# Patient Record
Sex: MALE | Race: WHITE | NOT HISPANIC OR LATINO | Employment: OTHER | URBAN - METROPOLITAN AREA
[De-identification: names, ages, dates, MRNs, and addresses within clinical notes are randomized per-mention and may not be internally consistent; named-entity substitution may affect disease eponyms.]

---

## 2017-01-04 ENCOUNTER — ANESTHESIA (OUTPATIENT)
Dept: GASTROENTEROLOGY | Facility: AMBULARY SURGERY CENTER | Age: 57
End: 2017-01-04
Payer: MEDICARE

## 2017-01-04 ENCOUNTER — HOSPITAL ENCOUNTER (OUTPATIENT)
Facility: AMBULARY SURGERY CENTER | Age: 57
Setting detail: OUTPATIENT SURGERY
Discharge: HOME | End: 2017-01-04
Attending: INTERNAL MEDICINE | Admitting: INTERNAL MEDICINE
Payer: MEDICARE

## 2017-01-04 VITALS
HEART RATE: 61 BPM | HEIGHT: 71 IN | WEIGHT: 260 LBS | SYSTOLIC BLOOD PRESSURE: 132 MMHG | OXYGEN SATURATION: 96 % | BODY MASS INDEX: 36.4 KG/M2 | RESPIRATION RATE: 16 BRPM | TEMPERATURE: 98.3 F | DIASTOLIC BLOOD PRESSURE: 82 MMHG

## 2017-01-04 DIAGNOSIS — R19.4 CHANGE IN BOWEL HABITS: ICD-10-CM

## 2017-01-04 DIAGNOSIS — R10.12 LEFT UPPER QUADRANT PAIN: ICD-10-CM

## 2017-01-04 LAB — GLUCOSE SERPL-MCNC: 219 MG/DL (ref 65–140)

## 2017-01-04 PROCEDURE — 88305 TISSUE EXAM BY PATHOLOGIST: CPT | Performed by: INTERNAL MEDICINE

## 2017-01-04 PROCEDURE — 88312 SPECIAL STAINS GROUP 1: CPT | Performed by: INTERNAL MEDICINE

## 2017-01-04 PROCEDURE — 82948 REAGENT STRIP/BLOOD GLUCOSE: CPT

## 2017-01-04 RX ORDER — SODIUM CHLORIDE, SODIUM LACTATE, POTASSIUM CHLORIDE, CALCIUM CHLORIDE 600; 310; 30; 20 MG/100ML; MG/100ML; MG/100ML; MG/100ML
125 INJECTION, SOLUTION INTRAVENOUS CONTINUOUS
Status: DISCONTINUED | OUTPATIENT
Start: 2017-01-04 | End: 2017-01-04 | Stop reason: HOSPADM

## 2017-01-04 RX ORDER — PROPOFOL 10 MG/ML
INJECTION, EMULSION INTRAVENOUS AS NEEDED
Status: DISCONTINUED | OUTPATIENT
Start: 2017-01-04 | End: 2017-01-04 | Stop reason: SURG

## 2017-01-04 RX ADMIN — PROPOFOL 25 MG: 10 INJECTION, EMULSION INTRAVENOUS at 12:05

## 2017-01-04 RX ADMIN — SODIUM CHLORIDE, SODIUM LACTATE, POTASSIUM CHLORIDE, AND CALCIUM CHLORIDE: .6; .31; .03; .02 INJECTION, SOLUTION INTRAVENOUS at 11:29

## 2017-01-04 RX ADMIN — PROPOFOL 25 MG: 10 INJECTION, EMULSION INTRAVENOUS at 11:58

## 2017-01-04 RX ADMIN — SODIUM CHLORIDE, SODIUM LACTATE, POTASSIUM CHLORIDE, AND CALCIUM CHLORIDE 125 ML/HR: .6; .31; .03; .02 INJECTION, SOLUTION INTRAVENOUS at 11:21

## 2017-01-04 RX ADMIN — PROPOFOL 25 MG: 10 INJECTION, EMULSION INTRAVENOUS at 11:46

## 2017-01-04 RX ADMIN — PROPOFOL 50 MG: 10 INJECTION, EMULSION INTRAVENOUS at 11:32

## 2017-01-04 RX ADMIN — PROPOFOL 25 MG: 10 INJECTION, EMULSION INTRAVENOUS at 11:52

## 2017-01-04 RX ADMIN — PROPOFOL 50 MG: 10 INJECTION, EMULSION INTRAVENOUS at 11:29

## 2017-01-04 RX ADMIN — PROPOFOL 25 MG: 10 INJECTION, EMULSION INTRAVENOUS at 11:37

## 2017-01-04 RX ADMIN — PROPOFOL 25 MG: 10 INJECTION, EMULSION INTRAVENOUS at 12:09

## 2017-01-05 ENCOUNTER — GENERIC CONVERSION - ENCOUNTER (OUTPATIENT)
Dept: OTHER | Facility: OTHER | Age: 57
End: 2017-01-05

## 2017-01-06 ENCOUNTER — GENERIC CONVERSION - ENCOUNTER (OUTPATIENT)
Dept: OTHER | Facility: OTHER | Age: 57
End: 2017-01-06

## 2017-01-06 ENCOUNTER — ALLSCRIPTS OFFICE VISIT (OUTPATIENT)
Dept: OTHER | Facility: OTHER | Age: 57
End: 2017-01-06

## 2017-01-13 ENCOUNTER — GENERIC CONVERSION - ENCOUNTER (OUTPATIENT)
Dept: OTHER | Facility: OTHER | Age: 57
End: 2017-01-13

## 2017-01-17 ENCOUNTER — TRANSCRIBE ORDERS (OUTPATIENT)
Dept: ADMINISTRATIVE | Facility: HOSPITAL | Age: 57
End: 2017-01-17

## 2017-01-17 ENCOUNTER — GENERIC CONVERSION - ENCOUNTER (OUTPATIENT)
Dept: OTHER | Facility: OTHER | Age: 57
End: 2017-01-17

## 2017-01-17 DIAGNOSIS — R10.10 PAIN OF UPPER ABDOMEN: Primary | ICD-10-CM

## 2017-01-23 ENCOUNTER — ALLSCRIPTS OFFICE VISIT (OUTPATIENT)
Dept: OTHER | Facility: OTHER | Age: 57
End: 2017-01-23

## 2017-01-30 ENCOUNTER — TRANSCRIBE ORDERS (OUTPATIENT)
Dept: ADMINISTRATIVE | Facility: HOSPITAL | Age: 57
End: 2017-01-30

## 2017-01-30 ENCOUNTER — GENERIC CONVERSION - ENCOUNTER (OUTPATIENT)
Dept: OTHER | Facility: OTHER | Age: 57
End: 2017-01-30

## 2017-01-30 DIAGNOSIS — K57.30 DIVERTICULOSIS OF LARGE INTESTINE WITHOUT DIVERTICULITIS: Primary | ICD-10-CM

## 2017-02-01 ENCOUNTER — GENERIC CONVERSION - ENCOUNTER (OUTPATIENT)
Dept: OTHER | Facility: OTHER | Age: 57
End: 2017-02-01

## 2017-02-03 ENCOUNTER — HOSPITAL ENCOUNTER (OUTPATIENT)
Dept: RADIOLOGY | Facility: HOSPITAL | Age: 57
Discharge: HOME | End: 2017-02-03
Attending: INTERNAL MEDICINE
Payer: MEDICARE

## 2017-02-03 DIAGNOSIS — K57.30 DIVERTICULOSIS OF LARGE INTESTINE WITHOUT DIVERTICULITIS: ICD-10-CM

## 2017-02-03 PROCEDURE — 74177 CT ABD & PELVIS W/CONTRAST: CPT

## 2017-02-03 RX ADMIN — IOHEXOL 100 ML: 350 INJECTION, SOLUTION INTRAVENOUS at 11:53

## 2017-02-20 ENCOUNTER — HOSPITAL ENCOUNTER (OUTPATIENT)
Dept: RADIOLOGY | Facility: HOSPITAL | Age: 57
Discharge: HOME | End: 2017-02-20
Payer: MEDICARE

## 2017-02-20 DIAGNOSIS — R10.10 PAIN OF UPPER ABDOMEN: ICD-10-CM

## 2017-02-20 PROCEDURE — A9541 TC99M SULFUR COLLOID: HCPCS

## 2017-02-20 PROCEDURE — 78264 GASTRIC EMPTYING IMG STUDY: CPT

## 2017-02-25 ENCOUNTER — GENERIC CONVERSION - ENCOUNTER (OUTPATIENT)
Dept: OTHER | Facility: OTHER | Age: 57
End: 2017-02-25

## 2017-03-13 RX ORDER — OMEPRAZOLE 20 MG/1
20 CAPSULE, DELAYED RELEASE ORAL EVERY EVENING
COMMUNITY
End: 2018-08-29 | Stop reason: SDUPTHER

## 2017-03-15 ENCOUNTER — ANESTHESIA (OUTPATIENT)
Dept: GASTROENTEROLOGY | Facility: AMBULARY SURGERY CENTER | Age: 57
End: 2017-03-15
Payer: MEDICARE

## 2017-03-15 ENCOUNTER — HOSPITAL ENCOUNTER (OUTPATIENT)
Facility: AMBULARY SURGERY CENTER | Age: 57
Setting detail: OUTPATIENT SURGERY
Discharge: HOME | End: 2017-03-15
Attending: INTERNAL MEDICINE | Admitting: INTERNAL MEDICINE
Payer: MEDICARE

## 2017-03-15 VITALS
SYSTOLIC BLOOD PRESSURE: 129 MMHG | RESPIRATION RATE: 18 BRPM | OXYGEN SATURATION: 94 % | DIASTOLIC BLOOD PRESSURE: 68 MMHG | HEART RATE: 66 BPM

## 2017-03-15 DIAGNOSIS — K31.84 GASTROPARESIS: ICD-10-CM

## 2017-03-15 DIAGNOSIS — K21.9 GASTRO-ESOPHAGEAL REFLUX DISEASE WITHOUT ESOPHAGITIS: ICD-10-CM

## 2017-03-15 LAB — GLUCOSE SERPL-MCNC: 222 MG/DL (ref 65–140)

## 2017-03-15 PROCEDURE — 88305 TISSUE EXAM BY PATHOLOGIST: CPT | Performed by: INTERNAL MEDICINE

## 2017-03-15 PROCEDURE — 82948 REAGENT STRIP/BLOOD GLUCOSE: CPT

## 2017-03-15 RX ORDER — PROPOFOL 10 MG/ML
INJECTION, EMULSION INTRAVENOUS AS NEEDED
Status: DISCONTINUED | OUTPATIENT
Start: 2017-03-15 | End: 2017-03-15 | Stop reason: SURG

## 2017-03-15 RX ORDER — SODIUM CHLORIDE, SODIUM LACTATE, POTASSIUM CHLORIDE, CALCIUM CHLORIDE 600; 310; 30; 20 MG/100ML; MG/100ML; MG/100ML; MG/100ML
INJECTION, SOLUTION INTRAVENOUS CONTINUOUS PRN
Status: DISCONTINUED | OUTPATIENT
Start: 2017-03-15 | End: 2017-03-15 | Stop reason: SURG

## 2017-03-15 RX ADMIN — SODIUM CHLORIDE, SODIUM LACTATE, POTASSIUM CHLORIDE, AND CALCIUM CHLORIDE: .6; .31; .03; .02 INJECTION, SOLUTION INTRAVENOUS at 09:01

## 2017-03-15 RX ADMIN — PROPOFOL 200 MG: 10 INJECTION, EMULSION INTRAVENOUS at 09:08

## 2017-03-19 ENCOUNTER — HOSPITAL ENCOUNTER (INPATIENT)
Facility: HOSPITAL | Age: 57
LOS: 6 days | Discharge: HOME HEALTH | DRG: 191 | End: 2017-03-26
Attending: EMERGENCY MEDICINE | Admitting: INTERNAL MEDICINE
Payer: MEDICARE

## 2017-03-19 DIAGNOSIS — J18.9 PNEUMONIA: ICD-10-CM

## 2017-03-19 DIAGNOSIS — B96.89 ACUTE BACTERIAL BRONCHITIS: ICD-10-CM

## 2017-03-19 DIAGNOSIS — F31.9 BIPOLAR AFFECTIVE (HCC): ICD-10-CM

## 2017-03-19 DIAGNOSIS — R07.89 LEFT-SIDED CHEST WALL PAIN: ICD-10-CM

## 2017-03-19 DIAGNOSIS — J20.9 ACUTE BRONCHITIS: ICD-10-CM

## 2017-03-19 DIAGNOSIS — I10 ESSENTIAL HYPERTENSION: Chronic | ICD-10-CM

## 2017-03-19 DIAGNOSIS — E11.9 DIABETES MELLITUS (HCC): Chronic | ICD-10-CM

## 2017-03-19 DIAGNOSIS — E11.65 HYPERGLYCEMIA DUE TO TYPE 2 DIABETES MELLITUS (HCC): ICD-10-CM

## 2017-03-19 DIAGNOSIS — N17.9 ACUTE KIDNEY INJURY (HCC): ICD-10-CM

## 2017-03-19 DIAGNOSIS — R53.83 FATIGUE, UNSPECIFIED TYPE: ICD-10-CM

## 2017-03-19 DIAGNOSIS — J20.8 ACUTE BACTERIAL BRONCHITIS: ICD-10-CM

## 2017-03-19 DIAGNOSIS — J44.9 COPD (CHRONIC OBSTRUCTIVE PULMONARY DISEASE) (HCC): ICD-10-CM

## 2017-03-19 DIAGNOSIS — R10.12 LEFT UPPER QUADRANT PAIN: ICD-10-CM

## 2017-03-19 DIAGNOSIS — G47.33 OSA ON CPAP: ICD-10-CM

## 2017-03-19 DIAGNOSIS — A41.9 SEPSIS (HCC): Primary | ICD-10-CM

## 2017-03-19 DIAGNOSIS — N17.9 AKI (ACUTE KIDNEY INJURY) (HCC): ICD-10-CM

## 2017-03-19 DIAGNOSIS — F99 PSYCHIATRIC DISORDER: ICD-10-CM

## 2017-03-19 DIAGNOSIS — E66.9 OBESITY: ICD-10-CM

## 2017-03-19 DIAGNOSIS — J20.5 ACUTE BRONCHITIS DUE TO RESPIRATORY SYNCYTIAL VIRUS (RSV): ICD-10-CM

## 2017-03-19 DIAGNOSIS — J44.1 COPD EXACERBATION (HCC): ICD-10-CM

## 2017-03-19 DIAGNOSIS — R53.82 CHRONIC FATIGUE SYNDROME: Chronic | ICD-10-CM

## 2017-03-19 DIAGNOSIS — R05.9 COUGH: ICD-10-CM

## 2017-03-19 DIAGNOSIS — Z99.89 OSA ON CPAP: ICD-10-CM

## 2017-03-20 ENCOUNTER — APPOINTMENT (EMERGENCY)
Dept: RADIOLOGY | Facility: HOSPITAL | Age: 57
DRG: 191 | End: 2017-03-20
Payer: MEDICARE

## 2017-03-20 PROBLEM — E11.65 HYPERGLYCEMIA DUE TO TYPE 2 DIABETES MELLITUS (HCC): Status: ACTIVE | Noted: 2017-03-20

## 2017-03-20 PROBLEM — J20.9 ACUTE BRONCHITIS: Status: ACTIVE | Noted: 2017-03-20

## 2017-03-20 PROBLEM — R65.20 SEVERE SEPSIS (HCC): Status: ACTIVE | Noted: 2017-03-20

## 2017-03-20 PROBLEM — A41.9 SEPSIS (HCC): Status: ACTIVE | Noted: 2017-03-20

## 2017-03-20 PROBLEM — N17.9 ACUTE KIDNEY INJURY (HCC): Status: ACTIVE | Noted: 2017-03-20

## 2017-03-20 PROBLEM — R07.89 LEFT-SIDED CHEST WALL PAIN: Status: ACTIVE | Noted: 2017-03-20

## 2017-03-20 PROBLEM — J44.1 COPD EXACERBATION (HCC): Status: ACTIVE | Noted: 2017-03-20

## 2017-03-20 LAB
ANION GAP SERPL CALCULATED.3IONS-SCNC: 11 MMOL/L (ref 4–13)
ANION GAP SERPL CALCULATED.3IONS-SCNC: 12 MMOL/L (ref 4–13)
APTT PPP: 23 SECONDS (ref 24–33)
BACTERIA UR QL AUTO: ABNORMAL /HPF
BASOPHILS # BLD AUTO: 0 THOUSANDS/ΜL (ref 0–0.1)
BASOPHILS NFR BLD AUTO: 0 % (ref 0–1)
BILIRUB UR QL STRIP: NEGATIVE
BUN SERPL-MCNC: 26 MG/DL (ref 5–25)
BUN SERPL-MCNC: 27 MG/DL (ref 5–25)
CALCIUM SERPL-MCNC: 8.9 MG/DL (ref 8.3–10.1)
CALCIUM SERPL-MCNC: 9 MG/DL (ref 8.3–10.1)
CHLORIDE SERPL-SCNC: 100 MMOL/L (ref 100–108)
CHLORIDE SERPL-SCNC: 103 MMOL/L (ref 100–108)
CLARITY UR: CLEAR
CO2 SERPL-SCNC: 24 MMOL/L (ref 21–32)
CO2 SERPL-SCNC: 24 MMOL/L (ref 21–32)
COLOR UR: YELLOW
CREAT SERPL-MCNC: 1.37 MG/DL (ref 0.6–1.3)
CREAT SERPL-MCNC: 1.38 MG/DL (ref 0.6–1.3)
CRP SERPL QL: <3 MG/L
DEPRECATED D DIMER PPP: 320 NG/ML (FEU) (ref 190–520)
EOSINOPHIL # BLD AUTO: 0.1 THOUSAND/ΜL (ref 0–0.61)
EOSINOPHIL NFR BLD AUTO: 1 % (ref 0–6)
ERYTHROCYTE [DISTWIDTH] IN BLOOD BY AUTOMATED COUNT: 13.3 % (ref 11.6–15.1)
EST. AVERAGE GLUCOSE BLD GHB EST-MCNC: 192 MG/DL
GFR SERPL CREATININE-BSD FRML MDRD: 53.1 ML/MIN/1.73SQ M
GFR SERPL CREATININE-BSD FRML MDRD: 53.6 ML/MIN/1.73SQ M
GLUCOSE SERPL-MCNC: 173 MG/DL (ref 65–140)
GLUCOSE SERPL-MCNC: 261 MG/DL (ref 65–140)
GLUCOSE SERPL-MCNC: 336 MG/DL (ref 65–140)
GLUCOSE SERPL-MCNC: 357 MG/DL (ref 65–140)
GLUCOSE SERPL-MCNC: 369 MG/DL (ref 65–140)
GLUCOSE SERPL-MCNC: 432 MG/DL (ref 65–140)
GLUCOSE SERPL-MCNC: 437 MG/DL (ref 65–140)
GLUCOSE UR STRIP-MCNC: ABNORMAL MG/DL
HBA1C MFR BLD: 8.3 % (ref 4.2–6.3)
HCT VFR BLD AUTO: 41.7 % (ref 42–52)
HGB BLD-MCNC: 14 G/DL (ref 14–18)
HGB UR QL STRIP.AUTO: NEGATIVE
INR PPP: 0.95 (ref 0.86–1.16)
KETONES UR STRIP-MCNC: NEGATIVE MG/DL
L PNEUMO1 AG UR QL IA.RAPID: NEGATIVE
LACTATE SERPL-SCNC: 2.7 MMOL/L (ref 0.5–2)
LACTATE SERPL-SCNC: 2.9 MMOL/L (ref 0.5–2)
LACTATE SERPL-SCNC: 3.1 MMOL/L (ref 0.5–2)
LACTATE SERPL-SCNC: 3.4 MMOL/L (ref 0.5–2)
LACTATE SERPL-SCNC: 3.5 MMOL/L (ref 0.5–2)
LACTATE SERPL-SCNC: 3.6 MMOL/L (ref 0.5–2)
LACTATE SERPL-SCNC: 3.9 MMOL/L (ref 0.5–2)
LACTATE SERPL-SCNC: 4.4 MMOL/L (ref 0.5–2)
LEUKOCYTE ESTERASE UR QL STRIP: ABNORMAL
LYMPHOCYTES # BLD AUTO: 2.8 THOUSANDS/ΜL (ref 0.6–4.47)
LYMPHOCYTES NFR BLD AUTO: 32 % (ref 14–44)
MAGNESIUM SERPL-MCNC: 1.7 MG/DL (ref 1.6–2.6)
MAGNESIUM SERPL-MCNC: 1.7 MG/DL (ref 1.6–2.6)
MCH RBC QN AUTO: 31 PG (ref 27–31)
MCHC RBC AUTO-ENTMCNC: 33.6 G/DL (ref 31.4–37.4)
MCV RBC AUTO: 92 FL (ref 82–98)
MONOCYTES # BLD AUTO: 0.7 THOUSAND/ΜL (ref 0.17–1.22)
MONOCYTES NFR BLD AUTO: 8 % (ref 4–12)
NEUTROPHILS # BLD AUTO: 5.1 THOUSANDS/ΜL (ref 1.85–7.62)
NEUTS SEG NFR BLD AUTO: 59 % (ref 43–75)
NITRITE UR QL STRIP: NEGATIVE
NON-SQ EPI CELLS URNS QL MICRO: ABNORMAL /HPF
NRBC BLD AUTO-RTO: 0 /100 WBCS
NT-PROBNP SERPL-MCNC: 56 PG/ML
PH UR STRIP.AUTO: 5 [PH] (ref 5–9)
PLATELET # BLD AUTO: 211 THOUSANDS/UL (ref 130–400)
PMV BLD AUTO: 8 FL (ref 8.9–12.7)
POTASSIUM SERPL-SCNC: 5 MMOL/L (ref 3.5–5.3)
POTASSIUM SERPL-SCNC: 5.1 MMOL/L (ref 3.5–5.3)
PROT UR STRIP-MCNC: NEGATIVE MG/DL
PROTHROMBIN TIME: 10 SECONDS (ref 9.4–11.7)
RBC # BLD AUTO: 4.53 MILLION/UL (ref 4.7–6.1)
RBC #/AREA URNS AUTO: ABNORMAL /HPF
S PNEUM AG UR QL: NEGATIVE
SODIUM SERPL-SCNC: 136 MMOL/L (ref 136–145)
SODIUM SERPL-SCNC: 138 MMOL/L (ref 136–145)
SP GR UR STRIP.AUTO: 1.01 (ref 1–1.03)
TROPONIN I SERPL-MCNC: <0.02 NG/ML
TROPONIN I SERPL-MCNC: <0.02 NG/ML
UROBILINOGEN UR QL STRIP.AUTO: 0.2 E.U./DL
WBC # BLD AUTO: 8.7 THOUSAND/UL (ref 4.8–10.8)
WBC #/AREA URNS AUTO: ABNORMAL /HPF

## 2017-03-20 PROCEDURE — 80048 BASIC METABOLIC PNL TOTAL CA: CPT | Performed by: EMERGENCY MEDICINE

## 2017-03-20 PROCEDURE — 87147 CULTURE TYPE IMMUNOLOGIC: CPT | Performed by: NURSE PRACTITIONER

## 2017-03-20 PROCEDURE — 94664 DEMO&/EVAL PT USE INHALER: CPT

## 2017-03-20 PROCEDURE — 85730 THROMBOPLASTIN TIME PARTIAL: CPT | Performed by: NURSE PRACTITIONER

## 2017-03-20 PROCEDURE — 94640 AIRWAY INHALATION TREATMENT: CPT

## 2017-03-20 PROCEDURE — 83605 ASSAY OF LACTIC ACID: CPT | Performed by: NURSE PRACTITIONER

## 2017-03-20 PROCEDURE — 87798 DETECT AGENT NOS DNA AMP: CPT | Performed by: NURSE PRACTITIONER

## 2017-03-20 PROCEDURE — 87205 SMEAR GRAM STAIN: CPT | Performed by: NURSE PRACTITIONER

## 2017-03-20 PROCEDURE — 83605 ASSAY OF LACTIC ACID: CPT | Performed by: INTERNAL MEDICINE

## 2017-03-20 PROCEDURE — 96361 HYDRATE IV INFUSION ADD-ON: CPT

## 2017-03-20 PROCEDURE — 86140 C-REACTIVE PROTEIN: CPT | Performed by: NURSE PRACTITIONER

## 2017-03-20 PROCEDURE — 94760 N-INVAS EAR/PLS OXIMETRY 1: CPT

## 2017-03-20 PROCEDURE — 87081 CULTURE SCREEN ONLY: CPT | Performed by: NURSE PRACTITIONER

## 2017-03-20 PROCEDURE — 83735 ASSAY OF MAGNESIUM: CPT | Performed by: NURSE PRACTITIONER

## 2017-03-20 PROCEDURE — 84484 ASSAY OF TROPONIN QUANT: CPT | Performed by: NURSE PRACTITIONER

## 2017-03-20 PROCEDURE — 94660 CPAP INITIATION&MGMT: CPT

## 2017-03-20 PROCEDURE — 87186 SC STD MICRODIL/AGAR DIL: CPT | Performed by: NURSE PRACTITIONER

## 2017-03-20 PROCEDURE — 99285 EMERGENCY DEPT VISIT HI MDM: CPT

## 2017-03-20 PROCEDURE — 85379 FIBRIN DEGRADATION QUANT: CPT | Performed by: EMERGENCY MEDICINE

## 2017-03-20 PROCEDURE — 84484 ASSAY OF TROPONIN QUANT: CPT | Performed by: EMERGENCY MEDICINE

## 2017-03-20 PROCEDURE — 83735 ASSAY OF MAGNESIUM: CPT | Performed by: EMERGENCY MEDICINE

## 2017-03-20 PROCEDURE — 83036 HEMOGLOBIN GLYCOSYLATED A1C: CPT | Performed by: NURSE PRACTITIONER

## 2017-03-20 PROCEDURE — 87070 CULTURE OTHR SPECIMN AEROBIC: CPT | Performed by: NURSE PRACTITIONER

## 2017-03-20 PROCEDURE — 87449 NOS EACH ORGANISM AG IA: CPT | Performed by: NURSE PRACTITIONER

## 2017-03-20 PROCEDURE — 85610 PROTHROMBIN TIME: CPT | Performed by: NURSE PRACTITIONER

## 2017-03-20 PROCEDURE — 81001 URINALYSIS AUTO W/SCOPE: CPT | Performed by: EMERGENCY MEDICINE

## 2017-03-20 PROCEDURE — 36415 COLL VENOUS BLD VENIPUNCTURE: CPT | Performed by: EMERGENCY MEDICINE

## 2017-03-20 PROCEDURE — 71020 HB CHEST X-RAY 2VW FRONTAL&LATL: CPT

## 2017-03-20 PROCEDURE — 93005 ELECTROCARDIOGRAM TRACING: CPT | Performed by: EMERGENCY MEDICINE

## 2017-03-20 PROCEDURE — 85025 COMPLETE CBC W/AUTO DIFF WBC: CPT | Performed by: EMERGENCY MEDICINE

## 2017-03-20 PROCEDURE — 87040 BLOOD CULTURE FOR BACTERIA: CPT | Performed by: EMERGENCY MEDICINE

## 2017-03-20 PROCEDURE — 96375 TX/PRO/DX INJ NEW DRUG ADDON: CPT

## 2017-03-20 PROCEDURE — 83880 ASSAY OF NATRIURETIC PEPTIDE: CPT | Performed by: EMERGENCY MEDICINE

## 2017-03-20 PROCEDURE — 83605 ASSAY OF LACTIC ACID: CPT | Performed by: EMERGENCY MEDICINE

## 2017-03-20 PROCEDURE — 96374 THER/PROPH/DIAG INJ IV PUSH: CPT

## 2017-03-20 PROCEDURE — 87086 URINE CULTURE/COLONY COUNT: CPT | Performed by: EMERGENCY MEDICINE

## 2017-03-20 PROCEDURE — 94644 CONT INHLJ TX 1ST HOUR: CPT

## 2017-03-20 PROCEDURE — 80048 BASIC METABOLIC PNL TOTAL CA: CPT | Performed by: NURSE PRACTITIONER

## 2017-03-20 PROCEDURE — 82948 REAGENT STRIP/BLOOD GLUCOSE: CPT

## 2017-03-20 RX ORDER — LAMOTRIGINE 100 MG/1
100 TABLET ORAL
Status: DISCONTINUED | OUTPATIENT
Start: 2017-03-20 | End: 2017-03-26 | Stop reason: HOSPADM

## 2017-03-20 RX ORDER — MORPHINE SULFATE 4 MG/ML
4 INJECTION, SOLUTION INTRAMUSCULAR; INTRAVENOUS ONCE
Status: COMPLETED | OUTPATIENT
Start: 2017-03-20 | End: 2017-03-20

## 2017-03-20 RX ORDER — PREGABALIN 50 MG/1
50 CAPSULE ORAL 3 TIMES DAILY
Status: DISCONTINUED | OUTPATIENT
Start: 2017-03-20 | End: 2017-03-26 | Stop reason: HOSPADM

## 2017-03-20 RX ORDER — ALBUTEROL SULFATE 2.5 MG/3ML
10 SOLUTION RESPIRATORY (INHALATION) ONCE
Status: COMPLETED | OUTPATIENT
Start: 2017-03-20 | End: 2017-03-20

## 2017-03-20 RX ORDER — IPRATROPIUM BROMIDE AND ALBUTEROL SULFATE 2.5; .5 MG/3ML; MG/3ML
3 SOLUTION RESPIRATORY (INHALATION) ONCE
Status: COMPLETED | OUTPATIENT
Start: 2017-03-20 | End: 2017-03-20

## 2017-03-20 RX ORDER — OXYCODONE HYDROCHLORIDE AND ACETAMINOPHEN 5; 325 MG/1; MG/1
1 TABLET ORAL EVERY 6 HOURS PRN
Status: DISCONTINUED | OUTPATIENT
Start: 2017-03-20 | End: 2017-03-26 | Stop reason: HOSPADM

## 2017-03-20 RX ORDER — FENOFIBRATE 48 MG/1
160 TABLET, COATED ORAL DAILY
Status: DISCONTINUED | OUTPATIENT
Start: 2017-03-20 | End: 2017-03-20 | Stop reason: CLARIF

## 2017-03-20 RX ORDER — SODIUM CHLORIDE FOR INHALATION 0.9 %
3 VIAL, NEBULIZER (ML) INHALATION EVERY 4 HOURS PRN
Status: DISCONTINUED | OUTPATIENT
Start: 2017-03-20 | End: 2017-03-26 | Stop reason: HOSPADM

## 2017-03-20 RX ORDER — SODIUM CHLORIDE 9 MG/ML
100 INJECTION, SOLUTION INTRAVENOUS CONTINUOUS
Status: DISCONTINUED | OUTPATIENT
Start: 2017-03-20 | End: 2017-03-22

## 2017-03-20 RX ORDER — SODIUM CHLORIDE 9 MG/ML
100 INJECTION, SOLUTION INTRAVENOUS CONTINUOUS
Status: DISCONTINUED | OUTPATIENT
Start: 2017-03-20 | End: 2017-03-20

## 2017-03-20 RX ORDER — ZOLPIDEM TARTRATE 5 MG/1
10 TABLET ORAL
Status: DISCONTINUED | OUTPATIENT
Start: 2017-03-20 | End: 2017-03-26 | Stop reason: HOSPADM

## 2017-03-20 RX ORDER — AMLODIPINE BESYLATE 5 MG/1
5 TABLET ORAL 2 TIMES DAILY
Status: DISCONTINUED | OUTPATIENT
Start: 2017-03-20 | End: 2017-03-26 | Stop reason: HOSPADM

## 2017-03-20 RX ORDER — HYDROXYZINE PAMOATE 50 MG/1
50 CAPSULE ORAL
Status: DISCONTINUED | OUTPATIENT
Start: 2017-03-20 | End: 2017-03-20

## 2017-03-20 RX ORDER — LISINOPRIL 20 MG/1
20 TABLET ORAL DAILY
Status: DISCONTINUED | OUTPATIENT
Start: 2017-03-20 | End: 2017-03-20

## 2017-03-20 RX ORDER — IPRATROPIUM BROMIDE AND ALBUTEROL SULFATE 2.5; .5 MG/3ML; MG/3ML
3 SOLUTION RESPIRATORY (INHALATION)
Status: DISCONTINUED | OUTPATIENT
Start: 2017-03-20 | End: 2017-03-26 | Stop reason: HOSPADM

## 2017-03-20 RX ORDER — BENZONATATE 100 MG/1
100 CAPSULE ORAL 3 TIMES DAILY
Status: DISCONTINUED | OUTPATIENT
Start: 2017-03-20 | End: 2017-03-26 | Stop reason: HOSPADM

## 2017-03-20 RX ORDER — FENOFIBRATE 145 MG/1
145 TABLET, COATED ORAL DAILY
Status: DISCONTINUED | OUTPATIENT
Start: 2017-03-20 | End: 2017-03-26 | Stop reason: HOSPADM

## 2017-03-20 RX ORDER — METHYLPREDNISOLONE SODIUM SUCCINATE 125 MG/2ML
60 INJECTION, POWDER, LYOPHILIZED, FOR SOLUTION INTRAMUSCULAR; INTRAVENOUS ONCE
Status: COMPLETED | OUTPATIENT
Start: 2017-03-20 | End: 2017-03-20

## 2017-03-20 RX ORDER — PANTOPRAZOLE SODIUM 40 MG/1
40 TABLET, DELAYED RELEASE ORAL
Status: DISCONTINUED | OUTPATIENT
Start: 2017-03-20 | End: 2017-03-26 | Stop reason: HOSPADM

## 2017-03-20 RX ORDER — ACETAMINOPHEN 325 MG/1
650 TABLET ORAL EVERY 6 HOURS PRN
Status: DISCONTINUED | OUTPATIENT
Start: 2017-03-20 | End: 2017-03-26 | Stop reason: HOSPADM

## 2017-03-20 RX ORDER — LEVALBUTEROL 1.25 MG/.5ML
1.25 SOLUTION, CONCENTRATE RESPIRATORY (INHALATION) EVERY 4 HOURS PRN
Status: DISCONTINUED | OUTPATIENT
Start: 2017-03-20 | End: 2017-03-26 | Stop reason: HOSPADM

## 2017-03-20 RX ORDER — HYDROCODONE POLISTIREX AND CHLORPHENIRAMINE POLISTIREX 10; 8 MG/5ML; MG/5ML
5 SUSPENSION, EXTENDED RELEASE ORAL EVERY 12 HOURS PRN
Status: DISCONTINUED | OUTPATIENT
Start: 2017-03-20 | End: 2017-03-26 | Stop reason: HOSPADM

## 2017-03-20 RX ORDER — ASPIRIN 81 MG/1
81 TABLET, CHEWABLE ORAL DAILY
Status: DISCONTINUED | OUTPATIENT
Start: 2017-03-20 | End: 2017-03-26 | Stop reason: HOSPADM

## 2017-03-20 RX ORDER — SODIUM CHLORIDE 9 MG/ML
125 INJECTION, SOLUTION INTRAVENOUS CONTINUOUS
Status: DISCONTINUED | OUTPATIENT
Start: 2017-03-20 | End: 2017-03-20

## 2017-03-20 RX ORDER — VENLAFAXINE HYDROCHLORIDE 150 MG/1
150 CAPSULE, EXTENDED RELEASE ORAL
Status: DISCONTINUED | OUTPATIENT
Start: 2017-03-20 | End: 2017-03-26 | Stop reason: HOSPADM

## 2017-03-20 RX ORDER — VENLAFAXINE 37.5 MG/1
150 TABLET ORAL EVERY MORNING
Status: DISCONTINUED | OUTPATIENT
Start: 2017-03-20 | End: 2017-03-20 | Stop reason: ALTCHOICE

## 2017-03-20 RX ORDER — LEVALBUTEROL 1.25 MG/.5ML
1.25 SOLUTION, CONCENTRATE RESPIRATORY (INHALATION)
Status: DISCONTINUED | OUTPATIENT
Start: 2017-03-20 | End: 2017-03-20

## 2017-03-20 RX ORDER — SODIUM CHLORIDE FOR INHALATION 0.9 %
3 VIAL, NEBULIZER (ML) INHALATION
Status: DISCONTINUED | OUTPATIENT
Start: 2017-03-20 | End: 2017-03-20

## 2017-03-20 RX ORDER — INSULIN GLARGINE 100 [IU]/ML
30 INJECTION, SOLUTION SUBCUTANEOUS
Status: DISCONTINUED | OUTPATIENT
Start: 2017-03-20 | End: 2017-03-26 | Stop reason: HOSPADM

## 2017-03-20 RX ORDER — ZOLPIDEM TARTRATE 5 MG/1
10 TABLET ORAL
Status: DISCONTINUED | OUTPATIENT
Start: 2017-03-20 | End: 2017-03-20

## 2017-03-20 RX ORDER — METHYLPREDNISOLONE SODIUM SUCCINATE 40 MG/ML
40 INJECTION, POWDER, LYOPHILIZED, FOR SOLUTION INTRAMUSCULAR; INTRAVENOUS EVERY 8 HOURS
Status: DISCONTINUED | OUTPATIENT
Start: 2017-03-20 | End: 2017-03-22

## 2017-03-20 RX ORDER — METHYLPREDNISOLONE SODIUM SUCCINATE 125 MG/2ML
125 INJECTION, POWDER, LYOPHILIZED, FOR SOLUTION INTRAMUSCULAR; INTRAVENOUS ONCE
Status: DISCONTINUED | OUTPATIENT
Start: 2017-03-20 | End: 2017-03-20

## 2017-03-20 RX ORDER — HEPARIN SODIUM 5000 [USP'U]/ML
5000 INJECTION, SOLUTION INTRAVENOUS; SUBCUTANEOUS EVERY 8 HOURS SCHEDULED
Status: DISCONTINUED | OUTPATIENT
Start: 2017-03-20 | End: 2017-03-26 | Stop reason: HOSPADM

## 2017-03-20 RX ORDER — PRAVASTATIN SODIUM 40 MG
40 TABLET ORAL
Status: DISCONTINUED | OUTPATIENT
Start: 2017-03-20 | End: 2017-03-26 | Stop reason: HOSPADM

## 2017-03-20 RX ORDER — MAGNESIUM HYDROXIDE/ALUMINUM HYDROXICE/SIMETHICONE 120; 1200; 1200 MG/30ML; MG/30ML; MG/30ML
30 SUSPENSION ORAL EVERY 6 HOURS PRN
Status: DISCONTINUED | OUTPATIENT
Start: 2017-03-20 | End: 2017-03-26 | Stop reason: HOSPADM

## 2017-03-20 RX ADMIN — SODIUM CHLORIDE 100 ML/HR: 0.9 INJECTION, SOLUTION INTRAVENOUS at 03:38

## 2017-03-20 RX ADMIN — METHYLPREDNISOLONE SODIUM SUCCINATE 60 MG: 125 INJECTION, POWDER, FOR SOLUTION INTRAMUSCULAR; INTRAVENOUS at 00:11

## 2017-03-20 RX ADMIN — LEVALBUTEROL 1.25 MG: 1.25 SOLUTION, CONCENTRATE RESPIRATORY (INHALATION) at 06:32

## 2017-03-20 RX ADMIN — INSULIN LISPRO 1 UNITS: 100 INJECTION, SOLUTION INTRAVENOUS; SUBCUTANEOUS at 21:08

## 2017-03-20 RX ADMIN — OXYCODONE HYDROCHLORIDE AND ACETAMINOPHEN 1 TABLET: 5; 325 TABLET ORAL at 16:46

## 2017-03-20 RX ADMIN — IPRATROPIUM BROMIDE AND ALBUTEROL SULFATE 3 ML: .5; 3 SOLUTION RESPIRATORY (INHALATION) at 00:09

## 2017-03-20 RX ADMIN — IPRATROPIUM BROMIDE 0.5 MG: 0.5 SOLUTION RESPIRATORY (INHALATION) at 16:09

## 2017-03-20 RX ADMIN — HEPARIN SODIUM 5000 UNITS: 5000 INJECTION, SOLUTION INTRAVENOUS; SUBCUTANEOUS at 05:16

## 2017-03-20 RX ADMIN — CEFTRIAXONE 1000 MG: 1 INJECTION, SOLUTION INTRAVENOUS at 00:44

## 2017-03-20 RX ADMIN — INSULIN LISPRO 16 UNITS: 100 INJECTION, SOLUTION INTRAVENOUS; SUBCUTANEOUS at 17:35

## 2017-03-20 RX ADMIN — LEVALBUTEROL 1.25 MG: 1.25 SOLUTION, CONCENTRATE RESPIRATORY (INHALATION) at 16:09

## 2017-03-20 RX ADMIN — IPRATROPIUM BROMIDE 0.5 MG: 0.5 SOLUTION RESPIRATORY (INHALATION) at 11:48

## 2017-03-20 RX ADMIN — VANCOMYCIN HYDROCHLORIDE 1750 MG: 1 INJECTION, POWDER, LYOPHILIZED, FOR SOLUTION INTRAVENOUS at 01:00

## 2017-03-20 RX ADMIN — ZOLPIDEM TARTRATE 10 MG: 5 TABLET, COATED ORAL at 03:29

## 2017-03-20 RX ADMIN — SODIUM CHLORIDE 100 ML/HR: 0.9 INJECTION, SOLUTION INTRAVENOUS at 17:38

## 2017-03-20 RX ADMIN — QUETIAPINE 800 MG: 300 TABLET, EXTENDED RELEASE ORAL at 21:02

## 2017-03-20 RX ADMIN — INSULIN LISPRO 6 UNITS: 100 INJECTION, SOLUTION INTRAVENOUS; SUBCUTANEOUS at 09:35

## 2017-03-20 RX ADMIN — IPRATROPIUM BROMIDE AND ALBUTEROL SULFATE 3 ML: .5; 3 SOLUTION RESPIRATORY (INHALATION) at 20:35

## 2017-03-20 RX ADMIN — PREGABALIN 50 MG: 50 CAPSULE ORAL at 21:02

## 2017-03-20 RX ADMIN — VANCOMYCIN HYDROCHLORIDE 1750 MG: 1 INJECTION, POWDER, LYOPHILIZED, FOR SOLUTION INTRAVENOUS at 01:40

## 2017-03-20 RX ADMIN — LISINOPRIL 20 MG: 20 TABLET ORAL at 08:33

## 2017-03-20 RX ADMIN — METOPROLOL TARTRATE 25 MG: 25 TABLET ORAL at 17:33

## 2017-03-20 RX ADMIN — VANCOMYCIN HYDROCHLORIDE 1250 MG: 1 INJECTION, POWDER, LYOPHILIZED, FOR SOLUTION INTRAVENOUS at 14:56

## 2017-03-20 RX ADMIN — OXYCODONE HYDROCHLORIDE AND ACETAMINOPHEN 1 TABLET: 5; 325 TABLET ORAL at 09:38

## 2017-03-20 RX ADMIN — INSULIN GLARGINE 30 UNITS: 100 INJECTION, SOLUTION SUBCUTANEOUS at 21:04

## 2017-03-20 RX ADMIN — INSULIN LISPRO 15 UNITS: 100 INJECTION, SOLUTION INTRAVENOUS; SUBCUTANEOUS at 06:56

## 2017-03-20 RX ADMIN — ZOLPIDEM TARTRATE 10 MG: 5 TABLET, COATED ORAL at 21:02

## 2017-03-20 RX ADMIN — PREGABALIN 50 MG: 50 CAPSULE ORAL at 16:46

## 2017-03-20 RX ADMIN — AMLODIPINE BESYLATE 5 MG: 5 TABLET ORAL at 17:33

## 2017-03-20 RX ADMIN — LAMOTRIGINE 100 MG: 100 TABLET ORAL at 03:28

## 2017-03-20 RX ADMIN — LAMOTRIGINE 100 MG: 100 TABLET ORAL at 21:02

## 2017-03-20 RX ADMIN — LEVALBUTEROL 1.25 MG: 1.25 SOLUTION, CONCENTRATE RESPIRATORY (INHALATION) at 11:48

## 2017-03-20 RX ADMIN — PREGABALIN 50 MG: 50 CAPSULE ORAL at 08:33

## 2017-03-20 RX ADMIN — OXYCODONE HYDROCHLORIDE AND ACETAMINOPHEN 1 TABLET: 5; 325 TABLET ORAL at 03:29

## 2017-03-20 RX ADMIN — MENTHOL 5.4 MG: 5.4 LOZENGE ORAL at 01:28

## 2017-03-20 RX ADMIN — INSULIN LISPRO 14 UNITS: 100 INJECTION, SOLUTION INTRAVENOUS; SUBCUTANEOUS at 13:50

## 2017-03-20 RX ADMIN — METHYLPREDNISOLONE SODIUM SUCCINATE 40 MG: 40 INJECTION, POWDER, FOR SOLUTION INTRAMUSCULAR; INTRAVENOUS at 08:34

## 2017-03-20 RX ADMIN — FENOFIBRATE 145 MG: 145 TABLET, FILM COATED ORAL at 08:33

## 2017-03-20 RX ADMIN — FLUTICASONE PROPIONATE AND SALMETEROL 1 PUFF: 50; 250 POWDER RESPIRATORY (INHALATION) at 21:21

## 2017-03-20 RX ADMIN — SODIUM CHLORIDE 1000 ML: 0.9 INJECTION, SOLUTION INTRAVENOUS at 06:15

## 2017-03-20 RX ADMIN — HYDROCODONE POLISTIREX AND CHLORPHENIRAMINE POLISTIREX 5 ML: 10; 8 SUSPENSION, EXTENDED RELEASE ORAL at 00:18

## 2017-03-20 RX ADMIN — SODIUM CHLORIDE 1000 ML: 0.9 INJECTION, SOLUTION INTRAVENOUS at 09:33

## 2017-03-20 RX ADMIN — MORPHINE SULFATE 4 MG: 4 INJECTION, SOLUTION INTRAMUSCULAR; INTRAVENOUS at 00:53

## 2017-03-20 RX ADMIN — METHYLPREDNISOLONE SODIUM SUCCINATE 40 MG: 40 INJECTION, POWDER, FOR SOLUTION INTRAMUSCULAR; INTRAVENOUS at 16:47

## 2017-03-20 RX ADMIN — AMLODIPINE BESYLATE 5 MG: 5 TABLET ORAL at 08:34

## 2017-03-20 RX ADMIN — INSULIN LISPRO 3 UNITS: 100 INJECTION, SOLUTION INTRAVENOUS; SUBCUTANEOUS at 11:24

## 2017-03-20 RX ADMIN — ALBUTEROL SULFATE 10 MG: 2.5 SOLUTION RESPIRATORY (INHALATION) at 00:43

## 2017-03-20 RX ADMIN — INSULIN LISPRO 5 UNITS: 100 INJECTION, SOLUTION INTRAVENOUS; SUBCUTANEOUS at 17:34

## 2017-03-20 RX ADMIN — BENZONATATE 100 MG: 100 CAPSULE ORAL at 21:02

## 2017-03-20 RX ADMIN — CEFEPIME HYDROCHLORIDE 2000 MG: 2 INJECTION, POWDER, FOR SOLUTION INTRAVENOUS at 15:25

## 2017-03-20 RX ADMIN — PANTOPRAZOLE SODIUM 40 MG: 40 TABLET, DELAYED RELEASE ORAL at 05:16

## 2017-03-20 RX ADMIN — CEFEPIME HYDROCHLORIDE 2000 MG: 2 INJECTION, POWDER, FOR SOLUTION INTRAVENOUS at 00:48

## 2017-03-20 RX ADMIN — SODIUM CHLORIDE 1000 ML: 0.9 INJECTION, SOLUTION INTRAVENOUS at 00:11

## 2017-03-20 RX ADMIN — ASPIRIN 81 MG CHEWABLE TABLET 81 MG: 81 TABLET CHEWABLE at 08:33

## 2017-03-20 RX ADMIN — HEPARIN SODIUM 5000 UNITS: 5000 INJECTION, SOLUTION INTRAVENOUS; SUBCUTANEOUS at 15:21

## 2017-03-20 RX ADMIN — INSULIN LISPRO 14 UNITS: 100 INJECTION, SOLUTION INTRAVENOUS; SUBCUTANEOUS at 09:41

## 2017-03-20 RX ADMIN — HEPARIN SODIUM 5000 UNITS: 5000 INJECTION, SOLUTION INTRAVENOUS; SUBCUTANEOUS at 21:01

## 2017-03-20 RX ADMIN — BENZONATATE 100 MG: 100 CAPSULE ORAL at 16:46

## 2017-03-20 RX ADMIN — ISODIUM CHLORIDE 3 ML: 0.03 SOLUTION RESPIRATORY (INHALATION) at 06:32

## 2017-03-20 RX ADMIN — PRAVASTATIN SODIUM 40 MG: 40 TABLET ORAL at 16:46

## 2017-03-20 RX ADMIN — METOPROLOL TARTRATE 25 MG: 25 TABLET ORAL at 08:33

## 2017-03-21 PROBLEM — B33.8 RSV INFECTION: Status: ACTIVE | Noted: 2017-03-21

## 2017-03-21 LAB
ANION GAP SERPL CALCULATED.3IONS-SCNC: 12 MMOL/L (ref 4–13)
ATRIAL RATE: 110 BPM
BACTERIA UR CULT: NORMAL
BASOPHILS # BLD AUTO: 0 THOUSANDS/ΜL (ref 0–0.1)
BASOPHILS NFR BLD AUTO: 0 % (ref 0–1)
BUN SERPL-MCNC: 23 MG/DL (ref 5–25)
CALCIUM SERPL-MCNC: 8.7 MG/DL (ref 8.3–10.1)
CHLORIDE SERPL-SCNC: 101 MMOL/L (ref 100–108)
CO2 SERPL-SCNC: 24 MMOL/L (ref 21–32)
CREAT SERPL-MCNC: 1.16 MG/DL (ref 0.6–1.3)
EOSINOPHIL # BLD AUTO: 0 THOUSAND/ΜL (ref 0–0.61)
EOSINOPHIL NFR BLD AUTO: 0 % (ref 0–6)
ERYTHROCYTE [DISTWIDTH] IN BLOOD BY AUTOMATED COUNT: 13.2 % (ref 11.6–15.1)
FLUAV AG SPEC QL: ABNORMAL
FLUBV AG SPEC QL: ABNORMAL
GFR SERPL CREATININE-BSD FRML MDRD: >60 ML/MIN/1.73SQ M
GLUCOSE SERPL-MCNC: 195 MG/DL (ref 65–140)
GLUCOSE SERPL-MCNC: 280 MG/DL (ref 65–140)
GLUCOSE SERPL-MCNC: 296 MG/DL (ref 65–140)
GLUCOSE SERPL-MCNC: 297 MG/DL (ref 65–140)
GLUCOSE SERPL-MCNC: 392 MG/DL (ref 65–140)
HCT VFR BLD AUTO: 38.2 % (ref 42–52)
HGB BLD-MCNC: 12.6 G/DL (ref 14–18)
LACTATE SERPL-SCNC: 2.6 MMOL/L (ref 0.5–2)
LYMPHOCYTES # BLD AUTO: 2.2 THOUSANDS/ΜL (ref 0.6–4.47)
LYMPHOCYTES NFR BLD AUTO: 19 % (ref 14–44)
MCH RBC QN AUTO: 30.3 PG (ref 27–31)
MCHC RBC AUTO-ENTMCNC: 33.1 G/DL (ref 31.4–37.4)
MCV RBC AUTO: 92 FL (ref 82–98)
MONOCYTES # BLD AUTO: 0.4 THOUSAND/ΜL (ref 0.17–1.22)
MONOCYTES NFR BLD AUTO: 3 % (ref 4–12)
MRSA NOSE QL CULT: NORMAL
NEUTROPHILS # BLD AUTO: 9.1 THOUSANDS/ΜL (ref 1.85–7.62)
NEUTS SEG NFR BLD AUTO: 77 % (ref 43–75)
NRBC BLD AUTO-RTO: 0 /100 WBCS
P AXIS: 39 DEGREES
PLATELET # BLD AUTO: 208 THOUSANDS/UL (ref 130–400)
PLATELET BLD QL SMEAR: ADEQUATE
PMV BLD AUTO: 7.9 FL (ref 8.9–12.7)
POTASSIUM SERPL-SCNC: 4.3 MMOL/L (ref 3.5–5.3)
PR INTERVAL: 132 MS
QRS AXIS: 64 DEGREES
QRSD INTERVAL: 84 MS
QT INTERVAL: 316 MS
QTC INTERVAL: 427 MS
RBC # BLD AUTO: 4.18 MILLION/UL (ref 4.7–6.1)
RSV B RNA SPEC QL NAA+PROBE: DETECTED
SODIUM SERPL-SCNC: 137 MMOL/L (ref 136–145)
T WAVE AXIS: 40 DEGREES
VENTRICULAR RATE: 110 BPM
WBC # BLD AUTO: 11.8 THOUSAND/UL (ref 4.8–10.8)

## 2017-03-21 PROCEDURE — 82948 REAGENT STRIP/BLOOD GLUCOSE: CPT

## 2017-03-21 PROCEDURE — 80048 BASIC METABOLIC PNL TOTAL CA: CPT | Performed by: INTERNAL MEDICINE

## 2017-03-21 PROCEDURE — 85025 COMPLETE CBC W/AUTO DIFF WBC: CPT | Performed by: INTERNAL MEDICINE

## 2017-03-21 PROCEDURE — 83605 ASSAY OF LACTIC ACID: CPT | Performed by: NURSE PRACTITIONER

## 2017-03-21 PROCEDURE — 94640 AIRWAY INHALATION TREATMENT: CPT

## 2017-03-21 PROCEDURE — 94760 N-INVAS EAR/PLS OXIMETRY 1: CPT

## 2017-03-21 RX ORDER — INSULIN GLARGINE 100 [IU]/ML
10 INJECTION, SOLUTION SUBCUTANEOUS ONCE
Status: COMPLETED | OUTPATIENT
Start: 2017-03-21 | End: 2017-03-21

## 2017-03-21 RX ADMIN — ASPIRIN 81 MG CHEWABLE TABLET 81 MG: 81 TABLET CHEWABLE at 08:55

## 2017-03-21 RX ADMIN — INSULIN GLARGINE 10 UNITS: 100 INJECTION, SOLUTION SUBCUTANEOUS at 11:52

## 2017-03-21 RX ADMIN — QUETIAPINE 800 MG: 300 TABLET, EXTENDED RELEASE ORAL at 21:14

## 2017-03-21 RX ADMIN — HEPARIN SODIUM 5000 UNITS: 5000 INJECTION, SOLUTION INTRAVENOUS; SUBCUTANEOUS at 14:45

## 2017-03-21 RX ADMIN — SODIUM CHLORIDE 100 ML/HR: 0.9 INJECTION, SOLUTION INTRAVENOUS at 12:36

## 2017-03-21 RX ADMIN — PRAVASTATIN SODIUM 40 MG: 40 TABLET ORAL at 17:10

## 2017-03-21 RX ADMIN — PREGABALIN 50 MG: 50 CAPSULE ORAL at 21:13

## 2017-03-21 RX ADMIN — INSULIN LISPRO 14 UNITS: 100 INJECTION, SOLUTION INTRAVENOUS; SUBCUTANEOUS at 08:58

## 2017-03-21 RX ADMIN — INSULIN GLARGINE 30 UNITS: 100 INJECTION, SOLUTION SUBCUTANEOUS at 22:14

## 2017-03-21 RX ADMIN — VENLAFAXINE HYDROCHLORIDE 150 MG: 150 CAPSULE, EXTENDED RELEASE ORAL at 08:55

## 2017-03-21 RX ADMIN — AMLODIPINE BESYLATE 5 MG: 5 TABLET ORAL at 08:56

## 2017-03-21 RX ADMIN — PREGABALIN 50 MG: 50 CAPSULE ORAL at 08:56

## 2017-03-21 RX ADMIN — INSULIN LISPRO 4 UNITS: 100 INJECTION, SOLUTION INTRAVENOUS; SUBCUTANEOUS at 08:57

## 2017-03-21 RX ADMIN — OXYCODONE HYDROCHLORIDE AND ACETAMINOPHEN 1 TABLET: 5; 325 TABLET ORAL at 12:36

## 2017-03-21 RX ADMIN — INSULIN LISPRO 6 UNITS: 100 INJECTION, SOLUTION INTRAVENOUS; SUBCUTANEOUS at 11:53

## 2017-03-21 RX ADMIN — METHYLPREDNISOLONE SODIUM SUCCINATE 40 MG: 40 INJECTION, POWDER, FOR SOLUTION INTRAMUSCULAR; INTRAVENOUS at 23:48

## 2017-03-21 RX ADMIN — IPRATROPIUM BROMIDE AND ALBUTEROL SULFATE 3 ML: .5; 3 SOLUTION RESPIRATORY (INHALATION) at 08:07

## 2017-03-21 RX ADMIN — METHYLPREDNISOLONE SODIUM SUCCINATE 40 MG: 40 INJECTION, POWDER, FOR SOLUTION INTRAMUSCULAR; INTRAVENOUS at 00:11

## 2017-03-21 RX ADMIN — HEPARIN SODIUM 5000 UNITS: 5000 INJECTION, SOLUTION INTRAVENOUS; SUBCUTANEOUS at 21:14

## 2017-03-21 RX ADMIN — INSULIN LISPRO 4 UNITS: 100 INJECTION, SOLUTION INTRAVENOUS; SUBCUTANEOUS at 17:11

## 2017-03-21 RX ADMIN — METOPROLOL TARTRATE 25 MG: 25 TABLET ORAL at 08:56

## 2017-03-21 RX ADMIN — LAMOTRIGINE 100 MG: 100 TABLET ORAL at 21:14

## 2017-03-21 RX ADMIN — BENZONATATE 100 MG: 100 CAPSULE ORAL at 17:10

## 2017-03-21 RX ADMIN — CEFEPIME HYDROCHLORIDE 2000 MG: 2 INJECTION, POWDER, FOR SOLUTION INTRAVENOUS at 12:36

## 2017-03-21 RX ADMIN — FLUTICASONE PROPIONATE AND SALMETEROL 1 PUFF: 50; 250 POWDER RESPIRATORY (INHALATION) at 21:13

## 2017-03-21 RX ADMIN — METHYLPREDNISOLONE SODIUM SUCCINATE 40 MG: 40 INJECTION, POWDER, FOR SOLUTION INTRAMUSCULAR; INTRAVENOUS at 08:57

## 2017-03-21 RX ADMIN — INSULIN LISPRO 14 UNITS: 100 INJECTION, SOLUTION INTRAVENOUS; SUBCUTANEOUS at 11:52

## 2017-03-21 RX ADMIN — HEPARIN SODIUM 5000 UNITS: 5000 INJECTION, SOLUTION INTRAVENOUS; SUBCUTANEOUS at 05:42

## 2017-03-21 RX ADMIN — PREGABALIN 50 MG: 50 CAPSULE ORAL at 17:10

## 2017-03-21 RX ADMIN — INSULIN LISPRO 2 UNITS: 100 INJECTION, SOLUTION INTRAVENOUS; SUBCUTANEOUS at 22:17

## 2017-03-21 RX ADMIN — INSULIN LISPRO 16 UNITS: 100 INJECTION, SOLUTION INTRAVENOUS; SUBCUTANEOUS at 17:11

## 2017-03-21 RX ADMIN — METOPROLOL TARTRATE 25 MG: 25 TABLET ORAL at 17:10

## 2017-03-21 RX ADMIN — PANTOPRAZOLE SODIUM 40 MG: 40 TABLET, DELAYED RELEASE ORAL at 05:42

## 2017-03-21 RX ADMIN — CEFEPIME HYDROCHLORIDE 2000 MG: 2 INJECTION, POWDER, FOR SOLUTION INTRAVENOUS at 00:11

## 2017-03-21 RX ADMIN — BENZONATATE 100 MG: 100 CAPSULE ORAL at 08:56

## 2017-03-21 RX ADMIN — FLUTICASONE PROPIONATE AND SALMETEROL 1 PUFF: 50; 250 POWDER RESPIRATORY (INHALATION) at 08:56

## 2017-03-21 RX ADMIN — ZOLPIDEM TARTRATE 10 MG: 5 TABLET, COATED ORAL at 21:13

## 2017-03-21 RX ADMIN — BENZONATATE 100 MG: 100 CAPSULE ORAL at 21:14

## 2017-03-21 RX ADMIN — FENOFIBRATE 145 MG: 145 TABLET, FILM COATED ORAL at 08:56

## 2017-03-21 RX ADMIN — AMLODIPINE BESYLATE 5 MG: 5 TABLET ORAL at 17:10

## 2017-03-21 RX ADMIN — METHYLPREDNISOLONE SODIUM SUCCINATE 40 MG: 40 INJECTION, POWDER, FOR SOLUTION INTRAMUSCULAR; INTRAVENOUS at 17:10

## 2017-03-21 RX ADMIN — IPRATROPIUM BROMIDE AND ALBUTEROL SULFATE 3 ML: .5; 3 SOLUTION RESPIRATORY (INHALATION) at 13:42

## 2017-03-21 RX ADMIN — IPRATROPIUM BROMIDE AND ALBUTEROL SULFATE 3 ML: .5; 3 SOLUTION RESPIRATORY (INHALATION) at 20:30

## 2017-03-22 ENCOUNTER — APPOINTMENT (INPATIENT)
Dept: OCCUPATIONAL THERAPY | Facility: HOSPITAL | Age: 57
DRG: 191 | End: 2017-03-22
Payer: MEDICARE

## 2017-03-22 ENCOUNTER — APPOINTMENT (INPATIENT)
Dept: PHYSICAL THERAPY | Facility: HOSPITAL | Age: 57
DRG: 191 | End: 2017-03-22
Payer: MEDICARE

## 2017-03-22 PROBLEM — B96.89 ACUTE BACTERIAL BRONCHITIS: Status: ACTIVE | Noted: 2017-03-22

## 2017-03-22 PROBLEM — J20.5 ACUTE BRONCHITIS DUE TO RESPIRATORY SYNCYTIAL VIRUS (RSV): Status: ACTIVE | Noted: 2017-03-21

## 2017-03-22 PROBLEM — J20.8 ACUTE BACTERIAL BRONCHITIS: Status: ACTIVE | Noted: 2017-03-22

## 2017-03-22 LAB
ANION GAP SERPL CALCULATED.3IONS-SCNC: 9 MMOL/L (ref 4–13)
BACTERIA SPT RESP CULT: NORMAL
BACTERIA SPT RESP CULT: NORMAL
BASE EXCESS BLDA CALC-SCNC: -1.2 MMOL/L
BASOPHILS # BLD AUTO: 0.2 THOUSANDS/ΜL (ref 0–0.1)
BASOPHILS NFR BLD AUTO: 2 % (ref 0–1)
BODY TEMPERATURE: 96.4 DEGREES FEHRENHEIT
BUN SERPL-MCNC: 21 MG/DL (ref 5–25)
CALCIUM SERPL-MCNC: 8.7 MG/DL (ref 8.3–10.1)
CHLORIDE SERPL-SCNC: 100 MMOL/L (ref 100–108)
CO2 SERPL-SCNC: 27 MMOL/L (ref 21–32)
CREAT SERPL-MCNC: 0.99 MG/DL (ref 0.6–1.3)
EOSINOPHIL # BLD AUTO: 0 THOUSAND/ΜL (ref 0–0.61)
EOSINOPHIL NFR BLD AUTO: 0 % (ref 0–6)
ERYTHROCYTE [DISTWIDTH] IN BLOOD BY AUTOMATED COUNT: 12.6 % (ref 11.6–15.1)
GFR SERPL CREATININE-BSD FRML MDRD: >60 ML/MIN/1.73SQ M
GLUCOSE SERPL-MCNC: 232 MG/DL (ref 65–140)
GLUCOSE SERPL-MCNC: 283 MG/DL (ref 65–140)
GLUCOSE SERPL-MCNC: 301 MG/DL (ref 65–140)
GLUCOSE SERPL-MCNC: 341 MG/DL (ref 65–140)
GRAM STN SPEC: NORMAL
HCO3 BLDA-SCNC: 22.7 MMOL/L (ref 22–28)
HCT VFR BLD AUTO: 38.4 % (ref 42–52)
HGB BLD-MCNC: 12.5 G/DL (ref 14–18)
LYMPHOCYTES # BLD AUTO: 1.8 THOUSANDS/ΜL (ref 0.6–4.47)
LYMPHOCYTES NFR BLD AUTO: 20 % (ref 14–44)
MCH RBC QN AUTO: 29.8 PG (ref 27–31)
MCHC RBC AUTO-ENTMCNC: 32.6 G/DL (ref 31.4–37.4)
MCV RBC AUTO: 91 FL (ref 82–98)
MONOCYTES # BLD AUTO: 0.3 THOUSAND/ΜL (ref 0.17–1.22)
MONOCYTES NFR BLD AUTO: 3 % (ref 4–12)
NEUTROPHILS # BLD AUTO: 6.6 THOUSANDS/ΜL (ref 1.85–7.62)
NEUTS SEG NFR BLD AUTO: 75 % (ref 43–75)
NON VENT ROOM AIR: 21 %
O2 CT BLDA-SCNC: 17 ML/DL (ref 16–23)
OXYHGB MFR BLDA: 93.5 % (ref 94–97)
PCO2 BLDA: 35.7 MM HG (ref 36–44)
PCO2 TEMP ADJ BLDA: 33.9 MM HG (ref 36–44)
PH BLD: 7.44 [PH] (ref 7.35–7.45)
PH BLDA: 7.42 [PH] (ref 7.35–7.45)
PLATELET # BLD AUTO: 186 THOUSANDS/UL (ref 130–400)
PMV BLD AUTO: 8.3 FL (ref 8.9–12.7)
PO2 BLD: 71.8 MM HG (ref 75–129)
PO2 BLDA: 77.7 MM HG (ref 75–129)
POTASSIUM SERPL-SCNC: 4.5 MMOL/L (ref 3.5–5.3)
RBC # BLD AUTO: 4.2 MILLION/UL (ref 4.7–6.1)
SODIUM SERPL-SCNC: 136 MMOL/L (ref 136–145)
SPECIMEN SOURCE: ABNORMAL
WBC # BLD AUTO: 8.9 THOUSAND/UL (ref 4.8–10.8)

## 2017-03-22 PROCEDURE — 82948 REAGENT STRIP/BLOOD GLUCOSE: CPT

## 2017-03-22 PROCEDURE — 80048 BASIC METABOLIC PNL TOTAL CA: CPT | Performed by: INTERNAL MEDICINE

## 2017-03-22 PROCEDURE — 94150 VITAL CAPACITY TEST: CPT

## 2017-03-22 PROCEDURE — 82805 BLOOD GASES W/O2 SATURATION: CPT | Performed by: INTERNAL MEDICINE

## 2017-03-22 PROCEDURE — 97166 OT EVAL MOD COMPLEX 45 MIN: CPT

## 2017-03-22 PROCEDURE — G8978 MOBILITY CURRENT STATUS: HCPCS

## 2017-03-22 PROCEDURE — 97163 PT EVAL HIGH COMPLEX 45 MIN: CPT

## 2017-03-22 PROCEDURE — G8988 SELF CARE GOAL STATUS: HCPCS

## 2017-03-22 PROCEDURE — 94760 N-INVAS EAR/PLS OXIMETRY 1: CPT

## 2017-03-22 PROCEDURE — 94640 AIRWAY INHALATION TREATMENT: CPT

## 2017-03-22 PROCEDURE — 85025 COMPLETE CBC W/AUTO DIFF WBC: CPT | Performed by: INTERNAL MEDICINE

## 2017-03-22 PROCEDURE — G8987 SELF CARE CURRENT STATUS: HCPCS

## 2017-03-22 PROCEDURE — G8979 MOBILITY GOAL STATUS: HCPCS

## 2017-03-22 PROCEDURE — 36600 WITHDRAWAL OF ARTERIAL BLOOD: CPT

## 2017-03-22 RX ORDER — INSULIN GLARGINE 100 [IU]/ML
5 INJECTION, SOLUTION SUBCUTANEOUS ONCE
Status: COMPLETED | OUTPATIENT
Start: 2017-03-22 | End: 2017-03-22

## 2017-03-22 RX ORDER — METHYLPREDNISOLONE SODIUM SUCCINATE 40 MG/ML
20 INJECTION, POWDER, LYOPHILIZED, FOR SOLUTION INTRAMUSCULAR; INTRAVENOUS EVERY 8 HOURS
Status: DISCONTINUED | OUTPATIENT
Start: 2017-03-22 | End: 2017-03-24

## 2017-03-22 RX ADMIN — HEPARIN SODIUM 5000 UNITS: 5000 INJECTION, SOLUTION INTRAVENOUS; SUBCUTANEOUS at 13:12

## 2017-03-22 RX ADMIN — INSULIN LISPRO 3 UNITS: 100 INJECTION, SOLUTION INTRAVENOUS; SUBCUTANEOUS at 17:09

## 2017-03-22 RX ADMIN — BENZONATATE 100 MG: 100 CAPSULE ORAL at 09:07

## 2017-03-22 RX ADMIN — VENLAFAXINE HYDROCHLORIDE 150 MG: 150 CAPSULE, EXTENDED RELEASE ORAL at 09:06

## 2017-03-22 RX ADMIN — INSULIN LISPRO 4 UNITS: 100 INJECTION, SOLUTION INTRAVENOUS; SUBCUTANEOUS at 21:09

## 2017-03-22 RX ADMIN — ASPIRIN 81 MG CHEWABLE TABLET 81 MG: 81 TABLET CHEWABLE at 09:07

## 2017-03-22 RX ADMIN — PREGABALIN 50 MG: 50 CAPSULE ORAL at 09:07

## 2017-03-22 RX ADMIN — PANTOPRAZOLE SODIUM 40 MG: 40 TABLET, DELAYED RELEASE ORAL at 06:01

## 2017-03-22 RX ADMIN — INSULIN LISPRO 16 UNITS: 100 INJECTION, SOLUTION INTRAVENOUS; SUBCUTANEOUS at 17:09

## 2017-03-22 RX ADMIN — LAMOTRIGINE 100 MG: 100 TABLET ORAL at 21:08

## 2017-03-22 RX ADMIN — HEPARIN SODIUM 5000 UNITS: 5000 INJECTION, SOLUTION INTRAVENOUS; SUBCUTANEOUS at 06:01

## 2017-03-22 RX ADMIN — INSULIN LISPRO 14 UNITS: 100 INJECTION, SOLUTION INTRAVENOUS; SUBCUTANEOUS at 11:55

## 2017-03-22 RX ADMIN — SODIUM CHLORIDE 100 ML/HR: 0.9 INJECTION, SOLUTION INTRAVENOUS at 11:05

## 2017-03-22 RX ADMIN — FLUTICASONE PROPIONATE AND SALMETEROL 1 PUFF: 50; 250 POWDER RESPIRATORY (INHALATION) at 21:11

## 2017-03-22 RX ADMIN — AMLODIPINE BESYLATE 5 MG: 5 TABLET ORAL at 09:07

## 2017-03-22 RX ADMIN — IPRATROPIUM BROMIDE AND ALBUTEROL SULFATE 3 ML: .5; 3 SOLUTION RESPIRATORY (INHALATION) at 13:22

## 2017-03-22 RX ADMIN — AMLODIPINE BESYLATE 5 MG: 5 TABLET ORAL at 17:08

## 2017-03-22 RX ADMIN — METHYLPREDNISOLONE SODIUM SUCCINATE 20 MG: 40 INJECTION, POWDER, FOR SOLUTION INTRAMUSCULAR; INTRAVENOUS at 17:09

## 2017-03-22 RX ADMIN — CEFEPIME HYDROCHLORIDE 2000 MG: 2 INJECTION, POWDER, FOR SOLUTION INTRAVENOUS at 00:06

## 2017-03-22 RX ADMIN — INSULIN LISPRO 5 UNITS: 100 INJECTION, SOLUTION INTRAVENOUS; SUBCUTANEOUS at 11:54

## 2017-03-22 RX ADMIN — INSULIN LISPRO 4 UNITS: 100 INJECTION, SOLUTION INTRAVENOUS; SUBCUTANEOUS at 09:08

## 2017-03-22 RX ADMIN — QUETIAPINE 800 MG: 300 TABLET, EXTENDED RELEASE ORAL at 21:10

## 2017-03-22 RX ADMIN — METOPROLOL TARTRATE 25 MG: 25 TABLET ORAL at 17:08

## 2017-03-22 RX ADMIN — FLUTICASONE PROPIONATE AND SALMETEROL 1 PUFF: 50; 250 POWDER RESPIRATORY (INHALATION) at 09:09

## 2017-03-22 RX ADMIN — PREGABALIN 50 MG: 50 CAPSULE ORAL at 17:08

## 2017-03-22 RX ADMIN — INSULIN GLARGINE 5 UNITS: 100 INJECTION, SOLUTION SUBCUTANEOUS at 11:54

## 2017-03-22 RX ADMIN — BENZONATATE 100 MG: 100 CAPSULE ORAL at 21:08

## 2017-03-22 RX ADMIN — INSULIN LISPRO 14 UNITS: 100 INJECTION, SOLUTION INTRAVENOUS; SUBCUTANEOUS at 09:07

## 2017-03-22 RX ADMIN — FENOFIBRATE 145 MG: 145 TABLET, FILM COATED ORAL at 09:06

## 2017-03-22 RX ADMIN — METHYLPREDNISOLONE SODIUM SUCCINATE 40 MG: 40 INJECTION, POWDER, FOR SOLUTION INTRAMUSCULAR; INTRAVENOUS at 09:07

## 2017-03-22 RX ADMIN — PRAVASTATIN SODIUM 40 MG: 40 TABLET ORAL at 17:08

## 2017-03-22 RX ADMIN — INSULIN GLARGINE 30 UNITS: 100 INJECTION, SOLUTION SUBCUTANEOUS at 21:09

## 2017-03-22 RX ADMIN — BENZONATATE 100 MG: 100 CAPSULE ORAL at 17:08

## 2017-03-22 RX ADMIN — ZOLPIDEM TARTRATE 10 MG: 5 TABLET, COATED ORAL at 21:08

## 2017-03-22 RX ADMIN — IPRATROPIUM BROMIDE AND ALBUTEROL SULFATE 3 ML: .5; 3 SOLUTION RESPIRATORY (INHALATION) at 07:26

## 2017-03-22 RX ADMIN — HEPARIN SODIUM 5000 UNITS: 5000 INJECTION, SOLUTION INTRAVENOUS; SUBCUTANEOUS at 21:08

## 2017-03-22 RX ADMIN — CEFEPIME HYDROCHLORIDE 2000 MG: 2 INJECTION, POWDER, FOR SOLUTION INTRAVENOUS at 13:12

## 2017-03-22 RX ADMIN — METOPROLOL TARTRATE 25 MG: 25 TABLET ORAL at 09:07

## 2017-03-22 RX ADMIN — PREGABALIN 50 MG: 50 CAPSULE ORAL at 21:08

## 2017-03-22 RX ADMIN — IPRATROPIUM BROMIDE AND ALBUTEROL SULFATE 3 ML: .5; 3 SOLUTION RESPIRATORY (INHALATION) at 20:29

## 2017-03-23 ENCOUNTER — APPOINTMENT (INPATIENT)
Dept: OCCUPATIONAL THERAPY | Facility: HOSPITAL | Age: 57
DRG: 191 | End: 2017-03-23
Payer: MEDICARE

## 2017-03-23 ENCOUNTER — APPOINTMENT (INPATIENT)
Dept: PHYSICAL THERAPY | Facility: HOSPITAL | Age: 57
DRG: 191 | End: 2017-03-23
Payer: MEDICARE

## 2017-03-23 LAB
ANION GAP SERPL CALCULATED.3IONS-SCNC: 9 MMOL/L (ref 4–13)
BASOPHILS # BLD AUTO: 0 THOUSANDS/ΜL (ref 0–0.1)
BASOPHILS NFR BLD AUTO: 0 % (ref 0–1)
BUN SERPL-MCNC: 21 MG/DL (ref 5–25)
CALCIUM SERPL-MCNC: 8.8 MG/DL (ref 8.3–10.1)
CHLORIDE SERPL-SCNC: 98 MMOL/L (ref 100–108)
CO2 SERPL-SCNC: 27 MMOL/L (ref 21–32)
CREAT SERPL-MCNC: 1.05 MG/DL (ref 0.6–1.3)
EOSINOPHIL # BLD AUTO: 0 THOUSAND/ΜL (ref 0–0.61)
EOSINOPHIL NFR BLD AUTO: 0 % (ref 0–6)
ERYTHROCYTE [DISTWIDTH] IN BLOOD BY AUTOMATED COUNT: 13 % (ref 11.6–15.1)
GFR SERPL CREATININE-BSD FRML MDRD: >60 ML/MIN/1.73SQ M
GLUCOSE SERPL-MCNC: 185 MG/DL (ref 65–140)
GLUCOSE SERPL-MCNC: 218 MG/DL (ref 65–140)
GLUCOSE SERPL-MCNC: 222 MG/DL (ref 65–140)
GLUCOSE SERPL-MCNC: 261 MG/DL (ref 65–140)
GLUCOSE SERPL-MCNC: 281 MG/DL (ref 65–140)
GLUCOSE SERPL-MCNC: 284 MG/DL (ref 65–140)
HCT VFR BLD AUTO: 38.7 % (ref 42–52)
HGB BLD-MCNC: 12.9 G/DL (ref 14–18)
LYMPHOCYTES # BLD AUTO: 2.6 THOUSANDS/ΜL (ref 0.6–4.47)
LYMPHOCYTES NFR BLD AUTO: 24 % (ref 14–44)
MCH RBC QN AUTO: 30.5 PG (ref 27–31)
MCHC RBC AUTO-ENTMCNC: 33.4 G/DL (ref 31.4–37.4)
MCV RBC AUTO: 91 FL (ref 82–98)
MONOCYTES # BLD AUTO: 0.6 THOUSAND/ΜL (ref 0.17–1.22)
MONOCYTES NFR BLD AUTO: 5 % (ref 4–12)
NEUTROPHILS # BLD AUTO: 7.8 THOUSANDS/ΜL (ref 1.85–7.62)
NEUTS SEG NFR BLD AUTO: 71 % (ref 43–75)
NRBC BLD AUTO-RTO: 0 /100 WBCS
PLATELET # BLD AUTO: 209 THOUSANDS/UL (ref 130–400)
PMV BLD AUTO: 8 FL (ref 8.9–12.7)
POTASSIUM SERPL-SCNC: 4.7 MMOL/L (ref 3.5–5.3)
RBC # BLD AUTO: 4.24 MILLION/UL (ref 4.7–6.1)
SODIUM SERPL-SCNC: 134 MMOL/L (ref 136–145)
WBC # BLD AUTO: 10.9 THOUSAND/UL (ref 4.8–10.8)

## 2017-03-23 PROCEDURE — 82948 REAGENT STRIP/BLOOD GLUCOSE: CPT

## 2017-03-23 PROCEDURE — 94640 AIRWAY INHALATION TREATMENT: CPT

## 2017-03-23 PROCEDURE — 97110 THERAPEUTIC EXERCISES: CPT

## 2017-03-23 PROCEDURE — 85025 COMPLETE CBC W/AUTO DIFF WBC: CPT | Performed by: INTERNAL MEDICINE

## 2017-03-23 PROCEDURE — 94150 VITAL CAPACITY TEST: CPT

## 2017-03-23 PROCEDURE — 80048 BASIC METABOLIC PNL TOTAL CA: CPT | Performed by: INTERNAL MEDICINE

## 2017-03-23 PROCEDURE — 94760 N-INVAS EAR/PLS OXIMETRY 1: CPT

## 2017-03-23 RX ADMIN — BENZONATATE 100 MG: 100 CAPSULE ORAL at 16:26

## 2017-03-23 RX ADMIN — METOPROLOL TARTRATE 25 MG: 25 TABLET ORAL at 17:22

## 2017-03-23 RX ADMIN — ISODIUM CHLORIDE 3 ML: 0.03 SOLUTION RESPIRATORY (INHALATION) at 11:07

## 2017-03-23 RX ADMIN — HEPARIN SODIUM 5000 UNITS: 5000 INJECTION, SOLUTION INTRAVENOUS; SUBCUTANEOUS at 13:46

## 2017-03-23 RX ADMIN — IPRATROPIUM BROMIDE AND ALBUTEROL SULFATE 3 ML: .5; 3 SOLUTION RESPIRATORY (INHALATION) at 07:29

## 2017-03-23 RX ADMIN — OXYCODONE HYDROCHLORIDE AND ACETAMINOPHEN 1 TABLET: 5; 325 TABLET ORAL at 12:43

## 2017-03-23 RX ADMIN — IPRATROPIUM BROMIDE AND ALBUTEROL SULFATE 3 ML: .5; 3 SOLUTION RESPIRATORY (INHALATION) at 19:56

## 2017-03-23 RX ADMIN — VENLAFAXINE HYDROCHLORIDE 150 MG: 150 CAPSULE, EXTENDED RELEASE ORAL at 07:49

## 2017-03-23 RX ADMIN — INSULIN GLARGINE 30 UNITS: 100 INJECTION, SOLUTION SUBCUTANEOUS at 21:43

## 2017-03-23 RX ADMIN — FLUTICASONE PROPIONATE AND SALMETEROL 1 PUFF: 50; 250 POWDER RESPIRATORY (INHALATION) at 21:40

## 2017-03-23 RX ADMIN — HEPARIN SODIUM 5000 UNITS: 5000 INJECTION, SOLUTION INTRAVENOUS; SUBCUTANEOUS at 21:35

## 2017-03-23 RX ADMIN — IPRATROPIUM BROMIDE AND ALBUTEROL SULFATE 3 ML: .5; 3 SOLUTION RESPIRATORY (INHALATION) at 13:07

## 2017-03-23 RX ADMIN — INSULIN LISPRO 2 UNITS: 100 INJECTION, SOLUTION INTRAVENOUS; SUBCUTANEOUS at 21:44

## 2017-03-23 RX ADMIN — PREGABALIN 50 MG: 50 CAPSULE ORAL at 08:53

## 2017-03-23 RX ADMIN — METHYLPREDNISOLONE SODIUM SUCCINATE 20 MG: 40 INJECTION, POWDER, FOR SOLUTION INTRAMUSCULAR; INTRAVENOUS at 16:25

## 2017-03-23 RX ADMIN — AMLODIPINE BESYLATE 5 MG: 5 TABLET ORAL at 08:54

## 2017-03-23 RX ADMIN — METHYLPREDNISOLONE SODIUM SUCCINATE 20 MG: 40 INJECTION, POWDER, FOR SOLUTION INTRAMUSCULAR; INTRAVENOUS at 00:34

## 2017-03-23 RX ADMIN — INSULIN LISPRO 14 UNITS: 100 INJECTION, SOLUTION INTRAVENOUS; SUBCUTANEOUS at 12:41

## 2017-03-23 RX ADMIN — INSULIN LISPRO 16 UNITS: 100 INJECTION, SOLUTION INTRAVENOUS; SUBCUTANEOUS at 16:23

## 2017-03-23 RX ADMIN — HEPARIN SODIUM 5000 UNITS: 5000 INJECTION, SOLUTION INTRAVENOUS; SUBCUTANEOUS at 06:32

## 2017-03-23 RX ADMIN — METOPROLOL TARTRATE 25 MG: 25 TABLET ORAL at 08:53

## 2017-03-23 RX ADMIN — BENZONATATE 100 MG: 100 CAPSULE ORAL at 08:53

## 2017-03-23 RX ADMIN — INSULIN LISPRO 14 UNITS: 100 INJECTION, SOLUTION INTRAVENOUS; SUBCUTANEOUS at 07:46

## 2017-03-23 RX ADMIN — AMLODIPINE BESYLATE 5 MG: 5 TABLET ORAL at 17:22

## 2017-03-23 RX ADMIN — PREGABALIN 50 MG: 50 CAPSULE ORAL at 16:26

## 2017-03-23 RX ADMIN — QUETIAPINE 800 MG: 300 TABLET, EXTENDED RELEASE ORAL at 21:34

## 2017-03-23 RX ADMIN — METHYLPREDNISOLONE SODIUM SUCCINATE 20 MG: 40 INJECTION, POWDER, FOR SOLUTION INTRAMUSCULAR; INTRAVENOUS at 07:50

## 2017-03-23 RX ADMIN — BENZONATATE 100 MG: 100 CAPSULE ORAL at 21:34

## 2017-03-23 RX ADMIN — PANTOPRAZOLE SODIUM 40 MG: 40 TABLET, DELAYED RELEASE ORAL at 06:32

## 2017-03-23 RX ADMIN — FLUTICASONE PROPIONATE AND SALMETEROL 1 PUFF: 50; 250 POWDER RESPIRATORY (INHALATION) at 08:56

## 2017-03-23 RX ADMIN — LAMOTRIGINE 100 MG: 100 TABLET ORAL at 21:34

## 2017-03-23 RX ADMIN — INSULIN LISPRO 2 UNITS: 100 INJECTION, SOLUTION INTRAVENOUS; SUBCUTANEOUS at 07:45

## 2017-03-23 RX ADMIN — LEVALBUTEROL 1.25 MG: 1.25 SOLUTION, CONCENTRATE RESPIRATORY (INHALATION) at 11:07

## 2017-03-23 RX ADMIN — FENOFIBRATE 145 MG: 145 TABLET, FILM COATED ORAL at 08:53

## 2017-03-23 RX ADMIN — CEFEPIME HYDROCHLORIDE 2000 MG: 2 INJECTION, POWDER, FOR SOLUTION INTRAVENOUS at 00:33

## 2017-03-23 RX ADMIN — ASPIRIN 81 MG CHEWABLE TABLET 81 MG: 81 TABLET CHEWABLE at 08:53

## 2017-03-23 RX ADMIN — PREGABALIN 50 MG: 50 CAPSULE ORAL at 21:34

## 2017-03-23 RX ADMIN — ZOLPIDEM TARTRATE 10 MG: 5 TABLET, COATED ORAL at 21:35

## 2017-03-23 RX ADMIN — INSULIN LISPRO 1 UNITS: 100 INJECTION, SOLUTION INTRAVENOUS; SUBCUTANEOUS at 16:22

## 2017-03-23 RX ADMIN — PRAVASTATIN SODIUM 40 MG: 40 TABLET ORAL at 16:26

## 2017-03-23 RX ADMIN — CEFEPIME HYDROCHLORIDE 2000 MG: 2 INJECTION, POWDER, FOR SOLUTION INTRAVENOUS at 13:44

## 2017-03-23 RX ADMIN — INSULIN LISPRO 4 UNITS: 100 INJECTION, SOLUTION INTRAVENOUS; SUBCUTANEOUS at 12:41

## 2017-03-24 ENCOUNTER — APPOINTMENT (INPATIENT)
Dept: OCCUPATIONAL THERAPY | Facility: HOSPITAL | Age: 57
DRG: 191 | End: 2017-03-24
Payer: MEDICARE

## 2017-03-24 LAB
GLUCOSE SERPL-MCNC: 153 MG/DL (ref 65–140)
GLUCOSE SERPL-MCNC: 291 MG/DL (ref 65–140)
GLUCOSE SERPL-MCNC: 294 MG/DL (ref 65–140)
GLUCOSE SERPL-MCNC: 353 MG/DL (ref 65–140)

## 2017-03-24 PROCEDURE — 94640 AIRWAY INHALATION TREATMENT: CPT

## 2017-03-24 PROCEDURE — 82948 REAGENT STRIP/BLOOD GLUCOSE: CPT

## 2017-03-24 PROCEDURE — 94150 VITAL CAPACITY TEST: CPT

## 2017-03-24 PROCEDURE — 94760 N-INVAS EAR/PLS OXIMETRY 1: CPT

## 2017-03-24 PROCEDURE — 94620 HB PULMONARY STRESS TEST/SIMPLE: CPT

## 2017-03-24 PROCEDURE — 97535 SELF CARE MNGMENT TRAINING: CPT

## 2017-03-24 PROCEDURE — 97110 THERAPEUTIC EXERCISES: CPT

## 2017-03-24 PROCEDURE — 94762 N-INVAS EAR/PLS OXIMTRY CONT: CPT

## 2017-03-24 RX ORDER — INSULIN GLARGINE 100 [IU]/ML
15 INJECTION, SOLUTION SUBCUTANEOUS
Status: COMPLETED | OUTPATIENT
Start: 2017-03-24 | End: 2017-03-24

## 2017-03-24 RX ORDER — METHYLPREDNISOLONE SODIUM SUCCINATE 40 MG/ML
20 INJECTION, POWDER, LYOPHILIZED, FOR SOLUTION INTRAMUSCULAR; INTRAVENOUS EVERY 12 HOURS SCHEDULED
Status: DISCONTINUED | OUTPATIENT
Start: 2017-03-24 | End: 2017-03-26

## 2017-03-24 RX ADMIN — OXYCODONE HYDROCHLORIDE AND ACETAMINOPHEN 1 TABLET: 5; 325 TABLET ORAL at 09:42

## 2017-03-24 RX ADMIN — ASPIRIN 81 MG CHEWABLE TABLET 81 MG: 81 TABLET CHEWABLE at 09:30

## 2017-03-24 RX ADMIN — HYDROCODONE POLISTIREX AND CHLORPHENIRAMINE POLISTIREX 5 ML: 10; 8 SUSPENSION, EXTENDED RELEASE ORAL at 21:39

## 2017-03-24 RX ADMIN — ZOLPIDEM TARTRATE 10 MG: 5 TABLET, COATED ORAL at 21:37

## 2017-03-24 RX ADMIN — FLUTICASONE PROPIONATE AND SALMETEROL 1 PUFF: 50; 250 POWDER RESPIRATORY (INHALATION) at 09:34

## 2017-03-24 RX ADMIN — HEPARIN SODIUM 5000 UNITS: 5000 INJECTION, SOLUTION INTRAVENOUS; SUBCUTANEOUS at 05:00

## 2017-03-24 RX ADMIN — IPRATROPIUM BROMIDE AND ALBUTEROL SULFATE 3 ML: .5; 3 SOLUTION RESPIRATORY (INHALATION) at 07:32

## 2017-03-24 RX ADMIN — IPRATROPIUM BROMIDE AND ALBUTEROL SULFATE 3 ML: .5; 3 SOLUTION RESPIRATORY (INHALATION) at 14:03

## 2017-03-24 RX ADMIN — INSULIN LISPRO 14 UNITS: 100 INJECTION, SOLUTION INTRAVENOUS; SUBCUTANEOUS at 09:36

## 2017-03-24 RX ADMIN — METHYLPREDNISOLONE SODIUM SUCCINATE 20 MG: 40 INJECTION, POWDER, FOR SOLUTION INTRAMUSCULAR; INTRAVENOUS at 00:24

## 2017-03-24 RX ADMIN — INSULIN GLARGINE 15 UNITS: 100 INJECTION, SOLUTION SUBCUTANEOUS at 23:21

## 2017-03-24 RX ADMIN — INSULIN LISPRO 4 UNITS: 100 INJECTION, SOLUTION INTRAVENOUS; SUBCUTANEOUS at 13:03

## 2017-03-24 RX ADMIN — PRAVASTATIN SODIUM 40 MG: 40 TABLET ORAL at 16:39

## 2017-03-24 RX ADMIN — INSULIN LISPRO 6 UNITS: 100 INJECTION, SOLUTION INTRAVENOUS; SUBCUTANEOUS at 09:36

## 2017-03-24 RX ADMIN — HEPARIN SODIUM 5000 UNITS: 5000 INJECTION, SOLUTION INTRAVENOUS; SUBCUTANEOUS at 13:50

## 2017-03-24 RX ADMIN — HEPARIN SODIUM 5000 UNITS: 5000 INJECTION, SOLUTION INTRAVENOUS; SUBCUTANEOUS at 21:37

## 2017-03-24 RX ADMIN — INSULIN LISPRO 14 UNITS: 100 INJECTION, SOLUTION INTRAVENOUS; SUBCUTANEOUS at 13:01

## 2017-03-24 RX ADMIN — PANTOPRAZOLE SODIUM 40 MG: 40 TABLET, DELAYED RELEASE ORAL at 05:00

## 2017-03-24 RX ADMIN — AMLODIPINE BESYLATE 5 MG: 5 TABLET ORAL at 09:29

## 2017-03-24 RX ADMIN — VENLAFAXINE HYDROCHLORIDE 150 MG: 150 CAPSULE, EXTENDED RELEASE ORAL at 09:37

## 2017-03-24 RX ADMIN — METHYLPREDNISOLONE SODIUM SUCCINATE 20 MG: 40 INJECTION, POWDER, FOR SOLUTION INTRAMUSCULAR; INTRAVENOUS at 21:40

## 2017-03-24 RX ADMIN — LAMOTRIGINE 100 MG: 100 TABLET ORAL at 21:37

## 2017-03-24 RX ADMIN — AMLODIPINE BESYLATE 5 MG: 5 TABLET ORAL at 18:15

## 2017-03-24 RX ADMIN — BENZONATATE 100 MG: 100 CAPSULE ORAL at 21:36

## 2017-03-24 RX ADMIN — METOPROLOL TARTRATE 25 MG: 25 TABLET ORAL at 18:15

## 2017-03-24 RX ADMIN — INSULIN LISPRO 1 UNITS: 100 INJECTION, SOLUTION INTRAVENOUS; SUBCUTANEOUS at 16:42

## 2017-03-24 RX ADMIN — QUETIAPINE 800 MG: 300 TABLET, EXTENDED RELEASE ORAL at 21:36

## 2017-03-24 RX ADMIN — BENZONATATE 100 MG: 100 CAPSULE ORAL at 16:39

## 2017-03-24 RX ADMIN — PREGABALIN 50 MG: 50 CAPSULE ORAL at 09:30

## 2017-03-24 RX ADMIN — BENZONATATE 100 MG: 100 CAPSULE ORAL at 09:29

## 2017-03-24 RX ADMIN — FLUTICASONE PROPIONATE AND SALMETEROL 1 PUFF: 50; 250 POWDER RESPIRATORY (INHALATION) at 21:37

## 2017-03-24 RX ADMIN — OXYCODONE HYDROCHLORIDE AND ACETAMINOPHEN 1 TABLET: 5; 325 TABLET ORAL at 23:25

## 2017-03-24 RX ADMIN — INSULIN LISPRO 16 UNITS: 100 INJECTION, SOLUTION INTRAVENOUS; SUBCUTANEOUS at 16:47

## 2017-03-24 RX ADMIN — IPRATROPIUM BROMIDE AND ALBUTEROL SULFATE 3 ML: .5; 3 SOLUTION RESPIRATORY (INHALATION) at 20:09

## 2017-03-24 RX ADMIN — PREGABALIN 50 MG: 50 CAPSULE ORAL at 21:37

## 2017-03-24 RX ADMIN — METHYLPREDNISOLONE SODIUM SUCCINATE 20 MG: 40 INJECTION, POWDER, FOR SOLUTION INTRAMUSCULAR; INTRAVENOUS at 09:30

## 2017-03-24 RX ADMIN — CEFEPIME HYDROCHLORIDE 2000 MG: 2 INJECTION, POWDER, FOR SOLUTION INTRAVENOUS at 13:05

## 2017-03-24 RX ADMIN — CEFEPIME HYDROCHLORIDE 2000 MG: 2 INJECTION, POWDER, FOR SOLUTION INTRAVENOUS at 00:28

## 2017-03-24 RX ADMIN — FENOFIBRATE 145 MG: 145 TABLET, FILM COATED ORAL at 09:29

## 2017-03-24 RX ADMIN — METOPROLOL TARTRATE 25 MG: 25 TABLET ORAL at 09:30

## 2017-03-24 RX ADMIN — PREGABALIN 50 MG: 50 CAPSULE ORAL at 16:40

## 2017-03-25 ENCOUNTER — APPOINTMENT (INPATIENT)
Dept: PHYSICAL THERAPY | Facility: HOSPITAL | Age: 57
DRG: 191 | End: 2017-03-25
Payer: MEDICARE

## 2017-03-25 LAB
BACTERIA BLD CULT: NORMAL
BACTERIA BLD CULT: NORMAL
GRAM STN SPEC: NORMAL

## 2017-03-25 PROCEDURE — 94760 N-INVAS EAR/PLS OXIMETRY 1: CPT

## 2017-03-25 PROCEDURE — 97110 THERAPEUTIC EXERCISES: CPT

## 2017-03-25 PROCEDURE — 94640 AIRWAY INHALATION TREATMENT: CPT

## 2017-03-25 PROCEDURE — 82948 REAGENT STRIP/BLOOD GLUCOSE: CPT

## 2017-03-25 RX ADMIN — INSULIN LISPRO 5 UNITS: 100 INJECTION, SOLUTION INTRAVENOUS; SUBCUTANEOUS at 09:28

## 2017-03-25 RX ADMIN — FLUTICASONE PROPIONATE AND SALMETEROL 1 PUFF: 50; 250 POWDER RESPIRATORY (INHALATION) at 21:27

## 2017-03-25 RX ADMIN — INSULIN LISPRO 14 UNITS: 100 INJECTION, SOLUTION INTRAVENOUS; SUBCUTANEOUS at 12:12

## 2017-03-25 RX ADMIN — METOPROLOL TARTRATE 25 MG: 25 TABLET ORAL at 08:15

## 2017-03-25 RX ADMIN — BENZONATATE 100 MG: 100 CAPSULE ORAL at 08:15

## 2017-03-25 RX ADMIN — HEPARIN SODIUM 5000 UNITS: 5000 INJECTION, SOLUTION INTRAVENOUS; SUBCUTANEOUS at 05:46

## 2017-03-25 RX ADMIN — PREGABALIN 50 MG: 50 CAPSULE ORAL at 21:27

## 2017-03-25 RX ADMIN — METOPROLOL TARTRATE 25 MG: 25 TABLET ORAL at 17:00

## 2017-03-25 RX ADMIN — INSULIN LISPRO 14 UNITS: 100 INJECTION, SOLUTION INTRAVENOUS; SUBCUTANEOUS at 08:18

## 2017-03-25 RX ADMIN — VENLAFAXINE HYDROCHLORIDE 150 MG: 150 CAPSULE, EXTENDED RELEASE ORAL at 09:27

## 2017-03-25 RX ADMIN — PREGABALIN 50 MG: 50 CAPSULE ORAL at 16:54

## 2017-03-25 RX ADMIN — HEPARIN SODIUM 5000 UNITS: 5000 INJECTION, SOLUTION INTRAVENOUS; SUBCUTANEOUS at 21:27

## 2017-03-25 RX ADMIN — CEFEPIME HYDROCHLORIDE 2000 MG: 2 INJECTION, POWDER, FOR SOLUTION INTRAVENOUS at 12:12

## 2017-03-25 RX ADMIN — INSULIN LISPRO 4 UNITS: 100 INJECTION, SOLUTION INTRAVENOUS; SUBCUTANEOUS at 12:12

## 2017-03-25 RX ADMIN — INSULIN LISPRO 16 UNITS: 100 INJECTION, SOLUTION INTRAVENOUS; SUBCUTANEOUS at 16:54

## 2017-03-25 RX ADMIN — BENZONATATE 100 MG: 100 CAPSULE ORAL at 16:54

## 2017-03-25 RX ADMIN — INSULIN GLARGINE 30 UNITS: 100 INJECTION, SOLUTION SUBCUTANEOUS at 21:27

## 2017-03-25 RX ADMIN — CEFEPIME HYDROCHLORIDE 2000 MG: 2 INJECTION, POWDER, FOR SOLUTION INTRAVENOUS at 01:20

## 2017-03-25 RX ADMIN — PREGABALIN 50 MG: 50 CAPSULE ORAL at 08:15

## 2017-03-25 RX ADMIN — HYDROCODONE POLISTIREX AND CHLORPHENIRAMINE POLISTIREX 5 ML: 10; 8 SUSPENSION, EXTENDED RELEASE ORAL at 16:58

## 2017-03-25 RX ADMIN — OXYCODONE HYDROCHLORIDE AND ACETAMINOPHEN 1 TABLET: 5; 325 TABLET ORAL at 15:36

## 2017-03-25 RX ADMIN — ZOLPIDEM TARTRATE 10 MG: 5 TABLET, COATED ORAL at 21:28

## 2017-03-25 RX ADMIN — HEPARIN SODIUM 5000 UNITS: 5000 INJECTION, SOLUTION INTRAVENOUS; SUBCUTANEOUS at 15:00

## 2017-03-25 RX ADMIN — METHYLPREDNISOLONE SODIUM SUCCINATE 20 MG: 40 INJECTION, POWDER, FOR SOLUTION INTRAMUSCULAR; INTRAVENOUS at 08:16

## 2017-03-25 RX ADMIN — ASPIRIN 81 MG CHEWABLE TABLET 81 MG: 81 TABLET CHEWABLE at 08:15

## 2017-03-25 RX ADMIN — IPRATROPIUM BROMIDE AND ALBUTEROL SULFATE 3 ML: .5; 3 SOLUTION RESPIRATORY (INHALATION) at 14:32

## 2017-03-25 RX ADMIN — PRAVASTATIN SODIUM 40 MG: 40 TABLET ORAL at 16:55

## 2017-03-25 RX ADMIN — QUETIAPINE 800 MG: 300 TABLET, EXTENDED RELEASE ORAL at 21:28

## 2017-03-25 RX ADMIN — LAMOTRIGINE 100 MG: 100 TABLET ORAL at 21:27

## 2017-03-25 RX ADMIN — BENZONATATE 100 MG: 100 CAPSULE ORAL at 21:27

## 2017-03-25 RX ADMIN — IPRATROPIUM BROMIDE AND ALBUTEROL SULFATE 3 ML: .5; 3 SOLUTION RESPIRATORY (INHALATION) at 08:36

## 2017-03-25 RX ADMIN — MENTHOL 5.4 MG: 5.4 LOZENGE ORAL at 01:31

## 2017-03-25 RX ADMIN — IPRATROPIUM BROMIDE AND ALBUTEROL SULFATE 3 ML: .5; 3 SOLUTION RESPIRATORY (INHALATION) at 20:17

## 2017-03-25 RX ADMIN — PANTOPRAZOLE SODIUM 40 MG: 40 TABLET, DELAYED RELEASE ORAL at 05:46

## 2017-03-25 RX ADMIN — AMLODIPINE BESYLATE 5 MG: 5 TABLET ORAL at 17:00

## 2017-03-25 RX ADMIN — FENOFIBRATE 145 MG: 145 TABLET, FILM COATED ORAL at 08:15

## 2017-03-25 RX ADMIN — INSULIN LISPRO 2 UNITS: 100 INJECTION, SOLUTION INTRAVENOUS; SUBCUTANEOUS at 16:53

## 2017-03-25 RX ADMIN — METHYLPREDNISOLONE SODIUM SUCCINATE 20 MG: 40 INJECTION, POWDER, FOR SOLUTION INTRAMUSCULAR; INTRAVENOUS at 21:27

## 2017-03-25 RX ADMIN — AMLODIPINE BESYLATE 5 MG: 5 TABLET ORAL at 08:15

## 2017-03-25 RX ADMIN — FLUTICASONE PROPIONATE AND SALMETEROL 1 PUFF: 50; 250 POWDER RESPIRATORY (INHALATION) at 08:16

## 2017-03-26 VITALS
DIASTOLIC BLOOD PRESSURE: 100 MMHG | TEMPERATURE: 98.2 F | HEIGHT: 71 IN | WEIGHT: 272.38 LBS | SYSTOLIC BLOOD PRESSURE: 137 MMHG | OXYGEN SATURATION: 94 % | BODY MASS INDEX: 38.13 KG/M2 | RESPIRATION RATE: 18 BRPM | HEART RATE: 68 BPM

## 2017-03-26 LAB
ANION GAP SERPL CALCULATED.3IONS-SCNC: 6 MMOL/L (ref 4–13)
ANION GAP SERPL CALCULATED.3IONS-SCNC: 7 MMOL/L (ref 4–13)
BUN SERPL-MCNC: 22 MG/DL (ref 5–25)
BUN SERPL-MCNC: 23 MG/DL (ref 5–25)
CALCIUM SERPL-MCNC: 9.4 MG/DL (ref 8.3–10.1)
CALCIUM SERPL-MCNC: 9.4 MG/DL (ref 8.3–10.1)
CHLORIDE SERPL-SCNC: 93 MMOL/L (ref 100–108)
CHLORIDE SERPL-SCNC: 94 MMOL/L (ref 100–108)
CO2 SERPL-SCNC: 29 MMOL/L (ref 21–32)
CO2 SERPL-SCNC: 32 MMOL/L (ref 21–32)
CREAT SERPL-MCNC: 0.98 MG/DL (ref 0.6–1.3)
CREAT SERPL-MCNC: 1.23 MG/DL (ref 0.6–1.3)
GFR SERPL CREATININE-BSD FRML MDRD: >60 ML/MIN/1.73SQ M
GFR SERPL CREATININE-BSD FRML MDRD: >60 ML/MIN/1.73SQ M
GLUCOSE SERPL-MCNC: 140 MG/DL (ref 65–140)
GLUCOSE SERPL-MCNC: 214 MG/DL (ref 65–140)
GLUCOSE SERPL-MCNC: 225 MG/DL (ref 65–140)
GLUCOSE SERPL-MCNC: 253 MG/DL (ref 65–140)
GLUCOSE SERPL-MCNC: 254 MG/DL (ref 65–140)
GLUCOSE SERPL-MCNC: 272 MG/DL (ref 65–140)
GLUCOSE SERPL-MCNC: 284 MG/DL (ref 65–140)
GLUCOSE SERPL-MCNC: 317 MG/DL (ref 65–140)
GLUCOSE SERPL-MCNC: 94 MG/DL (ref 65–140)
POTASSIUM SERPL-SCNC: 4.8 MMOL/L (ref 3.5–5.3)
POTASSIUM SERPL-SCNC: 5.4 MMOL/L (ref 3.5–5.3)
SODIUM SERPL-SCNC: 130 MMOL/L (ref 136–145)
SODIUM SERPL-SCNC: 131 MMOL/L (ref 136–145)

## 2017-03-26 PROCEDURE — 82948 REAGENT STRIP/BLOOD GLUCOSE: CPT

## 2017-03-26 PROCEDURE — 80048 BASIC METABOLIC PNL TOTAL CA: CPT | Performed by: INTERNAL MEDICINE

## 2017-03-26 PROCEDURE — 94760 N-INVAS EAR/PLS OXIMETRY 1: CPT

## 2017-03-26 PROCEDURE — 94150 VITAL CAPACITY TEST: CPT

## 2017-03-26 PROCEDURE — 94640 AIRWAY INHALATION TREATMENT: CPT

## 2017-03-26 RX ORDER — BENZONATATE 100 MG/1
100 CAPSULE ORAL 3 TIMES DAILY PRN
Qty: 30 CAPSULE | Refills: 0 | Status: SHIPPED | OUTPATIENT
Start: 2017-03-26 | End: 2017-04-05

## 2017-03-26 RX ORDER — PREDNISONE 20 MG/1
40 TABLET ORAL DAILY
Qty: 14 TABLET | Refills: 0 | Status: SHIPPED | OUTPATIENT
Start: 2017-03-26 | End: 2017-04-02

## 2017-03-26 RX ORDER — PREDNISONE 20 MG/1
40 TABLET ORAL DAILY
Status: DISCONTINUED | OUTPATIENT
Start: 2017-03-26 | End: 2017-03-26 | Stop reason: HOSPADM

## 2017-03-26 RX ADMIN — AMLODIPINE BESYLATE 5 MG: 5 TABLET ORAL at 08:57

## 2017-03-26 RX ADMIN — INSULIN LISPRO 14 UNITS: 100 INJECTION, SOLUTION INTRAVENOUS; SUBCUTANEOUS at 08:54

## 2017-03-26 RX ADMIN — INSULIN LISPRO 2 UNITS: 100 INJECTION, SOLUTION INTRAVENOUS; SUBCUTANEOUS at 08:55

## 2017-03-26 RX ADMIN — BENZONATATE 100 MG: 100 CAPSULE ORAL at 08:57

## 2017-03-26 RX ADMIN — OXYCODONE HYDROCHLORIDE AND ACETAMINOPHEN 1 TABLET: 5; 325 TABLET ORAL at 08:57

## 2017-03-26 RX ADMIN — FENOFIBRATE 145 MG: 145 TABLET, FILM COATED ORAL at 08:57

## 2017-03-26 RX ADMIN — HEPARIN SODIUM 5000 UNITS: 5000 INJECTION, SOLUTION INTRAVENOUS; SUBCUTANEOUS at 05:46

## 2017-03-26 RX ADMIN — FLUTICASONE PROPIONATE AND SALMETEROL 1 PUFF: 50; 250 POWDER RESPIRATORY (INHALATION) at 09:02

## 2017-03-26 RX ADMIN — PREDNISONE 40 MG: 20 TABLET ORAL at 08:59

## 2017-03-26 RX ADMIN — PANTOPRAZOLE SODIUM 40 MG: 40 TABLET, DELAYED RELEASE ORAL at 05:46

## 2017-03-26 RX ADMIN — METOPROLOL TARTRATE 25 MG: 25 TABLET ORAL at 08:58

## 2017-03-26 RX ADMIN — PREGABALIN 50 MG: 50 CAPSULE ORAL at 08:57

## 2017-03-26 RX ADMIN — INSULIN LISPRO 3 UNITS: 100 INJECTION, SOLUTION INTRAVENOUS; SUBCUTANEOUS at 12:32

## 2017-03-26 RX ADMIN — VENLAFAXINE HYDROCHLORIDE 150 MG: 150 CAPSULE, EXTENDED RELEASE ORAL at 08:58

## 2017-03-26 RX ADMIN — CEFEPIME HYDROCHLORIDE 2000 MG: 2 INJECTION, POWDER, FOR SOLUTION INTRAVENOUS at 00:50

## 2017-03-26 RX ADMIN — IPRATROPIUM BROMIDE AND ALBUTEROL SULFATE 3 ML: .5; 3 SOLUTION RESPIRATORY (INHALATION) at 02:45

## 2017-03-26 RX ADMIN — IPRATROPIUM BROMIDE AND ALBUTEROL SULFATE 3 ML: .5; 3 SOLUTION RESPIRATORY (INHALATION) at 08:11

## 2017-03-26 RX ADMIN — ASPIRIN 81 MG CHEWABLE TABLET 81 MG: 81 TABLET CHEWABLE at 08:57

## 2017-03-26 RX ADMIN — INSULIN LISPRO 14 UNITS: 100 INJECTION, SOLUTION INTRAVENOUS; SUBCUTANEOUS at 12:32

## 2017-03-27 ENCOUNTER — GENERIC CONVERSION - ENCOUNTER (OUTPATIENT)
Dept: OTHER | Facility: OTHER | Age: 57
End: 2017-03-27

## 2017-03-27 ENCOUNTER — TRANSCRIBE ORDERS (OUTPATIENT)
Dept: ADMINISTRATIVE | Facility: HOSPITAL | Age: 57
End: 2017-03-27

## 2017-03-27 ENCOUNTER — APPOINTMENT (OUTPATIENT)
Dept: LAB | Facility: HOSPITAL | Age: 57
End: 2017-03-27
Attending: INTERNAL MEDICINE
Payer: MEDICARE

## 2017-03-27 DIAGNOSIS — B96.89 ACUTE BACTERIAL BRONCHITIS: ICD-10-CM

## 2017-03-27 DIAGNOSIS — J20.9 ACUTE BRONCHITIS: ICD-10-CM

## 2017-03-27 DIAGNOSIS — J44.9 COPD (CHRONIC OBSTRUCTIVE PULMONARY DISEASE) (HCC): ICD-10-CM

## 2017-03-27 DIAGNOSIS — R05.9 COUGH: ICD-10-CM

## 2017-03-27 DIAGNOSIS — J20.8 ACUTE BACTERIAL BRONCHITIS: ICD-10-CM

## 2017-03-27 DIAGNOSIS — R10.12 LEFT UPPER QUADRANT PAIN: ICD-10-CM

## 2017-03-27 DIAGNOSIS — N17.9 ACUTE KIDNEY INJURY (HCC): ICD-10-CM

## 2017-03-27 DIAGNOSIS — E66.9 OBESITY: ICD-10-CM

## 2017-03-27 DIAGNOSIS — E11.9 DIABETES MELLITUS (HCC): Chronic | ICD-10-CM

## 2017-03-27 DIAGNOSIS — J18.9 PNEUMONIA: ICD-10-CM

## 2017-03-27 DIAGNOSIS — R07.89 LEFT-SIDED CHEST WALL PAIN: ICD-10-CM

## 2017-03-27 DIAGNOSIS — Z99.89 OSA ON CPAP: ICD-10-CM

## 2017-03-27 DIAGNOSIS — I10 ESSENTIAL HYPERTENSION: Chronic | ICD-10-CM

## 2017-03-27 DIAGNOSIS — F99 PSYCHIATRIC DISORDER: ICD-10-CM

## 2017-03-27 DIAGNOSIS — N17.9 AKI (ACUTE KIDNEY INJURY) (HCC): ICD-10-CM

## 2017-03-27 DIAGNOSIS — E11.65 HYPERGLYCEMIA DUE TO TYPE 2 DIABETES MELLITUS (HCC): ICD-10-CM

## 2017-03-27 DIAGNOSIS — A41.9 SEPSIS (HCC): ICD-10-CM

## 2017-03-27 DIAGNOSIS — G47.33 OSA ON CPAP: ICD-10-CM

## 2017-03-27 DIAGNOSIS — J44.1 COPD EXACERBATION (HCC): ICD-10-CM

## 2017-03-27 DIAGNOSIS — R53.82 CHRONIC FATIGUE SYNDROME: Chronic | ICD-10-CM

## 2017-03-27 DIAGNOSIS — J20.5 ACUTE BRONCHITIS DUE TO RESPIRATORY SYNCYTIAL VIRUS (RSV): ICD-10-CM

## 2017-03-27 DIAGNOSIS — R53.83 FATIGUE, UNSPECIFIED TYPE: ICD-10-CM

## 2017-03-27 DIAGNOSIS — F31.9 BIPOLAR AFFECTIVE (HCC): ICD-10-CM

## 2017-03-27 LAB
ANION GAP SERPL CALCULATED.3IONS-SCNC: 10 MMOL/L (ref 4–13)
BUN SERPL-MCNC: 29 MG/DL (ref 5–25)
CALCIUM SERPL-MCNC: 9.4 MG/DL (ref 8.3–10.1)
CHLORIDE SERPL-SCNC: 91 MMOL/L (ref 100–108)
CO2 SERPL-SCNC: 28 MMOL/L (ref 21–32)
CREAT SERPL-MCNC: 1.28 MG/DL (ref 0.6–1.3)
GFR SERPL CREATININE-BSD FRML MDRD: 57.9 ML/MIN/1.73SQ M
GLUCOSE SERPL-MCNC: 404 MG/DL (ref 65–140)
POTASSIUM SERPL-SCNC: 4.3 MMOL/L (ref 3.5–5.3)
SODIUM SERPL-SCNC: 129 MMOL/L (ref 136–145)

## 2017-03-27 PROCEDURE — 80048 BASIC METABOLIC PNL TOTAL CA: CPT

## 2017-03-27 PROCEDURE — 36415 COLL VENOUS BLD VENIPUNCTURE: CPT

## 2017-04-03 ENCOUNTER — ALLSCRIPTS OFFICE VISIT (OUTPATIENT)
Dept: OTHER | Facility: OTHER | Age: 57
End: 2017-04-03

## 2017-04-05 ENCOUNTER — ALLSCRIPTS OFFICE VISIT (OUTPATIENT)
Dept: OTHER | Facility: OTHER | Age: 57
End: 2017-04-05

## 2017-04-05 DIAGNOSIS — E87.1 HYPO-OSMOLALITY AND HYPONATREMIA: ICD-10-CM

## 2017-04-05 DIAGNOSIS — Z87.891 PERSONAL HISTORY OF NICOTINE DEPENDENCE: ICD-10-CM

## 2017-04-21 ENCOUNTER — GENERIC CONVERSION - ENCOUNTER (OUTPATIENT)
Dept: OTHER | Facility: OTHER | Age: 57
End: 2017-04-21

## 2017-07-21 ENCOUNTER — ALLSCRIPTS OFFICE VISIT (OUTPATIENT)
Dept: OTHER | Facility: OTHER | Age: 57
End: 2017-07-21

## 2017-07-21 LAB — HBA1C MFR BLD HPLC: 8 %

## 2017-08-10 ENCOUNTER — HOSPITAL ENCOUNTER (OUTPATIENT)
Dept: RADIOLOGY | Facility: HOSPITAL | Age: 57
Discharge: HOME | End: 2017-08-10
Payer: MEDICARE

## 2017-08-10 DIAGNOSIS — Z87.891 PERSONAL HISTORY OF NICOTINE DEPENDENCE: ICD-10-CM

## 2017-08-22 ENCOUNTER — GENERIC CONVERSION - ENCOUNTER (OUTPATIENT)
Dept: OTHER | Facility: OTHER | Age: 57
End: 2017-08-22

## 2017-09-06 ENCOUNTER — HOSPITAL ENCOUNTER (INPATIENT)
Facility: HOSPITAL | Age: 57
LOS: 4 days | Discharge: HOME | DRG: 392 | End: 2017-09-11
Attending: EMERGENCY MEDICINE | Admitting: STUDENT IN AN ORGANIZED HEALTH CARE EDUCATION/TRAINING PROGRAM
Payer: MEDICARE

## 2017-09-06 ENCOUNTER — APPOINTMENT (EMERGENCY)
Dept: RADIOLOGY | Facility: HOSPITAL | Age: 57
DRG: 392 | End: 2017-09-06
Payer: MEDICARE

## 2017-09-06 DIAGNOSIS — R19.7 DIARRHEA IN ADULT PATIENT: ICD-10-CM

## 2017-09-06 DIAGNOSIS — R10.9 ABDOMINAL PAIN: Primary | ICD-10-CM

## 2017-09-06 PROBLEM — I25.10 CAD (CORONARY ARTERY DISEASE): Status: ACTIVE | Noted: 2017-09-06

## 2017-09-06 PROBLEM — J44.1 COPD EXACERBATION (HCC): Status: RESOLVED | Noted: 2017-03-20 | Resolved: 2017-09-06

## 2017-09-06 PROBLEM — K21.9 GERD (GASTROESOPHAGEAL REFLUX DISEASE): Status: ACTIVE | Noted: 2017-09-06

## 2017-09-06 PROBLEM — J20.9 ACUTE BRONCHITIS: Status: RESOLVED | Noted: 2017-03-20 | Resolved: 2017-09-06

## 2017-09-06 PROBLEM — B96.89 ACUTE BACTERIAL BRONCHITIS: Status: RESOLVED | Noted: 2017-03-22 | Resolved: 2017-09-06

## 2017-09-06 PROBLEM — R07.89 LEFT-SIDED CHEST WALL PAIN: Status: RESOLVED | Noted: 2017-03-20 | Resolved: 2017-09-06

## 2017-09-06 PROBLEM — N17.9 ACUTE KIDNEY INJURY (HCC): Status: RESOLVED | Noted: 2017-03-20 | Resolved: 2017-09-06

## 2017-09-06 PROBLEM — J20.5 ACUTE BRONCHITIS DUE TO RESPIRATORY SYNCYTIAL VIRUS (RSV): Status: RESOLVED | Noted: 2017-03-21 | Resolved: 2017-09-06

## 2017-09-06 PROBLEM — J20.8 ACUTE BACTERIAL BRONCHITIS: Status: RESOLVED | Noted: 2017-03-22 | Resolved: 2017-09-06

## 2017-09-06 LAB
ALBUMIN SERPL BCP-MCNC: 4.5 G/DL (ref 3.5–5)
ALP SERPL-CCNC: 64 U/L (ref 46–116)
ALT SERPL W P-5'-P-CCNC: 48 U/L (ref 12–78)
ANION GAP SERPL CALCULATED.3IONS-SCNC: 9 MMOL/L (ref 4–13)
AST SERPL W P-5'-P-CCNC: 30 U/L (ref 5–45)
BASOPHILS # BLD AUTO: 0 THOUSANDS/ΜL (ref 0–0.1)
BASOPHILS NFR BLD AUTO: 0 % (ref 0–1)
BILIRUB SERPL-MCNC: 0.5 MG/DL (ref 0.2–1)
BILIRUB UR QL STRIP: NEGATIVE
BUN SERPL-MCNC: 11 MG/DL (ref 5–25)
CALCIUM SERPL-MCNC: 9.6 MG/DL (ref 8.3–10.1)
CHLORIDE SERPL-SCNC: 97 MMOL/L (ref 100–108)
CLARITY UR: CLEAR
CO2 SERPL-SCNC: 28 MMOL/L (ref 21–32)
COLOR UR: YELLOW
CREAT SERPL-MCNC: 1.08 MG/DL (ref 0.6–1.3)
EOSINOPHIL # BLD AUTO: 0.1 THOUSAND/ΜL (ref 0–0.61)
EOSINOPHIL NFR BLD AUTO: 1 % (ref 0–6)
ERYTHROCYTE [DISTWIDTH] IN BLOOD BY AUTOMATED COUNT: 13.5 % (ref 11.6–15.1)
GFR SERPL CREATININE-BSD FRML MDRD: 76 ML/MIN/1.73SQ M
GLUCOSE SERPL-MCNC: 129 MG/DL (ref 65–140)
GLUCOSE SERPL-MCNC: 145 MG/DL (ref 65–140)
GLUCOSE SERPL-MCNC: 152 MG/DL (ref 65–140)
GLUCOSE SERPL-MCNC: 176 MG/DL (ref 65–140)
GLUCOSE UR STRIP-MCNC: ABNORMAL MG/DL
HCT VFR BLD AUTO: 47.3 % (ref 42–52)
HGB BLD-MCNC: 16.1 G/DL (ref 14–18)
HGB UR QL STRIP.AUTO: NEGATIVE
KETONES UR STRIP-MCNC: NEGATIVE MG/DL
LACTATE SERPL-SCNC: 1.9 MMOL/L (ref 0.5–2)
LEUKOCYTE ESTERASE UR QL STRIP: NEGATIVE
LIPASE SERPL-CCNC: 85 U/L (ref 73–393)
LYMPHOCYTES # BLD AUTO: 3.1 THOUSANDS/ΜL (ref 0.6–4.47)
LYMPHOCYTES NFR BLD AUTO: 29 % (ref 14–44)
MCH RBC QN AUTO: 31.6 PG (ref 27–31)
MCHC RBC AUTO-ENTMCNC: 34.1 G/DL (ref 31.4–37.4)
MCV RBC AUTO: 93 FL (ref 82–98)
MONOCYTES # BLD AUTO: 0.6 THOUSAND/ΜL (ref 0.17–1.22)
MONOCYTES NFR BLD AUTO: 6 % (ref 4–12)
NEUTROPHILS # BLD AUTO: 6.8 THOUSANDS/ΜL (ref 1.85–7.62)
NEUTS SEG NFR BLD AUTO: 64 % (ref 43–75)
NITRITE UR QL STRIP: NEGATIVE
NRBC BLD AUTO-RTO: 0 /100 WBCS
PH UR STRIP.AUTO: 6.5 [PH] (ref 5–9)
PLATELET # BLD AUTO: 198 THOUSANDS/UL (ref 130–400)
PMV BLD AUTO: 7.8 FL (ref 8.9–12.7)
POTASSIUM SERPL-SCNC: 3.9 MMOL/L (ref 3.5–5.3)
PROT SERPL-MCNC: 7.7 G/DL (ref 6.4–8.2)
PROT UR STRIP-MCNC: NEGATIVE MG/DL
RBC # BLD AUTO: 5.11 MILLION/UL (ref 4.7–6.1)
SODIUM SERPL-SCNC: 134 MMOL/L (ref 136–145)
SP GR UR STRIP.AUTO: <=1.005 (ref 1–1.03)
UROBILINOGEN UR QL STRIP.AUTO: 0.2 E.U./DL
WBC # BLD AUTO: 10.5 THOUSAND/UL (ref 4.8–10.8)

## 2017-09-06 PROCEDURE — 93005 ELECTROCARDIOGRAM TRACING: CPT | Performed by: EMERGENCY MEDICINE

## 2017-09-06 PROCEDURE — 82948 REAGENT STRIP/BLOOD GLUCOSE: CPT

## 2017-09-06 PROCEDURE — 96374 THER/PROPH/DIAG INJ IV PUSH: CPT

## 2017-09-06 PROCEDURE — 81003 URINALYSIS AUTO W/O SCOPE: CPT | Performed by: EMERGENCY MEDICINE

## 2017-09-06 PROCEDURE — 87040 BLOOD CULTURE FOR BACTERIA: CPT | Performed by: EMERGENCY MEDICINE

## 2017-09-06 PROCEDURE — 96361 HYDRATE IV INFUSION ADD-ON: CPT

## 2017-09-06 PROCEDURE — 74177 CT ABD & PELVIS W/CONTRAST: CPT

## 2017-09-06 PROCEDURE — 85025 COMPLETE CBC W/AUTO DIFF WBC: CPT | Performed by: EMERGENCY MEDICINE

## 2017-09-06 PROCEDURE — 99285 EMERGENCY DEPT VISIT HI MDM: CPT

## 2017-09-06 PROCEDURE — 83690 ASSAY OF LIPASE: CPT | Performed by: EMERGENCY MEDICINE

## 2017-09-06 PROCEDURE — 94660 CPAP INITIATION&MGMT: CPT

## 2017-09-06 PROCEDURE — 87081 CULTURE SCREEN ONLY: CPT | Performed by: STUDENT IN AN ORGANIZED HEALTH CARE EDUCATION/TRAINING PROGRAM

## 2017-09-06 PROCEDURE — 71010 HB CHEST X-RAY 1 VIEW FRONTAL (PORTABLE): CPT

## 2017-09-06 PROCEDURE — 96376 TX/PRO/DX INJ SAME DRUG ADON: CPT

## 2017-09-06 PROCEDURE — 96375 TX/PRO/DX INJ NEW DRUG ADDON: CPT

## 2017-09-06 PROCEDURE — 80053 COMPREHEN METABOLIC PANEL: CPT | Performed by: EMERGENCY MEDICINE

## 2017-09-06 PROCEDURE — 36415 COLL VENOUS BLD VENIPUNCTURE: CPT | Performed by: EMERGENCY MEDICINE

## 2017-09-06 PROCEDURE — 83605 ASSAY OF LACTIC ACID: CPT | Performed by: EMERGENCY MEDICINE

## 2017-09-06 RX ORDER — ALBUTEROL SULFATE 2.5 MG/3ML
2.5 SOLUTION RESPIRATORY (INHALATION) EVERY 6 HOURS PRN
Status: DISCONTINUED | OUTPATIENT
Start: 2017-09-06 | End: 2017-09-11 | Stop reason: HOSPADM

## 2017-09-06 RX ORDER — AMLODIPINE BESYLATE 5 MG/1
5 TABLET ORAL 2 TIMES DAILY
Status: DISCONTINUED | OUTPATIENT
Start: 2017-09-06 | End: 2017-09-11 | Stop reason: HOSPADM

## 2017-09-06 RX ORDER — SODIUM CHLORIDE 9 MG/ML
75 INJECTION, SOLUTION INTRAVENOUS CONTINUOUS
Status: DISCONTINUED | OUTPATIENT
Start: 2017-09-06 | End: 2017-09-09

## 2017-09-06 RX ORDER — ASPIRIN 81 MG/1
81 TABLET, CHEWABLE ORAL DAILY
Status: DISCONTINUED | OUTPATIENT
Start: 2017-09-07 | End: 2017-09-11 | Stop reason: HOSPADM

## 2017-09-06 RX ORDER — INSULIN GLARGINE 100 [IU]/ML
15 INJECTION, SOLUTION SUBCUTANEOUS
Status: DISCONTINUED | OUTPATIENT
Start: 2017-09-06 | End: 2017-09-09

## 2017-09-06 RX ORDER — ALPRAZOLAM 0.5 MG/1
2 TABLET ORAL
Status: DISCONTINUED | OUTPATIENT
Start: 2017-09-06 | End: 2017-09-11 | Stop reason: HOSPADM

## 2017-09-06 RX ORDER — ONDANSETRON 2 MG/ML
4 INJECTION INTRAMUSCULAR; INTRAVENOUS ONCE
Status: COMPLETED | OUTPATIENT
Start: 2017-09-06 | End: 2017-09-06

## 2017-09-06 RX ORDER — ONDANSETRON 2 MG/ML
4 INJECTION INTRAMUSCULAR; INTRAVENOUS EVERY 6 HOURS PRN
Status: DISCONTINUED | OUTPATIENT
Start: 2017-09-06 | End: 2017-09-11 | Stop reason: HOSPADM

## 2017-09-06 RX ORDER — PANTOPRAZOLE SODIUM 20 MG/1
20 TABLET, DELAYED RELEASE ORAL
Status: DISCONTINUED | OUTPATIENT
Start: 2017-09-07 | End: 2017-09-11 | Stop reason: HOSPADM

## 2017-09-06 RX ORDER — PRAVASTATIN SODIUM 40 MG
40 TABLET ORAL
Status: DISCONTINUED | OUTPATIENT
Start: 2017-09-06 | End: 2017-09-11 | Stop reason: HOSPADM

## 2017-09-06 RX ORDER — MORPHINE SULFATE 4 MG/ML
4 INJECTION, SOLUTION INTRAMUSCULAR; INTRAVENOUS EVERY 4 HOURS PRN
Status: DISCONTINUED | OUTPATIENT
Start: 2017-09-06 | End: 2017-09-08

## 2017-09-06 RX ORDER — FENOFIBRATE 48 MG/1
160 TABLET, COATED ORAL DAILY
Status: DISCONTINUED | OUTPATIENT
Start: 2017-09-07 | End: 2017-09-11 | Stop reason: HOSPADM

## 2017-09-06 RX ORDER — HYDROXYZINE HYDROCHLORIDE 25 MG/1
50 TABLET, FILM COATED ORAL
Status: DISCONTINUED | OUTPATIENT
Start: 2017-09-06 | End: 2017-09-11 | Stop reason: HOSPADM

## 2017-09-06 RX ORDER — PREGABALIN 50 MG/1
50 CAPSULE ORAL 3 TIMES DAILY
Status: DISCONTINUED | OUTPATIENT
Start: 2017-09-06 | End: 2017-09-11 | Stop reason: HOSPADM

## 2017-09-06 RX ORDER — LISINOPRIL 20 MG/1
20 TABLET ORAL DAILY
Status: DISCONTINUED | OUTPATIENT
Start: 2017-09-07 | End: 2017-09-11 | Stop reason: HOSPADM

## 2017-09-06 RX ORDER — VENLAFAXINE 37.5 MG/1
150 TABLET ORAL EVERY MORNING
Status: DISCONTINUED | OUTPATIENT
Start: 2017-09-07 | End: 2017-09-11 | Stop reason: HOSPADM

## 2017-09-06 RX ORDER — ALPRAZOLAM 2 MG/1
2 TABLET ORAL
COMMUNITY
End: 2019-06-28

## 2017-09-06 RX ADMIN — HYDROMORPHONE HYDROCHLORIDE 1 MG: 1 INJECTION, SOLUTION INTRAMUSCULAR; INTRAVENOUS; SUBCUTANEOUS at 14:12

## 2017-09-06 RX ADMIN — AMLODIPINE BESYLATE 5 MG: 5 TABLET ORAL at 18:22

## 2017-09-06 RX ADMIN — ONDANSETRON 4 MG: 2 INJECTION INTRAMUSCULAR; INTRAVENOUS at 14:09

## 2017-09-06 RX ADMIN — HYDROXYZINE HYDROCHLORIDE 50 MG: 25 TABLET, FILM COATED ORAL at 22:04

## 2017-09-06 RX ADMIN — MORPHINE SULFATE 4 MG: 4 INJECTION, SOLUTION INTRAMUSCULAR; INTRAVENOUS at 23:29

## 2017-09-06 RX ADMIN — PRAVASTATIN SODIUM 40 MG: 40 TABLET ORAL at 18:22

## 2017-09-06 RX ADMIN — SODIUM CHLORIDE 1000 ML: 0.9 INJECTION, SOLUTION INTRAVENOUS at 11:43

## 2017-09-06 RX ADMIN — HYDROMORPHONE HYDROCHLORIDE 1 MG: 1 INJECTION, SOLUTION INTRAMUSCULAR; INTRAVENOUS; SUBCUTANEOUS at 11:47

## 2017-09-06 RX ADMIN — IOHEXOL 100 ML: 350 INJECTION, SOLUTION INTRAVENOUS at 13:23

## 2017-09-06 RX ADMIN — SODIUM CHLORIDE 100 ML/HR: 0.9 INJECTION, SOLUTION INTRAVENOUS at 18:23

## 2017-09-06 RX ADMIN — METOPROLOL TARTRATE 25 MG: 25 TABLET ORAL at 18:22

## 2017-09-06 RX ADMIN — PREGABALIN 50 MG: 50 CAPSULE ORAL at 22:03

## 2017-09-06 RX ADMIN — QUETIAPINE 800 MG: 300 TABLET, EXTENDED RELEASE ORAL at 22:04

## 2017-09-06 RX ADMIN — INSULIN GLARGINE 15 UNITS: 100 INJECTION, SOLUTION SUBCUTANEOUS at 22:03

## 2017-09-06 RX ADMIN — MORPHINE SULFATE 4 MG: 4 INJECTION, SOLUTION INTRAMUSCULAR; INTRAVENOUS at 19:19

## 2017-09-06 RX ADMIN — ALPRAZOLAM 2 MG: 0.5 TABLET ORAL at 22:09

## 2017-09-06 RX ADMIN — FLUTICASONE PROPIONATE AND SALMETEROL 1 PUFF: 50; 250 POWDER RESPIRATORY (INHALATION) at 22:03

## 2017-09-07 PROBLEM — R19.7 DIARRHEA IN ADULT PATIENT: Status: ACTIVE | Noted: 2017-09-06

## 2017-09-07 LAB
ANION GAP SERPL CALCULATED.3IONS-SCNC: 10 MMOL/L (ref 4–13)
BUN SERPL-MCNC: 13 MG/DL (ref 5–25)
C DIFF TOX GENS STL QL NAA+PROBE: NORMAL
CALCIUM SERPL-MCNC: 8.2 MG/DL (ref 8.3–10.1)
CHLORIDE SERPL-SCNC: 100 MMOL/L (ref 100–108)
CO2 SERPL-SCNC: 26 MMOL/L (ref 21–32)
CREAT SERPL-MCNC: 0.82 MG/DL (ref 0.6–1.3)
ERYTHROCYTE [DISTWIDTH] IN BLOOD BY AUTOMATED COUNT: 13.2 % (ref 11.6–15.1)
GFR SERPL CREATININE-BSD FRML MDRD: 98 ML/MIN/1.73SQ M
GLUCOSE SERPL-MCNC: 143 MG/DL (ref 65–140)
GLUCOSE SERPL-MCNC: 157 MG/DL (ref 65–140)
GLUCOSE SERPL-MCNC: 158 MG/DL (ref 65–140)
GLUCOSE SERPL-MCNC: 163 MG/DL (ref 65–140)
GLUCOSE SERPL-MCNC: 246 MG/DL (ref 65–140)
HCT VFR BLD AUTO: 41.2 % (ref 42–52)
HGB BLD-MCNC: 14 G/DL (ref 14–18)
MCH RBC QN AUTO: 31.4 PG (ref 27–31)
MCHC RBC AUTO-ENTMCNC: 34 G/DL (ref 31.4–37.4)
MCV RBC AUTO: 92 FL (ref 82–98)
PLATELET # BLD AUTO: 158 THOUSANDS/UL (ref 130–400)
PMV BLD AUTO: 7.9 FL (ref 8.9–12.7)
POTASSIUM SERPL-SCNC: 3.9 MMOL/L (ref 3.5–5.3)
RBC # BLD AUTO: 4.46 MILLION/UL (ref 4.7–6.1)
SODIUM SERPL-SCNC: 136 MMOL/L (ref 136–145)
WBC # BLD AUTO: 7.4 THOUSAND/UL (ref 4.8–10.8)

## 2017-09-07 PROCEDURE — 87046 STOOL CULTR AEROBIC BACT EA: CPT | Performed by: STUDENT IN AN ORGANIZED HEALTH CARE EDUCATION/TRAINING PROGRAM

## 2017-09-07 PROCEDURE — 85027 COMPLETE CBC AUTOMATED: CPT | Performed by: STUDENT IN AN ORGANIZED HEALTH CARE EDUCATION/TRAINING PROGRAM

## 2017-09-07 PROCEDURE — 87493 C DIFF AMPLIFIED PROBE: CPT | Performed by: STUDENT IN AN ORGANIZED HEALTH CARE EDUCATION/TRAINING PROGRAM

## 2017-09-07 PROCEDURE — 87045 FECES CULTURE AEROBIC BACT: CPT | Performed by: STUDENT IN AN ORGANIZED HEALTH CARE EDUCATION/TRAINING PROGRAM

## 2017-09-07 PROCEDURE — 87899 AGENT NOS ASSAY W/OPTIC: CPT | Performed by: STUDENT IN AN ORGANIZED HEALTH CARE EDUCATION/TRAINING PROGRAM

## 2017-09-07 PROCEDURE — 87015 SPECIMEN INFECT AGNT CONCNTJ: CPT | Performed by: STUDENT IN AN ORGANIZED HEALTH CARE EDUCATION/TRAINING PROGRAM

## 2017-09-07 PROCEDURE — 80048 BASIC METABOLIC PNL TOTAL CA: CPT | Performed by: STUDENT IN AN ORGANIZED HEALTH CARE EDUCATION/TRAINING PROGRAM

## 2017-09-07 PROCEDURE — 94660 CPAP INITIATION&MGMT: CPT

## 2017-09-07 PROCEDURE — 94760 N-INVAS EAR/PLS OXIMETRY 1: CPT

## 2017-09-07 PROCEDURE — 82948 REAGENT STRIP/BLOOD GLUCOSE: CPT

## 2017-09-07 RX ADMIN — ALPRAZOLAM 2 MG: 0.5 TABLET ORAL at 21:12

## 2017-09-07 RX ADMIN — ENOXAPARIN SODIUM 40 MG: 40 INJECTION SUBCUTANEOUS at 08:45

## 2017-09-07 RX ADMIN — HYDROXYZINE HYDROCHLORIDE 50 MG: 25 TABLET, FILM COATED ORAL at 21:06

## 2017-09-07 RX ADMIN — MORPHINE SULFATE 4 MG: 4 INJECTION, SOLUTION INTRAMUSCULAR; INTRAVENOUS at 10:54

## 2017-09-07 RX ADMIN — MORPHINE SULFATE 4 MG: 4 INJECTION, SOLUTION INTRAMUSCULAR; INTRAVENOUS at 05:57

## 2017-09-07 RX ADMIN — INSULIN LISPRO 4 UNITS: 100 INJECTION, SOLUTION INTRAVENOUS; SUBCUTANEOUS at 20:09

## 2017-09-07 RX ADMIN — PREGABALIN 50 MG: 50 CAPSULE ORAL at 21:06

## 2017-09-07 RX ADMIN — AMLODIPINE BESYLATE 5 MG: 5 TABLET ORAL at 17:07

## 2017-09-07 RX ADMIN — INSULIN LISPRO 2 UNITS: 100 INJECTION, SOLUTION INTRAVENOUS; SUBCUTANEOUS at 08:46

## 2017-09-07 RX ADMIN — LISINOPRIL 20 MG: 20 TABLET ORAL at 08:45

## 2017-09-07 RX ADMIN — SODIUM CHLORIDE 100 ML/HR: 0.9 INJECTION, SOLUTION INTRAVENOUS at 13:54

## 2017-09-07 RX ADMIN — METOPROLOL TARTRATE 25 MG: 25 TABLET ORAL at 17:07

## 2017-09-07 RX ADMIN — FLUTICASONE PROPIONATE AND SALMETEROL 1 PUFF: 50; 250 POWDER RESPIRATORY (INHALATION) at 21:05

## 2017-09-07 RX ADMIN — ASPIRIN 81 MG 81 MG: 81 TABLET ORAL at 08:45

## 2017-09-07 RX ADMIN — AMLODIPINE BESYLATE 5 MG: 5 TABLET ORAL at 08:44

## 2017-09-07 RX ADMIN — FENOFIBRATE 168 MG: 48 TABLET ORAL at 08:43

## 2017-09-07 RX ADMIN — MORPHINE SULFATE 4 MG: 4 INJECTION, SOLUTION INTRAMUSCULAR; INTRAVENOUS at 15:31

## 2017-09-07 RX ADMIN — METOPROLOL TARTRATE 25 MG: 25 TABLET ORAL at 08:45

## 2017-09-07 RX ADMIN — QUETIAPINE 800 MG: 300 TABLET, EXTENDED RELEASE ORAL at 21:06

## 2017-09-07 RX ADMIN — INSULIN GLARGINE 15 UNITS: 100 INJECTION, SOLUTION SUBCUTANEOUS at 21:05

## 2017-09-07 RX ADMIN — MORPHINE SULFATE 4 MG: 4 INJECTION, SOLUTION INTRAMUSCULAR; INTRAVENOUS at 21:12

## 2017-09-07 RX ADMIN — VENLAFAXINE 150 MG: 37.5 TABLET ORAL at 08:44

## 2017-09-07 RX ADMIN — PRAVASTATIN SODIUM 40 MG: 40 TABLET ORAL at 16:42

## 2017-09-07 RX ADMIN — PREGABALIN 50 MG: 50 CAPSULE ORAL at 16:42

## 2017-09-07 RX ADMIN — FLUTICASONE PROPIONATE AND SALMETEROL 1 PUFF: 50; 250 POWDER RESPIRATORY (INHALATION) at 08:48

## 2017-09-07 RX ADMIN — PREGABALIN 50 MG: 50 CAPSULE ORAL at 08:44

## 2017-09-07 RX ADMIN — PANTOPRAZOLE SODIUM 20 MG: 20 TABLET, DELAYED RELEASE ORAL at 05:57

## 2017-09-08 LAB
ANION GAP SERPL CALCULATED.3IONS-SCNC: 7 MMOL/L (ref 4–13)
BUN SERPL-MCNC: 11 MG/DL (ref 5–25)
CALCIUM SERPL-MCNC: 8.1 MG/DL (ref 8.3–10.1)
CHLORIDE SERPL-SCNC: 101 MMOL/L (ref 100–108)
CO2 SERPL-SCNC: 29 MMOL/L (ref 21–32)
CREAT SERPL-MCNC: 0.91 MG/DL (ref 0.6–1.3)
ERYTHROCYTE [DISTWIDTH] IN BLOOD BY AUTOMATED COUNT: 12.7 % (ref 11.6–15.1)
GFR SERPL CREATININE-BSD FRML MDRD: 93 ML/MIN/1.73SQ M
GLUCOSE SERPL-MCNC: 151 MG/DL (ref 65–140)
GLUCOSE SERPL-MCNC: 164 MG/DL (ref 65–140)
GLUCOSE SERPL-MCNC: 171 MG/DL (ref 65–140)
GLUCOSE SERPL-MCNC: 173 MG/DL (ref 65–140)
GLUCOSE SERPL-MCNC: 189 MG/DL (ref 65–140)
GLUCOSE SERPL-MCNC: 192 MG/DL (ref 65–140)
GLUCOSE SERPL-MCNC: 200 MG/DL (ref 65–140)
GLUCOSE SERPL-MCNC: 247 MG/DL (ref 65–140)
HCT VFR BLD AUTO: 40.6 % (ref 42–52)
HGB BLD-MCNC: 13.8 G/DL (ref 14–18)
MCH RBC QN AUTO: 31 PG (ref 27–31)
MCHC RBC AUTO-ENTMCNC: 33.9 G/DL (ref 31.4–37.4)
MCV RBC AUTO: 91 FL (ref 82–98)
MRSA NOSE QL CULT: NORMAL
PLATELET # BLD AUTO: 143 THOUSANDS/UL (ref 130–400)
PMV BLD AUTO: 8.4 FL (ref 8.9–12.7)
POTASSIUM SERPL-SCNC: 4 MMOL/L (ref 3.5–5.3)
RBC # BLD AUTO: 4.44 MILLION/UL (ref 4.7–6.1)
SODIUM SERPL-SCNC: 137 MMOL/L (ref 136–145)
WBC # BLD AUTO: 5.9 THOUSAND/UL (ref 4.8–10.8)

## 2017-09-08 PROCEDURE — 94660 CPAP INITIATION&MGMT: CPT

## 2017-09-08 PROCEDURE — 80048 BASIC METABOLIC PNL TOTAL CA: CPT | Performed by: STUDENT IN AN ORGANIZED HEALTH CARE EDUCATION/TRAINING PROGRAM

## 2017-09-08 PROCEDURE — 82948 REAGENT STRIP/BLOOD GLUCOSE: CPT

## 2017-09-08 PROCEDURE — 94760 N-INVAS EAR/PLS OXIMETRY 1: CPT

## 2017-09-08 PROCEDURE — 85027 COMPLETE CBC AUTOMATED: CPT | Performed by: STUDENT IN AN ORGANIZED HEALTH CARE EDUCATION/TRAINING PROGRAM

## 2017-09-08 RX ORDER — OXYCODONE HYDROCHLORIDE AND ACETAMINOPHEN 5; 325 MG/1; MG/1
1 TABLET ORAL EVERY 4 HOURS PRN
Status: DISCONTINUED | OUTPATIENT
Start: 2017-09-08 | End: 2017-09-11 | Stop reason: HOSPADM

## 2017-09-08 RX ORDER — MORPHINE SULFATE 2 MG/ML
2 INJECTION, SOLUTION INTRAMUSCULAR; INTRAVENOUS EVERY 6 HOURS PRN
Status: DISCONTINUED | OUTPATIENT
Start: 2017-09-08 | End: 2017-09-09

## 2017-09-08 RX ADMIN — OXYCODONE HYDROCHLORIDE AND ACETAMINOPHEN 1 TABLET: 5; 325 TABLET ORAL at 14:58

## 2017-09-08 RX ADMIN — LISINOPRIL 20 MG: 20 TABLET ORAL at 09:09

## 2017-09-08 RX ADMIN — FLUTICASONE PROPIONATE AND SALMETEROL 1 PUFF: 50; 250 POWDER RESPIRATORY (INHALATION) at 09:09

## 2017-09-08 RX ADMIN — ENOXAPARIN SODIUM 40 MG: 40 INJECTION SUBCUTANEOUS at 09:09

## 2017-09-08 RX ADMIN — PRAVASTATIN SODIUM 40 MG: 40 TABLET ORAL at 16:58

## 2017-09-08 RX ADMIN — ALPRAZOLAM 2 MG: 0.5 TABLET ORAL at 21:38

## 2017-09-08 RX ADMIN — VENLAFAXINE 150 MG: 37.5 TABLET ORAL at 09:21

## 2017-09-08 RX ADMIN — SODIUM CHLORIDE 100 ML/HR: 0.9 INJECTION, SOLUTION INTRAVENOUS at 09:07

## 2017-09-08 RX ADMIN — AMLODIPINE BESYLATE 5 MG: 5 TABLET ORAL at 09:09

## 2017-09-08 RX ADMIN — INSULIN LISPRO 4 UNITS: 100 INJECTION, SOLUTION INTRAVENOUS; SUBCUTANEOUS at 17:05

## 2017-09-08 RX ADMIN — ASPIRIN 81 MG 81 MG: 81 TABLET ORAL at 09:10

## 2017-09-08 RX ADMIN — FLUTICASONE PROPIONATE AND SALMETEROL 1 PUFF: 50; 250 POWDER RESPIRATORY (INHALATION) at 21:38

## 2017-09-08 RX ADMIN — FENOFIBRATE 168 MG: 48 TABLET ORAL at 15:00

## 2017-09-08 RX ADMIN — INSULIN GLARGINE 15 UNITS: 100 INJECTION, SOLUTION SUBCUTANEOUS at 21:38

## 2017-09-08 RX ADMIN — PANTOPRAZOLE SODIUM 20 MG: 20 TABLET, DELAYED RELEASE ORAL at 05:37

## 2017-09-08 RX ADMIN — METOPROLOL TARTRATE 25 MG: 25 TABLET ORAL at 16:58

## 2017-09-08 RX ADMIN — SODIUM CHLORIDE 100 ML/HR: 0.9 INJECTION, SOLUTION INTRAVENOUS at 19:19

## 2017-09-08 RX ADMIN — HYDROXYZINE HYDROCHLORIDE 50 MG: 25 TABLET, FILM COATED ORAL at 21:38

## 2017-09-08 RX ADMIN — METOPROLOL TARTRATE 25 MG: 25 TABLET ORAL at 09:10

## 2017-09-08 RX ADMIN — MORPHINE SULFATE 4 MG: 4 INJECTION, SOLUTION INTRAMUSCULAR; INTRAVENOUS at 09:10

## 2017-09-08 RX ADMIN — PREGABALIN 50 MG: 50 CAPSULE ORAL at 21:38

## 2017-09-08 RX ADMIN — PREGABALIN 50 MG: 50 CAPSULE ORAL at 09:10

## 2017-09-08 RX ADMIN — QUETIAPINE 800 MG: 300 TABLET, EXTENDED RELEASE ORAL at 21:39

## 2017-09-08 RX ADMIN — OXYCODONE HYDROCHLORIDE AND ACETAMINOPHEN 1 TABLET: 5; 325 TABLET ORAL at 19:21

## 2017-09-08 RX ADMIN — AMLODIPINE BESYLATE 5 MG: 5 TABLET ORAL at 16:59

## 2017-09-08 RX ADMIN — PREGABALIN 50 MG: 50 CAPSULE ORAL at 16:58

## 2017-09-09 PROBLEM — R10.9 ABDOMINAL PAIN: Status: ACTIVE | Noted: 2017-09-06

## 2017-09-09 LAB
ANION GAP SERPL CALCULATED.3IONS-SCNC: 7 MMOL/L (ref 4–13)
BUN SERPL-MCNC: 7 MG/DL (ref 5–25)
CALCIUM SERPL-MCNC: 8.3 MG/DL (ref 8.3–10.1)
CHLORIDE SERPL-SCNC: 102 MMOL/L (ref 100–108)
CO2 SERPL-SCNC: 28 MMOL/L (ref 21–32)
CREAT SERPL-MCNC: 0.83 MG/DL (ref 0.6–1.3)
ERYTHROCYTE [DISTWIDTH] IN BLOOD BY AUTOMATED COUNT: 12.6 % (ref 11.6–15.1)
GFR SERPL CREATININE-BSD FRML MDRD: 98 ML/MIN/1.73SQ M
GLUCOSE SERPL-MCNC: 170 MG/DL (ref 65–140)
GLUCOSE SERPL-MCNC: 174 MG/DL (ref 65–140)
GLUCOSE SERPL-MCNC: 176 MG/DL (ref 65–140)
GLUCOSE SERPL-MCNC: 214 MG/DL (ref 65–140)
GLUCOSE SERPL-MCNC: 399 MG/DL (ref 65–140)
HCT VFR BLD AUTO: 39.5 % (ref 42–52)
HGB BLD-MCNC: 13.5 G/DL (ref 14–18)
LG PLATELETS BLD QL SMEAR: PRESENT
MAGNESIUM SERPL-MCNC: 1.8 MG/DL (ref 1.6–2.6)
MCH RBC QN AUTO: 31.1 PG (ref 27–31)
MCHC RBC AUTO-ENTMCNC: 34.1 G/DL (ref 31.4–37.4)
MCV RBC AUTO: 91 FL (ref 82–98)
PHOSPHATE SERPL-MCNC: 3.1 MG/DL (ref 2.7–4.5)
PLATELET # BLD AUTO: 148 THOUSANDS/UL (ref 130–400)
PLATELET BLD QL SMEAR: ADEQUATE
PMV BLD AUTO: 8 FL (ref 8.9–12.7)
POTASSIUM SERPL-SCNC: 4.1 MMOL/L (ref 3.5–5.3)
RBC # BLD AUTO: 4.33 MILLION/UL (ref 4.7–6.1)
SODIUM SERPL-SCNC: 137 MMOL/L (ref 136–145)
TOXIC GRANULES BLD QL SMEAR: PRESENT
WBC # BLD AUTO: 5.1 THOUSAND/UL (ref 4.8–10.8)
WBC STL QL MICRO: NORMAL

## 2017-09-09 PROCEDURE — 83993 ASSAY FOR CALPROTECTIN FECAL: CPT | Performed by: INTERNAL MEDICINE

## 2017-09-09 PROCEDURE — 87205 SMEAR GRAM STAIN: CPT | Performed by: INTERNAL MEDICINE

## 2017-09-09 PROCEDURE — 87177 OVA AND PARASITES SMEARS: CPT | Performed by: INTERNAL MEDICINE

## 2017-09-09 PROCEDURE — 87046 STOOL CULTR AEROBIC BACT EA: CPT | Performed by: INTERNAL MEDICINE

## 2017-09-09 PROCEDURE — 87045 FECES CULTURE AEROBIC BACT: CPT | Performed by: INTERNAL MEDICINE

## 2017-09-09 PROCEDURE — 87899 AGENT NOS ASSAY W/OPTIC: CPT | Performed by: INTERNAL MEDICINE

## 2017-09-09 PROCEDURE — 84100 ASSAY OF PHOSPHORUS: CPT | Performed by: STUDENT IN AN ORGANIZED HEALTH CARE EDUCATION/TRAINING PROGRAM

## 2017-09-09 PROCEDURE — 82948 REAGENT STRIP/BLOOD GLUCOSE: CPT

## 2017-09-09 PROCEDURE — 87209 SMEAR COMPLEX STAIN: CPT | Performed by: INTERNAL MEDICINE

## 2017-09-09 PROCEDURE — 94660 CPAP INITIATION&MGMT: CPT

## 2017-09-09 PROCEDURE — 87015 SPECIMEN INFECT AGNT CONCNTJ: CPT | Performed by: INTERNAL MEDICINE

## 2017-09-09 PROCEDURE — 85027 COMPLETE CBC AUTOMATED: CPT | Performed by: STUDENT IN AN ORGANIZED HEALTH CARE EDUCATION/TRAINING PROGRAM

## 2017-09-09 PROCEDURE — 80048 BASIC METABOLIC PNL TOTAL CA: CPT | Performed by: STUDENT IN AN ORGANIZED HEALTH CARE EDUCATION/TRAINING PROGRAM

## 2017-09-09 PROCEDURE — 83735 ASSAY OF MAGNESIUM: CPT | Performed by: STUDENT IN AN ORGANIZED HEALTH CARE EDUCATION/TRAINING PROGRAM

## 2017-09-09 RX ORDER — POTASSIUM CHLORIDE 20 MEQ/1
40 TABLET, EXTENDED RELEASE ORAL ONCE
Status: COMPLETED | OUTPATIENT
Start: 2017-09-09 | End: 2017-09-09

## 2017-09-09 RX ORDER — INSULIN GLARGINE 100 [IU]/ML
30 INJECTION, SOLUTION SUBCUTANEOUS
Status: DISCONTINUED | OUTPATIENT
Start: 2017-09-09 | End: 2017-09-11 | Stop reason: HOSPADM

## 2017-09-09 RX ADMIN — AMLODIPINE BESYLATE 5 MG: 5 TABLET ORAL at 08:41

## 2017-09-09 RX ADMIN — ASPIRIN 81 MG 81 MG: 81 TABLET ORAL at 08:42

## 2017-09-09 RX ADMIN — OXYCODONE HYDROCHLORIDE AND ACETAMINOPHEN 1 TABLET: 5; 325 TABLET ORAL at 15:29

## 2017-09-09 RX ADMIN — VENLAFAXINE 150 MG: 37.5 TABLET ORAL at 08:41

## 2017-09-09 RX ADMIN — INSULIN LISPRO 1 UNITS: 100 INJECTION, SOLUTION INTRAVENOUS; SUBCUTANEOUS at 16:09

## 2017-09-09 RX ADMIN — METOPROLOL TARTRATE 25 MG: 25 TABLET ORAL at 17:46

## 2017-09-09 RX ADMIN — INSULIN LISPRO 16 UNITS: 100 INJECTION, SOLUTION INTRAVENOUS; SUBCUTANEOUS at 16:09

## 2017-09-09 RX ADMIN — INSULIN LISPRO 1 UNITS: 100 INJECTION, SOLUTION INTRAVENOUS; SUBCUTANEOUS at 08:39

## 2017-09-09 RX ADMIN — PANTOPRAZOLE SODIUM 20 MG: 20 TABLET, DELAYED RELEASE ORAL at 05:08

## 2017-09-09 RX ADMIN — FLUTICASONE PROPIONATE AND SALMETEROL 1 PUFF: 50; 250 POWDER RESPIRATORY (INHALATION) at 08:41

## 2017-09-09 RX ADMIN — INSULIN GLARGINE 30 UNITS: 100 INJECTION, SOLUTION SUBCUTANEOUS at 21:29

## 2017-09-09 RX ADMIN — INSULIN LISPRO 1 UNITS: 100 INJECTION, SOLUTION INTRAVENOUS; SUBCUTANEOUS at 01:35

## 2017-09-09 RX ADMIN — ENOXAPARIN SODIUM 40 MG: 40 INJECTION SUBCUTANEOUS at 08:42

## 2017-09-09 RX ADMIN — PREGABALIN 50 MG: 50 CAPSULE ORAL at 21:22

## 2017-09-09 RX ADMIN — FLUTICASONE PROPIONATE AND SALMETEROL 1 PUFF: 50; 250 POWDER RESPIRATORY (INHALATION) at 21:23

## 2017-09-09 RX ADMIN — SODIUM CHLORIDE 100 ML/HR: 0.9 INJECTION, SOLUTION INTRAVENOUS at 05:08

## 2017-09-09 RX ADMIN — HYDROXYZINE HYDROCHLORIDE 50 MG: 25 TABLET, FILM COATED ORAL at 21:22

## 2017-09-09 RX ADMIN — PREGABALIN 50 MG: 50 CAPSULE ORAL at 08:41

## 2017-09-09 RX ADMIN — OXYCODONE HYDROCHLORIDE AND ACETAMINOPHEN 1 TABLET: 5; 325 TABLET ORAL at 21:22

## 2017-09-09 RX ADMIN — OXYCODONE HYDROCHLORIDE AND ACETAMINOPHEN 1 TABLET: 5; 325 TABLET ORAL at 08:47

## 2017-09-09 RX ADMIN — FENOFIBRATE 168 MG: 48 TABLET ORAL at 08:40

## 2017-09-09 RX ADMIN — AMLODIPINE BESYLATE 5 MG: 5 TABLET ORAL at 17:46

## 2017-09-09 RX ADMIN — METOPROLOL TARTRATE 25 MG: 25 TABLET ORAL at 08:41

## 2017-09-09 RX ADMIN — POTASSIUM CHLORIDE 40 MEQ: 1500 TABLET, EXTENDED RELEASE ORAL at 11:18

## 2017-09-09 RX ADMIN — PREGABALIN 50 MG: 50 CAPSULE ORAL at 15:29

## 2017-09-09 RX ADMIN — QUETIAPINE 800 MG: 300 TABLET, EXTENDED RELEASE ORAL at 21:21

## 2017-09-09 RX ADMIN — ALPRAZOLAM 2 MG: 0.5 TABLET ORAL at 21:29

## 2017-09-09 RX ADMIN — INSULIN LISPRO 4 UNITS: 100 INJECTION, SOLUTION INTRAVENOUS; SUBCUTANEOUS at 12:30

## 2017-09-09 RX ADMIN — LISINOPRIL 20 MG: 20 TABLET ORAL at 08:41

## 2017-09-09 RX ADMIN — PRAVASTATIN SODIUM 40 MG: 40 TABLET ORAL at 15:30

## 2017-09-10 LAB
ANION GAP SERPL CALCULATED.3IONS-SCNC: 8 MMOL/L (ref 4–13)
BACTERIA STL CULT: NORMAL
BACTERIA STL CULT: NORMAL
BUN SERPL-MCNC: 10 MG/DL (ref 5–25)
CALCIUM SERPL-MCNC: 8.9 MG/DL (ref 8.3–10.1)
CHLORIDE SERPL-SCNC: 101 MMOL/L (ref 100–108)
CO2 SERPL-SCNC: 28 MMOL/L (ref 21–32)
CREAT SERPL-MCNC: 1.07 MG/DL (ref 0.6–1.3)
ERYTHROCYTE [DISTWIDTH] IN BLOOD BY AUTOMATED COUNT: 13.3 % (ref 11.6–15.1)
GFR SERPL CREATININE-BSD FRML MDRD: 77 ML/MIN/1.73SQ M
GLUCOSE SERPL-MCNC: 108 MG/DL (ref 65–140)
GLUCOSE SERPL-MCNC: 109 MG/DL (ref 65–140)
GLUCOSE SERPL-MCNC: 160 MG/DL (ref 65–140)
GLUCOSE SERPL-MCNC: 185 MG/DL (ref 65–140)
GLUCOSE SERPL-MCNC: 208 MG/DL (ref 65–140)
HCT VFR BLD AUTO: 40.2 % (ref 42–52)
HGB BLD-MCNC: 13.9 G/DL (ref 14–18)
MCH RBC QN AUTO: 31.7 PG (ref 27–31)
MCHC RBC AUTO-ENTMCNC: 34.6 G/DL (ref 31.4–37.4)
MCV RBC AUTO: 92 FL (ref 82–98)
PLATELET # BLD AUTO: 151 THOUSANDS/UL (ref 130–400)
PMV BLD AUTO: 7.3 FL (ref 8.9–12.7)
POTASSIUM SERPL-SCNC: 4 MMOL/L (ref 3.5–5.3)
RBC # BLD AUTO: 4.39 MILLION/UL (ref 4.7–6.1)
SODIUM SERPL-SCNC: 137 MMOL/L (ref 136–145)
WBC # BLD AUTO: 4.7 THOUSAND/UL (ref 4.8–10.8)

## 2017-09-10 PROCEDURE — 94660 CPAP INITIATION&MGMT: CPT

## 2017-09-10 PROCEDURE — 85027 COMPLETE CBC AUTOMATED: CPT | Performed by: STUDENT IN AN ORGANIZED HEALTH CARE EDUCATION/TRAINING PROGRAM

## 2017-09-10 PROCEDURE — 82948 REAGENT STRIP/BLOOD GLUCOSE: CPT

## 2017-09-10 PROCEDURE — 80048 BASIC METABOLIC PNL TOTAL CA: CPT | Performed by: STUDENT IN AN ORGANIZED HEALTH CARE EDUCATION/TRAINING PROGRAM

## 2017-09-10 PROCEDURE — 94760 N-INVAS EAR/PLS OXIMETRY 1: CPT

## 2017-09-10 RX ADMIN — ENOXAPARIN SODIUM 40 MG: 40 INJECTION SUBCUTANEOUS at 09:12

## 2017-09-10 RX ADMIN — INSULIN LISPRO 14 UNITS: 100 INJECTION, SOLUTION INTRAVENOUS; SUBCUTANEOUS at 11:49

## 2017-09-10 RX ADMIN — FENOFIBRATE 168 MG: 48 TABLET ORAL at 09:11

## 2017-09-10 RX ADMIN — INSULIN LISPRO 1 UNITS: 100 INJECTION, SOLUTION INTRAVENOUS; SUBCUTANEOUS at 11:49

## 2017-09-10 RX ADMIN — PREGABALIN 50 MG: 50 CAPSULE ORAL at 09:12

## 2017-09-10 RX ADMIN — INSULIN LISPRO 16 UNITS: 100 INJECTION, SOLUTION INTRAVENOUS; SUBCUTANEOUS at 17:33

## 2017-09-10 RX ADMIN — ASPIRIN 81 MG 81 MG: 81 TABLET ORAL at 09:12

## 2017-09-10 RX ADMIN — LISINOPRIL 20 MG: 20 TABLET ORAL at 09:12

## 2017-09-10 RX ADMIN — INSULIN LISPRO 1 UNITS: 100 INJECTION, SOLUTION INTRAVENOUS; SUBCUTANEOUS at 09:10

## 2017-09-10 RX ADMIN — PANTOPRAZOLE SODIUM 20 MG: 20 TABLET, DELAYED RELEASE ORAL at 09:16

## 2017-09-10 RX ADMIN — METOPROLOL TARTRATE 25 MG: 25 TABLET ORAL at 09:12

## 2017-09-10 RX ADMIN — ALPRAZOLAM 2 MG: 0.5 TABLET ORAL at 21:16

## 2017-09-10 RX ADMIN — VENLAFAXINE 150 MG: 37.5 TABLET ORAL at 09:12

## 2017-09-10 RX ADMIN — METOPROLOL TARTRATE 25 MG: 25 TABLET ORAL at 17:34

## 2017-09-10 RX ADMIN — INSULIN LISPRO 14 UNITS: 100 INJECTION, SOLUTION INTRAVENOUS; SUBCUTANEOUS at 09:11

## 2017-09-10 RX ADMIN — OXYCODONE HYDROCHLORIDE AND ACETAMINOPHEN 1 TABLET: 5; 325 TABLET ORAL at 09:17

## 2017-09-10 RX ADMIN — HYDROXYZINE HYDROCHLORIDE 50 MG: 25 TABLET, FILM COATED ORAL at 21:15

## 2017-09-10 RX ADMIN — PREGABALIN 50 MG: 50 CAPSULE ORAL at 21:15

## 2017-09-10 RX ADMIN — QUETIAPINE 800 MG: 300 TABLET, EXTENDED RELEASE ORAL at 21:15

## 2017-09-10 RX ADMIN — PRAVASTATIN SODIUM 40 MG: 40 TABLET ORAL at 17:33

## 2017-09-10 RX ADMIN — FLUTICASONE PROPIONATE AND SALMETEROL 1 PUFF: 50; 250 POWDER RESPIRATORY (INHALATION) at 21:16

## 2017-09-10 RX ADMIN — OXYCODONE HYDROCHLORIDE AND ACETAMINOPHEN 1 TABLET: 5; 325 TABLET ORAL at 15:04

## 2017-09-10 RX ADMIN — AMLODIPINE BESYLATE 5 MG: 5 TABLET ORAL at 17:34

## 2017-09-10 RX ADMIN — OXYCODONE HYDROCHLORIDE AND ACETAMINOPHEN 1 TABLET: 5; 325 TABLET ORAL at 20:04

## 2017-09-10 RX ADMIN — FLUTICASONE PROPIONATE AND SALMETEROL 1 PUFF: 50; 250 POWDER RESPIRATORY (INHALATION) at 09:09

## 2017-09-10 RX ADMIN — PREGABALIN 50 MG: 50 CAPSULE ORAL at 15:04

## 2017-09-10 RX ADMIN — AMLODIPINE BESYLATE 5 MG: 5 TABLET ORAL at 09:12

## 2017-09-10 RX ADMIN — POLYETHYLENE GLYCOL 3350, SODIUM SULFATE ANHYDROUS, SODIUM BICARBONATE, SODIUM CHLORIDE, POTASSIUM CHLORIDE 4000 ML: 236; 22.74; 6.74; 5.86; 2.97 POWDER, FOR SOLUTION ORAL at 15:04

## 2017-09-11 ENCOUNTER — ANESTHESIA (INPATIENT)
Dept: GASTROENTEROLOGY | Facility: AMBULARY SURGERY CENTER | Age: 57
DRG: 392 | End: 2017-09-11
Payer: MEDICARE

## 2017-09-11 ENCOUNTER — GENERIC CONVERSION - ENCOUNTER (OUTPATIENT)
Dept: OTHER | Facility: OTHER | Age: 57
End: 2017-09-11

## 2017-09-11 VITALS
SYSTOLIC BLOOD PRESSURE: 157 MMHG | BODY MASS INDEX: 37.8 KG/M2 | OXYGEN SATURATION: 95 % | TEMPERATURE: 98.2 F | HEART RATE: 64 BPM | DIASTOLIC BLOOD PRESSURE: 86 MMHG | HEIGHT: 71 IN | WEIGHT: 270 LBS | RESPIRATION RATE: 18 BRPM

## 2017-09-11 PROBLEM — R19.7 DIARRHEA IN ADULT PATIENT: Status: ACTIVE | Noted: 2017-09-06

## 2017-09-11 PROBLEM — R10.9 ABDOMINAL PAIN: Status: ACTIVE | Noted: 2017-09-06

## 2017-09-11 PROBLEM — R19.7 DIARRHEA IN ADULT PATIENT: Status: RESOLVED | Noted: 2017-09-06 | Resolved: 2017-09-11

## 2017-09-11 LAB
ANION GAP SERPL CALCULATED.3IONS-SCNC: 10 MMOL/L (ref 4–13)
BACTERIA BLD CULT: NORMAL
BACTERIA BLD CULT: NORMAL
BACTERIA STL CULT: NORMAL
BACTERIA STL CULT: NORMAL
BUN SERPL-MCNC: 8 MG/DL (ref 5–25)
CALCIUM SERPL-MCNC: 8.9 MG/DL (ref 8.3–10.1)
CHLORIDE SERPL-SCNC: 99 MMOL/L (ref 100–108)
CO2 SERPL-SCNC: 28 MMOL/L (ref 21–32)
CREAT SERPL-MCNC: 0.95 MG/DL (ref 0.6–1.3)
ERYTHROCYTE [DISTWIDTH] IN BLOOD BY AUTOMATED COUNT: 12.6 % (ref 11.6–15.1)
GFR SERPL CREATININE-BSD FRML MDRD: 88 ML/MIN/1.73SQ M
GLUCOSE SERPL-MCNC: 156 MG/DL (ref 65–140)
GLUCOSE SERPL-MCNC: 157 MG/DL (ref 65–140)
GLUCOSE SERPL-MCNC: 339 MG/DL (ref 65–140)
HCT VFR BLD AUTO: 41.8 % (ref 42–52)
HGB BLD-MCNC: 14 G/DL (ref 14–18)
MCH RBC QN AUTO: 30.7 PG (ref 27–31)
MCHC RBC AUTO-ENTMCNC: 33.5 G/DL (ref 31.4–37.4)
MCV RBC AUTO: 91 FL (ref 82–98)
PLATELET # BLD AUTO: 169 THOUSANDS/UL (ref 130–400)
PMV BLD AUTO: 7.9 FL (ref 8.9–12.7)
POTASSIUM SERPL-SCNC: 3.8 MMOL/L (ref 3.5–5.3)
RBC # BLD AUTO: 4.58 MILLION/UL (ref 4.7–6.1)
SODIUM SERPL-SCNC: 137 MMOL/L (ref 136–145)
WBC # BLD AUTO: 5.7 THOUSAND/UL (ref 4.8–10.8)

## 2017-09-11 PROCEDURE — 80048 BASIC METABOLIC PNL TOTAL CA: CPT | Performed by: STUDENT IN AN ORGANIZED HEALTH CARE EDUCATION/TRAINING PROGRAM

## 2017-09-11 PROCEDURE — 94760 N-INVAS EAR/PLS OXIMETRY 1: CPT

## 2017-09-11 PROCEDURE — 0DBN8ZX EXCISION OF SIGMOID COLON, VIA NATURAL OR ARTIFICIAL OPENING ENDOSCOPIC, DIAGNOSTIC: ICD-10-PCS | Performed by: STUDENT IN AN ORGANIZED HEALTH CARE EDUCATION/TRAINING PROGRAM

## 2017-09-11 PROCEDURE — 88305 TISSUE EXAM BY PATHOLOGIST: CPT | Performed by: INTERNAL MEDICINE

## 2017-09-11 PROCEDURE — 85027 COMPLETE CBC AUTOMATED: CPT | Performed by: STUDENT IN AN ORGANIZED HEALTH CARE EDUCATION/TRAINING PROGRAM

## 2017-09-11 PROCEDURE — 82948 REAGENT STRIP/BLOOD GLUCOSE: CPT

## 2017-09-11 RX ORDER — DICYCLOMINE HYDROCHLORIDE 10 MG/1
10 CAPSULE ORAL
Qty: 120 CAPSULE | Refills: 0 | Status: SHIPPED | OUTPATIENT
Start: 2017-09-11 | End: 2017-11-06

## 2017-09-11 RX ORDER — SODIUM CHLORIDE, SODIUM LACTATE, POTASSIUM CHLORIDE, CALCIUM CHLORIDE 600; 310; 30; 20 MG/100ML; MG/100ML; MG/100ML; MG/100ML
INJECTION, SOLUTION INTRAVENOUS CONTINUOUS PRN
Status: DISCONTINUED | OUTPATIENT
Start: 2017-09-11 | End: 2017-09-11 | Stop reason: SURG

## 2017-09-11 RX ORDER — IPRATROPIUM BROMIDE AND ALBUTEROL SULFATE 2.5; .5 MG/3ML; MG/3ML
3 SOLUTION RESPIRATORY (INHALATION) ONCE AS NEEDED
Status: COMPLETED | OUTPATIENT
Start: 2017-09-11 | End: 2017-09-11

## 2017-09-11 RX ORDER — PROPOFOL 10 MG/ML
INJECTION, EMULSION INTRAVENOUS AS NEEDED
Status: DISCONTINUED | OUTPATIENT
Start: 2017-09-11 | End: 2017-09-11 | Stop reason: SURG

## 2017-09-11 RX ORDER — SODIUM CHLORIDE, SODIUM LACTATE, POTASSIUM CHLORIDE, CALCIUM CHLORIDE 600; 310; 30; 20 MG/100ML; MG/100ML; MG/100ML; MG/100ML
75 INJECTION, SOLUTION INTRAVENOUS CONTINUOUS
Status: DISCONTINUED | OUTPATIENT
Start: 2017-09-11 | End: 2017-09-11 | Stop reason: HOSPADM

## 2017-09-11 RX ORDER — OXYCODONE HYDROCHLORIDE AND ACETAMINOPHEN 5; 325 MG/1; MG/1
1 TABLET ORAL EVERY 6 HOURS PRN
Qty: 8 TABLET | Refills: 0 | Status: SHIPPED | OUTPATIENT
Start: 2017-09-11 | End: 2017-09-16

## 2017-09-11 RX ADMIN — VENLAFAXINE 150 MG: 37.5 TABLET ORAL at 09:09

## 2017-09-11 RX ADMIN — IPRATROPIUM BROMIDE AND ALBUTEROL SULFATE 3 ML: .5; 3 SOLUTION RESPIRATORY (INHALATION) at 13:02

## 2017-09-11 RX ADMIN — OXYCODONE HYDROCHLORIDE AND ACETAMINOPHEN 1 TABLET: 5; 325 TABLET ORAL at 06:18

## 2017-09-11 RX ADMIN — PRAVASTATIN SODIUM 40 MG: 40 TABLET ORAL at 16:27

## 2017-09-11 RX ADMIN — PROPOFOL 60 MG: 10 INJECTION, EMULSION INTRAVENOUS at 12:35

## 2017-09-11 RX ADMIN — PROPOFOL 20 MG: 10 INJECTION, EMULSION INTRAVENOUS at 12:46

## 2017-09-11 RX ADMIN — LISINOPRIL 20 MG: 20 TABLET ORAL at 09:09

## 2017-09-11 RX ADMIN — AMLODIPINE BESYLATE 5 MG: 5 TABLET ORAL at 17:02

## 2017-09-11 RX ADMIN — PREGABALIN 50 MG: 50 CAPSULE ORAL at 16:27

## 2017-09-11 RX ADMIN — PROPOFOL 100 MG: 10 INJECTION, EMULSION INTRAVENOUS at 12:31

## 2017-09-11 RX ADMIN — METOPROLOL TARTRATE 25 MG: 25 TABLET ORAL at 17:02

## 2017-09-11 RX ADMIN — SODIUM CHLORIDE, SODIUM LACTATE, POTASSIUM CHLORIDE, AND CALCIUM CHLORIDE: .6; .31; .03; .02 INJECTION, SOLUTION INTRAVENOUS at 12:25

## 2017-09-11 RX ADMIN — PREGABALIN 50 MG: 50 CAPSULE ORAL at 09:10

## 2017-09-11 RX ADMIN — FENOFIBRATE 168 MG: 48 TABLET ORAL at 09:08

## 2017-09-11 RX ADMIN — INSULIN LISPRO 16 UNITS: 100 INJECTION, SOLUTION INTRAVENOUS; SUBCUTANEOUS at 16:25

## 2017-09-11 RX ADMIN — PROPOFOL 20 MG: 10 INJECTION, EMULSION INTRAVENOUS at 12:44

## 2017-09-11 RX ADMIN — AMLODIPINE BESYLATE 5 MG: 5 TABLET ORAL at 09:09

## 2017-09-11 RX ADMIN — FLUTICASONE PROPIONATE AND SALMETEROL 1 PUFF: 50; 250 POWDER RESPIRATORY (INHALATION) at 09:10

## 2017-09-11 RX ADMIN — INSULIN LISPRO 3 UNITS: 100 INJECTION, SOLUTION INTRAVENOUS; SUBCUTANEOUS at 16:25

## 2017-09-11 RX ADMIN — METOPROLOL TARTRATE 25 MG: 25 TABLET ORAL at 09:09

## 2017-09-11 RX ADMIN — PROPOFOL 20 MG: 10 INJECTION, EMULSION INTRAVENOUS at 12:40

## 2017-09-13 LAB — O+P STL CONC: NORMAL

## 2017-09-14 LAB — CALPROTECTIN STL-MCNT: <16 UG/G (ref 0–120)

## 2017-09-19 ENCOUNTER — ANESTHESIA EVENT (OUTPATIENT)
Dept: GASTROENTEROLOGY | Facility: AMBULARY SURGERY CENTER | Age: 57
End: 2017-09-19
Payer: MEDICARE

## 2017-09-20 ENCOUNTER — GENERIC CONVERSION - ENCOUNTER (OUTPATIENT)
Dept: OTHER | Facility: OTHER | Age: 57
End: 2017-09-20

## 2017-09-20 ENCOUNTER — HOSPITAL ENCOUNTER (OUTPATIENT)
Facility: AMBULARY SURGERY CENTER | Age: 57
Setting detail: OUTPATIENT SURGERY
Discharge: HOME | End: 2017-09-20
Attending: INTERNAL MEDICINE | Admitting: INTERNAL MEDICINE
Payer: MEDICARE

## 2017-09-20 ENCOUNTER — ANESTHESIA (OUTPATIENT)
Dept: GASTROENTEROLOGY | Facility: AMBULARY SURGERY CENTER | Age: 57
End: 2017-09-20
Payer: MEDICARE

## 2017-09-20 VITALS
HEART RATE: 77 BPM | SYSTOLIC BLOOD PRESSURE: 165 MMHG | OXYGEN SATURATION: 95 % | DIASTOLIC BLOOD PRESSURE: 80 MMHG | RESPIRATION RATE: 18 BRPM | TEMPERATURE: 99.5 F

## 2017-09-20 DIAGNOSIS — R10.84 GENERALIZED ABDOMINAL PAIN: ICD-10-CM

## 2017-09-20 DIAGNOSIS — K21.9 GASTRO-ESOPHAGEAL REFLUX DISEASE WITHOUT ESOPHAGITIS: ICD-10-CM

## 2017-09-20 DIAGNOSIS — K31.84 GASTROPARESIS: ICD-10-CM

## 2017-09-20 LAB — GLUCOSE SERPL-MCNC: 221 MG/DL (ref 65–140)

## 2017-09-20 PROCEDURE — 82948 REAGENT STRIP/BLOOD GLUCOSE: CPT

## 2017-09-20 PROCEDURE — 88305 TISSUE EXAM BY PATHOLOGIST: CPT | Performed by: INTERNAL MEDICINE

## 2017-09-20 PROCEDURE — 88342 IMHCHEM/IMCYTCHM 1ST ANTB: CPT | Performed by: INTERNAL MEDICINE

## 2017-09-20 RX ORDER — ONDANSETRON 2 MG/ML
4 INJECTION INTRAMUSCULAR; INTRAVENOUS ONCE AS NEEDED
Status: CANCELLED | OUTPATIENT
Start: 2017-09-20

## 2017-09-20 RX ORDER — LIDOCAINE HYDROCHLORIDE 10 MG/ML
INJECTION, SOLUTION INFILTRATION; PERINEURAL AS NEEDED
Status: DISCONTINUED | OUTPATIENT
Start: 2017-09-20 | End: 2017-09-20 | Stop reason: SURG

## 2017-09-20 RX ORDER — SODIUM CHLORIDE, SODIUM LACTATE, POTASSIUM CHLORIDE, CALCIUM CHLORIDE 600; 310; 30; 20 MG/100ML; MG/100ML; MG/100ML; MG/100ML
125 INJECTION, SOLUTION INTRAVENOUS CONTINUOUS
Status: DISCONTINUED | OUTPATIENT
Start: 2017-09-20 | End: 2017-09-20 | Stop reason: HOSPADM

## 2017-09-20 RX ORDER — PROPOFOL 10 MG/ML
INJECTION, EMULSION INTRAVENOUS AS NEEDED
Status: DISCONTINUED | OUTPATIENT
Start: 2017-09-20 | End: 2017-09-20 | Stop reason: SURG

## 2017-09-20 RX ORDER — SODIUM CHLORIDE, SODIUM LACTATE, POTASSIUM CHLORIDE, CALCIUM CHLORIDE 600; 310; 30; 20 MG/100ML; MG/100ML; MG/100ML; MG/100ML
INJECTION, SOLUTION INTRAVENOUS CONTINUOUS PRN
Status: DISCONTINUED | OUTPATIENT
Start: 2017-09-20 | End: 2017-09-20 | Stop reason: SURG

## 2017-09-20 RX ORDER — LISINOPRIL 20 MG/1
20 TABLET ORAL DAILY
COMMUNITY
End: 2018-08-29 | Stop reason: SDUPTHER

## 2017-09-20 RX ADMIN — PROPOFOL 120 MG: 10 INJECTION, EMULSION INTRAVENOUS at 10:48

## 2017-09-20 RX ADMIN — PROPOFOL 50 MG: 10 INJECTION, EMULSION INTRAVENOUS at 10:51

## 2017-09-20 RX ADMIN — SODIUM CHLORIDE, SODIUM LACTATE, POTASSIUM CHLORIDE, AND CALCIUM CHLORIDE: .6; .31; .03; .02 INJECTION, SOLUTION INTRAVENOUS at 10:28

## 2017-09-20 RX ADMIN — LIDOCAINE HYDROCHLORIDE 50 MG: 10 INJECTION, SOLUTION INFILTRATION; PERINEURAL at 10:48

## 2017-09-25 ENCOUNTER — ALLSCRIPTS OFFICE VISIT (OUTPATIENT)
Dept: OTHER | Facility: OTHER | Age: 57
End: 2017-09-25

## 2017-10-16 ENCOUNTER — ALLSCRIPTS OFFICE VISIT (OUTPATIENT)
Dept: OTHER | Facility: OTHER | Age: 57
End: 2017-10-16

## 2017-10-16 LAB
BILIRUB UR QL STRIP: NORMAL
CLARITY UR: NORMAL
COLOR UR: CLEAR
GLUCOSE (HISTORICAL): NORMAL
HGB UR QL STRIP.AUTO: NORMAL
KETONES UR STRIP-MCNC: NORMAL MG/DL
LEUKOCYTE ESTERASE UR QL STRIP: NORMAL
NITRITE UR QL STRIP: NORMAL
PH UR STRIP.AUTO: 5 [PH]
PROT UR STRIP-MCNC: NORMAL MG/DL
SP GR UR STRIP.AUTO: 1.01
UROBILINOGEN UR QL STRIP.AUTO: NORMAL

## 2017-11-06 ENCOUNTER — APPOINTMENT (EMERGENCY)
Dept: RADIOLOGY | Facility: HOSPITAL | Age: 57
DRG: 191 | End: 2017-11-06
Payer: MEDICARE

## 2017-11-06 ENCOUNTER — HOSPITAL ENCOUNTER (INPATIENT)
Facility: HOSPITAL | Age: 57
LOS: 4 days | Discharge: HOME | DRG: 191 | End: 2017-11-10
Attending: EMERGENCY MEDICINE | Admitting: FAMILY MEDICINE
Payer: MEDICARE

## 2017-11-06 DIAGNOSIS — J44.1 COPD EXACERBATION (HCC): Primary | ICD-10-CM

## 2017-11-06 DIAGNOSIS — R07.9 CHEST PAIN: ICD-10-CM

## 2017-11-06 LAB
ANION GAP SERPL CALCULATED.3IONS-SCNC: 11 MMOL/L (ref 4–13)
BASOPHILS # BLD AUTO: 0 THOUSANDS/ΜL (ref 0–0.1)
BASOPHILS NFR BLD AUTO: 0 % (ref 0–1)
BUN SERPL-MCNC: 27 MG/DL (ref 5–25)
CALCIUM SERPL-MCNC: 9.1 MG/DL (ref 8.3–10.1)
CHLORIDE SERPL-SCNC: 96 MMOL/L (ref 100–108)
CO2 SERPL-SCNC: 27 MMOL/L (ref 21–32)
CREAT SERPL-MCNC: 1.03 MG/DL (ref 0.6–1.3)
EOSINOPHIL # BLD AUTO: 0.1 THOUSAND/ΜL (ref 0–0.61)
EOSINOPHIL NFR BLD AUTO: 1 % (ref 0–6)
ERYTHROCYTE [DISTWIDTH] IN BLOOD BY AUTOMATED COUNT: 13.6 % (ref 11.6–15.1)
GFR SERPL CREATININE-BSD FRML MDRD: 80 ML/MIN/1.73SQ M
GLUCOSE SERPL-MCNC: 272 MG/DL (ref 65–140)
GLUCOSE SERPL-MCNC: 280 MG/DL (ref 65–140)
GLUCOSE SERPL-MCNC: 329 MG/DL (ref 65–140)
GLUCOSE SERPL-MCNC: 461 MG/DL (ref 65–140)
HCT VFR BLD AUTO: 41.9 % (ref 42–52)
HGB BLD-MCNC: 14.3 G/DL (ref 14–18)
LYMPHOCYTES # BLD AUTO: 2.3 THOUSANDS/ΜL (ref 0.6–4.47)
LYMPHOCYTES NFR BLD AUTO: 35 % (ref 14–44)
MAGNESIUM SERPL-MCNC: 2 MG/DL (ref 1.6–2.6)
MCH RBC QN AUTO: 31.2 PG (ref 27–31)
MCHC RBC AUTO-ENTMCNC: 34.1 G/DL (ref 31.4–37.4)
MCV RBC AUTO: 91 FL (ref 82–98)
MONOCYTES # BLD AUTO: 0.5 THOUSAND/ΜL (ref 0.17–1.22)
MONOCYTES NFR BLD AUTO: 7 % (ref 4–12)
NEUTROPHILS # BLD AUTO: 3.7 THOUSANDS/ΜL (ref 1.85–7.62)
NEUTS SEG NFR BLD AUTO: 57 % (ref 43–75)
NRBC BLD AUTO-RTO: 0 /100 WBCS
PLATELET # BLD AUTO: 179 THOUSANDS/UL (ref 130–400)
PMV BLD AUTO: 7.6 FL (ref 8.9–12.7)
POTASSIUM SERPL-SCNC: 4.5 MMOL/L (ref 3.5–5.3)
RBC # BLD AUTO: 4.59 MILLION/UL (ref 4.7–6.1)
SODIUM SERPL-SCNC: 134 MMOL/L (ref 136–145)
TROPONIN I SERPL-MCNC: 0.02 NG/ML
TROPONIN I SERPL-MCNC: <0.02 NG/ML
WBC # BLD AUTO: 6.5 THOUSAND/UL (ref 4.8–10.8)

## 2017-11-06 PROCEDURE — 93005 ELECTROCARDIOGRAM TRACING: CPT

## 2017-11-06 PROCEDURE — 87798 DETECT AGENT NOS DNA AMP: CPT | Performed by: FAMILY MEDICINE

## 2017-11-06 PROCEDURE — 94760 N-INVAS EAR/PLS OXIMETRY 1: CPT

## 2017-11-06 PROCEDURE — 85025 COMPLETE CBC W/AUTO DIFF WBC: CPT | Performed by: EMERGENCY MEDICINE

## 2017-11-06 PROCEDURE — 71020 HB CHEST X-RAY 2VW FRONTAL&LATL: CPT

## 2017-11-06 PROCEDURE — 84484 ASSAY OF TROPONIN QUANT: CPT | Performed by: EMERGENCY MEDICINE

## 2017-11-06 PROCEDURE — 93005 ELECTROCARDIOGRAM TRACING: CPT | Performed by: FAMILY MEDICINE

## 2017-11-06 PROCEDURE — 94640 AIRWAY INHALATION TREATMENT: CPT

## 2017-11-06 PROCEDURE — 80048 BASIC METABOLIC PNL TOTAL CA: CPT | Performed by: EMERGENCY MEDICINE

## 2017-11-06 PROCEDURE — 96375 TX/PRO/DX INJ NEW DRUG ADDON: CPT

## 2017-11-06 PROCEDURE — 82948 REAGENT STRIP/BLOOD GLUCOSE: CPT

## 2017-11-06 PROCEDURE — 83735 ASSAY OF MAGNESIUM: CPT | Performed by: EMERGENCY MEDICINE

## 2017-11-06 PROCEDURE — 99285 EMERGENCY DEPT VISIT HI MDM: CPT

## 2017-11-06 PROCEDURE — 84484 ASSAY OF TROPONIN QUANT: CPT | Performed by: FAMILY MEDICINE

## 2017-11-06 PROCEDURE — 87449 NOS EACH ORGANISM AG IA: CPT | Performed by: FAMILY MEDICINE

## 2017-11-06 PROCEDURE — 36415 COLL VENOUS BLD VENIPUNCTURE: CPT | Performed by: EMERGENCY MEDICINE

## 2017-11-06 PROCEDURE — 96374 THER/PROPH/DIAG INJ IV PUSH: CPT

## 2017-11-06 PROCEDURE — 87081 CULTURE SCREEN ONLY: CPT | Performed by: FAMILY MEDICINE

## 2017-11-06 RX ORDER — PRAVASTATIN SODIUM 40 MG
40 TABLET ORAL
Status: DISCONTINUED | OUTPATIENT
Start: 2017-11-06 | End: 2017-11-10 | Stop reason: HOSPADM

## 2017-11-06 RX ORDER — FENOFIBRATE 48 MG/1
160 TABLET, COATED ORAL DAILY
Status: DISCONTINUED | OUTPATIENT
Start: 2017-11-06 | End: 2017-11-10 | Stop reason: HOSPADM

## 2017-11-06 RX ORDER — METHYLPREDNISOLONE SODIUM SUCCINATE 125 MG/2ML
60 INJECTION, POWDER, LYOPHILIZED, FOR SOLUTION INTRAMUSCULAR; INTRAVENOUS ONCE
Status: COMPLETED | OUTPATIENT
Start: 2017-11-06 | End: 2017-11-06

## 2017-11-06 RX ORDER — IPRATROPIUM BROMIDE AND ALBUTEROL SULFATE 2.5; .5 MG/3ML; MG/3ML
3 SOLUTION RESPIRATORY (INHALATION)
Status: DISCONTINUED | OUTPATIENT
Start: 2017-11-06 | End: 2017-11-10 | Stop reason: HOSPADM

## 2017-11-06 RX ORDER — ASPIRIN 81 MG/1
81 TABLET, CHEWABLE ORAL DAILY
Status: DISCONTINUED | OUTPATIENT
Start: 2017-11-06 | End: 2017-11-10 | Stop reason: HOSPADM

## 2017-11-06 RX ORDER — INSULIN GLARGINE 100 [IU]/ML
44 INJECTION, SOLUTION SUBCUTANEOUS
Status: DISCONTINUED | OUTPATIENT
Start: 2017-11-06 | End: 2017-11-07

## 2017-11-06 RX ORDER — ONDANSETRON 2 MG/ML
4 INJECTION INTRAMUSCULAR; INTRAVENOUS EVERY 6 HOURS PRN
Status: DISCONTINUED | OUTPATIENT
Start: 2017-11-06 | End: 2017-11-10 | Stop reason: HOSPADM

## 2017-11-06 RX ORDER — LISINOPRIL 20 MG/1
20 TABLET ORAL DAILY
Status: DISCONTINUED | OUTPATIENT
Start: 2017-11-06 | End: 2017-11-10 | Stop reason: HOSPADM

## 2017-11-06 RX ORDER — VENLAFAXINE 37.5 MG/1
150 TABLET ORAL EVERY MORNING
Status: DISCONTINUED | OUTPATIENT
Start: 2017-11-07 | End: 2017-11-06

## 2017-11-06 RX ORDER — HYDROXYZINE HYDROCHLORIDE 10 MG/1
10 TABLET, FILM COATED ORAL
Status: DISCONTINUED | OUTPATIENT
Start: 2017-11-06 | End: 2017-11-06

## 2017-11-06 RX ORDER — MORPHINE SULFATE 4 MG/ML
4 INJECTION, SOLUTION INTRAMUSCULAR; INTRAVENOUS ONCE
Status: COMPLETED | OUTPATIENT
Start: 2017-11-06 | End: 2017-11-06

## 2017-11-06 RX ORDER — MIRTAZAPINE 15 MG/1
30 TABLET, FILM COATED ORAL
Status: DISCONTINUED | OUTPATIENT
Start: 2017-11-06 | End: 2017-11-10 | Stop reason: HOSPADM

## 2017-11-06 RX ORDER — IBUPROFEN 600 MG/1
600 TABLET ORAL EVERY 6 HOURS PRN
Qty: 30 TABLET | Refills: 0 | Status: SHIPPED | OUTPATIENT
Start: 2017-11-06 | End: 2017-11-06

## 2017-11-06 RX ORDER — METHYLPREDNISOLONE SODIUM SUCCINATE 40 MG/ML
40 INJECTION, POWDER, LYOPHILIZED, FOR SOLUTION INTRAMUSCULAR; INTRAVENOUS EVERY 8 HOURS SCHEDULED
Status: DISCONTINUED | OUTPATIENT
Start: 2017-11-06 | End: 2017-11-09

## 2017-11-06 RX ORDER — MIRTAZAPINE 30 MG/1
30 TABLET, FILM COATED ORAL
COMMUNITY
End: 2018-02-21 | Stop reason: DRUGHIGH

## 2017-11-06 RX ORDER — VENLAFAXINE HYDROCHLORIDE 150 MG/1
150 CAPSULE, EXTENDED RELEASE ORAL DAILY
Status: DISCONTINUED | OUTPATIENT
Start: 2017-11-07 | End: 2017-11-10 | Stop reason: HOSPADM

## 2017-11-06 RX ORDER — ALPRAZOLAM 0.5 MG/1
2 TABLET ORAL
Status: DISCONTINUED | OUTPATIENT
Start: 2017-11-06 | End: 2017-11-10 | Stop reason: HOSPADM

## 2017-11-06 RX ORDER — PREGABALIN 50 MG/1
50 CAPSULE ORAL 3 TIMES DAILY
Status: DISCONTINUED | OUTPATIENT
Start: 2017-11-06 | End: 2017-11-10 | Stop reason: HOSPADM

## 2017-11-06 RX ORDER — PANTOPRAZOLE SODIUM 40 MG/1
40 TABLET, DELAYED RELEASE ORAL
Status: DISCONTINUED | OUTPATIENT
Start: 2017-11-07 | End: 2017-11-10 | Stop reason: HOSPADM

## 2017-11-06 RX ORDER — AZITHROMYCIN 250 MG/1
500 TABLET, FILM COATED ORAL EVERY 24 HOURS
Status: DISCONTINUED | OUTPATIENT
Start: 2017-11-06 | End: 2017-11-10

## 2017-11-06 RX ORDER — AMLODIPINE BESYLATE 5 MG/1
5 TABLET ORAL 2 TIMES DAILY
Status: DISCONTINUED | OUTPATIENT
Start: 2017-11-06 | End: 2017-11-10 | Stop reason: HOSPADM

## 2017-11-06 RX ORDER — ACETAMINOPHEN 325 MG/1
650 TABLET ORAL EVERY 6 HOURS PRN
Status: DISCONTINUED | OUTPATIENT
Start: 2017-11-06 | End: 2017-11-10 | Stop reason: HOSPADM

## 2017-11-06 RX ORDER — HYDROCODONE POLISTIREX AND CHLORPHENIRAMINE POLISTIREX 10; 8 MG/5ML; MG/5ML
5 SUSPENSION, EXTENDED RELEASE ORAL EVERY 12 HOURS PRN
Status: DISCONTINUED | OUTPATIENT
Start: 2017-11-06 | End: 2017-11-10 | Stop reason: HOSPADM

## 2017-11-06 RX ORDER — IPRATROPIUM BROMIDE AND ALBUTEROL SULFATE 2.5; .5 MG/3ML; MG/3ML
3 SOLUTION RESPIRATORY (INHALATION) EVERY 4 HOURS PRN
Status: DISCONTINUED | OUTPATIENT
Start: 2017-11-06 | End: 2017-11-10 | Stop reason: HOSPADM

## 2017-11-06 RX ORDER — HYDROXYZINE HYDROCHLORIDE 25 MG/1
50 TABLET, FILM COATED ORAL
Status: DISCONTINUED | OUTPATIENT
Start: 2017-11-06 | End: 2017-11-10 | Stop reason: HOSPADM

## 2017-11-06 RX ADMIN — IPRATROPIUM BROMIDE AND ALBUTEROL SULFATE 3 ML: .5; 3 SOLUTION RESPIRATORY (INHALATION) at 15:37

## 2017-11-06 RX ADMIN — METHYLPREDNISOLONE SODIUM SUCCINATE 60 MG: 125 INJECTION, POWDER, FOR SOLUTION INTRAMUSCULAR; INTRAVENOUS at 09:43

## 2017-11-06 RX ADMIN — PREGABALIN 50 MG: 50 CAPSULE ORAL at 21:52

## 2017-11-06 RX ADMIN — AMLODIPINE BESYLATE 5 MG: 5 TABLET ORAL at 17:32

## 2017-11-06 RX ADMIN — METHYLPREDNISOLONE SODIUM SUCCINATE 40 MG: 40 INJECTION, POWDER, FOR SOLUTION INTRAMUSCULAR; INTRAVENOUS at 16:28

## 2017-11-06 RX ADMIN — PREGABALIN 50 MG: 50 CAPSULE ORAL at 16:26

## 2017-11-06 RX ADMIN — ENOXAPARIN SODIUM 40 MG: 40 INJECTION SUBCUTANEOUS at 16:27

## 2017-11-06 RX ADMIN — ACETAMINOPHEN 650 MG: 325 TABLET, FILM COATED ORAL at 17:32

## 2017-11-06 RX ADMIN — PRAVASTATIN SODIUM 40 MG: 40 TABLET ORAL at 16:26

## 2017-11-06 RX ADMIN — QUETIAPINE 800 MG: 300 TABLET, EXTENDED RELEASE ORAL at 21:53

## 2017-11-06 RX ADMIN — INSULIN LISPRO 16 UNITS: 100 INJECTION, SOLUTION INTRAVENOUS; SUBCUTANEOUS at 17:05

## 2017-11-06 RX ADMIN — INSULIN LISPRO 3 UNITS: 100 INJECTION, SOLUTION INTRAVENOUS; SUBCUTANEOUS at 21:55

## 2017-11-06 RX ADMIN — ASPIRIN 81 MG 81 MG: 81 TABLET ORAL at 16:26

## 2017-11-06 RX ADMIN — IPRATROPIUM BROMIDE AND ALBUTEROL SULFATE 3 ML: .5; 3 SOLUTION RESPIRATORY (INHALATION) at 19:50

## 2017-11-06 RX ADMIN — MORPHINE SULFATE 4 MG: 4 INJECTION, SOLUTION INTRAMUSCULAR; INTRAVENOUS at 11:22

## 2017-11-06 RX ADMIN — IPRATROPIUM BROMIDE 0.5 MG: 0.5 SOLUTION RESPIRATORY (INHALATION) at 09:41

## 2017-11-06 RX ADMIN — MIRTAZAPINE 30 MG: 15 TABLET, FILM COATED ORAL at 21:52

## 2017-11-06 RX ADMIN — FENOFIBRATE 168 MG: 48 TABLET ORAL at 16:27

## 2017-11-06 RX ADMIN — FLUTICASONE PROPIONATE AND SALMETEROL 1 PUFF: 50; 250 POWDER RESPIRATORY (INHALATION) at 21:51

## 2017-11-06 RX ADMIN — INSULIN LISPRO 6 UNITS: 100 INJECTION, SOLUTION INTRAVENOUS; SUBCUTANEOUS at 17:04

## 2017-11-06 RX ADMIN — ALPRAZOLAM 2 MG: 0.5 TABLET ORAL at 22:09

## 2017-11-06 RX ADMIN — METFORMIN HYDROCHLORIDE 500 MG: 500 TABLET, FILM COATED ORAL at 21:52

## 2017-11-06 RX ADMIN — HYDROXYZINE HYDROCHLORIDE 50 MG: 25 TABLET, FILM COATED ORAL at 21:53

## 2017-11-06 RX ADMIN — AZITHROMYCIN 500 MG: 250 TABLET, FILM COATED ORAL at 16:27

## 2017-11-06 RX ADMIN — ALBUTEROL SULFATE 5 MG: 2.5 SOLUTION RESPIRATORY (INHALATION) at 09:41

## 2017-11-06 RX ADMIN — INSULIN GLARGINE 44 UNITS: 100 INJECTION, SOLUTION SUBCUTANEOUS at 21:54

## 2017-11-06 RX ADMIN — METOPROLOL TARTRATE 25 MG: 25 TABLET ORAL at 21:52

## 2017-11-06 RX ADMIN — LISINOPRIL 20 MG: 20 TABLET ORAL at 16:26

## 2017-11-06 RX ADMIN — METHYLPREDNISOLONE SODIUM SUCCINATE 40 MG: 40 INJECTION, POWDER, FOR SOLUTION INTRAMUSCULAR; INTRAVENOUS at 21:58

## 2017-11-06 NOTE — H&P
History and Physical - [facility] Internal Medicine    Patient Information: Lloyd Quach 61 y o  male MRN: [id number]  Unit/Bed#: [address] [place] [address] Encounter: [id number]  Admitting Physician: Hawk Traore DO  PCP: Merritt Cabral MD  Date of Admission:  11/06/17        Hospital Problem List:     Principal Problem:    COPD exacerbation [facility])  Active Problems:    Bipolar affective ([facility] )    Hypertension    Diabetes mellitus ([facility] )    Acute bronchitis    Left-sided chest wall pain      Assessment/Plan:    1  Acute COPD exacerbation - admit patient for further management  Patient received a dose of IV Solu-Medrol in the ER  Place patient on 40 mg of IV Solu-Medrol q 8 hours  DuoNeb around the clock and as needed  Continue Advair  Consult Pulmonary for further recommendations  Once his symptoms start to improve, will slowly taper down his steroids  2   Acute bronchitis - start patient on p o  Zithromax  Check urine Legionella And pneumococcal antigen, influenza a and B antigen  Check sputum culture if possible  tussionex prn  No obvious pneumonia on CXR    3  Left-sided chest wall pain -likely related to coughing  Troponin x1 was negative in the ER  Check troponin times once more  Patient has had a recent cardiac catheterization which was within normal limits  Check EKG    4  Essential hypertension - continue Norvasc, lisinopril, Lopressor and monitor blood pressures    5  Uncontrolled insulin-dependent diabetes - continue patient on is metformin  He will also be on Lantus and Humalog with meals  Check hemoglobin A1c level in the a [facility] Check Accu-Cheks a c , q h s  with sliding scale insulin     6  Bipolar affective disorder -  Continue effexor-XR, seroquel XR, remeron and cariprazine if he can bring in his own med from home    7  Obstructive sleep apnea - continue with his home CPAP at bedtime    8   Dyslipidemia - continue tricor, statin      VTE Prophylaxis: Enoxaparin (Lovenox)  / sequential compression device   Code Status: Level 1 - Full Code    Anticipated Length of Stay:  Patient will be admitted on an Inpatient basis with an anticipated length of stay of  atleast 2 midnights  Total Time for Visit, including Counseling / Coordination of Care: 45 minutes  Greater than 50% of this total time spent on direct patient counseling and coordination of care  Chief Complaint:     Shortness of Breath (short of breath for 1 week and a cough, pt sts weakness as well  neb tx and inhaler not working well )    of Present Illness:    Tamanna Bermudez is a 61 y o  male who presents with worsening shortness of breath, wheezing x1 week  Patient states that he has been using his nebulizer treatments at home every 4 hours unless sleeping  Patient also reports a cough x1 week which has not improved either  Patient denies any recent travels, immobility, recent surgery, recent steroid use, leg swelling  He states that he recently went to visit family at a nursing home  Patient states that he checked his oxygen saturation at home and it was 88% and has been on lower side over the last week  He does not require oxygen at home normally except for using 2 L of oxygen with his CPAP at night  he reports chills but denies any fever  He also reports some left-sided chest pain under his left breast which he attributes to the coughing  Patient states that within the last 6 months he has had a cardiac catheterization with his cardiologist Dr Brandyn Martin and was within normal limits  Review of Systems:    Review of Systems   Constitutional: Positive for chills  Negative for appetite change and fever  HENT: Positive for congestion  Negative for rhinorrhea, sore throat and trouble swallowing  Eyes: Negative for photophobia and visual disturbance  Respiratory: Positive for cough, shortness of breath and wheezing  Cardiovascular: Positive for chest pain  Negative for palpitations and leg swelling     Gastrointestinal: Negative for abdominal pain, diarrhea, nausea and vomiting  Genitourinary: Negative for dysuria, frequency and hematuria  Musculoskeletal: Negative for neck stiffness  Skin: Negative for wound  Neurological: Negative for syncope and weakness  Hematological: Does not bruise/bleed easily  Psychiatric/Behavioral: Negative for agitation  Past Medical and Surgical History:     Past Medical History:   Diagnosis Date    Bipolar affective ([facility] )     COPD (chronic obstructive pulmonary disease) ([place] [address] )     Coronary artery disease     Diabetes mellitus ([place] [address] )     Hypertension     Obesity     SHAVONNE on CPAP     wears c-pap at 9    Psychiatric disorder     bipolar       Past Surgical History:   Procedure Laterality Date    BACK SURGERY      CARDIAC CATHETERIZATION      CHOLECYSTECTOMY      COLONOSCOPY N/A 1/4/2017    Procedure: COLONOSCOPY;  Surgeon: Cira Escobar MD;  Location: [place] [address] GI LAB; Service:     COLONOSCOPY N/A 9/11/2017    Procedure: COLONOSCOPY;  Surgeon: Tamra Ibrahim MD;  Location: [facility] GI LAB; Service: Gastroenterology    ESOPHAGOGASTRODUODENOSCOPY N/A 3/15/2017    Procedure: ESOPHAGOGASTRODUODENOSCOPY (EGD) WITH BOTOX;  Surgeon: Cira Escobar MD;  Location: [place] [address] GI LAB; Service:     ESOPHAGOGASTRODUODENOSCOPY N/A 1/4/2017    Procedure: ESOPHAGOGASTRODUODENOSCOPY (EGD); Surgeon: Cira Escobar MD;  Location: [facility] GI LAB; Service:     HERNIA REPAIR      INCISION AND DRAINAGE OF WOUND Left 1/13/2016    Procedure: INCISION AND DRAINAGE (I&D) LEFT GROIN ABSCESS DESCENDING TO PERIRECTAL REGION;  Surgeon: Eliezer Harris MD;  Location: [address] [place] [address];  Service:    Maxene Gut ARTHROSCOPY Right 2013    [place] EGD TRANSORAL BIOPSY SINGLE/MULTIPLE N/A 9/20/2017    Procedure: ESOPHAGOGASTRODUODENOSCOPY (EGD); Surgeon: Cira Escobar MD;  Location: [facility] GI LAB;   Service: Gastroenterology       Meds/Allergies:    PTA meds:   Prior to Admission Medications   Prescriptions Last Dose Informant Patient Reported? Taking? ALPRAZolam (XANAX) 2 MG tablet 2017 at Unknown time  Yes Yes   Sig: Take 2 mg by mouth daily at bedtime as needed for anxiety   Cariprazine HCl (VRAYLAR) 4 5 MG CAPS 2017 at Unknown time  Yes Yes   Sig: Take 4 5 mg by mouth daily at bedtime   Liraglutide (VICTOZA SC) 2017 at Unknown time  Yes Yes   Sig: Inject 12 Units under the skin daily with breakfast   QUEtiapine (SEROquel XR) 400 mg 24 hr tablet 2017 at Unknown time  Yes Yes   Sig: Take 800 mg by mouth daily at bedtime  albuterol (2 5 mg/3 mL) 0 083 % nebulizer solution 2017 at Unknown time  Yes Yes   Sig: Take 2 5 mg by nebulization every 6 (six) hours as needed for wheezing  amLODIPine (NORVASC) 5 mg tablet 2017 at Unknown time  Yes Yes   Sig: Take 5 mg by mouth 2 (two) times a day     aspirin 81 MG tablet 2017 at Unknown time  Yes Yes   Sig: Take 81 mg by mouth daily   fenofibrate (TRIGLIDE) 160 MG tablet 2017 at Unknown time  Yes Yes   Sig: Take 160 mg by mouth daily  fluticasone-salmeterol (ADVAIR) 250-50 mcg/dose inhaler 2017 at Unknown time  Yes Yes   Sig: Inhale 1 puff every 12 (twelve) hours  glucose blood test strip 2017 at Unknown time  Yes Yes   Si each by Other route as needed Use as instructed   hydrOXYzine pamoate (VISTARIL) 50 mg capsule 2017 at Unknown time  Yes Yes   Sig: Take 50 mg by mouth daily at bedtime     insulin aspart (NovoLOG) 100 units/mL injection 2017 at Unknown time Self Yes Yes   Sig: Inject 14 Units under the skin 2 (two) times a day before breakfast and lunch     insulin aspart (NovoLOG) 100 units/mL injection 2017 at Unknown time Self Yes Yes   Sig: Inject 16 Units under the skin daily with dinner     insulin glargine (LANTUS) 100 units/mL subcutaneous injection 2017 at Unknown time  Yes Yes   Sig: Inject 44 Units under the skin daily at bedtime     lisinopril (ZESTRIL) 20 mg tablet 2017 at Unknown time  Yes Yes   Sig: Take 20 mg by mouth daily   lovastatin (MEVACOR) 40 MG tablet 11/5/2017 at Unknown time  Yes Yes   Sig: Take 40 mg by mouth daily at bedtime  metFORMIN (GLUCOPHAGE) 1000 MG tablet 11/5/2017 at Unknown time  Yes Yes   Sig: Take 500 mg by mouth daily at bedtime     metoprolol tartrate (LOPRESSOR) 25 mg tablet 11/5/2017 at Unknown time  Yes Yes   Sig: Take 25 mg by mouth every 12 (twelve) hours   mirtazapine (REMERON) 30 mg tablet 11/5/2017 at Unknown time  Yes Yes   Sig: Take 30 mg by mouth daily at bedtime   omeprazole (PriLOSEC) 20 mg delayed release capsule 11/5/2017 at Unknown time  Yes Yes   Sig: Take 20 mg by mouth every evening   pregabalin (LYRICA) 50 mg capsule 11/5/2017 at Unknown time  Yes Yes   Sig: Take 50 mg by mouth 3 (three) times a day  venlafaxine (EFFEXOR) 75 mg tablet 11/5/2017 at Unknown time  Yes Yes   Sig: Take 150 mg by mouth every morning        Facility-Administered Medications: None       Allergies: Allergies   Allergen Reactions    Wellbutrin [Bupropion] Other (See Comments)     Alteration with hearing     History:     Marital Status: Single     Substance Use History:   History   Alcohol Use No     History   Smoking Status    Former Smoker    Packs/day: 2 50    Years: 25 00    Quit date: 2001   Smokeless Tobacco    Never Used     Comment: quit 2001     History   Drug Use No       Family History:    Family History   Problem Relation Age of Onset    No Known Problems Mother     Heart disease Father      exp MI age 64    Heart disease Sister     Diabetes Paternal Grandmother        Physical Exam:     Vitals:   Blood Pressure: 156/73 (11/06/17 1226)  Pulse: 100 (11/06/17 1300)  Temperature: 98 °F (36 7 °C) (11/06/17 0838)  Temp Source: Oral (11/06/17 1830)  Respirations: 18 (11/06/17 1300)  Weight - Scale: 126 kg (277 lb) (11/06/17 0838)  SpO2: 92 % (11/06/17 1300)    Physical Exam   Constitutional: He is oriented to person, place, and time   He appears well-developed and well-nourished  No distress  HENT:   Head: Normocephalic and atraumatic  Eyes: EOM are normal  Right eye exhibits no discharge  Left eye exhibits no discharge  No scleral icterus  Neck: Neck supple  No tracheal deviation present  Cardiovascular: Normal rate and regular rhythm  Pulmonary/Chest: Effort normal  No respiratory distress  He has no rales  Decreased breath sounds B/L  Intermittent wheezing noted   Abdominal: Soft  Bowel sounds are normal  He exhibits no distension  There is no tenderness  Musculoskeletal: He exhibits no edema  Neurological: He is alert and oriented to person, place, and time  No cranial nerve deficit  Skin: Skin is dry  He is not diaphoretic  Psychiatric: He has a normal mood and affect  Lab Results: I have personally reviewed pertinent reports  Results from last 7 days  Lab Units 11/06/17  0927   WBC Thousand/uL 6 50   HEMOGLOBIN g/dL 14 3   HEMATOCRIT % 41 9*   PLATELETS Thousands/uL 179   NEUTROS PCT % 57   LYMPHS PCT % 35   MONOS PCT % 7   EOS PCT % 1       Results from last 7 days  Lab Units 11/06/17  0927   SODIUM mmol/L 134*   POTASSIUM mmol/L 4 5   CHLORIDE mmol/L 96*   CO2 mmol/L 27   BUN mg/dL 27*   CREATININE mg/dL 1 03   CALCIUM mg/dL 9 1   GLUCOSE RANDOM mg/dL 280*           Imaging: I have personally reviewed pertinent reports  No results found  XR chest 2 views    (Results Pending)       EKG, Pathology, and Other Studies Reviewed on Admission:   · EKG - none done  · CXR - no obvious pneumonia    Allscripts Records Reviewed: Yes     ** Please Note: Dragon 360 Dictation voice to text software may have been used in the creation of this document   **

## 2017-11-06 NOTE — PLAN OF CARE
Problem: RESPIRATORY - ADULT  Goal: Achieves optimal ventilation and oxygenation  INTERVENTIONS:  - Assess for changes in respiratory status  - Assess for changes in mentation and behavior  - Position to facilitate oxygenation and minimize respiratory effort  - Oxygen administration by appropriate delivery method based on oxygen saturation (per order) or ABGs  - Initiate smoking cessation education as indicated  - Encourage broncho-pulmonary hygiene including cough, deep breathe, Incentive Spirometry  - Assess the need for suctioning and aspirate as needed  - Assess and instruct to report SOB or any respiratory difficulty  - Respiratory Therapy support as indicated  Outcome: Progressing  resp poc initiated pt on nebulizer therapy and cpap from home for HS, will update on 11/9  For changes

## 2017-11-06 NOTE — PLAN OF CARE
Problem: Potential for Falls  Goal: Patient will remain free of falls  INTERVENTIONS:  - Assess patient frequently for physical needs  -  Identify cognitive and physical deficits and behaviors that affect risk of falls  -  Harvey fall precautions as indicated by assessment   - Educate patient/family on patient safety including physical limitations  - Instruct patient to call for assistance with activity based on assessment  - Modify environment to reduce risk of injury  - Consider OT/PT consult to assist with strengthening/mobility   Outcome: Progressing      Problem: RESPIRATORY - ADULT  Goal: Achieves optimal ventilation and oxygenation  INTERVENTIONS:  - Assess for changes in respiratory status  - Assess for changes in mentation and behavior  - Position to facilitate oxygenation and minimize respiratory effort  - Oxygen administration by appropriate delivery method based on oxygen saturation (per order) or ABGs  - Initiate smoking cessation education as indicated  - Encourage broncho-pulmonary hygiene including cough, deep breathe, Incentive Spirometry  - Assess the need for suctioning and aspirate as needed  - Assess and instruct to report SOB or any respiratory difficulty  - Respiratory Therapy support as indicated   Outcome: Progressing      Problem: CARDIOVASCULAR - ADULT  Goal: Maintains optimal cardiac output and hemodynamic stability  INTERVENTIONS:  - Monitor I/O, vital signs and rhythm  - Monitor for S/S and trends of decreased cardiac output i e  bleeding, hypotension  - Administer and titrate ordered vasoactive medications to optimize hemodynamic stability  - Assess quality of pulses, skin color and temperature  - Assess for signs of decreased coronary artery perfusion - ex   Angina  - Instruct patient to report change in severity of symptoms  Outcome: Progressing    Goal: Absence of cardiac dysrhythmias or at baseline rhythm  INTERVENTIONS:  - Continuous cardiac monitoring, monitor vital signs, obtain 12 lead EKG if indicated  - Administer antiarrhythmic and heart rate control medications as ordered  - Monitor electrolytes and administer replacement therapy as ordered  Outcome: Progressing      Problem: METABOLIC, FLUID AND ELECTROLYTES - ADULT  Goal: Glucose maintained within target range  INTERVENTIONS:  - Monitor Blood Glucose as ordered  - Assess for signs and symptoms of hyperglycemia and hypoglycemia  - Administer ordered medications to maintain glucose within target range  - Assess nutritional intake and initiate nutrition service referral as needed  Outcome: Progressing

## 2017-11-07 LAB
ANION GAP SERPL CALCULATED.3IONS-SCNC: 14 MMOL/L (ref 4–13)
BUN SERPL-MCNC: 26 MG/DL (ref 5–25)
CALCIUM SERPL-MCNC: 9.8 MG/DL (ref 8.3–10.1)
CHLORIDE SERPL-SCNC: 92 MMOL/L (ref 100–108)
CO2 SERPL-SCNC: 25 MMOL/L (ref 21–32)
CREAT SERPL-MCNC: 1.06 MG/DL (ref 0.6–1.3)
ERYTHROCYTE [DISTWIDTH] IN BLOOD BY AUTOMATED COUNT: 13.3 % (ref 11.6–15.1)
EST. AVERAGE GLUCOSE BLD GHB EST-MCNC: 232 MG/DL
FLUAV AG SPEC QL: NORMAL
FLUBV AG SPEC QL: NORMAL
GFR SERPL CREATININE-BSD FRML MDRD: 77 ML/MIN/1.73SQ M
GLUCOSE SERPL-MCNC: 308 MG/DL (ref 65–140)
GLUCOSE SERPL-MCNC: 321 MG/DL (ref 65–140)
GLUCOSE SERPL-MCNC: 327 MG/DL (ref 65–140)
GLUCOSE SERPL-MCNC: 349 MG/DL (ref 65–140)
GLUCOSE SERPL-MCNC: 356 MG/DL (ref 65–140)
HBA1C MFR BLD: 9.7 % (ref 4.2–6.3)
HCT VFR BLD AUTO: 42.5 % (ref 42–52)
HGB BLD-MCNC: 14.4 G/DL (ref 14–18)
L PNEUMO1 AG UR QL IA.RAPID: NEGATIVE
MAGNESIUM SERPL-MCNC: 2.3 MG/DL (ref 1.6–2.6)
MCH RBC QN AUTO: 31 PG (ref 27–31)
MCHC RBC AUTO-ENTMCNC: 33.9 G/DL (ref 31.4–37.4)
MCV RBC AUTO: 91 FL (ref 82–98)
PLATELET # BLD AUTO: 198 THOUSANDS/UL (ref 130–400)
PMV BLD AUTO: 7.9 FL (ref 8.9–12.7)
POTASSIUM SERPL-SCNC: 4.7 MMOL/L (ref 3.5–5.3)
RBC # BLD AUTO: 4.65 MILLION/UL (ref 4.7–6.1)
RSV B RNA SPEC QL NAA+PROBE: NORMAL
S PNEUM AG UR QL: NEGATIVE
SODIUM SERPL-SCNC: 131 MMOL/L (ref 136–145)
WBC # BLD AUTO: 11.6 THOUSAND/UL (ref 4.8–10.8)

## 2017-11-07 PROCEDURE — 80048 BASIC METABOLIC PNL TOTAL CA: CPT | Performed by: FAMILY MEDICINE

## 2017-11-07 PROCEDURE — 94760 N-INVAS EAR/PLS OXIMETRY 1: CPT

## 2017-11-07 PROCEDURE — 82948 REAGENT STRIP/BLOOD GLUCOSE: CPT

## 2017-11-07 PROCEDURE — 85027 COMPLETE CBC AUTOMATED: CPT | Performed by: FAMILY MEDICINE

## 2017-11-07 PROCEDURE — 83735 ASSAY OF MAGNESIUM: CPT | Performed by: FAMILY MEDICINE

## 2017-11-07 PROCEDURE — 83036 HEMOGLOBIN GLYCOSYLATED A1C: CPT | Performed by: FAMILY MEDICINE

## 2017-11-07 PROCEDURE — 94640 AIRWAY INHALATION TREATMENT: CPT

## 2017-11-07 RX ORDER — INSULIN GLARGINE 100 [IU]/ML
50 INJECTION, SOLUTION SUBCUTANEOUS
Status: DISCONTINUED | OUTPATIENT
Start: 2017-11-07 | End: 2017-11-08

## 2017-11-07 RX ADMIN — ALPRAZOLAM 2 MG: 0.5 TABLET ORAL at 22:04

## 2017-11-07 RX ADMIN — IPRATROPIUM BROMIDE AND ALBUTEROL SULFATE 3 ML: .5; 3 SOLUTION RESPIRATORY (INHALATION) at 08:51

## 2017-11-07 RX ADMIN — PREGABALIN 50 MG: 50 CAPSULE ORAL at 08:03

## 2017-11-07 RX ADMIN — LISINOPRIL 20 MG: 20 TABLET ORAL at 08:03

## 2017-11-07 RX ADMIN — METHYLPREDNISOLONE SODIUM SUCCINATE 40 MG: 40 INJECTION, POWDER, FOR SOLUTION INTRAMUSCULAR; INTRAVENOUS at 14:56

## 2017-11-07 RX ADMIN — METFORMIN HYDROCHLORIDE 500 MG: 500 TABLET, FILM COATED ORAL at 22:03

## 2017-11-07 RX ADMIN — INSULIN LISPRO 14 UNITS: 100 INJECTION, SOLUTION INTRAVENOUS; SUBCUTANEOUS at 12:48

## 2017-11-07 RX ADMIN — AMLODIPINE BESYLATE 5 MG: 5 TABLET ORAL at 17:52

## 2017-11-07 RX ADMIN — PREGABALIN 50 MG: 50 CAPSULE ORAL at 21:26

## 2017-11-07 RX ADMIN — VENLAFAXINE HYDROCHLORIDE 150 MG: 150 CAPSULE, EXTENDED RELEASE ORAL at 08:02

## 2017-11-07 RX ADMIN — FLUTICASONE PROPIONATE AND SALMETEROL 1 PUFF: 50; 250 POWDER RESPIRATORY (INHALATION) at 08:01

## 2017-11-07 RX ADMIN — METHYLPREDNISOLONE SODIUM SUCCINATE 40 MG: 40 INJECTION, POWDER, FOR SOLUTION INTRAMUSCULAR; INTRAVENOUS at 06:04

## 2017-11-07 RX ADMIN — ACETAMINOPHEN 650 MG: 325 TABLET, FILM COATED ORAL at 15:43

## 2017-11-07 RX ADMIN — QUETIAPINE 800 MG: 300 TABLET, EXTENDED RELEASE ORAL at 22:03

## 2017-11-07 RX ADMIN — INSULIN LISPRO 5 UNITS: 100 INJECTION, SOLUTION INTRAVENOUS; SUBCUTANEOUS at 12:47

## 2017-11-07 RX ADMIN — FLUTICASONE PROPIONATE AND SALMETEROL 1 PUFF: 50; 250 POWDER RESPIRATORY (INHALATION) at 21:26

## 2017-11-07 RX ADMIN — INSULIN LISPRO 14 UNITS: 100 INJECTION, SOLUTION INTRAVENOUS; SUBCUTANEOUS at 08:00

## 2017-11-07 RX ADMIN — IPRATROPIUM BROMIDE AND ALBUTEROL SULFATE 3 ML: .5; 3 SOLUTION RESPIRATORY (INHALATION) at 20:12

## 2017-11-07 RX ADMIN — ASPIRIN 81 MG 81 MG: 81 TABLET ORAL at 08:03

## 2017-11-07 RX ADMIN — FENOFIBRATE 168 MG: 48 TABLET ORAL at 08:01

## 2017-11-07 RX ADMIN — INSULIN LISPRO 5 UNITS: 100 INJECTION, SOLUTION INTRAVENOUS; SUBCUTANEOUS at 07:59

## 2017-11-07 RX ADMIN — AMLODIPINE BESYLATE 5 MG: 5 TABLET ORAL at 08:03

## 2017-11-07 RX ADMIN — INSULIN GLARGINE 50 UNITS: 100 INJECTION, SOLUTION SUBCUTANEOUS at 22:01

## 2017-11-07 RX ADMIN — IPRATROPIUM BROMIDE AND ALBUTEROL SULFATE 3 ML: .5; 3 SOLUTION RESPIRATORY (INHALATION) at 14:35

## 2017-11-07 RX ADMIN — METOPROLOL TARTRATE 25 MG: 25 TABLET ORAL at 08:03

## 2017-11-07 RX ADMIN — IPRATROPIUM BROMIDE AND ALBUTEROL SULFATE 3 ML: .5; 3 SOLUTION RESPIRATORY (INHALATION) at 02:16

## 2017-11-07 RX ADMIN — METHYLPREDNISOLONE SODIUM SUCCINATE 40 MG: 40 INJECTION, POWDER, FOR SOLUTION INTRAMUSCULAR; INTRAVENOUS at 22:01

## 2017-11-07 RX ADMIN — HYDROXYZINE HYDROCHLORIDE 50 MG: 25 TABLET, FILM COATED ORAL at 22:03

## 2017-11-07 RX ADMIN — HYDROCODONE POLISTIREX AND CHLORPHENIRAMINE POLISTIREX 5 ML: 10; 8 SUSPENSION, EXTENDED RELEASE ORAL at 14:58

## 2017-11-07 RX ADMIN — AZITHROMYCIN 500 MG: 250 TABLET, FILM COATED ORAL at 15:43

## 2017-11-07 RX ADMIN — PANTOPRAZOLE SODIUM 40 MG: 40 TABLET, DELAYED RELEASE ORAL at 06:03

## 2017-11-07 RX ADMIN — MIRTAZAPINE 30 MG: 15 TABLET, FILM COATED ORAL at 22:03

## 2017-11-07 RX ADMIN — INSULIN LISPRO 6 UNITS: 100 INJECTION, SOLUTION INTRAVENOUS; SUBCUTANEOUS at 17:52

## 2017-11-07 RX ADMIN — PRAVASTATIN SODIUM 40 MG: 40 TABLET ORAL at 15:43

## 2017-11-07 RX ADMIN — PREGABALIN 50 MG: 50 CAPSULE ORAL at 15:43

## 2017-11-07 RX ADMIN — INSULIN LISPRO 16 UNITS: 100 INJECTION, SOLUTION INTRAVENOUS; SUBCUTANEOUS at 17:52

## 2017-11-07 RX ADMIN — METOPROLOL TARTRATE 25 MG: 25 TABLET ORAL at 21:26

## 2017-11-07 RX ADMIN — INSULIN LISPRO 3 UNITS: 100 INJECTION, SOLUTION INTRAVENOUS; SUBCUTANEOUS at 22:01

## 2017-11-07 RX ADMIN — ENOXAPARIN SODIUM 40 MG: 40 INJECTION SUBCUTANEOUS at 08:01

## 2017-11-07 NOTE — SOCIAL WORK
Met with pt  Resides with roommate/friend Guy Abarca whom he states is his emergency contact  Pt uses no dme  No hhc  Has been independent, but does not drive  He can, but has not been feeling well  Resides in second floor apt with flight of steps to main floor  Did provide pt with copies of the [facility] living will form and medical POA per his request   Explained role of cm, no d/c needs anticipated  CM reviewed d/c planning process including the following: identifying help at home, patient preference for d/c planning needs, and availability of the treatment team to discuss questions or concerns patient and/or family may have regarding understanding of medications and recognizing signs and symptoms once discharged  CM also encouraged patient to follow up with all recommended appointments after discharge  Patient advised of importance for patient and family to participate in managing patient's medical well being

## 2017-11-07 NOTE — PLAN OF CARE

## 2017-11-07 NOTE — CASE MANAGEMENT
Initial Clinical Review    Admission: Date/Time/Statement: 11/6/17 @ 1207     Orders Placed This Encounter   Procedures    Inpatient Admission (expected length of stay for this patient is greater than two midnights)     Standing Status:   Standing     Number of Occurrences:   1     Order Specific Question:   Admitting Physician     Answer:   Dianna Romo [[id number]]     Order Specific Question:   Level of Care     Answer:   Med Surg [[id number]]     Order Specific Question:   Estimated length of stay     Answer:   More than 2 Midnights     Order Specific Question:   Certification     Answer:   I certify that inpatient services are medically necessary for this patient for a duration of greater than two midnights  See H&P and MD Progress Notes for additional information about the patient's course of treatment  ED: Date/Time/Mode of Arrival:   ED Arrival Information     Expected Arrival Acuity Means of Arrival Escorted By Service Admission Type    - 11/6/2017 08:28 Urgent Walk-In Self General Medicine Urgent    Arrival Complaint    chest congestion x 7 days      Chief Complaint:   Chief Complaint   Patient presents with    Shortness of Breath     short of breath for 1 week and a cough, pt sts weakness as well  neb tx and inhaler not working well    History of Illness:   Esmer Ward is a 61 y o  male who presents with worsening shortness of breath, wheezing x1 week  Patient states that he has been using his nebulizer treatments at home every 4 hours unless sleeping  Patient also reports a cough x1 week which has not improved either  Patient denies any recent travels, immobility, recent surgery, recent steroid use, leg swelling  He states that he recently went to visit family at a nursing home  Patient states that he checked his oxygen saturation at home and it was 88% and has been on lower side over the last week  He does not require oxygen at home normally except for using 2 L of oxygen with his CPAP at night    he reports chills but denies any fever  He also reports some left-sided chest pain under his left breast which he attributes to the coughing  Patient states that within the last 6 months he has had a cardiac catheterization with his cardiologist Dr Anand Savage and was within normal limits  ED Vital Signs:   ED Triage Vitals [11/06/17 0838]   Temperature Pulse Respirations Blood Pressure SpO2   98 °F (36 7 °C) 102 20 167/76 90 %      Temp Source Heart Rate Source Patient Position - Orthostatic VS BP Location FiO2 (%)   Oral Monitor Sitting Right arm --      Pain Score       8        Wt Readings from Last 1 Encounters:   11/06/17 126 kg (278 lb 14 1 oz)   Vital Signs (abnormal):   /94  Abnormal Labs/Diagnostic Test Results:    CL 96 BUN 27 GLUC 280   TROP NEG  CXR=Linear airspace abnormality at the right lung base similar to prior study may represent scarring or atelectasis  Stable cardiomegaly  ED Treatment:   Medication Administration from 11/06/2017 0828 to 11/06/2017 1238       Date/Time Order Dose Route Action Action by Comments     11/06/2017 0941 albuterol inhalation solution 5 mg 5 mg Nebulization Given Rogelio Levin RN      11/06/2017 0941 ipratropium (ATROVENT) 0 02 % inhalation solution 0 5 mg 0 5 mg Nebulization Given Rogelio Levin RN      11/06/2017 5330 methylPREDNISolone sodium succinate (Solu-MEDROL) injection 60 mg 60 mg Intravenous Given Rogelio Levin RN      11/06/2017 1122 morphine (PF) 4 mg/mL injection 4 mg 4 mg Intravenous Given Rogelio Levin RN       Past Medical/Surgical History:    Active Ambulatory Problems     Diagnosis Date Noted    Bipolar affective ([facility] )     COPD (chronic obstructive pulmonary disease) ([facility] ) 01/13/2016    Hypertension 09/02/2016    Diabetes mellitus ([facility] ) 09/02/2016    Chronic fatigue syndrome 09/02/2016    Fatigue 09/02/2016    ANA LILIA (acute kidney injury) ([facility] ) 11/27/2016    Psychiatric disorder     SHAVONNE on CPAP     Obesity     COPD exacerbation ([place] [address] ) 03/20/2017    Acute bronchitis 03/20/2017    Hyperglycemia due to type 2 diabetes mellitus ([place] [address] ) 03/20/2017    Left-sided chest wall pain 03/20/2017    Intractable abdominal pain 09/06/2017    CAD (coronary artery disease) 09/06/2017    GERD (gastroesophageal reflux disease) 09/06/2017    Abdominal pain 09/06/2017     Resolved Ambulatory Problems     Diagnosis Date Noted    Cough 01/13/2016    Left upper quadrant pain 01/13/2016    Hyponatremia 09/02/2016    Acute kidney injury ([place] [address] ) 03/20/2017    Acute bronchitis due to respiratory syncytial virus (RSV) 03/21/2017    Acute bacterial bronchitis 03/22/2017    Diarrhea in adult patient 09/06/2017     Past Medical History:   Diagnosis Date    Bipolar affective ([place] [address] )     COPD (chronic obstructive pulmonary disease) ([place] [address] )     Coronary artery disease     Diabetes mellitus ([place] [address] )     Hypertension     Obesity     SHAVONNE on CPAP     Psychiatric disorder    Admitting Diagnosis: Chest pain [R07 9]  Chest congestion [R09 89]  Trouble breathing [R06 89]  COPD exacerbation (HCC) [J44 1]  Age/Sex: 61 y o  male  Assessment/Plan:   Principal Problem:    COPD exacerbation (HCC)  Active Problems:    Bipolar affective ([place] [address] )    Hypertension    Diabetes mellitus (HCC)    Acute bronchitis    Left-sided chest wall pain  Assessment/Plan:  1  Acute COPD exacerbation - admit patient for further management  Patient received a dose of IV Solu-Medrol in the ER  Place patient on 40 mg of IV Solu-Medrol q 8 hours  DuoNeb around the clock and as needed  Continue Advair  Consult Pulmonary for further recommendations  Once his symptoms start to improve, will slowly taper down his steroids  2   Acute bronchitis - start patient on p o  Zithromax  Check urine Legionella And pneumococcal antigen, influenza a and B antigen  Check sputum culture if possible  tussionex prn  No obvious pneumonia on CXR  3   Left-sided chest wall pain -likely related to coughing  Troponin x1 was negative in the ER  Check troponin times once more  Patient has had a recent cardiac catheterization which was within normal limits  Check EKG  4  Essential hypertension - continue Norvasc, lisinopril, Lopressor and monitor blood pressures  5  Uncontrolled insulin-dependent diabetes - continue patient on is metformin  He will also be on Lantus and Humalog with meals  Check hemoglobin A1c level in the a m  Darryl Glass Check Accu-Cheks a c , q h s  with sliding scale insulin   6  Bipolar affective disorder -  Continue effexor-XR, seroquel XR, remeron and cariprazine if he can bring in his own med from home  7  Obstructive sleep apnea - continue with his home CPAP at bedtime  8   Dyslipidemia - continue tricor, statin  Anticipated Length of Stay:  Patient will be admitted on an Inpatient basis with an anticipated length of stay of  atleast 2 midnights       Admission Orders:  TELEMETRY  CONSULT PULMONARY  ACCUCHECKS WITH COVERAGE SCALE  BLE VENODYNES  Scheduled Meds:   amLODIPine 5 mg Oral BID   aspirin 81 mg Oral Daily   azithromycin 500 mg Oral Q24H   Cariprazine HCl 4 5 mg Oral HS   enoxaparin 40 mg Subcutaneous Daily   fenofibrate 168 mg Oral Daily   fluticasone-salmeterol 1 puff Inhalation Q12H LELAND   hydrOXYzine HCL 50 mg Oral HS   insulin glargine 44 Units Subcutaneous HS   insulin lispro 1-5 Units Subcutaneous HS   insulin lispro 1-6 Units Subcutaneous TID AC   insulin lispro 14 Units Subcutaneous BID before breakfast/lunch   insulin lispro 16 Units Subcutaneous Daily With Dinner   ipratropium-albuterol 3 mL Nebulization Q6H   lisinopril 20 mg Oral Daily   metFORMIN 500 mg Oral HS   methylPREDNISolone sodium succinate 40 mg Intravenous Q8H [address]   metoprolol tartrate 25 mg Oral Q12H LELAND   mirtazapine 30 mg Oral HS   pantoprazole 40 mg Oral Early Morning   pravastatin 40 mg Oral Daily With Dinner   pregabalin 50 mg Oral TID   QUEtiapine 800 mg Oral HS   venlafaxine 150 mg Oral Daily Continuous Infusions:    PRN Meds:   acetaminophen    ALPRAZolam    hydrocodone-chlorpheniramine polistirex    ipratropium-albuterol    ondansetron

## 2017-11-07 NOTE — CONSULTS
Consultation - Pulmonary Medicine   Elizabeth Mederos 61 y o  male MRN: [id number]  Unit/Bed#: [address] [place] [address] Encounter: [id number]      Assessment:  Acute bronchitis with exacerbation underlying chronic bronchitis  Mild SHAVONNE  Alveolar hypoventilation secondary to obesity  Hypertension  Diabetes mellitus      Plan:   I concur with IV methylprednisolone 40 mg IV every 8 hours and p o  Azithromycin  Nebulized treatments with DuoNeb is ordered  CPAP of 9 with 2 L of oxygen at bedtime  I did encourage patient to lose weight  Check sputum culture    History of Present Illness   Physician Requesting Consult: Mike Del Toro DO  Reason for Consult / Principal Problem:  COPD exacerbation, SHAVONNE  Hx and PE limited by: none  HPI: Elizabeth Mederos is a 61y o  year old male who presents with complaints of worsening cough with a past several days  States the cough has been of some white yellow mucus  Also start have some fever at home, chills, and increasing wheezing and shortness of breath  Patient does have a pulse oximeter at home and noted his O2 saturation had come down 88% on room air  Portable chest x-ray does not show any infiltrates  There is some cardiomegaly  The patient has been started on methylprednisone 40 mg IV every 8 hours and p o  azithromycin 500 mg daily  He also was receiving nebulizer treatments with albuterol-ipratropium every 6 hours  He does feel somewhat better today and less short of breath  Initial white blood cell count was 6 5 with a hemoglobin of 14 3  Nasal swab for influenza and RSV are negative  Also urine for strep pneumonia and Legionella antigen are negative  Patient states he has a history smoked anywhere from 1-3 packs of cigarettes per day for at least 25 years and quit about 20 years ago  He did have spirometry done in my office in April 2017 which showed severe restrictive impairment with a forced vital capacity of 2 19 L or 45% of predicted    He has been receiving treatment with Spiriva, Advair, and albuterol inhaler  He does have history of mild SHAVONNE  Diagnostic sleep study done October 2016 showed an overall AHI 5 6 with an oxygen constanza of 82%  He required a CPAP pressure of 9 cm H2O but because of some persistent decreased O2 saturation was prescribed 2 L of oxygen to be used with the CPAP  This is likely due to alveolar hypoventilation from obesity  Review of Systems   Constitutional:        Complains of fever and chills at home  Also has had a cough which has been worsening over last 2-3 days and productive for some white and yellow mucus  Patient is having wheezing and shortness of breath  Also some left chest pain with coughing   HENT: Negative for ear discharge, postnasal drip, rhinorrhea and sneezing  Eyes: Negative for pain and itching  Respiratory: Positive for cough, shortness of breath and wheezing  Cardiovascular: Negative for palpitations and leg swelling  Gastrointestinal: Negative for abdominal pain, nausea and vomiting  Endocrine: Negative for polydipsia and polyphagia  Genitourinary: Negative for dysuria and hematuria  Musculoskeletal: Negative for arthralgias  Skin: Negative for rash  Neurological: Negative for light-headedness and numbness  Psychiatric/Behavioral: Negative for agitation and confusion  Historical Information   Past Medical History:   Diagnosis Date    [facility] )     [facility]chronic [facility] disease) [facility]     Coronary artery disease     Diabetes mellitus [facility]     Hypertension     Obesity     SHAVONNE on CPAP     wears c-pap at 9    Psychiatric disorder     bipolar     Past Surgical History:   Procedure Laterality Date    BACK SURGERY      CARDIAC CATHETERIZATION      CHOLECYSTECTOMY      COLONOSCOPY N/A 1/4/2017    Procedure: COLONOSCOPY;  Surgeon: Bruna Cantor MD;  Location: [place] [address] GI LAB;   Service:     COLONOSCOPY N/A 9/11/2017    Procedure: COLONOSCOPY;  Surgeon: Keith Mcghee MD;  Location: [place] [address] GI LAB; Service: Gastroenterology    ESOPHAGOGASTRODUODENOSCOPY N/A 3/15/2017    Procedure: ESOPHAGOGASTRODUODENOSCOPY (EGD) WITH BOTOX;  Surgeon: Carmen Clay MD;  Location: [place] [address] GI LAB; Service:     ESOPHAGOGASTRODUODENOSCOPY N/A 1/4/2017    Procedure: ESOPHAGOGASTRODUODENOSCOPY (EGD); Surgeon: Carmen Clay MD;  Location: [facility] GI LAB; Service:     HERNIA REPAIR      INCISION AND DRAINAGE OF WOUND Left 1/13/2016    Procedure: INCISION AND DRAINAGE (I&D) LEFT GROIN ABSCESS DESCENDING TO PERIRECTAL REGION;  Surgeon: Baudilio Rosas MD;  Location: [address] [place] [address];  Service:    Jerri Blood ARTHROSCOPY Right 2013    AL EGD TRANSORAL BIOPSY SINGLE/MULTIPLE N/A 9/20/2017    Procedure: ESOPHAGOGASTRODUODENOSCOPY (EGD); Surgeon: Carmen Clay MD;  Location: [facility] GI LAB;   Service: Gastroenterology     Social History   History   Alcohol Use No     History   Drug Use No     History   Smoking Status    Former Smoker    Packs/day: 2 50    Years: 25 00    Quit date: 2001   Smokeless Tobacco    Never Used     Comment: quit 2001         Family History:   Family History   Problem Relation Age of Onset    No Known Problems Mother     Heart disease Father      exp MI age 64    Heart disease Sister     Diabetes Paternal Grandmother        Meds/Allergies     Current Facility-Administered Medications:     acetaminophen (TYLENOL) tablet 650 mg, 650 mg, Oral, Q6H PRN, Jeri Revankar, DO, 650 mg at 11/07/17 1543    ALPRAZolam (XANAX) tablet 2 mg, 2 mg, Oral, HS PRN, Jeri Revankar, DO, 2 mg at 11/06/17 2209    amLODIPine (NORVASC) tablet 5 mg, 5 mg, Oral, BID, Jeri Revankar, DO, 5 mg at 11/07/17 0803    aspirin chewable tablet 81 mg, 81 mg, Oral, Daily, Jeri Revankar, DO, 81 mg at 11/07/17 0803    azithromycin (ZITHROMAX) tablet 500 mg, 500 mg, Oral, Q24H, Ejri Revankar, DO, 500 mg at 11/07/17 1543    Cariprazine HCl CAPS 4 5 mg, 4 5 mg, Oral, HS, Jeri Pfeiffer, DO    enoxaparin (LOVENOX) subcutaneous injection 40 mg, 40 mg, Subcutaneous, Daily, Jeri Revankar, DO, 40 mg at 11/07/17 0801    fenofibrate (TRICOR) tablet 168 mg, 168 mg, Oral, Daily, Jeri Revankar, DO, 168 mg at 11/07/17 0801    fluticasone-salmeterol (ADVAIR) 250-50 mcg/dose inhaler 1 puff, 1 puff, Inhalation, Q12H [address], Jeri Revankar, DO, 1 puff at 11/07/17 0801    hydrocodone-chlorpheniramine polistirex (TUSSIONEX) 10-8 mg/5 mL ER suspension 5 mL, 5 mL, Oral, Q12H PRN, Jeri Revankar, DO, 5 mL at 11/07/17 1458    hydrOXYzine HCL (ATARAX) tablet 50 mg, 50 mg, Oral, HS, Jeri Revankar, DO, 50 mg at 11/06/17 2153    insulin glargine (LANTUS) subcutaneous injection 44 Units, 44 Units, Subcutaneous, HS, Jeri Revankar, DO, 44 Units at 11/06/17 2154    insulin lispro (HumaLOG) 100 units/mL subcutaneous injection 1-5 Units, 1-5 Units, Subcutaneous, HS, Jeri Revankar, DO, 3 Units at 11/06/17 2155    insulin lispro (HumaLOG) 100 units/mL subcutaneous injection 1-6 Units, 1-6 Units, Subcutaneous, TID AC, 5 Units at 11/07/17 1247 **AND** Fingerstick Glucose (POCT), , , TID AC, Jeri Revankar, DO    insulin lispro (HumaLOG) 100 units/mL subcutaneous injection 14 Units, 14 Units, Subcutaneous, BID before breakfast/lunch, Jeri Revankar, DO, 14 Units at 11/07/17 1248    insulin lispro (HumaLOG) 100 units/mL subcutaneous injection 16 Units, 16 Units, Subcutaneous, Daily With Dinner, Jeri Revankar, DO, 16 Units at 11/06/17 1705    ipratropium-albuterol (DUO-NEB) 0 5-2 5 mg/mL inhalation solution 3 mL, 3 mL, Nebulization, Q4H PRN, Jeri Revankar, DO    ipratropium-albuterol (DUO-NEB) 0 5-2 5 mg/mL inhalation solution 3 mL, 3 mL, Nebulization, Q6H, Jeri Revankar, DO, 3 mL at 11/07/17 1435    lisinopril (ZESTRIL) tablet 20 mg, 20 mg, Oral, Daily, Jeri Revankar, DO, 20 mg at 11/07/17 0803    metFORMIN (GLUCOPHAGE) tablet 500 mg, 500 mg, Oral, HS, Jeri Revankar, DO, 500 mg at 11/06/17 2152    methylPREDNISolone sodium succinate (Solu-MEDROL) injection 40 mg, 40 mg, Intravenous, Q8H LELAND, Jeri Revankar, DO, 40 mg at 11/07/17 1456    metoprolol tartrate (LOPRESSOR) tablet 25 mg, 25 mg, Oral, Q12H LELAND, Jeri Revankar, DO, 25 mg at 11/07/17 0803    mirtazapine (REMERON) tablet 30 mg, 30 mg, Oral, HS, Jeri Revankar, DO, 30 mg at 11/06/17 2152    ondansetron (ZOFRAN) injection 4 mg, 4 mg, Intravenous, Q6H PRN, Jeri Revankar, DO    pantoprazole (PROTONIX) EC tablet 40 mg, 40 mg, Oral, Early Morning, Jeri Revankar, DO, 40 mg at 11/07/17 0603    pravastatin (PRAVACHOL) tablet 40 mg, 40 mg, Oral, Daily With Dinner, Jeri Revankar, DO, 40 mg at 11/07/17 1543    pregabalin (LYRICA) capsule 50 mg, 50 mg, Oral, TID, Jeri Revankar, DO, 50 mg at 11/07/17 1543    QUEtiapine (SEROquel XR) 24 hr tablet 800 mg, 800 mg, Oral, HS, Jeri Revankar, DO, 800 mg at 11/06/17 2153    venlafaxine (EFFEXOR-XR) 24 hr capsule 150 mg, 150 mg, Oral, Daily, Jeri Revankar, DO, 150 mg at 11/07/17 0802    Prior to Admission medications    Medication Sig Start Date End Date Taking? Authorizing Provider   albuterol (2 5 mg/3 mL) 0 083 % nebulizer solution Take 2 5 mg by nebulization every 6 (six) hours as needed for wheezing  Yes Historical Provider, MD   ALPRAZolam [facility]) 2 MG tablet Take 2 mg by mouth daily at bedtime as needed for anxiety   Yes Historical Provider, MD   amLODIPine (NORVASC) 5 mg tablet Take 5 mg by mouth 2 (two) times a day     Yes Historical Provider, MD   aspirin 81 MG tablet Take 81 mg by mouth daily   Yes Historical Provider, MD   Cariprazine HCl (VRAYLAR) 4 5 MG CAPS Take 4 5 mg by mouth daily at bedtime   Yes Historical Provider, MD   fenofibrate (TRIGLIDE) 160 MG tablet Take 160 mg by mouth daily  Yes Historical Provider, MD   fluticasone-salmeterol (ADVAIR) 250-50 mcg/dose inhaler Inhale 1 puff every 12 (twelve) hours     Yes Historical Provider, MD   glucose blood test strip 1 each by Other route as needed Use as instructed   Yes Historical Provider, MD   hydrOXYzine pamoate (VISTARIL) 50 mg capsule Take 50 mg by mouth daily at bedtime  Yes Historical Provider, MD   insulin aspart (NovoLOG) 100 units/mL injection Inject 14 Units under the skin 2 (two) times a day before breakfast and lunch     Yes Historical Provider, MD   insulin aspart (NovoLOG) 100 units/mL injection Inject 16 Units under the skin daily with dinner     Yes Historical Provider, MD   insulin glargine (LANTUS) 100 units/mL subcutaneous injection Inject 44 Units under the skin daily at bedtime     Yes Historical Provider, MD   Liraglutide (VICTOZA SC) Inject 12 Units under the skin daily with breakfast   Yes Historical Provider, MD   lisinopril (ZESTRIL) 20 mg tablet Take 20 mg by mouth daily   Yes Historical Provider, MD   lovastatin (MEVACOR) 40 MG tablet Take 40 mg by mouth daily at bedtime  Yes Historical Provider, MD   metFORMIN (GLUCOPHAGE) 1000 MG tablet Take 500 mg by mouth daily at bedtime     Yes Historical Provider, MD   metoprolol tartrate (LOPRESSOR) 25 mg tablet Take 25 mg by mouth every 12 (twelve) hours   Yes Historical Provider, MD   mirtazapine (REMERON) 30 mg tablet Take 30 mg by mouth daily at bedtime   Yes Historical Provider, MD   omeprazole (PriLOSEC) 20 mg delayed release capsule Take 20 mg by mouth every evening   Yes Historical Provider, MD   pregabalin (LYRICA) 50 mg capsule Take 50 mg by mouth 3 (three) times a day  Yes Historical Provider, MD   QUEtiapine (SEROquel XR) 400 mg 24 hr tablet Take 800 mg by mouth daily at bedtime  Yes Historical Provider, MD   venlafaxine [facility]) 75 mg tablet Take 150 mg by mouth every morning     Yes Historical Provider, MD       Allergies   Allergen Reactions    Wellbutrin [Bupropion] Other (See Comments)     Alteration with hearing       Objective   Vitals: Blood pressure 131/72, pulse 84, temperature 97 9 °F (36 6 °C), temperature source Tympanic, resp   rate 20, height 5' 11" (1 803 m), weight 126 kg (278 lb 14 1 oz), SpO2 92 %  ,Body mass index is 38 9 kg/m²  No intake or output data in the 24 hours ending 11/07/17 1653  Invasive Devices     Peripheral Intravenous Line            Peripheral IV 11/06/17 Left Hand 1 day                Physical Exam   Constitutional: He is oriented to person, place, and time  Patient is obese, awake, alert, no acute distress  HENT:   Head: Normocephalic  Nose: Nose normal    Mouth/Throat: Oropharynx is clear and moist  No oropharyngeal exudate  Eyes: Conjunctivae are normal    Neck: Neck supple  No JVD present  Cardiovascular: Normal rate, regular rhythm and normal heart sounds  Pulmonary/Chest: Effort normal    There is some expiratory wheezes in the lower lung zones  Breath sounds are diminished   Abdominal: Soft  Obese soft nontender   Musculoskeletal:   There is some minimal edema of lower tibial regions   Lymphadenopathy:     He has no cervical adenopathy  Neurological: He is alert and oriented to person, place, and time  Skin: Skin is warm and dry  Psychiatric: He has a normal mood and affect   His behavior is normal        Lab Results: ABG:   Lab Results   Component Value Date    PHART 7 422 03/22/2017    PDG0DJW 35 7 (L) 03/22/2017    PO2ART 77 7 03/22/2017    KIN3DAX 22 7 03/22/2017    BEART -1 2 03/22/2017    SOURCE Brachial, Left 03/22/2017   , BNP: No results found for: BNP, CBC:   Lab Results   Component Value Date    WBC 11 60 (H) 11/07/2017    HGB 14 4 11/07/2017    HCT 42 5 11/07/2017    MCV 91 11/07/2017     11/07/2017    ADJUSTEDWBC 8 60 09/14/2016    MCH 31 0 11/07/2017    MCHC 33 9 11/07/2017    RDW 13 3 11/07/2017    MPV 7 9 (L) 11/07/2017    NRBC 0 11/06/2017   , CMP:   Lab Results   Component Value Date     (L) 11/07/2017    K 4 7 11/07/2017    CL 92 (L) 11/07/2017    CO2 25 11/07/2017    ANIONGAP 14 (H) 11/07/2017    BUN 26 (H) 11/07/2017    CREATININE 1 06 11/07/2017    GLUCOSE 321 (H) 11/07/2017 CALCIUM 9 8 11/07/2017    AST 30 09/06/2017    ALT 48 09/06/2017    ALKPHOS 64 09/06/2017    PROT 7 7 09/06/2017    ALBUMIN 4 5 09/06/2017    BILITOT 0 50 09/06/2017    EGFR 77 11/07/2017   , PT/INR:   Lab Results   Component Value Date    INR 0 95 03/20/2017   , Troponin: No components found for: TROPONIN    Imaging Studies: I have personally reviewed pertinent reports  and I have personally reviewed pertinent films in PACS    EKG, Pathology, and Other Studies: I have personally reviewed pertinent reports  VTE Prophylaxis: Enoxaparin (Lovenox)    Code Status: Level 1 - Full Code    Counseling/Coordination of Care: Total floor / unit time spent today 30 minutes  Greater than 50% of total time was spent with the patient and / or family counseling and / or coordination of care   A description of the counseling / coordination of care: I reviewed patient's chart, chest radiograph, and discussed diagnosis and treatment with him

## 2017-11-08 LAB
ANION GAP SERPL CALCULATED.3IONS-SCNC: 12 MMOL/L (ref 4–13)
BACTERIA SPT RESP CULT: NORMAL
BUN SERPL-MCNC: 24 MG/DL (ref 5–25)
CALCIUM SERPL-MCNC: 9.4 MG/DL (ref 8.3–10.1)
CHLORIDE SERPL-SCNC: 93 MMOL/L (ref 100–108)
CO2 SERPL-SCNC: 25 MMOL/L (ref 21–32)
CREAT SERPL-MCNC: 0.97 MG/DL (ref 0.6–1.3)
ERYTHROCYTE [DISTWIDTH] IN BLOOD BY AUTOMATED COUNT: 13.4 % (ref 11.6–15.1)
GFR SERPL CREATININE-BSD FRML MDRD: 86 ML/MIN/1.73SQ M
GLUCOSE SERPL-MCNC: 271 MG/DL (ref 65–140)
GLUCOSE SERPL-MCNC: 319 MG/DL (ref 65–140)
GLUCOSE SERPL-MCNC: 350 MG/DL (ref 65–140)
GLUCOSE SERPL-MCNC: 357 MG/DL (ref 65–140)
GLUCOSE SERPL-MCNC: 433 MG/DL (ref 65–140)
GRAM STN SPEC: NORMAL
HCT VFR BLD AUTO: 44 % (ref 42–52)
HGB BLD-MCNC: 14.7 G/DL (ref 14–18)
MCH RBC QN AUTO: 31 PG (ref 27–31)
MCHC RBC AUTO-ENTMCNC: 33.5 G/DL (ref 31.4–37.4)
MCV RBC AUTO: 93 FL (ref 82–98)
MRSA NOSE QL CULT: NORMAL
PLATELET # BLD AUTO: 186 THOUSANDS/UL (ref 130–400)
PMV BLD AUTO: 8.1 FL (ref 8.9–12.7)
POTASSIUM SERPL-SCNC: 4.6 MMOL/L (ref 3.5–5.3)
RBC # BLD AUTO: 4.74 MILLION/UL (ref 4.7–6.1)
SODIUM SERPL-SCNC: 130 MMOL/L (ref 136–145)
WBC # BLD AUTO: 11 THOUSAND/UL (ref 4.8–10.8)

## 2017-11-08 PROCEDURE — 87205 SMEAR GRAM STAIN: CPT | Performed by: FAMILY MEDICINE

## 2017-11-08 PROCEDURE — 80048 BASIC METABOLIC PNL TOTAL CA: CPT | Performed by: FAMILY MEDICINE

## 2017-11-08 PROCEDURE — 94640 AIRWAY INHALATION TREATMENT: CPT

## 2017-11-08 PROCEDURE — 94760 N-INVAS EAR/PLS OXIMETRY 1: CPT

## 2017-11-08 PROCEDURE — 82948 REAGENT STRIP/BLOOD GLUCOSE: CPT

## 2017-11-08 PROCEDURE — 85027 COMPLETE CBC AUTOMATED: CPT | Performed by: FAMILY MEDICINE

## 2017-11-08 RX ORDER — INSULIN GLARGINE 100 [IU]/ML
55 INJECTION, SOLUTION SUBCUTANEOUS
Status: DISCONTINUED | OUTPATIENT
Start: 2017-11-08 | End: 2017-11-09

## 2017-11-08 RX ADMIN — AMLODIPINE BESYLATE 5 MG: 5 TABLET ORAL at 17:46

## 2017-11-08 RX ADMIN — LISINOPRIL 20 MG: 20 TABLET ORAL at 08:59

## 2017-11-08 RX ADMIN — IPRATROPIUM BROMIDE AND ALBUTEROL SULFATE 3 ML: .5; 3 SOLUTION RESPIRATORY (INHALATION) at 14:29

## 2017-11-08 RX ADMIN — FLUTICASONE PROPIONATE AND SALMETEROL 1 PUFF: 50; 250 POWDER RESPIRATORY (INHALATION) at 20:59

## 2017-11-08 RX ADMIN — METOPROLOL TARTRATE 25 MG: 25 TABLET ORAL at 08:58

## 2017-11-08 RX ADMIN — PANTOPRAZOLE SODIUM 40 MG: 40 TABLET, DELAYED RELEASE ORAL at 06:06

## 2017-11-08 RX ADMIN — IPRATROPIUM BROMIDE AND ALBUTEROL SULFATE 3 ML: .5; 3 SOLUTION RESPIRATORY (INHALATION) at 01:09

## 2017-11-08 RX ADMIN — VENLAFAXINE HYDROCHLORIDE 150 MG: 150 CAPSULE, EXTENDED RELEASE ORAL at 08:59

## 2017-11-08 RX ADMIN — HYDROXYZINE HYDROCHLORIDE 50 MG: 25 TABLET, FILM COATED ORAL at 21:46

## 2017-11-08 RX ADMIN — METHYLPREDNISOLONE SODIUM SUCCINATE 40 MG: 40 INJECTION, POWDER, FOR SOLUTION INTRAMUSCULAR; INTRAVENOUS at 21:42

## 2017-11-08 RX ADMIN — METHYLPREDNISOLONE SODIUM SUCCINATE 40 MG: 40 INJECTION, POWDER, FOR SOLUTION INTRAMUSCULAR; INTRAVENOUS at 14:35

## 2017-11-08 RX ADMIN — MIRTAZAPINE 30 MG: 15 TABLET, FILM COATED ORAL at 21:46

## 2017-11-08 RX ADMIN — ASPIRIN 81 MG 81 MG: 81 TABLET ORAL at 08:58

## 2017-11-08 RX ADMIN — FLUTICASONE PROPIONATE AND SALMETEROL 1 PUFF: 50; 250 POWDER RESPIRATORY (INHALATION) at 09:00

## 2017-11-08 RX ADMIN — METOPROLOL TARTRATE 25 MG: 25 TABLET ORAL at 20:59

## 2017-11-08 RX ADMIN — INSULIN LISPRO 6 UNITS: 100 INJECTION, SOLUTION INTRAVENOUS; SUBCUTANEOUS at 12:18

## 2017-11-08 RX ADMIN — AZITHROMYCIN 500 MG: 250 TABLET, FILM COATED ORAL at 17:46

## 2017-11-08 RX ADMIN — INSULIN LISPRO 14 UNITS: 100 INJECTION, SOLUTION INTRAVENOUS; SUBCUTANEOUS at 09:00

## 2017-11-08 RX ADMIN — INSULIN LISPRO 5 UNITS: 100 INJECTION, SOLUTION INTRAVENOUS; SUBCUTANEOUS at 08:59

## 2017-11-08 RX ADMIN — METHYLPREDNISOLONE SODIUM SUCCINATE 40 MG: 40 INJECTION, POWDER, FOR SOLUTION INTRAMUSCULAR; INTRAVENOUS at 06:06

## 2017-11-08 RX ADMIN — IPRATROPIUM BROMIDE AND ALBUTEROL SULFATE 3 ML: .5; 3 SOLUTION RESPIRATORY (INHALATION) at 20:17

## 2017-11-08 RX ADMIN — PREGABALIN 50 MG: 50 CAPSULE ORAL at 17:47

## 2017-11-08 RX ADMIN — PREGABALIN 50 MG: 50 CAPSULE ORAL at 20:59

## 2017-11-08 RX ADMIN — QUETIAPINE 800 MG: 300 TABLET, EXTENDED RELEASE ORAL at 21:46

## 2017-11-08 RX ADMIN — INSULIN LISPRO 3 UNITS: 100 INJECTION, SOLUTION INTRAVENOUS; SUBCUTANEOUS at 21:41

## 2017-11-08 RX ADMIN — FENOFIBRATE 168 MG: 48 TABLET ORAL at 08:56

## 2017-11-08 RX ADMIN — METFORMIN HYDROCHLORIDE 500 MG: 500 TABLET, FILM COATED ORAL at 21:46

## 2017-11-08 RX ADMIN — PREGABALIN 50 MG: 50 CAPSULE ORAL at 08:58

## 2017-11-08 RX ADMIN — PRAVASTATIN SODIUM 40 MG: 40 TABLET ORAL at 17:46

## 2017-11-08 RX ADMIN — INSULIN LISPRO 4 UNITS: 100 INJECTION, SOLUTION INTRAVENOUS; SUBCUTANEOUS at 17:47

## 2017-11-08 RX ADMIN — IPRATROPIUM BROMIDE AND ALBUTEROL SULFATE 3 ML: .5; 3 SOLUTION RESPIRATORY (INHALATION) at 08:33

## 2017-11-08 RX ADMIN — INSULIN LISPRO 14 UNITS: 100 INJECTION, SOLUTION INTRAVENOUS; SUBCUTANEOUS at 12:18

## 2017-11-08 RX ADMIN — AMLODIPINE BESYLATE 5 MG: 5 TABLET ORAL at 08:59

## 2017-11-08 RX ADMIN — INSULIN GLARGINE 55 UNITS: 100 INJECTION, SOLUTION SUBCUTANEOUS at 21:42

## 2017-11-08 RX ADMIN — ENOXAPARIN SODIUM 40 MG: 40 INJECTION SUBCUTANEOUS at 08:58

## 2017-11-08 RX ADMIN — ALPRAZOLAM 2 MG: 0.5 TABLET ORAL at 21:46

## 2017-11-08 NOTE — PROGRESS NOTES
[facility] Progress Note  Patient: Vanessa De Leon 61 y o  male   MRN: [id number]  PCP: Griselda Ravel, MD  Unit/Bed#: [address] [place] [address] Encounter: [id number]  Date Of Visit: 11/08/17    Problem List:    Principal Problem:    COPD exacerbation ([facility] )  Active Problems:    Bipolar affective ([facility] )    Hypertension    Diabetes mellitus ([facility] )    Acute bronchitis    Left-sided chest wall pain      Assessment & Plan:    1  Acute COPD exacerbation - improving  Decreased dose of IV Solu-Medrol  Continue nebs treatments  Continue advair  2  Acute bronchitis - continue zithromax  No obvious pneumonia on chest x-ray  3  Under control insulin dependent diabetes - sugars are not well controlled partially due to steroids  Continue metformin, sliding scale insulin  Increased dose of humalog with meals, lantus  HbA1c of 9 7  4  Left-sided chest wall pain -most likely related to coughing  Troponins negative  Patient has had recent cardiac catheterization which was within normal limits as per him  5  Bipolar affective disorder-continue Remeron , Seroquel XR, Effexor XR  6  Essential HTN - continue lisinopril, norvasc, Lopressor  7  Dyslipidemia - continue tricor, statin   8  SHAVONNE - continue home CPAP  9  Hyponatremia-most likely pseudohyponatremia from hyperglycemia  Corrected sodium is ~ 135      VTE Pharmacologic Prophylaxis:   Pharmacologic: Enoxaparin (Lovenox)  Mechanical VTE Prophylaxis in Place: Yes    Patient Centered Rounds: I have performed bedside rounds with nursing staff today  Discussions with Specialists or Other Care Team Provider: Yes    Education and Discussions with Family / Patient:Yes    Time Spent for Care: 35 min  More than 50% of total time spent on counseling and coordination of care as described above      Current Length of Stay: 2 day(s)    Current Patient Status: Inpatient     Discharge Plan: home    Code Status: Level 1 - Full Code      Subjective:     Breathing and cough is better    Objective:     Vitals:   Temp (24hrs), Av 6 °F (36 4 °C), Min:97 5 °F (36 4 °C), Max:97 9 °F (36 6 °C)    HR:  [73-96] 96  Resp:  [19-20] 19  BP: (116-151)/(55-77) 151/75  SpO2:  [91 %-95 %] 91 %  Body mass index is 38 9 kg/m²  Input and Output Summary (last 24 hours): Intake/Output Summary (Last 24 hours) at 17 1432  Last data filed at 17 1221   Gross per 24 hour   Intake             1300 ml   Output                0 ml   Net             1300 ml       Physical Exam:     Physical Exam   Constitutional: He is oriented to person, place, and time  He appears well-developed and well-nourished  No distress  HENT:   Head: Normocephalic and atraumatic  Mouth/Throat: Oropharynx is clear and moist    Eyes: Conjunctivae are normal  No scleral icterus  Neck: Neck supple  No tracheal deviation present  Cardiovascular: Normal rate and regular rhythm  Pulmonary/Chest: Effort normal  No respiratory distress  He has wheezes (intermittent)  He has no rales  Decreased breath sounds B/L   Abdominal: Soft  Bowel sounds are normal  He exhibits no distension  There is no tenderness  Musculoskeletal: He exhibits edema (trace B/L L  E)  Neurological: He is alert and oriented to person, place, and time  He has normal strength  No cranial nerve deficit  Skin: Skin is dry  He is not diaphoretic  Psychiatric: He has a normal mood and affect  Additional Data:     Labs:      Results from last 7 days  Lab Units 17  0557  17  0927   WBC Thousand/uL 11 00*  < > 6 50   HEMOGLOBIN g/dL 14 7  < > 14 3   HEMATOCRIT % 44 0  < > 41 9*   PLATELETS Thousands/uL 186  < > 179   NEUTROS PCT %  --   --  57   LYMPHS PCT %  --   --  35   MONOS PCT %  --   --  7   EOS PCT %  --   --  1   < > = values in this interval not displayed      Results from last 7 days  Lab Units 17  0557   SODIUM mmol/L 130*   POTASSIUM mmol/L 4 6   CHLORIDE mmol/L 93*   CO2 mmol/L 25   BUN mg/dL 24 CREATININE mg/dL 0 97   CALCIUM mg/dL 9 4   GLUCOSE RANDOM mg/dL 357*           * I Have Reviewed All Lab Data Listed Above  * Additional Pertinent Lab Tests Reviewed: [facility] 65 Admission Reviewed    Imaging:  Xr Chest 2 Views    Result Date: 11/6/2017  Narrative: CHEST - DUAL ENERGY INDICATION:  Shortness of breath, cough for one week  Weakness  COMPARISON:  September 6, 2017 VIEWS:  PA (including soft tissue/bone algorithms) and lateral projections IMAGES:  4 FINDINGS:     Stable cardiomegaly  Linear airspace opacities right lung base stable from prior study may represents atelectasis or scarring  Visualized osseous structures appear within normal limits for the patient's age  Impression: Linear airspace abnormality at the right lung base similar to prior study may represent scarring or atelectasis  Stable cardio megaly  Workstation performed: [id number]     Imaging Reports Reviewed by myself    Cultures:   Blood Culture:   Lab Results   Component Value Date    BLOODCX No Growth After 5 Days  09/06/2017    BLOODCX No Growth After 5 Days  09/06/2017    BLOODCX Diphtheroids 03/20/2017    BLOODCX No Growth After 5 Days  03/20/2017    BLOODCX No Growth After 5 Days  01/12/2016    BLOODCX No Growth After 5 Days   01/12/2016     Urine Culture:   Lab Results   Component Value Date    URINECX 7539-4253 cfu/ml Mixed Contaminants X2 03/20/2017    URINECX No Growth <1000 cfu/mL 11/27/2016    URINECX No Growth <1000 cfu/mL 09/02/2016     Sputum Culture: No components found for: SPUTUMCX  Wound Culture: No results found for: WOUNDCULT    Last 24 Hours Medication List:     amLODIPine 5 mg Oral BID   aspirin 81 mg Oral Daily   azithromycin 500 mg Oral Q24H   Cariprazine HCl 4 5 mg Oral HS   enoxaparin 40 mg Subcutaneous Daily   fenofibrate 168 mg Oral Daily   fluticasone-salmeterol 1 puff Inhalation Q12H [facility]   hydrOXYzine HCL 50 mg Oral HS   insulin glargine 50 Units Subcutaneous HS   insulin lispro 1-5 Units Subcutaneous HS   insulin lispro 1-6 Units Subcutaneous TID AC   [START ON 11/9/2017] insulin lispro 18 Units Subcutaneous BID before breakfast/lunch   insulin lispro 20 Units Subcutaneous Daily With Dinner   ipratropium-albuterol 3 mL Nebulization Q6H   lisinopril 20 mg Oral Daily   metFORMIN 500 mg Oral HS   methylPREDNISolone sodium succinate 40 mg Intravenous Q8H [facility] & long-term   metoprolol tartrate 25 mg Oral Q12H LELAND   mirtazapine 30 mg Oral HS   pantoprazole 40 mg Oral Early Morning   pravastatin 40 mg Oral Daily With Dinner   pregabalin 50 mg Oral TID   QUEtiapine 800 mg Oral HS   venlafaxine 150 mg Oral Daily        Today, Patient Was Seen By: Se Jenkins DO    ** Please Note: Dragon 360 Dictation voice to text software may have been used in the creation of this document   **

## 2017-11-08 NOTE — PROGRESS NOTES
Progress Note - Pulmonary   Sonali Romeo 61 y o  male MRN: [id number]  Unit/Bed#: [address] [place] [address] Encounter: [id number]    Assessment:  Acute bronchitis with exacerbation of underlying chronic bronchitis  Mild SHAVONNE  Alveolar hypoventilation secondary obesity    Plan:  Continue methylprednisone 40 mg IV every 8 hours and if patient is doing better tomorrow can decrease dose  Continue neb treatments with DuoNeb q 6 hours and p o  azithromycin      Subjective:   Patient feels better today  Cough is improved    Objective:     Vitals: Blood pressure 121/73, pulse 74, temperature 97 8 °F (36 6 °C), temperature source Tympanic, resp  rate 18, height 5' 11" (1 803 m), weight 126 kg (278 lb 14 1 oz), SpO2 96 %  ,Body mass index is 38 9 kg/m²  Intake/Output Summary (Last 24 hours) at 11/08/17 1816  Last data filed at 11/08/17 1752   Gross per 24 hour   Intake             1900 ml   Output                0 ml   Net             1900 ml       Physical Exam: Physical Exam   Constitutional: He is oriented to person, place, and time  Patient is awake, sitting in chair  Room air O2 saturation is 91 and 92%   HENT:   Head: Normocephalic  Nose: Nose normal    Mouth/Throat: Oropharynx is clear and moist    Eyes: Conjunctivae are normal    Neck: Neck supple  No JVD present  Cardiovascular: Normal rate, regular rhythm and normal heart sounds  Pulmonary/Chest: Effort normal    Lung sounds are decreased with few rhonchi in lower lobes   Abdominal: Soft  Obese, nontender, no guarding   Musculoskeletal:   Trace edema   Lymphadenopathy:     He has no cervical adenopathy  Neurological: He is alert and oriented to person, place, and time  Skin: Skin is warm and dry  No erythema  Psychiatric: He has a normal mood and affect  His behavior is normal  Thought content normal         Labs: I have personally reviewed pertinent lab results  , ABG: Lab Results   Component Value Date    PHART 7 422 03/22/2017    RQJ6WRB 35 7 (L) 03/22/2017    PO2ART 77 7 03/22/2017    BCS7RYM 22 7 03/22/2017    BEART -1 2 03/22/2017    SOURCE Brachial, Left 03/22/2017   , BNP: No results found for: BNP, CBC: Lab Results   Component Value Date    WBC 11 00 (H) 11/08/2017    HGB 14 7 11/08/2017    HCT 44 0 11/08/2017    MCV 93 11/08/2017     11/08/2017    ADJUSTEDWBC 8 60 09/14/2016    MCH 31 0 11/08/2017    MCHC 33 5 11/08/2017    RDW 13 4 11/08/2017    MPV 8 1 (L) 11/08/2017    NRBC 0 11/06/2017   , CMP: Lab Results   Component Value Date     (L) 11/08/2017    K 4 6 11/08/2017    CL 93 (L) 11/08/2017    CO2 25 11/08/2017    ANIONGAP 12 11/08/2017    BUN 24 11/08/2017    CREATININE 0 97 11/08/2017    GLUCOSE 357 (H) 11/08/2017    CALCIUM 9 4 11/08/2017    AST 30 09/06/2017    ALT 48 09/06/2017    ALKPHOS 64 09/06/2017    PROT 7 7 09/06/2017    ALBUMIN 4 5 09/06/2017    BILITOT 0 50 09/06/2017    EGFR 86 11/08/2017   , PT/INR:   Lab Results   Component Value Date    INR 0 95 03/20/2017   , Troponin: No components found for: TROPONIN    Imaging and other studies: I have personally reviewed pertinent reports     and I have personally reviewed pertinent films in PACS

## 2017-11-08 NOTE — NUTRITION
11/08/17 1345   Assessment   Timepoint Initial  (A1c: 9 7)   Labs   List Completed Labs (POC: [id number]  Na: 130, glucose: 357  insulin, metformin, methylprednisolone)   Feeding Route   PO Independent   Adequacy of Intake   Nutrition Modality PO  (Cardiac Step 1, CCD2)   Intake Meals %   Estimated Calorie Intake %   Estimated Protein Intake  %   Estimated Fluid Intake %   Estimated calorie intake compared to estimated need Pt with good po intakes, potential to continue with same   Nutrition Prognosis   Potential Fair   Nutrition Concerns Blood sugar control  (trace B/L LE edema)   Comorbid Concerns None   Nutrition Precautions None   Nutrition Considerations (Provide written and verbal DM education  Reviewed sources of carbs, portion sizing  Discussed outpatient MNT  Pt understanding of information provided   Will educate PRN)   PES Statement   Problem Clinical   Biochemical (2) Altered nutrition-related laboratory values (specify) NC-2 2   Related to Other (comment)  (DM)   As evidenced by: Abnormal lab (specify)  (elevated A1c)   Patient Nutrition Goals   Goal weight maintained;meet PO needs   Goal Status initiated   Timeframe to complete goal by next f/u   Recommendations/Interventions   Interventions Diet: continued as ordered;Education initiated/completed   Nutrition Recommendations Continue diet as ordered   Nutrition Complexity Risk   Nutrition complexity level Low risk   Nutrition review: 11/14/17  (po intakes)   Follow up date 11/18/17

## 2017-11-08 NOTE — PLAN OF CARE
CARDIOVASCULAR - ADULT     Maintains optimal cardiac output and hemodynamic stability Progressing     Absence of cardiac dysrhythmias or at baseline rhythm Progressing        DISCHARGE PLANNING - CARE MANAGEMENT     Discharge to post-acute care or home with appropriate resources Progressing        METABOLIC, FLUID AND ELECTROLYTES - ADULT     Glucose maintained within target range Progressing        Potential for Falls     Patient will remain free of falls Progressing        RESPIRATORY - ADULT     Achieves optimal ventilation and oxygenation Progressing

## 2017-11-08 NOTE — PROGRESS NOTES
[facility] Progress Note  Patient: Erna Sarah 61 y o  male   MRN: [id number]  PCP: Karolina Castro MD  Unit/Bed#: [address] [place], [place] [address] Encounter: [id number]  Date Of Visit: 11/07/17    Problem List:    Principal Problem:    COPD exacerbation ([facility] )  Active Problems:    Bipolar affective ([facility] )    Hypertension    Diabetes mellitus ([facility] )    Acute bronchitis    Left-sided chest wall pain      Assessment & Plan:    1  Acute COPD exacerbation -continue current dose of IV Solu-Medrol  Continue nebulizer treatments  If patient continues To show improvement, will decrease dose of IV Solu-Medrol tomorrow  2  Acute bronchitis -no obvious pneumonia on chest x-ray  Continue patient on Zithromax  3  Left-sided chest wall pain -most likely related to coughing  Troponins negative  Patient has had recent cardiac catheterization which was within normal limits as per him  4  Uncontrolled insulin-dependent diabetes -sugars are not well controlled partly due to steroids   Continue metformin, Humalog with meals, sliding scale insulin  Dose of lantus increased  Hemoglobin A1c of 9 7  5  Bipolar affective disorder -continue Effexor XR, Seroquel XR, Remeron  6  Essential hypertension -continue Lopressor, Norvasc, lisinopril  7  Dyslipidemia -continue statin, Tricor  8  SHAVONNE -continue home CPAP  9  Hyponatremia - most likely pseudohyponatremia from hyperglycemia      VTE Pharmacologic Prophylaxis:   Pharmacologic: Enoxaparin (Lovenox)  Mechanical VTE Prophylaxis in Place: Yes    Patient Centered Rounds: I have performed bedside rounds with nursing staff today  Discussions with Specialists or Other Care Team Provider: Yes    Education and Discussions with Family / Patient:Yes    Time Spent for Care: 30 minutes  More than 50% of total time spent on counseling and coordination of care as described above      Current Length of Stay: 1 day(s)    Current Patient Status: Inpatient     Discharge Plan: home    Code Status: Level 1 - Full Code      Subjective:   States breathing feels better today    Objective:     Vitals:   Temp (24hrs), Av 6 °F (36 4 °C), Min:96 7 °F (35 9 °C), Max:98 4 °F (36 9 °C)    HR:  [80-88] 84  Resp:  [20-21] 20  BP: (117-152)/(63-72) 131/72  SpO2:  [91 %-95 %] 92 %  Body mass index is 38 9 kg/m²  Input and Output Summary (last 24 hours):     No intake or output data in the 24 hours ending 17    Physical Exam:     Physical Exam   Constitutional: He is oriented to person, place, and time  He appears well-developed and well-nourished  No distress  HENT:   Head: Normocephalic and atraumatic  Eyes: EOM are normal  No scleral icterus  Neck: Neck supple  Cardiovascular: Normal rate and regular rhythm  Pulmonary/Chest: Effort normal  No respiratory distress  He has wheezes (intermittent)  He has no rales  Decreased breath sounds B/L   Abdominal: Soft  Bowel sounds are normal  He exhibits no distension  There is no tenderness  Musculoskeletal: He exhibits edema (trace B/L L  E)  Neurological: He is alert and oriented to person, place, and time  He has normal strength  No cranial nerve deficit  Additional Data:     Labs:      Results from last 7 days  Lab Units 17  0927   WBC Thousand/uL 11 60* 6 50   HEMOGLOBIN g/dL 14 4 14 3   HEMATOCRIT % 42 5 41 9*   PLATELETS Thousands/uL 198 179   NEUTROS PCT %  --  57   LYMPHS PCT %  --  35   MONOS PCT %  --  7   EOS PCT %  --  1       Results from last 7 days  Lab Units 17  0623   SODIUM mmol/L 131*   POTASSIUM mmol/L 4 7   CHLORIDE mmol/L 92*   CO2 mmol/L 25   BUN mg/dL 26*   CREATININE mg/dL 1 06   CALCIUM mg/dL 9 8   GLUCOSE RANDOM mg/dL 321*           * I Have Reviewed All Lab Data Listed Above  * Additional Pertinent Lab Tests Reviewed:  All Labs For Current Hospital Admission Reviewed    Imaging:  Xr Chest 2 Views    Result Date: 2017  Narrative: CHEST - DUAL ENERGY INDICATION:  Shortness of breath, cough for one week  Weakness  COMPARISON:  September 6, 2017 VIEWS:  PA (including soft tissue/bone algorithms) and lateral projections IMAGES:  4 FINDINGS:     Stable cardiomegaly  Linear airspace opacities right lung base stable from prior study may represents atelectasis or scarring  Visualized osseous structures appear within normal limits for the patient's age  Impression: Linear airspace abnormality at the right lung base similar to prior study may represent scarring or atelectasis  Stable cardio megaly  Workstation performed: [id number]     Imaging Reports Reviewed by myself    Cultures:   Blood Culture:   Lab Results   Component Value Date    BLOODCX No Growth After 5 Days  09/06/2017    BLOODCX No Growth After 5 Days  09/06/2017    BLOODCX Diphtheroids 03/20/2017    BLOODCX No Growth After 5 Days  03/20/2017    BLOODCX No Growth After 5 Days  01/12/2016    BLOODCX No Growth After 5 Days   01/12/2016     Urine Culture:   Lab Results   Component Value Date    URINECX 3386-0300 cfu/ml Mixed Contaminants X2 03/20/2017    URINECX No Growth <1000 cfu/mL 11/27/2016    URINECX No Growth <1000 cfu/mL 09/02/2016     Sputum Culture: No components found for: SPUTUMCX  Wound Culture: No results found for: WOUNDCULT    Last 24 Hours Medication List:     amLODIPine 5 mg Oral BID   aspirin 81 mg Oral Daily   azithromycin 500 mg Oral Q24H   Cariprazine HCl 4 5 mg Oral HS   enoxaparin 40 mg Subcutaneous Daily   fenofibrate 168 mg Oral Daily   fluticasone-salmeterol 1 puff Inhalation Q12H LELAND   hydrOXYzine HCL 50 mg Oral HS   insulin glargine 44 Units Subcutaneous HS   insulin lispro 1-5 Units Subcutaneous HS   insulin lispro 1-6 Units Subcutaneous TID AC   insulin lispro 14 Units Subcutaneous BID before breakfast/lunch   insulin lispro 16 Units Subcutaneous Daily With Dinner   ipratropium-albuterol 3 mL Nebulization Q6H   lisinopril 20 mg Oral Daily   metFORMIN 500 mg Oral HS   methylPREDNISolone sodium succinate 40 mg Intravenous Q8H [address]   metoprolol tartrate 25 mg Oral Q12H LELAND   mirtazapine 30 mg Oral HS   pantoprazole 40 mg Oral Early Morning   pravastatin 40 mg Oral Daily With Dinner   pregabalin 50 mg Oral TID   QUEtiapine 800 mg Oral HS   venlafaxine 150 mg Oral Daily        Today, Patient Was Seen By: Kayden De DO    ** Please Note: Dragon 360 Dictation voice to text software may have been used in the creation of this document   **
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
## 2017-11-09 LAB
ANION GAP SERPL CALCULATED.3IONS-SCNC: 11 MMOL/L (ref 4–13)
ATRIAL RATE: 96 BPM
ATRIAL RATE: 98 BPM
BUN SERPL-MCNC: 22 MG/DL (ref 5–25)
CALCIUM SERPL-MCNC: 9.5 MG/DL (ref 8.3–10.1)
CHLORIDE SERPL-SCNC: 93 MMOL/L (ref 100–108)
CO2 SERPL-SCNC: 26 MMOL/L (ref 21–32)
CREAT SERPL-MCNC: 0.96 MG/DL (ref 0.6–1.3)
GFR SERPL CREATININE-BSD FRML MDRD: 87 ML/MIN/1.73SQ M
GLUCOSE SERPL-MCNC: 281 MG/DL (ref 65–140)
GLUCOSE SERPL-MCNC: 314 MG/DL (ref 65–140)
GLUCOSE SERPL-MCNC: 357 MG/DL (ref 65–140)
GLUCOSE SERPL-MCNC: 383 MG/DL (ref 65–140)
GLUCOSE SERPL-MCNC: 418 MG/DL (ref 65–140)
P AXIS: 42 DEGREES
P AXIS: 49 DEGREES
POTASSIUM SERPL-SCNC: 4.6 MMOL/L (ref 3.5–5.3)
PR INTERVAL: 144 MS
PR INTERVAL: 146 MS
QRS AXIS: 66 DEGREES
QRS AXIS: 67 DEGREES
QRSD INTERVAL: 92 MS
QRSD INTERVAL: 94 MS
QT INTERVAL: 344 MS
QT INTERVAL: 346 MS
QTC INTERVAL: 437 MS
QTC INTERVAL: 439 MS
SODIUM SERPL-SCNC: 130 MMOL/L (ref 136–145)
T WAVE AXIS: 50 DEGREES
T WAVE AXIS: 55 DEGREES
VENTRICULAR RATE: 96 BPM
VENTRICULAR RATE: 98 BPM

## 2017-11-09 PROCEDURE — 94760 N-INVAS EAR/PLS OXIMETRY 1: CPT

## 2017-11-09 PROCEDURE — 82948 REAGENT STRIP/BLOOD GLUCOSE: CPT

## 2017-11-09 PROCEDURE — 80048 BASIC METABOLIC PNL TOTAL CA: CPT | Performed by: FAMILY MEDICINE

## 2017-11-09 PROCEDURE — 94640 AIRWAY INHALATION TREATMENT: CPT

## 2017-11-09 RX ORDER — INSULIN GLARGINE 100 [IU]/ML
60 INJECTION, SOLUTION SUBCUTANEOUS
Status: DISCONTINUED | OUTPATIENT
Start: 2017-11-09 | End: 2017-11-10 | Stop reason: HOSPADM

## 2017-11-09 RX ORDER — METHYLPREDNISOLONE SODIUM SUCCINATE 40 MG/ML
20 INJECTION, POWDER, LYOPHILIZED, FOR SOLUTION INTRAMUSCULAR; INTRAVENOUS EVERY 8 HOURS SCHEDULED
Status: DISCONTINUED | OUTPATIENT
Start: 2017-11-09 | End: 2017-11-10 | Stop reason: HOSPADM

## 2017-11-09 RX ADMIN — HYDROCODONE POLISTIREX AND CHLORPHENIRAMINE POLISTIREX 5 ML: 10; 8 SUSPENSION, EXTENDED RELEASE ORAL at 20:40

## 2017-11-09 RX ADMIN — MIRTAZAPINE 30 MG: 15 TABLET, FILM COATED ORAL at 22:00

## 2017-11-09 RX ADMIN — INSULIN LISPRO 2 UNITS: 100 INJECTION, SOLUTION INTRAVENOUS; SUBCUTANEOUS at 21:59

## 2017-11-09 RX ADMIN — AMLODIPINE BESYLATE 5 MG: 5 TABLET ORAL at 17:16

## 2017-11-09 RX ADMIN — PREGABALIN 50 MG: 50 CAPSULE ORAL at 20:39

## 2017-11-09 RX ADMIN — VENLAFAXINE HYDROCHLORIDE 150 MG: 150 CAPSULE, EXTENDED RELEASE ORAL at 08:39

## 2017-11-09 RX ADMIN — INSULIN LISPRO 6 UNITS: 100 INJECTION, SOLUTION INTRAVENOUS; SUBCUTANEOUS at 17:55

## 2017-11-09 RX ADMIN — PREGABALIN 50 MG: 50 CAPSULE ORAL at 17:16

## 2017-11-09 RX ADMIN — IPRATROPIUM BROMIDE AND ALBUTEROL SULFATE 3 ML: .5; 3 SOLUTION RESPIRATORY (INHALATION) at 20:26

## 2017-11-09 RX ADMIN — IPRATROPIUM BROMIDE AND ALBUTEROL SULFATE 3 ML: .5; 3 SOLUTION RESPIRATORY (INHALATION) at 14:38

## 2017-11-09 RX ADMIN — ASPIRIN 81 MG 81 MG: 81 TABLET ORAL at 08:37

## 2017-11-09 RX ADMIN — INSULIN LISPRO 6 UNITS: 100 INJECTION, SOLUTION INTRAVENOUS; SUBCUTANEOUS at 12:25

## 2017-11-09 RX ADMIN — INSULIN LISPRO 6 UNITS: 100 INJECTION, SOLUTION INTRAVENOUS; SUBCUTANEOUS at 08:38

## 2017-11-09 RX ADMIN — AMLODIPINE BESYLATE 5 MG: 5 TABLET ORAL at 08:36

## 2017-11-09 RX ADMIN — IPRATROPIUM BROMIDE AND ALBUTEROL SULFATE 3 ML: .5; 3 SOLUTION RESPIRATORY (INHALATION) at 08:04

## 2017-11-09 RX ADMIN — PREGABALIN 50 MG: 50 CAPSULE ORAL at 08:37

## 2017-11-09 RX ADMIN — METHYLPREDNISOLONE SODIUM SUCCINATE 20 MG: 40 INJECTION, POWDER, FOR SOLUTION INTRAMUSCULAR; INTRAVENOUS at 21:58

## 2017-11-09 RX ADMIN — FLUTICASONE PROPIONATE AND SALMETEROL 1 PUFF: 50; 250 POWDER RESPIRATORY (INHALATION) at 08:39

## 2017-11-09 RX ADMIN — HYDROCODONE POLISTIREX AND CHLORPHENIRAMINE POLISTIREX 5 ML: 10; 8 SUSPENSION, EXTENDED RELEASE ORAL at 08:39

## 2017-11-09 RX ADMIN — METOPROLOL TARTRATE 25 MG: 25 TABLET ORAL at 20:39

## 2017-11-09 RX ADMIN — METHYLPREDNISOLONE SODIUM SUCCINATE 40 MG: 40 INJECTION, POWDER, FOR SOLUTION INTRAMUSCULAR; INTRAVENOUS at 06:15

## 2017-11-09 RX ADMIN — PRAVASTATIN SODIUM 40 MG: 40 TABLET ORAL at 17:16

## 2017-11-09 RX ADMIN — INSULIN GLARGINE 60 UNITS: 100 INJECTION, SOLUTION SUBCUTANEOUS at 22:02

## 2017-11-09 RX ADMIN — ENOXAPARIN SODIUM 40 MG: 40 INJECTION SUBCUTANEOUS at 08:38

## 2017-11-09 RX ADMIN — AZITHROMYCIN 500 MG: 250 TABLET, FILM COATED ORAL at 17:15

## 2017-11-09 RX ADMIN — ALPRAZOLAM 2 MG: 0.5 TABLET ORAL at 22:01

## 2017-11-09 RX ADMIN — HYDROXYZINE HYDROCHLORIDE 50 MG: 25 TABLET, FILM COATED ORAL at 22:00

## 2017-11-09 RX ADMIN — IPRATROPIUM BROMIDE AND ALBUTEROL SULFATE 3 ML: .5; 3 SOLUTION RESPIRATORY (INHALATION) at 02:24

## 2017-11-09 RX ADMIN — QUETIAPINE 800 MG: 300 TABLET, EXTENDED RELEASE ORAL at 22:01

## 2017-11-09 RX ADMIN — PANTOPRAZOLE SODIUM 40 MG: 40 TABLET, DELAYED RELEASE ORAL at 06:14

## 2017-11-09 RX ADMIN — FLUTICASONE PROPIONATE AND SALMETEROL 1 PUFF: 50; 250 POWDER RESPIRATORY (INHALATION) at 20:38

## 2017-11-09 RX ADMIN — METHYLPREDNISOLONE SODIUM SUCCINATE 20 MG: 40 INJECTION, POWDER, FOR SOLUTION INTRAMUSCULAR; INTRAVENOUS at 14:01

## 2017-11-09 RX ADMIN — FENOFIBRATE 168 MG: 48 TABLET ORAL at 08:37

## 2017-11-09 RX ADMIN — METFORMIN HYDROCHLORIDE 500 MG: 500 TABLET, FILM COATED ORAL at 22:00

## 2017-11-09 RX ADMIN — METOPROLOL TARTRATE 25 MG: 25 TABLET ORAL at 08:38

## 2017-11-09 RX ADMIN — LISINOPRIL 20 MG: 20 TABLET ORAL at 08:37

## 2017-11-09 NOTE — ED PROVIDER NOTES
History  Chief Complaint   Patient presents with    Shortness of Breath     short of breath for 1 week and a cough, pt sts weakness as well  neb tx and inhaler not working well      58-year-old male with past medical history of COPD, diabetes, bipolar disorder, hypertension, CAD, obesity, diabetes, presents to the ER with complaint of increasing shortness of breath over the past 1 week  Patient is on 2 L nasal cannula oxygen at night  Earlier this morning he checked his oxygen saturation and was 88%  Patient then came to the ER for further evaluation of his COPD exacerbation  Patient also noted left-sided anterior chest pain that started at rest this morning  Patient denies any fevers, chills, nausea, vomiting, diarrhea, dysuria  History provided by:  Patient  Shortness of Breath   Associated symptoms: chest pain    Associated symptoms: no abdominal pain, no cough, no fever, no headaches, no sore throat, no vomiting and no wheezing        Prior to Admission Medications   Prescriptions Last Dose Informant Patient Reported? Taking? ALPRAZolam (XANAX) 2 MG tablet 11/5/2017 at Unknown time  Yes Yes   Sig: Take 2 mg by mouth daily at bedtime as needed for anxiety   Cariprazine HCl (VRAYLAR) 4 5 MG CAPS 11/5/2017 at Unknown time  Yes Yes   Sig: Take 4 5 mg by mouth daily at bedtime   Liraglutide (VICTOZA SC) 11/5/2017 at Unknown time  Yes Yes   Sig: Inject 12 Units under the skin daily with breakfast   QUEtiapine (SEROquel XR) 400 mg 24 hr tablet 11/5/2017 at Unknown time  Yes Yes   Sig: Take 800 mg by mouth daily at bedtime  albuterol (2 5 mg/3 mL) 0 083 % nebulizer solution 11/6/2017 at Unknown time  Yes Yes   Sig: Take 2 5 mg by nebulization every 6 (six) hours as needed for wheezing     amLODIPine (NORVASC) 5 mg tablet 11/5/2017 at Unknown time  Yes Yes   Sig: Take 5 mg by mouth 2 (two) times a day     aspirin 81 MG tablet 11/5/2017 at Unknown time  Yes Yes   Sig: Take 81 mg by mouth daily fenofibrate (TRIGLIDE) 160 MG tablet 2017 at Unknown time  Yes Yes   Sig: Take 160 mg by mouth daily  fluticasone-salmeterol (ADVAIR) 250-50 mcg/dose inhaler 2017 at Unknown time  Yes Yes   Sig: Inhale 1 puff every 12 (twelve) hours  glucose blood test strip 2017 at Unknown time  Yes Yes   Si each by Other route as needed Use as instructed   hydrOXYzine pamoate (VISTARIL) 50 mg capsule 2017 at Unknown time  Yes Yes   Sig: Take 50 mg by mouth daily at bedtime  insulin aspart (NovoLOG) 100 units/mL injection 2017 at Unknown time Self Yes Yes   Sig: Inject 14 Units under the skin 2 (two) times a day before breakfast and lunch     insulin aspart (NovoLOG) 100 units/mL injection 2017 at Unknown time Self Yes Yes   Sig: Inject 16 Units under the skin daily with dinner     insulin glargine (LANTUS) 100 units/mL subcutaneous injection 2017 at Unknown time  Yes Yes   Sig: Inject 44 Units under the skin daily at bedtime     lisinopril (ZESTRIL) 20 mg tablet 2017 at Unknown time  Yes Yes   Sig: Take 20 mg by mouth daily   lovastatin (MEVACOR) 40 MG tablet 2017 at Unknown time  Yes Yes   Sig: Take 40 mg by mouth daily at bedtime  metFORMIN (GLUCOPHAGE) 1000 MG tablet 2017 at Unknown time  Yes Yes   Sig: Take 500 mg by mouth daily at bedtime     metoprolol tartrate (LOPRESSOR) 25 mg tablet 2017 at Unknown time  Yes Yes   Sig: Take 25 mg by mouth every 12 (twelve) hours   mirtazapine (REMERON) 30 mg tablet 2017 at Unknown time  Yes Yes   Sig: Take 30 mg by mouth daily at bedtime   omeprazole (PriLOSEC) 20 mg delayed release capsule 2017 at Unknown time  Yes Yes   Sig: Take 20 mg by mouth every evening   pregabalin (LYRICA) 50 mg capsule 2017 at Unknown time  Yes Yes   Sig: Take 50 mg by mouth 3 (three) times a day       venlafaxine (EFFEXOR) 75 mg tablet 2017 at Unknown time  Yes Yes   Sig: Take 150 mg by mouth every morning Facility-Administered Medications: None       Past Medical History:   Diagnosis Date    Bipolar affective ([facility] )     [facility]ch[facility] disease) ([facility] )     Coronary artery disease     Diabetes mellitus ([facility] )     Hypertension     Obesity     SHAVONNE on CPAP     wears c-pap at 9    Psychiatric disorder     bipolar       Past Surgical History:   Procedure Laterality Date    BACK SURGERY      CARDIAC CATHETERIZATION      CHOLECYSTECTOMY      COLONOSCOPY N/A 1/4/2017    Procedure: COLONOSCOPY;  Surgeon: Jacques Summers MD;  Location: [facility] GI LAB; Service:     COLONOSCOPY N/A 9/11/2017    Procedure: COLONOSCOPY;  Surgeon: Clifford Epley, MD;  Location: [facility] GI LAB; Service: Gastroenterology    ESOPHAGOGASTRODUODENOSCOPY N/A 3/15/2017    Procedure: ESOPHAGOGASTRODUODENOSCOPY (EGD) WITH BOTOX;  Surgeon: Jacques Summers MD;  Location: [facility] GI LAB; Service:     ESOPHAGOGASTRODUODENOSCOPY N/A 1/4/2017    Procedure: ESOPHAGOGASTRODUODENOSCOPY (EGD); Surgeon: Jacques Summers MD;  Location: [facility] GI LAB; Service:     HERNIA REPAIR      INCISION AND DRAINAGE OF WOUND Left 1/13/2016    Procedure: INCISION AND DRAINAGE (I&D) LEFT GROIN ABSCESS DESCENDING TO PERIRECTAL REGION;  Surgeon: Ivett Acuña MD;  Location: [address] [place] [address];  Service:    Nasrin Diehle ARTHROSCOPY Right 2013    CA EGD TRANSORAL BIOPSY SINGLE/MULTIPLE N/A 9/20/2017    Procedure: ESOPHAGOGASTRODUODENOSCOPY (EGD); Surgeon: Jacques Summers MD;  Location: [facility] GI LAB; Service: Gastroenterology       Family History   Problem Relation Age of Onset    No Known Problems Mother     Heart disease Father      exp MI age 64    Heart disease Sister     Diabetes Paternal Grandmother      I have reviewed and agree with the history as documented      Social History   Substance Use Topics    Smoking status: Former Smoker     Packs/day: 2 50     Years: 25 00     Quit date: 2001    Smokeless tobacco: Never Used      Comment: quit 2001    Alcohol use No Review of Systems   Constitutional: Negative for activity change, fatigue and fever  HENT: Negative for congestion, ear discharge and sore throat  Eyes: Negative for pain and redness  Respiratory: Positive for shortness of breath  Negative for cough, chest tightness and wheezing  Cardiovascular: Positive for chest pain  Gastrointestinal: Negative for abdominal pain, diarrhea, nausea and vomiting  Endocrine: Negative for cold intolerance  Genitourinary: Negative for dysuria and urgency  Musculoskeletal: Negative for arthralgias and back pain  Neurological: Negative for dizziness, weakness and headaches  Psychiatric/Behavioral: Negative for agitation and behavioral problems  Physical Exam  ED Triage Vitals [11/06/17 0838]   Temperature Pulse Respirations Blood Pressure SpO2   98 °F (36 7 °C) 102 20 167/76 90 %      Temp Source Heart Rate Source Patient Position - Orthostatic VS BP Location FiO2 (%)   Oral Monitor Sitting Right arm --      Pain Score       8           Orthostatic Vital Signs  Vitals:    11/08/17 0419 11/08/17 0854 11/08/17 1602 11/09/17 0020   BP: 129/60 151/75 121/73 115/69   Pulse: 84 96 74 71   Patient Position - Orthostatic VS: Lying Sitting Sitting Lying       Physical Exam   Constitutional: He is oriented to person, place, and time  He appears well-developed and well-nourished  HENT:   Head: Normocephalic and atraumatic  Nose: Nose normal    Mouth/Throat: Oropharynx is clear and moist    Eyes: Conjunctivae and EOM are normal    Neck: Normal range of motion  Neck supple  Cardiovascular: Normal rate, regular rhythm and normal heart sounds  Pulmonary/Chest: Effort normal  He has wheezes  He exhibits tenderness  Scattered wheezes noted bilateral   Mild tenderness to palpation noted along the left anterior chest wall  Abdominal: Soft  Bowel sounds are normal  He exhibits no distension  There is no tenderness  Musculoskeletal: Normal range of motion  Neurological: He is alert and oriented to person, place, and time  Skin: Skin is warm  Psychiatric: He has a normal mood and affect  His behavior is normal  Judgment and thought content normal    Nursing note and vitals reviewed        ED Medications  Medications   ALPRAZolam (XANAX) tablet 2 mg (2 mg Oral Given 11/8/17 2146)   amLODIPine (NORVASC) tablet 5 mg (5 mg Oral Given 11/8/17 1746)   aspirin chewable tablet 81 mg (81 mg Oral Given 11/8/17 0858)   fenofibrate (TRICOR) tablet 168 mg (168 mg Oral Given 11/8/17 0856)   fluticasone-salmeterol (ADVAIR) 250-50 mcg/dose inhaler 1 puff (1 puff Inhalation Given 11/8/17 2059)   lisinopril (ZESTRIL) tablet 20 mg (20 mg Oral Given 11/8/17 0859)   pravastatin (PRAVACHOL) tablet 40 mg (40 mg Oral Given 11/8/17 1746)   metFORMIN (GLUCOPHAGE) tablet 500 mg (500 mg Oral Given 11/8/17 2146)   metoprolol tartrate (LOPRESSOR) tablet 25 mg (25 mg Oral Given 11/8/17 2059)   mirtazapine (REMERON) tablet 30 mg (30 mg Oral Given 11/8/17 2146)   pantoprazole (PROTONIX) EC tablet 40 mg (40 mg Oral Given 11/9/17 0614)   pregabalin (LYRICA) capsule 50 mg (50 mg Oral Given 11/8/17 2059)   QUEtiapine (SEROquel XR) 24 hr tablet 800 mg (800 mg Oral Given 11/8/17 2146)   Cariprazine HCl CAPS 4 5 mg (4 5 mg Oral Not Given 11/8/17 2149)   ondansetron (ZOFRAN) injection 4 mg (not administered)   enoxaparin (LOVENOX) subcutaneous injection 40 mg (40 mg Subcutaneous Given 11/8/17 0858)   acetaminophen (TYLENOL) tablet 650 mg (650 mg Oral Given 11/7/17 1543)   methylPREDNISolone sodium succinate (Solu-MEDROL) injection 40 mg (40 mg Intravenous Given 11/9/17 0615)   azithromycin (ZITHROMAX) tablet 500 mg (500 mg Oral Given 11/8/17 1746)   ipratropium-albuterol (DUO-NEB) 0 5-2 5 mg/mL inhalation solution 3 mL (not administered)   ipratropium-albuterol (DUO-NEB) 0 5-2 5 mg/mL inhalation solution 3 mL (3 mL Nebulization Given 11/9/17 7424)   insulin lispro (HumaLOG) 100 units/mL subcutaneous indicative of a coronary event without the clinical setting of myocardial ischemia  Magnesium [[id number]]  (Normal) Collected:  11/06/17 0927    Lab Status:  Final result Specimen:  Blood from Arm, Left Updated:  11/06/17 1002     Magnesium 2 0 mg/dL     Basic metabolic panel [[id number]]  (Abnormal) Collected:  11/06/17 6923    Lab Status:  Final result Specimen:  Blood from Arm, Left Updated:  11/06/17 1002     Sodium 134 (L) mmol/L      Potassium 4 5 mmol/L      Chloride 96 (L) mmol/L      CO2 27 mmol/L      Anion Gap 11 mmol/L      BUN 27 (H) mg/dL      Creatinine 1 03 mg/dL      Glucose 280 (H) mg/dL      Calcium 9 1 mg/dL      eGFR 80 ml/min/1 73sq m     Narrative:         National Kidney Disease Education Program recommendations are as follows:  GFR calculation is accurate only with a steady state creatinine  Chronic Kidney disease less than 60 ml/min/1 73 sq  meters  Kidney failure less than 15 ml/min/1 73 sq  meters  CBC and differential [[id number]]  (Abnormal) Collected:  11/06/17 0927    Lab Status:  Final result Specimen:  Blood from Arm, Left Updated:  11/06/17 0935     WBC 6 50 Thousand/uL      RBC 4 59 (L) Million/uL      Hemoglobin 14 3 g/dL      Hematocrit 41 9 (L) %      MCV 91 fL      MCH 31 2 (H) pg      MCHC 34 1 g/dL      RDW 13 6 %      MPV 7 6 (L) fL      Platelets 351 Thousands/uL      nRBC 0 /100 WBCs      Neutrophils Relative 57 %      Lymphocytes Relative 35 %      Monocytes Relative 7 %      Eosinophils Relative 1 %      Basophils Relative 0 %      Neutrophils Absolute 3 70 Thousands/µL      Lymphocytes Absolute 2 30 Thousands/µL      Monocytes Absolute 0 50 Thousand/µL      Eosinophils Absolute 0 10 Thousand/µL      Basophils Absolute 0 00 Thousands/µL                  XR chest 2 views   Final Result by David Up DO (11/06 1554)      Linear airspace abnormality at the right lung base similar to prior study may represent scarring or atelectasis  Stable cardio megaly  Workstation performed: [id number]                    Procedures  Procedures       Phone Contacts  ED Phone Contact    ED Course  ED Course                                MDM  Number of Diagnoses or Management Options  Chest pain:   COPD exacerbation [facility]):   Diagnosis management comments: Obtain blood work, chest x-ray, troponin  Give steroids, neb treatment and reassess symptoms       Amount and/or Complexity of Data Reviewed  Clinical lab tests: ordered and reviewed  Tests in the radiology section of CPT®: ordered and reviewed  Tests in the medicine section of CPT®: ordered and reviewed  Independent visualization of images, tracings, or specimens: yes    Risk of Complications, Morbidity, and/or Mortality  General comments: Patient's shortness of breath and wheezing improved with neb treatment and steroids  Patient continued to have some chest pain despite given morphine  Patient subsequently admitted for further evaluation of COPD and chest pain  Patient agrees with admission plans  Patient Progress  Patient progress: stable    CritCare Time    Disposition  Final diagnoses:   COPD exacerbation ([facility] )   Chest pain     Time reflects when diagnosis was documented in both MDM as applicable and the Disposition within this note     Time User Action Codes Description Comment    11/6/2017 12:04 PM Kennedy Torres Add [J44 1] COPD exacerbation ([facility] )     11/6/2017 12:04 PM Kennedy Torres Add [R07 9] Chest pain     11/6/2017  3:05 PM Jeri Pfeiffer Modify [J44 1] COPD exacerbation [facility])       ED Disposition     ED Disposition Condition Comment    Admit  Case was discussed with Dr Tristan Garzon and the patient's admission status was agreed to be Admission Status: inpatient status to the service of Dr Tristan Garzon          Follow-up Information    None       Current Discharge Medication List      CONTINUE these medications which have NOT CHANGED    Details   albuterol (2 5 mg/3 mL) 0 083 % nebulizer solution Take 2 5 mg by nebulization every 6 (six) hours as needed for wheezing  ALPRAZolam (XANAX) 2 MG tablet Take 2 mg by mouth daily at bedtime as needed for anxiety      amLODIPine (NORVASC) 5 mg tablet Take 5 mg by mouth 2 (two) times a day        aspirin 81 MG tablet Take 81 mg by mouth daily      Cariprazine HCl (VRAYLAR) 4 5 MG CAPS Take 4 5 mg by mouth daily at bedtime      fenofibrate (TRIGLIDE) 160 MG tablet Take 160 mg by mouth daily  fluticasone-salmeterol (ADVAIR) 250-50 mcg/dose inhaler Inhale 1 puff every 12 (twelve) hours  glucose blood test strip 1 each by Other route as needed Use as instructed      hydrOXYzine pamoate (VISTARIL) 50 mg capsule Take 50 mg by mouth daily at bedtime  !! insulin aspart (NovoLOG) 100 units/mL injection Inject 14 Units under the skin 2 (two) times a day before breakfast and lunch        !! insulin aspart (NovoLOG) 100 units/mL injection Inject 16 Units under the skin daily with dinner        insulin glargine (LANTUS) 100 units/mL subcutaneous injection Inject 44 Units under the skin daily at bedtime        Liraglutide (VICTOZA SC) Inject 12 Units under the skin daily with breakfast      lisinopril (ZESTRIL) 20 mg tablet Take 20 mg by mouth daily      lovastatin (MEVACOR) 40 MG tablet Take 40 mg by mouth daily at bedtime  metFORMIN (GLUCOPHAGE) 1000 MG tablet Take 500 mg by mouth daily at bedtime        metoprolol tartrate (LOPRESSOR) 25 mg tablet Take 25 mg by mouth every 12 (twelve) hours      mirtazapine (REMERON) 30 mg tablet Take 30 mg by mouth daily at bedtime      omeprazole (PriLOSEC) 20 mg delayed release capsule Take 20 mg by mouth every evening      pregabalin (LYRICA) 50 mg capsule Take 50 mg by mouth 3 (three) times a day  QUEtiapine (SEROquel XR) 400 mg 24 hr tablet Take 800 mg by mouth daily at bedtime  venlafaxine (EFFEXOR) 75 mg tablet Take 150 mg by mouth every morning         !! - Potential duplicate medications found   Please discuss with provider  No discharge procedures on file      ED Provider  Electronically Signed by           Dimas Franklin DO  11/09/17 0003

## 2017-11-10 VITALS
TEMPERATURE: 97.8 F | HEART RATE: 86 BPM | BODY MASS INDEX: 39.04 KG/M2 | RESPIRATION RATE: 18 BRPM | DIASTOLIC BLOOD PRESSURE: 71 MMHG | OXYGEN SATURATION: 90 % | SYSTOLIC BLOOD PRESSURE: 133 MMHG | HEIGHT: 71 IN | WEIGHT: 278.88 LBS

## 2017-11-10 PROBLEM — J20.9 ACUTE BRONCHITIS: Status: RESOLVED | Noted: 2017-03-20 | Resolved: 2017-11-10

## 2017-11-10 PROBLEM — R07.89 LEFT-SIDED CHEST WALL PAIN: Status: RESOLVED | Noted: 2017-03-20 | Resolved: 2017-11-10

## 2017-11-10 LAB
GLUCOSE SERPL-MCNC: 253 MG/DL (ref 65–140)
GLUCOSE SERPL-MCNC: 406 MG/DL (ref 65–140)

## 2017-11-10 PROCEDURE — 94640 AIRWAY INHALATION TREATMENT: CPT

## 2017-11-10 PROCEDURE — 82948 REAGENT STRIP/BLOOD GLUCOSE: CPT

## 2017-11-10 PROCEDURE — 94760 N-INVAS EAR/PLS OXIMETRY 1: CPT

## 2017-11-10 RX ORDER — INSULIN GLARGINE 100 [IU]/ML
50 INJECTION, SOLUTION SUBCUTANEOUS
Qty: 10 ML | Refills: 0 | Status: SHIPPED | OUTPATIENT
Start: 2017-11-10 | End: 2018-02-06

## 2017-11-10 RX ORDER — AZITHROMYCIN 250 MG/1
500 TABLET, FILM COATED ORAL ONCE
Status: COMPLETED | OUTPATIENT
Start: 2017-11-10 | End: 2017-11-10

## 2017-11-10 RX ORDER — INSULIN GLARGINE 100 [IU]/ML
50 INJECTION, SOLUTION SUBCUTANEOUS
Qty: 10 ML | Refills: 0 | Status: CANCELLED | OUTPATIENT
Start: 2017-11-10

## 2017-11-10 RX ORDER — PREDNISONE 10 MG/1
TABLET ORAL
Qty: 30 TABLET | Refills: 0 | Status: SHIPPED | OUTPATIENT
Start: 2017-11-10 | End: 2018-02-21 | Stop reason: ALTCHOICE

## 2017-11-10 RX ADMIN — AMLODIPINE BESYLATE 5 MG: 5 TABLET ORAL at 08:23

## 2017-11-10 RX ADMIN — INSULIN LISPRO 6 UNITS: 100 INJECTION, SOLUTION INTRAVENOUS; SUBCUTANEOUS at 11:57

## 2017-11-10 RX ADMIN — VENLAFAXINE HYDROCHLORIDE 150 MG: 150 CAPSULE, EXTENDED RELEASE ORAL at 08:22

## 2017-11-10 RX ADMIN — ENOXAPARIN SODIUM 40 MG: 40 INJECTION SUBCUTANEOUS at 08:24

## 2017-11-10 RX ADMIN — ACETAMINOPHEN 650 MG: 325 TABLET, FILM COATED ORAL at 08:33

## 2017-11-10 RX ADMIN — PANTOPRAZOLE SODIUM 40 MG: 40 TABLET, DELAYED RELEASE ORAL at 05:32

## 2017-11-10 RX ADMIN — ASPIRIN 81 MG 81 MG: 81 TABLET ORAL at 08:23

## 2017-11-10 RX ADMIN — FLUTICASONE PROPIONATE AND SALMETEROL 1 PUFF: 50; 250 POWDER RESPIRATORY (INHALATION) at 08:22

## 2017-11-10 RX ADMIN — AZITHROMYCIN 500 MG: 250 TABLET, FILM COATED ORAL at 13:57

## 2017-11-10 RX ADMIN — PREGABALIN 50 MG: 50 CAPSULE ORAL at 08:22

## 2017-11-10 RX ADMIN — METHYLPREDNISOLONE SODIUM SUCCINATE 20 MG: 40 INJECTION, POWDER, FOR SOLUTION INTRAMUSCULAR; INTRAVENOUS at 05:33

## 2017-11-10 RX ADMIN — INSULIN LISPRO 3 UNITS: 100 INJECTION, SOLUTION INTRAVENOUS; SUBCUTANEOUS at 08:27

## 2017-11-10 RX ADMIN — LISINOPRIL 20 MG: 20 TABLET ORAL at 08:23

## 2017-11-10 RX ADMIN — METHYLPREDNISOLONE SODIUM SUCCINATE 20 MG: 40 INJECTION, POWDER, FOR SOLUTION INTRAMUSCULAR; INTRAVENOUS at 13:21

## 2017-11-10 RX ADMIN — FENOFIBRATE 168 MG: 48 TABLET ORAL at 08:24

## 2017-11-10 RX ADMIN — METOPROLOL TARTRATE 25 MG: 25 TABLET ORAL at 08:23

## 2017-11-10 RX ADMIN — IPRATROPIUM BROMIDE AND ALBUTEROL SULFATE 3 ML: .5; 3 SOLUTION RESPIRATORY (INHALATION) at 02:23

## 2017-11-10 RX ADMIN — IPRATROPIUM BROMIDE AND ALBUTEROL SULFATE 3 ML: .5; 3 SOLUTION RESPIRATORY (INHALATION) at 07:49

## 2017-11-10 NOTE — PROGRESS NOTES
Performed a walking pulse ox with patient walking down the [address] per Dr Chris Ward  Oxygen saturation remained between 93-94% during walk

## 2017-11-10 NOTE — NURSING NOTE
Patient left via wheelchair in stable condition  Discharge instructions and new prescription information reviewed with patient  Patient states understanding

## 2017-11-10 NOTE — PLAN OF CARE
CARDIOVASCULAR - ADULT     Maintains optimal cardiac output and hemodynamic stability Completed     Absence of cardiac dysrhythmias or at baseline rhythm Completed        DISCHARGE PLANNING - CARE MANAGEMENT     Discharge to post-acute care or home with appropriate resources Completed        METABOLIC, FLUID AND ELECTROLYTES - ADULT     Glucose maintained within target range Completed        Potential for Falls     Patient will remain free of falls Completed        RESPIRATORY - ADULT     Achieves optimal ventilation and oxygenation Completed

## 2017-11-10 NOTE — PROGRESS NOTES
Progress Note - Pulmonary   Km Colace 61 y o  male MRN: [id number]  Unit/Bed#: [address] [place] [address] Encounter: [id number]    Assessment:  Acute bronchitis with exacerbation ofchronic bronchitis has improved  No specific organism isolated from sputum culture  Mild SHAVONNE  Alveolar hypoventilation secondary to obesity    Plan:  Patient did have 5 days of azithromycin 500 mg as of today which should be adequate  He will resume his CPAP of 9 with 2 L of oxygen at bedtime when he goes home  Patient is does he does have Advair Diskus 250 mcg and will resume this 1 puff twice a day when he goes home  Could consider prednisone 40 mg daily with 10 mg taper every 4th day  Patient has a follow up appointment in our office for 11/15      Subjective:   Feels better  Cough has improved  No shortness of breath at rest     Objective:     Vitals: Blood pressure 133/71, pulse 86, temperature 97 8 °F (36 6 °C), temperature source Oral, resp  rate 18, height 5' 11" (1 803 m), weight 126 kg (278 lb 14 1 oz), SpO2 90 %  ,Body mass index is 38 9 kg/m²  No intake or output data in the 24 hours ending 11/10/17 1410    Physical Exam: Physical Exam   Constitutional: He is oriented to person, place, and time  Awake, alert, RA O2 sat is 94%   HENT:   Head: Normocephalic  Nose: Nose normal    Mouth/Throat: Oropharynx is clear and moist    Eyes: Conjunctivae are normal    Neck: Neck supple  No JVD present  Cardiovascular: Normal rate, regular rhythm and normal heart sounds  Pulmonary/Chest: Effort normal    Clear but decreased breath sounds   Abdominal: Soft  Obese, nontender   Musculoskeletal: He exhibits no edema  Lymphadenopathy:     He has no cervical adenopathy  Neurological: He is alert and oriented to person, place, and time  Skin: Skin is warm  Psychiatric: He has a normal mood and affect  His behavior is normal  Thought content normal         Labs: I have personally reviewed pertinent lab results  , ABG:   Lab Results Component Value Date    PHART 7 422 03/22/2017    BUH6TMO 35 7 (L) 03/22/2017    PO2ART 77 7 03/22/2017    BWX8BAM 22 7 03/22/2017    BEART -1 2 03/22/2017    SOURCE Brachial, Left 03/22/2017   , BNP: No results found for: BNP, CBC:   Lab Results   Component Value Date    WBC 11 00 (H) 11/08/2017    HGB 14 7 11/08/2017    HCT 44 0 11/08/2017    MCV 93 11/08/2017     11/08/2017    ADJUSTEDWBC 8 60 09/14/2016    MCH 31 0 11/08/2017    MCHC 33 5 11/08/2017    RDW 13 4 11/08/2017    MPV 8 1 (L) 11/08/2017    NRBC 0 11/06/2017   , CMP:   Lab Results   Component Value Date     (L) 11/09/2017    K 4 6 11/09/2017    CL 93 (L) 11/09/2017    CO2 26 11/09/2017    ANIONGAP 11 11/09/2017    BUN 22 11/09/2017    CREATININE 0 96 11/09/2017    GLUCOSE 314 (H) 11/09/2017    CALCIUM 9 5 11/09/2017    AST 30 09/06/2017    ALT 48 09/06/2017    ALKPHOS 64 09/06/2017    PROT 7 7 09/06/2017    ALBUMIN 4 5 09/06/2017    BILITOT 0 50 09/06/2017    EGFR 87 11/09/2017   , PT/INR:   Lab Results   Component Value Date    INR 0 95 03/20/2017   , Troponin: No components found for: TROPONIN    Imaging and other studies: I have personally reviewed pertinent reports     and I have personally reviewed pertinent films in PACS

## 2017-11-10 NOTE — DISCHARGE SUMMARY
Discharge Summary - [facility] Internal Medicine    Patient Information: Elizabeth Mederos 61 y o  male MRN: [id number]  Unit/Bed#: [address] [place] [address] Encounter: [id number]    Discharging Physician / Practitioner: Mike Del Toro DO  PCP: Anshul Martinez MD  Admission Date: 11/6/2017  Discharge Date: 11/10/17    Reason for Admission: Shortness of Breath (short of breath for 1 week and a cough, pt sts weakness as well  neb tx and inhaler not working well )      Discharge Diagnoses:     Principal Problem:    COPD exacerbation ([facility] )  Active Problems:    Bipolar affective ([facility] 75 )    Hypertension    Diabetes mellitus ([place] [address] )    SHAVONNE on CPAP  Resolved Problems:    Acute bronchitis    Left-sided chest wall pain      Consultations During Hospital Stay:  IP CONSULT TO CASE MANAGEMENT  IP CONSULT TO PULMONOLOGY    Procedures Performed:     · None    Significant Findings:     · See hospital course    Imaging while in hospital:    Xr Chest 2 Views    Result Date: 11/6/2017  Narrative: CHEST - DUAL ENERGY INDICATION:  Shortness of breath, cough for one week  Weakness  COMPARISON:  September 6, 2017 VIEWS:  PA (including soft tissue/bone algorithms) and lateral projections IMAGES:  4 FINDINGS:     Stable cardiomegaly  Linear airspace opacities right lung base stable from prior study may represents atelectasis or scarring  Visualized osseous structures appear within normal limits for the patient's age  Impression: Linear airspace abnormality at the right lung base similar to prior study may represent scarring or atelectasis  Stable cardio Connectem   Workstation performed: [id number]       Incidental Findings:   · none    Test Results Pending at Discharge (will require follow up):   · As per After Visit Summary     Outpatient Tests Requested:  · none    Complications:  See hospital course    Hospital Course:     Elizabeth Mederos is a 61 y o  male patient who originally presented to the hospital on 11/6/2017 due to worsening shortness of breath, wheezing x1 week  He was using his nebulizer treatments at home with minimal improvement in his symptoms  He also reported a cough x1 week  He reported some left-sided chest pain under his left breast which he attributed to the coughing  he checked his oxygen saturation at home and it was 88%  Patient was admitted for acute COPD exacerbation and started on IV Solu-Medrol  He was continued on his Advair and nebulizer treatments  Once his symptoms started to improve the dose of Solu-Medrol was tapered down and on discharge he was discharged home on prednisone taper  For his acute bronchitis he was placed on on Zithromax to finish a 5 day course of it here  There was no obvious pneumonia on chest x-ray  patient's blood sugars were uncontrolled, partly due to steroids  Patient's hemoglobin A1c Is 9 7  His insulin doses were increased and patient was advised to check his sugars 3 times a day with meals and at bedtime and to keep a record of it and to follow up with his PCP next week to get his insulin dose adjusted based on his sugars  Patient was also counseled about the importance of having tighter control on his sugars  Patient states that he will be following up with the nutritionist as outpatient  for his left-sided chest wall pain he had troponins done which were negative  this is most likely due to coughing  He has had recent cardiac catheterization which was within normal limits as per him  he was also noted to have hyponatremia on lab work but this is most likely pseudohyponatremia from hyperglycemia  once his symptoms improved he was discharged home  Patient was noted to be ambulating without getting hypoxic off of oxygen  He will follow up with Pulmonary and his PCP after discharge  Condition at Discharge: stable     Discharge Day Visit / Exam:     Subjective:  States cough is better  Breathing is ~ 80% close to baseline   Feels ready to go home    Vitals: Blood Pressure: 133/71 (11/10/17 0710)  Pulse: 86 (11/10/17 0750)  Temperature: 97 8 °F (36 6 °C) (11/10/17 0013)  Temp Source: Oral (11/10/17 0013)  Respirations: 18 (11/10/17 0750)  Height: 5' 11" (180 3 cm) (11/06/17 1252)  Weight - Scale: 126 kg (278 lb 14 1 oz) (11/06/17 1252)  SpO2: 90 % (11/10/17 0750)  Exam:   Physical Exam   Constitutional: He is oriented to person, place, and time  He appears well-developed and well-nourished  No distress  HENT:   Head: Normocephalic and atraumatic  Mouth/Throat: Oropharynx is clear and moist    Eyes: EOM are normal  Right eye exhibits no discharge  Left eye exhibits no discharge  No scleral icterus  Neck: Neck supple  No tracheal deviation present  Cardiovascular: Normal rate and regular rhythm  Pulmonary/Chest: Effort normal  No respiratory distress  He has wheezes (minimal)  He has no rales  Abdominal: Soft  Bowel sounds are normal  He exhibits no distension  There is no tenderness  Musculoskeletal: He exhibits edema (trace B/L L  E)  Neurological: He is alert and oriented to person, place, and time  No cranial nerve deficit  Skin: Skin is dry  He is not diaphoretic  Psychiatric: He has a normal mood and affect  Discharge instructions/Information to patient and family:(Discharge Medications and Follow up):   See after visit summary for information provided to patient and family  Provisions for Follow-Up Care:  See after visit summary for information related to follow-up care and any pertinent home health orders  Disposition: Home    Planned Readmission:  No     Discharge Statement:  I spent > 30 minutes discharging the patient  This time was spent on the day of discharge  I had direct contact with the patient on the day of discharge  Greater than 50% of the total time was spent examining patient, answering all patient questions, arranging and discussing plan of care with patient as well as directly providing post-discharge instructions    Additional time then spent on discharge activities  Discharge Medications:  See after visit summary for reconciled discharge medications provided to patient and family  ** Please Note: Dragon 360 Dictation voice to text software may have been used in the creation of this document   **

## 2017-11-10 NOTE — DISCHARGE INSTRUCTIONS
Check your sugars three times a day with meals and at bedtime and keep a record of it   Take this with you when you follow up with your primary care doctor next week to adjust your insulin based on your sugars

## 2017-11-10 NOTE — PROGRESS NOTES
[facility] Progress Note  Patient: Mickie Hernandez 61 y o  male   MRN: [id number]  PCP: David West MD  Unit/Bed#: [address] [place] [address] Encounter: [id number]  Date Of Visit: 11/09/17    Problem List:    Principal Problem:    COPD exacerbation ([facility] 75 )  Active Problems:    Bipolar affective ([facility] )    Hypertension    Diabetes mellitus ([facility] )    Acute bronchitis    Left-sided chest wall pain      Assessment & Plan:    1  Acute COPD exacerbation- improving  Dose of IV Solu-Medrol decreased to 20 mg q 8 hours  Continue Advair, nebs treatments  2  Acute bronchitis -continue Zithromax  No pneumonia on chest x-ray  3  Uncontrolled insulin-dependent diabetes -patient's hemoglobin A1c is 9 7  Sugars are not well controlled here partly due to steroids as well  Continue metformin, sliding scale insulin  Dose of Humalog with meals a m  Lantus increased  Spoke with patient about the importance of having tighter control on his sugars  4  Left-sided chest wall pain- improved  Most likely due to coughing  Troponins were negative here  Patient has had recent cardiac catheterization which was within normal limits as per him  5  Hyponatremia -  most likely pseudohyponatremia from hyperglycemia  6  Bipolar affective disorder-continue Seroquel XR, Effexor XR, Remeron  7  SHAVONNE -continue home CPAP  8  Essential HTN - continue Norvasc, lisinopril, Lopressor  9  Dyslipidemia-continue statin, tricor      VTE Pharmacologic Prophylaxis:   Pharmacologic: Enoxaparin (Lovenox)  Mechanical VTE Prophylaxis in Place: Yes    Patient Centered Rounds: I have performed bedside rounds with nursing staff today  Discussions with Specialists or Other Care Team Provider: Yes    Education and Discussions with Family / Patient:Yes    Time Spent for Care: 30 min  More than 50% of total time spent on counseling and coordination of care as described above      Current Length of Stay: 3 day(s)    Current Patient Status: Inpatient Discharge Plan: home    Code Status: Level 1 - Full Code      Subjective:     States cough and breathing is better  Cough is mostly dry    Objective:     Vitals:   Temp (24hrs), Av 1 °F (36 7 °C), Min:98 °F (36 7 °C), Max:98 1 °F (36 7 °C)    HR:  [71-99] 83  Resp:  [18-20] 18  BP: (115-134)/(62-69) 134/64  SpO2:  [90 %-97 %] 90 %  Body mass index is 38 9 kg/m²  Input and Output Summary (last 24 hours):     No intake or output data in the 24 hours ending 17    Physical Exam:     Physical Exam   Constitutional: He is oriented to person, place, and time  He appears well-developed and well-nourished  No distress  HENT:   Head: Normocephalic and atraumatic  Mouth/Throat: Oropharynx is clear and moist    Eyes: EOM are normal  Right eye exhibits no discharge  Left eye exhibits no discharge  Neck: Neck supple  No tracheal deviation present  Cardiovascular: Normal rate and regular rhythm  Pulmonary/Chest: Effort normal  No respiratory distress  He has wheezes (intermittent)  He has no rales  Decreased breath sounds B/L   Abdominal: Soft  Bowel sounds are normal  He exhibits no distension  There is no tenderness  Musculoskeletal: He exhibits edema (trace B/L L  E)  Neurological: He is alert and oriented to person, place, and time  He has normal strength  No cranial nerve deficit  Skin: He is not diaphoretic  Psychiatric: He has a normal mood and affect  Additional Data:     Labs:      Results from last 7 days  Lab Units 17  0557  17  0927   WBC Thousand/uL 11 00*  < > 6 50   HEMOGLOBIN g/dL 14 7  < > 14 3   HEMATOCRIT % 44 0  < > 41 9*   PLATELETS Thousands/uL 186  < > 179   NEUTROS PCT %  --   --  57   LYMPHS PCT %  --   --  35   MONOS PCT %  --   --  7   EOS PCT %  --   --  1   < > = values in this interval not displayed      Results from last 7 days  Lab Units 17  0549   SODIUM mmol/L 130*   POTASSIUM mmol/L 4 6   CHLORIDE mmol/L 93*   CO2 mmol/L 26   BUN mg/dL 22   CREATININE mg/dL 0 96   CALCIUM mg/dL 9 5   GLUCOSE RANDOM mg/dL 314*           * I Have Reviewed All Lab Data Listed Above  * Additional Pertinent Lab Tests Reviewed: [facility] Admission Reviewed    Imaging:  Xr Chest 2 Views    Result Date: 11/6/2017  Narrative: CHEST - DUAL ENERGY INDICATION:  Shortness of breath, cough for one week  Weakness  COMPARISON:  September 6, 2017 VIEWS:  PA (including soft tissue/bone algorithms) and lateral projections IMAGES:  4 FINDINGS:     Stable cardiomegaly  Linear airspace opacities right lung base stable from prior study may represents atelectasis or scarring  Visualized osseous structures appear within normal limits for the patient's age  Impression: Linear airspace abnormality at the right lung base similar to prior study may represent scarring or atelectasis  Stable cardio megaly  Workstation performed: [id number]     Imaging Reports Reviewed by myself    Cultures:   Blood Culture:   Lab Results   Component Value Date    BLOODCX No Growth After 5 Days  09/06/2017    BLOODCX No Growth After 5 Days  09/06/2017    BLOODCX Diphtheroids 03/20/2017    BLOODCX No Growth After 5 Days  03/20/2017    BLOODCX No Growth After 5 Days  01/12/2016    BLOODCX No Growth After 5 Days   01/12/2016     Urine Culture:   Lab Results   Component Value Date    URINECX 1376-1552 cfu/ml Mixed Contaminants X2 03/20/2017    URINECX No Growth <1000 cfu/mL 11/27/2016    URINECX No Growth <1000 cfu/mL 09/02/2016     Sputum Culture: No components found for: SPUTUMCX  Wound Culture: No results found for: WOUNDCULT    Last 24 Hours Medication List:     amLODIPine 5 mg Oral BID   aspirin 81 mg Oral Daily   azithromycin 500 mg Oral Q24H   Cariprazine HCl 4 5 mg Oral HS   enoxaparin 40 mg Subcutaneous Daily   fenofibrate 168 mg Oral Daily   fluticasone-salmeterol 1 puff Inhalation Q12H [facility]   hydrOXYzine HCL 50 mg Oral HS   insulin glargine 60 Units Subcutaneous HS   insulin lispro 1-5 Units Subcutaneous HS   insulin lispro 1-6 Units Subcutaneous TID AC   [START ON 11/10/2017] insulin lispro 20 Units Subcutaneous BID before breakfast/lunch   [START ON 11/10/2017] insulin lispro 22 Units Subcutaneous Daily With Dinner   ipratropium-albuterol 3 mL Nebulization Q6H   lisinopril 20 mg Oral Daily   metFORMIN 500 mg Oral HS   methylPREDNISolone sodium succinate 20 mg Intravenous Q8H [address]   metoprolol tartrate 25 mg Oral Q12H [place]   mirtazapine 30 mg Oral HS   pantoprazole 40 mg Oral Early Morning   pravastatin 40 mg Oral Daily With Dinner   pregabalin 50 mg Oral TID   QUEtiapine 800 mg Oral HS   venlafaxine 150 mg Oral Daily        Today, Patient Was Seen By: Rosy Guillen DO    ** Please Note: Dragon 360 Dictation voice to text software may have been used in the creation of this document   **

## 2017-11-13 ENCOUNTER — GENERIC CONVERSION - ENCOUNTER (OUTPATIENT)
Dept: OTHER | Facility: OTHER | Age: 57
End: 2017-11-13

## 2017-11-15 ENCOUNTER — ALLSCRIPTS OFFICE VISIT (OUTPATIENT)
Dept: OTHER | Facility: OTHER | Age: 57
End: 2017-11-15

## 2017-11-20 ENCOUNTER — ALLSCRIPTS OFFICE VISIT (OUTPATIENT)
Dept: OTHER | Facility: OTHER | Age: 57
End: 2017-11-20
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
## 2017-12-07 ENCOUNTER — GENERIC CONVERSION - ENCOUNTER (OUTPATIENT)
Dept: OTHER | Facility: OTHER | Age: 57
End: 2017-12-07

## 2017-12-18 ENCOUNTER — GENERIC CONVERSION - ENCOUNTER (OUTPATIENT)
Dept: OTHER | Facility: OTHER | Age: 57
End: 2017-12-18

## 2018-01-09 NOTE — MISCELLANEOUS
History of Present Illness  COPD Hospital Discharge Initial Follow-Up Call: The patient is being contacted for follow-up after hospitalization  The date of discharge is 11/10/2017  I spoke with patient, Cody Veronica   The FEV1 is 81 %  The patient is a former smoker with a  pack year history  The patient quit smoking in 2001  The patient is experiencing the following symptoms: wheezing, cough and that is clear/white, but no dyspnea and no fever  with oxygen at 2 LPM  Oxygen is used at bedtime only  Zone tool status is yellow  The following topics were reviewed:  rescue vs  maintenance inhalers, COPD zone tool: when to take action, energy conservation, physician follow-ups and medication compliance  The patient felt ready to be discharged from the hospital  The patient was not given written &/or veral educational materials specific to his COPD diagnosis  The patient was not given the opportunity to walk in the hallways with staff assistance to assess his breathing status and review breathing techniques to help prevent increasing shortness of breath  The patient was instructed on when to return to his normal activities  The staff offered to assist the patient in making follow-up appointments with his family doctor or other specialists  Narrative Summary:  Alonzo reports much improvement in his symptoms since admission  Utilizing nebs 3-4x/day and Advair  Tapering prednisone starting at 40 mg and is knowledgeable re: tapering schedule  Has followup pulm appt on Weds with KALPESH Gomez, will be atteding  Current Meds    1  AmLODIPine Besylate 5 MG Oral Tablet; Take one tablet two times per day  Requested   for: 23Oct2017; Last Rx:83Sjp9822 Ordered   2  Aspirin 81 MG CAPS; take 1 capsule daily; Therapy: (Recorded:09Jan2017) to Recorded   3  Lisinopril 20 MG Oral Tablet; take 1 tablet by mouth once daily;    Therapy: 72DFZ3577 to (Tito Alcocer)  Requested for: 19BLV3403; [facility]   [facility] Or[facility]   4  Metoprolol Tartrate 25 MG Oral Tablet; Take one tablet twice daily; Therapy: 58TVS0379 to (Evaluate:2017)  Requested for: 2017; Last   Rx:65Mwy1335 Ordered    5  Advair Diskus 250-50 MCG/DOSE Inhalation Aerosol Powder Breath Activated; INHALE 1   PUFF TWICE DAILY; Therapy: 28FEU1563 to (Last Rx:2016)  Requested for: 83DIC3129 Ordered   6  AirDuo RespiClick 575/03 014-13 MCG/ACT Inhalation Aerosol Powder Breath Activated;   1 puff every 12 hours; Therapy: 09Scm5881 to (Evaluate:2018)  Requested for: 64Ili3821; Last   Rx:41Glh9174 Ordered    7  MetFORMIN HCl - 500 MG Oral Tablet; 1 Every Day At Bedtime; Therapy: 25Lqy6782 to (Last Rx:42Cfr8708)  Requested for: 2017 Ordered   8  NovoLOG FlexPen 100 UNIT/ML Subcutaneous Solution Pen-injector; INJECT 14 UNITS   SUBQ WITH BREAKFAST AND LUNCH AND 16 UNITS IN THEEVENING; Therapy: 26LUD2453 to ([id number])  Requested for: 2017; Last   Rx:47Lhe2435 Ordered    9  Lyrica 50 MG Oral Capsule; TAKE 1 CAPSULE 3 TIMES DAILY; Therapy: 70Ide4474 to (Evaluate:62Cku4661)  Requested for: 08Rkj8689; Last   Rx:28Byt0101 Ordered    10  Omeprazole 40 MG Oral Capsule Delayed Release; one cap po qd; Therapy: 02JDC1118 to (Last Rx:51Ian1747)  Requested for: 2017 Ordered    11  Fenofibrate 160 MG Oral Tablet; 1 every day; Therapy: 27JLL2052 to (Last Sonja Rathke)  Requested for: 2017 Ordered   12  Lovastatin 40 MG Oral Tablet; One tab po qd; Therapy: 47IHC4844 to (Last Sonja Rathke)  Requested for: 2017 Ordered    13  BD Pen Needle Mini U/F 31G X 5 MM Miscellaneous; USE 4 TIMES A DAY; Therapy: 95LDP9527 to (Last Rx:2017)  Requested for: 2017 Ordered   14  Lantus SoloStar 100 UNIT/ML Subcutaneous Solution Pen-injector; increase to 40 units    in evening MDD:15 TDD:15;    Therapy: 93Gdc8974 to (Last Rx:2017)  Requested for: 2017 Ordered    15   Aram Contour Test In [employer]; TEST 3 TIMES DAILY; Therapy: 84YPG9609 to (Evaluate:21Sep2015)  Requested for: 36XBB6245; Last    Rx:25Mar2015 Ordered   16  Victoza 18 MG/3ML Subcutaneous Solution Pen-injector; administer 0 6mg/day; Therapy: 86OUI3879 to (Last Rx:16Oct2017)  Requested for: 15ARF1185 Ordered    17  Vitamin D (Ergocalciferol) 74872 UNIT Oral Capsule; TAKE 1 CAPSULE BY MOUTH    EVERY WEEK; Therapy: 69PHY2435 to (Evaluate:15Jan2018)  Requested for: 84FNO7988; Last    Rx:94Gha9371 Ordered    18  Effexor  MG Oral Capsule Extended Release 24 Hour (Venlafaxine HCl ER); TAKE    1 CAPSULE ONCE DAILY WITH FOOD; Therapy: () to Recorded   19  QUEtiapine Fumarate 50 MG Oral Tablet; Take 1 tablet twice daily; Therapy: (Recorded:30Nms9735) to Recorded   20  SEROquel 100 MG Oral Tablet (QUEtiapine Fumarate); TAKE 1 TABLET AT BEDTIME; Therapy: (Recorded:74Lfo5533) to Recorded   21  SEROquel 300 MG Oral Tablet (QUEtiapine Fumarate); TAKE 2 TABLETS AT BEDTIME; Therapy: (Recorded:07Nfx2326) to Recorded   22  Venlafaxine HCl  MG Oral Capsule Extended Release 24 Hour; TAKE 1 CAPSULE     EVERY MORNING; Therapy: (Jw Vidal) to Recorded   23  Venlafaxine HCl ER 75 MG Oral Tablet Extended Release 24 Hour; Take 1 tablet daily; Therapy: (Recorded:75Cpx1772) to Recorded   24  Vraylar 3 MG Oral Capsule; TAKE 1 CAPSULE Bedtime; Therapy: (Jw Vidal) to Recorded   25  Wellbutrin  MG Oral Tablet Extended Release 24 Hour (BuPROPion HCl ER (XL));    TAKE 1 TABLET  1 tab dailt for a week, 2 tablets daily after that; Therapy: (Recorded:16Oct2017) to Recorded   26  Xanax 2 MG Oral Tablet (ALPRAZolam); TAKE 1 TABLET AT BEDTIME; Therapy: (Recorded:66Muu6371) to Recorded    Allergies    1   Wellbutrin TABS    Future Appointments    Date/Time Provider Specialty Site   11/15/2017 08:15 AM RATNA Calvert Pulmonary Medicine [facility]   02/26/2018 09:30 AM Darren March, APN Pulmonary Medicine [facility]     Signatures   Electronically signed by : Wu Potter, ; Nov 13 2017 11:07AM EST                       (Author)
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
## 2018-01-11 NOTE — RESULT NOTES
Verified Results  (1) HIV AG/AB Sierra Bergman, 4TH GEN 88Hvb6881 09:11AM Moises Ruggiero Order Number: [id number]      This test detects HIV 1, HIV2 and p24 Antigen       Test Name Result Flag Reference   HIV 1/2 AND P24 Non-reactive  Non-reactive

## 2018-01-11 NOTE — RESULT NOTES
Verified Results  (1) TSH WITH FT4 REFLEX 52VHH9549 08:49AM Scotty Blind   TW Order Number: [id number]     Test Name Result Flag Reference   TSH 0 775 uIU/mL  0 358-3 740   Patients undergoing fluorescein dye angiography may retain small amounts of fluorescein in the body for 48-72 hours post procedure  Samples containing fluorescein can produce falsely depressed TSH values  If the patient had this procedure,a specimen should be resubmitted post fluorescein clearance  (1) HEMOGLOBIN A1C 99Wox4818 08:49AM Scotty Blind   TW Order Number: [id number]     Test Name Result Flag Reference   HEMOGLOBIN A1C 7 3 % H 4 2-6 3   EST  AVG   GLUCOSE 163 mg/dl

## 2018-01-12 VITALS
DIASTOLIC BLOOD PRESSURE: 80 MMHG | HEART RATE: 70 BPM | HEIGHT: 70 IN | RESPIRATION RATE: 16 BRPM | BODY MASS INDEX: 38.37 KG/M2 | TEMPERATURE: 97.8 F | SYSTOLIC BLOOD PRESSURE: 140 MMHG | WEIGHT: 268 LBS
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
## 2018-01-12 NOTE — MISCELLANEOUS
Message   Recorded as Task   Date: 04/05/2016 07:31 AM, Created By: Flor Seth   Task Name: Follow Up   Assigned To: Flor Seth   Regarding Patient: Carla Russell, Status: Active   CommentNorene Heart - 05 Apr 2016 7:31 AM     TASK CREATED  Please contact patient, needs a diabetic appointment   Naheed Mccloud - 05 Apr 2016 10:50 AM     TASK REPLIED TO: Previously Assigned To [address] [place] [address] Drive  Had DM check 3/1/16   Message for patient to return my call      Signatures   Electronically signed by : HIREN Douglas ; Apr 5 2016 12:40PM EST                       (Author)
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
## 2018-01-13 VITALS
HEART RATE: 67 BPM | SYSTOLIC BLOOD PRESSURE: 138 MMHG | WEIGHT: 270 LBS | BODY MASS INDEX: 38.65 KG/M2 | TEMPERATURE: 97.4 F | RESPIRATION RATE: 20 BRPM | HEIGHT: 70 IN | DIASTOLIC BLOOD PRESSURE: 82 MMHG | OXYGEN SATURATION: 95 %

## 2018-01-13 VITALS
BODY MASS INDEX: 37.8 KG/M2 | OXYGEN SATURATION: 93 % | DIASTOLIC BLOOD PRESSURE: 84 MMHG | HEIGHT: 70 IN | RESPIRATION RATE: 22 BRPM | HEART RATE: 98 BPM | SYSTOLIC BLOOD PRESSURE: 160 MMHG | TEMPERATURE: 97.6 F | WEIGHT: 264 LBS

## 2018-01-13 VITALS
HEART RATE: 64 BPM | BODY MASS INDEX: 39.8 KG/M2 | WEIGHT: 278 LBS | HEIGHT: 70 IN | RESPIRATION RATE: 22 BRPM | TEMPERATURE: 97.2 F | SYSTOLIC BLOOD PRESSURE: 130 MMHG | DIASTOLIC BLOOD PRESSURE: 78 MMHG

## 2018-01-13 NOTE — MISCELLANEOUS
History of Present Illness  COPD Hospital Discharge Initial Follow-Up Call: The patient is being contacted for follow-up after hospitalization  The date of discharge is 3/26/2017  I spoke with Elina Santo   Patient was identified as medium risk for readmission per risk stratification  The patient was discharged to home with [address] [place] [address] Way  The FEV1 is 81 %  The Gold Stage of COPD is mild  The patient is experiencing the following symptoms: no wheezing and no cough  Zone tool status is yellow  Follow-up appointment(s) were made with the PCP on [facility] 4/3 and the Pulmonary Provider on [facility] 4/5  The following topics were reviewed:  rescue vs  maintenance inhalers, energy conservation, physician follow-ups and medication compliance  The patient felt ready to be discharged from the hospital  The patient was not given written &/or veral educational materials specific to his COPD diagnosis  The patient was not instructed on when to return to his normal activities  The staff did not offer to assist the patient in making follow-up appointments with his family doctor or other specialists  Narrative Summary:  Alonzo reports much improvement with symptoms  Has made all followup appts and obtained medications  Likely suffers from more restrictive disease due to his body habitus  Last FEV1 81% with a normal obstructive ratio  Placed call to [facility] to [facility] order  they are awaiting face to face and discharge info  Relayed needs back to IP CM team at [facility]  Current Meds    1  LevoFLOXacin 500 MG Oral Tablet (Levaquin); 1 every day; Therapy: 70JCE0697 to (Last Rx:23Jan2017)  Requested for: 23Jan2017 Ordered   2  Promethazine-Codeine 6 25-10 MG/5ML Oral Syrup; TAKE 5 ML EVERY 4 TO 6 HOURS   AS NEEDED FOR COUGH; Therapy: 70IWU5873 to (Evaluate:31Jan2017); Last Rx:23Jan2017 Ordered    3   Oxycodone-Acetaminophen 5-325 MG Oral Tablet (Percocet); take 1 tablet every 6 hours   as needed for pain; Therapy: 28NTZ3239 to (Evaluate:33Wkx0346); Last Rx:30Nov2016 Ordered    4  AmLODIPine Besylate 5 MG Oral Tablet; Take one tablet two times per day  Requested   for: 50IGP9988; Last Rx:06Jan2017 Ordered   5  Aspirin 81 MG CAPS; take 1 capsule daily; Therapy: (EHAQKWGX:57TLW7870) to Recorded   6  Lisinopril 20 MG Oral Tablet; TAKE ONE TABLET BY MOUTH ONCE DAILY; Therapy: (DHIHSRTQ:96YLB8098) to Recorded   7  Metoprolol Tartrate 25 MG Oral Tablet; take 1 tablet bid  Requested for: 21Jan2017; Last   Rx:20Jan2017 Ordered    8  Advair Diskus 250-50 MCG/DOSE Inhalation Aerosol Powder Breath Activated; INHALE 1   PUFF TWICE DAILY; Therapy: 10ZTQ2688 to (Last Rx:08Jun2016)  Requested for: 61BRN2871 Ordered   9  ProAir  (90 Base) MCG/ACT Inhalation Aerosol Solution; INHALE 2 PUFFS FOUR   TIMES DAILY AS DIRECTED; Therapy: 83QZT1694 to (Last Rx:08Jun2016)  Requested for: 56YDF6636 Ordered    10  Dexilant 60 MG Oral Capsule Delayed Release; 1 EVERY MORNING; Therapy: 35AIH4019 to (Evaluate:02Ulm2276)  Requested for: 06YPL4844; Last    Rx:19Jan2017 Ordered    11  MetFORMIN HCl - 1000 MG Oral Tablet; TAKE 1 TABLET EVERY 12 HOURS; Therapy: 55CYN9915 to (Evaluate:23Sep2017)  Requested for: 33DCO0550; Last    Rx:28Sep2016 Ordered   12  NovoLOG FlexPen 100 UNIT/ML Subcutaneous Solution Pen-injector; 14 units    subcutaneous with meals; 16 units with evening meal;    Therapy: 74BCL7965 to (Last Rx:29Dec2016)  Requested for: 51KBC6317 Ordered    13  Lyrica 50 MG Oral Capsule; TAKE 1 CAPSULE 3 TIMES DAILY; Therapy: 58Tlg3565 to (Evaluate:38Cou6866)  Requested for: 94Qiy9111; Last    Rx:21Dec2016 Ordered    14  Fenofibrate 160 MG Oral Tablet; 1 every day; Therapy: 37RWA0377 to (Last Rx:28Sep2016)  Requested for: 66ZON0755 Ordered   15  Lovastatin 40 MG Oral Tablet; 1 Every Day At Bedtime; Therapy: 24AKL0149 to (Last Rx:30Dec2016)  Requested for: 30Dec2016 Ordered    16   LamoTRIgine 100 MG Oral Tablet; TAKE 1 TABLET DAILY; Therapy: 06ERJ2605 to (Evaluate:51Fkv2758)  Requested for: 05MSP2478; Last    Rx:53Jtb3476 Ordered    17  Triamcinolone Acetonide 0 1 % External Cream; apply BID; Therapy: 20VQO0646 to (Last Rx:08Sep2015)  Requested for: 08Sep2015 Ordered    18  BD Pen Needle Mini U/F 31G X 5 MM Miscellaneous; USE 4 TIMES A DAY; Therapy: 15DJF0374 to (Last Karrie Robbiche)  Requested for: 28Jun2016 Ordered   19  Lantus SoloStar 100 UNIT/ML Subcutaneous Solution Pen-injector; 30 units in evening    MDD:15 TDD:15;    Therapy: 19Vax2218 to (Last Rx:28Sep2016)  Requested for: 26FMT5913 Ordered    20  Aram Contour Test In [employer]; TEST 3 TIMES DAILY; Therapy: 20XBL8214 to (Evaluate:21Sep2015)  Requested for: 28HAN0696; Last    Rx:25Mar2015 Ordered    21  Vitamin D (Ergocalciferol) 90910 UNIT Oral Capsule; TAKE 1 CAPSULE WEEKLY; Therapy: 20EGD8529 to (Last Rx:21Oct2016)  Requested for: 62Mml3174 Ordered    22  Cyclobenzaprine HCl - 10 MG Oral Tablet; Therapy: (Orvis Call) to Recorded   23  Effexor  MG Oral Capsule Extended Release 24 Hour (Venlafaxine HCl ER); TAKE    1 CAPSULE ONCE DAILY WITH FOOD; Therapy: (77 385 106) to Recorded   24  GaviLyte-N with Flavor Pack 420 GM Oral Solution Reconstituted; Therapy: (Orvis Call) to Recorded   25  LaMICtal  MG Oral Tablet Dispersible (LamoTRIgine); Therapy: (Recorded:86Cnp8460) to Recorded   26  Phentermine HCl - 37 5 MG Oral Capsule; take 1 capsule daily; Therapy: (Orvis Call) to Recorded   27  QUEtiapine Fumarate 50 MG Oral Tablet; Take 1 tablet twice daily; Therapy: (Recorded:08Gjz6940) to Recorded   28  SEROquel 100 MG Oral Tablet (QUEtiapine Fumarate); TAKE 1 TABLET AT BEDTIME; Therapy: (Recorded:53Ucb9906) to Recorded   29  SEROquel 300 MG Oral Tablet (QUEtiapine Fumarate); TAKE 2 TABLETS AT BEDTIME; Therapy: (Recorded:20Klw2437) to Recorded   30   Venlafaxine HCl  MG Oral Capsule Extended Release 24 Hour; TAKE 1 CAPSULE     EVERY MORNING; Therapy: (Ethyl Rung) to Recorded   31  Venlafaxine HCl ER 75 MG Oral Tablet Extended Release 24 Hour; Take 1 tablet daily; Therapy: (Recorded:49Ogj1683) to Recorded   32  Vraylar 3 MG Oral Capsule; TAKE 1 CAPSULE Bedtime; Therapy: (Ethyl Rung) to Recorded   33  Zolpidem Tartrate 10 MG Oral Tablet; 1 Every Day At Bedtime, As Needed; Therapy: (Recorded:62Ubk4263) to Recorded    Allergies    1  Wellbutrin TABS    Future Appointments    Date/Time Provider Specialty Site   04/03/2017 10:45 AM HIREN Sanford   [address] [place] [address]   04/05/2017 09:00 AM RATNA Robins Pulmonary [facility]     Signatures   Electronically signed by : Fallon Terry, ; Mar 27 2017 12:43PM EST                       (Author)
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
## 2018-01-13 NOTE — RESULT NOTES
Verified Results  Blood Glucose- POC 63NFR2339 12:00AM Flor Poser     Test Name Result Flag Reference   Glucose Finger Stick 258       Urine Dip Automated- POC 28JXX9993 12:00AM [facility]     Test Name Result Flag Reference   Color Yellow     Clarity Transparent     Leukocytes neg     Nitrite neg     Blood neg     Bilirubin neg     Urobilinogen norm     Protein neg     Ph 6     Specific Gravity 1 015     Ketone neg     Glucose >1000     Color Yellow     Clarity Transparent     Leukocytes neg     Nitrite neg     Blood neg     Bilirubin neg     Urobilinogen norm     Protein neg     Ph 6     Specific Gravity 1 015     Ketone neg     Glucose >1000

## 2018-01-14 VITALS
SYSTOLIC BLOOD PRESSURE: 148 MMHG | RESPIRATION RATE: 20 BRPM | WEIGHT: 279 LBS | HEART RATE: 78 BPM | BODY MASS INDEX: 39.94 KG/M2 | TEMPERATURE: 95.1 F | HEIGHT: 70 IN | DIASTOLIC BLOOD PRESSURE: 84 MMHG

## 2018-01-14 VITALS
HEART RATE: 80 BPM | TEMPERATURE: 97.3 F | HEIGHT: 70 IN | DIASTOLIC BLOOD PRESSURE: 86 MMHG | BODY MASS INDEX: 40.37 KG/M2 | SYSTOLIC BLOOD PRESSURE: 138 MMHG | WEIGHT: 282 LBS | RESPIRATION RATE: 20 BRPM | OXYGEN SATURATION: 95 %

## 2018-01-14 VITALS
DIASTOLIC BLOOD PRESSURE: 78 MMHG | RESPIRATION RATE: 16 BRPM | HEIGHT: 70 IN | TEMPERATURE: 97.6 F | BODY MASS INDEX: 39.22 KG/M2 | SYSTOLIC BLOOD PRESSURE: 150 MMHG | WEIGHT: 274 LBS | HEART RATE: 72 BPM

## 2018-01-14 NOTE — RESULT NOTES
Verified Results  Urine Dip Automated- POC 13AZC4270 11:18AM Krishna Ralph     Test Name Result Flag Reference   Color Clear     Clarity Transparent     Leukocytes neg     Nitrite neg     Blood neg     Bilirubin ng     Urobilinogen norm     Protein neg     Ph 5     Specific Gravity 1 015     Ketone neg     Glucose norm         Plan  Diabetes mellitus type 2, uncontrolled    · Urine Dip Automated- POC; Status:Complete;   Done: 92OEK7293 11:18AM  Type 2 diabetes mellitus    · From  Lantus SoloStar 100 UNIT/ML Subcutaneous Solution Pen-injector 30  units in evening MDD:15 TDD:15 To Lantus SoloStar 100 UNIT/ML Subcutaneous  Solution Pen-injector 36 units in evening MDD:15 TDD:15  Type 2 diabetes mellitus with hyperglycemia    · Victoza 18 MG/3ML Subcutaneous Solution Pen-injector; administer 0 6mg/day

## 2018-01-15 VITALS
HEART RATE: 100 BPM | RESPIRATION RATE: 16 BRPM | BODY MASS INDEX: 37.51 KG/M2 | SYSTOLIC BLOOD PRESSURE: 170 MMHG | OXYGEN SATURATION: 94 % | TEMPERATURE: 97.1 F | WEIGHT: 262 LBS | DIASTOLIC BLOOD PRESSURE: 90 MMHG | HEIGHT: 70 IN

## 2018-01-15 NOTE — RESULT NOTES
Verified Results  (1) COMPREHENSIVE METABOLIC PANEL 15SAX1980 87:72IS Colby Lopez Order Number: [id number]      National Kidney Disease Education Program recommendations are as follows:  GFR calculation is accurate only with a steady state creatinine  Chronic Kidney disease less than 60 ml/min/1 73 sq  meters  Kidney failure less than 15 ml/min/1 73 sq  meters  Test Name Result Flag Reference   GLUCOSE,RANDM 332 mg/dL H    If the patient is fasting, the ADA then defines impaired fasting glucose as > 100 mg/dL and diabetes as > or equal to 123 mg/dL  SODIUM 137 mmol/L  136-145   POTASSIUM 4 5 mmol/L  3 5-5 3   CHLORIDE 97 mmol/L L 100-108   CARBON DIOXIDE 28 mmol/L  21-32   ANION GAP (CALC) 12 mmol/L  4-13   BLOOD UREA NITROGEN 20 mg/dL  5-25   CREATININE 1 10 mg/dL  0 60-1 30   Standardized to IDMS reference method   CALCIUM 9 8 mg/dL  8 3-10 1   BILI, TOTAL 0 30 mg/dL  0 20-1 00   ALK PHOSPHATAS 94 U/L     ALT (SGPT) 60 U/L  12-78   AST(SGOT) 26 U/L  5-45   ALBUMIN 4 4 g/dL  3 5-5 0   TOTAL PROTEIN 7 6 g/dL  6 4-8 2   eGFR Non-African American      >60 0 ml/min/1 73sq m     (1) LIPID PANEL, FASTING 04Apr2016 09:11AM Colby Lopez Order Number: [id number]      Triglyceride:         Normal              <150 mg/dl       Borderline High    150-199 mg/dl       High               200-499 mg/dl       Very High          >499 mg/dl  Cholesterol:         Desirable        <200 mg/dl      Borderline High  200-239 mg/dl      High             >239 mg/dl  HDL Cholesterol:        High    >59 mg/dL      Low     <41 mg/dL     Test Name Result Flag Reference   CHOLESTEROL 206 mg/dL H    HDL,DIRECT 60 mg/dL  40-60   Specimen collection should occur prior to Metamizole administration due to the potential for falsely depressed results     LDL CHOLESTEROL CALCULATED 85 mg/dL  0-100   TRIGLYCERIDES 305 mg/dL H <=150   Specimen collection should occur prior to N-Acetylcysteine or Metamizole administration due to the potential for falsely depressed results  (1) VITAMIN D 25-HYDROXY 04Apr2016 09:11AM Payal Forte Order Number: [id number]     Order Number: [id number]     Test Name Result Flag Reference   VIT D 25-HYDROX 17 9 ng/mL L 30 0-100 0     (1) HEMOGLOBIN A1C 04Apr2016 09:11AM Payal Forte Order Number: [id number]      5 7-6 4% impaired fasting glucose  >=6 5% diagnosis of diabetes    Falsely low levels are seen in conditions linked to short RBC life span-  hemolytic anemia, and splenomegaly  Falsely elevated levels are seen in situations where there is an increased production of RBC- receipt of erythropoietin or blood transfusions  Adopted from ADA-Clinical Practice Recommendations     Test Name Result Flag Reference   HEMOGLOBIN A1C 8 1 % H 4 0-5 6   EST  AVG   GLUCOSE 186 mg/dl         Discussion/Summary   Very poor diabetes control, please make appointment as soon as possible   Stable cholesterol, triglycerides remain elevated   Very low vitamin D, need replacement, please make appointment   Poor diabetes control, please make appointment
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
## 2018-01-15 NOTE — RESULT NOTES
Verified Results  (1) CBC/PLT/DIFF 41Ghc0838 08:06AM Kana Newman   TW Order Number: [id number]     Test Name Result Flag Reference   WBC COUNT 8 10 Thousand/uL  4 80-10 80   WBC ADJUSTED 8 10 Thousand/uL  4 80-10 80   RBC COUNT 4 69 Million/uL L 4 70-6 10   HEMOGLOBIN 14 7 g/dL  14 0-18 0   HEMATOCRIT 42 7 %  42 0-52 0   MCV 91 fL  82-98   MCH 31 4 pg H 27 0-31 0   MCHC 34 5 g/dL  31 4-37 4   RDW 13 3 %  11 6-15 1   MPV 7 7 fL L 8 9-12 7   PLATELET COUNT 216 Thousands/uL  130-400   NEUTROPHILS RELATIVE PERCENT 54 %  43-75   LYMPHOCYTES RELATIVE PERCENT 39 %  14-44   MONOCYTES RELATIVE PERCENT 6 %  4-12   EOSINOPHILS RELATIVE PERCENT 1 %  0-6   BASOPHILS RELATIVE PERCENT 0 %  0-1   NEUTROPHILS ABSOLUTE COUNT 4 40 Thousands/?L  1 85-7 62   LYMPHOCYTES ABSOLUTE COUNT 3 10 Thousands/?L  0 60-4 47   MONOCYTES ABSOLUTE COUNT 0 50 Thousand/?L  0 17-1 22   EOSINOPHILS ABSOLUTE COUNT 0 10 Thousand/?L  0 00-0 61   BASOPHILS ABSOLUTE COUNT 0 00 Thousands/?L  0 00-0 10   - Patient Instructions: This bloodwork is non-fasting  Please drink two glasses of water morning of bloodwork  (1) COMPREHENSIVE METABOLIC PANEL 45JXK9766 40:10KZ Kana Newman   TW Order Number: [id number]     Test Name Result Flag Reference   GLUCOSE,RANDM 169 mg/dL H    If the patient is fasting, the ADA then defines impaired fasting glucose as > 100 mg/dL and diabetes as > or equal to 123 mg/dL     SODIUM 137 mmol/L  136-145   POTASSIUM 4 5 mmol/L  3 5-5 3   CHLORIDE 99 mmol/L L 100-108   CARBON DIOXIDE 29 mmol/L  21-32   ANION GAP (CALC) 9 mmol/L  4-13   BLOOD UREA NITROGEN 12 mg/dL  5-25   CREATININE 0 91 mg/dL  0 60-1 30   Standardized to IDMS reference method   CALCIUM 8 9 mg/dL  8 3-10 1   BILI, TOTAL 0 30 mg/dL  0 20-1 00   ALK PHOSPHATAS 53 U/L     ALT (SGPT) 36 U/L  12-78   AST(SGOT) 14 U/L  5-45   ALBUMIN 4 1 g/dL  3 5-5 0   TOTAL PROTEIN 7 3 g/dL  6 4-8 2   eGFR Non-      >60 0 ml/min/1 73sq m   [facility] Disease Education Program recommendations are as follows:  GFR calculation is accurate only with a steady state creatinine  Chronic Kidney disease less than 60 ml/min/1 73 sq  meters  Kidney failure less than 15 ml/min/1 73 sq  meters       (1) MAGNESIUM 08Cwa2619 08:06AM [place] Order Number: [id number]     Test Name Result Flag Reference   MAGNESIUM 1 7 mg/dL  1 6-2 6     (1) STEVEN-TAM VIRUS VCA, IGM 73Ror7982 08:06AM [place] Order Number: [id number]     Test Name Result Flag Reference   STEVEN-BARR VCA IGM <36 0 U/mL  0 0 - 35 9   Negative        <36 0                                   Equivocal 36 0 - 43 9                                   Positive        >43 9    Performed at:  [address] [place] [address]  [id number]  : Ulises Vegas MD, Phone:  [phone]     (1) STVEEN-BARR VIRUS NUCLEAR ANTIGEN ANTIBODY, IGG 06Ayt1954 08:06AM [place] Order Number: [id number]     Test Name Result Flag Reference   STEVEN-TAM NUCLEAR ANTIGEN AB IGG 65 3 U/mL H 0 0 - 17 9   Negative        <18 0                                   Equivocal 18 0 - 21 9                                   Positive        >21 9    Performed at:  [facility] [employer] [address]  [id number]  : Ulises Vegas MD, Phone:  [phone]
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
## 2018-01-18 NOTE — MISCELLANEOUS
History of Present Illness  COPD Hospital Discharge Initial Follow-Up Call: The patient is being contacted for follow-up after hospitalization  The date of discharge is 1/17/16  Left message on voice mail requesting return call  Narrative Summary:  Unable to reach patient on listed #  Verified in EPIC and [employer]  Left message on sister's cell phone #[phone]  Current Meds    1  Lisinopril 10 MG Oral Tablet; Take 1 tablet daily; Therapy: 86WJZ9455 to (Last Rx:02Oct2015)  Requested for: 29Cif1829 Ordered    2  QUEtiapine Fumarate 400 MG Oral Tablet; TAKE 2 TABLET Bedtime; Therapy: 67LQB8972 to (Evaluate:24Apr2015); Last Rx:25Mar2015 Ordered    3  MetFORMIN HCl - 1000 MG Oral Tablet; take 1 tablet by mouth twice daily; Therapy: 38KXM7582 to (Last Rx:16Oct2015)  Requested for: 71Pht7761 Ordered    4  NovoLOG FlexPen 100 UNIT/ML Subcutaneous Solution Pen-injector; 10 units   subcutaneous with meals; 14 units with evening meal;   Therapy: 55PKQ8367 to (Last Rx:30Nov2015)  Requested for: 57HBQ1770 Ordered    5  Lyrica 50 MG Oral Capsule; TAKE ONE CAPSULE BY MOUTH AT BEDTIME; Therapy: 03TME9369 to (Last Rx:53Cto1972)  Requested for: 32Rac7483 Ordered    6  Viagra 100 MG Oral Tablet; TAKE 1 TABLET DAILY 1 HOUR BEFORE NEEDED; Therapy: 15Xyn7905 to (Evaluate:03Rfm0940); Last Rx:12Ohp6020 Ordered    7  Fenofibrate 160 MG Oral Tablet; 1 every day; Therapy: 68SPJ8935 to (Last Rx:16Nov2015)  Requested for: 95EQU0742 Ordered   8  Lovastatin 40 MG Oral Tablet; 1 Every Day At Bedtime; Therapy: 43OQS7977 to (Last Rx:16Nov2015)  Requested for: 62QOD2425 Ordered    9  Effexor XR 75 MG Oral Capsule Extended Release 24 Hour (Venlafaxine HCl ER); TAKE 1   CAPSULE DAILY; Therapy: 95CLC1067 to (Evaluate:21Jan2015)  Requested for: 44Gou1985 Recorded   10  LamoTRIgine 100 MG Oral Tablet; TAKE 1 TABLET DAILY; Therapy: 56XNS9769 to (Evaluate:35Skz4712)  Requested for: 22QRI6351;  Last    FO:31SSX0473 Ordered    11  Triamcinolone Acetonide 0 1 % External Cream; apply BID; Therapy: 70JDF8522 to (Last Rx:08Sep2015)  Requested for: 08Sep2015 Ordered    12  BD Pen Needle Mini U/F 31G X 5 MM Miscellaneous; USE 4 TIMES A DAY; Therapy: 32TSP2136 to (Last Rx:25Mar2015)  Requested for: 25Mar2015 Ordered   13  Lantus SoloStar 100 UNIT/ML Subcutaneous Solution Pen-injector; 22 units in afternoon    MDD:15 TDD:15;    Therapy: 75Jug8189 to (Last Rx:27Oct2015)  Requested for: 56SDW2020 Ordered    14  Aram Contour Test In [employer]; TEST 3 TIMES DAILY; Therapy: 60HRU1922 to (Evaluate:21Sep2015)  Requested for: 78EHQ3689; Last    Rx:25Mar2015 Ordered    15  BD Pen Needle Mini U/F 31G X 5 MM Miscellaneous; use twice a day; Therapy: 69OKF1712 to  Requested for: 33HGN7614 Recorded   16  Vistaril 50 MG Oral Capsule (HydrOXYzine Pamoate); TAKE 1 CAPSULE AT BEDTIME; Therapy: 36Mli2277 to Recorded   17  Zolpidem Tartrate 10 MG Oral Tablet; 1 Every Day At Bedtime, As Needed; Therapy: (Recorded:14Jan2014) to Recorded    Allergies    1  Wellbutrin TABS    Future Appointments    Date/Time Provider Specialty Site   01/27/2016 02:30 PM HIREN Garcia  Family Medicine [address]     Signatures   Electronically signed by :  Esvin Vargas RT; Jan 20 2016 12:19PM EST                       (Author)

## 2018-01-18 NOTE — RESULT NOTES
Verified Results  NM GASTRIC EMPTYING 69Rgo1423 07:42AM Abiola Sousa     Test Name Result Flag Reference   NM GASTRIC EMPTYING (Report)     GASTRIC EMPTYING STUDY     INDICATION: Early satiety, diabetes      COMPARISON: None available     TECHNIQUE:  The study was performed following the oral administration of 1 09 mCi Tc-99m sulfur colloid combined with scrambled eggs, as part of a standard meal  Following the meal, one minute anterior and posterior images were obtained immediately and   at 0 25 hours, 0 5 hour, 1 hour, 1 5 hour, 2 hour, 3 hour and 4 hour intervals from the time of ingestion  Geometric mean analyses were then performed  As of March 1, 2016, this gastric emptying protocol has been modified and updated  The gastric    emptying times and the normal values reported below reflect the change in protocol  FINDINGS:     Gastric emptying at 0 5 hours = 10 (N < 30%)    Gastric emptying at 1 hour = 20 % (N = 10 - 70%)   Gastric emptying at 2 hours = 43 % (N = > 40%)   Gastric emptying at 3 hours = 73 % (N = > 70%)   Gastric emptying at 4 hours = 87 % (N = >90%)     Linear T-1/2 = 143 minutes         IMPRESSION:     Mildly decreased rate of gastric emptying         Workstation performed: [id number]     Signed by:   Roldan Hughes MD   2/20/17

## 2018-01-22 VITALS
RESPIRATION RATE: 12 BRPM | TEMPERATURE: 98.1 F | WEIGHT: 275 LBS | HEIGHT: 70 IN | DIASTOLIC BLOOD PRESSURE: 86 MMHG | OXYGEN SATURATION: 96 % | BODY MASS INDEX: 39.37 KG/M2 | HEART RATE: 73 BPM | SYSTOLIC BLOOD PRESSURE: 158 MMHG

## 2018-01-24 VITALS
WEIGHT: 292 LBS | RESPIRATION RATE: 24 BRPM | HEIGHT: 70 IN | HEART RATE: 84 BPM | DIASTOLIC BLOOD PRESSURE: 84 MMHG | TEMPERATURE: 97.4 F | BODY MASS INDEX: 41.8 KG/M2 | SYSTOLIC BLOOD PRESSURE: 134 MMHG

## 2018-01-27 ENCOUNTER — TELEPHONE (OUTPATIENT)
Dept: FAMILY MEDICINE CLINIC | Facility: CLINIC | Age: 58
End: 2018-01-27

## 2018-01-27 NOTE — TELEPHONE ENCOUNTER
Dr Mahsa Aguila,     Patient needs you to call Real Hernandez at [facility] for his diabetes machine  The number is [phone]

## 2018-02-02 NOTE — TELEPHONE ENCOUNTER
Dr Paula Huddleston pt ins will not pay for lantus and pt needs a different insulin called into pharmacy  Pt was taking 60 units of lantus   Pt will be out of meds this weekend

## 2018-02-05 ENCOUNTER — TELEPHONE (OUTPATIENT)
Dept: FAMILY MEDICINE CLINIC | Facility: CLINIC | Age: 58
End: 2018-02-05

## 2018-02-05 DIAGNOSIS — Z79.4 DIABETES MELLITUS TYPE 2, INSULIN DEPENDENT (HCC): Primary | ICD-10-CM

## 2018-02-05 DIAGNOSIS — E11.9 DIABETES MELLITUS TYPE 2, INSULIN DEPENDENT (HCC): Primary | ICD-10-CM

## 2018-02-05 NOTE — TELEPHONE ENCOUNTER
Spoke to pt Blood sugar is high 89 needs another med because of insurance , checking pharmacy  Now tc/cma

## 2018-02-05 NOTE — TELEPHONE ENCOUNTER
DR SIMMONS PT IS OUT OF LANTUS AS OF Friday,2/2  PT SUGAR THIS MORING    PT INS IS NOT PAYING FOR THE LANTUS AND PT NEEDS A NEW MED TO REPLACE THE LANTUS MED ASAP

## 2018-02-06 NOTE — TELEPHONE ENCOUNTER
Spoke wpt--advised that I will call pharmacy and approve covered insulin brand for pt to  today  Pt has appt for follow-up tomorrow  No other refills needed at this time  Pt with no other acute c/o

## 2018-02-07 DIAGNOSIS — E11.8 TYPE 2 DIABETES MELLITUS WITH COMPLICATION, WITH LONG-TERM CURRENT USE OF INSULIN (HCC): Primary | ICD-10-CM

## 2018-02-07 DIAGNOSIS — Z79.4 TYPE 2 DIABETES MELLITUS WITH COMPLICATION, WITH LONG-TERM CURRENT USE OF INSULIN (HCC): Primary | ICD-10-CM

## 2018-02-13 NOTE — TELEPHONE ENCOUNTER
Dr Jones 76,   Pt  Is requesting that you call Dany Cifuentes @ [facility] again re: glucometer

## 2018-02-15 RX ORDER — QUETIAPINE FUMARATE 100 MG/1
TABLET, FILM COATED ORAL
COMMUNITY
Start: 2017-12-15 | End: 2018-02-21 | Stop reason: DRUGHIGH

## 2018-02-15 RX ORDER — BUSPIRONE HYDROCHLORIDE 15 MG/1
15 TABLET ORAL 2 TIMES DAILY
COMMUNITY
Start: 2018-01-15 | End: 2020-05-05 | Stop reason: ALTCHOICE

## 2018-02-15 RX ORDER — QUETIAPINE FUMARATE 300 MG/1
TABLET, FILM COATED ORAL
COMMUNITY
Start: 2017-12-15 | End: 2018-02-21 | Stop reason: DRUGHIGH

## 2018-02-16 ENCOUNTER — TELEPHONE (OUTPATIENT)
Dept: FAMILY MEDICINE CLINIC | Facility: CLINIC | Age: 58
End: 2018-02-16

## 2018-02-16 NOTE — TELEPHONE ENCOUNTER
Pt is taking 60 units of levemir daily  He doesn't need refills yet he will discuss wed when he comes in for appoinment  tc/cma

## 2018-02-21 ENCOUNTER — OFFICE VISIT (OUTPATIENT)
Dept: FAMILY MEDICINE CLINIC | Facility: CLINIC | Age: 58
End: 2018-02-21
Payer: MEDICARE

## 2018-02-21 VITALS
HEART RATE: 78 BPM | TEMPERATURE: 96 F | RESPIRATION RATE: 24 BRPM | HEIGHT: 71 IN | SYSTOLIC BLOOD PRESSURE: 134 MMHG | BODY MASS INDEX: 40.88 KG/M2 | WEIGHT: 292 LBS | DIASTOLIC BLOOD PRESSURE: 92 MMHG

## 2018-02-21 DIAGNOSIS — E78.5 HYPERLIPIDEMIA ASSOCIATED WITH TYPE 2 DIABETES MELLITUS (HCC): ICD-10-CM

## 2018-02-21 DIAGNOSIS — E11.69 HYPERLIPIDEMIA ASSOCIATED WITH TYPE 2 DIABETES MELLITUS (HCC): ICD-10-CM

## 2018-02-21 DIAGNOSIS — E11.8 TYPE 2 DIABETES MELLITUS WITH COMPLICATION, WITH LONG-TERM CURRENT USE OF INSULIN (HCC): Primary | ICD-10-CM

## 2018-02-21 DIAGNOSIS — K21.9 GASTROESOPHAGEAL REFLUX DISEASE WITHOUT ESOPHAGITIS: ICD-10-CM

## 2018-02-21 DIAGNOSIS — I10 HYPERTENSION, ESSENTIAL: Chronic | ICD-10-CM

## 2018-02-21 DIAGNOSIS — Z79.4 TYPE 2 DIABETES MELLITUS WITH COMPLICATION, WITH LONG-TERM CURRENT USE OF INSULIN (HCC): Primary | ICD-10-CM

## 2018-02-21 LAB
CREAT UR-MCNC: 42.9 MG/DL
HBA1C MFR BLD HPLC: 8.4 %
MICROALBUMIN UR-MCNC: 6 MG/L (ref 0–20)
MICROALBUMIN/CREAT 24H UR: 14 MG/G CREATININE (ref 0–30)

## 2018-02-21 PROCEDURE — 82043 UR ALBUMIN QUANTITATIVE: CPT | Performed by: FAMILY MEDICINE

## 2018-02-21 PROCEDURE — 99214 OFFICE O/P EST MOD 30 MIN: CPT | Performed by: FAMILY MEDICINE

## 2018-02-21 PROCEDURE — 82570 ASSAY OF URINE CREATININE: CPT | Performed by: FAMILY MEDICINE

## 2018-02-21 NOTE — PATIENT INSTRUCTIONS
Obesity   AMBULATORY CARE:   Obesity  is when your body mass index (BMI) is greater than 30  Your healthcare provider will use your height and weight to measure your BMI  The risks of obesity include  many health problems, such as injuries or physical disability  You may need tests to check for the following:  · Diabetes     · High blood pressure or high cholesterol     · Heart disease     · Gallbladder or liver disease     · Cancer of the colon, breast, prostate, liver, or kidney     · Sleep apnea     · Arthritis or gout  Seek care immediately if:   · You have a severe headache, confusion, or difficulty speaking  · You have weakness on one side of your body  · You have chest pain, sweating, or shortness of breath  Contact your healthcare provider if:   · You have symptoms of gallbladder or liver disease, such as pain in your upper abdomen  · You have knee or hip pain and discomfort while walking  · You have symptoms of diabetes, such as intense hunger and thirst, and frequent urination  · You have symptoms of sleep apnea, such as snoring or daytime sleepiness  · You have questions or concerns about your condition or care  Treatment for obesity  focuses on helping you lose weight to improve your health  Even a small decrease in BMI can reduce the risk for many health problems  Your healthcare provider will help you set a weight-loss goal   · Lifestyle changes  are the first step in treating obesity  These include making healthy food choices and getting regular physical activity  Your healthcare provider may suggest a weight-loss program that involves coaching, education, and therapy  · Medicine  may help you lose weight when it is used with a healthy diet and physical activity  · Surgery  can help you lose weight if you are very obese and have other health problems  There are several types of weight-loss surgery  Ask your healthcare provider for more information    Be successful losing weight:   · Set small, realistic goals  An example of a small goal is to walk for 20 minutes 5 days a week  Anther goal is to lose 5% of your body weight  · Tell friends, family members, and coworkers about your goals  and ask for their support  Ask a friend to lose weight with you, or join a weight-loss support group  · Identify foods or triggers that may cause you to overeat , and find ways to avoid them  Remove tempting high-calorie foods from your home and workplace  Place a bowl of fresh fruit on your kitchen counter  If stress causes you to eat, then find other ways to cope with stress  · Keep a diary to track what you eat and drink  Also write down how many minutes of physical activity you do each day  Weigh yourself once a week and record it in your diary  Eating changes: You will need to eat 500 to 1,000 fewer calories each day than you currently eat to lose 1 to 2 pounds a week  The following changes will help you cut calories:  · Eat smaller portions  Use small plates, no larger than 9 inches in diameter  Fill your plate half full of fruits and vegetables  Measure your food using measuring cups until you know what a serving size looks like  · Eat 3 meals and 1 or 2 snacks each day  Plan your meals in advance  [place] and eat at home most of the time  Eat slowly  · Eat fruits and vegetables at every meal   They are low in calories and high in fiber, which makes you feel full  Do not add butter, margarine, or cream sauce to vegetables  Use herbs to season steamed vegetables  · Eat less fat and fewer fried foods  Eat more baked or grilled chicken and fish  These protein sources are lower in calories and fat than red meat  Limit fast food  Dress your salads with olive oil and vinegar instead of bottled dressing  · Limit the amount of sugar you eat  Do not drink sugary beverages  Limit alcohol  Activity changes:  Physical activity is good for your body in many ways   It helps you burn calories and build strong muscles  It decreases stress and depression, and improves your mood  It can also help you sleep better  Talk to your healthcare provider before you begin an exercise program   · Exercise for at least 30 minutes 5 days a week  Start slowly  Set aside time each day for physical activity that you enjoy and that is convenient for you  It is best to do both weight training and an activity that increases your heart rate, such as walking, bicycling, or swimming  · Find ways to be more active  Do yard work and housecleaning  Walk up the stairs instead of using elevators  Spend your leisure time going to events that require walking, such as outdoor festivals or fairs  This extra physical activity can help you lose weight and keep it off  Follow up with your healthcare provider as directed: You may need to meet with a dietitian  Write down your questions so you remember to ask them during your visits  © 2017 [address] Information is for End User's use only and may not be sold, redistributed or otherwise used for commercial purposes  All illustrations and images included in CareNotes® are the copyrighted property of [employer]  or [address]  The above information is an  only  It is not intended as medical advice for individual conditions or treatments  Talk to your doctor, nurse or pharmacist before following any medical regimen to see if it is safe and effective for you  10% - bad control"> 10% - bad control,Hemoglobin A1c (HbA1c) greater than 10% indicating poor diabetic control,Haemoglobin A1c greater than 10% indicating poor diabetic control">   Diabetes Mellitus Type 2 in Adults, Ambulatory Care   GENERAL INFORMATION:   Diabetes mellitus type 2  is a disease that affects how your body uses glucose (sugar)  Insulin helps move sugar out of the blood so it can be used for energy   Normally, when the blood sugar level increases, the pancreas makes more insulin  Type 2 diabetes develops because either the body cannot make enough insulin, or it cannot use the insulin correctly  After many years, your pancreas may stop making insulin  Common symptoms include the following:   · More hunger or thirst than usual     · Frequent urination     · Weight loss without trying     · Blurred vision  Seek immediate care for the following symptoms:   · Severe abdominal pain, or pain that spreads to your back  You may also be vomiting  · Trouble staying awake or focusing    · Shaking or sweating    · Blurred or double vision    · Breath has a fruity, sweet smell    · Breathing is deep and labored, or rapid and shallow    · Heartbeat is fast and weak  Treatment for diabetes mellitus type 2  includes keeping your blood sugar at a normal level  You must eat the right foods, and exercise regularly  You may also need medicine if you cannot control your blood sugar level with nutrition and exercise  Manage diabetes mellitus type 2:   · Check your blood sugar level  You will be taught how to check a small drop of blood in a glucose monitor  Ask your healthcare provider when and how often to check during the day  Ask your healthcare provider what your blood sugar levels should be when you check them  · Keep track of carbohydrates (sugar and starchy foods)  Your blood sugar level can get too high if you eat too many carbohydrates  Your dietitian will help you plan meals and snacks that have the right amount of carbohydrates  · Eat low-fat foods  Some examples are skinless chicken and low-fat milk  · Eat less sodium (salt)  Some examples of high-sodium foods to limit are soy sauce, potato chips, and soup  Do not add salt to food you cook  Limit your use of table salt  · Eat high-fiber foods  Foods that are a good source of fiber include vegetables, whole grain bread, and beans  · Limit alcohol    Alcohol affects your blood sugar level and can make it harder to manage your diabetes  Women should limit alcohol to 1 drink a day  Men should limit alcohol to 2 drinks a day  A drink of alcohol is 12 ounces of beer, 5 ounces of wine, or 1½ ounces of liquor  · Get regular exercise  Exercise can help keep your blood sugar level steady, decrease your risk of heart disease, and help you lose weight  Exercise for at least 30 minutes, 5 days a week  Include muscle strengthening activities 2 days each week  Work with your healthcare provider to create an exercise plan  · Check your feet each day  for injuries or open sores  Ask your healthcare provider for activities you can do if you have an open sore  · Quit smoking  If you smoke, it is never too late to quit  Smoking can worsen the problems that may occur with diabetes  Ask your healthcare provider for information about how to stop smoking if you are having trouble quitting  · Ask about your weight:  Ask healthcare providers if you need to lose weight, and how much to lose  Ask them to help you with a weight loss program  Even a 10 to 15 pound weight loss can help you manage your blood sugar level  · Carry medical alert identification  Wear medical alert jewelry or carry a card that says you have diabetes  Ask your healthcare provider where to get these items  · Ask about vaccines  Diabetes puts you at risk of serious illness if you get the flu, pneumonia, or hepatitis  Ask your healthcare provider if you should get a flu, pneumonia, or hepatitis B vaccine, and when to get the vaccine  Follow up with your healthcare provider as directed:  Write down your questions so you remember to ask them during your visits  CARE AGREEMENT:   You have the right to help plan your care  Learn about your health condition and how it may be treated  Discuss treatment options with your caregivers to decide what care you want to receive  You always have the right to refuse treatment   The above information is an  only  It is not intended as medical advice for individual conditions or treatments  Talk to your doctor, nurse or pharmacist before following any medical regimen to see if it is safe and effective for you  © 2014 [address] is for End User's use only and may not be sold, redistributed or otherwise used for commercial purposes  All illustrations and images included in CareNotes® are the copyrighted property of [employer]  or [employer]

## 2018-02-23 DIAGNOSIS — Z79.4 TYPE 2 DIABETES MELLITUS WITH COMPLICATION, WITH LONG-TERM CURRENT USE OF INSULIN (HCC): Primary | ICD-10-CM

## 2018-02-23 DIAGNOSIS — E11.8 TYPE 2 DIABETES MELLITUS WITH COMPLICATION, WITH LONG-TERM CURRENT USE OF INSULIN (HCC): Primary | ICD-10-CM

## 2018-02-23 NOTE — PROGRESS NOTES
Diagnoses and all orders for this visit:    Type 2 diabetes mellitus with complication, with long-term current use of insulin ([place] [address] )  Comments:  not at goal-cont insulin and oral med use as per med orders w freqent testing (mealtime and hs glucose) w prn adjustments  yrly eye exams,daily foot care  Orders:  -     Hemoglobin A1c; Future  -     Microalbumin / creatinine urine ratio  -     sitaGLIPtin (JANUVIA) 50 mg tablet; Take 1 tablet (50 mg total) by mouth daily    Gastroesophageal reflux disease without esophagitis  Comments:  dietary modifications, dec wgt  , follow-up GI prn  Hyperlipidemia associated with type 2 diabetes mellitus ([place] [address] )  Comments:  cont current meds, low col diet, cont efforts at weight reduction-lifestyle modif addressed  pt ed inform  given  Hypertension, essential  Comments:  BP borderline, cont current meds,  reduce sodium, limit caffeine, encourage wgt reduction, inc activity  Other orders  -     Hemoglobin A1c    Diagnoses and all orders for this visit:    Type 2 diabetes mellitus with complication, with long-term current use of insulin ([facility])  Comments:  not at goal-cont insulin and oral med use as per med orders w freqent testing (mealtime and hs glucose) w prn adjustments  yrly eye exams,daily foot care  Orders:  -     Hemoglobin A1c; Future  -     Microalbumin / creatinine urine ratio  -     sitaGLIPtin (JANUVIA) 50 mg tablet; Take 1 tablet (50 mg total) by mouth daily    Gastroesophageal reflux disease without esophagitis  Comments:  dietary modifications, dec wgt  , follow-up GI prn  Hyperlipidemia associated with type 2 diabetes mellitus ([place] [address] )  Comments:  cont current meds, low col diet, cont efforts at weight reduction-lifestyle modif addressed  pt ed inform  given  Hypertension, essential  Comments:  BP borderline, cont current meds,  reduce sodium, limit caffeine, encourage wgt reduction, inc activity      Other orders  -     Hemoglobin A1c    Assessment/Plan:           Diagnoses and all orders for this visit:    Type 2 diabetes mellitus with complication, with long-term current use of insulin ([facility])  Comments:  not at goal-cont insulin and oral med use as per med orders w freqent testing (mealtime and hs glucose) w prn adjustments  yrly eye exams,daily foot care  Orders:  -     Hemoglobin A1c; Future  -     Microalbumin / creatinine urine ratio  -     sitaGLIPtin (JANUVIA) 50 mg tablet; Take 1 tablet (50 mg total) by mouth daily    Gastroesophageal reflux disease without esophagitis  Comments:  dietary modifications, dec wgt  , follow-up GI prn  Hyperlipidemia associated with type 2 diabetes mellitus ([facility] [address] )  Comments:  cont current meds, low col diet, cont efforts at weight reduction-lifestyle modif addressed  pt ed inform  given  Hypertension, essential  Comments:  BP borderline, cont current meds,  reduce sodium, limit caffeine, encourage wgt reduction, inc activity  Other orders  -     Hemoglobin A1c            Subjective:      Patient ID: Keyana Herman is a 61 y o  male  Chief Complaint   Patient presents with    Diabetes     sugar has been high       62 yo diabetic pt in for follow-up  Req DexCom G5 Cont  Glucose monitoring system (CGM)  Blood sugars not yet at goal--having freq fluctuations though readings not as high as have been in recent past   Is on both oral meds and insulin-adjusting as needed    Tests mealtimes,bedtime and during day at other times if needed based on sxs he is feeling  Denies cp, abd pain at present --occ gerd, no melena  Cont to work on diet and activity  Eye care UTD  Aware to maintain good daily foot care  Vaccs UTD  Diabetes   Hypoglycemia symptoms include nervousness/anxiousness  Associated symptoms include fatigue  Pertinent negatives for diabetes include no chest pain         The following portions of the patient's history were reviewed and updated as appropriate: allergies, current medications, past family history, past medical history, past social history, past surgical history and problem list      Review of Systems   Constitutional: Positive for fatigue  Eyes:        Wears glasses   Respiratory: Negative  Cardiovascular: Negative for chest pain  Endocrine:        DM   Genitourinary: Negative  Neurological: Negative  Psychiatric/Behavioral: Positive for sleep disturbance  The patient is nervous/anxious  Objective:    /92 (BP Location: Left arm, Patient Position: Sitting, Cuff Size: Standard)   Pulse 78   Temp (!) 96 °F (35 6 °C)   Resp (!) 24   Ht 5' 11" (1 803 m)   Wt 132 kg (292 lb)   BMI 40 73 kg/m²        Physical Exam   Constitutional: He is oriented to person, place, and time  Overweight, chronically ill   HENT:   Head: Normocephalic and atraumatic  Mouth/Throat: No oropharyngeal exudate  Eyes: Conjunctivae and EOM are normal  Pupils are equal, round, and reactive to light  Neck: Normal range of motion  Neck supple  Cardiovascular: Normal rate and regular rhythm  Pulmonary/Chest: Effort normal and breath sounds normal    Abdominal: Soft  Bowel sounds are normal    Neurological: He is alert and oriented to person, place, and time  Skin: Skin is warm and dry  Psychiatric:   Anxious         Labs;  Labs in chart were reviewed        Tai Griffiths MD

## 2018-03-01 DIAGNOSIS — E11.42 DIABETIC POLYNEUROPATHY ASSOCIATED WITH TYPE 2 DIABETES MELLITUS (HCC): Primary | ICD-10-CM

## 2018-03-02 RX ORDER — PREGABALIN 50 MG/1
50 CAPSULE ORAL 3 TIMES DAILY
Qty: 90 CAPSULE | Refills: 0 | Status: SHIPPED | OUTPATIENT
Start: 2018-03-02 | End: 2019-06-21 | Stop reason: SDUPTHER

## 2018-03-07 NOTE — MISCELLANEOUS
History of Present Illness  COPD Subsequent Hospital Discharge Phone Calls: The patient is being contacted for continued follow-up after hospitalization  The patient was discharged from the hospital on 3/26/17  A message was left on [employer] requesting a return call  Current Meds    1  Oxycodone-Acetaminophen 5-325 MG Oral Tablet (Percocet); take 1 tablet every 6 hours   as needed for pain; Therapy: 52JHP3343 to (Evaluate:08Apr2017); Last Rx:03Apr2017 Ordered    2  Promethazine-Codeine 6 25-10 MG/5ML Oral Syrup; TAKE 5 ML EVERY 4 TO 6 HOURS   AS NEEDED FOR COUGH; Therapy: 25PKD7813 to (Evaluate:31Jan2017); Last Rx:23Jan2017 Ordered    3  Celecoxib 200 MG Oral Capsule (CeleBREX); TAKE ONE CAPSULE BY MOUTH ONCE   DAILY; Therapy: 82XDI6600 to (Last Rx:03Apr2017)  Requested for: 03Apr2017 Ordered    4  AmLODIPine Besylate 5 MG Oral Tablet; Take one tablet two times per day  Requested   for: 82OOP6327; Last Rx:06Jan2017 Ordered   5  Aspirin 81 MG CAPS; take 1 capsule daily; Therapy: (SDBFPGHZ:08CCJ9335) to Recorded   6  Lisinopril 20 MG Oral Tablet; TAKE ONE TABLET BY MOUTH ONCE DAILY  Requested   for: 12Apr2017; Last Rx:08Apr2017 Ordered   7  Metoprolol Tartrate 25 MG Oral Tablet; take 1 tablet bid  Requested for: 13OLF5112; Last   Rx:04Apr2017 Ordered    8  Advair Diskus 250-50 MCG/DOSE Inhalation Aerosol Powder Breath Activated; INHALE 1   PUFF TWICE DAILY; Therapy: 77LTT5741 to (Last Rx:08Jun2016)  Requested for: 23VCP6437 Ordered   9  Breo Ellipta 200-25 MCG/INH Inhalation Aerosol Powder Breath Activated; INHALE 1   PUFFS Daily; Therapy: 73FKY8592 to (Evaluate:02Oct2017); Last Rx:05Apr2017 Ordered   10  ProAir  (90 Base) MCG/ACT Inhalation Aerosol Solution; INHALE 2 PUFFS FOUR    TIMES DAILY AS DIRECTED; Therapy: 11HZX9911 to (Last Rx:08Jun2016)  Requested for: 12Apr2017 Ordered   11  Spiriva HandiHaler 18 MCG Inhalation Capsule; 1 capsule inhaled daily;     Therapy: 68ZGJ0281 to (Evaluate:02Oct2017)  Requested for: 34Vux6011; Last    Rx:43Rgr7756 Ordered    12  Dexilant 60 MG Oral Capsule Delayed Release; 1 EVERY MORNING; Therapy: 06BJM4585 to (Evaluate:07Mtu8428)  Requested for: 02OMQ7450; Last    Rx:19Jan2017 Ordered    13  MetFORMIN HCl - 1000 MG Oral Tablet; TAKE 1 TABLET EVERY 12 HOURS; Therapy: 05ZTC9706 to (Evaluate:93Dfg9964)  Requested for: 66Fqe9254; Last    Rx:98Byr0270 Ordered   14  NovoLOG FlexPen 100 UNIT/ML Subcutaneous Solution Pen-injector; 14 units    subcutaneous with meals; 16 units with evening meal;    Therapy: 77GZR5106 to (Last Rx:57Ngy0813)  Requested for: 12Apr2017 Ordered    15  Lyrica 50 MG Oral Capsule; TAKE 1 CAPSULE 3 TIMES DAILY; Therapy: 47Fbu9989 to (Evaluate:97Qxy7909)  Requested for: 48Pct8820; Last    Rx:81Lkr5788 Ordered    16  Fenofibrate 160 MG Oral Tablet; 1 every day; Therapy: 73STB6778 to (Last Cintia Salle)  Requested for: 66CJK4032 Ordered   17  Lovastatin 40 MG Oral Tablet; 1 Every Day At Bedtime; Therapy: 90ZED6472 to (Last Rx:42Mcq5751)  Requested for: 51Qmo9077 Ordered    18  LamoTRIgine 100 MG Oral Tablet; TAKE 1 TABLET DAILY; Therapy: 99GVE1310 to (Evaluate:61Qwu8215)  Requested for: 62MRL2129; Last    Rx:58Qig5140 Ordered    19  Triamcinolone Acetonide 0 1 % External Cream; apply BID; Therapy: 77TEP5914 to (Last Rx:07Reo0340)  Requested for: 19Jnm1598 Ordered    20  BD Pen Needle Mini U/F 31G X 5 MM Miscellaneous; USE 4 TIMES A DAY; Therapy: 89ZBL0075 to (Last Limmie Dunn)  Requested for: 28Jun2016 Ordered   21  Lantus SoloStar 100 UNIT/ML Subcutaneous Solution Pen-injector; 30 units in evening    MDD:15 TDD:15;    Therapy: 09Ceq1081 to (Last Rx:26Foe7552)  Requested for: 12Apr2017 Ordered    22  Aram Contour Test In [employer]; TEST 3 TIMES DAILY; Therapy: 93GXM7672 to (Evaluate:31Mje0413)  Requested for: 76WXL1460; Last    Rx:25Mar2015 Ordered    23   Vitamin D (Ergocalciferol) 59852 UNIT Oral Capsule; TAKE 1 CAPSULE WEEKLY; Therapy: 49WAZ3074 to (Last Rx:08Apr2017)  Requested for: 08Apr2017 Ordered    24  Cyclobenzaprine HCl - 10 MG Oral Tablet; Therapy: (Richard Given) to Recorded   25  Effexor  MG Oral Capsule Extended Release 24 Hour (Venlafaxine HCl ER); TAKE    1 CAPSULE ONCE DAILY WITH FOOD; Therapy: (Kaila Alejandre) to Recorded   26  GaviLyte-N with Flavor Pack 420 GM Oral Solution Reconstituted; Therapy: (Richard Given) to Recorded   27  LaMICtal  MG Oral Tablet Dispersible (LamoTRIgine); Therapy: (Recorded:77Isf3866) to Recorded   28  Phentermine HCl - 37 5 MG Oral Capsule; take 1 capsule daily; Therapy: (Richard Given) to Recorded   29  QUEtiapine Fumarate 50 MG Oral Tablet; Take 1 tablet twice daily; Therapy: (Recorded:70Xns2572) to Recorded   30  SEROquel 100 MG Oral Tablet (QUEtiapine Fumarate); TAKE 1 TABLET AT BEDTIME; Therapy: (Recorded:76Xzk0980) to Recorded   31  SEROquel 300 MG Oral Tablet (QUEtiapine Fumarate); TAKE 2 TABLETS AT BEDTIME; Therapy: (Recorded:95Npp9964) to Recorded   32  Venlafaxine HCl  MG Oral Capsule Extended Release 24 Hour; TAKE 1 CAPSULE     EVERY MORNING; Therapy: (Richard Given) to Recorded   33  Venlafaxine HCl ER 75 MG Oral Tablet Extended Release 24 Hour; Take 1 tablet daily; Therapy: (Recorded:26Nrt7507) to Recorded   34  Vraylar 3 MG Oral Capsule; TAKE 1 CAPSULE Bedtime; Therapy: (Richard Given) to Recorded   35  Zolpidem Tartrate 10 MG Oral Tablet; 1 Every Day At Bedtime, As Needed; Therapy: (Recorded:30Ygs8908) to Recorded    Allergies    1  Wellbutrin TABS    Future Appointments    Date/Time Provider Specialty Site   05/10/2017 10:00 AM HIREN Serrano  [address] [place] [address]   07/06/2017 09:30 AM RATNA Shay Pulmonary Medicine [facility] [place] [place] [facility] Triston Buck     Signatures   Electronically signed by : Carleen Garcia RT;  Apr 21 2017 3:36PM EST                       (Author)

## 2018-03-07 NOTE — MISCELLANEOUS
History of Present Illness  COPD Subsequent Hospital Discharge Phone Calls: The patient is being contacted for continued follow-up after hospitalization  The patient was discharged from the hospital on 11/6/2017  I spoke with Rafael Murguia   The patient was discharged to home  The patient was seen by his PCP on 11/20/2017  The patient was seen by his Pulmonologist on 11/15/2017  The patient is a former smoker with a 3-4 PPD pack year history  The patient quit smoking in 2001  mMRC Scale Rating:   II -- Walks slower than people of the same age on level ground because of SOB and/or have to stop for breath when walking at my own pace  Counseling and topics reviewed:  DME (Oxygen, Nebulizer, Flutter valve, CPAP/BiPap)  and COPD Care Number given  Narrative summary:  Rafael Murguia is feeling better overall, however notes does still have some intermittent SOB/dyspnea  Since pulm visit, has begun wearing CPAP with daytime naps as instructed for increased compliance rates  We reviewed basic infection prevention initiatives and he is utd on flu and pna vaccinations  HE does not smoke for past 16+ years  Doing his [place] shopping online to avoid crowds this year  Did remind him to utilize our phone line with any questions pertinent to his lung health, melyssa with DME, etc  Verbalized understanding  Current Meds    1  AmLODIPine Besylate 5 MG Oral Tablet; Take one tablet two times per day  Requested   for: 28Nov2017; Last Rx:25Sep2017 Ordered   2  Aspirin 81 MG CAPS; take 1 capsule daily; Therapy: (Recorded:28Nov2017) to Recorded   3  Lisinopril 20 MG Oral Tablet; take 1 tablet by mouth once daily; Therapy: 57DHO2819 to (Marianna Lugo)  Requested for: 05UEG4768; Last   Rx:25Sep2017 Ordered   4  Metoprolol Tartrate 25 MG Oral Tablet; Take one tablet twice daily; Therapy: 63RBE1181 to (Evaluate:18Nov2017)  Requested for: 77NAL0913; Last   Rx:10Its6630 Ordered    5   AirDuo RespiClick 916/85 054-51 MCG/ACT Inhalation Aerosol Powder Breath Activated;   1 puff every 12 hours; Therapy: 42Iyz0841 to (Evaluate:62Xxa3146)  Requested for: 92TXE4393; Last   Rx:34Pku2879 Ordered    6  Benzonatate 100 MG Oral Capsule; TAKE 1 CAPSULE 3 TIMES DAILY AS NEEDED; Therapy: 59DQL3608 to (Evaluate:69Jcp3220)  Requested for: 96YUB2160; Last   Rx:34Lii8526 Ordered    7  MetFORMIN HCl - 500 MG Oral Tablet; 1 Every Day At Bedtime; Therapy: 92Idu2991 to (Last Rx:47Ert2250)  Requested for: 16Caz6510 Ordered    8  Lyrica 50 MG Oral Capsule; TAKE 1 CAPSULE 3 TIMES DAILY; Therapy: 06Svo5386 to (Evaluate:92Awq3563)  Requested for: 25OPR1849; Last   Rx:74Vih4034 Ordered    9  Omeprazole 40 MG Oral Capsule Delayed Release; one cap po qd; Therapy: 00TBT5553 to (Last Samanta Sea)  Requested for: 26PTX7904 Ordered    10  Fenofibrate 160 MG Oral Tablet; 1 every day; Therapy: 59JKD3653 to (Last Samanta Sea)  Requested for: 71VJX6525 Ordered   11  Lovastatin 40 MG Oral Tablet; One tab po qd; Therapy: 21EBN6773 to (Last Samanta Sea)  Requested for: 22ZVQ9788 Ordered    12  AirDuo RespiClick 581/85 689-06 MCG/ACT Inhalation Aerosol Powder Breath Activated;    1 puff every 12 hours; Therapy: 99KCN7313 to (Evaluate:77Gzw0505)  Requested for: 91SYD5358; Last    Rx:42Cxu7481 Ordered   13  ProAir  (90 Base) MCG/ACT Inhalation Aerosol Solution; 2 PUFFS EVERY 6    HOURS; Therapy: 54PYA5029 to (Evaluate:31Ogn8032)  Requested for: 62YSM3900; Last    Rx:62Ynl7674 Ordered    14  BD Pen Needle Mini U/F 31G X 5 MM Miscellaneous; USE 4 TIMES A DAY; Therapy: 46XXE2780 to (Last Rx:52Hlk4322)  Requested for: 11Gvb4183 Ordered    15  Aram Contour Test In [employer]; TEST 3 TIMES DAILY; Therapy: 23DZL7829 to (Evaluate:56Oog6103)  Requested for: 97MNJ0947; Last    Rx:25Mar2015 Ordered   16   Lantus SoloStar 100 UNIT/ML Subcutaneous Solution Pen-injector; INJECT  50 units SQ     at hs;    Therapy: 39OUU1626 to (Last Rx:20Nov2017) Requested for: 20Nov2017 Ordered   17  Victoza 18 MG/3ML Subcutaneous Solution Pen-injector; GIVE FULL DOSE 1 8 MG    SUBCUTANEOUSLY DAILY; Therapy: 42EWG9884 to (Last Rx:20Nov2017)  Requested for: 20Nov2017 Ordered    18  Vitamin D (Ergocalciferol) 94794 UNIT Oral Capsule; TAKE 1 CAPSULE BY MOUTH    EVERY WEEK; Therapy: 06XLH6738 to 21 )  Requested for: 19VVA2393; Last    Rx:63Lht0261 Ordered    19  Effexor  MG Oral Capsule Extended Release 24 Hour (Venlafaxine HCl ER); TAKE    1 CAPSULE ONCE DAILY WITH FOOD; Therapy: (Recorded:28Nov2017) to Recorded   20  Mirtazapine 45 MG Oral Tablet; TAKE 2 TABLETS AT BEDTIME; Therapy: (Recorded:15Nov2017) to Recorded   21  NovoLOG FlexPen 100 UNIT/ML Subcutaneous Solution Pen-injector; INJECT SUBQ 14    UNITS WITH BREAKFAST, 14 UNITS WITH LUNCH AND 20 UNITS WITH    DINNER; Therapy: (Recorded:28Nov2017) to Recorded   22  PredniSONE 20 MG Oral Tablet; TAKE 4 TABLETS FOR 3 DAYS, 3 TABLETS FOR 3    DAYS,2 TABLETS FOR 3 DAYS THEN 1 TABLET FOR 3 DAYS; Therapy: (Recorded:15Nov2017) to Recorded   23  QUEtiapine Fumarate 50 MG Oral Tablet; Take 1 tablet twice daily; Therapy: (Recorded:28Nov2017) to Recorded   24  SEROquel 400 MG Oral Tablet (QUEtiapine Fumarate); TAKE 2 TABLETS AT BEDTIME; Therapy: (Recorded:28Nov2017) to Recorded   25  Venlafaxine HCl  MG Oral Capsule Extended Release 24 Hour; TAKE 1 CAPSULE     EVERY MORNING; Therapy: (Johan Vega) to Recorded   26  Venlafaxine HCl ER 75 MG Oral Tablet Extended Release 24 Hour; Take 1 tablet daily; Therapy: (Recorded:71Ewh5139) to Recorded   27  Vraylar 4 5 MG Oral Capsule; TAKE 1 TAB AT BEDTIME; Therapy: (Recorded:71Icv0590) to Recorded   28  Wellbutrin  MG Oral Tablet Extended Release 24 Hour (BuPROPion HCl ER (XL));    TAKE 1 TABLET  1 tab dailt for a week, 2 tablets daily after that; Therapy: (Recorded:28Nov2017) to Recorded   29   Xanax 2 MG Oral Tablet (ALPRAZolam); TAKE 1 TABLET AT BEDTIME; Therapy: (Recorded:28Nov2017) to Recorded    Allergies    1  Wellbutrin TABS    Future Appointments    Date/Time Provider Specialty Site   02/26/2018 10:30 AM APOLINAR Trejo   Pulmonary Medicine [facility]     Signatures   Electronically signed by : Deidra Wade, ; Dec  7 2017  4:58PM EST                       (Author)

## 2018-03-14 ENCOUNTER — TELEPHONE (OUTPATIENT)
Dept: FAMILY MEDICINE CLINIC | Facility: CLINIC | Age: 58
End: 2018-03-14

## 2018-03-14 NOTE — TELEPHONE ENCOUNTER
LM with Jamel Nash requesting call back to obtain fax # - documentation at nurses station to be faxed

## 2018-03-14 NOTE — TELEPHONE ENCOUNTER
Please call Roxy Skiff from [address] [place] [address]#[phone]  And find out fax number to resend last chart note on pt   that he is requesting  Thanks

## 2018-03-15 NOTE — TELEPHONE ENCOUNTER
Dr Justo Lazar:  Chang Ritter from [facility] called and asked if you could change the wording on Rx from test meal times/bed times to "Patient is testing 4 or more times per day" in order for Medicare cover this Rx  In reference to injection it has to say "Patient injects 3 or more times per day or is injecting 3 or more times per day"      Mayte's contact # is [phone]

## 2018-03-16 DIAGNOSIS — E11.8 TYPE 2 DIABETES MELLITUS WITH COMPLICATION, WITH LONG-TERM CURRENT USE OF INSULIN (HCC): Primary | ICD-10-CM

## 2018-03-16 DIAGNOSIS — Z79.4 TYPE 2 DIABETES MELLITUS WITH COMPLICATION, WITH LONG-TERM CURRENT USE OF INSULIN (HCC): Primary | ICD-10-CM

## 2018-03-17 NOTE — TELEPHONE ENCOUNTER
Please call Lita Sauceda to see if clarification of info needed can be written on rx separately and be faxed to her or does she need to send us a form for completion-thanks

## 2018-03-19 NOTE — TELEPHONE ENCOUNTER
Please ad addendum(specific ,word as task before states) to last office visit and fax to [phone]  tc/cma

## 2018-03-20 ENCOUNTER — TELEPHONE (OUTPATIENT)
Dept: FAMILY MEDICINE CLINIC | Facility: CLINIC | Age: 58
End: 2018-03-20

## 2018-03-20 DIAGNOSIS — Z79.4 TYPE 2 DIABETES MELLITUS WITH COMPLICATION, WITH LONG-TERM CURRENT USE OF INSULIN (HCC): ICD-10-CM

## 2018-03-20 DIAGNOSIS — E11.8 TYPE 2 DIABETES MELLITUS WITH COMPLICATION, WITH LONG-TERM CURRENT USE OF INSULIN (HCC): ICD-10-CM

## 2018-03-20 NOTE — TELEPHONE ENCOUNTER
Dr Shy De Leon,     Patient said that you recommended him to see a specialist for his diabetes and he didn't know where to start to find one  Please call him and let him know of your recommendation  Thank you

## 2018-03-23 ENCOUNTER — TELEPHONE (OUTPATIENT)
Dept: FAMILY MEDICINE CLINIC | Facility: CLINIC | Age: 58
End: 2018-03-23

## 2018-03-28 NOTE — TELEPHONE ENCOUNTER
Received call from Mook at [facility], addendum needs to be in ov note    Please fax to [phone]

## 2018-03-28 NOTE — TELEPHONE ENCOUNTER
Tried to but cannot -will need to do at next follow-up appt or pt can check if pt was able to make appt w endocrinologist-if so he can ask to have written thru them at that visit-thanks

## 2018-03-29 ENCOUNTER — TELEPHONE (OUTPATIENT)
Dept: FAMILY MEDICINE CLINIC | Facility: CLINIC | Age: 58
End: 2018-03-29

## 2018-03-29 NOTE — TELEPHONE ENCOUNTER
Chang Ritter from [facility] called and stated that she need the Rx sent over in a form of original chart note and addendum to the last date of visit

## 2018-03-30 ENCOUNTER — OFFICE VISIT (OUTPATIENT)
Dept: FAMILY MEDICINE CLINIC | Facility: CLINIC | Age: 58
End: 2018-03-30
Payer: MEDICARE

## 2018-03-30 VITALS
TEMPERATURE: 97 F | SYSTOLIC BLOOD PRESSURE: 150 MMHG | RESPIRATION RATE: 22 BRPM | HEART RATE: 88 BPM | DIASTOLIC BLOOD PRESSURE: 72 MMHG | BODY MASS INDEX: 41.69 KG/M2 | HEIGHT: 71 IN | WEIGHT: 297.8 LBS

## 2018-03-30 DIAGNOSIS — Z79.4 TYPE 2 DIABETES MELLITUS WITH HYPERGLYCEMIA, WITH LONG-TERM CURRENT USE OF INSULIN (HCC): Primary | ICD-10-CM

## 2018-03-30 DIAGNOSIS — I10 BENIGN ESSENTIAL HYPERTENSION: ICD-10-CM

## 2018-03-30 DIAGNOSIS — E11.65 TYPE 2 DIABETES MELLITUS WITH HYPERGLYCEMIA, WITH LONG-TERM CURRENT USE OF INSULIN (HCC): Primary | ICD-10-CM

## 2018-03-30 PROBLEM — E87.1 HYPONATREMIA: Status: ACTIVE | Noted: 2017-04-12

## 2018-03-30 PROCEDURE — 99213 OFFICE O/P EST LOW 20 MIN: CPT | Performed by: FAMILY MEDICINE

## 2018-03-30 NOTE — PATIENT INSTRUCTIONS
10% - bad control"> 10% - bad control,Hemoglobin A1c (HbA1c) greater than 10% indicating poor diabetic control,Haemoglobin A1c greater than 10% indicating poor diabetic control">   Diabetes Mellitus Type 2 in Adults, Ambulatory Care   GENERAL INFORMATION:   Diabetes mellitus type 2  is a disease that affects how your body uses glucose (sugar)  Insulin helps move sugar out of the blood so it can be used for energy  Normally, when the blood sugar level increases, the pancreas makes more insulin  Type 2 diabetes develops because either the body cannot make enough insulin, or it cannot use the insulin correctly  After many years, your pancreas may stop making insulin  Common symptoms include the following:   · More hunger or thirst than usual     · Frequent urination     · Weight loss without trying     · Blurred vision  Seek immediate care for the following symptoms:   · Severe abdominal pain, or pain that spreads to your back  You may also be vomiting  · Trouble staying awake or focusing    · Shaking or sweating    · Blurred or double vision    · Breath has a fruity, sweet smell    · Breathing is deep and labored, or rapid and shallow    · Heartbeat is fast and weak  Treatment for diabetes mellitus type 2  includes keeping your blood sugar at a normal level  You must eat the right foods, and exercise regularly  You may also need medicine if you cannot control your blood sugar level with nutrition and exercise  Manage diabetes mellitus type 2:   · Check your blood sugar level  You will be taught how to check a small drop of blood in a glucose monitor  Ask your healthcare provider when and how often to check during the day  Ask your healthcare provider what your blood sugar levels should be when you check them  · Keep track of carbohydrates (sugar and starchy foods)  Your blood sugar level can get too high if you eat too many carbohydrates   Your dietitian will help you plan meals and snacks that have the right amount of carbohydrates  · Eat low-fat foods  Some examples are skinless chicken and low-fat milk  · Eat less sodium (salt)  Some examples of high-sodium foods to limit are soy sauce, potato chips, and soup  Do not add salt to food you cook  Limit your use of table salt  · Eat high-fiber foods  Foods that are a good source of fiber include vegetables, whole grain bread, and beans  · Limit alcohol  Alcohol affects your blood sugar level and can make it harder to manage your diabetes  Women should limit alcohol to 1 drink a day  Men should limit alcohol to 2 drinks a day  A drink of alcohol is 12 ounces of beer, 5 ounces of wine, or 1½ ounces of liquor  · Get regular exercise  Exercise can help keep your blood sugar level steady, decrease your risk of heart disease, and help you lose weight  Exercise for at least 30 minutes, 5 days a week  Include muscle strengthening activities 2 days each week  Work with your healthcare provider to create an exercise plan  · Check your feet each day  for injuries or open sores  Ask your healthcare provider for activities you can do if you have an open sore  · Quit smoking  If you smoke, it is never too late to quit  Smoking can worsen the problems that may occur with diabetes  Ask your healthcare provider for information about how to stop smoking if you are having trouble quitting  · Ask about your weight:  Ask healthcare providers if you need to lose weight, and how much to lose  Ask them to help you with a weight loss program  Even a 10 to 15 pound weight loss can help you manage your blood sugar level  · Carry medical alert identification  Wear medical alert jewelry or carry a card that says you have diabetes  Ask your healthcare provider where to get these items  · Ask about vaccines  Diabetes puts you at risk of serious illness if you get the flu, pneumonia, or hepatitis   Ask your healthcare provider if you should get a flu, pneumonia, or hepatitis B vaccine, and when to get the vaccine  Follow up with your healthcare provider as directed:  Write down your questions so you remember to ask them during your visits  CARE AGREEMENT:   You have the right to help plan your care  Learn about your health condition and how it may be treated  Discuss treatment options with your caregivers to decide what care you want to receive  You always have the right to refuse treatment  The above information is an  only  It is not intended as medical advice for individual conditions or treatments  Talk to your doctor, nurse or pharmacist before following any medical regimen to see if it is safe and effective for you  © 2014 [address] is for End User's use only and may not be sold, redistributed or otherwise used for commercial purposes  All illustrations and images included in CareNotes® are the copyrighted property of [employer]  or [employer]  Calorie Counting Diet   WHAT YOU NEED TO KNOW:   What is a calorie counting diet? It is a meal plan based on counting calories each day to reach a healthy body weight  You will need to eat fewer calories if you are trying to lose weight  Weight loss may decrease your risk for certain health problems or improve your health if you have health problems  Some of these health problems include heart disease, high blood pressure, and diabetes  What foods should I avoid? Your dietitian will tell you if you need to avoid certain foods based on your body weight and health condition  You may need to avoid high-fat foods if you are at risk for or have heart disease  You may need to eat fewer foods from the breads and starches food group if you have diabetes  How many calories are in foods? The following is a list of foods and drinks with the approximate number of calories in each  Check the food label to find the exact number of calories   A dietitian can tell you how many calories you should have from each food group each day    · Carbohydrate:      ¨ ½ of a 3-inch bagel, 1 slice of bread, or ½ of a hamburger bun or hot dog bun (80)    ¨ 1 (8-inch) flour tortilla or ½ cup of cooked rice (100)    ¨ 1 (6-inch) corn tortilla (80)    ¨ 1 (6-inch) pancake or 1 cup of bran flakes cereal (110)    ¨ ½ cup of cooked cereal (80)    ¨ ½ cup of cooked pasta (85)    ¨ 1 ounce of pretzels (100)    ¨ 3 cups of air-popped popcorn without butter or oil (80)    · Dairy:      ¨ 1 cup of skim or 1% milk (90)    ¨ 1 cup of 2% milk (120)    ¨ 1 cup of whole milk (160)    ¨ 1 cup of 2% chocolate milk (220)    ¨ 1 ounce of low-fat cheese with 3 grams of fat per ounce (70)    ¨ 1 ounce of cheddar cheese (114)    ¨ ½ cup of 1% fat cottage cheese (80)    ¨ 1 cup of plain or sugar-free, fat-free yogurt (90)    · Protein foods:      ¨ 3 ounces of fish (not breaded or fried) (95)    ¨ 3 ounces of breaded, fried fish (195)    ¨ ¾ cup of tuna canned in water (105)    ¨ 3 ounces of chicken breast without skin (105)    ¨ 1 fried chicken breast with skin (350)    ¨ ¼ cup of fat free egg substitute (40)    ¨ 1 large egg (75)    ¨ 3 ounces of lean beef or pork (165)    ¨ 3 ounces of fried pork chop or ham (185)    ¨ ½ cup of cooked dried beans, such as kidney, bragg, lentils, or navy (115)    ¨ 3 ounces of bologna or lunch meat (225)    ¨ 2 links of breakfast sausage (140)    · Vegetables:      ¨ ½ cup of sliced mushrooms (10)    ¨ 1 cup of salad greens, such as lettuce, spinach, or tran (15)    ¨ ½ cup of steamed asparagus (20)    ¨ ½ cup of cooked summer squash, zucchini squash, or green or wax beans (25)    ¨ 1 cup of broccoli or cauliflower florets, or 1 medium tomato (25)    ¨ 1 large raw carrot or ½ cup of cooked carrots (40)    ¨ ? of a medium cucumber or 1 stalk of celery (5)    ¨ 1 small baked potato (160)    ¨ 1 cup of breaded, fried vegetables (230)    · Fruit:      ¨ 1 (6-inch) banana (55)     ¨ ½ of a 4-inch grapefruit (55)    ¨ 15 grapes (60)    ¨ 1 medium orange or apple (70)    ¨ 1 large peach (65)    ¨ 1 cup of fresh pineapple chunks (75)    ¨ 1 cup of melon cubes (50)    ¨ 1¼ cups of whole strawberries (45)    ¨ ½ cup of fruit canned in juice (55)    ¨ ½ cup of fruit canned in heavy syrup (110)    ¨ ?  cup of raisins (130)    ¨ ½ cup of unsweetened fruit juice (60)    ¨ ½ cup of grape, cranberry, or prune juice (90)    · Fat:      ¨ 10 peanuts or 2 teaspoons of peanut butter (55)    ¨ 2 tablespoons of avocado or 1 tablespoon of regular salad dressing (45)    ¨ 2 slices of mckeon (90)    ¨ 1 teaspoon of oil, such as safflower, canola, corn, or olive oil (45)    ¨ 2 teaspoons of low-fat margarine, or 1 tablespoon of low-fat mayonnaise (50)    ¨ 1 teaspoon of regular margarine (40)    ¨ 1 tablespoon of regular mayonnaise (135)    ¨ 1 tablespoon of cream cheese or 2 tablespoons of low-fat cream cheese (45)    ¨ 2 tablespoons of vegetable shortening (215)    · Dessert and sweets:      ¨ 8 animal crackers or 5 vanilla wafers (80)    ¨ 1 frozen fruit juice bar (80)    ¨ ½ cup of ice milk or low-fat frozen yogurt (90)    ¨ ½ cup of sherbet or sorbet (125)    ¨ ½ cup of sugar-free pudding or custard (60)    ¨ ½ cup of ice cream (140)    ¨ ½ cup of pudding or custard (175)    ¨ 1 (2-inch) square chocolate brownie (185)    · Combination foods:      ¨ Bean burrito made with an 8-inch tortilla, without cheese (275)    ¨ Chicken breast sandwich with lettuce and tomato (325)    ¨ 1 cup of chicken noodle soup (60)    ¨ 1 beef taco (175)    ¨ Regular hamburger with lettuce and tomato (310)    ¨ Regular cheeseburger with lettuce and tomato (410)     ¨ ¼ of a 12-inch cheese pizza (280)    ¨ Fried fish sandwich with lettuce and tomato (425)    ¨ Hot dog and bun (275)    ¨ 1½ cups of macaroni and cheese (310)    ¨ Taco salad with a fried tortilla shell (870)    · Low-calorie foods:      ¨ 1 tablespoon of ketchup or 1 tablespoon of fat free sour cream (15)    ¨ 1 teaspoon of mustard (5)    ¨ ¼ cup of salsa (20)    ¨ 1 large dill pickle (15)    ¨ 1 tablespoon of fat free salad dressing (10)    ¨ 2 teaspoons of low-sugar, light jam or jelly, or 1 tablespoon of sugar-free syrup (15)    ¨ 1 sugar-free popsicle (15)    ¨ 1 cup of club soda, seltzer water, or diet soda (0)  CARE AGREEMENT:   You have the right to help plan your care  Discuss treatment options with your caregivers to decide what care you want to receive  You always have the right to refuse treatment  The above information is an  only  It is not intended as medical advice for individual conditions or treatments  Talk to your doctor, nurse or pharmacist before following any medical regimen to see if it is safe and effective for you  © 2017 [address] Information is for End User's use only and may not be sold, redistributed or otherwise used for commercial purposes  All illustrations and images included in CareNotes® are the copyrighted property of [employer] M [employer] Inc  or [employer]

## 2018-04-01 NOTE — PROGRESS NOTES
Assessment/Plan:       Diagnoses and all orders for this visit:    Type 2 diabetes mellitus with hyperglycemia, with long-term current use of insulin ([facility] [address] )  Comments:  cont current mgt  await further endocrine rec  Pt currently testing 4 or more times per day and injecting insulin 3 or more times per day to cover readings  Benign essential hypertension  Comments:  cont current meds,maintain low sodium diet, limt caffeine, inc activity as tolerated, cont efforst at weight reduction  Subjective:      Patient ID: Olinda Larose is a 61 y o  male  Chief Complaint   Patient presents with    Blood Sugar Problem     blood sugar running 300's in morning then drops to200's       60 yo pt in for follow-up  Blood sugars cont to run high -- though better w restart of metformin and insulin adjustment  Has appt w endocrinologist next week--needs labs forwarded  Sys BP borderline hi--denies h/a, cp, dizziness  ongoing fatigue--psychiatrist making some med adjustments to try to improve        The following portions of the patient's history were reviewed and updated as appropriate: allergies, current medications, past family history, past medical history, past social history, past surgical history and problem list      Review of Systems   Constitutional: Positive for fatigue  Negative for fever  HENT: Negative  Respiratory: Negative  Cardiovascular: Negative  Genitourinary: Negative for dysuria  Neurological: Positive for weakness  Psychiatric/Behavioral: Positive for sleep disturbance  The patient is nervous/anxious  Objective:    /72 (BP Location: Left arm, Patient Position: Sitting, Cuff Size: Standard)   Pulse 88   Temp (!) 97 °F (36 1 °C)   Resp 22   Ht 5' 11" (1 803 m)   Wt 135 kg (297 lb 12 8 oz)   BMI 41 53 kg/m²        Physical Exam   Constitutional: He is oriented to person, place, and time  No distress     Obese, chronically ill   Eyes: Conjunctivae are normal    Neck: Neck supple  Cardiovascular: Normal rate and regular rhythm  Pulmonary/Chest: Effort normal and breath sounds normal    Abdominal: Bowel sounds are normal  There is no tenderness  Neurological: He is alert and oriented to person, place, and time  No cranial nerve deficit  Skin: Skin is warm and dry  Psychiatric: He has a normal mood and affect  Nursing note and vitals reviewed  Labs;  Labs in chart were reviewed        Ronit Lyons MD

## 2018-04-04 ENCOUNTER — OFFICE VISIT (OUTPATIENT)
Dept: PULMONOLOGY | Facility: MEDICAL CENTER | Age: 58
End: 2018-04-04
Payer: MEDICARE

## 2018-04-04 VITALS
SYSTOLIC BLOOD PRESSURE: 138 MMHG | HEART RATE: 107 BPM | TEMPERATURE: 98 F | WEIGHT: 299 LBS | OXYGEN SATURATION: 95 % | DIASTOLIC BLOOD PRESSURE: 84 MMHG | BODY MASS INDEX: 40.5 KG/M2 | RESPIRATION RATE: 12 BRPM | HEIGHT: 72 IN

## 2018-04-04 DIAGNOSIS — R06.00 DYSPNEA ON EXERTION: ICD-10-CM

## 2018-04-04 DIAGNOSIS — J44.9 CHRONIC OBSTRUCTIVE PULMONARY DISEASE, UNSPECIFIED COPD TYPE (HCC): Primary | ICD-10-CM

## 2018-04-04 DIAGNOSIS — G47.33 MILD OBSTRUCTIVE SLEEP APNEA: ICD-10-CM

## 2018-04-04 PROBLEM — R06.09 DYSPNEA ON EXERTION: Status: ACTIVE | Noted: 2018-04-04

## 2018-04-04 PROCEDURE — 99214 OFFICE O/P EST MOD 30 MIN: CPT | Performed by: NURSE PRACTITIONER

## 2018-04-04 PROCEDURE — 94010 BREATHING CAPACITY TEST: CPT | Performed by: NURSE PRACTITIONER

## 2018-04-04 NOTE — ASSESSMENT & PLAN NOTE
Delmis Steven is here today for compliance visit  He has mild obstructive sleep apnea and uses CPAP since 10 cm water pressure with 2 L of supplemental oxygen  According to patient he is using this every night and is benefitting  His daytime hypersomnolence decreased  Plan includes review of compliance data  At this point, patient is current encouraged to continue usage of his CPAP 10 cm of water pressure with 2 L of supplemental oxygen  He is using an benefitting with this treatment  I did receive compliance data from 3/5-43/18  Alonzo is 100% compliant, average usage is 6 hours 7 minutes  AHI is reduced to 1 3 events per hour

## 2018-04-04 NOTE — ASSESSMENT & PLAN NOTE
Alonzo has chronic exertional dyspnea  This is likely multifactorial   He is obese and does have moderate to severe chronic bronchitis/COPD  I did walk patient today and room air oxygen was 95%, walking up and down steps and 60 feet it went to 92%  He reports having a cardiac catheterization in the last year  This was done in [place]  I am awaiting results  Likely he has diastolic dysfunction which is contributing to his shortness of breath  Plan includes, continuation of his inhaled corticosteroid as well as DuoNeb at least twice daily via nebulizer
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
## 2018-04-04 NOTE — ASSESSMENT & PLAN NOTE
Juan Yap has both severe restrictive and obstructive defect seen on PFT  He has not had a complete PFT but is a former smoker  He smoked 3-4 packs per day for 30+ years  He quit in 2001  PFT was done today and I reviewed the results with the patient  He is had a slight decrease in both FE the 1 and FVC  Forced vital capacity is 41% of predicted, FEV1 is 39% of predicted in 2017 FVC was 45% of predicted FEV1 was 44% of predicted  This is likely secondary to increase in body weight  He is gained 25+ lb since last year  He is also former smoker  He will continue with Advair 250/51 puff b i d  He does have nebulizer with DuoNeb and I encouraged him to use this at least minimally twice per day as the anti cholinergic medication could be of great benefit to him  He is willing to consider pulmonary rehabilitation

## 2018-04-04 NOTE — PATIENT INSTRUCTIONS
Dyspnea   WHAT YOU NEED TO KNOW:   Dyspnea is breathing difficulty or discomfort  You may have labored, painful, or shallow breathing  You may feel breathless or short of breath  Dyspnea can occur during rest or with activity  You may have dyspnea for a short time, or it might become chronic  Dyspnea is often a symptom of a disease or condition  DISCHARGE INSTRUCTIONS:   Return to the emergency department if:   · Your signs and symptoms are the same or worse within 24 hours of treatment  · You have shaking chills or a fever over 102°F      · You have new pain, pressure, or tightness in your chest      · You have a new or worse cough or wheezing, or you cough up blood  · You feel like you cannot get enough air  · The skin over your ribs or on your neck sinks in when you breathe  · You have a severe headache with vomiting and abdominal pain  · You feel confused or dizzy  Contact your healthcare provider or specialist if:   · You have questions or concerns about your condition or care  Medicines:   · Medicines  may be used to treat the cause of your dyspnea  Medicines may reduce swelling in your airway or decrease extra fluid from around your heart or lungs  Other medicines may be used to decrease anxiety and help you feel calm and relaxed  · Take your medicine as directed  Contact your healthcare provider if you think your medicine is not helping or if you have side effects  Tell him or her if you are allergic to any medicine  Keep a list of the medicines, vitamins, and herbs you take  Include the amounts, and when and why you take them  Bring the list or the pill bottles to follow-up visits  Carry your medicine list with you in case of an emergency  Manage long-term dyspnea:   · Create an action plan  You and your healthcare provider can work together to create a plan for how to handle episodes of dyspnea   The plan can include daily activities, treatment changes, and what to do if you have severe breathing problems  · Lean forward on your elbows when you sit  This helps your lungs expand and may make it easier to breathe  · Use pursed-lip breathing any time you feel short of breath  Breathe in through your nose and then slowly breathe out through your mouth with your lips slightly puckered  It should take you twice as long to breathe out as it did to breathe in  · Do not smoke  Nicotine and other chemicals in cigarettes and cigars can cause lung damage and make it harder to breathe  Ask your healthcare provider for information if you currently smoke and need help to quit  E-cigarettes or smokeless tobacco still contain nicotine  Talk to your healthcare provider before you use these products  · Reach or maintain a healthy weight  Your healthcare provider can help you create a safe weight loss plan if you are overweight  · Exercise as directed  Exercise can help your lungs work more easily  Exercise can also help you lose weight if needed  Try to get at least 30 minutes of exercise most days of the week  Your healthcare provider can help you create an exercise plan that is safe for you  Follow up with your healthcare provider or specialist as directed:  Write down your questions so you remember to ask them during your visits  © 2017 [employer]  Information is for End User's use only and may not be sold, redistributed or otherwise used for commercial purposes  All illustrations and images included in CareNotes® are the copyrighted property of [employer]  or [employer]  The above information is an  only  It is not intended as medical advice for individual conditions or treatments  Talk to your doctor, nurse or pharmacist before following any medical regimen to see if it is safe and effective for you

## 2018-04-17 DIAGNOSIS — E11.8 TYPE 2 DIABETES MELLITUS WITH COMPLICATION, WITH LONG-TERM CURRENT USE OF INSULIN (HCC): ICD-10-CM

## 2018-04-17 DIAGNOSIS — I10 ESSENTIAL HYPERTENSION: Primary | ICD-10-CM

## 2018-04-17 DIAGNOSIS — Z79.4 TYPE 2 DIABETES MELLITUS WITH COMPLICATION, WITH LONG-TERM CURRENT USE OF INSULIN (HCC): ICD-10-CM

## 2018-04-17 RX ORDER — AMLODIPINE BESYLATE 5 MG/1
5 TABLET ORAL 2 TIMES DAILY
Qty: 180 TABLET | Refills: 0 | Status: SHIPPED | OUTPATIENT
Start: 2018-04-17 | End: 2018-08-29 | Stop reason: SDUPTHER

## 2018-04-17 NOTE — TELEPHONE ENCOUNTER
Dr Anel Cash    Patient is requesting refill on victoza insulin and amlodipene 5 mg sent to [facility] pharmacy, [place]

## 2018-05-03 DIAGNOSIS — Z79.4 TYPE 2 DIABETES MELLITUS WITH COMPLICATION, WITH LONG-TERM CURRENT USE OF INSULIN (HCC): ICD-10-CM

## 2018-05-03 DIAGNOSIS — E11.8 TYPE 2 DIABETES MELLITUS WITH COMPLICATION, WITH LONG-TERM CURRENT USE OF INSULIN (HCC): ICD-10-CM

## 2018-05-15 ENCOUNTER — TRANSCRIBE ORDERS (OUTPATIENT)
Dept: ADMINISTRATIVE | Facility: HOSPITAL | Age: 58
End: 2018-05-15

## 2018-05-15 ENCOUNTER — APPOINTMENT (OUTPATIENT)
Dept: LAB | Facility: HOSPITAL | Age: 58
End: 2018-05-15
Payer: MEDICARE

## 2018-05-15 DIAGNOSIS — IMO0001 UNCONTROLLED TYPE 2 DIABETES MELLITUS WITHOUT COMPLICATION, WITH LONG-TERM CURRENT USE OF INSULIN: Primary | ICD-10-CM

## 2018-05-15 DIAGNOSIS — E66.01 MORBID OBESITY (HCC): ICD-10-CM

## 2018-05-15 DIAGNOSIS — IMO0001 UNCONTROLLED TYPE 2 DIABETES MELLITUS WITHOUT COMPLICATION, WITH LONG-TERM CURRENT USE OF INSULIN: ICD-10-CM

## 2018-05-15 DIAGNOSIS — I10 ESSENTIAL HYPERTENSION, MALIGNANT: ICD-10-CM

## 2018-05-15 DIAGNOSIS — E78.2 MIXED HYPERLIPIDEMIA: ICD-10-CM

## 2018-05-15 LAB
25(OH)D3 SERPL-MCNC: 14.6 NG/ML (ref 30–100)
ALBUMIN SERPL BCP-MCNC: 3.7 G/DL (ref 3.5–5)
ALP SERPL-CCNC: 60 U/L (ref 46–116)
ALT SERPL W P-5'-P-CCNC: 58 U/L (ref 12–78)
ANION GAP SERPL CALCULATED.3IONS-SCNC: 10 MMOL/L (ref 4–13)
AST SERPL W P-5'-P-CCNC: 32 U/L (ref 5–45)
BILIRUB DIRECT SERPL-MCNC: 0.1 MG/DL (ref 0–0.2)
BILIRUB SERPL-MCNC: 0.3 MG/DL (ref 0.2–1)
BUN SERPL-MCNC: 26 MG/DL (ref 5–25)
CALCIUM SERPL-MCNC: 9.1 MG/DL (ref 8.3–10.1)
CHLORIDE SERPL-SCNC: 98 MMOL/L (ref 100–108)
CHOLEST SERPL-MCNC: 202 MG/DL (ref 50–200)
CO2 SERPL-SCNC: 25 MMOL/L (ref 21–32)
CORTIS SERPL-MCNC: 23.9 UG/DL
CREAT SERPL-MCNC: 1.03 MG/DL (ref 0.6–1.3)
CREAT UR-MCNC: 123 MG/DL
EST. AVERAGE GLUCOSE BLD GHB EST-MCNC: 183 MG/DL
GFR SERPL CREATININE-BSD FRML MDRD: 80 ML/MIN/1.73SQ M
GLUCOSE P FAST SERPL-MCNC: 223 MG/DL (ref 65–99)
HBA1C MFR BLD: 8 % (ref 4.2–6.3)
HDLC SERPL-MCNC: 40 MG/DL (ref 40–60)
MAGNESIUM SERPL-MCNC: 1.8 MG/DL (ref 1.6–2.6)
MICROALBUMIN UR-MCNC: 7 MG/L (ref 0–20)
MICROALBUMIN/CREAT 24H UR: 6 MG/G CREATININE (ref 0–30)
NONHDLC SERPL-MCNC: 162 MG/DL
PHOSPHATE SERPL-MCNC: 3.4 MG/DL (ref 2.7–4.5)
POTASSIUM SERPL-SCNC: 4.4 MMOL/L (ref 3.5–5.3)
PROT SERPL-MCNC: 7.1 G/DL (ref 6.4–8.2)
SODIUM SERPL-SCNC: 133 MMOL/L (ref 136–145)
T4 FREE SERPL-MCNC: 0.99 NG/DL (ref 0.76–1.46)
TRIGL SERPL-MCNC: 686 MG/DL
TSH SERPL DL<=0.05 MIU/L-ACNC: 1.64 UIU/ML (ref 0.36–3.74)

## 2018-05-15 PROCEDURE — 83735 ASSAY OF MAGNESIUM: CPT

## 2018-05-15 PROCEDURE — 82043 UR ALBUMIN QUANTITATIVE: CPT | Performed by: INTERNAL MEDICINE

## 2018-05-15 PROCEDURE — 80061 LIPID PANEL: CPT

## 2018-05-15 PROCEDURE — 80048 BASIC METABOLIC PNL TOTAL CA: CPT | Performed by: INTERNAL MEDICINE

## 2018-05-15 PROCEDURE — 82570 ASSAY OF URINE CREATININE: CPT | Performed by: INTERNAL MEDICINE

## 2018-05-15 PROCEDURE — 84100 ASSAY OF PHOSPHORUS: CPT

## 2018-05-15 PROCEDURE — 84443 ASSAY THYROID STIM HORMONE: CPT

## 2018-05-15 PROCEDURE — 80076 HEPATIC FUNCTION PANEL: CPT

## 2018-05-15 PROCEDURE — 82533 TOTAL CORTISOL: CPT

## 2018-05-15 PROCEDURE — 83036 HEMOGLOBIN GLYCOSYLATED A1C: CPT

## 2018-05-15 PROCEDURE — 36415 COLL VENOUS BLD VENIPUNCTURE: CPT | Performed by: INTERNAL MEDICINE

## 2018-05-15 PROCEDURE — 82306 VITAMIN D 25 HYDROXY: CPT

## 2018-05-15 PROCEDURE — 84439 ASSAY OF FREE THYROXINE: CPT

## 2018-06-13 ENCOUNTER — TELEPHONE (OUTPATIENT)
Dept: FAMILY MEDICINE CLINIC | Facility: CLINIC | Age: 58
End: 2018-06-13

## 2018-06-13 DIAGNOSIS — E11.9 TYPE 2 DIABETES MELLITUS WITHOUT COMPLICATION, WITH LONG-TERM CURRENT USE OF INSULIN (HCC): Primary | ICD-10-CM

## 2018-06-13 DIAGNOSIS — Z79.4 TYPE 2 DIABETES MELLITUS WITHOUT COMPLICATION, WITH LONG-TERM CURRENT USE OF INSULIN (HCC): Primary | ICD-10-CM

## 2018-06-13 RX ORDER — FLASH GLUCOSE SENSOR
1 KIT MISCELLANEOUS ONCE
Qty: 1 DEVICE | Refills: 0 | Status: SHIPPED | OUTPATIENT
Start: 2018-06-13 | End: 2018-06-13

## 2018-06-13 NOTE — TELEPHONE ENCOUNTER
Dr Kina Sousa they have faxed 2 requests for order for [facility] continuous glucose monitor  Faxed requests sent 5/15 and 6/1, no response to date  Please advise  I advised that Dr Angela Carroll is out of office till next week

## 2018-06-15 DIAGNOSIS — Z79.4 TYPE 2 DIABETES MELLITUS WITH COMPLICATION, WITH LONG-TERM CURRENT USE OF INSULIN (HCC): ICD-10-CM

## 2018-06-15 DIAGNOSIS — E11.8 TYPE 2 DIABETES MELLITUS WITH COMPLICATION, WITH LONG-TERM CURRENT USE OF INSULIN (HCC): ICD-10-CM

## 2018-06-15 NOTE — TELEPHONE ENCOUNTER
Pt was ref to endocrinologist at last visit -please confirm w pt or specialist type of monitor and how often he is testing-ty

## 2018-06-16 RX ORDER — SITAGLIPTIN 50 MG/1
50 TABLET, FILM COATED ORAL DAILY
Qty: 30 TABLET | Refills: 0 | Status: SHIPPED | OUTPATIENT
Start: 2018-06-16 | End: 2018-10-05

## 2018-07-11 ENCOUNTER — TRANSCRIBE ORDERS (OUTPATIENT)
Dept: ADMINISTRATIVE | Facility: HOSPITAL | Age: 58
End: 2018-07-11

## 2018-07-11 ENCOUNTER — APPOINTMENT (OUTPATIENT)
Dept: LAB | Facility: HOSPITAL | Age: 58
End: 2018-07-11
Payer: MEDICARE

## 2018-07-11 DIAGNOSIS — E11.8 UNCONTROLLED TYPE 2 DIABETES MELLITUS WITH COMPLICATION, UNSPECIFIED WHETHER LONG TERM INSULIN USE: Primary | ICD-10-CM

## 2018-07-11 DIAGNOSIS — E11.65 UNCONTROLLED TYPE 2 DIABETES MELLITUS WITH COMPLICATION, UNSPECIFIED WHETHER LONG TERM INSULIN USE: Primary | ICD-10-CM

## 2018-07-11 DIAGNOSIS — E11.65 UNCONTROLLED TYPE 2 DIABETES MELLITUS WITH COMPLICATION, UNSPECIFIED WHETHER LONG TERM INSULIN USE: ICD-10-CM

## 2018-07-11 DIAGNOSIS — E78.2 MIXED HYPERLIPIDEMIA: ICD-10-CM

## 2018-07-11 DIAGNOSIS — E11.8 UNCONTROLLED TYPE 2 DIABETES MELLITUS WITH COMPLICATION, UNSPECIFIED WHETHER LONG TERM INSULIN USE: ICD-10-CM

## 2018-07-11 LAB
ALBUMIN SERPL BCP-MCNC: 4 G/DL (ref 3.5–5)
ALP SERPL-CCNC: 90 U/L (ref 46–116)
ALT SERPL W P-5'-P-CCNC: 32 U/L (ref 12–78)
ANION GAP SERPL CALCULATED.3IONS-SCNC: 8 MMOL/L (ref 4–13)
AST SERPL W P-5'-P-CCNC: 22 U/L (ref 5–45)
BILIRUB SERPL-MCNC: 0.3 MG/DL (ref 0.2–1)
BUN SERPL-MCNC: 31 MG/DL (ref 5–25)
CALCIUM SERPL-MCNC: 9.5 MG/DL (ref 8.3–10.1)
CHLORIDE SERPL-SCNC: 100 MMOL/L (ref 100–108)
CHOLEST SERPL-MCNC: 131 MG/DL (ref 50–200)
CO2 SERPL-SCNC: 27 MMOL/L (ref 21–32)
CREAT SERPL-MCNC: 1.15 MG/DL (ref 0.6–1.3)
GFR SERPL CREATININE-BSD FRML MDRD: 70 ML/MIN/1.73SQ M
GLUCOSE P FAST SERPL-MCNC: 264 MG/DL (ref 65–99)
HDLC SERPL-MCNC: 38 MG/DL (ref 40–60)
LDLC SERPL CALC-MCNC: 35 MG/DL (ref 0–100)
MAGNESIUM SERPL-MCNC: 1.9 MG/DL (ref 1.6–2.6)
NONHDLC SERPL-MCNC: 93 MG/DL
PHOSPHATE SERPL-MCNC: 2.8 MG/DL (ref 2.7–4.5)
POTASSIUM SERPL-SCNC: 4.7 MMOL/L (ref 3.5–5.3)
PROT SERPL-MCNC: 7.1 G/DL (ref 6.4–8.2)
SODIUM SERPL-SCNC: 135 MMOL/L (ref 136–145)
TRIGL SERPL-MCNC: 288 MG/DL

## 2018-07-11 PROCEDURE — 83735 ASSAY OF MAGNESIUM: CPT

## 2018-07-11 PROCEDURE — 36415 COLL VENOUS BLD VENIPUNCTURE: CPT | Performed by: INTERNAL MEDICINE

## 2018-07-11 PROCEDURE — 84100 ASSAY OF PHOSPHORUS: CPT

## 2018-07-11 PROCEDURE — 80061 LIPID PANEL: CPT | Performed by: INTERNAL MEDICINE

## 2018-07-11 PROCEDURE — 80053 COMPREHEN METABOLIC PANEL: CPT | Performed by: INTERNAL MEDICINE

## 2018-08-03 ENCOUNTER — APPOINTMENT (OUTPATIENT)
Dept: LAB | Facility: HOSPITAL | Age: 58
End: 2018-08-03
Payer: MEDICARE

## 2018-08-03 ENCOUNTER — TRANSCRIBE ORDERS (OUTPATIENT)
Dept: ADMINISTRATIVE | Facility: HOSPITAL | Age: 58
End: 2018-08-03

## 2018-08-03 DIAGNOSIS — E78.5 HYPERLIPIDEMIA, UNSPECIFIED HYPERLIPIDEMIA TYPE: ICD-10-CM

## 2018-08-03 DIAGNOSIS — E11.65 UNCONTROLLED TYPE 2 DIABETES MELLITUS WITH COMPLICATION, UNSPECIFIED WHETHER LONG TERM INSULIN USE: Primary | ICD-10-CM

## 2018-08-03 DIAGNOSIS — E11.8 UNCONTROLLED TYPE 2 DIABETES MELLITUS WITH COMPLICATION, UNSPECIFIED WHETHER LONG TERM INSULIN USE: Primary | ICD-10-CM

## 2018-08-03 DIAGNOSIS — E11.65 UNCONTROLLED TYPE 2 DIABETES MELLITUS WITH COMPLICATION, UNSPECIFIED WHETHER LONG TERM INSULIN USE: ICD-10-CM

## 2018-08-03 DIAGNOSIS — E55.9 VITAMIN D DEFICIENCY DISEASE: ICD-10-CM

## 2018-08-03 DIAGNOSIS — E11.8 UNCONTROLLED TYPE 2 DIABETES MELLITUS WITH COMPLICATION, UNSPECIFIED WHETHER LONG TERM INSULIN USE: ICD-10-CM

## 2018-08-03 LAB
25(OH)D3 SERPL-MCNC: 19.3 NG/ML (ref 30–100)
ALBUMIN SERPL BCP-MCNC: 4.1 G/DL (ref 3.5–5)
ALP SERPL-CCNC: 59 U/L (ref 46–116)
ALT SERPL W P-5'-P-CCNC: 42 U/L (ref 12–78)
ANION GAP SERPL CALCULATED.3IONS-SCNC: 9 MMOL/L (ref 4–13)
AST SERPL W P-5'-P-CCNC: 29 U/L (ref 5–45)
BILIRUB DIRECT SERPL-MCNC: 0.1 MG/DL (ref 0–0.2)
BILIRUB SERPL-MCNC: 0.4 MG/DL (ref 0.2–1)
BUN SERPL-MCNC: 31 MG/DL (ref 5–25)
CALCIUM SERPL-MCNC: 9.2 MG/DL (ref 8.3–10.1)
CHLORIDE SERPL-SCNC: 100 MMOL/L (ref 100–108)
CHOLEST SERPL-MCNC: 157 MG/DL (ref 50–200)
CO2 SERPL-SCNC: 26 MMOL/L (ref 21–32)
CREAT SERPL-MCNC: 1.11 MG/DL (ref 0.6–1.3)
EST. AVERAGE GLUCOSE BLD GHB EST-MCNC: 171 MG/DL
GFR SERPL CREATININE-BSD FRML MDRD: 73 ML/MIN/1.73SQ M
GLUCOSE P FAST SERPL-MCNC: 235 MG/DL (ref 65–99)
HBA1C MFR BLD: 7.6 % (ref 4.2–6.3)
HDLC SERPL-MCNC: 40 MG/DL (ref 40–60)
MAGNESIUM SERPL-MCNC: 2.2 MG/DL (ref 1.6–2.6)
NONHDLC SERPL-MCNC: 117 MG/DL
PHOSPHATE SERPL-MCNC: 3 MG/DL (ref 2.7–4.5)
POTASSIUM SERPL-SCNC: 4.5 MMOL/L (ref 3.5–5.3)
PROT SERPL-MCNC: 7.5 G/DL (ref 6.4–8.2)
SODIUM SERPL-SCNC: 135 MMOL/L (ref 136–145)
TRIGL SERPL-MCNC: 450 MG/DL

## 2018-08-03 PROCEDURE — 83036 HEMOGLOBIN GLYCOSYLATED A1C: CPT

## 2018-08-03 PROCEDURE — 84100 ASSAY OF PHOSPHORUS: CPT

## 2018-08-03 PROCEDURE — 82306 VITAMIN D 25 HYDROXY: CPT

## 2018-08-03 PROCEDURE — 80048 BASIC METABOLIC PNL TOTAL CA: CPT

## 2018-08-03 PROCEDURE — 80076 HEPATIC FUNCTION PANEL: CPT

## 2018-08-03 PROCEDURE — 36415 COLL VENOUS BLD VENIPUNCTURE: CPT

## 2018-08-03 PROCEDURE — 83735 ASSAY OF MAGNESIUM: CPT

## 2018-08-03 PROCEDURE — 80061 LIPID PANEL: CPT

## 2018-08-13 DIAGNOSIS — E11.8 TYPE 2 DIABETES MELLITUS WITH COMPLICATION, WITH LONG-TERM CURRENT USE OF INSULIN (HCC): ICD-10-CM

## 2018-08-13 DIAGNOSIS — Z79.4 TYPE 2 DIABETES MELLITUS WITH COMPLICATION, WITH LONG-TERM CURRENT USE OF INSULIN (HCC): ICD-10-CM

## 2018-08-14 RX ORDER — PEN NEEDLE, DIABETIC 31 GX3/16"
NEEDLE, DISPOSABLE MISCELLANEOUS
Qty: 100 EACH | Refills: 1 | Status: SHIPPED | OUTPATIENT
Start: 2018-08-14 | End: 2019-06-21 | Stop reason: SDUPTHER

## 2018-08-24 LAB
LEFT EYE DIABETIC RETINOPATHY: NORMAL
RIGHT EYE DIABETIC RETINOPATHY: NORMAL
SEVERITY (EYE EXAM): NORMAL

## 2018-08-29 ENCOUNTER — APPOINTMENT (OUTPATIENT)
Dept: LAB | Facility: CLINIC | Age: 58
End: 2018-08-29
Payer: MEDICARE

## 2018-08-29 ENCOUNTER — OFFICE VISIT (OUTPATIENT)
Dept: FAMILY MEDICINE CLINIC | Facility: CLINIC | Age: 58
End: 2018-08-29
Payer: MEDICARE

## 2018-08-29 VITALS
SYSTOLIC BLOOD PRESSURE: 140 MMHG | BODY MASS INDEX: 40.65 KG/M2 | HEART RATE: 76 BPM | HEIGHT: 71 IN | RESPIRATION RATE: 18 BRPM | WEIGHT: 290.4 LBS | DIASTOLIC BLOOD PRESSURE: 74 MMHG | TEMPERATURE: 96.3 F

## 2018-08-29 DIAGNOSIS — I10 ESSENTIAL HYPERTENSION: ICD-10-CM

## 2018-08-29 DIAGNOSIS — E11.65 TYPE 2 DIABETES MELLITUS WITH HYPERGLYCEMIA, WITH LONG-TERM CURRENT USE OF INSULIN (HCC): ICD-10-CM

## 2018-08-29 DIAGNOSIS — R53.82 CHRONIC FATIGUE: ICD-10-CM

## 2018-08-29 DIAGNOSIS — R53.82 CHRONIC FATIGUE: Primary | ICD-10-CM

## 2018-08-29 DIAGNOSIS — E78.5 HYPERLIPIDEMIA, UNSPECIFIED HYPERLIPIDEMIA TYPE: ICD-10-CM

## 2018-08-29 DIAGNOSIS — M25.50 ARTHRALGIA, UNSPECIFIED JOINT: ICD-10-CM

## 2018-08-29 DIAGNOSIS — K21.9 GASTROESOPHAGEAL REFLUX DISEASE, ESOPHAGITIS PRESENCE NOT SPECIFIED: ICD-10-CM

## 2018-08-29 DIAGNOSIS — Z79.4 TYPE 2 DIABETES MELLITUS WITH HYPERGLYCEMIA, WITH LONG-TERM CURRENT USE OF INSULIN (HCC): ICD-10-CM

## 2018-08-29 LAB
BASOPHILS # BLD AUTO: 0.04 THOUSANDS/ΜL (ref 0–0.1)
BASOPHILS NFR BLD AUTO: 1 % (ref 0–1)
EOSINOPHIL # BLD AUTO: 0.14 THOUSAND/ΜL (ref 0–0.61)
EOSINOPHIL NFR BLD AUTO: 2 % (ref 0–6)
ERYTHROCYTE [DISTWIDTH] IN BLOOD BY AUTOMATED COUNT: 13 % (ref 11.6–15.1)
HCT VFR BLD AUTO: 47.7 % (ref 36.5–49.3)
HGB BLD-MCNC: 15.7 G/DL (ref 12–17)
IMM GRANULOCYTES # BLD AUTO: 0.07 THOUSAND/UL (ref 0–0.2)
IMM GRANULOCYTES NFR BLD AUTO: 1 % (ref 0–2)
LYMPHOCYTES # BLD AUTO: 3.97 THOUSANDS/ΜL (ref 0.6–4.47)
LYMPHOCYTES NFR BLD AUTO: 45 % (ref 14–44)
MCH RBC QN AUTO: 30.7 PG (ref 26.8–34.3)
MCHC RBC AUTO-ENTMCNC: 32.9 G/DL (ref 31.4–37.4)
MCV RBC AUTO: 93 FL (ref 82–98)
MONOCYTES # BLD AUTO: 0.56 THOUSAND/ΜL (ref 0.17–1.22)
MONOCYTES NFR BLD AUTO: 6 % (ref 4–12)
NEUTROPHILS # BLD AUTO: 3.91 THOUSANDS/ΜL (ref 1.85–7.62)
NEUTS SEG NFR BLD AUTO: 45 % (ref 43–75)
NRBC BLD AUTO-RTO: 0 /100 WBCS
PLATELET # BLD AUTO: 226 THOUSANDS/UL (ref 149–390)
PMV BLD AUTO: 11.1 FL (ref 8.9–12.7)
RBC # BLD AUTO: 5.12 MILLION/UL (ref 3.88–5.62)
WBC # BLD AUTO: 8.69 THOUSAND/UL (ref 4.31–10.16)

## 2018-08-29 PROCEDURE — 36415 COLL VENOUS BLD VENIPUNCTURE: CPT

## 2018-08-29 PROCEDURE — 86753 PROTOZOA ANTIBODY NOS: CPT

## 2018-08-29 PROCEDURE — 99214 OFFICE O/P EST MOD 30 MIN: CPT | Performed by: FAMILY MEDICINE

## 2018-08-29 PROCEDURE — 85025 COMPLETE CBC W/AUTO DIFF WBC: CPT

## 2018-08-29 PROCEDURE — 86618 LYME DISEASE ANTIBODY: CPT

## 2018-08-29 RX ORDER — LISINOPRIL 20 MG/1
20 TABLET ORAL DAILY
Qty: 90 TABLET | Refills: 3 | Status: SHIPPED | OUTPATIENT
Start: 2018-08-29 | End: 2018-12-11

## 2018-08-29 RX ORDER — OMEPRAZOLE 20 MG/1
20 CAPSULE, DELAYED RELEASE ORAL EVERY EVENING
Qty: 90 CAPSULE | Refills: 3 | Status: SHIPPED | OUTPATIENT
Start: 2018-08-29 | End: 2020-11-03

## 2018-08-29 RX ORDER — ATORVASTATIN CALCIUM 20 MG/1
20 TABLET, FILM COATED ORAL EVERY MORNING
COMMUNITY
End: 2019-03-26 | Stop reason: HOSPADM

## 2018-08-29 RX ORDER — LOVASTATIN 40 MG/1
40 TABLET ORAL
Qty: 90 TABLET | Refills: 3 | Status: SHIPPED | OUTPATIENT
Start: 2018-08-29 | End: 2019-09-13 | Stop reason: SDUPTHER

## 2018-08-29 RX ORDER — AMLODIPINE BESYLATE 5 MG/1
5 TABLET ORAL 2 TIMES DAILY
Qty: 180 TABLET | Refills: 3 | Status: SHIPPED | OUTPATIENT
Start: 2018-08-29 | End: 2018-10-11 | Stop reason: SDUPTHER

## 2018-08-29 RX ORDER — METFORMIN HYDROCHLORIDE 500 MG/1
500 TABLET, EXTENDED RELEASE ORAL 2 TIMES DAILY WITH MEALS
Refills: 2 | COMMUNITY
Start: 2018-08-13 | End: 2019-08-27 | Stop reason: SDUPTHER

## 2018-08-29 NOTE — PROGRESS NOTES
Assessment/Plan:        Subjective:      Patient ID: Olga Singh is a 61 y o  male  Chief Complaint   Patient presents with    Diabetes    Results     bw    Medication Refill       67-year-old patient in for follow-up on chronic conditions  Has been seeing endocrinologist Dr Madeleine Mcmillan reviewed in chart  Patient wearing continue glucose monitor and using meter  Blood glucose readings have been improving-he is due for labs at this time  Systolic BP borderline high  Denies headache, chest pain or dizziness at this time  Overall has been feeling better  Is trying to improve diet and increase activity as tolerated  Due for eye and foot care  The following portions of the patient's history were reviewed and updated as appropriate: allergies, current medications, past family history, past medical history, past social history, past surgical history and problem list      Review of Systems   HENT: Negative  Eyes:        Wears glasses   Respiratory: Negative for cough and shortness of breath  Cardiovascular: Negative for chest pain  Gastrointestinal: Negative for abdominal pain  Endocrine:        DM   Musculoskeletal: Positive for arthralgias and myalgias  Neurological: Negative  Psychiatric/Behavioral: Positive for decreased concentration and sleep disturbance  The patient is nervous/anxious  Objective:    /74 (BP Location: Left arm, Patient Position: Sitting, Cuff Size: Large)   Pulse 76   Temp (!) 96 3 °F (35 7 °C)   Resp 18   Ht 5' 11" (1 803 m)   Wt 132 kg (290 lb 6 4 oz)   BMI 40 50 kg/m²        Physical Exam   Constitutional: He is oriented to person, place, and time  Obese, chronically ill, NAD   HENT:   Mouth/Throat: No oropharyngeal exudate  Eyes: Conjunctivae are normal    Neck: Neck supple  Cardiovascular: Normal rate, regular rhythm and intact distal pulses  Pulmonary/Chest: Effort normal and breath sounds normal    Abdominal: Soft   Bowel sounds are normal  There is no tenderness  Neurological: He is alert and oriented to person, place, and time  No cranial nerve deficit  Skin: Skin is warm and dry  No rash noted  Psychiatric: He has a normal mood and affect  Nursing note and vitals reviewed  Labs;  Labs in chart were reviewed        Anshul Martinez MD

## 2018-08-30 LAB
B BURGDOR IGG SER IA-ACNC: 0.47
B BURGDOR IGM SER IA-ACNC: 0.3

## 2018-08-31 LAB
B MICROTI IGG TITR SER: NORMAL {TITER}
B MICROTI IGM TITR SER: NORMAL {TITER}

## 2018-09-19 ENCOUNTER — TRANSCRIBE ORDERS (OUTPATIENT)
Dept: ADMINISTRATIVE | Facility: HOSPITAL | Age: 58
End: 2018-09-19

## 2018-09-19 DIAGNOSIS — E04.1 THYROID NODULE: Primary | ICD-10-CM

## 2018-09-20 ENCOUNTER — HOSPITAL ENCOUNTER (OUTPATIENT)
Dept: RADIOLOGY | Facility: HOSPITAL | Age: 58
Discharge: HOME | End: 2018-09-20
Payer: MEDICARE

## 2018-09-20 DIAGNOSIS — E04.1 THYROID NODULE: ICD-10-CM

## 2018-09-20 PROCEDURE — 76536 US EXAM OF HEAD AND NECK: CPT

## 2018-10-05 NOTE — PRE-PROCEDURE INSTRUCTIONS
Pre-Surgery Instructions:   Medication Instructions    ADVAIR DISKUS 250-50 MCG/DOSE inhaler Patient was instructed by Physician and understands   albuterol (2 5 mg/3 mL) 0 083 % nebulizer solution Patient was instructed by Physician and understands   albuterol (PROAIR HFA) 90 mcg/act inhaler Patient was instructed by Physician and understands   ALPRAZolam Hobson Glance) 2 MG tablet Patient was instructed by Physician and understands   amLODIPine (NORVASC) 5 mg tablet Patient was instructed by Physician and understands   aspirin 81 MG tablet Patient was instructed by Physician and understands   atorvastatin (LIPITOR) 20 mg tablet Patient was instructed by Physician and understands   busPIRone (BUSPAR) 15 mg tablet Patient was instructed by Physician and understands   Cariprazine HCl (VRAYLAR) 4 5 MG CAPS Patient was instructed by Physician and understands   Dapagliflozin Propanediol (FARXIGA) 10 MG TABS Patient was instructed by Physician and understands   EASY TOUCH PEN NEEDLES 31G X 5 MM MISC Patient was instructed by Physician and understands   ergocalciferol (VITAMIN D2) 50,000 units Patient was instructed by Physician and understands   fenofibrate (TRIGLIDE) 160 MG tablet Patient was instructed by Physician and understands   glucose blood test strip Patient was instructed by Physician and understands   insulin aspart (NovoLOG) 100 units/mL injection Patient was instructed by Physician and understands   insulin glargine (BASAGLAR KWIKPEN) 100 units/mL injection pen Patient was instructed by Physician and understands   Liraglutide (VICTOZA) 18 MG/3ML SOPN Patient was instructed by Physician and understands   lisinopril (ZESTRIL) 20 mg tablet Patient was instructed by Physician and understands   lovastatin (MEVACOR) 40 MG tablet Patient was instructed by Physician and understands      metFORMIN (GLUCOPHAGE) 500 mg tablet Patient was instructed by Physician and understands   metFORMIN (GLUCOPHAGE-XR) 500 mg 24 hr tablet Patient was instructed by Physician and understands   metoprolol tartrate (LOPRESSOR) 25 mg tablet Patient was instructed by Physician and understands   mirtazapine (REMERON) 45 MG tablet Patient was instructed by Physician and understands   omeprazole (PriLOSEC) 20 mg delayed release capsule Patient was instructed by Physician and understands   pregabalin (LYRICA) 50 mg capsule Patient was instructed by Physician and understands   QUEtiapine (SEROquel XR) 400 mg 24 hr tablet Patient was instructed by Physician and understands   venlafaxine (EFFEXOR XR) 150 mg 24 hr capsule Patient was instructed by Physician and understands  Pt to follow Dr Aubrie Garcia instructions    Shreyas

## 2018-10-08 ENCOUNTER — OFFICE VISIT (OUTPATIENT)
Dept: PULMONOLOGY | Facility: MEDICAL CENTER | Age: 58
End: 2018-10-08
Payer: MEDICARE

## 2018-10-08 VITALS
HEART RATE: 98 BPM | DIASTOLIC BLOOD PRESSURE: 82 MMHG | WEIGHT: 298 LBS | OXYGEN SATURATION: 93 % | BODY MASS INDEX: 41.72 KG/M2 | RESPIRATION RATE: 12 BRPM | SYSTOLIC BLOOD PRESSURE: 136 MMHG | TEMPERATURE: 97.7 F | HEIGHT: 71 IN

## 2018-10-08 DIAGNOSIS — G47.33 OSA (OBSTRUCTIVE SLEEP APNEA): ICD-10-CM

## 2018-10-08 DIAGNOSIS — Z00.00 HEALTHCARE MAINTENANCE: Primary | ICD-10-CM

## 2018-10-08 DIAGNOSIS — J43.2 CENTRILOBULAR EMPHYSEMA (HCC): ICD-10-CM

## 2018-10-08 DIAGNOSIS — J44.1 OBSTRUCTIVE CHRONIC BRONCHITIS WITH ACUTE EXACERBATION (HCC): ICD-10-CM

## 2018-10-08 PROCEDURE — 99214 OFFICE O/P EST MOD 30 MIN: CPT | Performed by: NURSE PRACTITIONER

## 2018-10-08 PROCEDURE — G0008 ADMIN INFLUENZA VIRUS VAC: HCPCS

## 2018-10-08 PROCEDURE — 90686 IIV4 VACC NO PRSV 0.5 ML IM: CPT

## 2018-10-08 RX ORDER — ALBUTEROL SULFATE 90 UG/1
2 AEROSOL, METERED RESPIRATORY (INHALATION) 4 TIMES DAILY
Qty: 1 INHALER | Refills: 3 | Status: SHIPPED | OUTPATIENT
Start: 2018-10-08 | End: 2018-11-19 | Stop reason: HOSPADM
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
## 2018-10-08 NOTE — ASSESSMENT & PLAN NOTE
Porsha Campo has diagnosis of mild obstructive sleep apnea  He is currently using CPAP 9 cm with 2 L supplemental oxygen  He reports great benefit in is using this  His weight likely is contributing to this diagnosis and he is aware that weight loss would be of benefit  I did discuss with him weight watchers that he could download on his smart phone  He is willing to consider this  He was given his Fluzone today for influenza

## 2018-10-08 NOTE — PATIENT INSTRUCTIONS
Had year influenza vaccine today  I reordered her Advair for you  You appear to be using an benefitting from your CPAP and it is important to use every night  Follow-up in 6 months  Goal is to attempt to lose weight

## 2018-10-08 NOTE — ASSESSMENT & PLAN NOTE
Renu Poag has diagnosis of mild SHAVONNE  He is using an benefitting from CPAP 9 cm of water pressure with 2 L of supplemental oxygen  He is benefitting from the use and reports feeling refreshed in the morning  He is aware that weight loss would be of benefit

## 2018-10-08 NOTE — ASSESSMENT & PLAN NOTE
Cha Santacruz has mild centrilobular emphysema that was seen on CT of chest   PFTs were done in April of 2018 for which severe restrictive defect was seen  FVC was 2 10 L or 41% of predicted, FEV1 1 52 L or 39% obstruction ratio was 72%  Flow volume loop shows severe restrictive  He is moderately obese and does have shortness of breath that is likely contributing to this  He is currently on Advair 250/51 puff b i d  and also is using rescue inhalation as needed  He uses once or twice daily  Weight loss would be of benefit  Currently he is stable

## 2018-10-09 ENCOUNTER — ANESTHESIA EVENT (OUTPATIENT)
Dept: GASTROENTEROLOGY | Facility: AMBULARY SURGERY CENTER | Age: 58
End: 2018-10-09
Payer: MEDICARE

## 2018-10-10 ENCOUNTER — ANESTHESIA (OUTPATIENT)
Dept: GASTROENTEROLOGY | Facility: AMBULARY SURGERY CENTER | Age: 58
End: 2018-10-10
Payer: MEDICARE

## 2018-10-10 ENCOUNTER — HOSPITAL ENCOUNTER (OUTPATIENT)
Facility: AMBULARY SURGERY CENTER | Age: 58
Setting detail: OUTPATIENT SURGERY
Discharge: HOME | End: 2018-10-10
Attending: INTERNAL MEDICINE | Admitting: INTERNAL MEDICINE
Payer: MEDICARE

## 2018-10-10 VITALS
HEIGHT: 71 IN | WEIGHT: 290 LBS | BODY MASS INDEX: 40.6 KG/M2 | SYSTOLIC BLOOD PRESSURE: 143 MMHG | DIASTOLIC BLOOD PRESSURE: 85 MMHG | OXYGEN SATURATION: 93 % | TEMPERATURE: 98 F | RESPIRATION RATE: 18 BRPM | HEART RATE: 68 BPM

## 2018-10-10 DIAGNOSIS — K31.84 GASTROPARESIS: ICD-10-CM

## 2018-10-10 DIAGNOSIS — K29.60 OTHER GASTRITIS WITHOUT BLEEDING: ICD-10-CM

## 2018-10-10 LAB — GLUCOSE SERPL-MCNC: 183 MG/DL (ref 65–140)

## 2018-10-10 PROCEDURE — 88305 TISSUE EXAM BY PATHOLOGIST: CPT | Performed by: PATHOLOGY

## 2018-10-10 PROCEDURE — 88342 IMHCHEM/IMCYTCHM 1ST ANTB: CPT | Performed by: PATHOLOGY

## 2018-10-10 PROCEDURE — 82948 REAGENT STRIP/BLOOD GLUCOSE: CPT

## 2018-10-10 RX ORDER — FENTANYL CITRATE/PF 50 MCG/ML
25 SYRINGE (ML) INJECTION
Status: DISCONTINUED | OUTPATIENT
Start: 2018-10-10 | End: 2018-10-10 | Stop reason: HOSPADM

## 2018-10-10 RX ORDER — ONDANSETRON 2 MG/ML
4 INJECTION INTRAMUSCULAR; INTRAVENOUS ONCE AS NEEDED
Status: DISCONTINUED | OUTPATIENT
Start: 2018-10-10 | End: 2018-10-10 | Stop reason: HOSPADM

## 2018-10-10 RX ORDER — SODIUM CHLORIDE, SODIUM LACTATE, POTASSIUM CHLORIDE, CALCIUM CHLORIDE 600; 310; 30; 20 MG/100ML; MG/100ML; MG/100ML; MG/100ML
75 INJECTION, SOLUTION INTRAVENOUS CONTINUOUS
Status: DISCONTINUED | OUTPATIENT
Start: 2018-10-10 | End: 2018-10-10 | Stop reason: HOSPADM

## 2018-10-10 RX ORDER — PROPOFOL 10 MG/ML
INJECTION, EMULSION INTRAVENOUS AS NEEDED
Status: DISCONTINUED | OUTPATIENT
Start: 2018-10-10 | End: 2018-10-10 | Stop reason: SURG

## 2018-10-10 RX ADMIN — SODIUM CHLORIDE, SODIUM LACTATE, POTASSIUM CHLORIDE, AND CALCIUM CHLORIDE 75 ML/HR: .6; .31; .03; .02 INJECTION, SOLUTION INTRAVENOUS at 08:51

## 2018-10-10 RX ADMIN — PROPOFOL 100 MG: 10 INJECTION, EMULSION INTRAVENOUS at 09:08

## 2018-10-10 RX ADMIN — PROPOFOL 50 MG: 10 INJECTION, EMULSION INTRAVENOUS at 09:12

## 2018-10-10 NOTE — ANESTHESIA PREPROCEDURE EVALUATION
Bipolar affective disorder (HCC)   Chronic obstructive pulmonary disease (HCC)   Hypertension, essential   Diabetes mellitus type 2, uncontrolled (HCC)   Chronic fatigue   Fatigue   ANA LILIA (acute kidney injury) ([facility] [address] )   Psychiatric disorder   SHAVONNE (obstructive sleep apnea)   Morbid obesity (HCC)   COPD exacerbation (HCC)   Type 2 diabetes mellitus with hyperglycemia (HCC)   Intractable abdominal pain   CAD (coronary artery disease)   Esophageal reflux   Abdominal pain   Anal condyloma   Back pain with radiation   Benign essential hypertension   Chronic reflux esophagitis   Diabetic neuropathy (HCC)   Erectile dysfunction of organic origin   Homosexual behavior   Hyperlipidemia associated with type 2 diabetes mellitus (HCC)   Panic disorder without agoraphobia   Vitamin D deficiency   Abdominal discomfort   Obstructive chronic bronchitis with acute exacerbation (HCC)   Hyponatremia   Dyspnea on exertion   Centrilobular emphysema (HCC)       Review of Systems/Medical History  Patient summary reviewed  Chart reviewed      Cardiovascular  Hyperlipidemia, Hypertension , CAD ,    Pulmonary  COPD , Shortness of breath, Sleep apnea ,        GI/Hepatic    GERD ,        Kidney disease ,        Endo/Other  Diabetes ,   Obesity  super morbid obesity   GYN       Hematology   Musculoskeletal  Back pain ,        Neurology    Diabetic neuropathy,    Psychology   Psychiatric history, Anxiety, Depression , bipolar disorder,              Physical Exam    Airway    Mallampati score: II  TM Distance: >3 FB  Neck ROM: full     Dental       Cardiovascular  Rhythm: regular, Rate: normal,     Pulmonary  Breath sounds clear to auscultation,     Other Findings        Anesthesia Plan  ASA Score- 3     Anesthesia Type- IV sedation with anesthesia with ASA Monitors  Additional Monitors:   Airway Plan:         Plan Factors-    Induction- intravenous      Postoperative Plan-     Informed Consent- Anesthetic plan and risks discussed with patient
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
## 2018-10-11 DIAGNOSIS — I10 ESSENTIAL HYPERTENSION: ICD-10-CM

## 2018-10-11 RX ORDER — AMLODIPINE BESYLATE 5 MG/1
TABLET ORAL
Qty: 180 TABLET | Refills: 3 | Status: SHIPPED | OUTPATIENT
Start: 2018-10-11 | End: 2018-12-11

## 2018-10-29 ENCOUNTER — TRANSCRIBE ORDERS (OUTPATIENT)
Dept: ADMINISTRATIVE | Facility: HOSPITAL | Age: 58
End: 2018-10-29

## 2018-10-29 ENCOUNTER — APPOINTMENT (OUTPATIENT)
Dept: LAB | Facility: HOSPITAL | Age: 58
DRG: 871 | End: 2018-10-29
Payer: MEDICARE

## 2018-10-29 DIAGNOSIS — E55.9 VITAMIN D INSUFFICIENCY: ICD-10-CM

## 2018-10-29 DIAGNOSIS — E11.65 TYPE II DIABETES MELLITUS WITH HYPEROSMOLARITY, UNCONTROLLED (HCC): ICD-10-CM

## 2018-10-29 DIAGNOSIS — E04.1 NONTOXIC UNINODULAR GOITER: Primary | ICD-10-CM

## 2018-10-29 DIAGNOSIS — E78.2 MIXED HYPERLIPIDEMIA: ICD-10-CM

## 2018-10-29 DIAGNOSIS — Z79.4 TYPE 2 DIABETES MELLITUS WITH COMPLICATION, WITH LONG-TERM CURRENT USE OF INSULIN (HCC): ICD-10-CM

## 2018-10-29 DIAGNOSIS — E04.1 NONTOXIC UNINODULAR GOITER: ICD-10-CM

## 2018-10-29 DIAGNOSIS — E11.00 TYPE II DIABETES MELLITUS WITH HYPEROSMOLARITY, UNCONTROLLED (HCC): ICD-10-CM

## 2018-10-29 DIAGNOSIS — E11.8 TYPE 2 DIABETES MELLITUS WITH COMPLICATION, WITH LONG-TERM CURRENT USE OF INSULIN (HCC): ICD-10-CM

## 2018-10-29 LAB
25(OH)D3 SERPL-MCNC: 39.3 NG/ML (ref 30–100)
ALBUMIN SERPL BCP-MCNC: 3.7 G/DL (ref 3.5–5)
ALP SERPL-CCNC: 58 U/L (ref 46–116)
ALT SERPL W P-5'-P-CCNC: 49 U/L (ref 12–78)
ANION GAP SERPL CALCULATED.3IONS-SCNC: 12 MMOL/L (ref 4–13)
AST SERPL W P-5'-P-CCNC: 31 U/L (ref 5–45)
BILIRUB DIRECT SERPL-MCNC: 0.1 MG/DL (ref 0–0.2)
BILIRUB SERPL-MCNC: 0.4 MG/DL (ref 0.2–1)
BUN SERPL-MCNC: 19 MG/DL (ref 5–25)
CALCIUM SERPL-MCNC: 9.1 MG/DL (ref 8.3–10.1)
CHLORIDE SERPL-SCNC: 98 MMOL/L (ref 100–108)
CHOLEST SERPL-MCNC: 148 MG/DL (ref 50–200)
CO2 SERPL-SCNC: 26 MMOL/L (ref 21–32)
CREAT SERPL-MCNC: 1.16 MG/DL (ref 0.6–1.3)
EST. AVERAGE GLUCOSE BLD GHB EST-MCNC: 171 MG/DL
GFR SERPL CREATININE-BSD FRML MDRD: 69 ML/MIN/1.73SQ M
GLUCOSE P FAST SERPL-MCNC: 177 MG/DL (ref 65–99)
HBA1C MFR BLD: 7.6 % (ref 4.2–6.3)
HDLC SERPL-MCNC: 39 MG/DL (ref 40–60)
MAGNESIUM SERPL-MCNC: 2.1 MG/DL (ref 1.6–2.6)
NONHDLC SERPL-MCNC: 109 MG/DL
PHOSPHATE SERPL-MCNC: 3.6 MG/DL (ref 2.7–4.5)
POTASSIUM SERPL-SCNC: 4 MMOL/L (ref 3.5–5.3)
PROT SERPL-MCNC: 6.8 G/DL (ref 6.4–8.2)
SODIUM SERPL-SCNC: 136 MMOL/L (ref 136–145)
T4 FREE SERPL-MCNC: 1.01 NG/DL (ref 0.76–1.46)
TRIGL SERPL-MCNC: 451 MG/DL
TSH SERPL DL<=0.05 MIU/L-ACNC: 1.09 UIU/ML (ref 0.36–3.74)

## 2018-10-29 PROCEDURE — 83735 ASSAY OF MAGNESIUM: CPT

## 2018-10-29 PROCEDURE — 83036 HEMOGLOBIN GLYCOSYLATED A1C: CPT

## 2018-10-29 PROCEDURE — 84439 ASSAY OF FREE THYROXINE: CPT

## 2018-10-29 PROCEDURE — 84100 ASSAY OF PHOSPHORUS: CPT

## 2018-10-29 PROCEDURE — 80048 BASIC METABOLIC PNL TOTAL CA: CPT

## 2018-10-29 PROCEDURE — 84443 ASSAY THYROID STIM HORMONE: CPT

## 2018-10-29 PROCEDURE — 80061 LIPID PANEL: CPT

## 2018-10-29 PROCEDURE — 80076 HEPATIC FUNCTION PANEL: CPT

## 2018-10-29 PROCEDURE — 82306 VITAMIN D 25 HYDROXY: CPT

## 2018-10-29 PROCEDURE — 36415 COLL VENOUS BLD VENIPUNCTURE: CPT

## 2018-10-31 ENCOUNTER — APPOINTMENT (EMERGENCY)
Dept: RADIOLOGY | Facility: HOSPITAL | Age: 58
DRG: 871 | End: 2018-10-31
Payer: MEDICARE

## 2018-10-31 ENCOUNTER — HOSPITAL ENCOUNTER (INPATIENT)
Facility: HOSPITAL | Age: 58
LOS: 7 days | Discharge: HOME HEALTH | DRG: 871 | End: 2018-11-07
Attending: EMERGENCY MEDICINE | Admitting: INTERNAL MEDICINE
Payer: MEDICARE

## 2018-10-31 DIAGNOSIS — Z91.19 MEDICAL NON-COMPLIANCE: ICD-10-CM

## 2018-10-31 DIAGNOSIS — R07.81 PLEURITIC CHEST PAIN: ICD-10-CM

## 2018-10-31 DIAGNOSIS — J44.1 COPD EXACERBATION (HCC): Primary | ICD-10-CM

## 2018-10-31 DIAGNOSIS — J44.1 COPD WITH EXACERBATION (HCC): ICD-10-CM

## 2018-10-31 DIAGNOSIS — R07.89 CHEST WALL PAIN: ICD-10-CM

## 2018-10-31 PROBLEM — R10.9 INTRACTABLE ABDOMINAL PAIN: Status: RESOLVED | Noted: 2017-09-06 | Resolved: 2018-10-31

## 2018-10-31 PROBLEM — E87.1 HYPONATREMIA: Status: RESOLVED | Noted: 2017-04-12 | Resolved: 2018-10-31

## 2018-10-31 PROBLEM — R10.9 ABDOMINAL PAIN: Status: RESOLVED | Noted: 2017-09-06 | Resolved: 2018-10-31

## 2018-10-31 PROBLEM — A41.9 SEPSIS (HCC): Status: ACTIVE | Noted: 2018-10-31

## 2018-10-31 PROBLEM — J96.21 ACUTE ON CHRONIC RESPIRATORY FAILURE WITH HYPOXIA (HCC): Status: ACTIVE | Noted: 2018-10-31

## 2018-10-31 LAB
ALBUMIN SERPL BCP-MCNC: 4.1 G/DL (ref 3.5–5)
ALP SERPL-CCNC: 62 U/L (ref 46–116)
ALT SERPL W P-5'-P-CCNC: 55 U/L (ref 12–78)
ANION GAP SERPL CALCULATED.3IONS-SCNC: 14 MMOL/L (ref 4–13)
APTT PPP: 23 SECONDS (ref 24–33)
AST SERPL W P-5'-P-CCNC: 32 U/L (ref 5–45)
BASOPHILS # BLD AUTO: 0.05 THOUSANDS/ΜL (ref 0–0.1)
BASOPHILS NFR BLD AUTO: 0 % (ref 0–1)
BILIRUB SERPL-MCNC: 0.5 MG/DL (ref 0.2–1)
BUN SERPL-MCNC: 24 MG/DL (ref 5–25)
CALCIUM SERPL-MCNC: 9.9 MG/DL (ref 8.3–10.1)
CHLORIDE SERPL-SCNC: 100 MMOL/L (ref 100–108)
CO2 SERPL-SCNC: 23 MMOL/L (ref 21–32)
CREAT SERPL-MCNC: 1.1 MG/DL (ref 0.6–1.3)
EOSINOPHIL # BLD AUTO: 0.07 THOUSAND/ΜL (ref 0–0.61)
EOSINOPHIL NFR BLD AUTO: 1 % (ref 0–6)
ERYTHROCYTE [DISTWIDTH] IN BLOOD BY AUTOMATED COUNT: 12.6 % (ref 11.6–15.1)
GFR SERPL CREATININE-BSD FRML MDRD: 73 ML/MIN/1.73SQ M
GLUCOSE SERPL-MCNC: 251 MG/DL (ref 65–140)
GLUCOSE SERPL-MCNC: 398 MG/DL (ref 65–140)
GLUCOSE SERPL-MCNC: 403 MG/DL (ref 65–140)
HCT VFR BLD AUTO: 47.6 % (ref 36.5–49.3)
HGB BLD-MCNC: 15.9 G/DL (ref 12–17)
IMM GRANULOCYTES # BLD AUTO: 0.11 THOUSAND/UL (ref 0–0.2)
IMM GRANULOCYTES NFR BLD AUTO: 1 % (ref 0–2)
INR PPP: 0.98 (ref 0.86–1.16)
LACTATE SERPL-SCNC: 2.1 MMOL/L (ref 0.5–2)
LACTATE SERPL-SCNC: 2.3 MMOL/L (ref 0.5–2)
LACTATE SERPL-SCNC: 2.8 MMOL/L (ref 0.5–2)
LYMPHOCYTES # BLD AUTO: 2.99 THOUSANDS/ΜL (ref 0.6–4.47)
LYMPHOCYTES NFR BLD AUTO: 21 % (ref 14–44)
MCH RBC QN AUTO: 31.4 PG (ref 26.8–34.3)
MCHC RBC AUTO-ENTMCNC: 33.4 G/DL (ref 31.4–37.4)
MCV RBC AUTO: 94 FL (ref 82–98)
MONOCYTES # BLD AUTO: 0.74 THOUSAND/ΜL (ref 0.17–1.22)
MONOCYTES NFR BLD AUTO: 5 % (ref 4–12)
NEUTROPHILS # BLD AUTO: 10.16 THOUSANDS/ΜL (ref 1.85–7.62)
NEUTS SEG NFR BLD AUTO: 72 % (ref 43–75)
NRBC BLD AUTO-RTO: 0 /100 WBCS
NT-PROBNP SERPL-MCNC: 36 PG/ML
PLATELET # BLD AUTO: 203 THOUSANDS/UL (ref 149–390)
PMV BLD AUTO: 9.8 FL (ref 8.9–12.7)
POTASSIUM SERPL-SCNC: 4.3 MMOL/L (ref 3.5–5.3)
PROCALCITONIN SERPL-MCNC: 0.1 NG/ML
PROT SERPL-MCNC: 7.6 G/DL (ref 6.4–8.2)
PROTHROMBIN TIME: 10.3 SECONDS (ref 9.4–11.7)
RBC # BLD AUTO: 5.07 MILLION/UL (ref 3.88–5.62)
SODIUM SERPL-SCNC: 137 MMOL/L (ref 136–145)
TROPONIN I SERPL-MCNC: <0.02 NG/ML
WBC # BLD AUTO: 14.12 THOUSAND/UL (ref 4.31–10.16)

## 2018-10-31 PROCEDURE — 94640 AIRWAY INHALATION TREATMENT: CPT

## 2018-10-31 PROCEDURE — 85610 PROTHROMBIN TIME: CPT | Performed by: EMERGENCY MEDICINE

## 2018-10-31 PROCEDURE — 87081 CULTURE SCREEN ONLY: CPT | Performed by: STUDENT IN AN ORGANIZED HEALTH CARE EDUCATION/TRAINING PROGRAM

## 2018-10-31 PROCEDURE — 83605 ASSAY OF LACTIC ACID: CPT | Performed by: STUDENT IN AN ORGANIZED HEALTH CARE EDUCATION/TRAINING PROGRAM

## 2018-10-31 PROCEDURE — 94664 DEMO&/EVAL PT USE INHALER: CPT

## 2018-10-31 PROCEDURE — 99285 EMERGENCY DEPT VISIT HI MDM: CPT

## 2018-10-31 PROCEDURE — 83880 ASSAY OF NATRIURETIC PEPTIDE: CPT | Performed by: EMERGENCY MEDICINE

## 2018-10-31 PROCEDURE — 87633 RESP VIRUS 12-25 TARGETS: CPT | Performed by: STUDENT IN AN ORGANIZED HEALTH CARE EDUCATION/TRAINING PROGRAM

## 2018-10-31 PROCEDURE — 85730 THROMBOPLASTIN TIME PARTIAL: CPT | Performed by: EMERGENCY MEDICINE

## 2018-10-31 PROCEDURE — 71275 CT ANGIOGRAPHY CHEST: CPT

## 2018-10-31 PROCEDURE — 36415 COLL VENOUS BLD VENIPUNCTURE: CPT | Performed by: EMERGENCY MEDICINE

## 2018-10-31 PROCEDURE — 80053 COMPREHEN METABOLIC PANEL: CPT | Performed by: EMERGENCY MEDICINE

## 2018-10-31 PROCEDURE — 71045 X-RAY EXAM CHEST 1 VIEW: CPT

## 2018-10-31 PROCEDURE — 83605 ASSAY OF LACTIC ACID: CPT | Performed by: EMERGENCY MEDICINE

## 2018-10-31 PROCEDURE — 99223 1ST HOSP IP/OBS HIGH 75: CPT | Performed by: STUDENT IN AN ORGANIZED HEALTH CARE EDUCATION/TRAINING PROGRAM

## 2018-10-31 PROCEDURE — 94760 N-INVAS EAR/PLS OXIMETRY 1: CPT

## 2018-10-31 PROCEDURE — 99222 1ST HOSP IP/OBS MODERATE 55: CPT | Performed by: INTERNAL MEDICINE

## 2018-10-31 PROCEDURE — 94660 CPAP INITIATION&MGMT: CPT

## 2018-10-31 PROCEDURE — 85025 COMPLETE CBC W/AUTO DIFF WBC: CPT | Performed by: EMERGENCY MEDICINE

## 2018-10-31 PROCEDURE — 96365 THER/PROPH/DIAG IV INF INIT: CPT

## 2018-10-31 PROCEDURE — 87040 BLOOD CULTURE FOR BACTERIA: CPT | Performed by: EMERGENCY MEDICINE

## 2018-10-31 PROCEDURE — 84145 PROCALCITONIN (PCT): CPT | Performed by: STUDENT IN AN ORGANIZED HEALTH CARE EDUCATION/TRAINING PROGRAM

## 2018-10-31 PROCEDURE — 96375 TX/PRO/DX INJ NEW DRUG ADDON: CPT

## 2018-10-31 PROCEDURE — 87631 RESP VIRUS 3-5 TARGETS: CPT | Performed by: STUDENT IN AN ORGANIZED HEALTH CARE EDUCATION/TRAINING PROGRAM

## 2018-10-31 PROCEDURE — 82948 REAGENT STRIP/BLOOD GLUCOSE: CPT

## 2018-10-31 PROCEDURE — 94668 MNPJ CHEST WALL SBSQ: CPT

## 2018-10-31 PROCEDURE — 84484 ASSAY OF TROPONIN QUANT: CPT | Performed by: EMERGENCY MEDICINE

## 2018-10-31 PROCEDURE — 93005 ELECTROCARDIOGRAM TRACING: CPT

## 2018-10-31 RX ORDER — IBUPROFEN 400 MG/1
400 TABLET ORAL EVERY 6 HOURS PRN
Status: DISCONTINUED | OUTPATIENT
Start: 2018-10-31 | End: 2018-11-07 | Stop reason: HOSPADM

## 2018-10-31 RX ORDER — PREGABALIN 50 MG/1
50 CAPSULE ORAL 3 TIMES DAILY
Status: DISCONTINUED | OUTPATIENT
Start: 2018-10-31 | End: 2018-11-07 | Stop reason: HOSPADM

## 2018-10-31 RX ORDER — INSULIN GLARGINE 100 [IU]/ML
60 INJECTION, SOLUTION SUBCUTANEOUS
Status: DISCONTINUED | OUTPATIENT
Start: 2018-10-31 | End: 2018-11-07 | Stop reason: HOSPADM

## 2018-10-31 RX ORDER — ONDANSETRON 2 MG/ML
4 INJECTION INTRAMUSCULAR; INTRAVENOUS EVERY 6 HOURS PRN
Status: DISCONTINUED | OUTPATIENT
Start: 2018-10-31 | End: 2018-11-07 | Stop reason: HOSPADM

## 2018-10-31 RX ORDER — LEVALBUTEROL 1.25 MG/.5ML
1.25 SOLUTION, CONCENTRATE RESPIRATORY (INHALATION)
Status: DISCONTINUED | OUTPATIENT
Start: 2018-10-31 | End: 2018-11-07 | Stop reason: HOSPADM

## 2018-10-31 RX ORDER — ALPRAZOLAM 0.5 MG/1
2 TABLET ORAL
Status: DISCONTINUED | OUTPATIENT
Start: 2018-10-31 | End: 2018-11-07 | Stop reason: HOSPADM

## 2018-10-31 RX ORDER — GUAIFENESIN 600 MG
600 TABLET, EXTENDED RELEASE 12 HR ORAL EVERY 12 HOURS SCHEDULED
Status: DISCONTINUED | OUTPATIENT
Start: 2018-10-31 | End: 2018-11-01

## 2018-10-31 RX ORDER — HYDROMORPHONE HCL/PF 1 MG/ML
1 SYRINGE (ML) INJECTION ONCE
Status: COMPLETED | OUTPATIENT
Start: 2018-10-31 | End: 2018-10-31

## 2018-10-31 RX ORDER — LEVALBUTEROL INHALATION SOLUTION 0.63 MG/3ML
0.63 SOLUTION RESPIRATORY (INHALATION)
Status: DISCONTINUED | OUTPATIENT
Start: 2018-10-31 | End: 2018-10-31

## 2018-10-31 RX ORDER — KETOROLAC TROMETHAMINE 30 MG/ML
15 INJECTION, SOLUTION INTRAMUSCULAR; INTRAVENOUS ONCE
Status: COMPLETED | OUTPATIENT
Start: 2018-10-31 | End: 2018-10-31

## 2018-10-31 RX ORDER — LEVALBUTEROL INHALATION SOLUTION 0.63 MG/3ML
0.63 SOLUTION RESPIRATORY (INHALATION) EVERY 2 HOUR PRN
Status: DISCONTINUED | OUTPATIENT
Start: 2018-10-31 | End: 2018-11-07 | Stop reason: HOSPADM

## 2018-10-31 RX ORDER — QUETIAPINE 200 MG/1
400 TABLET, FILM COATED, EXTENDED RELEASE ORAL
Status: DISCONTINUED | OUTPATIENT
Start: 2018-10-31 | End: 2018-11-07 | Stop reason: HOSPADM

## 2018-10-31 RX ORDER — FLUTICASONE FUROATE AND VILANTEROL 200; 25 UG/1; UG/1
1 POWDER RESPIRATORY (INHALATION) DAILY
Status: DISCONTINUED | OUTPATIENT
Start: 2018-10-31 | End: 2018-11-07 | Stop reason: HOSPADM

## 2018-10-31 RX ORDER — CALCIUM CARBONATE 200(500)MG
1000 TABLET,CHEWABLE ORAL DAILY PRN
Status: DISCONTINUED | OUTPATIENT
Start: 2018-10-31 | End: 2018-11-07 | Stop reason: HOSPADM

## 2018-10-31 RX ORDER — ERGOCALCIFEROL 1.25 MG/1
50000 CAPSULE ORAL 2 TIMES WEEKLY
Status: DISCONTINUED | OUTPATIENT
Start: 2018-11-01 | End: 2018-11-07 | Stop reason: HOSPADM

## 2018-10-31 RX ORDER — ATORVASTATIN CALCIUM 20 MG/1
20 TABLET, FILM COATED ORAL
Status: DISCONTINUED | OUTPATIENT
Start: 2018-10-31 | End: 2018-11-07 | Stop reason: HOSPADM

## 2018-10-31 RX ORDER — BENZONATATE 100 MG/1
100 CAPSULE ORAL 3 TIMES DAILY
Status: DISCONTINUED | OUTPATIENT
Start: 2018-10-31 | End: 2018-11-03

## 2018-10-31 RX ORDER — PANTOPRAZOLE SODIUM 40 MG/1
40 TABLET, DELAYED RELEASE ORAL
Status: DISCONTINUED | OUTPATIENT
Start: 2018-10-31 | End: 2018-11-07 | Stop reason: HOSPADM

## 2018-10-31 RX ORDER — HYDROCODONE BITARTRATE AND ACETAMINOPHEN 5; 325 MG/1; MG/1
1 TABLET ORAL EVERY 6 HOURS PRN
Status: DISCONTINUED | OUTPATIENT
Start: 2018-10-31 | End: 2018-11-07 | Stop reason: HOSPADM

## 2018-10-31 RX ORDER — METHYLPREDNISOLONE SODIUM SUCCINATE 125 MG/2ML
125 INJECTION, POWDER, LYOPHILIZED, FOR SOLUTION INTRAMUSCULAR; INTRAVENOUS ONCE
Status: COMPLETED | OUTPATIENT
Start: 2018-10-31 | End: 2018-10-31

## 2018-10-31 RX ORDER — ACETAMINOPHEN 325 MG/1
650 TABLET ORAL EVERY 6 HOURS PRN
Status: DISCONTINUED | OUTPATIENT
Start: 2018-10-31 | End: 2018-11-07 | Stop reason: HOSPADM

## 2018-10-31 RX ORDER — MIRTAZAPINE 15 MG/1
45 TABLET, FILM COATED ORAL
Status: DISCONTINUED | OUTPATIENT
Start: 2018-10-31 | End: 2018-11-07 | Stop reason: HOSPADM

## 2018-10-31 RX ORDER — VENLAFAXINE HYDROCHLORIDE 150 MG/1
150 CAPSULE, EXTENDED RELEASE ORAL EVERY MORNING
Status: DISCONTINUED | OUTPATIENT
Start: 2018-10-31 | End: 2018-11-07 | Stop reason: HOSPADM

## 2018-10-31 RX ORDER — LISINOPRIL 20 MG/1
20 TABLET ORAL DAILY
Status: DISCONTINUED | OUTPATIENT
Start: 2018-10-31 | End: 2018-11-07 | Stop reason: HOSPADM

## 2018-10-31 RX ORDER — ACETAMINOPHEN 325 MG/1
650 TABLET ORAL ONCE
Status: COMPLETED | OUTPATIENT
Start: 2018-10-31 | End: 2018-10-31

## 2018-10-31 RX ORDER — IPRATROPIUM BROMIDE AND ALBUTEROL SULFATE 2.5; .5 MG/3ML; MG/3ML
3 SOLUTION RESPIRATORY (INHALATION)
Status: DISCONTINUED | OUTPATIENT
Start: 2018-10-31 | End: 2018-10-31

## 2018-10-31 RX ORDER — AZITHROMYCIN 250 MG/1
500 TABLET, FILM COATED ORAL EVERY 24 HOURS
Status: DISCONTINUED | OUTPATIENT
Start: 2018-10-31 | End: 2018-11-02

## 2018-10-31 RX ORDER — HYDROCODONE POLISTIREX AND CHLORPHENIRAMINE POLISTIREX 10; 8 MG/5ML; MG/5ML
5 SUSPENSION, EXTENDED RELEASE ORAL EVERY 12 HOURS PRN
Status: DISCONTINUED | OUTPATIENT
Start: 2018-10-31 | End: 2018-11-07 | Stop reason: HOSPADM

## 2018-10-31 RX ORDER — ASPIRIN 81 MG/1
81 TABLET, CHEWABLE ORAL EVERY MORNING
Status: DISCONTINUED | OUTPATIENT
Start: 2018-10-31 | End: 2018-11-07 | Stop reason: HOSPADM

## 2018-10-31 RX ORDER — AMLODIPINE BESYLATE 5 MG/1
5 TABLET ORAL EVERY 12 HOURS SCHEDULED
Status: DISCONTINUED | OUTPATIENT
Start: 2018-10-31 | End: 2018-11-07 | Stop reason: HOSPADM

## 2018-10-31 RX ORDER — METHYLPREDNISOLONE SODIUM SUCCINATE 40 MG/ML
40 INJECTION, POWDER, LYOPHILIZED, FOR SOLUTION INTRAMUSCULAR; INTRAVENOUS EVERY 8 HOURS
Status: DISCONTINUED | OUTPATIENT
Start: 2018-10-31 | End: 2018-11-01

## 2018-10-31 RX ADMIN — HYDROCODONE POLISTIREX AND CHLORPHENIRAMINE POLISTIREX 5 ML: 10; 8 SUSPENSION, EXTENDED RELEASE ORAL at 13:46

## 2018-10-31 RX ADMIN — ATORVASTATIN CALCIUM 20 MG: 20 TABLET, FILM COATED ORAL at 16:06

## 2018-10-31 RX ADMIN — LISINOPRIL 20 MG: 20 TABLET ORAL at 13:49

## 2018-10-31 RX ADMIN — LEVALBUTEROL HYDROCHLORIDE 1.25 MG: 1.25 SOLUTION, CONCENTRATE RESPIRATORY (INHALATION) at 23:15

## 2018-10-31 RX ADMIN — PIPERACILLIN SODIUM,TAZOBACTAM SODIUM 3.38 G: 3; .375 INJECTION, POWDER, FOR SOLUTION INTRAVENOUS at 10:39

## 2018-10-31 RX ADMIN — IBUPROFEN 400 MG: 400 TABLET ORAL at 13:46

## 2018-10-31 RX ADMIN — VENLAFAXINE HYDROCHLORIDE 150 MG: 150 CAPSULE, EXTENDED RELEASE ORAL at 14:57

## 2018-10-31 RX ADMIN — INSULIN GLARGINE 60 UNITS: 100 INJECTION, SOLUTION SUBCUTANEOUS at 22:03

## 2018-10-31 RX ADMIN — INSULIN LISPRO 10 UNITS: 100 INJECTION, SOLUTION INTRAVENOUS; SUBCUTANEOUS at 17:46

## 2018-10-31 RX ADMIN — AMLODIPINE BESYLATE 5 MG: 5 TABLET ORAL at 22:00

## 2018-10-31 RX ADMIN — PREGABALIN 50 MG: 50 CAPSULE ORAL at 16:06

## 2018-10-31 RX ADMIN — BENZONATATE 100 MG: 100 CAPSULE ORAL at 22:01

## 2018-10-31 RX ADMIN — IPRATROPIUM BROMIDE AND ALBUTEROL SULFATE 3 ML: .5; 3 SOLUTION RESPIRATORY (INHALATION) at 14:18

## 2018-10-31 RX ADMIN — PREGABALIN 50 MG: 50 CAPSULE ORAL at 22:00

## 2018-10-31 RX ADMIN — METHYLPREDNISOLONE SODIUM SUCCINATE 125 MG: 125 INJECTION, POWDER, FOR SOLUTION INTRAMUSCULAR; INTRAVENOUS at 11:32

## 2018-10-31 RX ADMIN — BUSPIRONE HYDROCHLORIDE 15 MG: 10 TABLET ORAL at 17:40

## 2018-10-31 RX ADMIN — ACETAMINOPHEN 650 MG: 325 TABLET, FILM COATED ORAL at 09:48

## 2018-10-31 RX ADMIN — INSULIN LISPRO 4 UNITS: 100 INJECTION, SOLUTION INTRAVENOUS; SUBCUTANEOUS at 22:07

## 2018-10-31 RX ADMIN — IPRATROPIUM BROMIDE 0.5 MG: 0.5 SOLUTION RESPIRATORY (INHALATION) at 23:15

## 2018-10-31 RX ADMIN — ALPRAZOLAM 2 MG: 0.5 TABLET ORAL at 22:36

## 2018-10-31 RX ADMIN — AZITHROMYCIN 500 MG: 250 TABLET, FILM COATED ORAL at 16:06

## 2018-10-31 RX ADMIN — QUETIAPINE FUMARATE 400 MG: 200 TABLET, EXTENDED RELEASE ORAL at 21:56

## 2018-10-31 RX ADMIN — AMLODIPINE BESYLATE 5 MG: 5 TABLET ORAL at 13:49

## 2018-10-31 RX ADMIN — METOPROLOL TARTRATE 25 MG: 25 TABLET ORAL at 13:48

## 2018-10-31 RX ADMIN — CEFTRIAXONE 2000 MG: 2 INJECTION, SOLUTION INTRAVENOUS at 14:52

## 2018-10-31 RX ADMIN — HYDROCODONE BITARTRATE AND ACETAMINOPHEN 1 TABLET: 5; 325 TABLET ORAL at 18:46

## 2018-10-31 RX ADMIN — BUSPIRONE HYDROCHLORIDE 15 MG: 10 TABLET ORAL at 14:37

## 2018-10-31 RX ADMIN — ENOXAPARIN SODIUM 40 MG: 40 INJECTION SUBCUTANEOUS at 14:36

## 2018-10-31 RX ADMIN — MIRTAZAPINE 45 MG: 15 TABLET, FILM COATED ORAL at 21:57

## 2018-10-31 RX ADMIN — IPRATROPIUM BROMIDE 0.5 MG: 0.5 SOLUTION RESPIRATORY (INHALATION) at 19:36

## 2018-10-31 RX ADMIN — ASPIRIN 81 MG 81 MG: 81 TABLET ORAL at 13:49

## 2018-10-31 RX ADMIN — FLUTICASONE FUROATE AND VILANTEROL TRIFENATATE 1 PUFF: 200; 25 POWDER RESPIRATORY (INHALATION) at 14:57

## 2018-10-31 RX ADMIN — PANTOPRAZOLE SODIUM 40 MG: 40 TABLET, DELAYED RELEASE ORAL at 13:48

## 2018-10-31 RX ADMIN — GUAIFENESIN 600 MG: 600 TABLET, EXTENDED RELEASE ORAL at 21:57

## 2018-10-31 RX ADMIN — BENZONATATE 100 MG: 100 CAPSULE ORAL at 16:06

## 2018-10-31 RX ADMIN — IPRATROPIUM BROMIDE AND ALBUTEROL SULFATE 3 ML: .5; 3 SOLUTION RESPIRATORY (INHALATION) at 09:32

## 2018-10-31 RX ADMIN — LEVALBUTEROL HYDROCHLORIDE 1.25 MG: 1.25 SOLUTION, CONCENTRATE RESPIRATORY (INHALATION) at 19:36

## 2018-10-31 RX ADMIN — LEVALBUTEROL HYDROCHLORIDE 0.63 MG: 0.63 SOLUTION RESPIRATORY (INHALATION) at 16:37

## 2018-10-31 RX ADMIN — IOHEXOL 100 ML: 350 INJECTION, SOLUTION INTRAVENOUS at 10:39

## 2018-10-31 RX ADMIN — METOPROLOL TARTRATE 25 MG: 25 TABLET ORAL at 21:58

## 2018-10-31 RX ADMIN — INSULIN LISPRO 5 UNITS: 100 INJECTION, SOLUTION INTRAVENOUS; SUBCUTANEOUS at 17:46

## 2018-10-31 RX ADMIN — METHYLPREDNISOLONE SODIUM SUCCINATE 40 MG: 40 INJECTION, POWDER, FOR SOLUTION INTRAMUSCULAR; INTRAVENOUS at 18:36

## 2018-10-31 RX ADMIN — KETOROLAC TROMETHAMINE 15 MG: 30 INJECTION, SOLUTION INTRAMUSCULAR at 16:27

## 2018-10-31 RX ADMIN — HYDROMORPHONE HYDROCHLORIDE 1 MG: 1 INJECTION, SOLUTION INTRAMUSCULAR; INTRAVENOUS; SUBCUTANEOUS at 10:08
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
## 2018-10-31 NOTE — ASSESSMENT & PLAN NOTE
· Patient presenting with SOB, cough, hypoxia  · Hx severe restrictive lung disease with FEV1 of 2 1L or 41% Predicted  · Admit to inpatient  · Received loading dose of IV solumedrol in ED, con IV solumedrol 40mg q8hr  · Place on atrovent/xopenex neb q4 and q2 PRN  · Cough suppression with tessalon perles and tussionex  · Pulmonary consult, recs appreciated

## 2018-10-31 NOTE — RESPIRATORY THERAPY NOTE
pt given copd book and education for s/sof copd exacerbation, zone tool, pulmonary rehab, nebulizer treatment, mdi, rescue vs maintenance inhalers and proper inhaler technique

## 2018-10-31 NOTE — PLAN OF CARE
Problem: RESPIRATORY - ADULT  Goal: Achieves optimal ventilation and oxygenation  INTERVENTIONS:  - Assess for changes in respiratory status  - Assess for changes in mentation and behavior  - Position to facilitate oxygenation and minimize respiratory effort  - Oxygen administration by appropriate delivery method based on oxygen saturation (per order) or ABGs  - Initiate smoking cessation education as indicated    Neb/cpap/02  - Encourage broncho-pulmonary hygiene including cough, deep breathe, Incentive Spirometry  - Assess the need for suctioning and aspirate as needed  - Assess and instruct to report SOB or any respiratory difficulty  - Respiratory Therapy support as indicated  Outcome: Progressing
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
## 2018-10-31 NOTE — ASSESSMENT & PLAN NOTE
Lab Results   Component Value Date    HGBA1C 7 6 (H) 10/29/2018       No results for input(s): POCGLU in the last 72 hours      Blood Sugar Average: Last 72 hrs:     Cont home lantus  Hold PO meds  Place on ISS, expect hyperglycemia given steroids

## 2018-10-31 NOTE — ASSESSMENT & PLAN NOTE
· Pt presenting with cough and subjective fevers  · CXR showed NAD, CT chest negative for PNA  · Start Zithro/rocephin  · Check respiratory pathogen panel  · Check serial procalcitonins  · Treat COPD exac as noted

## 2018-10-31 NOTE — ASSESSMENT & PLAN NOTE
· Hx COPD on 2L NC at night  · Admitted with hypoxia, requiring NC   From COPD exac, bronchitis  · Treat as noted  · NC for O2 sats >92%, wean as tolerated

## 2018-10-31 NOTE — ASSESSMENT & PLAN NOTE
· A/e/b leukocytosis, tachycardia, tachypnea     · CXR showed NAD  · CTA PE negative for PE or PNA  · LA elevated at 2 8 on admission  · Monitor LA until <2  · Workup including blood cx, resp pathogen panel, sputum cx pending  · Check serial procalcitonins

## 2018-11-01 ENCOUNTER — APPOINTMENT (INPATIENT)
Dept: RADIOLOGY | Facility: HOSPITAL | Age: 58
DRG: 871 | End: 2018-11-01
Payer: MEDICARE

## 2018-11-01 PROBLEM — J18.9 PNEUMONIA: Status: ACTIVE | Noted: 2017-03-20

## 2018-11-01 LAB
ANION GAP SERPL CALCULATED.3IONS-SCNC: 14 MMOL/L (ref 4–13)
ATRIAL RATE: 112 BPM
BASOPHILS # BLD AUTO: 0.03 THOUSANDS/ΜL (ref 0–0.1)
BASOPHILS NFR BLD AUTO: 0 % (ref 0–1)
BUN SERPL-MCNC: 35 MG/DL (ref 5–25)
CALCIUM SERPL-MCNC: 8.3 MG/DL (ref 8.3–10.1)
CHLORIDE SERPL-SCNC: 99 MMOL/L (ref 100–108)
CO2 SERPL-SCNC: 22 MMOL/L (ref 21–32)
CREAT SERPL-MCNC: 1.09 MG/DL (ref 0.6–1.3)
EOSINOPHIL # BLD AUTO: 0 THOUSAND/ΜL (ref 0–0.61)
EOSINOPHIL NFR BLD AUTO: 0 % (ref 0–6)
ERYTHROCYTE [DISTWIDTH] IN BLOOD BY AUTOMATED COUNT: 12.8 % (ref 11.6–15.1)
FLUAV AG SPEC QL: NORMAL
FLUBV AG SPEC QL: NORMAL
GFR SERPL CREATININE-BSD FRML MDRD: 74 ML/MIN/1.73SQ M
GLUCOSE SERPL-MCNC: 214 MG/DL (ref 65–140)
GLUCOSE SERPL-MCNC: 307 MG/DL (ref 65–140)
GLUCOSE SERPL-MCNC: 318 MG/DL (ref 65–140)
GLUCOSE SERPL-MCNC: 326 MG/DL (ref 65–140)
GLUCOSE SERPL-MCNC: 441 MG/DL (ref 65–140)
HCT VFR BLD AUTO: 43.7 % (ref 36.5–49.3)
HGB BLD-MCNC: 14.3 G/DL (ref 12–17)
IMM GRANULOCYTES # BLD AUTO: 0.14 THOUSAND/UL (ref 0–0.2)
IMM GRANULOCYTES NFR BLD AUTO: 1 % (ref 0–2)
LACTATE SERPL-SCNC: 1.8 MMOL/L (ref 0.5–2)
LACTATE SERPL-SCNC: 2.7 MMOL/L (ref 0.5–2)
LYMPHOCYTES # BLD AUTO: 3.85 THOUSANDS/ΜL (ref 0.6–4.47)
LYMPHOCYTES NFR BLD AUTO: 26 % (ref 14–44)
MCH RBC QN AUTO: 31.2 PG (ref 26.8–34.3)
MCHC RBC AUTO-ENTMCNC: 32.7 G/DL (ref 31.4–37.4)
MCV RBC AUTO: 95 FL (ref 82–98)
MONOCYTES # BLD AUTO: 0.56 THOUSAND/ΜL (ref 0.17–1.22)
MONOCYTES NFR BLD AUTO: 4 % (ref 4–12)
NEUTROPHILS # BLD AUTO: 10.26 THOUSANDS/ΜL (ref 1.85–7.62)
NEUTS SEG NFR BLD AUTO: 69 % (ref 43–75)
NRBC BLD AUTO-RTO: 0 /100 WBCS
P AXIS: 39 DEGREES
PLATELET # BLD AUTO: 202 THOUSANDS/UL (ref 149–390)
PMV BLD AUTO: 9.8 FL (ref 8.9–12.7)
POTASSIUM SERPL-SCNC: 4.5 MMOL/L (ref 3.5–5.3)
PROCALCITONIN SERPL-MCNC: 0.16 NG/ML
QRS AXIS: 67 DEGREES
QRSD INTERVAL: 88 MS
QT INTERVAL: 314 MS
QTC INTERVAL: 428 MS
RBC # BLD AUTO: 4.58 MILLION/UL (ref 3.88–5.62)
RSV B RNA SPEC QL NAA+PROBE: NORMAL
SODIUM SERPL-SCNC: 135 MMOL/L (ref 136–145)
T WAVE AXIS: 48 DEGREES
VENTRICULAR RATE: 112 BPM
WBC # BLD AUTO: 14.84 THOUSAND/UL (ref 4.31–10.16)

## 2018-11-01 PROCEDURE — 82948 REAGENT STRIP/BLOOD GLUCOSE: CPT

## 2018-11-01 PROCEDURE — 84145 PROCALCITONIN (PCT): CPT | Performed by: STUDENT IN AN ORGANIZED HEALTH CARE EDUCATION/TRAINING PROGRAM

## 2018-11-01 PROCEDURE — 99232 SBSQ HOSP IP/OBS MODERATE 35: CPT | Performed by: INTERNAL MEDICINE

## 2018-11-01 PROCEDURE — 94640 AIRWAY INHALATION TREATMENT: CPT

## 2018-11-01 PROCEDURE — 99232 SBSQ HOSP IP/OBS MODERATE 35: CPT | Performed by: STUDENT IN AN ORGANIZED HEALTH CARE EDUCATION/TRAINING PROGRAM

## 2018-11-01 PROCEDURE — 94660 CPAP INITIATION&MGMT: CPT

## 2018-11-01 PROCEDURE — 94760 N-INVAS EAR/PLS OXIMETRY 1: CPT

## 2018-11-01 PROCEDURE — 94668 MNPJ CHEST WALL SBSQ: CPT

## 2018-11-01 PROCEDURE — 83605 ASSAY OF LACTIC ACID: CPT | Performed by: STUDENT IN AN ORGANIZED HEALTH CARE EDUCATION/TRAINING PROGRAM

## 2018-11-01 PROCEDURE — 85025 COMPLETE CBC W/AUTO DIFF WBC: CPT | Performed by: STUDENT IN AN ORGANIZED HEALTH CARE EDUCATION/TRAINING PROGRAM

## 2018-11-01 PROCEDURE — 71045 X-RAY EXAM CHEST 1 VIEW: CPT

## 2018-11-01 PROCEDURE — 93010 ELECTROCARDIOGRAM REPORT: CPT | Performed by: INTERNAL MEDICINE

## 2018-11-01 PROCEDURE — 80048 BASIC METABOLIC PNL TOTAL CA: CPT | Performed by: STUDENT IN AN ORGANIZED HEALTH CARE EDUCATION/TRAINING PROGRAM

## 2018-11-01 RX ORDER — METHYLPREDNISOLONE SODIUM SUCCINATE 40 MG/ML
40 INJECTION, POWDER, LYOPHILIZED, FOR SOLUTION INTRAMUSCULAR; INTRAVENOUS EVERY 12 HOURS SCHEDULED
Status: DISCONTINUED | OUTPATIENT
Start: 2018-11-01 | End: 2018-11-02

## 2018-11-01 RX ADMIN — HYDROCODONE BITARTRATE AND ACETAMINOPHEN 1 TABLET: 5; 325 TABLET ORAL at 22:09

## 2018-11-01 RX ADMIN — BENZONATATE 100 MG: 100 CAPSULE ORAL at 09:26

## 2018-11-01 RX ADMIN — BUSPIRONE HYDROCHLORIDE 15 MG: 10 TABLET ORAL at 17:13

## 2018-11-01 RX ADMIN — BENZONATATE 100 MG: 100 CAPSULE ORAL at 16:33

## 2018-11-01 RX ADMIN — IPRATROPIUM BROMIDE 0.5 MG: 0.5 SOLUTION RESPIRATORY (INHALATION) at 07:42

## 2018-11-01 RX ADMIN — IPRATROPIUM BROMIDE 0.5 MG: 0.5 SOLUTION RESPIRATORY (INHALATION) at 11:38

## 2018-11-01 RX ADMIN — MIRTAZAPINE 45 MG: 15 TABLET, FILM COATED ORAL at 21:39

## 2018-11-01 RX ADMIN — IPRATROPIUM BROMIDE 0.5 MG: 0.5 SOLUTION RESPIRATORY (INHALATION) at 23:40

## 2018-11-01 RX ADMIN — FLUTICASONE FUROATE AND VILANTEROL TRIFENATATE 1 PUFF: 200; 25 POWDER RESPIRATORY (INHALATION) at 09:26

## 2018-11-01 RX ADMIN — LEVALBUTEROL HYDROCHLORIDE 1.25 MG: 1.25 SOLUTION, CONCENTRATE RESPIRATORY (INHALATION) at 19:34

## 2018-11-01 RX ADMIN — AMLODIPINE BESYLATE 5 MG: 5 TABLET ORAL at 21:36

## 2018-11-01 RX ADMIN — LEVALBUTEROL HYDROCHLORIDE 1.25 MG: 1.25 SOLUTION, CONCENTRATE RESPIRATORY (INHALATION) at 07:42

## 2018-11-01 RX ADMIN — PREGABALIN 50 MG: 50 CAPSULE ORAL at 16:32

## 2018-11-01 RX ADMIN — METHYLPREDNISOLONE SODIUM SUCCINATE 40 MG: 40 INJECTION, POWDER, FOR SOLUTION INTRAMUSCULAR; INTRAVENOUS at 02:31

## 2018-11-01 RX ADMIN — LEVALBUTEROL HYDROCHLORIDE 1.25 MG: 1.25 SOLUTION, CONCENTRATE RESPIRATORY (INHALATION) at 15:04

## 2018-11-01 RX ADMIN — LISINOPRIL 20 MG: 20 TABLET ORAL at 09:26

## 2018-11-01 RX ADMIN — IPRATROPIUM BROMIDE 0.5 MG: 0.5 SOLUTION RESPIRATORY (INHALATION) at 15:04

## 2018-11-01 RX ADMIN — HYDROCODONE POLISTIREX AND CHLORPHENIRAMINE POLISTIREX 5 ML: 10; 8 SUSPENSION, EXTENDED RELEASE ORAL at 02:15

## 2018-11-01 RX ADMIN — IBUPROFEN 400 MG: 400 TABLET ORAL at 10:27

## 2018-11-01 RX ADMIN — QUETIAPINE FUMARATE 400 MG: 200 TABLET, EXTENDED RELEASE ORAL at 21:39

## 2018-11-01 RX ADMIN — BUSPIRONE HYDROCHLORIDE 15 MG: 10 TABLET ORAL at 09:26

## 2018-11-01 RX ADMIN — HYDROCODONE BITARTRATE AND ACETAMINOPHEN 1 TABLET: 5; 325 TABLET ORAL at 14:40

## 2018-11-01 RX ADMIN — BENZONATATE 100 MG: 100 CAPSULE ORAL at 21:36

## 2018-11-01 RX ADMIN — PANTOPRAZOLE SODIUM 40 MG: 40 TABLET, DELAYED RELEASE ORAL at 06:02

## 2018-11-01 RX ADMIN — ALPRAZOLAM 2 MG: 0.5 TABLET ORAL at 21:40

## 2018-11-01 RX ADMIN — ERGOCALCIFEROL 50000 UNITS: 1.25 CAPSULE ORAL at 09:25

## 2018-11-01 RX ADMIN — ASPIRIN 81 MG 81 MG: 81 TABLET ORAL at 09:26

## 2018-11-01 RX ADMIN — INSULIN LISPRO 6 UNITS: 100 INJECTION, SOLUTION INTRAVENOUS; SUBCUTANEOUS at 11:48

## 2018-11-01 RX ADMIN — LEVALBUTEROL HYDROCHLORIDE 1.25 MG: 1.25 SOLUTION, CONCENTRATE RESPIRATORY (INHALATION) at 11:38

## 2018-11-01 RX ADMIN — PREGABALIN 50 MG: 50 CAPSULE ORAL at 09:26

## 2018-11-01 RX ADMIN — AMLODIPINE BESYLATE 5 MG: 5 TABLET ORAL at 09:26

## 2018-11-01 RX ADMIN — HYDROCODONE BITARTRATE AND ACETAMINOPHEN 1 TABLET: 5; 325 TABLET ORAL at 07:00

## 2018-11-01 RX ADMIN — INSULIN LISPRO 5 UNITS: 100 INJECTION, SOLUTION INTRAVENOUS; SUBCUTANEOUS at 09:23

## 2018-11-01 RX ADMIN — GUAIFENESIN 600 MG: 600 TABLET, EXTENDED RELEASE ORAL at 09:25

## 2018-11-01 RX ADMIN — IBUPROFEN 400 MG: 400 TABLET ORAL at 20:52

## 2018-11-01 RX ADMIN — AZITHROMYCIN 500 MG: 250 TABLET, FILM COATED ORAL at 14:39

## 2018-11-01 RX ADMIN — ATORVASTATIN CALCIUM 20 MG: 20 TABLET, FILM COATED ORAL at 16:33

## 2018-11-01 RX ADMIN — ENOXAPARIN SODIUM 40 MG: 40 INJECTION SUBCUTANEOUS at 09:25

## 2018-11-01 RX ADMIN — IPRATROPIUM BROMIDE 0.5 MG: 0.5 SOLUTION RESPIRATORY (INHALATION) at 03:00

## 2018-11-01 RX ADMIN — INSULIN LISPRO 2 UNITS: 100 INJECTION, SOLUTION INTRAVENOUS; SUBCUTANEOUS at 21:47

## 2018-11-01 RX ADMIN — LEVALBUTEROL HYDROCHLORIDE 1.25 MG: 1.25 SOLUTION, CONCENTRATE RESPIRATORY (INHALATION) at 03:01

## 2018-11-01 RX ADMIN — METHYLPREDNISOLONE SODIUM SUCCINATE 40 MG: 40 INJECTION, POWDER, FOR SOLUTION INTRAMUSCULAR; INTRAVENOUS at 21:42

## 2018-11-01 RX ADMIN — IPRATROPIUM BROMIDE 0.5 MG: 0.5 SOLUTION RESPIRATORY (INHALATION) at 19:34

## 2018-11-01 RX ADMIN — METOPROLOL TARTRATE 25 MG: 25 TABLET ORAL at 09:26

## 2018-11-01 RX ADMIN — VENLAFAXINE HYDROCHLORIDE 150 MG: 150 CAPSULE, EXTENDED RELEASE ORAL at 09:25

## 2018-11-01 RX ADMIN — CEFTRIAXONE 2000 MG: 2 INJECTION, SOLUTION INTRAVENOUS at 14:59

## 2018-11-01 RX ADMIN — INSULIN GLARGINE 60 UNITS: 100 INJECTION, SOLUTION SUBCUTANEOUS at 21:48

## 2018-11-01 RX ADMIN — METOPROLOL TARTRATE 25 MG: 25 TABLET ORAL at 21:37

## 2018-11-01 RX ADMIN — PREGABALIN 50 MG: 50 CAPSULE ORAL at 21:39

## 2018-11-01 RX ADMIN — INSULIN LISPRO 12 UNITS: 100 INJECTION, SOLUTION INTRAVENOUS; SUBCUTANEOUS at 16:33

## 2018-11-01 RX ADMIN — LEVALBUTEROL HYDROCHLORIDE 1.25 MG: 1.25 SOLUTION, CONCENTRATE RESPIRATORY (INHALATION) at 23:40

## 2018-11-01 RX ADMIN — HYDROCODONE POLISTIREX AND CHLORPHENIRAMINE POLISTIREX 5 ML: 10; 8 SUSPENSION, EXTENDED RELEASE ORAL at 16:32

## 2018-11-01 NOTE — ASSESSMENT & PLAN NOTE
· Pt presenting with cough and subjective fevers  CAP vs viral PNA with atelectasis   · Initial CXR showed NAD, CTA chest negative for PNA  · Repeat CXR showed Bibasilar infiltrates and/or atelectasis    · on Zithro/rocephin, day 2  · FLu/RSV PCR negative  · respiratory pathogen panel pending  · serial pro calcitonins 0 10 -> 0 16

## 2018-11-01 NOTE — PROGRESS NOTES
Progress Note - Pulmonary   Gilda Every 60 y o  male MRN: [id number]  Unit/Bed#: 2 [place] [address] Encounter: [id number]      Assessment/Plan:   -acute on chronic hypoxemic respiratory insufficiency on 2 L HS at home:  -2/2 PNA, likely viral > pending resp pathogen profile > flu and rsv neg   -f/u today procal > if < 0 25 will d/c abx  -supportive care  -will increase 02 today due to 89% on 3 5 L despite feeling better  -f/u cxr today   -centrilobular emphysema:  -cont IV steroids for exacerbation of COPD/emphysema,wean dose today  -cont breo / atc nebs, tessalon, cont mucinex > feeling better today   -severe restrictive lung disease with FEV1 of 2 1L or 41% Predicted:  -2/2 morbid obesity w/ BMI 40 45  -requires weight loss for lung improvement  -Mild SHAVONNE:  -cpap HS 9 cm H20  -wears 2 L HS > will need ambulation study prior to d/c > suspect has higher oxygen requirements  -JOVAN and R:U,M,L Lobe Patchy Infiltrates:  -CAP v  Viral PNA w/ likely confounding atelectatic component  -cont zithromax / ceftriaxone > trend procal/ f/u sputum / flu / viral PCR, blood cultures  -Pleuritic Chest Pain:  -improved w/ toradol  -can use sparingly          ______________________________________________________________________    Subjective: Pt seen and examined at bedside  Somewhat improved pleuritic pain today  Less SOB  More hypoxic    Tele Events:     Vitals:   Temp:  [97 7 °F (36 5 °C)-98 2 °F (36 8 °C)] 98 2 °F (36 8 °C)  HR:  [] 80  Resp:  [18-29] 18  BP: (114-174)/() 143/69  FiO2 (%):  [35] 35  Weight (last 2 days)     Date/Time   Weight    10/31/18 0854  132 (290)            Oxygen Therapy  SpO2: 91 %  O2 Flow Rate (L/min): 4 L/min        IV Infusions:       Nutrition:        Diet Orders            Start     Ordered    10/31/18 1259  Diet Campos/CHO Controlled; Consistent Carbohydrate Diet Level 2 (5 carb servings/75 grams CHO/meal)  Diet effective now     Question Answer Comment   Diet Type Campos/CHO Controlled Campos/CHO Controlled Consistent Carbohydrate Diet Level 2 (5 carb servings/75 grams CHO/meal)    RD to adjust diet per protocol?  Yes        10/31/18 1258    10/31/18 1227  Room Service  Once     Question:  Type of Service  Answer:  Room Service-Appropriate    10/31/18 1227          Ins/Outs:   I/O       10/30 0701 - 10/31 0700 10/31 0701 - 11/01 0700 11/01 0701 - 11/02 0700    I V  (mL/kg)   20 (0 2)    IV Piggyback  50     Total Intake(mL/kg)  50 (0 4) 20 (0 2)    Net   +50 +20           Unmeasured Stool Occurrence  1 x           Lines/Drains:  Invasive Devices     Peripheral Intravenous Line            Peripheral IV 10/31/18 Left Hand less than 1 day    Peripheral IV 10/31/18 Right Antecubital less than 1 day                 Active medications:  Scheduled Meds:  Current Facility-Administered Medications:  acetaminophen 650 mg Oral Q6H PRN Fiordaliza Brown MD    ALPRAZolam 2 mg Oral HS PRN Fiordaliza Brown MD    amLODIPine 5 mg Oral Q12H 3630 Lewis Paul MD    aspirin 81 mg Oral QAM Fiordaliza Brown MD    atorvastatin 20 mg Oral Daily With Alin Martino MD    azithromycin 500 mg Oral Q24H Fiordaliza Brown MD    benzonatate 100 mg Oral TID Fiordaliza Brown MD    busPIRone 15 mg Oral BID Fiordaliza Brown MD    calcium carbonate 1,000 mg Oral Daily PRN Fiordaliza Brown MD    cefTRIAXone 2,000 mg Intravenous Q24H Fiordaliza Brown MD Last Rate: 2,000 mg (10/31/18 1452)   Dapagliflozin Propanediol 10 mg Oral QAM Fiordaliza Brown MD    enoxaparin 40 mg Subcutaneous Daily Fiordaliza Brown MD    ergocalciferol 50,000 Units Oral Once per day on Mon Thu Fiordaliza Brown MD    fluticasone-vilanterol 1 puff Inhalation Daily Fiordaliza Brown MD    guaiFENesin 600 mg Oral Q12H [address] Angelicaylyaquiles Melendez PA-C    HYDROcodone-acetaminophen 1 tablet Oral Q6H PRN Gris Moss DO    hydrocodone-chlorpheniramine polistirex 5 mL Oral Q12H PRN Fiordaliza Brown MD    ibuprofen 400 mg Oral Q6H PRN Fiordaliza Brown MD    insulin glargine 60 Units Subcutaneous HS Fiordaliza Brown MD    insulin lispro 1-5 Units Subcutaneous HS Antonio Mark MD    insulin lispro 1-6 Units Subcutaneous TID AC Antonio Mark MD    insulin lispro 12 Units Subcutaneous TID With Meals Antonio Mark MD    ipratropium 0 5 mg Nebulization Q4H Antonio Mark MD    levalbuterol 0 63 mg Nebulization Q2H PRN Antonio Mark MD    levalbuterol 1 25 mg Nebulization Q4H Antonio Mark MD    lisinopril 20 mg Oral Daily Antonio Mark MD    methylPREDNISolone sodium succinate 40 mg Intravenous Dornasim Trevizo MD    metoprolol tartrate 25 mg Oral Q12H 3630 [facility] MD    mirtazapine 45 mg Oral HS Antonio Mark MD    ondansetron 4 mg Intravenous Q6H PRN Antonio Mark MD    pantoprazole 40 mg Oral Early Morning Antonio Mark MD    pregabalin 50 mg Oral TID Antonio Mark MD    QUEtiapine 400 mg Oral HS Antonio Mark MD    venlafaxine 150 mg Oral QAM Antonio Mark MD      PRN Meds:  acetaminophen 650 mg Q6H PRN   ALPRAZolam 2 mg HS PRN   calcium carbonate 1,000 mg Daily PRN   HYDROcodone-acetaminophen 1 tablet Q6H PRN   hydrocodone-chlorpheniramine polistirex 5 mL Q12H PRN   ibuprofen 400 mg Q6H PRN   levalbuterol 0 63 mg Q2H PRN   ondansetron 4 mg Q6H PRN     ____________________________________________________________________      Physical Exam   Constitutional: He is oriented to person, place, and time  He appears well-developed and well-nourished  No distress  General obese body habitus about neck chest abdomen   HENT:   Head: Normocephalic and atraumatic  Eyes: Pupils are equal, round, and reactive to light  No scleral icterus  Neck: Normal range of motion  No tracheal deviation present  Cardiovascular: Normal rate, regular rhythm, normal heart sounds and intact distal pulses  Exam reveals no gallop and no friction rub  No murmur heard  Pulmonary/Chest: No accessory muscle usage or stridor  No tachypnea  He is in respiratory distress  He has decreased breath sounds in the right lower field  He has no wheezes  He has rhonchi in the right lower field  He has no rales   He exhibits no tenderness  Mild conversational dyspnea   Abdominal: Soft  He exhibits distension  There is no tenderness  There is no rebound and no guarding  Baseline protuberant abdomen   Musculoskeletal: Normal range of motion  He exhibits edema  Trace lower extremity edema    Chronic brawny edema and erythema   Neurological: He is alert and oriented to person, place, and time  Skin: Skin is warm and dry     Psychiatric: He has a normal mood and affect            ____________________________________________________________________    Labs:   CBC:   Results from last 7 days  Lab Units 11/01/18  0518 10/31/18  0926   WBC Thousand/uL 14 84* 14 12*   HEMOGLOBIN g/dL 14 3 15 9   HEMATOCRIT % 43 7 47 6   MCV fL 95 94   PLATELETS Thousands/uL 202 203     CMP:   Results from last 7 days  Lab Units 11/01/18  0518 10/31/18  0926 10/29/18  0801   SODIUM mmol/L 135* 137 136   POTASSIUM mmol/L 4 5 4 3 4 0   CHLORIDE mmol/L 99* 100 98*   CO2 mmol/L 22 23 26   BUN mg/dL 35* 24 19   CREATININE mg/dL 1 09 1 10 1 16   CALCIUM mg/dL 8 3 9 9 9 1   AST U/L  --  32 31   ALT U/L  --  55 49   ALK PHOS U/L  --  62 58   EGFR ml/min/1 73sq m 74 73 69     Magnesium:   Results from last 7 days  Lab Units 10/29/18  0801   MAGNESIUM mg/dL 2 1     Phosphorous:   Results from last 7 days  Lab Units 10/29/18  0801   PHOSPHORUS mg/dL 3 6     Troponin:   Results from last 7 days  Lab Units 10/31/18  0926   TROPONIN I ng/mL <0 02     PT/INR:   Results from last 7 days  Lab Units 10/31/18  0926   PTT seconds 23*   INR  0 98     Lactic Acid:   Results from last 7 days  Lab Units 11/01/18  0212 11/01/18  0026   LACTIC ACID mmol/L 1 8 2 7*     BNP:   Results from last 7 days  Lab Units 10/31/18  0926   NT-PRO BNP pg/mL 36     TSH:   Results from last 7 days  Lab Units 10/29/18  0801   TSH 3RD GENERATON uIU/mL 1 090     Procalcitonin: Invalid input(s): PROCALCITONIN      Imaging:   CTA ED chest PE study   Final Result by Chani Clark MD (10/31 2556) No PE                      Workstation performed: [id number]         XR chest 1 view portable   Final Result by Nikky Jack MD (51/13 4079)   Cardiomegaly  Scarring at the right lung base  Workstation performed: [id number]         XR chest portable    (Results Pending)         Micro: Lab Results   Component Value Date    BLOODCX No Growth After 5 Days  09/06/2017    BLOODCX No Growth After 5 Days  09/06/2017    BLOODCX Diphtheroids 03/20/2017    BLOODCX No Growth After 5 Days  03/20/2017    URINECX 1805-1367 cfu/ml Mixed Contaminants X2 03/20/2017    URINECX No Growth <1000 cfu/mL 11/27/2016    URINECX No Growth <1000 cfu/mL 09/02/2016    SPUTUMCULTUR Test not performed  Suggest repeat specimen   11/08/2017    SPUTUMCULTUR 1+ Growth of Staphylococcus aureus 03/20/2017    SPUTUMCULTUR 1+ Growth of Mixed Respiratory Francine 03/20/2017    MRSACULTURE  11/06/2017     No Methicillin Resistant Staphlyococcus aureus (MRSA) isolated        Results from last 7 days  Lab Units 10/31/18  1447   INFLUENZA A PCR  None Detected   INFLUENZA B PCR  None Detected   RSV PCR  None Detected           Code Status: Level 1 - Full Code

## 2018-11-01 NOTE — ASSESSMENT & PLAN NOTE
· Hx COPD on 2L NC at night  · Admitted with hypoxia, requiring NC   From COPD exac, bronchitis  · NC for O2 sats >92%, wean as tolerated  · Will need home O2 eval prior to DC

## 2018-11-01 NOTE — PROGRESS NOTES
Progress Note Montserrat Roberts 1960, 60 y o  male MRN: [id number]    Unit/Bed#: [place] Encounter: [id number]    Primary Care Provider: Rola Ramos MD   Date and time admitted to hospital: 10/31/2018  8:49 AM        * COPD with exacerbation ([place] [address] )   Assessment & Plan    · Patient presenting with SOB, cough, hypoxia  · Hx severe restrictive lung disease with FEV1 of 2 1L or 41% Predicted  · Admit to inpatient  · Received loading dose of IV solumedrol in ED, con IV solumedrol 40mg q8hr  · Place on atrovent/xopenex neb q4 and q2 PRN  · Cough suppression with tessalon perles and tussionex  · Stopped mucinex given painful coughing  · Pulmonary consult, recs appreciated     Acute on chronic respiratory failure with hypoxia ([place] [address] )   Assessment & Plan    · Hx COPD on 2L NC at night  · Admitted with hypoxia, requiring NC  From COPD exac, bronchitis  · NC for O2 sats >92%, wean as tolerated  · Will need home O2 eval prior to DC     Pneumonia   Assessment & Plan    · Pt presenting with cough and subjective fevers  CAP vs viral PNA with atelectasis   · Initial CXR showed NAD, CTA chest negative for PNA  · Repeat CXR showed Bibasilar infiltrates and/or atelectasis  · on Zithro/rocephin, day 2  · FLu/RSV PCR negative  · respiratory pathogen panel pending  · serial pro calcitonins 0 10 -> 0 16       Sepsis ([place] [address] )   Assessment & Plan    · A/e/b leukocytosis, tachycardia, tachypnea     · CXR showed NAD  · CTA PE negative for PE or PNA  · Repeat CXR showed infiltrates, source is PNA  · LA elevated at 2 8, normalized  · Workup as noted above     Benign essential hypertension   Assessment & Plan    · BP initially elevated on admission  · Cont home meds, BP improved     CAD (coronary artery disease)   Assessment & Plan    · Cont home meds: metoprolol, norvasc, ASA, statin     SHAVONNE (obstructive sleep apnea)   Assessment & Plan    · Cont CPAP at [facility]     Diabetes mellitus type 2, uncontrolled ([place] [address] )   Assessment & Plan    Lab Results   Component Value Date    HGBA1C 7 6 (H) 10/29/2018       Recent Labs      10/31/18   1742  10/31/18   2136  18   0655  18   1101   POCGLU  403*  398*  326*  441*       Blood Sugar Average: Last 72 hrs:  (P) 392   Cont home lantus  Hold PO meds  Place on ISS, increase coverage given hyperglycemia from steroids     Bipolar affective disorder (HCC)   Assessment & Plan    · Cont home meds: effexor, seroquel, remeron, cariprazine, buspar, xanax           VTE Pharmacologic Prophylaxis:   Pharmacologic: Enoxaparin (Lovenox)  Mechanical VTE Prophylaxis in Place: Yes    Patient Centered Rounds: I have performed bedside rounds with nursing staff today  Discussions with Specialists or Other Care Team Provider: Yes    Education and Discussions with Family / Patient:Yes    Time Spent for Care: 30 minutes  More than 50% of total time spent on counseling and coordination of care as described above  Current Length of Stay: 1 day(s)    Current Patient Status: Inpatient     Discharge Plan: pending    Code Status: Level 1 - Full Code      Subjective:   Feels a little better since arrival but still has SOB and painful cough when he moves  Pain medications helping   This morning more hypoxic on NC, 89% on 3 5L so O2 was increased to 6L      Objective:       Vitals:   Temp (24hrs), Av °F (36 7 °C), Min:97 7 °F (36 5 °C), Max:98 2 °F (36 8 °C)    Temp:  [97 7 °F (36 5 °C)-98 2 °F (36 8 °C)] 98 2 °F (36 8 °C)  HR:  [] 80  Resp:  [18-29] 18  BP: (114-174)/() 143/69  SpO2:  [87 %-95 %] 91 %  Body mass index is 40 45 kg/m²  Input and Output Summary (last 24 hours): Intake/Output Summary (Last 24 hours) at 18 1002  Last data filed at 18 0701   Gross per 24 hour   Intake               70 ml   Output                0 ml   Net               70 ml       Physical Exam:     Physical Exam   Constitutional: He is oriented to person, place, and time  He appears well-developed     Obese HENT:   Head: Normocephalic and atraumatic  Cardiovascular: Normal rate, regular rhythm and normal heart sounds  No murmur heard  Pulmonary/Chest: He is in respiratory distress  He has no wheezes  He has no rales  Increased work of breathing, conversation dyspnea, +course breath sounds with course rhonchi, decreased BS at the bases  On 6L O2   Abdominal: Soft  Bowel sounds are normal  He exhibits no distension  There is no tenderness  There is no rebound  Musculoskeletal: He exhibits edema (trace)  He exhibits no tenderness or deformity  Neurological: He is alert and oriented to person, place, and time  Skin: Skin is warm and dry  Psychiatric: He has a normal mood and affect  His behavior is normal    Nursing note and vitals reviewed  Additional Data:     Labs:      Results from last 7 days  Lab Units 11/01/18  0518   WBC Thousand/uL 14 84*   HEMOGLOBIN g/dL 14 3   HEMATOCRIT % 43 7   PLATELETS Thousands/uL 202   NEUTROS PCT % 69   LYMPHS PCT % 26   MONOS PCT % 4   EOS PCT % 0       Results from last 7 days  Lab Units 11/01/18  0518 10/31/18  0926   SODIUM mmol/L 135* 137   POTASSIUM mmol/L 4 5 4 3   CHLORIDE mmol/L 99* 100   CO2 mmol/L 22 23   BUN mg/dL 35* 24   CREATININE mg/dL 1 09 1 10   CALCIUM mg/dL 8 3 9 9   ALK PHOS U/L  --  62   ALT U/L  --  55   AST U/L  --  32       Results from last 7 days  Lab Units 10/31/18  0926   INR  0 98       * I Have Reviewed All Lab Data Listed Above  * Additional Pertinent Lab Tests Reviewed: [facility]    Imaging:  Xr Chest Portable    Result Date: 11/1/2018  Narrative: CHEST INDICATION:   progressive hypoxemia  COMPARISON:  10/31/2018 EXAM PERFORMED/VIEWS:  XR CHEST PORTABLE 1 image FINDINGS: Cardiomediastinal silhouette appears enlarged  No evidence of heart failure  Bibasilar infiltrates and/or atelectasis  Cannot exclude small pleural effusions  Osseous structures appear within normal limits for patient age  Impression: Bibasilar infiltrates and/or atelectasis  Workstation performed: [id number]     Xr Chest 1 View Portable    Result Date: 10/31/2018  Narrative: CHEST INDICATION:   SOB  Shortness of breath COMPARISON:  11/06/2017 EXAM PERFORMED/VIEWS:  XR CHEST PORTABLE 1 image FINDINGS: Cardiomediastinal silhouette appears enlarged  The pulmonary vessels are normal  Scarring at the right lung base  No infiltrates  Osseous structures appear within normal limits for patient age  Impression: Cardiomegaly  Scarring at the right lung base  Workstation performed: [id number]     Cta Ed Chest Pe Study    Result Date: 10/31/2018  Narrative: CTA - CHEST WITH IV CONTRAST - PULMONARY ANGIOGRAM INDICATION:   pleuritic chest pain  COMPARISON: None  TECHNIQUE: CTA examination of the chest was performed using angiographic technique according to a protocol specifically tailored to evaluate for pulmonary embolism  Axial, sagittal, and coronal 2D reformatted images were created from the source data and  submitted for interpretation  In addition, coronal 3D MIP postprocessing was performed on the acquisition scanner  Radiation dose length product (DLP) for this visit:  791 61 mGy-cm   This examination, like all CT scans performed in the [facility], was performed utilizing techniques to minimize radiation dose exposure, including the use of iterative  reconstruction and automated exposure control  IV Contrast:  100 mL of iohexol (OMNIPAQUE)  FINDINGS: PULMONARY ARTERIAL TREE:  No pulmonary embolus is seen  LUNGS:  Lungs are clear  There is no tracheal or endobronchial lesion  PLEURA:  Unremarkable  HEART/GREAT VESSELS:  Cardiomegaly evident  MEDIASTINUM AND CHRISTOPHER:  Unremarkable  CHEST WALL AND LOWER NECK:   Unremarkable  VISUALIZED STRUCTURES IN THE UPPER ABDOMEN:  Enlarged fatty liver evident  Status post cholecystectomy  Fatty replaced pancreas   OSSEOUS STRUCTURES:  No acute fracture or destructive osseous lesion  Impression: No PE  Workstation performed: [id number]     Imaging Reports Reviewed by myself    Cultures:   Blood Culture:   Lab Results   Component Value Date    BLOODCX No Growth After 5 Days  09/06/2017    BLOODCX No Growth After 5 Days  09/06/2017    BLOODCX Diphtheroids 03/20/2017    BLOODCX No Growth After 5 Days  03/20/2017    BLOODCX No Growth After 5 Days  01/12/2016    BLOODCX No Growth After 5 Days   01/12/2016     Urine Culture:   Lab Results   Component Value Date    URINECX 7620-0094 cfu/ml Mixed Contaminants X2 03/20/2017    URINECX No Growth <1000 cfu/mL 11/27/2016    URINECX No Growth <1000 cfu/mL 09/02/2016     Sputum Culture: No components found for: SPUTUMCX  Wound Culture: No results found for: WOUNDCULT    Last 24 Hours Medication List:     Current Facility-Administered Medications:  acetaminophen 650 mg Oral Q6H PRN Brianna Le MD    ALPRAZolam 2 mg Oral HS PRN Brianna Le MD    amLODIPine 5 mg Oral Q12H 3630 [facility], MD    aspirin 81 mg Oral QAM Brianna Le MD    atorvastatin 20 mg Oral Daily With Alin Martino MD    azithromycin 500 mg Oral Q24H Brianna Le MD    benzonatate 100 mg Oral TID Brianna Le MD    busPIRone 15 mg Oral BID Brianna Le MD    calcium carbonate 1,000 mg Oral Daily PRN Brianna Le MD    cefTRIAXone 2,000 mg Intravenous Q24H Brianna Le MD Last Rate: 2,000 mg (10/31/18 1452)   Dapagliflozin Propanediol 10 mg Oral QAM Brianna Le MD    enoxaparin 40 mg Subcutaneous Daily Brianna Le MD    ergocalciferol 50,000 Units Oral Once per day on Mon Thu Brianna Le MD    fluticasone-vilanterol 1 puff Inhalation Daily Brianna Le MD    guaiFENesin 600 mg Oral Q12H [address] Delynn Hearing, PA-C    HYDROcodone-acetaminophen 1 tablet Oral Q6H PRN Ritta Lav, DO    hydrocodone-chlorpheniramine polistirex 5 mL Oral Q12H PRN Brianna Le MD    ibuprofen 400 mg Oral Q6H PRN Brianna Le MD    insulin glargine 60 Units Subcutaneous HS Brianna Le MD    insulin lispro 1-5 Units Subcutaneous HS Srinivasa Aquino MD    insulin lispro 1-6 Units Subcutaneous TID AC Srinivasa Aquino MD    insulin lispro 12 Units Subcutaneous TID With Meals Srinivasa Aquino MD    ipratropium 0 5 mg Nebulization Q4H Srinivasa Aquino MD    levalbuterol 0 63 mg Nebulization Q2H PRN Srinivasa Aquino MD    levalbuterol 1 25 mg Nebulization Q4H Srinivasa Aquino MD    lisinopril 20 mg Oral Daily Srinivasa Aquino MD    methylPREDNISolone sodium succinate 40 mg Intravenous Q12H [address] [place]    metoprolol tartrate 25 mg Oral Q12H 3630 [facility], MD    mirtazapine 45 mg Oral HS Srinivasa Aquino MD    ondansetron 4 mg Intravenous Q6H PRN Srinivasa Aquino MD    pantoprazole 40 mg Oral Early Morning Srinivasa Aquino MD    pregabalin 50 mg Oral TID Srinivasa Aquino MD    QUEtiapine 400 mg Oral HS Srinivasa Aquino MD    venlafaxine 150 mg Oral Antoine Prieto MD         Today, Patient Was Seen By: Srinivasa Aquino MD    ** Please Note: Dragon 360 Dictation voice to text software may have been used in the creation of this document   **

## 2018-11-01 NOTE — ASSESSMENT & PLAN NOTE
· Pt presenting with cough and subjective fevers  CAP vs viral PNA with atelectasis   · Initial CXR showed NAD, CTA chest negative for PNA  · Repeat CXR showed Bibasilar infiltrates and/or atelectasis    · FLu/RSV PCR negative  · resp pathogen profile negative  · Cont toradol PRN pleuritic chest pain  · Completed 5 day course of azithromycin  · Continue pain control, incentive spirometer

## 2018-11-01 NOTE — ASSESSMENT & PLAN NOTE
· Patient presenting with SOB, cough, hypoxia  · Hx severe restrictive lung disease with FEV1 of 2 1L or 41% Predicted  · CTA without evidence of pneumonia and PE  · Respiratory pathogen profile been negative  · Seen by Pulmonary, input appreciated  · Treated with frequent bronchodilators, chest PT, IV steroid, received azithromycin  · Required multiple medications for unrelenting cough  · Slowly improved, steroids were gradually tapered off  · Seen and followed closely by Pulmonary  · Clinically improved, shortness of breath and right chest wall significantly better    · Still with dry cough but better with medications  · Evaluated for home oxygen, requiring 2 L with activity and ambulation  · Patient will be discharged home on prednisone taper  · He will be continued on duo nebs Advair and supplemental oxygen  · Advised to continue symptomatic treatment for cough as advised  · Close follow-up with Pulmonary

## 2018-11-01 NOTE — ASSESSMENT & PLAN NOTE
· A/e/b leukocytosis, tachycardia, tachypnea     · CXR showed NAD  · CTA PE negative for PE or PNA  · Repeat CXR showed infiltrates, source is PNA  · LA elevated at 2 8, normalized  · Workup as noted above

## 2018-11-01 NOTE — ASSESSMENT & PLAN NOTE
· Patient presenting with SOB, cough, hypoxia  · Hx severe restrictive lung disease with FEV1 of 2 1L or 41% Predicted  · Admit to inpatient  · Received loading dose of IV solumedrol in ED, con IV solumedrol 40mg q8hr  · Place on atrovent/xopenex neb q4 and q2 PRN  · Cough suppression with tessalon perles and tussionex  · Stopped mucinex given painful coughing  · Pulmonary consult, recs appreciated
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
## 2018-11-01 NOTE — ASSESSMENT & PLAN NOTE
Lab Results   Component Value Date    HGBA1C 7 6 (H) 10/29/2018       Recent Labs      10/31/18   1742  10/31/18   2136  11/01/18   0655  11/01/18   1101   POCGLU  403*  398*  326*  441*       Blood Sugar Average: Last 72 hrs:  (P) 392   Cont home lantus  Hold PO meds  Place on ISS, increase coverage given hyperglycemia from steroids

## 2018-11-02 LAB
ANION GAP SERPL CALCULATED.3IONS-SCNC: 12 MMOL/L (ref 4–13)
BASOPHILS # BLD AUTO: 0.01 THOUSANDS/ΜL (ref 0–0.1)
BASOPHILS NFR BLD AUTO: 0 % (ref 0–1)
BUN SERPL-MCNC: 35 MG/DL (ref 5–25)
CALCIUM SERPL-MCNC: 8.4 MG/DL (ref 8.3–10.1)
CHLORIDE SERPL-SCNC: 95 MMOL/L (ref 100–108)
CO2 SERPL-SCNC: 25 MMOL/L (ref 21–32)
CREAT SERPL-MCNC: 1.14 MG/DL (ref 0.6–1.3)
EOSINOPHIL # BLD AUTO: 0 THOUSAND/ΜL (ref 0–0.61)
EOSINOPHIL NFR BLD AUTO: 0 % (ref 0–6)
ERYTHROCYTE [DISTWIDTH] IN BLOOD BY AUTOMATED COUNT: 12.8 % (ref 11.6–15.1)
GFR SERPL CREATININE-BSD FRML MDRD: 70 ML/MIN/1.73SQ M
GLUCOSE SERPL-MCNC: 282 MG/DL (ref 65–140)
GLUCOSE SERPL-MCNC: 292 MG/DL (ref 65–140)
GLUCOSE SERPL-MCNC: 306 MG/DL (ref 65–140)
GLUCOSE SERPL-MCNC: 308 MG/DL (ref 65–140)
GLUCOSE SERPL-MCNC: 318 MG/DL (ref 65–140)
HCT VFR BLD AUTO: 40.8 % (ref 36.5–49.3)
HGB BLD-MCNC: 13.4 G/DL (ref 12–17)
IMM GRANULOCYTES # BLD AUTO: 0.11 THOUSAND/UL (ref 0–0.2)
IMM GRANULOCYTES NFR BLD AUTO: 1 % (ref 0–2)
LYMPHOCYTES # BLD AUTO: 2.36 THOUSANDS/ΜL (ref 0.6–4.47)
LYMPHOCYTES NFR BLD AUTO: 17 % (ref 14–44)
MAGNESIUM SERPL-MCNC: 2.3 MG/DL (ref 1.6–2.6)
MCH RBC QN AUTO: 31.5 PG (ref 26.8–34.3)
MCHC RBC AUTO-ENTMCNC: 32.8 G/DL (ref 31.4–37.4)
MCV RBC AUTO: 96 FL (ref 82–98)
MONOCYTES # BLD AUTO: 0.7 THOUSAND/ΜL (ref 0.17–1.22)
MONOCYTES NFR BLD AUTO: 5 % (ref 4–12)
MRSA NOSE QL CULT: NORMAL
NEUTROPHILS # BLD AUTO: 10.59 THOUSANDS/ΜL (ref 1.85–7.62)
NEUTS SEG NFR BLD AUTO: 77 % (ref 43–75)
NRBC BLD AUTO-RTO: 0 /100 WBCS
PLATELET # BLD AUTO: 169 THOUSANDS/UL (ref 149–390)
PMV BLD AUTO: 10.2 FL (ref 8.9–12.7)
POTASSIUM SERPL-SCNC: 4.9 MMOL/L (ref 3.5–5.3)
RBC # BLD AUTO: 4.25 MILLION/UL (ref 3.88–5.62)
SODIUM SERPL-SCNC: 132 MMOL/L (ref 136–145)
WBC # BLD AUTO: 13.77 THOUSAND/UL (ref 4.31–10.16)

## 2018-11-02 PROCEDURE — 94660 CPAP INITIATION&MGMT: CPT

## 2018-11-02 PROCEDURE — 94640 AIRWAY INHALATION TREATMENT: CPT

## 2018-11-02 PROCEDURE — 85025 COMPLETE CBC W/AUTO DIFF WBC: CPT | Performed by: STUDENT IN AN ORGANIZED HEALTH CARE EDUCATION/TRAINING PROGRAM

## 2018-11-02 PROCEDURE — 82948 REAGENT STRIP/BLOOD GLUCOSE: CPT

## 2018-11-02 PROCEDURE — 80048 BASIC METABOLIC PNL TOTAL CA: CPT | Performed by: STUDENT IN AN ORGANIZED HEALTH CARE EDUCATION/TRAINING PROGRAM

## 2018-11-02 PROCEDURE — 83735 ASSAY OF MAGNESIUM: CPT | Performed by: STUDENT IN AN ORGANIZED HEALTH CARE EDUCATION/TRAINING PROGRAM

## 2018-11-02 PROCEDURE — 99232 SBSQ HOSP IP/OBS MODERATE 35: CPT | Performed by: INTERNAL MEDICINE

## 2018-11-02 PROCEDURE — 99232 SBSQ HOSP IP/OBS MODERATE 35: CPT | Performed by: STUDENT IN AN ORGANIZED HEALTH CARE EDUCATION/TRAINING PROGRAM

## 2018-11-02 PROCEDURE — 94760 N-INVAS EAR/PLS OXIMETRY 1: CPT

## 2018-11-02 RX ORDER — METHYLPREDNISOLONE SODIUM SUCCINATE 40 MG/ML
20 INJECTION, POWDER, LYOPHILIZED, FOR SOLUTION INTRAMUSCULAR; INTRAVENOUS EVERY 8 HOURS
Status: DISCONTINUED | OUTPATIENT
Start: 2018-11-02 | End: 2018-11-05

## 2018-11-02 RX ORDER — KETOROLAC TROMETHAMINE 30 MG/ML
15 INJECTION, SOLUTION INTRAMUSCULAR; INTRAVENOUS EVERY 8 HOURS SCHEDULED
Status: COMPLETED | OUTPATIENT
Start: 2018-11-02 | End: 2018-11-05

## 2018-11-02 RX ADMIN — INSULIN LISPRO 12 UNITS: 100 INJECTION, SOLUTION INTRAVENOUS; SUBCUTANEOUS at 17:13

## 2018-11-02 RX ADMIN — ATORVASTATIN CALCIUM 20 MG: 20 TABLET, FILM COATED ORAL at 17:12

## 2018-11-02 RX ADMIN — HYDROCODONE BITARTRATE AND ACETAMINOPHEN 1 TABLET: 5; 325 TABLET ORAL at 19:07

## 2018-11-02 RX ADMIN — MIRTAZAPINE 45 MG: 15 TABLET, FILM COATED ORAL at 21:48

## 2018-11-02 RX ADMIN — LEVALBUTEROL HYDROCHLORIDE 1.25 MG: 1.25 SOLUTION, CONCENTRATE RESPIRATORY (INHALATION) at 15:28

## 2018-11-02 RX ADMIN — BENZONATATE 100 MG: 100 CAPSULE ORAL at 17:11

## 2018-11-02 RX ADMIN — FLUTICASONE FUROATE AND VILANTEROL TRIFENATATE 1 PUFF: 200; 25 POWDER RESPIRATORY (INHALATION) at 09:10

## 2018-11-02 RX ADMIN — LEVALBUTEROL HYDROCHLORIDE 1.25 MG: 1.25 SOLUTION, CONCENTRATE RESPIRATORY (INHALATION) at 07:34

## 2018-11-02 RX ADMIN — LEVALBUTEROL HYDROCHLORIDE 1.25 MG: 1.25 SOLUTION, CONCENTRATE RESPIRATORY (INHALATION) at 19:40

## 2018-11-02 RX ADMIN — METOPROLOL TARTRATE 25 MG: 25 TABLET ORAL at 09:09

## 2018-11-02 RX ADMIN — HYDROCODONE BITARTRATE AND ACETAMINOPHEN 1 TABLET: 5; 325 TABLET ORAL at 10:41

## 2018-11-02 RX ADMIN — IPRATROPIUM BROMIDE 0.5 MG: 0.5 SOLUTION RESPIRATORY (INHALATION) at 11:00

## 2018-11-02 RX ADMIN — INSULIN LISPRO 12 UNITS: 100 INJECTION, SOLUTION INTRAVENOUS; SUBCUTANEOUS at 12:04

## 2018-11-02 RX ADMIN — AMLODIPINE BESYLATE 5 MG: 5 TABLET ORAL at 21:48

## 2018-11-02 RX ADMIN — HYDROCODONE BITARTRATE AND ACETAMINOPHEN 1 TABLET: 5; 325 TABLET ORAL at 04:32

## 2018-11-02 RX ADMIN — PREGABALIN 50 MG: 50 CAPSULE ORAL at 09:08

## 2018-11-02 RX ADMIN — METHYLPREDNISOLONE SODIUM SUCCINATE 40 MG: 40 INJECTION, POWDER, FOR SOLUTION INTRAMUSCULAR; INTRAVENOUS at 09:09

## 2018-11-02 RX ADMIN — INSULIN LISPRO 12 UNITS: 100 INJECTION, SOLUTION INTRAVENOUS; SUBCUTANEOUS at 09:12

## 2018-11-02 RX ADMIN — IPRATROPIUM BROMIDE 0.5 MG: 0.5 SOLUTION RESPIRATORY (INHALATION) at 19:40

## 2018-11-02 RX ADMIN — AZITHROMYCIN 500 MG: 250 TABLET, FILM COATED ORAL at 13:15

## 2018-11-02 RX ADMIN — BUSPIRONE HYDROCHLORIDE 15 MG: 10 TABLET ORAL at 09:08

## 2018-11-02 RX ADMIN — LEVALBUTEROL HYDROCHLORIDE 1.25 MG: 1.25 SOLUTION, CONCENTRATE RESPIRATORY (INHALATION) at 03:10

## 2018-11-02 RX ADMIN — LEVALBUTEROL HYDROCHLORIDE 1.25 MG: 1.25 SOLUTION, CONCENTRATE RESPIRATORY (INHALATION) at 23:44

## 2018-11-02 RX ADMIN — IPRATROPIUM BROMIDE 0.5 MG: 0.5 SOLUTION RESPIRATORY (INHALATION) at 03:10

## 2018-11-02 RX ADMIN — ALPRAZOLAM 2 MG: 0.5 TABLET ORAL at 21:52

## 2018-11-02 RX ADMIN — KETOROLAC TROMETHAMINE 15 MG: 30 INJECTION, SOLUTION INTRAMUSCULAR at 21:45

## 2018-11-02 RX ADMIN — LISINOPRIL 20 MG: 20 TABLET ORAL at 09:14

## 2018-11-02 RX ADMIN — METHYLPREDNISOLONE SODIUM SUCCINATE 20 MG: 40 INJECTION, POWDER, FOR SOLUTION INTRAMUSCULAR; INTRAVENOUS at 17:12

## 2018-11-02 RX ADMIN — ENOXAPARIN SODIUM 40 MG: 40 INJECTION SUBCUTANEOUS at 09:09

## 2018-11-02 RX ADMIN — PANTOPRAZOLE SODIUM 40 MG: 40 TABLET, DELAYED RELEASE ORAL at 06:04

## 2018-11-02 RX ADMIN — BENZONATATE 100 MG: 100 CAPSULE ORAL at 09:09

## 2018-11-02 RX ADMIN — KETOROLAC TROMETHAMINE 15 MG: 30 INJECTION, SOLUTION INTRAMUSCULAR at 14:00

## 2018-11-02 RX ADMIN — VENLAFAXINE HYDROCHLORIDE 150 MG: 150 CAPSULE, EXTENDED RELEASE ORAL at 09:11

## 2018-11-02 RX ADMIN — QUETIAPINE FUMARATE 400 MG: 200 TABLET, EXTENDED RELEASE ORAL at 21:54

## 2018-11-02 RX ADMIN — IPRATROPIUM BROMIDE 0.5 MG: 0.5 SOLUTION RESPIRATORY (INHALATION) at 07:34

## 2018-11-02 RX ADMIN — PREGABALIN 50 MG: 50 CAPSULE ORAL at 21:48

## 2018-11-02 RX ADMIN — IPRATROPIUM BROMIDE 0.5 MG: 0.5 SOLUTION RESPIRATORY (INHALATION) at 23:44

## 2018-11-02 RX ADMIN — INSULIN GLARGINE 60 UNITS: 100 INJECTION, SOLUTION SUBCUTANEOUS at 21:56

## 2018-11-02 RX ADMIN — HYDROCODONE POLISTIREX AND CHLORPHENIRAMINE POLISTIREX 5 ML: 10; 8 SUSPENSION, EXTENDED RELEASE ORAL at 17:19

## 2018-11-02 RX ADMIN — PREGABALIN 50 MG: 50 CAPSULE ORAL at 17:12

## 2018-11-02 RX ADMIN — ASPIRIN 81 MG 81 MG: 81 TABLET ORAL at 09:09

## 2018-11-02 RX ADMIN — INSULIN LISPRO 3 UNITS: 100 INJECTION, SOLUTION INTRAVENOUS; SUBCUTANEOUS at 21:56

## 2018-11-02 RX ADMIN — BUSPIRONE HYDROCHLORIDE 15 MG: 10 TABLET ORAL at 17:11

## 2018-11-02 RX ADMIN — LEVALBUTEROL HYDROCHLORIDE 1.25 MG: 1.25 SOLUTION, CONCENTRATE RESPIRATORY (INHALATION) at 11:00

## 2018-11-02 RX ADMIN — METOPROLOL TARTRATE 25 MG: 25 TABLET ORAL at 21:48

## 2018-11-02 RX ADMIN — AMLODIPINE BESYLATE 5 MG: 5 TABLET ORAL at 09:08

## 2018-11-02 RX ADMIN — IPRATROPIUM BROMIDE 0.5 MG: 0.5 SOLUTION RESPIRATORY (INHALATION) at 15:28

## 2018-11-02 RX ADMIN — BENZONATATE 100 MG: 100 CAPSULE ORAL at 21:48

## 2018-11-02 RX ADMIN — HYDROCODONE POLISTIREX AND CHLORPHENIRAMINE POLISTIREX 5 ML: 10; 8 SUSPENSION, EXTENDED RELEASE ORAL at 04:54

## 2018-11-02 NOTE — ASSESSMENT & PLAN NOTE
· Hx COPD on 2L NC at night  · Admitted with hypoxia, requiring NC   From COPD exac, PNA  · Required BIPAP overnight  · Cont NC for O2 sats >92%, wean as tolerated  · Lasix x2 given volume retention related to steroids with improvement  · Evaluated by oxygen requirement requiring 2 L supplementation

## 2018-11-02 NOTE — ASSESSMENT & PLAN NOTE
· RESOLVED  A/e/b leukocytosis, tachycardia, tachypnea     · CXR showed NAD  · CTA PE negative for PE or PNA  · Repeat CXR showed infiltrates, source is PNA  · LA elevated at 2 8, normalized  · Workup as noted above

## 2018-11-02 NOTE — PROGRESS NOTES
Progress Note - Pulmonary   Juli Sanches 60 y o  male MRN: [id number]  Unit/Bed#: 2 [place] [address] Encounter: [id number]      Assessment/Plan:   -acute on chronic hypoxemic respiratory insufficiency on 2 L HS at home:  -2/2 PNA viral > still pending resp pathogen profile > flu and rsv neg   -supportive care  -remains on 5 L NC  -tolerated bipap overnight  -centrilobular emphysema:  -cont IV steroids for exacerbation of COPD/emphysema,wean dose today to 20 Q 8 (will ultimately get 60 mg today 40 mg this AM and 20 mg timed dose for 2100 today)  -cont breo / atc nebs, tessalon,  > pt w/ coughing and recurrent R sided rib / pleuritic pain  -severe restrictive lung disease with FEV1 of 2 1L or 41% Predicted:  -2/2 morbid obesity w/ BMI 40 45  -requires weight loss for lung improvement  -use bipap in hospital  -Mild SHAVONNE:  -cpap HS 9 cm H20 > used bipap last night  -wears 2 L HS > will need ambulation study prior to d/c > suspect has higher chronic oxygen requirements  -Bilateral Patchy Infiltrates:  -Likely Viral PNA w/ likely confounding atelectatic component  -d/c zithromax / ceftriaxone today > trended procal and remains < 0 25 today/ f/u sputum / flu / viral PCR, blood cultures  -flu and RSV negative  -Pleuritic Chest Pain and Chest Wall Pain:  -improved w/ toradol yesterday   -can use doses x 3 today 15 mg each            ______________________________________________________________________    Subjective: Pt seen and examined at bedside  Overall pt feeling well  Still w/ some muscular / pleuritic R side pain w/ coughing  No fevers  Dyspnea feels somewhat better  Tele Events:     Vitals:   Temp:  [97 7 °F (36 5 °C)-99 5 °F (37 5 °C)] 97 7 °F (36 5 °C)  HR:  [] 88  Resp:  [18-28] 20  BP: (134-156)/(67-77) 156/77  FiO2 (%):  [35] 35  Weight (last 2 days)     Date/Time   Weight    10/31/18 0854  132 (290)            Oxygen Therapy  SpO2: 96 %  O2 Flow Rate (L/min): 5 L/min        IV Infusions:       Nutrition: Diet Orders            Start     Ordered    10/31/18 1259  Diet Campos/CHO Controlled; Consistent Carbohydrate Diet Level 2 (5 carb servings/75 grams CHO/meal)  Diet effective now     Question Answer Comment   Diet Type Campos/CHO Controlled    Campos/CHO Controlled Consistent Carbohydrate Diet Level 2 (5 carb servings/75 grams CHO/meal)    RD to adjust diet per protocol? Yes        10/31/18 1258    10/31/18 1227  Room Service  Once     Question:  Type of Service  Answer:  Room Service-Appropriate    10/31/18 1227          Ins/Outs:   I/O       10/31 0701 - 11/01 0700 11/01 0701 - 11/02 0700 11/02 0701 - 11/03 0700    P  O    720    I V  (mL/kg)  20 (0 2)     IV Piggyback 50      Total Intake(mL/kg) 50 (0 4) 20 (0 2) 720 (5 5)    Urine (mL/kg/hr)  700 (0 2)     Total Output   700      Net +50 -680 +720           Unmeasured Urine Occurrence   2 x    Unmeasured Stool Occurrence 1 x            Lines/Drains:  Invasive Devices     Peripheral Intravenous Line            Peripheral IV 10/31/18 Right Antecubital 2 days                 Active medications:  Scheduled Meds:  Current Facility-Administered Medications:  acetaminophen 650 mg Oral Q6H PRN Brianna Le MD    ALPRAZolam 2 mg Oral HS PRN Brianna Le MD    amLODIPine 5 mg Oral Q12H 3630 Lewis Paul MD    aspirin 81 mg Oral QAM Brianna Le MD    atorvastatin 20 mg Oral Daily With Alin Martino MD    azithromycin 500 mg Oral Q24H Brianna Le MD    benzonatate 100 mg Oral TID Brianna Le MD    busPIRone 15 mg Oral BID Brianna Le MD    calcium carbonate 1,000 mg Oral Daily PRN Brianna Le MD    cefTRIAXone 2,000 mg Intravenous Q24H Brianna Le MD Last Rate: 2,000 mg (11/01/18 1459)   Dapagliflozin Propanediol 10 mg Oral QAM Brianna Le MD    enoxaparin 40 mg Subcutaneous Daily Brianna Le MD    ergocalciferol 50,000 Units Oral Once per day on Mon Thu Brianna Le MD    fluticasone-vilanterol 1 puff Inhalation Daily Brianna Le MD    HYDROcodone-acetaminophen 1 tablet Oral Q6H PRN Gianna Lugo DO    hydrocodone-chlorpheniramine polistirex 5 mL Oral Q12H PRN Naomy Lan MD    ibuprofen 400 mg Oral Q6H PRN Naomy Lan MD    insulin glargine 60 Units Subcutaneous HS Naomy Lan MD    insulin lispro 1-5 Units Subcutaneous HS Naomy Lan MD    insulin lispro 12 Units Subcutaneous TID With Meals Naomy Lan MD    insulin lispro 4-20 Units Subcutaneous TID AC Naomy Lan MD    ipratropium 0 5 mg Nebulization Q4H Naomy Lan MD    levalbuterol 0 63 mg Nebulization Q2H PRN Naomy Lan MD    levalbuterol 1 25 mg Nebulization Q4H Naomy Lan MD    lisinopril 20 mg Oral Daily Naomy Lan MD    methylPREDNISolone sodium succinate 40 mg Intravenous Q12H [address] Juanis Escalante PA-C    metoprolol tartrate 25 mg Oral Q12H 3630 [facility], MD    mirtazapine 45 mg Oral HS Naomy Lan MD    ondansetron 4 mg Intravenous Q6H PRN Naomy Lan MD    pantoprazole 40 mg Oral Early Morning Naomy Lan MD    pregabalin 50 mg Oral TID Naomy Lan MD    QUEtiapine 400 mg Oral HS Naomy Lan MD    venlafaxine 150 mg Oral QAM Naomy Lan MD      PRN Meds:  acetaminophen 650 mg Q6H PRN   ALPRAZolam 2 mg HS PRN   calcium carbonate 1,000 mg Daily PRN   HYDROcodone-acetaminophen 1 tablet Q6H PRN   hydrocodone-chlorpheniramine polistirex 5 mL Q12H PRN   ibuprofen 400 mg Q6H PRN   levalbuterol 0 63 mg Q2H PRN   ondansetron 4 mg Q6H PRN     ____________________________________________________________________      Physical Exam   Constitutional: He is oriented to person, place, and time  He appears well-developed  Obese body habitus   HENT:   Head: Normocephalic and atraumatic  Eyes: Pupils are equal, round, and reactive to light  Conjunctivae are normal    Neck: Normal range of motion  Neck supple  No JVD present  No tracheal deviation present  Cardiovascular: Normal rate, regular rhythm and normal heart sounds  Exam reveals no gallop and no friction rub  No murmur heard    Pulmonary/Chest: Effort normal  No accessory muscle usage or stridor  No tachypnea  No respiratory distress  He has no decreased breath sounds  He has no wheezes  He has rhonchi in the right lower field  He has no rales  He exhibits no tenderness  Right lower field rhonchi that clear with coughing   Abdominal: Soft  Bowel sounds are normal    Obese abdomen   Musculoskeletal: Normal range of motion  He exhibits edema  Trace lower extremity edema    Chronic lower extremity erythema   Neurological: He is alert and oriented to person, place, and time  Skin: Skin is warm and dry     Psychiatric: He has a normal mood and affect            ____________________________________________________________________    Labs:   CBC:   Results from last 7 days  Lab Units 11/02/18  0626 11/01/18  0518 10/31/18  0926   WBC Thousand/uL 13 77* 14 84* 14 12*   HEMOGLOBIN g/dL 13 4 14 3 15 9   HEMATOCRIT % 40 8 43 7 47 6   MCV fL 96 95 94   PLATELETS Thousands/uL 169 202 203     CMP:   Results from last 7 days  Lab Units 11/02/18  0626 11/01/18  0518 10/31/18  0926 10/29/18  0801   POTASSIUM mmol/L 4 9 4 5 4 3 4 0   CHLORIDE mmol/L 95* 99* 100 98*   CO2 mmol/L 25 22 23 26   BUN mg/dL 35* 35* 24 19   CREATININE mg/dL 1 14 1 09 1 10 1 16   CALCIUM mg/dL 8 4 8 3 9 9 9 1   AST U/L  --   --  32 31   ALT U/L  --   --  55 49   ALK PHOS U/L  --   --  62 58   EGFR ml/min/1 73sq m 70 74 73 69     Magnesium:   Results from last 7 days  Lab Units 11/02/18  0626   MAGNESIUM mg/dL 2 3     Phosphorous:   Results from last 7 days  Lab Units 10/29/18  0801   PHOSPHORUS mg/dL 3 6     Troponin:   Results from last 7 days  Lab Units 10/31/18  0926   TROPONIN I ng/mL <0 02     PT/INR:   Results from last 7 days  Lab Units 10/31/18  0926   PTT seconds 23*   INR  0 98     Lactic Acid:   Results from last 7 days  Lab Units 11/01/18  0212 11/01/18  0026   LACTIC ACID mmol/L 1 8 2 7*     BNP:   Results from last 7 days  Lab Units 10/31/18  0926   NT-PRO BNP pg/mL 36     TSH:   Results from last 7 days  Lab Units 10/29/18  0801   TSH 3RD GENERATON uIU/mL 1 090     Procalcitonin: Invalid input(s): PROCALCITONIN      Imaging:   XR chest portable   Final Result by Abhishek Haynes MD (11/01 7678)      Bibasilar infiltrates and/or atelectasis  Workstation performed: [id number]         CTA ED chest PE study   Final Result by Pratik Acuna MD (10/31 2948)   No PE  Workstation performed: [id number]         XR chest 1 view portable   Final Result by Abhishek Haynes MD (72/59 9645)   Cardiomegaly  Scarring at the right lung base  Workstation performed: [id number]                 Micro: Lab Results   Component Value Date    BLOODCX No Growth at 24 hrs  10/31/2018    BLOODCX No Growth at 24 hrs  10/31/2018    BLOODCX No Growth After 5 Days  09/06/2017    URINECX 8137-1971 cfu/ml Mixed Contaminants X2 03/20/2017    URINECX No Growth <1000 cfu/mL 11/27/2016    URINECX No Growth <1000 cfu/mL 09/02/2016    SPUTUMCULTUR Test not performed  Suggest repeat specimen   11/08/2017    SPUTUMCULTUR 1+ Growth of Staphylococcus aureus 03/20/2017    SPUTUMCULTUR 1+ Growth of Mixed Respiratory Francine 03/20/2017    MRSACULTURE  10/31/2018     No Methicillin Resistant Staphlyococcus aureus (MRSA) isolated        Results from last 7 days  Lab Units 10/31/18  1447   INFLUENZA B PCR  None Detected   RSV PCR  None Detected           Code Status: Level 1 - Full Code

## 2018-11-02 NOTE — PROGRESS NOTES
Progress Note Arnoldo Paula 1960, 60 y o  male MRN: [id number]    Unit/Bed#: [facility] Encounter: [id number]    Primary Care Provider: Meri Pelletier MD   Date and time admitted to hospital: 10/31/2018  8:49 AM        * COPD with exacerbation ([facility] [address] )   Assessment & Plan    · Patient presenting with SOB, cough, hypoxia  · Hx severe restrictive lung disease with FEV1 of 2 1L or 41% Predicted  · Received loading dose of IV solumedrol in ED   · Stopped mucinex given painful coughing  · Pulmonary recs appreciated  · Cont atrovent/xopenex neb q4 and q2 PRN  · Cough suppression with tessalon perles and tussionex  · Wean solumedrol to 20mg q8hr  · toradol for pleuritic and chest wall pain     Acute on chronic respiratory failure with hypoxia (HCC)   Assessment & Plan    · Hx COPD on 2L NC at night  · Admitted with hypoxia, requiring NC  From COPD exac, PNA  · Required BIPAP overnight  · Currently on 5L NC for O2 sats >92%, wean as tolerated  · Will need home O2 eval prior to DC     Pneumonia   Assessment & Plan    · Pt presenting with cough and subjective fevers  CAP vs viral PNA with atelectasis   · Initial CXR showed NAD, CTA chest negative for PNA  · Repeat CXR showed Bibasilar infiltrates and/or atelectasis  · FLu/RSV PCR negative  · Cont toradol PRN pleuritic chest pain  · Received 4 days of Zithro/rocephin  · respiratory pathogen panel pending  · DC Abx today as serial pro calcitonin negative       Sepsis (HCC)   Assessment & Plan    · A/e/b leukocytosis, tachycardia, tachypnea     · CXR showed NAD  · CTA PE negative for PE or PNA  · Repeat CXR showed infiltrates, source is PNA  · LA elevated at 2 8, normalized  · Workup as noted above     Benign essential hypertension   Assessment & Plan    · BP initially elevated on admission  · Cont home meds, BP improved     SHAVONNE (obstructive sleep apnea)   Assessment & Plan    · Cont CPAP at [facility]  · Will need home O2 eval prior to DC     Diabetes mellitus type 2, uncontrolled [facility])   Assessment & Plan    Lab Results   Component Value Date    HGBA1C 7 6 (H) 10/29/2018       Recent Labs      18   1101  18   1548  18   2102  18   0729   POCGLU  441*  307*  214*  292*       Blood Sugar Average: Last 72 hrs:  (P) 340 [id number]     Cont home lantus  Hold PO meds  Place on ISS, increase coverage given hyperglycemia from steroids  Blood sugars improving     Bipolar affective disorder (HCC)   Assessment & Plan    · Cont home meds: effexor, seroquel, remeron, cariprazine, buspar, xanax           VTE Pharmacologic Prophylaxis:   Pharmacologic: Enoxaparin (Lovenox)  Mechanical VTE Prophylaxis in Place: Yes    Patient Centered Rounds: I have performed bedside rounds with nursing staff today  Discussions with Specialists or Other Care Team Provider: Yes    Education and Discussions with Family / Patient:Yes    Time Spent for Care: 30 minutes  More than 50% of total time spent on counseling and coordination of care as described above  Current Length of Stay: 2 day(s)    Current Patient Status: Inpatient     Discharge Plan: pending    Code Status: Level 1 - Full Code      Subjective:   Continues to cough, improved with Toradol  [place] good after BIPAP overnight last night   Still SOB but better    Objective:       Vitals:   Temp (24hrs), Av 3 °F (36 8 °C), Min:97 7 °F (36 5 °C), Max:99 5 °F (37 5 °C)    Temp:  [97 7 °F (36 5 °C)-99 5 °F (37 5 °C)] 97 7 °F (36 5 °C)  HR:  [] 88  Resp:  [18-28] 20  BP: (134-156)/(67-77) 156/77  SpO2:  [90 %-96 %] 96 %  Body mass index is 40 45 kg/m²  Input and Output Summary (last 24 hours): Intake/Output Summary (Last 24 hours) at 18 1152  Last data filed at 18 0908   Gross per 24 hour   Intake              720 ml   Output                0 ml   Net              720 ml       Physical Exam:     Physical Exam   Constitutional: He is oriented to person, place, and time   He appears well-developed  Obese    HENT:   Head: Normocephalic and atraumatic  Cardiovascular: Normal rate, regular rhythm and normal heart sounds  No murmur heard  Pulmonary/Chest: No respiratory distress  He has no wheezes  He has no rales  Increased work of breathing, +course breath sounds with course rhonchi, decreased BS at the bases  On 5L O2   Abdominal: Soft  Bowel sounds are normal  He exhibits no distension  There is no tenderness  There is no rebound  Musculoskeletal: He exhibits edema (trace)  He exhibits no tenderness or deformity  Neurological: He is alert and oriented to person, place, and time  Skin: Skin is warm and dry  Psychiatric: He has a normal mood and affect  His behavior is normal    Nursing note and vitals reviewed  Additional Data:     Labs:      Results from last 7 days  Lab Units 11/02/18  0626   WBC Thousand/uL 13 77*   HEMOGLOBIN g/dL 13 4   HEMATOCRIT % 40 8   PLATELETS Thousands/uL 169   NEUTROS PCT % 77*   LYMPHS PCT % 17   MONOS PCT % 5   EOS PCT % 0       Results from last 7 days  Lab Units 11/02/18  0626  10/31/18  0926   POTASSIUM mmol/L 4 9  < > 4 3   CHLORIDE mmol/L 95*  < > 100   CO2 mmol/L 25  < > 23   BUN mg/dL 35*  < > 24   CREATININE mg/dL 1 14  < > 1 10   CALCIUM mg/dL 8 4  < > 9 9   ALK PHOS U/L  --   --  62   ALT U/L  --   --  55   AST U/L  --   --  32   < > = values in this interval not displayed  Results from last 7 days  Lab Units 10/31/18  0926   INR  0 98       * I Have Reviewed All Lab Data Listed Above  * Additional Pertinent Lab Tests Reviewed: [address] 65 Admission Reviewed    Imaging:  Xr Chest Portable    Result Date: 11/1/2018  Narrative: CHEST INDICATION:   progressive hypoxemia  COMPARISON:  10/31/2018 EXAM PERFORMED/VIEWS:  XR CHEST PORTABLE 1 image FINDINGS: Cardiomediastinal silhouette appears enlarged  No evidence of heart failure  Bibasilar infiltrates and/or atelectasis  Cannot exclude small pleural effusions  Osseous structures appear within normal limits for patient age  Impression: Bibasilar infiltrates and/or atelectasis  Workstation performed: [id number]     Xr Chest 1 View Portable    Result Date: 10/31/2018  Narrative: CHEST INDICATION:   SOB  Shortness of breath COMPARISON:  11/06/2017 EXAM PERFORMED/VIEWS:  XR CHEST PORTABLE 1 image FINDINGS: Cardiomediastinal silhouette appears enlarged  The pulmonary vessels are normal  Scarring at the right lung base  No infiltrates  Osseous structures appear within normal limits for patient age  Impression: Cardiomegaly  Scarring at the right lung base  Workstation performed: [id number]     Cta Ed Chest Pe Study    Result Date: 10/31/2018  Narrative: CTA - CHEST WITH IV CONTRAST - PULMONARY ANGIOGRAM INDICATION:   pleuritic chest pain  COMPARISON: None  TECHNIQUE: CTA examination of the chest was performed using angiographic technique according to a protocol specifically tailored to evaluate for pulmonary embolism  Axial, sagittal, and coronal 2D reformatted images were created from the source data and  submitted for interpretation  In addition, coronal 3D MIP postprocessing was performed on the acquisition scanner  Radiation dose length product (DLP) for this visit:  791 61 mGy-cm   This examination, like all CT scans performed in the [facility], was performed utilizing techniques to minimize radiation dose exposure, including the use of iterative  reconstruction and automated exposure control  IV Contrast:  100 mL of iohexol (OMNIPAQUE)  FINDINGS: PULMONARY ARTERIAL TREE:  No pulmonary embolus is seen  LUNGS:  Lungs are clear  There is no tracheal or endobronchial lesion  PLEURA:  Unremarkable  HEART/GREAT VESSELS:  Cardiomegaly evident  MEDIASTINUM AND CHRISTOPHER:  Unremarkable  CHEST WALL AND LOWER NECK:   Unremarkable  VISUALIZED STRUCTURES IN THE UPPER ABDOMEN:  Enlarged fatty liver evident  Status post cholecystectomy    Fatty replaced pancreas  OSSEOUS STRUCTURES:  No acute fracture or destructive osseous lesion  Impression: No PE  Workstation performed: [id number]     Imaging Reports Reviewed by myself    Cultures:   Blood Culture:   Lab Results   Component Value Date    BLOODCX No Growth at 24 hrs  10/31/2018    BLOODCX No Growth at 24 hrs  10/31/2018    BLOODCX No Growth After 5 Days  09/06/2017    BLOODCX No Growth After 5 Days  09/06/2017    BLOODCX Diphtheroids 03/20/2017    BLOODCX No Growth After 5 Days  03/20/2017    BLOODCX No Growth After 5 Days  01/12/2016    BLOODCX No Growth After 5 Days   01/12/2016     Urine Culture:   Lab Results   Component Value Date    URINECX 6714-3901 cfu/ml Mixed Contaminants X2 03/20/2017    URINECX No Growth <1000 cfu/mL 11/27/2016    URINECX No Growth <1000 cfu/mL 09/02/2016     Sputum Culture: No components found for: SPUTUMCX  Wound Culture: No results found for: WOUNDCULT    Last 24 Hours Medication List:     Current Facility-Administered Medications:  acetaminophen 650 mg Oral Q6H PRN Pasha Trent MD    ALPRAZolam 2 mg Oral HS PRN Pasha Trent MD    amLODIPine 5 mg Oral Q12H [address] [facility], MD    aspirin 81 mg Oral QAHIREN Trent MD    atorvastatin 20 mg Oral Daily With Alin Martino MD    azithromycin 500 mg Oral Q24H Pasha Trent MD    benzonatate 100 mg Oral TID Pasha Trent MD    busPIRone 15 mg Oral BID Pasha Trent MD    calcium carbonate 1,000 mg Oral Daily PRN Pasha Trent MD    cefTRIAXone 2,000 mg Intravenous Q24H Pasha Trent MD Last Rate: 2,000 mg (11/01/18 1459)   Dapagliflozin Propanediol 10 mg Oral QAM Pasha Trent MD    enoxaparin 40 mg Subcutaneous Daily Pasha Trent MD    ergocalciferol 50,000 Units Oral Once per day on Mon Thu Pasha Trent MD    fluticasone-vilanterol 1 puff Inhalation Daily Pasha Trent MD    HYDROcodone-acetaminophen 1 tablet Oral Q6H PRN Leeland Masker, DO    hydrocodone-chlorpheniramine polistirex 5 mL Oral Q12H PRN Pasha Trent MD    ibuprofen 400 mg Oral Q6H PRN Dianna Issa MD    insulin glargine 60 Units Subcutaneous HS Dianna Issa MD    insulin lispro 1-5 Units Subcutaneous HS Dianna Issa MD    insulin lispro 12 Units Subcutaneous TID With Meals Dianna Issa MD    insulin lispro 4-20 Units Subcutaneous TID AC Dianna Issa MD    ipratropium 0 5 mg Nebulization Q4H Dianna Issa MD    ketorolac 15 mg Intravenous [facility] Iqra Parker PA-C    levalbuterol 0 63 mg Nebulization Q2H PRN Dianna Issa MD    levalbuterol 1 25 mg Nebulization Q4H Dianna Issa MD    lisinopril 20 mg Oral Daily Dianna Issa MD    methylPREDNISolone sodium succinate 40 mg Intravenous Q12H [address] Iqra Parker PA-C    metoprolol tartrate 25 mg Oral Q12H [address] [facility], MD    mirtazapine 45 mg Oral HS Dianna Issa MD    ondansetron 4 mg Intravenous Q6H PRN Dianna Issa MD    pantoprazole 40 mg Oral Early Morning Dianna Issa MD    pregabalin 50 mg Oral TID Dianna Issa MD    QUEtiapine 400 mg Oral HS Dianna Issa MD    venlafaxine 150 mg Oral Gwen Cruz MD         Today, Patient Was Seen By: Dianna Issa MD    ** Please Note: Dragon 360 Dictation voice to text software may have been used in the creation of this document   **

## 2018-11-02 NOTE — PLAN OF CARE
Problem: DISCHARGE PLANNING - CARE MANAGEMENT  Goal: Discharge to post-acute care or home with appropriate resources  INTERVENTIONS:  - Conduct assessment to determine patient/family and health care team treatment goals, and need for post-acute services based on payer coverage, community resources, and patient preferences, and barriers to discharge  - Address psychosocial, clinical, and financial barriers to discharge as identified in assessment in conjunction with the patient/family and health care team  - Arrange appropriate level of post-acute services according to patient's   needs and preference and payer coverage in collaboration with the physician and health care team  - Communicate with and update the patient/family, physician, and health care team regarding progress on the discharge plan  - Arrange appropriate transportation to post-acute venues    RETURN HOME WHEN STABLE  Outcome: Progressing  Pt's plan is to return home when discharged  Pt unsure as to whether he will need a cane or walker when ready for discharge depending on his progress  Offered assistance with DME and any other needs at discharge

## 2018-11-02 NOTE — SOCIAL WORK
SW met with pt to assess needs and discuss plans  Discussed goals of making sure pt's needs are met upon discharge, pt's preferences are taken into account, pt understands her health condition, medications and symptoms to watch for after returning home and pt is aware of any follow up appointments recommended by hospital physician  Pt lives with a roommate in an apartment  Pt is relatively independent  No need for assistive device for ambulation in the past   Pt has oxygen, CPAP and nebulizer from [facility]  No need for HHC  Pt's PCP is Dr Melvi Barros MD   Preferred pharmacy is [facility]  Per pt he has prescription coverage and no difficulty getting his medication as prescribed  Pt's plan is to return home when discharged  Pt unsure as to whether he will need a cane or walker when ready for discharge depending on his progress  Offered assistance with DME and any other needs at discharge  Discussed enrollment in Medicare Bundle Program   Provided Bundle Program Notification Letter  MANSI/KENDAL will be available to assist as needed

## 2018-11-02 NOTE — ASSESSMENT & PLAN NOTE
Lab Results   Component Value Date    HGBA1C 7 6 (H) 10/29/2018       Recent Labs      11/06/18   2036  11/07/18   0705  11/07/18   1152  11/07/18   1506   POCGLU  273*  197*  449*  390*       Blood Sugar Average: Last 72 hrs:  (P) 970 [id number]     Elevated secondary to steroids and poor dietary adherence  Continue home regimen with sliding scale  Resume Victoza and metformin after discharge

## 2018-11-03 LAB
ADENOVIRUS: NOT DETECTED
ANION GAP SERPL CALCULATED.3IONS-SCNC: 12 MMOL/L (ref 4–13)
BUN SERPL-MCNC: 30 MG/DL (ref 5–25)
C PNEUM DNA SPEC QL NAA+PROBE: NOT DETECTED
CALCIUM SERPL-MCNC: 9 MG/DL (ref 8.3–10.1)
CHLORIDE SERPL-SCNC: 96 MMOL/L (ref 100–108)
CO2 SERPL-SCNC: 25 MMOL/L (ref 21–32)
CREAT SERPL-MCNC: 0.88 MG/DL (ref 0.6–1.3)
ERYTHROCYTE [DISTWIDTH] IN BLOOD BY AUTOMATED COUNT: 12.4 % (ref 11.6–15.1)
FLUAV H1 RNA SPEC QL NAA+PROBE: NOT DETECTED
FLUAV H3 RNA SPEC QL NAA+PROBE: NOT DETECTED
FLUAV RNA SPEC QL NAA+PROBE: NOT DETECTED
FLUBV RNA SPEC QL NAA+PROBE: NOT DETECTED
GFR SERPL CREATININE-BSD FRML MDRD: 94 ML/MIN/1.73SQ M
GLUCOSE SERPL-MCNC: 263 MG/DL (ref 65–140)
GLUCOSE SERPL-MCNC: 297 MG/DL (ref 65–140)
GLUCOSE SERPL-MCNC: 300 MG/DL (ref 65–140)
GLUCOSE SERPL-MCNC: 304 MG/DL (ref 65–140)
GLUCOSE SERPL-MCNC: 305 MG/DL (ref 65–140)
HBOV DNA SPEC QL NAA+PROBE: NOT DETECTED
HCOV 229E RNA SPEC QL NAA+PROBE: NOT DETECTED
HCOV HKU1 RNA SPEC QL NAA+PROBE: NOT DETECTED
HCOV NL63 RNA SPEC QL NAA+PROBE: NOT DETECTED
HCOV OC43 RNA SPEC QL NAA+PROBE: NOT DETECTED
HCT VFR BLD AUTO: 40.1 % (ref 36.5–49.3)
HGB BLD-MCNC: 13.5 G/DL (ref 12–17)
HPIV1 RNA SPEC QL NAA+PROBE: NOT DETECTED
HPIV2 RNA SPEC QL NAA+PROBE: NOT DETECTED
HPIV3 RNA SPEC QL NAA+PROBE: NOT DETECTED
HPIV4 RNA SPEC QL NAA+PROBE: NOT DETECTED
M PNEUMO DNA SPEC QL NAA+PROBE: NOT DETECTED
MAGNESIUM SERPL-MCNC: 2.2 MG/DL (ref 1.6–2.6)
MCH RBC QN AUTO: 31.5 PG (ref 26.8–34.3)
MCHC RBC AUTO-ENTMCNC: 33.7 G/DL (ref 31.4–37.4)
MCV RBC AUTO: 94 FL (ref 82–98)
METAPNEUMOVIRUS: NOT DETECTED
PLATELET # BLD AUTO: 162 THOUSANDS/UL (ref 149–390)
PMV BLD AUTO: 10.1 FL (ref 8.9–12.7)
POTASSIUM SERPL-SCNC: 4.7 MMOL/L (ref 3.5–5.3)
PROCALCITONIN SERPL-MCNC: 0.08 NG/ML
RBC # BLD AUTO: 4.28 MILLION/UL (ref 3.88–5.62)
RHINOVIRUS RNA SPEC QL NAA+PROBE: NOT DETECTED
RSV A RNA SPEC QL NAA+PROBE: NOT DETECTED
RSV B RNA SPEC QL NAA+PROBE: NOT DETECTED
SODIUM SERPL-SCNC: 133 MMOL/L (ref 136–145)
WBC # BLD AUTO: 10.21 THOUSAND/UL (ref 4.31–10.16)

## 2018-11-03 PROCEDURE — 84145 PROCALCITONIN (PCT): CPT | Performed by: STUDENT IN AN ORGANIZED HEALTH CARE EDUCATION/TRAINING PROGRAM

## 2018-11-03 PROCEDURE — 85027 COMPLETE CBC AUTOMATED: CPT | Performed by: STUDENT IN AN ORGANIZED HEALTH CARE EDUCATION/TRAINING PROGRAM

## 2018-11-03 PROCEDURE — 99232 SBSQ HOSP IP/OBS MODERATE 35: CPT | Performed by: INTERNAL MEDICINE

## 2018-11-03 PROCEDURE — 83735 ASSAY OF MAGNESIUM: CPT | Performed by: STUDENT IN AN ORGANIZED HEALTH CARE EDUCATION/TRAINING PROGRAM

## 2018-11-03 PROCEDURE — 94660 CPAP INITIATION&MGMT: CPT

## 2018-11-03 PROCEDURE — 94760 N-INVAS EAR/PLS OXIMETRY 1: CPT

## 2018-11-03 PROCEDURE — 82948 REAGENT STRIP/BLOOD GLUCOSE: CPT

## 2018-11-03 PROCEDURE — 80048 BASIC METABOLIC PNL TOTAL CA: CPT | Performed by: STUDENT IN AN ORGANIZED HEALTH CARE EDUCATION/TRAINING PROGRAM

## 2018-11-03 PROCEDURE — 94640 AIRWAY INHALATION TREATMENT: CPT

## 2018-11-03 PROCEDURE — 99232 SBSQ HOSP IP/OBS MODERATE 35: CPT | Performed by: STUDENT IN AN ORGANIZED HEALTH CARE EDUCATION/TRAINING PROGRAM

## 2018-11-03 RX ORDER — AZITHROMYCIN 250 MG/1
500 TABLET, FILM COATED ORAL EVERY 24 HOURS
Status: DISCONTINUED | OUTPATIENT
Start: 2018-11-03 | End: 2018-11-05

## 2018-11-03 RX ORDER — BENZONATATE 100 MG/1
200 CAPSULE ORAL 3 TIMES DAILY
Status: DISCONTINUED | OUTPATIENT
Start: 2018-11-03 | End: 2018-11-07 | Stop reason: HOSPADM

## 2018-11-03 RX ADMIN — METOPROLOL TARTRATE 25 MG: 25 TABLET ORAL at 21:41

## 2018-11-03 RX ADMIN — AMLODIPINE BESYLATE 5 MG: 5 TABLET ORAL at 21:40

## 2018-11-03 RX ADMIN — KETOROLAC TROMETHAMINE 15 MG: 30 INJECTION, SOLUTION INTRAMUSCULAR at 07:10

## 2018-11-03 RX ADMIN — LEVALBUTEROL HYDROCHLORIDE 1.25 MG: 1.25 SOLUTION, CONCENTRATE RESPIRATORY (INHALATION) at 12:32

## 2018-11-03 RX ADMIN — KETOROLAC TROMETHAMINE 15 MG: 30 INJECTION, SOLUTION INTRAMUSCULAR at 14:22

## 2018-11-03 RX ADMIN — VENLAFAXINE HYDROCHLORIDE 150 MG: 150 CAPSULE, EXTENDED RELEASE ORAL at 08:45

## 2018-11-03 RX ADMIN — METHYLPREDNISOLONE SODIUM SUCCINATE 20 MG: 40 INJECTION, POWDER, FOR SOLUTION INTRAMUSCULAR; INTRAVENOUS at 16:37

## 2018-11-03 RX ADMIN — INSULIN LISPRO 12 UNITS: 100 INJECTION, SOLUTION INTRAVENOUS; SUBCUTANEOUS at 12:27

## 2018-11-03 RX ADMIN — INSULIN GLARGINE 60 UNITS: 100 INJECTION, SOLUTION SUBCUTANEOUS at 21:44

## 2018-11-03 RX ADMIN — LEVALBUTEROL HYDROCHLORIDE 1.25 MG: 1.25 SOLUTION, CONCENTRATE RESPIRATORY (INHALATION) at 20:30

## 2018-11-03 RX ADMIN — BENZONATATE 200 MG: 100 CAPSULE ORAL at 21:41

## 2018-11-03 RX ADMIN — IPRATROPIUM BROMIDE 0.5 MG: 0.5 SOLUTION RESPIRATORY (INHALATION) at 12:32

## 2018-11-03 RX ADMIN — LEVALBUTEROL HYDROCHLORIDE 1.25 MG: 1.25 SOLUTION, CONCENTRATE RESPIRATORY (INHALATION) at 07:39

## 2018-11-03 RX ADMIN — PREGABALIN 50 MG: 50 CAPSULE ORAL at 08:43

## 2018-11-03 RX ADMIN — INSULIN LISPRO 8 UNITS: 100 INJECTION, SOLUTION INTRAVENOUS; SUBCUTANEOUS at 08:42

## 2018-11-03 RX ADMIN — BUSPIRONE HYDROCHLORIDE 15 MG: 10 TABLET ORAL at 18:02

## 2018-11-03 RX ADMIN — KETOROLAC TROMETHAMINE 15 MG: 30 INJECTION, SOLUTION INTRAMUSCULAR at 23:01

## 2018-11-03 RX ADMIN — QUETIAPINE FUMARATE 400 MG: 200 TABLET, EXTENDED RELEASE ORAL at 21:44

## 2018-11-03 RX ADMIN — PANTOPRAZOLE SODIUM 40 MG: 40 TABLET, DELAYED RELEASE ORAL at 07:09

## 2018-11-03 RX ADMIN — LEVALBUTEROL HYDROCHLORIDE 1.25 MG: 1.25 SOLUTION, CONCENTRATE RESPIRATORY (INHALATION) at 16:06

## 2018-11-03 RX ADMIN — METHYLPREDNISOLONE SODIUM SUCCINATE 20 MG: 40 INJECTION, POWDER, FOR SOLUTION INTRAMUSCULAR; INTRAVENOUS at 08:44

## 2018-11-03 RX ADMIN — PREGABALIN 50 MG: 50 CAPSULE ORAL at 21:41

## 2018-11-03 RX ADMIN — MIRTAZAPINE 45 MG: 15 TABLET, FILM COATED ORAL at 21:41

## 2018-11-03 RX ADMIN — LEVALBUTEROL HYDROCHLORIDE 1.25 MG: 1.25 SOLUTION, CONCENTRATE RESPIRATORY (INHALATION) at 03:05

## 2018-11-03 RX ADMIN — PREGABALIN 50 MG: 50 CAPSULE ORAL at 16:37

## 2018-11-03 RX ADMIN — BENZONATATE 200 MG: 100 CAPSULE ORAL at 16:37

## 2018-11-03 RX ADMIN — INSULIN LISPRO 12 UNITS: 100 INJECTION, SOLUTION INTRAVENOUS; SUBCUTANEOUS at 16:38

## 2018-11-03 RX ADMIN — IPRATROPIUM BROMIDE 0.5 MG: 0.5 SOLUTION RESPIRATORY (INHALATION) at 03:05

## 2018-11-03 RX ADMIN — BUSPIRONE HYDROCHLORIDE 15 MG: 10 TABLET ORAL at 08:44

## 2018-11-03 RX ADMIN — HYDROCODONE BITARTRATE AND ACETAMINOPHEN 1 TABLET: 5; 325 TABLET ORAL at 12:30

## 2018-11-03 RX ADMIN — IPRATROPIUM BROMIDE 0.5 MG: 0.5 SOLUTION RESPIRATORY (INHALATION) at 20:31

## 2018-11-03 RX ADMIN — AZITHROMYCIN 500 MG: 250 TABLET, FILM COATED ORAL at 14:22

## 2018-11-03 RX ADMIN — METHYLPREDNISOLONE SODIUM SUCCINATE 20 MG: 40 INJECTION, POWDER, FOR SOLUTION INTRAMUSCULAR; INTRAVENOUS at 01:00

## 2018-11-03 RX ADMIN — ALPRAZOLAM 2 MG: 0.5 TABLET ORAL at 21:40

## 2018-11-03 RX ADMIN — IPRATROPIUM BROMIDE 0.5 MG: 0.5 SOLUTION RESPIRATORY (INHALATION) at 07:39

## 2018-11-03 RX ADMIN — IPRATROPIUM BROMIDE 0.5 MG: 0.5 SOLUTION RESPIRATORY (INHALATION) at 16:06

## 2018-11-03 RX ADMIN — HYDROCODONE POLISTIREX AND CHLORPHENIRAMINE POLISTIREX 5 ML: 10; 8 SUSPENSION, EXTENDED RELEASE ORAL at 10:35

## 2018-11-03 RX ADMIN — INSULIN LISPRO 3 UNITS: 100 INJECTION, SOLUTION INTRAVENOUS; SUBCUTANEOUS at 21:45

## 2018-11-03 RX ADMIN — ASPIRIN 81 MG 81 MG: 81 TABLET ORAL at 08:44

## 2018-11-03 RX ADMIN — AMLODIPINE BESYLATE 5 MG: 5 TABLET ORAL at 08:44

## 2018-11-03 RX ADMIN — BENZONATATE 100 MG: 100 CAPSULE ORAL at 08:44

## 2018-11-03 RX ADMIN — FLUTICASONE FUROATE AND VILANTEROL TRIFENATATE 1 PUFF: 200; 25 POWDER RESPIRATORY (INHALATION) at 08:41

## 2018-11-03 RX ADMIN — LISINOPRIL 20 MG: 20 TABLET ORAL at 08:44

## 2018-11-03 RX ADMIN — METOPROLOL TARTRATE 25 MG: 25 TABLET ORAL at 08:43

## 2018-11-03 RX ADMIN — ATORVASTATIN CALCIUM 20 MG: 20 TABLET, FILM COATED ORAL at 16:37

## 2018-11-03 NOTE — PROGRESS NOTES
Progress Note Bo Mark 1960, 60 y o  male MRN: [id number]    Unit/Bed#: [facility] Encounter: [id number]    Primary Care Provider: Dee Dee Vera MD   Date and time admitted to hospital: 10/31/2018  8:49 AM        * COPD with exacerbation ([facility] [address] )   Assessment & Plan    · Patient presenting with SOB, cough, hypoxia  · Hx severe restrictive lung disease with FEV1 of 2 1L or 41% Predicted  · Received loading dose of IV solumedrol in ED   · Stopped mucinex given painful coughing  · Pulmonary recs appreciated  · Cont atrovent/xopenex neb q4 and q2 PRN  · Cough suppression with tessalon perles and tussionex, toradol for pleuritic and chest wall pain  · Cont IV solumedrol at current dose today  · Restart Azithromycin for antiinflammatory properties     Acute on chronic respiratory failure with hypoxia (HCC)   Assessment & Plan    · Hx COPD on 2L NC at night  · Admitted with hypoxia, requiring NC  From COPD exac, PNA  · Required BIPAP overnight  · Currently on 5L NC for O2 sats >92%, wean as tolerated  · Will need home O2 eval prior to DC     Pneumonia   Assessment & Plan    · Pt presenting with cough and subjective fevers  CAP vs viral PNA with atelectasis   · Initial CXR showed NAD, CTA chest negative for PNA  · Repeat CXR showed Bibasilar infiltrates and/or atelectasis  · FLu/RSV PCR negative  · resp pathogen profile negative  · Cont toradol PRN pleuritic chest pain  · Cont Zithro for anti-inflammatory properties     Sepsis (HCC)   Assessment & Plan    · A/e/b leukocytosis, tachycardia, tachypnea     · CXR showed NAD  · CTA PE negative for PE or PNA  · Repeat CXR showed infiltrates, source is PNA  · LA elevated at 2 8, normalized  · Workup as noted above     Benign essential hypertension   Assessment & Plan    · BP initially elevated on admission  · Cont home meds, BP improved     SHAVONNE (obstructive sleep apnea)   Assessment & Plan    · Cont CPAP at [facility]  · Will need home O2 eval prior to DC     [facility], [facility]   Assessment & Plan    Lab Results   Component Value Date    HGBA1C 7 6 (H) 10/29/2018       Recent Labs      18   1101  18   1548  18   2102  18   0729   POCGLU  441*  307*  214*  292*       Blood Sugar Average: Last 72 hrs:  (P) 340 [id number]     Cont home lantus, lispro  Hold PO meds  Cont ISS, increase TIDAC insulin for hyperglycemia     Bipolar affective disorder (HCC)   Assessment & Plan    · Cont home meds: effexor, seroquel, remeron, cariprazine, buspar, xanax           VTE Pharmacologic Prophylaxis:   Pharmacologic: Enoxaparin (Lovenox)  Mechanical VTE Prophylaxis in Place: Yes    Patient Centered Rounds: I have performed bedside rounds with nursing staff today  Discussions with Specialists or Other Care Team Provider: Yes    Education and Discussions with Family / Patient:Yes    Time Spent for Care: 30 minutes  More than 50% of total time spent on counseling and coordination of care as described above  Current Length of Stay: 3 day(s)    Current Patient Status: Inpatient     Discharge Plan: pending    Code Status: Level 1 - Full Code      Subjective:   SOB improving  Feels very congested and wants to cough phlegm up but when he coughs he has terrible pain so hes trying not to  Pain medications helping  Denies diarrhea  Objective:       Vitals:   Temp (24hrs), Av °F (36 7 °C), Min:98 °F (36 7 °C), Max:98 °F (36 7 °C)    Temp:  [98 °F (36 7 °C)] 98 °F (36 7 °C)  HR:  [82-91] 89  Resp:  [16-29] 18  BP: (121-142)/(63-81) 121/81  SpO2:  [90 %-96 %] 92 %  Body mass index is 40 45 kg/m²  Input and Output Summary (last 24 hours):        Intake/Output Summary (Last 24 hours) at 18 0930  Last data filed at 18 1301   Gross per 24 hour   Intake              720 ml   Output                0 ml   Net              720 ml       Physical Exam:     Physical Exam   Constitutional: He is oriented to person, place, and time  He appears well-developed  Obese    HENT:   Head: Normocephalic and atraumatic  Cardiovascular: Normal rate, regular rhythm and normal heart sounds  No murmur heard  Pulmonary/Chest: No respiratory distress  He has no wheezes  He has no rales  Work of breathing improving  +upper airway congestion, course rhonchi, good air entry   Abdominal: Soft  Bowel sounds are normal  He exhibits no distension  There is no tenderness  There is no rebound and no guarding  Musculoskeletal: He exhibits edema (trace)  He exhibits no tenderness or deformity  Neurological: He is alert and oriented to person, place, and time  Skin: Skin is warm and dry  Psychiatric: He has a normal mood and affect  His behavior is normal    Nursing note and vitals reviewed  Additional Data:     Labs:      Results from last 7 days  Lab Units 11/03/18  0617 11/02/18  0626   WBC Thousand/uL 10 21* 13 77*   HEMOGLOBIN g/dL 13 5 13 4   HEMATOCRIT % 40 1 40 8   PLATELETS Thousands/uL 162 169   NEUTROS PCT %  --  77*   LYMPHS PCT %  --  17   MONOS PCT %  --  5   EOS PCT %  --  0       Results from last 7 days  Lab Units 11/03/18  0617  10/31/18  0926   POTASSIUM mmol/L 4 7  < > 4 3   CHLORIDE mmol/L 96*  < > 100   CO2 mmol/L 25  < > 23   BUN mg/dL 30*  < > 24   CREATININE mg/dL 0 88  < > 1 10   CALCIUM mg/dL 9 0  < > 9 9   ALK PHOS U/L  --   --  62   ALT U/L  --   --  55   AST U/L  --   --  32   < > = values in this interval not displayed  Results from last 7 days  Lab Units 10/31/18  0926   INR  0 98       * I Have Reviewed All Lab Data Listed Above  * Additional Pertinent Lab Tests Reviewed: [address] Admission Reviewed    Imaging:  Xr Chest Portable    Result Date: 11/1/2018  Narrative: CHEST INDICATION:   progressive hypoxemia  COMPARISON:  10/31/2018 EXAM PERFORMED/VIEWS:  XR CHEST PORTABLE 1 image FINDINGS: Cardiomediastinal silhouette appears enlarged  No evidence of heart failure   Bibasilar infiltrates and/or atelectasis  Cannot exclude small pleural effusions  Osseous structures appear within normal limits for patient age  Impression: Bibasilar infiltrates and/or atelectasis  Workstation performed: [id number]     Xr Chest 1 View Portable    Result Date: 10/31/2018  Narrative: CHEST INDICATION:   SOB  Shortness of breath COMPARISON:  11/06/2017 EXAM PERFORMED/VIEWS:  XR CHEST PORTABLE 1 image FINDINGS: Cardiomediastinal silhouette appears enlarged  The pulmonary vessels are normal  Scarring at the right lung base  No infiltrates  Osseous structures appear within normal limits for patient age  Impression: Cardiomegaly  Scarring at the right lung base  Workstation performed: [id number]     Cta Ed Chest Pe Study    Result Date: 10/31/2018  Narrative: CTA - CHEST WITH IV CONTRAST - PULMONARY ANGIOGRAM INDICATION:   pleuritic chest pain  COMPARISON: None  TECHNIQUE: CTA examination of the chest was performed using angiographic technique according to a protocol specifically tailored to evaluate for pulmonary embolism  Axial, sagittal, and coronal 2D reformatted images were created from the source data and  submitted for interpretation  In addition, coronal 3D MIP postprocessing was performed on the acquisition scanner  Radiation dose length product (DLP) for this visit:  791 61 mGy-cm   This examination, like all CT scans performed in the [facility], was performed utilizing techniques to minimize radiation dose exposure, including the use of iterative  reconstruction and automated exposure control  IV Contrast:  100 mL of iohexol (OMNIPAQUE)  FINDINGS: PULMONARY ARTERIAL TREE:  No pulmonary embolus is seen  LUNGS:  Lungs are clear  There is no tracheal or endobronchial lesion  PLEURA:  Unremarkable  HEART/GREAT VESSELS:  Cardiomegaly evident  MEDIASTINUM AND CHRISTOPHER:  Unremarkable  CHEST WALL AND LOWER NECK:   Unremarkable   VISUALIZED STRUCTURES IN THE UPPER ABDOMEN:  Enlarged fatty liver evident  Status post cholecystectomy  Fatty replaced pancreas  OSSEOUS STRUCTURES:  No acute fracture or destructive osseous lesion  Impression: No PE  Workstation performed: [id number]     Imaging Reports Reviewed by myself    Cultures:   Blood Culture:   Lab Results   Component Value Date    BLOODCX No Growth at 48 hrs  10/31/2018    BLOODCX No Growth at 48 hrs  10/31/2018    BLOODCX No Growth After 5 Days  09/06/2017    BLOODCX No Growth After 5 Days  09/06/2017    BLOODCX Diphtheroids 03/20/2017    BLOODCX No Growth After 5 Days  03/20/2017    BLOODCX No Growth After 5 Days  01/12/2016    BLOODCX No Growth After 5 Days   01/12/2016     Urine Culture:   Lab Results   Component Value Date    URINECX 1665-7210 cfu/ml Mixed Contaminants X2 03/20/2017    URINECX No Growth <1000 cfu/mL 11/27/2016    URINECX No Growth <1000 cfu/mL 09/02/2016     Sputum Culture: No components found for: SPUTUMCX  Wound Culture: No results found for: WOUNDCULT    Last 24 Hours Medication List:     Current Facility-Administered Medications:  acetaminophen 650 mg Oral Q6H PRN Nora Bunch MD   ALPRAZolam 2 mg Oral HS PRN Nora Bunch MD   amLODIPine 5 mg Oral Q12H [address] [facility]   aspirin 81 mg Oral QAM Nora Bunch MD   atorvastatin 20 mg Oral Daily With Alin Martino MD   benzonatate 200 mg Oral TID Nora Bunch MD   busPIRone 15 mg Oral BID Nora Bunch MD   calcium carbonate 1,000 mg Oral Daily PRN Nora Bunch MD   Dapagliflozin Propanediol 10 mg Oral [address], MD   ergocalciferol 50,000 Units Oral Once per day on Mon Thu Nora Bunch MD   fluticasone-vilanterol 1 puff Inhalation Daily Nora Bunch MD   HYDROcodone-acetaminophen 1 tablet Oral Q6H PRN Roger Andrews DO   hydrocodone-chlorpheniramine polistirex 5 mL Oral Q12H PRN Nora Bunch MD   ibuprofen 400 mg Oral Q6H PRN Nora Bunch MD   insulin glargine 60 Units Subcutaneous HS Nora Bunch MD   insulin lispro 1-5 Units Subcutaneous HS Nora Bunch MD insulin lispro 12 Units Subcutaneous TID With Meals Shane Li MD   insulin lispro 4-20 Units Subcutaneous TID AC Shane Li MD   ipratropium 0 5 mg Nebulization Q4H Shane Li MD   ketorolac 15 mg Intravenous [facility] Aanhi Multani PA-C   levalbuterol 0 63 mg Nebulization Q2H PRN Shane Li MD   levalbuterol 1 25 mg Nebulization Q4H Shane Li MD   lisinopril 20 mg Oral Daily Shane Li MD   methylPREDNISolone sodium succinate 20 mg Intravenous Q8H Anahi Multani PA-C   metoprolol tartrate 25 mg Oral Q12H [address] [facility], [place]   mirtazapine 45 mg Oral HS Shane Li MD   ondansetron 4 mg Intravenous Q6H PRN Shane Li MD   pantoprazole 40 mg Oral Early Morning Shane Li MD   pregabalin 50 mg Oral TID Shane Li MD   QUEtiapine 400 mg Oral HS Shane Li MD   venlafaxine 150 mg Oral Obey Mirza MD        Today, Patient Was Seen By: Shane Li MD    ** Please Note: Dragon 360 Dictation voice to text software may have been used in the creation of this document   **

## 2018-11-03 NOTE — PLAN OF CARE
DISCHARGE PLANNING     Discharge to home or other facility with appropriate resources Progressing        DISCHARGE PLANNING - CARE MANAGEMENT     Discharge to post-acute care or home with appropriate resources Progressing        INFECTION - ADULT     Absence or prevention of progression during hospitalization Progressing     Absence of fever/infection during neutropenic period Progressing        PAIN - ADULT     Verbalizes/displays adequate comfort level or baseline comfort level Progressing        Potential for Falls     Patient will remain free of falls Progressing        RESPIRATORY - ADULT     Achieves optimal ventilation and oxygenation Progressing        SAFETY ADULT     Patient will remain free of falls Progressing     Maintain or return to baseline ADL function Progressing     Maintain or return mobility status to optimal level Progressing

## 2018-11-03 NOTE — PROGRESS NOTES
Progress Note - Pulmonary   Collette Offer 60 y o  male MRN: [id number]  Unit/Bed#: [address] [place] [address] Encounter: [id number]    Assessment:  Acute exacerbation of COPD - slight improvement  Right chest wall costochondritis and area of ecchymoses right anterior chest wall secondary to his harsh cough  SHAVONNE -     Plan:  Continue Solumedrol 20 mg IV every 8 hours  Resume azithromycin for acute bronchitis and complete 5 days of therapy, nasal respiratory pathogen panel negative  Bipap 12/6 with 35% oxygen at bedtime  At home he uses CPAP at 10 cm H2O with 2 l/m of oxygen  He wants to continue Bipap here in the hospital  Antitussive agents as ordered    Subjective:   Patient feels somewhat better today  Still with mostly nonproductive and right chest wall soreness    Objective:     Vitals: Blood pressure 153/70, pulse 82, temperature 97 7 °F (36 5 °C), temperature source Oral, resp  rate 20, height 5' 11" (1 803 m), weight 132 kg (290 lb), SpO2 92 %  ,Body mass index is 40 45 kg/m²  Intake/Output Summary (Last 24 hours) at 11/03/18 1902  Last data filed at 11/03/18 1801   Gross per 24 hour   Intake             2560 ml   Output              700 ml   Net             1860 ml       Physical Exam: Physical Exam   Constitutional: He is oriented to person, place, and time  He appears well-developed and well-nourished  No distress  HENT:   Head: Normocephalic  Nose: Nose normal    Mouth/Throat: Oropharynx is clear and moist  No oropharyngeal exudate  Eyes: Pupils are equal, round, and reactive to light  Conjunctivae are normal    Neck: Neck supple  No JVD present  No tracheal deviation present  Cardiovascular: Normal rate, regular rhythm and normal heart sounds  Pulmonary/Chest: Effort normal    There are wheezes in lower lobes,    Area of ecchymoses right anterior chest same in size   Abdominal: Soft  He exhibits no distension  There is no tenderness  There is no guarding  Musculoskeletal: He exhibits no edema  Lymphadenopathy:     He has no cervical adenopathy  Neurological: He is alert and oriented to person, place, and time  Skin: Skin is warm and dry  No rash noted  Psychiatric: He has a normal mood and affect  His behavior is normal  Thought content normal         Labs: I have personally reviewed pertinent lab results  , ABG: Lab Results   Component Value Date    PHART 7 422 03/22/2017    OKJ1LSO 35 7 (L) 03/22/2017    PO2ART 77 7 03/22/2017    HHW5YPK 22 7 03/22/2017    BEART -1 2 03/22/2017    SOURCE Brachial, Left 03/22/2017   , BNP: No results found for: BNP, CBC: Lab Results   Component Value Date    WBC 10 21 (H) 11/03/2018    HGB 13 5 11/03/2018    HCT 40 1 11/03/2018    MCV 94 11/03/2018     11/03/2018    ADJUSTEDWBC 8 60 09/14/2016    MCH 31 5 11/03/2018    MCHC 33 7 11/03/2018    RDW 12 4 11/03/2018    MPV 10 1 11/03/2018    NRBC 0 11/02/2018   , CMP: Lab Results   Component Value Date    K 4 7 11/03/2018    CL 96 (L) 11/03/2018    CO2 25 11/03/2018    BUN 30 (H) 11/03/2018    CREATININE 0 88 11/03/2018    CALCIUM 9 0 11/03/2018    AST 32 10/31/2018    ALT 55 10/31/2018    ALKPHOS 62 10/31/2018    EGFR 94 11/03/2018   , PT/INR:   Lab Results   Component Value Date    INR 0 98 10/31/2018   , Troponin: No results found for: TROPONIN    Imaging and other studies: I have personally reviewed pertinent reports     and I have personally reviewed pertinent films in PACS

## 2018-11-04 LAB
ANION GAP SERPL CALCULATED.3IONS-SCNC: 10 MMOL/L (ref 4–13)
BUN SERPL-MCNC: 33 MG/DL (ref 5–25)
CALCIUM SERPL-MCNC: 9 MG/DL (ref 8.3–10.1)
CHLORIDE SERPL-SCNC: 96 MMOL/L (ref 100–108)
CO2 SERPL-SCNC: 26 MMOL/L (ref 21–32)
CREAT SERPL-MCNC: 0.94 MG/DL (ref 0.6–1.3)
ERYTHROCYTE [DISTWIDTH] IN BLOOD BY AUTOMATED COUNT: 12.2 % (ref 11.6–15.1)
GFR SERPL CREATININE-BSD FRML MDRD: 88 ML/MIN/1.73SQ M
GLUCOSE SERPL-MCNC: 255 MG/DL (ref 65–140)
GLUCOSE SERPL-MCNC: 256 MG/DL (ref 65–140)
GLUCOSE SERPL-MCNC: 287 MG/DL (ref 65–140)
GLUCOSE SERPL-MCNC: 294 MG/DL (ref 65–140)
GLUCOSE SERPL-MCNC: 350 MG/DL (ref 65–140)
HCT VFR BLD AUTO: 40.8 % (ref 36.5–49.3)
HGB BLD-MCNC: 13.5 G/DL (ref 12–17)
MCH RBC QN AUTO: 30.8 PG (ref 26.8–34.3)
MCHC RBC AUTO-ENTMCNC: 33.1 G/DL (ref 31.4–37.4)
MCV RBC AUTO: 93 FL (ref 82–98)
PLATELET # BLD AUTO: 159 THOUSANDS/UL (ref 149–390)
PMV BLD AUTO: 10.3 FL (ref 8.9–12.7)
POTASSIUM SERPL-SCNC: 4.7 MMOL/L (ref 3.5–5.3)
RBC # BLD AUTO: 4.38 MILLION/UL (ref 3.88–5.62)
SODIUM SERPL-SCNC: 132 MMOL/L (ref 136–145)
WBC # BLD AUTO: 9.38 THOUSAND/UL (ref 4.31–10.16)

## 2018-11-04 PROCEDURE — 97162 PT EVAL MOD COMPLEX 30 MIN: CPT

## 2018-11-04 PROCEDURE — 82948 REAGENT STRIP/BLOOD GLUCOSE: CPT

## 2018-11-04 PROCEDURE — 99232 SBSQ HOSP IP/OBS MODERATE 35: CPT | Performed by: INTERNAL MEDICINE

## 2018-11-04 PROCEDURE — 97110 THERAPEUTIC EXERCISES: CPT

## 2018-11-04 PROCEDURE — 80048 BASIC METABOLIC PNL TOTAL CA: CPT | Performed by: STUDENT IN AN ORGANIZED HEALTH CARE EDUCATION/TRAINING PROGRAM

## 2018-11-04 PROCEDURE — 94640 AIRWAY INHALATION TREATMENT: CPT

## 2018-11-04 PROCEDURE — 99232 SBSQ HOSP IP/OBS MODERATE 35: CPT | Performed by: STUDENT IN AN ORGANIZED HEALTH CARE EDUCATION/TRAINING PROGRAM

## 2018-11-04 PROCEDURE — G8979 MOBILITY GOAL STATUS: HCPCS

## 2018-11-04 PROCEDURE — G8978 MOBILITY CURRENT STATUS: HCPCS

## 2018-11-04 PROCEDURE — 94660 CPAP INITIATION&MGMT: CPT

## 2018-11-04 PROCEDURE — 94668 MNPJ CHEST WALL SBSQ: CPT

## 2018-11-04 PROCEDURE — 94760 N-INVAS EAR/PLS OXIMETRY 1: CPT

## 2018-11-04 PROCEDURE — 94664 DEMO&/EVAL PT USE INHALER: CPT

## 2018-11-04 PROCEDURE — 85027 COMPLETE CBC AUTOMATED: CPT | Performed by: STUDENT IN AN ORGANIZED HEALTH CARE EDUCATION/TRAINING PROGRAM

## 2018-11-04 RX ADMIN — IPRATROPIUM BROMIDE 0.5 MG: 0.5 SOLUTION RESPIRATORY (INHALATION) at 19:58

## 2018-11-04 RX ADMIN — AZITHROMYCIN 500 MG: 250 TABLET, FILM COATED ORAL at 14:14

## 2018-11-04 RX ADMIN — ALPRAZOLAM 2 MG: 0.5 TABLET ORAL at 21:38

## 2018-11-04 RX ADMIN — QUETIAPINE FUMARATE 400 MG: 200 TABLET, EXTENDED RELEASE ORAL at 21:39

## 2018-11-04 RX ADMIN — MIRTAZAPINE 45 MG: 15 TABLET, FILM COATED ORAL at 21:26

## 2018-11-04 RX ADMIN — HYDROCODONE BITARTRATE AND ACETAMINOPHEN 1 TABLET: 5; 325 TABLET ORAL at 12:08

## 2018-11-04 RX ADMIN — IBUPROFEN 400 MG: 400 TABLET ORAL at 09:10

## 2018-11-04 RX ADMIN — METOPROLOL TARTRATE 25 MG: 25 TABLET ORAL at 09:12

## 2018-11-04 RX ADMIN — LEVALBUTEROL HYDROCHLORIDE 1.25 MG: 1.25 SOLUTION, CONCENTRATE RESPIRATORY (INHALATION) at 19:58

## 2018-11-04 RX ADMIN — LISINOPRIL 20 MG: 20 TABLET ORAL at 09:11

## 2018-11-04 RX ADMIN — INSULIN LISPRO 2 UNITS: 100 INJECTION, SOLUTION INTRAVENOUS; SUBCUTANEOUS at 21:39

## 2018-11-04 RX ADMIN — KETOROLAC TROMETHAMINE 15 MG: 30 INJECTION, SOLUTION INTRAMUSCULAR at 21:27

## 2018-11-04 RX ADMIN — IPRATROPIUM BROMIDE 0.5 MG: 0.5 SOLUTION RESPIRATORY (INHALATION) at 08:33

## 2018-11-04 RX ADMIN — LEVALBUTEROL HYDROCHLORIDE 1.25 MG: 1.25 SOLUTION, CONCENTRATE RESPIRATORY (INHALATION) at 23:29

## 2018-11-04 RX ADMIN — LEVALBUTEROL HYDROCHLORIDE 1.25 MG: 1.25 SOLUTION, CONCENTRATE RESPIRATORY (INHALATION) at 11:26

## 2018-11-04 RX ADMIN — IPRATROPIUM BROMIDE 0.5 MG: 0.5 SOLUTION RESPIRATORY (INHALATION) at 11:26

## 2018-11-04 RX ADMIN — ASPIRIN 81 MG 81 MG: 81 TABLET ORAL at 09:11

## 2018-11-04 RX ADMIN — IPRATROPIUM BROMIDE 0.5 MG: 0.5 SOLUTION RESPIRATORY (INHALATION) at 00:25

## 2018-11-04 RX ADMIN — AMLODIPINE BESYLATE 5 MG: 5 TABLET ORAL at 21:26

## 2018-11-04 RX ADMIN — HYDROCODONE BITARTRATE AND ACETAMINOPHEN 1 TABLET: 5; 325 TABLET ORAL at 06:05

## 2018-11-04 RX ADMIN — LEVALBUTEROL HYDROCHLORIDE 1.25 MG: 1.25 SOLUTION, CONCENTRATE RESPIRATORY (INHALATION) at 08:33

## 2018-11-04 RX ADMIN — LEVALBUTEROL HYDROCHLORIDE 1.25 MG: 1.25 SOLUTION, CONCENTRATE RESPIRATORY (INHALATION) at 03:37

## 2018-11-04 RX ADMIN — BENZONATATE 200 MG: 100 CAPSULE ORAL at 16:55

## 2018-11-04 RX ADMIN — METHYLPREDNISOLONE SODIUM SUCCINATE 20 MG: 40 INJECTION, POWDER, FOR SOLUTION INTRAMUSCULAR; INTRAVENOUS at 09:14

## 2018-11-04 RX ADMIN — BUSPIRONE HYDROCHLORIDE 15 MG: 10 TABLET ORAL at 16:59

## 2018-11-04 RX ADMIN — PREGABALIN 50 MG: 50 CAPSULE ORAL at 16:55

## 2018-11-04 RX ADMIN — METHYLPREDNISOLONE SODIUM SUCCINATE 20 MG: 40 INJECTION, POWDER, FOR SOLUTION INTRAMUSCULAR; INTRAVENOUS at 01:53

## 2018-11-04 RX ADMIN — IPRATROPIUM BROMIDE 0.5 MG: 0.5 SOLUTION RESPIRATORY (INHALATION) at 23:29

## 2018-11-04 RX ADMIN — AMLODIPINE BESYLATE 5 MG: 5 TABLET ORAL at 09:11

## 2018-11-04 RX ADMIN — METHYLPREDNISOLONE SODIUM SUCCINATE 20 MG: 40 INJECTION, POWDER, FOR SOLUTION INTRAMUSCULAR; INTRAVENOUS at 16:56

## 2018-11-04 RX ADMIN — LEVALBUTEROL HYDROCHLORIDE 1.25 MG: 1.25 SOLUTION, CONCENTRATE RESPIRATORY (INHALATION) at 15:18

## 2018-11-04 RX ADMIN — IPRATROPIUM BROMIDE 0.5 MG: 0.5 SOLUTION RESPIRATORY (INHALATION) at 15:18

## 2018-11-04 RX ADMIN — HYDROCODONE BITARTRATE AND ACETAMINOPHEN 1 TABLET: 5; 325 TABLET ORAL at 18:21

## 2018-11-04 RX ADMIN — INSULIN LISPRO 16 UNITS: 100 INJECTION, SOLUTION INTRAVENOUS; SUBCUTANEOUS at 12:09

## 2018-11-04 RX ADMIN — INSULIN LISPRO 3 UNITS: 100 INJECTION, SOLUTION INTRAVENOUS; SUBCUTANEOUS at 16:58

## 2018-11-04 RX ADMIN — KETOROLAC TROMETHAMINE 15 MG: 30 INJECTION, SOLUTION INTRAMUSCULAR at 05:18

## 2018-11-04 RX ADMIN — FLUTICASONE FUROATE AND VILANTEROL TRIFENATATE 1 PUFF: 200; 25 POWDER RESPIRATORY (INHALATION) at 09:11

## 2018-11-04 RX ADMIN — HYDROCODONE POLISTIREX AND CHLORPHENIRAMINE POLISTIREX 5 ML: 10; 8 SUSPENSION, EXTENDED RELEASE ORAL at 10:57

## 2018-11-04 RX ADMIN — IPRATROPIUM BROMIDE 0.5 MG: 0.5 SOLUTION RESPIRATORY (INHALATION) at 03:37

## 2018-11-04 RX ADMIN — ATORVASTATIN CALCIUM 20 MG: 20 TABLET, FILM COATED ORAL at 16:54

## 2018-11-04 RX ADMIN — VENLAFAXINE HYDROCHLORIDE 150 MG: 150 CAPSULE, EXTENDED RELEASE ORAL at 09:12

## 2018-11-04 RX ADMIN — PANTOPRAZOLE SODIUM 40 MG: 40 TABLET, DELAYED RELEASE ORAL at 05:18

## 2018-11-04 RX ADMIN — INSULIN LISPRO 12 UNITS: 100 INJECTION, SOLUTION INTRAVENOUS; SUBCUTANEOUS at 09:15

## 2018-11-04 RX ADMIN — BENZONATATE 200 MG: 100 CAPSULE ORAL at 21:26

## 2018-11-04 RX ADMIN — PREGABALIN 50 MG: 50 CAPSULE ORAL at 09:11

## 2018-11-04 RX ADMIN — KETOROLAC TROMETHAMINE 15 MG: 30 INJECTION, SOLUTION INTRAMUSCULAR at 14:14

## 2018-11-04 RX ADMIN — PREGABALIN 50 MG: 50 CAPSULE ORAL at 21:26

## 2018-11-04 RX ADMIN — LEVALBUTEROL HYDROCHLORIDE 1.25 MG: 1.25 SOLUTION, CONCENTRATE RESPIRATORY (INHALATION) at 00:25

## 2018-11-04 RX ADMIN — INSULIN GLARGINE 60 UNITS: 100 INJECTION, SOLUTION SUBCUTANEOUS at 21:40

## 2018-11-04 RX ADMIN — BENZONATATE 200 MG: 100 CAPSULE ORAL at 09:11

## 2018-11-04 RX ADMIN — BUSPIRONE HYDROCHLORIDE 15 MG: 10 TABLET ORAL at 09:12

## 2018-11-04 RX ADMIN — METOPROLOL TARTRATE 25 MG: 25 TABLET ORAL at 21:26

## 2018-11-04 NOTE — PROGRESS NOTES
Progress Note Judith Rio 1960, 60 y o  male MRN: [id number]    Unit/Bed#: [facility] Encounter: [id number]    Primary Care Provider: Niru Guadalupe MD   Date and time admitted to hospital: 10/31/2018  8:49 AM        * COPD with exacerbation ([facility] )   Assessment & Plan    · Patient presenting with SOB, cough, hypoxia  · Hx severe restrictive lung disease with FEV1 of 2 1L or 41% Predicted  · Received loading dose of IV solumedrol in ED   · Stopped mucinex given painful coughing  · Pulmonary recs appreciated  · Cont atrovent/xopenex neb q4 and q2 PRN  · Cough suppression with tessalon perles and tussionex, toradol for pleuritic and chest wall pain  · Cont IV solumedrol, wean as tolerated  · Azithromycin x 5 days  · Patient slow to improve     Acute on chronic respiratory failure with hypoxia (HCC)   Assessment & Plan    · Hx COPD on 2L NC at night  · Admitted with hypoxia, requiring NC  From COPD exac, PNA  · Required BIPAP overnight  · Cont NC for O2 sats >92%, wean as tolerated  · Will need home O2 eval prior to DC     Pneumonia   Assessment & Plan    · Pt presenting with cough and subjective fevers  CAP vs viral PNA with atelectasis   · Initial CXR showed NAD, CTA chest negative for PNA  · Repeat CXR showed Bibasilar infiltrates and/or atelectasis  · FLu/RSV PCR negative  · resp pathogen profile negative  · Cont toradol PRN pleuritic chest pain  · Cont Zithro, 5 day course     Sepsis ([facility] )   Assessment & Plan    · A/e/b leukocytosis, tachycardia, tachypnea     · CXR showed NAD  · CTA PE negative for PE or PNA  · Repeat CXR showed infiltrates, source is PNA  · LA elevated at 2 8, normalized  · Workup as noted above     Benign essential hypertension   Assessment & Plan    · BP initially elevated on admission  · Cont home meds, BP improved     SHAVONNE (obstructive sleep apnea)   Assessment & Plan    · Cont CPAP at [facility]  · Will need home O2 eval prior to DC     Diabetes mellitus type 2, uncontrolled ([facility] ) Assessment & Plan    Lab Results   Component Value Date    HGBA1C 7 6 (H) 10/29/2018       Recent Labs      18   1101  18   1548  18   2102  18   0729   POCGLU  441*  307*  214*  292*       Blood Sugar Average: Last 72 hrs:  (P) 340 [id number]     Cont home lantus, lispro  Hold PO meds  Cont ISS, increased TIDAC insulin for hyperglycemia     Bipolar affective disorder (HCC)   Assessment & Plan    · Cont home meds: effexor, seroquel, remeron, cariprazine, buspar, xanax           VTE Pharmacologic Prophylaxis:   Pharmacologic: Enoxaparin (Lovenox)  Mechanical VTE Prophylaxis in Place: Yes    Patient Centered Rounds: I have performed bedside rounds with nursing staff today  Discussions with Specialists or Other Care Team Provider: Yes    Education and Discussions with Family / Patient:Yes    Time Spent for Care: 30 minutes  More than 50% of total time spent on counseling and coordination of care as described above  Current Length of Stay: 4 day(s)    Current Patient Status: Inpatient     Discharge Plan: pending    Code Status: Level 1 - Full Code      Subjective:   Slow daily improvement  SOB is better daily  Still has a painful cough but also starting to feel congestion breaking up  Happy with care care he is getting and complimenting staff  Objective:       Vitals:   Temp (24hrs), Av 2 °F (36 8 °C), Min:97 5 °F (36 4 °C), Max:99 3 °F (37 4 °C)    Temp:  [97 5 °F (36 4 °C)-99 3 °F (37 4 °C)] 97 5 °F (36 4 °C)  HR:  [66-84] 84  Resp:  [20] 20  BP: (150-160)/(70) 150/70  SpO2:  [91 %-96 %] 94 %  Body mass index is 40 45 kg/m²  Input and Output Summary (last 24 hours): Intake/Output Summary (Last 24 hours) at 18 0943  Last data filed at 18 0601   Gross per 24 hour   Intake             1720 ml   Output             1350 ml   Net              370 ml       Physical Exam:     Physical Exam   Constitutional: He is oriented to person, place, and time   He appears well-developed  Obese    HENT:   Head: Normocephalic and atraumatic  Cardiovascular: Normal rate, regular rhythm and normal heart sounds  No murmur heard  Pulmonary/Chest: No respiratory distress  He has no wheezes  He has no rales  +upper airway congestion, course rhonchi   Abdominal: Soft  Bowel sounds are normal  He exhibits no distension  There is no tenderness  There is no rebound and no guarding  Musculoskeletal: He exhibits edema (trace)  He exhibits no tenderness or deformity  Neurological: He is alert and oriented to person, place, and time  Skin: Skin is warm and dry  Psychiatric: He has a normal mood and affect  His behavior is normal    Nursing note and vitals reviewed  Additional Data:     Labs:      Results from last 7 days  Lab Units 11/04/18  0632  11/02/18  0626   WBC Thousand/uL 9 38  < > 13 77*   HEMOGLOBIN g/dL 13 5  < > 13 4   HEMATOCRIT % 40 8  < > 40 8   PLATELETS Thousands/uL 159  < > 169   NEUTROS PCT %  --   --  77*   LYMPHS PCT %  --   --  17   MONOS PCT %  --   --  5   EOS PCT %  --   --  0   < > = values in this interval not displayed  Results from last 7 days  Lab Units 11/04/18  0632  10/31/18  0926   POTASSIUM mmol/L 4 7  < > 4 3   CHLORIDE mmol/L 96*  < > 100   CO2 mmol/L 26  < > 23   BUN mg/dL 33*  < > 24   CREATININE mg/dL 0 94  < > 1 10   CALCIUM mg/dL 9 0  < > 9 9   ALK PHOS U/L  --   --  62   ALT U/L  --   --  55   AST U/L  --   --  32   < > = values in this interval not displayed  Results from last 7 days  Lab Units 10/31/18  0926   INR  0 98       * I Have Reviewed All Lab Data Listed Above  * Additional Pertinent Lab Tests Reviewed: [facility] 65 Admission Reviewed    Imaging:  Xr Chest Portable    Result Date: 11/1/2018  Narrative: CHEST INDICATION:   progressive hypoxemia  COMPARISON:  10/31/2018 EXAM PERFORMED/VIEWS:  XR CHEST PORTABLE 1 image FINDINGS: Cardiomediastinal silhouette appears enlarged    No evidence of heart failure  Bibasilar infiltrates and/or atelectasis  Cannot exclude small pleural effusions  Osseous structures appear within normal limits for patient age  Impression: Bibasilar infiltrates and/or atelectasis  Workstation performed: [id number]     Xr Chest 1 View Portable    Result Date: 10/31/2018  Narrative: CHEST INDICATION:   SOB  Shortness of breath COMPARISON:  11/06/2017 EXAM PERFORMED/VIEWS:  XR CHEST PORTABLE 1 image FINDINGS: Cardiomediastinal silhouette appears enlarged  The pulmonary vessels are normal  Scarring at the right lung base  No infiltrates  Osseous structures appear within normal limits for patient age  Impression: Cardiomegaly  Scarring at the right lung base  Workstation performed: [id number]     Cta Ed Chest Pe Study    Result Date: 10/31/2018  Narrative: CTA - CHEST WITH IV CONTRAST - PULMONARY ANGIOGRAM INDICATION:   pleuritic chest pain  COMPARISON: None  TECHNIQUE: CTA examination of the chest was performed using angiographic technique according to a protocol specifically tailored to evaluate for pulmonary embolism  Axial, sagittal, and coronal 2D reformatted images were created from the source data and  submitted for interpretation  In addition, coronal 3D MIP postprocessing was performed on the acquisition scanner  Radiation dose length product (DLP) for this visit:  791 61 mGy-cm   This examination, like all CT scans performed in the [facility], was performed utilizing techniques to minimize radiation dose exposure, including the use of iterative  reconstruction and automated exposure control  IV Contrast:  100 mL of iohexol (OMNIPAQUE)  FINDINGS: PULMONARY ARTERIAL TREE:  No pulmonary embolus is seen  LUNGS:  Lungs are clear  There is no tracheal or endobronchial lesion  PLEURA:  Unremarkable  HEART/GREAT VESSELS:  Cardiomegaly evident  MEDIASTINUM AND CHRISTOPHER:  Unremarkable  CHEST WALL AND LOWER NECK:   Unremarkable   VISUALIZED STRUCTURES IN THE UPPER ABDOMEN:  Enlarged fatty liver evident  Status post cholecystectomy  Fatty replaced pancreas  OSSEOUS STRUCTURES:  No acute fracture or destructive osseous lesion  Impression: No PE  Workstation performed: [id number]     Imaging Reports Reviewed by myself    Cultures:   Blood Culture:   Lab Results   Component Value Date    BLOODCX No Growth at 72 hrs  10/31/2018    BLOODCX No Growth at 72 hrs  10/31/2018    BLOODCX No Growth After 5 Days  09/06/2017    BLOODCX No Growth After 5 Days  09/06/2017    BLOODCX Diphtheroids 03/20/2017    BLOODCX No Growth After 5 Days  03/20/2017    BLOODCX No Growth After 5 Days  01/12/2016    BLOODCX No Growth After 5 Days   01/12/2016     Urine Culture:   Lab Results   Component Value Date    URINECX 8176-3848 cfu/ml Mixed Contaminants X2 03/20/2017    URINECX No Growth <1000 cfu/mL 11/27/2016    URINECX No Growth <1000 cfu/mL 09/02/2016     Sputum Culture: No components found for: SPUTUMCX  Wound Culture: No results found for: WOUNDCULT    Last 24 Hours Medication List:     Current Facility-Administered Medications:  acetaminophen 650 mg Oral Q6H PRN Dianna Issa MD   ALPRAZolam 2 mg Oral HS PRN Dianna Issa MD   amLODIPine 5 mg Oral Q12H [address] [facility], MD   aspirin 81 mg Oral QAM Dianna Issa MD   atorvastatin 20 mg Oral Daily [facility]   azithromycin 500 mg Oral Q24H Dianna Issa MD   benzonatate 200 mg Oral TID Dianna Issa MD   busPIRone 15 mg Oral BID Dianna Issa MD   calcium carbonate 1,000 mg Oral Daily PRN Dianna Issa MD   Dapagliflozin Propanediol 10 mg Oral QAM Dianna Issa MD   ergocalciferol 50,000 Units Oral Once per day on Mon Thu Dianna Issa MD   fluticasone-vilanterol 1 puff Inhalation Daily Dianna Issa MD   HYDROcodone-acetaminophen 1 tablet Oral Q6H PRN Prince Perry DO   hydrocodone-chlorpheniramine polistirex 5 mL Oral Q12H PRN Dianna Issa MD   ibuprofen 400 mg Oral Q6H PRN Dianna Issa MD   insulin glargine 60 Units Subcutaneous HS Constantin Alba MD   insulin lispro 1-5 Units Subcutaneous HS Constantin Alba MD   insulin lispro 17 Units Subcutaneous TID With Meals Constantin Alba MD   insulin lispro 4-20 Units Subcutaneous TID AC Constantin Alba MD   ipratropium 0 5 mg Nebulization Q4H Constantin Alba MD   ketorolac 15 mg Intravenous [facility] Shelle Friday, PA-C   levalbuterol 0 63 mg Nebulization Q2H PRN Constantin Alba MD   levalbuterol 1 25 mg Nebulization Q4H Constantin Alba MD   lisinopril 20 mg Oral Daily Constantin Alba MD   methylPREDNISolone sodium succinate 20 mg Intravenous Q8H Shelle Friday, PA-C   metoprolol tartrate 25 mg Oral Q12H [facility], MD   mirtazapine 45 mg Oral HS Constantin Alba MD   ondansetron 4 mg Intravenous Q6H PRN Constantin Alba MD   pantoprazole 40 mg Oral Early Morning Constantin Alba MD   pregabalin 50 mg Oral TID Constantin Alba MD   QUEtiapine 400 mg Oral HS Constantin Alba MD   venlafaxine 150 mg Oral Millicent Church MD        Today, Patient Was Seen By: Constantin Alba MD    ** Please Note: Dragon 360 Dictation voice to text software may have been used in the creation of this document   **

## 2018-11-04 NOTE — PHYSICAL THERAPY NOTE
PT EVALUATION       11/04/18 1335   Note Type   Note type Eval/Treat   Pain Assessment   Pain Assessment No/denies pain   Home Living   Type of [address] level  (full flight to enter)   Home Equipment (49)   Prior Function   Level of [place] Independent with ADLs and functional mobility   Lives With (roommate)   ADL Assistance Independent   IADLs Independent   Falls in the last 6 months 0   Restrictions/Precautions   Other Precautions (02)   General   Additional Pertinent History Pt admitted with COPD exacerbation now improving  Pt notes bruising on his chest from coughing so hard  Cognition   Overall Cognitive Status WFL   RLE Assessment   RLE Assessment WFL   LLE Assessment   LLE Assessment WFL   Transfers   Sit to Stand 5  Supervision   Stand to Sit 5  Supervision   Stand pivot 5  Supervision   Ambulation/Elevation   Gait pattern (slow christina, guarded)   Gait Assistance 5  Supervision   Assistive Device (6L02 follow)   Distance 50 feet   Balance   Static Sitting Good   Dynamic Sitting Fair   Static Standing Fair   Dynamic Standing Fair -   Endurance Deficit   Endurance Deficit (Sp02 95% on 6L02, HR 92 bpm after activity)   Activity Tolerance   Activity Tolerance Patient limited by fatigue   Assessment   Prognosis Good   Problem List Decreased strength;Decreased endurance; Impaired balance;Decreased mobility;Obesity   Assessment Patient seen for Physical Therapy evaluation  Patient admitted with COPD with exacerbation ([facility] )  Comorbidities affecting patient's physical performance include: COPD, bipolar affective disorder, DM, back pain, htn, neruopathy  Personal factors affecting patient at time of initial evaluation include: stairs to enter home and inability to navigate community distances  Prior to admission, patient was independent with functional mobility without assistive device and independent with ADLS    Please find objective findings from Physical Therapy assessment regarding body systems outlined above with impairments and limitations including impaired balance, decreased endurance, decreased activity tolerance, decreased functional mobility tolerance and SOB upon exertion  The Barthel Index was used as a functional outcome tool presenting with a score of 60 today indicating moderate limitations of functional mobility and ADLS  Patient's clinical presentation is currently evolving as seen in patient's presentation of increased fall risk, new onset of impairment of functional mobility, decreased endurance and new onset of weakness  Pt would benefit from continued Physical Therapy treatment to address deficits as defined above and maximize level of functional mobility  As demonstrated by objective findings, the assigned level of complexity for this evaluation is moderate  Goals   Patient Goals "I want to start moving again"   STG Expiration Date 11/11/18   Short Term Goal #1 independent bed mobility, independent transfers, supervision ambulation 100 feet with minimal SOB, supervision up and down 4 steps   LTG Expiration Date 11/18/18   Long Term Goal #1 independent ambulation 200 feet outdoor surfaces with normal gait pattern, independent up and down 10 steps so pt can enter/exit his apartment  Treatment Day 1   Plan   Treatment/Interventions ADL retraining;Functional transfer training;LE strengthening/ROM; Elevations; Therapeutic exercise;Gait training;Patient/family training;Equipment eval/education; Endurance training   PT Frequency 5x/wk   Recommendation   Recommendation (home )   Equipment Recommended (pt has 02)   Barthel Index   Feeding 10   Bathing 0   Grooming Score 5   Dressing Score 5   Bladder Score 10   Bowels Score 10   Toilet Use Score 5   Transfers (Bed/Chair) Score 15   Mobility (Level Surface) Score 0   Stairs Score 0   Barthel Index Score 61   Licensure   [place] License Number  [place] GOODWIN [id number]     Time In:1320  Time YTP:7261  Total Time: 15      S:  "I want to walk again, this is the first time I've walked in a few days"  O:  Supervision ambulation with 6L02 x 50 feet  Sp02 94% HR 92 bpm after activity  A:  Gait is steady, but slow and guarded    P:  Continue PT    Olivia Juárez, PT  [id number]

## 2018-11-05 LAB
ANION GAP SERPL CALCULATED.3IONS-SCNC: 9 MMOL/L (ref 4–13)
BACTERIA BLD CULT: NORMAL
BACTERIA BLD CULT: NORMAL
BUN SERPL-MCNC: 31 MG/DL (ref 5–25)
CALCIUM SERPL-MCNC: 9.6 MG/DL (ref 8.3–10.1)
CHLORIDE SERPL-SCNC: 96 MMOL/L (ref 100–108)
CO2 SERPL-SCNC: 26 MMOL/L (ref 21–32)
CREAT SERPL-MCNC: 0.87 MG/DL (ref 0.6–1.3)
ERYTHROCYTE [DISTWIDTH] IN BLOOD BY AUTOMATED COUNT: 12.1 % (ref 11.6–15.1)
GFR SERPL CREATININE-BSD FRML MDRD: 94 ML/MIN/1.73SQ M
GLUCOSE SERPL-MCNC: 274 MG/DL (ref 65–140)
GLUCOSE SERPL-MCNC: 275 MG/DL (ref 65–140)
GLUCOSE SERPL-MCNC: 283 MG/DL (ref 65–140)
GLUCOSE SERPL-MCNC: 286 MG/DL (ref 65–140)
GLUCOSE SERPL-MCNC: 313 MG/DL (ref 65–140)
HCT VFR BLD AUTO: 44.2 % (ref 36.5–49.3)
HGB BLD-MCNC: 14.6 G/DL (ref 12–17)
MCH RBC QN AUTO: 30.9 PG (ref 26.8–34.3)
MCHC RBC AUTO-ENTMCNC: 33 G/DL (ref 31.4–37.4)
MCV RBC AUTO: 94 FL (ref 82–98)
PLATELET # BLD AUTO: 176 THOUSANDS/UL (ref 149–390)
PMV BLD AUTO: 10.3 FL (ref 8.9–12.7)
POTASSIUM SERPL-SCNC: 5.4 MMOL/L (ref 3.5–5.3)
RBC # BLD AUTO: 4.72 MILLION/UL (ref 3.88–5.62)
SODIUM SERPL-SCNC: 131 MMOL/L (ref 136–145)
WBC # BLD AUTO: 11.68 THOUSAND/UL (ref 4.31–10.16)

## 2018-11-05 PROCEDURE — 85027 COMPLETE CBC AUTOMATED: CPT | Performed by: STUDENT IN AN ORGANIZED HEALTH CARE EDUCATION/TRAINING PROGRAM

## 2018-11-05 PROCEDURE — 97110 THERAPEUTIC EXERCISES: CPT

## 2018-11-05 PROCEDURE — 94640 AIRWAY INHALATION TREATMENT: CPT

## 2018-11-05 PROCEDURE — 99232 SBSQ HOSP IP/OBS MODERATE 35: CPT | Performed by: STUDENT IN AN ORGANIZED HEALTH CARE EDUCATION/TRAINING PROGRAM

## 2018-11-05 PROCEDURE — G8988 SELF CARE GOAL STATUS: HCPCS

## 2018-11-05 PROCEDURE — 94668 MNPJ CHEST WALL SBSQ: CPT

## 2018-11-05 PROCEDURE — 97167 OT EVAL HIGH COMPLEX 60 MIN: CPT

## 2018-11-05 PROCEDURE — 94669 MECHANICAL CHEST WALL OSCILL: CPT

## 2018-11-05 PROCEDURE — 80048 BASIC METABOLIC PNL TOTAL CA: CPT | Performed by: STUDENT IN AN ORGANIZED HEALTH CARE EDUCATION/TRAINING PROGRAM

## 2018-11-05 PROCEDURE — G8987 SELF CARE CURRENT STATUS: HCPCS

## 2018-11-05 PROCEDURE — 82948 REAGENT STRIP/BLOOD GLUCOSE: CPT

## 2018-11-05 PROCEDURE — 99232 SBSQ HOSP IP/OBS MODERATE 35: CPT | Performed by: INTERNAL MEDICINE

## 2018-11-05 PROCEDURE — 94760 N-INVAS EAR/PLS OXIMETRY 1: CPT

## 2018-11-05 RX ORDER — ACETYLCYSTEINE 200 MG/ML
3 SOLUTION ORAL; RESPIRATORY (INHALATION)
Status: DISCONTINUED | OUTPATIENT
Start: 2018-11-05 | End: 2018-11-07 | Stop reason: HOSPADM

## 2018-11-05 RX ORDER — METHYLPREDNISOLONE SODIUM SUCCINATE 40 MG/ML
20 INJECTION, POWDER, LYOPHILIZED, FOR SOLUTION INTRAMUSCULAR; INTRAVENOUS EVERY 12 HOURS SCHEDULED
Status: DISCONTINUED | OUTPATIENT
Start: 2018-11-05 | End: 2018-11-07

## 2018-11-05 RX ORDER — KETOROLAC TROMETHAMINE 30 MG/ML
15 INJECTION, SOLUTION INTRAMUSCULAR; INTRAVENOUS EVERY 12 HOURS PRN
Status: DISPENSED | OUTPATIENT
Start: 2018-11-05 | End: 2018-11-06

## 2018-11-05 RX ADMIN — KETOROLAC TROMETHAMINE 15 MG: 30 INJECTION, SOLUTION INTRAMUSCULAR at 05:26

## 2018-11-05 RX ADMIN — INSULIN GLARGINE 60 UNITS: 100 INJECTION, SOLUTION SUBCUTANEOUS at 22:03

## 2018-11-05 RX ADMIN — IPRATROPIUM BROMIDE 0.5 MG: 0.5 SOLUTION RESPIRATORY (INHALATION) at 08:45

## 2018-11-05 RX ADMIN — LEVALBUTEROL HYDROCHLORIDE 1.25 MG: 1.25 SOLUTION, CONCENTRATE RESPIRATORY (INHALATION) at 08:45

## 2018-11-05 RX ADMIN — ACETYLCYSTEINE 600 MG: 200 SOLUTION ORAL; RESPIRATORY (INHALATION) at 12:13

## 2018-11-05 RX ADMIN — LEVALBUTEROL HYDROCHLORIDE 1.25 MG: 1.25 SOLUTION, CONCENTRATE RESPIRATORY (INHALATION) at 12:10

## 2018-11-05 RX ADMIN — PANTOPRAZOLE SODIUM 40 MG: 40 TABLET, DELAYED RELEASE ORAL at 05:46

## 2018-11-05 RX ADMIN — BENZONATATE 200 MG: 100 CAPSULE ORAL at 22:02

## 2018-11-05 RX ADMIN — KETOROLAC TROMETHAMINE 15 MG: 30 INJECTION, SOLUTION INTRAMUSCULAR at 23:15

## 2018-11-05 RX ADMIN — METOPROLOL TARTRATE 25 MG: 25 TABLET ORAL at 22:03

## 2018-11-05 RX ADMIN — LEVALBUTEROL HYDROCHLORIDE 0.63 MG: 0.63 SOLUTION RESPIRATORY (INHALATION) at 18:18

## 2018-11-05 RX ADMIN — FLUTICASONE FUROATE AND VILANTEROL TRIFENATATE 1 PUFF: 200; 25 POWDER RESPIRATORY (INHALATION) at 09:33

## 2018-11-05 RX ADMIN — METHYLPREDNISOLONE SODIUM SUCCINATE 20 MG: 40 INJECTION, POWDER, FOR SOLUTION INTRAMUSCULAR; INTRAVENOUS at 11:04

## 2018-11-05 RX ADMIN — BUSPIRONE HYDROCHLORIDE 15 MG: 10 TABLET ORAL at 17:02

## 2018-11-05 RX ADMIN — MIRTAZAPINE 45 MG: 15 TABLET, FILM COATED ORAL at 22:02

## 2018-11-05 RX ADMIN — BUSPIRONE HYDROCHLORIDE 15 MG: 10 TABLET ORAL at 09:31

## 2018-11-05 RX ADMIN — PREGABALIN 50 MG: 50 CAPSULE ORAL at 16:13

## 2018-11-05 RX ADMIN — LISINOPRIL 20 MG: 20 TABLET ORAL at 09:31

## 2018-11-05 RX ADMIN — ASPIRIN 81 MG 81 MG: 81 TABLET ORAL at 09:32

## 2018-11-05 RX ADMIN — HYDROCODONE BITARTRATE AND ACETAMINOPHEN 1 TABLET: 5; 325 TABLET ORAL at 09:31

## 2018-11-05 RX ADMIN — METOPROLOL TARTRATE 25 MG: 25 TABLET ORAL at 11:04

## 2018-11-05 RX ADMIN — HYDROCODONE BITARTRATE AND ACETAMINOPHEN 1 TABLET: 5; 325 TABLET ORAL at 17:02

## 2018-11-05 RX ADMIN — IPRATROPIUM BROMIDE 0.5 MG: 0.5 SOLUTION RESPIRATORY (INHALATION) at 20:16

## 2018-11-05 RX ADMIN — AMLODIPINE BESYLATE 5 MG: 5 TABLET ORAL at 09:32

## 2018-11-05 RX ADMIN — PREGABALIN 50 MG: 50 CAPSULE ORAL at 22:03

## 2018-11-05 RX ADMIN — VENLAFAXINE HYDROCHLORIDE 150 MG: 150 CAPSULE, EXTENDED RELEASE ORAL at 09:32

## 2018-11-05 RX ADMIN — INSULIN LISPRO 5 UNITS: 100 INJECTION, SOLUTION INTRAVENOUS; SUBCUTANEOUS at 14:45

## 2018-11-05 RX ADMIN — AMLODIPINE BESYLATE 5 MG: 5 TABLET ORAL at 22:03

## 2018-11-05 RX ADMIN — QUETIAPINE FUMARATE 400 MG: 200 TABLET, EXTENDED RELEASE ORAL at 22:02

## 2018-11-05 RX ADMIN — INSULIN LISPRO 3 UNITS: 100 INJECTION, SOLUTION INTRAVENOUS; SUBCUTANEOUS at 22:28

## 2018-11-05 RX ADMIN — INSULIN LISPRO 4 UNITS: 100 INJECTION, SOLUTION INTRAVENOUS; SUBCUTANEOUS at 16:54

## 2018-11-05 RX ADMIN — INSULIN LISPRO 4 UNITS: 100 INJECTION, SOLUTION INTRAVENOUS; SUBCUTANEOUS at 09:00

## 2018-11-05 RX ADMIN — IPRATROPIUM BROMIDE 0.5 MG: 0.5 SOLUTION RESPIRATORY (INHALATION) at 03:53

## 2018-11-05 RX ADMIN — IPRATROPIUM BROMIDE 0.5 MG: 0.5 SOLUTION RESPIRATORY (INHALATION) at 12:10

## 2018-11-05 RX ADMIN — PREGABALIN 50 MG: 50 CAPSULE ORAL at 09:31

## 2018-11-05 RX ADMIN — BENZONATATE 200 MG: 100 CAPSULE ORAL at 16:13

## 2018-11-05 RX ADMIN — ACETYLCYSTEINE 600 MG: 200 SOLUTION ORAL; RESPIRATORY (INHALATION) at 16:11

## 2018-11-05 RX ADMIN — ALPRAZOLAM 2 MG: 0.5 TABLET ORAL at 22:02

## 2018-11-05 RX ADMIN — ERGOCALCIFEROL 50000 UNITS: 1.25 CAPSULE ORAL at 09:32

## 2018-11-05 RX ADMIN — IPRATROPIUM BROMIDE 0.5 MG: 0.5 SOLUTION RESPIRATORY (INHALATION) at 16:11

## 2018-11-05 RX ADMIN — ATORVASTATIN CALCIUM 20 MG: 20 TABLET, FILM COATED ORAL at 16:13

## 2018-11-05 RX ADMIN — METHYLPREDNISOLONE SODIUM SUCCINATE 20 MG: 40 INJECTION, POWDER, FOR SOLUTION INTRAMUSCULAR; INTRAVENOUS at 00:45

## 2018-11-05 RX ADMIN — BENZONATATE 200 MG: 100 CAPSULE ORAL at 09:31

## 2018-11-05 RX ADMIN — LEVALBUTEROL HYDROCHLORIDE 1.25 MG: 1.25 SOLUTION, CONCENTRATE RESPIRATORY (INHALATION) at 16:11

## 2018-11-05 RX ADMIN — LEVALBUTEROL HYDROCHLORIDE 1.25 MG: 1.25 SOLUTION, CONCENTRATE RESPIRATORY (INHALATION) at 03:53

## 2018-11-05 RX ADMIN — LEVALBUTEROL HYDROCHLORIDE 1.25 MG: 1.25 SOLUTION, CONCENTRATE RESPIRATORY (INHALATION) at 20:16

## 2018-11-05 RX ADMIN — METHYLPREDNISOLONE SODIUM SUCCINATE 20 MG: 40 INJECTION, POWDER, FOR SOLUTION INTRAMUSCULAR; INTRAVENOUS at 22:03

## 2018-11-05 NOTE — PROGRESS NOTES
Progress Note Emily Robles 1960, 60 y o  male MRN: [id number]    Unit/Bed#: [facility] Encounter: [id number]    Primary Care Provider: Percy Martell MD   Date and time admitted to hospital: 10/31/2018  8:49 AM        * COPD with exacerbation ([facility] )   Assessment & Plan    · Patient presenting with SOB, cough, hypoxia  · Hx severe restrictive lung disease with FEV1 of 2 1L or 41% Predicted  · Received loading dose of IV solumedrol in ED   · Stopped mucinex given painful coughing  · Pulmonary recs appreciated  · Cont atrovent/xopenex neb q4 and q2 PRN  · Cough suppression with tessalon perles and tussionex, toradol for pleuritic and chest wall pain  · Started of chest PT  · Cont IV solumedrol, wean as tolerated  · Azithromycin x 5 days completed today  · Patient slow to improve     Acute on chronic respiratory failure with hypoxia (HCC)   Assessment & Plan    · Hx COPD on 2L NC at night  · Admitted with hypoxia, requiring NC  From COPD exac, PNA  · Required BIPAP overnight  · Cont NC for O2 sats >92%, wean as tolerated  · Will need home O2 eval prior to DC     Pneumonia   Assessment & Plan    · Pt presenting with cough and subjective fevers  CAP vs viral PNA with atelectasis   · Initial CXR showed NAD, CTA chest negative for PNA  · Repeat CXR showed Bibasilar infiltrates and/or atelectasis  · FLu/RSV PCR negative  · resp pathogen profile negative  · Cont toradol PRN pleuritic chest pain  · Completed 5 day course of azithromycin     Sepsis ([facility] )   Assessment & Plan    · RESOLVED  A/e/b leukocytosis, tachycardia, tachypnea     · CXR showed NAD  · CTA PE negative for PE or PNA  · Repeat CXR showed infiltrates, source is PNA  · LA elevated at 2 8, normalized  · Workup as noted above     Benign essential hypertension   Assessment & Plan    · BP initially elevated on admission  · Cont home meds, BP improved     SHAVONNE (obstructive sleep apnea)   Assessment & Plan    · Cont CPAP at [facility]  · Will need home O2 eval prior to DC     Diabetes mellitus type 2, uncontrolled ([facility] [address] )   Assessment & Plan    Lab Results   Component Value Date    HGBA1C 7 6 (H) 10/29/2018       Recent Labs      18   1101  18   1548  18   2102  18   0729   POCGLU  441*  307*  214*  292*       Blood Sugar Average: Last 72 hrs:  (P) 340 [id number]     Cont home lantus, lispro  Hold PO meds  Cont ISS, increased TIDAC insulin for hyperglycemia     Bipolar affective disorder (HCC)   Assessment & Plan    · Cont home meds: effexor, seroquel, remeron, cariprazine, buspar, xanax           VTE Pharmacologic Prophylaxis:   Pharmacologic: Enoxaparin (Lovenox)  Mechanical VTE Prophylaxis in Place: Yes    Patient Centered Rounds: I have performed bedside rounds with nursing staff today  Discussions with Specialists or Other Care Team Provider: Yes    Education and Discussions with Family / Patient:Yes    Time Spent for Care: 30 minutes  More than 50% of total time spent on counseling and coordination of care as described above  Current Length of Stay: 5 day(s)    Current Patient Status: Inpatient     Discharge Plan: pending    Code Status: Level 1 - Full Code      Subjective:   Still has unrelenting and painful cough  SOB improving, still has it with exertion  Started on chest PT which is helping clear his phlegm  Objective:       Vitals:   Temp (24hrs), Av °F (36 7 °C), Min:97 8 °F (36 6 °C), Max:98 3 °F (36 8 °C)    Temp:  [97 8 °F (36 6 °C)-98 3 °F (36 8 °C)] 98 3 °F (36 8 °C)  HR:  [67-72] 72  Resp:  [18] 18  BP: (172-186)/(79-86) 180/79  SpO2:  [91 %-96 %] 96 %  Body mass index is 40 45 kg/m²  Input and Output Summary (last 24 hours):        Intake/Output Summary (Last 24 hours) at 18 1057  Last data filed at 18 1421   Gross per 24 hour   Intake                0 ml   Output             1200 ml   Net            -1200 ml       Physical Exam:     Physical Exam   Constitutional: He is oriented to person, place, and time  He appears well-developed  Obese    HENT:   Head: Normocephalic and atraumatic  Cardiovascular: Normal rate, regular rhythm and normal heart sounds  No murmur heard  Pulmonary/Chest: No respiratory distress  He has no wheezes  He has no rales  Decreased breath sounds with coarse rhonchi   Abdominal: Soft  Bowel sounds are normal  He exhibits no distension  There is no tenderness  There is no rebound and no guarding  Musculoskeletal: He exhibits edema (trace)  He exhibits no tenderness or deformity  Neurological: He is alert and oriented to person, place, and time  Skin: Skin is warm and dry  Psychiatric: He has a normal mood and affect  His behavior is normal    Nursing note and vitals reviewed  Additional Data:     Labs:      Results from last 7 days  Lab Units 11/05/18 0544  11/02/18  0626   WBC Thousand/uL 11 68*  < > 13 77*   HEMOGLOBIN g/dL 14 6  < > 13 4   HEMATOCRIT % 44 2  < > 40 8   PLATELETS Thousands/uL 176  < > 169   NEUTROS PCT %  --   --  77*   LYMPHS PCT %  --   --  17   MONOS PCT %  --   --  5   EOS PCT %  --   --  0   < > = values in this interval not displayed  Results from last 7 days  Lab Units 11/05/18  0544  10/31/18  0926   POTASSIUM mmol/L 5 4*  < > 4 3   CHLORIDE mmol/L 96*  < > 100   CO2 mmol/L 26  < > 23   BUN mg/dL 31*  < > 24   CREATININE mg/dL 0 87  < > 1 10   CALCIUM mg/dL 9 6  < > 9 9   ALK PHOS U/L  --   --  62   ALT U/L  --   --  55   AST U/L  --   --  32   < > = values in this interval not displayed  Results from last 7 days  Lab Units 10/31/18  0926   INR  0 98       * I Have Reviewed All Lab Data Listed Above  * Additional Pertinent Lab Tests Reviewed: Samirlayla 66 Admission Reviewed    Imaging:  Xr Chest Portable    Result Date: 11/1/2018  Narrative: CHEST INDICATION:   progressive hypoxemia   COMPARISON:  10/31/2018 EXAM PERFORMED/VIEWS:  XR CHEST PORTABLE 1 image FINDINGS: Cardiomediastinal silhouette appears enlarged  No evidence of heart failure  Bibasilar infiltrates and/or atelectasis  Cannot exclude small pleural effusions  Osseous structures appear within normal limits for patient age  Impression: Bibasilar infiltrates and/or atelectasis  Workstation performed: [id number]     Xr Chest 1 View Portable    Result Date: 10/31/2018  Narrative: CHEST INDICATION:   SOB  Shortness of breath COMPARISON:  11/06/2017 EXAM PERFORMED/VIEWS:  XR CHEST PORTABLE 1 image FINDINGS: Cardiomediastinal silhouette appears enlarged  The pulmonary vessels are normal  Scarring at the right lung base  No infiltrates  Osseous structures appear within normal limits for patient age  Impression: Cardiomegaly  Scarring at the right lung base  Workstation performed: [id number]     Cta Ed Chest Pe Study    Result Date: 10/31/2018  Narrative: CTA - CHEST WITH IV CONTRAST - PULMONARY ANGIOGRAM INDICATION:   pleuritic chest pain  COMPARISON: None  TECHNIQUE: CTA examination of the chest was performed using angiographic technique according to a protocol specifically tailored to evaluate for pulmonary embolism  Axial, sagittal, and coronal 2D reformatted images were created from the source data and  submitted for interpretation  In addition, coronal 3D MIP postprocessing was performed on the acquisition scanner  Radiation dose length product (DLP) for this visit:  791 61 mGy-cm   This examination, like all CT scans performed in the [facility], was performed utilizing techniques to minimize radiation dose exposure, including the use of iterative  reconstruction and automated exposure control  IV Contrast:  100 mL of iohexol (OMNIPAQUE)  FINDINGS: PULMONARY ARTERIAL TREE:  No pulmonary embolus is seen  LUNGS:  Lungs are clear  There is no tracheal or endobronchial lesion  PLEURA:  Unremarkable  HEART/GREAT VESSELS:  Cardiomegaly evident  MEDIASTINUM AND CHRISTOPHER:  Unremarkable  CHEST WALL AND LOWER NECK:   Unremarkable  VISUALIZED STRUCTURES IN THE UPPER ABDOMEN:  Enlarged fatty liver evident  Status post cholecystectomy  Fatty replaced pancreas  OSSEOUS STRUCTURES:  No acute fracture or destructive osseous lesion  Impression: No PE  Workstation performed: [id number]     Imaging Reports Reviewed by myself    Cultures:   Blood Culture:   Lab Results   Component Value Date    BLOODCX No Growth After 4 Days  10/31/2018    BLOODCX No Growth After 4 Days  10/31/2018    BLOODCX No Growth After 5 Days  09/06/2017    BLOODCX No Growth After 5 Days  09/06/2017    BLOODCX Diphtheroids 03/20/2017    BLOODCX No Growth After 5 Days  03/20/2017    BLOODCX No Growth After 5 Days  01/12/2016    BLOODCX No Growth After 5 Days   01/12/2016     Urine Culture:   Lab Results   Component Value Date    URINECX 0694-5071 cfu/ml Mixed Contaminants X2 03/20/2017    URINECX No Growth <1000 cfu/mL 11/27/2016    URINECX No Growth <1000 cfu/mL 09/02/2016     Sputum Culture: No components found for: SPUTUMCX  Wound Culture: No results found for: WOUNDCULT    Last 24 Hours Medication List:     Current Facility-Administered Medications:  acetaminophen 650 mg Oral Q6H PRN Nora Bunch MD   ALPRAZolam 2 mg Oral HS PRN Nora Bunch MD   amLODIPine 5 mg Oral Q12H [facility], MD   aspirin 81 mg Oral QAM Nora Bunch MD   atorvastatin 20 mg Oral Daily With [facility], MD   benzonatate 200 mg Oral TID Nora Bunch MD   busPIRone 15 mg Oral BID Nora Bunch MD   calcium carbonate 1,000 mg Oral Daily PRN Nora Bunch MD   Dapagliflozin Propanediol 10 mg Oral [address], MD   ergocalciferol 50,000 Units Oral Once per day on Mon Thu Nora Bunch MD   fluticasone-vilanterol 1 puff Inhalation Daily Nora Bunch MD   HYDROcodone-acetaminophen 1 tablet Oral Q6H PRN Roger Andrews DO   hydrocodone-chlorpheniramine polistirex 5 mL Oral Q12H PRN Nora Bunch MD   ibuprofen 400 mg Oral Q6H PRN Nora Bunch MD   insulin glargine 60 Units Subcutaneous HS Nora Bunch MD insulin lispro 1-5 Units Subcutaneous HS Jose Bales MD   insulin lispro 1-6 Units Subcutaneous TID AC Jose Bales MD   insulin lispro 17 Units Subcutaneous TID With Meals Jose Bales MD   ipratropium 0 5 mg Nebulization Q4H Jose Bales MD   levalbuterol 0 63 mg Nebulization Q2H PRN Jose Bales MD   levalbuterol 1 25 mg Nebulization Q4H Jose Bales MD   lisinopril 20 mg Oral Daily Jose Bales MD   methylPREDNISolone sodium succinate 20 mg Intravenous Q8H Cornishdeisy Thomas PA-C   metoprolol tartrate 25 mg Oral Q12H [facility], MD   mirtazapine 45 mg Oral HS Jose Bales MD   ondansetron 4 mg Intravenous Q6H PRN Jose Bales MD   pantoprazole 40 mg Oral Early Morning Jose Bales MD   pregabalin 50 mg Oral TID Jose Bales MD   QUEtiapine 400 mg Oral HS Jose Bales MD   venlafaxine 150 mg Oral Kp Galo MD        Today, Patient Was Seen By: Jose Bales MD    ** Please Note: Dragon 360 Dictation voice to text software may have been used in the creation of this document   **

## 2018-11-05 NOTE — PHYSICAL THERAPY NOTE
PT TREATMENT     11/05/18 4454   Pain Assessment   Pain Assessment 0-10   Pain Score 7   Pain Type Acute pain   Pain Location Chest   Restrictions/Precautions   Other Precautions O2;Fall Risk;Pain   General   Chart Reviewed Yes   Family/Caregiver Present No   Subjective   Subjective "better, but not ready to go home yet"   Transfers   Sit to Stand 5  Supervision   Stand to Sit 5  Supervision   Ambulation/Elevation   Gait pattern (slow christina)   Gait Assistance 5  Supervision   Assistive Device None   Distance  feet with change in direction x 3 reps;rest period inbetween each walk  SAO2 on 5L at rest 94% and with amb on 6L 95% each time  Pt minimally SOB with generalized fatigue/weakness with each walk  Balance   Static Sitting Good   Static Standing Fair +   Dynamic Standing Fair   Ambulatory Fair   Activity Tolerance   Activity Tolerance Patient limited by fatigue  (and generalized weakness)   Assessment   Assessment Pt is noted with improved endurance, gait and mobility  Pt minimally SOB with each amb trial and has generalized weakness and fatigue  Pt will cont to benefit from skilled PT services  Goals   Treatment Day 2   Plan   Treatment/Interventions ADL retraining;Functional transfer training;LE strengthening/ROM; Elevations; Therapeutic exercise; Endurance training;Patient/family training;Bed mobility;Gait training;Equipment eval/education   PT Frequency 5x/wk   Recommendation   Recommendation (home;home PT pending needs at [facility])   Equipment Recommended (pt has O2)   Licensure   [place] License Number  [place], [place] [id number]

## 2018-11-05 NOTE — UTILIZATION REVIEW
Continued Stay Review    Date: 11-4-18      Vital Signs: /73 (BP Location: Right arm)   Pulse 67   Temp (!) 96 3 °F (35 7 °C) (Temporal)   Resp 18   Ht 5' 11" (1 803 m)   Wt 132 kg (290 lb)   SpO2 92%   BMI 40 45 kg/m²     Medications:       acetaminophen 650 mg Oral Q6H PRN   ALPRAZolam 2 mg Oral HS PRN   amLODIPine 5 mg Oral Q12H LELAND   aspirin 81 mg Oral QAM   atorvastatin 20 mg Oral Daily With Dinner   azithromycin 500 mg Oral Q24H   benzonatate 200 mg Oral TID   busPIRone 15 mg Oral BID   calcium carbonate 1,000 mg Oral Daily PRN   Dapagliflozin Propanediol 10 mg Oral QAM   ergocalciferol 50,000 Units Oral Once per day on Mon Thu   fluticasone-vilanterol 1 puff Inhalation Daily   HYDROcodone-acetaminophen 1 tablet Oral Q6H PRN   hydrocodone-chlorpheniramine polistirex 5 mL Oral Q12H PRN   ibuprofen 400 mg Oral Q6H PRN   insulin glargine 60 Units Subcutaneous HS   insulin lispro 1-5 Units Subcutaneous HS   insulin lispro 17 Units Subcutaneous TID With Meals   insulin lispro 4-20 Units Subcutaneous TID AC   ipratropium 0 5 mg Nebulization Q4H   ketorolac 15 mg Intravenous Q8H LELAND   levalbuterol 0 63 mg Nebulization Q2H PRN   levalbuterol 1 25 mg Nebulization Q4H   lisinopril 20 mg Oral Daily   methylPREDNISolone sodium succinate 20 mg Intravenous Q8H   metoprolol tartrate 25 mg Oral Q12H LELAND   mirtazapine 45 mg Oral HS   ondansetron 4 mg Intravenous Q6H PRN   pantoprazole 40 mg Oral Early Morning   pregabalin 50 mg Oral TID   QUEtiapine 400 mg Oral HS   venlafaxine 150 mg Oral QAM             Age/Sex: 60 y o  male he has difficulty expectorate secretions even with use of the Acapella valve    Assessment/Plan:    Acute exacerbation of COPD with slight improvement  Complains of difficulty expectorating mucus  Patient not able to provide acceptable sputum specimen for culture  Nasal respiratory pathogen panel negative    Acute right costochondritis with ecchymosis right anterior chest wall due to cough   This has improved  Obstructive sleep apnea     Plan: Will order chest percussion vest to see if this helps patient mobilize any mucus secretions  Continue azithromycin p o  for total of 5 days  Continue methylprednisone 20 mg IV every 8 hr  Patient prefers to use BiPAP now 12/6 with 35% oxygen and place of his CPAP of 10 the uses with 2 L of oxygen at home  Discharge Plan:   To be determined

## 2018-11-05 NOTE — OCCUPATIONAL THERAPY NOTE
OT EVALUATION     11/05/18 1006   Note Type   Note type Eval only   Restrictions/Precautions   Other Precautions O2;Fall Risk;Pain   Pain Assessment   Pain Assessment 0-10   Pain Score 8   Pain Type Acute pain   Pain Location Chest   Home Living   Type of [address] One level;Stairs to enter with rails   Bathroom Shower/Tub Tub/shower unit   Home Equipment (O2)   Prior Function   Level of [place] Independent with ADLs and functional mobility   Lives With Friend(s)   Receives Help From Friend(s)   ADL Assistance Independent   IADLs Needs assistance   Comments Prior to hospitalization pt reports he was living in a first floor apartment with a FF of stairs up to apartment inside building  Bilateral rails  He was ambulatory without a device  he was indepednent with all self care and funcitonal mobility tasks  Utilizing a tub shower with no seat or grab bars  he had O2 at night  He did his own meal prep, med mgmt, is a   he utilizes [employer] from home and has someone do his laundry      ADL   Eating Assistance 7  Independent   Grooming Assistance 7  Independent   UB Bathing Assistance 4  Minimal Assistance   LB Bathing Assistance 4  Minimal Assistance   UB Dressing Assistance 4  Minimal Assistance   LB Dressing Assistance 4  Minimal Assistance   Toileting Assistance  4  Minimal Assistance   Bed Mobility   Supine to Sit 7  Independent   Sit to Supine 7  Independent   Transfers   Sit to Stand 5  Supervision   Stand to Sit 5  Supervision   Stand pivot 5  Supervision   Functional Mobility   Functional Mobility 5  Supervision   Additional Comments 15' in room without device no LOB + SOB   Balance   Static Sitting Good   Dynamic Sitting Fair   Static Standing Fair   Dynamic Standing Fair -   Activity Tolerance   Activity Tolerance Patient limited by fatigue   Medical Staff Made Aware Easily becomes SOB   RUE Assessment   RUE Assessment WNL   LUE Assessment   LUE Assessment WNL   Hand Function Gross Motor Coordination Functional   Fine Motor Coordination Functional   Vision-Basic Assessment   Current Vision Wears glasses all the time   Cognition   Overall Cognitive Status WFL   Arousal/Participation Alert; Responsive; Cooperative   Attention Within functional limits   Orientation Level Oriented X4   Memory Within functional limits   Following Commands Follows all commands and directions without difficulty   Assessment   Limitation Decreased ADL status; Decreased Safe judgement during ADL;Decreased endurance;Decreased self-care trans;Decreased high-level ADLs   Prognosis Good   Assessment Patient evaluated by Occupational Therapy  Patient admitted with COPD with exacerbation ([facility] [address] )  The patients occupational profile, medical and therapy history includes a extensive additional review of physical, cognitive, or psychosocial history related to current functional performance  Comorbidities affecting functional mobility and ADLS include: COPD, diabetes, hypertension, hyperlipidemia, neuropathy and obesity  Prior to admission, patient was independent with functional mobility without assistive device, independent with ADLS, requiring assist for IADLS and living alone in one story home with full flight steps to enter  The evaluation identifies the following performance deficits: impaired balance, decreased endurance, increased fall risk, new onset of impairment of functional mobility, decreased ADLS, decreased IADLS, pain and impaired judgement, that result in activity limitations and/or participation restrictions  This evaluation requires clinical decision making of high complexity, because the patient presents with comorbidites that affect occupational performance and required significant modification of tasks or assistance with consideration of multiple treatment options    The Barthel Index was used as a functional outcome tool presenting with a score of 55, indicating marked limitations of functional mobility and ADLS  Patient will benefit from skilled Occupational Therapy services to address above deficits and facilitate a safe return to prior level of function  Goals   Patient Goals I want to return to being independent   STG Time Frame (1-7 days)   Short Term Goal #1 Goals established to promote patient goal of being independent again:  Patient will increase standing tolerance to 3 minutes during ADL task to decrease assistance level and decrease fall risk; Patient will increase functional mobility to and from bathroom with most appropriate device as needed IND to increase performance with ADLS and to use a toilet; Patient will tolerate 10 minutes of UE ROM/strengthening to increase general activity tolerance and performance in ADLS/IADLS; Patient will improve functional activity tolerance to 10 minutes of sustained functional tasks to increase participation in basic self-care and decrease assistance level;  Patient will be able to to verbalize understanding and perform energy conservation/proper body mechanics during ADLS and functional mobility at least 85% of the time with min cueing to decrease signs of fatigue and increase stamina to return to prior level of function; Patient will increase dynamic sitting balance to F+ to improve the ability to sit at edge of bed or on a chair for ADLS;  Patient will increase dynamic standing balance to F to improve postural stability and decrease fall risk during standing ADLS and transfers       LTG Time Frame (8-14 days)   Long Term Goal #1 Patient will increase standing tolerance to 6 minutes during ADL task to decrease assistance level and decrease fall risk;  Patient will tolerate 15 minutes of UE ROM/strengthening to increase general activity tolerance and performance in ADLS/IADLS; Patient will improve functional activity tolerance to 15 minutes of sustained functional tasks to increase participation in basic self-care and decrease assistance level;  Patient will be able to to verbalize understanding and perform energy conservation/proper body mechanics during ADLS and functional mobility at least 95% of the time with min cueing to decrease signs of fatigue and increase stamina to return to prior level of function; Patient will increase dynamic sitting balance to G to improve the ability to sit at edge of bed or on a chair for ADLS;  Patient will increase dynamic standing balance to F+ to improve postural stability and decrease fall risk during standing ADLS and transfers  Functional Transfer Goals   Pt Will Perform All Functional Transfers (STG: IND)   ADL Goals   Pt Will Perform Bathing (STG: SPV and AD PRN LTG: IND and AD PRN)   Pt Will Perform UE Dressing (STG: SPV LTG: IND)   Pt Will Perform LE Dressing (STG: SPV and AD PRN LTG: IND and AD PRN)   Pt Will Perform Toileting (STG: SPV LTG: IND)   Plan   Treatment Interventions ADL retraining;Functional transfer training; Endurance training;Patient/family training;Equipment evaluation/education; Compensatory technique education; Energy conservation   Goal Expiration Date 11/19/18   Treatment Day 0   OT Frequency 3-5x/wk   Recommendation   OT Discharge Recommendation Home OT   Barthel Index   Feeding 10   Bathing 0   Grooming Score 5   Dressing Score 5   Bladder Score 10   Bowels Score 10   Toilet Use Score 5   Transfers (Bed/Chair) Score 10   Mobility (Level Surface) Score 0   Stairs Score 0   Barthel Index Score 54   Licensure   [place] License Number  Tyrone Ar OTR/L [id number]

## 2018-11-05 NOTE — PROGRESS NOTES
Progress Note - Pulmonary   Faisal Zavala 60 y o  male MRN: [id number]  Unit/Bed#: [address] [place] [address] Encounter: [id number]      Assessment/Plan:   -acute exacerbation COPD with acute hypoxemic respiratory insufficiency:  -patient remains on 5 L nasal cannula  -probable chronic component of obesity hypoventilation  -needs to be out of bed today  -continue chest vest percussion therapy  -will add Mucomyst for ongoing congestion  -will wean Solu-Medrol to q 12 hours today  -will provide 2 p r n  Doses of Toradol for pain  -continue flutter valve and incentive spirometer  -we nasal cannula oxygen as tolerated for oxygen saturation 92-94%  -acute right-sided chest wall pain:  -patient with continued pain, but improving somewhat  -states Toradol helps significantly, Norco was 2nd best  -advise we can give these in very sparing doses  -SHAVONNE:  -patient reports per ring BiPAP over CPAP, currently on 12/6/35%  -will perform night oxygen study and morning ABG  -patient needs to be on 2 L nasal cannula oxygen as BiPAP qualifier  -morbid obesity with BMI 40 45  -weight loss would benefit with SHAVONNE/OAHS       ______________________________________________________________________    Subjective: Pt seen and examined at bedside  Still feels congested  Feels that chest percussive therapy helped loose his congestion, but does not quite help him get up  Tele Events:     Vitals:   Temp:  [97 8 °F (36 6 °C)-98 3 °F (36 8 °C)] 98 3 °F (36 8 °C)  HR:  [67-72] 72  Resp:  [18] 18  BP: (172-186)/(79-86) 180/79  Weight (last 2 days)     None        Oxygen Therapy  SpO2: 96 %  O2 Flow Rate (L/min): 4 5 L/min        IV Infusions:       Nutrition:        Diet Orders            Start     Ordered    10/31/18 1259  Diet Campos/CHO Controlled; Consistent Carbohydrate Diet Level 2 (5 carb servings/75 grams CHO/meal)  Diet effective now     Question Answer Comment   Diet Type Campos/CHO Controlled    Campos/CHO Controlled Consistent Carbohydrate Diet Level 2 (5 carb servings/75 grams CHO/meal)    RD to adjust diet per protocol? Yes        10/31/18 1258    10/31/18 1227  Room Service  Once     Question:  Type of Service  Answer:  Room Service-Appropriate    10/31/18 1227          Ins/Outs:   I/O       11/03 0701 - 11/04 0700 11/04 0701 - 11/05 0700 11/05 0701 - 11/06 0700    P  O  2560      Total Intake(mL/kg) 2560 (19 4)      Urine (mL/kg/hr) 1350 (0 4) 1200 (0 4)     Total Output 1350 1200      Net +1210 -1200                   Lines/Drains:  Invasive Devices     Peripheral Intravenous Line            Peripheral IV 11/02/18 Left Hand 2 days                 Active medications:  Scheduled Meds:  Current Facility-Administered Medications:  acetaminophen 650 mg Oral Q6H PRN Emeterio Valdes MD   ALPRAZolam 2 mg Oral HS PRN Emeterio Valdes MD   amLODIPine 5 mg Oral Q12H 3630 Lewis Paul MD   aspirin 81 mg Oral QAM Emeterio Valdes MD   atorvastatin 20 mg Oral Daily With Alin Martino MD   benzonatate 200 mg Oral TID Emeterio Valdes MD   busPIRone 15 mg Oral BID Emeterio Valdes MD   calcium carbonate 1,000 mg Oral Daily PRN Emeterio Valdes MD   Dapagliflozin Propanediol 10 mg Oral QA Emeterio Valdes MD   ergocalciferol 50,000 Units Oral Once per day on Mon Thu Emeterio Valdes MD   fluticasone-vilanterol 1 puff Inhalation Daily Emeterio Valdes MD   HYDROcodone-acetaminophen 1 tablet Oral Q6H PRN Shanita Santana DO   hydrocodone-chlorpheniramine polistirex 5 mL Oral Q12H PRN Emeterio Valdes MD   ibuprofen 400 mg Oral Q6H PRN Emeterio Valdes MD   insulin glargine 60 Units Subcutaneous HS Emeterio Valdes MD   insulin lispro 1-5 Units Subcutaneous HS Emeterio Valdes MD   insulin lispro 1-6 Units Subcutaneous TID AC Emeterio Valdes MD   insulin lispro 17 Units Subcutaneous TID With Meals Emeterio Valdes MD   ipratropium 0 5 mg Nebulization Q4H Emeterio Valdes MD   levalbuterol 0 63 mg Nebulization Q2H PRN Emeterio Valdes MD   levalbuterol 1 25 mg Nebulization Q4H Emeterio Valdes MD   lisinopril 20 mg Oral Daily Emeterio Valdes MD   methylPREDNISolone sodium succinate 20 mg Intravenous Q8H Steph Sy PA-C   metoprolol tartrate 25 mg Oral Q12H [address] [facility] [facility], MD   mirtazapine 45 mg Oral HS Deirdredimple Snowball, MD   ondansetron 4 mg Intravenous Q6H PRN Miguel Snowball, MD   pantoprazole 40 mg Oral Early Morning Deirdredimple Snowball, MD   pregabalin 50 mg Oral TID Miguel Snowball, MD   QUEtiapine 400 mg Oral HS Deirdredimple Snowball, MD   venlafaxine 150 mg Oral QAM Maridimple Snowball, MD     PRN Meds:  acetaminophen 650 mg Q6H PRN   ALPRAZolam 2 mg HS PRN   calcium carbonate 1,000 mg Daily PRN   HYDROcodone-acetaminophen 1 tablet Q6H PRN   hydrocodone-chlorpheniramine polistirex 5 mL Q12H PRN   ibuprofen 400 mg Q6H PRN   levalbuterol 0 63 mg Q2H PRN   ondansetron 4 mg Q6H PRN     ____________________________________________________________________      Physical Exam   Constitutional: He is oriented to person, place, and time  He appears well-developed and well-nourished  No distress  Morbidly obese body habitus   HENT:   Head: Normocephalic and atraumatic  Mouth/Throat: No oropharyngeal exudate  Eyes: Pupils are equal, round, and reactive to light  No scleral icterus  Neck: Normal range of motion  No JVD present  No tracheal deviation present  Cardiovascular: Normal rate, regular rhythm, normal heart sounds and intact distal pulses  Exam reveals no gallop and no friction rub  No murmur heard  Pulmonary/Chest: No accessory muscle usage or stridor  No tachypnea  No respiratory distress  He has decreased breath sounds in the right lower field and the left lower field  He has no wheezes  He has rhonchi in the right upper field, the right middle field, the right lower field, the left upper field, the left middle field and the left lower field  He has no rales  He exhibits no tenderness     Decreased breath sounds at baseline    Rhonchi in anterior and posterior lung fields    Mild conversational dyspnea    Persistent sub xiphoid ecchymoses likely secondary to coughing and DVT prophylaxis Abdominal: Soft  He exhibits no distension  There is no tenderness  There is no rebound and no guarding  Obese abdomen   Musculoskeletal: Normal range of motion  He exhibits no edema  Neurological: He is alert and oriented to person, place, and time  Skin: Skin is warm and dry  Psychiatric: He has a normal mood and affect            ____________________________________________________________________    Labs:   CBC:   Results from last 7 days  Lab Units 11/05/18 0544 11/04/18 0632 11/03/18  0617   WBC Thousand/uL 11 68* 9 38 10 21*   HEMOGLOBIN g/dL 14 6 13 5 13 5   HEMATOCRIT % 44 2 40 8 40 1   MCV fL 94 93 94   PLATELETS Thousands/uL 176 159 162     CMP:   Results from last 7 days  Lab Units 11/05/18 0544 11/04/18 0632 11/03/18  0617  10/31/18  0926   POTASSIUM mmol/L 5 4* 4 7 4 7  < > 4 3   CHLORIDE mmol/L 96* 96* 96*  < > 100   CO2 mmol/L 26 26 25  < > 23   BUN mg/dL 31* 33* 30*  < > 24   CREATININE mg/dL 0 87 0 94 0 88  < > 1 10   CALCIUM mg/dL 9 6 9 0 9 0  < > 9 9   AST U/L  --   --   --   --  32   ALT U/L  --   --   --   --  55   ALK PHOS U/L  --   --   --   --  62   EGFR ml/min/1 73sq m 94 88 94  < > 73   < > = values in this interval not displayed  Magnesium:   Results from last 7 days  Lab Units 11/03/18  0617   MAGNESIUM mg/dL 2 2     Phosphorous:     Troponin:   Results from last 7 days  Lab Units 10/31/18  0926   TROPONIN I ng/mL <0 02     PT/INR:   Results from last 7 days  Lab Units 10/31/18  0926   PTT seconds 23*   INR  0 98     Lactic Acid:   Results from last 7 days  Lab Units 11/01/18  0212 11/01/18  0026   LACTIC ACID mmol/L 1 8 2 7*     BNP:   Results from last 7 days  Lab Units 10/31/18  0926   NT-PRO BNP pg/mL 36     TSH:     Procalcitonin: Invalid input(s): PROCALCITONIN      Imaging:   XR chest portable   Final Result by Tasha Jacobs MD (11/01 0395)      Bibasilar infiltrates and/or atelectasis              Workstation performed: [id number]         CTA ED chest PE study   Final Result by Elaina Murry MD (10/31 8257)   No PE  Workstation performed: [id number]         XR chest 1 view portable   Final Result by Shayla Stoner MD (67/60 7203)   Cardiomegaly  Scarring at the right lung base  Workstation performed: [id number]                 Micro: Lab Results   Component Value Date    BLOODCX No Growth After 4 Days  10/31/2018    BLOODCX No Growth After 4 Days  10/31/2018    BLOODCX No Growth After 5 Days  09/06/2017    URINECX 0666-0968 cfu/ml Mixed Contaminants X2 03/20/2017    URINECX No Growth <1000 cfu/mL 11/27/2016    URINECX No Growth <1000 cfu/mL 09/02/2016    SPUTUMCULTUR Test not performed  Suggest repeat specimen   11/08/2017    SPUTUMCULTUR 1+ Growth of Staphylococcus aureus 03/20/2017    SPUTUMCULTUR 1+ Growth of Mixed Respiratory Francine 03/20/2017    MRSACULTURE  10/31/2018     No Methicillin Resistant Staphlyococcus aureus (MRSA) isolated        Results from last 7 days  Lab Units 10/31/18  1447   INFLUENZA B PCR  None Detected   RSV PCR  None Detected           Code Status: Level 1 - Full Code

## 2018-11-06 PROBLEM — R07.89 CHEST WALL PAIN: Status: ACTIVE | Noted: 2018-11-06

## 2018-11-06 PROBLEM — A41.9 SEPSIS (HCC): Status: RESOLVED | Noted: 2018-10-31 | Resolved: 2018-11-06

## 2018-11-06 PROBLEM — J18.9 PNEUMONIA: Status: RESOLVED | Noted: 2017-03-20 | Resolved: 2018-11-06

## 2018-11-06 LAB
ANION GAP SERPL CALCULATED.3IONS-SCNC: 7 MMOL/L (ref 4–13)
ARTERIAL PATENCY WRIST A: YES
BASE EXCESS BLDA CALC-SCNC: 3.1 MMOL/L
BODY TEMPERATURE: 97 DEGREES FEHRENHEIT
BUN SERPL-MCNC: 31 MG/DL (ref 5–25)
CALCIUM SERPL-MCNC: 9.5 MG/DL (ref 8.3–10.1)
CHLORIDE SERPL-SCNC: 96 MMOL/L (ref 100–108)
CO2 SERPL-SCNC: 28 MMOL/L (ref 21–32)
CREAT SERPL-MCNC: 0.87 MG/DL (ref 0.6–1.3)
ERYTHROCYTE [DISTWIDTH] IN BLOOD BY AUTOMATED COUNT: 12.2 % (ref 11.6–15.1)
GFR SERPL CREATININE-BSD FRML MDRD: 94 ML/MIN/1.73SQ M
GLUCOSE SERPL-MCNC: 221 MG/DL (ref 65–140)
GLUCOSE SERPL-MCNC: 229 MG/DL (ref 65–140)
GLUCOSE SERPL-MCNC: 237 MG/DL (ref 65–140)
GLUCOSE SERPL-MCNC: 253 MG/DL (ref 65–140)
GLUCOSE SERPL-MCNC: 273 MG/DL (ref 65–140)
HCO3 BLDA-SCNC: 28.4 MMOL/L (ref 22–28)
HCT VFR BLD AUTO: 44.5 % (ref 36.5–49.3)
HGB BLD-MCNC: 15 G/DL (ref 12–17)
MCH RBC QN AUTO: 31.3 PG (ref 26.8–34.3)
MCHC RBC AUTO-ENTMCNC: 33.7 G/DL (ref 31.4–37.4)
MCV RBC AUTO: 93 FL (ref 82–98)
NASAL CANNULA: 2
O2 CT BLDA-SCNC: 18.5 ML/DL (ref 16–23)
OXYHGB MFR BLDA: 89.1 % (ref 94–97)
PCO2 BLDA: 45.8 MM HG (ref 36–44)
PCO2 TEMP ADJ BLDA: 44 MM HG (ref 36–44)
PH BLD: 7.42 [PH] (ref 7.35–7.45)
PH BLDA: 7.41 [PH] (ref 7.35–7.45)
PLATELET # BLD AUTO: 190 THOUSANDS/UL (ref 149–390)
PMV BLD AUTO: 10.4 FL (ref 8.9–12.7)
PO2 BLD: 56.7 MM HG (ref 75–129)
PO2 BLDA: 60.3 MM HG (ref 75–129)
POTASSIUM SERPL-SCNC: 5 MMOL/L (ref 3.5–5.3)
RBC # BLD AUTO: 4.8 MILLION/UL (ref 3.88–5.62)
SODIUM SERPL-SCNC: 131 MMOL/L (ref 136–145)
SPECIMEN SOURCE: ABNORMAL
WBC # BLD AUTO: 13.33 THOUSAND/UL (ref 4.31–10.16)

## 2018-11-06 PROCEDURE — 85027 COMPLETE CBC AUTOMATED: CPT | Performed by: STUDENT IN AN ORGANIZED HEALTH CARE EDUCATION/TRAINING PROGRAM

## 2018-11-06 PROCEDURE — 94669 MECHANICAL CHEST WALL OSCILL: CPT

## 2018-11-06 PROCEDURE — 94760 N-INVAS EAR/PLS OXIMETRY 1: CPT

## 2018-11-06 PROCEDURE — 82948 REAGENT STRIP/BLOOD GLUCOSE: CPT

## 2018-11-06 PROCEDURE — 97110 THERAPEUTIC EXERCISES: CPT

## 2018-11-06 PROCEDURE — 80048 BASIC METABOLIC PNL TOTAL CA: CPT | Performed by: STUDENT IN AN ORGANIZED HEALTH CARE EDUCATION/TRAINING PROGRAM

## 2018-11-06 PROCEDURE — 82805 BLOOD GASES W/O2 SATURATION: CPT | Performed by: PHYSICIAN ASSISTANT

## 2018-11-06 PROCEDURE — 94660 CPAP INITIATION&MGMT: CPT

## 2018-11-06 PROCEDURE — 94618 PULMONARY STRESS TESTING: CPT

## 2018-11-06 PROCEDURE — 99232 SBSQ HOSP IP/OBS MODERATE 35: CPT | Performed by: INTERNAL MEDICINE

## 2018-11-06 PROCEDURE — 94668 MNPJ CHEST WALL SBSQ: CPT

## 2018-11-06 PROCEDURE — 94640 AIRWAY INHALATION TREATMENT: CPT

## 2018-11-06 RX ORDER — FUROSEMIDE 10 MG/ML
20 INJECTION INTRAMUSCULAR; INTRAVENOUS
Status: COMPLETED | OUTPATIENT
Start: 2018-11-06 | End: 2018-11-07

## 2018-11-06 RX ADMIN — INSULIN LISPRO 3 UNITS: 100 INJECTION, SOLUTION INTRAVENOUS; SUBCUTANEOUS at 22:15

## 2018-11-06 RX ADMIN — ACETYLCYSTEINE 600 MG: 200 SOLUTION ORAL; RESPIRATORY (INHALATION) at 07:22

## 2018-11-06 RX ADMIN — LEVALBUTEROL HYDROCHLORIDE 1.25 MG: 1.25 SOLUTION, CONCENTRATE RESPIRATORY (INHALATION) at 00:00

## 2018-11-06 RX ADMIN — IPRATROPIUM BROMIDE 0.5 MG: 0.5 SOLUTION RESPIRATORY (INHALATION) at 23:05

## 2018-11-06 RX ADMIN — INSULIN GLARGINE 60 UNITS: 100 INJECTION, SOLUTION SUBCUTANEOUS at 22:16

## 2018-11-06 RX ADMIN — HYDROCODONE POLISTIREX AND CHLORPHENIRAMINE POLISTIREX 5 ML: 10; 8 SUSPENSION, EXTENDED RELEASE ORAL at 22:15

## 2018-11-06 RX ADMIN — METOPROLOL TARTRATE 25 MG: 25 TABLET ORAL at 22:13

## 2018-11-06 RX ADMIN — IPRATROPIUM BROMIDE 0.5 MG: 0.5 SOLUTION RESPIRATORY (INHALATION) at 03:52

## 2018-11-06 RX ADMIN — IPRATROPIUM BROMIDE 0.5 MG: 0.5 SOLUTION RESPIRATORY (INHALATION) at 19:56

## 2018-11-06 RX ADMIN — LEVALBUTEROL HYDROCHLORIDE 1.25 MG: 1.25 SOLUTION, CONCENTRATE RESPIRATORY (INHALATION) at 03:52

## 2018-11-06 RX ADMIN — ACETYLCYSTEINE 600 MG: 200 SOLUTION ORAL; RESPIRATORY (INHALATION) at 00:00

## 2018-11-06 RX ADMIN — FUROSEMIDE 20 MG: 10 INJECTION, SOLUTION INTRAMUSCULAR; INTRAVENOUS at 14:25

## 2018-11-06 RX ADMIN — BENZONATATE 200 MG: 100 CAPSULE ORAL at 16:32

## 2018-11-06 RX ADMIN — LEVALBUTEROL HYDROCHLORIDE 1.25 MG: 1.25 SOLUTION, CONCENTRATE RESPIRATORY (INHALATION) at 15:48

## 2018-11-06 RX ADMIN — FLUTICASONE FUROATE AND VILANTEROL TRIFENATATE 1 PUFF: 200; 25 POWDER RESPIRATORY (INHALATION) at 10:30

## 2018-11-06 RX ADMIN — LEVALBUTEROL HYDROCHLORIDE 0.63 MG: 0.63 SOLUTION RESPIRATORY (INHALATION) at 23:05

## 2018-11-06 RX ADMIN — METOPROLOL TARTRATE 25 MG: 25 TABLET ORAL at 10:31

## 2018-11-06 RX ADMIN — METHYLPREDNISOLONE SODIUM SUCCINATE 20 MG: 40 INJECTION, POWDER, FOR SOLUTION INTRAMUSCULAR; INTRAVENOUS at 10:31

## 2018-11-06 RX ADMIN — LEVALBUTEROL HYDROCHLORIDE 1.25 MG: 1.25 SOLUTION, CONCENTRATE RESPIRATORY (INHALATION) at 19:56

## 2018-11-06 RX ADMIN — ATORVASTATIN CALCIUM 20 MG: 20 TABLET, FILM COATED ORAL at 16:32

## 2018-11-06 RX ADMIN — HYDROCODONE BITARTRATE AND ACETAMINOPHEN 1 TABLET: 5; 325 TABLET ORAL at 16:40

## 2018-11-06 RX ADMIN — VENLAFAXINE HYDROCHLORIDE 150 MG: 150 CAPSULE, EXTENDED RELEASE ORAL at 10:32

## 2018-11-06 RX ADMIN — QUETIAPINE FUMARATE 400 MG: 200 TABLET, EXTENDED RELEASE ORAL at 22:14

## 2018-11-06 RX ADMIN — INSULIN LISPRO 3 UNITS: 100 INJECTION, SOLUTION INTRAVENOUS; SUBCUTANEOUS at 16:39

## 2018-11-06 RX ADMIN — IPRATROPIUM BROMIDE 0.5 MG: 0.5 SOLUTION RESPIRATORY (INHALATION) at 15:48

## 2018-11-06 RX ADMIN — HYDROCODONE POLISTIREX AND CHLORPHENIRAMINE POLISTIREX 5 ML: 10; 8 SUSPENSION, EXTENDED RELEASE ORAL at 07:13

## 2018-11-06 RX ADMIN — MIRTAZAPINE 45 MG: 15 TABLET, FILM COATED ORAL at 22:13

## 2018-11-06 RX ADMIN — IPRATROPIUM BROMIDE 0.5 MG: 0.5 SOLUTION RESPIRATORY (INHALATION) at 00:00

## 2018-11-06 RX ADMIN — METHYLPREDNISOLONE SODIUM SUCCINATE 20 MG: 40 INJECTION, POWDER, FOR SOLUTION INTRAMUSCULAR; INTRAVENOUS at 22:14

## 2018-11-06 RX ADMIN — ACETYLCYSTEINE 600 MG: 200 SOLUTION ORAL; RESPIRATORY (INHALATION) at 23:05

## 2018-11-06 RX ADMIN — PREGABALIN 50 MG: 50 CAPSULE ORAL at 10:31

## 2018-11-06 RX ADMIN — AMLODIPINE BESYLATE 5 MG: 5 TABLET ORAL at 22:15

## 2018-11-06 RX ADMIN — BENZONATATE 200 MG: 100 CAPSULE ORAL at 22:13

## 2018-11-06 RX ADMIN — BUSPIRONE HYDROCHLORIDE 15 MG: 10 TABLET ORAL at 17:52

## 2018-11-06 RX ADMIN — PREGABALIN 50 MG: 50 CAPSULE ORAL at 16:33

## 2018-11-06 RX ADMIN — LEVALBUTEROL HYDROCHLORIDE 1.25 MG: 1.25 SOLUTION, CONCENTRATE RESPIRATORY (INHALATION) at 07:21

## 2018-11-06 RX ADMIN — PANTOPRAZOLE SODIUM 40 MG: 40 TABLET, DELAYED RELEASE ORAL at 06:09

## 2018-11-06 RX ADMIN — AMLODIPINE BESYLATE 5 MG: 5 TABLET ORAL at 10:27

## 2018-11-06 RX ADMIN — INSULIN LISPRO 2 UNITS: 100 INJECTION, SOLUTION INTRAVENOUS; SUBCUTANEOUS at 12:05

## 2018-11-06 RX ADMIN — BUSPIRONE HYDROCHLORIDE 15 MG: 10 TABLET ORAL at 10:29

## 2018-11-06 RX ADMIN — PREGABALIN 50 MG: 50 CAPSULE ORAL at 22:14

## 2018-11-06 RX ADMIN — IPRATROPIUM BROMIDE 0.5 MG: 0.5 SOLUTION RESPIRATORY (INHALATION) at 11:24

## 2018-11-06 RX ADMIN — ACETYLCYSTEINE 600 MG: 200 SOLUTION ORAL; RESPIRATORY (INHALATION) at 15:48

## 2018-11-06 RX ADMIN — LISINOPRIL 20 MG: 20 TABLET ORAL at 10:30

## 2018-11-06 RX ADMIN — BENZONATATE 200 MG: 100 CAPSULE ORAL at 10:28

## 2018-11-06 RX ADMIN — ASPIRIN 81 MG 81 MG: 81 TABLET ORAL at 10:28

## 2018-11-06 RX ADMIN — IPRATROPIUM BROMIDE 0.5 MG: 0.5 SOLUTION RESPIRATORY (INHALATION) at 07:20

## 2018-11-06 RX ADMIN — HYDROCODONE BITARTRATE AND ACETAMINOPHEN 1 TABLET: 5; 325 TABLET ORAL at 10:39

## 2018-11-06 RX ADMIN — LEVALBUTEROL HYDROCHLORIDE 1.25 MG: 1.25 SOLUTION, CONCENTRATE RESPIRATORY (INHALATION) at 11:24

## 2018-11-06 RX ADMIN — ALPRAZOLAM 2 MG: 0.5 TABLET ORAL at 22:15

## 2018-11-06 RX ADMIN — INSULIN LISPRO 2 UNITS: 100 INJECTION, SOLUTION INTRAVENOUS; SUBCUTANEOUS at 10:03

## 2018-11-06 RX ADMIN — HYDROCODONE BITARTRATE AND ACETAMINOPHEN 1 TABLET: 5; 325 TABLET ORAL at 22:58

## 2018-11-06 NOTE — PROGRESS NOTES
[facility] Internal Medicine Progress Note  Patient: Mitchell Parks 60 y o  male   MRN: [id number]  PCP: Kumar Tierney MD  Unit/Bed#: [address] [place] [address] Encounter: [id number]  Date Of Visit: 11/06/18    Problem List:    Principal Problem:    COPD with exacerbation ([place] [address] )  Active Problems:    Acute on chronic respiratory failure with hypoxia (HCC)    Chest wall pain    Bipolar affective disorder ([place] [address] )    Diabetes mellitus type 2, uncontrolled ([place] [address] )    SHAVONNE (obstructive sleep apnea)    Benign essential hypertension      Assessment & Plan:    * COPD with exacerbation ([place] [address] )   Assessment & Plan    · Patient presenting with SOB, cough, hypoxia  · Hx severe restrictive lung disease with FEV1 of 2 1L or 41% Predicted  · CTA without evidence of pneumonia and PE  · Respiratory pathogen profile at cultures been negative  · Seen by Pulmonary, input appreciated  · Cont atrovent/xopenex neb q4 and q2 PRN  · Cough suppression with tessalon perles and tussionex, toradol for pleuritic and chest wall pain  · Started of chest PT  · Cont IV solumedrol, wean as tolerated  · Azithromycin x 5 days completed   · Slow improvement  · Continue supplemental oxygen, taper as tolerated  · Will get home O2 evaluation     Acute on chronic respiratory failure with hypoxia (HCC)   Assessment & Plan    · Hx COPD on 2L NC at night  · Admitted with hypoxia, requiring NC   From COPD exac, PNA  · Required BIPAP overnight  · Cont NC for O2 sats >92%, wean as tolerated  · Lasix x1 given volume retention related to steroids  · Will need home O2 eval prior to DC     Chest wall pain   Assessment & Plan    Pleuritic, associated with coughing  Associated with chest wall ecchymosis secondary to coughing  Improving  Continue symptomatic treatment     Benign essential hypertension   Assessment & Plan    · BP initially elevated on admission  · Cont home meds, BP improved     SHAVONNE (obstructive sleep apnea)   Assessment & Plan    · Cont CPAP at [facility]  · Will need home O2 eval prior to DC     Diabetes mellitus type 2, uncontrolled ([facility] )   Assessment & Plan    Lab Results   Component Value Date    HGBA1C 7 6 (H) 10/29/2018       Recent Labs      11/06/18   0707  11/06/18   1128  11/06/18   1633  11/06/18   2036   POCGLU  229*  221*  237*  273*       Blood Sugar Average: Last 72 hrs:  (P) 277 125     Cont home lantus, lispro  Cont ISS, increased TIDAC insulin for hyperglycemia     Bipolar affective disorder ([facility] )   Assessment & Plan    · Cont home meds: effexor, seroquel, remeron, cariprazine, buspar, xanax     Sepsis (HCC)-resolved as of 11/6/2018   Assessment & Plan    · RESOLVED  A/e/b leukocytosis, tachycardia, tachypnea  · CXR showed NAD  · CTA PE negative for PE or PNA  · Repeat CXR showed infiltrates, source is PNA  · LA elevated at 2 8, normalized  · Workup as noted above     Pneumonia-resolved as of 11/6/2018   Assessment & Plan    · Pt presenting with cough and subjective fevers  CAP vs viral PNA with atelectasis   · Initial CXR showed NAD, CTA chest negative for PNA  · Repeat CXR showed Bibasilar infiltrates and/or atelectasis  · FLu/RSV PCR negative  · resp pathogen profile negative  · Cont toradol PRN pleuritic chest pain  · Completed 5 day course of azithromycin  · Continue pain control, incentive spirometer           VTE Pharmacologic Prophylaxis:   Pharmacologic: Enoxaparin (Lovenox) on hold secondary to large lower chest wall/parasternal ecchymosis  Mechanical VTE Prophylaxis in Place: Yes    Patient Centered Rounds: I have performed bedside rounds with nursing staff today  Discussions with Specialists or Other Care Team Provider: Yes, pulmonary, Dr Melvin Carter    Education and Discussions with Family / Patient:Yes    Time Spent for Care: 45 minutes  More than 50% of total time spent on counseling and coordination of care as described above      Current Length of Stay: 6 day(s)    Current Patient Status: Inpatient     Discharge Plan:  Home in next 24-48 hours    Code Status: Level 1 - Full Code    Certification Statement: The patient will continue to require additional inpatient hospital stay due to COPD exacerbation      Subjective:   Still with cough but less intense  Breathing is improving  Still requiring 3-4 L of supplemental oxygen    Objective:     Comfortably sitting up in bed    Negative for Chest Pain, Palpitations, Abdominal Pain, Nausea, Vomiting, Constipation, Diarrhea, Dizziness  All other 10 review of systems negative and without drastic interval changes from yesterday  Vitals:   Temp (24hrs), Av 5 °F (36 4 °C), Min:96 3 °F (35 7 °C), Max:98 3 °F (36 8 °C)    Temp:  [96 3 °F (35 7 °C)-98 3 °F (36 8 °C)] 98 3 °F (36 8 °C)  HR:  [67-76] 71  Resp:  [18-20] 20  BP: (153-163)/(73-80) 153/73  SpO2:  [90 %-96 %] 96 %  Body mass index is 40 45 kg/m²  Input and Output Summary (last 24 hours): Intake/Output Summary (Last 24 hours) at 18  Last data filed at 18 0026   Gross per 24 hour   Intake                0 ml   Output              800 ml   Net             -800 ml       Physical Exam:     Physical Exam   Constitutional: He appears well-developed  No distress  Obese   HENT:   Head: Normocephalic and atraumatic  Eyes: Pupils are equal, round, and reactive to light  Conjunctivae are normal    Neck: Normal range of motion  Neck supple  Cardiovascular: Normal rate and normal heart sounds  Pulmonary/Chest: Effort normal  No respiratory distress  He has decreased breath sounds  He has no wheezes  He has rhonchi  He has no rales  He exhibits no tenderness  Coarse breath sounds   Ecchymosis is gradually resolving   Abdominal: Soft  Bowel sounds are normal  He exhibits no distension  There is no tenderness  There is no rebound and no guarding  Musculoskeletal: He exhibits edema (One to 2+ bilateral lower extremity, symmetric)  Neurological: He is alert  No cranial nerve deficit  Skin: Skin is warm and dry  No rash noted  Additional Data:     Labs:      Results from last 7 days  Lab Units 11/06/18  0606 11/02/18  0626   WBC Thousand/uL 13 33*  < > 13 77*   HEMOGLOBIN g/dL 15 0  < > 13 4   HEMATOCRIT % 44 5  < > 40 8   PLATELETS Thousands/uL 190  < > 169   NEUTROS PCT %  --   --  77*   LYMPHS PCT %  --   --  17   MONOS PCT %  --   --  5   EOS PCT %  --   --  0   < > = values in this interval not displayed  Results from last 7 days  Lab Units 11/06/18  0606  10/31/18  0926   POTASSIUM mmol/L 5 0  < > 4 3   CHLORIDE mmol/L 96*  < > 100   CO2 mmol/L 28  < > 23   BUN mg/dL 31*  < > 24   CREATININE mg/dL 0 87  < > 1 10   CALCIUM mg/dL 9 5  < > 9 9   ALK PHOS U/L  --   --  62   ALT U/L  --   --  55   AST U/L  --   --  32   < > = values in this interval not displayed  Results from last 7 days  Lab Units 10/31/18  0926   INR  0 98       * I Have Reviewed All Lab Data Listed Above  * Additional Pertinent Lab Tests Reviewed: [address] Admission Reviewed    Imaging:  Xr Chest Portable    Result Date: 11/1/2018  Narrative: CHEST INDICATION:   progressive hypoxemia  COMPARISON:  10/31/2018 EXAM PERFORMED/VIEWS:  XR CHEST PORTABLE 1 image FINDINGS: Cardiomediastinal silhouette appears enlarged  No evidence of heart failure  Bibasilar infiltrates and/or atelectasis  Cannot exclude small pleural effusions  Osseous structures appear within normal limits for patient age  Impression: Bibasilar infiltrates and/or atelectasis  Workstation performed: [id number]     Xr Chest 1 View Portable    Result Date: 10/31/2018  Narrative: CHEST INDICATION:   SOB  Shortness of breath COMPARISON:  11/06/2017 EXAM PERFORMED/VIEWS:  XR CHEST PORTABLE 1 image FINDINGS: Cardiomediastinal silhouette appears enlarged  The pulmonary vessels are normal  Scarring at the right lung base  No infiltrates  Osseous structures appear within normal limits for patient age  Impression: Cardiomegaly  Scarring at the right lung base  Workstation performed: [id number]     Cta Ed Chest Pe Study    Result Date: 10/31/2018  Narrative: CTA - CHEST WITH IV CONTRAST - PULMONARY ANGIOGRAM INDICATION:   pleuritic chest pain  COMPARISON: None  TECHNIQUE: CTA examination of the chest was performed using angiographic technique according to a protocol specifically tailored to evaluate for pulmonary embolism  Axial, sagittal, and coronal 2D reformatted images were created from the source data and  submitted for interpretation  In addition, coronal 3D MIP postprocessing was performed on the acquisition scanner  Radiation dose length product (DLP) for this visit:  791 61 mGy-cm   This examination, like all CT scans performed in the [facility], was performed utilizing techniques to minimize radiation dose exposure, including the use of iterative  reconstruction and automated exposure control  IV Contrast:  100 mL of iohexol (OMNIPAQUE)  FINDINGS: PULMONARY ARTERIAL TREE:  No pulmonary embolus is seen  LUNGS:  Lungs are clear  There is no tracheal or endobronchial lesion  PLEURA:  Unremarkable  HEART/GREAT VESSELS:  Cardiomegaly evident  MEDIASTINUM AND CHRISTOPHER:  Unremarkable  CHEST WALL AND LOWER NECK:   Unremarkable  VISUALIZED STRUCTURES IN THE UPPER ABDOMEN:  Enlarged fatty liver evident  Status post cholecystectomy  Fatty replaced pancreas  OSSEOUS STRUCTURES:  No acute fracture or destructive osseous lesion  Impression: No PE  Workstation performed: [id number]     Imaging Reports Reviewed by myself    Cultures:   Blood Culture:   Lab Results   Component Value Date    BLOODCX No Growth After 5 Days  10/31/2018    BLOODCX No Growth After 5 Days  10/31/2018    BLOODCX No Growth After 5 Days  09/06/2017    BLOODCX No Growth After 5 Days  09/06/2017    BLOODCX Diphtheroids 03/20/2017    BLOODCX No Growth After 5 Days  03/20/2017    BLOODCX No Growth After 5 Days  01/12/2016    BLOODCX No Growth After 5 Days   01/12/2016     Urine Culture:   Lab Results   Component Value Date    URINECX 3470-5632 cfu/ml Mixed Contaminants X2 03/20/2017    URINECX No Growth <1000 cfu/mL 11/27/2016    URINECX No Growth <1000 cfu/mL 09/02/2016     Sputum Culture: No components found for: SPUTUMCX  Wound Culture: No results found for: WOUNDCULT    Last 24 Hours Medication List:     Current Facility-Administered Medications:  acetaminophen 650 mg Oral Q6H PRN Lobito Haynes MD   acetylcysteine 3 mL Nebulization Q8H Nikunj Garcia PA-C   ALPRAZolam 2 mg Oral HS PRN Lobito Haynes MD   amLODIPine 5 mg Oral Q12H [address] [place]   aspirin 81 mg Oral QAM Lobito Haynes MD   atorvastatin 20 mg Oral Daily With Alin Martino MD   benzonatate 200 mg Oral TID Lobito Haynes MD   busPIRone 15 mg Oral BID Lobito Haynes MD   calcium carbonate 1,000 mg Oral Daily PRN Lobito Haynes MD   Dapagliflozin Propanediol 10 mg Oral QAM Lobito Haynes MD   ergocalciferol 50,000 Units Oral Once per day on Mon Thu Lobito Haynes MD   fluticasone-vilanterol 1 puff Inhalation Daily Lobito Haynes MD   furosemide 20 mg Intravenous BID (diuretic) Jorge Gannon MD   HYDROcodone-acetaminophen 1 tablet Oral Q6H PRN Andrey Bui DO   hydrocodone-chlorpheniramine polistirex 5 mL Oral Q12H PRN Lobito Haynes MD   ibuprofen 400 mg Oral Q6H PRN Lobito Haynes MD   insulin glargine 60 Units Subcutaneous HS Lobito Haynes MD   insulin lispro 1-5 Units Subcutaneous HS Lobito Haynes MD   insulin lispro 1-6 Units Subcutaneous  Elias Bailon MD   insulin lispro 17 Units Subcutaneous TID With Meals Lobito Haynes MD   ipratropium 0 5 mg Nebulization Q4H Lobito Haynes MD   ketorolac 15 mg Intravenous Q12H PRN Nikunj Garcia PA-C   levalbuterol 0 63 mg Nebulization Q2H PRN Lobito Haynes MD   levalbuterol 1 25 mg Nebulization Q4H Lobito Haynes MD   lisinopril 20 mg Oral Daily Lobito Haynes MD   methylPREDNISolone sodium succinate 20 mg Intravenous Q12H [address] [facility]   metoprolol tartrate 25 mg Oral Q12H [address] [facility], [place] mirtazapine 45 mg Oral HS Srinivasa Aquino MD   ondansetron 4 mg Intravenous Q6H PRN Srinivasa Aquino MD   pantoprazole 40 mg Oral Early Morning Srinivasa Aquino MD   pregabalin 50 mg Oral TID Srinivasa Aquino MD   QUEtiapine 400 mg Oral HS Srinivasa Aquino MD   venlafaxine 150 mg Oral Antoine Prieto MD        Today, Patient Was Seen By: Emmett Cantrell MD    ** Please Note: "This note has been constructed using a voice recognition system  Therefore there may be syntax, spelling, and/or grammatical errors   Please call if you have any questions  "**

## 2018-11-06 NOTE — OCCUPATIONAL THERAPY NOTE
OT TREATMENT       11/06/18 1509   Restrictions/Precautions   Other Precautions O2;Fall Risk;Pain   Pain Assessment   Pain Assessment No/denies pain   Pain Score 6   Pain Type Acute pain   Pain Location Chest   Pain Orientation Right;[facility]   Hospital Pain Intervention(s) Medication (See MAR); Ambulation/increased activity  (pillow support against chest when coughinh)   ADL   Where Assessed Edge of bed   Grooming Assistance 5  Supervision/Setup   Grooming Deficit Wash/dry hands; Wash/dry face;Brushing hair; Increased time to complete   LB Dressing Assistance 4  Minimal Assistance   LB Dressing Deficit Don/doff R sock; Don/doff L sock   Functional Standing Tolerance   Time 3 minutes   Activity BUE exercises   Comments Supervision   Bed Mobility   Supine to Sit 7  Independent   Transfers   Sit to Stand 5  Supervision   Stand to Sit 5  Supervision   Stand pivot 5  Supervision   Therapeutic Excerise-Strength   UE Strength Yes   Right Upper Extremity- Strength   R Shoulder Flexion;ABduction; Extension; External rotation; Internal rotation   R Elbow Elbow flexion;Elbow extension   R Wrist Wrist flexion;Wrist extension   R Hand (flex/ext)   R Position Seated  (edge of bed)   Equipment Dowel   R Weight/Reps/Sets 10 reps x 2 sets; 2#   Left Upper Extremity-Strength   L Shoulder Flexion;ABduction; Extension; External rotation; Internal rotation   L Elbow Elbow flexion;Elbow extension   L Wrist Wrist flexion;Wrist extension   L Hand (flex/ext)   L Position Seated   Equipment Dowel   L Weights/Reps/Sets 10 reps x 2 sets; 2#   Neuromuscular Education   Trunk Control unsupported static/dynamic sitting balance at edge of bed > 30 minutes with supervision only   Cognition   Overall Cognitive Status WFL   Activity Tolerance   Activity Tolerance Patient limited by pain; Patient limited by fatigue   Assessment   Assessment Pt demonstrating improved strength, balance and functional activity tolerance allowing for increased active participation with ADL and therapeutic exercises with intermittent rests  Pt with increased coughing during exertion, requiring use of pillow support against chest to decrease pain  Pt able to demonstrate verbal understanding and perform energy conservation/proper body mechanics during ADLS and functional mobility at least 75% of the time with min cueing to increase stamina to return to prior level of function  Dynamic sitting balance at edge of bed fair+  Good progress towards goals  Pt left sitting at edge of bed with all needs within reach  Cont OT per POC  Plan   Treatment Interventions ADL retraining;Functional transfer training; Endurance training;Patient/family training;Equipment evaluation/education; Compensatory technique education; Energy conservation   Treatment Day 1   OT Frequency 3-5x/wk   Recommendation   OT Discharge Recommendation Home OT   Licensure   [place] License Number  Cong Tovar MS, OTR/L, Lic# [id number]

## 2018-11-06 NOTE — PLAN OF CARE

## 2018-11-06 NOTE — PROGRESS NOTES
Progress Note - Pulmonary   Keyana Herman 60 y o  male MRN: [id number]  Unit/Bed#: [address] [place] [address] Encounter: [id number]      Assessment/Plan:   -acute COPD exacerbation with chronic hypoxemic respiratory failure:  -patient qualifies for home oxygen  -remains on 4-5 L nasal cannula  -remain out of bed  -patient tolerating and appreciative of chest vest therapy > aids mucus expectoration > continue while in hospital  -continue Solu-Medrol / Estefani Diver / duo nebs  -chest wall pain:  -improving  -p r n  Toradol available  -SHAVONNE:  -patient prefers BiPAP here in hospital  -should resume CPAP at home  -morbid obesity with BMI 40 45  -goal for weight loss            ______________________________________________________________________    Subjective: Pt seen and examined at bedside  Patient feeling better today  Still some mucus production  States he feels that chest vest therapy helps  Patient does feel ready go home today  Tele Events:     Vitals:   Temp:  [96 3 °F (35 7 °C)-98 3 °F (36 8 °C)] 98 3 °F (36 8 °C)  HR:  [67-76] 71  Resp:  [18-20] 20  BP: (153-163)/(73-80) 153/73  Weight (last 2 days)     None        Oxygen Therapy  SpO2: 92 %  O2 Flow Rate (L/min): 4 L/min    IV Infusions:       Nutrition:        Diet Orders            Start     Ordered    10/31/18 1259  Diet Campos/CHO Controlled; Consistent Carbohydrate Diet Level 2 (5 carb servings/75 grams CHO/meal)  Diet effective now     Question Answer Comment   Diet Type Campos/CHO Controlled    Campos/CHO Controlled Consistent Carbohydrate Diet Level 2 (5 carb servings/75 grams CHO/meal)    RD to adjust diet per protocol?  Yes        10/31/18 1258    10/31/18 1227  Room Service  Once     Question:  Type of Service  Answer:  Room Service-Appropriate    10/31/18 1227          Ins/Outs:   I/O       11/04 0701 - 11/05 0700 11/05 0701 - 11/06 0700 11/06 0701 - 11/07 0700    Urine (mL/kg/hr) 1200 (0 4) 800 (0 3)     Total Output 1200 800      Net -1200 -800 Lines/Drains:  Invasive Devices     Peripheral Intravenous Line            Peripheral IV 11/02/18 Left Hand 3 days    Peripheral IV 11/06/18 Right Forearm less than 1 day                 Active medications:  Scheduled Meds:  Current Facility-Administered Medications:  acetaminophen 650 mg Oral Q6H PRN Devon Lu MD   acetylcysteine 3 mL Nebulization Q8H Lucy Treviño PA-C   ALPRAZolam 2 mg Oral HS PRN Devon Lu MD   amLODIPine 5 mg Oral Q12H 3630 Lewis Paul MD   aspirin 81 mg Oral ANTHONY Lu MD   atorvastatin 20 mg Oral Daily With Alin Martino MD   benzonatate 200 mg Oral TID Devon Lu MD   busPIRone 15 mg Oral BID Devon Lu MD   calcium carbonate 1,000 mg Oral Daily PRN Devon Lu MD   Dapagliflozin Propanediol 10 mg Oral ANTHONY Lu MD   ergocalciferol 50,000 Units Oral Once per day on Mon Thu Devon Lu MD   fluticasone-vilanterol 1 puff Inhalation Daily Devon Lu MD   HYDROcodone-acetaminophen 1 tablet Oral Q6H PRN Renee Arauz,    hydrocodone-chlorpheniramine polistirex 5 mL Oral Q12H PRN Devon Lu MD   ibuprofen 400 mg Oral Q6H PRN Devon Lu MD   insulin glargine 60 Units Subcutaneous HS Devon Lu MD   insulin lispro 1-5 Units Subcutaneous HS Devon Lu MD   insulin lispro 1-6 Units Subcutaneous TID AC Devon Lu MD   insulin lispro 17 Units Subcutaneous TID With Meals Devon Lu MD   ipratropium 0 5 mg Nebulization Q4H Devon Lu MD   ketorolac 15 mg Intravenous Q12H PRN Lucy Treviño PA-C   levalbuterol 0 63 mg Nebulization Q2H PRN Devon Lu MD   levalbuterol 1 25 mg Nebulization Q4H Devon Lu MD   lisinopril 20 mg Oral Daily Devon Lu MD   methylPREDNISolone sodium succinate 20 mg Intravenous Q12H [address] Lucy Treviño PA-C   metoprolol tartrate 25 mg Oral Q12H 3630 Lewis Paul MD   mirtazapine 45 mg Oral HS Genell Molt, MD   ondansetron 4 mg Intravenous Q6H PRN Devon Lu MD   pantoprazole 40 mg Oral Early Morning Devon Lu MD   pregabalin 50 mg Oral TID Traci Berumen MD   QUEtiapine 400 mg Oral HS Trcai Berumen MD   venlafaxine 150 mg Oral QAM Traci Berumen MD     PRN Meds:  acetaminophen 650 mg Q6H PRN   ALPRAZolam 2 mg HS PRN   calcium carbonate 1,000 mg Daily PRN   HYDROcodone-acetaminophen 1 tablet Q6H PRN   hydrocodone-chlorpheniramine polistirex 5 mL Q12H PRN   ibuprofen 400 mg Q6H PRN   ketorolac 15 mg Q12H PRN   levalbuterol 0 63 mg Q2H PRN   ondansetron 4 mg Q6H PRN     ____________________________________________________________________      Physical Exam   Constitutional: He is oriented to person, place, and time  He appears well-developed and well-nourished  No distress  Obese body habitus   HENT:   Head: Normocephalic and atraumatic  Mouth/Throat: No oropharyngeal exudate  Eyes: Pupils are equal, round, and reactive to light  No scleral icterus  Neck: Normal range of motion  No tracheal deviation present  Cardiovascular: Normal rate, regular rhythm, normal heart sounds and intact distal pulses  Exam reveals no gallop and no friction rub  No murmur heard  Pulmonary/Chest: No accessory muscle usage or stridor  No tachypnea  No respiratory distress  He has decreased breath sounds in the right lower field and the left lower field  He has wheezes in the right lower field and the left lower field  He has rhonchi in the right middle field and the left lower field  He has no rales  He exhibits no tenderness  Abdominal: Soft  He exhibits no distension  There is no tenderness  There is no rebound and no guarding  Musculoskeletal: Normal range of motion  He exhibits no edema  Neurological: He is alert and oriented to person, place, and time  Skin: Skin is warm and dry     Psychiatric: He has a normal mood and affect            ____________________________________________________________________    Labs:   CBC:   Results from last 7 days  Lab Units 11/06/18  0606 11/05/18  0544 11/04/18  0632   WBC Thousand/uL 13 33* 11 68* 9 38 HEMOGLOBIN g/dL 15 0 14 6 13 5   HEMATOCRIT % 44 5 44 2 40 8   MCV fL 93 94 93   PLATELETS Thousands/uL 190 176 159     CMP:   Results from last 7 days  Lab Units 11/06/18  0606 11/05/18  0544 11/04/18  0632  10/31/18  0926   POTASSIUM mmol/L 5 0 5 4* 4 7  < > 4 3   CHLORIDE mmol/L 96* 96* 96*  < > 100   CO2 mmol/L 28 26 26  < > 23   BUN mg/dL 31* 31* 33*  < > 24   CREATININE mg/dL 0 87 0 87 0 94  < > 1 10   CALCIUM mg/dL 9 5 9 6 9 0  < > 9 9   AST U/L  --   --   --   --  32   ALT U/L  --   --   --   --  55   ALK PHOS U/L  --   --   --   --  62   EGFR ml/min/1 73sq m 94 94 88  < > 73   < > = values in this interval not displayed  Magnesium:   Results from last 7 days  Lab Units 11/03/18  0617   MAGNESIUM mg/dL 2 2     Phosphorous:     Troponin:   Results from last 7 days  Lab Units 10/31/18  0926   TROPONIN I ng/mL <0 02     PT/INR:   Results from last 7 days  Lab Units 10/31/18  0926   PTT seconds 23*   INR  0 98     Lactic Acid:   Results from last 7 days  Lab Units 11/01/18  0212 11/01/18  0026   LACTIC ACID mmol/L 1 8 2 7*     BNP:   Results from last 7 days  Lab Units 10/31/18  0926   NT-PRO BNP pg/mL 36     TSH:     Procalcitonin: Invalid input(s): PROCALCITONIN      Imaging:   XR chest portable   Final Result by Braulio Garcia MD (11/01 0366)      Bibasilar infiltrates and/or atelectasis  Workstation performed: [id number]         CTA ED chest PE study   Final Result by Antonio Coleman MD (10/31 3519)   No PE  Workstation performed: [id number]         XR chest 1 view portable   Final Result by Braulio Garcia MD (71/64 8370)   Cardiomegaly  Scarring at the right lung base  Workstation performed: [id number]                 Micro: Lab Results   Component Value Date    BLOODCX No Growth After 5 Days  10/31/2018    BLOODCX No Growth After 5 Days  10/31/2018    BLOODCX No Growth After 5 Days   09/06/2017    URINECX 3480-9330 cfu/ml Mixed Contaminants X2 03/20/2017    URINECX No Growth <1000 cfu/mL 11/27/2016    URINECX No Growth <1000 cfu/mL 09/02/2016    SPUTUMCULTUR Test not performed  Suggest repeat specimen   11/08/2017    SPUTUMCULTUR 1+ Growth of Staphylococcus aureus 03/20/2017    SPUTUMCULTUR 1+ Growth of Mixed Respiratory Francine 03/20/2017    MRSACULTURE  10/31/2018     No Methicillin Resistant Staphlyococcus aureus (MRSA) isolated        Results from last 7 days  Lab Units 10/31/18  1447   INFLUENZA B PCR  None Detected   RSV PCR  None Detected           Code Status: Level 1 - Full Code

## 2018-11-06 NOTE — RESPIRATORY THERAPY NOTE
Home Oxygen Qualifying Test       Patient name: Aurea Gilbert        : 1960   Date of Test:  2018  Diagnosis:      Home Oxygen Test:    **Medicare Guidelines require item(s) 1-5 on all ambulatory patients or 1 and 2 on non-ambulatory patients  1   Baseline SPO2 on Room Air at rest 89%  2   SPO2 during exercise on Room Air 85 %  During exercise monitor SpO2  If SPO2 increases >=89% with ambulation do not add supplemental             oxygen  If <= 88% on room air add O2 via NC and titrate patient  Patient must be ambulated with O2 and titrated to > 88% with exertion  3   SPO2 on Oxygen at rest 94 % 2 lpm     4   SPO2 during exercise on Oxygen  90% a liter flow of 2 lpm     5   Exercise performed:          walking, duration 6 (min)          [x]  Supplemental Home Oxygen is indicated  []  Client does not qualify for home oxygen        Respiratory Additional Notes- Patient only able to walk 6 min complain of SOB    Chavo Pack, RT

## 2018-11-06 NOTE — PLAN OF CARE
Problem: OCCUPATIONAL THERAPY ADULT  Goal: Performs self-care activities at highest level of function for planned discharge setting  See evaluation for individualized goals  Outcome: Progressing  Limitation: Decreased ADL status, Decreased Safe judgement during ADL, Decreased endurance, Decreased self-care trans, Decreased high-level ADLs  Prognosis: Good  Assessment: Pt demonstrating improved strength, balance and functional activity tolerance allowing for increased active participation with ADL and therapeutic exercises with intermittent rests  Pt with increased coughing during exertion, requiring use of pillow support against chest to decrease pain  Pt able to demonstrate verbal understanding and perform energy conservation/proper body mechanics during ADLS and functional mobility at least 75% of the time with min cueing to increase stamina to return to prior level of function  Dynamic sitting balance at edge of bed fair+  Good progress towards goals  Pt left sitting at edge of bed with all needs within reach  Cont OT per POC       OT Discharge Recommendation: Home OT

## 2018-11-07 VITALS
TEMPERATURE: 98.2 F | RESPIRATION RATE: 19 BRPM | HEIGHT: 71 IN | OXYGEN SATURATION: 94 % | DIASTOLIC BLOOD PRESSURE: 74 MMHG | HEART RATE: 83 BPM | SYSTOLIC BLOOD PRESSURE: 174 MMHG | WEIGHT: 290 LBS | BODY MASS INDEX: 40.6 KG/M2

## 2018-11-07 PROBLEM — J96.21 ACUTE ON CHRONIC RESPIRATORY FAILURE WITH HYPOXIA (HCC): Status: RESOLVED | Noted: 2018-10-31 | Resolved: 2018-11-07

## 2018-11-07 PROBLEM — R07.89 CHEST WALL PAIN: Status: RESOLVED | Noted: 2018-11-06 | Resolved: 2018-11-07

## 2018-11-07 LAB
ANION GAP SERPL CALCULATED.3IONS-SCNC: 9 MMOL/L (ref 4–13)
BUN SERPL-MCNC: 31 MG/DL (ref 5–25)
CALCIUM SERPL-MCNC: 9 MG/DL (ref 8.3–10.1)
CHLORIDE SERPL-SCNC: 96 MMOL/L (ref 100–108)
CO2 SERPL-SCNC: 29 MMOL/L (ref 21–32)
CREAT SERPL-MCNC: 0.96 MG/DL (ref 0.6–1.3)
ERYTHROCYTE [DISTWIDTH] IN BLOOD BY AUTOMATED COUNT: 12.3 % (ref 11.6–15.1)
GFR SERPL CREATININE-BSD FRML MDRD: 86 ML/MIN/1.73SQ M
GLUCOSE SERPL-MCNC: 197 MG/DL (ref 65–140)
GLUCOSE SERPL-MCNC: 238 MG/DL (ref 65–140)
GLUCOSE SERPL-MCNC: 390 MG/DL (ref 65–140)
GLUCOSE SERPL-MCNC: 449 MG/DL (ref 65–140)
HCT VFR BLD AUTO: 44.3 % (ref 36.5–49.3)
HGB BLD-MCNC: 14.8 G/DL (ref 12–17)
MCH RBC QN AUTO: 31.3 PG (ref 26.8–34.3)
MCHC RBC AUTO-ENTMCNC: 33.4 G/DL (ref 31.4–37.4)
MCV RBC AUTO: 94 FL (ref 82–98)
PLATELET # BLD AUTO: 193 THOUSANDS/UL (ref 149–390)
PMV BLD AUTO: 9.8 FL (ref 8.9–12.7)
POTASSIUM SERPL-SCNC: 4.6 MMOL/L (ref 3.5–5.3)
RBC # BLD AUTO: 4.73 MILLION/UL (ref 3.88–5.62)
SODIUM SERPL-SCNC: 134 MMOL/L (ref 136–145)
WBC # BLD AUTO: 15.16 THOUSAND/UL (ref 4.31–10.16)

## 2018-11-07 PROCEDURE — 94640 AIRWAY INHALATION TREATMENT: CPT

## 2018-11-07 PROCEDURE — 80048 BASIC METABOLIC PNL TOTAL CA: CPT | Performed by: STUDENT IN AN ORGANIZED HEALTH CARE EDUCATION/TRAINING PROGRAM

## 2018-11-07 PROCEDURE — 94668 MNPJ CHEST WALL SBSQ: CPT

## 2018-11-07 PROCEDURE — 85027 COMPLETE CBC AUTOMATED: CPT | Performed by: STUDENT IN AN ORGANIZED HEALTH CARE EDUCATION/TRAINING PROGRAM

## 2018-11-07 PROCEDURE — 97535 SELF CARE MNGMENT TRAINING: CPT

## 2018-11-07 PROCEDURE — 99239 HOSP IP/OBS DSCHRG MGMT >30: CPT | Performed by: INTERNAL MEDICINE

## 2018-11-07 PROCEDURE — 99232 SBSQ HOSP IP/OBS MODERATE 35: CPT | Performed by: INTERNAL MEDICINE

## 2018-11-07 PROCEDURE — 82948 REAGENT STRIP/BLOOD GLUCOSE: CPT

## 2018-11-07 PROCEDURE — 94669 MECHANICAL CHEST WALL OSCILL: CPT

## 2018-11-07 PROCEDURE — 94760 N-INVAS EAR/PLS OXIMETRY 1: CPT

## 2018-11-07 RX ORDER — GUAIFENESIN/DEXTROMETHORPHAN 100-10MG/5
5 SYRUP ORAL 3 TIMES DAILY PRN
Qty: 118 ML | Refills: 0 | Status: SHIPPED | OUTPATIENT
Start: 2018-11-07 | End: 2018-11-19 | Stop reason: HOSPADM

## 2018-11-07 RX ORDER — HYDROCODONE BITARTRATE AND ACETAMINOPHEN 5; 325 MG/1; MG/1
1 TABLET ORAL EVERY 6 HOURS PRN
Qty: 20 TABLET | Refills: 0 | Status: SHIPPED | OUTPATIENT
Start: 2018-11-07 | End: 2018-11-14

## 2018-11-07 RX ORDER — HYDROCODONE POLISTIREX AND CHLORPHENIRAMINE POLISTIREX 10; 8 MG/5ML; MG/5ML
5 SUSPENSION, EXTENDED RELEASE ORAL EVERY 12 HOURS PRN
Qty: 120 ML | Refills: 0 | Status: SHIPPED | OUTPATIENT
Start: 2018-11-07 | End: 2018-11-19 | Stop reason: HOSPADM

## 2018-11-07 RX ORDER — PREDNISONE 10 MG/1
10 TABLET ORAL DAILY
Status: DISCONTINUED | OUTPATIENT
Start: 2018-11-16 | End: 2018-11-07 | Stop reason: HOSPADM

## 2018-11-07 RX ORDER — PREDNISONE 20 MG/1
20 TABLET ORAL DAILY
Status: DISCONTINUED | OUTPATIENT
Start: 2018-11-13 | End: 2018-11-07 | Stop reason: HOSPADM

## 2018-11-07 RX ORDER — PREDNISONE 10 MG/1
TABLET ORAL
Qty: 30 TABLET | Refills: 0 | Status: SHIPPED | OUTPATIENT
Start: 2018-11-07 | End: 2018-11-19 | Stop reason: HOSPADM

## 2018-11-07 RX ORDER — BENZONATATE 200 MG/1
200 CAPSULE ORAL 3 TIMES DAILY
Qty: 20 CAPSULE | Refills: 0 | Status: SHIPPED | OUTPATIENT
Start: 2018-11-07 | End: 2019-02-25 | Stop reason: SDUPTHER

## 2018-11-07 RX ORDER — PREDNISONE 20 MG/1
40 TABLET ORAL DAILY
Status: DISCONTINUED | OUTPATIENT
Start: 2018-11-07 | End: 2018-11-07 | Stop reason: HOSPADM

## 2018-11-07 RX ADMIN — METOPROLOL TARTRATE 25 MG: 25 TABLET ORAL at 08:23

## 2018-11-07 RX ADMIN — IBUPROFEN 400 MG: 400 TABLET ORAL at 17:02

## 2018-11-07 RX ADMIN — VENLAFAXINE HYDROCHLORIDE 150 MG: 150 CAPSULE, EXTENDED RELEASE ORAL at 08:24

## 2018-11-07 RX ADMIN — ATORVASTATIN CALCIUM 20 MG: 20 TABLET, FILM COATED ORAL at 16:57

## 2018-11-07 RX ADMIN — PREGABALIN 50 MG: 50 CAPSULE ORAL at 15:08

## 2018-11-07 RX ADMIN — LISINOPRIL 20 MG: 20 TABLET ORAL at 08:23

## 2018-11-07 RX ADMIN — AMLODIPINE BESYLATE 5 MG: 5 TABLET ORAL at 08:20

## 2018-11-07 RX ADMIN — PANTOPRAZOLE SODIUM 40 MG: 40 TABLET, DELAYED RELEASE ORAL at 06:11

## 2018-11-07 RX ADMIN — LEVALBUTEROL HYDROCHLORIDE 1.25 MG: 1.25 SOLUTION, CONCENTRATE RESPIRATORY (INHALATION) at 15:19

## 2018-11-07 RX ADMIN — INSULIN LISPRO 1 UNITS: 100 INJECTION, SOLUTION INTRAVENOUS; SUBCUTANEOUS at 08:22

## 2018-11-07 RX ADMIN — ASPIRIN 81 MG 81 MG: 81 TABLET ORAL at 08:20

## 2018-11-07 RX ADMIN — HYDROCODONE BITARTRATE AND ACETAMINOPHEN 1 TABLET: 5; 325 TABLET ORAL at 06:13

## 2018-11-07 RX ADMIN — FLUTICASONE FUROATE AND VILANTEROL TRIFENATATE 1 PUFF: 200; 25 POWDER RESPIRATORY (INHALATION) at 08:22

## 2018-11-07 RX ADMIN — BUSPIRONE HYDROCHLORIDE 15 MG: 10 TABLET ORAL at 08:21

## 2018-11-07 RX ADMIN — ACETYLCYSTEINE 600 MG: 200 SOLUTION ORAL; RESPIRATORY (INHALATION) at 15:19

## 2018-11-07 RX ADMIN — LEVALBUTEROL HYDROCHLORIDE 1.25 MG: 1.25 SOLUTION, CONCENTRATE RESPIRATORY (INHALATION) at 11:29

## 2018-11-07 RX ADMIN — METHYLPREDNISOLONE SODIUM SUCCINATE 20 MG: 40 INJECTION, POWDER, FOR SOLUTION INTRAMUSCULAR; INTRAVENOUS at 08:23

## 2018-11-07 RX ADMIN — BENZONATATE 200 MG: 100 CAPSULE ORAL at 15:07

## 2018-11-07 RX ADMIN — IPRATROPIUM BROMIDE 0.5 MG: 0.5 SOLUTION RESPIRATORY (INHALATION) at 03:01

## 2018-11-07 RX ADMIN — BUSPIRONE HYDROCHLORIDE 15 MG: 10 TABLET ORAL at 16:58

## 2018-11-07 RX ADMIN — LEVALBUTEROL HYDROCHLORIDE 1.25 MG: 1.25 SOLUTION, CONCENTRATE RESPIRATORY (INHALATION) at 03:01

## 2018-11-07 RX ADMIN — INSULIN LISPRO 6 UNITS: 100 INJECTION, SOLUTION INTRAVENOUS; SUBCUTANEOUS at 15:10

## 2018-11-07 RX ADMIN — ACETYLCYSTEINE 600 MG: 200 SOLUTION ORAL; RESPIRATORY (INHALATION) at 07:25

## 2018-11-07 RX ADMIN — PREGABALIN 50 MG: 50 CAPSULE ORAL at 08:24

## 2018-11-07 RX ADMIN — FUROSEMIDE 20 MG: 10 INJECTION, SOLUTION INTRAMUSCULAR; INTRAVENOUS at 08:22

## 2018-11-07 RX ADMIN — INSULIN LISPRO 6 UNITS: 100 INJECTION, SOLUTION INTRAVENOUS; SUBCUTANEOUS at 11:56

## 2018-11-07 RX ADMIN — IPRATROPIUM BROMIDE 0.5 MG: 0.5 SOLUTION RESPIRATORY (INHALATION) at 07:25

## 2018-11-07 RX ADMIN — IPRATROPIUM BROMIDE 0.5 MG: 0.5 SOLUTION RESPIRATORY (INHALATION) at 11:29

## 2018-11-07 RX ADMIN — PREDNISONE 40 MG: 20 TABLET ORAL at 11:19

## 2018-11-07 RX ADMIN — HYDROCODONE BITARTRATE AND ACETAMINOPHEN 1 TABLET: 5; 325 TABLET ORAL at 15:07

## 2018-11-07 RX ADMIN — BENZONATATE 200 MG: 100 CAPSULE ORAL at 08:21

## 2018-11-07 RX ADMIN — LEVALBUTEROL HYDROCHLORIDE 1.25 MG: 1.25 SOLUTION, CONCENTRATE RESPIRATORY (INHALATION) at 07:24

## 2018-11-07 RX ADMIN — IPRATROPIUM BROMIDE 0.5 MG: 0.5 SOLUTION RESPIRATORY (INHALATION) at 15:19

## 2018-11-07 RX ADMIN — HYDROCODONE POLISTIREX AND CHLORPHENIRAMINE POLISTIREX 5 ML: 10; 8 SUSPENSION, EXTENDED RELEASE ORAL at 11:20

## 2018-11-07 NOTE — OCCUPATIONAL THERAPY NOTE
OT TREATMENT       11/07/18 1427   Restrictions/Precautions   Other Precautions O2;Fall Risk;Pain   Pain Assessment   Pain Assessment 0-10   Pain Score 6   Pain Type Acute pain   Pain Location Chest   ADL   Toileting Assistance  6  Modified independent   Transfers   Sit to Stand 7  Independent   Stand to Sit 7  Independent   Stand pivot 7  Independent   Functional Mobility   Functional Mobility 5  Supervision   Additional Comments 100' in hallway wihtout any device   Activity Tolerance   Activity Tolerance Patient tolerated treatment well   Assessment   Assessment Pt in bedside chair upon therapist arrival, wants to ambulate  Walks on unit without device  Noted SOGB but no LOB  Tolerates well  Needs to use bathroom upon return to room  Does so without any physical assistance or verbal cues  Plan   Treatment Interventions ADL retraining;Functional transfer training; Endurance training;Patient/family training;Equipment evaluation/education; Neuromuscular reeducation; Compensatory technique education; Energy conservation   Goal Expiration Date 11/19/18   Treatment Day 2   OT Frequency 3-5x/wk   Recommendation   OT Discharge Recommendation Home OT   Licensure   [place] License Number  Brunson Yumi OTR/JOANNE [id number]

## 2018-11-07 NOTE — DISCHARGE SUMMARY
Discharge Summary - [facility] Internal Medicine    Patient Information: Oh Francis 60 y o  male MRN: [id number]  Unit/Bed#: [address] [place] [address] Encounter: [id number]    Discharging Physician / Practitioner: Yoko Browning MD  PCP: Lai Ghosh MD  Admission Date: 10/31/2018  Discharge Date: 11/07/18    Reason for Admission: Shortness of Breath (shortness of breath x 1 week,coughing,like bronchitis,diaphoretic,thinks he had a fever last pm,hx  COPD  on 02 2liters with CPAP at night only)      Discharge Diagnoses:     Principal Problem:    COPD with exacerbation ([facility] )  Active Problems:    Bipolar affective disorder ([place] [address] )    Diabetes mellitus type 2, uncontrolled ([place] [address] )    SHAVONNE (obstructive sleep apnea)    Benign essential hypertension  Resolved Problems:    Acute on chronic respiratory failure with hypoxia (HCC)    Chest wall pain    Pneumonia    Sepsis ([place] [address] )        * COPD with exacerbation ([facility] )   Assessment & Plan    · Patient presenting with SOB, cough, hypoxia  · Hx severe restrictive lung disease with FEV1 of 2 1L or 41% Predicted  · CTA without evidence of pneumonia and PE  · Respiratory pathogen profile been negative  · Seen by Pulmonary, input appreciated  · Treated with frequent bronchodilators, chest PT, IV steroid, received azithromycin  · Required multiple medications for unrelenting cough  · Slowly improved, steroids were gradually tapered off  · Seen and followed closely by Pulmonary  · Clinically improved, shortness of breath and right chest wall significantly better    · Still with dry cough but better with medications  · Evaluated for home oxygen, requiring 2 L with activity and ambulation  · Patient will be discharged home on prednisone taper  · He will be continued on duo nebs Advair and supplemental oxygen  · Advised to continue symptomatic treatment for cough as advised  · Close follow-up with Pulmonary     Acute on chronic respiratory failure with hypoxia (HCC)-resolved as of 11/7/2018 Assessment & Plan    · Hx COPD on 2L NC at night  · Admitted with hypoxia, requiring NC  From COPD exac, PNA  · Required BIPAP overnight  · Cont NC for O2 sats >92%, wean as tolerated  · Lasix x2 given volume retention related to steroids with improvement  · Evaluated by oxygen requirement requiring 2 L supplementation     Chest wall pain-resolved as of 11/7/2018   Assessment & Plan    Pleuritic, associated with coughing  Associated with chest wall ecchymosis secondary to coughing  Improving  Continue symptomatic treatment     Benign essential hypertension   Assessment & Plan    · BP initially elevated on admission  · Cont home meds, BP improved     SHAVONNE (obstructive sleep apnea)   Assessment & Plan    · Cont CPAP at [facility]     Diabetes mellitus type 2, uncontrolled ([facility] )   Assessment & Plan    Lab Results   Component Value Date    HGBA1C 7 6 (H) 10/29/2018       Recent Labs      11/06/18   2036  11/07/18   0705  11/07/18   1152  11/07/18   1506   POCGLU  273*  197*  449*  390*       Blood Sugar Average: Last 72 hrs:  (P) 757 [id number]     Elevated secondary to steroids and poor dietary adherence  Continue home regimen with sliding scale  Resume Victoza and metformin after discharge     Bipolar affective disorder ([facility] )   Assessment & Plan    · Cont home meds: effexor, seroquel, remeron, cariprazine, buspar, xanax     Sepsis (HCC)-resolved as of 11/6/2018   Assessment & Plan    · RESOLVED  A/e/b leukocytosis, tachycardia, tachypnea  · CXR showed NAD  · CTA PE negative for PE or PNA  · Repeat CXR showed infiltrates, source is PNA  · LA elevated at 2 8, normalized  · Workup as noted above     Pneumonia-resolved as of 11/6/2018   Assessment & Plan    · Pt presenting with cough and subjective fevers  CAP vs viral PNA with atelectasis   · Initial CXR showed NAD, CTA chest negative for PNA  · Repeat CXR showed Bibasilar infiltrates and/or atelectasis    · FLu/RSV PCR negative  · resp pathogen profile negative  · Cont toradol PRN pleuritic chest pain  · Completed 5 day course of azithromycin  · Continue pain control, incentive spirometer         Consultations During Hospital Stay:  IP CONSULT TO PULMONOLOGY    Procedures Performed:     Home Oxygen Test:    **Medicare Guidelines require item(s) 1-5 on all ambulatory patients or 1 and 2 on non-ambulatory patients  1   Baseline SPO2 on Room Air at rest 89%                    2   SPO2 during exercise on Room Air 85 %  During exercise monitor SpO2  If SPO2 increases >=89% with ambulation do not add supplemental             oxygen  If <= 88% on room air add O2 via NC and titrate patient  Patient must be ambulated with O2 and titrated to > 88% with exertion          3  SPO2 on Oxygen at rest 94 % 2 lpm      4  SPO2 during exercise on Oxygen  90% a liter flow of 2 lpm      5  Exercise performed:          walking, duration 6 (min)           [x]  Supplemental Home Oxygen is indicated        Significant Findings:     · MRSA surveillance negative  · Influenza RSV PCR negative  · Respiratory pathogen profile negative  · Blood cultures negative  · Procalcitonin    Imaging while in hospital:    Xr Chest Portable    Result Date: 11/1/2018  Narrative: CHEST INDICATION:   progressive hypoxemia  COMPARISON:  10/31/2018 EXAM PERFORMED/VIEWS:  XR CHEST PORTABLE 1 image FINDINGS: Cardiomediastinal silhouette appears enlarged  No evidence of heart failure  Bibasilar infiltrates and/or atelectasis  Cannot exclude small pleural effusions  Osseous structures appear within normal limits for patient age  Impression: Bibasilar infiltrates and/or atelectasis  Workstation performed: [id number]     Xr Chest 1 View Portable    Result Date: 10/31/2018  Narrative: CHEST INDICATION:   SOB  Shortness of breath COMPARISON:  11/06/2017 EXAM PERFORMED/VIEWS:  XR CHEST PORTABLE 1 image FINDINGS: Cardiomediastinal silhouette appears enlarged    The pulmonary vessels are normal  Scarring at the right lung base  No infiltrates  Osseous structures appear within normal limits for patient age  Impression: Cardiomegaly  Scarring at the right lung base  Workstation performed: [id number]     Cta Ed Chest Pe Study    Result Date: 10/31/2018  Narrative: CTA - CHEST WITH IV CONTRAST - PULMONARY ANGIOGRAM INDICATION:   pleuritic chest pain  COMPARISON: None  TECHNIQUE: CTA examination of the chest was performed using angiographic technique according to a protocol specifically tailored to evaluate for pulmonary embolism  Axial, sagittal, and coronal 2D reformatted images were created from the source data and  submitted for interpretation  In addition, coronal 3D MIP postprocessing was performed on the acquisition scanner  Radiation dose length product (DLP) for this visit:  791 61 mGy-cm   This examination, like all CT scans performed in the [facility], was performed utilizing techniques to minimize radiation dose exposure, including the use of iterative  reconstruction and automated exposure control  IV Contrast:  100 mL of iohexol (OMNIPAQUE)  FINDINGS: PULMONARY ARTERIAL TREE:  No pulmonary embolus is seen  LUNGS:  Lungs are clear  There is no tracheal or endobronchial lesion  PLEURA:  Unremarkable  HEART/GREAT VESSELS:  Cardiomegaly evident  MEDIASTINUM AND CHRISTOPHER:  Unremarkable  CHEST WALL AND LOWER NECK:   Unremarkable  VISUALIZED STRUCTURES IN THE UPPER ABDOMEN:  Enlarged fatty liver evident  Status post cholecystectomy  Fatty replaced pancreas  OSSEOUS STRUCTURES:  No acute fracture or destructive osseous lesion  Impression: No PE   Workstation performed: [id number]       Incidental Findings:   · * none    Test Results Pending at Discharge (will require follow up):   · As per After Visit Summary     Outpatient Tests Requested:  · Monitoring of blood sugar    Complications:  None    Hospital Course:     Alix Borja is a 60 y o  male patient with multiple comorbidities including SHAVONNE/alveolar hypoventilation on CPAP, COPD on 2 L of supplemental oxygen at bedtime, hypertension, bipolar disorder, diabetes mellitus type 2, bipolar disorder who originally presented to the hospital on 10/31/2018 due to nonproductive cough and shortness of breath with associated wheezing and subjective fever  He also reported right-sided chest wall pain worse with coughing  Patient was evaluated emergency room, noted to be tachypneic tachycardic with room air saturation of 88%  Patient underwent chest x-ray and CTA with did not reveal any evidence of pneumonia or pulmonary embolism  Subsequently admitted for further evaluation workup  Please see above list of diagnoses and related plan for additional information  Condition at Discharge: fair     Discharge Day Visit / Exam:     Subjective:  Feels much better  Breathing is better Still with ongoing cough mainly dry  Right-sided chest wall pain has significantly improved  Ambulated well with physical therapy  Poor adherence with diet restriction contributing to hyperglycemia, patient was educated    Vitals: Blood Pressure: (!) 174/74 (11/07/18 1358)  Pulse: 83 (11/07/18 1358)  Temperature: 98 2 °F (36 8 °C) (11/07/18 1358)  Temp Source: Oral (11/07/18 1358)  Respirations: 19 (11/07/18 1358)  Height: 5' 11" (180 3 cm) (10/31/18 0854)  Weight - Scale: 132 kg (290 lb) (10/31/18 0854)  SpO2: 94 % (11/07/18 1519)  Exam:   Physical Exam   Constitutional: He is oriented to person, place, and time  He appears well-developed  No distress  Morbidly obese   HENT:   Head: Normocephalic and atraumatic  Eyes: Pupils are equal, round, and reactive to light  Conjunctivae are normal    Neck: Normal range of motion  Neck supple  Cardiovascular: Normal rate, regular rhythm and normal heart sounds  Pulmonary/Chest: Effort normal  No respiratory distress  He has decreased breath sounds  He has no wheezes   He has no rhonchi  He has no rales  He exhibits no tenderness  Coarse breath sounds   Ecchymosis is gradually resolving    Abdominal: Soft  Bowel sounds are normal  He exhibits no distension  There is no tenderness  There is no rebound and no guarding  Musculoskeletal: He exhibits edema (Improved)  Neurological: He is alert and oriented to person, place, and time  No cranial nerve deficit  Skin: Skin is warm and dry  No rash noted  Discharge instructions/Information to patient and family:(Discharge Medications and Follow up):   See after visit summary for information provided to patient and family  Provisions for Follow-Up Care:  See after visit summary for information related to follow-up care and any pertinent home health orders  Disposition: Home with [facility]    Planned Readmission:  No     Discharge Statement:  I spent 45 minutes discharging the patient  This time was spent on the day of discharge  I had direct contact with the patient on the day of discharge  Greater than 50% of the total time was spent examining patient, answering all patient questions, arranging and discussing plan of care with patient as well as directly providing post-discharge instructions  Additional time then spent on discharge activities  Coordinated with Pulmonary, Dr Severa Belts at the time of discharge    Discharge Medications:  See after visit summary for reconciled discharge medications provided to patient and family  ** Please Note: "This note has been constructed using a voice recognition system  Therefore there may be syntax, spelling, and/or grammatical errors   Please call if you have any questions  "**

## 2018-11-07 NOTE — ASSESSMENT & PLAN NOTE
Pleuritic, associated with coughing  Associated with chest wall ecchymosis secondary to coughing  Improving  Continue symptomatic treatment

## 2018-11-07 NOTE — PROGRESS NOTES
Progress Note - Pulmonary   Anna Model 60 y o  male MRN: [id number]  Unit/Bed#: [address] [place] [address] Encounter: [id number]      Assessment/Plan:   -acute COPD exacerbation with chronic hypoxemic respiratory failure:  -patient qualifies for home oxygen > d/c w/ 2 L NC  qualifies for 02 as of yesterdays assessement  -currently on 2 L nasal cannula  -remain out of bed  -patient tolerating and appreciative of chest vest therapy > aids mucus expectoration > continue while in hospital  -transition to PO prednisone > [facility]  -finish 5 days of azithromycin/2 days of ceftriaxone for tracheobronchitis  -chest wall pain:  -improved  -prn motrin as outpt if needed  -SHAVONNE:  -patient prefers BiPAP here in hospital > tolerated BiPAP overnight  -should resume CPAP at home  -morbid obesity with BMI 40 45  -goal for weight loss      -Plan for d/c today   -needs f/u in outpatient setting in 1-2 weeks   -prednisone taper as outpatient 40>30>20>10 x 3 ea  -needs order for standing oxygen    ______________________________________________________________________    Subjective: Pt seen and examined at bedside  No complaints  Feels better today     Tele Events:     Vitals:   Temp:  [96 °F (35 6 °C)-98 4 °F (36 9 °C)] 98 3 °F (36 8 °C)  HR:  [71-74] 71  Resp:  [18-20] 19  BP: (126-182)/(66-91) 126/67  Weight (last 2 days)     None        Oxygen Therapy  SpO2: 93 %  O2 Flow Rate (L/min): 2 L/min    IV Infusions:       Nutrition:        Diet Orders            Start     Ordered    10/31/18 1259  Diet Campos/CHO Controlled; Consistent Carbohydrate Diet Level 2 (5 carb servings/75 grams CHO/meal)  Diet effective now     Question Answer Comment   Diet Type Campos/CHO Controlled    Campos/CHO Controlled Consistent Carbohydrate Diet Level 2 (5 carb servings/75 grams CHO/meal)    RD to adjust diet per protocol?  Yes        10/31/18 1258    10/31/18 1227  Room Service  Once     Question:  Type of Service  Answer:  Room Service-Appropriate 10/31/18 1227          Ins/Outs:   I/O       11/05 0701 - 11/06 0700 11/06 0701 - 11/07 0700 11/07 0701 - 11/08 0700    Urine (mL/kg/hr) 800 (0 3)      Total Output 800        Net -800                     Lines/Drains:  Invasive Devices     Peripheral Intravenous Line            Peripheral IV 11/06/18 Right Forearm 1 day                 Active medications:  Scheduled Meds:  Current Facility-Administered Medications:  acetaminophen 650 mg Oral Q6H PRN Krishna Garrison MD   acetylcysteine 3 mL Nebulization Q8H Caleb Frost PA-C   ALPRAZolam 2 mg Oral HS PRN Krishna Garrison MD   amLODIPine 5 mg Oral Q12H 3630 Lewis Paul MD   aspirin 81 mg Oral QAM Krishna Garrison MD   atorvastatin 20 mg Oral Daily With Alin Martino MD   benzonatate 200 mg Oral TID Krishna Garrison MD   busPIRone 15 mg Oral BID Krishna Garrison MD   calcium carbonate 1,000 mg Oral Daily PRN Krishna Garrison MD   Dapagliflozin Propanediol 10 mg Oral QAM Krishna Garrison MD   ergocalciferol 50,000 Units Oral Once per day on Mon Thu Krishna Garrison MD   fluticasone-vilanterol 1 puff Inhalation Daily Krishna Garrison MD   HYDROcodone-acetaminophen 1 tablet Oral Q6H PRN Dorie Goltz, DO   hydrocodone-chlorpheniramine polistirex 5 mL Oral Q12H PRN Krishna Garrison MD   ibuprofen 400 mg Oral Q6H PRN Krishna Garrison MD   insulin glargine 60 Units Subcutaneous HS Krishna Garrison MD   insulin lispro 1-5 Units Subcutaneous HS Krishna Garrison MD   insulin lispro 1-6 Units Subcutaneous TID AC Krishna Garrison MD   insulin lispro 17 Units Subcutaneous TID With Meals Krishna Garrison MD   ipratropium 0 5 mg Nebulization Q4H Krishna Garrison MD   levalbuterol 0 63 mg Nebulization Q2H PRN rKishna Garrison MD   levalbuterol 1 25 mg Nebulization Q4H Krishna Garrison MD   lisinopril 20 mg Oral Daily Krishna Garrison MD   methylPREDNISolone sodium succinate 20 mg Intravenous Q12H [facility] Caleb Frost PA-C   metoprolol tartrate 25 mg Oral Q12H [address], MD   mirtazapine 45 mg Oral HS Krishna Garrison MD   ondansetron 4 mg Intravenous Q6H PRN Martha Connie Stevenson MD   pantoprazole 40 mg Oral Early Morning Linn Desai MD   pregabalin 50 mg Oral TID Linn Desai MD   QUEtiapine 400 mg Oral HS Linn Desai MD   venlafaxine 150 mg Oral QAM Linn Desai MD     PRN Meds:  acetaminophen 650 mg Q6H PRN   ALPRAZolam 2 mg HS PRN   calcium carbonate 1,000 mg Daily PRN   HYDROcodone-acetaminophen 1 tablet Q6H PRN   hydrocodone-chlorpheniramine polistirex 5 mL Q12H PRN   ibuprofen 400 mg Q6H PRN   levalbuterol 0 63 mg Q2H PRN   ondansetron 4 mg Q6H PRN     ____________________________________________________________________      Physical Exam   Constitutional: He is oriented to person, place, and time  He appears well-developed  Obese body habitus   HENT:   Head: Normocephalic and atraumatic  Eyes: Pupils are equal, round, and reactive to light  Conjunctivae are normal    Neck: Normal range of motion  Neck supple  Cardiovascular: Normal rate, regular rhythm and normal heart sounds  Exam reveals no gallop and no friction rub  No murmur heard  Pulmonary/Chest: Effort normal  No accessory muscle usage  No tachypnea  No respiratory distress  He has no wheezes  He has no rhonchi  He has no rales  He exhibits no tenderness  Conversational dyspnea has resolved    Mild occasional cough   Abdominal: Soft  Bowel sounds are normal    Obese abdomen   Musculoskeletal: Normal range of motion  He exhibits no edema  Neurological: He is alert and oriented to person, place, and time  Skin: Skin is warm and dry     Psychiatric: He has a normal mood and affect            ____________________________________________________________________    Labs:   CBC:   Results from last 7 days  Lab Units 11/07/18  0555 11/06/18  0606 11/05/18  0544   WBC Thousand/uL 15 16* 13 33* 11 68*   HEMOGLOBIN g/dL 14 8 15 0 14 6   HEMATOCRIT % 44 3 44 5 44 2   MCV fL 94 93 94   PLATELETS Thousands/uL 193 190 176     CMP:   Results from last 7 days  Lab Units 11/07/18  0555 11/06/18  0606 11/05/18  0544 POTASSIUM mmol/L 4 6 5 0 5 4*   CHLORIDE mmol/L 96* 96* 96*   CO2 mmol/L 29 28 26   BUN mg/dL 31* 31* 31*   CREATININE mg/dL 0 96 0 87 0 87   CALCIUM mg/dL 9 0 9 5 9 6   EGFR ml/min/1 73sq m 86 94 94     Magnesium:   Results from last 7 days  Lab Units 18  0617   MAGNESIUM mg/dL 2 2     Phosphorous:     Troponin:     PT/INR:     Lactic Acid:   Results from last 7 days  Lab Units 18  0212 18  0026   LACTIC ACID mmol/L 1 8 2 7*     BNP:     TSH:     Procalcitonin: Invalid input(s): PROCALCITONIN               Home Oxygen Qualifying Test         Patient name: Chris Ott        : 1960   Date of Test:  2018             Diagnosis:       Home Oxygen Test:    **Medicare Guidelines require item(s) 1-5 on all ambulatory patients or 1 and 2 on non-ambulatory patients  1   Baseline SPO2 on Room Air at rest 89%                    2   SPO2 during exercise on Room Air 85 %  During exercise monitor SpO2  If SPO2 increases >=89% with ambulation do not add supplemental             oxygen  If <= 88% on room air add O2 via NC and titrate patient  Patient must be ambulated with O2 and titrated to > 88% with exertion          3  SPO2 on Oxygen at rest 94 % 2 lpm      4  SPO2 during exercise on Oxygen  90% a liter flow of 2 lpm      5  Exercise performed:          walking, duration 6 (min)           [x]  Supplemental Home Oxygen is indicated      []  Client does not qualify for home oxygen         Respiratory Additional Notes- Patient only able to walk 6 min complain of SOB     Chavo Pack, RT         Imaging:   XR chest portable   Final Result by Nikky Jack MD ([id number])      Bibasilar infiltrates and/or atelectasis  Workstation performed: [id number]         CTA ED chest PE study   Final Result by Bart Sharma MD (10/31 1058)   No PE                       Workstation performed: [id number]         XR chest 1 view portable   Final Result by Phillip Hudson MD (70/56 [id number])   Cardiomegaly  Scarring at the right lung base  Workstation performed: [id number]                 Micro: Lab Results   Component Value Date    BLOODCX No Growth After 5 Days  10/31/2018    BLOODCX No Growth After 5 Days  10/31/2018    BLOODCX No Growth After 5 Days  09/06/2017    URINECX 8428-4880 cfu/ml Mixed Contaminants X2 03/20/2017    URINECX No Growth <1000 cfu/mL 11/27/2016    URINECX No Growth <1000 cfu/mL 09/02/2016    SPUTUMCULTUR Test not performed  Suggest repeat specimen   11/08/2017    SPUTUMCULTUR 1+ Growth of Staphylococcus aureus 03/20/2017    SPUTUMCULTUR 1+ Growth of Mixed Respiratory Francine 03/20/2017    MRSACULTURE  10/31/2018     No Methicillin Resistant Staphlyococcus aureus (MRSA) isolated        Results from last 7 days  Lab Units 10/31/18  1447   INFLUENZA B PCR  None Detected   RSV PCR  None Detected           Code Status: Level 1 - Full Code

## 2018-11-08 ENCOUNTER — PATIENT OUTREACH (OUTPATIENT)
Dept: CASE MANAGEMENT | Facility: HOSPITAL | Age: 58
End: 2018-11-08

## 2018-11-08 NOTE — SOCIAL WORK
PT TO D/C TO HOME, HAS O2 AT  , NOW QUALIFIES FOR O2 WITH ACTIVITY, CM CONTACTED [facility], PORTABLE TANK DELIVERED TO PT ROOM  REFERRAL WAS MADE TO [facility], ALL INFO SENT TO THEM VIA [employer], THEY WILL SEE HIM UPON D/C TO HOME

## 2018-11-08 NOTE — NURSING NOTE
Patient left via wheelchair to home with transport oxygen tank  Doscharge instructions reviewed with patient  Patient verbalized understanding  Prescriptions given to patient  IV access removed prior to discharge  All personal belongings taken with patient  Patient vaccinated with flu and pneumo prior to discharge

## 2018-11-14 DIAGNOSIS — R53.81 GENERAL PHYSICAL DETERIORATION: Primary | ICD-10-CM

## 2018-11-14 NOTE — PROGRESS NOTES
Patient needs physical therapy is very weak and general physical deterioration due to his recent acute respiratory illness and COPD exacerbation

## 2018-11-15 DIAGNOSIS — R05.9 COUGH: ICD-10-CM

## 2018-11-15 DIAGNOSIS — R07.1 PAIN OF ANTERIOR CHEST WALL WITH RESPIRATION: Primary | ICD-10-CM

## 2018-11-15 RX ORDER — HYDROCODONE BITARTRATE AND ACETAMINOPHEN 5; 325 MG/1; MG/1
1 TABLET ORAL 2 TIMES DAILY PRN
Qty: 20 TABLET | Refills: 0 | Status: SHIPPED | OUTPATIENT
Start: 2018-11-15 | End: 2018-12-11

## 2018-11-15 NOTE — PROGRESS NOTES
Elisa Ruggiero is still having some cough and chest wall discomfort from his coughing  He has expectorate a small amount brown mucus and occasional bring up a small amount of bright red blood  His breathing is better than when he 1st was admitted to the hospital recently but is not back to baseline yet  He had recent [place] from October 31st to November 7th for acute exacerbation COPD and acute bronchitis  He did have some ecchymosis over his right anterior chest wall from his coughing  To given relief of the cough I did prescribe Vicodin he can take 1 tablet twice a day as needed for cough and chest wall pain  He does have a follow-up visit in our office next week      I advised making taking over-the-counter cough medication

## 2018-11-16 ENCOUNTER — APPOINTMENT (EMERGENCY)
Dept: RADIOLOGY | Facility: HOSPITAL | Age: 58
DRG: 189 | End: 2018-11-16
Payer: MEDICARE

## 2018-11-16 ENCOUNTER — HOSPITAL ENCOUNTER (INPATIENT)
Facility: HOSPITAL | Age: 58
LOS: 3 days | Discharge: NURSING, REHAB OR LONG-TERM CARE | DRG: 189 | End: 2018-11-19
Attending: EMERGENCY MEDICINE | Admitting: INTERNAL MEDICINE
Payer: MEDICARE

## 2018-11-16 ENCOUNTER — APPOINTMENT (INPATIENT)
Dept: RADIOLOGY | Facility: HOSPITAL | Age: 58
DRG: 189 | End: 2018-11-16
Payer: MEDICARE

## 2018-11-16 DIAGNOSIS — E11.65 UNCONTROLLED TYPE 2 DIABETES MELLITUS WITH HYPERGLYCEMIA (HCC): Chronic | ICD-10-CM

## 2018-11-16 DIAGNOSIS — R06.03 RESPIRATORY DISTRESS: ICD-10-CM

## 2018-11-16 DIAGNOSIS — J18.9 PNEUMONIA: ICD-10-CM

## 2018-11-16 DIAGNOSIS — E11.65 TYPE 2 DIABETES MELLITUS WITH HYPERGLYCEMIA, WITH LONG-TERM CURRENT USE OF INSULIN (HCC): ICD-10-CM

## 2018-11-16 DIAGNOSIS — J44.1 COPD WITH EXACERBATION (HCC): ICD-10-CM

## 2018-11-16 DIAGNOSIS — E11.10 DKA (DIABETIC KETOACIDOSES): Primary | ICD-10-CM

## 2018-11-16 DIAGNOSIS — E66.01 MORBID OBESITY (HCC): ICD-10-CM

## 2018-11-16 DIAGNOSIS — Z79.4 TYPE 2 DIABETES MELLITUS WITH HYPERGLYCEMIA, WITH LONG-TERM CURRENT USE OF INSULIN (HCC): ICD-10-CM

## 2018-11-16 LAB
ACETONE SERPL-MCNC: ABNORMAL MG/DL
ALBUMIN SERPL BCP-MCNC: 2.8 G/DL (ref 3.5–5)
ALBUMIN SERPL BCP-MCNC: 3.1 G/DL (ref 3.5–5)
ALP SERPL-CCNC: 73 U/L (ref 46–116)
ALP SERPL-CCNC: 84 U/L (ref 46–116)
ALT SERPL W P-5'-P-CCNC: 23 U/L (ref 12–78)
ALT SERPL W P-5'-P-CCNC: 28 U/L (ref 12–78)
ANION GAP SERPL CALCULATED.3IONS-SCNC: 11 MMOL/L (ref 4–13)
ANION GAP SERPL CALCULATED.3IONS-SCNC: 15 MMOL/L (ref 4–13)
ANION GAP SERPL CALCULATED.3IONS-SCNC: 21 MMOL/L (ref 4–13)
APTT PPP: 25 SECONDS (ref 24–33)
ARTERIAL PATENCY WRIST A: YES
AST SERPL W P-5'-P-CCNC: 12 U/L (ref 5–45)
AST SERPL W P-5'-P-CCNC: 5 U/L (ref 5–45)
ATRIAL RATE: 113 BPM
BACTERIA UR QL AUTO: ABNORMAL /HPF
BASE EXCESS BLDA CALC-SCNC: -7.2 MMOL/L
BASOPHILS # BLD MANUAL: 0 THOUSAND/UL (ref 0–0.1)
BASOPHILS # BLD MANUAL: 0.17 THOUSAND/UL (ref 0–0.1)
BASOPHILS NFR MAR MANUAL: 0 % (ref 0–1)
BASOPHILS NFR MAR MANUAL: 1 % (ref 0–1)
BILIRUB SERPL-MCNC: 0.3 MG/DL (ref 0.2–1)
BILIRUB SERPL-MCNC: 0.4 MG/DL (ref 0.2–1)
BILIRUB UR QL STRIP: NEGATIVE
BODY TEMPERATURE: 99.9 DEGREES FEHRENHEIT
BUN SERPL-MCNC: 21 MG/DL (ref 5–25)
BUN SERPL-MCNC: 23 MG/DL (ref 5–25)
BUN SERPL-MCNC: 30 MG/DL (ref 5–25)
CALCIUM SERPL-MCNC: 8.3 MG/DL (ref 8.3–10.1)
CALCIUM SERPL-MCNC: 8.7 MG/DL (ref 8.3–10.1)
CALCIUM SERPL-MCNC: 8.8 MG/DL (ref 8.3–10.1)
CHLORIDE SERPL-SCNC: 102 MMOL/L (ref 100–108)
CHLORIDE SERPL-SCNC: 103 MMOL/L (ref 100–108)
CHLORIDE SERPL-SCNC: 95 MMOL/L (ref 100–108)
CLARITY UR: CLEAR
CO2 SERPL-SCNC: 17 MMOL/L (ref 21–32)
CO2 SERPL-SCNC: 20 MMOL/L (ref 21–32)
CO2 SERPL-SCNC: 25 MMOL/L (ref 21–32)
COLOR UR: ABNORMAL
CREAT SERPL-MCNC: 0.98 MG/DL (ref 0.6–1.3)
CREAT SERPL-MCNC: 1.02 MG/DL (ref 0.6–1.3)
CREAT SERPL-MCNC: 1.25 MG/DL (ref 0.6–1.3)
EOSINOPHIL # BLD MANUAL: 0 THOUSAND/UL (ref 0–0.4)
EOSINOPHIL # BLD MANUAL: 0.34 THOUSAND/UL (ref 0–0.4)
EOSINOPHIL NFR BLD MANUAL: 0 % (ref 0–6)
EOSINOPHIL NFR BLD MANUAL: 2 % (ref 0–6)
ERYTHROCYTE [DISTWIDTH] IN BLOOD BY AUTOMATED COUNT: 12.6 % (ref 11.6–15.1)
ERYTHROCYTE [DISTWIDTH] IN BLOOD BY AUTOMATED COUNT: 12.8 % (ref 11.6–15.1)
FLUAV AG SPEC QL: NORMAL
FLUBV AG SPEC QL: NORMAL
GFR SERPL CREATININE-BSD FRML MDRD: 63 ML/MIN/1.73SQ M
GFR SERPL CREATININE-BSD FRML MDRD: 80 ML/MIN/1.73SQ M
GFR SERPL CREATININE-BSD FRML MDRD: 84 ML/MIN/1.73SQ M
GLUCOSE SERPL-MCNC: 112 MG/DL (ref 65–140)
GLUCOSE SERPL-MCNC: 125 MG/DL (ref 65–140)
GLUCOSE SERPL-MCNC: 156 MG/DL (ref 65–140)
GLUCOSE SERPL-MCNC: 163 MG/DL (ref 65–140)
GLUCOSE SERPL-MCNC: 189 MG/DL (ref 65–140)
GLUCOSE SERPL-MCNC: 198 MG/DL (ref 65–140)
GLUCOSE SERPL-MCNC: 220 MG/DL (ref 65–140)
GLUCOSE SERPL-MCNC: 244 MG/DL (ref 65–140)
GLUCOSE SERPL-MCNC: 248 MG/DL (ref 65–140)
GLUCOSE SERPL-MCNC: 279 MG/DL (ref 65–140)
GLUCOSE SERPL-MCNC: 336 MG/DL (ref 65–140)
GLUCOSE SERPL-MCNC: 385 MG/DL (ref 65–140)
GLUCOSE SERPL-MCNC: 400 MG/DL (ref 65–140)
GLUCOSE SERPL-MCNC: 58 MG/DL (ref 65–140)
GLUCOSE UR STRIP-MCNC: ABNORMAL MG/DL
HCO3 BLDA-SCNC: 16.4 MMOL/L (ref 22–28)
HCT VFR BLD AUTO: 39.8 % (ref 36.5–49.3)
HCT VFR BLD AUTO: 43.1 % (ref 36.5–49.3)
HGB BLD-MCNC: 12.6 G/DL (ref 12–17)
HGB BLD-MCNC: 13.7 G/DL (ref 12–17)
HGB UR QL STRIP.AUTO: NEGATIVE
INR PPP: 0.91 (ref 0.86–1.16)
KETONES UR STRIP-MCNC: ABNORMAL MG/DL
L PNEUMO1 AG UR QL IA.RAPID: NEGATIVE
LACTATE SERPL-SCNC: 1.8 MMOL/L (ref 0.5–2)
LEUKOCYTE ESTERASE UR QL STRIP: ABNORMAL
LYMPHOCYTES # BLD AUTO: 14 % (ref 14–44)
LYMPHOCYTES # BLD AUTO: 2.4 THOUSAND/UL (ref 0.6–4.47)
LYMPHOCYTES # BLD AUTO: 24 % (ref 14–44)
LYMPHOCYTES # BLD AUTO: 3.41 THOUSAND/UL (ref 0.6–4.47)
MAGNESIUM SERPL-MCNC: 1.8 MG/DL (ref 1.6–2.6)
MAGNESIUM SERPL-MCNC: 2 MG/DL (ref 1.6–2.6)
MCH RBC QN AUTO: 30.7 PG (ref 26.8–34.3)
MCH RBC QN AUTO: 30.8 PG (ref 26.8–34.3)
MCHC RBC AUTO-ENTMCNC: 31.7 G/DL (ref 31.4–37.4)
MCHC RBC AUTO-ENTMCNC: 31.8 G/DL (ref 31.4–37.4)
MCV RBC AUTO: 97 FL (ref 82–98)
MCV RBC AUTO: 97 FL (ref 82–98)
MONOCYTES # BLD AUTO: 0.14 THOUSAND/UL (ref 0–1.22)
MONOCYTES # BLD AUTO: 0.17 THOUSAND/UL (ref 0–1.22)
MONOCYTES NFR BLD: 1 % (ref 4–12)
MONOCYTES NFR BLD: 1 % (ref 4–12)
NEUTROPHILS # BLD MANUAL: 10.65 THOUSAND/UL (ref 1.85–7.62)
NEUTROPHILS # BLD MANUAL: 14.06 THOUSAND/UL (ref 1.85–7.62)
NEUTS BAND NFR BLD MANUAL: 5 % (ref 0–8)
NEUTS BAND NFR BLD MANUAL: 6 % (ref 0–8)
NEUTS SEG NFR BLD AUTO: 69 % (ref 43–75)
NEUTS SEG NFR BLD AUTO: 77 % (ref 43–75)
NITRITE UR QL STRIP: NEGATIVE
NON VENT- BIPAP: ABNORMAL
NON-SQ EPI CELLS URNS QL MICRO: ABNORMAL /HPF
NRBC BLD AUTO-RTO: 0 /100 WBCS
NRBC BLD AUTO-RTO: 0 /100 WBCS
NT-PROBNP SERPL-MCNC: 82 PG/ML
O2 CT BLDA-SCNC: 17.7 ML/DL (ref 16–23)
OXYHGB MFR BLDA: 92.3 % (ref 94–97)
P AXIS: 39 DEGREES
PCO2 BLDA: 28.2 MM HG (ref 36–44)
PCO2 TEMP ADJ BLDA: 29.1 MM HG (ref 36–44)
PH BLD: 7.37 [PH] (ref 7.35–7.45)
PH BLDA: 7.38 [PH] (ref 7.35–7.45)
PH UR STRIP.AUTO: 5 [PH] (ref 5–9)
PHOSPHATE SERPL-MCNC: 3.1 MG/DL (ref 2.7–4.5)
PHOSPHATE SERPL-MCNC: 4.6 MG/DL (ref 2.7–4.5)
PLATELET # BLD AUTO: 210 THOUSANDS/UL (ref 149–390)
PLATELET # BLD AUTO: 230 THOUSANDS/UL (ref 149–390)
PLATELET BLD QL SMEAR: ADEQUATE
PLATELET BLD QL SMEAR: ADEQUATE
PMV BLD AUTO: 9.3 FL (ref 8.9–12.7)
PMV BLD AUTO: 9.4 FL (ref 8.9–12.7)
PO2 BLD: 76.7 MM HG (ref 75–129)
PO2 BLDA: 73.2 MM HG (ref 75–129)
POTASSIUM SERPL-SCNC: 3.8 MMOL/L (ref 3.5–5.3)
POTASSIUM SERPL-SCNC: 3.9 MMOL/L (ref 3.5–5.3)
POTASSIUM SERPL-SCNC: 4.6 MMOL/L (ref 3.5–5.3)
PR INTERVAL: 132 MS
PROCALCITONIN SERPL-MCNC: <0.05 NG/ML
PROT SERPL-MCNC: 6.3 G/DL (ref 6.4–8.2)
PROT SERPL-MCNC: 7 G/DL (ref 6.4–8.2)
PROT UR STRIP-MCNC: NEGATIVE MG/DL
PROTHROMBIN TIME: 9.6 SECONDS (ref 9.4–11.7)
QRS AXIS: 70 DEGREES
QRSD INTERVAL: 86 MS
QT INTERVAL: 328 MS
QTC INTERVAL: 449 MS
RBC # BLD AUTO: 4.11 MILLION/UL (ref 3.88–5.62)
RBC # BLD AUTO: 4.45 MILLION/UL (ref 3.88–5.62)
RBC #/AREA URNS AUTO: ABNORMAL /HPF
RBC MORPH BLD: NORMAL
RBC MORPH BLD: NORMAL
RSV B RNA SPEC QL NAA+PROBE: NORMAL
S PNEUM AG UR QL: NEGATIVE
SODIUM SERPL-SCNC: 133 MMOL/L (ref 136–145)
SODIUM SERPL-SCNC: 138 MMOL/L (ref 136–145)
SODIUM SERPL-SCNC: 138 MMOL/L (ref 136–145)
SP GR UR STRIP.AUTO: 1.01 (ref 1–1.03)
SPECIMEN SOURCE: ABNORMAL
T WAVE AXIS: 41 DEGREES
TOTAL CELLS COUNTED SPEC: 100
TOTAL CELLS COUNTED SPEC: 100
TOXIC GRANULES BLD QL SMEAR: PRESENT
TOXIC GRANULES BLD QL SMEAR: PRESENT
TROPONIN I SERPL-MCNC: <0.02 NG/ML
TSH SERPL DL<=0.05 MIU/L-ACNC: 0.55 UIU/ML (ref 0.36–3.74)
UROBILINOGEN UR QL STRIP.AUTO: 0.2 E.U./DL
VENT BIPAP FIO2: 35 %
VENTRICULAR RATE: 113 BPM
WBC # BLD AUTO: 14.2 THOUSAND/UL (ref 4.31–10.16)
WBC # BLD AUTO: 17.15 THOUSAND/UL (ref 4.31–10.16)
WBC #/AREA URNS AUTO: ABNORMAL /HPF

## 2018-11-16 PROCEDURE — 80053 COMPREHEN METABOLIC PANEL: CPT | Performed by: NURSE PRACTITIONER

## 2018-11-16 PROCEDURE — 82948 REAGENT STRIP/BLOOD GLUCOSE: CPT

## 2018-11-16 PROCEDURE — 81001 URINALYSIS AUTO W/SCOPE: CPT | Performed by: EMERGENCY MEDICINE

## 2018-11-16 PROCEDURE — G8979 MOBILITY GOAL STATUS: HCPCS

## 2018-11-16 PROCEDURE — 87081 CULTURE SCREEN ONLY: CPT | Performed by: NURSE PRACTITIONER

## 2018-11-16 PROCEDURE — 96375 TX/PRO/DX INJ NEW DRUG ADDON: CPT

## 2018-11-16 PROCEDURE — 93010 ELECTROCARDIOGRAM REPORT: CPT | Performed by: INTERNAL MEDICINE

## 2018-11-16 PROCEDURE — 84145 PROCALCITONIN (PCT): CPT | Performed by: NURSE PRACTITIONER

## 2018-11-16 PROCEDURE — 84484 ASSAY OF TROPONIN QUANT: CPT | Performed by: EMERGENCY MEDICINE

## 2018-11-16 PROCEDURE — 87631 RESP VIRUS 3-5 TARGETS: CPT | Performed by: NURSE PRACTITIONER

## 2018-11-16 PROCEDURE — 94760 N-INVAS EAR/PLS OXIMETRY 1: CPT

## 2018-11-16 PROCEDURE — 93005 ELECTROCARDIOGRAM TRACING: CPT

## 2018-11-16 PROCEDURE — 83735 ASSAY OF MAGNESIUM: CPT | Performed by: NURSE PRACTITIONER

## 2018-11-16 PROCEDURE — 80053 COMPREHEN METABOLIC PANEL: CPT | Performed by: EMERGENCY MEDICINE

## 2018-11-16 PROCEDURE — 84484 ASSAY OF TROPONIN QUANT: CPT | Performed by: NURSE PRACTITIONER

## 2018-11-16 PROCEDURE — 85730 THROMBOPLASTIN TIME PARTIAL: CPT | Performed by: EMERGENCY MEDICINE

## 2018-11-16 PROCEDURE — 84443 ASSAY THYROID STIM HORMONE: CPT | Performed by: EMERGENCY MEDICINE

## 2018-11-16 PROCEDURE — 82009 KETONE BODYS QUAL: CPT | Performed by: EMERGENCY MEDICINE

## 2018-11-16 PROCEDURE — G8997 SWALLOW GOAL STATUS: HCPCS

## 2018-11-16 PROCEDURE — 85027 COMPLETE CBC AUTOMATED: CPT | Performed by: NURSE PRACTITIONER

## 2018-11-16 PROCEDURE — 87147 CULTURE TYPE IMMUNOLOGIC: CPT | Performed by: EMERGENCY MEDICINE

## 2018-11-16 PROCEDURE — 83735 ASSAY OF MAGNESIUM: CPT | Performed by: EMERGENCY MEDICINE

## 2018-11-16 PROCEDURE — 85610 PROTHROMBIN TIME: CPT | Performed by: EMERGENCY MEDICINE

## 2018-11-16 PROCEDURE — 83605 ASSAY OF LACTIC ACID: CPT | Performed by: EMERGENCY MEDICINE

## 2018-11-16 PROCEDURE — G8996 SWALLOW CURRENT STATUS: HCPCS

## 2018-11-16 PROCEDURE — 85007 BL SMEAR W/DIFF WBC COUNT: CPT | Performed by: NURSE PRACTITIONER

## 2018-11-16 PROCEDURE — G8978 MOBILITY CURRENT STATUS: HCPCS

## 2018-11-16 PROCEDURE — 85007 BL SMEAR W/DIFF WBC COUNT: CPT | Performed by: EMERGENCY MEDICINE

## 2018-11-16 PROCEDURE — 94660 CPAP INITIATION&MGMT: CPT

## 2018-11-16 PROCEDURE — 99223 1ST HOSP IP/OBS HIGH 75: CPT | Performed by: INTERNAL MEDICINE

## 2018-11-16 PROCEDURE — 84100 ASSAY OF PHOSPHORUS: CPT | Performed by: NURSE PRACTITIONER

## 2018-11-16 PROCEDURE — 94668 MNPJ CHEST WALL SBSQ: CPT

## 2018-11-16 PROCEDURE — 83880 ASSAY OF NATRIURETIC PEPTIDE: CPT | Performed by: EMERGENCY MEDICINE

## 2018-11-16 PROCEDURE — 36415 COLL VENOUS BLD VENIPUNCTURE: CPT | Performed by: EMERGENCY MEDICINE

## 2018-11-16 PROCEDURE — 85027 COMPLETE CBC AUTOMATED: CPT | Performed by: EMERGENCY MEDICINE

## 2018-11-16 PROCEDURE — 96365 THER/PROPH/DIAG IV INF INIT: CPT

## 2018-11-16 PROCEDURE — 71045 X-RAY EXAM CHEST 1 VIEW: CPT

## 2018-11-16 PROCEDURE — 82805 BLOOD GASES W/O2 SATURATION: CPT | Performed by: EMERGENCY MEDICINE

## 2018-11-16 PROCEDURE — 94640 AIRWAY INHALATION TREATMENT: CPT

## 2018-11-16 PROCEDURE — 87449 NOS EACH ORGANISM AG IA: CPT | Performed by: NURSE PRACTITIONER

## 2018-11-16 PROCEDURE — 80048 BASIC METABOLIC PNL TOTAL CA: CPT | Performed by: NURSE PRACTITIONER

## 2018-11-16 PROCEDURE — 93970 EXTREMITY STUDY: CPT | Performed by: SURGERY

## 2018-11-16 PROCEDURE — 99285 EMERGENCY DEPT VISIT HI MDM: CPT

## 2018-11-16 PROCEDURE — 93970 EXTREMITY STUDY: CPT

## 2018-11-16 PROCEDURE — 92610 EVALUATE SWALLOWING FUNCTION: CPT

## 2018-11-16 PROCEDURE — 87040 BLOOD CULTURE FOR BACTERIA: CPT | Performed by: EMERGENCY MEDICINE

## 2018-11-16 PROCEDURE — 94664 DEMO&/EVAL PT USE INHALER: CPT

## 2018-11-16 PROCEDURE — 84100 ASSAY OF PHOSPHORUS: CPT | Performed by: EMERGENCY MEDICINE

## 2018-11-16 PROCEDURE — 97163 PT EVAL HIGH COMPLEX 45 MIN: CPT

## 2018-11-16 RX ORDER — PRAVASTATIN SODIUM 40 MG
40 TABLET ORAL
Status: DISCONTINUED | OUTPATIENT
Start: 2018-11-16 | End: 2018-11-16 | Stop reason: ALTCHOICE

## 2018-11-16 RX ORDER — PREDNISONE 10 MG/1
10 TABLET ORAL DAILY
Status: DISCONTINUED | OUTPATIENT
Start: 2018-11-17 | End: 2018-11-18

## 2018-11-16 RX ORDER — SODIUM CHLORIDE 450 MG/100ML
500 INJECTION, SOLUTION INTRAVENOUS CONTINUOUS
Status: DISCONTINUED | OUTPATIENT
Start: 2018-11-16 | End: 2018-11-16

## 2018-11-16 RX ORDER — AMLODIPINE BESYLATE 5 MG/1
5 TABLET ORAL 2 TIMES DAILY
Status: DISCONTINUED | OUTPATIENT
Start: 2018-11-16 | End: 2018-11-19 | Stop reason: HOSPADM

## 2018-11-16 RX ORDER — FENOFIBRATE 145 MG/1
145 TABLET, COATED ORAL DAILY
Status: DISCONTINUED | OUTPATIENT
Start: 2018-11-16 | End: 2018-11-19 | Stop reason: HOSPADM

## 2018-11-16 RX ORDER — HEPARIN SODIUM 5000 [USP'U]/ML
5000 INJECTION, SOLUTION INTRAVENOUS; SUBCUTANEOUS EVERY 8 HOURS SCHEDULED
Status: DISCONTINUED | OUTPATIENT
Start: 2018-11-16 | End: 2018-11-19 | Stop reason: HOSPADM

## 2018-11-16 RX ORDER — LISINOPRIL 20 MG/1
20 TABLET ORAL DAILY
Status: DISCONTINUED | OUTPATIENT
Start: 2018-11-16 | End: 2018-11-19 | Stop reason: HOSPADM

## 2018-11-16 RX ORDER — DEXTROSE AND SODIUM CHLORIDE 5; .45 G/100ML; G/100ML
250 INJECTION, SOLUTION INTRAVENOUS CONTINUOUS
Status: DISCONTINUED | OUTPATIENT
Start: 2018-11-16 | End: 2018-11-16

## 2018-11-16 RX ORDER — BENZONATATE 100 MG/1
200 CAPSULE ORAL 3 TIMES DAILY
Status: DISCONTINUED | OUTPATIENT
Start: 2018-11-16 | End: 2018-11-19 | Stop reason: HOSPADM

## 2018-11-16 RX ORDER — DEXTROSE MONOHYDRATE 25 G/50ML
50 INJECTION, SOLUTION INTRAVENOUS ONCE
Status: COMPLETED | OUTPATIENT
Start: 2018-11-16 | End: 2018-11-16

## 2018-11-16 RX ORDER — CHLORHEXIDINE GLUCONATE 0.12 MG/ML
15 RINSE ORAL EVERY 12 HOURS SCHEDULED
Status: DISCONTINUED | OUTPATIENT
Start: 2018-11-16 | End: 2018-11-19 | Stop reason: HOSPADM

## 2018-11-16 RX ORDER — PANTOPRAZOLE SODIUM 40 MG/1
40 TABLET, DELAYED RELEASE ORAL
Status: DISCONTINUED | OUTPATIENT
Start: 2018-11-16 | End: 2018-11-19 | Stop reason: HOSPADM

## 2018-11-16 RX ORDER — VENLAFAXINE HYDROCHLORIDE 150 MG/1
150 CAPSULE, EXTENDED RELEASE ORAL EVERY MORNING
Status: DISCONTINUED | OUTPATIENT
Start: 2018-11-16 | End: 2018-11-19 | Stop reason: HOSPADM

## 2018-11-16 RX ORDER — ALPRAZOLAM 0.5 MG/1
2 TABLET ORAL
Status: DISCONTINUED | OUTPATIENT
Start: 2018-11-16 | End: 2018-11-19 | Stop reason: HOSPADM

## 2018-11-16 RX ORDER — LEVALBUTEROL INHALATION SOLUTION 0.63 MG/3ML
0.63 SOLUTION RESPIRATORY (INHALATION) EVERY 4 HOURS PRN
Status: DISCONTINUED | OUTPATIENT
Start: 2018-11-16 | End: 2018-11-16

## 2018-11-16 RX ORDER — SODIUM CHLORIDE 450 MG/100ML
1000 INJECTION, SOLUTION INTRAVENOUS CONTINUOUS
Status: DISCONTINUED | OUTPATIENT
Start: 2018-11-16 | End: 2018-11-16

## 2018-11-16 RX ORDER — GUAIFENESIN/DEXTROMETHORPHAN 100-10MG/5
5 SYRUP ORAL 3 TIMES DAILY PRN
Status: DISCONTINUED | OUTPATIENT
Start: 2018-11-16 | End: 2018-11-17

## 2018-11-16 RX ORDER — HYDROCODONE POLISTIREX AND CHLORPHENIRAMINE POLISTIREX 10; 8 MG/5ML; MG/5ML
5 SUSPENSION, EXTENDED RELEASE ORAL EVERY 12 HOURS PRN
Status: DISPENSED | OUTPATIENT
Start: 2018-11-16 | End: 2018-11-17

## 2018-11-16 RX ORDER — INSULIN GLARGINE 100 [IU]/ML
40 INJECTION, SOLUTION SUBCUTANEOUS
Status: DISCONTINUED | OUTPATIENT
Start: 2018-11-16 | End: 2018-11-19 | Stop reason: HOSPADM

## 2018-11-16 RX ORDER — MIRTAZAPINE 15 MG/1
45 TABLET, FILM COATED ORAL
Status: DISCONTINUED | OUTPATIENT
Start: 2018-11-16 | End: 2018-11-19 | Stop reason: HOSPADM

## 2018-11-16 RX ORDER — OXYCODONE HYDROCHLORIDE 5 MG/1
5 TABLET ORAL EVERY 12 HOURS PRN
Status: DISCONTINUED | OUTPATIENT
Start: 2018-11-16 | End: 2018-11-19 | Stop reason: HOSPADM

## 2018-11-16 RX ORDER — IPRATROPIUM BROMIDE AND ALBUTEROL SULFATE 2.5; .5 MG/3ML; MG/3ML
3 SOLUTION RESPIRATORY (INHALATION) ONCE
Status: COMPLETED | OUTPATIENT
Start: 2018-11-16 | End: 2018-11-16

## 2018-11-16 RX ORDER — IPRATROPIUM BROMIDE AND ALBUTEROL SULFATE 2.5; .5 MG/3ML; MG/3ML
3 SOLUTION RESPIRATORY (INHALATION)
Status: DISCONTINUED | OUTPATIENT
Start: 2018-11-16 | End: 2018-11-19 | Stop reason: HOSPADM

## 2018-11-16 RX ORDER — ACETAMINOPHEN 325 MG/1
650 TABLET ORAL EVERY 6 HOURS PRN
Status: DISCONTINUED | OUTPATIENT
Start: 2018-11-16 | End: 2018-11-19 | Stop reason: HOSPADM

## 2018-11-16 RX ORDER — FLUTICASONE FUROATE AND VILANTEROL 200; 25 UG/1; UG/1
1 POWDER RESPIRATORY (INHALATION) DAILY
Status: DISCONTINUED | OUTPATIENT
Start: 2018-11-16 | End: 2018-11-19 | Stop reason: HOSPADM

## 2018-11-16 RX ORDER — MORPHINE SULFATE 4 MG/ML
4 INJECTION, SOLUTION INTRAMUSCULAR; INTRAVENOUS ONCE
Status: COMPLETED | OUTPATIENT
Start: 2018-11-16 | End: 2018-11-16

## 2018-11-16 RX ORDER — PREGABALIN 50 MG/1
50 CAPSULE ORAL 3 TIMES DAILY
Status: DISCONTINUED | OUTPATIENT
Start: 2018-11-16 | End: 2018-11-19 | Stop reason: HOSPADM

## 2018-11-16 RX ORDER — QUETIAPINE 200 MG/1
400 TABLET, FILM COATED, EXTENDED RELEASE ORAL
Status: DISCONTINUED | OUTPATIENT
Start: 2018-11-16 | End: 2018-11-19 | Stop reason: HOSPADM

## 2018-11-16 RX ORDER — PREDNISONE 20 MG/1
20 TABLET ORAL DAILY
Status: COMPLETED | OUTPATIENT
Start: 2018-11-16 | End: 2018-11-16

## 2018-11-16 RX ORDER — SODIUM CHLORIDE FOR INHALATION 0.9 %
3 VIAL, NEBULIZER (ML) INHALATION
Status: DISCONTINUED | OUTPATIENT
Start: 2018-11-16 | End: 2018-11-16

## 2018-11-16 RX ORDER — ATORVASTATIN CALCIUM 20 MG/1
20 TABLET, FILM COATED ORAL EVERY MORNING
Status: DISCONTINUED | OUTPATIENT
Start: 2018-11-16 | End: 2018-11-19 | Stop reason: HOSPADM

## 2018-11-16 RX ORDER — ASPIRIN 81 MG/1
81 TABLET, CHEWABLE ORAL EVERY MORNING
Status: DISCONTINUED | OUTPATIENT
Start: 2018-11-16 | End: 2018-11-19 | Stop reason: HOSPADM

## 2018-11-16 RX ORDER — DEXTROSE MONOHYDRATE 25 G/50ML
INJECTION, SOLUTION INTRAVENOUS
Status: COMPLETED
Start: 2018-11-16 | End: 2018-11-16

## 2018-11-16 RX ORDER — LEVALBUTEROL 1.25 MG/.5ML
1.25 SOLUTION, CONCENTRATE RESPIRATORY (INHALATION)
Status: DISCONTINUED | OUTPATIENT
Start: 2018-11-16 | End: 2018-11-16

## 2018-11-16 RX ORDER — DEXTROSE MONOHYDRATE 25 G/50ML
25 INJECTION, SOLUTION INTRAVENOUS ONCE
Status: DISCONTINUED | OUTPATIENT
Start: 2018-11-16 | End: 2018-11-16

## 2018-11-16 RX ORDER — LIDOCAINE 50 MG/G
1 PATCH TOPICAL DAILY
Status: DISCONTINUED | OUTPATIENT
Start: 2018-11-16 | End: 2018-11-17

## 2018-11-16 RX ORDER — SODIUM CHLORIDE 450 MG/100ML
250 INJECTION, SOLUTION INTRAVENOUS CONTINUOUS
Status: DISCONTINUED | OUTPATIENT
Start: 2018-11-16 | End: 2018-11-16

## 2018-11-16 RX ORDER — ERGOCALCIFEROL 1.25 MG/1
50000 CAPSULE ORAL 2 TIMES WEEKLY
Status: DISCONTINUED | OUTPATIENT
Start: 2018-11-19 | End: 2018-11-19 | Stop reason: HOSPADM

## 2018-11-16 RX ADMIN — AMLODIPINE BESYLATE 5 MG: 5 TABLET ORAL at 10:42

## 2018-11-16 RX ADMIN — SODIUM CHLORIDE 1000 ML: 0.9 INJECTION, SOLUTION INTRAVENOUS at 01:21

## 2018-11-16 RX ADMIN — SODIUM CHLORIDE 1000 ML/HR: 0.45 INJECTION, SOLUTION INTRAVENOUS at 02:46

## 2018-11-16 RX ADMIN — IPRATROPIUM BROMIDE AND ALBUTEROL SULFATE 3 ML: .5; 3 SOLUTION RESPIRATORY (INHALATION) at 08:10

## 2018-11-16 RX ADMIN — PREGABALIN 50 MG: 50 CAPSULE ORAL at 16:16

## 2018-11-16 RX ADMIN — SODIUM CHLORIDE 1000 ML: 0.9 INJECTION, SOLUTION INTRAVENOUS at 01:44

## 2018-11-16 RX ADMIN — MORPHINE SULFATE 4 MG: 4 INJECTION INTRAVENOUS at 01:27

## 2018-11-16 RX ADMIN — HEPARIN SODIUM 5000 UNITS: 5000 INJECTION, SOLUTION INTRAVENOUS; SUBCUTANEOUS at 14:28

## 2018-11-16 RX ADMIN — BENZONATATE 200 MG: 100 CAPSULE ORAL at 21:38

## 2018-11-16 RX ADMIN — HEPARIN SODIUM 5000 UNITS: 5000 INJECTION, SOLUTION INTRAVENOUS; SUBCUTANEOUS at 06:18

## 2018-11-16 RX ADMIN — ASPIRIN 81 MG 81 MG: 81 TABLET ORAL at 10:43

## 2018-11-16 RX ADMIN — PIPERACILLIN SODIUM,TAZOBACTAM SODIUM 3.38 G: 3; .375 INJECTION, POWDER, FOR SOLUTION INTRAVENOUS at 06:18

## 2018-11-16 RX ADMIN — OXYCODONE HYDROCHLORIDE 5 MG: 5 TABLET ORAL at 02:57

## 2018-11-16 RX ADMIN — BENZONATATE 200 MG: 100 CAPSULE ORAL at 16:16

## 2018-11-16 RX ADMIN — METOPROLOL TARTRATE 25 MG: 25 TABLET, FILM COATED ORAL at 02:57

## 2018-11-16 RX ADMIN — AMLODIPINE BESYLATE 5 MG: 5 TABLET ORAL at 18:03

## 2018-11-16 RX ADMIN — SODIUM CHLORIDE 1000 ML: 0.9 INJECTION, SOLUTION INTRAVENOUS at 00:35

## 2018-11-16 RX ADMIN — PIPERACILLIN SODIUM,TAZOBACTAM SODIUM 3.38 G: 3; .375 INJECTION, POWDER, FOR SOLUTION INTRAVENOUS at 00:49

## 2018-11-16 RX ADMIN — PREDNISONE 20 MG: 20 TABLET ORAL at 10:44

## 2018-11-16 RX ADMIN — CHLORHEXIDINE GLUCONATE 0.12% ORAL RINSE 15 ML: 1.2 LIQUID ORAL at 03:02

## 2018-11-16 RX ADMIN — DEXTROSE MONOHYDRATE 50 ML: 25 INJECTION, SOLUTION INTRAVENOUS at 07:05

## 2018-11-16 RX ADMIN — INSULIN GLARGINE 40 UNITS: 100 INJECTION, SOLUTION SUBCUTANEOUS at 21:38

## 2018-11-16 RX ADMIN — PREGABALIN 50 MG: 50 CAPSULE ORAL at 21:38

## 2018-11-16 RX ADMIN — BUSPIRONE HYDROCHLORIDE 15 MG: 10 TABLET ORAL at 18:03

## 2018-11-16 RX ADMIN — INSULIN HUMAN 8 UNITS: 100 INJECTION, SOLUTION PARENTERAL at 01:08

## 2018-11-16 RX ADMIN — CHLORHEXIDINE GLUCONATE 0.12% ORAL RINSE 15 ML: 1.2 LIQUID ORAL at 21:38

## 2018-11-16 RX ADMIN — INSULIN LISPRO 8 UNITS: 100 INJECTION, SOLUTION INTRAVENOUS; SUBCUTANEOUS at 16:15

## 2018-11-16 RX ADMIN — HYDROCODONE POLISTIREX AND CHLORPHENIRAMINE POLISTIREX 5 ML: 10; 8 SUSPENSION, EXTENDED RELEASE ORAL at 02:57

## 2018-11-16 RX ADMIN — METOPROLOL TARTRATE 25 MG: 25 TABLET, FILM COATED ORAL at 10:41

## 2018-11-16 RX ADMIN — LISINOPRIL 20 MG: 20 TABLET ORAL at 10:45

## 2018-11-16 RX ADMIN — METOPROLOL TARTRATE 25 MG: 25 TABLET, FILM COATED ORAL at 21:38

## 2018-11-16 RX ADMIN — Medication 13 UNITS/HR: at 03:03

## 2018-11-16 RX ADMIN — BENZONATATE 200 MG: 100 CAPSULE ORAL at 10:43

## 2018-11-16 RX ADMIN — INSULIN LISPRO 4 UNITS: 100 INJECTION, SOLUTION INTRAVENOUS; SUBCUTANEOUS at 12:00

## 2018-11-16 RX ADMIN — BUSPIRONE HYDROCHLORIDE 15 MG: 10 TABLET ORAL at 10:44

## 2018-11-16 RX ADMIN — QUETIAPINE FUMARATE 400 MG: 200 TABLET, EXTENDED RELEASE ORAL at 21:40

## 2018-11-16 RX ADMIN — IPRATROPIUM BROMIDE AND ALBUTEROL SULFATE 3 ML: .5; 3 SOLUTION RESPIRATORY (INHALATION) at 01:04

## 2018-11-16 RX ADMIN — INSULIN LISPRO 4 UNITS: 100 INJECTION, SOLUTION INTRAVENOUS; SUBCUTANEOUS at 16:15

## 2018-11-16 RX ADMIN — IPRATROPIUM BROMIDE AND ALBUTEROL SULFATE 3 ML: .5; 3 SOLUTION RESPIRATORY (INHALATION) at 13:58

## 2018-11-16 RX ADMIN — ALPRAZOLAM 2 MG: 0.5 TABLET ORAL at 21:38

## 2018-11-16 RX ADMIN — PREGABALIN 50 MG: 50 CAPSULE ORAL at 10:42

## 2018-11-16 RX ADMIN — PANTOPRAZOLE SODIUM 40 MG: 40 TABLET, DELAYED RELEASE ORAL at 06:18

## 2018-11-16 RX ADMIN — HEPARIN SODIUM 5000 UNITS: 5000 INJECTION, SOLUTION INTRAVENOUS; SUBCUTANEOUS at 21:39

## 2018-11-16 RX ADMIN — DEXTROSE AND SODIUM CHLORIDE 250 ML/HR: 5; .45 INJECTION, SOLUTION INTRAVENOUS at 03:02

## 2018-11-16 RX ADMIN — IPRATROPIUM BROMIDE AND ALBUTEROL SULFATE 3 ML: .5; 3 SOLUTION RESPIRATORY (INHALATION) at 19:36

## 2018-11-16 RX ADMIN — MIRTAZAPINE 45 MG: 15 TABLET, FILM COATED ORAL at 21:38

## 2018-11-16 RX ADMIN — INSULIN LISPRO 4 UNITS: 100 INJECTION, SOLUTION INTRAVENOUS; SUBCUTANEOUS at 21:39

## 2018-11-16 RX ADMIN — ATORVASTATIN CALCIUM 20 MG: 20 TABLET, FILM COATED ORAL at 10:45

## 2018-11-16 RX ADMIN — LIDOCAINE 1 PATCH: 50 PATCH TOPICAL at 02:57
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
## 2018-11-16 NOTE — ED NOTES
Pt stood on side of bed and voided without difficutly  Urine specimen obtained       Delano Carrillo RN  11/16/18 3276

## 2018-11-16 NOTE — PLAN OF CARE
Problem: Potential for Falls  Goal: Patient will remain free of falls  INTERVENTIONS:  - Assess patient frequently for physical needs  -  Identify cognitive and physical deficits and behaviors that affect risk of falls    -  [place] fall precautions as indicated by assessment   - Educate patient/family on patient safety including physical limitations  - Instruct patient to call for assistance with activity based on assessment  - Modify environment to reduce risk of injury  - Consider OT/PT consult to assist with strengthening/mobility   Outcome: Progressing      Problem: Prexisting or High Potential for Compromised Skin Integrity  Goal: Skin integrity is maintained or improved  INTERVENTIONS:  - Identify patients at risk for skin breakdown  - Assess and monitor skin integrity  - Assess and monitor nutrition and hydration status  - Monitor labs (i e  albumin)  - Assess for incontinence   - Turn and reposition patient  - Assist with mobility/ambulation  - Relieve pressure over bony prominences  - Avoid friction and shearing  - Provide appropriate hygiene as needed including keeping skin clean and dry  - Evaluate need for skin moisturizer/barrier cream  - Collaborate with interdisciplinary team (i e  Nutrition, Rehabilitation, etc )   - Patient/family teaching   Outcome: Progressing      Problem: RESPIRATORY - ADULT  Goal: Achieves optimal ventilation and oxygenation  INTERVENTIONS:  - Assess for changes in respiratory status  - Assess for changes in mentation and behavior  - Position to facilitate oxygenation and minimize respiratory effort  - Oxygen administration by appropriate delivery method based on oxygen saturation (per order) or ABGs  - Encourage broncho-pulmonary hygiene including cough, deep breathe, Incentive Spirometry  - Assess the need for suctioning and aspirate as needed  - Assess and instruct to report SOB or any respiratory difficulty  - Respiratory Therapy support as indicated    Outcome: Progressing      Problem: GASTROINTESTINAL - ADULT  Goal: Maintains or returns to baseline bowel function  INTERVENTIONS:  - Assess bowel function  - Encourage oral fluids to ensure adequate hydration  - Administer IV fluids as ordered to ensure adequate hydration  - Administer ordered medications as needed  - Encourage mobilization and activity  - Nutrition services referral to assist patient with appropriate food choices   Outcome: Progressing    Goal: Maintains adequate nutritional intake  INTERVENTIONS:  - Monitor percentage of each meal consumed  - Identify factors contributing to decreased intake, treat as appropriate  - Assist with meals as needed  - Monitor I&O, WT and lab values  - Obtain nutrition services referral as needed   Outcome: Progressing      Problem: METABOLIC, FLUID AND ELECTROLYTES - ADULT  Goal: Electrolytes maintained within normal limits  INTERVENTIONS:  - Monitor labs and assess patient for signs and symptoms of electrolyte imbalances  - Administer electrolyte replacement as ordered  - Monitor response to electrolyte replacements, including repeat lab results as appropriate  - Instruct patient on fluid and nutrition as appropriate   Outcome: Progressing    Goal: Fluid balance maintained  INTERVENTIONS:  - Monitor labs and assess for signs and symptoms of volume excess or deficit  - Monitor I/O and WT  - Instruct patient on fluid and nutrition as appropriate   Outcome: Progressing    Goal: Glucose maintained within target range  INTERVENTIONS:  - Monitor Blood Glucose as ordered  - Assess for signs and symptoms of hyperglycemia and hypoglycemia  - Administer ordered medications to maintain glucose within target range  - Assess nutritional intake and initiate nutrition service referral as needed   Outcome: Progressing

## 2018-11-16 NOTE — ED NOTES
Pt c/o sharp chest pain on coughing, noted large area of disfferent staged bruising to anterior chest along sternum  Pt states bruising is from coughing       Ria Bustamante RN  11/16/18 1843
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
## 2018-11-16 NOTE — RESPIRATORY THERAPY NOTE
Transported patient from ED to ICU 1 on Bipap on documented settings  Patient remained stable during transport
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
## 2018-11-16 NOTE — PHYSICAL THERAPY NOTE
PT EVALUATION   11/16/18 1350   Pain Assessment   Pain Assessment No/denies pain   Home Living   Type of [address] Two level;Stairs to enter with rails  (full flight stairs to enter, second floor apartment)   Home Equipment (O2 and soon to be getting rollator walker)   Additional Comments patient ambulating without assistive device prior to admission   Prior Function   Level of [place] Independent with ADLs and functional mobility   Lives With Alone  (has a roommate who does not assist)   Receives Help From Home health  (just started VNA and awaiting therapist but readmitted)   ADL Assistance Independent   IADLs Needs assistance   Comments patient requiring assist over past month due to SOB and weakness   Restrictions/Precautions   Other Precautions Droplet precautions; Fall Risk;O2  (nurse took patient off bipap for therapy)   General   Additional Pertinent History chart reviewed, patient admitted with DKA, respiratory difficulty, DM  Patient presents as deconditioned and weak with SOB on limited activity and with cough  Patient now requiring assist for transfers and gait with SOB limiting endurance   Family/Caregiver Present No   Cognition   Overall Cognitive Status WFL   Arousal/Participation Cooperative   Orientation Level Oriented X4   Following Commands Follows multistep commands with increased time or repetition   RLE Assessment   RLE Assessment (ROM WFL, strength 3+/5)   LLE Assessment   LLE Assessment (ROM WFL, strength 3+/5)   Coordination   Movements are Fluid and Coordinated 0   Transfers   Sit to Stand 4  Minimal assistance   Additional items Assist x 1;Verbal cues   Stand to Sit 4  Minimal assistance   Additional items Assist x 1;Verbal cues   Ambulation/Elevation   Gait Assistance 4  Minimal assist   Additional items Assist x 1;Verbal cues; Tactile cues   Assistive Device (hand hold)   Distance 5 feet in ICU with medical monitors and constraints limiting distance as well as SOB   Balance   Static Sitting Fair   Dynamic Sitting Fair -   Static Standing Fair -   Dynamic Standing Poor +   Ambulatory Poor +   Activity Tolerance   Activity Tolerance Patient limited by fatigue;Treatment limited secondary to medical complications (Comment)  (limited by SOB)   Nurse Made Aware yes   Assessment   Prognosis Good   Problem List Decreased strength;Decreased range of motion;Decreased endurance; Impaired balance;Decreased mobility; Decreased coordination  (SOB)   Assessment Patient seen for Physical Therapy evaluation  Patient admitted with Acute on chronic respiratory failure with hypoxia ([facility] )  Comorbidities affecting patient's physical performance include: severe COPD, hyperlipidemia, type 2 diabetes, hypertension, coronary artery disease, psychiatric disorders, chronic oxygen dependence  Of note he also had a recent admission with discharge on 11/07 secondary to his COPD exacerbation  Personal factors affecting patient at time of initial evaluation include: lives in two story house, ambulating with assistive device, stairs to enter home, inability to ambulate household distances, inability to navigate community distances, inability to navigate level surfaces without external assistance, inability to perform dynamic tasks in community, limited home support, inability to perform physical activity, inability to perform ADLS and inability to perform IADLS   Prior to admission, patient was independent with functional mobility without assistive device, independent with ADLS, requiring assist for IADLS, living in a multi-level home and ambulating household distance    Please find objective findings from Physical Therapy assessment regarding body systems outlined above with impairments and limitations including weakness, decreased ROM, impaired balance, decreased endurance, impaired coordination, gait deviations, decreased activity tolerance, decreased functional mobility tolerance, fall risk and impaired tone  The Barthel Index was used as a functional outcome tool presenting with a score of 45 today indicating marked limitations of functional mobility and ADLS  Patient's clinical presentation is currently unstable/unpredictable as seen in patient's presentation of vital sign response, changing level of pain, increased fall risk, new onset of impairment of functional mobility, decreased endurance and new onset of weakness  Pt would benefit from continued Physical Therapy treatment to address deficits as defined above and maximize level of functional mobility  As demonstrated by objective findings, the assigned level of complexity for this evaluation is high  Goals   Patient Goals breathe better, get stronger   STG Expiration Date 11/23/18   Short Term Goal #1 transfers and gait with roller walker with min assist of 1   Short Term Goal #2 gait endurance to functional household distances, strength BLes 3+/4- all to meet patient goal of improved breathing and return home   LTG Expiration Date 11/30/18   Long Term Goal #1 transfers and gait with roller walker independently   Long Term Goal #2 gait endurance to functional community distances, strength BLEs 4-/5   Treatment Day 0   Plan   Treatment/Interventions ADL retraining;Functional transfer training;LE strengthening/ROM; Elevations; Endurance training; Therapeutic exercise;Patient/family training;Equipment eval/education; Bed mobility;Gait training; Compensatory technique education   PT Frequency 5x/wk   Recommendation   Recommendation (STR)   Barthel Index   Feeding 10   Bathing 0   Grooming Score 0   Dressing Score 5   Bladder Score 5   Bowels Score 10   Toilet Use Score 5   Transfers (Bed/Chair) Score 10   Mobility (Level Surface) Score 0   Stairs Score 0   Barthel Index Score 39   Licensure   [place] License Number  Ro Craven  [id number]

## 2018-11-17 LAB
ANION GAP SERPL CALCULATED.3IONS-SCNC: 9 MMOL/L (ref 4–13)
BUN SERPL-MCNC: 19 MG/DL (ref 5–25)
CALCIUM SERPL-MCNC: 9.2 MG/DL (ref 8.3–10.1)
CHLORIDE SERPL-SCNC: 101 MMOL/L (ref 100–108)
CO2 SERPL-SCNC: 27 MMOL/L (ref 21–32)
CREAT SERPL-MCNC: 0.81 MG/DL (ref 0.6–1.3)
GFR SERPL CREATININE-BSD FRML MDRD: 97 ML/MIN/1.73SQ M
GLUCOSE SERPL-MCNC: 173 MG/DL (ref 65–140)
GLUCOSE SERPL-MCNC: 191 MG/DL (ref 65–140)
GLUCOSE SERPL-MCNC: 209 MG/DL (ref 65–140)
GLUCOSE SERPL-MCNC: 247 MG/DL (ref 65–140)
GLUCOSE SERPL-MCNC: 285 MG/DL (ref 65–140)
MAGNESIUM SERPL-MCNC: 1.9 MG/DL (ref 1.6–2.6)
MRSA NOSE QL CULT: NORMAL
POTASSIUM SERPL-SCNC: 3.7 MMOL/L (ref 3.5–5.3)
SODIUM SERPL-SCNC: 137 MMOL/L (ref 136–145)

## 2018-11-17 PROCEDURE — 80048 BASIC METABOLIC PNL TOTAL CA: CPT | Performed by: NURSE PRACTITIONER

## 2018-11-17 PROCEDURE — 94668 MNPJ CHEST WALL SBSQ: CPT

## 2018-11-17 PROCEDURE — G8988 SELF CARE GOAL STATUS: HCPCS

## 2018-11-17 PROCEDURE — 99233 SBSQ HOSP IP/OBS HIGH 50: CPT | Performed by: INTERNAL MEDICINE

## 2018-11-17 PROCEDURE — 94664 DEMO&/EVAL PT USE INHALER: CPT

## 2018-11-17 PROCEDURE — 94760 N-INVAS EAR/PLS OXIMETRY 1: CPT

## 2018-11-17 PROCEDURE — 82948 REAGENT STRIP/BLOOD GLUCOSE: CPT

## 2018-11-17 PROCEDURE — 97166 OT EVAL MOD COMPLEX 45 MIN: CPT

## 2018-11-17 PROCEDURE — 83735 ASSAY OF MAGNESIUM: CPT | Performed by: NURSE PRACTITIONER

## 2018-11-17 PROCEDURE — 94640 AIRWAY INHALATION TREATMENT: CPT

## 2018-11-17 PROCEDURE — G8987 SELF CARE CURRENT STATUS: HCPCS

## 2018-11-17 PROCEDURE — 94660 CPAP INITIATION&MGMT: CPT

## 2018-11-17 RX ORDER — POTASSIUM CHLORIDE 20 MEQ/1
40 TABLET, EXTENDED RELEASE ORAL ONCE
Status: COMPLETED | OUTPATIENT
Start: 2018-11-17 | End: 2018-11-17

## 2018-11-17 RX ORDER — MAGNESIUM SULFATE 1 G/100ML
1 INJECTION INTRAVENOUS ONCE
Status: COMPLETED | OUTPATIENT
Start: 2018-11-17 | End: 2018-11-17

## 2018-11-17 RX ORDER — HYDROCODONE POLISTIREX AND CHLORPHENIRAMINE POLISTIREX 10; 8 MG/5ML; MG/5ML
5 SUSPENSION, EXTENDED RELEASE ORAL EVERY 12 HOURS PRN
Status: DISCONTINUED | OUTPATIENT
Start: 2018-11-17 | End: 2018-11-19 | Stop reason: HOSPADM

## 2018-11-17 RX ORDER — LIDOCAINE 50 MG/G
1 PATCH TOPICAL DAILY
Status: DISCONTINUED | OUTPATIENT
Start: 2018-11-17 | End: 2018-11-19 | Stop reason: HOSPADM

## 2018-11-17 RX ADMIN — HEPARIN SODIUM 5000 UNITS: 5000 INJECTION, SOLUTION INTRAVENOUS; SUBCUTANEOUS at 21:25

## 2018-11-17 RX ADMIN — QUETIAPINE FUMARATE 400 MG: 200 TABLET, EXTENDED RELEASE ORAL at 21:35

## 2018-11-17 RX ADMIN — METOPROLOL TARTRATE 25 MG: 25 TABLET, FILM COATED ORAL at 08:04

## 2018-11-17 RX ADMIN — IPRATROPIUM BROMIDE AND ALBUTEROL SULFATE 3 ML: .5; 3 SOLUTION RESPIRATORY (INHALATION) at 19:11

## 2018-11-17 RX ADMIN — PREGABALIN 50 MG: 50 CAPSULE ORAL at 21:26

## 2018-11-17 RX ADMIN — ASPIRIN 81 MG 81 MG: 81 TABLET ORAL at 08:04

## 2018-11-17 RX ADMIN — LISINOPRIL 20 MG: 20 TABLET ORAL at 08:04

## 2018-11-17 RX ADMIN — PREGABALIN 50 MG: 50 CAPSULE ORAL at 16:21

## 2018-11-17 RX ADMIN — FLUTICASONE FUROATE AND VILANTEROL TRIFENATATE 1 PUFF: 200; 25 POWDER RESPIRATORY (INHALATION) at 08:05

## 2018-11-17 RX ADMIN — INSULIN LISPRO 4 UNITS: 100 INJECTION, SOLUTION INTRAVENOUS; SUBCUTANEOUS at 21:35

## 2018-11-17 RX ADMIN — AMLODIPINE BESYLATE 5 MG: 5 TABLET ORAL at 17:55

## 2018-11-17 RX ADMIN — BENZONATATE 200 MG: 100 CAPSULE ORAL at 21:25

## 2018-11-17 RX ADMIN — VENLAFAXINE HYDROCHLORIDE 150 MG: 150 CAPSULE, EXTENDED RELEASE ORAL at 08:05

## 2018-11-17 RX ADMIN — OXYCODONE HYDROCHLORIDE 5 MG: 5 TABLET ORAL at 13:46

## 2018-11-17 RX ADMIN — HEPARIN SODIUM 5000 UNITS: 5000 INJECTION, SOLUTION INTRAVENOUS; SUBCUTANEOUS at 05:03

## 2018-11-17 RX ADMIN — MIRTAZAPINE 45 MG: 15 TABLET, FILM COATED ORAL at 21:26

## 2018-11-17 RX ADMIN — BUSPIRONE HYDROCHLORIDE 15 MG: 10 TABLET ORAL at 17:55

## 2018-11-17 RX ADMIN — BENZONATATE 200 MG: 100 CAPSULE ORAL at 16:22

## 2018-11-17 RX ADMIN — CHLORHEXIDINE GLUCONATE 0.12% ORAL RINSE 15 ML: 1.2 LIQUID ORAL at 21:24

## 2018-11-17 RX ADMIN — VANCOMYCIN HYDROCHLORIDE 1500 MG: 10 INJECTION, POWDER, LYOPHILIZED, FOR SOLUTION INTRAVENOUS at 21:30

## 2018-11-17 RX ADMIN — INSULIN LISPRO 4 UNITS: 100 INJECTION, SOLUTION INTRAVENOUS; SUBCUTANEOUS at 16:24

## 2018-11-17 RX ADMIN — IPRATROPIUM BROMIDE AND ALBUTEROL SULFATE 3 ML: .5; 3 SOLUTION RESPIRATORY (INHALATION) at 14:04

## 2018-11-17 RX ADMIN — PANTOPRAZOLE SODIUM 40 MG: 40 TABLET, DELAYED RELEASE ORAL at 05:02

## 2018-11-17 RX ADMIN — POTASSIUM CHLORIDE 40 MEQ: 1500 TABLET, EXTENDED RELEASE ORAL at 07:54

## 2018-11-17 RX ADMIN — METOPROLOL TARTRATE 25 MG: 25 TABLET, FILM COATED ORAL at 21:25

## 2018-11-17 RX ADMIN — OXYCODONE HYDROCHLORIDE 5 MG: 5 TABLET ORAL at 00:33

## 2018-11-17 RX ADMIN — AMLODIPINE BESYLATE 5 MG: 5 TABLET ORAL at 08:04

## 2018-11-17 RX ADMIN — LIDOCAINE 1 PATCH: 50 PATCH CUTANEOUS at 05:02

## 2018-11-17 RX ADMIN — BUSPIRONE HYDROCHLORIDE 15 MG: 10 TABLET ORAL at 08:04

## 2018-11-17 RX ADMIN — INSULIN LISPRO 2 UNITS: 100 INJECTION, SOLUTION INTRAVENOUS; SUBCUTANEOUS at 07:54

## 2018-11-17 RX ADMIN — BENZONATATE 200 MG: 100 CAPSULE ORAL at 08:04

## 2018-11-17 RX ADMIN — ATORVASTATIN CALCIUM 20 MG: 20 TABLET, FILM COATED ORAL at 08:04

## 2018-11-17 RX ADMIN — MAGNESIUM SULFATE HEPTAHYDRATE 1 G: 1 INJECTION, SOLUTION INTRAVENOUS at 07:53

## 2018-11-17 RX ADMIN — ALPRAZOLAM 2 MG: 0.5 TABLET ORAL at 21:40

## 2018-11-17 RX ADMIN — INSULIN GLARGINE 40 UNITS: 100 INJECTION, SOLUTION SUBCUTANEOUS at 21:27

## 2018-11-17 RX ADMIN — ACETAMINOPHEN 650 MG: 325 TABLET, FILM COATED ORAL at 07:53

## 2018-11-17 RX ADMIN — HYDROCODONE POLISTIREX AND CHLORPHENIRAMINE POLISTIREX 5 ML: 10; 8 SUSPENSION, EXTENDED RELEASE ORAL at 20:06

## 2018-11-17 RX ADMIN — CHLORHEXIDINE GLUCONATE 0.12% ORAL RINSE 15 ML: 1.2 LIQUID ORAL at 08:04

## 2018-11-17 RX ADMIN — IPRATROPIUM BROMIDE AND ALBUTEROL SULFATE 3 ML: .5; 3 SOLUTION RESPIRATORY (INHALATION) at 02:27

## 2018-11-17 RX ADMIN — INSULIN LISPRO 6 UNITS: 100 INJECTION, SOLUTION INTRAVENOUS; SUBCUTANEOUS at 11:40

## 2018-11-17 RX ADMIN — PREGABALIN 50 MG: 50 CAPSULE ORAL at 08:04

## 2018-11-17 RX ADMIN — PREDNISONE 10 MG: 10 TABLET ORAL at 08:07

## 2018-11-17 RX ADMIN — FENOFIBRATE 145 MG: 145 TABLET ORAL at 08:05

## 2018-11-17 RX ADMIN — IPRATROPIUM BROMIDE AND ALBUTEROL SULFATE 3 ML: .5; 3 SOLUTION RESPIRATORY (INHALATION) at 07:49

## 2018-11-17 RX ADMIN — HEPARIN SODIUM 5000 UNITS: 5000 INJECTION, SOLUTION INTRAVENOUS; SUBCUTANEOUS at 13:47

## 2018-11-17 NOTE — SPEECH THERAPY NOTE
Upon arrival patient had just finished lunch and did not want to have any more po  He reports some difficulty with swallowing when he eats too fast  Will plan to follow up again within 24-48 hours      HIREN Juarez , [address] [facility]  Speech Language Pathologist   [id number]

## 2018-11-17 NOTE — PROGRESS NOTES
Progress Note/Transfer - Critical Care   Micehlle You 60 y o  male MRN: [id number]  Unit/Bed#: ICU 01 Encounter: [id number]    Assessment/Plan:    1  Worsening shortness of breath likely secondary to cough and deconditioning with associated atelectasis  Continue with BiPAP at hs and prn  Can go back to his CPAP upon discharge  Continue with supportive care for cough  Unable to get sputum culture however procalcitonin Is negative and doubt new infection  Continue with Prednisone taper as ordered  Continue with Breo    2  Hyperglycemia on admission and patient was briefly on insulin drip  No evidence of DKA  He is currently transitioned to insulin SQ  Patient was on Farxiga, metformin and Victoza at home  This has not been restarted  3  HTN- patient is on home meds  Norvasc, metoprolol and lisinopril restarted  Blood pressures stable    4  CAD- c/w ASA    5  Hyperlipidemia- continue with home meds  6  Bipolar disorder- he is on many antipsychotics- all except Vraylar have been restarted    7  Dysphagia eval- mild risk for aspiration; strict aspiration precautions    8  Severe deconditioning  PT eval obtained and recommendations appreciated  I did discuss with case management and he will referred for STR  Discussed with patient as well and he is agreeable to this  HPI/24hr events: Pt seen and examined  Chart reviewed  No overnight events  Patient is just off of BiPAP  Cough is improved  Shortness of breath is improved  Insulin drip is discontinued and patient is on Lantus       Tele Events: None Noted    Vitals:   Temp:  [97 °F (36 1 °C)-98 9 °F (37 2 °C)] 97 7 °F (36 5 °C)  HR:  [] 69  Resp:  [18-58] 20  BP: (117-183)/(61-95) 167/73  FiO2 (%):  [35-38] 35  Weight (last 2 days)     Date/Time   Weight    11/17/18 0515  133 (293 43)    11/16/18 0602  (!)  137 (302 69)    11/16/18 0223  133 (293 21)            SpO2: SpO2: 98 %, SpO2 Device: O2 Device: Nasal cannula    Hemodynamics:  N/A    Invasive/non-invasive ventilation settings:  Respiratory    Lab Data (Last 4 hours)    None         O2/Vent Data (Last 4 hours)      11/17 0336          Non-Invasive Ventilation Mode BiPAP                   Results from last 7 days  Lab Units 11/16/18  0103   PH ART  7 383   PCO2 ART mm Hg 28 2*   PO2 ART mm Hg 73 2*   HCO3 ART mmol/L 16 4*   ABG SOURCE  Radial, Left      Nutrition:        Diet Orders            Start     Ordered    11/16/18 1057  Room Service  Once     Question:  Type of Service  Answer:  Room Service-Appropriate    11/16/18 1056    11/16/18 0737  Diet Campos/CHO Controlled; Consistent Carbohydrate Diet Level 2 (5 carb servings/75 grams CHO/meal)  Diet effective now     Question Answer Comment   Diet Type Campos/CHO Controlled    Campos/CHO Controlled Consistent Carbohydrate Diet Level 2 (5 carb servings/75 grams CHO/meal)    RD to adjust diet per protocol? Yes        11/16/18 0738        Ins/Outs:   I/O       11/15 0701 - 11/16 0700 11/16 0701 - 11/17 0700    P  O   1035    I V  (mL/kg) 1056 7 (7 7)     IV Piggyback 3050     Total Intake(mL/kg) 4106 7 (30) 1035 (7 8)    Urine (mL/kg/hr) 1500 6830 (2 1)    Total Output 1500 6830    Net +2606 7 -5795                Renal Replacement: No    Lines/Drains:  Invasive Devices     Peripheral Intravenous Line            Peripheral IV 11/16/18 Left Forearm 1 day                 Active medications:  Scheduled Meds:  Current Facility-Administered Medications:  acetaminophen 650 mg Oral Q6H PRN [place] Angelique, CRNP   ALPRAZolam 2 mg Oral HS PRN [place] Angelique, CRNP   amLODIPine 5 mg Oral BID [place] Angelique, CRNP   aspirin 81 mg Oral QAM [place] Angelique, CRNP   atorvastatin 20 mg Oral QAM [place] Angelique, CRNP   benzonatate 200 mg Oral TID [place] Angelique, CRNP   busPIRone 15 mg Oral BID [place] Angelique, CRNP   chlorhexidine 15 mL Swish & Spit Q12H [facility] MANAS Foreman   dextromethorphan-guaiFENesin 5 mL Oral TID PRN Epimenrobinson Braun MANAS Hester   [START ON 11/19/2018] ergocalciferol 50,000 Units Oral Once per day on Mon Thu MANAS Jernigan   fenofibrate 145 mg Oral Daily MANAS James   fluticasone-vilanterol 1 puff Inhalation Daily MANAS James   heparin (porcine) 5,000 Units Subcutaneous Q8H 1024 S MANAS Hernandez   insulin glargine 40 Units Subcutaneous HS Eduarda Jean Baptiste PA-C   insulin lispro 2-12 Units Subcutaneous 4x Daily (AC & HS) Eduarda Jean Baptiste PA-C   ipratropium-albuterol 3 mL Nebulization Q6H Eduarda Jean Baptiste PA-C   lidocaine 1 patch Topical Daily Ginger Cabral MD   lisinopril 20 mg Oral Daily MANAS James   magnesium sulfate 1 g Intravenous Once MANAS James   metoprolol tartrate 25 mg Oral Q12H [facility]   mirtazapine 45 mg Oral HS TristanMANAS Caraballo   oxyCODONE 5 mg Oral Q12H PRN MANAS James   pantoprazole 40 mg Oral Early Morning MANAS James   phenol 1 spray Mouth/Throat Q2H PRN Eduarda Jean Baptiste PA-C   potassium chloride 40 mEq Oral Once MANAS James   predniSONE 10 mg Oral Daily TristanMANAS Caraballo   pregabalin 50 mg Oral TID TristanMANAS Caraballo   QUEtiapine 400 mg Oral HS TristanMANAS Caraballo   venlafaxine 150 mg Oral QAM MANAS Huff     PRN Meds:  acetaminophen 650 mg Q6H PRN   ALPRAZolam 2 mg HS PRN   dextromethorphan-guaiFENesin 5 mL TID PRN   oxyCODONE 5 mg Q12H PRN   phenol 1 spray Q2H PRN     ____________________________________________________________________    Physical Exam   Constitutional: He is oriented to person, place, and time  He appears well-developed and well-nourished  No distress  HENT:   Head: Normocephalic and atraumatic  Mouth/Throat: No oropharyngeal exudate  Eyes: Pupils are equal, round, and reactive to light  EOM are normal    Neck: Normal range of motion  Cardiovascular:   Murmur heard  Pulmonary/Chest: Effort normal  He has rales  Abdominal: Soft   Bowel sounds are normal  He exhibits no distension  There is no tenderness  Musculoskeletal: Normal range of motion  He exhibits no edema  Neurological: He is alert and oriented to person, place, and time  Skin: Skin is warm and dry  He is not diaphoretic  Psychiatric: He has a normal mood and affect  His behavior is normal    Nursing note and vitals reviewed  ____________________________________________________________________    Labs:   CBC:   Results from last 7 days  Lab Units 11/16/18  0436 11/16/18  0024   WBC Thousand/uL 14 20* 17 15*   HEMOGLOBIN g/dL 12 6 13 7   HEMATOCRIT % 39 8 43 1   MCV fL 97 97   PLATELETS Thousands/uL 210 230     CMP:   Results from last 7 days  Lab Units 11/17/18  0513 11/16/18  0917 11/16/18  0436 11/16/18  0024   POTASSIUM mmol/L 3 7 3 9 3 8 4 6   CHLORIDE mmol/L 101 102 103 95*   CO2 mmol/L 27 25 20* 17*   BUN mg/dL 19 21 23 30*   CREATININE mg/dL 0 81 0 98 1 02 1 25   CALCIUM mg/dL 9 2 8 7 8 3 8 8   AST U/L  --   --  5 12   ALT U/L  --   --  23 28   ALK PHOS U/L  --   --  73 84   EGFR ml/min/1 73sq m 97 84 80 63     Magnesium:   Results from last 7 days  Lab Units 11/17/18  0513   MAGNESIUM mg/dL 1 9     Phosphorous:   Results from last 7 days  Lab Units 11/16/18  0436   PHOSPHORUS mg/dL 3 1     Troponin:   Results from last 7 days  Lab Units 11/16/18  0917 11/16/18  0438   TROPONIN I ng/mL <0 02 <0 02     PT/INR:   Results from last 7 days  Lab Units 11/16/18  0024   PTT seconds 25   INR  0 91     Lactic Acid:   Results from last 7 days  Lab Units 11/16/18  0024   LACTIC ACID mmol/L 1 8     BNP:   Results from last 7 days  Lab Units 11/16/18  0024   NT-PRO BNP pg/mL 82     Imaging: no new imaging      Micro: Lab Results   Component Value Date    BLOODCX No Growth After 5 Days  10/31/2018    BLOODCX No Growth After 5 Days  10/31/2018    BLOODCX No Growth After 5 Days   09/06/2017    URINECX 0575-6585 cfu/ml Mixed Contaminants X2 03/20/2017    URINECX No Growth <1000 cfu/mL 11/27/2016 URINECX No Growth <1000 cfu/mL 09/02/2016    SPUTUMCULTUR Test not performed  Suggest repeat specimen   11/08/2017    SPUTUMCULTUR 1+ Growth of Staphylococcus aureus 03/20/2017    SPUTUMCULTUR 1+ Growth of Mixed Respiratory Francine 03/20/2017    MRSACULTURE  10/31/2018     No Methicillin Resistant Staphlyococcus aureus (MRSA) isolated        ____________________________________________________________________        Code Status: Level 1 - Full Code

## 2018-11-17 NOTE — PROGRESS NOTES
Patient transferred in stable condition by wheelchair to [facility] accompanied  By MARY ANN Hamilton received patient at bedside

## 2018-11-17 NOTE — OCCUPATIONAL THERAPY NOTE
OT EVALUATION   11/17/18 1344   Note Type   Note type Eval only   Restrictions/Precautions   Weight Bearing Precautions Per Order No   Pain Assessment   Pain Assessment 0-10   Pain Score 6   Pain Location Chest   Pain Orientation Right   Pain Onset Other (Comment)  (After increase coughing)   Home Living   Type of Home Apartment   Home Layout One level   Bathroom Shower/Tub Tub/shower unit   Bathroom Toilet Standard   Additional Comments Patient requires approximately 10 steps to negotiate into apartment      Prior Function   Level of [place] Independent with ADLs and functional mobility   Lives With Other (Comment)  (roommate that does not assist him)   Receives Help From Friend(s)  ((Help with cooking and cleaning))   ADL Assistance Independent   IADLs Needs assistance   Lifestyle   Intrinsic Gratification Writing self help books   ADL   Eating Assistance 7  Independent   Grooming Assistance 5  Supervision/Setup   UB Bathing Assistance 7  Independent   [address] 4  Minimal Assistance   [address] 5  Supervision/Setup    [place] [address]  4  Minimal Assistance   Bed Mobility   Sit to Supine 4  Minimal assistance   Transfers   Sit to Stand 4  Minimal assistance   Stand to Sit 4  Minimal assistance   Functional Mobility   Functional Mobility 4  Minimal assistance   Additional Comments 5 feet   Additional items Rolling walker   Balance   Static Sitting Good   Dynamic Sitting Fair +   Static Standing Fair   Dynamic Standing Fair -   Activity Tolerance   Activity Tolerance Patient limited by fatigue   RUE Assessment   RUE Assessment (Limited AROM on shoulder flexion and abduction due to pain )   LUE Assessment   LUE Assessment WFL   Hand Function   Gross Motor Coordination Functional   Fine Motor Coordination Functional   Cognition   Overall Cognitive Status WFL   Orientation Level Oriented X4   Assessment   Limitation Decreased ADL status; Decreased UE ROM; Decreased UE strength;Decreased endurance   Prognosis Fair   Assessment Patient evaluated by Occupational Therapy  Patient admitted with Acute on chronic respiratory failure with hypoxia ([facility] [address] )  The patients occupational profile, medical and therapy history includes a expanded review of medical and/or therapy records and additional review of physical, cognitive, or psychosocial history related to current functional performance  Comorbidities affecting functional mobility and ADLS include: COPD, diabetes, hypertension, neuropathy and obesity  Prior to admission, patient was independent with functional mobility without assistive device, independent with ADLS, requiring assist for IADLS and living with roomate in a 1 level home with 10 steps to enter  The evaluation identifies the following performance deficits: weakness, decreased ROM, impaired balance, decreased endurance, increased fall risk, decreased ADLS, decreased IADLS and decreased activity tolerance, that result in activity limitations and/or participation restrictions  This evaluation requires clinical decision making of moderate complexity, because the patient may present with comorbidities that affect occupational performance and required minimal or moderate modification of tasks or assistance with the consideration of several treatment options  The Barthel Index was used as a functional outcome tool presenting with a score of 45, indicating marked limitations of functional mobility and ADLS  Patient will benefit from skilled Occupational Therapy services to address above deficits and facilitate a safe return to prior level of function   Goals   Patient Goals To get strong again   STG Time Frame (1-7)   Short Term Goal #1 Goals established to promote patient goal of getting stronger  Patient will increase standing tolerance to 5 minutes during ADL task to decrease assistance level and decrease fall risk   Patient will increase functional mobility to and from bathroom with rolling walker with supervision to increase performance with ADLS and to use a toilet; Patient will tolerate 5 minutes of UE ROM/strengthening to increase general activity tolerance and performance in ADLS/IADLS; Patient will improve functional activity tolerance to 5 minutes of sustained functional tasks to increase participation in basic self-care and decrease assistance level  Patient will increase dynamic standing balance to supervision to improve postural stability and decrease fall risk during standing ADLS and transfers  LTG Time Frame (8-14)   Long Term Goal #1 Goals established to promote patient goal of getting stronger  Patient will increase standing tolerance to 8 minutes during ADL task to decrease assistance level and decrease fall risk  Patient will increase functional mobility to and from bathroom with rolling walker with [place] to increase performance with ADLS and to use a toilet; Patient will tolerate 8 minutes of UE ROM/strengthening to increase general activity tolerance and performance in ADLS/IADLS; Patient will improve functional activity tolerance to 8 minutes of sustained functional tasks to increase participation in basic self-care and decrease assistance level  Patient will increase dynamic standing balance to independence to improve postural stability and decrease fall risk during standing ADLS and transfers  Functional Transfer Goals   Pt Will Perform All Functional Transfers Independently   ADL Goals   Pt Will Perform Eating Independently   Pt Will Perform Grooming Independently   Pt Will Perform Bathing (STG Sup LTG Independent)   Pt Will Perform UE Dressing Independently   Pt Will Perform LE Dressing (STG Supervision LTG Independent)   Pt Will Perform Toileting (STG Supervision LTG Independent)   Plan   Treatment Interventions ADL retraining;Functional transfer training;UE strengthening/ROM; Endurance training   Goal Expiration Date 12/01/18   Treatment Day (0)   OT Frequency 3-5x/wk   Recommendation   OT Discharge Recommendation (STR vs Home health agency)   Barthel Index   Feeding 10   Bathing 0   Grooming Score 0   Dressing Score 5   Bladder Score 5   Bowels Score 10   Toilet Use Score 5   Transfers (Bed/Chair) Score 10   Mobility (Level Surface) Score 0   Stairs Score 0   Barthel Index Score 45   Licensure   [place] License Number  Lajean Josiah MS OTR/L [id number]

## 2018-11-18 PROBLEM — R06.02 SHORTNESS OF BREATH: Status: ACTIVE | Noted: 2018-04-04

## 2018-11-18 PROBLEM — R78.81 POSITIVE BLOOD CULTURE: Status: ACTIVE | Noted: 2018-11-18

## 2018-11-18 LAB
ANION GAP SERPL CALCULATED.3IONS-SCNC: 12 MMOL/L (ref 4–13)
BASOPHILS # BLD AUTO: 0.03 THOUSANDS/ΜL (ref 0–0.1)
BASOPHILS NFR BLD AUTO: 0 % (ref 0–1)
BUN SERPL-MCNC: 19 MG/DL (ref 5–25)
CALCIUM SERPL-MCNC: 8.9 MG/DL (ref 8.3–10.1)
CHLORIDE SERPL-SCNC: 99 MMOL/L (ref 100–108)
CO2 SERPL-SCNC: 25 MMOL/L (ref 21–32)
CREAT SERPL-MCNC: 0.8 MG/DL (ref 0.6–1.3)
EOSINOPHIL # BLD AUTO: 0.08 THOUSAND/ΜL (ref 0–0.61)
EOSINOPHIL NFR BLD AUTO: 1 % (ref 0–6)
ERYTHROCYTE [DISTWIDTH] IN BLOOD BY AUTOMATED COUNT: 12.6 % (ref 11.6–15.1)
GFR SERPL CREATININE-BSD FRML MDRD: 98 ML/MIN/1.73SQ M
GLUCOSE SERPL-MCNC: 165 MG/DL (ref 65–140)
GLUCOSE SERPL-MCNC: 195 MG/DL (ref 65–140)
GLUCOSE SERPL-MCNC: 196 MG/DL (ref 65–140)
GLUCOSE SERPL-MCNC: 243 MG/DL (ref 65–140)
GLUCOSE SERPL-MCNC: 256 MG/DL (ref 65–140)
HCT VFR BLD AUTO: 42.1 % (ref 36.5–49.3)
HGB BLD-MCNC: 13.5 G/DL (ref 12–17)
IMM GRANULOCYTES # BLD AUTO: 0.13 THOUSAND/UL (ref 0–0.2)
IMM GRANULOCYTES NFR BLD AUTO: 1 % (ref 0–2)
LYMPHOCYTES # BLD AUTO: 3.47 THOUSANDS/ΜL (ref 0.6–4.47)
LYMPHOCYTES NFR BLD AUTO: 38 % (ref 14–44)
MAGNESIUM SERPL-MCNC: 1.9 MG/DL (ref 1.6–2.6)
MCH RBC QN AUTO: 30.4 PG (ref 26.8–34.3)
MCHC RBC AUTO-ENTMCNC: 32.1 G/DL (ref 31.4–37.4)
MCV RBC AUTO: 95 FL (ref 82–98)
MONOCYTES # BLD AUTO: 0.64 THOUSAND/ΜL (ref 0.17–1.22)
MONOCYTES NFR BLD AUTO: 7 % (ref 4–12)
NEUTROPHILS # BLD AUTO: 4.73 THOUSANDS/ΜL (ref 1.85–7.62)
NEUTS SEG NFR BLD AUTO: 53 % (ref 43–75)
NRBC BLD AUTO-RTO: 0 /100 WBCS
PHOSPHATE SERPL-MCNC: 4 MG/DL (ref 2.7–4.5)
PLATELET # BLD AUTO: 198 THOUSANDS/UL (ref 149–390)
PMV BLD AUTO: 10 FL (ref 8.9–12.7)
POTASSIUM SERPL-SCNC: 3.6 MMOL/L (ref 3.5–5.3)
RBC # BLD AUTO: 4.44 MILLION/UL (ref 3.88–5.62)
SODIUM SERPL-SCNC: 136 MMOL/L (ref 136–145)
WBC # BLD AUTO: 9.08 THOUSAND/UL (ref 4.31–10.16)

## 2018-11-18 PROCEDURE — 84100 ASSAY OF PHOSPHORUS: CPT | Performed by: NURSE PRACTITIONER

## 2018-11-18 PROCEDURE — 94760 N-INVAS EAR/PLS OXIMETRY 1: CPT

## 2018-11-18 PROCEDURE — 99232 SBSQ HOSP IP/OBS MODERATE 35: CPT | Performed by: INTERNAL MEDICINE

## 2018-11-18 PROCEDURE — 80048 BASIC METABOLIC PNL TOTAL CA: CPT | Performed by: NURSE PRACTITIONER

## 2018-11-18 PROCEDURE — 94640 AIRWAY INHALATION TREATMENT: CPT

## 2018-11-18 PROCEDURE — 85025 COMPLETE CBC W/AUTO DIFF WBC: CPT | Performed by: NURSE PRACTITIONER

## 2018-11-18 PROCEDURE — 82948 REAGENT STRIP/BLOOD GLUCOSE: CPT

## 2018-11-18 PROCEDURE — 94660 CPAP INITIATION&MGMT: CPT

## 2018-11-18 PROCEDURE — 83735 ASSAY OF MAGNESIUM: CPT | Performed by: NURSE PRACTITIONER

## 2018-11-18 RX ORDER — PREDNISONE 1 MG/1
5 TABLET ORAL DAILY
Status: DISCONTINUED | OUTPATIENT
Start: 2018-11-21 | End: 2018-11-19 | Stop reason: HOSPADM

## 2018-11-18 RX ORDER — PREDNISONE 10 MG/1
10 TABLET ORAL DAILY
Status: DISCONTINUED | OUTPATIENT
Start: 2018-11-18 | End: 2018-11-19 | Stop reason: HOSPADM

## 2018-11-18 RX ADMIN — CHLORHEXIDINE GLUCONATE 0.12% ORAL RINSE 15 ML: 1.2 LIQUID ORAL at 21:40

## 2018-11-18 RX ADMIN — ASPIRIN 81 MG 81 MG: 81 TABLET ORAL at 08:20

## 2018-11-18 RX ADMIN — METOPROLOL TARTRATE 25 MG: 25 TABLET, FILM COATED ORAL at 21:38

## 2018-11-18 RX ADMIN — FENOFIBRATE 145 MG: 145 TABLET ORAL at 08:20

## 2018-11-18 RX ADMIN — BENZONATATE 200 MG: 100 CAPSULE ORAL at 08:20

## 2018-11-18 RX ADMIN — INSULIN LISPRO 2 UNITS: 100 INJECTION, SOLUTION INTRAVENOUS; SUBCUTANEOUS at 08:21

## 2018-11-18 RX ADMIN — AMLODIPINE BESYLATE 5 MG: 5 TABLET ORAL at 08:20

## 2018-11-18 RX ADMIN — HEPARIN SODIUM 5000 UNITS: 5000 INJECTION, SOLUTION INTRAVENOUS; SUBCUTANEOUS at 05:24

## 2018-11-18 RX ADMIN — IPRATROPIUM BROMIDE AND ALBUTEROL SULFATE 3 ML: .5; 3 SOLUTION RESPIRATORY (INHALATION) at 07:30

## 2018-11-18 RX ADMIN — CHLORHEXIDINE GLUCONATE 0.12% ORAL RINSE 15 ML: 1.2 LIQUID ORAL at 08:19

## 2018-11-18 RX ADMIN — INSULIN LISPRO 6 UNITS: 100 INJECTION, SOLUTION INTRAVENOUS; SUBCUTANEOUS at 17:09

## 2018-11-18 RX ADMIN — BENZONATATE 200 MG: 100 CAPSULE ORAL at 15:35

## 2018-11-18 RX ADMIN — PREGABALIN 50 MG: 50 CAPSULE ORAL at 08:31

## 2018-11-18 RX ADMIN — BENZONATATE 200 MG: 100 CAPSULE ORAL at 21:30

## 2018-11-18 RX ADMIN — FLUTICASONE FUROATE AND VILANTEROL TRIFENATATE 1 PUFF: 200; 25 POWDER RESPIRATORY (INHALATION) at 08:21

## 2018-11-18 RX ADMIN — LIDOCAINE 1 PATCH: 50 PATCH CUTANEOUS at 05:26

## 2018-11-18 RX ADMIN — LISINOPRIL 20 MG: 20 TABLET ORAL at 08:20

## 2018-11-18 RX ADMIN — INSULIN GLARGINE 40 UNITS: 100 INJECTION, SOLUTION SUBCUTANEOUS at 21:39

## 2018-11-18 RX ADMIN — INSULIN LISPRO 4 UNITS: 100 INJECTION, SOLUTION INTRAVENOUS; SUBCUTANEOUS at 11:57

## 2018-11-18 RX ADMIN — AMLODIPINE BESYLATE 5 MG: 5 TABLET ORAL at 17:10

## 2018-11-18 RX ADMIN — PREGABALIN 50 MG: 50 CAPSULE ORAL at 21:32

## 2018-11-18 RX ADMIN — HEPARIN SODIUM 5000 UNITS: 5000 INJECTION, SOLUTION INTRAVENOUS; SUBCUTANEOUS at 13:44

## 2018-11-18 RX ADMIN — PREGABALIN 50 MG: 50 CAPSULE ORAL at 15:35

## 2018-11-18 RX ADMIN — BUSPIRONE HYDROCHLORIDE 15 MG: 10 TABLET ORAL at 08:31

## 2018-11-18 RX ADMIN — MIRTAZAPINE 45 MG: 15 TABLET, FILM COATED ORAL at 21:31

## 2018-11-18 RX ADMIN — IPRATROPIUM BROMIDE AND ALBUTEROL SULFATE 3 ML: .5; 3 SOLUTION RESPIRATORY (INHALATION) at 02:40

## 2018-11-18 RX ADMIN — QUETIAPINE FUMARATE 400 MG: 200 TABLET, EXTENDED RELEASE ORAL at 21:31

## 2018-11-18 RX ADMIN — ATORVASTATIN CALCIUM 20 MG: 20 TABLET, FILM COATED ORAL at 08:20

## 2018-11-18 RX ADMIN — BUSPIRONE HYDROCHLORIDE 15 MG: 10 TABLET ORAL at 17:10

## 2018-11-18 RX ADMIN — PANTOPRAZOLE SODIUM 40 MG: 40 TABLET, DELAYED RELEASE ORAL at 05:24

## 2018-11-18 RX ADMIN — PREDNISONE 10 MG: 10 TABLET ORAL at 08:20

## 2018-11-18 RX ADMIN — IPRATROPIUM BROMIDE AND ALBUTEROL SULFATE 3 ML: .5; 3 SOLUTION RESPIRATORY (INHALATION) at 14:49

## 2018-11-18 RX ADMIN — HEPARIN SODIUM 5000 UNITS: 5000 INJECTION, SOLUTION INTRAVENOUS; SUBCUTANEOUS at 21:40

## 2018-11-18 RX ADMIN — IPRATROPIUM BROMIDE AND ALBUTEROL SULFATE 3 ML: .5; 3 SOLUTION RESPIRATORY (INHALATION) at 19:34

## 2018-11-18 RX ADMIN — OXYCODONE HYDROCHLORIDE 5 MG: 5 TABLET ORAL at 11:58

## 2018-11-18 RX ADMIN — METOPROLOL TARTRATE 25 MG: 25 TABLET, FILM COATED ORAL at 08:20

## 2018-11-18 RX ADMIN — ALPRAZOLAM 2 MG: 0.5 TABLET ORAL at 21:45

## 2018-11-18 RX ADMIN — VENLAFAXINE HYDROCHLORIDE 150 MG: 150 CAPSULE, EXTENDED RELEASE ORAL at 08:21

## 2018-11-18 RX ADMIN — INSULIN LISPRO 2 UNITS: 100 INJECTION, SOLUTION INTRAVENOUS; SUBCUTANEOUS at 21:41

## 2018-11-18 NOTE — PROGRESS NOTES
Progress Note - Pulmonary   Denzel Marino 60 y o  male MRN: [id number]  Unit/Bed#: [address] Encounter: [id number]      Assessment/Plan:     1  Worsening shortness of breath likely secondary to cough and deconditioning with associated atelectasis  Continue with BiPAP at hs and prn  Can go back to his CPAP upon discharge  Continue with supportive care for cough  Unable to get sputum culture however procalcitonin Is negative and doubt new infection  Respiratory pathogen profile is negative  Continue with Prednisone taper as ordered  Continue with Breo- does is increased (patient on Advair 250/50 at home) to assist with inflammation     2  Dysphagia eval- mild risk for aspiration; strict aspiration precautions      3  Severe deconditioning  PT eval obtained and recommendations appreciated  Recommend STR- patient is agreeable  4  Blood culture is coag negative staph- this is a contaminant  No further abx are required     Discussed with Dr Kirstie Cavazos      ______________________________________________________________________    Subjective: Pt seen and examined at bedside  Feels better today  Cough is better, he is able to take deeper breaths  Shortness of breath also improved   Used PAP last night    -Got 1 dose of vanco last night for 1 blood culture positive for gram positive cocci-     Vitals:   Temp:  [97 5 °F (36 4 °C)-99 3 °F (37 4 °C)] 99 3 °F (37 4 °C)  HR:  [] 103  Resp:  [18-22] 20  BP: (119-167)/(73-84) 145/73  Weight (last 2 days)     Date/Time   Weight    11/18/18 0600  133 (294 09)    11/17/18 0515  133 (293 43)    11/16/18 0602  (!)  137 (302 69)    11/16/18 0223  133 (293 21)            Oxygen Therapy  SpO2: 92 %  O2 Flow Rate (L/min): 2 L/min    Nutrition:        Diet Orders            Start     Ordered    11/16/18 1057  Room Service  Once     Question:  Type of Service  Answer:  Room Service-Appropriate    11/16/18 1056    11/16/18 0737  Diet Campos/CHO Controlled; Consistent Carbohydrate Diet Level 2 (5 carb servings/75 grams CHO/meal)  Diet effective now     Question Answer Comment   Diet Type Campos/CHO Controlled    Campos/CHO Controlled Consistent Carbohydrate Diet Level 2 (5 carb servings/75 grams CHO/meal)    RD to adjust diet per protocol? Yes        11/16/18 0738          Ins/Outs:   I/O       11/16 0701 - 11/17 0700 11/17 0701 - 11/18 0700 11/18 0701 - 11/19 0700    P  O  1035      Total Intake(mL/kg) 1035 (7 8)      Urine (mL/kg/hr) 6830 (2 1) 3100 (1) 300 (0 3)    Total Output 6830 3100 300    Net -5795 -3100 -300                 Lines/Drains:  Invasive Devices     Peripheral Intravenous Line            Peripheral IV 11/16/18 Left Forearm 2 days                 Active medications:  Scheduled Meds:  Current Facility-Administered Medications:  acetaminophen 650 mg Oral Q6H PRN Newby Deems, CRNP   ALPRAZolam 2 mg Oral HS PRN Newby Deems, CRNP   amLODIPine 5 mg Oral BID Newby Deems, CRNP   aspirin 81 mg Oral QAM Newby Deems, CRNP   atorvastatin 20 mg Oral QAM Newby Deems, CRNP   benzonatate 200 mg Oral TID Newby Deems, CRNP   busPIRone 15 mg Oral BID Newby Deems, CRNP   chlorhexidine 15 mL Swish & Spit Q12H [facility] Newby Deems, [address]   [START ON 11/19/2018] ergocalciferol 50,000 Units Oral Once per day on Mon Thu MANAS Huff   fenofibrate 145 mg Oral Daily Newby Deems, CRNP   fluticasone-vilanterol 1 puff Inhalation Daily Newby Deems, CRNP   heparin (porcine) 5,000 Units Subcutaneous Q8H [facility] MANAS Huff   hydrocodone-chlorpheniramine polistirex 5 mL Oral Q12H PRN Lolis Ellis MD   insulin glargine 40 Units Subcutaneous HS Eduarda Jean Baptiste PA-C   insulin lispro 2-12 Units Subcutaneous 4x Daily (AC & HS) Eduarda A Markel, PA-C   ipratropium-albuterol 3 mL Nebulization Q6H Eduarda Jean Baptiste PA-C   lidocaine 1 patch Topical Daily Lolis Ellis MD   lisinopril 20 mg Oral Daily MANAS Garcia   metoprolol tartrate 25 mg Oral Q12H [facility] Sheri Rocky, CRNP   mirtazapine 45 mg Oral HS Sheri Shenjaime, CRNP   oxyCODONE 5 mg Oral Q12H PRN Sheri Hidalgo, CRNP   pantoprazole 40 mg Oral Early Morning Sheri Banmaura, CRNP   phenol 1 spray Mouth/Throat Q2H PRN Eduarda Jean Baptiste PA-C   predniSONE 10 mg Oral Daily Sheri Rocky, CRNP   pregabalin 50 mg Oral TID Sheri Millsmaura, CRNP   QUEtiapine 400 mg Oral HS Sherinae Hidalgo, CRNP   venlafaxine 150 mg Oral QAM MANAS Huff     PRN Meds:  acetaminophen 650 mg Q6H PRN   ALPRAZolam 2 mg HS PRN   hydrocodone-chlorpheniramine polistirex 5 mL Q12H PRN   oxyCODONE 5 mg Q12H PRN   phenol 1 spray Q2H PRN     ____________________________________________________________________      Physical Exam   Constitutional: He is oriented to person, place, and time  He appears well-developed and well-nourished  No distress  HENT:   Head: Normocephalic and atraumatic  Mouth/Throat: Oropharynx is clear and moist  No oropharyngeal exudate  Eyes: Pupils are equal, round, and reactive to light  EOM are normal    Neck: Normal range of motion  Neck supple  Cardiovascular: Normal rate and regular rhythm  No murmur heard  Pulmonary/Chest: Effort normal and breath sounds normal  No respiratory distress  He has no wheezes  He has no rales  He exhibits no tenderness  Abdominal: Soft  Bowel sounds are normal  He exhibits no distension  There is no tenderness  Musculoskeletal: Normal range of motion  He exhibits no edema  Neurological: He is alert and oriented to person, place, and time  No cranial nerve deficit  Skin: Skin is warm and dry  He is not diaphoretic  Psychiatric: He has a normal mood and affect  His behavior is normal    Vitals reviewed            ____________________________________________________________________    Labs:   CBC:   Results from last 7 days  Lab Units 11/18/18  0507 11/16/18  0436 11/16/18  0024   WBC Thousand/uL 9 08 14 20* 17 15*   HEMOGLOBIN g/dL 13 5 12 6 13 7 HEMATOCRIT % 42 1 39 8 43 1   MCV fL 95 97 97   PLATELETS Thousands/uL 198 210 230     CMP:   Results from last 7 days  Lab Units 11/18/18  0507 11/17/18  0513 11/16/18  0917 11/16/18  0436 11/16/18  0024   POTASSIUM mmol/L 3 6 3 7 3 9 3 8 4 6   CHLORIDE mmol/L 99* 101 102 103 95*   CO2 mmol/L 25 27 25 20* 17*   BUN mg/dL 19 19 21 23 30*   CREATININE mg/dL 0 80 0 81 0 98 1 02 1 25   CALCIUM mg/dL 8 9 9 2 8 7 8 3 8 8   AST U/L  --   --   --  5 12   ALT U/L  --   --   --  23 28   ALK PHOS U/L  --   --   --  73 84   EGFR ml/min/1 73sq m 98 97 84 80 63     Magnesium:   Results from last 7 days  Lab Units 11/18/18  0507   MAGNESIUM mg/dL 1 9     Phosphorous:   Results from last 7 days  Lab Units 11/18/18  0507   PHOSPHORUS mg/dL 4 0     Troponin:   Results from last 7 days  Lab Units 11/16/18  0917 11/16/18  0438   TROPONIN I ng/mL <0 02 <0 02     PT/INR:   Results from last 7 days  Lab Units 11/16/18  0024   PTT seconds 25   INR  0 91     Lactic Acid:   Results from last 7 days  Lab Units 11/16/18  0024   LACTIC ACID mmol/L 1 8     BNP:   Results from last 7 days  Lab Units 11/16/18  0024   NT-PRO BNP pg/mL 82     TSH:   Results from last 7 days  Lab Units 11/16/18  0024   TSH 3RD GENERATON uIU/mL 0 545       Imaging: no new imaging      Micro: Lab Results   Component Value Date    BLOODCX No Growth at 48 hrs  11/16/2018    BLOODCX No Growth After 5 Days  10/31/2018    BLOODCX No Growth After 5 Days  10/31/2018    URINECX 6537-8413 cfu/ml Mixed Contaminants X2 03/20/2017    URINECX No Growth <1000 cfu/mL 11/27/2016    URINECX No Growth <1000 cfu/mL 09/02/2016    SPUTUMCULTUR Test not performed  Suggest repeat specimen   11/08/2017    SPUTUMCULTUR 1+ Growth of Staphylococcus aureus 03/20/2017    SPUTUMCULTUR 1+ Growth of Mixed Respiratory Francine 03/20/2017    MRSACULTURE  11/16/2018     No Methicillin Resistant Staphlyococcus aureus (MRSA) isolated        Results from last 7 days  Lab Units 11/16/18  0150   INFLUENZA B PCR  None Detected   RSV PCR  None Detected     Code Status: Level 1 - Full Code

## 2018-11-18 NOTE — ASSESSMENT & PLAN NOTE
Likely multifactorial from COPD, restrictive lung disease, deconditioning  Continue oxygen supplementation

## 2018-11-18 NOTE — PROGRESS NOTES
Progress Note Mena Los 1960, 60 y o  male MRN: [id number]    Unit/Bed#: [address]  Encounter: [id number]    Primary Care Provider: Carolee Garcia MD   Date and time admitted to hospital: 11/16/2018 12:17 AM        Shortness of breath   Assessment & Plan    Likely multifactorial from COPD, restrictive lung disease, deconditioning  Continue oxygen supplementation     * Acute on chronic respiratory failure with hypoxia ([facility] )   Assessment & Plan    Likely secondary to COPD and deconditioning  Continue 2 liters oxygen supplementation with CPAP q h s  COPD with exacerbation [facility])   Assessment & Plan    Patient was resuscitated with IV steroids and has been discharged on prednisone taper  Continue prednisone taper and nebulizers and Advair  Patient added on Tessalon Perles for cough     Positive blood culture   Assessment & Plan    Patient is 1/2 blood cultures was due which later turned out to be coagulase-negative Staphylococcus which is most likely contaminant     SHAVONNE (obstructive sleep apnea)   Assessment & Plan    Patient has mild obstructive sleep apnea  Continue CPAP 9 centimeter water with 2 liters oxygen q h s       Diabetes mellitus type 2, uncontrolled ([facility] )   Assessment & Plan    Lab Results   Component Value Date    HGBA1C 7 6 (H) 10/29/2018       Recent Labs      11/17/18   1611  11/17/18   2126  11/18/18   0748  11/18/18   1120   POCGLU  247*  209*  165*  243*       Blood Sugar Average: Last 72 hrs:  (P) 404 [id number]   Elevated blood sugar likely 2nd to steroids  Continue Lantus 40 units subcutaneously daily along with Humalog sliding scale     Morbid obesity (HCC)   Assessment & Plan    BMI was 40  Lifestyle modification     Esophageal reflux   Assessment & Plan    Continue PPI     CAD (coronary artery disease)   Assessment & Plan    Continue aspirin, metoprolol and statin     Bipolar affective disorder (HCC)   Assessment & Plan    Continue Effexor, Seroquel, Remeron, BuSpar, Xanax            VTE Pharmacologic Prophylaxis:   Pharmacologic: Heparin  Mechanical VTE Prophylaxis in Place: Yes    Patient Centered Rounds: I have performed bedside rounds with nursing staff today  Discussions with Specialists or Other Care Team Provider: Sunita Melgar  Education and Discussions with Family / Patient:Yes  Time Spent for Care: 30 minutes  More than 50% of total time spent on counseling and coordination of care as described above  Current Length of Stay: 2 day(s)  Current Patient Status: Inpatient     Discharge Plan: STR    Code Status: Level 1 - Full Code      Subjective:   Patient is feeling better today  Patient complains of right-sided chest pain especially with cough  Patient has improved shortness of breath  Denies any abdominal pain, nausea or vomiting      Objective:     Vitals:   Temp (24hrs), Av 5 °F (36 9 °C), Min:97 5 °F (36 4 °C), Max:99 3 °F (37 4 °C)    Temp:  [97 5 °F (36 4 °C)-99 3 °F (37 4 °C)] 99 3 °F (37 4 °C)  HR:  [] 103  Resp:  [20-22] 20  BP: (119-167)/(73-84) 145/73  SpO2:  [91 %-98 %] 93 %  Body mass index is 41 02 kg/m²  Input and Output Summary (last 24 hours): Intake/Output Summary (Last 24 hours) at 18 1546  Last data filed at 18 1258   Gross per 24 hour   Intake                0 ml   Output              300 ml   Net             -300 ml        Physical Exam:     Physical Exam   Constitutional: No distress  HENT:   Head: Normocephalic and atraumatic  Eyes: Pupils are equal, round, and reactive to light  Conjunctivae are normal    Neck: Normal range of motion  Neck supple  Cardiovascular: Normal rate, regular rhythm and normal heart sounds  Pulmonary/Chest: Effort normal  No respiratory distress  He has no wheezes  He has no rhonchi  He has no rales  He exhibits no tenderness  Decreased breath sounds bilaterally   Abdominal: Soft  Bowel sounds are normal  He exhibits no distension  There is no tenderness   There is no rebound and no guarding  Musculoskeletal: He exhibits edema  Trace edema around the ankles   Neurological: He is alert  No cranial nerve deficit  Skin: Skin is warm and dry  No rash noted  Additional Data:     Labs:      Results from last 7 days  Lab Units 11/18/18  0507 11/16/18  0436 11/16/18  0024   WBC Thousand/uL 9 08 14 20* 17 15*   HEMOGLOBIN g/dL 13 5 12 6 13 7   HEMATOCRIT % 42 1 39 8 43 1   PLATELETS Thousands/uL 198 210 230   NEUTROS PCT % 53  --   --        Results from last 7 days  Lab Units 11/18/18  0507 11/17/18  0513 11/16/18  0917 11/16/18  0436 11/16/18  0024   SODIUM mmol/L 136 137 138 138 133*   POTASSIUM mmol/L 3 6 3 7 3 9 3 8 4 6   CHLORIDE mmol/L 99* 101 102 103 95*   CO2 mmol/L 25 27 25 20* 17*   BUN mg/dL 19 19 21 23 30*   CREATININE mg/dL 0 80 0 81 0 98 1 02 1 25   CALCIUM mg/dL 8 9 9 2 8 7 8 3 8 8   TOTAL BILIRUBIN mg/dL  --   --   --  0 30 0 40   ALK PHOS U/L  --   --   --  73 84   ALT U/L  --   --   --  23 28   AST U/L  --   --   --  5 12       Results from last 7 days  Lab Units 11/16/18  0024   INR  0 91       Results from last 7 days  Lab Units 11/16/18  0917 11/16/18  0438 11/16/18  0024   TROPONIN I ng/mL <0 02 <0 02 <0 02     Lab Results   Component Value Date/Time    HGBA1C 7 6 (H) 10/29/2018 08:01 AM    HGBA1C 8 0 07/21/2017 09:27 AM       Results from last 7 days  Lab Units 11/18/18  1120 11/18/18  0748 11/17/18  2126 11/17/18  1611 11/17/18  1111 11/17/18  0659 11/16/18  2114 11/16/18  1555 11/16/18  1134 11/16/18  0722 11/16/18  0658 11/16/18  0604   POC GLUCOSE mg/dl 243* 165* 209* 247* 285* 173* 248* 336* 244* 125 58* 112       Results from last 7 days  Lab Units 11/16/18  0435 11/16/18  0024   LACTIC ACID mmol/L  --  1 8   PROCALCITONIN ng/ml <0 05  --        * I Have Reviewed All Lab Data Listed Above  * Additional Pertinent Lab Tests Reviewed:  [address] Admission Reviewed    Imaging:     VAS lower limb venous duplex study, complete bilateral Final Result by Sherren Reams, MD (11/16 1100)      XR chest 1 view portable   Final Result by Jada Kim DO (11/16 0204)      Low lung volumes  Mild platelike atelectasis suspected in the bilateral lower lung fields but no acute cardiopulmonary disease is seen  No significant interval change  Workstation performed: [id number]           Imaging Reports Reviewed by myself    Cultures:   Blood Culture:   Lab Results   Component Value Date    BLOODCX No Growth at 48 hrs  11/16/2018    BLOODCX Staphylococcus coagulase negative (A) 11/16/2018    BLOODCX No Growth After 5 Days  10/31/2018    BLOODCX No Growth After 5 Days  10/31/2018    BLOODCX No Growth After 5 Days  09/06/2017    BLOODCX No Growth After 5 Days   09/06/2017     Urine Culture:   Lab Results   Component Value Date    URINECX 6598-0462 cfu/ml Mixed Contaminants X2 03/20/2017    URINECX No Growth <1000 cfu/mL 11/27/2016    URINECX No Growth <1000 cfu/mL 09/02/2016     Sputum Culture: No components found for: SPUTUMCX  Wound Culture: No results found for: WOUNDCULT    Last 24 Hours Medication List:     Current Facility-Administered Medications:  acetaminophen 650 mg Oral Q6H PRN [place], CRNP   ALPRAZolam 2 mg Oral HS PRN Joseline Deed, CRNP   amLODIPine 5 mg Oral BID [place], CRNP   aspirin 81 mg Oral QAM [place], CRNP   atorvastatin 20 mg Oral QAM [place], CRNP   benzonatate 200 mg Oral TID [place], CRNP   busPIRone 15 mg Oral BID Joseline Deed, CRNP   chlorhexidine 15 mL Swish & Spit Q12H [address] [place] De[place], [address]   [START ON 11/19/2018] ergocalciferol 50,000 Units Oral Once per day on Mon Thu MANAS Huff   fenofibrate 145 mg Oral Daily [place] Deed, CRNP   fluticasone-vilanterol 1 puff Inhalation Daily [place] Deed, BETTIENP   heparin (porcine) 5,000 Units Subcutaneous Q8H [address] MANAS Huff   hydrocodone-chlorpheniramine polistirex 5 mL Oral Q12H PRN Sarahy Gideon Newman MD   insulin glargine 40 Units Subcutaneous HS Eduarda Jean Baptiste PA-C   insulin lispro 2-12 Units Subcutaneous 4x Daily (AC & HS) Eduarda Jean Baptiste PA-C   ipratropium-albuterol 3 mL Nebulization Q6H dEuarda Jean Baptiste PA-C   lidocaine 1 patch Topical Daily Jered Adan MD   lisinopril 20 mg Oral Daily MANAS Lovell   metoprolol tartrate 25 mg Oral Q12H [facility] MANAS Lovell   mirtazapine 45 mg Oral HS MANAS Lovell   oxyCODONE 5 mg Oral Q12H PRN MANAS Lovell   pantoprazole 40 mg Oral Early Morning MANAS [place]ell   phenol 1 spray Mouth/Throat Q2H PRN Eduarda Jean Baptiste PA-C   predniSONE 10 mg Oral Daily Jered Adan MD   Followed by       Minor Gannon ON 11/21/2018] predniSONE 5 mg Oral Daily Jered Adan MD   pregabalin 50 mg Oral TID MANAS Lovell   QUEtiapine 400 mg Oral HS MANAS Lovell   venlafaxine 150 mg Oral QAM MANAS Lovell        Today, Patient Was Seen By: Emily Pagan MD    ** Please Note: Dragon 360 Dictation voice to text software may have been used in the creation of this document   **

## 2018-11-18 NOTE — ASSESSMENT & PLAN NOTE
Patient was resuscitated with IV steroids and has been discharged on prednisone taper  Continue prednisone taper and nebulizers and Advair  Patient added on Tessalon Perles for cough

## 2018-11-18 NOTE — PROGRESS NOTES
11/18/18 [address] [place] [address]   Patient Information   Mental Status Alert   Primary Caregiver Self   Support System Extended family   Activities of Daily Living Prior to Admission   Functional Status Minimum assistance   Assistive Device No device needed   Living Arrangement House   Ambulation Minimum assistance   Means of Transportation   Means of Transport to Appts: Drive Self   DASH discussion completed  Discussed goals of making sure pt's needs are met upon discharge, pt's preferences are taken into account, pt understands her health condition, medications and symptoms to watch for after returning home and pt is aware of any follow up appointments recommended by hospital physician  KENDAL spoke with the pt at the bedside  Pt lives with a roommate and noted that he has no DME with exception to his CPAP machine/neb/O2 through [facility]   Pt reports that normally he is independent however noted increased need for further Rehab  Pt was recommended and agreeable to STR and choices were made for CCP and/or CCB in that order  Pt has a facility listing and D/W the Medicare Bundle preferred provider list   Will discuss other choices if needed    Pt does drive and he uses the [facility] pharmacy in [place], [place]

## 2018-11-18 NOTE — ASSESSMENT & PLAN NOTE
Patient is 1/2 blood cultures was due which later turned out to be coagulase-negative Staphylococcus which is most likely contaminant

## 2018-11-18 NOTE — ASSESSMENT & PLAN NOTE
Likely secondary to COPD and deconditioning  Continue 2 liters oxygen supplementation with CPAP q h s

## 2018-11-18 NOTE — PROGRESS NOTES
Notified by RN - blood culture 1/2 with gram + cocci in clusters  Pt noted to have leukocytosis but a negative procalcitonin  Will order a one time dose of vancomycin and defer to primary attending regarding continued treatment vs ID consult

## 2018-11-18 NOTE — PLAN OF CARE
Problem: DISCHARGE PLANNING - CARE MANAGEMENT  Goal: Discharge to post-acute care or home with appropriate resources  INTERVENTIONS:  - Conduct assessment to determine patient/family and health care team treatment goals, and need for post-acute services based on payer coverage, community resources, and patient preferences, and barriers to discharge  - Address psychosocial, clinical, and financial barriers to discharge as identified in assessment in conjunction with the patient/family and health care team  - Arrange appropriate level of post-acute services according to patient's   needs and preference and payer coverage in collaboration with the physician and health care team  - Communicate with and update the patient/family, physician, and health care team regarding progress on the discharge plan  - Arrange appropriate transportation to post-acute venues  To STR  Outcome: Progressing

## 2018-11-18 NOTE — PLAN OF CARE
GASTROINTESTINAL - ADULT     Maintains or returns to baseline bowel function Progressing     Maintains adequate nutritional intake Progressing        METABOLIC, FLUID AND ELECTROLYTES - ADULT     Electrolytes maintained within normal limits Progressing     Fluid balance maintained Progressing     Glucose maintained within target range Progressing        Potential for Falls     Patient will remain free of falls Progressing        Prexisting or High Potential for Compromised Skin Integrity     Skin integrity is maintained or improved Progressing        RESPIRATORY - ADULT     Achieves optimal ventilation and oxygenation Progressing

## 2018-11-18 NOTE — ASSESSMENT & PLAN NOTE
Lab Results   Component Value Date    HGBA1C 7 6 (H) 10/29/2018       Recent Labs      11/17/18   1611  11/17/18   2126  11/18/18   0748  11/18/18   1120   POCGLU  247*  209*  165*  243*       Blood Sugar Average: Last 72 hrs:  (P) 292 [id number]   Elevated blood sugar likely 2nd to steroids  Continue Lantus 40 units subcutaneously daily along with Humalog sliding scale

## 2018-11-18 NOTE — ASSESSMENT & PLAN NOTE
Patient has mild obstructive sleep apnea  Continue CPAP 9 centimeter water with 2 liters oxygen q h s

## 2018-11-19 VITALS
WEIGHT: 286 LBS | DIASTOLIC BLOOD PRESSURE: 87 MMHG | RESPIRATION RATE: 20 BRPM | HEIGHT: 71 IN | HEART RATE: 92 BPM | SYSTOLIC BLOOD PRESSURE: 135 MMHG | TEMPERATURE: 98.2 F | BODY MASS INDEX: 40.04 KG/M2 | OXYGEN SATURATION: 92 %

## 2018-11-19 LAB
BACTERIA BLD CULT: ABNORMAL
GLUCOSE SERPL-MCNC: 229 MG/DL (ref 65–140)
GLUCOSE SERPL-MCNC: 280 MG/DL (ref 65–140)
GLUCOSE SERPL-MCNC: 284 MG/DL (ref 65–140)
GRAM STN SPEC: ABNORMAL

## 2018-11-19 PROCEDURE — 94668 MNPJ CHEST WALL SBSQ: CPT

## 2018-11-19 PROCEDURE — 94660 CPAP INITIATION&MGMT: CPT

## 2018-11-19 PROCEDURE — 82948 REAGENT STRIP/BLOOD GLUCOSE: CPT

## 2018-11-19 PROCEDURE — 99239 HOSP IP/OBS DSCHRG MGMT >30: CPT | Performed by: INTERNAL MEDICINE

## 2018-11-19 PROCEDURE — 94760 N-INVAS EAR/PLS OXIMETRY 1: CPT

## 2018-11-19 PROCEDURE — 94640 AIRWAY INHALATION TREATMENT: CPT

## 2018-11-19 RX ORDER — PREDNISONE 10 MG/1
10 TABLET ORAL DAILY
Refills: 0
Start: 2018-11-20 | End: 2018-11-22

## 2018-11-19 RX ORDER — CHLORHEXIDINE GLUCONATE 0.12 MG/ML
15 RINSE ORAL 2 TIMES DAILY
Qty: 120 ML | Refills: 0 | Status: SHIPPED | OUTPATIENT
Start: 2018-11-19 | End: 2018-11-24

## 2018-11-19 RX ORDER — LIDOCAINE 50 MG/G
1 PATCH TOPICAL DAILY
Qty: 30 PATCH | Refills: 0
Start: 2018-11-20 | End: 2020-05-22

## 2018-11-19 RX ORDER — HYDROCODONE POLISTIREX AND CHLORPHENIRAMINE POLISTIREX 10; 8 MG/5ML; MG/5ML
5 SUSPENSION, EXTENDED RELEASE ORAL EVERY 12 HOURS PRN
Qty: 120 ML | Refills: 0
Start: 2018-11-19 | End: 2018-11-29

## 2018-11-19 RX ORDER — OXYCODONE HYDROCHLORIDE 5 MG/1
5 TABLET ORAL EVERY 6 HOURS PRN
Qty: 10 TABLET | Refills: 0 | Status: SHIPPED | OUTPATIENT
Start: 2018-11-19 | End: 2018-11-29

## 2018-11-19 RX ORDER — IPRATROPIUM BROMIDE AND ALBUTEROL SULFATE 2.5; .5 MG/3ML; MG/3ML
3 SOLUTION RESPIRATORY (INHALATION)
Refills: 0
Start: 2018-11-19 | End: 2019-08-10 | Stop reason: HOSPADM

## 2018-11-19 RX ORDER — PREDNISONE 1 MG/1
5 TABLET ORAL DAILY
Qty: 3 TABLET | Refills: 0
Start: 2018-11-21 | End: 2018-11-21

## 2018-11-19 RX ADMIN — FENOFIBRATE 145 MG: 145 TABLET ORAL at 08:34

## 2018-11-19 RX ADMIN — CHLORHEXIDINE GLUCONATE 0.12% ORAL RINSE 15 ML: 1.2 LIQUID ORAL at 08:43

## 2018-11-19 RX ADMIN — HEPARIN SODIUM 5000 UNITS: 5000 INJECTION, SOLUTION INTRAVENOUS; SUBCUTANEOUS at 06:10

## 2018-11-19 RX ADMIN — ATORVASTATIN CALCIUM 20 MG: 20 TABLET, FILM COATED ORAL at 08:35

## 2018-11-19 RX ADMIN — PREGABALIN 50 MG: 50 CAPSULE ORAL at 17:10

## 2018-11-19 RX ADMIN — FLUTICASONE FUROATE AND VILANTEROL TRIFENATATE 1 PUFF: 200; 25 POWDER RESPIRATORY (INHALATION) at 08:37

## 2018-11-19 RX ADMIN — IPRATROPIUM BROMIDE AND ALBUTEROL SULFATE 3 ML: .5; 3 SOLUTION RESPIRATORY (INHALATION) at 07:45

## 2018-11-19 RX ADMIN — BUSPIRONE HYDROCHLORIDE 15 MG: 10 TABLET ORAL at 08:34

## 2018-11-19 RX ADMIN — BENZONATATE 200 MG: 100 CAPSULE ORAL at 08:33

## 2018-11-19 RX ADMIN — OXYCODONE HYDROCHLORIDE 5 MG: 5 TABLET ORAL at 12:50

## 2018-11-19 RX ADMIN — INSULIN LISPRO 6 UNITS: 100 INJECTION, SOLUTION INTRAVENOUS; SUBCUTANEOUS at 12:51

## 2018-11-19 RX ADMIN — LISINOPRIL 20 MG: 20 TABLET ORAL at 08:33

## 2018-11-19 RX ADMIN — AMLODIPINE BESYLATE 5 MG: 5 TABLET ORAL at 08:34

## 2018-11-19 RX ADMIN — LIDOCAINE 1 PATCH: 50 PATCH CUTANEOUS at 06:11

## 2018-11-19 RX ADMIN — ASPIRIN 81 MG 81 MG: 81 TABLET ORAL at 08:35

## 2018-11-19 RX ADMIN — IPRATROPIUM BROMIDE AND ALBUTEROL SULFATE 3 ML: .5; 3 SOLUTION RESPIRATORY (INHALATION) at 13:21

## 2018-11-19 RX ADMIN — ERGOCALCIFEROL 50000 UNITS: 1.25 CAPSULE ORAL at 08:36

## 2018-11-19 RX ADMIN — PREDNISONE 10 MG: 10 TABLET ORAL at 08:35

## 2018-11-19 RX ADMIN — METOPROLOL TARTRATE 25 MG: 25 TABLET, FILM COATED ORAL at 08:35

## 2018-11-19 RX ADMIN — HEPARIN SODIUM 5000 UNITS: 5000 INJECTION, SOLUTION INTRAVENOUS; SUBCUTANEOUS at 14:21

## 2018-11-19 RX ADMIN — INSULIN LISPRO 4 UNITS: 100 INJECTION, SOLUTION INTRAVENOUS; SUBCUTANEOUS at 08:38

## 2018-11-19 RX ADMIN — IPRATROPIUM BROMIDE AND ALBUTEROL SULFATE 3 ML: .5; 3 SOLUTION RESPIRATORY (INHALATION) at 01:57

## 2018-11-19 RX ADMIN — PANTOPRAZOLE SODIUM 40 MG: 40 TABLET, DELAYED RELEASE ORAL at 06:11

## 2018-11-19 RX ADMIN — INSULIN LISPRO 6 UNITS: 100 INJECTION, SOLUTION INTRAVENOUS; SUBCUTANEOUS at 16:53

## 2018-11-19 RX ADMIN — PREGABALIN 50 MG: 50 CAPSULE ORAL at 08:36

## 2018-11-19 RX ADMIN — VENLAFAXINE HYDROCHLORIDE 150 MG: 150 CAPSULE, EXTENDED RELEASE ORAL at 08:37

## 2018-11-19 NOTE — SPEECH THERAPY NOTE
Upon my arrival patient had just finished his lunch tray and is pending discharge  Recommend follow up with [facility] upon [facility]      HIREN Elias , [address] [facility]  Speech Language Pathologist   [id number]

## 2018-11-19 NOTE — SOCIAL WORK
Discharge ordered  Pt will be transferred to [facility], [facility]  for skilled rehab  Wheelchair van with oxygen transport arranged through [employer] (#[id number])  [facility] anticipated to  approximately 4:30 pm   [facility]), CCB and pt aware of plan

## 2018-11-19 NOTE — [place]IVERS[place] TRANSF[place] FORM
[place]  (ALL ITEMS MUST BE COMPLETED)    1  TRANSFER FROM: [address]      TRANSFER TO: [facility]    2  DATE OF TRANSFER: 11/19/2018                        TIME OF TRANSFER: 1630    3  PATIENT NAME: APOLINAR Crowe      YOB: 1960                             GENDER: male    4  LANGUAGE:   English    5  PHYSICIAN NAME:  Max Alford MD                   PHONE: [phone] 6  CODE STATUS: Level 1 - Full Code        Out of Hospital DNR Attached: No    7  :                                      :  Extended Emergency Contact Information  Primary Emergency Contact: Garrett Javier   [place]  Mobile Phone: [phone]  Relation: Friend  Secondary Emergency Contact: Ellen Watson  Address: [address] Phone: [phone]  Mobile Phone: [phone]  Relation: [id number] [facility] Representative/Proxy:  No           Legal Guardian:  No             NAME OF:           HEALTH CARE REPRESENTATIVE/PROXY:                                         OR           LEGAL GUARDIAN, IF NOT :                                               PHONE:  (Day)           (Night)                        (Cell)    8  REASON FOR TRANSFER: (Must include brief medical history and recent changes in physical function or cognition ) Acute on chronic respiratory failure             V/S: /87 (BP Location: Right arm)   Pulse 92   Temp 98 2 °F (36 8 °C) (Oral)   Resp 20   Ht 5' 11" (1 803 m)   Wt 130 kg (286 lb)   SpO2 92%   BMI 39 89 kg/m²           PAIN: Yes, Rating 0-10, Site chest  and Treatment Roxicodone     9  PRIMARY DIAGNOSIS: Acute on chronic respiratory failure with hypoxia (HCC)      Secondary Diagnosis:         Pacemaker: No      Internal Defib: No          Mental Health Diagnosis (if Applicable):    10  RESTRAINTS: No     11  RESPIRATORY NEEDS: NC, 2L/min; CPAP at bedtime    12  ISOLATION/PRECAUTION: None    13  ALLERGY: Wellbutrin [bupropion]    14  SENSORY:       Vision Poor and Glasses    15  SKIN CONDITION: No Wounds    16  DIET: Regular    17  IV ACCESS: None    18  PERSONAL ITEMS SENT WITH PATIENT: Glasses and [id number] [employer] phone; medications;     19  ATTACHED DOCUMENTS: MUST ATTACH CURRENT MEDICATION INFORMATION Face Sheet, MAR, Medication Reconciliation, Diagnostic Studies, Labs, Respiratory Care, Code Status, Discharge Summary, PT Note, OT Note and HX/PE    20  AT RISK ALERTS:Falls        HARM TO: N/A    21  WEIGHT BEARING STATUS:         Left Leg: Full        Right Leg: Full    22  MENTAL STATUS:Alert and Oriented    23  FUNCTION:        Walk: With Help        Transfer: Self        Toilet: Self        Feed: Self    24  IMMUNIZATIONS/SCREENING:     Immunization History   Administered Date(s) Administered    Hep B, adult 12/22/2008, 03/26/2009, 12/08/2009    Influenza Quadrivalent Preservative Free 3 years and older IM 09/25/2017    Influenza TIV (IM) 10/14/2003, 12/28/2004, 10/14/2005, 10/29/2007, 11/14/2008, 10/05/2009, 01/05/2011, 09/28/2011, 10/26/2012, 09/04/2013, 10/11/2014, 09/08/2015, 09/28/2016    Influenza, injectable, quadrivalent, preservative free 0 5 mL 10/08/2018    MMR 12/04/2008, 03/26/2009    Meningococcal, Unknown Serogroups 12/22/2008    Pneumococcal Conjugate 13-Valent 09/08/2015, 11/29/2016    Pneumococcal Polysaccharide PPV23 10/14/2003    Tdap 12/04/2008    Tuberculin Skin Test-PPD Intradermal 10/30/2007, 05/14/2009, 06/02/2009, 04/20/2010, 05/04/2010       25  BOWEL: Continent and Date Last BM11/16/2018    26  BLADDER: Continent    27   SENDING FACILITY CONTACT:                 Title:         Unit:         Phone:           [address] (if known):        Title:        Unit:         Phone:         FORM PREFILLED BY (if applicable)       Title:       Unit:        Phone: FORM COMPLETED BY Loki Mobley RN      Title: RN      Phone: [phone]

## 2018-11-19 NOTE — [place] FOR[place] UNIV[place] FORM  (ALL ITEMS MUST BE COMPLETED)    1  TRANSFER FROM: [address]      TRANSFER TO: [facility], [facility]      2  DATE OF TRANSFER: 11/19/2018                        TIME OF TRANSFER: 1630    3  PATIENT NAME: APOLINAR Valentino      YOB: 1960                             GENDER: male    4  LANGUAGE:   English    5  PHYSICIAN NAME:  Loren Wagner MD                   PHONE: [phone]  CODE STATUS: Level 1 - Full Code        Out of Hospital DNR Attached: No    7  :                                      :  Extended Emergency Contact Information  Primary Emergency Contact: Garrett Javier   [place]  Mobile Phone: [phone]  Relation: Friend  Secondary Emergency Contact: Ellen Watson  Address: [place] [address] [address] of [address] [place] [address] Phone: [phone]  Mobile Phone: [phone]  Relation: [phone] [facility] Representative/Proxy:  No           Legal Guardian:  No             NAME OF:           HEALTH CARE REPRESENTATIVE/PROXY:                                         OR           LEGAL GUARDIAN, IF NOT :                                               PHONE:  (Day)           (Night)                        (Cell)    8  REASON FOR TRANSFER: (Must include brief medical history and recent changes in physical function or cognition ) acute on chronic respiratory failure             V/S: /87 (BP Location: Right arm)   Pulse 92   Temp 98 2 °F (36 8 °C) (Oral)   Resp 20   Ht 5' 11" (1 803 m)   Wt 130 kg (286 lb)   SpO2 92%   BMI 39 89 kg/m²           PAIN: Yes, Rating 0-10, Site chest and Treatment roxicodone    9  PRIMARY DIAGNOSIS: Acute on chronic respiratory failure with hypoxia (HCC)      Secondary Diagnosis:         Pacemaker: No      Internal Defib: No          Mental Health Diagnosis (if Applicable):    10  RESTRAINTS: No     11  RESPIRATORY NEEDS: Oxygen Device NC,, Flow rate: 2L/min and CPAP    12  ISOLATION/PRECAUTION: None    13  ALLERGY: Wellbutrin [bupropion]    14  SENSORY:       Vision Poor and Glasses    15  SKIN CONDITION: No Wounds    16  DIET: Regular    17  IV ACCESS: None    18  PERSONAL ITEMS SENT WITH PATIENT: Glasses and Othermedications, cell phone    19  ATTACHED DOCUMENTS: MUST ATTACH CURRENT MEDICATION INFORMATION Face Sheet, MAR, Medication Reconciliation, Diagnostic Studies, Labs, Respiratory Care, Code Status, Discharge Summary, PT Note, OT Note and HX/PE    20  AT RISK ALERTS:Falls        HARM TO: N/A    21  WEIGHT BEARING STATUS:         Left Leg: Full        Right Leg: Full    22  MENTAL STATUS:Alert and Oriented    23  FUNCTION:        Walk: With Help        Transfer: Self        Toilet: Self        Feed: Self    24  IMMUNIZATIONS/SCREENING:     Immunization History   Administered Date(s) Administered    Hep B, adult 12/22/2008, 03/26/2009, 12/08/2009    Influenza Quadrivalent Preservative Free 3 years and older IM 09/25/2017    Influenza TIV (IM) 10/14/2003, 12/28/2004, 10/14/2005, 10/29/2007, 11/14/2008, 10/05/2009, 01/05/2011, 09/28/2011, 10/26/2012, 09/04/2013, 10/11/2014, 09/08/2015, 09/28/2016    Influenza, injectable, quadrivalent, preservative free 0 5 mL 10/08/2018    MMR 12/04/2008, 03/26/2009    Meningococcal, Unknown Serogroups 12/22/2008    Pneumococcal Conjugate 13-Valent 09/08/2015, 11/29/2016    Pneumococcal Polysaccharide PPV23 10/14/2003    Tdap 12/04/2008    Tuberculin Skin Test-PPD Intradermal 10/30/2007, 05/14/2009, 06/02/2009, 04/20/2010, 05/04/2010       25  BOWEL: Continent and Date Last BM11/16/18    32  BLADDER: Continent    27   SENDING FACILITY CONTACT:                   Title:         Unit:         Phone:           [address] (if known):        Title:        Unit:         Phone:         FORM PREFILLED BY (if applicable)       Title: Unit:        Phone:         Analia Valdovinos RN    Title: RN      Phone: [phone]

## 2018-11-19 NOTE — NURSING NOTE
Pt left via wheelchair accompanied by transport team  Discharge instructions at report given to [facility], [facility]  IV dc and intact  Non-smoker  Up to date with immunizations  No further questions at this time

## 2018-11-19 NOTE — PLAN OF CARE
DISCHARGE PLANNING - CARE MANAGEMENT     Discharge to post-acute care or home with appropriate resources Progressing        GASTROINTESTINAL - ADULT     Maintains or returns to baseline bowel function Progressing     Maintains adequate nutritional intake Progressing        METABOLIC, FLUID AND ELECTROLYTES - ADULT     Electrolytes maintained within normal limits Progressing     Fluid balance maintained Progressing     Glucose maintained within target range Progressing        Potential for Falls     Patient will remain free of falls Progressing        Prexisting or High Potential for Compromised Skin Integrity     Skin integrity is maintained or improved Progressing        RESPIRATORY - ADULT     Achieves optimal ventilation and oxygenation Progressing

## 2018-11-20 ENCOUNTER — PATIENT OUTREACH (OUTPATIENT)
Dept: CASE MANAGEMENT | Facility: HOSPITAL | Age: 58
End: 2018-11-20

## 2018-11-20 NOTE — PROGRESS NOTES
S/W Bipin Cruz, nurse at [facility]  States pt is doing well " I dont have him, but the night report said he was doing fine"  Did verify that he was wearing his cpap from home  Requested a social work visit with pt asap to explore any home support needs, as he does not live with family but has a roommate and has multiple comorbidities and is on many medications  Financial capability with meds should be explored as well as transportation  Bipin Cruz agreed and will pursue

## 2018-11-20 NOTE — ASSESSMENT & PLAN NOTE
Patient was treated with IV steroids and has been discharged on prednisone taper during last admission  Continue prednisone taper and nebulizers and Advair  Continue Tessalon Perles

## 2018-11-20 NOTE — ASSESSMENT & PLAN NOTE
Resolved  Likely secondary to COPD and deconditioning  Continue 2 liters oxygen supplementation with CPAP q h s

## 2018-11-20 NOTE — ASSESSMENT & PLAN NOTE
Lab Results   Component Value Date    HGBA1C 7 6 (H) 10/29/2018       Recent Labs      11/18/18   2102  11/19/18   0709  11/19/18   1121  11/19/18   1606   POCGLU  195*  229*  280*  284*       Blood Sugar Average: Last 72 hrs:  (P) 784 [id number]   Elevated blood sugar likely 2nd to steroids  Continue Lantus 40 units subcutaneously daily along with Humalog sliding scale  Continue metformin and Victoza

## 2018-11-21 ENCOUNTER — OFFICE VISIT (OUTPATIENT)
Dept: PULMONOLOGY | Facility: MEDICAL CENTER | Age: 58
End: 2018-11-21
Payer: MEDICARE

## 2018-11-21 VITALS
TEMPERATURE: 96.6 F | SYSTOLIC BLOOD PRESSURE: 132 MMHG | BODY MASS INDEX: 37.66 KG/M2 | HEART RATE: 115 BPM | RESPIRATION RATE: 20 BRPM | HEIGHT: 71 IN | WEIGHT: 269 LBS | DIASTOLIC BLOOD PRESSURE: 78 MMHG | OXYGEN SATURATION: 92 %

## 2018-11-21 DIAGNOSIS — J96.11 CHRONIC RESPIRATORY FAILURE WITH HYPOXIA (HCC): ICD-10-CM

## 2018-11-21 DIAGNOSIS — J96.21 ACUTE ON CHRONIC RESPIRATORY FAILURE WITH HYPOXIA (HCC): Primary | ICD-10-CM

## 2018-11-21 DIAGNOSIS — J98.4 LUNG DISEASE, RESTRICTIVE: ICD-10-CM

## 2018-11-21 DIAGNOSIS — G47.33 OSA (OBSTRUCTIVE SLEEP APNEA): ICD-10-CM

## 2018-11-21 DIAGNOSIS — J43.2 CENTRILOBULAR EMPHYSEMA (HCC): ICD-10-CM

## 2018-11-21 PROBLEM — E66.2 OBESITY HYPOVENTILATION SYNDROME (HCC): Status: ACTIVE | Noted: 2018-11-21

## 2018-11-21 LAB — BACTERIA BLD CULT: NORMAL

## 2018-11-21 PROCEDURE — 99214 OFFICE O/P EST MOD 30 MIN: CPT | Performed by: PHYSICIAN ASSISTANT

## 2018-11-21 NOTE — PATIENT INSTRUCTIONS
COPD / Centrilobular Emphysema  -finish advair > then start Trelegy  -d/c prednisone    SHAVONNE:  -continue CPAP 10 cm     Chronic Hypoxemic Respiratory Failure  -continue 02 > attempt wean down oxygen to 1 5 > then 1 > then 0 5 > then off as tolerated for SPO2 (PULSE OX) 90-94% maintained      Obesity:  -attempt weight loss
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
## 2018-11-23 NOTE — ASSESSMENT & PLAN NOTE
-Emphysematous COPD  -will f/u w/ CPFT and diffusion capacity  -cont ATC nebs BID to TID  -cont ICS/LABA  -change to Trelegy

## 2018-11-23 NOTE — ASSESSMENT & PLAN NOTE
-patient has restrictive defect on most recent spirometry  -ratio was 95% with forced vital capacity of 41%  -requires complete PFT at next follow up

## 2018-11-23 NOTE — ASSESSMENT & PLAN NOTE
-was previously on BiPAP in the hospital  -now is currently on CPAP 10 cm water  -patient reports compliance  -weight loss to benefit with likely improvement and SHAVONNE > we discussed this at this office visit

## 2018-12-03 ENCOUNTER — PATIENT OUTREACH (OUTPATIENT)
Dept: ADMINISTRATIVE | Facility: HOSPITAL | Age: 58
End: 2018-12-03

## 2018-12-03 NOTE — PROGRESS NOTES
Followup call to [facility]  Staff member did not offer much information on Alonzo's progress "this is the first day I met him" but verified he will be dc'd either today or tomorrow  Left message on home line and requested call back  Will re-confirm plan from pulmonary office visit last week and ensure pt is aware that he should follow back for continued pulmonary management  Kacey Yee returned the call, states he is feeling much better  Ambulating freely around the nursing unit, is no longer on oxygen  Reminded him of followup pulmonary appt that should be scheduled within the month  HE has CPAP at home and plans to use it @  as prescribed

## 2018-12-04 ENCOUNTER — TELEPHONE (OUTPATIENT)
Dept: FAMILY MEDICINE CLINIC | Facility: CLINIC | Age: 58
End: 2018-12-04

## 2018-12-06 ENCOUNTER — TRANSITIONAL CARE MANAGEMENT (OUTPATIENT)
Dept: FAMILY MEDICINE CLINIC | Facility: CLINIC | Age: 58
End: 2018-12-06

## 2018-12-10 ENCOUNTER — TRANSITIONAL CARE MANAGEMENT (OUTPATIENT)
Dept: FAMILY MEDICINE CLINIC | Facility: CLINIC | Age: 58
End: 2018-12-10

## 2018-12-10 ENCOUNTER — PATIENT OUTREACH (OUTPATIENT)
Dept: OTHER | Facility: HOSPITAL | Age: 58
End: 2018-12-10

## 2018-12-10 NOTE — PROGRESS NOTES
Patient states he experiences a "little" SOB w/exertion  A productive cough w/a moderate amount of white sputum persists  He c/o chest "discomfort" 6/10 r/t frequent cough, but uses splinting & takes pain medication as ordered with result  He is using O2 @ 2LPM via n/c PRN & w/his CPAP qHS  He completed the prednisone taper while in [facility] & is using his duoneb 3-4x/day  He will be meeting with a dietician from [facility] recommended he schedule an appointment with the dietician in [facility] as well  He was excited about that idea & will call them when he is feeling better/stronger  He has been doing his exercises 2x/day & steps at least once a day  He will see his PCP on 12/11 & already scheduled transport with [facility]  He has a friend who can take him to his 12/19 appointment with pulmonology  Will re-mail [facility] letter

## 2018-12-11 ENCOUNTER — OFFICE VISIT (OUTPATIENT)
Dept: FAMILY MEDICINE CLINIC | Facility: CLINIC | Age: 58
End: 2018-12-11
Payer: MEDICARE

## 2018-12-11 VITALS
HEIGHT: 71 IN | BODY MASS INDEX: 40.6 KG/M2 | TEMPERATURE: 96 F | SYSTOLIC BLOOD PRESSURE: 109 MMHG | WEIGHT: 290 LBS | HEART RATE: 98 BPM | OXYGEN SATURATION: 97 % | DIASTOLIC BLOOD PRESSURE: 84 MMHG | RESPIRATION RATE: 20 BRPM

## 2018-12-11 DIAGNOSIS — J44.9 CHRONIC OBSTRUCTIVE PULMONARY DISEASE, UNSPECIFIED COPD TYPE (HCC): Primary | ICD-10-CM

## 2018-12-11 DIAGNOSIS — J43.2 CENTRILOBULAR EMPHYSEMA (HCC): ICD-10-CM

## 2018-12-11 DIAGNOSIS — I48.91 ATRIAL FIBRILLATION, UNSPECIFIED TYPE (HCC): ICD-10-CM

## 2018-12-11 DIAGNOSIS — E11.65 UNCONTROLLED TYPE 2 DIABETES MELLITUS WITH HYPERGLYCEMIA (HCC): Chronic | ICD-10-CM

## 2018-12-11 DIAGNOSIS — M62.838 MUSCLE SPASM: ICD-10-CM

## 2018-12-11 DIAGNOSIS — I10 BENIGN ESSENTIAL HYPERTENSION: ICD-10-CM

## 2018-12-11 PROBLEM — A41.9 SEPSIS DUE TO UNDETERMINED ORGANISM (HCC): Status: RESOLVED | Noted: 2018-10-31 | Resolved: 2018-12-11

## 2018-12-11 PROBLEM — J96.21 ACUTE ON CHRONIC RESPIRATORY FAILURE WITH HYPOXIA (HCC): Status: RESOLVED | Noted: 2018-10-31 | Resolved: 2018-12-11

## 2018-12-11 PROBLEM — J96.11 CHRONIC RESPIRATORY FAILURE WITH HYPOXIA (HCC): Status: RESOLVED | Noted: 2018-11-21 | Resolved: 2018-12-11

## 2018-12-11 PROBLEM — I48.20 CHRONIC ATRIAL FIBRILLATION (HCC): Status: ACTIVE | Noted: 2018-12-11

## 2018-12-11 PROBLEM — E11.10 DIABETIC KETOACIDOSIS WITHOUT COMA ASSOCIATED WITH TYPE 2 DIABETES MELLITUS (HCC): Status: RESOLVED | Noted: 2018-11-16 | Resolved: 2018-12-11

## 2018-12-11 PROBLEM — R78.81 POSITIVE BLOOD CULTURE: Status: RESOLVED | Noted: 2018-11-18 | Resolved: 2018-12-11

## 2018-12-11 PROBLEM — E66.2 OBESITY HYPOVENTILATION SYNDROME (HCC): Status: RESOLVED | Noted: 2018-11-21 | Resolved: 2018-12-11

## 2018-12-11 PROCEDURE — 93000 ELECTROCARDIOGRAM COMPLETE: CPT | Performed by: FAMILY MEDICINE

## 2018-12-11 PROCEDURE — 99495 TRANSJ CARE MGMT MOD F2F 14D: CPT | Performed by: FAMILY MEDICINE

## 2018-12-11 RX ORDER — MIRTAZAPINE 30 MG/1
TABLET, FILM COATED ORAL
Refills: 5 | COMMUNITY
Start: 2018-11-05 | End: 2019-03-26 | Stop reason: HOSPADM

## 2018-12-11 RX ORDER — METHOCARBAMOL 500 MG/1
500 TABLET, FILM COATED ORAL
Qty: 30 TABLET | Refills: 0 | Status: SHIPPED | OUTPATIENT
Start: 2018-12-11 | End: 2019-06-11

## 2018-12-11 NOTE — PATIENT INSTRUCTIONS
Leg swelling: Johnathon compression stocking till [facility] Victoza again please   Recommend to see pulmonary; cardiology; endocrinology, Angelica Parcel

## 2018-12-11 NOTE — PROGRESS NOTES
TRANSITION OF CARE OFFICE VISIT  [facility] - [facility]    NAME: Shilpi Perry  AGE: 60 y o  SEX: male  : 1960     DATE: 2018     Assessment and Plan:   50-year-old presents to the office for      1) COPD without exacerbation  -patient currently using nebulizer treatments 3 to 4 times a day  As prescribed by his discharge  At this time I did not appreciate any wheezing  Patient maintained a good O2 sat between 92-96%  -continues in inhalers as needed  -continue using O2 at 2 L PRN and CPAP 10 at nighttime    2) Afib  - Patient states he has had this condition for a long time requiring Metoprolol 25 mgs BID  - EKG confirmed Afib at this visit  - After patient left Cardiology returned my call and the nurse stated there was no records of his Afib  - Faxed over copy, will notify patient    3) Muscle spasm of the right chest:  - Reproduce with touching  - Restarted on Robaxin 500 mg daily as needed  - Use warm compresses    4)Diabetes type 2 uncontrolled   A1c of 7 6%  -discharged on Basaglar 40 units daily; Novolog ;2010-20 Metformin  - Stoped Victoza in hospital, however restarted today    5) Hypertension  - All medications on HOLD given low Bp  Hold Lisinopril 20 mgs & amlodipine 5mg    Recommend to see pulmonary; cardiology; endocrinology as soon as possible  Patient understood and agreed with plan    RTC in 1 month with shana     Counseling:     · Medication Side Effects: Adverse side effects of medications were reviewed with the patient/guardian today  · I have spent 45 minutes with Patient  today in which greater than 50% of this time was spent in counseling/coordination of care regarding Diagnostic results, Prognosis, Risks and benefits of tx options, Intructions for management, Patient and family education, Importance of tx compliance and Risk factor reductions    · Barriers to treatment include: No identified barriers     Transitional Care Management Review:     Silviano Luis is a 60 y o  male here for TCM follow-up    During the TCM phone call patient stated:    TCM Call (since 11/10/2018)     Date and time call was made  12/6/2018  2:32 PM    Patient was hospitial[facility] at  [facility]    Date of discharge  12/04/18    Diagnosis  Resp failure and pneumonia    Disposition  Home    Were the patients medications reviewed and updated  Yes    Current Symptoms  Shortness of breath; Cough; Fatigue    Cough Severity  Mild    Shortness of breath severity  Mild    Fatigue severity  Mild      TCM Call (since 11/10/2018)     Post hospital issues  None    Should patient be enrolled in anticoag monitoring? No    Scheduled for follow up? Yes    Did you obtain your prescribed medications  Yes    Do you need help managing your prescriptions or medications  No    Is transportation to your appointment needed  Yes    Specify why  Does not drive, friend will bring hime    I have advised the patient to call PCP with any new or worsening symptoms   12/6/18 B  Annie  Friends    Are you recieving any outpatient services  Yes    What type of services  Home PT    Are you recieving home care services  No    Are you using any community resources  No    Current waiver services  No    Have you fallen in the last 12 months  No    Interperter language line needed  No    Counseling  Patient           HPI:   67-year-old male with a significant past medical history of sleep apnea, obesity, Afib hypertension, hyperlipidemia, COPD, diabetes type 2, coronary artery disease presented to the hospital on November 16, 2018 secondary to worsening shortness of breath  The patient was recently admitted in October for COPD exacerbation and was discharged with p o  prednisone    Patient arrived to the hospital on the 16th with shortness of breath was placed on a BiPAP machine at 1st the patient was being admitted for possible DKA given an abnormal anion gap  Was placed on insulin drip for only less than 24 hr   Patient then was diagnosed with a COPD exacerbation and placed on IV steroids later transitioned to nasal cannula and CPAP at bedtime patient responded well the required more long-term treatment and was discharged to rehab  Patient was discharged from the rehab on December 4th  Copd Excerebration:  Patient states that he feels that he is improving since discharge  He stopped his steroids during rehab  Continues to use Advair and rescue use  Currently on nebulizer treatments 3-4 times scheduled  Patient was advised to stop using the nebulizers once 100% controlled  He does have an upcoming appointment with his pulmonologist Oxygen 2L during the day time PRN exertion; CPAP 10  Cough(+) continues to be slightly present  Leading to muscle spasm/right-sided chest discomfort when compressed  States that the pain has been present since October 31st requiring lidocaine/Robaxin for pain relief in rehab  Currently rating his pain 8 to 9/10;stabbing  Diabetes being managed by an endocrinologist   Does not have an upcoming appointment with them  States that his fasting sugars have been quite elevated at 200 to 250  Patient has held  victoxa because was being held at the hospital for an unknown amount of time  The patient states that he has been well controlled on that medication for quite some time  He states that he will contact us endocrinologist so that he can get an appointment sooner  Patient states that he has an arrhythmia that he takes metoprolol 25 mg twice a day he is unsure of the name  He states that he would like to be off the medication if possible but does not know what the arrhythmias called to discuss further today  Denies any chest pain  Does have shortness of breath on exertion however that secondary to his pulmonary disease      Hypertension patient admits to having weight loss to down to 260 lb currently weighing at 290 prior to admission was 310  He states that he was very hypotensive with the weight loss in the hospital therefore has discontinued his lisinopril and amlodipine  Since discharge the patient has had hypotension  Has not seen his cardiologist since discharge  The following portions of the patient's history were reviewed and updated as appropriate: allergies, current medications, past family history, past medical history, past social history, past surgical history and problem list      Review of Systems:     Review of Systems   Constitutional: Positive for fatigue  Negative for activity change, appetite change, chills and fever  HENT: Negative for congestion  Eyes: Negative for visual disturbance  Respiratory: Positive for shortness of breath (On exertion)  Negative for cough and chest tightness  Cardiovascular: Positive for leg swelling  Negative for chest pain  Gastrointestinal: Negative for abdominal distention, abdominal pain, constipation, diarrhea, nausea and vomiting  Musculoskeletal:        Right muscular chest discomfort   Allergic/Immunologic: Negative for environmental allergies  Neurological: Negative for dizziness, light-headedness and headaches  All other systems reviewed and are negative         Problem List:     Patient Active Problem List   Diagnosis    Bipolar affective disorder ([facility] )    Diabetes mellitus type 2, uncontrolled ([facility] )    Fatigue    Psychiatric disorder    SHAVONNE (obstructive sleep apnea)    Morbid obesity ([facility]    CAD (coronary artery disease)    Esophageal reflux    Anal condyloma    Back pain with radiation    Benign essential hypertension    Chronic reflux esophagitis    Diabetic neuropathy ([facility]    Erectile dysfunction of organic origin    Homosexual behavior    Panic disorder without agoraphobia    Vitamin D deficiency    Shortness of breath    Centrilobular emphysema ([facility] )    Lung disease, restrictive    Chronic atrial fibrillation (HCC)        Objective:     /84 (BP Location: Right arm, Patient Position: Sitting, Cuff Size: Large)   Pulse 98   Temp (!) 96 °F (35 6 °C)   Resp 20   Ht 5' 11" (1 803 m)   Wt 132 kg (290 lb)   SpO2 97% Comment: 92 with exerction  BMI 40 45 kg/m²     Physical Exam   Constitutional: He is oriented to person, place, and time  Vital signs are normal  He appears well-developed and well-nourished  HENT:   Head: Normocephalic and atraumatic  Right Ear: Hearing and external ear normal    Left Ear: Hearing and external ear normal    Eyes: Pupils are equal, round, and reactive to light  Conjunctivae and EOM are normal    Neck: Normal range of motion  Neck supple  Cardiovascular: Normal rate, S1 normal, S2 normal, normal heart sounds and intact distal pulses  An irregularly irregular rhythm present  No murmur heard  Pulmonary/Chest: Effort normal and breath sounds normal  No respiratory distress  He has no wheezes  He has no rales  He exhibits tenderness (Chest tenderness over the right wall reproduced)  No abnormalities found on chest exam today   Abdominal: Soft  Bowel sounds are normal  He exhibits no distension  There is no tenderness  Musculoskeletal: Normal range of motion  He exhibits edema  Deformity: trace of edema bilateral lower extremities  Neurological: He is alert and oriented to person, place, and time  He has normal strength  Skin: Skin is warm  No rash noted  Psychiatric: He has a normal mood and affect  His speech is normal and behavior is normal  Judgment and thought content normal    Vitals reviewed  Laboratory Results: I have personally reviewed the pertinent laboratory results/reports     Radiology/Other Diagnostic Testing Results: I have personally reviewed pertinent reports  Xr Chest 1 View Portable    Result Date: 11/16/2018  CHEST INDICATION:   sob    Hemoptysis COMPARISON:  Chest x-ray 11/1/2018, CTA chest 10/31/2018 EXAM PERFORMED/VIEWS:  XR CHEST PORTABLE FINDINGS: Lung volumes are low  No pneumothorax is seen  There is some suspected mild platelike atelectasis in the bilateral lower lung fields  The lungs otherwise appear grossly clear  Prominence of the cardiac and mediastinal contours, as before, probably exaggerated by low lung volumes  The visualized bones appear intact  Low lung volumes  Mild platelike atelectasis suspected in the bilateral lower lung fields but no acute cardiopulmonary disease is seen  No significant interval change  Workstation performed: [id number]     Vas Lower Limb Venous Duplex Study, Complete Bilateral    Result Date: 11/16/2018   [facility] REPORT CLINICAL: Indications: Edema, unspecified [R60 9]  Patient presents with bilateral lower extremity edema x several days  Risk Factors The patient has history of HTN, Diabetes (Yes), HLD and CAD  FINDINGS:  Segment  Right            Left              Impression       Impression       CFV      Normal (Patent)  Normal (Patent)     CONCLUSION:  Impression: RIGHT LOWER LIMB: No evidence of acute or chronic deep vein thrombosis  No evidence of superficial thrombophlebitis noted  Doppler evaluation shows a normal response to augmentation maneuvers  Popliteal, posterior tibial and anterior tibial arterial Doppler waveforms are triphasic  LEFT LOWER LIMB: No evidence of acute or chronic deep vein thrombosis  No evidence of superficial thrombophlebitis noted  Doppler evaluation shows a normal response to augmentation maneuvers  Popliteal, posterior tibial and anterior tibial arterial Doppler waveforms are triphasic    Technical findings given to MARY ANN Hodges 8:50  SIGNATURE: Electronically Signed by: Miguel Somers on 2018-11-16 11:00:31 AM       Current Medications:     Outpatient Medications Prior to Visit   Medication Sig Dispense Refill    ALPRAZolam (XANAX) 2 MG tablet Take 2 mg by mouth daily at bedtime as needed for anxiety      aspirin 81 MG tablet Take 81 mg by mouth every morning        atorvastatin (LIPITOR) 20 mg tablet Take 20 mg by mouth every morning        busPIRone (BUSPAR) 15 mg tablet 15 mg 2 (two) times a day        Cariprazine HCl (VRAYLAR) 4 5 MG CAPS Take 4 5 mg by mouth daily at bedtime      Dapagliflozin Propanediol (FARXIGA) 10 MG TABS Take 10 mg by mouth every morning        EASY TOUCH PEN NEEDLES 31G X 5 MM MISC USE FOUR TIMES A  each 1    ergocalciferol (VITAMIN D2) 50,000 units Take 1 capsule by mouth 2 (two) times a week        fenofibrate (TRIGLIDE) 160 MG tablet Take 160 mg by mouth every morning        fluticasone-umeclidinium-vilanterol (TRELEGY ELLIPTA) 100-62 5-25 MCG/INH inhaler Inhale 1 puff daily Rinse mouth after use   2 Inhaler 0    glucose blood test strip 1 each by Other route as needed Use as instructed      insulin aspart (NovoLOG) 100 units/mL injection Inject 20 Units under the skin 3 (three) times a day before meals      insulin glargine (BASAGLAR KWIKPEN) 100 units/mL injection pen Inject 40 Units under the skin daily at bedtime 5 pen 0    insulin lispro (HumaLOG) 100 units/mL injection Inject 2-12 Units under the skin 4 (four) times a day (before meals and at bedtime)  0    ipratropium-albuterol (DUO-NEB) 0 5-2 5 mg/3 mL nebulizer solution Take 1 vial (3 mL total) by nebulization every 6 (six) hours  0    lidocaine (LIDODERM) 5 % Apply 1 patch topically daily Remove & Discard patch within 12 hours or as directed by MD 30 patch 0    lovastatin (MEVACOR) 40 MG tablet Take 1 tablet (40 mg total) by mouth daily at bedtime 90 tablet 3    metFORMIN (GLUCOPHAGE-XR) 500 mg 24 hr tablet 500 mg 2 (two) times a day with meals    2    metoprolol tartrate (LOPRESSOR) 25 mg tablet Take 1 tablet (25 mg total) by mouth every 12 (twelve) hours 180 tablet 3    mirtazapine (REMERON) 45 MG tablet Take 1 tablet by mouth daily at bedtime        omeprazole (PriLOSEC) 20 mg delayed release capsule Take 1 capsule (20 mg total) by mouth every evening 90 capsule 3    pregabalin (LYRICA) 50 mg capsule Take 1 capsule (50 mg total) by mouth 3 (three) times a day 90 capsule 0    QUEtiapine (SEROquel XR) 400 mg 24 hr tablet Take 400 mg by mouth daily at bedtime        venlafaxine (EFFEXOR XR) 150 mg 24 hr capsule Take 1 capsule by mouth every morning        VENTOLIN  (90 Base) MCG/ACT inhaler INHALE 2 PUFFS 4 (FOUR) TIMES A DAY 18 g 3    benzonatate (TESSALON) 200 MG capsule Take 1 capsule (200 mg total) by mouth 3 (three) times a day (Patient not taking: Reported on 12/11/2018 ) 20 capsule 0    Liraglutide (VICTOZA) 18 MG/3ML SOPN Inject 0 3 mL under the skin daily (Patient not taking: Reported on 12/11/2018 ) 3 pen 0    amLODIPine (NORVASC) 5 mg tablet TAKE ONE TABLET TWICE DAILY (Patient not taking: Reported on 12/11/2018) 180 tablet 3    HYDROcodone-acetaminophen (NORCO) 5-325 mg per tablet Take 1 tablet by mouth 2 (two) times a day as needed for pain (Chest wall pain or severe cough) Max Daily Amount: 2 tablets (Patient not taking: Reported on 12/11/2018 ) 20 tablet 0    lisinopril (ZESTRIL) 20 mg tablet Take 1 tablet (20 mg total) by mouth daily (Patient not taking: Reported on 12/11/2018 ) 90 tablet 3     No facility-administered medications prior to visit          Mya Bashir MD  [address]

## 2018-12-11 NOTE — Clinical Note
Please review  Patient was found in AFIB, according to cardiology office new dx  However seems that the patient is being treated for an arrhthymias that wasn't dx   Awaiting The physician call back to confirm

## 2018-12-12 ENCOUNTER — PATIENT OUTREACH (OUTPATIENT)
Dept: CASE MANAGEMENT | Facility: OTHER | Age: 58
End: 2018-12-12

## 2018-12-13 ENCOUNTER — PATIENT OUTREACH (OUTPATIENT)
Dept: CASE MANAGEMENT | Facility: OTHER | Age: 58
End: 2018-12-13

## 2018-12-19 ENCOUNTER — PATIENT OUTREACH (OUTPATIENT)
Dept: CASE MANAGEMENT | Facility: OTHER | Age: 58
End: 2018-12-19

## 2018-12-19 NOTE — PROGRESS NOTES
Patient continues with SN services with [facility]  He was discharged from PT on 12/12 despite being told he was current on 12/13/18

## 2018-12-20 ENCOUNTER — PATIENT OUTREACH (OUTPATIENT)
Dept: CASE MANAGEMENT | Facility: OTHER | Age: 58
End: 2018-12-20

## 2018-12-21 NOTE — PROGRESS NOTES
Pt doing well since discharge from hospital and rehab  Pt has all medications and has transport with [facility]  Pt has food in home and gets food stamps  Pt has  upcoming appointments 12/13 cardio and pulmonology 12/19  Pt using oxygen continuously and nebulizer 4 times daily  Pt weighs and tracks every day  Watching intake with sodium and fluid    Will continue to follow

## 2018-12-21 NOTE — PROGRESS NOTES
Pt well, has all medications, using oxygen and nebulizer  Has lost over 20lbs since watching sodium intake  Will make referral for MLTSS through [facility]  No other needs at this time   Will continue to follow

## 2018-12-28 ENCOUNTER — PATIENT OUTREACH (OUTPATIENT)
Dept: CASE MANAGEMENT | Facility: OTHER | Age: 58
End: 2018-12-28

## 2018-12-28 DIAGNOSIS — M62.838 MUSCLE SPASM: ICD-10-CM

## 2019-01-03 ENCOUNTER — PATIENT OUTREACH (OUTPATIENT)
Dept: CASE MANAGEMENT | Facility: OTHER | Age: 59
End: 2019-01-03

## 2019-01-04 ENCOUNTER — PATIENT OUTREACH (OUTPATIENT)
Dept: CASE MANAGEMENT | Facility: OTHER | Age: 59
End: 2019-01-04

## 2019-01-04 NOTE — PROGRESS NOTES
Per Sara To, patient is doing well  He has medicare & medicaid  Patient will call medicaid to apply for MLTSS  He has been making his own follow up appointments & scheduling transport with [facility]  He has an appointment with Dr [place] next week & his last visit [facility] will be next week  Advance Directive template given to [facility] for patient

## 2019-01-04 NOTE — PROGRESS NOTES
Patient noted w/a slight cough  States he occasionally has a productive cough with a "little white, light yellow" mucus  He denies chest pain, pressure, or tightness & states his SOB is the same since November hospitalization  Reviewed S&S of COPD exacerbation & infection  He states he will notify PCP immediately if there is a change in symptoms  He continues with [facility], but states they usually just check his vitals  He does feel stronger, but not yet ready to use public transportation  For now he uses [employer] from home for groceries  He reports BS are "pretty good" & he checks them frequently throughout the day via the implanted sensor on his arm  He is interested in filling out an advance directive, will provide to [facility] for the next visit, & a POLST  Will make PCP aware

## 2019-01-08 NOTE — PROGRESS NOTES
Saw pt  Today  Pt feeling well, has food and had just ordered [employer] order  Has all medications and uses nebs, inhalers and oxygen properly  Assisted with MLTSS with insurance and pt will be eligible for [employer] and meal delivery services  Has appt 1/8 and 1/9 with Dr Engel Shed  No other needs   Will continue to assist

## 2019-01-08 NOTE — PROGRESS NOTES
Saw pt today he is feeling well, has all medications  Has food and gets delivery from [employer]  Uses neb when needed and 2 liters of oxygen when needed  Uses inhalers  Has VNA  No other needs at this visit

## 2019-01-11 ENCOUNTER — PATIENT OUTREACH (OUTPATIENT)
Dept: CASE MANAGEMENT | Facility: OTHER | Age: 59
End: 2019-01-11

## 2019-01-14 ENCOUNTER — PATIENT OUTREACH (OUTPATIENT)
Dept: CASE MANAGEMENT | Facility: OTHER | Age: 59
End: 2019-01-14

## 2019-01-15 ENCOUNTER — TELEPHONE (OUTPATIENT)
Dept: FAMILY MEDICINE CLINIC | Facility: CLINIC | Age: 59
End: 2019-01-15

## 2019-01-15 ENCOUNTER — PATIENT OUTREACH (OUTPATIENT)
Dept: CASE MANAGEMENT | Facility: OTHER | Age: 59
End: 2019-01-15

## 2019-01-15 NOTE — TELEPHONE ENCOUNTER
Dr Sen Patient: They are going to be extending care for patient for an additional 4 weeks  Should you have any questions/concerns please contact her

## 2019-01-16 ENCOUNTER — PATIENT OUTREACH (OUTPATIENT)
Dept: CASE MANAGEMENT | Facility: OTHER | Age: 59
End: 2019-01-16

## 2019-01-16 NOTE — PROGRESS NOTES
Saw pt  Today doing well  Has [facility]NA services  Pt has all medication and taking properly  Uses oxygen and nebulizer appropriately  Has food in home and has delivery from [employer]  Set up  from [facility] to evaluate for MLTSS and receive [facility]HA, lifeline, moms meals delivery and transportation services  Gave advanced directive paperwork and patient is going to complete and give copy to family doctor and hospital as well  No other needs   Case now closed

## 2019-01-23 ENCOUNTER — PATIENT OUTREACH (OUTPATIENT)
Dept: CASE MANAGEMENT | Facility: OTHER | Age: 59
End: 2019-01-23

## 2019-01-25 ENCOUNTER — PATIENT OUTREACH (OUTPATIENT)
Dept: CASE MANAGEMENT | Facility: OTHER | Age: 59
End: 2019-01-25

## 2019-01-25 NOTE — PROGRESS NOTES
Patient states he is great  He is very positive & energetic over the phone & is happy with his care & treatments  Occasional productive cough persists w/small amount of clear mucus & mild SOB w/exertion  He denies chest pain or tightness  Patient states he feels stronger & able to do more  He has the advance directive form & states he has no questions & is able to fill out  Reminded him to schedule & go to routine provider appointments to maintain health  He states he will also call them if there is a change in his symptoms  He thinks today is his last appointment with [facility]  He is aware that this is my last call for outreach as his bundle episode is ending

## 2019-01-28 ENCOUNTER — PATIENT OUTREACH (OUTPATIENT)
Dept: CASE MANAGEMENT | Facility: OTHER | Age: 59
End: 2019-01-28

## 2019-02-08 NOTE — TELEPHONE ENCOUNTER
Pt feeling ok , going to diabetes Dr Chilango De Santiago) in [facility] on Monday  Scheduled a follow up appt with you  tc/cma

## 2019-02-18 ENCOUNTER — OFFICE VISIT (OUTPATIENT)
Dept: FAMILY MEDICINE CLINIC | Facility: CLINIC | Age: 59
End: 2019-02-18
Payer: MEDICARE

## 2019-02-18 VITALS
DIASTOLIC BLOOD PRESSURE: 90 MMHG | OXYGEN SATURATION: 97 % | TEMPERATURE: 96.6 F | SYSTOLIC BLOOD PRESSURE: 120 MMHG | BODY MASS INDEX: 39.76 KG/M2 | HEIGHT: 71 IN | RESPIRATION RATE: 20 BRPM | WEIGHT: 284 LBS | HEART RATE: 78 BPM

## 2019-02-18 DIAGNOSIS — E66.01 MORBID OBESITY (HCC): ICD-10-CM

## 2019-02-18 DIAGNOSIS — I48.91 ATRIAL FIBRILLATION, UNSPECIFIED TYPE (HCC): ICD-10-CM

## 2019-02-18 DIAGNOSIS — J43.2 CENTRILOBULAR EMPHYSEMA (HCC): ICD-10-CM

## 2019-02-18 DIAGNOSIS — J40 BRONCHITIS: ICD-10-CM

## 2019-02-18 DIAGNOSIS — E11.65 UNCONTROLLED TYPE 2 DIABETES MELLITUS WITH HYPERGLYCEMIA (HCC): Primary | Chronic | ICD-10-CM

## 2019-02-18 LAB
SL AMB  POCT GLUCOSE, UA: >1000
SL AMB LEUKOCYTE ESTERASE,UA: ABNORMAL
SL AMB POCT BILIRUBIN,UA: ABNORMAL
SL AMB POCT BLOOD,UA: ABNORMAL
SL AMB POCT CLARITY,UA: CLEAR
SL AMB POCT COLOR,UA: YELLOW
SL AMB POCT HEMOGLOBIN AIC: 8.4 (ref ?–6.5)
SL AMB POCT KETONES,UA: ABNORMAL
SL AMB POCT NITRITE,UA: ABNORMAL
SL AMB POCT PH,UA: 7
SL AMB POCT SPECIFIC GRAVITY,UA: 1.01
SL AMB POCT URINE PROTEIN: ABNORMAL
SL AMB POCT UROBILINOGEN: ABNORMAL

## 2019-02-18 PROCEDURE — 81003 URINALYSIS AUTO W/O SCOPE: CPT | Performed by: FAMILY MEDICINE

## 2019-02-18 PROCEDURE — 83036 HEMOGLOBIN GLYCOSYLATED A1C: CPT | Performed by: FAMILY MEDICINE

## 2019-02-18 PROCEDURE — 99214 OFFICE O/P EST MOD 30 MIN: CPT | Performed by: FAMILY MEDICINE

## 2019-02-18 RX ORDER — ERGOCALCIFEROL 1.25 MG/1
50000 CAPSULE ORAL 2 TIMES WEEKLY
Qty: 24 CAPSULE | Refills: 3 | Status: CANCELLED | OUTPATIENT
Start: 2019-02-18

## 2019-02-18 RX ORDER — CEFUROXIME AXETIL 500 MG/1
500 TABLET ORAL EVERY 12 HOURS SCHEDULED
Qty: 14 TABLET | Refills: 0 | Status: SHIPPED | OUTPATIENT
Start: 2019-02-18 | End: 2019-02-25

## 2019-02-18 RX ORDER — APIXABAN 5 MG/1
TABLET, FILM COATED ORAL
Refills: 2 | Status: ON HOLD | COMMUNITY
Start: 2019-02-05 | End: 2019-03-26 | Stop reason: SDUPTHER
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
## 2019-02-25 ENCOUNTER — TELEPHONE (OUTPATIENT)
Dept: PULMONOLOGY | Facility: MEDICAL CENTER | Age: 59
End: 2019-02-25

## 2019-02-25 DIAGNOSIS — J44.1 COPD WITH EXACERBATION (HCC): ICD-10-CM

## 2019-02-25 DIAGNOSIS — R05.9 COUGH: Primary | ICD-10-CM

## 2019-02-25 RX ORDER — BENZONATATE 200 MG/1
200 CAPSULE ORAL 3 TIMES DAILY PRN
Qty: 20 CAPSULE | Refills: 0 | Status: SHIPPED | OUTPATIENT
Start: 2019-02-25 | End: 2019-07-02

## 2019-02-25 NOTE — TELEPHONE ENCOUNTER
Saw dr Shilpa Santos last week-  Thinks he is coming down with bronchitis-  I was able to schedule an appt for him on Monday march 4- can you call him in tessalon pearls to get him through until he can be seen here

## 2019-02-25 NOTE — PROGRESS NOTES
Patient reports having bronchitis  It appears that he 1 is primary care doctor as on Ceftin  He has 2 more days to complete  He does have wheezing but defers any oral prednisone  I have ordered him Tessalon Perles to be taken twice a day as needed  I did inform patient that he if he it does not improved contact PCP or make appointment  He is agreeable

## 2019-03-20 ENCOUNTER — TRANSCRIBE ORDERS (OUTPATIENT)
Dept: ADMINISTRATIVE | Facility: HOSPITAL | Age: 59
End: 2019-03-20

## 2019-03-20 ENCOUNTER — APPOINTMENT (EMERGENCY)
Dept: RADIOLOGY | Facility: HOSPITAL | Age: 59
DRG: 189 | End: 2019-03-20
Payer: MEDICARE

## 2019-03-20 ENCOUNTER — HOSPITAL ENCOUNTER (OUTPATIENT)
Dept: RADIOLOGY | Facility: HOSPITAL | Age: 59
Discharge: HOME | End: 2019-03-20

## 2019-03-20 ENCOUNTER — HOSPITAL ENCOUNTER (INPATIENT)
Facility: HOSPITAL | Age: 59
LOS: 6 days | Discharge: HOME | DRG: 189 | End: 2019-03-26
Attending: EMERGENCY MEDICINE | Admitting: INTERNAL MEDICINE
Payer: MEDICARE

## 2019-03-20 DIAGNOSIS — J44.1 COPD EXACERBATION (HCC): Primary | ICD-10-CM

## 2019-03-20 DIAGNOSIS — J42 CHRONIC BRONCHITIS WITH ACUTE EXACERBATION (HCC): ICD-10-CM

## 2019-03-20 DIAGNOSIS — R09.89 ABNORMAL CHEST SOUNDS: ICD-10-CM

## 2019-03-20 DIAGNOSIS — E11.65 UNCONTROLLED TYPE 2 DIABETES MELLITUS WITH HYPERGLYCEMIA (HCC): Chronic | ICD-10-CM

## 2019-03-20 DIAGNOSIS — R05.9 COUGH: ICD-10-CM

## 2019-03-20 DIAGNOSIS — J20.9 CHRONIC BRONCHITIS WITH ACUTE EXACERBATION (HCC): ICD-10-CM

## 2019-03-20 DIAGNOSIS — I48.91 ATRIAL FIBRILLATION WITH RVR (HCC): ICD-10-CM

## 2019-03-20 DIAGNOSIS — R05.9 COUGH: Primary | ICD-10-CM

## 2019-03-20 LAB
ALBUMIN SERPL BCP-MCNC: 3.7 G/DL (ref 3.5–5)
ALP SERPL-CCNC: 72 U/L (ref 46–116)
ALT SERPL W P-5'-P-CCNC: 35 U/L (ref 12–78)
ANION GAP SERPL CALCULATED.3IONS-SCNC: 8 MMOL/L (ref 4–13)
AST SERPL W P-5'-P-CCNC: 14 U/L (ref 5–45)
BASOPHILS # BLD AUTO: 0.03 THOUSANDS/ΜL (ref 0–0.1)
BASOPHILS NFR BLD AUTO: 0 % (ref 0–1)
BILIRUB SERPL-MCNC: 0.3 MG/DL (ref 0.2–1)
BUN SERPL-MCNC: 18 MG/DL (ref 5–25)
CALCIUM SERPL-MCNC: 9.4 MG/DL (ref 8.3–10.1)
CHLORIDE SERPL-SCNC: 101 MMOL/L (ref 100–108)
CO2 SERPL-SCNC: 29 MMOL/L (ref 21–32)
CREAT SERPL-MCNC: 1.03 MG/DL (ref 0.6–1.3)
EOSINOPHIL # BLD AUTO: 0.13 THOUSAND/ΜL (ref 0–0.61)
EOSINOPHIL NFR BLD AUTO: 1 % (ref 0–6)
ERYTHROCYTE [DISTWIDTH] IN BLOOD BY AUTOMATED COUNT: 12.9 % (ref 11.6–15.1)
GFR SERPL CREATININE-BSD FRML MDRD: 79 ML/MIN/1.73SQ M
GLUCOSE SERPL-MCNC: 138 MG/DL (ref 65–140)
GLUCOSE SERPL-MCNC: 315 MG/DL (ref 65–140)
HCT VFR BLD AUTO: 44.2 % (ref 36.5–49.3)
HGB BLD-MCNC: 14.7 G/DL (ref 12–17)
IMM GRANULOCYTES # BLD AUTO: 0.12 THOUSAND/UL (ref 0–0.2)
IMM GRANULOCYTES NFR BLD AUTO: 1 % (ref 0–2)
LYMPHOCYTES # BLD AUTO: 2.84 THOUSANDS/ΜL (ref 0.6–4.47)
LYMPHOCYTES NFR BLD AUTO: 29 % (ref 14–44)
MCH RBC QN AUTO: 30.2 PG (ref 26.8–34.3)
MCHC RBC AUTO-ENTMCNC: 33.3 G/DL (ref 31.4–37.4)
MCV RBC AUTO: 91 FL (ref 82–98)
MONOCYTES # BLD AUTO: 0.76 THOUSAND/ΜL (ref 0.17–1.22)
MONOCYTES NFR BLD AUTO: 8 % (ref 4–12)
NEUTROPHILS # BLD AUTO: 5.95 THOUSANDS/ΜL (ref 1.85–7.62)
NEUTS SEG NFR BLD AUTO: 61 % (ref 43–75)
NRBC BLD AUTO-RTO: 0 /100 WBCS
NT-PROBNP SERPL-MCNC: 855 PG/ML
PLATELET # BLD AUTO: 198 THOUSANDS/UL (ref 149–390)
PMV BLD AUTO: 9.9 FL (ref 8.9–12.7)
POTASSIUM SERPL-SCNC: 4 MMOL/L (ref 3.5–5.3)
PROCALCITONIN SERPL-MCNC: <0.05 NG/ML
PROT SERPL-MCNC: 7.1 G/DL (ref 6.4–8.2)
RBC # BLD AUTO: 4.87 MILLION/UL (ref 3.88–5.62)
SODIUM SERPL-SCNC: 138 MMOL/L (ref 136–145)
TROPONIN I SERPL-MCNC: 0.02 NG/ML
TROPONIN I SERPL-MCNC: 0.03 NG/ML
WBC # BLD AUTO: 9.83 THOUSAND/UL (ref 4.31–10.16)

## 2019-03-20 PROCEDURE — 96375 TX/PRO/DX INJ NEW DRUG ADDON: CPT

## 2019-03-20 PROCEDURE — 99285 EMERGENCY DEPT VISIT HI MDM: CPT

## 2019-03-20 PROCEDURE — 83880 ASSAY OF NATRIURETIC PEPTIDE: CPT | Performed by: PHYSICIAN ASSISTANT

## 2019-03-20 PROCEDURE — 99223 1ST HOSP IP/OBS HIGH 75: CPT | Performed by: INTERNAL MEDICINE

## 2019-03-20 PROCEDURE — 94760 N-INVAS EAR/PLS OXIMETRY 1: CPT

## 2019-03-20 PROCEDURE — 87081 CULTURE SCREEN ONLY: CPT | Performed by: INTERNAL MEDICINE

## 2019-03-20 PROCEDURE — 84484 ASSAY OF TROPONIN QUANT: CPT | Performed by: PHYSICIAN ASSISTANT

## 2019-03-20 PROCEDURE — 94640 AIRWAY INHALATION TREATMENT: CPT

## 2019-03-20 PROCEDURE — 82948 REAGENT STRIP/BLOOD GLUCOSE: CPT

## 2019-03-20 PROCEDURE — 80053 COMPREHEN METABOLIC PANEL: CPT | Performed by: PHYSICIAN ASSISTANT

## 2019-03-20 PROCEDURE — 93005 ELECTROCARDIOGRAM TRACING: CPT

## 2019-03-20 PROCEDURE — 96374 THER/PROPH/DIAG INJ IV PUSH: CPT

## 2019-03-20 PROCEDURE — 85025 COMPLETE CBC W/AUTO DIFF WBC: CPT | Performed by: PHYSICIAN ASSISTANT

## 2019-03-20 PROCEDURE — 71045 X-RAY EXAM CHEST 1 VIEW: CPT

## 2019-03-20 PROCEDURE — 84145 PROCALCITONIN (PCT): CPT | Performed by: NURSE PRACTITIONER

## 2019-03-20 PROCEDURE — 36415 COLL VENOUS BLD VENIPUNCTURE: CPT | Performed by: PHYSICIAN ASSISTANT

## 2019-03-20 PROCEDURE — 94660 CPAP INITIATION&MGMT: CPT

## 2019-03-20 RX ORDER — METHYLPREDNISOLONE SODIUM SUCCINATE 125 MG/2ML
80 INJECTION, POWDER, LYOPHILIZED, FOR SOLUTION INTRAMUSCULAR; INTRAVENOUS ONCE
Status: COMPLETED | OUTPATIENT
Start: 2019-03-20 | End: 2019-03-20

## 2019-03-20 RX ORDER — FENOFIBRATE 160 MG/1
160 TABLET ORAL DAILY
Status: DISCONTINUED | OUTPATIENT
Start: 2019-03-21 | End: 2019-03-26 | Stop reason: HOSPADM

## 2019-03-20 RX ORDER — ALBUTEROL SULFATE 2.5 MG/3ML
5 SOLUTION RESPIRATORY (INHALATION) ONCE
Status: COMPLETED | OUTPATIENT
Start: 2019-03-20 | End: 2019-03-20

## 2019-03-20 RX ORDER — METHOCARBAMOL 500 MG/1
500 TABLET, FILM COATED ORAL
Status: DISCONTINUED | OUTPATIENT
Start: 2019-03-20 | End: 2019-03-26 | Stop reason: HOSPADM

## 2019-03-20 RX ORDER — LEVALBUTEROL INHALATION SOLUTION 0.63 MG/3ML
0.63 SOLUTION RESPIRATORY (INHALATION) ONCE
Status: COMPLETED | OUTPATIENT
Start: 2019-03-20 | End: 2019-03-20

## 2019-03-20 RX ORDER — LEVALBUTEROL 1.25 MG/.5ML
1.25 SOLUTION, CONCENTRATE RESPIRATORY (INHALATION)
Status: DISCONTINUED | OUTPATIENT
Start: 2019-03-20 | End: 2019-03-26 | Stop reason: HOSPADM

## 2019-03-20 RX ORDER — KETOROLAC TROMETHAMINE 30 MG/ML
15 INJECTION, SOLUTION INTRAMUSCULAR; INTRAVENOUS ONCE
Status: COMPLETED | OUTPATIENT
Start: 2019-03-20 | End: 2019-03-20

## 2019-03-20 RX ORDER — FENOFIBRATE 48 MG/1
160 TABLET, COATED ORAL DAILY
Status: DISCONTINUED | OUTPATIENT
Start: 2019-03-21 | End: 2019-03-20

## 2019-03-20 RX ORDER — METOPROLOL TARTRATE 5 MG/5ML
5 INJECTION INTRAVENOUS ONCE
Status: COMPLETED | OUTPATIENT
Start: 2019-03-20 | End: 2019-03-20

## 2019-03-20 RX ORDER — ALPRAZOLAM 0.5 MG/1
2 TABLET ORAL
Status: DISCONTINUED | OUTPATIENT
Start: 2019-03-20 | End: 2019-03-26 | Stop reason: HOSPADM

## 2019-03-20 RX ORDER — ASPIRIN 81 MG/1
81 TABLET, CHEWABLE ORAL EVERY MORNING
Status: DISCONTINUED | OUTPATIENT
Start: 2019-03-21 | End: 2019-03-26 | Stop reason: HOSPADM

## 2019-03-20 RX ORDER — AZITHROMYCIN 250 MG/1
500 TABLET, FILM COATED ORAL EVERY 24 HOURS
Status: DISCONTINUED | OUTPATIENT
Start: 2019-03-20 | End: 2019-03-25

## 2019-03-20 RX ORDER — PANTOPRAZOLE SODIUM 40 MG/1
40 TABLET, DELAYED RELEASE ORAL
Status: DISCONTINUED | OUTPATIENT
Start: 2019-03-21 | End: 2019-03-26 | Stop reason: HOSPADM

## 2019-03-20 RX ORDER — METHYLPREDNISOLONE SODIUM SUCCINATE 40 MG/ML
40 INJECTION, POWDER, LYOPHILIZED, FOR SOLUTION INTRAMUSCULAR; INTRAVENOUS EVERY 8 HOURS SCHEDULED
Status: DISCONTINUED | OUTPATIENT
Start: 2019-03-20 | End: 2019-03-22

## 2019-03-20 RX ORDER — DILTIAZEM HYDROCHLORIDE 5 MG/ML
15 INJECTION INTRAVENOUS ONCE
Status: COMPLETED | OUTPATIENT
Start: 2019-03-20 | End: 2019-03-20

## 2019-03-20 RX ORDER — QUETIAPINE 200 MG/1
400 TABLET, FILM COATED, EXTENDED RELEASE ORAL
Status: DISCONTINUED | OUTPATIENT
Start: 2019-03-20 | End: 2019-03-26 | Stop reason: HOSPADM

## 2019-03-20 RX ORDER — VENLAFAXINE HYDROCHLORIDE 150 MG/1
150 CAPSULE, EXTENDED RELEASE ORAL EVERY MORNING
Status: DISCONTINUED | OUTPATIENT
Start: 2019-03-21 | End: 2019-03-26 | Stop reason: HOSPADM

## 2019-03-20 RX ORDER — LORAZEPAM 2 MG/ML
0.5 INJECTION INTRAMUSCULAR ONCE
Status: COMPLETED | OUTPATIENT
Start: 2019-03-20 | End: 2019-03-20

## 2019-03-20 RX ORDER — METOPROLOL TARTRATE 50 MG/1
50 TABLET, FILM COATED ORAL EVERY 12 HOURS SCHEDULED
Status: DISCONTINUED | OUTPATIENT
Start: 2019-03-20 | End: 2019-03-21

## 2019-03-20 RX ORDER — GUAIFENESIN/DEXTROMETHORPHAN 100-10MG/5
10 SYRUP ORAL ONCE
Status: COMPLETED | OUTPATIENT
Start: 2019-03-20 | End: 2019-03-20

## 2019-03-20 RX ORDER — INSULIN GLARGINE 100 [IU]/ML
45 INJECTION, SOLUTION SUBCUTANEOUS
Status: DISCONTINUED | OUTPATIENT
Start: 2019-03-20 | End: 2019-03-23

## 2019-03-20 RX ORDER — PREGABALIN 50 MG/1
50 CAPSULE ORAL 3 TIMES DAILY
Status: DISCONTINUED | OUTPATIENT
Start: 2019-03-20 | End: 2019-03-26 | Stop reason: HOSPADM

## 2019-03-20 RX ORDER — BENZONATATE 100 MG/1
200 CAPSULE ORAL 3 TIMES DAILY PRN
Status: DISCONTINUED | OUTPATIENT
Start: 2019-03-20 | End: 2019-03-26 | Stop reason: HOSPADM

## 2019-03-20 RX ORDER — FENOFIBRATE 48 MG/1
160 TABLET, COATED ORAL EVERY MORNING
Status: DISCONTINUED | OUTPATIENT
Start: 2019-03-21 | End: 2019-03-20

## 2019-03-20 RX ADMIN — IPRATROPIUM BROMIDE 0.5 MG: 0.5 SOLUTION RESPIRATORY (INHALATION) at 17:38

## 2019-03-20 RX ADMIN — GUAIFENESIN AND DEXTROMETHORPHAN 10 ML: 100; 10 SYRUP ORAL at 14:51

## 2019-03-20 RX ADMIN — LORAZEPAM 0.5 MG: 2 INJECTION INTRAMUSCULAR; INTRAVENOUS at 16:23

## 2019-03-20 RX ADMIN — LEVALBUTEROL HYDROCHLORIDE 0.63 MG: 0.63 SOLUTION RESPIRATORY (INHALATION) at 16:01

## 2019-03-20 RX ADMIN — BUSPIRONE HYDROCHLORIDE 15 MG: 10 TABLET ORAL at 20:08

## 2019-03-20 RX ADMIN — INSULIN GLARGINE 45 UNITS: 100 INJECTION, SOLUTION SUBCUTANEOUS at 22:17

## 2019-03-20 RX ADMIN — IPRATROPIUM BROMIDE 0.5 MG: 0.5 SOLUTION RESPIRATORY (INHALATION) at 19:20

## 2019-03-20 RX ADMIN — LEVALBUTEROL HYDROCHLORIDE 0.63 MG: 0.63 SOLUTION RESPIRATORY (INHALATION) at 16:23

## 2019-03-20 RX ADMIN — KETOROLAC TROMETHAMINE 15 MG: 30 INJECTION, SOLUTION INTRAMUSCULAR at 15:34

## 2019-03-20 RX ADMIN — ALBUTEROL SULFATE 5 MG: 2.5 SOLUTION RESPIRATORY (INHALATION) at 14:37

## 2019-03-20 RX ADMIN — APIXABAN 5 MG: 5 TABLET, FILM COATED ORAL at 20:08

## 2019-03-20 RX ADMIN — DILTIAZEM HYDROCHLORIDE 15 MG: 5 INJECTION INTRAVENOUS at 20:08

## 2019-03-20 RX ADMIN — METOPROLOL TARTRATE 5 MG: 5 INJECTION, SOLUTION INTRAVENOUS at 18:43

## 2019-03-20 RX ADMIN — LEVALBUTEROL HYDROCHLORIDE 1.25 MG: 1.25 SOLUTION, CONCENTRATE RESPIRATORY (INHALATION) at 17:38

## 2019-03-20 RX ADMIN — ALPRAZOLAM 2 MG: 0.5 TABLET ORAL at 22:12

## 2019-03-20 RX ADMIN — METHYLPREDNISOLONE SODIUM SUCCINATE 80 MG: 125 INJECTION, POWDER, FOR SOLUTION INTRAMUSCULAR; INTRAVENOUS at 14:49

## 2019-03-20 RX ADMIN — PREGABALIN 50 MG: 50 CAPSULE ORAL at 20:50

## 2019-03-20 RX ADMIN — LEVALBUTEROL HYDROCHLORIDE 0.63 MG: 0.63 SOLUTION RESPIRATORY (INHALATION) at 16:22

## 2019-03-20 RX ADMIN — INSULIN LISPRO 12 UNITS: 100 INJECTION, SOLUTION INTRAVENOUS; SUBCUTANEOUS at 22:59

## 2019-03-20 RX ADMIN — IPRATROPIUM BROMIDE 0.5 MG: 0.5 SOLUTION RESPIRATORY (INHALATION) at 14:37

## 2019-03-20 RX ADMIN — METHYLPREDNISOLONE SODIUM SUCCINATE 40 MG: 40 INJECTION, POWDER, FOR SOLUTION INTRAMUSCULAR; INTRAVENOUS at 22:59

## 2019-03-20 RX ADMIN — QUETIAPINE FUMARATE 400 MG: 200 TABLET, EXTENDED RELEASE ORAL at 22:12

## 2019-03-20 RX ADMIN — IPRATROPIUM BROMIDE 0.5 MG: 0.5 SOLUTION RESPIRATORY (INHALATION) at 16:02

## 2019-03-20 RX ADMIN — AZITHROMYCIN MONOHYDRATE 500 MG: 250 TABLET ORAL at 17:37

## 2019-03-20 RX ADMIN — LEVALBUTEROL HYDROCHLORIDE 1.25 MG: 1.25 SOLUTION, CONCENTRATE RESPIRATORY (INHALATION) at 19:21

## 2019-03-20 RX ADMIN — METOPROLOL TARTRATE 50 MG: 50 TABLET, FILM COATED ORAL at 20:50

## 2019-03-20 NOTE — ASSESSMENT & PLAN NOTE
· Secondary to acute on chronic bronchitis exacerbation  · Continue nasal cannula  · Continue HS bipap  · Continue Zithromax, steroids, and duo nebs

## 2019-03-20 NOTE — ASSESSMENT & PLAN NOTE
Lab Results   Component Value Date    HGBA1C 8 4 (A) 02/18/2019       Recent Labs     03/20/19  2218 03/21/19  1120 03/21/19  1618   POCGLU 315* 285* 327*       Blood Sugar Average: Last 72 hrs:  (P) 309 continue Lantus 45 units at night   Currently on algorithm 5  Blood sugars remain greater than 180  Hyperglycemia likely related to steroid use  Add 10 units of Humalog with meals  Consider Deescalating after steroids discontinued

## 2019-03-20 NOTE — ASSESSMENT & PLAN NOTE
· Possibly triggered by viral illness  · Procalcitonin negative  · Continue Zithromax, steroids, and breathing treatments  ·

## 2019-03-20 NOTE — ASSESSMENT & PLAN NOTE
· Normally wear CPAP with 2 L at night    Patient states he is more comfortable on BiPAP  · Continue BiPAP HS

## 2019-03-20 NOTE — ED NOTES
Patient's roommate Jay Funez updated to patient's status as requested by patient  As per patient's friend Blanka Garciar "He drinks like 6 liters a day of diet coke and doesn't control his diet in any way   I'm really concerned about his health because he just doesn't take care of himself "      Hanna Barreto RN  03/20/19 [id number]

## 2019-03-20 NOTE — ASSESSMENT & PLAN NOTE
· Likely exacerbated by COPD exacerbation  · Continue oral beta-blocker- increased to 75 mg metoprolol b i d   · P r n   Metoprolol 5 mg for heart rate greater than 120  · Continue Eliquis
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
## 2019-03-21 ENCOUNTER — APPOINTMENT (INPATIENT)
Dept: RADIOLOGY | Facility: HOSPITAL | Age: 59
DRG: 189 | End: 2019-03-21
Payer: MEDICARE

## 2019-03-21 LAB
ANION GAP SERPL CALCULATED.3IONS-SCNC: 10 MMOL/L (ref 4–13)
BACTERIA SPT RESP CULT: ABNORMAL
BASOPHILS # BLD AUTO: 0.02 THOUSANDS/ΜL (ref 0–0.1)
BASOPHILS NFR BLD AUTO: 0 % (ref 0–1)
BUN SERPL-MCNC: 29 MG/DL (ref 5–25)
CALCIUM SERPL-MCNC: 9.4 MG/DL (ref 8.3–10.1)
CHLORIDE SERPL-SCNC: 102 MMOL/L (ref 100–108)
CO2 SERPL-SCNC: 26 MMOL/L (ref 21–32)
CREAT SERPL-MCNC: 1.02 MG/DL (ref 0.6–1.3)
EOSINOPHIL # BLD AUTO: 0 THOUSAND/ΜL (ref 0–0.61)
EOSINOPHIL NFR BLD AUTO: 0 % (ref 0–6)
ERYTHROCYTE [DISTWIDTH] IN BLOOD BY AUTOMATED COUNT: 13 % (ref 11.6–15.1)
GFR SERPL CREATININE-BSD FRML MDRD: 80 ML/MIN/1.73SQ M
GLUCOSE SERPL-MCNC: 239 MG/DL (ref 65–140)
GLUCOSE SERPL-MCNC: 285 MG/DL (ref 65–140)
GLUCOSE SERPL-MCNC: 306 MG/DL (ref 65–140)
GLUCOSE SERPL-MCNC: 314 MG/DL (ref 65–140)
GLUCOSE SERPL-MCNC: 327 MG/DL (ref 65–140)
GRAM STN SPEC: ABNORMAL
HCT VFR BLD AUTO: 43.9 % (ref 36.5–49.3)
HGB BLD-MCNC: 14.1 G/DL (ref 12–17)
IMM GRANULOCYTES # BLD AUTO: 0.06 THOUSAND/UL (ref 0–0.2)
IMM GRANULOCYTES NFR BLD AUTO: 1 % (ref 0–2)
L PNEUMO1 AG UR QL IA.RAPID: NEGATIVE
LYMPHOCYTES # BLD AUTO: 2.55 THOUSANDS/ΜL (ref 0.6–4.47)
LYMPHOCYTES NFR BLD AUTO: 23 % (ref 14–44)
MAGNESIUM SERPL-MCNC: 1.8 MG/DL (ref 1.6–2.6)
MCH RBC QN AUTO: 29.9 PG (ref 26.8–34.3)
MCHC RBC AUTO-ENTMCNC: 32.1 G/DL (ref 31.4–37.4)
MCV RBC AUTO: 93 FL (ref 82–98)
MONOCYTES # BLD AUTO: 0.3 THOUSAND/ΜL (ref 0.17–1.22)
MONOCYTES NFR BLD AUTO: 3 % (ref 4–12)
NEUTROPHILS # BLD AUTO: 8.34 THOUSANDS/ΜL (ref 1.85–7.62)
NEUTS SEG NFR BLD AUTO: 73 % (ref 43–75)
NRBC BLD AUTO-RTO: 0 /100 WBCS
PHOSPHATE SERPL-MCNC: 3.5 MG/DL (ref 2.7–4.5)
PLATELET # BLD AUTO: 199 THOUSANDS/UL (ref 149–390)
PMV BLD AUTO: 10.1 FL (ref 8.9–12.7)
POTASSIUM SERPL-SCNC: 4.6 MMOL/L (ref 3.5–5.3)
RBC # BLD AUTO: 4.72 MILLION/UL (ref 3.88–5.62)
S PNEUM AG UR QL: NEGATIVE
SODIUM SERPL-SCNC: 138 MMOL/L (ref 136–145)
WBC # BLD AUTO: 11.27 THOUSAND/UL (ref 4.31–10.16)

## 2019-03-21 PROCEDURE — 94660 CPAP INITIATION&MGMT: CPT

## 2019-03-21 PROCEDURE — 87205 SMEAR GRAM STAIN: CPT | Performed by: NURSE PRACTITIONER

## 2019-03-21 PROCEDURE — 85025 COMPLETE CBC W/AUTO DIFF WBC: CPT | Performed by: NURSE PRACTITIONER

## 2019-03-21 PROCEDURE — 99232 SBSQ HOSP IP/OBS MODERATE 35: CPT | Performed by: INTERNAL MEDICINE

## 2019-03-21 PROCEDURE — 84100 ASSAY OF PHOSPHORUS: CPT | Performed by: NURSE PRACTITIONER

## 2019-03-21 PROCEDURE — 83735 ASSAY OF MAGNESIUM: CPT | Performed by: NURSE PRACTITIONER

## 2019-03-21 PROCEDURE — 94760 N-INVAS EAR/PLS OXIMETRY 1: CPT

## 2019-03-21 PROCEDURE — 80048 BASIC METABOLIC PNL TOTAL CA: CPT | Performed by: NURSE PRACTITIONER

## 2019-03-21 PROCEDURE — 87449 NOS EACH ORGANISM AG IA: CPT | Performed by: NURSE PRACTITIONER

## 2019-03-21 PROCEDURE — 82948 REAGENT STRIP/BLOOD GLUCOSE: CPT

## 2019-03-21 PROCEDURE — 94640 AIRWAY INHALATION TREATMENT: CPT

## 2019-03-21 PROCEDURE — 71045 X-RAY EXAM CHEST 1 VIEW: CPT

## 2019-03-21 RX ORDER — FUROSEMIDE 10 MG/ML
40 INJECTION INTRAMUSCULAR; INTRAVENOUS ONCE
Status: COMPLETED | OUTPATIENT
Start: 2019-03-21 | End: 2019-03-21

## 2019-03-21 RX ORDER — METOPROLOL TARTRATE 50 MG/1
100 TABLET, FILM COATED ORAL EVERY 12 HOURS SCHEDULED
Status: DISCONTINUED | OUTPATIENT
Start: 2019-03-21 | End: 2019-03-21

## 2019-03-21 RX ORDER — METOPROLOL TARTRATE 5 MG/5ML
5 INJECTION INTRAVENOUS EVERY 6 HOURS PRN
Status: DISCONTINUED | OUTPATIENT
Start: 2019-03-21 | End: 2019-03-26 | Stop reason: HOSPADM

## 2019-03-21 RX ORDER — LEVALBUTEROL 1.25 MG/.5ML
SOLUTION, CONCENTRATE RESPIRATORY (INHALATION)
Status: DISCONTINUED
Start: 2019-03-21 | End: 2019-03-21 | Stop reason: WASHOUT

## 2019-03-21 RX ADMIN — BUSPIRONE HYDROCHLORIDE 15 MG: 10 TABLET ORAL at 17:10

## 2019-03-21 RX ADMIN — METOPROLOL TARTRATE 5 MG: 5 INJECTION, SOLUTION INTRAVENOUS at 16:02

## 2019-03-21 RX ADMIN — LEVALBUTEROL HYDROCHLORIDE 1.25 MG: 1.25 SOLUTION, CONCENTRATE RESPIRATORY (INHALATION) at 11:16

## 2019-03-21 RX ADMIN — AZITHROMYCIN MONOHYDRATE 500 MG: 250 TABLET ORAL at 17:09

## 2019-03-21 RX ADMIN — ALPRAZOLAM 2 MG: 0.5 TABLET ORAL at 22:18

## 2019-03-21 RX ADMIN — METOPROLOL TARTRATE 5 MG: 5 INJECTION, SOLUTION INTRAVENOUS at 18:47

## 2019-03-21 RX ADMIN — METHYLPREDNISOLONE SODIUM SUCCINATE 40 MG: 40 INJECTION, POWDER, FOR SOLUTION INTRAMUSCULAR; INTRAVENOUS at 06:14

## 2019-03-21 RX ADMIN — PREGABALIN 50 MG: 50 CAPSULE ORAL at 22:02

## 2019-03-21 RX ADMIN — INSULIN LISPRO 6 UNITS: 100 INJECTION, SOLUTION INTRAVENOUS; SUBCUTANEOUS at 18:20

## 2019-03-21 RX ADMIN — LEVALBUTEROL HYDROCHLORIDE 1.25 MG: 1.25 SOLUTION, CONCENTRATE RESPIRATORY (INHALATION) at 16:09

## 2019-03-21 RX ADMIN — METOPROLOL TARTRATE 50 MG: 50 TABLET, FILM COATED ORAL at 08:04

## 2019-03-21 RX ADMIN — INSULIN LISPRO 12 UNITS: 100 INJECTION, SOLUTION INTRAVENOUS; SUBCUTANEOUS at 16:54

## 2019-03-21 RX ADMIN — IPRATROPIUM BROMIDE 0.5 MG: 0.5 SOLUTION RESPIRATORY (INHALATION) at 07:12

## 2019-03-21 RX ADMIN — PREGABALIN 50 MG: 50 CAPSULE ORAL at 08:04

## 2019-03-21 RX ADMIN — ASPIRIN 81 MG 81 MG: 81 TABLET ORAL at 08:04

## 2019-03-21 RX ADMIN — APIXABAN 5 MG: 5 TABLET, FILM COATED ORAL at 08:04

## 2019-03-21 RX ADMIN — METOPROLOL TARTRATE 75 MG: 50 TABLET, FILM COATED ORAL at 22:02

## 2019-03-21 RX ADMIN — LEVALBUTEROL HYDROCHLORIDE 1.25 MG: 1.25 SOLUTION, CONCENTRATE RESPIRATORY (INHALATION) at 20:10

## 2019-03-21 RX ADMIN — QUETIAPINE FUMARATE 400 MG: 200 TABLET, EXTENDED RELEASE ORAL at 22:18

## 2019-03-21 RX ADMIN — IPRATROPIUM BROMIDE 0.5 MG: 0.5 SOLUTION RESPIRATORY (INHALATION) at 11:16

## 2019-03-21 RX ADMIN — VENLAFAXINE HYDROCHLORIDE 150 MG: 150 CAPSULE, EXTENDED RELEASE ORAL at 08:04

## 2019-03-21 RX ADMIN — IPRATROPIUM BROMIDE 0.5 MG: 0.5 SOLUTION RESPIRATORY (INHALATION) at 16:09

## 2019-03-21 RX ADMIN — FUROSEMIDE 40 MG: 10 INJECTION, SOLUTION INTRAMUSCULAR; INTRAVENOUS at 18:21

## 2019-03-21 RX ADMIN — METHOCARBAMOL 500 MG: 500 TABLET ORAL at 16:57

## 2019-03-21 RX ADMIN — PREGABALIN 50 MG: 50 CAPSULE ORAL at 16:52

## 2019-03-21 RX ADMIN — METHYLPREDNISOLONE SODIUM SUCCINATE 40 MG: 40 INJECTION, POWDER, FOR SOLUTION INTRAMUSCULAR; INTRAVENOUS at 13:17

## 2019-03-21 RX ADMIN — PANTOPRAZOLE SODIUM 40 MG: 40 TABLET, DELAYED RELEASE ORAL at 06:14

## 2019-03-21 RX ADMIN — APIXABAN 5 MG: 5 TABLET, FILM COATED ORAL at 17:10

## 2019-03-21 RX ADMIN — BUSPIRONE HYDROCHLORIDE 15 MG: 10 TABLET ORAL at 08:04

## 2019-03-21 RX ADMIN — INSULIN GLARGINE 45 UNITS: 100 INJECTION, SOLUTION SUBCUTANEOUS at 22:18

## 2019-03-21 RX ADMIN — INSULIN LISPRO 12 UNITS: 100 INJECTION, SOLUTION INTRAVENOUS; SUBCUTANEOUS at 11:45

## 2019-03-21 RX ADMIN — INSULIN LISPRO 12 UNITS: 100 INJECTION, SOLUTION INTRAVENOUS; SUBCUTANEOUS at 22:20

## 2019-03-21 RX ADMIN — BENZONATATE 200 MG: 100 CAPSULE ORAL at 16:58

## 2019-03-21 RX ADMIN — INSULIN LISPRO 8 UNITS: 100 INJECTION, SOLUTION INTRAVENOUS; SUBCUTANEOUS at 06:35

## 2019-03-21 RX ADMIN — LEVALBUTEROL HYDROCHLORIDE 1.25 MG: 1.25 SOLUTION, CONCENTRATE RESPIRATORY (INHALATION) at 07:12

## 2019-03-21 RX ADMIN — METHYLPREDNISOLONE SODIUM SUCCINATE 40 MG: 40 INJECTION, POWDER, FOR SOLUTION INTRAMUSCULAR; INTRAVENOUS at 22:18

## 2019-03-21 RX ADMIN — IPRATROPIUM BROMIDE 0.5 MG: 0.5 SOLUTION RESPIRATORY (INHALATION) at 20:10
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
## 2019-03-21 NOTE — PLAN OF CARE
Problem: RESPIRATORY - ADULT  Goal: Achieves optimal ventilation and oxygenation  Description  INTERVENTIONS:  - Assess for changes in respiratory status  - Assess for changes in mentation and behavior  - Position to facilitate oxygenation and minimize respiratory effort  - Oxygen administration by appropriate delivery method based on oxygen saturation (per order) or ABGs  - Initiate smoking cessation education as indicated  - Encourage broncho-pulmonary hygiene including cough, deep breathe, Incentive Spirometry  - Assess the need for suctioning and aspirate as needed  - Assess and instruct to report SOB or any respiratory difficulty  - Respiratory Therapy support as indicated  Outcome: Progressing     Problem: PAIN - ADULT  Goal: Verbalizes/displays adequate comfort level or baseline comfort level  Description  Interventions:  - Encourage patient to monitor pain and request assistance  - Assess pain using appropriate pain scale  - Administer analgesics based on type and severity of pain and evaluate response  - Implement non-pharmacological measures as appropriate and evaluate response  - Consider cultural and social influences on pain and pain management  - Notify physician/advanced practitioner if interventions unsuccessful or patient reports new pain  Outcome: Progressing     Problem: INFECTION - ADULT  Goal: Absence or prevention of progression during hospitalization  Description  INTERVENTIONS:  - Assess and monitor for signs and symptoms of infection  - Monitor lab/diagnostic results  - Monitor all insertion sites, i e  indwelling lines, tubes, and drains  - Monitor endotracheal (as able) and nasal secretions for changes in amount and color  - Gary appropriate cooling/warming therapies per order  - Administer medications as ordered  - Instruct and encourage patient and family to use good hand hygiene technique  - Identify and instruct in appropriate isolation precautions for identified infection/condition  Outcome: Progressing  Goal: Absence of fever/infection during neutropenic period  Description  INTERVENTIONS:  - Monitor WBC  - Implement neutropenic guidelines  Outcome: Progressing     Problem: SAFETY ADULT  Goal: Patient will remain free of falls  Description  INTERVENTIONS:  - Assess patient frequently for physical needs  -  Identify cognitive and physical deficits and behaviors that affect risk of falls    -  [facility] fall precautions as indicated by assessment   - Educate patient/family on patient safety including physical limitations  - Instruct patient to call for assistance with activity based on assessment  - Modify environment to reduce risk of injury  - Consider OT/PT consult to assist with strengthening/mobility  Outcome: Progressing  Goal: Maintain or return to baseline ADL function  Description  INTERVENTIONS:  -  Assess patient's ability to carry out ADLs; assess patient's baseline for ADL function and identify physical deficits which impact ability to perform ADLs (bathing, care of mouth/teeth, toileting, grooming, dressing, etc )  - Assess/evaluate cause of self-care deficits   - Assess range of motion  - Assess patient's mobility; develop plan if impaired  - Assess patient's need for assistive devices and provide as appropriate  - Encourage maximum independence but intervene and supervise when necessary  ¯ Involve family in performance of ADLs  ¯ Assess for home care needs following discharge   ¯ Request OT consult to assist with ADL evaluation and planning for discharge  ¯ Provide patient education as appropriate  Outcome: Progressing  Goal: Maintain or return mobility status to optimal level  Description  INTERVENTIONS:  - Assess patient's baseline mobility status (ambulation, transfers, stairs, etc )    - Identify cognitive and physical deficits and behaviors that affect mobility  - Identify mobility aids required to assist with transfers and/or ambulation (gait belt, sit-to-stand, lift, walker, cane, etc )  - [place] fall precautions as indicated by assessment  - Record patient progress and toleration of activity level on Mobility SBAR; progress patient to next Phase/Stage  - Instruct patient to call for assistance with activity based on assessment  - Request Rehabilitation consult to assist with strengthening/weightbearing, etc   Outcome: Progressing     Problem: DISCHARGE PLANNING  Goal: Discharge to home or other facility with appropriate resources  Description  INTERVENTIONS:  - Identify barriers to discharge w/patient and caregiver  - Arrange for needed discharge resources and transportation as appropriate  - Identify discharge learning needs (meds, wound care, etc )  - Arrange for interpretive services to assist at discharge as needed  - Refer to Case Management Department for coordinating discharge planning if the patient needs post-hospital services based on physician/advanced practitioner order or complex needs related to functional status, cognitive ability, or social support system  Outcome: Progressing     Problem: Knowledge Deficit  Goal: Patient/family/caregiver demonstrates understanding of disease process, treatment plan, medications, and discharge instructions  Description  Complete learning assessment and assess knowledge base  Interventions:  - Provide teaching at level of understanding  - Provide teaching via preferred learning methods  Outcome: Progressing     Problem: CARDIOVASCULAR - ADULT  Goal: Maintains optimal cardiac output and hemodynamic stability  Description  INTERVENTIONS:  - Monitor I/O, vital signs and rhythm  - Monitor for S/S and trends of decreased cardiac output i e  bleeding, hypotension  - Administer and titrate ordered vasoactive medications to optimize hemodynamic stability  - Assess quality of pulses, skin color and temperature  - Assess for signs of decreased coronary artery perfusion - ex   Angina  - Instruct patient to report change in severity of symptoms  Outcome: Progressing  Goal: Absence of cardiac dysrhythmias or at baseline rhythm  Description  INTERVENTIONS:  - Continuous cardiac monitoring, monitor vital signs, obtain 12 lead EKG if indicated  - Administer antiarrhythmic and heart rate control medications as ordered  - Monitor electrolytes and administer replacement therapy as ordered  Outcome: Progressing     Problem: METABOLIC, FLUID AND ELECTROLYTES - ADULT  Goal: Electrolytes maintained within normal limits  Description  INTERVENTIONS:  - Monitor labs and assess patient for signs and symptoms of electrolyte imbalances  - Administer electrolyte replacement as ordered  - Monitor response to electrolyte replacements, including repeat lab results as appropriate  - Instruct patient on fluid and nutrition as appropriate  Outcome: Progressing  Goal: Fluid balance maintained  Description  INTERVENTIONS:  - Monitor labs and assess for signs and symptoms of volume excess or deficit  - Monitor I/O and WT  - Instruct patient on fluid and nutrition as appropriate  Outcome: Progressing     Problem: Potential for Falls  Goal: Patient will remain free of falls  Description  INTERVENTIONS:  - Assess patient frequently for physical needs  -  Identify cognitive and physical deficits and behaviors that affect risk of falls    -  [place] fall precautions as indicated by assessment   - Educate patient/family on patient safety including physical limitations  - Instruct patient to call for assistance with activity based on assessment  - Modify environment to reduce risk of injury  - Consider OT/PT consult to assist with strengthening/mobility  Outcome: Progressing     Problem: Prexisting or High Potential for Compromised Skin Integrity  Goal: Skin integrity is maintained or improved  Description  INTERVENTIONS:  - Identify patients at risk for skin breakdown  - Assess and monitor skin integrity  - Assess and monitor nutrition and hydration status  - Monitor labs (i e  albumin)  - Assess for incontinence   - Turn and reposition patient  - Assist with mobility/ambulation  - Relieve pressure over bony prominences  - Avoid friction and shearing  - Provide appropriate hygiene as needed including keeping skin clean and dry  - Evaluate need for skin moisturizer/barrier cream  - Collaborate with interdisciplinary team (i e  Nutrition, Rehabilitation, etc )   - Patient/family teaching  Outcome: Progressing

## 2019-03-21 NOTE — PROGRESS NOTES
Daily Progress Note - Critical Care/ Karel Nissen 60 y o  male MRN: [id number]  Unit/Bed#: ICU 02 Encounter: [id number]    ______________________________________________________________________  Assessment:   Principal Problem:    Acute on chronic respiratory failure with hypoxia (HCC)  Active Problems:    Chronic bronchitis with acute exacerbation (HCC)    Atrial fibrillation with RVR (HCC)    SHAVONNE (obstructive sleep apnea)    Esophageal reflux    Benign essential hypertension    Diabetes mellitus type 2, uncontrolled (HCC)    CAD (coronary artery disease)    Bipolar affective disorder ([facility] )  Resolved Problems:    * No resolved hospital problems  *      * Acute on chronic respiratory failure with hypoxia (HCC)  Assessment & Plan  · Secondary to acute on chronic bronchitis exacerbation  · Continue nasal cannula  · Continue HS bipap  · Continue Zithromax, steroids, and duo nebs    Chronic bronchitis with acute exacerbation (HCC)  Assessment & Plan  · Possibly triggered by viral illness  · Procalcitonin negative  · Continue Zithromax, steroids, and breathing treatments  ·     Atrial fibrillation with RVR (HCC)  Assessment & Plan  · Likely exacerbated by COPD exacerbation  · Continue oral beta-blocker- increased to 75 mg metoprolol b i d   · P r n  Metoprolol 5 mg for heart rate greater than 120  · Continue Eliquis    SHAVONNE (obstructive sleep apnea)  Assessment & Plan  · Normally wear CPAP with 2 L at night    Patient states he is more comfortable on BiPAP  · Continue BiPAP HS    Benign essential hypertension  Assessment & Plan  · Continue metoprolol  · Lasix 40 mg x1    Esophageal reflux  Assessment & Plan  · Continue Protonix    Diabetes mellitus type 2, uncontrolled (HCC)  Assessment & Plan  Lab Results   Component Value Date    HGBA1C 8 4 (A) 02/18/2019       Recent Labs     03/20/19  2218 03/21/19  1120 03/21/19  1618   POCGLU 315* 285* 327*       Blood Sugar Average: Last 72 hrs:  (P) 309 continue Lantus 45 units at night   Currently on algorithm 5  Blood sugars remain greater than 180  Hyperglycemia likely related to steroid use  Add 10 units of Humalog with meals  Consider Deescalating after steroids discontinued  CAD (coronary artery disease)  Assessment & Plan  · Continue daily aspirin, beta-blocker    Bipolar affective disorder (HCC)  Assessment & Plan  · Continue Seroquel, Effexor, BuSpar, and Xanax as needed        VTE Prophylaxis:  · Pharmacologic Prophylaxis: Eliquis  · Mechanical Prophylaxis: sequential compression device    Disposition: Transfer to telemetry    Code Status: Level 1 - Full Code    Counseling / Coordination of Care  Total time spent today 32 minutes  Greater than 50% of total time was spent with the patient and / or family counseling and / or coordination of care  A description of the counseling / coordination of care: d/w attending  d/w pt  d/w nursing  ______________________________________________________________________    HPI/24hr events:  Patient weaned off of BiPAP onto nasal cannula  Continues in AFib    ______________________________________________________________________    Physical Exam:   Physical Exam   Constitutional: He is oriented to person, place, and time  He appears well-developed and well-nourished  HENT:   Head: Normocephalic and atraumatic  Eyes: Pupils are equal, round, and reactive to light  Scleral icterus is present  Neck: Neck supple  JVD present  Cardiovascular: Normal heart sounds and normal pulses  A regularly irregular rhythm present  Occasional extrasystoles are present  Tachycardia present  Pulmonary/Chest: Effort normal  Tachypnea noted  He has wheezes  He has rhonchi  Abdominal: Soft  He exhibits distension  There is no tenderness  Musculoskeletal: He exhibits edema  Neurological: He is alert and oriented to person, place, and time  No cranial nerve deficit  Skin: Skin is warm and dry  Capillary refill takes less than 2 seconds  ______________________________________________________________________  Vitals:    19 1700 19 1800 19 2000 19   BP: 150/89 (!) 162/107     Pulse: (!) 130 (!) 129     Resp: (!) 32 (!) 26     Temp:   97 8 °F (36 6 °C)    TempSrc:   Tympanic    SpO2: 95% 95%  98%   Weight:       Height:                  Temperature:   Temp (24hrs), Av 7 °F (36 5 °C), Min:97 6 °F (36 4 °C), Max:97 9 °F (36 6 °C)    Current Temperature: 97 8 °F (36 6 °C)    Weights:   IBW: 75 3 kg    Body mass index is 41 08 kg/m²  Weight (last 2 days)     Date/Time   Weight    19 1818   134 (294 53)              Hemodynamic Monitoring:  N/A       Non-Invasive/Invasive Ventilation Settings:  Respiratory    Lab Data (Last 4 hours)    None         O2/Vent Data (Last 4 hours)    None              No results found for: PHART, IJI0JMA, PO2ART, MZW6DRS, E5UWGPBA, BEART, SOURCE  SpO2: SpO2: 98 %, SpO2 Activity: SpO2 Activity: At Rest, SpO2 Device: O2 Device: Nasal cannula    Intake and Outputs:  I/O        07 -  0700  07 -  0700  07 -  0700    P  O   480     Total Intake(mL/kg)  480 (3 6)     Urine (mL/kg/hr)  900     Total Output  900     Net  -420                    Nutrition:        Diet Orders   (From admission, onward)            Start     Ordered    19  Diet Campos/CHO Controlled; Consistent Carbohydrate Diet Level 2 (5 carb servings/75 grams CHO/meal)  Diet effective now     Question Answer Comment   Diet Type Campos/CHO Controlled    Campos/CHO Controlled Consistent Carbohydrate Diet Level 2 (5 carb servings/75 grams CHO/meal)    RD to adjust diet per protocol?  No        19            Labs:   Results from last 7 days   Lab Units 19  0550 19  1445   WBC Thousand/uL 11 27* 9 83   HEMOGLOBIN g/dL 14 1 14 7   HEMATOCRIT % 43 9 44 2   PLATELETS Thousands/uL 199 198   NEUTROS PCT % 73 61   MONOS PCT % 3* 8     Results from last 7 days   Lab Units 03/21/19  0550 03/20/19  1445   SODIUM mmol/L 138 138   POTASSIUM mmol/L 4 6 4 0   CHLORIDE mmol/L 102 101   CO2 mmol/L 26 29   BUN mg/dL 29* 18   CREATININE mg/dL 1 02 1 03   CALCIUM mg/dL 9 4 9 4   ALK PHOS U/L  --  72   ALT U/L  --  35   AST U/L  --  14     Results from last 7 days   Lab Units 03/21/19  0550   MAGNESIUM mg/dL 1 8     Lab Results   Component Value Date    PHOS 3 5 03/21/2019    PHOS 4 0 11/18/2018    PHOS 3 1 11/16/2018          0   Lab Value Date/Time    TROPONINI 0 02 03/20/2019 2049    TROPONINI 0 03 03/20/2019 1445    TROPONINI <0 02 11/16/2018 0917    TROPONINI <0 02 11/16/2018 0438    TROPONINI <0 02 11/16/2018 0024    TROPONINI <0 02 10/31/2018 0926    TROPONINI 0 02 11/06/2017 1639    TROPONINI <0 02 11/06/2017 0927    TROPONINI <0 02 03/20/2017 0503    TROPONINI <0 02 03/20/2017 0009    TROPONINI <0 02 12/11/2016 1314    TROPONINI <0 02 11/28/2016 0905    TROPONINI <0 02 11/27/2016 1341    TROPONINI <0 02 11/27/2016 0908    TROPONINI <0 02 09/14/2016 1300    TROPONINI <0 02 09/12/2016 1706    TROPONINI <0 02 09/03/2016 0443    TROPONINI 0 02 09/02/2016 2321    TROPONINI 0 02 09/02/2016 2021    TROPONINI <0 02 09/02/2016 1435    TROPONINI <0 02 08/08/2016 1139    TROPONINI <0 02 01/13/2016 0930    TROPONINI <0 02 01/12/2016 1429         ABG:  Lab Results   Component Value Date    PHART 7 383 11/16/2018    RFV5EDA 28 2 (LL) 11/16/2018    PO2ART 73 2 (L) 11/16/2018    OHK0PTH 16 4 (L) 11/16/2018    BEART -7 2 11/16/2018    SOURCE Radial, Left 11/16/2018       Imaging: CXR:   XR chest portable- ICU   Final Result      Enlargement of cardiac silhouette  Somewhat prominent pulmonary vasculature, correlate for clinical concern of pulmonary congestion      No acute focal infiltrate is seen            Workstation performed: [id number]         XR chest 1 view portable   Final Result      No acute cardiopulmonary disease              Workstation performed: [id number]            I have personally reviewed pertinent reports  and I have personally reviewed pertinent films in PACS  ECHO: Pending    Micro:  Lab Results   Component Value Date    BLOODCX No Growth After 5 Days  11/16/2018    BLOODCX Staphylococcus coagulase negative (A) 11/16/2018    BLOODCX No Growth After 5 Days  10/31/2018    URINECX 9435-3669 cfu/ml Mixed Contaminants X2 03/20/2017    URINECX No Growth <1000 cfu/mL 11/27/2016    URINECX No Growth <1000 cfu/mL 09/02/2016    SPUTUMCULTUR Test not performed  Suggest repeat specimen  03/21/2019    SPUTUMCULTUR Test not performed  Suggest repeat specimen  11/08/2017    SPUTUMCULTUR 1+ Growth of Staphylococcus aureus 03/20/2017    SPUTUMCULTUR 1+ Growth of Mixed Respiratory Francine 03/20/2017       Allergies:    Allergies   Allergen Reactions    Wellbutrin [Bupropion] Other (See Comments)     Alteration with hearing and other senses     Medications:   Scheduled Meds:    Current Facility-Administered Medications:  ALPRAZolam 2 mg Oral HS PRN MANAS Ramírez   apixaban 5 mg Oral BID Annamaria MANAS Cadet   aspirin 81 mg Oral QAM Annamaria MANAS Cadet   azithromycin 500 mg Oral Q24H AnnamariaMANAS Saldivar   benzonatate 200 mg Oral TID PRN MANAS Oneal   busPIRone 15 mg Oral BID Annamaria MANAS Cdaet   fenofibrate 160 mg Oral Daily Annamaria MANAS Cadet   insulin glargine 45 Units Subcutaneous HS dEuarda Jean Baptiste PA-C   insulin lispro 4-20 Units Subcutaneous 4x Daily (AC & HS) Eduarda Jean Baptiste PA-C   insulin lispro 6 Units Subcutaneous TID With Meals Miguelina Cooper PA-C   ipratropium 0 5 mg Nebulization 4x Daily Annamaria MANAS Cadet   And       levalbuterol 1 25 mg Nebulization 4x Daily AnnamariaMANAS Saldivar   methocarbamol 500 mg Oral HS PRN MANAS Ramírez   methylPREDNISolone sodium succinate 40 mg Intravenous Q8H [facility] MANAS Josue   metoprolol 5 mg Intravenous Q6H PRN MANGO Burgessoprolol tartrate 75 mg Oral Q12H [address] Miguelina Cooper PA-C   pantoprazole 40 mg Oral Early Morning MANAS Josue   pregabalin 50 mg Oral TID MANAS Josue   QUEtiapine 400 mg Oral HS MANAS Josue   venlafaxine 150 mg Oral QAM MANAS Josue     Continuous Infusions:   PRN Meds:    ALPRAZolam 2 mg HS PRN   benzonatate 200 mg TID PRN   methocarbamol 500 mg HS PRN   metoprolol 5 mg Q6H PRN       Invasive lines and devices:   Invasive Devices     Peripheral Intravenous Line            Peripheral IV 03/20/19 Left Antecubital 1 day                   SIGNATURE: Nanda Chamorro PA-C  DATE: March 21, 2019

## 2019-03-22 LAB
ALBUMIN SERPL BCP-MCNC: 3.4 G/DL (ref 3.5–5)
ALP SERPL-CCNC: 48 U/L (ref 46–116)
ALT SERPL W P-5'-P-CCNC: 32 U/L (ref 12–78)
ANION GAP SERPL CALCULATED.3IONS-SCNC: 10 MMOL/L (ref 4–13)
AST SERPL W P-5'-P-CCNC: 11 U/L (ref 5–45)
ATRIAL RATE: 133 BPM
BASOPHILS # BLD AUTO: 0.02 THOUSANDS/ΜL (ref 0–0.1)
BASOPHILS NFR BLD AUTO: 0 % (ref 0–1)
BILIRUB SERPL-MCNC: 0.4 MG/DL (ref 0.2–1)
BUN SERPL-MCNC: 28 MG/DL (ref 5–25)
CALCIUM SERPL-MCNC: 9.7 MG/DL (ref 8.3–10.1)
CHLORIDE SERPL-SCNC: 98 MMOL/L (ref 100–108)
CO2 SERPL-SCNC: 29 MMOL/L (ref 21–32)
CREAT SERPL-MCNC: 1.09 MG/DL (ref 0.6–1.3)
EOSINOPHIL # BLD AUTO: 0 THOUSAND/ΜL (ref 0–0.61)
EOSINOPHIL NFR BLD AUTO: 0 % (ref 0–6)
ERYTHROCYTE [DISTWIDTH] IN BLOOD BY AUTOMATED COUNT: 12.9 % (ref 11.6–15.1)
GFR SERPL CREATININE-BSD FRML MDRD: 74 ML/MIN/1.73SQ M
GLUCOSE SERPL-MCNC: 221 MG/DL (ref 65–140)
GLUCOSE SERPL-MCNC: 226 MG/DL (ref 65–140)
GLUCOSE SERPL-MCNC: 295 MG/DL (ref 65–140)
GLUCOSE SERPL-MCNC: 334 MG/DL (ref 65–140)
GLUCOSE SERPL-MCNC: 350 MG/DL (ref 65–140)
HCT VFR BLD AUTO: 43.7 % (ref 36.5–49.3)
HGB BLD-MCNC: 14.1 G/DL (ref 12–17)
IMM GRANULOCYTES # BLD AUTO: 0.07 THOUSAND/UL (ref 0–0.2)
IMM GRANULOCYTES NFR BLD AUTO: 1 % (ref 0–2)
LYMPHOCYTES # BLD AUTO: 2.33 THOUSANDS/ΜL (ref 0.6–4.47)
LYMPHOCYTES NFR BLD AUTO: 18 % (ref 14–44)
MAGNESIUM SERPL-MCNC: 2.1 MG/DL (ref 1.6–2.6)
MCH RBC QN AUTO: 29.7 PG (ref 26.8–34.3)
MCHC RBC AUTO-ENTMCNC: 32.3 G/DL (ref 31.4–37.4)
MCV RBC AUTO: 92 FL (ref 82–98)
MONOCYTES # BLD AUTO: 0.6 THOUSAND/ΜL (ref 0.17–1.22)
MONOCYTES NFR BLD AUTO: 5 % (ref 4–12)
MRSA NOSE QL CULT: NORMAL
NEUTROPHILS # BLD AUTO: 10.08 THOUSANDS/ΜL (ref 1.85–7.62)
NEUTS SEG NFR BLD AUTO: 76 % (ref 43–75)
NRBC BLD AUTO-RTO: 0 /100 WBCS
PLATELET # BLD AUTO: 202 THOUSANDS/UL (ref 149–390)
PMV BLD AUTO: 10.1 FL (ref 8.9–12.7)
POTASSIUM SERPL-SCNC: 4.5 MMOL/L (ref 3.5–5.3)
PROCALCITONIN SERPL-MCNC: <0.05 NG/ML
PROT SERPL-MCNC: 6.6 G/DL (ref 6.4–8.2)
QRS AXIS: 92 DEGREES
QRSD INTERVAL: 86 MS
QT INTERVAL: 278 MS
QTC INTERVAL: 396 MS
RBC # BLD AUTO: 4.74 MILLION/UL (ref 3.88–5.62)
SODIUM SERPL-SCNC: 137 MMOL/L (ref 136–145)
T WAVE AXIS: -2 DEGREES
VENTRICULAR RATE: 122 BPM
WBC # BLD AUTO: 13.1 THOUSAND/UL (ref 4.31–10.16)

## 2019-03-22 PROCEDURE — 94640 AIRWAY INHALATION TREATMENT: CPT

## 2019-03-22 PROCEDURE — 94660 CPAP INITIATION&MGMT: CPT

## 2019-03-22 PROCEDURE — 80053 COMPREHEN METABOLIC PANEL: CPT | Performed by: NURSE PRACTITIONER

## 2019-03-22 PROCEDURE — 99232 SBSQ HOSP IP/OBS MODERATE 35: CPT | Performed by: INTERNAL MEDICINE

## 2019-03-22 PROCEDURE — 85025 COMPLETE CBC W/AUTO DIFF WBC: CPT | Performed by: NURSE PRACTITIONER

## 2019-03-22 PROCEDURE — 84145 PROCALCITONIN (PCT): CPT | Performed by: NURSE PRACTITIONER

## 2019-03-22 PROCEDURE — 94760 N-INVAS EAR/PLS OXIMETRY 1: CPT

## 2019-03-22 PROCEDURE — 93010 ELECTROCARDIOGRAM REPORT: CPT | Performed by: INTERNAL MEDICINE

## 2019-03-22 PROCEDURE — 83735 ASSAY OF MAGNESIUM: CPT | Performed by: NURSE PRACTITIONER

## 2019-03-22 PROCEDURE — 82948 REAGENT STRIP/BLOOD GLUCOSE: CPT

## 2019-03-22 RX ORDER — METHYLPREDNISOLONE SODIUM SUCCINATE 40 MG/ML
40 INJECTION, POWDER, LYOPHILIZED, FOR SOLUTION INTRAMUSCULAR; INTRAVENOUS EVERY 12 HOURS SCHEDULED
Status: DISCONTINUED | OUTPATIENT
Start: 2019-03-22 | End: 2019-03-23

## 2019-03-22 RX ADMIN — IPRATROPIUM BROMIDE 0.5 MG: 0.5 SOLUTION RESPIRATORY (INHALATION) at 19:40

## 2019-03-22 RX ADMIN — LEVALBUTEROL HYDROCHLORIDE 1.25 MG: 1.25 SOLUTION, CONCENTRATE RESPIRATORY (INHALATION) at 11:48

## 2019-03-22 RX ADMIN — INSULIN LISPRO 6 UNITS: 100 INJECTION, SOLUTION INTRAVENOUS; SUBCUTANEOUS at 12:00

## 2019-03-22 RX ADMIN — METHYLPREDNISOLONE SODIUM SUCCINATE 40 MG: 40 INJECTION, POWDER, FOR SOLUTION INTRAMUSCULAR; INTRAVENOUS at 21:22

## 2019-03-22 RX ADMIN — PANTOPRAZOLE SODIUM 40 MG: 40 TABLET, DELAYED RELEASE ORAL at 06:29

## 2019-03-22 RX ADMIN — APIXABAN 5 MG: 5 TABLET, FILM COATED ORAL at 08:49

## 2019-03-22 RX ADMIN — AZITHROMYCIN MONOHYDRATE 500 MG: 250 TABLET ORAL at 16:48

## 2019-03-22 RX ADMIN — METOPROLOL TARTRATE 5 MG: 5 INJECTION, SOLUTION INTRAVENOUS at 20:27

## 2019-03-22 RX ADMIN — VENLAFAXINE HYDROCHLORIDE 150 MG: 150 CAPSULE, EXTENDED RELEASE ORAL at 08:48

## 2019-03-22 RX ADMIN — INSULIN LISPRO 8 UNITS: 100 INJECTION, SOLUTION INTRAVENOUS; SUBCUTANEOUS at 08:40

## 2019-03-22 RX ADMIN — IPRATROPIUM BROMIDE 0.5 MG: 0.5 SOLUTION RESPIRATORY (INHALATION) at 15:14

## 2019-03-22 RX ADMIN — METOPROLOL TARTRATE 75 MG: 50 TABLET, FILM COATED ORAL at 08:48

## 2019-03-22 RX ADMIN — IPRATROPIUM BROMIDE 0.5 MG: 0.5 SOLUTION RESPIRATORY (INHALATION) at 07:36

## 2019-03-22 RX ADMIN — INSULIN LISPRO 16 UNITS: 100 INJECTION, SOLUTION INTRAVENOUS; SUBCUTANEOUS at 12:00

## 2019-03-22 RX ADMIN — PREGABALIN 50 MG: 50 CAPSULE ORAL at 08:48

## 2019-03-22 RX ADMIN — BUSPIRONE HYDROCHLORIDE 15 MG: 10 TABLET ORAL at 08:48

## 2019-03-22 RX ADMIN — ASPIRIN 81 MG 81 MG: 81 TABLET ORAL at 08:48

## 2019-03-22 RX ADMIN — PREGABALIN 50 MG: 50 CAPSULE ORAL at 21:22

## 2019-03-22 RX ADMIN — APIXABAN 5 MG: 5 TABLET, FILM COATED ORAL at 18:10

## 2019-03-22 RX ADMIN — METHYLPREDNISOLONE SODIUM SUCCINATE 40 MG: 40 INJECTION, POWDER, FOR SOLUTION INTRAMUSCULAR; INTRAVENOUS at 13:07

## 2019-03-22 RX ADMIN — QUETIAPINE FUMARATE 400 MG: 200 TABLET, EXTENDED RELEASE ORAL at 21:24

## 2019-03-22 RX ADMIN — LEVALBUTEROL HYDROCHLORIDE 1.25 MG: 1.25 SOLUTION, CONCENTRATE RESPIRATORY (INHALATION) at 19:40

## 2019-03-22 RX ADMIN — ALPRAZOLAM 2 MG: 0.5 TABLET ORAL at 21:24

## 2019-03-22 RX ADMIN — INSULIN LISPRO 6 UNITS: 100 INJECTION, SOLUTION INTRAVENOUS; SUBCUTANEOUS at 08:40

## 2019-03-22 RX ADMIN — INSULIN LISPRO 6 UNITS: 100 INJECTION, SOLUTION INTRAVENOUS; SUBCUTANEOUS at 16:49

## 2019-03-22 RX ADMIN — INSULIN LISPRO 12 UNITS: 100 INJECTION, SOLUTION INTRAVENOUS; SUBCUTANEOUS at 16:48

## 2019-03-22 RX ADMIN — INSULIN GLARGINE 45 UNITS: 100 INJECTION, SOLUTION SUBCUTANEOUS at 21:21

## 2019-03-22 RX ADMIN — BUSPIRONE HYDROCHLORIDE 15 MG: 10 TABLET ORAL at 18:10

## 2019-03-22 RX ADMIN — BENZONATATE 200 MG: 100 CAPSULE ORAL at 12:00

## 2019-03-22 RX ADMIN — INSULIN LISPRO 16 UNITS: 100 INJECTION, SOLUTION INTRAVENOUS; SUBCUTANEOUS at 21:22

## 2019-03-22 RX ADMIN — METHYLPREDNISOLONE SODIUM SUCCINATE 40 MG: 40 INJECTION, POWDER, FOR SOLUTION INTRAMUSCULAR; INTRAVENOUS at 05:33

## 2019-03-22 RX ADMIN — PREGABALIN 50 MG: 50 CAPSULE ORAL at 16:49

## 2019-03-22 RX ADMIN — METOPROLOL TARTRATE 75 MG: 50 TABLET, FILM COATED ORAL at 21:21

## 2019-03-22 RX ADMIN — LEVALBUTEROL HYDROCHLORIDE 1.25 MG: 1.25 SOLUTION, CONCENTRATE RESPIRATORY (INHALATION) at 15:14

## 2019-03-22 RX ADMIN — LEVALBUTEROL HYDROCHLORIDE 1.25 MG: 1.25 SOLUTION, CONCENTRATE RESPIRATORY (INHALATION) at 07:36

## 2019-03-22 RX ADMIN — IPRATROPIUM BROMIDE 0.5 MG: 0.5 SOLUTION RESPIRATORY (INHALATION) at 11:48

## 2019-03-22 NOTE — ASSESSMENT & PLAN NOTE
-patient is both chronically hypoxemic and on CPAP at home requiring 2 L nasal cannula oxygen / and is chronically on 10 cm of water CPAP  -patient also has chronic emphysematous disease  -w/ history of Severe restriction on spirometry w/ ratios of 70-72%  -FVC 41%  -FEV1 39%

## 2019-03-22 NOTE — PLAN OF CARE
Problem: DISCHARGE PLANNING - CARE MANAGEMENT  Goal: Discharge to post-acute care or home with appropriate resources  Description  INTERVENTIONS:  - Conduct assessment to determine patient/family and health care team treatment goals, and need for post-acute services based on payer coverage, community resources, and patient preferences, and barriers to discharge  - Address psychosocial, clinical, and financial barriers to discharge as identified in assessment in conjunction with the patient/family and health care team  - Arrange appropriate level of post-acute services according to patient's   needs and preference and payer coverage in collaboration with the physician and health care team  - Communicate with and update the patient/family, physician, and health care team regarding progress on the discharge plan  - Arrange appropriate transportation to post-acute venues    Patient is independent  May need Bipap upon discharge  SW will follow for needs  Outcome: Progressing  Note:   SW met with patient at bedside to discuss discharge planning  Patient is alert and oriented  He lives in [place] with a roommate in a 2 story home with 14 steps to the second floor  He uses a walker, cane, 2L/O2, nebulizer, and Cpap  He orders his DME from [employer]  Patient has used [facility] before and also has been to [facility] for inpatient rehab  Patient states he may need a bipap upon discharge  SW will follow for needs

## 2019-03-22 NOTE — PROGRESS NOTES
Report given to [facility]  Pt seen a bedside rounds, in no distress, offers no complaints

## 2019-03-22 NOTE — ASSESSMENT & PLAN NOTE
Continue 2 liters oxygen during the day and patient currently is on BiPAP q h s   Which will be continued

## 2019-03-22 NOTE — ASSESSMENT & PLAN NOTE
Resolved  Patient needed BiPAP initially and was admitted to step-down unit  Likely secondary COPD exacerbation  Patient is currently on 2 liters of oxygen which he chronically uses even at home

## 2019-03-22 NOTE — ASSESSMENT & PLAN NOTE
-pt w/ hx chronic cough and chronic bronchitis  -pt on trelegy triple therapy chronically > resume in the outpatient setting  -on ATC nebs available at home utilize t i d   And p r n   -wean prednisone to 40 mg daily x3 days and wean down every 3 days from 30, 20, 10,   -stable and ready for d/c from Pulmonary standpoint  -Pulmonary to sign off  -patient needs outpatient follow up within 10-14 days

## 2019-03-22 NOTE — SOCIAL WORK
LOS 2 days  Patient is not a bundle or a readmission  SW met with patient at bedside to discuss discharge planning  Patient is alert and oriented  He lives in [place] with a roommate in a 2 story home with 14 steps to the second floor  He uses a walker, cane, 2L/O2, nebulizer, and Cpap  He orders his DME from [employer]  Patient has used [facility] before and also has been to [facility] for inpatient rehab  Patient states he may need a bipap upon discharge  SW will follow for needs

## 2019-03-22 NOTE — PROGRESS NOTES
Progress Note Bo Mark 1960, 60 y o  male MRN: [id number]    Unit/Bed#: [address] [place] [address] Encounter: [id number]    Primary Care Provider: Dee Dee Vera MD   Date and time admitted to hospital: 3/20/2019  2:29 PM        * Acute on chronic respiratory failure with hypoxia [facility])  Assessment & Plan  Resolved  Patient needed BiPAP initially and was admitted to step-down unit  Likely secondary COPD exacerbation  Patient is currently on 2 liters of oxygen which he chronically uses even at home    Chronic bronchitis with acute exacerbation ([facility] )  Assessment & Plan  Likely precipitated by viral bronchitis  Procalcitonin level was negative  Patient is on IV Solu-Medrol 40 milligram IV q 8 hours which will be tapered to IV q 12 hours  Continue azithromycin and nebulizers along with symptomatic treatment with Tessalon Perles    Atrial fibrillation with RVR ([facility] )  Assessment & Plan  Likely secondary to COPD exacerbation  Continue metoprolol 75 milligram p o  B i d   Patient is on anticoagulation with Eliquis  SHAVONNE and COPD overlap syndrome (HCC)  Assessment & Plan  Continue 2 liters oxygen during the day and patient currently is on BiPAP q h s  Which will be continued    Esophageal reflux  Assessment & Plan  Continue Protonix    CAD (coronary artery disease)  Assessment & Plan  Continue aspirin, metoprolol and statin    Bipolar affective disorder (HCC)  Assessment & Plan  Continue Seroquel, Xanax, Effexor and BuSpar          VTE Pharmacologic Prophylaxis:   Pharmacologic: Apixaban (Eliquis)  Mechanical VTE Prophylaxis in Place: No    Patient Centered Rounds: I have performed bedside rounds with nursing staff today  Discussions with Specialists or Other Care Team Provider: [facility]  Education and Discussions with Family / Patient:Yes  Time Spent for Care: 45 minutes  More than 50% of total time spent on counseling and coordination of care as described above      Current Length of Stay: 2 day(s)  Current Patient Status: Inpatient     Discharge Plan:  Home    Code Status: Level 1 - Full Code      Subjective:   Patient has improved shortness of breath and cough  Patient denies any chest pain, abdominal pain, nausea or vomiting  Objective:     Vitals:   Temp (24hrs), Av 9 °F (36 6 °C), Min:97 8 °F (36 6 °C), Max:98 1 °F (36 7 °C)    Temp:  [97 8 °F (36 6 °C)-98 1 °F (36 7 °C)] 97 9 °F (36 6 °C)  HR:  [] 95  Resp:  [17-32] 18  BP: (133-181)/() 156/88  SpO2:  [94 %-98 %] 94 %  Body mass index is 40 89 kg/m²  Input and Output Summary (last 24 hours): Intake/Output Summary (Last 24 hours) at 3/22/2019 1608  Last data filed at 3/22/2019 1153  Gross per 24 hour   Intake 510 ml   Output 4200 ml   Net -3690 ml        Physical Exam:     Physical Exam   Constitutional: No distress  HENT:   Head: Normocephalic and atraumatic  Eyes: Pupils are equal, round, and reactive to light  Conjunctivae are normal    Neck: Normal range of motion  Neck supple  Cardiovascular: Normal rate and normal heart sounds  Irregularly irregular   Pulmonary/Chest: Effort normal  No respiratory distress  He has no wheezes  He has no rhonchi  He has no rales  He exhibits no tenderness  Decreased breath sounds bilateral   Abdominal: Soft  Bowel sounds are normal  He exhibits no distension  There is no tenderness  There is no rebound and no guarding  Musculoskeletal: He exhibits edema  Trace edema   Neurological: He is alert  No cranial nerve deficit  Skin: Skin is warm and dry  No rash noted         Additional Data:     Labs:    Results from last 7 days   Lab Units 19  0540 19  0550 19  1445   WBC Thousand/uL 13 10* 11 27* 9 83   HEMOGLOBIN g/dL 14 1 14 1 14 7   HEMATOCRIT % 43 7 43 9 44 2   PLATELETS Thousands/uL 202 199 198   NEUTROS PCT % 76* 73 61     Results from last 7 days   Lab Units 19  0540 19  0550 19  1445   SODIUM mmol/L 137 138 138   POTASSIUM mmol/L 4 5 4 6 4 0 CHLORIDE mmol/L 98* 102 101   CO2 mmol/L 29 26 29   BUN mg/dL 28* 29* 18   CREATININE mg/dL 1 09 1 02 1 03   CALCIUM mg/dL 9 7 9 4 9 4   TOTAL BILIRUBIN mg/dL 0 40  --  0 30   ALK PHOS U/L 48  --  72   ALT U/L 32  --  35   AST U/L 11  --  14         Results from last 7 days   Lab Units 03/20/19  2049 03/20/19  1445   TROPONIN I ng/mL 0 02 0 03     Lab Results   Component Value Date/Time    HGBA1C 8 4 (A) 02/18/2019 11:38 AM    HGBA1C 7 6 (H) 10/29/2018 08:01 AM    HGBA1C 8 0 07/21/2017 09:27 AM     Results from last 7 days   Lab Units 03/22/19  1108 03/22/19  0707 03/21/19  2205 03/21/19  2136 03/21/19  1618 03/21/19  1120 03/20/19  2218   POC GLUCOSE mg/dl 334* 226* 306* 314* 327* 285* 315*     Results from last 7 days   Lab Units 03/22/19  0540 03/20/19  1751   PROCALCITONIN ng/ml <0 05 <0 05       * I Have Reviewed All Lab Data Listed Above  * Additional Pertinent Lab Tests Reviewed: [address] [facility] Admission Reviewed    Imaging:     XR chest portable- ICU   Final Result by Jose Bautista MD (03/21 1014)      Enlargement of cardiac silhouette  Somewhat prominent pulmonary vasculature, correlate for clinical concern of pulmonary congestion      No acute focal infiltrate is seen            Workstation performed: [id number]         XR chest 1 view portable   Final Result by Ramakrishna Leija MD (03/20 1513)      No acute cardiopulmonary disease  Workstation performed: [id number]           Imaging Reports Reviewed by myself    Cultures:   Blood Culture:   Lab Results   Component Value Date    BLOODCX No Growth After 5 Days  11/16/2018    BLOODCX Staphylococcus coagulase negative (A) 11/16/2018    BLOODCX No Growth After 5 Days  10/31/2018    BLOODCX No Growth After 5 Days  10/31/2018    BLOODCX No Growth After 5 Days  09/06/2017    BLOODCX No Growth After 5 Days   09/06/2017     Urine Culture:   Lab Results   Component Value Date    URINECX 9183-0327 cfu/ml Mixed Contaminants X2 03/20/2017 URINECX No Growth <1000 cfu/mL 11/27/2016    URINECX No Growth <1000 cfu/mL 09/02/2016     Sputum Culture: No components found for: SPUTUMCX  Wound Culture: No results found for: WOUNDCULT    Last 24 Hours Medication List:     Current Facility-Administered Medications:  ALPRAZolam 2 mg Oral HS PRN MANAS Bernal   apixaban 5 mg Oral BID MANAS Bernal   aspirin 81 mg Oral QAM MANAS Bernal   azithromycin 500 mg Oral Q24H MANAS Bernal   benzonatate 200 mg Oral TID PRN MANAS Bernal   busPIRone 15 mg Oral BID MANAS Bernal   fenofibrate 160 mg Oral Daily MANAS Bernal   insulin glargine 45 Units Subcutaneous HS MANAS Bernal   insulin lispro 4-20 Units Subcutaneous 4x Daily (AC & HS) MANAS Bernal   insulin lispro 6 Units Subcutaneous TID With Meals MANAS Bernal   ipratropium 0 5 mg Nebulization 4x Daily MANAS Bernal   And       levalbuterol 1 25 mg Nebulization 4x Daily MANAS Bernal   methocarbamol 500 mg Oral HS PRN MANAS Bernal   methylPREDNISolone sodium succinate 40 mg Intravenous Q12H [facility] Phyllis Lan PA-C   metoprolol 5 mg Intravenous Q6H PRN MANAS Bernal   metoprolol tartrate 75 mg Oral Q12H [facility] MANAS Bernal   pantoprazole 40 mg Oral Early Morning MANAS Bernal   pregabalin 50 mg Oral TID MANAS Bernal   QUEtiapine 400 mg Oral HS MANAS Bernal   venlafaxine 150 mg Oral QAM MANAS Bernal        Today, Patient Was Seen By: Chloe Lucio MD    ** Please Note: Dragon 360 Dictation voice to text software may have been used in the creation of this document   **

## 2019-03-22 NOTE — PROGRESS NOTES
Progress Note - ICU Transfer to SD/MS alysa Stephenson 60 y o  male MRN: [id number]  [facility]   Unit/Bed#: ICU 02 Encounter: [id number]    Code Status: Level 1 - Full Code  POA:    POLST:      Reason for ICU admission: acute on chronic hypoxic respiratory failure 2/2 viral bronchitis and afib RVR    Active problems:   Principal Problem:    Acute on chronic respiratory failure with hypoxia (HCC)  Active Problems:    Chronic bronchitis with acute exacerbation (HCC)    Atrial fibrillation with RVR (HCC)    SHAVONNE (obstructive sleep apnea)    Esophageal reflux    Benign essential hypertension    Diabetes mellitus type 2, uncontrolled ([facility] )    CAD (coronary artery disease)    Bipolar affective disorder ([facility] )  Resolved Problems:    * No resolved hospital problems  *      Consultants:   Pulmonology    History of Present Illness:  Pt is a 61 yo male with PMH of COPD with chronic bronchitis, DM2, SHAVONNE, HTN, atrial fibriallation on eliquis who presented to ED 3/20 c/o SOB  Pt required bipap initially - and was transitioned to [facility] the following morning  Summary of clinical course:   * Acute on chronic respiratory failure with hypoxia (HCC)  Assessment & Plan  · Secondary to acute on chronic bronchitis exacerbation  · Continue nasal cannula  · Continue HS bipap  · Continue Zithromax, steroids, and duo nebs    Chronic bronchitis with acute exacerbation (HCC)  Assessment & Plan  · Possibly triggered by viral illness  · Procalcitonin negative  · Continue Zithromax, steroids, and breathing treatments  ·     Atrial fibrillation with RVR (HCC)  Assessment & Plan  · Likely exacerbated by COPD exacerbation  · Continue oral beta-blocker- increased to 75 mg metoprolol b i d   · P r n  Metoprolol 5 mg for heart rate greater than 120  · Continue Eliquis    SHAVONNE (obstructive sleep apnea)  Assessment & Plan  · Normally wear CPAP with 2 L at night    Patient states he is more comfortable on BiPAP  · Continue BiPAP HS    Benign essential hypertension  Assessment & Plan  · Continue metoprolol  · Lasix 40 mg x1    Esophageal reflux  Assessment & Plan  · Continue Protonix    Diabetes mellitus type 2, uncontrolled (HCC)  Assessment & Plan  Lab Results   Component Value Date    HGBA1C 8 4 (A) 02/18/2019       Recent Labs     03/20/19  2218 03/21/19  1120 03/21/19  1618   POCGLU 315* 285* 327*       Blood Sugar Average: Last 72 hrs:  (P) 309 continue Lantus 45 units at night   Currently on algorithm 5  Blood sugars remain greater than 180  Hyperglycemia likely related to steroid use  Add 10 units of Humalog with meals  Consider Deescalating after steroids discontinued  CAD (coronary artery disease)  Assessment & Plan  · Continue daily aspirin, beta-blocker    Bipolar affective disorder (HCC)  Assessment & Plan  · Continue Seroquel, Effexor, BuSpar, and Xanax as needed        Recent or scheduled procedures:   None    Outstanding/pending diagnostics:   Sputum culture-    Cultures:   Blood cultures  STrep pneumo - pending  Legionella - pending       Mobilization Plan:   OOB ad rosalva    Nutrition Plan:   Constant carb 2    VTE Pharmacologic Prophylaxis: Apixaban (Eliquis)  VTE Mechanical Prophylaxis: sequential compression device    Discharge Plan:   Patient should be ready for discharge to home after improvement in his respiratory status      Home medications that are not reordered and reason why:   [place]- pt stopped taking prior to admission  Vraylar - pt stopped taking prior to admission        Spoke with Bertin Martinez  regarding transfer  Portions of the record may have been created with voice recognition software  Occasional wrong word or "sound a like" substitutions may have occurred due to the inherent limitations of voice recognition software  Read the chart carefully and recognize, using context, where substitutions have occurred      Shalini Stoddard PA-C

## 2019-03-22 NOTE — PROGRESS NOTES
Report received from [facility] ICU RN  Patient arrived to floor via wheelchair, vitals done and patient placed on telemetry

## 2019-03-22 NOTE — ASSESSMENT & PLAN NOTE
Likely precipitated by viral bronchitis  Procalcitonin level was negative  Patient is on IV Solu-Medrol 40 milligram IV q 8 hours which will be tapered to IV q 12 hours  Continue azithromycin and nebulizers along with symptomatic treatment with [employer]

## 2019-03-22 NOTE — ASSESSMENT & PLAN NOTE
-chronically on 2 L NC at home  -clinically improved and returned to baseline  -likely 2/2 COPD exacerabation on top of baseline COPD and nocturnal hypoxemia and OAHS  -patient will also need to utilize 2 L oxygen with BiPAP at night

## 2019-03-22 NOTE — ASSESSMENT & PLAN NOTE
Likely secondary to COPD exacerbation  Continue metoprolol 75 milligram p o  B i d   Patient is on anticoagulation with Eliquis

## 2019-03-22 NOTE — PROGRESS NOTES
Progress Note - Pulmonary   Silviano Luis 60 y o  male MRN: [id number]  Unit/Bed#: [address] [place] [address] Encounter: [id number]      Assessment/Plan:     Chronic bronchitis with acute exacerbation (HCC)  Assessment & Plan  -pt w/ hx chronic cough and chronic bronchitis  -pt on trelegy chronically   -on ATC nebs available at home  -continue steroids, wean to Q 12 today    Esophageal reflux  Assessment & Plan  -continue PPI tx    SHAVONNE and COPD overlap syndrome ([address] )  Assessment & Plan  -patient is both chronically hypoxemic and on CPAP at home requiring 2 L nasal cannula oxygen / and is chronically on 10 cm of water CPAP  -patient also has chronic emphysematous disease  -w/ history of Severe restriction on spirometry w/ ratios of 70-72%  -FVC 41%  -FEV1 39%    * Acute on chronic respiratory failure with hypoxia (HCC)  Assessment & Plan  -chronically on 2 L NC at home  -clinically improved and returned to baseline  -likely 2/2 COPD exacerabation            ______________________________________________________________________    Subjective: Pt seen and examined at bedside  Pt is doing well  No complaints  Breathing significantly improved from yesterday  Still w/ some cough  Tele Events:     Vitals:   Temp:  [97 8 °F (36 6 °C)-98 1 °F (36 7 °C)] 97 9 °F (36 6 °C)  HR:  [] 95  Resp:  [17-33] 18  BP: (133-200)/() 156/88  FiO2 (%):  [40] 40  Weight (last 2 days)     Date/Time   Weight    03/22/19 0600   133 (293 21)    03/22/19 0500   133 (293 43)    03/21/19 2259   133 (293 43)    03/20/19 1818   134 (294 53)            Oxygen Therapy  SpO2: 97 %  O2 Flow Rate (L/min): 2 L/min    Pt Tolerated Bipap 12/5/40%    IV Infusions:       Nutrition:        Diet Orders   (From admission, onward)            Start     Ordered    03/22/19 1226  Room Service  Once     Question:  Type of Service  Answer:  Room Service-Appropriate    03/22/19 1225    03/20/19 1945  Diet Campos/CHO Controlled; Consistent Carbohydrate Diet Level 2 (5 carb servings/75 grams CHO/meal)  Diet effective now     Question Answer Comment   Diet Type Campos/CHO Controlled    Campos/CHO Controlled Consistent Carbohydrate Diet Level 2 (5 carb servings/75 grams CHO/meal)    RD to adjust diet per protocol? No        03/20/19 1944          Ins/Outs:   I/O       03/20 0701 - 03/21 0700 03/21 0701 - 03/22 0700 03/22 0701 - 03/23 0700    P  O  480 930 480    Total Intake(mL/kg) 480 (3 6) 930 (7) 480 (3 6)    Urine (mL/kg/hr) 900 2900 (0 9) 1900 (1 9)    Total Output 900 2900 1900    Net -420 -[phone] -9454                 Lines/Drains:  Invasive Devices     Peripheral Intravenous Line            Peripheral IV 03/20/19 Left Antecubital 1 day                 Active medications:  Scheduled Meds:  Current Facility-Administered Medications:  ALPRAZolam 2 mg Oral HS PRN Kofi Pancoast, CRNP   apixaban 5 mg Oral BID Kofi Pancoast, CRNP   aspirin 81 mg Oral QAM Kofi Pancoast, CRNP   azithromycin 500 mg Oral Q24H Kofi Pancoast, CRNP   benzonatate 200 mg Oral TID PRN Kofi Pancoast, CRNP   busPIRone 15 mg Oral BID Kofi Pancoast, CRNP   fenofibrate 160 mg Oral Daily Kofi Pancoast, CRNP   insulin glargine 45 Units Subcutaneous HS Kofi Pancoast, CRNP   insulin lispro 4-20 Units Subcutaneous 4x Daily (AC & HS) Kofi Pancoast, CRNP   insulin lispro 6 Units Subcutaneous TID With Meals Kofi Pancoast, CRNP   ipratropium 0 5 mg Nebulization 4x Daily Kofi Pancoast, CRNP   And       levalbuterol 1 25 mg Nebulization 4x Daily Kofi Pancoast, CRNP   methocarbamol 500 mg Oral HS PRN Kofi Pancoast, CRNP   methylPREDNISolone sodium succinate 40 mg Intravenous Q12H [address], [place]-   metoprolol 5 mg Intravenous Q6H PRN Kofi Pancoast, CRNP   metoprolol tartrate 75 mg Oral Q12H [address] Kofi Pancoast, CRNP   pantoprazole 40 mg Oral Early Morning Kofi Pancoast, CRNP   pregabalin 50 mg Oral TID MANAS [place]   QUEtiapine 400 mg Oral HS MANAS [place]   venlafaxine 150 mg Oral QAM Rachel Shoe, CRNP     PRN Meds:  ALPRAZolam 2 mg HS PRN   benzonatate 200 mg TID PRN   methocarbamol 500 mg HS PRN   metoprolol 5 mg Q6H PRN     ____________________________________________________________________      Physical Exam   Constitutional: He is oriented to person, place, and time  He appears well-developed  No distress  Morbidly obese body habitus   HENT:   Head: Normocephalic and atraumatic  Eyes: Pupils are equal, round, and reactive to light  No scleral icterus  Neck: Normal range of motion  No tracheal deviation present  Large neck circumference   Cardiovascular: Normal rate, regular rhythm, normal heart sounds and intact distal pulses  Exam reveals no gallop and no friction rub  No murmur heard  Pulmonary/Chest: No accessory muscle usage or stridor  No tachypnea  No respiratory distress  He has decreased breath sounds in the right lower field and the left lower field  He has no wheezes  He has no rhonchi  He has no rales  He exhibits no tenderness  Abdominal: Soft  He exhibits no distension  There is no tenderness  There is no rebound and no guarding  Severely obese and protuberant abdomen   Musculoskeletal: Normal range of motion  He exhibits no edema  Right lower leg: He exhibits no edema  Left lower leg: He exhibits no edema  Neurological: He is alert and oriented to person, place, and time  Skin: Skin is warm and dry     Psychiatric: He has a normal mood and affect            ____________________________________________________________________    Labs:   CBC: Results from last 7 days   Lab Units 03/22/19  0540 03/21/19  0550 03/20/19  1445   WBC Thousand/uL 13 10* 11 27* 9 83   HEMOGLOBIN g/dL 14 1 14 1 14 7   HEMATOCRIT % 43 7 43 9 44 2   MCV fL 92 93 91   PLATELETS Thousands/uL 202 199 198     CMP: Results from last 7 days   Lab Units 03/22/19  0540 03/21/19  0550 03/20/19  1445   POTASSIUM mmol/L 4 5 4 6 4 0   CHLORIDE mmol/L 98* 102 101   CO2 mmol/L 29 26 29   BUN mg/dL 28* 29* 18   CREATININE mg/dL 1 09 1 02 1 03   CALCIUM mg/dL 9 7 9 4 9 4   AST U/L 11  --  14   ALT U/L 32  --  35   ALK PHOS U/L 48  --  72   EGFR ml/min/1 73sq m 74 80 79     No components found for: ABG    Magnesium:   Results from last 7 days   Lab Units 03/22/19  0540   MAGNESIUM mg/dL 2 1     Phosphorous:   Results from last 7 days   Lab Units 03/21/19  0550   PHOSPHORUS mg/dL 3 5     Troponin:   Results from last 7 days   Lab Units 03/20/19  2049 03/20/19  1445   TROPONIN I ng/mL 0 02 0 03     PT/INR:     Lactic Acid:     BNP:   Results from last 7 days   Lab Units 03/20/19  1445   NT-PRO BNP pg/mL 855*     TSH:     Procalcitonin: Invalid input(s): PROCALCITONIN      Imaging:   XR chest portable- ICU   Final Result by Kisha Crowder MD (03/21 1014)      Enlargement of cardiac silhouette  Somewhat prominent pulmonary vasculature, correlate for clinical concern of pulmonary congestion      No acute focal infiltrate is seen            Workstation performed: [id number]         XR chest 1 view portable   Final Result by Stephan Bolaños MD (03/20 1513)      No acute cardiopulmonary disease  Workstation performed: [id number]               Micro: Lab Results   Component Value Date    BLOODCX No Growth After 5 Days  11/16/2018    BLOODCX Staphylococcus coagulase negative (A) 11/16/2018    BLOODCX No Growth After 5 Days  10/31/2018    URINECX 7611-7971 cfu/ml Mixed Contaminants X2 03/20/2017    URINECX No Growth <1000 cfu/mL 11/27/2016    URINECX No Growth <1000 cfu/mL 09/02/2016    SPUTUMCULTUR Test not performed  Suggest repeat specimen  03/21/2019    SPUTUMCULTUR Test not performed  Suggest repeat specimen   11/08/2017    SPUTUMCULTUR 1+ Growth of Staphylococcus aureus 03/20/2017    SPUTUMCULTUR 1+ Growth of Mixed Respiratory Francine 03/20/2017    MRSACULTURE  03/20/2019     No Methicillin Resistant Staphlyococcus aureus (MRSA) isolated            Invalid input(s): LEGIONELLAURINARYANTIGEN        Code Status: Level 1 - Full Code

## 2019-03-23 LAB
ARTERIAL PATENCY WRIST A: YES
BASE EXCESS BLDA CALC-SCNC: 3.5 MMOL/L
BODY TEMPERATURE: 97 DEGREES FEHRENHEIT
GLUCOSE SERPL-MCNC: 209 MG/DL (ref 65–140)
GLUCOSE SERPL-MCNC: 260 MG/DL (ref 65–140)
GLUCOSE SERPL-MCNC: 264 MG/DL (ref 65–140)
GLUCOSE SERPL-MCNC: 268 MG/DL (ref 65–140)
HCO3 BLDA-SCNC: 28.8 MMOL/L (ref 22–28)
IPAP: 12
NON VENT- BIPAP: ABNORMAL
O2 CT BLDA-SCNC: 19.6 ML/DL (ref 16–23)
OXYHGB MFR BLDA: 96.5 % (ref 94–97)
PCO2 BLDA: 46.4 MM HG (ref 36–44)
PCO2 TEMP ADJ BLDA: 44.6 MM HG (ref 36–44)
PEEP MAX SETTING VENT: 5 CM[H2O]
PH BLD: 7.42 [PH] (ref 7.35–7.45)
PH BLDA: 7.41 [PH] (ref 7.35–7.45)
PO2 BLD: 114.1 MM HG (ref 75–129)
PO2 BLDA: 119.6 MM HG (ref 75–129)
SPECIMEN SOURCE: ABNORMAL
VENT BIPAP FIO2: 40 %

## 2019-03-23 PROCEDURE — 82948 REAGENT STRIP/BLOOD GLUCOSE: CPT

## 2019-03-23 PROCEDURE — 82805 BLOOD GASES W/O2 SATURATION: CPT | Performed by: PHYSICIAN ASSISTANT

## 2019-03-23 PROCEDURE — 94760 N-INVAS EAR/PLS OXIMETRY 1: CPT

## 2019-03-23 PROCEDURE — 99232 SBSQ HOSP IP/OBS MODERATE 35: CPT | Performed by: INTERNAL MEDICINE

## 2019-03-23 PROCEDURE — 99222 1ST HOSP IP/OBS MODERATE 55: CPT | Performed by: INTERNAL MEDICINE

## 2019-03-23 PROCEDURE — 94660 CPAP INITIATION&MGMT: CPT

## 2019-03-23 PROCEDURE — 94640 AIRWAY INHALATION TREATMENT: CPT

## 2019-03-23 PROCEDURE — 36600 WITHDRAWAL OF ARTERIAL BLOOD: CPT

## 2019-03-23 RX ORDER — INSULIN GLARGINE 100 [IU]/ML
48 INJECTION, SOLUTION SUBCUTANEOUS
Status: DISCONTINUED | OUTPATIENT
Start: 2019-03-23 | End: 2019-03-24

## 2019-03-23 RX ORDER — METOPROLOL TARTRATE 100 MG/1
100 TABLET ORAL EVERY 12 HOURS SCHEDULED
Status: DISCONTINUED | OUTPATIENT
Start: 2019-03-23 | End: 2019-03-26 | Stop reason: HOSPADM

## 2019-03-23 RX ORDER — METHYLPREDNISOLONE SODIUM SUCCINATE 40 MG/ML
20 INJECTION, POWDER, LYOPHILIZED, FOR SOLUTION INTRAMUSCULAR; INTRAVENOUS EVERY 8 HOURS SCHEDULED
Status: DISCONTINUED | OUTPATIENT
Start: 2019-03-23 | End: 2019-03-25

## 2019-03-23 RX ADMIN — PREGABALIN 50 MG: 50 CAPSULE ORAL at 21:21

## 2019-03-23 RX ADMIN — IPRATROPIUM BROMIDE 0.5 MG: 0.5 SOLUTION RESPIRATORY (INHALATION) at 07:20

## 2019-03-23 RX ADMIN — ASPIRIN 81 MG 81 MG: 81 TABLET ORAL at 09:17

## 2019-03-23 RX ADMIN — INSULIN LISPRO 8 UNITS: 100 INJECTION, SOLUTION INTRAVENOUS; SUBCUTANEOUS at 21:28

## 2019-03-23 RX ADMIN — DILTIAZEM HYDROCHLORIDE 30 MG: 30 TABLET, FILM COATED ORAL at 15:33

## 2019-03-23 RX ADMIN — METOPROLOL TARTRATE 75 MG: 50 TABLET, FILM COATED ORAL at 09:17

## 2019-03-23 RX ADMIN — APIXABAN 5 MG: 5 TABLET, FILM COATED ORAL at 09:17

## 2019-03-23 RX ADMIN — METHYLPREDNISOLONE SODIUM SUCCINATE 20 MG: 40 INJECTION, POWDER, FOR SOLUTION INTRAMUSCULAR; INTRAVENOUS at 15:33

## 2019-03-23 RX ADMIN — INSULIN LISPRO 6 UNITS: 100 INJECTION, SOLUTION INTRAVENOUS; SUBCUTANEOUS at 17:18

## 2019-03-23 RX ADMIN — ALPRAZOLAM 2 MG: 0.5 TABLET ORAL at 21:24

## 2019-03-23 RX ADMIN — METHYLPREDNISOLONE SODIUM SUCCINATE 20 MG: 40 INJECTION, POWDER, FOR SOLUTION INTRAMUSCULAR; INTRAVENOUS at 21:21

## 2019-03-23 RX ADMIN — IPRATROPIUM BROMIDE 0.5 MG: 0.5 SOLUTION RESPIRATORY (INHALATION) at 18:39

## 2019-03-23 RX ADMIN — LEVALBUTEROL HYDROCHLORIDE 1.25 MG: 1.25 SOLUTION, CONCENTRATE RESPIRATORY (INHALATION) at 07:20

## 2019-03-23 RX ADMIN — PREGABALIN 50 MG: 50 CAPSULE ORAL at 15:33

## 2019-03-23 RX ADMIN — INSULIN LISPRO 6 UNITS: 100 INJECTION, SOLUTION INTRAVENOUS; SUBCUTANEOUS at 09:18

## 2019-03-23 RX ADMIN — PREGABALIN 50 MG: 50 CAPSULE ORAL at 09:17

## 2019-03-23 RX ADMIN — METHYLPREDNISOLONE SODIUM SUCCINATE 40 MG: 40 INJECTION, POWDER, FOR SOLUTION INTRAMUSCULAR; INTRAVENOUS at 09:17

## 2019-03-23 RX ADMIN — LEVALBUTEROL HYDROCHLORIDE 1.25 MG: 1.25 SOLUTION, CONCENTRATE RESPIRATORY (INHALATION) at 15:12

## 2019-03-23 RX ADMIN — LEVALBUTEROL HYDROCHLORIDE 1.25 MG: 1.25 SOLUTION, CONCENTRATE RESPIRATORY (INHALATION) at 18:39

## 2019-03-23 RX ADMIN — BUSPIRONE HYDROCHLORIDE 15 MG: 10 TABLET ORAL at 17:17

## 2019-03-23 RX ADMIN — VENLAFAXINE HYDROCHLORIDE 150 MG: 150 CAPSULE, EXTENDED RELEASE ORAL at 09:17

## 2019-03-23 RX ADMIN — AZITHROMYCIN MONOHYDRATE 500 MG: 250 TABLET ORAL at 17:17

## 2019-03-23 RX ADMIN — INSULIN GLARGINE 48 UNITS: 100 INJECTION, SOLUTION SUBCUTANEOUS at 21:29

## 2019-03-23 RX ADMIN — INSULIN LISPRO 4 UNITS: 100 INJECTION, SOLUTION INTRAVENOUS; SUBCUTANEOUS at 09:19

## 2019-03-23 RX ADMIN — BUSPIRONE HYDROCHLORIDE 15 MG: 10 TABLET ORAL at 09:16

## 2019-03-23 RX ADMIN — IPRATROPIUM BROMIDE 0.5 MG: 0.5 SOLUTION RESPIRATORY (INHALATION) at 15:12

## 2019-03-23 RX ADMIN — APIXABAN 5 MG: 5 TABLET, FILM COATED ORAL at 17:17

## 2019-03-23 RX ADMIN — DILTIAZEM HYDROCHLORIDE 30 MG: 30 TABLET, FILM COATED ORAL at 21:21

## 2019-03-23 RX ADMIN — LEVALBUTEROL HYDROCHLORIDE 1.25 MG: 1.25 SOLUTION, CONCENTRATE RESPIRATORY (INHALATION) at 11:14

## 2019-03-23 RX ADMIN — PANTOPRAZOLE SODIUM 40 MG: 40 TABLET, DELAYED RELEASE ORAL at 05:36

## 2019-03-23 RX ADMIN — METOPROLOL TARTRATE 100 MG: 100 TABLET, FILM COATED ORAL at 21:20

## 2019-03-23 RX ADMIN — INSULIN LISPRO 8 UNITS: 100 INJECTION, SOLUTION INTRAVENOUS; SUBCUTANEOUS at 17:16

## 2019-03-23 RX ADMIN — QUETIAPINE FUMARATE 400 MG: 200 TABLET, EXTENDED RELEASE ORAL at 21:24

## 2019-03-23 RX ADMIN — IPRATROPIUM BROMIDE 0.5 MG: 0.5 SOLUTION RESPIRATORY (INHALATION) at 11:14

## 2019-03-23 RX ADMIN — INSULIN LISPRO 8 UNITS: 100 INJECTION, SOLUTION INTRAVENOUS; SUBCUTANEOUS at 12:00

## 2019-03-23 RX ADMIN — INSULIN LISPRO 6 UNITS: 100 INJECTION, SOLUTION INTRAVENOUS; SUBCUTANEOUS at 12:00

## 2019-03-23 NOTE — ASSESSMENT & PLAN NOTE
Lab Results   Component Value Date    HGBA1C 8 4 (A) 02/18/2019       Recent Labs     03/22/19  1936 03/23/19  0712 03/23/19  1121 03/23/19  1633   POCGLU 350* 209* 264* 268*       Blood Sugar Average: Last 72 hrs:  (P) 100 [id number]   Uncontrolled blood sugar secondary to steroids  Will increase Lantus to 48 units subcutaneously daily and Humalog with each meal to 8 units

## 2019-03-23 NOTE — PROGRESS NOTES
Progress Note Pennie Mejía 1960, 60 y o  male MRN: [id number]    Unit/Bed#: [address] [place] [address] Encounter: [id number]    Primary Care Provider: Rachael Zhu MD   Date and time admitted to hospital: 3/20/2019  2:29 PM        * Acute on chronic respiratory failure with hypoxia [facility])  Assessment & Plan  Resolved  Patient needed BiPAP initially and was admitted to step-down unit  Likely secondary COPD exacerbation  Patient is currently on 2 liters of oxygen which he chronically uses even at home    Chronic bronchitis with acute exacerbation ([facility] )  Assessment & Plan  Likely precipitated by viral bronchitis  Procalcitonin level was negative  Patient was on Solu-Medrol 40 milligram IV q 12 hours which was tapered to 20 milligram IV q 8 hours  Continue azithromycin for 5 days and nebulizers along with symptomatic treatment with Tessalon Perles    Atrial fibrillation with RVR ([facility] )  Assessment & Plan  Likely secondary to COPD exacerbation  Patient's heart rate continued to remain uncontrolled with heart rate ranging anywhere from   Cardiology consult appreciated  Metoprolol dose was increased to 100 milligram p  O  B i d  And patient was added on Cardizem   Patient is on anticoagulation with Eliquis    SHAVONNE and COPD overlap syndrome (HCC)  Assessment & Plan  Continue 2 liters oxygen during the day and patient currently is on BiPAP q h s  Which will be continued    Diabetes mellitus type 2, uncontrolled [facility])  Assessment & Plan  Lab Results   Component Value Date    HGBA1C 8 4 (A) 02/18/2019       Recent Labs     03/22/19  1936 03/23/19  0712 03/23/19  1121 03/23/19  1633   POCGLU 350* 209* 264* 268*       Blood Sugar Average: Last 72 hrs:  (P) 084 [id number]   Uncontrolled blood sugar secondary to steroids  Will increase Lantus to 48 units subcutaneously daily and Humalog with each meal to 8 units      Esophageal reflux  Assessment & Plan  Continue Protonix    CAD (coronary artery disease)  Assessment & Plan  Continue aspirin, metoprolol and statin    Bipolar affective disorder (HCC)  Assessment & Plan  Continue Seroquel, Xanax, Effexor and BuSpar          VTE Pharmacologic Prophylaxis:   Pharmacologic: Apixaban (Eliquis)  Mechanical VTE Prophylaxis in Place: Yes    Patient Centered Rounds: I have performed bedside rounds with nursing staff today  Discussions with Specialists or Other Care Team Provider: Yes Eileen   Education and Discussions with Family / Patient:Yes  Time Spent for Care: 30 minutes  More than 50% of total time spent on counseling and coordination of care as described above  Current Length of Stay: 3 day(s)  Current Patient Status: Inpatient     Discharge Plan: Home    Code Status: Level 1 - Full Code      Subjective:   Patient is feeling slightly better  Improved shortness of of breath  Still having some cough  Objective:     Vitals:   Temp (24hrs), Av 6 °F (36 4 °C), Min:97 °F (36 1 °C), Max:98 2 °F (36 8 °C)    Temp:  [97 °F (36 1 °C)-98 2 °F (36 8 °C)] (P) 98 2 °F (36 8 °C)  HR:  [] (P) 104  Resp:  [19-24] (P) 22  BP: (111-175)/(72-91) (P) 156/103  SpO2:  [95 %-98 %] (P) 97 %  Body mass index is 40 8 kg/m²  Input and Output Summary (last 24 hours): Intake/Output Summary (Last 24 hours) at 3/23/2019 1651  Last data filed at 3/23/2019 1536  Gross per 24 hour   Intake 840 ml   Output 2550 ml   Net -1710 ml        Physical Exam:     Physical Exam   Constitutional: No distress  HENT:   Head: Normocephalic and atraumatic  Eyes: Pupils are equal, round, and reactive to light  Conjunctivae are normal    Neck: Normal range of motion  Neck supple  Cardiovascular: Normal rate and normal heart sounds  Irregularly irregular   Pulmonary/Chest: Effort normal  No respiratory distress  He has no wheezes  He has no rhonchi  He has no rales  He exhibits no tenderness  Decreased breath sounds   Abdominal: Soft  Bowel sounds are normal  He exhibits no distension   There is no tenderness  There is no rebound and no guarding  Musculoskeletal: He exhibits edema  Trace edema   Neurological: He is alert  No cranial nerve deficit  Skin: Skin is warm and dry  No rash noted  Additional Data:     Labs:    Results from last 7 days   Lab Units 03/22/19  0540 03/21/19  0550 03/20/19  1445   WBC Thousand/uL 13 10* 11 27* 9 83   HEMOGLOBIN g/dL 14 1 14 1 14 7   HEMATOCRIT % 43 7 43 9 44 2   PLATELETS Thousands/uL 202 199 198   NEUTROS PCT % 76* 73 61     Results from last 7 days   Lab Units 03/22/19  0540 03/21/19  0550 03/20/19  1445   SODIUM mmol/L 137 138 138   POTASSIUM mmol/L 4 5 4 6 4 0   CHLORIDE mmol/L 98* 102 101   CO2 mmol/L 29 26 29   BUN mg/dL 28* 29* 18   CREATININE mg/dL 1 09 1 02 1 03   CALCIUM mg/dL 9 7 9 4 9 4   TOTAL BILIRUBIN mg/dL 0 40  --  0 30   ALK PHOS U/L 48  --  72   ALT U/L 32  --  35   AST U/L 11  --  14         Results from last 7 days   Lab Units 03/20/19  2049 03/20/19  1445   TROPONIN I ng/mL 0 02 0 03     Lab Results   Component Value Date/Time    HGBA1C 8 4 (A) 02/18/2019 11:38 AM    HGBA1C 7 6 (H) 10/29/2018 08:01 AM    HGBA1C 8 0 07/21/2017 09:27 AM     Results from last 7 days   Lab Units 03/23/19  1633 03/23/19  1121 03/23/19  0712 03/22/19  1936 03/22/19  1647 03/22/19  1108 03/22/19  0707 03/21/19  2205 03/21/19  2136 03/21/19  1618 03/21/19  1120 03/20/19  2218   POC GLUCOSE mg/dl 268* 264* 209* 350* 295* 334* 226* 306* 314* 327* 285* 315*     Results from last 7 days   Lab Units 03/22/19  0540 03/20/19  1751   PROCALCITONIN ng/ml <0 05 <0 05       * I Have Reviewed All Lab Data Listed Above  * Additional Pertinent Lab Tests Reviewed: [address] Admission Reviewed    Imaging:     XR chest portable- ICU   Final Result by Landon Carlin MD (03/21 1014)      Enlargement of cardiac silhouette    Somewhat prominent pulmonary vasculature, correlate for clinical concern of pulmonary congestion      No acute focal infiltrate is seen Workstation performed: [id number]         XR chest 1 view portable   Final Result by Geoffrey Hernandes MD (03/20 6053)      No acute cardiopulmonary disease  Workstation performed: [id number]           Imaging Reports Reviewed by myself    Cultures:   Blood Culture:   Lab Results   Component Value Date    BLOODCX No Growth After 5 Days  11/16/2018    BLOODCX Staphylococcus coagulase negative (A) 11/16/2018    BLOODCX No Growth After 5 Days  10/31/2018    BLOODCX No Growth After 5 Days  10/31/2018    BLOODCX No Growth After 5 Days  09/06/2017    BLOODCX No Growth After 5 Days   09/06/2017     Urine Culture:   Lab Results   Component Value Date    URINECX 9658-3971 cfu/ml Mixed Contaminants X2 03/20/2017    URINECX No Growth <1000 cfu/mL 11/27/2016    URINECX No Growth <1000 cfu/mL 09/02/2016     Sputum Culture: No components found for: SPUTUMCX  Wound Culture: No results found for: WOUNDCULT    Last 24 Hours Medication List:     Current Facility-Administered Medications:  ALPRAZolam 2 mg Oral HS PRN MANAS Bernal   apixaban 5 mg Oral BID MANAS Bernal   aspirin 81 mg Oral QAM MANAS Bernal   azithromycin 500 mg Oral Q24H MANAS Bernal   benzonatate 200 mg Oral TID PRN MANAS Bernal   busPIRone 15 mg Oral BID MANAS Bernal   diltiazem 30 mg Oral Q8H [address] Yobany Roland MD   fenofibrate 160 mg Oral Daily MANAS Bernal   insulin glargine 48 Units Subcutaneous HS Chloe Lucio MD   insulin lispro 4-20 Units Subcutaneous 4x Daily (AC & HS) MANAS Bernal   [START ON 3/24/2019] insulin lispro 8 Units Subcutaneous TID With Meals Chloe Lucio MD   ipratropium 0 5 mg Nebulization 4x Daily MANAS Bernal   And       levalbuterol 1 25 mg Nebulization 4x Daily MANAS Bernal   methocarbamol 500 mg Oral HS PRN MANAS Bernal   methylPREDNISolone sodium succinate 20 mg Intravenous Q8H [address] Chloe Lucio MD   metoprolol 5 mg Intravenous Q6H PRN MANAS Meyrs   metoprolol tartrate 100 mg Oral Q12H [address] Asia Mcgrath MD   pantoprazole 40 mg Oral Early Morning MANAS Myers   pregabalin 50 mg Oral TID MANAS Myers   QUEtiapine 400 mg Oral HS MANAS Myers   venlafaxine 150 mg Oral QAM MANAS Myers        Today, Patient Was Seen By: Adan Uriarte MD    ** Please Note: Dragon 360 Dictation voice to text software may have been used in the creation of this document   **

## 2019-03-23 NOTE — PLAN OF CARE
Problem: RESPIRATORY - ADULT  Goal: Achieves optimal ventilation and oxygenation  Description  INTERVENTIONS:  - Assess for changes in respiratory status  - Assess for changes in mentation and behavior  - Position to facilitate oxygenation and minimize respiratory effort  - Oxygen administration by appropriate delivery method based on oxygen saturation (per order) or ABGs  - Initiate smoking cessation education as indicated  - Encourage broncho-pulmonary hygiene including cough, deep breathe, Incentive Spirometry  - Assess the need for suctioning and aspirate as needed  - Assess and instruct to report SOB or any respiratory difficulty  - Respiratory Therapy support as indicated  Outcome: Progressing     Problem: PAIN - ADULT  Goal: Verbalizes/displays adequate comfort level or baseline comfort level  Description  Interventions:  - Encourage patient to monitor pain and request assistance  - Assess pain using appropriate pain scale  - Administer analgesics based on type and severity of pain and evaluate response  - Implement non-pharmacological measures as appropriate and evaluate response  - Consider cultural and social influences on pain and pain management  - Notify physician/advanced practitioner if interventions unsuccessful or patient reports new pain  Outcome: Progressing     Problem: INFECTION - ADULT  Goal: Absence or prevention of progression during hospitalization  Description  INTERVENTIONS:  - Assess and monitor for signs and symptoms of infection  - Monitor lab/diagnostic results  - Monitor all insertion sites, i e  indwelling lines, tubes, and drains  - Monitor endotracheal (as able) and nasal secretions for changes in amount and color  - [place] appropriate cooling/warming therapies per order  - Administer medications as ordered  - Instruct and encourage patient and family to use good hand hygiene technique  - Identify and instruct in appropriate isolation precautions for identified infection/condition  Outcome: Progressing     Problem: SAFETY ADULT  Goal: Patient will remain free of falls  Description  INTERVENTIONS:  - Assess patient frequently for physical needs  -  Identify cognitive and physical deficits and behaviors that affect risk of falls  -  [place] fall precautions as indicated by assessment   - Educate patient/family on patient safety including physical limitations  - Instruct patient to call for assistance with activity based on assessment  - Modify environment to reduce risk of injury  - Consider OT/PT consult to assist with strengthening/mobility  Outcome: Progressing     Problem: DISCHARGE PLANNING  Goal: Discharge to home or other facility with appropriate resources  Description  INTERVENTIONS:  - Identify barriers to discharge w/patient and caregiver  - Arrange for needed discharge resources and transportation as appropriate  - Identify discharge learning needs (meds, wound care, etc )  - Arrange for interpretive services to assist at discharge as needed  - Refer to Case Management Department for coordinating discharge planning if the patient needs post-hospital services based on physician/advanced practitioner order or complex needs related to functional status, cognitive ability, or social support system  Outcome: Progressing     Problem: Knowledge Deficit  Goal: Patient/family/caregiver demonstrates understanding of disease process, treatment plan, medications, and discharge instructions  Description  Complete learning assessment and assess knowledge base    Interventions:  - Provide teaching at level of understanding  - Provide teaching via preferred learning methods  Outcome: Progressing     Problem: CARDIOVASCULAR - ADULT  Goal: Maintains optimal cardiac output and hemodynamic stability  Description  INTERVENTIONS:  - Monitor I/O, vital signs and rhythm  - Monitor for S/S and trends of decreased cardiac output i e  bleeding, hypotension  - Administer and titrate ordered vasoactive medications to optimize hemodynamic stability  - Assess quality of pulses, skin color and temperature  - Assess for signs of decreased coronary artery perfusion - ex  Angina  - Instruct patient to report change in severity of symptoms  Outcome: Progressing  Goal: Absence of cardiac dysrhythmias or at baseline rhythm  Description  INTERVENTIONS:  - Continuous cardiac monitoring, monitor vital signs, obtain 12 lead EKG if indicated  - Administer antiarrhythmic and heart rate control medications as ordered  - Monitor electrolytes and administer replacement therapy as ordered  Outcome: Progressing     Problem: Potential for Falls  Goal: Patient will remain free of falls  Description  INTERVENTIONS:  - Assess patient frequently for physical needs  -  Identify cognitive and physical deficits and behaviors that affect risk of falls    -  [place] fall precautions as indicated by assessment   - Educate patient/family on patient safety including physical limitations  - Instruct patient to call for assistance with activity based on assessment  - Modify environment to reduce risk of injury  - Consider OT/PT consult to assist with strengthening/mobility  Outcome: Progressing     Problem: Prexisting or High Potential for Compromised Skin Integrity  Goal: Skin integrity is maintained or improved  Description  INTERVENTIONS:  - Identify patients at risk for skin breakdown  - Assess and monitor skin integrity  - Assess and monitor nutrition and hydration status  - Monitor labs (i e  albumin)  - Assess for incontinence   - Turn and reposition patient  - Assist with mobility/ambulation  - Relieve pressure over bony prominences  - Avoid friction and shearing  - Provide appropriate hygiene as needed including keeping skin clean and dry  - Evaluate need for skin moisturizer/barrier cream  - Collaborate with interdisciplinary team (i e  Nutrition, Rehabilitation, etc )   - Patient/family teaching  Outcome: Progressing     Problem: DISCHARGE PLANNING - CARE MANAGEMENT  Goal: Discharge to post-acute care or home with appropriate resources  Description  INTERVENTIONS:  - Conduct assessment to determine patient/family and health care team treatment goals, and need for post-acute services based on payer coverage, community resources, and patient preferences, and barriers to discharge  - Address psychosocial, clinical, and financial barriers to discharge as identified in assessment in conjunction with the patient/family and health care team  - Arrange appropriate level of post-acute services according to patient's   needs and preference and payer coverage in collaboration with the physician and health care team  - Communicate with and update the patient/family, physician, and health care team regarding progress on the discharge plan  - Arrange appropriate transportation to post-acute venues    Patient is independent  May need Bipap upon discharge  SW will follow for needs     Outcome: Progressing     Problem: SAFETY ADULT  Goal: Maintain or return to baseline ADL function  Description  INTERVENTIONS:  -  Assess patient's ability to carry out ADLs; assess patient's baseline for ADL function and identify physical deficits which impact ability to perform ADLs (bathing, care of mouth/teeth, toileting, grooming, dressing, etc )  - Assess/evaluate cause of self-care deficits   - Assess range of motion  - Assess patient's mobility; develop plan if impaired  - Assess patient's need for assistive devices and provide as appropriate  - Encourage maximum independence but intervene and supervise when necessary  ¯ Involve family in performance of ADLs  ¯ Assess for home care needs following discharge   ¯ Request OT consult to assist with ADL evaluation and planning for discharge  ¯ Provide patient education as appropriate  Outcome: Adequate for Discharge  Goal: Maintain or return mobility status to optimal level  Description  INTERVENTIONS:  - Assess patient's baseline mobility status (ambulation, transfers, stairs, etc )    - Identify cognitive and physical deficits and behaviors that affect mobility  - Identify mobility aids required to assist with transfers and/or ambulation (gait belt, sit-to-stand, lift, walker, cane, etc )  - [place] fall precautions as indicated by assessment  - Record patient progress and toleration of activity level on Mobility SBAR; progress patient to next Phase/Stage  - Instruct patient to call for assistance with activity based on assessment  - Request Rehabilitation consult to assist with strengthening/weightbearing, etc   Outcome: Adequate for Discharge     Problem: INFECTION - ADULT  Goal: Absence of fever/infection during neutropenic period  Description  INTERVENTIONS:  - Monitor WBC  - Implement neutropenic guidelines  Outcome: Completed     Problem: METABOLIC, FLUID AND ELECTROLYTES - ADULT  Goal: Electrolytes maintained within normal limits  Description  INTERVENTIONS:  - Monitor labs and assess patient for signs and symptoms of electrolyte imbalances  - Administer electrolyte replacement as ordered  - Monitor response to electrolyte replacements, including repeat lab results as appropriate  - Instruct patient on fluid and nutrition as appropriate  Outcome: Completed  Goal: Fluid balance maintained  Description  INTERVENTIONS:  - Monitor labs and assess for signs and symptoms of volume excess or deficit  - Monitor I/O and WT  - Instruct patient on fluid and nutrition as appropriate  Outcome: Completed

## 2019-03-23 NOTE — ASSESSMENT & PLAN NOTE
Likely secondary to COPD exacerbation  Patient's heart rate continued to remain uncontrolled with heart rate ranging anywhere from   Cardiology consult appreciated  Metoprolol dose was increased to 100 milligram p  O  B i d   And patient was added on Cardizem   Patient is on anticoagulation with Eliquis

## 2019-03-23 NOTE — PROGRESS NOTES
Progress Note - Pulmonary   Mitchell Parks 60 y o  male MRN: [id number]  Unit/Bed#: [address] [place] [address] Encounter: [id number]    Assessment:  Acute exacerbation chronic bronchitis improving  Restrictive respiratory impairment secondary to obesity  SHAVONNE  Atrial fibrillation  Heart rate still running 100-100 10 beats per minute  Acute on chronic hypoxemic respiratory secondary to COPD exacerbation and obesity alveolar hypoventilation  Patient's breathing has improved    Plan:  Would continue IV Solu-Medrol 40 mg every 12 hours  Could consider decreasing dose tomorrow  Will continue BiPAP 12/5 with 40% oxygen at bedtime  At home patient uses CPAP nightly 2 L of oxygen at bedtime  I can switch is machine to auto BiPAP when he goes home and we can monitor his compliance formation after see if this is effective for his SHAVONNE  He prefers BiPAP over CPAP  He does have mild SHAVONNE  Continue levalbuterol 1 25 mg with Atrovent 0 5 mg q i d  Azithromycin Day #3 - continue 5 days      Subjective:   Cough is better  He has been doing well on BiPAP at night  Less short of breath    Objective:     Vitals: Blood pressure 137/91, pulse 99, temperature 97 6 °F (36 4 °C), temperature source Tympanic, resp  rate 20, height 5' 11" (1 803 m), weight 133 kg (292 lb 8 8 oz), SpO2 95 %  ,Body mass index is 40 8 kg/m²  Intake/Output Summary (Last 24 hours) at 3/23/2019 1457  Last data filed at 3/23/2019 1155  Gross per 24 hour   Intake 120 ml   Output 2950 ml   Net -2830 ml       Physical Exam: Physical Exam   Constitutional: He is oriented to person, place, and time  He appears well-developed and well-nourished  No distress  Patient is awake alert  On 2 L O2 saturation is 98%   HENT:   Head: Normocephalic  Nose: Nose normal    Mouth/Throat: Oropharynx is clear and moist  No oropharyngeal exudate  Eyes: Pupils are equal, round, and reactive to light  Conjunctivae are normal    Neck: Neck supple  No JVD present   No tracheal deviation present  Cardiovascular: Normal rate, regular rhythm and normal heart sounds  Pulmonary/Chest: Effort normal    Lung sounds are decreased but clear   Abdominal: Soft  He exhibits no distension  There is no tenderness  There is no guarding  Musculoskeletal:   Trace edema lower extremities   Lymphadenopathy:     He has no cervical adenopathy  Neurological: He is alert and oriented to person, place, and time  Skin: Skin is warm and dry  No rash noted  Psychiatric: He has a normal mood and affect  His behavior is normal  Thought content normal         Labs: I have personally reviewed pertinent lab results  , ABG:   Lab Results   Component Value Date    PHART 7 411 03/23/2019    HZT9RSD 46 4 (H) 03/23/2019    PO2ART 119 6 03/23/2019    LMF7VQV 28 8 (H) 03/23/2019    BEART 3 5 03/23/2019    SOURCE Radial, Left 03/23/2019   , BNP: No results found for: BNP, CBC:   Lab Results   Component Value Date    WBC 13 10 (H) 03/22/2019    HGB 14 1 03/22/2019    HCT 43 7 03/22/2019    MCV 92 03/22/2019     03/22/2019    ADJUSTEDWBC 8 60 09/14/2016    MCH 29 7 03/22/2019    MCHC 32 3 03/22/2019    RDW 12 9 03/22/2019    MPV 10 1 03/22/2019    NRBC 0 03/22/2019   , CMP:   Lab Results   Component Value Date    K 4 5 03/22/2019    CL 98 (L) 03/22/2019    CO2 29 03/22/2019    BUN 28 (H) 03/22/2019    CREATININE 1 09 03/22/2019    CALCIUM 9 7 03/22/2019    AST 11 03/22/2019    ALT 32 03/22/2019    ALKPHOS 48 03/22/2019    EGFR 74 03/22/2019   , PT/INR:   Lab Results   Component Value Date    INR 0 91 11/16/2018   , Troponin: No results found for: TROPONIN    Imaging and other studies: I have personally reviewed pertinent reports     and I have personally reviewed pertinent films in PACS
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
## 2019-03-23 NOTE — ASSESSMENT & PLAN NOTE
Likely precipitated by viral bronchitis  Procalcitonin level was negative  Patient was on Solu-Medrol 40 milligram IV q 12 hours which was tapered to 20 milligram IV q 8 hours  Continue azithromycin for 5 days and nebulizers along with symptomatic treatment with [facility]

## 2019-03-24 LAB
ANION GAP SERPL CALCULATED.3IONS-SCNC: 5 MMOL/L (ref 4–13)
BASOPHILS # BLD AUTO: 0.02 THOUSANDS/ΜL (ref 0–0.1)
BASOPHILS NFR BLD AUTO: 0 % (ref 0–1)
BUN SERPL-MCNC: 25 MG/DL (ref 5–25)
CALCIUM SERPL-MCNC: 9.4 MG/DL (ref 8.3–10.1)
CHLORIDE SERPL-SCNC: 99 MMOL/L (ref 100–108)
CO2 SERPL-SCNC: 33 MMOL/L (ref 21–32)
CREAT SERPL-MCNC: 1 MG/DL (ref 0.6–1.3)
EOSINOPHIL # BLD AUTO: 0.01 THOUSAND/ΜL (ref 0–0.61)
EOSINOPHIL NFR BLD AUTO: 0 % (ref 0–6)
ERYTHROCYTE [DISTWIDTH] IN BLOOD BY AUTOMATED COUNT: 12.5 % (ref 11.6–15.1)
GFR SERPL CREATININE-BSD FRML MDRD: 82 ML/MIN/1.73SQ M
GLUCOSE SERPL-MCNC: 215 MG/DL (ref 65–140)
GLUCOSE SERPL-MCNC: 215 MG/DL (ref 65–140)
GLUCOSE SERPL-MCNC: 266 MG/DL (ref 65–140)
GLUCOSE SERPL-MCNC: 279 MG/DL (ref 65–140)
GLUCOSE SERPL-MCNC: 299 MG/DL (ref 65–140)
GLUCOSE SERPL-MCNC: 332 MG/DL (ref 65–140)
HCT VFR BLD AUTO: 44.5 % (ref 36.5–49.3)
HGB BLD-MCNC: 14.6 G/DL (ref 12–17)
IMM GRANULOCYTES # BLD AUTO: 0.12 THOUSAND/UL (ref 0–0.2)
IMM GRANULOCYTES NFR BLD AUTO: 1 % (ref 0–2)
LYMPHOCYTES # BLD AUTO: 3.41 THOUSANDS/ΜL (ref 0.6–4.47)
LYMPHOCYTES NFR BLD AUTO: 28 % (ref 14–44)
MCH RBC QN AUTO: 29.8 PG (ref 26.8–34.3)
MCHC RBC AUTO-ENTMCNC: 32.8 G/DL (ref 31.4–37.4)
MCV RBC AUTO: 91 FL (ref 82–98)
MONOCYTES # BLD AUTO: 0.72 THOUSAND/ΜL (ref 0.17–1.22)
MONOCYTES NFR BLD AUTO: 6 % (ref 4–12)
NEUTROPHILS # BLD AUTO: 8.11 THOUSANDS/ΜL (ref 1.85–7.62)
NEUTS SEG NFR BLD AUTO: 65 % (ref 43–75)
NRBC BLD AUTO-RTO: 0 /100 WBCS
PLATELET # BLD AUTO: 195 THOUSANDS/UL (ref 149–390)
PMV BLD AUTO: 10.3 FL (ref 8.9–12.7)
POTASSIUM SERPL-SCNC: 4.4 MMOL/L (ref 3.5–5.3)
RBC # BLD AUTO: 4.9 MILLION/UL (ref 3.88–5.62)
SODIUM SERPL-SCNC: 137 MMOL/L (ref 136–145)
WBC # BLD AUTO: 12.39 THOUSAND/UL (ref 4.31–10.16)

## 2019-03-24 PROCEDURE — 94660 CPAP INITIATION&MGMT: CPT

## 2019-03-24 PROCEDURE — 82948 REAGENT STRIP/BLOOD GLUCOSE: CPT

## 2019-03-24 PROCEDURE — 99233 SBSQ HOSP IP/OBS HIGH 50: CPT | Performed by: INTERNAL MEDICINE

## 2019-03-24 PROCEDURE — 85025 COMPLETE CBC W/AUTO DIFF WBC: CPT | Performed by: INTERNAL MEDICINE

## 2019-03-24 PROCEDURE — 99232 SBSQ HOSP IP/OBS MODERATE 35: CPT | Performed by: INTERNAL MEDICINE

## 2019-03-24 PROCEDURE — 94760 N-INVAS EAR/PLS OXIMETRY 1: CPT

## 2019-03-24 PROCEDURE — 80048 BASIC METABOLIC PNL TOTAL CA: CPT | Performed by: INTERNAL MEDICINE

## 2019-03-24 PROCEDURE — 94640 AIRWAY INHALATION TREATMENT: CPT

## 2019-03-24 RX ORDER — FUROSEMIDE 10 MG/ML
20 INJECTION INTRAMUSCULAR; INTRAVENOUS ONCE
Status: COMPLETED | OUTPATIENT
Start: 2019-03-24 | End: 2019-03-24

## 2019-03-24 RX ORDER — INSULIN GLARGINE 100 [IU]/ML
50 INJECTION, SOLUTION SUBCUTANEOUS
Status: DISCONTINUED | OUTPATIENT
Start: 2019-03-24 | End: 2019-03-25

## 2019-03-24 RX ADMIN — AZITHROMYCIN MONOHYDRATE 500 MG: 250 TABLET ORAL at 17:00

## 2019-03-24 RX ADMIN — PREGABALIN 50 MG: 50 CAPSULE ORAL at 20:56

## 2019-03-24 RX ADMIN — METHYLPREDNISOLONE SODIUM SUCCINATE 20 MG: 40 INJECTION, POWDER, FOR SOLUTION INTRAMUSCULAR; INTRAVENOUS at 05:46

## 2019-03-24 RX ADMIN — IPRATROPIUM BROMIDE 0.5 MG: 0.5 SOLUTION RESPIRATORY (INHALATION) at 11:19

## 2019-03-24 RX ADMIN — INSULIN LISPRO 8 UNITS: 100 INJECTION, SOLUTION INTRAVENOUS; SUBCUTANEOUS at 08:04

## 2019-03-24 RX ADMIN — METOPROLOL TARTRATE 100 MG: 100 TABLET, FILM COATED ORAL at 08:03

## 2019-03-24 RX ADMIN — PREGABALIN 50 MG: 50 CAPSULE ORAL at 17:00

## 2019-03-24 RX ADMIN — BUSPIRONE HYDROCHLORIDE 15 MG: 10 TABLET ORAL at 08:02

## 2019-03-24 RX ADMIN — METHYLPREDNISOLONE SODIUM SUCCINATE 20 MG: 40 INJECTION, POWDER, FOR SOLUTION INTRAMUSCULAR; INTRAVENOUS at 14:19

## 2019-03-24 RX ADMIN — VENLAFAXINE HYDROCHLORIDE 150 MG: 150 CAPSULE, EXTENDED RELEASE ORAL at 08:03

## 2019-03-24 RX ADMIN — METOPROLOL TARTRATE 100 MG: 100 TABLET, FILM COATED ORAL at 21:00

## 2019-03-24 RX ADMIN — APIXABAN 5 MG: 5 TABLET, FILM COATED ORAL at 17:00

## 2019-03-24 RX ADMIN — ASPIRIN 81 MG 81 MG: 81 TABLET ORAL at 08:03

## 2019-03-24 RX ADMIN — QUETIAPINE FUMARATE 400 MG: 200 TABLET, EXTENDED RELEASE ORAL at 21:12

## 2019-03-24 RX ADMIN — FUROSEMIDE 20 MG: 10 INJECTION, SOLUTION INTRAMUSCULAR; INTRAVENOUS at 14:20

## 2019-03-24 RX ADMIN — INSULIN GLARGINE 50 UNITS: 100 INJECTION, SOLUTION SUBCUTANEOUS at 21:12

## 2019-03-24 RX ADMIN — IPRATROPIUM BROMIDE 0.5 MG: 0.5 SOLUTION RESPIRATORY (INHALATION) at 07:20

## 2019-03-24 RX ADMIN — LEVALBUTEROL HYDROCHLORIDE 1.25 MG: 1.25 SOLUTION, CONCENTRATE RESPIRATORY (INHALATION) at 07:20

## 2019-03-24 RX ADMIN — INSULIN LISPRO 12 UNITS: 100 INJECTION, SOLUTION INTRAVENOUS; SUBCUTANEOUS at 12:00

## 2019-03-24 RX ADMIN — PREGABALIN 50 MG: 50 CAPSULE ORAL at 08:03

## 2019-03-24 RX ADMIN — IPRATROPIUM BROMIDE 0.5 MG: 0.5 SOLUTION RESPIRATORY (INHALATION) at 15:25

## 2019-03-24 RX ADMIN — DILTIAZEM HYDROCHLORIDE 30 MG: 30 TABLET, FILM COATED ORAL at 21:00

## 2019-03-24 RX ADMIN — ALPRAZOLAM 2 MG: 0.5 TABLET ORAL at 21:12

## 2019-03-24 RX ADMIN — INSULIN LISPRO 8 UNITS: 100 INJECTION, SOLUTION INTRAVENOUS; SUBCUTANEOUS at 21:13

## 2019-03-24 RX ADMIN — LEVALBUTEROL HYDROCHLORIDE 1.25 MG: 1.25 SOLUTION, CONCENTRATE RESPIRATORY (INHALATION) at 11:19

## 2019-03-24 RX ADMIN — BUSPIRONE HYDROCHLORIDE 15 MG: 10 TABLET ORAL at 17:00

## 2019-03-24 RX ADMIN — PANTOPRAZOLE SODIUM 40 MG: 40 TABLET, DELAYED RELEASE ORAL at 05:45

## 2019-03-24 RX ADMIN — LEVALBUTEROL HYDROCHLORIDE 1.25 MG: 1.25 SOLUTION, CONCENTRATE RESPIRATORY (INHALATION) at 15:25

## 2019-03-24 RX ADMIN — DILTIAZEM HYDROCHLORIDE 30 MG: 30 TABLET, FILM COATED ORAL at 05:45

## 2019-03-24 RX ADMIN — METHYLPREDNISOLONE SODIUM SUCCINATE 20 MG: 40 INJECTION, POWDER, FOR SOLUTION INTRAMUSCULAR; INTRAVENOUS at 21:13

## 2019-03-24 RX ADMIN — LEVALBUTEROL HYDROCHLORIDE 1.25 MG: 1.25 SOLUTION, CONCENTRATE RESPIRATORY (INHALATION) at 19:31

## 2019-03-24 RX ADMIN — DILTIAZEM HYDROCHLORIDE 30 MG: 30 TABLET, FILM COATED ORAL at 14:20

## 2019-03-24 RX ADMIN — INSULIN LISPRO 16 UNITS: 100 INJECTION, SOLUTION INTRAVENOUS; SUBCUTANEOUS at 17:00

## 2019-03-24 RX ADMIN — IPRATROPIUM BROMIDE 0.5 MG: 0.5 SOLUTION RESPIRATORY (INHALATION) at 19:31

## 2019-03-24 RX ADMIN — APIXABAN 5 MG: 5 TABLET, FILM COATED ORAL at 08:03

## 2019-03-24 NOTE — PLAN OF CARE
Problem: RESPIRATORY - ADULT  Goal: Achieves optimal ventilation and oxygenation  Description  INTERVENTIONS:  - Assess for changes in respiratory status  - Assess for changes in mentation and behavior  - Position to facilitate oxygenation and minimize respiratory effort  - Oxygen administration by appropriate delivery method based on oxygen saturation (per order) or ABGs  - Initiate smoking cessation education as indicated  - Encourage broncho-pulmonary hygiene including cough, deep breathe, Incentive Spirometry  - Assess the need for suctioning and aspirate as needed  - Assess and instruct to report SOB or any respiratory difficulty  - Respiratory Therapy support as indicated  3/24/2019 0249 by Margarito Thorne RN  Outcome: Progressing  3/24/2019 0249 by Margarito Thorne RN  Outcome: Progressing     Problem: PAIN - ADULT  Goal: Verbalizes/displays adequate comfort level or baseline comfort level  Description  Interventions:  - Encourage patient to monitor pain and request assistance  - Assess pain using appropriate pain scale  - Administer analgesics based on type and severity of pain and evaluate response  - Implement non-pharmacological measures as appropriate and evaluate response  - Consider cultural and social influences on pain and pain management  - Notify physician/advanced practitioner if interventions unsuccessful or patient reports new pain  3/24/2019 0249 by aMrgarito Thorne RN  Outcome: Progressing  3/24/2019 0249 by Margarito Thorne RN  Outcome: Progressing     Problem: INFECTION - ADULT  Goal: Absence or prevention of progression during hospitalization  Description  INTERVENTIONS:  - Assess and monitor for signs and symptoms of infection  - Monitor lab/diagnostic results  - Monitor all insertion sites, i e  indwelling lines, tubes, and drains  - Monitor endotracheal (as able) and nasal secretions for changes in amount and color  - [place] appropriate cooling/warming therapies per order  - Administer medications as ordered  - Instruct and encourage patient and family to use good hand hygiene technique  - Identify and instruct in appropriate isolation precautions for identified infection/condition  3/24/2019 0249 by Adelina Vidal RN  Outcome: Progressing  3/24/2019 0249 by Adelina Vidal RN  Outcome: Progressing     Problem: SAFETY ADULT  Goal: Patient will remain free of falls  Description  INTERVENTIONS:  - Assess patient frequently for physical needs  -  Identify cognitive and physical deficits and behaviors that affect risk of falls    -  [place] fall precautions as indicated by assessment   - Educate patient/family on patient safety including physical limitations  - Instruct patient to call for assistance with activity based on assessment  - Modify environment to reduce risk of injury  - Consider OT/PT consult to assist with strengthening/mobility  3/24/2019 0249 by Adelina Vidal RN  Outcome: Progressing  3/24/2019 0249 by Adelina Vidal RN  Outcome: Progressing  Goal: Maintain or return to baseline ADL function  Description  INTERVENTIONS:  -  Assess patient's ability to carry out ADLs; assess patient's baseline for ADL function and identify physical deficits which impact ability to perform ADLs (bathing, care of mouth/teeth, toileting, grooming, dressing, etc )  - Assess/evaluate cause of self-care deficits   - Assess range of motion  - Assess patient's mobility; develop plan if impaired  - Assess patient's need for assistive devices and provide as appropriate  - Encourage maximum independence but intervene and supervise when necessary  ¯ Involve family in performance of ADLs  ¯ Assess for home care needs following discharge   ¯ Request OT consult to assist with ADL evaluation and planning for discharge  ¯ Provide patient education as appropriate  3/24/2019 0249 by Adelina Vidal RN  Outcome: Progressing  3/24/2019 0249 by Adelina Vidal RN  Outcome: Progressing  Goal: Maintain or return mobility status to optimal level  Description  INTERVENTIONS:  - Assess patient's baseline mobility status (ambulation, transfers, stairs, etc )    - Identify cognitive and physical deficits and behaviors that affect mobility  - Identify mobility aids required to assist with transfers and/or ambulation (gait belt, sit-to-stand, lift, walker, cane, etc )  - [place] fall precautions as indicated by assessment  - Record patient progress and toleration of activity level on Mobility SBAR; progress patient to next Phase/Stage  - Instruct patient to call for assistance with activity based on assessment  - Request Rehabilitation consult to assist with strengthening/weightbearing, etc   3/24/2019 0249 by Sarahy Burks RN  Outcome: Progressing  3/24/2019 0249 by Sarahy Burks RN  Outcome: Progressing     Problem: DISCHARGE PLANNING  Goal: Discharge to home or other facility with appropriate resources  Description  INTERVENTIONS:  - Identify barriers to discharge w/patient and caregiver  - Arrange for needed discharge resources and transportation as appropriate  - Identify discharge learning needs (meds, wound care, etc )  - Refer to Case Management Department for coordinating discharge planning if the patient needs post-hospital services based on physician/advanced practitioner order or complex needs related to functional status, cognitive ability, or social support system   3/24/2019 0249 by Sarahy Burks RN  Outcome: Progressing  3/24/2019 0249 by Sarahy Burks RN  Outcome: Progressing     Problem: Knowledge Deficit  Goal: Patient/family/caregiver demonstrates understanding of disease process, treatment plan, medications, and discharge instructions  Description  Complete learning assessment and assess knowledge base    Interventions:  - Provide teaching at level of understanding  - Provide teaching via preferred learning methods  3/24/2019 0249 by Naeem Garibay RN  Outcome: Progressing  3/24/2019 0249 by Naeem Garibay RN  Outcome: Progressing     Problem: CARDIOVASCULAR - ADULT  Goal: Maintains optimal cardiac output and hemodynamic stability  Description  INTERVENTIONS:  - Monitor I/O, vital signs and rhythm  - Monitor for S/S and trends of decreased cardiac output i e  bleeding, hypotension  - Administer and titrate ordered vasoactive medications to optimize hemodynamic stability  - Assess quality of pulses, skin color and temperature  - Assess for signs of decreased coronary artery perfusion - ex  Angina  - Instruct patient to report change in severity of symptoms  3/24/2019 0249 by Naeem Garibay RN  Outcome: Progressing  3/24/2019 0249 by Naeem Garibay RN  Outcome: Progressing  Goal: Absence of cardiac dysrhythmias or at baseline rhythm  Description  INTERVENTIONS:  - Continuous cardiac monitoring, monitor vital signs, obtain 12 lead EKG if indicated  - Administer antiarrhythmic and heart rate control medications as ordered  - Monitor electrolytes and administer replacement therapy as ordered  3/24/2019 0249 by Naeem Garibay RN  Outcome: Progressing  3/24/2019 0249 by Naeem Garibay RN  Outcome: Progressing     Problem: Potential for Falls  Goal: Patient will remain free of falls  Description  INTERVENTIONS:  - Assess patient frequently for physical needs  -  Identify cognitive and physical deficits and behaviors that affect risk of falls    -  [place] fall precautions as indicated by assessment   - Educate patient/family on patient safety including physical limitations  - Instruct patient to call for assistance with activity based on assessment  - Modify environment to reduce risk of injury  - Consider OT/PT consult to assist with strengthening/mobility  3/24/2019 0249 by Naeem Garibay RN  Outcome: Progressing  3/24/2019 0249 by Naeem Garibay RN  Outcome: Progressing     Problem: Prexisting or High Potential for Compromised Skin Integrity  Goal: Skin integrity is maintained or improved  Description  INTERVENTIONS:  - Identify patients at risk for skin breakdown  - Assess and monitor skin integrity  - Assess and monitor nutrition and hydration status  - Monitor labs (i e  albumin)  - Assess for incontinence   - Turn and reposition patient  - Assist with mobility/ambulation  - Relieve pressure over bony prominences  - Avoid friction and shearing  - Provide appropriate hygiene as needed including keeping skin clean and dry  - Evaluate need for skin moisturizer/barrier cream  - Collaborate with interdisciplinary team (i e  Nutrition, Rehabilitation, etc )   - Patient/family teaching  3/24/2019 0249 by Murphy De Luna RN  Outcome: Progressing  3/24/2019 0249 by Murphy De Luna RN  Outcome: Progressing     Problem: DISCHARGE PLANNING - CARE MANAGEMENT  Goal: Discharge to post-acute care or home with appropriate resources  Description  INTERVENTIONS:  - Conduct assessment to determine patient/family and health care team treatment goals, and need for post-acute services based on payer coverage, community resources, and patient preferences, and barriers to discharge  - Address psychosocial, clinical, and financial barriers to discharge as identified in assessment in conjunction with the patient/family and health care team  - Arrange appropriate level of post-acute services according to patient's   needs and preference and payer coverage in collaboration with the physician and health care team  - Communicate with and update the patient/family, physician, and health care team regarding progress on the discharge plan  - Arrange appropriate transportation to post-acute venues    Patient is independent  May need Bipap upon discharge  SW will follow for needs     3/24/2019 0249 by Murphy De Luna RN  Outcome: Progressing  3/24/2019 0249 by Murphy De Luna RN  Outcome: Progressing

## 2019-03-24 NOTE — PROGRESS NOTES
Progress Note - Cardiology   [facility]     Berta Rain 60 y o  male MRN: [id number]  : 1960  Unit/Bed#: [address] [place] [address] Encounter: [id number]    Assessment and Plan:   1  Acute on chronic hypoxic respiratory failure most likely due to acute tracheobronchitis  Patient also has underlying COPD and obstructive sleep apnea  He has improved significantly since yesterday  Steroids will be tapered today      2  Atrial fibrillation with rapid ventricular rate  Patient's heart rate is uncontrolled due to underlying respiratory status  Will add Cardizem  Continue increased dose of metoprolol and Cardizem  Will try to get copy of echo report from Dr Higinio Levine office     3  History of coronary artery disease status post cardiac catheterization  Catheterization was in 2016 as per note and has nonobstructive coronary artery disease     4  Obstructive sleep apnea on CPAP  Patient says he is compliant with it  Continue same Rx     5  Essential hypertension  Blood pressure is pretty well controlled with current therapy     6  Morbid obesity with BMI close to 40 advised to lose weight       7  Dyslipidemia on Mevacor  Check fasting lipids and LFTs    8  Bilateral lower extremity mild edema  No clinical evidence of diastolic heart failure will try a low-dose of Lasix partially may be dependent      Plan  Continue increased dose of metoprolol and Cardizem  Will give a trial a dose of Lasix  Concur with other Rx provided      Subjective / Objective:     Patient seen and evaluated  Feeling little bit better  Heart rate is also improved  Respiratory status has improved  He was started on steroids  Denies any chest pain  No other issues at this time  He generally follows up with Dr Carlos Oakley  Vitals: Blood pressure 125/76, pulse 86, temperature 98 3 °F (36 8 °C), temperature source Temporal, resp  rate 20, height 5' 11" (1 803 m), weight 134 kg (295 lb 3 1 oz), SpO2 94 %    Vitals: 03/23/19 0600 03/24/19 0554   Weight: 133 kg (292 lb 8 8 oz) 134 kg (295 lb 3 1 oz)     Body mass index is 41 17 kg/m²  BP Readings from Last 3 Encounters:   03/24/19 125/76   02/18/19 120/90   12/11/18 109/84     Orthostatic Blood Pressures      Most Recent Value   Blood Pressure  125/76 filed at 03/24/2019 1202   Patient Position - Orthostatic VS  Sitting filed at 03/24/2019 1202        I/O       03/22 0701 - 03/23 0700 03/23 0701 - 03/24 0700 03/24 0701 - 03/25 0700    P  O  600 1425     Total Intake(mL/kg) 600 (4 5) 1425 (10 6)     Urine (mL/kg/hr) 4250 (1 3) 5600 (1 7)     Total Output 4250 5600     Net -3650 -4170                Invasive Devices     Peripheral Intravenous Line            Peripheral IV 03/20/19 Left Antecubital 3 days                  Intake/Output Summary (Last 24 hours) at 3/24/2019 1236  Last data filed at 3/24/2019 0547  Gross per 24 hour   Intake 1185 ml   Output 5000 ml   Net -3815 ml         Physical Exam:   Physical Exam   Constitutional: He is oriented to person, place, and time  He appears well-developed and well-nourished  No distress  HENT:   Head: Normocephalic and atraumatic  Eyes: Pupils are equal, round, and reactive to light  Neck: Neck supple  No JVD present  No thyromegaly present  Cardiovascular: Normal rate, S1 normal, S2 normal and intact distal pulses  An irregularly irregular rhythm present  Exam reveals no gallop, no S3, no S4, no distant heart sounds and no friction rub  Murmur heard  Systolic murmur is present  Pulmonary/Chest: No respiratory distress  He has no wheezes  He has no rales  He exhibits no tenderness  Bilateral air entry with coarse breath sounds and rare rhonchi  Air entry has significantly improved   Abdominal: Soft  He exhibits no distension  There is no tenderness  Musculoskeletal: He exhibits edema  He exhibits no deformity  Lymphadenopathy:     He has no cervical adenopathy     Neurological: He is alert and oriented to person, place, and time  Skin: Skin is warm and dry  No rash noted  He is not diaphoretic  No pallor  Psychiatric: He has a normal mood and affect   Judgment normal                  Medications/ Allergies:     Current Facility-Administered Medications:  ALPRAZolam 2 mg Oral HS PRN Brett Bi, CRNP   apixaban 5 mg Oral BID Brett Bi, CRNP   aspirin 81 mg Oral QAM Brett Bi, CRNP   azithromycin 500 mg Oral Q24H Brett Bi, CRNP   benzonatate 200 mg Oral TID PRN Brett Bi, CRNP   busPIRone 15 mg Oral BID Brett Bi, CRNP   diltiazem 30 mg Oral Q8H [address] Alfonso Choudhury MD   fenofibrate 160 mg Oral Daily Brett Bi, CRNP   insulin glargine 48 Units Subcutaneous HS aMx Chaves MD   insulin lispro 4-20 Units Subcutaneous 4x Daily (AC & HS) Brett Fields, CRNP   insulin lispro 8 Units Subcutaneous TID With Meals Max Chaves MD   ipratropium 0 5 mg Nebulization 4x Daily Brett Bi, CRJALIL   And       levalbuterol 1 25 mg Nebulization 4x Daily Brett Bi, CRNP   methocarbamol 500 mg Oral HS PRN Brett Bi, CRNP   methylPREDNISolone sodium succinate 20 mg Intravenous [facility] Max Chaves MD   metoprolol 5 mg Intravenous Q6H PRN Brett Bi, CRJALIL   metoprolol tartrate 100 mg Oral Q12H [address] Alfonso Choudhury MD   pantoprazole 40 mg Oral Early Morning Brett Bi, CRNP   pregabalin 50 mg Oral TID Brett Fields, CRNP   QUEtiapine 400 mg Oral HS Brett Bi, CRNP   venlafaxine 150 mg Oral QAM Brett Bi, CRNP       ALPRAZolam 2 mg HS PRN   benzonatate 200 mg TID PRN   methocarbamol 500 mg HS PRN   metoprolol 5 mg Q6H PRN     Allergies   Allergen Reactions    Wellbutrin [Bupropion] Other (See Comments)     Alteration with hearing and other senses       VTE Pharmacologic Prophylaxis:   Eliquis    Labs:   Troponins:  Results from last 7 days   Lab Units 03/20/19 2049 03/20/19  1445   TROPONIN I ng/mL 0 02 0 03       CBC with diff:  Results from last 7 days   Lab Units 03/24/19  0551 03/22/19  0540 03/21/19  0550 03/20/19  1445   WBC Thousand/uL 12 39* 13 10* 11 27* 9 83   HEMOGLOBIN g/dL 14 6 14 1 14 1 14 7   HEMATOCRIT % 44 5 43 7 43 9 44 2   MCV fL 91 92 93 91   PLATELETS Thousands/uL 195 202 199 198   MCH pg 29 8 29 7 29 9 30 2   MCHC g/dL 32 8 32 3 32 1 33 3   RDW % 12 5 12 9 13 0 12 9   MPV fL 10 3 10 1 10 1 9 9   NRBC AUTO /100 WBCs 0 0 0 0       CMP:  Results from last 7 days   Lab Units 03/24/19  0551 03/22/19  0540 03/21/19  0550 03/20/19  1445   SODIUM mmol/L 137 137 138 138   POTASSIUM mmol/L 4 4 4 5 4 6 4 0   CHLORIDE mmol/L 99* 98* 102 101   CO2 mmol/L 33* 29 26 29   ANION GAP mmol/L 5 10 10 8   BUN mg/dL 25 28* 29* 18   CREATININE mg/dL 1 00 1 09 1 02 1 03   CALCIUM mg/dL 9 4 9 7 9 4 9 4   AST U/L  --  11  --  14   ALT U/L  --  32  --  35   ALK PHOS U/L  --  48  --  72   TOTAL PROTEIN g/dL  --  6 6  --  7 1   ALBUMIN g/dL  --  3 4*  --  3 7   TOTAL BILIRUBIN mg/dL  --  0 40  --  0 30   EGFR ml/min/1 73sq m 82 74 80 79       Magnesium:  Results from last 7 days   Lab Units 03/22/19  0540 03/21/19  0550   MAGNESIUM mg/dL 2 1 1 8         Imaging & Testing   I have personally reviewed pertinent reports  Xr Chest 1 View Portable    Result Date: 3/20/2019  Narrative: CHEST INDICATION:   sob  COMPARISON:  None EXAM PERFORMED/VIEWS:  XR CHEST PORTABLE  AP semierect Images: 1 FINDINGS: Heart shadow is enlarged but unchanged from prior exam  The lungs are clear  No pneumothorax or pleural effusion  Osseous structures appear within normal limits for patient age  Impression: No acute cardiopulmonary disease  Workstation performed: [id number]     Xr Chest Portable- Icu    Result Date: 3/21/2019  Narrative: CHEST INDICATION:   respiratory failure  COMPARISON:  3/20/2019 EXAM PERFORMED/VIEWS:  XR CHEST PORTABLE ICU FINDINGS: Study somewhat limited by shallow depth of inspiration and patient body habitus  Cardiac silhouette enlarged as before    No focal infiltrate is seen, no pneumothorax or significant effusion  Pulmonary vasculature is somewhat prominent Osseous structures appear within normal limits for patient age  Impression: Enlargement of cardiac silhouette  Somewhat prominent pulmonary vasculature, correlate for clinical concern of pulmonary congestion No acute focal infiltrate is seen Workstation performed: [id number]        EKG / Monitor: Personally reviewed  Monitor shows atrial fibrillation with controlled ventricular rate  Cardiac testing:   As per patient echo done at Dr Ponce Ortega office last 5 months was told has normal heart function and a cardiac catheterization done in 2016 shows no obstructive coronary artery disease  Doctor  frederic's  note noted      Dr Kanika Mai MD [facility] [place]      "This note has been constructed using a voice recognition system  Therefore there may be syntax, spelling, and/or grammatical errors   Please call if you have any questions  "

## 2019-03-24 NOTE — ASSESSMENT & PLAN NOTE
Lab Results   Component Value Date    HGBA1C 8 4 (A) 02/18/2019       Recent Labs     03/23/19  2100 03/24/19  0724 03/24/19  1136 03/24/19  1640   POCGLU 260* 215* 279* 332*       Blood Sugar Average: Last 72 hrs:  (P) 647 [id number]   Uncontrolled blood sugar secondary to steroids  Lantus dose was increased to 50 units subcutaneously daily    Continue Humalog 8 units with each meal

## 2019-03-24 NOTE — ASSESSMENT & PLAN NOTE
Likely secondary to COPD exacerbation  Patient's heart rate continued to remain uncontrolled with heart rate ranging anywhere from   Cardiology consult appreciated  Heart rate is better controlled with increased dose of metoprolol to 100 milligram p o  B i d   And on Cardizem 30 milligram p o  Q 8 hours  Patient is on anticoagulation with Eliquis

## 2019-03-24 NOTE — PLAN OF CARE
Problem: RESPIRATORY - ADULT  Goal: Achieves optimal ventilation and oxygenation  Description  INTERVENTIONS:  - Assess for changes in respiratory status  - Assess for changes in mentation and behavior  - Position to facilitate oxygenation and minimize respiratory effort  - Oxygen administration by appropriate delivery method based on oxygen saturation (per order) or ABGs  - Initiate smoking cessation education as indicated  - Encourage broncho-pulmonary hygiene including cough, deep breathe, Incentive Spirometry  - Assess the need for suctioning and aspirate as needed  - Assess and instruct to report SOB or any respiratory difficulty  - Respiratory Therapy support as indicated  Outcome: Progressing     Problem: PAIN - ADULT  Goal: Verbalizes/displays adequate comfort level or baseline comfort level  Description  Interventions:  - Encourage patient to monitor pain and request assistance  - Assess pain using appropriate pain scale  - Administer analgesics based on type and severity of pain and evaluate response  - Implement non-pharmacological measures as appropriate and evaluate response  - Consider cultural and social influences on pain and pain management  - Notify physician/advanced practitioner if interventions unsuccessful or patient reports new pain  Outcome: Progressing     Problem: INFECTION - ADULT  Goal: Absence or prevention of progression during hospitalization  Description  INTERVENTIONS:  - Assess and monitor for signs and symptoms of infection  - Monitor lab/diagnostic results  - Monitor all insertion sites, i e  indwelling lines, tubes, and drains  - Monitor endotracheal (as able) and nasal secretions for changes in amount and color  - [place] appropriate cooling/warming therapies per order  - Administer medications as ordered  - Instruct and encourage patient and family to use good hand hygiene technique  - Identify and instruct in appropriate isolation precautions for identified infection/condition  Outcome: Progressing     Problem: SAFETY ADULT  Goal: Patient will remain free of falls  Description  INTERVENTIONS:  - Assess patient frequently for physical needs  -  Identify cognitive and physical deficits and behaviors that affect risk of falls    -  [place] fall precautions as indicated by assessment   - Educate patient/family on patient safety including physical limitations  - Instruct patient to call for assistance with activity based on assessment  - Modify environment to reduce risk of injury  - Consider OT/PT consult to assist with strengthening/mobility  Outcome: Progressing  Goal: Maintain or return to baseline ADL function  Description  INTERVENTIONS:  -  Assess patient's ability to carry out ADLs; assess patient's baseline for ADL function and identify physical deficits which impact ability to perform ADLs (bathing, care of mouth/teeth, toileting, grooming, dressing, etc )  - Assess/evaluate cause of self-care deficits   - Assess range of motion  - Assess patient's mobility; develop plan if impaired  - Assess patient's need for assistive devices and provide as appropriate  - Encourage maximum independence but intervene and supervise when necessary  ¯ Involve family in performance of ADLs  ¯ Assess for home care needs following discharge   ¯ Request OT consult to assist with ADL evaluation and planning for discharge  ¯ Provide patient education as appropriate  Outcome: Progressing  Goal: Maintain or return mobility status to optimal level  Description  INTERVENTIONS:  - Assess patient's baseline mobility status (ambulation, transfers, stairs, etc )    - Identify cognitive and physical deficits and behaviors that affect mobility  - Identify mobility aids required to assist with transfers and/or ambulation (gait belt, sit-to-stand, lift, walker, cane, etc )  - [place] fall precautions as indicated by assessment  - Record patient progress and toleration of activity level on Mobility SBAR; progress patient to next Phase/Stage  - Instruct patient to call for assistance with activity based on assessment  - Request Rehabilitation consult to assist with strengthening/weightbearing, etc   Outcome: Progressing     Problem: DISCHARGE PLANNING  Goal: Discharge to home or other facility with appropriate resources  Description  INTERVENTIONS:  - Identify barriers to discharge w/patient and caregiver  - Arrange for needed discharge resources and transportation as appropriate  - Identify discharge learning needs (meds, wound care, etc )  - Refer to Case Management Department for coordinating discharge planning if the patient needs post-hospital services based on physician/advanced practitioner order or complex needs related to functional status, cognitive ability, or social support system   Outcome: Progressing     Problem: Knowledge Deficit  Goal: Patient/family/caregiver demonstrates understanding of disease process, treatment plan, medications, and discharge instructions  Description  Complete learning assessment and assess knowledge base  Interventions:  - Provide teaching at level of understanding  - Provide teaching via preferred learning methods  Outcome: Progressing     Problem: CARDIOVASCULAR - ADULT  Goal: Maintains optimal cardiac output and hemodynamic stability  Description  INTERVENTIONS:  - Monitor I/O, vital signs and rhythm  - Monitor for S/S and trends of decreased cardiac output i e  bleeding, hypotension  - Administer and titrate ordered vasoactive medications to optimize hemodynamic stability  - Assess quality of pulses, skin color and temperature  - Assess for signs of decreased coronary artery perfusion - ex   Angina  - Instruct patient to report change in severity of symptoms  Outcome: Progressing  Goal: Absence of cardiac dysrhythmias or at baseline rhythm  Description  INTERVENTIONS:  - Continuous cardiac monitoring, monitor vital signs, obtain 12 lead EKG if indicated  - Administer antiarrhythmic and heart rate control medications as ordered  - Monitor electrolytes and administer replacement therapy as ordered  Outcome: Progressing     Problem: Potential for Falls  Goal: Patient will remain free of falls  Description  INTERVENTIONS:  - Assess patient frequently for physical needs  -  Identify cognitive and physical deficits and behaviors that affect risk of falls    -  [place] fall precautions as indicated by assessment   - Educate patient/family on patient safety including physical limitations  - Instruct patient to call for assistance with activity based on assessment  - Modify environment to reduce risk of injury  - Consider OT/PT consult to assist with strengthening/mobility  Outcome: Progressing     Problem: Prexisting or High Potential for Compromised Skin Integrity  Goal: Skin integrity is maintained or improved  Description  INTERVENTIONS:  - Identify patients at risk for skin breakdown  - Assess and monitor skin integrity  - Assess and monitor nutrition and hydration status  - Monitor labs (i e  albumin)  - Assess for incontinence   - Turn and reposition patient  - Assist with mobility/ambulation  - Relieve pressure over bony prominences  - Avoid friction and shearing  - Provide appropriate hygiene as needed including keeping skin clean and dry  - Evaluate need for skin moisturizer/barrier cream  - Collaborate with interdisciplinary team (i e  Nutrition, Rehabilitation, etc )   - Patient/family teaching  Outcome: Progressing     Problem: DISCHARGE PLANNING - CARE MANAGEMENT  Goal: Discharge to post-acute care or home with appropriate resources  Description  INTERVENTIONS:  - Conduct assessment to determine patient/family and health care team treatment goals, and need for post-acute services based on payer coverage, community resources, and patient preferences, and barriers to discharge  - Address psychosocial, clinical, and financial barriers to discharge as identified in assessment in conjunction with the patient/family and health care team  - Arrange appropriate level of post-acute services according to patient's   needs and preference and payer coverage in collaboration with the physician and health care team  - Communicate with and update the patient/family, physician, and health care team regarding progress on the discharge plan  - Arrange appropriate transportation to post-acute venues    Patient is independent  May need Bipap upon discharge  SW will follow for needs     Outcome: Progressing

## 2019-03-24 NOTE — ASSESSMENT & PLAN NOTE
Likely precipitated by viral bronchitis  Procalcitonin level was negative  Patient was on Solu-Medrol 40 milligram IV q 12 hours which was tapered to 20 milligram IV q 8 hours    Continue Solu-Medrol 20 milligram IV q 8 hours  Continue azithromycin for 5 days and nebulizers along with symptomatic treatment with Scarlet Garcias

## 2019-03-24 NOTE — PROGRESS NOTES
Progress Note Rachel Mireles 1960, 60 y o  male MRN: [id number]    Unit/Bed#: [address] [place] [address] Encounter: [id number]    Primary Care Provider: Tamiko Turner MD   Date and time admitted to hospital: 3/20/2019  2:29 PM        * Acute on chronic respiratory failure with hypoxia [facility])  Assessment & Plan  Resolved  Patient needed BiPAP initially and was admitted to step-down unit  Likely secondary COPD exacerbation  Patient is currently on 2 liters of oxygen which he chronically uses even at home    Chronic bronchitis with acute exacerbation ([facility] [address] )  Assessment & Plan  Likely precipitated by viral bronchitis  Procalcitonin level was negative  Patient was on Solu-Medrol 40 milligram IV q 12 hours which was tapered to 20 milligram IV q 8 hours  Continue Solu-Medrol 20 milligram IV q 8 hours  Continue azithromycin for 5 days and nebulizers along with symptomatic treatment with Tessalon Perles    Atrial fibrillation with RVR (HCC)  Assessment & Plan  Likely secondary to COPD exacerbation  Patient's heart rate continued to remain uncontrolled with heart rate ranging anywhere from   Cardiology consult appreciated  Heart rate is better controlled with increased dose of metoprolol to 100 milligram p o  B i d  And on Cardizem 30 milligram p o  Q 8 hours  Patient is on anticoagulation with Eliquis    SHAVONNE and COPD overlap syndrome (HCC)  Assessment & Plan  Continue 2 liters oxygen during the day and patient currently is on BiPAP q h s   Which will be continued    Benign essential hypertension  Assessment & Plan  Stable on metoprolol, Cardizem and Lasix    Diabetes mellitus type 2, uncontrolled ([facility] )  Assessment & Plan  Lab Results   Component Value Date    HGBA1C 8 4 (A) 02/18/2019       Recent Labs     03/23/19  2100 03/24/19  0724 03/24/19  1136 03/24/19  1640   POCGLU 260* 215* 279* 332*       Blood Sugar Average: Last 72 hrs:  (P) 047 [id number]   Uncontrolled blood sugar secondary to steroids  Lantus dose was increased to 50 units subcutaneously daily  Continue Humalog 8 units with each meal    Esophageal reflux  Assessment & Plan  Continue Protonix    CAD (coronary artery disease)  Assessment & Plan  Continue aspirin, metoprolol and statin    Bipolar affective disorder (HCC)  Assessment & Plan  Continue Seroquel, Xanax, Effexor and BuSpar          VTE Pharmacologic Prophylaxis:   Pharmacologic: Apixaban (Eliquis)  Mechanical VTE Prophylaxis in Place: Yes    Patient Centered Rounds: I have performed bedside rounds with nursing staff today  Discussions with Specialists or Other Care Team Provider: Yes  Education and Discussions with Family / Patient:Yes  Time Spent for Care: 30 minutes  More than 50% of total time spent on counseling and coordination of care as described above  Current Length of Stay: 4 day(s)  Current Patient Status: Inpatient     Discharge Plan:  Pending    Code Status: Level 1 - Full Code      Subjective:   Patient is still having some exertional shortness of breath  Improved cough  Patient denies any chest pain, abdominal pain, nausea or vomiting      Objective:     Vitals:   Temp (24hrs), Av 8 °F (36 6 °C), Min:97 2 °F (36 2 °C), Max:98 3 °F (36 8 °C)    Temp:  [97 2 °F (36 2 °C)-98 3 °F (36 8 °C)] 97 2 °F (36 2 °C)  HR:  [] 106  Resp:  [20-31] 22  BP: (114-165)/(73-93) 165/84  SpO2:  [94 %-98 %] 95 %  Body mass index is 41 17 kg/m²  Input and Output Summary (last 24 hours): Intake/Output Summary (Last 24 hours) at 3/24/2019 1713  Last data filed at 3/24/2019 1704  Gross per 24 hour   Intake 1465 ml   Output 4250 ml   Net -2785 ml        Physical Exam:     Physical Exam   Constitutional: No distress  HENT:   Head: Normocephalic and atraumatic  Eyes: Pupils are equal, round, and reactive to light  Conjunctivae are normal    Neck: Normal range of motion  Neck supple  Cardiovascular: Normal rate  Murmur heard    Irregularly irregular   Pulmonary/Chest: Effort normal  No respiratory distress  He has no wheezes  He has no rhonchi  He has no rales  He exhibits no tenderness  Abdominal: Soft  Bowel sounds are normal  He exhibits no distension  There is no tenderness  There is no rebound and no guarding  Musculoskeletal: He exhibits no edema  Neurological: He is alert  No cranial nerve deficit  Skin: Skin is warm and dry  No rash noted  Additional Data:     Labs:    Results from last 7 days   Lab Units 03/24/19  0551 03/22/19  0540 03/21/19  0550   WBC Thousand/uL 12 39* 13 10* 11 27*   HEMOGLOBIN g/dL 14 6 14 1 14 1   HEMATOCRIT % 44 5 43 7 43 9   PLATELETS Thousands/uL 195 202 199   NEUTROS PCT % 65 76* 73     Results from last 7 days   Lab Units 03/24/19  0551 03/22/19  0540 03/21/19  0550 03/20/19  1445   SODIUM mmol/L 137 137 138 138   POTASSIUM mmol/L 4 4 4 5 4 6 4 0   CHLORIDE mmol/L 99* 98* 102 101   CO2 mmol/L 33* 29 26 29   BUN mg/dL 25 28* 29* 18   CREATININE mg/dL 1 00 1 09 1 02 1 03   CALCIUM mg/dL 9 4 9 7 9 4 9 4   TOTAL BILIRUBIN mg/dL  --  0 40  --  0 30   ALK PHOS U/L  --  48  --  72   ALT U/L  --  32  --  35   AST U/L  --  11  --  14         Results from last 7 days   Lab Units 03/20/19  2049 03/20/19  1445   TROPONIN I ng/mL 0 02 0 03     Lab Results   Component Value Date/Time    HGBA1C 8 4 (A) 02/18/2019 11:38 AM    HGBA1C 7 6 (H) 10/29/2018 08:01 AM    HGBA1C 8 0 07/21/2017 09:27 AM     Results from last 7 days   Lab Units 03/24/19  1640 03/24/19  1136 03/24/19  0724 03/23/19  2100 03/23/19  1633 03/23/19  1121 03/23/19  0712 03/22/19  1936 03/22/19  1647 03/22/19  1108 03/22/19  0707 03/21/19  2205   POC GLUCOSE mg/dl 332* 279* 215* 260* 268* 264* 209* 350* 295* 334* 226* 306*     Results from last 7 days   Lab Units 03/22/19  0540 03/20/19  1751   PROCALCITONIN ng/ml <0 05 <0 05       * I Have Reviewed All Lab Data Listed Above  * Additional Pertinent Lab Tests Reviewed:  [address] Admission Reviewed    Imaging:     XR chest portable- ICU   Final Result by Jazmine Hartman MD (03/21 6784)      Enlargement of cardiac silhouette  Somewhat prominent pulmonary vasculature, correlate for clinical concern of pulmonary congestion      No acute focal infiltrate is seen            Workstation performed: [id number]         XR chest 1 view portable   Final Result by Mariama Chin MD (03/20 6465)      No acute cardiopulmonary disease  Workstation performed: [id number]           Imaging Reports Reviewed by myself    Cultures:   Blood Culture:   Lab Results   Component Value Date    BLOODCX No Growth After 5 Days  11/16/2018    BLOODCX Staphylococcus coagulase negative (A) 11/16/2018    BLOODCX No Growth After 5 Days  10/31/2018    BLOODCX No Growth After 5 Days  10/31/2018    BLOODCX No Growth After 5 Days  09/06/2017    BLOODCX No Growth After 5 Days   09/06/2017     Urine Culture:   Lab Results   Component Value Date    URINECX 5375-7186 cfu/ml Mixed Contaminants X2 03/20/2017    URINECX No Growth <1000 cfu/mL 11/27/2016    URINECX No Growth <1000 cfu/mL 09/02/2016     Sputum Culture: No components found for: SPUTUMCX  Wound Culture: No results found for: WOUNDCULT    Last 24 Hours Medication List:     Current Facility-Administered Medications:  ALPRAZolam 2 mg Oral HS PRN Brooke Spencer, BETTIENP   apixaban 5 mg Oral BID Brooke Spencer, BETTIENP   aspirin 81 mg Oral QAM Brooke Spencer, BETTIENP   azithromycin 500 mg Oral Q24H Brooke Spencer, CRNP   benzonatate 200 mg Oral TID PRN Brooke Spencer, BETTIENP   busPIRone 15 mg Oral BID Brooke Spencer, MANAS   diltiazem 30 mg Oral Q8H [address] Meliza Luong MD   fenofibrate 160 mg Oral Daily MANAS Landrum   insulin glargine 50 Units Subcutaneous HS Ezra Ibrahim MD   insulin lispro 4-20 Units Subcutaneous 4x Daily (AC & HS) MANAS Landrum   insulin lispro 8 Units Subcutaneous TID With Meals Ezra Ibrahim MD   ipratropium 0 5 mg Nebulization 4x Daily MANAS Landrum   And levalbuterol 1 25 mg Nebulization 4x Daily MANAS Aguilera   methocarbamol 500 mg Oral HS PRN MANAS Aguilera   methylPREDNISolone sodium succinate 20 mg Intravenous [facility] Peri Cisse MD   metoprolol 5 mg Intravenous Q6H PRN MANAS Aguilera   metoprolol tartrate 100 mg Oral Q12H [address] Adamaris Vallecillo MD   pantoprazole 40 mg Oral Early Morning MANAS Aguilera   pregabalin 50 mg Oral TID MANAS Aguilera   QUEtiapine 400 mg Oral HS MANAS Aguilera   venlafaxine 150 mg Oral QAM MANAS Aguilera        Today, Patient Was Seen By: Peri Cisse MD    ** Please Note: Dragon 360 Dictation voice to text software may have been used in the creation of this document   **

## 2019-03-24 NOTE — PROGRESS NOTES
Progress Note - Pulmonary   Justo Kendrick 60 y o  male MRN: [id number]  Unit/Bed#: [address] [place] [address] Encounter: [id number]    Assessment:  Acute exacerbation of chronic bronchitis with continued improvement  Restrictive respiratory impairment secondary to obesity  Obstructive sleep apnea  Atrial fibrillation  Acute on chronic hypoxemic respiratory failure secondary to COPD exacerbation and obesity alveolar hypoventilation    Plan:  Continue azithromycin day 4 for 1 more day  Continue methylprednisone 20 mg IV every 8 hours and if continues to improve can decrease dose tomorrow  Patient is on neb treatments with levalbuterol 1 25 mg and Atrovent 0 5 mg q i d  Will continue BiPAP here and fluid his CPAP as he prefers the BiPAP pressures  When discharge I could switch his CPAP pressure from 9 to BiPAP 12/5 with 2 L of oxygen at bedtime and follow with compliance data to see if this is effective as CPAP of 9  Subjective:   Cough is better and he is less short of breath    Objective:     Vitals: Blood pressure 165/84, pulse (!) 106, temperature (!) 97 2 °F (36 2 °C), temperature source Temporal, resp  rate 22, height 5' 11" (1 803 m), weight 134 kg (295 lb 3 1 oz), SpO2 95 %  ,Body mass index is 41 17 kg/m²  Intake/Output Summary (Last 24 hours) at 3/24/2019 1708  Last data filed at 3/24/2019 1704  Gross per 24 hour   Intake 1465 ml   Output 4250 ml   Net -2785 ml       Physical Exam: Physical Exam   Constitutional: He is oriented to person, place, and time  He appears well-developed and well-nourished  No distress  On 2 L of oxygen O2 saturation 97%, no conversational dyspnea   HENT:   Head: Normocephalic  Nose: Nose normal    Mouth/Throat: Oropharynx is clear and moist  No oropharyngeal exudate  Eyes: Pupils are equal, round, and reactive to light  Conjunctivae are normal    Neck: Neck supple  No JVD present  No tracheal deviation present     Cardiovascular: Normal rate, regular rhythm and normal heart sounds  Pulmonary/Chest: Effort normal    Lung sounds are clear but decreased   Abdominal: Soft  He exhibits no distension  There is no tenderness  There is no guarding  Musculoskeletal: He exhibits no edema  Lymphadenopathy:     He has no cervical adenopathy  Neurological: He is alert and oriented to person, place, and time  Skin: Skin is warm and dry  No rash noted  Psychiatric: He has a normal mood and affect  His behavior is normal  Thought content normal         Labs: I have personally reviewed pertinent lab results  , ABG:   Lab Results   Component Value Date    PHART 7 411 03/23/2019    QYQ6YOB 46 4 (H) 03/23/2019    PO2ART 119 6 03/23/2019    FCP0PVQ 28 8 (H) 03/23/2019    BEART 3 5 03/23/2019    SOURCE Radial, Left 03/23/2019   , BNP: No results found for: BNP, CBC:   Lab Results   Component Value Date    WBC 12 39 (H) 03/24/2019    HGB 14 6 03/24/2019    HCT 44 5 03/24/2019    MCV 91 03/24/2019     03/24/2019    ADJUSTEDWBC 8 60 09/14/2016    MCH 29 8 03/24/2019    MCHC 32 8 03/24/2019    RDW 12 5 03/24/2019    MPV 10 3 03/24/2019    NRBC 0 03/24/2019   , CMP:   Lab Results   Component Value Date    K 4 4 03/24/2019    CL 99 (L) 03/24/2019    CO2 33 (H) 03/24/2019    BUN 25 03/24/2019    CREATININE 1 00 03/24/2019    CALCIUM 9 4 03/24/2019    AST 11 03/22/2019    ALT 32 03/22/2019    ALKPHOS 48 03/22/2019    EGFR 82 03/24/2019   , PT/INR:   Lab Results   Component Value Date    INR 0 91 11/16/2018   , Troponin: No results found for: TROPONIN    Imaging and other studies: I have personally reviewed pertinent reports     and I have personally reviewed pertinent films in PACS

## 2019-03-25 PROBLEM — E66.813 CLASS 3 OBESITY WITH ALVEOLAR HYPOVENTILATION AND SERIOUS COMORBIDITY IN ADULT (HCC): Status: ACTIVE | Noted: 2019-03-25

## 2019-03-25 PROBLEM — E66.2 CLASS 3 OBESITY WITH ALVEOLAR HYPOVENTILATION AND SERIOUS COMORBIDITY IN ADULT (HCC): Status: ACTIVE | Noted: 2019-03-25

## 2019-03-25 LAB
GLUCOSE SERPL-MCNC: 232 MG/DL (ref 65–140)
GLUCOSE SERPL-MCNC: 253 MG/DL (ref 65–140)
GLUCOSE SERPL-MCNC: 265 MG/DL (ref 65–140)
GLUCOSE SERPL-MCNC: 284 MG/DL (ref 65–140)

## 2019-03-25 PROCEDURE — 99232 SBSQ HOSP IP/OBS MODERATE 35: CPT | Performed by: INTERNAL MEDICINE

## 2019-03-25 PROCEDURE — 82948 REAGENT STRIP/BLOOD GLUCOSE: CPT

## 2019-03-25 PROCEDURE — 94640 AIRWAY INHALATION TREATMENT: CPT

## 2019-03-25 PROCEDURE — 94760 N-INVAS EAR/PLS OXIMETRY 1: CPT

## 2019-03-25 PROCEDURE — 99232 SBSQ HOSP IP/OBS MODERATE 35: CPT | Performed by: PHYSICIAN ASSISTANT

## 2019-03-25 PROCEDURE — 94660 CPAP INITIATION&MGMT: CPT

## 2019-03-25 RX ORDER — DILTIAZEM HYDROCHLORIDE 60 MG/1
120 CAPSULE, EXTENDED RELEASE ORAL EVERY 12 HOURS SCHEDULED
Status: DISCONTINUED | OUTPATIENT
Start: 2019-03-25 | End: 2019-03-26 | Stop reason: HOSPADM

## 2019-03-25 RX ORDER — INSULIN GLARGINE 100 [IU]/ML
55 INJECTION, SOLUTION SUBCUTANEOUS
Status: DISCONTINUED | OUTPATIENT
Start: 2019-03-25 | End: 2019-03-26 | Stop reason: HOSPADM

## 2019-03-25 RX ORDER — METHYLPREDNISOLONE SODIUM SUCCINATE 40 MG/ML
20 INJECTION, POWDER, LYOPHILIZED, FOR SOLUTION INTRAMUSCULAR; INTRAVENOUS EVERY 12 HOURS SCHEDULED
Status: DISCONTINUED | OUTPATIENT
Start: 2019-03-25 | End: 2019-03-26 | Stop reason: HOSPADM

## 2019-03-25 RX ADMIN — IPRATROPIUM BROMIDE 0.5 MG: 0.5 SOLUTION RESPIRATORY (INHALATION) at 11:32

## 2019-03-25 RX ADMIN — METOPROLOL TARTRATE 100 MG: 100 TABLET, FILM COATED ORAL at 20:08

## 2019-03-25 RX ADMIN — METHYLPREDNISOLONE SODIUM SUCCINATE 20 MG: 40 INJECTION, POWDER, FOR SOLUTION INTRAMUSCULAR; INTRAVENOUS at 20:14

## 2019-03-25 RX ADMIN — APIXABAN 5 MG: 5 TABLET, FILM COATED ORAL at 10:10

## 2019-03-25 RX ADMIN — VENLAFAXINE HYDROCHLORIDE 150 MG: 150 CAPSULE, EXTENDED RELEASE ORAL at 10:09

## 2019-03-25 RX ADMIN — PREGABALIN 50 MG: 50 CAPSULE ORAL at 10:12

## 2019-03-25 RX ADMIN — INSULIN LISPRO 12 UNITS: 100 INJECTION, SOLUTION INTRAVENOUS; SUBCUTANEOUS at 12:18

## 2019-03-25 RX ADMIN — BUSPIRONE HYDROCHLORIDE 15 MG: 10 TABLET ORAL at 10:17

## 2019-03-25 RX ADMIN — PANTOPRAZOLE SODIUM 40 MG: 40 TABLET, DELAYED RELEASE ORAL at 06:06

## 2019-03-25 RX ADMIN — LEVALBUTEROL HYDROCHLORIDE 1.25 MG: 1.25 SOLUTION, CONCENTRATE RESPIRATORY (INHALATION) at 19:21

## 2019-03-25 RX ADMIN — PREGABALIN 50 MG: 50 CAPSULE ORAL at 20:09

## 2019-03-25 RX ADMIN — LEVALBUTEROL HYDROCHLORIDE 1.25 MG: 1.25 SOLUTION, CONCENTRATE RESPIRATORY (INHALATION) at 11:32

## 2019-03-25 RX ADMIN — INSULIN GLARGINE 55 UNITS: 100 INJECTION, SOLUTION SUBCUTANEOUS at 21:26

## 2019-03-25 RX ADMIN — DILTIAZEM HYDROCHLORIDE 30 MG: 30 TABLET, FILM COATED ORAL at 12:19

## 2019-03-25 RX ADMIN — METOPROLOL TARTRATE 100 MG: 100 TABLET, FILM COATED ORAL at 10:12

## 2019-03-25 RX ADMIN — ALPRAZOLAM 2 MG: 0.5 TABLET ORAL at 20:14

## 2019-03-25 RX ADMIN — APIXABAN 5 MG: 5 TABLET, FILM COATED ORAL at 18:50

## 2019-03-25 RX ADMIN — IPRATROPIUM BROMIDE 0.5 MG: 0.5 SOLUTION RESPIRATORY (INHALATION) at 07:49

## 2019-03-25 RX ADMIN — ASPIRIN 81 MG 81 MG: 81 TABLET ORAL at 10:10

## 2019-03-25 RX ADMIN — INSULIN LISPRO 8 UNITS: 100 INJECTION, SOLUTION INTRAVENOUS; SUBCUTANEOUS at 21:38

## 2019-03-25 RX ADMIN — LEVALBUTEROL HYDROCHLORIDE 1.25 MG: 1.25 SOLUTION, CONCENTRATE RESPIRATORY (INHALATION) at 07:49

## 2019-03-25 RX ADMIN — LEVALBUTEROL HYDROCHLORIDE 1.25 MG: 1.25 SOLUTION, CONCENTRATE RESPIRATORY (INHALATION) at 16:51

## 2019-03-25 RX ADMIN — INSULIN LISPRO 8 UNITS: 100 INJECTION, SOLUTION INTRAVENOUS; SUBCUTANEOUS at 07:41

## 2019-03-25 RX ADMIN — IPRATROPIUM BROMIDE 0.5 MG: 0.5 SOLUTION RESPIRATORY (INHALATION) at 19:21

## 2019-03-25 RX ADMIN — IPRATROPIUM BROMIDE 0.5 MG: 0.5 SOLUTION RESPIRATORY (INHALATION) at 16:51

## 2019-03-25 RX ADMIN — AZITHROMYCIN MONOHYDRATE 500 MG: 250 TABLET ORAL at 16:49

## 2019-03-25 RX ADMIN — BUSPIRONE HYDROCHLORIDE 15 MG: 10 TABLET ORAL at 18:50

## 2019-03-25 RX ADMIN — PREGABALIN 50 MG: 50 CAPSULE ORAL at 16:48

## 2019-03-25 RX ADMIN — QUETIAPINE FUMARATE 400 MG: 200 TABLET, EXTENDED RELEASE ORAL at 21:09

## 2019-03-25 RX ADMIN — DILTIAZEM HYDROCHLORIDE 120 MG: 60 CAPSULE, EXTENDED RELEASE ORAL at 20:10

## 2019-03-25 RX ADMIN — DILTIAZEM HYDROCHLORIDE 30 MG: 30 TABLET, FILM COATED ORAL at 06:06

## 2019-03-25 RX ADMIN — INSULIN LISPRO 8 UNITS: 100 INJECTION, SOLUTION INTRAVENOUS; SUBCUTANEOUS at 16:50

## 2019-03-25 RX ADMIN — METHYLPREDNISOLONE SODIUM SUCCINATE 20 MG: 40 INJECTION, POWDER, FOR SOLUTION INTRAMUSCULAR; INTRAVENOUS at 06:06

## 2019-03-25 NOTE — ASSESSMENT & PLAN NOTE
-in patient with frequent recent hospitalizations namely for respiratory issues  -alveolar hypoventilation is likely secondary to obesity  -on BiPAP therapy here in hospital 12/5 40% patient maintains ABG results as listed below  -plan is to transition patient from CPAP therapy to BiPAP  Patient has a dream station which can be adjusted to this type of therapy through [address]  equipment   -patient will need 2 L bled into his BiPAP at night > settings should be 12/5  -Discussed with case management and they will follow up on contacting DME company  -discussed with Dr Leona Johnson of Medicine Team       Helen Shahrzad has chronic respiratory failure due to COPD/and Alveolar Hypoventilation and will need BIPAP  ABG results ph 7 41 PCO2 46 4  Patient in the past has only utilized CPAP which is not alleviating his chronic hypercarbic respiratory failure  BiPAP is needed to increase his efficiency for work of breathing  Without this respiratory support, serious harm or death could occur  BiPAP would decrease in hospital readmission rate and improve overall quality of life

## 2019-03-25 NOTE — ASSESSMENT & PLAN NOTE
Lab Results   Component Value Date    HGBA1C 8 4 (A) 02/18/2019       Recent Labs     03/24/19  2101 03/25/19  0713 03/25/19  1112 03/25/19  1643   POCGLU 266* 232* 284* 253*       Blood Sugar Average: Last 72 hrs:  (P) 272 875   Uncontrolled blood sugar secondary to steroids  Will increase Lantus to 55 units subcutaneously daily and Humalog with each meal to 10 units with meals

## 2019-03-25 NOTE — ASSESSMENT & PLAN NOTE
Likely precipitated by viral bronchitis  Procalcitonin level was negative  Patient was on Solu-Medrol 40 milligram IV q 12 hours which is being tapered over the past 2 days  Will decrease Solu-Medrol to 20 milligram q 12 today  Patient finished 5 day course of azithromycin    Continue Tessalon Perles and nebulizers

## 2019-03-25 NOTE — PROGRESS NOTES
Progress Note - Cardiology   [facility]     Kait Marti 60 y o  male MRN: [id number]  : 1960  Unit/Bed#: [address] [place] [address] Encounter: [id number]    Assessment and Plan:   1  Acute on chronic hypoxic respiratory failure due to tracheobronchitis/underlying COPD:  Patient states that his breathing has improved since admission to the hospital   Care per pulmonology  2  Chronic atrial fibrillation with rapid ventricular response:  Rate still remained in the 90s to low 100s  Will increase Cardizem to 30 mg q 6 hours  Patient was also noted to have elevated rates as an outpatient on Holter monitor performed at Dr Stew Garrison office  Continue Eliquis  3  Essential hypertension:  Blood pressure is relatively stable  Will continue monitor current medication  4  Dyslipidemia:  Continue Medicor      5  Obesity with BMI approximately 40:  Encouraged weight loss    6  Obstructive sleep apnea:  Patient states he is compliant with his CPAP  7  History of coronary artery disease:  Cardiac catheterization from 2016 notes nonobstructive disease  Continue optimal medical therapy  Subjective / Objective:   Patient seen examined  States that his breathing has improved since admission  He denies any further chest pain, records from Dr [place] reviewed  Patient had a 24 hour Holter which demonstrated atrial fibrillation with poor ventricular rate control  Echocardiogram demonstrated ejection fraction visibly estimated 55 60%:  Mild biatrial enlargement but no valvular issues  Vitals: Blood pressure 139/85, pulse 93, temperature 98 °F (36 7 °C), temperature source Tympanic, resp  rate 20, height 5' 11" (1 803 m), weight 131 kg (289 lb 5 oz), SpO2 95 %  Vitals:    19 0554 19 0600   Weight: 134 kg (295 lb 3 1 oz) 131 kg (289 lb 5 oz)     Body mass index is 40 35 kg/m²    BP Readings from Last 3 Encounters:   19 139/85   19 120/90   18 109/84     Orthostatic Blood Pressures      Most Recent Value   Blood Pressure  139/85 filed at 03/25/2019 0900   Patient Position - Orthostatic VS  Sitting filed at 03/25/2019 0900        I/O       03/23 0701 - 03/24 0700 03/24 0701 - 03/25 0700 03/25 0701 - 03/26 0700    P  O  1425 2930     I V  (mL/kg)  10 (0 1)     Total Intake(mL/kg) 1425 (10 6) 2940 (22 4)     Urine (mL/kg/hr) 5600 (1 7) 5875 (1 9)     Total Output 5600 5875     Net -4175 -2934                Invasive Devices     Peripheral Intravenous Line            Peripheral IV 03/24/19 Left;Upper Arm less than 1 day                  Intake/Output Summary (Last 24 hours) at 3/25/2019 1055  Last data filed at 3/25/2019 0148  Gross per 24 hour   Intake 2780 ml   Output 5875 ml   Net -3095 ml         Physical Exam:   Physical Exam   Constitutional: He is oriented to person, place, and time  He appears well-developed and well-nourished  No distress  HENT:   Head: Normocephalic and atraumatic  Right Ear: External ear normal    Left Ear: External ear normal    Eyes: Pupils are equal, round, and reactive to light  Conjunctivae are normal  Right eye exhibits no discharge  Left eye exhibits no discharge  No scleral icterus  Neck: Normal range of motion  Neck supple  No JVD present  No thyromegaly present  Cardiovascular: Normal rate, intact distal pulses and normal pulses  An irregular rhythm present  No murmur heard  Pulmonary/Chest: No accessory muscle usage  No respiratory distress  He has decreased breath sounds  He has wheezes  Decreased breath sounds throughout with scattered expiratory wheezing   Abdominal: Soft  Bowel sounds are normal  He exhibits no distension  Musculoskeletal: He exhibits no edema  Neurological: He is alert and oriented to person, place, and time  Skin: Skin is warm and dry  Capillary refill takes less than 2 seconds  No rash noted  He is not diaphoretic  No erythema  Psychiatric: He has a normal mood and affect     Nursing note and vitals reviewed              Medications/ Allergies:     Current Facility-Administered Medications:  ALPRAZolam 2 mg Oral HS PRN Hermon Sender, CRNP   apixaban 5 mg Oral BID Sandra Sender, CRNP   aspirin 81 mg Oral QAM Sandra Sender, CRNP   azithromycin 500 mg Oral Q24H Hermon Sender, CRNP   benzonatate 200 mg Oral TID PRN Sandra Sender, CRNP   busPIRone 15 mg Oral BID Hermon Sender, CRNP   diltiazem 30 mg Oral Q6H [facility] Destiny Antonio, CRNP   fenofibrate 160 mg Oral Daily Hermon Sender, CRNP   insulin glargine 50 Units Subcutaneous HS Jaciel Archuleta MD   insulin lispro 4-20 Units Subcutaneous 4x Daily (AC & HS) Hermon Sender, CRNP   insulin lispro 8 Units Subcutaneous TID With Meals Jaciel Archuleta MD   ipratropium 0 5 mg Nebulization 4x Daily Sandra Sender, CRNP   And       levalbuterol 1 25 mg Nebulization 4x Daily Sandra Sender, CRNP   methocarbamol 500 mg Oral HS PRN Hermon Sender, CRNP   methylPREDNISolone sodium succinate 20 mg Intravenous Q12H [facility] Heather Lira PA-C   metoprolol 5 mg Intravenous Q6H PRN Hermon Sender, CRNP   metoprolol tartrate 100 mg Oral Q12H [facility] Eligio Santacruz MD   pantoprazole 40 mg Oral Early Morning Sandra Sender, CRNP   pregabalin 50 mg Oral TID Sandra Sender, CRNP   QUEtiapine 400 mg Oral HS Hermon Sender, CRNP   venlafaxine 150 mg Oral QAM Hermon Sender, CRNP       ALPRAZolam 2 mg HS PRN   benzonatate 200 mg TID PRN   methocarbamol 500 mg HS PRN   metoprolol 5 mg Q6H PRN     Allergies   Allergen Reactions    Wellbutrin [Bupropion] Other (See Comments)     Alteration with hearing and other senses       VTE Pharmacologic Prophylaxis:   Sequential compression device (Venodyne)  and Eliquis    Labs:   Troponins:  Results from last 7 days   Lab Units 03/20/19  2049 03/20/19  1445   TROPONIN I ng/mL 0 02 0 03     CBC with diff:  Results from last 7 days   Lab Units 03/24/19  0551 03/22/19  0540 03/21/19  0550 03/20/19  1445   WBC Thousand/uL 12 39* 13 10* 11 27* 9 83   HEMOGLOBIN g/dL 14 6 14 1 14 1 14 7   HEMATOCRIT % 44 5 43 7 43 9 44 2   MCV fL 91 92 93 91   PLATELETS Thousands/uL 195 202 199 198   MCH pg 29 8 29 7 29 9 30 2   MCHC g/dL 32 8 32 3 32 1 33 3   RDW % 12 5 12 9 13 0 12 9   MPV fL 10 3 10 1 10 1 9 9   NRBC AUTO /100 WBCs 0 0 0 0     CMP:  Results from last 7 days   Lab Units 03/24/19  0551 03/22/19  0540 03/21/19  0550 03/20/19  1445   SODIUM mmol/L 137 137 138 138   POTASSIUM mmol/L 4 4 4 5 4 6 4 0   CHLORIDE mmol/L 99* 98* 102 101   CO2 mmol/L 33* 29 26 29   ANION GAP mmol/L 5 10 10 8   BUN mg/dL 25 28* 29* 18   CREATININE mg/dL 1 00 1 09 1 02 1 03   CALCIUM mg/dL 9 4 9 7 9 4 9 4   AST U/L  --  11  --  14   ALT U/L  --  32  --  35   ALK PHOS U/L  --  48  --  72   TOTAL PROTEIN g/dL  --  6 6  --  7 1   ALBUMIN g/dL  --  3 4*  --  3 7   TOTAL BILIRUBIN mg/dL  --  0 40  --  0 30   EGFR ml/min/1 73sq m 82 74 80 79     Magnesium:  Results from last 7 days   Lab Units 03/22/19  0540 03/21/19  0550   MAGNESIUM mg/dL 2 1 1 8       Imaging & Testing   I have personally reviewed pertinent reports  Xr Chest 1 View Portable    Result Date: 3/20/2019  Narrative: CHEST INDICATION:   sob  COMPARISON:  None EXAM PERFORMED/VIEWS:  XR CHEST PORTABLE  AP semierect Images: 1 FINDINGS: Heart shadow is enlarged but unchanged from prior exam  The lungs are clear  No pneumothorax or pleural effusion  Osseous structures appear within normal limits for patient age  Impression: No acute cardiopulmonary disease  Workstation performed: [id number]     Xr Chest Portable- Icu    Result Date: 3/21/2019  Narrative: CHEST INDICATION:   respiratory failure  COMPARISON:  3/20/2019 EXAM PERFORMED/VIEWS:  XR CHEST PORTABLE ICU FINDINGS: Study somewhat limited by shallow depth of inspiration and patient body habitus  Cardiac silhouette enlarged as before  No focal infiltrate is seen, no pneumothorax or significant effusion    Pulmonary vasculature is somewhat prominent Osseous structures appear within normal limits for patient age  Impression: Enlargement of cardiac silhouette  Somewhat prominent pulmonary vasculature, correlate for clinical concern of pulmonary congestion No acute focal infiltrate is seen Workstation performed: [id number]        EKG / Monitor: Personally reviewed  Atrial fibrillation    Sumaya Rodas        "This note has been constructed using a voice recognition system  Therefore there may be syntax, spelling, and/or grammatical errors   Please call if you have any questions  "

## 2019-03-25 NOTE — PROGRESS NOTES
Progress Note Kaylen Perez 1960, 60 y o  male MRN: [id number]    Unit/Bed#: [address] [place] [address] Encounter: [id number]    Primary Care Provider: Bryce Sebastian MD   Date and time admitted to hospital: 3/20/2019  2:29 PM        * Acute on chronic respiratory failure with hypoxia [facility])  Assessment & Plan  Resolved  Patient needed BiPAP initially and was admitted to step-down unit  Likely secondary COPD exacerbation  Patient is currently on 2 liters of oxygen which he chronically uses even at home    Chronic bronchitis with acute exacerbation ([facility] [address] )  Assessment & Plan  Likely precipitated by viral bronchitis  Procalcitonin level was negative  Patient was on Solu-Medrol 40 milligram IV q 12 hours which is being tapered over the past 2 days  Will decrease Solu-Medrol to 20 milligram q 12 today  Patient finished 5 day course of azithromycin  Continue Tessalon Perles and nebulizers    Atrial fibrillation with RVR ([facility] [address] )  Assessment & Plan  Likely secondary to COPD exacerbation  Heart rate is better controlled  Patient's Cardizem was changed to long-acting 120 milligram p o  B i d    Cardiology consult appreciated and input noted  Metoprolol dose was increased to 100 milligram p o  B i d  Patient is on anticoagulation with Eliquis  Echo from [facility] office showed EF of 50 for 60% without any regional wall motion abnormalities or valvular abnormality and biatrial enlargement    SHAVONNE and COPD overlap syndrome (HCC)  Assessment & Plan  Continue 2 liters oxygen during the day and patient currently is on BiPAP q h s   Which will be continued    Benign essential hypertension  Assessment & Plan  Stable on metoprolol, Cardizem and Lasix    Diabetes mellitus type 2, uncontrolled (HCC)  Assessment & Plan  Lab Results   Component Value Date    HGBA1C 8 4 (A) 02/18/2019       Recent Labs     03/24/19  2101 03/25/19  0713 03/25/19  1112 03/25/19  1643   POCGLU 266* 232* 284* 253*       Blood Sugar Average: Last 72 hrs:  (P) 213 281   Uncontrolled blood sugar secondary to steroids  Will increase Lantus to 55 units subcutaneously daily and Humalog with each meal to 10 units with meals    Esophageal reflux  Assessment & Plan  Continue Protonix    CAD (coronary artery disease)  Assessment & Plan  Continue aspirin, metoprolol and statin    Bipolar affective disorder (HCC)  Assessment & Plan  Continue Seroquel, Xanax, Effexor and BuSpar          VTE Pharmacologic Prophylaxis:   Pharmacologic: Apixaban (Eliquis)  Mechanical VTE Prophylaxis in Place: Yes    Patient Centered Rounds: I have performed bedside rounds with nursing staff today  Discussions with Specialists or Other Care Team Provider: Damian Hogan  Education and Discussions with Family / Patient:Yes  Time Spent for Care: 30 minutes  More than 50% of total time spent on counseling and coordination of care as described above  Current Length of Stay: 5 day(s)  Current Patient Status: Inpatient     Discharge Plan: Home    Code Status: Level 1 - Full Code      Subjective:   Patient has improved cough still having some shortness of breath  Denies any chest pain, abdominal pain, nausea or vomiting  Objective:     Vitals:   Temp (24hrs), Av 8 °F (36 6 °C), Min:97 4 °F (36 3 °C), Max:98 4 °F (36 9 °C)    Temp:  [97 4 °F (36 3 °C)-98 4 °F (36 9 °C)] 97 5 °F (36 4 °C)  HR:  [] 102  Resp:  [18-21] 18  BP: (124-165)/(84-95) 143/84  SpO2:  [94 %-98 %] 97 %  Body mass index is 40 35 kg/m²  Input and Output Summary (last 24 hours): Intake/Output Summary (Last 24 hours) at 3/25/2019 192  Last data filed at 3/25/2019 1853  Gross per 24 hour   Intake 1940 ml   Output 4975 ml   Net -3035 ml        Physical Exam:     Physical Exam   Constitutional: No distress  HENT:   Head: Normocephalic and atraumatic  Eyes: Pupils are equal, round, and reactive to light  Conjunctivae are normal    Neck: Normal range of motion  Neck supple     Cardiovascular: Normal rate and normal heart sounds  Irregularly irregular   Pulmonary/Chest: Effort normal  No respiratory distress  He has no wheezes  He has no rhonchi  He has no rales  He exhibits no tenderness  Coarse breath sounds heard   Abdominal: Soft  Bowel sounds are normal  He exhibits no distension  There is no tenderness  There is no rebound and no guarding  Musculoskeletal: He exhibits edema  Trace edema   Neurological: He is alert  No cranial nerve deficit  Skin: Skin is warm and dry  No rash noted         Additional Data:     Labs:    Results from last 7 days   Lab Units 03/24/19  0551 03/22/19  0540 03/21/19  0550   WBC Thousand/uL 12 39* 13 10* 11 27*   HEMOGLOBIN g/dL 14 6 14 1 14 1   HEMATOCRIT % 44 5 43 7 43 9   PLATELETS Thousands/uL 195 202 199   NEUTROS PCT % 65 76* 73     Results from last 7 days   Lab Units 03/24/19  0551 03/22/19  0540 03/21/19  0550 03/20/19  1445   SODIUM mmol/L 137 137 138 138   POTASSIUM mmol/L 4 4 4 5 4 6 4 0   CHLORIDE mmol/L 99* 98* 102 101   CO2 mmol/L 33* 29 26 29   BUN mg/dL 25 28* 29* 18   CREATININE mg/dL 1 00 1 09 1 02 1 03   CALCIUM mg/dL 9 4 9 7 9 4 9 4   TOTAL BILIRUBIN mg/dL  --  0 40  --  0 30   ALK PHOS U/L  --  48  --  72   ALT U/L  --  32  --  35   AST U/L  --  11  --  14         Results from last 7 days   Lab Units 03/20/19  2049 03/20/19  1445   TROPONIN I ng/mL 0 02 0 03     Lab Results   Component Value Date/Time    HGBA1C 8 4 (A) 02/18/2019 11:38 AM    HGBA1C 7 6 (H) 10/29/2018 08:01 AM    HGBA1C 8 0 07/21/2017 09:27 AM     Results from last 7 days   Lab Units 03/25/19  1643 03/25/19  1112 03/25/19  0713 03/24/19  2101 03/24/19 2016 03/24/19  1640 03/24/19  1136 03/24/19  0724 03/23/19  2100 03/23/19  1633 03/23/19  1121 03/23/19  0712   POC GLUCOSE mg/dl 253* 284* 232* 266* 299* 332* 279* 215* 260* 268* 264* 209*     Results from last 7 days   Lab Units 03/22/19  0540 03/20/19  1751   PROCALCITONIN ng/ml <0 05 <0 05       * I Have Reviewed All Lab Data Listed Above  * Additional Pertinent Lab Tests Reviewed: [address] Admission Reviewed    Imaging:     XR chest portable- ICU   Final Result by Junior Shaikh MD (03/21 1014)      Enlargement of cardiac silhouette  Somewhat prominent pulmonary vasculature, correlate for clinical concern of pulmonary congestion      No acute focal infiltrate is seen            Workstation performed: [id number]         XR chest 1 view portable   Final Result by Star Juárez MD (03/20 1223)      No acute cardiopulmonary disease  Workstation performed: [id number]           Imaging Reports Reviewed by myself    Cultures:   Blood Culture:   Lab Results   Component Value Date    BLOODCX No Growth After 5 Days  11/16/2018    BLOODCX Staphylococcus coagulase negative (A) 11/16/2018    BLOODCX No Growth After 5 Days  10/31/2018    BLOODCX No Growth After 5 Days  10/31/2018    BLOODCX No Growth After 5 Days  09/06/2017    BLOODCX No Growth After 5 Days   09/06/2017     Urine Culture:   Lab Results   Component Value Date    URINECX 0659-6468 cfu/ml Mixed Contaminants X2 03/20/2017    URINECX No Growth <1000 cfu/mL 11/27/2016    URINECX No Growth <1000 cfu/mL 09/02/2016     Sputum Culture: No components found for: SPUTUMCX  Wound Culture: No results found for: WOUNDCULT    Last 24 Hours Medication List:     Current Facility-Administered Medications:  ALPRAZolam 2 mg Oral HS PRN Ny Needles, CRNP   apixaban 5 mg Oral BID Ny Needles, CRNP   aspirin 81 mg Oral QAM Ny Needles, CRNP   benzonatate 200 mg Oral TID PRN Ny [place], CRNP   busPIRone 15 mg Oral BID Ny Needles, CRNP   diltiazem 120 mg Oral Q12H [address] Navtej Michael, DO   fenofibrate 160 mg Oral Daily Ny Needles, CRNP   insulin glargine 55 Units Subcutaneous HS Gallo Lynch MD   [START ON 3/26/2019] insulin lispro 10 Units Subcutaneous TID With Meals Gallo Lynch MD   insulin lispro 4-20 Units Subcutaneous 4x Daily (AC & HS) Aldo Zacarias, MANAS   ipratropium 0 5 mg Nebulization 4x Daily MANAS Rodríguez   And       levalbuterol 1 25 mg Nebulization 4x Daily MANAS Rodríguez   methocarbamol 500 mg Oral HS PRN Aldo Zacarias, MANAS   methylPREDNISolone sodium succinate 20 mg Intravenous Q12H [address] Caleb Frost PA-C   metoprolol 5 mg Intravenous Q6H PRN Aldo Zacarias, MANAS   metoprolol tartrate 100 mg Oral Q12H [address] Vincent Martinez MD   pantoprazole 40 mg Oral Early Morning Aldo Zacarias, MANAS   pregabalin 50 mg Oral TID MANAS Rodríguez   QUEtiapine 400 mg Oral HS Aldo Zacarias, MANAS   venlafaxine 150 mg Oral QAM MANAS Rodríguez        Today, Patient Was Seen By: Beatris Mcghee MD    ** Please Note: Dragon 360 Dictation voice to text software may have been used in the creation of this document   **

## 2019-03-25 NOTE — ASSESSMENT & PLAN NOTE
Likely secondary to COPD exacerbation  Heart rate is better controlled  Patient's Cardizem was changed to long-acting 120 milligram p o  B i d    Cardiology consult appreciated and input noted  Metoprolol dose was increased to 100 milligram p o  B i d    Patient is on anticoagulation with Eliquis  Echo from [facility] office showed EF of 50 for 60% without any regional wall motion abnormalities or valvular abnormality and biatrial enlargement

## 2019-03-26 VITALS
RESPIRATION RATE: 19 BRPM | WEIGHT: 282.41 LBS | OXYGEN SATURATION: 98 % | HEART RATE: 100 BPM | SYSTOLIC BLOOD PRESSURE: 134 MMHG | HEIGHT: 71 IN | BODY MASS INDEX: 39.54 KG/M2 | DIASTOLIC BLOOD PRESSURE: 88 MMHG | TEMPERATURE: 97.3 F

## 2019-03-26 PROBLEM — R94.2 RESTRICTIVE VENTILATORY DEFECT: Status: ACTIVE | Noted: 2019-03-26

## 2019-03-26 PROBLEM — I48.91 ATRIAL FIBRILLATION WITH RVR (HCC): Status: RESOLVED | Noted: 2018-12-11 | Resolved: 2019-03-26

## 2019-03-26 PROBLEM — J96.21 ACUTE ON CHRONIC RESPIRATORY FAILURE WITH HYPOXIA (HCC): Status: RESOLVED | Noted: 2018-10-31 | Resolved: 2019-03-26

## 2019-03-26 LAB
ANION GAP SERPL CALCULATED.3IONS-SCNC: 7 MMOL/L (ref 4–13)
BASOPHILS # BLD AUTO: 0.04 THOUSANDS/ΜL (ref 0–0.1)
BASOPHILS NFR BLD AUTO: 0 % (ref 0–1)
BUN SERPL-MCNC: 28 MG/DL (ref 5–25)
CALCIUM SERPL-MCNC: 9.4 MG/DL (ref 8.3–10.1)
CHLORIDE SERPL-SCNC: 99 MMOL/L (ref 100–108)
CO2 SERPL-SCNC: 29 MMOL/L (ref 21–32)
CREAT SERPL-MCNC: 1.03 MG/DL (ref 0.6–1.3)
EOSINOPHIL # BLD AUTO: 0.03 THOUSAND/ΜL (ref 0–0.61)
EOSINOPHIL NFR BLD AUTO: 0 % (ref 0–6)
ERYTHROCYTE [DISTWIDTH] IN BLOOD BY AUTOMATED COUNT: 12.6 % (ref 11.6–15.1)
GFR SERPL CREATININE-BSD FRML MDRD: 79 ML/MIN/1.73SQ M
GLUCOSE SERPL-MCNC: 143 MG/DL (ref 65–140)
GLUCOSE SERPL-MCNC: 195 MG/DL (ref 65–140)
GLUCOSE SERPL-MCNC: 203 MG/DL (ref 65–140)
HCT VFR BLD AUTO: 47.1 % (ref 36.5–49.3)
HGB BLD-MCNC: 15.1 G/DL (ref 12–17)
IMM GRANULOCYTES # BLD AUTO: 0.21 THOUSAND/UL (ref 0–0.2)
IMM GRANULOCYTES NFR BLD AUTO: 2 % (ref 0–2)
LYMPHOCYTES # BLD AUTO: 3.83 THOUSANDS/ΜL (ref 0.6–4.47)
LYMPHOCYTES NFR BLD AUTO: 28 % (ref 14–44)
MCH RBC QN AUTO: 29.2 PG (ref 26.8–34.3)
MCHC RBC AUTO-ENTMCNC: 32.1 G/DL (ref 31.4–37.4)
MCV RBC AUTO: 91 FL (ref 82–98)
MONOCYTES # BLD AUTO: 0.65 THOUSAND/ΜL (ref 0.17–1.22)
MONOCYTES NFR BLD AUTO: 5 % (ref 4–12)
NEUTROPHILS # BLD AUTO: 9.1 THOUSANDS/ΜL (ref 1.85–7.62)
NEUTS SEG NFR BLD AUTO: 65 % (ref 43–75)
NRBC BLD AUTO-RTO: 0 /100 WBCS
PLATELET # BLD AUTO: 217 THOUSANDS/UL (ref 149–390)
PMV BLD AUTO: 10.3 FL (ref 8.9–12.7)
POTASSIUM SERPL-SCNC: 4.5 MMOL/L (ref 3.5–5.3)
RBC # BLD AUTO: 5.17 MILLION/UL (ref 3.88–5.62)
SODIUM SERPL-SCNC: 135 MMOL/L (ref 136–145)
WBC # BLD AUTO: 13.86 THOUSAND/UL (ref 4.31–10.16)

## 2019-03-26 PROCEDURE — 80048 BASIC METABOLIC PNL TOTAL CA: CPT | Performed by: INTERNAL MEDICINE

## 2019-03-26 PROCEDURE — 94664 DEMO&/EVAL PT USE INHALER: CPT

## 2019-03-26 PROCEDURE — 99232 SBSQ HOSP IP/OBS MODERATE 35: CPT | Performed by: INTERNAL MEDICINE

## 2019-03-26 PROCEDURE — 94668 MNPJ CHEST WALL SBSQ: CPT

## 2019-03-26 PROCEDURE — 82948 REAGENT STRIP/BLOOD GLUCOSE: CPT

## 2019-03-26 PROCEDURE — 94760 N-INVAS EAR/PLS OXIMETRY 1: CPT

## 2019-03-26 PROCEDURE — 99239 HOSP IP/OBS DSCHRG MGMT >30: CPT | Performed by: FAMILY MEDICINE

## 2019-03-26 PROCEDURE — 99232 SBSQ HOSP IP/OBS MODERATE 35: CPT | Performed by: PHYSICIAN ASSISTANT

## 2019-03-26 PROCEDURE — 94640 AIRWAY INHALATION TREATMENT: CPT

## 2019-03-26 PROCEDURE — 85025 COMPLETE CBC W/AUTO DIFF WBC: CPT | Performed by: INTERNAL MEDICINE

## 2019-03-26 RX ORDER — PREDNISONE 10 MG/1
TABLET ORAL
Qty: 30 TABLET | Refills: 0 | Status: SHIPPED | OUTPATIENT
Start: 2019-03-26 | End: 2019-06-11

## 2019-03-26 RX ORDER — DILTIAZEM HYDROCHLORIDE 120 MG/1
120 CAPSULE, EXTENDED RELEASE ORAL EVERY 12 HOURS SCHEDULED
Qty: 60 CAPSULE | Refills: 0 | Status: SHIPPED | OUTPATIENT
Start: 2019-03-26 | End: 2019-04-19 | Stop reason: SDUPTHER

## 2019-03-26 RX ORDER — APIXABAN 5 MG/1
5 TABLET, FILM COATED ORAL 2 TIMES DAILY
Refills: 0
Start: 2019-03-26 | End: 2020-02-27 | Stop reason: SDUPTHER

## 2019-03-26 RX ORDER — METOPROLOL TARTRATE 100 MG/1
100 TABLET ORAL EVERY 12 HOURS SCHEDULED
Qty: 60 TABLET | Refills: 0 | Status: ON HOLD | OUTPATIENT
Start: 2019-03-26 | End: 2019-07-01 | Stop reason: SDUPTHER

## 2019-03-26 RX ADMIN — METOPROLOL TARTRATE 100 MG: 100 TABLET, FILM COATED ORAL at 08:26

## 2019-03-26 RX ADMIN — INSULIN LISPRO 4 UNITS: 100 INJECTION, SOLUTION INTRAVENOUS; SUBCUTANEOUS at 11:50

## 2019-03-26 RX ADMIN — IPRATROPIUM BROMIDE 0.5 MG: 0.5 SOLUTION RESPIRATORY (INHALATION) at 11:07

## 2019-03-26 RX ADMIN — METHYLPREDNISOLONE SODIUM SUCCINATE 20 MG: 40 INJECTION, POWDER, FOR SOLUTION INTRAMUSCULAR; INTRAVENOUS at 08:26

## 2019-03-26 RX ADMIN — PREGABALIN 50 MG: 50 CAPSULE ORAL at 08:26

## 2019-03-26 RX ADMIN — VENLAFAXINE HYDROCHLORIDE 150 MG: 150 CAPSULE, EXTENDED RELEASE ORAL at 08:26

## 2019-03-26 RX ADMIN — ASPIRIN 81 MG 81 MG: 81 TABLET ORAL at 08:26

## 2019-03-26 RX ADMIN — LEVALBUTEROL HYDROCHLORIDE 1.25 MG: 1.25 SOLUTION, CONCENTRATE RESPIRATORY (INHALATION) at 07:34

## 2019-03-26 RX ADMIN — PANTOPRAZOLE SODIUM 40 MG: 40 TABLET, DELAYED RELEASE ORAL at 05:03

## 2019-03-26 RX ADMIN — IPRATROPIUM BROMIDE 0.5 MG: 0.5 SOLUTION RESPIRATORY (INHALATION) at 07:34

## 2019-03-26 RX ADMIN — APIXABAN 5 MG: 5 TABLET, FILM COATED ORAL at 08:26

## 2019-03-26 RX ADMIN — BENZONATATE 200 MG: 100 CAPSULE ORAL at 08:26

## 2019-03-26 RX ADMIN — DILTIAZEM HYDROCHLORIDE 120 MG: 60 CAPSULE, EXTENDED RELEASE ORAL at 08:26

## 2019-03-26 RX ADMIN — LEVALBUTEROL HYDROCHLORIDE 1.25 MG: 1.25 SOLUTION, CONCENTRATE RESPIRATORY (INHALATION) at 11:07

## 2019-03-26 RX ADMIN — BUSPIRONE HYDROCHLORIDE 15 MG: 10 TABLET ORAL at 08:26

## 2019-03-26 NOTE — SOCIAL WORK
Patient discharging to home today 3/26  Referral to [facility] DME made for bibpap at night  Referral received and will be delivered to patients home today  Patient has a friend transporting him home  No other needs

## 2019-03-26 NOTE — PLAN OF CARE
Problem: DISCHARGE PLANNING - CARE MANAGEMENT  Goal: Discharge to post-acute care or home with appropriate resources  Description  INTERVENTIONS:  - Conduct assessment to determine patient/family and health care team treatment goals, and need for post-acute services based on payer coverage, community resources, and patient preferences, and barriers to discharge  - Address psychosocial, clinical, and financial barriers to discharge as identified in assessment in conjunction with the patient/family and health care team  - Arrange appropriate level of post-acute services according to patient's   needs and preference and payer coverage in collaboration with the physician and health care team  - Communicate with and update the patient/family, physician, and health care team regarding progress on the discharge plan  - Arrange appropriate transportation to post-acute venues    Patient is independent  May need Bipap upon discharge  SW will follow for needs  Outcome: Completed  Note:   Patient discharging to home today with bipap being delivered to his home this evening  No other needs  Family provided transportation

## 2019-03-26 NOTE — DISCHARGE INSTRUCTIONS
Follow up with your cardiologist after discharge    Use 2 Liters of oxygen with BiPAP at night   Bipap settings should be 12/5

## 2019-03-26 NOTE — DISCHARGE SUMMARY
Discharge Summary - [address] [facility]    Patient Information: Stephanie Kc 60 y o  male MRN: [id number]  Unit/Bed#: [address] [place] [address] Encounter: [id number]    Discharging Physician / Practitioner: Hung Espinosa DO  PCP: Jayjay Bernal MD  Admission Date: 3/20/2019  Discharge Date: 03/26/19    Reason for Admission: Cough (cough for about a month, seen by  was on antibiotics and not seeming to go away, had some chest discomfort with it, Just came from cardiologists office, had ekg and cleared with heart pt states)      Discharge Diagnoses:     Principal Problem (Resolved):    Acute on chronic respiratory failure with hypoxia ([facility] )  Active Problems:    Chronic bronchitis with acute exacerbation ([facility] )    Bipolar affective disorder ([facility] )    Diabetes mellitus type 2, uncontrolled ([facility] )    SHAVONNE and COPD overlap syndrome ([facility] )    CAD (coronary artery disease)    Esophageal reflux    Benign essential hypertension  Resolved Problems:    Atrial fibrillation with RVR ([facility] )        * Acute on chronic respiratory failure with hypoxia (HCC)resolved as of 3/26/2019  Assessment & Plan  Resolved  Likely secondary COPD exacerbation  Patient is currently stable on room air  He uses 2 L of oxygen at night and p r n  During the day chronically    Chronic bronchitis with acute exacerbation ([facility] )  Assessment & Plan  Likely precipitated by viral bronchitis  Procalcitonin level was negative  Patient was on IV Solu-Medrol which was tapered down as his symptoms improved  Transitioned to prednisone taper on discharge  Pulmonary follow-up after discharge   Patient finished 5 day course of azithromycin  Continue nebulizers    Atrial fibrillation with RVR (HCC)resolved as of 3/26/2019  Assessment & Plan  Likely secondary to COPD exacerbation  Heart rate is better controlled    Continue Cardizem and Lopressor dose was increased  Patient is on anticoagulation with Eliquis  Echo from [facility] office showed EF of 50 for 60% without any regional wall motion abnormalities or valvular abnormality and biatrial enlargement  Follow up with own cardiologist after discharge    Benign essential hypertension  Assessment & Plan  Continue metoprolol, Cardizem     Esophageal reflux  Assessment & Plan  Continue Prilosec    CAD (coronary artery disease)  Assessment & Plan  Continue aspirin, metoprolol and statin  SHAVONNE and COPD overlap syndrome (HCC)  Assessment & Plan  Continue BiPAP q h s  Along with oxygen    Diabetes mellitus type 2, uncontrolled ([facility] )  Assessment & Plan  Lab Results   Component Value Date    HGBA1C 8 4 (A) 02/18/2019       Recent Labs     03/25/19  1643 03/25/19  2049 03/26/19  0729 03/26/19  1110   POCGLU 253* 265* 143* 203*     Blood sugars not well control likely secondary to steroids  Continue home medications and follow up with PCP after discharge    Bipolar affective disorder ([facility] )  Assessment & Plan  Continue home medications      Consultations During Hospital Stay:  IP CONSULT TO CASE MANAGEMENT  IP CONSULT TO CARDIOLOGY    Procedures Performed:     · none    Significant Findings:     · See hospital course and above    Imaging while in hospital:    Xr Chest 1 View Portable    Result Date: 3/20/2019  Narrative: CHEST INDICATION:   sob  COMPARISON:  None EXAM PERFORMED/VIEWS:  XR CHEST PORTABLE  AP semierect Images: 1 FINDINGS: Heart shadow is enlarged but unchanged from prior exam  The lungs are clear  No pneumothorax or pleural effusion  Osseous structures appear within normal limits for patient age  Impression: No acute cardiopulmonary disease  Workstation performed: [id number]     Xr Chest Portable- Icu    Result Date: 3/21/2019  Narrative: CHEST INDICATION:   respiratory failure  COMPARISON:  3/20/2019 EXAM PERFORMED/VIEWS:  XR CHEST PORTABLE ICU FINDINGS: Study somewhat limited by shallow depth of inspiration and patient body habitus  Cardiac silhouette enlarged as before    No focal infiltrate is seen, no pneumothorax or significant effusion  Pulmonary vasculature is somewhat prominent Osseous structures appear within normal limits for patient age  Impression: Enlargement of cardiac silhouette  Somewhat prominent pulmonary vasculature, correlate for clinical concern of pulmonary congestion No acute focal infiltrate is seen Workstation performed: [id number]       Incidental Findings:   · none    Test Results Pending at Discharge (will require follow up):   · As per After Visit Summary     Outpatient Tests Requested:  · None    Complications:  See hospital course and above    Hospital Course:     Anderson Toribio is a 60 y o  male patient who originally presented to the hospital on 3/20/2019 due to shortness of breath along with cough  He also reported occasional chest pain  In the ER he required BiPAP and was initially admitted to step-down unit  Patient was seen by Cardiology and Pulmonary while here  Patient treated with IV Solu-Medrol and medications were adjusted for better heart rate control  Patient is on CPAP at home along with 2 L of oxygen at night and transitioned to BiPAP on discharge  Patient also uses p r n  Oxygen at home during the day  His symptoms did improve while here  He was cleared by all consultants involved in his care prior to discharge      Please see above list of diagnoses and related plan for additional information  Condition at Discharge: stable     Discharge Day Visit / Exam:     Subjective:  States breathing has improved  Denies any chest pain    Vitals: Blood Pressure: 134/88 (03/26/19 0818)  Pulse: 100 (03/26/19 0818)  Temperature: (!) 97 3 °F (36 3 °C) (03/26/19 0818)  Temp Source: Tympanic (03/26/19 0818)  Respirations: 19 (03/26/19 0818)  Height: 5' 11" (180 3 cm) (03/21/19 2259)  Weight - Scale: 128 kg (282 lb 6 6 oz) (03/26/19 0600)  SpO2: 98 % (03/26/19 1107)  Exam:   Physical Exam   Constitutional: He is oriented to person, place, and time   He appears well-developed and well-nourished  No distress  Obese   HENT:   Head: Normocephalic and atraumatic  Eyes: EOM are normal  Right eye exhibits no discharge  Left eye exhibits no discharge  No scleral icterus  Cardiovascular:   Murmur heard  Irregularly, irregular rate and rhythm   Pulmonary/Chest: Effort normal  No respiratory distress  He has no wheezes  He has no rales  Decreased breath sounds bilaterally   Abdominal: Soft  Bowel sounds are normal  He exhibits no distension  There is no tenderness  Musculoskeletal: He exhibits edema  Neurological: He is alert and oriented to person, place, and time  No cranial nerve deficit  Skin: He is not diaphoretic  Psychiatric: He has a normal mood and affect  His behavior is normal        Discharge instructions/Information to patient and family:(Discharge Medications and Follow up):   See after visit summary for information provided to patient and family  Provisions for Follow-Up Care:  See after visit summary for information related to follow-up care and any pertinent home health orders  Disposition: Home    Planned Readmission:  No     Discharge Statement:  I spent > 30 minutes discharging the patient  This time was spent on the day of discharge  I had direct contact with the patient on the day of discharge  Greater than 50% of the total time was spent examining patient, answering all patient questions, arranging and discussing plan of care with patient as well as directly providing post-discharge instructions  Additional time then spent on discharge activities  Discharge Medications:  See after visit summary for reconciled discharge medications provided to patient and family  ** Please Note: "This note has been constructed using a voice recognition system  Therefore there may be syntax, spelling, and/or grammatical errors   Please call if you have any questions  "**

## 2019-03-26 NOTE — ASSESSMENT & PLAN NOTE
Likely precipitated by viral bronchitis  Procalcitonin level was negative  Patient was on IV Solu-Medrol which was tapered down as his symptoms improved  Transitioned to prednisone taper on discharge  Pulmonary follow-up after discharge   Patient finished 5 day course of azithromycin    Continue nebulizers

## 2019-03-26 NOTE — ASSESSMENT & PLAN NOTE
Likely secondary to COPD exacerbation  Heart rate is better controlled  Continue Cardizem and Lopressor dose was increased  Patient is on anticoagulation with Eliquis  Echo from [facility] office showed EF of 50 for 60% without any regional wall motion abnormalities or valvular abnormality and biatrial enlargement    Follow up with own cardiologist after discharge

## 2019-03-26 NOTE — PLAN OF CARE
Problem: RESPIRATORY - ADULT  Goal: Achieves optimal ventilation and oxygenation  Description  INTERVENTIONS:  - Assess for changes in respiratory status  - Assess for changes in mentation and behavior  - Position to facilitate oxygenation and minimize respiratory effort  - Oxygen administration by appropriate delivery method based on oxygen saturation (per order) or ABGs  - Initiate smoking cessation education as indicated  - Encourage broncho-pulmonary hygiene including cough, deep breathe, Incentive Spirometry  - Assess the need for suctioning and aspirate as needed  - Assess and instruct to report SOB or any respiratory difficulty  - Respiratory Therapy support as indicated  Outcome: Progressing     Problem: PAIN - ADULT  Goal: Verbalizes/displays adequate comfort level or baseline comfort level  Description  Interventions:  - Encourage patient to monitor pain and request assistance  - Assess pain using appropriate pain scale  - Administer analgesics based on type and severity of pain and evaluate response  - Implement non-pharmacological measures as appropriate and evaluate response  - Consider cultural and social influences on pain and pain management  - Notify physician/advanced practitioner if interventions unsuccessful or patient reports new pain  Outcome: Progressing     Problem: INFECTION - ADULT  Goal: Absence or prevention of progression during hospitalization  Description  INTERVENTIONS:  - Assess and monitor for signs and symptoms of infection  - Monitor lab/diagnostic results  - Monitor all insertion sites, i e  indwelling lines, tubes, and drains  - Monitor endotracheal (as able) and nasal secretions for changes in amount and color  - [place] appropriate cooling/warming therapies per order  - Administer medications as ordered  - Instruct and encourage patient and family to use good hand hygiene technique  - Identify and instruct in appropriate isolation precautions for identified infection/condition  Outcome: Progressing     Problem: SAFETY ADULT  Goal: Patient will remain free of falls  Description  INTERVENTIONS:  - Assess patient frequently for physical needs  -  Identify cognitive and physical deficits and behaviors that affect risk of falls    -  [place] fall precautions as indicated by assessment   - Educate patient/family on patient safety including physical limitations  - Instruct patient to call for assistance with activity based on assessment  - Modify environment to reduce risk of injury  - Consider OT/PT consult to assist with strengthening/mobility  Outcome: Progressing  Goal: Maintain or return to baseline ADL function  Description  INTERVENTIONS:  -  Assess patient's ability to carry out ADLs; assess patient's baseline for ADL function and identify physical deficits which impact ability to perform ADLs (bathing, care of mouth/teeth, toileting, grooming, dressing, etc )  - Assess/evaluate cause of self-care deficits   - Assess range of motion  - Assess patient's mobility; develop plan if impaired  - Assess patient's need for assistive devices and provide as appropriate  - Encourage maximum independence but intervene and supervise when necessary  ¯ Involve family in performance of ADLs  ¯ Assess for home care needs following discharge   ¯ Request OT consult to assist with ADL evaluation and planning for discharge  ¯ Provide patient education as appropriate  Outcome: Progressing  Goal: Maintain or return mobility status to optimal level  Description  INTERVENTIONS:  - Assess patient's baseline mobility status (ambulation, transfers, stairs, etc )    - Identify cognitive and physical deficits and behaviors that affect mobility  - Identify mobility aids required to assist with transfers and/or ambulation (gait belt, sit-to-stand, lift, walker, cane, etc )  - [place] fall precautions as indicated by assessment  - Record patient progress and toleration of activity level on Mobility SBAR; progress patient to next Phase/Stage  - Instruct patient to call for assistance with activity based on assessment  - Request Rehabilitation consult to assist with strengthening/weightbearing, etc   Outcome: Progressing     Problem: DISCHARGE PLANNING  Goal: Discharge to home or other facility with appropriate resources  Description  INTERVENTIONS:  - Identify barriers to discharge w/patient and caregiver  - Arrange for needed discharge resources and transportation as appropriate  - Identify discharge learning needs (meds, wound care, etc )  - Refer to Case Management Department for coordinating discharge planning if the patient needs post-hospital services based on physician/advanced practitioner order or complex needs related to functional status, cognitive ability, or social support system   Outcome: Progressing     Problem: Knowledge Deficit  Goal: Patient/family/caregiver demonstrates understanding of disease process, treatment plan, medications, and discharge instructions  Description  Complete learning assessment and assess knowledge base  Interventions:  - Provide teaching at level of understanding  - Provide teaching via preferred learning methods  Outcome: Progressing     Problem: CARDIOVASCULAR - ADULT  Goal: Maintains optimal cardiac output and hemodynamic stability  Description  INTERVENTIONS:  - Monitor I/O, vital signs and rhythm  - Monitor for S/S and trends of decreased cardiac output i e  bleeding, hypotension  - Administer and titrate ordered vasoactive medications to optimize hemodynamic stability  - Assess quality of pulses, skin color and temperature  - Assess for signs of decreased coronary artery perfusion - ex   Angina  - Instruct patient to report change in severity of symptoms  Outcome: Progressing  Goal: Absence of cardiac dysrhythmias or at baseline rhythm  Description  INTERVENTIONS:  - Continuous cardiac monitoring, monitor vital signs, obtain 12 lead EKG if indicated  - Administer antiarrhythmic and heart rate control medications as ordered  - Monitor electrolytes and administer replacement therapy as ordered  Outcome: Progressing     Problem: Potential for Falls  Goal: Patient will remain free of falls  Description  INTERVENTIONS:  - Assess patient frequently for physical needs  -  Identify cognitive and physical deficits and behaviors that affect risk of falls    -  [place] fall precautions as indicated by assessment   - Educate patient/family on patient safety including physical limitations  - Instruct patient to call for assistance with activity based on assessment  - Modify environment to reduce risk of injury  - Consider OT/PT consult to assist with strengthening/mobility  Outcome: Progressing     Problem: Prexisting or High Potential for Compromised Skin Integrity  Goal: Skin integrity is maintained or improved  Description  INTERVENTIONS:  - Identify patients at risk for skin breakdown  - Assess and monitor skin integrity  - Assess and monitor nutrition and hydration status  - Monitor labs (i e  albumin)  - Assess for incontinence   - Turn and reposition patient  - Assist with mobility/ambulation  - Relieve pressure over bony prominences  - Avoid friction and shearing  - Provide appropriate hygiene as needed including keeping skin clean and dry  - Evaluate need for skin moisturizer/barrier cream  - Collaborate with interdisciplinary team (i e  Nutrition, Rehabilitation, etc )   - Patient/family teaching  Outcome: Progressing     Problem: DISCHARGE PLANNING - CARE MANAGEMENT  Goal: Discharge to post-acute care or home with appropriate resources  Description  INTERVENTIONS:  - Conduct assessment to determine patient/family and health care team treatment goals, and need for post-acute services based on payer coverage, community resources, and patient preferences, and barriers to discharge  - Address psychosocial, clinical, and financial barriers to discharge as identified in assessment in conjunction with the patient/family and health care team  - Arrange appropriate level of post-acute services according to patient's   needs and preference and payer coverage in collaboration with the physician and health care team  - Communicate with and update the patient/family, physician, and health care team regarding progress on the discharge plan  - Arrange appropriate transportation to post-acute venues    Patient is independent  May need Bipap upon discharge  SW will follow for needs     Outcome: Progressing

## 2019-03-26 NOTE — PROGRESS NOTES
Progress Note - Pulmonary   Justin Marroquin 60 y o  male MRN: [id number]  Unit/Bed#: [address] [place] [address] Encounter: [id number]    Leo Esteban the 4year-old male with history of chronic bronchitis, history of SHAVONNE on CPAP 10 cm of water in 2 L oxygen at night, chronic hypoxemic respiratory failure, atrial fibrillation, severe ventilatory restrictions secondary to morbid obesity came in with acute exacerbation of chronic bronchitis and acute hypoxemic respiratory failure  Patient did well with BiPAP in the inpatient setting  Plan to transition patient to BiPAP in the outpatient setting  Patient qualifies for this  Patient has a [employer] Dream machine at home that can be transition from CPAP to BiPAP settings  Overall hospital course stabilized  Patient has been weaned down IV steroids at a dose able to be transitioned to p o  Assessment/Plan:       Chronic bronchitis with acute exacerbation (HCC)  Assessment & Plan  -pt w/ hx chronic cough and chronic bronchitis  -pt on trelegy triple therapy chronically > resume in the outpatient setting  -on ATC nebs available at home utilize t i d  And p r n   -wean prednisone to 40 mg daily x3 days and wean down every 3 days from 30, 20, 10,   -stable and ready for d/c from Pulmonary standpoint  -Pulmonary to sign off  -patient needs outpatient follow up within 10-14 days    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 [facility])  Assessment & Plan  -in patient with frequent recent hospitalizations namely for respiratory issues  -alveolar hypoventilation is likely secondary to obesity  -on BiPAP therapy here in hospital 12/5 40% patient maintains ABG results as listed below  -plan is to transition patient from CPAP therapy to BiPAP    Patient has a dream station which can be adjusted to this type of therapy through [facility] equipment   -patient will need 2 L bled into his BiPAP at night > settings should be 12/5  -Discussed with case management and they will follow up on contacting [employer]  -discussed with Dr Irma Shepherd of Medicine Team       Oh Francis has chronic respiratory failure due to COPD/and Alveolar Hypoventilation and will need BIPAP  ABG results ph 7 41 PCO2 46 4  Patient in the past has only utilized CPAP which is not alleviating his chronic hypercarbic respiratory failure  BiPAP is needed to increase his efficiency for work of breathing  Without this respiratory support, serious harm or death could occur  BiPAP would decrease in hospital readmission rate and improve overall quality of life  Restrictive ventilatory defect  Assessment & Plan  Patient with severe restrictive ventilatory defect with a FEV1/FVC ratio 72%  FEV1 1 5 L or 39% of predicted  Secondary to morbid obesity causative of hypoventilation syndrome    Esophageal reflux  Assessment & Plan  -continue PPI tx    SHAVONNE and COPD overlap syndrome ([address] )  Assessment & Plan  -patient is both chronically hypoxemic and on CPAP at home requiring 2 L nasal cannula oxygen / and is chronically on 10 cm of water CPAP  -patient also has chronic emphysematous disease  -w/ history of Severe restriction on spirometry w/ ratios of 70-72%  -FVC 41%  -FEV1 39%    * Acute on chronic respiratory failure with hypoxia (HCC)  Assessment & Plan  -chronically on 2 L NC at home  -clinically improved and returned to baseline  -likely 2/2 COPD exacerabation on top of baseline COPD and nocturnal hypoxemia and OAHS  -patient will also need to utilize 2 L oxygen with BiPAP at night        ______________________________________________________________________    Subjective: Pt seen and examined at bedside  No complaints  Patient feels his breathing is significantly improved  Improved cough  Does not feel dyspneic  He tolerates BiPAP very well at night    Utilized BiPAP last night 12/5    Tele Events:     Vitals:   Temp:  [97 °F (36 1 °C)-97 7 °F (36 5 °C)] 97 3 °F (36 3 °C)  HR: [] 100  Resp:  [18-20] 19  BP: ()/(68-95) 134/88  FiO2 (%):  [40] 40  Weight (last 2 days)     Date/Time   Weight    03/26/19 0600   128 (282 41)    03/25/19 0600   131 (289 31)    03/24/19 0554   134 (295 2)            Oxygen Therapy  SpO2: 98 %  O2 Flow Rate (L/min): 2 L/min    IV Infusions:       Nutrition:        Diet Orders   (From admission, onward)            Start     Ordered    03/22/19 1226  Room Service  Once     Question:  Type of Service  Answer:  Room Service-Appropriate    03/22/19 1225    03/20/19 1945  Diet Campos/CHO Controlled; Consistent Carbohydrate Diet Level 2 (5 carb servings/75 grams CHO/meal)  Diet effective now     Question Answer Comment   Diet Type Campos/CHO Controlled    Campos/CHO Controlled Consistent Carbohydrate Diet Level 2 (5 carb servings/75 grams CHO/meal)    RD to adjust diet per protocol? No        03/20/19 1944          Ins/Outs:   I/O       03/24 0701 - 03/25 0700 03/25 0701 - 03/26 0700 03/26 0701 - 03/27 0700    P  O  2930 1200     I V  (mL/kg) 10 (0 1)      Total Intake(mL/kg) 2940 (22 4) 1200 (9 4)     Urine (mL/kg/hr) 5875 (1 9) 4675 (1 5)     Total Output 5875 4675     Net -2935 -3475                  Lines/Drains:  Invasive Devices     Peripheral Intravenous Line            Peripheral IV 03/24/19 Left;Upper Arm 1 day                 Active medications:  Scheduled Meds:    Current Facility-Administered Medications:  ALPRAZolam 2 mg Oral HS PRN Violet Manan, CRNP   apixaban 5 mg Oral BID Violet Manan, CRNP   aspirin 81 mg Oral QAM Violet Manan, CRNP   benzonatate 200 mg Oral TID PRN Violet Manan, CRNP   busPIRone 15 mg Oral BID Violet Manan, CRNP   diltiazem 120 mg Oral Q12H [facility] Rahel Rodriguez DO   fenofibrate 160 mg Oral Daily [place] Manan, MANAS   insulin glargine 55 Units Subcutaneous HS Dustin Blanco MD   insulin lispro 10 Units Subcutaneous TID With Meals Dustin Blanco MD   insulin lispro 4-20 Units Subcutaneous 4x Daily (AC & HS) Kofi Pancoast, CRNP   ipratropium 0 5 mg Nebulization 4x Daily Kofi Pancoast, CRNP   And       levalbuterol 1 25 mg Nebulization 4x Daily Kofi Pancoast, CRNP   methocarbamol 500 mg Oral HS PRN Kofi Pancoast, CRNP   methylPREDNISolone sodium succinate 20 mg Intravenous Q12H [facility] Yas Osuna PA-C   metoprolol 5 mg Intravenous Q6H PRN Kofi Pancoast, CRNP   metoprolol tartrate 100 mg Oral Q12H [facility] Tony Flood MD   pantoprazole 40 mg Oral Early Morning Kofi Pancoast, CRNP   pregabalin 50 mg Oral TID Kofi Pancoast, CRNP   QUEtiapine 400 mg Oral HS Kofi Pancoast, CRNP   venlafaxine 150 mg Oral QAM Kofi Pancoast, CRNP     PRN Meds:    ALPRAZolam 2 mg HS PRN   benzonatate 200 mg TID PRN   methocarbamol 500 mg HS PRN   metoprolol 5 mg Q6H PRN     ____________________________________________________________________      Physical Exam   Constitutional: He is oriented to person, place, and time  He appears well-developed  Morbidly obese body habitus   HENT:   Head: Normocephalic and atraumatic  Eyes: Pupils are equal, round, and reactive to light  Conjunctivae are normal    Neck: Normal range of motion  Neck supple  Large neck circumference   Cardiovascular: Normal rate, regular rhythm and normal heart sounds  Exam reveals no gallop and no friction rub  No murmur heard  Pulmonary/Chest: Effort normal  No accessory muscle usage  No tachypnea  No respiratory distress  He has decreased breath sounds in the right middle field, the right lower field, the left middle field and the left lower field  He has no wheezes  He has no rales  He exhibits no tenderness  Chronic mild-to-moderate decreased breath sounds in the middle and lower lung fields    This is the patient's baseline   Abdominal: Soft  Bowel sounds are normal    Protuberant abdomen   Musculoskeletal: Normal range of motion  He exhibits no edema  Right lower leg: He exhibits no edema  Left lower leg: He exhibits no edema  Neurological: He is alert and oriented to person, place, and time  Skin: Skin is warm and dry  Psychiatric: He has a normal mood and affect            ____________________________________________________________________    Labs:   CBC:   Results from last 7 days   Lab Units 03/26/19  0503 03/24/19  0551 03/22/19  0540   WBC Thousand/uL 13 86* 12 39* 13 10*   HEMOGLOBIN g/dL 15 1 14 6 14 1   HEMATOCRIT % 47 1 44 5 43 7   MCV fL 91 91 92   PLATELETS Thousands/uL 217 195 202     CMP:   Results from last 7 days   Lab Units 03/26/19  0503 03/24/19  0551 03/22/19  0540  03/20/19  1445   POTASSIUM mmol/L 4 5 4 4 4 5   < > 4 0   CHLORIDE mmol/L 99* 99* 98*   < > 101   CO2 mmol/L 29 33* 29   < > 29   BUN mg/dL 28* 25 28*   < > 18   CREATININE mg/dL 1 03 1 00 1 09   < > 1 03   CALCIUM mg/dL 9 4 9 4 9 7   < > 9 4   AST U/L  --   --  11  --  14   ALT U/L  --   --  32  --  35   ALK PHOS U/L  --   --  48  --  72   EGFR ml/min/1 73sq m 79 82 74   < > 79    < > = values in this interval not displayed  No components found for: ABG    Magnesium:   Results from last 7 days   Lab Units 03/22/19  0540   MAGNESIUM mg/dL 2 1     Phosphorous:   Results from last 7 days   Lab Units 03/21/19  0550   PHOSPHORUS mg/dL 3 5     Troponin:   Results from last 7 days   Lab Units 03/20/19 2049 03/20/19  1445   TROPONIN I ng/mL 0 02 0 03     PT/INR:     Lactic Acid:     BNP:   Results from last 7 days   Lab Units 03/20/19  1445   NT-PRO BNP pg/mL 855*     TSH:     Procalcitonin: Invalid input(s): PROCALCITONIN      Imaging:   XR chest portable- ICU   Final Result by Mert Corrigan MD (03/21 1014)      Enlargement of cardiac silhouette  Somewhat prominent pulmonary vasculature, correlate for clinical concern of pulmonary congestion      No acute focal infiltrate is seen            Workstation performed: [id number]         XR chest 1 view portable   Final Result by Winnie Satnton MD (03/20 1513)      No acute cardiopulmonary disease  Workstation performed: [id number]               Micro:   Lab Results   Component Value Date    BLOODCX No Growth After 5 Days  11/16/2018    BLOODCX Staphylococcus coagulase negative (A) 11/16/2018    BLOODCX No Growth After 5 Days  10/31/2018    URINECX 5204-4062 cfu/ml Mixed Contaminants X2 03/20/2017    URINECX No Growth <1000 cfu/mL 11/27/2016    URINECX No Growth <1000 cfu/mL 09/02/2016    SPUTUMCULTUR Test not performed  Suggest repeat specimen  03/21/2019    SPUTUMCULTUR Test not performed  Suggest repeat specimen   11/08/2017    SPUTUMCULTUR 1+ Growth of Staphylococcus aureus 03/20/2017    SPUTUMCULTUR 1+ Growth of Mixed Respiratory Francine 03/20/2017    MRSACULTURE  03/20/2019     No Methicillin Resistant Staphlyococcus aureus (MRSA) isolated            Invalid input(s): LEGIONELLAURINARYANTIGEN        Code Status: Level 1 - Full Code

## 2019-03-26 NOTE — ASSESSMENT & PLAN NOTE
Resolved  Likely secondary COPD exacerbation  Patient is currently stable on room air  He uses 2 L of oxygen at night and p r n   During the day chronically

## 2019-03-26 NOTE — ASSESSMENT & PLAN NOTE
Lab Results   Component Value Date    HGBA1C 8 4 (A) 02/18/2019       Recent Labs     03/25/19  1643 03/25/19  2049 03/26/19  0729 03/26/19  1110   POCGLU 253* 265* 143* 203*     Blood sugars not well control likely secondary to steroids  Continue home medications and follow up with PCP after discharge

## 2019-03-26 NOTE — ASSESSMENT & PLAN NOTE
Patient with severe restrictive ventilatory defect with a FEV1/FVC ratio 72%  FEV1 1 5 L or 39% of predicted  Secondary to morbid obesity causative of hypoventilation syndrome

## 2019-03-26 NOTE — PROGRESS NOTES
Progress Note - Cardiology   [facility]     Mara Weathers 60 y o  male MRN: [id number]  : 1960  Unit/Bed#: [address] [place] [address] Encounter: [id number]    Assessment and Plan:   1  Acute on chronic hypoxic respiratory failure due to tracheobronchitis and underlying COPD:  Slowly improving  Care per pulmonology  2  Chronic atrial fibrillation:  Rate improved with addition of Cardizem  Will transition to q 12 hours dosing today and continue to monitor  3  Essential hypertension:  Blood pressure stable    4  Dyslipidemia: continue Mevacor    5  Obstructive sleep apnea:  Using CPAP    6  Nonobstructive coronary artery disease on catheterization in 2016    7  Obesity with BMI of approximately 40:  Encourage weight loss     Subjective / Objective:   Patient seen examined  He states that he is feeling much better  Rates in the 70s to low 90s with rest, 90s to low 100s with physical activity  Feels his breathing has improved  He denies any chest pain, pressure or palpitations    Vitals: Blood pressure 134/88, pulse 100, temperature (!) 97 3 °F (36 3 °C), temperature source Tympanic, resp  rate 19, height 5' 11" (1 803 m), weight 128 kg (282 lb 6 6 oz), SpO2 95 %  Vitals:    19 0600 19 0600   Weight: 131 kg (289 lb 5 oz) 128 kg (282 lb 6 6 oz)     Body mass index is 39 39 kg/m²  BP Readings from Last 3 Encounters:   19 134/88   19 120/90   18 109/84     Orthostatic Blood Pressures      Most Recent Value   Blood Pressure  134/88 filed at 2019 0818   Patient Position - Orthostatic VS  Lying filed at 2019 0818        I/O        07 -  0700  07 -  0700  07 -  0700    P  O  2930 1200     I V  (mL/kg) 10 (0 1)      Total Intake(mL/kg) 2940 (22 4) 1200 (9 4)     Urine (mL/kg/hr) 5875 (1 9) 4675 (1 5)     Total Output 5875 4675     Net -7184 -1518                Invasive Devices     Peripheral Intravenous Line Peripheral IV 03/24/19 Left;Upper Arm 1 day                  Intake/Output Summary (Last 24 hours) at 3/26/2019 0825  Last data filed at 3/26/2019 0315  Gross per 24 hour   Intake 1200 ml   Output 4675 ml   Net -3475 ml         Physical Exam:   Physical Exam   Constitutional: He is oriented to person, place, and time  He appears well-developed and well-nourished  No distress  HENT:   Head: Normocephalic  Right Ear: External ear normal    Left Ear: External ear normal    Eyes: Pupils are equal, round, and reactive to light  Conjunctivae are normal  Right eye exhibits no discharge  Left eye exhibits no discharge  No scleral icterus  Neck: Normal range of motion  Neck supple  No JVD present  No thyromegaly present  Cardiovascular: Normal rate, intact distal pulses and normal pulses  An irregular rhythm present  Occasional extrasystoles are present  Murmur heard  Systolic murmur is present with a grade of 1/6  Pulmonary/Chest: Effort normal  No accessory muscle usage  No respiratory distress  He has no wheezes  Course tubular breath sounds   Abdominal: Soft  Bowel sounds are normal    Musculoskeletal: He exhibits edema  Mild ankle and pretibial edema   Neurological: He is alert and oriented to person, place, and time  Skin: Skin is warm and dry  He is not diaphoretic  Psychiatric: He has a normal mood and affect  Nursing note and vitals reviewed              Medications/ Allergies:     Current Facility-Administered Medications:  ALPRAZolam 2 mg Oral HS PRN Rachel Oleary, CRNP   apixaban 5 mg Oral BID Rachel Oleary, CRNP   aspirin 81 mg Oral QAM Rachel Oleary, CRNP   benzonatate 200 mg Oral TID PRN Rachel Oleary, CRNP   busPIRone 15 mg Oral BID Rachelirina Oleary, CRNP   diltiazem 120 mg Oral Q12H [address] Rahel Rodriguez DO   fenofibrate 160 mg Oral Daily BETTIE LawsonNP   insulin glargine 55 Units Subcutaneous HS Jovanni De Leon MD   insulin lispro 10 Units Subcutaneous TID With Meals Vicky Harmon MD   insulin lispro 4-20 Units Subcutaneous 4x Daily (AC & HS) MANAS Bosch   ipratropium 0 5 mg Nebulization 4x Daily MANAS Bosch   And       levalbuterol 1 25 mg Nebulization 4x Daily MANAS Bosch   methocarbamol 500 mg Oral HS PRN MANAS Bosch   methylPREDNISolone sodium succinate 20 mg Intravenous Q12H [address] Samia Klein PA-C   metoprolol 5 mg Intravenous Q6H PRN MANAS Bosch   metoprolol tartrate 100 mg Oral Q12H [address] Leopoldo Boas, MD   pantoprazole 40 mg Oral Early Morning MANAS Bosch   pregabalin 50 mg Oral TID MANAS Bosch   QUEtiapine 400 mg Oral HS MANAS Bosch   venlafaxine 150 mg Oral QAM MANAS Bosch       ALPRAZolam 2 mg HS PRN   benzonatate 200 mg TID PRN   methocarbamol 500 mg HS PRN   metoprolol 5 mg Q6H PRN     Allergies   Allergen Reactions    Wellbutrin [Bupropion] Other (See Comments)     Alteration with hearing and other senses       VTE Pharmacologic Prophylaxis:   Sequential compression device (Venodyne)     Labs:   Troponins:  Results from last 7 days   Lab Units 03/20/19  2049 03/20/19  1445   TROPONIN I ng/mL 0 02 0 03     CBC with diff:  Results from last 7 days   Lab Units 03/26/19  0503 03/24/19  0551 03/22/19  0540 03/21/19  0550 03/20/19  1445   WBC Thousand/uL 13 86* 12 39* 13 10* 11 27* 9 83   HEMOGLOBIN g/dL 15 1 14 6 14 1 14 1 14 7   HEMATOCRIT % 47 1 44 5 43 7 43 9 44 2   MCV fL 91 91 92 93 91   PLATELETS Thousands/uL 217 195 202 199 198   MCH pg 29 2 29 8 29 7 29 9 30 2   MCHC g/dL 32 1 32 8 32 3 32 1 33 3   RDW % 12 6 12 5 12 9 13 0 12 9   MPV fL 10 3 10 3 10 1 10 1 9 9   NRBC AUTO /100 WBCs 0 0 0 0 0     CMP:  Results from last 7 days   Lab Units 03/26/19  0503 03/24/19  0551 03/22/19  0540 03/21/19  0550 03/20/19  1445   SODIUM mmol/L 135* 137 137 138 138   POTASSIUM mmol/L 4 5 4 4 4 5 4 6 4 0   CHLORIDE mmol/L 99* 99* 98* 102 101   CO2 mmol/L 29 33* 29 26 29   ANION GAP mmol/L 7 5 10 10 8   BUN mg/dL 28* 25 28* 29* 18   CREATININE mg/dL 1 03 1 00 1 09 1 02 1 03   CALCIUM mg/dL 9 4 9 4 9 7 9 4 9 4   AST U/L  --   --  11  --  14   ALT U/L  --   --  32  --  35   ALK PHOS U/L  --   --  48  --  72   TOTAL PROTEIN g/dL  --   --  6 6  --  7 1   ALBUMIN g/dL  --   --  3 4*  --  3 7   TOTAL BILIRUBIN mg/dL  --   --  0 40  --  0 30   EGFR ml/min/1 73sq m 79 82 74 80 79     Magnesium:  Results from last 7 days   Lab Units 03/22/19  0540 03/21/19  0550   MAGNESIUM mg/dL 2 1 1 8       Imaging & Testing   I have personally reviewed pertinent reports  Xr Chest 1 View Portable    Result Date: 3/20/2019  Narrative: CHEST INDICATION:   sob  COMPARISON:  None EXAM PERFORMED/VIEWS:  XR CHEST PORTABLE  AP semierect Images: 1 FINDINGS: Heart shadow is enlarged but unchanged from prior exam  The lungs are clear  No pneumothorax or pleural effusion  Osseous structures appear within normal limits for patient age  Impression: No acute cardiopulmonary disease  Workstation performed: [id number]     Xr Chest Portable- Icu    Result Date: 3/21/2019  Narrative: CHEST INDICATION:   respiratory failure  COMPARISON:  3/20/2019 EXAM PERFORMED/VIEWS:  XR CHEST PORTABLE ICU FINDINGS: Study somewhat limited by shallow depth of inspiration and patient body habitus  Cardiac silhouette enlarged as before  No focal infiltrate is seen, no pneumothorax or significant effusion  Pulmonary vasculature is somewhat prominent Osseous structures appear within normal limits for patient age  Impression: Enlargement of cardiac silhouette  Somewhat prominent pulmonary vasculature, correlate for clinical concern of pulmonary congestion No acute focal infiltrate is seen Workstation performed: [id number]        EKG / Monitor: Personally reviewed  Atrial fibrillation              Hiwot Hippo        "This note has been constructed using a voice recognition system  Therefore there may be syntax, spelling, and/or grammatical errors   Please call if you have any questions  "

## 2019-03-27 ENCOUNTER — TRANSITIONAL CARE MANAGEMENT (OUTPATIENT)
Dept: FAMILY MEDICINE CLINIC | Facility: CLINIC | Age: 59
End: 2019-03-27

## 2019-04-19 ENCOUNTER — TRANSCRIBE ORDERS (OUTPATIENT)
Dept: LAB | Facility: CLINIC | Age: 59
End: 2019-04-19

## 2019-04-19 ENCOUNTER — OFFICE VISIT (OUTPATIENT)
Dept: FAMILY MEDICINE CLINIC | Facility: CLINIC | Age: 59
End: 2019-04-19
Payer: MEDICARE

## 2019-04-19 ENCOUNTER — APPOINTMENT (OUTPATIENT)
Dept: LAB | Facility: CLINIC | Age: 59
End: 2019-04-19
Payer: MEDICARE

## 2019-04-19 DIAGNOSIS — E08.42 DIABETIC POLYNEUROPATHY ASSOCIATED WITH DIABETES MELLITUS DUE TO UNDERLYING CONDITION (HCC): ICD-10-CM

## 2019-04-19 DIAGNOSIS — J44.9 OBSTRUCTIVE CHRONIC BRONCHITIS WITHOUT EXACERBATION (HCC): ICD-10-CM

## 2019-04-19 DIAGNOSIS — E11.65 UNCONTROLLED TYPE 2 DIABETES MELLITUS WITH HYPERGLYCEMIA (HCC): Primary | Chronic | ICD-10-CM

## 2019-04-19 DIAGNOSIS — J43.2 CENTRILOBULAR EMPHYSEMA (HCC): ICD-10-CM

## 2019-04-19 DIAGNOSIS — E55.9 AVITAMINOSIS D: ICD-10-CM

## 2019-04-19 DIAGNOSIS — E66.01 CLASS 2 SEVERE OBESITY DUE TO EXCESS CALORIES WITH SERIOUS COMORBIDITY AND BODY MASS INDEX (BMI) OF 39.0 TO 39.9 IN ADULT (HCC): ICD-10-CM

## 2019-04-19 DIAGNOSIS — J44.9 CHRONIC OBSTRUCTIVE PULMONARY DISEASE, UNSPECIFIED COPD TYPE (HCC): ICD-10-CM

## 2019-04-19 DIAGNOSIS — E66.01 MORBID OBESITY (HCC): ICD-10-CM

## 2019-04-19 DIAGNOSIS — E78.2 MIXED HYPERLIPIDEMIA: ICD-10-CM

## 2019-04-19 DIAGNOSIS — I48.91 ATRIAL FIBRILLATION WITH RVR (HCC): ICD-10-CM

## 2019-04-19 DIAGNOSIS — E78.2 MIXED HYPERLIPIDEMIA: Primary | ICD-10-CM

## 2019-04-19 DIAGNOSIS — E04.1 NONTOXIC UNINODULAR GOITER: ICD-10-CM

## 2019-04-19 LAB
25(OH)D3 SERPL-MCNC: 31.5 NG/ML (ref 30–100)
ALBUMIN SERPL BCP-MCNC: 4.2 G/DL (ref 3.5–5)
ALP SERPL-CCNC: 50 U/L (ref 46–116)
ALT SERPL W P-5'-P-CCNC: 36 U/L (ref 12–78)
ANION GAP SERPL CALCULATED.3IONS-SCNC: 8 MMOL/L (ref 4–13)
AST SERPL W P-5'-P-CCNC: 21 U/L (ref 5–45)
BACTERIA UR QL AUTO: ABNORMAL /HPF
BASOPHILS # BLD AUTO: 0.03 THOUSANDS/ΜL (ref 0–0.1)
BASOPHILS NFR BLD AUTO: 0 % (ref 0–1)
BILIRUB DIRECT SERPL-MCNC: 0.2 MG/DL (ref 0–0.2)
BILIRUB SERPL-MCNC: 0.65 MG/DL (ref 0.2–1)
BILIRUB UR QL STRIP: NEGATIVE
BUN SERPL-MCNC: 23 MG/DL (ref 5–25)
CALCIUM SERPL-MCNC: 9.4 MG/DL (ref 8.3–10.1)
CHLORIDE SERPL-SCNC: 102 MMOL/L (ref 100–108)
CHOLEST SERPL-MCNC: 139 MG/DL (ref 50–200)
CLARITY UR: CLEAR
CO2 SERPL-SCNC: 26 MMOL/L (ref 21–32)
COLOR UR: YELLOW
CREAT SERPL-MCNC: 1.23 MG/DL (ref 0.6–1.3)
CREAT UR-MCNC: 39.6 MG/DL
EOSINOPHIL # BLD AUTO: 0.12 THOUSAND/ΜL (ref 0–0.61)
EOSINOPHIL NFR BLD AUTO: 2 % (ref 0–6)
ERYTHROCYTE [DISTWIDTH] IN BLOOD BY AUTOMATED COUNT: 14 % (ref 11.6–15.1)
EST. AVERAGE GLUCOSE BLD GHB EST-MCNC: 192 MG/DL
GFR SERPL CREATININE-BSD FRML MDRD: 64 ML/MIN/1.73SQ M
GGT SERPL-CCNC: 44 U/L (ref 5–85)
GLUCOSE P FAST SERPL-MCNC: 167 MG/DL (ref 65–99)
GLUCOSE UR STRIP-MCNC: ABNORMAL MG/DL
HBA1C MFR BLD: 8.3 % (ref 4.2–6.3)
HCT VFR BLD AUTO: 49.4 % (ref 36.5–49.3)
HDLC SERPL-MCNC: 47 MG/DL (ref 40–60)
HGB BLD-MCNC: 16.3 G/DL (ref 12–17)
HGB UR QL STRIP.AUTO: NEGATIVE
HYALINE CASTS #/AREA URNS LPF: ABNORMAL /LPF
IMM GRANULOCYTES # BLD AUTO: 0.03 THOUSAND/UL (ref 0–0.2)
IMM GRANULOCYTES NFR BLD AUTO: 0 % (ref 0–2)
KETONES UR STRIP-MCNC: NEGATIVE MG/DL
LDLC SERPL CALC-MCNC: 26 MG/DL (ref 0–100)
LEUKOCYTE ESTERASE UR QL STRIP: ABNORMAL
LYMPHOCYTES # BLD AUTO: 3.32 THOUSANDS/ΜL (ref 0.6–4.47)
LYMPHOCYTES NFR BLD AUTO: 46 % (ref 14–44)
MAGNESIUM SERPL-MCNC: 2.2 MG/DL (ref 1.6–2.6)
MCH RBC QN AUTO: 30.1 PG (ref 26.8–34.3)
MCHC RBC AUTO-ENTMCNC: 33 G/DL (ref 31.4–37.4)
MCV RBC AUTO: 91 FL (ref 82–98)
MICROALBUMIN UR-MCNC: <5 MG/L (ref 0–20)
MICROALBUMIN/CREAT 24H UR: <13 MG/G CREATININE (ref 0–30)
MONOCYTES # BLD AUTO: 0.58 THOUSAND/ΜL (ref 0.17–1.22)
MONOCYTES NFR BLD AUTO: 8 % (ref 4–12)
NEUTROPHILS # BLD AUTO: 3.23 THOUSANDS/ΜL (ref 1.85–7.62)
NEUTS SEG NFR BLD AUTO: 44 % (ref 43–75)
NITRITE UR QL STRIP: NEGATIVE
NON-SQ EPI CELLS URNS QL MICRO: ABNORMAL /HPF
NONHDLC SERPL-MCNC: 92 MG/DL
NRBC BLD AUTO-RTO: 0 /100 WBCS
PH UR STRIP.AUTO: 6 [PH]
PHOSPHATE SERPL-MCNC: 3.7 MG/DL (ref 2.7–4.5)
PLATELET # BLD AUTO: 176 THOUSANDS/UL (ref 149–390)
PMV BLD AUTO: 10.7 FL (ref 8.9–12.7)
POTASSIUM SERPL-SCNC: 4.1 MMOL/L (ref 3.5–5.3)
PROT SERPL-MCNC: 7.6 G/DL (ref 6.4–8.2)
PROT UR STRIP-MCNC: NEGATIVE MG/DL
RBC # BLD AUTO: 5.41 MILLION/UL (ref 3.88–5.62)
RBC #/AREA URNS AUTO: ABNORMAL /HPF
SODIUM SERPL-SCNC: 136 MMOL/L (ref 136–145)
SP GR UR STRIP.AUTO: 1.02 (ref 1–1.03)
T4 FREE SERPL-MCNC: 0.87 NG/DL (ref 0.76–1.46)
T4 SERPL-MCNC: 6.4 UG/DL (ref 4.7–13.3)
TRIGL SERPL-MCNC: 332 MG/DL
TSH SERPL DL<=0.05 MIU/L-ACNC: 1.49 UIU/ML (ref 0.36–3.74)
UROBILINOGEN UR QL STRIP.AUTO: 1 E.U./DL
WBC # BLD AUTO: 7.31 THOUSAND/UL (ref 4.31–10.16)
WBC #/AREA URNS AUTO: ABNORMAL /HPF

## 2019-04-19 PROCEDURE — 82977 ASSAY OF GGT: CPT

## 2019-04-19 PROCEDURE — 80061 LIPID PANEL: CPT | Performed by: INTERNAL MEDICINE

## 2019-04-19 PROCEDURE — 82570 ASSAY OF URINE CREATININE: CPT | Performed by: INTERNAL MEDICINE

## 2019-04-19 PROCEDURE — 80048 BASIC METABOLIC PNL TOTAL CA: CPT | Performed by: INTERNAL MEDICINE

## 2019-04-19 PROCEDURE — 83735 ASSAY OF MAGNESIUM: CPT | Performed by: INTERNAL MEDICINE

## 2019-04-19 PROCEDURE — 81001 URINALYSIS AUTO W/SCOPE: CPT | Performed by: INTERNAL MEDICINE

## 2019-04-19 PROCEDURE — 84100 ASSAY OF PHOSPHORUS: CPT | Performed by: INTERNAL MEDICINE

## 2019-04-19 PROCEDURE — 82043 UR ALBUMIN QUANTITATIVE: CPT | Performed by: INTERNAL MEDICINE

## 2019-04-19 PROCEDURE — 99214 OFFICE O/P EST MOD 30 MIN: CPT | Performed by: FAMILY MEDICINE

## 2019-04-19 PROCEDURE — 84443 ASSAY THYROID STIM HORMONE: CPT | Performed by: INTERNAL MEDICINE

## 2019-04-19 PROCEDURE — 85025 COMPLETE CBC W/AUTO DIFF WBC: CPT | Performed by: INTERNAL MEDICINE

## 2019-04-19 PROCEDURE — 84439 ASSAY OF FREE THYROXINE: CPT

## 2019-04-19 PROCEDURE — 83036 HEMOGLOBIN GLYCOSYLATED A1C: CPT | Performed by: INTERNAL MEDICINE

## 2019-04-19 PROCEDURE — 82306 VITAMIN D 25 HYDROXY: CPT | Performed by: INTERNAL MEDICINE

## 2019-04-19 PROCEDURE — 80076 HEPATIC FUNCTION PANEL: CPT

## 2019-04-19 PROCEDURE — 36415 COLL VENOUS BLD VENIPUNCTURE: CPT | Performed by: INTERNAL MEDICINE

## 2019-04-19 RX ORDER — DILTIAZEM HYDROCHLORIDE 120 MG/1
120 CAPSULE, EXTENDED RELEASE ORAL EVERY 12 HOURS SCHEDULED
Qty: 180 CAPSULE | Refills: 0 | Status: SHIPPED | OUTPATIENT
Start: 2019-04-19 | End: 2019-06-11

## 2019-05-07 ENCOUNTER — OFFICE VISIT (OUTPATIENT)
Dept: PULMONOLOGY | Facility: MEDICAL CENTER | Age: 59
End: 2019-05-07
Payer: MEDICARE

## 2019-05-07 VITALS
RESPIRATION RATE: 12 BRPM | OXYGEN SATURATION: 96 % | WEIGHT: 297 LBS | HEIGHT: 68 IN | HEART RATE: 94 BPM | SYSTOLIC BLOOD PRESSURE: 116 MMHG | BODY MASS INDEX: 45.01 KG/M2 | TEMPERATURE: 97.9 F | DIASTOLIC BLOOD PRESSURE: 82 MMHG

## 2019-05-07 DIAGNOSIS — J42 CHRONIC BRONCHITIS WITH ACUTE EXACERBATION (HCC): ICD-10-CM

## 2019-05-07 DIAGNOSIS — R06.02 SOB (SHORTNESS OF BREATH) ON EXERTION: Primary | ICD-10-CM

## 2019-05-07 DIAGNOSIS — J43.2 CENTRILOBULAR EMPHYSEMA (HCC): ICD-10-CM

## 2019-05-07 DIAGNOSIS — E66.01 MORBID OBESITY (HCC): ICD-10-CM

## 2019-05-07 DIAGNOSIS — J44.9 OSA AND COPD OVERLAP SYNDROME (HCC): ICD-10-CM

## 2019-05-07 DIAGNOSIS — R06.02 SHORTNESS OF BREATH: ICD-10-CM

## 2019-05-07 DIAGNOSIS — J20.9 CHRONIC BRONCHITIS WITH ACUTE EXACERBATION (HCC): ICD-10-CM

## 2019-05-07 DIAGNOSIS — R94.2 RESTRICTIVE VENTILATORY DEFECT: ICD-10-CM

## 2019-05-07 DIAGNOSIS — G47.33 OSA AND COPD OVERLAP SYNDROME (HCC): ICD-10-CM

## 2019-05-07 PROCEDURE — 99214 OFFICE O/P EST MOD 30 MIN: CPT | Performed by: PHYSICIAN ASSISTANT

## 2019-05-07 PROCEDURE — 94010 BREATHING CAPACITY TEST: CPT | Performed by: PHYSICIAN ASSISTANT

## 2019-05-07 RX ORDER — QUETIAPINE FUMARATE 400 MG/1
TABLET, FILM COATED ORAL
Refills: 1 | COMMUNITY
Start: 2019-05-03 | End: 2019-06-11

## 2019-05-07 RX ORDER — DILTIAZEM HYDROCHLORIDE 360 MG/1
360 CAPSULE, EXTENDED RELEASE ORAL DAILY
Refills: 0 | COMMUNITY
Start: 2019-05-01 | End: 2019-07-01 | Stop reason: HOSPADM

## 2019-05-07 RX ORDER — QUETIAPINE 200 MG/1
200 TABLET, FILM COATED, EXTENDED RELEASE ORAL EVERY MORNING
Refills: 1 | COMMUNITY
Start: 2019-05-03 | End: 2022-02-07 | Stop reason: CLARIF

## 2019-06-11 ENCOUNTER — HOSPITAL ENCOUNTER (EMERGENCY)
Facility: HOSPITAL | Age: 59
End: 2019-06-12
Attending: EMERGENCY MEDICINE | Admitting: EMERGENCY MEDICINE
Payer: MEDICARE

## 2019-06-11 ENCOUNTER — APPOINTMENT (EMERGENCY)
Dept: RADIOLOGY | Facility: HOSPITAL | Age: 59
End: 2019-06-11
Payer: MEDICARE

## 2019-06-11 DIAGNOSIS — R45.851 SUICIDAL IDEATIONS: ICD-10-CM

## 2019-06-11 DIAGNOSIS — F32.A DEPRESSION: Primary | ICD-10-CM

## 2019-06-11 LAB
ALBUMIN SERPL BCP-MCNC: 4.7 G/DL (ref 3.5–5)
ALP SERPL-CCNC: 61 U/L (ref 46–116)
ALT SERPL W P-5'-P-CCNC: 33 U/L (ref 12–78)
AMPHETAMINES SERPL QL SCN: NEGATIVE
ANION GAP SERPL CALCULATED.3IONS-SCNC: 15 MMOL/L (ref 4–13)
AST SERPL W P-5'-P-CCNC: 29 U/L (ref 5–45)
BACTERIA UR QL AUTO: ABNORMAL /HPF
BARBITURATES UR QL: NEGATIVE
BASOPHILS # BLD AUTO: 0.05 THOUSANDS/ΜL (ref 0–0.1)
BASOPHILS NFR BLD AUTO: 0 % (ref 0–1)
BENZODIAZ UR QL: POSITIVE
BILIRUB SERPL-MCNC: 0.8 MG/DL (ref 0.2–1)
BILIRUB UR QL STRIP: NEGATIVE
BUN SERPL-MCNC: 25 MG/DL (ref 5–25)
CALCIUM SERPL-MCNC: 9.8 MG/DL (ref 8.3–10.1)
CHLORIDE SERPL-SCNC: 95 MMOL/L (ref 100–108)
CLARITY UR: CLEAR
CO2 SERPL-SCNC: 26 MMOL/L (ref 21–32)
COCAINE UR QL: NEGATIVE
COLOR UR: ABNORMAL
CREAT SERPL-MCNC: 1.49 MG/DL (ref 0.6–1.3)
EOSINOPHIL # BLD AUTO: 0.05 THOUSAND/ΜL (ref 0–0.61)
EOSINOPHIL NFR BLD AUTO: 0 % (ref 0–6)
ERYTHROCYTE [DISTWIDTH] IN BLOOD BY AUTOMATED COUNT: 13.5 % (ref 11.6–15.1)
ETHANOL SERPL-MCNC: <3 MG/DL (ref 0–3)
GFR SERPL CREATININE-BSD FRML MDRD: 51 ML/MIN/1.73SQ M
GLUCOSE SERPL-MCNC: 214 MG/DL (ref 65–140)
GLUCOSE SERPL-MCNC: 225 MG/DL (ref 65–140)
GLUCOSE SERPL-MCNC: 240 MG/DL (ref 65–140)
GLUCOSE UR STRIP-MCNC: ABNORMAL MG/DL
HCT VFR BLD AUTO: 55.2 % (ref 36.5–49.3)
HGB BLD-MCNC: 18.6 G/DL (ref 12–17)
HGB UR QL STRIP.AUTO: NEGATIVE
HOLD SPECIMEN: NORMAL
IMM GRANULOCYTES # BLD AUTO: 0.07 THOUSAND/UL (ref 0–0.2)
IMM GRANULOCYTES NFR BLD AUTO: 1 % (ref 0–2)
KETONES UR STRIP-MCNC: NEGATIVE MG/DL
LEUKOCYTE ESTERASE UR QL STRIP: ABNORMAL
LYMPHOCYTES # BLD AUTO: 4.25 THOUSANDS/ΜL (ref 0.6–4.47)
LYMPHOCYTES NFR BLD AUTO: 35 % (ref 14–44)
MCH RBC QN AUTO: 31.2 PG (ref 26.8–34.3)
MCHC RBC AUTO-ENTMCNC: 33.7 G/DL (ref 31.4–37.4)
MCV RBC AUTO: 93 FL (ref 82–98)
METHADONE UR QL: NEGATIVE
MONOCYTES # BLD AUTO: 0.62 THOUSAND/ΜL (ref 0.17–1.22)
MONOCYTES NFR BLD AUTO: 5 % (ref 4–12)
NEUTROPHILS # BLD AUTO: 6.99 THOUSANDS/ΜL (ref 1.85–7.62)
NEUTS SEG NFR BLD AUTO: 59 % (ref 43–75)
NITRITE UR QL STRIP: NEGATIVE
NON-SQ EPI CELLS URNS QL MICRO: ABNORMAL /HPF
NRBC BLD AUTO-RTO: 0 /100 WBCS
NT-PROBNP SERPL-MCNC: 469 PG/ML
OPIATES UR QL SCN: NEGATIVE
PCP UR QL: NEGATIVE
PH UR STRIP.AUTO: 5.5 [PH]
PLATELET # BLD AUTO: 246 THOUSANDS/UL (ref 149–390)
PMV BLD AUTO: 10.3 FL (ref 8.9–12.7)
POTASSIUM SERPL-SCNC: 4.4 MMOL/L (ref 3.5–5.3)
PROT SERPL-MCNC: 8.5 G/DL (ref 6.4–8.2)
PROT UR STRIP-MCNC: NEGATIVE MG/DL
RBC # BLD AUTO: 5.97 MILLION/UL (ref 3.88–5.62)
RBC #/AREA URNS AUTO: ABNORMAL /HPF
SODIUM SERPL-SCNC: 136 MMOL/L (ref 136–145)
SP GR UR STRIP.AUTO: 1.01 (ref 1–1.03)
THC UR QL: NEGATIVE
TROPONIN I SERPL-MCNC: <0.02 NG/ML
TSH SERPL DL<=0.05 MIU/L-ACNC: 1.76 UIU/ML (ref 0.36–3.74)
UROBILINOGEN UR QL STRIP.AUTO: 0.2 E.U./DL
WBC # BLD AUTO: 12.03 THOUSAND/UL (ref 4.31–10.16)
WBC #/AREA URNS AUTO: ABNORMAL /HPF

## 2019-06-11 PROCEDURE — 94760 N-INVAS EAR/PLS OXIMETRY 1: CPT

## 2019-06-11 PROCEDURE — 83880 ASSAY OF NATRIURETIC PEPTIDE: CPT | Performed by: EMERGENCY MEDICINE

## 2019-06-11 PROCEDURE — 85025 COMPLETE CBC W/AUTO DIFF WBC: CPT | Performed by: EMERGENCY MEDICINE

## 2019-06-11 PROCEDURE — 84443 ASSAY THYROID STIM HORMONE: CPT | Performed by: EMERGENCY MEDICINE

## 2019-06-11 PROCEDURE — 36415 COLL VENOUS BLD VENIPUNCTURE: CPT | Performed by: EMERGENCY MEDICINE

## 2019-06-11 PROCEDURE — 94640 AIRWAY INHALATION TREATMENT: CPT

## 2019-06-11 PROCEDURE — 93005 ELECTROCARDIOGRAM TRACING: CPT

## 2019-06-11 PROCEDURE — 80053 COMPREHEN METABOLIC PANEL: CPT | Performed by: EMERGENCY MEDICINE

## 2019-06-11 PROCEDURE — 96372 THER/PROPH/DIAG INJ SC/IM: CPT

## 2019-06-11 PROCEDURE — 80307 DRUG TEST PRSMV CHEM ANLYZR: CPT | Performed by: EMERGENCY MEDICINE

## 2019-06-11 PROCEDURE — 96361 HYDRATE IV INFUSION ADD-ON: CPT

## 2019-06-11 PROCEDURE — 99285 EMERGENCY DEPT VISIT HI MDM: CPT

## 2019-06-11 PROCEDURE — 71045 X-RAY EXAM CHEST 1 VIEW: CPT

## 2019-06-11 PROCEDURE — 94660 CPAP INITIATION&MGMT: CPT

## 2019-06-11 PROCEDURE — 81001 URINALYSIS AUTO W/SCOPE: CPT | Performed by: EMERGENCY MEDICINE

## 2019-06-11 PROCEDURE — 82948 REAGENT STRIP/BLOOD GLUCOSE: CPT

## 2019-06-11 PROCEDURE — 80320 DRUG SCREEN QUANTALCOHOLS: CPT | Performed by: EMERGENCY MEDICINE

## 2019-06-11 PROCEDURE — 84484 ASSAY OF TROPONIN QUANT: CPT | Performed by: EMERGENCY MEDICINE

## 2019-06-11 PROCEDURE — 96374 THER/PROPH/DIAG INJ IV PUSH: CPT

## 2019-06-11 RX ORDER — PREGABALIN 50 MG/1
50 CAPSULE ORAL 3 TIMES DAILY
Status: DISCONTINUED | OUTPATIENT
Start: 2019-06-11 | End: 2019-06-12 | Stop reason: HOSPADM

## 2019-06-11 RX ORDER — ALPRAZOLAM 0.5 MG/1
2 TABLET ORAL
Status: DISCONTINUED | OUTPATIENT
Start: 2019-06-11 | End: 2019-06-12 | Stop reason: HOSPADM

## 2019-06-11 RX ORDER — PRAVASTATIN SODIUM 40 MG
40 TABLET ORAL
Status: DISCONTINUED | OUTPATIENT
Start: 2019-06-12 | End: 2019-06-12 | Stop reason: HOSPADM

## 2019-06-11 RX ORDER — METFORMIN HYDROCHLORIDE 500 MG/1
500 TABLET, EXTENDED RELEASE ORAL 2 TIMES DAILY WITH MEALS
Status: DISCONTINUED | OUTPATIENT
Start: 2019-06-12 | End: 2019-06-12 | Stop reason: HOSPADM

## 2019-06-11 RX ORDER — PANTOPRAZOLE SODIUM 40 MG/1
40 TABLET, DELAYED RELEASE ORAL
Status: DISCONTINUED | OUTPATIENT
Start: 2019-06-12 | End: 2019-06-12 | Stop reason: HOSPADM

## 2019-06-11 RX ORDER — INSULIN GLARGINE 100 [IU]/ML
45 INJECTION, SOLUTION SUBCUTANEOUS
Status: DISCONTINUED | OUTPATIENT
Start: 2019-06-11 | End: 2019-06-12 | Stop reason: HOSPADM

## 2019-06-11 RX ORDER — QUETIAPINE 200 MG/1
400 TABLET, FILM COATED, EXTENDED RELEASE ORAL
Status: DISCONTINUED | OUTPATIENT
Start: 2019-06-11 | End: 2019-06-11 | Stop reason: SDUPTHER

## 2019-06-11 RX ORDER — ASPIRIN 81 MG/1
81 TABLET, CHEWABLE ORAL EVERY MORNING
Status: DISCONTINUED | OUTPATIENT
Start: 2019-06-12 | End: 2019-06-12 | Stop reason: HOSPADM

## 2019-06-11 RX ORDER — DILTIAZEM HYDROCHLORIDE 5 MG/ML
15 INJECTION INTRAVENOUS ONCE
Status: COMPLETED | OUTPATIENT
Start: 2019-06-11 | End: 2019-06-11

## 2019-06-11 RX ORDER — METOPROLOL TARTRATE 50 MG/1
100 TABLET, FILM COATED ORAL EVERY 12 HOURS SCHEDULED
Status: DISCONTINUED | OUTPATIENT
Start: 2019-06-11 | End: 2019-06-12 | Stop reason: HOSPADM

## 2019-06-11 RX ORDER — BUSPIRONE HYDROCHLORIDE 5 MG/1
15 TABLET ORAL 2 TIMES DAILY
Status: DISCONTINUED | OUTPATIENT
Start: 2019-06-11 | End: 2019-06-12 | Stop reason: HOSPADM

## 2019-06-11 RX ORDER — DILTIAZEM HYDROCHLORIDE 120 MG/1
120 CAPSULE, COATED, EXTENDED RELEASE ORAL 2 TIMES DAILY
Status: DISCONTINUED | OUTPATIENT
Start: 2019-06-11 | End: 2019-06-12 | Stop reason: HOSPADM

## 2019-06-11 RX ORDER — ERGOCALCIFEROL 1.25 MG/1
50000 CAPSULE ORAL WEEKLY
Status: DISCONTINUED | OUTPATIENT
Start: 2019-06-16 | End: 2019-06-12 | Stop reason: HOSPADM

## 2019-06-11 RX ORDER — ALBUTEROL SULFATE 90 UG/1
2 AEROSOL, METERED RESPIRATORY (INHALATION) 4 TIMES DAILY
Status: DISCONTINUED | OUTPATIENT
Start: 2019-06-11 | End: 2019-06-12 | Stop reason: HOSPADM

## 2019-06-11 RX ORDER — VENLAFAXINE HYDROCHLORIDE 150 MG/1
150 CAPSULE, EXTENDED RELEASE ORAL EVERY MORNING
Status: DISCONTINUED | OUTPATIENT
Start: 2019-06-12 | End: 2019-06-12 | Stop reason: HOSPADM

## 2019-06-11 RX ORDER — FENOFIBRATE 48 MG/1
160 TABLET, COATED ORAL EVERY MORNING
Status: DISCONTINUED | OUTPATIENT
Start: 2019-06-12 | End: 2019-06-12 | Stop reason: HOSPADM

## 2019-06-11 RX ORDER — MIRTAZAPINE 15 MG/1
45 TABLET, FILM COATED ORAL
Status: DISCONTINUED | OUTPATIENT
Start: 2019-06-11 | End: 2019-06-12 | Stop reason: HOSPADM

## 2019-06-11 RX ORDER — IPRATROPIUM BROMIDE AND ALBUTEROL SULFATE 2.5; .5 MG/3ML; MG/3ML
3 SOLUTION RESPIRATORY (INHALATION)
Status: DISCONTINUED | OUTPATIENT
Start: 2019-06-11 | End: 2019-06-12 | Stop reason: HOSPADM

## 2019-06-11 RX ORDER — FLUTICASONE FUROATE AND VILANTEROL 100; 25 UG/1; UG/1
1 POWDER RESPIRATORY (INHALATION) DAILY
Status: DISCONTINUED | OUTPATIENT
Start: 2019-06-12 | End: 2019-06-12 | Stop reason: HOSPADM

## 2019-06-11 RX ORDER — QUETIAPINE 200 MG/1
400 TABLET, FILM COATED, EXTENDED RELEASE ORAL
Status: DISCONTINUED | OUTPATIENT
Start: 2019-06-11 | End: 2019-06-12 | Stop reason: HOSPADM

## 2019-06-11 RX ADMIN — MIRTAZAPINE 45 MG: 15 TABLET, FILM COATED ORAL at 23:03

## 2019-06-11 RX ADMIN — DILTIAZEM HYDROCHLORIDE 120 MG: 120 CAPSULE, COATED, EXTENDED RELEASE ORAL at 23:02

## 2019-06-11 RX ADMIN — INSULIN LISPRO 2 UNITS: 100 INJECTION, SOLUTION INTRAVENOUS; SUBCUTANEOUS at 23:07

## 2019-06-11 RX ADMIN — SODIUM CHLORIDE 1000 ML: 0.9 INJECTION, SOLUTION INTRAVENOUS at 11:38

## 2019-06-11 RX ADMIN — ALPRAZOLAM 2 MG: 0.5 TABLET ORAL at 23:13

## 2019-06-11 RX ADMIN — ALBUTEROL SULFATE 2 PUFF: 90 AEROSOL, METERED RESPIRATORY (INHALATION) at 23:09

## 2019-06-11 RX ADMIN — INSULIN GLARGINE 45 UNITS: 100 INJECTION, SOLUTION SUBCUTANEOUS at 23:06

## 2019-06-11 RX ADMIN — METOPROLOL TARTRATE 100 MG: 50 TABLET, FILM COATED ORAL at 20:51

## 2019-06-11 RX ADMIN — DILTIAZEM HYDROCHLORIDE 15 MG: 5 INJECTION INTRAVENOUS at 13:19

## 2019-06-11 RX ADMIN — IPRATROPIUM BROMIDE AND ALBUTEROL SULFATE 3 ML: 2.5; .5 SOLUTION RESPIRATORY (INHALATION) at 21:05

## 2019-06-11 RX ADMIN — PREGABALIN 50 MG: 50 CAPSULE ORAL at 20:51

## 2019-06-11 RX ADMIN — DILTIAZEM HYDROCHLORIDE 120 MG: 30 TABLET, FILM COATED ORAL at 12:05

## 2019-06-11 RX ADMIN — APIXABAN 5 MG: 5 TABLET, FILM COATED ORAL at 20:52

## 2019-06-11 RX ADMIN — QUETIAPINE FUMARATE 400 MG: 200 TABLET, EXTENDED RELEASE ORAL at 23:03

## 2019-06-11 RX ADMIN — BUSPIRONE HYDROCHLORIDE 15 MG: 5 TABLET ORAL at 20:51

## 2019-06-12 VITALS
BODY MASS INDEX: 41.3 KG/M2 | RESPIRATION RATE: 16 BRPM | SYSTOLIC BLOOD PRESSURE: 133 MMHG | WEIGHT: 295 LBS | OXYGEN SATURATION: 95 % | TEMPERATURE: 97.6 F | HEIGHT: 71 IN | HEART RATE: 94 BPM | DIASTOLIC BLOOD PRESSURE: 74 MMHG

## 2019-06-12 LAB
GLUCOSE SERPL-MCNC: 162 MG/DL (ref 65–140)
GLUCOSE SERPL-MCNC: 270 MG/DL (ref 65–140)

## 2019-06-12 PROCEDURE — 94640 AIRWAY INHALATION TREATMENT: CPT

## 2019-06-12 PROCEDURE — 82948 REAGENT STRIP/BLOOD GLUCOSE: CPT

## 2019-06-12 PROCEDURE — 96372 THER/PROPH/DIAG INJ SC/IM: CPT

## 2019-06-12 PROCEDURE — 94760 N-INVAS EAR/PLS OXIMETRY 1: CPT

## 2019-06-12 RX ADMIN — PREGABALIN 50 MG: 50 CAPSULE ORAL at 08:29

## 2019-06-12 RX ADMIN — IPRATROPIUM BROMIDE AND ALBUTEROL SULFATE 3 ML: 2.5; .5 SOLUTION RESPIRATORY (INHALATION) at 07:15

## 2019-06-12 RX ADMIN — FLUTICASONE FUROATE AND VILANTEROL TRIFENATATE 1 PUFF: 100; 25 POWDER RESPIRATORY (INHALATION) at 08:27

## 2019-06-12 RX ADMIN — APIXABAN 5 MG: 5 TABLET, FILM COATED ORAL at 08:28

## 2019-06-12 RX ADMIN — DILTIAZEM HYDROCHLORIDE 120 MG: 120 CAPSULE, COATED, EXTENDED RELEASE ORAL at 08:28

## 2019-06-12 RX ADMIN — FENOFIBRATE 168 MG: 48 TABLET ORAL at 08:29

## 2019-06-12 RX ADMIN — VENLAFAXINE HYDROCHLORIDE 150 MG: 150 CAPSULE, EXTENDED RELEASE ORAL at 08:30

## 2019-06-12 RX ADMIN — METOPROLOL TARTRATE 100 MG: 50 TABLET, FILM COATED ORAL at 08:29

## 2019-06-12 RX ADMIN — ASPIRIN 81 MG 81 MG: 81 TABLET ORAL at 08:28

## 2019-06-12 RX ADMIN — METFORMIN HYDROCHLORIDE 500 MG: 500 TABLET, EXTENDED RELEASE ORAL at 07:31

## 2019-06-12 RX ADMIN — BUSPIRONE HYDROCHLORIDE 15 MG: 5 TABLET ORAL at 08:28

## 2019-06-12 RX ADMIN — PANTOPRAZOLE SODIUM 40 MG: 40 TABLET, DELAYED RELEASE ORAL at 07:14

## 2019-06-12 RX ADMIN — ALBUTEROL SULFATE 2 PUFF: 90 AEROSOL, METERED RESPIRATORY (INHALATION) at 07:14

## 2019-06-12 RX ADMIN — INSULIN LISPRO 1 UNITS: 100 INJECTION, SOLUTION INTRAVENOUS; SUBCUTANEOUS at 07:12

## 2019-06-14 LAB
ATRIAL RATE: 136 BPM
ATRIAL RATE: 97 BPM
QRS AXIS: 63 DEGREES
QRS AXIS: 65 DEGREES
QRSD INTERVAL: 88 MS
QRSD INTERVAL: 92 MS
QT INTERVAL: 320 MS
QT INTERVAL: 356 MS
QTC INTERVAL: 463 MS
QTC INTERVAL: 468 MS
T WAVE AXIS: 36 DEGREES
T WAVE AXIS: 44 DEGREES
VENTRICULAR RATE: 102 BPM
VENTRICULAR RATE: 129 BPM

## 2019-06-14 PROCEDURE — 93010 ELECTROCARDIOGRAM REPORT: CPT | Performed by: INTERNAL MEDICINE

## 2019-06-21 ENCOUNTER — CONSULT (OUTPATIENT)
Dept: ENDOCRINOLOGY | Facility: CLINIC | Age: 59
End: 2019-06-21
Payer: MEDICARE

## 2019-06-21 VITALS
HEIGHT: 71 IN | BODY MASS INDEX: 40.6 KG/M2 | WEIGHT: 290 LBS | HEART RATE: 63 BPM | DIASTOLIC BLOOD PRESSURE: 80 MMHG | SYSTOLIC BLOOD PRESSURE: 128 MMHG

## 2019-06-21 DIAGNOSIS — E11.8 TYPE 2 DIABETES MELLITUS WITH COMPLICATION, WITH LONG-TERM CURRENT USE OF INSULIN (HCC): ICD-10-CM

## 2019-06-21 DIAGNOSIS — Z79.4 TYPE 2 DIABETES MELLITUS WITH COMPLICATION, WITH LONG-TERM CURRENT USE OF INSULIN (HCC): ICD-10-CM

## 2019-06-21 DIAGNOSIS — I10 BENIGN ESSENTIAL HYPERTENSION: ICD-10-CM

## 2019-06-21 DIAGNOSIS — E66.01 MORBID OBESITY (HCC): ICD-10-CM

## 2019-06-21 DIAGNOSIS — E11.65 UNCONTROLLED TYPE 2 DIABETES MELLITUS WITH HYPERGLYCEMIA (HCC): Primary | ICD-10-CM

## 2019-06-21 DIAGNOSIS — E11.42 DIABETIC POLYNEUROPATHY ASSOCIATED WITH TYPE 2 DIABETES MELLITUS (HCC): ICD-10-CM

## 2019-06-21 DIAGNOSIS — N17.9 AKI (ACUTE KIDNEY INJURY) (HCC): ICD-10-CM

## 2019-06-21 PROCEDURE — 99205 OFFICE O/P NEW HI 60 MIN: CPT | Performed by: INTERNAL MEDICINE

## 2019-06-21 RX ORDER — ZOLPIDEM TARTRATE 10 MG/1
10 TABLET ORAL
COMMUNITY
End: 2019-06-28

## 2019-06-21 RX ORDER — PREGABALIN 50 MG/1
50 CAPSULE ORAL 3 TIMES DAILY
Qty: 90 CAPSULE | Refills: 3 | Status: SHIPPED | OUTPATIENT
Start: 2019-06-21 | End: 2019-11-07 | Stop reason: SDUPTHER

## 2019-06-28 ENCOUNTER — APPOINTMENT (EMERGENCY)
Dept: RADIOLOGY | Facility: HOSPITAL | Age: 59
DRG: 309 | End: 2019-06-28
Payer: MEDICARE

## 2019-06-28 ENCOUNTER — HOSPITAL ENCOUNTER (INPATIENT)
Facility: HOSPITAL | Age: 59
LOS: 2 days | Discharge: HOME HEALTH | DRG: 309 | End: 2019-07-01
Attending: EMERGENCY MEDICINE | Admitting: INTERNAL MEDICINE
Payer: MEDICARE

## 2019-06-28 DIAGNOSIS — I48.91 ATRIAL FIBRILLATION (HCC): ICD-10-CM

## 2019-06-28 DIAGNOSIS — I48.91 ATRIAL FIBRILLATION WITH RVR (HCC): ICD-10-CM

## 2019-06-28 DIAGNOSIS — R07.9 CHEST PAIN: Primary | ICD-10-CM

## 2019-06-28 DIAGNOSIS — F31.70 BIPOLAR AFFECTIVE DISORDER IN REMISSION (HCC): ICD-10-CM

## 2019-06-28 DIAGNOSIS — Z79.4 TYPE 2 DIABETES MELLITUS WITH COMPLICATION, WITH LONG-TERM CURRENT USE OF INSULIN (HCC): ICD-10-CM

## 2019-06-28 DIAGNOSIS — E11.8 TYPE 2 DIABETES MELLITUS WITH COMPLICATION, WITH LONG-TERM CURRENT USE OF INSULIN (HCC): ICD-10-CM

## 2019-06-28 PROBLEM — J96.10 CHRONIC RESPIRATORY FAILURE (HCC): Status: RESOLVED | Noted: 2018-10-31 | Resolved: 2019-06-28

## 2019-06-28 PROBLEM — J96.20 ACUTE ON CHRONIC RESPIRATORY FAILURE (HCC): Status: ACTIVE | Noted: 2018-10-31

## 2019-06-28 PROBLEM — J96.10 CHRONIC RESPIRATORY FAILURE (HCC): Status: ACTIVE | Noted: 2018-10-31

## 2019-06-28 LAB
ALBUMIN SERPL BCP-MCNC: 4.4 G/DL (ref 3.5–5)
ALP SERPL-CCNC: 51 U/L (ref 46–116)
ALT SERPL W P-5'-P-CCNC: 40 U/L (ref 12–78)
ANION GAP SERPL CALCULATED.3IONS-SCNC: 14 MMOL/L (ref 4–13)
AST SERPL W P-5'-P-CCNC: 25 U/L (ref 5–45)
BASOPHILS # BLD AUTO: 0.05 THOUSANDS/ΜL (ref 0–0.1)
BASOPHILS NFR BLD AUTO: 1 % (ref 0–1)
BILIRUB SERPL-MCNC: 0.9 MG/DL (ref 0.2–1)
BUN SERPL-MCNC: 15 MG/DL (ref 5–25)
CALCIUM SERPL-MCNC: 9.4 MG/DL (ref 8.3–10.1)
CHLORIDE SERPL-SCNC: 101 MMOL/L (ref 100–108)
CO2 SERPL-SCNC: 24 MMOL/L (ref 21–32)
CREAT SERPL-MCNC: 1.32 MG/DL (ref 0.6–1.3)
EOSINOPHIL # BLD AUTO: 0.06 THOUSAND/ΜL (ref 0–0.61)
EOSINOPHIL NFR BLD AUTO: 1 % (ref 0–6)
ERYTHROCYTE [DISTWIDTH] IN BLOOD BY AUTOMATED COUNT: 13 % (ref 11.6–15.1)
GFR SERPL CREATININE-BSD FRML MDRD: 59 ML/MIN/1.73SQ M
GLUCOSE SERPL-MCNC: 201 MG/DL (ref 65–140)
GLUCOSE SERPL-MCNC: 202 MG/DL (ref 65–140)
HCT VFR BLD AUTO: 50.5 % (ref 36.5–49.3)
HGB BLD-MCNC: 16.8 G/DL (ref 12–17)
IMM GRANULOCYTES # BLD AUTO: 0.05 THOUSAND/UL (ref 0–0.2)
IMM GRANULOCYTES NFR BLD AUTO: 1 % (ref 0–2)
LIPASE SERPL-CCNC: 86 U/L (ref 73–393)
LYMPHOCYTES # BLD AUTO: 3.67 THOUSANDS/ΜL (ref 0.6–4.47)
LYMPHOCYTES NFR BLD AUTO: 36 % (ref 14–44)
MAGNESIUM SERPL-MCNC: 2.1 MG/DL (ref 1.6–2.6)
MCH RBC QN AUTO: 31.2 PG (ref 26.8–34.3)
MCHC RBC AUTO-ENTMCNC: 33.3 G/DL (ref 31.4–37.4)
MCV RBC AUTO: 94 FL (ref 82–98)
MONOCYTES # BLD AUTO: 0.54 THOUSAND/ΜL (ref 0.17–1.22)
MONOCYTES NFR BLD AUTO: 5 % (ref 4–12)
NEUTROPHILS # BLD AUTO: 5.85 THOUSANDS/ΜL (ref 1.85–7.62)
NEUTS SEG NFR BLD AUTO: 56 % (ref 43–75)
NRBC BLD AUTO-RTO: 0 /100 WBCS
NT-PROBNP SERPL-MCNC: 370 PG/ML
PLATELET # BLD AUTO: 185 THOUSANDS/UL (ref 149–390)
PMV BLD AUTO: 10.4 FL (ref 8.9–12.7)
POTASSIUM SERPL-SCNC: 4.3 MMOL/L (ref 3.5–5.3)
PROT SERPL-MCNC: 7.4 G/DL (ref 6.4–8.2)
RBC # BLD AUTO: 5.39 MILLION/UL (ref 3.88–5.62)
SODIUM SERPL-SCNC: 139 MMOL/L (ref 136–145)
TROPONIN I SERPL-MCNC: 0.02 NG/ML
TROPONIN I SERPL-MCNC: <0.02 NG/ML
TROPONIN I SERPL-MCNC: <0.02 NG/ML
WBC # BLD AUTO: 10.22 THOUSAND/UL (ref 4.31–10.16)

## 2019-06-28 PROCEDURE — 71045 X-RAY EXAM CHEST 1 VIEW: CPT

## 2019-06-28 PROCEDURE — 83735 ASSAY OF MAGNESIUM: CPT | Performed by: EMERGENCY MEDICINE

## 2019-06-28 PROCEDURE — 96374 THER/PROPH/DIAG INJ IV PUSH: CPT

## 2019-06-28 PROCEDURE — 80053 COMPREHEN METABOLIC PANEL: CPT | Performed by: EMERGENCY MEDICINE

## 2019-06-28 PROCEDURE — 96361 HYDRATE IV INFUSION ADD-ON: CPT

## 2019-06-28 PROCEDURE — 94760 N-INVAS EAR/PLS OXIMETRY 1: CPT

## 2019-06-28 PROCEDURE — 36415 COLL VENOUS BLD VENIPUNCTURE: CPT | Performed by: EMERGENCY MEDICINE

## 2019-06-28 PROCEDURE — 85025 COMPLETE CBC W/AUTO DIFF WBC: CPT | Performed by: EMERGENCY MEDICINE

## 2019-06-28 PROCEDURE — 84484 ASSAY OF TROPONIN QUANT: CPT | Performed by: NURSE PRACTITIONER

## 2019-06-28 PROCEDURE — 82948 REAGENT STRIP/BLOOD GLUCOSE: CPT

## 2019-06-28 PROCEDURE — 87081 CULTURE SCREEN ONLY: CPT | Performed by: INTERNAL MEDICINE

## 2019-06-28 PROCEDURE — 99220 PR INITIAL OBSERVATION CARE/DAY 70 MINUTES: CPT | Performed by: NURSE PRACTITIONER

## 2019-06-28 PROCEDURE — 94660 CPAP INITIATION&MGMT: CPT

## 2019-06-28 PROCEDURE — 83690 ASSAY OF LIPASE: CPT | Performed by: EMERGENCY MEDICINE

## 2019-06-28 PROCEDURE — 94664 DEMO&/EVAL PT USE INHALER: CPT

## 2019-06-28 PROCEDURE — 94640 AIRWAY INHALATION TREATMENT: CPT

## 2019-06-28 PROCEDURE — 96375 TX/PRO/DX INJ NEW DRUG ADDON: CPT

## 2019-06-28 PROCEDURE — 99285 EMERGENCY DEPT VISIT HI MDM: CPT

## 2019-06-28 PROCEDURE — 84484 ASSAY OF TROPONIN QUANT: CPT | Performed by: EMERGENCY MEDICINE

## 2019-06-28 PROCEDURE — 93005 ELECTROCARDIOGRAM TRACING: CPT

## 2019-06-28 PROCEDURE — 83880 ASSAY OF NATRIURETIC PEPTIDE: CPT | Performed by: EMERGENCY MEDICINE

## 2019-06-28 RX ORDER — NITROGLYCERIN 0.4 MG/1
0.4 TABLET SUBLINGUAL ONCE
Status: COMPLETED | OUTPATIENT
Start: 2019-06-28 | End: 2019-06-28

## 2019-06-28 RX ORDER — METFORMIN HYDROCHLORIDE 500 MG/1
500 TABLET, EXTENDED RELEASE ORAL 2 TIMES DAILY WITH MEALS
Status: DISCONTINUED | OUTPATIENT
Start: 2019-06-29 | End: 2019-07-01 | Stop reason: HOSPADM

## 2019-06-28 RX ORDER — ZOLPIDEM TARTRATE 10 MG/1
10 TABLET ORAL
COMMUNITY
End: 2019-07-12

## 2019-06-28 RX ORDER — PRAVASTATIN SODIUM 40 MG
40 TABLET ORAL
Status: DISCONTINUED | OUTPATIENT
Start: 2019-06-29 | End: 2019-07-01 | Stop reason: HOSPADM

## 2019-06-28 RX ORDER — INSULIN GLARGINE 100 [IU]/ML
45 INJECTION, SOLUTION SUBCUTANEOUS
Status: DISCONTINUED | OUTPATIENT
Start: 2019-06-28 | End: 2019-07-01 | Stop reason: HOSPADM

## 2019-06-28 RX ORDER — METOPROLOL TARTRATE 5 MG/5ML
5 INJECTION INTRAVENOUS ONCE
Status: COMPLETED | OUTPATIENT
Start: 2019-06-28 | End: 2019-06-28

## 2019-06-28 RX ORDER — PANTOPRAZOLE SODIUM 20 MG/1
20 TABLET, DELAYED RELEASE ORAL
Status: DISCONTINUED | OUTPATIENT
Start: 2019-06-29 | End: 2019-07-01 | Stop reason: HOSPADM

## 2019-06-28 RX ORDER — PREGABALIN 50 MG/1
50 CAPSULE ORAL 3 TIMES DAILY
Status: DISCONTINUED | OUTPATIENT
Start: 2019-06-28 | End: 2019-07-01 | Stop reason: HOSPADM

## 2019-06-28 RX ORDER — METOPROLOL TARTRATE 50 MG/1
100 TABLET, FILM COATED ORAL ONCE
Status: COMPLETED | OUTPATIENT
Start: 2019-06-28 | End: 2019-06-28

## 2019-06-28 RX ORDER — DILTIAZEM HYDROCHLORIDE 5 MG/ML
15 INJECTION INTRAVENOUS ONCE
Status: DISCONTINUED | OUTPATIENT
Start: 2019-06-28 | End: 2019-06-28

## 2019-06-28 RX ORDER — INSULIN GLARGINE 100 [IU]/ML
50 INJECTION, SOLUTION SUBCUTANEOUS
Status: DISCONTINUED | OUTPATIENT
Start: 2019-06-28 | End: 2019-06-28

## 2019-06-28 RX ORDER — IPRATROPIUM BROMIDE AND ALBUTEROL SULFATE 2.5; .5 MG/3ML; MG/3ML
3 SOLUTION RESPIRATORY (INHALATION)
Status: DISCONTINUED | OUTPATIENT
Start: 2019-06-28 | End: 2019-07-01 | Stop reason: HOSPADM

## 2019-06-28 RX ORDER — FLUTICASONE FUROATE AND VILANTEROL 100; 25 UG/1; UG/1
1 POWDER RESPIRATORY (INHALATION) DAILY
Status: DISCONTINUED | OUTPATIENT
Start: 2019-06-29 | End: 2019-07-01 | Stop reason: HOSPADM

## 2019-06-28 RX ORDER — ZOLPIDEM TARTRATE 5 MG/1
10 TABLET ORAL
Status: DISCONTINUED | OUTPATIENT
Start: 2019-06-28 | End: 2019-07-01 | Stop reason: HOSPADM

## 2019-06-28 RX ORDER — FENOFIBRATE 145 MG/1
145 TABLET, COATED ORAL DAILY
Status: DISCONTINUED | OUTPATIENT
Start: 2019-06-29 | End: 2019-07-01 | Stop reason: HOSPADM

## 2019-06-28 RX ORDER — METOPROLOL TARTRATE 100 MG/1
100 TABLET ORAL EVERY 12 HOURS SCHEDULED
Status: DISCONTINUED | OUTPATIENT
Start: 2019-06-29 | End: 2019-06-30

## 2019-06-28 RX ORDER — ONDANSETRON 2 MG/ML
4 INJECTION INTRAMUSCULAR; INTRAVENOUS EVERY 6 HOURS PRN
Status: DISCONTINUED | OUTPATIENT
Start: 2019-06-28 | End: 2019-07-01 | Stop reason: HOSPADM

## 2019-06-28 RX ORDER — DILTIAZEM HYDROCHLORIDE 180 MG/1
360 CAPSULE, COATED, EXTENDED RELEASE ORAL DAILY
Status: DISCONTINUED | OUTPATIENT
Start: 2019-06-28 | End: 2019-06-28

## 2019-06-28 RX ORDER — FENOFIBRATE 48 MG/1
160 TABLET, COATED ORAL EVERY MORNING
Status: DISCONTINUED | OUTPATIENT
Start: 2019-06-29 | End: 2019-06-28 | Stop reason: SDUPTHER

## 2019-06-28 RX ORDER — DILTIAZEM HYDROCHLORIDE 5 MG/ML
15 INJECTION INTRAVENOUS ONCE
Status: COMPLETED | OUTPATIENT
Start: 2019-06-28 | End: 2019-06-28

## 2019-06-28 RX ORDER — LOSARTAN POTASSIUM 50 MG/1
50 TABLET ORAL 2 TIMES DAILY
COMMUNITY
End: 2020-06-17 | Stop reason: SDUPTHER

## 2019-06-28 RX ORDER — QUETIAPINE 200 MG/1
200 TABLET, FILM COATED, EXTENDED RELEASE ORAL EVERY MORNING
Status: DISCONTINUED | OUTPATIENT
Start: 2019-06-29 | End: 2019-07-01 | Stop reason: HOSPADM

## 2019-06-28 RX ORDER — QUETIAPINE 200 MG/1
400 TABLET, FILM COATED, EXTENDED RELEASE ORAL
Status: DISCONTINUED | OUTPATIENT
Start: 2019-06-28 | End: 2019-07-01 | Stop reason: HOSPADM

## 2019-06-28 RX ORDER — ASPIRIN 81 MG/1
81 TABLET, CHEWABLE ORAL EVERY MORNING
Status: DISCONTINUED | OUTPATIENT
Start: 2019-06-29 | End: 2019-07-01 | Stop reason: HOSPADM

## 2019-06-28 RX ORDER — LOSARTAN POTASSIUM 50 MG/1
50 TABLET ORAL 2 TIMES DAILY
Status: DISCONTINUED | OUTPATIENT
Start: 2019-06-28 | End: 2019-07-01 | Stop reason: HOSPADM

## 2019-06-28 RX ADMIN — ZOLPIDEM TARTRATE 10 MG: 5 TABLET, COATED ORAL at 22:29

## 2019-06-28 RX ADMIN — MORPHINE SULFATE 2 MG: 2 INJECTION, SOLUTION INTRAMUSCULAR; INTRAVENOUS at 20:32

## 2019-06-28 RX ADMIN — NITROGLYCERIN 0.4 MG: 0.4 TABLET SUBLINGUAL at 16:50

## 2019-06-28 RX ADMIN — INSULIN GLARGINE 45 UNITS: 100 INJECTION, SOLUTION SUBCUTANEOUS at 22:28

## 2019-06-28 RX ADMIN — PREGABALIN 50 MG: 50 CAPSULE ORAL at 22:28

## 2019-06-28 RX ADMIN — APIXABAN 5 MG: 5 TABLET, FILM COATED ORAL at 22:27

## 2019-06-28 RX ADMIN — LOSARTAN POTASSIUM 50 MG: 50 TABLET, FILM COATED ORAL at 22:29

## 2019-06-28 RX ADMIN — BUSPIRONE HYDROCHLORIDE 15 MG: 5 TABLET ORAL at 22:27

## 2019-06-28 RX ADMIN — DILTIAZEM HYDROCHLORIDE 15 MG: 5 INJECTION INTRAVENOUS at 14:21

## 2019-06-28 RX ADMIN — SODIUM CHLORIDE 500 ML: 0.9 INJECTION, SOLUTION INTRAVENOUS at 13:55

## 2019-06-28 RX ADMIN — IPRATROPIUM BROMIDE AND ALBUTEROL SULFATE 3 ML: 2.5; .5 SOLUTION RESPIRATORY (INHALATION) at 21:55

## 2019-06-28 RX ADMIN — METOPROLOL TARTRATE 5 MG: 5 INJECTION, SOLUTION INTRAVENOUS at 13:55

## 2019-06-28 RX ADMIN — INSULIN LISPRO 4 UNITS: 100 INJECTION, SOLUTION INTRAVENOUS; SUBCUTANEOUS at 22:30

## 2019-06-28 RX ADMIN — QUETIAPINE FUMARATE 400 MG: 200 TABLET, EXTENDED RELEASE ORAL at 22:28

## 2019-06-28 RX ADMIN — METOPROLOL TARTRATE 100 MG: 50 TABLET, FILM COATED ORAL at 16:38

## 2019-06-28 NOTE — ASSESSMENT & PLAN NOTE
Patient presented to the ED with complaints of fatigue, shortness of breath, chest pain and palpitations  Patient was in his usually state of health today but after exerting himself felt excessively short of breath  In the ER, patient was found to be in afib with RVR  He reports reproducible tenderness under his left chest which he describes as a pressure  Patient was given cardizem with an improvement in his HR       Admit to telemetry   Rate control with lopressor; patient states his cardizem was stopped by Dr Myranda Sutton last week - patient states he was started on losartan instead - unclear why, will need records   Continue with eliquis for anticoagulation   Patient reports having a recent normal stress test and an echo which revealed an EF of 45% - will need to obtain records from Dr Junior Alamo Cardiology consultation
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
## 2019-06-29 LAB
ANION GAP SERPL CALCULATED.3IONS-SCNC: 8 MMOL/L (ref 4–13)
ATRIAL RATE: 138 BPM
BASOPHILS # BLD AUTO: 0.04 THOUSANDS/ΜL (ref 0–0.1)
BASOPHILS NFR BLD AUTO: 1 % (ref 0–1)
BUN SERPL-MCNC: 21 MG/DL (ref 5–25)
CALCIUM SERPL-MCNC: 8.7 MG/DL (ref 8.3–10.1)
CHLORIDE SERPL-SCNC: 104 MMOL/L (ref 100–108)
CO2 SERPL-SCNC: 26 MMOL/L (ref 21–32)
CREAT SERPL-MCNC: 1.09 MG/DL (ref 0.6–1.3)
EOSINOPHIL # BLD AUTO: 0.12 THOUSAND/ΜL (ref 0–0.61)
EOSINOPHIL NFR BLD AUTO: 2 % (ref 0–6)
ERYTHROCYTE [DISTWIDTH] IN BLOOD BY AUTOMATED COUNT: 12.9 % (ref 11.6–15.1)
GFR SERPL CREATININE-BSD FRML MDRD: 74 ML/MIN/1.73SQ M
GLUCOSE SERPL-MCNC: 101 MG/DL (ref 65–140)
GLUCOSE SERPL-MCNC: 118 MG/DL (ref 65–140)
GLUCOSE SERPL-MCNC: 171 MG/DL (ref 65–140)
GLUCOSE SERPL-MCNC: 181 MG/DL (ref 65–140)
GLUCOSE SERPL-MCNC: 231 MG/DL (ref 65–140)
GLUCOSE SERPL-MCNC: 272 MG/DL (ref 65–140)
HCT VFR BLD AUTO: 44.9 % (ref 36.5–49.3)
HGB BLD-MCNC: 15.1 G/DL (ref 12–17)
IMM GRANULOCYTES # BLD AUTO: 0.04 THOUSAND/UL (ref 0–0.2)
IMM GRANULOCYTES NFR BLD AUTO: 1 % (ref 0–2)
LYMPHOCYTES # BLD AUTO: 3.67 THOUSANDS/ΜL (ref 0.6–4.47)
LYMPHOCYTES NFR BLD AUTO: 49 % (ref 14–44)
MCH RBC QN AUTO: 31.5 PG (ref 26.8–34.3)
MCHC RBC AUTO-ENTMCNC: 33.6 G/DL (ref 31.4–37.4)
MCV RBC AUTO: 94 FL (ref 82–98)
MONOCYTES # BLD AUTO: 0.54 THOUSAND/ΜL (ref 0.17–1.22)
MONOCYTES NFR BLD AUTO: 8 % (ref 4–12)
NEUTROPHILS # BLD AUTO: 2.81 THOUSANDS/ΜL (ref 1.85–7.62)
NEUTS SEG NFR BLD AUTO: 39 % (ref 43–75)
NRBC BLD AUTO-RTO: 0 /100 WBCS
PLATELET # BLD AUTO: 143 THOUSANDS/UL (ref 149–390)
PMV BLD AUTO: 10.1 FL (ref 8.9–12.7)
POTASSIUM SERPL-SCNC: 4 MMOL/L (ref 3.5–5.3)
QRS AXIS: 71 DEGREES
QRSD INTERVAL: 88 MS
QT INTERVAL: 314 MS
QTC INTERVAL: 475 MS
RBC # BLD AUTO: 4.79 MILLION/UL (ref 3.88–5.62)
SODIUM SERPL-SCNC: 138 MMOL/L (ref 136–145)
T WAVE AXIS: 12 DEGREES
VENTRICULAR RATE: 138 BPM
WBC # BLD AUTO: 7.22 THOUSAND/UL (ref 4.31–10.16)

## 2019-06-29 PROCEDURE — 80048 BASIC METABOLIC PNL TOTAL CA: CPT | Performed by: NURSE PRACTITIONER

## 2019-06-29 PROCEDURE — 94660 CPAP INITIATION&MGMT: CPT

## 2019-06-29 PROCEDURE — G8979 MOBILITY GOAL STATUS: HCPCS

## 2019-06-29 PROCEDURE — 82948 REAGENT STRIP/BLOOD GLUCOSE: CPT

## 2019-06-29 PROCEDURE — G8987 SELF CARE CURRENT STATUS: HCPCS

## 2019-06-29 PROCEDURE — 94760 N-INVAS EAR/PLS OXIMETRY 1: CPT

## 2019-06-29 PROCEDURE — 94640 AIRWAY INHALATION TREATMENT: CPT

## 2019-06-29 PROCEDURE — 85025 COMPLETE CBC W/AUTO DIFF WBC: CPT | Performed by: NURSE PRACTITIONER

## 2019-06-29 PROCEDURE — 93010 ELECTROCARDIOGRAM REPORT: CPT | Performed by: INTERNAL MEDICINE

## 2019-06-29 PROCEDURE — 99223 1ST HOSP IP/OBS HIGH 75: CPT | Performed by: INTERNAL MEDICINE

## 2019-06-29 PROCEDURE — 99232 SBSQ HOSP IP/OBS MODERATE 35: CPT | Performed by: INTERNAL MEDICINE

## 2019-06-29 PROCEDURE — G8978 MOBILITY CURRENT STATUS: HCPCS

## 2019-06-29 PROCEDURE — 97167 OT EVAL HIGH COMPLEX 60 MIN: CPT

## 2019-06-29 PROCEDURE — G8988 SELF CARE GOAL STATUS: HCPCS

## 2019-06-29 PROCEDURE — 97163 PT EVAL HIGH COMPLEX 45 MIN: CPT

## 2019-06-29 RX ORDER — METOPROLOL TARTRATE 50 MG/1
50 TABLET, FILM COATED ORAL
Status: DISCONTINUED | OUTPATIENT
Start: 2019-06-29 | End: 2019-06-30

## 2019-06-29 RX ADMIN — LOSARTAN POTASSIUM 50 MG: 50 TABLET, FILM COATED ORAL at 09:00

## 2019-06-29 RX ADMIN — LOSARTAN POTASSIUM 50 MG: 50 TABLET, FILM COATED ORAL at 17:23

## 2019-06-29 RX ADMIN — PANTOPRAZOLE SODIUM 20 MG: 20 TABLET, DELAYED RELEASE ORAL at 06:45

## 2019-06-29 RX ADMIN — PREGABALIN 50 MG: 50 CAPSULE ORAL at 15:33

## 2019-06-29 RX ADMIN — FENOFIBRATE 145 MG: 145 TABLET, COATED ORAL at 09:01

## 2019-06-29 RX ADMIN — INSULIN LISPRO 18 UNITS: 100 INJECTION, SOLUTION INTRAVENOUS; SUBCUTANEOUS at 11:34

## 2019-06-29 RX ADMIN — METOPROLOL TARTRATE 50 MG: 50 TABLET, FILM COATED ORAL at 17:22

## 2019-06-29 RX ADMIN — INSULIN LISPRO 20 UNITS: 100 INJECTION, SOLUTION INTRAVENOUS; SUBCUTANEOUS at 08:59

## 2019-06-29 RX ADMIN — FLUTICASONE FUROATE AND VILANTEROL TRIFENATATE 1 PUFF: 100; 25 POWDER RESPIRATORY (INHALATION) at 09:00

## 2019-06-29 RX ADMIN — INSULIN LISPRO 28 UNITS: 100 INJECTION, SOLUTION INTRAVENOUS; SUBCUTANEOUS at 17:23

## 2019-06-29 RX ADMIN — QUETIAPINE FUMARATE 200 MG: 200 TABLET, EXTENDED RELEASE ORAL at 09:02

## 2019-06-29 RX ADMIN — APIXABAN 5 MG: 5 TABLET, FILM COATED ORAL at 09:00

## 2019-06-29 RX ADMIN — BUSPIRONE HYDROCHLORIDE 15 MG: 5 TABLET ORAL at 17:23

## 2019-06-29 RX ADMIN — IPRATROPIUM BROMIDE AND ALBUTEROL SULFATE 3 ML: 2.5; .5 SOLUTION RESPIRATORY (INHALATION) at 03:00

## 2019-06-29 RX ADMIN — IPRATROPIUM BROMIDE AND ALBUTEROL SULFATE 3 ML: 2.5; .5 SOLUTION RESPIRATORY (INHALATION) at 14:43

## 2019-06-29 RX ADMIN — PRAVASTATIN SODIUM 40 MG: 40 TABLET ORAL at 15:35

## 2019-06-29 RX ADMIN — IPRATROPIUM BROMIDE AND ALBUTEROL SULFATE 3 ML: 2.5; .5 SOLUTION RESPIRATORY (INHALATION) at 19:21

## 2019-06-29 RX ADMIN — METOPROLOL TARTRATE 100 MG: 100 TABLET, FILM COATED ORAL at 09:00

## 2019-06-29 RX ADMIN — PREGABALIN 50 MG: 50 CAPSULE ORAL at 09:00

## 2019-06-29 RX ADMIN — METFORMIN HYDROCHLORIDE 500 MG: 500 TABLET, EXTENDED RELEASE ORAL at 15:33

## 2019-06-29 RX ADMIN — METFORMIN HYDROCHLORIDE 500 MG: 500 TABLET, EXTENDED RELEASE ORAL at 09:02

## 2019-06-29 RX ADMIN — MORPHINE SULFATE 2 MG: 2 INJECTION, SOLUTION INTRAMUSCULAR; INTRAVENOUS at 09:31

## 2019-06-29 RX ADMIN — ZOLPIDEM TARTRATE 10 MG: 5 TABLET, COATED ORAL at 21:22

## 2019-06-29 RX ADMIN — INSULIN GLARGINE 45 UNITS: 100 INJECTION, SOLUTION SUBCUTANEOUS at 21:23

## 2019-06-29 RX ADMIN — ASPIRIN 81 MG 81 MG: 81 TABLET ORAL at 09:02

## 2019-06-29 RX ADMIN — BUSPIRONE HYDROCHLORIDE 15 MG: 5 TABLET ORAL at 09:00

## 2019-06-29 RX ADMIN — INSULIN LISPRO 4 UNITS: 100 INJECTION, SOLUTION INTRAVENOUS; SUBCUTANEOUS at 11:34

## 2019-06-29 RX ADMIN — IPRATROPIUM BROMIDE AND ALBUTEROL SULFATE 3 ML: 2.5; .5 SOLUTION RESPIRATORY (INHALATION) at 07:28

## 2019-06-29 RX ADMIN — APIXABAN 5 MG: 5 TABLET, FILM COATED ORAL at 17:23

## 2019-06-29 RX ADMIN — INSULIN LISPRO 2 UNITS: 100 INJECTION, SOLUTION INTRAVENOUS; SUBCUTANEOUS at 08:59

## 2019-06-29 RX ADMIN — PREGABALIN 50 MG: 50 CAPSULE ORAL at 21:13

## 2019-06-29 RX ADMIN — QUETIAPINE FUMARATE 400 MG: 200 TABLET, EXTENDED RELEASE ORAL at 21:12

## 2019-06-29 RX ADMIN — METOPROLOL TARTRATE 100 MG: 100 TABLET, FILM COATED ORAL at 21:13

## 2019-06-29 NOTE — PROGRESS NOTES
Progress Note Jack Posadas 1960, 60 y o  male MRN: [id number]    Unit/Bed#: [address] [address] Encounter: [id number]    Primary Care Provider: Lazaro Childers MD   Date and time admitted to hospital: 6/28/2019  1:11 PM        * Atrial fibrillation with RVR [facility])  Assessment & Plan    Patient patient presented with exertional shortness of breath, fatigue, chest pain and palpitations   Patient's heart rate was uncontrolled in the ED   Patient was recently taken off Cardizem due to cardiomyopathy by his primary cardiologist   Medina Villar Patient's metoprolol will be increased to 100 milligram b i d  And 50 milligrams in the afternoon    SHAVONNE and COPD overlap syndrome (HCC)  Assessment & Plan  Continue bipap q h s  No evidence of COPD exacerbation  Continue Breo Ellipta and nebulizers    Type 2 diabetes mellitus with complication, with long-term current use of insulin (HCC)  Assessment & Plan  Patient Hga1c 8 3 on 4/19  Continue home medications with dapagliflozin, lantus, metformin, victoza  Continue home novolog coverage and add sliding scale    Benign essential hypertension  Assessment & Plan  Continue losartan and metoprolol    CAD (coronary artery disease)  Assessment & Plan  Chest pain atypical -serial troponins were negative  Continue aspirin, statin, metoprolol  Patient recently had a nuclear stress test which was unremarkable about a month ago by his primary cardiologist     Bipolar affective disorder ([facility] [address] )  Assessment & Plan  Continue buspar, seroquel, ambien    Chronic respiratory failure with hypoxia (HCC)  Assessment & Plan  Pulse ox stable  Continue 2 liters of oxygen continuously which he uses at home  Cpap at night        VTE Pharmacologic Prophylaxis:   Pharmacologic: Apixaban (Eliquis)  Mechanical VTE Prophylaxis in Place: Yes    Patient Centered Rounds: I have performed bedside rounds with nursing staff today    Discussions with Specialists or Other Care Team Provider: Yes  Education and Discussions with Family / Patient:Yes  Time Spent for Care: 45 minutes  More than 50% of total time spent on counseling and coordination of care as described above  Current Length of Stay: 0 day(s)  Current Patient Status: Inpatient     Discharge Plan: Home    Code Status: Level 1 - Full Code      Subjective:   Patient's chest pain has improved  Patient has occasional wheezing and shortness of breath on exertion  Objective:     Vitals:   Temp (24hrs), Av 9 °F (36 6 °C), Min:97 4 °F (36 3 °C), Max:98 5 °F (36 9 °C)    Temp:  [97 4 °F (36 3 °C)-98 5 °F (36 9 °C)] 98 3 °F (36 8 °C)  HR:  [] 90  Resp:  [17-27] 18  BP: (118-168)/() 148/91  SpO2:  [94 %-98 %] 95 %  Body mass index is 39 33 kg/m²  Input and Output Summary (last 24 hours): Intake/Output Summary (Last 24 hours) at 2019 1708  Last data filed at 2019 0931  Gross per 24 hour   Intake 700 ml   Output 500 ml   Net 200 ml        Physical Exam:     Physical Exam   Constitutional: No distress  HENT:   Head: Normocephalic and atraumatic  Eyes: Pupils are equal, round, and reactive to light  Conjunctivae are normal    Neck: Normal range of motion  Neck supple  Cardiovascular: Normal heart sounds  Irregularly irregular   Pulmonary/Chest: Effort normal  No respiratory distress  He has no wheezes  He has no rhonchi  He has no rales  He exhibits no tenderness  Abdominal: Soft  Bowel sounds are normal  He exhibits no distension  There is no tenderness  There is no rebound and no guarding  Musculoskeletal: He exhibits no edema  Neurological: He is alert  No cranial nerve deficit  Skin: Skin is warm and dry  No rash noted         Additional Data:     Labs:    Results from last 7 days   Lab Units 19  0635 19  1344   WBC Thousand/uL 7 22 10 22*   HEMOGLOBIN g/dL 15 1 16 8   HEMATOCRIT % 44 9 50 5*   PLATELETS Thousands/uL 143* 185   NEUTROS PCT % 39* 56     Results from last 7 days   Lab Units 06/29/19  0445 06/28/19  1344   SODIUM mmol/L 138 139   POTASSIUM mmol/L 4 0 4 3   CHLORIDE mmol/L 104 101   CO2 mmol/L 26 24   BUN mg/dL 21 15   CREATININE mg/dL 1 09 1 32*   CALCIUM mg/dL 8 7 9 4   TOTAL BILIRUBIN mg/dL  --  0 90   ALK PHOS U/L  --  51   ALT U/L  --  40   AST U/L  --  25         Results from last 7 days   Lab Units 06/28/19  2043 06/28/19  1632 06/28/19  1344   TROPONIN I ng/mL 0 02 <0 02 <0 02     Lab Results   Component Value Date/Time    HGBA1C 8 3 (H) 04/19/2019 10:19 AM    HGBA1C 8 0 07/21/2017 09:27 AM     Results from last 7 days   Lab Units 06/29/19  1604 06/29/19  1119 06/29/19  0712 06/28/19  2111 06/28/19  1316   POC GLUCOSE mg/dl 118 231* 171* 272* 201*           * I Have Reviewed All Lab Data Listed Above  * Additional Pertinent Lab Tests Reviewed: [facility] Reviewed    Imaging:     XR chest 1 view portable   Final Result by Odette Holguin MD (06/28 1446)   Stable exam    No acute cardiopulmonary disease  Workstation performed: [id number]           Imaging Reports Reviewed by myself    Cultures:   Blood Culture:   Lab Results   Component Value Date    BLOODCX No Growth After 5 Days  11/16/2018    BLOODCX Staphylococcus coagulase negative (A) 11/16/2018    BLOODCX No Growth After 5 Days  10/31/2018    BLOODCX No Growth After 5 Days  10/31/2018    BLOODCX No Growth After 5 Days  09/06/2017    BLOODCX No Growth After 5 Days   09/06/2017     Urine Culture:   Lab Results   Component Value Date    URINECX 8179-2626 cfu/ml Mixed Contaminants X2 03/20/2017    URINECX No Growth <1000 cfu/mL 11/27/2016    URINECX No Growth <1000 cfu/mL 09/02/2016     Sputum Culture: No components found for: SPUTUMCX  Wound Culture: No results found for: WOUNDCULT    Last 24 Hours Medication List:     Current Facility-Administered Medications:  apixaban 5 mg Oral BID MANAS Mahoney   aspirin 81 mg Oral QAM MANAS Mahoney   busPIRone 15 mg Oral BID Cortez Engel Glory Starr, CRNP   Dapagliflozin Propanediol 10 mg Oral QAM Iesha Fair, CRNP   fenofibrate 145 mg Oral Daily Iesha Fair, CRNP   fluticasone-vilanterol 1 puff Inhalation Daily Iesha Fair, CRNP   insulin glargine 45 Units Subcutaneous HS Iesha Fair, CRNP   insulin lispro 1-6 Units Subcutaneous HS Iesha Fair, CRNP   insulin lispro 18 Units Subcutaneous Daily Before Lunch Iesha Fair, CRNP   insulin lispro 2-12 Units Subcutaneous TID AC Iesha Fair, CRNP   insulin lispro 20 Units Subcutaneous Daily Before Breakfast Iesha Fair, CRNP   insulin lispro 28 Units Subcutaneous Daily With Dinner Iesha Fair, CRNP   ipratropium-albuterol 3 mL Nebulization Q6H Iesha Fair, CRNP   liraglutide 1 8 mg Subcutaneous Daily Iesha Fair, CRNP   losartan 50 mg Oral BID Iesha Fair, CRNP   metFORMIN 500 mg Oral BID With Meals Iesha Fair, CRNP   metoprolol tartrate 100 mg Oral Q12H [address] Iesha Fair, CRNP   metoprolol tartrate 50 mg Oral After Lunch Lucy Claderón MD   morphine injection 2 mg Intravenous Q6H PRN Deborah Hooker MD   ondansetron 4 mg Intravenous Q6H PRN Iesha Fair, CRNP   pantoprazole 20 mg Oral Early Morning Iesha Fair, CRNP   pravastatin 40 mg Oral Daily With Dinner Iesha Fair, CRNP   pregabalin 50 mg Oral TID Iesha Fair, CRNP   QUEtiapine 200 mg Oral QAM Iesha Fair, CRNP   QUEtiapine 400 mg Oral HS Iesha Fair, CRNP   zolpidem 10 mg Oral HS PRN Iesha Fair, CRNP        Today, Patient Was Seen By: Lucy Calderón MD    ** Please Note: Dragon 360 Dictation voice to text software may have been used in the creation of this document   **
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
## 2019-06-29 NOTE — ASSESSMENT & PLAN NOTE
Patient Hga1c 8 3 on 4/19  Continue home medications with dapagliflozin, lantus, metformin, victoza  Continue home novolog coverage and add sliding scale
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
## 2019-06-29 NOTE — SOCIAL WORK
Pt has LOS 0, observation  Pt resides alone in a second floor apt, has 14 steps to enter  Uses RW or cane as needed  Has home oxygen, portable device, and nebulizer provided by [employer] [facility]  [facility] to [facility]   History of STR at [facility]  Has BiPAP also  Uses [employer] at home for shopping  Uses taxi service for transportation  Had concerns about needing help at home  Has attempted to get [facility] in the past, but was denied due to working status at home  Pt works only sporadically , so encouraged him to try again  Will provide some info if helpful  Explained role of cm, no d/c needs anticipated  States a friend will drive him home  CM reviewed d/c planning process including the following: identifying help at home, patient preference for d/c planning needs, and availability of the treatment team to discuss questions or concerns patient and/or family may have regarding understanding of medications and recognizing signs and symptoms once discharged  CM also encouraged patient to follow up with all recommended appointments after discharge  Patient advised of importance for patient and family to participate in managing patient's medical well being

## 2019-06-29 NOTE — ASSESSMENT & PLAN NOTE
Chest pain atypical -serial troponins were negative  Continue aspirin, statin, metoprolol  Patient recently had a nuclear stress test which was unremarkable about a month ago by his primary cardiologist
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
## 2019-06-29 NOTE — OCCUPATIONAL THERAPY NOTE
OT EVALUATION     06/29/19 1140   Note Type   Note type Eval only   Restrictions/Precautions   Other Precautions O2;Fall Risk   Pain Assessment   Pain Assessment No/denies pain   Pain Score No Pain   Home Living   Type of Home Apartment  (2nd foor apt)   Home Layout One level;Stairs to enter with rails   Bathroom Shower/Tub Tub/shower unit   Home Equipment Walker;Cane  (bipap)   Additional Comments pt states he can drive but he doesn't   Prior Function   Level of [place] Independent with ADLs and functional mobility   Lives With   (roommate)   Receives Help From Friend(s)   ADL Assistance Independent   IADLs Independent   Falls in the last 6 months 0   Comments  SPO2%97% on 2 liters via NC   Subjective   Subjective "I want to go home "   ADL   Where Assessed Edge of bed   Eating Assistance 7  Independent   Grooming Assistance 5  Supervision/Setup   UB Dressing Assistance 4  Minimal Assistance   LB Dressing Assistance 4  Minimal Assistance   Toileting Assistance  5  Supervision/Setup   Bed Mobility   Rolling R 7  Independent   Rolling L 7  Independent   Supine to Sit 7  Independent   Sit to Supine 7  Independent   Transfers   Sit to Stand 5  Supervision   Stand to Sit 5  Supervision   Toilet transfer 5  Supervision   Functional Mobility   Functional Mobility 5  Supervision   Additional items Rolling walker   Balance   Static Sitting Fair +   Dynamic Sitting Fair +   Static Standing Fair   Dynamic Standing Fair   Activity Tolerance   Activity Tolerance Patient limited by fatigue   RUE Assessment   RUE Assessment WFL   LUE Assessment   LUE Assessment WFL   Cognition   Overall Cognitive Status WFL   Arousal/Participation Alert; Cooperative   Attention Within functional limits   Orientation Level Oriented X4   Memory Within functional limits   Following Commands Follows all commands and directions without difficulty   Assessment   Limitation Decreased ADL status; Decreased endurance;Decreased self-care trans;Decreased high-level ADLs  (SOB)   Prognosis Good   Assessment Patient evaluated by Occupational Therapy  Patient admitted with Atrial fibrillation with RVR ([facility] [address] )  The patients occupational profile, medical and therapy history includes a extensive additional review of physical, cognitive, or psychosocial history related to current functional performance  Comorbidities affecting functional mobility and ADLS include: afib, CAD, chronic back pain, CKD, diabetes, hypertension and obesity  Prior to admission, patient was independent with functional mobility with or without walker or cane, independent with ADLS, independent with IADLS and living with roommate in a single level home with 14 steps to enter  The evaluation identifies the following performance deficits: decreased endurance, increased fall risk, decreased ADLS, decreased IADLS, decreased activity tolerance and SOB upon exertion, that result in activity limitations and/or participation restrictions  This evaluation requires clinical decision making of high complexity, because the patient presents with comorbidites that affect occupational performance and required significant modification of tasks or assistance with consideration of multiple treatment options  The Barthel Index was used as a functional outcome tool presenting with a score of 55, indicating marked limitations of functional mobility and ADLS  Patient will benefit from skilled Occupational Therapy services to address above deficits and facilitate a safe return to prior level of function  Goals   Patient Goals "I want to go home "   STG Time Frame   (1-7 days)   Short Term Goal #1 Goals established to promote patient goal of "I want to go home ":  Patient will increase standing tolerance to 3 minutes during ADL task to decrease assistance level and decrease fall risk; Patient will increase bed mobility to independent in preparation for ADLS and transfers;  Patient will increase functional mobility to and from bathroom with rolling walker independently to increase performance with ADLS and to use a toilet; Patient will tolerate 5 minutes of UE ROM/strengthening to increase general activity tolerance and performance in ADLS/IADLS; Patient will improve functional activity tolerance to 8 minutes of sustained functional tasks to increase participation in basic self-care and decrease assistance level;    LTG Time Frame   (8-14 days)   Long Term Goal #1 Patient will increase standing tolerance to 5 minutes during ADL task to decrease assistance level and decrease fall risk; Patient will increase bed mobility to independent in preparation for ADLS and transfers; Patient will increase functional mobility to and from bathroom with single point cane independently to increase performance with ADLS and to use a toilet; Patient will tolerate 8 minutes of UE ROM/strengthening to increase general activity tolerance and performance in ADLS/IADLS; Patient will improve functional activity tolerance to 12 minutes of sustained functional tasks to increase participation in basic self-care and decrease assistance level; Functional Transfer Goals   Pt Will Perform All Functional Transfers   (STG: independent LTG: independent)   ADL Goals   Pt Will Perform All ADL's   (STG: supervision LTG: independent)   Plan   Treatment Interventions Activityengagement; Compensatory technique education;Equipment evaluation/education;Patient/family training;Functional transfer training; Endurance training;ADL retraining   Goal Expiration Date 07/13/19   OT Frequency 3-5x/wk   Recommendation   OT Discharge Recommendation Home OT   Equipment Recommended Tub seat with back   Barthel Index   Feeding 10   Bathing 0   Grooming Score 0   Dressing Score 5   Bladder Score 10   Bowels Score 10   Toilet Use Score 10   Transfers (Bed/Chair) Score 10   Mobility (Level Surface) Score 0   Stairs Score 0   Barthel Index Score 55   Licensure 2189 \[facility] [place] [facility]\"" Number  [address] [place] [address]# [id number]
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
## 2019-06-29 NOTE — ASSESSMENT & PLAN NOTE
Chest pain atypical - troponin x 2 negative, third is pending  Continue aspirin, statin, metoprolol  Cardiology consult  Plan per above

## 2019-06-29 NOTE — PHYSICAL THERAPY NOTE
PT EVALUATION       06/29/19 1120   Note Type   Note type Eval only   Pain Assessment   Pain Assessment No/denies pain   Home Living   Type of Home Apartment   Home Layout One level  (full flight to enter)   Home Equipment Cane;Walker  (53)   Prior Function   Level of [place] Independent with ADLs and functional mobility  (ambulates with 2L02)   Lives With Alone   ADL Assistance Independent   Falls in the last 6 months 0   Restrictions/Precautions   Other Precautions Pain; Fall Risk;O2   General   Additional Pertinent History Pt admitted with afib with RVR  Cognition   Overall Cognitive Status WFL   RLE Assessment   RLE Assessment WFL   LLE Assessment   LLE Assessment WFL   Bed Mobility   Supine to Sit 5  Supervision   Sit to Supine 5  Supervision   Transfers   Sit to Stand 5  Supervision   Stand to Sit 5  Supervision   Stand pivot 5  Supervision   Ambulation/Elevation   Gait pattern   (slow and tenative gait)   Gait Assistance 5  Supervision   Assistive Device Rolling walker  (2L19)   Distance 60 feet   Balance   Static Sitting Fair +   Dynamic Sitting Fair +   Static Standing Fair +   Dynamic Standing Fair   Endurance Deficit   Endurance Deficit   (Sp02 97%  bpm after activity)   Activity Tolerance   Activity Tolerance Patient limited by fatigue   Assessment   Prognosis Good   Problem List Decreased strength;Decreased endurance; Impaired balance;Decreased mobility;Obesity   Assessment Patient seen for Physical Therapy evaluation  Patient admitted with Atrial fibrillation with RVR ([facility] [address] )  Comorbidities affecting patient's physical performance include: bipolar affectivedisorder, obesity, htn, afib, DM with neuropathy, back pain, COPD  Personal factors affecting patient at time of initial evaluation include: stairs to enter home, inability to ambulate household distances, inability to navigate community distances and inability to perform dynamic tasks in community   Prior to admission, patient was independent with functional mobility without assistive device  Please find objective findings from Physical Therapy assessment regarding body systems outlined above with impairments and limitations including weakness, impaired balance, decreased endurance, gait deviations, decreased activity tolerance, decreased functional mobility tolerance, fall risk and SOB upon exertion  The Barthel Index was used as a functional outcome tool presenting with a score of 55 today indicating marked limitations of functional mobility and ADLS  Patient's clinical presentation is currently unstable/unpredictable as seen in patient's presentation of changing level of pain, increased fall risk, new onset of impairment of functional mobility, decreased endurance and new onset of weakness  Pt would benefit from continued Physical Therapy treatment to address deficits as defined above and maximize level of functional mobility  As demonstrated by objective findings, the assigned level of complexity for this evaluation is high  Goals   Patient Goals "I want to go home"   STG Expiration Date 07/06/19   Short Term Goal #1 independent bed mobility, independent transfers, independent ambulation with walker 150 feet with minimal SOB   LTG Expiration Date 07/12/19   Long Term Goal #1 independent up and down 12 steps so pt can get into his apartment, independent ambulation 200 feet with 2L02 with minimal SOB outdoor surfaces, improve standing tolerance to at least 10 minutes so pt can prepare an easy meal    Plan   Treatment/Interventions ADL retraining;LE strengthening/ROM; Functional transfer training;Elevations; Therapeutic exercise; Endurance training;Cognitive reorientation;Patient/family training;Equipment eval/education;Gait training   PT Frequency 5x/wk   Recommendation   Recommendation Home PT   Equipment Recommended   (pt has a walker, cane, 02)   Barthel Index   Feeding 10   Bathing 0   Grooming Score 0   Dressing Score 5   Bladder Score 10   Bowels Score 10   Toilet Use Score 10   Transfers (Bed/Chair) Score 10   Mobility (Level Surface) Score 0   Stairs Score 0   Barthel Index Score 54   Licensure   [place] License Number  [place] GOODWIN [id number]

## 2019-06-29 NOTE — PLAN OF CARE
Problem: Potential for Falls  Goal: Patient will remain free of falls  Description  INTERVENTIONS:  - Assess patient frequently for physical needs  -  Identify cognitive and physical deficits and behaviors that affect risk of falls    -  [place] fall precautions as indicated by assessment   - Educate patient/family on patient safety including physical limitations  - Instruct patient to call for assistance with activity based on assessment  - Modify environment to reduce risk of injury  - Consider OT/PT consult to assist with strengthening/mobility  Outcome: Progressing     Problem: PAIN - ADULT  Goal: Verbalizes/displays adequate comfort level or baseline comfort level  Description  Interventions:  - Encourage patient to monitor pain and request assistance  - Assess pain using appropriate pain scale  - Administer analgesics based on type and severity of pain and evaluate response  - Implement non-pharmacological measures as appropriate and evaluate response  - Consider cultural and social influences on pain and pain management  - Notify physician/advanced practitioner if interventions unsuccessful or patient reports new pain  Outcome: Progressing     Problem: DISCHARGE PLANNING  Goal: Discharge to home or other facility with appropriate resources  Description  INTERVENTIONS:  - Identify barriers to discharge w/patient and caregiver  - Arrange for needed discharge resources and transportation as appropriate  - Identify discharge learning needs (meds, wound care, etc )  - Refer to Case Management Department for coordinating discharge planning if the patient needs post-hospital services based on physician/advanced practitioner order or complex needs related to functional status, cognitive ability, or social support system   Outcome: Progressing     Problem: Knowledge Deficit  Goal: Patient/family/caregiver demonstrates understanding of disease process, treatment plan, medications, and discharge instructions  Description  Complete learning assessment and assess knowledge base  Interventions:  - Provide teaching at level of understanding  - Provide teaching via preferred learning methods  Outcome: Progressing     Problem: CARDIOVASCULAR - ADULT  Goal: Maintains optimal cardiac output and hemodynamic stability  Description  INTERVENTIONS:  - Monitor I/O, vital signs and rhythm  - Monitor for S/S and trends of decreased cardiac output i e  bleeding, hypotension  - Administer and titrate ordered vasoactive medications to optimize hemodynamic stability  - Assess quality of pulses, skin color and temperature  - Assess for signs of decreased coronary artery perfusion - ex   Angina  - Instruct patient to report change in severity of symptoms  Outcome: Progressing  Goal: Absence of cardiac dysrhythmias or at baseline rhythm  Description  INTERVENTIONS:  - Continuous cardiac monitoring, monitor vital signs, obtain 12 lead EKG if indicated  - Administer antiarrhythmic and heart rate control medications as ordered  - Monitor electrolytes and administer replacement therapy as ordered  Outcome: Progressing     Problem: RESPIRATORY - ADULT  Goal: Achieves optimal ventilation and oxygenation  Description  INTERVENTIONS:  - Assess for changes in respiratory status  - Assess for changes in mentation and behavior  - Position to facilitate oxygenation and minimize respiratory effort  - Oxygen administration by appropriate delivery method based on oxygen saturation (per order) or ABGs  - Encourage broncho-pulmonary hygiene including cough, deep breathe, Incentive Spirometry  - Assess the need for suctioning and aspirate as needed  - Assess and instruct to report SOB or any respiratory difficulty  - Respiratory Therapy support as indicated  - Bipap HS for SHAVONNE   Outcome: Progressing

## 2019-06-29 NOTE — ASSESSMENT & PLAN NOTE
  Patient patient presented with exertional shortness of breath, fatigue, chest pain and palpitations   Patient's heart rate was uncontrolled in the ED   Patient was recently taken off Cardizem due to cardiomyopathy by his primary cardiologist   Wil Marquez Patient's metoprolol will be increased to 100 milligram b i d   And 50 milligrams in the afternoon

## 2019-06-30 LAB
GLUCOSE SERPL-MCNC: 116 MG/DL (ref 65–140)
GLUCOSE SERPL-MCNC: 119 MG/DL (ref 65–140)
GLUCOSE SERPL-MCNC: 167 MG/DL (ref 65–140)
GLUCOSE SERPL-MCNC: 196 MG/DL (ref 65–140)
GLUCOSE SERPL-MCNC: 230 MG/DL (ref 65–140)
MRSA NOSE QL CULT: NORMAL

## 2019-06-30 PROCEDURE — 94760 N-INVAS EAR/PLS OXIMETRY 1: CPT

## 2019-06-30 PROCEDURE — 99232 SBSQ HOSP IP/OBS MODERATE 35: CPT | Performed by: INTERNAL MEDICINE

## 2019-06-30 PROCEDURE — 94640 AIRWAY INHALATION TREATMENT: CPT

## 2019-06-30 PROCEDURE — 82948 REAGENT STRIP/BLOOD GLUCOSE: CPT

## 2019-06-30 PROCEDURE — 99233 SBSQ HOSP IP/OBS HIGH 50: CPT | Performed by: INTERNAL MEDICINE

## 2019-06-30 RX ORDER — METOPROLOL TARTRATE 100 MG/1
100 TABLET ORAL EVERY 8 HOURS
Status: DISCONTINUED | OUTPATIENT
Start: 2019-06-30 | End: 2019-07-01 | Stop reason: HOSPADM

## 2019-06-30 RX ADMIN — APIXABAN 5 MG: 5 TABLET, FILM COATED ORAL at 17:09

## 2019-06-30 RX ADMIN — METOPROLOL TARTRATE 100 MG: 100 TABLET, FILM COATED ORAL at 13:46

## 2019-06-30 RX ADMIN — PREGABALIN 50 MG: 50 CAPSULE ORAL at 20:49

## 2019-06-30 RX ADMIN — METOPROLOL TARTRATE 100 MG: 100 TABLET, FILM COATED ORAL at 08:27

## 2019-06-30 RX ADMIN — IPRATROPIUM BROMIDE AND ALBUTEROL SULFATE 3 ML: 2.5; .5 SOLUTION RESPIRATORY (INHALATION) at 07:15

## 2019-06-30 RX ADMIN — ASPIRIN 81 MG 81 MG: 81 TABLET ORAL at 08:26

## 2019-06-30 RX ADMIN — PANTOPRAZOLE SODIUM 20 MG: 20 TABLET, DELAYED RELEASE ORAL at 06:19

## 2019-06-30 RX ADMIN — FENOFIBRATE 145 MG: 145 TABLET, COATED ORAL at 08:27

## 2019-06-30 RX ADMIN — FLUTICASONE FUROATE AND VILANTEROL TRIFENATATE 1 PUFF: 100; 25 POWDER RESPIRATORY (INHALATION) at 08:26

## 2019-06-30 RX ADMIN — METFORMIN HYDROCHLORIDE 500 MG: 500 TABLET, EXTENDED RELEASE ORAL at 08:28

## 2019-06-30 RX ADMIN — IPRATROPIUM BROMIDE AND ALBUTEROL SULFATE 3 ML: 2.5; .5 SOLUTION RESPIRATORY (INHALATION) at 02:09

## 2019-06-30 RX ADMIN — QUETIAPINE FUMARATE 400 MG: 200 TABLET, EXTENDED RELEASE ORAL at 21:25

## 2019-06-30 RX ADMIN — ZOLPIDEM TARTRATE 10 MG: 5 TABLET, COATED ORAL at 21:25

## 2019-06-30 RX ADMIN — BUSPIRONE HYDROCHLORIDE 15 MG: 5 TABLET ORAL at 08:27

## 2019-06-30 RX ADMIN — INSULIN LISPRO 2 UNITS: 100 INJECTION, SOLUTION INTRAVENOUS; SUBCUTANEOUS at 21:26

## 2019-06-30 RX ADMIN — PREGABALIN 50 MG: 50 CAPSULE ORAL at 08:27

## 2019-06-30 RX ADMIN — LOSARTAN POTASSIUM 50 MG: 50 TABLET, FILM COATED ORAL at 08:26

## 2019-06-30 RX ADMIN — IPRATROPIUM BROMIDE AND ALBUTEROL SULFATE 3 ML: 2.5; .5 SOLUTION RESPIRATORY (INHALATION) at 14:18

## 2019-06-30 RX ADMIN — METFORMIN HYDROCHLORIDE 500 MG: 500 TABLET, EXTENDED RELEASE ORAL at 15:39

## 2019-06-30 RX ADMIN — PRAVASTATIN SODIUM 40 MG: 40 TABLET ORAL at 15:39

## 2019-06-30 RX ADMIN — INSULIN LISPRO 18 UNITS: 100 INJECTION, SOLUTION INTRAVENOUS; SUBCUTANEOUS at 11:44

## 2019-06-30 RX ADMIN — APIXABAN 5 MG: 5 TABLET, FILM COATED ORAL at 08:27

## 2019-06-30 RX ADMIN — INSULIN LISPRO 2 UNITS: 100 INJECTION, SOLUTION INTRAVENOUS; SUBCUTANEOUS at 08:28

## 2019-06-30 RX ADMIN — IPRATROPIUM BROMIDE AND ALBUTEROL SULFATE 3 ML: 2.5; .5 SOLUTION RESPIRATORY (INHALATION) at 19:43

## 2019-06-30 RX ADMIN — BUSPIRONE HYDROCHLORIDE 15 MG: 5 TABLET ORAL at 17:09

## 2019-06-30 RX ADMIN — INSULIN LISPRO 4 UNITS: 100 INJECTION, SOLUTION INTRAVENOUS; SUBCUTANEOUS at 11:44

## 2019-06-30 RX ADMIN — PREGABALIN 50 MG: 50 CAPSULE ORAL at 15:39

## 2019-06-30 RX ADMIN — INSULIN GLARGINE 45 UNITS: 100 INJECTION, SOLUTION SUBCUTANEOUS at 21:26

## 2019-06-30 RX ADMIN — METOPROLOL TARTRATE 100 MG: 100 TABLET, FILM COATED ORAL at 21:25

## 2019-06-30 RX ADMIN — QUETIAPINE FUMARATE 200 MG: 200 TABLET, EXTENDED RELEASE ORAL at 08:26

## 2019-06-30 RX ADMIN — LOSARTAN POTASSIUM 50 MG: 50 TABLET, FILM COATED ORAL at 17:09

## 2019-06-30 RX ADMIN — INSULIN LISPRO 20 UNITS: 100 INJECTION, SOLUTION INTRAVENOUS; SUBCUTANEOUS at 08:27

## 2019-06-30 NOTE — ASSESSMENT & PLAN NOTE
  Patient patient presented with exertional shortness of breath, fatigue, chest pain and palpitations   Patient's heart rate was uncontrolled in the ED   Patient was recently taken off Cardizem due to cardiomyopathy by his primary cardiologist    Patient is being treated with up titrating beta-blockers   Will increase metoprolol to 100 milligram p o  T i d    If heart rate continues to remain uncontrolled possible adding digoxin   Patient is on anticoagulation with Eliquis

## 2019-06-30 NOTE — PROGRESS NOTES
Progress Note - Cardiology   Mickie Hernandez 60 y o  male MRN: [id number]  Unit/Bed#: [address] [place] [address] Encounter: [id number]    Assessment/Plan:  Atrial fibrillation with rapid ventricular rate- AFib remains suboptimally controlled  Plan to up titrate his beta-blocker to metoprolol 100mg TID  Possible addition of digoxin if heart rates sub-optimal controlled  Previously patient was on dual AV paris blockers  Given his cardiomyopathy his Cardizem was discontinued and he was placed on losartan  Will try to up titrate his beta-blocker  Target resting heart rates below 100  Follow-up with his outpatient cardiologist   He will continue on his Eliquis therapy  Nonischemic cardiomyopathy EF 45%- up titration of his beta-blocker  Continue losartan therapy    No evidence of significant volume overload  Type 2 diabetes mellitus with long-term insulin use last A1c 8 3 on April 2019  Sleep apnea /Chronic respiratory failure with hypoxia on 2 L nasal cannula at night  Hypertension  Morbid obesity  Dyslipidemia- currently on fenofibrate  Bipolar affective disorder    Subjective/Objective   Heart rates are sub-optimal control    Objective:     Vitals: /79 (BP Location: Left arm)   Pulse 96   Temp 97 7 °F (36 5 °C) (Oral)   Resp 19   Ht 5' 11" (1 803 m)   Wt 128 kg (281 lb 15 5 oz)   SpO2 97%   BMI 39 33 kg/m²   Vitals:    06/28/19 1930 06/29/19 0600   Weight: 128 kg (282 lb) 128 kg (281 lb 15 5 oz)     Orthostatic Blood Pressures      Most Recent Value   Blood Pressure  114/79 filed at 06/30/2019 1105   Patient Position - Orthostatic VS  Sitting filed at 06/30/2019 1105            Intake/Output Summary (Last 24 hours) at 6/30/2019 1256  Last data filed at 6/30/2019 1105  Gross per 24 hour   Intake 800 ml   Output 1200 ml   Net -400 ml       Invasive Devices     Peripheral Intravenous Line            Peripheral IV 06/28/19 Left Antecubital 1 day              Review of Systems: reviewed    Physical Exam Constitutional: He is oriented to person, place, and time  No distress  HENT:   Head: Normocephalic and atraumatic  Right Ear: External ear normal    Left Ear: External ear normal    Nose: Nose normal    Mouth/Throat: No oropharyngeal exudate  Eyes: Pupils are equal, round, and reactive to light  Conjunctivae are normal  Right eye exhibits no discharge  Left eye exhibits no discharge  No scleral icterus  Neck: Normal range of motion  JVD present  No tracheal deviation present  No thyromegaly present  Cardiovascular: Intact distal pulses  An irregularly irregular rhythm present  Exam reveals gallop  Exam reveals no friction rub  Murmur heard  Pulmonary/Chest: Effort normal and breath sounds normal  No stridor  No respiratory distress  He has no wheezes  He has no rales  He exhibits no tenderness  Abdominal: Soft  Bowel sounds are normal  He exhibits distension  He exhibits no mass  There is no tenderness  There is no rebound and no guarding  Genitourinary:   Genitourinary Comments: No CVA tenderness   Musculoskeletal: He exhibits deformity  He exhibits no edema or tenderness  Neurological: He is alert and oriented to person, place, and time  He displays normal reflexes  He exhibits normal muscle tone  Skin: Skin is warm and dry  No rash noted  He is not diaphoretic  No erythema  Psychiatric: He has a normal mood and affect  His behavior is normal  Judgment and thought content normal      Lab Results: I have personally reviewed pertinent lab results  Imaging: I have personally reviewed pertinent reports  EKG: reviewed  VTE Pharmacologic Prophylaxis: Sequential compression device (Venodyne)   VTE Mechanical Prophylaxis: sequential compression device    Counseling / Coordination of Care  Total time spent today 40 minutes  Greater than 50% of total time was spent with the patient and / or family counseling and / or coordination of care   A description of the counseling / coordination of care: afb with rvr

## 2019-06-30 NOTE — PROGRESS NOTES
Progress Note Gunnar Drew 1960, 60 y o  male MRN: [id number]    Unit/Bed#: [address] [facility] Encounter: [id number]    Primary Care Provider: Cata Luna MD   Date and time admitted to hospital: 6/28/2019  1:11 PM        * Atrial fibrillation with RVR [facility])  Assessment & Plan    Patient patient presented with exertional shortness of breath, fatigue, chest pain and palpitations   Patient's heart rate was uncontrolled in the ED   Patient was recently taken off Cardizem due to cardiomyopathy by his primary cardiologist    Patient is being treated with up titrating beta-blockers   Will increase metoprolol to 100 milligram p o  T i d    If heart rate continues to remain uncontrolled possible adding digoxin   Patient is on anticoagulation with Eliquis  SHAVONNE and COPD overlap syndrome (HCC)  Assessment & Plan  Continue bipap q h s    No evidence of COPD exacerbation  Continue Breo Ellipta and nebulizers    Type 2 diabetes mellitus with complication, with long-term current use of insulin [facility])  Assessment & Plan  Patient Hga1c 8 3 on 4/19  Continue home medications with dapagliflozin, lantus, metformin, victoza  Continue home novolog coverage and add sliding scale    Chronic respiratory failure with hypoxia (HCC)  Assessment & Plan  Pulse ox stable  Continue 2 liters of oxygen continuously which he uses at home  Cpap at night    Benign essential hypertension  Assessment & Plan  Continue losartan and metoprolol    CAD (coronary artery disease)  Assessment & Plan  Chest pain atypical -serial troponins were negative  Continue aspirin, statin, metoprolol  Patient recently had a nuclear stress test which was unremarkable about a month ago by his primary cardiologist     Bipolar affective disorder ([facility] [place] [address] )  Assessment & Plan  Continue buspar, seroquel, ambien          VTE Pharmacologic Prophylaxis:   Pharmacologic: Apixaban (Eliquis)  Mechanical VTE Prophylaxis in Place: Yes    Patient Centered Rounds: I have performed bedside rounds with nursing staff today  Discussions with Specialists or Other Care Team Provider: [facility]  Education and Discussions with Family / Patient:Yes  Time Spent for Care: 45 minutes  More than 50% of total time spent on counseling and coordination of care as described above  Current Length of Stay: 1 day(s)  Current Patient Status: Inpatient     Discharge Plan:  Home    Code Status: Level 1 - Full Code      Subjective:   Patient denies any chest pain, shortness of breath or palpitations  Objective:     Vitals:   Temp (24hrs), Av 7 °F (36 5 °C), Min:97 °F (36 1 °C), Max:98 4 °F (36 9 °C)    Temp:  [97 °F (36 1 °C)-98 4 °F (36 9 °C)] 97 9 °F (36 6 °C)  HR:  [] 93  Resp:  [18-21] 18  BP: (106-141)/() 126/88  SpO2:  [95 %-98 %] 96 %  Body mass index is 39 33 kg/m²  Input and Output Summary (last 24 hours): Intake/Output Summary (Last 24 hours) at 2019 1733  Last data filed at 2019 1700  Gross per 24 hour   Intake 1310 ml   Output 1200 ml   Net 110 ml        Physical Exam:     Physical Exam   Constitutional: No distress  HENT:   Head: Normocephalic and atraumatic  Eyes: Pupils are equal, round, and reactive to light  Conjunctivae are normal    Neck: Normal range of motion  Neck supple  Cardiovascular:   Murmur heard  Irregularly irregular   Pulmonary/Chest: Effort normal  No respiratory distress  He has no wheezes  He has no rhonchi  He has no rales  He exhibits no tenderness  Abdominal: Soft  Bowel sounds are normal  He exhibits no distension  There is no tenderness  There is no rebound and no guarding  Musculoskeletal: He exhibits no edema  Neurological: He is alert  No cranial nerve deficit  Skin: Skin is warm and dry  No rash noted         Additional Data:     Labs:    Results from last 7 days   Lab Units 19  0635 19  1344   WBC Thousand/uL 7 22 10 22*   HEMOGLOBIN g/dL 15 1 16 8   HEMATOCRIT % 44 9 50 5*   PLATELETS Thousands/uL 143* 185   NEUTROS PCT % 39* 56     Results from last 7 days   Lab Units 06/29/19  0445 06/28/19  1344   SODIUM mmol/L 138 139   POTASSIUM mmol/L 4 0 4 3   CHLORIDE mmol/L 104 101   CO2 mmol/L 26 24   BUN mg/dL 21 15   CREATININE mg/dL 1 09 1 32*   CALCIUM mg/dL 8 7 9 4   TOTAL BILIRUBIN mg/dL  --  0 90   ALK PHOS U/L  --  51   ALT U/L  --  40   AST U/L  --  25         Results from last 7 days   Lab Units 06/28/19  2043 06/28/19  1632 06/28/19  1344   TROPONIN I ng/mL 0 02 <0 02 <0 02     Lab Results   Component Value Date/Time    HGBA1C 8 3 (H) 04/19/2019 10:19 AM    HGBA1C 8 0 07/21/2017 09:27 AM     Results from last 7 days   Lab Units 06/30/19  1621 06/30/19  1104 06/30/19  0709 06/30/19  0158 06/29/19  2036 06/29/19  1604 06/29/19  1119 06/29/19  0712 06/28/19  2111 06/28/19  1316   POC GLUCOSE mg/dl 116 230* 167* 119 101 118 231* 171* 272* 201*           * I Have Reviewed All Lab Data Listed Above  * Additional Pertinent Lab Tests Reviewed: [address] Admission Reviewed    Imaging:     XR chest 1 view portable   Final Result by Tanmay Sales MD (06/28 1446)   Stable exam    No acute cardiopulmonary disease  Workstation performed: [id number]           Imaging Reports Reviewed by myself    Cultures:   Blood Culture:   Lab Results   Component Value Date    BLOODCX No Growth After 5 Days  11/16/2018    BLOODCX Staphylococcus coagulase negative (A) 11/16/2018    BLOODCX No Growth After 5 Days  10/31/2018    BLOODCX No Growth After 5 Days  10/31/2018    BLOODCX No Growth After 5 Days  09/06/2017    BLOODCX No Growth After 5 Days   09/06/2017     Urine Culture:   Lab Results   Component Value Date    URINECX 5471-0165 cfu/ml Mixed Contaminants X2 03/20/2017    URINECX No Growth <1000 cfu/mL 11/27/2016    URINECX No Growth <1000 cfu/mL 09/02/2016     Sputum Culture: No components found for: SPUTUMCX  Wound Culture: No results found for: WOUNDCULT    Last 24 Hours Medication List:     Current Facility-Administered Medications:  apixaban 5 mg Oral BID Leobardo Hearing, CRNP   aspirin 81 mg Oral QAM Leobardo Hearing, CRNP   busPIRone 15 mg Oral BID Leobardo Hearing, CRNP   Dapagliflozin Propanediol 10 mg Oral QAM Leobardo Hearing, CRNP   fenofibrate 145 mg Oral Daily Leobardo Hearing, CRNP   fluticasone-vilanterol 1 puff Inhalation Daily Leobardo Hearing, CRNP   insulin glargine 45 Units Subcutaneous HS Leobardo Hearing, CRNP   insulin lispro 1-6 Units Subcutaneous HS Leobardo Hearing, CRNP   insulin lispro 18 Units Subcutaneous Daily Before Lunch Leobardo Hearing, CRNP   insulin lispro 2-12 Units Subcutaneous TID AC Leobardo Hearing, CRNP   insulin lispro 20 Units Subcutaneous Daily Before Breakfast Leobardo Hearing, CRNP   insulin lispro 28 Units Subcutaneous Daily With Dinner Leobardo Hearing, CRNP   ipratropium-albuterol 3 mL Nebulization Q6H Leobardo Hearing, CRNP   liraglutide 1 8 mg Subcutaneous Daily Leobardo Hearing, CRNP   losartan 50 mg Oral BID Leobardo Hearing, CRNP   metFORMIN 500 mg Oral BID With Meals Leobardo Hearing, CRNP   metoprolol tartrate 100 mg Oral Q8H Dominick Kramer MD   morphine injection 2 mg Intravenous Q6H PRN Deborah Hooker MD   ondansetron 4 mg Intravenous Q6H PRN Leobardo Hearing, CRNP   pantoprazole 20 mg Oral Early Morning Leobardo Hearing, CRNP   pravastatin 40 mg Oral Daily With Dinner Leobardo Hearing, CRNP   pregabalin 50 mg Oral TID Leobardo Hearing, CRNP   QUEtiapine 200 mg Oral QAM Leobardo Hearing, CRNP   QUEtiapine 400 mg Oral HS Leobardo Hearing, CRNP   zolpidem 10 mg Oral HS PRN Leobardo Hearing, CRNP        Today, Patient Was Seen By: Daniel Hendrickson MD    ** Please Note: Dragon 360 Dictation voice to text software may have been used in the creation of this document   **

## 2019-06-30 NOTE — PLAN OF CARE
Problem: Potential for Falls  Goal: Patient will remain free of falls  Description  INTERVENTIONS:  - Assess patient frequently for physical needs  -  Identify cognitive and physical deficits and behaviors that affect risk of falls    -  [place] fall precautions as indicated by assessment   - Educate patient/family on patient safety including physical limitations  - Instruct patient to call for assistance with activity based on assessment  - Modify environment to reduce risk of injury  - Consider OT/PT consult to assist with strengthening/mobility  6/30/2019 1033 by Anuel Mejia RN  Outcome: Progressing  6/30/2019 1033 by Anuel Mejia RN  Outcome: Progressing     Problem: PAIN - ADULT  Goal: Verbalizes/displays adequate comfort level or baseline comfort level  Description  Interventions:  - Encourage patient to monitor pain and request assistance  - Assess pain using appropriate pain scale  - Administer analgesics based on type and severity of pain and evaluate response  - Implement non-pharmacological measures as appropriate and evaluate response  - Consider cultural and social influences on pain and pain management  - Notify physician/advanced practitioner if interventions unsuccessful or patient reports new pain  6/30/2019 1033 by Anuel Mejia RN  Outcome: Progressing  6/30/2019 1033 by Anuel Mejia RN  Outcome: Progressing     Problem: DISCHARGE PLANNING  Goal: Discharge to home or other facility with appropriate resources  Description  INTERVENTIONS:  - Identify barriers to discharge w/patient and caregiver  - Arrange for needed discharge resources and transportation as appropriate  - Identify discharge learning needs (meds, wound care, etc )  - Refer to Case Management Department for coordinating discharge planning if the patient needs post-hospital services based on physician/advanced practitioner order or complex needs related to functional status, cognitive ability, or social support system   6/30/2019 1033 by Gee Del Toro RN  Outcome: Progressing  6/30/2019 1033 by Gee Del Toro RN  Outcome: Progressing     Problem: Knowledge Deficit  Goal: Patient/family/caregiver demonstrates understanding of disease process, treatment plan, medications, and discharge instructions  Description  Complete learning assessment and assess knowledge base  Interventions:  - Provide teaching at level of understanding  - Provide teaching via preferred learning methods  6/30/2019 1033 by Gee Del Toro RN  Outcome: Progressing  6/30/2019 1033 by Gee Del Toro RN  Outcome: Progressing     Problem: CARDIOVASCULAR - ADULT  Goal: Maintains optimal cardiac output and hemodynamic stability  Description  INTERVENTIONS:  - Monitor I/O, vital signs and rhythm  - Monitor for S/S and trends of decreased cardiac output i e  bleeding, hypotension  - Administer and titrate ordered vasoactive medications to optimize hemodynamic stability  - Assess quality of pulses, skin color and temperature  - Assess for signs of decreased coronary artery perfusion - ex   Angina  - Instruct patient to report change in severity of symptoms  6/30/2019 1033 by Gee Del Toro RN  Outcome: Progressing  6/30/2019 1033 by Gee Del Toro RN  Outcome: Progressing  Goal: Absence of cardiac dysrhythmias or at baseline rhythm  Description  INTERVENTIONS:  - Continuous cardiac monitoring, monitor vital signs, obtain 12 lead EKG if indicated  - Administer antiarrhythmic and heart rate control medications as ordered  - Monitor electrolytes and administer replacement therapy as ordered  6/30/2019 1033 by Gee Del Toro RN  Outcome: Progressing  6/30/2019 1033 by Gee Del Toro RN  Outcome: Progressing     Problem: RESPIRATORY - ADULT  Goal: Achieves optimal ventilation and oxygenation  Description  INTERVENTIONS:  - Assess for changes in respiratory status  - Assess for changes in mentation and behavior  - Position to facilitate oxygenation and minimize respiratory effort  - Oxygen administration by appropriate delivery method based on oxygen saturation (per order) or ABGs  - Encourage broncho-pulmonary hygiene including cough, deep breathe, Incentive Spirometry  - Assess the need for suctioning and aspirate as needed  - Assess and instruct to report SOB or any respiratory difficulty  - Respiratory Therapy support as indicated  - Bipap HS for SHAVONNE   6/30/2019 1033 by Nataly Kincaid, RN  Outcome: Progressing  6/30/2019 1033 by Nataly Kincaid, RN  Outcome: Progressing

## 2019-07-01 ENCOUNTER — TRANSITIONAL CARE MANAGEMENT (OUTPATIENT)
Dept: FAMILY MEDICINE CLINIC | Facility: CLINIC | Age: 59
End: 2019-07-01

## 2019-07-01 VITALS
OXYGEN SATURATION: 95 % | WEIGHT: 279.54 LBS | DIASTOLIC BLOOD PRESSURE: 78 MMHG | HEIGHT: 71 IN | TEMPERATURE: 98.1 F | RESPIRATION RATE: 18 BRPM | BODY MASS INDEX: 39.14 KG/M2 | HEART RATE: 88 BPM | SYSTOLIC BLOOD PRESSURE: 123 MMHG

## 2019-07-01 LAB
ANION GAP SERPL CALCULATED.3IONS-SCNC: 9 MMOL/L (ref 4–13)
BUN SERPL-MCNC: 19 MG/DL (ref 5–25)
CALCIUM SERPL-MCNC: 9.3 MG/DL (ref 8.3–10.1)
CHLORIDE SERPL-SCNC: 101 MMOL/L (ref 100–108)
CO2 SERPL-SCNC: 29 MMOL/L (ref 21–32)
CREAT SERPL-MCNC: 1.07 MG/DL (ref 0.6–1.3)
GFR SERPL CREATININE-BSD FRML MDRD: 76 ML/MIN/1.73SQ M
GLUCOSE SERPL-MCNC: 131 MG/DL (ref 65–140)
GLUCOSE SERPL-MCNC: 132 MG/DL (ref 65–140)
GLUCOSE SERPL-MCNC: 216 MG/DL (ref 65–140)
POTASSIUM SERPL-SCNC: 3.7 MMOL/L (ref 3.5–5.3)
SODIUM SERPL-SCNC: 139 MMOL/L (ref 136–145)

## 2019-07-01 PROCEDURE — 82948 REAGENT STRIP/BLOOD GLUCOSE: CPT

## 2019-07-01 PROCEDURE — 94640 AIRWAY INHALATION TREATMENT: CPT

## 2019-07-01 PROCEDURE — 94660 CPAP INITIATION&MGMT: CPT

## 2019-07-01 PROCEDURE — 80048 BASIC METABOLIC PNL TOTAL CA: CPT | Performed by: INTERNAL MEDICINE

## 2019-07-01 PROCEDURE — 97530 THERAPEUTIC ACTIVITIES: CPT

## 2019-07-01 PROCEDURE — 99239 HOSP IP/OBS DSCHRG MGMT >30: CPT | Performed by: INTERNAL MEDICINE

## 2019-07-01 PROCEDURE — 94760 N-INVAS EAR/PLS OXIMETRY 1: CPT

## 2019-07-01 RX ORDER — METOPROLOL TARTRATE 100 MG/1
100 TABLET ORAL EVERY 8 HOURS
Qty: 60 TABLET | Refills: 0 | Status: SHIPPED | OUTPATIENT
Start: 2019-07-01 | End: 2019-07-25 | Stop reason: ALTCHOICE

## 2019-07-01 RX ADMIN — IPRATROPIUM BROMIDE AND ALBUTEROL SULFATE 3 ML: 2.5; .5 SOLUTION RESPIRATORY (INHALATION) at 07:45

## 2019-07-01 RX ADMIN — INSULIN LISPRO 20 UNITS: 100 INJECTION, SOLUTION INTRAVENOUS; SUBCUTANEOUS at 08:25

## 2019-07-01 RX ADMIN — LOSARTAN POTASSIUM 50 MG: 50 TABLET, FILM COATED ORAL at 08:20

## 2019-07-01 RX ADMIN — METFORMIN HYDROCHLORIDE 500 MG: 500 TABLET, EXTENDED RELEASE ORAL at 08:22

## 2019-07-01 RX ADMIN — ASPIRIN 81 MG 81 MG: 81 TABLET ORAL at 08:20

## 2019-07-01 RX ADMIN — FLUTICASONE FUROATE AND VILANTEROL TRIFENATATE 1 PUFF: 100; 25 POWDER RESPIRATORY (INHALATION) at 08:21

## 2019-07-01 RX ADMIN — FENOFIBRATE 145 MG: 145 TABLET, COATED ORAL at 08:20

## 2019-07-01 RX ADMIN — IPRATROPIUM BROMIDE AND ALBUTEROL SULFATE 3 ML: 2.5; .5 SOLUTION RESPIRATORY (INHALATION) at 01:21

## 2019-07-01 RX ADMIN — BUSPIRONE HYDROCHLORIDE 15 MG: 5 TABLET ORAL at 08:20

## 2019-07-01 RX ADMIN — INSULIN LISPRO 18 UNITS: 100 INJECTION, SOLUTION INTRAVENOUS; SUBCUTANEOUS at 12:11

## 2019-07-01 RX ADMIN — INSULIN LISPRO 4 UNITS: 100 INJECTION, SOLUTION INTRAVENOUS; SUBCUTANEOUS at 12:11

## 2019-07-01 RX ADMIN — IPRATROPIUM BROMIDE AND ALBUTEROL SULFATE 3 ML: 2.5; .5 SOLUTION RESPIRATORY (INHALATION) at 13:34

## 2019-07-01 RX ADMIN — APIXABAN 5 MG: 5 TABLET, FILM COATED ORAL at 08:20

## 2019-07-01 RX ADMIN — PANTOPRAZOLE SODIUM 20 MG: 20 TABLET, DELAYED RELEASE ORAL at 06:26

## 2019-07-01 RX ADMIN — QUETIAPINE FUMARATE 200 MG: 200 TABLET, EXTENDED RELEASE ORAL at 08:25

## 2019-07-01 RX ADMIN — PREGABALIN 50 MG: 50 CAPSULE ORAL at 08:20

## 2019-07-01 RX ADMIN — METOPROLOL TARTRATE 100 MG: 100 TABLET, FILM COATED ORAL at 06:26

## 2019-07-01 NOTE — NURSING NOTE
Discharge Instructions provided  An education regarding the discharge medications provided to the patient  Diabetic education also provided to the patient and printouts handed to the patient

## 2019-07-01 NOTE — PHYSICAL THERAPY NOTE
PT TREATMENT     07/01/19 1006   Pain Assessment   Pain Assessment No/denies pain   Restrictions/Precautions   Other Precautions O2;Fall Risk   General   Chart Reviewed Yes   Cognition   Overall Cognitive Status WFL   Subjective   Subjective "doing better, maybe going home today"   Bed Mobility   Supine to Sit 7  Independent   Sit to Supine 7  Independent   Transfers   Sit to Stand 7  Independent   Stand to Sit 7  Independent   Stand pivot 7  Independent   Ambulation/Elevation   Gait Assistance 5  Supervision   Assistive Device Rolling walker  (4V07)   Distance 120 feet with walker, 20 feet without walker   Stair Management Technique Two rails   Number of Stairs 9   Balance   Static Sitting Fair +   Dynamic Sitting Fair +   Static Standing Fair   Dynamic Standing Fair   Activity Tolerance   Activity Tolerance Patient tolerated treatment well;Patient limited by fatigue   Assessment   Prognosis Good   Problem List Decreased strength;Decreased endurance; Impaired balance;Decreased mobility   Assessment Pt demonstrates improving endurance and function  Plan   Treatment/Interventions ADL retraining;Functional transfer training;LE strengthening/ROM; Therapeutic exercise; Endurance training;Gait training;Bed mobility; Equipment eval/education;Patient/family training;Spoke to case management   Progress Progressing toward goals   PT Frequency 5x/wk   Recommendation   Recommendation Home PT   Equipment Recommended   (pt has a walker, cane, 02)   Licensure   [place] License Number  Fawn Crigler PT [id number]

## 2019-07-01 NOTE — ASSESSMENT & PLAN NOTE
  Patient patient presented with exertional shortness of breath, fatigue, chest pain and palpitations   Patient's heart rate was uncontrolled in the ED   Patient was recently taken off Cardizem due to cardiomyopathy by his primary cardiologist    Patient is being treated with up titrating beta-blockers   Patient's heart rate is better controlled with metoprolol 100 milligram p o  T i d    Patient is on anticoagulation with Eliquis

## 2019-07-01 NOTE — SOCIAL WORK
Therapy is recommending home PT/OT for pt  CM met with pt, explained therapy recommendation, referral process and provided [facility] choices to pt  Pt states he was open to [facility] & chooses to use them  Referral sent in [employer] and their contact information placed on pts discharge instructions  CM informed Dr Avtar Monsalve of same  Per Dr Avtar Monsalve pt is for discharge today

## 2019-07-01 NOTE — DISCHARGE SUMMARY
Discharge- Liv Adam 1960, 60 y o  male MRN: [id number]    Unit/Bed#: [address] [address] Encounter: [id number]    Primary Care Provider: Mariajose Templeton MD   Date and time admitted to hospital: 6/28/2019  1:11 PM        * Atrial fibrillation with RVR [facility])  Assessment & Plan    Patient patient presented with exertional shortness of breath, fatigue, chest pain and palpitations   Patient's heart rate was uncontrolled in the ED   Patient was recently taken off Cardizem due to cardiomyopathy by his primary cardiologist    Patient is being treated with up titrating beta-blockers   Patient's heart rate is better controlled with metoprolol 100 milligram p o  T i d    Patient is on anticoagulation with Eliquis  SHAVONNE and COPD overlap syndrome (HCC)  Assessment & Plan  Continue bipap q h s    No evidence of COPD exacerbation  Continue Breo Ellipta and nebulizers    Type 2 diabetes mellitus with complication, with long-term current use of insulin [facility])  Assessment & Plan  Patient Hga1c 8 3 on 4/19  Continue home medications with dapagliflozin, lantus, metformin, victoza  Continue home novolog coverage and add sliding scale    Chronic respiratory failure with hypoxia (HCC)  Assessment & Plan  Pulse ox stable  Continue 2 liters of oxygen continuously which he uses at home  Cpap at night    Benign essential hypertension  Assessment & Plan  Continue losartan and metoprolol    CAD (coronary artery disease)  Assessment & Plan  Chest pain atypical -serial troponins were negative  Continue aspirin, statin, metoprolol  Patient recently had a nuclear stress test which was unremarkable about a month ago by his primary cardiologist     Bipolar affective disorder [facility])  Assessment & Plan  Continue buspar, [place] Cancel        Discharging Physician / Practitioner: Sean Acosta MD  PCP: Mariajose Templeton MD  Admission Date: 6/28/2019  Discharge Date: 07/01/19    Reason for Admission: Chest Pain (Patient states he was walking in the heat and humidity to get lunch, wearing O2 at 2 LNC   Started with chest pain and difficulty breathing  States he thinks his A-fib is acting up  He is compliant with his meds  FSBS 201 on arrival ) and Shortness of Breath        Resolved Problems  Date Reviewed: 7/1/2019          Resolved    Chronic respiratory failure ([facility] [address] ) 6/28/2019     Resolved by  Candance Quarry, CRNP    Overview Signed 11/16/2018  1:56 AM by Telma Jiang to wean from BiPAP to home CPAP and in the morning transitioned to the regular nasal cannula use at 2 L               Consultations During Hospital Stay:  [address] TO CASE MANAGEMENT    Significant Findings / Test Results:     ·   Results from last 7 days   Lab Units 06/29/19  0635 06/28/19  1344   WBC Thousand/uL 7 22 10 22*   HEMOGLOBIN g/dL 15 1 16 8   PLATELETS Thousands/uL 143* 185     Results from last 7 days   Lab Units 07/01/19  0527 06/29/19  0445 06/28/19  1344   SODIUM mmol/L 139 138 139   POTASSIUM mmol/L 3 7 4 0 4 3   CHLORIDE mmol/L 101 104 101   CO2 mmol/L 29 26 24   BUN mg/dL 19 21 15   CREATININE mg/dL 1 07 1 09 1 32*   CALCIUM mg/dL 9 3 8 7 9 4   TOTAL BILIRUBIN mg/dL  --   --  0 90   ALK PHOS U/L  --   --  51   ALT U/L  --   --  40   AST U/L  --   --  25         Results from last 7 days   Lab Units 06/28/19  2043 06/28/19  1632 06/28/19  1344   TROPONIN I ng/mL 0 02 <0 02 <0 02     Lab Results   Component Value Date/Time    HGBA1C 8 3 (H) 04/19/2019 10:19 AM    HGBA1C 8 0 07/21/2017 09:27 AM     Results from last 7 days   Lab Units 07/01/19  1118 07/01/19  0823 06/30/19 2018 06/30/19  1621 06/30/19  1104 06/30/19  0709 06/30/19  0158 06/29/19  2036 06/29/19  1604 06/29/19  1119 06/29/19  0712 06/28/19  2111   POC GLUCOSE mg/dl 216* 131 196* 116 230* 167* 119 101 118 231* 171* 272*         Blood Culture:   Lab Results   Component Value Date    BLOODCX No Growth After 5 Days   11/16/2018    BLOODCX Staphylococcus coagulase negative (A) 11/16/2018    BLOODCX No Growth After 5 Days  10/31/2018    BLOODCX No Growth After 5 Days  10/31/2018    BLOODCX No Growth After 5 Days  09/06/2017    BLOODCX No Growth After 5 Days  09/06/2017     Urine Culture:   Lab Results   Component Value Date    URINECX 3876-7274 cfu/ml Mixed Contaminants X2 03/20/2017    URINECX No Growth <1000 cfu/mL 11/27/2016    URINECX No Growth <1000 cfu/mL 09/02/2016     Sputum Culture: No components found for: SPUTUMCX  Wound Culture: No results found for: WOUNDCULT     XR chest 1 view portable   Final Result by Kayla Sanchez MD (06/28 6851)   Stable exam    No acute cardiopulmonary disease  Workstation performed: [id number]           Outpatient Tests Requested:  · Follow up outpatient with primary cardiology    Complications:  None    Reason for Admission:   Chief Complaint   Patient presents with    Chest Pain     Patient states he was walking in the heat and humidity to get lunch, wearing O2 at 2 LNC   Started with chest pain and difficulty breathing  States he thinks his A-fib is acting up  He is compliant with his meds  FSBS 201 on arrival     Shortness of Breath       Hospital Course:     Tamanna Bermudez is a 60 y o  male patient with a PMH of COPD, diabetes, bipolar disorder, obstructive sleep apnea, chronic respiratory failure who originally presented to the hospital on 6/28/2019 due to fatigue, shortness of breath and some chest pain  In the ED patient was noted to have atrial fibrillation with rapid ventricular rate  Patient was admitted hospital and patient's beta-blockers were up titrated with better heart rate control  Patient was seen in consultation with Cardiology  Patient had serial cardiac enzymes which were negative    Patient remained stable with controlled heart rate and patient was discharged home to follow up with primary cardiologist     Please see above list of diagnoses and related plan for additional information  Condition at Discharge: stable       Discharge Day Visit / Exam:     Subjective:  Patient denies any chest pain, shortness of breath, abdominal pain, nausea or vomiting  Vitals: Blood Pressure: 123/78 (07/01/19 0819)  Pulse: 88 (07/01/19 0819)  Temperature: 98 1 °F (36 7 °C) (07/01/19 0819)  Temp Source: Oral (07/01/19 0819)  Respirations: 18 (07/01/19 0819)  Height: 5' 11" (180 3 cm) (06/28/19 1930)  Weight - Scale: 127 kg (279 lb 8 7 oz) (07/01/19 0600)  SpO2: 95 % (07/01/19 1334)  Exam:   Physical Exam   Constitutional: No distress  HENT:   Head: Normocephalic and atraumatic  Eyes: Pupils are equal, round, and reactive to light  Conjunctivae are normal    Neck: Normal range of motion  Neck supple  Cardiovascular: Normal rate and normal heart sounds  Irregularly irregular   Pulmonary/Chest: Effort normal  No respiratory distress  He has no wheezes  He has no rhonchi  He has no rales  He exhibits no tenderness  Abdominal: Soft  Bowel sounds are normal  He exhibits no distension  There is no tenderness  There is no rebound and no guarding  Musculoskeletal: He exhibits no edema  Neurological: He is alert  No cranial nerve deficit  Skin: Skin is warm and dry  No rash noted  Discharge instructions/Information to patient and family:   See after visit summary for information provided to patient and family  Provisions for Follow-Up Care:  See after visit summary for information related to follow-up care and any pertinent home health orders  Disposition:     Home with [facility] Services (Reminder: Complete face to face encounter)    Planned Readmission: No     Discharge Statement:  I spent 35 minutes discharging the patient  This time was spent on the day of discharge  I had direct contact with the patient on the day of discharge   Greater than 50% of the total time was spent examining patient, answering all patient questions, arranging and discussing plan of care with patient as well as directly providing post-discharge instructions  Additional time then spent on discharge activities  Discharge Medications:  See after visit summary for reconciled discharge medications provided to patient and family        ** Please Note: This note has been constructed using a voice recognition system **

## 2019-07-02 ENCOUNTER — TELEPHONE (OUTPATIENT)
Dept: FAMILY MEDICINE CLINIC | Facility: CLINIC | Age: 59
End: 2019-07-02

## 2019-07-08 DIAGNOSIS — Z71.89 COMPLEX CARE COORDINATION: Primary | ICD-10-CM

## 2019-07-09 ENCOUNTER — PATIENT OUTREACH (OUTPATIENT)
Dept: FAMILY MEDICINE CLINIC | Facility: CLINIC | Age: 59
End: 2019-07-09

## 2019-07-09 NOTE — PROGRESS NOTES
Elda Stroud is 59 yo male, disabled  He shares an apartment with roommate  He does not drive  He states he is unable to work related to debilitating fatigue, his  #1 problem  Hx of uncontrolled DM II since 2005, COPD since 2001, heart disease  Has had back surgeries, states he has back pain level 6, uses Tylenol  Elda Stroud has a [place] continuous monitor  Does not have printer ink  Have gotten his agreement to go to endocrine office with [facility] and print out glucose log in office  Have gotten his agreement to contact Weight Management as well  Elda Stroud has history of depression with suicidal ideation  No thoughts of suicide today  He has Family Guidance counselor with appointments every 3 weeks and psychiatrist every 6 weeks  He does no purposeful movement/exercise  Has current PT/OT? RN services from [place] [facility]  He may be agreeable to MNT services  To see PCP next week  Alonzo uses O2, 2L when needed  He has spoken with me for 35 minutes and, when asked, states he feels slightly short of breath  We agree to continue next week

## 2019-07-11 VITALS
RESPIRATION RATE: 20 BRPM | WEIGHT: 286.4 LBS | HEART RATE: 78 BPM | TEMPERATURE: 97.5 F | BODY MASS INDEX: 40.1 KG/M2 | HEIGHT: 71 IN | OXYGEN SATURATION: 97 % | SYSTOLIC BLOOD PRESSURE: 110 MMHG | DIASTOLIC BLOOD PRESSURE: 82 MMHG
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
## 2019-07-12 ENCOUNTER — TELEPHONE (OUTPATIENT)
Dept: FAMILY MEDICINE CLINIC | Facility: CLINIC | Age: 59
End: 2019-07-12

## 2019-07-12 ENCOUNTER — OFFICE VISIT (OUTPATIENT)
Dept: FAMILY MEDICINE CLINIC | Facility: CLINIC | Age: 59
End: 2019-07-12

## 2019-07-12 VITALS
RESPIRATION RATE: 18 BRPM | SYSTOLIC BLOOD PRESSURE: 120 MMHG | WEIGHT: 299 LBS | TEMPERATURE: 98.6 F | DIASTOLIC BLOOD PRESSURE: 80 MMHG | BODY MASS INDEX: 41.86 KG/M2 | HEIGHT: 71 IN | HEART RATE: 100 BPM

## 2019-07-12 DIAGNOSIS — J44.9 OSA AND COPD OVERLAP SYNDROME (HCC): ICD-10-CM

## 2019-07-12 DIAGNOSIS — Z09 HOSPITAL DISCHARGE FOLLOW-UP: ICD-10-CM

## 2019-07-12 DIAGNOSIS — E66.2 CLASS 3 OBESITY WITH ALVEOLAR HYPOVENTILATION, SERIOUS COMORBIDITY, AND BODY MASS INDEX (BMI) OF 40.0 TO 44.9 IN ADULT (HCC): ICD-10-CM

## 2019-07-12 DIAGNOSIS — Z53.21 PATIENT LEFT WITHOUT BEING SEEN: Primary | ICD-10-CM

## 2019-07-12 DIAGNOSIS — I48.91 ATRIAL FIBRILLATION WITH RVR (HCC): ICD-10-CM

## 2019-07-12 DIAGNOSIS — R26.2 DIFFICULTY IN WALKING: ICD-10-CM

## 2019-07-12 DIAGNOSIS — G47.33 OSA AND COPD OVERLAP SYNDROME (HCC): ICD-10-CM

## 2019-07-12 PROCEDURE — 99499 UNLISTED E&M SERVICE: CPT | Performed by: NURSE PRACTITIONER

## 2019-07-12 RX ORDER — ALPRAZOLAM 2 MG/1
2 TABLET ORAL
Status: ON HOLD | COMMUNITY
End: 2022-04-21 | Stop reason: SDUPTHER

## 2019-07-12 NOTE — TELEPHONE ENCOUNTER
60year old male with complex medical conditions present for hospital follow up> This patient is always seen by Dr Julius Longoria  He was admitted to [facility] 6/28-7/1 for Afib, end stage copd exac  Chief c/o fatigued, short of breath  His cardiologist, Dr Agustin Madison adjusted meds and stabilized him  Upon entering my the room, he advised me that he was here for me to complete mobility exam and required documentation for his replacement Mobility device (electric scooter)  I advised that CMS guidelines require a comprehensive evaluation showing necessity  I am not trained in administering this exam therefore am not comfortable with the saem  I discussed ric with my collaborative physician who agrees that it appropriate to refer to physical therapy/occupational therapy for same  Patient became belligerent and stated "This is fucking absurd, you just need to do it"    I again politely cited reason that I can not perform the same  He abruptly stood up and stated "I'm uncomfortable and done  I'm leaving"  I advised that he is here for a discharge follow up  He does not see his cardiologist for another 2 weeks  His follow up with his pulm was canceled by him  I asked if there is anything that we can address r/t his hospital stay, medical condition  Patient ignored my query and left the room
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
## 2019-07-17 ENCOUNTER — PATIENT OUTREACH (OUTPATIENT)
Dept: FAMILY MEDICINE CLINIC | Facility: CLINIC | Age: 59
End: 2019-07-17

## 2019-07-17 NOTE — PROGRESS NOTES
Andrew Coleman is in a car with friend when I call  He has just left cardiology office of Dr Ling Muñoz  Medication changes were made which I will have to get with next outreach  Alonzo purdy new PCP  I provided numbers for [facility] and [facility]  Andrew Coleman has made weight management and endocrine appointments  He will go to endo office tomorrow, after weight management appointment, to see if they will print glucose log

## 2019-07-24 ENCOUNTER — TELEPHONE (OUTPATIENT)
Dept: FAMILY MEDICINE CLINIC | Facility: CLINIC | Age: 59
End: 2019-07-24

## 2019-07-24 NOTE — TELEPHONE ENCOUNTER
Tuesday called from [address] [facility] her cell is [phone] said yahir's heart rate was 100, /90 -asymptomatic ,she would like to start him on a telehealth monitor   Please call her if this is ok to do tc/cma

## 2019-07-25 ENCOUNTER — OFFICE VISIT (OUTPATIENT)
Dept: BARIATRICS | Facility: CLINIC | Age: 59
End: 2019-07-25
Payer: MEDICARE

## 2019-07-25 ENCOUNTER — TELEPHONE (OUTPATIENT)
Dept: ENDOCRINOLOGY | Facility: CLINIC | Age: 59
End: 2019-07-25

## 2019-07-25 VITALS
DIASTOLIC BLOOD PRESSURE: 80 MMHG | BODY MASS INDEX: 40.77 KG/M2 | RESPIRATION RATE: 18 BRPM | HEIGHT: 70 IN | WEIGHT: 284.8 LBS | HEART RATE: 96 BPM | SYSTOLIC BLOOD PRESSURE: 124 MMHG | TEMPERATURE: 96.7 F

## 2019-07-25 DIAGNOSIS — I48.91 ATRIAL FIBRILLATION WITH RVR (HCC): ICD-10-CM

## 2019-07-25 DIAGNOSIS — E11.8 TYPE 2 DIABETES MELLITUS WITH COMPLICATION, WITH LONG-TERM CURRENT USE OF INSULIN (HCC): ICD-10-CM

## 2019-07-25 DIAGNOSIS — J44.9 OSA AND COPD OVERLAP SYNDROME (HCC): ICD-10-CM

## 2019-07-25 DIAGNOSIS — E66.01 MORBID OBESITY (HCC): Primary | ICD-10-CM

## 2019-07-25 DIAGNOSIS — I10 BENIGN ESSENTIAL HYPERTENSION: ICD-10-CM

## 2019-07-25 DIAGNOSIS — G47.33 OSA AND COPD OVERLAP SYNDROME (HCC): ICD-10-CM

## 2019-07-25 DIAGNOSIS — F31.9 BIPOLAR AFFECTIVE DISORDER, REMISSION STATUS UNSPECIFIED (HCC): ICD-10-CM

## 2019-07-25 DIAGNOSIS — E11.65 UNCONTROLLED TYPE 2 DIABETES MELLITUS WITH HYPERGLYCEMIA (HCC): ICD-10-CM

## 2019-07-25 DIAGNOSIS — Z79.4 TYPE 2 DIABETES MELLITUS WITH COMPLICATION, WITH LONG-TERM CURRENT USE OF INSULIN (HCC): ICD-10-CM

## 2019-07-25 PROCEDURE — 99203 OFFICE O/P NEW LOW 30 MIN: CPT | Performed by: PHYSICIAN ASSISTANT

## 2019-07-25 RX ORDER — METOPROLOL SUCCINATE 200 MG/1
200 TABLET, EXTENDED RELEASE ORAL DAILY
Refills: 1 | COMMUNITY
Start: 2019-07-17 | End: 2020-06-01 | Stop reason: SDUPTHER

## 2019-07-25 NOTE — TELEPHONE ENCOUNTER
Spoke with patient and reviewed all changes to his insulin  He wrote it down and said it back to me so I was sure he understood

## 2019-07-25 NOTE — ASSESSMENT & PLAN NOTE
-Discussed options of [facility]-Intensive Lifestyle Intervention Program, Very Low Calorie Diet-VLCD and Conservative Program and the role of weight loss medications   Bariatric surgery options were not discussed as patient would have to wait 2 years to begin surgical process due to recent inpatient psych admission   -Initial weight loss goal of 5-10% weight loss for improved health  -Screening labs: reviewed labs  -Patient is interested in pursuing conservative program

## 2019-07-25 NOTE — PATIENT INSTRUCTIONS
Goals: Food log (ie ) www myfitnesspal com,sparkpeople  com,loseit com,calorieking  com,etc    No sugary beverages  At least 64oz of water daily  Increase physical activity by 10 minutes daily   Gradually increase physical activity to a goal of 5 days per week for 30 minutes of MODERATE intensity PLUS 2 days per week of FULL BODY resistance training  [id number] calories per day  5-10 servings of fruits and vegetables per day   Increase to 40oz water per day   Monitor blood sugars- If  consistnetly 100 or lower please notify office or endocrinologist
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
## 2019-07-25 NOTE — ASSESSMENT & PLAN NOTE
Lab Results   Component Value Date    HGBA1C 8 3 (H) 04/19/2019     -managed by endocrine  -consuming excess refined carbs  -On Basaglar, Humalog, metformin Victoza and Farxiga  -continue to monitor BS with dietary modification  -patient reports he does not feel well when BS falls below 100
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
## 2019-07-26 ENCOUNTER — TELEPHONE (OUTPATIENT)
Dept: ENDOCRINOLOGY | Facility: CLINIC | Age: 59
End: 2019-07-26

## 2019-07-26 DIAGNOSIS — E11.65 UNCONTROLLED TYPE 2 DIABETES MELLITUS WITH HYPERGLYCEMIA (HCC): ICD-10-CM

## 2019-07-26 NOTE — TELEPHONE ENCOUNTER
Per Doctor Nusrat Etienne changed Novolog to 20 units with breakfast, 17 units with lunch and 22 units with dinner

## 2019-08-01 ENCOUNTER — PATIENT OUTREACH (OUTPATIENT)
Dept: FAMILY MEDICINE CLINIC | Facility: CLINIC | Age: 59
End: 2019-08-01

## 2019-08-01 NOTE — PROGRESS NOTES
Alonzo answers the phone sounding much more positive  He is traveling on the bus and wishes to speak tomorrow morning

## 2019-08-02 ENCOUNTER — PATIENT OUTREACH (OUTPATIENT)
Dept: FAMILY MEDICINE CLINIC | Facility: CLINIC | Age: 59
End: 2019-08-02

## 2019-08-02 NOTE — PROGRESS NOTES
Diona Snellen has provided glucose levels to endocrine  He is attending [employer], 5 days/week,  with individual and group sessions  Goal is to "get me off the couch " Alonzo is feeling better since he has a purpose to his days  [place] goals are to increase strength/ stamina and be more productive  He has attended bariatric and endo appointments  He refuses surgical weight loss surgery  He claims to see cardiology every 6 weeks  He attends psychiatry appointments through [facility] guidance  May be getting new housing through [facility]  Diona Snellen states that performing ADL's is "too hard" for him  He has my number and knows he may call any time

## 2019-08-05 ENCOUNTER — TELEPHONE (OUTPATIENT)
Dept: FAMILY MEDICINE CLINIC | Facility: CLINIC | Age: 59
End: 2019-08-05

## 2019-08-05 NOTE — TELEPHONE ENCOUNTER
Tuesday from [facility] called to say Alonzo has SOB, wheezing , /100, o2-97 , very clammy , and forgetful with some confusion  He is currently on oxygen   I spoke to Dr Cameron Murguia about this   I offered Alonzo an appointment but he needs 2 days notice for transportation, so I advised him to go to call the squad if he started to feel worse, pt agreed    tc/cma

## 2019-08-06 ENCOUNTER — APPOINTMENT (EMERGENCY)
Dept: RADIOLOGY | Facility: HOSPITAL | Age: 59
DRG: 871 | End: 2019-08-06
Payer: MEDICARE

## 2019-08-06 ENCOUNTER — HOSPITAL ENCOUNTER (INPATIENT)
Facility: HOSPITAL | Age: 59
LOS: 3 days | Discharge: NURSING, REHAB OR LONG-TERM CARE | DRG: 871 | End: 2019-08-10
Attending: EMERGENCY MEDICINE | Admitting: INTERNAL MEDICINE
Payer: MEDICARE

## 2019-08-06 ENCOUNTER — TELEPHONE (OUTPATIENT)
Dept: OTHER | Facility: OTHER | Age: 59
End: 2019-08-06

## 2019-08-06 DIAGNOSIS — J18.9 PNEUMONIA: Primary | ICD-10-CM

## 2019-08-06 DIAGNOSIS — Z79.4 TYPE 2 DIABETES MELLITUS WITH COMPLICATION, WITH LONG-TERM CURRENT USE OF INSULIN (HCC): ICD-10-CM

## 2019-08-06 DIAGNOSIS — I48.91 ATRIAL FIBRILLATION WITH RVR (HCC): ICD-10-CM

## 2019-08-06 DIAGNOSIS — R65.20 SEVERE SEPSIS (HCC): ICD-10-CM

## 2019-08-06 DIAGNOSIS — A41.9 SEVERE SEPSIS (HCC): ICD-10-CM

## 2019-08-06 DIAGNOSIS — I48.91 ATRIAL FIBRILLATION WITH RAPID VENTRICULAR RESPONSE (HCC): ICD-10-CM

## 2019-08-06 DIAGNOSIS — J20.9 ACUTE BRONCHITIS: ICD-10-CM

## 2019-08-06 DIAGNOSIS — E11.8 TYPE 2 DIABETES MELLITUS WITH COMPLICATION, WITH LONG-TERM CURRENT USE OF INSULIN (HCC): ICD-10-CM

## 2019-08-06 LAB
ALBUMIN SERPL BCP-MCNC: 3.8 G/DL (ref 3.5–5)
ALP SERPL-CCNC: 84 U/L (ref 46–116)
ALT SERPL W P-5'-P-CCNC: 40 U/L (ref 12–78)
ANION GAP SERPL CALCULATED.3IONS-SCNC: 15 MMOL/L (ref 4–13)
AST SERPL W P-5'-P-CCNC: 17 U/L (ref 5–45)
BILIRUB SERPL-MCNC: 0.3 MG/DL (ref 0.2–1)
BUN SERPL-MCNC: 39 MG/DL (ref 5–25)
CALCIUM SERPL-MCNC: 10.7 MG/DL (ref 8.3–10.1)
CHLORIDE SERPL-SCNC: 96 MMOL/L (ref 100–108)
CO2 SERPL-SCNC: 21 MMOL/L (ref 21–32)
CREAT SERPL-MCNC: 1.51 MG/DL (ref 0.6–1.3)
GFR SERPL CREATININE-BSD FRML MDRD: 50 ML/MIN/1.73SQ M
GLUCOSE SERPL-MCNC: 309 MG/DL (ref 65–140)
MAGNESIUM SERPL-MCNC: 2 MG/DL (ref 1.6–2.6)
NT-PROBNP SERPL-MCNC: 323 PG/ML
POTASSIUM SERPL-SCNC: 4.9 MMOL/L (ref 3.5–5.3)
PROT SERPL-MCNC: 7.3 G/DL (ref 6.4–8.2)
SODIUM SERPL-SCNC: 132 MMOL/L (ref 136–145)

## 2019-08-06 PROCEDURE — 93005 ELECTROCARDIOGRAM TRACING: CPT

## 2019-08-06 PROCEDURE — 71045 X-RAY EXAM CHEST 1 VIEW: CPT

## 2019-08-06 PROCEDURE — 85610 PROTHROMBIN TIME: CPT | Performed by: EMERGENCY MEDICINE

## 2019-08-06 PROCEDURE — 84484 ASSAY OF TROPONIN QUANT: CPT | Performed by: EMERGENCY MEDICINE

## 2019-08-06 PROCEDURE — 80053 COMPREHEN METABOLIC PANEL: CPT | Performed by: EMERGENCY MEDICINE

## 2019-08-06 PROCEDURE — 83735 ASSAY OF MAGNESIUM: CPT | Performed by: EMERGENCY MEDICINE

## 2019-08-06 PROCEDURE — 96374 THER/PROPH/DIAG INJ IV PUSH: CPT

## 2019-08-06 PROCEDURE — 85007 BL SMEAR W/DIFF WBC COUNT: CPT | Performed by: EMERGENCY MEDICINE

## 2019-08-06 PROCEDURE — 36415 COLL VENOUS BLD VENIPUNCTURE: CPT | Performed by: EMERGENCY MEDICINE

## 2019-08-06 PROCEDURE — 83605 ASSAY OF LACTIC ACID: CPT | Performed by: EMERGENCY MEDICINE

## 2019-08-06 PROCEDURE — 87040 BLOOD CULTURE FOR BACTERIA: CPT | Performed by: EMERGENCY MEDICINE

## 2019-08-06 PROCEDURE — 85027 COMPLETE CBC AUTOMATED: CPT | Performed by: EMERGENCY MEDICINE

## 2019-08-06 PROCEDURE — 85730 THROMBOPLASTIN TIME PARTIAL: CPT | Performed by: EMERGENCY MEDICINE

## 2019-08-06 PROCEDURE — 99285 EMERGENCY DEPT VISIT HI MDM: CPT

## 2019-08-06 PROCEDURE — 83880 ASSAY OF NATRIURETIC PEPTIDE: CPT | Performed by: EMERGENCY MEDICINE

## 2019-08-06 RX ORDER — METOPROLOL TARTRATE 5 MG/5ML
5 INJECTION INTRAVENOUS ONCE
Status: COMPLETED | OUTPATIENT
Start: 2019-08-06 | End: 2019-08-06

## 2019-08-06 RX ORDER — DILTIAZEM HYDROCHLORIDE 5 MG/ML
20 INJECTION INTRAVENOUS ONCE
Status: COMPLETED | OUTPATIENT
Start: 2019-08-06 | End: 2019-08-07

## 2019-08-06 RX ADMIN — METOPROLOL TARTRATE 5 MG: 5 INJECTION, SOLUTION INTRAVENOUS at 23:57

## 2019-08-07 ENCOUNTER — APPOINTMENT (INPATIENT)
Dept: RADIOLOGY | Facility: HOSPITAL | Age: 59
DRG: 871 | End: 2019-08-07
Payer: MEDICARE

## 2019-08-07 ENCOUNTER — APPOINTMENT (INPATIENT)
Dept: NON INVASIVE DIAGNOSTICS | Facility: HOSPITAL | Age: 59
DRG: 871 | End: 2019-08-07
Payer: MEDICARE

## 2019-08-07 PROBLEM — A41.9 SEPSIS DUE TO PNEUMONIA (HCC): Status: ACTIVE | Noted: 2017-03-20

## 2019-08-07 PROBLEM — J96.21 ACUTE ON CHRONIC RESPIRATORY FAILURE WITH HYPOXIA (HCC): Status: ACTIVE | Noted: 2018-11-21

## 2019-08-07 LAB
ALBUMIN SERPL BCP-MCNC: 3.4 G/DL (ref 3.5–5)
ALP SERPL-CCNC: 65 U/L (ref 46–116)
ALT SERPL W P-5'-P-CCNC: 35 U/L (ref 12–78)
ANION GAP SERPL CALCULATED.3IONS-SCNC: 18 MMOL/L (ref 4–13)
APTT PPP: 24 SECONDS (ref 24–33)
AST SERPL W P-5'-P-CCNC: 16 U/L (ref 5–45)
ATRIAL RATE: 107 BPM
BASOPHILS # BLD MANUAL: 0 THOUSAND/UL (ref 0–0.1)
BASOPHILS NFR MAR MANUAL: 0 % (ref 0–1)
BETA-HYDROXYBUTYRATE: 0.1 MMOL/L
BILIRUB SERPL-MCNC: 0.3 MG/DL (ref 0.2–1)
BUN SERPL-MCNC: 31 MG/DL (ref 5–25)
CALCIUM SERPL-MCNC: 9.2 MG/DL (ref 8.3–10.1)
CHLORIDE SERPL-SCNC: 98 MMOL/L (ref 100–108)
CO2 SERPL-SCNC: 17 MMOL/L (ref 21–32)
CREAT SERPL-MCNC: 0.94 MG/DL (ref 0.6–1.3)
EOSINOPHIL # BLD MANUAL: 0.14 THOUSAND/UL (ref 0–0.4)
EOSINOPHIL NFR BLD MANUAL: 1 % (ref 0–6)
ERYTHROCYTE [DISTWIDTH] IN BLOOD BY AUTOMATED COUNT: 13 % (ref 11.6–15.1)
ERYTHROCYTE [DISTWIDTH] IN BLOOD BY AUTOMATED COUNT: 13.2 % (ref 11.6–15.1)
GFR SERPL CREATININE-BSD FRML MDRD: 88 ML/MIN/1.73SQ M
GLUCOSE SERPL-MCNC: 151 MG/DL (ref 65–140)
GLUCOSE SERPL-MCNC: 173 MG/DL (ref 65–140)
GLUCOSE SERPL-MCNC: 208 MG/DL (ref 65–140)
GLUCOSE SERPL-MCNC: 216 MG/DL (ref 65–140)
GLUCOSE SERPL-MCNC: 231 MG/DL (ref 65–140)
HCT VFR BLD AUTO: 38.6 % (ref 36.5–49.3)
HCT VFR BLD AUTO: 49.3 % (ref 36.5–49.3)
HGB BLD-MCNC: 14.1 G/DL (ref 12–17)
HGB BLD-MCNC: 17.6 G/DL (ref 12–17)
INR PPP: 0.92 (ref 0.86–1.16)
L PNEUMO1 AG UR QL IA.RAPID: NEGATIVE
LACTATE SERPL-SCNC: 1 MMOL/L (ref 0.5–2)
LACTATE SERPL-SCNC: 2.8 MMOL/L (ref 0.5–2)
LYMPHOCYTES # BLD AUTO: 53 % (ref 14–44)
LYMPHOCYTES # BLD AUTO: 7.63 THOUSAND/UL (ref 0.6–4.47)
MCH RBC QN AUTO: 32.4 PG (ref 26.8–34.3)
MCH RBC QN AUTO: 32.9 PG (ref 26.8–34.3)
MCHC RBC AUTO-ENTMCNC: 35.7 G/DL (ref 31.4–37.4)
MCHC RBC AUTO-ENTMCNC: 36.5 G/DL (ref 31.4–37.4)
MCV RBC AUTO: 90 FL (ref 82–98)
MCV RBC AUTO: 91 FL (ref 82–98)
MONOCYTES # BLD AUTO: 0.86 THOUSAND/UL (ref 0–1.22)
MONOCYTES NFR BLD: 6 % (ref 4–12)
NEUTROPHILS # BLD MANUAL: 5.76 THOUSAND/UL (ref 1.85–7.62)
NEUTS BAND NFR BLD MANUAL: 5 % (ref 0–8)
NEUTS SEG NFR BLD AUTO: 35 % (ref 43–75)
NRBC BLD AUTO-RTO: 1 /100 WBCS
PLATELET # BLD AUTO: 218 THOUSANDS/UL (ref 149–390)
PLATELET # BLD AUTO: 295 THOUSANDS/UL (ref 149–390)
PLATELET BLD QL SMEAR: ADEQUATE
PMV BLD AUTO: 10.5 FL (ref 8.9–12.7)
PMV BLD AUTO: 10.5 FL (ref 8.9–12.7)
POTASSIUM SERPL-SCNC: 4.2 MMOL/L (ref 3.5–5.3)
PROCALCITONIN SERPL-MCNC: 0.07 NG/ML
PROT SERPL-MCNC: 6.4 G/DL (ref 6.4–8.2)
PROTHROMBIN TIME: 9.7 SECONDS (ref 9.4–11.7)
QRS AXIS: 74 DEGREES
QRSD INTERVAL: 86 MS
QT INTERVAL: 310 MS
QTC INTERVAL: 471 MS
RBC # BLD AUTO: 4.28 MILLION/UL (ref 3.88–5.62)
RBC # BLD AUTO: 5.44 MILLION/UL (ref 3.88–5.62)
RBC MORPH BLD: PRESENT
ROULEAUX BLD QL SMEAR: PRESENT
S PNEUM AG UR QL: NEGATIVE
SODIUM SERPL-SCNC: 133 MMOL/L (ref 136–145)
T WAVE AXIS: 5 DEGREES
TOTAL CELLS COUNTED SPEC: 100
TROPONIN I SERPL-MCNC: <0.02 NG/ML
TSH SERPL DL<=0.05 MIU/L-ACNC: 0.92 UIU/ML (ref 0.36–3.74)
VENTRICULAR RATE: 139 BPM
WBC # BLD AUTO: 14.4 THOUSAND/UL (ref 4.31–10.16)
WBC # BLD AUTO: 8.72 THOUSAND/UL (ref 4.31–10.16)

## 2019-08-07 PROCEDURE — 87449 NOS EACH ORGANISM AG IA: CPT | Performed by: NURSE PRACTITIONER

## 2019-08-07 PROCEDURE — 93799 UNLISTED CV SVC/PROCEDURE: CPT

## 2019-08-07 PROCEDURE — 94664 DEMO&/EVAL PT USE INHALER: CPT

## 2019-08-07 PROCEDURE — 97163 PT EVAL HIGH COMPLEX 45 MIN: CPT

## 2019-08-07 PROCEDURE — 97167 OT EVAL HIGH COMPLEX 60 MIN: CPT

## 2019-08-07 PROCEDURE — 85027 COMPLETE CBC AUTOMATED: CPT | Performed by: NURSE PRACTITIONER

## 2019-08-07 PROCEDURE — 93306 TTE W/DOPPLER COMPLETE: CPT

## 2019-08-07 PROCEDURE — G8979 MOBILITY GOAL STATUS: HCPCS

## 2019-08-07 PROCEDURE — 93010 ELECTROCARDIOGRAM REPORT: CPT | Performed by: INTERNAL MEDICINE

## 2019-08-07 PROCEDURE — 80053 COMPREHEN METABOLIC PANEL: CPT | Performed by: NURSE PRACTITIONER

## 2019-08-07 PROCEDURE — 71250 CT THORAX DX C-: CPT

## 2019-08-07 PROCEDURE — 99222 1ST HOSP IP/OBS MODERATE 55: CPT | Performed by: INTERNAL MEDICINE

## 2019-08-07 PROCEDURE — G8988 SELF CARE GOAL STATUS: HCPCS

## 2019-08-07 PROCEDURE — 84443 ASSAY THYROID STIM HORMONE: CPT | Performed by: NURSE PRACTITIONER

## 2019-08-07 PROCEDURE — 99223 1ST HOSP IP/OBS HIGH 75: CPT | Performed by: INTERNAL MEDICINE

## 2019-08-07 PROCEDURE — 36415 COLL VENOUS BLD VENIPUNCTURE: CPT

## 2019-08-07 PROCEDURE — 82010 KETONE BODYS QUAN: CPT | Performed by: INTERNAL MEDICINE

## 2019-08-07 PROCEDURE — 93306 TTE W/DOPPLER COMPLETE: CPT | Performed by: INTERNAL MEDICINE

## 2019-08-07 PROCEDURE — 94760 N-INVAS EAR/PLS OXIMETRY 1: CPT

## 2019-08-07 PROCEDURE — 96375 TX/PRO/DX INJ NEW DRUG ADDON: CPT

## 2019-08-07 PROCEDURE — 83605 ASSAY OF LACTIC ACID: CPT

## 2019-08-07 PROCEDURE — G8987 SELF CARE CURRENT STATUS: HCPCS

## 2019-08-07 PROCEDURE — 84145 PROCALCITONIN (PCT): CPT | Performed by: NURSE PRACTITIONER

## 2019-08-07 PROCEDURE — 94640 AIRWAY INHALATION TREATMENT: CPT

## 2019-08-07 PROCEDURE — G8978 MOBILITY CURRENT STATUS: HCPCS

## 2019-08-07 PROCEDURE — 94660 CPAP INITIATION&MGMT: CPT

## 2019-08-07 PROCEDURE — 82948 REAGENT STRIP/BLOOD GLUCOSE: CPT

## 2019-08-07 PROCEDURE — 87081 CULTURE SCREEN ONLY: CPT | Performed by: NURSE PRACTITIONER

## 2019-08-07 RX ORDER — DIGOXIN 0.25 MG/ML
125 INJECTION INTRAMUSCULAR; INTRAVENOUS ONCE
Status: DISCONTINUED | OUTPATIENT
Start: 2019-08-08 | End: 2019-08-07

## 2019-08-07 RX ORDER — QUETIAPINE 200 MG/1
400 TABLET, FILM COATED, EXTENDED RELEASE ORAL
Status: DISCONTINUED | OUTPATIENT
Start: 2019-08-07 | End: 2019-08-10 | Stop reason: HOSPADM

## 2019-08-07 RX ORDER — METOPROLOL SUCCINATE 100 MG/1
200 TABLET, EXTENDED RELEASE ORAL DAILY
Status: DISCONTINUED | OUTPATIENT
Start: 2019-08-07 | End: 2019-08-10 | Stop reason: HOSPADM

## 2019-08-07 RX ORDER — DIGOXIN 0.25 MG/ML
250 INJECTION INTRAMUSCULAR; INTRAVENOUS ONCE
Status: COMPLETED | OUTPATIENT
Start: 2019-08-08 | End: 2019-08-08

## 2019-08-07 RX ORDER — INSULIN GLARGINE 100 [IU]/ML
45 INJECTION, SOLUTION SUBCUTANEOUS
Status: DISCONTINUED | OUTPATIENT
Start: 2019-08-07 | End: 2019-08-10 | Stop reason: HOSPADM

## 2019-08-07 RX ORDER — IPRATROPIUM BROMIDE AND ALBUTEROL SULFATE 2.5; .5 MG/3ML; MG/3ML
3 SOLUTION RESPIRATORY (INHALATION)
Status: DISCONTINUED | OUTPATIENT
Start: 2019-08-07 | End: 2019-08-09

## 2019-08-07 RX ORDER — CEFTRIAXONE 1 G/50ML
1000 INJECTION, SOLUTION INTRAVENOUS EVERY 24 HOURS
Status: DISCONTINUED | OUTPATIENT
Start: 2019-08-08 | End: 2019-08-09

## 2019-08-07 RX ORDER — SODIUM CHLORIDE, SODIUM GLUCONATE, SODIUM ACETATE, POTASSIUM CHLORIDE, MAGNESIUM CHLORIDE, SODIUM PHOSPHATE, DIBASIC, AND POTASSIUM PHOSPHATE .53; .5; .37; .037; .03; .012; .00082 G/100ML; G/100ML; G/100ML; G/100ML; G/100ML; G/100ML; G/100ML
75 INJECTION, SOLUTION INTRAVENOUS CONTINUOUS
Status: DISCONTINUED | OUTPATIENT
Start: 2019-08-07 | End: 2019-08-08

## 2019-08-07 RX ORDER — PANTOPRAZOLE SODIUM 40 MG/1
40 TABLET, DELAYED RELEASE ORAL
Status: DISCONTINUED | OUTPATIENT
Start: 2019-08-07 | End: 2019-08-10 | Stop reason: HOSPADM

## 2019-08-07 RX ORDER — FENOFIBRATE 145 MG/1
145 TABLET, COATED ORAL EVERY MORNING
Status: DISCONTINUED | OUTPATIENT
Start: 2019-08-07 | End: 2019-08-10 | Stop reason: HOSPADM

## 2019-08-07 RX ORDER — DIGOXIN 0.25 MG/ML
250 INJECTION INTRAMUSCULAR; INTRAVENOUS ONCE
Status: COMPLETED | OUTPATIENT
Start: 2019-08-07 | End: 2019-08-07

## 2019-08-07 RX ORDER — LOSARTAN POTASSIUM 50 MG/1
50 TABLET ORAL 2 TIMES DAILY
Status: DISCONTINUED | OUTPATIENT
Start: 2019-08-07 | End: 2019-08-10 | Stop reason: HOSPADM

## 2019-08-07 RX ORDER — TRAMADOL HYDROCHLORIDE 50 MG/1
50 TABLET ORAL EVERY 6 HOURS PRN
Status: DISCONTINUED | OUTPATIENT
Start: 2019-08-07 | End: 2019-08-10 | Stop reason: HOSPADM

## 2019-08-07 RX ORDER — ALPRAZOLAM 0.5 MG/1
1 TABLET ORAL
Status: DISCONTINUED | OUTPATIENT
Start: 2019-08-07 | End: 2019-08-10 | Stop reason: HOSPADM

## 2019-08-07 RX ORDER — SODIUM CHLORIDE, SODIUM GLUCONATE, SODIUM ACETATE, POTASSIUM CHLORIDE, MAGNESIUM CHLORIDE, SODIUM PHOSPHATE, DIBASIC, AND POTASSIUM PHOSPHATE .53; .5; .37; .037; .03; .012; .00082 G/100ML; G/100ML; G/100ML; G/100ML; G/100ML; G/100ML; G/100ML
1000 INJECTION, SOLUTION INTRAVENOUS ONCE
Status: COMPLETED | OUTPATIENT
Start: 2019-08-07 | End: 2019-08-07

## 2019-08-07 RX ORDER — SODIUM CHLORIDE, SODIUM GLUCONATE, SODIUM ACETATE, POTASSIUM CHLORIDE, MAGNESIUM CHLORIDE, SODIUM PHOSPHATE, DIBASIC, AND POTASSIUM PHOSPHATE .53; .5; .37; .037; .03; .012; .00082 G/100ML; G/100ML; G/100ML; G/100ML; G/100ML; G/100ML; G/100ML
100 INJECTION, SOLUTION INTRAVENOUS CONTINUOUS
Status: DISCONTINUED | OUTPATIENT
Start: 2019-08-07 | End: 2019-08-07

## 2019-08-07 RX ORDER — HYDROCODONE POLISTIREX AND CHLORPHENIRAMINE POLISTIREX 10; 8 MG/5ML; MG/5ML
5 SUSPENSION, EXTENDED RELEASE ORAL EVERY 12 HOURS PRN
Status: DISCONTINUED | OUTPATIENT
Start: 2019-08-07 | End: 2019-08-10 | Stop reason: HOSPADM

## 2019-08-07 RX ORDER — PREGABALIN 50 MG/1
50 CAPSULE ORAL 3 TIMES DAILY
Status: DISCONTINUED | OUTPATIENT
Start: 2019-08-07 | End: 2019-08-10 | Stop reason: HOSPADM

## 2019-08-07 RX ORDER — FENOFIBRATE 48 MG/1
160 TABLET, COATED ORAL EVERY MORNING
Status: DISCONTINUED | OUTPATIENT
Start: 2019-08-07 | End: 2019-08-07 | Stop reason: DRUGHIGH

## 2019-08-07 RX ORDER — QUETIAPINE 200 MG/1
200 TABLET, FILM COATED, EXTENDED RELEASE ORAL EVERY MORNING
Status: DISCONTINUED | OUTPATIENT
Start: 2019-08-07 | End: 2019-08-10 | Stop reason: HOSPADM

## 2019-08-07 RX ORDER — ASPIRIN 81 MG/1
81 TABLET, CHEWABLE ORAL EVERY MORNING
Status: DISCONTINUED | OUTPATIENT
Start: 2019-08-07 | End: 2019-08-10 | Stop reason: HOSPADM

## 2019-08-07 RX ORDER — ONDANSETRON 2 MG/ML
4 INJECTION INTRAMUSCULAR; INTRAVENOUS EVERY 6 HOURS PRN
Status: DISCONTINUED | OUTPATIENT
Start: 2019-08-07 | End: 2019-08-10 | Stop reason: HOSPADM

## 2019-08-07 RX ORDER — PRAVASTATIN SODIUM 40 MG
40 TABLET ORAL
Status: DISCONTINUED | OUTPATIENT
Start: 2019-08-07 | End: 2019-08-10 | Stop reason: HOSPADM

## 2019-08-07 RX ADMIN — ASPIRIN 81 MG 81 MG: 81 TABLET ORAL at 08:37

## 2019-08-07 RX ADMIN — LOSARTAN POTASSIUM 50 MG: 50 TABLET ORAL at 17:55

## 2019-08-07 RX ADMIN — DIGOXIN 250 MCG: 250 INJECTION, SOLUTION INTRAMUSCULAR; INTRAVENOUS; PARENTERAL at 20:11

## 2019-08-07 RX ADMIN — INSULIN LISPRO 2 UNITS: 100 INJECTION, SOLUTION INTRAVENOUS; SUBCUTANEOUS at 16:47

## 2019-08-07 RX ADMIN — SODIUM CHLORIDE 1000 ML: 0.9 INJECTION, SOLUTION INTRAVENOUS at 01:09

## 2019-08-07 RX ADMIN — INSULIN GLARGINE 45 UNITS: 100 INJECTION, SOLUTION SUBCUTANEOUS at 21:33

## 2019-08-07 RX ADMIN — PREGABALIN 50 MG: 50 CAPSULE ORAL at 21:04

## 2019-08-07 RX ADMIN — ALPRAZOLAM 1 MG: 0.5 TABLET ORAL at 21:56

## 2019-08-07 RX ADMIN — METOPROLOL SUCCINATE 200 MG: 100 TABLET, EXTENDED RELEASE ORAL at 08:37

## 2019-08-07 RX ADMIN — IPRATROPIUM BROMIDE AND ALBUTEROL SULFATE 3 ML: 2.5; .5 SOLUTION RESPIRATORY (INHALATION) at 19:41

## 2019-08-07 RX ADMIN — BUSPIRONE HYDROCHLORIDE 15 MG: 10 TABLET ORAL at 08:37

## 2019-08-07 RX ADMIN — LOSARTAN POTASSIUM 50 MG: 50 TABLET ORAL at 08:37

## 2019-08-07 RX ADMIN — QUETIAPINE FUMARATE 400 MG: 200 TABLET, EXTENDED RELEASE ORAL at 21:32

## 2019-08-07 RX ADMIN — QUETIAPINE FUMARATE 200 MG: 200 TABLET, EXTENDED RELEASE ORAL at 08:38

## 2019-08-07 RX ADMIN — BUSPIRONE HYDROCHLORIDE 15 MG: 10 TABLET ORAL at 17:54

## 2019-08-07 RX ADMIN — FENOFIBRATE 145 MG: 145 TABLET, COATED ORAL at 08:38

## 2019-08-07 RX ADMIN — INSULIN LISPRO 2 UNITS: 100 INJECTION, SOLUTION INTRAVENOUS; SUBCUTANEOUS at 21:36

## 2019-08-07 RX ADMIN — INSULIN LISPRO 2 UNITS: 100 INJECTION, SOLUTION INTRAVENOUS; SUBCUTANEOUS at 12:00

## 2019-08-07 RX ADMIN — IPRATROPIUM BROMIDE AND ALBUTEROL SULFATE 3 ML: 2.5; .5 SOLUTION RESPIRATORY (INHALATION) at 07:14

## 2019-08-07 RX ADMIN — CEFEPIME HYDROCHLORIDE 2000 MG: 2 INJECTION, POWDER, FOR SOLUTION INTRAVENOUS at 13:26

## 2019-08-07 RX ADMIN — DIGOXIN 250 MCG: 0.25 INJECTION INTRAMUSCULAR; INTRAVENOUS at 13:00

## 2019-08-07 RX ADMIN — SODIUM CHLORIDE, SODIUM GLUCONATE, SODIUM ACETATE, POTASSIUM CHLORIDE, MAGNESIUM CHLORIDE, SODIUM PHOSPHATE, DIBASIC, AND POTASSIUM PHOSPHATE 75 ML/HR: .53; .5; .37; .037; .03; .012; .00082 INJECTION, SOLUTION INTRAVENOUS at 17:56

## 2019-08-07 RX ADMIN — PRAVASTATIN SODIUM 40 MG: 40 TABLET ORAL at 17:00

## 2019-08-07 RX ADMIN — PREGABALIN 50 MG: 50 CAPSULE ORAL at 15:27

## 2019-08-07 RX ADMIN — IPRATROPIUM BROMIDE AND ALBUTEROL SULFATE 3 ML: 2.5; .5 SOLUTION RESPIRATORY (INHALATION) at 03:17

## 2019-08-07 RX ADMIN — APIXABAN 5 MG: 5 TABLET, FILM COATED ORAL at 17:55

## 2019-08-07 RX ADMIN — SODIUM CHLORIDE, SODIUM GLUCONATE, SODIUM ACETATE, POTASSIUM CHLORIDE, MAGNESIUM CHLORIDE, SODIUM PHOSPHATE, DIBASIC, AND POTASSIUM PHOSPHATE 1000 ML: .53; .5; .37; .037; .03; .012; .00082 INJECTION, SOLUTION INTRAVENOUS at 16:00

## 2019-08-07 RX ADMIN — DILTIAZEM HYDROCHLORIDE 20 MG: 5 INJECTION INTRAVENOUS at 01:02

## 2019-08-07 RX ADMIN — CEFEPIME HYDROCHLORIDE 2000 MG: 2 INJECTION, SOLUTION INTRAVENOUS at 01:08

## 2019-08-07 RX ADMIN — PANTOPRAZOLE SODIUM 40 MG: 40 TABLET, DELAYED RELEASE ORAL at 05:53

## 2019-08-07 RX ADMIN — INSULIN LISPRO 22 UNITS: 100 INJECTION, SOLUTION INTRAVENOUS; SUBCUTANEOUS at 16:45

## 2019-08-07 RX ADMIN — IPRATROPIUM BROMIDE AND ALBUTEROL SULFATE 3 ML: 2.5; .5 SOLUTION RESPIRATORY (INHALATION) at 13:52

## 2019-08-07 RX ADMIN — PREGABALIN 50 MG: 50 CAPSULE ORAL at 08:37

## 2019-08-07 RX ADMIN — INSULIN LISPRO 20 UNITS: 100 INJECTION, SOLUTION INTRAVENOUS; SUBCUTANEOUS at 08:38

## 2019-08-07 RX ADMIN — INSULIN LISPRO 17 UNITS: 100 INJECTION, SOLUTION INTRAVENOUS; SUBCUTANEOUS at 12:45

## 2019-08-07 RX ADMIN — APIXABAN 5 MG: 5 TABLET, FILM COATED ORAL at 08:37

## 2019-08-07 RX ADMIN — INSULIN LISPRO 4 UNITS: 100 INJECTION, SOLUTION INTRAVENOUS; SUBCUTANEOUS at 08:35

## 2019-08-07 RX ADMIN — TRAMADOL HYDROCHLORIDE 50 MG: 50 TABLET, FILM COATED ORAL at 14:20

## 2019-08-07 NOTE — ASSESSMENT & PLAN NOTE
Cr elevated at 1 51, baseline appears around 1 3  Received 1L NS bolus in ER  Avoid nephrotoxic agents  Trend

## 2019-08-07 NOTE — TELEPHONE ENCOUNTER
Spoke to pt on Monday about going to ER if he statre to feel worse , I tried to contact him this morning to check on his status lmtrc tc/cma

## 2019-08-07 NOTE — TELEPHONE ENCOUNTER
Spoke to pt this afternoon he was admitted into hospital yesterday(pneumonia)the patient is not feeling good at all , very weak tc/cma
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
## 2019-08-07 NOTE — ED PROVIDER NOTES
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    Associated Diagnoses: Diabetic polyneuropathy associated with type 2 diabetes mellitus (HCC)      !! QUEtiapine (SEROquel XR) 200 mg 24 hr tablet Take 200 mg by mouth every morning   Refills: 1      !! QUEtiapine (SEROquel XR) 400 mg 24 hr tablet Take 400 mg by mouth daily at bedtime        VENTOLIN  (90 Base) MCG/ACT inhaler INHALE 2 PUFFS 4 (FOUR) TIMES A DAY  Qty: 18 g, Refills: 3    Associated Diagnoses: Centrilobular emphysema (HCC)      VOLTAREN 1 % APPLY 2 G TOPICALLY 2 (TWO) TIMES A DAY AS NEEDED (FOR PAIN)  Qty: 100 g, Refills: 1    Associated Diagnoses: Muscle spasm       !! - Potential duplicate medications found  Please discuss with provider  No discharge procedures on file      ED Provider  Electronically Signed by           Ej Islas DO  08/07/19 0315
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
## 2019-08-07 NOTE — ASSESSMENT & PLAN NOTE
Lab Results   Component Value Date    HGBA1C 8 3 (H) 04/19/2019       No results for input(s): POCGLU in the last 72 hours    Continue home medications - dapagliflozin, lantus, victoza; hold metformin  Accuchecks AC/HS with insulin sliding scale coverage
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
## 2019-08-07 NOTE — SEPSIS NOTE
Sepsis Note   Akila Hagan 60 y o  male MRN: [id number]  Unit/Bed#: ED CT1 Encounter: [id number]      qSOFA     Row Name 08/07/19 0100 08/07/19 0045 08/07/19 0030 08/07/19 0015 08/06/19 2345    Altered mental status GCS < 15              Respiratory Rate > / =22  1  1  1  1  1    Systolic BP < / =160  0    0        Q Sofa Score  1  1  1  1  1    Row Name 08/06/19 2330 08/06/19 2315 08/06/19 2300 08/06/19 2251 08/06/19 2248    Altered mental status GCS < 15              Respiratory Rate > / =22  1  1  1    1    Systolic BP < / =956  0    0  0      Q Sofa Score  1  1  1  1          Initial Sepsis Screening     Row Name 08/07/19 0113                Is the patient's history suggestive of a new or worsening infection? (!) Yes (Proceed)  -OO        Suspected source of infection  pneumonia  -OO        Are two or more of the following signs & symptoms of infection both present and new to the patient? (!) Yes (Proceed)  -OO        Indicate SIRS criteria  Tachycardia > 90 bpm;Tachypnea > 20 resp per min;Leukocytosis (WBC > 21219 IJL)  -OO        If the answer is yes to both questions, suspicion of sepsis is present          If severe sepsis is present AND tissue hypoperfusion perists in the hour after fluid resuscitation or lactate > 4, the patient meets criteria for SEPTIC SHOCK          Are any of the following organ dysfunction criteria present within 6 hours of suspected infection and SIRS criteria that are NOT considered to be chronic conditions? (!) Yes  -OO        Organ dysfunction  Lactate > 2 0 mmol/L  -OO        Date of presentation of severe sepsis  08/07/19  -OO        Time of presentation of severe sepsis  0020  -OO        Tissue hypoperfusion persists in the hour after crystalloid fluid administration, evidenced, by either:          Was hypotension present within one hour of the conclusion of crystalloid fluid administration?   No  -OO        Date of presentation of septic shock   Time of presentation of septic shock            User Key  (r) = Recorded By, (t) = Taken By, (c) = Cosigned By    Initials Name Provider Type    DO Evelyn Valencia

## 2019-08-07 NOTE — ASSESSMENT & PLAN NOTE
Patient with history of atrial fibrillation found to be in RVR in ER    He was given metoprolol and cardizem in the ER with improvement in heart rate   Admit to telemetry   Rate control with metoprolol   Continue with eliquis for anticoagulation  [facility] Cardiology consultation

## 2019-08-07 NOTE — OCCUPATIONAL THERAPY NOTE
OT EVALUATION       08/07/19 1320   Note Type   Note type Eval only   Restrictions/Precautions   Other Precautions Chair Alarm; Bed Alarm; Fall Risk   Pain Assessment   Pain Assessment No/denies pain   Home Living   Type of Home Apartment  (1 flight of steps to enter )   Home Layout One level   Bathroom Shower/Tub Tub/shower unit   Bathroom Equipment Shower chair   Home Equipment Walker;Cane  (O2 24 hours a day)   Prior Function   Level of [place] Independent with ADLs and functional mobility; Needs assistance with IADLs   Lives With Other (Comment)  (roommate)   ADL Assistance Independent   IADLs Needs assistance   Comments pt has groceries delivered    ADL   Eating Assistance 5  Supervision/Setup   Grooming Assistance 4  Minimal Assistance   UB Bathing Assistance 4  Minimal Assistance   LB [address] 3  Moderate Assistance   [address] 4  Minimal Assistance    [facility] [address] 3  Moderate [address]  3  Moderate Assistance   Bed Mobility   Sit to Supine 4  Minimal assistance   Additional items LE management   Transfers   Sit to Stand 4  Minimal assistance   Additional items Assist x 1   Stand to Sit 4  Minimal assistance   Additional items Assist x 1   Functional Mobility   Functional Mobility 4  Minimal assistance   Additional Comments 10 feet   Additional items Rolling walker   Balance   Static Sitting Fair +   Dynamic Sitting Fair   Static Standing Fair -   Dynamic Standing Fair -   Activity Tolerance   Activity Tolerance Patient limited by fatigue  (weakness)   RUE Assessment   RUE Assessment WFL  (3+/4-/5)   LUE Assessment   LUE Assessment WFL  (3+/4-/5)   Cognition   Overall Cognitive Status WFL   Arousal/Participation Cooperative   Attention Within functional limits   Orientation Level Oriented X4   Following Commands Follows all commands and directions without difficulty   Assessment   Limitation Decreased ADL status; Decreased UE strength;Decreased Safe judgement during ADL;Decreased endurance;Decreased self-care trans;Decreased high-level ADLs  (decreased balance and mobility )   Prognosis Good   Assessment Patient evaluated by Occupational Therapy  Patient admitted with Shortness of breath  The patients occupational profile, medical and therapy history includes a extensive additional review of physical, cognitive, or psychosocial history related to current functional performance  Comorbidities affecting functional mobility and ADLS include: Bipolar, anxiety, CAD, COPD, depression, diabetes and hypertension  Prior to admission, patient was independent with functional mobility with cane at times, independent with ADLS and requiring assist for IADLS  The evaluation identifies the following performance deficits: weakness, impaired balance, decreased endurance, increased fall risk, new onset of impairment of functional mobility, decreased ADLS, decreased IADLS, decreased activity tolerance, decreased safety awareness, impaired judgement, SOB upon exertion and decreased strength, that result in activity limitations and/or participation restrictions  This evaluation requires clinical decision making of high complexity, because the patient presents with comorbidites that affect occupational performance and required significant modification of tasks or assistance with consideration of multiple treatment options  The Barthel Index was used as a functional outcome tool presenting with a score of 50, indicating marked limitations of functional mobility and ADLS  Patient will benefit from skilled Occupational Therapy services to address above deficits and facilitate a safe return to prior level of function     Goals   Patient Goals get stronger, go home   STG Time Frame   (1-7 days)   Short Term Goal  Goals established to promote patient goal of getting stronger and going home:  Patient will increase standing tolerance to 2 minutes during ADL task to decrease assistance level and decrease fall risk; Patient will increase bed mobility to supervision in preparation for ADLS and transfers; Patient will increase functional mobility to and from bathroom with rolling walker with supervision to increase performance with ADLS and to use a toilet; Patient will tolerate 10 minutes of UE ROM/strengthening to increase general activity tolerance and performance in ADLS/IADLS; Patient will improve functional activity tolerance to 10 minutes of sustained functional tasks to increase participation in basic self-care and decrease assistance level;  Patient will be able to to verbalize understanding and perform energy conservation/proper body mechanics during ADLS and functional mobility at least 75% of the time with minimal cueing to decrease signs of fatigue and increase stamina to return to prior level of function; Patient will increase dynamic sitting balance to fair+ to improve the ability to sit at edge of bed or on a chair for ADLS;  Patient will increase dynamic standing balance to fair to improve postural stability and decrease fall risk during standing ADLS and transfers  LTG Time Frame   (8-14 days)   Long Term Goal Goals established to promote patient goal of getting stronger and going home:  Patient will increase standing tolerance to 4 minutes during ADL task to decrease assistance level and decrease fall risk; Patient will increase bed mobility to independent in preparation for ADLS and transfers;  Patient will increase functional mobility to and from bathroom with rolling walker independently to increase performance with ADLS and to use a toilet; Patient will tolerate 20 minutes of UE ROM/strengthening to increase general activity tolerance and performance in ADLS/IADLS; Patient will improve functional activity tolerance to 20 minutes of sustained functional tasks to increase participation in basic self-care and decrease assistance level;  Patient will be able to to verbalize understanding and perform energy conservation/proper body mechanics during ADLS and functional mobility at least 90% of the time without cueing to decrease signs of fatigue and increase stamina to return to prior level of function; Patient will increase dynamic sitting balance to good to improve the ability to sit at edge of bed or on a chair for ADLS;  Patient will increase dynamic standing balance to fair+ to improve postural stability and decrease fall risk during standing ADLS and transfers  Functional Transfer Goals   Pt Will Perform All Functional Transfers   (STG supervision LTG independent )   ADL Goals   Pt Will Perform Grooming Standing at sink  (STG supervision LTG independent )   Pt Will Perform Bathing   (STG min assist LTG supervision )   Pt Will Perform UE Dressing   (STG independent )   Pt Will Perform LE Dressing   (STG min assist LTG supervisio )   Pt Will Perform Toileting   (STG supervision LTG independent )   Plan   Treatment Interventions ADL retraining;UE strengthening/ROM; Functional transfer training; Endurance training;Patient/family training;Equipment evaluation/education; Activityengagement; Compensatory technique education; Energy conservation   Goal Expiration Date 08/21/19   OT Frequency 3-5x/wk   Recommendation   OT Discharge Recommendation Short Term Rehab   Barthel Index   Feeding 10   Bathing 0   Grooming Score 0   Dressing Score 5   Bladder Score 10   Bowels Score 10   Toilet Use Score 5   Transfers (Bed/Chair) Score 10   Mobility (Level Surface) Score 0   Stairs Score 0   Barthel Index Score 50   Licensure   [place] License Number  Kyle Davis Gary 87 OTR/L [id number]

## 2019-08-07 NOTE — ASSESSMENT & PLAN NOTE
Patient presented to the ED with complaints of shortness of breath  He states that for the past couple of days he has had shortness of breath with of productive cough  He states that he has been more confused than usual at home seeing things that were not there  He has some mild chest tightness with inspiration  Shortness of breath likely multifactorial   Patient was found to have a likely pneumonia and to be in atrial fibrillation with RVR  See plans below

## 2019-08-07 NOTE — TELEPHONE ENCOUNTER
Health Call  Patient Name: Elaine Maza  Gender: Male  : 1960  Age: 60 Y 8 M 15 D  Return Phone  Number: [phone] (Cell)  Address: [address] [place] [address]  City/State/Zip: [place] [address]  Practice Name: Carroll Gallo 3  Practice Charged:  Physician:  [facility] Name:  Relationship To  Patient: Self  Return Phone Number: [phone] (Cell)  Presenting Problem: "I have been falling a lot today and  have lost in balance  I am also very  dizzy when I get up "  Service Type: Triage  Charged Service 1:  Pharmacy Name and  Number:  Nurse Assessment  Nurse: MARY ANN Huerta Bobette Roers Date/Time: 2019 10:11:12 PM  Type of assessment required:  ---General (Adult or Child)  Duration of Current S/S  ---2 days  Location/Radiation  ---Neuro  Temperature (F) and route:  ---No thermometer available  Other S/S  ---Weakness, has fallen 4 times today  Unknown if sustained head injury  Pt is confused  and realizes that he is having hallucinating  BS last night 150 (sensor broke, unable to  further monitor BS today)  Symptom progression:  ---worse  Protocols  Protocol Title Nurse Date/Time  Dizziness - Lightheadedness MARY ANN Huerta Bobette Roers 2019 10:12:50 PM  Question Caller Affirmed    Disp  Time Disposition Final User  2019 10:13:59 PM Go to ED Now (or PCP triage) MARY ANN Huerta Bobette Roers  2019 10:16:21 PM RN Triaged Yes MARY ANN Huerta, [facility] Advice Given Per Protocol  GO TO ED NOW (OR PCP TRIAGE): DRIVING: Another adult should drive  BRING MEDICINES: * Please bring a list of your  current medicines when you go to the Emergency Department (ER)  * It is also a good idea to bring the pill bottles too  This will help the  doctor to make certain you are taking the right medicines and the right dose  CARE ADVICE given per Dizziness (Adult) guideline    Caller Understands: Yes  Caller Disagree/Comply: Comply  PreDisposition: Unsure
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
## 2019-08-07 NOTE — ASSESSMENT & PLAN NOTE
As evidence by lactate of 2 8, WBC of 14 4, tachypnea, tachycardia  CXR per ER read is concerning for PNA    · Admit to medicine  · Continue cefepime  · Send urine for strep and legionella  · Blood cultures pending  · Send procalcitonin and trend daily  · Trend lactate  · Supportive therapies with duonebs, respiratory protocol

## 2019-08-07 NOTE — PHYSICAL THERAPY NOTE
PT EVALUATION     08/07/19 1310   Pain Assessment   Pain Assessment No/denies pain   Home Living   Type of [address] One level;Stairs to enter with rails  (full flight of stairs to enter home with B rails)   [address] [place] [address] chair   Home Equipment Walker;Cane  (24 hour O2)   Prior Function   Level of [place] Independent with ADLs and functional mobility   Lives With Other (Comment)  (roomate who does not assist in any way)   ADL Assistance Independent   IADLs Needs assistance   Comments patient has groceries delivered and does own cooking, pays for cleaning assist   Restrictions/Precautions   Other Precautions Chair Alarm; Bed Alarm; Fall Risk;O2   General   Additional Pertinent History chart reviewed, patient admitted with pneumonia, weakness  Now presents as significantly deconditioned and with SOB limiting all functional mobility at this time   Family/Caregiver Present No   Cognition   Overall Cognitive Status WFL   Arousal/Participation Cooperative   Attention Within functional limits   Orientation Level Oriented X4   Following Commands Follows all commands and directions without difficulty   RLE Assessment   RLE Assessment WFL  (strength 3+/5)   LLE Assessment   LLE Assessment WFL  (strength 3+/5)   Coordination   Movements are Fluid and Coordinated 0   Bed Mobility   Sit to Supine 4  Minimal assistance   Additional items Assist x 1;Verbal cues;LE management   Transfers   Sit to Stand 4  Minimal assistance   Additional items Assist x 1;Verbal cues   Stand to Sit 4  Minimal assistance   Additional items Assist x 1;Verbal cues   Ambulation/Elevation   Gait Assistance 4  Minimal assist   Additional items Assist x 1;Verbal cues; Tactile cues   Assistive Device Rolling walker   Distance 10 feet from bed to chair with SOB, forward flexed posturing and knees buckling at times   Balance   Static Sitting Fair   Dynamic Sitting Fair -   Static Standing Poor +   Dynamic Standing Poor Ambulatory Poor   Activity Tolerance   Activity Tolerance Patient limited by fatigue   Nurse Made Aware yes   Assessment   Prognosis Good   Problem List Decreased strength;Decreased range of motion;Decreased endurance; Impaired balance;Decreased mobility; Decreased coordination   Assessment Patient seen for Physical Therapy evaluation  Patient admitted with pneumonia, weakness  Comorbidities affecting patient's physical performance include: bipolar affectivedisorder, obesity, htn, afib, DM with neuropathy, back pain, COPD  Personal factors affecting patient at time of initial evaluation include: stairs to enter home, inability to ambulate household distances, inability to navigate community distances and inability to perform dynamic tasks in community  Prior to admission, patient was independent with functional mobility without assistive device  Please find objective findings from Physical Therapy assessment regarding body systems outlined above with impairments and limitations including weakness, impaired balance, decreased endurance, gait deviations, decreased activity tolerance, decreased functional mobility tolerance, fall risk and SOB upon exertion  The Barthel Index was used as a functional outcome tool presenting with a score of 50 today indicating marked limitations of functional mobility and ADLS  Patient's clinical presentation is currently unstable/unpredictable as seen in patient's presentation of changing level of pain, increased fall risk, new onset of impairment of functional mobility, decreased endurance and new onset of weakness  Pt would benefit from continued Physical Therapy treatment to address deficits as defined above and maximize level of functional mobility  As demonstrated by objective findings, the assigned level of complexity for this evaluation is high     Goals   Patient Goals get stronger, go home   STG Expiration Date 08/14/19   Short Term Goal #1 transfers and gait with roller walker with supervision   Short Term Goal #2 gait endurance to functional household distances, strength BLEs 3+/4- all to meet patient goal of improved strength and return home   LTG Expiration Date 08/21/19   Long Term Goal #1 transfers and gait with roller walker independently   Long Term Goal #2 gait endurance to functional community distances   Plan   Treatment/Interventions ADL retraining;Functional transfer training;LE strengthening/ROM; Elevations; Therapeutic exercise; Endurance training;Patient/family training;Equipment eval/education; Bed mobility;Gait training; Compensatory technique education   PT Frequency 5x/wk   Recommendation   Recommendation Short-term skilled PT   Barthel Index   Feeding 10   Bathing 0   Grooming Score 0   Dressing Score 5   Bladder Score 10   Bowels Score 10   Toilet Use Score 5   Transfers (Bed/Chair) Score 10   Mobility (Level Surface) Score 0   Stairs Score 0   Barthel Index Score 48   Licensure   [place] License Number  [employer] PT [id number]

## 2019-08-07 NOTE — SOCIAL WORK
Pt has LOS 0  Pt resides alone in a second floor apt, has 14 steps to enter  Uses RW or cane as needed  Has home oxygen, portable device, and nebulizer provided by [facility]  Was open to [facility] prior to admission, is requested a referral again to continue  History of STR at [facility]  Has BiPAP also  Uses [employer] at home for shopping  Uses taxi service for transportation  Had concerns about needing help at home  Has attempted to get [employer] in the past, but is on a certain plan as he was working and not home bound  Provided [employer] info brochure again for him to call for direction  Can change the direction of his MLTSS per pt  Ref LW/POA info  Uses [facility] in [place]  Did receive email from Carlos, outpatient coordinator and did confirm with pt that he just recently started with the [facility] for 5 days per week  Rec's individual and group programs  Pt states is related to depression and his dx of bi-polar  Is involved with [facility] also  States he is to be signed up for [employer] which may help with some of his transport needs  Explained role of cm, no d/c needs anticipated  States a friend will drive him home  CM reviewed d/c planning process including the following: identifying help at home, patient preference for d/c planning needs, and availability of the treatment team to discuss questions or concerns patient and/or family may have regarding understanding of medications and recognizing signs and symptoms once discharged  CM also encouraged patient to follow up with all recommended appointments after discharge  Patient advised of importance for patient and family to participate in managing patient's medical well being

## 2019-08-08 PROBLEM — J20.9 ACUTE BRONCHITIS: Status: ACTIVE | Noted: 2018-04-04

## 2019-08-08 LAB
ANION GAP SERPL CALCULATED.3IONS-SCNC: 9 MMOL/L (ref 4–13)
BASOPHILS # BLD AUTO: 0.04 THOUSANDS/ΜL (ref 0–0.1)
BASOPHILS NFR BLD AUTO: 1 % (ref 0–1)
BUN SERPL-MCNC: 17 MG/DL (ref 5–25)
CALCIUM SERPL-MCNC: 7.8 MG/DL (ref 8.3–10.1)
CHLORIDE SERPL-SCNC: 100 MMOL/L (ref 100–108)
CO2 SERPL-SCNC: 26 MMOL/L (ref 21–32)
CREAT SERPL-MCNC: 0.81 MG/DL (ref 0.6–1.3)
EOSINOPHIL # BLD AUTO: 0.11 THOUSAND/ΜL (ref 0–0.61)
EOSINOPHIL NFR BLD AUTO: 2 % (ref 0–6)
ERYTHROCYTE [DISTWIDTH] IN BLOOD BY AUTOMATED COUNT: 13.2 % (ref 11.6–15.1)
GFR SERPL CREATININE-BSD FRML MDRD: 97 ML/MIN/1.73SQ M
GLUCOSE SERPL-MCNC: 165 MG/DL (ref 65–140)
GLUCOSE SERPL-MCNC: 181 MG/DL (ref 65–140)
GLUCOSE SERPL-MCNC: 213 MG/DL (ref 65–140)
GLUCOSE SERPL-MCNC: 226 MG/DL (ref 65–140)
GLUCOSE SERPL-MCNC: 318 MG/DL (ref 65–140)
HCT VFR BLD AUTO: 41.9 % (ref 36.5–49.3)
HGB BLD-MCNC: 14.5 G/DL (ref 12–17)
IMM GRANULOCYTES # BLD AUTO: 0.04 THOUSAND/UL (ref 0–0.2)
IMM GRANULOCYTES NFR BLD AUTO: 1 % (ref 0–2)
LYMPHOCYTES # BLD AUTO: 2.24 THOUSANDS/ΜL (ref 0.6–4.47)
LYMPHOCYTES NFR BLD AUTO: 41 % (ref 14–44)
MCH RBC QN AUTO: 32.2 PG (ref 26.8–34.3)
MCHC RBC AUTO-ENTMCNC: 34.6 G/DL (ref 31.4–37.4)
MCV RBC AUTO: 93 FL (ref 82–98)
MONOCYTES # BLD AUTO: 0.51 THOUSAND/ΜL (ref 0.17–1.22)
MONOCYTES NFR BLD AUTO: 9 % (ref 4–12)
NEUTROPHILS # BLD AUTO: 2.56 THOUSANDS/ΜL (ref 1.85–7.62)
NEUTS SEG NFR BLD AUTO: 46 % (ref 43–75)
NRBC BLD AUTO-RTO: 0 /100 WBCS
PLATELET # BLD AUTO: 156 THOUSANDS/UL (ref 149–390)
PMV BLD AUTO: 10.5 FL (ref 8.9–12.7)
POTASSIUM SERPL-SCNC: 4.7 MMOL/L (ref 3.5–5.3)
PROCALCITONIN SERPL-MCNC: 0.06 NG/ML
RBC # BLD AUTO: 4.5 MILLION/UL (ref 3.88–5.62)
SODIUM SERPL-SCNC: 135 MMOL/L (ref 136–145)
WBC # BLD AUTO: 5.5 THOUSAND/UL (ref 4.31–10.16)

## 2019-08-08 PROCEDURE — 82948 REAGENT STRIP/BLOOD GLUCOSE: CPT

## 2019-08-08 PROCEDURE — 85025 COMPLETE CBC W/AUTO DIFF WBC: CPT | Performed by: INTERNAL MEDICINE

## 2019-08-08 PROCEDURE — 99232 SBSQ HOSP IP/OBS MODERATE 35: CPT | Performed by: INTERNAL MEDICINE

## 2019-08-08 PROCEDURE — 94660 CPAP INITIATION&MGMT: CPT

## 2019-08-08 PROCEDURE — 80048 BASIC METABOLIC PNL TOTAL CA: CPT | Performed by: INTERNAL MEDICINE

## 2019-08-08 PROCEDURE — 94640 AIRWAY INHALATION TREATMENT: CPT

## 2019-08-08 PROCEDURE — 94760 N-INVAS EAR/PLS OXIMETRY 1: CPT

## 2019-08-08 PROCEDURE — 94664 DEMO&/EVAL PT USE INHALER: CPT

## 2019-08-08 PROCEDURE — 84145 PROCALCITONIN (PCT): CPT | Performed by: PHYSICIAN ASSISTANT

## 2019-08-08 RX ORDER — DIGOXIN 125 MCG
250 TABLET ORAL DAILY
Status: DISCONTINUED | OUTPATIENT
Start: 2019-08-09 | End: 2019-08-10 | Stop reason: HOSPADM

## 2019-08-08 RX ORDER — METHYLPREDNISOLONE SODIUM SUCCINATE 40 MG/ML
20 INJECTION, POWDER, LYOPHILIZED, FOR SOLUTION INTRAMUSCULAR; INTRAVENOUS 3 TIMES DAILY
Status: DISCONTINUED | OUTPATIENT
Start: 2019-08-08 | End: 2019-08-10 | Stop reason: HOSPADM

## 2019-08-08 RX ADMIN — IPRATROPIUM BROMIDE AND ALBUTEROL SULFATE 3 ML: 2.5; .5 SOLUTION RESPIRATORY (INHALATION) at 07:26

## 2019-08-08 RX ADMIN — INSULIN LISPRO 17 UNITS: 100 INJECTION, SOLUTION INTRAVENOUS; SUBCUTANEOUS at 12:37

## 2019-08-08 RX ADMIN — TRAMADOL HYDROCHLORIDE 50 MG: 50 TABLET, FILM COATED ORAL at 20:28

## 2019-08-08 RX ADMIN — INSULIN LISPRO 1 UNITS: 100 INJECTION, SOLUTION INTRAVENOUS; SUBCUTANEOUS at 22:18

## 2019-08-08 RX ADMIN — INSULIN LISPRO 4 UNITS: 100 INJECTION, SOLUTION INTRAVENOUS; SUBCUTANEOUS at 08:33

## 2019-08-08 RX ADMIN — PRAVASTATIN SODIUM 40 MG: 40 TABLET ORAL at 16:28

## 2019-08-08 RX ADMIN — INSULIN GLARGINE 45 UNITS: 100 INJECTION, SOLUTION SUBCUTANEOUS at 22:16

## 2019-08-08 RX ADMIN — METOPROLOL SUCCINATE 200 MG: 100 TABLET, EXTENDED RELEASE ORAL at 08:32

## 2019-08-08 RX ADMIN — INSULIN LISPRO 8 UNITS: 100 INJECTION, SOLUTION INTRAVENOUS; SUBCUTANEOUS at 12:35

## 2019-08-08 RX ADMIN — APIXABAN 5 MG: 5 TABLET, FILM COATED ORAL at 08:32

## 2019-08-08 RX ADMIN — ALPRAZOLAM 1 MG: 0.5 TABLET ORAL at 22:36

## 2019-08-08 RX ADMIN — HYDROCODONE POLISTIREX AND CHLORPHENIRAMINE POLISTIREX 5 ML: 10; 8 SUSPENSION, EXTENDED RELEASE ORAL at 12:33

## 2019-08-08 RX ADMIN — PANTOPRAZOLE SODIUM 40 MG: 40 TABLET, DELAYED RELEASE ORAL at 06:14

## 2019-08-08 RX ADMIN — CEFTRIAXONE 1000 MG: 1 INJECTION, SOLUTION INTRAVENOUS at 08:33

## 2019-08-08 RX ADMIN — PREGABALIN 50 MG: 50 CAPSULE ORAL at 21:59

## 2019-08-08 RX ADMIN — APIXABAN 5 MG: 5 TABLET, FILM COATED ORAL at 18:17

## 2019-08-08 RX ADMIN — QUETIAPINE FUMARATE 200 MG: 200 TABLET, EXTENDED RELEASE ORAL at 08:36

## 2019-08-08 RX ADMIN — PREGABALIN 50 MG: 50 CAPSULE ORAL at 16:28

## 2019-08-08 RX ADMIN — LOSARTAN POTASSIUM 50 MG: 50 TABLET ORAL at 08:32

## 2019-08-08 RX ADMIN — METHYLPREDNISOLONE SODIUM SUCCINATE 20 MG: 40 INJECTION, POWDER, FOR SOLUTION INTRAMUSCULAR; INTRAVENOUS at 22:02

## 2019-08-08 RX ADMIN — DIGOXIN 250 MCG: 250 INJECTION, SOLUTION INTRAMUSCULAR; INTRAVENOUS; PARENTERAL at 04:03

## 2019-08-08 RX ADMIN — BUSPIRONE HYDROCHLORIDE 15 MG: 10 TABLET ORAL at 08:32

## 2019-08-08 RX ADMIN — IPRATROPIUM BROMIDE AND ALBUTEROL SULFATE 3 ML: 2.5; .5 SOLUTION RESPIRATORY (INHALATION) at 13:29

## 2019-08-08 RX ADMIN — QUETIAPINE FUMARATE 400 MG: 200 TABLET, EXTENDED RELEASE ORAL at 22:03

## 2019-08-08 RX ADMIN — TRAMADOL HYDROCHLORIDE 50 MG: 50 TABLET, FILM COATED ORAL at 12:33

## 2019-08-08 RX ADMIN — LOSARTAN POTASSIUM 50 MG: 50 TABLET ORAL at 18:17

## 2019-08-08 RX ADMIN — BUSPIRONE HYDROCHLORIDE 15 MG: 10 TABLET ORAL at 18:17

## 2019-08-08 RX ADMIN — METHYLPREDNISOLONE SODIUM SUCCINATE 20 MG: 40 INJECTION, POWDER, FOR SOLUTION INTRAMUSCULAR; INTRAVENOUS at 18:25

## 2019-08-08 RX ADMIN — INSULIN LISPRO 22 UNITS: 100 INJECTION, SOLUTION INTRAVENOUS; SUBCUTANEOUS at 18:18

## 2019-08-08 RX ADMIN — IPRATROPIUM BROMIDE AND ALBUTEROL SULFATE 3 ML: 2.5; .5 SOLUTION RESPIRATORY (INHALATION) at 02:47

## 2019-08-08 RX ADMIN — ASPIRIN 81 MG 81 MG: 81 TABLET ORAL at 08:32

## 2019-08-08 RX ADMIN — IPRATROPIUM BROMIDE AND ALBUTEROL SULFATE 3 ML: 2.5; .5 SOLUTION RESPIRATORY (INHALATION) at 19:18

## 2019-08-08 RX ADMIN — INSULIN LISPRO 2 UNITS: 100 INJECTION, SOLUTION INTRAVENOUS; SUBCUTANEOUS at 18:17

## 2019-08-08 RX ADMIN — PREGABALIN 50 MG: 50 CAPSULE ORAL at 08:32

## 2019-08-08 RX ADMIN — FENOFIBRATE 145 MG: 145 TABLET, COATED ORAL at 08:32

## 2019-08-08 NOTE — ASSESSMENT & PLAN NOTE
Continue cpap QHS and continue oxygen supplementation at 2 liters/minute  Component could be related to acute bronchitis and COPD exacerbation  Started on Solu-Medrol 20 milligram IV q 8 hours  Continue nebulizers and antibiotics

## 2019-08-08 NOTE — PROGRESS NOTES
Progress Note - Pulmonary   Jessica Montalvo 60 y o  male MRN: [id number]  Unit/Bed#: [address] [place] [address] Encounter: [id number]      Assessment/Plan:     Possible pneumonia:  -patient currently on gram-negative coverage for possible healthcare associated coverage  -most recently admitted the hospital June 28 of this year  -doubtful for MRSA/agree with non initiation of vancomycin at this time  -f/u on this mornings procalcitonin > if negative will discontinue antibiotics     AGMA w/ Lactic acidosis in setting of DM2 w/ Hyperglycemia > Likely 2/2 DKA:  -resolved  -glucose under better control  -213 this AM      ANA LILIA:  -likely pre renal  -volume resucitation  -improving     Severe restrictive lung disease / obesity hypoventilation syndrome  -secondary to morbid obesity  -AM ABG  -continue HS bipap     Morbid obesity with BMI 40 1  -needs referral to weight management program  -advised dietary consult     Emphysematous COPD  -continue DuoNeb while here in hospital  -normally is on Trelegy in the outpatient setting  -patient is still pending complete pulmonary function test in the outpatient setting > should have performed 4 weeks s/p full recovery  -in setting of mild dyspnea > can initiate methylprednisolone 20 mg Q 12     Chronic mixed respiratory failure:  -continue BiPAP therapy 12/5 HS and naps  -continue 2 L nasal cannula as needed during the day  -titrate for oxygen saturation 88-92%  -continue 2 L nasal cannula at night  -check ABG in the morning     Right Basilar Atelectasis:  -continue bipap  -intitiate IS            ______________________________________________________________________    Subjective: Pt seen and examined at bedside  Complains of feeling tired today  [place] that he slept ok  Used bipap HS      Tele Events:     Vitals:   Temp:  [97 4 °F (36 3 °C)-99 °F (37 2 °C)] 98 3 °F (36 8 °C)  HR:  [] 98  Resp:  [16-23] 16  BP: (113-152)/(60-78) 121/70  FiO2 (%):  [35] 35  Weight (last 2 days)     Date/Time Weight    08/07/19 0226   127 (280)    08/06/19 2248   129 (284 39)            Oxygen Therapy  SpO2: 94 %  O2 Flow Rate (L/min): 2 5 L/min    IV Infusions:    multi-electrolyte 75 mL/hr Last Rate: 75 mL/hr (08/07/19 1756)       Nutrition:        Diet Orders   (From admission, onward)             Start     Ordered    08/07/19 0908  Room Service  Once     Question:  Type of Service  Answer:  Room Service-Appropriate    08/07/19 3662    08/07/19 0305  Diet Cam[place]/CHO Controlled; Consistent Carbohydrate Diet Level 3 (6 carb servings/90 grams CHO/meal)  Diet effective now     Question Answer Comment   Diet Type Campos/CHO Controlled    Campos/CHO Controlled Consistent Carbohydrate Diet Level 3 (6 carb servings/90 grams CHO/meal)    RD to adjust diet per protocol? Yes        08/07/19 0308                Ins/Outs:   I/O       08/06 0701 - 08/07 0700 08/07 0701 - 08/08 0700 08/08 0701 - 08/09 0700    P  O   225     Total Intake(mL/kg)  225 (1 8)     Urine (mL/kg/hr)  400 (0 1)     Total Output  400     Net  -175                  Lines/Drains:  Invasive Devices     Peripheral Intravenous Line            Peripheral IV 08/06/19 Left Antecubital 1 day                 Active medications:  Scheduled Meds:  Current Facility-Administered Medications:  ALPRAZolam 1 mg Oral HS PRN Jeri Pfeiffer DO    apixaban 5 mg Oral BID MANAS Luu    aspirin 81 mg Oral QAM MANAS Luu    busPIRone 15 mg Oral BID MANAS Luu    cefTRIAXone 1,000 mg Intravenous Q24H Sinan Simms DO Last Rate: 1,000 mg (08/08/19 9676)   fenofibrate 145 mg Oral QAM MANAS Luu    hydrocodone-chlorpheniramine polistirex 5 mL Oral Q12H PRN Rabia Ely MD    insulin glargine 45 Units Subcutaneous HS MANAS Luu    insulin lispro 1-6 Units Subcutaneous HS MANAS Luu    insulin lispro 17 Units Subcutaneous Daily With Lunch MANAS Luu    insulin lispro 2-12 Units Subcutaneous TID AC Karen Ch, MANAS    insulin lispro 20 Units Subcutaneous Daily Before Breakfast Karen Ch, CRNP    insulin lispro 22 Units Subcutaneous Daily With [address], CRNP    ipratropium-albuterol 3 mL Nebulization Q6H Karen Jing, BETTIENP    losartan 50 mg Oral BID Karen Shawon, CRNP    metoprolol succinate 200 mg Oral Daily Karen Jing, BETTIENP    multi-electrolyte 75 mL/hr Intravenous Continuous Rosa Good PA-C Last Rate: 75 mL/hr (08/07/19 1756)   ondansetron 4 mg Intravenous Q6H PRN Karengreyson Ch, MANAS    pantoprazole 40 mg Oral Early Morning Karen Jing, CRNP    pravastatin 40 mg Oral Daily With Dinner Karen Jing, MANAS    pregabalin 50 mg Oral TID Karen Jing, BETTIENP    QUEtiapine 200 mg Oral QAM Karen Jing, BETTIENP    QUEtiapine 400 mg Oral HS Karen Ch, BETTIENP    traMADol 50 mg Oral Q6H PRN Malgorzata Mantilla MD      PRN Meds:  ALPRAZolam 1 mg HS PRN   hydrocodone-chlorpheniramine polistirex 5 mL Q12H PRN   ondansetron 4 mg Q6H PRN   traMADol 50 mg Q6H PRN     ____________________________________________________________________      Physical Exam   Constitutional: He is oriented to person, place, and time  He appears well-developed  Morbidly obese body habitus   HENT:   Head: Normocephalic and atraumatic  Eyes: Pupils are equal, round, and reactive to light  Conjunctivae are normal    Neck: Normal range of motion  Neck supple  Cardiovascular: Normal rate, regular rhythm and normal heart sounds  Exam reveals no gallop and no friction rub  No murmur heard  Pulmonary/Chest: Effort normal  No accessory muscle usage  No tachypnea  No respiratory distress  He has decreased breath sounds in the right middle field, the right lower field, the left middle field and the left lower field  He has no wheezes  He has no rhonchi  He has no rales  He exhibits no tenderness  Currently on 3 L NC      Abdominal: Soft   Bowel sounds are normal    Musculoskeletal: Normal range of motion  He exhibits no edema  Right lower leg: He exhibits no edema  Left lower leg: He exhibits no edema  Neurological: He is alert and oriented to person, place, and time  Skin: Skin is warm and dry  Psychiatric: He has a normal mood and affect            ____________________________________________________________________    Labs:   CBC: Results from last 7 days   Lab Units 08/08/19  0443 08/07/19  0537 08/06/19  2313   WBC Thousand/uL 5 50 8 72 14 40*   HEMOGLOBIN g/dL 14 5 14 1 17 6*   HEMATOCRIT % 41 9 38 6 49 3   MCV fL 93 90 91   PLATELETS Thousands/uL 156 218 295     CMP: Results from last 7 days   Lab Units 08/08/19  0443 08/07/19  0537 08/06/19  2313   POTASSIUM mmol/L 4 7 4 2 4 9   CHLORIDE mmol/L 100 98* 96*   CO2 mmol/L 26 17* 21   BUN mg/dL 17 31* 39*   CREATININE mg/dL 0 81 0 94 1 51*   CALCIUM mg/dL 7 8* 9 2 10 7*   AST U/L  --  16 17   ALT U/L  --  35 40   ALK PHOS U/L  --  65 84   EGFR ml/min/1 73sq m 97 88 50     No components found for: ABG    Magnesium:   Results from last 7 days   Lab Units 08/06/19  2313   MAGNESIUM mg/dL 2 0     Phosphorous:     Troponin:   Results from last 7 days   Lab Units 08/06/19  2313   TROPONIN I ng/mL <0 02     PT/INR:   Results from last 7 days   Lab Units 08/06/19  2313   PTT seconds 24   INR  0 92     Lactic Acid:   Results from last 7 days   Lab Units 08/07/19  0159 08/06/19  2319   LACTIC ACID mmol/L 1 0 2 8*     BNP:   Results from last 7 days   Lab Units 08/06/19  2313   NT-PRO BNP pg/mL 323*     TSH:   Results from last 7 days   Lab Units 08/07/19  1032   TSH 3RD GENERATON uIU/mL 0 920     Procalcitonin: Invalid input(s): PROCALCITONIN      Imaging:   CT chest wo contrast   Final Result by Marcelle Dalton MD (08/07 3050)   1  Bibasilar linear opacities compatible with atelectasis versus scarring  Increased attenuation material at the peripheral left lung base (series 2 image 45) which appears new from CT dated 10/31/2018  Correlate for history of surgical intervention in    the interval  Alternatively this may represent sequela of aspiration  Clinical and laboratory correlation is recommended  2  Hepatic steatosis  Workstation performed: [id number]         XR chest 1 view portable   Final Result by Shlomo Srivastava MD (08/07 3099)      Cardiomegaly  CT scan recommended to exclude an abnormality at the right lung base obscuring the right hemidiaphragm  Workstation performed: [id number]               Micro: Lab Results   Component Value Date    BLOODCX No Growth After 5 Days  11/16/2018    BLOODCX Staphylococcus coagulase negative (A) 11/16/2018    BLOODCX No Growth After 5 Days  10/31/2018    URINECX 4216-9227 cfu/ml Mixed Contaminants X2 03/20/2017    URINECX No Growth <1000 cfu/mL 11/27/2016    URINECX No Growth <1000 cfu/mL 09/02/2016    SPUTUMCULTUR Test not performed  Suggest repeat specimen  03/21/2019    SPUTUMCULTUR Test not performed  Suggest repeat specimen   11/08/2017    SPUTUMCULTUR 1+ Growth of Staphylococcus aureus 03/20/2017    SPUTUMCULTUR 1+ Growth of Mixed Respiratory Francine 03/20/2017    MRSACULTURE  06/28/2019     No Methicillin Resistant Staphlyococcus aureus (MRSA) isolated            Invalid input(s): LEGIONELLAURINARYANTIGEN        Code Status: Level 1 - Full Code

## 2019-08-08 NOTE — ASSESSMENT & PLAN NOTE
Continue cpap at 10  Patient noted to have some bilateral wheezing and patient will be started on Solu-Medrol 20 milligram IV q 8 hours  Continue duo nebs, Tussionex  Patient is on trelegy at home

## 2019-08-08 NOTE — ASSESSMENT & PLAN NOTE
Patient with history of atrial fibrillation found to be in RVR in ER  He was given metoprolol and cardizem in the ER with improvement in heart rate   Secondary to worsening respiratory status   Patient was loaded with digoxin yesterday   Continue Toprol-XL 20 milligram p o  Daily and digoxin 250 microgram p o  Daily   2D echo showed EF of 40-45% with mild diffuse hypokinesis and mildly dilated left atrium-cardioversion was not attempted     Continue anticoagulation with Eliquis

## 2019-08-08 NOTE — PROGRESS NOTES
Progress Note - Cardiology   [facility]     Erna Sarah 60 y o  male MRN: [id number]  : 1960  Unit/Bed#: [address]- Encounter: [id number]    Assessment and Plan:   1  Severe restrictive lung disease with obesity hypoventilation syndrome rule out pneumonia:  Patient is followed by pulmonology  Continue on his IV Rocephin  2  Chronic atrial fibrillation with rapid ventricular response:  Patient noted to have a markedly dilated left atrium and was felt he would not have successful cardioversion  Plan is for rate control and anticoagulation  Patient started on IV digoxin load yesterday  Will transition to daily dose of oral digoxin starting tomorrow  Patient is currently on Toprol  mg once a day and Eliquis  Continue to monitor his telemetry  3  Hypertension:  Patient's blood pressure currently stable and improved with increased dose of Cozaar  Patient's renal function also stable  Will continue to monitor    4  Chronic systolic heart failure:  EF noted to be 40% on current echocardiogram   Continue patient's ARB and beta-blocker  Monitor I& O, daily weights and electrolytes    5  History of coronary artery disease    6  Morbid obesity    7  Diabetes with hemoglobin A1c of 8 3     Subjective / Objective:   Patient still with shortness of breath  Heart rates slowly improving with addition of the digoxin  Echocardiogram yesterday demonstrated ejection fraction visibly estimated at 40% with diffuse mild hypokinesis  Left atrium is markedly dilated measuring at 40 mL/m2 in area, patient also with right atrial dilatation and RV is at the upper size of normal     Vitals: Blood pressure 121/70, pulse 98, temperature 98 3 °F (36 8 °C), temperature source Tympanic, resp  rate 16, height 5' 10" (1 778 m), weight 127 kg (280 lb), SpO2 94 %  Vitals:    19 2248 19 0226   Weight: 129 kg (284 lb 6 3 oz) 127 kg (280 lb)     Body mass index is 40 18 kg/m²    BP Readings from Last 3 Encounters:   08/08/19 121/70   07/25/19 124/80   07/12/19 120/80     Orthostatic Blood Pressures      Most Recent Value   Blood Pressure  121/70 filed at 08/08/2019 0829   Patient Position - Orthostatic VS  Lying filed at 08/08/2019 0829        I/O       08/06 0701 - 08/07 0700 08/07 0701 - 08/08 0700 08/08 0701 - 08/09 0700    P  O   225     Total Intake(mL/kg)  225 (1 8)     Urine (mL/kg/hr)  400 (0 1)     Total Output  400     Net  -175                Invasive Devices     Peripheral Intravenous Line            Peripheral IV 08/06/19 Left Antecubital 1 day                  Intake/Output Summary (Last 24 hours) at 8/8/2019 1306  Last data filed at 8/7/2019 1401  Gross per 24 hour   Intake    Output 400 ml   Net -400 ml         Physical Exam:   Physical Exam   Constitutional: He is oriented to person, place, and time  He appears well-developed and well-nourished  No distress  HENT:   Head: Normocephalic and atraumatic  Right Ear: External ear normal    Left Ear: External ear normal    Eyes: Pupils are equal, round, and reactive to light  Conjunctivae are normal  Right eye exhibits no discharge  Left eye exhibits no discharge  No scleral icterus  Neck: Normal range of motion  Neck supple  No JVD present  No thyromegaly present  Cardiovascular: Intact distal pulses and normal pulses  An irregular rhythm present  Tachycardia present  Murmur heard  Systolic murmur is present with a grade of 1/6  Pulmonary/Chest: Effort normal  No accessory muscle usage  No respiratory distress  He has decreased breath sounds  He has no wheezes  Abdominal: Soft  Bowel sounds are normal  He exhibits no distension  Musculoskeletal: He exhibits edema  Appears chronic   Neurological: He is alert and oriented to person, place, and time  Skin: Skin is warm and dry  Capillary refill takes less than 2 seconds  He is not diaphoretic  Chronic venous stasis   Psychiatric: He has a normal mood and affect  Nursing note and vitals reviewed              Medications/ Allergies:     Current Facility-Administered Medications:  ALPRAZolam 1 mg Oral HS PRN Jeri Pfeiffer, DO    apixaban 5 mg Oral BID MANAS Li    aspirin 81 mg Oral QAM Rafa RosinMANAS    busPIRone 15 mg Oral BID Rafa RosinMANAS    cefTRIAXone 1,000 mg Intravenous Q24H Santi Dates, DO Last Rate: 1,000 mg (08/08/19 0833)   [START ON 8/9/2019] digoxin 250 mcg Oral Daily MANAS Marrero    fenofibrate 145 mg Oral QAM MANAS Li    hydrocodone-chlorpheniramine polistirex 5 mL Oral Q12H PRN Naveed Falk MD    insulin glargine 45 Units Subcutaneous HS MANAS Li    insulin lispro 1-6 Units Subcutaneous HS MANAS Li    insulin lispro 17 Units Subcutaneous Daily With Lunch MANAS Li    insulin lispro 2-12 Units Subcutaneous TID AC MANAS Li    insulin lispro 20 Units Subcutaneous Daily Before Breakfast MANAS Li    insulin lispro 22 Units Subcutaneous Daily With Dinner MANAS Li    ipratropium-albuterol 3 mL Nebulization Q6H MANAS Li    losartan 50 mg Oral BID Rafa RosMANAS griffin    metoprolol succinate 200 mg Oral Daily Arland RosinMANAS    ondansetron 4 mg Intravenous Q6H PRN Rafa Rosin, MANAS    pantoprazole 40 mg Oral Early Morning MANAS Li    pravastatin 40 mg Oral Daily With Dinner Rafa RosinMANAS    pregabalin 50 mg Oral TID Rafa RosinMANAS    QUEtiapine 200 mg Oral QAM Rafa Rosin, MANAS    QUEtiapine 400 mg Oral HS Arshayna RosinMANAS    traMADol 50 mg Oral Q6H PRN Naveed Falk MD        ALPRAZolam 1 mg HS PRN   hydrocodone-chlorpheniramine polistirex 5 mL Q12H PRN   ondansetron 4 mg Q6H PRN   traMADol 50 mg Q6H PRN     Allergies   Allergen Reactions    Wellbutrin [Bupropion] Other (See Comments)     Alteration with hearing and other senses       VTE Pharmacologic Prophylaxis:   Sequential compression device (Venodyne)     Labs:   Troponins:  Results from last 7 days   Lab Units 08/06/19  2313   TROPONIN I ng/mL <0 02     CBC with diff:  Results from last 7 days   Lab Units 08/08/19 0443 08/07/19  0537 08/06/19  2313   WBC Thousand/uL 5 50 8 72 14 40*   HEMOGLOBIN g/dL 14 5 14 1 17 6*   HEMATOCRIT % 41 9 38 6 49 3   MCV fL 93 90 91   PLATELETS Thousands/uL 156 218 295   MCH pg 32 2 32 9 32 4   MCHC g/dL 34 6 36 5 35 7   RDW % 13 2 13 0 13 2   MPV fL 10 5 10 5 10 5   NRBC AUTO /100 WBCs 0  --  1     CMP:  Results from last 7 days   Lab Units 08/08/19 0443 08/07/19 0537 08/06/19  2313   SODIUM mmol/L 135* 133* 132*   POTASSIUM mmol/L 4 7 4 2 4 9   CHLORIDE mmol/L 100 98* 96*   CO2 mmol/L 26 17* 21   ANION GAP mmol/L 9 18* 15*   BUN mg/dL 17 31* 39*   CREATININE mg/dL 0 81 0 94 1 51*   CALCIUM mg/dL 7 8* 9 2 10 7*   AST U/L  --  16 17   ALT U/L  --  35 40   ALK PHOS U/L  --  65 84   TOTAL PROTEIN g/dL  --  6 4 7 3   ALBUMIN g/dL  --  3 4* 3 8   TOTAL BILIRUBIN mg/dL  --  0 30 0 30   EGFR ml/min/1 73sq m 97 88 50     Magnesium:  Results from last 7 days   Lab Units 08/06/19  2313   MAGNESIUM mg/dL 2 0     Coags:  Results from last 7 days   Lab Units 08/06/19  2313   PTT seconds 24   INR  0 92     TSH:  Results from last 7 days   Lab Units 08/07/19  1032   TSH 3RD GENERATON uIU/mL 0 920     NT-proBNP:   Recent Labs     08/06/19  2313   NTBNP 323*        Imaging & Testing   I have personally reviewed pertinent reports  Xr Chest 1 View Portable    Result Date: 8/7/2019  Narrative: CHEST INDICATION:   sob  COMPARISON:  06/28/2019 EXAM PERFORMED/VIEWS:  XR CHEST PORTABLE 1 image FINDINGS: Cardiomediastinal silhouette appears enlarged  The pulmonary vessels are normal  The left lung is clear  There is obscuration of the right hemidiaphragm and increased density at the right lung base    Although possibly related to a pericardial fat pad, CT scan of the chest recommended to exclude an abnormality at the right lung base  No pneumothorax or pleural effusion  Osseous structures appear within normal limits for patient age  Impression: Cardiomegaly  CT scan recommended to exclude an abnormality at the right lung base obscuring the right hemidiaphragm  Workstation performed: [id number]     Ct Chest Wo Contrast    Result Date: 8/7/2019  Narrative: CT CHEST WITHOUT IV CONTRAST INDICATION:   Cough, persistent Shortness of breath  COMPARISON:  Chest radiograph 8/6/2019, chest CT 10/31/2018 TECHNIQUE: CT examination of the chest was performed without intravenous contrast   Axial, sagittal, and coronal 2D reformatted images were created from the source data and submitted for interpretation  Radiation dose length product (DLP) for this visit:  1332 mGy-cm   This examination, like all CT scans performed in the [facility], was performed utilizing techniques to minimize radiation dose exposure, including the use of iterative reconstruction and automated exposure control  FINDINGS: LUNGS:  There are linear opacities at the bilateral lung bases  Focal area of increased attenuation at the left lung base (series 601 image 147)  Patchy opacity in the peripheral right middle lobe likely reflecting atelectasis  No endobronchial lesion  PLEURA:  Unremarkable  HEART/GREAT VESSELS:  Unremarkable for patient's age  MEDIASTINUM AND CHRISTOPHER:  Unremarkable  CHEST WALL AND LOWER NECK:   Unremarkable  VISUALIZED STRUCTURES IN THE UPPER ABDOMEN:  Visualized hepatic parenchyma is diffusely decreased in density consistent with hepatic steatosis  Status post cholecystectomy  Otherwise no clinical significant abnormality identified in the visualized upper abdomen  OSSEOUS STRUCTURES:  No acute fracture or destructive osseous lesion  Impression: 1  Bibasilar linear opacities compatible with atelectasis versus scarring   Increased attenuation material at the peripheral left lung base (series 2 image 45) which appears new from CT dated 10/31/2018  Correlate for history of surgical intervention in the interval  Alternatively this may represent sequela of aspiration  Clinical and laboratory correlation is recommended  2  Hepatic steatosis  Workstation performed: [id number]        EKG / Monitor: Personally reviewed  Atrial fibrillation:  Ventricular response still elevated, slowly improving with addition of digoxin load  Cardiac testing:   Results for orders placed during the hospital encounter of 19   Echo complete with contrast if indicated    Narrative [address]  [address] [facility] [place] [address]  [phone]    Transthoracic Echocardiogram  2D, M-mode, Doppler, and Color Doppler    Study date:  07-Aug-2019    Patient: Alysha Jones  MR number: [id number]  Account number: [de-identified]  : 1960  Age: 60 years  Gender: Male  Status: Inpatient  Location: Bedside  Height: 70 in  Weight: 279 4 lb  BP: 113/ 68 mmHg    Indications: Pulmonary HTN    Diagnoses: I27 9 - Pulmonary heart disease, unspecified    Sonographer:  MAIDA Florian  Primary Physician:  Griselda Ravel, MD  Referring Physician:  MANAS Jorge  Group:  [facility]  Interpreting Physician:  Shell Cisneros MD    SUMMARY    LEFT VENTRICLE:  Systolic function was moderately reduced  Ejection fraction was estimated in the range of 40 % to 45 % to be 40 %  There was mild diffuse hypokinesis  Wall thickness was mildly increased  RIGHT VENTRICLE:  The size was at the upper limits of normal     LEFT ATRIUM:  The atrium was markedly dilated measuring 40 ml/m2 in area    RIGHT ATRIUM:  The atrium was mildly dilated  MITRAL VALVE:  There was trace regurgitation  TRICUSPID VALVE:  Unable to obtain adequate TR wave form even with saline injection to estimate pulmonary pressures  There was trace regurgitation      HISTORY: PRIOR HISTORY: Pneumonia,Bipolar,chronic kidney Disease,obstructive sleep apnea,COPD,CAD,HTN,Respiratory failure,A Fib ,Diabetes  PROCEDURE: The procedure was performed at the bedside  This was a routine study  The transthoracic approach was used  The study included complete 2D imaging, M-mode, complete spectral Doppler, and color Doppler  The heart rate was 104 bpm,  at the start of the study  Intravenous contrast (agitated saline) was administered to enhance Doppler signals  Image quality was adequate  LEFT VENTRICLE: Size was normal  Systolic function was moderately reduced  Ejection fraction was estimated in the range of 40 % to 45 % to be 40 %  There was mild diffuse hypokinesis  Wall thickness was mildly increased  No evidence of  apical thrombus  DOPPLER: Left ventricular diastolic function parameters were abnormal     RIGHT VENTRICLE: The size was at the upper limits of normal  Systolic function was normal  Wall thickness was normal     LEFT ATRIUM: The atrium was markedly dilated measuring 40 ml/m2 in area    RIGHT ATRIUM: The atrium was mildly dilated  MITRAL VALVE: Valve structure was normal  There was normal leaflet separation  DOPPLER: The transmitral velocity was within the normal range  There was no evidence for stenosis  There was trace regurgitation  AORTIC VALVE: The valve was trileaflet  Leaflets exhibited mildly increased thickness and normal cuspal separation  DOPPLER: Transaortic velocity was within the normal range  There was no evidence for stenosis  There was no significant  regurgitation  TRICUSPID VALVE: Unable to obtain adequate TR wave form even with saline injection to estimate pulmonary pressures The valve structure was normal  There was normal leaflet separation  DOPPLER: The transtricuspid velocity was within the  normal range  There was no evidence for stenosis  There was trace regurgitation      PULMONIC VALVE: Leaflets exhibited normal thickness, no calcification, and normal cuspal separation  DOPPLER: The transpulmonic velocity was within the normal range  There was no significant regurgitation  PERICARDIUM: There was no pericardial effusion  The pericardium was normal in appearance  AORTA: The root exhibited normal size  SYSTEMIC VEINS: IVC: The inferior vena cava was normal in size  SYSTEM MEASUREMENT TABLES    2D mode  AoR Diam 2D: 3 7 cm  LA Diam (2D): 5 cm  LA/Ao (2D): 1 35  FS (2D Teich): 16 5 %  IVSd (2D): 1 14 cm  LVDEV: 98 3 cmï¾³  LVEDV MOD BP: 154 cmï¾³  LVESV: 64 3 cmï¾³  LVIDd(2D): 4 62 cm  LVISd (2D): 3 86 cm  LVOT Area 2D: 3 8 cmï¾²  LVPWd (2D): 1 17 cm  SV (Teich): 34 cmï¾³    Apical four chamber  LVEF A4C: 33 %    Apical two chamber  LVEF A2C: 38 %    Unspecified Scan Mode  DANIELLE Cont Eq (Peak John): 2 98 cmï¾²  LVOT Diam : 2 cm  LVOT Vmax: 1200 mm/s  LVOT Vmax; Mean: 1200 mm/s  Peak Grad ; Mean: 6 mm[Hg]  MV Peak E John  Mean: 864 mm/s  MVA (PHT): 5 cmï¾²  PHT: 44 ms  Max P mm[Hg]  V Max: 3490 mm/s  Vmax: 3280 mm/s  RA Area: 27 8 cmï¾²  RA Volume: 92 cmï¾³  TAPSE: 2 cm    [facility] Accredited Echocardiography Laboratory    Prepared and electronically signed by    Deacon Alexandre MD  Signed 08-Aug-2019 12:05:31         Soraya Brandt        "This note has been constructed using a voice recognition system  Therefore there may be syntax, spelling, and/or grammatical errors   Please call if you have any questions  "

## 2019-08-08 NOTE — ASSESSMENT & PLAN NOTE
Patient presented with worsening shortness of breath  Initially patient was admitted hospital for possible pneumonia of the right lower lobe as patient had tachypnea, tachycardia with elevated white count and elevated lactic acid level  Patient's procalcitonin level x2 was negative  Patient has been on IV cefepime  Possible transition to p o  Antibiotics in the next 24 hours

## 2019-08-08 NOTE — PROGRESS NOTES
Progress Note Gurjit Golden 1960, 60 y o  male MRN: [id number]    Unit/Bed#: [address] [address] Encounter: [id number]    Primary Care Provider: Loi Prieto MD   Date and time admitted to hospital: 8/6/2019 10:46 PM        * Acute bronchitis  Assessment & Plan  Patient presented with worsening shortness of breath  Initially patient was admitted hospital for possible pneumonia of the right lower lobe as patient had tachypnea, tachycardia with elevated white count and elevated lactic acid level  Patient's procalcitonin level x2 was negative  Patient has been on IV cefepime  Possible transition to p o  Antibiotics in the next 24 hours  Atrial fibrillation with RVR [facility])  Assessment & Plan  Patient with history of atrial fibrillation found to be in RVR in ER  He was given metoprolol and cardizem in the ER with improvement in heart rate   Secondary to worsening respiratory status   Patient was loaded with digoxin yesterday   Continue Toprol-XL 20 milligram p o  Daily and digoxin 250 microgram p o  Daily   2D echo showed EF of 40-45% with mild diffuse hypokinesis and mildly dilated left atrium-cardioversion was not attempted   Continue anticoagulation with Eliquis    Acute on chronic respiratory failure with hypoxia (HCC)  Assessment & Plan  Continue cpap QHS and continue oxygen supplementation at 2 liters/minute  Component could be related to acute bronchitis and COPD exacerbation  Started on Solu-Medrol 20 milligram IV q 8 hours  Continue nebulizers and antibiotics    SHAVONNE and COPD overlap syndrome (HCC)  Assessment & Plan  Continue cpap at 10  Patient noted to have some bilateral wheezing and patient will be started on Solu-Medrol 20 milligram IV q 8 hours  Continue duo Shakeel gonzalezx  Patient is on trelegy at home      Type 2 diabetes mellitus with complication, with long-term current use of insulin [facility])  Assessment & Plan  Lab Results   Component Value Date    HGBA1C 8 3 (H) 04/19/2019 Recent Labs     198 19  0709 19  1050 19  1621   POCGLU 231* 213* 318* 165*    Metformin is on hold due to lactic acidosis  Patient initially had elevated anion gap with decreased bicarbonate-thought to have a component of DKA  Labs are all normal today  Continue Lantus 45 units subcutaneously daily with Humalog sliding scale  Continue Humalog with meals-20 units with breakfast, 7 units with lunch and 22 units with dinner  Monitor blood sugars closely as patient is started on steroids today    Benign essential hypertension  Assessment & Plan  Continue cozaar and metoprolol    Esophageal reflux  Assessment & Plan  Continue PPI    CAD (coronary artery disease)  Assessment & Plan  Continue aspirin, statin, metoprolol    Bipolar affective disorder (HCC)  Assessment & Plan  Continue buspar, seroquel and ambien    Acute renal failure superimposed on stage 2 chronic kidney disease ([facility] [address] )  Assessment & Plan  Cr elevated at 1 51, baseline appears around 1 3  Improved with IV hydration  Creatinine back to baseline  VTE Pharmacologic Prophylaxis:   Pharmacologic: Apixaban (Eliquis)  Mechanical VTE Prophylaxis in Place: Yes    Patient Centered Rounds: I have performed bedside rounds with nursing staff today  Discussions with Specialists or Other Care Team Provider: Vaishali Good  Education and Discussions with Family / Patient:Yes  Time Spent for Care: 45 minutes  More than 50% of total time spent on counseling and coordination of care as described above  Current Length of Stay: 1 day(s)  Current Patient Status: Inpatient     Discharge Plan: STR    Code Status: Level 1 - Full Code      Subjective:   Patient is still having pain with coughing  Slightly improved shortness of breath    Patient denies any abdominal pain nausea or vomiting      Objective:     Vitals:   Temp (24hrs), Av 8 °F (37 1 °C), Min:98 3 °F (36 8 °C), Max:99 4 °F (37 4 °C)    Temp:  [98 3 °F (36 8 °C)-99 4 °F (37 4 °C)] 99 4 °F (37 4 °C)  HR:  [] 100  Resp:  [16-23] 16  BP: (112-152)/(60-70) 112/70  SpO2:  [93 %-97 %] 93 %  Body mass index is 40 18 kg/m²  Input and Output Summary (last 24 hours):   No intake or output data in the 24 hours ending 08/08/19 1644     Physical Exam:     Physical Exam   Constitutional: No distress  HENT:   Head: Normocephalic and atraumatic  Eyes: Pupils are equal, round, and reactive to light  Conjunctivae are normal    Neck: Normal range of motion  Neck supple  Cardiovascular: Normal rate, regular rhythm and normal heart sounds  Pulmonary/Chest: Effort normal  No respiratory distress  He has wheezes  He has no rhonchi  He has no rales  He exhibits no tenderness  Few scattered wheezes   Abdominal: Soft  Bowel sounds are normal  He exhibits no distension  There is no tenderness  There is no rebound and no guarding  Musculoskeletal: He exhibits no edema  Neurological: He is alert  No cranial nerve deficit  Skin: Skin is warm and dry  No rash noted         Additional Data:     Labs:    Results from last 7 days   Lab Units 08/08/19 0443 08/07/19  0537 08/06/19  2313   WBC Thousand/uL 5 50 8 72 14 40*   HEMOGLOBIN g/dL 14 5 14 1 17 6*   HEMATOCRIT % 41 9 38 6 49 3   PLATELETS Thousands/uL 156 218 295   NEUTROS PCT % 46  --   --      Results from last 7 days   Lab Units 08/08/19 0443 08/07/19  0537 08/06/19  2313   SODIUM mmol/L 135* 133* 132*   POTASSIUM mmol/L 4 7 4 2 4 9   CHLORIDE mmol/L 100 98* 96*   CO2 mmol/L 26 17* 21   BUN mg/dL 17 31* 39*   CREATININE mg/dL 0 81 0 94 1 51*   CALCIUM mg/dL 7 8* 9 2 10 7*   TOTAL BILIRUBIN mg/dL  --  0 30 0 30   ALK PHOS U/L  --  65 84   ALT U/L  --  35 40   AST U/L  --  16 17     Results from last 7 days   Lab Units 08/06/19  2313   INR  0 92     Results from last 7 days   Lab Units 08/06/19  2313   TROPONIN I ng/mL <0 02     Lab Results   Component Value Date/Time    HGBA1C 8 3 (H) 04/19/2019 10:19 AM    HGBA1C 8 0 07/21/2017 09:27 AM     Results from last 7 days   Lab Units 08/08/19  1621 08/08/19  1050 08/08/19  0709 08/07/19  2028 08/07/19  1647 08/07/19  1110 08/07/19  0709   POC GLUCOSE mg/dl 165* 318* 213* 231* 173* 151* 208*     Results from last 7 days   Lab Units 08/08/19  0443 08/07/19  1032 08/07/19  0159 08/06/19  2319   LACTIC ACID mmol/L  --   --  1 0 2 8*   PROCALCITONIN ng/ml 0 06 0 07  --   --        * I Have Reviewed All Lab Data Listed Above  * Additional Pertinent Lab Tests Reviewed: [address] Admission Reviewed    Imaging:     CT chest wo contrast   Final Result by Cody Del Toro MD (08/07 9380)   1  Bibasilar linear opacities compatible with atelectasis versus scarring  Increased attenuation material at the peripheral left lung base (series 2 image 45) which appears new from CT dated 10/31/2018  Correlate for history of surgical intervention in    the interval  Alternatively this may represent sequela of aspiration  Clinical and laboratory correlation is recommended  2  Hepatic steatosis  Workstation performed: [id number]         XR chest 1 view portable   Final Result by Moody Armando MD (08/07 8869)      Cardiomegaly  CT scan recommended to exclude an abnormality at the right lung base obscuring the right hemidiaphragm  Workstation performed: [id number]           Imaging Reports Reviewed by myself    Cultures:   Blood Culture:   Lab Results   Component Value Date    BLOODCX No Growth at 24 hrs  08/06/2019    BLOODCX No Growth at 24 hrs  08/06/2019    BLOODCX No Growth After 5 Days  11/16/2018    BLOODCX Staphylococcus coagulase negative (A) 11/16/2018    BLOODCX No Growth After 5 Days  10/31/2018    BLOODCX No Growth After 5 Days   10/31/2018     Urine Culture:   Lab Results   Component Value Date    URINECX 8304-1814 cfu/ml Mixed Contaminants X2 03/20/2017    URINECX No Growth <1000 cfu/mL 11/27/2016    URINECX No Growth <1000 cfu/mL 09/02/2016     Sputum Culture: No components found for: SPUTUMCX  Wound Culture: No results found for: WOUNDCULT    Last 24 Hours Medication List:     Current Facility-Administered Medications:  ALPRAZolam 1 mg Oral HS PRN Jeri Pfeiffer DO    apixaban 5 mg Oral BID [facility], CRNP    aspirin [facility]    busPIRone 15 mg Oral BID [facility], CRNP    cefTRIAXone 1,000 mg Intravenous Q24H Samantha Ramirez DO Last Rate: 1,000 mg (08/08/19 0833)   [START ON 8/9/2019] digoxin 250 mcg Oral Daily Lovena Six, CRNP    fenofibrate 145 mg [facility], CR[facility]    hydrocodone-chlorpheniramine polistirex 5 mL Oral Q12H PRN Ramakrishna Ortiz MD    insulin glargine 45 Units Subcutaneous HS [facility], CRNP    insulin lispro 1-6 Units Subcutaneous [facility], CRNP    insulin lispro 17 Units Subcutaneous Daily With Lunch [facility], CRNP    insulin lispro 2-12 Units Subcutaneous TID [facility], CRNP    insulin lispro 20 Units Subcutaneous Daily Before Breakfast [facility], CRNP    insulin lispro 22 Units Subcutaneous Daily With Dinner [facility], CRNP    ipratropium-albuterol 3 mL Nebulization Q6H [facility], CRNP    losartan 50 mg Oral BID [facility], CRNP    methylPREDNISolone sodium succinate 20 mg Intravenous TID Ramakrishna Ortiz MD    metoprolol succinate 200 mg Oral Daily [facility], CRNP    ondansetron 4 mg Intravenous Q6H PRN [facility], CRNP    pantoprazole 40 mg Oral Early Morning [facility], CRNP    pravastatin 40 mg Oral Daily With Dinner [facility], CRNP    pregabalin 50 mg Oral TID [facility], CRNP    QUEtiapine 200 mg Oral [facility], CRNP    QUEtiapine 400 mg Oral HS [facility], CRNP    traMADol 50 mg Oral Q6H PRN Ramakrishna Ortiz MD         Today, Patient Was Seen By: Ramakrishna Ortiz MD    ** Please Note: Dragon 360 Dictation voice to text software may have been used in the creation of this document   **

## 2019-08-08 NOTE — ASSESSMENT & PLAN NOTE
Cr elevated at 1 51, baseline appears around 1 3  Improved with IV hydration  Creatinine back to baseline

## 2019-08-08 NOTE — ASSESSMENT & PLAN NOTE
Lab Results   Component Value Date    HGBA1C 8 3 (H) 04/19/2019       Recent Labs     08/07/19  2028 08/08/19  0709 08/08/19  1050 08/08/19  1621   POCGLU 231* 213* 318* 165*    Metformin is on hold due to lactic acidosis  Patient initially had elevated anion gap with decreased bicarbonate-thought to have a component of DKA  Labs are all normal today  Continue Lantus 45 units subcutaneously daily with Humalog sliding scale    Continue Humalog with meals-20 units with breakfast, 7 units with lunch and 22 units with dinner  Monitor blood sugars closely as patient is started on steroids today

## 2019-08-08 NOTE — SOCIAL WORK
A post acute care recommendation was made by your care team for short term rehab  CM discussed the freedom of choice with patient  CM provided skilled facility list, given to patient via printed packet  Patient is aware the list is custom filtered by patient's zip code and that [address] post acute providers are designated  Pt has been to [facility] and wishes that to be his first choice  [id number] is his second option, and [facility]  Completed PASSR  Referrals made  Unsure of d/c date

## 2019-08-09 LAB
ARTERIAL PATENCY WRIST A: YES
BASE EXCESS BLDA CALC-SCNC: 3.7 MMOL/L
BODY TEMPERATURE: 99.8 DEGREES FEHRENHEIT
GLUCOSE SERPL-MCNC: 250 MG/DL (ref 65–140)
GLUCOSE SERPL-MCNC: 261 MG/DL (ref 65–140)
GLUCOSE SERPL-MCNC: 343 MG/DL (ref 65–140)
GLUCOSE SERPL-MCNC: 384 MG/DL (ref 65–140)
HCO3 BLDA-SCNC: 29 MMOL/L (ref 22–28)
IPAP: 12
MRSA NOSE QL CULT: NORMAL
NON VENT- BIPAP: ABNORMAL
O2 CT BLDA-SCNC: 20.1 ML/DL (ref 16–23)
OXYHGB MFR BLDA: 95 % (ref 94–97)
PCO2 BLDA: 46.3 MM HG (ref 36–44)
PCO2 TEMP ADJ BLDA: 47.7 MM HG (ref 36–44)
PEEP MAX SETTING VENT: 5 CM[H2O]
PH BLD: 7.41 [PH] (ref 7.35–7.45)
PH BLDA: 7.42 [PH] (ref 7.35–7.45)
PO2 BLD: 94.1 MM HG (ref 75–129)
PO2 BLDA: 90 MM HG (ref 75–129)
SPECIMEN SOURCE: ABNORMAL
VENT BIPAP FIO2: 35 %

## 2019-08-09 PROCEDURE — 94760 N-INVAS EAR/PLS OXIMETRY 1: CPT

## 2019-08-09 PROCEDURE — 94660 CPAP INITIATION&MGMT: CPT

## 2019-08-09 PROCEDURE — 99232 SBSQ HOSP IP/OBS MODERATE 35: CPT | Performed by: INTERNAL MEDICINE

## 2019-08-09 PROCEDURE — 82948 REAGENT STRIP/BLOOD GLUCOSE: CPT

## 2019-08-09 PROCEDURE — 94640 AIRWAY INHALATION TREATMENT: CPT

## 2019-08-09 PROCEDURE — 82805 BLOOD GASES W/O2 SATURATION: CPT | Performed by: PHYSICIAN ASSISTANT

## 2019-08-09 RX ORDER — CEFUROXIME AXETIL 250 MG/1
500 TABLET ORAL EVERY 12 HOURS SCHEDULED
Status: DISCONTINUED | OUTPATIENT
Start: 2019-08-10 | End: 2019-08-10 | Stop reason: HOSPADM

## 2019-08-09 RX ORDER — SODIUM CHLORIDE FOR INHALATION 0.9 %
3 VIAL, NEBULIZER (ML) INHALATION
Status: DISCONTINUED | OUTPATIENT
Start: 2019-08-09 | End: 2019-08-10 | Stop reason: HOSPADM

## 2019-08-09 RX ORDER — SODIUM CHLORIDE FOR INHALATION 0.9 %
3 VIAL, NEBULIZER (ML) INHALATION
Status: DISCONTINUED | OUTPATIENT
Start: 2019-08-09 | End: 2019-08-09

## 2019-08-09 RX ORDER — LEVALBUTEROL 1.25 MG/.5ML
1.25 SOLUTION, CONCENTRATE RESPIRATORY (INHALATION)
Status: DISCONTINUED | OUTPATIENT
Start: 2019-08-09 | End: 2019-08-10 | Stop reason: HOSPADM

## 2019-08-09 RX ORDER — LEVALBUTEROL 1.25 MG/.5ML
1.25 SOLUTION, CONCENTRATE RESPIRATORY (INHALATION)
Status: DISCONTINUED | OUTPATIENT
Start: 2019-08-09 | End: 2019-08-09

## 2019-08-09 RX ADMIN — QUETIAPINE FUMARATE 400 MG: 200 TABLET, EXTENDED RELEASE ORAL at 21:55

## 2019-08-09 RX ADMIN — METHYLPREDNISOLONE SODIUM SUCCINATE 20 MG: 40 INJECTION, POWDER, FOR SOLUTION INTRAMUSCULAR; INTRAVENOUS at 21:56

## 2019-08-09 RX ADMIN — CEFTRIAXONE 1000 MG: 1 INJECTION, SOLUTION INTRAVENOUS at 08:18

## 2019-08-09 RX ADMIN — IPRATROPIUM BROMIDE AND ALBUTEROL SULFATE 3 ML: 2.5; .5 SOLUTION RESPIRATORY (INHALATION) at 01:01

## 2019-08-09 RX ADMIN — INSULIN LISPRO 17 UNITS: 100 INJECTION, SOLUTION INTRAVENOUS; SUBCUTANEOUS at 11:20

## 2019-08-09 RX ADMIN — TRAMADOL HYDROCHLORIDE 50 MG: 50 TABLET, FILM COATED ORAL at 16:24

## 2019-08-09 RX ADMIN — BUSPIRONE HYDROCHLORIDE 15 MG: 10 TABLET ORAL at 08:19

## 2019-08-09 RX ADMIN — METHYLPREDNISOLONE SODIUM SUCCINATE 20 MG: 40 INJECTION, POWDER, FOR SOLUTION INTRAMUSCULAR; INTRAVENOUS at 08:20

## 2019-08-09 RX ADMIN — ALPRAZOLAM 1 MG: 0.5 TABLET ORAL at 22:02

## 2019-08-09 RX ADMIN — HYDROCODONE POLISTIREX AND CHLORPHENIRAMINE POLISTIREX 5 ML: 10; 8 SUSPENSION, EXTENDED RELEASE ORAL at 16:22

## 2019-08-09 RX ADMIN — ASPIRIN 81 MG 81 MG: 81 TABLET ORAL at 08:19

## 2019-08-09 RX ADMIN — ISODIUM CHLORIDE 3 ML: 0.03 SOLUTION RESPIRATORY (INHALATION) at 19:30

## 2019-08-09 RX ADMIN — LEVALBUTEROL 1.25 MG: 1.25 SOLUTION, CONCENTRATE RESPIRATORY (INHALATION) at 11:29

## 2019-08-09 RX ADMIN — METHYLPREDNISOLONE SODIUM SUCCINATE 20 MG: 40 INJECTION, POWDER, FOR SOLUTION INTRAMUSCULAR; INTRAVENOUS at 16:25

## 2019-08-09 RX ADMIN — PANTOPRAZOLE SODIUM 40 MG: 40 TABLET, DELAYED RELEASE ORAL at 06:34

## 2019-08-09 RX ADMIN — LOSARTAN POTASSIUM 50 MG: 50 TABLET ORAL at 08:19

## 2019-08-09 RX ADMIN — APIXABAN 5 MG: 5 TABLET, FILM COATED ORAL at 17:33

## 2019-08-09 RX ADMIN — INSULIN LISPRO 22 UNITS: 100 INJECTION, SOLUTION INTRAVENOUS; SUBCUTANEOUS at 16:26

## 2019-08-09 RX ADMIN — PRAVASTATIN SODIUM 40 MG: 40 TABLET ORAL at 16:24

## 2019-08-09 RX ADMIN — LEVALBUTEROL 1.25 MG: 1.25 SOLUTION, CONCENTRATE RESPIRATORY (INHALATION) at 19:30

## 2019-08-09 RX ADMIN — FENOFIBRATE 145 MG: 145 TABLET, COATED ORAL at 08:19

## 2019-08-09 RX ADMIN — INSULIN LISPRO 6 UNITS: 100 INJECTION, SOLUTION INTRAVENOUS; SUBCUTANEOUS at 16:26

## 2019-08-09 RX ADMIN — INSULIN LISPRO 6 UNITS: 100 INJECTION, SOLUTION INTRAVENOUS; SUBCUTANEOUS at 08:21

## 2019-08-09 RX ADMIN — PREGABALIN 50 MG: 50 CAPSULE ORAL at 08:18

## 2019-08-09 RX ADMIN — PREGABALIN 50 MG: 50 CAPSULE ORAL at 21:54

## 2019-08-09 RX ADMIN — INSULIN GLARGINE 45 UNITS: 100 INJECTION, SOLUTION SUBCUTANEOUS at 22:00

## 2019-08-09 RX ADMIN — DIGOXIN 250 MCG: 125 TABLET ORAL at 08:18

## 2019-08-09 RX ADMIN — INSULIN LISPRO 8 UNITS: 100 INJECTION, SOLUTION INTRAVENOUS; SUBCUTANEOUS at 11:19

## 2019-08-09 RX ADMIN — INSULIN LISPRO 20 UNITS: 100 INJECTION, SOLUTION INTRAVENOUS; SUBCUTANEOUS at 08:20

## 2019-08-09 RX ADMIN — QUETIAPINE FUMARATE 200 MG: 200 TABLET, EXTENDED RELEASE ORAL at 08:19

## 2019-08-09 RX ADMIN — LOSARTAN POTASSIUM 50 MG: 50 TABLET ORAL at 17:33

## 2019-08-09 RX ADMIN — ISODIUM CHLORIDE 3 ML: 0.03 SOLUTION RESPIRATORY (INHALATION) at 11:29

## 2019-08-09 RX ADMIN — IPRATROPIUM BROMIDE AND ALBUTEROL SULFATE 3 ML: 2.5; .5 SOLUTION RESPIRATORY (INHALATION) at 07:13

## 2019-08-09 RX ADMIN — PREGABALIN 50 MG: 50 CAPSULE ORAL at 16:25

## 2019-08-09 RX ADMIN — INSULIN LISPRO 6 UNITS: 100 INJECTION, SOLUTION INTRAVENOUS; SUBCUTANEOUS at 21:59

## 2019-08-09 RX ADMIN — METOPROLOL SUCCINATE 200 MG: 100 TABLET, EXTENDED RELEASE ORAL at 08:19

## 2019-08-09 RX ADMIN — APIXABAN 5 MG: 5 TABLET, FILM COATED ORAL at 08:19

## 2019-08-09 RX ADMIN — BUSPIRONE HYDROCHLORIDE 15 MG: 10 TABLET ORAL at 17:33

## 2019-08-09 NOTE — PROGRESS NOTES
Progress Note Dale Husain 1960, 60 y o  male MRN: [id number]    Unit/Bed#: [address] [address] Encounter: [id number]    Primary Care Provider: Ramesh Mcmahan MD   Date and time admitted to hospital: 8/6/2019 10:46 PM        * Acute bronchitis  Assessment & Plan  Patient presented with worsening shortness of breath  Initially patient was admitted hospital for possible pneumonia of the right lower lobe as patient had tachypnea, tachycardia with elevated white count and elevated lactic acid level  Patient's procalcitonin level x2 was negative  Patient was initially IV cefepime and later on IV Rocephin  Patient will be transitioned to Ceftin 500 milligram p o  B i d  For 2 days  Blood cultures have been negative  Urine for Legionella and streptococcal antigens have been negative    Atrial fibrillation with RVR [facility])  Assessment & Plan  Patient with history of atrial fibrillation found to be in RVR in ER  He was given metoprolol and cardizem in the ER with improvement in heart rate   Secondary to worsening respiratory status   Patient was loaded with digoxin    Continue Toprol- milligram p o  Daily and digoxin 250 microgram p o  Daily   2D echo showed EF of 40-45% with mild diffuse hypokinesis and mildly dilated left atrium-cardioversion was not attempted   Continue anticoagulation with Eliquis   Heart rate is still labile    Acute on chronic respiratory failure with hypoxia (HCC)  Assessment & Plan  Continue cpap QHS and continue oxygen supplementation at 2 liters/minute  Component could be related to acute bronchitis and COPD exacerbation  Started on Solu-Medrol 20 milligram IV q 8 hours  Continue nebulizers and antibiotics  ABG showed a pH of 7 4, pCO2 47 and PO2 of 90% on BiPAP      SHAVONNE and COPD overlap syndrome (HCC)  Assessment & Plan  Continue cpap at 10  Patient noted to have some bilateral wheezing and patient will be started on Solu-Medrol 20 milligram IV q 8 hours  Possible transition to p o  Steroids  Continue duo nebs, Tussionex  Patient is on trelegy at home  Acute renal failure superimposed on stage 2 chronic kidney disease ([facility] [address] )  Assessment & Plan  Cr elevated at 1 51, baseline appears around 1 3  Improved with IV hydration  Creatinine back to baseline  Type 2 diabetes mellitus with complication, with long-term current use of insulin [facility])  Assessment & Plan  Lab Results   Component Value Date    HGBA1C 8 3 (H) 04/19/2019       Recent Labs     08/08/19  1621 08/08/19  2032 08/09/19  0701 08/09/19  1052   POCGLU 165* 181* 250* 343*    Metformin is on hold due to lactic acidosis  Patient initially had elevated anion gap with decreased bicarbonate-thought to have a component of DKA  Labs are all normal today  Continue Lantus 45 units subcutaneously daily with Humalog sliding scale  Continue Humalog with meals-20 units with breakfast, 7 units with lunch and 22 units with dinner  Elevated blood sugar secondary to steroids    Benign essential hypertension  Assessment & Plan  Continue cozaar and metoprolol    Morbid obesity ([email or web address] )  Assessment & Plan  Dietary and lifestyle modifications    Esophageal reflux  Assessment & Plan  Continue PPI    CAD (coronary artery disease)  Assessment & Plan  Continue aspirin, statin, metoprolol    Bipolar affective disorder (HCC)  Assessment & Plan  Continue buspar, seroquel and ambien      VTE Pharmacologic Prophylaxis:   Pharmacologic: Apixaban (Eliquis)  Mechanical VTE Prophylaxis in Place: Yes    Patient Centered Rounds: I have performed bedside rounds with nursing staff today  Discussions with Specialists or Other Care Team Provider: Yes  Education and Discussions with Family / Patient:Yes  Time Spent for Care: 45 minutes  More than 50% of total time spent on counseling and coordination of care as described above      Current Length of Stay: 2 day(s)  Current Patient Status: Inpatient     Discharge Plan: STR    Code Status: Level 1 - Full Code      Subjective:   Patient is still complaining of shortness of breath  And also reports chest pain with coughing  Heart rate was variable telemetry  Objective:     Vitals:   Temp (24hrs), Av 6 °F (37 °C), Min:97 6 °F (36 4 °C), Max:99 8 °F (37 7 °C)    Temp:  [97 6 °F (36 4 °C)-99 8 °F (37 7 °C)] 97 7 °F (36 5 °C)  HR:  [] 110  Resp:  [18-20] 18  BP: (112-155)/(70-93) 140/91  SpO2:  [90 %-96 %] 93 %  Body mass index is 40 18 kg/m²  Input and Output Summary (last 24 hours): Intake/Output Summary (Last 24 hours) at 2019 1439  Last data filed at 2019 1228  Gross per 24 hour   Intake 2400 ml   Output    Net 2400 ml        Physical Exam:     Physical Exam   Constitutional: No distress  HENT:   Head: Normocephalic and atraumatic  Eyes: Pupils are equal, round, and reactive to light  Conjunctivae are normal    Neck: Normal range of motion  Neck supple  Cardiovascular: Normal heart sounds  Irregularly irregular   Pulmonary/Chest: Effort normal  No respiratory distress  He has no wheezes  He has no rhonchi  He has no rales  He exhibits no tenderness  Abdominal: Soft  Bowel sounds are normal  He exhibits no distension  There is no tenderness  There is no rebound and no guarding  Musculoskeletal: He exhibits no edema  Neurological: He is alert  No cranial nerve deficit  Skin: Skin is warm and dry  No rash noted         Additional Data:     Labs:    Results from last 7 days   Lab Units 1937 19  2313   WBC Thousand/uL 5 50 8 72 14 40*   HEMOGLOBIN g/dL 14 5 14 1 17 6*   HEMATOCRIT % 41 9 38 6 49 3   PLATELETS Thousands/uL 156 218 295   NEUTROS PCT % 46  --   --      Results from last 7 days   Lab Units 19  0537 19  2313   SODIUM mmol/L 135* 133* 132*   POTASSIUM mmol/L 4 7 4 2 4 9   CHLORIDE mmol/L 100 98* 96*   CO2 mmol/L 26 17* 21   BUN mg/dL 17 31* 39*   CREATININE mg/dL 0 81 0 94 1 51*   CALCIUM mg/dL 7 8* 9 2 10 7*   TOTAL BILIRUBIN mg/dL  --  0 30 0 30   ALK PHOS U/L  --  65 84   ALT U/L  --  35 40   AST U/L  --  16 17     Results from last 7 days   Lab Units 08/06/19  2313   INR  0 92     Results from last 7 days   Lab Units 08/06/19  2313   TROPONIN I ng/mL <0 02     Lab Results   Component Value Date/Time    HGBA1C 8 3 (H) 04/19/2019 10:19 AM    HGBA1C 8 0 07/21/2017 09:27 AM     Results from last 7 days   Lab Units 08/09/19  1052 08/09/19  0701 08/08/19  2032 08/08/19  1621 08/08/19  1050 08/08/19  0709 08/07/19  2028 08/07/19  1647 08/07/19  1110 08/07/19  0709   POC GLUCOSE mg/dl 343* 250* 181* 165* 318* 213* 231* 173* 151* 208*     Results from last 7 days   Lab Units 08/08/19  0443 08/07/19  1032 08/07/19  0159 08/06/19  2319   LACTIC ACID mmol/L  --   --  1 0 2 8*   PROCALCITONIN ng/ml 0 06 0 07  --   --        * I Have Reviewed All Lab Data Listed Above  * Additional Pertinent Lab Tests Reviewed: [address] Admission Reviewed    Imaging:     CT chest wo contrast   Final Result by Nirav Kendall MD (08/07 4933)   1  Bibasilar linear opacities compatible with atelectasis versus scarring  Increased attenuation material at the peripheral left lung base (series 2 image 45) which appears new from CT dated 10/31/2018  Correlate for history of surgical intervention in    the interval  Alternatively this may represent sequela of aspiration  Clinical and laboratory correlation is recommended  2  Hepatic steatosis  Workstation performed: [id number]         XR chest 1 view portable   Final Result by Stephan Hi MD (08/07 2060)      Cardiomegaly  CT scan recommended to exclude an abnormality at the right lung base obscuring the right hemidiaphragm  Workstation performed: [id number]           Imaging Reports Reviewed by myself    Cultures:   Blood Culture:   Lab Results   Component Value Date    BLOODCX No Growth at 48 hrs  08/06/2019    BLOODCX No Growth at 48 hrs  08/06/2019    BLOODCX No Growth After 5 Days  11/16/2018    BLOODCX Staphylococcus coagulase negative (A) 11/16/2018    BLOODCX No Growth After 5 Days  10/31/2018    BLOODCX No Growth After 5 Days   10/31/2018     Urine Culture:   Lab Results   Component Value Date    URINECX 4553-3671 cfu/ml Mixed Contaminants X2 03/20/2017    URINECX No Growth <1000 cfu/mL 11/27/2016    URINECX No Growth <1000 cfu/mL 09/02/2016     Sputum Culture: No components found for: SPUTUMCX  Wound Culture: No results found for: WOUNDCULT    Last 24 Hours Medication List:     Current Facility-Administered Medications:  ALPRAZolam 1 mg Oral HS PRN Jeri Pfeiffer DO    apixaban 5 mg Oral BID MANAS Orosco    aspirin 81 mg Oral QAM MANAS Orosco    busPIRone 15 mg Oral BID MANAS Orosco    cefTRIAXone 1,000 mg Intravenous Q24H Arcenio Gallegos DO Last Rate: 1,000 mg (08/09/19 0818)   [START ON 8/10/2019] cefuroxime 500 mg Oral Q12H [address] Deborah Hooker MD    digoxin 250 mcg Oral Daily Kori PiMANAS perdue    fenofibrate 145 mg Oral QAM MANAS Orosco    hydrocodone-chlorpheniramine polistirex 5 mL Oral Q12H PRN Lorena Hooker MD    insulin glargine 45 Units Subcutaneous HS MANAS Orosco    insulin lispro 1-6 Units Subcutaneous HS MANAS Orosco    insulin lispro 17 Units Subcutaneous Daily With Lunch MANAS Orosco    insulin lispro 2-12 Units Subcutaneous TID AC MANAS Orosco    insulin lispro 20 Units Subcutaneous Daily Before Breakfast MANAS Orosco    insulin lispro 22 Units Subcutaneous Daily With Dinner MANAS Orosco    levalbuterol 1 25 mg Nebulization Q8H Galindo Meager, PA-C    And        sodium chloride 3 mL Nebulization Q8H Galindo Meager, PA-C    losartan 50 mg Oral BID Jodeane Karolina, CRNP    methylPREDNISolone sodium succinate 20 mg Intravenous TID Sean Acosta MD    metoprolol succinate 200 mg Oral Daily MANAS Orosco ondansetron 4 mg Intravenous Q6H PRN Amie Santoro, CRNP    pantoprazole 40 mg Oral Early Morning Amie Santoro, CRNP    pravastatin 40 mg Oral Daily With Dinner Amie Santoro, CRNP    pregabalin 50 mg Oral TID Amie Santoro, CRNP    QUEtiapine 200 mg Oral QAM Amie Santoro, CRNP    QUEtiapine 400 mg Oral HS Amie Santoro, CRNP    traMADol 50 mg Oral Q6H PRN Silas Wharton MD         Today, Patient Was Seen By: Silas Wharton MD    ** Please Note: Dragon 360 Dictation voice to text software may have been used in the creation of this document   **

## 2019-08-09 NOTE — PROGRESS NOTES
Progress Note - Cardiology   [facility]     Bianca James 60 y o  male MRN: [id number]  : 1960  Unit/Bed#: [address] [facility]- Encounter: [id number]    Assessment and Plan:   1  Severe restrictive lung disease with obesity hypoventilation syndrome:  Patient is followed by pulmonology  States breathing is slowly improving with current therapy  2  Chronic atrial fibrillation:  Patient noted to have markedly dilated left atrium and it is felt that cardioversion would not be successful  Rates slowly improving with addition of digoxin  Continue patient's Toprol  mg and his Eliquis  Continue monitor telemetry  3  Chronic systolic heart failure:  EF is 40%  Patient appears to be euvolemic  Continue ARB and beta-blocker  Monitor weights, I&O and electrolytes  4  Hypertension:  Blood pressure is improving with increased dose of Cozaar  Continue to monitor  5  History of coronary artery disease    6  Diabetes with hemoglobin A1c 8 3     7  Morbid obesity    Subjective / Objective:   Patient seen and examined  Telemetry monitoring reviewed, remains in atrial fibrillation, rates slowly improving with the addition of digoxin  Will continue to monitor  Vitals: Blood pressure 140/91, pulse (!) 110, temperature 97 7 °F (36 5 °C), temperature source Oral, resp  rate 18, height 5' 10" (1 778 m), weight 127 kg (280 lb), SpO2 93 %  Vitals:    19 2248 19 0226   Weight: 129 kg (284 lb 6 3 oz) 127 kg (280 lb)     Body mass index is 40 18 kg/m²  BP Readings from Last 3 Encounters:   19 140/91   19 124/80   19 120/80     Orthostatic Blood Pressures      Most Recent Value   Blood Pressure  140/91 filed at 2019 1100   Patient Position - Orthostatic VS  Sitting filed at 2019 0746        I/O        07 -  07 07 -  07 07 - 08/10 0700    P  O  225 1440 960    Total Intake(mL/kg) 225 (1 8) 1440 (11 3) 960 (7 6) Urine (mL/kg/hr) 400 (0 1)      Total Output 400      Net -175 +1440 +960               Invasive Devices     Peripheral Intravenous Line            Peripheral IV 08/06/19 Left Antecubital 2 days                  Intake/Output Summary (Last 24 hours) at 8/9/2019 1247  Last data filed at 8/9/2019 1228  Gross per 24 hour   Intake 2400 ml   Output    Net 2400 ml         Physical Exam:   Physical Exam   Constitutional: He is oriented to person, place, and time  He appears well-developed and well-nourished  No distress  HENT:   Head: Normocephalic and atraumatic  Right Ear: External ear normal    Left Ear: External ear normal    Eyes: Pupils are equal, round, and reactive to light  Conjunctivae are normal  Right eye exhibits no discharge  Left eye exhibits no discharge  No scleral icterus  Neck: Normal range of motion  Neck supple  No JVD present  No thyromegaly present  Cardiovascular: Normal pulses  An irregular rhythm present  Occasional extrasystoles are present  Tachycardia present  Murmur heard  Systolic murmur is present with a grade of 1/6  Pulmonary/Chest: Effort normal  No accessory muscle usage  No respiratory distress  He has decreased breath sounds  He has no wheezes  He has no rales  Abdominal: Soft  Bowel sounds are normal  He exhibits no distension  Musculoskeletal: He exhibits no edema  Neurological: He is alert and oriented to person, place, and time  Skin: Skin is warm and dry  Capillary refill takes less than 2 seconds  He is not diaphoretic  Psychiatric: He has a normal mood and affect  Nursing note and vitals reviewed                Medications/ Allergies:     Current Facility-Administered Medications:  ALPRAZolam 1 mg Oral HS PRN Jeri Pfeiffer DO    apixaban 5 mg Oral BID MANAS Luu    aspirin 81 mg Oral QAM MANAS Luu    busPIRone 15 mg Oral BID MANAS Luu    cefTRIAXone 1,000 mg Intravenous Q24H DO Harvey Perla Rate: 1,000 mg (08/09/19 0818)   digoxin 250 mcg Oral Daily [place], CRNP    fenofibrate 145 mg Oral QAM Carter Crank, CRNP    hydrocodone-chlorpheniramine polistirex 5 mL Oral Q12H PRN Codie Morgan MD    insulin glargine 45 Units Subcutaneous HS Summers Crank, CRNP    insulin lispro 1-6 Units Subcutaneous HS Summers Crank, CRNP    insulin lispro 17 Units Subcutaneous Daily With Lunch Summers Crank, CRNP    insulin lispro 2-12 Units Subcutaneous TID AC Carter Crank, CRNP    insulin lispro 20 Units Subcutaneous Daily Before Breakfast Summers Crank, CRNP    insulin lispro 22 Units Subcutaneous Daily With Dinner Summers Crank, CRNP    levalbuterol 1 25 mg Nebulization Q8H Dionicio Bottoms, PA-C    And        sodium chloride 3 mL Nebulization Q8H Dionicio Bottoms, PA-C    losartan 50 mg Oral BID Carter Crank, CRNP    methylPREDNISolone sodium succinate 20 mg Intravenous TID Codie Morgan MD    metoprolol succinate 200 mg Oral Daily Carter Crank, CRNP    ondansetron 4 mg Intravenous Q6H PRN Summers Crank, CRNP    pantoprazole 40 mg Oral Early Morning Carter Crank, CRNP    pravastatin 40 mg Oral Daily With Dinner Summers Crank, CRNP    pregabalin 50 mg Oral TID Summers Crank, CRNP    QUEtiapine 200 mg Oral QAM Summers Crank, CRNP    QUEtiapine 400 mg Oral HS Summers Crank, CRNP    traMADol 50 mg Oral Q6H PRN Deborah Hooker MD        ALPRAZolam 1 mg HS PRN   hydrocodone-chlorpheniramine polistirex 5 mL Q12H PRN   ondansetron 4 mg Q6H PRN   traMADol 50 mg Q6H PRN     Allergies   Allergen Reactions    Wellbutrin [Bupropion] Other (See Comments)     Alteration with hearing and other senses       VTE Pharmacologic Prophylaxis:   Sequential compression device (Venodyne)     Labs:   Troponins:  Results from last 7 days   Lab Units 08/06/19  2313   TROPONIN I ng/mL <0 02     CBC with diff:  Results from last 7 days   Lab Units 08/08/19  6913 08/07/19  0283 08/06/19  2313   WBC Thousand/uL 5 50 8 72 14 40*   HEMOGLOBIN g/dL 14 5 14 1 17 6*   HEMATOCRIT % 41 9 38 6 49 3   MCV fL 93 90 91   PLATELETS Thousands/uL 156 218 295   MCH pg 32 2 32 9 32 4   MCHC g/dL 34 6 36 5 35 7   RDW % 13 2 13 0 13 2   MPV fL 10 5 10 5 10 5   NRBC AUTO /100 WBCs 0  --  1       CMP:  Results from last 7 days   Lab Units 08/08/19  0443 08/07/19  0537 08/06/19  2313   SODIUM mmol/L 135* 133* 132*   POTASSIUM mmol/L 4 7 4 2 4 9   CHLORIDE mmol/L 100 98* 96*   CO2 mmol/L 26 17* 21   ANION GAP mmol/L 9 18* 15*   BUN mg/dL 17 31* 39*   CREATININE mg/dL 0 81 0 94 1 51*   CALCIUM mg/dL 7 8* 9 2 10 7*   AST U/L  --  16 17   ALT U/L  --  35 40   ALK PHOS U/L  --  65 84   TOTAL PROTEIN g/dL  --  6 4 7 3   ALBUMIN g/dL  --  3 4* 3 8   TOTAL BILIRUBIN mg/dL  --  0 30 0 30   EGFR ml/min/1 73sq m 97 88 50       Magnesium:  Results from last 7 days   Lab Units 08/06/19  2313   MAGNESIUM mg/dL 2 0     Coags:  Results from last 7 days   Lab Units 08/06/19  2313   PTT seconds 24   INR  0 92     TSH:  Results from last 7 days   Lab Units 08/07/19  1032   TSH 3RD GENERATON uIU/mL 0 920     Lipid Profile:    Hgb A1c:    NT-proBNP:   Recent Labs     08/06/19  2313   NTBNP 323*        Imaging & Testing   I have personally reviewed pertinent reports  Xr Chest 1 View Portable    Result Date: 8/7/2019  Narrative: CHEST INDICATION:   sob  COMPARISON:  06/28/2019 EXAM PERFORMED/VIEWS:  XR CHEST PORTABLE 1 image FINDINGS: Cardiomediastinal silhouette appears enlarged  The pulmonary vessels are normal  The left lung is clear  There is obscuration of the right hemidiaphragm and increased density at the right lung base  Although possibly related to a pericardial fat pad, CT scan of the chest recommended to exclude an abnormality at the right lung base  No pneumothorax or pleural effusion  Osseous structures appear within normal limits for patient age  Impression: Cardiomegaly   CT scan recommended to exclude an abnormality at the right lung base obscuring the right hemidiaphragm  Workstation performed: [id number]     Ct Chest Wo Contrast    Result Date: 8/7/2019  Narrative: CT CHEST WITHOUT IV CONTRAST INDICATION:   Cough, persistent Shortness of breath  COMPARISON:  Chest radiograph 8/6/2019, chest CT 10/31/2018 TECHNIQUE: CT examination of the chest was performed without intravenous contrast   Axial, sagittal, and coronal 2D reformatted images were created from the source data and submitted for interpretation  Radiation dose length product (DLP) for this visit:  1332 mGy-cm   This examination, like all CT scans performed in the [facility], was performed utilizing techniques to minimize radiation dose exposure, including the use of iterative reconstruction and automated exposure control  FINDINGS: LUNGS:  There are linear opacities at the bilateral lung bases  Focal area of increased attenuation at the left lung base (series 601 image 147)  Patchy opacity in the peripheral right middle lobe likely reflecting atelectasis  No endobronchial lesion  PLEURA:  Unremarkable  HEART/GREAT VESSELS:  Unremarkable for patient's age  MEDIASTINUM AND CHRISTOPHER:  Unremarkable  CHEST WALL AND LOWER NECK:   Unremarkable  VISUALIZED STRUCTURES IN THE UPPER ABDOMEN:  Visualized hepatic parenchyma is diffusely decreased in density consistent with hepatic steatosis  Status post cholecystectomy  Otherwise no clinical significant abnormality identified in the visualized upper abdomen  OSSEOUS STRUCTURES:  No acute fracture or destructive osseous lesion  Impression: 1  Bibasilar linear opacities compatible with atelectasis versus scarring  Increased attenuation material at the peripheral left lung base (series 2 image 45) which appears new from CT dated 10/31/2018  Correlate for history of surgical intervention in the interval  Alternatively this may represent sequela of aspiration   Clinical and laboratory correlation is recommended  2  Hepatic steatosis  Workstation performed: [id number]        EKG / Monitor: Personally reviewed  Atrial fibrillation    Cardiac testing:   Results for orders placed during the hospital encounter of 19   Echo complete with contrast if indicated    Narrative [address]  [address] [facility], [place]  [phone]    Transthoracic Echocardiogram  2D, M-mode, Doppler, and Color Doppler    Study date:  07-Aug-2019    Patient: Nallely Trujillo  MR number: [id number]  Account number: [de-identified]  : 1960  Age: 60 years  Gender: Male  Status: Inpatient  Location: Bedside  Height: 70 in  Weight: 279 4 lb  BP: 113/ 68 mmHg    Indications: Pulmonary HTN    Diagnoses: I27 9 - Pulmonary heart disease, unspecified    Sonographer:  MAIDA Rodriguez  Primary Physician:  Karolina Castro MD  Referring Physician:  MAANS Webb  Group:  [facility]  Interpreting Physician:  Dax Lebron MD    SUMMARY    LEFT VENTRICLE:  Systolic function was moderately reduced  Ejection fraction was estimated in the range of 40 % to 45 % to be 40 %  There was mild diffuse hypokinesis  Wall thickness was mildly increased  RIGHT VENTRICLE:  The size was at the upper limits of normal     LEFT ATRIUM:  The atrium was markedly dilated measuring 40 ml/m2 in area    RIGHT ATRIUM:  The atrium was mildly dilated  MITRAL VALVE:  There was trace regurgitation  TRICUSPID VALVE:  Unable to obtain adequate TR wave form even with saline injection to estimate pulmonary pressures  There was trace regurgitation  HISTORY: PRIOR HISTORY: Pneumonia,Bipolar,chronic kidney Disease,obstructive sleep apnea,COPD,CAD,HTN,Respiratory failure,A Fib ,Diabetes  PROCEDURE: The procedure was performed at the bedside  This was a routine study  The transthoracic approach was used   The study included complete 2D imaging, M-mode, complete spectral Doppler, and color Doppler  The heart rate was 104 bpm,  at the start of the study  Intravenous contrast (agitated saline) was administered to enhance Doppler signals  Image quality was adequate  LEFT VENTRICLE: Size was normal  Systolic function was moderately reduced  Ejection fraction was estimated in the range of 40 % to 45 % to be 40 %  There was mild diffuse hypokinesis  Wall thickness was mildly increased  No evidence of  apical thrombus  DOPPLER: Left ventricular diastolic function parameters were abnormal     RIGHT VENTRICLE: The size was at the upper limits of normal  Systolic function was normal  Wall thickness was normal     LEFT ATRIUM: The atrium was markedly dilated measuring 40 ml/m2 in area    RIGHT ATRIUM: The atrium was mildly dilated  MITRAL VALVE: Valve structure was normal  There was normal leaflet separation  DOPPLER: The transmitral velocity was within the normal range  There was no evidence for stenosis  There was trace regurgitation  AORTIC VALVE: The valve was trileaflet  Leaflets exhibited mildly increased thickness and normal cuspal separation  DOPPLER: Transaortic velocity was within the normal range  There was no evidence for stenosis  There was no significant  regurgitation  TRICUSPID VALVE: Unable to obtain adequate TR wave form even with saline injection to estimate pulmonary pressures The valve structure was normal  There was normal leaflet separation  DOPPLER: The transtricuspid velocity was within the  normal range  There was no evidence for stenosis  There was trace regurgitation  PULMONIC VALVE: Leaflets exhibited normal thickness, no calcification, and normal cuspal separation  DOPPLER: The transpulmonic velocity was within the normal range  There was no significant regurgitation  PERICARDIUM: There was no pericardial effusion  The pericardium was normal in appearance  AORTA: The root exhibited normal size      SYSTEMIC VEINS: IVC: The inferior vena cava was normal in size     SYSTEM MEASUREMENT TABLES    2D mode  AoR Diam 2D: 3 7 cm  LA Diam (2D): 5 cm  LA/Ao (2D): 1 35  FS (2D Teich): 16 5 %  IVSd (2D): 1 14 cm  LVDEV: 98 3 cmï¾³  LVEDV MOD BP: 154 cmï¾³  LVESV: 64 3 cmï¾³  LVIDd(2D): 4 62 cm  LVISd (2D): 3 86 cm  LVOT Area 2D: 3 8 cmï¾²  LVPWd (2D): 1 17 cm  SV (Teich): 34 cmï¾³    Apical four chamber  LVEF A4C: 33 %    Apical two chamber  LVEF A2C: 38 %    Unspecified Scan Mode  DANIELLE Cont Eq (Peak John): 2 98 cmï¾²  LVOT Diam : 2 cm  LVOT Vmax: 1200 mm/s  LVOT Vmax; Mean: 1200 mm/s  Peak Grad ; Mean: 6 mm[Hg]  MV Peak E John  Mean: 864 mm/s  MVA (PHT): 5 cmï¾²  PHT: 44 ms  Max P mm[Hg]  V Max: 3490 mm/s  Vmax: 3280 mm/s  RA Area: 27 8 cmï¾²  RA Volume: 92 cmï¾³  TAPSE: 2 cm    [employer] Accredited Echocardiography Laboratory    Prepared and electronically signed by    Sridhar Ahuja MD  Signed 08-Aug-2019 12:05:31         Aguila Johnston        "This note has been constructed using a voice recognition system  Therefore there may be syntax, spelling, and/or grammatical errors   Please call if you have any questions  "

## 2019-08-09 NOTE — SOCIAL WORK
Faxed to the [facility] to have [facility] approved  Pt already has a valid PAS that was completed on 2/1/19  Is valid for one year

## 2019-08-09 NOTE — PLAN OF CARE
Problem: Potential for Falls  Goal: Patient will remain free of falls  Description  INTERVENTIONS:  - Assess patient frequently for physical needs  -  Identify cognitive and physical deficits and behaviors that affect risk of falls  -  [place] fall precautions as indicated by assessment   - Educate patient/family on patient safety including physical limitations  - Instruct patient to call for assistance with activity based on assessment  - Modify environment to reduce risk of injury  - Consider OT/PT consult to assist with strengthening/mobility  8/9/2019 0755 by Meli Cifuentes RN  Outcome: Progressing  8/8/2019 2029 by Meli Cifuentes RN  Outcome: Progressing     Problem: RESPIRATORY - ADULT  Goal: Achieves optimal ventilation and oxygenation  Description  INTERVENTIONS:  - Assess for changes in respiratory status  - Assess for changes in mentation and behavior  - Position to facilitate oxygenation and minimize respiratory effort  - Oxygen administration by appropriate delivery method based on oxygen saturation (per order) or ABGs  - Encourage broncho-pulmonary hygiene including cough, deep breathe, Incentive Spirometry  - Assess the need for suctioning and aspirate as needed  - Assess and instruct to report SOB or any respiratory difficulty  - Respiratory Therapy support as indicated  - Bipap 12/5 HS for SHAVONNE   8/9/2019 0755 by Meli Cifuentes RN  Outcome: Progressing  8/8/2019 2029 by Meli Cifuentes RN  Outcome: Progressing     Problem: CARDIOVASCULAR - ADULT  Goal: Maintains optimal cardiac output and hemodynamic stability  Description  INTERVENTIONS:  - Monitor I/O, vital signs and rhythm  - Monitor for S/S and trends of decreased cardiac output i e  bleeding, hypotension  - Administer and titrate ordered vasoactive medications to optimize hemodynamic stability  - Assess quality of pulses, skin color and temperature  - Assess for signs of decreased coronary artery perfusion - ex   Angina  - Instruct patient to report change in severity of symptoms  8/9/2019 0755 by Meli Valencia RN  Outcome: Progressing  8/8/2019 2029 by Meli Valencia RN  Outcome: Progressing  Goal: Absence of cardiac dysrhythmias or at baseline rhythm  Description  INTERVENTIONS:  - Continuous cardiac monitoring, monitor vital signs, obtain 12 lead EKG if indicated  - Administer antiarrhythmic and heart rate control medications as ordered  - Monitor electrolytes and administer replacement therapy as ordered  8/9/2019 0755 by Meli Valencia RN  Outcome: Progressing  8/8/2019 2029 by Meli Valencia RN  Outcome: Progressing     Problem: INFECTION - ADULT  Goal: Absence or prevention of progression during hospitalization  Description  INTERVENTIONS:  - Assess and monitor for signs and symptoms of infection  - Monitor lab/diagnostic results  - Monitor all insertion sites, i e  indwelling lines, tubes, and drains  - Anita appropriate cooling/warming therapies per order  - Administer medications as ordered  - Instruct and encourage patient and family to use good hand hygiene technique  - Identify and instruct in appropriate isolation precautions for identified infection/condition   8/9/2019 0755 by Meli Valencia RN  Outcome: Progressing  8/8/2019 2029 by Meli Valencia RN  Outcome: Progressing  Goal: Absence of fever/infection during neutropenic period  Description  INTERVENTIONS:  - Monitor WBC  - Implement neutropenic guidelines  8/9/2019 0755 by Meli Duong RN  Outcome: Progressing  8/8/2019 2029 by Meli Valencia RN  Outcome: Progressing     Problem: Knowledge Deficit  Goal: Patient/family/caregiver demonstrates understanding of disease process, treatment plan, medications, and discharge instructions  Description  Complete learning assessment and assess knowledge base    Interventions:  - Provide teaching at level of understanding  - Provide teaching via preferred learning methods  8/9/2019 0755 by Meli Valencia RN  Outcome: Progressing  8/8/2019 2029 by Meli Sears RN  Outcome: Progressing

## 2019-08-09 NOTE — PROGRESS NOTES
Progress Note - Pulmonary   Terressa Meter 60 y o  male MRN: [id number]  Unit/Bed#: [address] [address]- Encounter: [id number]      Assessment/Plan:     Possible pneumonia:  -patient currently on gram-negative coverage for possible healthcare associated coverage > ceftriaxone 4/5  -recently admitted June 2019     AGMA w/ Lactic acidosis in setting of DM2 w/ Hyperglycemia > Likely 2/2 DKA:  -resolved       ANA LILIA:  -Resolved     Severe restrictive lung disease / obesity hypoventilation syndrome  -secondary to morbid obesity  -AM ABG was 7 4/47/90 on BIPAP 12/5/35  -continue HS bipap     Morbid obesity with BMI 40 1  -needs referral to weight management program       Emphysematous COPD  -changed to Xopenex due to tachycardia  -normally is on Trelegy in the outpatient setting  -patient is still pending complete pulmonary function test in the outpatient setting which has previously been ordered> should have performed 4 weeks s/p full recovery  -is on solumedrol 20 mg TID > continue     Chronic mixed respiratory failure:  -continue BiPAP therapy 12/5 HS and naps  -continue 2 L nasal cannula as needed during the day  -titrate for oxygen saturation 88-92%  -continue 2 L nasal cannula at night     Right Basilar Atelectasis:  -continue bipap  -intitiate IS            ______________________________________________________________________    Subjective: Pt seen and examined at bedside  Was tachycardic overnight  Changed to xopenex this AM   No specific complaints  Tele Events:     Vitals:   Temp:  [97 6 °F (36 4 °C)-99 8 °F (37 7 °C)] 97 6 °F (36 4 °C)  HR:  [] 89  Resp:  [18-20] 20  BP: (112-155)/(70-93) 155/93  Weight (last 2 days)     Date/Time   Weight    08/07/19 0226   127 (280)            Oxygen Therapy  SpO2: 92 %  O2 Flow Rate (L/min): 2 L/min        IV Infusions:       Nutrition:        Diet Orders   (From admission, onward)             Start     Ordered    08/07/19 0908  Room Service  Once     Question:  Type of Service  Answer:  Room Service-Appropriate    08/07/19 0492    08/07/19 0305  Diet [place]/CHO Controlled; Consistent Carbohydrate Diet Level 3 (6 carb servings/90 grams CHO/meal)  Diet effective now     Question Answer Comment   Diet Type [place]/CHO Controlled    Campos/CHO Controlled Consistent Carbohydrate Diet Level 3 (6 carb servings/90 grams CHO/meal)    RD to adjust diet per protocol? Yes        08/07/19 0308                Ins/Outs:   I/O       08/07 0701 - 08/08 0700 08/08 0701 - 08/09 0700 08/09 0701 - 08/10 0700    P  O  225 1440     Total Intake(mL/kg) 225 (1 8) 1440 (11 3)     Urine (mL/kg/hr) 400 (0 1)      Total Output 400      Net -175 +1440                  Lines/Drains:  Invasive Devices     Peripheral Intravenous Line            Peripheral IV 08/06/19 Left Antecubital 2 days                 Active medications:  Scheduled Meds:  Current Facility-Administered Medications:  ALPRAZolam 1 mg Oral HS PRN Jeri Pfeiffer DO    apixaban 5 mg Oral BID Leobardo Hearing, CRNP    aspirin 81 mg Oral QAM Leobardo Hearing, CRNP    busPIRone 15 mg Oral BID Leobardo Hearing, CRNP    cefTRIAXone 1,000 mg Intravenous Q24H Carlitos Myers DO Last Rate: 1,000 mg (08/09/19 0818)   digoxin 250 mcg Oral Daily José Gibson, CRNP    fenofibrate 145 mg Oral QAM Leobardo Hearing, CRNP    hydrocodone-chlorpheniramine polistirex 5 mL Oral Q12H PRN Daniel Hendrickson MD    insulin glargine 45 Units Subcutaneous HS Leobardo Hearing, CRNP    insulin lispro 1-6 Units Subcutaneous HS Leobardo Hearing, CRNP    insulin lispro 17 Units Subcutaneous Daily With Lunch Leobardo Hearing, CRNP    insulin lispro 2-12 Units Subcutaneous TID AC Leobardo Hearing, CRNP    insulin lispro 20 Units Subcutaneous Daily Before Breakfast Leobardo Hearing, CRNP    insulin lispro 22 Units Subcutaneous Daily With Dinner Leobardo Hearing, CRNP    levalbuterol 1 25 mg Nebulization Q8H Arnav Epstein PA-C    And        sodium chloride 3 mL Nebulization Q6H PRN Rey Salguero PA-C    losartan 50 mg Oral BID Katherene Dinning, MANAS    methylPREDNISolone sodium succinate 20 mg Intravenous TID Marya Smoke, MD    metoprolol succinate 200 mg Oral Daily Katherene Dinning, CRNP    ondansetron 4 mg Intravenous Q6H PRN Katherene Dinning, CRNP    pantoprazole 40 mg Oral Early Morning Katherene Dinning, CRNP    pravastatin 40 mg Oral Daily With Dinner Katherene Dinning, CRNP    pregabalin 50 mg Oral TID Katherene Dinning, CRNP    QUEtiapine 200 mg Oral QAM Katherene Dinning, CRNP    QUEtiapine 400 mg Oral HS Katherene Dinning, CRNP    traMADol 50 mg Oral Q6H PRN Marya Smoke, MD      PRN Meds:  ALPRAZolam 1 mg HS PRN   hydrocodone-chlorpheniramine polistirex 5 mL Q12H PRN   ondansetron 4 mg Q6H PRN   sodium chloride 3 mL Q6H PRN   traMADol 50 mg Q6H PRN     ____________________________________________________________________      Physical Exam   Constitutional: He is oriented to person, place, and time  He appears well-developed  Morbidly obese body habitus   HENT:   Head: Normocephalic and atraumatic  Eyes: Pupils are equal, round, and reactive to light  Conjunctivae are normal    Neck: Normal range of motion  Neck supple  Cardiovascular: Normal rate, regular rhythm and normal heart sounds  Exam reveals no gallop and no friction rub  No murmur heard  Pulmonary/Chest: Effort normal  No accessory muscle usage  No tachypnea  No respiratory distress  He has decreased breath sounds in the right upper field, the right middle field, the right lower field, the left upper field, the left middle field and the left lower field  He has no wheezes  He has no rhonchi  He has no rales  He exhibits no tenderness  Mildly decreased breath sounds bilaterally    On 2 L NC02    Abdominal: Soft  Bowel sounds are normal    Musculoskeletal: Normal range of motion  Right lower leg: He exhibits edema  Left lower leg: He exhibits edema     Trace LE edema   Neurological: He is alert and oriented to person, place, and time  Skin: Skin is warm and dry  Psychiatric: He has a normal mood and affect            ____________________________________________________________________    Labs:   CBC: Results from last 7 days   Lab Units 08/08/19  0443 08/07/19  0537 08/06/19  2313   WBC Thousand/uL 5 50 8 72 14 40*   HEMOGLOBIN g/dL 14 5 14 1 17 6*   HEMATOCRIT % 41 9 38 6 49 3   MCV fL 93 90 91   PLATELETS Thousands/uL 156 218 295     CMP: Results from last 7 days   Lab Units 08/08/19  0443 08/07/19  0537 08/06/19  2313   POTASSIUM mmol/L 4 7 4 2 4 9   CHLORIDE mmol/L 100 98* 96*   CO2 mmol/L 26 17* 21   BUN mg/dL 17 31* 39*   CREATININE mg/dL 0 81 0 94 1 51*   CALCIUM mg/dL 7 8* 9 2 10 7*   AST U/L  --  16 17   ALT U/L  --  35 40   ALK PHOS U/L  --  65 84   EGFR ml/min/1 73sq m 97 88 50     No components found for: ABG    Magnesium:   Results from last 7 days   Lab Units 08/06/19  2313   MAGNESIUM mg/dL 2 0     Phosphorous:     Troponin:   Results from last 7 days   Lab Units 08/06/19  2313   TROPONIN I ng/mL <0 02     PT/INR:   Results from last 7 days   Lab Units 08/06/19  2313   PTT seconds 24   INR  0 92     Lactic Acid:   Results from last 7 days   Lab Units 08/07/19  0159 08/06/19  2319   LACTIC ACID mmol/L 1 0 2 8*     BNP:   Results from last 7 days   Lab Units 08/06/19  2313   NT-PRO BNP pg/mL 323*     TSH:   Results from last 7 days   Lab Units 08/07/19  1032   TSH 3RD GENERATON uIU/mL 0 920     Procalcitonin: Invalid input(s): PROCALCITONIN      Imaging:   CT chest wo contrast   Final Result by Luis Manuel Barbour MD (08/07 1540)   1  Bibasilar linear opacities compatible with atelectasis versus scarring  Increased attenuation material at the peripheral left lung base (series 2 image 45) which appears new from CT dated 10/31/2018  Correlate for history of surgical intervention in    the interval  Alternatively this may represent sequela of aspiration   Clinical and laboratory correlation is recommended  2  Hepatic steatosis  Workstation performed: [id number]         XR chest 1 view portable   Final Result by Kaila Jarquin MD (08/07 4030)      Cardiomegaly  CT scan recommended to exclude an abnormality at the right lung base obscuring the right hemidiaphragm  Workstation performed: [id number]               Micro: Lab Results   Component Value Date    BLOODCX No Growth at 24 hrs  08/06/2019    BLOODCX No Growth at 24 hrs  08/06/2019    BLOODCX No Growth After 5 Days  11/16/2018    URINECX 8537-3244 cfu/ml Mixed Contaminants X2 03/20/2017    URINECX No Growth <1000 cfu/mL 11/27/2016    URINECX No Growth <1000 cfu/mL 09/02/2016    SPUTUMCULTUR Test not performed  Suggest repeat specimen  03/21/2019    SPUTUMCULTUR Test not performed  Suggest repeat specimen  11/08/2017    SPUTUMCULTUR 1+ Growth of Staphylococcus aureus 03/20/2017    SPUTUMCULTUR 1+ Growth of Mixed Respiratory Francine 03/20/2017    MRSACULTURE Culture results to follow   08/07/2019            Invalid input(s): LEGIONELLAURINARYANTIGEN        Code Status: Level 1 - Full Code

## 2019-08-09 NOTE — PLAN OF CARE
Problem: Potential for Falls  Goal: Patient will remain free of falls  Description  INTERVENTIONS:  - Assess patient frequently for physical needs  -  Identify cognitive and physical deficits and behaviors that affect risk of falls  -  [place] fall precautions as indicated by assessment   - Educate patient/family on patient safety including physical limitations  - Instruct patient to call for assistance with activity based on assessment  - Modify environment to reduce risk of injury  - Consider OT/PT consult to assist with strengthening/mobility  Outcome: Progressing     Problem: RESPIRATORY - ADULT  Goal: Achieves optimal ventilation and oxygenation  Description  INTERVENTIONS:  - Assess for changes in respiratory status  - Assess for changes in mentation and behavior  - Position to facilitate oxygenation and minimize respiratory effort  - Oxygen administration by appropriate delivery method based on oxygen saturation (per order) or ABGs  - Encourage broncho-pulmonary hygiene including cough, deep breathe, Incentive Spirometry  - Assess the need for suctioning and aspirate as needed  - Assess and instruct to report SOB or any respiratory difficulty  - Respiratory Therapy support as indicated  - Bipap 12/5 HS for SHAVONNE   Outcome: Progressing     Problem: CARDIOVASCULAR - ADULT  Goal: Maintains optimal cardiac output and hemodynamic stability  Description  INTERVENTIONS:  - Monitor I/O, vital signs and rhythm  - Monitor for S/S and trends of decreased cardiac output i e  bleeding, hypotension  - Administer and titrate ordered vasoactive medications to optimize hemodynamic stability  - Assess quality of pulses, skin color and temperature  - Assess for signs of decreased coronary artery perfusion - ex   Angina  - Instruct patient to report change in severity of symptoms  Outcome: Progressing  Goal: Absence of cardiac dysrhythmias or at baseline rhythm  Description  INTERVENTIONS:  - Continuous cardiac monitoring, monitor vital signs, obtain 12 lead EKG if indicated  - Administer antiarrhythmic and heart rate control medications as ordered  - Monitor electrolytes and administer replacement therapy as ordered  Outcome: Progressing     Problem: INFECTION - ADULT  Goal: Absence or prevention of progression during hospitalization  Description  INTERVENTIONS:  - Assess and monitor for signs and symptoms of infection  - Monitor lab/diagnostic results  - Monitor all insertion sites, i e  indwelling lines, tubes, and drains  - [place] appropriate cooling/warming therapies per order  - Administer medications as ordered  - Instruct and encourage patient and family to use good hand hygiene technique  - Identify and instruct in appropriate isolation precautions for identified infection/condition   Outcome: Progressing  Goal: Absence of fever/infection during neutropenic period  Description  INTERVENTIONS:  - Monitor WBC  - Implement neutropenic guidelines  Outcome: Progressing     Problem: Knowledge Deficit  Goal: Patient/family/caregiver demonstrates understanding of disease process, treatment plan, medications, and discharge instructions  Description  Complete learning assessment and assess knowledge base    Interventions:  - Provide teaching at level of understanding  - Provide teaching via preferred learning methods  Outcome: Progressing

## 2019-08-09 NOTE — ASSESSMENT & PLAN NOTE
Continue cpap QHS and continue oxygen supplementation at 2 liters/minute  Component could be related to acute bronchitis and COPD exacerbation  Started on Solu-Medrol 20 milligram IV q 8 hours  Continue nebulizers and antibiotics  ABG showed a pH of 7 4, pCO2 47 and PO2 of 90% on BiPAP

## 2019-08-09 NOTE — ASSESSMENT & PLAN NOTE
Patient presented with worsening shortness of breath  Initially patient was admitted hospital for possible pneumonia of the right lower lobe as patient had tachypnea, tachycardia with elevated white count and elevated lactic acid level  Patient's procalcitonin level x2 was negative  Patient was initially IV cefepime and later on IV Rocephin  Patient will be transitioned to Ceftin 500 milligram p o  B i d  For 2 days  Blood cultures have been negative    Urine for Legionella and streptococcal antigens have been negative

## 2019-08-09 NOTE — ASSESSMENT & PLAN NOTE
Lab Results   Component Value Date    HGBA1C 8 3 (H) 04/19/2019       Recent Labs     08/08/19  1621 08/08/19  2032 08/09/19  0701 08/09/19  1052   POCGLU 165* 181* 250* 343*    Metformin is on hold due to lactic acidosis  Patient initially had elevated anion gap with decreased bicarbonate-thought to have a component of DKA  Labs are all normal today  Continue Lantus 45 units subcutaneously daily with Humalog sliding scale    Continue Humalog with meals-20 units with breakfast, 7 units with lunch and 22 units with dinner  Elevated blood sugar secondary to steroids

## 2019-08-10 VITALS
HEIGHT: 70 IN | HEART RATE: 94 BPM | OXYGEN SATURATION: 94 % | WEIGHT: 280 LBS | SYSTOLIC BLOOD PRESSURE: 162 MMHG | TEMPERATURE: 98.3 F | BODY MASS INDEX: 40.09 KG/M2 | RESPIRATION RATE: 18 BRPM | DIASTOLIC BLOOD PRESSURE: 90 MMHG

## 2019-08-10 LAB
GLUCOSE SERPL-MCNC: 245 MG/DL (ref 65–140)
GLUCOSE SERPL-MCNC: 259 MG/DL (ref 65–140)
GLUCOSE SERPL-MCNC: 308 MG/DL (ref 65–140)

## 2019-08-10 PROCEDURE — 99232 SBSQ HOSP IP/OBS MODERATE 35: CPT | Performed by: INTERNAL MEDICINE

## 2019-08-10 PROCEDURE — 94640 AIRWAY INHALATION TREATMENT: CPT

## 2019-08-10 PROCEDURE — 99239 HOSP IP/OBS DSCHRG MGMT >30: CPT | Performed by: INTERNAL MEDICINE

## 2019-08-10 PROCEDURE — 82948 REAGENT STRIP/BLOOD GLUCOSE: CPT

## 2019-08-10 PROCEDURE — 97535 SELF CARE MNGMENT TRAINING: CPT

## 2019-08-10 PROCEDURE — 94760 N-INVAS EAR/PLS OXIMETRY 1: CPT

## 2019-08-10 RX ORDER — SODIUM CHLORIDE FOR INHALATION 0.9 %
3 VIAL, NEBULIZER (ML) INHALATION
Refills: 0
Start: 2019-08-10 | End: 2020-02-27

## 2019-08-10 RX ORDER — DIGOXIN 250 MCG
250 TABLET ORAL DAILY
Refills: 0
Start: 2019-08-11 | End: 2020-07-22 | Stop reason: SDUPTHER

## 2019-08-10 RX ORDER — HYDROCODONE POLISTIREX AND CHLORPHENIRAMINE POLISTIREX 10; 8 MG/5ML; MG/5ML
5 SUSPENSION, EXTENDED RELEASE ORAL EVERY 12 HOURS PRN
Qty: 120 ML | Refills: 0
Start: 2019-08-10 | End: 2019-08-20

## 2019-08-10 RX ORDER — LEVALBUTEROL 1.25 MG/.5ML
1.25 SOLUTION, CONCENTRATE RESPIRATORY (INHALATION)
Refills: 0
Start: 2019-08-10 | End: 2019-09-13

## 2019-08-10 RX ORDER — CEFUROXIME AXETIL 500 MG/1
500 TABLET ORAL EVERY 12 HOURS SCHEDULED
Qty: 3 TABLET | Refills: 0
Start: 2019-08-10 | End: 2019-08-12

## 2019-08-10 RX ORDER — PREDNISONE 10 MG/1
10 TABLET ORAL DAILY
Refills: 0
Start: 2019-08-10 | End: 2019-12-04 | Stop reason: HOSPADM

## 2019-08-10 RX ADMIN — BUSPIRONE HYDROCHLORIDE 15 MG: 10 TABLET ORAL at 08:22

## 2019-08-10 RX ADMIN — QUETIAPINE FUMARATE 200 MG: 200 TABLET, EXTENDED RELEASE ORAL at 08:23

## 2019-08-10 RX ADMIN — PREGABALIN 50 MG: 50 CAPSULE ORAL at 08:22

## 2019-08-10 RX ADMIN — INSULIN LISPRO 6 UNITS: 100 INJECTION, SOLUTION INTRAVENOUS; SUBCUTANEOUS at 08:37

## 2019-08-10 RX ADMIN — APIXABAN 5 MG: 5 TABLET, FILM COATED ORAL at 17:03

## 2019-08-10 RX ADMIN — INSULIN LISPRO 22 UNITS: 100 INJECTION, SOLUTION INTRAVENOUS; SUBCUTANEOUS at 16:57

## 2019-08-10 RX ADMIN — FENOFIBRATE 145 MG: 145 TABLET, COATED ORAL at 08:22

## 2019-08-10 RX ADMIN — LOSARTAN POTASSIUM 50 MG: 50 TABLET ORAL at 17:03

## 2019-08-10 RX ADMIN — ASPIRIN 81 MG 81 MG: 81 TABLET ORAL at 08:22

## 2019-08-10 RX ADMIN — ISODIUM CHLORIDE 3 ML: 0.03 SOLUTION RESPIRATORY (INHALATION) at 07:41

## 2019-08-10 RX ADMIN — CEFUROXIME AXETIL 500 MG: 250 TABLET ORAL at 08:24

## 2019-08-10 RX ADMIN — PREGABALIN 50 MG: 50 CAPSULE ORAL at 16:59

## 2019-08-10 RX ADMIN — METOPROLOL SUCCINATE 200 MG: 100 TABLET, EXTENDED RELEASE ORAL at 08:21

## 2019-08-10 RX ADMIN — LEVALBUTEROL 1.25 MG: 1.25 SOLUTION, CONCENTRATE RESPIRATORY (INHALATION) at 07:41

## 2019-08-10 RX ADMIN — METHYLPREDNISOLONE SODIUM SUCCINATE 20 MG: 40 INJECTION, POWDER, FOR SOLUTION INTRAMUSCULAR; INTRAVENOUS at 08:22

## 2019-08-10 RX ADMIN — LEVALBUTEROL 1.25 MG: 1.25 SOLUTION, CONCENTRATE RESPIRATORY (INHALATION) at 13:19

## 2019-08-10 RX ADMIN — INSULIN LISPRO 17 UNITS: 100 INJECTION, SOLUTION INTRAVENOUS; SUBCUTANEOUS at 11:56

## 2019-08-10 RX ADMIN — ISODIUM CHLORIDE 3 ML: 0.03 SOLUTION RESPIRATORY (INHALATION) at 13:19

## 2019-08-10 RX ADMIN — BUSPIRONE HYDROCHLORIDE 15 MG: 10 TABLET ORAL at 17:03

## 2019-08-10 RX ADMIN — PRAVASTATIN SODIUM 40 MG: 40 TABLET ORAL at 16:59

## 2019-08-10 RX ADMIN — INSULIN LISPRO 20 UNITS: 100 INJECTION, SOLUTION INTRAVENOUS; SUBCUTANEOUS at 08:38

## 2019-08-10 RX ADMIN — APIXABAN 5 MG: 5 TABLET, FILM COATED ORAL at 08:22

## 2019-08-10 RX ADMIN — METHYLPREDNISOLONE SODIUM SUCCINATE 20 MG: 40 INJECTION, POWDER, FOR SOLUTION INTRAMUSCULAR; INTRAVENOUS at 16:56

## 2019-08-10 RX ADMIN — PANTOPRAZOLE SODIUM 40 MG: 40 TABLET, DELAYED RELEASE ORAL at 05:22

## 2019-08-10 RX ADMIN — LOSARTAN POTASSIUM 50 MG: 50 TABLET ORAL at 08:22

## 2019-08-10 RX ADMIN — DIGOXIN 250 MCG: 125 TABLET ORAL at 08:22

## 2019-08-10 RX ADMIN — INSULIN LISPRO 8 UNITS: 100 INJECTION, SOLUTION INTRAVENOUS; SUBCUTANEOUS at 11:55

## 2019-08-10 RX ADMIN — INSULIN LISPRO 6 UNITS: 100 INJECTION, SOLUTION INTRAVENOUS; SUBCUTANEOUS at 16:57

## 2019-08-10 NOTE — [place] UNIVERSAL TRANSFER FORM
[place] UNIVERSAL TRANSFER FORM  (ALL ITEMS MUST BE COMPLETED)    1  TRANSFER FROM: [address]      TRANSFER TO: [facility]    2  DATE OF TRANSFER: 8/10/2019                        TIME OF TRANSFER: 1730    3  PATIENT NAME: APOLINAR Wharton      YOB: 1960                             GENDER: male    4  LANGUAGE:   English    5  PHYSICIAN NAME:  Marya Rubio MD                   PHONE: [phone]  CODE STATUS: Level 1 - Full Code        Out of Hospital DNR Attached: No    7  :                                      :  Extended Emergency Contact Information  Primary Emergency Contact: Garrett Javier   [facility]  Mobile Phone: [phone]  Relation: Friend  Secondary Emergency Contact: Ellen Watson  Address: [address] Phone: [phone]  Mobile Phone: [phone]  Relation: [id number] Representative/Proxy:  No           Legal Guardian:  No             NAME OF:           HEALTH CARE REPRESENTATIVE/PROXY:                                         OR           LEGAL GUARDIAN, IF NOT :                                               PHONE:  (Day)           (Night)                        (Cell)    8  REASON FOR TRANSFER: (Must include brief medical history and recent changes in physical function or cognition ) SOB and weakness            V/S: /81 (BP Location: Right arm)   Pulse 95   Temp 98 5 °F (36 9 °C) (Tympanic)   Resp 18   Ht 5' 10" (1 778 m)   Wt 127 kg (280 lb)   SpO2 96%   BMI 40 18 kg/m²           PAIN: None    9  PRIMARY DIAGNOSIS: Acute bronchitis      Secondary Diagnosis:         Pacemaker: No      Internal Defib: No          Mental Health Diagnosis (if Applicable):    10  RESTRAINTS: No     11  RESPIRATORY NEEDS: Oxygen Device NC 2 lts,, Flow rate: 2lt and BPAP    12   ISOLATION/PRECAUTION: None    13  ALLERGY: Wellbutrin [bupropion]    14  SENSORY:       Vision Good, Hearing Good  and Speech Clear    15  SKIN CONDITION: No Wounds    16  DIET: Special (describe)Diabetic    17  IV ACCESS: None    18  PERSONAL ITEMS SENT WITH PATIENT: Glasses and OtherCell phone,BIPAP pepsi    19  ATTACHED DOCUMENTS: MUST ATTACH CURRENT MEDICATION INFORMATION Face Sheet, MAR, Medication Reconciliation, Diagnostic Studies, Operative Report, Discharge Summary, PT Note and OT Note    20  AT RISK ALERTFall        HARM TO: N/A    21  WEIGHT BEARING STATUS:         Left Leg: Limited        Right Leg: Limited    22  MENTAL STATUS:Alert and Oriented    23  FUNCTION:        Walk: With Help        Transfer: With Help        Toilet: With Help        Feed: Self    24  IMMUNIZATIONS/SCREENING:     Immunization History   Administered Date(s) Administered    Hep B, adult 12/22/2008, 03/26/2009, 12/08/2009    Influenza Quadrivalent Preservative Free 3 years and older IM 09/25/2017    Influenza TIV (IM) 10/14/2003, 12/28/2004, 10/14/2005, 10/29/2007, 11/14/2008, 10/05/2009, 01/05/2011, 09/28/2011, 10/26/2012, 09/04/2013, 10/11/2014, 09/08/2015, 09/28/2016    Influenza, injectable, quadrivalent, preservative free 0 5 mL 10/08/2018    MMR 12/04/2008, 03/26/2009    Meningococcal, Unknown Serogroups 12/22/2008    Pneumococcal Conjugate 13-Valent 09/08/2015, 11/29/2016    Pneumococcal Polysaccharide PPV23 10/14/2003    Tdap 12/04/2008    Tuberculin Skin Test-PPD Intradermal 10/30/2007, 05/14/2009, 06/02/2009, 04/20/2010, 05/04/2010       25  BOWEL: Continent and Date Last BMBM 8/9    26  BLADDER: Continent    27   SENDING FACILITY CONTACT: Isabel Zhong                  Title: RN        Unit: [address] [facility]        Phone: [phone]          REC' FACILITY CONTACT (if known):        Title:        Unit:         Phone:         FORM PREFILLED BY (if applicable)       Title:       Unit:        Phone:         FORM COMPLETED BY Angélica Mi RN Title: MARY ANN      Phone: [phone]

## 2019-08-10 NOTE — ASSESSMENT & PLAN NOTE
Patient with history of atrial fibrillation found to be in RVR in ER  He was given metoprolol and cardizem in the ER with improvement in heart rate   Secondary to worsening respiratory status   Patient was loaded with digoxin    Continue Toprol- milligram p o  Daily and digoxin 250 microgram p o  Daily   2D echo showed EF of 40-45% with mild diffuse hypokinesis and mildly dilated left atrium-cardioversion was not attempted   Continue anticoagulation with Eliquis   Heart rate is better    Continue digoxin

## 2019-08-10 NOTE — ASSESSMENT & PLAN NOTE
Lab Results   Component Value Date    HGBA1C 8 3 (H) 04/19/2019       Recent Labs     08/09/19  2056 08/10/19  0648 08/10/19  1107 08/10/19  1613   POCGLU 384* 245* 308* 259*    Metformin initially held due to lactic acidosis  Patient initially had elevated anion gap with decreased bicarbonate-thought to have a component of DKA  Labs later normalized after receiving IV fluids  Continue Lantus 45 units subcutaneously daily with Humalog sliding scale  Continue Humalog with meals-20 units with breakfast, 17 units with lunch and 22 units with dinner  Elevated blood sugar secondary to steroids  Steroids to be tapered to p o   Prednisone  Continue Dapagliflozin, Victoza and metformin

## 2019-08-10 NOTE — ASSESSMENT & PLAN NOTE
Cr elevated at 1 51, baseline appears around 1 3  Improved with IV hydration  Creatinine back to baseline    Can restart diuretics

## 2019-08-10 NOTE — ASSESSMENT & PLAN NOTE
Patient presented with worsening shortness of breath  Initially patient was admitted hospital for possible pneumonia of the right lower lobe as patient had tachypnea, tachycardia with elevated white count and elevated lactic acid level  Patient's procalcitonin level x2 was negative  Patient was initially IV cefepime and later on IV Rocephin  Patient was transitioned to Ceftin 500 milligram p o  B i d  to finish a 5 day course  Blood cultures have been negative    Urine for Legionella and streptococcal antigens have been negative

## 2019-08-10 NOTE — SOCIAL WORK
CM spoke with PCP, pt is DC ready today  Call made to [facility] and confirmed that pt is able to go to [facility] today  Confirmed that pt is [facility] admittable  Update sent to the PCP and DC pending  Call made to unit RN, pt is able to transport via [address]  Call made to [facility], transportation being scheduled     via [facility] scheduled for 1730 tonight

## 2019-08-10 NOTE — ASSESSMENT & PLAN NOTE
Continue cpap QHS and continue oxygen supplementation at 2 liters/minute  Component could be related to acute bronchitis and COPD exacerbation  Started on Solu-Medrol 20 milligram IV q 8 hours  Patient will be tapered to prednisone  Continue nebulizers and antibiotics  ABG showed a pH of 7 4, pCO2 47 and PO2 of 90% on BiPAP

## 2019-08-10 NOTE — OCCUPATIONAL THERAPY NOTE
OT TREATMENT       08/10/19 0849   Restrictions/Precautions   Other Precautions Multiple lines;Telemetry;O2;Fall Risk   Pain Assessment   Pain Assessment No/denies pain   Pain Score No Pain   ADL   Eating Assistance 7  Independent   Grooming Assistance 5  Supervision/Setup   Grooming Deficit Setup   UB Bathing Assistance 5  Supervision/Setup   UB Bathing Deficit Setup   LB Bathing Assistance 5  Supervision/Setup   LB Bathing Deficit Supervision/safety   UB Dressing Assistance 5  Supervision/Setup   UB Dressing Deficit Setup   LB Dressing Assistance 5  Supervision/Setup   LB Dressing Deficit Supervision/safety   Toileting Assistance  5  Supervision/Setup   Toileting Deficit Supervison/safety;Grab bar use   Transfers   Sit to Stand 5  Supervision   Stand to Sit 5  Supervision   Stand pivot 5  Supervision   Functional Mobility   Functional Mobility 5  Supervision   Additional Comments between bedside chair and bathoom and back at  level   Cognition   Overall Cognitive Status [facility]   Arousal/Participation Alert; Responsive; Cooperative   Attention Within functional limits   Orientation Level Oriented X4   Following Commands Follows all commands and directions without difficulty   Activity Tolerance   Activity Tolerance Patient tolerated treatment well   Medical Staff Made Aware Pt is fatigued by the end of session  Reports he would like to shave at some point today but does not have the energy at this time  Assessment   Assessment Pt received in bedside recliner, finished with breakfast  Pt agreeable to toileting and AM ADL  Pt completes all seated tasks with set up, any tasks in stance with SPV due to fatigue and balance limitations  pt demonsrtates good safety awareness and awanress of limitations  No LOB at RW level  Plan   Treatment Interventions ADL retraining;Functional transfer training;UE strengthening/ROM; Endurance training;Patient/family training;Equipment evaluation/education; Fine motor coordination activities; Compensatory technique education; Energy conservation   Goal Expiration Date 08/21/19   OT Frequency 4-1E/HW   Licensure   [place] License Number  Brenden Sharma OTR/L [id number]

## 2019-08-10 NOTE — PLAN OF CARE
Pt progressing with OT intervention  Continues to be limited bu fatigue and SOB with exertion but demonstrates improved performance

## 2019-08-10 NOTE — ASSESSMENT & PLAN NOTE
Continue cpap at 12/5  Patient noted to have some bilateral wheezing and patient will be started on Solu-Medrol 20 milligram IV q 8 hours  Possible transition to p o  Steroids  Continue duo nebs, Tussionex  Patient is on trelegy at home

## 2019-08-10 NOTE — DISCHARGE SUMMARY
Discharge- Erna Sarah 1960, 60 y o  male MRN: [id number]    Unit/Bed#: [address] [address] Encounter: [id number]    Primary Care Provider: Karolina Castro MD   Date and time admitted to hospital: 8/6/2019 10:46 PM        * Acute bronchitis  Assessment & Plan  Patient presented with worsening shortness of breath  Initially patient was admitted hospital for possible pneumonia of the right lower lobe as patient had tachypnea, tachycardia with elevated white count and elevated lactic acid level  Patient's procalcitonin level x2 was negative  Patient was initially IV cefepime and later on IV Rocephin  Patient was transitioned to Ceftin 500 milligram p o  B i d  to finish a 5 day course  Blood cultures have been negative  Urine for Legionella and streptococcal antigens have been negative    Atrial fibrillation with RVR [facility])  Assessment & Plan  Patient with history of atrial fibrillation found to be in RVR in ER  He was given metoprolol and cardizem in the ER with improvement in heart rate   Secondary to worsening respiratory status   Patient was loaded with digoxin    Continue Toprol- milligram p o  Daily and digoxin 250 microgram p o  Daily   2D echo showed EF of 40-45% with mild diffuse hypokinesis and mildly dilated left atrium-cardioversion was not attempted   Continue anticoagulation with Eliquis   Heart rate is better  Continue digoxin    Acute on chronic respiratory failure with hypoxia (HCC)  Assessment & Plan  Continue cpap QHS and continue oxygen supplementation at 2 liters/minute  Component could be related to acute bronchitis and COPD exacerbation  Started on Solu-Medrol 20 milligram IV q 8 hours  Patient will be tapered to prednisone  Continue nebulizers and antibiotics  ABG showed a pH of 7 4, pCO2 47 and PO2 of 90% on BiPAP      SHAVONNE and COPD overlap syndrome ([facility] [place] [address] )  Assessment & Plan  Continue cpap at 12/5  Patient noted to have some bilateral wheezing and patient will be started on Solu-Medrol 20 milligram IV q 8 hours  Possible transition to p o  Steroids  Continue duo nebs, Tussionex  Patient is on trelegy at home  Acute renal failure superimposed on stage 2 chronic kidney disease ([address] )  Assessment & Plan  Cr elevated at 1 51, baseline appears around 1 3  Improved with IV hydration  Creatinine back to baseline  Can restart diuretics    Type 2 diabetes mellitus with complication, with long-term current use of insulin [facility])  Assessment & Plan  Lab Results   Component Value Date    HGBA1C 8 3 (H) 04/19/2019       Recent Labs     08/09/19  2056 08/10/19  0648 08/10/19  1107 08/10/19  1613   POCGLU 384* 245* 308* 259*    Metformin initially held due to lactic acidosis  Patient initially had elevated anion gap with decreased bicarbonate-thought to have a component of DKA  Labs later normalized after receiving IV fluids  Continue Lantus 45 units subcutaneously daily with Humalog sliding scale  Continue Humalog with meals-20 units with breakfast, 17 units with lunch and 22 units with dinner  Elevated blood sugar secondary to steroids  Steroids to be tapered to p o   Prednisone  Continue Dapagliflozin, Victoza and metformin    Benign essential hypertension  Assessment & Plan  Continue cozaar and metoprolol    Morbid obesity (HCC)  Assessment & Plan  Dietary and lifestyle modifications    Esophageal reflux  Assessment & Plan  Continue PPI    CAD (coronary artery disease)  Assessment & Plan  Continue aspirin, statin, metoprolol    Bipolar affective disorder [facility])  Assessment & Plan  Continue buspar, seroquel and ambien        Discharging Physician / Practitioner: Janice Hassan MD  PCP: Wojciech Marshall MD  Admission Date: 8/6/2019  Discharge Date: 08/10/19    Reason for Admission: Shortness of Breath (Pt states shortness of breath and weakness since yesterday  )        Resolved Problems  Date Reviewed: 8/10/2019    None          Consultations During Hospital Stay:  Beena Hicks PULMONOLOGY  IP CONSULT TO CARDIOLOGY  IP CONSULT TO CASE MANAGEMENT      Significant Findings / Test Results:     ·   Results from last 7 days   Lab Units 08/08/19  0443 08/07/19  0537 08/06/19  2313   WBC Thousand/uL 5 50 8 72 14 40*   HEMOGLOBIN g/dL 14 5 14 1 17 6*   PLATELETS Thousands/uL 156 218 295     Results from last 7 days   Lab Units 08/08/19  0443 08/07/19  0537 08/06/19  2313   SODIUM mmol/L 135* 133* 132*   POTASSIUM mmol/L 4 7 4 2 4 9   CHLORIDE mmol/L 100 98* 96*   CO2 mmol/L 26 17* 21   BUN mg/dL 17 31* 39*   CREATININE mg/dL 0 81 0 94 1 51*   CALCIUM mg/dL 7 8* 9 2 10 7*   TOTAL BILIRUBIN mg/dL  --  0 30 0 30   ALK PHOS U/L  --  65 84   ALT U/L  --  35 40   AST U/L  --  16 17     Results from last 7 days   Lab Units 08/06/19  2313   INR  0 92     Results from last 7 days   Lab Units 08/06/19  2313   TROPONIN I ng/mL <0 02     Lab Results   Component Value Date/Time    HGBA1C 8 3 (H) 04/19/2019 10:19 AM    HGBA1C 8 0 07/21/2017 09:27 AM     Results from last 7 days   Lab Units 08/10/19  1613 08/10/19  1107 08/10/19  0648 08/09/19  2056 08/09/19  1604 08/09/19  1052 08/09/19  0701 08/08/19  2032 08/08/19  1621 08/08/19  1050 08/08/19  0709 08/07/19  2028   POC GLUCOSE mg/dl 259* 308* 245* 384* 261* 343* 250* 181* 165* 318* 213* 231*     Results from last 7 days   Lab Units 08/08/19  0443 08/07/19  1032 08/07/19  0159 08/06/19  2319   LACTIC ACID mmol/L  --   --  1 0 2 8*   PROCALCITONIN ng/ml 0 06 0 07  --   --      Blood Culture:   Lab Results   Component Value Date    BLOODCX No Growth at 72 hrs  08/06/2019    BLOODCX No Growth at 72 hrs  08/06/2019    BLOODCX No Growth After 5 Days  11/16/2018    BLOODCX Staphylococcus coagulase negative (A) 11/16/2018    BLOODCX No Growth After 5 Days  10/31/2018    BLOODCX No Growth After 5 Days   10/31/2018     Urine Culture:   Lab Results   Component Value Date    URINECX 0310-5493 cfu/ml Mixed Contaminants X2 03/20/2017    URINECX No Growth <1000 cfu/mL 11/27/2016    URINECX No Growth <1000 cfu/mL 09/02/2016     Sputum Culture: No components found for: SPUTUMCX  Wound Culture: No results found for: WOUNDCULT     CT chest wo contrast   Final Result by Judy Mancera MD (08/07 0023)   1  Bibasilar linear opacities compatible with atelectasis versus scarring  Increased attenuation material at the peripheral left lung base (series 2 image 45) which appears new from CT dated 10/31/2018  Correlate for history of surgical intervention in    the interval  Alternatively this may represent sequela of aspiration  Clinical and laboratory correlation is recommended  2  Hepatic steatosis  Workstation performed: [id number]         XR chest 1 view portable   Final Result by Mercy Xiao MD (08/07 0210)      Cardiomegaly  CT scan recommended to exclude an abnormality at the right lung base obscuring the right hemidiaphragm  Workstation performed: [id number]                  Outpatient Tests Requested:  · Follow-up with pulmonology and Cardiology    Complications:  None    Reason for Admission:   Chief Complaint   Patient presents with    Shortness of Breath     Pt states shortness of breath and weakness since yesterday  Hospital Course:   Sleep apnea of upper disorder  Erna Sarah is a 60 y o  male patient with a PMH of COPD, diabetes, obstructive who originally presented to the hospital on 8/6/2019 due to shortness of breath and hallucinations  In the ED patient was hypoxic on oxygen and also had elevated white count and lactic acid level  Patient was admitted hospital for possible sepsis secondary to right lower lobe pneumonia  Patient started on IV cefepime  Patient's procalcitonin level continued remained negative  Later patient had CT scan of the chest which showed no infiltrate  Patient's antibiotics were tapered down to IV Rocephin and eventually to p  Cheral Lucas    Patient also noted to be in atrial fibrillation with rapid ventricular rate and patient was loaded with digoxin and was later transitioned to p o  Digoxin with better heart rate control  Patient also received IV hydration for possible acute kidney injury and possible component of DKA with resolution  Patient later was also started on IV Solu-Medrol for COPD exacerbation and will be discharged on prednisone taper  Patient altered physical therapy during the hospital stay and patient will be discharged to short-term rehabilitation for continued physical therapy  Please see above list of diagnoses and related plan for additional information  Condition at Discharge: stable       Discharge Day Visit / Exam:     Subjective:  Patient is feeling better with breathing  Denies any further wheezing  Patient denies any chest pain, abdominal pain, nausea or vomiting  Vitals: Blood Pressure: 162/90 (08/10/19 1553)  Pulse: 94 (08/10/19 1553)  Temperature: 98 3 °F (36 8 °C) (08/10/19 1553)  Temp Source: Oral (08/10/19 1553)  Respirations: 18 (08/10/19 1553)  Height: 5' 10" (177 8 cm) (08/07/19 0226)  Weight - Scale: 127 kg (280 lb) (08/07/19 0226)  SpO2: 94 % (08/10/19 1553)  Exam:   Physical Exam   Constitutional: No distress  HENT:   Head: Normocephalic and atraumatic  Eyes: Pupils are equal, round, and reactive to light  Conjunctivae are normal    Neck: Normal range of motion  Neck supple  Cardiovascular: Normal rate and normal heart sounds  Irregularly irregular   Pulmonary/Chest: Effort normal  No respiratory distress  He has no wheezes  He has no rhonchi  He has no rales  He exhibits no tenderness  Abdominal: Soft  Bowel sounds are normal  He exhibits no distension  There is no tenderness  There is no rebound and no guarding  Musculoskeletal: He exhibits no edema  Neurological: He is alert  No cranial nerve deficit  Skin: Skin is warm and dry  No rash noted             Discharge instructions/Information to patient and family:   See after visit summary for information provided to patient and family  Provisions for Follow-Up Care:  See after visit summary for information related to follow-up care and any pertinent home health orders  Disposition:     Other [place] [facility] at [address] Readmission:  No     Discharge Statement:  I spent 35 minutes discharging the patient  This time was spent on the day of discharge  I had direct contact with the patient on the day of discharge  Greater than 50% of the total time was spent examining patient, answering all patient questions, arranging and discussing plan of care with patient as well as directly providing post-discharge instructions  Additional time then spent on discharge activities  Discharge Medications:  See after visit summary for reconciled discharge medications provided to patient and family        ** Please Note: This note has been constructed using a voice recognition system **

## 2019-08-10 NOTE — PLAN OF CARE
Problem: Potential for Falls  Goal: Patient will remain free of falls  Description  INTERVENTIONS:  - Assess patient frequently for physical needs  -  Identify cognitive and physical deficits and behaviors that affect risk of falls    -  [place] fall precautions as indicated by assessment   - Educate patient/family on patient safety including physical limitations  - Instruct patient to call for assistance with activity based on assessment  - Modify environment to reduce risk of injury  - Consider OT/PT consult to assist with strengthening/mobility  Outcome: Progressing

## 2019-08-10 NOTE — PROGRESS NOTES
Progress Note - Pulmonary   Km Scott 60 y o  male MRN: [id number]  Unit/Bed#: [address] [place] [address] Encounter: [id number]    Assessment:  Acute bronchitis resolved  Acute exacerbation of COPD improved  Mild to moderate SHAVONNE - patient compliant with his Bipap usage  Uses Bipap 12/5 with 3 liters of oxygen at home  Restrictive respiratory impairment secondary to obesity  Atrial fibrillation  Acute on chronic  hypercapnic hypoxemic respiratory failure - stabilized      Plan:  Day #4 antibiotic therapy  Changed from ceftriaxone to ceftin today and would complete 5 days of RX  Can change to PO prednisone 40 mg daily and taper from there  Can use neb tx with Duoneb QID when transferred to short term rehab    Subjective:   Breathing improved  No shortness of breath at rest    Objective:     Vitals: Blood pressure 150/81, pulse 95, temperature 98 5 °F (36 9 °C), temperature source Tympanic, resp  rate 18, height 5' 10" (1 778 m), weight 127 kg (280 lb), SpO2 97 %  ,Body mass index is 40 18 kg/m²  Intake/Output Summary (Last 24 hours) at 8/10/2019 1431  Last data filed at 8/10/2019 1000  Gross per 24 hour   Intake 2740 ml   Output 1250 ml   Net 1490 ml       Physical Exam: Physical Exam   Constitutional: He is oriented to person, place, and time  He appears well-developed and well-nourished  No distress  HENT:   Head: Normocephalic  Nose: Nose normal    Mouth/Throat: Oropharynx is clear and moist  No oropharyngeal exudate  Eyes: Pupils are equal, round, and reactive to light  Conjunctivae are normal    Neck: Neck supple  No JVD present  No tracheal deviation present  Cardiovascular: Normal rate, regular rhythm and normal heart sounds  Pulmonary/Chest: Effort normal    Lung sounds decreased but clear   Abdominal: Soft  He exhibits no distension  There is no tenderness  There is no guarding  Musculoskeletal: He exhibits no edema  Lymphadenopathy:     He has no cervical adenopathy     Neurological: He is alert and oriented to person, place, and time  Skin: Skin is warm and dry  No rash noted  Psychiatric: He has a normal mood and affect  His behavior is normal  Thought content normal         Labs: I have personally reviewed pertinent lab results  , ABG:   Lab Results   Component Value Date    PHART 7 415 08/09/2019    CYB5PXH 46 3 (H) 08/09/2019    PO2ART 90 0 08/09/2019    LKG9AKI 29 0 (H) 08/09/2019    BEART 3 7 08/09/2019    SOURCE Radial, Right 08/09/2019   , BNP: No results found for: BNP, CBC:   Lab Results   Component Value Date    WBC 5 50 08/08/2019    HGB 14 5 08/08/2019    HCT 41 9 08/08/2019    MCV 93 08/08/2019     08/08/2019    ADJUSTEDWBC 8 60 09/14/2016    MCH 32 2 08/08/2019    MCHC 34 6 08/08/2019    RDW 13 2 08/08/2019    MPV 10 5 08/08/2019    NRBC 0 08/08/2019   , CMP:   Lab Results   Component Value Date    K 4 7 08/08/2019     08/08/2019    CO2 26 08/08/2019    BUN 17 08/08/2019    CREATININE 0 81 08/08/2019    CALCIUM 7 8 (L) 08/08/2019    AST 16 08/07/2019    ALT 35 08/07/2019    ALKPHOS 65 08/07/2019    EGFR 97 08/08/2019   , PT/INR:   Lab Results   Component Value Date    INR 0 92 08/06/2019   , Troponin: No results found for: TROPONIN    Imaging and other studies: I have personally reviewed pertinent reports     and I have personally reviewed pertinent films in PACS

## 2019-08-10 NOTE — PLAN OF CARE
Problem: RESPIRATORY - ADULT  Goal: Achieves optimal ventilation and oxygenation  Description  INTERVENTIONS:  - Assess for changes in respiratory status  - Assess for changes in mentation and behavior  - Position to facilitate oxygenation and minimize respiratory effort  - Respiratory Therapy support as indicated  - Bipap 12/5 HS for SHAVONNE    8/10/2019 0513 by Rafiq Blank, RRT  Outcome: Progressing  8/10/2019 0513 by Rafiq Blank, RRT  Outcome: Progressing

## 2019-08-12 ENCOUNTER — TRANSITIONAL CARE MANAGEMENT (OUTPATIENT)
Dept: FAMILY MEDICINE CLINIC | Facility: CLINIC | Age: 59
End: 2019-08-12

## 2019-08-12 ENCOUNTER — EPISODE CHANGES (OUTPATIENT)
Dept: CASE MANAGEMENT | Facility: HOSPITAL | Age: 59
End: 2019-08-12

## 2019-08-12 ENCOUNTER — EPISODE CHANGES (OUTPATIENT)
Dept: CASE MANAGEMENT | Facility: OTHER | Age: 59
End: 2019-08-12

## 2019-08-12 LAB
BACTERIA BLD CULT: NORMAL
BACTERIA BLD CULT: NORMAL

## 2019-08-13 ENCOUNTER — PATIENT OUTREACH (OUTPATIENT)
Dept: CASE MANAGEMENT | Facility: OTHER | Age: 59
End: 2019-08-13

## 2019-08-13 NOTE — PROGRESS NOTES
Spoke with Dayday Benavidez, [facility], at [employer] [facility] and updated her on bundle information, including DRG and ELOS  Email also sent out, with [facility]'s permission, on above information  Bundle Communication Tool emailed as well and asked if this may be filled out prior to patient's discharge

## 2019-08-15 DIAGNOSIS — E11.65 UNCONTROLLED TYPE 2 DIABETES MELLITUS WITH HYPERGLYCEMIA (HCC): ICD-10-CM

## 2019-08-15 RX ORDER — BUSPIRONE HYDROCHLORIDE 10 MG/1
15 TABLET ORAL 2 TIMES DAILY
Refills: 1 | COMMUNITY
Start: 2019-08-02 | End: 2019-09-12

## 2019-08-15 RX ORDER — QUETIAPINE FUMARATE 400 MG/1
TABLET, FILM COATED ORAL
Refills: 1 | COMMUNITY
Start: 2019-08-01 | End: 2019-09-04 | Stop reason: SDUPTHER

## 2019-08-21 ENCOUNTER — PATIENT OUTREACH (OUTPATIENT)
Dept: FAMILY MEDICINE CLINIC | Facility: CLINIC | Age: 59
End: 2019-08-21

## 2019-08-22 ENCOUNTER — PATIENT OUTREACH (OUTPATIENT)
Dept: CASE MANAGEMENT | Facility: OTHER | Age: 59
End: 2019-08-22

## 2019-08-22 NOTE — PROGRESS NOTES
Email sent to Dianna Womack [facility], requesting update on patient's discharge date and disposition

## 2019-08-22 NOTE — PROGRESS NOTES
Email received from Joavnny Rice, [facility], stating patient will be discharged to home on 8/24/19

## 2019-08-26 ENCOUNTER — PATIENT OUTREACH (OUTPATIENT)
Dept: CASE MANAGEMENT | Facility: OTHER | Age: 59
End: 2019-08-26

## 2019-08-27 ENCOUNTER — PATIENT OUTREACH (OUTPATIENT)
Dept: FAMILY MEDICINE CLINIC | Facility: CLINIC | Age: 59
End: 2019-08-27

## 2019-08-27 DIAGNOSIS — Z79.4 TYPE 2 DIABETES MELLITUS WITH COMPLICATION, WITH LONG-TERM CURRENT USE OF INSULIN (HCC): ICD-10-CM

## 2019-08-27 DIAGNOSIS — E11.8 TYPE 2 DIABETES MELLITUS WITH COMPLICATION, WITH LONG-TERM CURRENT USE OF INSULIN (HCC): ICD-10-CM

## 2019-08-27 DIAGNOSIS — E11.65 UNCONTROLLED TYPE 2 DIABETES MELLITUS WITH HYPERGLYCEMIA (HCC): ICD-10-CM

## 2019-08-27 RX ORDER — METFORMIN HYDROCHLORIDE 500 MG/1
500 TABLET, EXTENDED RELEASE ORAL 2 TIMES DAILY WITH MEALS
Qty: 180 TABLET | Refills: 3 | Status: SHIPPED | OUTPATIENT
Start: 2019-08-27 | End: 2019-12-09 | Stop reason: SDUPTHER

## 2019-08-28 ENCOUNTER — PATIENT OUTREACH (OUTPATIENT)
Dept: FAMILY MEDICINE CLINIC | Facility: CLINIC | Age: 59
End: 2019-08-28

## 2019-09-04 ENCOUNTER — PATIENT OUTREACH (OUTPATIENT)
Dept: FAMILY MEDICINE CLINIC | Facility: CLINIC | Age: 59
End: 2019-09-04

## 2019-09-04 ENCOUNTER — TRANSITIONAL CARE MANAGEMENT (OUTPATIENT)
Dept: FAMILY MEDICINE CLINIC | Facility: CLINIC | Age: 59
End: 2019-09-04

## 2019-09-04 NOTE — PROGRESS NOTES
Jessica Samson is home, DC from [employer] [facility]  Admits to lingering fatigue, intermittent wheeze, SOB with walking  Is now walking with rollator instead of cane  Claims to have all medications, refuses to review them now as he is resting  We review need to use Ventoling HFA 4x/day, every day  Jessica Samson states he was "skipping this sometime " All PCP/specialty appointments are scheduled  Cardiology is Dr Agustin [facility], scheduled "next week " [facility] has released him from their service  He is back to attending [facility] therapy twice weekly  FBS today is 150  He has seen Sunita GUAN  I encourage communication with this office  Jessica Samson states he is happy if glucose is 150 or less  We will speak when appointments are attended

## 2019-09-06 ENCOUNTER — OFFICE VISIT (OUTPATIENT)
Dept: FAMILY MEDICINE CLINIC | Facility: CLINIC | Age: 59
End: 2019-09-06
Payer: MEDICARE

## 2019-09-06 VITALS
TEMPERATURE: 96.4 F | BODY MASS INDEX: 40 KG/M2 | DIASTOLIC BLOOD PRESSURE: 62 MMHG | HEIGHT: 70 IN | SYSTOLIC BLOOD PRESSURE: 90 MMHG | OXYGEN SATURATION: 97 % | WEIGHT: 279.4 LBS | RESPIRATION RATE: 18 BRPM

## 2019-09-06 DIAGNOSIS — I48.91 ATRIAL FIBRILLATION WITH RVR (HCC): ICD-10-CM

## 2019-09-06 DIAGNOSIS — K21.00 CHRONIC REFLUX ESOPHAGITIS: ICD-10-CM

## 2019-09-06 DIAGNOSIS — E11.65 UNCONTROLLED TYPE 2 DIABETES MELLITUS WITH HYPERGLYCEMIA (HCC): Chronic | ICD-10-CM

## 2019-09-06 DIAGNOSIS — G47.33 OSA AND COPD OVERLAP SYNDROME (HCC): Primary | ICD-10-CM

## 2019-09-06 DIAGNOSIS — Z91.81 RISK FOR FALLS: ICD-10-CM

## 2019-09-06 DIAGNOSIS — J44.9 OSA AND COPD OVERLAP SYNDROME (HCC): Primary | ICD-10-CM

## 2019-09-06 PROBLEM — E78.5 HYPERLIPIDEMIA: Status: ACTIVE | Noted: 2019-09-06

## 2019-09-06 PROCEDURE — 99214 OFFICE O/P EST MOD 30 MIN: CPT | Performed by: FAMILY MEDICINE

## 2019-09-10 ENCOUNTER — OFFICE VISIT (OUTPATIENT)
Dept: PULMONOLOGY | Facility: MEDICAL CENTER | Age: 59
End: 2019-09-10
Payer: MEDICARE

## 2019-09-10 VITALS
HEIGHT: 70 IN | RESPIRATION RATE: 16 BRPM | TEMPERATURE: 98.4 F | WEIGHT: 280 LBS | OXYGEN SATURATION: 94 % | DIASTOLIC BLOOD PRESSURE: 78 MMHG | SYSTOLIC BLOOD PRESSURE: 118 MMHG | HEART RATE: 94 BPM | BODY MASS INDEX: 40.09 KG/M2

## 2019-09-10 DIAGNOSIS — J96.11 CHRONIC HYPOXEMIC RESPIRATORY FAILURE (HCC): ICD-10-CM

## 2019-09-10 DIAGNOSIS — J20.9 ACUTE BRONCHITIS: ICD-10-CM

## 2019-09-10 DIAGNOSIS — J44.9 OSA AND COPD OVERLAP SYNDROME (HCC): Primary | ICD-10-CM

## 2019-09-10 DIAGNOSIS — J98.4 LUNG DISEASE, RESTRICTIVE: ICD-10-CM

## 2019-09-10 DIAGNOSIS — G47.33 OSA AND COPD OVERLAP SYNDROME (HCC): Primary | ICD-10-CM

## 2019-09-10 DIAGNOSIS — J43.2 CENTRILOBULAR EMPHYSEMA (HCC): ICD-10-CM

## 2019-09-10 PROBLEM — J96.21 ACUTE ON CHRONIC RESPIRATORY FAILURE WITH HYPOXIA (HCC): Status: RESOLVED | Noted: 2018-11-21 | Resolved: 2019-09-10

## 2019-09-10 PROBLEM — A41.9 SEPSIS DUE TO PNEUMONIA (HCC): Status: RESOLVED | Noted: 2017-03-20 | Resolved: 2019-09-10

## 2019-09-10 PROBLEM — J42 CHRONIC BRONCHITIS WITH ACUTE EXACERBATION (HCC): Status: RESOLVED | Noted: 2019-03-20 | Resolved: 2019-09-10

## 2019-09-10 PROBLEM — J18.9 SEPSIS DUE TO PNEUMONIA (HCC): Status: RESOLVED | Noted: 2017-03-20 | Resolved: 2019-09-10

## 2019-09-10 PROCEDURE — 99214 OFFICE O/P EST MOD 30 MIN: CPT | Performed by: PHYSICIAN ASSISTANT

## 2019-09-10 RX ORDER — ALBUTEROL SULFATE 90 UG/1
2 AEROSOL, METERED RESPIRATORY (INHALATION) 4 TIMES DAILY
Qty: 18 G | Refills: 3 | Status: SHIPPED | OUTPATIENT
Start: 2019-09-10 | End: 2019-11-05 | Stop reason: SDUPTHER
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
## 2019-09-10 NOTE — ASSESSMENT & PLAN NOTE
Likely secondary to morbid obesity  Patient has yet to follow up for PFT  Discussed need for follow up PFT   Pt to call to schedule

## 2019-09-10 NOTE — ASSESSMENT & PLAN NOTE
Continue 2 L NC while  Ambulating  Pt was 88% on ambulating 200 ft  Improved to 94% on 2 L NC  Was 92 percent on room air resting

## 2019-09-10 NOTE — PATIENT INSTRUCTIONS
SHAVONNE and COPD overlap:    Continue BiPAP 12/5 with 2 L cannula oxygen at night  Continue Trelegy Ellipta  Continue albuterol nebulizer as needed    Chronic hypoxemic respiratory failure:  -continue 2 L nasal cannula ambulating  -patient has portable oxygen concentrator    Follow-up in approximately 6  Contact office approximately 1 month for flu vaccine

## 2019-09-12 ENCOUNTER — OFFICE VISIT (OUTPATIENT)
Dept: ENDOCRINOLOGY | Facility: CLINIC | Age: 59
End: 2019-09-12
Payer: MEDICARE

## 2019-09-12 VITALS
SYSTOLIC BLOOD PRESSURE: 130 MMHG | WEIGHT: 281.5 LBS | HEIGHT: 70 IN | HEART RATE: 88 BPM | BODY MASS INDEX: 40.3 KG/M2 | DIASTOLIC BLOOD PRESSURE: 72 MMHG

## 2019-09-12 DIAGNOSIS — I10 BENIGN ESSENTIAL HYPERTENSION: ICD-10-CM

## 2019-09-12 DIAGNOSIS — E78.2 MIXED HYPERLIPIDEMIA: ICD-10-CM

## 2019-09-12 DIAGNOSIS — E66.01 MORBID OBESITY (HCC): ICD-10-CM

## 2019-09-12 DIAGNOSIS — E11.42 DIABETIC POLYNEUROPATHY ASSOCIATED WITH TYPE 2 DIABETES MELLITUS (HCC): ICD-10-CM

## 2019-09-12 DIAGNOSIS — E11.65 UNCONTROLLED TYPE 2 DIABETES MELLITUS WITH HYPERGLYCEMIA (HCC): Primary | ICD-10-CM

## 2019-09-12 PROCEDURE — 99215 OFFICE O/P EST HI 40 MIN: CPT | Performed by: INTERNAL MEDICINE

## 2019-09-12 NOTE — PATIENT INSTRUCTIONS
Continue metformin  mg orally twice a day   Continue Victoza 1 8 mg orally once a day  Continue farxiga 10 mg orally daily   increase basaglar to 48 units subcutaneously at bedtime   Increase NovoLog to 22 units with breakfast, 20 units with lunch, 22 units with dinner  Continue sliding scale insulin - you can use the sliding scale to correct high blood sugars even if you're not eating a meal   In that situation, please do not take mealtime insulin    In addition, please use novolog  insulin per the following sliding scale to correct high blood sugars:  BG  151-200: 1 unit  201-250: 2 units  251-300: 3 units  301-350: 4 units  > 350: 5 units  Do not correct bed time highs unless > 200 mg/dl    please send me a blood sugar log for review in 2 weeks      please have labs done as ordered   Follow-up in 6 weeks
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
## 2019-09-13 ENCOUNTER — TELEPHONE (OUTPATIENT)
Dept: PULMONOLOGY | Facility: MEDICAL CENTER | Age: 59
End: 2019-09-13

## 2019-09-13 DIAGNOSIS — J43.2 CENTRILOBULAR EMPHYSEMA (HCC): Primary | ICD-10-CM

## 2019-09-13 DIAGNOSIS — E78.5 HYPERLIPIDEMIA, UNSPECIFIED HYPERLIPIDEMIA TYPE: ICD-10-CM

## 2019-09-13 RX ORDER — ALBUTEROL SULFATE 2.5 MG/3ML
2.5 SOLUTION RESPIRATORY (INHALATION) EVERY 6 HOURS PRN
Qty: 120 VIAL | Refills: 6 | Status: SHIPPED | OUTPATIENT
Start: 2019-09-13 | End: 2019-12-06 | Stop reason: SDUPTHER

## 2019-09-13 NOTE — TELEPHONE ENCOUNTER
Patient left message stating you called in albuterol inhaler instead of albuterol nebulizer  Can you please resend

## 2019-09-14 RX ORDER — LOVASTATIN 40 MG/1
40 TABLET ORAL
Qty: 90 TABLET | Refills: 3 | Status: SHIPPED | OUTPATIENT
Start: 2019-09-14 | End: 2020-01-02

## 2019-09-18 ENCOUNTER — PATIENT OUTREACH (OUTPATIENT)
Dept: FAMILY MEDICINE CLINIC | Facility: CLINIC | Age: 59
End: 2019-09-18

## 2019-09-23 ENCOUNTER — OFFICE VISIT (OUTPATIENT)
Dept: FAMILY MEDICINE CLINIC | Facility: CLINIC | Age: 59
End: 2019-09-23
Payer: MEDICARE

## 2019-09-23 DIAGNOSIS — I48.91 ATRIAL FIBRILLATION WITH RVR (HCC): ICD-10-CM

## 2019-09-23 DIAGNOSIS — J44.9 OSA AND COPD OVERLAP SYNDROME (HCC): ICD-10-CM

## 2019-09-23 DIAGNOSIS — Z74.09 MOBILITY IMPAIRED: Primary | ICD-10-CM

## 2019-09-23 DIAGNOSIS — J40 BRONCHITIS: ICD-10-CM

## 2019-09-23 DIAGNOSIS — E11.65 UNCONTROLLED TYPE 2 DIABETES MELLITUS WITH HYPERGLYCEMIA (HCC): Chronic | ICD-10-CM

## 2019-09-23 DIAGNOSIS — G47.33 OSA AND COPD OVERLAP SYNDROME (HCC): ICD-10-CM

## 2019-09-23 DIAGNOSIS — R26.89 BALANCE PROBLEM: ICD-10-CM

## 2019-09-23 DIAGNOSIS — E11.40 DIABETIC NEUROPATHY, PAINFUL (HCC): ICD-10-CM

## 2019-09-23 PROCEDURE — 99214 OFFICE O/P EST MOD 30 MIN: CPT | Performed by: FAMILY MEDICINE

## 2019-09-23 RX ORDER — CEFUROXIME AXETIL 500 MG/1
500 TABLET ORAL EVERY 12 HOURS SCHEDULED
Qty: 14 TABLET | Refills: 0 | Status: SHIPPED | OUTPATIENT
Start: 2019-09-23 | End: 2019-09-30

## 2019-09-23 RX ORDER — ICOSAPENT ETHYL 1000 MG/1
2 CAPSULE ORAL 2 TIMES DAILY
Refills: 3 | COMMUNITY
Start: 2019-09-11 | End: 2020-05-13 | Stop reason: SDUPTHER

## 2019-09-30 VITALS
RESPIRATION RATE: 16 BRPM | SYSTOLIC BLOOD PRESSURE: 122 MMHG | DIASTOLIC BLOOD PRESSURE: 76 MMHG | TEMPERATURE: 97.9 F | WEIGHT: 272.4 LBS | BODY MASS INDEX: 39 KG/M2 | HEIGHT: 70 IN | HEART RATE: 90 BPM | OXYGEN SATURATION: 96 %

## 2019-09-30 NOTE — ASSESSMENT & PLAN NOTE
BMI Counseling: Body mass index is 39 09 kg/m²  Discussed the patient's BMI with him  The BMI is above normal  Nutrition recommendations include reducing portion sizes, decreasing overall calorie intake, reducing fast food intake, consuming healthier snacks and decreasing soda and/or juice intake  Exercise recommendations include strength training exercises  as able-pt limited secondary to current physical conditions including heart and lung disease,balance difficulties and  use of O2
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
## 2019-10-02 ENCOUNTER — PATIENT OUTREACH (OUTPATIENT)
Dept: FAMILY MEDICINE CLINIC | Facility: CLINIC | Age: 59
End: 2019-10-02

## 2019-10-02 NOTE — PROGRESS NOTES
Loly Leong has finished antibiotics for bronchitis  Productive cough remains as does chest discomfort from the cough  Denies fever, chills  Oxygen need is continuous and unchanged, medication reconciliation done for polypharmacy  Loly Leong desires a mobility device which PCP is attempting to get for him  Loly Leong has not returned to day program since bronchitis diagnosis  He has food/utilities  Denies other needs at this time  We review red flag symptoms for which he would alert PCP
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
## 2019-10-21 ENCOUNTER — TRANSCRIBE ORDERS (OUTPATIENT)
Dept: ADMINISTRATIVE | Facility: HOSPITAL | Age: 59
End: 2019-10-21

## 2019-10-21 ENCOUNTER — APPOINTMENT (OUTPATIENT)
Dept: LAB | Facility: HOSPITAL | Age: 59
End: 2019-10-21
Attending: INTERNAL MEDICINE
Payer: MEDICARE

## 2019-10-21 DIAGNOSIS — R07.9 CHEST PAIN, UNSPECIFIED TYPE: ICD-10-CM

## 2019-10-21 DIAGNOSIS — I10 ESSENTIAL HYPERTENSION, MALIGNANT: ICD-10-CM

## 2019-10-21 DIAGNOSIS — E11.649 UNCONTROLLED TYPE 2 DIABETES MELLITUS WITH HYPOGLYCEMIA, UNSPECIFIED HYPOGLYCEMIA COMA STATUS (HCC): ICD-10-CM

## 2019-10-21 DIAGNOSIS — I10 ESSENTIAL HYPERTENSION, MALIGNANT: Primary | ICD-10-CM

## 2019-10-21 DIAGNOSIS — Z79.899 ENCOUNTER FOR LONG-TERM (CURRENT) USE OF OTHER MEDICATIONS: ICD-10-CM

## 2019-10-21 DIAGNOSIS — E78.00 PURE HYPERCHOLESTEROLEMIA: ICD-10-CM

## 2019-10-21 DIAGNOSIS — E11.42 DIABETIC POLYNEUROPATHY ASSOCIATED WITH TYPE 2 DIABETES MELLITUS (HCC): ICD-10-CM

## 2019-10-21 DIAGNOSIS — R06.02 SHORTNESS OF BREATH: ICD-10-CM

## 2019-10-21 DIAGNOSIS — E11.65 UNCONTROLLED TYPE 2 DIABETES MELLITUS WITH HYPERGLYCEMIA (HCC): ICD-10-CM

## 2019-10-21 DIAGNOSIS — E78.2 MIXED HYPERLIPIDEMIA: ICD-10-CM

## 2019-10-21 DIAGNOSIS — E13.42 DIABETIC POLYNEUROPATHY ASSOCIATED WITH OTHER SPECIFIED DIABETES MELLITUS (HCC): ICD-10-CM

## 2019-10-21 DIAGNOSIS — E66.01 MORBID OBESITY (HCC): ICD-10-CM

## 2019-10-21 DIAGNOSIS — I10 BENIGN ESSENTIAL HYPERTENSION: ICD-10-CM

## 2019-10-21 DIAGNOSIS — E11.9 DIABETES MELLITUS WITHOUT COMPLICATION (HCC): ICD-10-CM

## 2019-10-21 DIAGNOSIS — E78.00 PURE HYPERCHOLESTEROLEMIA: Primary | ICD-10-CM

## 2019-10-21 LAB
ANION GAP SERPL CALCULATED.3IONS-SCNC: 12 MMOL/L (ref 4–13)
BUN SERPL-MCNC: 23 MG/DL (ref 5–25)
CALCIUM SERPL-MCNC: 8.7 MG/DL (ref 8.3–10.1)
CHLORIDE SERPL-SCNC: 98 MMOL/L (ref 100–108)
CHOLEST SERPL-MCNC: 98 MG/DL (ref 50–200)
CO2 SERPL-SCNC: 28 MMOL/L (ref 21–32)
CREAT SERPL-MCNC: 0.99 MG/DL (ref 0.6–1.3)
CREAT UR-MCNC: 51.3 MG/DL
DIGOXIN SERPL-MCNC: 0.5 NG/ML (ref 0.8–2)
EST. AVERAGE GLUCOSE BLD GHB EST-MCNC: 160 MG/DL
GFR SERPL CREATININE-BSD FRML MDRD: 82 ML/MIN/1.73SQ M
GLUCOSE P FAST SERPL-MCNC: 200 MG/DL (ref 65–99)
HBA1C MFR BLD: 7.2 % (ref 4.2–6.3)
HDLC SERPL-MCNC: 30 MG/DL (ref 40–60)
MICROALBUMIN UR-MCNC: <5 MG/L (ref 0–20)
MICROALBUMIN/CREAT 24H UR: <10 MG/G CREATININE (ref 0–30)
NONHDLC SERPL-MCNC: 68 MG/DL
NT-PROBNP SERPL-MCNC: 208 PG/ML
POTASSIUM SERPL-SCNC: 4.2 MMOL/L (ref 3.5–5.3)
SODIUM SERPL-SCNC: 138 MMOL/L (ref 136–145)
TRIGL SERPL-MCNC: 452 MG/DL

## 2019-10-21 PROCEDURE — 83880 ASSAY OF NATRIURETIC PEPTIDE: CPT

## 2019-10-21 PROCEDURE — 82043 UR ALBUMIN QUANTITATIVE: CPT | Performed by: INTERNAL MEDICINE

## 2019-10-21 PROCEDURE — 80061 LIPID PANEL: CPT

## 2019-10-21 PROCEDURE — 82570 ASSAY OF URINE CREATININE: CPT | Performed by: INTERNAL MEDICINE

## 2019-10-21 PROCEDURE — 80048 BASIC METABOLIC PNL TOTAL CA: CPT | Performed by: INTERNAL MEDICINE

## 2019-10-21 PROCEDURE — 36415 COLL VENOUS BLD VENIPUNCTURE: CPT | Performed by: INTERNAL MEDICINE

## 2019-10-21 PROCEDURE — 80162 ASSAY OF DIGOXIN TOTAL: CPT

## 2019-10-21 PROCEDURE — 83036 HEMOGLOBIN GLYCOSYLATED A1C: CPT | Performed by: INTERNAL MEDICINE

## 2019-10-24 ENCOUNTER — TELEPHONE (OUTPATIENT)
Dept: ENDOCRINOLOGY | Facility: CLINIC | Age: 59
End: 2019-10-24

## 2019-10-24 DIAGNOSIS — E11.65 UNCONTROLLED TYPE 2 DIABETES MELLITUS WITH HYPERGLYCEMIA (HCC): ICD-10-CM

## 2019-10-24 NOTE — TELEPHONE ENCOUNTER
Spoke to patient, Rafael report reviewed  1  Increase basaglar to 52 units at bedtime  2  Increase Novolog at dinner to 25 units  3  Continue rest as is  4   Keep f/u appt for 10/28

## 2019-10-28 ENCOUNTER — OFFICE VISIT (OUTPATIENT)
Dept: ENDOCRINOLOGY | Facility: CLINIC | Age: 59
End: 2019-10-28
Payer: MEDICARE

## 2019-10-28 VITALS
BODY MASS INDEX: 39.94 KG/M2 | WEIGHT: 279 LBS | DIASTOLIC BLOOD PRESSURE: 80 MMHG | SYSTOLIC BLOOD PRESSURE: 124 MMHG | HEART RATE: 85 BPM | HEIGHT: 70 IN

## 2019-10-28 DIAGNOSIS — E66.2 CLASS 3 OBESITY WITH ALVEOLAR HYPOVENTILATION, SERIOUS COMORBIDITY, AND BODY MASS INDEX (BMI) OF 40.0 TO 44.9 IN ADULT (HCC): ICD-10-CM

## 2019-10-28 DIAGNOSIS — E11.42 DIABETIC POLYNEUROPATHY ASSOCIATED WITH TYPE 2 DIABETES MELLITUS (HCC): Primary | ICD-10-CM

## 2019-10-28 DIAGNOSIS — J44.9 OSA AND COPD OVERLAP SYNDROME (HCC): ICD-10-CM

## 2019-10-28 DIAGNOSIS — R53.83 FATIGUE, UNSPECIFIED TYPE: ICD-10-CM

## 2019-10-28 DIAGNOSIS — E55.9 VITAMIN D DEFICIENCY: ICD-10-CM

## 2019-10-28 DIAGNOSIS — G47.33 OSA AND COPD OVERLAP SYNDROME (HCC): ICD-10-CM

## 2019-10-28 DIAGNOSIS — J96.11 CHRONIC HYPOXEMIC RESPIRATORY FAILURE (HCC): ICD-10-CM

## 2019-10-28 DIAGNOSIS — E78.2 MIXED HYPERLIPIDEMIA: ICD-10-CM

## 2019-10-28 DIAGNOSIS — Z86.39 H/O VITAMIN D DEFICIENCY: ICD-10-CM

## 2019-10-28 DIAGNOSIS — I25.10 CORONARY ARTERY DISEASE INVOLVING NATIVE HEART, ANGINA PRESENCE UNSPECIFIED, UNSPECIFIED VESSEL OR LESION TYPE: ICD-10-CM

## 2019-10-28 PROCEDURE — 99215 OFFICE O/P EST HI 40 MIN: CPT | Performed by: INTERNAL MEDICINE

## 2019-10-28 RX ORDER — QUETIAPINE FUMARATE 100 MG/1
TABLET, FILM COATED ORAL
Refills: 0 | COMMUNITY
Start: 2019-09-27 | End: 2020-02-02 | Stop reason: ALTCHOICE

## 2019-10-28 RX ORDER — INSULIN ASPART 100 [IU]/ML
INJECTION, SOLUTION INTRAVENOUS; SUBCUTANEOUS
Refills: 2 | COMMUNITY
Start: 2019-09-30 | End: 2019-12-09

## 2019-10-28 RX ORDER — QUETIAPINE FUMARATE 300 MG/1
TABLET, FILM COATED ORAL
Refills: 0 | COMMUNITY
Start: 2019-09-27 | End: 2020-02-02 | Stop reason: ALTCHOICE
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
## 2019-10-31 DIAGNOSIS — E11.8 TYPE 2 DIABETES MELLITUS WITH COMPLICATION, WITH LONG-TERM CURRENT USE OF INSULIN (HCC): ICD-10-CM

## 2019-10-31 DIAGNOSIS — Z79.4 TYPE 2 DIABETES MELLITUS WITH COMPLICATION, WITH LONG-TERM CURRENT USE OF INSULIN (HCC): ICD-10-CM

## 2019-11-05 DIAGNOSIS — J43.2 CENTRILOBULAR EMPHYSEMA (HCC): ICD-10-CM

## 2019-11-07 DIAGNOSIS — E11.42 DIABETIC POLYNEUROPATHY ASSOCIATED WITH TYPE 2 DIABETES MELLITUS (HCC): ICD-10-CM

## 2019-11-08 ENCOUNTER — PATIENT OUTREACH (OUTPATIENT)
Dept: CASE MANAGEMENT | Facility: OTHER | Age: 59
End: 2019-11-08

## 2019-11-08 DIAGNOSIS — Z78.9 NEEDS ASSISTANCE WITH COMMUNITY RESOURCES: Primary | ICD-10-CM

## 2019-11-08 NOTE — PROGRESS NOTES
Patient states his a-fib continues to "bother" him  He reports feeling intermittent palpitations or a rapid heart beat  He is following with cardiology for continued management  His BS continues to be in the 150s  He reports a poor appetite & usually skips breakfast & lunch just eating dinner  He c/o continued tiredness & fatigue, but does sleep 8hrs/night & reports wearing his BiPAP for about 7hrs/night  He states the BiPAP was recently serviced  He denies SOB, but an occasional productive cough persists with a small amount of clear mucus  Reviewed red flags & when to notify a provider, patient verbalizes understanding  Educated patient on diet & nutrition especially in the setting of DM  He will try to eat small meals throughout the day  He is also interested in trying diabetic supplement drinks  He now has a Hover[employer] to assist with mobility & independence  Contacted the [employer] on behalf of the patient  They state supplements are provided through [employer]  Made patient aware, he states he applied while at [facility], but was told he didn't qualify  Referred to [facility] to see if he can reapply  He is also interested in meals on wheels

## 2019-11-20 ENCOUNTER — TELEPHONE (OUTPATIENT)
Dept: ENDOCRINOLOGY | Facility: CLINIC | Age: 59
End: 2019-11-20

## 2019-11-20 NOTE — TELEPHONE ENCOUNTER
Called and spoke to patient, advised Dr Jewels Taylor would like recent [facility] report  He stated that he placed it in the  mail to the office a day or so ago

## 2019-11-22 ENCOUNTER — PATIENT OUTREACH (OUTPATIENT)
Dept: CASE MANAGEMENT | Facility: OTHER | Age: 59
End: 2019-11-22

## 2019-11-29 ENCOUNTER — TELEPHONE (OUTPATIENT)
Dept: ENDOCRINOLOGY | Facility: CLINIC | Age: 59
End: 2019-11-29

## 2019-11-29 NOTE — TELEPHONE ENCOUNTER
Spoke to patient Elvis Salomón report reviewed  Overall improved but still having highs and a few lows    Asked why lows are occurring, did he eat less, are you eating one meal?    Patient stated that he hasn't had an appetite, he has only been eating dinner, he usually skips breakfast and lunch  He will not take any insulin if he is not eating

## 2019-12-02 ENCOUNTER — TELEPHONE (OUTPATIENT)
Dept: FAMILY MEDICINE CLINIC | Facility: CLINIC | Age: 59
End: 2019-12-02

## 2019-12-02 ENCOUNTER — PATIENT OUTREACH (OUTPATIENT)
Dept: CASE MANAGEMENT | Facility: OTHER | Age: 59
End: 2019-12-02

## 2019-12-02 NOTE — TELEPHONE ENCOUNTER
Dr Jose Hernandez:    Regan Levine would like for you to supply a note stating that patient's health is declining for medicare so she could get the proper assistance that he needs  Patient does live alone and she would like to arrange food delivery, home health aide and lifeline  She is also working on getting him into senior living  He is currently on the 2nd floor of his apartment building and he's on oxygen  Any questions please [phone] with any questions/concerns

## 2019-12-02 NOTE — PROGRESS NOTES
Went for home visit  Patient doing ok  On continuous oxygen, has nebulizer and inhalers and is using appropriately  Has all medications and takes as prescribed  Will be meeting with represenative from pill pack on 12/3 to discuss starting this procedure so it is easier for patient to take medications daily  Patient has  Ancelmo Vizcarra from [facility] who will visit 12/3  Patient is on Medicaid and [employer]  [facility] will contact PCP to write letter on patients behalf indicating declining health to submit to [employer] for [employer]  Patient has food and is able to do [employer] delivery to home  Has roommate but does not assist with anything for patient  Patient states he feels he needs a HHA, lifeline and meal delivery  [facility] discussed monthly income and what [employer] could offer  Patient very interested and willing to complete necessary paperwork to get the assistance he needs  [facility] will continue to assist and complete [employer] application  No further needs at today's home visit  Scheduled 1 week home  follow up visit

## 2019-12-04 NOTE — TELEPHONE ENCOUNTER
Spoke to patient, kelley Barbour [facility] report reviewed, continue current regimen  Pt stated he still not eating a lot, he still has no appetite

## 2019-12-04 NOTE — TELEPHONE ENCOUNTER
Sheba report 11/14-11/27 reviewed  Patient has not had any further hypoglycemia  Average blood sugar 166 mg/dL    Continue current regimen

## 2019-12-04 NOTE — TELEPHONE ENCOUNTER
Please advise patient to stop farxiga for now since he is not eating  If his BG trend up, will increase insulin doses as needed  If patient has any other symptoms-nausea, vomiting of please advised patient to go to the ER so that appropriate investigations, workup can be done  Please advise patient to also reach out to his primary care physician  Victoza can also cause appetite suppression and so for now, it may be reasonable to discontinue that as well  Please advised patient to come for a sooner follow-up so that we can make appropriate changes to his regimen

## 2019-12-06 DIAGNOSIS — J43.2 CENTRILOBULAR EMPHYSEMA (HCC): ICD-10-CM

## 2019-12-06 RX ORDER — ALBUTEROL SULFATE 2.5 MG/3ML
2.5 SOLUTION RESPIRATORY (INHALATION) EVERY 6 HOURS PRN
Qty: 120 VIAL | Refills: 6 | Status: SHIPPED | OUTPATIENT
Start: 2019-12-06 | End: 2020-10-02 | Stop reason: HOSPADM

## 2019-12-09 ENCOUNTER — PATIENT OUTREACH (OUTPATIENT)
Dept: CASE MANAGEMENT | Facility: OTHER | Age: 59
End: 2019-12-09

## 2019-12-09 DIAGNOSIS — E11.8 TYPE 2 DIABETES MELLITUS WITH COMPLICATION, WITH LONG-TERM CURRENT USE OF INSULIN (HCC): ICD-10-CM

## 2019-12-09 DIAGNOSIS — E11.65 UNCONTROLLED TYPE 2 DIABETES MELLITUS WITH HYPERGLYCEMIA (HCC): ICD-10-CM

## 2019-12-09 DIAGNOSIS — Z79.4 TYPE 2 DIABETES MELLITUS WITH COMPLICATION, WITH LONG-TERM CURRENT USE OF INSULIN (HCC): ICD-10-CM

## 2019-12-09 DIAGNOSIS — E11.42 DIABETIC POLYNEUROPATHY ASSOCIATED WITH TYPE 2 DIABETES MELLITUS (HCC): ICD-10-CM

## 2019-12-09 RX ORDER — METFORMIN HYDROCHLORIDE 500 MG/1
500 TABLET, EXTENDED RELEASE ORAL 2 TIMES DAILY WITH MEALS
Qty: 180 TABLET | Refills: 3 | Status: SHIPPED | OUTPATIENT
Start: 2019-12-09 | End: 2020-12-03

## 2019-12-09 NOTE — PROGRESS NOTES
Went for home visit, patient doing well  States he had breakfast and took all appropriate medications  Patient had visit from Carroll Baker [address] worker and pill pack rep  Patient will be receiving medications through pill pack  Currently has all medications in pill box and insulin, nebulizer, oxygen and cpap equipment on shelf with pill box  Patient uses oxygen continuously  Has supplies from [facility] [address]  Has food in home and uses [employer] delivery  [facility] and patient completed [employer] assessment on phone and patient qualified  Patient will have  come out to assess and assist with meal delivery, lifeline, transportation, medical equipment, HHA and any other qualifying services  [facility] will continue to assist for needs

## 2019-12-12 ENCOUNTER — OFFICE VISIT (OUTPATIENT)
Dept: ENDOCRINOLOGY | Facility: CLINIC | Age: 59
End: 2019-12-12
Payer: MEDICARE

## 2019-12-12 VITALS
HEART RATE: 68 BPM | DIASTOLIC BLOOD PRESSURE: 90 MMHG | SYSTOLIC BLOOD PRESSURE: 138 MMHG | WEIGHT: 269 LBS | BODY MASS INDEX: 38.51 KG/M2 | HEIGHT: 70 IN

## 2019-12-12 DIAGNOSIS — Z79.4 TYPE 2 DIABETES MELLITUS WITH COMPLICATION, WITH LONG-TERM CURRENT USE OF INSULIN (HCC): ICD-10-CM

## 2019-12-12 DIAGNOSIS — R53.83 FATIGUE, UNSPECIFIED TYPE: ICD-10-CM

## 2019-12-12 DIAGNOSIS — G47.33 OSA AND COPD OVERLAP SYNDROME (HCC): ICD-10-CM

## 2019-12-12 DIAGNOSIS — J44.9 OSA AND COPD OVERLAP SYNDROME (HCC): ICD-10-CM

## 2019-12-12 DIAGNOSIS — E11.42 DIABETIC POLYNEUROPATHY ASSOCIATED WITH TYPE 2 DIABETES MELLITUS (HCC): ICD-10-CM

## 2019-12-12 DIAGNOSIS — E55.9 VITAMIN D DEFICIENCY: ICD-10-CM

## 2019-12-12 DIAGNOSIS — E11.8 TYPE 2 DIABETES MELLITUS WITH COMPLICATION, WITH LONG-TERM CURRENT USE OF INSULIN (HCC): ICD-10-CM

## 2019-12-12 DIAGNOSIS — I10 BENIGN ESSENTIAL HYPERTENSION: ICD-10-CM

## 2019-12-12 DIAGNOSIS — I25.10 CORONARY ARTERY DISEASE INVOLVING NATIVE HEART, ANGINA PRESENCE UNSPECIFIED, UNSPECIFIED VESSEL OR LESION TYPE: ICD-10-CM

## 2019-12-12 DIAGNOSIS — E11.65 UNCONTROLLED TYPE 2 DIABETES MELLITUS WITH HYPERGLYCEMIA (HCC): Primary | Chronic | ICD-10-CM

## 2019-12-12 PROCEDURE — 99215 OFFICE O/P EST HI 40 MIN: CPT | Performed by: INTERNAL MEDICINE

## 2019-12-12 RX ORDER — INSULIN GLARGINE 100 [IU]/ML
INJECTION, SOLUTION SUBCUTANEOUS
Qty: 15 ML | Refills: 3 | OUTPATIENT
Start: 2019-12-12

## 2019-12-12 NOTE — PATIENT INSTRUCTIONS
Please take basaglar 48 units subcutaneously at bedtime consistently   If you feel like your blood sugars continue to trend down specially after steroid effect has weared off,  Please take a consistent dose daily example 45 units   continue NovoLog 20 units subcutaneously with meals and sliding scale insulin  For now please use 2 units for every 50 points greater than 150, in the next few days once steroid effect has weared off, please go back to using the prior sliding scale     please send me a blood sugar log for review in 2 weeks   You are being referred for diabetes education to discuss different insulin actions and relationship to food  Please get labs    Follow up in 2 months
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
## 2019-12-16 ENCOUNTER — PATIENT OUTREACH (OUTPATIENT)
Dept: CASE MANAGEMENT | Facility: OTHER | Age: 59
End: 2019-12-16

## 2019-12-16 ENCOUNTER — TELEPHONE (OUTPATIENT)
Dept: CARDIOLOGY CLINIC | Facility: CLINIC | Age: 59
End: 2019-12-16

## 2019-12-16 NOTE — PROGRESS NOTES
Went for home visit, patient doing okay  Patient was organizing medication that he just received with [facility] through pill pack  Patient sitting in chair on oxygen  Patient advised CHW a  from [address] [place] [address] Rd will be coming to do in home interview on 12/30  CHW will make appointment at the same time to assist with any questions and support for patient  Patient said he was having some palpitations CHW suggested he call PCP and ask what they want him to do? CHW will also advise RN case manager of situation  No other issues or needs at this visit  Next home visit CHW and patient will contact [facility] to follow up on application  CHW will continue to assist and monitor for needs

## 2019-12-16 NOTE — PROGRESS NOTES
Received an in basket message from patient's CHW stating that patient c/o elevated heart rate the previous night, but was currently 82 via pulse oximeter  She advised he call his PCP  Called patient to check in  He states he's been having more frequent palpitations & also experiencing chest pressure at times  Advised him to call cardio now & call me back to confirm contact  Patient returned my call  He is waiting to hear back from a covering doc  He denies palpitations, chest pain, increased SOB, lightheadedness, or dizziness at this time  Advised if symptoms return or worsen to go to the ED

## 2019-12-17 ENCOUNTER — PATIENT OUTREACH (OUTPATIENT)
Dept: CASE MANAGEMENT | Facility: OTHER | Age: 59
End: 2019-12-17

## 2019-12-17 NOTE — TELEPHONE ENCOUNTER
Spoke w/Alonzo  He states, he's feeling better today w/HR between 70-90  Patient added, he relies on public transportation and wouldn't be able to make an appt today  However, patient verbalizes understanding that in the event he should have symptoms/worsening to go to the ED  Pt agreed  Pt stated, he will keep an appt  for 1/2nd and or call us sooner as needed

## 2019-12-17 NOTE — PROGRESS NOTES
Followed up with the patient  No reoccurrence of tachycardia or palpitations  He states continued chest pressure at times & does have an occasional productive cough with a small amount of white mucus  He denies fever, aches, chills, wheeze, or increased SOB  He will call his PCP or pulmonary for worsening respiratory symptoms

## 2019-12-19 ENCOUNTER — PATIENT OUTREACH (OUTPATIENT)
Dept: CASE MANAGEMENT | Facility: OTHER | Age: 59
End: 2019-12-19

## 2019-12-20 ENCOUNTER — TRANSCRIBE ORDERS (OUTPATIENT)
Dept: ADMINISTRATIVE | Facility: HOSPITAL | Age: 59
End: 2019-12-20

## 2019-12-20 ENCOUNTER — PATIENT OUTREACH (OUTPATIENT)
Dept: CASE MANAGEMENT | Facility: OTHER | Age: 59
End: 2019-12-20

## 2019-12-20 DIAGNOSIS — R07.9 CHEST PAIN, UNSPECIFIED TYPE: Primary | ICD-10-CM

## 2019-12-20 NOTE — PROGRESS NOTES
Spoke to CHW who stated [facility] contacted her, as did patient, regarding his acceptance in housing  However, patient needed to provide additional documents before the process would be complete & he could move in  She contacted the patient to confirm he could/would mail that in, but did not receive clarification  Called the patient & he states he mailed the requested documents yesterday  He will be moving in January  He states occasional palpitations persist  He denies chest pressure, pain, or tightness, increased SOB, lightheadedness, dizziness, weakness, or tachycardia

## 2019-12-23 ENCOUNTER — PATIENT OUTREACH (OUTPATIENT)
Dept: CASE MANAGEMENT | Facility: OTHER | Age: 59
End: 2019-12-23

## 2019-12-24 NOTE — PROGRESS NOTES
Went for home visit, patient doing well has no complaints  Is using pill pack and really likes them  Hs oxygen 24 hours, cpap at night and uses nebulizer 2x per day  Has scheduled all appointments and transportation with [employer]  Has been accepted with [facility] and will be moving in after new year  No needs at this home visit, next visit patient and CHW will meet with  from [facility] to discuss approval of [facility] program  Scheduled following week appointment

## 2019-12-26 ENCOUNTER — APPOINTMENT (OUTPATIENT)
Dept: LAB | Facility: HOSPITAL | Age: 59
End: 2019-12-26
Attending: INTERNAL MEDICINE
Payer: MEDICARE

## 2019-12-26 ENCOUNTER — TRANSCRIBE ORDERS (OUTPATIENT)
Dept: ADMINISTRATIVE | Facility: HOSPITAL | Age: 59
End: 2019-12-26

## 2019-12-26 ENCOUNTER — HOSPITAL ENCOUNTER (OUTPATIENT)
Dept: RADIOLOGY | Facility: HOSPITAL | Age: 59
Discharge: HOME | End: 2019-12-26
Payer: MEDICARE

## 2019-12-26 DIAGNOSIS — I10 ESSENTIAL HYPERTENSION, MALIGNANT: ICD-10-CM

## 2019-12-26 DIAGNOSIS — E11.65 UNCONTROLLED TYPE 2 DIABETES MELLITUS WITH HYPERGLYCEMIA (HCC): ICD-10-CM

## 2019-12-26 DIAGNOSIS — E78.00 PURE HYPERCHOLESTEROLEMIA: ICD-10-CM

## 2019-12-26 DIAGNOSIS — R06.02 SHORTNESS OF BREATH: ICD-10-CM

## 2019-12-26 DIAGNOSIS — E78.2 MIXED HYPERLIPIDEMIA: ICD-10-CM

## 2019-12-26 DIAGNOSIS — N17.9 AKI (ACUTE KIDNEY INJURY) (HCC): ICD-10-CM

## 2019-12-26 DIAGNOSIS — Z79.899 ENCOUNTER FOR LONG-TERM (CURRENT) USE OF OTHER MEDICATIONS: ICD-10-CM

## 2019-12-26 DIAGNOSIS — R53.83 FATIGUE, UNSPECIFIED TYPE: ICD-10-CM

## 2019-12-26 DIAGNOSIS — R07.9 CHEST PAIN, UNSPECIFIED TYPE: ICD-10-CM

## 2019-12-26 DIAGNOSIS — E78.00 PURE HYPERCHOLESTEROLEMIA: Primary | ICD-10-CM

## 2019-12-26 DIAGNOSIS — E11.42 DIABETIC POLYNEUROPATHY ASSOCIATED WITH TYPE 2 DIABETES MELLITUS (HCC): ICD-10-CM

## 2019-12-26 DIAGNOSIS — E55.9 VITAMIN D DEFICIENCY: ICD-10-CM

## 2019-12-26 LAB
25(OH)D3 SERPL-MCNC: 34.3 NG/ML (ref 30–100)
ALBUMIN SERPL BCP-MCNC: 3.7 G/DL (ref 3.5–5)
ALP SERPL-CCNC: 72 U/L (ref 46–116)
ALT SERPL W P-5'-P-CCNC: 29 U/L (ref 12–78)
ANION GAP SERPL CALCULATED.3IONS-SCNC: 11 MMOL/L (ref 4–13)
AST SERPL W P-5'-P-CCNC: 14 U/L (ref 5–45)
BASOPHILS # BLD AUTO: 0.04 THOUSANDS/ΜL (ref 0–0.1)
BASOPHILS NFR BLD AUTO: 0 % (ref 0–1)
BILIRUB DIRECT SERPL-MCNC: 0.12 MG/DL (ref 0–0.2)
BILIRUB SERPL-MCNC: 0.4 MG/DL (ref 0.2–1)
BUN SERPL-MCNC: 26 MG/DL (ref 5–25)
CALCIUM SERPL-MCNC: 8.6 MG/DL (ref 8.3–10.1)
CHLORIDE SERPL-SCNC: 97 MMOL/L (ref 100–108)
CHOLEST SERPL-MCNC: 143 MG/DL (ref 50–200)
CK SERPL-CCNC: 65 U/L (ref 39–308)
CO2 SERPL-SCNC: 26 MMOL/L (ref 21–32)
CORTIS AM PEAK SERPL-MCNC: 2.6 UG/DL (ref 4.2–22.4)
CREAT SERPL-MCNC: 0.95 MG/DL (ref 0.6–1.3)
CREAT UR-MCNC: 65.5 MG/DL
EOSINOPHIL # BLD AUTO: 0.11 THOUSAND/ΜL (ref 0–0.61)
EOSINOPHIL NFR BLD AUTO: 1 % (ref 0–6)
ERYTHROCYTE [DISTWIDTH] IN BLOOD BY AUTOMATED COUNT: 12.2 % (ref 11.6–15.1)
EST. AVERAGE GLUCOSE BLD GHB EST-MCNC: 177 MG/DL
GFR SERPL CREATININE-BSD FRML MDRD: 87 ML/MIN/1.73SQ M
GLUCOSE P FAST SERPL-MCNC: 257 MG/DL (ref 65–99)
HBA1C MFR BLD: 7.8 % (ref 4.2–6.3)
HCT VFR BLD AUTO: 45.7 % (ref 36.5–49.3)
HDLC SERPL-MCNC: 37 MG/DL
HGB BLD-MCNC: 15.8 G/DL (ref 12–17)
IMM GRANULOCYTES # BLD AUTO: 0.08 THOUSAND/UL (ref 0–0.2)
IMM GRANULOCYTES NFR BLD AUTO: 1 % (ref 0–2)
LYMPHOCYTES # BLD AUTO: 3.93 THOUSANDS/ΜL (ref 0.6–4.47)
LYMPHOCYTES NFR BLD AUTO: 40 % (ref 14–44)
MCH RBC QN AUTO: 31.8 PG (ref 26.8–34.3)
MCHC RBC AUTO-ENTMCNC: 34.6 G/DL (ref 31.4–37.4)
MCV RBC AUTO: 92 FL (ref 82–98)
MICROALBUMIN UR-MCNC: <5 MG/L (ref 0–20)
MICROALBUMIN/CREAT 24H UR: <8 MG/G CREATININE (ref 0–30)
MONOCYTES # BLD AUTO: 0.63 THOUSAND/ΜL (ref 0.17–1.22)
MONOCYTES NFR BLD AUTO: 6 % (ref 4–12)
NEUTROPHILS # BLD AUTO: 5.12 THOUSANDS/ΜL (ref 1.85–7.62)
NEUTS SEG NFR BLD AUTO: 52 % (ref 43–75)
NONHDLC SERPL-MCNC: 106 MG/DL
NRBC BLD AUTO-RTO: 0 /100 WBCS
NT-PROBNP SERPL-MCNC: 291 PG/ML
PLATELET # BLD AUTO: 167 THOUSANDS/UL (ref 149–390)
PMV BLD AUTO: 10.2 FL (ref 8.9–12.7)
POTASSIUM SERPL-SCNC: 4.1 MMOL/L (ref 3.5–5.3)
PROT SERPL-MCNC: 6.8 G/DL (ref 6.4–8.2)
RBC # BLD AUTO: 4.97 MILLION/UL (ref 3.88–5.62)
SODIUM SERPL-SCNC: 134 MMOL/L (ref 136–145)
TRIGL SERPL-MCNC: 530 MG/DL
WBC # BLD AUTO: 9.91 THOUSAND/UL (ref 4.31–10.16)

## 2019-12-26 PROCEDURE — 82570 ASSAY OF URINE CREATININE: CPT

## 2019-12-26 PROCEDURE — 80048 BASIC METABOLIC PNL TOTAL CA: CPT

## 2019-12-26 PROCEDURE — 82550 ASSAY OF CK (CPK): CPT

## 2019-12-26 PROCEDURE — 83036 HEMOGLOBIN GLYCOSYLATED A1C: CPT

## 2019-12-26 PROCEDURE — 71250 CT THORAX DX C-: CPT

## 2019-12-26 PROCEDURE — 80076 HEPATIC FUNCTION PANEL: CPT

## 2019-12-26 PROCEDURE — 82533 TOTAL CORTISOL: CPT

## 2019-12-26 PROCEDURE — 80061 LIPID PANEL: CPT

## 2019-12-26 PROCEDURE — 85025 COMPLETE CBC W/AUTO DIFF WBC: CPT

## 2019-12-26 PROCEDURE — 82043 UR ALBUMIN QUANTITATIVE: CPT

## 2019-12-26 PROCEDURE — 36415 COLL VENOUS BLD VENIPUNCTURE: CPT

## 2019-12-26 PROCEDURE — 83880 ASSAY OF NATRIURETIC PEPTIDE: CPT

## 2019-12-26 PROCEDURE — 82306 VITAMIN D 25 HYDROXY: CPT

## 2019-12-27 ENCOUNTER — TELEPHONE (OUTPATIENT)
Dept: ENDOCRINOLOGY | Facility: CLINIC | Age: 59
End: 2019-12-27

## 2019-12-27 DIAGNOSIS — R79.89 ABNORMAL CORTISOL LEVEL: Primary | ICD-10-CM

## 2019-12-27 NOTE — TELEPHONE ENCOUNTER
Called and spoke with Fiordaliza Mcknight to discuss recent lab/imaging results  AM Cortisol low at 2 6 , previously within normal limits in 2018 at 23  Not on any steroids at this time; last a few months ago  Will repeat a m  Cortisol, also check ACTH  If low again to this extent, will not need ACTH stimulation, will start glucocorticoids

## 2019-12-30 ENCOUNTER — HOSPITAL ENCOUNTER (EMERGENCY)
Facility: HOSPITAL | Age: 59
Discharge: HOME | End: 2019-12-30
Attending: EMERGENCY MEDICINE | Admitting: EMERGENCY MEDICINE
Payer: MEDICARE

## 2019-12-30 ENCOUNTER — APPOINTMENT (EMERGENCY)
Dept: RADIOLOGY | Facility: HOSPITAL | Age: 59
End: 2019-12-30
Payer: MEDICARE

## 2019-12-30 ENCOUNTER — TRANSCRIBE ORDERS (OUTPATIENT)
Dept: ADMINISTRATIVE | Facility: HOSPITAL | Age: 59
End: 2019-12-30

## 2019-12-30 ENCOUNTER — PATIENT OUTREACH (OUTPATIENT)
Dept: CASE MANAGEMENT | Facility: OTHER | Age: 59
End: 2019-12-30

## 2019-12-30 VITALS
DIASTOLIC BLOOD PRESSURE: 96 MMHG | HEIGHT: 70 IN | RESPIRATION RATE: 17 BRPM | TEMPERATURE: 97.6 F | HEART RATE: 82 BPM | BODY MASS INDEX: 38.65 KG/M2 | OXYGEN SATURATION: 96 % | SYSTOLIC BLOOD PRESSURE: 140 MMHG | WEIGHT: 270 LBS

## 2019-12-30 DIAGNOSIS — I48.91 A-FIB (HCC): ICD-10-CM

## 2019-12-30 DIAGNOSIS — E13.42 DIABETIC POLYNEUROPATHY ASSOCIATED WITH OTHER SPECIFIED DIABETES MELLITUS (HCC): Primary | ICD-10-CM

## 2019-12-30 DIAGNOSIS — R07.9 CHEST PAIN: Primary | ICD-10-CM

## 2019-12-30 LAB
ALBUMIN SERPL BCP-MCNC: 3.6 G/DL (ref 3.5–5)
ALP SERPL-CCNC: 83 U/L (ref 46–116)
ALT SERPL W P-5'-P-CCNC: 28 U/L (ref 12–78)
ANION GAP SERPL CALCULATED.3IONS-SCNC: 11 MMOL/L (ref 4–13)
APTT PPP: 26 SECONDS (ref 23–37)
AST SERPL W P-5'-P-CCNC: 14 U/L (ref 5–45)
BASOPHILS # BLD AUTO: 0.03 THOUSANDS/ΜL (ref 0–0.1)
BASOPHILS NFR BLD AUTO: 0 % (ref 0–1)
BILIRUB SERPL-MCNC: 0.3 MG/DL (ref 0.2–1)
BUN SERPL-MCNC: 17 MG/DL (ref 5–25)
CALCIUM SERPL-MCNC: 8.2 MG/DL (ref 8.3–10.1)
CHLORIDE SERPL-SCNC: 97 MMOL/L (ref 100–108)
CO2 SERPL-SCNC: 26 MMOL/L (ref 21–32)
CREAT SERPL-MCNC: 1.11 MG/DL (ref 0.6–1.3)
DIGOXIN SERPL-MCNC: 1.5 NG/ML (ref 0.8–2)
EOSINOPHIL # BLD AUTO: 0.07 THOUSAND/ΜL (ref 0–0.61)
EOSINOPHIL NFR BLD AUTO: 1 % (ref 0–6)
ERYTHROCYTE [DISTWIDTH] IN BLOOD BY AUTOMATED COUNT: 12.5 % (ref 11.6–15.1)
GFR SERPL CREATININE-BSD FRML MDRD: 72 ML/MIN/1.73SQ M
GLUCOSE SERPL-MCNC: 294 MG/DL (ref 65–140)
HCT VFR BLD AUTO: 42 % (ref 36.5–49.3)
HGB BLD-MCNC: 14.5 G/DL (ref 12–17)
IMM GRANULOCYTES # BLD AUTO: 0.13 THOUSAND/UL (ref 0–0.2)
IMM GRANULOCYTES NFR BLD AUTO: 1 % (ref 0–2)
INR PPP: 1 (ref 0.91–1.09)
LYMPHOCYTES # BLD AUTO: 2.86 THOUSANDS/ΜL (ref 0.6–4.47)
LYMPHOCYTES NFR BLD AUTO: 28 % (ref 14–44)
MCH RBC QN AUTO: 31.7 PG (ref 26.8–34.3)
MCHC RBC AUTO-ENTMCNC: 34.5 G/DL (ref 31.4–37.4)
MCV RBC AUTO: 92 FL (ref 82–98)
MONOCYTES # BLD AUTO: 0.66 THOUSAND/ΜL (ref 0.17–1.22)
MONOCYTES NFR BLD AUTO: 7 % (ref 4–12)
NEUTROPHILS # BLD AUTO: 6.35 THOUSANDS/ΜL (ref 1.85–7.62)
NEUTS SEG NFR BLD AUTO: 63 % (ref 43–75)
NRBC BLD AUTO-RTO: 0 /100 WBCS
PLATELET # BLD AUTO: 142 THOUSANDS/UL (ref 149–390)
PMV BLD AUTO: 10 FL (ref 8.9–12.7)
POTASSIUM SERPL-SCNC: 4.4 MMOL/L (ref 3.5–5.3)
PROT SERPL-MCNC: 6.8 G/DL (ref 6.4–8.2)
PROTHROMBIN TIME: 10.8 SECONDS (ref 9.8–12)
RBC # BLD AUTO: 4.58 MILLION/UL (ref 3.88–5.62)
SODIUM SERPL-SCNC: 134 MMOL/L (ref 136–145)
TROPONIN I SERPL-MCNC: <0.02 NG/ML
TROPONIN I SERPL-MCNC: <0.02 NG/ML
WBC # BLD AUTO: 10.1 THOUSAND/UL (ref 4.31–10.16)

## 2019-12-30 PROCEDURE — 80053 COMPREHEN METABOLIC PANEL: CPT | Performed by: EMERGENCY MEDICINE

## 2019-12-30 PROCEDURE — 85730 THROMBOPLASTIN TIME PARTIAL: CPT | Performed by: EMERGENCY MEDICINE

## 2019-12-30 PROCEDURE — 99285 EMERGENCY DEPT VISIT HI MDM: CPT

## 2019-12-30 PROCEDURE — 93005 ELECTROCARDIOGRAM TRACING: CPT

## 2019-12-30 PROCEDURE — 36415 COLL VENOUS BLD VENIPUNCTURE: CPT | Performed by: EMERGENCY MEDICINE

## 2019-12-30 PROCEDURE — 71045 X-RAY EXAM CHEST 1 VIEW: CPT

## 2019-12-30 PROCEDURE — 84484 ASSAY OF TROPONIN QUANT: CPT | Performed by: EMERGENCY MEDICINE

## 2019-12-30 PROCEDURE — 80162 ASSAY OF DIGOXIN TOTAL: CPT | Performed by: EMERGENCY MEDICINE

## 2019-12-30 PROCEDURE — 85025 COMPLETE CBC W/AUTO DIFF WBC: CPT | Performed by: EMERGENCY MEDICINE

## 2019-12-30 PROCEDURE — 85610 PROTHROMBIN TIME: CPT | Performed by: EMERGENCY MEDICINE

## 2019-12-30 RX ORDER — NITROGLYCERIN 0.4 MG/1
0.4 TABLET SUBLINGUAL ONCE
Status: COMPLETED | OUTPATIENT
Start: 2019-12-30 | End: 2019-12-30

## 2019-12-30 RX ADMIN — NITROGLYCERIN 0.4 MG: 0.4 TABLET SUBLINGUAL at 14:22

## 2019-12-30 NOTE — ED PROCEDURE NOTE
PROCEDURE  ECG 12 Lead Documentation Only  Date/Time: 12/30/2019 2:08 PM  Performed by: Efe Chavez DO  Authorized by: Efe Chavez DO     ECG reviewed by me, the ED Provider: yes    Patient location:  ED  Interpretation:     Interpretation: abnormal    Rate:     ECG rate:  86    ECG rate assessment: normal    Rhythm:     Rhythm: atrial fibrillation    ST segments:     ST segments:  Normal  T waves:     T waves: normal           Efe Chavez DO  12/30/19 1408

## 2019-12-30 NOTE — PROGRESS NOTES
Received a [place] text from [address], stating she was with the patient & he was c/o chest pressure, was fatigued, "very ashy and pale"  His Spo2 was 95% on O2 @ 2LPM via n/c,  with pulse oximeter  No cough, SOB, n/v, edema  CHW will call 911 for transport to the ED

## 2019-12-31 LAB
ATRIAL RATE: 70 BPM
QRS AXIS: 75 DEGREES
QRSD INTERVAL: 90 MS
QT INTERVAL: 348 MS
QTC INTERVAL: 416 MS
T WAVE AXIS: 36 DEGREES
VENTRICULAR RATE: 86 BPM

## 2019-12-31 PROCEDURE — 93010 ELECTROCARDIOGRAM REPORT: CPT | Performed by: INTERNAL MEDICINE

## 2020-01-02 ENCOUNTER — CONSULT (OUTPATIENT)
Dept: CARDIOLOGY CLINIC | Facility: CLINIC | Age: 60
End: 2020-01-02
Payer: MEDICARE

## 2020-01-02 VITALS
DIASTOLIC BLOOD PRESSURE: 84 MMHG | SYSTOLIC BLOOD PRESSURE: 144 MMHG | HEART RATE: 86 BPM | OXYGEN SATURATION: 98 % | HEIGHT: 70 IN | BODY MASS INDEX: 38.51 KG/M2 | WEIGHT: 269 LBS

## 2020-01-02 DIAGNOSIS — E78.5 HYPERLIPIDEMIA, UNSPECIFIED HYPERLIPIDEMIA TYPE: ICD-10-CM

## 2020-01-02 DIAGNOSIS — J44.9 OSA AND COPD OVERLAP SYNDROME (HCC): ICD-10-CM

## 2020-01-02 DIAGNOSIS — I25.10 CORONARY ARTERIOSCLEROSIS: ICD-10-CM

## 2020-01-02 DIAGNOSIS — I48.91 ATRIAL FIBRILLATION WITH RVR (HCC): Primary | ICD-10-CM

## 2020-01-02 DIAGNOSIS — N17.1: ICD-10-CM

## 2020-01-02 DIAGNOSIS — G47.33 OSA AND COPD OVERLAP SYNDROME (HCC): ICD-10-CM

## 2020-01-02 DIAGNOSIS — E11.65 UNCONTROLLED TYPE 2 DIABETES MELLITUS WITH HYPERGLYCEMIA (HCC): ICD-10-CM

## 2020-01-02 DIAGNOSIS — N18.2: ICD-10-CM

## 2020-01-02 PROCEDURE — 93000 ELECTROCARDIOGRAM COMPLETE: CPT | Performed by: INTERNAL MEDICINE

## 2020-01-02 PROCEDURE — 99214 OFFICE O/P EST MOD 30 MIN: CPT | Performed by: INTERNAL MEDICINE

## 2020-01-02 RX ORDER — ATORVASTATIN CALCIUM 40 MG/1
40 TABLET, FILM COATED ORAL DAILY
Qty: 90 TABLET | Refills: 3 | Status: SHIPPED | OUTPATIENT
Start: 2020-01-02 | End: 2020-08-04 | Stop reason: SDUPTHER

## 2020-01-02 NOTE — PROGRESS NOTES
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 electronically signed by    Millicent Flowers MD  Signed 08-Aug-2019 12:05:31       No results found for this or any previous visit  No results found for this or any previous visit  No results found for this or any previous visit  No results found for this or any previous visit  No results found for this or any previous visit  EKG: Personally reviewed  Atrial fibrillation at 85 beats per min      Ksenia Pulliam DO, [place]  [phone]  Please call with any questions
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
## 2020-01-03 ENCOUNTER — TELEPHONE (OUTPATIENT)
Dept: ENDOCRINOLOGY | Facility: CLINIC | Age: 60
End: 2020-01-03

## 2020-01-03 DIAGNOSIS — E11.65 UNCONTROLLED TYPE 2 DIABETES MELLITUS WITH HYPERGLYCEMIA (HCC): ICD-10-CM

## 2020-01-03 DIAGNOSIS — E11.42 DIABETIC POLYNEUROPATHY ASSOCIATED WITH TYPE 2 DIABETES MELLITUS (HCC): ICD-10-CM

## 2020-01-03 DIAGNOSIS — E11.8 TYPE 2 DIABETES MELLITUS WITH COMPLICATION, WITH LONG-TERM CURRENT USE OF INSULIN (HCC): ICD-10-CM

## 2020-01-03 DIAGNOSIS — Z79.4 TYPE 2 DIABETES MELLITUS WITH COMPLICATION, WITH LONG-TERM CURRENT USE OF INSULIN (HCC): ICD-10-CM

## 2020-01-03 NOTE — TELEPHONE ENCOUNTER
----- Message from Que Lugo MD sent at 1/2/2020 10:15 AM EST -----  Please remind patient to have labs done as ordered   ACTH, cortisol level to be checked in the morning as previously dicussed on the phone

## 2020-01-03 NOTE — PROGRESS NOTES
Went for scheduled home visit  patient stated he ws feeling ill  CHW noticed patient looked pale and ashen color  CHW asked patient what he was feeling, "patient stated chest pressure, fatigued and weak"  CHW Cem texted  Liza Landry, advising patient  was c/o chest pressure, was fatigued, very ashy and pale  His Spo2 was 95% on O2 @ 2LPM via n/c,  with pulse oximeter  No cough, SOB, nausea or vomiting, edema  CHW also contacted supervisor Daina Has to advise situation and advise I couldn't reach  if she thought I should call 911, Daina Has responded back that I needed to call 911 per herself and Elisabeth Padgett   CHW called 911 and stayed with patient until he was transported to [facility] ER   CHW will continue to [facility]

## 2020-01-03 NOTE — TELEPHONE ENCOUNTER
Spoke to patient, kelley Mantilla report reviewed  1  Increase Basaglar to 55 units at bedtime  2  Continue Novolog 20 units with meals  3  Continue sliding scale insulin  4   Send report in 1-2 weeks for review

## 2020-01-06 ENCOUNTER — PATIENT OUTREACH (OUTPATIENT)
Dept: CASE MANAGEMENT | Facility: OTHER | Age: 60
End: 2020-01-06

## 2020-01-06 ENCOUNTER — APPOINTMENT (OUTPATIENT)
Dept: LAB | Facility: HOSPITAL | Age: 60
End: 2020-01-06
Attending: INTERNAL MEDICINE
Payer: MEDICARE

## 2020-01-06 DIAGNOSIS — E11.42 DIABETIC POLYNEUROPATHY ASSOCIATED WITH TYPE 2 DIABETES MELLITUS (HCC): ICD-10-CM

## 2020-01-06 DIAGNOSIS — R79.89 ABNORMAL CORTISOL LEVEL: ICD-10-CM

## 2020-01-06 DIAGNOSIS — E78.2 MIXED HYPERLIPIDEMIA: ICD-10-CM

## 2020-01-06 LAB
ANION GAP SERPL CALCULATED.3IONS-SCNC: 9 MMOL/L (ref 4–13)
BUN SERPL-MCNC: 13 MG/DL (ref 5–25)
CALCIUM SERPL-MCNC: 9.2 MG/DL (ref 8.3–10.1)
CHLORIDE SERPL-SCNC: 97 MMOL/L (ref 100–108)
CHOLEST SERPL-MCNC: 170 MG/DL (ref 50–200)
CO2 SERPL-SCNC: 29 MMOL/L (ref 21–32)
CORTIS AM PEAK SERPL-MCNC: 8 UG/DL (ref 4.2–22.4)
CREAT SERPL-MCNC: 0.91 MG/DL (ref 0.6–1.3)
CREAT UR-MCNC: 97.9 MG/DL
EST. AVERAGE GLUCOSE BLD GHB EST-MCNC: 192 MG/DL
GFR SERPL CREATININE-BSD FRML MDRD: 91 ML/MIN/1.73SQ M
GLUCOSE P FAST SERPL-MCNC: 235 MG/DL (ref 65–99)
HBA1C MFR BLD: 8.3 % (ref 4.2–6.3)
HDLC SERPL-MCNC: 42 MG/DL
LDLC SERPL CALC-MCNC: 69 MG/DL (ref 0–100)
MICROALBUMIN UR-MCNC: 19.2 MG/L (ref 0–20)
MICROALBUMIN/CREAT 24H UR: 20 MG/G CREATININE (ref 0–30)
NONHDLC SERPL-MCNC: 128 MG/DL
POTASSIUM SERPL-SCNC: 4.2 MMOL/L (ref 3.5–5.3)
SODIUM SERPL-SCNC: 135 MMOL/L (ref 136–145)
TRIGL SERPL-MCNC: 295 MG/DL

## 2020-01-06 PROCEDURE — 82570 ASSAY OF URINE CREATININE: CPT

## 2020-01-06 PROCEDURE — 82533 TOTAL CORTISOL: CPT

## 2020-01-06 PROCEDURE — 82024 ASSAY OF ACTH: CPT

## 2020-01-06 PROCEDURE — 82043 UR ALBUMIN QUANTITATIVE: CPT

## 2020-01-06 PROCEDURE — 83036 HEMOGLOBIN GLYCOSYLATED A1C: CPT | Performed by: INTERNAL MEDICINE

## 2020-01-06 PROCEDURE — 80061 LIPID PANEL: CPT

## 2020-01-06 PROCEDURE — 80048 BASIC METABOLIC PNL TOTAL CA: CPT

## 2020-01-06 PROCEDURE — 36415 COLL VENOUS BLD VENIPUNCTURE: CPT | Performed by: INTERNAL MEDICINE

## 2020-01-06 NOTE — PROGRESS NOTES
Went for scheduled home visit  Patient well, tired from recent move into new [facility] apartment in [place]  Patient has all medications and is taking properly  Using oxygen and nebulizer as prescribed  Has been compliant with appointments and has future appointments scheduled along with transportation  Patient ordered groceries through [facility] delivery  Asked if we could get listing of food suero since he has to pay both rent for January and February and he will be low to pay   For food  CHW will get listing of food suero and assist in contacting for  dates and times  CHW will also assist to follow up with Irma Flowers,  from [address] [place] [address] Rd to get status of program  No further needs, CHW will continue to assist as needed

## 2020-01-06 NOTE — PROGRESS NOTES
Patient states he feels much better  He denies palpitations or chest pressure  No c/o lightheadedness, dizziness, SOB, edema, n/v, diarrhea, fever, aches, or chills  He is taking Basaglar 55 units at bedtime, Novolog 20 units with meals, & sliding scale coverage as ordered  He reports this weekend he had a blood sugar high of 280+ related to a dietary indiscretion and a blood sugar low of 94  He is officially moved into his new apartment & will no longer have to deal with stairs  He is in the process of getting his address changed  Emphasized to change with his [employer] D[facility] & mail order pharmacy ASAP  He will f/u with his ortho doctor for cortisone shots to his shoulder  Reviewed his upcoming [facility] appointments through February  Will continue to check in periodically, but patient will call for questions or assistance

## 2020-01-07 LAB — ACTH PLAS-MCNC: 7.3 PG/ML (ref 7.2–63.3)

## 2020-01-10 ENCOUNTER — DOCUMENTATION (OUTPATIENT)
Dept: ENDOCRINOLOGY | Facility: CLINIC | Age: 60
End: 2020-01-10

## 2020-01-10 ENCOUNTER — TELEPHONE (OUTPATIENT)
Dept: ENDOCRINOLOGY | Facility: CLINIC | Age: 60
End: 2020-01-10

## 2020-01-10 RX ORDER — COSYNTROPIN 0.25 MG/ML
0.25 INJECTION, POWDER, FOR SOLUTION INTRAMUSCULAR; INTRAVENOUS ONCE
Status: CANCELLED | OUTPATIENT
Start: 2020-01-10

## 2020-01-10 NOTE — TELEPHONE ENCOUNTER
Called and spoke with Edwina Burns to discuss recent lab     Repeat AM cortisol 8, ACTH 7 3  Serum Sodium level low  Will plan for ACTH stimulation test-ordered in therapy plan

## 2020-01-13 ENCOUNTER — HOSPITAL ENCOUNTER (OUTPATIENT)
Dept: RADIOLOGY | Facility: HOSPITAL | Age: 60
Discharge: HOME | End: 2020-01-13
Attending: INTERNAL MEDICINE
Payer: MEDICARE

## 2020-01-13 ENCOUNTER — HOSPITAL ENCOUNTER (OUTPATIENT)
Dept: NON INVASIVE DIAGNOSTICS | Facility: HOSPITAL | Age: 60
Discharge: HOME | End: 2020-01-13
Attending: INTERNAL MEDICINE
Payer: MEDICARE

## 2020-01-13 DIAGNOSIS — I25.10 CORONARY ARTERIOSCLEROSIS: ICD-10-CM

## 2020-01-13 LAB
CHEST PAIN STATEMENT: NORMAL
MAX DIASTOLIC BP: 79 MMHG
MAX HEART RATE: 88 BPM
MAX PREDICTED HEART RATE: 160 BPM
MAX. SYSTOLIC BP: 164 MMHG
PROTOCOL NAME: NORMAL
REASON FOR TERMINATION: NORMAL
TARGET HR FORMULA: NORMAL
TEST INDICATION: NORMAL
TIME IN EXERCISE PHASE: NORMAL

## 2020-01-13 PROCEDURE — 93016 CV STRESS TEST SUPVJ ONLY: CPT | Performed by: INTERNAL MEDICINE

## 2020-01-13 PROCEDURE — A9502 TC99M TETROFOSMIN: HCPCS

## 2020-01-13 PROCEDURE — 93018 CV STRESS TEST I&R ONLY: CPT | Performed by: INTERNAL MEDICINE

## 2020-01-13 PROCEDURE — 78452 HT MUSCLE IMAGE SPECT MULT: CPT

## 2020-01-13 PROCEDURE — 78452 HT MUSCLE IMAGE SPECT MULT: CPT | Performed by: INTERNAL MEDICINE

## 2020-01-13 PROCEDURE — 93017 CV STRESS TEST TRACING ONLY: CPT

## 2020-01-13 RX ADMIN — REGADENOSON 0.4 MG: 0.08 INJECTION, SOLUTION INTRAVENOUS at 10:45

## 2020-01-14 ENCOUNTER — TELEPHONE (OUTPATIENT)
Dept: CARDIOLOGY CLINIC | Facility: CLINIC | Age: 60
End: 2020-01-14

## 2020-01-14 NOTE — TELEPHONE ENCOUNTER
----- Message from Ana Paula Solares DO sent at 1/14/2020  2:21 PM EST -----  Can you please let the patient know stress test was normal

## 2020-01-20 ENCOUNTER — PATIENT OUTREACH (OUTPATIENT)
Dept: CASE MANAGEMENT | Facility: OTHER | Age: 60
End: 2020-01-20

## 2020-01-20 DIAGNOSIS — Z79.4 TYPE 2 DIABETES MELLITUS WITH COMPLICATION, WITH LONG-TERM CURRENT USE OF INSULIN (HCC): ICD-10-CM

## 2020-01-20 DIAGNOSIS — E11.8 TYPE 2 DIABETES MELLITUS WITH COMPLICATION, WITH LONG-TERM CURRENT USE OF INSULIN (HCC): ICD-10-CM

## 2020-01-20 RX ORDER — LIRAGLUTIDE 6 MG/ML
INJECTION SUBCUTANEOUS
Qty: 9 ML | Refills: 0 | Status: SHIPPED | OUTPATIENT
Start: 2020-01-20 | End: 2020-02-02 | Stop reason: ALTCHOICE

## 2020-01-23 ENCOUNTER — TELEPHONE (OUTPATIENT)
Dept: ENDOCRINOLOGY | Facility: CLINIC | Age: 60
End: 2020-01-23

## 2020-01-24 ENCOUNTER — TELEPHONE (OUTPATIENT)
Dept: ENDOCRINOLOGY | Facility: CLINIC | Age: 60
End: 2020-01-24

## 2020-01-24 NOTE — TELEPHONE ENCOUNTER
Patient called looking for information on a test that was to be ordered at the infusion center  I don't see anything ordered or mentioned in your notes

## 2020-01-24 NOTE — PROGRESS NOTES
Went for home visit, patient doing well  CHW provided with food bank listing  Discussed CHW picking up food at food bank for patient prior to next visit  Patient has been compliant with appointments and has future appointments scheduled along with transportation  Patient has all medication and is taking properly  Using oxygen daily and nebulizer as prescribed  CHW discussed contacting Monserrat  with [facility] at next visit to follow up on status of [facility] program  No further needs at this time, CHW will continue to follow as needed

## 2020-01-27 ENCOUNTER — TELEPHONE (OUTPATIENT)
Dept: PULMONOLOGY | Facility: MEDICAL CENTER | Age: 60
End: 2020-01-27

## 2020-01-27 ENCOUNTER — PATIENT OUTREACH (OUTPATIENT)
Dept: CASE MANAGEMENT | Facility: OTHER | Age: 60
End: 2020-01-27

## 2020-01-27 DIAGNOSIS — J44.1 COPD EXACERBATION (HCC): Primary | ICD-10-CM

## 2020-01-27 RX ORDER — PREDNISONE 10 MG/1
TABLET ORAL
Qty: 18 TABLET | Refills: 0 | Status: SHIPPED | OUTPATIENT
Start: 2020-01-27 | End: 2020-02-11 | Stop reason: HOSPADM

## 2020-01-27 NOTE — TELEPHONE ENCOUNTER
Called the infusion center and was told the doctor has to put the order in [place]  Explained to the technician that it is already in  She will call me back after she tries to find it

## 2020-01-27 NOTE — TELEPHONE ENCOUNTER
Patient calling to ask which medication he should be using for congestion  Can you please call with medical advise

## 2020-01-27 NOTE — PROGRESS NOTES
Patient has wheezing today  He has a nebulizer with levalbuterol  He has not used the nebulizer today  He is using trelegy as well  He is tight in the chest   He would like something to help with wheezing  I will order a short course of prednisone  I will order as follows:  10 mg tabs will be ordered  3 tabs daily x 3 days, 2 tabs daily x 3 days 1 tab daily x 3 day

## 2020-01-28 ENCOUNTER — TELEPHONE (OUTPATIENT)
Dept: ENDOCRINOLOGY | Facility: CLINIC | Age: 60
End: 2020-01-28

## 2020-01-28 NOTE — TELEPHONE ENCOUNTER
Called and spoke with Mr James Segovia  Discussed that prednisone can cause steroid induced hyperglycemia  This morning blood sugars 209 mg/dL   For now will continue his current regimen and sliding scale insulin as needed  If blood sugars trend up higher, advised to use a higher sliding scale 3:50> 150    he may also need to take more mealtime insulin  ACTH stimulation test should be done once he is off the prednisone  Patient verbalized understanding

## 2020-01-28 NOTE — TELEPHONE ENCOUNTER
Patient called this morning to let you know that he was put on prednisone yesterday  Didn't know if that affects his diabetes

## 2020-01-29 NOTE — PROGRESS NOTES
Went to [facility] [employer] and waited to  groceries for patient  Took groceries to patient  He is doing well, using oxygen and nebulizer ad cpap for bedtime  Has all medication and is getting pill packs through [facility] care  Has all appointments scheduled and up to date  Sets up transportation through [facility]  Waiting on [facility]  to contact and let him know when he will receive home delivered meals, lifeline and a HHA  If no contact with case manger by our next visit, CHW and patient will reach out to f/u  No further needs at this time  CHW will continue to assist as needed

## 2020-02-02 ENCOUNTER — HOSPITAL ENCOUNTER (INPATIENT)
Facility: HOSPITAL | Age: 60
LOS: 9 days | Discharge: HOME HEALTH | DRG: 871 | End: 2020-02-11
Attending: EMERGENCY MEDICINE | Admitting: FAMILY MEDICINE
Payer: MEDICARE

## 2020-02-02 ENCOUNTER — APPOINTMENT (EMERGENCY)
Dept: RADIOLOGY | Facility: HOSPITAL | Age: 60
DRG: 871 | End: 2020-02-02
Payer: MEDICARE

## 2020-02-02 ENCOUNTER — APPOINTMENT (EMERGENCY)
Dept: RADIOLOGY | Facility: HOSPITAL | Age: 60
DRG: 871 | End: 2020-02-02
Attending: EMERGENCY MEDICINE
Payer: MEDICARE

## 2020-02-02 DIAGNOSIS — J96.21 ACUTE ON CHRONIC RESPIRATORY FAILURE WITH HYPOXIA (HCC): ICD-10-CM

## 2020-02-02 DIAGNOSIS — R04.2 HEMOPTYSIS: ICD-10-CM

## 2020-02-02 DIAGNOSIS — E87.1 HYPONATREMIA: ICD-10-CM

## 2020-02-02 DIAGNOSIS — J44.9 COPD (CHRONIC OBSTRUCTIVE PULMONARY DISEASE) (HCC): ICD-10-CM

## 2020-02-02 DIAGNOSIS — J18.9 PNEUMONIA: Primary | ICD-10-CM

## 2020-02-02 DIAGNOSIS — L02.412 ABSCESS OF LEFT AXILLA: ICD-10-CM

## 2020-02-02 DIAGNOSIS — J18.9 COMMUNITY ACQUIRED PNEUMONIA: ICD-10-CM

## 2020-02-02 DIAGNOSIS — R06.02 SHORTNESS OF BREATH: ICD-10-CM

## 2020-02-02 PROBLEM — N18.2 STAGE 2 CHRONIC KIDNEY DISEASE: Status: ACTIVE | Noted: 2020-02-02

## 2020-02-02 PROBLEM — G47.33 OSA TREATED WITH BIPAP: Status: ACTIVE | Noted: 2020-02-02

## 2020-02-02 PROBLEM — I50.22 CHRONIC SYSTOLIC CONGESTIVE HEART FAILURE (HCC): Status: RESOLVED | Noted: 2020-02-02 | Resolved: 2020-02-02

## 2020-02-02 PROBLEM — A41.9 SEVERE SEPSIS (HCC): Status: ACTIVE | Noted: 2020-02-02

## 2020-02-02 PROBLEM — I48.20 CHRONIC A-FIB (HCC): Status: ACTIVE | Noted: 2020-02-02

## 2020-02-02 PROBLEM — I50.22 CHRONIC SYSTOLIC CONGESTIVE HEART FAILURE (HCC): Status: ACTIVE | Noted: 2020-02-02

## 2020-02-02 PROBLEM — J44.1 COPD EXACERBATION (HCC): Status: ACTIVE | Noted: 2020-02-02

## 2020-02-02 PROBLEM — E66.9 OBESITY (BMI 30-39.9): Status: ACTIVE | Noted: 2020-02-02

## 2020-02-02 PROBLEM — R65.20 SEVERE SEPSIS (HCC): Status: ACTIVE | Noted: 2020-02-02

## 2020-02-02 PROBLEM — E10.22 CKD STAGE 2 DUE TO TYPE 1 DIABETES MELLITUS (HCC): Status: ACTIVE | Noted: 2020-02-02

## 2020-02-02 PROBLEM — N18.2 CKD STAGE 2 DUE TO TYPE 1 DIABETES MELLITUS (HCC): Status: ACTIVE | Noted: 2020-02-02

## 2020-02-02 LAB
ALBUMIN SERPL BCP-MCNC: 2.7 G/DL (ref 3.5–5)
ALP SERPL-CCNC: 70 U/L (ref 46–116)
ALT SERPL W P-5'-P-CCNC: 23 U/L (ref 12–78)
ANION GAP SERPL CALCULATED.3IONS-SCNC: 13 MMOL/L (ref 4–13)
APTT PPP: 34 SECONDS (ref 23–37)
AST SERPL W P-5'-P-CCNC: 20 U/L (ref 5–45)
BASOPHILS # BLD AUTO: 0.02 THOUSANDS/ΜL (ref 0–0.1)
BASOPHILS NFR BLD AUTO: 0 % (ref 0–1)
BILIRUB SERPL-MCNC: 0.9 MG/DL (ref 0.2–1)
BUN SERPL-MCNC: 24 MG/DL (ref 5–25)
CALCIUM SERPL-MCNC: 9.4 MG/DL (ref 8.3–10.1)
CHLORIDE SERPL-SCNC: 92 MMOL/L (ref 100–108)
CO2 SERPL-SCNC: 25 MMOL/L (ref 21–32)
CREAT SERPL-MCNC: 1.25 MG/DL (ref 0.6–1.3)
EOSINOPHIL # BLD AUTO: 0 THOUSAND/ΜL (ref 0–0.61)
EOSINOPHIL NFR BLD AUTO: 0 % (ref 0–6)
ERYTHROCYTE [DISTWIDTH] IN BLOOD BY AUTOMATED COUNT: 12.9 % (ref 11.6–15.1)
FLUAV RNA NPH QL NAA+PROBE: NORMAL
FLUBV RNA NPH QL NAA+PROBE: NORMAL
GFR SERPL CREATININE-BSD FRML MDRD: 62 ML/MIN/1.73SQ M
GLUCOSE SERPL-MCNC: 370 MG/DL (ref 65–140)
HCT VFR BLD AUTO: 37.6 % (ref 36.5–49.3)
HGB BLD-MCNC: 12.9 G/DL (ref 12–17)
IMM GRANULOCYTES # BLD AUTO: 0.11 THOUSAND/UL (ref 0–0.2)
IMM GRANULOCYTES NFR BLD AUTO: 1 % (ref 0–2)
INR PPP: 1.08 (ref 0.91–1.09)
LACTATE SERPL-SCNC: 1.3 MMOL/L (ref 0.5–2)
LACTATE SERPL-SCNC: 2.7 MMOL/L (ref 0.5–2)
LIPASE SERPL-CCNC: 90 U/L (ref 73–393)
LYMPHOCYTES # BLD AUTO: 2.64 THOUSANDS/ΜL (ref 0.6–4.47)
LYMPHOCYTES NFR BLD AUTO: 15 % (ref 14–44)
MAGNESIUM SERPL-MCNC: 1.9 MG/DL (ref 1.6–2.6)
MCH RBC QN AUTO: 31.5 PG (ref 26.8–34.3)
MCHC RBC AUTO-ENTMCNC: 34.3 G/DL (ref 31.4–37.4)
MCV RBC AUTO: 92 FL (ref 82–98)
MONOCYTES # BLD AUTO: 0.94 THOUSAND/ΜL (ref 0.17–1.22)
MONOCYTES NFR BLD AUTO: 5 % (ref 4–12)
NEUTROPHILS # BLD AUTO: 13.65 THOUSANDS/ΜL (ref 1.85–7.62)
NEUTS SEG NFR BLD AUTO: 79 % (ref 43–75)
NRBC BLD AUTO-RTO: 0 /100 WBCS
NT-PROBNP SERPL-MCNC: 820 PG/ML
PLATELET # BLD AUTO: 152 THOUSANDS/UL (ref 149–390)
PMV BLD AUTO: 9.8 FL (ref 8.9–12.7)
POTASSIUM SERPL-SCNC: 4 MMOL/L (ref 3.5–5.3)
PROT SERPL-MCNC: 7.5 G/DL (ref 6.4–8.2)
PROTHROMBIN TIME: 11.6 SECONDS (ref 9.8–12)
RBC # BLD AUTO: 4.09 MILLION/UL (ref 3.88–5.62)
RSV RNA NPH QL NAA+PROBE: NORMAL
SODIUM SERPL-SCNC: 130 MMOL/L (ref 136–145)
TROPONIN I SERPL-MCNC: <0.02 NG/ML
WBC # BLD AUTO: 17.36 THOUSAND/UL (ref 4.31–10.16)

## 2020-02-02 PROCEDURE — 84484 ASSAY OF TROPONIN QUANT: CPT | Performed by: EMERGENCY MEDICINE

## 2020-02-02 PROCEDURE — 96365 THER/PROPH/DIAG IV INF INIT: CPT

## 2020-02-02 PROCEDURE — 87040 BLOOD CULTURE FOR BACTERIA: CPT | Performed by: EMERGENCY MEDICINE

## 2020-02-02 PROCEDURE — 99285 EMERGENCY DEPT VISIT HI MDM: CPT | Performed by: EMERGENCY MEDICINE

## 2020-02-02 PROCEDURE — 80053 COMPREHEN METABOLIC PANEL: CPT | Performed by: EMERGENCY MEDICINE

## 2020-02-02 PROCEDURE — 85025 COMPLETE CBC W/AUTO DIFF WBC: CPT | Performed by: EMERGENCY MEDICINE

## 2020-02-02 PROCEDURE — 96374 THER/PROPH/DIAG INJ IV PUSH: CPT

## 2020-02-02 PROCEDURE — 99223 1ST HOSP IP/OBS HIGH 75: CPT | Performed by: NURSE PRACTITIONER

## 2020-02-02 PROCEDURE — 83880 ASSAY OF NATRIURETIC PEPTIDE: CPT | Performed by: EMERGENCY MEDICINE

## 2020-02-02 PROCEDURE — 71260 CT THORAX DX C+: CPT

## 2020-02-02 PROCEDURE — 83690 ASSAY OF LIPASE: CPT | Performed by: EMERGENCY MEDICINE

## 2020-02-02 PROCEDURE — 99285 EMERGENCY DEPT VISIT HI MDM: CPT

## 2020-02-02 PROCEDURE — 74177 CT ABD & PELVIS W/CONTRAST: CPT

## 2020-02-02 PROCEDURE — 87631 RESP VIRUS 3-5 TARGETS: CPT | Performed by: EMERGENCY MEDICINE

## 2020-02-02 PROCEDURE — 84145 PROCALCITONIN (PCT): CPT | Performed by: EMERGENCY MEDICINE

## 2020-02-02 PROCEDURE — 94640 AIRWAY INHALATION TREATMENT: CPT

## 2020-02-02 PROCEDURE — 96367 TX/PROPH/DG ADDL SEQ IV INF: CPT

## 2020-02-02 PROCEDURE — 85610 PROTHROMBIN TIME: CPT | Performed by: EMERGENCY MEDICINE

## 2020-02-02 PROCEDURE — 5A09357 ASSISTANCE WITH RESPIRATORY VENTILATION, LESS THAN 24 CONSECUTIVE HOURS, CONTINUOUS POSITIVE AIRWAY PRESSURE: ICD-10-PCS | Performed by: INTERNAL MEDICINE

## 2020-02-02 PROCEDURE — 36415 COLL VENOUS BLD VENIPUNCTURE: CPT | Performed by: EMERGENCY MEDICINE

## 2020-02-02 PROCEDURE — 71045 X-RAY EXAM CHEST 1 VIEW: CPT

## 2020-02-02 PROCEDURE — 93005 ELECTROCARDIOGRAM TRACING: CPT

## 2020-02-02 PROCEDURE — 83735 ASSAY OF MAGNESIUM: CPT | Performed by: EMERGENCY MEDICINE

## 2020-02-02 PROCEDURE — 96361 HYDRATE IV INFUSION ADD-ON: CPT

## 2020-02-02 PROCEDURE — 85730 THROMBOPLASTIN TIME PARTIAL: CPT | Performed by: EMERGENCY MEDICINE

## 2020-02-02 PROCEDURE — 87147 CULTURE TYPE IMMUNOLOGIC: CPT | Performed by: EMERGENCY MEDICINE

## 2020-02-02 PROCEDURE — 83605 ASSAY OF LACTIC ACID: CPT | Performed by: EMERGENCY MEDICINE

## 2020-02-02 RX ORDER — DIGOXIN 125 MCG
250 TABLET ORAL DAILY
Status: DISCONTINUED | OUTPATIENT
Start: 2020-02-03 | End: 2020-02-11 | Stop reason: HOSPADM

## 2020-02-02 RX ORDER — PREDNISONE 10 MG/1
10 TABLET ORAL DAILY
Status: DISCONTINUED | OUTPATIENT
Start: 2020-02-03 | End: 2020-02-03

## 2020-02-02 RX ORDER — PANTOPRAZOLE SODIUM 40 MG/1
40 TABLET, DELAYED RELEASE ORAL
Status: DISCONTINUED | OUTPATIENT
Start: 2020-02-02 | End: 2020-02-11 | Stop reason: HOSPADM

## 2020-02-02 RX ORDER — KETOROLAC TROMETHAMINE 30 MG/ML
15 INJECTION, SOLUTION INTRAMUSCULAR; INTRAVENOUS ONCE
Status: COMPLETED | OUTPATIENT
Start: 2020-02-02 | End: 2020-02-02

## 2020-02-02 RX ORDER — SODIUM CHLORIDE 9 MG/ML
3 INJECTION INTRAVENOUS AS NEEDED
Status: DISCONTINUED | OUTPATIENT
Start: 2020-02-02 | End: 2020-02-11 | Stop reason: HOSPADM

## 2020-02-02 RX ORDER — FLUTICASONE FUROATE AND VILANTEROL 100; 25 UG/1; UG/1
1 POWDER RESPIRATORY (INHALATION) DAILY
Status: DISCONTINUED | OUTPATIENT
Start: 2020-02-03 | End: 2020-02-11 | Stop reason: HOSPADM

## 2020-02-02 RX ORDER — CEFTRIAXONE 2 G/50ML
2000 INJECTION, SOLUTION INTRAVENOUS EVERY 24 HOURS
Status: DISCONTINUED | OUTPATIENT
Start: 2020-02-03 | End: 2020-02-11

## 2020-02-02 RX ORDER — GUAIFENESIN 600 MG
600 TABLET, EXTENDED RELEASE 12 HR ORAL 2 TIMES DAILY
Status: DISCONTINUED | OUTPATIENT
Start: 2020-02-03 | End: 2020-02-02

## 2020-02-02 RX ORDER — ATORVASTATIN CALCIUM 40 MG/1
40 TABLET, FILM COATED ORAL
Status: DISCONTINUED | OUTPATIENT
Start: 2020-02-02 | End: 2020-02-11 | Stop reason: HOSPADM

## 2020-02-02 RX ORDER — ACETAMINOPHEN 325 MG/1
975 TABLET ORAL ONCE
Status: COMPLETED | OUTPATIENT
Start: 2020-02-02 | End: 2020-02-02

## 2020-02-02 RX ORDER — MAGNESIUM SULFATE 1 G/100ML
1 INJECTION INTRAVENOUS ONCE
Status: COMPLETED | OUTPATIENT
Start: 2020-02-03 | End: 2020-02-03

## 2020-02-02 RX ORDER — METOPROLOL SUCCINATE 100 MG/1
200 TABLET, EXTENDED RELEASE ORAL DAILY
Status: DISCONTINUED | OUTPATIENT
Start: 2020-02-03 | End: 2020-02-11 | Stop reason: HOSPADM

## 2020-02-02 RX ORDER — SACCHAROMYCES BOULARDII 250 MG
250 CAPSULE ORAL 2 TIMES DAILY
Status: DISCONTINUED | OUTPATIENT
Start: 2020-02-03 | End: 2020-02-11 | Stop reason: HOSPADM

## 2020-02-02 RX ORDER — LEVALBUTEROL 1.25 MG/.5ML
1.25 SOLUTION, CONCENTRATE RESPIRATORY (INHALATION)
Status: DISCONTINUED | OUTPATIENT
Start: 2020-02-03 | End: 2020-02-11 | Stop reason: HOSPADM

## 2020-02-02 RX ORDER — QUETIAPINE 200 MG/1
400 TABLET, FILM COATED, EXTENDED RELEASE ORAL
Status: DISCONTINUED | OUTPATIENT
Start: 2020-02-02 | End: 2020-02-03

## 2020-02-02 RX ORDER — IPRATROPIUM BROMIDE AND ALBUTEROL SULFATE 2.5; .5 MG/3ML; MG/3ML
3 SOLUTION RESPIRATORY (INHALATION) ONCE
Status: COMPLETED | OUTPATIENT
Start: 2020-02-02 | End: 2020-02-02

## 2020-02-02 RX ORDER — LEVALBUTEROL INHALATION SOLUTION 0.63 MG/3ML
0.63 SOLUTION RESPIRATORY (INHALATION) EVERY 4 HOURS PRN
Status: DISCONTINUED | OUTPATIENT
Start: 2020-02-02 | End: 2020-02-11 | Stop reason: HOSPADM

## 2020-02-02 RX ORDER — METOPROLOL SUCCINATE 100 MG/1
200 TABLET, EXTENDED RELEASE ORAL DAILY
Status: DISCONTINUED | OUTPATIENT
Start: 2020-02-03 | End: 2020-02-02

## 2020-02-02 RX ORDER — CEFTRIAXONE 1 G/50ML
1000 INJECTION, SOLUTION INTRAVENOUS ONCE
Status: COMPLETED | OUTPATIENT
Start: 2020-02-02 | End: 2020-02-02

## 2020-02-02 RX ORDER — PREGABALIN 50 MG/1
50 CAPSULE ORAL 3 TIMES DAILY
Status: DISCONTINUED | OUTPATIENT
Start: 2020-02-02 | End: 2020-02-11 | Stop reason: HOSPADM

## 2020-02-02 RX ORDER — ASPIRIN 81 MG/1
81 TABLET, CHEWABLE ORAL EVERY MORNING
Status: DISCONTINUED | OUTPATIENT
Start: 2020-02-03 | End: 2020-02-06

## 2020-02-02 RX ORDER — AZITHROMYCIN 250 MG/1
500 TABLET, FILM COATED ORAL EVERY 24 HOURS
Status: DISCONTINUED | OUTPATIENT
Start: 2020-02-03 | End: 2020-02-04

## 2020-02-02 RX ORDER — INSULIN GLARGINE 100 [IU]/ML
55 INJECTION, SOLUTION SUBCUTANEOUS
Status: DISCONTINUED | OUTPATIENT
Start: 2020-02-02 | End: 2020-02-05

## 2020-02-02 RX ORDER — CEFTRIAXONE 1 G/50ML
1000 INJECTION, SOLUTION INTRAVENOUS ONCE
Status: COMPLETED | OUTPATIENT
Start: 2020-02-02 | End: 2020-02-03

## 2020-02-02 RX ORDER — ONDANSETRON 2 MG/ML
4 INJECTION INTRAMUSCULAR; INTRAVENOUS EVERY 6 HOURS PRN
Status: DISCONTINUED | OUTPATIENT
Start: 2020-02-02 | End: 2020-02-11 | Stop reason: HOSPADM

## 2020-02-02 RX ORDER — QUETIAPINE 200 MG/1
200 TABLET, FILM COATED, EXTENDED RELEASE ORAL EVERY MORNING
Status: DISCONTINUED | OUTPATIENT
Start: 2020-02-03 | End: 2020-02-11 | Stop reason: HOSPADM

## 2020-02-02 RX ORDER — ACETAMINOPHEN 325 MG/1
650 TABLET ORAL EVERY 6 HOURS PRN
Status: DISCONTINUED | OUTPATIENT
Start: 2020-02-02 | End: 2020-02-11 | Stop reason: HOSPADM

## 2020-02-02 RX ORDER — GUAIFENESIN 600 MG
600 TABLET, EXTENDED RELEASE 12 HR ORAL 2 TIMES DAILY
Status: DISCONTINUED | OUTPATIENT
Start: 2020-02-02 | End: 2020-02-03

## 2020-02-02 RX ORDER — METOPROLOL SUCCINATE 100 MG/1
200 TABLET, EXTENDED RELEASE ORAL ONCE
Status: COMPLETED | OUTPATIENT
Start: 2020-02-02 | End: 2020-02-02

## 2020-02-02 RX ORDER — LOSARTAN POTASSIUM 50 MG/1
50 TABLET ORAL 2 TIMES DAILY
Status: DISCONTINUED | OUTPATIENT
Start: 2020-02-02 | End: 2020-02-11 | Stop reason: HOSPADM

## 2020-02-02 RX ORDER — CHLORAL HYDRATE 500 MG
2000 CAPSULE ORAL 2 TIMES DAILY
Status: DISCONTINUED | OUTPATIENT
Start: 2020-02-03 | End: 2020-02-06

## 2020-02-02 RX ORDER — ERGOCALCIFEROL 1.25 MG/1
50000 CAPSULE ORAL WEEKLY
Status: DISCONTINUED | OUTPATIENT
Start: 2020-02-03 | End: 2020-02-11 | Stop reason: HOSPADM

## 2020-02-02 RX ORDER — LIDOCAINE 50 MG/G
1 PATCH TOPICAL DAILY
Status: DISCONTINUED | OUTPATIENT
Start: 2020-02-03 | End: 2020-02-11 | Stop reason: HOSPADM

## 2020-02-02 RX ORDER — ALPRAZOLAM 0.5 MG/1
2 TABLET ORAL
Status: DISCONTINUED | OUTPATIENT
Start: 2020-02-02 | End: 2020-02-11 | Stop reason: HOSPADM

## 2020-02-02 RX ADMIN — CEFTRIAXONE 1000 MG: 1 INJECTION, SOLUTION INTRAVENOUS at 19:27

## 2020-02-02 RX ADMIN — KETOROLAC TROMETHAMINE 15 MG: 30 INJECTION, SOLUTION INTRAMUSCULAR at 20:01

## 2020-02-02 RX ADMIN — AZITHROMYCIN 500 MG: 500 INJECTION, POWDER, LYOPHILIZED, FOR SOLUTION INTRAVENOUS at 20:33

## 2020-02-02 RX ADMIN — SODIUM CHLORIDE 1000 ML: 0.9 INJECTION, SOLUTION INTRAVENOUS at 19:10

## 2020-02-02 RX ADMIN — METOPROLOL SUCCINATE 200 MG: 100 TABLET, EXTENDED RELEASE ORAL at 19:59

## 2020-02-02 RX ADMIN — IPRATROPIUM BROMIDE AND ALBUTEROL SULFATE 3 ML: 2.5; .5 SOLUTION RESPIRATORY (INHALATION) at 20:11

## 2020-02-02 RX ADMIN — ACETAMINOPHEN 975 MG: 325 TABLET, FILM COATED ORAL at 19:17

## 2020-02-02 RX ADMIN — IPRATROPIUM BROMIDE AND ALBUTEROL SULFATE 3 ML: 2.5; .5 SOLUTION RESPIRATORY (INHALATION) at 21:58

## 2020-02-02 RX ADMIN — IOHEXOL 100 ML: 350 INJECTION, SOLUTION INTRAVENOUS at 21:24

## 2020-02-03 ENCOUNTER — APPOINTMENT (INPATIENT)
Dept: NON INVASIVE DIAGNOSTICS | Facility: HOSPITAL | Age: 60
DRG: 871 | End: 2020-02-03
Payer: MEDICARE

## 2020-02-03 PROBLEM — R79.89 ELEVATED BRAIN NATRIURETIC PEPTIDE (BNP) LEVEL: Status: ACTIVE | Noted: 2020-02-03

## 2020-02-03 PROBLEM — I50.22 CHRONIC SYSTOLIC CONGESTIVE HEART FAILURE (HCC): Status: ACTIVE | Noted: 2020-02-03

## 2020-02-03 PROBLEM — J44.1 COPD EXACERBATION (HCC): Status: RESOLVED | Noted: 2020-02-02 | Resolved: 2020-02-03

## 2020-02-03 PROBLEM — R19.7 DIARRHEA: Status: ACTIVE | Noted: 2020-02-03

## 2020-02-03 LAB
ANION GAP SERPL CALCULATED.3IONS-SCNC: 9 MMOL/L (ref 4–13)
BACTERIA UR QL AUTO: ABNORMAL /HPF
BASOPHILS # BLD AUTO: 0.02 THOUSANDS/ΜL (ref 0–0.1)
BASOPHILS NFR BLD AUTO: 0 % (ref 0–1)
BILIRUB UR QL STRIP: NEGATIVE
BUN SERPL-MCNC: 22 MG/DL (ref 5–25)
CALCIUM SERPL-MCNC: 8.7 MG/DL (ref 8.3–10.1)
CHLORIDE SERPL-SCNC: 97 MMOL/L (ref 100–108)
CLARITY UR: CLEAR
CO2 SERPL-SCNC: 25 MMOL/L (ref 21–32)
COLOR UR: YELLOW
CREAT SERPL-MCNC: 0.98 MG/DL (ref 0.6–1.3)
EOSINOPHIL # BLD AUTO: 0.01 THOUSAND/ΜL (ref 0–0.61)
EOSINOPHIL NFR BLD AUTO: 0 % (ref 0–6)
ERYTHROCYTE [DISTWIDTH] IN BLOOD BY AUTOMATED COUNT: 13.2 % (ref 11.6–15.1)
GFR SERPL CREATININE-BSD FRML MDRD: 83 ML/MIN/1.73SQ M
GLUCOSE SERPL-MCNC: 172 MG/DL (ref 65–140)
GLUCOSE SERPL-MCNC: 246 MG/DL (ref 65–140)
GLUCOSE SERPL-MCNC: 284 MG/DL (ref 65–140)
GLUCOSE SERPL-MCNC: 326 MG/DL (ref 65–140)
GLUCOSE SERPL-MCNC: 385 MG/DL (ref 65–140)
GLUCOSE SERPL-MCNC: 472 MG/DL (ref 65–140)
GLUCOSE UR STRIP-MCNC: ABNORMAL MG/DL
HCT VFR BLD AUTO: 32.8 % (ref 36.5–49.3)
HGB BLD-MCNC: 10.9 G/DL (ref 12–17)
HGB UR QL STRIP.AUTO: NEGATIVE
IMM GRANULOCYTES # BLD AUTO: 0.12 THOUSAND/UL (ref 0–0.2)
IMM GRANULOCYTES NFR BLD AUTO: 1 % (ref 0–2)
KETONES UR STRIP-MCNC: NEGATIVE MG/DL
L PNEUMO1 AG UR QL IA.RAPID: NEGATIVE
LEUKOCYTE ESTERASE UR QL STRIP: NEGATIVE
LYMPHOCYTES # BLD AUTO: 2.29 THOUSANDS/ΜL (ref 0.6–4.47)
LYMPHOCYTES NFR BLD AUTO: 15 % (ref 14–44)
MAGNESIUM SERPL-MCNC: 2.9 MG/DL (ref 1.6–2.6)
MCH RBC QN AUTO: 31.9 PG (ref 26.8–34.3)
MCHC RBC AUTO-ENTMCNC: 33.2 G/DL (ref 31.4–37.4)
MCV RBC AUTO: 96 FL (ref 82–98)
MONOCYTES # BLD AUTO: 1.04 THOUSAND/ΜL (ref 0.17–1.22)
MONOCYTES NFR BLD AUTO: 7 % (ref 4–12)
NEUTROPHILS # BLD AUTO: 11.81 THOUSANDS/ΜL (ref 1.85–7.62)
NEUTS SEG NFR BLD AUTO: 77 % (ref 43–75)
NITRITE UR QL STRIP: NEGATIVE
NON-SQ EPI CELLS URNS QL MICRO: ABNORMAL /HPF
NRBC BLD AUTO-RTO: 0 /100 WBCS
PH UR STRIP.AUTO: 6.5 [PH]
PLATELET # BLD AUTO: 136 THOUSANDS/UL (ref 149–390)
PMV BLD AUTO: 10.6 FL (ref 8.9–12.7)
POTASSIUM SERPL-SCNC: 4.3 MMOL/L (ref 3.5–5.3)
PROCALCITONIN SERPL-MCNC: 4.89 NG/ML
PROCALCITONIN SERPL-MCNC: 5.34 NG/ML
PROT UR STRIP-MCNC: ABNORMAL MG/DL
RBC # BLD AUTO: 3.42 MILLION/UL (ref 3.88–5.62)
RBC #/AREA URNS AUTO: ABNORMAL /HPF
S PNEUM AG UR QL: NEGATIVE
SODIUM SERPL-SCNC: 131 MMOL/L (ref 136–145)
SP GR UR STRIP.AUTO: <=1.005 (ref 1–1.03)
UROBILINOGEN UR QL STRIP.AUTO: 1 E.U./DL
WBC # BLD AUTO: 15.29 THOUSAND/UL (ref 4.31–10.16)
WBC #/AREA URNS AUTO: ABNORMAL /HPF

## 2020-02-03 PROCEDURE — 97167 OT EVAL HIGH COMPLEX 60 MIN: CPT

## 2020-02-03 PROCEDURE — 87449 NOS EACH ORGANISM AG IA: CPT | Performed by: NURSE PRACTITIONER

## 2020-02-03 PROCEDURE — 83735 ASSAY OF MAGNESIUM: CPT | Performed by: NURSE PRACTITIONER

## 2020-02-03 PROCEDURE — 93306 TTE W/DOPPLER COMPLETE: CPT

## 2020-02-03 PROCEDURE — 94668 MNPJ CHEST WALL SBSQ: CPT

## 2020-02-03 PROCEDURE — 84145 PROCALCITONIN (PCT): CPT | Performed by: NURSE PRACTITIONER

## 2020-02-03 PROCEDURE — 94660 CPAP INITIATION&MGMT: CPT

## 2020-02-03 PROCEDURE — 94640 AIRWAY INHALATION TREATMENT: CPT

## 2020-02-03 PROCEDURE — 81001 URINALYSIS AUTO W/SCOPE: CPT | Performed by: NURSE PRACTITIONER

## 2020-02-03 PROCEDURE — 94760 N-INVAS EAR/PLS OXIMETRY 1: CPT

## 2020-02-03 PROCEDURE — 97535 SELF CARE MNGMENT TRAINING: CPT

## 2020-02-03 PROCEDURE — 85025 COMPLETE CBC W/AUTO DIFF WBC: CPT | Performed by: NURSE PRACTITIONER

## 2020-02-03 PROCEDURE — 99223 1ST HOSP IP/OBS HIGH 75: CPT | Performed by: PHYSICIAN ASSISTANT

## 2020-02-03 PROCEDURE — 82948 REAGENT STRIP/BLOOD GLUCOSE: CPT

## 2020-02-03 PROCEDURE — 93306 TTE W/DOPPLER COMPLETE: CPT | Performed by: INTERNAL MEDICINE

## 2020-02-03 PROCEDURE — 87205 SMEAR GRAM STAIN: CPT | Performed by: NURSE PRACTITIONER

## 2020-02-03 PROCEDURE — 80048 BASIC METABOLIC PNL TOTAL CA: CPT | Performed by: NURSE PRACTITIONER

## 2020-02-03 PROCEDURE — 87070 CULTURE OTHR SPECIMN AEROBIC: CPT | Performed by: NURSE PRACTITIONER

## 2020-02-03 PROCEDURE — 99232 SBSQ HOSP IP/OBS MODERATE 35: CPT | Performed by: INTERNAL MEDICINE

## 2020-02-03 PROCEDURE — 87081 CULTURE SCREEN ONLY: CPT | Performed by: NURSE PRACTITIONER

## 2020-02-03 PROCEDURE — 97163 PT EVAL HIGH COMPLEX 45 MIN: CPT

## 2020-02-03 RX ORDER — ACETYLCYSTEINE 200 MG/ML
3 SOLUTION ORAL; RESPIRATORY (INHALATION)
Status: DISCONTINUED | OUTPATIENT
Start: 2020-02-03 | End: 2020-02-11

## 2020-02-03 RX ORDER — GUAIFENESIN 600 MG
1200 TABLET, EXTENDED RELEASE 12 HR ORAL 2 TIMES DAILY
Status: DISCONTINUED | OUTPATIENT
Start: 2020-02-03 | End: 2020-02-11 | Stop reason: HOSPADM

## 2020-02-03 RX ORDER — QUETIAPINE FUMARATE 100 MG/1
400 TABLET, FILM COATED ORAL
Status: DISCONTINUED | OUTPATIENT
Start: 2020-02-03 | End: 2020-02-11 | Stop reason: HOSPADM

## 2020-02-03 RX ADMIN — PREGABALIN 50 MG: 50 CAPSULE ORAL at 08:14

## 2020-02-03 RX ADMIN — BUSPIRONE HYDROCHLORIDE 15 MG: 10 TABLET ORAL at 08:15

## 2020-02-03 RX ADMIN — IPRATROPIUM BROMIDE 0.5 MG: 0.5 SOLUTION RESPIRATORY (INHALATION) at 14:12

## 2020-02-03 RX ADMIN — INSULIN LISPRO 20 UNITS: 100 INJECTION, SOLUTION INTRAVENOUS; SUBCUTANEOUS at 11:36

## 2020-02-03 RX ADMIN — QUETIAPINE FUMARATE 200 MG: 200 TABLET, EXTENDED RELEASE ORAL at 08:19

## 2020-02-03 RX ADMIN — Medication 250 MG: at 17:18

## 2020-02-03 RX ADMIN — QUETIAPINE FUMARATE 400 MG: 100 TABLET ORAL at 00:34

## 2020-02-03 RX ADMIN — SERTRALINE HYDROCHLORIDE 50 MG: 50 TABLET ORAL at 00:21

## 2020-02-03 RX ADMIN — LEVALBUTEROL HYDROCHLORIDE 1.25 MG: 1.25 SOLUTION, CONCENTRATE RESPIRATORY (INHALATION) at 20:11

## 2020-02-03 RX ADMIN — IPRATROPIUM BROMIDE 0.5 MG: 0.5 SOLUTION RESPIRATORY (INHALATION) at 07:22

## 2020-02-03 RX ADMIN — IPRATROPIUM BROMIDE 0.5 MG: 0.5 SOLUTION RESPIRATORY (INHALATION) at 20:11

## 2020-02-03 RX ADMIN — AZITHROMYCIN MONOHYDRATE 500 MG: 250 TABLET ORAL at 20:21

## 2020-02-03 RX ADMIN — PANTOPRAZOLE SODIUM 40 MG: 40 TABLET, DELAYED RELEASE ORAL at 21:52

## 2020-02-03 RX ADMIN — Medication 2000 MG: at 17:17

## 2020-02-03 RX ADMIN — PREGABALIN 50 MG: 50 CAPSULE ORAL at 21:52

## 2020-02-03 RX ADMIN — GUAIFENESIN 600 MG: 600 TABLET, EXTENDED RELEASE ORAL at 08:17

## 2020-02-03 RX ADMIN — INSULIN LISPRO 5 UNITS: 100 INJECTION, SOLUTION INTRAVENOUS; SUBCUTANEOUS at 07:46

## 2020-02-03 RX ADMIN — PREGABALIN 50 MG: 50 CAPSULE ORAL at 00:43

## 2020-02-03 RX ADMIN — CEFTRIAXONE 2000 MG: 2 INJECTION, SOLUTION INTRAVENOUS at 20:21

## 2020-02-03 RX ADMIN — INSULIN LISPRO 2 UNITS: 100 INJECTION, SOLUTION INTRAVENOUS; SUBCUTANEOUS at 11:36

## 2020-02-03 RX ADMIN — ACETYLCYSTEINE 600 MG: 200 SOLUTION ORAL; RESPIRATORY (INHALATION) at 14:13

## 2020-02-03 RX ADMIN — Medication 250 MG: at 08:19

## 2020-02-03 RX ADMIN — INSULIN LISPRO 1 UNITS: 100 INJECTION, SOLUTION INTRAVENOUS; SUBCUTANEOUS at 21:53

## 2020-02-03 RX ADMIN — PANTOPRAZOLE SODIUM 40 MG: 40 TABLET, DELAYED RELEASE ORAL at 00:20

## 2020-02-03 RX ADMIN — INSULIN LISPRO 20 UNITS: 100 INJECTION, SOLUTION INTRAVENOUS; SUBCUTANEOUS at 16:33

## 2020-02-03 RX ADMIN — LEVALBUTEROL HYDROCHLORIDE 1.25 MG: 1.25 SOLUTION, CONCENTRATE RESPIRATORY (INHALATION) at 07:22

## 2020-02-03 RX ADMIN — GUAIFENESIN 600 MG: 600 TABLET, EXTENDED RELEASE ORAL at 00:21

## 2020-02-03 RX ADMIN — INSULIN GLARGINE 55 UNITS: 100 INJECTION, SOLUTION SUBCUTANEOUS at 00:24

## 2020-02-03 RX ADMIN — INSULIN LISPRO 2 UNITS: 100 INJECTION, SOLUTION INTRAVENOUS; SUBCUTANEOUS at 16:33

## 2020-02-03 RX ADMIN — APIXABAN 5 MG: 5 TABLET, FILM COATED ORAL at 17:16

## 2020-02-03 RX ADMIN — LOSARTAN POTASSIUM 50 MG: 50 TABLET, FILM COATED ORAL at 00:21

## 2020-02-03 RX ADMIN — MAGNESIUM SULFATE HEPTAHYDRATE 1 G: 1 INJECTION, SOLUTION INTRAVENOUS at 00:13

## 2020-02-03 RX ADMIN — ALPRAZOLAM 2 MG: 0.5 TABLET ORAL at 21:52

## 2020-02-03 RX ADMIN — INSULIN LISPRO 20 UNITS: 100 INJECTION, SOLUTION INTRAVENOUS; SUBCUTANEOUS at 07:45

## 2020-02-03 RX ADMIN — BUSPIRONE HYDROCHLORIDE 15 MG: 10 TABLET ORAL at 00:21

## 2020-02-03 RX ADMIN — QUETIAPINE FUMARATE 400 MG: 100 TABLET ORAL at 21:52

## 2020-02-03 RX ADMIN — ERGOCALCIFEROL 50000 UNITS: 1.25 CAPSULE ORAL at 08:16

## 2020-02-03 RX ADMIN — BUSPIRONE HYDROCHLORIDE 15 MG: 10 TABLET ORAL at 17:16

## 2020-02-03 RX ADMIN — LEVALBUTEROL HYDROCHLORIDE 0.63 MG: 0.63 SOLUTION RESPIRATORY (INHALATION) at 12:38

## 2020-02-03 RX ADMIN — PREGABALIN 50 MG: 50 CAPSULE ORAL at 16:37

## 2020-02-03 RX ADMIN — APIXABAN 5 MG: 5 TABLET, FILM COATED ORAL at 08:14

## 2020-02-03 RX ADMIN — PREDNISONE 10 MG: 10 TABLET ORAL at 08:19

## 2020-02-03 RX ADMIN — LEVALBUTEROL HYDROCHLORIDE 1.25 MG: 1.25 SOLUTION, CONCENTRATE RESPIRATORY (INHALATION) at 14:13

## 2020-02-03 RX ADMIN — LEVALBUTEROL HYDROCHLORIDE 0.63 MG: 0.63 SOLUTION RESPIRATORY (INHALATION) at 02:06

## 2020-02-03 RX ADMIN — Medication 2000 MG: at 08:20

## 2020-02-03 RX ADMIN — DIGOXIN 250 MCG: 125 TABLET ORAL at 08:16

## 2020-02-03 RX ADMIN — FLUTICASONE FUROATE AND VILANTEROL TRIFENATATE 1 PUFF: 100; 25 POWDER RESPIRATORY (INHALATION) at 08:17

## 2020-02-03 RX ADMIN — SERTRALINE HYDROCHLORIDE 50 MG: 50 TABLET ORAL at 21:52

## 2020-02-03 RX ADMIN — APIXABAN 5 MG: 5 TABLET, FILM COATED ORAL at 00:21

## 2020-02-03 RX ADMIN — INSULIN LISPRO 3 UNITS: 100 INJECTION, SOLUTION INTRAVENOUS; SUBCUTANEOUS at 00:35

## 2020-02-03 RX ADMIN — ATORVASTATIN CALCIUM 40 MG: 40 TABLET, FILM COATED ORAL at 16:34

## 2020-02-03 RX ADMIN — ATORVASTATIN CALCIUM 40 MG: 40 TABLET, FILM COATED ORAL at 00:20

## 2020-02-03 RX ADMIN — LIDOCAINE 1 PATCH: 50 PATCH TOPICAL at 00:14

## 2020-02-03 RX ADMIN — ALPRAZOLAM 2 MG: 0.5 TABLET ORAL at 00:21

## 2020-02-03 RX ADMIN — ACETYLCYSTEINE 600 MG: 200 SOLUTION ORAL; RESPIRATORY (INHALATION) at 20:11

## 2020-02-03 RX ADMIN — METOPROLOL SUCCINATE 200 MG: 100 TABLET, EXTENDED RELEASE ORAL at 08:18

## 2020-02-03 RX ADMIN — GUAIFENESIN 1200 MG: 600 TABLET, EXTENDED RELEASE ORAL at 17:17

## 2020-02-03 RX ADMIN — ASPIRIN 81 MG 81 MG: 81 TABLET ORAL at 08:15

## 2020-02-03 RX ADMIN — CEFTRIAXONE 1000 MG: 1 INJECTION, SOLUTION INTRAVENOUS at 00:12

## 2020-02-03 RX ADMIN — INSULIN GLARGINE 55 UNITS: 100 INJECTION, SOLUTION SUBCUTANEOUS at 21:53

## 2020-02-03 NOTE — ASSESSMENT & PLAN NOTE
Lab Results   Component Value Date    HGBA1C 8 3 (H) 01/06/2020       Recent Labs     02/03/20  0016   POCGLU 326*       Blood Sugar Average: Last 72 hrs:  (P) 326 continue Lyrica

## 2020-02-03 NOTE — ASSESSMENT & PLAN NOTE
Chest x-ray unremarkable  CT chest - Right basilar consolidation with air bronchograms  Left basilar relaxation atelectasis  Flu/RSV negative  Pro count 4 point  Treat as community-acquired pneumonia  · Continue Rocephin 2 gm IV daily (weight based), day #3, discontinued azithromycin as patient's urine antigens negative    · Continue Atrovent/Xopenex TID, Xopenex PRN, Mucinex  · MRSA screen negative  · Blood cultures as noted above  · Patient needs aggressive mucus clearance with acapella valve + IS 10x/hr while awake    · Pulmonary following, appreciate input  · Procalcitonin trending down  · Repeat CBC with diff, procalcitonin in am

## 2020-02-03 NOTE — ASSESSMENT & PLAN NOTE
Patient presented with uncontrolled AFib  EKG AFib, heart rate 125  · Heart rate improved with home medications, continue Toprol  mg daily and digoxin 250 mcg daily  · Tele - controlled AFib  Will discontinue    · Monitor electrolytes, keep potassium > 4 and magnesium > 2  · Continue Eliquis

## 2020-02-03 NOTE — SOCIAL WORK
LOS day 1  Pt is not a bundle or readmission  CM met with pt at bedside to discuss CM role and DCP  Pt lives alone in a 2nd fl apt with elevator in buidling  No steps to enetr apt building  Pt fully independent with ADLs  Pt does not drive, uses [employer] to get to and from MD appts  For groceries, pt states he uses [employer] home delivery  Pt has home O2 from [employer] - 2L on exertion when needed - using it at home for approx the past two years  Pt piedad a rollator, cane and RW at home  Pt states he tobacco history for at least 20 years straight - 3-4 packs/per day, no longer smokes  PCP: Dr Osmin Pittman:  Krysten Wahl - for mail delivery uses [address]  Insurance: Medicare A/B, [facility]    DCP:  Discussed DCP with pt  Pt chose [facility] form the [facility]  list if [facility]  is needed at discharge  Pt will be a wheel chair Suma Abdi with [facility] for discharge  CM reviewed discharge planning process including the following: identifying caregivers at home, preference for d/c planning needs, availability of treatment team to discuss questions or concerns patient and/or family may have regarding diagnosis, plan of care, old or new medications and discharge planning  Discharge Checklist reviewed and CM will continue to monitor for progress toward discharge goals in nursing and provider rounds
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
## 2020-02-03 NOTE — ASSESSMENT & PLAN NOTE
Lab Results   Component Value Date    HGBA1C 8 3 (H) 01/06/2020       No results for input(s): POCGLU in the last 72 hours  Blood Sugar Average: Last 72 hrs:   A1c 8 3, suboptimal control of diabetes  Continue Lantus 55 units HS, continue mealtime insulin 20 units t i d , continue SSI  Hold metformin  Glucose 370 likely secondary to p o  Prednisone  Diabetic diet    Patient follows Dr Francy Price as outpatient

## 2020-02-03 NOTE — ASSESSMENT & PLAN NOTE
Lab Results   Component Value Date    HGBA1C 8 3 (H) 01/06/2020       No results for input(s): POCGLU in the last 72 hours      Blood Sugar Average: Last 72 hrs:   continue Lyrica

## 2020-02-03 NOTE — PROGRESS NOTES
Progress Note Cha Sanon 1960, 60 y o  male MRN: [id number]    Unit/Bed#: [address] [place] [address] Encounter: [id number]    Primary Care Provider: Mary Hooper MD   Date and time admitted to hospital: 2/2/2020  6:55 PM        * Acute on chronic respiratory failure with hypoxia ([facility] [address] )  Assessment & Plan  · Likely secondary to right lower lobe pneumonia, left lower lobe atelectasis, mild COPD exacerbation  · Patient uses oxygen 2 L via nasal cannula with activity at home chronically  Uses BiPAP 12/5 with oxygen 2 L at HS for SHAVONNE  · Currently requiring 5 L nasal cannula and BiPAP at night  · Treat underlying causes as below  · Appreciate pulmonology input    Community acquired pneumonia  Assessment & Plan  Chest x-ray unremarkable  CT chest - Right basilar consolidation with air bronchograms  Left basilar relaxation atelectasis  Flu/RSV negative  Pro count 4 point  Treat as community-acquired pneumonia  · Continue Rocephin 2 gm IV daily (weight based) + Zithromax, day #2 in ED  · Continue Atrovent/Xopenex TID, Xopenex PRN, Mucinex  · Follow-up pending urine antigens, sputum Gram stain and culture, MRSA surveillance culture, and blood cultures  · Patient needs aggressive mucus clearance with acapella valve + IS 10x/hr while awake    · Pulmonary following, appreciate input  · Repeat CBC with diff, procalcitonin in am    Severe sepsis (HCC)  Assessment & Plan  POA  As evidenced by fever 102 1, tachycardia, leukocytosis, with source of infection right lower lobe pneumonia  Lactic acid 2 7  Normalized after receiving normal saline 1000 mL in ED  Procalcitonin 4 89  Blood cultures x2 pending  · Antibiotic as above  Follow-up pending cultures  Trend procalcitonin  Repeat CBC/D in am    Diarrhea  Assessment & Plan  Patient reports diarrhea uncontrollably at [facility] for past couple of days at home  Denies antibiotic use in past 3 months  · Send stool for C diff if diarrhea    · Probiotic BID    Elevated brain natriuretic peptide (BNP) level  Assessment & Plan  ProBNP 820  Chest x-ray no pulmonary edema  CT chest - Small right and trace left pleural effusions  Cardiomegaly and left atrial enlargement  Trace pericardial effusion  2D echo in 8/2019 - markedly reduced EF at 40-45%,mild diffuse hypokinesis,wall thickness was mildly increased  Nuclear stress test in 1/2020 showed normal LV EF  Pedal edema on exam, left worse than right, and trace leg edema bilateral   · Repeat 2D echo  · Daily weight and I&Os  · Hold off on IV diuresis in view of severe sepsis  Chronic a-fib  Assessment & Plan  Patient presented with uncontrolled AFib  EKG AFib, heart rate 125  · Heart rate improved with home medications, continue Toprol  mg daily and digoxin 250 mcg daily  · Tele - controlled AFib  Will discontinue  · Monitor electrolytes, keep potassium > 4 and magnesium > 2  · Continue Eliquis    COPD (chronic obstructive pulmonary disease) ([facility] [address] )  Assessment & Plan  Patient was prescribed prednisone taper 30 -20 -10 mg PO daily for 3 days by his pulmonologist's office last week  Patient had 3 days left of Prednisone  · Will discontinue Prednisone as patient does not have wheezing on exam   · Continue Trelegy inhaler(substitute with Breo + atrovent neb)    Hyponatremia  Assessment & Plan  Sodium 130  Acute on chronic  Baseline sodium appears to be 134-135, likely due to psych medications + PNA  Patient received NS 1000 mL in ED  Na improved from 130 -> 131  Monitor BMP in am     Stage 2 chronic kidney disease  Assessment & Plan  Baseline creatinine 0 9-1 2s  Creatinine stable  Monitor  Obesity (BMI 30-39  9)  Assessment & Plan  Body mass index is 38 05 kg/m²  Diet and lifestyle modification    SHAVONNE treated with BiPAP  Assessment & Plan  Continue BiPAP 12/5 with oxygen, titrate to keep sats above 92%  Uses O2 2L with BiPAP at home       Hyperlipidemia  Assessment & Plan  Continue Lipitor    Type 2 diabetes mellitus with complication, with long-term current use of insulin [facility])  Assessment & Plan  Lab Results   Component Value Date    HGBA1C 8 3 (H) 01/06/2020       Recent Labs     02/03/20  0016 02/03/20  0709 02/03/20  1105   POCGLU 326* 472* 246*       Blood Sugar Average: Last 72 hrs:  (P) 348   A1c 8 3, suboptimal control of diabetes  Continue Lantus 55 units HS, continue mealtime insulin 20 units TID, continue SSI  Hold metformin  Steroid induced hyperglycemia  Per Pulmonary  Okay to discontinue prednisone as patient is not wheezing  Anticipate improvement in blood sugar with discontinuation of prednisone  Diabetic diet  Patient follows Dr Mich Mcdonald as outpatient    Diabetic neuropathy [facility])  Assessment & Plan  Lab Results   Component Value Date    HGBA1C 8 3 (H) 01/06/2020       Recent Labs     02/03/20  0016 02/03/20  0709 02/03/20  1105   POCGLU 326* 472* 246*       Blood Sugar Average: Last 72 hrs:  (P) 348   · Continue Lyrica    Chronic reflux esophagitis  Assessment & Plan  Continue PPI    Benign essential hypertension  Assessment & Plan  · Blood pressure reviewed and acceptable, 115/7  · Continue losartan, metoprolol with holding parameter    CAD (coronary artery disease)  Assessment & Plan  Nuclear stress test 1/2020 was normal   Normal LV systolic function on nuclear stress test   · Continue Metoprolol, Aspirin, Lipitor    Bipolar affective disorder (HCC)  Assessment & Plan  · Continue Xanax, BuSpar, Seroquel, Zoloft  · Monitor      VTE Pharmacologic Prophylaxis:   Pharmacologic: Apixaban (Eliquis)  Mechanical VTE Prophylaxis in Place: Yes    Patient Centered Rounds: I have performed bedside rounds with nursing staff today  Discussions with Specialists or Other Care Team Provider:  Nursing, case management, pulmonary    Education and Discussions with Family / Patient:  I have answered all questions to the best of my ability  Time Spent for Care: 20 minutes    More than 50% of total time spent on counseling and coordination of care as described above  Current Length of Stay: 1 day(s)    Current Patient Status: Inpatient   Certification Statement: The patient will continue to require additional inpatient hospital stay due to Acute on chronic respiratory failure with hypoxia setting of pneumonia    Discharge Plan:  Likely discharge home in 48-72 hours pending progress    Code Status: Level 1 - Full Code      Subjective:   Overall, patient feels weak and fatigued  He is out of bed in chair on 5 L nasal cannula  He has a moist, productive cough  He did have an episode of hemoptysis this morning  He is using his Acapella valve but states he does not have a lot of energy to give it a good try  Denies chest pain, dizziness, headache, abdominal pain, nausea, vomiting, or diarrhea today  Objective:     Vitals:   Temp (24hrs), Av 3 °F (37 4 °C), Min:97 7 °F (36 5 °C), Max:102 1 °F (38 9 °C)    Temp:  [97 7 °F (36 5 °C)-102 1 °F (38 9 °C)] 98 1 °F (36 7 °C)  HR:  [] 94  Resp:  [20-37] 20  BP: ()/(53-75) 115/70  SpO2:  [91 %-97 %] 95 %  Body mass index is 38 05 kg/m²  Input and Output Summary (last 24 hours): Intake/Output Summary (Last 24 hours) at 2/3/2020 1256  Last data filed at 2/3/2020 1201  Gross per 24 hour   Intake 1100 ml   Output 900 ml   Net 200 ml       Physical Exam:     Physical Exam   Constitutional: He is oriented to person, place, and time  He appears well-developed  No distress  Pleasant gentleman resting out of bed in chair on 5 L nasal cannula, morbidly obese, appears tired and weak   HENT:   Head: Normocephalic  Neck: Normal range of motion  Cardiovascular: An irregular rhythm present  Tachycardia present  Heart rate between  bpm   Pulmonary/Chest: Accessory muscle usage (Mild) present  Tachypnea (Mild) noted  No respiratory distress  He has no wheezes   He has rhonchi in the right upper field, the right lower field, the left upper field and the left lower field  He has no rales  Abdominal: Soft  Bowel sounds are normal  He exhibits no distension  There is no tenderness  Musculoskeletal: Normal range of motion  He exhibits edema (trace BLE)  He exhibits no tenderness  Neurological: He is alert and oriented to person, place, and time  Skin: Skin is warm and dry  Capillary refill takes less than 2 seconds  He is not diaphoretic  Psychiatric: He has a normal mood and affect  His behavior is normal    Nursing note and vitals reviewed  Additional Data:     Labs:    Results from last 7 days   Lab Units 02/03/20  0530   WBC Thousand/uL 15 29*   HEMOGLOBIN g/dL 10 9*   HEMATOCRIT % 32 8*   PLATELETS Thousands/uL 136*   NEUTROS PCT % 77*   LYMPHS PCT % 15   MONOS PCT % 7   EOS PCT % 0     Results from last 7 days   Lab Units 02/03/20  0530 02/02/20  1904   POTASSIUM mmol/L 4 3 4 0   CHLORIDE mmol/L 97* 92*   CO2 mmol/L 25 25   BUN mg/dL 22 24   CREATININE mg/dL 0 98 1 25   CALCIUM mg/dL 8 7 9 4   ALK PHOS U/L  --  70   ALT U/L  --  23   AST U/L  --  20     Results from last 7 days   Lab Units 02/02/20  1904   INR  1 08       * I Have Reviewed All Lab Data Listed Above  * Additional Pertinent Lab Tests Reviewed: All Labs Within Last 24 Hours Reviewed    Imaging:    Imaging Reports Reviewed Today Include: CXR, CT C/A/P  Imaging Personally Reviewed by Myself Includes:  None    Recent Cultures (last 7 days):     Results from last 7 days   Lab Units 02/03/20  0619 02/02/20  1908   BLOOD CULTURE   --  Received in Microbiology Lab  Culture in Progress  Received in Microbiology Lab  Culture in Progress     LEGIONELLA URINARY ANTIGEN  Negative  --        Last 24 Hours Medication List:     Current Facility-Administered Medications:  acetaminophen 650 mg Oral Q6H PRN MANAS Yoder   acetylcysteine 3 mL Nebulization TIAPOLINAR Castillo PA-C   ALPRAZolam 2 mg Oral HS MANAS Yoder   apixaban 5 mg Oral BID MANAS Yoder   aspirin 81 mg Oral QAM Breonna Lynne, CRNP   atorvastatin 40 mg Oral Daily With Omar Mcneil, CRNP   cefTRIAXone 2,000 mg Intravenous Q24H Cuiyiaquiles Yumicaela, CRNP   And       azithromycin 500 mg Oral Q24H Cuiyiaquiles Yurik, CRNP   busPIRone 15 mg Oral BID Cuiyiaquiles Yurik, CRNP   digoxin 250 mcg Oral Daily Cuiyiaquiles Yurik, CRNP   ergocalciferol 50,000 Units Oral Weekly Cuiyiaquiles Yurik, CRNP   fish oil 2,000 mg Oral BID Cuiyiaquiles Yurik, CRNP   fluticasone-vilanterol 1 puff Inhalation Daily Cuiyiaquiles Yurik, CRNP   guaiFENesin 1,200 mg Oral BID Reema Saltre PA-C   insulin glargine 55 Units Subcutaneous HS iyin Yurik, CRNP   insulin lispro 1-5 Units Subcutaneous TID AC Cuiyiaquiles Yurik, CRNP   insulin lispro 1-5 Units Subcutaneous HS [facility]   insulin lispro 20 Units Subcutaneous TID With Meals Cuiyiaquiles Yurik, CRNP   ipratropium 0 5 mg Nebulization TID iyiaquiles Yurik, CRNP   levalbuterol 0 63 mg Nebulization Q4H PRN [facility], CRNP   levalbuterol 1 25 mg Nebulization TID [facility], CRNP   lidocaine 1 patch Topical Daily Cuiyiaquiles Yurik, CRNP   losartan 50 mg Oral BID Cuiyiaquiles Yurik, CRNP   metoprolol succinate 200 mg Oral Daily Cuiyiaquiles Yurik, CRNP   ondansetron 4 mg Intravenous Q6H PRN [facility], CRNP   pantoprazole 40 mg Oral HS iyiaquiles Yurik, CRNP   pregabalin 50 mg Oral TID iyiaquiles Yurik, CRNP   QUEtiapine 200 mg Oral QAM Cuiyiaquiles Yurik, CRNP   QUEtiapine 400 mg Oral HS iyiaquiles Yurik, CRNP   saccharomyces boulardii 250 mg Oral BID Cuiyiaquiles Yurik, CRNP   sertraline 50 mg Oral HS Cuiyiaquiles Yuriasher, CRNP   sodium chloride (PF) 3 mL Intravenous PRN Lisa Yoo, CRNP        Today, Patient Was Seen By: MANAS Barney    ** Please Note: Dictation voice to text software may have been used in the creation of this document   **

## 2020-02-03 NOTE — ASSESSMENT & PLAN NOTE
· Likely secondary to right lower lobe pneumonia, left lower lobe atelectasis, mild COPD exacerbation  · Patient uses oxygen 2 L via nasal cannula with activity at home chronically  Uses BiPAP 12/5 with oxygen 2 L at HS for SHAVONNE    · Currently requiring 5 L nasal cannula and BiPAP at night  · Treat underlying causes as below  · Appreciate pulmonology input

## 2020-02-03 NOTE — ASSESSMENT & PLAN NOTE
POA   As evidenced by fever 102 1, tachycardia, leukocytosis, with source of infection right lower lobe pneumonia  Lactic acid 2 7  Normalized after receiving normal saline 1000 mL in ED  Procalcitonin pending  Blood cultures x2 pending  Antibiotic as above  Repeat CBC with diff, procalcitonin in a kenji Fisher

## 2020-02-03 NOTE — ASSESSMENT & PLAN NOTE
Sodium 130  Acute on chronic  Baseline sodium appears to be 134-135,likely due to psych medications  Suspect secondary to pneumonia  Patient received NS 1000 mL in ED  Repeat lab in deisy Huber

## 2020-02-03 NOTE — ASSESSMENT & PLAN NOTE
Patient reports diarrhea uncontrollably at [facility] for past couple of days at home  Denies antibiotic use in past 3 months  Send stool for C diff  Probiotic b i d

## 2020-02-03 NOTE — ASSESSMENT & PLAN NOTE
ProBNP 820  Chest x-ray no pulmonary edema  CT chest - Small right and trace left pleural effusions  Cardiomegaly and left atrial enlargement  Trace pericardial effusion  2D echo in 8/2019 - markedly reduced EF at 40-45%,mild diffuse hypokinesis,wall thickness was mildly increased  Nuclear stress test in 1/2020 showed normal LV EF  Pedal edema on exam, left worse than right, and trace leg edema bilateral   · Repeat 2D echo  · Daily weight and I&Os  · In past 24 hours patient is -600 cc    · Continue to monitor

## 2020-02-03 NOTE — ASSESSMENT & PLAN NOTE
Wt Readings from Last 3 Encounters:   02/02/20 120 kg (265 lb 3 2 oz)   01/02/20 122 kg (269 lb)   12/30/19 122 kg (270 lb)

## 2020-02-03 NOTE — ASSESSMENT & PLAN NOTE
· Likely secondary to right lower lobe pneumonia, left lower lobe atelectasis, mild COPD exacerbation  · Patient uses oxygen 2 L via nasal cannula with activity at home chronically  Uses BiPAP 12/5 with oxygen 2 L at HS for SHAVONNE  · Treat underlying causes  · Patient is on oxygen 5 L via nasal cannula currently, satting mid 90s  · Consult Pulmonary

## 2020-02-03 NOTE — PLAN OF CARE
Problem: Potential for Falls  Goal: Patient will remain free of falls  Description  INTERVENTIONS:  - Assess patient frequently for physical needs  -  Identify cognitive and physical deficits and behaviors that affect risk of falls  -  [place] fall precautions as indicated by assessment   - Educate patient/family on patient safety including physical limitations  - Instruct patient to call for assistance with activity based on assessment  - Modify environment to reduce risk of injury  - Consider OT/PT consult to assist with strengthening/mobility  Outcome: Progressing     Problem: Prexisting or High Potential for Compromised Skin Integrity  Goal: Skin integrity is maintained or improved  Description  INTERVENTIONS:  - Identify patients at risk for skin breakdown  - Assess and monitor skin integrity  - Assess and monitor nutrition and hydration status  - Monitor labs   - Assess for incontinence   - Turn and reposition patient  - Assist with mobility/ambulation  - Relieve pressure over bony prominences  - Avoid friction and shearing  - Provide appropriate hygiene as needed including keeping skin clean and dry  - Evaluate need for skin moisturizer/barrier cream  - Collaborate with interdisciplinary team   - Patient/family teaching  - Consider wound care consult   Outcome: Progressing     Problem: Nutrition/Hydration-ADULT  Goal: Nutrient/Hydration intake appropriate for improving, restoring or maintaining nutritional needs  Description  Monitor and assess patient's nutrition/hydration status for malnutrition  Collaborate with interdisciplinary team and initiate plan and interventions as ordered  Monitor patient's weight and dietary intake as ordered or per policy  Utilize nutrition screening tool and intervene as necessary  Determine patient's food preferences and provide high-protein, high-caloric foods as appropriate       INTERVENTIONS:  - Monitor oral intake, urinary output, labs, and treatment plans  - Assess nutrition and hydration status and recommend course of action  - Evaluate amount of meals eaten  - Assist patient with eating if necessary   - Allow adequate time for meals  - Recommend/ encourage appropriate diets, oral nutritional supplements, and vitamin/mineral supplements  - Order, calculate, and assess calorie counts as needed  - Recommend, monitor, and adjust tube feedings and TPN/PPN based on assessed needs  - Assess need for intravenous fluids  - Provide specific nutrition/hydration education as appropriate  - Include patient/family/caregiver in decisions related to nutrition  Outcome: Progressing     Problem: RESPIRATORY - ADULT  Goal: Achieves optimal ventilation and oxygenation  Description  INTERVENTIONS:  - Assess for changes in respiratory status  - Assess for changes in mentation and behavior  - Position to facilitate oxygenation and minimize respiratory effort  - Oxygen administered by appropriate delivery if ordered  - Initiate smoking cessation education as indicated  Intiate bi-pap  Intiate bronchodilator therapy  Q 6 hours  - Encourage broncho-pulmonary hygiene including cough, deep breathe, Incentive Spirometry  - Assess the need for suctioning and aspirate as needed  - Assess and instruct to report SOB or any respiratory difficulty  - Respiratory Therapy support as indicated   Outcome: Progressing

## 2020-02-03 NOTE — ASSESSMENT & PLAN NOTE
Nuclear stress test 1/2020 was normal   Normal LV systolic function on nuclear stress test   · Continue Metoprolol, Aspirin, Lipitor

## 2020-02-03 NOTE — PHYSICAL THERAPY NOTE
PT TREATMENT     02/03/20 1020   Pain Assessment   Pain Assessment Mon-Baker FACES   Mon-Baker FACES Pain Rating 4  (R rib area with "coughing especially")   Home Living   Type of [address] One level;Elevator   [address] [place] chair   Home Equipment Walker;Electric scooter   Additional Comments patient states walking in apartment without assistive device but holding onto walls and furniture and using electric scooter out of home mostly due to SOB   Prior Function   Level of [place] Independent with ADLs and functional mobility   Lives With Alone   ADL Assistance Independent   IADLs Needs assistance   Comments patient reports ordering food and having delivered and looking into getting a HHA for laundry, household tasks and possibly shower assist as per patient this has been initiated prior to admission   Restrictions/Precautions   Other Precautions Bed Alarm; Chair Alarm; Fall Risk;Pain;O2   General   Additional Pertinent History chart reviewed, patient admitted with SOB, pneumonia  patient now limited due to SOB and requesting to stay in bed at this time     Family/Caregiver Present No   Cognition   Overall Cognitive Status WFL   Arousal/Participation Cooperative   Attention Attends with cues to redirect   Following Commands Follows one step commands with increased time or repetition   Comments patient fatigued and slow with responses at times   RLE Assessment   RLE Assessment   (ROMWFL, strength 3+/4-)   LLE Assessment   LLE Assessment   (ROM WFL, strength 3+/4-)   Coordination   Movements are Fluid and Coordinated 0   Bed Mobility   Supine to Sit 4  Minimal assistance   Additional items Assist x 1   Sit to Supine 4  Minimal assistance   Additional items Assist x 1   Additional Comments patient deferred OOB at this time due to fatigue   Transfers   Additional Comments patient mobilized with OT earlier and now fatigued, required min assist to supervision for gait with roller walker as per OT   Ambulation/Elevation   Gait Assistance Not tested  (patient deferred due to fatigue at this time with testing)   Activity Tolerance   Activity Tolerance Patient limited by fatigue   Nurse Made Aware yes   Assessment   Prognosis Good   Problem List Decreased strength;Decreased range of motion;Decreased endurance; Impaired balance;Decreased mobility; Decreased coordination   Assessment Patient seen for Physical Therapy evaluation  Patient admitted with pneumonia, SOB  Comorbidities affecting patient's physical performance include: bipolar affectivedisorder, obesity, htn, afib, DM with neuropathy, back pain, COPD  Personal factors affecting patient at time of initial evaluation include: elevator to enter home, inability to ambulate household distances, inability to navigate community distances and inability to perform dynamic tasks in community  Prior to admission, patient was independent with functional mobility with rollator or elec scooter  Please find objective findings from Physical Therapy assessment regarding body systems outlined above with impairments and limitations including weakness, impaired balance, decreased endurance, gait deviations, decreased activity tolerance, decreased functional mobility tolerance, fall risk and SOB upon exertion  The Barthel Index was used as a functional outcome tool presenting with a score of 50 today indicating marked limitations of functional mobility and ADLS  Patient's clinical presentation is currently unstable/unpredictable as seen in patient's presentation of changing level of pain, increased fall risk, new onset of impairment of functional mobility, decreased endurance and new onset of weakness  Pt would benefit from continued Physical Therapy treatment to address deficits as defined above and maximize level of functional mobility  As demonstrated by objective findings, the assigned level of complexity for this evaluation is high     Goals   Patient Goals get stronger, go home   STG Expiration Date 02/10/20   Short Term Goal #1 transfers and gait with roller walker supervision   Short Term Goal #2 gait endurance to functional household distances, strength BLEs 4-/5 all to meet patient goal of improved strength and go home   LTG Expiration Date 02/17/20   Long Term Goal #1 transfers and gait with roller walker independently   Long Term Goal #2 gait endurance to functional community distances, strength BLEs 4/5   Plan   Treatment/Interventions ADL retraining;Functional transfer training;LE strengthening/ROM; Elevations; Therapeutic exercise; Endurance training;Patient/family training;Equipment eval/education; Bed mobility;Gait training; Compensatory technique education   PT Frequency 5x/wk   Recommendation   Recommendation Home PT   Barthel Index   Feeding 10   Bathing 0   Grooming Score 0   Dressing Score 5   Bladder Score 10   Bowels Score 10   Toilet Use Score 5   Transfers (Bed/Chair) Score 10   Mobility (Level Surface) Score 0   Stairs Score 0   Barthel Index Score 50   Licensure   [place] License Number  [facility]

## 2020-02-03 NOTE — ASSESSMENT & PLAN NOTE
ProBNP 820  Chest x-ray no pulmonary edema  CT chest - Small right and trace left pleural effusions  Cardiomegaly and left atrial enlargement  Trace pericardial effusion  2D echo in 8/2019 - markedly reduced EF at 40-45%,mild diffuse hypokinesis,wall thickness was mildly increased  Nuclear stress test in 1/2020 showed normal LV EF  Pedal edema on exam, left worse than right,and trace leg edema bilateral   Repeat 2D echo  Daily weight and I&Os    Hold off on IV diuresis in view of severe sepsis

## 2020-02-03 NOTE — ASSESSMENT & PLAN NOTE
Patient reports diarrhea uncontrollably at [facility] for past couple of days at home  Denies antibiotic use in past 3 months  · Patient reports he has not had any bowel movements for the past 3 days  · Will discontinue C diff order    · Probiotic BID

## 2020-02-03 NOTE — ASSESSMENT & PLAN NOTE
Sodium 130  Acute on chronic  Baseline sodium appears to be 134-135, likely due to psych medications + PNA    Patient received NS 1000 mL in ED  Na improved from 130 -> 131  Monitor BMP in am

## 2020-02-03 NOTE — ASSESSMENT & PLAN NOTE
· Blood pressure reviewed and acceptable, 117/79  · Continue losartan, metoprolol with holding parameter

## 2020-02-03 NOTE — ASSESSMENT & PLAN NOTE
Secondary to pneumonia  Normally on 2 L round-the-clock  improving  Today titrated down to 2 L with persistent oxygen saturations 93 95%  Target oxygen saturation 89-92%

## 2020-02-03 NOTE — ASSESSMENT & PLAN NOTE
ProBNP 820  Chest x-ray no pulmonary edema  CT chest - Small right and trace left pleural effusions  Cardiomegaly and left atrial enlargement  Trace pericardial effusion  2D echo in 8/2019 - markedly reduced EF at 40-45%,mild diffuse hypokinesis,wall thickness was mildly increased  Nuclear stress test in 1/2020 showed normal LV EF  Pedal edema on exam, left worse than right, and trace leg edema bilateral   · Repeat 2D echo  · Daily weight and I&Os  · Hold off on IV diuresis in view of severe sepsis

## 2020-02-03 NOTE — ASSESSMENT & PLAN NOTE
Patient was prescribed prednisone taper 30 -20 -10 mg p o  Daily for 3 days by his pulmonologist's office last week  Three days left per patient  Will continue prednisone taper  No wheezing on auscultation, diffuse rhonchi on auscultation    Continue Trelegy inhaler(substitute with Breo + atrovent neb)
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
## 2020-02-03 NOTE — SEPSIS NOTE
Sepsis Note   Graciella Fraction 60 y o  male MRN: [id number]  Unit/Bed#:  CT1 Encounter: [id number]      qSOFA     [address] Name 02/02/20 1930 02/02/20 1915 02/02/20 1905 02/02/20 1900       Altered mental status GCS < 15             Respiratory Rate > / =22  1  1    1     Systolic BP < / =506  0  0  0       Q Sofa Score  1  1  1           Initial Sepsis Screening     Row Name 02/02/20 1946                Is the patient's history suggestive of a new or worsening infection?         Suspected source of infection  pneumonia  -MI        Are two or more of the following signs & symptoms of infection both present and new to the patient? (!) Yes (Proceed)  -MI        Indicate SIRS criteria  Hyperthemia > 38 3C (100 9F); Tachycardia > 90 bpm;Tachypnea > 20 resp per min;Leukocytosis (WBC > 63592 IJL)  -MI        If the answer is yes to both questions, suspicion of sepsis is present          If severe sepsis is present AND tissue hypoperfusion perists in the hour after fluid resuscitation or lactate > 4, the patient meets criteria for SEPTIC SHOCK          Are any of the following organ dysfunction criteria present within 6 hours of suspected infection and SIRS criteria that are NOT considered to be chronic conditions?         Organ dysfunction  Lactate > 2 0 mmol/L  -MI        Date of presentation of severe sepsis          Time of presentation of severe sepsis          Tissue hypoperfusion persists in the hour after crystalloid fluid administration, evidenced, by either:          Was hypotension present within one hour of the conclusion of crystalloid fluid administration?   No  -MI        Date of presentation of septic shock          Time of presentation of septic shock            User Key  (r) = Recorded By, (t) = Taken By, (c) = Cosigned By    Initials Name Provider Type    MI Harrison Brennan MD Physician

## 2020-02-03 NOTE — ASSESSMENT & PLAN NOTE
Chest x-ray unremarkable  CT chest - Right basilar consolidation with air bronchograms  Left basilar relaxation atelectasis  Treat as community-acquired pneumonia  Patient received IV Rocephin, Zithromax in ED  Will continue  But increase Rocephin to 2 g daily due to weight  Start Atrovent/Xopenex t i d , Atrovent p r n , Mucinex  Check urine antigens, sputum Gram stain and culture, MRSA surveillance culture  Procalcitonin and blood cultures pending  Incentive spirometer  Repeat CBC with diff, procalcitonin in a kenji iFsher

## 2020-02-03 NOTE — ED NOTES
Pt transported to [address] in [address] [place] [address], [address] [place] [address]  02/02/20 2109 0

## 2020-02-03 NOTE — ASSESSMENT & PLAN NOTE
Continue metoprolol, aspirin, Lipitor    Nuclear stress test 1/2020 was normal   Normal LV systolic function on nuclear stress test

## 2020-02-03 NOTE — ASSESSMENT & PLAN NOTE
Lab Results   Component Value Date    HGBA1C 8 3 (H) 01/06/2020       Recent Labs     02/03/20  0016   POCGLU 326*       Blood Sugar Average: Last 72 hrs:  (P) 326   A1c 8 3, suboptimal control of diabetes  Continue Lantus 55 units HS, continue mealtime insulin 20 units TID, continue SSI  Hold metformin  Steroid induced hyperglycemia  Per Pulmonary  Okay to discontinue prednisone as patient is not wheezing  Anticipate improvement in blood sugar with discontinuation of prednisone  Diabetic diet    Patient follows Dr Delaney Bo as outpatient

## 2020-02-03 NOTE — ASSESSMENT & PLAN NOTE
POA   As evidenced by fever 102 1, tachycardia, leukocytosis, with source of infection right lower lobe pneumonia  Lactic acid 2 7  Normalized after receiving normal saline 1000 mL in ED  Procalcitonin 4 89, decreased to 3 00    Blood cultures drawn; one of two sets + for gram + cocci; suspect contamination  Second set is negative to date  · Antibiotic as above     · Repeat procalcitonin in am   · Repeat cbc in am

## 2020-02-03 NOTE — ASSESSMENT & PLAN NOTE
· Blood pressure reviewed and acceptable, 115/7  · Continue losartan, metoprolol with holding parameter

## 2020-02-03 NOTE — ASSESSMENT & PLAN NOTE
Patient has chronic bronchitis  Normally on Trelegy, Ventolin, albuterol nebulizers  Breo in hospital > d/c on disharge and resume Trelegy

## 2020-02-03 NOTE — PLAN OF CARE
Problem: Potential for Falls  Goal: Patient will remain free of falls  Description  INTERVENTIONS:  - Assess patient frequently for physical needs  -  Identify cognitive and physical deficits and behaviors that affect risk of falls  -  [place] fall precautions as indicated by assessment   - Educate patient/family on patient safety including physical limitations  - Instruct patient to call for assistance with activity based on assessment  - Modify environment to reduce risk of injury  - Consider OT/PT consult to assist with strengthening/mobility  Outcome: Progressing     Problem: Prexisting or High Potential for Compromised Skin Integrity  Goal: Skin integrity is maintained or improved  Description  INTERVENTIONS:  - Identify patients at risk for skin breakdown  - Assess and monitor skin integrity  - Assess and monitor nutrition and hydration status  - Monitor labs   - Assess for incontinence   - Turn and reposition patient  - Assist with mobility/ambulation  - Relieve pressure over bony prominences  - Avoid friction and shearing  - Provide appropriate hygiene as needed including keeping skin clean and dry  - Evaluate need for skin moisturizer/barrier cream  - Collaborate with interdisciplinary team   - Patient/family teaching  - Consider wound care consult   Outcome: Progressing     Problem: Nutrition/Hydration-ADULT  Goal: Nutrient/Hydration intake appropriate for improving, restoring or maintaining nutritional needs  Description  Monitor and assess patient's nutrition/hydration status for malnutrition  Collaborate with interdisciplinary team and initiate plan and interventions as ordered  Monitor patient's weight and dietary intake as ordered or per policy  Utilize nutrition screening tool and intervene as necessary  Determine patient's food preferences and provide high-protein, high-caloric foods as appropriate       INTERVENTIONS:  - Monitor oral intake, urinary output, labs, and treatment plans  - Assess nutrition and hydration status and recommend course of action  - Evaluate amount of meals eaten  - Assist patient with eating if necessary   - Allow adequate time for meals  - Recommend/ encourage appropriate diets, oral nutritional supplements, and vitamin/mineral supplements  - Order, calculate, and assess calorie counts as needed  - Recommend, monitor, and adjust tube feedings and TPN/PPN based on assessed needs  - Assess need for intravenous fluids  - Provide specific nutrition/hydration education as appropriate  - Include patient/family/caregiver in decisions related to nutrition  Outcome: Progressing     Problem: RESPIRATORY - ADULT  Goal: Achieves optimal ventilation and oxygenation  Description  INTERVENTIONS:  - Assess for changes in respiratory status  - Assess for changes in mentation and behavior  - Position to facilitate oxygenation and minimize respiratory effort  - Oxygen administered by appropriate delivery if ordered  - Initiate smoking cessation education as indicated  Intiate bi-pap  Intiate bronchodilator therapy  Q 6 hours  - Encourage broncho-pulmonary hygiene including cough, deep breathe, Incentive Spirometry  - Assess the need for suctioning and aspirate as needed  - Assess and instruct to report SOB or any respiratory difficulty  - Respiratory Therapy support as indicated   Outcome: Progressing

## 2020-02-03 NOTE — CONSULTS
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 Test not performed  Suggest repeat specimen  2019    SPUTUMCULTUR Test not performed  Suggest repeat specimen   2017    SPUTUMCULTUR 1+ Growth of Staphylococcus aureus 2017    SPUTUMCULTUR 1+ Growth of Mixed Respiratory Francine 2017    MRSACULTURE  2019     No Methicillin Resistant Staphlyococcus aureus (MRSA) isolated        ______________________________________

## 2020-02-03 NOTE — ASSESSMENT & PLAN NOTE
Lab Results   Component Value Date    HGBA1C 8 3 (H) 01/06/2020       Recent Labs     02/03/20  0016 02/03/20  0709 02/03/20  1105   POCGLU 326* 472* 246*       Blood Sugar Average: Last 72 hrs:  (P) 348   A1c 8 3, suboptimal control of diabetes  Continue Lantus 55 units HS, continue mealtime insulin 20 units TID, continue SSI  Hold metformin  Steroid induced hyperglycemia  Per Pulmonary  Okay to discontinue prednisone as patient is not wheezing  Anticipate improvement in blood sugar with discontinuation of prednisone  Diabetic diet    Patient follows Dr Francy Price as outpatient

## 2020-02-03 NOTE — ASSESSMENT & PLAN NOTE
POA   As evidenced by fever 102 1, tachycardia, leukocytosis, with source of infection right lower lobe pneumonia  Lactic acid 2 7  Normalized after receiving normal saline 1000 mL in ED  Procalcitonin 4 89  Blood cultures x2 pending  · Antibiotic as above  Follow-up pending cultures  Trend procalcitonin    Repeat CBC/D in am

## 2020-02-03 NOTE — ASSESSMENT & PLAN NOTE
Patient was prescribed prednisone taper 30 -20 -10 mg PO daily for 3 days by his pulmonologist's office last week  Patient had 3 days left of Prednisone  · Will discontinue Prednisone as patient does not have wheezing on exam   · Continue Trelegy inhaler(substitute with Breo + atrovent neb)  · Appreciate pulmonology consultation and recommendations

## 2020-02-03 NOTE — ASSESSMENT & PLAN NOTE
Patient reports diarrhea uncontrollably at [facility] for past couple of days at home  Denies antibiotic use in past 3 months  · Send stool for C diff if diarrhea    · Probiotic BID

## 2020-02-03 NOTE — ASSESSMENT & PLAN NOTE
Patient presented with uncontrolled AFib  EKG AFib, heart rate 125  Patient received home medication metoprolol succinate 200mg p o  In ED  Heart rate 's currently  Potassium 4 0, Mag 1 9  Telemetry  Will order Mag 1 g IV x1  Continue digoxin, metoprolol, Eliquis

## 2020-02-03 NOTE — ASSESSMENT & PLAN NOTE
Patient presented with uncontrolled AFib  EKG AFib, heart rate 125  · Heart rate improved with home medications, continue Toprol  mg daily and digoxin 250 mcg daily  · Tele - controlled AFib  Will discontinue  · Monitor electrolytes, keep potassium > 4 and magnesium > 2  · Magnesium 1 9, will give oral Mag oxide for 2 doses    · Continue Eliquis

## 2020-02-03 NOTE — ASSESSMENT & PLAN NOTE
Lab Results   Component Value Date    HGBA1C 8 3 (H) 01/06/2020       Recent Labs     02/03/20  0016 02/03/20  0709 02/03/20  1105   POCGLU 326* 472* 246*       Blood Sugar Average: Last 72 hrs:  (P) 348   · Continue Lyrica

## 2020-02-03 NOTE — ASSESSMENT & PLAN NOTE
Chest x-ray unremarkable  CT chest - Right basilar consolidation with air bronchograms  Left basilar relaxation atelectasis  Flu/RSV negative  Pro count 4 point  Treat as community-acquired pneumonia    · Continue Rocephin 2 gm IV daily (weight based) + Zithromax, dy #2 in ED  · Continue Atrovent/Xopenex TID, Xopenex PRN, Mucinex  · Follow-up pending urine antigens, sputum Gram stain and culture, MRSA surveillance culture, and blood cultures  · Patient needs aggressive mucus clearance with acapella valve + IS 10x/hr while awake    · Pulmonary following, appreciate input  · Repeat CBC with diff, procalcitonin in am

## 2020-02-03 NOTE — ASSESSMENT & PLAN NOTE
Sodium 130  Acute on chronic  Baseline sodium appears to be 134-135, likely due to psych medications + PNA    Patient received NS 1000 mL in ED  Na improved from 130 -> 131->134  Monitor BMP in am

## 2020-02-03 NOTE — OCCUPATIONAL THERAPY NOTE
Occupational Therapy Evaluation/Treatment       02/03/20 0918   Note Type   Note type Eval/Treat   Restrictions/Precautions   Other Precautions Chair Alarm; Bed Alarm; Fall Risk   Pain Assessment   Pain Assessment 0-10   Pain Score 6   Pain Location Chest  ("from coughing")   Home Living   Type of Home Apartment   Home Layout Elevator   Bathroom Shower/Tub Walk-in shower   Bathroom Toilet Standard   Bathroom Equipment Shower chair   Home Equipment Walker  (rollator)   Prior Function   Level of [place] Independent with ADLs and functional mobility   Lives With Alone   Receives Help From   (pt reports they are looking into getting a home health aide )   ADL Assistance Independent   IADLs Needs assistance  (gets groceries delivered )   ADL   Eating Assistance 7  Independent   Grooming Assistance 5  [address] [facility] 5  Supervision/Setup   LB [address] 4  [address] 5  [address] 4  [address]  4  [address] to Supine 4  Minimal assistance   Additional items LE management   Transfers   Sit to Stand 4  Minimal assistance   Additional items Verbal cues   Stand to Sit 4  Minimal assistance   Additional items Verbal cues   Functional Mobility   Functional Mobility 4  Minimal assistance   Additional Comments 5 feet   Additional items Rolling walker   Balance   Static Sitting Fair +   Dynamic Sitting Fair   Static Standing Fair -   Dynamic Standing Fair -   Activity Tolerance   Activity Tolerance Patient limited by fatigue   RUE Assessment   RUE Assessment WFL  (grossly 4/4-/5  MMT)   LUE Assessment   LUE Assessment WFL  (grossly 4/4-/5  MMT)   Cognition   Overall Cognitive Status WFL   Arousal/Participation Cooperative   Attention Attends with cues to redirect   Orientation Level Oriented X4   Following Commands Follows one step commands with increased time or repetition   Comments pt is slow to respond at times    Assessment   Limitation Decreased ADL status; Decreased UE strength;Decreased Safe judgement during ADL;Decreased endurance;Decreased self-care trans;Decreased high-level ADLs  (decreased balance and mobility )   Prognosis Good   Assessment Patient evaluated by Occupational Therapy  Patient admitted with Acute on chronic respiratory failure with hypoxia ([facility] [address] )  The patients occupational profile, medical and therapy history includes a extensive additional review of physical, cognitive, or psychosocial history related to current functional performance  Comorbidities affecting functional mobility and ADLS include: bipolar, CAD, COPD, diabetes and hypertension  Prior to admission, patient was independent with functional mobility with rollator, independent with ADLS and independent with IADLS  The evaluation identifies the following performance deficits: weakness, impaired balance, decreased endurance, increased fall risk, new onset of impairment of functional mobility, decreased ADLS, decreased IADLS, pain, decreased activity tolerance, decreased safety awareness, impaired judgement, SOB upon exertion and decreased strength, that result in activity limitations and/or participation restrictions  This evaluation requires clinical decision making of high complexity, because the patient presents with comorbidites that affect occupational performance and required significant modification of tasks or assistance with consideration of multiple treatment options  The Barthel Index was used as a functional outcome tool presenting with a score of 50, indicating marked limitations of functional mobility and ADLS  Patient will benefit from skilled Occupational Therapy services to address above deficits and facilitate a safe return to prior level of function     Goals   Patient Goals go home, feel better   STG Time Frame   (1-7 days)   Short Term Goal  Goals established to promote patient goal of going home, feel better:  Patient will increase standing tolerance to 3 minutes during ADL task to decrease assistance level and decrease fall risk; Patient will increase bed mobility to supervision in preparation for ADLS and transfers; Patient will increase functional mobility to and from bathroom with rolling walker with supervision to increase performance with ADLS and to use a toilet; Patient will tolerate 10 minutes of UE ROM/strengthening to increase general activity tolerance and performance in ADLS/IADLS; Patient will improve functional activity tolerance to 10 minutes of sustained functional tasks to increase participation in basic self-care and decrease assistance level;  Patient will be able to to verbalize understanding and perform energy conservation/proper body mechanics during ADLS and functional mobility at least 75% of the time with minimal cueing to decrease signs of fatigue and increase stamina to return to prior level of function; Patient will increase dynamic sitting balance to fair+ to improve the ability to sit at edge of bed or on a chair for ADLS;  Patient will increase dynamic standing balance to fair to improve postural stability and decrease fall risk during standing ADLS and transfers  LTG Time Frame   (8-14 days)   Long Term Goal Patient will increase standing tolerance to 6 minutes during ADL task to decrease assistance level and decrease fall risk; Patient will increase bed mobility to independent in preparation for ADLS and transfers;  Patient will increase functional mobility to and from bathroom with rolling walker independently to increase performance with ADLS and to use a toilet; Patient will tolerate 20 minutes of UE ROM/strengthening to increase general activity tolerance and performance in ADLS/IADLS; Patient will improve functional activity tolerance to 20 minutes of sustained functional tasks to increase participation in basic self-care and decrease assistance level;  Patient will be able to to verbalize understanding and perform energy conservation/proper body mechanics during ADLS and functional mobility at least 90% of the time with minimalcueing to decrease signs of fatigue and increase stamina to return to prior level of function; Patient will increase dynamic sitting balance to good to improve the ability to sit at edge of bed or on a chair for ADLS;  Patient will increase dynamic standing balance to fair+ to improve postural stability and decrease fall risk during standing ADLS and transfers  Functional Transfer Goals   Pt Will Perform All Functional Transfers   (STG supervision LTG Independent )   ADL Goals   Pt Will Perform Grooming Standing at sink  (STG supervision LTG Independent )   Pt Will Perform Bathing   (STG supervision LTG Independent )   Pt Will Perform LE Dressing   (STG supervision LTG Independent )   Pt Will Perform Toileting   (STG supervision LTG Independent )   Plan   Treatment Interventions ADL retraining;Functional transfer training;UE strengthening/ROM; Endurance training;Patient/family training;Equipment evaluation/education; Activityengagement; Compensatory technique education; Energy conservation   Goal Expiration Date 02/17/20   OT Frequency 3-5x/wk   Additional Treatment Session   Start Time 0908   End Time 1881   Treatment Assessment Patient required mod assist for sock donning/doffing  Sit to stand supervision  Functional mobility 5 feet with RW supervision  Static standing balance x 3 minutes supervision with RW  Sit to supine min assist for LE management  Completed BUE AROM 10 times each for shoulder flexion, elbow flexion, horizontal abduction and  with rest breaks  Patient is generally weak and deconditioned  Patient is fatigued with activity with poor activity tolerance       Recommendation   OT Discharge Recommendation Home OT   Barthel Index   Feeding 10   Bathing 0   Grooming Score 0   Dressing Score 5   Bladder Score 10   Bowels Score 10   Toilet Use Score 5   Transfers (Bed/Chair) Score 10   Mobility (Level Surface) Score 0   Stairs Score 0   Barthel Index Score 50   Licensure   [place] License Number  Morris Perez Susan Gary 87 OTR/L [id number]

## 2020-02-03 NOTE — ASSESSMENT & PLAN NOTE
· Likely secondary to right lower lobe pneumonia, left lower lobe atelectasis, mild COPD exacerbation  · Patient uses oxygen 2 L via nasal cannula with activity at home chronically  Uses BiPAP 12/5 with oxygen 2 L at HS for SHAVONNE  · Currently requiring 5 L nasal cannula and BiPAP at night  · Patient currently is on day 3 of ceftriaxone; discontinued azithromycin as urine antigens negative    · Appreciate pulmonology input

## 2020-02-04 LAB
ANION GAP SERPL CALCULATED.3IONS-SCNC: 6 MMOL/L (ref 4–13)
ATRIAL RATE: 127 BPM
BASOPHILS # BLD AUTO: 0.04 THOUSANDS/ΜL (ref 0–0.1)
BASOPHILS NFR BLD AUTO: 0 % (ref 0–1)
BUN SERPL-MCNC: 23 MG/DL (ref 5–25)
CALCIUM SERPL-MCNC: 9.7 MG/DL (ref 8.3–10.1)
CHLORIDE SERPL-SCNC: 98 MMOL/L (ref 100–108)
CO2 SERPL-SCNC: 30 MMOL/L (ref 21–32)
CREAT SERPL-MCNC: 0.89 MG/DL (ref 0.6–1.3)
DIGOXIN SERPL-MCNC: 0.5 NG/ML (ref 0.8–2)
EOSINOPHIL # BLD AUTO: 0.05 THOUSAND/ΜL (ref 0–0.61)
EOSINOPHIL NFR BLD AUTO: 1 % (ref 0–6)
ERYTHROCYTE [DISTWIDTH] IN BLOOD BY AUTOMATED COUNT: 13.2 % (ref 11.6–15.1)
GFR SERPL CREATININE-BSD FRML MDRD: 93 ML/MIN/1.73SQ M
GLUCOSE SERPL-MCNC: 169 MG/DL (ref 65–140)
GLUCOSE SERPL-MCNC: 191 MG/DL (ref 65–140)
GLUCOSE SERPL-MCNC: 203 MG/DL (ref 65–140)
GLUCOSE SERPL-MCNC: 254 MG/DL (ref 65–140)
GLUCOSE SERPL-MCNC: 326 MG/DL (ref 65–140)
HCT VFR BLD AUTO: 39.7 % (ref 36.5–49.3)
HGB BLD-MCNC: 13.2 G/DL (ref 12–17)
IMM GRANULOCYTES # BLD AUTO: 0.2 THOUSAND/UL (ref 0–0.2)
IMM GRANULOCYTES NFR BLD AUTO: 2 % (ref 0–2)
LYMPHOCYTES # BLD AUTO: 3.03 THOUSANDS/ΜL (ref 0.6–4.47)
LYMPHOCYTES NFR BLD AUTO: 29 % (ref 14–44)
MAGNESIUM SERPL-MCNC: 1.9 MG/DL (ref 1.6–2.6)
MCH RBC QN AUTO: 31.4 PG (ref 26.8–34.3)
MCHC RBC AUTO-ENTMCNC: 33.2 G/DL (ref 31.4–37.4)
MCV RBC AUTO: 94 FL (ref 82–98)
MONOCYTES # BLD AUTO: 0.64 THOUSAND/ΜL (ref 0.17–1.22)
MONOCYTES NFR BLD AUTO: 6 % (ref 4–12)
MRSA NOSE QL CULT: NORMAL
NEUTROPHILS # BLD AUTO: 6.55 THOUSANDS/ΜL (ref 1.85–7.62)
NEUTS SEG NFR BLD AUTO: 62 % (ref 43–75)
NRBC BLD AUTO-RTO: 0 /100 WBCS
PHOSPHATE SERPL-MCNC: 4.2 MG/DL (ref 2.3–4.1)
PLATELET # BLD AUTO: 193 THOUSANDS/UL (ref 149–390)
PMV BLD AUTO: 10 FL (ref 8.9–12.7)
POTASSIUM SERPL-SCNC: 3.4 MMOL/L (ref 3.5–5.3)
PROCALCITONIN SERPL-MCNC: 3 NG/ML
QRS AXIS: 71 DEGREES
QRSD INTERVAL: 88 MS
QT INTERVAL: 280 MS
QTC INTERVAL: 404 MS
RBC # BLD AUTO: 4.21 MILLION/UL (ref 3.88–5.62)
SODIUM SERPL-SCNC: 134 MMOL/L (ref 136–145)
T WAVE AXIS: -74 DEGREES
VENTRICULAR RATE: 125 BPM
WBC # BLD AUTO: 10.51 THOUSAND/UL (ref 4.31–10.16)

## 2020-02-04 PROCEDURE — 99221 1ST HOSP IP/OBS SF/LOW 40: CPT | Performed by: SPECIALIST

## 2020-02-04 PROCEDURE — 99232 SBSQ HOSP IP/OBS MODERATE 35: CPT | Performed by: NURSE PRACTITIONER

## 2020-02-04 PROCEDURE — 84100 ASSAY OF PHOSPHORUS: CPT | Performed by: NURSE PRACTITIONER

## 2020-02-04 PROCEDURE — 99232 SBSQ HOSP IP/OBS MODERATE 35: CPT | Performed by: PHYSICIAN ASSISTANT

## 2020-02-04 PROCEDURE — 94668 MNPJ CHEST WALL SBSQ: CPT

## 2020-02-04 PROCEDURE — 94760 N-INVAS EAR/PLS OXIMETRY 1: CPT

## 2020-02-04 PROCEDURE — 94660 CPAP INITIATION&MGMT: CPT

## 2020-02-04 PROCEDURE — 80048 BASIC METABOLIC PNL TOTAL CA: CPT | Performed by: NURSE PRACTITIONER

## 2020-02-04 PROCEDURE — 94640 AIRWAY INHALATION TREATMENT: CPT

## 2020-02-04 PROCEDURE — 85025 COMPLETE CBC W/AUTO DIFF WBC: CPT | Performed by: NURSE PRACTITIONER

## 2020-02-04 PROCEDURE — 83735 ASSAY OF MAGNESIUM: CPT | Performed by: NURSE PRACTITIONER

## 2020-02-04 PROCEDURE — 84145 PROCALCITONIN (PCT): CPT | Performed by: NURSE PRACTITIONER

## 2020-02-04 PROCEDURE — 80162 ASSAY OF DIGOXIN TOTAL: CPT | Performed by: INTERNAL MEDICINE

## 2020-02-04 PROCEDURE — 93010 ELECTROCARDIOGRAM REPORT: CPT | Performed by: INTERNAL MEDICINE

## 2020-02-04 PROCEDURE — 97110 THERAPEUTIC EXERCISES: CPT

## 2020-02-04 PROCEDURE — 82948 REAGENT STRIP/BLOOD GLUCOSE: CPT

## 2020-02-04 RX ADMIN — GUAIFENESIN 1200 MG: 600 TABLET, EXTENDED RELEASE ORAL at 09:38

## 2020-02-04 RX ADMIN — INSULIN LISPRO 3 UNITS: 100 INJECTION, SOLUTION INTRAVENOUS; SUBCUTANEOUS at 08:19

## 2020-02-04 RX ADMIN — PREGABALIN 50 MG: 50 CAPSULE ORAL at 16:30

## 2020-02-04 RX ADMIN — ALPRAZOLAM 2 MG: 0.5 TABLET ORAL at 21:27

## 2020-02-04 RX ADMIN — BUSPIRONE HYDROCHLORIDE 15 MG: 10 TABLET ORAL at 09:39

## 2020-02-04 RX ADMIN — PANTOPRAZOLE SODIUM 40 MG: 40 TABLET, DELAYED RELEASE ORAL at 21:27

## 2020-02-04 RX ADMIN — Medication 250 MG: at 09:39

## 2020-02-04 RX ADMIN — INSULIN LISPRO 20 UNITS: 100 INJECTION, SOLUTION INTRAVENOUS; SUBCUTANEOUS at 11:58

## 2020-02-04 RX ADMIN — Medication 2000 MG: at 09:38

## 2020-02-04 RX ADMIN — ACETYLCYSTEINE 600 MG: 200 SOLUTION ORAL; RESPIRATORY (INHALATION) at 13:12

## 2020-02-04 RX ADMIN — QUETIAPINE FUMARATE 400 MG: 100 TABLET ORAL at 21:27

## 2020-02-04 RX ADMIN — LEVALBUTEROL HYDROCHLORIDE 1.25 MG: 1.25 SOLUTION, CONCENTRATE RESPIRATORY (INHALATION) at 19:55

## 2020-02-04 RX ADMIN — APIXABAN 5 MG: 5 TABLET, FILM COATED ORAL at 09:38

## 2020-02-04 RX ADMIN — BUSPIRONE HYDROCHLORIDE 15 MG: 10 TABLET ORAL at 17:43

## 2020-02-04 RX ADMIN — ATORVASTATIN CALCIUM 40 MG: 40 TABLET, FILM COATED ORAL at 16:30

## 2020-02-04 RX ADMIN — DIGOXIN 250 MCG: 125 TABLET ORAL at 09:38

## 2020-02-04 RX ADMIN — LIDOCAINE 1 PATCH: 50 PATCH TOPICAL at 05:21

## 2020-02-04 RX ADMIN — INSULIN GLARGINE 55 UNITS: 100 INJECTION, SOLUTION SUBCUTANEOUS at 21:28

## 2020-02-04 RX ADMIN — ACETYLCYSTEINE 600 MG: 200 SOLUTION ORAL; RESPIRATORY (INHALATION) at 19:56

## 2020-02-04 RX ADMIN — Medication 2000 MG: at 17:43

## 2020-02-04 RX ADMIN — APIXABAN 5 MG: 5 TABLET, FILM COATED ORAL at 17:43

## 2020-02-04 RX ADMIN — INSULIN LISPRO 20 UNITS: 100 INJECTION, SOLUTION INTRAVENOUS; SUBCUTANEOUS at 08:20

## 2020-02-04 RX ADMIN — INSULIN LISPRO 2 UNITS: 100 INJECTION, SOLUTION INTRAVENOUS; SUBCUTANEOUS at 11:57

## 2020-02-04 RX ADMIN — LEVALBUTEROL HYDROCHLORIDE 1.25 MG: 1.25 SOLUTION, CONCENTRATE RESPIRATORY (INHALATION) at 13:12

## 2020-02-04 RX ADMIN — MAGNESIUM OXIDE TAB 400 MG (241.3 MG ELEMENTAL MG) 400 MG: 400 (241.3 MG) TAB at 17:43

## 2020-02-04 RX ADMIN — QUETIAPINE FUMARATE 200 MG: 200 TABLET, EXTENDED RELEASE ORAL at 10:06

## 2020-02-04 RX ADMIN — IPRATROPIUM BROMIDE 0.5 MG: 0.5 SOLUTION RESPIRATORY (INHALATION) at 13:12

## 2020-02-04 RX ADMIN — IPRATROPIUM BROMIDE 0.5 MG: 0.5 SOLUTION RESPIRATORY (INHALATION) at 19:56

## 2020-02-04 RX ADMIN — LOSARTAN POTASSIUM 50 MG: 50 TABLET, FILM COATED ORAL at 09:39

## 2020-02-04 RX ADMIN — FLUTICASONE FUROATE AND VILANTEROL TRIFENATATE 1 PUFF: 100; 25 POWDER RESPIRATORY (INHALATION) at 10:06

## 2020-02-04 RX ADMIN — ACETYLCYSTEINE 3 ML: 200 SOLUTION ORAL; RESPIRATORY (INHALATION) at 07:19

## 2020-02-04 RX ADMIN — METOPROLOL SUCCINATE 200 MG: 100 TABLET, EXTENDED RELEASE ORAL at 09:38

## 2020-02-04 RX ADMIN — PREGABALIN 50 MG: 50 CAPSULE ORAL at 09:38

## 2020-02-04 RX ADMIN — PREGABALIN 50 MG: 50 CAPSULE ORAL at 21:27

## 2020-02-04 RX ADMIN — LOSARTAN POTASSIUM 50 MG: 50 TABLET, FILM COATED ORAL at 17:43

## 2020-02-04 RX ADMIN — INSULIN LISPRO 20 UNITS: 100 INJECTION, SOLUTION INTRAVENOUS; SUBCUTANEOUS at 17:45

## 2020-02-04 RX ADMIN — GUAIFENESIN 1200 MG: 600 TABLET, EXTENDED RELEASE ORAL at 17:43

## 2020-02-04 RX ADMIN — SERTRALINE HYDROCHLORIDE 50 MG: 50 TABLET ORAL at 21:27

## 2020-02-04 RX ADMIN — INSULIN LISPRO 1 UNITS: 100 INJECTION, SOLUTION INTRAVENOUS; SUBCUTANEOUS at 21:28

## 2020-02-04 RX ADMIN — IPRATROPIUM BROMIDE 0.5 MG: 0.5 SOLUTION RESPIRATORY (INHALATION) at 07:20

## 2020-02-04 RX ADMIN — Medication 250 MG: at 17:43

## 2020-02-04 RX ADMIN — ASPIRIN 81 MG 81 MG: 81 TABLET ORAL at 09:39

## 2020-02-04 RX ADMIN — CEFTRIAXONE 2000 MG: 2 INJECTION, SOLUTION INTRAVENOUS at 20:15

## 2020-02-04 RX ADMIN — LEVALBUTEROL HYDROCHLORIDE 1.25 MG: 1.25 SOLUTION, CONCENTRATE RESPIRATORY (INHALATION) at 07:20

## 2020-02-04 RX ADMIN — INSULIN LISPRO 1 UNITS: 100 INJECTION, SOLUTION INTRAVENOUS; SUBCUTANEOUS at 17:44
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
## 2020-02-04 NOTE — PROGRESS NOTES
Progress Note Cha Sanon 1960, 60 y o  male MRN: [id number]    Unit/Bed#: 2 [place] [address] Encounter: [id number]    Primary Care Provider: Mary Hooper MD   Date and time admitted to hospital: 2/2/2020  6:55 PM        * Acute on chronic respiratory failure with hypoxia ([facility] [address] )  Assessment & Plan  · Likely secondary to right lower lobe pneumonia, left lower lobe atelectasis, mild COPD exacerbation  · Patient uses oxygen 2 L via nasal cannula with activity at home chronically  Uses BiPAP 12/5 with oxygen 2 L at HS for SHAVONNE  · Currently requiring 5 L nasal cannula and BiPAP at night  · Patient currently is on day 3 of ceftriaxone; discontinued azithromycin as urine antigens negative  · Appreciate pulmonology input    Severe sepsis [facility])  Assessment & Plan  POA  As evidenced by fever 102 1, tachycardia, leukocytosis, with source of infection right lower lobe pneumonia  Lactic acid 2 7  Normalized after receiving normal saline 1000 mL in ED  Procalcitonin 4 89, decreased to 3 00    Blood cultures drawn; one of two sets + for gram + cocci; suspect contamination  Second set is negative to date  · Antibiotic as above  · Repeat procalcitonin in am   · Repeat cbc in am        COPD (chronic obstructive pulmonary disease) ([facility] )  Assessment & Plan  Patient was prescribed prednisone taper 30 -20 -10 mg PO daily for 3 days by his pulmonologist's office last week  Patient had 3 days left of Prednisone  · Will discontinue Prednisone as patient does not have wheezing on exam   · Continue Trelegy inhaler(substitute with Breo + atrovent neb)  · Appreciate pulmonology consultation and recommendations  Community acquired pneumonia  Assessment & Plan  Chest x-ray unremarkable  CT chest - Right basilar consolidation with air bronchograms  Left basilar relaxation atelectasis  Flu/RSV negative  Pro count 4 point  Treat as community-acquired pneumonia    · Continue Rocephin 2 gm IV daily (weight based), day #3, discontinued azithromycin as patient's urine antigens negative  · Continue Atrovent/Xopenex TID, Xopenex PRN, Mucinex  · MRSA screen negative  · Blood cultures as noted above  · Patient needs aggressive mucus clearance with acapella valve + IS 10x/hr while awake    · Pulmonary following, appreciate input  · Procalcitonin trending down  · Repeat CBC with diff, procalcitonin in am    Hyponatremia  Assessment & Plan  Sodium 130  Acute on chronic  Baseline sodium appears to be 134-135, likely due to psych medications + PNA  Patient received NS 1000 mL in ED  Na improved from 130 -> 131->134  Monitor BMP in am     Benign essential hypertension  Assessment & Plan  · Blood pressure reviewed and acceptable, 117/79  · Continue losartan, metoprolol with holding parameter    Chronic a-fib  Assessment & Plan  Patient presented with uncontrolled AFib  EKG AFib, heart rate 125  · Heart rate improved with home medications, continue Toprol  mg daily and digoxin 250 mcg daily  · Tele - controlled AFib  Will discontinue  · Monitor electrolytes, keep potassium > 4 and magnesium > 2  · Magnesium 1 9, will give oral Mag oxide for 2 doses  · Continue Eliquis    CAD (coronary artery disease)  Assessment & Plan  Nuclear stress test 1/2020 was normal   Normal LV systolic function on nuclear stress test   · Continue Metoprolol, Aspirin, Lipitor    Stage 2 chronic kidney disease  Assessment & Plan  Baseline creatinine 0 9-1 2s  Creatinine stable  Monitor  Type 2 diabetes mellitus with complication, with long-term current use of insulin [facility])  Assessment & Plan  Lab Results   Component Value Date    HGBA1C 8 3 (H) 01/06/2020       Recent Labs     02/03/20  0016 02/03/20  0709 02/03/20  1105   POCGLU 326* 472* 246*       Blood Sugar Average: Last 72 hrs:  (P) 348   A1c 8 3, suboptimal control of diabetes  Continue Lantus 55 units HS, continue mealtime insulin 20 units TID, continue SSI  Hold metformin    Steroid induced hyperglycemia  Per Pulmonary  Okay to discontinue prednisone as patient is not wheezing  Anticipate improvement in blood sugar with discontinuation of prednisone  Diabetic diet  Patient follows Dr Dolly Gudino as outpatient    Diabetic neuropathy [facility])  Assessment & Plan  Lab Results   Component Value Date    HGBA1C 8 3 (H) 01/06/2020       Recent Labs     02/03/20  0016 02/03/20  0709 02/03/20  1105   POCGLU 326* 472* 246*       Blood Sugar Average: Last 72 hrs:  (P) 348   · Continue Lyrica    Elevated brain natriuretic peptide (BNP) level  Assessment & Plan  ProBNP 820  Chest x-ray no pulmonary edema  CT chest - Small right and trace left pleural effusions  Cardiomegaly and left atrial enlargement  Trace pericardial effusion  2D echo in 8/2019 - markedly reduced EF at 40-45%,mild diffuse hypokinesis,wall thickness was mildly increased  Nuclear stress test in 1/2020 showed normal LV EF  Pedal edema on exam, left worse than right, and trace leg edema bilateral   · Repeat 2D echo  · Daily weight and I&Os  · In past 24 hours patient is -600 cc  · Continue to monitor     SHAVONNE treated with BiPAP  Assessment & Plan  Continue BiPAP 12/5 with oxygen, titrate to keep sats above 92%  Uses O2 2L with BiPAP at home  Diarrhea  Assessment & Plan  Patient reports diarrhea uncontrollably at [facility] for past couple of days at home  Denies antibiotic use in past 3 months  · Patient reports he has not had any bowel movements for the past 3 days  · Will discontinue C diff order  · Probiotic BID    Hyperlipidemia  Assessment & Plan  Continue Lipitor  Therapeutic lifestyle changes  Cardiac diet  Chronic reflux esophagitis  Assessment & Plan  Continue PPI    Obesity (BMI 30-39  9)  Assessment & Plan  Body mass index is 38 05 kg/m²    Diet and lifestyle modification    Bipolar affective disorder ([facility] [address] )  Assessment & Plan  · Continue Xanax, BuSpar, Seroquel, Zoloft  · Monitor        VTE Pharmacologic Prophylaxis:   Pharmacologic: Apixaban (Eliquis)  Mechanical VTE Prophylaxis in Place: Yes    Patient Centered Rounds: I have performed bedside rounds with nursing staff today  Discussions with Specialists or Other Care Team Provider: Pulmonary and multidisciplinary team      Education and Discussions with Family / Patient:  I spoke with the patient at the bedside, answered all questions to the best of abilities  Time Spent for Care: 30 minutes  More than 50% of total time spent on counseling and coordination of care as described above  Current Length of Stay: 2 day(s)    Current Patient Status: Inpatient   Certification Statement: The patient will continue to require additional inpatient hospital stay due to Continued IV antibiotics, nebulizer treatments, Acapella valve, Pulmonary consultation  Discharge Plan:  Most likely stable for discharge the next 24-48 hours pending patient's progress  Code Status: Level 1 - Full Code      Subjective:   Patient seen sitting in chair, had just finished having breakfast   Reports that his appetite is improved  Reports that he is still coughing and occasionally does get right rib pain  Indicated that he is expectorating, and that some of the sputum is blood-tinged  He denies headache, dizziness, abdominal pain, nausea, vomiting diarrhea  Objective:     Vitals:   Temp (24hrs), Av 9 °F (36 6 °C), Min:97 3 °F (36 3 °C), Max:98 5 °F (36 9 °C)    Temp:  [97 3 °F (36 3 °C)-98 5 °F (36 9 °C)] 98 5 °F (36 9 °C)  HR:  [72-95] 72  Resp:  [16-18] 18  BP: (117-139)/(70-96) 117/79  SpO2:  [93 %-99 %] 95 %  Body mass index is 37 96 kg/m²  Input and Output Summary (last 24 hours): Intake/Output Summary (Last 24 hours) at 2020 1614  Last data filed at 2020 1301  Gross per 24 hour   Intake 570 ml   Output 500 ml   Net 70 ml       Physical Exam:     Physical Exam   Constitutional: He is oriented to person, place, and time  He appears well-nourished  No distress     HENT:   Head: Normocephalic  Eyes: Pupils are equal, round, and reactive to light  Conjunctivae and EOM are normal    Neck: Normal range of motion  Cardiovascular: Normal rate and regular rhythm  Pulmonary/Chest:   Patient diminished bilaterally; is using acapella valve and expectorating some blood tinged sputum  Abdominal: Soft  Bowel sounds are normal  There is no tenderness  Abdomen obese  Genitourinary:   Genitourinary Comments: Voiding spontaneously  Musculoskeletal: Normal range of motion  He exhibits no edema  Neurological: He is alert and oriented to person, place, and time  Skin: Skin is warm and dry  Capillary refill takes less than 2 seconds  Psychiatric: He has a normal mood and affect  His behavior is normal  Judgment and thought content normal    Nursing note and vitals reviewed  Additional Data:     Labs:    Results from last 7 days   Lab Units 02/04/20  0522   WBC Thousand/uL 10 51*   HEMOGLOBIN g/dL 13 2   HEMATOCRIT % 39 7   PLATELETS Thousands/uL 193   NEUTROS PCT % 62   LYMPHS PCT % 29   MONOS PCT % 6   EOS PCT % 1     Results from last 7 days   Lab Units 02/04/20  0522  02/02/20  1904   POTASSIUM mmol/L 3 4*   < > 4 0   CHLORIDE mmol/L 98*   < > 92*   CO2 mmol/L 30   < > 25   BUN mg/dL 23   < > 24   CREATININE mg/dL 0 89   < > 1 25   CALCIUM mg/dL 9 7   < > 9 4   ALK PHOS U/L  --   --  70   ALT U/L  --   --  23   AST U/L  --   --  20    < > = values in this interval not displayed  Results from last 7 days   Lab Units 02/02/20  1904   INR  1 08       * I Have Reviewed All Lab Data Listed Above  * Additional Pertinent Lab Tests Reviewed: All Labs Within Last 24 Hours Reviewed    Imaging:    Imaging Reports Reviewed Today Include:  Echocardiogram  Imaging Personally Reviewed by Myself Includes:  None      Recent Cultures (last 7 days):     Results from last 7 days   Lab Units 02/03/20  1046 02/03/20  0619 02/02/20  1908   BLOOD CULTURE   --   --  Staphylococcus coagulase negative* No Growth at 24 hrs     SPUTUM CULTURE  Culture too young- will reincubate  --   --    GRAM STAIN RESULT  1+ Polys  1+ Mononuclear Cells  No organisms seen  --  Gram positive cocci in clusters*   LEGIONELLA URINARY ANTIGEN   --  Negative  --        Last 24 Hours Medication List:     Current Facility-Administered Medications:  acetaminophen 650 mg Oral Q6H PRN Breonna Batista, MANAS    acetylcysteine 3 mL Nebulization TID DAMIR NobleC    ALPRAZolam 2 mg Oral HS Cuaustyn Batista, CRJALIL    apixaban 5 mg Oral BID Breonna Batista, CRNP    aspirin 81 mg Oral QAM Breonna Batista, CRNP    atorvastatin 40 mg Oral Daily With Nelson & Tarun, MANAS    busPIRone 15 mg Oral BID Breonna Batista, MANAS    cefTRIAXone 2,000 mg Intravenous Q24H Breonna Batista, MANAS Last Rate: Stopped (02/03/20 2051)   digoxin 250 mcg Oral Daily MANAS Yoder    ergocalciferol 50,000 Units Oral Weekly Cuaustyn Batista, CRJALIL    fish oil 2,000 mg Oral BID Breonna Batista, MANAS    fluticasone-vilanterol 1 puff Inhalation Daily Breonna Batista, MANAS    guaiFENesin 1,200 mg Oral BID Shen Silva PA-C    insulin glargine 55 Units Subcutaneous HS Breonna Batista, MANAS    insulin lispro 1-5 Units Subcutaneous TID AC Breonna Batista, MANAS    insulin lispro 1-5 Units Subcutaneous HS Breonna Batista, CRJALIL    insulin lispro 20 Units Subcutaneous TID With Meals Breonna Batista, MANAS    ipratropium 0 5 mg Nebulization TID Breonna Batista, MANAS    levalbuterol 0 63 mg Nebulization Q4H PRN Breonna Batista, MANAS    levalbuterol 1 25 mg Nebulization TID Breonna Batista, MANAS    lidocaine 1 patch Topical Daily Breonna Batista, MANAS    losartan 50 mg Oral BID Breonna Batista, MANAS    magnesium oxide 400 mg Oral BID Alisson Salazar, CRJALIL    metoprolol succinate 200 mg Oral Daily Cuiyin Yurik, CRNP    ondansetron 4 mg Intravenous Q6H PRN MANAS Yoder    pantoprazole 40 mg Oral HS MANAS Yoder    pregabalin 50 mg Oral TID MANAS Yoder    QUEtiapine 200 mg Oral QAM MANAS Machuca QUEtiapine 400 mg Oral HS MANAS Yoder    saccharomyces boulardii 250 mg Oral BID MANAS Yoder    sertraline 50 mg Oral HS MANAS Yoder    sodium chloride (PF) 3 mL Intravenous PRN MANAS Spence         Today, Patient Was Seen By: MANAS Kim    ** Please Note: Dictation voice to text software may have been used in the creation of this document   **

## 2020-02-04 NOTE — ASSESSMENT & PLAN NOTE
Historically uses BiPAP 12/5 prior to this hospitalization  Adjusted to auto BiPAP > 18/11 as of yesterday for both his BiPAP and his home BiPAP  Continues to sleep well on Bipap  Will trend compliance, usage,and symptomatology into the outpatient setting
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
## 2020-02-04 NOTE — PHYSICAL THERAPY NOTE
PT TREATMENT     02/04/20 [facility]   Pain Assessment   Pain Assessment No/denies pain   Restrictions/Precautions   Other Precautions Chair Alarm; Bed Alarm; Fall Risk;O2   General   Chart Reviewed Yes   Family/Caregiver Present No   Cognition   Arousal/Participation Cooperative   Subjective   Subjective patient reports feeling fatigued all the time   Transfers   Sit to Stand 5  Supervision   Stand to Sit 5  Supervision   Toilet transfer 5  Supervision   Ambulation/Elevation   Gait Assistance   (supervision to min assist at times)   Additional items Assist x 1;Verbal cues; Tactile cues   Assistive Device Rolling walker   Distance 30 feet with change in direction, slow gait speed and requiring standing rest periods at times with 3 liters O2   Exercises   Glute Sets Sitting;10 reps   Hip Flexion Sitting;10 reps;Bilateral   Knee AROM Long Arc Quad Sitting;10 reps;Bilateral   Ankle Pumps Sitting;10 reps;Bilateral   Balance training  sidestepping and backward walking completed for balance and strengthening as well as sitting weight shifting and LE exercise without lumbar support for trunk strengthening   Assessment   Assessment patient cooperative and motivated and will benefit from continued PT with progression as tolerated  Plan   Treatment/Interventions ADL retraining;Functional transfer training;LE strengthening/ROM; Therapeutic exercise; Endurance training;Patient/family training;Equipment eval/education; Bed mobility;Gait training; Compensatory technique education   PT Frequency 5x/wk   Recommendation   Recommendation Home PT   Licensure   [place] License Number  Nic Hickey PT [id number]

## 2020-02-05 ENCOUNTER — APPOINTMENT (INPATIENT)
Dept: RADIOLOGY | Facility: HOSPITAL | Age: 60
DRG: 871 | End: 2020-02-05
Payer: MEDICARE

## 2020-02-05 LAB
ANION GAP SERPL CALCULATED.3IONS-SCNC: 10 MMOL/L (ref 4–13)
BACTERIA BLD CULT: ABNORMAL
BACTERIA SPT RESP CULT: NORMAL
BACTERIA SPT RESP CULT: NORMAL
BASOPHILS # BLD MANUAL: 0 THOUSAND/UL (ref 0–0.1)
BASOPHILS NFR MAR MANUAL: 0 % (ref 0–1)
BUN SERPL-MCNC: 19 MG/DL (ref 5–25)
CALCIUM SERPL-MCNC: 9.2 MG/DL (ref 8.3–10.1)
CHLORIDE SERPL-SCNC: 99 MMOL/L (ref 100–108)
CO2 SERPL-SCNC: 27 MMOL/L (ref 21–32)
CREAT SERPL-MCNC: 0.78 MG/DL (ref 0.6–1.3)
EOSINOPHIL # BLD MANUAL: 0.1 THOUSAND/UL (ref 0–0.4)
EOSINOPHIL NFR BLD MANUAL: 1 % (ref 0–6)
ERYTHROCYTE [DISTWIDTH] IN BLOOD BY AUTOMATED COUNT: 13.1 % (ref 11.6–15.1)
GFR SERPL CREATININE-BSD FRML MDRD: 98 ML/MIN/1.73SQ M
GLUCOSE SERPL-MCNC: 106 MG/DL (ref 65–140)
GLUCOSE SERPL-MCNC: 199 MG/DL (ref 65–140)
GLUCOSE SERPL-MCNC: 200 MG/DL (ref 65–140)
GLUCOSE SERPL-MCNC: 223 MG/DL (ref 65–140)
GLUCOSE SERPL-MCNC: 294 MG/DL (ref 65–140)
GRAM STN SPEC: ABNORMAL
GRAM STN SPEC: NORMAL
HCT VFR BLD AUTO: 36.6 % (ref 36.5–49.3)
HGB BLD-MCNC: 12 G/DL (ref 12–17)
LYMPHOCYTES # BLD AUTO: 2.32 THOUSAND/UL (ref 0.6–4.47)
LYMPHOCYTES # BLD AUTO: 24 % (ref 14–44)
MAGNESIUM SERPL-MCNC: 1.9 MG/DL (ref 1.6–2.6)
MCH RBC QN AUTO: 31.1 PG (ref 26.8–34.3)
MCHC RBC AUTO-ENTMCNC: 32.8 G/DL (ref 31.4–37.4)
MCV RBC AUTO: 95 FL (ref 82–98)
MONOCYTES # BLD AUTO: 0.97 THOUSAND/UL (ref 0–1.22)
MONOCYTES NFR BLD: 10 % (ref 4–12)
NEUTROPHILS # BLD MANUAL: 6.29 THOUSAND/UL (ref 1.85–7.62)
NEUTS BAND NFR BLD MANUAL: 11 % (ref 0–8)
NEUTS SEG NFR BLD AUTO: 54 % (ref 43–75)
NRBC BLD AUTO-RTO: 0 /100 WBCS
PLATELET # BLD AUTO: 215 THOUSANDS/UL (ref 149–390)
PLATELET BLD QL SMEAR: ADEQUATE
PMV BLD AUTO: 9.7 FL (ref 8.9–12.7)
POTASSIUM SERPL-SCNC: 3.5 MMOL/L (ref 3.5–5.3)
RBC # BLD AUTO: 3.86 MILLION/UL (ref 3.88–5.62)
RBC MORPH BLD: NORMAL
SODIUM SERPL-SCNC: 136 MMOL/L (ref 136–145)
TOTAL CELLS COUNTED SPEC: 100
WBC # BLD AUTO: 9.68 THOUSAND/UL (ref 4.31–10.16)

## 2020-02-05 PROCEDURE — 94668 MNPJ CHEST WALL SBSQ: CPT

## 2020-02-05 PROCEDURE — 99232 SBSQ HOSP IP/OBS MODERATE 35: CPT | Performed by: NURSE PRACTITIONER

## 2020-02-05 PROCEDURE — 83735 ASSAY OF MAGNESIUM: CPT | Performed by: NURSE PRACTITIONER

## 2020-02-05 PROCEDURE — 82948 REAGENT STRIP/BLOOD GLUCOSE: CPT

## 2020-02-05 PROCEDURE — 99231 SBSQ HOSP IP/OBS SF/LOW 25: CPT | Performed by: PHYSICIAN ASSISTANT

## 2020-02-05 PROCEDURE — 94660 CPAP INITIATION&MGMT: CPT

## 2020-02-05 PROCEDURE — NS001 PR NO SIGNATURE OR ATTESTATION: Performed by: SURGERY

## 2020-02-05 PROCEDURE — 94640 AIRWAY INHALATION TREATMENT: CPT

## 2020-02-05 PROCEDURE — 80048 BASIC METABOLIC PNL TOTAL CA: CPT | Performed by: NURSE PRACTITIONER

## 2020-02-05 PROCEDURE — 71046 X-RAY EXAM CHEST 2 VIEWS: CPT

## 2020-02-05 PROCEDURE — 85007 BL SMEAR W/DIFF WBC COUNT: CPT | Performed by: NURSE PRACTITIONER

## 2020-02-05 PROCEDURE — 85027 COMPLETE CBC AUTOMATED: CPT | Performed by: NURSE PRACTITIONER

## 2020-02-05 PROCEDURE — 94760 N-INVAS EAR/PLS OXIMETRY 1: CPT

## 2020-02-05 RX ORDER — INSULIN GLARGINE 100 [IU]/ML
40 INJECTION, SOLUTION SUBCUTANEOUS
Status: DISCONTINUED | OUTPATIENT
Start: 2020-02-05 | End: 2020-02-06

## 2020-02-05 RX ADMIN — PREGABALIN 50 MG: 50 CAPSULE ORAL at 16:43

## 2020-02-05 RX ADMIN — CEFTRIAXONE 2000 MG: 2 INJECTION, SOLUTION INTRAVENOUS at 20:46

## 2020-02-05 RX ADMIN — Medication 2000 MG: at 17:21

## 2020-02-05 RX ADMIN — INSULIN GLARGINE 40 UNITS: 100 INJECTION, SOLUTION SUBCUTANEOUS at 21:36

## 2020-02-05 RX ADMIN — PREGABALIN 50 MG: 50 CAPSULE ORAL at 20:46

## 2020-02-05 RX ADMIN — BUSPIRONE HYDROCHLORIDE 15 MG: 10 TABLET ORAL at 17:21

## 2020-02-05 RX ADMIN — PREGABALIN 50 MG: 50 CAPSULE ORAL at 08:12

## 2020-02-05 RX ADMIN — INSULIN LISPRO 2 UNITS: 100 INJECTION, SOLUTION INTRAVENOUS; SUBCUTANEOUS at 12:13

## 2020-02-05 RX ADMIN — INSULIN LISPRO 20 UNITS: 100 INJECTION, SOLUTION INTRAVENOUS; SUBCUTANEOUS at 08:26

## 2020-02-05 RX ADMIN — IPRATROPIUM BROMIDE 0.5 MG: 0.5 SOLUTION RESPIRATORY (INHALATION) at 07:40

## 2020-02-05 RX ADMIN — Medication 2000 MG: at 08:12

## 2020-02-05 RX ADMIN — LOSARTAN POTASSIUM 50 MG: 50 TABLET, FILM COATED ORAL at 08:12

## 2020-02-05 RX ADMIN — LEVALBUTEROL HYDROCHLORIDE 1.25 MG: 1.25 SOLUTION, CONCENTRATE RESPIRATORY (INHALATION) at 14:05

## 2020-02-05 RX ADMIN — BUSPIRONE HYDROCHLORIDE 15 MG: 10 TABLET ORAL at 08:12

## 2020-02-05 RX ADMIN — ACETYLCYSTEINE 600 MG: 200 SOLUTION ORAL; RESPIRATORY (INHALATION) at 14:05

## 2020-02-05 RX ADMIN — Medication 250 MG: at 08:13

## 2020-02-05 RX ADMIN — APIXABAN 5 MG: 5 TABLET, FILM COATED ORAL at 17:21

## 2020-02-05 RX ADMIN — DIGOXIN 250 MCG: 125 TABLET ORAL at 08:12

## 2020-02-05 RX ADMIN — LEVALBUTEROL HYDROCHLORIDE 1.25 MG: 1.25 SOLUTION, CONCENTRATE RESPIRATORY (INHALATION) at 07:40

## 2020-02-05 RX ADMIN — METOPROLOL SUCCINATE 200 MG: 100 TABLET, EXTENDED RELEASE ORAL at 08:12

## 2020-02-05 RX ADMIN — QUETIAPINE FUMARATE 400 MG: 100 TABLET ORAL at 21:30

## 2020-02-05 RX ADMIN — GUAIFENESIN 1200 MG: 600 TABLET, EXTENDED RELEASE ORAL at 08:12

## 2020-02-05 RX ADMIN — MAGNESIUM OXIDE TAB 400 MG (241.3 MG ELEMENTAL MG) 400 MG: 400 (241.3 MG) TAB at 08:12

## 2020-02-05 RX ADMIN — IPRATROPIUM BROMIDE 0.5 MG: 0.5 SOLUTION RESPIRATORY (INHALATION) at 14:05

## 2020-02-05 RX ADMIN — INSULIN LISPRO 1 UNITS: 100 INJECTION, SOLUTION INTRAVENOUS; SUBCUTANEOUS at 08:25

## 2020-02-05 RX ADMIN — ACETYLCYSTEINE 600 MG: 200 SOLUTION ORAL; RESPIRATORY (INHALATION) at 19:59

## 2020-02-05 RX ADMIN — INSULIN LISPRO 20 UNITS: 100 INJECTION, SOLUTION INTRAVENOUS; SUBCUTANEOUS at 12:13

## 2020-02-05 RX ADMIN — QUETIAPINE FUMARATE 200 MG: 200 TABLET, EXTENDED RELEASE ORAL at 08:25

## 2020-02-05 RX ADMIN — SERTRALINE HYDROCHLORIDE 50 MG: 50 TABLET ORAL at 21:30

## 2020-02-05 RX ADMIN — ACETYLCYSTEINE 600 MG: 200 SOLUTION ORAL; RESPIRATORY (INHALATION) at 07:40

## 2020-02-05 RX ADMIN — INSULIN LISPRO 20 UNITS: 100 INJECTION, SOLUTION INTRAVENOUS; SUBCUTANEOUS at 16:42

## 2020-02-05 RX ADMIN — PANTOPRAZOLE SODIUM 40 MG: 40 TABLET, DELAYED RELEASE ORAL at 21:30

## 2020-02-05 RX ADMIN — Medication 250 MG: at 17:20

## 2020-02-05 RX ADMIN — FLUTICASONE FUROATE AND VILANTEROL TRIFENATATE 1 PUFF: 100; 25 POWDER RESPIRATORY (INHALATION) at 08:25

## 2020-02-05 RX ADMIN — APIXABAN 5 MG: 5 TABLET, FILM COATED ORAL at 08:12

## 2020-02-05 RX ADMIN — ACETAMINOPHEN 650 MG: 325 TABLET, FILM COATED ORAL at 20:45

## 2020-02-05 RX ADMIN — INSULIN LISPRO 1 UNITS: 100 INJECTION, SOLUTION INTRAVENOUS; SUBCUTANEOUS at 16:42

## 2020-02-05 RX ADMIN — ALPRAZOLAM 2 MG: 0.5 TABLET ORAL at 21:30

## 2020-02-05 RX ADMIN — LEVALBUTEROL HYDROCHLORIDE 1.25 MG: 1.25 SOLUTION, CONCENTRATE RESPIRATORY (INHALATION) at 19:59

## 2020-02-05 RX ADMIN — ATORVASTATIN CALCIUM 40 MG: 40 TABLET, FILM COATED ORAL at 16:43

## 2020-02-05 RX ADMIN — LIDOCAINE 1 PATCH: 50 PATCH TOPICAL at 05:16

## 2020-02-05 RX ADMIN — GUAIFENESIN 1200 MG: 600 TABLET, EXTENDED RELEASE ORAL at 17:21

## 2020-02-05 RX ADMIN — IPRATROPIUM BROMIDE 0.5 MG: 0.5 SOLUTION RESPIRATORY (INHALATION) at 19:59

## 2020-02-05 RX ADMIN — ASPIRIN 81 MG 81 MG: 81 TABLET ORAL at 08:12

## 2020-02-05 NOTE — ASSESSMENT & PLAN NOTE
Chest x-ray unremarkable  CT chest - Right basilar consolidation with air bronchograms  Left basilar relaxation atelectasis  Flu/RSV negative  Pro count 4 point  Treat as community-acquired pneumonia  · Continue Rocephin 2 gm IV daily (weight based), day #4, discontinued azithromycin as patient's urine antigens negative    · Continue Atrovent/Xopenex TID, Xopenex PRN, Mucinex  · MRSA screen negative  · Blood cultures as noted above  · Patient needs aggressive mucus clearance with acapella valve + IS 10x/hr while awake    · Pulmonary following, appreciate input  · Procalcitonin trending down  · Repeat CBC with diff, procalcitonin in am

## 2020-02-05 NOTE — ASSESSMENT & PLAN NOTE
Sodium 130  Acute on chronic  Baseline sodium appears to be 134-135, likely due to psych medications + PNA    Patient received NS 1000 mL in ED  Na improved from 130 -> 131->134->136  Monitor BMP in am
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
## 2020-02-05 NOTE — PLAN OF CARE
Problem: Potential for Falls  Goal: Patient will remain free of falls  Description  INTERVENTIONS:  - Assess patient frequently for physical needs  -  Identify cognitive and physical deficits and behaviors that affect risk of falls    -  [place] fall precautions as indicated by assessment   - Educate patient/family on patient safety including physical limitations  - Instruct patient to call for assistance with activity based on assessment  - Modify environment to reduce risk of injury  - Consider OT/PT consult to assist with strengthening/mobility  Outcome: Progressing     Problem: Prexisting or High Potential for Compromised Skin Integrity  Goal: Skin integrity is maintained or improved  Description  INTERVENTIONS:  - Identify patients at risk for skin breakdown  - Assess and monitor skin integrity  - Assess and monitor nutrition and hydration status  - Monitor labs   - Assist with mobility/ambulation  - Relieve pressure over bony prominences  - Avoid friction and shearing  - Provide appropriate hygiene as needed including keeping skin clean and dry  - Evaluate need for skin moisturizer/barrier cream  - Collaborate with interdisciplinary team   - Patient/family teaching  - Consider wound care consult    Outcome: Progressing     Problem: RESPIRATORY - ADULT  Goal: Achieves optimal ventilation and oxygenation  Description  INTERVENTIONS:  - Assess for changes in respiratory status  - Assess for changes in mentation and behavior  - Position to facilitate oxygenation and minimize respiratory effort  - Oxygen administered by appropriate delivery if ordered  Intiate bi-pap  Intiate bronchodilator therapy  Q 6 hours  - Encourage broncho-pulmonary hygiene including cough, deep breathe, Incentive Spirometry  - Assess the need for suctioning and aspirate as needed  - Assess and instruct to report SOB or any respiratory difficulty  - Respiratory Therapy support as indicated    Outcome: Progressing

## 2020-02-05 NOTE — ASSESSMENT & PLAN NOTE
Lab Results   Component Value Date    HGBA1C 8 3 (H) 01/06/2020       Recent Labs     02/04/20  1111 02/04/20  1612 02/04/20 2050 02/05/20  0707   POCGLU 254* 203* 169* 223*       Blood Sugar Average: Last 72 hrs:  (P) 267 5   A1c 8 3, suboptimal control of diabetes  Continue Lantus 55 units HS, continue mealtime insulin 20 units TID, continue SSI  Hold metformin  Steroid induced hyperglycemia  Blood sugars improved off prednisone  Diabetic diet  Patient follows Dr Reji Juárez as outpatient  Patient would benefit from weight loss and therapeutic lifestyle changes

## 2020-02-05 NOTE — PROGRESS NOTES
Progress Note Shiva Sandoval 1960, 60 y o  male MRN: [id number]    Unit/Bed#: [address] [place] [address] Encounter: [id number]    Primary Care Provider: Flakito Briggs MD   Date and time admitted to hospital: 2/2/2020  6:55 PM        * Acute on chronic respiratory failure with hypoxia ([facility] )  Assessment & Plan  · Likely secondary to right lower lobe pneumonia, left lower lobe atelectasis, mild COPD exacerbation  · Patient uses oxygen 2 L via nasal cannula with activity at home chronically  Uses BiPAP 12/5 with oxygen 2 L at HS for SHAVONNE  · Currently requiring 2 L nasal cannula and BiPAP at night  · Patient currently is on day 4 of ceftriaxone; discontinued azithromycin as urine antigens negative  · Appreciate pulmonology input  · Patient reports he is feeling improved; discussed potential discharge in am    · Reports he has home O2 and nebulizer at home already  · Encouraged patient to continue using his incentive spirometer and Acapella  Severe sepsis ([facility] )  Assessment & Plan  POA  As evidenced by fever 102 1, tachycardia, leukocytosis, with source of infection right lower lobe pneumonia  Lactic acid 2 7  Normalized after receiving normal saline 1000 mL in ED  Procalcitonin 4 89, decreased to 3 00    Blood cultures drawn; one of two sets + for gram + cocci; suspect contamination  Second set is negative to date  Temperature 99 9°  · Antibiotic as above  · Repeat procalcitonin in am   · Repeat cbc in am        COPD (chronic obstructive pulmonary disease) ([facility] )  Assessment & Plan  Patient was prescribed prednisone taper 30 -20 -10 mg PO daily for 3 days by his pulmonologist's office last week  Patient had 3 days left of Prednisone  · Patient had been placed on prednisone initially, however he is not wheezing  Prednisone discontinued  · Continue Trelegy inhaler(substitute with Breo + atrovent neb)  · Appreciate pulmonology consultation and recommendations      Community acquired pneumonia  Assessment & Plan  Chest x-ray unremarkable  CT chest - Right basilar consolidation with air bronchograms  Left basilar relaxation atelectasis  Flu/RSV negative  Pro count 4 point  Treat as community-acquired pneumonia  · Continue Rocephin 2 gm IV daily (weight based), day #4, discontinued azithromycin as patient's urine antigens negative  · Continue Atrovent/Xopenex TID, Xopenex PRN, Mucinex  · MRSA screen negative  · Blood cultures as noted above  · Patient needs aggressive mucus clearance with acapella valve + IS 10x/hr while awake    · Pulmonary following, appreciate input  · Procalcitonin trending down  · Repeat CBC with diff, procalcitonin in am    Hyponatremia  Assessment & Plan  Sodium 130  Acute on chronic  Baseline sodium appears to be 134-135, likely due to psych medications + PNA  Patient received NS 1000 mL in ED  Na improved from 130 -> 131->134->136  Monitor BMP in am     Benign essential hypertension  Assessment & Plan  · Blood pressure reviewed and acceptable, 133/80  · Continue losartan, metoprolol with holding parameter    Chronic a-fib  Assessment & Plan  Patient presented with uncontrolled AFib  EKG AFib, heart rate 125  · Heart rate improved with home medications, continue Toprol  mg daily and digoxin 250 mcg daily  · Tele - controlled AFib  Will discontinue  · Monitor electrolytes, keep potassium > 4 and magnesium > 2  · Magnesium 1 9, will give oral Mag oxide for 2 doses  · Continue Eliquis    CAD (coronary artery disease)  Assessment & Plan  Nuclear stress test 1/2020 was normal   Normal LV systolic function on nuclear stress test   · Continue Metoprolol, Aspirin, Lipitor    Stage 2 chronic kidney disease  Assessment & Plan  Baseline creatinine 0 9-1 2s  Creatinine stable 0 78  Monitor      Type 2 diabetes mellitus with complication, with long-term current use of insulin [facility])  Assessment & Plan  Lab Results   Component Value Date    HGBA1C 8 3 (H) 01/06/2020 Recent Labs     02/04/20  1111 02/04/20  1612 02/04/20 2050 02/05/20  0707   POCGLU 254* 203* 169* 223*       Blood Sugar Average: Last 72 hrs:  (P) 267 5   A1c 8 3, suboptimal control of diabetes  Continue Lantus 55 units HS, continue mealtime insulin 20 units TID, continue SSI  Hold metformin  Steroid induced hyperglycemia  Blood sugars improved off prednisone  Diabetic diet  Patient follows Dr Yareli Warner as outpatient  Patient would benefit from weight loss and therapeutic lifestyle changes  Diabetic neuropathy [facility])  Assessment & Plan  Lab Results   Component Value Date    HGBA1C 8 3 (H) 01/06/2020       Recent Labs     02/04/20  1111 02/04/20  1612 02/04/20 2050 02/05/20  0707   POCGLU 254* 203* 169* 223*       Blood Sugar Average: Last 72 hrs:  (P) 267 5   · Continue Lyrica    Elevated brain natriuretic peptide (BNP) level  Assessment & Plan  ProBNP 820  Chest x-ray no pulmonary edema  CT chest - Small right and trace left pleural effusions  Cardiomegaly and left atrial enlargement  Trace pericardial effusion  2D echo in 8/2019 - markedly reduced EF at 40-45%,mild diffuse hypokinesis,wall thickness was mildly increased  Nuclear stress test in 1/2020 showed normal LV EF  Pedal edema on exam, left worse than right, and trace leg edema bilateral   · Repeat 2D echo shows EF of 40-45%, no regional wall motion abnormalities  Mild diffuse hypokinesis, mild concentric hypertrophy and mild mitral valve regurgitation  No significant changes from previous study performed August 2019 as per Cardiology report  · Daily weight and I&Os  · Continue to monitor     SHAVONNE treated with BiPAP  Assessment & Plan  Continue BiPAP 12/5 with oxygen, titrate to keep sats above 92%  Uses O2 2L with BiPAP at home  Diarrhea  Assessment & Plan  Patient reported diarrhea uncontrollably at [facility] for past couple of days at home prior to admission  Denies antibiotic use in past 3 months    · Patient reports he had a medium sized formed BM yesterday  · Continue probiotic BID    Hyperlipidemia  Assessment & Plan  Continue Lipitor  Therapeutic lifestyle changes  Cardiac diet  Chronic reflux esophagitis  Assessment & Plan  Continue PPI    Obesity (BMI 30-39  9)  Assessment & Plan  Body mass index is 38 88 kg/m²  Diet and lifestyle modification    Bipolar affective disorder ([facility] )  Assessment & Plan  · Continue Xanax, BuSpar, Seroquel, Zoloft  · Supportive care  VTE Pharmacologic Prophylaxis:   Pharmacologic: Apixaban (Eliquis)  Mechanical VTE Prophylaxis in Place: Yes    Patient Centered Rounds: I have performed bedside rounds with nursing staff today  Discussions with Specialists or Other Care Team Provider: Pulmonary and multidisciplinary team     Education and Discussions with Family / Patient: I spoke with the patient at the bedside; I have answered all questions to the best of my ability  Time Spent for Care: 30 minutes  More than 50% of total time spent on counseling and coordination of care as described above  Current Length of Stay: 3 day(s)    Current Patient Status: Inpatient   Certification Statement: The patient will continue to require additional inpatient hospital stay due to IV antibiotics, repeat labs, continued nebs  Discharge Plan: Patient most likely stable for discharge in next 24 hours; discussed with pulmonary and patient  Code Status: Level 1 - Full Code      Subjective:   Patient seen sitting comfortably in chair, denies headache, dizziness, shortness of breath, chest pain, nausea, vomiting or diarrhea  Reports he is still coughing, but it has improved  Objective:     Vitals:   Temp (24hrs), Av 1 °F (37 3 °C), Min:98 5 °F (36 9 °C), Max:99 9 °F (37 7 °C)    Temp:  [98 5 °F (36 9 °C)-99 9 °F (37 7 °C)] 99 9 °F (37 7 °C)  HR:  [] 111  Resp:  [17-22] 17  BP: (117-144)/(79-97) 133/80  SpO2:  [91 %-97 %] 91 %  Body mass index is 38 88 kg/m²       Input and Output Summary (last 24 hours): Intake/Output Summary (Last 24 hours) at 2/5/2020 1026  Last data filed at 2/5/2020 0811  Gross per 24 hour   Intake 90 ml   Output 300 ml   Net -210 ml       Physical Exam:     Physical Exam   Constitutional: He is oriented to person, place, and time  He appears well-nourished  No distress  HENT:   Head: Normocephalic  Eyes: Pupils are equal, round, and reactive to light  Conjunctivae and EOM are normal    Neck: Normal range of motion  Cardiovascular: Normal rate  An irregularly irregular rhythm present  Murmur heard  Pulmonary/Chest: Effort normal    Diminished bilaterally  Abdominal: Soft  Bowel sounds are normal  He exhibits no distension  There is no tenderness  Genitourinary:   Genitourinary Comments: Voiding spontaneously  Musculoskeletal: Normal range of motion  He exhibits no edema  Neurological: He is alert and oriented to person, place, and time  Skin: Skin is warm and dry  Capillary refill takes less than 2 seconds  Dressing noted on left axilla, no drainage noted  Psychiatric: He has a normal mood and affect  His behavior is normal  Judgment and thought content normal    Nursing note and vitals reviewed  Additional Data:     Labs:    Results from last 7 days   Lab Units 02/05/20  0521 02/04/20  0522   WBC Thousand/uL 9 68 10 51*   HEMOGLOBIN g/dL 12 0 13 2   HEMATOCRIT % 36 6 39 7   PLATELETS Thousands/uL 215 193   NEUTROS PCT %  --  62   LYMPHS PCT %  --  29   LYMPHO PCT % 24  --    MONOS PCT %  --  6   MONO PCT % 10  --    EOS PCT % 1 1     Results from last 7 days   Lab Units 02/05/20  0521  02/02/20  1904   POTASSIUM mmol/L 3 5   < > 4 0   CHLORIDE mmol/L 99*   < > 92*   CO2 mmol/L 27   < > 25   BUN mg/dL 19   < > 24   CREATININE mg/dL 0 78   < > 1 25   CALCIUM mg/dL 9 2   < > 9 4   ALK PHOS U/L  --   --  70   ALT U/L  --   --  23   AST U/L  --   --  20    < > = values in this interval not displayed       Results from last 7 days   Lab Units 02/02/20  1904   INR  1 08       * I Have Reviewed All Lab Data Listed Above  * Additional Pertinent Lab Tests Reviewed: All Labs Within Last 24 Hours Reviewed    Imaging:    Imaging Reports Reviewed Today Include: echocardiogram    Imaging Personally Reviewed by Myself Includes:  None  Recent Cultures (last 7 days):     Results from last 7 days   Lab Units 02/03/20  1046 02/03/20  0619 02/02/20  1908   BLOOD CULTURE   --   --  Staphylococcus coagulase negative*  No Growth at 48 hrs  SPUTUM CULTURE  1+ Growth of   Commensal respiratory bala only; No significant growth of Staph aureus/MRSA or Pseudomonas aeruginosa    --   --    GRAM STAIN RESULT  1+ Polys  1+ Mononuclear Cells  No organisms seen  --  Gram positive cocci in clusters*   LEGIONELLA URINARY ANTIGEN   --  Negative  --        Last 24 Hours Medication List:     Current Facility-Administered Medications:  acetaminophen 650 mg Oral Q6H PRN MANAS Yoder    acetylcysteine 3 mL Nebulization TID Tay Flynn PA-C    ALPRAZolam 2 mg Oral HS MANAS Yoder    apixaban 5 mg Oral BID MANAS Yoder    aspirin 81 mg Oral QAM MANAS Yoder    atorvastatin 40 mg Oral Daily With MANAS Arnold    busPIRone 15 mg Oral BID MANAS Yoder    cefTRIAXone 2,000 mg Intravenous Q24H MANAS Yoder Last Rate: Stopped (02/04/20 2045)   digoxin 250 mcg Oral Daily MANAS Yoder    ergocalciferol 50,000 Units Oral Weekly MANAS Yoder    fish oil 2,000 mg Oral BID MANAS Yoder    fluticasone-vilanterol 1 puff Inhalation Daily MANAS Yoder    guaiFENesin 1,200 mg Oral BID Tay Flynn PA-C    insulin glargine 55 Units Subcutaneous HS MANAS Yoder    insulin lispro 1-5 Units Subcutaneous TID AC MANAS Yoder    insulin lispro 1-5 Units Subcutaneous HS MANAS Yoder    insulin lispro 20 Units Subcutaneous TID With Meals MANAS Yoder    ipratropium 0 5 mg Nebulization TID Trent Strafford, CRNP    levalbuterol 0 63 mg Nebulization Q4H PRN [facility] Yurik, CRNP    levalbuterol 1 25 mg Nebulization TID [facility] Yurik, CRNP    lidocaine 1 patch Topical Daily Cuiyin Yurik, CRNP    losartan 50 mg Oral BID Cuiyin Yurik, CRNP    metoprolol succinate 200 mg Oral Daily Cuiyin Yurik, CRNP    ondansetron 4 mg Intravenous Q6H PRN [facility] Yurik, CRNP    pantoprazole 40 mg Oral HS Cuiyin Yurik, CRNP    pregabalin 50 mg Oral TID Cuiyin Yurik, CRNP    QUEtiapine 200 mg Oral QAM Cuiyi Yurik, CRNP    QUEtiapine 400 mg Oral HS iyi Yurik, CRNP    saccharomyces boulardii 250 mg Oral BID Cuiyin Yurik, CRNP    sertraline 50 mg Oral HS [facility] Y[facility], CR[facility]    sodium chloride (PF) 3 mL Intravenous PRN MANAS Valenzuela         Today, Patient Was Seen By: Karyle Passer, CRNP    ** Please Note: Dictation voice to text software may have been used in the creation of this document   **

## 2020-02-05 NOTE — ASSESSMENT & PLAN NOTE
POA   As evidenced by fever 102 1, tachycardia, leukocytosis, with source of infection right lower lobe pneumonia  Lactic acid 2 7  Normalized after receiving normal saline 1000 mL in ED  Procalcitonin 4 89, decreased to 3 00    Blood cultures drawn; one of two sets + for gram + cocci; suspect contamination  Second set is negative to date  Temperature 99 9°  · Antibiotic as above     · Repeat procalcitonin in am   · Repeat cbc in am

## 2020-02-05 NOTE — ASSESSMENT & PLAN NOTE
Lab Results   Component Value Date    HGBA1C 8 3 (H) 01/06/2020       Recent Labs     02/04/20  1111 02/04/20  1612 02/04/20 2050 02/05/20  0707   POCGLU 254* 203* 169* 223*       Blood Sugar Average: Last 72 hrs:  (P) 267 5   · Continue Lyrica

## 2020-02-05 NOTE — ASSESSMENT & PLAN NOTE
· Likely secondary to right lower lobe pneumonia, left lower lobe atelectasis, mild COPD exacerbation  · Patient uses oxygen 2 L via nasal cannula with activity at home chronically  Uses BiPAP 12/5 with oxygen 2 L at HS for SHAVONNE  · Currently requiring 2 L nasal cannula and BiPAP at night  · Patient currently is on day 4 of ceftriaxone; discontinued azithromycin as urine antigens negative  · Appreciate pulmonology input  · Patient reports he is feeling improved; discussed potential discharge in am    · Reports he has home O2 and nebulizer at home already  · Encouraged patient to continue using his incentive spirometer and Acapella

## 2020-02-05 NOTE — ASSESSMENT & PLAN NOTE
· Blood pressure reviewed and acceptable, 133/80  · Continue losartan, metoprolol with holding parameter

## 2020-02-05 NOTE — PROGRESS NOTES
Progress Note - General Surgery   Jackqueline Schilder 60 y o  male MRN: [id number]  Unit/Bed#: [address] [place] [address] Encounter: [id number]    Assessment/Plan:  1) Left Axillary Abscess - In setting of morbid obesity and T2DM  No history/risk factors for MRSA  Likely 2/2 infected hair follicle  Approximately 1 5 cm x 1 cm with surrounding induration, erythema and minimal central fluctuance  Given small size, will likely resolve with continued IV rocephin/ po azithromycin for CAP  - AVSS  - Scant serosanguinous drainage on gauze dressing this am  - Continue antibiotics, staph coverage with azithromycin  - Will discuss with surgery team about lancing abscess to assist with drainage     Subjective/Objective   Chief Complaint: I am more sore today    Subjective:  Patient was seen and examined at bedside  Sitting upright in a recliner in no acute distress  He reports increased soreness from abscess this morning  Also reports continued cough  Otherwise reports he is feeling better   AVSS  Objective:     Blood pressure 133/80, pulse (!) 111, temperature 99 9 °F (37 7 °C), resp  rate 22, height 5' 10" (1 778 m), weight 123 kg (270 lb 15 1 oz), SpO2 91 %  ,Body mass index is 38 88 kg/m²  Intake/Output Summary (Last 24 hours) at 2/5/2020 5237  Last data filed at 2/4/2020 2045  Gross per 24 hour   Intake 90 ml   Output    Net 90 ml       Invasive Devices     Peripheral Intravenous Line            Peripheral IV 02/02/20 Left;Upper Arm 2 days              Physical Exam:   General appearance: alert and oriented, in no acute distress  Lungs: clear to auscultation bilaterally  Heart: regular rate and rhythm, S1, S2 normal, no murmur, click, rub or gallop  Skin: 1 5 x 1 cm abscess with minimal surrounding induration, erythema and central fluctuance  Abscess does appear to be draining with scant serosanguineous drainage on gauze dressing through very small hole approximately 1mm  Unable to express drainage through the hole      Lab, Imaging and other studies:  I have personally reviewed pertinent lab results    , CBC:   Lab Results   Component Value Date    WBC 9 68 02/05/2020    HGB 12 0 02/05/2020    HCT 36 6 02/05/2020    MCV 95 02/05/2020     02/05/2020    MCH 31 1 02/05/2020    MCHC 32 8 02/05/2020    RDW 13 1 02/05/2020    MPV 9 7 02/05/2020    NRBC 0 02/05/2020   , CMP:   Lab Results   Component Value Date    SODIUM 136 02/05/2020    K 3 5 02/05/2020    CL 99 (L) 02/05/2020    CO2 27 02/05/2020    BUN 19 02/05/2020    CREATININE 0 78 02/05/2020    CALCIUM 9 2 02/05/2020    EGFR 98 02/05/2020     VTE Pharmacologic Prophylaxis: Eliquis  VTE Mechanical Prophylaxis: sequential compression device

## 2020-02-05 NOTE — ASSESSMENT & PLAN NOTE
ProBNP 820  Chest x-ray no pulmonary edema  CT chest - Small right and trace left pleural effusions  Cardiomegaly and left atrial enlargement  Trace pericardial effusion  2D echo in 8/2019 - markedly reduced EF at 40-45%,mild diffuse hypokinesis,wall thickness was mildly increased  Nuclear stress test in 1/2020 showed normal LV EF  Pedal edema on exam, left worse than right, and trace leg edema bilateral   · Repeat 2D echo shows EF of 40-45%, no regional wall motion abnormalities  Mild diffuse hypokinesis, mild concentric hypertrophy and mild mitral valve regurgitation  No significant changes from previous study performed August 2019 as per Cardiology report  · Daily weight and I&Os    · Continue to monitor

## 2020-02-05 NOTE — ASSESSMENT & PLAN NOTE
Patient was prescribed prednisone taper 30 -20 -10 mg PO daily for 3 days by his pulmonologist's office last week  Patient had 3 days left of Prednisone  · Patient had been placed on prednisone initially, however he is not wheezing  Prednisone discontinued  · Continue Trelegy inhaler(substitute with Breo + atrovent neb)  · Appreciate pulmonology consultation and recommendations

## 2020-02-05 NOTE — ASSESSMENT & PLAN NOTE
Patient reported diarrhea uncontrollably at [facility] for past couple of days at home prior to admission  Denies antibiotic use in past 3 months  · Patient reports he had a medium sized formed BM yesterday     · Continue probiotic BID

## 2020-02-05 NOTE — PLAN OF CARE
Problem: Potential for Falls  Goal: Patient will remain free of falls  Description  INTERVENTIONS:  - Assess patient frequently for physical needs  -  Identify cognitive and physical deficits and behaviors that affect risk of falls  -  [place] fall precautions as indicated by assessment   - Educate patient/family on patient safety including physical limitations  - Instruct patient to call for assistance with activity based on assessment  - Modify environment to reduce risk of injury  - Consider OT/PT consult to assist with strengthening/mobility  Outcome: Progressing  Note:   Patient free from falls/ Compliant with fall precautions  Problem: Nutrition/Hydration-ADULT  Goal: Nutrient/Hydration intake appropriate for improving, restoring or maintaining nutritional needs  Description  Monitor and assess patient's nutrition/hydration status for malnutrition  Collaborate with interdisciplinary team and initiate plan and interventions as ordered  Monitor patient's weight and dietary intake as ordered or per policy  Utilize nutrition screening tool and intervene as necessary  Determine patient's food preferences and provide high-protein, high-caloric foods as appropriate       INTERVENTIONS:  - Monitor oral intake, urinary output, labs, and treatment plans  - Assess nutrition and hydration status and recommend course of action  - Evaluate amount of meals eaten  - Assist patient with eating if necessary   - Allow adequate time for meals  - Recommend/ encourage appropriate diets, oral nutritional supplements, and vitamin/mineral supplements  - Order, calculate, and assess calorie counts as needed  - Assess need for intravenous fluids  - Provide specific nutrition/hydration education as appropriate  - Include patient/family/caregiver in decisions related to nutrition   Outcome: Completed

## 2020-02-06 ENCOUNTER — PATIENT OUTREACH (OUTPATIENT)
Dept: CASE MANAGEMENT | Facility: OTHER | Age: 60
End: 2020-02-06

## 2020-02-06 ENCOUNTER — APPOINTMENT (INPATIENT)
Dept: RADIOLOGY | Facility: HOSPITAL | Age: 60
DRG: 871 | End: 2020-02-06
Payer: MEDICARE

## 2020-02-06 PROBLEM — D72.825 BANDEMIA: Status: ACTIVE | Noted: 2020-02-06

## 2020-02-06 PROBLEM — R19.7 DIARRHEA: Status: RESOLVED | Noted: 2020-02-03 | Resolved: 2020-02-06

## 2020-02-06 LAB
ANION GAP SERPL CALCULATED.3IONS-SCNC: 7 MMOL/L (ref 4–13)
BASOPHILS # BLD MANUAL: 0.15 THOUSAND/UL (ref 0–0.1)
BASOPHILS NFR MAR MANUAL: 2 % (ref 0–1)
BUN SERPL-MCNC: 16 MG/DL (ref 5–25)
CALCIUM SERPL-MCNC: 9.5 MG/DL (ref 8.3–10.1)
CHLORIDE SERPL-SCNC: 101 MMOL/L (ref 100–108)
CO2 SERPL-SCNC: 32 MMOL/L (ref 21–32)
CREAT SERPL-MCNC: 0.84 MG/DL (ref 0.6–1.3)
EOSINOPHIL # BLD MANUAL: 0 THOUSAND/UL (ref 0–0.4)
EOSINOPHIL NFR BLD MANUAL: 0 % (ref 0–6)
ERYTHROCYTE [DISTWIDTH] IN BLOOD BY AUTOMATED COUNT: 12.9 % (ref 11.6–15.1)
GFR SERPL CREATININE-BSD FRML MDRD: 95 ML/MIN/1.73SQ M
GLUCOSE SERPL-MCNC: 159 MG/DL (ref 65–140)
GLUCOSE SERPL-MCNC: 195 MG/DL (ref 65–140)
GLUCOSE SERPL-MCNC: 197 MG/DL (ref 65–140)
GLUCOSE SERPL-MCNC: 209 MG/DL (ref 65–140)
GLUCOSE SERPL-MCNC: 235 MG/DL (ref 65–140)
HCT VFR BLD AUTO: 39.3 % (ref 36.5–49.3)
HGB BLD-MCNC: 12.9 G/DL (ref 12–17)
LYMPHOCYTES # BLD AUTO: 2.17 THOUSAND/UL (ref 0.6–4.47)
LYMPHOCYTES # BLD AUTO: 28 % (ref 14–44)
MCH RBC QN AUTO: 31.4 PG (ref 26.8–34.3)
MCHC RBC AUTO-ENTMCNC: 32.8 G/DL (ref 31.4–37.4)
MCV RBC AUTO: 96 FL (ref 82–98)
MONOCYTES # BLD AUTO: 0.54 THOUSAND/UL (ref 0–1.22)
MONOCYTES NFR BLD: 7 % (ref 4–12)
NEUTROPHILS # BLD MANUAL: 4.02 THOUSAND/UL (ref 1.85–7.62)
NEUTS BAND NFR BLD MANUAL: 1 % (ref 0–8)
NEUTS SEG NFR BLD AUTO: 51 % (ref 43–75)
NRBC BLD AUTO-RTO: 0 /100 WBCS
PLATELET # BLD AUTO: 243 THOUSANDS/UL (ref 149–390)
PLATELET BLD QL SMEAR: ADEQUATE
PMV BLD AUTO: 9.7 FL (ref 8.9–12.7)
POTASSIUM SERPL-SCNC: 3.6 MMOL/L (ref 3.5–5.3)
PROCALCITONIN SERPL-MCNC: 0.51 NG/ML
PROCALCITONIN SERPL-MCNC: 0.74 NG/ML
RBC # BLD AUTO: 4.11 MILLION/UL (ref 3.88–5.62)
RBC MORPH BLD: NORMAL
SMUDGE CELLS BLD QL SMEAR: PRESENT
SODIUM SERPL-SCNC: 140 MMOL/L (ref 136–145)
TOTAL CELLS COUNTED SPEC: 100
VARIANT LYMPHS # BLD AUTO: 11 %
WBC # BLD AUTO: 7.74 THOUSAND/UL (ref 4.31–10.16)

## 2020-02-06 PROCEDURE — 97110 THERAPEUTIC EXERCISES: CPT

## 2020-02-06 PROCEDURE — 71045 X-RAY EXAM CHEST 1 VIEW: CPT

## 2020-02-06 PROCEDURE — 85027 COMPLETE CBC AUTOMATED: CPT | Performed by: NURSE PRACTITIONER

## 2020-02-06 PROCEDURE — 94664 DEMO&/EVAL PT USE INHALER: CPT

## 2020-02-06 PROCEDURE — 94640 AIRWAY INHALATION TREATMENT: CPT

## 2020-02-06 PROCEDURE — 94660 CPAP INITIATION&MGMT: CPT

## 2020-02-06 PROCEDURE — 94668 MNPJ CHEST WALL SBSQ: CPT

## 2020-02-06 PROCEDURE — 87040 BLOOD CULTURE FOR BACTERIA: CPT | Performed by: PHYSICIAN ASSISTANT

## 2020-02-06 PROCEDURE — 85007 BL SMEAR W/DIFF WBC COUNT: CPT | Performed by: NURSE PRACTITIONER

## 2020-02-06 PROCEDURE — 97535 SELF CARE MNGMENT TRAINING: CPT

## 2020-02-06 PROCEDURE — 94760 N-INVAS EAR/PLS OXIMETRY 1: CPT

## 2020-02-06 PROCEDURE — 87205 SMEAR GRAM STAIN: CPT | Performed by: PHYSICIAN ASSISTANT

## 2020-02-06 PROCEDURE — 82948 REAGENT STRIP/BLOOD GLUCOSE: CPT

## 2020-02-06 PROCEDURE — 99232 SBSQ HOSP IP/OBS MODERATE 35: CPT | Performed by: PHYSICIAN ASSISTANT

## 2020-02-06 PROCEDURE — 87070 CULTURE OTHR SPECIMN AEROBIC: CPT | Performed by: PHYSICIAN ASSISTANT

## 2020-02-06 PROCEDURE — 80048 BASIC METABOLIC PNL TOTAL CA: CPT | Performed by: NURSE PRACTITIONER

## 2020-02-06 PROCEDURE — 99232 SBSQ HOSP IP/OBS MODERATE 35: CPT | Performed by: NURSE PRACTITIONER

## 2020-02-06 PROCEDURE — 84145 PROCALCITONIN (PCT): CPT | Performed by: PHYSICIAN ASSISTANT

## 2020-02-06 RX ORDER — INSULIN GLARGINE 100 [IU]/ML
50 INJECTION, SOLUTION SUBCUTANEOUS
Status: DISCONTINUED | OUTPATIENT
Start: 2020-02-06 | End: 2020-02-11 | Stop reason: HOSPADM

## 2020-02-06 RX ORDER — POTASSIUM CHLORIDE 20 MEQ/1
20 TABLET, EXTENDED RELEASE ORAL ONCE
Status: COMPLETED | OUTPATIENT
Start: 2020-02-06 | End: 2020-02-06

## 2020-02-06 RX ORDER — LIDOCAINE HYDROCHLORIDE 40 MG/ML
5 INJECTION, SOLUTION RETROBULBAR; TOPICAL ONCE
Status: DISCONTINUED | OUTPATIENT
Start: 2020-02-07 | End: 2020-02-07

## 2020-02-06 RX ORDER — AZITHROMYCIN 250 MG/1
500 TABLET, FILM COATED ORAL EVERY 24 HOURS
Status: DISCONTINUED | OUTPATIENT
Start: 2020-02-06 | End: 2020-02-09

## 2020-02-06 RX ADMIN — INSULIN LISPRO 20 UNITS: 100 INJECTION, SOLUTION INTRAVENOUS; SUBCUTANEOUS at 13:03

## 2020-02-06 RX ADMIN — LOSARTAN POTASSIUM 50 MG: 50 TABLET, FILM COATED ORAL at 09:21

## 2020-02-06 RX ADMIN — PREGABALIN 50 MG: 50 CAPSULE ORAL at 22:00

## 2020-02-06 RX ADMIN — ACETYLCYSTEINE 800 MG: 200 SOLUTION ORAL; RESPIRATORY (INHALATION) at 13:19

## 2020-02-06 RX ADMIN — ACETYLCYSTEINE 800 MG: 200 SOLUTION ORAL; RESPIRATORY (INHALATION) at 07:39

## 2020-02-06 RX ADMIN — POTASSIUM CHLORIDE 20 MEQ: 1500 TABLET, EXTENDED RELEASE ORAL at 13:02

## 2020-02-06 RX ADMIN — BUSPIRONE HYDROCHLORIDE 15 MG: 10 TABLET ORAL at 18:06

## 2020-02-06 RX ADMIN — GUAIFENESIN 1200 MG: 600 TABLET, EXTENDED RELEASE ORAL at 18:06

## 2020-02-06 RX ADMIN — LIDOCAINE 1 PATCH: 50 PATCH TOPICAL at 05:09

## 2020-02-06 RX ADMIN — APIXABAN 5 MG: 5 TABLET, FILM COATED ORAL at 09:22

## 2020-02-06 RX ADMIN — IPRATROPIUM BROMIDE 0.5 MG: 0.5 SOLUTION RESPIRATORY (INHALATION) at 13:19

## 2020-02-06 RX ADMIN — INSULIN GLARGINE 50 UNITS: 100 INJECTION, SOLUTION SUBCUTANEOUS at 22:12

## 2020-02-06 RX ADMIN — BUSPIRONE HYDROCHLORIDE 15 MG: 10 TABLET ORAL at 09:21

## 2020-02-06 RX ADMIN — INSULIN LISPRO 20 UNITS: 100 INJECTION, SOLUTION INTRAVENOUS; SUBCUTANEOUS at 09:26

## 2020-02-06 RX ADMIN — ACETYLCYSTEINE 600 MG: 200 SOLUTION ORAL; RESPIRATORY (INHALATION) at 19:45

## 2020-02-06 RX ADMIN — PREGABALIN 50 MG: 50 CAPSULE ORAL at 16:14

## 2020-02-06 RX ADMIN — SERTRALINE HYDROCHLORIDE 50 MG: 50 TABLET ORAL at 22:11

## 2020-02-06 RX ADMIN — AZITHROMYCIN MONOHYDRATE 500 MG: 250 TABLET ORAL at 13:30

## 2020-02-06 RX ADMIN — INSULIN LISPRO 3 UNITS: 100 INJECTION, SOLUTION INTRAVENOUS; SUBCUTANEOUS at 17:55

## 2020-02-06 RX ADMIN — CEFTRIAXONE 2000 MG: 2 INJECTION, SOLUTION INTRAVENOUS at 19:46

## 2020-02-06 RX ADMIN — QUETIAPINE FUMARATE 400 MG: 100 TABLET ORAL at 22:12

## 2020-02-06 RX ADMIN — INSULIN LISPRO 1 UNITS: 100 INJECTION, SOLUTION INTRAVENOUS; SUBCUTANEOUS at 13:03

## 2020-02-06 RX ADMIN — DIGOXIN 250 MCG: 125 TABLET ORAL at 09:21

## 2020-02-06 RX ADMIN — IPRATROPIUM BROMIDE 0.5 MG: 0.5 SOLUTION RESPIRATORY (INHALATION) at 19:46

## 2020-02-06 RX ADMIN — LOSARTAN POTASSIUM 50 MG: 50 TABLET, FILM COATED ORAL at 18:06

## 2020-02-06 RX ADMIN — LEVALBUTEROL HYDROCHLORIDE 1.25 MG: 1.25 SOLUTION, CONCENTRATE RESPIRATORY (INHALATION) at 13:19

## 2020-02-06 RX ADMIN — APIXABAN 5 MG: 5 TABLET, FILM COATED ORAL at 18:06

## 2020-02-06 RX ADMIN — Medication 250 MG: at 18:06

## 2020-02-06 RX ADMIN — Medication 250 MG: at 09:19

## 2020-02-06 RX ADMIN — GUAIFENESIN 1200 MG: 600 TABLET, EXTENDED RELEASE ORAL at 09:19

## 2020-02-06 RX ADMIN — INSULIN LISPRO 20 UNITS: 100 INJECTION, SOLUTION INTRAVENOUS; SUBCUTANEOUS at 17:55

## 2020-02-06 RX ADMIN — INSULIN LISPRO 1 UNITS: 100 INJECTION, SOLUTION INTRAVENOUS; SUBCUTANEOUS at 22:12

## 2020-02-06 RX ADMIN — PREGABALIN 50 MG: 50 CAPSULE ORAL at 09:22

## 2020-02-06 RX ADMIN — QUETIAPINE FUMARATE 200 MG: 200 TABLET, EXTENDED RELEASE ORAL at 09:23

## 2020-02-06 RX ADMIN — ALPRAZOLAM 2 MG: 0.5 TABLET ORAL at 22:11

## 2020-02-06 RX ADMIN — FLUTICASONE FUROATE AND VILANTEROL TRIFENATATE 1 PUFF: 100; 25 POWDER RESPIRATORY (INHALATION) at 09:22

## 2020-02-06 RX ADMIN — ATORVASTATIN CALCIUM 40 MG: 40 TABLET, FILM COATED ORAL at 16:14

## 2020-02-06 RX ADMIN — LEVALBUTEROL HYDROCHLORIDE 1.25 MG: 1.25 SOLUTION, CONCENTRATE RESPIRATORY (INHALATION) at 07:39

## 2020-02-06 RX ADMIN — ACETAMINOPHEN 650 MG: 325 TABLET, FILM COATED ORAL at 07:32

## 2020-02-06 RX ADMIN — METOPROLOL SUCCINATE 200 MG: 100 TABLET, EXTENDED RELEASE ORAL at 09:22

## 2020-02-06 RX ADMIN — LEVALBUTEROL HYDROCHLORIDE 1.25 MG: 1.25 SOLUTION, CONCENTRATE RESPIRATORY (INHALATION) at 19:45

## 2020-02-06 RX ADMIN — IPRATROPIUM BROMIDE 0.5 MG: 0.5 SOLUTION RESPIRATORY (INHALATION) at 07:39

## 2020-02-06 RX ADMIN — INSULIN LISPRO 1 UNITS: 100 INJECTION, SOLUTION INTRAVENOUS; SUBCUTANEOUS at 09:25

## 2020-02-06 RX ADMIN — ASPIRIN 81 MG 81 MG: 81 TABLET ORAL at 09:22

## 2020-02-06 RX ADMIN — Medication 2000 MG: at 09:21

## 2020-02-06 RX ADMIN — PANTOPRAZOLE SODIUM 40 MG: 40 TABLET, DELAYED RELEASE ORAL at 22:12

## 2020-02-06 NOTE — ASSESSMENT & PLAN NOTE
Lab Results   Component Value Date    HGBA1C 8 3 (H) 01/06/2020       Recent Labs     02/07/20  1650 02/07/20  2056 02/08/20  0709 02/08/20  1058   POCGLU 172* 107 191* 274*       Blood Sugar Average: Last 72 hrs:  (P) 245 4     suboptimal control of diabetes  · Holding metformin during admission  · Decreased Lantus to 50 units at HS (takes 55 units at home)  · Continue mealtime insulin 20 units TID  · Diabetic diet  · Patient follows endocrine Dr Rob Quinones as outpatient  · Patient would benefit from weight loss and therapeutic lifestyle changes

## 2020-02-06 NOTE — ASSESSMENT & PLAN NOTE
Patient reported diarrhea uncontrollably at [facility] for past couple of days at home prior to admission  Denies antibiotic use in past 3 months  · Patient reports he had a medium sized formed BM yesterday     · Continue probiotic BID

## 2020-02-06 NOTE — PROGRESS NOTES
Progress Note Darrell Ford 1960, 60 y o  male MRN: [id number]    Unit/Bed#: [address] [place] [address] Encounter: [id number]    Primary Care Provider: Diana Mabry MD   Date and time admitted to hospital: 2/2/2020  6:55 PM        * Acute on chronic respiratory failure with hypoxia [facility])  Assessment & Plan  · Likely secondary to right lower lobe pneumonia, left lower lobe atelectasis  · Patient uses oxygen 2 L via nasal cannula with activity at home chronically  Uses BiPAP 12/5 with oxygen 2 L at HS for SHAVONNE  · Currently requiring 2 L nasal cannula continuously   · BiPAP transition to auto BiPAP   · Continue antibiotics for community-acquired pneumonia  · Appreciate pulmonology input  · Will need home O2 eval prior to discharge  If hemoptysis does not resolve may need a bronchoscopy  Community acquired pneumonia  Assessment & Plan  Chest x-ray unremarkable  CT chest - Right basilar consolidation with air bronchograms  Left basilar relaxation atelectasis  Flu/RSV negative  · Continue Rocephin 2 gm IV daily (weight based), day #5  · Resume Azithromycin for right axillary abscess coverage    · Continue Atrovent/Xopenex TID, Xopenex PRN, Mucinex  · MRSA screen negative  · Blood cultures as noted above  · Patient needs aggressive mucus clearance with acapella valve + IS 10x/hr while awake    · Pulmonary following, appreciate input  · Procalcitonin trending down  · Repeat CBC with diff, procalcitonin in am  · Consider bronchoscopy if hemoptysis continues   · Home O2 eval     Severe sepsis ([address] )  Assessment & Plan  POA  As evidenced by fever 102 1, tachycardia, leukocytosis, with source of infection right lower lobe pneumonia  Lactic acid 2 7  Normalized after receiving normal saline 1000 mL in ED  Blood cultures drawn; one of two sets + for gram + cocci; suspect contamination  Second set is negative to date  Tmax 102F last evening   Query if this is a false positive fever as a repeat temperature was checked within 15 minutes and it was normal  · Continue IV rocephin for CAP  · Resume Azithromycin for axillary abscess   · Procal 4 -> 3 -> 0 74  · Repeat CXR in am     Elevated brain natriuretic peptide (BNP) level  Assessment & Plan  ProBNP 820  Chest x-ray no pulmonary edema  CT chest - Small right and trace left pleural effusions  Cardiomegaly and left atrial enlargement  Trace pericardial effusion  Nuclear stress test in 1/2020 showed normal LV EF  Repeat 2D echo shows EF of 40-45%, no regional wall motion abnormalities  Mild diffuse hypokinesis, mild concentric hypertrophy and mild mitral valve regurgitation  No significant changes from previous study performed August 2019 as per Cardiology report  · CXR with bilateral small effusions   · Daily weight and I&Os  · Continue to monitor     Chronic a-fib  Assessment & Plan  Patient presented with uncontrolled AFib  EKG AFib, heart rate 125  · Heart rate improved with home medications, continue Toprol  mg daily and digoxin 250 mcg daily  · Tele - controlled AFib  Will discontinue  · Monitor electrolytes, keep potassium > 4 and magnesium > 2  · Magnesium 1 9, will give oral Mag oxide for 2 doses  · Continue Eliquis    COPD (chronic obstructive pulmonary disease) ([facility] [address] )  Assessment & Plan  Patient was prescribed prednisone taper 30 -20 -10 mg PO daily for 3 days by his pulmonologist's office last week  Patient had 3 days left of Prednisone  · Patient had been placed on prednisone initially, however he is not wheezing  Prednisone discontinued  · Continue Trelegy inhaler(substitute with Breo + atrovent neb)  · Appreciate pulmonology consultation and recommendations  Hyponatremia  Assessment & Plan  Sodium 130  Acute on chronic  Baseline sodium appears to be 134-135, likely due to psych medications + PNA    Na stable, monitor    Stage 2 chronic kidney disease  Assessment & Plan  Baseline creatinine 0 9-1 2s  · Creatinine stable 0 84    Obesity (BMI 30-39  9)  Assessment & Plan  Body mass index is 39 86 kg/m²  Diet and lifestyle modification    SHAVONNE treated with BiPAP  Assessment & Plan  Continue BiPAP, changed to auto BiPAP at HS    Hyperlipidemia  Assessment & Plan  Continue Lipitor  Therapeutic lifestyle changes  Cardiac diet  Type 2 diabetes mellitus with complication, with long-term current use of insulin [facility])  Assessment & Plan  Lab Results   Component Value Date    HGBA1C 8 3 (H) 01/06/2020       Recent Labs     02/05/20  1620 02/05/20  2034 02/06/20  0755 02/06/20  1129   POCGLU 200* 106 209* 197*       Blood Sugar Average: Last 72 hrs:  (P) 245 4   A1c 8 3, suboptimal control of diabetes  · Holding metformin  · Continue mealtime insulin 20 units TID  · Patient is on Lantus 55 units at HS  He had an evening BS of 106, therefore, Lantus was decreased to 40 units at HS  · At this time, BS are stable  Will increase Lantus to 50 units at HS   · Diabetic diet  · Patient follows Dr Roxy Elena as outpatient  · Patient would benefit from weight loss and therapeutic lifestyle changes  Diabetic neuropathy [facility])  Assessment & Plan  Lab Results   Component Value Date    HGBA1C 8 3 (H) 01/06/2020       Recent Labs     02/05/20  1620 02/05/20  2034 02/06/20  0755 02/06/20  1129   POCGLU 200* 106 209* 197*       Blood Sugar Average: Last 72 hrs:  (P) 245 4   · Continue Lyrica    Chronic reflux esophagitis  Assessment & Plan  Continue PPI    Benign essential hypertension  Assessment & Plan  · Blood pressure reviewed and acceptable, 131/86  · Continue losartan, metoprolol with holding parameter    CAD (coronary artery disease)  Assessment & Plan  Nuclear stress test 1/2020 was normal   Normal LV systolic function on nuclear stress test   · Continue Metoprolol, Aspirin, Lipitor    Bipolar affective disorder (HCC)  Assessment & Plan  · Continue Xanax, BuSpar, Seroquel, Zoloft  · Supportive care       Diarrhearesolved as of 2/6/2020  Assessment & Plan  Patient reported diarrhea uncontrollably at [facility] for past couple of days at home prior to admission  Denies antibiotic use in past 3 months  · Patient reports he had a medium sized formed BM yesterday  · Continue probiotic BID      VTE Pharmacologic Prophylaxis:   Pharmacologic: Apixaban (Eliquis)  Mechanical VTE Prophylaxis in Place: Yes    Patient Centered Rounds: I have performed bedside rounds with nursing staff today  Discussions with Specialists or Other Care Team Provider:  Nursing, case management, pulmonary    Education and Discussions with Family / Patient:  I have answered all questions to the best of my ability  Time Spent for Care: 20 minutes  More than 50% of total time spent on counseling and coordination of care as described above  Current Length of Stay: 4 day(s)    Current Patient Status: Inpatient   Certification Statement: The patient will continue to require additional inpatient hospital stay due to Acute on chronic respiratory failure with hypoxia setting of pneumonia    Discharge Plan:  Likely discharge home tomorrow    Code Status: Level 1 - Full Code      Subjective:   Resting in bed  Overall, patient feels like he has greatly improved since admission  He continues to have fatigue and desires sleep  His breathing is better  He is less rhonchorous  His cough is improved  He is walking to the bathroom  His BiPAP has been adjusted  He will wear his home BiPAP tonight to see how he tolerates overnight  He denies any chest pain, vomiting, diarrhea  Objective:     Vitals:   Temp (24hrs), Av 2 °F (37 3 °C), Min:97 8 °F (36 6 °C), Max:102 °F (38 9 °C)    Temp:  [97 8 °F (36 6 °C)-102 °F (38 9 °C)] 98 5 °F (36 9 °C)  HR:  [72-96] 93  Resp:  [18-22] 18  BP: (120-138)/(71-96) 131/86  SpO2:  [92 %-97 %] 94 %  Body mass index is 39 86 kg/m²  Input and Output Summary (last 24 hours):        Intake/Output Summary (Last 24 hours) at 2020 1503  Last data filed at 2020 7726  Gross per 24 hour   Intake 50 ml   Output 550 ml   Net -500 ml       Physical Exam:     Physical Exam   Constitutional: He is oriented to person, place, and time  He appears well-developed  No distress  Obese, lying in bed on 2 L nasal cannula   HENT:   Head: Normocephalic  Neck: Normal range of motion  Cardiovascular: Normal rate  An irregular rhythm present  Pulmonary/Chest: Effort normal  No accessory muscle usage  Tachypnea (With conversation) noted  No respiratory distress  He has wheezes in the right lower field and the left lower field  He has no rhonchi  He has no rales  Abdominal: Soft  Bowel sounds are normal  He exhibits no distension  There is no tenderness  Obese   Musculoskeletal: Normal range of motion  He exhibits no edema or tenderness  Neurological: He is alert and oriented to person, place, and time  Skin: Skin is warm and dry  Capillary refill takes less than 2 seconds  He is not diaphoretic  Left axillary abscess, currently opened about half a cm with mild drainage  Does not appear to be grossly infected  Psychiatric: He has a normal mood and affect  Judgment normal    Nursing note and vitals reviewed  Additional Data:     Labs:    Results from last 7 days   Lab Units 02/06/20  0511  02/04/20  0522   WBC Thousand/uL 7 74   < > 10 51*   HEMOGLOBIN g/dL 12 9   < > 13 2   HEMATOCRIT % 39 3   < > 39 7   PLATELETS Thousands/uL 243   < > 193   NEUTROS PCT %  --   --  62   LYMPHS PCT %  --   --  29   LYMPHO PCT % 28   < >  --    MONOS PCT %  --   --  6   MONO PCT % 7   < >  --    EOS PCT % 0   < > 1    < > = values in this interval not displayed       Results from last 7 days   Lab Units 02/06/20  0511  02/02/20  1904   POTASSIUM mmol/L 3 6   < > 4 0   CHLORIDE mmol/L 101   < > 92*   CO2 mmol/L 32   < > 25   BUN mg/dL 16   < > 24   CREATININE mg/dL 0 84   < > 1 25   CALCIUM mg/dL 9 5   < > 9 4   ALK PHOS U/L  --   --  70   ALT U/L  --   --  23   AST U/L  --   --  20    < > = values in this interval not displayed  Results from last 7 days   Lab Units 02/02/20  1904   INR  1 08       * I Have Reviewed All Lab Data Listed Above  * Additional Pertinent Lab Tests Reviewed: All Labs Within Last 24 Hours Reviewed    Imaging:    Imaging Reports Reviewed Today Include: CXR, CT C/A/P, repeat chest x-ray  Imaging Personally Reviewed by Myself Includes:  None    Recent Cultures (last 7 days):     Results from last 7 days   Lab Units 02/03/20  1046 02/03/20  0619 02/02/20  1908   BLOOD CULTURE   --   --  No Growth at 72 hrs  Staphylococcus coagulase negative*   SPUTUM CULTURE  1+ Growth of   Commensal respiratory bala only; No significant growth of Staph aureus/MRSA or Pseudomonas aeruginosa    --   --    GRAM STAIN RESULT  1+ Polys  1+ Mononuclear Cells  No organisms seen  --  Gram positive cocci in clusters*   LEGIONELLA URINARY ANTIGEN   --  Negative  --        Last 24 Hours Medication List:     Current Facility-Administered Medications:  acetaminophen 650 mg Oral Q6H PRN MANAS Yoder    acetylcysteine 3 mL Nebulization TID Nicky Marin PA-C    ALPRAZolam 2 mg Oral HS MANAS Yoder    apixaban 5 mg Oral BID MANAS Yoder    aspirin 81 mg Oral QAM MANAS Yoder    atorvastatin 40 mg Oral Daily With Omar & Tarun, MANAS    azithromycin 500 mg Oral Q24H Edrie Filter, MANAS    busPIRone 15 mg Oral BID MANAS Yoder    cefTRIAXone 2,000 mg Intravenous Q24H MANAS Yoder Last Rate: 2,000 mg (02/05/20 2046)   digoxin 250 mcg Oral Daily MANAS Yoder    ergocalciferol 50,000 Units Oral Weekly MANAS Yoder    fish oil 2,000 mg Oral BID MANAS Yoder    fluticasone-vilanterol 1 puff Inhalation Daily MANAS Yoder    guaiFENesin 1,200 mg Oral BID Priscille MANGO Marin    insulin glargine 50 Units Subcutaneous HS Edrie Filter, MANAS    insulin lispro 1-5 Units Subcutaneous TID AC MANAS Yoder    insulin lispro 1-5 Units Subcutaneous HS Cuiyin Yurik, CRNP    insulin lispro 20 Units Subcutaneous TID With Meals Cuiyin Yurik, CRNP    ipratropium 0 5 mg Nebulization TID Cuiyin Yurik, CRNP    levalbuterol 0 63 mg Nebulization Q4H PRN iyi Yurik, CRNP    levalbuterol 1 25 mg Nebulization TID Cuiyin Yurik, CRNP    lidocaine 1 patch Topical Daily Cuiyin Yurik, CRNP    losartan 50 mg Oral BID Cuiyin Yurik, CRNP    metoprolol succinate 200 mg Oral Daily Cuiyin Yurik, CRNP    ondansetron 4 mg Intravenous Q6H PRN iyi Yurik, CRNP    pantoprazole 40 mg Oral HS Cuiyin Yurik, CRNP    pregabalin 50 mg Oral TID Cuiyin Yurik, CRNP    QUEtiapine 200 mg Oral QAM Cuiyin Yurik, CRNP    QUEtiapine 400 mg Oral HS Cuiyin Yurik, CRNP    saccharomyces boulardii 250 mg Oral BID Cuiyin Yurik, CRNP    sertraline 50 mg Oral HS Cuiyin Yurik, CRNP    sodium chloride (PF) 3 mL Intravenous PRN MANAS Matt         Today, Patient Was Seen By: MANAS Weir    ** Please Note: Dictation voice to text software may have been used in the creation of this document   **

## 2020-02-06 NOTE — ASSESSMENT & PLAN NOTE
Sodium 130 on admission  Acute on chronic  Baseline sodium appears to be 134-135, likely due to psych medications + PNA    Na stable now at baseline, monitor

## 2020-02-06 NOTE — ASSESSMENT & PLAN NOTE
Nuclear stress test 1/2020 was normal   Normal LV systolic function on nuclear stress test   · Continue Metoprolol, Lipitor  · Holding ASA given hemoptysis

## 2020-02-06 NOTE — PLAN OF CARE
Problem: Potential for Falls  Goal: Patient will remain free of falls  Description  INTERVENTIONS:  - Assess patient frequently for physical needs  -  Identify cognitive and physical deficits and behaviors that affect risk of falls    -  [place] fall precautions as indicated by assessment   - Educate patient/family on patient safety including physical limitations  - Instruct patient to call for assistance with activity based on assessment  - Modify environment to reduce risk of injury  - Consider OT/PT consult to assist with strengthening/mobility  Outcome: Progressing     Problem: Prexisting or High Potential for Compromised Skin Integrity  Goal: Skin integrity is maintained or improved  Description  INTERVENTIONS:  - Identify patients at risk for skin breakdown  - Assess and monitor skin integrity  - Assess and monitor nutrition and hydration status  - Monitor labs   - Assist with mobility/ambulation  - Relieve pressure over bony prominences  - Avoid friction and shearing  - Provide appropriate hygiene as needed including keeping skin clean and dry  - Evaluate need for skin moisturizer/barrier cream  - Collaborate with interdisciplinary team   - Patient/family teaching  - Consider wound care consult    Outcome: Progressing     Problem: RESPIRATORY - ADULT  Goal: Achieves optimal ventilation and oxygenation  Description  INTERVENTIONS:  - Assess for changes in respiratory status  - Assess for changes in mentation and behavior  - Position to facilitate oxygenation and minimize respiratory effort  - Oxygen administered by appropriate delivery if ordered  Intiate bi-pap  Intiate bronchodilator therapy  Q 6 hours  - Encourage broncho-pulmonary hygiene including cough, deep breathe, Incentive Spirometry  - Assess the need for suctioning and aspirate as needed  - Assess and instruct to report SOB or any respiratory difficulty  - Respiratory Therapy support as indicated    Outcome: Progressing

## 2020-02-06 NOTE — ASSESSMENT & PLAN NOTE
Patient has mildly elevating leukocytosis up to 9000 from 1000 yesterday  New bandemia of 11% with fever of 102 overnight  Will repeat blood cultures  Repeat sputum culture  Trend procalcitonin again today and tomorrow morning  Repeat chest x-ray  Would favor keeping patient in the hospital today   Possible escalation of antibiotics based on progression and lab data
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
## 2020-02-06 NOTE — PHYSICAL THERAPY NOTE
PT TREATMENT     02/06/20 2265   Pain Assessment   Pain Assessment 0-10   Pain Score 5  (L rib area)   Restrictions/Precautions   Other Precautions Fall Risk;O2   General   Chart Reviewed Yes   Family/Caregiver Present No   Cognition   Arousal/Participation Cooperative   Subjective   Subjective patient states having a procedure scheduled for tomorrow and possible return home on Saturday   Bed Mobility   Supine to Sit 7  Independent   Transfers   Sit to Stand 7  Independent   Stand to Sit 7  Independent   Ambulation/Elevation   Gait Assistance   (supervision to min assist)   Additional items Assist x 1;Verbal cues; Tactile cues   Assistive Device Rolling walker  (O2)   Distance 60 feet with change in direction, slow gait speed and standing rest periods at times for energy conservation and without balance loss at this time   Balance   Static Sitting Good   Dynamic Sitting Fair +   Static Standing Fair   Dynamic Standing Fair -   Ambulatory Fair -   Activity Tolerance   Activity Tolerance Patient tolerated treatment well;Patient limited by fatigue   Exercises   Hip Flexion Sitting;10 reps;Bilateral   Knee AROM Long Arc Quad Sitting;10 reps;Bilateral   Ankle Pumps Sitting;10 reps;Bilateral   Marching Standing;20 reps;Bilateral   Balance training  sidestepping and backward walking completed for balance and strengthening   Assessment   Assessment patient cooperative and demonstrating improving gait balance and endurance and will benefit from continued PT with progression as tolerated  Plan   Treatment/Interventions ADL retraining;Functional transfer training;LE strengthening/ROM; Therapeutic exercise; Endurance training;Patient/family training;Equipment eval/education;Gait training; Compensatory technique education   PT Frequency 5x/wk   Recommendation   Recommendation Home PT   Licensure   [place] License Number  Nicolasa Spring PT [id number]

## 2020-02-06 NOTE — ASSESSMENT & PLAN NOTE
Patient with elevated ProBNP 820 on admission  Chest x-ray no pulmonary edema  CT chest - Small right and trace left pleural effusions  Cardiomegaly and left atrial enlargement  Trace pericardial effusion  Nuclear stress test in 1/2020 showed normal LV EF  2D echo shows EF of 40-45%, no regional wall motion abnormalities  Mild diffuse hypokinesis, mild concentric hypertrophy and mild mitral valve regurgitation  No significant changes from previous study performed August 2019 as per Cardiology report    · CXR with bilateral small effusions   · Daily weight and I&Os  · Continue to monitor

## 2020-02-06 NOTE — OCCUPATIONAL THERAPY NOTE
OT TREATMENT       02/06/20 1513   Restrictions/Precautions   Other Precautions Fall Risk;O2   Pain Assessment   Pain Assessment 0-10   Pain Score 6   Pain Location Abdomen   Pain Orientation Left   ADL   Grooming Assistance 5  Supervision/Setup   Grooming Deficit Wash/dry hands   Grooming Comments standing at sink   Toileting Assistance  5  Supervision/Setup   Toileting Comments toilet transfer    Transfers   Sit to Stand 5  Supervision   Stand to Sit 5  Supervision   Toilet transfer 5  Supervision   Functional Mobility   Functional Mobility 5  Supervision   Additional Comments 20 feet x 2, to and from bathroom    Additional items Rolling walker   ROM- Right Upper Extremities   R Shoulder AROM; Flexion; Horizontal ABduction   R Elbow AROM;Elbow flexion;Elbow extension   R Hand AROM; Thumb; Index finger; Long finger;Ring finger;Little finger   R Weight/Reps/Sets 2x5 reps seated on edge of bed    ROM - Left Upper Extremities    L Shoulder AROM; Flexion; Horizontal ABduction   L Elbow AROM;Elbow flexion;Elbow extension   L Hand AROM; Thumb; Index finger; Long finger;Ring finger;Little finger   L Weight/Reps/Sets 2x5 reps seated on edge of bed    Additional Activities   Additional Activities Comments Patient instructed on energy conservation techniques  Patient verbalized understanding  Activity Tolerance   Activity Tolerance Patient limited by fatigue   Assessment   Assessment Patient is cooperative and pleasant  Limited by fatigue  Patient was receptive to energy conservation techniques and OT session today  Patient requires supervision for toileting and standing for grooming activity with RW  Plan   Treatment Interventions ADL retraining;Energy conservation; Activityengagement;Patient/family training;UE strengthening/ROM; Functional transfer training; Compensatory technique education   Recommendation   OT Discharge Recommendation Home OT   Licensure   [place] License Number  Octavia Postin [facility] OTR/L [id number]

## 2020-02-06 NOTE — ASSESSMENT & PLAN NOTE
Likely secondary to PNA, atelectasis  · Patient uses oxygen 2 L NC continuous, BiPAP 12/5 w/ 2 L at HS for SHAVONNE  · Continue bronchodilators, Mucomyst, aggressive pulmonary hygiene  · BiPAP transitioned to auto BiPAP -tolerating  · Will need home O2 eval prior to discharge      · Appreciate pulmonary input

## 2020-02-06 NOTE — PLAN OF CARE
Problem: OCCUPATIONAL THERAPY ADULT  Goal: Performs self-care activities at highest level of function for planned discharge setting  See evaluation for individualized goals  Outcome: Progressing  Note:   Limitation: Decreased ADL status, Decreased UE strength, Decreased Safe judgement during ADL, Decreased endurance, Decreased self-care trans, Decreased high-level ADLs(decreased balance and mobility )  Prognosis: Good  Assessment: Patient is cooperative and pleasant  Limited by fatigue  Patient was receptive to energy conservation techniques and OT session today    Patient requires supervision for toileting and standing for grooming activity with RW        OT Discharge Recommendation: Home OT

## 2020-02-06 NOTE — ASSESSMENT & PLAN NOTE
POA   A/e/b fever 102 1, tachycardia, leukocytosis, with source of infection RLL PNA  Lactic acid 2 7  Normalized after receiving normal saline 1000 mL in ED  · Initial Blood cultures: one of two sets + for gram + cocci; suspect contamination     · Repeat set of blood cultures: no growth at 24 hrs  · Sputum cx: mixed resp bala  · MRSA screen negative  · Procal 4 -> 0 4  · Monitor temps and WBC

## 2020-02-06 NOTE — PLAN OF CARE
Problem: Potential for Falls  Goal: Patient will remain free of falls  Description  INTERVENTIONS:  - Assess patient frequently for physical needs  -  Identify cognitive and physical deficits and behaviors that affect risk of falls    -  [place] fall precautions as indicated by assessment   - Educate patient/family on patient safety including physical limitations  - Instruct patient to call for assistance with activity based on assessment  - Modify environment to reduce risk of injury  - Consider OT/PT consult to assist with strengthening/mobility  Outcome: Progressing     Problem: Prexisting or High Potential for Compromised Skin Integrity  Goal: Skin integrity is maintained or improved  Description  INTERVENTIONS:  - Identify patients at risk for skin breakdown  - Assess and monitor skin integrity  - Assess and monitor nutrition and hydration status  - Monitor labs   - Assist with mobility/ambulation  - Relieve pressure over bony prominences  - Avoid friction and shearing  - Provide appropriate hygiene as needed including keeping skin clean and dry  - Evaluate need for skin moisturizer/barrier cream  - Collaborate with interdisciplinary team   - Patient/family teaching  - Consider wound care consult    Outcome: Progressing     Problem: RESPIRATORY - ADULT  Goal: Achieves optimal ventilation and oxygenation  Description  INTERVENTIONS:  - Assess for changes in respiratory status  - Assess for changes in mentation and behavior  - Position to facilitate oxygenation and minimize respiratory effort  - Oxygen administered by appropriate delivery if ordered  Intiate bi-pap  Intiate bronchodilator therapy  Q 6 hours  - Encourage broncho-pulmonary hygiene including cough, deep breathe, Incentive Spirometry  - Assess the need for suctioning and aspirate as needed  - Assess and instruct to report SOB or any respiratory difficulty  - Respiratory Therapy support as indicated    Outcome: Progressing

## 2020-02-06 NOTE — ASSESSMENT & PLAN NOTE
Patient presented with uncontrolled AFib  EKG AFib, heart rate 125  · Heart rate improved with home medications, continue Toprol  mg daily and digoxin 250 mcg daily  · Tele - controlled AFib  Discontinued tele  · Monitor electrolytes, goal potassium > 4 and magnesium > 2  · Holding  Eliquis given continued hemoptysis

## 2020-02-06 NOTE — ASSESSMENT & PLAN NOTE
Patient was already on steroid taper 30 -20 -10 mg PO daily for 3 days by his pulmonologist's office last week  · Prednisone discontinued  · Continue Trelegy inhaler (substitute with Breo + atrovent neb)  · Appreciate pulmonology consultation and recommendations

## 2020-02-07 ENCOUNTER — APPOINTMENT (INPATIENT)
Dept: RADIOLOGY | Facility: HOSPITAL | Age: 60
DRG: 871 | End: 2020-02-07
Payer: MEDICARE

## 2020-02-07 ENCOUNTER — TELEPHONE (OUTPATIENT)
Dept: ENDOCRINOLOGY | Facility: CLINIC | Age: 60
End: 2020-02-07

## 2020-02-07 ENCOUNTER — PATIENT OUTREACH (OUTPATIENT)
Dept: CASE MANAGEMENT | Facility: OTHER | Age: 60
End: 2020-02-07

## 2020-02-07 LAB
ANION GAP SERPL CALCULATED.3IONS-SCNC: 6 MMOL/L (ref 4–13)
BACTERIA BLD CULT: NORMAL
BASOPHILS # BLD MANUAL: 0 THOUSAND/UL (ref 0–0.1)
BASOPHILS NFR MAR MANUAL: 0 % (ref 0–1)
BUN SERPL-MCNC: 13 MG/DL (ref 5–25)
CALCIUM SERPL-MCNC: 8.4 MG/DL (ref 8.3–10.1)
CHLORIDE SERPL-SCNC: 101 MMOL/L (ref 100–108)
CO2 SERPL-SCNC: 29 MMOL/L (ref 21–32)
CREAT SERPL-MCNC: 0.81 MG/DL (ref 0.6–1.3)
EOSINOPHIL # BLD MANUAL: 0 THOUSAND/UL (ref 0–0.4)
EOSINOPHIL NFR BLD MANUAL: 0 % (ref 0–6)
ERYTHROCYTE [DISTWIDTH] IN BLOOD BY AUTOMATED COUNT: 12.8 % (ref 11.6–15.1)
GFR SERPL CREATININE-BSD FRML MDRD: 97 ML/MIN/1.73SQ M
GLUCOSE SERPL-MCNC: 107 MG/DL (ref 65–140)
GLUCOSE SERPL-MCNC: 172 MG/DL (ref 65–140)
GLUCOSE SERPL-MCNC: 172 MG/DL (ref 65–140)
GLUCOSE SERPL-MCNC: 178 MG/DL (ref 65–140)
GLUCOSE SERPL-MCNC: 181 MG/DL (ref 65–140)
GLUCOSE SERPL-MCNC: 184 MG/DL (ref 65–140)
HCT VFR BLD AUTO: 37.6 % (ref 36.5–49.3)
HGB BLD-MCNC: 12.6 G/DL (ref 12–17)
LYMPHOCYTES # BLD AUTO: 3.43 THOUSAND/UL (ref 0.6–4.47)
LYMPHOCYTES # BLD AUTO: 32 % (ref 14–44)
MAGNESIUM SERPL-MCNC: 1.7 MG/DL (ref 1.6–2.6)
MCH RBC QN AUTO: 31.8 PG (ref 26.8–34.3)
MCHC RBC AUTO-ENTMCNC: 33.5 G/DL (ref 31.4–37.4)
MCV RBC AUTO: 95 FL (ref 82–98)
METAMYELOCYTES NFR BLD MANUAL: 2 % (ref 0–1)
MONOCYTES # BLD AUTO: 0.86 THOUSAND/UL (ref 0–1.22)
MONOCYTES NFR BLD: 8 % (ref 4–12)
MYELOCYTES NFR BLD MANUAL: 2 % (ref 0–1)
NEUTROPHILS # BLD MANUAL: 5.57 THOUSAND/UL (ref 1.85–7.62)
NEUTS BAND NFR BLD MANUAL: 9 % (ref 0–8)
NEUTS SEG NFR BLD AUTO: 43 % (ref 43–75)
NRBC BLD AUTO-RTO: 0 /100 WBCS
PHOSPHATE SERPL-MCNC: 2.9 MG/DL (ref 2.3–4.1)
PLATELET # BLD AUTO: 268 THOUSANDS/UL (ref 149–390)
PLATELET BLD QL SMEAR: ADEQUATE
PMV BLD AUTO: 9.3 FL (ref 8.9–12.7)
POTASSIUM SERPL-SCNC: 4 MMOL/L (ref 3.5–5.3)
PROCALCITONIN SERPL-MCNC: 0.4 NG/ML
RBC # BLD AUTO: 3.96 MILLION/UL (ref 3.88–5.62)
RBC MORPH BLD: NORMAL
SMUDGE CELLS BLD QL SMEAR: PRESENT
SODIUM SERPL-SCNC: 136 MMOL/L (ref 136–145)
TOTAL CELLS COUNTED SPEC: 100
VARIANT LYMPHS # BLD AUTO: 4 %
WBC # BLD AUTO: 10.71 THOUSAND/UL (ref 4.31–10.16)

## 2020-02-07 PROCEDURE — 84100 ASSAY OF PHOSPHORUS: CPT | Performed by: NURSE PRACTITIONER

## 2020-02-07 PROCEDURE — 94640 AIRWAY INHALATION TREATMENT: CPT

## 2020-02-07 PROCEDURE — 71045 X-RAY EXAM CHEST 1 VIEW: CPT

## 2020-02-07 PROCEDURE — 83735 ASSAY OF MAGNESIUM: CPT | Performed by: NURSE PRACTITIONER

## 2020-02-07 PROCEDURE — 97116 GAIT TRAINING THERAPY: CPT

## 2020-02-07 PROCEDURE — 82948 REAGENT STRIP/BLOOD GLUCOSE: CPT

## 2020-02-07 PROCEDURE — 84145 PROCALCITONIN (PCT): CPT | Performed by: PHYSICIAN ASSISTANT

## 2020-02-07 PROCEDURE — 99232 SBSQ HOSP IP/OBS MODERATE 35: CPT | Performed by: NURSE PRACTITIONER

## 2020-02-07 PROCEDURE — 85027 COMPLETE CBC AUTOMATED: CPT | Performed by: NURSE PRACTITIONER

## 2020-02-07 PROCEDURE — 97110 THERAPEUTIC EXERCISES: CPT

## 2020-02-07 PROCEDURE — 85007 BL SMEAR W/DIFF WBC COUNT: CPT | Performed by: NURSE PRACTITIONER

## 2020-02-07 PROCEDURE — 99231 SBSQ HOSP IP/OBS SF/LOW 25: CPT | Performed by: INTERNAL MEDICINE

## 2020-02-07 PROCEDURE — 94760 N-INVAS EAR/PLS OXIMETRY 1: CPT

## 2020-02-07 PROCEDURE — 80048 BASIC METABOLIC PNL TOTAL CA: CPT | Performed by: NURSE PRACTITIONER

## 2020-02-07 RX ADMIN — INSULIN LISPRO 20 UNITS: 100 INJECTION, SOLUTION INTRAVENOUS; SUBCUTANEOUS at 12:28

## 2020-02-07 RX ADMIN — SERTRALINE HYDROCHLORIDE 50 MG: 50 TABLET ORAL at 21:13

## 2020-02-07 RX ADMIN — LEVALBUTEROL HYDROCHLORIDE 1.25 MG: 1.25 SOLUTION, CONCENTRATE RESPIRATORY (INHALATION) at 14:07

## 2020-02-07 RX ADMIN — INSULIN LISPRO 1 UNITS: 100 INJECTION, SOLUTION INTRAVENOUS; SUBCUTANEOUS at 16:56

## 2020-02-07 RX ADMIN — FLUTICASONE FUROATE AND VILANTEROL TRIFENATATE 1 PUFF: 100; 25 POWDER RESPIRATORY (INHALATION) at 09:17

## 2020-02-07 RX ADMIN — IPRATROPIUM BROMIDE 0.5 MG: 0.5 SOLUTION RESPIRATORY (INHALATION) at 08:07

## 2020-02-07 RX ADMIN — QUETIAPINE FUMARATE 400 MG: 100 TABLET ORAL at 21:13

## 2020-02-07 RX ADMIN — PREGABALIN 50 MG: 50 CAPSULE ORAL at 16:45

## 2020-02-07 RX ADMIN — AZITHROMYCIN MONOHYDRATE 500 MG: 250 TABLET ORAL at 10:08

## 2020-02-07 RX ADMIN — BUSPIRONE HYDROCHLORIDE 15 MG: 10 TABLET ORAL at 09:18

## 2020-02-07 RX ADMIN — LOSARTAN POTASSIUM 50 MG: 50 TABLET, FILM COATED ORAL at 09:18

## 2020-02-07 RX ADMIN — IPRATROPIUM BROMIDE 0.5 MG: 0.5 SOLUTION RESPIRATORY (INHALATION) at 14:07

## 2020-02-07 RX ADMIN — INSULIN GLARGINE 50 UNITS: 100 INJECTION, SOLUTION SUBCUTANEOUS at 21:13

## 2020-02-07 RX ADMIN — PANTOPRAZOLE SODIUM 40 MG: 40 TABLET, DELAYED RELEASE ORAL at 21:13

## 2020-02-07 RX ADMIN — GUAIFENESIN 1200 MG: 600 TABLET, EXTENDED RELEASE ORAL at 17:46

## 2020-02-07 RX ADMIN — BUSPIRONE HYDROCHLORIDE 15 MG: 10 TABLET ORAL at 17:46

## 2020-02-07 RX ADMIN — Medication 250 MG: at 17:46

## 2020-02-07 RX ADMIN — IPRATROPIUM BROMIDE 0.5 MG: 0.5 SOLUTION RESPIRATORY (INHALATION) at 20:10

## 2020-02-07 RX ADMIN — ATORVASTATIN CALCIUM 40 MG: 40 TABLET, FILM COATED ORAL at 16:45

## 2020-02-07 RX ADMIN — LEVALBUTEROL HYDROCHLORIDE 1.25 MG: 1.25 SOLUTION, CONCENTRATE RESPIRATORY (INHALATION) at 20:10

## 2020-02-07 RX ADMIN — INSULIN LISPRO 1 UNITS: 100 INJECTION, SOLUTION INTRAVENOUS; SUBCUTANEOUS at 12:27

## 2020-02-07 RX ADMIN — CEFTRIAXONE 2000 MG: 2 INJECTION, SOLUTION INTRAVENOUS at 20:56

## 2020-02-07 RX ADMIN — Medication 250 MG: at 09:18

## 2020-02-07 RX ADMIN — LOSARTAN POTASSIUM 50 MG: 50 TABLET, FILM COATED ORAL at 17:46

## 2020-02-07 RX ADMIN — METOPROLOL SUCCINATE 200 MG: 100 TABLET, EXTENDED RELEASE ORAL at 09:17

## 2020-02-07 RX ADMIN — GUAIFENESIN 1200 MG: 600 TABLET, EXTENDED RELEASE ORAL at 09:17

## 2020-02-07 RX ADMIN — ACETYLCYSTEINE 600 MG: 200 SOLUTION ORAL; RESPIRATORY (INHALATION) at 20:10

## 2020-02-07 RX ADMIN — LIDOCAINE 1 PATCH: 50 PATCH TOPICAL at 05:56

## 2020-02-07 RX ADMIN — INSULIN LISPRO 20 UNITS: 100 INJECTION, SOLUTION INTRAVENOUS; SUBCUTANEOUS at 16:56

## 2020-02-07 RX ADMIN — DIGOXIN 250 MCG: 125 TABLET ORAL at 09:18

## 2020-02-07 RX ADMIN — ACETYLCYSTEINE 3 ML: 200 SOLUTION ORAL; RESPIRATORY (INHALATION) at 08:07

## 2020-02-07 RX ADMIN — LEVALBUTEROL HYDROCHLORIDE 1.25 MG: 1.25 SOLUTION, CONCENTRATE RESPIRATORY (INHALATION) at 08:07

## 2020-02-07 RX ADMIN — ACETYLCYSTEINE 3 ML: 200 SOLUTION ORAL; RESPIRATORY (INHALATION) at 14:07

## 2020-02-07 RX ADMIN — PREGABALIN 50 MG: 50 CAPSULE ORAL at 09:18

## 2020-02-07 RX ADMIN — PREGABALIN 50 MG: 50 CAPSULE ORAL at 21:13

## 2020-02-07 RX ADMIN — QUETIAPINE FUMARATE 200 MG: 200 TABLET, EXTENDED RELEASE ORAL at 09:17

## 2020-02-07 RX ADMIN — ALPRAZOLAM 2 MG: 0.5 TABLET ORAL at 21:13

## 2020-02-07 NOTE — PROGRESS NOTES
Spoke to Gunnar at Dr Yudith Rader office and received order to cancel patient for ACTH test due to recent hospitalization  She said office will call Infusion when patient okay to be rescheduled

## 2020-02-07 NOTE — PROGRESS NOTES
Progress Note - Pulmonary   Elaina Bauman 60 y o  male MRN: [id number]  Unit/Bed#: 2 [place] [address] Encounter: [id number]      Assessment:  1   Acute on chronic hypoxic respiratory failure  2   RLL community acquired pneumonia with hemoptysis, now recurring  3  Restrictive lung disease secondary to obesity  4  Acute on chronic bronchitis   5  Afib on eliquis     Plan  1   Stop the Eliquis  Monitor if there is any additional bleeding  2  Continue Rocephin and zithromax  3   Continue duonebs  4   Continue mucomyst  5  Will plan for bronchoscopy on Monday if there is no resolution to hemoptysis  6  Patient switched to autoBiPAP and feels better with that change  Continue autoBiPAP on home machine  Will need to have compliance visit as outpatient  7  Continue breo while in hospital   Back to [facility] at discharge  8    Will need O2 assessment prior to discharge       Subjective:   He states that he had another somewhat larger episode of hemoptysis today  Though it was improving, now it has recurred  Objective:   Vitals: Blood pressure 147/84, pulse 96, temperature 98 °F (36 7 °C), temperature source Oral, resp  rate 18, height 5' 10" (1 778 m), weight 126 kg (277 lb 12 5 oz), SpO2 96 %  , 2L NC, Body mass index is 39 86 kg/m²  Intake/Output Summary (Last 24 hours) at 2/7/2020 1303  Last data filed at 2/6/2020 1855  Gross per 24 hour   Intake 1060 ml   Output 250 ml   Net 810 ml     Physical Exam  Gen: Awake, alert, oriented x 3, no acute distress  HEENT: Mucous membranes moist, no oral lesions, no thrush  NECK: No accessory muscle use, JVP not elevated  Cardiac: Regular, single S1, single S2, no murmurs, no rubs, no gallops  Lungs: Decreased breath sounds, scattered wheezing or rhonchi  Abdomen: normoactive bowel sounds, soft nontender, nondistended, no rebound or rigidity, no guarding  Extremities: no cyanosis, no clubbing, no edema    Labs: I have personally reviewed pertinent lab results    Results from last 7 days   Lab Units 02/07/20  0602 02/06/20  0511 02/05/20  0521 02/04/20  0522 02/03/20  0530 02/02/20  1904   WBC Thousand/uL 10 71* 7 74 9 68 10 51* 15 29* 17 36*   HEMOGLOBIN g/dL 12 6 12 9 12 0 13 2 10 9* 12 9   HEMATOCRIT % 37 6 39 3 36 6 39 7 32 8* 37 6   PLATELETS Thousands/uL 268 243 215 193 136* 152   NEUTROS PCT %  --   --   --  62 77* 79*   MONOS PCT %  --   --   --  6 7 5   MONO PCT % 8 7 10  --   --   --       Results from last 7 days   Lab Units 02/07/20  0602 02/06/20  0511 02/05/20  0521  02/02/20  1904   POTASSIUM mmol/L 4 0 3 6 3 5   < > 4 0   CHLORIDE mmol/L 101 101 99*   < > 92*   CO2 mmol/L 29 32 27   < > 25   BUN mg/dL 13 16 19   < > 24   CREATININE mg/dL 0 81 0 84 0 78   < > 1 25   CALCIUM mg/dL 8 4 9 5 9 2   < > 9 4   ALK PHOS U/L  --   --   --   --  70   ALT U/L  --   --   --   --  23   AST U/L  --   --   --   --  20    < > = values in this interval not displayed       Results from last 7 days   Lab Units 02/07/20  0602 02/05/20  0521 02/04/20  0522   MAGNESIUM mg/dL 1 7 1 9 1 9     Results from last 7 days   Lab Units 02/07/20  0602 02/04/20  0522   PHOSPHORUS mg/dL 2 9 4 2*      Results from last 7 days   Lab Units 02/02/20  1904   INR  1 08   PTT seconds 34     Results from last 7 days   Lab Units 02/02/20  2053   LACTIC ACID mmol/L 1 3     0   Lab Value Date/Time    TROPONINI <0 02 02/02/2020 1904    TROPONINI <0 02 12/30/2019 1629    TROPONINI <0 02 12/30/2019 1414    TROPONINI <0 02 08/06/2019 2313    TROPONINI 0 02 06/28/2019 2043    TROPONINI <0 02 06/28/2019 1632    TROPONINI <0 02 06/28/2019 1344    TROPONINI <0 02 06/11/2019 1130    TROPONINI 0 02 03/20/2019 2049    TROPONINI 0 03 03/20/2019 1445    TROPONINI <0 02 11/16/2018 0917    TROPONINI <0 02 11/16/2018 0438    TROPONINI <0 02 11/16/2018 0024    TROPONINI <0 02 10/31/2018 0926    TROPONINI 0 02 11/06/2017 1639    TROPONINI <0 02 11/06/2017 0927    TROPONINI <0 02 03/20/2017 0503    TROPONINI <0 02 03/20/2017 0009 TROPONINI <0 02 12/11/2016 1314    TROPONINI <0 02 11/28/2016 0905    TROPONINI <0 02 11/27/2016 1341    TROPONINI <0 02 11/27/2016 0908    TROPONINI <0 02 09/14/2016 1300    TROPONINI <0 02 09/12/2016 1706    TROPONINI <0 02 09/03/2016 0443    TROPONINI 0 02 09/02/2016 2321    TROPONINI 0 02 09/02/2016 2021    TROPONINI <0 02 09/02/2016 1435    TROPONINI <0 02 08/08/2016 1139    TROPONINI <0 02 01/13/2016 0930    TROPONINI <0 02 01/12/2016 1429         Meds/Allergies   Current Facility-Administered Medications   Medication Dose Route Frequency    acetaminophen (TYLENOL) tablet 650 mg  650 mg Oral Q6H PRN    acetylcysteine (MUCOMYST) 200 mg/mL inhalation solution 600 mg  3 mL Nebulization TID    ALPRAZolam (XANAX) tablet 2 mg  2 mg Oral HS    atorvastatin (LIPITOR) tablet 40 mg  40 mg Oral Daily With Dinner    azithromycin (ZITHROMAX) tablet 500 mg  500 mg Oral Q24H    busPIRone (BUSPAR) tablet 15 mg  15 mg Oral BID    cefTRIAXone (ROCEPHIN) IVPB (premix) 2,000 mg  2,000 mg Intravenous Q24H    digoxin (LANOXIN) tablet 250 mcg  250 mcg Oral Daily    ergocalciferol (VITAMIN D2) capsule 50,000 Units  50,000 Units Oral Weekly    fluticasone-vilanterol (BREO ELLIPTA) 100-25 mcg/inh inhaler 1 puff  1 puff Inhalation Daily    guaiFENesin (MUCINEX) 12 hr tablet 1,200 mg  1,200 mg Oral BID    insulin glargine (LANTUS) subcutaneous injection 50 Units 0 5 mL  50 Units Subcutaneous HS    insulin lispro (HumaLOG) 100 units/mL subcutaneous injection 1-5 Units  1-5 Units Subcutaneous TID AC    insulin lispro (HumaLOG) 100 units/mL subcutaneous injection 1-5 Units  1-5 Units Subcutaneous HS    insulin lispro (HumaLOG) 100 units/mL subcutaneous injection 20 Units  20 Units Subcutaneous TID With Meals    ipratropium (ATROVENT) 0 02 % inhalation solution 0 5 mg  0 5 mg Nebulization TID    levalbuterol (XOPENEX) inhalation solution 0 63 mg  0 63 mg Nebulization Q4H PRN    levalbuterol (XOPENEX) inhalation solution 1 25 mg  1 25 mg Nebulization TID    lidocaine (LIDODERM) 5 % patch 1 patch  1 patch Topical Daily    lidocaine HCl (PF) (XYLOCAINE) 4 % injection 5 mL  5 mL Inhalation Once    losartan (COZAAR) tablet 50 mg  50 mg Oral BID    metoprolol succinate (TOPROL-XL) 24 hr tablet 200 mg  200 mg Oral Daily    ondansetron (ZOFRAN) injection 4 mg  4 mg Intravenous Q6H PRN    pantoprazole (PROTONIX) EC tablet 40 mg  40 mg Oral HS    pregabalin (LYRICA) capsule 50 mg  50 mg Oral TID    QUEtiapine (SEROquel XR) 24 hr tablet 200 mg  200 mg Oral QAM    QUEtiapine (SEROquel) tablet 400 mg  400 mg Oral HS    saccharomyces boulardii (FLORASTOR) capsule 250 mg  250 mg Oral BID    sertraline (ZOLOFT) tablet 50 mg  50 mg Oral HS    sodium chloride (PF) 0 9 % injection 3 mL  3 mL Intravenous PRN     Medications Prior to Admission   Medication    albuterol (2 5 mg/3 mL) 0 083 % nebulizer solution    ALPRAZolam (XANAX) 2 MG tablet    aspirin 81 MG tablet    atorvastatin (LIPITOR) 40 mg tablet    busPIRone (BUSPAR) 15 mg tablet    digoxin (LANOXIN) 0 25 mg tablet    ELIQUIS 5 MG    fluticasone-umeclidinium-vilanterol (TRELEGY ELLIPTA) 100-62 5-25 MCG/INH inhaler    insulin aspart (NovoLOG) 100 units/mL injection    Insulin Glargine (BASAGLAR KWIKPEN SC)    losartan (COZAAR) 50 mg tablet    LYRICA 50 MG capsule    metFORMIN (GLUCOPHAGE-XR) 500 mg 24 hr tablet    metoprolol succinate (TOPROL-XL) 200 MG 24 hr tablet    omeprazole (PriLOSEC) 20 mg delayed release capsule    QUEtiapine (SEROquel XR) 200 mg 24 hr tablet    QUEtiapine (SEROquel XR) 400 mg 24 hr tablet    sertraline (ZOLOFT) 50 mg tablet    ergocalciferol (VITAMIN D2) 50,000 units    insulin aspart (NOVOLOG FLEXPEN) 100 Units/mL injection pen    Insulin Pen Needle (EASY TOUCH PEN NEEDLES) 31G X 5 MM MISC    lidocaine (LIDODERM) 5 %    predniSONE 10 mg tablet    sodium chloride 0 9 % nebulizer solution    VASCEPA 1 g CAPS    VENTOLIN  (90 Base) MCG/ACT inhaler    VOLTAREN 1 %         Microbiology:  Lab Results   Component Value Date    BLOODCX Received in Microbiology Lab  Culture in Progress  02/06/2020    BLOODCX Received in Microbiology Lab  Culture in Progress  02/06/2020    BLOODCX No Growth After 4 Days  02/02/2020    BLOODCX Staphylococcus coagulase negative (A) 02/02/2020    URINECX 3303-2936 cfu/ml Mixed Contaminants X2 03/20/2017    URINECX No Growth <1000 cfu/mL 11/27/2016    URINECX No Growth <1000 cfu/mL 09/02/2016    SPUTUMCULTUR 1+ Growth of  02/03/2020    SPUTUMCULTUR  02/03/2020     Commensal respiratory bala only; No significant growth of Staph aureus/MRSA or Pseudomonas aeruginosa  SPUTUMCULTUR Test not performed  Suggest repeat specimen  03/21/2019       No new images today      DO [facility]

## 2020-02-07 NOTE — PROGRESS NOTES
Progress Note Nabil Rangel 1960, 60 y o  male MRN: [id number]    Unit/Bed#: [address] [place] [address] Encounter: [id number]    Primary Care Provider: Raman Khan MD   Date and time admitted to hospital: 2/2/2020  6:55 PM        * Acute on chronic respiratory failure with hypoxia [facility])  Assessment & Plan  · Likely secondary to right lower lobe pneumonia, left lower lobe atelectasis  · Patient uses oxygen 2 L via nasal cannula with activity at home chronically  Uses BiPAP 12/5 with oxygen 2 L at HS for SHAVONNE  · Currently requiring 2 L nasal cannula continuously   · Continue bronchodilators, Mucomyst, aggressive pulmonary hygiene  · BiPAP transitioned to auto BiPAP -tolerating  · Continue antibiotics for community-acquired pneumonia  · Will need home O2 eval prior to discharge  · If hemoptysis does not resolve may need a bronchoscopy  · Appreciate pulmonary input    Community acquired pneumonia  Assessment & Plan  CT chest - Right basilar consolidation with air bronchograms  Left basilar relaxation atelectasis  Flu/RSV negative  · Continue Rocephin 2 gm IV daily (weight based), day #6  · Resume Azithromycin for right axillary abscess coverage, day #2  · Continue Atrovent/Xopenex TID, Xopenex PRN, Mucinex  · MRSA screen negative  · Blood cultures as noted above  · Patient needs aggressive mucus clearance with acapella valve + IS 10x/hr while awake    · Pulmonary following, appreciate input  · Procalcitonin trending down  · Continues with hemoptysis  Discontinue Eliquis and aspirin  Monitor for resolution of hemoptysis  If hemoptysis persists patient will likely need a bronchoscopy on Monday  · Patient will need a home O2 eval prior to discharge    Severe sepsis [facility])  Assessment & Plan  POA  As evidenced by fever 102 1, tachycardia, leukocytosis, with source of infection right lower lobe pneumonia  Lactic acid 2 7  Normalized after receiving normal saline 1000 mL in ED    Blood cultures drawn; one of two sets + for gram + cocci; suspect contamination  Second set of blood culture show no growth to date  Tmax 102F in past 48 hours  · Continue IV rocephin for CAP  · Resumed Azithromycin for axillary abscess   · Procal 4 -> 3 -> 0 74 -> 0 51 -> 0 4  · Repeat chest x-ray: Persistent right lower lobe consolidation/atelectasis with suspected small effusions  · Monitor for worsening signs of infection    Chronic a-fib  Assessment & Plan  Patient presented with uncontrolled AFib  EKG AFib, heart rate 125  · Heart rate improved with home medications, continue Toprol  mg daily and digoxin 250 mcg daily  · Tele - controlled AFib  Will discontinue  · Monitor electrolytes, keep potassium > 4 and magnesium > 2  · Pulmonary would like to hold Eliquis and monitor for resolution of hemoptysis  If no resolution and hemoptysis patient would require bronchoscopy on Monday  Continue SCDs and encourage ambulation  CAD (coronary artery disease)  Assessment & Plan  Nuclear stress test 1/2020 was normal   Normal LV systolic function on nuclear stress test   · Continue Metoprolol, Lipitor  · Hold ASA until hemoptysis resolves    Elevated brain natriuretic peptide (BNP) level  Assessment & Plan  ProBNP 820  Chest x-ray no pulmonary edema  CT chest - Small right and trace left pleural effusions  Cardiomegaly and left atrial enlargement  Trace pericardial effusion  Nuclear stress test in 1/2020 showed normal LV EF  Repeat 2D echo shows EF of 40-45%, no regional wall motion abnormalities  Mild diffuse hypokinesis, mild concentric hypertrophy and mild mitral valve regurgitation  No significant changes from previous study performed August 2019 as per Cardiology report    · CXR with bilateral small effusions   · Daily weight and I&Os  · Continue to monitor     COPD (chronic obstructive pulmonary disease) ([facility] [address] )  Assessment & Plan  Patient was prescribed prednisone taper 30 -20 -10 mg PO daily for 3 days by his pulmonologist's office last week  Patient had 3 days left of Prednisone  · Patient had been placed on prednisone initially, however he is not wheezing  Prednisone discontinued  · Continue Trelegy inhaler(substitute with Breo + atrovent neb)  · Appreciate pulmonology consultation and recommendations  Hyponatremia  Assessment & Plan  Sodium 130  Acute on chronic  Baseline sodium appears to be 134-135, likely due to psych medications + PNA  Na stable, monitor    Stage 2 chronic kidney disease  Assessment & Plan  Baseline creatinine 0 9-1 2s  · Creatinine stable 0 84    Obesity (BMI 30-39  9)  Assessment & Plan  Body mass index is 39 86 kg/m²  Diet and lifestyle modification    SHAVONNE treated with BiPAP  Assessment & Plan  Continue BiPAP, changed to auto BiPAP at HS  Patient is using and tolerating his BiPAP from home which has been changed to auto BiPAP  Hyperlipidemia  Assessment & Plan  Continue Lipitor  Therapeutic lifestyle changes  Cardiac diet  Type 2 diabetes mellitus with complication, with long-term current use of insulin [facility])  Assessment & Plan  Lab Results   Component Value Date    HGBA1C 8 3 (H) 01/06/2020       Recent Labs     02/05/20  1620 02/05/20  2034 02/06/20  0755 02/06/20  1129   POCGLU 200* 106 209* 197*       Blood Sugar Average: Last 72 hrs:  (P) 245 4   A1c 8 3, suboptimal control of diabetes  · Holding metformin  · Decrease Lantus to 50 units at HS (takes 55 units at home)  · Continue mealtime insulin 20 units TID - hold if NPO  · Diabetic diet  · Patient follows Dr Delaney Bo as outpatient  · Patient would benefit from weight loss and therapeutic lifestyle changes       Diabetic neuropathy [facility])  Assessment & Plan  Lab Results   Component Value Date    HGBA1C 8 3 (H) 01/06/2020       Recent Labs     02/05/20  1620 02/05/20  2034 02/06/20  0755 02/06/20  1129   POCGLU 200* 106 209* 197*       Blood Sugar Average: Last 72 hrs:  (P) 245 4   · Continue Lyrica    Chronic reflux esophagitis  Assessment & Plan  Continue PPI    Benign essential hypertension  Assessment & Plan  · Blood pressure reviewed and acceptable, 141/77  · Continue losartan, metoprolol with holding parameter    Bipolar affective disorder (HCC)  Assessment & Plan  · Continue Xanax, BuSpar, Seroquel, Zoloft  · Supportive care  Diarrhearesolved as of 2/6/2020  Assessment & Plan  Patient reported diarrhea uncontrollably at [facility] for past couple of days at home prior to admission  Denies antibiotic use in past 3 months  · Patient reports he had a medium sized formed BM yesterday  · Continue probiotic BID      VTE Pharmacologic Prophylaxis:   Pharmacologic: Holding Eliquis due to hemoptysis  Mechanical VTE Prophylaxis in Place: Yes    Patient Centered Rounds: I have performed bedside rounds with nursing staff today  Discussions with Specialists or Other Care Team Provider:  Nursing, case management, pulmonary    Education and Discussions with Family / Patient:  I have answered all questions to the best of my ability  Time Spent for Care: 20 minutes  More than 50% of total time spent on counseling and coordination of care as described above  Current Length of Stay: 5 day(s)    Current Patient Status: Inpatient   Certification Statement: The patient will continue to require additional inpatient hospital stay due to Acute on chronic respiratory failure with hypoxia setting of pneumonia with hemoptysis    Discharge Plan:  Patient is not medically stable for discharge today due to hemoptysis  On able to perform a bronchoscopy today as patient is on Eliquis  We will hold Eliquis today monitor for resolution of hemoptysis  If no resolution hemoptysis the patient may require bronchoscopy on Monday  Code Status: Level 1 - Full Code      Subjective:   Resting comfortably in bed  Had a good night sleep  Used his own BiPAP from home which was changed to auto BiPAP per Pulmonary  Denies any chest pain    Believes his breathing is improving  Denies headache, dizziness, abdominal pain, nausea, vomiting, diarrhea  Continues with hemoptysis  Objective:     Vitals:   Temp (24hrs), Av 7 °F (37 1 °C), Min:98 °F (36 7 °C), Max:99 2 °F (37 3 °C)    Temp:  [98 °F (36 7 °C)-99 2 °F (37 3 °C)] 98 8 °F (37 1 °C)  HR:  [68-96] 83  Resp:  [18] 18  BP: (112-147)/(60-84) 141/77  SpO2:  [87 %-96 %] 94 %  Body mass index is 39 86 kg/m²  Input and Output Summary (last 24 hours): Intake/Output Summary (Last 24 hours) at 2020 1637  Last data filed at 2020 1855  Gross per 24 hour   Intake 580 ml   Output    Net 580 ml       Physical Exam:     Physical Exam   Constitutional: He is oriented to person, place, and time  He appears well-developed and well-nourished  No distress  Pleasant gentleman resting comfortably in bed on 2 L nasal cannula  Morbidly obese  Appears mildly disheveled  HENT:   Head: Normocephalic  Neck: Normal range of motion  Cardiovascular: Normal rate, regular rhythm and intact distal pulses  Pulmonary/Chest: Effort normal  No accessory muscle usage  Tachypnea (With exertion) noted  No respiratory distress  He has decreased breath sounds in the right lower field and the left lower field  He has wheezes in the left upper field and the left lower field  He has no rhonchi  He has no rales  Abdominal: Soft  Bowel sounds are normal  He exhibits no distension  There is no tenderness  Obese   Musculoskeletal: Normal range of motion  He exhibits no edema or tenderness  Neurological: He is alert and oriented to person, place, and time  Skin: Skin is warm and dry  Capillary refill takes less than 2 seconds  He is not diaphoretic  Psychiatric: He has a normal mood and affect  Judgment normal    Nursing note and vitals reviewed        Additional Data:     Labs:    Results from last 7 days   Lab Units 20  0602  20  0522   WBC Thousand/uL 10 71*   < > 10 51*   HEMOGLOBIN g/dL 12 6   < > 13 2 HEMATOCRIT % 37 6   < > 39 7   PLATELETS Thousands/uL 268   < > 193   NEUTROS PCT %  --   --  62   LYMPHS PCT %  --   --  29   LYMPHO PCT % 32   < >  --    MONOS PCT %  --   --  6   MONO PCT % 8   < >  --    EOS PCT % 0   < > 1    < > = values in this interval not displayed  Results from last 7 days   Lab Units 02/07/20  0602  02/02/20  1904   POTASSIUM mmol/L 4 0   < > 4 0   CHLORIDE mmol/L 101   < > 92*   CO2 mmol/L 29   < > 25   BUN mg/dL 13   < > 24   CREATININE mg/dL 0 81   < > 1 25   CALCIUM mg/dL 8 4   < > 9 4   ALK PHOS U/L  --   --  70   ALT U/L  --   --  23   AST U/L  --   --  20    < > = values in this interval not displayed  Results from last 7 days   Lab Units 02/02/20  1904   INR  1 08       * I Have Reviewed All Lab Data Listed Above  * Additional Pertinent Lab Tests Reviewed: All Labs Within Last 24 Hours Reviewed    Imaging:    Imaging Reports Reviewed Today Include: CXR, CT C/A/P, repeat chest x-ray  Imaging Personally Reviewed by Myself Includes:  None    Recent Cultures (last 7 days):     Results from last 7 days   Lab Units 02/06/20 2022 02/06/20  1324 02/06/20  1323 02/03/20  1046 02/03/20  0619 02/02/20  1908   BLOOD CULTURE   --  No Growth at 24 hrs  No Growth at 24 hrs   --   --  No Growth After 4 Days  Staphylococcus coagulase negative*   SPUTUM CULTURE  No growth  --   --  1+ Growth of   Commensal respiratory bala only; No significant growth of Staph aureus/MRSA or Pseudomonas aeruginosa    --   --    GRAM STAIN RESULT  1+ Epithelial cells per low power field*  1+ Polys*  1+ Gram positive cocci in pairs*  --   --  1+ Polys  1+ Mononuclear Cells  No organisms seen  --  Gram positive cocci in clusters*   LEGIONELLA URINARY ANTIGEN   --   --   --   --  Negative  --        Last 24 Hours Medication List:     Current Facility-Administered Medications:  acetaminophen 650 mg Oral Q6H PRN MANAS Yoder    acetylcysteine 3 mL Nebulization TID Andrey Valle PA-C ALPRAZolam 2 mg Oral HS Cuiyiaquiles Yurik, CRNP    atorvastatin 40 mg Oral Daily With Nelson & Tarun, CRNP    azithromycin 500 mg Oral Q24H Edrie Filter, CRNP    busPIRone 15 mg Oral BID Cuiyiaquiles Yurik, CRNP    cefTRIAXone 2,000 mg Intravenous Q24H Cuikrishna Yurik, CRNP Last Rate: 2,000 mg (02/06/20 1946)   digoxin 250 mcg Oral Daily Cuikrishna Yurik, CRNP    ergocalciferol 50,000 Units Oral Weekly Cuikrishna Yurik, CRNP    fluticasone-vilanterol 1 puff Inhalation Daily Cuikrishna Batista, CRNP    guaiFENesin 1,200 mg Oral BID Nicky Marin PA-C    insulin glargine 50 Units Subcutaneous HS Edrie Filter, CRNP    insulin lispro 1-5 Units Subcutaneous TID AC Cuikrishna Yurik, CRNP    insulin lispro 1-5 Units Subcutaneous HS iaquiles Yurik, CRNP    insulin lispro 20 Units Subcutaneous TID With Meals Cuaustyn Yumicaela, CRNP    ipratropium 0 5 mg Nebulization TID [facility]    levalbuterol 0 63 mg Nebulization Q4H PRN [facility], [facility]    levalbuterol 1 25 mg Nebulization TID [facility], CRNP    lidocaine 1 patch Topical Daily Cuiyiaquiles Yumicaela, CRNP    losartan 50 mg Oral BID Cuiyiaquiles Yumicaela, CRNP    metoprolol succinate 200 mg Oral Daily Cuiyiaquiles Yumicaela, CRNP    ondansetron 4 mg Intravenous Q6H PRN [facility], CRNP    pantoprazole 40 mg Oral HS [facility], [facility]    pregabalin 50 mg Oral TID [facility], [facility]NP    QUEtiapine 200 mg Oral QAM iyiaquiles Yumicaela, CRNP    QUEtiapine 400 mg Oral HS iaquiles Yurik, CRNP    saccharomyces boulardii 250 mg Oral BID Cuyiaquiles Yumicaela, CRNP    sertraline 50 mg Oral HS [facility], CRNP    sodium chloride (PF) 3 mL Intravenous PRN MANAS Velasquez         Today, Patient Was Seen By: MANAS Kenny    ** Please Note: Dictation voice to text software may have been used in the creation of this document   **

## 2020-02-07 NOTE — TELEPHONE ENCOUNTER
Patient is in the hospital Dr Lucio Mayorga put in a order for Infusion center for Monday  They would like to no if it will be cancel

## 2020-02-07 NOTE — PLAN OF CARE
Problem: Potential for Falls  Goal: Patient will remain free of falls  Description  INTERVENTIONS:  - Assess patient frequently for physical needs  -  Identify cognitive and physical deficits and behaviors that affect risk of falls    -  [place] fall precautions as indicated by assessment   - Educate patient/family on patient safety including physical limitations  - Instruct patient to call for assistance with activity based on assessment  - Modify environment to reduce risk of injury  - Consider OT/PT consult to assist with strengthening/mobility  Outcome: Progressing     Problem: Prexisting or High Potential for Compromised Skin Integrity  Goal: Skin integrity is maintained or improved  Description  INTERVENTIONS:  - Identify patients at risk for skin breakdown  - Assess and monitor skin integrity  - Assess and monitor nutrition and hydration status  - Monitor labs   - Assist with mobility/ambulation  - Relieve pressure over bony prominences  - Avoid friction and shearing  - Provide appropriate hygiene as needed including keeping skin clean and dry  - Evaluate need for skin moisturizer/barrier cream  - Collaborate with interdisciplinary team   - Patient/family teaching  - Consider wound care consult    Outcome: Progressing     Problem: RESPIRATORY - ADULT  Goal: Achieves optimal ventilation and oxygenation  Description  INTERVENTIONS:  - Assess for changes in respiratory status  - Assess for changes in mentation and behavior  - Position to facilitate oxygenation and minimize respiratory effort  - Oxygen administered by appropriate delivery if ordered  Intiate bi-pap  Intiate bronchodilator therapy  Q 6 hours  - Encourage broncho-pulmonary hygiene including cough, deep breathe, Incentive Spirometry  - Assess the need for suctioning and aspirate as needed  - Assess and instruct to report SOB or any respiratory difficulty  - Respiratory Therapy support as indicated    Outcome: Progressing

## 2020-02-07 NOTE — TELEPHONE ENCOUNTER
Called [facility] and advised to cancel infusion scheduled on 2/10    Asked to please update in [place] to cancel

## 2020-02-07 NOTE — PHYSICAL THERAPY NOTE
PT TREATMENT     02/07/20 1326   Pain Assessment   Pain Assessment No/denies pain   Restrictions/Precautions   Other Precautions O2;Fall Risk   General   Chart Reviewed Yes   Family/Caregiver Present No   Subjective   Subjective "I get tired easily "   Transfers   Sit to Stand 5  Supervision   Stand to Sit 5  Supervision   Toilet transfer 5  Supervision   Additional items   (pt independent with hygiene after BM)   Ambulation/Elevation   Gait pattern   (slow)   Gait Assistance 5  Supervision   Assistive Device Rolling walker  (O2)   Distance 50 feet with change in direction;rest;10 feet to and from bathroom   Balance   Static Sitting Good   Static Standing Fair +  (w RW)   Dynamic Standing Fair  (w R)   Ambulatory Fair  (w RW)   Activity Tolerance   Activity Tolerance Patient limited by fatigue   Exercises   Hip Flexion 5 reps;Bilateral;Sitting   Knee AROM Long Arc Quad 5 reps;Bilateral;Sitting   Ankle Pumps 5 reps;Bilateral;Sitting   Assessment   Assessment Pt is making steady progess with trans, amb with RW, gait endurance and functional mobility  Pt will cont to benefit from skilled PT services to increase pt's strength, endurance and mobility  Plan   Treatment/Interventions ADL retraining;Functional transfer training;LE strengthening/ROM; Therapeutic exercise; Endurance training;Patient/family training;Equipment eval/education; Bed mobility;Gait training; Compensatory technique education   PT Frequency 5x/wk   Recommendation   Recommendation Home PT   Equipment Recommended   (pt has a rollator)   Licensure   [place] License Number  [address] [place] [address] [id number]

## 2020-02-07 NOTE — PLAN OF CARE
Problem: Potential for Falls  Goal: Patient will remain free of falls  Description  INTERVENTIONS:  - Assess patient frequently for physical needs  -  Identify cognitive and physical deficits and behaviors that affect risk of falls    -  [place] fall precautions as indicated by assessment   - Educate patient/family on patient safety including physical limitations  - Instruct patient to call for assistance with activity based on assessment  - Modify environment to reduce risk of injury  - Consider OT/PT consult to assist with strengthening/mobility  Outcome: Progressing     Problem: Prexisting or High Potential for Compromised Skin Integrity  Goal: Skin integrity is maintained or improved  Description  INTERVENTIONS:  - Identify patients at risk for skin breakdown  - Assess and monitor skin integrity  - Assess and monitor nutrition and hydration status  - Monitor labs   - Assist with mobility/ambulation  - Relieve pressure over bony prominences  - Avoid friction and shearing  - Provide appropriate hygiene as needed including keeping skin clean and dry  - Evaluate need for skin moisturizer/barrier cream  - Collaborate with interdisciplinary team   - Patient/family teaching  - Consider wound care consult    Outcome: Progressing     Problem: RESPIRATORY - ADULT  Goal: Achieves optimal ventilation and oxygenation  Description  INTERVENTIONS:  - Assess for changes in respiratory status  - Assess for changes in mentation and behavior  - Position to facilitate oxygenation and minimize respiratory effort  - Oxygen administered by appropriate delivery if ordered  Intiate bi-pap  Intiate bronchodilator therapy  Q 6 hours  - Encourage broncho-pulmonary hygiene including cough, deep breathe, Incentive Spirometry  - Assess the need for suctioning and aspirate as needed  - Assess and instruct to report SOB or any respiratory difficulty  - Respiratory Therapy support as indicated    Outcome: Progressing

## 2020-02-08 PROBLEM — I50.22 CHRONIC SYSTOLIC HEART FAILURE (HCC): Status: ACTIVE | Noted: 2020-02-03

## 2020-02-08 LAB
ANION GAP SERPL CALCULATED.3IONS-SCNC: 6 MMOL/L (ref 4–13)
BACTERIA SPT RESP CULT: ABNORMAL
BACTERIA SPT RESP CULT: ABNORMAL
BUN SERPL-MCNC: 11 MG/DL (ref 5–25)
CALCIUM SERPL-MCNC: 8.4 MG/DL (ref 8.3–10.1)
CHLORIDE SERPL-SCNC: 100 MMOL/L (ref 100–108)
CO2 SERPL-SCNC: 29 MMOL/L (ref 21–32)
CREAT SERPL-MCNC: 0.82 MG/DL (ref 0.6–1.3)
ERYTHROCYTE [DISTWIDTH] IN BLOOD BY AUTOMATED COUNT: 13 % (ref 11.6–15.1)
GFR SERPL CREATININE-BSD FRML MDRD: 96 ML/MIN/1.73SQ M
GLUCOSE SERPL-MCNC: 179 MG/DL (ref 65–140)
GLUCOSE SERPL-MCNC: 186 MG/DL (ref 65–140)
GLUCOSE SERPL-MCNC: 191 MG/DL (ref 65–140)
GLUCOSE SERPL-MCNC: 218 MG/DL (ref 65–140)
GLUCOSE SERPL-MCNC: 274 MG/DL (ref 65–140)
GRAM STN SPEC: ABNORMAL
HCT VFR BLD AUTO: 37.9 % (ref 36.5–49.3)
HGB BLD-MCNC: 12.4 G/DL (ref 12–17)
MAGNESIUM SERPL-MCNC: 1.8 MG/DL (ref 1.6–2.6)
MCH RBC QN AUTO: 31.7 PG (ref 26.8–34.3)
MCHC RBC AUTO-ENTMCNC: 32.7 G/DL (ref 31.4–37.4)
MCV RBC AUTO: 97 FL (ref 82–98)
NRBC BLD AUTO-RTO: 0 /100 WBCS
PLATELET # BLD AUTO: 266 THOUSANDS/UL (ref 149–390)
PLATELET BLD QL SMEAR: ADEQUATE
PMV BLD AUTO: 9.3 FL (ref 8.9–12.7)
POTASSIUM SERPL-SCNC: 4.2 MMOL/L (ref 3.5–5.3)
RBC # BLD AUTO: 3.91 MILLION/UL (ref 3.88–5.62)
RBC MORPH BLD: NORMAL
SMUDGE CELLS BLD QL SMEAR: PRESENT
SODIUM SERPL-SCNC: 135 MMOL/L (ref 136–145)
TOXIC GRANULES BLD QL SMEAR: PRESENT
WBC # BLD AUTO: 11.94 THOUSAND/UL (ref 4.31–10.16)

## 2020-02-08 PROCEDURE — 99232 SBSQ HOSP IP/OBS MODERATE 35: CPT | Performed by: STUDENT IN AN ORGANIZED HEALTH CARE EDUCATION/TRAINING PROGRAM

## 2020-02-08 PROCEDURE — 80048 BASIC METABOLIC PNL TOTAL CA: CPT | Performed by: NURSE PRACTITIONER

## 2020-02-08 PROCEDURE — 94760 N-INVAS EAR/PLS OXIMETRY 1: CPT

## 2020-02-08 PROCEDURE — 85007 BL SMEAR W/DIFF WBC COUNT: CPT | Performed by: NURSE PRACTITIONER

## 2020-02-08 PROCEDURE — 99231 SBSQ HOSP IP/OBS SF/LOW 25: CPT | Performed by: INTERNAL MEDICINE

## 2020-02-08 PROCEDURE — 97110 THERAPEUTIC EXERCISES: CPT

## 2020-02-08 PROCEDURE — 94640 AIRWAY INHALATION TREATMENT: CPT

## 2020-02-08 PROCEDURE — 85027 COMPLETE CBC AUTOMATED: CPT | Performed by: NURSE PRACTITIONER

## 2020-02-08 PROCEDURE — 97535 SELF CARE MNGMENT TRAINING: CPT

## 2020-02-08 PROCEDURE — 82948 REAGENT STRIP/BLOOD GLUCOSE: CPT

## 2020-02-08 PROCEDURE — 83735 ASSAY OF MAGNESIUM: CPT | Performed by: NURSE PRACTITIONER

## 2020-02-08 RX ADMIN — Medication 250 MG: at 17:11

## 2020-02-08 RX ADMIN — INSULIN LISPRO 1 UNITS: 100 INJECTION, SOLUTION INTRAVENOUS; SUBCUTANEOUS at 17:11

## 2020-02-08 RX ADMIN — ALPRAZOLAM 2 MG: 0.5 TABLET ORAL at 21:07

## 2020-02-08 RX ADMIN — ACETYLCYSTEINE 600 MG: 200 SOLUTION ORAL; RESPIRATORY (INHALATION) at 19:58

## 2020-02-08 RX ADMIN — INSULIN LISPRO 20 UNITS: 100 INJECTION, SOLUTION INTRAVENOUS; SUBCUTANEOUS at 12:14

## 2020-02-08 RX ADMIN — INSULIN LISPRO 1 UNITS: 100 INJECTION, SOLUTION INTRAVENOUS; SUBCUTANEOUS at 21:21

## 2020-02-08 RX ADMIN — IPRATROPIUM BROMIDE 0.5 MG: 0.5 SOLUTION RESPIRATORY (INHALATION) at 07:42

## 2020-02-08 RX ADMIN — CEFTRIAXONE 2000 MG: 2 INJECTION, SOLUTION INTRAVENOUS at 20:56

## 2020-02-08 RX ADMIN — INSULIN GLARGINE 50 UNITS: 100 INJECTION, SOLUTION SUBCUTANEOUS at 21:07

## 2020-02-08 RX ADMIN — AZITHROMYCIN MONOHYDRATE 500 MG: 250 TABLET ORAL at 12:15

## 2020-02-08 RX ADMIN — FLUTICASONE FUROATE AND VILANTEROL TRIFENATATE 1 PUFF: 100; 25 POWDER RESPIRATORY (INHALATION) at 09:02

## 2020-02-08 RX ADMIN — ACETAMINOPHEN 650 MG: 325 TABLET, FILM COATED ORAL at 21:06

## 2020-02-08 RX ADMIN — METOPROLOL SUCCINATE 200 MG: 100 TABLET, EXTENDED RELEASE ORAL at 09:04

## 2020-02-08 RX ADMIN — GUAIFENESIN 1200 MG: 600 TABLET, EXTENDED RELEASE ORAL at 09:02

## 2020-02-08 RX ADMIN — PANTOPRAZOLE SODIUM 40 MG: 40 TABLET, DELAYED RELEASE ORAL at 21:07

## 2020-02-08 RX ADMIN — ACETYLCYSTEINE 600 MG: 200 SOLUTION ORAL; RESPIRATORY (INHALATION) at 14:25

## 2020-02-08 RX ADMIN — QUETIAPINE FUMARATE 400 MG: 100 TABLET ORAL at 21:07

## 2020-02-08 RX ADMIN — SERTRALINE HYDROCHLORIDE 50 MG: 50 TABLET ORAL at 21:19

## 2020-02-08 RX ADMIN — BUSPIRONE HYDROCHLORIDE 15 MG: 10 TABLET ORAL at 17:10

## 2020-02-08 RX ADMIN — QUETIAPINE FUMARATE 200 MG: 200 TABLET, EXTENDED RELEASE ORAL at 09:04

## 2020-02-08 RX ADMIN — INSULIN LISPRO 20 UNITS: 100 INJECTION, SOLUTION INTRAVENOUS; SUBCUTANEOUS at 09:03

## 2020-02-08 RX ADMIN — PREGABALIN 50 MG: 50 CAPSULE ORAL at 21:07

## 2020-02-08 RX ADMIN — GUAIFENESIN 1200 MG: 600 TABLET, EXTENDED RELEASE ORAL at 17:13

## 2020-02-08 RX ADMIN — LOSARTAN POTASSIUM 50 MG: 50 TABLET, FILM COATED ORAL at 09:03

## 2020-02-08 RX ADMIN — BUSPIRONE HYDROCHLORIDE 15 MG: 10 TABLET ORAL at 09:01

## 2020-02-08 RX ADMIN — INSULIN LISPRO 2 UNITS: 100 INJECTION, SOLUTION INTRAVENOUS; SUBCUTANEOUS at 12:14

## 2020-02-08 RX ADMIN — IPRATROPIUM BROMIDE 0.5 MG: 0.5 SOLUTION RESPIRATORY (INHALATION) at 14:25

## 2020-02-08 RX ADMIN — LEVALBUTEROL HYDROCHLORIDE 1.25 MG: 1.25 SOLUTION, CONCENTRATE RESPIRATORY (INHALATION) at 19:58

## 2020-02-08 RX ADMIN — LEVALBUTEROL HYDROCHLORIDE 1.25 MG: 1.25 SOLUTION, CONCENTRATE RESPIRATORY (INHALATION) at 14:25

## 2020-02-08 RX ADMIN — IPRATROPIUM BROMIDE 0.5 MG: 0.5 SOLUTION RESPIRATORY (INHALATION) at 19:59

## 2020-02-08 RX ADMIN — LEVALBUTEROL HYDROCHLORIDE 1.25 MG: 1.25 SOLUTION, CONCENTRATE RESPIRATORY (INHALATION) at 07:42

## 2020-02-08 RX ADMIN — ATORVASTATIN CALCIUM 40 MG: 40 TABLET, FILM COATED ORAL at 17:10

## 2020-02-08 RX ADMIN — PREGABALIN 50 MG: 50 CAPSULE ORAL at 09:04

## 2020-02-08 RX ADMIN — INSULIN LISPRO 20 UNITS: 100 INJECTION, SOLUTION INTRAVENOUS; SUBCUTANEOUS at 17:10

## 2020-02-08 RX ADMIN — PREGABALIN 50 MG: 50 CAPSULE ORAL at 17:11

## 2020-02-08 RX ADMIN — INSULIN LISPRO 1 UNITS: 100 INJECTION, SOLUTION INTRAVENOUS; SUBCUTANEOUS at 09:03

## 2020-02-08 RX ADMIN — ACETYLCYSTEINE 600 MG: 200 SOLUTION ORAL; RESPIRATORY (INHALATION) at 07:41

## 2020-02-08 RX ADMIN — Medication 250 MG: at 09:04

## 2020-02-08 RX ADMIN — DIGOXIN 250 MCG: 125 TABLET ORAL at 09:02

## 2020-02-08 NOTE — PROGRESS NOTES
Progress Note Vicky Singleton 1960, 60 y o  male MRN: [id number]    Unit/Bed#: 2 [place] [address] Encounter: [id number]    Primary Care Provider: Kaden Salas MD   Date and time admitted to hospital: 2/2/2020  6:55 PM        Severe sepsis [facility])  Assessment & Plan  POA  A/e/b fever 102 1, tachycardia, leukocytosis, with source of infection RLL PNA  Lactic acid 2 7  Normalized after receiving normal saline 1000 mL in ED  · Initial Blood cultures: one of two sets + for gram + cocci; suspect contamination  · Repeat set of blood cultures: no growth at 48 hrs  · Sputum cx: mixed resp bala  · MRSA screen negative  · Procal 4 -> 0 4  · Monitor temps and WBC    Community acquired pneumonia  Assessment & Plan  Patient presents with fever, cough, SOB  CT chest - Right basilar consolidation with air bronchograms  Left basilar relaxation atelectasis  Flu/RSV negative  · Continue Rocephin 2 gm IV daily (weight based), day #8  · course of Azithromycin for right axillary abscess coverage, day #5/5- DC now  · Continue Atrovent/Xopenex TID, Xopenex PRN, Mucinex  · Patient needs aggressive mucus clearance with acapella valve + IS 10x/hr while awake    · Pulmonary following, appreciate input  · Repeat CXRs have shown persistent PNA  · Continues with hemoptysis  Holding Eliquis and aspirin patient  Plan for bronchoscopy tomorrow    Chronic systolic heart failure [facility])  Assessment & Plan  Patient with elevated ProBNP 820 on admission  Chest x-ray no pulmonary edema  CT chest - Small right and trace left pleural effusions  Cardiomegaly and left atrial enlargement  Trace pericardial effusion  Nuclear stress test in 1/2020 showed normal LV EF  2D echo shows EF of 40-45%, no regional wall motion abnormalities  Mild diffuse hypokinesis, mild concentric hypertrophy and mild mitral valve regurgitation  No significant changes from previous study performed August 2019 as per Cardiology report    · CXR with bilateral small effusions   · Daily weight and I&Os  · Continue to monitor     Chronic a-fib  Assessment & Plan  Patient presented with uncontrolled AFib  EKG on admission AFib, heart rate 125  Repeat EKG during admission with rate 80s  · Heart rate improved with home medications, continue Toprol  mg daily and digoxin 250 mcg daily  · Tele - controlled AFib  Discontinued tele  · Monitor electrolytes, goal potassium > 4 and magnesium > 2  · Holding  Eliquis given continued hemoptysis  · Patient with palpitations this morning, resolved  EKG shows Afib 80s    COPD (chronic obstructive pulmonary disease) ([facility] [address] )  Assessment & Plan  Patient was already on steroid taper 30 -20 -10 mg PO daily for 3 days by his pulmonologist's office last week  · Prednisone discontinued  · Continue Trelegy inhaler (substitute with Breo + atrovent neb)  · Appreciate pulmonology consultation and recommendations  Hyponatremia  Assessment & Plan  Sodium 130 on admission  Acute on chronic  Baseline sodium appears to be 134-135, likely due to psych medications + PNA  Na stable now at baseline, monitor    Stage 2 chronic kidney disease  Assessment & Plan  Baseline creatinine 0 9-1 2s  · Creatinine stable 0 84  · Monitor renal function, avoid hypoTN    Obesity (BMI 30-39  9)  Assessment & Plan  Body mass index is 39 95 kg/m²  Diet and lifestyle modification    SHAVONNE treated with BiPAP  Assessment & Plan  Patient changed to auto BiPAP at HS with improved tolerance  Dyslipidemia  Assessment & Plan  Continue Lipitor  Therapeutic lifestyle changes  Cardiac diet      Type 2 diabetes mellitus with complication, with long-term current use of insulin [facility])  Assessment & Plan  Lab Results   Component Value Date    HGBA1C 8 3 (H) 01/06/2020       Recent Labs     02/08/20  1058 02/08/20  1620 02/08/20  2108 02/09/20  0712   POCGLU 274* 186* 179* 162*       Blood Sugar Average: Last 72 hrs:  (P) 195 375     Suboptimal sugar control  · Holding metformin during admission  · Decreased Lantus to 50 units at HS (takes 55 units at home)  · Continue mealtime insulin 20 units TID  · Diabetic diet  · Patient follows endocrine Dr Georgina Brito as outpatient    Diabetic neuropathy [facility])  Assessment & Plan  · Continue Lyrica    Chronic reflux esophagitis  Assessment & Plan  Continue PPI    Benign essential hypertension  Assessment & Plan  BP stable  · Continue losartan, metoprolol with holding parameter    Coronary artery disease involving native coronary artery of native heart without angina pectoris  Assessment & Plan  Nuclear stress test 1/2020 was normal   Normal LV systolic function on nuclear stress test   · Continue Metoprolol, Lipitor  · Holding ASA given hemoptysis     Bipolar affective disorder ([facility] [address] )  Assessment & Plan  · Continue Xanax, BuSpar, Seroquel, Zoloft  · Supportive care  * Acute on chronic respiratory failure with hypoxia [facility])  Assessment & Plan  Likely secondary to PNA, atelectasis  · Patient uses oxygen 2 L NC continuous, BiPAP 12/5 w/ 2 L at HS for SHAVONNE  · Continue bronchodilators, Mucomyst, aggressive pulmonary hygiene  · BiPAP transitioned to auto BiPAP - patient tolerating  · Will need home O2 eval prior to discharge  VTE Pharmacologic Prophylaxis:   Pharmacologic: holding eliquis due to hemoptysis  Mechanical VTE Prophylaxis in Place: Yes    Patient Centered Rounds: I have performed bedside rounds with nursing staff today  Discussions with Specialists or Other Care Team Provider: Yes  Education and Discussions with Family / Patient:Yes  Time Spent for Care: 45 minutes  More than 50% of total time spent on counseling and coordination of care as described above  Current Length of Stay: 7 day(s)  Current Patient Status: Inpatient     Discharge Plan: pending    Code Status: Level 1 - Full Code      Subjective: This morning patient complained of palpitations, which felt like his usual Afib, resolved on its own after a minute or two   EKG showed rate controlled Afib  Hes still have hemoptysis which is unchanged from yesterday, hes aware of plan for bronchoscopy tomorrow  Objective:     Vitals:   Temp (24hrs), Av 9 °F (36 6 °C), Min:97 4 °F (36 3 °C), Max:98 6 °F (37 °C)    Temp:  [97 4 °F (36 3 °C)-98 6 °F (37 °C)] 97 4 °F (36 3 °C)  HR:  [68-87] 87  Resp:  [17-19] 18  BP: (100-148)/(67-86) 130/86  SpO2:  [91 %-98 %] 91 %  Body mass index is 39 89 kg/m²  Input and Output Summary (last 24 hours): Intake/Output Summary (Last 24 hours) at 2020 0851  Last data filed at 2020 2125  Gross per 24 hour   Intake 5260 ml   Output 1400 ml   Net 3860 ml        Physical Exam:     Physical Exam   Constitutional: He is oriented to person, place, and time  He appears well-developed  No distress  HENT:   Head: Normocephalic and atraumatic  Cardiovascular: Normal rate and normal heart sounds  No murmur heard  irregular   Pulmonary/Chest: Effort normal and breath sounds normal  No respiratory distress  He has no wheezes  He has no rales  Clear but decreased   Abdominal: Soft  Bowel sounds are normal  He exhibits no distension  There is no tenderness  There is no rebound  Musculoskeletal: He exhibits edema (trace)  Neurological: He is alert and oriented to person, place, and time  Skin: Skin is warm and dry  Psychiatric: He has a normal mood and affect  His behavior is normal    Nursing note and vitals reviewed  Additional Data:     Labs:    Results from last 7 days   Lab Units 20  0515 20  0602 20  0511  20  0522 20  0530 20  1904   WBC Thousand/uL 11 94* 10 71* 7 74   < > 10 51* 15 29* 17 36*   HEMOGLOBIN g/dL 12 4 12 6 12 9   < > 13 2 10 9* 12 9   HEMATOCRIT % 37 9 37 6 39 3   < > 39 7 32 8* 37 6   PLATELETS Thousands/uL 266 268 243   < > 193 136* 152   NEUTROS PCT %  --   --   --   --  62 77* 79*    < > = values in this interval not displayed       Results from last 7 days   Lab Units 02/08/20  0515 02/07/20  0602 02/06/20  0511  02/02/20  1904   SODIUM mmol/L 135* 136 140   < > 130*   POTASSIUM mmol/L 4 2 4 0 3 6   < > 4 0   CHLORIDE mmol/L 100 101 101   < > 92*   CO2 mmol/L 29 29 32   < > 25   BUN mg/dL 11 13 16   < > 24   CREATININE mg/dL 0 82 0 81 0 84   < > 1 25   CALCIUM mg/dL 8 4 8 4 9 5   < > 9 4   TOTAL BILIRUBIN mg/dL  --   --   --   --  0 90   ALK PHOS U/L  --   --   --   --  70   ALT U/L  --   --   --   --  23   AST U/L  --   --   --   --  20    < > = values in this interval not displayed  Results from last 7 days   Lab Units 02/02/20  1904   INR  1 08     Results from last 7 days   Lab Units 02/02/20  1904   TROPONIN I ng/mL <0 02     Lab Results   Component Value Date/Time    HGBA1C 8 3 (H) 01/06/2020 07:53 AM    HGBA1C 8 0 07/21/2017 09:27 AM     Results from last 7 days   Lab Units 02/09/20  0712 02/08/20  2108 02/08/20  1620 02/08/20  1058 02/08/20  0709 02/07/20  2056 02/07/20  1650 02/07/20  1630 02/07/20  1129 02/07/20  0744 02/06/20  2117 02/06/20  1611   POC GLUCOSE mg/dl 162* 179* 186* 274* 191* 107 172* 172* 184* 181* 195* 235*     Results from last 7 days   Lab Units 02/07/20  0602 02/06/20  1315 02/06/20  0511 02/04/20  0522 02/03/20  0530 02/02/20 2053 02/02/20  1909   LACTIC ACID mmol/L  --   --   --   --   --  1 3 2 7*   PROCALCITONIN ng/ml 0 40* 0 51* 0 74* 3 00* 4 89*  --   --        * I Have Reviewed All Lab Data Listed Above  * Additional Pertinent Lab Tests Reviewed: All Labs Within Last 24 Hours Reviewed    Imaging:     XR chest portable   Final Result by Roselyn Metcalf MD (02/07 2989)      Persistent right base pneumonia  Workstation performed: [id number]         XR chest portable   Final Result by Zoraida Gimenez MD (02/06 1159)      Persistent right lower lobe consolidation/atelectasis with suspected small effusions                 Workstation performed: [id number]         XR chest pa & lateral   Final Result by Ernesto Mann MD (02/06 4355)      Bilateral pleural effusions  Consolidative changes at the right lower lobe, consistent with atelectasis versus infiltrate  This finding is more conspicuous when comparing to chest radiograph of 2/2/2020            Workstation performed: [id number]         CT chest abdomen pelvis w contrast   Final Result by Olivia Ponce MD (02/02 2236)      Right basilar consolidation with air bronchograms  Left basilar relaxation atelectasis  Small right and trace left pleural effusions         Cardiomegaly and left atrial enlargement  Trace pericardial effusion  Workstation performed: [id number]         XR chest portable   Final Result by Franci Mendez MD (02/02 1945)      No acute cardiopulmonary disease  Stable cardiomegaly  Workstation performed: [id number]           Imaging Reports Reviewed by myself    Cultures:   Blood Culture:   Lab Results   Component Value Date    BLOODCX No Growth at 48 hrs  02/06/2020    BLOODCX No Growth at 48 hrs  02/06/2020    BLOODCX No Growth After 5 Days  02/02/2020    BLOODCX Staphylococcus coagulase negative (A) 02/02/2020    BLOODCX No Growth After 5 Days  08/06/2019    BLOODCX No Growth After 5 Days   08/06/2019     Urine Culture:   Lab Results   Component Value Date    URINECX 0472-5497 cfu/ml Mixed Contaminants X2 03/20/2017    URINECX No Growth <1000 cfu/mL 11/27/2016    URINECX No Growth <1000 cfu/mL 09/02/2016     Sputum Culture: No components found for: SPUTUMCX  Wound Culture: No results found for: WOUNDCULT    Last 24 Hours Medication List:     Current Facility-Administered Medications:  acetaminophen 650 mg Oral Q6H PRN MANAS Yoder    acetylcysteine 3 mL Nebulization TID Carline Demarco PA-C    ALPRAZolam 2 mg Oral HS MANAS Yoder    atorvastatin 40 mg Oral Daily With MANAS Arnold    azithromycin 500 mg Oral Q24H MANAS Venegas    busPIRone 15 mg Oral BID MANAS Yoder    cefTRIAXone 2,000 mg Intravenous Q24H Breonna Batista, CRNP Last Rate: 2,000 mg (02/08/20 2056)   digoxin 250 mcg Oral Daily Cuikrishna Batista, CRNP    ergocalciferol 50,000 Units Oral Weekly Cuikrishna Menjivarriasher, CRNP    fluticasone-vilanterol 1 puff Inhalation Daily Cuikrishna Menjivarriasher, CRNP    guaiFENesin 1,200 mg Oral BID Carline Demarco PA-C    insulin glargine 50 Units Subcutaneous HS Duamos Héctor, CRNP    insulin lispro 1-5 Units Subcutaneous TID AC Cuikrishna Tiarariasher, CRNP    insulin lispro 1-5 Units Subcutaneous HS Cuiaquiles Tiararik, CRNP    insulin lispro 20 Units Subcutaneous TID With Meals Cuaustyn Batista, CRNP    ipratropium 0 5 mg Nebulization TID Cuikrishna Yumicaela, CRNP    levalbuterol 0 63 mg Nebulization Q4H PRN Cuikrishna Menjivarriasher, CRNP    levalbuterol 1 25 mg Nebulization TID Cuiaquiles Batista, CRNP    lidocaine 1 patch Topical Daily Cuiyiaquiles Yuriasher, CRNP    losartan 50 mg Oral BID Cuiyiaquiles Menjivarrik, CRNP    metoprolol succinate 200 mg Oral Daily Cuiyiaquiles Yurik, CRNP    ondansetron 4 mg Intravenous Q6H PRN Cuiaquiles Yuriasher, CRNP    pantoprazole 40 mg Oral HS Cuiaquiles Yurik, CRNP    pregabalin 50 mg Oral TID Cuiaquiles Menjivarrik, CRNP    QUEtiapine 200 mg Oral QAM Cuiyiaquiles Yurik, CRNP    QUEtiapine 400 mg Oral HS Cuiyiaquiles Yurik, CRNP    saccharomyces boulardii 250 mg Oral BID Cuiyiaquiles Yurik, CRNP    sertraline 50 mg Oral HS Cuiyiaquiles Yurik, CRNP    sodium chloride (PF) 3 mL Intravenous PRN Breonna Batista, CRNP         Today, Patient Was Seen By: Abdi Duff MD    ** Please Note: Dragon 360 Dictation voice to text software may have been used in the creation of this document   **

## 2020-02-08 NOTE — PLAN OF CARE
Problem: Potential for Falls  Goal: Patient will remain free of falls  Description  INTERVENTIONS:  - Assess patient frequently for physical needs  -  Identify cognitive and physical deficits and behaviors that affect risk of falls    -  [place] fall precautions as indicated by assessment   - Educate patient/family on patient safety including physical limitations  - Instruct patient to call for assistance with activity based on assessment  - Modify environment to reduce risk of injury  - Consider OT/PT consult to assist with strengthening/mobility  Outcome: Progressing     Problem: Prexisting or High Potential for Compromised Skin Integrity  Goal: Skin integrity is maintained or improved  Description  INTERVENTIONS:  - Identify patients at risk for skin breakdown  - Assess and monitor skin integrity  - Assess and monitor nutrition and hydration status  - Monitor labs   - Assist with mobility/ambulation  - Relieve pressure over bony prominences  - Avoid friction and shearing  - Provide appropriate hygiene as needed including keeping skin clean and dry  - Evaluate need for skin moisturizer/barrier cream  - Collaborate with interdisciplinary team   - Patient/family teaching  - Consider wound care consult    Outcome: Progressing     Problem: RESPIRATORY - ADULT  Goal: Achieves optimal ventilation and oxygenation  Description  INTERVENTIONS:  - Assess for changes in respiratory status  - Assess for changes in mentation and behavior  - Position to facilitate oxygenation and minimize respiratory effort  - Oxygen administered by appropriate delivery if ordered  Intiate bi-pap  Intiate bronchodilator therapy  Q 6 hours  - Encourage broncho-pulmonary hygiene including cough, deep breathe, Incentive Spirometry  - Assess the need for suctioning and aspirate as needed  - Assess and instruct to report SOB or any respiratory difficulty  - Respiratory Therapy support as indicated    Outcome: Progressing

## 2020-02-08 NOTE — PROGRESS NOTES
Progress Note Nate Saldana 1960, 60 y o  male MRN: [id number]    Unit/Bed#: 2 [place] [address] Encounter: [id number]    Primary Care Provider: Mireille Kendrick MD   Date and time admitted to hospital: 2/2/2020  6:55 PM        Severe sepsis [facility])  Assessment & Plan  POA  A/e/b fever 102 1, tachycardia, leukocytosis, with source of infection RLL PNA  Lactic acid 2 7  Normalized after receiving normal saline 1000 mL in ED  · Initial Blood cultures: one of two sets + for gram + cocci; suspect contamination  · Repeat set of blood cultures: no growth at 24 hrs  · Sputum cx: mixed resp bala  · MRSA screen negative  · Procal 4 -> 0 4  · Monitor temps and WBC    Community acquired pneumonia  Assessment & Plan  Patient presents with fever, cough, SOB  CT chest - Right basilar consolidation with air bronchograms  Left basilar relaxation atelectasis  Flu/RSV negative  · Continue Rocephin 2 gm IV daily (weight based), day #7  · Resume Azithromycin for right axillary abscess coverage, day #3  · Continue Atrovent/Xopenex TID, Xopenex PRN, Mucinex  · Patient needs aggressive mucus clearance with acapella valve + IS 10x/hr while awake    · Pulmonary following, appreciate input  · Continues with hemoptysis  Discontinue Eliquis and aspirin  , patient may need a bronchoscopy on Monday  · Repeat CXRs have shown persistent PNA    Chronic systolic heart failure [facility])  Assessment & Plan  Patient with elevated ProBNP 820 on admission  Chest x-ray no pulmonary edema  CT chest - Small right and trace left pleural effusions  Cardiomegaly and left atrial enlargement  Trace pericardial effusion  Nuclear stress test in 1/2020 showed normal LV EF  2D echo shows EF of 40-45%, no regional wall motion abnormalities  Mild diffuse hypokinesis, mild concentric hypertrophy and mild mitral valve regurgitation  No significant changes from previous study performed August 2019 as per Cardiology report    · CXR with bilateral small effusions · Daily weight and I&Os  · Continue to monitor     Chronic a-fib  Assessment & Plan  Patient presented with uncontrolled AFib  EKG AFib, heart rate 125  · Heart rate improved with home medications, continue Toprol  mg daily and digoxin 250 mcg daily  · Tele - controlled AFib  Discontinued tele  · Monitor electrolytes, goal potassium > 4 and magnesium > 2  · Holding  Eliquis given continued hemoptysis  COPD (chronic obstructive pulmonary disease) ([facility] [address] )  Assessment & Plan  Patient was already on steroid taper 30 -20 -10 mg PO daily for 3 days by his pulmonologist's office last week  · Prednisone discontinued  · Continue Trelegy inhaler (substitute with Breo + atrovent neb)  · Appreciate pulmonology consultation and recommendations  Hyponatremia  Assessment & Plan  Sodium 130 on admission  Acute on chronic  Baseline sodium appears to be 134-135, likely due to psych medications + PNA  Na stable now at baseline, monitor    Stage 2 chronic kidney disease  Assessment & Plan  Baseline creatinine 0 9-1 2s  · Creatinine stable 0 84  · Monitor renal function, avoid hypoTN    Obesity (BMI 30-39  9)  Assessment & Plan  Body mass index is 39 95 kg/m²  Diet and lifestyle modification    SHAVONNE treated with BiPAP  Assessment & Plan  Patient changed to auto BiPAP at HS with improved tolerance  Dyslipidemia  Assessment & Plan  Continue Lipitor  Therapeutic lifestyle changes  Cardiac diet  Type 2 diabetes mellitus with complication, with long-term current use of insulin [facility])  Assessment & Plan  Lab Results   Component Value Date    HGBA1C 8 3 (H) 01/06/2020       Recent Labs     02/07/20  1650 02/07/20  2056 02/08/20  0709 02/08/20  1058   POCGLU 172* 107 191* 274*       Blood Sugar Average: Last 72 hrs:  (P) 245 4     suboptimal control of diabetes    · Holding metformin during admission  · Decreased Lantus to 50 units at HS (takes 55 units at home)  · Continue mealtime insulin 20 units TID  · Diabetic diet   · Patient follows endocrine Dr Reji Juárez as outpatient  · Patient would benefit from weight loss and therapeutic lifestyle changes  Diabetic neuropathy (HCC)  Assessment & Plan  · Continue Lyrica    Chronic reflux esophagitis  Assessment & Plan  Continue PPI    Benign essential hypertension  Assessment & Plan  BP stable  · Continue losartan, metoprolol with holding parameter    Coronary artery disease involving native coronary artery of native heart without angina pectoris  Assessment & Plan  Nuclear stress test 1/2020 was normal   Normal LV systolic function on nuclear stress test   · Continue Metoprolol, Lipitor  · Holding ASA given hemoptysis     Bipolar affective disorder ([address] )  Assessment & Plan  · Continue Xanax, BuSpar, Seroquel, Zoloft  · Supportive care  * Acute on chronic respiratory failure with hypoxia [facility])  Assessment & Plan  Likely secondary to PNA, atelectasis  · Patient uses oxygen 2 L NC continuous, BiPAP 12/5 w/ 2 L at HS for SHAVONNE  · Continue bronchodilators, Mucomyst, aggressive pulmonary hygiene  · BiPAP transitioned to auto BiPAP -tolerating  · Will need home O2 eval prior to discharge  · Appreciate pulmonary input    Diarrhearesolved as of 2/6/2020  Assessment & Plan  Patient reported diarrhea uncontrollably at [facility] for past couple of days at home prior to admission  Denies antibiotic use in past 3 months  · Patient reports he had a medium sized formed BM yesterday  · Continue probiotic BID        VTE Pharmacologic Prophylaxis:   Pharmacologic: holding eliquis due to hemoptysis  Mechanical VTE Prophylaxis in Place: Yes    Patient Centered Rounds: I have performed bedside rounds with nursing staff today  Discussions with Specialists or Other Care Team Provider: Yes  Education and Discussions with Family / Patient:Yes  Time Spent for Care: 45 minutes  More than 50% of total time spent on counseling and coordination of care as described above      Current Length of Stay: 6 day(s)  Current Patient Status: Inpatient     Discharge Plan: pending    Code Status: Level 1 - Full Code      Subjective:   Patient slept well last night with auto BIPAP, still coughing up blood, which is unchanged from yesterday  SOB still present but a little improved since admission  Objective:     Vitals:   Temp (24hrs), Av 3 °F (36 8 °C), Min:97 8 °F (36 6 °C), Max:99 °F (37 2 °C)    Temp:  [97 8 °F (36 6 °C)-99 °F (37 2 °C)] 97 8 °F (36 6 °C)  HR:  [74-96] 96  Resp:  [18-20] 20  BP: (132-141)/(77-81) 132/81  SpO2:  [93 %-96 %] 93 %  Body mass index is 39 95 kg/m²  Input and Output Summary (last 24 hours): Intake/Output Summary (Last 24 hours) at 2020 1005  Last data filed at 2020 2154  Gross per 24 hour   Intake    Output 775 ml   Net -775 ml        Physical Exam:     Physical Exam   Constitutional: He is oriented to person, place, and time  He appears well-developed  No distress  HENT:   Head: Normocephalic and atraumatic  Cardiovascular: Normal rate, regular rhythm and normal heart sounds  No murmur heard  Pulmonary/Chest: Effort normal and breath sounds normal  No respiratory distress  He has no wheezes  He has no rales  Coarse breath sounds, scattered rhonchi   Abdominal: Soft  Bowel sounds are normal  He exhibits no distension  There is no tenderness  There is no rebound  Neurological: He is alert and oriented to person, place, and time  Skin: Skin is warm and dry  Psychiatric: He has a normal mood and affect  His behavior is normal    Nursing note and vitals reviewed        Additional Data:     Labs:    Results from last 7 days   Lab Units 20  0515 20  0602 20  0511  20  0522 20  0530 20  1904   WBC Thousand/uL 11 94* 10 71* 7 74   < > 10 51* 15 29* 17 36*   HEMOGLOBIN g/dL 12 4 12 6 12 9   < > 13 2 10 9* 12 9   HEMATOCRIT % 37 9 37 6 39 3   < > 39 7 32 8* 37 6   PLATELETS Thousands/uL 266 268 243   < > 193 136* 152   NEUTROS PCT %  --   --   --   --  62 77* 79*    < > = values in this interval not displayed  Results from last 7 days   Lab Units 02/08/20  0515 02/07/20  0602 02/06/20  0511  02/02/20  1904   SODIUM mmol/L 135* 136 140   < > 130*   POTASSIUM mmol/L 4 2 4 0 3 6   < > 4 0   CHLORIDE mmol/L 100 101 101   < > 92*   CO2 mmol/L 29 29 32   < > 25   BUN mg/dL 11 13 16   < > 24   CREATININE mg/dL 0 82 0 81 0 84   < > 1 25   CALCIUM mg/dL 8 4 8 4 9 5   < > 9 4   TOTAL BILIRUBIN mg/dL  --   --   --   --  0 90   ALK PHOS U/L  --   --   --   --  70   ALT U/L  --   --   --   --  23   AST U/L  --   --   --   --  20    < > = values in this interval not displayed  Results from last 7 days   Lab Units 02/02/20  1904   INR  1 08     Results from last 7 days   Lab Units 02/02/20  1904   TROPONIN I ng/mL <0 02     Lab Results   Component Value Date/Time    HGBA1C 8 3 (H) 01/06/2020 07:53 AM    HGBA1C 8 0 07/21/2017 09:27 AM     Results from last 7 days   Lab Units 02/08/20  0709 02/07/20 2056 02/07/20  1650 02/07/20  1630 02/07/20  1129 02/07/20  0744 02/06/20  2117 02/06/20  1611 02/06/20  1129 02/06/20  0755 02/05/20 2034 02/05/20  1620   POC GLUCOSE mg/dl 191* 107 172* 172* 184* 181* 195* 235* 197* 209* 106 200*     Results from last 7 days   Lab Units 02/07/20  0602 02/06/20  1315 02/06/20  0511 02/04/20  0522 02/03/20  0530 02/02/20 2053 02/02/20  1909   LACTIC ACID mmol/L  --   --   --   --   --  1 3 2 7*   PROCALCITONIN ng/ml 0 40* 0 51* 0 74* 3 00* 4 89*  --   --        * I Have Reviewed All Lab Data Listed Above  * Additional Pertinent Lab Tests Reviewed: All Labs Within Last 24 Hours Reviewed    Imaging:     XR chest portable   Final Result by Jaclyn Hoffman MD (02/07 7772)      Persistent right base pneumonia  Workstation performed: [id number]         XR chest portable   Final Result by Franci Mendez MD (02/06 9636)      Persistent right lower lobe consolidation/atelectasis with suspected small effusions  Workstation performed: [id number]         XR chest pa & lateral   Final Result by Linda Grider MD (02/06 5465)      Bilateral pleural effusions  Consolidative changes at the right lower lobe, consistent with atelectasis versus infiltrate  This finding is more conspicuous when comparing to chest radiograph of 2/2/2020            Workstation performed: [id number]         CT chest abdomen pelvis w contrast   Final Result by Josey Decker MD (02/02 2236)      Right basilar consolidation with air bronchograms  Left basilar relaxation atelectasis  Small right and trace left pleural effusions         Cardiomegaly and left atrial enlargement  Trace pericardial effusion  Workstation performed: [id number]         XR chest portable   Final Result by Misha Anderson MD (02/02 1945)      No acute cardiopulmonary disease  Stable cardiomegaly  Workstation performed: [id number]           Imaging Reports Reviewed by myself    Cultures:   Blood Culture:   Lab Results   Component Value Date    BLOODCX No Growth at 24 hrs  02/06/2020    BLOODCX No Growth at 24 hrs  02/06/2020    BLOODCX No Growth After 5 Days  02/02/2020    BLOODCX Staphylococcus coagulase negative (A) 02/02/2020    BLOODCX No Growth After 5 Days  08/06/2019    BLOODCX No Growth After 5 Days   08/06/2019     Urine Culture:   Lab Results   Component Value Date    URINECX 6415-2160 cfu/ml Mixed Contaminants X2 03/20/2017    URINECX No Growth <1000 cfu/mL 11/27/2016    URINECX No Growth <1000 cfu/mL 09/02/2016     Sputum Culture: No components found for: SPUTUMCX  Wound Culture: No results found for: WOUNDCULT    Last 24 Hours Medication List:     Current Facility-Administered Medications:  acetaminophen 650 mg Oral Q6H PRN MANAS Yoder    acetylcysteine 3 mL Nebulization TID Reema Salter PA-C    ALPRAZolam 2 mg Oral HS MANAS Yoder    atorvastatin 40 mg Oral Daily With MANAS Arnold    azithromycin 500 mg Oral Q24H Jez Dawn, CRNP    busPIRone 15 mg Oral BID Breonna Batista, CRNP    cefTRIAXone 2,000 mg Intravenous Q24H Breonna Batista, CRNP Last Rate: 2,000 mg (02/07/20 2056)   digoxin 250 mcg Oral Daily Cuikrishna Batista, CRNP    ergocalciferol 50,000 Units Oral Weekly Cuikrishna Batista, CRNP    fluticasone-vilanterol 1 puff Inhalation Daily Claudiaikrishna Batista, CRNP    guaiFENesin 1,200 mg Oral BID YUE Hanson-C    insulin glargine 50 Units Subcutaneous HS Jez Marmolejoi, CRNP    insulin lispro 1-5 Units Subcutaneous TID AC Cuikrishna Batista, CRNP    insulin lispro 1-5 Units Subcutaneous HS Cuikrishna Batista, CRNP    insulin lispro 20 Units Subcutaneous TID With Meals Breonna Batista, CRNP    ipratropium 0 5 mg Nebulization TID [facility], CRNP    levalbuterol 0 63 mg Nebulization Q4H PRN [facility], CR[facility]    levalbuterol 1 25 mg Nebulization TID [facility], CRNP    lidocaine 1 patch Topical Daily Cuiyiaquiles Yumicaela, CRNP    losartan 50 mg Oral BID Cuiyiaquiles Yuriasher, CRNP    metoprolol succinate 200 mg Oral Daily Cuiyiaquiles Yumicaela, CRNP    ondansetron 4 mg Intravenous Q6H PRN [facility], CRNP    pantoprazole 40 mg Oral HS Cuiyiaquiles Yurik, CRNP    pregabalin 50 mg Oral TID Cuiyiaquiles Yurik, CRNP    QUEtiapine 200 mg Oral QAM Cuiyiaquiles Yurik, CRNP    QUEtiapine 400 mg Oral HS Cuiaquiles Yurik, CRNP    saccharomyces boulardii 250 mg Oral BID Cuiyiaquiles Yurik, CRNP    sertraline 50 mg Oral HS Cuiaquiles Yurik, CRNP    sodium chloride (PF) 3 mL Intravenous PRN Breonna Batista, CRNP         Today, Patient Was Seen By: Sabina Marmolejo MD    ** Please Note: Dragon 360 Dictation voice to text software may have been used in the creation of this document   **

## 2020-02-08 NOTE — ASSESSMENT & PLAN NOTE
Patient presents with fever, cough, SOB  CT chest - Right basilar consolidation with air bronchograms  Left basilar relaxation atelectasis  Flu/RSV negative  · Completed 5 day course of Azithromycin  · Continue Rocephin 2 gm IV daily (weight based), day #9  · Continue Atrovent/Xopenex TID, Xopenex PRN, Mucinex  · Patient needs aggressive mucus clearance with acapella valve + IS 10x/hr while awake    · Pulmonary following, appreciate input  · Repeat CXRs have shown persistent PNA  · Continues with hemoptysis  Plan for bronchoscopy today with pulmonology

## 2020-02-08 NOTE — ASSESSMENT & PLAN NOTE
Patient with elevated ProBNP 820 on admission  Chest x-ray no pulmonary edema  CT chest - Small right and trace left pleural effusions  Cardiomegaly and left atrial enlargement  Trace pericardial effusion  Nuclear stress test in 1/2020 showed normal LV EF  2D echo shows EF of 40-45%, no regional wall motion abnormalities  Mild diffuse hypokinesis, mild concentric hypertrophy and mild mitral valve regurgitation  No significant changes from previous study performed August 2019 as per Cardiology report  · Most recent chest x-ray performed on 02/07 shows persistent right base pneumonia    · Daily weight and I&Os  · Continue to monitor

## 2020-02-08 NOTE — PROGRESS NOTES
Progress Note - Pulmonary   Elaina Bauman 60 y o  male MRN: [id number]  Unit/Bed#: 2 [place] [address] Encounter: [id number]    Assessment:  Right lower lobe pneumonia  Chest x-ray S today shows some persistent consolidation right lower lobe  Poor depth of inspiration procalcitonin level decreasing was 0 4 yesterday  Some leukocytosis with white blood cell count of 11 9 today hemoglobin stable at 12 4  Acute on chronic hypoxemic respiratory failure  Restrictive lung impairment secondary to obesity  Chronic bronchitis  Mild hemoptysis likely secondary to pneumonia ineffective anticoagulation Eliquis  Obstructive sleep apnea    Plan: Today is day 7 of ceftriaxone and 4azithromycin  Will continue IV antibiotic therapy  Continue azithromycin 1 more day  Continue to hold Eliquis  This was stopped on 2/6  If hemoptysis continues tomorrow will schedule for bronchoscopy Monday afternoon  And 2 new Breo 100 mcg 1 puff daily and neb treatments with levalbuterol 1 25 mg and ipratropium 0 5 mg b i d  Incentive spirometry  Continue BiPAP  Patient had been on 12/5 qHS at home but  He thought this pressure was too low  We changed to placed on auto BiPAP with range of 12/5 pressure to max of 18/11 cm H2O  Patient feels this pressure is better for him  Subjective:   Patient states he still periodically will cough up a tsp for of bright red blood sometimes mixed with mucus  No chest pain  Objective:     Vitals: Blood pressure 100/67, pulse 68, temperature 98 °F (36 7 °C), resp  rate 18, height 5' 10" (1 778 m), weight 126 kg (278 lb 7 1 oz), SpO2 95 %  ,Body mass index is 39 95 kg/m²  Intake/Output Summary (Last 24 hours) at 2/8/2020 1548  Last data filed at 2/8/2020 0800  Gross per 24 hour   Intake 500 ml   Output 1175 ml   Net -675 ml       Physical Exam: Physical Exam   Constitutional: He is oriented to person, place, and time  He appears well-developed and well-nourished  No distress     HENT:   Head: Normocephalic  Nose: Nose normal    Mouth/Throat: Oropharynx is clear and moist  No oropharyngeal exudate  Eyes: Pupils are equal, round, and reactive to light  Conjunctivae are normal    Neck: Neck supple  No JVD present  No tracheal deviation present  Cardiovascular: Normal rate, regular rhythm and normal heart sounds  Pulmonary/Chest: Effort normal    Lung sounds are diminished bilaterally  No wheezes, crackles or rhonchi   Abdominal: Soft  He exhibits no distension  There is no tenderness  There is no guarding  Musculoskeletal: He exhibits no edema  Lymphadenopathy:     He has no cervical adenopathy  Neurological: He is alert and oriented to person, place, and time  Skin: Skin is warm and dry  No rash noted  Psychiatric: He has a normal mood and affect  His behavior is normal  Thought content normal         Labs: I have personally reviewed pertinent lab results  , ABG:   Lab Results   Component Value Date    PHART 7 415 08/09/2019    PNP1BKX 46 3 (H) 08/09/2019    PO2ART 90 0 08/09/2019    IDZ4OFO 29 0 (H) 08/09/2019    BEART 3 7 08/09/2019    SOURCE Radial, Right 08/09/2019   , BNP: No results found for: BNP, CBC:   Lab Results   Component Value Date    WBC 11 94 (H) 02/08/2020    HGB 12 4 02/08/2020    HCT 37 9 02/08/2020    MCV 97 02/08/2020     02/08/2020    ADJUSTEDWBC 8 60 09/14/2016    MCH 31 7 02/08/2020    MCHC 32 7 02/08/2020    RDW 13 0 02/08/2020    MPV 9 3 02/08/2020    NRBC 0 02/08/2020   , CMP:   Lab Results   Component Value Date    K 4 2 02/08/2020     02/08/2020    CO2 29 02/08/2020    BUN 11 02/08/2020    CREATININE 0 82 02/08/2020    CALCIUM 8 4 02/08/2020    AST 20 02/02/2020    ALT 23 02/02/2020    ALKPHOS 70 02/02/2020    EGFR 96 02/08/2020   , PT/INR:   Lab Results   Component Value Date    INR 1 08 02/02/2020   , Troponin: No results found for: TROPONIN    Imaging and other studies: I have personally reviewed pertinent reports     and I have personally reviewed pertinent films in PACS

## 2020-02-08 NOTE — ASSESSMENT & PLAN NOTE
POA   A/e/b fever 102 1, tachycardia, leukocytosis, with source of infection RLL PNA  Lactic acid 2 7  Normalized after receiving normal saline 1000 mL in ED  · Initial Blood cultures: one of two sets + for gram + cocci; suspect contamination  · Repeat set of blood cultures: no growth at 72 hours    · Sputum cx: mixed resp bala  · MRSA screen negative  · Procal 4 -> 0 4  · Monitor temps and WBC

## 2020-02-08 NOTE — ASSESSMENT & PLAN NOTE
Lab Results   Component Value Date    HGBA1C 8 3 (H) 01/06/2020       Recent Labs     02/08/20  2108 02/09/20  0712 02/09/20  1109 02/09/20  1603   POCGLU 179* 162* 188* 183*       Blood Sugar Average: Last 72 hrs:  (P) 195 375     Suboptimal sugar control  · Holding metformin during admission  · Decreased Lantus to 50 units at HS (takes 55 units at home)  · Continue mealtime insulin 20 units TID  · Diabetic diet    · Patient follows endocrine Dr Lucio Mayorga as outpatient

## 2020-02-08 NOTE — OCCUPATIONAL THERAPY NOTE
OT TREATMENT     02/08/20 1400   Restrictions/Precautions   Other Precautions O2;Fall Risk   Pain Assessment   Pain Assessment 0-10   Pain Score 6   Pain Type Acute pain   Pain Location Chest  ("from coughing")   ADL   Where Assessed Chair   Grooming Assistance 5  Supervision/Setup   Grooming Deficit Increased time to complete   Functional Standing Tolerance   Time 2 min   Activity marching in place   Comments rolling walker close supervision   Transfers   Sit to Stand 5  Supervision   Stand to Sit 5  Supervision   Functional Mobility   Functional Mobility 5  Supervision   Additional Comments to the bathroom door and back with change in direction   Additional items Rolling walker   Therapeutic Exercise - ROM   UE-ROM Yes   ROM- Right Upper Extremities   R Shoulder AROM; Flexion;ABduction;Horizontal ABduction   R Elbow AROM;Elbow flexion;Elbow extension   R Wrist AROM; Wrist flexion;Wrist extension   R Hand AROM; Thumb; Index finger; Long finger;Ring finger;Little finger   R Position Seated   Equipment Dowel   R Weight/Reps/Sets 2 sets x 5 with rest breaks   ROM - Left Upper Extremities    L Shoulder AROM; Flexion;ABduction;Horizontal ABduction   L Elbow AROM;Elbow flexion;Elbow extension   L Wrist AROM; Wrist flexion;Wrist extension   L Hand AROM; Thumb; Index finger; Long finger;Ring finger;Little finger   L Position Seated   Equipment Dowel   L Weight/Reps/Sets 2 sets x 5 reps with rest breaks   Cognition   Overall Cognitive Status WFL   Arousal/Participation Alert; Cooperative   Attention Within functional limits   Orientation Level Oriented X4   Memory Within functional limits   Following Commands Follows all commands and directions without difficulty   Additional Activities   Additional Activities   (review of energy conservation strategies with MIN assist)   Activity Tolerance   Activity Tolerance Patient limited by fatigue  (limited by SOB; SPO2% 94% throughout on 2 L O2 via n/c)   Assessment   Assessment Pt seen at bedside for skilled tx  Pt agreeable to BUE ROM/Therex  Pt benefits from frequent rest breaks and reminders to rest  Pt reviewed energy conservation strategies for ADL performance at home with MIN cues  Pt completed functional mobility to and from bathroom with supervision with rolling walker and O2 for ADL grooming task  Pt fatigues quickly and requires frequent rest breaks  Pt very receptive to strategies to conserve energy  Pt anxious to go home and would like to pursue Home OT and home health services  Pt returned to bedside chair with call bell and all needs within reach  Pt will continue to benefit from skilled tx  will follow  Recommend D/C to Home with home OT/HHA  Thank  you  Plan   Treatment Interventions ADL retraining;Functional transfer training;UE strengthening/ROM; Endurance training;Patient/family training;Equipment evaluation/education; Compensatory technique education; Energy conservation; Activityengagement   Goal Expiration Date 02/17/20   OT Frequency 3-5x/wk   Recommendation   OT Discharge Recommendation Home OT   Equipment Recommended Tub seat with back;Raised toilet seat;Bedside commode   Licensure   [place] License Number  [id number] [place] [address] License# [id number]

## 2020-02-08 NOTE — ASSESSMENT & PLAN NOTE
BP stable  · Continue losartan, metoprolol with holding parameter  · Blood pressure ranging 115/70 to 141/85

## 2020-02-08 NOTE — ASSESSMENT & PLAN NOTE
Sodium 130 on admission  Acute on chronic  Baseline sodium appears to be 134-135, likely due to psych medications + PNA  Na stable now at baseline, monitor  Sodium currently 137

## 2020-02-08 NOTE — ASSESSMENT & PLAN NOTE
Likely secondary to PNA, atelectasis  · Patient uses oxygen 2 L NC continuous, BiPAP 12/5 w/ 2 L at HS for SHAVONNE  · Continue bronchodilators, Mucomyst, aggressive pulmonary hygiene  · BiPAP transitioned to auto BiPAP - patient tolerating  · Will need home O2 eval prior to discharge  · Patient will have bronchoscopy today as per Pulmonary

## 2020-02-08 NOTE — ASSESSMENT & PLAN NOTE
Patient presented with uncontrolled AFib  EKG on admission AFib, heart rate 125  Repeat EKG during admission with rate 80s  · Heart rate improved with home medications, continue Toprol  mg daily and digoxin 250 mcg daily  · Tele - controlled AFib  Discontinued tele  · Monitor electrolytes, goal potassium > 4 and magnesium > 2  · Holding  Eliquis given continued hemoptysis  · Patient denies any further palpitations, did complain of them yesterday morning  EKG done at that time showed AFib 90s  · Heart rate has been controlled 70s and 80s

## 2020-02-09 LAB
GLUCOSE SERPL-MCNC: 162 MG/DL (ref 65–140)
GLUCOSE SERPL-MCNC: 183 MG/DL (ref 65–140)
GLUCOSE SERPL-MCNC: 188 MG/DL (ref 65–140)
GLUCOSE SERPL-MCNC: 219 MG/DL (ref 65–140)

## 2020-02-09 PROCEDURE — 94640 AIRWAY INHALATION TREATMENT: CPT

## 2020-02-09 PROCEDURE — 82948 REAGENT STRIP/BLOOD GLUCOSE: CPT

## 2020-02-09 PROCEDURE — 93005 ELECTROCARDIOGRAM TRACING: CPT

## 2020-02-09 PROCEDURE — 99232 SBSQ HOSP IP/OBS MODERATE 35: CPT | Performed by: INTERNAL MEDICINE

## 2020-02-09 PROCEDURE — 94760 N-INVAS EAR/PLS OXIMETRY 1: CPT

## 2020-02-09 PROCEDURE — 0B9F8ZX DRAINAGE OF RIGHT LOWER LUNG LOBE, VIA NATURAL OR ARTIFICIAL OPENING ENDOSCOPIC, DIAGNOSTIC: ICD-10-PCS | Performed by: INTERNAL MEDICINE

## 2020-02-09 PROCEDURE — 99232 SBSQ HOSP IP/OBS MODERATE 35: CPT | Performed by: STUDENT IN AN ORGANIZED HEALTH CARE EDUCATION/TRAINING PROGRAM

## 2020-02-09 RX ADMIN — INSULIN LISPRO 20 UNITS: 100 INJECTION, SOLUTION INTRAVENOUS; SUBCUTANEOUS at 07:34

## 2020-02-09 RX ADMIN — CEFTRIAXONE 2000 MG: 2 INJECTION, SOLUTION INTRAVENOUS at 21:00

## 2020-02-09 RX ADMIN — BUSPIRONE HYDROCHLORIDE 15 MG: 10 TABLET ORAL at 09:03

## 2020-02-09 RX ADMIN — LOSARTAN POTASSIUM 50 MG: 50 TABLET, FILM COATED ORAL at 17:53

## 2020-02-09 RX ADMIN — GUAIFENESIN 1200 MG: 600 TABLET, EXTENDED RELEASE ORAL at 09:03

## 2020-02-09 RX ADMIN — INSULIN LISPRO 1 UNITS: 100 INJECTION, SOLUTION INTRAVENOUS; SUBCUTANEOUS at 07:33

## 2020-02-09 RX ADMIN — ALPRAZOLAM 2 MG: 0.5 TABLET ORAL at 21:02

## 2020-02-09 RX ADMIN — ACETYLCYSTEINE 800 MG: 200 SOLUTION ORAL; RESPIRATORY (INHALATION) at 19:47

## 2020-02-09 RX ADMIN — INSULIN LISPRO 1 UNITS: 100 INJECTION, SOLUTION INTRAVENOUS; SUBCUTANEOUS at 11:48

## 2020-02-09 RX ADMIN — Medication 250 MG: at 17:53

## 2020-02-09 RX ADMIN — FLUTICASONE FUROATE AND VILANTEROL TRIFENATATE 1 PUFF: 100; 25 POWDER RESPIRATORY (INHALATION) at 09:05

## 2020-02-09 RX ADMIN — QUETIAPINE FUMARATE 200 MG: 200 TABLET, EXTENDED RELEASE ORAL at 09:02

## 2020-02-09 RX ADMIN — SERTRALINE HYDROCHLORIDE 50 MG: 50 TABLET ORAL at 21:02

## 2020-02-09 RX ADMIN — LEVALBUTEROL HYDROCHLORIDE 1.25 MG: 1.25 SOLUTION, CONCENTRATE RESPIRATORY (INHALATION) at 14:43

## 2020-02-09 RX ADMIN — INSULIN GLARGINE 50 UNITS: 100 INJECTION, SOLUTION SUBCUTANEOUS at 21:02

## 2020-02-09 RX ADMIN — PREGABALIN 50 MG: 50 CAPSULE ORAL at 21:05

## 2020-02-09 RX ADMIN — PREGABALIN 50 MG: 50 CAPSULE ORAL at 09:03

## 2020-02-09 RX ADMIN — IPRATROPIUM BROMIDE 0.5 MG: 0.5 SOLUTION RESPIRATORY (INHALATION) at 07:54

## 2020-02-09 RX ADMIN — IPRATROPIUM BROMIDE 0.5 MG: 0.5 SOLUTION RESPIRATORY (INHALATION) at 14:43

## 2020-02-09 RX ADMIN — ACETYLCYSTEINE 600 MG: 200 SOLUTION ORAL; RESPIRATORY (INHALATION) at 14:43

## 2020-02-09 RX ADMIN — QUETIAPINE FUMARATE 400 MG: 100 TABLET ORAL at 21:02

## 2020-02-09 RX ADMIN — PANTOPRAZOLE SODIUM 40 MG: 40 TABLET, DELAYED RELEASE ORAL at 21:02

## 2020-02-09 RX ADMIN — ACETAMINOPHEN 650 MG: 325 TABLET, FILM COATED ORAL at 07:38

## 2020-02-09 RX ADMIN — BUSPIRONE HYDROCHLORIDE 15 MG: 10 TABLET ORAL at 17:53

## 2020-02-09 RX ADMIN — DIGOXIN 250 MCG: 125 TABLET ORAL at 09:04

## 2020-02-09 RX ADMIN — LEVALBUTEROL HYDROCHLORIDE 1.25 MG: 1.25 SOLUTION, CONCENTRATE RESPIRATORY (INHALATION) at 19:45

## 2020-02-09 RX ADMIN — INSULIN LISPRO 20 UNITS: 100 INJECTION, SOLUTION INTRAVENOUS; SUBCUTANEOUS at 11:48

## 2020-02-09 RX ADMIN — INSULIN LISPRO 2 UNITS: 100 INJECTION, SOLUTION INTRAVENOUS; SUBCUTANEOUS at 21:06

## 2020-02-09 RX ADMIN — PREGABALIN 50 MG: 50 CAPSULE ORAL at 16:18

## 2020-02-09 RX ADMIN — ACETYLCYSTEINE 600 MG: 200 SOLUTION ORAL; RESPIRATORY (INHALATION) at 07:53

## 2020-02-09 RX ADMIN — METOPROLOL SUCCINATE 200 MG: 100 TABLET, EXTENDED RELEASE ORAL at 09:04

## 2020-02-09 RX ADMIN — INSULIN LISPRO 1 UNITS: 100 INJECTION, SOLUTION INTRAVENOUS; SUBCUTANEOUS at 16:17

## 2020-02-09 RX ADMIN — GUAIFENESIN 1200 MG: 600 TABLET, EXTENDED RELEASE ORAL at 17:53

## 2020-02-09 RX ADMIN — ATORVASTATIN CALCIUM 40 MG: 40 TABLET, FILM COATED ORAL at 16:17

## 2020-02-09 RX ADMIN — INSULIN LISPRO 20 UNITS: 100 INJECTION, SOLUTION INTRAVENOUS; SUBCUTANEOUS at 16:18

## 2020-02-09 RX ADMIN — LEVALBUTEROL HYDROCHLORIDE 1.25 MG: 1.25 SOLUTION, CONCENTRATE RESPIRATORY (INHALATION) at 07:54

## 2020-02-09 RX ADMIN — Medication 250 MG: at 09:03

## 2020-02-09 RX ADMIN — IPRATROPIUM BROMIDE 0.5 MG: 0.5 SOLUTION RESPIRATORY (INHALATION) at 19:45

## 2020-02-09 NOTE — PLAN OF CARE
Problem: Potential for Falls  Goal: Patient will remain free of falls  Description  INTERVENTIONS:  - Assess patient frequently for physical needs  -  Identify cognitive and physical deficits and behaviors that affect risk of falls    -  [place] fall precautions as indicated by assessment   - Educate patient/family on patient safety including physical limitations  - Instruct patient to call for assistance with activity based on assessment  - Modify environment to reduce risk of injury  - Consider OT/PT consult to assist with strengthening/mobility  Outcome: Progressing     Problem: Prexisting or High Potential for Compromised Skin Integrity  Goal: Skin integrity is maintained or improved  Description  INTERVENTIONS:  - Identify patients at risk for skin breakdown  - Assess and monitor skin integrity  - Assess and monitor nutrition and hydration status  - Monitor labs   - Assist with mobility/ambulation  - Relieve pressure over bony prominences  - Avoid friction and shearing  - Provide appropriate hygiene as needed including keeping skin clean and dry  - Evaluate need for skin moisturizer/barrier cream  - Collaborate with interdisciplinary team   - Patient/family teaching  - Consider wound care consult    Outcome: Progressing     Problem: RESPIRATORY - ADULT  Goal: Achieves optimal ventilation and oxygenation  Description  INTERVENTIONS:  - Assess for changes in respiratory status  - Assess for changes in mentation and behavior  - Position to facilitate oxygenation and minimize respiratory effort  - Oxygen administered by appropriate delivery if ordered  Intiate bi-pap  Intiate bronchodilator therapy  Q 6 hours  - Encourage broncho-pulmonary hygiene including cough, deep breathe, Incentive Spirometry  - Assess the need for suctioning and aspirate as needed  - Assess and instruct to report SOB or any respiratory difficulty  - Respiratory Therapy support as indicated    Outcome: Progressing
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
## 2020-02-10 ENCOUNTER — APPOINTMENT (INPATIENT)
Dept: PERIOP | Facility: HOSPITAL | Age: 60
DRG: 871 | End: 2020-02-10
Attending: INTERNAL MEDICINE
Payer: MEDICARE

## 2020-02-10 ENCOUNTER — PATIENT OUTREACH (OUTPATIENT)
Dept: CASE MANAGEMENT | Facility: OTHER | Age: 60
End: 2020-02-10

## 2020-02-10 ENCOUNTER — ANESTHESIA (INPATIENT)
Dept: PERIOP | Facility: HOSPITAL | Age: 60
DRG: 871 | End: 2020-02-10
Payer: MEDICARE

## 2020-02-10 ENCOUNTER — HOSPITAL ENCOUNTER (OUTPATIENT)
Dept: INFUSION CENTER | Facility: HOSPITAL | Age: 60
Discharge: HOME | End: 2020-02-10
Attending: INTERNAL MEDICINE

## 2020-02-10 ENCOUNTER — ANESTHESIA EVENT (INPATIENT)
Dept: PERIOP | Facility: HOSPITAL | Age: 60
DRG: 871 | End: 2020-02-10
Payer: MEDICARE

## 2020-02-10 LAB
ANION GAP SERPL CALCULATED.3IONS-SCNC: 8 MMOL/L (ref 4–13)
ATRIAL RATE: 120 BPM
ATRIAL RATE: 93 BPM
BASOPHILS # BLD MANUAL: 0 THOUSAND/UL (ref 0–0.1)
BASOPHILS NFR MAR MANUAL: 0 % (ref 0–1)
BUN SERPL-MCNC: 12 MG/DL (ref 5–25)
CALCIUM SERPL-MCNC: 8.5 MG/DL (ref 8.3–10.1)
CHLORIDE SERPL-SCNC: 99 MMOL/L (ref 100–108)
CO2 SERPL-SCNC: 30 MMOL/L (ref 21–32)
CREAT SERPL-MCNC: 0.88 MG/DL (ref 0.6–1.3)
EOSINOPHIL # BLD MANUAL: 0.25 THOUSAND/UL (ref 0–0.4)
EOSINOPHIL NFR BLD MANUAL: 2 % (ref 0–6)
ERYTHROCYTE [DISTWIDTH] IN BLOOD BY AUTOMATED COUNT: 13 % (ref 11.6–15.1)
GFR SERPL CREATININE-BSD FRML MDRD: 93 ML/MIN/1.73SQ M
GLUCOSE SERPL-MCNC: 162 MG/DL (ref 65–140)
GLUCOSE SERPL-MCNC: 163 MG/DL (ref 65–140)
GLUCOSE SERPL-MCNC: 167 MG/DL (ref 65–140)
GLUCOSE SERPL-MCNC: 174 MG/DL (ref 65–140)
GLUCOSE SERPL-MCNC: 222 MG/DL (ref 65–140)
HCT VFR BLD AUTO: 39.6 % (ref 36.5–49.3)
HGB BLD-MCNC: 12.8 G/DL (ref 12–17)
INR PPP: 1.07 (ref 0.91–1.09)
LYMPHOCYTES # BLD AUTO: 26 % (ref 14–44)
LYMPHOCYTES # BLD AUTO: 3.26 THOUSAND/UL (ref 0.6–4.47)
MCH RBC QN AUTO: 31.1 PG (ref 26.8–34.3)
MCHC RBC AUTO-ENTMCNC: 32.3 G/DL (ref 31.4–37.4)
MCV RBC AUTO: 96 FL (ref 82–98)
MONOCYTES # BLD AUTO: 1.51 THOUSAND/UL (ref 0–1.22)
MONOCYTES NFR BLD: 12 % (ref 4–12)
NEUTROPHILS # BLD MANUAL: 7.53 THOUSAND/UL (ref 1.85–7.62)
NEUTS BAND NFR BLD MANUAL: 7 % (ref 0–8)
NEUTS SEG NFR BLD AUTO: 53 % (ref 43–75)
NRBC BLD AUTO-RTO: 0 /100 WBCS
PLATELET # BLD AUTO: 287 THOUSANDS/UL (ref 149–390)
PLATELET BLD QL SMEAR: ADEQUATE
PMV BLD AUTO: 9.3 FL (ref 8.9–12.7)
POTASSIUM SERPL-SCNC: 4.3 MMOL/L (ref 3.5–5.3)
PROTHROMBIN TIME: 11.5 SECONDS (ref 9.8–12)
QRS AXIS: 75 DEGREES
QRS AXIS: 76 DEGREES
QRSD INTERVAL: 86 MS
QRSD INTERVAL: 88 MS
QT INTERVAL: 352 MS
QT INTERVAL: 362 MS
QTC INTERVAL: 413 MS
QTC INTERVAL: 417 MS
RBC # BLD AUTO: 4.11 MILLION/UL (ref 3.88–5.62)
RBC MORPH BLD: NORMAL
SMUDGE CELLS BLD QL SMEAR: PRESENT
SODIUM SERPL-SCNC: 137 MMOL/L (ref 136–145)
T WAVE AXIS: 31 DEGREES
T WAVE AXIS: 56 DEGREES
TOTAL CELLS COUNTED SPEC: 100
TOXIC GRANULES BLD QL SMEAR: PRESENT
VENTRICULAR RATE: 80 BPM
VENTRICULAR RATE: 83 BPM
WBC # BLD AUTO: 12.55 THOUSAND/UL (ref 4.31–10.16)

## 2020-02-10 PROCEDURE — 87102 FUNGUS ISOLATION CULTURE: CPT | Performed by: INTERNAL MEDICINE

## 2020-02-10 PROCEDURE — 88305 TISSUE EXAM BY PATHOLOGIST: CPT | Performed by: PATHOLOGY

## 2020-02-10 PROCEDURE — 94668 MNPJ CHEST WALL SBSQ: CPT

## 2020-02-10 PROCEDURE — 85027 COMPLETE CBC AUTOMATED: CPT | Performed by: STUDENT IN AN ORGANIZED HEALTH CARE EDUCATION/TRAINING PROGRAM

## 2020-02-10 PROCEDURE — 87070 CULTURE OTHR SPECIMN AEROBIC: CPT | Performed by: INTERNAL MEDICINE

## 2020-02-10 PROCEDURE — 99231 SBSQ HOSP IP/OBS SF/LOW 25: CPT | Performed by: INTERNAL MEDICINE

## 2020-02-10 PROCEDURE — 88112 CYTOPATH CELL ENHANCE TECH: CPT | Performed by: PATHOLOGY

## 2020-02-10 PROCEDURE — 94640 AIRWAY INHALATION TREATMENT: CPT

## 2020-02-10 PROCEDURE — 80048 BASIC METABOLIC PNL TOTAL CA: CPT | Performed by: STUDENT IN AN ORGANIZED HEALTH CARE EDUCATION/TRAINING PROGRAM

## 2020-02-10 PROCEDURE — 87206 SMEAR FLUORESCENT/ACID STAI: CPT | Performed by: INTERNAL MEDICINE

## 2020-02-10 PROCEDURE — 31624 DX BRONCHOSCOPE/LAVAGE: CPT

## 2020-02-10 PROCEDURE — 87116 MYCOBACTERIA CULTURE: CPT | Performed by: INTERNAL MEDICINE

## 2020-02-10 PROCEDURE — 93010 ELECTROCARDIOGRAM REPORT: CPT | Performed by: INTERNAL MEDICINE

## 2020-02-10 PROCEDURE — 97530 THERAPEUTIC ACTIVITIES: CPT

## 2020-02-10 PROCEDURE — 99232 SBSQ HOSP IP/OBS MODERATE 35: CPT | Performed by: NURSE PRACTITIONER

## 2020-02-10 PROCEDURE — 82948 REAGENT STRIP/BLOOD GLUCOSE: CPT

## 2020-02-10 PROCEDURE — 94760 N-INVAS EAR/PLS OXIMETRY 1: CPT

## 2020-02-10 PROCEDURE — 85610 PROTHROMBIN TIME: CPT | Performed by: STUDENT IN AN ORGANIZED HEALTH CARE EDUCATION/TRAINING PROGRAM

## 2020-02-10 PROCEDURE — 85007 BL SMEAR W/DIFF WBC COUNT: CPT | Performed by: STUDENT IN AN ORGANIZED HEALTH CARE EDUCATION/TRAINING PROGRAM

## 2020-02-10 RX ORDER — LIDOCAINE HYDROCHLORIDE 40 MG/ML
5 INJECTION, SOLUTION RETROBULBAR; TOPICAL ONCE
Status: COMPLETED | OUTPATIENT
Start: 2020-02-10 | End: 2020-02-10

## 2020-02-10 RX ORDER — LIDOCAINE HYDROCHLORIDE 40 MG/ML
3 INJECTION, SOLUTION RETROBULBAR; TOPICAL ONCE
Status: DISCONTINUED | OUTPATIENT
Start: 2020-02-10 | End: 2020-02-10

## 2020-02-10 RX ORDER — SODIUM CHLORIDE 9 MG/ML
40 INJECTION, SOLUTION INTRAVENOUS CONTINUOUS
Status: DISCONTINUED | OUTPATIENT
Start: 2020-02-10 | End: 2020-02-11 | Stop reason: HOSPADM

## 2020-02-10 RX ORDER — ONDANSETRON 2 MG/ML
4 INJECTION INTRAMUSCULAR; INTRAVENOUS ONCE AS NEEDED
Status: DISCONTINUED | OUTPATIENT
Start: 2020-02-10 | End: 2020-02-10 | Stop reason: HOSPADM

## 2020-02-10 RX ORDER — SODIUM CHLORIDE 9 MG/ML
INJECTION, SOLUTION INTRAVENOUS CONTINUOUS PRN
Status: DISCONTINUED | OUTPATIENT
Start: 2020-02-10 | End: 2020-02-10 | Stop reason: SURG

## 2020-02-10 RX ORDER — PROPOFOL 10 MG/ML
INJECTION, EMULSION INTRAVENOUS AS NEEDED
Status: DISCONTINUED | OUTPATIENT
Start: 2020-02-10 | End: 2020-02-10 | Stop reason: SURG

## 2020-02-10 RX ORDER — SODIUM CHLORIDE 9 MG/ML
INJECTION INTRAVENOUS CODE/TRAUMA/SEDATION MEDICATION
Status: DISCONTINUED | OUTPATIENT
Start: 2020-02-10 | End: 2020-02-11 | Stop reason: HOSPADM

## 2020-02-10 RX ADMIN — SODIUM CHLORIDE 45 ML: 9 INJECTION, SOLUTION INTRAMUSCULAR; INTRAVENOUS; SUBCUTANEOUS at 15:41

## 2020-02-10 RX ADMIN — ERGOCALCIFEROL 50000 UNITS: 1.25 CAPSULE ORAL at 08:52

## 2020-02-10 RX ADMIN — IPRATROPIUM BROMIDE 0.5 MG: 0.5 SOLUTION RESPIRATORY (INHALATION) at 08:17

## 2020-02-10 RX ADMIN — BUSPIRONE HYDROCHLORIDE 15 MG: 10 TABLET ORAL at 17:20

## 2020-02-10 RX ADMIN — LOSARTAN POTASSIUM 50 MG: 50 TABLET, FILM COATED ORAL at 17:20

## 2020-02-10 RX ADMIN — QUETIAPINE FUMARATE 200 MG: 200 TABLET, EXTENDED RELEASE ORAL at 08:52

## 2020-02-10 RX ADMIN — PROPOFOL 20 MG: 10 INJECTION, EMULSION INTRAVENOUS at 15:34

## 2020-02-10 RX ADMIN — QUETIAPINE FUMARATE 400 MG: 100 TABLET ORAL at 21:54

## 2020-02-10 RX ADMIN — GUAIFENESIN 1200 MG: 600 TABLET, EXTENDED RELEASE ORAL at 17:20

## 2020-02-10 RX ADMIN — INSULIN LISPRO 1 UNITS: 100 INJECTION, SOLUTION INTRAVENOUS; SUBCUTANEOUS at 21:54

## 2020-02-10 RX ADMIN — METOPROLOL SUCCINATE 200 MG: 100 TABLET, EXTENDED RELEASE ORAL at 08:50

## 2020-02-10 RX ADMIN — PREGABALIN 50 MG: 50 CAPSULE ORAL at 17:27

## 2020-02-10 RX ADMIN — PREGABALIN 50 MG: 50 CAPSULE ORAL at 21:54

## 2020-02-10 RX ADMIN — ALPRAZOLAM 2 MG: 0.5 TABLET ORAL at 21:54

## 2020-02-10 RX ADMIN — INSULIN LISPRO 1 UNITS: 100 INJECTION, SOLUTION INTRAVENOUS; SUBCUTANEOUS at 17:21

## 2020-02-10 RX ADMIN — FLUTICASONE FUROATE AND VILANTEROL TRIFENATATE 1 PUFF: 100; 25 POWDER RESPIRATORY (INHALATION) at 08:50

## 2020-02-10 RX ADMIN — PREGABALIN 50 MG: 50 CAPSULE ORAL at 08:51

## 2020-02-10 RX ADMIN — ACETYLCYSTEINE 600 MG: 200 SOLUTION ORAL; RESPIRATORY (INHALATION) at 20:10

## 2020-02-10 RX ADMIN — Medication 250 MG: at 08:51

## 2020-02-10 RX ADMIN — LIDOCAINE HYDROCHLORIDE 12 ML: 20 JELLY TOPICAL at 15:39

## 2020-02-10 RX ADMIN — IPRATROPIUM BROMIDE 0.5 MG: 0.5 SOLUTION RESPIRATORY (INHALATION) at 14:05

## 2020-02-10 RX ADMIN — DIGOXIN 250 MCG: 125 TABLET ORAL at 08:50

## 2020-02-10 RX ADMIN — PROPOFOL 50 MG: 10 INJECTION, EMULSION INTRAVENOUS at 15:35

## 2020-02-10 RX ADMIN — SODIUM CHLORIDE 40 ML/HR: 0.9 INJECTION, SOLUTION INTRAVENOUS at 19:55

## 2020-02-10 RX ADMIN — SODIUM CHLORIDE 40 ML/HR: 0.9 INJECTION, SOLUTION INTRAVENOUS at 15:23

## 2020-02-10 RX ADMIN — PANTOPRAZOLE SODIUM 40 MG: 40 TABLET, DELAYED RELEASE ORAL at 21:54

## 2020-02-10 RX ADMIN — LEVALBUTEROL HYDROCHLORIDE 1.25 MG: 1.25 SOLUTION, CONCENTRATE RESPIRATORY (INHALATION) at 08:17

## 2020-02-10 RX ADMIN — INSULIN LISPRO 20 UNITS: 100 INJECTION, SOLUTION INTRAVENOUS; SUBCUTANEOUS at 17:21

## 2020-02-10 RX ADMIN — ACETYLCYSTEINE 600 MG: 200 SOLUTION ORAL; RESPIRATORY (INHALATION) at 08:18

## 2020-02-10 RX ADMIN — GUAIFENESIN 1200 MG: 600 TABLET, EXTENDED RELEASE ORAL at 08:50

## 2020-02-10 RX ADMIN — SERTRALINE HYDROCHLORIDE 50 MG: 50 TABLET ORAL at 21:54

## 2020-02-10 RX ADMIN — IPRATROPIUM BROMIDE 0.5 MG: 0.5 SOLUTION RESPIRATORY (INHALATION) at 20:10

## 2020-02-10 RX ADMIN — SODIUM CHLORIDE: 0.9 INJECTION, SOLUTION INTRAVENOUS at 15:32

## 2020-02-10 RX ADMIN — ATORVASTATIN CALCIUM 40 MG: 40 TABLET, FILM COATED ORAL at 17:20

## 2020-02-10 RX ADMIN — ACETYLCYSTEINE 600 MG: 200 SOLUTION ORAL; RESPIRATORY (INHALATION) at 14:06

## 2020-02-10 RX ADMIN — BUSPIRONE HYDROCHLORIDE 15 MG: 10 TABLET ORAL at 08:50

## 2020-02-10 RX ADMIN — LEVALBUTEROL HYDROCHLORIDE 1.25 MG: 1.25 SOLUTION, CONCENTRATE RESPIRATORY (INHALATION) at 20:10

## 2020-02-10 RX ADMIN — LEVALBUTEROL HYDROCHLORIDE 1.25 MG: 1.25 SOLUTION, CONCENTRATE RESPIRATORY (INHALATION) at 14:05

## 2020-02-10 RX ADMIN — Medication 250 MG: at 17:20

## 2020-02-10 RX ADMIN — INSULIN GLARGINE 50 UNITS: 100 INJECTION, SOLUTION SUBCUTANEOUS at 21:53

## 2020-02-10 RX ADMIN — LIDOCAINE HYDROCHLORIDE 5 ML: 40 INJECTION, SOLUTION RETROBULBAR; TOPICAL at 15:18

## 2020-02-10 RX ADMIN — CEFTRIAXONE 2000 MG: 2 INJECTION, SOLUTION INTRAVENOUS at 19:56

## 2020-02-10 NOTE — ANESTHESIA PREPROCEDURE EVALUATION
Review of Systems/Medical History  Patient summary reviewed  Chart reviewed  No history of anesthetic complications     Cardiovascular  Hypertension , CAD , Dysrhythmias , atrial fibrillation,    Pulmonary  Smoker ex-smoker  Cumulative Pack Years: 76, COPD , Sleep apnea ,        GI/Hepatic    GERD ,        Kidney disease , Chronic kidney disease stage 2,        Endo/Other  Diabetes type 2 Insulin,   Obesity    GYN       Hematology   Musculoskeletal  Back pain ,        Neurology   Psychology   Anxiety, Depression , bipolar disorder,              Physical Exam    Airway    Mallampati score: II  TM Distance: >3 FB  Neck ROM: full     Dental   No notable dental hx upper dentures and lower dentures,     Cardiovascular  Rhythm: regular, Rate: normal, Cardiovascular exam normal    Pulmonary  Pulmonary exam normal Breath sounds clear to auscultation, Rhonchi, Decreased breath sounds, Wheezes,     Other Findings        Anesthesia Plan  ASA Score- 3     Anesthesia Type- IV sedation with anesthesia with ASA Monitors  Additional Monitors:   Airway Plan:         Plan Factors-    Induction-     Postoperative Plan-     Informed Consent- Anesthetic plan and risks discussed with patient  I personally reviewed this patient with the CRNA  Discussed and agreed on the Anesthesia Plan with the CRNA  Melvin Adams

## 2020-02-10 NOTE — PHYSICAL THERAPY NOTE
Attempted to see pt x2  First attempt pt was going for [employer]; second attempt, pt was eating dinner  Will follow tomorrow  Lakisha Luong PT  [id number]

## 2020-02-10 NOTE — RESPIRATORY THERAPY NOTE
Assisted in bronchoscopy for this patient  Patient tolerated all well without incident  No distress noted

## 2020-02-10 NOTE — OCCUPATIONAL THERAPY NOTE
OT Treatment Note       02/10/20 1446   Restrictions/Precautions   Other Precautions O2;Pain   Pain Assessment   Pain Assessment No/denies pain   Pain Score No Pain   Bed Mobility   Supine to Sit 5  Supervision   Transfers   Sit to Stand 5  Supervision   Additional items Assist x 1;Verbal cues  (From EOB)   Stand to Sit 5  Supervision   Additional items Assist x 1;Verbal cues  (To wheelchair)   Functional Mobility   Functional Mobility 5  Supervision   Additional Comments Patient ambulated short household distance bed to door with supervision and no AD   Activity Tolerance   Activity Tolerance Patient tolerated treatment well   Assessment   Assessment Patient seen for OT treatment this PM  Patient able to perform bed mobility with supervision, sit to stand from EOB with supervision  Patient care tech then present in room with wheelchair, to take patient to test  Patient assisted to wheelchair with supervision, further activity deferred due to patient going for test  Patient will benefit from continued OT services in order to maximize functional independence and prepare for safe discharge   Continue OT per POC   Recommendation   OT Discharge Recommendation Home OT   Licensure   [place] License Number  [place], [place] [id number]

## 2020-02-10 NOTE — ANESTHESIA POSTPROCEDURE EVALUATION
Post-Op Assessment Note    CV Status:  Stable  Pain Score: 1    Pain management: adequate     Mental Status:  Agitated   Hydration Status:  Stable   PONV Controlled:  None   Airway Patency:  Patent   Post Op Vitals Reviewed: Yes      Staff: Anesthesiologist           BP   117/69   Temp      Pulse  84   Resp      SpO2   92

## 2020-02-10 NOTE — PLAN OF CARE
Problem: Potential for Falls  Goal: Patient will remain free of falls  Description  INTERVENTIONS:  - Assess patient frequently for physical needs  -  Identify cognitive and physical deficits and behaviors that affect risk of falls    -  [place] fall precautions as indicated by assessment   - Educate patient/family on patient safety including physical limitations  - Instruct patient to call for assistance with activity based on assessment  - Modify environment to reduce risk of injury  - Consider OT/PT consult to assist with strengthening/mobility  Outcome: Progressing     Problem: Prexisting or High Potential for Compromised Skin Integrity  Goal: Skin integrity is maintained or improved  Description  INTERVENTIONS:  - Identify patients at risk for skin breakdown  - Assess and monitor skin integrity  - Assess and monitor nutrition and hydration status  - Monitor labs   - Assist with mobility/ambulation  - Relieve pressure over bony prominences  - Avoid friction and shearing  - Provide appropriate hygiene as needed including keeping skin clean and dry  - Evaluate need for skin moisturizer/barrier cream  - Collaborate with interdisciplinary team   - Patient/family teaching  - Consider wound care consult    Outcome: Progressing     Problem: RESPIRATORY - ADULT  Goal: Achieves optimal ventilation and oxygenation  Description  INTERVENTIONS:  - Assess for changes in respiratory status  - Assess for changes in mentation and behavior  - Position to facilitate oxygenation and minimize respiratory effort  - Oxygen administered by appropriate delivery if ordered  Intiate bi-pap  Intiate bronchodilator therapy  Q 6 hours  - Encourage broncho-pulmonary hygiene including cough, deep breathe, Incentive Spirometry  - Assess the need for suctioning and aspirate as needed  - Assess and instruct to report SOB or any respiratory difficulty  - Respiratory Therapy support as indicated    Outcome: Progressing

## 2020-02-10 NOTE — ASSESSMENT & PLAN NOTE
Patient reported diarrhea uncontrollably at [facility] for past couple of days at home prior to admission  Denies antibiotic use in past 3 months  · Denies any diarrhea currently    · Continue probiotic BID

## 2020-02-10 NOTE — PROGRESS NOTES
Progress Note Reshma Nobles 1960, 60 y o  male MRN: [id number]    Unit/Bed#: 2 [place] [address] Encounter: [id number]    Primary Care Provider: Marquis Rajesh MD   Date and time admitted to hospital: 2/2/2020  6:55 PM        * Acute on chronic respiratory failure with hypoxia [facility])  Assessment & Plan  Likely secondary to PNA, atelectasis  · Patient uses oxygen 2 L NC continuous, BiPAP 12/5 w/ 2 L at HS for SHAVONNE  · Continue bronchodilators, Mucomyst, aggressive pulmonary hygiene  · BiPAP transitioned to auto BiPAP - patient tolerating  · Will need home O2 eval prior to discharge  · Patient will have bronchoscopy today as per Pulmonary  Severe sepsis ([facility] )  Assessment & Plan  POA  A/e/b fever 102 1, tachycardia, leukocytosis, with source of infection RLL PNA  Lactic acid 2 7  Normalized after receiving normal saline 1000 mL in ED  · Initial Blood cultures: one of two sets + for gram + cocci; suspect contamination  · Repeat set of blood cultures: no growth at 72 hours  · Sputum cx: mixed resp bala  · MRSA screen negative  · Procal 4 -> 0 4  · Monitor temps and WBC    COPD (chronic obstructive pulmonary disease) ([facility] )  Assessment & Plan  Patient was already on steroid taper 30 -20 -10 mg PO daily for 3 days by his pulmonologist's office last week  · Prednisone discontinued  · Continue Trelegy inhaler (substitute with Breo + atrovent neb)  · Appreciate pulmonology consultation and recommendations  Community acquired pneumonia  Assessment & Plan  Patient presents with fever, cough, SOB  CT chest - Right basilar consolidation with air bronchograms  Left basilar relaxation atelectasis    Flu/RSV negative  · Completed 5 day course of Azithromycin  · Continue Rocephin 2 gm IV daily (weight based), day #9  · Continue Atrovent/Xopenex TID, Xopenex PRN, Mucinex  · Patient needs aggressive mucus clearance with acapella valve + IS 10x/hr while awake    · Pulmonary following, appreciate input  · Repeat CXRs have shown persistent PNA  · Continues with hemoptysis  Plan for bronchoscopy today with pulmonology  Hyponatremia  Assessment & Plan  Sodium 130 on admission  Acute on chronic  Baseline sodium appears to be 134-135, likely due to psych medications + PNA  Na stable now at baseline, monitor  Sodium currently 137  Benign essential hypertension  Assessment & Plan  BP stable  · Continue losartan, metoprolol with holding parameter  · Blood pressure ranging 115/70 to 141/85  Chronic a-fib  Assessment & Plan  Patient presented with uncontrolled AFib  EKG on admission AFib, heart rate 125  Repeat EKG during admission with rate 80s  · Heart rate improved with home medications, continue Toprol  mg daily and digoxin 250 mcg daily  · Tele - controlled AFib  Discontinued tele  · Monitor electrolytes, goal potassium > 4 and magnesium > 2  · Holding  Eliquis given continued hemoptysis  · Patient denies any further palpitations, did complain of them yesterday morning  EKG done at that time showed AFib 90s  · Heart rate has been controlled 70s and 80s      Coronary artery disease involving native coronary artery of native heart without angina pectoris  Assessment & Plan  Nuclear stress test 1/2020 was normal   Normal LV systolic function on nuclear stress test   · Continue Metoprolol, Lipitor  · Holding ASA given hemoptysis     Stage 2 chronic kidney disease  Assessment & Plan  Baseline creatinine 0 9-1 2s  · Creatinine stable 0 88  · Monitor renal function, avoid hypotension    Type 2 diabetes mellitus with complication, with long-term current use of insulin [facility])  Assessment & Plan  Lab Results   Component Value Date    HGBA1C 8 3 (H) 01/06/2020       Recent Labs     02/08/20  2108 02/09/20  0712 02/09/20  1109 02/09/20  1603   POCGLU 179* 162* 188* 183*       Blood Sugar Average: Last 72 hrs:  (P) 195 375     Suboptimal sugar control  · Holding metformin during admission  · Decreased Lantus to 50 units at HS (takes 55 units at home)  · Continue mealtime insulin 20 units TID  · Diabetic diet  · Patient follows endocrine Dr Stella Osorio as outpatient    Diabetic neuropathy [facility])  Assessment & Plan  · Continue Lyrica    Chronic systolic heart failure [facility])  Assessment & Plan  Patient with elevated ProBNP 820 on admission  Chest x-ray no pulmonary edema  CT chest - Small right and trace left pleural effusions  Cardiomegaly and left atrial enlargement  Trace pericardial effusion  Nuclear stress test in 1/2020 showed normal LV EF  2D echo shows EF of 40-45%, no regional wall motion abnormalities  Mild diffuse hypokinesis, mild concentric hypertrophy and mild mitral valve regurgitation  No significant changes from previous study performed August 2019 as per Cardiology report  · Most recent chest x-ray performed on 02/07 shows persistent right base pneumonia  · Daily weight and I&Os  · Continue to monitor     SHAVONNE treated with BiPAP  Assessment & Plan  Patient changed to auto BiPAP at HS with improved tolerance  Diarrhearesolved as of 2/6/2020  Assessment & Plan  Patient reported diarrhea uncontrollably at [facility] for past couple of days at home prior to admission  Denies antibiotic use in past 3 months  · Denies any diarrhea currently  · Continue probiotic BID    Dyslipidemia  Assessment & Plan  Continue Lipitor  Therapeutic lifestyle changes  Cardiac diet  Chronic reflux esophagitis  Assessment & Plan  Continue PPI    Obesity (BMI 30-39  9)  Assessment & Plan  Body mass index is 39 95 kg/m²  Diet and lifestyle modification    Bipolar affective disorder ([facility] [address] )  Assessment & Plan  · Continue Xanax, BuSpar, Seroquel, Zoloft  · Supportive care  VTE Pharmacologic Prophylaxis:   Pharmacologic: Pharmacologic VTE Prophylaxis contraindicated due to Hemoptysis  Mechanical VTE Prophylaxis in Place: Yes    Patient Centered Rounds: I have performed bedside rounds with nursing staff today      Discussions with Specialists or Other Care Team Provider:  Attending physician, pulmonary and multi disciplinary team     Education and Discussions with Family / Patient:  I spoke with the patient at the bedside, I have answered all questions to the best of my abilities  Time Spent for Care: 30 minutes  More than 50% of total time spent on counseling and coordination of care as described above  Current Length of Stay: 8 day(s)    Current Patient Status: Inpatient   Certification Statement: The patient will continue to require additional inpatient hospital stay due to Bronchoscopy, IV antibiotic  Discharge Plan:  Not stable for discharge today  Code Status: Level 1 - Full Code      Subjective:   Patient seen sitting comfortably in chair, denies headache, or dizziness  Feels that his breathing has improved  States that the coughing has lessened  Still does have periods of hemoptysis  Is NPO for bronchoscopy this afternoon  Objective:     Vitals:   Temp (24hrs), Av 2 °F (36 8 °C), Min:97 6 °F (36 4 °C), Max:98 9 °F (37 2 °C)    Temp:  [97 6 °F (36 4 °C)-98 9 °F (37 2 °C)] 97 6 °F (36 4 °C)  HR:  [67-85] 85  Resp:  [16-20] 18  BP: (115-141)/(70-85) 115/70  SpO2:  [89 %-96 %] 95 %  Body mass index is 37 96 kg/m²  Input and Output Summary (last 24 hours): Intake/Output Summary (Last 24 hours) at 2/10/2020 1138  Last data filed at 2020 2350  Gross per 24 hour   Intake    Output 2600 ml   Net -2600 ml       Physical Exam:     Physical Exam   Constitutional: He is oriented to person, place, and time  He appears well-nourished  No distress  HENT:   Head: Normocephalic  Eyes: Pupils are equal, round, and reactive to light  Conjunctivae and EOM are normal    Neck: Normal range of motion  Cardiovascular: Normal rate  An irregularly irregular rhythm present  Murmur heard  Pulmonary/Chest: Effort normal    Right base diminished  Patient reports cough has improved  Abdominal: Soft   Bowel sounds are normal  He exhibits no distension  There is no tenderness  Genitourinary:   Genitourinary Comments: Voiding spontaneously  Musculoskeletal: Normal range of motion  He exhibits no edema  Neurological: He is alert and oriented to person, place, and time  Skin: Skin is warm and dry  Capillary refill takes less than 2 seconds  Psychiatric: He has a normal mood and affect  His behavior is normal  Judgment and thought content normal    Nursing note and vitals reviewed  Additional Data:     Labs:    Results from last 7 days   Lab Units 02/10/20  0619  02/04/20  0522   WBC Thousand/uL 12 55*   < > 10 51*   HEMOGLOBIN g/dL 12 8   < > 13 2   HEMATOCRIT % 39 6   < > 39 7   PLATELETS Thousands/uL 287   < > 193   NEUTROS PCT %  --   --  62   LYMPHS PCT %  --   --  29   LYMPHO PCT % 26   < >  --    MONOS PCT %  --   --  6   MONO PCT % 12   < >  --    EOS PCT % 2   < > 1    < > = values in this interval not displayed  Results from last 7 days   Lab Units 02/10/20  0619   POTASSIUM mmol/L 4 3   CHLORIDE mmol/L 99*   CO2 mmol/L 30   BUN mg/dL 12   CREATININE mg/dL 0 88   CALCIUM mg/dL 8 5     Results from last 7 days   Lab Units 02/10/20  0619   INR  1 07       * I Have Reviewed All Lab Data Listed Above  * Additional Pertinent Lab Tests Reviewed: All Labs Within Last 24 Hours Reviewed    Imaging:    Imaging Reports Reviewed Today Include:  Chest x-ray  Imaging Personally Reviewed by Myself Includes:  None  Recent Cultures (last 7 days):     Results from last 7 days   Lab Units 02/06/20 2022 02/06/20  1324 02/06/20  1323   BLOOD CULTURE   --  No Growth at 72 hrs  No Growth at 72 hrs  SPUTUM CULTURE  1+ Growth of   Commensal respiratory bala only; No significant growth of Staph aureus/MRSA or Pseudomonas aeruginosa    --   --    GRAM STAIN RESULT  1+ Epithelial cells per low power field*  1+ Polys*  1+ Gram positive cocci in pairs*  --   --        Last 24 Hours Medication List:     Current Facility-Administered Medications:  acetaminophen 650 mg Oral Q6H PRN Cuikrishna Menjivarriasher, CRNP    acetylcysteine 3 mL Nebulization TID Rodrickminia Lucrecia, MANGO    ALPRAZolam 2 mg Oral HS Cuikrishna Batista, CRNP    atorvastatin 40 mg Oral Daily With Nelson & Tarun, CRNP    busPIRone 15 mg Oral BID Cuikrishna Menjivarrik, CRNP    cefTRIAXone 2,000 mg Intravenous Q24H Cuikrishna Batista, CRNP Last Rate: 2,000 mg (02/09/20 2100)   digoxin 250 mcg Oral Daily Cuikrishna Batista, CRNP    ergocalciferol 50,000 Units Oral Weekly Cuikrishna Batista, CRNP    fluticasone-vilanterol 1 puff Inhalation Daily Cuaustyn Batista, CRNP    guaiFENesin 1,200 mg Oral BID Raquel Singer PA-C    insulin glargine 50 Units Subcutaneous HS Priyankaalec Luis, CRNP    insulin lispro 1-5 Units Subcutaneous TID AC Cuaustyn Batista, CRNP    insulin lispro 1-5 Units Subcutaneous HS Cuikrishna Batista, CRNP    insulin lispro 20 Units Subcutaneous TID With Meals Breonna Batista, CRNP    ipratropium 0 5 mg Nebulization TID Breonna Batista, CRNP    levalbuterol 0 63 mg Nebulization Q4H PRN Breonna Batista, CRNP    levalbuterol 1 25 mg Nebulization TID Cuaustyn Batista, CRNP    lidocaine 1 patch Topical Daily Cuikrishna Batista, CRNP    losartan 50 mg Oral BID Cuikrishna Menjivarrik, CRNP    metoprolol succinate 200 mg Oral Daily Cuikrishna Batista, CRNP    ondansetron 4 mg Intravenous Q6H PRN Claudiaikrishna Batista, CRNP    pantoprazole 40 mg Oral HS Cuikrishna Menjivarrik, CRNP    pregabalin 50 mg Oral TID Cuikrishna Menjivarrik, CRNP    QUEtiapine 200 mg Oral QAM Cuikrishna Menjivarrik, CRNP    QUEtiapine 400 mg Oral HS Cuikrishna Menjivarrik, CRNP    saccharomyces boulardii 250 mg Oral BID Cuiyin Yurik, CRNP    sertraline 50 mg Oral HS MANAS Yoder    sodium chloride (PF) 3 mL Intravenous PRN MANAS Santana         Today, Patient Was Seen By: MANAS Harding    ** Please Note: Dictation voice to text software may have been used in the creation of this document   **

## 2020-02-10 NOTE — PLAN OF CARE
Problem: RESPIRATORY - ADULT  Goal: Achieves optimal ventilation and oxygenation  Description  INTERVENTIONS:  - Assess for changes in respiratory status  - Assess for changes in mentation and behavior  - Position to facilitate oxygenation and minimize respiratory effort  - Oxygen administered by appropriate delivery if ordered  Intiate bi-pap  Intiate bronchodilator therapy  Q 6 hours  - Encourage broncho-pulmonary hygiene including cough, deep breathe, Incentive Spirometry  - Assess the need for suctioning and aspirate as needed  - Assess and instruct to report SOB or any respiratory difficulty  - Respiratory Therapy support as indicated    Outcome: Progressing

## 2020-02-11 ENCOUNTER — APPOINTMENT (INPATIENT)
Dept: RADIOLOGY | Facility: HOSPITAL | Age: 60
DRG: 871 | End: 2020-02-11
Payer: MEDICARE

## 2020-02-11 VITALS
HEART RATE: 86 BPM | OXYGEN SATURATION: 94 % | HEIGHT: 70 IN | DIASTOLIC BLOOD PRESSURE: 69 MMHG | TEMPERATURE: 98 F | BODY MASS INDEX: 38.25 KG/M2 | WEIGHT: 267.2 LBS | RESPIRATION RATE: 18 BRPM | SYSTOLIC BLOOD PRESSURE: 115 MMHG

## 2020-02-11 DIAGNOSIS — E11.42 DIABETIC POLYNEUROPATHY ASSOCIATED WITH TYPE 2 DIABETES MELLITUS (HCC): ICD-10-CM

## 2020-02-11 PROBLEM — D72.825 BANDEMIA: Status: RESOLVED | Noted: 2020-02-06 | Resolved: 2020-02-11

## 2020-02-11 PROBLEM — J96.21 ACUTE ON CHRONIC RESPIRATORY FAILURE WITH HYPOXIA (HCC): Status: RESOLVED | Noted: 2020-02-02 | Resolved: 2020-02-11

## 2020-02-11 LAB
ANION GAP SERPL CALCULATED.3IONS-SCNC: 7 MMOL/L (ref 4–13)
BACTERIA BLD CULT: NORMAL
BACTERIA BLD CULT: NORMAL
BUN SERPL-MCNC: 10 MG/DL (ref 5–25)
CALCIUM SERPL-MCNC: 8.4 MG/DL (ref 8.3–10.1)
CHLORIDE SERPL-SCNC: 100 MMOL/L (ref 100–108)
CO2 SERPL-SCNC: 23 MMOL/L (ref 21–32)
CREAT SERPL-MCNC: 0.75 MG/DL (ref 0.6–1.3)
ERYTHROCYTE [DISTWIDTH] IN BLOOD BY AUTOMATED COUNT: 13.1 % (ref 11.6–15.1)
GFR SERPL CREATININE-BSD FRML MDRD: 100 ML/MIN/1.73SQ M
GLUCOSE SERPL-MCNC: 151 MG/DL (ref 65–140)
GLUCOSE SERPL-MCNC: 165 MG/DL (ref 65–140)
GLUCOSE SERPL-MCNC: 199 MG/DL (ref 65–140)
GLUCOSE SERPL-MCNC: 265 MG/DL (ref 65–140)
HCT VFR BLD AUTO: 42.2 % (ref 36.5–49.3)
HGB BLD-MCNC: 13.2 G/DL (ref 12–17)
MCH RBC QN AUTO: 31.4 PG (ref 26.8–34.3)
MCHC RBC AUTO-ENTMCNC: 31.3 G/DL (ref 31.4–37.4)
MCV RBC AUTO: 101 FL (ref 82–98)
PLATELET # BLD AUTO: 280 THOUSANDS/UL (ref 149–390)
PMV BLD AUTO: 9.6 FL (ref 8.9–12.7)
POTASSIUM SERPL-SCNC: 4.6 MMOL/L (ref 3.5–5.3)
RBC # BLD AUTO: 4.2 MILLION/UL (ref 3.88–5.62)
SODIUM SERPL-SCNC: 130 MMOL/L (ref 136–145)
WBC # BLD AUTO: 14.05 THOUSAND/UL (ref 4.31–10.16)

## 2020-02-11 PROCEDURE — 94668 MNPJ CHEST WALL SBSQ: CPT

## 2020-02-11 PROCEDURE — 94640 AIRWAY INHALATION TREATMENT: CPT

## 2020-02-11 PROCEDURE — 97110 THERAPEUTIC EXERCISES: CPT

## 2020-02-11 PROCEDURE — 71045 X-RAY EXAM CHEST 1 VIEW: CPT

## 2020-02-11 PROCEDURE — 94760 N-INVAS EAR/PLS OXIMETRY 1: CPT

## 2020-02-11 PROCEDURE — 82948 REAGENT STRIP/BLOOD GLUCOSE: CPT

## 2020-02-11 PROCEDURE — 99239 HOSP IP/OBS DSCHRG MGMT >30: CPT | Performed by: NURSE PRACTITIONER

## 2020-02-11 PROCEDURE — 97535 SELF CARE MNGMENT TRAINING: CPT

## 2020-02-11 PROCEDURE — 99231 SBSQ HOSP IP/OBS SF/LOW 25: CPT | Performed by: PHYSICIAN ASSISTANT

## 2020-02-11 PROCEDURE — 80048 BASIC METABOLIC PNL TOTAL CA: CPT | Performed by: NURSE PRACTITIONER

## 2020-02-11 PROCEDURE — 85027 COMPLETE CBC AUTOMATED: CPT | Performed by: NURSE PRACTITIONER

## 2020-02-11 RX ORDER — GUAIFENESIN 1200 MG/1
1200 TABLET, EXTENDED RELEASE ORAL 2 TIMES DAILY
Qty: 10 TABLET | Refills: 0 | Status: SHIPPED | OUTPATIENT
Start: 2020-02-11 | End: 2020-02-16

## 2020-02-11 RX ORDER — ACETAMINOPHEN 325 MG/1
650 TABLET ORAL EVERY 6 HOURS PRN
Qty: 30 TABLET | Refills: 0
Start: 2020-02-11

## 2020-02-11 RX ORDER — CEFTRIAXONE 2 G/50ML
2000 INJECTION, SOLUTION INTRAVENOUS ONCE
Status: COMPLETED | OUTPATIENT
Start: 2020-02-11 | End: 2020-02-11

## 2020-02-11 RX ADMIN — LIDOCAINE 1 PATCH: 50 PATCH TOPICAL at 06:04

## 2020-02-11 RX ADMIN — Medication 250 MG: at 08:26

## 2020-02-11 RX ADMIN — PREGABALIN 50 MG: 50 CAPSULE ORAL at 08:26

## 2020-02-11 RX ADMIN — LEVALBUTEROL HYDROCHLORIDE 1.25 MG: 1.25 SOLUTION, CONCENTRATE RESPIRATORY (INHALATION) at 13:19

## 2020-02-11 RX ADMIN — BUSPIRONE HYDROCHLORIDE 15 MG: 10 TABLET ORAL at 17:02

## 2020-02-11 RX ADMIN — Medication 250 MG: at 17:02

## 2020-02-11 RX ADMIN — IPRATROPIUM BROMIDE 0.5 MG: 0.5 SOLUTION RESPIRATORY (INHALATION) at 13:19

## 2020-02-11 RX ADMIN — INSULIN LISPRO 20 UNITS: 100 INJECTION, SOLUTION INTRAVENOUS; SUBCUTANEOUS at 08:31

## 2020-02-11 RX ADMIN — ATORVASTATIN CALCIUM 40 MG: 40 TABLET, FILM COATED ORAL at 17:02

## 2020-02-11 RX ADMIN — METOPROLOL SUCCINATE 200 MG: 100 TABLET, EXTENDED RELEASE ORAL at 08:28

## 2020-02-11 RX ADMIN — GUAIFENESIN 1200 MG: 600 TABLET, EXTENDED RELEASE ORAL at 17:02

## 2020-02-11 RX ADMIN — CEFTRIAXONE 2000 MG: 2 INJECTION, SOLUTION INTRAVENOUS at 12:22

## 2020-02-11 RX ADMIN — DIGOXIN 250 MCG: 125 TABLET ORAL at 08:28

## 2020-02-11 RX ADMIN — QUETIAPINE FUMARATE 200 MG: 200 TABLET, EXTENDED RELEASE ORAL at 08:29

## 2020-02-11 RX ADMIN — IPRATROPIUM BROMIDE 0.5 MG: 0.5 SOLUTION RESPIRATORY (INHALATION) at 07:35

## 2020-02-11 RX ADMIN — ACETYLCYSTEINE 600 MG: 200 SOLUTION ORAL; RESPIRATORY (INHALATION) at 07:35

## 2020-02-11 RX ADMIN — INSULIN LISPRO 1 UNITS: 100 INJECTION, SOLUTION INTRAVENOUS; SUBCUTANEOUS at 17:03

## 2020-02-11 RX ADMIN — INSULIN LISPRO 2 UNITS: 100 INJECTION, SOLUTION INTRAVENOUS; SUBCUTANEOUS at 12:20

## 2020-02-11 RX ADMIN — INSULIN LISPRO 20 UNITS: 100 INJECTION, SOLUTION INTRAVENOUS; SUBCUTANEOUS at 12:20

## 2020-02-11 RX ADMIN — GUAIFENESIN 1200 MG: 600 TABLET, EXTENDED RELEASE ORAL at 08:26

## 2020-02-11 RX ADMIN — FLUTICASONE FUROATE AND VILANTEROL TRIFENATATE 1 PUFF: 100; 25 POWDER RESPIRATORY (INHALATION) at 08:26

## 2020-02-11 RX ADMIN — INSULIN LISPRO 20 UNITS: 100 INJECTION, SOLUTION INTRAVENOUS; SUBCUTANEOUS at 17:03

## 2020-02-11 RX ADMIN — LEVALBUTEROL HYDROCHLORIDE 1.25 MG: 1.25 SOLUTION, CONCENTRATE RESPIRATORY (INHALATION) at 07:35

## 2020-02-11 RX ADMIN — PREGABALIN 50 MG: 50 CAPSULE ORAL at 17:02

## 2020-02-11 RX ADMIN — INSULIN LISPRO 1 UNITS: 100 INJECTION, SOLUTION INTRAVENOUS; SUBCUTANEOUS at 08:29

## 2020-02-11 RX ADMIN — BUSPIRONE HYDROCHLORIDE 15 MG: 10 TABLET ORAL at 08:27

## 2020-02-11 NOTE — ASSESSMENT & PLAN NOTE
Nuclear stress test 1/2020 was normal   Normal LV systolic function on nuclear stress test   · Continue Metoprolol, Lipitor  · Holding ASA given hemoptysis   · Will resume aspirin in 1 day after discussion with Pulmonary  · Hemoptysis has improved  · Follow-up with primary care physician in 1-2 weeks

## 2020-02-11 NOTE — ASSESSMENT & PLAN NOTE
Back to baseline 2 L ATC  Discussed w/ patient target oxygen saturation 89-92% and can uptitrate to 3-4 L as necessary but further upward titration should prompt call to pulmonology for evaluation  Has home oxygen concentrator

## 2020-02-11 NOTE — NURSING NOTE
Care Completed  Pt stable for discharge  AVS reviewed with Patient at bedside  All questions were answered  Vaccines are up to date  Prescription sent electronically to pharmacy on file  IV and jt removed   Patient discharge home in the care of [facility] transport team via stretcher

## 2020-02-11 NOTE — ASSESSMENT & PLAN NOTE
Patient presented with uncontrolled AFib  EKG on admission AFib, heart rate 125  Repeat EKG during admission with rate 80s  · Heart rate improved with home medications, continue Toprol  mg daily and digoxin 250 mcg daily  · Holding  Eliquis given continued hemoptysis; discussed with Pulmonary recommend holding Eliquis for additional day and then resume  · Heart rate has been controlled 70s and 80s  · Patient will follow up with his primary care physician in 1-2 weeks
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
## 2020-02-11 NOTE — OCCUPATIONAL THERAPY NOTE
OT Treatment Note       02/11/20 1118   Restrictions/Precautions   Other Precautions Fall Risk;O2   Pain Assessment   Pain Assessment No/denies pain   Pain Score No Pain   ADL   Grooming Assistance 5  Supervision/Setup   Grooming Deficit Wash/dry hands; Wash/dry face   Grooming Comments Standing unsupported to sink   Bed Mobility   Supine to Sit 5  Supervision   Sit to Supine 5  Supervision   Transfers   Sit to Stand 5  Supervision   Stand to Sit 5  Supervision   Stand pivot 5  Supervision   Additional items   (With RW)   Functional Mobility   Functional Mobility 5  Supervision   Additional Comments Patient ambulated short household distance to/from bathroom with RW and supervision, patient with one minimal posterior LOB while turning with RW in bathroom, able to self correct with supervision   Toilet Transfers   Toilet Transfer Type To and from   Toilet Transfer to Standard toilet   Toilet Transfer Technique Ambulating   Toilet Transfers Supervision   Toilet Transfers Comments with RW   Therapeutic Exercise - ROM   UE-ROM Yes   ROM- Right Upper Extremities   R Shoulder AROM; Flexion   R Elbow AROM;Elbow flexion;Elbow extension   R Wrist AROM; Wrist flexion;Wrist extension   R Position Seated   Equipment Dowel  (3 lb)   R Weight/Reps/Sets 10 reps x 2, breaks inbetween sets due to fatigue   ROM - Left Upper Extremities    L Shoulder AROM; Flexion   L Elbow AROM;Elbow flexion;Elbow extension   L Wrist AROM; Wrist flexion;Wrist extension   L Position Seated   Equipment Dowel  (3 lb)   L Weight/Reps/Sets 10 reps x 2   LUE ROM Comment 10 reps x 2, breaks inbetween sets due to fatigue   Cognition   Overall Cognitive Status [facility]   Arousal/Participation Alert; Cooperative   Attention Within functional limits   Orientation Level Oriented X4   Following Commands Follows all commands and directions without difficulty   Activity Tolerance   Activity Tolerance Patient limited by fatigue   Assessment   Assessment Patient seen for OT treatment session today  Patient is currently able to perform all functional mobility/transfers with RW and supervision  Patient limtied by fatigue, requiring rest breaks during functional activities  Patient educated on energy conservation techniques to utilize during ADL/iADL tasks, patient verbalized good understanding   Patient on 3L o2 via nasal cannula throughout session, SpO2 maintained 93-94% before, during, after activity   Recommendation   OT Discharge Recommendation Home OT   Licensure   [place] License Number  [place], [place] [id number]

## 2020-02-11 NOTE — ASSESSMENT & PLAN NOTE
BP stable  · Continue losartan, metoprolol  · Patient will follow up with his primary care physician in 1-2 weeks

## 2020-02-11 NOTE — ASSESSMENT & PLAN NOTE
Patient was already on steroid taper 30 -20 -10 mg PO daily for 3 days by his pulmonologist's office last week  · Prednisone discontinued  · Continue Trelegy inhaler at home  · Patient will follow up with Pulmonary later this week in the office  · Prior to admission patient was O2 dependent, has oxygen at home  · Patient also has nebulizer machine and nebulizer medication at home  · Discharged home today

## 2020-02-11 NOTE — ASSESSMENT & PLAN NOTE
Likely secondary to PNA, atelectasis  · Patient uses oxygen 2 L NC continuous, BiPAP 12/5 w/ 2 L at HS for SHAVONNE  · BiPAP transitioned to auto BiPAP - patient tolerating very well, indicates that he is sleeping much better  · Patient indicates that he has O2 at home  · Also indicated that he has nebulizer machine and nebulizer solution at home  · Status post bronchoscopy performed on 02/10  · As per Pulmonary very few secretions and no lesions noted during bronchoscopy  · Patient will follow up at the end of this week with Pulmonary  · Patient will be discharged home today

## 2020-02-11 NOTE — ASSESSMENT & PLAN NOTE
Patient with elevated ProBNP 820 on admission  Chest x-ray no pulmonary edema  CT chest - Small right and trace left pleural effusions  Cardiomegaly and left atrial enlargement  Trace pericardial effusion  Nuclear stress test in 1/2020 showed normal LV EF  2D echo shows EF of 40-45%, no regional wall motion abnormalities  Mild diffuse hypokinesis, mild concentric hypertrophy and mild mitral valve regurgitation  No significant changes from previous study performed August 2019 as per Cardiology report  · Most recent chest x-ray performed on 02/11 shows    Dulce Ilana · Encourage patient to weigh himself on a daily basis  · Follow-up with primary care physician in 1-2 weeks

## 2020-02-11 NOTE — DISCHARGE INSTRUCTIONS
Community Acquired Pneumonia   WHAT YOU NEED TO KNOW:   Community-acquired pneumonia (CAP) is a lung infection that you get outside of a hospital or nursing home setting  Your lungs become inflamed and cannot work well  CAP may be caused by bacteria, viruses, or fungi  DISCHARGE INSTRUCTIONS:   Seek care immediately if:   · You are confused and cannot think clearly  · You have increased trouble breathing  · Your lips or fingernails turn gray or blue  Contact your healthcare provider if:   · Your symptoms do not get better, or they get worse  · You are urinating less, or not at all  · You have questions or concerns about your condition or care  Medicines:   · Medicines  may be given to treat a bacterial, viral, or fungal infection  You may also be given medicines to dilate your bronchial tubes to help you breathe more easily  · Take your medicine as directed  Contact your healthcare provider if you think your medicine is not helping or if you have side effects  Tell him or her if you are allergic to any medicine  Keep a list of the medicines, vitamins, and herbs you take  Include the amounts, and when and why you take them  Bring the list or the pill bottles to follow-up visits  Carry your medicine list with you in case of an emergency  Follow up with your healthcare provider within 3 days or as directed: You may need another x-ray  Write down your questions so you remember to ask them during your visits  Deep breathing and coughing:  Deep breathing helps open the air passages in your lungs  Coughing helps bring up mucus from your lungs  Take a deep breath and hold the breath as long as you can  Then push the air out of your lungs with a deep, strong cough  Spit out any mucus you have coughed up  Take 10 deep breaths in a row every hour that you are awake  Remember to follow each deep breath with a cough     Do not smoke or allow others to smoke around you:  Nicotine and other chemicals in cigarettes and cigars can cause lung damage  Ask your healthcare provider for information if you currently smoke and need help to quit  E-cigarettes or smokeless tobacco still contain nicotine  Talk to your healthcare provider before you use these products  Manage CAP at home:   · Breathe warm, moist air  This helps loosen mucus  Loosely place a warm, wet washcloth over your nose and mouth  A room humidifier may also help make the air moist     · Drink liquids as directed  Ask your healthcare provider how much liquid to drink each day and which liquids to drink  Liquids help make mucus thin and easier to get out of your body  · Gently tap your chest   This helps loosen mucus so it is easier to cough  Lie with your head lower than your chest several times a day and tap your chest      · Get plenty of rest   Rest helps your body heal   Prevent CAP:   · Wash your hands often with soap and water  Carry germ-killing hand gel with you  You can use the gel to clean your hands when soap and water are not available  Do not touch your eyes, nose, or mouth unless you have washed your hands first      · Clean surfaces often  Clean doorknobs, countertops, cell phones, and other surfaces that are touched often  · Always cover your mouth when you cough  Cough into a tissue or your shirtsleeve so you do not spread germs from your hands  · Try to avoid people who have a cold or the flu  If you are sick, stay away from others as much as possible  · Ask about vaccines  You may need a vaccine to help prevent pneumonia  Get an influenza (flu) vaccine every year as soon as it becomes available  © 2017 [facility] Information is for End User's use only and may not be sold, redistributed or otherwise used for commercial purposes  All illustrations and images included in CareNotes® are the copyrighted property of [employer]  or Robert Gale[employer]    The above information is an  only  It is not intended as medical advice for individual conditions or treatments  Talk to your doctor, nurse or pharmacist before following any medical regimen to see if it is safe and effective for you

## 2020-02-11 NOTE — ASSESSMENT & PLAN NOTE
Patient changed to auto BiPAP at  with improved tolerance  Patient will be following up with Pulmonary later this week outpatient

## 2020-02-11 NOTE — ASSESSMENT & PLAN NOTE
Patient presents with fever, cough, SOB  CT chest - Right basilar consolidation with air bronchograms  Left basilar relaxation atelectasis  Flu/RSV negative  · Completed 5 day course of Azithromycin  · Completed Rocephin 2 gm IV daily (weight based), day #10 as per discussion with pulmonary,  · Continue Atrovent/Xopenex TID, Xopenex PRN, Mucinex  · Encourage patient to continue using incentive spirometer and Acapella valve upon discharged home  · Pulmonary will see patient later this week for follow-up appointment  · Repeat chest x-ray performed on 02/11 shows    Lena Joe · Patient had been complaining of hemoptysis during hospitalization, this has improved  · Pulmonary performed bronchoscopy on 02/10, minimal secretions noted, no bleeding and no lesions noted during bronchoscopy as per Pulmonary  · Patient will be discharged home today  · Follow-up with primary care physician in 1-2 weeks

## 2020-02-11 NOTE — ASSESSMENT & PLAN NOTE
POA   A/e/b fever 102 1, tachycardia, leukocytosis, with source of infection RLL PNA  Lactic acid 2 7  Normalized after receiving normal saline 1000 mL in ED  · Initial Blood cultures: one of two sets + for gram + cocci; suspect contamination  · Repeat set of blood cultures: no growth at 72 hours  · Sputum cx: mixed resp bala  · MRSA screen negative  · Procal 4 -> 0 4  · Resolved

## 2020-02-11 NOTE — ASSESSMENT & PLAN NOTE
Lab Results   Component Value Date    HGBA1C 8 3 (H) 01/06/2020       Recent Labs     02/10/20  1149 02/10/20  1630 02/10/20  2110 02/11/20  0739   POCGLU 174* 162* 222* 151*       Blood Sugar Average: Last 72 hrs:  (P) 188 [id number]       · Will resume patient's home medication of metformin on discharge  · Resume Lantus 55 units q h s  At home  · Continue mealtime insulin 20 units TID  · Patient also follows a sliding scale coverage with NovoLog at home  · Encourage patient to follow diabetic diet  · Encourage therapeutic lifestyle changes including weight loss and exercise    · Patient follows endocrine Dr Epps Cos as outpatient

## 2020-02-11 NOTE — ASSESSMENT & PLAN NOTE
· Continue Xanax, BuSpar, Seroquel, Zoloft  · Supportive care    · Follow-up with primary care physician

## 2020-02-11 NOTE — ASSESSMENT & PLAN NOTE
Sodium 130 on admission  Acute on chronic  Baseline sodium appears to be 134-135, likely due to psych medications + PNA  Na stable now at baseline, monitor  Sodium currently 130, corrected 132  Discussed with patient need for fluid restriction  Repeat BMP at the end of this week  Follow-up with primary care physician

## 2020-02-11 NOTE — ASSESSMENT & PLAN NOTE
Body mass index is 38 34 kg/m²  Diet and lifestyle modification  Follow-up with primary care physician

## 2020-02-11 NOTE — TRANSPORTATION MEDICAL NECESSITY
Section I - General Information    Name of Patient: Jackqueline Schilder                 : 1960    Medicare #: [id number]  Transport Date: 20 (PCS is valid for round trips on this date and for all repetitive trips in the 60-day range as noted below )  Origin: [facility] [address]                                                         Destination:  [address], [address], [facility]  Is the pt's stay covered under Medicare Part A (PPS/DRG)   [x]     Closest appropriate facility? If no, why is transport to more distant facility required? Yes  If hospice pt, is this transport related to pt's terminal illness? No       Section II - Medical Necessity Questionnaire  Ambulance transportation is medically necessary only if other means of transport are contraindicated or would be potentially harmful to the patient  To meet this requirement, the patient must either be "bed confined" or suffer from a condition such that transport by means other than ambulance is contraindicated by the patient's condition  The following questions must be answered by the medical professional signing below for this form to be valid:    1)  Describe the MEDICAL CONDITION (physical and/or mental) of this patient AT [address] [place] [address] that requires the patient to be transported in an ambulance and why transport by other means is contraindicated by the patient's condition:  Severe COPD, morbid obesity, severe sepsis, pneumonia    2) Is the patient "bed confined" as defined below? Yes  To be "be confined" the patient must satisfy all three of the following conditions: (1) unable to get up from bed without Assistance; AND (2) unable to ambulate; AND (3) unable to sit in a chair or wheelchair  3) Can this patient safely be transported by car or wheelchair van (i e , seated during transport without a medical attendant or monitoring)?    No    4) In addition to completing questions 1-3 above, please check any of the following conditions that apply*:   *Note: supporting documentation for any boxes checked must be maintained in the patient's medical records  If hosp-hosp transfer, describe services needed at 2nd facility not available at 1st facility? Moderate/severe pain on movement   Medical attendant required   Requires oxygen-unable to self administer  Unable to tolerate seated position for time needed to transport   Unable to sit in a chair or wheelchair due to decubitus ulcers or other wounds       Section III - Signature of Physician or Healthcare Professional  I certify that the above information is true and correct based on my evaluation of this patient, and represent that the patient requires transport by ambulance and that other forms of transport are contraindicated  I understand that this information will be used by the Centers for Medicare and Medicaid Services (CMS) to support the determination of medical necessity for ambulance services, and I represent that I have personal knowledge of the patient's condition at time of transport  []  If this box is checked, I also certify that the patient is physically or mentally incapable of signing the ambulance service's claim and that the institution with which I am affiliated has furnished care, services, or assistance to the patient  My signature below is made on behalf of the patient pursuant to 42 CFR §424 36(b)(4)   In accordance with 42 CFR §424 37, the specific reason(s) that the patient is physically or mentally incapable of signing the claim form is as follows:       Signature of Physician* or Healthcare Professional__Chloe Shah____________________________________________________________  Signature Date 02/11/20 (For scheduled repetitive transports, this form is not valid for transports performed more than 60 days after this date)    Printed Name & Credentials of Physician or Healthcare Professional (MD, , RN, etc )________________________________  *Form must be signed by patient's attending physician for scheduled, repetitive transports   For non-repetitive, unscheduled ambulance transports, if unable to obtain the signature of the attending physician, any of the following may sign (choose appropriate option below)  [] Physician Assistant []  Clinical Nurse Specialist []  Registered Nurse  []  Nurse Practitioner  [x] Discharge Planner

## 2020-02-11 NOTE — DISCHARGE SUMMARY
Discharge- Eli Drummond 1960, 60 y o  male MRN: [id number]    Unit/Bed#: [address] [place] [address] Encounter: [id number]    Primary Care Provider: Gentry Hooper MD   Date and time admitted to hospital: 2/2/2020  6:55 PM        * Acute on chronic respiratory failure with hypoxia [facility])  Assessment & Plan  Likely secondary to PNA, atelectasis  · Patient uses oxygen 2 L NC continuous, BiPAP 12/5 w/ 2 L at HS for SHAVONNE  · BiPAP transitioned to auto BiPAP - patient tolerating very well, indicates that he is sleeping much better  · Patient indicates that he has O2 at home  · Also indicated that he has nebulizer machine and nebulizer solution at home  · Status post bronchoscopy performed on 02/10  · As per Pulmonary very few secretions and no lesions noted during bronchoscopy  · Patient will follow up at the end of this week with Pulmonary  · Patient will be discharged home today  Severe sepsis ([facility] )  Assessment & Plan  POA  A/e/b fever 102 1, tachycardia, leukocytosis, with source of infection RLL PNA  Lactic acid 2 7  Normalized after receiving normal saline 1000 mL in ED  · Initial Blood cultures: one of two sets + for gram + cocci; suspect contamination  · Repeat set of blood cultures: no growth at 72 hours  · Sputum cx: mixed resp bala  · MRSA screen negative  · Procal 4 -> 0 4  · Resolved  COPD (chronic obstructive pulmonary disease) ([facility] )  Assessment & Plan  Patient was already on steroid taper 30 -20 -10 mg PO daily for 3 days by his pulmonologist's office last week  · Prednisone discontinued  · Continue Trelegy inhaler at home  · Patient will follow up with Pulmonary later this week in the office  · Prior to admission patient was O2 dependent, has oxygen at home  · Patient also has nebulizer machine and nebulizer medication at home  · Discharged home today      Community acquired pneumonia  Assessment & Plan  Patient presents with fever, cough, SOB  CT chest - Right basilar consolidation with air bronchograms  Left basilar relaxation atelectasis  Flu/RSV negative  · Completed 5 day course of Azithromycin  · Completed Rocephin 2 gm IV daily (weight based), day #10 as per discussion with pulmonary,  · Continue Atrovent/Xopenex TID, Xopenex PRN, Mucinex  · Encourage patient to continue using incentive spirometer and Acapella valve upon discharged home  · Pulmonary will see patient later this week for follow-up appointment  · Repeat chest x-ray performed on 02/11 shows    Felton Fisher · Patient had been complaining of hemoptysis during hospitalization, this has improved  · Pulmonary performed bronchoscopy on 02/10, minimal secretions noted, no bleeding and no lesions noted during bronchoscopy as per Pulmonary  · Patient will be discharged home today  · Follow-up with primary care physician in 1-2 weeks  Hyponatremia  Assessment & Plan  Sodium 130 on admission  Acute on chronic  Baseline sodium appears to be 134-135, likely due to psych medications + PNA  Na stable now at baseline, monitor  Sodium currently 130, corrected 132  Discussed with patient need for fluid restriction  Repeat BMP at the end of this week  Follow-up with primary care physician  Benign essential hypertension  Assessment & Plan  BP stable  · Continue losartan, metoprolol  · Patient will follow up with his primary care physician in 1-2 weeks  Chronic a-fib  Assessment & Plan  Patient presented with uncontrolled AFib  EKG on admission AFib, heart rate 125  Repeat EKG during admission with rate 80s  · Heart rate improved with home medications, continue Toprol  mg daily and digoxin 250 mcg daily  · Holding  Eliquis given continued hemoptysis; discussed with Pulmonary recommend holding Eliquis for additional day and then resume  · Heart rate has been controlled 70s and 80s  · Patient will follow up with his primary care physician in 1-2 weeks      Coronary artery disease involving native coronary artery of native heart without angina pectoris  Assessment & Plan  Nuclear stress test 1/2020 was normal   Normal LV systolic function on nuclear stress test   · Continue Metoprolol, Lipitor  · Holding ASA given hemoptysis   · Will resume aspirin in 1 day after discussion with Pulmonary  · Hemoptysis has improved  · Follow-up with primary care physician in 1-2 weeks  Stage 2 chronic kidney disease  Assessment & Plan  Baseline creatinine 0 9-1 2s  · Creatinine stable 0 75  · Follow-up with primary care physician  Type 2 diabetes mellitus with complication, with long-term current use of insulin [facility])  Assessment & Plan  Lab Results   Component Value Date    HGBA1C 8 3 (H) 01/06/2020       Recent Labs     02/10/20  1149 02/10/20  1630 02/10/20  2110 02/11/20  0739   POCGLU 174* 162* 222* 151*       Blood Sugar Average: Last 72 hrs:  (P) 188 [id number]       · Will resume patient's home medication of metformin on discharge  · Resume Lantus 55 units q h s  At home  · Continue mealtime insulin 20 units TID  · Patient also follows a sliding scale coverage with NovoLog at home  · Encourage patient to follow diabetic diet  · Encourage therapeutic lifestyle changes including weight loss and exercise  · Patient follows endocrine Dr Lucio Mayorga as outpatient    Diabetic neuropathy [facility])  Assessment & Plan  · Continue Lyrica    Chronic systolic heart failure [facility])  Assessment & Plan  Patient with elevated ProBNP 820 on admission  Chest x-ray no pulmonary edema  CT chest - Small right and trace left pleural effusions  Cardiomegaly and left atrial enlargement  Trace pericardial effusion  Nuclear stress test in 1/2020 showed normal LV EF  2D echo shows EF of 40-45%, no regional wall motion abnormalities  Mild diffuse hypokinesis, mild concentric hypertrophy and mild mitral valve regurgitation  No significant changes from previous study performed August 2019 as per Cardiology report    · Most recent chest x-ray performed on 02/11 shows    Darletta Pac · Encourage patient to weigh himself on a daily basis  · Follow-up with primary care physician in 1-2 weeks  SHAVONNE treated with BiPAP  Assessment & Plan  Patient changed to auto BiPAP at HS with improved tolerance  Patient will be following up with Pulmonary later this week outpatient  Dyslipidemia  Assessment & Plan  Continue Lipitor  Therapeutic lifestyle changes  Cardiac diet  Chronic reflux esophagitis  Assessment & Plan  Continue PPI  Encourage upright position for at least 30 minutes post meals  Obesity (BMI 30-39  9)  Assessment & Plan  Body mass index is 38 34 kg/m²  Diet and lifestyle modification  Follow-up with primary care physician  Bipolar affective disorder ([facility] )  Assessment & Plan  · Continue Xanax, BuSpar, Seroquel, Zoloft  · Supportive care  · Follow-up with primary care physician           Discharging Physician / Practitioner: MANAS Hunter  PCP: Sabi Blackwell MD  Admission Date:   Admission Orders (From admission, onward)     Ordered        02/02/20 2255  Inpatient Admission  Once                   Discharge Date: 02/11/20    Resolved Problems  Date Reviewed: 2/11/2020          Resolved    Diarrhea 2/6/2020     Resolved by  Ene Jack MD          Consultations During Hospital Stay:  · Pulmonary    Procedures Performed:     · Bronchoscopy performed 2/10    Significant Findings / Test Results:     · Chest x-ray performed 2/2 showed stable cardiomegaly  · CT of chest and abdomen performed on 02/02 shows right basilar consolidation, left basilar relaxation atelectasis  Small trace and trace left pleural effusions, cardiomegaly a, left atrial enlargement and trace per cardial effusion as per radiology report  · Chest x-ray performed on 02/05 shows bilateral pleural effusions, consolidative changes right lower lobe, atelectasis versus infiltrate as per Radiology    · Chest x-ray performed 2/6 shows persistent right lower lobe consolidation/atelectasis with suspected small effusions as per Radiology  · Chest x-ray performed 2/7 shows persistent right base pneumonia as per Radiology  · Chest x-ray performed 2/11 shows    Incidental Findings:   · See above  Test Results Pending at Discharge (will require follow up): · None  Outpatient Tests Requested:  · BMP and CBC to be performed at end of this week  Complications:  None  Reason for Admission:  Cough, fever and reported difficulty breathing for 1 week prior to admission  Hospital Course:     Amairani Seymour is a 60 y o  male patient past medical history of Chronic obstrucive pulmonary disease, chronic hypoxic respiratory failure on home O2 at 2 L, sleep apnea, CAD, chronic atrial fibrillation, type 2 diabetes, hypertension, hyperlipidemia, bipolar and CKD who originally presented to the hospital on 2/2/2020 due to cough, fever and difficulty breathing for 1 week prior to admission  Patient reported that prior to admission he had seen his pulmonologist and was placed on a prednisone taper  He was compliant with that however reported no improvement in symptoms  He reported having chills at home, and chronic right-sided chest pain when coughing  Reports that he has had pneumonia several times in the past   He presented to the emergency department for further evaluation and treatment  In the ER chest x-ray showed no cardiomegaly  CT of the chest was also performed which showed right basilar consolidation with air bronchograms, left basilar relaxation atelectasis  Patient was admitted with community-acquired pneumonia, received IV Rocephin and Zithromax, Rocephin weight based at 2 g; patient received a total of 10 days worth of Rocephin, 5 days of azithromycin  Pulmonary was consulted, patient had complained of experiencing hemoptysis during hospitalization    He has blood thinners including Eliquis and aspirin were stopped temporarily  Bronchoscopy was performed on 02/10 which showed minimal secretions, no lesions and no active bleeding  Patient will resume his Eliquis an aspirin on Wednesday, February 12th  Patient was noted to have periods of hyponatremia, on day of discharge sodium 130, corrected 132  Did discuss with patient need for fluid restriction in light of recent pneumonia and also psychiatric medications  The patient will be discharged home today  Did discuss with Pulmonary, and they will have him be seen in the office later this week  Patient will also be given prescription for outpatient blood work including BMP and CBC later in the week  Patient is advised to follow up with his primary care physician in 1-2 weeks  He also follows with outpatient endocrinology for his diabetes  Patient indicated that he does have oxygen at home and has adequate supply, also has nebulizer machine with nebulizer solution  During hospitalization the patient's BiPAP machine was reprogrammed so it is in apap mode; patient reports that he is sleeping much better with this mode  Please see above list of diagnoses and related plan for additional information  Condition at Discharge: good     Discharge Day Visit / Exam:     Subjective:  Patient sitting up on edge of bed, reports that he feels very good and is hopeful that he will go home today  Reports that his cough is less, and there is last blood noted when he is expectorating  He denies headache, dizziness, shortness of breath, abdominal pain, nausea, vomiting or diarrhea      Vitals: Blood Pressure: 112/64 (02/11/20 0827)  Pulse: 95 (02/11/20 0827)  Temperature: 98 5 °F (36 9 °C) (02/11/20 0800)  Temp Source: Oral (02/11/20 0800)  Respirations: 18 (02/11/20 0800)  Height: 5' 10" (177 8 cm) (02/02/20 2245)  Weight - Scale: 121 kg (267 lb 3 2 oz) (02/11/20 0600)  SpO2: 95 % (02/11/20 0827)     Exam:   Physical Exam   Constitutional: He is oriented to person, place, and time  He appears well-nourished  No distress  HENT:   Head: Normocephalic  Eyes: Pupils are equal, round, and reactive to light  Conjunctivae and EOM are normal    Neck: Normal range of motion  Cardiovascular: Normal rate  An irregularly irregular rhythm present  Pulmonary/Chest: Effort normal    Lungs diminished at bases  Reports cough is less, less hemoptysis  Abdominal: Soft  Bowel sounds are normal  He exhibits no distension  There is no tenderness  Abdomen obese  Genitourinary:   Genitourinary Comments: Voiding spontaneously  Musculoskeletal: Normal range of motion  He exhibits no edema  Neurological: He is alert and oriented to person, place, and time  Skin: Skin is warm and dry  Capillary refill takes less than 2 seconds  Psychiatric: He has a normal mood and affect  His behavior is normal  Judgment and thought content normal    Nursing note and vitals reviewed  Discussion with Family:  I spoke with the patient at the bedside, I have answered all questions to the best of my abilities  Discharge instructions/Information to patient and family:   See after visit summary for information provided to patient and family  Provisions for Follow-Up Care:  See after visit summary for information related to follow-up care and any pertinent home health orders  Disposition:     Home    For Discharges to [facility] SNF:   · Not Applicable to this Patient - Not Applicable to this Patient    Planned Readmission:  No      Discharge Statement:  I spent greater than 30 minutes discharging the patient  This time was spent on the day of discharge  I had direct contact with the patient on the day of discharge  Greater than 50% of the total time was spent examining patient, answering all patient questions, arranging and discussing plan of care with patient as well as directly providing post-discharge instructions    Additional time then spent on discharge activities  Discharge Medications:  See after visit summary for reconciled discharge medications provided to patient and family        ** Please Note: This note has been constructed using a voice recognition system **

## 2020-02-11 NOTE — SOCIAL WORK
Discharge order in  [facility] due to continuous O2  Patient, nurse, DAKOTAH made aware

## 2020-02-11 NOTE — PLAN OF CARE
Problem: Potential for Falls  Goal: Patient will remain free of falls  Description  INTERVENTIONS:  - Assess patient frequently for physical needs  -  Identify cognitive and physical deficits and behaviors that affect risk of falls    -  [place] fall precautions as indicated by assessment   - Educate patient/family on patient safety including physical limitations  - Instruct patient to call for assistance with activity based on assessment  - Modify environment to reduce risk of injury  - Consider OT/PT consult to assist with strengthening/mobility  Outcome: Progressing     Problem: Prexisting or High Potential for Compromised Skin Integrity  Goal: Skin integrity is maintained or improved  Description  INTERVENTIONS:  - Identify patients at risk for skin breakdown  - Assess and monitor skin integrity  - Assess and monitor nutrition and hydration status  - Monitor labs   - Assist with mobility/ambulation  - Relieve pressure over bony prominences  - Avoid friction and shearing  - Provide appropriate hygiene as needed including keeping skin clean and dry  - Evaluate need for skin moisturizer/barrier cream  - Collaborate with interdisciplinary team   - Patient/family teaching  - Consider wound care consult    Outcome: Progressing     Problem: RESPIRATORY - ADULT  Goal: Achieves optimal ventilation and oxygenation  Description  INTERVENTIONS:  - Assess for changes in respiratory status  - Assess for changes in mentation and behavior  - Position to facilitate oxygenation and minimize respiratory effort  - Oxygen administered by appropriate delivery if ordered  Intiate bi-pap  Intiate bronchodilator therapy  Q 6 hours  - Encourage broncho-pulmonary hygiene including cough, deep breathe, Incentive Spirometry  - Assess the need for suctioning and aspirate as needed  - Assess and instruct to report SOB or any respiratory difficulty  - Respiratory Therapy support as indicated    Outcome: Progressing

## 2020-02-12 ENCOUNTER — PATIENT OUTREACH (OUTPATIENT)
Dept: CASE MANAGEMENT | Facility: OTHER | Age: 60
End: 2020-02-12

## 2020-02-12 ENCOUNTER — TRANSITIONAL CARE MANAGEMENT (OUTPATIENT)
Dept: FAMILY MEDICINE CLINIC | Facility: CLINIC | Age: 60
End: 2020-02-12

## 2020-02-12 DIAGNOSIS — E11.42 DIABETIC POLYNEUROPATHY ASSOCIATED WITH TYPE 2 DIABETES MELLITUS (HCC): ICD-10-CM

## 2020-02-12 LAB
BACTERIA BRONCH AEROBE CULT: NORMAL
GRAM STN SPEC: NORMAL

## 2020-02-12 NOTE — PROGRESS NOTES
Per [facility], they are waiting on doctor's orders to schedule the patient for [address] to make them aware  They will check with his PCP  Number for [facility] intake, & myself, provided  Bj Bullard from [facility] called back to say that they have signed [address] orders & they faxed them to [facility]

## 2020-02-13 ENCOUNTER — EPISODE CHANGES (OUTPATIENT)
Dept: CASE MANAGEMENT | Facility: HOSPITAL | Age: 60
End: 2020-02-13

## 2020-02-13 ENCOUNTER — TELEPHONE (OUTPATIENT)
Dept: FAMILY MEDICINE CLINIC | Facility: CLINIC | Age: 60
End: 2020-02-13

## 2020-02-13 DIAGNOSIS — E11.42 DIABETIC POLYNEUROPATHY ASSOCIATED WITH TYPE 2 DIABETES MELLITUS (HCC): ICD-10-CM

## 2020-02-13 NOTE — TELEPHONE ENCOUNTER
Dr Brien Prieto pt would like to have scripts for home health aide , pt,ot therapy   pls fax order to [phone]

## 2020-02-17 ENCOUNTER — PATIENT OUTREACH (OUTPATIENT)
Dept: CASE MANAGEMENT | Facility: OTHER | Age: 60
End: 2020-02-17

## 2020-02-17 NOTE — PROGRESS NOTES
Went for home visit  Patient doing well  Has all medication and is taking properly  Uses oxygen at all times, uses nebulizer daily and cpap at night for sleep  Has all follow up appointments scheduled and transport with [facility]  Patient has [facility] nurse, pt and ot  CHW and patient waiting on [facility] to start so patient can get HHA, delivered meals and lifeline  CHW will continue to assist as needed  CHW advised patient next appointment will be the last  Patient agreeable and cm aware of closure

## 2020-02-17 NOTE — PROGRESS NOTES
Attempted outreach  Left a voicemail requesting a call back, phone number provided  Got a call back from the patient's number that rang once  Called the number back right away, but no answer

## 2020-02-17 NOTE — TELEPHONE ENCOUNTER
Dr Juan Carlos Donaldson    [facility] saying that they will not cover lyrica med as it needs prior authorization  Patient is scheduled for TCM this Friday 2/21  I advised patient that Dr Juan Carlos Donaldson is not in the office today

## 2020-02-18 ENCOUNTER — LAB REQUISITION (OUTPATIENT)
Dept: LAB | Facility: HOSPITAL | Age: 60
End: 2020-02-18
Payer: MEDICARE

## 2020-02-18 DIAGNOSIS — E87.1 HYPO-OSMOLALITY AND HYPONATREMIA: ICD-10-CM

## 2020-02-18 DIAGNOSIS — J18.9 PNEUMONIA, UNSPECIFIED ORGANISM: ICD-10-CM

## 2020-02-18 LAB
ANION GAP SERPL CALCULATED.3IONS-SCNC: 11 MMOL/L (ref 4–13)
BUN SERPL-MCNC: 16 MG/DL (ref 5–25)
CALCIUM SERPL-MCNC: 9.4 MG/DL (ref 8.3–10.1)
CHLORIDE SERPL-SCNC: 99 MMOL/L (ref 100–108)
CO2 SERPL-SCNC: 27 MMOL/L (ref 21–32)
CREAT SERPL-MCNC: 0.83 MG/DL (ref 0.6–1.3)
ERYTHROCYTE [DISTWIDTH] IN BLOOD BY AUTOMATED COUNT: 13.2 % (ref 11.6–15.1)
GFR SERPL CREATININE-BSD FRML MDRD: 96 ML/MIN/1.73SQ M
GLUCOSE SERPL-MCNC: 222 MG/DL (ref 65–140)
HCT VFR BLD AUTO: 42.3 % (ref 36.5–49.3)
HGB BLD-MCNC: 14.5 G/DL (ref 12–17)
MCH RBC QN AUTO: 31.9 PG (ref 26.8–34.3)
MCHC RBC AUTO-ENTMCNC: 34.3 G/DL (ref 31.4–37.4)
MCV RBC AUTO: 93 FL (ref 82–98)
PLATELET # BLD AUTO: 238 THOUSANDS/UL (ref 149–390)
PMV BLD AUTO: 10 FL (ref 8.9–12.7)
POTASSIUM SERPL-SCNC: 4.5 MMOL/L (ref 3.5–5.3)
RBC # BLD AUTO: 4.54 MILLION/UL (ref 3.88–5.62)
SODIUM SERPL-SCNC: 137 MMOL/L (ref 136–145)
WBC # BLD AUTO: 8.07 THOUSAND/UL (ref 4.31–10.16)

## 2020-02-18 PROCEDURE — 80048 BASIC METABOLIC PNL TOTAL CA: CPT | Performed by: INTERNAL MEDICINE

## 2020-02-18 PROCEDURE — 85027 COMPLETE CBC AUTOMATED: CPT | Performed by: INTERNAL MEDICINE

## 2020-02-19 NOTE — TELEPHONE ENCOUNTER
CALLED PT TO GET P A  INFO SINCE WE REC'D NOTHING FROM [facility]  [facility] , PT SAID HE CALLED DR EASON OFFICE TO DO THE P A   AND THEY REFUSED AND ADVISED HIM TO CALL HIS PCP, PT HAS TO LOCATE THE DENIAL LETTER TO GIVE ME THE INFO TO CALL

## 2020-02-21 ENCOUNTER — PATIENT OUTREACH (OUTPATIENT)
Dept: CASE MANAGEMENT | Facility: OTHER | Age: 60
End: 2020-02-21

## 2020-02-21 ENCOUNTER — OFFICE VISIT (OUTPATIENT)
Dept: FAMILY MEDICINE CLINIC | Facility: CLINIC | Age: 60
End: 2020-02-21
Payer: MEDICARE

## 2020-02-21 ENCOUNTER — TELEPHONE (OUTPATIENT)
Dept: FAMILY MEDICINE CLINIC | Facility: CLINIC | Age: 60
End: 2020-02-21

## 2020-02-21 VITALS
SYSTOLIC BLOOD PRESSURE: 100 MMHG | BODY MASS INDEX: 37.56 KG/M2 | DIASTOLIC BLOOD PRESSURE: 70 MMHG | RESPIRATION RATE: 16 BRPM | TEMPERATURE: 96.5 F | HEIGHT: 70 IN | WEIGHT: 262.4 LBS | HEART RATE: 76 BPM

## 2020-02-21 DIAGNOSIS — E11.65 UNCONTROLLED TYPE 2 DIABETES MELLITUS WITH HYPERGLYCEMIA (HCC): ICD-10-CM

## 2020-02-21 DIAGNOSIS — E66.2 CLASS 3 OBESITY WITH ALVEOLAR HYPOVENTILATION, SERIOUS COMORBIDITY, AND BODY MASS INDEX (BMI) OF 40.0 TO 44.9 IN ADULT (HCC): ICD-10-CM

## 2020-02-21 DIAGNOSIS — IMO0001 TRANSITION OF CARE PERFORMED WITH SHARING OF CLINICAL SUMMARY: Primary | ICD-10-CM

## 2020-02-21 DIAGNOSIS — J18.9 PNEUMONIA OF RIGHT LOWER LOBE DUE TO INFECTIOUS ORGANISM: ICD-10-CM

## 2020-02-21 DIAGNOSIS — A41.9 SEPSIS DUE TO UNDETERMINED ORGANISM (HCC): ICD-10-CM

## 2020-02-21 PROCEDURE — 99495 TRANSJ CARE MGMT MOD F2F 14D: CPT | Performed by: FAMILY MEDICINE

## 2020-02-21 RX ORDER — ALBUTEROL SULFATE 2.5 MG/3ML
2.5 SOLUTION RESPIRATORY (INHALATION) EVERY 6 HOURS PRN
COMMUNITY
End: 2021-05-14 | Stop reason: SDUPTHER

## 2020-02-21 RX ORDER — GABAPENTIN 100 MG/1
100 CAPSULE ORAL 3 TIMES DAILY
Qty: 90 CAPSULE | Refills: 3 | Status: SHIPPED | OUTPATIENT
Start: 2020-02-21 | End: 2020-06-05

## 2020-02-21 NOTE — PROGRESS NOTES
Per [facility], the patient is current for SN services  He has referrals for PT and OT  His [facility] RN case manager is Hanh Hawley

## 2020-02-24 NOTE — PROGRESS NOTES
Patient denies fever, increased SOB, aches, chills, n/v, diarrhea, or chest pain  He has a productive cough of a "little milky sputum"  C/o general weakness especially in BLLE  He is working with therapy & walked a short distance [place] with his rollator  OT also provided supervision for his shower today  He wears his O2 continuously, provided by [employer], & states compliance with his BiPAP  He will call [employer] for transport to pulmonology next month & he will reschedule with endocrinology  He states compliance with all meds  He manages them in pill packs from the pharmacy  He states he is able to remove discontinued meds as needed & new meds he gets delivered from a local pharmacy  Those he manages in his old weekly pill boxes until he gets new pill packs or for the duration of therapy  He states his fasting BS was 269 this morning  He is not on prednisone, but states he had buttered noodles for dinner  He has been limited by the food he gets from the food bank & requests assistance with getting food this week  3/3/20 is the next time he will get money & food stamps  He also anticipates having a little more money now that he moved  Spoke to [facility]  [facility]  They will set out a couple of bags of food for this OP CM to  & deliver to the patient  Patient is aware

## 2020-02-25 ENCOUNTER — PATIENT OUTREACH (OUTPATIENT)
Dept: CASE MANAGEMENT | Facility: OTHER | Age: 60
End: 2020-02-25

## 2020-02-25 NOTE — PROGRESS NOTES
Picked up 3 bags of food from [facility]  [employer] in [place]  Met the patient in the lobby of his apartment complex to hand off the bags  He met me wearing his portable O2 & with a cart  No other needs at this time

## 2020-02-27 ENCOUNTER — OFFICE VISIT (OUTPATIENT)
Dept: CARDIOLOGY CLINIC | Facility: CLINIC | Age: 60
End: 2020-02-27
Payer: MEDICARE

## 2020-02-27 VITALS
SYSTOLIC BLOOD PRESSURE: 110 MMHG | WEIGHT: 262 LBS | HEIGHT: 70 IN | BODY MASS INDEX: 37.51 KG/M2 | OXYGEN SATURATION: 98 % | DIASTOLIC BLOOD PRESSURE: 78 MMHG | HEART RATE: 122 BPM

## 2020-02-27 DIAGNOSIS — J96.11 CHRONIC HYPOXEMIC RESPIRATORY FAILURE (HCC): ICD-10-CM

## 2020-02-27 DIAGNOSIS — J98.4 LUNG DISEASE, RESTRICTIVE: ICD-10-CM

## 2020-02-27 DIAGNOSIS — I48.91 ATRIAL FIBRILLATION WITH RVR (HCC): Primary | ICD-10-CM

## 2020-02-27 DIAGNOSIS — I48.91 ATRIAL FIBRILLATION WITH RVR (HCC): ICD-10-CM

## 2020-02-27 PROCEDURE — 99214 OFFICE O/P EST MOD 30 MIN: CPT | Performed by: INTERNAL MEDICINE

## 2020-02-27 PROCEDURE — 1036F TOBACCO NON-USER: CPT | Performed by: INTERNAL MEDICINE

## 2020-02-27 PROCEDURE — 3078F DIAST BP <80 MM HG: CPT | Performed by: INTERNAL MEDICINE

## 2020-02-27 PROCEDURE — 3008F BODY MASS INDEX DOCD: CPT | Performed by: INTERNAL MEDICINE

## 2020-02-27 PROCEDURE — 1111F DSCHRG MED/CURRENT MED MERGE: CPT | Performed by: INTERNAL MEDICINE

## 2020-02-27 PROCEDURE — 3074F SYST BP LT 130 MM HG: CPT | Performed by: INTERNAL MEDICINE
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
## 2020-02-28 ENCOUNTER — PATIENT OUTREACH (OUTPATIENT)
Dept: CASE MANAGEMENT | Facility: OTHER | Age: 60
End: 2020-02-28

## 2020-02-28 PROCEDURE — 93000 ELECTROCARDIOGRAM COMPLETE: CPT | Performed by: INTERNAL MEDICINE

## 2020-02-28 RX ORDER — APIXABAN 5 MG/1
5 TABLET, FILM COATED ORAL 2 TIMES DAILY
Qty: 180 TABLET | Refills: 3 | Status: SHIPPED | OUTPATIENT
Start: 2020-02-28 | End: 2020-08-04 | Stop reason: SDUPTHER

## 2020-03-03 ENCOUNTER — PATIENT OUTREACH (OUTPATIENT)
Dept: CASE MANAGEMENT | Facility: OTHER | Age: 60
End: 2020-03-03

## 2020-03-06 ENCOUNTER — PATIENT OUTREACH (OUTPATIENT)
Dept: CASE MANAGEMENT | Facility: OTHER | Age: 60
End: 2020-03-06

## 2020-03-11 ENCOUNTER — PATIENT OUTREACH (OUTPATIENT)
Dept: CASE MANAGEMENT | Facility: OTHER | Age: 60
End: 2020-03-11

## 2020-03-11 ENCOUNTER — TELEPHONE (OUTPATIENT)
Dept: ENDOCRINOLOGY | Facility: CLINIC | Age: 60
End: 2020-03-11

## 2020-03-11 NOTE — PROGRESS NOTES
Patient states he cancelled his pulmonology appointment because he wasn't feeling well  C/o congestion, SOB, & fatigue  He has a productive cough with clear mucus  He denies sore throat, fever, aches, chills, chest pain, n/v, diarrhea, or edema  He has not been weighing himself, but will start  He has his scale set up & ready  Discussed with patient that he is more at risk to develop a serious respiratory infection, have an exacerbation, or complication & he needs to contact & follow up with his doctors ASAP after developing new symptoms  He states he called pulmonology & left a message for them  He has been noncompliant with his diet & just ordered frozen dinners, frozen pasta, & other easy to cook premade meals  He states he is aware that he should follow a low salt diabetic diet, but he doesn't always have the energy & stamina to cook  Before we could discuss alternatives that would be quick & easy for him, he had to hang up to take a phone call from a provider

## 2020-03-11 NOTE — TELEPHONE ENCOUNTER
Patient called to make sure we received is Heather Fly report  Spoke with patient and reviewed his log and the changes to his medications    He understood    Medication list updated

## 2020-03-12 ENCOUNTER — PATIENT OUTREACH (OUTPATIENT)
Dept: CASE MANAGEMENT | Facility: OTHER | Age: 60
End: 2020-03-12

## 2020-03-12 ENCOUNTER — TELEPHONE (OUTPATIENT)
Dept: ENDOCRINOLOGY | Facility: CLINIC | Age: 60
End: 2020-03-12

## 2020-03-12 NOTE — TELEPHONE ENCOUNTER
Patient called to let us know that Ericka Andre has been trying to reach us regarding is order of Hall sensors  Called [facility] and they requested last office note in order to fill his prescription  Office note faxed and patient called

## 2020-03-13 ENCOUNTER — PATIENT OUTREACH (OUTPATIENT)
Dept: CASE MANAGEMENT | Facility: OTHER | Age: 60
End: 2020-03-13

## 2020-03-13 ENCOUNTER — TELEPHONE (OUTPATIENT)
Dept: ENDOCRINOLOGY | Facility: CLINIC | Age: 60
End: 2020-03-13

## 2020-03-13 NOTE — PROGRESS NOTES
Patient called back  His [facility]  is meeting with him this afternoon      Reviewed

## 2020-03-13 NOTE — PROGRESS NOTES
Per [facility], the patient was discharged from PT on 3/9/20  He is current for SN and OT services  Attempted to follow up with the patient again, but he was with OT  He will call back this afternoon

## 2020-03-13 NOTE — PROGRESS NOTES
Met with patient he is doing well, using oxygen 24/7, has cpap, nebulizer and all medications  Patient has food and is able to order food from [employer] for delivery  Patient and CHW s/w office on aging patient needs to contact medicaid to remove himself from workability program and advise them he is approved off mltss so  can finish process of approval of mltss   CHW and patient will contact mltss at next visit to complete process

## 2020-03-13 NOTE — TELEPHONE ENCOUNTER
Received fax from [facility] requesting [address] to be completed and fax along with last office note    faxed to [phone]

## 2020-03-16 LAB — FUNGUS SPEC CULT: NORMAL

## 2020-03-19 ENCOUNTER — PATIENT OUTREACH (OUTPATIENT)
Dept: CASE MANAGEMENT | Facility: OTHER | Age: 60
End: 2020-03-19

## 2020-03-19 NOTE — PROGRESS NOTES
Spoke with patient and he is doing well  Has food and medications  Using oxygen at all times  Has nebulizer and cpap  Patient will get a hha 2x per week for 2 hours each day starting next week  Patient will be contacting [facility] to remove himself from the workability program and advise them he is now enrolled in [employer] so everything can be changed for him to get the MLT[employer] benefits  Patient will call CHW back to advise how the conversation went   CHW will continue to assist

## 2020-03-20 ENCOUNTER — PATIENT OUTREACH (OUTPATIENT)
Dept: CASE MANAGEMENT | Facility: OTHER | Age: 60
End: 2020-03-20

## 2020-03-23 ENCOUNTER — PATIENT OUTREACH (OUTPATIENT)
Dept: CASE MANAGEMENT | Facility: OTHER | Age: 60
End: 2020-03-23

## 2020-03-26 ENCOUNTER — PATIENT OUTREACH (OUTPATIENT)
Dept: CASE MANAGEMENT | Facility: OTHER | Age: 60
End: 2020-03-26

## 2020-03-27 ENCOUNTER — TELEMEDICINE (OUTPATIENT)
Dept: ENDOCRINOLOGY | Facility: CLINIC | Age: 60
End: 2020-03-27
Payer: MEDICARE

## 2020-03-27 ENCOUNTER — TELEPHONE (OUTPATIENT)
Dept: ENDOCRINOLOGY | Facility: CLINIC | Age: 60
End: 2020-03-27

## 2020-03-27 ENCOUNTER — TELEPHONE (OUTPATIENT)
Dept: FAMILY MEDICINE CLINIC | Facility: CLINIC | Age: 60
End: 2020-03-27

## 2020-03-27 ENCOUNTER — PATIENT OUTREACH (OUTPATIENT)
Dept: CASE MANAGEMENT | Facility: OTHER | Age: 60
End: 2020-03-27

## 2020-03-27 DIAGNOSIS — Z79.4 TYPE 2 DIABETES MELLITUS WITH COMPLICATION, WITH LONG-TERM CURRENT USE OF INSULIN (HCC): Primary | ICD-10-CM

## 2020-03-27 DIAGNOSIS — E11.8 TYPE 2 DIABETES MELLITUS WITH COMPLICATION, WITH LONG-TERM CURRENT USE OF INSULIN (HCC): Primary | ICD-10-CM

## 2020-03-27 DIAGNOSIS — R53.83 FATIGUE, UNSPECIFIED TYPE: ICD-10-CM

## 2020-03-27 DIAGNOSIS — E11.65 UNCONTROLLED TYPE 2 DIABETES MELLITUS WITH HYPERGLYCEMIA (HCC): ICD-10-CM

## 2020-03-27 PROCEDURE — G2012 BRIEF CHECK IN BY MD/QHP: HCPCS | Performed by: INTERNAL MEDICINE

## 2020-03-27 NOTE — PROGRESS NOTES
Per [facility], the patient was discharged from OT on 3/20/20  He is still current for SN services

## 2020-03-27 NOTE — TELEPHONE ENCOUNTER
Called and spoke to patient and asked how he is feeling since the morning when her BG sugar was 600      He spoke to Dr Mich Mcdonald this morning and she provided recommendations for medication regimen and followed her suggestions and now BG is down to 220

## 2020-03-27 NOTE — TELEPHONE ENCOUNTER
Patient called and stated that he took BG twice the morning and the meter read high above 600  Wants to know what to do?
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
## 2020-03-30 ENCOUNTER — PATIENT OUTREACH (OUTPATIENT)
Dept: CASE MANAGEMENT | Facility: OTHER | Age: 60
End: 2020-03-30

## 2020-03-30 ENCOUNTER — TELEPHONE (OUTPATIENT)
Dept: FAMILY MEDICINE CLINIC | Facility: CLINIC | Age: 60
End: 2020-03-30

## 2020-03-30 ENCOUNTER — TELEPHONE (OUTPATIENT)
Dept: ENDOCRINOLOGY | Facility: CLINIC | Age: 60
End: 2020-03-30

## 2020-03-30 NOTE — TELEPHONE ENCOUNTER
Spoke to pt, advised Lindsay Berumen report reviewed  Increase Lantus to 64 units ant bedtime and continue current doses of mealtime insulin  Scheduled phone appt for 4/3 @ 11:40 am   Patient understood

## 2020-03-30 NOTE — TELEPHONE ENCOUNTER
Reviewed, please advised patient to increase Lantus to 64 units at bedtime  Continue current doses of mealtime insulin  Send log for review prior to follow-up which is to be scheduled for Friday

## 2020-03-30 NOTE — PROGRESS NOTES
Patient returned call  He states he was on the phone with pulmonary & made arrangements for a video visit for tomorrow morning  He is also waiting to hear back from his [facility] RN to coordinate a video visit with his PCP while they are present  Patient states over the weekend he was sneezing frequently  This morning he woke up feeling "congested"  He c/o right sided chest tightness & discomfort  His SOB is at his baseline  He reports a productive cough with "smokey white" mucus  C/o body aches & headache for which he's been taking acetaminophen  He is unable to take his temp, but states he's felt "sweaty" today  He reports using his nebulizer TID with result  He does not get paid until Friday, but states he would like to purchase mucinex   Encouraged fluids & rest

## 2020-03-31 ENCOUNTER — TELEMEDICINE (OUTPATIENT)
Dept: PULMONOLOGY | Facility: MEDICAL CENTER | Age: 60
End: 2020-03-31

## 2020-03-31 DIAGNOSIS — J44.1 COPD EXACERBATION (HCC): ICD-10-CM

## 2020-03-31 DIAGNOSIS — J20.9 ACUTE BRONCHITIS, UNSPECIFIED ORGANISM: ICD-10-CM

## 2020-03-31 DIAGNOSIS — J96.11 CHRONIC HYPOXEMIC RESPIRATORY FAILURE (HCC): Primary | ICD-10-CM

## 2020-03-31 PROBLEM — J20.8 ACUTE BRONCHITIS DUE TO OTHER SPECIFIED ORGANISMS: Status: ACTIVE | Noted: 2018-04-04

## 2020-03-31 LAB
MYCOBACTERIUM SPEC CULT: NORMAL
RHODAMINE-AURAMINE STN SPEC: NORMAL

## 2020-03-31 PROCEDURE — 99214 OFFICE O/P EST MOD 30 MIN: CPT | Performed by: INTERNAL MEDICINE

## 2020-03-31 RX ORDER — CEFUROXIME AXETIL 500 MG/1
500 TABLET ORAL EVERY 12 HOURS SCHEDULED
Qty: 14 TABLET | Refills: 0 | Status: SHIPPED | OUTPATIENT
Start: 2020-03-31 | End: 2020-04-07

## 2020-03-31 RX ORDER — PREDNISONE 20 MG/1
TABLET ORAL
Qty: 12 TABLET | Refills: 0 | Status: SHIPPED | OUTPATIENT
Start: 2020-03-31 | End: 2020-04-15
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
## 2020-04-02 ENCOUNTER — TELEPHONE (OUTPATIENT)
Dept: FAMILY MEDICINE CLINIC | Facility: CLINIC | Age: 60
End: 2020-04-02

## 2020-04-02 ENCOUNTER — TELEPHONE (OUTPATIENT)
Dept: ENDOCRINOLOGY | Facility: CLINIC | Age: 60
End: 2020-04-02

## 2020-04-03 ENCOUNTER — PATIENT OUTREACH (OUTPATIENT)
Dept: CASE MANAGEMENT | Facility: OTHER | Age: 60
End: 2020-04-03

## 2020-04-03 ENCOUNTER — TELEMEDICINE (OUTPATIENT)
Dept: ENDOCRINOLOGY | Facility: CLINIC | Age: 60
End: 2020-04-03
Payer: MEDICARE

## 2020-04-03 DIAGNOSIS — J96.11 CHRONIC HYPOXEMIC RESPIRATORY FAILURE (HCC): ICD-10-CM

## 2020-04-03 DIAGNOSIS — J44.1 COPD EXACERBATION (HCC): ICD-10-CM

## 2020-04-03 DIAGNOSIS — E11.8 TYPE 2 DIABETES MELLITUS WITH COMPLICATION, WITH LONG-TERM CURRENT USE OF INSULIN (HCC): Primary | ICD-10-CM

## 2020-04-03 DIAGNOSIS — E11.42 DIABETIC POLYNEUROPATHY ASSOCIATED WITH TYPE 2 DIABETES MELLITUS (HCC): ICD-10-CM

## 2020-04-03 DIAGNOSIS — J20.9 ACUTE BRONCHITIS, UNSPECIFIED ORGANISM: ICD-10-CM

## 2020-04-03 DIAGNOSIS — E11.8 TYPE 2 DIABETES MELLITUS WITH COMPLICATION, WITH LONG-TERM CURRENT USE OF INSULIN (HCC): ICD-10-CM

## 2020-04-03 DIAGNOSIS — E11.65 UNCONTROLLED TYPE 2 DIABETES MELLITUS WITH HYPERGLYCEMIA (HCC): ICD-10-CM

## 2020-04-03 DIAGNOSIS — Z79.4 TYPE 2 DIABETES MELLITUS WITH COMPLICATION, WITH LONG-TERM CURRENT USE OF INSULIN (HCC): ICD-10-CM

## 2020-04-03 DIAGNOSIS — Z79.4 TYPE 2 DIABETES MELLITUS WITH COMPLICATION, WITH LONG-TERM CURRENT USE OF INSULIN (HCC): Primary | ICD-10-CM

## 2020-04-03 PROCEDURE — 99213 OFFICE O/P EST LOW 20 MIN: CPT | Performed by: INTERNAL MEDICINE

## 2020-04-03 RX ORDER — INSULIN GLARGINE 100 [IU]/ML
INJECTION, SOLUTION SUBCUTANEOUS
Qty: 15 ML | Refills: 2 | Status: SHIPPED | OUTPATIENT
Start: 2020-04-03 | End: 2020-07-08

## 2020-04-07 ENCOUNTER — TELEMEDICINE (OUTPATIENT)
Dept: FAMILY MEDICINE CLINIC | Facility: CLINIC | Age: 60
End: 2020-04-07
Payer: MEDICARE

## 2020-04-07 DIAGNOSIS — J44.9 OSA AND COPD OVERLAP SYNDROME (HCC): ICD-10-CM

## 2020-04-07 DIAGNOSIS — E66.9 OBESITY (BMI 30-39.9): ICD-10-CM

## 2020-04-07 DIAGNOSIS — I48.20 CHRONIC A-FIB (HCC): ICD-10-CM

## 2020-04-07 DIAGNOSIS — G47.33 OSA AND COPD OVERLAP SYNDROME (HCC): ICD-10-CM

## 2020-04-07 DIAGNOSIS — E11.65 UNCONTROLLED TYPE 2 DIABETES MELLITUS WITH HYPERGLYCEMIA (HCC): Primary | Chronic | ICD-10-CM

## 2020-04-07 PROCEDURE — 99213 OFFICE O/P EST LOW 20 MIN: CPT | Performed by: FAMILY MEDICINE

## 2020-04-10 ENCOUNTER — TELEPHONE (OUTPATIENT)
Dept: ENDOCRINOLOGY | Facility: CLINIC | Age: 60
End: 2020-04-10

## 2020-04-11 VITALS
DIASTOLIC BLOOD PRESSURE: 80 MMHG | SYSTOLIC BLOOD PRESSURE: 140 MMHG | TEMPERATURE: 98.3 F | HEART RATE: 96 BPM | OXYGEN SATURATION: 96 %

## 2020-04-11 PROBLEM — IMO0001 TRANSITION OF CARE PERFORMED WITH SHARING OF CLINICAL SUMMARY: Status: ACTIVE | Noted: 2020-04-11

## 2020-04-11 PROBLEM — J18.9 PNEUMONIA OF RIGHT LOWER LOBE DUE TO INFECTIOUS ORGANISM: Status: ACTIVE | Noted: 2020-04-11

## 2020-04-11 PROBLEM — Z78.9 TRANSITION OF CARE PERFORMED WITH SHARING OF CLINICAL SUMMARY: Status: ACTIVE | Noted: 2020-04-11

## 2020-04-13 ENCOUNTER — PATIENT OUTREACH (OUTPATIENT)
Dept: CASE MANAGEMENT | Facility: OTHER | Age: 60
End: 2020-04-13

## 2020-04-13 DIAGNOSIS — E11.65 UNCONTROLLED TYPE 2 DIABETES MELLITUS WITH HYPERGLYCEMIA (HCC): Primary | ICD-10-CM

## 2020-04-15 ENCOUNTER — TELEPHONE (OUTPATIENT)
Dept: OTHER | Facility: HOSPITAL | Age: 60
End: 2020-04-15

## 2020-04-15 ENCOUNTER — APPOINTMENT (EMERGENCY)
Dept: RADIOLOGY | Facility: HOSPITAL | Age: 60
End: 2020-04-15
Payer: MEDICARE

## 2020-04-15 ENCOUNTER — TELEPHONE (OUTPATIENT)
Dept: FAMILY MEDICINE CLINIC | Facility: CLINIC | Age: 60
End: 2020-04-15

## 2020-04-15 ENCOUNTER — PATIENT OUTREACH (OUTPATIENT)
Dept: CASE MANAGEMENT | Facility: OTHER | Age: 60
End: 2020-04-15

## 2020-04-15 ENCOUNTER — TELEPHONE (OUTPATIENT)
Dept: OTHER | Facility: OTHER | Age: 60
End: 2020-04-15

## 2020-04-15 ENCOUNTER — HOSPITAL ENCOUNTER (EMERGENCY)
Facility: HOSPITAL | Age: 60
Discharge: HOME | End: 2020-04-15
Attending: EMERGENCY MEDICINE | Admitting: EMERGENCY MEDICINE
Payer: MEDICARE

## 2020-04-15 ENCOUNTER — TELEPHONE (OUTPATIENT)
Dept: PULMONOLOGY | Facility: MEDICAL CENTER | Age: 60
End: 2020-04-15

## 2020-04-15 VITALS
OXYGEN SATURATION: 96 % | WEIGHT: 270 LBS | HEIGHT: 70 IN | HEART RATE: 78 BPM | SYSTOLIC BLOOD PRESSURE: 134 MMHG | DIASTOLIC BLOOD PRESSURE: 68 MMHG | TEMPERATURE: 99 F | BODY MASS INDEX: 38.65 KG/M2 | RESPIRATION RATE: 22 BRPM

## 2020-04-15 DIAGNOSIS — J18.9 PNEUMONIA: Primary | ICD-10-CM

## 2020-04-15 LAB
ALBUMIN SERPL BCP-MCNC: 3.4 G/DL (ref 3.5–5)
ALP SERPL-CCNC: 60 U/L (ref 46–116)
ALT SERPL W P-5'-P-CCNC: 52 U/L (ref 12–78)
ANION GAP SERPL CALCULATED.3IONS-SCNC: 12 MMOL/L (ref 4–13)
APTT PPP: 27 SECONDS (ref 23–37)
AST SERPL W P-5'-P-CCNC: 66 U/L (ref 5–45)
BASOPHILS # BLD AUTO: 0.04 THOUSANDS/ΜL (ref 0–0.1)
BASOPHILS NFR BLD AUTO: 0 % (ref 0–1)
BILIRUB SERPL-MCNC: 0.7 MG/DL (ref 0.2–1)
BUN SERPL-MCNC: 15 MG/DL (ref 5–25)
CALCIUM SERPL-MCNC: 8.4 MG/DL (ref 8.3–10.1)
CHLORIDE SERPL-SCNC: 97 MMOL/L (ref 100–108)
CO2 SERPL-SCNC: 25 MMOL/L (ref 21–32)
CREAT SERPL-MCNC: 1.04 MG/DL (ref 0.6–1.3)
CRP SERPL HS-MCNC: <0.9 MG/L
D DIMER PPP FEU-MCNC: <0.27 UG/ML FEU
EOSINOPHIL # BLD AUTO: 0.17 THOUSAND/ΜL (ref 0–0.61)
EOSINOPHIL NFR BLD AUTO: 1 % (ref 0–6)
ERYTHROCYTE [DISTWIDTH] IN BLOOD BY AUTOMATED COUNT: 12.9 % (ref 11.6–15.1)
ERYTHROCYTE [SEDIMENTATION RATE] IN BLOOD: 5 MM/HOUR (ref 2–10)
FERRITIN SERPL-MCNC: 181 NG/ML (ref 8–388)
FIBRINOGEN PPP-MCNC: 291 MG/DL (ref 227–495)
GFR SERPL CREATININE-BSD FRML MDRD: 78 ML/MIN/1.73SQ M
GLUCOSE SERPL-MCNC: 246 MG/DL (ref 65–140)
HCT VFR BLD AUTO: 42.4 % (ref 36.5–49.3)
HGB BLD-MCNC: 14.5 G/DL (ref 12–17)
IMM GRANULOCYTES # BLD AUTO: 0.13 THOUSAND/UL (ref 0–0.2)
IMM GRANULOCYTES NFR BLD AUTO: 1 % (ref 0–2)
INR PPP: 1.15 (ref 0.84–1.19)
LDH SERPL-CCNC: 435 U/L (ref 81–234)
LYMPHOCYTES # BLD AUTO: 5.08 THOUSANDS/ΜL (ref 0.6–4.47)
LYMPHOCYTES NFR BLD AUTO: 42 % (ref 14–44)
MCH RBC QN AUTO: 32.2 PG (ref 26.8–34.3)
MCHC RBC AUTO-ENTMCNC: 34.2 G/DL (ref 31.4–37.4)
MCV RBC AUTO: 94 FL (ref 82–98)
MONOCYTES # BLD AUTO: 0.64 THOUSAND/ΜL (ref 0.17–1.22)
MONOCYTES NFR BLD AUTO: 5 % (ref 4–12)
NEUTROPHILS # BLD AUTO: 6.08 THOUSANDS/ΜL (ref 1.85–7.62)
NEUTS SEG NFR BLD AUTO: 51 % (ref 43–75)
NRBC BLD AUTO-RTO: 0 /100 WBCS
PLATELET # BLD AUTO: 169 THOUSANDS/UL (ref 149–390)
PMV BLD AUTO: 10.9 FL (ref 8.9–12.7)
POTASSIUM SERPL-SCNC: 5 MMOL/L (ref 3.5–5.3)
PROCALCITONIN SERPL-MCNC: <0.05 NG/ML
PROT SERPL-MCNC: 6.5 G/DL (ref 6.4–8.2)
PROTHROMBIN TIME: 15.1 SECONDS (ref 11.6–14.5)
RBC # BLD AUTO: 4.51 MILLION/UL (ref 3.88–5.62)
SODIUM SERPL-SCNC: 134 MMOL/L (ref 136–145)
WBC # BLD AUTO: 12.14 THOUSAND/UL (ref 4.31–10.16)

## 2020-04-15 PROCEDURE — 85379 FIBRIN DEGRADATION QUANT: CPT | Performed by: EMERGENCY MEDICINE

## 2020-04-15 PROCEDURE — 85025 COMPLETE CBC W/AUTO DIFF WBC: CPT | Performed by: EMERGENCY MEDICINE

## 2020-04-15 PROCEDURE — 85384 FIBRINOGEN ACTIVITY: CPT | Performed by: EMERGENCY MEDICINE

## 2020-04-15 PROCEDURE — 99285 EMERGENCY DEPT VISIT HI MDM: CPT

## 2020-04-15 PROCEDURE — 36415 COLL VENOUS BLD VENIPUNCTURE: CPT | Performed by: EMERGENCY MEDICINE

## 2020-04-15 PROCEDURE — 85610 PROTHROMBIN TIME: CPT | Performed by: EMERGENCY MEDICINE

## 2020-04-15 PROCEDURE — 83615 LACTATE (LD) (LDH) ENZYME: CPT | Performed by: EMERGENCY MEDICINE

## 2020-04-15 PROCEDURE — 84145 PROCALCITONIN (PCT): CPT | Performed by: EMERGENCY MEDICINE

## 2020-04-15 PROCEDURE — 87635 SARS-COV-2 COVID-19 AMP PRB: CPT | Performed by: EMERGENCY MEDICINE

## 2020-04-15 PROCEDURE — 85730 THROMBOPLASTIN TIME PARTIAL: CPT | Performed by: EMERGENCY MEDICINE

## 2020-04-15 PROCEDURE — 99284 EMERGENCY DEPT VISIT MOD MDM: CPT | Performed by: EMERGENCY MEDICINE

## 2020-04-15 PROCEDURE — 82728 ASSAY OF FERRITIN: CPT | Performed by: EMERGENCY MEDICINE

## 2020-04-15 PROCEDURE — 80053 COMPREHEN METABOLIC PANEL: CPT | Performed by: EMERGENCY MEDICINE

## 2020-04-15 PROCEDURE — 85652 RBC SED RATE AUTOMATED: CPT | Performed by: EMERGENCY MEDICINE

## 2020-04-15 PROCEDURE — 96365 THER/PROPH/DIAG IV INF INIT: CPT

## 2020-04-15 PROCEDURE — 86141 C-REACTIVE PROTEIN HS: CPT | Performed by: EMERGENCY MEDICINE

## 2020-04-15 PROCEDURE — 71045 X-RAY EXAM CHEST 1 VIEW: CPT

## 2020-04-15 RX ORDER — AZITHROMYCIN 250 MG/1
TABLET, FILM COATED ORAL
Qty: 6 TABLET | Refills: 0 | Status: SHIPPED | OUTPATIENT
Start: 2020-04-15 | End: 2020-04-19

## 2020-04-15 RX ORDER — AZITHROMYCIN 250 MG/1
TABLET, FILM COATED ORAL
Qty: 4 TABLET | Refills: 0 | Status: SHIPPED | OUTPATIENT
Start: 2020-04-15 | End: 2020-04-19

## 2020-04-15 RX ORDER — CEFTRIAXONE 1 G/50ML
1000 INJECTION, SOLUTION INTRAVENOUS ONCE
Status: COMPLETED | OUTPATIENT
Start: 2020-04-15 | End: 2020-04-15

## 2020-04-15 RX ORDER — AZITHROMYCIN 250 MG/1
500 TABLET, FILM COATED ORAL ONCE
Status: COMPLETED | OUTPATIENT
Start: 2020-04-15 | End: 2020-04-15

## 2020-04-15 RX ADMIN — AZITHROMYCIN 500 MG: 250 TABLET, FILM COATED ORAL at 14:44

## 2020-04-15 RX ADMIN — CEFTRIAXONE 1000 MG: 1 INJECTION, SOLUTION INTRAVENOUS at 14:43

## 2020-04-17 ENCOUNTER — TELEPHONE (OUTPATIENT)
Dept: ENDOCRINOLOGY | Facility: CLINIC | Age: 60
End: 2020-04-17

## 2020-04-17 ENCOUNTER — TELEMEDICINE (OUTPATIENT)
Dept: ENDOCRINOLOGY | Facility: CLINIC | Age: 60
End: 2020-04-17
Payer: MEDICARE

## 2020-04-17 DIAGNOSIS — E11.65 UNCONTROLLED TYPE 2 DIABETES MELLITUS WITH HYPERGLYCEMIA (HCC): Primary | ICD-10-CM

## 2020-04-17 DIAGNOSIS — Z79.4 TYPE 2 DIABETES MELLITUS WITH COMPLICATION, WITH LONG-TERM CURRENT USE OF INSULIN (HCC): ICD-10-CM

## 2020-04-17 DIAGNOSIS — E11.8 TYPE 2 DIABETES MELLITUS WITH COMPLICATION, WITH LONG-TERM CURRENT USE OF INSULIN (HCC): ICD-10-CM

## 2020-04-17 DIAGNOSIS — J96.11 CHRONIC HYPOXEMIC RESPIRATORY FAILURE (HCC): ICD-10-CM

## 2020-04-17 LAB — SARS-COV-2 RNA SPEC QL NAA+PROBE: NOT DETECTED

## 2020-04-17 PROCEDURE — 99442 PR PHYS/QHP TELEPHONE EVALUATION 11-20 MIN: CPT | Performed by: INTERNAL MEDICINE

## 2020-04-20 ENCOUNTER — TELEPHONE (OUTPATIENT)
Dept: FAMILY MEDICINE CLINIC | Facility: CLINIC | Age: 60
End: 2020-04-20

## 2020-04-22 ENCOUNTER — TELEPHONE (OUTPATIENT)
Dept: FAMILY MEDICINE CLINIC | Facility: CLINIC | Age: 60
End: 2020-04-22

## 2020-04-22 DIAGNOSIS — E11.65 UNCONTROLLED TYPE 2 DIABETES MELLITUS WITH HYPERGLYCEMIA (HCC): ICD-10-CM

## 2020-04-22 DIAGNOSIS — D72.829 LEUKOCYTOSIS, UNSPECIFIED TYPE: Primary | ICD-10-CM

## 2020-04-27 ENCOUNTER — PATIENT OUTREACH (OUTPATIENT)
Dept: CASE MANAGEMENT | Facility: OTHER | Age: 60
End: 2020-04-27

## 2020-04-28 ENCOUNTER — TELEMEDICINE (OUTPATIENT)
Dept: FAMILY MEDICINE CLINIC | Facility: CLINIC | Age: 60
End: 2020-04-28
Payer: MEDICARE

## 2020-04-28 ENCOUNTER — APPOINTMENT (OUTPATIENT)
Dept: LAB | Facility: CLINIC | Age: 60
End: 2020-04-28
Payer: MEDICARE

## 2020-04-28 ENCOUNTER — TRANSCRIBE ORDERS (OUTPATIENT)
Dept: LAB | Facility: CLINIC | Age: 60
End: 2020-04-28

## 2020-04-28 VITALS
WEIGHT: 280 LBS | TEMPERATURE: 99.1 F | OXYGEN SATURATION: 93 % | HEART RATE: 88 BPM | BODY MASS INDEX: 40.18 KG/M2 | SYSTOLIC BLOOD PRESSURE: 178 MMHG | RESPIRATION RATE: 20 BRPM | DIASTOLIC BLOOD PRESSURE: 90 MMHG

## 2020-04-28 DIAGNOSIS — I48.0 PAF (PAROXYSMAL ATRIAL FIBRILLATION) (HCC): ICD-10-CM

## 2020-04-28 DIAGNOSIS — E11.65 UNCONTROLLED TYPE 2 DIABETES MELLITUS WITH HYPERGLYCEMIA (HCC): ICD-10-CM

## 2020-04-28 DIAGNOSIS — I10 BENIGN ESSENTIAL HYPERTENSION: ICD-10-CM

## 2020-04-28 DIAGNOSIS — E11.65 UNCONTROLLED TYPE 2 DIABETES MELLITUS WITH HYPERGLYCEMIA (HCC): Primary | ICD-10-CM

## 2020-04-28 DIAGNOSIS — J44.1 COPD WITH EXACERBATION (HCC): ICD-10-CM

## 2020-04-28 DIAGNOSIS — D72.829 LEUKOCYTOSIS, UNSPECIFIED TYPE: ICD-10-CM

## 2020-04-28 LAB
ANION GAP SERPL CALCULATED.3IONS-SCNC: 7 MMOL/L (ref 4–13)
BASOPHILS # BLD AUTO: 0.04 THOUSANDS/ΜL (ref 0–0.1)
BASOPHILS NFR BLD AUTO: 0 % (ref 0–1)
BUN SERPL-MCNC: 12 MG/DL (ref 5–25)
CALCIUM SERPL-MCNC: 8.9 MG/DL (ref 8.3–10.1)
CHLORIDE SERPL-SCNC: 101 MMOL/L (ref 100–108)
CO2 SERPL-SCNC: 25 MMOL/L (ref 21–32)
CREAT SERPL-MCNC: 0.85 MG/DL (ref 0.6–1.3)
DIGOXIN SERPL-MCNC: 0.8 NG/ML (ref 0.8–2)
EOSINOPHIL # BLD AUTO: 0.16 THOUSAND/ΜL (ref 0–0.61)
EOSINOPHIL NFR BLD AUTO: 2 % (ref 0–6)
ERYTHROCYTE [DISTWIDTH] IN BLOOD BY AUTOMATED COUNT: 13.4 % (ref 11.6–15.1)
EST. AVERAGE GLUCOSE BLD GHB EST-MCNC: 203 MG/DL
GFR SERPL CREATININE-BSD FRML MDRD: 95 ML/MIN/1.73SQ M
GLUCOSE SERPL-MCNC: 110 MG/DL (ref 65–140)
HBA1C MFR BLD: 8.7 %
HCT VFR BLD AUTO: 43.8 % (ref 36.5–49.3)
HGB BLD-MCNC: 15.3 G/DL (ref 12–17)
IMM GRANULOCYTES # BLD AUTO: 0.06 THOUSAND/UL (ref 0–0.2)
IMM GRANULOCYTES NFR BLD AUTO: 1 % (ref 0–2)
LYMPHOCYTES # BLD AUTO: 5.6 THOUSANDS/ΜL (ref 0.6–4.47)
LYMPHOCYTES NFR BLD AUTO: 52 % (ref 14–44)
MCH RBC QN AUTO: 32.6 PG (ref 26.8–34.3)
MCHC RBC AUTO-ENTMCNC: 34.9 G/DL (ref 31.4–37.4)
MCV RBC AUTO: 93 FL (ref 82–98)
MONOCYTES # BLD AUTO: 0.74 THOUSAND/ΜL (ref 0.17–1.22)
MONOCYTES NFR BLD AUTO: 7 % (ref 4–12)
NEUTROPHILS # BLD AUTO: 3.96 THOUSANDS/ΜL (ref 1.85–7.62)
NEUTS SEG NFR BLD AUTO: 38 % (ref 43–75)
NRBC BLD AUTO-RTO: 0 /100 WBCS
PLATELET # BLD AUTO: 155 THOUSANDS/UL (ref 149–390)
PMV BLD AUTO: 10.7 FL (ref 8.9–12.7)
POTASSIUM SERPL-SCNC: 4.9 MMOL/L (ref 3.5–5.3)
RBC # BLD AUTO: 4.7 MILLION/UL (ref 3.88–5.62)
SODIUM SERPL-SCNC: 133 MMOL/L (ref 136–145)
WBC # BLD AUTO: 10.56 THOUSAND/UL (ref 4.31–10.16)

## 2020-04-28 PROCEDURE — 80048 BASIC METABOLIC PNL TOTAL CA: CPT

## 2020-04-28 PROCEDURE — 83036 HEMOGLOBIN GLYCOSYLATED A1C: CPT

## 2020-04-28 PROCEDURE — 36415 COLL VENOUS BLD VENIPUNCTURE: CPT

## 2020-04-28 PROCEDURE — 99213 OFFICE O/P EST LOW 20 MIN: CPT | Performed by: FAMILY MEDICINE

## 2020-04-28 PROCEDURE — 80162 ASSAY OF DIGOXIN TOTAL: CPT

## 2020-04-28 PROCEDURE — 85025 COMPLETE CBC W/AUTO DIFF WBC: CPT

## 2020-04-28 RX ORDER — BUSPIRONE HYDROCHLORIDE 10 MG/1
10 TABLET ORAL 2 TIMES DAILY
COMMUNITY
Start: 2020-04-25

## 2020-04-30 ENCOUNTER — TELEPHONE (OUTPATIENT)
Dept: ENDOCRINOLOGY | Facility: CLINIC | Age: 60
End: 2020-04-30

## 2020-05-01 ENCOUNTER — TELEMEDICINE (OUTPATIENT)
Dept: ENDOCRINOLOGY | Facility: CLINIC | Age: 60
End: 2020-05-01
Payer: MEDICARE

## 2020-05-01 DIAGNOSIS — R53.83 FATIGUE, UNSPECIFIED TYPE: ICD-10-CM

## 2020-05-01 DIAGNOSIS — R73.9 HYPERGLYCEMIA: ICD-10-CM

## 2020-05-01 DIAGNOSIS — E11.8 TYPE 2 DIABETES MELLITUS WITH COMPLICATION, WITH LONG-TERM CURRENT USE OF INSULIN (HCC): Primary | ICD-10-CM

## 2020-05-01 DIAGNOSIS — J96.11 CHRONIC HYPOXEMIC RESPIRATORY FAILURE (HCC): ICD-10-CM

## 2020-05-01 DIAGNOSIS — E11.42 DIABETIC POLYNEUROPATHY ASSOCIATED WITH TYPE 2 DIABETES MELLITUS (HCC): ICD-10-CM

## 2020-05-01 DIAGNOSIS — Z79.4 TYPE 2 DIABETES MELLITUS WITH COMPLICATION, WITH LONG-TERM CURRENT USE OF INSULIN (HCC): Primary | ICD-10-CM

## 2020-05-01 PROCEDURE — 99213 OFFICE O/P EST LOW 20 MIN: CPT | Performed by: INTERNAL MEDICINE

## 2020-05-04 ENCOUNTER — TELEMEDICINE (OUTPATIENT)
Dept: FAMILY MEDICINE CLINIC | Facility: CLINIC | Age: 60
End: 2020-05-04
Payer: MEDICARE

## 2020-05-04 ENCOUNTER — TELEPHONE (OUTPATIENT)
Dept: CARDIOLOGY CLINIC | Facility: CLINIC | Age: 60
End: 2020-05-04

## 2020-05-04 VITALS
SYSTOLIC BLOOD PRESSURE: 150 MMHG | DIASTOLIC BLOOD PRESSURE: 100 MMHG | OXYGEN SATURATION: 97 % | RESPIRATION RATE: 20 BRPM | HEART RATE: 82 BPM | BODY MASS INDEX: 40.18 KG/M2 | WEIGHT: 280 LBS | TEMPERATURE: 97.9 F

## 2020-05-04 DIAGNOSIS — I50.22 CHRONIC SYSTOLIC HEART FAILURE (HCC): ICD-10-CM

## 2020-05-04 DIAGNOSIS — E11.65 UNCONTROLLED TYPE 2 DIABETES MELLITUS WITH HYPERGLYCEMIA (HCC): Primary | Chronic | ICD-10-CM

## 2020-05-04 DIAGNOSIS — I48.0 PAF (PAROXYSMAL ATRIAL FIBRILLATION) (HCC): ICD-10-CM

## 2020-05-04 DIAGNOSIS — I10 BENIGN ESSENTIAL HYPERTENSION: ICD-10-CM

## 2020-05-04 DIAGNOSIS — F31.9 BIPOLAR AFFECTIVE DISORDER, REMISSION STATUS UNSPECIFIED (HCC): ICD-10-CM

## 2020-05-04 PROCEDURE — 99214 OFFICE O/P EST MOD 30 MIN: CPT | Performed by: FAMILY MEDICINE

## 2020-05-05 ENCOUNTER — TELEMEDICINE (OUTPATIENT)
Dept: CARDIOLOGY CLINIC | Facility: CLINIC | Age: 60
End: 2020-05-05
Payer: MEDICARE

## 2020-05-05 VITALS — OXYGEN SATURATION: 94 %

## 2020-05-05 DIAGNOSIS — I50.22 CHRONIC SYSTOLIC HEART FAILURE (HCC): Primary | ICD-10-CM

## 2020-05-05 PROCEDURE — 99214 OFFICE O/P EST MOD 30 MIN: CPT | Performed by: INTERNAL MEDICINE

## 2020-05-05 RX ORDER — TORSEMIDE 10 MG/1
10 TABLET ORAL DAILY
Qty: 30 TABLET | Refills: 0 | Status: SHIPPED | OUTPATIENT
Start: 2020-05-05 | End: 2020-05-19

## 2020-05-11 ENCOUNTER — TELEPHONE (OUTPATIENT)
Dept: FAMILY MEDICINE CLINIC | Facility: CLINIC | Age: 60
End: 2020-05-11

## 2020-05-11 ENCOUNTER — PATIENT OUTREACH (OUTPATIENT)
Dept: CASE MANAGEMENT | Facility: OTHER | Age: 60
End: 2020-05-11

## 2020-05-13 ENCOUNTER — TELEMEDICINE (OUTPATIENT)
Dept: ENDOCRINOLOGY | Facility: CLINIC | Age: 60
End: 2020-05-13
Payer: MEDICARE

## 2020-05-13 ENCOUNTER — TELEPHONE (OUTPATIENT)
Dept: ENDOCRINOLOGY | Facility: CLINIC | Age: 60
End: 2020-05-13

## 2020-05-13 ENCOUNTER — TELEPHONE (OUTPATIENT)
Dept: FAMILY MEDICINE CLINIC | Facility: CLINIC | Age: 60
End: 2020-05-13

## 2020-05-13 ENCOUNTER — PATIENT OUTREACH (OUTPATIENT)
Dept: CASE MANAGEMENT | Facility: OTHER | Age: 60
End: 2020-05-13

## 2020-05-13 DIAGNOSIS — I25.10 CORONARY ARTERY DISEASE INVOLVING NATIVE HEART, ANGINA PRESENCE UNSPECIFIED, UNSPECIFIED VESSEL OR LESION TYPE: ICD-10-CM

## 2020-05-13 DIAGNOSIS — J96.11 CHRONIC HYPOXEMIC RESPIRATORY FAILURE (HCC): ICD-10-CM

## 2020-05-13 DIAGNOSIS — J44.9 OSA AND COPD OVERLAP SYNDROME (HCC): ICD-10-CM

## 2020-05-13 DIAGNOSIS — B36.9 FUNGAL DERMATITIS: Primary | ICD-10-CM

## 2020-05-13 DIAGNOSIS — E11.42 DIABETIC POLYNEUROPATHY ASSOCIATED WITH TYPE 2 DIABETES MELLITUS (HCC): ICD-10-CM

## 2020-05-13 DIAGNOSIS — Z79.4 TYPE 2 DIABETES MELLITUS WITH COMPLICATION, WITH LONG-TERM CURRENT USE OF INSULIN (HCC): Primary | ICD-10-CM

## 2020-05-13 DIAGNOSIS — E11.8 TYPE 2 DIABETES MELLITUS WITH COMPLICATION, WITH LONG-TERM CURRENT USE OF INSULIN (HCC): Primary | ICD-10-CM

## 2020-05-13 DIAGNOSIS — R73.9 HYPERGLYCEMIA: ICD-10-CM

## 2020-05-13 DIAGNOSIS — E78.5 DYSLIPIDEMIA: ICD-10-CM

## 2020-05-13 DIAGNOSIS — R53.83 FATIGUE, UNSPECIFIED TYPE: Primary | ICD-10-CM

## 2020-05-13 DIAGNOSIS — G47.33 OSA AND COPD OVERLAP SYNDROME (HCC): ICD-10-CM

## 2020-05-13 PROCEDURE — 99214 OFFICE O/P EST MOD 30 MIN: CPT | Performed by: INTERNAL MEDICINE

## 2020-05-13 RX ORDER — CLOTRIMAZOLE AND BETAMETHASONE DIPROPIONATE 10; .64 MG/G; MG/G
CREAM TOPICAL 2 TIMES DAILY
Qty: 45 G | Refills: 1 | Status: SHIPPED | OUTPATIENT
Start: 2020-05-13 | End: 2021-04-06

## 2020-05-14 RX ORDER — ICOSAPENT ETHYL 1000 MG/1
2 CAPSULE ORAL 2 TIMES DAILY
Qty: 270 CAPSULE | Refills: 3 | Status: SHIPPED | OUTPATIENT
Start: 2020-05-14 | End: 2021-01-29

## 2020-05-15 ENCOUNTER — TELEPHONE (OUTPATIENT)
Dept: FAMILY MEDICINE CLINIC | Facility: CLINIC | Age: 60
End: 2020-05-15

## 2020-05-18 DIAGNOSIS — I50.22 CHRONIC SYSTOLIC HEART FAILURE (HCC): ICD-10-CM

## 2020-05-19 RX ORDER — TORSEMIDE 10 MG/1
10 TABLET ORAL DAILY
Qty: 30 TABLET | Refills: 0 | Status: SHIPPED | OUTPATIENT
Start: 2020-05-19 | End: 2020-06-18 | Stop reason: SDUPTHER

## 2020-05-22 ENCOUNTER — TELEPHONE (OUTPATIENT)
Dept: CARDIOLOGY CLINIC | Facility: CLINIC | Age: 60
End: 2020-05-22

## 2020-05-22 ENCOUNTER — TELEMEDICINE (OUTPATIENT)
Dept: CARDIOLOGY CLINIC | Facility: CLINIC | Age: 60
End: 2020-05-22
Payer: MEDICARE

## 2020-05-22 VITALS
SYSTOLIC BLOOD PRESSURE: 150 MMHG | DIASTOLIC BLOOD PRESSURE: 90 MMHG | BODY MASS INDEX: 40.18 KG/M2 | HEART RATE: 82 BPM | WEIGHT: 280 LBS

## 2020-05-22 DIAGNOSIS — I50.22 CHRONIC SYSTOLIC HEART FAILURE (HCC): Primary | ICD-10-CM

## 2020-05-22 PROCEDURE — G2012 BRIEF CHECK IN BY MD/QHP: HCPCS | Performed by: INTERNAL MEDICINE

## 2020-05-22 RX ORDER — MULTIVIT WITH MINERALS/LUTEIN
1000 TABLET ORAL DAILY
COMMUNITY

## 2020-05-22 RX ORDER — SPIRONOLACTONE 25 MG/1
25 TABLET ORAL DAILY
Qty: 30 TABLET | Refills: 5 | Status: SHIPPED | OUTPATIENT
Start: 2020-05-22 | End: 2020-08-04 | Stop reason: SDUPTHER

## 2020-05-22 RX ORDER — QUETIAPINE FUMARATE 300 MG/1
300 TABLET, FILM COATED ORAL EVERY EVENING
COMMUNITY
Start: 2020-05-19 | End: 2020-10-11

## 2020-05-24 PROBLEM — I48.0 PAF (PAROXYSMAL ATRIAL FIBRILLATION) (HCC): Status: ACTIVE | Noted: 2020-05-24

## 2020-05-26 ENCOUNTER — PATIENT OUTREACH (OUTPATIENT)
Dept: CASE MANAGEMENT | Facility: OTHER | Age: 60
End: 2020-05-26

## 2020-05-28 ENCOUNTER — TELEPHONE (OUTPATIENT)
Dept: ENDOCRINOLOGY | Facility: CLINIC | Age: 60
End: 2020-05-28

## 2020-05-28 ENCOUNTER — TELEMEDICINE (OUTPATIENT)
Dept: ENDOCRINOLOGY | Facility: CLINIC | Age: 60
End: 2020-05-28
Payer: MEDICARE

## 2020-05-28 DIAGNOSIS — J44.1 COPD EXACERBATION (HCC): ICD-10-CM

## 2020-05-28 DIAGNOSIS — Z79.4 TYPE 2 DIABETES MELLITUS WITH COMPLICATION, WITH LONG-TERM CURRENT USE OF INSULIN (HCC): Primary | ICD-10-CM

## 2020-05-28 DIAGNOSIS — G47.33 OSA AND COPD OVERLAP SYNDROME (HCC): ICD-10-CM

## 2020-05-28 DIAGNOSIS — J44.9 OSA AND COPD OVERLAP SYNDROME (HCC): ICD-10-CM

## 2020-05-28 DIAGNOSIS — E11.42 DIABETIC POLYNEUROPATHY ASSOCIATED WITH TYPE 2 DIABETES MELLITUS (HCC): ICD-10-CM

## 2020-05-28 DIAGNOSIS — E11.8 TYPE 2 DIABETES MELLITUS WITH COMPLICATION, WITH LONG-TERM CURRENT USE OF INSULIN (HCC): Primary | ICD-10-CM

## 2020-05-28 DIAGNOSIS — I25.10 CORONARY ARTERY DISEASE INVOLVING NATIVE HEART, ANGINA PRESENCE UNSPECIFIED, UNSPECIFIED VESSEL OR LESION TYPE: ICD-10-CM

## 2020-05-28 DIAGNOSIS — R53.83 FATIGUE, UNSPECIFIED TYPE: ICD-10-CM

## 2020-05-28 PROCEDURE — 99214 OFFICE O/P EST MOD 30 MIN: CPT | Performed by: INTERNAL MEDICINE

## 2020-06-01 DIAGNOSIS — I25.10 CORONARY ARTERY DISEASE INVOLVING NATIVE HEART WITHOUT ANGINA PECTORIS, UNSPECIFIED VESSEL OR LESION TYPE: Primary | ICD-10-CM

## 2020-06-01 RX ORDER — METOPROLOL SUCCINATE 200 MG/1
200 TABLET, EXTENDED RELEASE ORAL DAILY
Qty: 90 TABLET | Refills: 3 | Status: SHIPPED | OUTPATIENT
Start: 2020-06-01 | End: 2021-04-26

## 2020-06-04 ENCOUNTER — PATIENT OUTREACH (OUTPATIENT)
Dept: CASE MANAGEMENT | Facility: OTHER | Age: 60
End: 2020-06-04

## 2020-06-05 DIAGNOSIS — E11.65 UNCONTROLLED TYPE 2 DIABETES MELLITUS WITH HYPERGLYCEMIA (HCC): ICD-10-CM

## 2020-06-05 RX ORDER — GABAPENTIN 100 MG/1
CAPSULE ORAL
Qty: 90 CAPSULE | Refills: 3 | Status: SHIPPED | OUTPATIENT
Start: 2020-06-05 | End: 2020-09-25

## 2020-06-12 ENCOUNTER — PATIENT OUTREACH (OUTPATIENT)
Dept: FAMILY MEDICINE CLINIC | Facility: CLINIC | Age: 60
End: 2020-06-12

## 2020-06-15 ENCOUNTER — TELEPHONE (OUTPATIENT)
Dept: ENDOCRINOLOGY | Facility: CLINIC | Age: 60
End: 2020-06-15

## 2020-06-15 DIAGNOSIS — Z79.4 TYPE 2 DIABETES MELLITUS WITH COMPLICATION, WITH LONG-TERM CURRENT USE OF INSULIN (HCC): ICD-10-CM

## 2020-06-15 DIAGNOSIS — E11.8 TYPE 2 DIABETES MELLITUS WITH COMPLICATION, WITH LONG-TERM CURRENT USE OF INSULIN (HCC): ICD-10-CM

## 2020-06-16 ENCOUNTER — TELEPHONE (OUTPATIENT)
Dept: FAMILY MEDICINE CLINIC | Facility: CLINIC | Age: 60
End: 2020-06-16

## 2020-06-17 ENCOUNTER — TELEPHONE (OUTPATIENT)
Dept: ENDOCRINOLOGY | Facility: CLINIC | Age: 60
End: 2020-06-17

## 2020-06-17 DIAGNOSIS — I50.22 CHRONIC SYSTOLIC HEART FAILURE (HCC): Primary | ICD-10-CM

## 2020-06-17 RX ORDER — LOSARTAN POTASSIUM 50 MG/1
50 TABLET ORAL 2 TIMES DAILY
Qty: 180 TABLET | Refills: 3 | Status: ON HOLD | OUTPATIENT
Start: 2020-06-17 | End: 2020-10-15 | Stop reason: SDUPTHER

## 2020-06-18 RX ORDER — TORSEMIDE 10 MG/1
10 TABLET ORAL DAILY
Qty: 30 TABLET | Refills: 5 | Status: SHIPPED | OUTPATIENT
Start: 2020-06-18 | End: 2020-06-30 | Stop reason: SDUPTHER

## 2020-06-22 ENCOUNTER — APPOINTMENT (OUTPATIENT)
Dept: LAB | Facility: CLINIC | Age: 60
End: 2020-06-22
Payer: MEDICARE

## 2020-06-22 ENCOUNTER — TRANSCRIBE ORDERS (OUTPATIENT)
Dept: LAB | Facility: CLINIC | Age: 60
End: 2020-06-22

## 2020-06-22 DIAGNOSIS — I50.22 CHRONIC SYSTOLIC HEART FAILURE (HCC): ICD-10-CM

## 2020-06-22 LAB
ANION GAP SERPL CALCULATED.3IONS-SCNC: 7 MMOL/L (ref 4–13)
BUN SERPL-MCNC: 16 MG/DL (ref 5–25)
CALCIUM SERPL-MCNC: 8.9 MG/DL (ref 8.3–10.1)
CHLORIDE SERPL-SCNC: 95 MMOL/L (ref 100–108)
CO2 SERPL-SCNC: 31 MMOL/L (ref 21–32)
CREAT SERPL-MCNC: 0.98 MG/DL (ref 0.6–1.3)
GFR SERPL CREATININE-BSD FRML MDRD: 83 ML/MIN/1.73SQ M
GLUCOSE SERPL-MCNC: 240 MG/DL (ref 65–140)
POTASSIUM SERPL-SCNC: 4.5 MMOL/L (ref 3.5–5.3)
SODIUM SERPL-SCNC: 133 MMOL/L (ref 136–145)

## 2020-06-22 PROCEDURE — 36415 COLL VENOUS BLD VENIPUNCTURE: CPT

## 2020-06-22 PROCEDURE — 80048 BASIC METABOLIC PNL TOTAL CA: CPT

## 2020-06-24 ENCOUNTER — TELEPHONE (OUTPATIENT)
Dept: CARDIOLOGY CLINIC | Facility: CLINIC | Age: 60
End: 2020-06-24

## 2020-06-29 ENCOUNTER — TELEPHONE (OUTPATIENT)
Dept: ENDOCRINOLOGY | Facility: CLINIC | Age: 60
End: 2020-06-29

## 2020-06-30 ENCOUNTER — OFFICE VISIT (OUTPATIENT)
Dept: CARDIOLOGY CLINIC | Facility: CLINIC | Age: 60
End: 2020-06-30
Payer: MEDICARE

## 2020-06-30 VITALS
OXYGEN SATURATION: 98 % | HEIGHT: 60 IN | HEART RATE: 92 BPM | DIASTOLIC BLOOD PRESSURE: 60 MMHG | BODY MASS INDEX: 55.17 KG/M2 | WEIGHT: 281 LBS | TEMPERATURE: 97.3 F | SYSTOLIC BLOOD PRESSURE: 110 MMHG

## 2020-06-30 DIAGNOSIS — I48.20 CHRONIC A-FIB (HCC): ICD-10-CM

## 2020-06-30 DIAGNOSIS — I25.10 CORONARY ARTERY DISEASE INVOLVING NATIVE HEART WITHOUT ANGINA PECTORIS, UNSPECIFIED VESSEL OR LESION TYPE: Primary | ICD-10-CM

## 2020-06-30 DIAGNOSIS — G47.33 OSA TREATED WITH BIPAP: ICD-10-CM

## 2020-06-30 DIAGNOSIS — I50.22 CHRONIC SYSTOLIC HEART FAILURE (HCC): ICD-10-CM

## 2020-06-30 DIAGNOSIS — I10 BENIGN ESSENTIAL HYPERTENSION: ICD-10-CM

## 2020-06-30 PROBLEM — I48.91 ATRIAL FIBRILLATION WITH RVR (HCC): Status: RESOLVED | Noted: 2018-12-11 | Resolved: 2020-06-30

## 2020-06-30 PROCEDURE — 3078F DIAST BP <80 MM HG: CPT | Performed by: INTERNAL MEDICINE

## 2020-06-30 PROCEDURE — 3074F SYST BP LT 130 MM HG: CPT | Performed by: INTERNAL MEDICINE

## 2020-06-30 PROCEDURE — 1036F TOBACCO NON-USER: CPT | Performed by: INTERNAL MEDICINE

## 2020-06-30 PROCEDURE — 93000 ELECTROCARDIOGRAM COMPLETE: CPT | Performed by: INTERNAL MEDICINE

## 2020-06-30 PROCEDURE — 3008F BODY MASS INDEX DOCD: CPT | Performed by: INTERNAL MEDICINE

## 2020-06-30 PROCEDURE — 99214 OFFICE O/P EST MOD 30 MIN: CPT | Performed by: INTERNAL MEDICINE

## 2020-06-30 RX ORDER — TORSEMIDE 20 MG/1
20 TABLET ORAL DAILY
Qty: 30 TABLET | Refills: 5 | Status: SHIPPED | OUTPATIENT
Start: 2020-06-30 | End: 2020-08-04 | Stop reason: SDUPTHER

## 2020-07-02 DIAGNOSIS — I50.22 CHRONIC SYSTOLIC HEART FAILURE (HCC): Primary | ICD-10-CM

## 2020-07-02 RX ORDER — TORSEMIDE 20 MG/1
20 TABLET ORAL DAILY
Qty: 14 TABLET | Refills: 0 | Status: SHIPPED | OUTPATIENT
Start: 2020-07-02 | End: 2020-09-03 | Stop reason: SDUPTHER

## 2020-07-02 NOTE — TELEPHONE ENCOUNTER
Torsemide refill for 2 weeks to [facility] pharmacy - 90 day refill was sent to [facility] mail order - pt will run out in 2 days

## 2020-07-06 ENCOUNTER — PATIENT OUTREACH (OUTPATIENT)
Dept: CASE MANAGEMENT | Facility: OTHER | Age: 60
End: 2020-07-06

## 2020-07-06 NOTE — PROGRESS NOTES
Patient states he did see his psychiatrist who decreased his AM Seroquel to 100 mg  He states he will start the new dose tomorrow when he starts next months pill packs  He reports persistent & increased fatigue  He has been taking 20 mg of Torsemide daily as ordered (he had extra pills at home)  He reports a 6 lb weight gain yesterday  He denies increased SOB or edema  He denies chest pain  He states [facility] is aware via telehealth  They told him they were going to make some phone calls  He states he will call cardio himself if he doesn't hear back  He confirms he is weighing himself when he first gets up, after he goes to the bathroom, before he eats/drinks, & wearing about the same clothing  He does state dietary indiscretions including eating a container of cottage cheese in 1 day  Reviewed again, reading food labels & to look for at least 20% or less sodium per serving with teach back  He shops online at [address] [place] [address] agrees to look for low sodium options

## 2020-07-07 ENCOUNTER — TELEPHONE (OUTPATIENT)
Dept: ENDOCRINOLOGY | Facility: CLINIC | Age: 60
End: 2020-07-07

## 2020-07-07 ENCOUNTER — PATIENT OUTREACH (OUTPATIENT)
Dept: CASE MANAGEMENT | Facility: OTHER | Age: 60
End: 2020-07-07

## 2020-07-07 NOTE — TELEPHONE ENCOUNTER
Freestyle labor report reviewed  Noted of hyperglycemia throughout the day  Please confirm with the patient what doses of medication he is taking-on certain days there appears to be documentation of Lantus 40 units only once a day  Please confirm if he is taking only once a day or has been taking it twice a day

## 2020-07-07 NOTE — PROGRESS NOTES
Patient states his weight is down 4 lbs today  He was seen by his visiting nurse who is continuing to monitor his weight  He states he did check the cottage cheese label & found that contains 18% sodium & there are 6 servings per container  Discussed with the patient that if the whole container is eaten in 1 day, he will have exceeded his sodium intake for the day just with the cottage cheese  Patient states understanding & agrees to spread it out over days & continuing to check food labels  Reviewed his insulins & meds  He is able to verbalize the correct doses & frequencies for each med  Reviewed a diabetic diet  He denies frequent or regular sugar use  He does not count carbs, but denies snacks or excess carbs throughout the day  He denies fever, aches, chills, wounds, increased SOB, dysuria, hematuria  He does state urinary hesitancy & said he reviewed the S&S of UTI with his visiting nurse  He is waiting for a response from endo regarding his Jerad Murders report  He will continue to monitor his weight & contact cardio for weight gain

## 2020-07-08 DIAGNOSIS — E11.42 DIABETIC POLYNEUROPATHY ASSOCIATED WITH TYPE 2 DIABETES MELLITUS (HCC): ICD-10-CM

## 2020-07-08 DIAGNOSIS — E11.8 TYPE 2 DIABETES MELLITUS WITH COMPLICATION, WITH LONG-TERM CURRENT USE OF INSULIN (HCC): ICD-10-CM

## 2020-07-08 DIAGNOSIS — E11.65 UNCONTROLLED TYPE 2 DIABETES MELLITUS WITH HYPERGLYCEMIA (HCC): ICD-10-CM

## 2020-07-08 DIAGNOSIS — Z79.4 TYPE 2 DIABETES MELLITUS WITH COMPLICATION, WITH LONG-TERM CURRENT USE OF INSULIN (HCC): ICD-10-CM

## 2020-07-08 NOTE — TELEPHONE ENCOUNTER
Please call in rise patient following  -increase basaglar to 48 units twice a day  - increase Humalog to 35 units with meals 3 times a day  -continue Victoza 1 8 mg subcutaneously daily, metformin 500 mg daily  -continue sliding scale insulin  -please advised patient to send log for review in 1-2 weeks  If insulin requirements continue to increase, will discuss potentially using insulin U500

## 2020-07-08 NOTE — TELEPHONE ENCOUNTER
Spoke to patient, provided dose adjustments for Basaglar and Humalog  Send BG in 1-2 weeks for review  Patient understood  Medication list updated

## 2020-07-08 NOTE — TELEPHONE ENCOUNTER
Spoke to patient, he provided the following medications with dosages that he is currently taking  He does not miss any doses      -Victoza 1 8 mg subcutaneously daily  - basaglar to 40 units subcutaneously twice a day  -Humalog to 30 units with meals 3 times a day  -metformin 500 mg orally daily

## 2020-07-10 ENCOUNTER — TELEPHONE (OUTPATIENT)
Dept: FAMILY MEDICINE CLINIC | Facility: CLINIC | Age: 60
End: 2020-07-10

## 2020-07-10 ENCOUNTER — TELEPHONE (OUTPATIENT)
Dept: CARDIOLOGY CLINIC | Facility: CLINIC | Age: 60
End: 2020-07-10

## 2020-07-10 ENCOUNTER — PATIENT OUTREACH (OUTPATIENT)
Dept: CASE MANAGEMENT | Facility: OTHER | Age: 60
End: 2020-07-10

## 2020-07-10 NOTE — PROGRESS NOTES
Patient states he is taking the new dose of basaglar of 48 units BID  He reports fasting BS of 198 & 212 before lunch today  Noted in chart & verbalized by the patient, his c/o right sided chest pain & pressure  He has a chronic cough, it is productive with a small amount of clear to smokey colored mucus, his baseline  He also has chronic SOB, no worse than his usual  He reports he used his Ventolin inhaler without result  He has not been using his nebulizer  He reports a 2 lb weight gain since yesterday & increased fatigue this week  He was instructed to go to the ED, but he wants to wait to hear back from cardio  Encouraged the patient to go to the ED, but he does not want to be back in the hospital  He agrees to call the squad for worsening symptoms & states he can also call his PCP tomorrow if no improvement

## 2020-07-10 NOTE — TELEPHONE ENCOUNTER
Complaints of right sided chest pain radiating into his arms, extreme fatigue and shortness of breath but he states the sob isnt new     bp 138/90 o2 98% hr 91     Also sent via tt

## 2020-07-10 NOTE — TELEPHONE ENCOUNTER
Spoke with Alonzo his BP is 138/90's, 98 percent O2, heart rate 91, pt is extremely tired , he has pain after he coughed hard (more like muscle , pain in upper right are , SOB ,when I spoke to him he was out of breath   I amos him he should go to ER but he really doesn't want to   So I had him ca;; cardio , he is awaiting a call back from Dr Darnelle Goodpasture  I told him if breathing gets worse go to ED and that you would be in office tomorrow if he did not hear back from cardio  tc/cma

## 2020-07-10 NOTE — TELEPHONE ENCOUNTER
Dr Vita Field,    Patient has been trying to get an orthopedic appt because of the pain he has been having  Dr Marco Pappas office would not see him because they said it is not back related  Patient is in extreme pain today    Please call to triage

## 2020-07-13 ENCOUNTER — OFFICE VISIT (OUTPATIENT)
Dept: PODIATRY | Facility: CLINIC | Age: 60
End: 2020-07-13
Payer: MEDICARE

## 2020-07-13 VITALS
WEIGHT: 281 LBS | DIASTOLIC BLOOD PRESSURE: 60 MMHG | HEART RATE: 92 BPM | RESPIRATION RATE: 17 BRPM | HEIGHT: 70 IN | SYSTOLIC BLOOD PRESSURE: 110 MMHG | BODY MASS INDEX: 40.23 KG/M2

## 2020-07-13 DIAGNOSIS — E11.42 DIABETIC POLYNEUROPATHY ASSOCIATED WITH TYPE 2 DIABETES MELLITUS (HCC): ICD-10-CM

## 2020-07-13 DIAGNOSIS — M54.9 MID BACK PAIN ON RIGHT SIDE: Primary | ICD-10-CM

## 2020-07-13 DIAGNOSIS — E11.8 DIABETIC FOOT (HCC): ICD-10-CM

## 2020-07-13 DIAGNOSIS — S90.212A SUBUNGUAL HEMATOMA OF GREAT TOE OF LEFT FOOT, INITIAL ENCOUNTER: Primary | ICD-10-CM

## 2020-07-13 PROCEDURE — 10160 PNXR ASPIR ABSC HMTMA BULLA: CPT | Performed by: PODIATRIST

## 2020-07-13 PROCEDURE — 99203 OFFICE O/P NEW LOW 30 MIN: CPT | Performed by: PODIATRIST

## 2020-07-13 NOTE — TELEPHONE ENCOUNTER
I spoke with patient, advised  He states he's still having chest pain, however, he does not think it's chest pain     Can you recommend a doctor that he can see in regards to this

## 2020-07-13 NOTE — TELEPHONE ENCOUNTER
Not sure if ortho will be able to help but can put order in for him     Has he tried heating pad, analgesic cream, etc?

## 2020-07-13 NOTE — TELEPHONE ENCOUNTER
Pt said thank you    He has tried tylenol and a cream that helps but doesn't take the pain away tc/cma

## 2020-07-13 NOTE — TELEPHONE ENCOUNTER
Please call pt to see if he is still taking his omeprazole and whther he is having any nausea/vomiting or dark stool in addition to the other symptoms

## 2020-07-14 ENCOUNTER — TELEMEDICINE (OUTPATIENT)
Dept: ENDOCRINOLOGY | Facility: CLINIC | Age: 60
End: 2020-07-14
Payer: MEDICARE

## 2020-07-14 DIAGNOSIS — Z79.4 TYPE 2 DIABETES MELLITUS WITH COMPLICATION, WITH LONG-TERM CURRENT USE OF INSULIN (HCC): ICD-10-CM

## 2020-07-14 DIAGNOSIS — E11.8 TYPE 2 DIABETES MELLITUS WITH COMPLICATION, WITH LONG-TERM CURRENT USE OF INSULIN (HCC): ICD-10-CM

## 2020-07-14 DIAGNOSIS — E11.65 UNCONTROLLED TYPE 2 DIABETES MELLITUS WITH HYPERGLYCEMIA (HCC): Primary | ICD-10-CM

## 2020-07-14 DIAGNOSIS — E11.42 DIABETIC POLYNEUROPATHY ASSOCIATED WITH TYPE 2 DIABETES MELLITUS (HCC): ICD-10-CM

## 2020-07-14 PROCEDURE — 99442 PR PHYS/QHP TELEPHONE EVALUATION 11-20 MIN: CPT | Performed by: INTERNAL MEDICINE
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
## 2020-07-15 ENCOUNTER — PATIENT OUTREACH (OUTPATIENT)
Dept: CASE MANAGEMENT | Facility: OTHER | Age: 60
End: 2020-07-15

## 2020-07-15 NOTE — PROGRESS NOTES
Patient states he is using a combination of extra strength tylenol & a topical analgesic to help manage his right chest pain  He states it is the same chest pain/discomfort that he first experienced after pneumonia in February, it is just exacerbated by more frequent coughing  He describes it as a sharp pain & tightness  He resumed using his nebulizer about 2x/day & splints with a pillow when he coughs  No questions following his endo appointment  He states he is being more careful with his diet & no middle of the night snacking  No recent changes in weight, SOB, or edema  Will follow up with the patient after he sees ortho

## 2020-07-20 ENCOUNTER — CONSULT (OUTPATIENT)
Dept: OBGYN CLINIC | Facility: CLINIC | Age: 60
End: 2020-07-20
Payer: MEDICARE

## 2020-07-20 VITALS
SYSTOLIC BLOOD PRESSURE: 112 MMHG | DIASTOLIC BLOOD PRESSURE: 78 MMHG | HEART RATE: 87 BPM | BODY MASS INDEX: 38.94 KG/M2 | HEIGHT: 70 IN | WEIGHT: 272 LBS | TEMPERATURE: 97.6 F

## 2020-07-20 DIAGNOSIS — R07.81 RIB PAIN ON RIGHT SIDE: ICD-10-CM

## 2020-07-20 DIAGNOSIS — R53.83 FATIGUE, UNSPECIFIED TYPE: ICD-10-CM

## 2020-07-20 DIAGNOSIS — R22.2 LUMP IN CHEST: Primary | ICD-10-CM

## 2020-07-20 PROCEDURE — 99203 OFFICE O/P NEW LOW 30 MIN: CPT | Performed by: ORTHOPAEDIC SURGERY
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
## 2020-07-21 ENCOUNTER — PATIENT OUTREACH (OUTPATIENT)
Dept: CASE MANAGEMENT | Facility: OTHER | Age: 60
End: 2020-07-21

## 2020-07-21 ENCOUNTER — TELEPHONE (OUTPATIENT)
Dept: CARDIOLOGY CLINIC | Facility: CLINIC | Age: 60
End: 2020-07-21

## 2020-07-21 NOTE — TELEPHONE ENCOUNTER
Tell him to keep monitoring and take an additional torsemide if he goes up 4 pounds from his usual weight  He can do this in the future without calling us    Thanks

## 2020-07-21 NOTE — PROGRESS NOTES
Patient was very happy with his ortho appointment & with Dr Madelyn Keen  He is hopeful he will find the cause of his right chest pain & a treatment  He states increased pain today following palpation of the area  He reports no changes in weight or edema  He states he "isn't having a good day"  Increased fatigue & slight increase in SOB  He will contact his PCP if it worsens

## 2020-07-21 NOTE — TELEPHONE ENCOUNTER
2 5 lb increase; VS okay, clear lungs, no peripheral edema; do you want an increase in his torsemide 20 mg tab - advise

## 2020-07-22 DIAGNOSIS — I48.91 ATRIAL FIBRILLATION WITH RVR (HCC): ICD-10-CM

## 2020-07-22 NOTE — TELEPHONE ENCOUNTER
Left v/m with VN Lorraine Pendleton to have pt follow Dr Geoffrey Hale instructions - add'l torsemide

## 2020-07-23 RX ORDER — DIGOXIN 250 MCG
250 TABLET ORAL DAILY
Qty: 90 TABLET | Refills: 3 | Status: SHIPPED | OUTPATIENT
Start: 2020-07-23 | End: 2020-08-04 | Stop reason: SDUPTHER

## 2020-08-02 ENCOUNTER — OFFICE VISIT (OUTPATIENT)
Dept: URGENT CARE | Facility: CLINIC | Age: 60
End: 2020-08-02
Payer: MEDICARE

## 2020-08-02 VITALS
OXYGEN SATURATION: 98 % | TEMPERATURE: 97.7 F | HEIGHT: 71 IN | BODY MASS INDEX: 38.36 KG/M2 | SYSTOLIC BLOOD PRESSURE: 137 MMHG | RESPIRATION RATE: 16 BRPM | WEIGHT: 274 LBS | HEART RATE: 91 BPM | DIASTOLIC BLOOD PRESSURE: 71 MMHG

## 2020-08-02 DIAGNOSIS — H61.23 BILATERAL IMPACTED CERUMEN: ICD-10-CM

## 2020-08-02 DIAGNOSIS — H60.392 OTHER INFECTIVE ACUTE OTITIS EXTERNA OF LEFT EAR: Primary | ICD-10-CM

## 2020-08-02 PROCEDURE — 1036F TOBACCO NON-USER: CPT | Performed by: PHYSICIAN ASSISTANT

## 2020-08-02 PROCEDURE — 69209 REMOVE IMPACTED EAR WAX UNI: CPT | Performed by: PHYSICIAN ASSISTANT

## 2020-08-02 PROCEDURE — 3008F BODY MASS INDEX DOCD: CPT | Performed by: PHYSICIAN ASSISTANT

## 2020-08-02 PROCEDURE — 3075F SYST BP GE 130 - 139MM HG: CPT | Performed by: PHYSICIAN ASSISTANT

## 2020-08-02 PROCEDURE — 3078F DIAST BP <80 MM HG: CPT | Performed by: PHYSICIAN ASSISTANT

## 2020-08-02 PROCEDURE — 99213 OFFICE O/P EST LOW 20 MIN: CPT | Performed by: PHYSICIAN ASSISTANT

## 2020-08-02 RX ORDER — OFLOXACIN 3 MG/ML
10 SOLUTION AURICULAR (OTIC) DAILY
Qty: 10 ML | Refills: 0 | Status: SHIPPED | OUTPATIENT
Start: 2020-08-02 | End: 2020-08-09

## 2020-08-02 NOTE — PATIENT INSTRUCTIONS
Begin using Debrox drops  Instill the ear drops as jacquelin Ruth with the ear facing upwards for 5 minutes after installation  Avoid using Q-Tips  Follow up with your family doctor in 3-5 days if symptoms persist   Proceed to the ER if symptoms worsen
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
## 2020-08-03 ENCOUNTER — HOSPITAL ENCOUNTER (OUTPATIENT)
Dept: RADIOLOGY | Facility: HOSPITAL | Age: 60
Discharge: HOME | End: 2020-08-03
Attending: ORTHOPAEDIC SURGERY
Payer: MEDICARE

## 2020-08-03 DIAGNOSIS — R53.83 FATIGUE, UNSPECIFIED TYPE: ICD-10-CM

## 2020-08-03 DIAGNOSIS — R07.81 RIB PAIN ON RIGHT SIDE: ICD-10-CM

## 2020-08-03 DIAGNOSIS — R22.2 LUMP IN CHEST: ICD-10-CM

## 2020-08-03 PROCEDURE — 71550 MRI CHEST W/O DYE: CPT

## 2020-08-04 ENCOUNTER — TELEMEDICINE (OUTPATIENT)
Dept: CARDIOLOGY CLINIC | Facility: CLINIC | Age: 60
End: 2020-08-04
Payer: MEDICARE

## 2020-08-04 VITALS — BODY MASS INDEX: 38.22 KG/M2 | WEIGHT: 274 LBS

## 2020-08-04 DIAGNOSIS — I48.91 ATRIAL FIBRILLATION WITH RVR (HCC): ICD-10-CM

## 2020-08-04 DIAGNOSIS — I50.22 CHRONIC SYSTOLIC HEART FAILURE (HCC): ICD-10-CM

## 2020-08-04 DIAGNOSIS — I25.10 CORONARY ARTERIOSCLEROSIS: ICD-10-CM

## 2020-08-04 PROCEDURE — 99442 PR PHYS/QHP TELEPHONE EVALUATION 11-20 MIN: CPT | Performed by: INTERNAL MEDICINE

## 2020-08-04 RX ORDER — DIGOXIN 250 MCG
250 TABLET ORAL DAILY
Qty: 90 TABLET | Refills: 3 | Status: SHIPPED | OUTPATIENT
Start: 2020-08-04 | End: 2021-07-09

## 2020-08-04 RX ORDER — TORSEMIDE 20 MG/1
20 TABLET ORAL DAILY
Qty: 90 TABLET | Refills: 3 | Status: SHIPPED | OUTPATIENT
Start: 2020-08-04 | End: 2020-08-10 | Stop reason: SDUPTHER

## 2020-08-04 RX ORDER — SPIRONOLACTONE 25 MG/1
25 TABLET ORAL DAILY
Qty: 90 TABLET | Refills: 2 | Status: SHIPPED | OUTPATIENT
Start: 2020-08-04 | End: 2021-03-26

## 2020-08-04 RX ORDER — APIXABAN 5 MG/1
5 TABLET, FILM COATED ORAL 2 TIMES DAILY
Qty: 180 TABLET | Refills: 3 | Status: SHIPPED | OUTPATIENT
Start: 2020-08-04 | End: 2021-07-06

## 2020-08-04 RX ORDER — ATORVASTATIN CALCIUM 40 MG/1
40 TABLET, FILM COATED ORAL DAILY
Qty: 90 TABLET | Refills: 3 | Status: ON HOLD | OUTPATIENT
Start: 2020-08-04 | End: 2020-10-15 | Stop reason: SDUPTHER
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
## 2020-08-05 ENCOUNTER — PATIENT OUTREACH (OUTPATIENT)
Dept: CASE MANAGEMENT | Facility: OTHER | Age: 60
End: 2020-08-05

## 2020-08-05 NOTE — PROGRESS NOTES
Checked in with the patient  He states his hearing improved with the ear wax removal, but his head still feels slightly congested  He is using the ear drops  He has an appointment with his ear doctor for Monday  Reviewed his cardio appointment  He verbalizes his plan of action for a weight gain  At present he reports mild edema BLLE, no weight gain, & no increased SOB  He follows up with ortho tomorrow regarding his MRI results  He will call me with any urgent questions or concerns, otherwise, he agrees to a follow up early next week

## 2020-08-06 ENCOUNTER — TELEPHONE (OUTPATIENT)
Dept: ENDOCRINOLOGY | Facility: CLINIC | Age: 60
End: 2020-08-06

## 2020-08-06 ENCOUNTER — TELEMEDICINE (OUTPATIENT)
Dept: OBGYN CLINIC | Facility: CLINIC | Age: 60
End: 2020-08-06
Payer: MEDICARE

## 2020-08-06 ENCOUNTER — TELEPHONE (OUTPATIENT)
Dept: OBGYN CLINIC | Facility: MEDICAL CENTER | Age: 60
End: 2020-08-06

## 2020-08-06 DIAGNOSIS — R07.81 RIB PAIN ON RIGHT SIDE: Primary | ICD-10-CM

## 2020-08-06 PROCEDURE — 99441 PR PHYS/QHP TELEPHONE EVALUATION 5-10 MIN: CPT | Performed by: ORTHOPAEDIC SURGERY
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
## 2020-08-06 NOTE — TELEPHONE ENCOUNTER
Patient sees dr Denise Mcmanus  Patient is calling in requesting his appointment today turn into a phone call [phone]  He is not feeling well  He wants his results from the MRI, and he thought it could be just a phone call  Please confirm

## 2020-08-10 DIAGNOSIS — I50.22 CHRONIC SYSTOLIC HEART FAILURE (HCC): ICD-10-CM

## 2020-08-11 ENCOUNTER — PATIENT OUTREACH (OUTPATIENT)
Dept: CASE MANAGEMENT | Facility: OTHER | Age: 60
End: 2020-08-11
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
## 2020-08-11 NOTE — PROGRESS NOTES
Patient saw his ENT his ears & "congestion" have resolved  No new orders  The chest pain continues to bother him due to intermittent productive cough  No change to mucus production  Fatigue persists  He had a 4 lb weight gain due to dietary indiscretions, took an extra dose of torsemide & lost the water weight  He denies increased edema or SOB  -Reviewed COPD triggers & S&S of exacerbation  -Reviewed S&S of fluid overload & low salt diet  -Reviewed medications    -Reviewed his action plans for exacerbations & when to call the provider, go to urgent care, or go to the ED  Patient is able to verbalize all of the above & demonstrates better understanding for management of his health  He was recertified with [facility] for 2 more months  Will call to remind the patient of his upcoming pulmonology & endo appointments  He states he is going to call his PCP to see when she would like to see him next

## 2020-08-12 RX ORDER — TORSEMIDE 20 MG/1
20 TABLET ORAL DAILY
Qty: 135 TABLET | Refills: 3 | Status: ON HOLD | OUTPATIENT
Start: 2020-08-12 | End: 2020-10-15 | Stop reason: SDUPTHER

## 2020-08-13 ENCOUNTER — TELEPHONE (OUTPATIENT)
Dept: ENDOCRINOLOGY | Facility: CLINIC | Age: 60
End: 2020-08-13

## 2020-08-13 DIAGNOSIS — E11.8 TYPE 2 DIABETES MELLITUS WITH COMPLICATION, WITH LONG-TERM CURRENT USE OF INSULIN (HCC): ICD-10-CM

## 2020-08-13 DIAGNOSIS — E11.42 DIABETIC POLYNEUROPATHY ASSOCIATED WITH TYPE 2 DIABETES MELLITUS (HCC): ICD-10-CM

## 2020-08-13 DIAGNOSIS — Z79.4 TYPE 2 DIABETES MELLITUS WITH COMPLICATION, WITH LONG-TERM CURRENT USE OF INSULIN (HCC): ICD-10-CM

## 2020-08-13 DIAGNOSIS — E11.65 UNCONTROLLED TYPE 2 DIABETES MELLITUS WITH HYPERGLYCEMIA (HCC): ICD-10-CM

## 2020-08-13 NOTE — TELEPHONE ENCOUNTER
Sheba report attached for review  Pt stated in mail that came with Norm Yary report:    Note:  Masood Barajas   When you see my reading is higher than normal it was from something i had to drink or eat

## 2020-08-13 NOTE — TELEPHONE ENCOUNTER
Called and spoke to patient, advised Gris Lerner report reviewed  Discussed the changes to medication regimen  Pt understood  Med list updated

## 2020-08-13 NOTE — TELEPHONE ENCOUNTER
Reviewed reprinted [place] report    Please call the patient with the following recommendations  Recommend the following for now:   -increase basaglar to 55 units subcutaneously twice a day  -increase Humalog to 40 units with meals 3 times a day  -continue sliding scale insulin  Continue Victoza, metformin at current dose

## 2020-08-13 NOTE — TELEPHONE ENCOUNTER
The right side of each page seems to have been cut off during scanning (after 4 pm)  Please scan in again   Thank you

## 2020-08-17 RX ORDER — INSULIN GLARGINE 100 [IU]/ML
INJECTION, SOLUTION SUBCUTANEOUS
Qty: 15 ML | Refills: 2
Start: 2020-08-17 | End: 2020-09-18 | Stop reason: SDUPTHER

## 2020-08-18 ENCOUNTER — TELEPHONE (OUTPATIENT)
Dept: ENDOCRINOLOGY | Facility: CLINIC | Age: 60
End: 2020-08-18

## 2020-08-18 NOTE — TELEPHONE ENCOUNTER
Received fax from [address] requested office notes dated from 5/1/19 through 1/31/20    Faxed notes to: [phone]

## 2020-08-22 ENCOUNTER — TELEPHONE (OUTPATIENT)
Dept: OTHER | Facility: OTHER | Age: 60
End: 2020-08-22

## 2020-08-22 NOTE — TELEPHONE ENCOUNTER
Recd a call from [place] from [facility] stating " Esther Madrigal has had a 10 and possibly more lb weight gain in 2 days and has a irregular heart beat today   Could you please have someone give me a call back @ [phone]

## 2020-08-24 ENCOUNTER — HOSPITAL ENCOUNTER (EMERGENCY)
Facility: HOSPITAL | Age: 60
Discharge: HOME | End: 2020-08-24
Attending: EMERGENCY MEDICINE
Payer: MEDICARE

## 2020-08-24 ENCOUNTER — APPOINTMENT (EMERGENCY)
Dept: RADIOLOGY | Facility: HOSPITAL | Age: 60
End: 2020-08-24
Payer: MEDICARE

## 2020-08-24 VITALS
OXYGEN SATURATION: 97 % | SYSTOLIC BLOOD PRESSURE: 149 MMHG | RESPIRATION RATE: 20 BRPM | TEMPERATURE: 98.5 F | HEART RATE: 80 BPM | DIASTOLIC BLOOD PRESSURE: 65 MMHG

## 2020-08-24 DIAGNOSIS — I50.9 CHF (CONGESTIVE HEART FAILURE) (HCC): Primary | ICD-10-CM

## 2020-08-24 DIAGNOSIS — J44.1 COPD EXACERBATION (HCC): ICD-10-CM

## 2020-08-24 DIAGNOSIS — R53.83 FATIGUE: ICD-10-CM

## 2020-08-24 LAB
ALBUMIN SERPL BCP-MCNC: 3.7 G/DL (ref 3.5–5)
ALP SERPL-CCNC: 63 U/L (ref 46–116)
ALT SERPL W P-5'-P-CCNC: 85 U/L (ref 12–78)
ANION GAP SERPL CALCULATED.3IONS-SCNC: 12 MMOL/L (ref 4–13)
APTT PPP: 25 SECONDS (ref 23–37)
AST SERPL W P-5'-P-CCNC: 67 U/L (ref 5–45)
ATRIAL RATE: 110 BPM
BASOPHILS # BLD AUTO: 0.04 THOUSANDS/ΜL (ref 0–0.1)
BASOPHILS NFR BLD AUTO: 0 % (ref 0–1)
BILIRUB SERPL-MCNC: 0.7 MG/DL (ref 0.2–1)
BILIRUB UR QL STRIP: NEGATIVE
BUN SERPL-MCNC: 20 MG/DL (ref 5–25)
CALCIUM SERPL-MCNC: 8.5 MG/DL (ref 8.3–10.1)
CHLORIDE SERPL-SCNC: 91 MMOL/L (ref 100–108)
CLARITY UR: CLEAR
CO2 SERPL-SCNC: 29 MMOL/L (ref 21–32)
COLOR UR: NORMAL
CREAT SERPL-MCNC: 1.11 MG/DL (ref 0.6–1.3)
DIGOXIN SERPL-MCNC: 1.8 NG/ML (ref 0.8–2)
EOSINOPHIL # BLD AUTO: 0.09 THOUSAND/ΜL (ref 0–0.61)
EOSINOPHIL NFR BLD AUTO: 1 % (ref 0–6)
ERYTHROCYTE [DISTWIDTH] IN BLOOD BY AUTOMATED COUNT: 12.8 % (ref 11.6–15.1)
GFR SERPL CREATININE-BSD FRML MDRD: 72 ML/MIN/1.73SQ M
GLUCOSE SERPL-MCNC: 257 MG/DL (ref 65–140)
GLUCOSE SERPL-MCNC: 268 MG/DL (ref 65–140)
GLUCOSE UR STRIP-MCNC: NEGATIVE MG/DL
HCT VFR BLD AUTO: 42.8 % (ref 36.5–49.3)
HGB BLD-MCNC: 14.7 G/DL (ref 12–17)
HGB UR QL STRIP.AUTO: NEGATIVE
IMM GRANULOCYTES # BLD AUTO: 0.1 THOUSAND/UL (ref 0–0.2)
IMM GRANULOCYTES NFR BLD AUTO: 1 % (ref 0–2)
INR PPP: 1.14 (ref 0.84–1.19)
KETONES UR STRIP-MCNC: NEGATIVE MG/DL
LEUKOCYTE ESTERASE UR QL STRIP: NEGATIVE
LYMPHOCYTES # BLD AUTO: 4.17 THOUSANDS/ΜL (ref 0.6–4.47)
LYMPHOCYTES NFR BLD AUTO: 45 % (ref 14–44)
MCH RBC QN AUTO: 32.7 PG (ref 26.8–34.3)
MCHC RBC AUTO-ENTMCNC: 34.3 G/DL (ref 31.4–37.4)
MCV RBC AUTO: 95 FL (ref 82–98)
MONOCYTES # BLD AUTO: 0.59 THOUSAND/ΜL (ref 0.17–1.22)
MONOCYTES NFR BLD AUTO: 6 % (ref 4–12)
NEUTROPHILS # BLD AUTO: 4.25 THOUSANDS/ΜL (ref 1.85–7.62)
NEUTS SEG NFR BLD AUTO: 47 % (ref 43–75)
NITRITE UR QL STRIP: NEGATIVE
NRBC BLD AUTO-RTO: 0 /100 WBCS
NT-PROBNP SERPL-MCNC: 322 PG/ML
PH UR STRIP.AUTO: 6 [PH]
PLATELET # BLD AUTO: 144 THOUSANDS/UL (ref 149–390)
PMV BLD AUTO: 10 FL (ref 8.9–12.7)
POTASSIUM SERPL-SCNC: 4.1 MMOL/L (ref 3.5–5.3)
PROT SERPL-MCNC: 7.4 G/DL (ref 6.4–8.2)
PROT UR STRIP-MCNC: NEGATIVE MG/DL
PROTHROMBIN TIME: 14.5 SECONDS (ref 11.6–14.5)
QRS AXIS: 72 DEGREES
QRSD INTERVAL: 88 MS
QT INTERVAL: 362 MS
QTC INTERVAL: 438 MS
RBC # BLD AUTO: 4.49 MILLION/UL (ref 3.88–5.62)
SODIUM SERPL-SCNC: 132 MMOL/L (ref 136–145)
SP GR UR STRIP.AUTO: 1.01 (ref 1–1.03)
T WAVE AXIS: 30 DEGREES
TROPONIN I SERPL-MCNC: <0.02 NG/ML
UROBILINOGEN UR QL STRIP.AUTO: 0.2 E.U./DL
VENTRICULAR RATE: 88 BPM
WBC # BLD AUTO: 9.24 THOUSAND/UL (ref 4.31–10.16)

## 2020-08-24 PROCEDURE — 93010 ELECTROCARDIOGRAM REPORT: CPT | Performed by: INTERNAL MEDICINE

## 2020-08-24 PROCEDURE — 85610 PROTHROMBIN TIME: CPT | Performed by: EMERGENCY MEDICINE

## 2020-08-24 PROCEDURE — 85025 COMPLETE CBC W/AUTO DIFF WBC: CPT | Performed by: EMERGENCY MEDICINE

## 2020-08-24 PROCEDURE — 96372 THER/PROPH/DIAG INJ SC/IM: CPT

## 2020-08-24 PROCEDURE — 93005 ELECTROCARDIOGRAM TRACING: CPT

## 2020-08-24 PROCEDURE — 99282 EMERGENCY DEPT VISIT SF MDM: CPT | Performed by: INTERNAL MEDICINE

## 2020-08-24 PROCEDURE — 80162 ASSAY OF DIGOXIN TOTAL: CPT | Performed by: EMERGENCY MEDICINE

## 2020-08-24 PROCEDURE — 83880 ASSAY OF NATRIURETIC PEPTIDE: CPT | Performed by: EMERGENCY MEDICINE

## 2020-08-24 PROCEDURE — 71045 X-RAY EXAM CHEST 1 VIEW: CPT

## 2020-08-24 PROCEDURE — 80053 COMPREHEN METABOLIC PANEL: CPT | Performed by: EMERGENCY MEDICINE

## 2020-08-24 PROCEDURE — 81003 URINALYSIS AUTO W/O SCOPE: CPT | Performed by: EMERGENCY MEDICINE

## 2020-08-24 PROCEDURE — 96374 THER/PROPH/DIAG INJ IV PUSH: CPT

## 2020-08-24 PROCEDURE — 85730 THROMBOPLASTIN TIME PARTIAL: CPT | Performed by: EMERGENCY MEDICINE

## 2020-08-24 PROCEDURE — 99285 EMERGENCY DEPT VISIT HI MDM: CPT

## 2020-08-24 PROCEDURE — 82948 REAGENT STRIP/BLOOD GLUCOSE: CPT

## 2020-08-24 PROCEDURE — 36415 COLL VENOUS BLD VENIPUNCTURE: CPT | Performed by: EMERGENCY MEDICINE

## 2020-08-24 PROCEDURE — 99285 EMERGENCY DEPT VISIT HI MDM: CPT | Performed by: EMERGENCY MEDICINE

## 2020-08-24 PROCEDURE — 84484 ASSAY OF TROPONIN QUANT: CPT | Performed by: EMERGENCY MEDICINE

## 2020-08-24 RX ORDER — FUROSEMIDE 10 MG/ML
80 INJECTION INTRAMUSCULAR; INTRAVENOUS ONCE
Status: COMPLETED | OUTPATIENT
Start: 2020-08-24 | End: 2020-08-24

## 2020-08-24 RX ADMIN — FUROSEMIDE 80 MG: 10 INJECTION, SOLUTION INTRAMUSCULAR; INTRAVENOUS at 14:50

## 2020-08-24 RX ADMIN — INSULIN LISPRO 37.2 UNITS: 100 INJECTION, SOLUTION INTRAVENOUS; SUBCUTANEOUS at 18:03

## 2020-08-24 NOTE — ED PROCEDURE NOTE
PROCEDURE  ECG 12 Lead Documentation Only    Date/Time: 8/24/2020 12:12 PM  Performed by: Yovani Stevens DO  Authorized by: Yovani Stevens DO     ECG reviewed by me, the ED Provider: yes    Patient location:  ED  Interpretation:     Interpretation: abnormal    Rate:     ECG rate:  88    ECG rate assessment: normal    Rhythm:     Rhythm: atrial fibrillation    Ectopy:     Ectopy: none    ST segments:     ST segments:  Normal  T waves:     T waves: normal           Yovani Stevens DO  08/24/20 1213

## 2020-08-24 NOTE — ED NOTES
As per Tonie Garcia will be here at 19 00  Pt made aware and meal is ordered as per pt requested        Pily Avalos RN  08/24/20 [id number]
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
## 2020-08-24 NOTE — ED NOTES
Pt states that he requires squad for transport home, pt ambulates with walker and uses 2-3L O2 via [place]       Jenny King RN  08/24/20 [id number]

## 2020-08-24 NOTE — TELEPHONE ENCOUNTER
Weight fluctuating daily 5-6 pounds  Increased fatigue, increased sob  Leg edema +3 and +2  Regular; irregular heartbeat  Extra torsemide and extra digoxin taken Saturday and Sunday  No way to be seen; due to transportation  Should he be taken to ER? Verbal conversation with Dr Irlanda Arriaga,   Patient will proceed to the ER by squad

## 2020-08-25 ENCOUNTER — TELEPHONE (OUTPATIENT)
Dept: CARDIOLOGY CLINIC | Facility: CLINIC | Age: 60
End: 2020-08-25

## 2020-08-25 ENCOUNTER — OFFICE VISIT (OUTPATIENT)
Dept: PULMONOLOGY | Facility: MEDICAL CENTER | Age: 60
End: 2020-08-25
Payer: MEDICARE

## 2020-08-25 VITALS
HEART RATE: 82 BPM | WEIGHT: 281 LBS | TEMPERATURE: 98.6 F | OXYGEN SATURATION: 95 % | DIASTOLIC BLOOD PRESSURE: 60 MMHG | HEIGHT: 71 IN | BODY MASS INDEX: 39.34 KG/M2 | RESPIRATION RATE: 16 BRPM | SYSTOLIC BLOOD PRESSURE: 118 MMHG

## 2020-08-25 DIAGNOSIS — G47.33 OSA TREATED WITH BIPAP: ICD-10-CM

## 2020-08-25 DIAGNOSIS — J44.9 OSA AND COPD OVERLAP SYNDROME (HCC): Primary | ICD-10-CM

## 2020-08-25 DIAGNOSIS — J96.11 CHRONIC RESPIRATORY FAILURE WITH HYPOXIA (HCC): ICD-10-CM

## 2020-08-25 DIAGNOSIS — G47.33 OSA AND COPD OVERLAP SYNDROME (HCC): Primary | ICD-10-CM

## 2020-08-25 PROBLEM — R65.20 SEVERE SEPSIS (HCC): Status: RESOLVED | Noted: 2020-02-02 | Resolved: 2020-08-25

## 2020-08-25 PROBLEM — J18.9 COMMUNITY ACQUIRED PNEUMONIA: Status: RESOLVED | Noted: 2020-02-02 | Resolved: 2020-08-25

## 2020-08-25 PROBLEM — J18.9 PNEUMONIA OF RIGHT LOWER LOBE DUE TO INFECTIOUS ORGANISM: Status: RESOLVED | Noted: 2020-04-11 | Resolved: 2020-08-25

## 2020-08-25 PROBLEM — A41.9 SEVERE SEPSIS (HCC): Status: RESOLVED | Noted: 2020-02-02 | Resolved: 2020-08-25

## 2020-08-25 PROCEDURE — 1036F TOBACCO NON-USER: CPT | Performed by: NURSE PRACTITIONER

## 2020-08-25 PROCEDURE — 3078F DIAST BP <80 MM HG: CPT | Performed by: NURSE PRACTITIONER

## 2020-08-25 PROCEDURE — 3074F SYST BP LT 130 MM HG: CPT | Performed by: NURSE PRACTITIONER

## 2020-08-25 PROCEDURE — 3008F BODY MASS INDEX DOCD: CPT | Performed by: NURSE PRACTITIONER

## 2020-08-25 PROCEDURE — 99214 OFFICE O/P EST MOD 30 MIN: CPT | Performed by: NURSE PRACTITIONER

## 2020-08-25 NOTE — TELEPHONE ENCOUNTER
I called to make a follow up from er for patient I gave him the appt but he has questions regarding his medication please call
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
## 2020-08-25 NOTE — ASSESSMENT & PLAN NOTE
Patient was diagnosed with mild-to-moderate sleep apnea in 2016  Compliance data for the last 30 days shows 100% compliance  This is on a BiPAP 12/5 cm of water pressure  His AHI is reduced to 1 1 events per hour  Average IPAP is 13 6 an average EPAP is 6 6  He is also using 2 L of supplemental oxygen with his auto BiPAP  This is likely secondary to alveolar hypoventilation syndrome  He continues to use an benefit

## 2020-08-25 NOTE — ASSESSMENT & PLAN NOTE
Patient continues to use an benefit from supplemental oxygen  He has history of chronic systolic heart failure, alveolar hypoventilation obesity syndrome and centrilobular emphysema  His room air oxygen at rest is 94%  With ambulating 100 ft was 88%  On 2 L of supplemental oxygen , at rest his O2 sat is 98% with ambulating  feet on 2 L is 96%  He continues to use an benefit from a POC

## 2020-08-25 NOTE — ASSESSMENT & PLAN NOTE
Patient has history of chronic bronchitis  He is a former smoker  His last pulmonary function test was done in May of 2019  His forced vital capacity was 2 20 L or 49% of predicted, FEV1 was 1 58 L or 46% of predicted obstruction ratio 72%  Patient currently is on optimal therapy  This includes the use of triple maintenance inhaler Trelegy 1 puff daily  He rarely needs rescue inhalation  He does have shortness of breath that is likely secondary to chronic heart failure  Currently he is stable  Plan includes repeat pulmonary function test when COVID-19 restrictions are lifted

## 2020-08-26 DIAGNOSIS — E11.65 UNCONTROLLED TYPE 2 DIABETES MELLITUS WITH HYPERGLYCEMIA (HCC): ICD-10-CM

## 2020-08-27 ENCOUNTER — TELEMEDICINE (OUTPATIENT)
Dept: ENDOCRINOLOGY | Facility: CLINIC | Age: 60
End: 2020-08-27
Payer: MEDICARE

## 2020-08-27 ENCOUNTER — PATIENT OUTREACH (OUTPATIENT)
Dept: CASE MANAGEMENT | Facility: OTHER | Age: 60
End: 2020-08-27

## 2020-08-27 DIAGNOSIS — Z79.4 TYPE 2 DIABETES MELLITUS WITH COMPLICATION, WITH LONG-TERM CURRENT USE OF INSULIN (HCC): Primary | ICD-10-CM

## 2020-08-27 DIAGNOSIS — E11.8 TYPE 2 DIABETES MELLITUS WITH COMPLICATION, WITH LONG-TERM CURRENT USE OF INSULIN (HCC): Primary | ICD-10-CM

## 2020-08-27 PROCEDURE — 99442 PR PHYS/QHP TELEPHONE EVALUATION 11-20 MIN: CPT | Performed by: INTERNAL MEDICINE
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
## 2020-08-27 NOTE — PROGRESS NOTES
Patient states he is back to his baseline  As discussed with cardio, he continues to take Torsemide BID until his follow up with them on 9/3/20  Reviewed a low sodium diet in detail  Patient states he is working to avoid sodium, but does not always read labels & tends to make easier to prepare meals  He does get meals on wheels  Suggested a few small changes which he agrees to try  Reviewed fluid intake  He states he stays below less than 2L/day  Reviewed what counts as a fluid  He states understanding  He states he continues to feel more fatigued & states frustration that he is not doing better since he is following all instructions & has regular provider appointments  Discussed comorbidities and chronic illnesses, medications, & depression  He continues to see behavioral health  Will follow up after his cardio appointment

## 2020-09-03 ENCOUNTER — TELEMEDICINE (OUTPATIENT)
Dept: CARDIOLOGY CLINIC | Facility: CLINIC | Age: 60
End: 2020-09-03
Payer: MEDICARE

## 2020-09-03 VITALS
HEART RATE: 74 BPM | BODY MASS INDEX: 38.64 KG/M2 | SYSTOLIC BLOOD PRESSURE: 120 MMHG | WEIGHT: 276 LBS | DIASTOLIC BLOOD PRESSURE: 76 MMHG | HEIGHT: 71 IN

## 2020-09-03 DIAGNOSIS — I50.22 CHRONIC SYSTOLIC HEART FAILURE (HCC): ICD-10-CM

## 2020-09-03 DIAGNOSIS — G47.33 OSA TREATED WITH BIPAP: Primary | ICD-10-CM

## 2020-09-03 DIAGNOSIS — I25.10 CORONARY ARTERY DISEASE INVOLVING NATIVE HEART WITHOUT ANGINA PECTORIS, UNSPECIFIED VESSEL OR LESION TYPE: ICD-10-CM

## 2020-09-03 DIAGNOSIS — I48.20 CHRONIC A-FIB (HCC): ICD-10-CM

## 2020-09-03 DIAGNOSIS — J98.4 LUNG DISEASE, RESTRICTIVE: ICD-10-CM

## 2020-09-03 DIAGNOSIS — I10 BENIGN ESSENTIAL HYPERTENSION: ICD-10-CM

## 2020-09-03 DIAGNOSIS — I48.0 PAF (PAROXYSMAL ATRIAL FIBRILLATION) (HCC): ICD-10-CM

## 2020-09-03 PROCEDURE — 99214 OFFICE O/P EST MOD 30 MIN: CPT | Performed by: INTERNAL MEDICINE
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
## 2020-09-09 ENCOUNTER — PATIENT OUTREACH (OUTPATIENT)
Dept: CASE MANAGEMENT | Facility: OTHER | Age: 60
End: 2020-09-09

## 2020-09-09 NOTE — PROGRESS NOTES
Patient states he feels congested today & has a cough with the same clear to "smokey white" sputum  He has a "little wheeze"  He was seen by his visiting nurse & told his lungs are clear  His SpO2 on O2 @ 2LPM via n/c at rest earlier today was 94%  He has been using his nebulizer with slight improvement  He reports no change in edema or weight  His temp was 98 0  He agrees to contact his PCP in the morning if no improvement  Recommended he ask about allergy meds  He agrees to an afternoon f/u call with this OP CM  Briefly discussed his cardio appointment & recommendations  He has not yet implemented the dietary changes they discusses  He states his visiting nurse is going to help schedule the cardio appointment so she can be present

## 2020-09-10 ENCOUNTER — PATIENT OUTREACH (OUTPATIENT)
Dept: CASE MANAGEMENT | Facility: OTHER | Age: 60
End: 2020-09-10

## 2020-09-10 NOTE — PROGRESS NOTES
Called the patient to check in on his congestion, cough, & mild SOB  No change  He will continue to monitor his symptoms  He states he will call his PCP tomorrow to request an order for the flu shot & to schedule a routine appointment

## 2020-09-11 ENCOUNTER — TELEMEDICINE (OUTPATIENT)
Dept: FAMILY MEDICINE CLINIC | Facility: CLINIC | Age: 60
End: 2020-09-11
Payer: MEDICARE

## 2020-09-11 DIAGNOSIS — J20.9 ACUTE BRONCHITIS, UNSPECIFIED ORGANISM: Primary | ICD-10-CM

## 2020-09-11 PROCEDURE — 99213 OFFICE O/P EST LOW 20 MIN: CPT | Performed by: NURSE PRACTITIONER
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
## 2020-09-17 ENCOUNTER — TELEPHONE (OUTPATIENT)
Dept: ENDOCRINOLOGY | Facility: CLINIC | Age: 60
End: 2020-09-17

## 2020-09-17 NOTE — TELEPHONE ENCOUNTER
Covering for Dr Eliza Garcia    Please let pt know I reviewed Sheba CGM report    -BGs are too high in 200-300s    Please increase basaglar to 60 units qAM and qHS  Increase novolog to 45 units TID AC      Schedule in office follow up 9/30/20 at 9:10AM slot    Need to discuss concentrated U-500 insulin because his BGs are not getting any better    Is he eating a lot of carbs?

## 2020-09-18 ENCOUNTER — PATIENT OUTREACH (OUTPATIENT)
Dept: CASE MANAGEMENT | Facility: OTHER | Age: 60
End: 2020-09-18

## 2020-09-18 DIAGNOSIS — E11.42 DIABETIC POLYNEUROPATHY ASSOCIATED WITH TYPE 2 DIABETES MELLITUS (HCC): ICD-10-CM

## 2020-09-18 DIAGNOSIS — E11.8 TYPE 2 DIABETES MELLITUS WITH COMPLICATION, WITH LONG-TERM CURRENT USE OF INSULIN (HCC): ICD-10-CM

## 2020-09-18 DIAGNOSIS — Z79.4 TYPE 2 DIABETES MELLITUS WITH COMPLICATION, WITH LONG-TERM CURRENT USE OF INSULIN (HCC): ICD-10-CM

## 2020-09-18 DIAGNOSIS — E11.65 UNCONTROLLED TYPE 2 DIABETES MELLITUS WITH HYPERGLYCEMIA (HCC): ICD-10-CM

## 2020-09-18 RX ORDER — INSULIN GLARGINE 100 [IU]/ML
INJECTION, SOLUTION SUBCUTANEOUS
Qty: 15 ML | Refills: 2 | Status: ON HOLD
Start: 2020-09-18 | End: 2020-10-15 | Stop reason: SDUPTHER

## 2020-09-18 NOTE — TELEPHONE ENCOUNTER
Spoke to pt, provided dose adjustments for basaglar and novolog  Pt understood  Scheduled phone appt for 9/30 @ 9:10 am, he stated he does not want to come into office

## 2020-09-25 ENCOUNTER — PATIENT OUTREACH (OUTPATIENT)
Dept: CASE MANAGEMENT | Facility: OTHER | Age: 60
End: 2020-09-25

## 2020-09-25 DIAGNOSIS — E11.65 UNCONTROLLED TYPE 2 DIABETES MELLITUS WITH HYPERGLYCEMIA (HCC): ICD-10-CM

## 2020-09-25 RX ORDER — GABAPENTIN 100 MG/1
CAPSULE ORAL
Qty: 90 CAPSULE | Refills: 10 | Status: SHIPPED | OUTPATIENT
Start: 2020-09-25 | End: 2021-06-14 | Stop reason: SDUPTHER

## 2020-09-25 NOTE — PROGRESS NOTES
Patient is doing well  He reports the swelling is down in his legs  He continues to monitor his vitals daily with telehealth, but has all of his own equipment to continue when the program ends  He is still extremely fatigued, but has a new goal of walking 500 steps/day  He will try to increase that to 1000 steps/day in the next 1-2 weeks  He tries not to nap during the day by staying out of his room, watching TV, listening to music, using social media, & engaging with his aid  He tries to keep his BS between 100-150 & administers insulin per endocrinology's instructions making sure to eat afterwards & limiting his sugar intake  No questions or needs at this time

## 2020-09-28 DIAGNOSIS — Z79.4 TYPE 2 DIABETES MELLITUS WITH COMPLICATION, WITH LONG-TERM CURRENT USE OF INSULIN (HCC): ICD-10-CM

## 2020-09-28 DIAGNOSIS — E11.8 TYPE 2 DIABETES MELLITUS WITH COMPLICATION, WITH LONG-TERM CURRENT USE OF INSULIN (HCC): ICD-10-CM

## 2020-09-28 RX ORDER — LIRAGLUTIDE 6 MG/ML
INJECTION SUBCUTANEOUS
Qty: 24 ML | Refills: 1 | Status: SHIPPED | OUTPATIENT
Start: 2020-09-28 | End: 2021-03-01

## 2020-09-30 ENCOUNTER — TELEPHONE (OUTPATIENT)
Dept: ENDOCRINOLOGY | Facility: CLINIC | Age: 60
End: 2020-09-30

## 2020-10-02 ENCOUNTER — TELEMEDICINE (OUTPATIENT)
Dept: FAMILY MEDICINE CLINIC | Facility: CLINIC | Age: 60
End: 2020-10-02
Payer: MEDICARE

## 2020-10-02 VITALS
HEIGHT: 71 IN | WEIGHT: 275.5 LBS | BODY MASS INDEX: 38.57 KG/M2 | SYSTOLIC BLOOD PRESSURE: 124 MMHG | DIASTOLIC BLOOD PRESSURE: 80 MMHG | TEMPERATURE: 97.5 F | OXYGEN SATURATION: 95 % | HEART RATE: 88 BPM

## 2020-10-02 DIAGNOSIS — I48.20 CHRONIC A-FIB (HCC): ICD-10-CM

## 2020-10-02 DIAGNOSIS — E78.5 HYPERLIPIDEMIA, UNSPECIFIED HYPERLIPIDEMIA TYPE: ICD-10-CM

## 2020-10-02 DIAGNOSIS — R53.83 FATIGUE, UNSPECIFIED TYPE: ICD-10-CM

## 2020-10-02 DIAGNOSIS — D53.9 NUTRITIONAL ANEMIA, UNSPECIFIED: ICD-10-CM

## 2020-10-02 DIAGNOSIS — E53.8 B12 DEFICIENCY: ICD-10-CM

## 2020-10-02 DIAGNOSIS — E55.9 VITAMIN D DEFICIENCY: ICD-10-CM

## 2020-10-02 DIAGNOSIS — E11.65 UNCONTROLLED TYPE 2 DIABETES MELLITUS WITH HYPERGLYCEMIA (HCC): Primary | ICD-10-CM

## 2020-10-02 DIAGNOSIS — R53.82 CHRONIC FATIGUE: Primary | ICD-10-CM

## 2020-10-02 DIAGNOSIS — D72.829 LEUKOCYTOSIS, UNSPECIFIED TYPE: ICD-10-CM

## 2020-10-02 PROCEDURE — 99213 OFFICE O/P EST LOW 20 MIN: CPT | Performed by: FAMILY MEDICINE

## 2020-10-02 RX ORDER — CYANOCOBALAMIN 1000 UG/ML
1000 INJECTION INTRAMUSCULAR; SUBCUTANEOUS
Qty: 10 ML | Refills: 0 | Status: SHIPPED | OUTPATIENT
Start: 2020-10-02 | End: 2021-04-30

## 2020-10-09 ENCOUNTER — TELEPHONE (OUTPATIENT)
Dept: FAMILY MEDICINE CLINIC | Facility: CLINIC | Age: 60
End: 2020-10-09

## 2020-10-11 ENCOUNTER — APPOINTMENT (EMERGENCY)
Dept: RADIOLOGY | Facility: HOSPITAL | Age: 60
DRG: 291 | End: 2020-10-11
Payer: MEDICARE

## 2020-10-11 ENCOUNTER — TELEPHONE (OUTPATIENT)
Dept: OTHER | Facility: OTHER | Age: 60
End: 2020-10-11

## 2020-10-11 ENCOUNTER — HOSPITAL ENCOUNTER (INPATIENT)
Facility: HOSPITAL | Age: 60
LOS: 4 days | Discharge: HOME HEALTH | DRG: 291 | End: 2020-10-15
Attending: EMERGENCY MEDICINE | Admitting: FAMILY MEDICINE
Payer: MEDICARE

## 2020-10-11 DIAGNOSIS — I50.43 ACUTE ON CHRONIC COMBINED SYSTOLIC AND DIASTOLIC HEART FAILURE (HCC): ICD-10-CM

## 2020-10-11 DIAGNOSIS — N18.2 STAGE 2 CHRONIC KIDNEY DISEASE: ICD-10-CM

## 2020-10-11 DIAGNOSIS — I25.10 CORONARY ARTERIOSCLEROSIS: ICD-10-CM

## 2020-10-11 DIAGNOSIS — I50.9 CHF (CONGESTIVE HEART FAILURE) (HCC): ICD-10-CM

## 2020-10-11 DIAGNOSIS — M62.838 MUSCLE SPASM: ICD-10-CM

## 2020-10-11 DIAGNOSIS — Z79.4 TYPE 2 DIABETES MELLITUS WITH COMPLICATION, WITH LONG-TERM CURRENT USE OF INSULIN (HCC): ICD-10-CM

## 2020-10-11 DIAGNOSIS — Z86.39 H/O VITAMIN D DEFICIENCY: ICD-10-CM

## 2020-10-11 DIAGNOSIS — E11.8 TYPE 2 DIABETES MELLITUS WITH COMPLICATION, WITH LONG-TERM CURRENT USE OF INSULIN (HCC): ICD-10-CM

## 2020-10-11 DIAGNOSIS — E11.65 UNCONTROLLED TYPE 2 DIABETES MELLITUS WITH HYPERGLYCEMIA (HCC): Chronic | ICD-10-CM

## 2020-10-11 DIAGNOSIS — I50.22 CHRONIC SYSTOLIC HEART FAILURE (HCC): ICD-10-CM

## 2020-10-11 DIAGNOSIS — R07.9 CHEST PAIN: ICD-10-CM

## 2020-10-11 DIAGNOSIS — I50.9 CHF EXACERBATION (HCC): Primary | ICD-10-CM

## 2020-10-11 DIAGNOSIS — I48.20 CHRONIC A-FIB (HCC): ICD-10-CM

## 2020-10-11 DIAGNOSIS — E11.42 DIABETIC POLYNEUROPATHY ASSOCIATED WITH TYPE 2 DIABETES MELLITUS (HCC): ICD-10-CM

## 2020-10-11 PROBLEM — J44.9 COPD (CHRONIC OBSTRUCTIVE PULMONARY DISEASE) (HCC): Status: ACTIVE | Noted: 2020-10-11

## 2020-10-11 PROBLEM — R42 DIZZINESS: Status: ACTIVE | Noted: 2020-10-11

## 2020-10-11 PROBLEM — I50.23 ACUTE ON CHRONIC SYSTOLIC HEART FAILURE (HCC): Status: ACTIVE | Noted: 2020-02-03

## 2020-10-11 LAB
ALBUMIN SERPL BCP-MCNC: 3.7 G/DL (ref 3.5–5)
ALP SERPL-CCNC: 117 U/L (ref 46–116)
ALT SERPL W P-5'-P-CCNC: 68 U/L (ref 12–78)
ANION GAP SERPL CALCULATED.3IONS-SCNC: 12 MMOL/L (ref 4–13)
ARTERIAL PATENCY WRIST A: YES
AST SERPL W P-5'-P-CCNC: 55 U/L (ref 5–45)
BASE EXCESS BLDA CALC-SCNC: 1.9 MMOL/L
BASOPHILS # BLD AUTO: 0.03 THOUSANDS/ΜL (ref 0–0.1)
BASOPHILS NFR BLD AUTO: 0 % (ref 0–1)
BILIRUB SERPL-MCNC: 0.4 MG/DL (ref 0.2–1)
BODY TEMPERATURE: 99.2 DEGREES FEHRENHEIT
BUN SERPL-MCNC: 23 MG/DL (ref 5–25)
CALCIUM SERPL-MCNC: 9.2 MG/DL (ref 8.3–10.1)
CHLORIDE SERPL-SCNC: 92 MMOL/L (ref 100–108)
CO2 SERPL-SCNC: 28 MMOL/L (ref 21–32)
CREAT SERPL-MCNC: 1.34 MG/DL (ref 0.6–1.3)
EOSINOPHIL # BLD AUTO: 0.11 THOUSAND/ΜL (ref 0–0.61)
EOSINOPHIL NFR BLD AUTO: 1 % (ref 0–6)
ERYTHROCYTE [DISTWIDTH] IN BLOOD BY AUTOMATED COUNT: 12.3 % (ref 11.6–15.1)
GFR SERPL CREATININE-BSD FRML MDRD: 57 ML/MIN/1.73SQ M
GLUCOSE SERPL-MCNC: 371 MG/DL (ref 65–140)
GLUCOSE SERPL-MCNC: 411 MG/DL (ref 65–140)
HCO3 BLDA-SCNC: 27.7 MMOL/L (ref 22–28)
HCT VFR BLD AUTO: 39.7 % (ref 36.5–49.3)
HGB BLD-MCNC: 14 G/DL (ref 12–17)
IMM GRANULOCYTES # BLD AUTO: 0.18 THOUSAND/UL (ref 0–0.2)
IMM GRANULOCYTES NFR BLD AUTO: 2 % (ref 0–2)
LYMPHOCYTES # BLD AUTO: 4.11 THOUSANDS/ΜL (ref 0.6–4.47)
LYMPHOCYTES NFR BLD AUTO: 42 % (ref 14–44)
MAGNESIUM SERPL-MCNC: 1.6 MG/DL (ref 1.6–2.6)
MCH RBC QN AUTO: 33.7 PG (ref 26.8–34.3)
MCHC RBC AUTO-ENTMCNC: 35.3 G/DL (ref 31.4–37.4)
MCV RBC AUTO: 95 FL (ref 82–98)
MONOCYTES # BLD AUTO: 0.64 THOUSAND/ΜL (ref 0.17–1.22)
MONOCYTES NFR BLD AUTO: 7 % (ref 4–12)
NASAL CANNULA: 3
NEUTROPHILS # BLD AUTO: 4.67 THOUSANDS/ΜL (ref 1.85–7.62)
NEUTS SEG NFR BLD AUTO: 48 % (ref 43–75)
NRBC BLD AUTO-RTO: 0 /100 WBCS
NT-PROBNP SERPL-MCNC: 272 PG/ML
O2 CT BLDA-SCNC: 19.3 ML/DL (ref 16–23)
OXYHGB MFR BLDA: 96.1 % (ref 94–97)
PCO2 BLDA: 47.7 MM HG (ref 36–44)
PCO2 TEMP ADJ BLDA: 48.3 MM HG (ref 36–44)
PH BLD: 7.38 [PH] (ref 7.35–7.45)
PH BLDA: 7.38 [PH] (ref 7.35–7.45)
PLATELET # BLD AUTO: 151 THOUSANDS/UL (ref 149–390)
PMV BLD AUTO: 10 FL (ref 8.9–12.7)
PO2 BLD: 96.8 MM HG (ref 75–129)
PO2 BLDA: 95 MM HG (ref 75–129)
POTASSIUM SERPL-SCNC: 4.6 MMOL/L (ref 3.5–5.3)
PROT SERPL-MCNC: 7 G/DL (ref 6.4–8.2)
RBC # BLD AUTO: 4.16 MILLION/UL (ref 3.88–5.62)
SARS-COV-2 RNA RESP QL NAA+PROBE: NEGATIVE
SODIUM SERPL-SCNC: 132 MMOL/L (ref 136–145)
SPECIMEN SOURCE: ABNORMAL
TROPONIN I SERPL-MCNC: <0.02 NG/ML
TROPONIN I SERPL-MCNC: <0.02 NG/ML
WBC # BLD AUTO: 9.74 THOUSAND/UL (ref 4.31–10.16)

## 2020-10-11 PROCEDURE — 94760 N-INVAS EAR/PLS OXIMETRY 1: CPT

## 2020-10-11 PROCEDURE — 94660 CPAP INITIATION&MGMT: CPT

## 2020-10-11 PROCEDURE — 85025 COMPLETE CBC W/AUTO DIFF WBC: CPT | Performed by: EMERGENCY MEDICINE

## 2020-10-11 PROCEDURE — 87635 SARS-COV-2 COVID-19 AMP PRB: CPT | Performed by: EMERGENCY MEDICINE

## 2020-10-11 PROCEDURE — 36415 COLL VENOUS BLD VENIPUNCTURE: CPT | Performed by: EMERGENCY MEDICINE

## 2020-10-11 PROCEDURE — 94664 DEMO&/EVAL PT USE INHALER: CPT

## 2020-10-11 PROCEDURE — 80053 COMPREHEN METABOLIC PANEL: CPT | Performed by: EMERGENCY MEDICINE

## 2020-10-11 PROCEDURE — G1004 CDSM NDSC: HCPCS

## 2020-10-11 PROCEDURE — 94640 AIRWAY INHALATION TREATMENT: CPT

## 2020-10-11 PROCEDURE — 70496 CT ANGIOGRAPHY HEAD: CPT

## 2020-10-11 PROCEDURE — 84484 ASSAY OF TROPONIN QUANT: CPT | Performed by: EMERGENCY MEDICINE

## 2020-10-11 PROCEDURE — 99285 EMERGENCY DEPT VISIT HI MDM: CPT | Performed by: EMERGENCY MEDICINE

## 2020-10-11 PROCEDURE — 82805 BLOOD GASES W/O2 SATURATION: CPT | Performed by: EMERGENCY MEDICINE

## 2020-10-11 PROCEDURE — 99223 1ST HOSP IP/OBS HIGH 75: CPT | Performed by: FAMILY MEDICINE

## 2020-10-11 PROCEDURE — 83880 ASSAY OF NATRIURETIC PEPTIDE: CPT | Performed by: EMERGENCY MEDICINE

## 2020-10-11 PROCEDURE — 93005 ELECTROCARDIOGRAM TRACING: CPT

## 2020-10-11 PROCEDURE — 99285 EMERGENCY DEPT VISIT HI MDM: CPT

## 2020-10-11 PROCEDURE — 84484 ASSAY OF TROPONIN QUANT: CPT | Performed by: NURSE PRACTITIONER

## 2020-10-11 PROCEDURE — 83735 ASSAY OF MAGNESIUM: CPT | Performed by: EMERGENCY MEDICINE

## 2020-10-11 PROCEDURE — 96374 THER/PROPH/DIAG INJ IV PUSH: CPT

## 2020-10-11 PROCEDURE — 71045 X-RAY EXAM CHEST 1 VIEW: CPT

## 2020-10-11 PROCEDURE — 36600 WITHDRAWAL OF ARTERIAL BLOOD: CPT

## 2020-10-11 PROCEDURE — 82948 REAGENT STRIP/BLOOD GLUCOSE: CPT

## 2020-10-11 PROCEDURE — 70498 CT ANGIOGRAPHY NECK: CPT

## 2020-10-11 RX ORDER — ALPRAZOLAM 0.5 MG/1
2 TABLET ORAL
Status: DISCONTINUED | OUTPATIENT
Start: 2020-10-11 | End: 2020-10-15 | Stop reason: HOSPADM

## 2020-10-11 RX ORDER — PANTOPRAZOLE SODIUM 40 MG/1
40 TABLET, DELAYED RELEASE ORAL
Status: DISCONTINUED | OUTPATIENT
Start: 2020-10-11 | End: 2020-10-15 | Stop reason: HOSPADM

## 2020-10-11 RX ORDER — QUETIAPINE 300 MG/1
300 TABLET, FILM COATED, EXTENDED RELEASE ORAL EVERY MORNING
Status: DISCONTINUED | OUTPATIENT
Start: 2020-10-12 | End: 2020-10-12

## 2020-10-11 RX ORDER — GUAIFENESIN 600 MG
600 TABLET, EXTENDED RELEASE 12 HR ORAL EVERY 12 HOURS SCHEDULED
Status: DISCONTINUED | OUTPATIENT
Start: 2020-10-11 | End: 2020-10-15 | Stop reason: HOSPADM

## 2020-10-11 RX ORDER — BUSPIRONE HYDROCHLORIDE 10 MG/1
10 TABLET ORAL 2 TIMES DAILY
Status: DISCONTINUED | OUTPATIENT
Start: 2020-10-11 | End: 2020-10-15 | Stop reason: HOSPADM

## 2020-10-11 RX ORDER — FUROSEMIDE 10 MG/ML
40 INJECTION INTRAMUSCULAR; INTRAVENOUS 2 TIMES DAILY
Status: DISCONTINUED | OUTPATIENT
Start: 2020-10-12 | End: 2020-10-14

## 2020-10-11 RX ORDER — GABAPENTIN 100 MG/1
100 CAPSULE ORAL 3 TIMES DAILY
Status: DISCONTINUED | OUTPATIENT
Start: 2020-10-11 | End: 2020-10-15 | Stop reason: HOSPADM

## 2020-10-11 RX ORDER — ATORVASTATIN CALCIUM 40 MG/1
40 TABLET, FILM COATED ORAL
Status: DISCONTINUED | OUTPATIENT
Start: 2020-10-12 | End: 2020-10-15 | Stop reason: HOSPADM

## 2020-10-11 RX ORDER — METOPROLOL SUCCINATE 100 MG/1
200 TABLET, EXTENDED RELEASE ORAL DAILY
Status: DISCONTINUED | OUTPATIENT
Start: 2020-10-12 | End: 2020-10-15 | Stop reason: HOSPADM

## 2020-10-11 RX ORDER — SPIRONOLACTONE 25 MG/1
25 TABLET ORAL DAILY
Status: DISCONTINUED | OUTPATIENT
Start: 2020-10-12 | End: 2020-10-15 | Stop reason: HOSPADM

## 2020-10-11 RX ORDER — LEVALBUTEROL INHALATION SOLUTION 0.63 MG/3ML
0.63 SOLUTION RESPIRATORY (INHALATION) EVERY 4 HOURS PRN
Status: DISCONTINUED | OUTPATIENT
Start: 2020-10-11 | End: 2020-10-15 | Stop reason: HOSPADM

## 2020-10-11 RX ORDER — QUETIAPINE FUMARATE 300 MG/1
300 TABLET, FILM COATED ORAL
COMMUNITY
End: 2022-07-29

## 2020-10-11 RX ORDER — ACETAMINOPHEN 325 MG/1
650 TABLET ORAL EVERY 4 HOURS PRN
Status: DISCONTINUED | OUTPATIENT
Start: 2020-10-11 | End: 2020-10-15 | Stop reason: HOSPADM

## 2020-10-11 RX ORDER — MAGNESIUM SULFATE 1 G/100ML
1 INJECTION INTRAVENOUS ONCE
Status: COMPLETED | OUTPATIENT
Start: 2020-10-11 | End: 2020-10-11

## 2020-10-11 RX ORDER — FUROSEMIDE 10 MG/ML
40 INJECTION INTRAMUSCULAR; INTRAVENOUS ONCE
Status: COMPLETED | OUTPATIENT
Start: 2020-10-11 | End: 2020-10-11

## 2020-10-11 RX ORDER — LOSARTAN POTASSIUM 50 MG/1
50 TABLET ORAL 2 TIMES DAILY
Status: CANCELLED | OUTPATIENT
Start: 2020-10-11

## 2020-10-11 RX ORDER — DIGOXIN 125 MCG
250 TABLET ORAL DAILY
Status: DISCONTINUED | OUTPATIENT
Start: 2020-10-12 | End: 2020-10-15 | Stop reason: HOSPADM

## 2020-10-11 RX ORDER — ASPIRIN 81 MG/1
81 TABLET ORAL DAILY
Status: DISCONTINUED | OUTPATIENT
Start: 2020-10-12 | End: 2020-10-15 | Stop reason: HOSPADM

## 2020-10-11 RX ORDER — FLUTICASONE FUROATE AND VILANTEROL 100; 25 UG/1; UG/1
1 POWDER RESPIRATORY (INHALATION) DAILY
Status: DISCONTINUED | OUTPATIENT
Start: 2020-10-12 | End: 2020-10-15 | Stop reason: HOSPADM

## 2020-10-11 RX ORDER — QUETIAPINE FUMARATE 100 MG/1
300 TABLET, FILM COATED ORAL
Status: DISCONTINUED | OUTPATIENT
Start: 2020-10-11 | End: 2020-10-15 | Stop reason: HOSPADM

## 2020-10-11 RX ORDER — INSULIN GLARGINE 100 [IU]/ML
66 INJECTION, SOLUTION SUBCUTANEOUS EVERY 12 HOURS SCHEDULED
Status: DISCONTINUED | OUTPATIENT
Start: 2020-10-11 | End: 2020-10-15 | Stop reason: HOSPADM

## 2020-10-11 RX ORDER — ICOSAPENT ETHYL 1000 MG/1
2 CAPSULE ORAL 2 TIMES DAILY
Status: DISCONTINUED | OUTPATIENT
Start: 2020-10-12 | End: 2020-10-12 | Stop reason: CLARIF

## 2020-10-11 RX ADMIN — SERTRALINE HYDROCHLORIDE 50 MG: 50 TABLET ORAL at 21:27

## 2020-10-11 RX ADMIN — PANTOPRAZOLE SODIUM 40 MG: 40 TABLET, DELAYED RELEASE ORAL at 21:26

## 2020-10-11 RX ADMIN — ALPRAZOLAM 2 MG: 0.5 TABLET ORAL at 23:10

## 2020-10-11 RX ADMIN — IOHEXOL 100 ML: 350 INJECTION, SOLUTION INTRAVENOUS at 17:34

## 2020-10-11 RX ADMIN — QUETIAPINE FUMARATE 300 MG: 100 TABLET ORAL at 21:27

## 2020-10-11 RX ADMIN — MAGNESIUM SULFATE HEPTAHYDRATE 1 G: 1 INJECTION, SOLUTION INTRAVENOUS at 21:27

## 2020-10-11 RX ADMIN — GUAIFENESIN 600 MG: 600 TABLET, EXTENDED RELEASE ORAL at 21:27

## 2020-10-11 RX ADMIN — APIXABAN 5 MG: 5 TABLET, FILM COATED ORAL at 21:26

## 2020-10-11 RX ADMIN — FUROSEMIDE 40 MG: 10 INJECTION, SOLUTION INTRAMUSCULAR; INTRAVENOUS at 16:31

## 2020-10-11 RX ADMIN — INSULIN GLARGINE 66 UNITS: 100 INJECTION, SOLUTION SUBCUTANEOUS at 23:10

## 2020-10-11 RX ADMIN — LEVALBUTEROL HYDROCHLORIDE 0.63 MG: 0.63 SOLUTION RESPIRATORY (INHALATION) at 22:15

## 2020-10-11 RX ADMIN — BUSPIRONE HYDROCHLORIDE 10 MG: 10 TABLET ORAL at 21:26

## 2020-10-11 RX ADMIN — INSULIN LISPRO 3 UNITS: 100 INJECTION, SOLUTION INTRAVENOUS; SUBCUTANEOUS at 23:10

## 2020-10-11 RX ADMIN — GABAPENTIN 100 MG: 100 CAPSULE ORAL at 21:26

## 2020-10-12 ENCOUNTER — APPOINTMENT (INPATIENT)
Dept: NON INVASIVE DIAGNOSTICS | Facility: HOSPITAL | Age: 60
DRG: 291 | End: 2020-10-12
Payer: MEDICARE

## 2020-10-12 LAB
ALBUMIN SERPL BCP-MCNC: 3.6 G/DL (ref 3.5–5)
ALP SERPL-CCNC: 72 U/L (ref 46–116)
ALT SERPL W P-5'-P-CCNC: 69 U/L (ref 12–78)
ANION GAP SERPL CALCULATED.3IONS-SCNC: 10 MMOL/L (ref 4–13)
AST SERPL W P-5'-P-CCNC: 48 U/L (ref 5–45)
BILIRUB SERPL-MCNC: 0.5 MG/DL (ref 0.2–1)
BUN SERPL-MCNC: 23 MG/DL (ref 5–25)
CALCIUM SERPL-MCNC: 9.4 MG/DL (ref 8.3–10.1)
CHLORIDE SERPL-SCNC: 96 MMOL/L (ref 100–108)
CHOLEST SERPL-MCNC: 136 MG/DL (ref 50–200)
CO2 SERPL-SCNC: 29 MMOL/L (ref 21–32)
CREAT SERPL-MCNC: 1.21 MG/DL (ref 0.6–1.3)
EST. AVERAGE GLUCOSE BLD GHB EST-MCNC: 192 MG/DL
GFR SERPL CREATININE-BSD FRML MDRD: 64 ML/MIN/1.73SQ M
GLUCOSE SERPL-MCNC: 223 MG/DL (ref 65–140)
GLUCOSE SERPL-MCNC: 228 MG/DL (ref 65–140)
GLUCOSE SERPL-MCNC: 232 MG/DL (ref 65–140)
GLUCOSE SERPL-MCNC: 243 MG/DL (ref 65–140)
GLUCOSE SERPL-MCNC: 265 MG/DL (ref 65–140)
HBA1C MFR BLD: 8.3 %
HDLC SERPL-MCNC: 34 MG/DL
LDLC SERPL DIRECT ASSAY-MCNC: 48 MG/DL (ref 0–100)
MAGNESIUM SERPL-MCNC: 2.1 MG/DL (ref 1.6–2.6)
POTASSIUM SERPL-SCNC: 3.7 MMOL/L (ref 3.5–5.3)
PROT SERPL-MCNC: 7 G/DL (ref 6.4–8.2)
SODIUM SERPL-SCNC: 135 MMOL/L (ref 136–145)
T4 FREE SERPL-MCNC: 0.87 NG/DL (ref 0.76–1.46)
TRIGL SERPL-MCNC: 445 MG/DL
TROPONIN I SERPL-MCNC: <0.02 NG/ML
TSH SERPL DL<=0.05 MIU/L-ACNC: 2.14 UIU/ML (ref 0.36–3.74)

## 2020-10-12 PROCEDURE — G0008 ADMIN INFLUENZA VIRUS VAC: HCPCS | Performed by: FAMILY MEDICINE

## 2020-10-12 PROCEDURE — 83735 ASSAY OF MAGNESIUM: CPT | Performed by: NURSE PRACTITIONER

## 2020-10-12 PROCEDURE — 93306 TTE W/DOPPLER COMPLETE: CPT

## 2020-10-12 PROCEDURE — 99232 SBSQ HOSP IP/OBS MODERATE 35: CPT | Performed by: NURSE PRACTITIONER

## 2020-10-12 PROCEDURE — 84439 ASSAY OF FREE THYROXINE: CPT | Performed by: NURSE PRACTITIONER

## 2020-10-12 PROCEDURE — 90682 RIV4 VACC RECOMBINANT DNA IM: CPT | Performed by: FAMILY MEDICINE

## 2020-10-12 PROCEDURE — 83721 ASSAY OF BLOOD LIPOPROTEIN: CPT | Performed by: NURSE PRACTITIONER

## 2020-10-12 PROCEDURE — 83036 HEMOGLOBIN GLYCOSYLATED A1C: CPT | Performed by: NURSE PRACTITIONER

## 2020-10-12 PROCEDURE — 82948 REAGENT STRIP/BLOOD GLUCOSE: CPT

## 2020-10-12 PROCEDURE — 84443 ASSAY THYROID STIM HORMONE: CPT | Performed by: NURSE PRACTITIONER

## 2020-10-12 PROCEDURE — 94760 N-INVAS EAR/PLS OXIMETRY 1: CPT

## 2020-10-12 PROCEDURE — 94640 AIRWAY INHALATION TREATMENT: CPT

## 2020-10-12 PROCEDURE — 99222 1ST HOSP IP/OBS MODERATE 55: CPT | Performed by: INTERNAL MEDICINE

## 2020-10-12 PROCEDURE — 80053 COMPREHEN METABOLIC PANEL: CPT | Performed by: NURSE PRACTITIONER

## 2020-10-12 PROCEDURE — 84484 ASSAY OF TROPONIN QUANT: CPT | Performed by: NURSE PRACTITIONER

## 2020-10-12 PROCEDURE — 80061 LIPID PANEL: CPT | Performed by: NURSE PRACTITIONER

## 2020-10-12 PROCEDURE — 94660 CPAP INITIATION&MGMT: CPT

## 2020-10-12 PROCEDURE — 97162 PT EVAL MOD COMPLEX 30 MIN: CPT

## 2020-10-12 PROCEDURE — 97530 THERAPEUTIC ACTIVITIES: CPT

## 2020-10-12 RX ORDER — QUETIAPINE 200 MG/1
200 TABLET, FILM COATED, EXTENDED RELEASE ORAL EVERY MORNING
Status: DISCONTINUED | OUTPATIENT
Start: 2020-10-12 | End: 2020-10-15 | Stop reason: HOSPADM

## 2020-10-12 RX ADMIN — ALPRAZOLAM 2 MG: 0.5 TABLET ORAL at 21:22

## 2020-10-12 RX ADMIN — DICLOFENAC SODIUM 2 G: 10 GEL TOPICAL at 17:05

## 2020-10-12 RX ADMIN — GABAPENTIN 100 MG: 100 CAPSULE ORAL at 09:43

## 2020-10-12 RX ADMIN — SERTRALINE HYDROCHLORIDE 50 MG: 50 TABLET ORAL at 21:22

## 2020-10-12 RX ADMIN — INSULIN LISPRO 30 UNITS: 100 INJECTION, SOLUTION INTRAVENOUS; SUBCUTANEOUS at 08:29

## 2020-10-12 RX ADMIN — FLUTICASONE FUROATE AND VILANTEROL TRIFENATATE 1 PUFF: 100; 25 POWDER RESPIRATORY (INHALATION) at 09:51

## 2020-10-12 RX ADMIN — INSULIN LISPRO 30 UNITS: 100 INJECTION, SOLUTION INTRAVENOUS; SUBCUTANEOUS at 12:48

## 2020-10-12 RX ADMIN — QUETIAPINE FUMARATE 200 MG: 200 TABLET, EXTENDED RELEASE ORAL at 09:48

## 2020-10-12 RX ADMIN — PANTOPRAZOLE SODIUM 40 MG: 40 TABLET, DELAYED RELEASE ORAL at 21:22

## 2020-10-12 RX ADMIN — DICLOFENAC SODIUM 2 G: 10 GEL TOPICAL at 12:48

## 2020-10-12 RX ADMIN — GUAIFENESIN 600 MG: 600 TABLET, EXTENDED RELEASE ORAL at 09:42

## 2020-10-12 RX ADMIN — DIGOXIN 250 MCG: 125 TABLET ORAL at 09:43

## 2020-10-12 RX ADMIN — METOPROLOL SUCCINATE 200 MG: 100 TABLET, EXTENDED RELEASE ORAL at 09:46

## 2020-10-12 RX ADMIN — INSULIN LISPRO 2 UNITS: 100 INJECTION, SOLUTION INTRAVENOUS; SUBCUTANEOUS at 21:21

## 2020-10-12 RX ADMIN — LEVALBUTEROL HYDROCHLORIDE 0.63 MG: 0.63 SOLUTION RESPIRATORY (INHALATION) at 07:26

## 2020-10-12 RX ADMIN — QUETIAPINE FUMARATE 300 MG: 100 TABLET ORAL at 21:22

## 2020-10-12 RX ADMIN — ACETAMINOPHEN 650 MG: 325 TABLET, FILM COATED ORAL at 09:42

## 2020-10-12 RX ADMIN — INSULIN GLARGINE 66 UNITS: 100 INJECTION, SOLUTION SUBCUTANEOUS at 21:21

## 2020-10-12 RX ADMIN — ATORVASTATIN CALCIUM 40 MG: 40 TABLET, FILM COATED ORAL at 17:02

## 2020-10-12 RX ADMIN — APIXABAN 5 MG: 5 TABLET, FILM COATED ORAL at 09:43

## 2020-10-12 RX ADMIN — DICLOFENAC SODIUM 2 G: 10 GEL TOPICAL at 21:20

## 2020-10-12 RX ADMIN — INFLUENZA A VIRUS A/HAWAII/70/2019 (H1N1) RECOMBINANT HEMAGGLUTININ ANTIGEN, INFLUENZA A VIRUS A/MINNESOTA/41/2019 (H3N2) RECOMBINANT HEMAGGLUTININ ANTIGEN, INFLUENZA B VIRUS B/WASHINGTON/02/2019 RECOMBINANT HEMAGGLUTININ ANTIGEN, AND INFLUENZA B VIRUS B/PHUKET/3073/2013 RECOMBINANT HEMAGGLUTININ ANTIGEN 0.5 ML: 45; 45; 45; 45 INJECTION INTRAMUSCULAR at 05:23

## 2020-10-12 RX ADMIN — BUSPIRONE HYDROCHLORIDE 10 MG: 10 TABLET ORAL at 17:05

## 2020-10-12 RX ADMIN — GABAPENTIN 100 MG: 100 CAPSULE ORAL at 17:02

## 2020-10-12 RX ADMIN — ACETAMINOPHEN 650 MG: 325 TABLET, FILM COATED ORAL at 21:29

## 2020-10-12 RX ADMIN — DICLOFENAC SODIUM 2 G: 10 GEL TOPICAL at 09:41

## 2020-10-12 RX ADMIN — APIXABAN 5 MG: 5 TABLET, FILM COATED ORAL at 17:05

## 2020-10-12 RX ADMIN — SPIRONOLACTONE 25 MG: 25 TABLET, FILM COATED ORAL at 09:48

## 2020-10-12 RX ADMIN — GUAIFENESIN 600 MG: 600 TABLET, EXTENDED RELEASE ORAL at 21:30

## 2020-10-12 RX ADMIN — GABAPENTIN 100 MG: 100 CAPSULE ORAL at 21:22

## 2020-10-12 RX ADMIN — BUSPIRONE HYDROCHLORIDE 10 MG: 10 TABLET ORAL at 09:48

## 2020-10-12 RX ADMIN — FUROSEMIDE 40 MG: 10 INJECTION, SOLUTION INTRAMUSCULAR; INTRAVENOUS at 17:02

## 2020-10-12 RX ADMIN — INSULIN GLARGINE 66 UNITS: 100 INJECTION, SOLUTION SUBCUTANEOUS at 09:46

## 2020-10-12 RX ADMIN — INSULIN LISPRO 1 UNITS: 100 INJECTION, SOLUTION INTRAVENOUS; SUBCUTANEOUS at 08:29

## 2020-10-12 RX ADMIN — Medication 1 TABLET: at 09:42

## 2020-10-12 RX ADMIN — INSULIN LISPRO 30 UNITS: 100 INJECTION, SOLUTION INTRAVENOUS; SUBCUTANEOUS at 17:03

## 2020-10-12 RX ADMIN — INSULIN LISPRO 2 UNITS: 100 INJECTION, SOLUTION INTRAVENOUS; SUBCUTANEOUS at 12:48

## 2020-10-12 RX ADMIN — FUROSEMIDE 40 MG: 10 INJECTION, SOLUTION INTRAMUSCULAR; INTRAVENOUS at 09:42

## 2020-10-12 RX ADMIN — INSULIN LISPRO 2 UNITS: 100 INJECTION, SOLUTION INTRAVENOUS; SUBCUTANEOUS at 17:03

## 2020-10-12 RX ADMIN — ASPIRIN 81 MG: 81 TABLET, COATED ORAL at 09:48

## 2020-10-12 RX ADMIN — TIOTROPIUM BROMIDE 18 MCG: 18 CAPSULE ORAL; RESPIRATORY (INHALATION) at 09:51

## 2020-10-13 LAB
ANION GAP SERPL CALCULATED.3IONS-SCNC: 10 MMOL/L (ref 4–13)
ATRIAL RATE: 113 BPM
BUN SERPL-MCNC: 21 MG/DL (ref 5–25)
CALCIUM SERPL-MCNC: 9 MG/DL (ref 8.3–10.1)
CHLORIDE SERPL-SCNC: 98 MMOL/L (ref 100–108)
CO2 SERPL-SCNC: 30 MMOL/L (ref 21–32)
CREAT SERPL-MCNC: 1 MG/DL (ref 0.6–1.3)
ERYTHROCYTE [DISTWIDTH] IN BLOOD BY AUTOMATED COUNT: 12.2 % (ref 11.6–15.1)
GFR SERPL CREATININE-BSD FRML MDRD: 81 ML/MIN/1.73SQ M
GLUCOSE SERPL-MCNC: 140 MG/DL (ref 65–140)
GLUCOSE SERPL-MCNC: 152 MG/DL (ref 65–140)
GLUCOSE SERPL-MCNC: 197 MG/DL (ref 65–140)
GLUCOSE SERPL-MCNC: 219 MG/DL (ref 65–140)
GLUCOSE SERPL-MCNC: 281 MG/DL (ref 65–140)
HCT VFR BLD AUTO: 42.3 % (ref 36.5–49.3)
HGB BLD-MCNC: 14.5 G/DL (ref 12–17)
MAGNESIUM SERPL-MCNC: 2.2 MG/DL (ref 1.6–2.6)
MCH RBC QN AUTO: 33.1 PG (ref 26.8–34.3)
MCHC RBC AUTO-ENTMCNC: 34.3 G/DL (ref 31.4–37.4)
MCV RBC AUTO: 97 FL (ref 82–98)
PLATELET # BLD AUTO: 152 THOUSANDS/UL (ref 149–390)
PMV BLD AUTO: 9.9 FL (ref 8.9–12.7)
POTASSIUM SERPL-SCNC: 3.5 MMOL/L (ref 3.5–5.3)
QRS AXIS: 73 DEGREES
QRSD INTERVAL: 92 MS
QT INTERVAL: 322 MS
QTC INTERVAL: 421 MS
RBC # BLD AUTO: 4.38 MILLION/UL (ref 3.88–5.62)
SODIUM SERPL-SCNC: 138 MMOL/L (ref 136–145)
T WAVE AXIS: -54 DEGREES
VENTRICULAR RATE: 103 BPM
WBC # BLD AUTO: 8.95 THOUSAND/UL (ref 4.31–10.16)

## 2020-10-13 PROCEDURE — 93010 ELECTROCARDIOGRAM REPORT: CPT | Performed by: INTERNAL MEDICINE

## 2020-10-13 PROCEDURE — 82948 REAGENT STRIP/BLOOD GLUCOSE: CPT

## 2020-10-13 PROCEDURE — 94760 N-INVAS EAR/PLS OXIMETRY 1: CPT

## 2020-10-13 PROCEDURE — 80048 BASIC METABOLIC PNL TOTAL CA: CPT | Performed by: NURSE PRACTITIONER

## 2020-10-13 PROCEDURE — 97167 OT EVAL HIGH COMPLEX 60 MIN: CPT

## 2020-10-13 PROCEDURE — 97535 SELF CARE MNGMENT TRAINING: CPT

## 2020-10-13 PROCEDURE — 93306 TTE W/DOPPLER COMPLETE: CPT | Performed by: INTERNAL MEDICINE

## 2020-10-13 PROCEDURE — 99232 SBSQ HOSP IP/OBS MODERATE 35: CPT | Performed by: INTERNAL MEDICINE

## 2020-10-13 PROCEDURE — 94660 CPAP INITIATION&MGMT: CPT

## 2020-10-13 PROCEDURE — 85027 COMPLETE CBC AUTOMATED: CPT | Performed by: NURSE PRACTITIONER

## 2020-10-13 PROCEDURE — 99232 SBSQ HOSP IP/OBS MODERATE 35: CPT | Performed by: NURSE PRACTITIONER

## 2020-10-13 PROCEDURE — 83735 ASSAY OF MAGNESIUM: CPT | Performed by: NURSE PRACTITIONER

## 2020-10-13 RX ORDER — POTASSIUM CHLORIDE 20 MEQ/1
40 TABLET, EXTENDED RELEASE ORAL ONCE
Status: COMPLETED | OUTPATIENT
Start: 2020-10-13 | End: 2020-10-13

## 2020-10-13 RX ADMIN — GUAIFENESIN 600 MG: 600 TABLET, EXTENDED RELEASE ORAL at 20:21

## 2020-10-13 RX ADMIN — APIXABAN 5 MG: 5 TABLET, FILM COATED ORAL at 17:36

## 2020-10-13 RX ADMIN — FUROSEMIDE 40 MG: 10 INJECTION, SOLUTION INTRAMUSCULAR; INTRAVENOUS at 08:52

## 2020-10-13 RX ADMIN — SPIRONOLACTONE 25 MG: 25 TABLET, FILM COATED ORAL at 08:50

## 2020-10-13 RX ADMIN — ASPIRIN 81 MG: 81 TABLET, COATED ORAL at 08:50

## 2020-10-13 RX ADMIN — DICLOFENAC SODIUM 2 G: 10 GEL TOPICAL at 21:19

## 2020-10-13 RX ADMIN — GABAPENTIN 100 MG: 100 CAPSULE ORAL at 08:50

## 2020-10-13 RX ADMIN — GUAIFENESIN 600 MG: 600 TABLET, EXTENDED RELEASE ORAL at 08:50

## 2020-10-13 RX ADMIN — SERTRALINE HYDROCHLORIDE 50 MG: 50 TABLET ORAL at 21:21

## 2020-10-13 RX ADMIN — INSULIN LISPRO 30 UNITS: 100 INJECTION, SOLUTION INTRAVENOUS; SUBCUTANEOUS at 17:34

## 2020-10-13 RX ADMIN — INSULIN LISPRO 30 UNITS: 100 INJECTION, SOLUTION INTRAVENOUS; SUBCUTANEOUS at 08:46

## 2020-10-13 RX ADMIN — GABAPENTIN 100 MG: 100 CAPSULE ORAL at 16:02

## 2020-10-13 RX ADMIN — INSULIN GLARGINE 66 UNITS: 100 INJECTION, SOLUTION SUBCUTANEOUS at 21:20

## 2020-10-13 RX ADMIN — Medication 1 TABLET: at 08:50

## 2020-10-13 RX ADMIN — INSULIN GLARGINE 66 UNITS: 100 INJECTION, SOLUTION SUBCUTANEOUS at 08:45

## 2020-10-13 RX ADMIN — FLUTICASONE FUROATE AND VILANTEROL TRIFENATATE 1 PUFF: 100; 25 POWDER RESPIRATORY (INHALATION) at 08:51

## 2020-10-13 RX ADMIN — DICLOFENAC SODIUM 2 G: 10 GEL TOPICAL at 11:25

## 2020-10-13 RX ADMIN — APIXABAN 5 MG: 5 TABLET, FILM COATED ORAL at 08:50

## 2020-10-13 RX ADMIN — METOPROLOL SUCCINATE 200 MG: 100 TABLET, EXTENDED RELEASE ORAL at 08:50

## 2020-10-13 RX ADMIN — QUETIAPINE FUMARATE 300 MG: 100 TABLET ORAL at 21:21

## 2020-10-13 RX ADMIN — ACETAMINOPHEN 650 MG: 325 TABLET, FILM COATED ORAL at 21:30

## 2020-10-13 RX ADMIN — DICLOFENAC SODIUM 2 G: 10 GEL TOPICAL at 17:36

## 2020-10-13 RX ADMIN — DIGOXIN 250 MCG: 125 TABLET ORAL at 08:50

## 2020-10-13 RX ADMIN — POTASSIUM CHLORIDE 40 MEQ: 1500 TABLET, EXTENDED RELEASE ORAL at 11:23

## 2020-10-13 RX ADMIN — INSULIN LISPRO 1 UNITS: 100 INJECTION, SOLUTION INTRAVENOUS; SUBCUTANEOUS at 12:14

## 2020-10-13 RX ADMIN — ALPRAZOLAM 2 MG: 0.5 TABLET ORAL at 21:21

## 2020-10-13 RX ADMIN — INSULIN LISPRO 1 UNITS: 100 INJECTION, SOLUTION INTRAVENOUS; SUBCUTANEOUS at 08:45

## 2020-10-13 RX ADMIN — QUETIAPINE FUMARATE 200 MG: 200 TABLET, EXTENDED RELEASE ORAL at 08:52

## 2020-10-13 RX ADMIN — DICLOFENAC SODIUM 2 G: 10 GEL TOPICAL at 08:53

## 2020-10-13 RX ADMIN — ATORVASTATIN CALCIUM 40 MG: 40 TABLET, FILM COATED ORAL at 16:01

## 2020-10-13 RX ADMIN — INSULIN LISPRO 2 UNITS: 100 INJECTION, SOLUTION INTRAVENOUS; SUBCUTANEOUS at 21:20

## 2020-10-13 RX ADMIN — FUROSEMIDE 40 MG: 10 INJECTION, SOLUTION INTRAMUSCULAR; INTRAVENOUS at 17:36

## 2020-10-13 RX ADMIN — INSULIN LISPRO 30 UNITS: 100 INJECTION, SOLUTION INTRAVENOUS; SUBCUTANEOUS at 12:14

## 2020-10-13 RX ADMIN — PANTOPRAZOLE SODIUM 40 MG: 40 TABLET, DELAYED RELEASE ORAL at 21:21

## 2020-10-13 RX ADMIN — BUSPIRONE HYDROCHLORIDE 10 MG: 10 TABLET ORAL at 08:50

## 2020-10-13 RX ADMIN — INSULIN LISPRO 1 UNITS: 100 INJECTION, SOLUTION INTRAVENOUS; SUBCUTANEOUS at 17:34

## 2020-10-13 RX ADMIN — TIOTROPIUM BROMIDE 18 MCG: 18 CAPSULE ORAL; RESPIRATORY (INHALATION) at 08:51

## 2020-10-13 RX ADMIN — BUSPIRONE HYDROCHLORIDE 10 MG: 10 TABLET ORAL at 17:36

## 2020-10-13 RX ADMIN — GABAPENTIN 100 MG: 100 CAPSULE ORAL at 20:21

## 2020-10-14 ENCOUNTER — PATIENT OUTREACH (OUTPATIENT)
Dept: CASE MANAGEMENT | Facility: OTHER | Age: 60
End: 2020-10-14

## 2020-10-14 LAB
ANION GAP SERPL CALCULATED.3IONS-SCNC: 8 MMOL/L (ref 4–13)
BUN SERPL-MCNC: 17 MG/DL (ref 5–25)
CALCIUM SERPL-MCNC: 8.7 MG/DL (ref 8.3–10.1)
CHLORIDE SERPL-SCNC: 97 MMOL/L (ref 100–108)
CO2 SERPL-SCNC: 31 MMOL/L (ref 21–32)
CREAT SERPL-MCNC: 1 MG/DL (ref 0.6–1.3)
GFR SERPL CREATININE-BSD FRML MDRD: 81 ML/MIN/1.73SQ M
GLUCOSE SERPL-MCNC: 140 MG/DL (ref 65–140)
GLUCOSE SERPL-MCNC: 147 MG/DL (ref 65–140)
GLUCOSE SERPL-MCNC: 162 MG/DL (ref 65–140)
GLUCOSE SERPL-MCNC: 193 MG/DL (ref 65–140)
GLUCOSE SERPL-MCNC: 209 MG/DL (ref 65–140)
MAGNESIUM SERPL-MCNC: 2.1 MG/DL (ref 1.6–2.6)
POTASSIUM SERPL-SCNC: 3.5 MMOL/L (ref 3.5–5.3)
SODIUM SERPL-SCNC: 136 MMOL/L (ref 136–145)

## 2020-10-14 PROCEDURE — 94640 AIRWAY INHALATION TREATMENT: CPT

## 2020-10-14 PROCEDURE — 99232 SBSQ HOSP IP/OBS MODERATE 35: CPT | Performed by: NURSE PRACTITIONER

## 2020-10-14 PROCEDURE — 83735 ASSAY OF MAGNESIUM: CPT | Performed by: NURSE PRACTITIONER

## 2020-10-14 PROCEDURE — 94660 CPAP INITIATION&MGMT: CPT

## 2020-10-14 PROCEDURE — 82948 REAGENT STRIP/BLOOD GLUCOSE: CPT

## 2020-10-14 PROCEDURE — 94760 N-INVAS EAR/PLS OXIMETRY 1: CPT

## 2020-10-14 PROCEDURE — 99232 SBSQ HOSP IP/OBS MODERATE 35: CPT | Performed by: INTERNAL MEDICINE

## 2020-10-14 PROCEDURE — 97530 THERAPEUTIC ACTIVITIES: CPT

## 2020-10-14 PROCEDURE — 80048 BASIC METABOLIC PNL TOTAL CA: CPT | Performed by: NURSE PRACTITIONER

## 2020-10-14 RX ORDER — TORSEMIDE 20 MG/1
40 TABLET ORAL DAILY
Status: DISCONTINUED | OUTPATIENT
Start: 2020-10-14 | End: 2020-10-15 | Stop reason: HOSPADM

## 2020-10-14 RX ORDER — POTASSIUM CHLORIDE 20 MEQ/1
40 TABLET, EXTENDED RELEASE ORAL 2 TIMES DAILY
Status: COMPLETED | OUTPATIENT
Start: 2020-10-14 | End: 2020-10-14

## 2020-10-14 RX ADMIN — QUETIAPINE FUMARATE 300 MG: 100 TABLET ORAL at 21:43

## 2020-10-14 RX ADMIN — FLUTICASONE FUROATE AND VILANTEROL TRIFENATATE 1 PUFF: 100; 25 POWDER RESPIRATORY (INHALATION) at 09:21

## 2020-10-14 RX ADMIN — APIXABAN 5 MG: 5 TABLET, FILM COATED ORAL at 17:46

## 2020-10-14 RX ADMIN — PANTOPRAZOLE SODIUM 40 MG: 40 TABLET, DELAYED RELEASE ORAL at 21:44

## 2020-10-14 RX ADMIN — QUETIAPINE FUMARATE 200 MG: 200 TABLET, EXTENDED RELEASE ORAL at 09:19

## 2020-10-14 RX ADMIN — GABAPENTIN 100 MG: 100 CAPSULE ORAL at 17:46

## 2020-10-14 RX ADMIN — BUSPIRONE HYDROCHLORIDE 10 MG: 10 TABLET ORAL at 17:46

## 2020-10-14 RX ADMIN — DICLOFENAC SODIUM 2 G: 10 GEL TOPICAL at 12:02

## 2020-10-14 RX ADMIN — INSULIN GLARGINE 66 UNITS: 100 INJECTION, SOLUTION SUBCUTANEOUS at 09:17

## 2020-10-14 RX ADMIN — ASPIRIN 81 MG: 81 TABLET, COATED ORAL at 09:16

## 2020-10-14 RX ADMIN — SERTRALINE HYDROCHLORIDE 50 MG: 50 TABLET ORAL at 21:44

## 2020-10-14 RX ADMIN — POTASSIUM CHLORIDE 40 MEQ: 1500 TABLET, EXTENDED RELEASE ORAL at 17:46

## 2020-10-14 RX ADMIN — DIGOXIN 250 MCG: 125 TABLET ORAL at 09:15

## 2020-10-14 RX ADMIN — SPIRONOLACTONE 25 MG: 25 TABLET, FILM COATED ORAL at 09:18

## 2020-10-14 RX ADMIN — INSULIN LISPRO 30 UNITS: 100 INJECTION, SOLUTION INTRAVENOUS; SUBCUTANEOUS at 17:47

## 2020-10-14 RX ADMIN — TORSEMIDE 40 MG: 20 TABLET ORAL at 09:16

## 2020-10-14 RX ADMIN — GABAPENTIN 100 MG: 100 CAPSULE ORAL at 21:44

## 2020-10-14 RX ADMIN — LEVALBUTEROL HYDROCHLORIDE 0.63 MG: 0.63 SOLUTION RESPIRATORY (INHALATION) at 19:42

## 2020-10-14 RX ADMIN — TIOTROPIUM BROMIDE 18 MCG: 18 CAPSULE ORAL; RESPIRATORY (INHALATION) at 09:20

## 2020-10-14 RX ADMIN — Medication 1 TABLET: at 09:15

## 2020-10-14 RX ADMIN — DICLOFENAC SODIUM 2 G: 10 GEL TOPICAL at 09:21

## 2020-10-14 RX ADMIN — INSULIN LISPRO 1 UNITS: 100 INJECTION, SOLUTION INTRAVENOUS; SUBCUTANEOUS at 21:45

## 2020-10-14 RX ADMIN — INSULIN GLARGINE 66 UNITS: 100 INJECTION, SOLUTION SUBCUTANEOUS at 21:44

## 2020-10-14 RX ADMIN — INSULIN LISPRO 1 UNITS: 100 INJECTION, SOLUTION INTRAVENOUS; SUBCUTANEOUS at 11:58

## 2020-10-14 RX ADMIN — APIXABAN 5 MG: 5 TABLET, FILM COATED ORAL at 09:17

## 2020-10-14 RX ADMIN — ALPRAZOLAM 2 MG: 0.5 TABLET ORAL at 21:43

## 2020-10-14 RX ADMIN — DICLOFENAC SODIUM 2 G: 10 GEL TOPICAL at 21:54

## 2020-10-14 RX ADMIN — BUSPIRONE HYDROCHLORIDE 10 MG: 10 TABLET ORAL at 09:17

## 2020-10-14 RX ADMIN — GABAPENTIN 100 MG: 100 CAPSULE ORAL at 09:18

## 2020-10-14 RX ADMIN — DICLOFENAC SODIUM 2 G: 10 GEL TOPICAL at 17:47

## 2020-10-14 RX ADMIN — METOPROLOL SUCCINATE 200 MG: 100 TABLET, EXTENDED RELEASE ORAL at 09:16

## 2020-10-14 RX ADMIN — POTASSIUM CHLORIDE 40 MEQ: 1500 TABLET, EXTENDED RELEASE ORAL at 09:17

## 2020-10-14 RX ADMIN — GUAIFENESIN 600 MG: 600 TABLET, EXTENDED RELEASE ORAL at 09:16

## 2020-10-14 RX ADMIN — INSULIN LISPRO 30 UNITS: 100 INJECTION, SOLUTION INTRAVENOUS; SUBCUTANEOUS at 12:01

## 2020-10-14 RX ADMIN — ATORVASTATIN CALCIUM 40 MG: 40 TABLET, FILM COATED ORAL at 17:46

## 2020-10-14 RX ADMIN — INSULIN LISPRO 30 UNITS: 100 INJECTION, SOLUTION INTRAVENOUS; SUBCUTANEOUS at 09:31

## 2020-10-14 RX ADMIN — GUAIFENESIN 600 MG: 600 TABLET, EXTENDED RELEASE ORAL at 21:43

## 2020-10-15 VITALS
RESPIRATION RATE: 19 BRPM | BODY MASS INDEX: 40.06 KG/M2 | HEIGHT: 70 IN | WEIGHT: 279.8 LBS | OXYGEN SATURATION: 94 % | SYSTOLIC BLOOD PRESSURE: 136 MMHG | TEMPERATURE: 97.8 F | DIASTOLIC BLOOD PRESSURE: 83 MMHG | HEART RATE: 80 BPM

## 2020-10-15 LAB
ANION GAP SERPL CALCULATED.3IONS-SCNC: 8 MMOL/L (ref 4–13)
BUN SERPL-MCNC: 17 MG/DL (ref 5–25)
CALCIUM SERPL-MCNC: 8.6 MG/DL (ref 8.3–10.1)
CHLORIDE SERPL-SCNC: 100 MMOL/L (ref 100–108)
CHOLEST SERPL-MCNC: 117 MG/DL (ref 50–200)
CO2 SERPL-SCNC: 29 MMOL/L (ref 21–32)
CREAT SERPL-MCNC: 0.96 MG/DL (ref 0.6–1.3)
GFR SERPL CREATININE-BSD FRML MDRD: 85 ML/MIN/1.73SQ M
GLUCOSE SERPL-MCNC: 143 MG/DL (ref 65–140)
GLUCOSE SERPL-MCNC: 155 MG/DL (ref 65–140)
GLUCOSE SERPL-MCNC: 168 MG/DL (ref 65–140)
HDLC SERPL-MCNC: 27 MG/DL
LDLC SERPL CALC-MCNC: 15 MG/DL (ref 0–100)
MAGNESIUM SERPL-MCNC: 2 MG/DL (ref 1.6–2.6)
POTASSIUM SERPL-SCNC: 3.7 MMOL/L (ref 3.5–5.3)
SODIUM SERPL-SCNC: 137 MMOL/L (ref 136–145)
TRIGL SERPL-MCNC: 374 MG/DL
VIT B12 SERPL-MCNC: 502 PG/ML (ref 100–900)

## 2020-10-15 PROCEDURE — 82948 REAGENT STRIP/BLOOD GLUCOSE: CPT

## 2020-10-15 PROCEDURE — 99233 SBSQ HOSP IP/OBS HIGH 50: CPT | Performed by: INTERNAL MEDICINE

## 2020-10-15 PROCEDURE — 82306 VITAMIN D 25 HYDROXY: CPT | Performed by: NURSE PRACTITIONER

## 2020-10-15 PROCEDURE — 82607 VITAMIN B-12: CPT | Performed by: NURSE PRACTITIONER

## 2020-10-15 PROCEDURE — 80048 BASIC METABOLIC PNL TOTAL CA: CPT | Performed by: NURSE PRACTITIONER

## 2020-10-15 PROCEDURE — 83735 ASSAY OF MAGNESIUM: CPT | Performed by: NURSE PRACTITIONER

## 2020-10-15 PROCEDURE — 80061 LIPID PANEL: CPT | Performed by: NURSE PRACTITIONER

## 2020-10-15 PROCEDURE — 99239 HOSP IP/OBS DSCHRG MGMT >30: CPT | Performed by: NURSE PRACTITIONER

## 2020-10-15 PROCEDURE — 94760 N-INVAS EAR/PLS OXIMETRY 1: CPT

## 2020-10-15 RX ORDER — TORSEMIDE 20 MG/1
40 TABLET ORAL DAILY
Qty: 60 TABLET | Refills: 0 | Status: SHIPPED | OUTPATIENT
Start: 2020-10-15 | End: 2021-04-06

## 2020-10-15 RX ORDER — TORSEMIDE 20 MG/1
20 TABLET ORAL DAILY
Qty: 180 TABLET | Refills: 2 | Status: SHIPPED | OUTPATIENT
Start: 2020-10-16 | End: 2021-05-07 | Stop reason: SDUPTHER

## 2020-10-15 RX ORDER — POTASSIUM CHLORIDE 20 MEQ/1
20 TABLET, EXTENDED RELEASE ORAL DAILY
Qty: 30 TABLET | Refills: 0 | Status: SHIPPED | OUTPATIENT
Start: 2020-10-16 | End: 2020-10-26 | Stop reason: SDUPTHER

## 2020-10-15 RX ORDER — INSULIN GLARGINE 100 [IU]/ML
66 INJECTION, SOLUTION SUBCUTANEOUS EVERY 12 HOURS SCHEDULED
Start: 2020-10-15 | End: 2020-12-03 | Stop reason: SDUPTHER

## 2020-10-15 RX ORDER — LOSARTAN POTASSIUM 50 MG/1
50 TABLET ORAL DAILY
Qty: 180 TABLET | Refills: 0
Start: 2020-10-15 | End: 2021-05-07 | Stop reason: SDUPTHER

## 2020-10-15 RX ORDER — MELATONIN
1000 DAILY
Qty: 30 TABLET | Refills: 0 | Status: SHIPPED | OUTPATIENT
Start: 2020-10-15 | End: 2020-10-26 | Stop reason: SDUPTHER

## 2020-10-15 RX ORDER — POTASSIUM CHLORIDE 20 MEQ/1
20 TABLET, EXTENDED RELEASE ORAL DAILY
Status: DISCONTINUED | OUTPATIENT
Start: 2020-10-15 | End: 2020-10-15 | Stop reason: HOSPADM

## 2020-10-15 RX ORDER — ATORVASTATIN CALCIUM 40 MG/1
80 TABLET, FILM COATED ORAL
Qty: 30 TABLET | Refills: 0 | Status: SHIPPED | OUTPATIENT
Start: 2020-10-15 | End: 2020-10-26

## 2020-10-15 RX ADMIN — TIOTROPIUM BROMIDE 18 MCG: 18 CAPSULE ORAL; RESPIRATORY (INHALATION) at 08:34

## 2020-10-15 RX ADMIN — GUAIFENESIN 600 MG: 600 TABLET, EXTENDED RELEASE ORAL at 08:30

## 2020-10-15 RX ADMIN — SPIRONOLACTONE 25 MG: 25 TABLET, FILM COATED ORAL at 08:30

## 2020-10-15 RX ADMIN — INSULIN LISPRO 1 UNITS: 100 INJECTION, SOLUTION INTRAVENOUS; SUBCUTANEOUS at 12:00

## 2020-10-15 RX ADMIN — GABAPENTIN 100 MG: 100 CAPSULE ORAL at 08:30

## 2020-10-15 RX ADMIN — APIXABAN 5 MG: 5 TABLET, FILM COATED ORAL at 08:31

## 2020-10-15 RX ADMIN — ASPIRIN 81 MG: 81 TABLET, COATED ORAL at 08:31

## 2020-10-15 RX ADMIN — METOPROLOL SUCCINATE 200 MG: 100 TABLET, EXTENDED RELEASE ORAL at 08:30

## 2020-10-15 RX ADMIN — DIGOXIN 250 MCG: 125 TABLET ORAL at 08:30

## 2020-10-15 RX ADMIN — INSULIN LISPRO 30 UNITS: 100 INJECTION, SOLUTION INTRAVENOUS; SUBCUTANEOUS at 12:37

## 2020-10-15 RX ADMIN — QUETIAPINE FUMARATE 200 MG: 200 TABLET, EXTENDED RELEASE ORAL at 08:30

## 2020-10-15 RX ADMIN — Medication 1 TABLET: at 08:30

## 2020-10-15 RX ADMIN — INSULIN GLARGINE 66 UNITS: 100 INJECTION, SOLUTION SUBCUTANEOUS at 08:28

## 2020-10-15 RX ADMIN — BUSPIRONE HYDROCHLORIDE 10 MG: 10 TABLET ORAL at 08:30

## 2020-10-15 RX ADMIN — TORSEMIDE 40 MG: 20 TABLET ORAL at 08:30

## 2020-10-15 RX ADMIN — POTASSIUM CHLORIDE 20 MEQ: 1500 TABLET, EXTENDED RELEASE ORAL at 08:38

## 2020-10-15 RX ADMIN — INSULIN LISPRO 30 UNITS: 100 INJECTION, SOLUTION INTRAVENOUS; SUBCUTANEOUS at 08:32

## 2020-10-15 RX ADMIN — DICLOFENAC SODIUM 2 G: 10 GEL TOPICAL at 08:31

## 2020-10-15 RX ADMIN — FLUTICASONE FUROATE AND VILANTEROL TRIFENATATE 1 PUFF: 100; 25 POWDER RESPIRATORY (INHALATION) at 08:31

## 2020-10-15 RX ADMIN — DICLOFENAC SODIUM 2 G: 10 GEL TOPICAL at 12:40

## 2020-10-16 ENCOUNTER — PATIENT OUTREACH (OUTPATIENT)
Dept: CASE MANAGEMENT | Facility: OTHER | Age: 60
End: 2020-10-16

## 2020-10-16 ENCOUNTER — TELEMEDICINE (OUTPATIENT)
Dept: CARDIOLOGY CLINIC | Facility: CLINIC | Age: 60
End: 2020-10-16
Payer: MEDICARE

## 2020-10-16 VITALS
DIASTOLIC BLOOD PRESSURE: 90 MMHG | OXYGEN SATURATION: 99 % | WEIGHT: 280 LBS | BODY MASS INDEX: 40.18 KG/M2 | SYSTOLIC BLOOD PRESSURE: 114 MMHG | HEART RATE: 90 BPM

## 2020-10-16 DIAGNOSIS — J98.4 LUNG DISEASE, RESTRICTIVE: ICD-10-CM

## 2020-10-16 DIAGNOSIS — I50.43 ACUTE ON CHRONIC COMBINED SYSTOLIC AND DIASTOLIC HEART FAILURE (HCC): ICD-10-CM

## 2020-10-16 DIAGNOSIS — G47.33 OSA TREATED WITH BIPAP: Primary | ICD-10-CM

## 2020-10-16 DIAGNOSIS — I48.20 CHRONIC A-FIB (HCC): ICD-10-CM

## 2020-10-16 DIAGNOSIS — I10 BENIGN ESSENTIAL HYPERTENSION: ICD-10-CM

## 2020-10-16 PROCEDURE — 99213 OFFICE O/P EST LOW 20 MIN: CPT | Performed by: INTERNAL MEDICINE

## 2020-10-17 ENCOUNTER — TRANSITIONAL CARE MANAGEMENT (OUTPATIENT)
Dept: FAMILY MEDICINE CLINIC | Facility: CLINIC | Age: 60
End: 2020-10-17

## 2020-10-19 ENCOUNTER — TELEPHONE (OUTPATIENT)
Dept: INPATIENT UNIT | Facility: HOSPITAL | Age: 60
End: 2020-10-19

## 2020-10-19 ENCOUNTER — EPISODE CHANGES (OUTPATIENT)
Dept: CASE MANAGEMENT | Facility: OTHER | Age: 60
End: 2020-10-19

## 2020-10-20 ENCOUNTER — TELEPHONE (OUTPATIENT)
Dept: ENDOCRINOLOGY | Facility: CLINIC | Age: 60
End: 2020-10-20

## 2020-10-20 DIAGNOSIS — E11.65 UNCONTROLLED TYPE 2 DIABETES MELLITUS WITH HYPERGLYCEMIA (HCC): Chronic | ICD-10-CM

## 2020-10-21 ENCOUNTER — OFFICE VISIT (OUTPATIENT)
Dept: FAMILY MEDICINE CLINIC | Facility: CLINIC | Age: 60
End: 2020-10-21
Payer: MEDICARE

## 2020-10-21 VITALS
BODY MASS INDEX: 39.73 KG/M2 | SYSTOLIC BLOOD PRESSURE: 119 MMHG | WEIGHT: 277.5 LBS | HEIGHT: 70 IN | OXYGEN SATURATION: 95 % | DIASTOLIC BLOOD PRESSURE: 79 MMHG | HEART RATE: 82 BPM

## 2020-10-21 DIAGNOSIS — I50.9 CHRONIC CONGESTIVE HEART FAILURE, UNSPECIFIED HEART FAILURE TYPE (HCC): ICD-10-CM

## 2020-10-21 DIAGNOSIS — G47.33 OSA AND COPD OVERLAP SYNDROME (HCC): ICD-10-CM

## 2020-10-21 DIAGNOSIS — J44.9 OSA AND COPD OVERLAP SYNDROME (HCC): ICD-10-CM

## 2020-10-21 DIAGNOSIS — I48.0 PAF (PAROXYSMAL ATRIAL FIBRILLATION) (HCC): ICD-10-CM

## 2020-10-21 DIAGNOSIS — F31.9 BIPOLAR AFFECTIVE DISORDER, REMISSION STATUS UNSPECIFIED (HCC): ICD-10-CM

## 2020-10-21 DIAGNOSIS — E11.65 UNCONTROLLED TYPE 2 DIABETES MELLITUS WITH HYPERGLYCEMIA (HCC): Primary | Chronic | ICD-10-CM

## 2020-10-21 LAB
25(OH)D2 SERPL-MCNC: 7 NG/ML
25(OH)D3 SERPL-MCNC: 39 NG/ML
25(OH)D3+25(OH)D2 SERPL-MCNC: 46 NG/ML

## 2020-10-21 PROCEDURE — 99213 OFFICE O/P EST LOW 20 MIN: CPT | Performed by: FAMILY MEDICINE

## 2020-10-23 ENCOUNTER — OFFICE VISIT (OUTPATIENT)
Dept: PODIATRY | Facility: CLINIC | Age: 60
End: 2020-10-23
Payer: MEDICARE

## 2020-10-23 VITALS
RESPIRATION RATE: 18 BRPM | HEIGHT: 70 IN | BODY MASS INDEX: 39.65 KG/M2 | WEIGHT: 277 LBS | DIASTOLIC BLOOD PRESSURE: 77 MMHG | SYSTOLIC BLOOD PRESSURE: 152 MMHG | HEART RATE: 93 BPM

## 2020-10-23 DIAGNOSIS — L60.0 INGROWN TOENAIL: ICD-10-CM

## 2020-10-23 DIAGNOSIS — E11.42 DIABETIC POLYNEUROPATHY ASSOCIATED WITH TYPE 2 DIABETES MELLITUS (HCC): ICD-10-CM

## 2020-10-23 DIAGNOSIS — E11.8 DIABETIC FOOT (HCC): Primary | ICD-10-CM

## 2020-10-23 DIAGNOSIS — E11.65 UNCONTROLLED TYPE 2 DIABETES MELLITUS WITH HYPERGLYCEMIA (HCC): Chronic | ICD-10-CM

## 2020-10-23 PROCEDURE — 2028F FOOT EXAM PERFORMED: CPT | Performed by: PODIATRIST

## 2020-10-23 PROCEDURE — 99213 OFFICE O/P EST LOW 20 MIN: CPT | Performed by: PODIATRIST

## 2020-10-26 DIAGNOSIS — I25.10 CORONARY ARTERIOSCLEROSIS: ICD-10-CM

## 2020-10-26 DIAGNOSIS — Z86.39 H/O VITAMIN D DEFICIENCY: ICD-10-CM

## 2020-10-26 DIAGNOSIS — I50.43 ACUTE ON CHRONIC COMBINED SYSTOLIC AND DIASTOLIC HEART FAILURE (HCC): ICD-10-CM

## 2020-10-26 DIAGNOSIS — E78.5 HYPERLIPIDEMIA, UNSPECIFIED HYPERLIPIDEMIA TYPE: Primary | ICD-10-CM

## 2020-10-26 RX ORDER — POTASSIUM CHLORIDE 20 MEQ/1
20 TABLET, EXTENDED RELEASE ORAL DAILY
Qty: 90 TABLET | Refills: 3 | Status: SHIPPED | OUTPATIENT
Start: 2020-10-26 | End: 2021-09-03

## 2020-10-26 RX ORDER — MELATONIN
1000 DAILY
Qty: 90 TABLET | Refills: 3 | Status: SHIPPED | OUTPATIENT
Start: 2020-10-26

## 2020-10-26 RX ORDER — ATORVASTATIN CALCIUM 80 MG/1
80 TABLET, FILM COATED ORAL DAILY
Qty: 90 TABLET | Refills: 3 | Status: SHIPPED | OUTPATIENT
Start: 2020-10-26 | End: 2021-09-17

## 2020-10-27 ENCOUNTER — OFFICE VISIT (OUTPATIENT)
Dept: ENDOCRINOLOGY | Facility: CLINIC | Age: 60
End: 2020-10-27
Payer: MEDICARE

## 2020-10-27 ENCOUNTER — TELEPHONE (OUTPATIENT)
Dept: ENDOCRINOLOGY | Facility: CLINIC | Age: 60
End: 2020-10-27

## 2020-10-27 DIAGNOSIS — E78.2 MIXED HYPERLIPIDEMIA: ICD-10-CM

## 2020-10-27 DIAGNOSIS — I10 HYPERTENSION GOAL BP (BLOOD PRESSURE) < 140/90: ICD-10-CM

## 2020-10-27 DIAGNOSIS — Z79.4 TYPE 2 DIABETES MELLITUS WITH HYPERGLYCEMIA, WITH LONG-TERM CURRENT USE OF INSULIN (HCC): Primary | ICD-10-CM

## 2020-10-27 DIAGNOSIS — E11.65 TYPE 2 DIABETES MELLITUS WITH HYPERGLYCEMIA, WITH LONG-TERM CURRENT USE OF INSULIN (HCC): Primary | ICD-10-CM

## 2020-10-27 PROCEDURE — 95251 CONT GLUC MNTR ANALYSIS I&R: CPT | Performed by: INTERNAL MEDICINE

## 2020-10-27 PROCEDURE — 99442 PR PHYS/QHP TELEPHONE EVALUATION 11-20 MIN: CPT | Performed by: INTERNAL MEDICINE

## 2020-10-28 ENCOUNTER — TELEPHONE (OUTPATIENT)
Dept: INPATIENT UNIT | Facility: HOSPITAL | Age: 60
End: 2020-10-28

## 2020-10-29 PROBLEM — D72.829 LEUKOCYTOSIS: Status: ACTIVE | Noted: 2020-10-29

## 2020-10-29 PROBLEM — D53.9 NUTRITIONAL ANEMIA, UNSPECIFIED: Status: ACTIVE | Noted: 2020-10-29

## 2020-10-29 PROBLEM — E78.5 HYPERLIPIDEMIA: Status: ACTIVE | Noted: 2020-10-29

## 2020-11-03 ENCOUNTER — PATIENT OUTREACH (OUTPATIENT)
Dept: CASE MANAGEMENT | Facility: OTHER | Age: 60
End: 2020-11-03

## 2020-11-03 DIAGNOSIS — K21.9 GASTROESOPHAGEAL REFLUX DISEASE: ICD-10-CM

## 2020-11-03 RX ORDER — OMEPRAZOLE 20 MG/1
CAPSULE, DELAYED RELEASE ORAL
Qty: 30 CAPSULE | Refills: 10 | Status: SHIPPED | OUTPATIENT
Start: 2020-11-03 | End: 2021-08-06 | Stop reason: SDUPTHER

## 2020-11-05 ENCOUNTER — PATIENT OUTREACH (OUTPATIENT)
Dept: CASE MANAGEMENT | Facility: OTHER | Age: 60
End: 2020-11-05

## 2020-11-05 NOTE — OP NOTE
OPERATIVE REPORT  PATIENT NAME: Anderson Toribio    :  1960  MRN: [id number]  Pt Location: [place] [address] GI ROOM 01    SURGERY DATE: 10/10/2018    Surgeon(s) and Role:     * Danae Rodriguez MD - Primary    Preop Diagnosis:  Gastroparesis [K31 84]  Other gastritis without bleeding [K29 60]    Post-Op Diagnosis Codes:     * Gastroparesis [K31 84]     * Other gastritis without bleeding [K29 60]     * Hiatal hernia [K44 9]    Procedure(s) (LRB):  ESOPHAGOGASTRODUODENOSCOPY (EGD) (N/A) with biopsies     Specimen(s):  ID Type Source Tests Collected by Time Destination   1 : antral bxs Tissue Stomach TISSUE EXAM Danae Rodriguez MD 10/10/2018 0912    2 : bxs lower esophagus- r/o Barretts Tissue Esophagus TISSUE EXAM Danae Rodriguez MD 10/10/2018 09        Estimated Blood Loss:   Minimal         Anesthesia Type:   IV Sedation with Anesthesia    Operative Indications:  Gastroparesis [K31 84]  Other gastritis without bleeding [K29 60]      Operative Findings:  EGD-under conscious sedation, upper endoscope was passed through the mouth and upper esophageal sphincter was intubated  Upper, middle and lower esophagus appeared normal   Multiple lower esophageal biopsies were done to rule out Demarco esophagus, GE junction, there is a small hiatal hernia  Scope was advanced in the stomach on retroflexion fundus appeared normal, gastric body and antrum appeared normal   Pyloric channel appeared wide open  Multiple antral biopsies were done  Scope was advanced from pylorus to duodenal bulb which appeared normal, 2nd part of duodenum where duodenal villi appeared normal    Impression-1  History of gastroparesis  2  Gastroesophageal reflux disease with mild gastritis  3  Small hiatal hernia    Recommendation-1  Tight glycemic control  2  Follow-up biopsies  3   Continue current medication    Complications:   None      Patient Disposition:  PACU     SIGNATURE: Danae Rodriguez MD  DATE: October 10, 2018  TIME: 9:24 AM home

## 2020-11-11 ENCOUNTER — PATIENT OUTREACH (OUTPATIENT)
Dept: CASE MANAGEMENT | Facility: OTHER | Age: 60
End: 2020-11-11

## 2020-11-17 PROBLEM — I50.9 CHRONIC CONGESTIVE HEART FAILURE (HCC): Status: ACTIVE | Noted: 2020-11-17

## 2020-11-25 ENCOUNTER — TELEMEDICINE (OUTPATIENT)
Dept: CARDIOLOGY CLINIC | Facility: CLINIC | Age: 60
End: 2020-11-25
Payer: MEDICARE

## 2020-11-25 VITALS
DIASTOLIC BLOOD PRESSURE: 75 MMHG | WEIGHT: 275 LBS | HEART RATE: 85 BPM | SYSTOLIC BLOOD PRESSURE: 101 MMHG | OXYGEN SATURATION: 95 % | BODY MASS INDEX: 39.46 KG/M2

## 2020-11-25 DIAGNOSIS — I25.10 CORONARY ARTERY DISEASE INVOLVING NATIVE HEART WITHOUT ANGINA PECTORIS, UNSPECIFIED VESSEL OR LESION TYPE: ICD-10-CM

## 2020-11-25 DIAGNOSIS — I10 BENIGN ESSENTIAL HYPERTENSION: ICD-10-CM

## 2020-11-25 DIAGNOSIS — E78.5 DYSLIPIDEMIA: ICD-10-CM

## 2020-11-25 DIAGNOSIS — J98.4 LUNG DISEASE, RESTRICTIVE: ICD-10-CM

## 2020-11-25 DIAGNOSIS — I50.32 CHRONIC DIASTOLIC CONGESTIVE HEART FAILURE (HCC): ICD-10-CM

## 2020-11-25 DIAGNOSIS — I48.20 CHRONIC A-FIB (HCC): Primary | ICD-10-CM

## 2020-11-25 PROCEDURE — 99213 OFFICE O/P EST LOW 20 MIN: CPT | Performed by: INTERNAL MEDICINE

## 2020-12-03 ENCOUNTER — OFFICE VISIT (OUTPATIENT)
Dept: PULMONOLOGY | Facility: MEDICAL CENTER | Age: 60
End: 2020-12-03
Payer: MEDICARE

## 2020-12-03 VITALS
BODY MASS INDEX: 39.94 KG/M2 | HEIGHT: 70 IN | SYSTOLIC BLOOD PRESSURE: 110 MMHG | TEMPERATURE: 97.3 F | DIASTOLIC BLOOD PRESSURE: 80 MMHG | RESPIRATION RATE: 14 BRPM | HEART RATE: 85 BPM | OXYGEN SATURATION: 98 % | WEIGHT: 279 LBS

## 2020-12-03 DIAGNOSIS — Z79.4 TYPE 2 DIABETES MELLITUS WITH HYPERGLYCEMIA, WITH LONG-TERM CURRENT USE OF INSULIN (HCC): Primary | ICD-10-CM

## 2020-12-03 DIAGNOSIS — J96.11 CHRONIC RESPIRATORY FAILURE WITH HYPOXIA (HCC): ICD-10-CM

## 2020-12-03 DIAGNOSIS — E66.2 CLASS 3 OBESITY WITH ALVEOLAR HYPOVENTILATION, SERIOUS COMORBIDITY, AND BODY MASS INDEX (BMI) OF 40.0 TO 44.9 IN ADULT (HCC): ICD-10-CM

## 2020-12-03 DIAGNOSIS — E11.42 DIABETIC POLYNEUROPATHY ASSOCIATED WITH TYPE 2 DIABETES MELLITUS (HCC): ICD-10-CM

## 2020-12-03 DIAGNOSIS — E11.65 TYPE 2 DIABETES MELLITUS WITH HYPERGLYCEMIA, WITH LONG-TERM CURRENT USE OF INSULIN (HCC): Primary | ICD-10-CM

## 2020-12-03 DIAGNOSIS — E11.8 TYPE 2 DIABETES MELLITUS WITH COMPLICATION, WITH LONG-TERM CURRENT USE OF INSULIN (HCC): ICD-10-CM

## 2020-12-03 DIAGNOSIS — G47.33 OSA AND COPD OVERLAP SYNDROME (HCC): Primary | ICD-10-CM

## 2020-12-03 DIAGNOSIS — J44.9 OSA AND COPD OVERLAP SYNDROME (HCC): Primary | ICD-10-CM

## 2020-12-03 DIAGNOSIS — Z79.4 TYPE 2 DIABETES MELLITUS WITH COMPLICATION, WITH LONG-TERM CURRENT USE OF INSULIN (HCC): ICD-10-CM

## 2020-12-03 DIAGNOSIS — E11.65 UNCONTROLLED TYPE 2 DIABETES MELLITUS WITH HYPERGLYCEMIA (HCC): ICD-10-CM

## 2020-12-03 PROCEDURE — 99214 OFFICE O/P EST MOD 30 MIN: CPT | Performed by: INTERNAL MEDICINE

## 2020-12-03 RX ORDER — METFORMIN HYDROCHLORIDE 500 MG/1
TABLET, EXTENDED RELEASE ORAL
Qty: 90 TABLET | Refills: 0 | Status: SHIPPED | OUTPATIENT
Start: 2020-12-03 | End: 2021-03-01

## 2020-12-03 RX ORDER — INSULIN GLARGINE 100 [IU]/ML
INJECTION, SOLUTION SUBCUTANEOUS
Qty: 105 ML | Refills: 1 | Status: SHIPPED | OUTPATIENT
Start: 2020-12-03 | End: 2020-12-09 | Stop reason: SDUPTHER

## 2020-12-07 ENCOUNTER — PATIENT OUTREACH (OUTPATIENT)
Dept: CASE MANAGEMENT | Facility: OTHER | Age: 60
End: 2020-12-07

## 2020-12-09 ENCOUNTER — TELEPHONE (OUTPATIENT)
Dept: ENDOCRINOLOGY | Facility: CLINIC | Age: 60
End: 2020-12-09

## 2020-12-09 DIAGNOSIS — E11.65 TYPE 2 DIABETES MELLITUS WITH HYPERGLYCEMIA, WITH LONG-TERM CURRENT USE OF INSULIN (HCC): ICD-10-CM

## 2020-12-09 DIAGNOSIS — Z79.4 TYPE 2 DIABETES MELLITUS WITH HYPERGLYCEMIA, WITH LONG-TERM CURRENT USE OF INSULIN (HCC): ICD-10-CM

## 2020-12-09 RX ORDER — INSULIN GLARGINE 100 [IU]/ML
INJECTION, SOLUTION SUBCUTANEOUS
Qty: 105 ML | Refills: 3 | Status: SHIPPED | OUTPATIENT
Start: 2020-12-09 | End: 2021-05-24

## 2020-12-11 NOTE — PROGRESS NOTES
Progress Note - Pulmonary   Kait Kaia 60 y o  male MRN: [id number]  Unit/Bed#: 3 [place] [address] Encounter: [id number]      Assessment/Plan:       Chronic bronchitis with acute exacerbation (HCC)  Assessment & Plan  -pt w/ hx chronic cough and chronic bronchitis  -pt on trelegy triple therapy chronically   -on ATC nebs available at home  -continue steroids, wean to Q 12 today  -continue 4 times daily Xopenex Atrovent    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 [facility])  Assessment & Plan  -in patient with frequent recent hospitalizations namely for respiratory issues  -alveolar hypoventilation likely is contributory  -on BiPAP therapy here in hospital 12/5 40% patient maintains ABG results as listed below  -plan is to transition patient from CPAP therapy to BiPAP  Patient has a dream station which can be adjusted to this type of therapy through [employer] [address]  equipment   -patient will need 2 L bled into his BiPAP at night      Alonzo Humphries has chronic respiratory failure due to COPD/and Alveolar Hypoventilation and will need BIPAP  ABG results ph 7 41 PCO2 46 4  Patient in the past has only utilized CPAP which is not alleviating his chronic hypercarbic respiratory failure  BiPAP is needed to increase his efficiency for work of breathing  Without this respiratory support, serious harm or death could occur  BiPAP would decrease in hospital readmission rate and improve overall quality of life        Esophageal reflux  Assessment & Plan  -continue PPI tx    SHAVONNE and COPD overlap syndrome ([facility] )  Assessment & Plan  -patient is both chronically hypoxemic and on CPAP at home requiring 2 L nasal cannula oxygen / and is chronically on 10 cm of water CPAP  -patient also has chronic emphysematous disease  -w/ history of Severe restriction on spirometry w/ ratios of 70-72%  -FVC 41%  -FEV1 39%    * Acute on chronic respiratory failure with hypoxia (HCC)  Assessment & Plan  -chronically on 2 L NC at home  -clinically improved and returned to baseline  -likely 2/2 COPD exacerabation  -patient will also need to utilize 2 L oxygen with BiPAP at night          ______________________________________________________________________    Subjective: Pt seen and examined at bedside  Patient is feeling better today  Less dyspneic  Less cough  Tele Events:     Vitals:   Temp:  [97 2 °F (36 2 °C)-98 4 °F (36 9 °C)] 97 6 °F (36 4 °C)  HR:  [] 84  Resp:  [20-22] 20  BP: (124-165)/(76-95) 132/95  Weight (last 2 days)     Date/Time   Weight    03/25/19 0600   131 (289 31)    03/24/19 0554   134 (295 2)    03/23/19 0600   133 (292 55)            Oxygen Therapy  SpO2: 97 %  O2 Flow Rate (L/min): 2 L/min    IV Infusions:       Nutrition:        Diet Orders   (From admission, onward)            Start     Ordered    03/22/19 1226  Room Service  Once     Question:  Type of Service  Answer:  Room Service-Appropriate    03/22/19 1225    03/20/19 1945  Diet Campos/CHO Controlled; Consistent Carbohydrate Diet Level 2 (5 carb servings/75 grams CHO/meal)  Diet effective now     Question Answer Comment   Diet Type Campos/CHO Controlled    Campos/CHO Controlled Consistent Carbohydrate Diet Level 2 (5 carb servings/75 grams CHO/meal)    RD to adjust diet per protocol? No        03/20/19 1944          Ins/Outs:   I/O       03/23 0701 - 03/24 0700 03/24 0701 - 03/25 0700 03/25 0701 - 03/26 0700    P  O  1425 2930     I V  (mL/kg)  10 (0 1)     Total Intake(mL/kg) 1425 (10 6) 2940 (22 4)     Urine (mL/kg/hr) 5600 (1 7) 5875 (1 9)     Total Output 5600 5875     Net -4179 -0132                  Lines/Drains:  Invasive Devices     Peripheral Intravenous Line            Peripheral IV 03/24/19 Left;Upper Arm less than 1 day                 Active medications:  Scheduled Meds:  Current Facility-Administered Medications:  ALPRAZolam 2 mg Oral HS PRN Eliana Issa, BETTIENP   apixaban 5 mg Oral BID BETTIE EscalanteNP   aspirin 81 mg Oral QAM Regi Cook, CRNP   azithromycin 500 mg Oral Q24H Regi Cook, CRNP   benzonatate 200 mg Oral TID PRN Regi Cook, CRNP   busPIRone 15 mg Oral BID Regi Cook, CRNP   diltiazem 30 mg Oral [facility] Francois Brand MD   fenofibrate 160 mg Oral Daily Regi Cook, CRNP   insulin glargine 50 Units Subcutaneous HS Ivelisse Adams MD   insulin lispro 4-20 Units Subcutaneous 4x Daily (AC & HS) Regi Cook, CRNP   insulin lispro 8 Units Subcutaneous TID With Meals Ivelisse Adams MD   ipratropium 0 5 mg Nebulization 4x Daily Regi Cook, MANAS   And       levalbuterol 1 25 mg Nebulization 4x Daily Regi Cook, CRJALIL   methocarbamol 500 mg Oral HS PRN Regi Cook, CRNP   methylPREDNISolone sodium succinate 20 mg Intravenous [facility] Ivelisse Adams MD   metoprolol 5 mg Intravenous Q6H PRN Regi Cook, CRNP   metoprolol tartrate 100 mg Oral Q12H [address] Francois Brand MD   pantoprazole 40 mg Oral Early Morning Regi Cook, CRNP   pregabalin 50 mg Oral TID Regi Cook, CRNP   QUEtiapine 400 mg Oral HS Regi Cook, CRNP   venlafaxine 150 mg Oral QAM Regi Cook, CRNP     PRN Meds:  ALPRAZolam 2 mg HS PRN   benzonatate 200 mg TID PRN   methocarbamol 500 mg HS PRN   metoprolol 5 mg Q6H PRN     ____________________________________________________________________      Physical Exam   Constitutional: He is oriented to person, place, and time  He appears well-developed  Obese body habitus   HENT:   Head: Normocephalic and atraumatic  Eyes: Pupils are equal, round, and reactive to light  Conjunctivae are normal    Neck: Normal range of motion  Neck supple  Cardiovascular: Normal rate, regular rhythm and normal heart sounds  Exam reveals no gallop and no friction rub  No murmur heard  Pulmonary/Chest: Effort normal  No accessory muscle usage  No tachypnea  No respiratory distress   He has decreased breath sounds in the right middle field, the right lower field, the left middle field and the left lower field  He has no wheezes  He has no rhonchi  He has no rales  He exhibits no tenderness  Patient currently on 2 L nasal cannula   Abdominal: Soft  Bowel sounds are normal    Protuberant and obese abdomen   Musculoskeletal: Normal range of motion  He exhibits no edema  Neurological: He is alert and oriented to person, place, and time  Skin: Skin is warm and dry  Psychiatric: He has a normal mood and affect            ____________________________________________________________________    Labs:   CBC: Results from last 7 days   Lab Units 03/24/19  0551 03/22/19  0540 03/21/19  0550   WBC Thousand/uL 12 39* 13 10* 11 27*   HEMOGLOBIN g/dL 14 6 14 1 14 1   HEMATOCRIT % 44 5 43 7 43 9   MCV fL 91 92 93   PLATELETS Thousands/uL 195 202 199     CMP: Results from last 7 days   Lab Units 03/24/19  0551 03/22/19  0540 03/21/19  0550 03/20/19  1445   POTASSIUM mmol/L 4 4 4 5 4 6 4 0   CHLORIDE mmol/L 99* 98* 102 101   CO2 mmol/L 33* 29 26 29   BUN mg/dL 25 28* 29* 18   CREATININE mg/dL 1 00 1 09 1 02 1 03   CALCIUM mg/dL 9 4 9 7 9 4 9 4   AST U/L  --  11  --  14   ALT U/L  --  32  --  35   ALK PHOS U/L  --  48  --  72   EGFR ml/min/1 73sq m 82 74 80 79     No components found for: ABG    Magnesium:   Results from last 7 days   Lab Units 03/22/19  0540   MAGNESIUM mg/dL 2 1     Phosphorous:   Results from last 7 days   Lab Units 03/21/19  0550   PHOSPHORUS mg/dL 3 5     Troponin:   Results from last 7 days   Lab Units 03/20/19 2049 03/20/19  1445   TROPONIN I ng/mL 0 02 0 03     PT/INR:     Lactic Acid:     BNP:   Results from last 7 days   Lab Units 03/20/19  1445   NT-PRO BNP pg/mL 855*     TSH:     Procalcitonin: Invalid input(s): PROCALCITONIN      Imaging:   XR chest portable- ICU   Final Result by Rohan Bowen MD (03/21 1014)      Enlargement of cardiac silhouette    Somewhat prominent pulmonary vasculature, correlate for clinical concern of pulmonary congestion      No acute focal infiltrate is seen            Workstation performed: [id number]         XR chest 1 view portable   Final Result by Rosy Nogueira MD (03/20 1513)      No acute cardiopulmonary disease  Workstation performed: [id number]               Micro: Lab Results   Component Value Date    BLOODCX No Growth After 5 Days  11/16/2018    BLOODCX Staphylococcus coagulase negative (A) 11/16/2018    BLOODCX No Growth After 5 Days  10/31/2018    URINECX 4565-3605 cfu/ml Mixed Contaminants X2 03/20/2017    URINECX No Growth <1000 cfu/mL 11/27/2016    URINECX No Growth <1000 cfu/mL 09/02/2016    SPUTUMCULTUR Test not performed  Suggest repeat specimen  03/21/2019    SPUTUMCULTUR Test not performed  Suggest repeat specimen   11/08/2017    SPUTUMCULTUR 1+ Growth of Staphylococcus aureus 03/20/2017    SPUTUMCULTUR 1+ Growth of Mixed Respiratory Francine 03/20/2017    MRSACULTURE  03/20/2019     No Methicillin Resistant Staphlyococcus aureus (MRSA) isolated            Invalid input(s): LEGIONELLAURINARYANTIGEN        Code Status: Level 1 - Full Code home care

## 2020-12-22 ENCOUNTER — PATIENT OUTREACH (OUTPATIENT)
Dept: CASE MANAGEMENT | Facility: OTHER | Age: 60
End: 2020-12-22

## 2020-12-29 ENCOUNTER — TELEPHONE (OUTPATIENT)
Dept: ENDOCRINOLOGY | Facility: CLINIC | Age: 60
End: 2020-12-29

## 2020-12-29 ENCOUNTER — OFFICE VISIT (OUTPATIENT)
Dept: ENDOCRINOLOGY | Facility: CLINIC | Age: 60
End: 2020-12-29
Payer: MEDICARE

## 2020-12-29 DIAGNOSIS — E11.65 TYPE 2 DIABETES MELLITUS WITH HYPERGLYCEMIA, WITH LONG-TERM CURRENT USE OF INSULIN (HCC): Primary | ICD-10-CM

## 2020-12-29 DIAGNOSIS — I10 HYPERTENSION GOAL BP (BLOOD PRESSURE) < 140/90: ICD-10-CM

## 2020-12-29 DIAGNOSIS — Z79.4 TYPE 2 DIABETES MELLITUS WITH HYPERGLYCEMIA, WITH LONG-TERM CURRENT USE OF INSULIN (HCC): Primary | ICD-10-CM

## 2020-12-29 DIAGNOSIS — E78.2 MIXED HYPERLIPIDEMIA: ICD-10-CM

## 2020-12-29 DIAGNOSIS — E55.9 VITAMIN D DEFICIENCY: ICD-10-CM

## 2020-12-29 DIAGNOSIS — E53.8 VITAMIN B 12 DEFICIENCY: ICD-10-CM

## 2020-12-29 PROCEDURE — 99442 PR PHYS/QHP TELEPHONE EVALUATION 11-20 MIN: CPT | Performed by: INTERNAL MEDICINE

## 2020-12-29 PROCEDURE — 95251 CONT GLUC MNTR ANALYSIS I&R: CPT | Performed by: INTERNAL MEDICINE

## 2021-01-12 LAB — HBA1C MFR BLD HPLC: 8.1 %

## 2021-01-13 ENCOUNTER — PATIENT OUTREACH (OUTPATIENT)
Dept: CASE MANAGEMENT | Facility: OTHER | Age: 61
End: 2021-01-13

## 2021-01-19 ENCOUNTER — TELEPHONE (OUTPATIENT)
Dept: ENDOCRINOLOGY | Facility: CLINIC | Age: 61
End: 2021-01-19

## 2021-01-19 NOTE — TELEPHONE ENCOUNTER
Please let pt know his labs     AST, ALT liver enzymes mildly elevated 48, 55- see GI for further evaluation    Triglycerides are high 567 - is he taking his statin and Vascepa?  If so please follow up with cardiologist for further management, may need fibrate as well    A1C 8 1%    Vitamin D level mildly low 23- increase to D3 2000 IU daily

## 2021-01-19 NOTE — TELEPHONE ENCOUNTER
----- Message from Francisco Javier Herrera MA sent at 1/18/2021  8:47 AM EST -----  Hawk Champion,    Lab results in media    ----- Message -----  From: Kerry Fisher  Sent: 1/18/2021   8:42 AM EST  To: , #

## 2021-01-19 NOTE — TELEPHONE ENCOUNTER
Please obtain lab results from Bertin Boboemiah, I cannot read some of the lab values on this scanned copy

## 2021-01-20 ENCOUNTER — TELEPHONE (OUTPATIENT)
Dept: CARDIOLOGY CLINIC | Facility: CLINIC | Age: 61
End: 2021-01-20

## 2021-01-20 NOTE — TELEPHONE ENCOUNTER
Patient reports losing weight - diabetes is improving  He is aware that will be discussed further next week

## 2021-01-20 NOTE — TELEPHONE ENCOUNTER
Triglycerides 567   Taking vascepa and statin 80 mg qd at this time,   Referred by maritza for cardiac management of triglycerides  Please advise

## 2021-01-20 NOTE — TELEPHONE ENCOUNTER
Spoke to patient, went over lab results  He understood  He stated that he will text BG in the am when he wakes up and the numbers are between 160-170, he will not eat breakfast  He will normally drink water and diet pepsi instead having breakfast    He will test again before eats lunch (between 11 am - 12 pm) and BG is 300 or over  He will upload Genie Slovenian report in a day or so, once he changes sensor  He is currently  taking D3 2000 IU

## 2021-01-20 NOTE — TELEPHONE ENCOUNTER
Please make sure his is taking his Vascepa 2 tablets twice a day  He needs to make sure he does a better job with his diabetes and to try to lose weight  Tryglycerides will be high if sugars are high    Can add an additional medication if needed - will talk more next week

## 2021-01-21 ENCOUNTER — TELEPHONE (OUTPATIENT)
Dept: FAMILY MEDICINE CLINIC | Facility: CLINIC | Age: 61
End: 2021-01-21

## 2021-01-21 ENCOUNTER — TELEPHONE (OUTPATIENT)
Dept: ENDOCRINOLOGY | Facility: CLINIC | Age: 61
End: 2021-01-21

## 2021-01-21 NOTE — TELEPHONE ENCOUNTER
Please let patient know I reviewed Rafael report    Blood sugars stable continue same regimen for now    Had isolated days where blood sugars are high during the daytime, unclear why but they got better the following days    Send another Rafael report in 4 weeks

## 2021-01-21 NOTE — TELEPHONE ENCOUNTER
Rcv'd call from [place] [address] regarding pts respiratory meds  They sent last fax on 1/19 and pt keeps calling them asking to expedite the order  Have you received fax?   [phone]

## 2021-01-22 ENCOUNTER — TELEPHONE (OUTPATIENT)
Dept: GASTROENTEROLOGY | Facility: CLINIC | Age: 61
End: 2021-01-22

## 2021-01-22 NOTE — TELEPHONE ENCOUNTER
----- Message from Liliana Iverson sent at 1/20/2021 10:48 AM EST -----  Regarding: schedule appointment  Please schedule patient for appointment with Dr Marcy Parekh he was informed by his endocrinologist that he has elevated liver enzymes and needs to see GI  Patient reports he has seen us in the past but it has been awhile  Thank you

## 2021-01-27 ENCOUNTER — TELEPHONE (OUTPATIENT)
Dept: FAMILY MEDICINE CLINIC | Facility: CLINIC | Age: 61
End: 2021-01-27

## 2021-01-27 DIAGNOSIS — E78.5 DYSLIPIDEMIA: ICD-10-CM

## 2021-01-27 NOTE — TELEPHONE ENCOUNTER
Called patient regarding his message to Dr Rodri Christy  Advised patient he is not on the schedule for this Friday or any upcoming appointment  Patient will see when his nurse will be available to take vitals and call us back to schedule virtual follow up appointment

## 2021-01-28 ENCOUNTER — TELEMEDICINE (OUTPATIENT)
Dept: CARDIOLOGY CLINIC | Facility: CLINIC | Age: 61
End: 2021-01-28
Payer: MEDICARE

## 2021-01-28 VITALS
HEIGHT: 64 IN | RESPIRATION RATE: 18 BRPM | SYSTOLIC BLOOD PRESSURE: 123 MMHG | BODY MASS INDEX: 47.89 KG/M2 | WEIGHT: 280.5 LBS | OXYGEN SATURATION: 95 % | HEART RATE: 93 BPM | DIASTOLIC BLOOD PRESSURE: 84 MMHG

## 2021-01-28 DIAGNOSIS — I10 BENIGN ESSENTIAL HYPERTENSION: ICD-10-CM

## 2021-01-28 DIAGNOSIS — I25.10 CORONARY ARTERY DISEASE INVOLVING NATIVE HEART WITHOUT ANGINA PECTORIS, UNSPECIFIED VESSEL OR LESION TYPE: ICD-10-CM

## 2021-01-28 DIAGNOSIS — I48.20 CHRONIC A-FIB (HCC): ICD-10-CM

## 2021-01-28 DIAGNOSIS — E78.5 HYPERLIPIDEMIA, UNSPECIFIED HYPERLIPIDEMIA TYPE: ICD-10-CM

## 2021-01-28 DIAGNOSIS — I50.32 CHRONIC DIASTOLIC CONGESTIVE HEART FAILURE (HCC): Primary | ICD-10-CM

## 2021-01-28 DIAGNOSIS — G47.33 OSA AND COPD OVERLAP SYNDROME (HCC): ICD-10-CM

## 2021-01-28 DIAGNOSIS — E78.5 DYSLIPIDEMIA: ICD-10-CM

## 2021-01-28 DIAGNOSIS — J44.9 OSA AND COPD OVERLAP SYNDROME (HCC): ICD-10-CM

## 2021-01-28 DIAGNOSIS — J98.4 LUNG DISEASE, RESTRICTIVE: ICD-10-CM

## 2021-01-28 PROCEDURE — 99214 OFFICE O/P EST MOD 30 MIN: CPT | Performed by: INTERNAL MEDICINE
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
## 2021-01-29 RX ORDER — ICOSAPENT ETHYL 1000 MG/1
CAPSULE ORAL
Qty: 120 CAPSULE | Refills: 3 | Status: SHIPPED | OUTPATIENT
Start: 2021-01-29 | End: 2021-05-07 | Stop reason: SDUPTHER

## 2021-02-08 ENCOUNTER — TELEPHONE (OUTPATIENT)
Dept: ENDOCRINOLOGY | Facility: CLINIC | Age: 61
End: 2021-02-08

## 2021-02-08 NOTE — TELEPHONE ENCOUNTER
completed DWO for kamran sensors and faxed along with clinical notes to [facility] @ [phone]

## 2021-02-09 ENCOUNTER — HOSPITAL ENCOUNTER (OUTPATIENT)
Dept: NON INVASIVE DIAGNOSTICS | Facility: HOSPITAL | Age: 61
Discharge: HOME | End: 2021-02-09
Attending: INTERNAL MEDICINE

## 2021-02-17 ENCOUNTER — TELEMEDICINE (OUTPATIENT)
Dept: FAMILY MEDICINE CLINIC | Facility: CLINIC | Age: 61
End: 2021-02-17

## 2021-02-17 VITALS
HEART RATE: 85 BPM | SYSTOLIC BLOOD PRESSURE: 120 MMHG | OXYGEN SATURATION: 98 % | BODY MASS INDEX: 46.95 KG/M2 | WEIGHT: 275 LBS | HEIGHT: 64 IN | DIASTOLIC BLOOD PRESSURE: 80 MMHG

## 2021-02-17 DIAGNOSIS — K21.9 GASTROESOPHAGEAL REFLUX DISEASE WITHOUT ESOPHAGITIS: ICD-10-CM

## 2021-02-17 DIAGNOSIS — E78.5 HYPERLIPIDEMIA, UNSPECIFIED HYPERLIPIDEMIA TYPE: ICD-10-CM

## 2021-02-17 DIAGNOSIS — F31.9 BIPOLAR AFFECTIVE DISORDER, REMISSION STATUS UNSPECIFIED (HCC): ICD-10-CM

## 2021-02-17 DIAGNOSIS — J44.9 CHRONIC OBSTRUCTIVE PULMONARY DISEASE, UNSPECIFIED COPD TYPE (HCC): ICD-10-CM

## 2021-02-17 DIAGNOSIS — E11.42 DIABETIC POLYNEUROPATHY ASSOCIATED WITH TYPE 2 DIABETES MELLITUS (HCC): ICD-10-CM

## 2021-02-17 DIAGNOSIS — I48.0 PAF (PAROXYSMAL ATRIAL FIBRILLATION) (HCC): ICD-10-CM

## 2021-02-17 DIAGNOSIS — Z00.00 MEDICARE ANNUAL WELLNESS VISIT, SUBSEQUENT: Primary | ICD-10-CM

## 2021-02-17 DIAGNOSIS — I10 BENIGN ESSENTIAL HYPERTENSION: ICD-10-CM

## 2021-02-17 DIAGNOSIS — I50.32 CHRONIC DIASTOLIC CONGESTIVE HEART FAILURE (HCC): ICD-10-CM

## 2021-02-17 DIAGNOSIS — I25.10 CORONARY ARTERY DISEASE INVOLVING NATIVE HEART WITHOUT ANGINA PECTORIS, UNSPECIFIED VESSEL OR LESION TYPE: ICD-10-CM

## 2021-02-17 PROCEDURE — 99214 OFFICE O/P EST MOD 30 MIN: CPT | Performed by: FAMILY MEDICINE

## 2021-02-17 PROCEDURE — G0438 PPPS, INITIAL VISIT: HCPCS | Performed by: FAMILY MEDICINE

## 2021-02-17 RX ORDER — BARIUM SULFATE 1 MG/ML
SUSPENSION ORAL ONCE
COMMUNITY
End: 2021-04-06

## 2021-02-17 NOTE — PATIENT INSTRUCTIONS
Medicare Preventive Visit Patient Instructions  Thank you for completing your Welcome to Medicare Visit or Medicare Annual Wellness Visit today  Your next wellness visit will be due in one year (2/17/2022)  The screening/preventive services that you may require over the next 5-10 years are detailed below  Some tests may not apply to you based off risk factors and/or age  Screening tests ordered at today's visit but not completed yet may show as past due  Also, please note that scanned in results may not display below  Preventive Screenings:  Service Recommendations Previous Testing/Comments   Colorectal Cancer Screening  · Colonoscopy    · Fecal Occult Blood Test (FOBT)/Fecal Immunochemical Test (FIT)  · Fecal DNA/Cologuard Test  · Flexible Sigmoidoscopy Age: 54-65 years old   Colonoscopy: every 10 years (May be performed more frequently if at higher risk)  OR  FOBT/FIT: every 1 year  OR  Cologuard: every 3 years  OR  Sigmoidoscopy: every 5 years  Screening may be recommended earlier than age 48 if at higher risk for colorectal cancer  Also, an individualized decision between you and your healthcare provider will decide whether screening between the ages of 74-80 would be appropriate   Colonoscopy: 09/11/2017  FOBT/FIT: Not on file  Cologuard: Not on file  Sigmoidoscopy: Not on file    Screening Current     Prostate Cancer Screening Individualized decision between patient and health care provider in men between ages of 53-78   Medicare will cover every 12 months beginning on the day after your 50th birthday PSA: No results in last 5 years          Hepatitis C Screening Once for adults born between 80 and 1965  More frequently in patients at high risk for Hepatitis C Hep C Antibody: Not on file       Diabetes Screening 1-2 times per year if you're at risk for diabetes or have pre-diabetes Fasting glucose: 235 mg/dL   A1C: 8 1    Screening Not Indicated  History Diabetes   Cholesterol Screening Once every 5 years if you don't have a lipid disorder  May order more often based on risk factors  Lipid panel: 10/15/2020    Screening Not Indicated  History Lipid Disorder      Other Preventive Screenings Covered by Medicare:  1  Abdominal Aortic Aneurysm (AAA) Screening: covered once if your at risk  You're considered to be at risk if you have a family history of AAA or a male between the age of 73-68 who smoking at least 100 cigarettes in your lifetime  2  Lung Cancer Screening: covers low dose CT scan once per year if you meet all of the following conditions: (1) Age 50-69; (2) No signs or symptoms of lung cancer; (3) Current smoker or have quit smoking within the last 15 years; (4) You have a tobacco smoking history of at least 30 pack years (packs per day x number of years you smoked); (5) You get a written order from a healthcare provider  3  Glaucoma Screening: covered annually if you're considered high risk: (1) You have diabetes OR (2) Family history of glaucoma OR (3)  aged 48 and older OR (3)  American aged 72 and older  3  Osteoporosis Screening: covered every 2 years if you meet one of the following conditions: (1) Have a vertebral abnormality; (2) On glucocorticoid therapy for more than 3 months; (3) Have primary hyperparathyroidism; (4) On osteoporosis medications and need to assess response to drug therapy  5  HIV Screening: covered annually if you're between the age of 12-76  Also covered annually if you are younger than 13 and older than 72 with risk factors for HIV infection  For pregnant patients, it is covered up to 3 times per pregnancy      Immunizations:  Immunization Recommendations   Influenza Vaccine Annual influenza vaccination during flu season is recommended for all persons aged >= 6 months who do not have contraindications   Pneumococcal Vaccine (Prevnar and Pneumovax)  * Prevnar = PCV13  * Pneumovax = PPSV23 Adults 25-60 years old: 1-3 doses may be recommended based on certain risk factors  Adults 72 years old: Prevnar (PCV13) vaccine recommended followed by Pneumovax (PPSV23) vaccine  If already received PPSV23 since turning 65, then PCV13 recommended at least one year after PPSV23 dose  Hepatitis B Vaccine 3 dose series if at intermediate or high risk (ex: diabetes, end stage renal disease, liver disease)   Tetanus (Td) Vaccine - COST NOT COVERED BY MEDICARE PART B Following completion of primary series, a booster dose should be given every 10 years to maintain immunity against tetanus  Td may also be given as tetanus wound prophylaxis  Tdap Vaccine - COST NOT COVERED BY MEDICARE PART B Recommended at least once for all adults  For pregnant patients, recommended with each pregnancy  Shingles Vaccine (Shingrix) - COST NOT COVERED BY MEDICARE PART B  2 shot series recommended in those aged 48 and above     Health Maintenance Due:      Topic Date Due    Hepatitis C Screening  1960    Colonoscopy Surveillance  09/11/2022    Colorectal Cancer Screening  09/11/2027    HIV Screening  Completed     Immunizations Due:  There are no preventive care reminders to display for this patient  Advance Directives   What are advance directives? Advance directives are legal documents that state your wishes and plans for medical care  These plans are made ahead of time in case you lose your ability to make decisions for yourself  Advance directives can apply to any medical decision, such as the treatments you want, and if you want to donate organs  What are the types of advance directives? There are many types of advance directives, and each state has rules about how to use them  You may choose a combination of any of the following:  · Living will: This is a written record of the treatment you want  You can also choose which treatments you do not want, which to limit, and which to stop at a certain time  This includes surgery, medicine, IV fluid, and tube feedings     · Durable power of  for healthcare [place] [facility]): This is a written record that states who you want to make healthcare choices for you when you are unable to make them for yourself  This person, called a proxy, is usually a family member or a friend  You may choose more than 1 proxy  · Do not resuscitate (DNR) order:  A DNR order is used in case your heart stops beating or you stop breathing  It is a request not to have certain forms of treatment, such as CPR  A DNR order may be included in other types of advance directives  · Medical directive: This covers the care that you want if you are in a coma, near death, or unable to make decisions for yourself  You can list the treatments you want for each condition  Treatment may include pain medicine, surgery, blood transfusions, dialysis, IV or tube feedings, and a ventilator (breathing machine)  · Values history: This document has questions about your views, beliefs, and how you feel and think about life  This information can help others choose the care that you would choose  Why are advance directives important? An advance directive helps you control your care  Although spoken wishes may be used, it is better to have your wishes written down  Spoken wishes can be misunderstood, or not followed  Treatments may be given even if you do not want them  An advance directive may make it easier for your family to make difficult choices about your care  Weight Management   Why it is important to manage your weight:  Being overweight increases your risk of health conditions such as heart disease, high blood pressure, type 2 diabetes, and certain types of cancer  It can also increase your risk for osteoarthritis, sleep apnea, and other respiratory problems  Aim for a slow, steady weight loss  Even a small amount of weight loss can lower your risk of health problems    How to lose weight safely:  A safe and healthy way to lose weight is to eat fewer calories and get regular exercise  You can lose up about 1 pound a week by decreasing the number of calories you eat by 500 calories each day  Healthy meal plan for weight management:  A healthy meal plan includes a variety of foods, contains fewer calories, and helps you stay healthy  A healthy meal plan includes the following:  · Eat whole-grain foods more often  A healthy meal plan should contain fiber  Fiber is the part of grains, fruits, and vegetables that is not broken down by your body  Whole-grain foods are healthy and provide extra fiber in your diet  Some examples of whole-grain foods are whole-wheat breads and pastas, oatmeal, brown rice, and bulgur  · Eat a variety of vegetables every day  Include dark, leafy greens such as spinach, kale, danilo greens, and mustard greens  Eat yellow and orange vegetables such as carrots, sweet potatoes, and winter squash  · Eat a variety of fruits every day  Choose fresh or canned fruit (canned in its own juice or light syrup) instead of juice  Fruit juice has very little or no fiber  · Eat low-fat dairy foods  Drink fat-free (skim) milk or 1% milk  Eat fat-free yogurt and low-fat cottage cheese  Try low-fat cheeses such as mozzarella and other reduced-fat cheeses  · Choose meat and other protein foods that are low in fat  Choose beans or other legumes such as split peas or lentils  Choose fish, skinless poultry (chicken or turkey), or lean cuts of red meat (beef or pork)  Before you cook meat or poultry, cut off any visible fat  · Use less fat and oil  Try baking foods instead of frying them  Add less fat, such as margarine, sour cream, regular salad dressing and mayonnaise to foods  Eat fewer high-fat foods  Some examples of high-fat foods include french fries, doughnuts, ice cream, and cakes  · Eat fewer sweets  Limit foods and drinks that are high in sugar  This includes candy, cookies, regular soda, and sweetened drinks    Exercise:  Exercise at least 30 minutes per day on most days of the week  Some examples of exercise include walking, biking, dancing, and swimming  You can also fit in more physical activity by taking the stairs instead of the elevator or parking farther away from stores  Ask your healthcare provider about the best exercise plan for you  © Copyright [employer] 2018 Information is for End User's use only and may not be sold, redistributed or otherwise used for commercial purposes  All illustrations and images included in CareNotes® are the copyrighted property of [employer]  or [facility] Preventive Visit Patient Instructions  Thank you for completing your Welcome to Medicare Visit or Medicare Annual Wellness Visit today  Your next wellness visit will be due in one year (2/17/2022)  The screening/preventive services that you may require over the next 5-10 years are detailed below  Some tests may not apply to you based off risk factors and/or age  Screening tests ordered at today's visit but not completed yet may show as past due  Also, please note that scanned in results may not display below  Preventive Screenings:  Service Recommendations Previous Testing/Comments   Colorectal Cancer Screening  · Colonoscopy    · Fecal Occult Blood Test (FOBT)/Fecal Immunochemical Test (FIT)  · Fecal DNA/Cologuard Test  · Flexible Sigmoidoscopy Age: 54-65 years old   Colonoscopy: every 10 years (May be performed more frequently if at higher risk)  OR  FOBT/FIT: every 1 year  OR  Cologuard: every 3 years  OR  Sigmoidoscopy: every 5 years  Screening may be recommended earlier than age 48 if at higher risk for colorectal cancer  Also, an individualized decision between you and your healthcare provider will decide whether screening between the ages of 74-80 would be appropriate   Colonoscopy: 09/11/2017  FOBT/FIT: Not on file  Cologuard: Not on file  Sigmoidoscopy: Not on file    Screening Current     Prostate Cancer Screening Individualized decision between patient and health care provider in men between ages of 53-78   Medicare will cover every 12 months beginning on the day after your 50th birthday PSA: No results in last 5 years          Hepatitis C Screening Once for adults born between 80 and 1965  More frequently in patients at high risk for Hepatitis C Hep C Antibody: Not on file       Diabetes Screening 1-2 times per year if you're at risk for diabetes or have pre-diabetes Fasting glucose: 235 mg/dL   A1C: 8 1    Screening Not Indicated  History Diabetes   Cholesterol Screening Once every 5 years if you don't have a lipid disorder  May order more often based on risk factors  Lipid panel: 10/15/2020    Screening Not Indicated  History Lipid Disorder      Other Preventive Screenings Covered by Medicare:  6  Abdominal Aortic Aneurysm (AAA) Screening: covered once if your at risk  You're considered to be at risk if you have a family history of AAA or a male between the age of 73-68 who smoking at least 100 cigarettes in your lifetime  7  Lung Cancer Screening: covers low dose CT scan once per year if you meet all of the following conditions: (1) Age 50-69; (2) No signs or symptoms of lung cancer; (3) Current smoker or have quit smoking within the last 15 years; (4) You have a tobacco smoking history of at least 30 pack years (packs per day x number of years you smoked); (5) You get a written order from a healthcare provider  8  Glaucoma Screening: covered annually if you're considered high risk: (1) You have diabetes OR (2) Family history of glaucoma OR (3)  aged 48 and older OR (3)  American aged 72 and older  5  Osteoporosis Screening: covered every 2 years if you meet one of the following conditions: (1) Have a vertebral abnormality; (2) On glucocorticoid therapy for more than 3 months; (3) Have primary hyperparathyroidism; (4) On osteoporosis medications and need to assess response to drug therapy    10  HIV Screening: covered annually if you're between the age of 12-76  Also covered annually if you are younger than 13 and older than 72 with risk factors for HIV infection  For pregnant patients, it is covered up to 3 times per pregnancy  Immunizations:  Immunization Recommendations   Influenza Vaccine Annual influenza vaccination during flu season is recommended for all persons aged >= 6 months who do not have contraindications   Pneumococcal Vaccine (Prevnar and Pneumovax)  * Prevnar = PCV13  * Pneumovax = PPSV23 Adults 25-60 years old: 1-3 doses may be recommended based on certain risk factors  Adults 72 years old: Prevnar (PCV13) vaccine recommended followed by Pneumovax (PPSV23) vaccine  If already received PPSV23 since turning 65, then PCV13 recommended at least one year after PPSV23 dose  Hepatitis B Vaccine 3 dose series if at intermediate or high risk (ex: diabetes, end stage renal disease, liver disease)   Tetanus (Td) Vaccine - COST NOT COVERED BY MEDICARE PART B Following completion of primary series, a booster dose should be given every 10 years to maintain immunity against tetanus  Td may also be given as tetanus wound prophylaxis  Tdap Vaccine - COST NOT COVERED BY MEDICARE PART B Recommended at least once for all adults  For pregnant patients, recommended with each pregnancy  Shingles Vaccine (Shingrix) - COST NOT COVERED BY MEDICARE PART B  2 shot series recommended in those aged 48 and above     Health Maintenance Due:      Topic Date Due    Hepatitis C Screening  1960    Colonoscopy Surveillance  09/11/2022    Colorectal Cancer Screening  09/11/2027    HIV Screening  Completed     Immunizations Due:  There are no preventive care reminders to display for this patient  Advance Directives   What are advance directives? Advance directives are legal documents that state your wishes and plans for medical care  These plans are made ahead of time in case you lose your ability to make decisions for yourself  Advance directives can apply to any medical decision, such as the treatments you want, and if you want to donate organs  What are the types of advance directives? There are many types of advance directives, and each state has rules about how to use them  You may choose a combination of any of the following:  · Living will: This is a written record of the treatment you want  You can also choose which treatments you do not want, which to limit, and which to stop at a certain time  This includes surgery, medicine, IV fluid, and tube feedings  · Durable power of  for healthcare [place] [facility]): This is a written record that states who you want to make healthcare choices for you when you are unable to make them for yourself  This person, called a proxy, is usually a family member or a friend  You may choose more than 1 proxy  · Do not resuscitate (DNR) order:  A DNR order is used in case your heart stops beating or you stop breathing  It is a request not to have certain forms of treatment, such as CPR  A DNR order may be included in other types of advance directives  · Medical directive: This covers the care that you want if you are in a coma, near death, or unable to make decisions for yourself  You can list the treatments you want for each condition  Treatment may include pain medicine, surgery, blood transfusions, dialysis, IV or tube feedings, and a ventilator (breathing machine)  · Values history: This document has questions about your views, beliefs, and how you feel and think about life  This information can help others choose the care that you would choose  Why are advance directives important? An advance directive helps you control your care  Although spoken wishes may be used, it is better to have your wishes written down  Spoken wishes can be misunderstood, or not followed  Treatments may be given even if you do not want them   An advance directive may make it easier for your family to make difficult choices about your care  Weight Management   Why it is important to manage your weight:  Being overweight increases your risk of health conditions such as heart disease, high blood pressure, type 2 diabetes, and certain types of cancer  It can also increase your risk for osteoarthritis, sleep apnea, and other respiratory problems  Aim for a slow, steady weight loss  Even a small amount of weight loss can lower your risk of health problems  How to lose weight safely:  A safe and healthy way to lose weight is to eat fewer calories and get regular exercise  You can lose up about 1 pound a week by decreasing the number of calories you eat by 500 calories each day  Healthy meal plan for weight management:  A healthy meal plan includes a variety of foods, contains fewer calories, and helps you stay healthy  A healthy meal plan includes the following:  · Eat whole-grain foods more often  A healthy meal plan should contain fiber  Fiber is the part of grains, fruits, and vegetables that is not broken down by your body  Whole-grain foods are healthy and provide extra fiber in your diet  Some examples of whole-grain foods are whole-wheat breads and pastas, oatmeal, brown rice, and bulgur  · Eat a variety of vegetables every day  Include dark, leafy greens such as spinach, kale, danilo greens, and mustard greens  Eat yellow and orange vegetables such as carrots, sweet potatoes, and winter squash  · Eat a variety of fruits every day  Choose fresh or canned fruit (canned in its own juice or light syrup) instead of juice  Fruit juice has very little or no fiber  · Eat low-fat dairy foods  Drink fat-free (skim) milk or 1% milk  Eat fat-free yogurt and low-fat cottage cheese  Try low-fat cheeses such as mozzarella and other reduced-fat cheeses  · Choose meat and other protein foods that are low in fat  Choose beans or other legumes such as split peas or lentils   Choose fish, skinless poultry (chicken or turkey), or lean cuts of red meat (beef or pork)  Before you cook meat or poultry, cut off any visible fat  · Use less fat and oil  Try baking foods instead of frying them  Add less fat, such as margarine, sour cream, regular salad dressing and mayonnaise to foods  Eat fewer high-fat foods  Some examples of high-fat foods include french fries, doughnuts, ice cream, and cakes  · Eat fewer sweets  Limit foods and drinks that are high in sugar  This includes candy, cookies, regular soda, and sweetened drinks  Exercise:  Exercise at least 30 minutes per day on most days of the week  Some examples of exercise include walking, biking, dancing, and swimming  You can also fit in more physical activity by taking the stairs instead of the elevator or parking farther away from stores  Ask your healthcare provider about the best exercise plan for you  © Copyright [employer] 2018 Information is for End User's use only and may not be sold, redistributed or otherwise used for commercial purposes  All illustrations and images included in CareNotes® are the copyrighted property of [employer]  or [facility] Preventive Visit Patient Instructions  Thank you for completing your Welcome to Medicare Visit or Medicare Annual Wellness Visit today  Your next wellness visit will be due in one year (2/17/2022)  The screening/preventive services that you may require over the next 5-10 years are detailed below  Some tests may not apply to you based off risk factors and/or age  Screening tests ordered at today's visit but not completed yet may show as past due  Also, please note that scanned in results may not display below    Preventive Screenings:  Service Recommendations Previous Testing/Comments   Colorectal Cancer Screening  · Colonoscopy    · Fecal Occult Blood Test (FOBT)/Fecal Immunochemical Test (FIT)  · Fecal DNA/Cologuard Test  · Flexible Sigmoidoscopy Age: 54-65 years old   Colonoscopy: every 10 years (May be performed more frequently if at higher risk)  OR  FOBT/FIT: every 1 year  OR  Cologuard: every 3 years  OR  Sigmoidoscopy: every 5 years  Screening may be recommended earlier than age 48 if at higher risk for colorectal cancer  Also, an individualized decision between you and your healthcare provider will decide whether screening between the ages of 74-80 would be appropriate  Colonoscopy: 09/11/2017  FOBT/FIT: Not on file  Cologuard: Not on file  Sigmoidoscopy: Not on file    Screening Current     Prostate Cancer Screening Individualized decision between patient and health care provider in men between ages of 53-78   Medicare will cover every 12 months beginning on the day after your 50th birthday PSA: No results in last 5 years          Hepatitis C Screening Once for adults born between 80 and 1965  More frequently in patients at high risk for Hepatitis C Hep C Antibody: Not on file       Diabetes Screening 1-2 times per year if you're at risk for diabetes or have pre-diabetes Fasting glucose: 235 mg/dL   A1C: 8 1    Screening Not Indicated  History Diabetes   Cholesterol Screening Once every 5 years if you don't have a lipid disorder  May order more often based on risk factors  Lipid panel: 10/15/2020    Screening Not Indicated  History Lipid Disorder      Other Preventive Screenings Covered by Medicare:  11  Abdominal Aortic Aneurysm (AAA) Screening: covered once if your at risk  You're considered to be at risk if you have a family history of AAA or a male between the age of 73-68 who smoking at least 100 cigarettes in your lifetime    12  Lung Cancer Screening: covers low dose CT scan once per year if you meet all of the following conditions: (1) Age 50-69; (2) No signs or symptoms of lung cancer; (3) Current smoker or have quit smoking within the last 15 years; (4) You have a tobacco smoking history of at least 30 pack years (packs per day x number of years you smoked); (5) You get a written order from a healthcare provider  15  Glaucoma Screening: covered annually if you're considered high risk: (1) You have diabetes OR (2) Family history of glaucoma OR (3)  aged 48 and older OR (3)  American aged 72 and older  15  Osteoporosis Screening: covered every 2 years if you meet one of the following conditions: (1) Have a vertebral abnormality; (2) On glucocorticoid therapy for more than 3 months; (3) Have primary hyperparathyroidism; (4) On osteoporosis medications and need to assess response to drug therapy  15  HIV Screening: covered annually if you're between the age of 12-76  Also covered annually if you are younger than 13 and older than 72 with risk factors for HIV infection  For pregnant patients, it is covered up to 3 times per pregnancy  Immunizations:  Immunization Recommendations   Influenza Vaccine Annual influenza vaccination during flu season is recommended for all persons aged >= 6 months who do not have contraindications   Pneumococcal Vaccine (Prevnar and Pneumovax)  * Prevnar = PCV13  * Pneumovax = PPSV23 Adults 25-60 years old: 1-3 doses may be recommended based on certain risk factors  Adults 72 years old: Prevnar (PCV13) vaccine recommended followed by Pneumovax (PPSV23) vaccine  If already received PPSV23 since turning 65, then PCV13 recommended at least one year after PPSV23 dose  Hepatitis B Vaccine 3 dose series if at intermediate or high risk (ex: diabetes, end stage renal disease, liver disease)   Tetanus (Td) Vaccine - COST NOT COVERED BY MEDICARE PART B Following completion of primary series, a booster dose should be given every 10 years to maintain immunity against tetanus  Td may also be given as tetanus wound prophylaxis  Tdap Vaccine - COST NOT COVERED BY MEDICARE PART B Recommended at least once for all adults  For pregnant patients, recommended with each pregnancy     Shingles Vaccine (Shingrix) - COST NOT COVERED BY MEDICARE PART B  2 shot series recommended in those aged 48 and above     Health Maintenance Due:      Topic Date Due    Hepatitis C Screening  1960    Colonoscopy Surveillance  09/11/2022    Colorectal Cancer Screening  09/11/2027    HIV Screening  Completed     Immunizations Due:  There are no preventive care reminders to display for this patient  Advance Directives   What are advance directives? Advance directives are legal documents that state your wishes and plans for medical care  These plans are made ahead of time in case you lose your ability to make decisions for yourself  Advance directives can apply to any medical decision, such as the treatments you want, and if you want to donate organs  What are the types of advance directives? There are many types of advance directives, and each state has rules about how to use them  You may choose a combination of any of the following:  · Living will: This is a written record of the treatment you want  You can also choose which treatments you do not want, which to limit, and which to stop at a certain time  This includes surgery, medicine, IV fluid, and tube feedings  · Durable power of  for healthcare [facility]): This is a written record that states who you want to make healthcare choices for you when you are unable to make them for yourself  This person, called a proxy, is usually a family member or a friend  You may choose more than 1 proxy  · Do not resuscitate (DNR) order:  A DNR order is used in case your heart stops beating or you stop breathing  It is a request not to have certain forms of treatment, such as CPR  A DNR order may be included in other types of advance directives  · Medical directive: This covers the care that you want if you are in a coma, near death, or unable to make decisions for yourself  You can list the treatments you want for each condition   Treatment may include pain medicine, surgery, blood transfusions, dialysis, IV or tube feedings, and a ventilator (breathing machine)  · Values history: This document has questions about your views, beliefs, and how you feel and think about life  This information can help others choose the care that you would choose  Why are advance directives important? An advance directive helps you control your care  Although spoken wishes may be used, it is better to have your wishes written down  Spoken wishes can be misunderstood, or not followed  Treatments may be given even if you do not want them  An advance directive may make it easier for your family to make difficult choices about your care  Weight Management   Why it is important to manage your weight:  Being overweight increases your risk of health conditions such as heart disease, high blood pressure, type 2 diabetes, and certain types of cancer  It can also increase your risk for osteoarthritis, sleep apnea, and other respiratory problems  Aim for a slow, steady weight loss  Even a small amount of weight loss can lower your risk of health problems  How to lose weight safely:  A safe and healthy way to lose weight is to eat fewer calories and get regular exercise  You can lose up about 1 pound a week by decreasing the number of calories you eat by 500 calories each day  Healthy meal plan for weight management:  A healthy meal plan includes a variety of foods, contains fewer calories, and helps you stay healthy  A healthy meal plan includes the following:  · Eat whole-grain foods more often  A healthy meal plan should contain fiber  Fiber is the part of grains, fruits, and vegetables that is not broken down by your body  Whole-grain foods are healthy and provide extra fiber in your diet  Some examples of whole-grain foods are whole-wheat breads and pastas, oatmeal, brown rice, and bulgur  · Eat a variety of vegetables every day  Include dark, leafy greens such as spinach, kale, danilo greens, and mustard greens   Eat yellow and orange vegetables such as carrots, sweet potatoes, and winter squash  · Eat a variety of fruits every day  Choose fresh or canned fruit (canned in its own juice or light syrup) instead of juice  Fruit juice has very little or no fiber  · Eat low-fat dairy foods  Drink fat-free (skim) milk or 1% milk  Eat fat-free yogurt and low-fat cottage cheese  Try low-fat cheeses such as mozzarella and other reduced-fat cheeses  · Choose meat and other protein foods that are low in fat  Choose beans or other legumes such as split peas or lentils  Choose fish, skinless poultry (chicken or turkey), or lean cuts of red meat (beef or pork)  Before you cook meat or poultry, cut off any visible fat  · Use less fat and oil  Try baking foods instead of frying them  Add less fat, such as margarine, sour cream, regular salad dressing and mayonnaise to foods  Eat fewer high-fat foods  Some examples of high-fat foods include french fries, doughnuts, ice cream, and cakes  · Eat fewer sweets  Limit foods and drinks that are high in sugar  This includes candy, cookies, regular soda, and sweetened drinks  Exercise:  Exercise at least 30 minutes per day on most days of the week  Some examples of exercise include walking, biking, dancing, and swimming  You can also fit in more physical activity by taking the stairs instead of the elevator or parking farther away from stores  Ask your healthcare provider about the best exercise plan for you  © Copyright [employer] 2018 Information is for End User's use only and may not be sold, redistributed or otherwise used for commercial purposes   All illustrations and images included in CareNotes® are the copyrighted property of [employer]  or [address] [place] [address] [facility]
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
## 2021-02-23 ENCOUNTER — TELEPHONE (OUTPATIENT)
Dept: CARDIOLOGY CLINIC | Facility: CLINIC | Age: 61
End: 2021-02-23

## 2021-02-23 NOTE — TELEPHONE ENCOUNTER
Pt called, symptoms of chest tightness and pressure on left side  Yesterday pt weighed 266, today he weighed himself and weighed himself 277  pls advise, thank you

## 2021-02-23 NOTE — TELEPHONE ENCOUNTER
I called pt back, he stated he had a visiting nurse come by today and they believe it might be something wrong with the scale

## 2021-02-24 ENCOUNTER — HOSPITAL ENCOUNTER (OUTPATIENT)
Dept: RADIOLOGY | Facility: HOSPITAL | Age: 61
Discharge: HOME | End: 2021-02-24
Attending: INTERNAL MEDICINE
Payer: MEDICARE

## 2021-02-24 ENCOUNTER — HOSPITAL ENCOUNTER (OUTPATIENT)
Dept: NON INVASIVE DIAGNOSTICS | Facility: HOSPITAL | Age: 61
Discharge: HOME | End: 2021-02-24
Attending: INTERNAL MEDICINE
Payer: MEDICARE

## 2021-02-24 DIAGNOSIS — I25.10 CORONARY ARTERY DISEASE INVOLVING NATIVE HEART WITHOUT ANGINA PECTORIS, UNSPECIFIED VESSEL OR LESION TYPE: ICD-10-CM

## 2021-02-24 DIAGNOSIS — I50.32 CHRONIC DIASTOLIC CONGESTIVE HEART FAILURE (HCC): ICD-10-CM

## 2021-02-24 PROCEDURE — 93018 CV STRESS TEST I&R ONLY: CPT | Performed by: INTERNAL MEDICINE

## 2021-02-24 PROCEDURE — 78452 HT MUSCLE IMAGE SPECT MULT: CPT | Performed by: INTERNAL MEDICINE

## 2021-02-24 PROCEDURE — 93017 CV STRESS TEST TRACING ONLY: CPT

## 2021-02-24 PROCEDURE — 78452 HT MUSCLE IMAGE SPECT MULT: CPT

## 2021-02-24 PROCEDURE — G1004 CDSM NDSC: HCPCS

## 2021-02-24 PROCEDURE — A9502 TC99M TETROFOSMIN: HCPCS

## 2021-02-24 PROCEDURE — 93016 CV STRESS TEST SUPVJ ONLY: CPT | Performed by: INTERNAL MEDICINE

## 2021-02-24 RX ADMIN — REGADENOSON 0.4 MG: 0.08 INJECTION, SOLUTION INTRAVENOUS at 10:36

## 2021-02-25 ENCOUNTER — TELEPHONE (OUTPATIENT)
Dept: CARDIOLOGY CLINIC | Facility: CLINIC | Age: 61
End: 2021-02-25

## 2021-02-25 LAB
CHEST PAIN STATEMENT: NORMAL
MAX DIASTOLIC BP: 58 MMHG
MAX HEART RATE: 87 BPM
MAX PREDICTED HEART RATE: 159 BPM
MAX. SYSTOLIC BP: 132 MMHG
PROTOCOL NAME: NORMAL
REASON FOR TERMINATION: NORMAL
TARGET HR FORMULA: NORMAL
TEST INDICATION: NORMAL
TIME IN EXERCISE PHASE: NORMAL

## 2021-02-25 NOTE — TELEPHONE ENCOUNTER
----- Message from Bartolome Eller DO sent at 2/25/2021 12:54 PM EST -----  Can you please let the patient know stress test was normal

## 2021-02-26 ENCOUNTER — TELEPHONE (OUTPATIENT)
Dept: GASTROENTEROLOGY | Facility: CLINIC | Age: 61
End: 2021-02-26

## 2021-02-26 NOTE — TELEPHONE ENCOUNTER
Called and spoke to pt and he is rescheduled to 3/31/21  I also went over him covid testing 6 days prior as date has changed and to go the Thursday before to [facility]

## 2021-02-26 NOTE — TELEPHONE ENCOUNTER
I lmom for pt to please call back to reschedule his procedure at [facility] on 3/30 to either 3/31/21 or 4/1/21  Will call pt again in one week if do not hear back from him

## 2021-02-28 PROBLEM — Z00.00 MEDICARE ANNUAL WELLNESS VISIT, SUBSEQUENT: Status: ACTIVE | Noted: 2021-02-28

## 2021-02-28 NOTE — PROGRESS NOTES
Assessment/Plan:    1  Medicare annual wellness visit, subsequent    2  Benign essential hypertension    3  Bipolar affective disorder, remission status unspecified ([place] [address] )    4  Chronic diastolic congestive heart failure ([place] [address] )    5  Chronic obstructive pulmonary disease, unspecified COPD type ([place] [address] )    6  Coronary artery disease involving native heart without angina pectoris, unspecified vessel or lesion type    7  Diabetic polyneuropathy associated with type 2 diabetes mellitus ([place] [address] )    8  Gastroesophageal reflux disease without esophagitis    9  Hyperlipidemia, unspecified hyperlipidemia type    10  PAF (paroxysmal atrial fibrillation) ([place] [address] )    cont current meds  Forms to be  completed for renewal of resp med supplies  Upcoming stress test (resched due to weather)  Cont  Home O2, cpap  ADA, low chol, low salt diet  Encouraged increased daily activity        Patient Instructions       Medicare Preventive Visit Patient Instructions  Thank you for completing your Welcome to Medicare Visit or Medicare Annual Wellness Visit today  Your next wellness visit will be due in one year (2/17/2022)  The screening/preventive services that you may require over the next 5-10 years are detailed below  Some tests may not apply to you based off risk factors and/or age  Screening tests ordered at today's visit but not completed yet may show as past due  Also, please note that scanned in results may not display below    Preventive Screenings:  Service Recommendations Previous Testing/Comments   Colorectal Cancer Screening  · Colonoscopy    · Fecal Occult Blood Test (FOBT)/Fecal Immunochemical Test (FIT)  · Fecal DNA/Cologuard Test  · Flexible Sigmoidoscopy Age: 54-65 years old   Colonoscopy: every 10 years (May be performed more frequently if at higher risk)  OR  FOBT/FIT: every 1 year  OR  Cologuard: every 3 years  OR  Sigmoidoscopy: every 5 years  Screening may be recommended earlier than age 48 if at higher risk for colorectal cancer  Also, an individualized decision between you and your healthcare provider will decide whether screening between the ages of 74-80 would be appropriate  Colonoscopy: 09/11/2017  FOBT/FIT: Not on file  Cologuard: Not on file  Sigmoidoscopy: Not on file    Screening Current     Prostate Cancer Screening Individualized decision between patient and health care provider in men between ages of 53-78   Medicare will cover every 12 months beginning on the day after your 50th birthday PSA: No results in last 5 years          Hepatitis C Screening Once for adults born between 80 and 1965  More frequently in patients at high risk for Hepatitis C Hep C Antibody: Not on file       Diabetes Screening 1-2 times per year if you're at risk for diabetes or have pre-diabetes Fasting glucose: 235 mg/dL   A1C: 8 1    Screening Not Indicated  History Diabetes   Cholesterol Screening Once every 5 years if you don't have a lipid disorder  May order more often based on risk factors  Lipid panel: 10/15/2020    Screening Not Indicated  History Lipid Disorder      Other Preventive Screenings Covered by Medicare:  1  Abdominal Aortic Aneurysm (AAA) Screening: covered once if your at risk  You're considered to be at risk if you have a family history of AAA or a male between the age of 73-68 who smoking at least 100 cigarettes in your lifetime  2  Lung Cancer Screening: covers low dose CT scan once per year if you meet all of the following conditions: (1) Age 50-69; (2) No signs or symptoms of lung cancer; (3) Current smoker or have quit smoking within the last 15 years; (4) You have a tobacco smoking history of at least 30 pack years (packs per day x number of years you smoked); (5) You get a written order from a healthcare provider    3  Glaucoma Screening: covered annually if you're considered high risk: (1) You have diabetes OR (2) Family history of glaucoma OR (3)  aged 48 and older OR (3)  American aged 72 and older  4  Osteoporosis Screening: covered every 2 years if you meet one of the following conditions: (1) Have a vertebral abnormality; (2) On glucocorticoid therapy for more than 3 months; (3) Have primary hyperparathyroidism; (4) On osteoporosis medications and need to assess response to drug therapy  5  HIV Screening: covered annually if you're between the age of 12-76  Also covered annually if you are younger than 13 and older than 72 with risk factors for HIV infection  For pregnant patients, it is covered up to 3 times per pregnancy  Immunizations:  Immunization Recommendations   Influenza Vaccine Annual influenza vaccination during flu season is recommended for all persons aged >= 6 months who do not have contraindications   Pneumococcal Vaccine (Prevnar and Pneumovax)  * Prevnar = PCV13  * Pneumovax = PPSV23 Adults 25-60 years old: 1-3 doses may be recommended based on certain risk factors  Adults 72 years old: Prevnar (PCV13) vaccine recommended followed by Pneumovax (PPSV23) vaccine  If already received PPSV23 since turning 65, then PCV13 recommended at least one year after PPSV23 dose  Hepatitis B Vaccine 3 dose series if at intermediate or high risk (ex: diabetes, end stage renal disease, liver disease)   Tetanus (Td) Vaccine - COST NOT COVERED BY MEDICARE PART B Following completion of primary series, a booster dose should be given every 10 years to maintain immunity against tetanus  Td may also be given as tetanus wound prophylaxis  Tdap Vaccine - COST NOT COVERED BY MEDICARE PART B Recommended at least once for all adults  For pregnant patients, recommended with each pregnancy     Shingles Vaccine (Shingrix) - COST NOT COVERED BY MEDICARE PART B  2 shot series recommended in those aged 48 and above     Health Maintenance Due:      Topic Date Due    Hepatitis C Screening  1960    Colonoscopy Surveillance  09/11/2022    Colorectal Cancer Screening  09/11/2027    HIV Screening Completed     Immunizations Due:  There are no preventive care reminders to display for this patient  Advance Directives   What are advance directives? Advance directives are legal documents that state your wishes and plans for medical care  These plans are made ahead of time in case you lose your ability to make decisions for yourself  Advance directives can apply to any medical decision, such as the treatments you want, and if you want to donate organs  What are the types of advance directives? There are many types of advance directives, and each state has rules about how to use them  You may choose a combination of any of the following:  · Living will: This is a written record of the treatment you want  You can also choose which treatments you do not want, which to limit, and which to stop at a certain time  This includes surgery, medicine, IV fluid, and tube feedings  · Durable power of  for healthcare [facility]): This is a written record that states who you want to make healthcare choices for you when you are unable to make them for yourself  This person, called a proxy, is usually a family member or a friend  You may choose more than 1 proxy  · Do not resuscitate (DNR) order:  A DNR order is used in case your heart stops beating or you stop breathing  It is a request not to have certain forms of treatment, such as CPR  A DNR order may be included in other types of advance directives  · Medical directive: This covers the care that you want if you are in a coma, near death, or unable to make decisions for yourself  You can list the treatments you want for each condition  Treatment may include pain medicine, surgery, blood transfusions, dialysis, IV or tube feedings, and a ventilator (breathing machine)  · Values history: This document has questions about your views, beliefs, and how you feel and think about life  This information can help others choose the care that you would choose    Why are advance directives important? An advance directive helps you control your care  Although spoken wishes may be used, it is better to have your wishes written down  Spoken wishes can be misunderstood, or not followed  Treatments may be given even if you do not want them  An advance directive may make it easier for your family to make difficult choices about your care  Weight Management   Why it is important to manage your weight:  Being overweight increases your risk of health conditions such as heart disease, high blood pressure, type 2 diabetes, and certain types of cancer  It can also increase your risk for osteoarthritis, sleep apnea, and other respiratory problems  Aim for a slow, steady weight loss  Even a small amount of weight loss can lower your risk of health problems  How to lose weight safely:  A safe and healthy way to lose weight is to eat fewer calories and get regular exercise  You can lose up about 1 pound a week by decreasing the number of calories you eat by 500 calories each day  Healthy meal plan for weight management:  A healthy meal plan includes a variety of foods, contains fewer calories, and helps you stay healthy  A healthy meal plan includes the following:  · Eat whole-grain foods more often  A healthy meal plan should contain fiber  Fiber is the part of grains, fruits, and vegetables that is not broken down by your body  Whole-grain foods are healthy and provide extra fiber in your diet  Some examples of whole-grain foods are whole-wheat breads and pastas, oatmeal, brown rice, and bulgur  · Eat a variety of vegetables every day  Include dark, leafy greens such as spinach, kale, danilo greens, and mustard greens  Eat yellow and orange vegetables such as carrots, sweet potatoes, and winter squash  · Eat a variety of fruits every day  Choose fresh or canned fruit (canned in its own juice or light syrup) instead of juice  Fruit juice has very little or no fiber    · Eat low-fat dairy foods  Drink fat-free (skim) milk or 1% milk  Eat fat-free yogurt and low-fat cottage cheese  Try low-fat cheeses such as mozzarella and other reduced-fat cheeses  · Choose meat and other protein foods that are low in fat  Choose beans or other legumes such as split peas or lentils  Choose fish, skinless poultry (chicken or turkey), or lean cuts of red meat (beef or pork)  Before you cook meat or poultry, cut off any visible fat  · Use less fat and oil  Try baking foods instead of frying them  Add less fat, such as margarine, sour cream, regular salad dressing and mayonnaise to foods  Eat fewer high-fat foods  Some examples of high-fat foods include french fries, doughnuts, ice cream, and cakes  · Eat fewer sweets  Limit foods and drinks that are high in sugar  This includes candy, cookies, regular soda, and sweetened drinks  Exercise:  Exercise at least 30 minutes per day on most days of the week  Some examples of exercise include walking, biking, dancing, and swimming  You can also fit in more physical activity by taking the stairs instead of the elevator or parking farther away from stores  Ask your healthcare provider about the best exercise plan for you  © Copyright [employer] 2018 Information is for End User's use only and may not be sold, redistributed or otherwise used for commercial purposes  All illustrations and images included in CareNotes® are the copyrighted property of [employer]  or [facility] Preventive Visit Patient Instructions  Thank you for completing your Welcome to Medicare Visit or Medicare Annual Wellness Visit today  Your next wellness visit will be due in one year (2/17/2022)  The screening/preventive services that you may require over the next 5-10 years are detailed below  Some tests may not apply to you based off risk factors and/or age  Screening tests ordered at today's visit but not completed yet may show as past due   Also, please note that scanned in results may not display below  Preventive Screenings:  Service Recommendations Previous Testing/Comments   Colorectal Cancer Screening  · Colonoscopy    · Fecal Occult Blood Test (FOBT)/Fecal Immunochemical Test (FIT)  · Fecal DNA/Cologuard Test  · Flexible Sigmoidoscopy Age: 54-65 years old   Colonoscopy: every 10 years (May be performed more frequently if at higher risk)  OR  FOBT/FIT: every 1 year  OR  Cologuard: every 3 years  OR  Sigmoidoscopy: every 5 years  Screening may be recommended earlier than age 48 if at higher risk for colorectal cancer  Also, an individualized decision between you and your healthcare provider will decide whether screening between the ages of 74-80 would be appropriate  Colonoscopy: 09/11/2017  FOBT/FIT: Not on file  Cologuard: Not on file  Sigmoidoscopy: Not on file    Screening Current     Prostate Cancer Screening Individualized decision between patient and health care provider in men between ages of 53-78   Medicare will cover every 12 months beginning on the day after your 50th birthday PSA: No results in last 5 years          Hepatitis C Screening Once for adults born between 80 and 1965  More frequently in patients at high risk for Hepatitis C Hep C Antibody: Not on file       Diabetes Screening 1-2 times per year if you're at risk for diabetes or have pre-diabetes Fasting glucose: 235 mg/dL   A1C: 8 1    Screening Not Indicated  History Diabetes   Cholesterol Screening Once every 5 years if you don't have a lipid disorder  May order more often based on risk factors  Lipid panel: 10/15/2020    Screening Not Indicated  History Lipid Disorder      Other Preventive Screenings Covered by Medicare:  6  Abdominal Aortic Aneurysm (AAA) Screening: covered once if your at risk  You're considered to be at risk if you have a family history of AAA or a male between the age of 73-68 who smoking at least 100 cigarettes in your lifetime    7  Lung Cancer Screening: covers low dose CT scan once per year if you meet all of the following conditions: (1) Age 50-69; (2) No signs or symptoms of lung cancer; (3) Current smoker or have quit smoking within the last 15 years; (4) You have a tobacco smoking history of at least 30 pack years (packs per day x number of years you smoked); (5) You get a written order from a healthcare provider  8  Glaucoma Screening: covered annually if you're considered high risk: (1) You have diabetes OR (2) Family history of glaucoma OR (3)  aged 48 and older OR (3)  American aged 72 and older  5  Osteoporosis Screening: covered every 2 years if you meet one of the following conditions: (1) Have a vertebral abnormality; (2) On glucocorticoid therapy for more than 3 months; (3) Have primary hyperparathyroidism; (4) On osteoporosis medications and need to assess response to drug therapy  10  HIV Screening: covered annually if you're between the age of 12-76  Also covered annually if you are younger than 13 and older than 72 with risk factors for HIV infection  For pregnant patients, it is covered up to 3 times per pregnancy  Immunizations:  Immunization Recommendations   Influenza Vaccine Annual influenza vaccination during flu season is recommended for all persons aged >= 6 months who do not have contraindications   Pneumococcal Vaccine (Prevnar and Pneumovax)  * Prevnar = PCV13  * Pneumovax = PPSV23 Adults 25-60 years old: 1-3 doses may be recommended based on certain risk factors  Adults 72 years old: Prevnar (PCV13) vaccine recommended followed by Pneumovax (PPSV23) vaccine  If already received PPSV23 since turning 65, then PCV13 recommended at least one year after PPSV23 dose     Hepatitis B Vaccine 3 dose series if at intermediate or high risk (ex: diabetes, end stage renal disease, liver disease)   Tetanus (Td) Vaccine - COST NOT COVERED BY MEDICARE PART B Following completion of primary series, a booster dose should be given every 10 years to maintain immunity against tetanus  Td may also be given as tetanus wound prophylaxis  Tdap Vaccine - COST NOT COVERED BY MEDICARE PART B Recommended at least once for all adults  For pregnant patients, recommended with each pregnancy  Shingles Vaccine (Shingrix) - COST NOT COVERED BY MEDICARE PART B  2 shot series recommended in those aged 48 and above     Health Maintenance Due:      Topic Date Due    Hepatitis C Screening  1960    Colonoscopy Surveillance  09/11/2022    Colorectal Cancer Screening  09/11/2027    HIV Screening  Completed     Immunizations Due:  There are no preventive care reminders to display for this patient  Advance Directives   What are advance directives? Advance directives are legal documents that state your wishes and plans for medical care  These plans are made ahead of time in case you lose your ability to make decisions for yourself  Advance directives can apply to any medical decision, such as the treatments you want, and if you want to donate organs  What are the types of advance directives? There are many types of advance directives, and each state has rules about how to use them  You may choose a combination of any of the following:  · Living will: This is a written record of the treatment you want  You can also choose which treatments you do not want, which to limit, and which to stop at a certain time  This includes surgery, medicine, IV fluid, and tube feedings  · Durable power of  for healthcare [facility]): This is a written record that states who you want to make healthcare choices for you when you are unable to make them for yourself  This person, called a proxy, is usually a family member or a friend  You may choose more than 1 proxy  · Do not resuscitate (DNR) order:  A DNR order is used in case your heart stops beating or you stop breathing  It is a request not to have certain forms of treatment, such as CPR   A DNR order may be included in other types of advance directives  · Medical directive: This covers the care that you want if you are in a coma, near death, or unable to make decisions for yourself  You can list the treatments you want for each condition  Treatment may include pain medicine, surgery, blood transfusions, dialysis, IV or tube feedings, and a ventilator (breathing machine)  · Values history: This document has questions about your views, beliefs, and how you feel and think about life  This information can help others choose the care that you would choose  Why are advance directives important? An advance directive helps you control your care  Although spoken wishes may be used, it is better to have your wishes written down  Spoken wishes can be misunderstood, or not followed  Treatments may be given even if you do not want them  An advance directive may make it easier for your family to make difficult choices about your care  Weight Management   Why it is important to manage your weight:  Being overweight increases your risk of health conditions such as heart disease, high blood pressure, type 2 diabetes, and certain types of cancer  It can also increase your risk for osteoarthritis, sleep apnea, and other respiratory problems  Aim for a slow, steady weight loss  Even a small amount of weight loss can lower your risk of health problems  How to lose weight safely:  A safe and healthy way to lose weight is to eat fewer calories and get regular exercise  You can lose up about 1 pound a week by decreasing the number of calories you eat by 500 calories each day  Healthy meal plan for weight management:  A healthy meal plan includes a variety of foods, contains fewer calories, and helps you stay healthy  A healthy meal plan includes the following:  · Eat whole-grain foods more often  A healthy meal plan should contain fiber  Fiber is the part of grains, fruits, and vegetables that is not broken down by your body  Whole-grain foods are healthy and provide extra fiber in your diet  Some examples of whole-grain foods are whole-wheat breads and pastas, oatmeal, brown rice, and bulgur  · Eat a variety of vegetables every day  Include dark, leafy greens such as spinach, kale, danilo greens, and mustard greens  Eat yellow and orange vegetables such as carrots, sweet potatoes, and winter squash  · Eat a variety of fruits every day  Choose fresh or canned fruit (canned in its own juice or light syrup) instead of juice  Fruit juice has very little or no fiber  · Eat low-fat dairy foods  Drink fat-free (skim) milk or 1% milk  Eat fat-free yogurt and low-fat cottage cheese  Try low-fat cheeses such as mozzarella and other reduced-fat cheeses  · Choose meat and other protein foods that are low in fat  Choose beans or other legumes such as split peas or lentils  Choose fish, skinless poultry (chicken or turkey), or lean cuts of red meat (beef or pork)  Before you cook meat or poultry, cut off any visible fat  · Use less fat and oil  Try baking foods instead of frying them  Add less fat, such as margarine, sour cream, regular salad dressing and mayonnaise to foods  Eat fewer high-fat foods  Some examples of high-fat foods include french fries, doughnuts, ice cream, and cakes  · Eat fewer sweets  Limit foods and drinks that are high in sugar  This includes candy, cookies, regular soda, and sweetened drinks  Exercise:  Exercise at least 30 minutes per day on most days of the week  Some examples of exercise include walking, biking, dancing, and swimming  You can also fit in more physical activity by taking the stairs instead of the elevator or parking farther away from stores  Ask your healthcare provider about the best exercise plan for you  © Copyright [employer] 2018 Information is for End User's use only and may not be sold, redistributed or otherwise used for commercial purposes   All illustrations and images included in Katie are the copyrighted property of [employer]  or [facility] Preventive Visit Patient Instructions  Thank you for completing your Welcome to Medicare Visit or Medicare Annual Wellness Visit today  Your next wellness visit will be due in one year (2/17/2022)  The screening/preventive services that you may require over the next 5-10 years are detailed below  Some tests may not apply to you based off risk factors and/or age  Screening tests ordered at today's visit but not completed yet may show as past due  Also, please note that scanned in results may not display below  Preventive Screenings:  Service Recommendations Previous Testing/Comments   Colorectal Cancer Screening  · Colonoscopy    · Fecal Occult Blood Test (FOBT)/Fecal Immunochemical Test (FIT)  · Fecal DNA/Cologuard Test  · Flexible Sigmoidoscopy Age: 54-65 years old   Colonoscopy: every 10 years (May be performed more frequently if at higher risk)  OR  FOBT/FIT: every 1 year  OR  Cologuard: every 3 years  OR  Sigmoidoscopy: every 5 years  Screening may be recommended earlier than age 48 if at higher risk for colorectal cancer  Also, an individualized decision between you and your healthcare provider will decide whether screening between the ages of 74-80 would be appropriate   Colonoscopy: 09/11/2017  FOBT/FIT: Not on file  Cologuard: Not on file  Sigmoidoscopy: Not on file    Screening Current     Prostate Cancer Screening Individualized decision between patient and health care provider in men between ages of 53-78   Medicare will cover every 12 months beginning on the day after your 50th birthday PSA: No results in last 5 years          Hepatitis C Screening Once for adults born between 80 and 1965  More frequently in patients at high risk for Hepatitis C Hep C Antibody: Not on file       Diabetes Screening 1-2 times per year if you're at risk for diabetes or have pre-diabetes Fasting glucose: 235 mg/dL   A1C: 8 1    Screening Not Indicated  History Diabetes   Cholesterol Screening Once every 5 years if you don't have a lipid disorder  May order more often based on risk factors  Lipid panel: 10/15/2020    Screening Not Indicated  History Lipid Disorder      Other Preventive Screenings Covered by Medicare:  11  Abdominal Aortic Aneurysm (AAA) Screening: covered once if your at risk  You're considered to be at risk if you have a family history of AAA or a male between the age of 73-68 who smoking at least 100 cigarettes in your lifetime  12  Lung Cancer Screening: covers low dose CT scan once per year if you meet all of the following conditions: (1) Age 50-69; (2) No signs or symptoms of lung cancer; (3) Current smoker or have quit smoking within the last 15 years; (4) You have a tobacco smoking history of at least 30 pack years (packs per day x number of years you smoked); (5) You get a written order from a healthcare provider  15  Glaucoma Screening: covered annually if you're considered high risk: (1) You have diabetes OR (2) Family history of glaucoma OR (3)  aged 48 and older OR (3)  American aged 72 and older  15  Osteoporosis Screening: covered every 2 years if you meet one of the following conditions: (1) Have a vertebral abnormality; (2) On glucocorticoid therapy for more than 3 months; (3) Have primary hyperparathyroidism; (4) On osteoporosis medications and need to assess response to drug therapy  15  HIV Screening: covered annually if you're between the age of 12-76  Also covered annually if you are younger than 13 and older than 72 with risk factors for HIV infection  For pregnant patients, it is covered up to 3 times per pregnancy      Immunizations:  Immunization Recommendations   Influenza Vaccine Annual influenza vaccination during flu season is recommended for all persons aged >= 6 months who do not have contraindications   Pneumococcal Vaccine (Prevnar and Pneumovax)  * Prevnar = PCV13  * Pneumovax = PPSV23 Adults 25-60 years old: 1-3 doses may be recommended based on certain risk factors  Adults 72 years old: Prevnar (PCV13) vaccine recommended followed by Pneumovax (PPSV23) vaccine  If already received PPSV23 since turning 65, then PCV13 recommended at least one year after PPSV23 dose  Hepatitis B Vaccine 3 dose series if at intermediate or high risk (ex: diabetes, end stage renal disease, liver disease)   Tetanus (Td) Vaccine - COST NOT COVERED BY MEDICARE PART B Following completion of primary series, a booster dose should be given every 10 years to maintain immunity against tetanus  Td may also be given as tetanus wound prophylaxis  Tdap Vaccine - COST NOT COVERED BY MEDICARE PART B Recommended at least once for all adults  For pregnant patients, recommended with each pregnancy  Shingles Vaccine (Shingrix) - COST NOT COVERED BY MEDICARE PART B  2 shot series recommended in those aged 48 and above     Health Maintenance Due:      Topic Date Due    Hepatitis C Screening  1960    Colonoscopy Surveillance  09/11/2022    Colorectal Cancer Screening  09/11/2027    HIV Screening  Completed     Immunizations Due:  There are no preventive care reminders to display for this patient  Advance Directives   What are advance directives? Advance directives are legal documents that state your wishes and plans for medical care  These plans are made ahead of time in case you lose your ability to make decisions for yourself  Advance directives can apply to any medical decision, such as the treatments you want, and if you want to donate organs  What are the types of advance directives? There are many types of advance directives, and each state has rules about how to use them  You may choose a combination of any of the following:  · Living will: This is a written record of the treatment you want   You can also choose which treatments you do not want, which to limit, and which to stop at a certain time  This includes surgery, medicine, IV fluid, and tube feedings  · Durable power of  for healthcare [facility]): This is a written record that states who you want to make healthcare choices for you when you are unable to make them for yourself  This person, called a proxy, is usually a family member or a friend  You may choose more than 1 proxy  · Do not resuscitate (DNR) order:  A DNR order is used in case your heart stops beating or you stop breathing  It is a request not to have certain forms of treatment, such as CPR  A DNR order may be included in other types of advance directives  · Medical directive: This covers the care that you want if you are in a coma, near death, or unable to make decisions for yourself  You can list the treatments you want for each condition  Treatment may include pain medicine, surgery, blood transfusions, dialysis, IV or tube feedings, and a ventilator (breathing machine)  · Values history: This document has questions about your views, beliefs, and how you feel and think about life  This information can help others choose the care that you would choose  Why are advance directives important? An advance directive helps you control your care  Although spoken wishes may be used, it is better to have your wishes written down  Spoken wishes can be misunderstood, or not followed  Treatments may be given even if you do not want them  An advance directive may make it easier for your family to make difficult choices about your care  Weight Management   Why it is important to manage your weight:  Being overweight increases your risk of health conditions such as heart disease, high blood pressure, type 2 diabetes, and certain types of cancer  It can also increase your risk for osteoarthritis, sleep apnea, and other respiratory problems  Aim for a slow, steady weight loss  Even a small amount of weight loss can lower your risk of health problems    How to lose weight safely:  A safe and healthy way to lose weight is to eat fewer calories and get regular exercise  You can lose up about 1 pound a week by decreasing the number of calories you eat by 500 calories each day  Healthy meal plan for weight management:  A healthy meal plan includes a variety of foods, contains fewer calories, and helps you stay healthy  A healthy meal plan includes the following:  · Eat whole-grain foods more often  A healthy meal plan should contain fiber  Fiber is the part of grains, fruits, and vegetables that is not broken down by your body  Whole-grain foods are healthy and provide extra fiber in your diet  Some examples of whole-grain foods are whole-wheat breads and pastas, oatmeal, brown rice, and bulgur  · Eat a variety of vegetables every day  Include dark, leafy greens such as spinach, kale, danilo greens, and mustard greens  Eat yellow and orange vegetables such as carrots, sweet potatoes, and winter squash  · Eat a variety of fruits every day  Choose fresh or canned fruit (canned in its own juice or light syrup) instead of juice  Fruit juice has very little or no fiber  · Eat low-fat dairy foods  Drink fat-free (skim) milk or 1% milk  Eat fat-free yogurt and low-fat cottage cheese  Try low-fat cheeses such as mozzarella and other reduced-fat cheeses  · Choose meat and other protein foods that are low in fat  Choose beans or other legumes such as split peas or lentils  Choose fish, skinless poultry (chicken or turkey), or lean cuts of red meat (beef or pork)  Before you cook meat or poultry, cut off any visible fat  · Use less fat and oil  Try baking foods instead of frying them  Add less fat, such as margarine, sour cream, regular salad dressing and mayonnaise to foods  Eat fewer high-fat foods  Some examples of high-fat foods include french fries, doughnuts, ice cream, and cakes  · Eat fewer sweets  Limit foods and drinks that are high in sugar   This includes candy, cookies, regular soda, and sweetened drinks  Exercise:  Exercise at least 30 minutes per day on most days of the week  Some examples of exercise include walking, biking, dancing, and swimming  You can also fit in more physical activity by taking the stairs instead of the elevator or parking farther away from stores  Ask your healthcare provider about the best exercise plan for you  © Copyright [employer] 2018 Information is for End User's use only and may not be sold, redistributed or otherwise used for commercial purposes  All illustrations and images included in CareNotes® are the copyrighted property of [employer]  or [employer]      Subjective:      Patient ID: Ernestina Duverney is a 63 y o  male  Chief Complaint   Patient presents with    Medicare Wellness Visit    Follow-up    Virtual Regular Visit    Virtual Awv       HPI  Follow-up  [id number] w pt  Pt c/o inc cough  Afebrile, pulse ox=98% on home O2 , RR/HR wnl  Has upcoming stress test  Last EF on Echo 10/2020 approx 50%  Also following w pulm  Last hga1c in Jan  =8 1%  Following w Endocrine    The following portions of the patient's history were reviewed and updated as appropriate: allergies, current medications, past family history, past medical history, past social history, past surgical history and problem list     Review of Systems   Constitutional: Positive for fatigue  Negative for fever  Eyes:        Wears glasses   Respiratory: Positive for wheezing  HEREDIA   Cardiovascular: Positive for palpitations and leg swelling  Gastrointestinal:        GERD   Endocrine:        DM   Musculoskeletal: Positive for arthralgias and myalgias  Allergic/Immunologic: Positive for environmental allergies  Neurological: Positive for weakness  Psychiatric/Behavioral: Positive for sleep disturbance  The patient is nervous/anxious            Current Outpatient Medications   Medication Sig Dispense Refill    acetaminophen (TYLENOL) 325 mg tablet Take 2 tablets (650 mg total) by mouth every 6 (six) hours as needed for mild pain, headaches or fever 30 tablet 0    albuterol (2 5 mg/3 mL) 0 083 % nebulizer solution Take 2 5 mg by nebulization every 6 (six) hours as needed      ALPRAZolam (XANAX) 2 MG tablet Take 2 mg by mouth daily at bedtime       Ascorbic Acid (VITAMIN C) 1000 MG tablet Take 1,000 mg by mouth daily      aspirin 81 MG tablet Take 81 mg by mouth every morning        atorvastatin (LIPITOR) 80 mg tablet Take 1 tablet (80 mg total) by mouth daily 90 tablet 3    barium (VoLumen) 0 1 % SUSP Take by mouth once      busPIRone (BUSPAR) 10 mg tablet Take 10 mg by mouth 2 (two) times a day       cholecalciferol (VITAMIN D3) 1,000 units tablet Take 1 tablet (1,000 Units total) by mouth daily 90 tablet 3    clotrimazole-betamethasone (LOTRISONE) 1-0 05 % cream Apply topically 2 (two) times a day X 2 weeks 45 g 1    cyanocobalamin 1,000 mcg/mL Inject 1 mL (1,000 mcg total) into a muscle every 30 (thirty) days 10 mL 0    diclofenac sodium (Voltaren) 1 % Apply 2 g topically 2 (two) times a day as needed (For pain) 100 g 0    digoxin (LANOXIN) 0 25 mg tablet Take 1 tablet (250 mcg total) by mouth daily 90 tablet 3    Eliquis 5 MG Take 1 tablet (5 mg total) by mouth 2 (two) times a day 180 tablet 3    fluticasone-umeclidinium-vilanterol (TRELEGY ELLIPTA) 100-62 5-25 MCG/INH inhaler Inhale 1 puff daily Rinse mouth after use   2 Inhaler 0    gabapentin (NEURONTIN) 100 mg capsule TAKE 1 CAPSULE BY MOUTH THREE TIMES DAILY 90 capsule 10    insulin aspart (NovoLOG) 100 Units/mL injection pen Inject under the skin 35 units with meals 3 times a day and per sliding scale 25 pen 0    insulin glargine (Basaglar KwikPen) 100 units/mL injection pen Inject under the skin 72 units in the morning and at bedtime 105 mL 3    Insulin Pen Needle (EASY TOUCH PEN NEEDLES) 31G X 5 MM MISC Use 5 times a day with insulin 500 each 3    losartan (COZAAR) 50 mg tablet Take 1 tablet (50 mg total) by mouth daily 180 tablet 0    metFORMIN (GLUCOPHAGE-XR) 500 mg 24 hr tablet Take one tablet daily 90 tablet 0    metoprolol succinate (TOPROL-XL) 200 MG 24 hr tablet Take 1 tablet (200 mg total) by mouth daily 90 tablet 3    Multiple Vitamins-Minerals (CENTRUM SILVER 50+MEN PO) Take by mouth      mupirocin (BACTROBAN) 2 % ointment Apply topically 3 (three) times a day 22 g 0    omeprazole (PriLOSEC) 20 mg delayed release capsule TAKE 1 CAPSULE BY MOUTH DAILY 30 capsule 10    potassium chloride (K-DUR,KLOR-CON) 20 mEq tablet Take 1 tablet (20 mEq total) by mouth daily 90 tablet 3    QUEtiapine (SEROquel XR) 200 mg 24 hr tablet Take 200 mg by mouth every morning   1    QUEtiapine (SEROquel) 300 mg tablet Take 300 mg by mouth daily at bedtime      sertraline (ZOLOFT) 50 mg tablet Take 50 mg by mouth daily Daily at bedtime      spironolactone (ALDACTONE) 25 mg tablet Take 1 tablet (25 mg total) by mouth daily 90 tablet 2    torsemide (DEMADEX) 20 mg tablet Take 1 tablet (20 mg total) by mouth daily (Patient taking differently: Take 20 mg by mouth 2 (two) times a day ) 180 tablet 2    torsemide (DEMADEX) 20 mg tablet Take 2 tablets (40 mg total) by mouth daily Patient to take an additional tablet as needed  60 tablet 0    Vascepa 1 g CAPS TAKE 2 CAPSULES BY MOUTH TWICE DAILY 120 capsule 3    Victoza injection INJECT 0 3 ML (1 8 MG TOTAL) SUBCUTANEOUSLY DAILY (Patient taking differently: daily at bedtime 18 UNITS TOTAL) 24 mL 1     No current facility-administered medications for this visit  Objective:    /80 (BP Location: Left arm, Patient Position: Sitting, Cuff Size: Standard)   Pulse 85   Ht 5' 4" (1 626 m)   Wt 125 kg (275 lb)   SpO2 98%   BMI 47 20 kg/m²        Physical Exam  Vitals signs and nursing note reviewed  Constitutional:       General: He is not in acute distress  Appearance: He is obese     Pulmonary:      Effort: No respiratory distress  Neurological:      Mental Status: He is alert and oriented to person, place, and time     Psychiatric:         Mood and Affect: Mood normal               Mary Hooper MD

## 2021-03-01 DIAGNOSIS — E11.8 TYPE 2 DIABETES MELLITUS WITH COMPLICATION, WITH LONG-TERM CURRENT USE OF INSULIN (HCC): ICD-10-CM

## 2021-03-01 DIAGNOSIS — Z79.4 TYPE 2 DIABETES MELLITUS WITH COMPLICATION, WITH LONG-TERM CURRENT USE OF INSULIN (HCC): ICD-10-CM

## 2021-03-01 DIAGNOSIS — E11.65 TYPE 2 DIABETES MELLITUS WITH HYPERGLYCEMIA, WITH LONG-TERM CURRENT USE OF INSULIN (HCC): ICD-10-CM

## 2021-03-01 DIAGNOSIS — Z79.4 TYPE 2 DIABETES MELLITUS WITH HYPERGLYCEMIA, WITH LONG-TERM CURRENT USE OF INSULIN (HCC): ICD-10-CM

## 2021-03-01 RX ORDER — METFORMIN HYDROCHLORIDE 500 MG/1
TABLET, EXTENDED RELEASE ORAL
Qty: 90 TABLET | Refills: 0 | Status: SHIPPED | OUTPATIENT
Start: 2021-03-01 | End: 2021-03-19 | Stop reason: SDUPTHER

## 2021-03-01 RX ORDER — LIRAGLUTIDE 6 MG/ML
INJECTION SUBCUTANEOUS
Qty: 9 ML | Refills: 1 | Status: SHIPPED | OUTPATIENT
Start: 2021-03-01 | End: 2021-04-27

## 2021-03-10 DIAGNOSIS — Z23 ENCOUNTER FOR IMMUNIZATION: ICD-10-CM

## 2021-03-17 ENCOUNTER — TELEPHONE (OUTPATIENT)
Dept: OTHER | Facility: HOSPITAL | Age: 61
End: 2021-03-17

## 2021-03-18 ENCOUNTER — TELEPHONE (OUTPATIENT)
Dept: ENDOCRINOLOGY | Facility: CLINIC | Age: 61
End: 2021-03-18

## 2021-03-19 ENCOUNTER — TELEMEDICINE (OUTPATIENT)
Dept: ENDOCRINOLOGY | Facility: CLINIC | Age: 61
End: 2021-03-19
Payer: MEDICARE

## 2021-03-19 DIAGNOSIS — E66.2 CLASS 3 OBESITY WITH ALVEOLAR HYPOVENTILATION, SERIOUS COMORBIDITY, AND BODY MASS INDEX (BMI) OF 40.0 TO 44.9 IN ADULT (HCC): ICD-10-CM

## 2021-03-19 DIAGNOSIS — I10 BENIGN ESSENTIAL HYPERTENSION: ICD-10-CM

## 2021-03-19 DIAGNOSIS — I25.10 CORONARY ARTERY DISEASE INVOLVING NATIVE HEART, ANGINA PRESENCE UNSPECIFIED, UNSPECIFIED VESSEL OR LESION TYPE: ICD-10-CM

## 2021-03-19 DIAGNOSIS — E78.2 MIXED HYPERLIPIDEMIA: ICD-10-CM

## 2021-03-19 DIAGNOSIS — E11.65 TYPE 2 DIABETES MELLITUS WITH HYPERGLYCEMIA, WITH LONG-TERM CURRENT USE OF INSULIN (HCC): Primary | ICD-10-CM

## 2021-03-19 DIAGNOSIS — Z79.4 TYPE 2 DIABETES MELLITUS WITH HYPERGLYCEMIA, WITH LONG-TERM CURRENT USE OF INSULIN (HCC): Primary | ICD-10-CM

## 2021-03-19 DIAGNOSIS — J96.11 CHRONIC HYPOXEMIC RESPIRATORY FAILURE (HCC): ICD-10-CM

## 2021-03-19 PROCEDURE — 99214 OFFICE O/P EST MOD 30 MIN: CPT | Performed by: INTERNAL MEDICINE

## 2021-03-19 RX ORDER — METFORMIN HYDROCHLORIDE 500 MG/1
TABLET, EXTENDED RELEASE ORAL
Qty: 90 TABLET | Refills: 3 | Status: SHIPPED | OUTPATIENT
Start: 2021-03-19 | End: 2022-01-13
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
## 2021-03-26 DIAGNOSIS — I50.22 CHRONIC SYSTOLIC HEART FAILURE (HCC): ICD-10-CM

## 2021-03-26 RX ORDER — SPIRONOLACTONE 25 MG/1
TABLET ORAL
Qty: 30 TABLET | Refills: 2 | Status: SHIPPED | OUTPATIENT
Start: 2021-03-26 | End: 2021-07-09

## 2021-04-05 ENCOUNTER — APPOINTMENT (OUTPATIENT)
Dept: LAB | Facility: CLINIC | Age: 61
End: 2021-04-05
Payer: MEDICARE

## 2021-04-05 ENCOUNTER — TELEPHONE (OUTPATIENT)
Dept: FAMILY MEDICINE CLINIC | Facility: CLINIC | Age: 61
End: 2021-04-05

## 2021-04-05 DIAGNOSIS — I25.10 CORONARY ARTERY DISEASE INVOLVING NATIVE HEART, ANGINA PRESENCE UNSPECIFIED, UNSPECIFIED VESSEL OR LESION TYPE: ICD-10-CM

## 2021-04-05 DIAGNOSIS — J96.11 CHRONIC HYPOXEMIC RESPIRATORY FAILURE (HCC): ICD-10-CM

## 2021-04-05 DIAGNOSIS — E78.2 MIXED HYPERLIPIDEMIA: ICD-10-CM

## 2021-04-05 DIAGNOSIS — E11.65 TYPE 2 DIABETES MELLITUS WITH HYPERGLYCEMIA, WITH LONG-TERM CURRENT USE OF INSULIN (HCC): ICD-10-CM

## 2021-04-05 DIAGNOSIS — E66.2 CLASS 3 OBESITY WITH ALVEOLAR HYPOVENTILATION, SERIOUS COMORBIDITY, AND BODY MASS INDEX (BMI) OF 40.0 TO 44.9 IN ADULT (HCC): ICD-10-CM

## 2021-04-05 DIAGNOSIS — Z79.4 TYPE 2 DIABETES MELLITUS WITH HYPERGLYCEMIA, WITH LONG-TERM CURRENT USE OF INSULIN (HCC): ICD-10-CM

## 2021-04-05 DIAGNOSIS — I10 BENIGN ESSENTIAL HYPERTENSION: ICD-10-CM

## 2021-04-05 LAB
ALBUMIN SERPL BCP-MCNC: 3.8 G/DL (ref 3.5–5)
ALP SERPL-CCNC: 69 U/L (ref 46–116)
ALT SERPL W P-5'-P-CCNC: 63 U/L (ref 12–78)
ANION GAP SERPL CALCULATED.3IONS-SCNC: 10 MMOL/L (ref 4–13)
AST SERPL W P-5'-P-CCNC: 49 U/L (ref 5–45)
BILIRUB SERPL-MCNC: 0.51 MG/DL (ref 0.2–1)
BUN SERPL-MCNC: 20 MG/DL (ref 5–25)
CALCIUM SERPL-MCNC: 9.5 MG/DL (ref 8.3–10.1)
CHLORIDE SERPL-SCNC: 100 MMOL/L (ref 100–108)
CO2 SERPL-SCNC: 26 MMOL/L (ref 21–32)
CREAT SERPL-MCNC: 1.05 MG/DL (ref 0.6–1.3)
EST. AVERAGE GLUCOSE BLD GHB EST-MCNC: 180 MG/DL
GFR SERPL CREATININE-BSD FRML MDRD: 76 ML/MIN/1.73SQ M
GLUCOSE SERPL-MCNC: 175 MG/DL (ref 65–140)
HBA1C MFR BLD: 7.9 %
POTASSIUM SERPL-SCNC: 4.2 MMOL/L (ref 3.5–5.3)
PROT SERPL-MCNC: 7 G/DL (ref 6.4–8.2)
SODIUM SERPL-SCNC: 136 MMOL/L (ref 136–145)

## 2021-04-05 PROCEDURE — 83036 HEMOGLOBIN GLYCOSYLATED A1C: CPT

## 2021-04-05 PROCEDURE — 80053 COMPREHEN METABOLIC PANEL: CPT

## 2021-04-05 PROCEDURE — 36415 COLL VENOUS BLD VENIPUNCTURE: CPT

## 2021-04-05 NOTE — TELEPHONE ENCOUNTER
Advised pt to call tomorrow f geress and boil look worse, he says he can send a pic to you if you can receive it    He will call tomorrow if it is worse

## 2021-04-05 NOTE — TELEPHONE ENCOUNTER
Dr Dyer Branch:    MARGARITO:  Tuesday from [facility] called to let you know that patient is growing a boil on side of neck and also is developing abscess on stomach  She has him putting warm compresses on them and she will be drawing labwork today  She doesn't feel its infected  Offered virtual appointment she feels that it could wait and she will keep an eye on them and if they worsen he will schedule

## 2021-04-05 NOTE — TELEPHONE ENCOUNTER
Please inform pt to call tomorrow if looks any worse as I will be out of office after that for rest of wee and wan to make sure he is o--thanks

## 2021-04-06 ENCOUNTER — HOSPITAL ENCOUNTER (EMERGENCY)
Facility: HOSPITAL | Age: 61
Discharge: HOME | End: 2021-04-06
Attending: EMERGENCY MEDICINE | Admitting: EMERGENCY MEDICINE
Payer: MEDICARE

## 2021-04-06 ENCOUNTER — APPOINTMENT (EMERGENCY)
Dept: RADIOLOGY | Facility: HOSPITAL | Age: 61
End: 2021-04-06
Payer: MEDICARE

## 2021-04-06 ENCOUNTER — TELEPHONE (OUTPATIENT)
Dept: FAMILY MEDICINE CLINIC | Facility: CLINIC | Age: 61
End: 2021-04-06

## 2021-04-06 ENCOUNTER — TELEPHONE (OUTPATIENT)
Dept: ENDOCRINOLOGY | Facility: CLINIC | Age: 61
End: 2021-04-06

## 2021-04-06 VITALS
OXYGEN SATURATION: 97 % | WEIGHT: 274 LBS | DIASTOLIC BLOOD PRESSURE: 76 MMHG | RESPIRATION RATE: 20 BRPM | HEART RATE: 84 BPM | TEMPERATURE: 98.7 F | HEIGHT: 71 IN | BODY MASS INDEX: 38.36 KG/M2 | SYSTOLIC BLOOD PRESSURE: 140 MMHG

## 2021-04-06 DIAGNOSIS — T14.8XXA HEMATOMA: Primary | ICD-10-CM

## 2021-04-06 DIAGNOSIS — E11.65 UNCONTROLLED TYPE 2 DIABETES MELLITUS WITH HYPERGLYCEMIA (HCC): ICD-10-CM

## 2021-04-06 DIAGNOSIS — E11.8 TYPE 2 DIABETES MELLITUS WITH COMPLICATION, WITH LONG-TERM CURRENT USE OF INSULIN (HCC): ICD-10-CM

## 2021-04-06 DIAGNOSIS — L03.90 CELLULITIS: ICD-10-CM

## 2021-04-06 DIAGNOSIS — Z79.4 TYPE 2 DIABETES MELLITUS WITH COMPLICATION, WITH LONG-TERM CURRENT USE OF INSULIN (HCC): ICD-10-CM

## 2021-04-06 DIAGNOSIS — E11.42 DIABETIC POLYNEUROPATHY ASSOCIATED WITH TYPE 2 DIABETES MELLITUS (HCC): ICD-10-CM

## 2021-04-06 LAB
ALBUMIN SERPL BCP-MCNC: 4.1 G/DL (ref 3.5–5)
ALP SERPL-CCNC: 72 U/L (ref 46–116)
ALT SERPL W P-5'-P-CCNC: 77 U/L (ref 12–78)
ANION GAP SERPL CALCULATED.3IONS-SCNC: 8 MMOL/L (ref 4–13)
AST SERPL W P-5'-P-CCNC: 57 U/L (ref 5–45)
BASOPHILS # BLD AUTO: 0.04 THOUSANDS/ΜL (ref 0–0.1)
BASOPHILS NFR BLD AUTO: 0 % (ref 0–1)
BILIRUB SERPL-MCNC: 0.63 MG/DL (ref 0.2–1)
BUN SERPL-MCNC: 17 MG/DL (ref 5–25)
CALCIUM SERPL-MCNC: 10.1 MG/DL (ref 8.3–10.1)
CHLORIDE SERPL-SCNC: 96 MMOL/L (ref 100–108)
CO2 SERPL-SCNC: 29 MMOL/L (ref 21–32)
CREAT SERPL-MCNC: 0.96 MG/DL (ref 0.6–1.3)
EOSINOPHIL # BLD AUTO: 0.16 THOUSAND/ΜL (ref 0–0.61)
EOSINOPHIL NFR BLD AUTO: 1 % (ref 0–6)
ERYTHROCYTE [DISTWIDTH] IN BLOOD BY AUTOMATED COUNT: 12.3 % (ref 11.6–15.1)
GFR SERPL CREATININE-BSD FRML MDRD: 85 ML/MIN/1.73SQ M
GLUCOSE SERPL-MCNC: 97 MG/DL (ref 65–140)
HCT VFR BLD AUTO: 40.7 % (ref 36.5–49.3)
HGB BLD-MCNC: 14.1 G/DL (ref 12–17)
IMM GRANULOCYTES # BLD AUTO: 0.07 THOUSAND/UL (ref 0–0.2)
IMM GRANULOCYTES NFR BLD AUTO: 1 % (ref 0–2)
LYMPHOCYTES # BLD AUTO: 5.48 THOUSANDS/ΜL (ref 0.6–4.47)
LYMPHOCYTES NFR BLD AUTO: 46 % (ref 14–44)
MCH RBC QN AUTO: 32.4 PG (ref 26.8–34.3)
MCHC RBC AUTO-ENTMCNC: 34.6 G/DL (ref 31.4–37.4)
MCV RBC AUTO: 94 FL (ref 82–98)
MONOCYTES # BLD AUTO: 0.69 THOUSAND/ΜL (ref 0.17–1.22)
MONOCYTES NFR BLD AUTO: 6 % (ref 4–12)
NEUTROPHILS # BLD AUTO: 5.47 THOUSANDS/ΜL (ref 1.85–7.62)
NEUTS SEG NFR BLD AUTO: 46 % (ref 43–75)
NRBC BLD AUTO-RTO: 0 /100 WBCS
PLATELET # BLD AUTO: 149 THOUSANDS/UL (ref 149–390)
PMV BLD AUTO: 9.7 FL (ref 8.9–12.7)
POTASSIUM SERPL-SCNC: 4 MMOL/L (ref 3.5–5.3)
PROT SERPL-MCNC: 7.3 G/DL (ref 6.4–8.2)
RBC # BLD AUTO: 4.35 MILLION/UL (ref 3.88–5.62)
SODIUM SERPL-SCNC: 133 MMOL/L (ref 136–145)
WBC # BLD AUTO: 11.91 THOUSAND/UL (ref 4.31–10.16)

## 2021-04-06 PROCEDURE — 99284 EMERGENCY DEPT VISIT MOD MDM: CPT

## 2021-04-06 PROCEDURE — 80053 COMPREHEN METABOLIC PANEL: CPT | Performed by: EMERGENCY MEDICINE

## 2021-04-06 PROCEDURE — 74177 CT ABD & PELVIS W/CONTRAST: CPT

## 2021-04-06 PROCEDURE — 85025 COMPLETE CBC W/AUTO DIFF WBC: CPT | Performed by: EMERGENCY MEDICINE

## 2021-04-06 PROCEDURE — 36415 COLL VENOUS BLD VENIPUNCTURE: CPT | Performed by: EMERGENCY MEDICINE

## 2021-04-06 PROCEDURE — 99284 EMERGENCY DEPT VISIT MOD MDM: CPT | Performed by: EMERGENCY MEDICINE

## 2021-04-06 RX ORDER — PEN NEEDLE, DIABETIC 31 GX3/16"
NEEDLE, DISPOSABLE MISCELLANEOUS
Qty: 500 EACH | Refills: 3 | Status: SHIPPED | OUTPATIENT
Start: 2021-04-06 | End: 2021-04-06

## 2021-04-06 RX ORDER — CEPHALEXIN 500 MG/1
500 CAPSULE ORAL 3 TIMES DAILY
Qty: 21 CAPSULE | Refills: 0 | Status: SHIPPED | OUTPATIENT
Start: 2021-04-06 | End: 2021-04-13

## 2021-04-06 RX ORDER — CEPHALEXIN 500 MG/1
500 CAPSULE ORAL ONCE
Status: COMPLETED | OUTPATIENT
Start: 2021-04-06 | End: 2021-04-06

## 2021-04-06 RX ORDER — OXYCODONE HYDROCHLORIDE AND ACETAMINOPHEN 5; 325 MG/1; MG/1
1 TABLET ORAL ONCE
Status: COMPLETED | OUTPATIENT
Start: 2021-04-06 | End: 2021-04-06

## 2021-04-06 RX ADMIN — IOHEXOL 100 ML: 350 INJECTION, SOLUTION INTRAVENOUS at 19:17

## 2021-04-06 RX ADMIN — OXYCODONE HYDROCHLORIDE AND ACETAMINOPHEN 1 TABLET: 5; 325 TABLET ORAL at 20:15

## 2021-04-06 RX ADMIN — CEPHALEXIN 500 MG: 500 CAPSULE ORAL at 20:15
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
## 2021-04-06 NOTE — TELEPHONE ENCOUNTER
Completed certifying medicare request form for therapeutic footwear faxed along with last office note to [address] [facility]   Fax #: [phone]

## 2021-04-06 NOTE — TELEPHONE ENCOUNTER
Dr Emile Goltz,    The boil/abscess patient thinks is getting worse  He has pain inside his abdomin from the abscess       Please advise

## 2021-04-07 ENCOUNTER — VBI (OUTPATIENT)
Dept: FAMILY MEDICINE CLINIC | Facility: CLINIC | Age: 61
End: 2021-04-07

## 2021-04-07 NOTE — ED NOTES
Pt seen, assessed and d/c by provider  Pt appeared to be in no acute distress upon discharge  Pt able to ambulate well without assistance upon exiting        Porsche Garcia RN  04/06/21 2025

## 2021-04-07 NOTE — TELEPHONE ENCOUNTER
Left Message - PATIENT WENT TO THE ED FOR A BOIL ON HIS ABDOMEN 4/7 NEEDS  WOUND CHECK IN 3 DAYS      By Timothy Shannon MA

## 2021-04-08 NOTE — TELEPHONE ENCOUNTER
Kristi Dobbins    ED Visit Information     Ed visit date: 4/6/21  Diagnosis Description: ABCESS  In Network? Yes [facility]  Discharge status: Home  Discharged with meds ? Yes  Number of ED visits to date: 1  ED Severity:APPT SCHEDULED 4/14     Outreach Information    Outreach successful: Yes 1  Date letter mailed:N/A  Date [address] [place] Coordination    Follow up appointment with pcp: yes APPT 4/14  Transportation issues ?  No    Value Consolidated Joss

## 2021-04-09 ENCOUNTER — TELEPHONE (OUTPATIENT)
Dept: ENDOCRINOLOGY | Facility: CLINIC | Age: 61
End: 2021-04-09

## 2021-04-13 ENCOUNTER — TELEPHONE (OUTPATIENT)
Dept: ENDOCRINOLOGY | Facility: CLINIC | Age: 61
End: 2021-04-13

## 2021-04-13 NOTE — TELEPHONE ENCOUNTER
Marked variation blood sugars, hyperglycemic most of the day  Please ask patient if there have been any changes to his current medications, has he needed any steroid medication/ changes in his diet    During lost follow-up, blood sugars were fairly reasonable  If blood sugars continue to be high, we may need to consider insulin U500

## 2021-04-14 NOTE — TELEPHONE ENCOUNTER
Spoke with patient and reviewed bg log  He is not on any new medications (steroids)  He said it is his diet  Drinking apple juice and sweets  He will be more careful    Wanted you to know he was vaccinated ([employer])

## 2021-04-14 NOTE — TELEPHONE ENCOUNTER
Ok  With avoiding high carbohydrate foods, juice will plan to review blood sugars in another 2-3 week

## 2021-04-20 ENCOUNTER — TELEMEDICINE (OUTPATIENT)
Dept: FAMILY MEDICINE CLINIC | Facility: CLINIC | Age: 61
End: 2021-04-20
Payer: MEDICARE

## 2021-04-20 VITALS
WEIGHT: 271 LBS | OXYGEN SATURATION: 94 % | BODY MASS INDEX: 37.8 KG/M2 | SYSTOLIC BLOOD PRESSURE: 108 MMHG | DIASTOLIC BLOOD PRESSURE: 75 MMHG

## 2021-04-20 DIAGNOSIS — E78.5 HYPERLIPIDEMIA, UNSPECIFIED HYPERLIPIDEMIA TYPE: ICD-10-CM

## 2021-04-20 DIAGNOSIS — G47.33 OSA TREATED WITH BIPAP: ICD-10-CM

## 2021-04-20 DIAGNOSIS — K21.9 GASTROESOPHAGEAL REFLUX DISEASE WITHOUT ESOPHAGITIS: ICD-10-CM

## 2021-04-20 DIAGNOSIS — E66.9 OBESITY (BMI 30-39.9): ICD-10-CM

## 2021-04-20 DIAGNOSIS — I50.32 CHRONIC DIASTOLIC CONGESTIVE HEART FAILURE (HCC): Primary | ICD-10-CM

## 2021-04-20 DIAGNOSIS — E11.42 DIABETIC POLYNEUROPATHY ASSOCIATED WITH TYPE 2 DIABETES MELLITUS (HCC): ICD-10-CM

## 2021-04-20 DIAGNOSIS — I48.0 PAF (PAROXYSMAL ATRIAL FIBRILLATION) (HCC): ICD-10-CM

## 2021-04-20 PROCEDURE — 99213 OFFICE O/P EST LOW 20 MIN: CPT | Performed by: FAMILY MEDICINE

## 2021-04-23 NOTE — PROGRESS NOTES
Progress Note - Pulmonary   Anna Model 60 y o  male MRN: [id number]  Unit/Bed#: 2 [place] [address] Encounter: [id number]    Assessment:  Acute exacerbation of COPD with slight improvement  Complains of difficulty expectorating mucus  Patient not able to provide acceptable sputum specimen for culture  Nasal respiratory pathogen panel negative  Acute right costochondritis with ecchymosis right anterior chest wall due to cough  This has improved  Obstructive sleep apnea    Plan: Will order chest percussion vest to see if this helps patient mobilize any mucus secretions  Continue azithromycin p o  for total of 5 days  Continue methylprednisone 20 mg IV every 8 hr  Patient prefers to use BiPAP now 12/6 with 35% oxygen and place of his CPAP of 10 the uses with 2 L of oxygen at home  Subjective:   Cough is somewhat better but states he has difficulty expectorate secretions even with use of the Acapella valve    Objective:     Vitals: Blood pressure (!) 172/82, pulse 67, temperature 98 °F (36 7 °C), temperature source Oral, resp  rate 18, height 5' 11" (1 803 m), weight 132 kg (290 lb), SpO2 93 %  ,Body mass index is 40 45 kg/m²  Intake/Output Summary (Last 24 hours) at 11/04/18 1927  Last data filed at 11/04/18 1421   Gross per 24 hour   Intake                0 ml   Output             1850 ml   Net            -1850 ml       Physical Exam: Physical Exam   Constitutional: He is oriented to person, place, and time  He appears well-developed and well-nourished  No distress  On 3 l/m nc   HENT:   Head: Normocephalic  Nose: Nose normal    Mouth/Throat: Oropharynx is clear and moist  No oropharyngeal exudate  Eyes: Pupils are equal, round, and reactive to light  Conjunctivae are normal    Neck: Neck supple  No JVD present  No tracheal deviation present  Cardiovascular: Normal rate, regular rhythm and normal heart sounds  Pulmonary/Chest: Effort normal    Decreased breath sounds with some rhonchi bilaterally  [___] : [unfilled] Abdominal: Soft  He exhibits no distension  There is no tenderness  There is no guarding  Musculoskeletal: He exhibits no edema  Lymphadenopathy:     He has no cervical adenopathy  Neurological: He is alert and oriented to person, place, and time  Skin: Skin is warm and dry  No rash noted  Psychiatric: He has a normal mood and affect  His behavior is normal  Thought content normal         Labs: I have personally reviewed pertinent lab results  , ABG: Lab Results   Component Value Date    PHART 7 422 03/22/2017    ZTJ8PHQ 35 7 (L) 03/22/2017    PO2ART 77 7 03/22/2017    [id number] 22 7 03/22/2017    BEART -1 2 03/22/2017    SOURCE Brachial, Left 03/22/2017   , BNP: No results found for: BNP, CBC: Lab Results   Component Value Date    WBC 9 38 11/04/2018    HGB 13 5 11/04/2018    HCT 40 8 11/04/2018    MCV 93 11/04/2018     11/04/2018    ADJUSTEDWBC 8 60 09/14/2016    MCH 30 8 11/04/2018    MCHC 33 1 11/04/2018    RDW 12 2 11/04/2018    MPV 10 3 11/04/2018    NRBC 0 11/02/2018   , CMP: Lab Results   Component Value Date    K 4 7 11/04/2018    CL 96 (L) 11/04/2018    CO2 26 11/04/2018    BUN 33 (H) 11/04/2018    CREATININE 0 94 11/04/2018    CALCIUM 9 0 11/04/2018    AST 32 10/31/2018    ALT 55 10/31/2018    ALKPHOS 62 10/31/2018    EGFR 88 11/04/2018   , PT/INR:   Lab Results   Component Value Date    INR 0 98 10/31/2018   , Troponin: No results found for: TROPONIN    Imaging and other studies: I have personally reviewed pertinent reports     and I have personally reviewed pertinent films in PACS [LVEF ___%] : LVEF [unfilled]%

## 2021-04-26 DIAGNOSIS — E11.8 TYPE 2 DIABETES MELLITUS WITH COMPLICATION, WITH LONG-TERM CURRENT USE OF INSULIN (HCC): ICD-10-CM

## 2021-04-26 DIAGNOSIS — E11.65 UNCONTROLLED TYPE 2 DIABETES MELLITUS WITH HYPERGLYCEMIA (HCC): Chronic | ICD-10-CM

## 2021-04-26 DIAGNOSIS — Z79.4 TYPE 2 DIABETES MELLITUS WITH COMPLICATION, WITH LONG-TERM CURRENT USE OF INSULIN (HCC): ICD-10-CM

## 2021-04-26 DIAGNOSIS — I25.10 CORONARY ARTERY DISEASE INVOLVING NATIVE HEART WITHOUT ANGINA PECTORIS, UNSPECIFIED VESSEL OR LESION TYPE: ICD-10-CM

## 2021-04-26 RX ORDER — METOPROLOL SUCCINATE 200 MG/1
TABLET, EXTENDED RELEASE ORAL
Qty: 30 TABLET | Refills: 3 | Status: SHIPPED | OUTPATIENT
Start: 2021-04-26 | End: 2021-05-07 | Stop reason: SDUPTHER

## 2021-04-27 RX ORDER — LIRAGLUTIDE 6 MG/ML
INJECTION SUBCUTANEOUS
Qty: 9 ML | Refills: 1 | Status: SHIPPED | OUTPATIENT
Start: 2021-04-27 | End: 2021-07-07 | Stop reason: SDUPTHER

## 2021-04-27 RX ORDER — INSULIN ASPART 100 [IU]/ML
INJECTION, SOLUTION INTRAVENOUS; SUBCUTANEOUS
Qty: 30 ML | Refills: 3 | Status: SHIPPED | OUTPATIENT
Start: 2021-04-27 | End: 2021-07-07 | Stop reason: SDUPTHER

## 2021-04-29 ENCOUNTER — TELEPHONE (OUTPATIENT)
Dept: GASTROENTEROLOGY | Facility: CLINIC | Age: 61
End: 2021-04-29

## 2021-04-29 DIAGNOSIS — E11.8 TYPE 2 DIABETES MELLITUS WITH COMPLICATION, WITH LONG-TERM CURRENT USE OF INSULIN (HCC): Primary | ICD-10-CM

## 2021-04-29 DIAGNOSIS — Z79.4 TYPE 2 DIABETES MELLITUS WITH COMPLICATION, WITH LONG-TERM CURRENT USE OF INSULIN (HCC): Primary | ICD-10-CM

## 2021-04-29 NOTE — TELEPHONE ENCOUNTER
Pt lmom informing that he spoke to Anni Henriquez and they informed him that he can have covid testing the morning of his procedure  They will bring him in earlier so he is still on track for procedure next Wed

## 2021-04-30 NOTE — PRE-PROCEDURE INSTRUCTIONS
Pre-Surgery Instructions:   Medication Instructions    acetaminophen (TYLENOL) 325 mg tablet Patient was instructed by Physician and understands   albuterol (2 5 mg/3 mL) 0 083 % nebulizer solution Patient was instructed by Physician and understands   ALPRAZolam Guillermo Ayehire) 2 MG tablet Patient was instructed by Physician and understands   Ascorbic Acid (VITAMIN C) 1000 MG tablet Patient was instructed by Physician and understands   aspirin 81 MG tablet Patient was instructed by Physician and understands   atorvastatin (LIPITOR) 80 mg tablet Patient was instructed by Physician and understands   busPIRone (BUSPAR) 10 mg tablet Patient was instructed by Physician and understands   cholecalciferol (VITAMIN D3) 1,000 units tablet Patient was instructed by Physician and understands   diclofenac sodium (Voltaren) 1 % Patient was instructed by Physician and understands   digoxin (LANOXIN) 0 25 mg tablet Instructed patient per Anesthesia Guidelines   Eliquis 5 MG Patient was instructed by Physician and understands   fluticasone-umeclidinium-vilanterol (TRELEGY ELLIPTA) 100-62 5-25 MCG/INH inhaler Instructed patient per Anesthesia Guidelines   gabapentin (NEURONTIN) 100 mg capsule Patient was instructed by Physician and understands   insulin glargine (Basaglar KwikPen) 100 units/mL injection pen Instructed patient per Anesthesia Guidelines   Insulin Pen Needle 32G X 4 MM MISC Patient was instructed by Physician and understands   losartan (COZAAR) 50 mg tablet Patient was instructed by Physician and understands   metFORMIN (GLUCOPHAGE-XR) 500 mg 24 hr tablet Patient was instructed by Physician and understands   metoprolol succinate (TOPROL-XL) 200 MG 24 hr tablet Instructed patient per Anesthesia Guidelines   Multiple Vitamins-Minerals (CENTRUM SILVER 50+MEN PO) Patient was instructed by Physician and understands      NovoLOG FlexPen 100 units/mL injection pen Patient was instructed by Physician and understands   omeprazole (PriLOSEC) 20 mg delayed release capsule Patient was instructed by Physician and understands   potassium chloride (K-DUR,KLOR-CON) 20 mEq tablet Patient was instructed by Physician and understands   QUEtiapine (SEROquel XR) 200 mg 24 hr tablet Patient was instructed by Physician and understands   QUEtiapine (SEROquel) 300 mg tablet Patient was instructed by Physician and understands   sertraline (ZOLOFT) 50 mg tablet Patient was instructed by Physician and understands   spironolactone (ALDACTONE) 25 mg tablet Patient was instructed by Physician and understands   torsemide (DEMADEX) 20 mg tablet Patient was instructed by Physician and understands   Vascepa 1 g CAPS Patient was instructed by Physician and understands   Victoza injection Patient was instructed by Physician and understands  LD eliquis 05/02/21 evening dose

## 2021-05-04 ENCOUNTER — ANESTHESIA EVENT (OUTPATIENT)
Dept: GASTROENTEROLOGY | Facility: AMBULARY SURGERY CENTER | Age: 61
End: 2021-05-04

## 2021-05-05 ENCOUNTER — ANESTHESIA (OUTPATIENT)
Dept: GASTROENTEROLOGY | Facility: AMBULARY SURGERY CENTER | Age: 61
End: 2021-05-05

## 2021-05-05 ENCOUNTER — HOSPITAL ENCOUNTER (OUTPATIENT)
Dept: GASTROENTEROLOGY | Facility: AMBULARY SURGERY CENTER | Age: 61
Setting detail: OUTPATIENT SURGERY
Discharge: HOME | End: 2021-05-05
Attending: INTERNAL MEDICINE | Admitting: INTERNAL MEDICINE
Payer: MEDICARE

## 2021-05-05 VITALS
HEIGHT: 71 IN | HEART RATE: 77 BPM | TEMPERATURE: 95.8 F | SYSTOLIC BLOOD PRESSURE: 130 MMHG | DIASTOLIC BLOOD PRESSURE: 74 MMHG | OXYGEN SATURATION: 97 % | WEIGHT: 273 LBS | RESPIRATION RATE: 18 BRPM | BODY MASS INDEX: 38.22 KG/M2

## 2021-05-05 DIAGNOSIS — K21.9 GASTROESOPHAGEAL REFLUX DISEASE WITHOUT ESOPHAGITIS: ICD-10-CM

## 2021-05-05 LAB
GLUCOSE SERPL-MCNC: 285 MG/DL (ref 65–140)
SARS-COV-2 RNA RESP QL NAA+PROBE: NEGATIVE

## 2021-05-05 PROCEDURE — 43239 EGD BIOPSY SINGLE/MULTIPLE: CPT | Performed by: INTERNAL MEDICINE

## 2021-05-05 PROCEDURE — 88305 TISSUE EXAM BY PATHOLOGIST: CPT | Performed by: PATHOLOGY

## 2021-05-05 PROCEDURE — U0003 INFECTIOUS AGENT DETECTION BY NUCLEIC ACID (DNA OR RNA); SEVERE ACUTE RESPIRATORY SYNDROME CORONAVIRUS 2 (SARS-COV-2) (CORONAVIRUS DISEASE [COVID-19]), AMPLIFIED PROBE TECHNIQUE, MAKING USE OF HIGH THROUGHPUT TECHNOLOGIES AS DESCRIBED BY CMS-2020-01-R: HCPCS | Performed by: INTERNAL MEDICINE

## 2021-05-05 PROCEDURE — 82948 REAGENT STRIP/BLOOD GLUCOSE: CPT

## 2021-05-05 PROCEDURE — U0005 INFEC AGEN DETEC AMPLI PROBE: HCPCS | Performed by: INTERNAL MEDICINE

## 2021-05-05 RX ORDER — PROPOFOL 10 MG/ML
INJECTION, EMULSION INTRAVENOUS CONTINUOUS PRN
Status: DISCONTINUED | OUTPATIENT
Start: 2021-05-05 | End: 2021-05-05

## 2021-05-05 RX ORDER — PROPOFOL 10 MG/ML
INJECTION, EMULSION INTRAVENOUS AS NEEDED
Status: DISCONTINUED | OUTPATIENT
Start: 2021-05-05 | End: 2021-05-05

## 2021-05-05 RX ORDER — SODIUM CHLORIDE, SODIUM LACTATE, POTASSIUM CHLORIDE, CALCIUM CHLORIDE 600; 310; 30; 20 MG/100ML; MG/100ML; MG/100ML; MG/100ML
125 INJECTION, SOLUTION INTRAVENOUS CONTINUOUS
Status: DISCONTINUED | OUTPATIENT
Start: 2021-05-05 | End: 2021-05-09 | Stop reason: HOSPADM

## 2021-05-05 RX ORDER — LIDOCAINE HYDROCHLORIDE 10 MG/ML
INJECTION, SOLUTION EPIDURAL; INFILTRATION; INTRACAUDAL; PERINEURAL AS NEEDED
Status: DISCONTINUED | OUTPATIENT
Start: 2021-05-05 | End: 2021-05-05

## 2021-05-05 RX ADMIN — SODIUM CHLORIDE, SODIUM LACTATE, POTASSIUM CHLORIDE, AND CALCIUM CHLORIDE 125 ML/HR: .6; .31; .03; .02 INJECTION, SOLUTION INTRAVENOUS at 11:24

## 2021-05-05 RX ADMIN — PROPOFOL 50 MG: 10 INJECTION, EMULSION INTRAVENOUS at 11:54

## 2021-05-05 RX ADMIN — PROPOFOL 50 MG: 10 INJECTION, EMULSION INTRAVENOUS at 11:55

## 2021-05-05 RX ADMIN — LIDOCAINE HYDROCHLORIDE 50 MG: 10 INJECTION, SOLUTION EPIDURAL; INFILTRATION; INTRACAUDAL; PERINEURAL at 11:54

## 2021-05-05 RX ADMIN — PROPOFOL 100 MCG/KG/MIN: 10 INJECTION, EMULSION INTRAVENOUS at 11:56

## 2021-05-05 NOTE — ANESTHESIA PREPROCEDURE EVALUATION
Procedure:  EGD    Relevant Problems   CARDIO   (+) Benign essential hypertension   (+) Chest pain   (+) Chronic a-fib (HCC)   (+) Chronic congestive heart failure (HCC)   (+) Coronary artery disease involving native heart   (+) Hyperlipidemia   (+) PAF (paroxysmal atrial fibrillation) (HCC)      ENDO   (+) Type 2 diabetes mellitus with complication, with long-term current use of insulin (HCC)      GI/HEPATIC   (+) Esophageal reflux      /RENAL   (+) Acute renal failure superimposed on stage 2 chronic kidney disease (HCC)   (+) Stage 2 chronic kidney disease      HEMATOLOGY   (+) Nutritional anemia, unspecified       MUSCULOSKELETAL   (+) Back pain with radiation      NEURO/PSYCH   (+) Diabetic neuropathy ([facility])   (+) H/O vitamin D deficiency   (+) Panic disorder without agoraphobia      PULMONARY   (+) COPD (chronic obstructive pulmonary disease) ([facility])   (+) COPD exacerbation ([facility])   (+) Chronic respiratory failure with hypoxia ([facility])   (+) SHAVONNE and COPD overlap syndrome ([facility])   (+) SHAVONNE treated with BiPAP        Physical Exam    Airway    Mallampati score: II  TM Distance: >3 FB  Neck ROM: full     Dental   upper dentures and lower dentures,     Cardiovascular  Rhythm: irregular, Rate: abnormal,     Pulmonary  Breath sounds clear to auscultation, Decreased breath sounds,     Other Findings  EF 65%      Anesthesia Plan  ASA Score- 4     Anesthesia Type- IV sedation with anesthesia with ASA Monitors  Additional Monitors:   Airway Plan:           Plan Factors-Exercise tolerance (METS): >4 METS  Chart reviewed  EKG reviewed  Existing labs reviewed  Patient summary reviewed  Patient is not a current smoker  Induction- intravenous  Postoperative Plan- Plan for postoperative opioid use  Informed Consent- Anesthetic plan and risks discussed with patient  I personally reviewed this patient with the CRNA  Discussed and agreed on the Anesthesia Plan with the CRNA  Shaniqua Feng

## 2021-05-05 NOTE — ANESTHESIA POSTPROCEDURE EVALUATION
Post-Op Assessment Note    CV Status:  Stable  Pain Score: 0    Pain management: adequate     Mental Status:  Awake   Hydration Status:  Stable   PONV Controlled:  None   Airway Patency:  Patent      Post Op Vitals Reviewed: Yes      Staff: CRNA   Comments: spontaneously breathing, HOB @ 30 degrees, vss, simple mask to O2, fully endorsed to recovery w/o AC        No complications documented      BP      Temp     Pulse     Resp      SpO2   99
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
## 2021-05-07 ENCOUNTER — TELEPHONE (OUTPATIENT)
Dept: CARDIOLOGY CLINIC | Facility: CLINIC | Age: 61
End: 2021-05-07

## 2021-05-07 ENCOUNTER — TELEPHONE (OUTPATIENT)
Dept: ENDOCRINOLOGY | Facility: CLINIC | Age: 61
End: 2021-05-07

## 2021-05-07 DIAGNOSIS — I25.10 CORONARY ARTERY DISEASE INVOLVING NATIVE HEART WITHOUT ANGINA PECTORIS, UNSPECIFIED VESSEL OR LESION TYPE: ICD-10-CM

## 2021-05-07 DIAGNOSIS — E78.5 DYSLIPIDEMIA: ICD-10-CM

## 2021-05-07 DIAGNOSIS — I50.43 ACUTE ON CHRONIC COMBINED SYSTOLIC AND DIASTOLIC HEART FAILURE (HCC): ICD-10-CM

## 2021-05-07 DIAGNOSIS — I50.22 CHRONIC SYSTOLIC HEART FAILURE (HCC): ICD-10-CM

## 2021-05-07 RX ORDER — LOSARTAN POTASSIUM 50 MG/1
50 TABLET ORAL DAILY
Qty: 90 TABLET | Refills: 3 | Status: SHIPPED | OUTPATIENT
Start: 2021-05-07 | End: 2022-01-12

## 2021-05-07 RX ORDER — TORSEMIDE 20 MG/1
20 TABLET ORAL 2 TIMES DAILY
Qty: 180 TABLET | Refills: 3 | Status: SHIPPED | OUTPATIENT
Start: 2021-05-07 | End: 2021-09-08 | Stop reason: SDUPTHER

## 2021-05-07 RX ORDER — ICOSAPENT ETHYL 1000 MG/1
2 CAPSULE ORAL 2 TIMES DAILY
Qty: 360 CAPSULE | Refills: 3 | Status: SHIPPED | OUTPATIENT
Start: 2021-05-07 | End: 2022-02-11

## 2021-05-07 RX ORDER — METOPROLOL SUCCINATE 200 MG/1
200 TABLET, EXTENDED RELEASE ORAL DAILY
Qty: 90 TABLET | Refills: 3 | Status: SHIPPED | OUTPATIENT
Start: 2021-05-07 | End: 2022-03-14

## 2021-05-10 ENCOUNTER — TELEPHONE (OUTPATIENT)
Dept: GASTROENTEROLOGY | Facility: CLINIC | Age: 61
End: 2021-05-10

## 2021-05-10 DIAGNOSIS — K31.84 GASTROPARESIS: Primary | ICD-10-CM

## 2021-05-10 NOTE — LETTER
Cardiology Pre Operative Clearance      PRE OPERATIVE CARDIAC RISK ASSESSMENT    05/12/21    Edmondson Mike  1960  [id number]    Date of Surgery: 6/9/2021    Type of Surgery: colonoscopy/egd    Surgeon: Dr Cierra Mo    {SLA AMB CARDIAC CONTRAINDICATIONS:13243}    Anticoagulation: {RESPONSE; YES/NO/NA:19980}    Physician Comment: ***    Electronically Signed: ***

## 2021-05-10 NOTE — TELEPHONE ENCOUNTER
----- Message from Porsha Avila LPN sent at 8/07/7404 10:18 AM EDT -----  Patient aware  Educated  Please call patient to schedule colonoscopy and patient states Dr Mila Winkler wants to also repeat Egd  Please confirm that  Also schedule f/u appt to procedures and gastric emptying study   Thank you

## 2021-05-10 NOTE — TELEPHONE ENCOUNTER
Please advise if patient is to have a repeat EGD along with his colonoscopy for lt lower quadrant abdominal pain  He told the nurse he was to repeat the EGD as well  Thank you!

## 2021-05-11 ENCOUNTER — PREP FOR PROCEDURE (OUTPATIENT)
Dept: GASTROENTEROLOGY | Facility: CLINIC | Age: 61
End: 2021-05-11

## 2021-05-11 DIAGNOSIS — K21.9 GASTROESOPHAGEAL REFLUX DISEASE WITHOUT ESOPHAGITIS: ICD-10-CM

## 2021-05-11 DIAGNOSIS — R10.32 LEFT LOWER QUADRANT ABDOMINAL PAIN: Primary | ICD-10-CM

## 2021-05-11 NOTE — TELEPHONE ENCOUNTER
Called and scheduled for 6/9 at [facility]  Sent clearance to Dr Melita Lora to get permission to stop Eliquis 1 day prior to his procedure with Dr Melo Ou  Will call in a week if not received back

## 2021-05-12 ENCOUNTER — TELEPHONE (OUTPATIENT)
Dept: PULMONOLOGY | Facility: CLINIC | Age: 61
End: 2021-05-12

## 2021-05-12 NOTE — TELEPHONE ENCOUNTER
Patient LM saying he needs Bipap supplies ordered and sent to [employer] and then said "Just call me, I need to talk to you"

## 2021-05-14 DIAGNOSIS — J44.9 CHRONIC OBSTRUCTIVE PULMONARY DISEASE, UNSPECIFIED COPD TYPE (HCC): Primary | ICD-10-CM

## 2021-05-14 RX ORDER — ALBUTEROL SULFATE 2.5 MG/3ML
2.5 SOLUTION RESPIRATORY (INHALATION) EVERY 6 HOURS PRN
Qty: 360 ML | Refills: 5 | Status: SHIPPED | OUTPATIENT
Start: 2021-05-14

## 2021-05-18 ENCOUNTER — TELEPHONE (OUTPATIENT)
Dept: CARDIOLOGY CLINIC | Facility: CLINIC | Age: 61
End: 2021-05-18

## 2021-05-18 NOTE — TELEPHONE ENCOUNTER
[facility] CALLED FOR A BLOOD THINNER HOLD ON HIS PROCEDURE ON 6/9  PLEASE ENTER A CLEARANCE OR MED HOLD FOR THIS PATIENT

## 2021-05-18 NOTE — TELEPHONE ENCOUNTER
Called and spoke with Hugh Etienne at Dr Costa Hernandez office  She didn't see the fax  Asked if I could fax again to [phone]  Dr Raghu Poole is in the office today and she will get him to sign  Will f/u in a week if I don't receive back

## 2021-05-18 NOTE — TELEPHONE ENCOUNTER
5/17  Pt hr 93    Visiting nurse Tuesday called concerned about pt being in constant afib  She states heart hr has been all over and is wondering if they should add another metoprolol at night

## 2021-05-18 NOTE — TELEPHONE ENCOUNTER
Rec'd faxed request to hold med  Placed on Dr Geno Franco desk for signature  Will fax once completed

## 2021-05-24 ENCOUNTER — TELEMEDICINE (OUTPATIENT)
Dept: ENDOCRINOLOGY | Facility: CLINIC | Age: 61
End: 2021-05-24
Payer: MEDICARE

## 2021-05-24 DIAGNOSIS — E78.2 MIXED HYPERLIPIDEMIA: ICD-10-CM

## 2021-05-24 DIAGNOSIS — I25.10 CORONARY ARTERY DISEASE INVOLVING NATIVE HEART WITHOUT ANGINA PECTORIS, UNSPECIFIED VESSEL OR LESION TYPE: ICD-10-CM

## 2021-05-24 DIAGNOSIS — G47.33 OSA AND COPD OVERLAP SYNDROME (HCC): ICD-10-CM

## 2021-05-24 DIAGNOSIS — Z79.4 TYPE 2 DIABETES MELLITUS WITH HYPERGLYCEMIA, WITH LONG-TERM CURRENT USE OF INSULIN (HCC): ICD-10-CM

## 2021-05-24 DIAGNOSIS — J44.9 OSA AND COPD OVERLAP SYNDROME (HCC): ICD-10-CM

## 2021-05-24 DIAGNOSIS — E11.65 UNCONTROLLED TYPE 2 DIABETES MELLITUS WITH HYPERGLYCEMIA (HCC): ICD-10-CM

## 2021-05-24 DIAGNOSIS — I10 BENIGN ESSENTIAL HYPERTENSION: ICD-10-CM

## 2021-05-24 DIAGNOSIS — Z79.4 TYPE 2 DIABETES MELLITUS WITH COMPLICATION, WITH LONG-TERM CURRENT USE OF INSULIN (HCC): Primary | ICD-10-CM

## 2021-05-24 DIAGNOSIS — E11.8 TYPE 2 DIABETES MELLITUS WITH COMPLICATION, WITH LONG-TERM CURRENT USE OF INSULIN (HCC): Primary | ICD-10-CM

## 2021-05-24 DIAGNOSIS — E11.65 TYPE 2 DIABETES MELLITUS WITH HYPERGLYCEMIA, WITH LONG-TERM CURRENT USE OF INSULIN (HCC): ICD-10-CM

## 2021-05-24 PROCEDURE — 99214 OFFICE O/P EST MOD 30 MIN: CPT | Performed by: INTERNAL MEDICINE

## 2021-05-24 RX ORDER — INSULIN GLARGINE 100 [IU]/ML
INJECTION, SOLUTION SUBCUTANEOUS
Qty: 105 ML | Refills: 0
Start: 2021-05-24 | End: 2021-07-07 | Stop reason: SDUPTHER
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
## 2021-05-25 ENCOUNTER — TELEPHONE (OUTPATIENT)
Dept: ENDOCRINOLOGY | Facility: CLINIC | Age: 61
End: 2021-05-25

## 2021-05-25 ENCOUNTER — TELEPHONE (OUTPATIENT)
Dept: CARDIOLOGY CLINIC | Facility: CLINIC | Age: 61
End: 2021-05-25

## 2021-05-25 NOTE — TELEPHONE ENCOUNTER
Freestyle kamran report 5/10-5/23 already reviewed, discussed with patient during his appointment yesterday    Has already been scanned into chart

## 2021-05-25 NOTE — TELEPHONE ENCOUNTER
Called and spoke with Vish Neighbours at Dr Debbie Wong office  There is a note under letters in the chart regarding cardiac clearance however it is not filled out  She will send to him, he is in the office today and have him fill out  I will f/u on this next week if I don't get it back

## 2021-05-27 NOTE — TELEPHONE ENCOUNTER
Received Eliquis clearance from Dr Betina Segovia  Pt may hold Eliquis 3 days prior  It is not checked off however, if pt is cleared and there is a note that states patient has poor conditioning and multiple risk factors  Faxed to [employer]

## 2021-05-27 NOTE — TELEPHONE ENCOUNTER
Called and spoke with patient  Informed him he can hold his Eliquis 3 days prior to his procedure on June 9  [

## 2021-06-02 ENCOUNTER — TELEPHONE (OUTPATIENT)
Dept: GASTROENTEROLOGY | Facility: CLINIC | Age: 61
End: 2021-06-02

## 2021-06-02 ENCOUNTER — TELEPHONE (OUTPATIENT)
Dept: PREADMISSION TESTING | Facility: HOSPITAL | Age: 61
End: 2021-06-02

## 2021-06-02 NOTE — TELEPHONE ENCOUNTER
Patient had left message asking for a call regarding his covid test for his procedure on 6/9 at [place] [address]  I returned his call and he has difficulty finding a ride to get covid done for his flip procedure  He tried to get a hold of [place] [address] yesterday but was unsuccessful  I called and left message for Bola Resendiz, nurse at [place] [address] to call this patient regarding the covid test  He said they did right before procedure the last time  Left her a message to call him

## 2021-06-02 NOTE — PRE-PROCEDURE INSTRUCTIONS
Pre-Surgery Instructions:   Medication Instructions    acetaminophen (TYLENOL) 325 mg tablet Patient was instructed by Physician and understands   albuterol (2 5 mg/3 mL) 0 083 % nebulizer solution Patient was instructed by Physician and understands   ALPRAZolam Alysha Remigio) 2 MG tablet Patient was instructed by Physician and understands   Ascorbic Acid (VITAMIN C) 1000 MG tablet Patient was instructed by Physician and understands   aspirin 81 MG tablet Patient was instructed by Physician and understands   atorvastatin (LIPITOR) 80 mg tablet Patient was instructed by Physician and understands   busPIRone (BUSPAR) 10 mg tablet Patient was instructed by Physician and understands   cholecalciferol (VITAMIN D3) 1,000 units tablet Patient was instructed by Physician and understands   diclofenac sodium (Voltaren) 1 % Patient was instructed by Physician and understands   digoxin (LANOXIN) 0 25 mg tablet Instructed patient per Anesthesia Guidelines   Eliquis 5 MG Patient was instructed by Physician and understands   fluticasone-umeclidinium-vilanterol (TRELEGY ELLIPTA) 100-62 5-25 MCG/INH inhaler Instructed patient per Anesthesia Guidelines   gabapentin (NEURONTIN) 100 mg capsule Patient was instructed by Physician and understands   insulin glargine (Basaglar KwikPen) 100 units/mL injection pen Instructed patient per Anesthesia Guidelines   Insulin Pen Needle 32G X 4 MM MISC Patient was instructed by Physician and understands   losartan (COZAAR) 50 mg tablet Patient was instructed by Physician and understands   metFORMIN (GLUCOPHAGE-XR) 500 mg 24 hr tablet Patient was instructed by Physician and understands   metoprolol succinate (TOPROL-XL) 200 MG 24 hr tablet Instructed patient per Anesthesia Guidelines   Multiple Vitamins-Minerals (CENTRUM SILVER 50+MEN PO) Patient was instructed by Physician and understands      NovoLOG FlexPen 100 units/mL injection pen Patient was instructed by Physician and understands   omeprazole (PriLOSEC) 20 mg delayed release capsule Patient was instructed by Physician and understands   potassium chloride (K-DUR,KLOR-CON) 20 mEq tablet Patient was instructed by Physician and understands   QUEtiapine (SEROquel XR) 200 mg 24 hr tablet Patient was instructed by Physician and understands   QUEtiapine (SEROquel) 300 mg tablet Patient was instructed by Physician and understands   sertraline (ZOLOFT) 50 mg tablet Patient was instructed by Physician and understands   spironolactone (ALDACTONE) 25 mg tablet Patient was instructed by Physician and understands   torsemide (DEMADEX) 20 mg tablet Patient was instructed by Physician and understands   Vascepa 1 g CAPS Patient was instructed by Physician and understands   Victoza injection Patient was instructed by Physician and understands     Ld eliquis 06/05/21

## 2021-06-04 ENCOUNTER — HOSPITAL ENCOUNTER (OUTPATIENT)
Dept: RADIOLOGY | Facility: HOSPITAL | Age: 61
Discharge: HOME | End: 2021-06-04
Attending: INTERNAL MEDICINE
Payer: MEDICARE

## 2021-06-04 DIAGNOSIS — K31.84 GASTROPARESIS: ICD-10-CM

## 2021-06-04 PROCEDURE — 78264 GASTRIC EMPTYING IMG STUDY: CPT

## 2021-06-04 PROCEDURE — A9541 TC99M SULFUR COLLOID: HCPCS

## 2021-06-04 PROCEDURE — G1004 CDSM NDSC: HCPCS

## 2021-06-07 ENCOUNTER — TELEPHONE (OUTPATIENT)
Dept: GASTROENTEROLOGY | Facility: CLINIC | Age: 61
End: 2021-06-07

## 2021-06-07 NOTE — TELEPHONE ENCOUNTER
Pt is scheduled tomorrow for procedures with Dr Gwendalyn Rinne  Will call pt on Wed 6/9/21 to schedule f/u to both [facility] and [facility]

## 2021-06-07 NOTE — TELEPHONE ENCOUNTER
----- Message from Divine Palmer MD sent at 6/4/2021  4:19 PM EDT -----  Please call patient, schedule for office visit to discuss about gastric emptying study

## 2021-06-08 ENCOUNTER — TELEPHONE (OUTPATIENT)
Dept: SURGERY | Facility: HOSPITAL | Age: 61
End: 2021-06-08

## 2021-06-09 ENCOUNTER — ANESTHESIA EVENT (OUTPATIENT)
Dept: PERIOP | Facility: HOSPITAL | Age: 61
End: 2021-06-09

## 2021-06-09 ENCOUNTER — HOSPITAL ENCOUNTER (OUTPATIENT)
Dept: PERIOP | Facility: HOSPITAL | Age: 61
Setting detail: OUTPATIENT SURGERY
Discharge: HOME | End: 2021-06-09
Attending: INTERNAL MEDICINE
Payer: MEDICARE

## 2021-06-09 ENCOUNTER — ANESTHESIA (OUTPATIENT)
Dept: PERIOP | Facility: HOSPITAL | Age: 61
End: 2021-06-09

## 2021-06-09 VITALS
HEIGHT: 71 IN | SYSTOLIC BLOOD PRESSURE: 130 MMHG | TEMPERATURE: 96.2 F | BODY MASS INDEX: 38.78 KG/M2 | HEART RATE: 75 BPM | RESPIRATION RATE: 20 BRPM | DIASTOLIC BLOOD PRESSURE: 62 MMHG | WEIGHT: 277 LBS | OXYGEN SATURATION: 96 %

## 2021-06-09 DIAGNOSIS — K21.9 GASTROESOPHAGEAL REFLUX DISEASE WITHOUT ESOPHAGITIS: ICD-10-CM

## 2021-06-09 DIAGNOSIS — K31.84 GASTROPARESIS: Primary | ICD-10-CM

## 2021-06-09 DIAGNOSIS — R10.32 LEFT LOWER QUADRANT ABDOMINAL PAIN: ICD-10-CM

## 2021-06-09 LAB — SARS-COV-2 RNA RESP QL NAA+PROBE: NEGATIVE

## 2021-06-09 PROCEDURE — U0005 INFEC AGEN DETEC AMPLI PROBE: HCPCS | Performed by: INTERNAL MEDICINE

## 2021-06-09 PROCEDURE — 88305 TISSUE EXAM BY PATHOLOGIST: CPT | Performed by: PATHOLOGY

## 2021-06-09 PROCEDURE — U0003 INFECTIOUS AGENT DETECTION BY NUCLEIC ACID (DNA OR RNA); SEVERE ACUTE RESPIRATORY SYNDROME CORONAVIRUS 2 (SARS-COV-2) (CORONAVIRUS DISEASE [COVID-19]), AMPLIFIED PROBE TECHNIQUE, MAKING USE OF HIGH THROUGHPUT TECHNOLOGIES AS DESCRIBED BY CMS-2020-01-R: HCPCS | Performed by: INTERNAL MEDICINE

## 2021-06-09 PROCEDURE — 45385 COLONOSCOPY W/LESION REMOVAL: CPT | Performed by: INTERNAL MEDICINE

## 2021-06-09 PROCEDURE — 43239 EGD BIOPSY SINGLE/MULTIPLE: CPT | Performed by: INTERNAL MEDICINE

## 2021-06-09 RX ORDER — METOCLOPRAMIDE 5 MG/1
5 TABLET ORAL
Qty: 60 TABLET | Refills: 1 | Status: SHIPPED | OUTPATIENT
Start: 2021-06-09 | End: 2021-11-29 | Stop reason: HOSPADM

## 2021-06-09 RX ORDER — SODIUM CHLORIDE, SODIUM LACTATE, POTASSIUM CHLORIDE, CALCIUM CHLORIDE 600; 310; 30; 20 MG/100ML; MG/100ML; MG/100ML; MG/100ML
INJECTION, SOLUTION INTRAVENOUS CONTINUOUS PRN
Status: DISCONTINUED | OUTPATIENT
Start: 2021-06-09 | End: 2021-06-09

## 2021-06-09 RX ORDER — SODIUM CHLORIDE, SODIUM LACTATE, POTASSIUM CHLORIDE, CALCIUM CHLORIDE 600; 310; 30; 20 MG/100ML; MG/100ML; MG/100ML; MG/100ML
100 INJECTION, SOLUTION INTRAVENOUS CONTINUOUS
Status: CANCELLED | OUTPATIENT
Start: 2021-06-09

## 2021-06-09 RX ORDER — PROPOFOL 10 MG/ML
INJECTION, EMULSION INTRAVENOUS AS NEEDED
Status: DISCONTINUED | OUTPATIENT
Start: 2021-06-09 | End: 2021-06-09

## 2021-06-09 RX ORDER — ONDANSETRON 2 MG/ML
4 INJECTION INTRAMUSCULAR; INTRAVENOUS ONCE AS NEEDED
Status: CANCELLED | OUTPATIENT
Start: 2021-06-09

## 2021-06-09 RX ORDER — DICYCLOMINE HCL 20 MG
20 TABLET ORAL EVERY 6 HOURS
Qty: 60 TABLET | Refills: 1 | Status: SHIPPED | OUTPATIENT
Start: 2021-06-09 | End: 2021-11-29 | Stop reason: HOSPADM

## 2021-06-09 RX ADMIN — PROPOFOL 150 MG: 10 INJECTION, EMULSION INTRAVENOUS at 11:40

## 2021-06-09 RX ADMIN — PROPOFOL 30 MG: 10 INJECTION, EMULSION INTRAVENOUS at 11:52

## 2021-06-09 RX ADMIN — PROPOFOL 30 MG: 10 INJECTION, EMULSION INTRAVENOUS at 11:49

## 2021-06-09 RX ADMIN — PROPOFOL 30 MG: 10 INJECTION, EMULSION INTRAVENOUS at 12:01

## 2021-06-09 RX ADMIN — PROPOFOL 30 MG: 10 INJECTION, EMULSION INTRAVENOUS at 11:58

## 2021-06-09 RX ADMIN — PROPOFOL 30 MG: 10 INJECTION, EMULSION INTRAVENOUS at 12:04

## 2021-06-09 RX ADMIN — LIDOCAINE HYDROCHLORIDE 40 MG: 20 INJECTION, SOLUTION INTRAVENOUS at 11:40

## 2021-06-09 RX ADMIN — PROPOFOL 30 MG: 10 INJECTION, EMULSION INTRAVENOUS at 11:55

## 2021-06-09 RX ADMIN — PROPOFOL 30 MG: 10 INJECTION, EMULSION INTRAVENOUS at 11:46

## 2021-06-09 RX ADMIN — SODIUM CHLORIDE, SODIUM LACTATE, POTASSIUM CHLORIDE, AND CALCIUM CHLORIDE: .6; .31; .03; .02 INJECTION, SOLUTION INTRAVENOUS at 11:38

## 2021-06-09 RX ADMIN — PROPOFOL 30 MG: 10 INJECTION, EMULSION INTRAVENOUS at 11:43
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
## 2021-06-09 NOTE — ANESTHESIA PREPROCEDURE EVALUATION
Procedure:  COLONOSCOPY  EGD    Relevant Problems   ANESTHESIA (within normal limits)      CARDIO   (+) Benign essential hypertension   (+) Chest pain   (+) Chronic a-fib (HCC)   (+) Chronic congestive heart failure (HCC)   (+) Coronary artery disease involving native heart   (+) Mixed hyperlipidemia   (+) PAF (paroxysmal atrial fibrillation) (HCC)      ENDO   (+) Type 2 diabetes mellitus with complication, with long-term current use of insulin (HCC)      GI/HEPATIC   (+) Esophageal reflux      /RENAL   (+) Acute renal failure superimposed on stage 2 chronic kidney disease (HCC)   (+) Stage 2 chronic kidney disease      HEMATOLOGY   (+) Nutritional anemia, unspecified       MUSCULOSKELETAL   (+) Back pain with radiation      NEURO/PSYCH   (+) Diabetic neuropathy ([facility])   (+) H/O vitamin D deficiency   (+) Panic disorder without agoraphobia      PULMONARY   (+) COPD (chronic obstructive pulmonary disease) ([facility])   (+) COPD exacerbation ([facility])   (+) Chronic respiratory failure with hypoxia ([facility])   (+) SHAVONNE and COPD overlap syndrome ([facility])   (+) SHAVONNE treated with BiPAP        Physical Exam    Airway    Mallampati score: II  TM Distance: >3 FB  Neck ROM: full     Dental   lower dentures and upper dentures,     Cardiovascular  Rhythm: regular, Rate: normal,     Pulmonary  Breath sounds clear to auscultation,     Other Findings        Anesthesia Plan  ASA Score- 3     Anesthesia Type- IV sedation with anesthesia with ASA Monitors  Additional Monitors:   Airway Plan:           Plan Factors-Exercise tolerance (METS): <4 METS  Chart reviewed  EKG reviewed  Existing labs reviewed  Patient summary reviewed  Induction- intravenous  Postoperative Plan-     Informed Consent- Anesthetic plan and risks discussed with patient  I personally reviewed this patient with the CRNA  Discussed and agreed on the Anesthesia Plan with the CRNA  Shanti Parker

## 2021-06-09 NOTE — ANESTHESIA POSTPROCEDURE EVALUATION
Post-Op Assessment Note    CV Status:  Stable  Pain Score: 0    Pain management: adequate     Mental Status:  Sleepy   Hydration Status:  Stable and euvolemic   PONV Controlled:  None   Airway Patency:  Patent       Staff: CRNA         No complications documented      BP  124/65   Temp 36 0C   Pulse 83   Resp 20   SpO2 97%6lfm

## 2021-06-10 NOTE — TELEPHONE ENCOUNTER
I lmom for pt to please call back to schedule a f/u appt with Dr Sylvain Simpson  Will call pt again in 1 week if do not hear back from him

## 2021-06-14 DIAGNOSIS — E11.65 UNCONTROLLED TYPE 2 DIABETES MELLITUS WITH HYPERGLYCEMIA (HCC): ICD-10-CM

## 2021-06-14 RX ORDER — GABAPENTIN 100 MG/1
100 CAPSULE ORAL 3 TIMES DAILY
Qty: 90 CAPSULE | Refills: 10 | Status: SHIPPED | OUTPATIENT
Start: 2021-06-14 | End: 2021-06-15 | Stop reason: SDUPTHER

## 2021-06-15 DIAGNOSIS — E11.65 UNCONTROLLED TYPE 2 DIABETES MELLITUS WITH HYPERGLYCEMIA (HCC): ICD-10-CM

## 2021-06-15 RX ORDER — GABAPENTIN 100 MG/1
100 CAPSULE ORAL 3 TIMES DAILY
Qty: 90 CAPSULE | Refills: 10 | Status: SHIPPED | OUTPATIENT
Start: 2021-06-15 | End: 2021-08-24

## 2021-06-15 NOTE — PROGRESS NOTES
Spoke to the patient briefly he was currently meeting with his Samantha Mcdonald   His voice sounds clear & he sounds alert & upbeat  He states he is doing well  Briefly mentioned his high blood sugars & reminded him to limit carbs & sugars  Will follow up with him next week  15-Adebayo-2021 05:54

## 2021-06-17 DIAGNOSIS — Z79.4 TYPE 2 DIABETES MELLITUS WITH COMPLICATION, WITH LONG-TERM CURRENT USE OF INSULIN (HCC): ICD-10-CM

## 2021-06-17 DIAGNOSIS — E11.8 TYPE 2 DIABETES MELLITUS WITH COMPLICATION, WITH LONG-TERM CURRENT USE OF INSULIN (HCC): ICD-10-CM

## 2021-06-17 RX ORDER — LANCETS
EACH MISCELLANEOUS
Qty: 300 EACH | Refills: 3 | Status: SHIPPED | OUTPATIENT
Start: 2021-06-17 | End: 2022-02-21 | Stop reason: SDUPTHER

## 2021-06-17 RX ORDER — BLOOD SUGAR DIAGNOSTIC
STRIP MISCELLANEOUS
Qty: 300 EACH | Refills: 3 | Status: SHIPPED | OUTPATIENT
Start: 2021-06-17 | End: 2022-02-22 | Stop reason: SDUPTHER

## 2021-06-17 RX ORDER — BLOOD-GLUCOSE METER
EACH MISCELLANEOUS
Qty: 1 KIT | Refills: 0 | Status: SHIPPED | OUTPATIENT
Start: 2021-06-17 | End: 2022-03-07

## 2021-06-24 ENCOUNTER — TELEPHONE (OUTPATIENT)
Dept: ENDOCRINOLOGY | Facility: CLINIC | Age: 61
End: 2021-06-24

## 2021-06-25 NOTE — TELEPHONE ENCOUNTER
Reviewed  Blood sugars  high with in target range only 40% of the time   Likely diet related variability in blood sugars, hyperglycemia   Please advised consistent carb diet, taking insulin regularly before meals  If patient is eating more carbohydrates, please advised him to take more Humalog example 40- 45 units     Please ask if he has inquired  about coverage of toujeo U200, U300  Needs follow up in clinic

## 2021-06-25 NOTE — TELEPHONE ENCOUNTER
Spoke to patient provided info regarding kamran report  He verbally understood  He advised Toujeo U200 or U300 are not covered

## 2021-06-28 ENCOUNTER — TELEMEDICINE (OUTPATIENT)
Dept: ENDOCRINOLOGY | Facility: CLINIC | Age: 61
End: 2021-06-28
Payer: MEDICARE

## 2021-06-28 DIAGNOSIS — J96.11 CHRONIC HYPOXEMIC RESPIRATORY FAILURE (HCC): ICD-10-CM

## 2021-06-28 DIAGNOSIS — E11.65 UNCONTROLLED TYPE 2 DIABETES MELLITUS WITH HYPERGLYCEMIA (HCC): Primary | ICD-10-CM

## 2021-06-28 DIAGNOSIS — Z79.4 TYPE 2 DIABETES MELLITUS WITH COMPLICATION, WITH LONG-TERM CURRENT USE OF INSULIN (HCC): ICD-10-CM

## 2021-06-28 DIAGNOSIS — I10 BENIGN ESSENTIAL HYPERTENSION: ICD-10-CM

## 2021-06-28 DIAGNOSIS — E11.8 TYPE 2 DIABETES MELLITUS WITH COMPLICATION, WITH LONG-TERM CURRENT USE OF INSULIN (HCC): ICD-10-CM

## 2021-06-28 DIAGNOSIS — I25.10 CORONARY ARTERY DISEASE INVOLVING NATIVE HEART WITHOUT ANGINA PECTORIS, UNSPECIFIED VESSEL OR LESION TYPE: ICD-10-CM

## 2021-06-28 DIAGNOSIS — G47.33 OSA AND COPD OVERLAP SYNDROME (HCC): ICD-10-CM

## 2021-06-28 DIAGNOSIS — J44.9 OSA AND COPD OVERLAP SYNDROME (HCC): ICD-10-CM

## 2021-06-28 DIAGNOSIS — E66.2 CLASS 3 OBESITY WITH ALVEOLAR HYPOVENTILATION, SERIOUS COMORBIDITY, AND BODY MASS INDEX (BMI) OF 40.0 TO 44.9 IN ADULT (HCC): ICD-10-CM

## 2021-06-28 PROCEDURE — 99214 OFFICE O/P EST MOD 30 MIN: CPT | Performed by: INTERNAL MEDICINE

## 2021-06-28 PROCEDURE — 95251 CONT GLUC MNTR ANALYSIS I&R: CPT | Performed by: INTERNAL MEDICINE
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
## 2021-07-06 DIAGNOSIS — I48.91 ATRIAL FIBRILLATION WITH RVR (HCC): ICD-10-CM

## 2021-07-06 RX ORDER — APIXABAN 5 MG/1
TABLET, FILM COATED ORAL
Qty: 180 TABLET | Refills: 3 | Status: SHIPPED | OUTPATIENT
Start: 2021-07-06 | End: 2022-02-28

## 2021-07-07 DIAGNOSIS — E11.65 UNCONTROLLED TYPE 2 DIABETES MELLITUS WITH HYPERGLYCEMIA (HCC): Chronic | ICD-10-CM

## 2021-07-07 DIAGNOSIS — Z79.4 TYPE 2 DIABETES MELLITUS WITH COMPLICATION, WITH LONG-TERM CURRENT USE OF INSULIN (HCC): ICD-10-CM

## 2021-07-07 DIAGNOSIS — Z79.4 TYPE 2 DIABETES MELLITUS WITH HYPERGLYCEMIA, WITH LONG-TERM CURRENT USE OF INSULIN (HCC): ICD-10-CM

## 2021-07-07 DIAGNOSIS — E11.8 TYPE 2 DIABETES MELLITUS WITH COMPLICATION, WITH LONG-TERM CURRENT USE OF INSULIN (HCC): ICD-10-CM

## 2021-07-07 DIAGNOSIS — E11.65 TYPE 2 DIABETES MELLITUS WITH HYPERGLYCEMIA, WITH LONG-TERM CURRENT USE OF INSULIN (HCC): ICD-10-CM

## 2021-07-07 RX ORDER — INSULIN ASPART 100 [IU]/ML
INJECTION, SOLUTION INTRAVENOUS; SUBCUTANEOUS
Qty: 30 ML | Refills: 3 | Status: SHIPPED | OUTPATIENT
Start: 2021-07-07 | End: 2021-09-09 | Stop reason: SDUPTHER

## 2021-07-07 RX ORDER — INSULIN GLARGINE 100 [IU]/ML
INJECTION, SOLUTION SUBCUTANEOUS
Qty: 105 ML | Refills: 0 | Status: SHIPPED | OUTPATIENT
Start: 2021-07-07 | End: 2021-09-09 | Stop reason: SDUPTHER

## 2021-07-07 RX ORDER — LIRAGLUTIDE 6 MG/ML
INJECTION SUBCUTANEOUS
Qty: 9 ML | Refills: 1 | Status: SHIPPED | OUTPATIENT
Start: 2021-07-07 | End: 2021-07-28

## 2021-07-09 DIAGNOSIS — I50.22 CHRONIC SYSTOLIC HEART FAILURE (HCC): ICD-10-CM

## 2021-07-09 DIAGNOSIS — I48.91 ATRIAL FIBRILLATION WITH RVR (HCC): ICD-10-CM

## 2021-07-09 RX ORDER — DIGOXIN 250 MCG
TABLET ORAL
Qty: 30 TABLET | Refills: 5 | Status: SHIPPED | OUTPATIENT
Start: 2021-07-09 | End: 2021-11-04

## 2021-07-09 RX ORDER — SPIRONOLACTONE 25 MG/1
TABLET ORAL
Qty: 30 TABLET | Refills: 2 | Status: SHIPPED | OUTPATIENT
Start: 2021-07-09 | End: 2021-08-24

## 2021-07-12 ENCOUNTER — OFFICE VISIT (OUTPATIENT)
Dept: FAMILY MEDICINE CLINIC | Facility: CLINIC | Age: 61
End: 2021-07-12
Payer: MEDICARE

## 2021-07-12 VITALS
HEART RATE: 90 BPM | OXYGEN SATURATION: 95 % | BODY MASS INDEX: 39.2 KG/M2 | HEIGHT: 71 IN | DIASTOLIC BLOOD PRESSURE: 70 MMHG | SYSTOLIC BLOOD PRESSURE: 110 MMHG | WEIGHT: 280 LBS

## 2021-07-12 DIAGNOSIS — I50.32 CHRONIC DIASTOLIC CONGESTIVE HEART FAILURE (HCC): ICD-10-CM

## 2021-07-12 DIAGNOSIS — E11.65 UNCONTROLLED TYPE 2 DIABETES MELLITUS WITH HYPERGLYCEMIA (HCC): Primary | ICD-10-CM

## 2021-07-12 DIAGNOSIS — E66.9 OBESITY (BMI 30-39.9): ICD-10-CM

## 2021-07-12 DIAGNOSIS — E78.5 HYPERLIPIDEMIA, UNSPECIFIED HYPERLIPIDEMIA TYPE: ICD-10-CM

## 2021-07-12 DIAGNOSIS — I48.0 PAF (PAROXYSMAL ATRIAL FIBRILLATION) (HCC): ICD-10-CM

## 2021-07-12 PROCEDURE — 99213 OFFICE O/P EST LOW 20 MIN: CPT | Performed by: FAMILY MEDICINE

## 2021-07-16 ENCOUNTER — APPOINTMENT (OUTPATIENT)
Dept: LAB | Facility: HOSPITAL | Age: 61
End: 2021-07-16
Attending: FAMILY MEDICINE
Payer: MEDICARE

## 2021-07-16 DIAGNOSIS — I48.0 PAF (PAROXYSMAL ATRIAL FIBRILLATION) (HCC): ICD-10-CM

## 2021-07-16 DIAGNOSIS — E78.5 HYPERLIPIDEMIA, UNSPECIFIED HYPERLIPIDEMIA TYPE: ICD-10-CM

## 2021-07-16 DIAGNOSIS — E11.65 UNCONTROLLED TYPE 2 DIABETES MELLITUS WITH HYPERGLYCEMIA (HCC): ICD-10-CM

## 2021-07-16 DIAGNOSIS — E66.9 OBESITY (BMI 30-39.9): ICD-10-CM

## 2021-07-16 DIAGNOSIS — I50.32 CHRONIC DIASTOLIC CONGESTIVE HEART FAILURE (HCC): ICD-10-CM

## 2021-07-16 LAB
ALBUMIN SERPL BCP-MCNC: 3.6 G/DL (ref 3.5–5)
ALP SERPL-CCNC: 92 U/L (ref 46–116)
ALT SERPL W P-5'-P-CCNC: 53 U/L (ref 12–78)
ANION GAP SERPL CALCULATED.3IONS-SCNC: 10 MMOL/L (ref 4–13)
AST SERPL W P-5'-P-CCNC: 33 U/L (ref 5–45)
BILIRUB SERPL-MCNC: 0.31 MG/DL (ref 0.2–1)
BUN SERPL-MCNC: 21 MG/DL (ref 5–25)
CALCIUM SERPL-MCNC: 8.6 MG/DL (ref 8.3–10.1)
CHLORIDE SERPL-SCNC: 98 MMOL/L (ref 100–108)
CHOLEST SERPL-MCNC: 103 MG/DL (ref 50–200)
CO2 SERPL-SCNC: 28 MMOL/L (ref 21–32)
CREAT SERPL-MCNC: 1.16 MG/DL (ref 0.6–1.3)
CREAT UR-MCNC: 96.1 MG/DL
ERYTHROCYTE [DISTWIDTH] IN BLOOD BY AUTOMATED COUNT: 13.3 % (ref 11.6–15.1)
EST. AVERAGE GLUCOSE BLD GHB EST-MCNC: 157 MG/DL
GFR SERPL CREATININE-BSD FRML MDRD: 68 ML/MIN/1.73SQ M
GLUCOSE P FAST SERPL-MCNC: 185 MG/DL (ref 65–99)
HBA1C MFR BLD: 7.1 %
HCT VFR BLD AUTO: 40.2 % (ref 36.5–49.3)
HDLC SERPL-MCNC: 29 MG/DL
HGB BLD-MCNC: 13.5 G/DL (ref 12–17)
LDLC SERPL DIRECT ASSAY-MCNC: 29 MG/DL (ref 0–100)
MAGNESIUM SERPL-MCNC: 1.9 MG/DL (ref 1.6–2.6)
MCH RBC QN AUTO: 32.5 PG (ref 26.8–34.3)
MCHC RBC AUTO-ENTMCNC: 33.6 G/DL (ref 31.4–37.4)
MCV RBC AUTO: 97 FL (ref 82–98)
MICROALBUMIN UR-MCNC: 5.4 MG/L (ref 0–20)
MICROALBUMIN/CREAT 24H UR: 6 MG/G CREATININE (ref 0–30)
PLATELET # BLD AUTO: 145 THOUSANDS/UL (ref 149–390)
PMV BLD AUTO: 10 FL (ref 8.9–12.7)
POTASSIUM SERPL-SCNC: 4.4 MMOL/L (ref 3.5–5.3)
PROT SERPL-MCNC: 6.9 G/DL (ref 6.4–8.2)
RBC # BLD AUTO: 4.15 MILLION/UL (ref 3.88–5.62)
SODIUM SERPL-SCNC: 136 MMOL/L (ref 136–145)
TRIGL SERPL-MCNC: 437 MG/DL
TSH SERPL DL<=0.05 MIU/L-ACNC: 1.8 UIU/ML (ref 0.36–3.74)
WBC # BLD AUTO: 12.22 THOUSAND/UL (ref 4.31–10.16)

## 2021-07-16 PROCEDURE — 82570 ASSAY OF URINE CREATININE: CPT

## 2021-07-16 PROCEDURE — 85027 COMPLETE CBC AUTOMATED: CPT

## 2021-07-16 PROCEDURE — 83721 ASSAY OF BLOOD LIPOPROTEIN: CPT

## 2021-07-16 PROCEDURE — 82043 UR ALBUMIN QUANTITATIVE: CPT

## 2021-07-16 PROCEDURE — 83735 ASSAY OF MAGNESIUM: CPT

## 2021-07-16 PROCEDURE — 80061 LIPID PANEL: CPT

## 2021-07-16 PROCEDURE — 84443 ASSAY THYROID STIM HORMONE: CPT

## 2021-07-16 PROCEDURE — 80053 COMPREHEN METABOLIC PANEL: CPT

## 2021-07-16 PROCEDURE — 36415 COLL VENOUS BLD VENIPUNCTURE: CPT

## 2021-07-16 PROCEDURE — 83036 HEMOGLOBIN GLYCOSYLATED A1C: CPT

## 2021-07-20 ENCOUNTER — TELEPHONE (OUTPATIENT)
Dept: FAMILY MEDICINE CLINIC | Facility: CLINIC | Age: 61
End: 2021-07-20

## 2021-07-20 NOTE — TELEPHONE ENCOUNTER
Patient is set up for an appointment Aug 10 th at 2:00 with Dr Amy Hernandez  He needs transportation ,could you set it up with them ? TC/cma ----- Message from Zoe Ibarra sent at 7/19/2021 10:36 PM EDT -----  Regarding: RE: Visit Follow-Up Question  Contact: [phone]    Yea have Marlys set up an office appointment with you and I will need a ride  Thanks  Look forward to seeing you in person  It's been a long time  Jeri Fontenot  ----- Message -----  From: Leah Timmons MD  Sent: 7/19/21 9:29 PM  To: Bianca James  Subject: RE: Visit Follow-Up Question    Anthony Galeano about your cousin--my condolences  I'd be happy to see you in the office  Would you like Marlys to call to set up appt-?-she can also coordinate w Pillo Duran to get you ride to office if needed  ----- Message -----       From:Alonzo Watson       Sent:7/19/2021  2:24 AM EDT         MD [address]ique [address] Dr  I think it is time for me to make an in person visit  Covid-19 is getting out of hand again  I am willing to come to see you now but if we wait I'm afraid that Covid is just going to get worse  This is what Dr Powell Senior doesn't understand  I just lost my cousin at the age of 60 due to congested heart failure    Get back to me when you can  Thank you  Jeri Fontenot  1960
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
## 2021-07-27 ENCOUNTER — TELEPHONE (OUTPATIENT)
Dept: GASTROENTEROLOGY | Facility: CLINIC | Age: 61
End: 2021-07-27

## 2021-07-27 NOTE — TELEPHONE ENCOUNTER
Pt called with concerns about the area on his left abd that was evaluated with a ct scan and inflammation was present  He states that now he fells tightness and tenderness in this area  He also states he has had some black stools

## 2021-07-28 DIAGNOSIS — E11.8 TYPE 2 DIABETES MELLITUS WITH COMPLICATION, WITH LONG-TERM CURRENT USE OF INSULIN (HCC): ICD-10-CM

## 2021-07-28 DIAGNOSIS — Z79.4 TYPE 2 DIABETES MELLITUS WITH COMPLICATION, WITH LONG-TERM CURRENT USE OF INSULIN (HCC): ICD-10-CM

## 2021-07-28 RX ORDER — LIRAGLUTIDE 6 MG/ML
INJECTION SUBCUTANEOUS
Qty: 9 ML | Refills: 1 | Status: SHIPPED | OUTPATIENT
Start: 2021-07-28 | End: 2021-09-17

## 2021-08-05 ENCOUNTER — TELEPHONE (OUTPATIENT)
Dept: GASTROENTEROLOGY | Facility: CLINIC | Age: 61
End: 2021-08-05

## 2021-08-05 ENCOUNTER — TELEPHONE (OUTPATIENT)
Dept: FAMILY MEDICINE CLINIC | Facility: CLINIC | Age: 61
End: 2021-08-05

## 2021-08-05 NOTE — TELEPHONE ENCOUNTER
Dr Shy De Leon,    Patient has an [facility] appointment with you for 8/18 and patient called to state that someone was supposed to set up a ride for him  Please advise

## 2021-08-05 NOTE — TELEPHONE ENCOUNTER
I believe Amanda Corral takes care of this thru [employer](?)but I'm not sure what day she'll be back in office--is this something you can help pt with?

## 2021-08-06 ENCOUNTER — PATIENT OUTREACH (OUTPATIENT)
Dept: FAMILY MEDICINE CLINIC | Facility: CLINIC | Age: 61
End: 2021-08-06

## 2021-08-06 ENCOUNTER — OFFICE VISIT (OUTPATIENT)
Dept: GASTROENTEROLOGY | Facility: CLINIC | Age: 61
End: 2021-08-06
Payer: MEDICARE

## 2021-08-06 VITALS
HEART RATE: 104 BPM | DIASTOLIC BLOOD PRESSURE: 77 MMHG | WEIGHT: 270 LBS | HEIGHT: 70 IN | SYSTOLIC BLOOD PRESSURE: 145 MMHG | BODY MASS INDEX: 38.65 KG/M2

## 2021-08-06 DIAGNOSIS — E11.43 DIABETIC GASTROPARESIS (HCC): ICD-10-CM

## 2021-08-06 DIAGNOSIS — R10.32 LEFT LOWER QUADRANT PAIN: Primary | ICD-10-CM

## 2021-08-06 DIAGNOSIS — K31.84 DIABETIC GASTROPARESIS (HCC): ICD-10-CM

## 2021-08-06 DIAGNOSIS — K58.0 IRRITABLE BOWEL SYNDROME WITH DIARRHEA: ICD-10-CM

## 2021-08-06 DIAGNOSIS — D69.6 PLATELETS DECREASED (HCC): ICD-10-CM

## 2021-08-06 DIAGNOSIS — K21.9 GASTROESOPHAGEAL REFLUX DISEASE: ICD-10-CM

## 2021-08-06 DIAGNOSIS — E66.2 CLASS 3 OBESITY WITH ALVEOLAR HYPOVENTILATION, SERIOUS COMORBIDITY, AND BODY MASS INDEX (BMI) OF 40.0 TO 44.9 IN ADULT (HCC): ICD-10-CM

## 2021-08-06 PROCEDURE — 99214 OFFICE O/P EST MOD 30 MIN: CPT | Performed by: INTERNAL MEDICINE

## 2021-08-06 RX ORDER — OMEPRAZOLE 20 MG/1
20 CAPSULE, DELAYED RELEASE ORAL DAILY
Qty: 90 CAPSULE | Refills: 3 | Status: SHIPPED | OUTPATIENT
Start: 2021-08-06 | End: 2022-07-19

## 2021-08-06 RX ORDER — DICYCLOMINE HCL 20 MG
20 TABLET ORAL EVERY 6 HOURS
Qty: 60 TABLET | Refills: 1 | Status: SHIPPED | OUTPATIENT
Start: 2021-08-06 | End: 2021-08-18
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
## 2021-08-06 NOTE — PROGRESS NOTES
SW received inbasket from Dr Keyana Jones regarding transportation needing to be set up for his next appt  Reviewed pt notes  It is noted on 7/20 that Marlys RICHARDSON is to arrange ride for pt with [facility]  I called and spoke with pt  Pt said Marlys was supposed to arrange this with [facility] and ride needs to be schedule through [facility], two days before appt  I told pt I will let office and PCP know  Pt was appreciative and confirmed appt for 8/18  Inbasekt to Dr Keyana Jones, Mary Padron and [facility]

## 2021-08-06 NOTE — TELEPHONE ENCOUNTER
Ok-thanks--I'll see if other front staff can help him while [facility] is out of office-

## 2021-08-11 ENCOUNTER — TELEPHONE (OUTPATIENT)
Dept: PULMONOLOGY | Facility: CLINIC | Age: 61
End: 2021-08-11

## 2021-08-12 NOTE — TELEPHONE ENCOUNTER
I called [facility] and spoke w/ Kana Angry  He stated that pt would not be eligible for new machine until 3/27/2024  His machine is also on the recall list and would have to be submitted to [facility] in order to have machine replaced  Pt also just received cpap supplies on 8/10  I called pt with this info

## 2021-08-18 ENCOUNTER — OFFICE VISIT (OUTPATIENT)
Dept: FAMILY MEDICINE CLINIC | Facility: CLINIC | Age: 61
End: 2021-08-18
Payer: MEDICARE

## 2021-08-18 VITALS
WEIGHT: 302.2 LBS | HEIGHT: 70 IN | HEART RATE: 90 BPM | OXYGEN SATURATION: 97 % | SYSTOLIC BLOOD PRESSURE: 160 MMHG | RESPIRATION RATE: 20 BRPM | BODY MASS INDEX: 43.26 KG/M2 | DIASTOLIC BLOOD PRESSURE: 82 MMHG | TEMPERATURE: 97.8 F

## 2021-08-18 DIAGNOSIS — J44.9 OSA AND COPD OVERLAP SYNDROME (HCC): ICD-10-CM

## 2021-08-18 DIAGNOSIS — E11.65 TYPE 2 DIABETES MELLITUS WITH HYPERGLYCEMIA, WITH LONG-TERM CURRENT USE OF INSULIN (HCC): ICD-10-CM

## 2021-08-18 DIAGNOSIS — I50.42 CHRONIC COMBINED SYSTOLIC AND DIASTOLIC CONGESTIVE HEART FAILURE (HCC): ICD-10-CM

## 2021-08-18 DIAGNOSIS — I10 BENIGN ESSENTIAL HYPERTENSION: Primary | ICD-10-CM

## 2021-08-18 DIAGNOSIS — Z79.4 TYPE 2 DIABETES MELLITUS WITH HYPERGLYCEMIA, WITH LONG-TERM CURRENT USE OF INSULIN (HCC): ICD-10-CM

## 2021-08-18 DIAGNOSIS — M62.81 MUSCLE WEAKNESS (GENERALIZED): ICD-10-CM

## 2021-08-18 DIAGNOSIS — R53.82 CHRONIC FATIGUE: Chronic | ICD-10-CM

## 2021-08-18 DIAGNOSIS — K21.00 CHRONIC REFLUX ESOPHAGITIS: ICD-10-CM

## 2021-08-18 DIAGNOSIS — G47.33 OSA AND COPD OVERLAP SYNDROME (HCC): ICD-10-CM

## 2021-08-18 PROCEDURE — 99214 OFFICE O/P EST MOD 30 MIN: CPT | Performed by: FAMILY MEDICINE

## 2021-08-20 ENCOUNTER — HOSPITAL ENCOUNTER (OUTPATIENT)
Dept: RADIOLOGY | Facility: HOSPITAL | Age: 61
Discharge: HOME | End: 2021-08-20
Attending: INTERNAL MEDICINE
Payer: MEDICARE

## 2021-08-20 DIAGNOSIS — R10.32 LEFT LOWER QUADRANT PAIN: ICD-10-CM

## 2021-08-20 PROCEDURE — G1004 CDSM NDSC: HCPCS

## 2021-08-20 PROCEDURE — 74177 CT ABD & PELVIS W/CONTRAST: CPT

## 2021-08-20 RX ADMIN — IOHEXOL 100 ML: 350 INJECTION, SOLUTION INTRAVENOUS at 14:31

## 2021-08-24 ENCOUNTER — TELEPHONE (OUTPATIENT)
Dept: FAMILY MEDICINE CLINIC | Facility: CLINIC | Age: 61
End: 2021-08-24

## 2021-08-24 DIAGNOSIS — E11.65 UNCONTROLLED TYPE 2 DIABETES MELLITUS WITH HYPERGLYCEMIA (HCC): ICD-10-CM

## 2021-08-24 DIAGNOSIS — I50.22 CHRONIC SYSTOLIC HEART FAILURE (HCC): ICD-10-CM

## 2021-08-24 RX ORDER — GABAPENTIN 100 MG/1
CAPSULE ORAL
Qty: 90 CAPSULE | Refills: 10 | Status: SHIPPED | OUTPATIENT
Start: 2021-08-24 | End: 2021-10-04 | Stop reason: SDUPTHER

## 2021-08-24 RX ORDER — SPIRONOLACTONE 25 MG/1
TABLET ORAL
Qty: 30 TABLET | Refills: 2 | Status: SHIPPED | OUTPATIENT
Start: 2021-08-24 | End: 2021-10-14

## 2021-08-24 NOTE — TELEPHONE ENCOUNTER
This was taken care of tc/cmaRegarding: Visit Follow-Up Question  ----- Message from [facility] sent at 8/16/2021  2:59 PM EDT -----       ----- Message from Rios Mota to Chinmay Casas MD sent at 8/16/2021 10:13 AM -----   Anthony Frankel and Dr Susan iHghtower  Do you know if anyone has arranged transportation for me for my appointment with Dr Susan Hightower this week  I called last week and I was told they will get back to me    I haven't heard from anyone  Get back to me when you can  Thanks  Miguel Mckee  1960

## 2021-08-30 ENCOUNTER — TELEPHONE (OUTPATIENT)
Dept: GASTROENTEROLOGY | Facility: CLINIC | Age: 61
End: 2021-08-30

## 2021-09-03 DIAGNOSIS — I50.43 ACUTE ON CHRONIC COMBINED SYSTOLIC AND DIASTOLIC HEART FAILURE (HCC): ICD-10-CM

## 2021-09-03 RX ORDER — POTASSIUM CHLORIDE 20 MEQ/1
TABLET, EXTENDED RELEASE ORAL
Qty: 90 TABLET | Refills: 3 | Status: SHIPPED | OUTPATIENT
Start: 2021-09-03 | End: 2021-11-29 | Stop reason: HOSPADM

## 2021-09-08 ENCOUNTER — OFFICE VISIT (OUTPATIENT)
Dept: CARDIOLOGY CLINIC | Facility: CLINIC | Age: 61
End: 2021-09-08
Payer: MEDICARE

## 2021-09-08 VITALS
DIASTOLIC BLOOD PRESSURE: 86 MMHG | HEART RATE: 76 BPM | HEIGHT: 70 IN | OXYGEN SATURATION: 97 % | SYSTOLIC BLOOD PRESSURE: 126 MMHG | TEMPERATURE: 97.4 F | BODY MASS INDEX: 42.98 KG/M2 | WEIGHT: 300.2 LBS

## 2021-09-08 DIAGNOSIS — E78.5 DYSLIPIDEMIA: ICD-10-CM

## 2021-09-08 DIAGNOSIS — J98.4 LUNG DISEASE, RESTRICTIVE: ICD-10-CM

## 2021-09-08 DIAGNOSIS — I25.10 CORONARY ARTERY DISEASE INVOLVING NATIVE HEART WITHOUT ANGINA PECTORIS, UNSPECIFIED VESSEL OR LESION TYPE: Primary | ICD-10-CM

## 2021-09-08 DIAGNOSIS — I48.20 CHRONIC A-FIB (HCC): ICD-10-CM

## 2021-09-08 DIAGNOSIS — I50.33 ACUTE ON CHRONIC DIASTOLIC HEART FAILURE (HCC): ICD-10-CM

## 2021-09-08 PROCEDURE — 99214 OFFICE O/P EST MOD 30 MIN: CPT | Performed by: INTERNAL MEDICINE

## 2021-09-08 RX ORDER — TORSEMIDE 20 MG/1
20 TABLET ORAL 2 TIMES DAILY
Qty: 180 TABLET | Refills: 3 | Status: SHIPPED | OUTPATIENT
Start: 2021-09-08 | End: 2022-02-08 | Stop reason: HOSPADM
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
## 2021-09-09 ENCOUNTER — TELEPHONE (OUTPATIENT)
Dept: FAMILY MEDICINE CLINIC | Facility: CLINIC | Age: 61
End: 2021-09-09

## 2021-09-09 DIAGNOSIS — Z79.4 TYPE 2 DIABETES MELLITUS WITH HYPERGLYCEMIA, WITH LONG-TERM CURRENT USE OF INSULIN (HCC): ICD-10-CM

## 2021-09-09 DIAGNOSIS — E11.65 TYPE 2 DIABETES MELLITUS WITH HYPERGLYCEMIA, WITH LONG-TERM CURRENT USE OF INSULIN (HCC): ICD-10-CM

## 2021-09-09 PROCEDURE — 93000 ELECTROCARDIOGRAM COMPLETE: CPT | Performed by: INTERNAL MEDICINE

## 2021-09-09 NOTE — TELEPHONE ENCOUNTER
Pt called, [facility] pharmacy in [facility] [phone]  Pt needs refills on  Basaglar 75 units twice daily, Novolog  35 units at mealtime plus sliding scale

## 2021-09-10 ENCOUNTER — OFFICE VISIT (OUTPATIENT)
Dept: PULMONOLOGY | Facility: MEDICAL CENTER | Age: 61
End: 2021-09-10
Payer: MEDICARE

## 2021-09-10 VITALS
SYSTOLIC BLOOD PRESSURE: 136 MMHG | BODY MASS INDEX: 41.66 KG/M2 | HEIGHT: 70 IN | TEMPERATURE: 97.8 F | DIASTOLIC BLOOD PRESSURE: 70 MMHG | OXYGEN SATURATION: 96 % | WEIGHT: 291 LBS | HEART RATE: 86 BPM | RESPIRATION RATE: 16 BRPM

## 2021-09-10 DIAGNOSIS — J41.0 SIMPLE CHRONIC BRONCHITIS (HCC): ICD-10-CM

## 2021-09-10 DIAGNOSIS — G47.33 OBSTRUCTIVE SLEEP APNEA: Primary | ICD-10-CM

## 2021-09-10 DIAGNOSIS — J43.2 CENTRILOBULAR EMPHYSEMA (HCC): ICD-10-CM

## 2021-09-10 DIAGNOSIS — E66.01 MORBID OBESITY (HCC): ICD-10-CM

## 2021-09-10 DIAGNOSIS — E66.2 CLASS 3 OBESITY WITH ALVEOLAR HYPOVENTILATION, SERIOUS COMORBIDITY, AND BODY MASS INDEX (BMI) OF 40.0 TO 44.9 IN ADULT (HCC): ICD-10-CM

## 2021-09-10 DIAGNOSIS — J96.11 CHRONIC RESPIRATORY FAILURE WITH HYPOXIA (HCC): ICD-10-CM

## 2021-09-10 PROCEDURE — 99214 OFFICE O/P EST MOD 30 MIN: CPT | Performed by: INTERNAL MEDICINE

## 2021-09-11 PROBLEM — J41.0 SIMPLE CHRONIC BRONCHITIS (HCC): Status: ACTIVE | Noted: 2020-10-11
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
## 2021-09-12 NOTE — PROGRESS NOTES
Assessment/Plan:  Diagnoses and all orders for this visit:    Benign essential hypertension    Chronic fatigue    Chronic reflux esophagitis    SHAVONNE and COPD overlap syndrome (HCC)    Type 2 diabetes mellitus with hyperglycemia, with long-term current use of insulin (HCC)    Chronic combined systolic and diastolic congestive heart failure (HCC)    Muscle weakness (generalized)    BMI 40 0-44 9, adult ([facility] [address] )    await CT abd/pelvis  Cont current meds  Encouraged adherence w ADA, low salt, low chol diet  Monitor I/Os, daily wgts  Upcoming cardio/pulm evals     DUE TO UNSTEADY GAIT SECONDARY TO MULTIPLE COMORBIDITIES INCLUDING BUT NOT LIMITED TO THOSE OUTLINED ABOVE PT IS AT INCREASED FALL RISK AND CONTINUES TO USE HIS HOVEROUND POWER MOBILITY DEVICE  HE IS REQUESTING REPAIRS FOR HIS MOBILITY DEVICE   THIS EQUIPMENT IS DEEMED MEDICALLY NECESSARY TO ASSIST WITH HIS TRANSPORT AND INDEPENDENCE  Subjective:      Patient ID: Olivia Cedeno is a 63 y o  male  Chief Complaint   Patient presents with    Follow-up     fatigue is worsening , pt going friday of ct abdominal and pelvis     Cough     mucus is clear smokey color , harsher cough that aggravates his chest        HPI  Follow-up  Interval hx reviewed  Has been having mainly telemed visits in face of covid pandemic  Did receive J&J vaccine in April  Blood sugars stabilizng  No longer following w endocrine  Ongoing fatigue, intermittent harsh cough-no fever  Sched for CT abd/pelvis end of this week  Has upcoming cardio and pulm visits  NST 2/2021-neg-EF approx 65%  Muscle weakness--using Hoveround  Power mobility device  The following portions of the patient's history were reviewed and updated as appropriate: allergies, current medications, past family history, past medical history, past social history, past surgical history and problem list     Review of Systems   Constitutional: Positive for fatigue  Negative for fever     Eyes:        Wears glasses Respiratory:        HEREDIA   Cardiovascular: Positive for palpitations and leg swelling  Gastrointestinal:        GERD   Endocrine:        DM   Musculoskeletal: Positive for arthralgias, back pain, gait problem and myalgias  Neurological: Positive for weakness  Psychiatric/Behavioral: The patient is nervous/anxious            Current Outpatient Medications   Medication Sig Dispense Refill    acetaminophen (TYLENOL) 325 mg tablet Take 2 tablets (650 mg total) by mouth every 6 (six) hours as needed for mild pain, headaches or fever 30 tablet 0    albuterol (2 5 mg/3 mL) 0 083 % nebulizer solution Take 1 vial (2 5 mg total) by nebulization every 6 (six) hours as needed for wheezing 360 mL 5    ALPRAZolam (XANAX) 2 MG tablet Take 2 mg by mouth daily at bedtime       Ascorbic Acid (VITAMIN C) 1000 MG tablet Take 1,000 mg by mouth daily      aspirin 81 MG tablet Take 81 mg by mouth every morning        Blood Glucose Monitoring Suppl (OneTouch Verio) w/Device KIT Use to test blood sugar 1 kit 0    busPIRone (BUSPAR) 10 mg tablet Take 10 mg by mouth 2 (two) times a day       cholecalciferol (VITAMIN D3) 1,000 units tablet Take 1 tablet (1,000 Units total) by mouth daily 90 tablet 3    diclofenac sodium (Voltaren) 1 % Apply 2 g topically 2 (two) times a day as needed (For pain) 100 g 0    dicyclomine (BENTYL) 20 mg tablet Take 1 tablet (20 mg total) by mouth every 6 (six) hours 60 tablet 1    digoxin (LANOXIN) 0 25 mg tablet TAKE ONE TABLET BY MOUTH DAILY 30 tablet 5    Eliquis 5 MG TAKE ONE TABLET BY MOUTH TWICE DAILY 180 tablet 3    glucose blood (OneTouch Verio) test strip test blood sugar 3 (three) times a day 300 each 3    Insulin Pen Needle 32G X 4 MM MISC Use to inject insulin 5 times a day 500 each 3    Lancets (onetouch ultrasoft) lancets test blood sugar 3 (three) times a day 300 each 3    losartan (COZAAR) 50 mg tablet Take 1 tablet (50 mg total) by mouth daily 90 tablet 3    metFORMIN (GLUCOPHAGE-XR) 500 mg 24 hr tablet TAKE 1 TABLET BY MOUTH DAILY 90 tablet 3    metoclopramide (REGLAN) 5 mg tablet Take 1 tablet (5 mg total) by mouth 2 (two) times a day before lunch and dinner 60 tablet 1    metoprolol succinate (TOPROL-XL) 200 MG 24 hr tablet Take 1 tablet (200 mg total) by mouth daily 90 tablet 3    Multiple Vitamins-Minerals (CENTRUM SILVER 50+MEN PO) Take by mouth      omeprazole (PriLOSEC) 20 mg delayed release capsule Take 1 capsule (20 mg total) by mouth daily 90 capsule 3    QUEtiapine (SEROquel XR) 200 mg 24 hr tablet Take 200 mg by mouth every morning   1    QUEtiapine (SEROquel) 300 mg tablet Take 300 mg by mouth daily at bedtime      sertraline (ZOLOFT) 50 mg tablet Take 50 mg by mouth daily Daily at bedtime      Vascepa 1 g CAPS Take 2 capsules (2 g total) by mouth 2 (two) times a day 360 capsule 3    atorvastatin (LIPITOR) 80 mg tablet TAKE ONE TABLET BY MOUTH DAILY 90 tablet 3    fluticasone-umeclidinium-vilanterol (TRELEGY) 100-62 5-25 MCG/INH inhaler Inhale 1 puff daily Rinse mouth after use  1 each 11    gabapentin (NEURONTIN) 100 mg capsule TAKE ONE CAPSULE BY MOUTH 3 TIMES A DAY 90 capsule 10    insulin glargine (Basaglar KwikPen) 100 units/mL injection pen Inject under the skin 75 units in the morning and at bedtime 105 mL 3    NovoLOG FlexPen 100 units/mL injection pen INJECT 35 UNITS UNDER THE SKIN THREE TIMES A DAY WITH MEALS AND PER SLIDING SCALE 30 mL 3    potassium chloride (K-DUR,KLOR-CON) 20 mEq tablet TAKE ONE TABLET BY MOUTH DAILY 90 tablet 3    spironolactone (ALDACTONE) 25 mg tablet TAKE ONE TABLET BY MOUTH DAILY 30 tablet 2    torsemide (DEMADEX) 20 mg tablet Take 1 tablet (20 mg total) by mouth 2 (two) times a day 180 tablet 3    Victoza injection INJECT 1 8 MG UNDER THE SKIN ONCE A DAY 9 mL 1     No current facility-administered medications for this visit         Objective:    /82 (BP Location: Left arm, Patient Position: Sitting, Cuff Size: Large)   Pulse 90   Temp 97 8 °F (36 6 °C)   Resp 20   Ht 5' 10" (1 778 m)   Wt (!) 137 kg (302 lb 3 2 oz)   SpO2 97% Comment: on 2 liters O2  BMI 43 36 kg/m²        Physical Exam  Vitals and nursing note reviewed  Constitutional:       General: He is not in acute distress  Appearance: He is obese  Eyes:      General: No scleral icterus  Conjunctiva/sclera: Conjunctivae normal    Cardiovascular:      Rate and Rhythm: Normal rate and regular rhythm  Pulmonary:      Comments: Few scattered exp wheezes  Slight Dec basilar breath sounds  Abdominal:      General: Bowel sounds are normal       Palpations: Abdomen is soft  Tenderness: There is no abdominal tenderness  Musculoskeletal:         General: Tenderness present  Cervical back: Neck supple  Right lower leg: Edema present  Left lower leg: Edema present  Comments: Using power mobility device   Skin:     General: Skin is warm and dry  Neurological:      Mental Status: He is alert and oriented to person, place, and time     Psychiatric:         Mood and Affect: Mood normal               Chinmay Casas MD

## 2021-09-12 NOTE — ASSESSMENT & PLAN NOTE
He will continues oxygen L per min with activity and also 2 L at bedtime with his BiPAP machine    He does have a battery operated portable oxygen concentrator which she uses at 2-3 liters/minute he leaves his apartment complex

## 2021-09-12 NOTE — ASSESSMENT & PLAN NOTE
Mild to moderate SHAVONNE with good compliance and benefit to CPAP therapy  Not having any nocturnal dyspnea or excessive daytime somnolence  I reviewed compliance data from the past 1 month and he used the BiPAP every night for average of almost 9 hours per night  His BiPAP is set on auto mode with maximum IPAP of 18 cm water and minimum EPAP of 5 cm water  He has pressure support maximum end of 7 cm water  He uses Bi-Flex of 3   His average inspiratory pressure was 14 cm water and average EPAP pressure was 7 cm water  His maximum titrated IPAP was 18 cm water  This resulted in AHI of 0 4 which is good  Continue same settings with 2 l/m of oxygen at bedtime  He uses fullface mask interface  [employer] kinetic company is [email or web address]    He does have auto dream Station machine which was set up on 03/27/2019  I did tell bruise to register his BiPAP machine with [employer] as it is on recall  His BiPAP machine appears to be function properly

## 2021-09-12 NOTE — ASSESSMENT & PLAN NOTE
He is doing well on regimen of Trelegy 100 mcg 1 puff daily and albuterol inhaler as needed or nebulizer with albuterol 2 5 mg as needed

## 2021-09-16 RX ORDER — INSULIN GLARGINE 100 [IU]/ML
INJECTION, SOLUTION SUBCUTANEOUS
Qty: 105 ML | Refills: 3 | Status: SHIPPED | OUTPATIENT
Start: 2021-09-16 | End: 2022-03-30

## 2021-09-17 DIAGNOSIS — E78.5 HYPERLIPIDEMIA, UNSPECIFIED HYPERLIPIDEMIA TYPE: ICD-10-CM

## 2021-09-17 DIAGNOSIS — Z79.4 TYPE 2 DIABETES MELLITUS WITH COMPLICATION, WITH LONG-TERM CURRENT USE OF INSULIN (HCC): ICD-10-CM

## 2021-09-17 DIAGNOSIS — E11.8 TYPE 2 DIABETES MELLITUS WITH COMPLICATION, WITH LONG-TERM CURRENT USE OF INSULIN (HCC): ICD-10-CM

## 2021-09-17 RX ORDER — ATORVASTATIN CALCIUM 80 MG/1
TABLET, FILM COATED ORAL
Qty: 90 TABLET | Refills: 3 | Status: SHIPPED | OUTPATIENT
Start: 2021-09-17 | End: 2021-11-02

## 2021-09-17 RX ORDER — LIRAGLUTIDE 6 MG/ML
INJECTION SUBCUTANEOUS
Qty: 9 ML | Refills: 1 | Status: SHIPPED | OUTPATIENT
Start: 2021-09-17 | End: 2021-11-09 | Stop reason: SDUPTHER

## 2021-09-21 PROBLEM — M62.81 MUSCLE WEAKNESS (GENERALIZED): Status: ACTIVE | Noted: 2021-09-21

## 2021-09-23 ENCOUNTER — TELEPHONE (OUTPATIENT)
Dept: PULMONOLOGY | Facility: CLINIC | Age: 61
End: 2021-09-23

## 2021-09-23 ENCOUNTER — TELEPHONE (OUTPATIENT)
Dept: PULMONOLOGY | Facility: MEDICAL CENTER | Age: 61
End: 2021-09-23

## 2021-09-23 DIAGNOSIS — J20.9 ACUTE BRONCHITIS, UNSPECIFIED ORGANISM: Primary | ICD-10-CM

## 2021-09-23 DIAGNOSIS — Z20.822 ENCOUNTER FOR LABORATORY TESTING FOR COVID-19 VIRUS: Primary | ICD-10-CM

## 2021-09-23 RX ORDER — AZITHROMYCIN 250 MG/1
TABLET, FILM COATED ORAL
Qty: 6 TABLET | Refills: 0 | Status: SHIPPED | OUTPATIENT
Start: 2021-09-23 | End: 2021-09-27

## 2021-09-23 NOTE — TELEPHONE ENCOUNTER
Spoke with patient  He said he has been sick since the weekend  He is wheezing, has a sore throat,  body aches, fatigue, cough, SOB on exertion, chest tightness, headaches and sweats  He said the fatigue is bad, it is hard for him to stay away  He said his cough is more harsh than normal and he is coughing up smoky clear mucous  He will start using his nebulizer  He is using OTC pain relief  I put in an order for a Covid swab but he says he does not have a ride and cannot afford a taxi right now  Please advise

## 2021-09-23 NOTE — TELEPHONE ENCOUNTER
I called patient  As you said he cannot go out for COVID testing  Will give a 5 day Z-Ruiz in case he has acute bronchitis    He will call back if he has no improvement

## 2021-09-23 NOTE — TELEPHONE ENCOUNTER
I spoke with patient  He has been having some congestion cough  Not have any fever chills  Breathing is okay right now  Does feel tired  He did have [employer] vaccine back in April of this year  He states it would be difficult for him to go out to get tested for COVID  He does not drive and has no but he did take him  I will prescribe a 5 day Z-Ruiz in case he has acute bronchitis    I told if he does not improve with this he should call our office

## 2021-09-23 NOTE — TELEPHONE ENCOUNTER
Patient calling stating the past couple days he has been wheezing, has a sore throat, body aches and extreme fatigue   Please advise [phone]

## 2021-10-06 ENCOUNTER — TELEPHONE (OUTPATIENT)
Dept: PULMONOLOGY | Facility: CLINIC | Age: 61
End: 2021-10-06

## 2021-10-14 DIAGNOSIS — I50.22 CHRONIC SYSTOLIC HEART FAILURE (HCC): ICD-10-CM

## 2021-10-14 RX ORDER — SPIRONOLACTONE 25 MG/1
TABLET ORAL
Qty: 30 TABLET | Refills: 2 | Status: SHIPPED | OUTPATIENT
Start: 2021-10-14 | End: 2021-10-15

## 2021-10-15 RX ORDER — SPIRONOLACTONE 25 MG/1
TABLET ORAL
Qty: 30 TABLET | Refills: 2 | Status: SHIPPED | OUTPATIENT
Start: 2021-10-15 | End: 2021-12-08

## 2021-10-21 ENCOUNTER — TELEPHONE (OUTPATIENT)
Dept: ENDOCRINOLOGY | Facility: CLINIC | Age: 61
End: 2021-10-21

## 2021-10-26 ENCOUNTER — TELEMEDICINE (OUTPATIENT)
Dept: FAMILY MEDICINE CLINIC | Facility: CLINIC | Age: 61
End: 2021-10-26
Payer: MEDICARE

## 2021-10-26 VITALS — WEIGHT: 291 LBS | HEIGHT: 70 IN | BODY MASS INDEX: 41.66 KG/M2

## 2021-10-26 DIAGNOSIS — F99 PSYCHIATRIC DISORDER: ICD-10-CM

## 2021-10-26 DIAGNOSIS — I48.0 PAF (PAROXYSMAL ATRIAL FIBRILLATION) (HCC): ICD-10-CM

## 2021-10-26 DIAGNOSIS — E78.2 MIXED HYPERLIPIDEMIA: ICD-10-CM

## 2021-10-26 DIAGNOSIS — Z79.4 TYPE 2 DIABETES MELLITUS WITH COMPLICATION, WITH LONG-TERM CURRENT USE OF INSULIN (HCC): ICD-10-CM

## 2021-10-26 DIAGNOSIS — E11.8 TYPE 2 DIABETES MELLITUS WITH COMPLICATION, WITH LONG-TERM CURRENT USE OF INSULIN (HCC): ICD-10-CM

## 2021-10-26 DIAGNOSIS — I50.9 CHRONIC CONGESTIVE HEART FAILURE, UNSPECIFIED HEART FAILURE TYPE (HCC): Primary | ICD-10-CM

## 2021-10-26 DIAGNOSIS — K21.9 GASTROESOPHAGEAL REFLUX DISEASE, UNSPECIFIED WHETHER ESOPHAGITIS PRESENT: ICD-10-CM

## 2021-10-26 DIAGNOSIS — G47.33 OBSTRUCTIVE SLEEP APNEA: ICD-10-CM

## 2021-10-26 PROCEDURE — 99213 OFFICE O/P EST LOW 20 MIN: CPT | Performed by: FAMILY MEDICINE

## 2021-11-02 DIAGNOSIS — E78.5 HYPERLIPIDEMIA, UNSPECIFIED HYPERLIPIDEMIA TYPE: ICD-10-CM

## 2021-11-02 RX ORDER — ATORVASTATIN CALCIUM 80 MG/1
TABLET, FILM COATED ORAL
Qty: 90 TABLET | Refills: 3 | Status: SHIPPED | OUTPATIENT
Start: 2021-11-02

## 2021-11-04 DIAGNOSIS — I48.91 ATRIAL FIBRILLATION WITH RVR (HCC): ICD-10-CM

## 2021-11-04 RX ORDER — DIGOXIN 250 MCG
TABLET ORAL
Qty: 30 TABLET | Refills: 5 | Status: SHIPPED | OUTPATIENT
Start: 2021-11-04 | End: 2022-03-31

## 2021-11-06 DIAGNOSIS — E11.65 UNCONTROLLED TYPE 2 DIABETES MELLITUS WITH HYPERGLYCEMIA (HCC): Chronic | ICD-10-CM

## 2021-11-06 RX ORDER — INSULIN ASPART 100 [IU]/ML
INJECTION, SOLUTION INTRAVENOUS; SUBCUTANEOUS
Qty: 30 ML | Refills: 3 | Status: SHIPPED | OUTPATIENT
Start: 2021-11-06 | End: 2022-01-18

## 2021-11-09 DIAGNOSIS — Z79.4 TYPE 2 DIABETES MELLITUS WITH COMPLICATION, WITH LONG-TERM CURRENT USE OF INSULIN (HCC): ICD-10-CM

## 2021-11-09 DIAGNOSIS — E11.8 TYPE 2 DIABETES MELLITUS WITH COMPLICATION, WITH LONG-TERM CURRENT USE OF INSULIN (HCC): ICD-10-CM

## 2021-11-09 RX ORDER — LIRAGLUTIDE 6 MG/ML
INJECTION SUBCUTANEOUS
Qty: 9 ML | Refills: 1 | Status: CANCELLED | OUTPATIENT
Start: 2021-11-09

## 2021-11-19 DIAGNOSIS — E11.65 UNCONTROLLED TYPE 2 DIABETES MELLITUS WITH HYPERGLYCEMIA (HCC): ICD-10-CM

## 2021-11-19 RX ORDER — GABAPENTIN 100 MG/1
CAPSULE ORAL
Qty: 150 CAPSULE | Refills: 3 | Status: SHIPPED | OUTPATIENT
Start: 2021-11-19 | End: 2021-11-29 | Stop reason: HOSPADM

## 2021-11-25 ENCOUNTER — HOSPITAL ENCOUNTER (INPATIENT)
Facility: HOSPITAL | Age: 61
LOS: 2 days | Discharge: NURSING, REHAB OR LONG-TERM CARE | DRG: 552 | End: 2021-11-29
Attending: EMERGENCY MEDICINE | Admitting: STUDENT IN AN ORGANIZED HEALTH CARE EDUCATION/TRAINING PROGRAM
Payer: MEDICARE

## 2021-11-25 DIAGNOSIS — R39.198 URINARY DYSFUNCTION: ICD-10-CM

## 2021-11-25 DIAGNOSIS — G62.9 PERIPHERAL NEUROPATHY: ICD-10-CM

## 2021-11-25 DIAGNOSIS — G89.29 CHRONIC BACK PAIN: Primary | ICD-10-CM

## 2021-11-25 DIAGNOSIS — M54.9 CHRONIC BACK PAIN: Primary | ICD-10-CM

## 2021-11-25 DIAGNOSIS — E11.65 UNCONTROLLED TYPE 2 DIABETES MELLITUS WITH HYPERGLYCEMIA (HCC): ICD-10-CM

## 2021-11-25 PROCEDURE — 99285 EMERGENCY DEPT VISIT HI MDM: CPT

## 2021-11-26 ENCOUNTER — APPOINTMENT (OUTPATIENT)
Dept: RADIOLOGY | Facility: HOSPITAL | Age: 61
DRG: 552 | End: 2021-11-26
Payer: MEDICARE

## 2021-11-26 PROBLEM — G89.29 CHRONIC BILATERAL LOW BACK PAIN WITHOUT SCIATICA: Status: ACTIVE | Noted: 2021-11-26

## 2021-11-26 PROBLEM — M54.50 CHRONIC BILATERAL LOW BACK PAIN WITHOUT SCIATICA: Status: ACTIVE | Noted: 2021-11-26

## 2021-11-26 PROBLEM — G62.9 PERIPHERAL NEUROPATHY: Status: ACTIVE | Noted: 2021-11-26

## 2021-11-26 PROBLEM — I50.43 ACUTE ON CHRONIC COMBINED SYSTOLIC AND DIASTOLIC HEART FAILURE (HCC): Status: RESOLVED | Noted: 2020-02-03 | Resolved: 2021-11-26

## 2021-11-26 PROBLEM — J20.9 ACUTE BRONCHITIS, UNSPECIFIED: Status: RESOLVED | Noted: 2020-03-31 | Resolved: 2021-11-26

## 2021-11-26 LAB
ALBUMIN SERPL BCP-MCNC: 3.5 G/DL (ref 3.5–5)
ALP SERPL-CCNC: 73 U/L (ref 46–116)
ALT SERPL W P-5'-P-CCNC: 39 U/L (ref 12–78)
ANION GAP SERPL CALCULATED.3IONS-SCNC: 10 MMOL/L (ref 4–13)
AST SERPL W P-5'-P-CCNC: 27 U/L (ref 5–45)
BASOPHILS # BLD AUTO: 0.03 THOUSANDS/ΜL (ref 0–0.1)
BASOPHILS NFR BLD AUTO: 0 % (ref 0–1)
BILIRUB SERPL-MCNC: 0.32 MG/DL (ref 0.2–1)
BUN SERPL-MCNC: 24 MG/DL (ref 5–25)
CALCIUM SERPL-MCNC: 9.2 MG/DL (ref 8.3–10.1)
CHLORIDE SERPL-SCNC: 102 MMOL/L (ref 100–108)
CO2 SERPL-SCNC: 27 MMOL/L (ref 21–32)
CREAT SERPL-MCNC: 1.04 MG/DL (ref 0.6–1.3)
EOSINOPHIL # BLD AUTO: 0.07 THOUSAND/ΜL (ref 0–0.61)
EOSINOPHIL NFR BLD AUTO: 1 % (ref 0–6)
ERYTHROCYTE [DISTWIDTH] IN BLOOD BY AUTOMATED COUNT: 12.2 % (ref 11.6–15.1)
FLUAV RNA RESP QL NAA+PROBE: NEGATIVE
FLUBV RNA RESP QL NAA+PROBE: NEGATIVE
GFR SERPL CREATININE-BSD FRML MDRD: 77 ML/MIN/1.73SQ M
GLUCOSE SERPL-MCNC: 126 MG/DL (ref 65–140)
GLUCOSE SERPL-MCNC: 136 MG/DL (ref 65–140)
GLUCOSE SERPL-MCNC: 189 MG/DL (ref 65–140)
GLUCOSE SERPL-MCNC: 241 MG/DL (ref 65–140)
GLUCOSE SERPL-MCNC: 298 MG/DL (ref 65–140)
HCT VFR BLD AUTO: 36.2 % (ref 36.5–49.3)
HGB BLD-MCNC: 12.5 G/DL (ref 12–17)
IMM GRANULOCYTES # BLD AUTO: 0.03 THOUSAND/UL (ref 0–0.2)
IMM GRANULOCYTES NFR BLD AUTO: 0 % (ref 0–2)
LYMPHOCYTES # BLD AUTO: 5.58 THOUSANDS/ΜL (ref 0.6–4.47)
LYMPHOCYTES NFR BLD AUTO: 60 % (ref 14–44)
MCH RBC QN AUTO: 32.9 PG (ref 26.8–34.3)
MCHC RBC AUTO-ENTMCNC: 34.5 G/DL (ref 31.4–37.4)
MCV RBC AUTO: 95 FL (ref 82–98)
MONOCYTES # BLD AUTO: 0.5 THOUSAND/ΜL (ref 0.17–1.22)
MONOCYTES NFR BLD AUTO: 5 % (ref 4–12)
NEUTROPHILS # BLD AUTO: 3.22 THOUSANDS/ΜL (ref 1.85–7.62)
NEUTS SEG NFR BLD AUTO: 34 % (ref 43–75)
NRBC BLD AUTO-RTO: 0 /100 WBCS
PLATELET # BLD AUTO: 146 THOUSANDS/UL (ref 149–390)
PMV BLD AUTO: 9.4 FL (ref 8.9–12.7)
POTASSIUM SERPL-SCNC: 4.3 MMOL/L (ref 3.5–5.3)
PROT SERPL-MCNC: 6.1 G/DL (ref 6.4–8.2)
RBC # BLD AUTO: 3.8 MILLION/UL (ref 3.88–5.62)
RSV RNA RESP QL NAA+PROBE: NEGATIVE
SARS-COV-2 RNA RESP QL NAA+PROBE: NEGATIVE
SODIUM SERPL-SCNC: 139 MMOL/L (ref 136–145)
TSH SERPL DL<=0.05 MIU/L-ACNC: 1.72 UIU/ML (ref 0.36–3.74)
WBC # BLD AUTO: 9.43 THOUSAND/UL (ref 4.31–10.16)

## 2021-11-26 PROCEDURE — G1004 CDSM NDSC: HCPCS

## 2021-11-26 PROCEDURE — 96375 TX/PRO/DX INJ NEW DRUG ADDON: CPT

## 2021-11-26 PROCEDURE — 70450 CT HEAD/BRAIN W/O DYE: CPT

## 2021-11-26 PROCEDURE — 82607 VITAMIN B-12: CPT | Performed by: STUDENT IN AN ORGANIZED HEALTH CARE EDUCATION/TRAINING PROGRAM

## 2021-11-26 PROCEDURE — 80053 COMPREHEN METABOLIC PANEL: CPT | Performed by: NURSE PRACTITIONER

## 2021-11-26 PROCEDURE — 36415 COLL VENOUS BLD VENIPUNCTURE: CPT | Performed by: NURSE PRACTITIONER

## 2021-11-26 PROCEDURE — 94760 N-INVAS EAR/PLS OXIMETRY 1: CPT

## 2021-11-26 PROCEDURE — 90682 RIV4 VACC RECOMBINANT DNA IM: CPT | Performed by: STUDENT IN AN ORGANIZED HEALTH CARE EDUCATION/TRAINING PROGRAM

## 2021-11-26 PROCEDURE — G0008 ADMIN INFLUENZA VIRUS VAC: HCPCS | Performed by: STUDENT IN AN ORGANIZED HEALTH CARE EDUCATION/TRAINING PROGRAM

## 2021-11-26 PROCEDURE — 82948 REAGENT STRIP/BLOOD GLUCOSE: CPT

## 2021-11-26 PROCEDURE — 74176 CT ABD & PELVIS W/O CONTRAST: CPT

## 2021-11-26 PROCEDURE — 99220 PR INITIAL OBSERVATION CARE/DAY 70 MINUTES: CPT | Performed by: STUDENT IN AN ORGANIZED HEALTH CARE EDUCATION/TRAINING PROGRAM

## 2021-11-26 PROCEDURE — 84443 ASSAY THYROID STIM HORMONE: CPT | Performed by: STUDENT IN AN ORGANIZED HEALTH CARE EDUCATION/TRAINING PROGRAM

## 2021-11-26 PROCEDURE — 0241U HB NFCT DS VIR RESP RNA 4 TRGT: CPT | Performed by: NURSE PRACTITIONER

## 2021-11-26 PROCEDURE — 97166 OT EVAL MOD COMPLEX 45 MIN: CPT

## 2021-11-26 PROCEDURE — 72148 MRI LUMBAR SPINE W/O DYE: CPT

## 2021-11-26 PROCEDURE — 94664 DEMO&/EVAL PT USE INHALER: CPT

## 2021-11-26 PROCEDURE — 94660 CPAP INITIATION&MGMT: CPT

## 2021-11-26 PROCEDURE — 85025 COMPLETE CBC W/AUTO DIFF WBC: CPT | Performed by: NURSE PRACTITIONER

## 2021-11-26 PROCEDURE — 94640 AIRWAY INHALATION TREATMENT: CPT

## 2021-11-26 PROCEDURE — 96374 THER/PROPH/DIAG INJ IV PUSH: CPT

## 2021-11-26 PROCEDURE — 99285 EMERGENCY DEPT VISIT HI MDM: CPT | Performed by: EMERGENCY MEDICINE

## 2021-11-26 RX ORDER — LIDOCAINE 50 MG/G
2 PATCH TOPICAL ONCE
Status: COMPLETED | OUTPATIENT
Start: 2021-11-26 | End: 2021-11-26

## 2021-11-26 RX ORDER — TAMSULOSIN HYDROCHLORIDE 0.4 MG/1
0.4 CAPSULE ORAL
Status: DISCONTINUED | OUTPATIENT
Start: 2021-11-26 | End: 2021-11-29 | Stop reason: HOSPADM

## 2021-11-26 RX ORDER — ALPRAZOLAM 0.5 MG/1
2 TABLET ORAL
Status: DISCONTINUED | OUTPATIENT
Start: 2021-11-26 | End: 2021-11-29 | Stop reason: HOSPADM

## 2021-11-26 RX ORDER — KETAMINE HCL IN NACL, ISO-OSM 100MG/10ML
20 SYRINGE (ML) INJECTION ONCE
Status: COMPLETED | OUTPATIENT
Start: 2021-11-26 | End: 2021-11-26

## 2021-11-26 RX ORDER — ALBUTEROL SULFATE 2.5 MG/3ML
2.5 SOLUTION RESPIRATORY (INHALATION) EVERY 6 HOURS PRN
Status: DISCONTINUED | OUTPATIENT
Start: 2021-11-26 | End: 2021-11-29 | Stop reason: HOSPADM

## 2021-11-26 RX ORDER — OXYCODONE HYDROCHLORIDE 10 MG/1
10 TABLET ORAL EVERY 4 HOURS PRN
Status: DISCONTINUED | OUTPATIENT
Start: 2021-11-26 | End: 2021-11-28

## 2021-11-26 RX ORDER — ATORVASTATIN CALCIUM 80 MG/1
80 TABLET, FILM COATED ORAL
Status: DISCONTINUED | OUTPATIENT
Start: 2021-11-26 | End: 2021-11-29 | Stop reason: HOSPADM

## 2021-11-26 RX ORDER — ACETAMINOPHEN 325 MG/1
650 TABLET ORAL EVERY 6 HOURS PRN
Status: DISCONTINUED | OUTPATIENT
Start: 2021-11-26 | End: 2021-11-26

## 2021-11-26 RX ORDER — BUSPIRONE HYDROCHLORIDE 10 MG/1
10 TABLET ORAL 2 TIMES DAILY
Status: DISCONTINUED | OUTPATIENT
Start: 2021-11-26 | End: 2021-11-29 | Stop reason: HOSPADM

## 2021-11-26 RX ORDER — LOSARTAN POTASSIUM 50 MG/1
50 TABLET ORAL DAILY
Status: DISCONTINUED | OUTPATIENT
Start: 2021-11-26 | End: 2021-11-29 | Stop reason: HOSPADM

## 2021-11-26 RX ORDER — ASPIRIN 81 MG/1
81 TABLET, CHEWABLE ORAL DAILY
Status: DISCONTINUED | OUTPATIENT
Start: 2021-11-26 | End: 2021-11-29 | Stop reason: HOSPADM

## 2021-11-26 RX ORDER — CHLORAL HYDRATE 500 MG
2000 CAPSULE ORAL 2 TIMES DAILY
Status: DISCONTINUED | OUTPATIENT
Start: 2021-11-26 | End: 2021-11-29 | Stop reason: HOSPADM

## 2021-11-26 RX ORDER — GABAPENTIN 100 MG/1
100 CAPSULE ORAL 3 TIMES DAILY
Status: DISCONTINUED | OUTPATIENT
Start: 2021-11-26 | End: 2021-11-26

## 2021-11-26 RX ORDER — DIAZEPAM 5 MG/ML
5 INJECTION, SOLUTION INTRAMUSCULAR; INTRAVENOUS ONCE
Status: COMPLETED | OUTPATIENT
Start: 2021-11-26 | End: 2021-11-26

## 2021-11-26 RX ORDER — DIGOXIN 125 MCG
250 TABLET ORAL DAILY
Status: DISCONTINUED | OUTPATIENT
Start: 2021-11-26 | End: 2021-11-29 | Stop reason: HOSPADM

## 2021-11-26 RX ORDER — FLUTICASONE FUROATE AND VILANTEROL 100; 25 UG/1; UG/1
1 POWDER RESPIRATORY (INHALATION) DAILY
Status: DISCONTINUED | OUTPATIENT
Start: 2021-11-26 | End: 2021-11-29 | Stop reason: HOSPADM

## 2021-11-26 RX ORDER — KETOROLAC TROMETHAMINE 30 MG/ML
15 INJECTION, SOLUTION INTRAMUSCULAR; INTRAVENOUS ONCE
Status: COMPLETED | OUTPATIENT
Start: 2021-11-26 | End: 2021-11-26

## 2021-11-26 RX ORDER — GABAPENTIN 100 MG/1
200 CAPSULE ORAL 3 TIMES DAILY
Status: DISCONTINUED | OUTPATIENT
Start: 2021-11-26 | End: 2021-11-29 | Stop reason: HOSPADM

## 2021-11-26 RX ORDER — FENTANYL CITRATE 50 UG/ML
50 INJECTION, SOLUTION INTRAMUSCULAR; INTRAVENOUS ONCE
Status: COMPLETED | OUTPATIENT
Start: 2021-11-26 | End: 2021-11-26

## 2021-11-26 RX ORDER — ASCORBIC ACID 500 MG
1000 TABLET ORAL DAILY
Status: DISCONTINUED | OUTPATIENT
Start: 2021-11-26 | End: 2021-11-29 | Stop reason: HOSPADM

## 2021-11-26 RX ORDER — ACETAMINOPHEN 325 MG/1
975 TABLET ORAL EVERY 8 HOURS SCHEDULED
Status: DISCONTINUED | OUTPATIENT
Start: 2021-11-26 | End: 2021-11-29 | Stop reason: HOSPADM

## 2021-11-26 RX ORDER — METOPROLOL SUCCINATE 100 MG/1
200 TABLET, EXTENDED RELEASE ORAL DAILY
Status: DISCONTINUED | OUTPATIENT
Start: 2021-11-26 | End: 2021-11-29 | Stop reason: HOSPADM

## 2021-11-26 RX ORDER — QUETIAPINE 200 MG/1
200 TABLET, FILM COATED, EXTENDED RELEASE ORAL EVERY MORNING
Status: DISCONTINUED | OUTPATIENT
Start: 2021-11-26 | End: 2021-11-29 | Stop reason: HOSPADM

## 2021-11-26 RX ORDER — INSULIN GLARGINE 100 [IU]/ML
75 INJECTION, SOLUTION SUBCUTANEOUS EVERY 12 HOURS SCHEDULED
Status: DISCONTINUED | OUTPATIENT
Start: 2021-11-26 | End: 2021-11-29 | Stop reason: HOSPADM

## 2021-11-26 RX ORDER — SPIRONOLACTONE 25 MG/1
25 TABLET ORAL DAILY
Status: DISCONTINUED | OUTPATIENT
Start: 2021-11-26 | End: 2021-11-29 | Stop reason: HOSPADM

## 2021-11-26 RX ORDER — IBUPROFEN 600 MG/1
600 TABLET ORAL EVERY 6 HOURS PRN
Status: DISCONTINUED | OUTPATIENT
Start: 2021-11-26 | End: 2021-11-29 | Stop reason: HOSPADM

## 2021-11-26 RX ORDER — OXYCODONE HYDROCHLORIDE 5 MG/1
5 TABLET ORAL EVERY 4 HOURS PRN
Status: DISCONTINUED | OUTPATIENT
Start: 2021-11-26 | End: 2021-11-29

## 2021-11-26 RX ORDER — QUETIAPINE FUMARATE 100 MG/1
300 TABLET, FILM COATED ORAL
Status: DISCONTINUED | OUTPATIENT
Start: 2021-11-26 | End: 2021-11-29 | Stop reason: HOSPADM

## 2021-11-26 RX ORDER — MELATONIN
1000 DAILY
Status: DISCONTINUED | OUTPATIENT
Start: 2021-11-26 | End: 2021-11-29 | Stop reason: HOSPADM

## 2021-11-26 RX ORDER — METHOCARBAMOL 500 MG/1
500 TABLET, FILM COATED ORAL EVERY 6 HOURS SCHEDULED
Status: DISCONTINUED | OUTPATIENT
Start: 2021-11-26 | End: 2021-11-29 | Stop reason: HOSPADM

## 2021-11-26 RX ORDER — HYDROMORPHONE HCL/PF 1 MG/ML
0.5 SYRINGE (ML) INJECTION
Status: DISCONTINUED | OUTPATIENT
Start: 2021-11-26 | End: 2021-11-29 | Stop reason: HOSPADM

## 2021-11-26 RX ORDER — TORSEMIDE 20 MG/1
20 TABLET ORAL 2 TIMES DAILY
Status: DISCONTINUED | OUTPATIENT
Start: 2021-11-26 | End: 2021-11-29 | Stop reason: HOSPADM

## 2021-11-26 RX ORDER — HYDROMORPHONE HCL/PF 1 MG/ML
1 SYRINGE (ML) INJECTION ONCE
Status: DISCONTINUED | OUTPATIENT
Start: 2021-11-26 | End: 2021-11-26

## 2021-11-26 RX ORDER — IPRATROPIUM BROMIDE AND ALBUTEROL SULFATE 2.5; .5 MG/3ML; MG/3ML
3 SOLUTION RESPIRATORY (INHALATION)
Status: DISCONTINUED | OUTPATIENT
Start: 2021-11-26 | End: 2021-11-28

## 2021-11-26 RX ADMIN — ALPRAZOLAM 2 MG: 0.5 TABLET ORAL at 21:38

## 2021-11-26 RX ADMIN — IPRATROPIUM BROMIDE AND ALBUTEROL SULFATE 3 ML: 2.5; .5 SOLUTION RESPIRATORY (INHALATION) at 07:16

## 2021-11-26 RX ADMIN — IPRATROPIUM BROMIDE AND ALBUTEROL SULFATE 3 ML: 2.5; .5 SOLUTION RESPIRATORY (INHALATION) at 20:27

## 2021-11-26 RX ADMIN — BUSPIRONE HYDROCHLORIDE 10 MG: 10 TABLET ORAL at 08:24

## 2021-11-26 RX ADMIN — OXYCODONE HYDROCHLORIDE 10 MG: 10 TABLET ORAL at 20:29

## 2021-11-26 RX ADMIN — METHOCARBAMOL 500 MG: 500 TABLET ORAL at 11:53

## 2021-11-26 RX ADMIN — ACETAMINOPHEN 975 MG: 325 TABLET, FILM COATED ORAL at 21:38

## 2021-11-26 RX ADMIN — HYDROMORPHONE HYDROCHLORIDE 0.5 MG: 1 INJECTION, SOLUTION INTRAMUSCULAR; INTRAVENOUS; SUBCUTANEOUS at 18:15

## 2021-11-26 RX ADMIN — METOPROLOL SUCCINATE 200 MG: 100 TABLET, EXTENDED RELEASE ORAL at 08:24

## 2021-11-26 RX ADMIN — INSULIN LISPRO 4 UNITS: 100 INJECTION, SOLUTION INTRAVENOUS; SUBCUTANEOUS at 17:43

## 2021-11-26 RX ADMIN — HYDROMORPHONE HYDROCHLORIDE 0.5 MG: 1 INJECTION, SOLUTION INTRAMUSCULAR; INTRAVENOUS; SUBCUTANEOUS at 13:45

## 2021-11-26 RX ADMIN — Medication 20 MG: at 02:03

## 2021-11-26 RX ADMIN — TORSEMIDE 20 MG: 20 TABLET ORAL at 08:24

## 2021-11-26 RX ADMIN — OMEGA-3 FATTY ACIDS CAP 1000 MG 2000 MG: 1000 CAP at 08:24

## 2021-11-26 RX ADMIN — ASPIRIN 81 MG CHEWABLE TABLET 81 MG: 81 TABLET CHEWABLE at 08:24

## 2021-11-26 RX ADMIN — Medication 1000 UNITS: at 08:25

## 2021-11-26 RX ADMIN — SERTRALINE HYDROCHLORIDE 50 MG: 50 TABLET ORAL at 08:24

## 2021-11-26 RX ADMIN — DIAZEPAM 5 MG: 10 INJECTION, SOLUTION INTRAMUSCULAR; INTRAVENOUS at 00:32

## 2021-11-26 RX ADMIN — IPRATROPIUM BROMIDE AND ALBUTEROL SULFATE 3 ML: 2.5; .5 SOLUTION RESPIRATORY (INHALATION) at 13:26

## 2021-11-26 RX ADMIN — INFLUENZA A VIRUS A/WISCONSIN/588/2019 (H1N1) RECOMBINANT HEMAGGLUTININ ANTIGEN, INFLUENZA A VIRUS A/TASMANIA/503/2020 (H3N2) RECOMBINANT HEMAGGLUTININ ANTIGEN, INFLUENZA B VIRUS B/WASHINGTON/02/2019 RECOMBINANT HEMAGGLUTININ ANTIGEN, AND INFLUENZA B VIRUS B/PHUKET/3073/2013 RECOMBINANT HEMAGGLUTININ ANTIGEN 0.5 ML: 45; 45; 45; 45 INJECTION INTRAMUSCULAR at 13:47

## 2021-11-26 RX ADMIN — QUETIAPINE FUMARATE 300 MG: 100 TABLET ORAL at 21:38

## 2021-11-26 RX ADMIN — LOSARTAN POTASSIUM 50 MG: 50 TABLET, FILM COATED ORAL at 08:24

## 2021-11-26 RX ADMIN — ACETAMINOPHEN 975 MG: 325 TABLET, FILM COATED ORAL at 13:36

## 2021-11-26 RX ADMIN — OXYCODONE HYDROCHLORIDE 10 MG: 10 TABLET ORAL at 10:39

## 2021-11-26 RX ADMIN — APIXABAN 5 MG: 5 TABLET, FILM COATED ORAL at 08:28

## 2021-11-26 RX ADMIN — OMEGA-3 FATTY ACIDS CAP 1000 MG 2000 MG: 1000 CAP at 17:42

## 2021-11-26 RX ADMIN — APIXABAN 5 MG: 5 TABLET, FILM COATED ORAL at 17:42

## 2021-11-26 RX ADMIN — TAMSULOSIN HYDROCHLORIDE 0.4 MG: 0.4 CAPSULE ORAL at 18:24

## 2021-11-26 RX ADMIN — SPIRONOLACTONE 25 MG: 25 TABLET ORAL at 08:24

## 2021-11-26 RX ADMIN — Medication 1 TABLET: at 08:24

## 2021-11-26 RX ADMIN — IPRATROPIUM BROMIDE AND ALBUTEROL SULFATE 3 ML: 2.5; .5 SOLUTION RESPIRATORY (INHALATION) at 02:08

## 2021-11-26 RX ADMIN — BUSPIRONE HYDROCHLORIDE 10 MG: 10 TABLET ORAL at 17:42

## 2021-11-26 RX ADMIN — INSULIN GLARGINE 75 UNITS: 100 INJECTION, SOLUTION SUBCUTANEOUS at 08:28

## 2021-11-26 RX ADMIN — INSULIN GLARGINE 75 UNITS: 100 INJECTION, SOLUTION SUBCUTANEOUS at 21:39

## 2021-11-26 RX ADMIN — FENTANYL CITRATE 50 MCG: 50 INJECTION INTRAMUSCULAR; INTRAVENOUS at 00:30

## 2021-11-26 RX ADMIN — ACETAMINOPHEN 975 MG: 325 TABLET, FILM COATED ORAL at 08:23

## 2021-11-26 RX ADMIN — OXYCODONE HYDROCHLORIDE AND ACETAMINOPHEN 1000 MG: 500 TABLET ORAL at 08:25

## 2021-11-26 RX ADMIN — GABAPENTIN 100 MG: 100 CAPSULE ORAL at 15:24

## 2021-11-26 RX ADMIN — INSULIN LISPRO 6 UNITS: 100 INJECTION, SOLUTION INTRAVENOUS; SUBCUTANEOUS at 12:30

## 2021-11-26 RX ADMIN — INSULIN LISPRO 1 UNITS: 100 INJECTION, SOLUTION INTRAVENOUS; SUBCUTANEOUS at 21:40

## 2021-11-26 RX ADMIN — OXYCODONE HYDROCHLORIDE 10 MG: 10 TABLET ORAL at 15:25

## 2021-11-26 RX ADMIN — GABAPENTIN 100 MG: 100 CAPSULE ORAL at 08:25

## 2021-11-26 RX ADMIN — KETOROLAC TROMETHAMINE 15 MG: 30 INJECTION, SOLUTION INTRAMUSCULAR at 00:34

## 2021-11-26 RX ADMIN — DIGOXIN 250 MCG: 125 TABLET ORAL at 08:23

## 2021-11-26 RX ADMIN — TORSEMIDE 20 MG: 20 TABLET ORAL at 20:29

## 2021-11-26 RX ADMIN — METHOCARBAMOL 500 MG: 500 TABLET ORAL at 08:25

## 2021-11-26 RX ADMIN — GABAPENTIN 200 MG: 100 CAPSULE ORAL at 20:28

## 2021-11-26 RX ADMIN — QUETIAPINE FUMARATE 200 MG: 200 TABLET, EXTENDED RELEASE ORAL at 08:26

## 2021-11-26 RX ADMIN — ATORVASTATIN CALCIUM 80 MG: 80 TABLET ORAL at 17:42

## 2021-11-26 RX ADMIN — METHOCARBAMOL 500 MG: 500 TABLET ORAL at 17:42

## 2021-11-26 RX ADMIN — LIDOCAINE 5% 2 PATCH: 700 PATCH TOPICAL at 00:34

## 2021-11-26 RX ADMIN — FLUTICASONE FUROATE AND VILANTEROL TRIFENATATE 1 PUFF: 100; 25 POWDER RESPIRATORY (INHALATION) at 08:26

## 2021-11-27 LAB
ANION GAP SERPL CALCULATED.3IONS-SCNC: 10 MMOL/L (ref 4–13)
BASOPHILS # BLD AUTO: 0.03 THOUSANDS/ΜL (ref 0–0.1)
BASOPHILS NFR BLD AUTO: 0 % (ref 0–1)
BUN SERPL-MCNC: 21 MG/DL (ref 5–25)
CALCIUM SERPL-MCNC: 9.6 MG/DL (ref 8.3–10.1)
CHLORIDE SERPL-SCNC: 98 MMOL/L (ref 100–108)
CO2 SERPL-SCNC: 30 MMOL/L (ref 21–32)
CREAT SERPL-MCNC: 1.07 MG/DL (ref 0.6–1.3)
EOSINOPHIL # BLD AUTO: 0.11 THOUSAND/ΜL (ref 0–0.61)
EOSINOPHIL NFR BLD AUTO: 1 % (ref 0–6)
ERYTHROCYTE [DISTWIDTH] IN BLOOD BY AUTOMATED COUNT: 12.3 % (ref 11.6–15.1)
GFR SERPL CREATININE-BSD FRML MDRD: 74 ML/MIN/1.73SQ M
GLUCOSE SERPL-MCNC: 182 MG/DL (ref 65–140)
GLUCOSE SERPL-MCNC: 187 MG/DL (ref 65–140)
GLUCOSE SERPL-MCNC: 193 MG/DL (ref 65–140)
GLUCOSE SERPL-MCNC: 209 MG/DL (ref 65–140)
GLUCOSE SERPL-MCNC: 221 MG/DL (ref 65–140)
GLUCOSE SERPL-MCNC: 225 MG/DL (ref 65–140)
HCT VFR BLD AUTO: 40.6 % (ref 36.5–49.3)
HGB BLD-MCNC: 13.6 G/DL (ref 12–17)
IMM GRANULOCYTES # BLD AUTO: 0.05 THOUSAND/UL (ref 0–0.2)
IMM GRANULOCYTES NFR BLD AUTO: 1 % (ref 0–2)
LYMPHOCYTES # BLD AUTO: 5.79 THOUSANDS/ΜL (ref 0.6–4.47)
LYMPHOCYTES NFR BLD AUTO: 57 % (ref 14–44)
MCH RBC QN AUTO: 32.2 PG (ref 26.8–34.3)
MCHC RBC AUTO-ENTMCNC: 33.5 G/DL (ref 31.4–37.4)
MCV RBC AUTO: 96 FL (ref 82–98)
MONOCYTES # BLD AUTO: 0.48 THOUSAND/ΜL (ref 0.17–1.22)
MONOCYTES NFR BLD AUTO: 5 % (ref 4–12)
NEUTROPHILS # BLD AUTO: 3.7 THOUSANDS/ΜL (ref 1.85–7.62)
NEUTS SEG NFR BLD AUTO: 36 % (ref 43–75)
NRBC BLD AUTO-RTO: 0 /100 WBCS
PLATELET # BLD AUTO: 165 THOUSANDS/UL (ref 149–390)
PMV BLD AUTO: 9.7 FL (ref 8.9–12.7)
POTASSIUM SERPL-SCNC: 4.1 MMOL/L (ref 3.5–5.3)
RBC # BLD AUTO: 4.23 MILLION/UL (ref 3.88–5.62)
SODIUM SERPL-SCNC: 138 MMOL/L (ref 136–145)
VIT B12 SERPL-MCNC: 364 PG/ML (ref 100–900)
WBC # BLD AUTO: 10.16 THOUSAND/UL (ref 4.31–10.16)

## 2021-11-27 PROCEDURE — 97110 THERAPEUTIC EXERCISES: CPT

## 2021-11-27 PROCEDURE — 82948 REAGENT STRIP/BLOOD GLUCOSE: CPT

## 2021-11-27 PROCEDURE — 99232 SBSQ HOSP IP/OBS MODERATE 35: CPT | Performed by: STUDENT IN AN ORGANIZED HEALTH CARE EDUCATION/TRAINING PROGRAM

## 2021-11-27 PROCEDURE — 80048 BASIC METABOLIC PNL TOTAL CA: CPT | Performed by: STUDENT IN AN ORGANIZED HEALTH CARE EDUCATION/TRAINING PROGRAM

## 2021-11-27 PROCEDURE — 94760 N-INVAS EAR/PLS OXIMETRY 1: CPT

## 2021-11-27 PROCEDURE — 94660 CPAP INITIATION&MGMT: CPT

## 2021-11-27 PROCEDURE — 94640 AIRWAY INHALATION TREATMENT: CPT

## 2021-11-27 PROCEDURE — 97163 PT EVAL HIGH COMPLEX 45 MIN: CPT

## 2021-11-27 PROCEDURE — 85025 COMPLETE CBC W/AUTO DIFF WBC: CPT | Performed by: STUDENT IN AN ORGANIZED HEALTH CARE EDUCATION/TRAINING PROGRAM

## 2021-11-27 RX ADMIN — QUETIAPINE FUMARATE 200 MG: 200 TABLET, EXTENDED RELEASE ORAL at 09:09

## 2021-11-27 RX ADMIN — METHOCARBAMOL 500 MG: 500 TABLET ORAL at 07:26

## 2021-11-27 RX ADMIN — OXYCODONE HYDROCHLORIDE AND ACETAMINOPHEN 1000 MG: 500 TABLET ORAL at 09:06

## 2021-11-27 RX ADMIN — OMEGA-3 FATTY ACIDS CAP 1000 MG 2000 MG: 1000 CAP at 17:50

## 2021-11-27 RX ADMIN — INSULIN GLARGINE 75 UNITS: 100 INJECTION, SOLUTION SUBCUTANEOUS at 22:20

## 2021-11-27 RX ADMIN — ACETAMINOPHEN 975 MG: 325 TABLET, FILM COATED ORAL at 14:43

## 2021-11-27 RX ADMIN — TAMSULOSIN HYDROCHLORIDE 0.4 MG: 0.4 CAPSULE ORAL at 17:50

## 2021-11-27 RX ADMIN — TORSEMIDE 20 MG: 20 TABLET ORAL at 21:32

## 2021-11-27 RX ADMIN — IPRATROPIUM BROMIDE AND ALBUTEROL SULFATE 3 ML: 2.5; .5 SOLUTION RESPIRATORY (INHALATION) at 07:13

## 2021-11-27 RX ADMIN — IPRATROPIUM BROMIDE AND ALBUTEROL SULFATE 3 ML: 2.5; .5 SOLUTION RESPIRATORY (INHALATION) at 20:06

## 2021-11-27 RX ADMIN — LOSARTAN POTASSIUM 50 MG: 50 TABLET, FILM COATED ORAL at 09:08

## 2021-11-27 RX ADMIN — ASPIRIN 81 MG CHEWABLE TABLET 81 MG: 81 TABLET CHEWABLE at 09:08

## 2021-11-27 RX ADMIN — ATORVASTATIN CALCIUM 80 MG: 80 TABLET ORAL at 17:50

## 2021-11-27 RX ADMIN — METOPROLOL SUCCINATE 200 MG: 100 TABLET, EXTENDED RELEASE ORAL at 09:07

## 2021-11-27 RX ADMIN — GABAPENTIN 200 MG: 100 CAPSULE ORAL at 22:20

## 2021-11-27 RX ADMIN — OXYCODONE HYDROCHLORIDE 10 MG: 10 TABLET ORAL at 21:25

## 2021-11-27 RX ADMIN — OXYCODONE HYDROCHLORIDE 10 MG: 10 TABLET ORAL at 04:42

## 2021-11-27 RX ADMIN — ACETAMINOPHEN 975 MG: 325 TABLET, FILM COATED ORAL at 07:26

## 2021-11-27 RX ADMIN — METHOCARBAMOL 500 MG: 500 TABLET ORAL at 00:23

## 2021-11-27 RX ADMIN — Medication 1 TABLET: at 09:07

## 2021-11-27 RX ADMIN — FLUTICASONE FUROATE AND VILANTEROL TRIFENATATE 1 PUFF: 100; 25 POWDER RESPIRATORY (INHALATION) at 09:09

## 2021-11-27 RX ADMIN — GABAPENTIN 200 MG: 100 CAPSULE ORAL at 17:50

## 2021-11-27 RX ADMIN — OMEGA-3 FATTY ACIDS CAP 1000 MG 2000 MG: 1000 CAP at 09:00

## 2021-11-27 RX ADMIN — INSULIN LISPRO 4 UNITS: 100 INJECTION, SOLUTION INTRAVENOUS; SUBCUTANEOUS at 17:51

## 2021-11-27 RX ADMIN — QUETIAPINE FUMARATE 300 MG: 100 TABLET ORAL at 21:26

## 2021-11-27 RX ADMIN — DIGOXIN 250 MCG: 125 TABLET ORAL at 09:08

## 2021-11-27 RX ADMIN — APIXABAN 5 MG: 5 TABLET, FILM COATED ORAL at 09:06

## 2021-11-27 RX ADMIN — IPRATROPIUM BROMIDE AND ALBUTEROL SULFATE 3 ML: 2.5; .5 SOLUTION RESPIRATORY (INHALATION) at 14:10

## 2021-11-27 RX ADMIN — GABAPENTIN 200 MG: 100 CAPSULE ORAL at 09:07

## 2021-11-27 RX ADMIN — BUSPIRONE HYDROCHLORIDE 10 MG: 10 TABLET ORAL at 09:07

## 2021-11-27 RX ADMIN — OXYCODONE HYDROCHLORIDE 10 MG: 10 TABLET ORAL at 08:59

## 2021-11-27 RX ADMIN — OXYCODONE HYDROCHLORIDE 10 MG: 10 TABLET ORAL at 14:45

## 2021-11-27 RX ADMIN — INSULIN LISPRO 2 UNITS: 100 INJECTION, SOLUTION INTRAVENOUS; SUBCUTANEOUS at 21:33

## 2021-11-27 RX ADMIN — Medication 1000 UNITS: at 09:07

## 2021-11-27 RX ADMIN — ALPRAZOLAM 2 MG: 0.5 TABLET ORAL at 22:20

## 2021-11-27 RX ADMIN — METHOCARBAMOL 500 MG: 500 TABLET ORAL at 14:45

## 2021-11-27 RX ADMIN — IPRATROPIUM BROMIDE AND ALBUTEROL SULFATE 3 ML: 2.5; .5 SOLUTION RESPIRATORY (INHALATION) at 02:38

## 2021-11-27 RX ADMIN — INSULIN GLARGINE 75 UNITS: 100 INJECTION, SOLUTION SUBCUTANEOUS at 09:07

## 2021-11-27 RX ADMIN — INSULIN LISPRO 2 UNITS: 100 INJECTION, SOLUTION INTRAVENOUS; SUBCUTANEOUS at 09:10

## 2021-11-27 RX ADMIN — SERTRALINE HYDROCHLORIDE 50 MG: 50 TABLET ORAL at 09:06

## 2021-11-27 RX ADMIN — METHOCARBAMOL 500 MG: 500 TABLET ORAL at 17:50

## 2021-11-27 RX ADMIN — SPIRONOLACTONE 25 MG: 25 TABLET ORAL at 09:06

## 2021-11-27 RX ADMIN — INSULIN LISPRO 4 UNITS: 100 INJECTION, SOLUTION INTRAVENOUS; SUBCUTANEOUS at 14:44

## 2021-11-27 RX ADMIN — APIXABAN 5 MG: 5 TABLET, FILM COATED ORAL at 17:50

## 2021-11-27 RX ADMIN — TORSEMIDE 20 MG: 20 TABLET ORAL at 09:00

## 2021-11-27 RX ADMIN — BUSPIRONE HYDROCHLORIDE 10 MG: 10 TABLET ORAL at 17:50

## 2021-11-27 RX ADMIN — ACETAMINOPHEN 975 MG: 325 TABLET, FILM COATED ORAL at 21:26

## 2021-11-28 LAB
GLUCOSE SERPL-MCNC: 155 MG/DL (ref 65–140)
GLUCOSE SERPL-MCNC: 179 MG/DL (ref 65–140)
GLUCOSE SERPL-MCNC: 269 MG/DL (ref 65–140)
GLUCOSE SERPL-MCNC: 293 MG/DL (ref 65–140)
GLUCOSE SERPL-MCNC: 348 MG/DL (ref 65–140)

## 2021-11-28 PROCEDURE — 99232 SBSQ HOSP IP/OBS MODERATE 35: CPT | Performed by: STUDENT IN AN ORGANIZED HEALTH CARE EDUCATION/TRAINING PROGRAM

## 2021-11-28 PROCEDURE — 94640 AIRWAY INHALATION TREATMENT: CPT

## 2021-11-28 PROCEDURE — 82948 REAGENT STRIP/BLOOD GLUCOSE: CPT

## 2021-11-28 PROCEDURE — 94660 CPAP INITIATION&MGMT: CPT

## 2021-11-28 PROCEDURE — 94760 N-INVAS EAR/PLS OXIMETRY 1: CPT

## 2021-11-28 RX ORDER — IPRATROPIUM BROMIDE AND ALBUTEROL SULFATE 2.5; .5 MG/3ML; MG/3ML
3 SOLUTION RESPIRATORY (INHALATION)
Status: DISCONTINUED | OUTPATIENT
Start: 2021-11-29 | End: 2021-11-29 | Stop reason: HOSPADM

## 2021-11-28 RX ADMIN — INSULIN LISPRO 6 UNITS: 100 INJECTION, SOLUTION INTRAVENOUS; SUBCUTANEOUS at 11:30

## 2021-11-28 RX ADMIN — ASPIRIN 81 MG CHEWABLE TABLET 81 MG: 81 TABLET CHEWABLE at 11:31

## 2021-11-28 RX ADMIN — ACETAMINOPHEN 975 MG: 325 TABLET, FILM COATED ORAL at 21:13

## 2021-11-28 RX ADMIN — GABAPENTIN 200 MG: 100 CAPSULE ORAL at 21:13

## 2021-11-28 RX ADMIN — OMEGA-3 FATTY ACIDS CAP 1000 MG 2000 MG: 1000 CAP at 17:07

## 2021-11-28 RX ADMIN — METOPROLOL SUCCINATE 200 MG: 100 TABLET, EXTENDED RELEASE ORAL at 11:28

## 2021-11-28 RX ADMIN — QUETIAPINE FUMARATE 300 MG: 100 TABLET ORAL at 21:13

## 2021-11-28 RX ADMIN — TORSEMIDE 20 MG: 20 TABLET ORAL at 21:13

## 2021-11-28 RX ADMIN — INSULIN LISPRO 1 UNITS: 100 INJECTION, SOLUTION INTRAVENOUS; SUBCUTANEOUS at 03:11

## 2021-11-28 RX ADMIN — GABAPENTIN 200 MG: 100 CAPSULE ORAL at 17:07

## 2021-11-28 RX ADMIN — OXYCODONE HYDROCHLORIDE 10 MG: 10 TABLET ORAL at 11:28

## 2021-11-28 RX ADMIN — METHOCARBAMOL 500 MG: 500 TABLET ORAL at 23:09

## 2021-11-28 RX ADMIN — SPIRONOLACTONE 25 MG: 25 TABLET ORAL at 11:29

## 2021-11-28 RX ADMIN — OXYCODONE HYDROCHLORIDE 15 MG: 10 TABLET ORAL at 17:14

## 2021-11-28 RX ADMIN — IPRATROPIUM BROMIDE AND ALBUTEROL SULFATE 3 ML: 2.5; .5 SOLUTION RESPIRATORY (INHALATION) at 07:18

## 2021-11-28 RX ADMIN — METHOCARBAMOL 500 MG: 500 TABLET ORAL at 00:30

## 2021-11-28 RX ADMIN — BUSPIRONE HYDROCHLORIDE 10 MG: 10 TABLET ORAL at 17:07

## 2021-11-28 RX ADMIN — TORSEMIDE 20 MG: 20 TABLET ORAL at 11:29

## 2021-11-28 RX ADMIN — IPRATROPIUM BROMIDE AND ALBUTEROL SULFATE 3 ML: 2.5; .5 SOLUTION RESPIRATORY (INHALATION) at 20:03

## 2021-11-28 RX ADMIN — GABAPENTIN 200 MG: 100 CAPSULE ORAL at 11:28

## 2021-11-28 RX ADMIN — ALPRAZOLAM 2 MG: 0.5 TABLET ORAL at 21:12

## 2021-11-28 RX ADMIN — Medication 1 TABLET: at 11:28

## 2021-11-28 RX ADMIN — INSULIN LISPRO 4 UNITS: 100 INJECTION, SOLUTION INTRAVENOUS; SUBCUTANEOUS at 21:14

## 2021-11-28 RX ADMIN — ACETAMINOPHEN 975 MG: 325 TABLET, FILM COATED ORAL at 06:02

## 2021-11-28 RX ADMIN — ACETAMINOPHEN 975 MG: 325 TABLET, FILM COATED ORAL at 13:29

## 2021-11-28 RX ADMIN — INSULIN GLARGINE 75 UNITS: 100 INJECTION, SOLUTION SUBCUTANEOUS at 11:29

## 2021-11-28 RX ADMIN — FLUTICASONE FUROATE AND VILANTEROL TRIFENATATE 1 PUFF: 100; 25 POWDER RESPIRATORY (INHALATION) at 11:27

## 2021-11-28 RX ADMIN — DICLOFENAC SODIUM TOPICAL GEL, 1%, 2 G: 10 GEL TOPICAL at 13:22

## 2021-11-28 RX ADMIN — OXYCODONE HYDROCHLORIDE 15 MG: 10 TABLET ORAL at 21:20

## 2021-11-28 RX ADMIN — METHOCARBAMOL 500 MG: 500 TABLET ORAL at 17:07

## 2021-11-28 RX ADMIN — SERTRALINE HYDROCHLORIDE 50 MG: 50 TABLET ORAL at 11:28

## 2021-11-28 RX ADMIN — INSULIN GLARGINE 75 UNITS: 100 INJECTION, SOLUTION SUBCUTANEOUS at 21:14

## 2021-11-28 RX ADMIN — DICLOFENAC SODIUM TOPICAL GEL, 1%, 2 G: 10 GEL TOPICAL at 21:15

## 2021-11-28 RX ADMIN — LOSARTAN POTASSIUM 50 MG: 50 TABLET, FILM COATED ORAL at 11:29

## 2021-11-28 RX ADMIN — INSULIN LISPRO 10 UNITS: 100 INJECTION, SOLUTION INTRAVENOUS; SUBCUTANEOUS at 17:09

## 2021-11-28 RX ADMIN — IPRATROPIUM BROMIDE AND ALBUTEROL SULFATE 3 ML: 2.5; .5 SOLUTION RESPIRATORY (INHALATION) at 02:35

## 2021-11-28 RX ADMIN — OMEGA-3 FATTY ACIDS CAP 1000 MG 2000 MG: 1000 CAP at 11:28

## 2021-11-28 RX ADMIN — APIXABAN 5 MG: 5 TABLET, FILM COATED ORAL at 17:07

## 2021-11-28 RX ADMIN — ATORVASTATIN CALCIUM 80 MG: 80 TABLET ORAL at 17:07

## 2021-11-28 RX ADMIN — METHOCARBAMOL 500 MG: 500 TABLET ORAL at 06:03

## 2021-11-28 RX ADMIN — IPRATROPIUM BROMIDE AND ALBUTEROL SULFATE 3 ML: 2.5; .5 SOLUTION RESPIRATORY (INHALATION) at 14:31

## 2021-11-28 RX ADMIN — BUSPIRONE HYDROCHLORIDE 10 MG: 10 TABLET ORAL at 11:28

## 2021-11-28 RX ADMIN — DIGOXIN 250 MCG: 125 TABLET ORAL at 11:29

## 2021-11-28 RX ADMIN — OXYCODONE HYDROCHLORIDE AND ACETAMINOPHEN 1000 MG: 500 TABLET ORAL at 11:28

## 2021-11-28 RX ADMIN — QUETIAPINE FUMARATE 200 MG: 200 TABLET, EXTENDED RELEASE ORAL at 11:31

## 2021-11-28 RX ADMIN — METHOCARBAMOL 500 MG: 500 TABLET ORAL at 11:43

## 2021-11-28 RX ADMIN — TAMSULOSIN HYDROCHLORIDE 0.4 MG: 0.4 CAPSULE ORAL at 17:07

## 2021-11-28 RX ADMIN — APIXABAN 5 MG: 5 TABLET, FILM COATED ORAL at 11:29

## 2021-11-28 RX ADMIN — Medication 1000 UNITS: at 11:28

## 2021-11-29 VITALS
HEART RATE: 72 BPM | OXYGEN SATURATION: 92 % | SYSTOLIC BLOOD PRESSURE: 119 MMHG | DIASTOLIC BLOOD PRESSURE: 62 MMHG | TEMPERATURE: 97.8 F | RESPIRATION RATE: 14 BRPM

## 2021-11-29 PROBLEM — J44.1 COPD EXACERBATION (HCC): Status: RESOLVED | Noted: 2020-02-02 | Resolved: 2021-11-29

## 2021-11-29 LAB
GLUCOSE SERPL-MCNC: 196 MG/DL (ref 65–140)
GLUCOSE SERPL-MCNC: 212 MG/DL (ref 65–140)
GLUCOSE SERPL-MCNC: 278 MG/DL (ref 65–140)

## 2021-11-29 PROCEDURE — 82948 REAGENT STRIP/BLOOD GLUCOSE: CPT

## 2021-11-29 PROCEDURE — 99239 HOSP IP/OBS DSCHRG MGMT >30: CPT | Performed by: STUDENT IN AN ORGANIZED HEALTH CARE EDUCATION/TRAINING PROGRAM

## 2021-11-29 PROCEDURE — 94760 N-INVAS EAR/PLS OXIMETRY 1: CPT

## 2021-11-29 PROCEDURE — 94640 AIRWAY INHALATION TREATMENT: CPT

## 2021-11-29 RX ORDER — TAMSULOSIN HYDROCHLORIDE 0.4 MG/1
0.4 CAPSULE ORAL
Qty: 7 CAPSULE | Refills: 0
Start: 2021-11-29 | End: 2022-03-24

## 2021-11-29 RX ORDER — GABAPENTIN 100 MG/1
200 CAPSULE ORAL 3 TIMES DAILY
Qty: 42 CAPSULE | Refills: 0
Start: 2021-11-29 | End: 2021-12-06 | Stop reason: DRUGHIGH

## 2021-11-29 RX ORDER — OXYCODONE HYDROCHLORIDE 10 MG/1
10 TABLET ORAL EVERY 4 HOURS PRN
Status: DISCONTINUED | OUTPATIENT
Start: 2021-11-29 | End: 2021-11-29 | Stop reason: HOSPADM

## 2021-11-29 RX ORDER — OXYCODONE HYDROCHLORIDE 10 MG/1
20 TABLET ORAL EVERY 4 HOURS PRN
Status: DISCONTINUED | OUTPATIENT
Start: 2021-11-29 | End: 2021-11-29 | Stop reason: HOSPADM

## 2021-11-29 RX ADMIN — ASPIRIN 81 MG CHEWABLE TABLET 81 MG: 81 TABLET CHEWABLE at 08:18

## 2021-11-29 RX ADMIN — OMEGA-3 FATTY ACIDS CAP 1000 MG 2000 MG: 1000 CAP at 08:19

## 2021-11-29 RX ADMIN — METOPROLOL SUCCINATE 200 MG: 100 TABLET, EXTENDED RELEASE ORAL at 08:19

## 2021-11-29 RX ADMIN — SERTRALINE HYDROCHLORIDE 50 MG: 50 TABLET ORAL at 08:18

## 2021-11-29 RX ADMIN — SPIRONOLACTONE 25 MG: 25 TABLET ORAL at 08:17

## 2021-11-29 RX ADMIN — TORSEMIDE 20 MG: 20 TABLET ORAL at 08:18

## 2021-11-29 RX ADMIN — OXYCODONE HYDROCHLORIDE 15 MG: 10 TABLET ORAL at 10:27

## 2021-11-29 RX ADMIN — GABAPENTIN 200 MG: 100 CAPSULE ORAL at 08:17

## 2021-11-29 RX ADMIN — IPRATROPIUM BROMIDE AND ALBUTEROL SULFATE 3 ML: 2.5; .5 SOLUTION RESPIRATORY (INHALATION) at 07:30

## 2021-11-29 RX ADMIN — OXYCODONE HYDROCHLORIDE 15 MG: 10 TABLET ORAL at 05:51

## 2021-11-29 RX ADMIN — OXYCODONE HYDROCHLORIDE 20 MG: 10 TABLET ORAL at 12:00

## 2021-11-29 RX ADMIN — FLUTICASONE FUROATE AND VILANTEROL TRIFENATATE 1 PUFF: 100; 25 POWDER RESPIRATORY (INHALATION) at 08:16

## 2021-11-29 RX ADMIN — Medication 1000 UNITS: at 08:19

## 2021-11-29 RX ADMIN — APIXABAN 5 MG: 5 TABLET, FILM COATED ORAL at 08:19

## 2021-11-29 RX ADMIN — METHOCARBAMOL 500 MG: 500 TABLET ORAL at 05:49

## 2021-11-29 RX ADMIN — INSULIN LISPRO 2 UNITS: 100 INJECTION, SOLUTION INTRAVENOUS; SUBCUTANEOUS at 02:20

## 2021-11-29 RX ADMIN — METHOCARBAMOL 500 MG: 500 TABLET ORAL at 11:58

## 2021-11-29 RX ADMIN — INSULIN GLARGINE 75 UNITS: 100 INJECTION, SOLUTION SUBCUTANEOUS at 08:23

## 2021-11-29 RX ADMIN — DICLOFENAC SODIUM TOPICAL GEL, 1%, 2 G: 10 GEL TOPICAL at 08:16

## 2021-11-29 RX ADMIN — DIGOXIN 250 MCG: 125 TABLET ORAL at 08:19

## 2021-11-29 RX ADMIN — BUSPIRONE HYDROCHLORIDE 10 MG: 10 TABLET ORAL at 08:19

## 2021-11-29 RX ADMIN — INSULIN LISPRO 4 UNITS: 100 INJECTION, SOLUTION INTRAVENOUS; SUBCUTANEOUS at 11:57

## 2021-11-29 RX ADMIN — LOSARTAN POTASSIUM 50 MG: 50 TABLET, FILM COATED ORAL at 08:18

## 2021-11-29 RX ADMIN — OXYCODONE HYDROCHLORIDE AND ACETAMINOPHEN 1000 MG: 500 TABLET ORAL at 08:18

## 2021-11-29 RX ADMIN — QUETIAPINE FUMARATE 200 MG: 200 TABLET, EXTENDED RELEASE ORAL at 08:17

## 2021-11-29 RX ADMIN — ACETAMINOPHEN 975 MG: 325 TABLET, FILM COATED ORAL at 05:49

## 2021-11-29 RX ADMIN — Medication 1 TABLET: at 08:17

## 2021-11-29 RX ADMIN — INSULIN LISPRO 6 UNITS: 100 INJECTION, SOLUTION INTRAVENOUS; SUBCUTANEOUS at 08:15

## 2021-11-30 ENCOUNTER — TRANSITIONAL CARE MANAGEMENT (OUTPATIENT)
Dept: FAMILY MEDICINE CLINIC | Facility: CLINIC | Age: 61
End: 2021-11-30

## 2021-12-02 ENCOUNTER — TELEPHONE (OUTPATIENT)
Dept: OBGYN CLINIC | Facility: HOSPITAL | Age: 61
End: 2021-12-02

## 2021-12-06 ENCOUNTER — TELEMEDICINE (OUTPATIENT)
Dept: FAMILY MEDICINE CLINIC | Facility: CLINIC | Age: 61
End: 2021-12-06
Payer: MEDICARE

## 2021-12-06 ENCOUNTER — TELEPHONE (OUTPATIENT)
Dept: OTHER | Facility: OTHER | Age: 61
End: 2021-12-06

## 2021-12-06 VITALS — HEIGHT: 70 IN | WEIGHT: 280 LBS | BODY MASS INDEX: 40.09 KG/M2

## 2021-12-06 DIAGNOSIS — J44.9 OSA AND COPD OVERLAP SYNDROME (HCC): ICD-10-CM

## 2021-12-06 DIAGNOSIS — M54.9 BACK PAIN WITH RADIATION: ICD-10-CM

## 2021-12-06 DIAGNOSIS — G47.33 OSA AND COPD OVERLAP SYNDROME (HCC): ICD-10-CM

## 2021-12-06 DIAGNOSIS — F31.9 BIPOLAR AFFECTIVE DISORDER, REMISSION STATUS UNSPECIFIED (HCC): ICD-10-CM

## 2021-12-06 DIAGNOSIS — Z79.4 TYPE 2 DIABETES MELLITUS WITH COMPLICATION, WITH LONG-TERM CURRENT USE OF INSULIN (HCC): ICD-10-CM

## 2021-12-06 DIAGNOSIS — G62.9 PERIPHERAL NEUROPATHY: ICD-10-CM

## 2021-12-06 DIAGNOSIS — R05.9 COUGH: Primary | ICD-10-CM

## 2021-12-06 DIAGNOSIS — I50.9 CHRONIC CONGESTIVE HEART FAILURE, UNSPECIFIED HEART FAILURE TYPE (HCC): ICD-10-CM

## 2021-12-06 DIAGNOSIS — E11.8 TYPE 2 DIABETES MELLITUS WITH COMPLICATION, WITH LONG-TERM CURRENT USE OF INSULIN (HCC): ICD-10-CM

## 2021-12-06 DIAGNOSIS — R30.0 DYSURIA: ICD-10-CM

## 2021-12-06 DIAGNOSIS — I48.0 PAF (PAROXYSMAL ATRIAL FIBRILLATION) (HCC): ICD-10-CM

## 2021-12-06 PROCEDURE — 99442 PR PHYS/QHP TELEPHONE EVALUATION 11-20 MIN: CPT | Performed by: FAMILY MEDICINE

## 2021-12-06 RX ORDER — METHOCARBAMOL 500 MG/1
TABLET, FILM COATED ORAL
COMMUNITY
Start: 2021-11-30 | End: 2022-01-07 | Stop reason: ALTCHOICE

## 2021-12-06 RX ORDER — PEN NEEDLE, DIABETIC 33 GX5/32"
NEEDLE, DISPOSABLE MISCELLANEOUS
COMMUNITY
Start: 2021-12-01

## 2021-12-06 RX ORDER — LEVOFLOXACIN 500 MG/1
500 TABLET, FILM COATED ORAL EVERY 24 HOURS
Qty: 7 TABLET | Refills: 0 | Status: SHIPPED | OUTPATIENT
Start: 2021-12-06 | End: 2021-12-13

## 2021-12-06 RX ORDER — METHOCARBAMOL 500 MG/1
TABLET, FILM COATED ORAL
Status: CANCELLED | OUTPATIENT
Start: 2021-12-06

## 2021-12-06 RX ORDER — GABAPENTIN 300 MG/1
300 CAPSULE ORAL 2 TIMES DAILY
Qty: 60 CAPSULE | Refills: 1 | Status: SHIPPED | OUTPATIENT
Start: 2021-12-06 | End: 2021-12-28

## 2021-12-07 DIAGNOSIS — I50.22 CHRONIC SYSTOLIC HEART FAILURE (HCC): ICD-10-CM

## 2021-12-07 PROBLEM — R30.0 DYSURIA: Status: ACTIVE | Noted: 2021-12-07

## 2021-12-08 ENCOUNTER — TELEPHONE (OUTPATIENT)
Dept: FAMILY MEDICINE CLINIC | Facility: CLINIC | Age: 61
End: 2021-12-08

## 2021-12-08 RX ORDER — SPIRONOLACTONE 25 MG/1
TABLET ORAL
Qty: 30 TABLET | Refills: 2 | Status: SHIPPED | OUTPATIENT
Start: 2021-12-08 | End: 2022-03-10

## 2021-12-09 ENCOUNTER — HOSPITAL ENCOUNTER (EMERGENCY)
Facility: HOSPITAL | Age: 61
Discharge: HOME | End: 2021-12-09
Attending: EMERGENCY MEDICINE | Admitting: EMERGENCY MEDICINE
Payer: MEDICARE

## 2021-12-09 VITALS
BODY MASS INDEX: 39.2 KG/M2 | WEIGHT: 280 LBS | TEMPERATURE: 98.8 F | DIASTOLIC BLOOD PRESSURE: 77 MMHG | RESPIRATION RATE: 20 BRPM | HEART RATE: 93 BPM | HEIGHT: 71 IN | SYSTOLIC BLOOD PRESSURE: 167 MMHG | OXYGEN SATURATION: 96 %

## 2021-12-09 DIAGNOSIS — M54.50 LOW BACK PAIN: Primary | ICD-10-CM

## 2021-12-09 LAB
FLUAV RNA RESP QL NAA+PROBE: NEGATIVE
FLUBV RNA RESP QL NAA+PROBE: NEGATIVE
RSV RNA RESP QL NAA+PROBE: NEGATIVE
SARS-COV-2 RNA RESP QL NAA+PROBE: NEGATIVE

## 2021-12-09 PROCEDURE — 99284 EMERGENCY DEPT VISIT MOD MDM: CPT | Performed by: PHYSICIAN ASSISTANT

## 2021-12-09 PROCEDURE — 97167 OT EVAL HIGH COMPLEX 60 MIN: CPT

## 2021-12-09 PROCEDURE — 97163 PT EVAL HIGH COMPLEX 45 MIN: CPT

## 2021-12-09 PROCEDURE — 99284 EMERGENCY DEPT VISIT MOD MDM: CPT

## 2021-12-09 PROCEDURE — 0241U HB NFCT DS VIR RESP RNA 4 TRGT: CPT | Performed by: PHYSICIAN ASSISTANT

## 2021-12-09 RX ORDER — OXYCODONE HYDROCHLORIDE AND ACETAMINOPHEN 5; 325 MG/1; MG/1
1 TABLET ORAL ONCE
Status: COMPLETED | OUTPATIENT
Start: 2021-12-09 | End: 2021-12-09

## 2021-12-09 RX ORDER — ACETAMINOPHEN 325 MG/1
650 TABLET ORAL ONCE
Status: COMPLETED | OUTPATIENT
Start: 2021-12-09 | End: 2021-12-09

## 2021-12-09 RX ORDER — LIDOCAINE 50 MG/G
1 PATCH TOPICAL ONCE
Status: DISCONTINUED | OUTPATIENT
Start: 2021-12-09 | End: 2021-12-09 | Stop reason: HOSPADM

## 2021-12-09 RX ADMIN — OXYCODONE HYDROCHLORIDE AND ACETAMINOPHEN 1 TABLET: 5; 325 TABLET ORAL at 12:54

## 2021-12-09 RX ADMIN — LIDOCAINE 5% 1 PATCH: 700 PATCH TOPICAL at 17:11

## 2021-12-09 RX ADMIN — OXYCODONE HYDROCHLORIDE AND ACETAMINOPHEN 1 TABLET: 5; 325 TABLET ORAL at 18:50

## 2021-12-09 RX ADMIN — ACETAMINOPHEN 650 MG: 325 TABLET, FILM COATED ORAL at 12:09

## 2021-12-13 ENCOUNTER — TELEPHONE (OUTPATIENT)
Dept: FAMILY MEDICINE CLINIC | Facility: CLINIC | Age: 61
End: 2021-12-13

## 2021-12-28 DIAGNOSIS — G62.9 PERIPHERAL NEUROPATHY: ICD-10-CM

## 2021-12-28 RX ORDER — GABAPENTIN 300 MG/1
300 CAPSULE ORAL 2 TIMES DAILY
Qty: 60 CAPSULE | Refills: 1 | Status: SHIPPED | OUTPATIENT
Start: 2021-12-28 | End: 2022-02-14

## 2022-01-06 ENCOUNTER — TELEPHONE (OUTPATIENT)
Dept: PULMONOLOGY | Facility: MEDICAL CENTER | Age: 62
End: 2022-01-06

## 2022-01-06 NOTE — TELEPHONE ENCOUNTER
Lm for patient to call office back to schedule 5m f/u for Feb with Dr Jose Levi  Appointment reminder mailed

## 2022-01-07 ENCOUNTER — TELEPHONE (OUTPATIENT)
Dept: FAMILY MEDICINE CLINIC | Facility: CLINIC | Age: 62
End: 2022-01-07

## 2022-01-07 NOTE — TELEPHONE ENCOUNTER
Dr Justo Lazar    Patient wants to make sure you saw his my chart message with fax# for [phone] [phone]  Also patient is requesting you send rx for flexeril 20 mg 3 x daily to [address] [place] [address] for his back pain  Patient says this was previously prescribed by orthopedist couple years ago

## 2022-01-07 NOTE — TELEPHONE ENCOUNTER
Patient says his appointment with Dr Juares Lipoma is not until 2/21  Can you send in additional pills to last him until then?

## 2022-01-07 NOTE — TELEPHONE ENCOUNTER
Please let pt know I saw message and will send over later today--if he is still having prob w back he needs to follow-up w ortho--I will send in limited supply of flexeril to hold him until he can speak w specialist--thanks

## 2022-01-10 NOTE — TELEPHONE ENCOUNTER
Phone line rang and disconnected       [facility], please advise patient should and when he calls back, thank you

## 2022-01-10 NOTE — TELEPHONE ENCOUNTER
Faxed clearance to [facility] 5/28 @ 810 am   Sylvain Olivo
Pre-op clearance needs to be completed: form in the letter tab ready for you   Scheduled for 6/9
96.9

## 2022-01-11 ENCOUNTER — OFFICE VISIT (OUTPATIENT)
Dept: FAMILY MEDICINE CLINIC | Facility: CLINIC | Age: 62
End: 2022-01-11
Payer: MEDICARE

## 2022-01-11 ENCOUNTER — TELEPHONE (OUTPATIENT)
Dept: FAMILY MEDICINE CLINIC | Facility: CLINIC | Age: 62
End: 2022-01-11

## 2022-01-11 VITALS
HEIGHT: 71 IN | BODY MASS INDEX: 42 KG/M2 | DIASTOLIC BLOOD PRESSURE: 80 MMHG | WEIGHT: 300 LBS | SYSTOLIC BLOOD PRESSURE: 130 MMHG

## 2022-01-11 DIAGNOSIS — I48.20 CHRONIC A-FIB (HCC): ICD-10-CM

## 2022-01-11 DIAGNOSIS — E66.01 MORBID OBESITY (HCC): ICD-10-CM

## 2022-01-11 DIAGNOSIS — E11.40 CONTROLLED TYPE 2 DIABETES MELLITUS WITH DIABETIC NEUROPATHY, WITH LONG-TERM CURRENT USE OF INSULIN (HCC): ICD-10-CM

## 2022-01-11 DIAGNOSIS — Z79.4 CONTROLLED TYPE 2 DIABETES MELLITUS WITH DIABETIC NEUROPATHY, WITH LONG-TERM CURRENT USE OF INSULIN (HCC): ICD-10-CM

## 2022-01-11 DIAGNOSIS — M54.9 BACK PAIN WITH RADIATION: Primary | ICD-10-CM

## 2022-01-11 DIAGNOSIS — J96.11 CHRONIC RESPIRATORY FAILURE WITH HYPOXIA (HCC): ICD-10-CM

## 2022-01-11 PROCEDURE — 99213 OFFICE O/P EST LOW 20 MIN: CPT | Performed by: FAMILY MEDICINE

## 2022-01-11 RX ORDER — QUETIAPINE FUMARATE 50 MG/1
100 TABLET, EXTENDED RELEASE ORAL EVERY MORNING
COMMUNITY
Start: 2022-01-04

## 2022-01-11 RX ORDER — LIDOCAINE 40 MG/G
CREAM TOPICAL AS NEEDED
Qty: 45 G | Refills: 0 | Status: SHIPPED | OUTPATIENT
Start: 2022-01-11 | End: 2022-03-24

## 2022-01-11 NOTE — TELEPHONE ENCOUNTER
I spoke to Dr Homa Johnson office , ibuprofen asked them if they could move patietn appt sooner   She said she would check the cancellation list to see if he can be added    She said pt is aware to call daily to check this list  Tc/cma

## 2022-01-12 DIAGNOSIS — E11.65 TYPE 2 DIABETES MELLITUS WITH HYPERGLYCEMIA, WITH LONG-TERM CURRENT USE OF INSULIN (HCC): ICD-10-CM

## 2022-01-12 DIAGNOSIS — I50.22 CHRONIC SYSTOLIC HEART FAILURE (HCC): ICD-10-CM

## 2022-01-12 DIAGNOSIS — Z79.4 TYPE 2 DIABETES MELLITUS WITH HYPERGLYCEMIA, WITH LONG-TERM CURRENT USE OF INSULIN (HCC): ICD-10-CM

## 2022-01-12 RX ORDER — LOSARTAN POTASSIUM 50 MG/1
TABLET ORAL
Qty: 90 TABLET | Refills: 3 | Status: SHIPPED | OUTPATIENT
Start: 2022-01-12

## 2022-01-13 RX ORDER — ISOPROPYL ALCOHOL 0.75 G/1
SWAB TOPICAL
Qty: 500 EACH | Refills: 1 | Status: SHIPPED | OUTPATIENT
Start: 2022-01-13

## 2022-01-13 RX ORDER — METFORMIN HYDROCHLORIDE 500 MG/1
TABLET, EXTENDED RELEASE ORAL
Qty: 90 TABLET | Refills: 3 | Status: SHIPPED | OUTPATIENT
Start: 2022-01-13

## 2022-01-18 DIAGNOSIS — Z79.4 TYPE 2 DIABETES MELLITUS WITH COMPLICATION, WITH LONG-TERM CURRENT USE OF INSULIN (HCC): ICD-10-CM

## 2022-01-18 DIAGNOSIS — E11.65 UNCONTROLLED TYPE 2 DIABETES MELLITUS WITH HYPERGLYCEMIA (HCC): Chronic | ICD-10-CM

## 2022-01-18 DIAGNOSIS — E11.8 TYPE 2 DIABETES MELLITUS WITH COMPLICATION, WITH LONG-TERM CURRENT USE OF INSULIN (HCC): ICD-10-CM

## 2022-01-18 RX ORDER — INSULIN ASPART 100 [IU]/ML
INJECTION, SOLUTION INTRAVENOUS; SUBCUTANEOUS
Qty: 30 ML | Refills: 3 | Status: SHIPPED | OUTPATIENT
Start: 2022-01-18 | End: 2022-04-01

## 2022-01-18 RX ORDER — PEN NEEDLE, DIABETIC 33 GX5/32"
NEEDLE, DISPOSABLE MISCELLANEOUS
Qty: 500 EACH | Refills: 1 | Status: SHIPPED | OUTPATIENT
Start: 2022-01-18

## 2022-01-21 DIAGNOSIS — E87.8 ELECTROLYTE ABNORMALITY: Primary | ICD-10-CM

## 2022-01-21 RX ORDER — POTASSIUM CHLORIDE 20 MEQ/1
TABLET, EXTENDED RELEASE ORAL
Qty: 90 TABLET | Refills: 0 | Status: SHIPPED | OUTPATIENT
Start: 2022-01-21 | End: 2022-03-31
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
## 2022-01-24 ENCOUNTER — TELEPHONE (OUTPATIENT)
Dept: PULMONOLOGY | Facility: CLINIC | Age: 62
End: 2022-01-24

## 2022-01-24 NOTE — TELEPHONE ENCOUNTER
Spoke with pt, he stated he currently has symptoms of congestion, couch w/mucus, wheezing, SOB  No chest pain  States symptoms started Friday, feels it may be bronchitis  Pt states he is taking his nebulizer, Trelegy, and albuterol, but none are helping much

## 2022-01-24 NOTE — TELEPHONE ENCOUNTER
I contacted patient's telephone twice around 5:00 a m  Monday 1/24  Left message for patient to call us Both times answer machine was on    Both times he did not  the phone call and went to message

## 2022-01-25 ENCOUNTER — TELEPHONE (OUTPATIENT)
Dept: PULMONOLOGY | Facility: MEDICAL CENTER | Age: 62
End: 2022-01-25

## 2022-01-25 DIAGNOSIS — R05.9 COUGH: ICD-10-CM

## 2022-01-25 DIAGNOSIS — J44.1 COPD EXACERBATION (HCC): Primary | ICD-10-CM

## 2022-01-25 RX ORDER — CEFUROXIME AXETIL 500 MG/1
500 TABLET ORAL EVERY 12 HOURS SCHEDULED
Qty: 14 TABLET | Refills: 0 | Status: SHIPPED | OUTPATIENT
Start: 2022-01-25 | End: 2022-02-01

## 2022-01-25 RX ORDER — GUAIFENESIN AND CODEINE PHOSPHATE 100; 10 MG/5ML; MG/5ML
5 SOLUTION ORAL 3 TIMES DAILY PRN
Qty: 120 ML | Refills: 0 | Status: ON HOLD | OUTPATIENT
Start: 2022-01-25 | End: 2022-04-21 | Stop reason: SDUPTHER

## 2022-01-25 RX ORDER — PREDNISONE 10 MG/1
TABLET ORAL
Qty: 30 TABLET | Refills: 0 | Status: SHIPPED | OUTPATIENT
Start: 2022-01-25 | End: 2022-02-07 | Stop reason: CLARIF

## 2022-01-25 NOTE — TELEPHONE ENCOUNTER
Alonzo called stating that he has been having some cough for yellow mucus  Also having some wheezing  States he did get a booster COVID dose yesterday and has not been around anybody with COVID  He does not have aches and pains  No fevers    He requested antibiotic cough medication and feels he needs prednisone for his exacerbation of chronic bronchitis    I ordered prednisone started 40 mg daily with 10 mg taper every 4th day, Ceftin 500 mg b i d  for bronchitis and Robitussin codeine    I told the cause of there was no improvement

## 2022-01-26 ENCOUNTER — TELEPHONE (OUTPATIENT)
Dept: FAMILY MEDICINE CLINIC | Facility: CLINIC | Age: 62
End: 2022-01-26

## 2022-01-26 ENCOUNTER — TELEPHONE (OUTPATIENT)
Dept: CARDIOLOGY CLINIC | Facility: CLINIC | Age: 62
End: 2022-01-26

## 2022-01-26 NOTE — TELEPHONE ENCOUNTER
Dr Angelica Pardo    Patient wants you to know he had virtual appointment with [facility] yesterday and her prescribed prednisone , antibiotic and cough med with codeine for his bronchitis  He has also been using his nebulizer

## 2022-01-26 NOTE — TELEPHONE ENCOUNTER
Patient has an appt end of Feb saw you in Sept 2021 can he do virtual or does he need to come in please advise

## 2022-02-01 DIAGNOSIS — M54.9 BACK PAIN WITH RADIATION: ICD-10-CM

## 2022-02-01 RX ORDER — CYCLOBENZAPRINE HCL 10 MG
10 TABLET ORAL 3 TIMES DAILY PRN
Qty: 30 TABLET | Refills: 0 | Status: ON HOLD | OUTPATIENT
Start: 2022-02-01 | End: 2022-03-24

## 2022-02-06 ENCOUNTER — APPOINTMENT (EMERGENCY)
Dept: RADIOLOGY | Facility: HOSPITAL | Age: 62
DRG: 291 | End: 2022-02-06
Payer: COMMERCIAL

## 2022-02-06 ENCOUNTER — HOSPITAL ENCOUNTER (INPATIENT)
Facility: HOSPITAL | Age: 62
LOS: 1 days | Discharge: HOME HEALTH | DRG: 291 | End: 2022-02-08
Attending: EMERGENCY MEDICINE | Admitting: STUDENT IN AN ORGANIZED HEALTH CARE EDUCATION/TRAINING PROGRAM
Payer: COMMERCIAL

## 2022-02-06 DIAGNOSIS — I50.43 ACUTE ON CHRONIC COMBINED SYSTOLIC AND DIASTOLIC HEART FAILURE (HCC): Primary | ICD-10-CM

## 2022-02-06 DIAGNOSIS — I48.91 ATRIAL FIBRILLATION WITH RVR (HCC): ICD-10-CM

## 2022-02-06 DIAGNOSIS — R06.02 SOB (SHORTNESS OF BREATH): ICD-10-CM

## 2022-02-06 DIAGNOSIS — J44.1 COPD EXACERBATION (HCC): ICD-10-CM

## 2022-02-06 LAB
ALBUMIN SERPL BCP-MCNC: 3.7 G/DL (ref 3.5–5)
ALP SERPL-CCNC: 103 U/L (ref 46–116)
ALT SERPL W P-5'-P-CCNC: 50 U/L (ref 12–78)
ANION GAP SERPL CALCULATED.3IONS-SCNC: 10 MMOL/L (ref 4–13)
ANION GAP SERPL CALCULATED.3IONS-SCNC: 12 MMOL/L (ref 4–13)
APTT PPP: 30 SECONDS (ref 23–37)
AST SERPL W P-5'-P-CCNC: 55 U/L (ref 5–45)
BASOPHILS # BLD MANUAL: 0 THOUSAND/UL (ref 0–0.1)
BASOPHILS NFR MAR MANUAL: 0 % (ref 0–1)
BILIRUB SERPL-MCNC: 0.55 MG/DL (ref 0.2–1)
BILIRUB UR QL STRIP: NEGATIVE
BUN SERPL-MCNC: 32 MG/DL (ref 5–25)
BUN SERPL-MCNC: 33 MG/DL (ref 5–25)
CALCIUM SERPL-MCNC: 10.1 MG/DL (ref 8.3–10.1)
CALCIUM SERPL-MCNC: 9.9 MG/DL (ref 8.3–10.1)
CARDIAC TROPONIN I PNL SERPL HS: 15 NG/L
CHLORIDE SERPL-SCNC: 89 MMOL/L (ref 100–108)
CHLORIDE SERPL-SCNC: 91 MMOL/L (ref 100–108)
CLARITY UR: CLEAR
CO2 SERPL-SCNC: 26 MMOL/L (ref 21–32)
CO2 SERPL-SCNC: 26 MMOL/L (ref 21–32)
COLOR UR: ABNORMAL
CREAT SERPL-MCNC: 0.91 MG/DL (ref 0.6–1.3)
CREAT SERPL-MCNC: 1.02 MG/DL (ref 0.6–1.3)
EOSINOPHIL # BLD MANUAL: 0 THOUSAND/UL (ref 0–0.4)
EOSINOPHIL NFR BLD MANUAL: 0 % (ref 0–6)
ERYTHROCYTE [DISTWIDTH] IN BLOOD BY AUTOMATED COUNT: 12.8 % (ref 11.6–15.1)
FLUAV RNA RESP QL NAA+PROBE: NEGATIVE
FLUBV RNA RESP QL NAA+PROBE: NEGATIVE
GFR SERPL CREATININE-BSD FRML MDRD: 78 ML/MIN/1.73SQ M
GFR SERPL CREATININE-BSD FRML MDRD: 90 ML/MIN/1.73SQ M
GLUCOSE SERPL-MCNC: 228 MG/DL (ref 65–140)
GLUCOSE SERPL-MCNC: 270 MG/DL (ref 65–140)
GLUCOSE UR STRIP-MCNC: ABNORMAL MG/DL
HCT VFR BLD AUTO: 36.1 % (ref 36.5–49.3)
HGB BLD-MCNC: 12.5 G/DL (ref 12–17)
HGB UR QL STRIP.AUTO: NEGATIVE
INR PPP: 1.08 (ref 0.84–1.19)
KETONES UR STRIP-MCNC: NEGATIVE MG/DL
LEUKOCYTE ESTERASE UR QL STRIP: NEGATIVE
LYMPHOCYTES # BLD AUTO: 42 % (ref 14–44)
LYMPHOCYTES # BLD AUTO: 6.59 THOUSAND/UL (ref 0.6–4.47)
MCH RBC QN AUTO: 32 PG (ref 26.8–34.3)
MCHC RBC AUTO-ENTMCNC: 34.6 G/DL (ref 31.4–37.4)
MCV RBC AUTO: 92 FL (ref 82–98)
MONOCYTES # BLD AUTO: 1.88 THOUSAND/UL (ref 0–1.22)
MONOCYTES NFR BLD: 12 % (ref 4–12)
NEUTROPHILS # BLD MANUAL: 6.59 THOUSAND/UL (ref 1.85–7.62)
NEUTS SEG NFR BLD AUTO: 42 % (ref 43–75)
NITRITE UR QL STRIP: NEGATIVE
NT-PROBNP SERPL-MCNC: 446 PG/ML
PH UR STRIP.AUTO: 5.5 [PH]
PLATELET # BLD AUTO: 148 THOUSANDS/UL (ref 149–390)
PLATELET BLD QL SMEAR: ABNORMAL
PMV BLD AUTO: 8.8 FL (ref 8.9–12.7)
POTASSIUM SERPL-SCNC: 4.9 MMOL/L (ref 3.5–5.3)
POTASSIUM SERPL-SCNC: 6.3 MMOL/L (ref 3.5–5.3)
PROT SERPL-MCNC: 6.9 G/DL (ref 6.4–8.2)
PROT UR STRIP-MCNC: NEGATIVE MG/DL
PROTHROMBIN TIME: 13.8 SECONDS (ref 11.6–14.5)
RBC # BLD AUTO: 3.91 MILLION/UL (ref 3.88–5.62)
RBC MORPH BLD: NORMAL
RSV RNA RESP QL NAA+PROBE: NEGATIVE
SARS-COV-2 RNA RESP QL NAA+PROBE: NEGATIVE
SODIUM SERPL-SCNC: 125 MMOL/L (ref 136–145)
SODIUM SERPL-SCNC: 129 MMOL/L (ref 136–145)
SP GR UR STRIP.AUTO: 1.01 (ref 1–1.03)
TOXIC GRANULES BLD QL SMEAR: PRESENT
UROBILINOGEN UR QL STRIP.AUTO: 0.2 E.U./DL
VARIANT LYMPHS # BLD AUTO: 4 %
WBC # BLD AUTO: 15.7 THOUSAND/UL (ref 4.31–10.16)
WBC TOXIC VACUOLES BLD QL SMEAR: PRESENT

## 2022-02-06 PROCEDURE — 85007 BL SMEAR W/DIFF WBC COUNT: CPT | Performed by: EMERGENCY MEDICINE

## 2022-02-06 PROCEDURE — 36415 COLL VENOUS BLD VENIPUNCTURE: CPT | Performed by: EMERGENCY MEDICINE

## 2022-02-06 PROCEDURE — 99285 EMERGENCY DEPT VISIT HI MDM: CPT

## 2022-02-06 PROCEDURE — 83880 ASSAY OF NATRIURETIC PEPTIDE: CPT | Performed by: EMERGENCY MEDICINE

## 2022-02-06 PROCEDURE — 0241U HB NFCT DS VIR RESP RNA 4 TRGT: CPT | Performed by: EMERGENCY MEDICINE

## 2022-02-06 PROCEDURE — 85027 COMPLETE CBC AUTOMATED: CPT | Performed by: EMERGENCY MEDICINE

## 2022-02-06 PROCEDURE — 93005 ELECTROCARDIOGRAM TRACING: CPT

## 2022-02-06 PROCEDURE — 71045 X-RAY EXAM CHEST 1 VIEW: CPT

## 2022-02-06 PROCEDURE — 99285 EMERGENCY DEPT VISIT HI MDM: CPT | Performed by: EMERGENCY MEDICINE

## 2022-02-06 PROCEDURE — 80053 COMPREHEN METABOLIC PANEL: CPT | Performed by: EMERGENCY MEDICINE

## 2022-02-06 PROCEDURE — 85730 THROMBOPLASTIN TIME PARTIAL: CPT | Performed by: EMERGENCY MEDICINE

## 2022-02-06 PROCEDURE — 81003 URINALYSIS AUTO W/O SCOPE: CPT | Performed by: EMERGENCY MEDICINE

## 2022-02-06 PROCEDURE — 96374 THER/PROPH/DIAG INJ IV PUSH: CPT

## 2022-02-06 PROCEDURE — 84484 ASSAY OF TROPONIN QUANT: CPT | Performed by: EMERGENCY MEDICINE

## 2022-02-06 PROCEDURE — 80048 BASIC METABOLIC PNL TOTAL CA: CPT | Performed by: EMERGENCY MEDICINE

## 2022-02-06 PROCEDURE — 85610 PROTHROMBIN TIME: CPT | Performed by: EMERGENCY MEDICINE

## 2022-02-06 RX ORDER — METHYLPREDNISOLONE SODIUM SUCCINATE 125 MG/2ML
125 INJECTION, POWDER, LYOPHILIZED, FOR SOLUTION INTRAMUSCULAR; INTRAVENOUS ONCE
Status: COMPLETED | OUTPATIENT
Start: 2022-02-06 | End: 2022-02-07

## 2022-02-06 RX ORDER — FUROSEMIDE 10 MG/ML
20 INJECTION INTRAMUSCULAR; INTRAVENOUS ONCE
Status: COMPLETED | OUTPATIENT
Start: 2022-02-06 | End: 2022-02-06

## 2022-02-06 RX ORDER — METOPROLOL TARTRATE 5 MG/5ML
5 INJECTION INTRAVENOUS ONCE
Status: COMPLETED | OUTPATIENT
Start: 2022-02-07 | End: 2022-02-07

## 2022-02-06 RX ORDER — IPRATROPIUM BROMIDE AND ALBUTEROL SULFATE 2.5; .5 MG/3ML; MG/3ML
3 SOLUTION RESPIRATORY (INHALATION)
Status: DISCONTINUED | OUTPATIENT
Start: 2022-02-07 | End: 2022-02-07

## 2022-02-06 RX ORDER — METOPROLOL TARTRATE 5 MG/5ML
5 INJECTION INTRAVENOUS ONCE
Status: COMPLETED | OUTPATIENT
Start: 2022-02-06 | End: 2022-02-06

## 2022-02-06 RX ADMIN — METOPROLOL TARTRATE 5 MG: 5 INJECTION INTRAVENOUS at 22:24

## 2022-02-06 RX ADMIN — FUROSEMIDE 20 MG: 10 INJECTION, SOLUTION INTRAVENOUS at 23:59

## 2022-02-06 RX ADMIN — METHYLPREDNISOLONE SODIUM SUCCINATE 125 MG: 125 INJECTION, POWDER, FOR SOLUTION INTRAMUSCULAR; INTRAVENOUS at 23:57

## 2022-02-07 ENCOUNTER — APPOINTMENT (OUTPATIENT)
Dept: NON INVASIVE DIAGNOSTICS | Facility: HOSPITAL | Age: 62
DRG: 291 | End: 2022-02-07
Payer: COMMERCIAL

## 2022-02-07 PROBLEM — R65.10 SIRS (SYSTEMIC INFLAMMATORY RESPONSE SYNDROME) (HCC): Status: ACTIVE | Noted: 2022-02-07

## 2022-02-07 PROBLEM — R06.02 SHORTNESS OF BREATH: Status: ACTIVE | Noted: 2022-02-07

## 2022-02-07 LAB
2HR DELTA HS TROPONIN: 2 NG/L
4HR DELTA HS TROPONIN: 3 NG/L
ALBUMIN SERPL BCP-MCNC: 4.4 G/DL (ref 3.5–5)
ALP SERPL-CCNC: 77 U/L (ref 46–116)
ALT SERPL W P-5'-P-CCNC: 57 U/L (ref 12–78)
ANION GAP SERPL CALCULATED.3IONS-SCNC: 14 MMOL/L (ref 4–13)
AORTIC ROOT: 3.4 CM
AST SERPL W P-5'-P-CCNC: 27 U/L (ref 5–45)
BASOPHILS # BLD AUTO: 0.05 THOUSANDS/ΜL (ref 0–0.1)
BASOPHILS NFR BLD AUTO: 0 % (ref 0–1)
BILIRUB SERPL-MCNC: 0.61 MG/DL (ref 0.2–1)
BUN SERPL-MCNC: 46 MG/DL (ref 5–25)
CALCIUM SERPL-MCNC: 10.5 MG/DL (ref 8.3–10.1)
CARDIAC TROPONIN I PNL SERPL HS: 17 NG/L
CARDIAC TROPONIN I PNL SERPL HS: 18 NG/L
CHLORIDE SERPL-SCNC: 90 MMOL/L (ref 100–108)
CO2 SERPL-SCNC: 24 MMOL/L (ref 21–32)
CREAT SERPL-MCNC: 1.51 MG/DL (ref 0.6–1.3)
E WAVE DECELERATION TIME: 208 MS
EOSINOPHIL # BLD AUTO: 0.02 THOUSAND/ΜL (ref 0–0.61)
EOSINOPHIL NFR BLD AUTO: 0 % (ref 0–6)
ERYTHROCYTE [DISTWIDTH] IN BLOOD BY AUTOMATED COUNT: 12.8 % (ref 11.6–15.1)
FRACTIONAL SHORTENING: 23 % (ref 28–44)
GFR SERPL CREATININE-BSD FRML MDRD: 48 ML/MIN/1.73SQ M
GLUCOSE SERPL-MCNC: 198 MG/DL (ref 65–140)
GLUCOSE SERPL-MCNC: 253 MG/DL (ref 65–140)
GLUCOSE SERPL-MCNC: 265 MG/DL (ref 65–140)
GLUCOSE SERPL-MCNC: 293 MG/DL (ref 65–140)
GLUCOSE SERPL-MCNC: 369 MG/DL (ref 65–140)
GLUCOSE SERPL-MCNC: 378 MG/DL (ref 65–140)
HCT VFR BLD AUTO: 40.8 % (ref 36.5–49.3)
HGB BLD-MCNC: 14.1 G/DL (ref 12–17)
IMM GRANULOCYTES # BLD AUTO: 0.21 THOUSAND/UL (ref 0–0.2)
IMM GRANULOCYTES NFR BLD AUTO: 1 % (ref 0–2)
INTERVENTRICULAR SEPTUM IN DIASTOLE (PARASTERNAL SHORT AXIS VIEW): 1.2 CM (ref 0.6–1.12)
LEFT ATRIUM AREA SYSTOLE SINGLE PLANE A4C: 33.3 CM2
LEFT ATRIUM SIZE: 5.6 CM
LEFT INTERNAL DIMENSION IN SYSTOLE: 4 CM (ref 2.1–4)
LEFT VENTRICULAR INTERNAL DIMENSION IN DIASTOLE: 5.2 CM (ref 19.55–29.16)
LEFT VENTRICULAR POSTERIOR WALL IN END DIASTOLE: 1.2 CM (ref 0.58–1.11)
LEFT VENTRICULAR STROKE VOLUME: 61 ML
LYMPHOCYTES # BLD AUTO: 10.22 THOUSANDS/ΜL (ref 0.6–4.47)
LYMPHOCYTES NFR BLD AUTO: 47 % (ref 14–44)
MCH RBC QN AUTO: 32.2 PG (ref 26.8–34.3)
MCHC RBC AUTO-ENTMCNC: 34.6 G/DL (ref 31.4–37.4)
MCV RBC AUTO: 93 FL (ref 82–98)
MONOCYTES # BLD AUTO: 0.17 THOUSAND/ΜL (ref 0.17–1.22)
MONOCYTES NFR BLD AUTO: 1 % (ref 4–12)
MV E'TISSUE VEL-LAT: 9 CM/S
MV E'TISSUE VEL-SEP: 6 CM/S
MV PEAK A VEL: 0 M/S
MV PEAK E VEL: 100 CM/S
MV STENOSIS PRESSURE HALF TIME: 0 MS
NEUTROPHILS # BLD AUTO: 11.09 THOUSANDS/ΜL (ref 1.85–7.62)
NEUTS SEG NFR BLD AUTO: 51 % (ref 43–75)
NRBC BLD AUTO-RTO: 0 /100 WBCS
PLATELET # BLD AUTO: 188 THOUSANDS/UL (ref 149–390)
PMV BLD AUTO: 9 FL (ref 8.9–12.7)
POTASSIUM SERPL-SCNC: 4.9 MMOL/L (ref 3.5–5.3)
PROCALCITONIN SERPL-MCNC: 0.1 NG/ML
PROT SERPL-MCNC: 8.1 G/DL (ref 6.4–8.2)
QRS AXIS: 77 DEGREES
QRSD INTERVAL: 90 MS
QT INTERVAL: 300 MS
QTC INTERVAL: 406 MS
RBC # BLD AUTO: 4.38 MILLION/UL (ref 3.88–5.62)
RIGHT ATRIUM AREA SYSTOLE A4C: 24.4 CM2
RIGHT VENTRICLE ID DIMENSION: 3.5 CM
SL CV LV EF: 55
SL CV PED ECHO LEFT VENTRICLE DIASTOLIC VOLUME (MOD BIPLANE) 2D: 132 ML
SL CV PED ECHO LEFT VENTRICLE SYSTOLIC VOLUME (MOD BIPLANE) 2D: 71 ML
SODIUM SERPL-SCNC: 128 MMOL/L (ref 136–145)
T WAVE AXIS: -39 DEGREES
TR MAX PG: 29 MMHG
TRICUSPID VALVE PEAK REGURGITATION VELOCITY: 2.69 M/S
VENTRICULAR RATE: 110 BPM
WBC # BLD AUTO: 21.76 THOUSAND/UL (ref 4.31–10.16)
Z-SCORE OF INTERVENTRICULAR SEPTUM IN END DIASTOLE: 2.54
Z-SCORE OF LEFT VENTRICULAR DIMENSION IN END SYSTOLE: -15.24
Z-SCORE OF LEFT VENTRICULAR POSTERIOR WALL IN END DIASTOLE: 2.66

## 2022-02-07 PROCEDURE — 84145 PROCALCITONIN (PCT): CPT | Performed by: STUDENT IN AN ORGANIZED HEALTH CARE EDUCATION/TRAINING PROGRAM

## 2022-02-07 PROCEDURE — 93306 TTE W/DOPPLER COMPLETE: CPT

## 2022-02-07 PROCEDURE — 94660 CPAP INITIATION&MGMT: CPT

## 2022-02-07 PROCEDURE — 94640 AIRWAY INHALATION TREATMENT: CPT

## 2022-02-07 PROCEDURE — 84484 ASSAY OF TROPONIN QUANT: CPT | Performed by: NURSE PRACTITIONER

## 2022-02-07 PROCEDURE — 94760 N-INVAS EAR/PLS OXIMETRY 1: CPT

## 2022-02-07 PROCEDURE — 99222 1ST HOSP IP/OBS MODERATE 55: CPT | Performed by: INTERNAL MEDICINE

## 2022-02-07 PROCEDURE — 82948 REAGENT STRIP/BLOOD GLUCOSE: CPT

## 2022-02-07 PROCEDURE — 93306 TTE W/DOPPLER COMPLETE: CPT | Performed by: INTERNAL MEDICINE

## 2022-02-07 PROCEDURE — 93010 ELECTROCARDIOGRAM REPORT: CPT | Performed by: INTERNAL MEDICINE

## 2022-02-07 PROCEDURE — 84484 ASSAY OF TROPONIN QUANT: CPT | Performed by: EMERGENCY MEDICINE

## 2022-02-07 PROCEDURE — 80053 COMPREHEN METABOLIC PANEL: CPT | Performed by: NURSE PRACTITIONER

## 2022-02-07 PROCEDURE — 85025 COMPLETE CBC W/AUTO DIFF WBC: CPT | Performed by: NURSE PRACTITIONER

## 2022-02-07 PROCEDURE — 99223 1ST HOSP IP/OBS HIGH 75: CPT | Performed by: STUDENT IN AN ORGANIZED HEALTH CARE EDUCATION/TRAINING PROGRAM

## 2022-02-07 PROCEDURE — 36415 COLL VENOUS BLD VENIPUNCTURE: CPT | Performed by: EMERGENCY MEDICINE

## 2022-02-07 RX ORDER — DIGOXIN 125 MCG
250 TABLET ORAL DAILY
Status: DISCONTINUED | OUTPATIENT
Start: 2022-02-07 | End: 2022-02-08 | Stop reason: HOSPADM

## 2022-02-07 RX ORDER — ASPIRIN 81 MG/1
81 TABLET, CHEWABLE ORAL DAILY
Status: DISCONTINUED | OUTPATIENT
Start: 2022-02-07 | End: 2022-02-08 | Stop reason: HOSPADM

## 2022-02-07 RX ORDER — CYCLOBENZAPRINE HCL 10 MG
10 TABLET ORAL 3 TIMES DAILY PRN
Status: DISCONTINUED | OUTPATIENT
Start: 2022-02-07 | End: 2022-02-08 | Stop reason: HOSPADM

## 2022-02-07 RX ORDER — FLUTICASONE FUROATE AND VILANTEROL 100; 25 UG/1; UG/1
1 POWDER RESPIRATORY (INHALATION) DAILY
Status: DISCONTINUED | OUTPATIENT
Start: 2022-02-07 | End: 2022-02-08 | Stop reason: HOSPADM

## 2022-02-07 RX ORDER — QUETIAPINE FUMARATE 100 MG/1
300 TABLET, FILM COATED ORAL
Status: DISCONTINUED | OUTPATIENT
Start: 2022-02-07 | End: 2022-02-08 | Stop reason: HOSPADM

## 2022-02-07 RX ORDER — FUROSEMIDE 10 MG/ML
40 INJECTION INTRAMUSCULAR; INTRAVENOUS 2 TIMES DAILY
Status: DISCONTINUED | OUTPATIENT
Start: 2022-02-07 | End: 2022-02-07

## 2022-02-07 RX ORDER — GABAPENTIN 300 MG/1
300 CAPSULE ORAL 2 TIMES DAILY
Status: DISCONTINUED | OUTPATIENT
Start: 2022-02-07 | End: 2022-02-08 | Stop reason: HOSPADM

## 2022-02-07 RX ORDER — METHYLPREDNISOLONE SODIUM SUCCINATE 40 MG/ML
40 INJECTION, POWDER, LYOPHILIZED, FOR SOLUTION INTRAMUSCULAR; INTRAVENOUS DAILY
Status: DISCONTINUED | OUTPATIENT
Start: 2022-02-07 | End: 2022-02-08 | Stop reason: HOSPADM

## 2022-02-07 RX ORDER — BUSPIRONE HYDROCHLORIDE 10 MG/1
10 TABLET ORAL 2 TIMES DAILY
Status: DISCONTINUED | OUTPATIENT
Start: 2022-02-07 | End: 2022-02-08 | Stop reason: HOSPADM

## 2022-02-07 RX ORDER — INSULIN GLARGINE 100 [IU]/ML
75 INJECTION, SOLUTION SUBCUTANEOUS EVERY 12 HOURS SCHEDULED
Status: DISCONTINUED | OUTPATIENT
Start: 2022-02-07 | End: 2022-02-08 | Stop reason: HOSPADM

## 2022-02-07 RX ORDER — LOSARTAN POTASSIUM 50 MG/1
50 TABLET ORAL DAILY
Status: DISCONTINUED | OUTPATIENT
Start: 2022-02-07 | End: 2022-02-08

## 2022-02-07 RX ORDER — ALPRAZOLAM 0.5 MG/1
2 TABLET ORAL
Status: DISCONTINUED | OUTPATIENT
Start: 2022-02-07 | End: 2022-02-08 | Stop reason: HOSPADM

## 2022-02-07 RX ORDER — ACETAMINOPHEN 325 MG/1
650 TABLET ORAL EVERY 6 HOURS PRN
Status: DISCONTINUED | OUTPATIENT
Start: 2022-02-07 | End: 2022-02-08 | Stop reason: HOSPADM

## 2022-02-07 RX ORDER — CEFTRIAXONE 1 G/50ML
1000 INJECTION, SOLUTION INTRAVENOUS EVERY 24 HOURS
Status: DISCONTINUED | OUTPATIENT
Start: 2022-02-07 | End: 2022-02-07

## 2022-02-07 RX ORDER — CHLORAL HYDRATE 500 MG
2000 CAPSULE ORAL 2 TIMES DAILY
Status: DISCONTINUED | OUTPATIENT
Start: 2022-02-07 | End: 2022-02-08 | Stop reason: HOSPADM

## 2022-02-07 RX ORDER — METOPROLOL SUCCINATE 100 MG/1
200 TABLET, EXTENDED RELEASE ORAL DAILY
Status: DISCONTINUED | OUTPATIENT
Start: 2022-02-07 | End: 2022-02-08 | Stop reason: HOSPADM

## 2022-02-07 RX ORDER — MELATONIN
1000 DAILY
Status: DISCONTINUED | OUTPATIENT
Start: 2022-02-07 | End: 2022-02-08 | Stop reason: HOSPADM

## 2022-02-07 RX ORDER — IPRATROPIUM BROMIDE AND ALBUTEROL SULFATE 2.5; .5 MG/3ML; MG/3ML
3 SOLUTION RESPIRATORY (INHALATION)
Status: DISCONTINUED | OUTPATIENT
Start: 2022-02-08 | End: 2022-02-08 | Stop reason: HOSPADM

## 2022-02-07 RX ORDER — ASCORBIC ACID 500 MG
1000 TABLET ORAL DAILY
Status: DISCONTINUED | OUTPATIENT
Start: 2022-02-07 | End: 2022-02-08 | Stop reason: HOSPADM

## 2022-02-07 RX ORDER — QUETIAPINE FUMARATE 50 MG/1
100 TABLET, EXTENDED RELEASE ORAL EVERY MORNING
Status: DISCONTINUED | OUTPATIENT
Start: 2022-02-07 | End: 2022-02-08 | Stop reason: HOSPADM

## 2022-02-07 RX ORDER — ALBUTEROL SULFATE 2.5 MG/3ML
2.5 SOLUTION RESPIRATORY (INHALATION) EVERY 6 HOURS PRN
Status: DISCONTINUED | OUTPATIENT
Start: 2022-02-07 | End: 2022-02-08 | Stop reason: HOSPADM

## 2022-02-07 RX ORDER — ATORVASTATIN CALCIUM 80 MG/1
80 TABLET, FILM COATED ORAL DAILY
Status: DISCONTINUED | OUTPATIENT
Start: 2022-02-07 | End: 2022-02-08 | Stop reason: HOSPADM

## 2022-02-07 RX ADMIN — INSULIN LISPRO 6 UNITS: 100 INJECTION, SOLUTION INTRAVENOUS; SUBCUTANEOUS at 21:29

## 2022-02-07 RX ADMIN — QUETIAPINE FUMARATE 300 MG: 100 TABLET ORAL at 01:44

## 2022-02-07 RX ADMIN — METHYLPREDNISOLONE SODIUM SUCCINATE 40 MG: 40 INJECTION, POWDER, FOR SOLUTION INTRAMUSCULAR; INTRAVENOUS at 16:55

## 2022-02-07 RX ADMIN — IPRATROPIUM BROMIDE AND ALBUTEROL SULFATE 3 ML: 2.5; .5 SOLUTION RESPIRATORY (INHALATION) at 20:18

## 2022-02-07 RX ADMIN — INSULIN GLARGINE 75 UNITS: 100 INJECTION, SOLUTION SUBCUTANEOUS at 01:44

## 2022-02-07 RX ADMIN — INSULIN GLARGINE 75 UNITS: 100 INJECTION, SOLUTION SUBCUTANEOUS at 21:28

## 2022-02-07 RX ADMIN — ACETAMINOPHEN 650 MG: 325 TABLET, FILM COATED ORAL at 10:03

## 2022-02-07 RX ADMIN — OMEGA-3 FATTY ACIDS CAP 1000 MG 2000 MG: 1000 CAP at 09:59

## 2022-02-07 RX ADMIN — Medication 1000 UNITS: at 09:55

## 2022-02-07 RX ADMIN — INSULIN LISPRO 6 UNITS: 100 INJECTION, SOLUTION INTRAVENOUS; SUBCUTANEOUS at 08:11

## 2022-02-07 RX ADMIN — OMEGA-3 FATTY ACIDS CAP 1000 MG 2000 MG: 1000 CAP at 18:22

## 2022-02-07 RX ADMIN — IPRATROPIUM BROMIDE AND ALBUTEROL SULFATE 3 ML: 2.5; .5 SOLUTION RESPIRATORY (INHALATION) at 02:10

## 2022-02-07 RX ADMIN — INSULIN GLARGINE 75 UNITS: 100 INJECTION, SOLUTION SUBCUTANEOUS at 10:12

## 2022-02-07 RX ADMIN — OXYCODONE HYDROCHLORIDE AND ACETAMINOPHEN 1000 MG: 500 TABLET ORAL at 09:59

## 2022-02-07 RX ADMIN — APIXABAN 5 MG: 5 TABLET, FILM COATED ORAL at 09:56

## 2022-02-07 RX ADMIN — CEFTRIAXONE 1000 MG: 1 INJECTION, SOLUTION INTRAVENOUS at 09:52

## 2022-02-07 RX ADMIN — APIXABAN 5 MG: 5 TABLET, FILM COATED ORAL at 18:22

## 2022-02-07 RX ADMIN — QUETIAPINE FUMARATE 300 MG: 100 TABLET ORAL at 21:28

## 2022-02-07 RX ADMIN — DIGOXIN 250 MCG: 125 TABLET ORAL at 09:56

## 2022-02-07 RX ADMIN — METOPROLOL TARTRATE 5 MG: 5 INJECTION INTRAVENOUS at 00:01

## 2022-02-07 RX ADMIN — IPRATROPIUM BROMIDE AND ALBUTEROL SULFATE 3 ML: 2.5; .5 SOLUTION RESPIRATORY (INHALATION) at 13:18

## 2022-02-07 RX ADMIN — ACETAMINOPHEN 650 MG: 325 TABLET, FILM COATED ORAL at 16:30

## 2022-02-07 RX ADMIN — METOPROLOL SUCCINATE 200 MG: 100 TABLET, EXTENDED RELEASE ORAL at 09:55

## 2022-02-07 RX ADMIN — ASPIRIN 81 MG CHEWABLE TABLET 81 MG: 81 TABLET CHEWABLE at 09:55

## 2022-02-07 RX ADMIN — BUSPIRONE HYDROCHLORIDE 10 MG: 10 TABLET ORAL at 18:22

## 2022-02-07 RX ADMIN — ATORVASTATIN CALCIUM 80 MG: 80 TABLET, FILM COATED ORAL at 09:59

## 2022-02-07 RX ADMIN — INSULIN LISPRO 2 UNITS: 100 INJECTION, SOLUTION INTRAVENOUS; SUBCUTANEOUS at 01:45

## 2022-02-07 RX ADMIN — DICLOFENAC SODIUM 2 G: 10 GEL TOPICAL at 16:31

## 2022-02-07 RX ADMIN — FUROSEMIDE 40 MG: 10 INJECTION, SOLUTION INTRAVENOUS at 10:01

## 2022-02-07 RX ADMIN — ALPRAZOLAM 2 MG: 0.5 TABLET ORAL at 22:07

## 2022-02-07 RX ADMIN — INSULIN LISPRO 10 UNITS: 100 INJECTION, SOLUTION INTRAVENOUS; SUBCUTANEOUS at 12:13

## 2022-02-07 RX ADMIN — CYCLOBENZAPRINE HYDROCHLORIDE 10 MG: 10 TABLET, FILM COATED ORAL at 16:31

## 2022-02-07 RX ADMIN — CYCLOBENZAPRINE HYDROCHLORIDE 10 MG: 10 TABLET, FILM COATED ORAL at 10:03

## 2022-02-07 RX ADMIN — QUETIAPINE FUMARATE 100 MG: 50 TABLET, EXTENDED RELEASE ORAL at 10:04

## 2022-02-07 RX ADMIN — LOSARTAN POTASSIUM 50 MG: 50 TABLET, FILM COATED ORAL at 09:59

## 2022-02-07 RX ADMIN — INSULIN LISPRO 6 UNITS: 100 INJECTION, SOLUTION INTRAVENOUS; SUBCUTANEOUS at 16:47

## 2022-02-07 RX ADMIN — GABAPENTIN 300 MG: 300 CAPSULE ORAL at 09:59

## 2022-02-07 RX ADMIN — BUSPIRONE HYDROCHLORIDE 10 MG: 10 TABLET ORAL at 09:59

## 2022-02-07 RX ADMIN — SERTRALINE HYDROCHLORIDE 50 MG: 50 TABLET ORAL at 21:28

## 2022-02-07 RX ADMIN — GABAPENTIN 300 MG: 300 CAPSULE ORAL at 18:22

## 2022-02-07 RX ADMIN — SERTRALINE HYDROCHLORIDE 50 MG: 50 TABLET ORAL at 01:44

## 2022-02-07 RX ADMIN — FLUTICASONE FUROATE AND VILANTEROL TRIFENATATE 1 PUFF: 100; 25 POWDER RESPIRATORY (INHALATION) at 10:04

## 2022-02-07 NOTE — ASSESSMENT & PLAN NOTE
Patient meets SIRS criteria with tachycardia, leukocytosis  Monitor fever curve and white count  No clear source of infection

## 2022-02-07 NOTE — CASE MANAGEMENT
Case Management Progress Note    Patient name Shilpi Perry  Location [address] ZUXEH 538-* MRN [id number]  : 1960 Date 2022       LOS (days): 0  Geometric Mean LOS (GMLOS) (days):   Days to [address]:        OBJECTIVE:        Current admission status: Inpatient  Preferred Pharmacy:   [address] [place] [id number]  Phone: [phone] Fax: [phone]    Primary Care Provider: Matthias Goldmann, MD    Primary Insurance: [facility]   Secondary Insurance:     PROGRESS NOTE:    CM provided patient with phone numbers for [facility] [place] [facility] [place], the [employer] and his emergency contact [facility] number as requested
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
## 2022-02-07 NOTE — ASSESSMENT & PLAN NOTE
Lab Results   Component Value Date    HGBA1C 7 1 (H) 07/16/2021       No results for input(s): POCGLU in the last 72 hours  Blood Sugar Average: Last 72 hrs:   continue Lantus 75 units b i d    Patient reports using a sliding scale with meals  Accu-Cheks a c  HS with insulin sliding scale
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
## 2022-02-07 NOTE — ASSESSMENT & PLAN NOTE
Wt Readings from Last 3 Encounters:   02/06/22 (!) 137 kg (301 lb 2 4 oz)   01/11/22 136 kg (300 lb)   12/09/21 127 kg (280 lb)   Patient presented to the ER with complaints of shortness of breath, weight gain, palpitations which started today  He reports gaining about 20 lb  Echo of October 2020 revealed an EF of 50-55% with no wall motion abnormalities, left atrial dilatation  Patient reports drinking a lot of fluids, compliance with Demadex  Chest x-ray pending    · Admit to telemetry  · Continue lasix 40 mg IV b i d   · Daily Weight  · Intake and output  · Low sodium diet  · Continue beta-blocker

## 2022-02-07 NOTE — PLAN OF CARE
Problem: MOBILITY - ADULT  Goal: Maintain or return to baseline ADL function  Description: INTERVENTIONS:  -  Assess patient's ability to carry out ADLs; assess patient's baseline for ADL function and identify physical deficits which impact ability to perform ADLs (bathing, care of mouth/teeth, toileting, grooming, dressing, etc )  - Assess/evaluate cause of self-care deficits   - Assess range of motion  - Assess patient's mobility; develop plan if impaired  - Assess patient's need for assistive devices and provide as appropriate  - Encourage maximum independence but intervene and supervise when necessary  - Involve family in performance of ADLs  - Assess for home care needs following discharge   - Consider OT consult to assist with ADL evaluation and planning for discharge  - Provide patient education as appropriate  Outcome: Progressing  Goal: Maintains/Returns to pre admission functional level  Description: INTERVENTIONS:  - Perform BMAT or MOVE assessment daily    - Set and communicate daily mobility goal to care team and patient/family/caregiver     Problem: Potential for Falls  Goal: Patient will remain free of falls  Description: INTERVENTIONS:  - Educate patient/family on patient safety including physical limitations  - Instruct patient to call for assistance with activity   - Consult OT/PT to assist with strengthening/mobility   - Keep Call bell within reach  - Keep bed low and locked with side rails adjusted as appropriate  - Keep care items and personal belongings within reach  - Initiate and maintain comfort rounds  - Make Fall Risk Sign visible to staff  - Offer Toileting every 2 Hours, in advance of need  - Initiate/Maintain bed alarm  - Obtain necessary fall risk management equipment: yellow socks  - Apply yellow socks and bracelet for high fall risk patients  - Consider moving patient to room near nurses station  Outcome: Progressing     Problem: PAIN - ADULT  Goal: Verbalizes/displays adequate comfort level or baseline comfort level  Description: Interventions:  - Encourage patient to monitor pain and request assistance  - Assess pain using appropriate pain scale  - Administer analgesics based on type and severity of pain and evaluate response  - Implement non-pharmacological measures as appropriate and evaluate response  - Consider cultural and social influences on pain and pain management  - Notify physician/advanced practitioner if interventions unsuccessful or patient reports new pain  Outcome: Progressing     Problem: INFECTION - ADULT  Goal: Absence or prevention of progression during hospitalization  Description: INTERVENTIONS:  - Assess and monitor for signs and symptoms of infection  - Monitor lab/diagnostic results  - Monitor all insertion sites, i e  indwelling lines, tubes, and drains  - Monitor endotracheal if appropriate and nasal secretions for changes in amount and color  - [place] appropriate cooling/warming therapies per order  - Administer medications as ordered  - Instruct and encourage patient and family to use good hand hygiene technique  - Identify and instruct in appropriate isolation precautions for identified infection/condition  Outcome: Progressing     Problem: SAFETY ADULT  Goal: Maintain or return to baseline ADL function  Description: INTERVENTIONS:  -  Assess patient's ability to carry out ADLs; assess patient's baseline for ADL function and identify physical deficits which impact ability to perform ADLs (bathing, care of mouth/teeth, toileting, grooming, dressing, etc )  - Assess/evaluate cause of self-care deficits   - Assess range of motion  - Assess patient's mobility; develop plan if impaired  - Assess patient's need for assistive devices and provide as appropriate  - Encourage maximum independence but intervene and supervise when necessary  - Involve family in performance of ADLs  - Assess for home care needs following discharge   - Consider OT consult to assist with ADL evaluation and planning for discharge  - Provide patient education as appropriate  Outcome: Progressing  Goal: Maintains/Returns to pre admission functional level  Description: INTERVENTIONS:  - Perform BMAT or MOVE assessment daily    - Set and communicate daily mobility goal to care team and patient/family/caregiver  - Collaborate with rehabilitation services on mobility goals if consulted  - Perform Range of Motion 4 times a day  - Reposition patient every 2 hours    - Dangle patient 3 times a day  - Stand patient 3 times a day  - Ambulate patient 3 times a day  - Out of bed to chair 3 times a day   - Out of bed for meals 3 times a day  - Out of bed for toileting  - Record patient progress and toleration of activity level   Outcome: Progressing  Goal: Patient will remain free of falls  Description: INTERVENTIONS:  - Educate patient/family on patient safety including physical limitations  - Instruct patient to call for assistance with activity   - Consult OT/PT to assist with strengthening/mobility   - Keep Call bell within reach  - Keep bed low and locked with side rails adjusted as appropriate  - Keep care items and personal belongings within reach  - Initiate and maintain comfort rounds  - Make Fall Risk Sign visible to staff  - Offer Toileting every 2 Hours, in advance of need  - Initiate/Maintain bed alarm  - Obtain necessary fall risk management equipment: yellow socks  - Apply yellow socks and bracelet for high fall risk patients  - Consider moving patient to room near nurses station  Outcome: Progressing     Problem: DISCHARGE PLANNING  Goal: Discharge to home or other facility with appropriate resources  Description: INTERVENTIONS:  - Identify barriers to discharge w/patient and caregiver  - Arrange for needed discharge resources and transportation as appropriate  - Identify discharge learning needs (meds, wound care, etc )  - Arrange for interpretive services to assist at discharge as needed  - Refer to Case Management Department for coordinating discharge planning if the patient needs post-hospital services based on physician/advanced practitioner order or complex needs related to functional status, cognitive ability, or social support system  Outcome: Progressing     Problem: Knowledge Deficit  Goal: Patient/family/caregiver demonstrates understanding of disease process, treatment plan, medications, and discharge instructions  Description: Complete learning assessment and assess knowledge base  Interventions:  - Provide teaching at level of understanding  - Provide teaching via preferred learning methods  Outcome: Progressing     - Collaborate with rehabilitation services on mobility goals if consulted  - Perform Range of Motion 4 times a day  - Reposition patient every 2 hours    - Dangle patient 3 times a day  - Stand patient 3 times a day  - Ambulate patient 3 times a day  - Out of bed to chair 3 times a day   - Out of bed for meals 3 times a day  - Out of bed for toileting  - Record patient progress and toleration of activity level   Outcome: Progressing
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
## 2022-02-07 NOTE — ASSESSMENT & PLAN NOTE
Lab Results   Component Value Date    HGBA1C 7 1 (H) 07/16/2021       No results for input(s): POCGLU in the last 72 hours      Blood Sugar Average: Last 72 hrs:   continue gabapentin
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
## 2022-02-08 VITALS
BODY MASS INDEX: 42.14 KG/M2 | TEMPERATURE: 97.3 F | WEIGHT: 301 LBS | DIASTOLIC BLOOD PRESSURE: 58 MMHG | SYSTOLIC BLOOD PRESSURE: 116 MMHG | OXYGEN SATURATION: 97 % | RESPIRATION RATE: 18 BRPM | HEIGHT: 71 IN | HEART RATE: 75 BPM

## 2022-02-08 PROBLEM — I50.43 ACUTE ON CHRONIC COMBINED SYSTOLIC AND DIASTOLIC HEART FAILURE (HCC): Status: RESOLVED | Noted: 2020-02-03 | Resolved: 2022-02-08

## 2022-02-08 LAB
ANION GAP SERPL CALCULATED.3IONS-SCNC: 5 MMOL/L (ref 4–13)
BUN SERPL-MCNC: 38 MG/DL (ref 5–25)
CALCIUM SERPL-MCNC: 8.9 MG/DL (ref 8.3–10.1)
CHLORIDE SERPL-SCNC: 96 MMOL/L (ref 100–108)
CO2 SERPL-SCNC: 29 MMOL/L (ref 21–32)
CREAT SERPL-MCNC: 1.09 MG/DL (ref 0.6–1.3)
ERYTHROCYTE [DISTWIDTH] IN BLOOD BY AUTOMATED COUNT: 13.1 % (ref 11.6–15.1)
GFR SERPL CREATININE-BSD FRML MDRD: 72 ML/MIN/1.73SQ M
GLUCOSE SERPL-MCNC: 180 MG/DL (ref 65–140)
GLUCOSE SERPL-MCNC: 238 MG/DL (ref 65–140)
GLUCOSE SERPL-MCNC: 327 MG/DL (ref 65–140)
GLUCOSE SERPL-MCNC: 339 MG/DL (ref 65–140)
HCT VFR BLD AUTO: 37.7 % (ref 36.5–49.3)
HGB BLD-MCNC: 12.9 G/DL (ref 12–17)
MAGNESIUM SERPL-MCNC: 1.5 MG/DL (ref 1.6–2.6)
MCH RBC QN AUTO: 32.3 PG (ref 26.8–34.3)
MCHC RBC AUTO-ENTMCNC: 34.2 G/DL (ref 31.4–37.4)
MCV RBC AUTO: 95 FL (ref 82–98)
NRBC BLD AUTO-RTO: 0 /100 WBCS
PLATELET # BLD AUTO: 171 THOUSANDS/UL (ref 149–390)
PMV BLD AUTO: 9.9 FL (ref 8.9–12.7)
POTASSIUM SERPL-SCNC: 4.6 MMOL/L (ref 3.5–5.3)
PROCALCITONIN SERPL-MCNC: 0.09 NG/ML
RBC # BLD AUTO: 3.99 MILLION/UL (ref 3.88–5.62)
SODIUM SERPL-SCNC: 130 MMOL/L (ref 136–145)
WBC # BLD AUTO: 25.95 THOUSAND/UL (ref 4.31–10.16)

## 2022-02-08 PROCEDURE — 82948 REAGENT STRIP/BLOOD GLUCOSE: CPT

## 2022-02-08 PROCEDURE — 83735 ASSAY OF MAGNESIUM: CPT | Performed by: NURSE PRACTITIONER

## 2022-02-08 PROCEDURE — 94640 AIRWAY INHALATION TREATMENT: CPT

## 2022-02-08 PROCEDURE — 85027 COMPLETE CBC AUTOMATED: CPT | Performed by: STUDENT IN AN ORGANIZED HEALTH CARE EDUCATION/TRAINING PROGRAM

## 2022-02-08 PROCEDURE — 80048 BASIC METABOLIC PNL TOTAL CA: CPT | Performed by: STUDENT IN AN ORGANIZED HEALTH CARE EDUCATION/TRAINING PROGRAM

## 2022-02-08 PROCEDURE — 99239 HOSP IP/OBS DSCHRG MGMT >30: CPT | Performed by: STUDENT IN AN ORGANIZED HEALTH CARE EDUCATION/TRAINING PROGRAM

## 2022-02-08 PROCEDURE — 99232 SBSQ HOSP IP/OBS MODERATE 35: CPT | Performed by: INTERNAL MEDICINE

## 2022-02-08 PROCEDURE — 84145 PROCALCITONIN (PCT): CPT | Performed by: STUDENT IN AN ORGANIZED HEALTH CARE EDUCATION/TRAINING PROGRAM

## 2022-02-08 PROCEDURE — 94760 N-INVAS EAR/PLS OXIMETRY 1: CPT

## 2022-02-08 RX ORDER — TORSEMIDE 20 MG/1
40 TABLET ORAL DAILY
Status: DISCONTINUED | OUTPATIENT
Start: 2022-02-08 | End: 2022-02-08 | Stop reason: HOSPADM

## 2022-02-08 RX ORDER — MAGNESIUM SULFATE 1 G/100ML
1 INJECTION INTRAVENOUS ONCE
Status: COMPLETED | OUTPATIENT
Start: 2022-02-08 | End: 2022-02-08

## 2022-02-08 RX ORDER — TORSEMIDE 20 MG/1
40 TABLET ORAL DAILY
Qty: 14 TABLET | Refills: 0 | Status: SHIPPED | OUTPATIENT
Start: 2022-02-09 | End: 2022-03-24

## 2022-02-08 RX ORDER — TORSEMIDE 20 MG/1
20 TABLET ORAL
Status: DISCONTINUED | OUTPATIENT
Start: 2022-02-08 | End: 2022-02-08

## 2022-02-08 RX ADMIN — CYCLOBENZAPRINE HYDROCHLORIDE 10 MG: 10 TABLET, FILM COATED ORAL at 01:32

## 2022-02-08 RX ADMIN — INSULIN LISPRO 8 UNITS: 100 INJECTION, SOLUTION INTRAVENOUS; SUBCUTANEOUS at 11:47

## 2022-02-08 RX ADMIN — QUETIAPINE FUMARATE 100 MG: 50 TABLET, EXTENDED RELEASE ORAL at 08:52

## 2022-02-08 RX ADMIN — METOPROLOL SUCCINATE 200 MG: 100 TABLET, EXTENDED RELEASE ORAL at 08:49

## 2022-02-08 RX ADMIN — METHYLPREDNISOLONE SODIUM SUCCINATE 40 MG: 40 INJECTION, POWDER, FOR SOLUTION INTRAMUSCULAR; INTRAVENOUS at 08:50

## 2022-02-08 RX ADMIN — ATORVASTATIN CALCIUM 80 MG: 80 TABLET, FILM COATED ORAL at 08:49

## 2022-02-08 RX ADMIN — INSULIN LISPRO 2 UNITS: 100 INJECTION, SOLUTION INTRAVENOUS; SUBCUTANEOUS at 08:09

## 2022-02-08 RX ADMIN — OXYCODONE HYDROCHLORIDE AND ACETAMINOPHEN 1000 MG: 500 TABLET ORAL at 08:49

## 2022-02-08 RX ADMIN — DICLOFENAC SODIUM 2 G: 10 GEL TOPICAL at 08:52

## 2022-02-08 RX ADMIN — DIGOXIN 250 MCG: 125 TABLET ORAL at 08:49

## 2022-02-08 RX ADMIN — TORSEMIDE 40 MG: 20 TABLET ORAL at 11:47

## 2022-02-08 RX ADMIN — GABAPENTIN 300 MG: 300 CAPSULE ORAL at 08:49

## 2022-02-08 RX ADMIN — ACETAMINOPHEN 650 MG: 325 TABLET, FILM COATED ORAL at 01:32

## 2022-02-08 RX ADMIN — APIXABAN 5 MG: 5 TABLET, FILM COATED ORAL at 08:49

## 2022-02-08 RX ADMIN — INSULIN GLARGINE 75 UNITS: 100 INJECTION, SOLUTION SUBCUTANEOUS at 08:51

## 2022-02-08 RX ADMIN — Medication 1000 UNITS: at 08:49

## 2022-02-08 RX ADMIN — DICLOFENAC SODIUM 2 G: 10 GEL TOPICAL at 01:33

## 2022-02-08 RX ADMIN — BUSPIRONE HYDROCHLORIDE 10 MG: 10 TABLET ORAL at 08:49

## 2022-02-08 RX ADMIN — ACETAMINOPHEN 650 MG: 325 TABLET, FILM COATED ORAL at 08:49

## 2022-02-08 RX ADMIN — IPRATROPIUM BROMIDE AND ALBUTEROL SULFATE 3 ML: 2.5; .5 SOLUTION RESPIRATORY (INHALATION) at 08:07

## 2022-02-08 RX ADMIN — CYCLOBENZAPRINE HYDROCHLORIDE 10 MG: 10 TABLET, FILM COATED ORAL at 08:51

## 2022-02-08 RX ADMIN — INSULIN LISPRO 4 UNITS: 100 INJECTION, SOLUTION INTRAVENOUS; SUBCUTANEOUS at 01:35

## 2022-02-08 RX ADMIN — ASPIRIN 81 MG CHEWABLE TABLET 81 MG: 81 TABLET CHEWABLE at 08:49

## 2022-02-08 RX ADMIN — OMEGA-3 FATTY ACIDS CAP 1000 MG 2000 MG: 1000 CAP at 08:49

## 2022-02-08 RX ADMIN — MAGNESIUM SULFATE HEPTAHYDRATE 1 G: 1 INJECTION, SOLUTION INTRAVENOUS at 08:53

## 2022-02-08 NOTE — PROGRESS NOTES
Progress Note - Cardiology   [facility]     Juliano Spain 57 y o  male MRN: [id number]  : 1960  Unit/Bed#: [address] [place] [address] Encounter: [id number]    Assessment and Plan:   1  Acute on chronic diastolic heart failure:  Resolved  Echo demonstrates EF of 55%    -  continue Toprol  mg daily    -  resume Demadex at 40 mg daily    -  encourage low-sodium diet    -  daily weights at home    2  Chronic atrial fibrillation:  Rates controlled, severe atrial dilatation seen on echocardiogram    -  continue Eliquis 5 mg b i d     -  continue Toprol  mg daily    -  continue digoxin 0 25 mg daily    3  COPD:  Managed per primary team    4  Dyslipidemia:  Continue Lipitor 80 mg once a day and fish oil 2000 mg a twice a day    5  Diabetes:  Managed per primary team    6  Morbid obesity:  BMI 41 9    Subjective / Objective:   Patient seen and examined  He is sitting at the bedside in no distress  Lower extremity edema has resolved  Heart rate has improved with diuresis  Vitals: Blood pressure 112/91, pulse 82, temperature (!) 97 3 °F (36 3 °C), resp  rate 18, height 5' 11" (1 803 m), weight (!) 137 kg (301 lb), SpO2 96 %  Vitals:    22 0119 22 1024   Weight: (!) 137 kg (301 lb) (!) 137 kg (301 lb)     Body mass index is 41 98 kg/m²  BP Readings from Last 3 Encounters:   22 112/91   22 130/80   21 167/77     Orthostatic Blood Pressures      Most Recent Value   Blood Pressure 112/91 filed at 2022 0806   Patient Position - Orthostatic VS Lying filed at 2022 0740        I/O       / 0701  / 0700 / 0701  /08 0700 /08 0701  / 0700    P  O   1135     Total Intake(mL/kg)  1135 (8 3)     Urine (mL/kg/hr) 1100 4125 (1 3) 775 (1 9)    Total Output 1100 4125 775    Net -1100 -2990 -775           Unmeasured Urine Occurrence 775 x          Invasive Devices  Report    Peripheral Intravenous Line            Peripheral IV 22 Dorsal (posterior); Right Hand 1 day                  Intake/Output Summary (Last 24 hours) at 2/8/2022 1002  Last data filed at 2/8/2022 0806  Gross per 24 hour   Intake 935 ml   Output 3725 ml   Net -2790 ml         Physical Exam:   Physical Exam  Vitals and nursing note reviewed  Constitutional:       Appearance: Normal appearance  He is well-developed  He is morbidly obese  HENT:      Head: Normocephalic  Right Ear: External ear normal       Left Ear: External ear normal    Eyes:      General: No scleral icterus  Right eye: No discharge  Left eye: No discharge  Neck:      Thyroid: No thyromegaly  Cardiovascular:      Rate and Rhythm: Normal rate  Rhythm irregularly irregular  Pulses: Normal pulses  Pulmonary:      Effort: Pulmonary effort is normal  No respiratory distress  Breath sounds: Normal breath sounds  No wheezing  Abdominal:      General: Bowel sounds are normal  There is no distension  Palpations: Abdomen is soft  Musculoskeletal:      Cervical back: Normal range of motion and neck supple  Right lower leg: Edema present  Left lower leg: Edema present  Comments: Trace pedal and ankle edema   Skin:     General: Skin is warm and dry  Capillary Refill: Capillary refill takes less than 2 seconds  Neurological:      General: No focal deficit present  Mental Status: He is alert and oriented to person, place, and time  Mental status is at baseline                   Medications/ Allergies:     Current Facility-Administered Medications   Medication Dose Route Frequency Provider Last Rate    acetaminophen  650 mg Oral Q6H PRN MANAS Santana      albuterol  2 5 mg Nebulization Q6H PRN MANAS Santana      ALPRAZolam  2 mg Oral HS PRN MANAS Santana      apixaban  5 mg Oral BID MANAS Santana      vitamin C  1,000 mg Oral Daily MANAS Santana      aspirin  81 mg Oral Daily MANAS Santana      atorvastatin  80 mg Oral Daily Stann Curet, CRNP      busPIRone  10 mg Oral BID Stann Curet, CRNP      cholecalciferol  1,000 Units Oral Daily Stann Curet, CRNP      cyclobenzaprine  10 mg Oral TID PRN Stann Curet, CRNP      Diclofenac Sodium  2 g Topical 4x Daily PRN Agacat Awan, DO      digoxin  250 mcg Oral Daily Stann Curet, CRNP      fish oil  2,000 mg Oral BID Stann Curet, CRNP      fluticasone-vilanterol  1 puff Inhalation Daily Stann Curet, CRNP      gabapentin  300 mg Oral BID Stann Curet, CRNP      insulin glargine  75 Units Subcutaneous Q12H [facility] Stann Curet, CRNP      insulin lispro  2-12 Units Subcutaneous TID AC Stann Curet, CRNP      insulin lispro  2-12 Units Subcutaneous HS Stann Curet, CRNP      insulin lispro  2-12 Units Subcutaneous 0200 Stann Curet, CRNP      ipratropium-albuterol  3 mL Nebulization TID Jef Sharp, DO      liraglutide  1 8 mg Subcutaneous HS Stann Curet, CRNP      magnesium sulfate  1 g Intravenous Once Jef Sharp, DO      methylPREDNISolone sodium succinate  40 mg Intravenous Daily Jef Sharp, DO      metoprolol succinate  200 mg Oral Daily Stann Curet, CRNP      QUEtiapine  100 mg Oral QAM Stann Curet, CRNP      QUEtiapine  300 mg Oral HS Stann Curet, CRNP      sertraline  50 mg Oral HS Stann Curet, CRNP      torsemide  20 mg Oral BID (diuretic) Lila Mendez, CRNP       acetaminophen, 650 mg, Q6H PRN  albuterol, 2 5 mg, Q6H PRN  ALPRAZolam, 2 mg, HS PRN  cyclobenzaprine, 10 mg, TID PRN  Diclofenac Sodium, 2 g, 4x Daily PRN      Allergies   Allergen Reactions    Wellbutrin [Bupropion] Other (See Comments)     Alteration with hearing and other senses       VTE Pharmacologic Prophylaxis:   Sequential compression device (Venodyne)     Labs:   Troponins:  Results from last 7 days   Lab Units 02/07/22  0133 02/07/22  0041   HSTNI D2 ng/L  --  2   HSTNI D4 ng/L 3  -- CBC with diff:  Results from last 7 days   Lab Units 02/08/22  0521 02/07/22  0528 02/06/22  2216   WBC Thousand/uL 25 95* 21 76* 15 70*   HEMOGLOBIN g/dL 12 9 14 1 12 5   HEMATOCRIT % 37 7 40 8 36 1*   MCV fL 95 93 92   PLATELETS Thousands/uL 171 188 148*   MCH pg 32 3 32 2 32 0   MCHC g/dL 34 2 34 6 34 6   RDW % 13 1 12 8 12 8   MPV fL 9 9 9 0 8 8*   NRBC AUTO /100 WBCs 0 0  --      CMP:  Results from last 7 days   Lab Units 02/08/22  0914 02/07/22  1101 02/06/22  2255 02/06/22  2216   SODIUM mmol/L 130* 128* 129* 125*   POTASSIUM mmol/L 4 6 4 9 4 9 6 3*   CHLORIDE mmol/L 96* 90* 91* 89*   CO2 mmol/L 29 24 26 26   ANION GAP mmol/L 5 14* 12 10   BUN mg/dL 38* 46* 33* 32*   CREATININE mg/dL 1 09 1 51* 1 02 0 91   CALCIUM mg/dL 8 9 10 5* 10 1 9 9   AST U/L  --  27  --  55*   ALT U/L  --  57  --  50   ALK PHOS U/L  --  77  --  103   TOTAL PROTEIN g/dL  --  8 1  --  6 9   ALBUMIN g/dL  --  4 4  --  3 7   TOTAL BILIRUBIN mg/dL  --  0 61  --  0 55   EGFR ml/min/1 73sq m 72 48 78 90     Magnesium:  Results from last 7 days   Lab Units 02/08/22  0521   MAGNESIUM mg/dL 1 5*     Coags:  Results from last 7 days   Lab Units 02/06/22  2216   PTT seconds 30   INR  1 08     NT-proBNP:   Recent Labs     02/06/22  2216   NTBNP 446*        Imaging & Testing   I have personally reviewed pertinent reports  XR chest 1 view portable    Result Date: 2/7/2022  Narrative: CHEST INDICATION:   sob, palpitations  COMPARISON:  10/11/2020 EXAM PERFORMED/VIEWS:  XR CHEST PORTABLE Single view FINDINGS: Development of mild vascular congestion Cardiomediastinal silhouette remains enlarged No pneumothorax or pleural effusion  Osseous structures appear within normal limits for patient age       Impression: Persistent cardiomegaly Development of mild vascular congestion Workstation performed: [id number]     Echo complete w/ contrast if indicated    Result Date: 2/7/2022  Narrative: [facility]  Left Ventricle: Left ventricular cavity size is normal  Wall thickness is mildly increased  The left ventricular ejection fraction is 55%  Systolic function is low normal  Wall motion is normal    Right Ventricle: Systolic function is low normal    Left Atrium: The atrium is severely dilated    Right Atrium: The atrium is moderately dilated    Pulmonic Valve: There is mild regurgitation    Prior TTE study available for comparison  Prior study date: 10/12/2020  No significant changes noted compared to the prior study  EKG / Monitor: Personally reviewed  Atrial fibrillation            Alisha Doherty [place]  Cardiology      "This note has been constructed using a voice recognition system  Therefore there may be syntax, spelling, and/or grammatical errors   Please call if you have any questions  "

## 2022-02-08 NOTE — CASE MANAGEMENT
Case Management Discharge Planning Note    Patient name Anna Schwartz  Location [address] [place] [address] [place] [address]-* MRN [id number]  : 1960 Date 2022       Current Admission Date: 2022  Current Admission Diagnosis:Chronic a-fib [facility])   Patient Active Problem List    Diagnosis Date Noted    Shortness of breath 2022    SIRS (systemic inflammatory response syndrome) ([facility] ) 2022    Dysuria 2021    Peripheral neuropathy 2021    Chronic bilateral low back pain without sciatica 2021    BMI 40 0-44 9, adult ([facility] ) 2021    Muscle weakness (generalized) 2021    Platelets decreased ([place] [address] ) 2021    Medicare annual wellness visit, subsequent 2021    Chronic congestive heart failure ([facility] ) 2020    Leukocytosis 10/29/2020    Hyperlipidemia 10/29/2020    Nutritional anemia, unspecified  10/29/2020    Chest pain 10/11/2020    Dizziness 10/11/2020    Simple chronic bronchitis ([facility] ) 10/11/2020    PAF (paroxysmal atrial fibrillation) ([facility] ) 2020    Hyperglycemia 2020    Transition of care performed with sharing of clinical summary 2020    Obstructive sleep apnea 2020    Obesity (BMI 30-39 9) 2020    Stage 2 chronic kidney disease 2020    Hyponatremia 2020    COPD (chronic obstructive pulmonary disease) ([facility] ) 2020    Chronic a-fib ([facility] ) 2020    H/O vitamin D deficiency 10/28/2019    Dyslipidemia 2019    Type 2 diabetes mellitus with complication, with long-term current use of insulin ([facility] ) 2019    Restrictive ventilatory defect 2019    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 [facility]) 2019    Lung disease, restrictive 2018    Chronic respiratory failure with hypoxia ([facility] ) 10/31/2018    Coronary artery disease involving native heart 2017    Esophageal reflux 2017    Back ache 2016    SHAVONNE and COPD overlap syndrome ([place] [address] )     Morbid obesity ([place] [address] )     Uncontrolled type 2 diabetes mellitus with hyperglycemia ([place] [address] ) 09/02/2016    Fatigue 09/02/2016    Chronic reflux esophagitis 06/08/2016    Cough 01/13/2016    COPD with exacerbation ([place] [address] ) 01/13/2016    Bipolar affective disorder ([place] [address] )     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/25/2015    Erectile dysfunction of organic origin 08/25/2014    Benign essential hypertension 09/04/2012    Diabetic neuropathy ([place] [address] ) 09/04/2012    Panic disorder without agoraphobia 09/04/2012    Vitamin D deficiency 09/04/2012      LOS (days): 1  Geometric Mean LOS (GMLOS) (days): 3 80  Days to GMLOS:3     OBJECTIVE:  Risk of Unplanned Readmission Score: 31   Current admission status: Inpatient   Preferred Pharmacy:   [address] [place], [place] [address]  Phone: [phone] Fax: [phone]    Primary Care Provider: Saintclair Halter, MD    Primary Insurance: [employer] [facility]  Secondary Insurance: Tamanna Dominique [facility]    DISCHARGE DETAILS:  Discharge planning discussed with[de-identified] Patient  Freedom of Choice: Yes  Comments - Freedom of Choice: Community [facility] pending acceptance and home aids through [facility] (22hrs/week)-w/patient LTC insurance benefit; all DME in place  CM contacted family/caregiver?: Yes  Were Treatment Team discharge recommendations reviewed with patient/caregiver?: Yes  Did patient/caregiver verbalize understanding of patient care needs?: Yes  Were patient/caregiver advised of the risks associated with not following Treatment Team discharge recommendations?: Yes    [facility] Name[de-identified] [address] Provider[de-identified] PCP  Home Health Services Needed[de-identified] Strengthening/Theraputic Exercises to Improve Function,Oxygen Via Nasal Cannula,COPD Management,Heart Failure Management,Evaluate Functional Status and Safety  Oxygen LPM Ordered (if applicable based on home health services needed):: 2 LPM  Homebound Criteria Met[de-identified] Requires Medical Transportation,Uses an Assist Device (i e  cane, walker, etc),Requires the Assistance of Another Person for Safe Ambulation or to Leave the Home  Supporting Clincal Findings[de-identified] Requires Oxygen,Fatigues [place] in [address] with Exertion,Limited Endurance    DME Referral Provided  Referral made for DME?: No    Would you like to participate in our [address] service program?  : No - Declined    Treatment Team Recommendation: Home with [facility]  Discharge Destination Plan[de-identified] Home with [place] at Discharge : [facility] ([address] transport)  Dispatcher Contacted: Yes  Number/Name of Dispatcher: [facility] in [facility] by Karlos and Unit #): TBD ([address] requested)  ETA of Transport (Date): 02/08/22     Transfer Mode: Stretcher     IMM Given (Date):: 02/08/22  IMM Given to[de-identified] Patient (Discussed with patient and he states understanding and agrees to dc today )     Chart reviewed and case discussed with Dr Natalie Woodward and in MDR's  And patient is for dc today  CM met with patient and he is aware of discharge  He declines the need for STR at dc  He has been to one in the past and does not feel he will need it this time  He is agreeable to [address] [address] and a referral was sent to [facility] yesterday  He states he will resume services with [facility] which he has 22 hrs/week through his LTC insurance  He states he has all DME in place  He requests transport home today  CM submitted BLS transport to his residence via [address] in [place] -sent to [facility]  Patients nurse Jin Hein is aware of dcp and that transport was requested

## 2022-02-08 NOTE — PLAN OF CARE
Problem: MOBILITY - ADULT  Goal: Maintain or return to baseline ADL function  Description: INTERVENTIONS:  -  Assess patient's ability to carry out ADLs; assess patient's baseline for ADL function and identify physical deficits which impact ability to perform ADLs (bathing, care of mouth/teeth, toileting, grooming, dressing, etc )  - Assess/evaluate cause of self-care deficits   - Assess range of motion  - Assess patient's mobility; develop plan if impaired  - Assess patient's need for assistive devices and provide as appropriate  - Encourage maximum independence but intervene and supervise when necessary  - Involve family in performance of ADLs  - Assess for home care needs following discharge   - Consider OT consult to assist with ADL evaluation and planning for discharge  - Provide patient education as appropriate  Outcome: Progressing     Problem: Potential for Falls  Goal: Patient will remain free of falls  Description: INTERVENTIONS:  - Educate patient/family on patient safety including physical limitations  - Instruct patient to call for assistance with activity   - Consult OT/PT to assist with strengthening/mobility   - Keep Call bell within reach  - Keep bed low and locked with side rails adjusted as appropriate  - Keep care items and personal belongings within reach  - Initiate and maintain comfort rounds  - Make Fall Risk Sign visible to staff  - Offer Toileting every 2 Hours, in advance of need  - Initiate/Maintain bed/chair alarm  - Obtain necessary fall risk management equipment: fall risk sign  - Apply yellow socks and bracelet for high fall risk patients  - Consider moving patient to room near nurses station  Outcome: Progressing     Problem: PAIN - ADULT  Goal: Verbalizes/displays adequate comfort level or baseline comfort level  Description: Interventions:  - Encourage patient to monitor pain and request assistance  - Assess pain using appropriate pain scale  - Administer analgesics based on type and severity of pain and evaluate response  - Implement non-pharmacological measures as appropriate and evaluate response  - Consider cultural and social influences on pain and pain management  - Notify physician/advanced practitioner if interventions unsuccessful or patient reports new pain  Outcome: Progressing     Problem: RESPIRATORY - ADULT  Goal: Achieves optimal ventilation and oxygenation  Description: INTERVENTIONS:  - Assess for changes in respiratory status  - Assess for changes in mentation and behavior  - Position to facilitate oxygenation and minimize respiratory effort  - Oxygen administered by appropriate delivery if ordered  - Initiate smoking cessation education as indicated  - Encourage broncho-pulmonary hygiene including cough, deep breathe, Incentive Spirometry  - Assess the need for suctioning and aspirate as needed  - Assess and instruct to report SOB or any respiratory difficulty  - Respiratory Therapy support as indicated  Outcome: Progressing

## 2022-02-08 NOTE — DISCHARGE SUMMARY
[facility]  Discharge- Terry Garza 1960, 57 y o  male MRN: [id number]  Unit/Bed#: [address] [place] [address] Encounter: [id number]  Primary Care Provider: Mindy Perez MD   Date and time admitted to hospital: 2/6/2022  9:57 PM    No new Assessment & Plan notes have been filed under this hospital service since the last note was generated  Service: Hospitalist      Medical Problems             Resolved Problems  Date Reviewed: 2/7/2022          Resolved    * (Principal) Acute on chronic combined systolic and diastolic heart failure ([facility] ) 2/8/2022     Resolved by  Vanice Goodell, DO              Discharging Physician / Practitioner: Vanice Goodell, DO  PCP: Mindy Perez MD  Admission Date:   Admission Orders (From admission, onward)     Ordered        02/07/22 1524  Inpatient Admission  Once            02/06/22 2350  Place in Observation  Once                      Discharge Date: 02/08/22    Consultations During Hospital Stay:  · Cardiology    Procedures Performed:   · None    Significant Findings / Test Results:   ECHO: The left ventricular ejection fraction is 55%  Systolic function is low normal  Unable to assess diastolic function due to atrial fibrillation  No significant changes noted compared to the prior study  CXR: Persistent cardiomegaly  Development of mild vascular congestion    Incidental Findings:   · None    Test Results Pending at Discharge (will require follow up): · None      Outpatient Tests Requested:  · None    Complications:  None    Reason for Admission: Afib with RVR     Hospital Course:   Terry Garza is a 57 y o  male patient who originally presented to the hospital on 2/6/2022 due to shortness of breath  In ED patient was found to be in AFib with RVR was treated with IV Lopressor  Vascular congestion noted on the chest x-ray patient was given IV Lasix  Cardiology was consulted    Echocardiogram was done which showed low normal systolic function, no significant change since previous ECHO in October 2020  ECHO from February 2020 did show mildly to moderately reduced systolic function with diastolic dysfunction  Patient developed acute kidney injury possibly due to diuresis and so Lasix was held  Repeat labs show resolution of acute kidney injury  Please see above list of diagnoses and related plan for additional information  Condition at Discharge: fair    Discharge Day Visit / Exam:   Subjective:  Patient reports symptomatic improvement   Vitals: Blood Pressure: 116/58 (02/08/22 1139)  Pulse: 75 (02/08/22 1139)  Temperature: (!) 97 3 °F (36 3 °C) (02/08/22 0806)  Temp Source: Oral (02/07/22 0740)  Respirations: 18 (02/08/22 0807)  Height: 5' 11" (180 3 cm) (02/07/22 1024)  Weight - Scale: (!) 137 kg (301 lb) (02/07/22 1024)  SpO2: 97 % (02/08/22 1139)  Exam:   Physical Exam  HENT:      Head: Normocephalic and atraumatic  Mouth/Throat:      Mouth: Mucous membranes are moist    Eyes:      Extraocular Movements: Extraocular movements intact  Cardiovascular:      Rate and Rhythm: Normal rate  Pulmonary:      Effort: Pulmonary effort is normal       Breath sounds: Normal breath sounds  Abdominal:      General: Abdomen is flat  Palpations: Abdomen is soft  Tenderness: There is no abdominal tenderness  Skin:     General: Skin is warm and dry  Neurological:      Mental Status: He is alert  Mental status is at baseline  Discharge instructions/Information to patient and family:   See after visit summary for information provided to patient and family  Provisions for Follow-Up Care:  See after visit summary for information related to follow-up care and any pertinent home health orders  Disposition:   Home    Planned Readmission: none     Discharge Statement:  I spent greater than 30 minutes discharging the patient  This time was spent on the day of discharge   I had direct contact with the patient on the day of discharge  Greater than 50% of the total time was spent examining patient, answering all patient questions, arranging and discussing plan of care with patient as well as directly providing post-discharge instructions  Additional time then spent on discharge activities  Discharge Medications:  See after visit summary for reconciled discharge medications provided to patient and/or family        **Please Note: This note may have been constructed using a voice recognition system**

## 2022-02-08 NOTE — NURSING NOTE
Patient's IV removed  Patient left with all belongings  AVS reviewed with patient  Patient verbalized understanding  Patient left via wheelchair with transport

## 2022-02-08 NOTE — DISCHARGE INSTRUCTIONS
A-fib (Atrial Fibrillation)   WHAT YOU NEED TO KNOW:   A-fib may come and go, or it may be a long-term condition  A-fib can cause blood clots, stroke, or heart failure  These conditions may become life-threatening  It is important to treat and manage A-fib to help prevent a blood clot, stroke, or heart failure  DISCHARGE INSTRUCTIONS:   Call your local emergency number (911 in the [facility] [facility] [facility]) or have someone call if:   · You have any of the following signs of a heart attack:      ? Squeezing, pressure, or pain in your chest    ? You may  also have any of the following:     § Discomfort or pain in your back, neck, jaw, stomach, or arm    § Shortness of breath    § Nausea or vomiting    § Lightheadedness or a sudden cold sweat    · You have any of the following signs of a stroke:      ? Numbness or drooping on one side of your face     ? Weakness in an arm or leg    ? Confusion or difficulty speaking    ? Dizziness, a severe headache, or vision loss    Call your doctor or cardiologist if:   · Your arm or leg feels warm, tender, and painful  It may look swollen and red  · Your heart rate is more than 110 beats per minute  · You have new or worsening swelling in your legs, feet, ankles, or abdomen  · You are short of breath, even at rest     · You have questions or concerns about your condition or care  Medicines: You may need any of the following:  · Heart medicines  help control your heart rate or rhythm  You may need more than one medicine to treat your symptoms  · Blood thinners  help prevent blood clots  Clots can cause strokes, heart attacks, and death  The following are general safety guidelines to follow while you are taking a blood thinner:    ? Watch for bleeding and bruising while you take blood thinners  Watch for bleeding from your gums or nose  Watch for blood in your urine and bowel movements  Use a soft washcloth on your skin, and a soft toothbrush to brush your teeth   This can keep your skin and gums from bleeding  If you shave, use an electric shaver  Do not play contact sports  ? Tell your dentist and other healthcare providers that you take a blood thinner  Wear a bracelet or necklace that says you take this medicine  ? Do not start or stop any other medicines unless your healthcare provider tells you to  Many medicines cannot be used with blood thinners  ? Take your blood thinner exactly as prescribed by your healthcare provider  Do not skip does or take less than prescribed  Tell your provider right away if you forget to take your blood thinner, or if you take too much  ? Warfarin  is a blood thinner that you may need to take  The following are things you should be aware of if you take warfarin:     § Foods and medicines can affect the amount of warfarin in your blood  Do not make major changes to your diet while you take warfarin  Warfarin works best when you eat about the same amount of vitamin K every day  Vitamin K is found in green leafy vegetables and certain other foods  Ask for more information about what to eat when you are taking warfarin  § You will need to see your healthcare provider for follow-up visits when you are on warfarin  You will need regular blood tests  These tests are used to decide how much medicine you need  · Antiplatelets , such as aspirin, help prevent blood clots  Take your antiplatelet medicine exactly as directed  These medicines make it more likely for you to bleed or bruise  If you are told to take aspirin, do not take acetaminophen or ibuprofen instead  · Take your medicine as directed  Contact your healthcare provider if you think your medicine is not helping or if you have side effects  Tell him or her if you are allergic to any medicine  Keep a list of the medicines, vitamins, and herbs you take  Include the amounts, and when and why you take them  Bring the list or the pill bottles to follow-up visits   Carry your medicine list with you in case of an emergency  Manage A-fib:   · Know your target heart rate  Learn how to check your pulse and monitor your heart rate  · Know the risks if you choose to drink alcohol  Alcohol can increase your risk for A-fib or make A-fib harder to manage  Ask your healthcare provider if it is okay for you to drink any alcohol  He or she can help you set limits for the number of drinks you have in 24 hours and in a week  A drink of alcohol is 12 ounces of beer, 5 ounces of wine, or 1½ ounces of liquor  · Do not smoke  Nicotine can cause heart damage and make it more difficult to manage your A-fib  Do not use e-cigarettes or smokeless tobacco in place of cigarettes or to help you quit  They still contain nicotine  Ask your healthcare provider for information if you currently smoke and need help quitting  · Eat heart-healthy foods  Heart healthy foods will help keep your cholesterol low  These include fruits, vegetables, whole-grain breads, low-fat dairy products, beans, lean meats, and fish  Replace butter and margarine with heart-healthy oils such as olive oil and canola oil  · Maintain a healthy weight  Ask your healthcare provider what a healthy weight is for you  Ask him or her to help you create a safe weight loss plan if you are overweight  Even a small goal of a 10% weight loss can improve your heart health  · Get regular physical activity  Physical activity helps improve your heart health  Get at least 150 minutes of moderate aerobic physical activity each week  Your healthcare provider can help you create an activity plan  · Manage other health conditions  This includes high blood pressure or cholesterol, sleep apnea, diabetes, and other heart conditions  Take medicine as directed and follow your treatment plan  Your healthcare provider may need to change a medicine you are taking if it is causing your A-fib   Do not  stop taking any medicine unless directed by your provider  Follow up with your doctor or cardiologist as directed: You will need regular blood tests and monitoring  Write down your questions so you remember to ask them during your visits  © Copyright [employer] 2021 Information is for End User's use only and may not be sold, redistributed or otherwise used for commercial purposes  All illustrations and images included in CareNotes® are the copyrighted property of A D A M , Inc  or [facility]yles   The above information is an  only  It is not intended as medical advice for individual conditions or treatments  Talk to your doctor, nurse or pharmacist before following any medical regimen to see if it is safe and effective for you

## 2022-02-09 ENCOUNTER — TRANSITIONAL CARE MANAGEMENT (OUTPATIENT)
Dept: FAMILY MEDICINE CLINIC | Facility: CLINIC | Age: 62
End: 2022-02-09

## 2022-02-09 DIAGNOSIS — E78.5 DYSLIPIDEMIA: ICD-10-CM

## 2022-02-09 NOTE — UTILIZATION REVIEW
Notification of Discharge   This is a Notification of Discharge from our facility [address]  Please be advised that this patient has been discharge from our facility  Below you will find the admission and discharge date and time including the patients disposition  UTILIZATION REVIEW CONTACT:  Phan Schroeder  Utilization   Network Utilization Review Department  Phone: [phone] x carefully listen to the prompts  All voicemails are confidential   Email: [email or web address]     PHYSICIAN ADVISORY SERVICES:  FOR [id number] REVIEW - MEDICAL NECESSITY DENIAL  Phone: [phone]  Fax: [phone]  Email: [email or web address]     PRESENTATION DATE: 2/6/2022  9:57 PM  OBERVATION ADMISSION DATE:   INPATIENT ADMISSION DATE: 2/7/22  3:24 PM   DISCHARGE DATE: 2/8/2022  2:24 PM  DISPOSITION: Home/Self Care Home/Self Care      IMPORTANT INFORMATION:  Send all requests for admission clinical reviews, approved or denied determinations and any other requests to dedicated fax number below belonging to the campus where the patient is receiving treatment   List of dedicated fax numbers:  [address] DENIALS (Administrative/Medical Necessity) [phone]   1000  [facility] (Maternity/NICU/Pediatrics) [phone]   Nicholas Marti [phone]   130 [facility] [phone]   [address] [place] [address] [phone]   2000 [facility] [address] [place] [address],[address] [phone]   [facility]  [phone]   [address] [facility] [address]  2401 [facility] 1000 [facility] [phone]

## 2022-02-10 ENCOUNTER — TELEPHONE (OUTPATIENT)
Dept: FAMILY MEDICINE CLINIC | Facility: CLINIC | Age: 62
End: 2022-02-10

## 2022-02-10 NOTE — TELEPHONE ENCOUNTER
Pharmacy calling to verify which dose of Gabapentin pt is currently on, 100mg or 300mg     Please advise

## 2022-02-11 RX ORDER — ICOSAPENT ETHYL 1000 MG/1
CAPSULE ORAL
Qty: 360 CAPSULE | Refills: 3 | Status: SHIPPED | OUTPATIENT
Start: 2022-02-11 | End: 2022-07-05 | Stop reason: SDUPTHER

## 2022-02-12 ENCOUNTER — TELEPHONE (OUTPATIENT)
Dept: FAMILY MEDICINE CLINIC | Facility: CLINIC | Age: 62
End: 2022-02-12

## 2022-02-14 DIAGNOSIS — E11.8 TYPE 2 DIABETES MELLITUS WITH COMPLICATION, WITH LONG-TERM CURRENT USE OF INSULIN (HCC): ICD-10-CM

## 2022-02-14 DIAGNOSIS — Z79.4 TYPE 2 DIABETES MELLITUS WITH COMPLICATION, WITH LONG-TERM CURRENT USE OF INSULIN (HCC): ICD-10-CM

## 2022-02-14 DIAGNOSIS — G62.9 PERIPHERAL NEUROPATHY: ICD-10-CM

## 2022-02-14 RX ORDER — LIRAGLUTIDE 6 MG/ML
INJECTION SUBCUTANEOUS
Qty: 9 ML | Refills: 1 | Status: SHIPPED | OUTPATIENT
Start: 2022-02-14 | End: 2022-03-09

## 2022-02-14 RX ORDER — GABAPENTIN 300 MG/1
300 CAPSULE ORAL 2 TIMES DAILY
Qty: 60 CAPSULE | Refills: 1 | Status: SHIPPED | OUTPATIENT
Start: 2022-02-14 | End: 2022-03-24 | Stop reason: ALTCHOICE

## 2022-02-17 ENCOUNTER — EPISODE CHANGES (OUTPATIENT)
Dept: CASE MANAGEMENT | Facility: OTHER | Age: 62
End: 2022-02-17

## 2022-02-17 NOTE — UTILIZATION REVIEW
Notification of Discharge   This is a Notification of Discharge from our facility [facility]  Please be advised that this patient has been discharge from our facility  Below you will find the admission and discharge date and time including the patients disposition  UTILIZATION REVIEW CONTACT:  Phan Schroeder  Utilization   Network Utilization Review Department  Phone: [phone] x carefully listen to the prompts  All voicemails are confidential   Email: [email or web address]     PHYSICIAN ADVISORY SERVICES:  FOR [id number] REVIEW - MEDICAL NECESSITY DENIAL  Phone: [phone]  Fax: [phone]  Email: [email or web address]     PRESENTATION DATE: 2/6/2022  9:57 PM  OBERVATION ADMISSION DATE:   INPATIENT ADMISSION DATE: 2/7/22  3:24 PM   DISCHARGE DATE: 2/8/2022  2:24 PM  DISPOSITION: Home with [place] with [address] [place] [address] Road INFORMATION:  Send all requests for admission clinical reviews, approved or denied determinations and any other requests to dedicated fax number below belonging to the campus where the patient is receiving treatment   List of dedicated fax numbers:  [address] [place] [address] DENIALS (Administrative/Medical Necessity) [phone]   [address] (Maternity/NICU/Pediatrics) [phone]   [place] [phone]   [address] [facility] [address] [phone]   [address] [place] [address] [phone]   2000 [facility] [address] [place] [address],[address]Th Floor [address] [phone]   [facility]  [phone]   [address] [place], [place] [address] [facility] 1000 [facility] [phone]

## 2022-02-18 ENCOUNTER — TELEPHONE (OUTPATIENT)
Dept: FAMILY MEDICINE CLINIC | Facility: CLINIC | Age: 62
End: 2022-02-18

## 2022-02-18 ENCOUNTER — PATIENT OUTREACH (OUTPATIENT)
Dept: CASE MANAGEMENT | Facility: HOSPITAL | Age: 62
End: 2022-02-18

## 2022-02-18 NOTE — TELEPHONE ENCOUNTER
The outpatient manager nurse (José Luis) called requesting an amb homecare referral  José Luis noticed that he was seen by you on 01/11/2022 She stated that the virtual you had with him is still good and community a still has an order all that needs to be done is calling [facility] giving them an order
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
## 2022-02-18 NOTE — PROGRESS NOTES
Heart Failure Outpatient Care Coordinator Note: IB referral received and chart review completed  Call made to patient, but phone message states he is having problems with his phone- unable to leave message  Call made to [facility] for update and left message with  for RN Kiran Brock to call back  Kiran Brock returned call and states they did not get a referral for home care and requested PCP send ambulatory referral She also prefers if patient is seen or has a virtual visit prior to referral, but is willing to accept the PCP visit from 1/11/22 and then schedule follow up afterwards  Call made to PCP office who will send message to Dr Jesse Patton  I will follow up next week

## 2022-02-21 ENCOUNTER — TELEPHONE (OUTPATIENT)
Dept: PAIN MEDICINE | Facility: CLINIC | Age: 62
End: 2022-02-21

## 2022-02-21 ENCOUNTER — TELEPHONE (OUTPATIENT)
Dept: OBGYN CLINIC | Facility: HOSPITAL | Age: 62
End: 2022-02-21

## 2022-02-21 DIAGNOSIS — Z79.4 TYPE 2 DIABETES MELLITUS WITH COMPLICATION, WITH LONG-TERM CURRENT USE OF INSULIN (HCC): ICD-10-CM

## 2022-02-21 DIAGNOSIS — E11.8 TYPE 2 DIABETES MELLITUS WITH COMPLICATION, WITH LONG-TERM CURRENT USE OF INSULIN (HCC): ICD-10-CM

## 2022-02-21 RX ORDER — LANCETS
EACH MISCELLANEOUS
Qty: 300 EACH | Refills: 3 | Status: SHIPPED | OUTPATIENT
Start: 2022-02-21

## 2022-02-21 NOTE — TELEPHONE ENCOUNTER
Emailed MAYNOR for a ride for patient, response below -     " This patient would be using the [address] is for ambulatory patients only, they can no accommodate wheelchairs  He would need to find other means of transportation "    Called patient to inform him, mailbox full  Will try to call back 2/28   TY

## 2022-02-21 NOTE — TELEPHONE ENCOUNTER
Patient called and cannot wait until this afternoon for his lancets and voltaren gel to be sent to the pharmacy  [facility]

## 2022-02-21 NOTE — TELEPHONE ENCOUNTER
Patient is calling for:    [facility]       (dept)  Patient was given phone number: [phone]  Patient was then transferred to the above phone number

## 2022-02-22 ENCOUNTER — TELEPHONE (OUTPATIENT)
Dept: PULMONOLOGY | Facility: MEDICAL CENTER | Age: 62
End: 2022-02-22

## 2022-02-22 DIAGNOSIS — I48.91 ATRIAL FIBRILLATION WITH RVR (HCC): ICD-10-CM

## 2022-02-22 NOTE — TELEPHONE ENCOUNTER
Attempted to contact patient to reschedule missed appointment with Dr Molina Aaron  Mailbox is full    No show letter mailed

## 2022-02-23 ENCOUNTER — TELEPHONE (OUTPATIENT)
Dept: PULMONOLOGY | Facility: CLINIC | Age: 62
End: 2022-02-23

## 2022-02-23 NOTE — TELEPHONE ENCOUNTER
Patient has had multiple sick calls and he needs an appt  See if he can come in since he doesn't have a phone right now?

## 2022-02-23 NOTE — TELEPHONE ENCOUNTER
Patient called stating he still has a cough and is very short of breath and had to raise his oxygen to 4 liters  I explained to him he was a no show to his appt yesterday so we would need to reschedule  He then stated "I don't have a phone right now I have one but it doesn't work right"  I asked him again if he wanted to reschedule his appt  He raised his voice with me and stated "No what don't you understand I don't have a phone I just wanted to tell him what's going on" I stated I understood he was having issues with his phone but asked him if he could come to an appt without his phone  He stated "just forget it bye" and hung up

## 2022-02-25 ENCOUNTER — PATIENT OUTREACH (OUTPATIENT)
Dept: CASE MANAGEMENT | Facility: HOSPITAL | Age: 62
End: 2022-02-25

## 2022-02-25 NOTE — PROGRESS NOTES
Heart Failure Outpatient Care Coordinator Note: Call made to patient and unable to leave message  Chart notes reviewed and patient has not followed up with any providers, though has used My Chart to request prescription refills  Call made to [facility] and left message  Received call back from liaisonMaryan with [facility] who states will look into referral as not in their system and LMK  Will send unable to reach letter to patient

## 2022-02-28 DIAGNOSIS — Z79.4 TYPE 2 DIABETES MELLITUS WITH COMPLICATION, WITH LONG-TERM CURRENT USE OF INSULIN (HCC): ICD-10-CM

## 2022-02-28 DIAGNOSIS — E11.8 TYPE 2 DIABETES MELLITUS WITH COMPLICATION, WITH LONG-TERM CURRENT USE OF INSULIN (HCC): ICD-10-CM

## 2022-02-28 RX ORDER — APIXABAN 5 MG/1
TABLET, FILM COATED ORAL
Qty: 180 TABLET | Refills: 3 | Status: SHIPPED | OUTPATIENT
Start: 2022-02-28

## 2022-03-01 ENCOUNTER — PATIENT OUTREACH (OUTPATIENT)
Dept: CASE MANAGEMENT | Facility: HOSPITAL | Age: 62
End: 2022-03-01

## 2022-03-01 NOTE — LETTER
Date: 03/01/22    Dear Riaz Zaman  My name is Terri Real  I am a registered nurse care manager working with [employer] [facility] [employer]  My work is to help patients that have been in the hospital with a primary diagnosis of heart failure help manage their condition and get the care they need  I have been unable to talk with you but I would like to send you some information you may find useful  I have enclosed a one page  Heart Failure Zone Tool, a weight log  and some tip sheets for eating a low sodium diet  I also am sending a link for a short animated video  [email or web address]    Please call me with any questions you may have     Sincerely,  Terri Dixon RN,CNL  [phone]  Heart Failure Outpatient Care Manager    [facility]  [address] [place] [address] [place]  [place], [address]

## 2022-03-01 NOTE — PROGRESS NOTES
Heart Failure Outpatient Care Coordinator Note: Unable to reach patient letter sent along with HF Zone tool, low sodium diet tip sheet and information on the HF Support group

## 2022-03-07 RX ORDER — BLOOD-GLUCOSE METER
EACH MISCELLANEOUS DAILY
Qty: 1 KIT | Refills: 0 | Status: SHIPPED | OUTPATIENT
Start: 2022-03-07 | End: 2022-05-24 | Stop reason: HOSPADM

## 2022-03-09 DIAGNOSIS — Z79.4 TYPE 2 DIABETES MELLITUS WITH COMPLICATION, WITH LONG-TERM CURRENT USE OF INSULIN (HCC): ICD-10-CM

## 2022-03-09 DIAGNOSIS — I50.22 CHRONIC SYSTOLIC HEART FAILURE (HCC): ICD-10-CM

## 2022-03-09 DIAGNOSIS — E11.8 TYPE 2 DIABETES MELLITUS WITH COMPLICATION, WITH LONG-TERM CURRENT USE OF INSULIN (HCC): ICD-10-CM

## 2022-03-09 RX ORDER — LIRAGLUTIDE 6 MG/ML
INJECTION SUBCUTANEOUS
Qty: 9 ML | Refills: 1 | Status: ON HOLD | OUTPATIENT
Start: 2022-03-09 | End: 2022-04-21

## 2022-03-10 RX ORDER — SPIRONOLACTONE 25 MG/1
TABLET ORAL
Qty: 30 TABLET | Refills: 2 | Status: SHIPPED | OUTPATIENT
Start: 2022-03-10 | End: 2022-04-28 | Stop reason: HOSPADM

## 2022-03-14 DIAGNOSIS — I25.10 CORONARY ARTERY DISEASE INVOLVING NATIVE HEART WITHOUT ANGINA PECTORIS, UNSPECIFIED VESSEL OR LESION TYPE: ICD-10-CM

## 2022-03-14 RX ORDER — METOPROLOL SUCCINATE 200 MG/1
TABLET, EXTENDED RELEASE ORAL
Qty: 90 TABLET | Refills: 3 | Status: SHIPPED | OUTPATIENT
Start: 2022-03-14 | End: 2022-05-17

## 2022-03-22 ENCOUNTER — TELEPHONE (OUTPATIENT)
Dept: OBGYN CLINIC | Facility: CLINIC | Age: 62
End: 2022-03-22

## 2022-03-22 ENCOUNTER — TELEPHONE (OUTPATIENT)
Dept: PAIN MEDICINE | Facility: CLINIC | Age: 62
End: 2022-03-22

## 2022-03-22 ENCOUNTER — TELEPHONE (OUTPATIENT)
Dept: OBGYN CLINIC | Facility: HOSPITAL | Age: 62
End: 2022-03-22

## 2022-03-22 NOTE — TELEPHONE ENCOUNTER
Patient called [facility], was informed that Desi would pick him up on 3/24/22 at 7:15 am at the address on file  Patient said his phone [phone] will be working as of tomorrow   TY

## 2022-03-24 ENCOUNTER — HOSPITAL ENCOUNTER (INPATIENT)
Facility: HOSPITAL | Age: 62
LOS: 5 days | Discharge: HOME | DRG: 190 | End: 2022-03-29
Attending: EMERGENCY MEDICINE | Admitting: INTERNAL MEDICINE
Payer: COMMERCIAL

## 2022-03-24 ENCOUNTER — APPOINTMENT (EMERGENCY)
Dept: RADIOLOGY | Facility: HOSPITAL | Age: 62
DRG: 190 | End: 2022-03-24
Payer: COMMERCIAL

## 2022-03-24 ENCOUNTER — CONSULT (OUTPATIENT)
Dept: PAIN MEDICINE | Facility: CLINIC | Age: 62
End: 2022-03-24
Payer: MEDICARE

## 2022-03-24 ENCOUNTER — TELEPHONE (OUTPATIENT)
Dept: PAIN MEDICINE | Facility: CLINIC | Age: 62
End: 2022-03-24

## 2022-03-24 VITALS — HEART RATE: 88 BPM | SYSTOLIC BLOOD PRESSURE: 104 MMHG | DIASTOLIC BLOOD PRESSURE: 70 MMHG

## 2022-03-24 DIAGNOSIS — M54.42 CHRONIC LEFT-SIDED LOW BACK PAIN WITH LEFT-SIDED SCIATICA: ICD-10-CM

## 2022-03-24 DIAGNOSIS — M54.16 LUMBAR RADICULOPATHY: ICD-10-CM

## 2022-03-24 DIAGNOSIS — I50.9 CHRONIC CONGESTIVE HEART FAILURE, UNSPECIFIED HEART FAILURE TYPE (HCC): ICD-10-CM

## 2022-03-24 DIAGNOSIS — M54.9 BACK PAIN WITH RADIATION: ICD-10-CM

## 2022-03-24 DIAGNOSIS — G89.29 CHRONIC LEFT-SIDED LOW BACK PAIN WITH LEFT-SIDED SCIATICA: ICD-10-CM

## 2022-03-24 DIAGNOSIS — Z79.4 TYPE 2 DIABETES MELLITUS WITH COMPLICATION, WITH LONG-TERM CURRENT USE OF INSULIN (HCC): ICD-10-CM

## 2022-03-24 DIAGNOSIS — M96.1 LUMBAR POST-LAMINECTOMY SYNDROME: ICD-10-CM

## 2022-03-24 DIAGNOSIS — E11.8 TYPE 2 DIABETES MELLITUS WITH COMPLICATION, WITH LONG-TERM CURRENT USE OF INSULIN (HCC): ICD-10-CM

## 2022-03-24 DIAGNOSIS — M48.061 FORAMINAL STENOSIS OF LUMBAR REGION: ICD-10-CM

## 2022-03-24 DIAGNOSIS — J44.9 CHRONIC OBSTRUCTIVE PULMONARY DISEASE, UNSPECIFIED COPD TYPE (HCC): ICD-10-CM

## 2022-03-24 DIAGNOSIS — G89.4 CHRONIC PAIN SYNDROME: Primary | ICD-10-CM

## 2022-03-24 DIAGNOSIS — J44.1 COPD EXACERBATION (HCC): Primary | ICD-10-CM

## 2022-03-24 PROBLEM — G93.40 ENCEPHALOPATHY: Status: ACTIVE | Noted: 2022-03-24

## 2022-03-24 LAB
2HR DELTA HS TROPONIN: 2 NG/L
4HR DELTA HS TROPONIN: 1 NG/L
ALBUMIN SERPL BCP-MCNC: 3.9 G/DL (ref 3.5–5)
ALP SERPL-CCNC: 87 U/L (ref 46–116)
ALT SERPL W P-5'-P-CCNC: 45 U/L (ref 12–78)
ANION GAP SERPL CALCULATED.3IONS-SCNC: 10 MMOL/L (ref 4–13)
ARTERIAL PATENCY WRIST A: YES
AST SERPL W P-5'-P-CCNC: 26 U/L (ref 5–45)
BASE EXCESS BLDA CALC-SCNC: -0.4 MMOL/L
BASOPHILS # BLD MANUAL: 0 THOUSAND/UL (ref 0–0.1)
BASOPHILS NFR MAR MANUAL: 0 % (ref 0–1)
BILIRUB SERPL-MCNC: 0.4 MG/DL (ref 0.2–1)
BODY TEMPERATURE: 97.6 DEGREES FEHRENHEIT
BUN SERPL-MCNC: 29 MG/DL (ref 5–25)
CALCIUM SERPL-MCNC: 8.8 MG/DL (ref 8.3–10.1)
CARDIAC TROPONIN I PNL SERPL HS: 12 NG/L
CARDIAC TROPONIN I PNL SERPL HS: 13 NG/L
CARDIAC TROPONIN I PNL SERPL HS: 14 NG/L
CHLORIDE SERPL-SCNC: 99 MMOL/L (ref 100–108)
CO2 SERPL-SCNC: 26 MMOL/L (ref 21–32)
CREAT SERPL-MCNC: 1.07 MG/DL (ref 0.6–1.3)
DIGOXIN SERPL-MCNC: 0.6 NG/ML (ref 0.8–2)
EOSINOPHIL # BLD MANUAL: 0 THOUSAND/UL (ref 0–0.4)
EOSINOPHIL NFR BLD MANUAL: 0 % (ref 0–6)
ERYTHROCYTE [DISTWIDTH] IN BLOOD BY AUTOMATED COUNT: 12.6 % (ref 11.6–15.1)
FLUAV RNA RESP QL NAA+PROBE: NEGATIVE
FLUBV RNA RESP QL NAA+PROBE: NEGATIVE
GFR SERPL CREATININE-BSD FRML MDRD: 73 ML/MIN/1.73SQ M
GLUCOSE SERPL-MCNC: 141 MG/DL (ref 65–140)
GLUCOSE SERPL-MCNC: 209 MG/DL (ref 65–140)
GLUCOSE SERPL-MCNC: 475 MG/DL (ref 65–140)
GLUCOSE SERPL-MCNC: 538 MG/DL (ref 65–140)
GLUCOSE SERPL-MCNC: >500 MG/DL (ref 65–140)
HCO3 BLDA-SCNC: 23.9 MMOL/L (ref 22–28)
HCT VFR BLD AUTO: 41.5 % (ref 36.5–49.3)
HGB BLD-MCNC: 14.3 G/DL (ref 12–17)
LYMPHOCYTES # BLD AUTO: 30 % (ref 14–44)
LYMPHOCYTES # BLD AUTO: 4.53 THOUSAND/UL (ref 0.6–4.47)
MCH RBC QN AUTO: 31.6 PG (ref 26.8–34.3)
MCHC RBC AUTO-ENTMCNC: 34.5 G/DL (ref 31.4–37.4)
MCV RBC AUTO: 92 FL (ref 82–98)
MONOCYTES # BLD AUTO: 0.6 THOUSAND/UL (ref 0–1.22)
MONOCYTES NFR BLD: 4 % (ref 4–12)
NASAL CANNULA: 3.5
NEUTROPHILS # BLD MANUAL: 8.46 THOUSAND/UL (ref 1.85–7.62)
NEUTS SEG NFR BLD AUTO: 56 % (ref 43–75)
NT-PROBNP SERPL-MCNC: 183 PG/ML
O2 CT BLDA-SCNC: 19.7 ML/DL (ref 16–23)
OXYHGB MFR BLDA: 97.2 % (ref 94–97)
PCO2 BLDA: 38.3 MM HG (ref 36–44)
PCO2 TEMP ADJ BLDA: 37.3 MM HG (ref 36–44)
PH BLD: 7.42 [PH] (ref 7.35–7.45)
PH BLDA: 7.41 [PH] (ref 7.35–7.45)
PLATELET # BLD AUTO: 187 THOUSANDS/UL (ref 149–390)
PLATELET BLD QL SMEAR: ADEQUATE
PMV BLD AUTO: 9.5 FL (ref 8.9–12.7)
PO2 BLD: 128.3 MM HG (ref 75–129)
PO2 BLDA: 132 MM HG (ref 75–129)
POTASSIUM SERPL-SCNC: 3.8 MMOL/L (ref 3.5–5.3)
PROCALCITONIN SERPL-MCNC: 0.05 NG/ML
PROT SERPL-MCNC: 7 G/DL (ref 6.4–8.2)
RBC # BLD AUTO: 4.53 MILLION/UL (ref 3.88–5.62)
RBC MORPH BLD: NORMAL
RSV RNA RESP QL NAA+PROBE: NEGATIVE
SARS-COV-2 RNA RESP QL NAA+PROBE: NEGATIVE
SODIUM SERPL-SCNC: 135 MMOL/L (ref 136–145)
SPECIMEN SOURCE: ABNORMAL
VARIANT LYMPHS # BLD AUTO: 10 %
WBC # BLD AUTO: 15.11 THOUSAND/UL (ref 4.31–10.16)

## 2022-03-24 PROCEDURE — 94640 AIRWAY INHALATION TREATMENT: CPT

## 2022-03-24 PROCEDURE — 80162 ASSAY OF DIGOXIN TOTAL: CPT | Performed by: INTERNAL MEDICINE

## 2022-03-24 PROCEDURE — 84145 PROCALCITONIN (PCT): CPT | Performed by: INTERNAL MEDICINE

## 2022-03-24 PROCEDURE — 36415 COLL VENOUS BLD VENIPUNCTURE: CPT | Performed by: EMERGENCY MEDICINE

## 2022-03-24 PROCEDURE — 96374 THER/PROPH/DIAG INJ IV PUSH: CPT

## 2022-03-24 PROCEDURE — 94760 N-INVAS EAR/PLS OXIMETRY 1: CPT

## 2022-03-24 PROCEDURE — 99285 EMERGENCY DEPT VISIT HI MDM: CPT

## 2022-03-24 PROCEDURE — 82947 ASSAY GLUCOSE BLOOD QUANT: CPT | Performed by: INTERNAL MEDICINE

## 2022-03-24 PROCEDURE — 99285 EMERGENCY DEPT VISIT HI MDM: CPT | Performed by: EMERGENCY MEDICINE

## 2022-03-24 PROCEDURE — 85007 BL SMEAR W/DIFF WBC COUNT: CPT | Performed by: EMERGENCY MEDICINE

## 2022-03-24 PROCEDURE — 94660 CPAP INITIATION&MGMT: CPT

## 2022-03-24 PROCEDURE — 70450 CT HEAD/BRAIN W/O DYE: CPT

## 2022-03-24 PROCEDURE — 82948 REAGENT STRIP/BLOOD GLUCOSE: CPT

## 2022-03-24 PROCEDURE — G1004 CDSM NDSC: HCPCS

## 2022-03-24 PROCEDURE — 0241U HB NFCT DS VIR RESP RNA 4 TRGT: CPT | Performed by: EMERGENCY MEDICINE

## 2022-03-24 PROCEDURE — 84484 ASSAY OF TROPONIN QUANT: CPT | Performed by: EMERGENCY MEDICINE

## 2022-03-24 PROCEDURE — 93005 ELECTROCARDIOGRAM TRACING: CPT

## 2022-03-24 PROCEDURE — 99223 1ST HOSP IP/OBS HIGH 75: CPT | Performed by: INTERNAL MEDICINE

## 2022-03-24 PROCEDURE — 83880 ASSAY OF NATRIURETIC PEPTIDE: CPT | Performed by: EMERGENCY MEDICINE

## 2022-03-24 PROCEDURE — 99204 OFFICE O/P NEW MOD 45 MIN: CPT | Performed by: ANESTHESIOLOGY

## 2022-03-24 PROCEDURE — 71045 X-RAY EXAM CHEST 1 VIEW: CPT

## 2022-03-24 PROCEDURE — 82805 BLOOD GASES W/O2 SATURATION: CPT | Performed by: EMERGENCY MEDICINE

## 2022-03-24 PROCEDURE — 80053 COMPREHEN METABOLIC PANEL: CPT | Performed by: EMERGENCY MEDICINE

## 2022-03-24 PROCEDURE — 36600 WITHDRAWAL OF ARTERIAL BLOOD: CPT

## 2022-03-24 PROCEDURE — 85027 COMPLETE CBC AUTOMATED: CPT | Performed by: EMERGENCY MEDICINE

## 2022-03-24 RX ORDER — BUSPIRONE HYDROCHLORIDE 10 MG/1
10 TABLET ORAL 2 TIMES DAILY
Status: DISCONTINUED | OUTPATIENT
Start: 2022-03-24 | End: 2022-03-29 | Stop reason: HOSPADM

## 2022-03-24 RX ORDER — FLUTICASONE FUROATE AND VILANTEROL 100; 25 UG/1; UG/1
1 POWDER RESPIRATORY (INHALATION) DAILY
Status: DISCONTINUED | OUTPATIENT
Start: 2022-03-25 | End: 2022-03-29 | Stop reason: HOSPADM

## 2022-03-24 RX ORDER — IPRATROPIUM BROMIDE AND ALBUTEROL SULFATE 2.5; .5 MG/3ML; MG/3ML
3 SOLUTION RESPIRATORY (INHALATION)
Status: DISCONTINUED | OUTPATIENT
Start: 2022-03-24 | End: 2022-03-29 | Stop reason: HOSPADM

## 2022-03-24 RX ORDER — OMEGA-3-ACID ETHYL ESTERS 1 G/1
2 CAPSULE, LIQUID FILLED ORAL 2 TIMES DAILY
Status: DISCONTINUED | OUTPATIENT
Start: 2022-03-24 | End: 2022-03-24 | Stop reason: SDUPTHER

## 2022-03-24 RX ORDER — ACETAMINOPHEN 325 MG/1
650 TABLET ORAL EVERY 4 HOURS PRN
Status: DISCONTINUED | OUTPATIENT
Start: 2022-03-24 | End: 2022-03-29 | Stop reason: HOSPADM

## 2022-03-24 RX ORDER — METHYLPREDNISOLONE SODIUM SUCCINATE 40 MG/ML
40 INJECTION, POWDER, LYOPHILIZED, FOR SOLUTION INTRAMUSCULAR; INTRAVENOUS EVERY 12 HOURS SCHEDULED
Status: DISCONTINUED | OUTPATIENT
Start: 2022-03-24 | End: 2022-03-25

## 2022-03-24 RX ORDER — INSULIN GLARGINE 100 [IU]/ML
75 INJECTION, SOLUTION SUBCUTANEOUS EVERY 12 HOURS SCHEDULED
Status: DISCONTINUED | OUTPATIENT
Start: 2022-03-24 | End: 2022-03-25

## 2022-03-24 RX ORDER — MELATONIN
1000 DAILY
Status: DISCONTINUED | OUTPATIENT
Start: 2022-03-25 | End: 2022-03-29 | Stop reason: HOSPADM

## 2022-03-24 RX ORDER — ASPIRIN 81 MG/1
81 TABLET ORAL DAILY
Status: DISCONTINUED | OUTPATIENT
Start: 2022-03-25 | End: 2022-03-29 | Stop reason: HOSPADM

## 2022-03-24 RX ORDER — LOSARTAN POTASSIUM 50 MG/1
50 TABLET ORAL DAILY
Status: DISCONTINUED | OUTPATIENT
Start: 2022-03-25 | End: 2022-03-29 | Stop reason: HOSPADM

## 2022-03-24 RX ORDER — PEN NEEDLE, DIABETIC 31 GX5/16"
NEEDLE, DISPOSABLE MISCELLANEOUS
COMMUNITY
Start: 2022-02-01

## 2022-03-24 RX ORDER — TRAMADOL HYDROCHLORIDE 50 MG/1
50 TABLET ORAL EVERY 6 HOURS PRN
Status: DISCONTINUED | OUTPATIENT
Start: 2022-03-24 | End: 2022-03-29 | Stop reason: HOSPADM

## 2022-03-24 RX ORDER — IPRATROPIUM BROMIDE AND ALBUTEROL SULFATE 2.5; .5 MG/3ML; MG/3ML
3 SOLUTION RESPIRATORY (INHALATION) ONCE
Status: COMPLETED | OUTPATIENT
Start: 2022-03-24 | End: 2022-03-24

## 2022-03-24 RX ORDER — GABAPENTIN 300 MG/1
300 CAPSULE ORAL 2 TIMES DAILY
Status: DISCONTINUED | OUTPATIENT
Start: 2022-03-24 | End: 2022-03-29 | Stop reason: HOSPADM

## 2022-03-24 RX ORDER — SPIRONOLACTONE 25 MG/1
25 TABLET ORAL DAILY
Status: DISCONTINUED | OUTPATIENT
Start: 2022-03-25 | End: 2022-03-29 | Stop reason: HOSPADM

## 2022-03-24 RX ORDER — ATORVASTATIN CALCIUM 80 MG/1
80 TABLET, FILM COATED ORAL DAILY
Status: DISCONTINUED | OUTPATIENT
Start: 2022-03-25 | End: 2022-03-29 | Stop reason: HOSPADM

## 2022-03-24 RX ORDER — ASCORBIC ACID 500 MG
1000 TABLET ORAL DAILY
Status: DISCONTINUED | OUTPATIENT
Start: 2022-03-25 | End: 2022-03-29 | Stop reason: HOSPADM

## 2022-03-24 RX ORDER — QUETIAPINE FUMARATE 50 MG/1
100 TABLET, EXTENDED RELEASE ORAL EVERY MORNING
Status: DISCONTINUED | OUTPATIENT
Start: 2022-03-25 | End: 2022-03-29 | Stop reason: HOSPADM

## 2022-03-24 RX ORDER — KETOROLAC TROMETHAMINE 30 MG/ML
15 INJECTION, SOLUTION INTRAMUSCULAR; INTRAVENOUS ONCE
Status: COMPLETED | OUTPATIENT
Start: 2022-03-24 | End: 2022-03-24

## 2022-03-24 RX ORDER — GUAIFENESIN 600 MG
600 TABLET, EXTENDED RELEASE 12 HR ORAL EVERY 12 HOURS SCHEDULED
Status: DISCONTINUED | OUTPATIENT
Start: 2022-03-24 | End: 2022-03-29 | Stop reason: HOSPADM

## 2022-03-24 RX ORDER — QUETIAPINE FUMARATE 100 MG/1
300 TABLET, FILM COATED ORAL
Status: DISCONTINUED | OUTPATIENT
Start: 2022-03-24 | End: 2022-03-29 | Stop reason: HOSPADM

## 2022-03-24 RX ORDER — METOPROLOL SUCCINATE 100 MG/1
200 TABLET, EXTENDED RELEASE ORAL DAILY
Status: DISCONTINUED | OUTPATIENT
Start: 2022-03-25 | End: 2022-03-29 | Stop reason: HOSPADM

## 2022-03-24 RX ORDER — ALPRAZOLAM 0.5 MG/1
2 TABLET ORAL
Status: DISCONTINUED | OUTPATIENT
Start: 2022-03-24 | End: 2022-03-29 | Stop reason: HOSPADM

## 2022-03-24 RX ORDER — METHYLPREDNISOLONE SODIUM SUCCINATE 125 MG/2ML
80 INJECTION, POWDER, LYOPHILIZED, FOR SOLUTION INTRAMUSCULAR; INTRAVENOUS ONCE
Status: COMPLETED | OUTPATIENT
Start: 2022-03-24 | End: 2022-03-24

## 2022-03-24 RX ORDER — GABAPENTIN 100 MG/1
CAPSULE ORAL
COMMUNITY
Start: 2022-03-07 | End: 2022-03-24 | Stop reason: ALTCHOICE

## 2022-03-24 RX ORDER — LIDOCAINE 40 MG/G
CREAM TOPICAL AS NEEDED
Qty: 45 G | Refills: 0 | Status: SHIPPED | OUTPATIENT
Start: 2022-03-24 | End: 2022-04-28 | Stop reason: HOSPADM

## 2022-03-24 RX ORDER — TORSEMIDE 20 MG/1
40 TABLET ORAL DAILY
Status: DISCONTINUED | OUTPATIENT
Start: 2022-03-25 | End: 2022-03-29 | Stop reason: HOSPADM

## 2022-03-24 RX ORDER — IPRATROPIUM BROMIDE AND ALBUTEROL SULFATE 2.5; .5 MG/3ML; MG/3ML
3 SOLUTION RESPIRATORY (INHALATION) EVERY 4 HOURS PRN
Status: DISCONTINUED | OUTPATIENT
Start: 2022-03-24 | End: 2022-03-29 | Stop reason: HOSPADM

## 2022-03-24 RX ORDER — DIGOXIN 125 MCG
250 TABLET ORAL DAILY
Status: DISCONTINUED | OUTPATIENT
Start: 2022-03-25 | End: 2022-03-29 | Stop reason: HOSPADM

## 2022-03-24 RX ORDER — CHLORAL HYDRATE 500 MG
2000 CAPSULE ORAL 2 TIMES DAILY
Status: DISCONTINUED | OUTPATIENT
Start: 2022-03-24 | End: 2022-03-29 | Stop reason: HOSPADM

## 2022-03-24 RX ORDER — PANTOPRAZOLE SODIUM 40 MG/1
40 TABLET, DELAYED RELEASE ORAL
Status: DISCONTINUED | OUTPATIENT
Start: 2022-03-25 | End: 2022-03-29 | Stop reason: HOSPADM

## 2022-03-24 RX ORDER — GABAPENTIN 300 MG/1
300 CAPSULE ORAL 3 TIMES DAILY
Qty: 90 CAPSULE | Refills: 0 | Status: SHIPPED | OUTPATIENT
Start: 2022-03-24 | End: 2022-05-18 | Stop reason: SDUPTHER

## 2022-03-24 RX ADMIN — OMEGA-3 FATTY ACIDS CAP 1000 MG 2000 MG: 1000 CAP at 20:34

## 2022-03-24 RX ADMIN — GABAPENTIN 300 MG: 300 CAPSULE ORAL at 20:34

## 2022-03-24 RX ADMIN — APIXABAN 5 MG: 5 TABLET, FILM COATED ORAL at 20:34

## 2022-03-24 RX ADMIN — IPRATROPIUM BROMIDE AND ALBUTEROL SULFATE 3 ML: 2.5; .5 SOLUTION RESPIRATORY (INHALATION) at 13:15

## 2022-03-24 RX ADMIN — KETOROLAC TROMETHAMINE 15 MG: 30 INJECTION, SOLUTION INTRAMUSCULAR at 15:56

## 2022-03-24 RX ADMIN — METHYLPREDNISOLONE SODIUM SUCCINATE 80 MG: 125 INJECTION, POWDER, FOR SOLUTION INTRAMUSCULAR; INTRAVENOUS at 13:04

## 2022-03-24 RX ADMIN — GUAIFENESIN 600 MG: 600 TABLET, EXTENDED RELEASE ORAL at 22:38

## 2022-03-24 RX ADMIN — QUETIAPINE FUMARATE 300 MG: 100 TABLET ORAL at 22:38

## 2022-03-24 RX ADMIN — ALPRAZOLAM 2 MG: 0.5 TABLET ORAL at 22:38

## 2022-03-24 RX ADMIN — METHYLPREDNISOLONE SODIUM SUCCINATE 40 MG: 40 INJECTION, POWDER, FOR SOLUTION INTRAMUSCULAR; INTRAVENOUS at 20:34

## 2022-03-24 RX ADMIN — TRAMADOL HYDROCHLORIDE 50 MG: 50 TABLET, COATED ORAL at 20:34

## 2022-03-24 RX ADMIN — DICLOFENAC SODIUM TOPICAL GEL, 1%, 2 G: 10 GEL TOPICAL at 22:43

## 2022-03-24 RX ADMIN — IPRATROPIUM BROMIDE AND ALBUTEROL SULFATE 3 ML: 2.5; .5 SOLUTION RESPIRATORY (INHALATION) at 20:46

## 2022-03-24 RX ADMIN — INSULIN GLARGINE 75 UNITS: 100 INJECTION, SOLUTION SUBCUTANEOUS at 22:38

## 2022-03-24 RX ADMIN — BUSPIRONE HYDROCHLORIDE 10 MG: 10 TABLET ORAL at 20:34
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
## 2022-03-25 PROBLEM — G93.40 ENCEPHALOPATHY: Status: RESOLVED | Noted: 2022-03-24 | Resolved: 2022-03-25

## 2022-03-25 PROBLEM — G93.41 METABOLIC ENCEPHALOPATHY: Status: RESOLVED | Noted: 2022-03-24 | Resolved: 2022-03-25

## 2022-03-25 LAB
ANION GAP SERPL CALCULATED.3IONS-SCNC: 10 MMOL/L (ref 4–13)
BUN SERPL-MCNC: 25 MG/DL (ref 5–25)
CALCIUM SERPL-MCNC: 8.6 MG/DL (ref 8.3–10.1)
CHLORIDE SERPL-SCNC: 97 MMOL/L (ref 100–108)
CO2 SERPL-SCNC: 25 MMOL/L (ref 21–32)
CREAT SERPL-MCNC: 0.93 MG/DL (ref 0.6–1.3)
ERYTHROCYTE [DISTWIDTH] IN BLOOD BY AUTOMATED COUNT: 12.3 % (ref 11.6–15.1)
EST. AVERAGE GLUCOSE BLD GHB EST-MCNC: 200 MG/DL
GFR SERPL CREATININE-BSD FRML MDRD: 87 ML/MIN/1.73SQ M
GLUCOSE SERPL-MCNC: 270 MG/DL (ref 65–140)
GLUCOSE SERPL-MCNC: 323 MG/DL (ref 65–140)
GLUCOSE SERPL-MCNC: 357 MG/DL (ref 65–140)
GLUCOSE SERPL-MCNC: 371 MG/DL (ref 65–140)
GLUCOSE SERPL-MCNC: 394 MG/DL (ref 65–140)
GLUCOSE SERPL-MCNC: 402 MG/DL (ref 65–140)
HBA1C MFR BLD: 8.6 %
HCT VFR BLD AUTO: 38.8 % (ref 36.5–49.3)
HGB BLD-MCNC: 13.1 G/DL (ref 12–17)
MCH RBC QN AUTO: 31 PG (ref 26.8–34.3)
MCHC RBC AUTO-ENTMCNC: 33.8 G/DL (ref 31.4–37.4)
MCV RBC AUTO: 92 FL (ref 82–98)
PLATELET # BLD AUTO: 180 THOUSANDS/UL (ref 149–390)
PMV BLD AUTO: 10 FL (ref 8.9–12.7)
POTASSIUM SERPL-SCNC: 4.3 MMOL/L (ref 3.5–5.3)
PROCALCITONIN SERPL-MCNC: 0.09 NG/ML
RBC # BLD AUTO: 4.22 MILLION/UL (ref 3.88–5.62)
SODIUM SERPL-SCNC: 132 MMOL/L (ref 136–145)
WBC # BLD AUTO: 17.36 THOUSAND/UL (ref 4.31–10.16)

## 2022-03-25 PROCEDURE — 94761 N-INVAS EAR/PLS OXIMETRY MLT: CPT

## 2022-03-25 PROCEDURE — 97110 THERAPEUTIC EXERCISES: CPT

## 2022-03-25 PROCEDURE — 97167 OT EVAL HIGH COMPLEX 60 MIN: CPT

## 2022-03-25 PROCEDURE — 84145 PROCALCITONIN (PCT): CPT | Performed by: INTERNAL MEDICINE

## 2022-03-25 PROCEDURE — 80048 BASIC METABOLIC PNL TOTAL CA: CPT | Performed by: INTERNAL MEDICINE

## 2022-03-25 PROCEDURE — 97163 PT EVAL HIGH COMPLEX 45 MIN: CPT

## 2022-03-25 PROCEDURE — 94660 CPAP INITIATION&MGMT: CPT

## 2022-03-25 PROCEDURE — 83036 HEMOGLOBIN GLYCOSYLATED A1C: CPT | Performed by: INTERNAL MEDICINE

## 2022-03-25 PROCEDURE — 99233 SBSQ HOSP IP/OBS HIGH 50: CPT | Performed by: PHYSICIAN ASSISTANT

## 2022-03-25 PROCEDURE — 94760 N-INVAS EAR/PLS OXIMETRY 1: CPT

## 2022-03-25 PROCEDURE — 82948 REAGENT STRIP/BLOOD GLUCOSE: CPT

## 2022-03-25 PROCEDURE — 85027 COMPLETE CBC AUTOMATED: CPT | Performed by: INTERNAL MEDICINE

## 2022-03-25 PROCEDURE — 94640 AIRWAY INHALATION TREATMENT: CPT

## 2022-03-25 RX ORDER — METOCLOPRAMIDE HYDROCHLORIDE 5 MG/ML
10 INJECTION INTRAMUSCULAR; INTRAVENOUS EVERY 6 HOURS PRN
Status: DISCONTINUED | OUTPATIENT
Start: 2022-03-25 | End: 2022-03-25

## 2022-03-25 RX ORDER — PREDNISONE 10 MG/1
10 TABLET ORAL DAILY
Status: DISCONTINUED | OUTPATIENT
Start: 2022-04-05 | End: 2022-03-29 | Stop reason: HOSPADM

## 2022-03-25 RX ORDER — PREDNISONE 20 MG/1
20 TABLET ORAL DAILY
Status: DISCONTINUED | OUTPATIENT
Start: 2022-04-04 | End: 2022-03-29 | Stop reason: HOSPADM

## 2022-03-25 RX ORDER — INSULIN GLARGINE 100 [IU]/ML
85 INJECTION, SOLUTION SUBCUTANEOUS EVERY 12 HOURS SCHEDULED
Status: DISCONTINUED | OUTPATIENT
Start: 2022-03-25 | End: 2022-03-26

## 2022-03-25 RX ORDER — PREDNISONE 20 MG/1
40 TABLET ORAL DAILY
Status: DISCONTINUED | OUTPATIENT
Start: 2022-03-29 | End: 2022-03-29 | Stop reason: HOSPADM

## 2022-03-25 RX ADMIN — GUAIFENESIN 600 MG: 600 TABLET, EXTENDED RELEASE ORAL at 20:57

## 2022-03-25 RX ADMIN — IPRATROPIUM BROMIDE AND ALBUTEROL SULFATE 3 ML: 2.5; .5 SOLUTION RESPIRATORY (INHALATION) at 20:35

## 2022-03-25 RX ADMIN — ATORVASTATIN CALCIUM 80 MG: 80 TABLET, FILM COATED ORAL at 08:03

## 2022-03-25 RX ADMIN — OMEGA-3 FATTY ACIDS CAP 1000 MG 2000 MG: 1000 CAP at 17:03

## 2022-03-25 RX ADMIN — IPRATROPIUM BROMIDE AND ALBUTEROL SULFATE 3 ML: 2.5; .5 SOLUTION RESPIRATORY (INHALATION) at 00:33

## 2022-03-25 RX ADMIN — DIGOXIN 250 MCG: 125 TABLET ORAL at 08:05

## 2022-03-25 RX ADMIN — APIXABAN 5 MG: 5 TABLET, FILM COATED ORAL at 08:02

## 2022-03-25 RX ADMIN — TRAMADOL HYDROCHLORIDE 50 MG: 50 TABLET, COATED ORAL at 05:52

## 2022-03-25 RX ADMIN — BUSPIRONE HYDROCHLORIDE 10 MG: 10 TABLET ORAL at 17:03

## 2022-03-25 RX ADMIN — SPIRONOLACTONE 25 MG: 25 TABLET ORAL at 08:12

## 2022-03-25 RX ADMIN — DICLOFENAC SODIUM TOPICAL GEL, 1%, 2 G: 10 GEL TOPICAL at 08:13

## 2022-03-25 RX ADMIN — GUAIFENESIN 600 MG: 600 TABLET, EXTENDED RELEASE ORAL at 08:06

## 2022-03-25 RX ADMIN — OXYCODONE HYDROCHLORIDE AND ACETAMINOPHEN 1000 MG: 500 TABLET ORAL at 08:02

## 2022-03-25 RX ADMIN — INSULIN LISPRO 4 UNITS: 100 INJECTION, SOLUTION INTRAVENOUS; SUBCUTANEOUS at 02:17

## 2022-03-25 RX ADMIN — BUSPIRONE HYDROCHLORIDE 10 MG: 10 TABLET ORAL at 08:04

## 2022-03-25 RX ADMIN — DICLOFENAC SODIUM TOPICAL GEL, 1%, 2 G: 10 GEL TOPICAL at 15:41

## 2022-03-25 RX ADMIN — TORSEMIDE 40 MG: 20 TABLET ORAL at 08:13

## 2022-03-25 RX ADMIN — TRAMADOL HYDROCHLORIDE 50 MG: 50 TABLET, COATED ORAL at 14:13

## 2022-03-25 RX ADMIN — Medication 1000 UNITS: at 08:04

## 2022-03-25 RX ADMIN — PANTOPRAZOLE SODIUM 40 MG: 40 TABLET, DELAYED RELEASE ORAL at 05:52

## 2022-03-25 RX ADMIN — INSULIN LISPRO 35 UNITS: 100 INJECTION, SOLUTION INTRAVENOUS; SUBCUTANEOUS at 11:31

## 2022-03-25 RX ADMIN — OMEGA-3 FATTY ACIDS CAP 1000 MG 2000 MG: 1000 CAP at 08:05

## 2022-03-25 RX ADMIN — SERTRALINE HYDROCHLORIDE 50 MG: 50 TABLET ORAL at 08:12

## 2022-03-25 RX ADMIN — DICLOFENAC SODIUM TOPICAL GEL, 1%, 2 G: 10 GEL TOPICAL at 21:34

## 2022-03-25 RX ADMIN — INSULIN LISPRO 8 UNITS: 100 INJECTION, SOLUTION INTRAVENOUS; SUBCUTANEOUS at 17:04

## 2022-03-25 RX ADMIN — INSULIN LISPRO 35 UNITS: 100 INJECTION, SOLUTION INTRAVENOUS; SUBCUTANEOUS at 08:07

## 2022-03-25 RX ADMIN — METOPROLOL SUCCINATE 200 MG: 100 TABLET, EXTENDED RELEASE ORAL at 08:09

## 2022-03-25 RX ADMIN — QUETIAPINE FUMARATE 300 MG: 100 TABLET ORAL at 21:00

## 2022-03-25 RX ADMIN — Medication 1 TABLET: at 08:10

## 2022-03-25 RX ADMIN — QUETIAPINE FUMARATE 100 MG: 50 TABLET, EXTENDED RELEASE ORAL at 08:12

## 2022-03-25 RX ADMIN — ASPIRIN 81 MG: 81 TABLET, COATED ORAL at 08:03

## 2022-03-25 RX ADMIN — IPRATROPIUM BROMIDE AND ALBUTEROL SULFATE 3 ML: 2.5; .5 SOLUTION RESPIRATORY (INHALATION) at 14:45

## 2022-03-25 RX ADMIN — INSULIN LISPRO 10 UNITS: 100 INJECTION, SOLUTION INTRAVENOUS; SUBCUTANEOUS at 11:31

## 2022-03-25 RX ADMIN — FLUTICASONE FUROATE AND VILANTEROL TRIFENATATE 1 PUFF: 100; 25 POWDER RESPIRATORY (INHALATION) at 08:06

## 2022-03-25 RX ADMIN — GABAPENTIN 300 MG: 300 CAPSULE ORAL at 08:06

## 2022-03-25 RX ADMIN — APIXABAN 5 MG: 5 TABLET, FILM COATED ORAL at 17:02

## 2022-03-25 RX ADMIN — METHYLPREDNISOLONE SODIUM SUCCINATE 40 MG: 40 INJECTION, POWDER, FOR SOLUTION INTRAMUSCULAR; INTRAVENOUS at 08:11

## 2022-03-25 RX ADMIN — INSULIN LISPRO 2 UNITS: 100 INJECTION, SOLUTION INTRAVENOUS; SUBCUTANEOUS at 21:00

## 2022-03-25 RX ADMIN — INSULIN LISPRO 10 UNITS: 100 INJECTION, SOLUTION INTRAVENOUS; SUBCUTANEOUS at 08:07

## 2022-03-25 RX ADMIN — ALPRAZOLAM 2 MG: 0.5 TABLET ORAL at 21:00

## 2022-03-25 RX ADMIN — INSULIN LISPRO 5 UNITS: 100 INJECTION, SOLUTION INTRAVENOUS; SUBCUTANEOUS at 00:15

## 2022-03-25 RX ADMIN — INSULIN GLARGINE 75 UNITS: 100 INJECTION, SOLUTION SUBCUTANEOUS at 08:07

## 2022-03-25 RX ADMIN — TRAMADOL HYDROCHLORIDE 50 MG: 50 TABLET, COATED ORAL at 21:08

## 2022-03-25 RX ADMIN — IPRATROPIUM BROMIDE AND ALBUTEROL SULFATE 3 ML: 2.5; .5 SOLUTION RESPIRATORY (INHALATION) at 09:14

## 2022-03-25 RX ADMIN — INSULIN GLARGINE 85 UNITS: 100 INJECTION, SOLUTION SUBCUTANEOUS at 20:57

## 2022-03-25 RX ADMIN — GABAPENTIN 300 MG: 300 CAPSULE ORAL at 17:03

## 2022-03-25 NOTE — ASSESSMENT & PLAN NOTE
Reported increasing shortness of breath gradually progressive over last few weeks with associated cough, noted increased purulence over last 2 days  Referred to ED with increasing shortness of breath, hypoxia  Chest x-ray without any acute abnormality  · Home regimen: Trelegy & albuterol neb q6h prn  · IP regimen: Breo & duo neb tid  · IV Solu-Medrol 40 mg q 12 hours will transition to po prednisone tomorrow    · Sputum culture not yet collected  · Procalcitonin nl  · Chest PT  · Activity as tolerated

## 2022-03-25 NOTE — OCCUPATIONAL THERAPY NOTE
Occupational Therapy Evaluation/Treatment       03/25/22 1115   Note Type   Note type Evaluation   Restrictions/Precautions   Other Precautions Chair Alarm; Bed Alarm; Fall Risk;Pain   Pain Assessment   Pain Assessment Tool 0-10   Pain Score 6   Pain Location/Orientation Orientation: Lower; Location: Back   Home Living   Type of [address] One level;Elevator   Bathroom Shower/Tub Walk-in shower   Bathroom Equipment Shower chair;Grab bars in [place] [address]; Wheelchair-electric  ("[place]")   Prior Function   Level of [place] Independent with ADLs and functional mobility; Needs assistance with IADLs   Lives With Alone   ADL Assistance Independent   IADLs Needs assistance   Comments pt gets [employer]    Reports difficulty with IADLS including cleaning    ADL   Eating Assistance 5  Supervision/Setup   Grooming Assistance 4  Minimal Assistance   UB Bathing Assistance 4  Minimal Assistance   LB Bathing Assistance 3  Moderate Assistance   UB Dressing Assistance 4  Minimal Assistance   LB Dressing Assistance 3  Moderate Assistance   Toileting Assistance  4  Minimal Assistance   Bed Mobility   Supine to Sit 5  Supervision   Transfers   Sit to Stand 5  Supervision   Stand to Sit 5  Supervision   Functional Mobility   Functional Mobility 4  Minimal assistance   Additional Comments 10 feet, limited by fatigue/pain   Additional items Rolling walker   Balance   Static Sitting Fair +   Dynamic Sitting Fair   Static Standing Fair   Dynamic Standing Fair -   Activity Tolerance   Activity Tolerance Patient limited by fatigue;Patient limited by pain   RUE Overall AROM   R Shoulder Flexion 20 degrees, pt declined AAROM due to pain    R Elbow Flexion WFL   R Mass Grasp WFL   LUE Overall AROM   L Shoulder Flexion 20 degrees, pt declined AAROM due to pain    L Elbow Flexion WFL   L Mass Grasp WFL   Cognition   Overall Cognitive Status WFL   Arousal/Participation Cooperative   Attention Within functional limits   Orientation Level Oriented X4   Following Commands Follows all commands and directions without difficulty   Comments pt reports extreme fatigue, complains of pain   Assessment   Limitation Decreased ADL status; Decreased UE strength;Decreased Safe judgement during ADL;Decreased endurance;Decreased self-care trans;Decreased high-level ADLs; Decreased UE ROM  (decreased balance and mobility )   Prognosis Good   Assessment Patient evaluated by Occupational Therapy  Patient admitted with COPD with exacerbation ([facility] [address] )  The patients occupational profile, medical and therapy history includes a extensive additional review of physical, cognitive, or psychosocial history related to current functional performance  Comorbidities affecting functional mobility and ADLS include: Afib, Bipolar disorder, CAD, CHF, COPD, diabetes and hypertension  Prior to admission, patient was independent with functional mobility with walker, independent with ADLS and requiring assist for IADLS  The evaluation identifies the following performance deficits: weakness, decreased ROM, impaired balance, decreased endurance, increased fall risk, new onset of impairment of functional mobility, decreased ADLS, decreased IADLS, pain, decreased activity tolerance, decreased safety awareness, impaired judgement, SOB upon exertion and decreased strength, that result in activity limitations and/or participation restrictions  This evaluation requires clinical decision making of high complexity, because the patient presents with comorbidites that affect occupational performance and required significant modification of tasks or assistance with consideration of multiple treatment options  The Barthel Index was used as a functional outcome tool presenting with a score of Barthel Index Score: 50, indicating marked limitations of functional mobility and ADLS    The patient's raw score on the -PAC Daily Activity inpatient short form is 17, standardized score is 37 26, less than 39 4  Patients at this level are likely to benefit from DC to post-acute rehabilitation services  Please refer to the recommendation of the Occupational Therapist for safe DC planning  Patient will benefit from skilled Occupational Therapy services to address above deficits and facilitate a safe return to prior level of function  Goals   Patient Goals to get stronger and less pain   STG Time Frame   (1-7 days)   Short Term Goal  Goals established to promote Patient Goals: to get stronger and less pain:  Patient will increase standing tolerance to 3 minutes during ADL task to decrease assistance level and decrease fall risk; Patient will increase bed mobility to independent in preparation for ADLS and transfers; Patient will increase functional mobility to and from bathroom with rolling walker with supervision to increase performance with ADLS and to use a toilet; Patient will tolerate 10 minutes of UE ROM/strengthening to increase general activity tolerance and performance in ADLS/IADLS; Patient will improve functional activity tolerance to 10 minutes of sustained functional tasks to increase participation in basic self-care and decrease assistance level;  Patient will be able to to verbalize understanding and perform energy conservation/proper body mechanics during ADLS and functional mobility at least 75% of the time with minimal cueing to decrease signs of fatigue and increase stamina to return to prior level of function; Patient will increase dynamic sitting balance to fair+ to improve the ability to sit at edge of bed or on a chair for ADLS;  Patient will increase dynamic standing balance to fair to improve postural stability and decrease fall risk during standing ADLS and transfers      LTG Time Frame   (8-14 days)   Long Term Goal Patient will increase standing tolerance to 6 minutes during ADL task to decrease assistance level and decrease fall risk;  Patient will increase functional mobility to and from bathroom with rolling walker independently to increase performance with ADLS and to use a toilet; Patient will tolerate 20 minutes of UE ROM/strengthening to increase general activity tolerance and performance in ADLS/IADLS; Patient will improve functional activity tolerance to 20 minutes of sustained functional tasks to increase participation in basic self-care and decrease assistance level;  Patient will be able to to verbalize understanding and perform energy conservation/proper body mechanics during ADLS and functional mobility at least 90% of the time to decrease signs of fatigue and increase stamina to return to prior level of function; Patient will increase dynamic sitting balance to good to improve the ability to sit at edge of bed or on a chair for ADLS;  Patient will increase dynamic standing balance to fair+ to improve postural stability and decrease fall risk during standing ADLS and transfers  Pt will score >/= 21/24 on AM-PAC Daily Activity Inpatient scale to promote safe independence with ADLs and functional mobility; Pt will score >/= 80/100 on Barthel Index in order to decrease caregiver assistance needed and increase ability to perform ADLs and functional mobility  Functional Transfer Goals   Pt Will Perform All Functional Transfers   (STG independent)   ADL Goals   Pt Will Perform Grooming   (STG supervision LTG Independent)   Pt Will Perform Bathing   (STG min assist LTG supervision )   Pt Will Perform UE Dressing   (STG supervision LTG Independent )   Pt Will Perform LE Dressing   (STG min assist LTG supervision )   Pt Will Perform Toileting   (STG supervision LTG Independent )   Plan   Treatment Interventions ADL retraining;Functional transfer training;UE strengthening/ROM; Endurance training;Patient/family training;Equipment evaluation/education; Activityengagement; Compensatory technique education   Goal Expiration Date 04/08/22   OT Frequency 3-5x/wk Additional Treatment Session   Start Time 4725   End Time 1115   Treatment Assessment S: 6/10 low back pain O: Patient washed face with supervision with R hand, limited ROM shoulders  Patient completed BUE AROM 5 times each for shoulder flexion, elbow flexion, horizontal abduction/adduction and  seated in chair with rest breaks  A: Limited by pain and fatigue  Patient requires increased encouragement to participate in session and completed exercises  Patient has poor activity tolerance and limited ADL ability    P: STR   Recommendation   OT Discharge Recommendation Post acute rehabilitation services   AM-PAC Daily Activity Inpatient   Lower Body Dressing 2   Bathing 2   Toileting 3   Upper Body Dressing 3   Grooming 3   Eating 4   Daily Activity Raw Score 17   Daily Activity Standardized Score (Calc for Raw Score >=11) 37 26   AM-PAC Applied Cognition Inpatient   Following a Speech/Presentation 4   Understanding Ordinary Conversation 4   Taking Medications 4   Remembering Where Things Are Placed or Put Away 4   Remembering List of 4-5 Errands 4   Taking Care of Complicated Tasks 4   Applied Cognition Raw Score 24   Applied Cognition Standardized Score 62 21   Barthel Index   Feeding 10   Bathing 0   Grooming Score 0   Dressing Score 5   Bladder Score 10   Bowels Score 10   Toilet Use Score 5   Transfers (Bed/Chair) Score 10   Mobility (Level Surface) Score 0   Stairs Score 0   Barthel Index Score 50   Licensure   [place] License Number  Ramez Blackmanalia Davis Gary 87 OTR/L [id number]

## 2022-03-25 NOTE — CASE MANAGEMENT
Case Management Assessment & Discharge Planning Note    Patient name Arnold Sands  Location [address] [place] [address] [place] [address] MRN [id number]  : 1960 Date 3/25/2022       Current Admission Date: 3/24/2022  Current Admission Diagnosis:COPD with exacerbation [facility])   Patient Active Problem List    Diagnosis Date Noted    Chronic pain syndrome 2022    Foraminal stenosis of lumbar region 2022    Lumbar post-laminectomy syndrome 2022    Lumbar radiculopathy 2022    Shortness of breath 2022    SIRS (systemic inflammatory response syndrome) ([facility] [address] ) 2022    Dysuria 2021    Peripheral neuropathy 2021    Chronic left-sided low back pain with left-sided sciatica 2021    BMI 40 0-44 9, adult ([facility] [address] ) 2021    Muscle weakness (generalized) 2021    [facility]    Medicare annual wellness visit, subsequent 2021    Chronic congestive heart failure ([facility] [address] ) 2020    Leukocytosis 10/29/2020    Hyperlipidemia 10/29/2020    Nutritional anemia, unspecified  10/29/2020    Chest pain 10/11/2020    Dizziness 10/11/2020    Simple chronic bronchitis ([facility] [address] ) 10/11/2020    PAF (paroxysmal atrial fibrillation) ([facility] [address] ) 2020    Hyperglycemia 2020    Transition of care performed with sharing of clinical summary 2020    Obstructive sleep apnea 2020    Obesity (BMI 30-39 9) 2020    Stage 2 chronic kidney disease 2020    Hyponatremia 2020    COPD (chronic obstructive pulmonary disease) ([facility] [address] ) 2020    Chronic a-fib ([facility] [address] ) 2020    H/O vitamin D deficiency 10/28/2019    Dyslipidemia 2019    Type 2 diabetes mellitus with complication, with long-term current use of insulin ([facility] [address] ) 2019    Restrictive ventilatory defect 2019    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 [facility]) 2019    Lung disease, restrictive 2018  Chronic respiratory failure with hypoxia ([place] [address] ) 10/31/2018    Coronary artery disease involving native heart 09/06/2017    Esophageal reflux 09/06/2017    Back ache 11/30/2016    SHAVONNE and COPD overlap syndrome ([place] [address] )     Morbid obesity ([place] [address] )     Uncontrolled type 2 diabetes mellitus with hyperglycemia ([place] [address] ) 09/02/2016    Fatigue 09/02/2016    Chronic reflux esophagitis 06/08/2016    Cough 01/13/2016    COPD with exacerbation ([place] [address] ) 01/13/2016    Bipolar affective disorder ([place] [address] )     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/25/2015    Erectile dysfunction of organic origin 08/25/2014    Benign essential hypertension 09/04/2012    Diabetic neuropathy ([place] [address] ) 09/04/2012    Panic disorder without agoraphobia 09/04/2012    Vitamin D deficiency 09/04/2012      LOS (days): 1  Geometric Mean LOS (GMLOS) (days): 2 90  Days to GMLOS:2 1     OBJECTIVE:    Risk of Unplanned Readmission Score: 32         Current admission status: Inpatient       Preferred Pharmacy:   [address] [place] [address]  Phone: [phone] Fax: [phone]    Primary Care Provider: Kaden Salas MD    Primary Insurance: [facility] [place]  Secondary Insurance: [facility]    ASSESSMENT:  [employer]     [address]   Primary Phone: [phone] (Mobile)                  Readmission Root Cause  30 Day Readmission: No    Patient Information  Admitted from[de-identified] Home  Mental Status: Alert  During Assessment patient was accompanied by: Not accompanied during assessment  Assessment information provided by[de-identified] Patient  Primary Caregiver: Self  Support Systems: Family members  [place] of Residence: [address] [place] [address] do you live in?: [address] entry access options   Select all that apply : Elevator  Type of Current Residence: Apartment  Floor Level: 2  Upon entering residence, is there a bedroom on the main floor (no further steps)?: Yes  Upon entering residence, is there a bathroom on the main floor (no further steps)?: Yes  In the last 12 months, was there a time when you were not able to pay the mortgage or rent on time?: No  In the last 12 months, how many places have you lived?: 1  In the last 12 months, was there a time when you did not have a steady place to sleep or slept in a shelter (including now)?: No  Homeless/housing insecurity resource given?: N/A  Living Arrangements: Lives Alone  Is patient a ?: No    Activities of Daily Living Prior to Admission  Functional Status: Independent  Completes ADLs independently?: Yes  Ambulates independently?: No  Level of ambulatory dependence: Assistance  Does patient use assisted devices?: Yes  Assisted Devices (DME) used: Straight Cane,Walker,Electric wheelchair,Portable Oxygen concentrator,Home Oxygen concentrator,Portable Oxygen tanks  DME Company Name (respiratory supplies): [employer]  O2 Rate(s): 3  Does patient currently own DME?: Yes  What DME does the patient currently own?: Straight Cane,Walker,Wheelchair,Electric Wheelchair,Portable Oxygen tanks,Home Oxygen concentrator,Portable Oxygen concentrator  Does patient have a history of Outpatient Therapy (PT/OT)?: No  Does the patient have a history of Short-Term Rehab?: Yes (pt would like to go back to [facility])  Does patient have a history of HHC?: Yes  [facility] No         Patient Information Continued  Income Source: SSI/SSD  Does patient have prescription coverage?: Yes  Within the past 12 months, you worried that your food would run out before you got the money to buy more : Never true  Within the past 12 months, the food you bought just didnt last and you didnt have money to get more : Never true  Food insecurity resource given?: N/A  Does patient receive dialysis treatments?: No  Does patient have a history of substance abuse?: No  Does patient have a history of Mental Health Diagnosis?: Yes (bipolar)  Is patient receiving treatment for mental health?: Yes         Means of Transportation  Means of Transport to Appts[de-identified] Reina Rocha  In the past 12 months, has lack of transportation kept you from medical appointments or from getting medications?: No  In the past 12 months, has lack of transportation kept you from meetings, work, or from getting things needed for daily living?: No  Was application for public transport provided?: N/A        DISCHARGE DETAILS:    Discharge planning discussed with[de-identified] Patient  Freedom of Choice: Yes  Comments - Freedom of Choice: pt understands ability to be involved in hospital care and with discharge planning  CM contacted family/caregiver?: No- see comments (pt declined)             Contacts  Patient Contacts: Lisette Cueto (friend)  Relationship to Patient[de-identified] Friend  Contact Method: Phone  Phone Number: [phone]  Reason/Outcome: Emergency Contact      Would you like to participate in our [address] service program?  : No - Declined     IMM Given (Date):: 03/25/22 (reviewed with pt, pt agreeable to plan  imm placed in scan bin, pt declined copy)  IMM Given to[de-identified] Patient      Cm spoke with pt, verified demographics and performed assessment  Pt would like to go from post acute stay to [facility]  Pt/Ot evals needed for placement  Cm will follow

## 2022-03-25 NOTE — ASSESSMENT & PLAN NOTE
· Orthostatics negative  · Postural dizziness likely from autonomic dysfunction from uncontrolled diabetes     · Exam is non-focal

## 2022-03-25 NOTE — ASSESSMENT & PLAN NOTE
BUN   Date Value Ref Range Status   03/25/2022 25 5 - 25 mg/dL Final   03/24/2022 29 (H) 5 - 25 mg/dL Final   02/08/2022 38 (H) 5 - 25 mg/dL Final     Creatinine   Date Value Ref Range Status   03/25/2022 0 93 0 60 - 1 30 mg/dL Final     Comment:     Standardized to IDMS reference method   03/24/2022 1 07 0 60 - 1 30 mg/dL Final     Comment:     Standardized to IDMS reference method   02/08/2022 1 09 0 60 - 1 30 mg/dL Final     Comment:     Standardized to IDMS reference method

## 2022-03-25 NOTE — PLAN OF CARE
Problem: PAIN - ADULT  Goal: Verbalizes/displays adequate comfort level or baseline comfort level  Description: Interventions:  - Encourage patient to monitor pain and request assistance  - Assess pain using appropriate pain scale  - Administer analgesics based on type and severity of pain and evaluate response  - Implement non-pharmacological measures as appropriate and evaluate response  - Consider cultural and social influences on pain and pain management  - Notify physician/advanced practitioner if interventions unsuccessful or patient reports new pain  Outcome: Progressing     Problem: RESPIRATORY - ADULT  Goal: Achieves optimal ventilation and oxygenation  Description: INTERVENTIONS:  - Assess for changes in respiratory status  - Assess for changes in mentation and behavior  - Position to facilitate oxygenation and minimize respiratory effort  - Oxygen administered by appropriate delivery if ordered  - Initiate smoking cessation education as indicated  - Encourage broncho-pulmonary hygiene including cough, deep breathe, Incentive Spirometry  - Assess the need for suctioning and aspirate as needed  - Assess and instruct to report SOB or any respiratory difficulty  - Respiratory Therapy support as indicated  Outcome: Progressing     Problem: MOBILITY - ADULT  Goal: Maintain or return to baseline ADL function  Description: INTERVENTIONS:  -  Assess patient's ability to carry out ADLs; assess patient's baseline for ADL function and identify physical deficits which impact ability to perform ADLs (bathing, care of mouth/teeth, toileting, grooming, dressing, etc )  - Assess/evaluate cause of self-care deficits   - Assess range of motion  - Assess patient's mobility; develop plan if impaired  - Assess patient's need for assistive devices and provide as appropriate  - Encourage maximum independence but intervene and supervise when necessary  - Involve family in performance of ADLs  - Assess for home care needs following discharge   - Consider OT consult to assist with ADL evaluation and planning for discharge  - Provide patient education as appropriate  Outcome: Progressing  Goal: Maintains/Returns to pre admission functional level  Description: INTERVENTIONS:  - Perform BMAT or MOVE assessment daily    - Set and communicate daily mobility goal to care team and patient/family/caregiver     - Collaborate with rehabilitation services on mobility goals if consulted        - Ambulate patient 3 times a day  - Out of bed to chair 3 times a day   - Out of bed for meals 3 times a day  - Out of bed for toileting  - Record patient progress and toleration of activity level   Outcome: Progressing
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
## 2022-03-25 NOTE — CASE MANAGEMENT
Case Management Discharge Planning Note    Patient name Murali Prieto  Location [facility] [place] [facility] MRN [id number]  : 1960 Date 3/25/2022       Current Admission Date: 3/24/2022  Current Admission Diagnosis:COPD with exacerbation [facility])   Patient Active Problem List    Diagnosis Date Noted    Chronic pain syndrome 2022    Foraminal stenosis of lumbar region 2022    Lumbar post-laminectomy syndrome 2022    Lumbar radiculopathy 2022    Shortness of breath 2022    SIRS (systemic inflammatory response syndrome) ([facility] ) 2022    Dysuria 2021    Peripheral neuropathy 2021    Chronic left-sided low back pain with left-sided sciatica 2021    BMI 40 0-44 9, adult ([facility] ) 2021    Muscle weakness (generalized) 2021    Platelets decreased [facility] ) 2021    Medicare annual wellness visit, subsequent 2021    Chronic congestive heart failure ([facility] ) 2020    Leukocytosis 10/29/2020    Hyperlipidemia 10/29/2020    Nutritional anemia, unspecified  10/29/2020    Chest pain 10/11/2020    Dizziness 10/11/2020    Simple chronic bronchitis ([facility] ) 10/11/2020    PAF (paroxysmal atrial fibrillation) ([facility] ) 2020    Hyperglycemia 2020    Transition of care performed with sharing of clinical summary 2020    Obstructive sleep apnea 2020    Obesity (BMI 30-39 9) 2020    Stage 2 chronic kidney disease 2020    Hyponatremia 2020    COPD (chronic obstructive pulmonary disease) ([facility] ) 2020    Chronic a-fib ([facility] ) 2020    H/O vitamin D deficiency 10/28/2019    Dyslipidemia 2019    Type 2 diabetes mellitus with complication, with long-term current use of insulin ([facility] ) 2019    Restrictive ventilatory defect 2019    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 [facility]) 2019    Lung disease, restrictive 2018    Chronic respiratory failure with hypoxia ([place] [address] ) 10/31/2018    Coronary artery disease involving native heart 09/06/2017    Esophageal reflux 09/06/2017    Back ache 11/30/2016    SHAVONNE and COPD overlap syndrome ([place] [address] )     Morbid obesity ([place] [address] )     Uncontrolled type 2 diabetes mellitus with hyperglycemia ([place] [address] ) 09/02/2016    Fatigue 09/02/2016    Chronic reflux esophagitis 06/08/2016    Cough 01/13/2016    COPD with exacerbation ([place] [address] ) 01/13/2016    Bipolar affective disorder ([place] [address] )     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/25/2015    Erectile dysfunction of organic origin 08/25/2014    Benign essential hypertension 09/04/2012    Diabetic neuropathy ([place] [address] ) 09/04/2012    Panic disorder without agoraphobia 09/04/2012    Vitamin D deficiency 09/04/2012      LOS (days): 1  Geometric Mean LOS (GMLOS) (days): 2 90  Days to GMLOS:1 9     OBJECTIVE:  Risk of Unplanned Readmission Score: 33         Current admission status: Inpatient   Preferred Pharmacy:   [address] [place] [address]  Phone: [phone] Fax: [phone]    Primary Care Provider: Harrison Enrique MD    Primary Insurance: MEDICARE [address] [facility]  Secondary Insurance:     DISCHARGE DETAILS:  [facility] [address] [place] [address] Number: pending ref# [id number] Todd Murray started with [employer] for [facility] [place] NPI: [id number] Attending Dr Pricila Rucker NPI: [id number] clinicals faxed to [phone]

## 2022-03-25 NOTE — ASSESSMENT & PLAN NOTE
· Follows up with Pain Management  · Continue Tylenol, diclofenac topical gel, gabapentin  · PT/OT - rehab

## 2022-03-25 NOTE — RESPIRATORY THERAPY NOTE
Home Oxygen Qualifying Test     Patient name: Chloe Or        : 1960   Date of Test:  2022  Diagnosis: COPD with exacerbation   Home Oxygen Test:    **Medicare Guidelines require item(s) 1-5 on all ambulatory patients or 1 and 2 on non-ambulatory patients  1  Baseline SPO2 on Room Air at rest 96 %   a  If <= 88% on Room Air add O2 via NC to obtain SpO2 >=88%  If LPM needed, document LPM  needed to reach =>88%    2  SPO2 during exertion on Room Air 93 %  a  During exertion monitor SPO2  If SPO2 increases >=89%, do not add supplemental oxygen    3  SPO2 on Oxygen at Rest NA% at NA LPM    4  SPO2 during exertion on Oxygen NA % at NA LPM    5  Test performed during exertion activity  []  Supplemental Home Oxygen is indicated  [x]  Client does not qualify for home oxygen  Respiratory Additional Notes- PT walked with rolling walker approximately 100 feet  PT did stop on 2 occasion to rest due to dyspnea and lower back pain  PT's SPO2 did not go below 93 % during ambulation       Leatha Lopez, RT

## 2022-03-25 NOTE — PROGRESS NOTES
[facility]  Progress Note Lavon Davies 1960, 57 y o  male MRN: [id number]  Unit/Bed#: 2 [address] Encounter: [id number]  Primary Care Provider: Sherry Ramirez MD   Date and time admitted to hospital: 3/24/2022 10:29 AM    * COPD with exacerbation ([address] )  Assessment & Plan  Reported increasing shortness of breath gradually progressive over last few weeks with associated cough, noted increased purulence over last 2 days  Referred to ED with increasing shortness of breath, hypoxia  Chest x-ray without any acute abnormality  · Home regimen: Trelegy & albuterol neb q6h prn  · IP regimen: Breo & duo neb tid  · IV Solu-Medrol 40 mg q 12 hours will transition to po prednisone tomorrow  · Sputum culture not yet collected  · Procalcitonin nl  · Chest PT  · Activity as tolerated    Chronic respiratory failure with hypoxia (HCC)  Assessment & Plan  On 2-3 L of supplemental oxygen at baseline, will order home o2 eval prior to dc    Chronic left-sided low back pain with left-sided sciatica  Assessment & Plan  · Follows up with Pain Management  · Continue Tylenol, diclofenac topical gel, gabapentin  · PT/OT - rehab    Peripheral neuropathy  Assessment & Plan  Continue gabapentin    Chronic congestive heart failure [facility])  Assessment & Plan  Wt Readings from Last 3 Encounters:   03/24/22 (!) 137 kg (301 lb)   02/07/22 (!) 137 kg (301 lb)   01/11/22 136 kg (300 lb)     Chest x-ray without any evidence of CHF  ProBNP low at 183  No evidence of volume overload clinically  Continue metoprolol, digoxin, spironolactone, torsemide        Dizziness  Assessment & Plan  · Orthostatics negative  · Postural dizziness likely from autonomic dysfunction from uncontrolled diabetes     · Exam is non-focal    Chronic a-fib (HCC)  Assessment & Plan  Controlled  Continue metoprolol, digoxin, Eliquis    Stage 2 chronic kidney disease  Assessment & Plan  BUN   Date Value Ref Range Status   03/25/2022 25 5 - 25 mg/dL Final   03/24/2022 29 (H) 5 - 25 mg/dL Final   02/08/2022 38 (H) 5 - 25 mg/dL Final     Creatinine   Date Value Ref Range Status   03/25/2022 0 93 0 60 - 1 30 mg/dL Final     Comment:     Standardized to IDMS reference method   03/24/2022 1 07 0 60 - 1 30 mg/dL Final     Comment:     Standardized to IDMS reference method   02/08/2022 1 09 0 60 - 1 30 mg/dL Final     Comment:     Standardized to IDMS reference method       Benign essential hypertension  Assessment & Plan  Stable  Monitor on metoprolol , losartan, spironolactone    Coronary artery disease involving native heart  Assessment & Plan  Continue aspirin, metoprolol, statin    SHAVONNE and COPD overlap syndrome ([address] )  Assessment & Plan  On BiPAP 12/5  Reports compliance    Uncontrolled type 2 diabetes mellitus with hyperglycemia [facility])  Assessment & Plan  Lab Results   Component Value Date    HGBA1C 8 6 (H) 03/25/2022       Recent Labs     03/25/22  0207 03/25/22  0735 03/25/22  1100 03/25/22  1602   POCGLU 402* 394* 371* 323*       Blood Sugar Average: Last 72 hrs:  (P) 141     · Home regimen : Lantus 75 units b i d  With pre meal insulin 35 units Q a c   · IP regimen: Lantus 85 u bid & lispro 45 +ssi achs  · Monitor on steroids    Bipolar affective disorder [facility])  Assessment & Plan  Continue Zoloft, Seroquel, Xanax    Metabolic encephalopathy-resolved as of 3/25/2022  Assessment & Plan  Patient reported increasing fatigue and lethargy  Noted to be somnolent on presentation  CT head unremarkable  ABG without any hypercarbia  Patient improved spontaneously after presentation  Possibly hypoxia or medications related, patient denies any new medication or excessive use of sedatives/opiates  · Continue supplemental oxygen  · Monitor mental status      VTE Pharmacologic Prophylaxis: VTE Score: 4 Moderate Risk (Score 3-4) - Pharmacological DVT Prophylaxis Ordered: apixaban (Eliquis)      Patient Centered Rounds: I performed bedside rounds with nursing staff today   Discussions with Specialists or Other Care Team Provider: cm    Education and Discussions with Family / Patient: Patient declined call to   Time Spent for Care: 30 minutes  More than 50% of total time spent on counseling and coordination of care as described above  Current Length of Stay: 1 day(s)  Current Patient Status: Inpatient   Certification Statement: The patient will continue to require additional inpatient hospital stay due to iv steroid, monitoring blood glucose, placement  Discharge Plan: Anticipate discharge in 24-48 hrs to rehab facility  Code Status: Level 1 - Full Code    Subjective:   Dizziness with ambulation has been happening for months but more increased in frequency in the last "week or so" to every time he stands  No vision changes or floaters, no headache or paresthesia, paralysis or urinary or fecal incontience, no preference or lean  Hasn't resulted in a fall yet  Doesn't improve when he tries to stabilize himself on the furniature or walls  Still with antony especially after he sits down from ambulating  No sig cough  Objective:     Vitals:   Temp (24hrs), Av 9 °F (36 6 °C), Min:97 6 °F (36 4 °C), Max:98 °F (36 7 °C)    Temp:  [97 6 °F (36 4 °C)-98 °F (36 7 °C)] 97 6 °F (36 4 °C)  HR:  [77-95] 93  Resp:  [18-20] 18  BP: (103-155)/(53-84) 131/78  SpO2:  [90 %-99 %] 90 %  Body mass index is 38 74 kg/m²  Input and Output Summary (last 24 hours): Intake/Output Summary (Last 24 hours) at 3/25/2022 1616  Last data filed at 3/25/2022 1545  Gross per 24 hour   Intake 2727 ml   Output 4950 ml   Net -2223 ml       Physical Exam:   Physical Exam  Vitals and nursing note reviewed  Constitutional:       General: He is not in acute distress  Appearance: He is obese  He is ill-appearing  He is not toxic-appearing or diaphoretic  HENT:      Head: Normocephalic and atraumatic        Nose: Nose normal       Mouth/Throat:      Mouth: Mucous membranes are moist    Eyes:      Extraocular Movements: Extraocular movements intact  Conjunctiva/sclera: Conjunctivae normal    Cardiovascular:      Rate and Rhythm: Normal rate and regular rhythm  Pulses: Normal pulses  Heart sounds: Normal heart sounds  Pulmonary:      Effort: No respiratory distress  Breath sounds: Normal breath sounds  No stridor  No wheezing or rales  Comments: Poor inspiratory effort  Abdominal:      General: Bowel sounds are normal  There is no distension  Palpations: Abdomen is soft  Tenderness: There is no abdominal tenderness  There is no guarding  Comments: Central obesity   Musculoskeletal:         General: No swelling or tenderness  Cervical back: Neck supple  Skin:     General: Skin is warm and dry  Coloration: Skin is pale  Neurological:      General: No focal deficit present  Mental Status: He is oriented to person, place, and time  Psychiatric:         Mood and Affect: Mood normal          Behavior: Behavior normal       Comments: Flat affect          Additional Data:     Labs:  Results from last 7 days   Lab Units 03/25/22  0612 03/24/22  1044 03/24/22  1044   WBC Thousand/uL 17 36*   < > 15 11*   HEMOGLOBIN g/dL 13 1   < > 14 3   HEMATOCRIT % 38 8   < > 41 5   PLATELETS Thousands/uL 180   < > 187   LYMPHO PCT %  --   --  30   MONO PCT %  --   --  4   EOS PCT %  --   --  0    < > = values in this interval not displayed       Results from last 7 days   Lab Units 03/25/22  0612 03/24/22  2152 03/24/22  1044   SODIUM mmol/L 132*  --  135*   POTASSIUM mmol/L 4 3  --  3 8   CHLORIDE mmol/L 97*  --  99*   CO2 mmol/L 25  --  26   BUN mg/dL 25  --  29*   CREATININE mg/dL 0 93  --  1 07   ANION GAP mmol/L 10  --  10   CALCIUM mg/dL 8 6  --  8 8   ALBUMIN g/dL  --   --  3 9   TOTAL BILIRUBIN mg/dL  --   --  0 40   ALK PHOS U/L  --   --  87   ALT U/L  --   --  45   AST U/L  --   --  26   GLUCOSE RANDOM mg/dL 357*   < > 209*    < > = values in this interval not displayed  Results from last 7 days   Lab Units 03/25/22  1602 03/25/22  1100 03/25/22  0735 03/25/22  0207 03/24/22  2336 03/24/22  2104 03/24/22  1553   POC GLUCOSE mg/dl 323* 371* 394* 402* 475* >500* 141*     Results from last 7 days   Lab Units 03/25/22  0612   HEMOGLOBIN A1C % 8 6*     Results from last 7 days   Lab Units 03/25/22  0612 03/24/22  2049   PROCALCITONIN ng/ml 0 09 0 05       Lines/Drains:  Invasive Devices  Report    Peripheral Intravenous Line            Peripheral IV 03/24/22 Left Antecubital 1 day                      Imaging: No pertinent imaging reviewed      Recent Cultures (last 7 days): none        Last 24 Hours Medication List:   Current Facility-Administered Medications   Medication Dose Route Frequency Provider Last Rate    acetaminophen  650 mg Oral Q4H PRN Shahrzad Kauffman MD      ALPRAZolam  2 mg Oral HS Shahrzad Kauffman MD      apixaban  5 mg Oral BID Shahrzad Kauffman MD      vitamin C  1,000 mg Oral Daily Shahrzad Kauffman MD      aspirin  81 mg Oral Daily Shahrzad Kauffman MD      atorvastatin  80 mg Oral Daily Shahrzad Kauffman MD      busPIRone  10 mg Oral BID Shahrzad Kauffman MD      cholecalciferol  1,000 Units Oral Daily Shahrzad Kauffman MD      Diclofenac Sodium  2 g Topical 4x Daily PRN Shahrzad Kauffman MD      digoxin  250 mcg Oral Daily Shahrzad Kauffman MD      fish oil  2,000 mg Oral BID Shahrzad Kauffman MD      fluticasone-vilanterol  1 puff Inhalation Daily Shahrzad Kauffman MD      gabapentin  300 mg Oral BID Shahrzad Kauffman MD      guaiFENesin  600 mg Oral Q12H Pat Gatse MD      insulin glargine  85 Units Subcutaneous Q12H [facility] Jayleen Fernando PA-C      insulin lispro  1-5 Units Subcutaneous HS Shahrzad Kauffman MD      insulin lispro  1-5 Units Subcutaneous 0200 Shahrzad Kauffman MD      insulin lispro  2-12 Units Subcutaneous TID AC Shahrzad Kauffman MD      insulin lispro  45 Units Subcutaneous TID With Meals Owen Gonzalez MANGO Fernando      ipratropium-albuterol  3 mL Nebulization Q6H Rosa M Soliz MD      ipratropium-albuterol  3 mL Nebulization Q4H PRN Rosa M Soliz MD      losartan  50 mg Oral Daily Rosa M Soliz MD      metoprolol succinate  200 mg Oral Daily Rosa M Soliz MD      multivitamin-minerals  1 tablet Oral Daily Rosa M Soliz MD      pantoprazole  40 mg Oral Early Morning Rosa M Soliz MD      [START ON 3/26/2022] predniSONE  50 mg Oral Daily Jayleen Fernando PA-C      Followed by   Claudene Durham ON 3/29/2022] predniSONE  40 mg Oral Daily Jayleen Fernando PA-C      Followed by   Claudene Durham ON 4/1/2022] predniSONE  30 mg Oral Daily Jayleen Fernando PA-C      Followed by   Claudene Durham ON 4/4/2022] predniSONE  20 mg Oral Daily Jayleen Fernando PA-C      Followed by   Claudene Durham ON 4/5/2022] predniSONE  10 mg Oral Daily Jayleen Fernando PA-C      QUEtiapine  100 mg Oral QAM Rosa M Soliz MD      QUEtiapine  300 mg Oral HS Rosa M Soliz MD      sertraline  50 mg Oral Daily Rosa M Soliz MD      spironolactone  25 mg Oral Daily Rosa M Soliz MD      torsemide  40 mg Oral Daily Rosa M Soliz MD      traMADol  50 mg Oral Q6H PRN Rosa M Soliz MD          Today, Patient Was Seen By: hCris Roberts PA-C    **Please Note: This note may have been constructed using a voice recognition system  **

## 2022-03-25 NOTE — ASSESSMENT & PLAN NOTE
Patient reported increasing fatigue and lethargy  Noted to be somnolent on presentation  CT head unremarkable    ABG without any hypercarbia  Patient improved spontaneously after presentation  Possibly hypoxia or medications related, patient denies any new medication or excessive use of sedatives/opiates  · Continue supplemental oxygen  · Monitor mental status

## 2022-03-25 NOTE — ASSESSMENT & PLAN NOTE
Lab Results   Component Value Date    HGBA1C 8 6 (H) 03/25/2022       Recent Labs     03/25/22  0207 03/25/22  0735 03/25/22  1100 03/25/22  1602   POCGLU 402* 394* 371* 323*       Blood Sugar Average: Last 72 hrs:  (P) 141     · Home regimen : Lantus 75 units b i d   With pre meal insulin 35 units Q a c   · IP regimen: Lantus 85 u bid & lispro 45 +ssi achs  · Monitor on steroids

## 2022-03-25 NOTE — ASSESSMENT & PLAN NOTE
Wt Readings from Last 3 Encounters:   03/24/22 (!) 137 kg (301 lb)   02/07/22 (!) 137 kg (301 lb)   01/11/22 136 kg (300 lb)     Chest x-ray without any evidence of CHF    ProBNP low at 183  No evidence of volume overload clinically  Continue metoprolol, digoxin, spironolactone, torsemide

## 2022-03-26 LAB
ANION GAP SERPL CALCULATED.3IONS-SCNC: 12 MMOL/L (ref 4–13)
BUN SERPL-MCNC: 20 MG/DL (ref 5–25)
CALCIUM SERPL-MCNC: 7.8 MG/DL (ref 8.3–10.1)
CHLORIDE SERPL-SCNC: 91 MMOL/L (ref 100–108)
CO2 SERPL-SCNC: 26 MMOL/L (ref 21–32)
CREAT SERPL-MCNC: 1.03 MG/DL (ref 0.6–1.3)
GFR SERPL CREATININE-BSD FRML MDRD: 77 ML/MIN/1.73SQ M
GLUCOSE SERPL-MCNC: 136 MG/DL (ref 65–140)
GLUCOSE SERPL-MCNC: 158 MG/DL (ref 65–140)
GLUCOSE SERPL-MCNC: 273 MG/DL (ref 65–140)
GLUCOSE SERPL-MCNC: 287 MG/DL (ref 65–140)
GLUCOSE SERPL-MCNC: 287 MG/DL (ref 65–140)
GLUCOSE SERPL-MCNC: 301 MG/DL (ref 65–140)
POTASSIUM SERPL-SCNC: 3.2 MMOL/L (ref 3.5–5.3)
SODIUM SERPL-SCNC: 129 MMOL/L (ref 136–145)

## 2022-03-26 PROCEDURE — 99232 SBSQ HOSP IP/OBS MODERATE 35: CPT | Performed by: INTERNAL MEDICINE

## 2022-03-26 PROCEDURE — 82948 REAGENT STRIP/BLOOD GLUCOSE: CPT

## 2022-03-26 PROCEDURE — 94760 N-INVAS EAR/PLS OXIMETRY 1: CPT

## 2022-03-26 PROCEDURE — 94640 AIRWAY INHALATION TREATMENT: CPT

## 2022-03-26 PROCEDURE — 94660 CPAP INITIATION&MGMT: CPT

## 2022-03-26 PROCEDURE — 80048 BASIC METABOLIC PNL TOTAL CA: CPT | Performed by: INTERNAL MEDICINE

## 2022-03-26 RX ORDER — INSULIN GLARGINE 100 [IU]/ML
75 INJECTION, SOLUTION SUBCUTANEOUS EVERY 12 HOURS SCHEDULED
Status: DISCONTINUED | OUTPATIENT
Start: 2022-03-26 | End: 2022-03-29 | Stop reason: HOSPADM

## 2022-03-26 RX ADMIN — INSULIN LISPRO 6 UNITS: 100 INJECTION, SOLUTION INTRAVENOUS; SUBCUTANEOUS at 11:41

## 2022-03-26 RX ADMIN — GUAIFENESIN 600 MG: 600 TABLET, EXTENDED RELEASE ORAL at 08:29

## 2022-03-26 RX ADMIN — GUAIFENESIN 600 MG: 600 TABLET, EXTENDED RELEASE ORAL at 21:16

## 2022-03-26 RX ADMIN — INSULIN LISPRO 3 UNITS: 100 INJECTION, SOLUTION INTRAVENOUS; SUBCUTANEOUS at 21:16

## 2022-03-26 RX ADMIN — SERTRALINE HYDROCHLORIDE 50 MG: 50 TABLET ORAL at 08:29

## 2022-03-26 RX ADMIN — DICLOFENAC SODIUM TOPICAL GEL, 1%, 2 G: 10 GEL TOPICAL at 14:06

## 2022-03-26 RX ADMIN — DIGOXIN 250 MCG: 125 TABLET ORAL at 08:27

## 2022-03-26 RX ADMIN — GABAPENTIN 300 MG: 300 CAPSULE ORAL at 08:30

## 2022-03-26 RX ADMIN — FLUTICASONE FUROATE AND VILANTEROL TRIFENATATE 1 PUFF: 100; 25 POWDER RESPIRATORY (INHALATION) at 08:31

## 2022-03-26 RX ADMIN — BUSPIRONE HYDROCHLORIDE 10 MG: 10 TABLET ORAL at 17:23

## 2022-03-26 RX ADMIN — Medication 1 TABLET: at 08:27

## 2022-03-26 RX ADMIN — Medication 1000 UNITS: at 08:27

## 2022-03-26 RX ADMIN — APIXABAN 5 MG: 5 TABLET, FILM COATED ORAL at 08:29

## 2022-03-26 RX ADMIN — INSULIN LISPRO 1 UNITS: 100 INJECTION, SOLUTION INTRAVENOUS; SUBCUTANEOUS at 02:14

## 2022-03-26 RX ADMIN — INSULIN GLARGINE 75 UNITS: 100 INJECTION, SOLUTION SUBCUTANEOUS at 21:17

## 2022-03-26 RX ADMIN — OXYCODONE HYDROCHLORIDE AND ACETAMINOPHEN 1000 MG: 500 TABLET ORAL at 08:29

## 2022-03-26 RX ADMIN — TRAMADOL HYDROCHLORIDE 50 MG: 50 TABLET, COATED ORAL at 08:30

## 2022-03-26 RX ADMIN — BUSPIRONE HYDROCHLORIDE 10 MG: 10 TABLET ORAL at 08:27

## 2022-03-26 RX ADMIN — IPRATROPIUM BROMIDE AND ALBUTEROL SULFATE 3 ML: 2.5; .5 SOLUTION RESPIRATORY (INHALATION) at 07:15

## 2022-03-26 RX ADMIN — QUETIAPINE FUMARATE 100 MG: 50 TABLET, EXTENDED RELEASE ORAL at 08:31

## 2022-03-26 RX ADMIN — GABAPENTIN 300 MG: 300 CAPSULE ORAL at 17:23

## 2022-03-26 RX ADMIN — QUETIAPINE FUMARATE 300 MG: 100 TABLET ORAL at 21:16

## 2022-03-26 RX ADMIN — INSULIN GLARGINE 85 UNITS: 100 INJECTION, SOLUTION SUBCUTANEOUS at 08:31

## 2022-03-26 RX ADMIN — DICLOFENAC SODIUM TOPICAL GEL, 1%, 2 G: 10 GEL TOPICAL at 19:43

## 2022-03-26 RX ADMIN — OMEGA-3 FATTY ACIDS CAP 1000 MG 2000 MG: 1000 CAP at 08:27

## 2022-03-26 RX ADMIN — APIXABAN 5 MG: 5 TABLET, FILM COATED ORAL at 17:23

## 2022-03-26 RX ADMIN — TRAMADOL HYDROCHLORIDE 50 MG: 50 TABLET, COATED ORAL at 20:17

## 2022-03-26 RX ADMIN — IPRATROPIUM BROMIDE AND ALBUTEROL SULFATE 3 ML: 2.5; .5 SOLUTION RESPIRATORY (INHALATION) at 00:02

## 2022-03-26 RX ADMIN — ATORVASTATIN CALCIUM 80 MG: 80 TABLET, FILM COATED ORAL at 08:28

## 2022-03-26 RX ADMIN — ASPIRIN 81 MG: 81 TABLET, COATED ORAL at 08:27

## 2022-03-26 RX ADMIN — INSULIN LISPRO 6 UNITS: 100 INJECTION, SOLUTION INTRAVENOUS; SUBCUTANEOUS at 16:10

## 2022-03-26 RX ADMIN — OMEGA-3 FATTY ACIDS CAP 1000 MG 2000 MG: 1000 CAP at 17:23

## 2022-03-26 RX ADMIN — PREDNISONE 50 MG: 20 TABLET ORAL at 08:27

## 2022-03-26 RX ADMIN — ALPRAZOLAM 2 MG: 0.5 TABLET ORAL at 21:16

## 2022-03-26 RX ADMIN — TRAMADOL HYDROCHLORIDE 50 MG: 50 TABLET, COATED ORAL at 14:05

## 2022-03-26 RX ADMIN — DICLOFENAC SODIUM TOPICAL GEL, 1%, 2 G: 10 GEL TOPICAL at 08:33

## 2022-03-26 RX ADMIN — IPRATROPIUM BROMIDE AND ALBUTEROL SULFATE 3 ML: 2.5; .5 SOLUTION RESPIRATORY (INHALATION) at 20:43

## 2022-03-26 RX ADMIN — IPRATROPIUM BROMIDE AND ALBUTEROL SULFATE 3 ML: 2.5; .5 SOLUTION RESPIRATORY (INHALATION) at 14:12

## 2022-03-26 RX ADMIN — PANTOPRAZOLE SODIUM 40 MG: 40 TABLET, DELAYED RELEASE ORAL at 05:58

## 2022-03-26 NOTE — PROGRESS NOTES
[facility] Progress Note  Patient: Rubina Travis 57 y o  male   MRN: [id number]  PCP: Roselyn Bass MD  Unit/Bed#: [address] [place] [address] Encounter: [id number]  Date Of Visit: 03/26/22    Problem List:    Principal Problem:    COPD with exacerbation ([facility] )  Active Problems:    Uncontrolled type 2 diabetes mellitus with hyperglycemia ([place] [address] )    SHAVONNE and COPD overlap syndrome ([place] [address] )    Coronary artery disease involving native heart    Chronic respiratory failure with hypoxia ([place] [address] )    Stage 2 chronic kidney disease    Chronic a-fib (HCC)    Chronic congestive heart failure (HCC)    Chronic left-sided low back pain with left-sided sciatica    Bipolar affective disorder ([facility])    Benign essential hypertension    Peripheral neuropathy    Dizziness      Assessment & Plan:    * COPD with exacerbation ([place] [address] )  Assessment & Plan  Reported increasing shortness of breath gradually progressive over last few weeks with associated cough, noted increased purulence over last 2 days  Referred to ED with increasing shortness of breath, hypoxia  Chest x-ray without any acute abnormality  · Home regimen: Trelegy & albuterol neb q6h prn  · IP regimen: Breo & duo neb tid  · Transition to po prednisone today  · Sputum culture not yet collected  · Procalcitonin nl  · Chest PT  · Activity as tolerated    Metabolic encephalopathy-resolved as of 3/25/2022  Assessment & Plan  Patient reported increasing fatigue and lethargy  Noted to be somnolent on presentation  CT head unremarkable    ABG without any hypercarbia  Patient improved spontaneously after presentation  Possibly hypoxia or medications related, patient denies any new medication or excessive use of sedatives/opiates  · Continue supplemental oxygen  · Monitor mental status    Chronic left-sided low back pain with left-sided sciatica  Assessment & Plan  · Follows up with Pain Management  · Continue Tylenol, diclofenac topical gel, gabapentin  · PT/OT - rehab    Chronic congestive heart failure [facility])  Assessment & Plan  Wt Readings from Last 3 Encounters:   03/26/22 127 kg (280 lb)   02/07/22 (!) 137 kg (301 lb)   01/11/22 136 kg (300 lb)     Chest x-ray without any evidence of CHF  ProBNP low at 183  No evidence of volume overload clinically  Continue metoprolol, digoxin, spironolactone, torsemide  BMP noted   Fluid restriction        Chronic a-fib (HCC)  Assessment & Plan  Controlled  Continue metoprolol, digoxin, Eliquis    Stage 2 chronic kidney disease  Assessment & Plan  BUN   Date Value Ref Range Status   03/26/2022 20 5 - 25 mg/dL Final   03/25/2022 25 5 - 25 mg/dL Final   03/24/2022 29 (H) 5 - 25 mg/dL Final     Creatinine   Date Value Ref Range Status   03/26/2022 1 03 0 60 - 1 30 mg/dL Final     Comment:     Standardized to IDMS reference method   03/25/2022 0 93 0 60 - 1 30 mg/dL Final     Comment:     Standardized to IDMS reference method   03/24/2022 1 07 0 60 - 1 30 mg/dL Final     Comment:     Standardized to IDMS reference method       Chronic respiratory failure with hypoxia (HCC)  Assessment & Plan  On 2-3 L of supplemental oxygen at baseline    Coronary artery disease involving native heart  Assessment & Plan  Continue aspirin, metoprolol, statin    SHAVONNE and COPD overlap syndrome ([facility] )  Assessment & Plan  On BiPAP 12/5  Reports compliance    Uncontrolled type 2 diabetes mellitus with hyperglycemia [facility])  Assessment & Plan  Lab Results   Component Value Date    HGBA1C 8 6 (H) 03/25/2022       Recent Labs     03/26/22  0706 03/26/22  1047 03/26/22  1603 03/26/22 2029   POCGLU 136 287* 273* 301*       Blood Sugar Average: Last 72 hrs:  (P) 294 25     · Home regimen : Lantus 75 units b i d   With pre meal insulin 35 units Q a c   · Continue home regimen  · Monitor on steroids    Peripheral neuropathy  Assessment & Plan  Continue gabapentin    Benign essential hypertension  Assessment & Plan  Stable  Monitor on metoprolol , losartan, spironolactone    Bipolar affective disorder [facility])  Assessment & Plan  Continue Zoloft, Seroquel, Xanax    Dizziness  Assessment & Plan  · Orthostatics negative  · Postural dizziness likely from autonomic dysfunction from uncontrolled diabetes  · Exam is non-focal          VTE Pharmacologic Prophylaxis: VTE Score: 4 Moderate Risk (Score 3-4) - Pharmacological DVT Prophylaxis Ordered: apixaban (Eliquis)  Patient Centered Rounds: I performed bedside rounds with nursing staff today  Discussions with Specialists or Other Care Team Provider:  Yes    Education and Discussions with Family / Patient: Discussed with patient  Time Spent for Care: 45 minutes  More than 50% of total time spent on counseling and coordination of care as described above  Current Length of Stay: 2 day(s)  Current Patient Status: Inpatient   Certification Statement: The patient will continue to require additional inpatient hospital stay due to COPD exacerbation,  Discharge Plan: Anticipate discharge in 24-48 hrs to rehab facility  pending authorization    Code Status: Level 1 - Full Code    Subjective:   Breathing is improving, close to baseline   Reports chronic back pain    Reports good urine output    Objective:     Vitals:   Temp (24hrs), Av 8 °F (36 6 °C), Min:96 9 °F (36 1 °C), Max:98 4 °F (36 9 °C)    Temp:  [96 9 °F (36 1 °C)-98 4 °F (36 9 °C)] 97 9 °F (36 6 °C)  HR:  [69-84] 84  Resp:  [17-19] 18  BP: (101-129)/(50-83) 126/79  SpO2:  [95 %-98 %] 95 %  Body mass index is 39 05 kg/m²  Input and Output Summary (last 24 hours): Intake/Output Summary (Last 24 hours) at 3/26/2022 1832  Last data filed at 3/26/2022 0900  Gross per 24 hour   Intake 460 ml   Output 1050 ml   Net -590 ml       Physical Exam:   Physical Exam  Constitutional:       General: He is not in acute distress  Appearance: He is ill-appearing (Chronically)  HENT:      Head: Normocephalic and atraumatic  Cardiovascular:      Rate and Rhythm: Normal rate     Pulmonary:      Effort: Pulmonary effort is normal  No respiratory distress  Breath sounds: No wheezing, rhonchi or rales  Comments: Diminished bilaterally   Coarse breath sounds  Chest:      Chest wall: No tenderness  Abdominal:      General: Bowel sounds are normal  There is no distension  Palpations: Abdomen is soft  Tenderness: There is no abdominal tenderness  There is no guarding or rebound  Musculoskeletal:      Right lower leg: No edema  Left lower leg: No edema  Comments: Lower extremity edema improving   Skin:     General: Skin is warm and dry  Findings: No rash  Neurological:      Mental Status: He is alert  Mental status is at baseline  Cranial Nerves: No cranial nerve deficit  Additional Data:     Labs:  Results from last 7 days   Lab Units 03/25/22  0612 03/24/22  1044 03/24/22  1044   WBC Thousand/uL 17 36*   < > 15 11*   HEMOGLOBIN g/dL 13 1   < > 14 3   HEMATOCRIT % 38 8   < > 41 5   PLATELETS Thousands/uL 180   < > 187   LYMPHO PCT %  --   --  30   MONO PCT %  --   --  4   EOS PCT %  --   --  0    < > = values in this interval not displayed  Results from last 7 days   Lab Units 03/26/22  1006 03/24/22  2152 03/24/22  1044   SODIUM mmol/L 129*   < > 135*   POTASSIUM mmol/L 3 2*   < > 3 8   CHLORIDE mmol/L 91*   < > 99*   CO2 mmol/L 26   < > 26   BUN mg/dL 20   < > 29*   CREATININE mg/dL 1 03   < > 1 07   ANION GAP mmol/L 12   < > 10   CALCIUM mg/dL 7 8*   < > 8 8   ALBUMIN g/dL  --   --  3 9   TOTAL BILIRUBIN mg/dL  --   --  0 40   ALK PHOS U/L  --   --  87   ALT U/L  --   --  45   AST U/L  --   --  26   GLUCOSE RANDOM mg/dL 287*   < > 209*    < > = values in this interval not displayed           Results from last 7 days   Lab Units 03/26/22  1603 03/26/22  1047 03/26/22  0706 03/26/22  0212 03/25/22  2038 03/25/22  1602 03/25/22  1100 03/25/22  0735 03/25/22  0207 03/24/22  2336 03/24/22  2104 03/24/22  1553   POC GLUCOSE mg/dl 273* 287* 136 158* 270* 323* 371* 394* 402* 475* >500* 141*     Results from last 7 days   Lab Units 03/25/22  0612   HEMOGLOBIN A1C % 8 6*     Results from last 7 days   Lab Units 03/25/22  0612 03/24/22 2049   PROCALCITONIN ng/ml 0 09 0 05       Lines/Drains:  Invasive Devices  Report    Peripheral Intravenous Line            Peripheral IV 03/24/22 Left Antecubital 2 days                      Imaging: Reviewed radiology reports from this admission including: chest xray    Recent Cultures (last 7 days):         Last 24 Hours Medication List:   Current Facility-Administered Medications   Medication Dose Route Frequency Provider Last Rate    acetaminophen  650 mg Oral Q4H PRN Wesley Don MD      ALPRAZolam  2 mg Oral HS Wesley Don MD      apixaban  5 mg Oral BID Wesley Don MD      vitamin C  1,000 mg Oral Daily Wesley Don MD      aspirin  81 mg Oral Daily Wesley Don MD      atorvastatin  80 mg Oral Daily Wesley Don MD      busPIRone  10 mg Oral BID Wesley Don MD      cholecalciferol  1,000 Units Oral Daily Wesley Don MD      Diclofenac Sodium  2 g Topical 4x Daily PRN Wesley Don MD      digoxin  250 mcg Oral Daily Wesley Don MD      fish oil  2,000 mg Oral BID Wesley Don MD      fluticasone-vilanterol  1 puff Inhalation Daily Wesley Don MD      gabapentin  300 mg Oral BID Wesley Don MD      guaiFENesin  600 mg Oral Q12H Michelle Fleming MD      insulin glargine  75 Units Subcutaneous Q12H [address] Wesley Don MD      insulin lispro  1-5 Units Subcutaneous HS Wesley Don MD      insulin lispro  1-5 Units Subcutaneous 0200 Wesley Don MD      insulin lispro  2-12 Units Subcutaneous TID AC Wesley Don MD      insulin lispro  35 Units Subcutaneous TID With Meals Wesley Don MD      ipratropium-albuterol  3 mL Nebulization Q6H Wesley Don MD      ipratropium-albuterol  3 mL Nebulization Q4H PRN Wesley Don MD      losartan  50 mg Oral Daily Amanda Grider MD      metoprolol succinate  200 mg Oral Daily Amanda Grider MD      multivitamin-minerals  1 tablet Oral Daily Amanda Grider MD      pantoprazole  40 mg Oral Early Morning Amanda Grider MD      predniSONE  50 mg Oral Daily Jayleen Fernando PA-C      Followed by   Viky Ours ON 3/29/2022] predniSONE  40 mg Oral Daily YUE Phelps-C      Followed by   Viky Ours ON 4/1/2022] predniSONE  30 mg Oral Daily Jayleentatum Fernando PA-C      Followed by   Viky Ours ON 4/4/2022] predniSONE  20 mg Oral Daily Jayleentatum Fernando PA-C      Followed by   Viky Ours ON 4/5/2022] predniSONE  10 mg Oral Daily DAMIR PhelpsC      QUEtiapine  100 mg Oral QAM Amanda Grider MD      QUEtiapine  300 mg Oral HS Amanda Grider MD      sertraline  50 mg Oral Daily Amanda Grider MD      spironolactone  25 mg Oral Daily Amanda Grider MD      torsemide  40 mg Oral Daily Amanda Grider MD      traMADol  50 mg Oral Q6H PRN Amanda Grider MD          Today, Patient Was Seen By: Amanda Grider MD    ** Please Note: "This note has been constructed using a voice recognition system  Therefore there may be syntax, spelling, and/or grammatical errors   Please call if you have any questions  "**

## 2022-03-27 PROBLEM — R42 DIZZINESS: Status: RESOLVED | Noted: 2020-10-11 | Resolved: 2022-03-27

## 2022-03-27 LAB
ANION GAP SERPL CALCULATED.3IONS-SCNC: 6 MMOL/L (ref 4–13)
BUN SERPL-MCNC: 15 MG/DL (ref 5–25)
CALCIUM SERPL-MCNC: 8.4 MG/DL (ref 8.3–10.1)
CHLORIDE SERPL-SCNC: 96 MMOL/L (ref 100–108)
CO2 SERPL-SCNC: 30 MMOL/L (ref 21–32)
CREAT SERPL-MCNC: 0.86 MG/DL (ref 0.6–1.3)
GFR SERPL CREATININE-BSD FRML MDRD: 92 ML/MIN/1.73SQ M
GLUCOSE SERPL-MCNC: 125 MG/DL (ref 65–140)
GLUCOSE SERPL-MCNC: 130 MG/DL (ref 65–140)
GLUCOSE SERPL-MCNC: 171 MG/DL (ref 65–140)
GLUCOSE SERPL-MCNC: 258 MG/DL (ref 65–140)
GLUCOSE SERPL-MCNC: 272 MG/DL (ref 65–140)
GLUCOSE SERPL-MCNC: 355 MG/DL (ref 65–140)
POTASSIUM SERPL-SCNC: 3.3 MMOL/L (ref 3.5–5.3)
QRS AXIS: 65 DEGREES
QRSD INTERVAL: 94 MS
QT INTERVAL: 358 MS
QTC INTERVAL: 423 MS
SODIUM SERPL-SCNC: 132 MMOL/L (ref 136–145)
T WAVE AXIS: 13 DEGREES
VENTRICULAR RATE: 84 BPM

## 2022-03-27 PROCEDURE — 94640 AIRWAY INHALATION TREATMENT: CPT

## 2022-03-27 PROCEDURE — 99232 SBSQ HOSP IP/OBS MODERATE 35: CPT | Performed by: INTERNAL MEDICINE

## 2022-03-27 PROCEDURE — 80048 BASIC METABOLIC PNL TOTAL CA: CPT | Performed by: INTERNAL MEDICINE

## 2022-03-27 PROCEDURE — 82948 REAGENT STRIP/BLOOD GLUCOSE: CPT

## 2022-03-27 PROCEDURE — 94760 N-INVAS EAR/PLS OXIMETRY 1: CPT

## 2022-03-27 PROCEDURE — 93010 ELECTROCARDIOGRAM REPORT: CPT | Performed by: INTERNAL MEDICINE

## 2022-03-27 PROCEDURE — 94660 CPAP INITIATION&MGMT: CPT

## 2022-03-27 RX ORDER — POTASSIUM CHLORIDE 20 MEQ/1
20 TABLET, EXTENDED RELEASE ORAL ONCE
Status: COMPLETED | OUTPATIENT
Start: 2022-03-27 | End: 2022-03-27

## 2022-03-27 RX ADMIN — BUSPIRONE HYDROCHLORIDE 10 MG: 10 TABLET ORAL at 18:57

## 2022-03-27 RX ADMIN — POTASSIUM CHLORIDE 20 MEQ: 1500 TABLET, EXTENDED RELEASE ORAL at 10:41

## 2022-03-27 RX ADMIN — TORSEMIDE 40 MG: 20 TABLET ORAL at 09:03

## 2022-03-27 RX ADMIN — IPRATROPIUM BROMIDE AND ALBUTEROL SULFATE 3 ML: 2.5; .5 SOLUTION RESPIRATORY (INHALATION) at 13:39

## 2022-03-27 RX ADMIN — IPRATROPIUM BROMIDE AND ALBUTEROL SULFATE 3 ML: 2.5; .5 SOLUTION RESPIRATORY (INHALATION) at 19:45

## 2022-03-27 RX ADMIN — QUETIAPINE FUMARATE 100 MG: 50 TABLET, EXTENDED RELEASE ORAL at 09:05

## 2022-03-27 RX ADMIN — DICLOFENAC SODIUM TOPICAL GEL, 1%, 2 G: 10 GEL TOPICAL at 16:51

## 2022-03-27 RX ADMIN — METOPROLOL SUCCINATE 200 MG: 100 TABLET, EXTENDED RELEASE ORAL at 09:01

## 2022-03-27 RX ADMIN — SERTRALINE HYDROCHLORIDE 50 MG: 50 TABLET ORAL at 09:03

## 2022-03-27 RX ADMIN — Medication 1 TABLET: at 09:03

## 2022-03-27 RX ADMIN — TRAMADOL HYDROCHLORIDE 50 MG: 50 TABLET, COATED ORAL at 13:16

## 2022-03-27 RX ADMIN — IPRATROPIUM BROMIDE AND ALBUTEROL SULFATE 3 ML: 2.5; .5 SOLUTION RESPIRATORY (INHALATION) at 03:08

## 2022-03-27 RX ADMIN — GABAPENTIN 300 MG: 300 CAPSULE ORAL at 09:03

## 2022-03-27 RX ADMIN — ALPRAZOLAM 2 MG: 0.5 TABLET ORAL at 22:00

## 2022-03-27 RX ADMIN — APIXABAN 5 MG: 5 TABLET, FILM COATED ORAL at 18:57

## 2022-03-27 RX ADMIN — LOSARTAN POTASSIUM 50 MG: 50 TABLET, FILM COATED ORAL at 09:03

## 2022-03-27 RX ADMIN — FLUTICASONE FUROATE AND VILANTEROL TRIFENATATE 1 PUFF: 100; 25 POWDER RESPIRATORY (INHALATION) at 09:05

## 2022-03-27 RX ADMIN — Medication 1000 UNITS: at 09:03

## 2022-03-27 RX ADMIN — QUETIAPINE FUMARATE 300 MG: 100 TABLET ORAL at 22:00

## 2022-03-27 RX ADMIN — GABAPENTIN 300 MG: 300 CAPSULE ORAL at 18:57

## 2022-03-27 RX ADMIN — ACETAMINOPHEN 650 MG: 325 TABLET, FILM COATED ORAL at 16:48

## 2022-03-27 RX ADMIN — BUSPIRONE HYDROCHLORIDE 10 MG: 10 TABLET ORAL at 09:03

## 2022-03-27 RX ADMIN — ACETAMINOPHEN 650 MG: 325 TABLET, FILM COATED ORAL at 09:21

## 2022-03-27 RX ADMIN — IPRATROPIUM BROMIDE AND ALBUTEROL SULFATE 3 ML: 2.5; .5 SOLUTION RESPIRATORY (INHALATION) at 07:15

## 2022-03-27 RX ADMIN — PANTOPRAZOLE SODIUM 40 MG: 40 TABLET, DELAYED RELEASE ORAL at 05:04

## 2022-03-27 RX ADMIN — ATORVASTATIN CALCIUM 80 MG: 80 TABLET, FILM COATED ORAL at 09:03

## 2022-03-27 RX ADMIN — SPIRONOLACTONE 25 MG: 25 TABLET ORAL at 09:03

## 2022-03-27 RX ADMIN — ACETAMINOPHEN 650 MG: 325 TABLET, FILM COATED ORAL at 22:02

## 2022-03-27 RX ADMIN — INSULIN LISPRO 6 UNITS: 100 INJECTION, SOLUTION INTRAVENOUS; SUBCUTANEOUS at 12:06

## 2022-03-27 RX ADMIN — OMEGA-3 FATTY ACIDS CAP 1000 MG 2000 MG: 1000 CAP at 09:03

## 2022-03-27 RX ADMIN — APIXABAN 5 MG: 5 TABLET, FILM COATED ORAL at 09:03

## 2022-03-27 RX ADMIN — ASPIRIN 81 MG: 81 TABLET, COATED ORAL at 09:01

## 2022-03-27 RX ADMIN — GUAIFENESIN 600 MG: 600 TABLET, EXTENDED RELEASE ORAL at 20:03

## 2022-03-27 RX ADMIN — OMEGA-3 FATTY ACIDS CAP 1000 MG 2000 MG: 1000 CAP at 18:57

## 2022-03-27 RX ADMIN — INSULIN LISPRO 10 UNITS: 100 INJECTION, SOLUTION INTRAVENOUS; SUBCUTANEOUS at 16:45

## 2022-03-27 RX ADMIN — DIGOXIN 250 MCG: 125 TABLET ORAL at 09:01

## 2022-03-27 RX ADMIN — INSULIN LISPRO 2 UNITS: 100 INJECTION, SOLUTION INTRAVENOUS; SUBCUTANEOUS at 21:59

## 2022-03-27 RX ADMIN — INSULIN GLARGINE 75 UNITS: 100 INJECTION, SOLUTION SUBCUTANEOUS at 21:58

## 2022-03-27 RX ADMIN — PREDNISONE 50 MG: 20 TABLET ORAL at 09:01

## 2022-03-27 RX ADMIN — DICLOFENAC SODIUM TOPICAL GEL, 1%, 2 G: 10 GEL TOPICAL at 05:06

## 2022-03-27 RX ADMIN — TRAMADOL HYDROCHLORIDE 50 MG: 50 TABLET, COATED ORAL at 05:03

## 2022-03-27 RX ADMIN — GUAIFENESIN 600 MG: 600 TABLET, EXTENDED RELEASE ORAL at 09:03

## 2022-03-27 RX ADMIN — OXYCODONE HYDROCHLORIDE AND ACETAMINOPHEN 1000 MG: 500 TABLET ORAL at 09:03

## 2022-03-27 RX ADMIN — INSULIN GLARGINE 75 UNITS: 100 INJECTION, SOLUTION SUBCUTANEOUS at 08:58

## 2022-03-27 RX ADMIN — TRAMADOL HYDROCHLORIDE 50 MG: 50 TABLET, COATED ORAL at 20:03

## 2022-03-27 NOTE — PROGRESS NOTES
Phan 44  Progress Note [place] 1960, 57 y o  male MRN: [id number]  Unit/Bed#: [address] [place] [address] Encounter: [id number]  Primary Care Provider: Nola Billy MD   Date and time admitted to hospital: 3/24/2022 10:29 AM    * COPD with exacerbation ([address] )  Assessment & Plan  Reported increasing shortness of breath gradually progressive over last few weeks with associated cough, noted increased purulence over last 2 days  Referred to ED with increasing shortness of breath, hypoxia  Chest x-ray without any acute abnormality  · Home regimen: Trelegy & albuterol neb q6h prn  · IP regimen: Breo & duo neb tid  · Transitioned to po prednisone taper  · Sputum culture not yet collected  · Procalcitonin nl  · Chest PT  · Activity as tolerated  · Pending authorization for short term rehab placement     Chronic left-sided low back pain with left-sided sciatica  Assessment & Plan  · Follows up with Pain Management  · Continue Tylenol, diclofenac topical gel, gabapentin  · PT/OT - rehab    Peripheral neuropathy  Assessment & Plan  Continue gabapentin    Chronic congestive heart failure (HCC)  Assessment & Plan  Wt Readings from Last 3 Encounters:   03/26/22 127 kg (280 lb)   02/07/22 (!) 137 kg (301 lb)   01/11/22 136 kg (300 lb)     Chest x-ray without any evidence of CHF    ProBNP low at 183  No evidence of volume overload clinically  Continue metoprolol, digoxin, spironolactone, torsemide  BMP noted   Fluid restriction        Chronic a-fib [facility])  Assessment & Plan  Controlled  Continue metoprolol, digoxin, Eliquis    Stage 2 chronic kidney disease  Assessment & Plan  BUN   Date Value Ref Range Status   03/26/2022 20 5 - 25 mg/dL Final   03/25/2022 25 5 - 25 mg/dL Final   03/24/2022 29 (H) 5 - 25 mg/dL Final     Creatinine   Date Value Ref Range Status   03/26/2022 1 03 0 60 - 1 30 mg/dL Final     Comment:     Standardized to IDMS reference method   03/25/2022 0 93 0 60 - 1 30 mg/dL Final Comment:     Standardized to IDMS reference method   03/24/2022 1 07 0 60 - 1 30 mg/dL Final     Comment:     Standardized to IDMS reference method       Chronic respiratory failure with hypoxia (HCC)  Assessment & Plan  On 2-3 L of supplemental oxygen at baseline    Benign essential hypertension  Assessment & Plan  Stable  Monitor on metoprolol , losartan, spironolactone    Coronary artery disease involving native heart  Assessment & Plan  Continue aspirin, metoprolol, statin    SHAVONNE and COPD overlap syndrome ([address] )  Assessment & Plan  On BiPAP 12/5  Reports compliance    Uncontrolled type 2 diabetes mellitus with hyperglycemia [facility])  Assessment & Plan  Lab Results   Component Value Date    HGBA1C 8 6 (H) 03/25/2022       Recent Labs     03/26/22  0706 03/26/22  1047 03/26/22  1603 03/26/22 2029   POCGLU 136 287* 273* 301*       Blood Sugar Average: Last 72 hrs:  (P) 294 25     · Home regimen : Lantus 75 units b i d  With pre meal insulin 35 units Q a c   · Continue home regimen  · Monitor on steroids    Bipolar affective disorder [facility])  Assessment & Plan  Continue Zoloft, Seroquel, Xanax    Metabolic encephalopathy-resolved as of 3/25/2022  Assessment & Plan  Patient reported increasing fatigue and lethargy  Noted to be somnolent on presentation  CT head unremarkable  ABG without any hypercarbia  Patient improved spontaneously after presentation  Possibly hypoxia or medications related, patient denies any new medication or excessive use of sedatives/opiates  · Continue supplemental oxygen  · Monitor mental status    Dizziness-resolved as of 3/27/2022  Assessment & Plan  · Orthostatics negative  · Postural dizziness likely from autonomic dysfunction from uncontrolled diabetes  · Exam is non-focal          VTE Pharmacologic Prophylaxis: VTE Score: 4 Moderate Risk (Score 3-4) - Pharmacological DVT Prophylaxis Ordered: apixaban (Eliquis)      Patient Centered Rounds: I performed bedside rounds with nursing staff today   Discussions with Specialists or Other Care Team Provider: KENDAL,RN     Education and Discussions with Family / Patient: Patient declined call to   Time Spent for Care: 30 minutes  More than 50% of total time spent on counseling and coordination of care as described above  Current Length of Stay: 3 day(s)  Current Patient Status: Inpatient   Certification Statement: The patient will continue to require additional inpatient hospital stay due to pending auth for STR placement   Discharge Plan: Anticipate discharge in 24-48 hrs to rehab facility  Code Status: Level 1 - Full Code    Subjective:   Patient was sitting up in the chair comfortably with no complaints at this time  States breathing is better, denies any wheezing or cough  Objective:     Vitals:   Temp (24hrs), Av °F (36 7 °C), Min:96 6 °F (35 9 °C), Max:98 7 °F (37 1 °C)    Temp:  [96 6 °F (35 9 °C)-98 7 °F (37 1 °C)] 96 6 °F (35 9 °C)  HR:  [63-92] 84  Resp:  [18-19] 19  BP: (101-127)/(50-79) 120/63  SpO2:  [91 %-96 %] 94 %  Body mass index is 39 48 kg/m²  Input and Output Summary (last 24 hours): Intake/Output Summary (Last 24 hours) at 3/27/2022 1204  Last data filed at 3/27/2022 1001  Gross per 24 hour   Intake 600 ml   Output 4125 ml   Net -3525 ml       Physical Exam:   Physical Exam  Constitutional:       General: He is not in acute distress  HENT:      Mouth/Throat:      Mouth: Mucous membranes are moist    Eyes:      General: No scleral icterus  Cardiovascular:      Rate and Rhythm: Normal rate and regular rhythm  Heart sounds: Normal heart sounds  Pulmonary:      Breath sounds: Normal breath sounds  No wheezing, rhonchi or rales  Abdominal:      General: Abdomen is flat  Bowel sounds are normal       Palpations: Abdomen is soft  Musculoskeletal:      Right lower leg: No edema  Left lower leg: No edema  Skin:     General: Skin is warm  Neurological:      Mental Status: He is alert  Mental status is at baseline  Psychiatric:         Mood and Affect: Mood normal           Additional Data:     Labs:  Results from last 7 days   Lab Units 03/25/22  0612 03/24/22  1044 03/24/22  1044   WBC Thousand/uL 17 36*   < > 15 11*   HEMOGLOBIN g/dL 13 1   < > 14 3   HEMATOCRIT % 38 8   < > 41 5   PLATELETS Thousands/uL 180   < > 187   LYMPHO PCT %  --   --  30   MONO PCT %  --   --  4   EOS PCT %  --   --  0    < > = values in this interval not displayed  Results from last 7 days   Lab Units 03/27/22  0531 03/24/22  2152 03/24/22  1044   SODIUM mmol/L 132*   < > 135*   POTASSIUM mmol/L 3 3*   < > 3 8   CHLORIDE mmol/L 96*   < > 99*   CO2 mmol/L 30   < > 26   BUN mg/dL 15   < > 29*   CREATININE mg/dL 0 86   < > 1 07   ANION GAP mmol/L 6   < > 10   CALCIUM mg/dL 8 4   < > 8 8   ALBUMIN g/dL  --   --  3 9   TOTAL BILIRUBIN mg/dL  --   --  0 40   ALK PHOS U/L  --   --  87   ALT U/L  --   --  45   AST U/L  --   --  26   GLUCOSE RANDOM mg/dL 171*   < > 209*    < > = values in this interval not displayed  Results from last 7 days   Lab Units 03/27/22  1058 03/27/22  0717 03/27/22  0215 03/26/22  2029 03/26/22  1603 03/26/22  1047 03/26/22  0706 03/26/22  0212 03/25/22  2038 03/25/22  1602 03/25/22  1100 03/25/22  0735   POC GLUCOSE mg/dl 258* 130 125 301* 273* 287* 136 158* 270* 323* 371* 394*     Results from last 7 days   Lab Units 03/25/22  0612   HEMOGLOBIN A1C % 8 6*     Results from last 7 days   Lab Units 03/25/22  0612 03/24/22  2049   PROCALCITONIN ng/ml 0 09 0 05       Lines/Drains:  Invasive Devices  Report    Peripheral Intravenous Line            Peripheral IV 03/24/22 Left Antecubital 3 days                      Imaging: No pertinent imaging reviewed      Recent Cultures (last 7 days):         Last 24 Hours Medication List:   Current Facility-Administered Medications   Medication Dose Route Frequency Provider Last Rate    acetaminophen  650 mg Oral Q4H PRN MD Roge Franco ALPRAZolam  2 mg Oral HS Freddy Mathews, MD      apixaban  5 mg Oral BID Freddy Mathews, MD      vitamin C  1,000 mg Oral Daily Freddy Mathews, MD      aspirin  81 mg Oral Daily Freddy Mathews, MD      atorvastatin  80 mg Oral Daily Freddy Mathews, MD      busPIRone  10 mg Oral BID Freddy Mathews, MD      cholecalciferol  1,000 Units Oral Daily Freddy Mathews, MD      Diclofenac Sodium  2 g Topical 4x Daily PRN Freddy Mathews MD      digoxin  250 mcg Oral Daily Freddy Mathews, MD      fish oil  2,000 mg Oral BID Freddy Mathews, MD      fluticasone-vilanterol  1 puff Inhalation Daily Freddy Mathews MD      gabapentin  300 mg Oral BID Freddy Mathews, MD      guaiFENesin  600 mg Oral Q12H Lizy Asher MD      insulin glargine  75 Units Subcutaneous Q12H [address] Freddy Mathews MD      insulin lispro  1-5 Units Subcutaneous HS Freddy Mathews MD      insulin lispro  1-5 Units Subcutaneous 0200 Freddy Mathews MD      insulin lispro  2-12 Units Subcutaneous TID AC Freddy Mathews MD      insulin lispro  35 Units Subcutaneous TID With Meals Freddy Mathews MD      ipratropium-albuterol  3 mL Nebulization Q6H Freddy Mathews MD      ipratropium-albuterol  3 mL Nebulization Q4H PRN Freddy Mathews MD      losartan  50 mg Oral Daily Freddy Mathews MD      metoprolol succinate  200 mg Oral Daily Freddy Mathews MD      multivitamin-minerals  1 tablet Oral Daily Freddy Mathews MD      pantoprazole  40 mg Oral Early Morning Freddy Mathews MD      predniSONE  50 mg Oral Daily Jayleen Fernando PA-C      Followed by   Natasha Hemphill ON 3/29/2022] predniSONE  40 mg Oral Daily Jayleen Fernando PA-C      Followed by   Natasha Hemphill ON 4/1/2022] predniSONE  30 mg Oral Daily Jayleen Fernando PA-C      Followed by   Natasha Hemphill ON 4/4/2022] predniSONE  20 mg Oral Daily Jayleen Fernando PA-C      Followed by   Natasha Hemphill ON 4/5/2022] predniSONE  10 mg Oral Daily Jayleen Fernando PA-C      QUEtiapine  100 mg Oral [facility] Gene Garcia MD      QUEtiapine  300 mg Oral HS Lizbeth Jean, MD      sertraline  50 mg Oral Daily Lizbeth Jean, MD      spironolactone  25 mg Oral Daily Lizbeth Jean, MD      torsemide  40 mg Oral Daily Lizbeth Jean, MD      traMADol  50 mg Oral Q6H PRN Lizbeth Jean, MD          Today, Patient Was Seen By: Veronica Bustamante PA-C    **Please Note: This note may have been constructed using a voice recognition system  **

## 2022-03-27 NOTE — ASSESSMENT & PLAN NOTE
Wt Readings from Last 3 Encounters:   03/26/22 127 kg (280 lb)   02/07/22 (!) 137 kg (301 lb)   01/11/22 136 kg (300 lb)     Chest x-ray without any evidence of CHF    ProBNP low at 183  No evidence of volume overload clinically  Continue metoprolol, digoxin, spironolactone, torsemide  BMP noted   Fluid restriction

## 2022-03-27 NOTE — PLAN OF CARE
Problem: PAIN - ADULT  Goal: Verbalizes/displays adequate comfort level or baseline comfort level  Description: Interventions:  - Encourage patient to monitor pain and request assistance  - Assess pain using appropriate pain scale  - Administer analgesics based on type and severity of pain and evaluate response  - Implement non-pharmacological measures as appropriate and evaluate response  - Consider cultural and social influences on pain and pain management  - Notify physician/advanced practitioner if interventions unsuccessful or patient reports new pain  Outcome: Progressing     Problem: RESPIRATORY - ADULT  Goal: Achieves optimal ventilation and oxygenation  Description: INTERVENTIONS:  - Assess for changes in respiratory status  - Assess for changes in mentation and behavior  - Position to facilitate oxygenation and minimize respiratory effort  - Oxygen administered by appropriate delivery if ordered  - Initiate smoking cessation education as indicated  - Encourage broncho-pulmonary hygiene including cough, deep breathe, Incentive Spirometry  - Assess the need for suctioning and aspirate as needed  - Assess and instruct to report SOB or any respiratory difficulty  - Respiratory Therapy support as indicated  Outcome: Progressing     Problem: MOBILITY - ADULT  Goal: Maintain or return to baseline ADL function  Description: INTERVENTIONS:  -  Assess patient's ability to carry out ADLs; assess patient's baseline for ADL function and identify physical deficits which impact ability to perform ADLs (bathing, care of mouth/teeth, toileting, grooming, dressing, etc )  - Assess/evaluate cause of self-care deficits   - Assess range of motion  - Assess patient's mobility; develop plan if impaired  - Assess patient's need for assistive devices and provide as appropriate  - Encourage maximum independence but intervene and supervise when necessary  - Involve family in performance of ADLs  - Assess for home care needs following discharge   - Consider OT consult to assist with ADL evaluation and planning for discharge  - Provide patient education as appropriate  Outcome: Progressing  Goal: Maintains/Returns to pre admission functional level  Description: INTERVENTIONS:  - Perform BMAT or MOVE assessment daily    - Set and communicate daily mobility goal to care team and patient/family/caregiver     - Collaborate with rehabilitation services on mobility goals if consulted        - Ambulate patient 3 times a day  - Out of bed to chair 3 times a day   - Out of bed for meals 3 times a day  - Out of bed for toileting  - Record patient progress and toleration of activity level   Outcome: Progressing     Problem: Prexisting or High Potential for Compromised Skin Integrity  Goal: Skin integrity is maintained or improved  Description: INTERVENTIONS:  - Identify patients at risk for skin breakdown  - Assess and monitor skin integrity  - Assess and monitor nutrition and hydration status  - Monitor labs   - Assess for incontinence   - Turn and reposition patient  - Assist with mobility/ambulation  - Relieve pressure over bony prominences  - Avoid friction and shearing  - Provide appropriate hygiene as needed including keeping skin clean and dry  - Evaluate need for skin moisturizer/barrier cream  - Collaborate with interdisciplinary team   - Patient/family teaching  - Consider wound care consult   Outcome: Progressing     Problem: Potential for Falls  Goal: Patient will remain free of falls  Description: INTERVENTIONS:  - Educate patient/family on patient safety including physical limitations  - Instruct patient to call for assistance with activity   - Consult OT/PT to assist with strengthening/mobility   - Keep Call bell within reach  - Keep bed low and locked with side rails adjusted as appropriate  - Keep care items and personal belongings within reach  - Initiate and maintain comfort rounds  - Make Fall Risk Sign visible to staff  - Offer Toileting every 2 Hours, in advance of need  - Initiate/Maintain bed alarm  - Obtain necessary fall risk management equipment: bed alarm, yellow socks   - Apply yellow socks and bracelet for high fall risk patients  - Consider moving patient to room near nurses station  Outcome: Progressing

## 2022-03-27 NOTE — ASSESSMENT & PLAN NOTE
Lab Results   Component Value Date    HGBA1C 8 6 (H) 03/25/2022       Recent Labs     03/26/22  0706 03/26/22  1047 03/26/22  1603 03/26/22 2029   POCGLU 136 287* 273* 301*       Blood Sugar Average: Last 72 hrs:  (P) 294 25     · Home regimen : Lantus 75 units b i d   With pre meal insulin 35 units Q a c   · Continue home regimen  · Monitor on steroids

## 2022-03-27 NOTE — ASSESSMENT & PLAN NOTE
Reported increasing shortness of breath gradually progressive over last few weeks with associated cough, noted increased purulence over last 2 days  Referred to ED with increasing shortness of breath, hypoxia  Chest x-ray without any acute abnormality  · Home regimen: Trelegy & albuterol neb q6h prn  · IP regimen: Breo & duo neb tid  · Transitioned to po prednisone taper  · Sputum culture not yet collected  · Procalcitonin nl  · Chest PT  · Activity as tolerated  · Pending authorization for short term rehab placement

## 2022-03-27 NOTE — ASSESSMENT & PLAN NOTE
Reported increasing shortness of breath gradually progressive over last few weeks with associated cough, noted increased purulence over last 2 days  Referred to ED with increasing shortness of breath, hypoxia  Chest x-ray without any acute abnormality  · Home regimen: Trelegy & albuterol neb q6h prn  · IP regimen: Breo & duo neb tid  · Transition to po prednisone today  · Sputum culture not yet collected  · Procalcitonin nl  · Chest PT  · Activity as tolerated  · Pending authorization for short term rehab placement

## 2022-03-27 NOTE — ASSESSMENT & PLAN NOTE
BUN   Date Value Ref Range Status   03/26/2022 20 5 - 25 mg/dL Final   03/25/2022 25 5 - 25 mg/dL Final   03/24/2022 29 (H) 5 - 25 mg/dL Final     Creatinine   Date Value Ref Range Status   03/26/2022 1 03 0 60 - 1 30 mg/dL Final     Comment:     Standardized to IDMS reference method   03/25/2022 0 93 0 60 - 1 30 mg/dL Final     Comment:     Standardized to IDMS reference method   03/24/2022 1 07 0 60 - 1 30 mg/dL Final     Comment:     Standardized to IDMS reference method

## 2022-03-28 LAB
ANION GAP SERPL CALCULATED.3IONS-SCNC: 7 MMOL/L (ref 4–13)
BUN SERPL-MCNC: 15 MG/DL (ref 5–25)
CALCIUM SERPL-MCNC: 8.1 MG/DL (ref 8.3–10.1)
CHLORIDE SERPL-SCNC: 94 MMOL/L (ref 100–108)
CO2 SERPL-SCNC: 29 MMOL/L (ref 21–32)
CREAT SERPL-MCNC: 0.91 MG/DL (ref 0.6–1.3)
GFR SERPL CREATININE-BSD FRML MDRD: 90 ML/MIN/1.73SQ M
GLUCOSE SERPL-MCNC: 104 MG/DL (ref 65–140)
GLUCOSE SERPL-MCNC: 119 MG/DL (ref 65–140)
GLUCOSE SERPL-MCNC: 152 MG/DL (ref 65–140)
GLUCOSE SERPL-MCNC: 241 MG/DL (ref 65–140)
GLUCOSE SERPL-MCNC: 249 MG/DL (ref 65–140)
GLUCOSE SERPL-MCNC: 90 MG/DL (ref 65–140)
POTASSIUM SERPL-SCNC: 3.3 MMOL/L (ref 3.5–5.3)
SODIUM SERPL-SCNC: 130 MMOL/L (ref 136–145)

## 2022-03-28 PROCEDURE — 94640 AIRWAY INHALATION TREATMENT: CPT

## 2022-03-28 PROCEDURE — 97535 SELF CARE MNGMENT TRAINING: CPT

## 2022-03-28 PROCEDURE — 99232 SBSQ HOSP IP/OBS MODERATE 35: CPT | Performed by: INTERNAL MEDICINE

## 2022-03-28 PROCEDURE — 82948 REAGENT STRIP/BLOOD GLUCOSE: CPT

## 2022-03-28 PROCEDURE — 97116 GAIT TRAINING THERAPY: CPT

## 2022-03-28 PROCEDURE — 94760 N-INVAS EAR/PLS OXIMETRY 1: CPT

## 2022-03-28 PROCEDURE — 80048 BASIC METABOLIC PNL TOTAL CA: CPT | Performed by: PHYSICIAN ASSISTANT

## 2022-03-28 PROCEDURE — 94660 CPAP INITIATION&MGMT: CPT

## 2022-03-28 PROCEDURE — 97110 THERAPEUTIC EXERCISES: CPT

## 2022-03-28 RX ADMIN — IPRATROPIUM BROMIDE AND ALBUTEROL SULFATE 3 ML: 2.5; .5 SOLUTION RESPIRATORY (INHALATION) at 07:34

## 2022-03-28 RX ADMIN — ACETAMINOPHEN 650 MG: 325 TABLET, FILM COATED ORAL at 19:36

## 2022-03-28 RX ADMIN — GABAPENTIN 300 MG: 300 CAPSULE ORAL at 17:20

## 2022-03-28 RX ADMIN — FLUTICASONE FUROATE AND VILANTEROL TRIFENATATE 1 PUFF: 100; 25 POWDER RESPIRATORY (INHALATION) at 08:49

## 2022-03-28 RX ADMIN — QUETIAPINE FUMARATE 300 MG: 100 TABLET ORAL at 21:16

## 2022-03-28 RX ADMIN — TRAMADOL HYDROCHLORIDE 50 MG: 50 TABLET, COATED ORAL at 14:55

## 2022-03-28 RX ADMIN — TRAMADOL HYDROCHLORIDE 50 MG: 50 TABLET, COATED ORAL at 08:44

## 2022-03-28 RX ADMIN — Medication 1000 UNITS: at 08:48

## 2022-03-28 RX ADMIN — TRAMADOL HYDROCHLORIDE 50 MG: 50 TABLET, COATED ORAL at 21:16

## 2022-03-28 RX ADMIN — Medication 1 TABLET: at 08:46

## 2022-03-28 RX ADMIN — GABAPENTIN 300 MG: 300 CAPSULE ORAL at 08:46

## 2022-03-28 RX ADMIN — OXYCODONE HYDROCHLORIDE AND ACETAMINOPHEN 1000 MG: 500 TABLET ORAL at 08:47

## 2022-03-28 RX ADMIN — INSULIN GLARGINE 75 UNITS: 100 INJECTION, SOLUTION SUBCUTANEOUS at 08:48

## 2022-03-28 RX ADMIN — SERTRALINE HYDROCHLORIDE 50 MG: 50 TABLET ORAL at 08:47

## 2022-03-28 RX ADMIN — GUAIFENESIN 600 MG: 600 TABLET, EXTENDED RELEASE ORAL at 21:16

## 2022-03-28 RX ADMIN — IPRATROPIUM BROMIDE AND ALBUTEROL SULFATE 3 ML: 2.5; .5 SOLUTION RESPIRATORY (INHALATION) at 19:58

## 2022-03-28 RX ADMIN — IPRATROPIUM BROMIDE AND ALBUTEROL SULFATE 3 ML: 2.5; .5 SOLUTION RESPIRATORY (INHALATION) at 00:42

## 2022-03-28 RX ADMIN — OMEGA-3 FATTY ACIDS CAP 1000 MG 2000 MG: 1000 CAP at 08:46

## 2022-03-28 RX ADMIN — INSULIN LISPRO 4 UNITS: 100 INJECTION, SOLUTION INTRAVENOUS; SUBCUTANEOUS at 16:28

## 2022-03-28 RX ADMIN — APIXABAN 5 MG: 5 TABLET, FILM COATED ORAL at 08:47

## 2022-03-28 RX ADMIN — OMEGA-3 FATTY ACIDS CAP 1000 MG 2000 MG: 1000 CAP at 17:21

## 2022-03-28 RX ADMIN — PANTOPRAZOLE SODIUM 40 MG: 40 TABLET, DELAYED RELEASE ORAL at 06:18

## 2022-03-28 RX ADMIN — GUAIFENESIN 600 MG: 600 TABLET, EXTENDED RELEASE ORAL at 08:47

## 2022-03-28 RX ADMIN — BUSPIRONE HYDROCHLORIDE 10 MG: 10 TABLET ORAL at 17:21

## 2022-03-28 RX ADMIN — BUSPIRONE HYDROCHLORIDE 10 MG: 10 TABLET ORAL at 08:47

## 2022-03-28 RX ADMIN — METOPROLOL SUCCINATE 200 MG: 100 TABLET, EXTENDED RELEASE ORAL at 08:48

## 2022-03-28 RX ADMIN — DICLOFENAC SODIUM TOPICAL GEL, 1%, 2 G: 10 GEL TOPICAL at 21:21

## 2022-03-28 RX ADMIN — PREDNISONE 50 MG: 20 TABLET ORAL at 08:48

## 2022-03-28 RX ADMIN — INSULIN GLARGINE 75 UNITS: 100 INJECTION, SOLUTION SUBCUTANEOUS at 21:21

## 2022-03-28 RX ADMIN — ASPIRIN 81 MG: 81 TABLET, COATED ORAL at 08:47

## 2022-03-28 RX ADMIN — LOSARTAN POTASSIUM 50 MG: 50 TABLET, FILM COATED ORAL at 08:48

## 2022-03-28 RX ADMIN — DICLOFENAC SODIUM TOPICAL GEL, 1%, 2 G: 10 GEL TOPICAL at 07:33

## 2022-03-28 RX ADMIN — TORSEMIDE 40 MG: 20 TABLET ORAL at 08:47

## 2022-03-28 RX ADMIN — DICLOFENAC SODIUM TOPICAL GEL, 1%, 2 G: 10 GEL TOPICAL at 14:55

## 2022-03-28 RX ADMIN — IPRATROPIUM BROMIDE AND ALBUTEROL SULFATE 3 ML: 2.5; .5 SOLUTION RESPIRATORY (INHALATION) at 13:41

## 2022-03-28 RX ADMIN — ALPRAZOLAM 2 MG: 0.5 TABLET ORAL at 21:16

## 2022-03-28 RX ADMIN — ATORVASTATIN CALCIUM 80 MG: 80 TABLET, FILM COATED ORAL at 08:47

## 2022-03-28 RX ADMIN — INSULIN LISPRO 1 UNITS: 100 INJECTION, SOLUTION INTRAVENOUS; SUBCUTANEOUS at 02:55

## 2022-03-28 RX ADMIN — DIGOXIN 250 MCG: 125 TABLET ORAL at 08:46

## 2022-03-28 RX ADMIN — APIXABAN 5 MG: 5 TABLET, FILM COATED ORAL at 17:21

## 2022-03-28 RX ADMIN — ACETAMINOPHEN 650 MG: 325 TABLET, FILM COATED ORAL at 07:32

## 2022-03-28 RX ADMIN — QUETIAPINE FUMARATE 100 MG: 50 TABLET, EXTENDED RELEASE ORAL at 08:48

## 2022-03-28 RX ADMIN — INSULIN LISPRO 2 UNITS: 100 INJECTION, SOLUTION INTRAVENOUS; SUBCUTANEOUS at 21:20

## 2022-03-28 RX ADMIN — SPIRONOLACTONE 25 MG: 25 TABLET ORAL at 08:47

## 2022-03-28 NOTE — OCCUPATIONAL THERAPY NOTE
OT TREATMENT       03/28/22 1115   Note Type   Note Type Treatment for insurance authorization   Restrictions/Precautions   Other Precautions Chair Alarm; Bed Alarm; Fall Risk;Pain   Pain Assessment   Pain Assessment Tool 0-10   Pain Score 8   Pain Location/Orientation Location: Back; Location: Generalized   ADL   Eating Assistance 7  Independent   Grooming Assistance 5  Supervision/Setup   Grooming Deficit Wash/dry hands;Standing with assistive device   Grooming Comments standing at sink, seated completed combing hair with setup only    UB Bathing Assistance 4  Minimal Assistance   LB Bathing Assistance 3  Moderate Assistance   UB Dressing Assistance 4  Minimal Assistance   LB Dressing Assistance 3  Moderate Assistance   Toileting Assistance  4  Minimal Assistance   Transfers   Sit to Stand 5  Supervision   Stand to Sit 5  Supervision   Toilet transfer 5  Supervision   Additional items Standard toilet  (grab bar use )   Functional Mobility   Functional Mobility 4  Minimal assistance   Additional Comments 15 feet x 2   Additional items Rolling walker   ROM- Right Upper Extremities   R Shoulder AROM; Flexion  (20 degrees AROM)   R Elbow AROM;Elbow flexion;Elbow extension   R Hand AROM; Index finger; Thumb; Long finger;Ring finger;Little finger   R Weight/Reps/Sets 5 reps x 2 seated in chair with rest breaks    ROM - Left Upper Extremities    L Shoulder AROM; Flexion  (AROM 20 degrees shoulder flexion)   L Elbow AROM;Elbow flexion;Elbow extension   L Hand AROM; Thumb; Long finger; Index finger;Ring finger;Little finger   L Weight/Reps/Sets 5 reps x 2 seated in chair with rest breaks    Cognition   Overall Cognitive Status WFL   Arousal/Participation Cooperative   Attention Within functional limits   Orientation Level Oriented X4   Following Commands Follows multistep commands with increased time or repetition   Assessment   Assessment Patient seen for OT treatment    Patient with limited activity tolerance due to pain and weakness  Patient requires supervision for transfers and min assist for functional mobility with RW  Patient requires mod assist for LB ADLS and min assist for UB ADLS  Patient would require increased assist for IADLS and therefore will benefit from STR  The patient's raw score on the AM-PAC Daily Activity inpatient short form is 17, standardized score is 37 26, less than 39 4  Patients at this level are likely to benefit from DC to post-acute rehabilitation services  Please refer to the recommendation of the Occupational Therapist for safe DC planning  Plan   Treatment Interventions ADL retraining;Functional transfer training;UE strengthening/ROM; Endurance training;Patient/family training;Equipment evaluation/education; Activityengagement; Compensatory technique education   OT Frequency 3-5x/wk   Recommendation   OT Discharge Recommendation Post acute rehabilitation services   AM-PAC Daily Activity Inpatient   Lower Body Dressing 2   Bathing 2   Toileting 3   Upper Body Dressing 3   Grooming 3   Eating 4   Daily Activity Raw Score 17   Daily Activity Standardized Score (Calc for Raw Score >=11) 37 26   AM-PAC Applied Cognition Inpatient   Following a Speech/Presentation 4   Understanding Ordinary Conversation 4   Taking Medications 4   Remembering Where Things Are Placed or Put Away 4   Remembering List of 4-5 Errands 4   Taking Care of Complicated Tasks 4   Applied Cognition Raw Score 24   Applied Cognition Standardized Score 19 11   Licensure   [place] License Number  Rhett Davis [address] 87 OTR/L [id number]

## 2022-03-28 NOTE — PHYSICAL THERAPY NOTE
PT TREATMENT     03/28/22 6307   PT Last Visit   PT Visit Date 03/28/22   Note Type   Note Type Treatment   Pain Assessment   Pain Assessment Tool 0-10   Pain Score   (8-9/10)   Pain Location/Orientation Orientation: Bilateral;Orientation: Lower; Location: Back   Restrictions/Precautions   Other Precautions O2;Fall Risk;Bed Alarm; Chair Alarm;Pain   General   Chart Reviewed Yes   Subjective   Subjective I can not do too much because my low back is in a lot of pain   Bed Mobility   Supine to Sit 5  Supervision   Additional items Verbal cues; Increased time required; Bedrails   Sit to Supine 5  Supervision   Additional items Verbal cues; Increased time required; Bedrails   Transfers   Sit to Stand   (Min assist/close S)   Additional items Assist x 1;Verbal cues; Increased time required   Stand to Sit   (Min assist/close S)   Additional items Assist x 1;Verbal cues   Ambulation/Elevation   Gait pattern   (Slow christina due to pain; minimal unsteadiness)   Gait Assistance 4  Minimal assist   Additional items Assist x 1;Verbal cues; Tactile cues   Assistive Device Rolling walker   Distance 20 ft with change in direction; 10 ft with change in direction; rest in between both walks due to moderate fatigue   Balance   Static Sitting Fair +   Static Standing Fair  (With RW)   Dynamic Standing Fair -  (With RW)   Ambulatory Fair -  (With RW)   Activity Tolerance   Activity Tolerance Patient limited by fatigue;Patient limited by pain;Treatment limited secondary to medical complications (Comment)  (Generalized weakness and deconditioning)   Exercises   Hip Abduction 5 reps; Sitting;Bilateral   Knee AROM Long Arc Quad 5 reps; Sitting;Bilateral  (X2 sets)   Ankle Pumps 5 reps; Sitting;Bilateral  (Toe and heel raises)   Assessment   Assessment Pt with limited tolerance to bed mobility, transfers, ambulation with a RW and BLE exercises  Pt with moderate fatigue and 8-9/10 pain to bilateral low back with all activities    Pt fatigues easily, has moderate limited endurance and needs rest breaks frequently between each exercise and/or activity  Pt is appropriate for post acute rehab services when medically stable for discharge to increase his endurance, strength, balance, gait and functional mobility  Pt is not safe for discharge home as he lives alone and is weak with low endurance  The patient's [facility] Basic Mobility Inpatient Short Form Raw Score is 19  A Raw score of greater than 16 suggests the patient may benefit from discharge to home  Please also refer to the recommendation of the Physical Therapist for safe discharge planning  Despite ampac score, pt is not safe for discharge home, is weak with low endurance and is at risk for falls  Pt is appropriate for post acute rehab services to further increase his strength and mobility as well as endurance, balance and gait  Plan   Treatment/Interventions ADL retraining;Functional transfer training;LE strengthening/ROM; Therapeutic exercise; Endurance training;Patient/family training;Equipment eval/education; Bed mobility;Gait training; Compensatory technique education   PT Frequency Other (Comment)  (5x/wk)   Recommendation   PT Discharge Recommendation Post acute rehabilitation services   [facility] Basic Mobility Inpatient   Turning in Bed Without Bedrails 4   Lying on Back to Sitting on Edge of Flat Bed 4   Moving Bed to Chair 3   Standing Up From Chair 3   Walk in Room 3   Climb 3-5 Stairs 2   Basic Mobility Inpatient Raw Score 19   Basic Mobility Standardized Score 42 48   Highest Level Of Mobility   JH-HLM Goal 6: Walk 10 steps or more   JH-HLM Highest Level of Mobility 6: Walk 10 steps or more   JH-HLM Goal Achieved Yes   Education   Education Provided Mobility training;Assistive device   Patient Explanation/teachback used; Reinforcement needed   End of Consult   Patient Position at End of Consult Supine; All needs within reach;Bed/Chair alarm activated   Licensure   [place] License Number [facility] [id number]     Portions of the documentation may have been created using voice recognition software  Occasional wrong word or sound alike substitutions may have occurred due to the inherent limitation of the voice recognition software  Read the chart carefully and recognize, using context, where substitutions have occurred

## 2022-03-28 NOTE — PLAN OF CARE
Problem: PAIN - ADULT  Goal: Verbalizes/displays adequate comfort level or baseline comfort level  Description: Interventions:  - Encourage patient to monitor pain and request assistance  - Assess pain using appropriate pain scale  - Administer analgesics based on type and severity of pain and evaluate response  - Implement non-pharmacological measures as appropriate and evaluate response  - Consider cultural and social influences on pain and pain management  - Notify physician/advanced practitioner if interventions unsuccessful or patient reports new pain  Outcome: Adequate for Discharge  Note: Chronic back pain controlled with current pain regimen  Problem: RESPIRATORY - ADULT  Goal: Achieves optimal ventilation and oxygenation  Description: INTERVENTIONS:  - Assess for changes in respiratory status  - Assess for changes in mentation and behavior  - Position to facilitate oxygenation and minimize respiratory effort  - Oxygen administered by appropriate delivery if ordered  - Encourage broncho-pulmonary hygiene including cough, deep breathe, Incentive Spirometry  - Assess and instruct to report SOB or any respiratory difficulty  - Respiratory Therapy support as indicated  Outcome: Progressing  Note: Stable on 2lpm while at rest, may need 3lpm with activity - baseline

## 2022-03-28 NOTE — PLAN OF CARE
Problem: RESPIRATORY - ADULT  Goal: Achieves optimal ventilation and oxygenation  Description: INTERVENTIONS:  - Assess for changes in respiratory status  - Assess for changes in mentation and behavior  - Position to facilitate oxygenation and minimize respiratory effort  - Oxygen administered by appropriate delivery if ordered  - Encourage broncho-pulmonary hygiene including cough, deep breathe, Incentive Spirometry  - Assess and instruct to report SOB or any respiratory difficulty  - Respiratory Therapy support as indicated  Outcome: Progressing

## 2022-03-28 NOTE — PLAN OF CARE
Problem: OCCUPATIONAL THERAPY ADULT  Goal: Performs self-care activities at highest level of function for planned discharge setting  See evaluation for individualized goals  Description: Treatment Interventions: ADL retraining,Functional transfer training,UE strengthening/ROM,Endurance training,Patient/family training,Equipment evaluation/education,Activityengagement,Compensatory technique education          See flowsheet documentation for full assessment, interventions and recommendations  Outcome: Progressing  Note: Limitation: Decreased ADL status,Decreased UE strength,Decreased Safe judgement during ADL,Decreased endurance,Decreased self-care trans,Decreased high-level ADLs,Decreased UE ROM (decreased balance and mobility )  Prognosis: Good  Assessment: Patient seen for OT treatment  Patient with limited activity tolerance due to pain and weakness  Patient requires supervision for transfers and min assist for functional mobility with RW  Patient requires mod assist for LB ADLS and min assist for UB ADLS  Patient would require increased assist for IADLS and therefore will benefit from STR        OT Discharge Recommendation: Post acute rehabilitation services

## 2022-03-28 NOTE — CASE MANAGEMENT
Case Management Progress Note    Patient name Guy Vega  Location 18 [facility] [address] [place] [address] MRN [id number]  : 1960 Date 3/28/2022       LOS (days): 4  Geometric Mean LOS (GMLOS) (days): 3 60  Days to GMLOS:-0 2        OBJECTIVE:        Current admission status: Inpatient  Preferred Pharmacy:   [address] [place] [address]  Phone: [phone] Fax: [phone]    Primary Care Provider: Diana Mabry MD    Primary Insurance: MEDICARE MISC REPLACEMENT  Secondary Insurance:     PROGRESS NOTE:  Dr Jarek Soler made aware of potential auth denied  He is made aware to contact Peer to Peer by 12 today      For [place] [facility]- intent to deny SNF but offering a peer to peer by noon today 3/28/2022 p# [phone]

## 2022-03-28 NOTE — CASE MANAGEMENT
Case Management Discharge Planning Note    Patient name Sudhakar Dugan  Location 2 [address] [place] [address] MRN [id number]  : 1960 Date 3/28/2022       Current Admission Date: 3/24/2022  Current Admission Diagnosis:COPD with exacerbation [facility])   Patient Active Problem List    Diagnosis Date Noted    Chronic pain syndrome 2022    Foraminal stenosis of lumbar region 2022    Lumbar post-laminectomy syndrome 2022    Lumbar radiculopathy 2022    Shortness of breath 2022    SIRS [facility]systemic inflammatory response syndrome) [facility] 2022    Dysuria 2021    Peripheral neuropathy 2021    Chronic left-sided low back pain with left-sided sciatica 2021    BMI 40 0-44 9, adult ([facility] ) 2021    Muscle weakness (generalized) 2021    Platelets decreased [facility] ) 2021    Medicare annual wellness visit, subsequent 2021    Chronic congestive heart failure ([facility] ) 2020    Leukocytosis 10/29/2020    Hyperlipidemia 10/29/2020    Nutritional anemia, unspecified  10/29/2020    Chest pain 10/11/2020    Simple chronic bronchitis ([facility] ) 10/11/2020    PAF (paroxysmal atrial fibrillation) ([facility] ) 2020    Hyperglycemia 2020    Transition of care performed with sharing of clinical summary 2020    Obstructive sleep apnea 2020    Obesity (BMI 30-39 9) 2020    Stage 2 chronic kidney disease 2020    Hyponatremia 2020    COPD (chronic obstructive pulmonary disease) ([facility] ) 2020    Chronic a-fib ([facility] ) 2020    H/O vitamin D deficiency 10/28/2019    Dyslipidemia 2019    Type 2 diabetes mellitus with complication, with long-term current use of insulin ([facility] ) 2019    Restrictive ventilatory defect 2019    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 [facility]) 2019    Lung disease, restrictive 2018    Chronic respiratory failure with hypoxia ([place] [address] ) 10/31/2018    Coronary artery disease involving native heart 09/06/2017    Esophageal reflux 09/06/2017    Back ache 11/30/2016    SHAVONNE and COPD overlap syndrome ([place] [address] )     Morbid obesity ([place] [address] )     Uncontrolled type 2 diabetes mellitus with hyperglycemia ([place] [address] ) 09/02/2016    Fatigue 09/02/2016    Chronic reflux esophagitis 06/08/2016    Cough 01/13/2016    COPD with exacerbation ([place] [address] ) 01/13/2016    Bipolar affective disorder ([place] [address] )     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/25/2015    Erectile dysfunction of organic origin 08/25/2014    Benign essential hypertension 09/04/2012    Diabetic neuropathy (HCC) 09/04/2012    Panic disorder without agoraphobia 09/04/2012    Vitamin D deficiency 09/04/2012      LOS (days): 4  Geometric Mean LOS (GMLOS) (days): 3 60  Days to GMLOS:-0 4     OBJECTIVE:  Risk of Unplanned Readmission Score: 38         Current admission status: Inpatient   Preferred Pharmacy:   [address] [place] [address]  Phone: [phone] Fax: [phone]    Primary Care Provider: Sabi Blackwell MD    Primary Insurance: MEDICARE [place]:     DISCHARGE DETAILS:                 [facility] [address] [place] [address] Number: SNF auth request denied, appeal [phone]# [phone] Fax# [phone]

## 2022-03-28 NOTE — CASE MANAGEMENT
Case Management Progress Note    Patient name Hailey Schneider  Location 18 [facility] [address] [place] [address] MRN [id number]  : 1960 Date 3/28/2022       LOS (days): 4  Geometric Mean LOS (GMLOS) (days): 3 60  Days to GMLOS:-0 4        OBJECTIVE:        Current admission status: Inpatient  Preferred Pharmacy:   [address] [place] [address]  Phone: [phone] Fax: [phone]    Primary Care Provider: Nasir Mccallum MD    Primary Insurance: MEDICARE [facility]  Secondary Insurance:     PROGRESS NOTE:    CM spoke with Viola Napier from [facility]# [phone]  She wanted clarification regarding Pt/Ot and updated on new submitted auth at their request   They are resubmitting and will get back if authorization is approved

## 2022-03-28 NOTE — CASE MANAGEMENT
Case Management Discharge Planning Note    Patient name Navjot Islas  Location 2 [facility] [id number]  : 1960 Date 3/28/2022       Current Admission Date: 3/24/2022  Current Admission Diagnosis:COPD with exacerbation [facility])   Patient Active Problem List    Diagnosis Date Noted    Chronic pain syndrome 2022    Foraminal stenosis of lumbar region 2022    Lumbar post-laminectomy syndrome 2022    Lumbar radiculopathy 2022    Shortness of breath 2022    SIRS (systemic inflammatory response syndrome) [facility] 2022    Dysuria 2021    Peripheral neuropathy 2021    Chronic left-sided low back pain with left-sided sciatica 2021    BMI 40 0-44 9, adult ([facility] ) 2021    Muscle weakness (generalized) 2021    Platelets decreased [facility] 2021    Medicare annual wellness visit, subsequent 2021    Chronic congestive heart failure ([facility] ) 2020    Leukocytosis 10/29/2020    Hyperlipidemia 10/29/2020    Nutritional anemia, unspecified  10/29/2020    Chest pain 10/11/2020    Simple chronic bronchitis ([facility] ) 10/11/2020    PAF (paroxysmal atrial fibrillation) ([facility] ) 2020    Hyperglycemia 2020    Transition of care performed with sharing of clinical summary 2020    Obstructive sleep apnea 2020    Obesity (BMI 30-39 9) 2020    Stage 2 chronic kidney disease 2020    Hyponatremia 2020    COPD (chronic obstructive pulmonary disease) ([facility] ) 2020    Chronic a-fib ([facility] ) 2020    H/O vitamin D deficiency 10/28/2019    Dyslipidemia 2019    Type 2 diabetes mellitus with complication, with long-term current use of insulin ([facility] ) 2019    Restrictive ventilatory defect 2019    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) 2019    Lung disease, restrictive 2018    Chronic respiratory failure with hypoxia ([facility] ) 10/31/2018    Coronary artery disease involving native heart 09/06/2017    Esophageal reflux 09/06/2017    Back ache 11/30/2016    SHVAONNE and COPD overlap syndrome ([place] [address] )     Morbid obesity ([place] [address] )     Uncontrolled type 2 diabetes mellitus with hyperglycemia ([place] [address] ) 09/02/2016    Fatigue 09/02/2016    Chronic reflux esophagitis 06/08/2016    Cough 01/13/2016    COPD with exacerbation ([place] [address] ) 01/13/2016    Bipolar affective disorder ([place] [address] )     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/25/2015    Erectile dysfunction of organic origin 08/25/2014    Benign essential hypertension 09/04/2012    Diabetic neuropathy (HCC) 09/04/2012    Panic disorder without agoraphobia 09/04/2012    Vitamin D deficiency 09/04/2012      LOS (days): 4  Geometric Mean LOS (GMLOS) (days): 3 60  Days to GMLOS:-0 4     OBJECTIVE:  Risk of Unplanned Readmission Score: 38         Current admission status: Inpatient   Preferred Pharmacy:   [address] [place], [place] [id number]  Phone: [phone] Fax: [phone]    Primary Care Provider: Flakito Briggs MD    Primary Insurance: MEDICARE [address] [facility]  Secondary Insurance:     DISCHARGE DETAILS:           [address] [place] [address] Number: Re-submitted SNF auth request with updated clinicals to [place] pelayo, pending ref# [id number] for [facility]: 3/28/2022 at [facility] faxed to [phone]

## 2022-03-28 NOTE — CASE MANAGEMENT
Case Management Discharge Planning Note    Patient name Guy Vega  [facility] 2 [facility] [id number]  : 1960 Date 3/28/2022       Current Admission Date: 3/24/2022  Current Admission Diagnosis:COPD with exacerbation [facility])   Patient Active Problem List    Diagnosis Date Noted    Chronic pain syndrome 2022    Foraminal stenosis of lumbar region 2022    Lumbar post-laminectomy syndrome 2022    Lumbar radiculopathy 2022    Shortness of breath 2022    SIRS (systemic inflammatory response syndrome) [facility] 2022    Dysuria 2021    Peripheral neuropathy 2021    Chronic left-sided low back pain with left-sided sciatica 2021    BMI 40 0-44 9, adult ([facility] ) 2021    Muscle weakness (generalized) 2021    Platelets decreased [facility] ) 2021    Medicare annual wellness visit, subsequent 2021    Chronic congestive heart failure ([facility] ) 2020    Leukocytosis 10/29/2020    Hyperlipidemia 10/29/2020    Nutritional anemia, unspecified  10/29/2020    Chest pain 10/11/2020    Simple chronic bronchitis ([facility] ) 10/11/2020    PAF (paroxysmal atrial fibrillation) ([facility] ) 2020    Hyperglycemia 2020    Transition of care performed with sharing of clinical summary 2020    Obstructive sleep apnea 2020    Obesity (BMI 30-39 9) 2020    Stage 2 chronic kidney disease 2020    Hyponatremia 2020    COPD (chronic obstructive pulmonary disease) ([facility] ) 2020    Chronic a-fib ([facility] ) 2020    H/O vitamin D deficiency 10/28/2019    Dyslipidemia 2019    Type 2 diabetes mellitus with complication, with long-term current use of insulin ([facility] ) 2019    Restrictive ventilatory defect 2019    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 [facility]) 2019    Lung disease, restrictive 2018    Chronic respiratory failure with hypoxia ([place] [address] ) 10/31/2018    Coronary artery disease involving native heart 09/06/2017    Esophageal reflux 09/06/2017    Back ache 11/30/2016    SHAVONNE and COPD overlap syndrome ([place] [address] )     Morbid obesity ([place] [address] )     Uncontrolled type 2 diabetes mellitus with hyperglycemia ([place] [address] ) 09/02/2016    Fatigue 09/02/2016    Chronic reflux esophagitis 06/08/2016    Cough 01/13/2016    COPD with exacerbation ([place] [address] ) 01/13/2016    Bipolar affective disorder ([place] [address] )     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/25/2015    Erectile dysfunction of organic origin 08/25/2014    Benign essential hypertension 09/04/2012    Diabetic neuropathy ([place] [address] ) 09/04/2012    Panic disorder without agoraphobia 09/04/2012    Vitamin D deficiency 09/04/2012      LOS (days): 4  Geometric Mean LOS (GMLOS) (days): 3 60  Days to GMLOS:-0 4     OBJECTIVE:  Risk of Unplanned Readmission Score: 38         Current admission status: Inpatient   Preferred Pharmacy:   [address] [place] [address]  Phone: [phone] Fax: [phone]    Primary Care Provider: Raman Khan MD    Primary Insurance: [address] [place] [address]  Secondary Insurance:     DISCHARGE DETAILS:                [facility] [address] [place] [address] Number: Yogesh Albarran at [place] (callback P# [phone]), since peer to peer was completed today, new auth request can not be submitted  Appeal must be submitted and called into p# [phone] Fax# [phone]  CM notified

## 2022-03-28 NOTE — PROGRESS NOTES
[facility] Internal Medicine Progress Note  Patient: Matthew Colmenares 57 y o  male   MRN: [id number]  PCP: Delfino Page MD  Unit/Bed#: [address] [place] [address] Encounter: [id number]  Date Of Visit: 03/28/22    Problem List:    Principal Problem:    COPD with exacerbation ([facility] )  Active Problems:    Uncontrolled type 2 diabetes mellitus with hyperglycemia ([place] [address] )    SHAVONNE and COPD overlap syndrome ([place] [address] )    Coronary artery disease involving native heart    Chronic respiratory failure with hypoxia ([place] [address] )    Stage 2 chronic kidney disease    Chronic a-fib (HCC)    Chronic congestive heart failure (HCC)    Chronic left-sided low back pain with left-sided sciatica    Bipolar affective disorder ([place] [address] )    Benign essential hypertension    Peripheral neuropathy      Assessment & Plan:    * COPD with exacerbation ([place] [address] )  Assessment & Plan  Reported increasing shortness of breath gradually progressive over last few weeks with associated cough, noted increased purulence over last 2 days  Referred to ED with increasing shortness of breath, hypoxia  Chest x-ray without any acute abnormality  · Home regimen: Trelegy & albuterol neb q6h prn  · IP regimen: Breo & duo neb tid  · Transitioned to po prednisone taper  · Sputum culture not yet collected  · Procalcitonin nl  · Chest PT  · Activity as tolerated  · Pending authorization for short term rehab placement     Metabolic encephalopathy-resolved as of 3/25/2022  Assessment & Plan  Patient reported increasing fatigue and lethargy  Noted to be somnolent on presentation  CT head unremarkable    ABG without any hypercarbia  Patient improved spontaneously after presentation  Possibly hypoxia or medications related, patient denies any new medication or excessive use of sedatives/opiates  · Continue supplemental oxygen  · Monitor mental status    Chronic left-sided low back pain with left-sided sciatica  Assessment & Plan  · Follows up with Pain Management  · Continue Tylenol, diclofenac topical gel, gabapentin, tramadol  · PT/OT - rehab    Chronic congestive heart failure [facility])  Assessment & Plan  Wt Readings from Last 3 Encounters:   03/28/22 126 kg (277 lb 12 5 oz)   02/07/22 (!) 137 kg (301 lb)   01/11/22 136 kg (300 lb)     Chest x-ray without any evidence of CHF  ProBNP low at 183  No evidence of volume overload clinically  Continue metoprolol, digoxin, spironolactone, torsemide  BMP noted   Check digoxin level  Fluid restriction, emphasized        Chronic a-fib (HCC)  Assessment & Plan  Controlled  Continue metoprolol, digoxin, Eliquis  Check digoxin level    Stage 2 chronic kidney disease  Assessment & Plan  BUN   Date Value Ref Range Status   03/28/2022 15 5 - 25 mg/dL Final   03/27/2022 15 5 - 25 mg/dL Final   03/26/2022 20 5 - 25 mg/dL Final     Creatinine   Date Value Ref Range Status   03/28/2022 0 91 0 60 - 1 30 mg/dL Final     Comment:     Standardized to IDMS reference method   03/27/2022 0 86 0 60 - 1 30 mg/dL Final     Comment:     Standardized to IDMS reference method   03/26/2022 1 03 0 60 - 1 30 mg/dL Final     Comment:     Standardized to IDMS reference method       Chronic respiratory failure with hypoxia (HCC)  Assessment & Plan  On 2-3 L of supplemental oxygen at baseline    Coronary artery disease involving native heart  Assessment & Plan  Continue aspirin, metoprolol, statin    SHAVONNE and COPD overlap syndrome ([facility] )  Assessment & Plan  On BiPAP 12/5  Reports compliance    Uncontrolled type 2 diabetes mellitus with hyperglycemia [facility])  Assessment & Plan  Lab Results   Component Value Date    HGBA1C 8 6 (H) 03/25/2022       Recent Labs     03/28/22  0724 03/28/22  1123 03/28/22  1622 03/28/22  2105   POCGLU 90 119 249* 241*       Blood Sugar Average: Last 72 hrs:  (P) 245  3   Hyperglycemia secondary to steroids  · Home regimen : Lantus 75 units b i d   With pre meal insulin 35 units Q a c   · Continue home regimen with sliding scale  · Monitor on steroids    Peripheral neuropathy  Assessment & Plan  Continue gabapentin    Benign essential hypertension  Assessment & Plan  Stable  Monitor on metoprolol , losartan, spironolactone    Bipolar affective disorder [facility])  Assessment & Plan  Continue Zoloft, Seroquel, Xanax    Dizziness-resolved as of 3/27/2022  Assessment & Plan  · Orthostatics negative  · Postural dizziness likely from autonomic dysfunction from uncontrolled diabetes  · Exam is non-focal        VTE Pharmacologic Prophylaxis: VTE Score: 4 Moderate Risk (Score 3-4) - Pharmacological DVT Prophylaxis Ordered: apixaban (Eliquis)  Patient Centered Rounds: I performed bedside rounds with nursing staff today  Discussions with Specialists or Other Care Team Provider:  YES, case management    Education and Discussions with Family / Patient: Patient declined call to   Time Spent for Care: 45 minutes  More than 50% of total time spent on counseling and coordination of care as described above  Current Length of Stay: 4 day(s)  Current Patient Status: Inpatient   Certification Statement: The patient will continue to require additional inpatient hospital stay due to Awaiting insurance authorization  Discharge Plan: Anticipate discharge tomorrow to rehab facility  Code Status: Level 1 - Full Code    Subjective:   Reported having some cough overnight   Breathing is stable   Pain is controlled with current medication, working with physical therapy    Objective:     Vitals:   Temp (24hrs), Av 7 °F (36 5 °C), Min:97 3 °F (36 3 °C), Max:98 7 °F (37 1 °C)    Temp:  [97 3 °F (36 3 °C)-98 7 °F (37 1 °C)] 98 7 °F (37 1 °C)  HR:  [60-81] 60  Resp:  [16-18] 16  BP: (108-156)/(53-80) 156/80  SpO2:  [94 %-98 %] 97 % on 2 L  Body mass index is 38 74 kg/m²  Input and Output Summary (last 24 hours):      Intake/Output Summary (Last 24 hours) at 3/28/2022 1941  Last data filed at 3/28/2022 0457  Gross per 24 hour   Intake --   Output 1050 ml   Net -1050 ml Physical Exam:   Physical Exam  Constitutional:       General: He is not in acute distress  Appearance: He is obese  HENT:      Head: Normocephalic and atraumatic  Cardiovascular:      Rate and Rhythm: Normal rate  Pulmonary:      Effort: Pulmonary effort is normal  No respiratory distress  Breath sounds: No wheezing, rhonchi or rales  Comments: Diminished bilaterally  Chest:      Chest wall: No tenderness  Abdominal:      General: Bowel sounds are normal  There is no distension  Palpations: Abdomen is soft  Tenderness: There is no abdominal tenderness  There is no guarding or rebound  Musculoskeletal:      Right lower leg: No edema  Left lower leg: No edema  Skin:     General: Skin is warm and dry  Findings: No rash  Neurological:      Mental Status: He is alert  Mental status is at baseline  Cranial Nerves: No cranial nerve deficit  Additional Data:     Labs:  Results from last 7 days   Lab Units 03/25/22  0612 03/24/22  1044 03/24/22  1044   WBC Thousand/uL 17 36*   < > 15 11*   HEMOGLOBIN g/dL 13 1   < > 14 3   HEMATOCRIT % 38 8   < > 41 5   PLATELETS Thousands/uL 180   < > 187   LYMPHO PCT %  --   --  30   MONO PCT %  --   --  4   EOS PCT %  --   --  0    < > = values in this interval not displayed  Results from last 7 days   Lab Units 03/28/22  0556 03/24/22  2152 03/24/22  1044   SODIUM mmol/L 130*   < > 135*   POTASSIUM mmol/L 3 3*   < > 3 8   CHLORIDE mmol/L 94*   < > 99*   CO2 mmol/L 29   < > 26   BUN mg/dL 15   < > 29*   CREATININE mg/dL 0 91   < > 1 07   ANION GAP mmol/L 7   < > 10   CALCIUM mg/dL 8 1*   < > 8 8   ALBUMIN g/dL  --   --  3 9   TOTAL BILIRUBIN mg/dL  --   --  0 40   ALK PHOS U/L  --   --  87   ALT U/L  --   --  45   AST U/L  --   --  26   GLUCOSE RANDOM mg/dL 104   < > 209*    < > = values in this interval not displayed           Results from last 7 days   Lab Units 03/28/22  1622 03/28/22  1123 03/28/22  3334 03/28/22  0252 03/27/22  2040 03/27/22  1612 03/27/22  1058 03/27/22  0717 03/27/22  0215 03/26/22  2029 03/26/22  1603 03/26/22  1047   POC GLUCOSE mg/dl 249* 119 90 152* 272* 355* 258* 130 125 301* 273* 287*     Results from last 7 days   Lab Units 03/25/22  0612   HEMOGLOBIN A1C % 8 6*     Results from last 7 days   Lab Units 03/25/22  0612 03/24/22 2049   PROCALCITONIN ng/ml 0 09 0 05       Lines/Drains:  Invasive Devices  Report    Peripheral Intravenous Line            Peripheral IV 03/27/22 Right Antecubital 1 day                      Imaging: Reviewed radiology reports from this admission including: chest xray    Recent Cultures (last 7 days):         Last 24 Hours Medication List:   Current Facility-Administered Medications   Medication Dose Route Frequency Provider Last Rate    acetaminophen  650 mg Oral Q4H PRN Evgeny Youngblood MD      ALPRAZolam  2 mg Oral HS Evgeny Youngblood MD      apixaban  5 mg Oral BID Evgeny Youngblood MD      vitamin C  1,000 mg Oral Daily Evgeny Youngblood MD      aspirin  81 mg Oral Daily Evgeny Youngblood MD      atorvastatin  80 mg Oral Daily Evgeny Youngblood MD      busPIRone  10 mg Oral BID Evgeny Youngblood MD      cholecalciferol  1,000 Units Oral Daily Evgeny Youngblood MD      Diclofenac Sodium  2 g Topical 4x Daily PRN Evgeny Youngblood MD      digoxin  250 mcg Oral Daily Evgeny Youngblood MD      fish oil  2,000 mg Oral BID Evgeny Youngblood MD      fluticasone-vilanterol  1 puff Inhalation Daily Evgeny Youngblood MD      gabapentin  300 mg Oral BID Evgeny Youngblood MD      guaiFENesin  600 mg Oral Q12H Abe Umana MD      insulin glargine  75 Units Subcutaneous Q12H [facility] vEgeny Youngblood MD      insulin lispro  1-5 Units Subcutaneous HS Evgeny Youngblood MD      insulin lispro  1-5 Units Subcutaneous 0200 Evgeny Youngblood MD      insulin lispro  2-12 Units Subcutaneous TID AC Evgeny Youngblood MD      insulin lispro  35 Units Subcutaneous TID With Melida Celeste MD      ipratropium-albuterol  3 mL Nebulization Q6H Poppy Sprague, MD      ipratropium-albuterol  3 mL Nebulization Q4H PRN Poppy Sprague MD      losartan  50 mg Oral Daily Poppy Sprageu, MD      metoprolol succinate  200 mg Oral Daily Poppy Sprague, MD      multivitamin-minerals  1 tablet Oral Daily Poppy Sprague, MD      pantoprazole  40 mg Oral Early Morning Poppy Sprague MD      [START ON 3/29/2022] predniSONE  40 mg Oral Daily Jayleen Demo, PA-C      Followed by   Bladimir Primes ON 4/1/2022] predniSONE  30 mg Oral Daily Jayleen Demo, PA-C      Followed by   Bladimir Primes ON 4/4/2022] predniSONE  20 mg Oral Daily Jayleen Demo, PA-C      Followed by   Bladimir Primes ON 4/5/2022] predniSONE  10 mg Oral Daily Jayleen Harshado, PA-C      QUEtiapine  100 mg Oral QAM Poppy Sprague, MD      QUEtiapine  300 mg Oral HS Poppy Sprague, MD      sertraline  50 mg Oral Daily Poppy Sprague, MD      spironolactone  25 mg Oral Daily Poppy Sprague, MD      torsemide  40 mg Oral Daily Poppy Sprague, MD      traMADol  50 mg Oral Q6H PRN Poppy Sprague MD          Today, Patient Was Seen By: Poppy Sprague MD    ** Please Note: "This note has been constructed using a voice recognition system  Therefore there may be syntax, spelling, and/or grammatical errors   Please call if you have any questions  "**

## 2022-03-28 NOTE — CASE MANAGEMENT
Case Management Discharge Planning Note    Patient name Deejay Quiroz  Location 2 [facility] 220/2 [facility] [address] 26 MRN [id number]  : 1960 Date 3/28/2022       Current Admission Date: 3/24/2022  Current Admission Diagnosis:COPD with exacerbation [facility])   Patient Active Problem List    Diagnosis Date Noted    Chronic pain syndrome 2022    Foraminal stenosis of lumbar region 2022    Lumbar post-laminectomy syndrome 2022    Lumbar radiculopathy 2022    Shortness of breath 2022    [facility]ic inflammatory response syndrome) [facility] 2022    Dysuria 2021    Peripheral neuropathy 2021    Chronic left-sided low back pain with left-sided sciatica 2021    BMI 40 0-44 9, adult ([facility] ) 2021    Muscle weakness (generalized) 2021    Platelets decreased ([facility] ) 2021    Medicare annual wellness visit, subsequent 2021    Chronic congestive heart failure ([facility] ) 2020    Leukocytosis 10/29/2020    Hyperlipidemia 10/29/2020    Nutritional anemia, unspecified  10/29/2020    Chest pain 10/11/2020    Simple chronic bronchitis ([facility] ) 10/11/2020    PAF (paroxysmal atrial fibrillation) ([facility] ) 2020    Hyperglycemia 2020    Transition of care performed with sharing of clinical summary 2020    Obstructive sleep apnea 2020    Obesity (BMI 30-39 9) 2020    Stage 2 chronic kidney disease 2020    Hyponatremia 2020    COPD (chronic obstructive pulmonary disease) ([facility] ) 2020    Chronic a-fib ([facility] ) 2020    H/O vitamin D deficiency 10/28/2019    Dyslipidemia 2019    Type 2 diabetes mellitus with complication, with long-term current use of insulin ([facility] ) 2019    Restrictive ventilatory defect 2019    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 [facility]) 2019    Lung disease, restrictive 2018    Chronic respiratory failure with hypoxia ([facility] ) 10/31/2018    Coronary artery disease involving native heart 09/06/2017    Esophageal reflux 09/06/2017    Back ache 11/30/2016    SHAVONNE and COPD overlap syndrome ([place] [address] )     Morbid obesity ([place] [address] )     Uncontrolled type 2 diabetes mellitus with hyperglycemia ([place] [address] ) 09/02/2016    Fatigue 09/02/2016    Chronic reflux esophagitis 06/08/2016    Cough 01/13/2016    COPD with exacerbation ([place] [address] ) 01/13/2016    Bipolar affective disorder ([place] [address] )     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/25/2015    Erectile dysfunction of organic origin 08/25/2014    Benign essential hypertension 09/04/2012    Diabetic neuropathy (HCC) 09/04/2012    Panic disorder without agoraphobia 09/04/2012    Vitamin D deficiency 09/04/2012      LOS (days): 4  Geometric Mean LOS (GMLOS) (days): 3 60  Days to GMLOS:-0 2     OBJECTIVE:  Risk of Unplanned Readmission Score: 38         Current admission status: Inpatient   Preferred Pharmacy:   [address] [place], [place] [id number]  Phone: [phone] Fax: [phone]    Primary Care Provider: Alexi Zamorano MD    Primary Insurance: MEDICARE [id number] [facility]  Secondary Insurance:     DISCHARGE DETAILS:           [address] [place] [address] Number: Per Costa pelayo, intend to deny SNF auth request but offering peer to peer by today 3/28/2022 Noon EST to p# [phone]  CM notified

## 2022-03-29 ENCOUNTER — TELEPHONE (OUTPATIENT)
Dept: FAMILY MEDICINE CLINIC | Facility: CLINIC | Age: 62
End: 2022-03-29

## 2022-03-29 VITALS
WEIGHT: 282.19 LBS | BODY MASS INDEX: 39.51 KG/M2 | SYSTOLIC BLOOD PRESSURE: 123 MMHG | TEMPERATURE: 99.4 F | DIASTOLIC BLOOD PRESSURE: 65 MMHG | OXYGEN SATURATION: 96 % | RESPIRATION RATE: 18 BRPM | HEIGHT: 71 IN | HEART RATE: 70 BPM

## 2022-03-29 LAB
ANION GAP SERPL CALCULATED.3IONS-SCNC: 6 MMOL/L (ref 4–13)
BUN SERPL-MCNC: 15 MG/DL (ref 5–25)
CALCIUM SERPL-MCNC: 7.8 MG/DL (ref 8.3–10.1)
CHLORIDE SERPL-SCNC: 91 MMOL/L (ref 100–108)
CO2 SERPL-SCNC: 30 MMOL/L (ref 21–32)
CREAT SERPL-MCNC: 0.85 MG/DL (ref 0.6–1.3)
DIGOXIN SERPL-MCNC: 0.4 NG/ML (ref 0.8–2)
GFR SERPL CREATININE-BSD FRML MDRD: 93 ML/MIN/1.73SQ M
GLUCOSE SERPL-MCNC: 117 MG/DL (ref 65–140)
GLUCOSE SERPL-MCNC: 165 MG/DL (ref 65–140)
GLUCOSE SERPL-MCNC: 184 MG/DL (ref 65–140)
GLUCOSE SERPL-MCNC: 300 MG/DL (ref 65–140)
GLUCOSE SERPL-MCNC: 92 MG/DL (ref 65–140)
POTASSIUM SERPL-SCNC: 4 MMOL/L (ref 3.5–5.3)
SODIUM SERPL-SCNC: 127 MMOL/L (ref 136–145)

## 2022-03-29 PROCEDURE — 94760 N-INVAS EAR/PLS OXIMETRY 1: CPT

## 2022-03-29 PROCEDURE — 99239 HOSP IP/OBS DSCHRG MGMT >30: CPT | Performed by: STUDENT IN AN ORGANIZED HEALTH CARE EDUCATION/TRAINING PROGRAM

## 2022-03-29 PROCEDURE — 94640 AIRWAY INHALATION TREATMENT: CPT

## 2022-03-29 PROCEDURE — 80048 BASIC METABOLIC PNL TOTAL CA: CPT | Performed by: INTERNAL MEDICINE

## 2022-03-29 PROCEDURE — 82948 REAGENT STRIP/BLOOD GLUCOSE: CPT

## 2022-03-29 PROCEDURE — 80162 ASSAY OF DIGOXIN TOTAL: CPT | Performed by: INTERNAL MEDICINE

## 2022-03-29 RX ORDER — PREDNISONE 20 MG/1
40 TABLET ORAL DAILY
Qty: 4 TABLET | Refills: 0 | Status: SHIPPED | OUTPATIENT
Start: 2022-03-30 | End: 2022-04-01

## 2022-03-29 RX ORDER — IPRATROPIUM BROMIDE AND ALBUTEROL SULFATE 2.5; .5 MG/3ML; MG/3ML
3 SOLUTION RESPIRATORY (INHALATION) EVERY 4 HOURS PRN
Qty: 252 ML | Refills: 0 | Status: SHIPPED | OUTPATIENT
Start: 2022-03-29 | End: 2022-04-12

## 2022-03-29 RX ORDER — IPRATROPIUM BROMIDE AND ALBUTEROL SULFATE 2.5; .5 MG/3ML; MG/3ML
3 SOLUTION RESPIRATORY (INHALATION)
Qty: 168 ML | Refills: 0 | Status: SHIPPED | OUTPATIENT
Start: 2022-03-29 | End: 2022-04-12

## 2022-03-29 RX ORDER — PREDNISONE 10 MG/1
30 TABLET ORAL DAILY
Qty: 9 TABLET | Refills: 0 | Status: SHIPPED | OUTPATIENT
Start: 2022-04-01 | End: 2022-04-04

## 2022-03-29 RX ORDER — GUAIFENESIN 600 MG
600 TABLET, EXTENDED RELEASE 12 HR ORAL EVERY 12 HOURS SCHEDULED
Qty: 28 TABLET | Refills: 0 | Status: SHIPPED | OUTPATIENT
Start: 2022-03-29 | End: 2022-04-12

## 2022-03-29 RX ORDER — PREDNISONE 20 MG/1
20 TABLET ORAL DAILY
Qty: 1 TABLET | Refills: 0 | Status: SHIPPED | OUTPATIENT
Start: 2022-04-04 | End: 2022-04-05

## 2022-03-29 RX ORDER — PREDNISONE 10 MG/1
10 TABLET ORAL DAILY
Qty: 1 TABLET | Refills: 0 | Status: SHIPPED | OUTPATIENT
Start: 2022-04-05 | End: 2022-04-06

## 2022-03-29 RX ADMIN — INSULIN LISPRO 2 UNITS: 100 INJECTION, SOLUTION INTRAVENOUS; SUBCUTANEOUS at 07:41

## 2022-03-29 RX ADMIN — INSULIN GLARGINE 75 UNITS: 100 INJECTION, SOLUTION SUBCUTANEOUS at 08:02

## 2022-03-29 RX ADMIN — LOSARTAN POTASSIUM 50 MG: 50 TABLET, FILM COATED ORAL at 08:03

## 2022-03-29 RX ADMIN — SERTRALINE HYDROCHLORIDE 50 MG: 50 TABLET ORAL at 08:02

## 2022-03-29 RX ADMIN — Medication 1 TABLET: at 09:57

## 2022-03-29 RX ADMIN — IPRATROPIUM BROMIDE AND ALBUTEROL SULFATE 3 ML: 2.5; .5 SOLUTION RESPIRATORY (INHALATION) at 00:01

## 2022-03-29 RX ADMIN — METOPROLOL SUCCINATE 200 MG: 100 TABLET, EXTENDED RELEASE ORAL at 08:02

## 2022-03-29 RX ADMIN — OXYCODONE HYDROCHLORIDE AND ACETAMINOPHEN 1000 MG: 500 TABLET ORAL at 08:02

## 2022-03-29 RX ADMIN — FLUTICASONE FUROATE AND VILANTEROL TRIFENATATE 1 PUFF: 100; 25 POWDER RESPIRATORY (INHALATION) at 09:57

## 2022-03-29 RX ADMIN — SPIRONOLACTONE 25 MG: 25 TABLET ORAL at 08:03

## 2022-03-29 RX ADMIN — INSULIN LISPRO 8 UNITS: 100 INJECTION, SOLUTION INTRAVENOUS; SUBCUTANEOUS at 16:03

## 2022-03-29 RX ADMIN — TRAMADOL HYDROCHLORIDE 50 MG: 50 TABLET, COATED ORAL at 09:57

## 2022-03-29 RX ADMIN — APIXABAN 5 MG: 5 TABLET, FILM COATED ORAL at 08:02

## 2022-03-29 RX ADMIN — IPRATROPIUM BROMIDE AND ALBUTEROL SULFATE 3 ML: 2.5; .5 SOLUTION RESPIRATORY (INHALATION) at 15:00

## 2022-03-29 RX ADMIN — TRAMADOL HYDROCHLORIDE 50 MG: 50 TABLET, COATED ORAL at 15:56

## 2022-03-29 RX ADMIN — ASPIRIN 81 MG: 81 TABLET, COATED ORAL at 08:02

## 2022-03-29 RX ADMIN — PANTOPRAZOLE SODIUM 40 MG: 40 TABLET, DELAYED RELEASE ORAL at 05:23

## 2022-03-29 RX ADMIN — GUAIFENESIN 600 MG: 600 TABLET, EXTENDED RELEASE ORAL at 08:04

## 2022-03-29 RX ADMIN — OMEGA-3 FATTY ACIDS CAP 1000 MG 2000 MG: 1000 CAP at 08:03

## 2022-03-29 RX ADMIN — GABAPENTIN 300 MG: 300 CAPSULE ORAL at 08:03

## 2022-03-29 RX ADMIN — PREDNISONE 40 MG: 20 TABLET ORAL at 08:03

## 2022-03-29 RX ADMIN — QUETIAPINE FUMARATE 100 MG: 50 TABLET, EXTENDED RELEASE ORAL at 09:59

## 2022-03-29 RX ADMIN — TORSEMIDE 40 MG: 20 TABLET ORAL at 08:03

## 2022-03-29 RX ADMIN — Medication 1000 UNITS: at 08:03

## 2022-03-29 RX ADMIN — BUSPIRONE HYDROCHLORIDE 10 MG: 10 TABLET ORAL at 08:03

## 2022-03-29 RX ADMIN — DIGOXIN 250 MCG: 125 TABLET ORAL at 08:02

## 2022-03-29 RX ADMIN — DICLOFENAC SODIUM TOPICAL GEL, 1%, 2 G: 10 GEL TOPICAL at 09:57

## 2022-03-29 RX ADMIN — IPRATROPIUM BROMIDE AND ALBUTEROL SULFATE 3 ML: 2.5; .5 SOLUTION RESPIRATORY (INHALATION) at 07:53

## 2022-03-29 RX ADMIN — ATORVASTATIN CALCIUM 80 MG: 80 TABLET, FILM COATED ORAL at 08:02

## 2022-03-29 NOTE — PLAN OF CARE
Problem: PAIN - ADULT  Goal: Verbalizes/displays adequate comfort level or baseline comfort level  Description: Interventions:  - Encourage patient to monitor pain and request assistance  - Assess pain using appropriate pain scale  - Administer analgesics based on type and severity of pain and evaluate response  - Implement non-pharmacological measures as appropriate and evaluate response  - Consider cultural and social influences on pain and pain management  - Notify physician/advanced practitioner if interventions unsuccessful or patient reports new pain  Outcome: Progressing     Problem: RESPIRATORY - ADULT  Goal: Achieves optimal ventilation and oxygenation  Description: INTERVENTIONS:  - Assess for changes in respiratory status  - Assess for changes in mentation and behavior  - Position to facilitate oxygenation and minimize respiratory effort  - Oxygen administered by appropriate delivery if ordered  - Encourage broncho-pulmonary hygiene including cough, deep breathe, Incentive Spirometry  - Assess and instruct to report SOB or any respiratory difficulty  - Respiratory Therapy support as indicated  Outcome: Progressing

## 2022-03-29 NOTE — TELEPHONE ENCOUNTER
Bob Warren from [facility]  Asked if you will sign homecare orders , I said you would he is being discharged from [facility] today tc/[facility]

## 2022-03-29 NOTE — NURSING NOTE
Roxy Aide removed and placed in the lanier  IV removed, DSD applied  All belongings were bagged up and transport here  ([place]) to take him home  All instructions gone over with patient on prednisone taper and written and verbally gone over with patient  Cell phone and glasses with patient

## 2022-03-29 NOTE — DISCHARGE SUMMARY
[facility]  Discharge- Ernestina Duverney 1960, 57 y o  male MRN: [id number]  Unit/Bed#: [address] [place] [address] Encounter: [id number]  Primary Care Provider: Mireille Kendrick MD   Date and time admitted to hospital: 3/24/2022 10:29 AM    Chronic left-sided low back pain with left-sided sciatica  Assessment & Plan  · Follows up with Pain Management  · Continue Tylenol, diclofenac topical gel, gabapentin, tramadol  · PT/OT - rehab    Peripheral neuropathy  Assessment & Plan  Continue gabapentin    Chronic congestive heart failure [facility])  Assessment & Plan  Wt Readings from Last 3 Encounters:   03/28/22 126 kg (277 lb 12 5 oz)   02/07/22 (!) 137 kg (301 lb)   01/11/22 136 kg (300 lb)     Chest x-ray without any evidence of CHF  ProBNP low at 183  No evidence of volume overload clinically  Continue metoprolol, digoxin, spironolactone, torsemide  BMP noted     Check digoxin level  Fluid restriction, emphasized        Chronic a-fib (HCC)  Assessment & Plan  Controlled  Continue metoprolol, digoxin, Eliquis  Check digoxin level    Stage 2 chronic kidney disease  Assessment & Plan  BUN   Date Value Ref Range Status   03/28/2022 15 5 - 25 mg/dL Final   03/27/2022 15 5 - 25 mg/dL Final   03/26/2022 20 5 - 25 mg/dL Final     Creatinine   Date Value Ref Range Status   03/28/2022 0 91 0 60 - 1 30 mg/dL Final     Comment:     Standardized to IDMS reference method   03/27/2022 0 86 0 60 - 1 30 mg/dL Final     Comment:     Standardized to IDMS reference method   03/26/2022 1 03 0 60 - 1 30 mg/dL Final     Comment:     Standardized to IDMS reference method       Chronic respiratory failure with hypoxia (HCC)  Assessment & Plan  On 2-3 L of supplemental oxygen at baseline    Benign essential hypertension  Assessment & Plan  Stable  Monitor on metoprolol , losartan, spironolactone    Coronary artery disease involving native heart  Assessment & Plan  Continue aspirin, metoprolol, statin    SHAVONNE and COPD overlap syndrome [facility])  Assessment & Plan  On BiPAP 12/5  Reports compliance    Uncontrolled type 2 diabetes mellitus with hyperglycemia [facility])  Assessment & Plan  Lab Results   Component Value Date    HGBA1C 8 6 (H) 03/25/2022       Recent Labs     03/28/22  0724 03/28/22  1123 03/28/22  1622 03/28/22  2105   POCGLU 90 119 249* 241*       Blood Sugar Average: Last 72 hrs:  (P) 245  3   Hyperglycemia secondary to steroids  · Home regimen : Lantus 75 units b i d  With pre meal insulin 35 units Q a c   · Continue home regimen with sliding scale  · Monitor on steroids    Bipolar affective disorder (HCC)  Assessment & Plan  Continue Zoloft, Seroquel, Xanax    * COPD with exacerbation ([facility] )  Assessment & Plan  Reported increasing shortness of breath gradually progressive over last few weeks with associated cough, noted increased purulence over last 2 days  Referred to ED with increasing shortness of breath, hypoxia  Chest x-ray without any acute abnormality  · Home regimen: Trelegy & albuterol neb q6h prn  · IP regimen: Breo & duo neb tid  · Transitioned to po prednisone taper  · Sputum culture not yet collected  · Procalcitonin nl  · Chest PT  · Activity as tolerated  · Pending authorization for short term rehab placement     Metabolic encephalopathy-resolved as of 3/25/2022  Assessment & Plan  Patient reported increasing fatigue and lethargy  Noted to be somnolent on presentation  CT head unremarkable  ABG without any hypercarbia  Patient improved spontaneously after presentation  Possibly hypoxia or medications related, patient denies any new medication or excessive use of sedatives/opiates  · Continue supplemental oxygen  · Monitor mental status    Dizziness-resolved as of 3/27/2022  Assessment & Plan  · Orthostatics negative  · Postural dizziness likely from autonomic dysfunction from uncontrolled diabetes     · Exam is non-focal      Medical Problems             Resolved Problems  Date Reviewed: 3/27/2022          Resolved Dizziness 3/27/2022     Resolved by  Tori Hidalgo PA-C    Metabolic encephalopathy 1/33/1661     Resolved by  Claudine Alford PA-C              Discharging Physician / Practitioner: Sha Guajardo DO  PCP: Deonte Belcher MD  Admission Date:   Admission Orders (From admission, onward)     Ordered        03/24/22 1506  Inpatient Admission  Once                      Discharge Date: 03/29/22      Significant Findings / Test Results:   · CXR: No acute cardiopulmonary disease  · Head CT: No acute intracranial abnormality  Complications:  None     Reason for Admission: COPD exacerbation     Hospital Course:   Sylvia Shaikh is a 57 y o  male patient who originally presented to the hospital on 3/24/2022 due to shortness of breath and encephalopathy, found to have acute exacerbation of COPD  Patient was treated with IV steroids and nebulizers with improvement  Patient evaluated by PT- recommendation was made for STR, however authorization was denied  Patient also does not have any [facility] benefits  Care team meeting held with patient and long term care was discussed however patient declined  Patient was discharged home with steroid taper  Please see above list of diagnoses and related plan for additional information  Condition at Discharge: guarded    Discharge Day Visit / Exam:   Subjective:  No acute events overnight   Vitals: Blood Pressure: 123/65 (03/29/22 1516)  Pulse: 70 (03/29/22 1516)  Temperature: 99 4 °F (37 4 °C) (03/29/22 1516)  Temp Source: Oral (03/29/22 0740)  Respirations: 18 (03/29/22 1516)  Height: 5' 11" (180 3 cm) (03/24/22 1030)  Weight - Scale: 128 kg (282 lb 3 oz) (03/29/22 0600)  SpO2: 96 % (03/29/22 1516)  Exam:   Physical Exam  HENT:      Head: Normocephalic and atraumatic  Mouth/Throat:      Mouth: Mucous membranes are moist    Eyes:      Extraocular Movements: Extraocular movements intact  Cardiovascular:      Rate and Rhythm: Normal rate     Pulmonary:      Effort: Pulmonary effort is normal    Abdominal:      General: Abdomen is flat  Palpations: Abdomen is soft  Tenderness: There is no abdominal tenderness  Skin:     General: Skin is warm and dry  Neurological:      Mental Status: He is alert  Discharge instructions/Information to patient and family:   See after visit summary for information provided to patient and family  Provisions for Follow-Up Care:  See after visit summary for information related to follow-up care and any pertinent home health orders  Disposition:   Home    Planned Readmission: none     Discharge Statement:  I spent greater than 30 minutes discharging the patient  This time was spent on the day of discharge  I had direct contact with the patient on the day of discharge  Greater than 50% of the total time was spent examining patient, answering all patient questions, arranging and discussing plan of care with patient as well as directly providing post-discharge instructions  Additional time then spent on discharge activities  Discharge Medications:  See after visit summary for reconciled discharge medications provided to patient and/or family        **Please Note: This note may have been constructed using a voice recognition system**

## 2022-03-29 NOTE — ASSESSMENT & PLAN NOTE
· Follows up with Pain Management  · Continue Tylenol, diclofenac topical gel, gabapentin, tramadol  · PT/OT - rehab

## 2022-03-29 NOTE — CASE MANAGEMENT
Case Management Progress Note    Patient name Navjot Islas  Location 18 [facility] 220/2 [place] 220 MRN [id number]  : 1960 Date 3/29/2022       LOS (days): 5  Geometric Mean LOS (GMLOS) (days): 3 60  Days to GMLOS:-1 4        OBJECTIVE:        Current admission status: Inpatient  Preferred Pharmacy:   [address] [place] [address], [address] [place] [id number]  Phone: [phone] Fax: [phone]    Primary Care Provider: Marquis Rajesh MD    Primary Insurance: MEDICARE [address] [facility]  Secondary Insurance:     PROGRESS NOTE:    LOS Review:     Patient was initially recommended for STR  [facility] to [facility]  Auth request submitted Friday morning 3/25  On Monday 3/28, [employer] denied auth request  Peer to peer completed; however, request was still denied  Patient offered option to appeal discharge  Ongoing discussion held with patient re alternate plan to return home  Care Team meeting to be held this afternoon  Weekly Care Management Length of Stay Review     Current LOS: 5    Most Recent Labs:     Lab Results   Component Value Date/Time    SODIUM 127 (L) 2022 04:48 AM    K 4 0 2022 04:48 AM    CL 91 (L) 2022 04:48 AM    CO2 30 2022 04:48 AM    BUN 15 2022 04:48 AM    CREATININE 0 85 2022 04:48 AM    GLUC 165 (H) 2022 04:48 AM       Most Recent Vitals:   Vitals:    22 1516   BP: 123/65   Pulse: 70   Resp: 18   Temp: 99 4 °F (37 4 °C)   SpO2: 96%        Brief Care Summary: Patient admitted for treatment of an acute COPD exacerbation and encephalopathy  Pt treated with IV steroids and nebulizers  Symptoms improved  PT/OT recommended for STR  SNF auth submitted to [address]  Friday 3/25  [employer] denied auth request after peer to peer completed on 3/28  Per [address] , pt can appeal discharge; however, turn around time is up to 72hrs  Need for Continued Stay: SNF [facility] denied;  Dispo pending decision to pursue denial appeal vs transition home     Intended Discharge Disposition: Other: TBD    Expected Discharge Date: Today    Current Identified Barriers to Discharge: Insurance has no [facility] benefit  If pt does not win STR denial appeal; he will go home without services  Care Coordination Goals: Care Team meeting planned for today with patient

## 2022-03-29 NOTE — CASE MANAGEMENT
Case Management Progress Note    Patient name Amairani Seymour  Location 18 [facility] 220/[facility] MRN [id number]  : 1960 Date 3/29/2022       LOS (days): 5  Geometric Mean LOS (GMLOS) (days): 3 60  Days to GMLOS:-1 4        OBJECTIVE:        Current admission status: Inpatient  Preferred Pharmacy:   [address] [place] [address]  Phone: [phone] Fax: [phone]    Primary Care Provider: Kaden Salas MD    Primary Insurance: MEDICARE MISC REPLACEMENT  Secondary Insurance:     PROGRESS NOTE:  Care Team meeting with Dr Chuck Medina and myself spoke with pt about options moving forward with care  Pt was informed his insurance does cover [address], but pt does not want to placed in Long term care as he feels he does not need it  He was informed he would going home with no services  He states he wants to go home so he can fix his insurance and his plan is to get [place] [address] services after he sorts out his insurance  Cm placing transport request in [address] for pt to return home

## 2022-03-29 NOTE — CASE MANAGEMENT
Case Management Progress Note    Patient name Brittany Ahumada  Location 18 [facility] 220/2 [place] [address] MRN [id number]  : 1960 Date 3/29/2022       LOS (days): 5  Geometric Mean LOS (GMLOS) (days): 3 60  Days to GMLOS:-1 4        OBJECTIVE:        Current admission status: Inpatient  Preferred Pharmacy:   [address] [place] [address], [address] [place] [address]  Phone: [phone] Fax: [phone]    Primary Care Provider: Milton Marsh MD    Primary Insurance: MEDICARE MISC REPLACEMENT  Secondary Insurance:     PROGRESS NOTE:    Cm found out pt does not have any [facility] agency benefit  Cm spoke with An Agnieszka Villaseñor from [place] and she put Cm in touch with his [facility] care manager Glory Cornejo  Cm sent email to [facility] asking about what services pt can have at home since STR referral was denied  Care team meeting with Dr Vera Granger to discuss with pt happening today

## 2022-03-29 NOTE — DISCHARGE INSTRUCTIONS
COPD: Prevent Exacerbations   AMBULATORY CARE:   An exacerbation of COPD  means your symptoms get much worse very quickly and can become life-threatening  Exacerbations can be triggered by infections such as a cold or the flu  Lung irritants such as air pollution, dust, fumes, or smoke can also be triggers  Go to pulmonary rehabilitation (rehab) if directed:  Rehab is a program run by specialists who help you learn to manage COPD  Examples include a pulmonologist (lung specialist), dietitian, or exercise therapist  The specialists will help you make a plan to avoid triggers that cause an exacerbation  Protect yourself from germs:  Germs can get into your lungs and cause an infection  An infection in your lungs can create more mucus and make it harder to breathe  An infection can also create swelling in your airway and prevent air from getting in  You can decrease your risk for infection by doing the following:  · Wash your hands often with soap and water  Carry germ-killing gel with you  You can use the gel to clean your hands when soap and water are not available  · Do not touch your eyes, nose, or mouth unless you have washed your hands first     · Always cover your mouth when you cough  Cough into a tissue or your shirtsleeve so you do not spread germs from your hands  · Try to avoid people who have a cold or the flu  If you are sick, stay away from others as much as possible  · Ask about vaccines  Influenza (the flu) and pneumonia can become life-threatening for a person who has COPD  Get a flu vaccine each year as soon as it becomes available  The pneumonia vaccine may be given every 5 years, or as directed  Ask about other vaccines you may need and when to get them  Do not smoke:  Nicotine and other chemicals in cigarettes and cigars can cause lung damage and make your COPD worse  Ask your healthcare provider for information if you currently smoke and need help to quit   E-cigarettes or smokeless tobacco still contain nicotine  Talk to your healthcare provider before you use these products  Avoid secondhand smoke: This is smoke another person exhales  Even if you have never smoked or have quit, it is important to avoid secondhand smoke  This smoke can also cause lung damage or trigger an exacerbation  Use pursed-lip breathing any time you feel short of breath:   · Breathe in through your nose  Use the muscles in your abdomen to help fill your lungs with air  · Slowly breathe out through your mouth with your lips slightly puckered  You should make a quiet hissing sound as you breathe out  · Try to take 2 times as long to breathe out as to breathe in  This helps you get rid of as much air from your lungs as possible  · Repeat this exercise several times  When you are used to doing pursed-lip breathing, you can do it any time you need more air  Take your medicines as directed: Your healthcare provider may prescribe medicine to help you breathe easier  It is important that you take your medicines as directed to prevent or stop an exacerbation  Refill your medicines before you are out so that you do not miss a dose  Talk to your healthcare provider or pharmacist if you have any questions about how to take your medicines  · Short-acting bronchodilators  may be called rescue inhalers or relievers  They relieve sudden, severe symptoms and start to work right away  · Long-acting bronchodilators  may be called controllers or maintenance medicine  This medicine helps open your airway over time  It is used to prevent breathing problems  Long-acting bronchodilators should not be used to treat sudden, severe symptoms, such as trouble breathing  Avoid anything that makes your symptoms worse:  Stay out of high altitudes and places with high humidity  Stay inside, or cover your mouth and nose with a scarf when you are outside in cold weather   Stay inside on days when air pollution or pollen counts are high  Do not use aerosol sprays, such as deodorant, bug spray, and hairspray  Exercise daily:  Exercise for at least 20 minutes each day to help increase your energy and decrease shortness of breath  Talk to your healthcare provider about the best exercise plan for you  Drink liquids as directed: You may need to drink more liquid than usual  Liquid will help to keep your air passages moist and help you cough up mucus  Ask how much liquid to drink each day and which liquids are best for you  Follow up with your doctor as directed: You may need more tests  Your doctor may refer you to a specialist, depending on your needs  Some specialist services may be available through your pulmonary rehab program  Write down your questions so you remember to ask them during your visits  © Copyright [employer] 2022 Information is for End User's use only and may not be sold, redistributed or otherwise used for commercial purposes  All illustrations and images included in CareNotes® are the copyrighted property of A D A M , Inc  or [facility] Jared Lyles   The above information is an  only  It is not intended as medical advice for individual conditions or treatments  Talk to your doctor, nurse or pharmacist before following any medical regimen to see if it is safe and effective for you

## 2022-03-29 NOTE — ASSESSMENT & PLAN NOTE
BUN   Date Value Ref Range Status   03/28/2022 15 5 - 25 mg/dL Final   03/27/2022 15 5 - 25 mg/dL Final   03/26/2022 20 5 - 25 mg/dL Final     Creatinine   Date Value Ref Range Status   03/28/2022 0 91 0 60 - 1 30 mg/dL Final     Comment:     Standardized to IDMS reference method   03/27/2022 0 86 0 60 - 1 30 mg/dL Final     Comment:     Standardized to IDMS reference method   03/26/2022 1 03 0 60 - 1 30 mg/dL Final     Comment:     Standardized to IDMS reference method

## 2022-03-29 NOTE — ASSESSMENT & PLAN NOTE
Wt Readings from Last 3 Encounters:   03/28/22 126 kg (277 lb 12 5 oz)   02/07/22 (!) 137 kg (301 lb)   01/11/22 136 kg (300 lb)     Chest x-ray without any evidence of CHF  ProBNP low at 183  No evidence of volume overload clinically  Continue metoprolol, digoxin, spironolactone, torsemide  BMP noted     Check digoxin level  Fluid restriction, emphasized

## 2022-03-29 NOTE — PLAN OF CARE
Problem: PAIN - ADULT  Goal: Verbalizes/displays adequate comfort level or baseline comfort level  Description: Interventions:  - Encourage patient to monitor pain and request assistance  - Assess pain using appropriate pain scale  - Administer analgesics based on type and severity of pain and evaluate response  - Implement non-pharmacological measures as appropriate and evaluate response  - Consider cultural and social influences on pain and pain management  - Notify physician/advanced practitioner if interventions unsuccessful or patient reports new pain  3/29/2022 1742 by Jose L Fernandez RN  Outcome: Adequate for Discharge  3/29/2022 0941 by Jose L Fernandez RN  Outcome: Progressing     Problem: RESPIRATORY - ADULT  Goal: Achieves optimal ventilation and oxygenation  Description: INTERVENTIONS:  - Assess for changes in respiratory status  - Assess for changes in mentation and behavior  - Position to facilitate oxygenation and minimize respiratory effort  - Oxygen administered by appropriate delivery if ordered  - Encourage broncho-pulmonary hygiene including cough, deep breathe, Incentive Spirometry  - Assess and instruct to report SOB or any respiratory difficulty  - Respiratory Therapy support as indicated  3/29/2022 1742 by Jose L Fernandez RN  Outcome: Adequate for Discharge  3/29/2022 0941 by Jose L Fernandez RN  Outcome: Progressing

## 2022-03-29 NOTE — CASE MANAGEMENT
Case Management Progress Note    Patient name Terry Stratton  Location 18 [facility] 220/2 [place] [address] MRN [id number]  : 1960 Date 3/29/2022       LOS (days): 5  Geometric Mean LOS (GMLOS) (days): 3 60  Days to GMLOS:-1 3        OBJECTIVE:        Current admission status: Inpatient  Preferred Pharmacy:   [address] [place] [id number]  Phone: [phone] Fax: [phone]    Primary Care Provider: Maru Rios MD    Primary Insurance: MEDICARE [address] [facility]  Secondary Insurance:     PROGRESS NOTE:    [facility] does not accept pts insurance  [address] [place] [address] reviewing

## 2022-03-29 NOTE — CASE MANAGEMENT
Case Management Discharge Planning Note    Patient name Murali Prieto  Location 2 [facility] 220[facility] [id number]  : 1960 Date 3/29/2022       Current Admission Date: 3/24/2022  Current Admission Diagnosis:COPD with exacerbation [facility])   Patient Active Problem List    Diagnosis Date Noted    Chronic pain syndrome 2022    Foraminal stenosis of lumbar region 2022    Lumbar post-laminectomy syndrome 2022    Lumbar radiculopathy 2022    Shortness of breath 2022    SIRS (systemic inflammatory response syndrome) [facility] ) 2022    Dysuria 2021    Peripheral neuropathy 2021    Chronic left-sided low back pain with left-sided sciatica 2021    BMI 40 0-44 9, adult ([facility] ) 2021    Muscle weakness (generalized) 2021    Platelets decreased ([facility] ) 2021    Medicare annual wellness visit, subsequent 2021    Chronic congestive heart failure ([facility] ) 2020    Leukocytosis 10/29/2020    Hyperlipidemia 10/29/2020    Nutritional anemia, unspecified  10/29/2020    Chest pain 10/11/2020    Simple chronic bronchitis ([facility] ) 10/11/2020    PAF (paroxysmal atrial fibrillation) ([facility] ) 2020    Hyperglycemia 2020    Transition of care performed with sharing of clinical summary 2020    Obstructive sleep apnea 2020    Obesity (BMI 30-39 9) 2020    Stage 2 chronic kidney disease 2020    Hyponatremia 2020    COPD (chronic obstructive pulmonary disease) ([facility] ) 2020    Chronic a-fib ([facility] ) 2020    H/O vitamin D deficiency 10/28/2019    Dyslipidemia 2019    Type 2 diabetes mellitus with complication, with long-term current use of insulin ([facility] ) 2019    Restrictive ventilatory defect 2019    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 [facility]) 2019    Lung disease, restrictive 2018    Chronic respiratory failure with hypoxia ([place] [address] ) 10/31/2018    Coronary artery disease involving native heart 09/06/2017    Esophageal reflux 09/06/2017    Back ache 11/30/2016    SHAVONNE and COPD overlap syndrome ([place] [address] )     Morbid obesity ([place] [address] )     Uncontrolled type 2 diabetes mellitus with hyperglycemia ([place] [address] ) 09/02/2016    Fatigue 09/02/2016    Chronic reflux esophagitis 06/08/2016    Cough 01/13/2016    COPD with exacerbation ([place] [address] ) 01/13/2016    Bipolar affective disorder ([place] [address] )     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/25/2015    Erectile dysfunction of organic origin 08/25/2014    Benign essential hypertension 09/04/2012    Diabetic neuropathy ([place] [address] ) 09/04/2012    Panic disorder without agoraphobia 09/04/2012    Vitamin D deficiency 09/04/2012      LOS (days): 5  Geometric Mean LOS (GMLOS) (days): 3 60  Days to GMLOS:-1 2     OBJECTIVE:  Risk of Unplanned Readmission Score: 38         Current admission status: Inpatient   Preferred Pharmacy:   [address] [place], [place] [id number]  Phone: [phone] Fax: [phone]    Primary Care Provider: Flakito Briggs MD    Primary Insurance: MEDICARE MISC REPLACEMENT  Secondary Insurance:     DISCHARGE DETAILS:    Discharge planning discussed with[de-identified] patient  Freedom of Choice: Yes  Comments - Freedom of Choice: pt understand FOC and would now like to go home with [facility], he has used [facility] in the past and would like to go with them  CM contacted family/caregiver?: No- see comments  Were Treatment Team discharge recommendations reviewed with patient/caregiver?: Yes  Did patient/caregiver verbalize understanding of patient care needs?: Yes            [address]         Is the patient interested in [facility] at discharge?: Yes  [address] requested[de-identified] [address] Name[de-identified] Servando Borrego Provider[de-identified] PCP  Home Health Services Needed[de-identified] Strengthening/Theraputic Exercises to Improve Function,Gait/ADL Training,Evaluate Functional Status and Safety,COPD Management  Homebound Criteria Met[de-identified] Uses an Assist Device (i e  cane, walker, etc)  Supporting Clincal Findings[de-identified] Dyspnea with Exertion,Requires Oxygen    DME Referral Provided  Referral made for DME?: No         Would you like to participate in our [address] service program?  : No - Declined    Treatment Team Recommendation: Short Term Rehab (Juanjo Paz denied for STR from insurance)  Discharge Destination Plan[de-identified] Home with [facility] discussed with pt difficulty of insurance denying STR stay  Team performed peer to peer and next step would be to appeal denial   Pt stated at this point he would like to return home with [facility] referral sent to [facility] as he has used it in the past

## 2022-03-29 NOTE — ASSESSMENT & PLAN NOTE
Lab Results   Component Value Date    HGBA1C 8 6 (H) 03/25/2022       Recent Labs     03/28/22  0724 03/28/22  1123 03/28/22  1622 03/28/22  2105   POCGLU 90 119 249* 241*       Blood Sugar Average: Last 72 hrs:  (P) 245  3   Hyperglycemia secondary to steroids  · Home regimen : Lantus 75 units b i d   With pre meal insulin 35 units Q a c   · Continue home regimen with sliding scale  · Monitor on steroids

## 2022-03-30 ENCOUNTER — PATIENT OUTREACH (OUTPATIENT)
Dept: CASE MANAGEMENT | Facility: OTHER | Age: 62
End: 2022-03-30

## 2022-03-30 DIAGNOSIS — E11.65 TYPE 2 DIABETES MELLITUS WITH HYPERGLYCEMIA, WITH LONG-TERM CURRENT USE OF INSULIN (HCC): ICD-10-CM

## 2022-03-30 DIAGNOSIS — Z79.4 TYPE 2 DIABETES MELLITUS WITH HYPERGLYCEMIA, WITH LONG-TERM CURRENT USE OF INSULIN (HCC): ICD-10-CM

## 2022-03-30 RX ORDER — INSULIN GLARGINE 100 [IU]/ML
INJECTION, SOLUTION SUBCUTANEOUS
Qty: 105 ML | Refills: 3 | Status: SHIPPED | OUTPATIENT
Start: 2022-03-30 | End: 2022-05-20 | Stop reason: SDUPTHER

## 2022-03-30 NOTE — PROGRESS NOTES
Bundle patient for CHF was admitted to [facility] with a COPD exacerbation  No evidence of fluid overload  Initial discharge plan was for short term rehab, but authorization was denied by insurance  His current insurance coverage does not include a home health benefit  Patient stated he would discharge home without services until he could sort out his insurance  Left a detailed voicemail for the patient to follow up on his hospital discharge

## 2022-03-31 ENCOUNTER — TRANSITIONAL CARE MANAGEMENT (OUTPATIENT)
Dept: FAMILY MEDICINE CLINIC | Facility: CLINIC | Age: 62
End: 2022-03-31

## 2022-03-31 ENCOUNTER — PATIENT OUTREACH (OUTPATIENT)
Dept: FAMILY MEDICINE CLINIC | Facility: CLINIC | Age: 62
End: 2022-03-31

## 2022-03-31 DIAGNOSIS — E87.8 ELECTROLYTE ABNORMALITY: ICD-10-CM

## 2022-03-31 DIAGNOSIS — I48.91 ATRIAL FIBRILLATION WITH RVR (HCC): ICD-10-CM

## 2022-03-31 RX ORDER — POTASSIUM CHLORIDE 20 MEQ/1
TABLET, EXTENDED RELEASE ORAL
Qty: 90 TABLET | Refills: 0 | Status: SHIPPED | OUTPATIENT
Start: 2022-03-31

## 2022-03-31 RX ORDER — DIGOXIN 250 MCG
TABLET ORAL
Qty: 30 TABLET | Refills: 5 | Status: SHIPPED | OUTPATIENT
Start: 2022-03-31

## 2022-03-31 NOTE — PROGRESS NOTES
SW spoke with IP KENDAL Painting Reasons regarding discharge plan from hospital and pt getting home health services, and requested SW follow up to confirm services  MANSI informed [facility] that Kitty Larson had been in touch with pt as of yesterday  SW further reviewed and saw that pt was working on figuring out insurance coverage  MANSI messaged [facility] for clarification on what pt is and can get  Will follow up as needed

## 2022-03-31 NOTE — CASE MANAGEMENT
Case Management Progress Note    Patient name Nataly Louis  Location 18 [facility] 220/2 [place] [address] MRN [id number]  : 1960 Date 3/31/2022       LOS (days): 5  Geometric Mean LOS (GMLOS) (days): 3 60  Days to GMLOS:-1 5        OBJECTIVE:        Current admission status: Inpatient  Preferred Pharmacy:   [address] [place] [address]  Phone: [phone] Fax: [phone]    Primary Care Provider: Jaleel Moreno MD    Primary Insurance: MEDICARE MISC REPLACEMENT  Secondary Insurance:     PROGRESS NOTE:    Cm spoke with Lori Fairchild, Lori Fairchild is going to try and arrange HHA for pt to receive help at home

## 2022-04-01 ENCOUNTER — TELEPHONE (OUTPATIENT)
Dept: PAIN MEDICINE | Facility: CLINIC | Age: 62
End: 2022-04-01

## 2022-04-01 DIAGNOSIS — E11.65 UNCONTROLLED TYPE 2 DIABETES MELLITUS WITH HYPERGLYCEMIA (HCC): Chronic | ICD-10-CM

## 2022-04-01 RX ORDER — INSULIN ASPART 100 [IU]/ML
INJECTION, SOLUTION INTRAVENOUS; SUBCUTANEOUS
Qty: 30 ML | Refills: 3 | Status: ON HOLD | OUTPATIENT
Start: 2022-04-01 | End: 2022-06-21 | Stop reason: SDUPTHER

## 2022-04-01 NOTE — TELEPHONE ENCOUNTER
Called Alonzo to see where he stands with his insurance  Currently he has [address] (pending auth for TFOZIEL) and Medicare  Voice mail box was full

## 2022-04-04 ENCOUNTER — TELEPHONE (OUTPATIENT)
Dept: OBGYN CLINIC | Facility: CLINIC | Age: 62
End: 2022-04-04

## 2022-04-04 NOTE — TELEPHONE ENCOUNTER
Patient called back stating there was someone who called his friend concerning his Eliquis  He is having some issues with his phone but please try to call back

## 2022-04-04 NOTE — UTILIZATION REVIEW
Notification of Discharge   This is a Notification of Discharge from our facility [address]  Please be advised that this patient has been discharge from our facility  Below you will find the admission and discharge date and time including the patients disposition  UTILIZATION REVIEW CONTACT:  Destini Peters  Utilization   Network Utilization Review Department  Phone: [phone] x carefully listen to the prompts  All voicemails are confidential   Email: [email or web address]     PHYSICIAN ADVISORY SERVICES:  FOR [id number] REVIEW - MEDICAL NECESSITY DENIAL  Phone: [phone]  Fax: [phone]  Email: [email or web address]     PRESENTATION DATE: 3/24/2022 10:29 AM  OBERVATION ADMISSION DATE:   INPATIENT ADMISSION DATE: 3/24/22  3:06 PM   DISCHARGE DATE: 3/29/2022  5:52 PM  DISPOSITION: Home/Self Care Home/Self Care      IMPORTANT INFORMATION:  Send all requests for admission clinical reviews, approved or denied determinations and any other requests to dedicated fax number below belonging to the campus where the patient is receiving treatment   List of dedicated fax numbers:  [address] DENIALS (Administrative/Medical Necessity) [phone]   [address] (Maternity/NICU/Pediatrics) [phone]   Al[place] Congress [phone]   130 [facility] [phone]   [address] [place] [address] [phone]   [address] [phone]   [facility]  [phone]   2205 [facility]  [address] [phone]

## 2022-04-06 NOTE — TELEPHONE ENCOUNTER
I saw able to set up transportation for his procedure on 4/15/22 with Florentino Closs ([address]) [phone]   will be at 650am on 4/15/22  Return time is 945  I spoke with Mariah Ng  Confirmation # [id number]  Mariah Ng will call the pt  I will also call Alonzo to let him know   The phone line is a fast busy

## 2022-04-06 NOTE — TELEPHONE ENCOUNTER
If Alonzo calls in for anything, please make sure the nurse team speaks with him concerning holding his medication prior to his procedure

## 2022-04-07 ENCOUNTER — TELEPHONE (OUTPATIENT)
Dept: PULMONOLOGY | Facility: MEDICAL CENTER | Age: 62
End: 2022-04-07

## 2022-04-14 ENCOUNTER — PATIENT OUTREACH (OUTPATIENT)
Dept: FAMILY MEDICINE CLINIC | Facility: CLINIC | Age: 62
End: 2022-04-14

## 2022-04-14 ENCOUNTER — PATIENT OUTREACH (OUTPATIENT)
Dept: CASE MANAGEMENT | Facility: HOSPITAL | Age: 62
End: 2022-04-14

## 2022-04-14 NOTE — PROGRESS NOTES
Heart Failure Outpatient Care Coordinator Note: Chart notes reviewed  Patient recently admitted with COPD exacerbation  He is scheduled for spinal surgery tomorrow and transportation has been arranged per Spine and Pain notes  Uncertain if home care was ever established as care management notes state there were some insurance issues  Reached out to [facility] from last stay and she states Elias gita was trying to get him an aid  Patient does not answer phone  I will continue to follow chart notes for remainder of Bundle episode

## 2022-04-14 NOTE — PROGRESS NOTES
SW received message from [place] [id number] inquiring about pt getting home health  SW spoke with inpatient care management team at [facility] about this pt  Per IP KENDAL Boyd, [facility] [employer] did not cover home health  Pt decided he would speak with his insurance about possibly getting home health coverage  IP KENDAL Travis requested that OP follow up with OP RN respiratory therapist about pt going for outpatient tx for his COPD       MANSI sent inbasket to both RN Dominick Pulliam and CARLEEN Clay

## 2022-04-15 ENCOUNTER — HOSPITAL ENCOUNTER (OUTPATIENT)
Facility: AMBULARY SURGERY CENTER | Age: 62
Setting detail: OUTPATIENT SURGERY
Discharge: HOME | End: 2022-04-15
Attending: ANESTHESIOLOGY | Admitting: ANESTHESIOLOGY
Payer: COMMERCIAL

## 2022-04-15 ENCOUNTER — APPOINTMENT (OUTPATIENT)
Dept: RADIOLOGY | Facility: HOSPITAL | Age: 62
End: 2022-04-15
Payer: COMMERCIAL

## 2022-04-15 VITALS
SYSTOLIC BLOOD PRESSURE: 162 MMHG | RESPIRATION RATE: 18 BRPM | TEMPERATURE: 96.6 F | HEART RATE: 65 BPM | OXYGEN SATURATION: 100 % | DIASTOLIC BLOOD PRESSURE: 76 MMHG

## 2022-04-15 DIAGNOSIS — J44.9 CHRONIC OBSTRUCTIVE PULMONARY DISEASE, UNSPECIFIED COPD TYPE (HCC): Primary | ICD-10-CM

## 2022-04-15 LAB — GLUCOSE SERPL-MCNC: 151 MG/DL (ref 65–140)

## 2022-04-15 PROCEDURE — 82948 REAGENT STRIP/BLOOD GLUCOSE: CPT

## 2022-04-15 PROCEDURE — 64484 NJX AA&/STRD TFRM EPI L/S EA: CPT | Performed by: ANESTHESIOLOGY

## 2022-04-15 PROCEDURE — 62323 NJX INTERLAMINAR LMBR/SAC: CPT

## 2022-04-15 PROCEDURE — 64483 NJX AA&/STRD TFRM EPI L/S 1: CPT | Performed by: ANESTHESIOLOGY

## 2022-04-15 RX ORDER — METHYLPREDNISOLONE ACETATE 80 MG/ML
INJECTION, SUSPENSION INTRA-ARTICULAR; INTRALESIONAL; INTRAMUSCULAR; SOFT TISSUE AS NEEDED
Status: DISCONTINUED | OUTPATIENT
Start: 2022-04-15 | End: 2022-04-15 | Stop reason: HOSPADM

## 2022-04-15 RX ORDER — BUPIVACAINE HYDROCHLORIDE 2.5 MG/ML
INJECTION, SOLUTION EPIDURAL; INFILTRATION; INTRACAUDAL AS NEEDED
Status: DISCONTINUED | OUTPATIENT
Start: 2022-04-15 | End: 2022-04-15 | Stop reason: HOSPADM

## 2022-04-15 RX ORDER — LIDOCAINE WITH 8.4% SOD BICARB 0.9%(10ML)
SYRINGE (ML) INJECTION AS NEEDED
Status: DISCONTINUED | OUTPATIENT
Start: 2022-04-15 | End: 2022-04-15 | Stop reason: HOSPADM

## 2022-04-15 NOTE — DISCHARGE INSTRUCTIONS
Epidural Steroid Injection   WHAT YOU NEED TO KNOW:   An epidural steroid injection (OZIEL) is a procedure to inject steroid medicine into the epidural space  The epidural space is between your spinal cord and vertebrae  Steroids reduce inflammation and fluid buildup in your spine that may be causing pain  You may be given pain medicine along with the steroids  ACTIVITY  · Do not drive or operate machinery today  · No strenuous activity today - bending, lifting, etc   · You may resume normal activites starting tomorrow - start slowly and as tolerated  · You may shower today, but no tub baths or hot tubs  · You may have numbness for several hours from the local anesthetic  Please use caution and common sense, especially with weight-bearing activities  CARE OF THE INJECTION SITE  · If you have soreness or pain, apply ice to the area today (20 minutes on/20 minutes off)  · Starting tomorrow, you may use warm, moist heat or ice if needed  · You may have an increase or change in your discomfort for 36-48 hours after your treatment  · Apply ice and continue with any pain medication you have been prescribed  · Notify [facility] if you have any of the following: redness, drainage, swelling, headache, stiff neck or fever above 100°F     SPECIAL INSTRUCTIONS  · Our office will contact you in approximately 7 days for a progress report  MEDICATIONS  · Continue to take all routine medications  · Our office may have instructed you to hold some medications  As no general anesthesia was used in today's procedure, you should not experience any side effects related to anesthesia  If you have a problem specifically related to your procedure, please call our office at [phone]  Problems not related to your procedure should be directed to your primary care physician

## 2022-04-15 NOTE — OP NOTE
ATTENDING PHYSICIAN:  Vesta Trevizo MD     PROCEDURE:  1  Left L5 transforaminal epidural steroid injection under fluoroscopic guidance  2  Left S1 transforaminal epidural steroid injection under fluoroscopic guidance  PRE-PROCEDURE DIAGNOSIS:  Low back pain and left lower extremity radiculopathy  POST-PROCEDURE DIAGNOSIS:  Low back pain and left lower extremity radiculopathy  ANESTHESIA:  Local     ESTIMATED BLOOD LOSS:  Minimal     COMPLICATIONS:  None  LOCATION:  [facility]  CONSENT:  Today's procedure, its potential benefits as well as its risks and potential side effects were reviewed  Discussed risks of the procedure including bleeding, infection, nerve irritation or damage, reactions to the medications, weakness, headache, failure of the pain to improve, and potential worsening of the pain were explained to the patient who verbalized understanding and who wished to proceed  Written informed consent was thereby obtained  DESCRIPTION OF THE PROCEDURE:  After written informed consent was obtained, the patient was taken to the fluoroscopy suite and placed in the prone position  Anatomical landmarks were identified by way of fluoroscopy in multiple views  The skin of the lumbar region was prepped using antiseptic and draped in the usual sterile fashion  Strict aseptic technique was utilized  The skin and subcutaneous tissues at the needle entry site were infiltrated with a total of 5 mL of 1% preservative-free lidocaine using a 25-gauge 1-1/2-inch needle  22-gauge needles were then incrementally advanced under fluoroscopic guidance in the oblique view into the neural foramina as mentioned above  Proper placement into each of the neural foramen was confirmed with fluoroscopy in both the lateral and AP views  After negative aspiration for CSF or heme, contrast was injected, which delineated the nerve roots and the epidural space under fluoroscopy in the AP view  There was only a transient pressure paresthesia that resolved immediately upon injection  After negative aspiration, a 2 mL of a 4 mL injectate consisting of 3 mL of preservative-free 0 25% bupivacaine and 1 mL of Depo-Medrol 80 mg/mL was slowly injected into each of the needles as delineated above  The patient tolerated the procedure well and all needles were removed intact  Hemostasis was maintained  There were no apparent paresthesias or complications  The skin was wiped clean and a Band-Aid was placed as appropriate  The patient was monitored for an appropriate period of time and remained hemodynamically stable following the procedure  The patient was ultimately discharged to home with supervision in good condition and instructed to call the office in approximately 7-10 days with an update or sooner as warranted  Discharge instructions were provided  I was present for and participated in all key and critical portions of this procedure      Princess Tom MD  4/15/2022  11:18 AM

## 2022-04-17 ENCOUNTER — HOSPITAL ENCOUNTER (INPATIENT)
Facility: HOSPITAL | Age: 62
LOS: 4 days | Discharge: HOME HEALTH | DRG: 552 | End: 2022-04-21
Attending: EMERGENCY MEDICINE | Admitting: STUDENT IN AN ORGANIZED HEALTH CARE EDUCATION/TRAINING PROGRAM
Payer: COMMERCIAL

## 2022-04-17 ENCOUNTER — APPOINTMENT (EMERGENCY)
Dept: RADIOLOGY | Facility: HOSPITAL | Age: 62
DRG: 552 | End: 2022-04-17
Payer: COMMERCIAL

## 2022-04-17 DIAGNOSIS — R05.9 COUGH: ICD-10-CM

## 2022-04-17 DIAGNOSIS — E78.5 DYSLIPIDEMIA: ICD-10-CM

## 2022-04-17 DIAGNOSIS — M54.16 LUMBAR RADICULOPATHY: ICD-10-CM

## 2022-04-17 DIAGNOSIS — F99 PSYCHIATRIC DISORDER: ICD-10-CM

## 2022-04-17 DIAGNOSIS — G89.4 CHRONIC PAIN SYNDROME: ICD-10-CM

## 2022-04-17 DIAGNOSIS — M96.1 LUMBAR POST-LAMINECTOMY SYNDROME: ICD-10-CM

## 2022-04-17 DIAGNOSIS — D72.829 LEUKOCYTOSIS, UNSPECIFIED TYPE: ICD-10-CM

## 2022-04-17 DIAGNOSIS — Z74.1 ASSISTANCE NEEDED FOR MOBILITY: Primary | ICD-10-CM

## 2022-04-17 LAB
ALBUMIN SERPL BCP-MCNC: 4 G/DL (ref 3.5–5)
ALP SERPL-CCNC: 48 U/L (ref 46–116)
ALT SERPL W P-5'-P-CCNC: 40 U/L (ref 12–78)
ANION GAP SERPL CALCULATED.3IONS-SCNC: 10 MMOL/L (ref 4–13)
AST SERPL W P-5'-P-CCNC: 19 U/L (ref 5–45)
BASOPHILS # BLD MANUAL: 0 THOUSAND/UL (ref 0–0.1)
BASOPHILS NFR MAR MANUAL: 0 % (ref 0–1)
BILIRUB SERPL-MCNC: 0.66 MG/DL (ref 0.2–1)
BUN SERPL-MCNC: 11 MG/DL (ref 5–25)
CALCIUM SERPL-MCNC: 8.9 MG/DL (ref 8.3–10.1)
CHLORIDE SERPL-SCNC: 94 MMOL/L (ref 100–108)
CO2 SERPL-SCNC: 24 MMOL/L (ref 21–32)
CREAT SERPL-MCNC: 0.99 MG/DL (ref 0.6–1.3)
EOSINOPHIL # BLD MANUAL: 0 THOUSAND/UL (ref 0–0.4)
EOSINOPHIL NFR BLD MANUAL: 0 % (ref 0–6)
ERYTHROCYTE [DISTWIDTH] IN BLOOD BY AUTOMATED COUNT: 13.4 % (ref 11.6–15.1)
FLUAV RNA RESP QL NAA+PROBE: NEGATIVE
FLUBV RNA RESP QL NAA+PROBE: NEGATIVE
GFR SERPL CREATININE-BSD FRML MDRD: 81 ML/MIN/1.73SQ M
GLUCOSE SERPL-MCNC: 183 MG/DL (ref 65–140)
GLUCOSE SERPL-MCNC: 190 MG/DL (ref 65–140)
HCT VFR BLD AUTO: 39.5 % (ref 36.5–49.3)
HGB BLD-MCNC: 13.2 G/DL (ref 12–17)
LYMPHOCYTES # BLD AUTO: 32 % (ref 14–44)
LYMPHOCYTES # BLD AUTO: 5.08 THOUSAND/UL (ref 0.6–4.47)
MAGNESIUM SERPL-MCNC: 1.8 MG/DL (ref 1.6–2.6)
MCH RBC QN AUTO: 30.8 PG (ref 26.8–34.3)
MCHC RBC AUTO-ENTMCNC: 33.4 G/DL (ref 31.4–37.4)
MCV RBC AUTO: 92 FL (ref 82–98)
MONOCYTES # BLD AUTO: 0.64 THOUSAND/UL (ref 0–1.22)
MONOCYTES NFR BLD: 4 % (ref 4–12)
MYELOCYTES NFR BLD MANUAL: 1 % (ref 0–1)
NEUTROPHILS # BLD MANUAL: 8.42 THOUSAND/UL (ref 1.85–7.62)
NEUTS SEG NFR BLD AUTO: 53 % (ref 43–75)
PHOSPHATE SERPL-MCNC: 2.9 MG/DL (ref 2.3–4.1)
PLATELET # BLD AUTO: 187 THOUSANDS/UL (ref 149–390)
PLATELET BLD QL SMEAR: ADEQUATE
PMV BLD AUTO: 9.1 FL (ref 8.9–12.7)
POTASSIUM SERPL-SCNC: 4.8 MMOL/L (ref 3.5–5.3)
PROT SERPL-MCNC: 6.9 G/DL (ref 6.4–8.2)
RBC # BLD AUTO: 4.29 MILLION/UL (ref 3.88–5.62)
RBC MORPH BLD: NORMAL
RSV RNA RESP QL NAA+PROBE: NEGATIVE
SARS-COV-2 RNA RESP QL NAA+PROBE: NEGATIVE
SODIUM SERPL-SCNC: 128 MMOL/L (ref 136–145)
VARIANT LYMPHS # BLD AUTO: 10 %
WBC # BLD AUTO: 15.88 THOUSAND/UL (ref 4.31–10.16)

## 2022-04-17 PROCEDURE — 82948 REAGENT STRIP/BLOOD GLUCOSE: CPT

## 2022-04-17 PROCEDURE — 0241U HB NFCT DS VIR RESP RNA 4 TRGT: CPT | Performed by: PHYSICIAN ASSISTANT

## 2022-04-17 PROCEDURE — 85007 BL SMEAR W/DIFF WBC COUNT: CPT | Performed by: PHYSICIAN ASSISTANT

## 2022-04-17 PROCEDURE — G1004 CDSM NDSC: HCPCS

## 2022-04-17 PROCEDURE — 80053 COMPREHEN METABOLIC PANEL: CPT | Performed by: PHYSICIAN ASSISTANT

## 2022-04-17 PROCEDURE — 36415 COLL VENOUS BLD VENIPUNCTURE: CPT | Performed by: PHYSICIAN ASSISTANT

## 2022-04-17 PROCEDURE — 74177 CT ABD & PELVIS W/CONTRAST: CPT

## 2022-04-17 PROCEDURE — 84100 ASSAY OF PHOSPHORUS: CPT | Performed by: PHYSICIAN ASSISTANT

## 2022-04-17 PROCEDURE — 85027 COMPLETE CBC AUTOMATED: CPT | Performed by: PHYSICIAN ASSISTANT

## 2022-04-17 PROCEDURE — 71260 CT THORAX DX C+: CPT

## 2022-04-17 PROCEDURE — 94660 CPAP INITIATION&MGMT: CPT

## 2022-04-17 PROCEDURE — 99285 EMERGENCY DEPT VISIT HI MDM: CPT | Performed by: PHYSICIAN ASSISTANT

## 2022-04-17 PROCEDURE — 99223 1ST HOSP IP/OBS HIGH 75: CPT | Performed by: STUDENT IN AN ORGANIZED HEALTH CARE EDUCATION/TRAINING PROGRAM

## 2022-04-17 PROCEDURE — 83735 ASSAY OF MAGNESIUM: CPT | Performed by: PHYSICIAN ASSISTANT

## 2022-04-17 PROCEDURE — 99285 EMERGENCY DEPT VISIT HI MDM: CPT

## 2022-04-17 PROCEDURE — 96374 THER/PROPH/DIAG INJ IV PUSH: CPT

## 2022-04-17 RX ORDER — FLUTICASONE FUROATE AND VILANTEROL 100; 25 UG/1; UG/1
1 POWDER RESPIRATORY (INHALATION) DAILY
Status: DISCONTINUED | OUTPATIENT
Start: 2022-04-18 | End: 2022-04-21 | Stop reason: HOSPADM

## 2022-04-17 RX ORDER — OMEGA-3-ACID ETHYL ESTERS 1 G/1
2 CAPSULE, LIQUID FILLED ORAL DAILY
Status: DISCONTINUED | OUTPATIENT
Start: 2022-04-18 | End: 2022-04-17 | Stop reason: SDUPTHER

## 2022-04-17 RX ORDER — LOSARTAN POTASSIUM 50 MG/1
50 TABLET ORAL DAILY
Status: DISCONTINUED | OUTPATIENT
Start: 2022-04-18 | End: 2022-04-21 | Stop reason: HOSPADM

## 2022-04-17 RX ORDER — QUETIAPINE FUMARATE 50 MG/1
100 TABLET, EXTENDED RELEASE ORAL EVERY MORNING
Status: DISCONTINUED | OUTPATIENT
Start: 2022-04-18 | End: 2022-04-21 | Stop reason: HOSPADM

## 2022-04-17 RX ORDER — MELATONIN
1000 DAILY
Status: DISCONTINUED | OUTPATIENT
Start: 2022-04-18 | End: 2022-04-21 | Stop reason: HOSPADM

## 2022-04-17 RX ORDER — ACETAMINOPHEN 325 MG/1
650 TABLET ORAL EVERY 6 HOURS PRN
Status: DISCONTINUED | OUTPATIENT
Start: 2022-04-17 | End: 2022-04-21 | Stop reason: HOSPADM

## 2022-04-17 RX ORDER — GUAIFENESIN/DEXTROMETHORPHAN 100-10MG/5
5 SYRUP ORAL EVERY 8 HOURS PRN
Status: DISCONTINUED | OUTPATIENT
Start: 2022-04-17 | End: 2022-04-21 | Stop reason: HOSPADM

## 2022-04-17 RX ORDER — BUSPIRONE HYDROCHLORIDE 10 MG/1
10 TABLET ORAL 2 TIMES DAILY
Status: DISCONTINUED | OUTPATIENT
Start: 2022-04-17 | End: 2022-04-21 | Stop reason: HOSPADM

## 2022-04-17 RX ORDER — ALBUTEROL SULFATE 2.5 MG/3ML
2.5 SOLUTION RESPIRATORY (INHALATION) EVERY 6 HOURS PRN
Status: DISCONTINUED | OUTPATIENT
Start: 2022-04-17 | End: 2022-04-19

## 2022-04-17 RX ORDER — CHLORAL HYDRATE 500 MG
2000 CAPSULE ORAL DAILY
Status: DISCONTINUED | OUTPATIENT
Start: 2022-04-18 | End: 2022-04-21 | Stop reason: HOSPADM

## 2022-04-17 RX ORDER — METOPROLOL SUCCINATE 100 MG/1
200 TABLET, EXTENDED RELEASE ORAL DAILY
Status: DISCONTINUED | OUTPATIENT
Start: 2022-04-18 | End: 2022-04-21 | Stop reason: HOSPADM

## 2022-04-17 RX ORDER — SPIRONOLACTONE 25 MG/1
25 TABLET ORAL DAILY
Status: DISCONTINUED | OUTPATIENT
Start: 2022-04-18 | End: 2022-04-21 | Stop reason: HOSPADM

## 2022-04-17 RX ORDER — DIGOXIN 125 MCG
250 TABLET ORAL DAILY
Status: DISCONTINUED | OUTPATIENT
Start: 2022-04-18 | End: 2022-04-21 | Stop reason: HOSPADM

## 2022-04-17 RX ORDER — ATORVASTATIN CALCIUM 80 MG/1
80 TABLET, FILM COATED ORAL DAILY
Status: DISCONTINUED | OUTPATIENT
Start: 2022-04-18 | End: 2022-04-21 | Stop reason: HOSPADM

## 2022-04-17 RX ORDER — GABAPENTIN 300 MG/1
300 CAPSULE ORAL 3 TIMES DAILY
Status: DISCONTINUED | OUTPATIENT
Start: 2022-04-17 | End: 2022-04-21 | Stop reason: HOSPADM

## 2022-04-17 RX ORDER — QUETIAPINE FUMARATE 100 MG/1
300 TABLET, FILM COATED ORAL
Status: DISCONTINUED | OUTPATIENT
Start: 2022-04-17 | End: 2022-04-21 | Stop reason: HOSPADM

## 2022-04-17 RX ORDER — POTASSIUM CHLORIDE 20 MEQ/1
20 TABLET, EXTENDED RELEASE ORAL DAILY
Status: DISCONTINUED | OUTPATIENT
Start: 2022-04-18 | End: 2022-04-21 | Stop reason: HOSPADM

## 2022-04-17 RX ORDER — ALPRAZOLAM 0.5 MG/1
2 TABLET ORAL
Status: DISCONTINUED | OUTPATIENT
Start: 2022-04-17 | End: 2022-04-21 | Stop reason: HOSPADM

## 2022-04-17 RX ORDER — TORSEMIDE 20 MG/1
40 TABLET ORAL DAILY
Status: DISCONTINUED | OUTPATIENT
Start: 2022-04-18 | End: 2022-04-21 | Stop reason: HOSPADM

## 2022-04-17 RX ORDER — KETOROLAC TROMETHAMINE 30 MG/ML
15 INJECTION, SOLUTION INTRAMUSCULAR; INTRAVENOUS ONCE
Status: COMPLETED | OUTPATIENT
Start: 2022-04-17 | End: 2022-04-17

## 2022-04-17 RX ORDER — HYDROMORPHONE HCL/PF 1 MG/ML
1 SYRINGE (ML) INJECTION ONCE
Status: COMPLETED | OUTPATIENT
Start: 2022-04-17 | End: 2022-04-17

## 2022-04-17 RX ORDER — PANTOPRAZOLE SODIUM 20 MG/1
20 TABLET, DELAYED RELEASE ORAL
Status: DISCONTINUED | OUTPATIENT
Start: 2022-04-18 | End: 2022-04-21 | Stop reason: HOSPADM

## 2022-04-17 RX ORDER — INSULIN GLARGINE 100 [IU]/ML
75 INJECTION, SOLUTION SUBCUTANEOUS EVERY 12 HOURS SCHEDULED
Status: DISCONTINUED | OUTPATIENT
Start: 2022-04-17 | End: 2022-04-21 | Stop reason: HOSPADM

## 2022-04-17 RX ORDER — ASPIRIN 81 MG/1
81 TABLET ORAL DAILY
Status: DISCONTINUED | OUTPATIENT
Start: 2022-04-18 | End: 2022-04-21 | Stop reason: HOSPADM

## 2022-04-17 RX ORDER — LIDOCAINE 40 MG/G
CREAM TOPICAL DAILY PRN
Status: DISCONTINUED | OUTPATIENT
Start: 2022-04-17 | End: 2022-04-19

## 2022-04-17 RX ADMIN — INSULIN GLARGINE 75 UNITS: 100 INJECTION, SOLUTION SUBCUTANEOUS at 20:42

## 2022-04-17 RX ADMIN — ALPRAZOLAM 2 MG: 0.5 TABLET ORAL at 22:27

## 2022-04-17 RX ADMIN — BUSPIRONE HYDROCHLORIDE 10 MG: 10 TABLET ORAL at 20:42

## 2022-04-17 RX ADMIN — IOHEXOL 100 ML: 350 INJECTION, SOLUTION INTRAVENOUS at 16:57

## 2022-04-17 RX ADMIN — DICLOFENAC SODIUM TOPICAL GEL, 1%, 2 G: 10 GEL TOPICAL at 20:43

## 2022-04-17 RX ADMIN — QUETIAPINE FUMARATE 300 MG: 100 TABLET ORAL at 22:27

## 2022-04-17 RX ADMIN — KETOROLAC TROMETHAMINE 15 MG: 30 INJECTION, SOLUTION INTRAMUSCULAR at 20:43

## 2022-04-17 RX ADMIN — HYDROMORPHONE HYDROCHLORIDE 1 MG: 1 INJECTION, SOLUTION INTRAMUSCULAR; INTRAVENOUS; SUBCUTANEOUS at 17:06

## 2022-04-17 RX ADMIN — APIXABAN 5 MG: 5 TABLET, FILM COATED ORAL at 20:42

## 2022-04-17 RX ADMIN — GABAPENTIN 300 MG: 300 CAPSULE ORAL at 20:42

## 2022-04-17 NOTE — ASSESSMENT & PLAN NOTE
Lab Results   Component Value Date    HGBA1C 8 6 (H) 03/25/2022       Recent Labs     04/15/22  1102   POCGLU 151*       Blood Sugar Average: Last 72 hrs:    Continue Lantus and Humalog, Victoza  Diabetic diet  Gabapentin for neuropathy

## 2022-04-17 NOTE — ED PROVIDER NOTES
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 mg tablet TAKE ONE TABLET BY MOUTH DAILY  Qty: 30 tablet, Refills: 5    Associated Diagnoses: Atrial fibrillation with RVR ([facility])      Eliquis 5 MG TAKE ONE TABLET BY MOUTH TWICE DAILY  Qty: 180 tablet, Refills: 3    Associated Diagnoses: Atrial fibrillation with RVR ([facility])      gabapentin (Neurontin) 300 mg capsule Take 1 capsule (300 mg total) by mouth 3 (three) times a day  Qty: 90 capsule, Refills: 0    Associated Diagnoses: Chronic pain syndrome; Chronic left-sided low back pain with left-sided sciatica;  Foraminal stenosis of lumbar region; Lumbar post-laminectomy syndrome; Lumbar radiculopathy      losartan (COZAAR) 50 mg tablet TAKE ONE TABLET BY MOUTH DAILY  Qty: 90 tablet, Refills: 3    Associated Diagnoses: Chronic systolic heart failure ([facility])      metFORMIN (GLUCOPHAGE-XR) 500 mg 24 hr tablet TAKE ONE TABLET BY MOUTH DAILY  Qty: 90 tablet, Refills: 3    Associated Diagnoses: Type 2 diabetes mellitus with hyperglycemia, with long-term current use of insulin ([facility])      metoprolol succinate (TOPROL-XL) 200 MG 24 hr tablet TAKE ONE TABLET BY MOUTH EVERY DAY  Qty: 90 tablet, Refills: 3    Associated Diagnoses: Coronary artery disease involving native heart without angina pectoris, unspecified vessel or lesion type      NovoLOG FlexPen 100 units/mL injection pen INJECT 35 UNITS UNDER THE SKIN THREE TIMES A DAY WITH MEALS AND PER SLIDING SCALE  Qty: 30 mL, Refills: 3    Associated Diagnoses: Uncontrolled type 2 diabetes mellitus with hyperglycemia ([facility])      potassium chloride (K-DUR,KLOR-CON) 20 mEq tablet TAKE ONE TABLET BY MOUTH DAILY  Qty: 90 tablet, Refills: 0    Associated Diagnoses: Electrolyte abnormality      QUEtiapine (SEROquel XR) 50 mg Take 100 mg by mouth every morning      QUEtiapine (SEROquel) 300 mg tablet Take 300 mg by mouth daily at bedtime      sertraline (ZOLOFT) 50 mg tablet Take 50 mg by mouth daily Daily at bedtime      spironolactone (ALDACTONE) 25 mg tablet TAKE ONE TABLET BY MOUTH DAILY  Qty: 30 tablet, Refills: 2    Associated Diagnoses: Chronic systolic heart failure (HCC)      Vascepa 1 g CAPS TAKE 2 CAPSULES BY MOUTH TWICE DAILY  Qty: 360 capsule, Refills: 3    Associated Diagnoses: Dyslipidemia      Victoza injection INJECT 0 3 ML (1 8 MG TOTAL) UNDER THE SKIN DAILY  Qty: 9 mL, Refills: 1    Associated Diagnoses: Type 2 diabetes mellitus with complication, with long-term current use of insulin ([facility])      albuterol (2 5 mg/3 mL) 0 083 % nebulizer solution Take 1 vial (2 5 mg total) by nebulization every 6 (six) hours as needed for wheezing  Qty: 360 mL, Refills: 5    Associated Diagnoses: Chronic obstructive pulmonary disease, unspecified COPD type ([facility])      Alcohol Swabs (B-D SINGLE USE SWABS REGULAR) PADS USE FIVE TIMES A DAY AS DIRECTED  Qty: 500 each, Refills: 1    Associated Diagnoses: Type 2 diabetes mellitus with hyperglycemia, with long-term current use of insulin ([facility]      ! ! B-D ULTRAFINE III SHORT PEN 31G X 8 MM MISC Use as directed      Blood Glucose Monitoring Suppl (ONE TOUCH ULTRA 2) w/Device KIT Use daily USE AS DIRECTED  Qty: 1 kit, Refills: 0    Associated Diagnoses: Type 2 diabetes mellitus with complication, with long-term current use of insulin ([facility]) !! [place] Choice Comfort EZ 33G X 4 MM MISC USE TO INJECT INSULIN 5 TIMES A DAY      diclofenac sodium (Voltaren) 1 % Apply 2 g topically 2 (two) times a day as needed (For pain)  Qty: 100 g, Refills: 0    Associated Diagnoses: Muscle spasm      fluticasone-umeclidinium-vilanterol (TRELEGY) 100-62 5-25 MCG/INH inhaler Inhale 1 puff daily Rinse mouth after use  Qty: 1 each, Refills: 11    Associated Diagnoses: Centrilobular emphysema ([facility] )      glucose blood (OneTouch Verio) test strip test blood sugar 3 (three) times a day  Qty: 300 each, Refills: 3    Associated Diagnoses: Type 2 diabetes mellitus with complication, with long-term current use of insulin ([facility])      guaifenesin-codeine (GUAIFENESIN AC) 100-10 MG/5ML liquid Take 5 mL by mouth 3 (three) times a day as needed for cough  Qty: 120 mL, Refills: 0    Associated Diagnoses: Cough      !! Insulin Pen Needle ([place] Choice Comfort EZ) 33G X 4 MM MISC USE TO INJECT INSULIN 5 TIMES A DAY  Qty: 500 each, Refills: 1    Associated Diagnoses: Type 2 diabetes mellitus with complication, with long-term current use of insulin ([facility])      Lancets (onetouch ultrasoft) lancets test blood sugar 3 (three) times a day  Qty: 300 each, Refills: 3    Associated Diagnoses: Type 2 diabetes mellitus with complication, with long-term current use of insulin ([facility])      lidocaine (LMX) 4 % cream APPLY TOPICALLY AS NEEDED FOR MILD PAIN  Qty: 45 g, Refills: 0    Associated Diagnoses: Back pain with radiation      Multiple Vitamins-Minerals (CENTRUM SILVER 50+MEN PO) Take by mouth      omeprazole (PriLOSEC) 20 mg delayed release capsule Take 1 capsule (20 mg total) by mouth daily  Qty: 90 capsule, Refills: 3    Associated Diagnoses: Gastroesophageal reflux disease      torsemide 40 MG TABS Take 40 mg by mouth daily for 14 days  Qty: 14 tablet, Refills: 0    Associated Diagnoses: Chronic congestive heart failure, unspecified heart failure type ([facility] )       ! ! - Potential duplicate medications found  Please discuss with provider  No discharge procedures on file      PDMP Review       Value Time User    PDMP Reviewed  Yes 4/17/2022  5:37  [facility],           ED Provider  Electronically Signed by           Mark Spain PA-C  04/17/22 2037

## 2022-04-17 NOTE — PLAN OF CARE
Problem: MOBILITY - ADULT  Goal: Maintain or return to baseline ADL function  Description: INTERVENTIONS:  -  Assess patient's ability to carry out ADLs; assess patient's baseline for ADL function and identify physical deficits which impact ability to perform ADLs (bathing, care of mouth/teeth, toileting, grooming, dressing, etc )  - Assess/evaluate cause of self-care deficits   - Assess range of motion  - Assess patient's mobility; develop plan if impaired  - Assess patient's need for assistive devices and provide as appropriate  - Encourage maximum independence but intervene and supervise when necessary  - Involve family in performance of ADLs  - Assess for home care needs following discharge   - Consider OT consult to assist with ADL evaluation and planning for discharge  - Provide patient education as appropriate  Outcome: Progressing  Goal: Maintains/Returns to pre admission functional level  Description: INTERVENTIONS:  - Perform BMAT or MOVE assessment daily    - Set and communicate daily mobility goal to care team and patient/family/caregiver     - Collaborate with rehabilitation services on mobility goals if consulted  - Dangle patient   - Stand patient 3 times a day  - Ambulate patient 3 times a day  - Out of bed to chair 3 times a day   - Out of bed for meals   - Out of bed for toileting  - Record patient progress and toleration of activity level   Outcome: Progressing     Problem: Potential for Falls  Goal: Patient will remain free of falls  Description: INTERVENTIONS:  - Educate patient/family on patient safety including physical limitations  - Instruct patient to call for assistance with activity   - Consult OT/PT to assist with strengthening/mobility   - Keep Call bell within reach  - Keep bed low and locked with side rails adjusted as appropriate  - Keep care items and personal belongings within reach  - Initiate and maintain comfort rounds  - Make Fall Risk Sign visible to staff  - Offer Toileting every 2 Hours, in advance of need  - Initiate/Maintain bed alarm  - Obtain necessary fall risk management equipment  - Apply yellow socks and bracelet for high fall risk patients  - Consider moving patient to room near nurses station  Outcome: Progressing     Problem: PAIN - ADULT  Goal: Verbalizes/displays adequate comfort level or baseline comfort level  Description: Interventions:  - Encourage patient to monitor pain and request assistance  - Assess pain using appropriate pain scale  - Administer analgesics based on type and severity of pain and evaluate response  - Implement non-pharmacological measures as appropriate and evaluate response  - Consider cultural and social influences on pain and pain management  - Notify physician/advanced practitioner if interventions unsuccessful or patient reports new pain  Outcome: Progressing
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
## 2022-04-18 ENCOUNTER — PATIENT OUTREACH (OUTPATIENT)
Dept: CASE MANAGEMENT | Facility: OTHER | Age: 62
End: 2022-04-18

## 2022-04-18 LAB
BASOPHILS # BLD AUTO: 0.01 THOUSANDS/ΜL (ref 0–0.1)
BASOPHILS NFR BLD AUTO: 0 % (ref 0–1)
EOSINOPHIL # BLD AUTO: 0.06 THOUSAND/ΜL (ref 0–0.61)
EOSINOPHIL NFR BLD AUTO: 1 % (ref 0–6)
ERYTHROCYTE [DISTWIDTH] IN BLOOD BY AUTOMATED COUNT: 13.3 % (ref 11.6–15.1)
GLUCOSE SERPL-MCNC: 127 MG/DL (ref 65–140)
GLUCOSE SERPL-MCNC: 135 MG/DL (ref 65–140)
GLUCOSE SERPL-MCNC: 161 MG/DL (ref 65–140)
GLUCOSE SERPL-MCNC: 187 MG/DL (ref 65–140)
GLUCOSE SERPL-MCNC: 77 MG/DL (ref 65–140)
HCT VFR BLD AUTO: 35.4 % (ref 36.5–49.3)
HGB BLD-MCNC: 12.2 G/DL (ref 12–17)
IMM GRANULOCYTES # BLD AUTO: 0.04 THOUSAND/UL (ref 0–0.2)
IMM GRANULOCYTES NFR BLD AUTO: 0 % (ref 0–2)
LYMPHOCYTES # BLD AUTO: 4.36 THOUSANDS/ΜL (ref 0.6–4.47)
LYMPHOCYTES NFR BLD AUTO: 45 % (ref 14–44)
MCH RBC QN AUTO: 31.7 PG (ref 26.8–34.3)
MCHC RBC AUTO-ENTMCNC: 34.5 G/DL (ref 31.4–37.4)
MCV RBC AUTO: 92 FL (ref 82–98)
MONOCYTES # BLD AUTO: 0.66 THOUSAND/ΜL (ref 0.17–1.22)
MONOCYTES NFR BLD AUTO: 7 % (ref 4–12)
NEUTROPHILS # BLD AUTO: 4.63 THOUSANDS/ΜL (ref 1.85–7.62)
NEUTS SEG NFR BLD AUTO: 47 % (ref 43–75)
NRBC BLD AUTO-RTO: 0 /100 WBCS
PLATELET # BLD AUTO: 153 THOUSANDS/UL (ref 149–390)
PMV BLD AUTO: 9.1 FL (ref 8.9–12.7)
RBC # BLD AUTO: 3.85 MILLION/UL (ref 3.88–5.62)
WBC # BLD AUTO: 9.76 THOUSAND/UL (ref 4.31–10.16)

## 2022-04-18 PROCEDURE — 99232 SBSQ HOSP IP/OBS MODERATE 35: CPT | Performed by: STUDENT IN AN ORGANIZED HEALTH CARE EDUCATION/TRAINING PROGRAM

## 2022-04-18 PROCEDURE — 82948 REAGENT STRIP/BLOOD GLUCOSE: CPT

## 2022-04-18 PROCEDURE — 97167 OT EVAL HIGH COMPLEX 60 MIN: CPT

## 2022-04-18 PROCEDURE — 85025 COMPLETE CBC W/AUTO DIFF WBC: CPT | Performed by: STUDENT IN AN ORGANIZED HEALTH CARE EDUCATION/TRAINING PROGRAM

## 2022-04-18 PROCEDURE — 94640 AIRWAY INHALATION TREATMENT: CPT

## 2022-04-18 PROCEDURE — 94760 N-INVAS EAR/PLS OXIMETRY 1: CPT

## 2022-04-18 PROCEDURE — 94660 CPAP INITIATION&MGMT: CPT

## 2022-04-18 RX ORDER — KETOROLAC TROMETHAMINE 30 MG/ML
15 INJECTION, SOLUTION INTRAMUSCULAR; INTRAVENOUS EVERY 6 HOURS PRN
Status: DISPENSED | OUTPATIENT
Start: 2022-04-18 | End: 2022-04-19

## 2022-04-18 RX ADMIN — DICLOFENAC SODIUM TOPICAL GEL, 1%, 2 G: 10 GEL TOPICAL at 05:31

## 2022-04-18 RX ADMIN — GABAPENTIN 300 MG: 300 CAPSULE ORAL at 22:54

## 2022-04-18 RX ADMIN — BUSPIRONE HYDROCHLORIDE 10 MG: 10 TABLET ORAL at 09:16

## 2022-04-18 RX ADMIN — ASPIRIN 81 MG: 81 TABLET, COATED ORAL at 09:16

## 2022-04-18 RX ADMIN — INSULIN LISPRO 35 UNITS: 100 INJECTION, SOLUTION INTRAVENOUS; SUBCUTANEOUS at 17:14

## 2022-04-18 RX ADMIN — ACETAMINOPHEN 650 MG: 325 TABLET, FILM COATED ORAL at 17:14

## 2022-04-18 RX ADMIN — GABAPENTIN 300 MG: 300 CAPSULE ORAL at 15:32

## 2022-04-18 RX ADMIN — ALBUTEROL SULFATE 2.5 MG: 2.5 SOLUTION RESPIRATORY (INHALATION) at 10:17

## 2022-04-18 RX ADMIN — QUETIAPINE FUMARATE 300 MG: 100 TABLET ORAL at 22:53

## 2022-04-18 RX ADMIN — KETOROLAC TROMETHAMINE 15 MG: 30 INJECTION, SOLUTION INTRAMUSCULAR at 15:32

## 2022-04-18 RX ADMIN — PANTOPRAZOLE SODIUM 20 MG: 20 TABLET, DELAYED RELEASE ORAL at 05:31

## 2022-04-18 RX ADMIN — Medication 1000 UNITS: at 09:16

## 2022-04-18 RX ADMIN — BUSPIRONE HYDROCHLORIDE 10 MG: 10 TABLET ORAL at 17:14

## 2022-04-18 RX ADMIN — SPIRONOLACTONE 25 MG: 25 TABLET ORAL at 09:16

## 2022-04-18 RX ADMIN — INSULIN GLARGINE 75 UNITS: 100 INJECTION, SOLUTION SUBCUTANEOUS at 09:15

## 2022-04-18 RX ADMIN — DICLOFENAC SODIUM TOPICAL GEL, 1%, 2 G: 10 GEL TOPICAL at 13:30

## 2022-04-18 RX ADMIN — LOSARTAN POTASSIUM 50 MG: 50 TABLET, FILM COATED ORAL at 09:16

## 2022-04-18 RX ADMIN — GABAPENTIN 300 MG: 300 CAPSULE ORAL at 09:16

## 2022-04-18 RX ADMIN — SERTRALINE HYDROCHLORIDE 50 MG: 50 TABLET ORAL at 09:16

## 2022-04-18 RX ADMIN — ALBUTEROL SULFATE 2.5 MG: 2.5 SOLUTION RESPIRATORY (INHALATION) at 15:03

## 2022-04-18 RX ADMIN — ATORVASTATIN CALCIUM 80 MG: 80 TABLET, FILM COATED ORAL at 09:16

## 2022-04-18 RX ADMIN — KETOROLAC TROMETHAMINE 15 MG: 30 INJECTION, SOLUTION INTRAMUSCULAR at 22:52

## 2022-04-18 RX ADMIN — LIDOCAINE: 40 CREAM TOPICAL at 08:00

## 2022-04-18 RX ADMIN — ACETAMINOPHEN 650 MG: 325 TABLET, FILM COATED ORAL at 09:21

## 2022-04-18 RX ADMIN — APIXABAN 5 MG: 5 TABLET, FILM COATED ORAL at 09:16

## 2022-04-18 RX ADMIN — FLUTICASONE FUROATE AND VILANTEROL TRIFENATATE 1 PUFF: 100; 25 POWDER RESPIRATORY (INHALATION) at 09:21

## 2022-04-18 RX ADMIN — ALBUTEROL SULFATE 2.5 MG: 2.5 SOLUTION RESPIRATORY (INHALATION) at 22:56

## 2022-04-18 RX ADMIN — OMEGA-3 FATTY ACIDS CAP 1000 MG 2000 MG: 1000 CAP at 09:16

## 2022-04-18 RX ADMIN — INSULIN LISPRO 35 UNITS: 100 INJECTION, SOLUTION INTRAVENOUS; SUBCUTANEOUS at 11:58

## 2022-04-18 RX ADMIN — KETOROLAC TROMETHAMINE 15 MG: 30 INJECTION, SOLUTION INTRAMUSCULAR at 09:32

## 2022-04-18 RX ADMIN — METOPROLOL SUCCINATE 200 MG: 100 TABLET, EXTENDED RELEASE ORAL at 09:16

## 2022-04-18 RX ADMIN — INSULIN GLARGINE 75 UNITS: 100 INJECTION, SOLUTION SUBCUTANEOUS at 22:53

## 2022-04-18 RX ADMIN — QUETIAPINE FUMARATE 100 MG: 50 TABLET, EXTENDED RELEASE ORAL at 09:21

## 2022-04-18 RX ADMIN — POTASSIUM CHLORIDE 20 MEQ: 20 TABLET, EXTENDED RELEASE ORAL at 09:17

## 2022-04-18 RX ADMIN — TORSEMIDE 40 MG: 20 TABLET ORAL at 09:16

## 2022-04-18 RX ADMIN — APIXABAN 5 MG: 5 TABLET, FILM COATED ORAL at 17:14

## 2022-04-18 RX ADMIN — ALPRAZOLAM 2 MG: 0.5 TABLET ORAL at 22:53

## 2022-04-18 RX ADMIN — DIGOXIN 250 MCG: 125 TABLET ORAL at 09:16

## 2022-04-18 RX ADMIN — INSULIN LISPRO 35 UNITS: 100 INJECTION, SOLUTION INTRAVENOUS; SUBCUTANEOUS at 09:17

## 2022-04-18 RX ADMIN — DICLOFENAC SODIUM TOPICAL GEL, 1%, 2 G: 10 GEL TOPICAL at 19:46

## 2022-04-18 NOTE — PROGRESS NOTES
Chart review indicates current hospital admission at [facility] for back pain  Will plan to follow-up on management of COPD after discharge, as per referral from [id number] and OP KENDAL Garcia
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
## 2022-04-18 NOTE — ASSESSMENT & PLAN NOTE
Lab Results   Component Value Date    HGBA1C 8 6 (H) 03/25/2022       Recent Labs     04/17/22 2033 04/18/22  0753 04/18/22  1129   POCGLU 183* 127 161*       Blood Sugar Average: Last 72 hrs:    Continue Lantus and Humalog, Victoza  Diabetic diet  Gabapentin for neuropathy  (P) 157

## 2022-04-18 NOTE — PROGRESS NOTES
Phan [facility]  Progress Note Isa Kelly 1960, 57 y o  male MRN: [id number]  Unit/Bed#: [address] [place] [address] Encounter: [id number]  Primary Care Provider: Dutch Markham MD   Date and time admitted to hospital: 4/17/2022  2:20 PM    * Chronic pain syndrome  Assessment & Plan  Follows with pain management outpatient  PT/OT  Analgesics p r n  Leukocytosis  Assessment & Plan  Recently received steroid injection     Chronic reflux esophagitis  Assessment & Plan  Continue protonix    Benign essential hypertension  Assessment & Plan  Continue diuretics, metoprolol, losartan    Coronary artery disease involving native heart  Assessment & Plan  Continue metoprolol, losartan, aspirin, Lipitor     Morbid obesity ([facility] )  Assessment & Plan  Lifestyle modifications and outpatient followup     SHAVONNE and COPD overlap syndrome ([facility] )  Assessment & Plan  Continue BiPAP    Uncontrolled type 2 diabetes mellitus with hyperglycemia [facility])  Assessment & Plan  Lab Results   Component Value Date    HGBA1C 8 6 (H) 03/25/2022       Recent Labs     04/17/22  2033 04/18/22  0753 04/18/22  1129   POCGLU 183* 127 161*       Blood Sugar Average: Last 72 hrs:    Continue Lantus and Humalog, Victoza  Diabetic diet  Gabapentin for neuropathy  (P) 157    Bipolar affective disorder (HCC)  Assessment & Plan  Continue Zoloft, Seroquel, BuSpar, Xanax        VTE Pharmacologic Prophylaxis:  Eliquis     Patient Centered Rounds: I performed bedside rounds with nursing staff today  Discussions with Specialists or Other Care Team Provider: Yes    Education and Discussions with Family / Patient: Yes    Time Spent for Care: 45 minutes  More than 50% of total time spent on counseling and coordination of care as described above      Current Length of Stay: 1 day(s)  Current Patient Status: Inpatient   Certification Statement: The patient will continue to require additional inpatient hospital stay due to radiculopathy, ambulatory dysfunction   Discharge Plan: Anticipate discharge tomorrow to discharge location to be determined pending rehab evaluations  Code Status: Level 1 - Full Code    Subjective:   No acute events overnight     Objective:     Vitals:   Temp (24hrs), Av 2 °F (36 8 °C), Min:97 6 °F (36 4 °C), Max:98 9 °F (37 2 °C)    Temp:  [97 6 °F (36 4 °C)-98 9 °F (37 2 °C)] 98 °F (36 7 °C)  HR:  [60-72] 70  Resp:  [18-20] 18  BP: (115-161)/(58-88) 148/76  SpO2:  [94 %-99 %] 94 %  Body mass index is 38 17 kg/m²  Input and Output Summary (last 24 hours): Intake/Output Summary (Last 24 hours) at 2022 1531  Last data filed at 2022 0800  Gross per 24 hour   Intake 480 ml   Output 500 ml   Net -20 ml       Physical Exam:   Physical Exam  HENT:      Head: Normocephalic and atraumatic  Mouth/Throat:      Mouth: Mucous membranes are moist    Eyes:      Extraocular Movements: Extraocular movements intact  Cardiovascular:      Rate and Rhythm: Normal rate  Pulmonary:      Effort: Pulmonary effort is normal    Abdominal:      General: Abdomen is flat  Palpations: Abdomen is soft  Tenderness: There is no abdominal tenderness  Skin:     General: Skin is warm and dry  Neurological:      Mental Status: He is alert            Additional Data:     Labs:  Results from last 7 days   Lab Units 22  0532   WBC Thousand/uL 9 76   HEMOGLOBIN g/dL 12 2   HEMATOCRIT % 35 4*   PLATELETS Thousands/uL 153   NEUTROS PCT % 47   LYMPHS PCT % 45*   MONOS PCT % 7   EOS PCT % 1     Results from last 7 days   Lab Units 22  1453   SODIUM mmol/L 128*   POTASSIUM mmol/L 4 8   CHLORIDE mmol/L 94*   CO2 mmol/L 24   BUN mg/dL 11   CREATININE mg/dL 0 99   ANION GAP mmol/L 10   CALCIUM mg/dL 8 9   ALBUMIN g/dL 4 0   TOTAL BILIRUBIN mg/dL 0 66   ALK PHOS U/L 48   ALT U/L 40   AST U/L 19   GLUCOSE RANDOM mg/dL 190*         Results from last 7 days   Lab Units 22  1129 22  0753 223 04/15/22  1102   POC GLUCOSE mg/dl 161* 127 183* 151*               Lines/Drains:  Invasive Devices  Report    Peripheral Intravenous Line            Peripheral IV 04/17/22 Left Antecubital 1 day                      Imaging: Reviewed radiology reports from this admission including: abdominal/pelvic CT    Recent Cultures (last 7 days):         Last 24 Hours Medication List:   Current Facility-Administered Medications   Medication Dose Route Frequency Provider Last Rate    acetaminophen  650 mg Oral Q6H PRN Agape L Lila, DO      albuterol  2 5 mg Nebulization Q6H PRN Agape L Lila, DO      ALPRAZolam  2 mg Oral HS Agape L Lila, DO      apixaban  5 mg Oral BID Agape L Lila, DO      aspirin  81 mg Oral Daily Agape L Lila, DO      atorvastatin  80 mg Oral Daily Agape L Lila, DO      busPIRone  10 mg Oral BID Agape Edward Bullocks, DO      cholecalciferol  1,000 Units Oral Daily Agape L Lila, DO      dextromethorphan-guaiFENesin  5 mL Oral Q8H PRN Agape Edward Bullocks, DO      Diclofenac Sodium  2 g Topical 4x Daily PRN Agape Edward Bullocks, DO      digoxin  250 mcg Oral Daily Agape L Lila, DO      fish oil  2,000 mg Oral Daily Agape L Lila, DO      fluticasone-vilanterol  1 puff Inhalation Daily Jef L Lila, DO      gabapentin  300 mg Oral TID Agape L Lila, DO      insulin glargine  75 Units Subcutaneous Q12H [address] Agape L Lila, DO      insulin lispro  35 Units Subcutaneous TID With Meals [place] Edward Hutton, DO      ketorolac  15 mg Intravenous Q6H PRN Agape Edward Hutton, DO      lidocaine   Topical Daily PRN Jef L Lila, DO      liraglutide  1 8 mg Subcutaneous Daily [place] L Lila, DO      losartan  50 mg Oral Daily Agape L Lila, DO      metoprolol succinate  200 mg Oral Daily Agape L Lila, DO      pantoprazole  20 mg Oral Early Morning Jef L Lila, DO      potassium chloride  20 mEq Oral Daily Agape L Lila, DO      QUEtiapine  100 mg Oral QAM [place] L Lila, DO      QUEtiapine  300 mg Oral HS Agape L Lila, DO      sertraline  50 mg Oral Daily Agape L DO Lila      spironolactone  25 mg Oral Daily Jef Sharp DO      torsemide  40 mg Oral Daily Jef Hills DO          Today, Patient Was Seen By: Arnav Xiao DO    **Please Note: This note may have been constructed using a voice recognition system  **

## 2022-04-18 NOTE — UTILIZATION REVIEW
Inpatient Admission Authorization Request   NOTIFICATION OF INPATIENT ADMISSION/INPATIENT AUTHORIZATION REQUEST   SERVICING FACILITY:   [place] [address]  Tax ID: [id number]  NPI: [id number]  Place of Service: Inpatient [address] [facility]  [address]  Place of Service Code: 24     ATTENDING PROVIDER:  Attending Name and NPI#: Lucio Waldrop [[id number]]  Address: [address] [place] [address]  Phone: [phone]     UTILIZATION REVIEW CONTACT:  Crystal Montalvo, Utilization   Network Utilization Review Department  Phone: [phone]  Fax [phone]  Email: [email or web address]     PHYSICIAN ADVISORY SERVICES:  FOR AAMT-ZL-FISN REVIEW - MEDICAL NECESSITY DENIAL  Phone: [phone]  Fax: [phone]  Email: [email or web address]     TYPE OF REQUEST:  Inpatient Status     ADMISSION INFORMATION:  ADMISSION DATE/TIME: 4/17/22  5:16 PM  PATIENT DIAGNOSIS CODE/DESCRIPTION:  Back pain [M54 9]  DISCHARGE DATE/TIME: No discharge date for patient encounter  IMPORTANT INFORMATION:  Please contact the [facility]te [facility] directly with any questions or concerns regarding this request  Department voicemails are confidential     Send requests for admission clinical reviews, concurrent reviews, approvals, and administrative denials due to lack of clinical to fax [phone]

## 2022-04-18 NOTE — CASE MANAGEMENT
Case Management Assessment & Discharge Planning Note    Patient name Sylvia Shaikh  Location [address]/3 [address] 18-* MRN [id number]  : 1960 Date 2022       Current Admission Date: 2022  Current Admission Diagnosis:Chronic pain syndrome   Patient Active Problem List    Diagnosis Date Noted    Chronic pain syndrome 2022    Foraminal stenosis of lumbar region 2022    Lumbar post-laminectomy syndrome 2022    Lumbar radiculopathy 2022    Shortness of breath 2022    SIRS (systemic inflammatory response syndrome) ([facility] ) 2022    Dysuria 2021    Peripheral neuropathy 2021    Chronic left-sided low back pain with left-sided sciatica 2021    BMI 40 0-44 9, adult ([facility] ) 2021    Muscle weakness (generalized) 2021    Platelets decreased ([facility] ) 2021    Medicare annual wellness visit, subsequent 2021    Chronic congestive heart failure ([facility] ) 2020    Leukocytosis 10/29/2020    Hyperlipidemia 10/29/2020    Nutritional anemia, unspecified  10/29/2020    Chest pain 10/11/2020    Simple chronic bronchitis ([facility] ) 10/11/2020    PAF (paroxysmal atrial fibrillation) ([facility] ) 2020    Hyperglycemia 2020    Transition of care performed with sharing of clinical summary 2020    Obstructive sleep apnea 2020    Obesity (BMI 30-39 9) 2020    Stage 2 chronic kidney disease 2020    Hyponatremia 2020    COPD (chronic obstructive pulmonary disease) ([facility] ) 2020    Chronic a-fib ([facility] ) 2020    H/O vitamin D deficiency 10/28/2019    Dyslipidemia 2019    Type 2 diabetes mellitus with complication, with long-term current use of insulin ([facility] ) 2019    Restrictive ventilatory defect 2019    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 [facility]) 2019    Lung disease, restrictive 2018    Chronic respiratory failure with hypoxia ([place] [address] ) 10/31/2018    Coronary artery disease involving native heart 09/06/2017    Esophageal reflux 09/06/2017    Back ache 11/30/2016    SHAVONNE and COPD overlap syndrome ([place] [address] )     Morbid obesity ([place] [address] )     Uncontrolled type 2 diabetes mellitus with hyperglycemia ([place] [address] ) 09/02/2016    Fatigue 09/02/2016    Chronic reflux esophagitis 06/08/2016    Cough 01/13/2016    COPD with exacerbation ([place] [address] ) 01/13/2016    Bipolar affective disorder ([place] [address] )     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/25/2015    Erectile dysfunction of organic origin 08/25/2014    Benign essential hypertension 09/04/2012    Diabetic neuropathy ([place] [address] ) 09/04/2012    Panic disorder without agoraphobia 09/04/2012    Vitamin D deficiency 09/04/2012      LOS (days): 1  Geometric Mean LOS (GMLOS) (days):   Days to GMLOS:     OBJECTIVE:  PATIENT READMITTED [facility]  of Unplanned Readmission Score: 28   Current admission status: Inpatient    Preferred Pharmacy:   [address] [place], [place] [address]  Phone: [phone] Fax: [phone]    Primary Care Provider: Hansa Ward MD  Primary Insurance: MEDICARE MISC REPLACEMENT  Secondary Insurance: Harshayna Lopez MA [facility]    ASSESSMENT:  Jess Burns Proxies     [place], [address] Representative - Friend   Primary Phone: [phone] (Mobile)          Advance Directives  Does patient have a [address] [place] [address] Avenue?: No  Was patient offered paperwork?: Yes  Does patient currently have a Health Care decision maker?: Yes, please see Health Care Proxy section  Does patient have Advance Directives?: No  Was patient offered paperwork?: Yes  Primary Contact: Michelle Latham (friend) [phone]    Readmission Root Cause  30 Day Readmission: Yes  Who directed you to return to the hospital?: Self  Did you understand whom to contact if you had questions or problems?: Yes  Did you get your prescriptions before you left the hospital?: No  Reason[de-identified] Not preferred pharmacy  Were you able to get your prescriptions filled when you left the hospital?: Yes  Did you take your medications as prescribed?: Yes  Were you able to get to your follow-up appointments?: No (He states he was in too much pain to go to his follow up appoitment )  Reason[de-identified] Readmitted prior to appointment  During previous admission, was a post-acute recommendation made?: Yes  What post-acute resources were offered?: STR (Insurance denied STR placement even after p2p  Unable to get [address] as well due to insurance barrier )  Patient was readmitted due to: chronic pain, s/p fall  Action Plan: STR referral made  Last admission a referral was placed to STR and was denied by insurance company  There was also an issue with getting HHC at [facility] as well  Patient Information  Admitted from[de-identified] Home  Mental Status: Alert  During Assessment patient was accompanied by: Not accompanied during assessment  Assessment information provided by[de-identified] Patient  Primary Caregiver: Self  Support Systems: Self,Friend,Family members  [place] of Residence: [address] [place] [address] do you live in?: [address] [facility] entry access options   Select all that apply : No steps to enter home  Type of Current Residence: Apartment  Floor Level: 2  Upon entering residence, is there a bedroom on the main floor (no further steps)?: Yes  Upon entering residence, is there a bathroom on the main floor (no further steps)?: Yes  In the last 12 months, was there a time when you were not able to pay the mortgage or rent on time?: No  In the last 12 months, how many places have you lived?: 1  In the last 12 months, was there a time when you did not have a steady place to sleep or slept in a shelter (including now)?: No  Homeless/housing insecurity resource given?: N/A  Living Arrangements: Lives Alone  Is patient a ?: No    Activities of Daily Living Prior to Admission  Functional Status: Independent  Completes ADLs independently?: Yes  Ambulates independently?: Yes  Does patient use assisted devices?: Yes  Assisted Devices (DME) used: Walker,Wheelchair,Other (Comment) (motorized wc)  Does patient currently own DME?: Yes  What DME does the patient currently own?: Walker,Wheelchair,Portable Oxygen tanks,Electric Wheelchair,Home Oxygen concentrator,Hospital Bed,Nebulizer,CPAP,Straight Cane (motorized wc)  Does patient have a history of Outpatient Therapy (PT/OT)?: Yes  Does the patient have a history of Short-Term Rehab?: Yes ([facility])  Does patient have a history of HHC?: Yes ([facility])  Does patient currently have [facility]?: No    Patient Information Continued  Income Source: SSI/SSD  Does patient have prescription coverage?: Yes  Within the past 12 months, you worried that your food would run out before you got the money to buy more : Never true  Within the past 12 months, the food you bought just didnt last and you didnt have money to get more : Never true  Food insecurity resource given?: N/A  Does patient receive dialysis treatments?: No  Does patient have a history of substance abuse?: No  Does patient have a history of Mental Health Diagnosis?: Yes  Is patient receiving treatment for mental health?: Yes  Has patient received inpatient treatment related to mental health in the last 2 years?: No    Means of Transportation  Means [facility] Transport to [facility][de-identified] [address] [place] [address]  In the past 12 months, has lack of transportation kept you from medical appointments or from getting medications?: No  In the past 12 months, has lack of transportation kept you from meetings, work, or from getting things needed for daily living?: No  Was application for public transport provided?: N/A    DISCHARGE DETAILS:  Discharge planning discussed with[de-identified] Patient  Freedom of Choice: Yes  Comments - Freedom of Choice: STR-1st preference is [facility]; agreeable to [facility] and [facility] if [facility] not have a bed    CM contacted family/caregiver?: No- see comments (Patient states he will call his friend Roel and keep her informed )  Were Treatment Team discharge recommendations reviewed with patient/caregiver?: Yes  Did patient/caregiver verbalize understanding of patient care needs?: Yes  Were patient/caregiver advised of the risks associated with not following Treatment Team discharge recommendations?: Yes    Contacts  Patient Contacts: Marlo Boeck  Relationship to Patient[de-identified] Friend  Contact Method: Phone  Phone Number: [phone]  Reason/Outcome: Emergency [address]         Is the patient interested in [address] at discharge?: No    DME Referral Provided  Referral made for DME?: No    Other Referral/Resources/Interventions Provided:  Interventions: Short Term Rehab    Would you like to participate in our [address] service program?  : No - Declined    Treatment Team Recommendation: Short Term Rehab  Discharge Destination Plan[de-identified] Short Term Rehab  Transport at Discharge : Other (Comment) (TBD)     CM met with patient and discussed dc planning, available services and the role of CM  Patient states he would like STR at dc, he cannot return home in the state he is in  He requests to go to STR at dc  Patient was provided with choice and his preference is [facility] [address] and [facility] would be his next choices  Patient is a 30 day RA and CM attempted to have him placed at [facility] then and insurance denied STR even after P2P  Patient was also denied by [facility]  Per [facility] patient insurance does not have benefits for [facility]  This was discussed with patient and he states understanding  He is aware CM will again attempt to obtain auth for STR  Patient states he was unable to go to his f/u PCP appointment because he was in too much pain  In basket message was sent to  [facility] regarding readmission and make her aware CM will attempt to get STR authorization again      CM to follow

## 2022-04-18 NOTE — OCCUPATIONAL THERAPY NOTE
OT EVALUATION       04/18/22 1158   Note Type   Note type Evaluation   Restrictions/Precautions   Other Precautions Chair Alarm; Bed Alarm; Fall Risk;Pain   Pain Assessment   Pain Assessment Tool 0-10   Pain Score 10 - Worst Possible Pain   Pain Location/Orientation Location: Back;Orientation: Left; Location: Leg   Home Living   Type of [address] One level;Elevator   Bathroom Shower/Tub Walk-in shower   [address] [place] [address] chair   Home Equipment Walker; Wheelchair-electric  (rollator)   Additional Comments Patient admiited with back pain and 2 falls  Patient reports limited ability to ambulate and complete ADLS  Prior Function   Level of [place] Independent with ADLs and functional mobility; Needs assistance with IADLs   Lives With Alone   Receives Help From Home health  (pt reports home health stopped coming)   ADL Assistance Independent   IADLs Needs assistance   Comments pt has groceries delivered   ADL   Eating Assistance 7  Independent   Grooming Assistance 7  [facility] 5  Supervision/Setup   LB [address] 3  Moderate Assistance   UB Dressing Assistance 5  Supervision/Setup   LB Dressing Assistance 3  Moderate [address]  4  Minimal Assistance   Bed Mobility   Supine to Sit 5  Supervision   Sit to Supine 5  Supervision   Transfers   Sit to Stand 4  Minimal assistance   Stand to Sit 4  Minimal assistance   Functional Mobility   Functional Mobility 4  Minimal assistance   Additional Comments 8 feet, limited by pain,  needed to go back to supine    Additional items SPC   Balance   Static Sitting Fair +   Dynamic Sitting Fair   Static Standing Fair   Dynamic Standing Fair -   Activity Tolerance   Activity Tolerance Patient limited by fatigue;Patient limited by pain   RUE Overall AROM   R Shoulder Flexion 20 degrees of flexion, reports back pain increases with ROM   R Elbow Flexion WFL   R Mass Grasp WFL   LUE Overall AROM   L Shoulder Flexion 20 degrees of flexion, reports back pain increases with ROM   L Wrist Flexion WFL   L Mass Grasp WFL   Cognition   Overall Cognitive Status WFL   Arousal/Participation Cooperative   Attention Within functional limits   Orientation Level Oriented X4   Following Commands Follows multistep commands with increased time or repetition   Assessment   Limitation Decreased ADL status; Decreased Safe judgement during ADL;Decreased endurance;Decreased UE ROM; Decreased UE strength;Decreased high-level ADLs; Decreased self-care trans  (decreased balance and mobility )   Prognosis Good   Assessment Patient evaluated by Occupational Therapy  Patient admitted with Chronic pain syndrome  The patients occupational profile, medical and therapy history includes a extensive additional review of physical, cognitive, or psychosocial history related to current functional performance  Comorbidities affecting functional mobility and ADLS include: Afib, Bipolar disorder, CHF, COPD, diabetes and hypertension  Prior to admission, patient was independent with functional mobility with cane, independent with ADLS and requiring assist for IADLS  The evaluation identifies the following performance deficits: weakness, decreased ROM, impaired balance, decreased endurance, increased fall risk, new onset of impairment of functional mobility, decreased ADLS, decreased IADLS, pain, decreased activity tolerance, decreased safety awareness, impaired judgement and decreased strength, that result in activity limitations and/or participation restrictions  This evaluation requires clinical decision making of high complexity, because the patient presents with comorbidites that affect occupational performance and required significant modification of tasks or assistance with consideration of multiple treatment options    The Barthel Index was used as a functional outcome tool presenting with a score of Barthel Index Score: 50, indicating marked limitations of functional mobility and ADLS  The patient's raw score on the AM-PAC Daily Activity inpatient short form is 17, standardized score is 37 26, less than 39 4  Patients at this level are likely to benefit from DC to post-acute rehabilitation services  Please refer to the recommendation of the Occupational Therapist for safe DC planning  Patient will benefit from skilled Occupational Therapy services to address above deficits and facilitate a safe return to prior level of function  Goals   Patient Goals less pain   STG Time Frame   (1-7 days)   Short Term Goal  Goals established to promote Patient Goals: less pain:  Patient will increase standing tolerance to 3 minutes during ADL task to decrease assistance level and decrease fall risk; Patient will increase bed mobility to independent in preparation for ADLS and transfers; Patient will increase functional mobility to and from bathroom with rolling walker with supervision to increase performance with ADLS and to use a toilet; Patient will tolerate 10 minutes of UE ROM/strengthening to increase general activity tolerance and performance in ADLS/IADLS; Patient will improve functional activity tolerance to 10 minutes of sustained functional tasks to increase participation in basic self-care and decrease assistance level;  Patient will be able to to verbalize understanding and perform energy conservation/proper body mechanics during ADLS and functional mobility at least 75% of the time with minimal cueing to decrease signs of fatigue and increase stamina to return to prior level of function; Patient will increase dynamic sitting balance to fair+ to improve the ability to sit at edge of bed or on a chair for ADLS;  Patient will increase dynamic standing balance to fair to improve postural stability and decrease fall risk during standing ADLS and transfers       LTG Time Frame   (8-14 days)   Long Term Goal Patient will increase standing tolerance to 6 minutes during ADL task to decrease assistance level and decrease fall risk;  Patient will increase functional mobility to and from bathroom with rolling walker independently to increase performance with ADLS and to use a toilet; Patient will tolerate 20 minutes of UE ROM/strengthening to increase general activity tolerance and performance in ADLS/IADLS; Patient will improve functional activity tolerance to 20 minutes of sustained functional tasks to increase participation in basic self-care and decrease assistance level;  Patient will be able to to verbalize understanding and perform energy conservation/proper body mechanics during ADLS and functional mobility at least 90% of the time to decrease signs of fatigue and increase stamina to return to prior level of function; Patient will increase dynamic sitting balance to good to improve the ability to sit at edge of bed or on a chair for ADLS;  Patient will increase dynamic standing balance to fair+ to improve postural stability and decrease fall risk during standing ADLS and transfers  Pt will score >/= 21/24 on AM-PAC Daily Activity Inpatient scale to promote safe independence with ADLs and functional mobility; Pt will score >/= 80/100 on Barthel Index in order to decrease caregiver assistance needed and increase ability to perform ADLs and functional mobility  Functional Transfer Goals   Pt Will Perform All Functional Transfers   (STG supervision LTG Independent)   ADL Goals   Pt Will Perform Grooming Standing at sink  (STG supervision LTG Independent )   Pt Will Perform Bathing   (STG min assist LTG supervision )   Pt Will Perform UE Dressing   (STG supervision LTG Independent)   Pt Will Perform LE Dressing   (STG min assist LTG supervision )   Pt Will Perform Toileting   (STG min assist LTG supervision )   Plan   Treatment Interventions ADL retraining;Functional transfer training;UE strengthening/ROM; Endurance training;Patient/family training;Equipment evaluation/education; Activityengagement; Compensatory technique education   Goal Expiration Date 05/02/22   OT Frequency 3-5x/wk   Recommendation   OT Discharge Recommendation Post acute rehabilitation services   AM-PAC Daily Activity Inpatient   Lower Body Dressing 2   Bathing 2   Toileting 3   Upper Body Dressing 3   Grooming 3   Eating 4   Daily Activity Raw Score 17   Daily Activity Standardized Score (Calc for Raw Score >=11) 37 26   AM-PAC Applied Cognition Inpatient   Following a Speech/Presentation 4   Understanding Ordinary Conversation 4   Taking Medications 4   Remembering Where Things Are Placed or Put Away 4   Remembering List of 4-5 Errands 4   Taking Care of Complicated Tasks 4   Applied Cognition Raw Score 24   Applied Cognition Standardized Score 62 21   Barthel Index   Feeding 10   Bathing 0   Grooming Score 0   Dressing Score 5   Bladder Score 10   Bowels Score 10   Toilet Use Score 5   Transfers (Bed/Chair) Score 10   Mobility (Level Surface) Score 0   Stairs Score 0   Barthel Index Score 50   Licensure   [place] License Number  Wang Mand[address]rum[address] OTR/L [id number]

## 2022-04-18 NOTE — PLAN OF CARE
Problem: MOBILITY - ADULT  Goal: Maintain or return to baseline ADL function  Description: INTERVENTIONS:  -  Assess patient's ability to carry out ADLs; assess patient's baseline for ADL function and identify physical deficits which impact ability to perform ADLs (bathing, care of mouth/teeth, toileting, grooming, dressing, etc )  - Assess/evaluate cause of self-care deficits   - Assess range of motion  - Assess patient's mobility; develop plan if impaired  - Assess patient's need for assistive devices and provide as appropriate  - Encourage maximum independence but intervene and supervise when necessary  - Involve family in performance of ADLs  - Assess for home care needs following discharge   - Consider OT consult to assist with ADL evaluation and planning for discharge  - Provide patient education as appropriate  Outcome: Progressing  Goal: Maintains/Returns to pre admission functional level  Description: INTERVENTIONS:  - Perform BMAT or MOVE assessment daily    - Set and communicate daily mobility goal to care team and patient/family/caregiver  - Collaborate with rehabilitation services on mobility goals if consulted  - Perform Range of Motion 4 times a day    - Out of bed for toileting  - Record patient progress and toleration of activity level   Outcome: Progressing

## 2022-04-19 ENCOUNTER — PATIENT OUTREACH (OUTPATIENT)
Dept: FAMILY MEDICINE CLINIC | Facility: CLINIC | Age: 62
End: 2022-04-19

## 2022-04-19 PROBLEM — I50.32 CHRONIC DIASTOLIC (CONGESTIVE) HEART FAILURE (HCC): Status: ACTIVE | Noted: 2022-04-19

## 2022-04-19 PROBLEM — I48.91 ATRIAL FIBRILLATION (HCC): Status: ACTIVE | Noted: 2020-05-24

## 2022-04-19 LAB
GLUCOSE SERPL-MCNC: 127 MG/DL (ref 65–140)
GLUCOSE SERPL-MCNC: 132 MG/DL (ref 65–140)
GLUCOSE SERPL-MCNC: 237 MG/DL (ref 65–140)
GLUCOSE SERPL-MCNC: 95 MG/DL (ref 65–140)

## 2022-04-19 PROCEDURE — 97163 PT EVAL HIGH COMPLEX 45 MIN: CPT

## 2022-04-19 PROCEDURE — 94760 N-INVAS EAR/PLS OXIMETRY 1: CPT

## 2022-04-19 PROCEDURE — 82948 REAGENT STRIP/BLOOD GLUCOSE: CPT

## 2022-04-19 PROCEDURE — 94640 AIRWAY INHALATION TREATMENT: CPT

## 2022-04-19 PROCEDURE — 99233 SBSQ HOSP IP/OBS HIGH 50: CPT | Performed by: INTERNAL MEDICINE

## 2022-04-19 PROCEDURE — 97110 THERAPEUTIC EXERCISES: CPT

## 2022-04-19 PROCEDURE — 94660 CPAP INITIATION&MGMT: CPT

## 2022-04-19 RX ORDER — LIDOCAINE 40 MG/G
CREAM TOPICAL EVERY 6 HOURS
Status: DISCONTINUED | OUTPATIENT
Start: 2022-04-19 | End: 2022-04-21 | Stop reason: HOSPADM

## 2022-04-19 RX ORDER — ALBUTEROL SULFATE 2.5 MG/3ML
2.5 SOLUTION RESPIRATORY (INHALATION)
Status: DISCONTINUED | OUTPATIENT
Start: 2022-04-19 | End: 2022-04-21

## 2022-04-19 RX ORDER — KETOROLAC TROMETHAMINE 30 MG/ML
30 INJECTION, SOLUTION INTRAMUSCULAR; INTRAVENOUS EVERY 6 HOURS PRN
Status: DISCONTINUED | OUTPATIENT
Start: 2022-04-19 | End: 2022-04-21 | Stop reason: HOSPADM

## 2022-04-19 RX ORDER — KETOROLAC TROMETHAMINE 30 MG/ML
15 INJECTION, SOLUTION INTRAMUSCULAR; INTRAVENOUS EVERY 6 HOURS PRN
Status: DISCONTINUED | OUTPATIENT
Start: 2022-04-19 | End: 2022-04-21 | Stop reason: HOSPADM

## 2022-04-19 RX ADMIN — ATORVASTATIN CALCIUM 80 MG: 80 TABLET, FILM COATED ORAL at 08:34

## 2022-04-19 RX ADMIN — PANTOPRAZOLE SODIUM 20 MG: 20 TABLET, DELAYED RELEASE ORAL at 07:12

## 2022-04-19 RX ADMIN — FLUTICASONE FUROATE AND VILANTEROL TRIFENATATE 1 PUFF: 100; 25 POWDER RESPIRATORY (INHALATION) at 08:36

## 2022-04-19 RX ADMIN — BUSPIRONE HYDROCHLORIDE 10 MG: 10 TABLET ORAL at 17:19

## 2022-04-19 RX ADMIN — LIDOCAINE: 40 CREAM TOPICAL at 12:30

## 2022-04-19 RX ADMIN — KETOROLAC TROMETHAMINE 30 MG: 30 INJECTION, SOLUTION INTRAMUSCULAR at 21:02

## 2022-04-19 RX ADMIN — DICLOFENAC SODIUM TOPICAL GEL, 1%, 2 G: 10 GEL TOPICAL at 08:37

## 2022-04-19 RX ADMIN — INSULIN GLARGINE 75 UNITS: 100 INJECTION, SOLUTION SUBCUTANEOUS at 08:33

## 2022-04-19 RX ADMIN — DIGOXIN 250 MCG: 125 TABLET ORAL at 08:33

## 2022-04-19 RX ADMIN — BUSPIRONE HYDROCHLORIDE 10 MG: 10 TABLET ORAL at 08:34

## 2022-04-19 RX ADMIN — INSULIN LISPRO 4 UNITS: 100 INJECTION, SOLUTION INTRAVENOUS; SUBCUTANEOUS at 21:55

## 2022-04-19 RX ADMIN — ALBUTEROL SULFATE 2.5 MG: 2.5 SOLUTION RESPIRATORY (INHALATION) at 05:17

## 2022-04-19 RX ADMIN — LOSARTAN POTASSIUM 50 MG: 50 TABLET, FILM COATED ORAL at 08:34

## 2022-04-19 RX ADMIN — QUETIAPINE FUMARATE 300 MG: 100 TABLET ORAL at 21:07

## 2022-04-19 RX ADMIN — OMEGA-3 FATTY ACIDS CAP 1000 MG 2000 MG: 1000 CAP at 08:34

## 2022-04-19 RX ADMIN — INSULIN LISPRO 35 UNITS: 100 INJECTION, SOLUTION INTRAVENOUS; SUBCUTANEOUS at 12:29

## 2022-04-19 RX ADMIN — ALPRAZOLAM 2 MG: 0.5 TABLET ORAL at 21:08

## 2022-04-19 RX ADMIN — QUETIAPINE FUMARATE 100 MG: 50 TABLET, EXTENDED RELEASE ORAL at 08:36

## 2022-04-19 RX ADMIN — LIDOCAINE: 40 CREAM TOPICAL at 22:46

## 2022-04-19 RX ADMIN — INSULIN LISPRO 35 UNITS: 100 INJECTION, SOLUTION INTRAVENOUS; SUBCUTANEOUS at 08:33

## 2022-04-19 RX ADMIN — ALBUTEROL SULFATE 2.5 MG: 2.5 SOLUTION RESPIRATORY (INHALATION) at 20:12

## 2022-04-19 RX ADMIN — SPIRONOLACTONE 25 MG: 25 TABLET ORAL at 08:34

## 2022-04-19 RX ADMIN — GABAPENTIN 300 MG: 300 CAPSULE ORAL at 08:34

## 2022-04-19 RX ADMIN — ALBUTEROL SULFATE 2.5 MG: 2.5 SOLUTION RESPIRATORY (INHALATION) at 11:05

## 2022-04-19 RX ADMIN — ACETAMINOPHEN 650 MG: 325 TABLET, FILM COATED ORAL at 12:30

## 2022-04-19 RX ADMIN — Medication 1000 UNITS: at 08:34

## 2022-04-19 RX ADMIN — SERTRALINE HYDROCHLORIDE 50 MG: 50 TABLET ORAL at 08:34

## 2022-04-19 RX ADMIN — LIDOCAINE: 40 CREAM TOPICAL at 18:00

## 2022-04-19 RX ADMIN — APIXABAN 5 MG: 5 TABLET, FILM COATED ORAL at 17:19

## 2022-04-19 RX ADMIN — INSULIN GLARGINE 75 UNITS: 100 INJECTION, SOLUTION SUBCUTANEOUS at 21:55

## 2022-04-19 RX ADMIN — KETOROLAC TROMETHAMINE 30 MG: 30 INJECTION, SOLUTION INTRAMUSCULAR at 14:08

## 2022-04-19 RX ADMIN — METOPROLOL SUCCINATE 200 MG: 100 TABLET, EXTENDED RELEASE ORAL at 08:34

## 2022-04-19 RX ADMIN — POTASSIUM CHLORIDE 20 MEQ: 20 TABLET, EXTENDED RELEASE ORAL at 08:34

## 2022-04-19 RX ADMIN — GABAPENTIN 300 MG: 300 CAPSULE ORAL at 17:19

## 2022-04-19 RX ADMIN — DICLOFENAC SODIUM TOPICAL GEL, 1%, 2 G: 10 GEL TOPICAL at 22:35

## 2022-04-19 RX ADMIN — GABAPENTIN 300 MG: 300 CAPSULE ORAL at 21:03

## 2022-04-19 RX ADMIN — ASPIRIN 81 MG: 81 TABLET, COATED ORAL at 08:34

## 2022-04-19 RX ADMIN — TORSEMIDE 40 MG: 20 TABLET ORAL at 08:34

## 2022-04-19 RX ADMIN — KETOROLAC TROMETHAMINE 15 MG: 30 INJECTION, SOLUTION INTRAMUSCULAR at 07:18

## 2022-04-19 RX ADMIN — INSULIN LISPRO 35 UNITS: 100 INJECTION, SOLUTION INTRAVENOUS; SUBCUTANEOUS at 17:20

## 2022-04-19 RX ADMIN — APIXABAN 5 MG: 5 TABLET, FILM COATED ORAL at 08:34

## 2022-04-19 NOTE — RESPIRATORY THERAPY NOTE
RN called to say the patient wanted a breathing tx and to go on his CPAP  I walked into his room and he said "Where the hell have you been? Karen been calling since 8 "  I told him that no one has contacted me until now  He stated tomorrow he will make a complaint

## 2022-04-19 NOTE — PROGRESS NOTES
SW received inbasket from ZULEYMA Mujica informing this SW that pt is in inpatient and [place] is being submitted for STR  No socially complex episode in place for this pt at this time

## 2022-04-19 NOTE — PROGRESS NOTES
Phan 45  Progress Note Gudelia Baptiste 1960, 57 y o  male MRN: [id number]  Unit/Bed#: [address] [place] [address] Encounter: [id number]  Primary Care Provider: Garrison Cantor MD   Date and time admitted to hospital: 4/17/2022  2:20 PM       * Chronic pain syndrome  Assessment & Plan  Follows outpatient pain management  In the setting of degenerative disc disease - s/p recent epidural steroid injection  Admitted for progressive/worsening pain w/ associated ambulatory dysfunction  Continue ongoing PT/OT evaluation - currently recommending skilled rehab awaiting placement/insurance authorization  Optimize pain control    Atrial fibrillation  Assessment & Plan  Controlled on Digoxin/Toprol-XL  Continue Eliquis for anticoagulation    Morbid obesity   Assessment & Plan  BMI of 38 17  Lifestyle/diet modifications    Psychiatric disorder  Assessment & Plan  Continue Zoloft/Seroquel/Buspar/Xanax  Outpatient follow-up    Essential hypertension  Assessment & Plan  Low-sodium diet  Continue Aldactone/Cozaar/Toprol-XL    Coronary artery disease  Assessment & Plan  Continue ASA/Lipitor/Lopressor/Cozaar    GERD  Assessment & Plan  Continue PPI regimen    Chronic diastolic CHF  Assessment & Plan  Low-sodium diet w/ fluid restriction  Continue Digoxin/Lopressor  On diuresis w/ Demadex/Aldactone  Monitor replete serum potassium/magnesium deficiencies if present  Echocardiogram earlier this year with an EF of 55% with biatrial dilatation, mild AK, but no evidence of regional LV wall motion abnormalities    Insulin-dependent diabetes mellitus with neuropathy  Assessment & Plan  Lab Results   Component Value Date    HGBA1C 8 6 (H) 03/25/2022     Continue basal insulin w/ additional SSI coverage per Accu-Cheks  Carbohydrate restricted diet  On Gabapentin for neuropathy    SHAVONNE and COPD overlap syndrome   Assessment & Plan  Continue BIPAP QHS  C/w Breo-Ellipta and Albuterol nebulization  Encourage weight loss    Leukocytosis  Assessment & Plan  Likely reactive acute medical issue(s) and recent steroid injection  Monitor WBC count    Hyponatremia  Assessment & Plan  Chronic/stable issue in the setting of CHF  Monitor serum sodium      DVT Prophylaxis:  Eliquis     Patient Centered Rounds:  I have performed bedside rounds and discussed plan of care with nursing today  Discussions with Specialists or Other Care Team Provider:  see above assessments if applicable    Education and Discussions with Family / Patient:  Patient at bedside    Time Spent for Care:  32 minutes  More than 50% of total time spent on counseling and coordination of care as described above  Current Length of Stay: 2 day(s)    Current Patient Status: Inpatient   Certification Statement:  Patient will continue to require additional hospital stay due to assessments as noted above  Code Status: Level 1 - Full Code        Subjective:     Seen/examined earlier today  Remains weak/fatigued stating that his back discomfort/pain is waxing/waning but somewhat improved  Continues to work with physical therapy  Objective:     Vitals:   Temp (24hrs), Av °F (36 7 °C), Min:97 7 °F (36 5 °C), Max:98 1 °F (36 7 °C)    Temp:  [97 7 °F (36 5 °C)-98 1 °F (36 7 °C)] 97 7 °F (36 5 °C)  HR:  [57-68] 68  Resp:  [16-24] 22  BP: (103-158)/(55-72) 103/55  SpO2:  [95 %-100 %] 96 %  Body mass index is 38 17 kg/m²  Input and Output Summary (last 24 hours):        Intake/Output Summary (Last 24 hours) at 2022 1649  Last data filed at 2022 1001  Gross per 24 hour   Intake 480 ml   Output 500 ml   Net -20 ml       Physical Exam:     GENERAL:  Obese - weak/fatigued  HEAD:  Normocephalic - atraumatic  EYES: PERRL - EOMI   MOUTH:  Mucosa moist  NECK:  Supple - full range of motion  CARDIAC:  Rate controlled currently - S1/S2 positive  PULMONARY:  Diminished at the bases with decreased respiratory effort due to body habitus - nonlabored respirations  ABDOMEN:  Soft - nontender/nondistended - active bowel sounds  MUSCULOSKELETAL:  Motor strength/range of motion deconditioned - paraspinal tenderness present  NEUROLOGIC:  Alert/oriented at baseline  SKIN:  Chronic wrinkles/blemishes   PSYCHIATRIC:  Mood/affect stable      Additional Data:     Labs & Recent Cultures:    Results from last 7 days   Lab Units 04/18/22  0532   WBC Thousand/uL 9 76   HEMOGLOBIN g/dL 12 2   HEMATOCRIT % 35 4*   PLATELETS Thousands/uL 153   NEUTROS PCT % 47   LYMPHS PCT % 45*   MONOS PCT % 7   EOS PCT % 1     Results from last 7 days   Lab Units 04/17/22  1453   POTASSIUM mmol/L 4 8   CHLORIDE mmol/L 94*   CO2 mmol/L 24   BUN mg/dL 11   CREATININE mg/dL 0 99   CALCIUM mg/dL 8 9   ALK PHOS U/L 48   ALT U/L 40   AST U/L 19         Results from last 7 days   Lab Units 04/19/22  1641 04/19/22  1136 04/19/22  0757 04/18/22  2259 04/18/22  2042 04/18/22  1558 04/18/22  1129 04/18/22  0753 04/17/22  2033 04/15/22  1102   POC GLUCOSE mg/dl 127 132 95 187* 77 135 161* 127 183* 151*                         Last 24 Hours Medication List:   Current Facility-Administered Medications   Medication Dose Route Frequency Provider Last Rate    acetaminophen  650 mg Oral Q6H PRN Agape L Lila, DO      albuterol  2 5 mg Nebulization 4x Daily Lashae June MD      ALPRAZolam  2 mg Oral HS Monicape L Lila, DO      apixaban  5 mg Oral BID Agape L Lila, DO      aspirin  81 mg Oral Daily Monicape L Lila, DO      atorvastatin  80 mg Oral Daily Agape L Lila, DO      busPIRone  10 mg Oral BID Agape Tierney Rhyme, DO      cholecalciferol  1,000 Units Oral Daily Agape L Lila, DO      dextromethorphan-guaiFENesin  5 mL Oral Q8H PRN Agape Tierney Rhyme, DO      Diclofenac Sodium  2 g Topical 4x Daily PRN Agape Tierney Rhyme, DO      digoxin  250 mcg Oral Daily Agape L Lila, DO      fish oil  2,000 mg Oral Daily Agape L Lila, DO      fluticasone-vilanterol  1 puff Inhalation Daily Monicape L Lila, DO      gabapentin  300 mg Oral TID Jef Sharp, DO      insulin glargine  75 Units Subcutaneous Q12H [facility] Jef Sharp, DO      insulin lispro  2-12 Units Subcutaneous 4x Daily (AC & HS) Mika Rushing MD      insulin lispro  35 Units Subcutaneous TID With Meals Jef Bautista, DO      ketorolac  15 mg Intravenous Q6H PRN Mika Rushing MD      ketorolac  30 mg Intravenous Q6H PRN Mika Rushing MD      lidocaine   Topical Q6H Mika Rushing MD      losartan  50 mg Oral Daily Agacat Sharp, DO      metoprolol succinate  200 mg Oral Daily Agape L Lila, DO      pantoprazole  20 mg Oral Early Morning Agacat Sharp, DO      potassium chloride  20 mEq Oral Daily Jef Sharp, DO      QUEtiapine  100 mg Oral QAM Jef Sharp, DO      QUEtiapine  300 mg Oral HS Agape L Sharp, DO      sertraline  50 mg Oral Daily Agape L Lila, DO      spironolactone  25 mg Oral Daily Jef Sharp, DO      torsemide  40 mg Oral Daily Jef Bautista, DO                    ** Please Note: This note is constructed using a voice recognition dictation system  An occasional wrong word/phrase or sound-a-like substitution may have been picked up by dictation device due to the inherent limitations of voice recognition software  Read the chart carefully and recognize, using reasonable context, where substitutions may have occurred  **

## 2022-04-19 NOTE — PHYSICAL THERAPY NOTE
PHYSICAL THERAPY EVALUATION/TREATMENT     04/19/22 0955   Note Type   Note type Evaluation   Pain Assessment   Pain Assessment Tool 0-10   Pain Score 10 - Worst Possible Pain  (central LS area with all movement)   Restrictions/Precautions   Other Precautions Chair Alarm; Bed Alarm; Fall Risk;Pain   Home Living   Type of [address]; One level   [address] [place] [address] chair   Home Equipment Walker;Cane;Wheelchair-electric  (hoverround chair)   Additional Comments Patient states using electric wheelchair in home at most times   Prior Function   Level of Cole Independent with ADLs and functional mobility   Lives With Alone   Receives Help From   (Patient states home health aide ending about a month ago)   ADL Assistance Independent   IADLs Needs assistance   Falls in the last 6 months 1 to 4   Comments Patient states falling at home due to back pain, and using cane at most times for gait   General   Additional Pertinent History Chart reviewed, patient admitted with chronic pain syndrome and falls    Patient now presents as generally weak deconditioned and with limited functional mobility due to all above and pain   Family/Caregiver Present Yes   Cognition   Overall Cognitive Status WFL   Arousal/Participation Cooperative   Attention Within functional limits   Orientation Level Oriented X4   Following Commands Follows multistep commands with increased time or repetition   Comments Patient distracted at times due to LS pain, nurse aware and has medicated as appropriate   Subjective   Subjective Patient states needing a home health aide at home for more assist   RLE Assessment   RLE Assessment   (ROM WFL, strength 3+/5)   LLE Assessment   LLE Assessment   (ROM WFL, strength 3+/5)   Coordination   Movements are Fluid and Coordinated 0   Bed Mobility   Supine to Sit 7  Independent   Sit to Supine 7  Independent   Transfers   Sit to Stand 4  Minimal assistance   Additional items Assist x 1   Stand to Sit 4  Minimal assistance   Additional items Assist x 1   Ambulation/Elevation   Gait Assistance 4  Minimal assist   Additional items Assist x 1;Verbal cues; Tactile cues   Assistive Device Rolling walker   Distance 10 ft with change in direction, slow gait speed narrow base of support and shortened stride length all noted   Balance   Static Sitting Fair +   Dynamic Sitting Fair   Static Standing Fair   Dynamic Standing Fair -   Ambulatory Fair -   Activity Tolerance   Activity Tolerance Patient limited by fatigue;Patient limited by pain   Nurse Made Aware yes   Assessment   Prognosis Good   Problem List Decreased strength;Decreased range of motion;Decreased endurance; Impaired balance;Decreased mobility; Decreased coordination;Pain   Assessment Patient seen for Physical Therapy evaluation  Patient admitted with Chronic pain syndrome  Comorbidities affecting patient's physical performance include:Afib, Bipolar disorder, CHF, COPD, diabetes and hypertension     Personal factors affecting patient at time of initial evaluation include: ambulating with assistive device, inability to ambulate household distances, inability to navigate community distances, inability to navigate level surfaces without external assistance, inability to perform dynamic tasks in community, limited home support, positive fall history, inability to perform physical activity, inability to perform ADLS and inability to perform IADLS   Prior to admission, patient was independent with functional mobility with WC, walker or cane, independent with ADLS, requiring assist for IADLS and ambulating household distance    Please find objective findings from Physical Therapy assessment regarding body systems outlined above with impairments and limitations including weakness, decreased ROM, impaired balance, decreased endurance, impaired coordination, gait deviations, pain, decreased activity tolerance, decreased functional mobility tolerance and fall risk  The Barthel Index was used as a functional outcome tool presenting with a score of Barthel Index Score: 50 today indicating marked limitations of functional mobility and ADLS  Patient's clinical presentation is currently unstable/unpredictable as seen in patient's presentation of vital sign response, changing level of pain, increased fall risk, new onset of impairment of functional mobility, decreased endurance and new onset of weakness  Pt would benefit from continued Physical Therapy treatment to address deficits as defined above and maximize level of functional mobility  As demonstrated by objective findings, the assigned level of complexity for this evaluation is high  The patient's [facility] Basic Mobility Inpatient Short Form Raw Score is 18  A Raw score of greater than 16 suggests the patient may benefit from discharge to home, although patient living alone unable to care for self and with limited mobility due to pain therefore will benefit from short-term rehab to regain independence for return home  Please also refer to the recommendation of the Physical Therapist for safe discharge planning  Goals   Patient Goals To decrease pain and get stronger   STG Expiration Date 04/26/22   Short Term Goal #1 Transfers and gait independently with roller walker   Short Term Goal #2 Gait endurance to functional household distances   LTG Expiration Date 05/03/22   Long Term Goal #1 Strength bilateral lower extremities 4-/5   Long Term Goal #2 Gait endurance to functional community distances as tolerated with roller walker   Plan   Treatment/Interventions ADL retraining;Functional transfer training;LE strengthening/ROM; Therapeutic exercise; Endurance training;Patient/family training;Equipment eval/education;Gait training; Compensatory technique education   PT Frequency Other (Comment)  (5 times per week)   Recommendation   PT Discharge Recommendation Post acute rehabilitation services   [facility] Basic Mobility Inpatient   Turning in Bed Without Bedrails 4   Lying on Back to Sitting on Edge of Flat Bed 4   Moving Bed to Chair 3   Standing Up From Chair 3   Walk in Room 3   Climb 3-5 Stairs 1   Basic Mobility Inpatient Raw Score 18   Basic Mobility Standardized Score 41 05   Highest Level Of Mobility   JH-HLM Goal 6: Walk 10 steps or more   JH-HLM Highest Level of Mobility 6: Walk 10 steps or more   JH-HLM Goal Achieved Yes   Barthel Index   Feeding 10   Bathing 0   Grooming Score 0   Dressing Score 5   Bladder Score 10   Bowels Score 10   Toilet Use Score 5   Transfers (Bed/Chair) Score 10   Mobility (Level Surface) Score 0   Stairs Score 0   Barthel Index Score 50   Additional Treatment Session   Start Time 0940   End Time 0955   Treatment Assessment S:  Patient reports 10/10 LS pain with movement despite medication O:  Bilateral lower extremity exercise completed as listed below  Gait training with single-point cane with Min assist of 1 10 ft with standing balance activity completed as well a:  Education completed for use of roller walker for safety and balance to prevent falls  Patient will benefit from continued physical therapy with progression as tolerated   Exercises   Hamstring Stretch Supine;5 reps;PROM; Bilateral   Hip Flexion Sitting;5 reps;Bilateral   Hip Abduction Sitting;5 reps;Bilateral   Knee AROM Long Arc Quad Sitting;5 reps;Bilateral   Ankle Pumps Sitting;5 reps;Bilateral   Marching Standing;5 reps;Bilateral   Balance training  Sidestepping and backward walking completed for balance   Licensure   [place] License Number  Benjamin Fang PT [id number]

## 2022-04-19 NOTE — ASSESSMENT & PLAN NOTE
Follows outpatient pain management  In the setting of degenerative disc disease - s/p recent epidural steroid injection  Admitted for progressive/worsening pain w/ associated ambulatory dysfunction  Continue ongoing PT/OT evaluation - currently recommending skilled rehab awaiting placement/insurance authorization  Optimize pain control

## 2022-04-19 NOTE — CASE MANAGEMENT
Case Management Progress Note    Patient name Deejay Quiroz  Location 3 [place] 326/3 TFOGE 811-* MRN [id number]  : 1960 Date 2022       LOS (days): 2  Geometric Mean LOS (GMLOS) (days): 2 90  Days to GMLOS:1        OBJECTIVE:  Current admission status: Inpatient  Preferred Pharmacy:   [address] [place] [id number]  Phone: [phone] Fax: [phone]    Primary Care Provider: Harrison Enrique MD  Primary Insurance: MEDICARE [id number]  Secondary Insurance:     PROGRESS NOTE:    CM spoke with Infor at [facility] [address] and patient is accepted at the [facility] and can go over today if auth obtained  CM submitted STR auth tasked to [employer] in [facility]  CM to follow

## 2022-04-19 NOTE — UTILIZATION REVIEW
Continued Stay Review    Date: 4/19                          Current Patient Class: INpatient   Current Level of Care: Med/surg    HPI:61 y o  male initially admitted on 4/17     Assessment/Plan:   Case management working on Tamiko Jennings to [facility]      Vital Signs:   Date/Time Temp Pulse Resp BP MAP (mmHg) SpO2 Calculated FIO2 (%) - Nasal Cannula O2 Flow Rate (L/min) Nasal Cannula O2 Flow Rate (L/min) O2 Device O2 Interface Device Patient Position - Orthostatic VS   04/19/22 1536 97 7 °F (36 5 °C) 68 22 103/55 73 96 % 28 -- 2 L/min Nasal cannula -- Lying   04/19/22 1106 -- 67 18 -- -- 98 % 28 2 L/min 2 L/min Nasal cannula -- --   04/19/22 0718 98 °F (36 7 °C) 64 18 131/71 96 99 % 28 -- 2 L/min Nasal cannula -- Lying   04/19/22 0517 -- 60 18 -- -- 95 % -- 2 L/min -- BiPAP Face mask --   04/19/22 0319 -- 58 16 -- -- 97 % -- 2 L/min -- BiPAP Face mask      Pertinent Labs/Diagnostic Results:     Results from last 7 days   Lab Units 04/17/22  1453   SARS-COV-2  Negative     Results from last 7 days   Lab Units 04/18/22  0532 04/17/22  1453   WBC Thousand/uL 9 76 15 88*   HEMOGLOBIN g/dL 12 2 13 2   HEMATOCRIT % 35 4* 39 5   PLATELETS Thousands/uL 153 187   NEUTROS ABS Thousands/µL 4 63  --          Results from last 7 days   Lab Units 04/17/22  1453   SODIUM mmol/L 128*   POTASSIUM mmol/L 4 8   CHLORIDE mmol/L 94*   CO2 mmol/L 24   ANION GAP mmol/L 10   BUN mg/dL 11   CREATININE mg/dL 0 99   EGFR ml/min/1 73sq m 81   CALCIUM mg/dL 8 9   MAGNESIUM mg/dL 1 8   PHOSPHORUS mg/dL 2 9     Results from last 7 days   Lab Units 04/17/22  1453   AST U/L 19   ALT U/L 40   ALK PHOS U/L 48   TOTAL PROTEIN g/dL 6 9   ALBUMIN g/dL 4 0   TOTAL BILIRUBIN mg/dL 0 66     Results from last 7 days   Lab Units 04/19/22  1136 04/19/22  0757 04/18/22  2259 04/18/22  2042 04/18/22  1558 04/18/22  1129 04/18/22  0753 04/17/22 2033 04/15/22  1102   POC GLUCOSE mg/dl 132 95 187* 77 135 161* 127 183* 151*     Results from last 7 days   Lab Units 04/17/22  1454 GLUCOSE RANDOM mg/dL 190*             BETA-HYDROXYBUTYRATE   Date Value Ref Range Status   08/07/2019 0 1 <0 6 mmol/L Final          Results from last 7 days   Lab Units 04/17/22  1453   INFLUENZA A PCR  Negative   INFLUENZA B PCR  Negative   RSV PCR  Negative       Medications:   Scheduled Medications:  ALPRAZolam, 2 mg, Oral, HS  apixaban, 5 mg, Oral, BID  aspirin, 81 mg, Oral, Daily  atorvastatin, 80 mg, Oral, Daily  busPIRone, 10 mg, Oral, BID  cholecalciferol, 1,000 Units, Oral, Daily  digoxin, 250 mcg, Oral, Daily  fish oil, 2,000 mg, Oral, Daily  fluticasone-vilanterol, 1 puff, Inhalation, Daily  gabapentin, 300 mg, Oral, TID  insulin glargine, 75 Units, Subcutaneous, Q12H LELAND  insulin lispro, 2-12 Units, Subcutaneous, 4x Daily (AC & HS)  insulin lispro, 35 Units, Subcutaneous, TID With Meals  lidocaine, , Topical, Q6H  liraglutide, 1 8 mg, Subcutaneous, Daily  losartan, 50 mg, Oral, Daily  metoprolol succinate, 200 mg, Oral, Daily  pantoprazole, 20 mg, Oral, Early Morning  potassium chloride, 20 mEq, Oral, Daily  QUEtiapine, 100 mg, Oral, QAM  QUEtiapine, 300 mg, Oral, HS  sertraline, 50 mg, Oral, Daily  spironolactone, 25 mg, Oral, Daily  torsemide, 40 mg, Oral, Daily      Continuous IV Infusions:     PRN Meds:  acetaminophen, 650 mg, Oral, Q6H PRN  albuterol, 2 5 mg, Nebulization, Q6H PRN  dextromethorphan-guaiFENesin, 5 mL, Oral, Q8H PRN  Diclofenac Sodium, 2 g, Topical, 4x Daily PRN  ketorolac, 15 mg, Intravenous, Q6H PRN  ketorolac, 30 mg, Intravenous, Q6H PRN x1        Discharge Plan: D    Network Utilization Review Department  ATTENTION: Please call with any questions or concerns to [phone] and carefully listen to the prompts so that you are directed to the right person   All voicemails are confidential   Penelope Frias all requests for admission clinical reviews, approved or denied determinations and any other requests to dedicated fax number below belonging to the campus where the patient is receiving treatment   List of dedicated fax numbers for the Facilities:  [address] [facility] [address] DENIALS (Administrative/Medical Necessity) [phone]   [address] (Maternity/NICU/Pediatrics) [phone] [address] [place] [address]  [address] [place] [address] [address] [place] [address] [address] [facility] [address] [phone]

## 2022-04-19 NOTE — ASSESSMENT & PLAN NOTE
Lab Results   Component Value Date    HGBA1C 8 6 (H) 03/25/2022     Continue basal insulin w/ additional SSI coverage per Accu-Cheks  Carbohydrate restricted diet  On Gabapentin for neuropathy

## 2022-04-19 NOTE — CASE MANAGEMENT
Case Management Progress Note    Patient name Matthew Colmenares  Location 3 [facility] 326/3 [address] 301-* MRN [id number]  : 1960 Date 2022       LOS (days): 2  Geometric Mean LOS (GMLOS) (days): 2 90  Days to GMLOS:1 2        OBJECTIVE:        Current admission status: Inpatient  Preferred Pharmacy:   [address] [place] [address], [address] [place] [id number]  Phone: [phone] Fax: [phone]    Primary Care Provider: Delfino Page MD    Primary Insurance: MEDICARE [address] [facility]  Secondary Insurance:     PROGRESS NOTE:    CM spoke with Melvin Patelmsted at [address] [facility] [address] and patient is accepted at the [facility] once authorization obtained  Patient has not been seen by PT yet and CM messaged PT and requested he be seen this am     CM to follow

## 2022-04-19 NOTE — CASE MANAGEMENT
Case Management Discharge Planning Note    Patient name Jessica Rome  Location 3 [place] 326/3 [place] 18-* MRN [id number]  : 1960 Date 2022       Current Admission Date: 2022  Current Admission Diagnosis:Chronic pain syndrome   Patient Active Problem List    Diagnosis Date Noted    Chronic pain syndrome 2022    Foraminal stenosis of lumbar region 2022    Lumbar post-laminectomy syndrome 2022    Lumbar radiculopathy 2022    Shortness of breath 2022    SIRS (systemic inflammatory response syndrome) ([facility] ) 2022    Dysuria 2021    Peripheral neuropathy 2021    Chronic left-sided low back pain with left-sided sciatica 2021    BMI 40 0-44 9, adult ([facility] ) 2021    Muscle weakness (generalized) 2021    Platelets decreased ([facility] ) 2021    Medicare annual wellness visit, subsequent 2021    Chronic congestive heart failure ([facility] ) 2020    Leukocytosis 10/29/2020    Hyperlipidemia 10/29/2020    Nutritional anemia, unspecified  10/29/2020    Chest pain 10/11/2020    Simple chronic bronchitis [facility] ) 10/11/2020    PAF (paroxysmal atrial fibrillation) ([facility] ) 2020    Hyperglycemia 2020    Transition of care performed with sharing of clinical summary 2020    Obstructive sleep apnea 2020    Obesity (BMI 30-39 9) 2020    Stage 2 chronic kidney disease 2020    Hyponatremia 2020    COPD (chronic obstructive pulmonary disease) ([facility] ) 2020    Chronic a-fib ([facility] ) 2020    H/O vitamin D deficiency 10/28/2019    Dyslipidemia 2019    Type 2 diabetes mellitus with complication, with long-term current use of insulin ([facility] ) 2019    Restrictive ventilatory defect 2019    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 [facility]) 2019    Lung disease, restrictive 2018    Chronic respiratory failure with hypoxia ([facility] ) 10/31/2018    Coronary artery disease involving native heart 09/06/2017    Esophageal reflux 09/06/2017    Back ache 11/30/2016    SHAVONNE and COPD overlap syndrome ([place] [address] )     Morbid obesity ([place] [address] )     Uncontrolled type 2 diabetes mellitus with hyperglycemia ([place] [address] ) 09/02/2016    Fatigue 09/02/2016    Chronic reflux esophagitis 06/08/2016    Cough 01/13/2016    COPD with exacerbation ([place] [address] ) 01/13/2016    Bipolar affective disorder ([place] [address] )     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/25/2015    Erectile dysfunction of organic origin 08/25/2014    Benign essential hypertension 09/04/2012    Diabetic neuropathy (HCC) 09/04/2012    Panic disorder without agoraphobia 09/04/2012    Vitamin D deficiency 09/04/2012      LOS (days): 2  Geometric Mean LOS (GMLOS) (days): 2 90  Days to GMLOS:1 1     OBJECTIVE:  Risk of Unplanned Readmission Score: 28         Current admission status: Inpatient   Preferred Pharmacy:   [address] [place], [place] [id number]  Phone: [phone] Fax: [phone]    Primary Care Provider: Marquis Rajesh MD    Primary Insurance: MEDICARE MISC REPLACEMENT  Secondary Insurance:     DISCHARGE DETAILS:                                                                                                               Facility Insurance Auth Number: SNF auth tasked to DC support from [address] to start SNF auth at p# [phone], left message on auth line  Found forms online to submit auth request, submitted request to fax # [phone] for [facility]: 4/19/22 for [facility] [place]: NPI: [id number] Accepting MD: Dr Neri Nunez: [id number]

## 2022-04-19 NOTE — PLAN OF CARE
Problem: MOBILITY - ADULT  Goal: Maintain or return to baseline ADL function  Description: INTERVENTIONS:  -  Assess patient's ability to carry out ADLs; assess patient's baseline for ADL function and identify physical deficits which impact ability to perform ADLs (bathing, care of mouth/teeth, toileting, grooming, dressing, etc )  - Assess/evaluate cause of self-care deficits   - Assess range of motion  - Assess patient's mobility; develop plan if impaired  - Assess patient's need for assistive devices and provide as appropriate  - Encourage maximum independence but intervene and supervise when necessary  - Involve family in performance of ADLs  - Assess for home care needs following discharge   - Consider OT consult to assist with ADL evaluation and planning for discharge  - Provide patient education as appropriate  Outcome: Progressing  Goal: Maintains/Returns to pre admission functional level  Description: INTERVENTIONS:  - Perform BMAT or MOVE assessment daily    - Set and communicate daily mobility goal to care team and patient/family/caregiver  - Collaborate with rehabilitation services on mobility goals if consulted  - Perform Range of Motion 3 times a day  - Reposition patient every 2 hours    - Dangle patient 3 times a day  - Stand patient 3 times a day  - Ambulate patient 3 times a day  - Out of bed to chair 3 times a day   - Out of bed for meals 3 times a day  - Out of bed for toileting  - Record patient progress and toleration of activity level   Outcome: Progressing     Problem: Potential for Falls  Goal: Patient will remain free of falls  Description: INTERVENTIONS:  - Educate patient/family on patient safety including physical limitations  - Instruct patient to call for assistance with activity   - Consult OT/PT to assist with strengthening/mobility   - Keep Call bell within reach  - Keep bed low and locked with side rails adjusted as appropriate  - Keep care items and personal belongings within reach  - Initiate and maintain comfort rounds  - Make Fall Risk Sign visible to staff  - Offer Toileting every 2 Hours, in advance of need  - Initiate/Maintain bed alarm  - Obtain necessary fall risk management equipment: Cane  - Apply yellow socks and bracelet for high fall risk patients  - Consider moving patient to room near nurses station  Outcome: Progressing     Problem: PAIN - ADULT  Goal: Verbalizes/displays adequate comfort level or baseline comfort level  Description: Interventions:  - Encourage patient to monitor pain and request assistance  - Assess pain using appropriate pain scale  - Administer analgesics based on type and severity of pain and evaluate response  - Implement non-pharmacological measures as appropriate and evaluate response  - Consider cultural and social influences on pain and pain management  - Notify physician/advanced practitioner if interventions unsuccessful or patient reports new pain  Outcome: Progressing     Problem: RESPIRATORY - ADULT  Goal: Achieves optimal ventilation and oxygenation  Description: INTERVENTIONS:  - Assess for changes in respiratory status  - Assess for changes in mentation and behavior  - Position to facilitate oxygenation and minimize respiratory effort  - Oxygen administered by appropriate delivery if ordered  - Initiate smoking cessation education as indicated  - Encourage broncho-pulmonary hygiene including cough, deep breathe, Incentive Spirometry  - Assess the need for suctioning and aspirate as needed  - Assess and instruct to report SOB or any respiratory difficulty  - Respiratory Therapy support as indicated  Outcome: Progressing

## 2022-04-19 NOTE — CASE MANAGEMENT
Case Management Discharge Planning Note    Patient name Navjot Islas  Location 3 [address] 18-* MRN [id number]  : 1960 Date 2022       Current Admission Date: 2022  Current Admission Diagnosis:Chronic pain syndrome   Patient Active Problem List    Diagnosis Date Noted    Chronic pain syndrome 2022    Foraminal stenosis of lumbar region 2022    Lumbar post-laminectomy syndrome 2022    Lumbar radiculopathy 2022    Shortness of breath 2022    S[facility]systemic inflammatory response syndrome) [facility] 2022    Dysuria 2021    Peripheral neuropathy 2021    Chronic left-sided low back pain with left-sided sciatica 2021    BMI 40 0-44 9, adult ([facility] ) 2021    Muscle weakness (generalized) 2021    Platelets decreased [facility] 2021    Medicare annual wellness visit, subsequent 2021    Chronic congestive heart failure ([facility] ) 2020    Leukocytosis 10/29/2020    Hyperlipidemia 10/29/2020    Nutritional anemia, unspecified  10/29/2020    Chest pain 10/11/2020    Simple chronic bronchitis ([facility] ) 10/11/2020    PAF (paroxysmal atrial fibrillation) ([facility] ) 2020    Hyperglycemia 2020    Transition of care performed with sharing of clinical summary 2020    Obstructive sleep apnea 2020    Obesity (BMI 30-39 9) 2020    Stage 2 chronic kidney disease 2020    Hyponatremia 2020    COPD (chronic obstructive pulmonary disease) ([facility] ) 2020    Chronic a-fib ([facility] ) 2020    H/O vitamin D deficiency 10/28/2019    Dyslipidemia 2019    Type 2 diabetes mellitus with complication, with long-term current use of insulin ([facility] ) 2019    Restrictive ventilatory defect 2019    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 [facility]) 2019    Lung disease, restrictive 2018    Chronic respiratory failure with hypoxia ([facility] ) 10/31/2018    Coronary artery disease involving native heart 09/06/2017    Esophageal reflux 09/06/2017    Back ache 11/30/2016    SHAVONNE and COPD overlap syndrome ([place] [address] )     Morbid obesity ([place] [address] )     Uncontrolled type 2 diabetes mellitus with hyperglycemia ([place] [address] ) 09/02/2016    Fatigue 09/02/2016    Chronic reflux esophagitis 06/08/2016    Cough 01/13/2016    COPD with exacerbation ([place] [address] ) 01/13/2016    Bipolar affective disorder ([place] [address] )     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/25/2015    Erectile dysfunction of organic origin 08/25/2014    Benign essential hypertension 09/04/2012    Diabetic neuropathy ([place] [address] ) 09/04/2012    Panic disorder without agoraphobia 09/04/2012    Vitamin D deficiency 09/04/2012      LOS (days): 2  Geometric Mean LOS (GMLOS) (days): 2 90  Days to GMLOS:1     OBJECTIVE:  Risk of Unplanned Readmission Score: 29         Current admission status: Inpatient   Preferred Pharmacy:   [address] [place] [address]  Phone: [phone] Fax: [phone]    Primary Care Provider: Barbara Rothman MD    Primary Insurance:   Secondary Insurance:     DISCHARGE DETAILS:    Discharge planning discussed with[de-identified] Patient  Freedom of Choice: Yes  Comments - Freedom of Choice: [facility] contacted family/caregiver?: Yes  Were Treatment Team discharge recommendations reviewed with patient/caregiver?: Yes  Did patient/caregiver verbalize understanding of patient care needs?: Yes  Were patient/caregiver advised of the risks associated with not following Treatment Team discharge recommendations?: Yes     IMM Given (Date):: 04/19/22  IMM Given to[de-identified] Patient (IMM was discussed with patient and he states understanding )     CM met with patient and confirmed [place] plan to [facility] [place]  He is aware that Jorge Manzanares was submitted and is very eager to go  CM will need to set up transport at [place]

## 2022-04-19 NOTE — ASSESSMENT & PLAN NOTE
Low-sodium diet w/ fluid restriction  Continue Digoxin/Lopressor  On diuresis w/ Demadex/Aldactone  Monitor replete serum potassium/magnesium deficiencies if present  Echocardiogram earlier this year with an EF of 55% with biatrial dilatation, mild WA, but no evidence of regional LV wall motion abnormalities

## 2022-04-20 LAB
ANION GAP SERPL CALCULATED.3IONS-SCNC: 10 MMOL/L (ref 4–13)
BASOPHILS # BLD AUTO: 0.02 THOUSANDS/ΜL (ref 0–0.1)
BASOPHILS NFR BLD AUTO: 0 % (ref 0–1)
BUN SERPL-MCNC: 17 MG/DL (ref 5–25)
CALCIUM SERPL-MCNC: 8.8 MG/DL (ref 8.3–10.1)
CHLORIDE SERPL-SCNC: 91 MMOL/L (ref 100–108)
CO2 SERPL-SCNC: 27 MMOL/L (ref 21–32)
CREAT SERPL-MCNC: 0.97 MG/DL (ref 0.6–1.3)
EOSINOPHIL # BLD AUTO: 0.14 THOUSAND/ΜL (ref 0–0.61)
EOSINOPHIL NFR BLD AUTO: 1 % (ref 0–6)
ERYTHROCYTE [DISTWIDTH] IN BLOOD BY AUTOMATED COUNT: 13.1 % (ref 11.6–15.1)
GFR SERPL CREATININE-BSD FRML MDRD: 83 ML/MIN/1.73SQ M
GLUCOSE SERPL-MCNC: 110 MG/DL (ref 65–140)
GLUCOSE SERPL-MCNC: 156 MG/DL (ref 65–140)
GLUCOSE SERPL-MCNC: 173 MG/DL (ref 65–140)
GLUCOSE SERPL-MCNC: 181 MG/DL (ref 65–140)
GLUCOSE SERPL-MCNC: 257 MG/DL (ref 65–140)
HCT VFR BLD AUTO: 37 % (ref 36.5–49.3)
HGB BLD-MCNC: 12.4 G/DL (ref 12–17)
IMM GRANULOCYTES # BLD AUTO: 0.05 THOUSAND/UL (ref 0–0.2)
IMM GRANULOCYTES NFR BLD AUTO: 0 % (ref 0–2)
LYMPHOCYTES # BLD AUTO: 6.37 THOUSANDS/ΜL (ref 0.6–4.47)
LYMPHOCYTES NFR BLD AUTO: 58 % (ref 14–44)
MAGNESIUM SERPL-MCNC: 1.5 MG/DL (ref 1.6–2.6)
MCH RBC QN AUTO: 30.5 PG (ref 26.8–34.3)
MCHC RBC AUTO-ENTMCNC: 33.5 G/DL (ref 31.4–37.4)
MCV RBC AUTO: 91 FL (ref 82–98)
MONOCYTES # BLD AUTO: 0.57 THOUSAND/ΜL (ref 0.17–1.22)
MONOCYTES NFR BLD AUTO: 5 % (ref 4–12)
NEUTROPHILS # BLD AUTO: 3.99 THOUSANDS/ΜL (ref 1.85–7.62)
NEUTS SEG NFR BLD AUTO: 36 % (ref 43–75)
NRBC BLD AUTO-RTO: 0 /100 WBCS
PLATELET # BLD AUTO: 175 THOUSANDS/UL (ref 149–390)
PMV BLD AUTO: 9.2 FL (ref 8.9–12.7)
POTASSIUM SERPL-SCNC: 4 MMOL/L (ref 3.5–5.3)
RBC # BLD AUTO: 4.07 MILLION/UL (ref 3.88–5.62)
SODIUM SERPL-SCNC: 128 MMOL/L (ref 136–145)
WBC # BLD AUTO: 11.14 THOUSAND/UL (ref 4.31–10.16)

## 2022-04-20 PROCEDURE — 94660 CPAP INITIATION&MGMT: CPT

## 2022-04-20 PROCEDURE — 80048 BASIC METABOLIC PNL TOTAL CA: CPT | Performed by: INTERNAL MEDICINE

## 2022-04-20 PROCEDURE — 99232 SBSQ HOSP IP/OBS MODERATE 35: CPT | Performed by: INTERNAL MEDICINE

## 2022-04-20 PROCEDURE — 83735 ASSAY OF MAGNESIUM: CPT | Performed by: INTERNAL MEDICINE

## 2022-04-20 PROCEDURE — 94760 N-INVAS EAR/PLS OXIMETRY 1: CPT

## 2022-04-20 PROCEDURE — 94640 AIRWAY INHALATION TREATMENT: CPT

## 2022-04-20 PROCEDURE — 82948 REAGENT STRIP/BLOOD GLUCOSE: CPT

## 2022-04-20 PROCEDURE — 85025 COMPLETE CBC W/AUTO DIFF WBC: CPT | Performed by: INTERNAL MEDICINE

## 2022-04-20 RX ORDER — MAGNESIUM SULFATE HEPTAHYDRATE 40 MG/ML
2 INJECTION, SOLUTION INTRAVENOUS ONCE
Status: COMPLETED | OUTPATIENT
Start: 2022-04-20 | End: 2022-04-20

## 2022-04-20 RX ADMIN — APIXABAN 5 MG: 5 TABLET, FILM COATED ORAL at 17:21

## 2022-04-20 RX ADMIN — INSULIN LISPRO 2 UNITS: 100 INJECTION, SOLUTION INTRAVENOUS; SUBCUTANEOUS at 21:40

## 2022-04-20 RX ADMIN — Medication 1000 UNITS: at 09:11

## 2022-04-20 RX ADMIN — BUSPIRONE HYDROCHLORIDE 10 MG: 10 TABLET ORAL at 17:21

## 2022-04-20 RX ADMIN — ACETAMINOPHEN 650 MG: 325 TABLET, FILM COATED ORAL at 17:21

## 2022-04-20 RX ADMIN — LOSARTAN POTASSIUM 50 MG: 50 TABLET, FILM COATED ORAL at 09:12

## 2022-04-20 RX ADMIN — POTASSIUM CHLORIDE 20 MEQ: 20 TABLET, EXTENDED RELEASE ORAL at 09:12

## 2022-04-20 RX ADMIN — ACETAMINOPHEN 650 MG: 325 TABLET, FILM COATED ORAL at 09:58

## 2022-04-20 RX ADMIN — SERTRALINE HYDROCHLORIDE 50 MG: 50 TABLET ORAL at 09:12

## 2022-04-20 RX ADMIN — MAGNESIUM SULFATE HEPTAHYDRATE 2 G: 40 INJECTION, SOLUTION INTRAVENOUS at 09:42

## 2022-04-20 RX ADMIN — LIDOCAINE: 40 CREAM TOPICAL at 05:25

## 2022-04-20 RX ADMIN — INSULIN LISPRO 35 UNITS: 100 INJECTION, SOLUTION INTRAVENOUS; SUBCUTANEOUS at 11:58

## 2022-04-20 RX ADMIN — ALBUTEROL SULFATE 2.5 MG: 2.5 SOLUTION RESPIRATORY (INHALATION) at 11:12

## 2022-04-20 RX ADMIN — KETOROLAC TROMETHAMINE 30 MG: 30 INJECTION, SOLUTION INTRAMUSCULAR at 05:25

## 2022-04-20 RX ADMIN — INSULIN LISPRO 35 UNITS: 100 INJECTION, SOLUTION INTRAVENOUS; SUBCUTANEOUS at 16:53

## 2022-04-20 RX ADMIN — ALBUTEROL SULFATE 2.5 MG: 2.5 SOLUTION RESPIRATORY (INHALATION) at 15:15

## 2022-04-20 RX ADMIN — INSULIN LISPRO 2 UNITS: 100 INJECTION, SOLUTION INTRAVENOUS; SUBCUTANEOUS at 16:53

## 2022-04-20 RX ADMIN — INSULIN GLARGINE 75 UNITS: 100 INJECTION, SOLUTION SUBCUTANEOUS at 09:11

## 2022-04-20 RX ADMIN — METOPROLOL SUCCINATE 200 MG: 100 TABLET, EXTENDED RELEASE ORAL at 09:12

## 2022-04-20 RX ADMIN — TORSEMIDE 40 MG: 20 TABLET ORAL at 09:11

## 2022-04-20 RX ADMIN — KETOROLAC TROMETHAMINE 30 MG: 30 INJECTION, SOLUTION INTRAMUSCULAR at 18:01

## 2022-04-20 RX ADMIN — SPIRONOLACTONE 25 MG: 25 TABLET ORAL at 09:12

## 2022-04-20 RX ADMIN — INSULIN LISPRO 35 UNITS: 100 INJECTION, SOLUTION INTRAVENOUS; SUBCUTANEOUS at 09:13

## 2022-04-20 RX ADMIN — FLUTICASONE FUROATE AND VILANTEROL TRIFENATATE 1 PUFF: 100; 25 POWDER RESPIRATORY (INHALATION) at 09:52

## 2022-04-20 RX ADMIN — APIXABAN 5 MG: 5 TABLET, FILM COATED ORAL at 09:12

## 2022-04-20 RX ADMIN — GABAPENTIN 300 MG: 300 CAPSULE ORAL at 17:21

## 2022-04-20 RX ADMIN — GABAPENTIN 300 MG: 300 CAPSULE ORAL at 21:40

## 2022-04-20 RX ADMIN — PANTOPRAZOLE SODIUM 20 MG: 20 TABLET, DELAYED RELEASE ORAL at 05:26

## 2022-04-20 RX ADMIN — ASPIRIN 81 MG: 81 TABLET, COATED ORAL at 09:12

## 2022-04-20 RX ADMIN — QUETIAPINE FUMARATE 300 MG: 100 TABLET ORAL at 21:39

## 2022-04-20 RX ADMIN — OMEGA-3 FATTY ACIDS CAP 1000 MG 2000 MG: 1000 CAP at 09:12

## 2022-04-20 RX ADMIN — BUSPIRONE HYDROCHLORIDE 10 MG: 10 TABLET ORAL at 09:12

## 2022-04-20 RX ADMIN — ALBUTEROL SULFATE 2.5 MG: 2.5 SOLUTION RESPIRATORY (INHALATION) at 07:34

## 2022-04-20 RX ADMIN — DICLOFENAC SODIUM TOPICAL GEL, 1%, 2 G: 10 GEL TOPICAL at 12:05

## 2022-04-20 RX ADMIN — GABAPENTIN 300 MG: 300 CAPSULE ORAL at 09:11

## 2022-04-20 RX ADMIN — LIDOCAINE: 40 CREAM TOPICAL at 17:22

## 2022-04-20 RX ADMIN — DIGOXIN 250 MCG: 125 TABLET ORAL at 09:11

## 2022-04-20 RX ADMIN — KETOROLAC TROMETHAMINE 15 MG: 30 INJECTION, SOLUTION INTRAMUSCULAR at 11:57

## 2022-04-20 RX ADMIN — LIDOCAINE: 40 CREAM TOPICAL at 23:03

## 2022-04-20 RX ADMIN — ATORVASTATIN CALCIUM 80 MG: 80 TABLET, FILM COATED ORAL at 09:12

## 2022-04-20 RX ADMIN — INSULIN LISPRO 2 UNITS: 100 INJECTION, SOLUTION INTRAVENOUS; SUBCUTANEOUS at 09:13

## 2022-04-20 RX ADMIN — ALBUTEROL SULFATE 2.5 MG: 2.5 SOLUTION RESPIRATORY (INHALATION) at 19:40

## 2022-04-20 RX ADMIN — QUETIAPINE FUMARATE 100 MG: 50 TABLET, EXTENDED RELEASE ORAL at 09:42

## 2022-04-20 RX ADMIN — INSULIN GLARGINE 75 UNITS: 100 INJECTION, SOLUTION SUBCUTANEOUS at 21:38

## 2022-04-20 RX ADMIN — INSULIN LISPRO 6 UNITS: 100 INJECTION, SOLUTION INTRAVENOUS; SUBCUTANEOUS at 11:58

## 2022-04-20 RX ADMIN — ALPRAZOLAM 2 MG: 0.5 TABLET ORAL at 21:39

## 2022-04-20 NOTE — PROGRESS NOTES
Patient still admitted with possible discharge to home with [facility]  Will follow-up on BiPAP use, COPD medication after discharge

## 2022-04-20 NOTE — ASSESSMENT & PLAN NOTE
Follows outpatient pain management  In the setting of degenerative disc disease - s/p recent epidural steroid injection  Admitted for progressive/worsening pain w/ associated ambulatory dysfunction  Continue ongoing PT/OT evaluation -> patient denied skilled rehab by insurance -> tentative plan for discharge home with home health services tomorrow with more optimal pain control

## 2022-04-20 NOTE — PLAN OF CARE
Problem: MOBILITY - ADULT  Goal: Maintain or return to baseline ADL function  Description: INTERVENTIONS:  -  Assess patient's ability to carry out ADLs; assess patient's baseline for ADL function and identify physical deficits which impact ability to perform ADLs (bathing, care of mouth/teeth, toileting, grooming, dressing, etc )  - Assess/evaluate cause of self-care deficits   - Assess range of motion  - Assess patient's mobility; develop plan if impaired  - Assess patient's need for assistive devices and provide as appropriate  - Encourage maximum independence but intervene and supervise when necessary  - Involve family in performance of ADLs  - Assess for home care needs following discharge   - Consider OT consult to assist with ADL evaluation and planning for discharge  - Provide patient education as appropriate  Outcome: Progressing  Goal: Maintains/Returns to pre admission functional level  Description: INTERVENTIONS:  - Perform BMAT or MOVE assessment daily    - Set and communicate daily mobility goal to care team and patient/family/caregiver  - Collaborate with rehabilitation services on mobility goals if consulted  - Perform Range of Motion 3 times a day  - Reposition patient every 2 hours    - Dangle patient 3 times a day  - Stand patient 3 times a day  - Ambulate patient 3 times a day  - Out of bed to chair 3 times a day   - Out of bed for meals 3 times a day  - Out of bed for toileting  - Record patient progress and toleration of activity level   Outcome: Progressing     Problem: Potential for Falls  Goal: Patient will remain free of falls  Description: INTERVENTIONS:  - Educate patient/family on patient safety including physical limitations  - Instruct patient to call for assistance with activity   - Consult OT/PT to assist with strengthening/mobility   - Keep Call bell within reach  - Keep bed low and locked with side rails adjusted as appropriate  - Keep care items and personal belongings within reach  - Initiate and maintain comfort rounds  - Make Fall Risk Sign visible to staff  - Offer Toileting every 2 Hours, in advance of need  - Initiate/Maintain bed alarm  - Obtain necessary fall risk management equipment: Cane  - Apply yellow socks and bracelet for high fall risk patients  - Consider moving patient to room near nurses station  Outcome: Progressing

## 2022-04-20 NOTE — CASE MANAGEMENT
Case Management Discharge Planning Note    Patient name Kari Smith  Location 3 [facility]/3 [facility] 18-* MRN [id number]  : 1960 Date 2022       Current Admission Date: 2022  Current Admission Diagnosis:Chronic pain syndrome   Patient Active Problem List    Diagnosis Date Noted    Chronic diastolic CHF     Chronic pain syndrome 2022    Foraminal stenosis of lumbar region 2022    Lumbar post-laminectomy syndrome 2022    Lumbar radiculopathy 2022    Shortness of breath 2022    SIRS (systemic inflammatory response syndrome) ([facility] ) 2022    Dysuria 2021    Peripheral neuropathy 2021    Chronic left-sided low back pain with left-sided sciatica 2021    BMI 40 0-44 9, adult ([facility] ) 2021    Muscle weakness (generalized) 2021    Platelets decreased ([facility] ) 2021    Medicare annual wellness visit, subsequent 2021    Chronic congestive heart failure ([facility] ) 2020    Leukocytosis 10/29/2020    Hyperlipidemia 10/29/2020    Nutritional anemia, unspecified  10/29/2020    Chest pain 10/11/2020    Simple chronic bronchitis ([facility] ) 10/11/2020    Atrial fibrillation 2020    Hyperglycemia 2020    Transition of care performed with sharing of clinical summary 2020    Obstructive sleep apnea 2020    Obesity (BMI 30-39 9) 2020    Stage 2 chronic kidney disease 2020    Hyponatremia 2020    COPD (chronic obstructive pulmonary disease) ([facility] ) 2020    Chronic a-fib ([facility] ) 2020    H/O vitamin D deficiency 10/28/2019    Dyslipidemia 2019    Type 2 diabetes mellitus with complication, with long-term current use of insulin ([facility] ) 2019    Restrictive ventilatory defect 2019    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 [facility]) 2019    Lung disease, restrictive 2018    Chronic respiratory failure with hypoxia ([facility] ) 10/31/2018    Coronary artery disease 09/06/2017    Esophageal reflux 09/06/2017    Back ache 11/30/2016    SHAVONNE and COPD overlap syndrome      Morbid obesity      Insulin-dependent diabetes mellitus with neuropathy 09/02/2016    Fatigue 09/02/2016    GERD 06/08/2016    Cough 01/13/2016    COPD with exacerbation ([place] [address] ) 01/13/2016    Psychiatric disorder     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/25/2015    Erectile dysfunction of organic origin 08/25/2014    Essential hypertension 09/04/2012    Diabetic neuropathy ([place] [address] ) 09/04/2012    Panic disorder without agoraphobia 09/04/2012    Vitamin D deficiency 09/04/2012      LOS (days): 3  Geometric Mean LOS (GMLOS) (days): 2 90  Days to GMLOS:0 2     OBJECTIVE:  Risk of Unplanned Readmission Score: 29         Current admission status: Inpatient   Preferred Pharmacy:   [address] [place] [address]  Phone: [phone] Fax: [phone]    Primary Care Provider: Barbie Sampson MD    Primary Insurance:   Secondary Insurance:     DISCHARGE DETAILS:                                                                                                               [address] [place] [address] Number: Med Director has intent to deny SNF auth request, offering peer to peer by 4/21/22 2PM EST to p# [phone]

## 2022-04-20 NOTE — PROGRESS NOTES
Phan 44  Progress Note Isa Kelly 1960, 57 y o  male MRN: [id number]  Unit/Bed#: [address] [place] [address] Encounter: [id number]  Primary Care Provider: Dutch Markham MD   Date and time admitted to hospital: 4/17/2022  2:20 PM      * Chronic pain syndrome  Assessment & Plan  Follows outpatient pain management  In the setting of degenerative disc disease - s/p recent epidural steroid injection  Admitted for progressive/worsening pain w/ associated ambulatory dysfunction  Continue ongoing PT/OT evaluation -> patient denied skilled rehab by insurance -> tentative plan for discharge home with home health services tomorrow with more optimal pain control    Atrial fibrillation  Assessment & Plan  Controlled on Digoxin/Toprol-XL  Continue Eliquis for anticoagulation    Morbid obesity   Assessment & Plan  BMI of 38 17  Lifestyle/diet modifications    Psychiatric disorder  Assessment & Plan  Continue Zoloft/Seroquel/Buspar/Xanax  Outpatient follow-up    Essential hypertension  Assessment & Plan  Low-sodium diet  Continue Aldactone/Cozaar/Toprol-XL    Coronary artery disease  Assessment & Plan  Continue ASA/Lipitor/Lopressor/Cozaar    GERD  Assessment & Plan  Continue PPI regimen    Chronic diastolic CHF  Assessment & Plan  Low-sodium diet w/ fluid restriction  Continue Digoxin/Lopressor  On diuresis w/ Demadex/Aldactone  Monitor replete serum potassium/magnesium deficiencies if present  Echocardiogram earlier this year with an EF of 55% with biatrial dilatation, mild VT, but no evidence of regional LV wall motion abnormalities    Insulin-dependent diabetes mellitus with neuropathy  Assessment & Plan  Lab Results   Component Value Date    HGBA1C 8 6 (H) 03/25/2022     Continue basal insulin w/ additional SSI coverage per Accu-Cheks  Carbohydrate restricted diet  On Gabapentin for neuropathy    SHAVONNE and COPD overlap syndrome   Assessment & Plan  Continue BIPAP QHS  C/w Breo-Ellipta and Albuterol nebulization  Encourage weight loss    Leukocytosis  Assessment & Plan  Likely reactive acute medical issue(s) and recent steroid injection  Monitor WBC count    Hyponatremia  Assessment & Plan  Chronic/stable issue in the setting of CHF  Monitor serum sodium      DVT Prophylaxis:  Eliquis       Patient Centered Rounds:  I have performed bedside rounds and discussed plan of care with nursing today  Discussions with Specialists or Other Care Team Provider:  see above assessments if applicable    Education and Discussions with Family / Patient: Patient at bedside    Time Spent for Care:  32 minutes  More than 50% of total time spent on counseling and coordination of care as described above  Current Length of Stay: 3 day(s)    Current Patient Status: Inpatient   Certification Statement:  Patient will continue to require additional hospital stay due to assessments as noted above  Code Status: Level 1 - Full Code        Subjective:     States pain is waxing/waning but mildly better controlled yesterday  Also notes his shortness of breath has improved after transitioning nebulization treatments to around the clock  Objective:     Vitals:   Temp (24hrs), Av 4 °F (36 9 °C), Min:97 7 °F (36 5 °C), Max:99 °F (37 2 °C)    Temp:  [97 7 °F (36 5 °C)-99 °F (37 2 °C)] 98 9 °F (37 2 °C)  HR:  [65-83] 70  Resp:  [16-22] 18  BP: (103-124)/(53-61) 110/53  SpO2:  [96 %-98 %] 97 %  Body mass index is 38 17 kg/m²       Input and Output Summary (last 24 hours):     No intake or output data in the 24 hours ending 22 1410    Physical Exam:     GENERAL:  Weak/fatigued - obese  HEAD:  Normocephalic - atraumatic  EYES: PERRL - EOMI   MOUTH:  Mucosa moist  NECK:  Supple - full range of motion  CARDIAC:  Regular rate/rhythm - S1/S2 positive  PULMONARY:  Diminished bibasilar breath sounds with decreased respiratory effort due to body habitus - nonlabored respirations at rest  ABDOMEN:  Soft - nontender/nondistended - active bowel sounds  MUSCULOSKELETAL:  Motor strength/range of motion deconditioned - bilateral paraspinal tenderness noted  NEUROLOGIC:  Alert/oriented at baseline  SKIN:  Chronic wrinkles/blemishes   PSYCHIATRIC:  Mood/affect stable      Additional Data:     Labs & Recent Cultures:    Results from last 7 days   Lab Units 04/20/22  0538   WBC Thousand/uL 11 14*   HEMOGLOBIN g/dL 12 4   HEMATOCRIT % 37 0   PLATELETS Thousands/uL 175   NEUTROS PCT % 36*   LYMPHS PCT % 58*   MONOS PCT % 5   EOS PCT % 1     Results from last 7 days   Lab Units 04/20/22  0538 04/17/22  1453 04/17/22  1453   POTASSIUM mmol/L 4 0   < > 4 8   CHLORIDE mmol/L 91*   < > 94*   CO2 mmol/L 27   < > 24   BUN mg/dL 17   < > 11   CREATININE mg/dL 0 97   < > 0 99   CALCIUM mg/dL 8 8   < > 8 9   ALK PHOS U/L  --   --  48   ALT U/L  --   --  40   AST U/L  --   --  19    < > = values in this interval not displayed           Results from last 7 days   Lab Units 04/20/22  1127 04/20/22  0711 04/19/22  2118 04/19/22  1641 04/19/22  1136 04/19/22  0757 04/18/22  2259 04/18/22  2042 04/18/22  1558 04/18/22  1129 04/18/22  0753 04/17/22  2033   POC GLUCOSE mg/dl 257* 156* 237* 127 132 95 187* 77 135 161* 127 183*                         Last 24 Hours Medication List:   Current Facility-Administered Medications   Medication Dose Route Frequency Provider Last Rate    acetaminophen  650 mg Oral Q6H PRN Agape L Lila, DO      albuterol  2 5 mg Nebulization 4x Daily Alysa Sutton MD      ALPRAZolam  2 mg Oral HS Agape JOANNE Sharp, DO      apixaban  5 mg Oral BID Jef Sharp, DO      aspirin  81 mg Oral Daily Agape JOANNE Sharp, DO      atorvastatin  80 mg Oral Daily Agape L Lila, DO      busPIRone  10 mg Oral BID Agape Mabeline Gear, DO      cholecalciferol  1,000 Units Oral Daily Agacat Sharp, DO      dextromethorphan-guaiFENesin  5 mL Oral Q8H PRN Agape Mabeline Gear, DO      Diclofenac Sodium  2 g Topical 4x Daily PRN Agape L Sharp, DO      digoxin  250 mcg Oral Daily Jef Barry Andrade, DO      fish oil  2,000 mg Oral Daily Jef Sharp, DO      fluticasone-vilanterol  1 puff Inhalation Daily Jef Sharp, DO      gabapentin  300 mg Oral TID Jef Sharp, DO      insulin glargine  75 Units Subcutaneous Q12H [facility] Jef Sharp, DO      insulin lispro  2-12 Units Subcutaneous 4x Daily (AC & HS) Ashley Lawrence MD      insulin lispro  35 Units Subcutaneous TID With Meals Jef Andrade, DO      ketorolac  15 mg Intravenous Q6H PRN Ashley Lawrence MD      ketorolac  30 mg Intravenous Q6H PRN Ashley Lawrence, MD      lidocaine   Topical Q6H Ashley Lawrence, MD      losartan  50 mg Oral Daily Jef Sharp, DO      metoprolol succinate  200 mg Oral Daily Jef Sharp, DO      pantoprazole  20 mg Oral Early Morning Jef Sharp, DO      potassium chloride  20 mEq Oral Daily Jef Sharp, DO      QUEtiapine  100 mg Oral QAM Jef Sharp, DO      QUEtiapine  300 mg Oral HS Jef Sharp, DO      sertraline  50 mg Oral Daily Jef Sharp, DO      spironolactone  25 mg Oral Daily Jef Sharp, DO      torsemide  40 mg Oral Daily Jef Andrade, DO                      ** Please Note: This note is constructed using a voice recognition dictation system  An occasional wrong word/phrase or sound-a-like substitution may have been picked up by dictation device due to the inherent limitations of voice recognition software  Read the chart carefully and recognize, using reasonable context, where substitutions may have occurred  **

## 2022-04-20 NOTE — ASSESSMENT & PLAN NOTE
Low-sodium diet w/ fluid restriction  Continue Digoxin/Lopressor  On diuresis w/ Demadex/Aldactone  Monitor replete serum potassium/magnesium deficiencies if present  Echocardiogram earlier this year with an EF of 55% with biatrial dilatation, mild OK, but no evidence of regional LV wall motion abnormalities

## 2022-04-21 VITALS
SYSTOLIC BLOOD PRESSURE: 139 MMHG | DIASTOLIC BLOOD PRESSURE: 73 MMHG | HEART RATE: 57 BPM | RESPIRATION RATE: 19 BRPM | WEIGHT: 273.7 LBS | TEMPERATURE: 98.5 F | OXYGEN SATURATION: 95 % | HEIGHT: 71 IN | BODY MASS INDEX: 38.32 KG/M2

## 2022-04-21 DIAGNOSIS — Z79.4 TYPE 2 DIABETES MELLITUS WITH COMPLICATION, WITH LONG-TERM CURRENT USE OF INSULIN (HCC): ICD-10-CM

## 2022-04-21 DIAGNOSIS — E11.8 TYPE 2 DIABETES MELLITUS WITH COMPLICATION, WITH LONG-TERM CURRENT USE OF INSULIN (HCC): ICD-10-CM

## 2022-04-21 LAB
ANION GAP SERPL CALCULATED.3IONS-SCNC: 9 MMOL/L (ref 4–13)
BASOPHILS # BLD AUTO: 0.02 THOUSANDS/ΜL (ref 0–0.1)
BASOPHILS NFR BLD AUTO: 0 % (ref 0–1)
BUN SERPL-MCNC: 16 MG/DL (ref 5–25)
CALCIUM SERPL-MCNC: 9.2 MG/DL (ref 8.3–10.1)
CHLORIDE SERPL-SCNC: 91 MMOL/L (ref 100–108)
CO2 SERPL-SCNC: 27 MMOL/L (ref 21–32)
CREAT SERPL-MCNC: 0.85 MG/DL (ref 0.6–1.3)
EOSINOPHIL # BLD AUTO: 0.12 THOUSAND/ΜL (ref 0–0.61)
EOSINOPHIL NFR BLD AUTO: 1 % (ref 0–6)
ERYTHROCYTE [DISTWIDTH] IN BLOOD BY AUTOMATED COUNT: 13.1 % (ref 11.6–15.1)
GFR SERPL CREATININE-BSD FRML MDRD: 93 ML/MIN/1.73SQ M
GLUCOSE SERPL-MCNC: 100 MG/DL (ref 65–140)
GLUCOSE SERPL-MCNC: 106 MG/DL (ref 65–140)
GLUCOSE SERPL-MCNC: 127 MG/DL (ref 65–140)
GLUCOSE SERPL-MCNC: 161 MG/DL (ref 65–140)
GLUCOSE SERPL-MCNC: 224 MG/DL (ref 65–140)
HCT VFR BLD AUTO: 36.6 % (ref 36.5–49.3)
HGB BLD-MCNC: 12.7 G/DL (ref 12–17)
IMM GRANULOCYTES # BLD AUTO: 0.04 THOUSAND/UL (ref 0–0.2)
IMM GRANULOCYTES NFR BLD AUTO: 0 % (ref 0–2)
LYMPHOCYTES # BLD AUTO: 5.85 THOUSANDS/ΜL (ref 0.6–4.47)
LYMPHOCYTES NFR BLD AUTO: 57 % (ref 14–44)
MAGNESIUM SERPL-MCNC: 1.8 MG/DL (ref 1.6–2.6)
MCH RBC QN AUTO: 31.3 PG (ref 26.8–34.3)
MCHC RBC AUTO-ENTMCNC: 34.7 G/DL (ref 31.4–37.4)
MCV RBC AUTO: 90 FL (ref 82–98)
MONOCYTES # BLD AUTO: 0.52 THOUSAND/ΜL (ref 0.17–1.22)
MONOCYTES NFR BLD AUTO: 5 % (ref 4–12)
NEUTROPHILS # BLD AUTO: 3.89 THOUSANDS/ΜL (ref 1.85–7.62)
NEUTS SEG NFR BLD AUTO: 37 % (ref 43–75)
NRBC BLD AUTO-RTO: 0 /100 WBCS
PLATELET # BLD AUTO: 169 THOUSANDS/UL (ref 149–390)
PMV BLD AUTO: 9.2 FL (ref 8.9–12.7)
POTASSIUM SERPL-SCNC: 4.1 MMOL/L (ref 3.5–5.3)
RBC # BLD AUTO: 4.06 MILLION/UL (ref 3.88–5.62)
SODIUM SERPL-SCNC: 127 MMOL/L (ref 136–145)
WBC # BLD AUTO: 10.44 THOUSAND/UL (ref 4.31–10.16)

## 2022-04-21 PROCEDURE — 85025 COMPLETE CBC W/AUTO DIFF WBC: CPT | Performed by: INTERNAL MEDICINE

## 2022-04-21 PROCEDURE — 94640 AIRWAY INHALATION TREATMENT: CPT

## 2022-04-21 PROCEDURE — 94760 N-INVAS EAR/PLS OXIMETRY 1: CPT

## 2022-04-21 PROCEDURE — 99239 HOSP IP/OBS DSCHRG MGMT >30: CPT | Performed by: INTERNAL MEDICINE

## 2022-04-21 PROCEDURE — 97530 THERAPEUTIC ACTIVITIES: CPT

## 2022-04-21 PROCEDURE — 80048 BASIC METABOLIC PNL TOTAL CA: CPT | Performed by: INTERNAL MEDICINE

## 2022-04-21 PROCEDURE — 82948 REAGENT STRIP/BLOOD GLUCOSE: CPT

## 2022-04-21 PROCEDURE — 83735 ASSAY OF MAGNESIUM: CPT | Performed by: INTERNAL MEDICINE

## 2022-04-21 RX ORDER — KETOROLAC TROMETHAMINE 10 MG/1
10 TABLET, FILM COATED ORAL EVERY 6 HOURS PRN
Refills: 0
Start: 2022-04-21 | End: 2022-04-28 | Stop reason: HOSPADM

## 2022-04-21 RX ORDER — GUAIFENESIN AND CODEINE PHOSPHATE 100; 10 MG/5ML; MG/5ML
5 SOLUTION ORAL 3 TIMES DAILY PRN
Qty: 120 ML | Refills: 0 | Status: SHIPPED | OUTPATIENT
Start: 2022-04-21 | End: 2022-05-01

## 2022-04-21 RX ORDER — LIRAGLUTIDE 6 MG/ML
INJECTION SUBCUTANEOUS
Qty: 9 ML | Refills: 1 | Status: SHIPPED | OUTPATIENT
Start: 2022-04-21 | End: 2022-07-06

## 2022-04-21 RX ORDER — ALBUTEROL SULFATE 2.5 MG/3ML
2.5 SOLUTION RESPIRATORY (INHALATION) 3 TIMES DAILY
Status: DISCONTINUED | OUTPATIENT
Start: 2022-04-21 | End: 2022-04-21 | Stop reason: HOSPADM

## 2022-04-21 RX ORDER — CHLORAL HYDRATE 500 MG
2000 CAPSULE ORAL DAILY
Refills: 0
Start: 2022-04-22

## 2022-04-21 RX ORDER — ALPRAZOLAM 2 MG/1
2 TABLET ORAL
Qty: 5 TABLET | Refills: 0 | Status: SHIPPED | OUTPATIENT
Start: 2022-04-21

## 2022-04-21 RX ORDER — MAGNESIUM SULFATE HEPTAHYDRATE 40 MG/ML
2 INJECTION, SOLUTION INTRAVENOUS ONCE
Status: COMPLETED | OUTPATIENT
Start: 2022-04-21 | End: 2022-04-21

## 2022-04-21 RX ADMIN — ASPIRIN 81 MG: 81 TABLET, COATED ORAL at 10:04

## 2022-04-21 RX ADMIN — BUSPIRONE HYDROCHLORIDE 10 MG: 10 TABLET ORAL at 10:04

## 2022-04-21 RX ADMIN — ATORVASTATIN CALCIUM 80 MG: 80 TABLET, FILM COATED ORAL at 10:04

## 2022-04-21 RX ADMIN — KETOROLAC TROMETHAMINE 30 MG: 30 INJECTION, SOLUTION INTRAMUSCULAR at 00:15

## 2022-04-21 RX ADMIN — INSULIN GLARGINE 75 UNITS: 100 INJECTION, SOLUTION SUBCUTANEOUS at 10:11

## 2022-04-21 RX ADMIN — DIGOXIN 250 MCG: 125 TABLET ORAL at 10:04

## 2022-04-21 RX ADMIN — INSULIN LISPRO 35 UNITS: 100 INJECTION, SOLUTION INTRAVENOUS; SUBCUTANEOUS at 12:51

## 2022-04-21 RX ADMIN — ALBUTEROL SULFATE 2.5 MG: 2.5 SOLUTION RESPIRATORY (INHALATION) at 07:42

## 2022-04-21 RX ADMIN — TORSEMIDE 40 MG: 20 TABLET ORAL at 10:04

## 2022-04-21 RX ADMIN — GABAPENTIN 300 MG: 300 CAPSULE ORAL at 15:31

## 2022-04-21 RX ADMIN — FLUTICASONE FUROATE AND VILANTEROL TRIFENATATE 1 PUFF: 100; 25 POWDER RESPIRATORY (INHALATION) at 10:03

## 2022-04-21 RX ADMIN — QUETIAPINE FUMARATE 100 MG: 50 TABLET, EXTENDED RELEASE ORAL at 10:02

## 2022-04-21 RX ADMIN — APIXABAN 5 MG: 5 TABLET, FILM COATED ORAL at 10:04

## 2022-04-21 RX ADMIN — LOSARTAN POTASSIUM 50 MG: 50 TABLET, FILM COATED ORAL at 10:04

## 2022-04-21 RX ADMIN — SPIRONOLACTONE 25 MG: 25 TABLET ORAL at 10:04

## 2022-04-21 RX ADMIN — POTASSIUM CHLORIDE 20 MEQ: 20 TABLET, EXTENDED RELEASE ORAL at 10:04

## 2022-04-21 RX ADMIN — DICLOFENAC SODIUM TOPICAL GEL, 1%, 2 G: 10 GEL TOPICAL at 16:24

## 2022-04-21 RX ADMIN — OMEGA-3 FATTY ACIDS CAP 1000 MG 2000 MG: 1000 CAP at 10:04

## 2022-04-21 RX ADMIN — INSULIN LISPRO 35 UNITS: 100 INJECTION, SOLUTION INTRAVENOUS; SUBCUTANEOUS at 16:22

## 2022-04-21 RX ADMIN — INSULIN LISPRO 35 UNITS: 100 INJECTION, SOLUTION INTRAVENOUS; SUBCUTANEOUS at 07:37

## 2022-04-21 RX ADMIN — SERTRALINE HYDROCHLORIDE 50 MG: 50 TABLET ORAL at 10:04

## 2022-04-21 RX ADMIN — LIDOCAINE: 40 CREAM TOPICAL at 06:06

## 2022-04-21 RX ADMIN — MAGNESIUM SULFATE HEPTAHYDRATE 2 G: 40 INJECTION, SOLUTION INTRAVENOUS at 10:13

## 2022-04-21 RX ADMIN — KETOROLAC TROMETHAMINE 30 MG: 30 INJECTION, SOLUTION INTRAMUSCULAR at 15:31

## 2022-04-21 RX ADMIN — ALBUTEROL SULFATE 2.5 MG: 2.5 SOLUTION RESPIRATORY (INHALATION) at 13:24

## 2022-04-21 RX ADMIN — PANTOPRAZOLE SODIUM 20 MG: 20 TABLET, DELAYED RELEASE ORAL at 06:05

## 2022-04-21 RX ADMIN — METOPROLOL SUCCINATE 200 MG: 100 TABLET, EXTENDED RELEASE ORAL at 10:04

## 2022-04-21 RX ADMIN — KETOROLAC TROMETHAMINE 30 MG: 30 INJECTION, SOLUTION INTRAMUSCULAR at 07:37

## 2022-04-21 RX ADMIN — INSULIN LISPRO 4 UNITS: 100 INJECTION, SOLUTION INTRAVENOUS; SUBCUTANEOUS at 16:22

## 2022-04-21 RX ADMIN — DICLOFENAC SODIUM TOPICAL GEL, 1%, 2 G: 10 GEL TOPICAL at 10:03

## 2022-04-21 RX ADMIN — Medication 1000 UNITS: at 10:04

## 2022-04-21 RX ADMIN — GABAPENTIN 300 MG: 300 CAPSULE ORAL at 10:04

## 2022-04-21 NOTE — DISCHARGE SUMMARY
Discharge Summary - [facility]  Patient: Hailey Schneider 57 y o  male   MRN: [id number]  PCP: Nasir Mccallum MD  Unit/Bed#: [facility] Encounter: [id number]            Discharging Physician / Practitioner: Hafsa Wu MD  PCP: Nasir Mccallum MD  Admission Date:   Admission Orders (From admission, onward)     Ordered        04/17/22 1716  Inpatient Admission  Once                      Discharge Date: 04/21/22      Reason for Admission:  Back pain and ambulatory dysfunction        Discharge Diagnoses:     Principal Problem:    Chronic pain syndrome    Active Problems:    Atrial fibrillation    Morbid obesity     Psychiatric disorder    Essential hypertension    Coronary artery disease    GERD    Chronic diastolic CHF    Insulin-dependent diabetes mellitus with neuropathy    SHAVONNE and COPD overlap syndrome     Leukocytosis (improved)    Chronic hyponatremia      Consultations During Hospital Stay:  · None      Hospital Course:     * Chronic pain syndrome  Assessment & Plan  Follows outpatient pain management  In the setting of degenerative disc disease - s/p recent epidural steroid injection  Admitted for progressive/worsening pain w/ associated ambulatory dysfunction  Continue ongoing PT/OT evaluation -> patient denied skilled rehab by insurance -> plan for discharge home with home health services today     Atrial fibrillation  Assessment & Plan  Controlled on Digoxin/Toprol-XL  Continue Eliquis for anticoagulation     Morbid obesity   Assessment & Plan  BMI of 38 17  Lifestyle/diet modifications     Psychiatric disorder  Assessment & Plan  Continue Zoloft/Seroquel/Buspar/Xanax  Outpatient follow-up     Essential hypertension  Assessment & Plan  Low-sodium diet  Continue Aldactone/Cozaar/Toprol-XL     Coronary artery disease  Assessment & Plan  Continue ASA/Lipitor/Lopressor/Cozaar     GERD  Assessment & Plan  Continue PPI regimen     Chronic diastolic CHF  Assessment & Plan  Low-sodium diet w/ fluid restriction  Continue Digoxin/Lopressor  On diuresis w/ Demadex/Aldactone  Monitor replete serum potassium/magnesium deficiencies if present  Echocardiogram earlier this year with an EF of 55% with biatrial dilatation, mild AK, but no evidence of regional LV wall motion abnormalities     Insulin-dependent diabetes mellitus with neuropathy  Assessment & Plan        Lab Results   Component Value Date     HGBA1C 8 6 (H) 03/25/2022      Maintained on basal insulin w/ additional SSI coverage per Accu-Cheks while hospitalized - resume home regimen on discharge   Carbohydrate restricted diet  On Gabapentin for neuropathy     SHAVONNE and COPD overlap syndrome   Assessment & Plan  Continue BIPAP QHS  Resume home Trelegy on discharge with nebulization treatments  Encouraged weight loss     Leukocytosis  Assessment & Plan  Likely reactive acute medical issue(s) and recent steroid injection  Stable/improved     Hyponatremia  Assessment & Plan  Chronic/stable issue in the setting of CHF      Condition at Discharge: fair       Discharge Day Visit / Exam:     Vitals: Blood Pressure: 129/78 (04/21/22 1007)  Pulse: 73 (04/21/22 1007)  Temperature: 97 8 °F (36 6 °C) (04/21/22 0735)  Temp Source: Oral (04/20/22 1930)  Respirations: 18 (04/21/22 1007)  Height: 5' 11" (180 3 cm) (04/17/22 1932)  Weight - Scale: 124 kg (273 lb 11 2 oz) (04/17/22 1932)  SpO2: 94 % (04/21/22 1007)      Physical exam - I had a face-to-face encounter with the patient on day of discharge  Discussion with Patient and/or Family:  The patient has been advised to return to the ER immediately if any symptoms recur or worsen  Discharge instructions/Information to Patient and/or Family:   See after visit summary for information provided to patient and/or family  Provisions for Follow-Up Care:  See after visit summary for information related to follow-up care and any pertinent home health orders          Disposition:   Home with home health services      Discharge Medications:  See after visit summary for reconciled discharge medications provided to patient and/or family  Discharge Statement:  I spent 38 minutes discharging the patient  This time was spent on the day of discharge  I had direct contact with the patient on the day of discharge  Greater than 50% of the total time was spent examining patient, answering all patient questions, arranging and discussing plan of care with patient as well as directly providing post-discharge instructions  Additional time then spent on discharge activities  ** Please Note: This note is constructed using a voice recognition dictation system  An occasional wrong word/phrase or sound-a-like substitution may have been picked up by dictation device due to the inherent limitations of voice recognition software  Read the chart carefully and recognize, using reasonable context, where substitutions may have occurred  **

## 2022-04-21 NOTE — PLAN OF CARE
LC 2        Subjective   Patt is a 39 year old who presents for the following health issues ER follow up, had dyspepsia attack after eating out     HPI   Current Outpatient Medications   Medication     Multiple Vitamins-Minerals (HAIR SKIN NAILS PO)     VITAMIN D, CHOLECALCIFEROL, PO     No current facility-administered medications for this visit.          Hospital Follow-up Visit:    Hospital/Nursing Home/IP Rehab Facility: [facility]  Problem: MOBILITY - ADULT  Goal: Maintain or return to baseline ADL function  Description: INTERVENTIONS:  -  Assess patient's ability to carry out ADLs; assess patient's baseline for ADL function and identify physical deficits which impact ability to perform ADLs (bathing, care of mouth/teeth, toileting, grooming, dressing, etc )  - Assess/evaluate cause of self-care deficits   - Assess range of motion  - Assess patient's mobility; develop plan if impaired  - Assess patient's need for assistive devices and provide as appropriate  - Encourage maximum independence but intervene and supervise when necessary  - Involve family in performance of ADLs  - Assess for home care needs following discharge   - Consider OT consult to assist with ADL evaluation and planning for discharge  - Provide patient education as appropriate  Outcome: Progressing  Goal: Maintains/Returns to pre admission functional level  Description: INTERVENTIONS:  - Perform BMAT or MOVE assessment daily    - Set and communicate daily mobility goal to care team and patient/family/caregiver  - Collaborate with rehabilitation services on mobility goals if consulted  - Perform Range of Motion 3 times a day  - Reposition patient every 2 hours    - Dangle patient 3 times a day  - Stand patient 3 times a day  - Ambulate patient 3 times a day  - Out of bed to chair 3 times a day   - Out of bed for meals 3 times a day  - Out of bed for toileting  - Record patient progress and toleration of activity level   Outcome: Progressing     Problem: Potential for Falls  Goal: Patient will remain free of falls  Description: INTERVENTIONS:  - Educate patient/family on patient safety including physical limitations  - Instruct patient to call for assistance with activity   - Consult OT/PT to assist with strengthening/mobility   - Keep Call bell within reach  - Keep bed low and locked with side rails adjusted as appropriate  - Keep care items and personal belongings within " reach  - Initiate and maintain comfort rounds  - Make Fall Risk Sign visible to staff  - Offer Toileting every 2 Hours, in advance of need  - Initiate/Maintain bed alarm  - Obtain necessary fall risk management equipment: Cane  - Apply yellow socks and bracelet for high fall risk patients  - Consider moving patient to room near nurses station  Outcome: Progressing     Problem: PAIN - ADULT  Goal: Verbalizes/displays adequate comfort level or baseline comfort level  Description: Interventions:  - Encourage patient to monitor pain and request assistance  - Assess pain using appropriate pain scale  - Administer analgesics based on type and severity of pain and evaluate response  - Implement non-pharmacological measures as appropriate and evaluate response  - Consider cultural and social influences on pain and pain management  - Notify physician/advanced practitioner if interventions unsuccessful or patient reports new pain  Outcome: Progressing     Problem: RESPIRATORY - ADULT  Goal: Achieves optimal ventilation and oxygenation  Description: INTERVENTIONS:  - Assess for changes in respiratory status  - Assess for changes in mentation and behavior  - Position to facilitate oxygenation and minimize respiratory effort  - Oxygen administered by appropriate delivery if ordered  - Initiate smoking cessation education as indicated  - Encourage broncho-pulmonary hygiene including cough, deep breathe, Incentive Spirometry  - Assess the need for suctioning and aspirate as needed  - Assess and instruct to report SOB or any respiratory difficulty  - Respiratory Therapy support as indicated  Outcome: Progressing     Problem: Prexisting or High Potential for Compromised Skin Integrity  Goal: Skin integrity is maintained or improved  Description: INTERVENTIONS:  - Identify patients at risk for skin breakdown  - Assess and monitor skin integrity  - Assess and monitor nutrition and hydration status  - Monitor labs   - Assess for incontinence   - Turn and reposition patient  - Assist with mobility/ambulation  - Relieve pressure over bony prominences  - Avoid friction and shearing  - Provide appropriate hygiene as needed including keeping skin clean and dry  - Evaluate need for skin moisturizer/barrier cream  - Collaborate with interdisciplinary team   - Patient/family teaching  - Consider wound care consult   Outcome: Progressing

## 2022-04-21 NOTE — PHYSICAL THERAPY NOTE
PT TREATMENT     04/21/22 1125   PT Last Visit   PT Visit Date 04/21/22   Note Type   Note Type Treatment   Pain Assessment   Pain Assessment Tool Mno-Baker FACES   Mon-Baker FACES Pain Rating 4   Pain Location/Orientation Orientation: Lower; Location: Back  (RN aware and handling accordingly)   Restrictions/Precautions   Weight Bearing Precautions Per Order No   Other Precautions Chair Alarm; Bed Alarm;O2;Fall Risk   General   Chart Reviewed Yes   Family/Caregiver Present No   Cognition   Overall Cognitive Status WFL   Arousal/Participation Cooperative   Attention Within functional limits   Following Commands Follows all commands and directions without difficulty   Subjective   Subjective "It feels good to sit in the chair"   Bed Mobility   Rolling R 7  Independent   Rolling L 7  Independent   Supine to Sit 7  Independent   Additional Comments to chair   Transfers   Sit to Stand 5  Supervision   Stand to Sit 5  Supervision   Stand pivot 5  Supervision   Toilet transfer 5  Supervision   Ambulation/Elevation   Gait pattern Wide KASSIE  (slow christina)   Gait Assistance 5  Supervision   Assistive Device Rolling walker; O2 2L via NC   Distance 15 feet x 2   Ambulation/Elevation Additional Comments recommend use of RW vs his straight cane   Balance   Ambulatory Fair  (with RW)   Activity Tolerance   Activity Tolerance Patient limited by fatigue   Nurse Made Aware yes: Hector   Assessment   Prognosis Good   Problem List Decreased strength;Decreased endurance; Impaired balance;Decreased mobility;Obesity; Decreased skin integrity; Decreased safety awareness   Assessment Pt transfers as stated above with all functional mobility either independently or with supervision  Pt is limited by generalized weakness and chronic LBP  Pt also reports that he uses his straight cane as his primary assistive device  This PT recommended that he use his rollator at all times which would give him more support and better balance    Pt verbalized understanding and agrees  PT also discussed D/C options and agrees that pt would be able to return home as far as his function abilities with use of the walker and would like home PT/OT and HHA   Pt can progress his strength and balance as needed at home in his own environment  Pt in agreement   made aware  The patient's AM-PAC Basic Mobility Inpatient Short Form Raw Score is 19  A Raw score of greater than 16 suggests the patient may benefit from discharge to home with Home PT/OT and HHA  Please also refer to the recommendation of the Physical Therapist for safe discharge planning  Goals   Patient Goals to get a HHA   Plan   Treatment/Interventions ADL retraining;Functional transfer training;LE strengthening/ROM; Therapeutic exercise; Endurance training;Patient/family training;Equipment eval/education; Bed mobility;Gait training;Spoke to nursing;Spoke to case management;OT   Progress Progressing toward goals   Recommendation   PT Discharge Recommendation Home with home health rehabilitation   [address]   Turning in Bed Without Bedrails 4   Lying on Back to Sitting on Edge of Flat Bed 4   Moving Bed to Chair 3   Standing Up From Chair 4   Walk in Room 3   Climb 3-5 Stairs 1   Basic Mobility Inpatient Raw Score 19   Basic Mobility Standardized Score 42 48   Highest Level Of Mobility   JH-HLM Goal 6: Walk 10 steps or more   JH-HLM Highest Level of Mobility 6: Walk 10 steps or more   JH-HLM Goal Achieved Yes   Education   Education Provided Other  (d/c options)   Patient Demonstrates acceptance/verbal understanding;Explanation/teachback used;Demonstrates verbal understanding   End of Consult   Patient Position at End of Consult Bedside chair; All needs within reach   Licensure   [address] Number  yung SNYDER  [facility] PT  [id number]

## 2022-04-21 NOTE — CASE MANAGEMENT
Case Management Progress Note    Patient name Emile Negrete  [address] MRN [id number]  : 1960 Date 2022       LOS (days): 4  Geometric Mean LOS (GMLOS) (days): 2 90  Days to GMLOS:-1        OBJECTIVE:  Current admission status: Inpatient  Preferred Pharmacy:   [address] [place] [id number]  Phone: [phone] Fax: [phone]    Primary Care Provider: Sherry Ramirez MD  Primary Insurance: Purnima PRESCOTT  Secondary Insurance:     PROGRESS NOTE:    KENDAL spoke with Kaylen Zeng with [facility] and she states transport with [facility] was requested and they have a 3 hour window to secure transport time  KENDAL requested she call patients nurse Dilcia Rodriguez at [phone] with transport time  CM spoke with Dilcia Rodriguez and made him aware of the above  He states understanding

## 2022-04-21 NOTE — CASE MANAGEMENT
Case Management Discharge Planning Note    Patient name Navjot Islas  Location [address] MRN [id number]  : 1960 Date 2022       Current Admission Date: 2022  Current Admission Diagnosis:Chronic pain syndrome   Patient Active Problem List    Diagnosis Date Noted    Chronic diastolic CHF     Chronic pain syndrome 2022    Foraminal stenosis of lumbar region 2022    Lumbar post-laminectomy syndrome 2022    Lumbar radiculopathy 2022    Shortness of breath 2022    SIRS (systemic inflammatory response syndrome) ([facility] [address] [facility] ) 2022    Dysuria 2021    Peripheral neuropathy 2021    Chronic left-sided low back pain with left-sided sciatica 2021    BMI 40 0-44 9, adult ([facility] ) 2021    Muscle weakness (generalized) 2021    Platelets decreased ([facility] ) 2021    Medicare annual wellness visit, subsequent 2021    Chronic congestive heart failure ([facility] ) 2020    Leukocytosis 10/29/2020    Hyperlipidemia 10/29/2020    Nutritional anemia, unspecified  10/29/2020    Chest pain 10/11/2020    Simple chronic bronchitis ([facility] ) 10/11/2020    Atrial fibrillation 2020    Hyperglycemia 2020    Transition of care performed with sharing of clinical summary 2020    Obstructive sleep apnea 2020    Obesity (BMI 30-39 9) 2020    Stage 2 chronic kidney disease 2020    Hyponatremia 2020    COPD (chronic obstructive pulmonary disease) ([facility] ) 2020    Chronic a-fib ([facility] ) 2020    H/O vitamin D deficiency 10/28/2019    Dyslipidemia 2019    Type 2 diabetes mellitus with complication, with long-term current use of insulin ([facility] ) 2019    Restrictive ventilatory defect 2019    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 [facility]) 2019    Lung disease, restrictive 2018    Chronic respiratory failure with hypoxia ([facility] ) 10/31/2018    Coronary artery disease 09/06/2017    Esophageal reflux 09/06/2017    Back ache 11/30/2016    SHAVONNE and COPD overlap syndrome      Morbid obesity      Insulin-dependent diabetes mellitus with neuropathy 09/02/2016    Fatigue 09/02/2016    GERD 06/08/2016    Cough 01/13/2016    COPD with exacerbation ([facility] ) 01/13/2016    Psychiatric disorder     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/25/2015    Erectile dysfunction of organic origin 08/25/2014    Essential hypertension 09/04/2012    Diabetic neuropathy ([place] [address] ) 09/04/2012    Panic disorder without agoraphobia 09/04/2012    Vitamin D deficiency 09/04/2012      LOS (days): 4  Geometric Mean LOS (GMLOS) (days): 2 90  Days to GMLOS:-1     OBJECTIVE:  Risk of Unplanned Readmission Score: 28   Current admission status: Inpatient   Preferred Pharmacy:   [address] [place] [address]  Phone: [phone] Fax: [phone]    Primary Care Provider: Juanjo Stoddard MD    Primary Insurance: [facility] REP  Secondary Insurance:     DISCHARGE DETAILS:    Patient was accepted under services with [facility]  Patient provided his now phone number [phone] and this was forwarded to [address] [place] [address] in [place]  Patient is aware he will be followed by [facility]  Importance of f/u with his OPCM was reinforced  Transport time is still pending

## 2022-04-21 NOTE — CASE MANAGEMENT
Case Management Discharge Planning Note    Patient name Eli Drummond  Location 2 [place] 209/2 [place] 80 MRN [id number]  : 1960 Date 2022       Current Admission Date: 2022  Current Admission Diagnosis:Chronic pain syndrome   Patient Active Problem List    Diagnosis Date Noted    Chronic diastolic CHF     Chronic pain syndrome 2022    Foraminal stenosis of lumbar region 2022    Lumbar post-laminectomy syndrome 2022    Lumbar radiculopathy 2022    Shortness of breath 2022    SIRS (systemic inflammatory response syndrome) ([facility] ) 2022    Dysuria 2021    Peripheral neuropathy 2021    Chronic left-sided low back pain with left-sided sciatica 2021    BMI 40 0-44 9, adult ([address] ) 2021    Muscle weakness (generalized) 2021    Platelets decreased ([facility] ) 2021    Medicare annual wellness visit, subsequent 2021    Chronic congestive heart failure ([facility] ) 2020    Leukocytosis 10/29/2020    Hyperlipidemia 10/29/2020    Nutritional anemia, unspecified  10/29/2020    Chest pain 10/11/2020    Simple chronic bronchitis ([facility] ) 10/11/2020    Atrial fibrillation 2020    Hyperglycemia 2020    Transition of care performed with sharing of clinical summary 2020    Obstructive sleep apnea 2020    Obesity (BMI 30-39 9) 2020    Stage 2 chronic kidney disease 2020    Hyponatremia 2020    COPD (chronic obstructive pulmonary disease) ([facility] ) 2020    Chronic a-fib ([facility] ) 2020    H/O vitamin D deficiency 10/28/2019    Dyslipidemia 2019    Type 2 diabetes mellitus with complication, with long-term current use of insulin ([facility] ) 2019    Restrictive ventilatory defect 2019    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 [facility]) 2019    Lung disease, restrictive 2018    Chronic respiratory failure with hypoxia ([facility] ) 10/31/2018    Coronary artery disease 09/06/2017    Esophageal reflux 09/06/2017    Back ache 11/30/2016    SHAVONNE and COPD overlap syndrome      Morbid obesity      Insulin-dependent diabetes mellitus with neuropathy 09/02/2016    Fatigue 09/02/2016    GERD 06/08/2016    Cough 01/13/2016    COPD with exacerbation ([place] [address] ) 01/13/2016    Psychiatric disorder     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/25/2015    Erectile dysfunction of organic origin 08/25/2014    Essential hypertension 09/04/2012    Diabetic neuropathy ([place] [address] ) 09/04/2012    Panic disorder without agoraphobia 09/04/2012    Vitamin D deficiency 09/04/2012      LOS (days): 4  Geometric Mean LOS (GMLOS) (days): 2 90  Days to GMLOS:-1     OBJECTIVE:  Risk of Unplanned Readmission Score: 28         Current admission status: Inpatient   Preferred Pharmacy:   [address] [place] [address]  Phone: [phone] Fax: [phone]    Primary Care Provider: Garrison Cantor MD    Primary Insurance: [facility] REP  Secondary Insurance:     [facility] Number: SNF auth denied by Med Director, appeal [phone]# [phone] f# [phone]

## 2022-04-21 NOTE — CASE MANAGEMENT
Case Management Discharge Planning Note    Patient name Elaina Bauman  Location [address] MRN [id number]  : 1960 Date 2022       Current Admission Date: 2022  Current Admission Diagnosis:Chronic pain syndrome   Patient Active Problem List    Diagnosis Date Noted    Chronic diastolic CHF     Chronic pain syndrome 2022    Foraminal stenosis of lumbar region 2022    Lumbar post-laminectomy syndrome 2022    Lumbar radiculopathy 2022    Shortness of breath 2022    SIRS (systemic inflammatory response syndrome) ([facility] ) 2022    Dysuria 2021    Peripheral neuropathy 2021    Chronic left-sided low back pain with left-sided sciatica 2021    BMI 40 0-44 9, adult ([address] ) 2021    Muscle weakness (generalized) 2021    Platelets decreased ([facility] ) 2021    Medicare annual wellness visit, subsequent 2021    Chronic congestive heart failure ([facility] ) 2020    Leukocytosis 10/29/2020    Hyperlipidemia 10/29/2020    Nutritional anemia, unspecified  10/29/2020    Chest pain 10/11/2020    Simple chronic bronchitis ([facility] ) 10/11/2020    Atrial fibrillation 2020    Hyperglycemia 2020    Transition of care performed with sharing of clinical summary 2020    Obstructive sleep apnea 2020    Obesity (BMI 30-39 9) 2020    Stage 2 chronic kidney disease 2020    Hyponatremia 2020    COPD (chronic obstructive pulmonary disease) ([facility] ) 2020    Chronic a-fib ([facility] ) 2020    H/O vitamin D deficiency 10/28/2019    Dyslipidemia 2019    Type 2 diabetes mellitus with complication, with long-term current use of insulin ([facility] ) 2019    Restrictive ventilatory defect 2019    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 [facility]) 2019    Lung disease, restrictive 2018    Chronic respiratory failure with hypoxia ([facility] ) 10/31/2018    Coronary artery disease 09/06/2017    Esophageal reflux 09/06/2017    Back ache 11/30/2016    SHAVONNE and COPD overlap syndrome      Morbid obesity      Insulin-dependent diabetes mellitus with neuropathy 09/02/2016    Fatigue 09/02/2016    GERD 06/08/2016    Cough 01/13/2016    COPD with exacerbation ([facility] ) 01/13/2016    Psychiatric disorder     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/25/2015    Erectile dysfunction of organic origin 08/25/2014    Essential hypertension 09/04/2012    Diabetic neuropathy ([facility] ) 09/04/2012    Panic disorder without agoraphobia 09/04/2012    Vitamin D deficiency 09/04/2012      LOS (days): 4  Geometric Mean LOS (GMLOS) (days): 2 90  Days to GMLOS:-0 8     OBJECTIVE:  Risk of Unplanned Readmission Score: 27   Current admission status: Inpatient   Preferred Pharmacy:   [address] [place], [place] [id number]  Phone: [phone] Fax: [phone]    Primary Care Provider: Sherry Ramirez MD  Primary Insurance: [facility] REP  Secondary Insurance:     DISCHARGE DETAILS:  Discharge planning discussed with[de-identified] Patient  Freedom of Choice: Yes     Comments - Freedom of Choice: Patient is written for discharge and he is aware  Patient states he needs STR  He is aware that his insurance company denied STR and his Dr agrees with [employer]  He states he will appeal discharge once he is done with PT  Patient was provided with the Medicare Appeals QIO number to call  CM to follow       CM contacted family/caregiver?: No- see comments (Patient requests CM not contact his contacts )     Were Treatment Team discharge recommendations reviewed with patient/caregiver?: Yes  Did patient/caregiver verbalize understanding of patient care needs?: Yes  Were patient/caregiver advised of the risks associated with not following Treatment Team discharge recommendations?: Yes     IMM Given (Date):: 04/19/22  IMM Given to[de-identified] Patient

## 2022-04-22 ENCOUNTER — PATIENT OUTREACH (OUTPATIENT)
Dept: FAMILY MEDICINE CLINIC | Facility: CLINIC | Age: 62
End: 2022-04-22

## 2022-04-22 ENCOUNTER — TELEPHONE (OUTPATIENT)
Dept: PAIN MEDICINE | Facility: CLINIC | Age: 62
End: 2022-04-22

## 2022-04-22 ENCOUNTER — PATIENT OUTREACH (OUTPATIENT)
Dept: CASE MANAGEMENT | Facility: OTHER | Age: 62
End: 2022-04-22

## 2022-04-22 NOTE — NURSING NOTE
Patient Discharged home [facility] by  [facility] transportation  Alert and stable  oriented x 4  All belongings and Discharges information given as appropriate

## 2022-04-22 NOTE — PROGRESS NOTES
Pt was discharged home from hospital, pt was discussed between IP CM and OP CM  Pt was able to get home care through his insurance  SW spoke with pt prior and he did not have any social needs, and no referral was recently made for SW outreach  SW informed IP CM that SW will review and be available as needed  Reviewed chart post discharge  No social needs noted  No socially complex episode opened at this time  Note routed to PCP and Shai Pollard Dr

## 2022-04-22 NOTE — PROGRESS NOTES
A call was placed to Alonzo to check on his respiratory status after discharge from [facility]  Dez Ulysses is having difficulty placing outgoing calls with his phone  He is able to receive incoming calls  Alonzo is breathing without difficulty at this time  He is using his oxygen, taking his Trelegy inhaler and using Albuterol treatments via nebulizer as ordered  He is using his BiPAP without problems at 12/5 cm H2O  He is producing mucous with his cough and was reminded to watch for changes in thickness and color  We discussed proper inhaler technique including breath holding and mouth rinsing  Also discussed was the addition of light exercises as recovery continues  In addition, we reviewed the Zone Tool and when to contact the PCP or Venita Pulling is aware that he can reach out to his care team for concerns/questions  He is aware that he will be getting home health care and he has an appt scheduled with his Pulmonologist on 5/18

## 2022-04-22 NOTE — TELEPHONE ENCOUNTER
Pt reports 0% improvement post inj  Pain level 8/10  Pt states he was hospitalized 4/17-4/21 and wanted to let you know

## 2022-04-24 ENCOUNTER — HOSPITAL ENCOUNTER (INPATIENT)
Facility: HOSPITAL | Age: 62
LOS: 3 days | Discharge: NURSING, REHAB OR LONG-TERM CARE | DRG: 552 | End: 2022-04-28
Attending: EMERGENCY MEDICINE | Admitting: INTERNAL MEDICINE
Payer: COMMERCIAL

## 2022-04-24 ENCOUNTER — HOSPITAL ENCOUNTER (EMERGENCY)
Facility: HOSPITAL | Age: 62
Discharge: HOME | DRG: 552 | End: 2022-04-24
Attending: EMERGENCY MEDICINE
Payer: COMMERCIAL

## 2022-04-24 VITALS
WEIGHT: 275.57 LBS | DIASTOLIC BLOOD PRESSURE: 68 MMHG | RESPIRATION RATE: 20 BRPM | TEMPERATURE: 97.8 F | BODY MASS INDEX: 38.43 KG/M2 | SYSTOLIC BLOOD PRESSURE: 152 MMHG | HEART RATE: 86 BPM | OXYGEN SATURATION: 100 %

## 2022-04-24 DIAGNOSIS — D72.829 LEUKOCYTOSIS, UNSPECIFIED TYPE: ICD-10-CM

## 2022-04-24 DIAGNOSIS — E87.1 HYPONATREMIA: ICD-10-CM

## 2022-04-24 DIAGNOSIS — M54.16 LUMBAR RADICULOPATHY: ICD-10-CM

## 2022-04-24 DIAGNOSIS — Z86.39 H/O INSULIN DEPENDENT DIABETES MELLITUS: ICD-10-CM

## 2022-04-24 DIAGNOSIS — M54.50 ACUTE EXACERBATION OF CHRONIC LOW BACK PAIN: Primary | ICD-10-CM

## 2022-04-24 DIAGNOSIS — Z78.9 UNABLE TO CARE FOR SELF: ICD-10-CM

## 2022-04-24 DIAGNOSIS — M54.9 CHRONIC BACK PAIN: Primary | ICD-10-CM

## 2022-04-24 DIAGNOSIS — G89.29 CHRONIC BACK PAIN: Primary | ICD-10-CM

## 2022-04-24 DIAGNOSIS — R26.2 UNABLE TO WALK: ICD-10-CM

## 2022-04-24 DIAGNOSIS — G89.29 ACUTE EXACERBATION OF CHRONIC LOW BACK PAIN: Primary | ICD-10-CM

## 2022-04-24 LAB — GLUCOSE SERPL-MCNC: 127 MG/DL (ref 65–140)

## 2022-04-24 PROCEDURE — 99285 EMERGENCY DEPT VISIT HI MDM: CPT | Performed by: EMERGENCY MEDICINE

## 2022-04-24 PROCEDURE — 99284 EMERGENCY DEPT VISIT MOD MDM: CPT | Performed by: EMERGENCY MEDICINE

## 2022-04-24 PROCEDURE — 99284 EMERGENCY DEPT VISIT MOD MDM: CPT

## 2022-04-24 PROCEDURE — 82948 REAGENT STRIP/BLOOD GLUCOSE: CPT

## 2022-04-24 PROCEDURE — 99285 EMERGENCY DEPT VISIT HI MDM: CPT

## 2022-04-24 RX ORDER — BACLOFEN 10 MG/1
10 TABLET ORAL ONCE
Status: COMPLETED | OUTPATIENT
Start: 2022-04-24 | End: 2022-04-24

## 2022-04-24 RX ORDER — LIDOCAINE 50 MG/G
2 PATCH TOPICAL ONCE
Status: DISCONTINUED | OUTPATIENT
Start: 2022-04-24 | End: 2022-04-24 | Stop reason: HOSPADM

## 2022-04-24 RX ORDER — LIDOCAINE 50 MG/G
2 PATCH TOPICAL DAILY
Qty: 30 PATCH | Refills: 0 | Status: SHIPPED | OUTPATIENT
Start: 2022-04-24 | End: 2022-04-28 | Stop reason: HOSPADM

## 2022-04-24 RX ORDER — BACLOFEN 10 MG/1
10 TABLET ORAL EVERY 8 HOURS PRN
Qty: 30 TABLET | Refills: 0 | Status: SHIPPED | OUTPATIENT
Start: 2022-04-24 | End: 2022-07-21 | Stop reason: ALTCHOICE

## 2022-04-24 RX ORDER — HYDROMORPHONE HCL/PF 1 MG/ML
0.2 SYRINGE (ML) INJECTION ONCE
Status: COMPLETED | OUTPATIENT
Start: 2022-04-25 | End: 2022-04-25

## 2022-04-24 RX ORDER — METHYLPREDNISOLONE SODIUM SUCCINATE 125 MG/2ML
125 INJECTION, POWDER, LYOPHILIZED, FOR SOLUTION INTRAMUSCULAR; INTRAVENOUS ONCE
Status: COMPLETED | OUTPATIENT
Start: 2022-04-25 | End: 2022-04-25

## 2022-04-24 RX ADMIN — LIDOCAINE 5% 2 PATCH: 700 PATCH TOPICAL at 10:03

## 2022-04-24 RX ADMIN — BACLOFEN 10 MG: 10 TABLET ORAL at 10:04

## 2022-04-24 NOTE — ED NOTES
Pickup 1400 with NERIS Rodas RN  04/24/22 1113    Pickup time changed to 1230 with NERIS Rodas RN  04/24/22 1143

## 2022-04-24 NOTE — ED NOTES
NERIS called for transportation home, will call back with  time        Kay Petersen RN  04/24/22 4976
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
## 2022-04-25 ENCOUNTER — APPOINTMENT (OUTPATIENT)
Dept: RADIOLOGY | Facility: HOSPITAL | Age: 62
DRG: 552 | End: 2022-04-25
Payer: COMMERCIAL

## 2022-04-25 LAB
ALBUMIN SERPL BCP-MCNC: 4.2 G/DL (ref 3.5–5)
ALBUMIN SERPL BCP-MCNC: 4.6 G/DL (ref 3.5–5)
ALP SERPL-CCNC: 50 U/L (ref 46–116)
ALP SERPL-CCNC: 56 U/L (ref 46–116)
ALT SERPL W P-5'-P-CCNC: 50 U/L (ref 12–78)
ALT SERPL W P-5'-P-CCNC: 61 U/L (ref 12–78)
ANION GAP SERPL CALCULATED.3IONS-SCNC: 11 MMOL/L (ref 4–13)
ANION GAP SERPL CALCULATED.3IONS-SCNC: 12 MMOL/L (ref 4–13)
ANION GAP SERPL CALCULATED.3IONS-SCNC: 8 MMOL/L (ref 4–13)
APTT PPP: 26 SECONDS (ref 23–37)
AST SERPL W P-5'-P-CCNC: 34 U/L (ref 5–45)
AST SERPL W P-5'-P-CCNC: 75 U/L (ref 5–45)
BILIRUB SERPL-MCNC: 1.27 MG/DL (ref 0.2–1)
BILIRUB SERPL-MCNC: 1.51 MG/DL (ref 0.2–1)
BILIRUB UR QL STRIP: NEGATIVE
BUN SERPL-MCNC: 13 MG/DL (ref 5–25)
BUN SERPL-MCNC: 14 MG/DL (ref 5–25)
BUN SERPL-MCNC: 14 MG/DL (ref 5–25)
CALCIUM SERPL-MCNC: 9 MG/DL (ref 8.3–10.1)
CALCIUM SERPL-MCNC: 9 MG/DL (ref 8.3–10.1)
CALCIUM SERPL-MCNC: 9.3 MG/DL (ref 8.3–10.1)
CHLORIDE SERPL-SCNC: 84 MMOL/L (ref 100–108)
CHLORIDE SERPL-SCNC: 86 MMOL/L (ref 100–108)
CHLORIDE SERPL-SCNC: 87 MMOL/L (ref 100–108)
CLARITY UR: CLEAR
CO2 SERPL-SCNC: 26 MMOL/L (ref 21–32)
CO2 SERPL-SCNC: 26 MMOL/L (ref 21–32)
CO2 SERPL-SCNC: 27 MMOL/L (ref 21–32)
COLOR UR: YELLOW
CREAT SERPL-MCNC: 0.69 MG/DL (ref 0.6–1.3)
CREAT SERPL-MCNC: 0.82 MG/DL (ref 0.6–1.3)
CREAT SERPL-MCNC: 0.84 MG/DL (ref 0.6–1.3)
ERYTHROCYTE [DISTWIDTH] IN BLOOD BY AUTOMATED COUNT: 12.7 % (ref 11.6–15.1)
ERYTHROCYTE [DISTWIDTH] IN BLOOD BY AUTOMATED COUNT: 12.7 % (ref 11.6–15.1)
ERYTHROCYTE [DISTWIDTH] IN BLOOD BY AUTOMATED COUNT: 12.8 % (ref 11.6–15.1)
FLUAV RNA RESP QL NAA+PROBE: NEGATIVE
FLUBV RNA RESP QL NAA+PROBE: NEGATIVE
GFR SERPL CREATININE-BSD FRML MDRD: 101 ML/MIN/1.73SQ M
GFR SERPL CREATININE-BSD FRML MDRD: 93 ML/MIN/1.73SQ M
GFR SERPL CREATININE-BSD FRML MDRD: 94 ML/MIN/1.73SQ M
GLUCOSE P FAST SERPL-MCNC: 207 MG/DL (ref 65–99)
GLUCOSE SERPL-MCNC: 139 MG/DL (ref 65–140)
GLUCOSE SERPL-MCNC: 143 MG/DL (ref 65–140)
GLUCOSE SERPL-MCNC: 147 MG/DL (ref 65–140)
GLUCOSE SERPL-MCNC: 207 MG/DL (ref 65–140)
GLUCOSE SERPL-MCNC: 223 MG/DL (ref 65–140)
GLUCOSE SERPL-MCNC: 335 MG/DL (ref 65–140)
GLUCOSE SERPL-MCNC: 76 MG/DL (ref 65–140)
GLUCOSE UR STRIP-MCNC: ABNORMAL MG/DL
HCT VFR BLD AUTO: 40.8 % (ref 36.5–49.3)
HCT VFR BLD AUTO: 42.9 % (ref 36.5–49.3)
HCT VFR BLD AUTO: 44.4 % (ref 36.5–49.3)
HGB BLD-MCNC: 14.3 G/DL (ref 12–17)
HGB BLD-MCNC: 15.4 G/DL (ref 12–17)
HGB BLD-MCNC: 15.5 G/DL (ref 12–17)
HGB UR QL STRIP.AUTO: NEGATIVE
INR PPP: 0.99 (ref 0.84–1.19)
KETONES UR STRIP-MCNC: ABNORMAL MG/DL
LACTATE SERPL-SCNC: 1 MMOL/L (ref 0.5–2)
LEUKOCYTE ESTERASE UR QL STRIP: NEGATIVE
LYMPHOCYTES # BLD AUTO: 40 % (ref 14–44)
MAGNESIUM SERPL-MCNC: 2.2 MG/DL (ref 1.6–2.6)
MCH RBC QN AUTO: 30.9 PG (ref 26.8–34.3)
MCH RBC QN AUTO: 31.2 PG (ref 26.8–34.3)
MCH RBC QN AUTO: 31.4 PG (ref 26.8–34.3)
MCHC RBC AUTO-ENTMCNC: 34.7 G/DL (ref 31.4–37.4)
MCHC RBC AUTO-ENTMCNC: 35 G/DL (ref 31.4–37.4)
MCHC RBC AUTO-ENTMCNC: 36.1 G/DL (ref 31.4–37.4)
MCV RBC AUTO: 87 FL (ref 82–98)
MCV RBC AUTO: 89 FL (ref 82–98)
MCV RBC AUTO: 89 FL (ref 82–98)
MONOCYTES NFR BLD: 5 % (ref 4–12)
NEUTS BAND NFR BLD MANUAL: 1 % (ref 0–8)
NEUTS SEG NFR BLD AUTO: 39 % (ref 43–75)
NITRITE UR QL STRIP: NEGATIVE
OSMOLALITY UR/SERPL-RTO: 272 MMOL/KG (ref 282–298)
PATHOLOGY REVIEW: YES
PH UR STRIP.AUTO: 6 [PH]
PLATELET # BLD AUTO: 271 THOUSANDS/UL (ref 149–390)
PLATELET # BLD AUTO: 277 THOUSANDS/UL (ref 149–390)
PLATELET # BLD AUTO: 277 THOUSANDS/UL (ref 149–390)
PLATELET BLD QL SMEAR: ADEQUATE
PMV BLD AUTO: 8.7 FL (ref 8.9–12.7)
PMV BLD AUTO: 8.7 FL (ref 8.9–12.7)
PMV BLD AUTO: 8.8 FL (ref 8.9–12.7)
POTASSIUM SERPL-SCNC: 4.4 MMOL/L (ref 3.5–5.3)
POTASSIUM SERPL-SCNC: 4.9 MMOL/L (ref 3.5–5.3)
POTASSIUM SERPL-SCNC: 6 MMOL/L (ref 3.5–5.3)
PROT SERPL-MCNC: 7.2 G/DL (ref 6.4–8.2)
PROT SERPL-MCNC: 8.2 G/DL (ref 6.4–8.2)
PROT UR STRIP-MCNC: NEGATIVE MG/DL
PROTHROMBIN TIME: 12.9 SECONDS (ref 11.6–14.5)
RBC # BLD AUTO: 4.59 MILLION/UL (ref 3.88–5.62)
RBC # BLD AUTO: 4.93 MILLION/UL (ref 3.88–5.62)
RBC # BLD AUTO: 4.99 MILLION/UL (ref 3.88–5.62)
RSV RNA RESP QL NAA+PROBE: NEGATIVE
SARS-COV-2 RNA RESP QL NAA+PROBE: NEGATIVE
SODIUM SERPL-SCNC: 119 MMOL/L (ref 136–145)
SODIUM SERPL-SCNC: 123 MMOL/L (ref 136–145)
SODIUM SERPL-SCNC: 124 MMOL/L (ref 136–145)
SP GR UR STRIP.AUTO: 1.01 (ref 1–1.03)
UROBILINOGEN UR QL STRIP.AUTO: 0.2 E.U./DL
VARIANT LYMPHS # BLD AUTO: 15 %
WBC # BLD AUTO: 22.42 THOUSAND/UL (ref 4.31–10.16)
WBC # BLD AUTO: 23.88 THOUSAND/UL (ref 4.31–10.16)
WBC # BLD AUTO: 26.13 THOUSAND/UL (ref 4.31–10.16)

## 2022-04-25 PROCEDURE — 85730 THROMBOPLASTIN TIME PARTIAL: CPT | Performed by: EMERGENCY MEDICINE

## 2022-04-25 PROCEDURE — 94660 CPAP INITIATION&MGMT: CPT

## 2022-04-25 PROCEDURE — 85007 BL SMEAR W/DIFF WBC COUNT: CPT | Performed by: EMERGENCY MEDICINE

## 2022-04-25 PROCEDURE — 83735 ASSAY OF MAGNESIUM: CPT | Performed by: NURSE PRACTITIONER

## 2022-04-25 PROCEDURE — 80048 BASIC METABOLIC PNL TOTAL CA: CPT | Performed by: NURSE PRACTITIONER

## 2022-04-25 PROCEDURE — 84295 ASSAY OF SERUM SODIUM: CPT | Performed by: INTERNAL MEDICINE

## 2022-04-25 PROCEDURE — 83605 ASSAY OF LACTIC ACID: CPT | Performed by: NURSE PRACTITIONER

## 2022-04-25 PROCEDURE — 84300 ASSAY OF URINE SODIUM: CPT | Performed by: INTERNAL MEDICINE

## 2022-04-25 PROCEDURE — 87040 BLOOD CULTURE FOR BACTERIA: CPT | Performed by: NURSE PRACTITIONER

## 2022-04-25 PROCEDURE — 71260 CT THORAX DX C+: CPT

## 2022-04-25 PROCEDURE — 80053 COMPREHEN METABOLIC PANEL: CPT | Performed by: EMERGENCY MEDICINE

## 2022-04-25 PROCEDURE — 97110 THERAPEUTIC EXERCISES: CPT

## 2022-04-25 PROCEDURE — 0241U HB NFCT DS VIR RESP RNA 4 TRGT: CPT | Performed by: NURSE PRACTITIONER

## 2022-04-25 PROCEDURE — 97167 OT EVAL HIGH COMPLEX 60 MIN: CPT

## 2022-04-25 PROCEDURE — 85025 COMPLETE CBC W/AUTO DIFF WBC: CPT | Performed by: NURSE PRACTITIONER

## 2022-04-25 PROCEDURE — 85610 PROTHROMBIN TIME: CPT | Performed by: EMERGENCY MEDICINE

## 2022-04-25 PROCEDURE — 83930 ASSAY OF BLOOD OSMOLALITY: CPT | Performed by: NURSE PRACTITIONER

## 2022-04-25 PROCEDURE — 36415 COLL VENOUS BLD VENIPUNCTURE: CPT | Performed by: EMERGENCY MEDICINE

## 2022-04-25 PROCEDURE — 85027 COMPLETE CBC AUTOMATED: CPT | Performed by: EMERGENCY MEDICINE

## 2022-04-25 PROCEDURE — 81003 URINALYSIS AUTO W/O SCOPE: CPT | Performed by: EMERGENCY MEDICINE

## 2022-04-25 PROCEDURE — 83935 ASSAY OF URINE OSMOLALITY: CPT | Performed by: INTERNAL MEDICINE

## 2022-04-25 PROCEDURE — 80053 COMPREHEN METABOLIC PANEL: CPT | Performed by: NURSE PRACTITIONER

## 2022-04-25 PROCEDURE — 94760 N-INVAS EAR/PLS OXIMETRY 1: CPT

## 2022-04-25 PROCEDURE — 74177 CT ABD & PELVIS W/CONTRAST: CPT

## 2022-04-25 PROCEDURE — 85027 COMPLETE CBC AUTOMATED: CPT | Performed by: NURSE PRACTITIONER

## 2022-04-25 PROCEDURE — G1004 CDSM NDSC: HCPCS

## 2022-04-25 PROCEDURE — 99220 PR INITIAL OBSERVATION CARE/DAY 70 MINUTES: CPT | Performed by: INTERNAL MEDICINE

## 2022-04-25 PROCEDURE — 99222 1ST HOSP IP/OBS MODERATE 55: CPT | Performed by: INTERNAL MEDICINE

## 2022-04-25 PROCEDURE — 97163 PT EVAL HIGH COMPLEX 45 MIN: CPT

## 2022-04-25 PROCEDURE — 97535 SELF CARE MNGMENT TRAINING: CPT

## 2022-04-25 PROCEDURE — 82948 REAGENT STRIP/BLOOD GLUCOSE: CPT

## 2022-04-25 RX ORDER — METOPROLOL SUCCINATE 100 MG/1
200 TABLET, EXTENDED RELEASE ORAL DAILY
Status: DISCONTINUED | OUTPATIENT
Start: 2022-04-25 | End: 2022-04-28 | Stop reason: HOSPADM

## 2022-04-25 RX ORDER — QUETIAPINE FUMARATE 100 MG/1
300 TABLET, FILM COATED ORAL
Status: DISCONTINUED | OUTPATIENT
Start: 2022-04-25 | End: 2022-04-28 | Stop reason: HOSPADM

## 2022-04-25 RX ORDER — FLUTICASONE FUROATE AND VILANTEROL 100; 25 UG/1; UG/1
1 POWDER RESPIRATORY (INHALATION) DAILY
Status: DISCONTINUED | OUTPATIENT
Start: 2022-04-25 | End: 2022-04-28 | Stop reason: HOSPADM

## 2022-04-25 RX ORDER — TRAMADOL HYDROCHLORIDE 50 MG/1
50 TABLET ORAL EVERY 6 HOURS PRN
Status: DISCONTINUED | OUTPATIENT
Start: 2022-04-25 | End: 2022-04-28 | Stop reason: HOSPADM

## 2022-04-25 RX ORDER — OXYCODONE HYDROCHLORIDE 5 MG/1
2.5 TABLET ORAL EVERY 4 HOURS PRN
Status: DISCONTINUED | OUTPATIENT
Start: 2022-04-25 | End: 2022-04-25

## 2022-04-25 RX ORDER — ALBUTEROL SULFATE 2.5 MG/3ML
2.5 SOLUTION RESPIRATORY (INHALATION) EVERY 6 HOURS PRN
Status: DISCONTINUED | OUTPATIENT
Start: 2022-04-25 | End: 2022-04-27

## 2022-04-25 RX ORDER — OXYCODONE HYDROCHLORIDE 5 MG/1
5 TABLET ORAL EVERY 4 HOURS PRN
Status: DISCONTINUED | OUTPATIENT
Start: 2022-04-25 | End: 2022-04-28 | Stop reason: HOSPADM

## 2022-04-25 RX ORDER — QUETIAPINE FUMARATE 50 MG/1
100 TABLET, EXTENDED RELEASE ORAL EVERY MORNING
Status: DISCONTINUED | OUTPATIENT
Start: 2022-04-25 | End: 2022-04-28 | Stop reason: HOSPADM

## 2022-04-25 RX ORDER — SODIUM CHLORIDE 9 MG/ML
50 INJECTION, SOLUTION INTRAVENOUS CONTINUOUS
Status: DISPENSED | OUTPATIENT
Start: 2022-04-25 | End: 2022-04-25

## 2022-04-25 RX ORDER — ACETAMINOPHEN 325 MG/1
650 TABLET ORAL EVERY 6 HOURS PRN
Status: DISCONTINUED | OUTPATIENT
Start: 2022-04-25 | End: 2022-04-28 | Stop reason: HOSPADM

## 2022-04-25 RX ORDER — ALPRAZOLAM 0.5 MG/1
2 TABLET ORAL
Status: DISCONTINUED | OUTPATIENT
Start: 2022-04-25 | End: 2022-04-28 | Stop reason: HOSPADM

## 2022-04-25 RX ORDER — BACLOFEN 10 MG/1
10 TABLET ORAL EVERY 8 HOURS PRN
Status: DISCONTINUED | OUTPATIENT
Start: 2022-04-25 | End: 2022-04-26

## 2022-04-25 RX ORDER — KETOROLAC TROMETHAMINE 10 MG/1
10 TABLET, FILM COATED ORAL EVERY 6 HOURS PRN
Status: DISCONTINUED | OUTPATIENT
Start: 2022-04-25 | End: 2022-04-25

## 2022-04-25 RX ORDER — SPIRONOLACTONE 25 MG/1
25 TABLET ORAL DAILY
Status: DISCONTINUED | OUTPATIENT
Start: 2022-04-25 | End: 2022-04-25

## 2022-04-25 RX ORDER — KETOROLAC TROMETHAMINE 30 MG/ML
15 INJECTION, SOLUTION INTRAMUSCULAR; INTRAVENOUS EVERY 6 HOURS PRN
Status: DISCONTINUED | OUTPATIENT
Start: 2022-04-25 | End: 2022-04-26

## 2022-04-25 RX ORDER — ASCORBIC ACID 500 MG
1000 TABLET ORAL DAILY
Status: DISCONTINUED | OUTPATIENT
Start: 2022-04-25 | End: 2022-04-28 | Stop reason: HOSPADM

## 2022-04-25 RX ORDER — BUSPIRONE HYDROCHLORIDE 10 MG/1
10 TABLET ORAL 2 TIMES DAILY
Status: DISCONTINUED | OUTPATIENT
Start: 2022-04-25 | End: 2022-04-28 | Stop reason: HOSPADM

## 2022-04-25 RX ORDER — ATORVASTATIN CALCIUM 80 MG/1
80 TABLET, FILM COATED ORAL DAILY
Status: DISCONTINUED | OUTPATIENT
Start: 2022-04-25 | End: 2022-04-28 | Stop reason: HOSPADM

## 2022-04-25 RX ORDER — INSULIN GLARGINE 100 [IU]/ML
75 INJECTION, SOLUTION SUBCUTANEOUS EVERY 12 HOURS SCHEDULED
Status: DISCONTINUED | OUTPATIENT
Start: 2022-04-25 | End: 2022-04-26

## 2022-04-25 RX ORDER — MELATONIN
1000 DAILY
Status: DISCONTINUED | OUTPATIENT
Start: 2022-04-25 | End: 2022-04-28 | Stop reason: HOSPADM

## 2022-04-25 RX ORDER — GABAPENTIN 300 MG/1
300 CAPSULE ORAL 3 TIMES DAILY
Status: DISCONTINUED | OUTPATIENT
Start: 2022-04-25 | End: 2022-04-28 | Stop reason: HOSPADM

## 2022-04-25 RX ORDER — HYDROMORPHONE HCL/PF 1 MG/ML
1 SYRINGE (ML) INJECTION EVERY 4 HOURS PRN
Status: DISCONTINUED | OUTPATIENT
Start: 2022-04-25 | End: 2022-04-25

## 2022-04-25 RX ORDER — LOSARTAN POTASSIUM 50 MG/1
50 TABLET ORAL DAILY
Status: DISCONTINUED | OUTPATIENT
Start: 2022-04-25 | End: 2022-04-26

## 2022-04-25 RX ORDER — OMEGA-3-ACID ETHYL ESTERS 1 G/1
2 CAPSULE, LIQUID FILLED ORAL 2 TIMES DAILY
Status: DISCONTINUED | OUTPATIENT
Start: 2022-04-25 | End: 2022-04-25

## 2022-04-25 RX ORDER — ASPIRIN 81 MG/1
81 TABLET ORAL DAILY
Status: DISCONTINUED | OUTPATIENT
Start: 2022-04-25 | End: 2022-04-28 | Stop reason: HOSPADM

## 2022-04-25 RX ORDER — HYDROMORPHONE HCL/PF 1 MG/ML
0.5 SYRINGE (ML) INJECTION
Status: DISCONTINUED | OUTPATIENT
Start: 2022-04-25 | End: 2022-04-28 | Stop reason: HOSPADM

## 2022-04-25 RX ORDER — HYDROMORPHONE HCL/PF 1 MG/ML
0.2 SYRINGE (ML) INJECTION ONCE
Status: COMPLETED | OUTPATIENT
Start: 2022-04-25 | End: 2022-04-25

## 2022-04-25 RX ORDER — DIGOXIN 125 MCG
250 TABLET ORAL DAILY
Status: DISCONTINUED | OUTPATIENT
Start: 2022-04-25 | End: 2022-04-28 | Stop reason: HOSPADM

## 2022-04-25 RX ORDER — HYDROMORPHONE HCL/PF 1 MG/ML
0.5 SYRINGE (ML) INJECTION
Status: DISCONTINUED | OUTPATIENT
Start: 2022-04-25 | End: 2022-04-25

## 2022-04-25 RX ADMIN — QUETIAPINE FUMARATE 300 MG: 100 TABLET ORAL at 21:25

## 2022-04-25 RX ADMIN — GABAPENTIN 300 MG: 300 CAPSULE ORAL at 16:50

## 2022-04-25 RX ADMIN — ATORVASTATIN CALCIUM 80 MG: 80 TABLET, FILM COATED ORAL at 09:36

## 2022-04-25 RX ADMIN — INSULIN LISPRO 35 UNITS: 100 INJECTION, SOLUTION INTRAVENOUS; SUBCUTANEOUS at 16:52

## 2022-04-25 RX ADMIN — LOSARTAN POTASSIUM 50 MG: 50 TABLET, FILM COATED ORAL at 09:34

## 2022-04-25 RX ADMIN — BUSPIRONE HYDROCHLORIDE 10 MG: 10 TABLET ORAL at 09:36

## 2022-04-25 RX ADMIN — INSULIN LISPRO 8 UNITS: 100 INJECTION, SOLUTION INTRAVENOUS; SUBCUTANEOUS at 11:25

## 2022-04-25 RX ADMIN — SODIUM CHLORIDE 50 ML/HR: 0.9 INJECTION, SOLUTION INTRAVENOUS at 10:29

## 2022-04-25 RX ADMIN — ASPIRIN 81 MG: 81 TABLET, COATED ORAL at 09:34

## 2022-04-25 RX ADMIN — INSULIN GLARGINE 75 UNITS: 100 INJECTION, SOLUTION SUBCUTANEOUS at 21:25

## 2022-04-25 RX ADMIN — HYDROMORPHONE HYDROCHLORIDE 0.5 MG: 1 INJECTION, SOLUTION INTRAMUSCULAR; INTRAVENOUS; SUBCUTANEOUS at 06:14

## 2022-04-25 RX ADMIN — HYDROMORPHONE HYDROCHLORIDE 0.5 MG: 1 INJECTION, SOLUTION INTRAMUSCULAR; INTRAVENOUS; SUBCUTANEOUS at 09:17

## 2022-04-25 RX ADMIN — DIGOXIN 250 MCG: 125 TABLET ORAL at 09:34

## 2022-04-25 RX ADMIN — GABAPENTIN 300 MG: 300 CAPSULE ORAL at 09:36

## 2022-04-25 RX ADMIN — METOPROLOL SUCCINATE 200 MG: 100 TABLET, EXTENDED RELEASE ORAL at 09:34

## 2022-04-25 RX ADMIN — QUETIAPINE FUMARATE 100 MG: 50 TABLET, EXTENDED RELEASE ORAL at 10:23

## 2022-04-25 RX ADMIN — APIXABAN 5 MG: 5 TABLET, FILM COATED ORAL at 09:36

## 2022-04-25 RX ADMIN — IOHEXOL 100 ML: 350 INJECTION, SOLUTION INTRAVENOUS at 04:00

## 2022-04-25 RX ADMIN — HYDROMORPHONE HYDROCHLORIDE 0.2 MG: 1 INJECTION, SOLUTION INTRAMUSCULAR; INTRAVENOUS; SUBCUTANEOUS at 02:38

## 2022-04-25 RX ADMIN — BUSPIRONE HYDROCHLORIDE 10 MG: 10 TABLET ORAL at 18:07

## 2022-04-25 RX ADMIN — INSULIN LISPRO 35 UNITS: 100 INJECTION, SOLUTION INTRAVENOUS; SUBCUTANEOUS at 11:28

## 2022-04-25 RX ADMIN — FLUTICASONE FUROATE AND VILANTEROL TRIFENATATE 1 PUFF: 100; 25 POWDER RESPIRATORY (INHALATION) at 10:17

## 2022-04-25 RX ADMIN — INSULIN GLARGINE 75 UNITS: 100 INJECTION, SOLUTION SUBCUTANEOUS at 09:36

## 2022-04-25 RX ADMIN — OXYCODONE HYDROCHLORIDE AND ACETAMINOPHEN 1000 MG: 500 TABLET ORAL at 09:36

## 2022-04-25 RX ADMIN — HYDROMORPHONE HYDROCHLORIDE 0.5 MG: 1 INJECTION, SOLUTION INTRAMUSCULAR; INTRAVENOUS; SUBCUTANEOUS at 16:51

## 2022-04-25 RX ADMIN — ALPRAZOLAM 2 MG: 0.5 TABLET ORAL at 21:24

## 2022-04-25 RX ADMIN — METHYLPREDNISOLONE SODIUM SUCCINATE 125 MG: 125 INJECTION, POWDER, FOR SOLUTION INTRAMUSCULAR; INTRAVENOUS at 00:22

## 2022-04-25 RX ADMIN — GABAPENTIN 300 MG: 300 CAPSULE ORAL at 21:25

## 2022-04-25 RX ADMIN — KETOROLAC TROMETHAMINE 15 MG: 30 INJECTION, SOLUTION INTRAMUSCULAR at 19:45

## 2022-04-25 RX ADMIN — SERTRALINE HYDROCHLORIDE 50 MG: 50 TABLET ORAL at 09:36

## 2022-04-25 RX ADMIN — Medication 1000 UNITS: at 09:36

## 2022-04-25 RX ADMIN — APIXABAN 5 MG: 5 TABLET, FILM COATED ORAL at 18:07

## 2022-04-25 RX ADMIN — OXYCODONE HYDROCHLORIDE 5 MG: 5 TABLET ORAL at 14:30

## 2022-04-25 RX ADMIN — HYDROMORPHONE HYDROCHLORIDE 0.5 MG: 1 INJECTION, SOLUTION INTRAMUSCULAR; INTRAVENOUS; SUBCUTANEOUS at 21:25

## 2022-04-25 RX ADMIN — HYDROMORPHONE HYDROCHLORIDE 0.2 MG: 1 INJECTION, SOLUTION INTRAMUSCULAR; INTRAVENOUS; SUBCUTANEOUS at 00:22

## 2022-04-25 RX ADMIN — KETOROLAC TROMETHAMINE 15 MG: 30 INJECTION, SOLUTION INTRAMUSCULAR at 11:47

## 2022-04-25 RX ADMIN — SPIRONOLACTONE 25 MG: 25 TABLET ORAL at 09:34

## 2022-04-25 NOTE — ASSESSMENT & PLAN NOTE
Patient presented to ED with complaints of back pain  He states he wanted to go to rehab on discharge but was declined by insurance  He continues to have pain  He has had no improvement with his injections  Patient has spoken with his pain management doctor - no further recommendations  Generalized weakness, no focal deficit  No loss of bowel or bladder      · Admit to medicine  · PT/OT/CM  · Consider MRI lumbar spine

## 2022-04-25 NOTE — ASSESSMENT & PLAN NOTE
As evidenced by tachycardia, leukocytosis  Differential unavailable - sent to pathologist for review  No evidence of active infection  Lactic negative  CT chest, abdomen and pelvis with possible colitis, however, patient has no abdominal pain, diarrhea      · Follow up differential   · Urinalysis pending   · Follow up blood cultures   · Consider MRI of lumbar spine depending on clinical course

## 2022-04-25 NOTE — PLAN OF CARE
Problem: PAIN - ADULT  Goal: Verbalizes/displays adequate comfort level or baseline comfort level  Description: Interventions:  - Encourage patient to monitor pain and request assistance  - Assess pain using appropriate pain scale  - Administer analgesics based on type and severity of pain and evaluate response  - Implement non-pharmacological measures as appropriate and evaluate response  - Consider cultural and social influences on pain and pain management  - Notify physician/advanced practitioner if interventions unsuccessful or patient reports new pain  Outcome: Progressing     Problem: INFECTION - ADULT  Goal: Absence or prevention of progression during hospitalization  Description: INTERVENTIONS:  - Assess and monitor for signs and symptoms of infection  - Monitor lab/diagnostic results  - Monitor all insertion sites, i e  indwelling lines, tubes, and drains  - Monitor endotracheal if appropriate and nasal secretions for changes in amount and color  - Crawford appropriate cooling/warming therapies per order  - Administer medications as ordered  - Instruct and encourage patient and family to use good hand hygiene technique  - Identify and instruct in appropriate isolation precautions for identified infection/condition  Outcome: Progressing  Goal: Absence of fever/infection during neutropenic period  Description: INTERVENTIONS:  - Monitor WBC    Outcome: Progressing     Problem: SAFETY ADULT  Goal: Patient will remain free of falls  Description: INTERVENTIONS:  - Educate patient/family on patient safety including physical limitations  - Instruct patient to call for assistance with activity   - Consult OT/PT to assist with strengthening/mobility   - Keep Call bell within reach  - Keep bed low and locked with side rails adjusted as appropriate  - Keep care items and personal belongings within reach  - Initiate and maintain comfort rounds  - Make Fall Risk Sign visible to staff  - Offer Toileting every 2 Hours, in advance of need  - Initiate/Maintain bed alarm  - Obtain necessary fall risk management equipment: call bell   - Apply yellow socks and bracelet for high fall risk patients  - Consider moving patient to room near nurses station  Outcome: Progressing  Goal: Maintain or return to baseline ADL function  Description: INTERVENTIONS:  -  Assess patient's ability to carry out ADLs; assess patient's baseline for ADL function and identify physical deficits which impact ability to perform ADLs (bathing, care of mouth/teeth, toileting, grooming, dressing, etc )  - Assess/evaluate cause of self-care deficits   - Assess range of motion  - Assess patient's mobility; develop plan if impaired  - Assess patient's need for assistive devices and provide as appropriate  - Encourage maximum independence but intervene and supervise when necessary  - Involve family in performance of ADLs  - Assess for home care needs following discharge   - Consider OT consult to assist with ADL evaluation and planning for discharge  - Provide patient education as appropriate  Outcome: Progressing  Goal: Maintains/Returns to pre admission functional level  Description: INTERVENTIONS:  - Perform BMAT or MOVE assessment daily    - Set and communicate daily mobility goal to care team and patient/family/caregiver  - Collaborate with rehabilitation services on mobility goals if consulted  - Perform Range of Motion 3 times a day  - Reposition patient every 3 hours    - Dangle patient 3 times a day  - Stand patient 3 times a day  - Ambulate patient 3 times a day  - Out of bed to chair 3 times a day   - Out of bed for meals 3 times a day  - Out of bed for toileting  - Record patient progress and toleration of activity level   Outcome: Progressing     Problem: DISCHARGE PLANNING  Goal: Discharge to home or other facility with appropriate resources  Description: INTERVENTIONS:  - Identify barriers to discharge w/patient and caregiver  - Arrange for needed discharge resources and transportation as appropriate  - Identify discharge learning needs (meds, wound care, etc )  - Arrange for interpretive services to assist at discharge as needed  - Refer to Case Management Department for coordinating discharge planning if the patient needs post-hospital services based on physician/advanced practitioner order or complex needs related to functional status, cognitive ability, or social support system  Outcome: Progressing     Problem: Knowledge Deficit  Goal: Patient/family/caregiver demonstrates understanding of disease process, treatment plan, medications, and discharge instructions  Description: Complete learning assessment and assess knowledge base    Interventions:  - Provide teaching at level of understanding  - Provide teaching via preferred learning methods  Outcome: Progressing

## 2022-04-25 NOTE — ASSESSMENT & PLAN NOTE
Lab Results   Component Value Date    HGBA1C 8 6 (H) 03/25/2022       Recent Labs     04/24/22  0952   POCGLU 127       Blood Sugar Average: Last 72 hrs:   continue Lantus 75 units b i d   Accu-Cheks a c  HS with insulin sliding scale
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
## 2022-04-25 NOTE — ASSESSMENT & PLAN NOTE
Wt Readings from Last 3 Encounters:   04/24/22 125 kg (275 lb 9 2 oz)   04/17/22 124 kg (273 lb 11 2 oz)   03/29/22 128 kg (282 lb 3 oz)   Most recent echo revealed an EF of 55% with by atrial dilatation, mild pulmonary regurg, no regional wall motion abnormalities  Continue digoxin and Lopressor  Will hold Demadex and Aldactone  Monitor intake and output  Daily weights

## 2022-04-25 NOTE — OCCUPATIONAL THERAPY NOTE
Occupational Therapy Evaluation/Treatment       04/25/22 1049   Note Type   Note type Evaluation   Restrictions/Precautions   Other Precautions Chair Alarm; Bed Alarm; Fall Risk;Pain;O2   Pain Assessment   Pain Assessment Tool 0-10   Pain Score 10 - Worst Possible Pain  (nurse gave pain meds 15 minutes prior to OT session)   Pain Location/Orientation Location: Back  (pt complains of pain with movement of even his fingers)   Home Living   Type of [address] One level;Elevator   Home Equipment Walker;Cane   Additional Comments Pt with multiple recent admissions for back pain and falls  Patient reports unable to care for himself at home   Prior Function   Level of [place] Independent with ADLs and functional mobility; Needs assistance with IADLs   Lives With Alone   ADL Assistance Independent   IADLs Independent   ADL   Eating Assistance 4  Minimal Assistance   Grooming Assistance 3  Moderate Assistance   UB Bathing Assistance 3  Moderate Assistance   LB Bathing Assistance 3  Moderate Assistance   UB Dressing Assistance 3  Moderate Assistance   LB Dressing Assistance 3  Moderate Assistance   Toileting Assistance  3  Moderate Assistance   Bed Mobility   Supine to Sit 3  Moderate assistance   Sit to Supine 3  Moderate assistance   Transfers   Sit to Stand 4  Minimal assistance   Stand to Sit 4  Minimal assistance   Functional Mobility   Functional Mobility   (pt declined due to pain, unable to assess )   Balance   Static Sitting Fair   Dynamic Sitting Fair   Static Standing Fair   Dynamic Standing Fair -   Activity Tolerance   Activity Tolerance Patient limited by pain; Patient limited by fatigue   Nurse Made Aware yes, Yael JASON Assessment   RUE Assessment WFL  (pt observed functionally, when asked will not complete ROM due to pain)   LUE Assessment   LUE Assessment WFL  (pt observed functionally, when asked will not complete ROM due to pain)   Cognition   Overall Cognitive Status [facility] Arousal/Participation Alert   Attention Within functional limits   Orientation Level Oriented X4   Following Commands Follows one step commands with increased time or repetition   Assessment   Limitation Decreased ADL status; Decreased UE strength;Decreased Safe judgement during ADL;Decreased endurance;Decreased self-care trans;Decreased high-level ADLs  (decreased balance and mobility )   Prognosis Good   Assessment Patient evaluated by Occupational Therapy  Patient admitted with Back pain  The patients occupational profile, medical and therapy history includes a extensive additional review of physical, cognitive, or psychosocial history related to current functional performance  Comorbidities affecting functional mobility and ADLS include: Afib, anxiety, Bipolar disorder, CHF, COPD, depression, diabetes and hypertension  Prior to admission, patient was independent with functional mobility with cane/walker, independent with ADLS and independent with IADLS  The evaluation identifies the following performance deficits: weakness, impaired balance, decreased endurance, increased fall risk, new onset of impairment of functional mobility, decreased ADLS, decreased IADLS, pain, decreased activity tolerance, decreased safety awareness, impaired judgement, decreased cognition and decreased strength, that result in activity limitations and/or participation restrictions  This evaluation requires clinical decision making of high complexity, because the patient presents with comorbidites that affect occupational performance and required significant modification of tasks or assistance with consideration of multiple treatment options  The Barthel Index was used as a functional outcome tool presenting with a score of Barthel Index Score: 40, indicating marked limitations of functional mobility and ADLS    The patient's raw score on the -PAC Daily Activity inpatient short form is 14, standardized score is 33 39, less than 39 4  Patients at this level are likely to benefit from DC to post-acute rehabilitation services  Please refer to the recommendation of the Occupational Therapist for safe DC planning  Patient will benefit from skilled Occupational Therapy services to address above deficits and facilitate a safe return to prior level of function  Goals   Patient Goals less pain   STG Time Frame   (1-7 days)   Short Term Goal  Goals established to promote Patient Goals: less pain:  Patient will increase standing tolerance to 3 minutes during ADL task to decrease assistance level and decrease fall risk; Patient will increase bed mobility to min assist in preparation for ADLS and transfers; Patient will increase functional mobility to and from bathroom with rolling walker with min assist to increase performance with ADLS and to use a toilet; Patient will tolerate 10 minutes of UE ROM/strengthening to increase general activity tolerance and performance in ADLS/IADLS; Patient will improve functional activity tolerance to 10 minutes of sustained functional tasks to increase participation in basic self-care and decrease assistance level;  Patient will be able to to verbalize understanding and perform energy conservation/proper body mechanics during ADLS and functional mobility at least 75% of the time with minimal cueing to decrease signs of fatigue and increase stamina to return to prior level of function; Patient will increase dynamic sitting balance to fair+ to improve the ability to sit at edge of bed or on a chair for ADLS;  Patient will increase dynamic standing balance to fair to improve postural stability and decrease fall risk during standing ADLS and transfers  LTG Time Frame   (8-14 days)   Long Term Goal Patient will increase standing tolerance to 6 minutes during ADL task to decrease assistance level and decrease fall risk; Patient will increase bed mobility to supervision in preparation for ADLS and transfers;  Patient will increase functional mobility to and from bathroom with rolling walker with supervision to increase performance with ADLS and to use a toilet; Patient will tolerate 20 minutes of UE ROM/strengthening to increase general activity tolerance and performance in ADLS/IADLS; Patient will improve functional activity tolerance to 20 minutes of sustained functional tasks to increase participation in basic self-care and decrease assistance level;  Patient will be able to to verbalize understanding and perform energy conservation/proper body mechanics during ADLS and functional mobility at least 90% of the time to decrease signs of fatigue and increase stamina to return to prior level of function; Patient will increase dynamic sitting balance to good to improve the ability to sit at edge of bed or on a chair for ADLS;  Patient will increase dynamic standing balance to fair+ to improve postural stability and decrease fall risk during standing ADLS and transfers  Pt will score >/= 18/24 on AM-PAC Daily Activity Inpatient scale to promote safe independence with ADLs and functional mobility; Pt will score >/= 70/100 on Barthel Index in order to decrease caregiver assistance needed and increase ability to perform ADLs and functional mobility  Functional Transfer Goals   Pt Will Perform All Functional Transfers   (STG supervision LTG independent)   ADL Goals   Pt Will Perform Eating   (STG supervision LTG Independent )   Pt Will Perform Grooming   (STG supervision LTG Independent)   Pt Will Perform Bathing   (STG min assist LTG supervision )   Pt Will Perform UE Dressing   (STG min assist LTG supervision )   Pt Will Perform LE Dressing   (STG min assist LTG supervision )   Pt Will Perform Toileting   (STG min assist LTG supervision )   Plan   Treatment Interventions ADL retraining;Functional transfer training;UE strengthening/ROM; Endurance training;Patient/family training;Equipment evaluation/education; Activityengagement; Compensatory technique education   Goal Expiration Date 05/09/22   OT Frequency 3-5x/wk   Additional Treatment Session   Start Time 7777   End Time 1049   Treatment Assessment S: 10/10 back pain O: Doffed tshirt with mod assist and doffed shorts with max assist  Patient able to raise B shoulders to 80 degrees while completing shirt doffing  Stand to sit min assist   Sit to supine min assist   Donned gown with mod assist   A: Patient with limitations due to pain  Patient reports not being able to care for himself or his apartment anymore  Patient would like to pursue long term care    P: STR    Recommendation   OT Discharge Recommendation Post acute rehabilitation services   AM-PAC Daily Activity Inpatient   Lower Body Dressing 2   Bathing 2   Toileting 2   Upper Body Dressing 2   Grooming 3   Eating 3   Daily Activity Raw Score 14   Daily Activity Standardized Score (Calc for Raw Score >=11) 33 39   AM-PAC Applied Cognition Inpatient   Following a Speech/Presentation 4   Understanding Ordinary Conversation 4   Taking Medications 4   Remembering Where Things Are Placed or Put Away 4   Remembering List of 4-5 Errands 4   Taking Care of Complicated Tasks 4   Applied Cognition Raw Score 24   Applied Cognition Standardized Score 62 21   Barthel Index   Feeding 5   Bathing 0   Grooming Score 0   Dressing Score 0   Bladder Score 10   Bowels Score 10   Toilet Use Score 5   Transfers (Bed/Chair) Score 10   Mobility (Level Surface) Score 0   Stairs Score 0   Barthel Index Score 40   Licensure   [place] License Number  Catalina Davis Gary 87 [id number] [id number]

## 2022-04-25 NOTE — PHYSICAL THERAPY NOTE
PHYSICAL THERAPY EVALUATION/TREATMENT     04/25/22 1445   Note Type   Note type Evaluation   Pain Assessment   Pain Assessment Tool 0-10   Pain Score 10 - Worst Possible Pain  (LS area)   Restrictions/Precautions   Other Precautions Chair Alarm; Bed Alarm; Fall Risk;Pain;O2   Home Living   Type of Home Apartment   Home Layout One level;Elevator   Home Equipment Walker;Cane;Wheelchair-electric  (Hover round chair)   Additional Comments Patient states using walker and/or cane prior to admission   Prior Function   Level of [place] Independent with ADLs and functional mobility   Lives With Alone   ADL Assistance Independent   Comments Patient states recently requiring more assist unable to care for himself and his home due to increasing back pain   General   Additional Pertinent History Chart reviewed, patient admitted with back pain  Patient recently discharged home return stating inability to care for himself due to back pain  Family/Caregiver Present No   Cognition   Overall Cognitive Status WFL   Arousal/Participation Cooperative   Attention Within functional limits   Orientation Level Oriented X4   Following Commands Follows one step commands with increased time or repetition   Comments Patient easily distracted due to pain    Subjective   Subjective I cannot care for myself at home   RLE Assessment   RLE Assessment   (ROM WFL, strength 3+/5)   LLE Assessment   LLE Assessment   (ROM WFL, strength 3+/5)   Coordination   Movements are Fluid and Coordinated 0   Bed Mobility   Supine to Sit 4  Minimal assistance   Additional items Assist x 1;Verbal cues   Sit to Supine 4  Minimal assistance   Additional items Assist x 1;Verbal cues;LE management   Transfers   Sit to Stand 4  Minimal assistance   Additional items Assist x 1   Stand to Sit 4  Minimal assistance   Additional items Assist x 1;Verbal cues   Ambulation/Elevation   Gait Assistance   (Min to mod assist)   Additional items Verbal cues; Tactile cues Assistive Device Rolling walker   Distance 5 ft with impulsive return to supine   Balance   Static Sitting Fair   Dynamic Sitting Fair -   Static Standing Fair   Dynamic Standing [address] -   Activity Tolerance   Activity Tolerance Patient limited by fatigue;Patient limited by pain   Nurse Made Aware yes   Assessment   Prognosis Good   Problem List Decreased strength;Decreased range of motion;Decreased endurance; Impaired balance;Decreased mobility; Decreased coordination;Pain   Assessment Patient seen for Physical Therapy evaluation  Patient admitted with Back pain  Comorbidities affecting patient's physical performance include:Afib, anxiety, Bipolar disorder, CHF, COPD, depression, diabetes and hypertension   Personal factors affecting patient at time of initial evaluation include: ambulating with assistive device, inability to ambulate household distances, inability to navigate community distances, inability to navigate level surfaces without external assistance, inability to perform dynamic tasks in community, limited home support, positive fall history, inability to perform physical activity, inability to perform ADLS and inability to perform IADLS   Prior to admission, patient was requiring assist for functional mobility with walker, independent with ADLS, requiring assist for IADLS and ambulating household distance  Please find objective findings from Physical Therapy assessment regarding body systems outlined above with impairments and limitations including weakness, decreased ROM, impaired balance, decreased endurance, impaired coordination, gait deviations, pain, decreased activity tolerance, decreased functional mobility tolerance and fall risk  The Barthel Index was used as a functional outcome tool presenting with a score of Barthel Index Score: 45 today indicating marked limitations of functional mobility and ADLS    Patient's clinical presentation is currently unstable/unpredictable as seen in patient's presentation of vital sign response, changing level of pain, increased fall risk, new onset of impairment of functional mobility, decreased endurance and new onset of weakness  Pt would benefit from continued Physical Therapy treatment to address deficits as defined above and maximize level of functional mobility  As demonstrated by objective findings, the assigned level of complexity for this evaluation is high  The patient's -[facility] Basic Mobility Inpatient Short Form Raw Score is 16  A Raw score of less than or equal to 16 suggests the patient may benefit from discharge to post-acute rehabilitation services  Please also refer to the recommendation of the Physical Therapist for safe discharge planning  Goals   Patient Goals To decrease pain   STG Expiration Date 05/02/22   Short Term Goal #1 Transfers and gait independently with roller walker   Short Term Goal #2 Gait endurance to functional household distances   LTG Expiration Date 05/09/22   Long Term Goal #1 Strength bilateral lower extremities 4-/ 5   Long Term Goal #2 Gait with roller walker to functional community distances   Plan   Treatment/Interventions ADL retraining;Functional transfer training;LE strengthening/ROM; Elevations; Therapeutic exercise; Endurance training;Patient/family training;Equipment eval/education; Bed mobility;Gait training; Compensatory technique education   PT Frequency Other (Comment)  (5 times per week)   Recommendation   PT Discharge Recommendation Post acute rehabilitation services  (to Long term care)   -[facility] Basic Mobility Inpatient   Turning in Bed Without Bedrails 3   Lying on Back to Sitting on Edge of Flat Bed 3   Moving Bed to Chair 3   Standing Up From Chair 3   Walk in Room 2   Climb 3-5 Stairs 2   Basic Mobility Inpatient Raw Score 16   Basic Mobility Standardized Score 38 32   Highest Level Of Mobility   JH-HL Goal 5: Stand one or more mins   JH-HLM Highest Level of Mobility 5: Stand (1 or more minutes)   -HLM Goal Achieved Yes   Barthel Index   Feeding 5   Bathing 0   Grooming Score 0   Dressing Score 5   Bladder Score 10   Bowels Score 10   Toilet Use Score 5   Transfers (Bed/Chair) Score 10   Mobility (Level Surface) Score 0   Stairs Score 0   Barthel Index Score 45   Additional Treatment Session   Start Time 1430   End Time 1445   Treatment Assessment S:  10/10 LS pain O:  Bilateral lower extremity exercise completed sitting on bed edge as listed below, standing balance activity attempted as tolerated a:  Patient will benefit from continued physical therapy with progression as tolerated and increasing functional mobility   Exercises   Glute Sets Sitting;5 reps;Bilateral   Knee AROM Long Arc Quad Sitting;10 reps;Bilateral   Ankle Pumps Sitting;10 reps;Bilateral   Balance training  sidestepping as tolerated   Licensure   [place] License Number  Cheryal Dubin PT [id number]

## 2022-04-25 NOTE — CASE MANAGEMENT
Case Management Assessment & Discharge Planning Note    Patient name Ernestina Duverney  Location 3 [facility] 321/3 3024 Mylene Bailon-* MRN [id number]  : 1960 Date 2022       Current Admission Date: 2022  Current Admission Diagnosis:Back pain   Patient Active Problem List    Diagnosis Date Noted    Chronic diastolic CHF     Chronic pain syndrome 2022    Foraminal stenosis of lumbar region 2022    Lumbar post-laminectomy syndrome 2022    Lumbar radiculopathy 2022    Shortness of breath 2022    SIRS (systemic inflammatory response syndrome) ([facility] ) 2022    Dysuria 2021    Peripheral neuropathy 2021    Chronic left-sided low back pain with left-sided sciatica 2021    BMI 40 0-44 9, adult ([address] ) 2021    Muscle weakness (generalized) 2021    Platelets decreased ([facility] ) 2021    Medicare annual wellness visit, subsequent 2021    Chronic congestive heart failure ([address] ) 2020    Leukocytosis 10/29/2020    Hyperlipidemia 10/29/2020    Nutritional anemia, unspecified  10/29/2020    Chest pain 10/11/2020    Simple chronic bronchitis ([address] ) 10/11/2020    Atrial fibrillation 2020    Hyperglycemia 2020    Transition of care performed with sharing of clinical summary 2020    Obstructive sleep apnea 2020    Obesity (BMI 30-39 9) 2020    Stage 2 chronic kidney disease 2020    Hyponatremia 2020    COPD (chronic obstructive pulmonary disease) ([address] ) 2020    Chronic a-fib ([address] ) 2020    H/O vitamin D deficiency 10/28/2019    Dyslipidemia 2019    Type 2 diabetes mellitus with complication, with long-term current use of insulin ([address] ) 2019    Restrictive ventilatory defect 2019    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) 2019    Lung disease, restrictive 2018    Chronic respiratory failure with hypoxia ([facility] ) 10/31/2018    Coronary artery disease 09/06/2017    Esophageal reflux 09/06/2017    Back pain 11/30/2016    SHAVONNE and COPD overlap syndrome      Morbid obesity      Insulin-dependent diabetes mellitus with neuropathy 09/02/2016    Fatigue 09/02/2016    GERD 06/08/2016    Cough 01/13/2016    COPD with exacerbation ([facility] ) 01/13/2016    Psychiatric disorder     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/25/2015    Erectile dysfunction of organic origin 08/25/2014    Essential hypertension 09/04/2012    Diabetic neuropathy ([facility] ) 09/04/2012    Panic disorder without agoraphobia 09/04/2012    Vitamin D deficiency 09/04/2012      LOS (days): 0  Geometric Mean LOS (GMLOS) (days):   Days to GMLOS:     OBJECTIVE:         Current admission status: Observation  Referral Reason: SNF, Initial    Preferred Pharmacy:   [address] [place] [id number]  Phone: [phone] Fax: [phone]    Primary Care Provider: Kaden Salas MD    Primary Insurance: [facility]  Secondary Insurance: [facility]    ASSESSMENT:  Jess Burns [employer]     [place], [address] Representative - Friend   Primary Phone: [phone] (Mobile)            Readmission Root Cause  30 Day Readmission: Yes  Who directed you to return to the hospital?: Self  Did you understand whom to contact if you had questions or problems?: Yes  Did you get your prescriptions before you left the hospital?: No  Reason[de-identified] Not preferred pharmacy  Were you able to get your prescriptions filled when you left the hospital?: Yes  Did you take your medications as prescribed?: Yes  Were you able to get to your follow-up appointments?: No  Reason[de-identified] Readmitted prior to appointment  During previous admission, was a post-acute recommendation made?: Yes  What post-acute resources were offered?: Grisel Molina  Patient was readmitted due to: Back Pain;  Inability to care for self  Action Plan: LTC placement at SNF    Patient Information  Admitted from[de-identified] Home  Mental Status: Alert  During Assessment patient was accompanied by: Not accompanied during assessment  Assessment information provided by[de-identified] Patient  Primary Caregiver: Self  Support Systems: [facility] of Residence: [address] [place] [address] do you live in?: [address] 45 entry access options  Select all that apply : Elevator  Type of Current Residence: Apartment  Floor Level: 2  Upon entering residence, is there a bedroom on the main floor (no further steps)?: Yes  Upon entering residence, is there a bathroom on the main floor (no further steps)?: Yes  In the last 12 months, was there a time when you were not able to pay the mortgage or rent on time?: No  In the last 12 months, how many places have you lived?: 1  In the last 12 months, was there a time when you did not have a steady place to sleep or slept in a shelter (including now)?: No  Homeless/housing insecurity resource given?: N/A  Living Arrangements: Lives Alone    Activities of Daily Living Prior to Admission  Functional Status: Independent  Completes ADLs independently?: Yes  Ambulates independently?: Yes  Does patient use assisted devices?: Yes  Assisted Devices (DME) used: Walker,Electric wheelchair,Home Oxygen concentrator,Portable Oxygen tanks,Nebulizer  DME Company Name (respiratory supplies):  [employer]  O2 Rate(s): 3L  Does patient have a history of Outpatient Therapy (PT/OT)?: No  Does the patient have a history of Short-Term Rehab?: Yes (Hx at [facility])  Does patient have a history of HHC?: Yes  Does patient currently have [address]?: Yes    [address][de-identified] [facility]    Patient Information Continued  Income Source: SSI/SSD  Does patient have prescription coverage?: Yes  Within the past 12 months, you worried that your food would run out before you got the money to buy more : Never true  Within the past 12 months, the food you bought just didnt last and you didnt have money to get more : Never true  Food insecurity resource given?: N/A  Does patient receive dialysis treatments?: No  Does patient have a history of substance abuse?: No  Does patient have a history of Mental Health Diagnosis?: Yes (Depression/Anxiety)  Has patient received inpatient treatment related to mental health in the last 2 years?: No     Means of Transportation  Means of Transport to [facility][de-identified] [address] [place] [address]  In the past 12 months, has lack of transportation kept you from medical appointments or from getting medications?: No  In the past 12 months, has lack of transportation kept you from meetings, work, or from getting things needed for daily living?: No  Was application for public transport provided?: N/A    DISCHARGE DETAILS:    Discharge planning discussed with[de-identified] Patient  Freedom of Choice: Yes  Comments - Freedom of Choice: Patient's only choice is for LTC SNF admission to [address] [place] [address] contacted family/caregiver?: No- see comments (Patient declined   Prefers to communicate d/c plans himself )  Were Treatment Team discharge recommendations reviewed with patient/caregiver?: Yes  Did patient/caregiver verbalize understanding of patient care needs?: Yes  Were patient/caregiver advised of the risks associated with not following Treatment Team discharge recommendations?: Yes    Contacts  Patient Contacts: Kylee Gentile  Relationship to Patient[de-identified] Friend  Contact Method: Phone  Phone Number: [phone]  Reason/Outcome: Emergency [address]         Is the patient interested in [facility] at discharge?: No    DME Referral Provided  Referral made for DME?: No    Other Referral/Resources/Interventions Provided:  Interventions: SNF    Would you like to participate in our [address] service program?  : No - Declined    Treatment Team Recommendation: SNF  Discharge Destination Plan[de-identified] SNF  Transport at Discharge : BLS Ambulance    Patient was last admitted to [address] [place] [address] 4/17-4/21/22 for evaluation of back pain and ambulatory dysfunction  Patient wanted to go to SNF for STR; however, insurance denied auth even with peer to peer review  Patient discharged home / [facility]  Patient came back to [address] [place] [address] ED on 4/24 with complaint of back pain (not changed from usual pain level) and fluctuating blood sugars  He was discharged home  Patient came back to [address] [place] [address] ED later same day now complaining of severe back pain and inability to care for self  Patient requesting to be admitted to SNF for LTC  Patient was admitted under observation  SW met with patient to discuss  Patient states that he only wants to be admitted to LTC at [address] [place] [address] referral sent and accepted  SW will f/u with admission authorization once PT/OT evals are completed

## 2022-04-25 NOTE — ASSESSMENT & PLAN NOTE
Patient has chronic hyponatremia, discharged at 127 on 4/21  Initial sodium was 119, improved to 124 approx 90 mins later with no intervention - initial specimen was hemolyzed with some spurious results, question validity of initial sample  Repeat BMP in AM  Send urine and serum studies  Consider nephrology consult

## 2022-04-25 NOTE — ED NOTES
Patient awake and provided with requested water  No acute signs of distress at this time        Mariluz Kessler, MARY ANN  04/25/22 0284
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
## 2022-04-26 ENCOUNTER — TELEPHONE (OUTPATIENT)
Dept: FAMILY MEDICINE CLINIC | Facility: CLINIC | Age: 62
End: 2022-04-26

## 2022-04-26 LAB
ANION GAP SERPL CALCULATED.3IONS-SCNC: 11 MMOL/L (ref 4–13)
BUN SERPL-MCNC: 28 MG/DL (ref 5–25)
CALCIUM SERPL-MCNC: 8.3 MG/DL (ref 8.3–10.1)
CHLORIDE SERPL-SCNC: 89 MMOL/L (ref 100–108)
CO2 SERPL-SCNC: 24 MMOL/L (ref 21–32)
CREAT SERPL-MCNC: 1.41 MG/DL (ref 0.6–1.3)
ERYTHROCYTE [DISTWIDTH] IN BLOOD BY AUTOMATED COUNT: 13.1 % (ref 11.6–15.1)
GFR SERPL CREATININE-BSD FRML MDRD: 53 ML/MIN/1.73SQ M
GLUCOSE SERPL-MCNC: 135 MG/DL (ref 65–140)
GLUCOSE SERPL-MCNC: 141 MG/DL (ref 65–140)
GLUCOSE SERPL-MCNC: 180 MG/DL (ref 65–140)
GLUCOSE SERPL-MCNC: 185 MG/DL (ref 65–140)
GLUCOSE SERPL-MCNC: 224 MG/DL (ref 65–140)
GLUCOSE SERPL-MCNC: 232 MG/DL (ref 65–140)
GLUCOSE SERPL-MCNC: 55 MG/DL (ref 65–140)
HCT VFR BLD AUTO: 39.2 % (ref 36.5–49.3)
HGB BLD-MCNC: 13 G/DL (ref 12–17)
MCH RBC QN AUTO: 30.5 PG (ref 26.8–34.3)
MCHC RBC AUTO-ENTMCNC: 33.2 G/DL (ref 31.4–37.4)
MCV RBC AUTO: 92 FL (ref 82–98)
OSMOLALITY UR: 367 MMOL/KG
PLATELET # BLD AUTO: 216 THOUSANDS/UL (ref 149–390)
PMV BLD AUTO: 8.8 FL (ref 8.9–12.7)
POTASSIUM SERPL-SCNC: 4.5 MMOL/L (ref 3.5–5.3)
RBC # BLD AUTO: 4.26 MILLION/UL (ref 3.88–5.62)
SODIUM 24H UR-SCNC: 15 MOL/L
SODIUM SERPL-SCNC: 124 MMOL/L (ref 136–145)
SODIUM SERPL-SCNC: 124 MMOL/L (ref 136–145)
SODIUM SERPL-SCNC: 125 MMOL/L (ref 136–145)
WBC # BLD AUTO: 19.22 THOUSAND/UL (ref 4.31–10.16)

## 2022-04-26 PROCEDURE — 82948 REAGENT STRIP/BLOOD GLUCOSE: CPT

## 2022-04-26 PROCEDURE — 85027 COMPLETE CBC AUTOMATED: CPT | Performed by: INTERNAL MEDICINE

## 2022-04-26 PROCEDURE — 80048 BASIC METABOLIC PNL TOTAL CA: CPT | Performed by: INTERNAL MEDICINE

## 2022-04-26 PROCEDURE — 94640 AIRWAY INHALATION TREATMENT: CPT

## 2022-04-26 PROCEDURE — 81450 HL NEO GSAP 5-50DNA/DNA&RNA: CPT | Performed by: PHYSICIAN ASSISTANT

## 2022-04-26 PROCEDURE — 88373 M/PHMTRC ALYS ISHQUANT/SEMIQ: CPT | Performed by: PHYSICIAN ASSISTANT

## 2022-04-26 PROCEDURE — 94660 CPAP INITIATION&MGMT: CPT

## 2022-04-26 PROCEDURE — 99221 1ST HOSP IP/OBS SF/LOW 40: CPT | Performed by: PHYSICIAN ASSISTANT

## 2022-04-26 PROCEDURE — 88185 FLOWCYTOMETRY/TC ADD-ON: CPT

## 2022-04-26 PROCEDURE — 84295 ASSAY OF SERUM SODIUM: CPT | Performed by: INTERNAL MEDICINE

## 2022-04-26 PROCEDURE — 99232 SBSQ HOSP IP/OBS MODERATE 35: CPT | Performed by: INTERNAL MEDICINE

## 2022-04-26 PROCEDURE — 81263 IGH VARI REGIONAL MUTATION: CPT | Performed by: PHYSICIAN ASSISTANT

## 2022-04-26 PROCEDURE — 88184 FLOWCYTOMETRY/ TC 1 MARKER: CPT | Performed by: PHYSICIAN ASSISTANT

## 2022-04-26 PROCEDURE — 88367 INSITU HYBRIDIZATION AUTO: CPT | Performed by: PHYSICIAN ASSISTANT

## 2022-04-26 PROCEDURE — 94760 N-INVAS EAR/PLS OXIMETRY 1: CPT

## 2022-04-26 RX ORDER — LIDOCAINE 50 MG/G
1 PATCH TOPICAL DAILY
Status: DISCONTINUED | OUTPATIENT
Start: 2022-04-27 | End: 2022-04-28 | Stop reason: HOSPADM

## 2022-04-26 RX ORDER — MUSCLE RUB CREAM 100; 150 MG/G; MG/G
CREAM TOPICAL 4 TIMES DAILY PRN
Status: DISCONTINUED | OUTPATIENT
Start: 2022-04-26 | End: 2022-04-28 | Stop reason: HOSPADM

## 2022-04-26 RX ORDER — TIZANIDINE 2 MG/1
4 TABLET ORAL EVERY 8 HOURS PRN
Status: DISCONTINUED | OUTPATIENT
Start: 2022-04-26 | End: 2022-04-28 | Stop reason: HOSPADM

## 2022-04-26 RX ORDER — INSULIN GLARGINE 100 [IU]/ML
60 INJECTION, SOLUTION SUBCUTANEOUS EVERY 12 HOURS SCHEDULED
Status: DISCONTINUED | OUTPATIENT
Start: 2022-04-26 | End: 2022-04-28 | Stop reason: HOSPADM

## 2022-04-26 RX ADMIN — METOPROLOL SUCCINATE 200 MG: 100 TABLET, EXTENDED RELEASE ORAL at 09:28

## 2022-04-26 RX ADMIN — INSULIN GLARGINE 60 UNITS: 100 INJECTION, SOLUTION SUBCUTANEOUS at 21:45

## 2022-04-26 RX ADMIN — SODIUM CHLORIDE 50 ML/HR: 234 INJECTION, SOLUTION, CONCENTRATE INTRAVENOUS at 09:45

## 2022-04-26 RX ADMIN — GABAPENTIN 300 MG: 300 CAPSULE ORAL at 16:17

## 2022-04-26 RX ADMIN — OXYCODONE HYDROCHLORIDE 5 MG: 5 TABLET ORAL at 09:28

## 2022-04-26 RX ADMIN — OXYCODONE HYDROCHLORIDE AND ACETAMINOPHEN 1000 MG: 500 TABLET ORAL at 09:27

## 2022-04-26 RX ADMIN — TRAMADOL HYDROCHLORIDE 50 MG: 50 TABLET ORAL at 04:48

## 2022-04-26 RX ADMIN — ALPRAZOLAM 2 MG: 0.5 TABLET ORAL at 21:44

## 2022-04-26 RX ADMIN — FLUTICASONE FUROATE AND VILANTEROL TRIFENATATE 1 PUFF: 100; 25 POWDER RESPIRATORY (INHALATION) at 09:29

## 2022-04-26 RX ADMIN — LOSARTAN POTASSIUM 50 MG: 50 TABLET, FILM COATED ORAL at 09:28

## 2022-04-26 RX ADMIN — Medication 1000 UNITS: at 09:28

## 2022-04-26 RX ADMIN — SERTRALINE HYDROCHLORIDE 50 MG: 50 TABLET ORAL at 09:28

## 2022-04-26 RX ADMIN — INSULIN LISPRO 35 UNITS: 100 INJECTION, SOLUTION INTRAVENOUS; SUBCUTANEOUS at 11:50

## 2022-04-26 RX ADMIN — OXYCODONE HYDROCHLORIDE 5 MG: 5 TABLET ORAL at 22:05

## 2022-04-26 RX ADMIN — ATORVASTATIN CALCIUM 80 MG: 80 TABLET, FILM COATED ORAL at 09:26

## 2022-04-26 RX ADMIN — GABAPENTIN 300 MG: 300 CAPSULE ORAL at 09:27

## 2022-04-26 RX ADMIN — INSULIN LISPRO 4 UNITS: 100 INJECTION, SOLUTION INTRAVENOUS; SUBCUTANEOUS at 02:21

## 2022-04-26 RX ADMIN — APIXABAN 5 MG: 5 TABLET, FILM COATED ORAL at 18:00

## 2022-04-26 RX ADMIN — GABAPENTIN 300 MG: 300 CAPSULE ORAL at 21:44

## 2022-04-26 RX ADMIN — QUETIAPINE FUMARATE 300 MG: 100 TABLET ORAL at 21:44

## 2022-04-26 RX ADMIN — INSULIN LISPRO 2 UNITS: 100 INJECTION, SOLUTION INTRAVENOUS; SUBCUTANEOUS at 07:30

## 2022-04-26 RX ADMIN — BUSPIRONE HYDROCHLORIDE 10 MG: 10 TABLET ORAL at 09:28

## 2022-04-26 RX ADMIN — BUSPIRONE HYDROCHLORIDE 10 MG: 10 TABLET ORAL at 18:00

## 2022-04-26 RX ADMIN — INSULIN GLARGINE 75 UNITS: 100 INJECTION, SOLUTION SUBCUTANEOUS at 09:24

## 2022-04-26 RX ADMIN — INSULIN LISPRO 35 UNITS: 100 INJECTION, SOLUTION INTRAVENOUS; SUBCUTANEOUS at 07:30

## 2022-04-26 RX ADMIN — ASPIRIN 81 MG: 81 TABLET, COATED ORAL at 09:27

## 2022-04-26 RX ADMIN — APIXABAN 5 MG: 5 TABLET, FILM COATED ORAL at 09:27

## 2022-04-26 RX ADMIN — INSULIN LISPRO 4 UNITS: 100 INJECTION, SOLUTION INTRAVENOUS; SUBCUTANEOUS at 11:49

## 2022-04-26 RX ADMIN — DIGOXIN 250 MCG: 125 TABLET ORAL at 09:27

## 2022-04-26 RX ADMIN — QUETIAPINE FUMARATE 100 MG: 50 TABLET, EXTENDED RELEASE ORAL at 09:32

## 2022-04-26 RX ADMIN — ALBUTEROL SULFATE 2.5 MG: 2.5 SOLUTION RESPIRATORY (INHALATION) at 20:45

## 2022-04-26 RX ADMIN — OXYCODONE HYDROCHLORIDE 5 MG: 5 TABLET ORAL at 18:00

## 2022-04-26 NOTE — PROGRESS NOTES
NEPHROLOGY PROGRESS NOTE   Brittany Ahumada 57 y o  male MRN: [id number]  Unit/Bed#: [address] [place] [address] Encounter: [id number]  Reason for Consult:  Hyponatremia      SUMMARY:    68-year-old male with history of chronic hyponatremia, hypertension, bipolar disorder, presents for back pain, and was found to meet SIRS criteria  Nephrology consult for hyponatremia  ASSESSMENT and PLAN:    Hyponatremia  --acute on chronic  --baseline sodium 129-135  --examined slightly hypovolemic, the setting of decreased oral intake, along with diarrhea, also being maintained on spironolactone  --hold spironolactone at this time to us oral intake improves and diarrhea improved  --concurrent hyperglycemia, stable renal function, low serum chloride, and normal potassium  --admission sodium 119 but repeat was 124 without any intervention, I do not suspect a 119 to be accurate  --started on normal saline yesterday for few hours with no improvement of the sodium remains stable at 124  --serum osmolality noted to be low, 272, consistent with hypoosmolar  --urine studies pending  --currently on Seroquel, which can result in SIADH, and may explain the chronicity of his hyponatremia  --there is also component of pain which can cause an increase in ADH level    --start 1 8% saline at 50 mL/hour for goal correction, of 6 mEq in 24 hours, once sodium greater than 130 we can plan to transition to oral salt tablets     Hypertension  --some degree related to pain  --CT scan shows no evidence of volume overload or pulmonary edema  --examines close to euvolemic but slightly hypovolemic  --ongoing diarrhea  --spironolactone on hold, will plan to hold losartan due to the rise in the creatinine    Elevated creatinine/acute kidney injury  --creatinine lucia to 1 4 mg/dL today  --after being stable on admission  --could be a component of prerenal azotemia  --will start 1 8% saline and monitor renal function closely  --discontinue losartan    SUBJECTIVE / INTERVAL HISTORY:    Still having back pain and diarrhea starting to eat a little bit better    OBJECTIVE:  Current Weight: Weight - Scale: 124 kg (273 lb 13 oz)  Vitals:    04/25/22 2335 04/26/22 0200 04/26/22 0600 04/26/22 0900   BP: 136/72   112/68   BP Location: Left arm   Left arm   Pulse: 82   76   Resp: 18   18   Temp: 98 6 °F (37 °C)   97 9 °F (36 6 °C)   TempSrc: Oral   Oral   SpO2: 95% 95%  98%   Weight:   124 kg (273 lb 13 oz)    Height:           Intake/Output Summary (Last 24 hours) at 4/26/2022 0916  Last data filed at 4/25/2022 2301  Gross per 24 hour   Intake 10 ml   Output 600 ml   Net -590 ml       Review of Systems:    12 point ROS has been reviewed  Physical Exam  Vitals and nursing note reviewed  Constitutional:       General: He is not in acute distress  Appearance: He is well-developed  HENT:      Head: Normocephalic and atraumatic  Eyes:      General: No scleral icterus  Conjunctiva/sclera: Conjunctivae normal       Pupils: Pupils are equal, round, and reactive to light  Cardiovascular:      Rate and Rhythm: Normal rate and regular rhythm  Heart sounds: S1 normal and S2 normal  No murmur heard  No friction rub  No gallop  Pulmonary:      Effort: Pulmonary effort is normal  No respiratory distress  Breath sounds: Normal breath sounds  No wheezing or rales  Abdominal:      General: Bowel sounds are normal       Palpations: Abdomen is soft  Tenderness: There is no abdominal tenderness  There is no rebound  Musculoskeletal:         General: Normal range of motion  Cervical back: Normal range of motion and neck supple  Skin:     Findings: No rash  Neurological:      Mental Status: He is alert and oriented to person, place, and time     Psychiatric:         Behavior: Behavior normal          Medications:    Current Facility-Administered Medications:     acetaminophen (TYLENOL) tablet 650 mg, 650 mg, Oral, Q6H PRN, MANAS Marmolejo    albuterol inhalation solution 2 5 mg, 2 5 mg, Nebulization, Q6H PRN, MANAS Feliciano    ALPRAZolam Dimple Heather) tablet 2 mg, 2 mg, Oral, HS, MANAS Feliciano, 2 mg at 04/25/22 2124    apixaban (ELIQUIS) tablet 5 mg, 5 mg, Oral, BID, MANAS Feliciano, 5 mg at 04/25/22 1807    ascorbic acid (VITAMIN C) tablet 1,000 mg, 1,000 mg, Oral, Daily, MANAS Feliciano, 1,000 mg at 04/25/22 5625    aspirin (ECOTRIN LOW STRENGTH) EC tablet 81 mg, 81 mg, Oral, Daily, MANAS Feliciano, 81 mg at 04/25/22 0934    atorvastatin (LIPITOR) tablet 80 mg, 80 mg, Oral, Daily, MANAS Feliciano, 80 mg at 04/25/22 3819    baclofen tablet 10 mg, 10 mg, Oral, Q8H PRN, MANAS Feliciano    busPIRone (BUSPAR) tablet 10 mg, 10 mg, Oral, BID, MANAS Feliciano, 10 mg at 04/25/22 1807    cholecalciferol (VITAMIN D3) tablet 1,000 Units, 1,000 Units, Oral, Daily, MANAS Feliciano, 1,000 Units at 04/25/22 1581    digoxin (LANOXIN) tablet 250 mcg, 250 mcg, Oral, Daily, MANAS Feliciano, 250 mcg at 04/25/22 0934    fluticasone-vilanterol (BREO ELLIPTA) 100-25 mcg/inh inhaler 1 puff, 1 puff, Inhalation, Daily, MANAS Feliciano, 1 puff at 04/25/22 1017    gabapentin (NEURONTIN) capsule 300 mg, 300 mg, Oral, TID, MANAS Feliciano, 300 mg at 04/25/22 2125    HYDROmorphone (DILAUDID) injection 0 5 mg, 0 5 mg, Intravenous, Q3H PRN, Kait Kovacs MD, 0 5 mg at 04/25/22 2125    insulin glargine (LANTUS) subcutaneous injection 75 Units 0 75 mL, 75 Units, Subcutaneous, Q12H [address], MANAS Feliciano, 75 Units at 04/25/22 2125    insulin lispro (HumaLOG) 100 units/mL subcutaneous injection 2-12 Units, 2-12 Units, Subcutaneous, TID AC, 8 Units at 04/25/22 1125 **AND** Fingerstick Glucose (POCT), , , TID AC, MANAS Feliciano    insulin lispro (HumaLOG) 100 units/mL subcutaneous injection 2-12 Units, 2-12 Units, Subcutaneous, HS, MANAS Feliciano    insulin lispro (HumaLOG) 100 units/mL subcutaneous injection 2-12 Units, 2-12 Units, Subcutaneous, 0200, Ligia Jones, CRNP, 4 Units at 04/26/22 0221    insulin lispro (HumaLOG) 100 units/mL subcutaneous injection 35 Units, 35 Units, Subcutaneous, TID With Meals, Ligia Jones, CRNP, 35 Units at 04/25/22 1652    losartan (COZAAR) tablet 50 mg, 50 mg, Oral, Daily, Thresea Robert, CRNP, 50 mg at 04/25/22 0611    metoprolol succinate (TOPROL-XL) 24 hr tablet 200 mg, 200 mg, Oral, Daily, Thresea Robert, CRNP, 200 mg at 04/25/22 7122    oxyCODONE (ROXICODONE) IR tablet 5 mg, 5 mg, Oral, Q4H PRN, Sandy Huerta MD, 5 mg at 04/25/22 1430    QUEtiapine (SEROquel XR) 24 hr tablet 100 mg, 100 mg, Oral, QAM, Thresea Robert, CRNP, 100 mg at 04/25/22 1023    QUEtiapine (SEROquel) tablet 300 mg, 300 mg, Oral, HS, Thresea Robert, CRNP, 300 mg at 04/25/22 2125    sertraline (ZOLOFT) tablet 50 mg, 50 mg, Oral, Daily, Thresea Robert, CRNP, 50 mg at 04/25/22 6982    sodium chloride (concentrated) 308 mEq in sterile water 1,000 mL infusion, , Intravenous, Continuous, Mera Deal MD    traMADol (ULTRAM) tablet 50 mg, 50 mg, Oral, Q6H PRN, Sandy Huerta MD, 50 mg at 04/26/22 0448    Laboratory Results:  Results from last 7 days   Lab Units 04/26/22  0445 04/25/22  0614 04/25/22  0136 04/25/22  0009 04/21/22  0621 04/20/22  0538   WBC Thousand/uL 19 22* 22 42* 23 88* 26 13* 10 44* 11 14*   HEMOGLOBIN g/dL 13 0 15 4 14 3 15 5 12 7 12 4   HEMATOCRIT % 39 2 44 4 40 8 42 9 36 6 37 0   PLATELETS Thousands/uL 216 271 277 277 169 175   POTASSIUM mmol/L 4 5 4 9 4 4 6 0* 4 1 4 0   CHLORIDE mmol/L 89* 87* 86* 84* 91* 91*   CO2 mmol/L 24 26 26 27 27 27   BUN mg/dL 28* 13 14 14 16 17   CREATININE mg/dL 1 41* 0 84 0 82 0 69 0 85 0 97   CALCIUM mg/dL 8 3 9 0 9 0 9 3 9 2 8 8   MAGNESIUM mg/dL  --  2 2  --   --  1 8 1 5*       PLEASE NOTE:  This encounter was completed utilizing the M- Modal[employer]Fluency Direct Speech Voice Recognition Software   Grammatical errors, random word insertions, pronoun errors and incomplete sentences are occasional consequences of the system due to software limitations, ambient noise and hardware issues  These may be missed by proof reading prior to affixing electronic signature  Any questions or concerns about the content, text or information contained within the body of this dictation should be directly addressed to the physician for clarification  Please do not hesitate to call me directly if you have any any questions or concerns

## 2022-04-26 NOTE — UTILIZATION REVIEW
Notification of Discharge   This is a Notification of Discharge from our facility [address]  Please be advised that this patient has been discharge from our facility  Below you will find the admission and discharge date and time including the patients disposition  UTILIZATION REVIEW CONTACT:  Toshia Baig  Utilization   Network Utilization Review Department  Phone: [phone] x carefully listen to the prompts  All voicemails are confidential   Email: [email or web address]     PHYSICIAN ADVISORY SERVICES:  FOR [id number] REVIEW - MEDICAL NECESSITY DENIAL  Phone: [phone]  Fax: [phone]  Email: [email or web address]     PRESENTATION DATE: 4/17/2022  2:20 PM  OBERVATION ADMISSION DATE:   INPATIENT ADMISSION DATE: 4/17/22  5:16 PM   DISCHARGE DATE: 4/21/2022  9:00 PM  DISPOSITION: Home/Self Care Home/Self Care      IMPORTANT INFORMATION:  Send all requests for admission clinical reviews, approved or denied determinations and any other requests to dedicated fax number below belonging to the campus where the patient is receiving treatment   List of dedicated fax numbers:  [address] DENIALS (Administrative/Medical Necessity) [phone]   [address] (Maternity/NICU/Pediatrics) [phone]   [facility] [phone]   [address] [facility] [phone]   [address] [place] [address] [phone]   2000 [facility] [address] [place] [address],[address] [phone]   [facility]  [phone]   [address] [facility]  [address] [facility] [address] [facility] [phone]
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
## 2022-04-26 NOTE — ASSESSMENT & PLAN NOTE
Patient presented to ED with complaints of back pain  He states he wanted to go to rehab on discharge but was declined by insurance  He continues to have pain  He has had no improvement with his injections  Patient has spoken with his pain management doctor - no further recommendations  Generalized weakness, no focal deficit  No loss of bowel or bladder  · CT lumbar spine revealed noncompressive thoracolumbar degenerative changes, no acute osseous abnormality  · Continue pain control with lidocaine patch oxycodone 5 mg q 4 hours p r n , tramadol 50 mg q 6 hours p r n , hydromorphone 0 5 mg IV q 2 hours p r n    ·  will change baclofen to tizanidine  · PT/OT/recommended rehab

## 2022-04-26 NOTE — PROGRESS NOTES
Phan [address]  Progress Note Cee Crowe 1960, 57 y o  male MRN: [id number]  Unit/Bed#: [address] [place] [address] Encounter: [id number]  Primary Care Provider: Jada Goncalves MD   Date and time admitted to hospital: 4/24/2022 11:10 PM    * Back pain  Assessment & Plan  Patient presented to ED with complaints of back pain  He states he wanted to go to rehab on discharge but was declined by insurance  He continues to have pain  He has had no improvement with his injections  Patient has spoken with his pain management doctor - no further recommendations  Generalized weakness, no focal deficit  No loss of bowel or bladder  · CT lumbar spine revealed noncompressive thoracolumbar degenerative changes, no acute osseous abnormality  · Continue pain control with lidocaine patch oxycodone 5 mg q 4 hours p r n , tramadol 50 mg q 6 hours p r n , hydromorphone 0 5 mg IV q 2 hours p r n  ·  will change baclofen to tizanidine  · PT/OT/recommended rehab    Chronic diastolic CHF  Assessment & Plan  Wt Readings from Last 3 Encounters:   04/26/22 124 kg (273 lb 13 oz)   04/24/22 125 kg (275 lb 9 2 oz)   04/17/22 124 kg (273 lb 11 2 oz)   · Most recent echo revealed an EF of 55% with by atrial dilatation, mild pulmonary regurg, no regional wall motion abnormalities  · Continue digoxin and Lopressor  · Will hold Demadex and Aldactone given increased creatinine  · Monitor intake and output  · Daily weights    Chronic pain syndrome  Assessment & Plan  · Continue gabapentin, toradol     SIRS (systemic inflammatory response syndrome) ([facility] [address] )  Assessment & Plan  As evidenced by tachycardia, leukocytosis  Differential unavailable - sent to pathologist for review  No evidence of active infection  Lactic negative  CT chest, abdomen and pelvis with possible colitis, however, patient has no abdominal pain, diarrhea      · Follow up differential   · Urinalysis pending   · Follow up blood cultures   · Consider MRI of lumbar spine depending on clinical course     Leukocytosis  Assessment & Plan  · Pathology with atypical lymphocytes  · Hematology input will be appreciated    Atrial fibrillation  Assessment & Plan  · Continue digoxin, toprol and eliquis    Hyponatremia  Assessment & Plan  · Patient has chronic hyponatremia 129-135 discharged at 127 on 4/21  · Initial sodium was 119, improved to 124 approx 90 mins later with no intervention - initial specimen was hemolyzed with some spurious results, question validity of initial sample  · Nephrology consultation appreciated  · Patient with poor oral intake, diarrhea  · Patient started on 1 8% saline at 50 mL/hour for goal correction of 6 mEq in 24 hours  · Once patient sodium is greater than 130, nephrology planning to transition to oral salt tablets  · Monitor serial BMP    GERD  Assessment & Plan  · Continue PPI    Essential hypertension  Assessment & Plan  · Continue metoprolol  · Losartan on hold due to acute kidney injury    Coronary artery disease  Assessment & Plan  · Continue aspirin, lipitor, lopressor, losartan     ANA LILIA (acute kidney injury) ([facility] [address] )  Assessment & Plan  · Creatinine increased to 1 4  · Possible prerenal  · Will discontinue NSAIDs and losartan  · Mitral function    Morbid obesity   Assessment & Plan  · Dietary and lifestyle modifications     SHAVONNE and COPD overlap syndrome   Assessment & Plan  · Continue BiPAP    Insulin-dependent diabetes mellitus with neuropathy  Assessment & Plan  Lab Results   Component Value Date    HGBA1C 8 6 (H) 03/25/2022       Recent Labs     04/25/22  1623 04/25/22  2135 04/26/22  0210 04/26/22  0739   POCGLU 147* 76 232* 185*       Blood Sugar Average: Last 72 hrs:  (P) 417 [id number]   · continue Lantus 75 units b i d   · Accu-Cheks a c  HS with insulin sliding scale    Psychiatric disorder  Assessment & Plan  · Continue zoloft, seroquel, buspar, xanax           VTE Pharmacologic Prophylaxis:   Pharmacologic: Eliquis    Patient Centered Rounds: I have performed bedside rounds with nursing staff today  Education and Discussions with Family / Patient:  Patient    Time Spent for Care: 20 minutes  More than 50% of total time spent on counseling and coordination of care as described above  Current Length of Stay: 1 day(s)    Current Patient Status: Inpatient   Certification Statement: The patient will continue to require additional inpatient hospital stay due to Intractable back pain    Discharge Plan / Estimated Discharge Date: TBD    Code Status: Level 1 - Full Code      Subjective:   Patient seen and examined at bedside, still complaining of lumbar back pain, denies any nausea, vomiting, chest pain    Objective:     Vitals:   Temp (24hrs), Av 1 °F (36 7 °C), Min:97 9 °F (36 6 °C), Max:98 6 °F (37 °C)    Temp:  [97 9 °F (36 6 °C)-98 6 °F (37 °C)] 97 9 °F (36 6 °C)  HR:  [73-84] 76  Resp:  [18-20] 18  BP: (112-136)/(63-72) 112/68  SpO2:  [95 %-98 %] 98 %  Body mass index is 38 19 kg/m²  Input and Output Summary (last 24 hours): Intake/Output Summary (Last 24 hours) at 2022 1605  Last data filed at 2022 1001  Gross per 24 hour   Intake --   Output 1600 ml   Net -1600 ml       Physical Exam:    Constitutional: Patient is oriented to person, place and time, no acute distress  HEENT:  Normocephalic, atraumatic  Cardiovascular: Normal S1S2, RRR, No murmurs/rubs/gallops appreciated  Pulmonary:  Bilateral air entry, No rhonchi/rales/wheezing appreciated  Abdominal: Soft, Bowel sounds present, Non-tender, Non-distended  Extremities:  No cyanosis, clubbing or edema     Neurological:  Moving all extremities spontaneously, no numbness or weakness  Skin:  Warm, dry    Additional Data:     Labs:    Results from last 7 days   Lab Units 22  0445 22  0136 22  0009 22  0621 22  0621   WBC Thousand/uL 19 22*   < > 26 13*   < > 10 44*   HEMOGLOBIN g/dL 13 0   < > 15 5   < > 12 7 HEMATOCRIT % 39 2   < > 42 9   < > 36 6   PLATELETS Thousands/uL 216   < > 277   < > 169   NEUTROS PCT %  --   --   --   --  37*   LYMPHS PCT %  --   --   --   --  57*   LYMPHO PCT %  --   --  40  --   --    MONOS PCT %  --   --   --   --  5   MONO PCT %  --   --  5  --   --    EOS PCT %  --   --   --   --  1    < > = values in this interval not displayed  Results from last 7 days   Lab Units 04/26/22  0445 04/25/22  0614 04/25/22  0136   POTASSIUM mmol/L 4 5   < > 4 4   CHLORIDE mmol/L 89*   < > 86*   CO2 mmol/L 24   < > 26   BUN mg/dL 28*   < > 14   CREATININE mg/dL 1 41*   < > 0 82   CALCIUM mg/dL 8 3   < > 9 0   ALK PHOS U/L  --   --  50   ALT U/L  --   --  50   AST U/L  --   --  34    < > = values in this interval not displayed  Results from last 7 days   Lab Units 04/25/22  0009   INR  0 99        I Have Reviewed All Lab Data Listed Above  Recent Cultures (last 7 days):     Results from last 7 days   Lab Units 04/25/22  0136 04/25/22  0134   BLOOD CULTURE  No Growth at 24 hrs  No Growth at 24 hrs         Last 24 Hours Medication List:   Current Facility-Administered Medications   Medication Dose Route Frequency Provider Last Rate    acetaminophen  650 mg Oral Q6H PRN Valeria Kaia, CRNP      albuterol  2 5 mg Nebulization Q6H PRN Valeria Kaia, CRNP      ALPRAZolam  2 mg Oral HS Valeria Kaia, CRNP      apixaban  5 mg Oral BID Valeria Kaia, CRNP      vitamin C  1,000 mg Oral Daily Valeria Kaia, CRNP      aspirin  81 mg Oral Daily Valeria Kaia, CRNP      atorvastatin  80 mg Oral Daily Valeria Kaia, CRNP      busPIRone  10 mg Oral BID Valeria Kaia, CRNP      cholecalciferol  1,000 Units Oral Daily Valeria Kaia, CRNP      digoxin  250 mcg Oral Daily Valeria Kaia, CRNP      fluticasone-vilanterol  1 puff Inhalation Daily Valeria Kaia, CRNP      gabapentin  300 mg Oral TID Valeria Kaia, CRNP      HYDROmorphone  0 5 mg Intravenous Q3H PRN Kait Kovacs MD      insulin glargine  75 Units Subcutaneous Q12H [facility] MANA[facility] Feliciano      insulin lispro  2-12 Units Subcutaneous TID AC MANAS Feliciano      insulin lispro  2-12 Units Subcutaneous HS MANAS Feliciano      insulin lispro  2-12 Units Subcutaneous 0200 MANAS Feliciano      insulin lispro  35 Units Subcutaneous TID With Meals MANAS Feliciano      [START ON 4/27/2022] lidocaine  1 patch Topical Daily Kait Kovacs MD      menthol-methyl salicylate   Apply externally 4x Daily PRN Kait Kovacs MD      metoprolol succinate  200 mg Oral Daily MANAS Feliciano      oxyCODONE  5 mg Oral Q4H PRN Kait Kovacs MD      QUEtiapine  100 mg Oral QAM MANAS Feliciano      QUEtiapine  300 mg Oral HS MANAS Feliciano      sertraline  50 mg Oral Daily MANAS Feliciano      IV infusion builder  50 mL/hr Intravenous Continuous Edward Mcdonough MD 50 mL/hr (04/26/22 0945)    tiZANidine  4 mg Oral Q8H PRN Kait Kovacs MD      traMADol  50 mg Oral Q6H PRN Kait Kovacs MD          Today, Patient Was Seen By: Kait Kovacs MD

## 2022-04-26 NOTE — PLAN OF CARE
Problem: PAIN - ADULT  Goal: Verbalizes/displays adequate comfort level or baseline comfort level  Description: Interventions:  - Encourage patient to monitor pain and request assistance  - Assess pain using appropriate pain scale  - Administer analgesics based on type and severity of pain and evaluate response  - Implement non-pharmacological measures as appropriate and evaluate response  - Consider cultural and social influences on pain and pain management  - Notify physician/advanced practitioner if interventions unsuccessful or patient reports new pain  Outcome: Progressing     Problem: INFECTION - ADULT  Goal: Absence or prevention of progression during hospitalization  Description: INTERVENTIONS:  - Assess and monitor for signs and symptoms of infection  - Monitor lab/diagnostic results  - Monitor all insertion sites, i e  indwelling lines, tubes, and drains  - Monitor endotracheal if appropriate and nasal secretions for changes in amount and color  - [place] appropriate cooling/warming therapies per order  - Administer medications as ordered  - Instruct and encourage patient and family to use good hand hygiene technique  - Identify and instruct in appropriate isolation precautions for identified infection/condition  Outcome: Progressing  Goal: Absence of fever/infection during neutropenic period  Description: INTERVENTIONS:  - Monitor WBC    Outcome: Progressing     Problem: SAFETY ADULT  Goal: Patient will remain free of falls  Description: INTERVENTIONS:  - Educate patient/family on patient safety including physical limitations  - Instruct patient to call for assistance with activity   - Consult OT/PT to assist with strengthening/mobility   - Keep Call bell within reach  - Keep bed low and locked with side rails adjusted as appropriate  - Keep care items and personal belongings within reach  - Initiate and maintain comfort rounds  - Make Fall Risk Sign visible to staff  - Offer Toileting every 2 Hours, in advance of need  - Initiate/Maintain bed alarm  - Obtain necessary fall risk management equipment: call bell   - Apply yellow socks and bracelet for high fall risk patients  - Consider moving patient to room near nurses station  Outcome: Progressing  Goal: Maintain or return to baseline ADL function  Description: INTERVENTIONS:  -  Assess patient's ability to carry out ADLs; assess patient's baseline for ADL function and identify physical deficits which impact ability to perform ADLs (bathing, care of mouth/teeth, toileting, grooming, dressing, etc )  - Assess/evaluate cause of self-care deficits   - Assess range of motion  - Assess patient's mobility; develop plan if impaired  - Assess patient's need for assistive devices and provide as appropriate  - Encourage maximum independence but intervene and supervise when necessary  - Involve family in performance of ADLs  - Assess for home care needs following discharge   - Consider OT consult to assist with ADL evaluation and planning for discharge  - Provide patient education as appropriate  Outcome: Progressing  Goal: Maintains/Returns to pre admission functional level  Description: INTERVENTIONS:  - Perform BMAT or MOVE assessment daily    - Set and communicate daily mobility goal to care team and patient/family/caregiver  - Collaborate with rehabilitation services on mobility goals if consulted  - Perform Range of Motion 3 times a day  - Reposition patient every 3 hours    - Dangle patient 3 times a day  - Stand patient 3 times a day  - Ambulate patient 3 times a day  - Out of bed to chair 3 times a day   - Out of bed for meals 3 times a day  - Out of bed for toileting  - Record patient progress and toleration of activity level   Outcome: Progressing     Problem: DISCHARGE PLANNING  Goal: Discharge to home or other facility with appropriate resources  Description: INTERVENTIONS:  - Identify barriers to discharge w/patient and caregiver  - Arrange for needed discharge resources and transportation as appropriate  - Identify discharge learning needs (meds, wound care, etc )  - Arrange for interpretive services to assist at discharge as needed  - Refer to Case Management Department for coordinating discharge planning if the patient needs post-hospital services based on physician/advanced practitioner order or complex needs related to functional status, cognitive ability, or social support system  Outcome: Progressing     Problem: Knowledge Deficit  Goal: Patient/family/caregiver demonstrates understanding of disease process, treatment plan, medications, and discharge instructions  Description: Complete learning assessment and assess knowledge base  Interventions:  - Provide teaching at level of understanding  - Provide teaching via preferred learning methods  Outcome: Progressing     Problem: MOBILITY - ADULT  Goal: Maintain or return to baseline ADL function  Description: INTERVENTIONS:  -  Assess patient's ability to carry out ADLs; assess patient's baseline for ADL function and identify physical deficits which impact ability to perform ADLs (bathing, care of mouth/teeth, toileting, grooming, dressing, etc )  - Assess/evaluate cause of self-care deficits   - Assess range of motion  - Assess patient's mobility; develop plan if impaired  - Assess patient's need for assistive devices and provide as appropriate  - Encourage maximum independence but intervene and supervise when necessary  - Involve family in performance of ADLs  - Assess for home care needs following discharge   - Consider OT consult to assist with ADL evaluation and planning for discharge  - Provide patient education as appropriate  Outcome: Progressing  Goal: Maintains/Returns to pre admission functional level  Description: INTERVENTIONS:  - Perform BMAT or MOVE assessment daily    - Set and communicate daily mobility goal to care team and patient/family/caregiver     - Collaborate with rehabilitation services on mobility goals if consulted  - Perform Range of Motion 3 times a day  - Reposition patient every 3 hours    - Dangle patient 3 times a day  - Stand patient 3 times a day  - Ambulate patient 3 times a day  - Out of bed to chair 3 times a day   - Out of bed for meals 3 times a day  - Out of bed for toileting  - Record patient progress and toleration of activity level   Outcome: Progressing     Problem: Potential for Falls  Goal: Patient will remain free of falls  Description: INTERVENTIONS:  - Educate patient/family on patient safety including physical limitations  - Instruct patient to call for assistance with activity   - Consult OT/PT to assist with strengthening/mobility   - Keep Call bell within reach  - Keep bed low and locked with side rails adjusted as appropriate  - Keep care items and personal belongings within reach  - Initiate and maintain comfort rounds  - Make Fall Risk Sign visible to staff  - Offer Toileting every 2 Hours, in advance of need  - Initiate/Maintain bed alarm  - Obtain necessary fall risk management equipment: call bell   - Apply yellow socks and bracelet for high fall risk patients  - Consider moving patient to room near nurses station  Outcome: Progressing     Problem: RESPIRATORY - ADULT  Goal: Achieves optimal ventilation and oxygenation  Description: INTERVENTIONS:  - Assess for changes in respiratory status  - Assess for changes in mentation and behavior  - Position to facilitate oxygenation and minimize respiratory effort  - Oxygen administered by appropriate delivery if ordered  - Initiate smoking cessation education as indicated  - Encourage broncho-pulmonary hygiene including cough, deep breathe, Incentive Spirometry  - Assess the need for suctioning and aspirate as needed  - Assess and instruct to report SOB or any respiratory difficulty  - Respiratory Therapy support as indicated  Outcome: Progressing     Problem: Prexisting or High Potential for Compromised Skin Integrity  Goal: Skin integrity is maintained or improved  Description: INTERVENTIONS:  - Identify patients at risk for skin breakdown  - Assess and monitor skin integrity  - Assess and monitor nutrition and hydration status  - Monitor labs   - Assess for incontinence   - Turn and reposition patient  - Assist with mobility/ambulation  - Relieve pressure over bony prominences  - Avoid friction and shearing  - Provide appropriate hygiene as needed including keeping skin clean and dry  - Evaluate need for skin moisturizer/barrier cream  - Collaborate with interdisciplinary team   - Patient/family teaching  - Consider wound care consult   Outcome: Progressing

## 2022-04-26 NOTE — ASSESSMENT & PLAN NOTE
Wt Readings from Last 3 Encounters:   04/26/22 124 kg (273 lb 13 oz)   04/24/22 125 kg (275 lb 9 2 oz)   04/17/22 124 kg (273 lb 11 2 oz)   · Most recent echo revealed an EF of 55% with by atrial dilatation, mild pulmonary regurg, no regional wall motion abnormalities  · Continue digoxin and Lopressor  · Will hold Demadex and Aldactone given increased creatinine  · Monitor intake and output  · Daily weights

## 2022-04-26 NOTE — CASE MANAGEMENT
Case Management Progress Note    Patient name Zhen Vargas  Location [facility] 222-* MRN [id number]  : 1960 Date 2022       LOS (days): 1  Geometric Mean LOS (GMLOS) (days): 2 90  Days to GMLOS:1 8        OBJECTIVE:    Current admission status: Inpatient  Preferred Pharmacy:   [address] [place] [address], [address] [place] [id number]  Phone: [phone] Fax: [phone]    Primary Care Provider: Barbara Rothman MD    Primary Insurance: Brandon Frances MA [facility]  Secondary Insurance:     PROGRESS NOTE:    SW following to assist with DCP  Skilled nursing facility placement, halfway level, is planned  Pt has been accepted by [facility] and bed is available  Authorization request was faxed to [facility] yesterday  SW attempted to follow up with pt's , Ms Marilyn Galloway  Placed call to Ms Marilyn Galloway ([phone]) however voicemail was full and SW was not able to leave message  Email sent to Ms Marilyn Galloway requesting call back to discuss case  SW will continue to follow to assist as needed

## 2022-04-26 NOTE — ASSESSMENT & PLAN NOTE
· Patient has chronic hyponatremia 129-135 discharged at 127 on 4/21  · Initial sodium was 119, improved to 124 approx 90 mins later with no intervention - initial specimen was hemolyzed with some spurious results, question validity of initial sample  · Nephrology consultation appreciated  · Patient with poor oral intake, diarrhea  · Patient started on 1 8% saline at 50 mL/hour for goal correction of 6 mEq in 24 hours  · Once patient sodium is greater than 130, nephrology planning to transition to oral salt tablets  · Monitor serial BMP

## 2022-04-26 NOTE — ASSESSMENT & PLAN NOTE
· Creatinine increased to 1 4  · Possible prerenal  · Will discontinue NSAIDs and losartan  · Mitral function

## 2022-04-26 NOTE — ASSESSMENT & PLAN NOTE
Lab Results   Component Value Date    HGBA1C 8 6 (H) 03/25/2022       Recent Labs     04/25/22  1623 04/25/22  2135 04/26/22  0210 04/26/22  0739   POCGLU 147* 76 232* 185*       Blood Sugar Average: Last 72 hrs:  (P) 351 [id number]   · continue Lantus 75 units b i d   · Accu-Cheks a c  HS with insulin sliding scale

## 2022-04-27 LAB
ANION GAP SERPL CALCULATED.3IONS-SCNC: 9 MMOL/L (ref 4–13)
BUN SERPL-MCNC: 18 MG/DL (ref 5–25)
CALCIUM SERPL-MCNC: 7.6 MG/DL (ref 8.3–10.1)
CHLORIDE SERPL-SCNC: 90 MMOL/L (ref 100–108)
CO2 SERPL-SCNC: 24 MMOL/L (ref 21–32)
CREAT SERPL-MCNC: 1.03 MG/DL (ref 0.6–1.3)
ERYTHROCYTE [DISTWIDTH] IN BLOOD BY AUTOMATED COUNT: 12.9 % (ref 11.6–15.1)
GFR SERPL CREATININE-BSD FRML MDRD: 77 ML/MIN/1.73SQ M
GLUCOSE SERPL-MCNC: 149 MG/DL (ref 65–140)
GLUCOSE SERPL-MCNC: 180 MG/DL (ref 65–140)
GLUCOSE SERPL-MCNC: 220 MG/DL (ref 65–140)
GLUCOSE SERPL-MCNC: 234 MG/DL (ref 65–140)
GLUCOSE SERPL-MCNC: 91 MG/DL (ref 65–140)
GLUCOSE SERPL-MCNC: 93 MG/DL (ref 65–140)
HCT VFR BLD AUTO: 34.6 % (ref 36.5–49.3)
HGB BLD-MCNC: 11.9 G/DL (ref 12–17)
MCH RBC QN AUTO: 31.9 PG (ref 26.8–34.3)
MCHC RBC AUTO-ENTMCNC: 34.4 G/DL (ref 31.4–37.4)
MCV RBC AUTO: 93 FL (ref 82–98)
PLATELET # BLD AUTO: 187 THOUSANDS/UL (ref 149–390)
PMV BLD AUTO: 8.8 FL (ref 8.9–12.7)
POTASSIUM SERPL-SCNC: 4.2 MMOL/L (ref 3.5–5.3)
RBC # BLD AUTO: 3.73 MILLION/UL (ref 3.88–5.62)
SODIUM SERPL-SCNC: 123 MMOL/L (ref 136–145)
SODIUM SERPL-SCNC: 124 MMOL/L (ref 136–145)
SODIUM SERPL-SCNC: 131 MMOL/L (ref 136–145)
WBC # BLD AUTO: 15.95 THOUSAND/UL (ref 4.31–10.16)

## 2022-04-27 PROCEDURE — 99232 SBSQ HOSP IP/OBS MODERATE 35: CPT | Performed by: INTERNAL MEDICINE

## 2022-04-27 PROCEDURE — 84295 ASSAY OF SERUM SODIUM: CPT | Performed by: INTERNAL MEDICINE

## 2022-04-27 PROCEDURE — 82948 REAGENT STRIP/BLOOD GLUCOSE: CPT

## 2022-04-27 PROCEDURE — 94660 CPAP INITIATION&MGMT: CPT

## 2022-04-27 PROCEDURE — 80048 BASIC METABOLIC PNL TOTAL CA: CPT | Performed by: INTERNAL MEDICINE

## 2022-04-27 PROCEDURE — 99232 SBSQ HOSP IP/OBS MODERATE 35: CPT | Performed by: HOSPITALIST

## 2022-04-27 PROCEDURE — 94760 N-INVAS EAR/PLS OXIMETRY 1: CPT

## 2022-04-27 PROCEDURE — 85027 COMPLETE CBC AUTOMATED: CPT | Performed by: INTERNAL MEDICINE

## 2022-04-27 PROCEDURE — 94640 AIRWAY INHALATION TREATMENT: CPT

## 2022-04-27 RX ORDER — TORSEMIDE 10 MG/1
10 TABLET ORAL ONCE
Status: DISCONTINUED | OUTPATIENT
Start: 2022-04-27 | End: 2022-04-27

## 2022-04-27 RX ORDER — ALBUTEROL SULFATE 2.5 MG/3ML
2.5 SOLUTION RESPIRATORY (INHALATION)
Status: DISCONTINUED | OUTPATIENT
Start: 2022-04-27 | End: 2022-04-28 | Stop reason: HOSPADM

## 2022-04-27 RX ADMIN — GABAPENTIN 300 MG: 300 CAPSULE ORAL at 08:55

## 2022-04-27 RX ADMIN — QUETIAPINE FUMARATE 300 MG: 100 TABLET ORAL at 21:24

## 2022-04-27 RX ADMIN — GABAPENTIN 300 MG: 300 CAPSULE ORAL at 21:24

## 2022-04-27 RX ADMIN — INSULIN LISPRO 35 UNITS: 100 INJECTION, SOLUTION INTRAVENOUS; SUBCUTANEOUS at 16:30

## 2022-04-27 RX ADMIN — INSULIN LISPRO 35 UNITS: 100 INJECTION, SOLUTION INTRAVENOUS; SUBCUTANEOUS at 11:47

## 2022-04-27 RX ADMIN — INSULIN GLARGINE 60 UNITS: 100 INJECTION, SOLUTION SUBCUTANEOUS at 21:23

## 2022-04-27 RX ADMIN — BUSPIRONE HYDROCHLORIDE 10 MG: 10 TABLET ORAL at 17:21

## 2022-04-27 RX ADMIN — OXYCODONE HYDROCHLORIDE 5 MG: 5 TABLET ORAL at 04:07

## 2022-04-27 RX ADMIN — Medication 1000 UNITS: at 08:56

## 2022-04-27 RX ADMIN — QUETIAPINE FUMARATE 100 MG: 50 TABLET, EXTENDED RELEASE ORAL at 10:08

## 2022-04-27 RX ADMIN — INSULIN LISPRO 35 UNITS: 100 INJECTION, SOLUTION INTRAVENOUS; SUBCUTANEOUS at 07:33

## 2022-04-27 RX ADMIN — OXYCODONE HYDROCHLORIDE 5 MG: 5 TABLET ORAL at 19:35

## 2022-04-27 RX ADMIN — LIDOCAINE 5% 1 PATCH: 700 PATCH TOPICAL at 08:58

## 2022-04-27 RX ADMIN — ALBUTEROL SULFATE 2.5 MG: 2.5 SOLUTION RESPIRATORY (INHALATION) at 15:10

## 2022-04-27 RX ADMIN — DICLOFENAC SODIUM TOPICAL GEL, 1%, 2 G: 10 GEL TOPICAL at 17:21

## 2022-04-27 RX ADMIN — SERTRALINE HYDROCHLORIDE 50 MG: 50 TABLET ORAL at 08:56

## 2022-04-27 RX ADMIN — OXYCODONE HYDROCHLORIDE 5 MG: 5 TABLET ORAL at 08:58

## 2022-04-27 RX ADMIN — INSULIN GLARGINE 60 UNITS: 100 INJECTION, SOLUTION SUBCUTANEOUS at 08:57

## 2022-04-27 RX ADMIN — ATORVASTATIN CALCIUM 80 MG: 80 TABLET, FILM COATED ORAL at 08:56

## 2022-04-27 RX ADMIN — SODIUM CHLORIDE 50 ML/HR: 234 INJECTION, SOLUTION, CONCENTRATE INTRAVENOUS at 10:08

## 2022-04-27 RX ADMIN — ALBUTEROL SULFATE 2.5 MG: 2.5 SOLUTION RESPIRATORY (INHALATION) at 20:34

## 2022-04-27 RX ADMIN — ASPIRIN 81 MG: 81 TABLET, COATED ORAL at 08:56

## 2022-04-27 RX ADMIN — ALBUTEROL SULFATE 2.5 MG: 2.5 SOLUTION RESPIRATORY (INHALATION) at 08:58

## 2022-04-27 RX ADMIN — GABAPENTIN 300 MG: 300 CAPSULE ORAL at 16:30

## 2022-04-27 RX ADMIN — DICLOFENAC SODIUM TOPICAL GEL, 1%, 2 G: 10 GEL TOPICAL at 13:13

## 2022-04-27 RX ADMIN — INSULIN LISPRO 2 UNITS: 100 INJECTION, SOLUTION INTRAVENOUS; SUBCUTANEOUS at 21:24

## 2022-04-27 RX ADMIN — OXYCODONE HYDROCHLORIDE 5 MG: 5 TABLET ORAL at 14:14

## 2022-04-27 RX ADMIN — ALPRAZOLAM 2 MG: 0.5 TABLET ORAL at 21:22

## 2022-04-27 RX ADMIN — DIGOXIN 250 MCG: 125 TABLET ORAL at 08:56

## 2022-04-27 RX ADMIN — APIXABAN 5 MG: 5 TABLET, FILM COATED ORAL at 17:20

## 2022-04-27 RX ADMIN — FLUTICASONE FUROATE AND VILANTEROL TRIFENATATE 1 PUFF: 100; 25 POWDER RESPIRATORY (INHALATION) at 08:57

## 2022-04-27 RX ADMIN — TRAMADOL HYDROCHLORIDE 50 MG: 50 TABLET ORAL at 07:32

## 2022-04-27 RX ADMIN — INSULIN LISPRO 4 UNITS: 100 INJECTION, SOLUTION INTRAVENOUS; SUBCUTANEOUS at 02:25

## 2022-04-27 RX ADMIN — APIXABAN 5 MG: 5 TABLET, FILM COATED ORAL at 08:56

## 2022-04-27 RX ADMIN — BUSPIRONE HYDROCHLORIDE 10 MG: 10 TABLET ORAL at 08:56

## 2022-04-27 RX ADMIN — OXYCODONE HYDROCHLORIDE AND ACETAMINOPHEN 1000 MG: 500 TABLET ORAL at 08:56

## 2022-04-27 RX ADMIN — DICLOFENAC SODIUM TOPICAL GEL, 1%, 2 G: 10 GEL TOPICAL at 21:37

## 2022-04-27 NOTE — WOUND OSTOMY CARE
Progress Note - Wound   Manual Peers 57 y o  male MRN: [id number]  Unit/Bed#: [address] [place] [address] Encounter: [id number]      Assessment: This is a 57year old male patient admitted on 4/24/22 with back pain  He has a history of IDDM with neuropathy - vsyg7f=7 6, morbid obesity SHAVONNE/COPD & psychiatric disorder  He was awake, alert & oriented x 3 and is partially independent with some ADL's  He is continent of urine & stool  He states that he has a cyst to his right groin that has burst and that he did not mention to Dr Brooke Barron  He states that he has had I&D procedures for them in the past  Dr Brooke Barron was notified by this wound RN & asked if he wanted ID to consult or if he would handle this himself  He stated that he will see the patient regarding this ruptured cyst to right groin  Plan:   1  Apply hydraguard to b/l heels BID and PRN for prevention and protection  2  Apply Calazime to sacrobuttock area 2x/day & prn soilage/dislodgement  3  Cleanse right groin area with foaming cleanser daily & pat dry  Apply Calazime to right groin 2x/day & prn soilage/dislodgment  2  Apply skin nourishing cream the entire skin daily for moisture  3  Turn and reposition patient every  2 hours   4  Elevate heels off of bed with pillows to offload pressure   5  Apply EHOB waffle cushion to chair when OOB, if able               Wound 04/27/22 Groin Right (Active)   Wound Image   04/27/22 1216   Wound Description Granulation tissue;Pink 04/27/22 1216   Wound Length (cm) 1 cm 04/27/22 1216   Wound Width (cm) 2 cm 04/27/22 1216   Wound Depth (cm) 0 1 cm 04/27/22 1216   Wound Surface Area (cm^2) 2 cm^2 04/27/22 1216   Wound Volume (cm^3) 0 2 cm^3 04/27/22 1216   Calculated Wound Volume (cm^3) 0 2 cm^3 04/27/22 1216   Treatments Cleansed-applied Calazime 04/27/22 1216   Wound packed?  No 04/27/22 1216       Wound 04/27/22 Buttocks (Active)   Wound Image  right buttock rash 04/27/22 1217   Drainage Amount None (closed) 04/27/22 1217    Applied moisture barrier   Wound 04/27/22 Heel Left (Active)   Wound Image  blanchable erythema 04/27/22 1226   Aleah-wound Assessment Callus 04/27/22 1226   Wound Length (cm) 4 cm 04/27/22 1226   Wound Width (cm) 8 cm 04/27/22 1226   Wound Surface Area (cm^2) 32 cm^2 04/27/22 1226   Treatments Cleansed 04/27/22 1226   Dressing Moisture barrier 04/27/22 1226       Wound 04/27/22 Heel Right (Active)   Wound Image  blanchable erythema 04/27/22 1228   Wound Length (cm) 4 cm 04/27/22 1228   Wound Width (cm) 6 cm 04/27/22 1228   Wound Surface Area (cm^2) 24 cm^2 04/27/22 1228   Treatments Cleansed 04/27/22 1228   Dressing Moisture barrier 04/27/22 1228     Wound care signing off at this time  Discussed assessment findings, and plan of care/recommendations with Nya Meeks  Recommendations written as orders    Shaji Enrique RN, BSN, [employer]

## 2022-04-27 NOTE — CASE MANAGEMENT
Case Management Progress Note    Patient name Marquis Dickson  Location 3 [facility] MRN [id number]  : 1960 Date 2022       LOS (days): 2  Geometric Mean LOS (GMLOS) (days): 2 90  Days to GMLOS:0 9        OBJECTIVE:    Current admission status: Inpatient  Preferred Pharmacy:   [address] [place] [id number]  Phone: [phone] Fax: [phone]    Primary Care Provider: Alexi Zamorano MD    Primary Insurance: Tammie Landau REP  Secondary Insurance: Nallely MARTINEZ    PROGRESS NOTE:    SW received call from Kim Garcia at [place] with long-term authorization approval ([id number])  SW notified Dr Sonia Terry of approval and ability to transfer to [facility]  Authorization number also given to [facility] admissions  SW will follow to assist with transfer when ordered

## 2022-04-27 NOTE — ASSESSMENT & PLAN NOTE
Wt Readings from Last 3 Encounters:   04/27/22 124 kg (274 lb)   04/24/22 125 kg (275 lb 9 2 oz)   04/17/22 124 kg (273 lb 11 2 oz)   · Most recent echo revealed an EF of 55% with by atrial dilatation, mild pulmonary regurg, no regional wall motion abnormalities  · Continue digoxin and Lopressor  · Holding Demadex and Aldactone  · Monitor intake and output  · Daily weights  · No acute exacerbation

## 2022-04-27 NOTE — NURSING NOTE
Patient reported to primary nurse that he has a boil located in the left groin area, on assessment noted scant amount of blood to area and 3 small elevated scab at the folds of his groin, patient denied pain on tactile assessment  but stated at time it does bother him  Findings were communicated with Rah Gannon, wound team consulted

## 2022-04-27 NOTE — ASSESSMENT & PLAN NOTE
Patient presented to ED with complaints of back pain  He states he wanted to go to rehab on discharge but was declined by insurance  He continues to have pain  He has had no improvement with his injections  Patient has spoken with his pain management doctor - no further recommendations  Generalized weakness, no focal deficit  No loss of bowel or bladder  · CT lumbar spine revealed noncompressive thoracolumbar degenerative changes, no acute osseous abnormality  · Continue pain control with lidocaine patch oxycodone 5 mg q 4 hours p r n , tramadol 50 mg q 6 hours p r n , hydromorphone 0 5 mg IV q 2 hours p r n    ·  will change baclofen to tizanidine  · PT/OT/recommended rehab    - addition of voltaren gel today

## 2022-04-27 NOTE — PLAN OF CARE
Problem: PAIN - ADULT  Goal: Verbalizes/displays adequate comfort level or baseline comfort level  Description: Interventions:  - Encourage patient to monitor pain and request assistance  - Assess pain using appropriate pain scale  - Administer analgesics based on type and severity of pain and evaluate response  - Implement non-pharmacological measures as appropriate and evaluate response  - Consider cultural and social influences on pain and pain management  - Notify physician/advanced practitioner if interventions unsuccessful or patient reports new pain  Outcome: Progressing     Problem: INFECTION - ADULT  Goal: Absence or prevention of progression during hospitalization  Description: INTERVENTIONS:  - Assess and monitor for signs and symptoms of infection  - Monitor lab/diagnostic results  - Monitor all insertion sites, i e  indwelling lines, tubes, and drains  - Monitor endotracheal if appropriate and nasal secretions for changes in amount and color  - [place] appropriate cooling/warming therapies per order  - Administer medications as ordered  - Instruct and encourage patient and family to use good hand hygiene technique  - Identify and instruct in appropriate isolation precautions for identified infection/condition  Outcome: Progressing  Goal: Absence of fever/infection during neutropenic period  Description: INTERVENTIONS:  - Monitor WBC    Outcome: Progressing     Problem: SAFETY ADULT  Goal: Patient will remain free of falls  Description: INTERVENTIONS:  - Educate patient/family on patient safety including physical limitations  - Instruct patient to call for assistance with activity   - Consult OT/PT to assist with strengthening/mobility   - Keep Call bell within reach  - Keep bed low and locked with side rails adjusted as appropriate  - Keep care items and personal belongings within reach  - Initiate and maintain comfort rounds  - Apply yellow socks and bracelet for high fall risk patients  - Consider moving patient to room near nurses station  Outcome: Progressing  Goal: Maintain or return to baseline ADL function  Description: INTERVENTIONS:  -  Assess patient's ability to carry out ADLs; assess patient's baseline for ADL function and identify physical deficits which impact ability to perform ADLs (bathing, care of mouth/teeth, toileting, grooming, dressing, etc )  - Assess/evaluate cause of self-care deficits   - Assess range of motion  - Assess patient's mobility; develop plan if impaired  - Assess patient's need for assistive devices and provide as appropriate  - Encourage maximum independence but intervene and supervise when necessary  - Involve family in performance of ADLs  - Assess for home care needs following discharge   - Consider OT consult to assist with ADL evaluation and planning for discharge  - Provide patient education as appropriate  Outcome: Progressing  Goal: Maintains/Returns to pre admission functional level  Description: INTERVENTIONS:  - Perform BMAT or MOVE assessment daily    - Set and communicate daily mobility goal to care team and patient/family/caregiver     - Collaborate with rehabilitation services on mobility goals if consulted  - Out of bed for toileting  - Record patient progress and toleration of activity level   Outcome: Progressing     Problem: DISCHARGE PLANNING  Goal: Discharge to home or other facility with appropriate resources  Description: INTERVENTIONS:  - Identify barriers to discharge w/patient and caregiver  - Arrange for needed discharge resources and transportation as appropriate  - Identify discharge learning needs (meds, wound care, etc )  - Arrange for interpretive services to assist at discharge as needed  - Refer to Case Management Department for coordinating discharge planning if the patient needs post-hospital services based on physician/advanced practitioner order or complex needs related to functional status, cognitive ability, or social support system  Outcome: Progressing     Problem: Knowledge Deficit  Goal: Patient/family/caregiver demonstrates understanding of disease process, treatment plan, medications, and discharge instructions  Description: Complete learning assessment and assess knowledge base  Interventions:  - Provide teaching at level of understanding  - Provide teaching via preferred learning methods  Outcome: Progressing     Problem: MOBILITY - ADULT  Goal: Maintain or return to baseline ADL function  Description: INTERVENTIONS:  -  Assess patient's ability to carry out ADLs; assess patient's baseline for ADL function and identify physical deficits which impact ability to perform ADLs (bathing, care of mouth/teeth, toileting, grooming, dressing, etc )  - Assess/evaluate cause of self-care deficits   - Assess range of motion  - Assess patient's mobility; develop plan if impaired  - Assess patient's need for assistive devices and provide as appropriate  - Encourage maximum independence but intervene and supervise when necessary  - Involve family in performance of ADLs  - Assess for home care needs following discharge   - Consider OT consult to assist with ADL evaluation and planning for discharge  - Provide patient education as appropriate  Outcome: Progressing  Goal: Maintains/Returns to pre admission functional level  Description: INTERVENTIONS:  - Perform BMAT or MOVE assessment daily    - Set and communicate daily mobility goal to care team and patient/family/caregiver     - Collaborate with rehabilitation services on mobility goals if consulted  - Out of bed for toileting  - Record patient progress and toleration of activity level   Outcome: Progressing     Problem: Potential for Falls  Goal: Patient will remain free of falls  Description: INTERVENTIONS:  - Educate patient/family on patient safety including physical limitations  - Instruct patient to call for assistance with activity   - Consult OT/PT to assist with strengthening/mobility - Keep Call bell within reach  - Keep bed low and locked with side rails adjusted as appropriate  - Keep care items and personal belongings within reach  - Initiate and maintain comfort rounds  - Make Fall Risk Sign visible to staff  - Apply yellow socks and bracelet for high fall risk patients  - Consider moving patient to room near nurses station  Outcome: Progressing     Problem: RESPIRATORY - ADULT  Goal: Achieves optimal ventilation and oxygenation  Description: INTERVENTIONS:  - Assess for changes in respiratory status  - Assess for changes in mentation and behavior  - Position to facilitate oxygenation and minimize respiratory effort  - Oxygen administered by appropriate delivery if ordered  - Initiate smoking cessation education as indicated  - Encourage broncho-pulmonary hygiene including cough, deep breathe, Incentive Spirometry  - Assess the need for suctioning and aspirate as needed  - Assess and instruct to report SOB or any respiratory difficulty  - Respiratory Therapy support as indicated  Outcome: Progressing     Problem: Prexisting or High Potential for Compromised Skin Integrity  Goal: Skin integrity is maintained or improved  Description: INTERVENTIONS:  - Identify patients at risk for skin breakdown  - Assess and monitor skin integrity  - Assess and monitor nutrition and hydration status  - Monitor labs   - Assess for incontinence   - Turn and reposition patient  - Assist with mobility/ambulation  - Relieve pressure over bony prominences  - Avoid friction and shearing  - Provide appropriate hygiene as needed including keeping skin clean and dry  - Evaluate need for skin moisturizer/barrier cream  - Collaborate with interdisciplinary team   - Patient/family teaching  - Consider wound care consult   Outcome: Progressing

## 2022-04-27 NOTE — CASE MANAGEMENT
Case Management Progress Note    Patient name Edi Pulliam  Location [facility] 321/3 3024 Mylene Bailon-* MRN [id number]  : 1960 Date 2022       LOS (days): 2  Geometric Mean LOS (GMLOS) (days): 2 90  Days to GMLOS:1        OBJECTIVE:    Current admission status: Inpatient  Preferred Pharmacy:   [address] [place] [address], [address] [place] [id number]  Phone: [phone] Fax: [phone]    Primary Care Provider: Zoila Lau MD    Primary Insurance: [employer] REP  Secondary Insurance: Julia MARTINEZ    PROGRESS NOTE:    SW following to assist with planning  jail level of care at [facility] is planned pending insurance authorization  MANSI once again attempted to reach , Ms Pauline Pathak  Again no answer and voicemail full  MANSI called Naomie Whitmore, a contact at [facility] from previous hospitalization ([phone]), to request assistance  Naomie Whitmore offered to look into case and assist as able  Shortly thereafter MANSI received call from Bard Du at [facility]  Bard Du requested clinical be faxed to her at [phone]  Clinical was faxed however fax failed  Fax attempted a second time  Message left for Deanna ([phone]) to inform her of fax difficulties  Per Dr Violette [facility] pt is cleared for discharge today  SW will continue to follow

## 2022-04-27 NOTE — PROGRESS NOTES
[facility]  Progress Note Vaibhav Smith 1960, 57 y o  male MRN: [id number]  Unit/Bed#: [address] [place] [address] Encounter: [id number]  Primary Care Provider: Harrison Enrique MD   Date and time admitted to hospital: 4/24/2022 11:10 PM    Chronic diastolic CHF  Assessment & Plan  Wt Readings from Last 3 Encounters:   04/27/22 124 kg (274 lb)   04/24/22 125 kg (275 lb 9 2 oz)   04/17/22 124 kg (273 lb 11 2 oz)   · Most recent echo revealed an EF of 55% with by atrial dilatation, mild pulmonary regurg, no regional wall motion abnormalities  · Continue digoxin and Lopressor  · Holding Demadex and Aldactone  · Monitor intake and output  · Daily weights  · No acute exacerbation     Chronic pain syndrome  Assessment & Plan  · Continue gabapentin, toradol     Leukocytosis  Assessment & Plan  · Pathology with atypical lymphocytes  · Hematology will return tomorrow     Atrial fibrillation  Assessment & Plan  · Continue digoxin, toprol and eliquis    Hyponatremia  Assessment & Plan  · Patient has chronic hyponatremia 129-135 discharged at 127 on 4/21  · Initial sodium was 119, improved to 124 approx 90 mins later with no intervention - initial specimen was hemolyzed with some spurious results, question validity of initial sample  · Nephrology consultation appreciated, now on 1 8% fluid  · Unclear cause?      Coronary artery disease  Assessment & Plan  · Continue aspirin, lipitor, lopressor, losartan     ANA LILIA (acute kidney injury) ([facility] [address] )  Assessment & Plan  · Creatinine increased to 1 4 yesterday,   · 1 03 today  · Continue ivf as per nephrology    SHAVONNE and COPD overlap syndrome   Assessment & Plan  · Continue BiPAP qhs    Insulin-dependent diabetes mellitus with neuropathy  Assessment & Plan  Lab Results   Component Value Date    HGBA1C 8 6 (H) 03/25/2022       Recent Labs     04/26/22  2052 04/27/22  0200 04/27/22  0717 04/27/22  1120   POCGLU 135 220* 149* 93       Blood Sugar Average: Last 72 hrs:  (P) 745 [id number]   · continue Lantus 75 units b i d   · Accu-Cheks a c  HS with insulin sliding scale    Fluctuant, but relatively controlled given 60 of lantus + ssi + 35 with meals     Psychiatric disorder  Assessment & Plan  · Continue zoloft, seroquel, buspar, xanax     * Back pain  Assessment & Plan  Patient presented to ED with complaints of back pain  He states he wanted to go to rehab on discharge but was declined by insurance  He continues to have pain  He has had no improvement with his injections  Patient has spoken with his pain management doctor - no further recommendations  Generalized weakness, no focal deficit  No loss of bowel or bladder  · CT lumbar spine revealed noncompressive thoracolumbar degenerative changes, no acute osseous abnormality  · Continue pain control with lidocaine patch oxycodone 5 mg q 4 hours p r n , tramadol 50 mg q 6 hours p r n , hydromorphone 0 5 mg IV q 2 hours p r n  ·  will change baclofen to tizanidine  · PT/OT/recommended rehab    - addition of voltaren gel today         VTE Pharmacologic Prophylaxis: VTE Score: 5 apixagan        Education and Discussions with Family / Patient: Patient declined call to   Time Spent for Care: 30 minutes  More than 50% of total time spent on counseling and coordination of care as described above      Current Length of Stay: 2 day(s)  Current Patient Status: Inpatient   Certification Statement: The patient will continue to require additional inpatient hospital stay due to hyponatremia  Discharge Plan: when sodium better    Code Status: Level 1 - Full Code    Subjective:   Still with back pain, request V-Gel  Does not believe he has acute exacerbation but would like scheduled breathing treatments      Objective:     Vitals:   Temp (24hrs), Av 3 °F (36 8 °C), Min:98 °F (36 7 °C), Max:98 6 °F (37 °C)    Temp:  [98 °F (36 7 °C)-98 6 °F (37 °C)] 98 3 °F (36 8 °C)  HR:  [55-70] 70  Resp:  [16-18] 18  BP: ()/(54-72) 120/56  SpO2:  [95 %-98 %] 98 %  Body mass index is 38 22 kg/m²  Input and Output Summary (last 24 hours): Intake/Output Summary (Last 24 hours) at 4/27/2022 1446  Last data filed at 4/27/2022 1357  Gross per 24 hour   Intake 780 ml   Output 3425 ml   Net -2645 ml       Physical Exam:   Physical Exam   General:  No acute distress, sitting up in bed  Cardiovascular:  S1, S2, RRR  Pulmonary:  Clear to auscultation bilaterally  Abdomen:  Soft, nontender, nondistended  Neuro/psych:  Alert, oriented, pleasant, movement is slowed when turning     Additional Data:     Labs:  Results from last 7 days   Lab Units 04/27/22 0453 04/25/22 0136 04/25/22  0009 04/21/22  0621 04/21/22  0621   WBC Thousand/uL 15 95*   < > 26 13*   < > 10 44*   HEMOGLOBIN g/dL 11 9*   < > 15 5   < > 12 7   HEMATOCRIT % 34 6*   < > 42 9   < > 36 6   PLATELETS Thousands/uL 187   < > 277   < > 169   BANDS PCT %  --   --  1  --   --    NEUTROS PCT %  --   --   --   --  37*   LYMPHS PCT %  --   --   --   --  57*   LYMPHO PCT %  --   --  40  --   --    MONOS PCT %  --   --   --   --  5   MONO PCT %  --   --  5  --   --    EOS PCT %  --   --   --   --  1    < > = values in this interval not displayed  Results from last 7 days   Lab Units 04/27/22 0453 04/25/22  0614 04/25/22  0136   SODIUM mmol/L 123*   < > 124*   POTASSIUM mmol/L 4 2   < > 4 4   CHLORIDE mmol/L 90*   < > 86*   CO2 mmol/L 24   < > 26   BUN mg/dL 18   < > 14   CREATININE mg/dL 1 03   < > 0 82   ANION GAP mmol/L 9   < > 12   CALCIUM mg/dL 7 6*   < > 9 0   ALBUMIN g/dL  --   --  4 2   TOTAL BILIRUBIN mg/dL  --   --  1 27*   ALK PHOS U/L  --   --  50   ALT U/L  --   --  50   AST U/L  --   --  34   GLUCOSE RANDOM mg/dL 234*   < > 139    < > = values in this interval not displayed       Results from last 7 days   Lab Units 04/25/22  0009   INR  0 99     Results from last 7 days   Lab Units 04/27/22  1120 04/27/22  0717 04/27/22  0200 04/26/22  2052 04/26/22  1651 04/26/22  1614 04/26/22  1142 04/26/22  0739 04/26/22  0210 04/25/22  2135 04/25/22  1623 04/25/22  1121   POC GLUCOSE mg/dl 93 149* 220* 135 141* 55* 224* 185* 232* 76 147* 335*         Results from last 7 days   Lab Units 04/25/22  0136   LACTIC ACID mmol/L 1 0       Lines/Drains:  Invasive Devices  Report    Peripheral Intravenous Line            Peripheral IV 04/25/22 Right Antecubital 2 days                        Recent Cultures (last 7 days):   Results from last 7 days   Lab Units 04/25/22  0136 04/25/22  0134   BLOOD CULTURE  No Growth at 48 hrs  No Growth at 48 hrs         Last 24 Hours Medication List:   Current Facility-Administered Medications   Medication Dose Route Frequency Provider Last Rate    acetaminophen  650 mg Oral Q6H PRN MANAS Arce      albuterol  2 5 mg Nebulization 4x Daily Karen Higgins MD      ALPRAZolam  2 mg Oral HS MANAS Arce      apixaban  5 mg Oral BID MANAS Arce      vitamin C  1,000 mg Oral Daily MANAS Arce      aspirin  81 mg Oral Daily MANAS Arce      atorvastatin  80 mg Oral Daily MANAS Arce      busPIRone  10 mg Oral BID MAANS Arce      cholecalciferol  1,000 Units Oral Daily MANAS Arce      Diclofenac Sodium  2 g Topical 4x Daily Karen Higgins MD      digoxin  250 mcg Oral Daily MANAS Arce      fluticasone-vilanterol  1 puff Inhalation Daily MANAS Arce      gabapentin  300 mg Oral TID MANAS Arce      HYDROmorphone  0 5 mg Intravenous Q3H PRN Abi Hughes MD      insulin glargine  60 Units Subcutaneous Q12H [facility] Abi Hughes MD      insulin lispro  2-12 Units Subcutaneous TID AC MANAS Arce      insulin lispro  2-12 Units Subcutaneous HS MANAS Arce      insulin lispro  2-12 Units Subcutaneous 0200 MANAS Arce      insulin lispro  35 Units Subcutaneous TID With Meals MANAS Arce  lidocaine  1 patch Topical Daily Jt Roberson MD      menthol-methyl salicylate   Apply externally 4x Daily PRN Jt Roberson MD      metoprolol succinate  200 mg Oral Daily Margretta Severe, CRNP      oxyCODONE  5 mg Oral Q4H PRN Jt Roberson MD      QUEtiapine  100 mg Oral QAM Margretta Severe, CRNP      QUEtiapine  300 mg Oral HS Margretta Severe, CRNP      sertraline  50 mg Oral Daily Margretta Severe, CRNP      IV infusion builder  50 mL/hr Intravenous Continuous Kesha Valencia MD 50 mL/hr (04/27/22 1008)    tiZANidine  4 mg Oral Q8H PRN Jt Roberson MD      traMADol  50 mg Oral Q6H PRN Jt Roberson MD          Today, Patient Was Seen By: Shahla Ray MD    **Please Note: This note may have been constructed using a voice recognition system  **

## 2022-04-27 NOTE — ASSESSMENT & PLAN NOTE
· Patient has chronic hyponatremia 129-135 discharged at 127 on 4/21  · Initial sodium was 119, improved to 124 approx 90 mins later with no intervention - initial specimen was hemolyzed with some spurious results, question validity of initial sample  · Nephrology consultation appreciated, now on 1 8% fluid  · Unclear cause?

## 2022-04-27 NOTE — ASSESSMENT & PLAN NOTE
Lab Results   Component Value Date    HGBA1C 8 6 (H) 03/25/2022       Recent Labs     04/26/22  2052 04/27/22  0200 04/27/22  0717 04/27/22  1120   POCGLU 135 220* 149* 93       Blood Sugar Average: Last 72 hrs:  (P) 009 [id number]   · continue Lantus 75 units b i d   · Accu-Cheks a c  HS with insulin sliding scale    Fluctuant, but relatively controlled given 60 of lantus + ssi + 35 with meals impairments found/functional limitations in following categories/anticipated discharge recommendation

## 2022-04-27 NOTE — PROGRESS NOTES
NEPHROLOGY PROGRESS NOTE   Marquis Dickson 57 y o  male MRN: [id number]  Unit/Bed#: [address] [place] [address] Encounter: [id number]  Reason for Consult: Hyponatremia      SUMMARY:    48-year-old male with history of chronic hyponatremia, hypertension, bipolar disorder, presents for back pain, and was found to meet SIRS criteria   Nephrology consult for hyponatremia      ASSESSMENT and PLAN:     Hyponatremia  --acute on chronic  --baseline sodium 129-135  --examined slightly hypovolemic, the setting of decreased oral intake, along with diarrhea, also being maintained on spironolactone  --hold spironolactone at this time to us oral intake improves and diarrhea improved  --concurrent hyperglycemia, stable renal function, low serum chloride, and normal potassium  --admission sodium 119 but repeat was 124 without any intervention, I do not suspect a 119 to be accurate  --started 1 8% saline on April 26, sodium mildly improved with corrected sodium 126 for the hyperglycemia  --serum osmolality noted to be low, 272, consistent with hypoosmolar  --urine sodium low at 15 despite fluid, urine osmolality 367, appears to be more consistent with hypovolemic volume depletion  --currently on Seroquel, which can result in SIADH, and may explain the chronicity of his hyponatremia  --there is also component of pain which can cause an increase in ADH level    --increase the rate of the 1 8% saline to 60 mL/hour  --sodium check every 6 hours  --goal sodium for tomorrow morning should be no more than 134     Hypertension  --CT scan shows no evidence of volume overload or pulmonary edema  --examines close to euvolemic but slightly hypovolemic  --ongoing diarrhea  --decreased oral intake  --blood pressure is soft  --discontinue spironolactone and losartan     Elevated creatinine/acute kidney injury--resolved  --creatinine back to baseline at 1 mg/dL  --blood pressure had been low currently holding blood pressure medication  --after being stable on admission  --could be a component of prerenal azotemia  --continue hypertonic saline      SUBJECTIVE / INTERVAL HISTORY:    Still having some diarrhea starting to eat a little bit this morning but having back pain    OBJECTIVE:  Current Weight: Weight - Scale: 124 kg (274 lb)  Vitals:    04/26/22 2300 04/27/22 0600 04/27/22 0726 04/27/22 0858   BP: 100/66  96/54    BP Location: Left arm  Left arm    Pulse: 70  68    Resp: 16  18    Temp: 98 6 °F (37 °C)  98 3 °F (36 8 °C)    TempSrc: Axillary  Axillary    SpO2: 95%  98% 97%   Weight:  124 kg (274 lb)     Height:           Intake/Output Summary (Last 24 hours) at 4/27/2022 0955  Last data filed at 4/27/2022 0901  Gross per 24 hour   Intake 780 ml   Output 2500 ml   Net -1720 ml       Review of Systems:    12 point ROS has been reviewed  Physical Exam  Vitals and nursing note reviewed  Constitutional:       General: He is not in acute distress  Appearance: He is well-developed  He is obese  HENT:      Head: Normocephalic and atraumatic  Eyes:      General: No scleral icterus  Conjunctiva/sclera: Conjunctivae normal       Pupils: Pupils are equal, round, and reactive to light  Cardiovascular:      Rate and Rhythm: Normal rate and regular rhythm  Heart sounds: S1 normal and S2 normal  No murmur heard  No friction rub  No gallop  Pulmonary:      Effort: Pulmonary effort is normal  No respiratory distress  Breath sounds: Normal breath sounds  No wheezing or rales  Abdominal:      General: Bowel sounds are normal       Palpations: Abdomen is soft  Tenderness: There is no abdominal tenderness  There is no rebound  Musculoskeletal:         General: Normal range of motion  Cervical back: Normal range of motion and neck supple  Skin:     Findings: No rash  Neurological:      Mental Status: He is alert and oriented to person, place, and time     Psychiatric:         Behavior: Behavior normal          Medications:    Current Facility-Administered Medications:     acetaminophen (TYLENOL) tablet 650 mg, 650 mg, Oral, Q6H PRN, MANAS Melgoza    albuterol inhalation solution 2 5 mg, 2 5 mg, Nebulization, Q6H PRN, MANAS Melgoza, 2 5 mg at 04/27/22 0858    ALPRAZolam (XANAX) tablet 2 mg, 2 mg, Oral, HS, MANAS Melgoza, 2 mg at 04/26/22 2144    apixaban (ELIQUIS) tablet 5 mg, 5 mg, Oral, BID, MANAS Melgoza, 5 mg at 04/27/22 2153    ascorbic acid (VITAMIN C) tablet 1,000 mg, 1,000 mg, Oral, Daily, MANAS Melgoza, 1,000 mg at 04/27/22 0856    aspirin (ECOTRIN LOW STRENGTH) EC tablet 81 mg, 81 mg, Oral, Daily, MANAS Melgoza, 81 mg at 04/27/22 0856    atorvastatin (LIPITOR) tablet 80 mg, 80 mg, Oral, Daily, MANAS Melgoza, 80 mg at 04/27/22 0856    busPIRone (BUSPAR) tablet 10 mg, 10 mg, Oral, BID, MANAS Melgoza, 10 mg at 04/27/22 4081    cholecalciferol (VITAMIN D3) tablet 1,000 Units, 1,000 Units, Oral, Daily, MANAS Melgoza, 1,000 Units at 04/27/22 0856    digoxin (LANOXIN) tablet 250 mcg, 250 mcg, Oral, Daily, MANAS Melgoza, 250 mcg at 04/27/22 0856    fluticasone-vilanterol (BREO ELLIPTA) 100-25 mcg/inh inhaler 1 puff, 1 puff, Inhalation, Daily, MANAS Melgoza, 1 puff at 04/27/22 0857    gabapentin (NEURONTIN) capsule 300 mg, 300 mg, Oral, TID, MANAS Melgoza, 300 mg at 04/27/22 0855    HYDROmorphone (DILAUDID) injection 0 5 mg, 0 5 mg, Intravenous, Q3H PRN, Brayan Ramírez MD, 0 5 mg at 04/25/22 2125    insulin glargine (LANTUS) subcutaneous injection 60 Units 0 6 mL, 60 Units, Subcutaneous, Q12H [address], Brayan Ramírez MD, 60 Units at 04/27/22 0857    insulin lispro (HumaLOG) 100 units/mL subcutaneous injection 2-12 Units, 2-12 Units, Subcutaneous, TID AC, 4 Units at 04/26/22 1149 **AND** Fingerstick Glucose (POCT), , , TID AC, MANAS Melgoza    insulin lispro (HumaLOG) 100 units/mL subcutaneous injection 2-12 Units, 2-12 Units, Subcutaneous, HS, MANAS Feliciano    insulin lispro (HumaLOG) 100 units/mL subcutaneous injection 2-12 Units, 2-12 Units, Subcutaneous, 0200, MANAS Feliciano, 4 Units at 04/27/22 0225    insulin lispro (HumaLOG) 100 units/mL subcutaneous injection 35 Units, 35 Units, Subcutaneous, TID With Meals, MANAS Feliciano, 35 Units at 04/27/22 0733    lidocaine (LIDODERM) 5 % patch 1 patch, 1 patch, Topical, Daily, Kait Kovacs MD, 1 patch at 04/27/22 0858    menthol-methyl salicylate (BENGAY) 27-72 % cream, , Apply externally, 4x Daily PRN, Kait Kovacs MD    metoprolol succinate (TOPROL-XL) 24 hr tablet 200 mg, 200 mg, Oral, Daily, MANAS Feliciano, 200 mg at 04/26/22 1215    oxyCODONE (ROXICODONE) IR tablet 5 mg, 5 mg, Oral, Q4H PRN, Kait Kovacs MD, 5 mg at 04/27/22 0858    QUEtiapine (SEROquel XR) 24 hr tablet 100 mg, 100 mg, Oral, QAM, MANAS Feliciano, 100 mg at 04/26/22 0932    QUEtiapine (SEROquel) tablet 300 mg, 300 mg, Oral, HS, MANAS Feliciano, 300 mg at 04/26/22 2144    sertraline (ZOLOFT) tablet 50 mg, 50 mg, Oral, Daily, MANAS Feliciano, 50 mg at 04/27/22 0856    sodium chloride (concentrated) 308 mEq in sterile water 1,000 mL infusion, 50 mL/hr, Intravenous, Continuous, Edward Mcdonough MD, Last Rate: 50 mL/hr at 04/26/22 0945, 50 mL/hr at 04/26/22 0945    tiZANidine (ZANAFLEX) tablet 4 mg, 4 mg, Oral, Q8H PRN, Kait Kovacs MD    torsemide [facility]) tablet 10 mg, 10 mg, Oral, Once, Edward Mcdonough MD    traMADol (ULTRAM) tablet 50 mg, 50 mg, Oral, Q6H PRN, Kait Kovacs MD, 50 mg at 04/27/22 0732    Laboratory Results:  Results from last 7 days   Lab Units 04/27/22  0453 04/26/22  0445 04/25/22  0614 04/25/22  0136 04/25/22  0009 04/21/22  0621   WBC Thousand/uL 15 95* 19 22* 22 42* 23 88* 26 13* 10 44*   HEMOGLOBIN g/dL 11 9* 13 0 15 4 14 3 15 5 12 7   HEMATOCRIT % 34 6* 39 2 44 4 40 8 42 9 36 6   PLATELETS Thousands/uL 187 216 271 277 277 169 POTASSIUM mmol/L 4 2 4 5 4 9 4 4 6 0* 4 1   CHLORIDE mmol/L 90* 89* 87* 86* 84* 91*   CO2 mmol/L 24 24 26 26 27 27   BUN mg/dL 18 28* 13 14 14 16   CREATININE mg/dL 1 03 1 41* 0 84 0 82 0 69 0 85   CALCIUM mg/dL 7 6* 8 3 9 0 9 0 9 3 9 2   MAGNESIUM mg/dL  --   --  2 2  --   --  1 8       PLEASE NOTE:  This encounter was completed utilizing the [employer] Direct Speech Voice Recognition Software  Grammatical errors, random word insertions, pronoun errors and incomplete sentences are occasional consequences of the system due to software limitations, ambient noise and hardware issues  These may be missed by proof reading prior to affixing electronic signature  Any questions or concerns about the content, text or information contained within the body of this dictation should be directly addressed to the physician for clarification  Please do not hesitate to call me directly if you have any any questions or concerns

## 2022-04-27 NOTE — CASE MANAGEMENT
Case Management Progress Note    Patient name Guy Vega  Location 3 [facility] 321/3 [address]-* MRN [id number]  : 1960 Date 2022       LOS (days): 2  Geometric Mean LOS (GMLOS) (days): 2 90  Days to GMLOS:1        OBJECTIVE:    Current admission status: Inpatient  Preferred Pharmacy:   [address] [place] [address], [address] [place] [id number]  Phone: [phone] Fax: [phone]    Primary Care Provider: Diana Mabry MD    Primary Insurance: [address] [place] [address],[address] REP  Secondary Insurance: [employer] [facility] [address] [facility] [place] [address]    PROGRESS NOTE:    Faxed failed a second time  Second message left for Dee Dee NAZARIO placed another call to Santa Padron at [facility] to request assistance  Santa Padron suggested SW faxed clinical to her ([phone]) and she will email it to Dee Dee NAZARIO reinforced with Santa Padron pt's ability to transfer as soon as authorization is received  Santa Padron verbalized understanding  Will follow

## 2022-04-28 ENCOUNTER — DOCUMENTATION (OUTPATIENT)
Dept: HEMATOLOGY ONCOLOGY | Facility: CLINIC | Age: 62
End: 2022-04-28

## 2022-04-28 ENCOUNTER — TELEPHONE (OUTPATIENT)
Dept: HEMATOLOGY ONCOLOGY | Facility: CLINIC | Age: 62
End: 2022-04-28

## 2022-04-28 VITALS
WEIGHT: 273.37 LBS | TEMPERATURE: 98.1 F | SYSTOLIC BLOOD PRESSURE: 135 MMHG | BODY MASS INDEX: 38.27 KG/M2 | DIASTOLIC BLOOD PRESSURE: 63 MMHG | HEIGHT: 71 IN | HEART RATE: 77 BPM | OXYGEN SATURATION: 97 % | RESPIRATION RATE: 16 BRPM

## 2022-04-28 LAB
BASOPHILS # BLD AUTO: 0.03 THOUSANDS/ΜL (ref 0–0.1)
BASOPHILS NFR BLD AUTO: 0 % (ref 0–1)
EOSINOPHIL # BLD AUTO: 0.07 THOUSAND/ΜL (ref 0–0.61)
EOSINOPHIL NFR BLD AUTO: 1 % (ref 0–6)
ERYTHROCYTE [DISTWIDTH] IN BLOOD BY AUTOMATED COUNT: 12.8 % (ref 11.6–15.1)
GLUCOSE SERPL-MCNC: 125 MG/DL (ref 65–140)
GLUCOSE SERPL-MCNC: 152 MG/DL (ref 65–140)
GLUCOSE SERPL-MCNC: 164 MG/DL (ref 65–140)
GLUCOSE SERPL-MCNC: 56 MG/DL (ref 65–140)
GLUCOSE SERPL-MCNC: 69 MG/DL (ref 65–140)
GLUCOSE SERPL-MCNC: 81 MG/DL (ref 65–140)
HCT VFR BLD AUTO: 33.6 % (ref 36.5–49.3)
HGB BLD-MCNC: 11.6 G/DL (ref 12–17)
IMM GRANULOCYTES # BLD AUTO: 0.02 THOUSAND/UL (ref 0–0.2)
IMM GRANULOCYTES NFR BLD AUTO: 0 % (ref 0–2)
LYMPHOCYTES # BLD AUTO: 6.85 THOUSANDS/ΜL (ref 0.6–4.47)
LYMPHOCYTES NFR BLD AUTO: 63 % (ref 14–44)
MCH RBC QN AUTO: 31.8 PG (ref 26.8–34.3)
MCHC RBC AUTO-ENTMCNC: 34.5 G/DL (ref 31.4–37.4)
MCV RBC AUTO: 92 FL (ref 82–98)
MONOCYTES # BLD AUTO: 0.51 THOUSAND/ΜL (ref 0.17–1.22)
MONOCYTES NFR BLD AUTO: 5 % (ref 4–12)
NEUTROPHILS # BLD AUTO: 3.32 THOUSANDS/ΜL (ref 1.85–7.62)
NEUTS SEG NFR BLD AUTO: 31 % (ref 43–75)
NRBC BLD AUTO-RTO: 0 /100 WBCS
PLATELET # BLD AUTO: 189 THOUSANDS/UL (ref 149–390)
PMV BLD AUTO: 8.8 FL (ref 8.9–12.7)
RBC # BLD AUTO: 3.65 MILLION/UL (ref 3.88–5.62)
SCAN RESULT: NORMAL
SODIUM SERPL-SCNC: 134 MMOL/L (ref 136–145)
WBC # BLD AUTO: 10.8 THOUSAND/UL (ref 4.31–10.16)

## 2022-04-28 PROCEDURE — 99232 SBSQ HOSP IP/OBS MODERATE 35: CPT | Performed by: INTERNAL MEDICINE

## 2022-04-28 PROCEDURE — 82948 REAGENT STRIP/BLOOD GLUCOSE: CPT

## 2022-04-28 PROCEDURE — 85025 COMPLETE CBC W/AUTO DIFF WBC: CPT | Performed by: HOSPITALIST

## 2022-04-28 PROCEDURE — 94640 AIRWAY INHALATION TREATMENT: CPT

## 2022-04-28 PROCEDURE — 84295 ASSAY OF SERUM SODIUM: CPT | Performed by: INTERNAL MEDICINE

## 2022-04-28 PROCEDURE — 99239 HOSP IP/OBS DSCHRG MGMT >30: CPT | Performed by: HOSPITALIST

## 2022-04-28 PROCEDURE — 94760 N-INVAS EAR/PLS OXIMETRY 1: CPT

## 2022-04-28 RX ORDER — TIZANIDINE 4 MG/1
4 TABLET ORAL EVERY 8 HOURS PRN
Refills: 0
Start: 2022-04-28 | End: 2022-06-21

## 2022-04-28 RX ORDER — OXYCODONE HYDROCHLORIDE 5 MG/1
5 TABLET ORAL EVERY 4 HOURS PRN
Qty: 15 TABLET | Refills: 0
Start: 2022-04-28 | End: 2022-05-20 | Stop reason: HOSPADM

## 2022-04-28 RX ADMIN — GABAPENTIN 300 MG: 300 CAPSULE ORAL at 15:53

## 2022-04-28 RX ADMIN — OXYCODONE HYDROCHLORIDE AND ACETAMINOPHEN 1000 MG: 500 TABLET ORAL at 09:24

## 2022-04-28 RX ADMIN — SERTRALINE HYDROCHLORIDE 50 MG: 50 TABLET ORAL at 09:25

## 2022-04-28 RX ADMIN — DICLOFENAC SODIUM TOPICAL GEL, 1%, 2 G: 10 GEL TOPICAL at 18:14

## 2022-04-28 RX ADMIN — ALBUTEROL SULFATE 2.5 MG: 2.5 SOLUTION RESPIRATORY (INHALATION) at 07:21

## 2022-04-28 RX ADMIN — DIGOXIN 250 MCG: 125 TABLET ORAL at 09:24

## 2022-04-28 RX ADMIN — ASPIRIN 81 MG: 81 TABLET, COATED ORAL at 09:24

## 2022-04-28 RX ADMIN — DICLOFENAC SODIUM TOPICAL GEL, 1%, 2 G: 10 GEL TOPICAL at 09:28

## 2022-04-28 RX ADMIN — GABAPENTIN 300 MG: 300 CAPSULE ORAL at 09:25

## 2022-04-28 RX ADMIN — INSULIN LISPRO 35 UNITS: 100 INJECTION, SOLUTION INTRAVENOUS; SUBCUTANEOUS at 08:13

## 2022-04-28 RX ADMIN — Medication 1000 UNITS: at 09:25

## 2022-04-28 RX ADMIN — FLUTICASONE FUROATE AND VILANTEROL TRIFENATATE 1 PUFF: 100; 25 POWDER RESPIRATORY (INHALATION) at 09:29

## 2022-04-28 RX ADMIN — BUSPIRONE HYDROCHLORIDE 10 MG: 10 TABLET ORAL at 18:13

## 2022-04-28 RX ADMIN — LIDOCAINE 5% 1 PATCH: 700 PATCH TOPICAL at 09:25

## 2022-04-28 RX ADMIN — BUSPIRONE HYDROCHLORIDE 10 MG: 10 TABLET ORAL at 09:24

## 2022-04-28 RX ADMIN — METOPROLOL SUCCINATE 200 MG: 100 TABLET, EXTENDED RELEASE ORAL at 09:24

## 2022-04-28 RX ADMIN — OXYCODONE HYDROCHLORIDE 5 MG: 5 TABLET ORAL at 02:56

## 2022-04-28 RX ADMIN — APIXABAN 5 MG: 5 TABLET, FILM COATED ORAL at 18:13

## 2022-04-28 RX ADMIN — ATORVASTATIN CALCIUM 80 MG: 80 TABLET, FILM COATED ORAL at 09:25

## 2022-04-28 RX ADMIN — OXYCODONE HYDROCHLORIDE 5 MG: 5 TABLET ORAL at 08:12

## 2022-04-28 RX ADMIN — ALBUTEROL SULFATE 2.5 MG: 2.5 SOLUTION RESPIRATORY (INHALATION) at 15:57

## 2022-04-28 RX ADMIN — QUETIAPINE FUMARATE 100 MG: 50 TABLET, EXTENDED RELEASE ORAL at 09:30

## 2022-04-28 RX ADMIN — ALBUTEROL SULFATE 2.5 MG: 2.5 SOLUTION RESPIRATORY (INHALATION) at 11:32

## 2022-04-28 RX ADMIN — INSULIN LISPRO 35 UNITS: 100 INJECTION, SOLUTION INTRAVENOUS; SUBCUTANEOUS at 12:08

## 2022-04-28 RX ADMIN — APIXABAN 5 MG: 5 TABLET, FILM COATED ORAL at 09:25

## 2022-04-28 RX ADMIN — INSULIN GLARGINE 60 UNITS: 100 INJECTION, SOLUTION SUBCUTANEOUS at 09:25

## 2022-04-28 RX ADMIN — OXYCODONE HYDROCHLORIDE 5 MG: 5 TABLET ORAL at 15:53

## 2022-04-28 NOTE — TELEPHONE ENCOUNTER
Soft Intake Form   Patient Details   Georgie Gandhi     1960     Reason For Appointment   Who is Calling? In-Basket   If not patient, Name? Reji Viveros     DID YOU CONFIRM INSURANCE WITH PATIENT? n/a   Who is the Referring Doctor? Hospital follow up    What is the diagnosis? Chronic lymphocytic leukemia- newly dx    Has this diagnosis been confirmed by a biopsy/surgery? If yes, what is the date it was done? no   Biopsy done at [place] [address]? If not, where was it done? no   Was imaging done, and was it done at [facility]? If not, where was it done? Yes, [facility]   Have you been seen by another Oncologist?  If so, who and where (name of facility, city and state) Unsure   For 2nd Opinions Only: Are you currently undergoing treatment, or are you scheduled to start treatment? If yes, name of facility, city and state n/a   For "History Of" only: Have you completed treatment? n/a    Have you had Genetic Testing done in the past?  If so, advise to bring test results to their visit unsure   Record Gathering Information   Did you advise to have records faxed to [phone]? no   Did you advise to have disks sent to the proper address with imaging? ("History of" Patients)  5 years of imaging for breast patients-Mammos, US etc no   Scheduling Information   Did you send new patient paperwork? Email or mail? n/a   What is the best call back number?    (If the [facility] is calling, please use their number) [address] [place] follow up appt scheduled 6/1/22 11:20 am     Dx: CLL - newly dx (Chronic lymphocytic leukemia)  Location: [place]   Timeframe: 3-4 weeks   Provider:  Walter       Please reply when appointment confirmed

## 2022-04-28 NOTE — PLAN OF CARE
Problem: PAIN - ADULT  Goal: Verbalizes/displays adequate comfort level or baseline comfort level  Description: Interventions:  - Encourage patient to monitor pain and request assistance  - Assess pain using appropriate pain scale  - Administer analgesics based on type and severity of pain and evaluate response  - Implement non-pharmacological measures as appropriate and evaluate response  - Consider cultural and social influences on pain and pain management  - Notify physician/advanced practitioner if interventions unsuccessful or patient reports new pain  Outcome: Progressing     Problem: INFECTION - ADULT  Goal: Absence or prevention of progression during hospitalization  Description: INTERVENTIONS:  - Assess and monitor for signs and symptoms of infection  - Monitor lab/diagnostic results  - Monitor all insertion sites, i e  indwelling lines, tubes, and drains  - Monitor endotracheal if appropriate and nasal secretions for changes in amount and color  - [place] appropriate cooling/warming therapies per order  - Administer medications as ordered  - Instruct and encourage patient and family to use good hand hygiene technique  - Identify and instruct in appropriate isolation precautions for identified infection/condition  Outcome: Progressing  Goal: Absence of fever/infection during neutropenic period  Description: INTERVENTIONS:  - Monitor WBC    Outcome: Progressing     Problem: SAFETY ADULT  Goal: Patient will remain free of falls  Description: INTERVENTIONS:  - Educate patient/family on patient safety including physical limitations  - Instruct patient to call for assistance with activity   - Consult OT/PT to assist with strengthening/mobility   - Keep Call bell within reach  - Keep bed low and locked with side rails adjusted as appropriate  - Keep care items and personal belongings within reach  - Initiate and maintain comfort rounds  - Make Fall Risk Sign visible to staff  - Offer Toileting every 2  Hours, in advance of need  - Initiate/Maintain bed alarm  - Obtain necessary fall risk management equipment:   - Apply yellow socks and bracelet for high fall risk patients  - Consider moving patient to room near nurses station  Outcome: Progressing  Goal: Maintain or return to baseline ADL function  Description: INTERVENTIONS:  -  Assess patient's ability to carry out ADLs; assess patient's baseline for ADL function and identify physical deficits which impact ability to perform ADLs (bathing, care of mouth/teeth, toileting, grooming, dressing, etc )  - Assess/evaluate cause of self-care deficits   - Assess range of motion  - Assess patient's mobility; develop plan if impaired  - Assess patient's need for assistive devices and provide as appropriate  - Encourage maximum independence but intervene and supervise when necessary  - Involve family in performance of ADLs  - Assess for home care needs following discharge   - Consider OT consult to assist with ADL evaluation and planning for discharge  - Provide patient education as appropriate  Outcome: Progressing  Goal: Maintains/Returns to pre admission functional level  Description: INTERVENTIONS:  - Perform BMAT or MOVE assessment daily    - Set and communicate daily mobility goal to care team and patient/family/caregiver  - Collaborate with rehabilitation services on mobility goals if consulted  - Perform Range of Motion 3 times a day  - Reposition patient every 2 hours     - Out of bed for toileting  - Record patient progress and toleration of activity level   Outcome: Progressing     Problem: DISCHARGE PLANNING  Goal: Discharge to home or other facility with appropriate resources  Description: INTERVENTIONS:  - Identify barriers to discharge w/patient and caregiver  - Arrange for needed discharge resources and transportation as appropriate  - Identify discharge learning needs (meds, wound care, etc )  - Arrange for interpretive services to assist at discharge as needed  - Refer to Case Management Department for coordinating discharge planning if the patient needs post-hospital services based on physician/advanced practitioner order or complex needs related to functional status, cognitive ability, or social support system  Outcome: Progressing     Problem: Knowledge Deficit  Goal: Patient/family/caregiver demonstrates understanding of disease process, treatment plan, medications, and discharge instructions  Description: Complete learning assessment and assess knowledge base  Interventions:  - Provide teaching at level of understanding  - Provide teaching via preferred learning methods  Outcome: Progressing     Problem: MOBILITY - ADULT  Goal: Maintain or return to baseline ADL function  Description: INTERVENTIONS:  -  Assess patient's ability to carry out ADLs; assess patient's baseline for ADL function and identify physical deficits which impact ability to perform ADLs (bathing, care of mouth/teeth, toileting, grooming, dressing, etc )  - Assess/evaluate cause of self-care deficits   - Assess range of motion  - Assess patient's mobility; develop plan if impaired  - Assess patient's need for assistive devices and provide as appropriate  - Encourage maximum independence but intervene and supervise when necessary  - Involve family in performance of ADLs  - Assess for home care needs following discharge   - Consider OT consult to assist with ADL evaluation and planning for discharge  - Provide patient education as appropriate  Outcome: Progressing  Goal: Maintains/Returns to pre admission functional level  Description: INTERVENTIONS:  - Perform BMAT or MOVE assessment daily    - Set and communicate daily mobility goal to care team and patient/family/caregiver  - Collaborate with rehabilitation services on mobility goals if consulted  - Perform Range of Motion 3 times a day    - Out of bed for toileting  - Record patient progress and toleration of activity level   Outcome: Progressing     Problem: Potential for Falls  Goal: Patient will remain free of falls  Description: INTERVENTIONS:  - Educate patient/family on patient safety including physical limitations  - Instruct patient to call for assistance with activity   - Consult OT/PT to assist with strengthening/mobility   - Keep Call bell within reach  - Keep bed low and locked with side rails adjusted as appropriate  - Keep care items and personal belongings within reach  - Initiate and maintain comfort rounds  - Make Fall Risk Sign visible to staff  - Offer Toileting every 2 Hours, in advance of need  - Initiate/Maintain bed alarm  - Obtain necessary fall risk management equipment:   - Apply yellow socks and bracelet for high fall risk patients  - Consider moving patient to room near nurses station  Outcome: Progressing     Problem: RESPIRATORY - ADULT  Goal: Achieves optimal ventilation and oxygenation  Description: INTERVENTIONS:  - Assess for changes in respiratory status  - Assess for changes in mentation and behavior  - Position to facilitate oxygenation and minimize respiratory effort  - Oxygen administered by appropriate delivery if ordered  - Initiate smoking cessation education as indicated  - Encourage broncho-pulmonary hygiene including cough, deep breathe, Incentive Spirometry  - Assess the need for suctioning and aspirate as needed  - Assess and instruct to report SOB or any respiratory difficulty  - Respiratory Therapy support as indicated  Outcome: Progressing     Problem: Prexisting or High Potential for Compromised Skin Integrity  Goal: Skin integrity is maintained or improved  Description: INTERVENTIONS:  - Identify patients at risk for skin breakdown  - Assess and monitor skin integrity  - Assess and monitor nutrition and hydration status  - Monitor labs   - Assess for incontinence   - Turn and reposition patient  - Assist with mobility/ambulation  - Relieve pressure over bony prominences  - Avoid friction and shearing  - Provide appropriate hygiene as needed including keeping skin clean and dry  - Evaluate need for skin moisturizer/barrier cream  - Collaborate with interdisciplinary team   - Patient/family teaching  - Consider wound care consult   Outcome: Progressing

## 2022-04-28 NOTE — PLAN OF CARE
Problem: PAIN - ADULT  Goal: Verbalizes/displays adequate comfort level or baseline comfort level  Description: Interventions:  - Encourage patient to monitor pain and request assistance  - Assess pain using appropriate pain scale  - Administer analgesics based on type and severity of pain and evaluate response  - Implement non-pharmacological measures as appropriate and evaluate response  - Consider cultural and social influences on pain and pain management  - Notify physician/advanced practitioner if interventions unsuccessful or patient reports new pain  Outcome: Progressing     Problem: INFECTION - ADULT  Goal: Absence or prevention of progression during hospitalization  Description: INTERVENTIONS:  - Assess and monitor for signs and symptoms of infection  - Monitor lab/diagnostic results  - Monitor all insertion sites, i e  indwelling lines, tubes, and drains  - Monitor endotracheal if appropriate and nasal secretions for changes in amount and color  - Germfask appropriate cooling/warming therapies per order  - Administer medications as ordered  - Instruct and encourage patient and family to use good hand hygiene technique  - Identify and instruct in appropriate isolation precautions for identified infection/condition  Outcome: Progressing  Goal: Absence of fever/infection during neutropenic period  Description: INTERVENTIONS:  - Monitor WBC    Outcome: Progressing     Problem: SAFETY ADULT  Goal: Patient will remain free of falls  Description: INTERVENTIONS:  - Educate patient/family on patient safety including physical limitations  - Instruct patient to call for assistance with activity   - Consult OT/PT to assist with strengthening/mobility   - Keep Call bell within reach  - Keep bed low and locked with side rails adjusted as appropriate  - Keep care items and personal belongings within reach  - Initiate and maintain comfort rounds  - Make Fall Risk Sign visible to staff  - Offer Toileting every 2 Hours, in advance of need  - Initiate/Maintain alarm  - Obtain necessary fall risk management equipment:   - Apply yellow socks and bracelet for high fall risk patients  - Consider moving patient to room near nurses station  Outcome: Progressing     Problem: DISCHARGE PLANNING  Goal: Discharge to home or other facility with appropriate resources  Description: INTERVENTIONS:  - Identify barriers to discharge w/patient and caregiver  - Arrange for needed discharge resources and transportation as appropriate  - Identify discharge learning needs (meds, wound care, etc )  - Arrange for interpretive services to assist at discharge as needed  - Refer to Case Management Department for coordinating discharge planning if the patient needs post-hospital services based on physician/advanced practitioner order or complex needs related to functional status, cognitive ability, or social support system  Outcome: Progressing     Problem: Knowledge Deficit  Goal: Patient/family/caregiver demonstrates understanding of disease process, treatment plan, medications, and discharge instructions  Description: Complete learning assessment and assess knowledge base    Interventions:  - Provide teaching at level of understanding  - Provide teaching via preferred learning methods  Outcome: Progressing     Problem: MOBILITY - ADULT  Goal: Maintain or return to baseline ADL function  Description: INTERVENTIONS:  -  Assess patient's ability to carry out ADLs; assess patient's baseline for ADL function and identify physical deficits which impact ability to perform ADLs (bathing, care of mouth/teeth, toileting, grooming, dressing, etc )  - Assess/evaluate cause of self-care deficits   - Assess range of motion  - Assess patient's mobility; develop plan if impaired  - Assess patient's need for assistive devices and provide as appropriate  - Encourage maximum independence but intervene and supervise when necessary  - Involve family in performance of ADLs  - Assess for home care needs following discharge   - Consider OT consult to assist with ADL evaluation and planning for discharge  - Provide patient education as appropriate  Outcome: Progressing  Goal: Maintains/Returns to pre admission functional level  Description: INTERVENTIONS:  - Perform BMAT or MOVE assessment daily    - Set and communicate daily mobility goal to care team and patient/family/caregiver  - Collaborate with rehabilitation services on mobility goals if consulted  - Perform Range of Motion 3 times a day  - Reposition patient every 2 hours    - Dangle patient 2 times a day  - Stand patient 2 times a day  - Ambulate patient 2 times a day  - Out of bed to chair 2 times a day   - Out of bed for meals 3 times a day  - Out of bed for toileting  - Record patient progress and toleration of activity level   Outcome: Progressing     Problem: RESPIRATORY - ADULT  Goal: Achieves optimal ventilation and oxygenation  Description: INTERVENTIONS:  - Assess for changes in respiratory status  - Assess for changes in mentation and behavior  - Position to facilitate oxygenation and minimize respiratory effort  - Oxygen administered by appropriate delivery if ordered  - Initiate smoking cessation education as indicated  - Encourage broncho-pulmonary hygiene including cough, deep breathe, Incentive Spirometry  - Assess the need for suctioning and aspirate as needed  - Assess and instruct to report SOB or any respiratory difficulty  - Respiratory Therapy support as indicated  Outcome: Progressing     Problem: RESPIRATORY - ADULT  Goal: Achieves optimal ventilation and oxygenation  Description: INTERVENTIONS:  - Assess for changes in respiratory status  - Assess for changes in mentation and behavior  - Position to facilitate oxygenation and minimize respiratory effort  - Oxygen administered by appropriate delivery if ordered  - Initiate smoking cessation education as indicated  - Encourage broncho-pulmonary hygiene including cough, deep breathe, Incentive Spirometry  - Assess the need for suctioning and aspirate as needed  - Assess and instruct to report SOB or any respiratory difficulty  - Respiratory Therapy support as indicated  Outcome: Progressing     Problem: Prexisting or High Potential for Compromised Skin Integrity  Goal: Skin integrity is maintained or improved  Description: INTERVENTIONS:  - Identify patients at risk for skin breakdown  - Assess and monitor skin integrity  - Assess and monitor nutrition and hydration status  - Monitor labs   - Assess for incontinence   - Turn and reposition patient  - Assist with mobility/ambulation  - Relieve pressure over bony prominences  - Avoid friction and shearing  - Provide appropriate hygiene as needed including keeping skin clean and dry  - Evaluate need for skin moisturizer/barrier cream  - Collaborate with interdisciplinary team   - Patient/family teaching  - Consider wound care consult   Outcome: Progressing

## 2022-04-28 NOTE — ASSESSMENT & PLAN NOTE
Patient will continue complaining of back pain  CT lumbar spine as below  Pain was attempted to be controlled with light a cane patch, oxycodone tramadol, and Dilaudid  He was also given p r n  Tizanidine  Seen by PT OT who recommended placement

## 2022-04-28 NOTE — ASSESSMENT & PLAN NOTE
· Nephrology was consulted,  sodium did reach a low of 119  [place] that this was likely part chronic secondary to Seroquel use in part hypovolemic due to diarrhea  Patient was started on hypertonic saline with improvement to 134 at time of discharge  Nephrology recommended holding Aldactone and torsemide at time of discharge as well as light fluid restriction (plan for to 1000 cc fluid restriction on discharge  · Fluid status should be monitored closely, as he will likely need his diuretic and Aldactone restarted following hospitalization

## 2022-04-28 NOTE — [place] UNIVERSAL TRANSFER FORM
[place] UNIVERSAL TRAN[place]ER FORM  (ALL ITEMS MUST BE COMPLETED)    1  TRANSFER FROM: [address]      TRANSFER TO: [facility]    2  DATE OF TRANSFER: 4/28/2022                        TIME OF TRANSFER:     3  PATIENT NAME: APOLINAR Nava      YOB: 1960                             GENDER: male    4  LANGUAGE:   English    5  PHYSICIAN NAME:  Leda Quiñones MD                   PHONE: [phone]  CODE STATUS: Level 1 - Full Code        Out of Hospital DNR Attached: No    7  :                                      :  Extended Emergency Contact Information  Primary Emergency Contact: Suzi Frazier  Mobile Phone: [phone]  Relation: Friend  Secondary Emergency Contact: Ellen Watson  Address: [address] Phone: [phone]  Mobile Phone: [phone]  Relation: [id number] Representative/Proxy:  No           Legal Guardian:  No             NAME OF:           HEALTH CARE REPRESENTATIVE/PROXY:                                         OR           LEGAL GUARDIAN, IF NOT :                                               PHONE:  (Day)           (Night)                        (Cell)    8  REASON FOR TRANSFER: (Must include brief medical history and recent changes in physical function or cognition ) need rehabilitation            V/S: /63 (BP Location: Left arm)   Pulse 77   Temp 98 1 °F (36 7 °C) (Oral)   Resp 16   Ht 5' 11" (1 803 m)   Wt 124 kg (273 lb 5 9 oz)   SpO2 97%   BMI 38 13 kg/m²           PAIN: None    9  PRIMARY DIAGNOSIS: Hyponatremia      Secondary Diagnosis:         Pacemaker: No      Internal Defib: No          Mental Health Diagnosis (if Applicable):    10  RESTRAINTS: No     11  RESPIRATORY NEEDS: Oxygen Device 2L via NC,    12  ISOLATION/PRECAUTION: None    13  ALLERGY: Wellbutrin [bupropion]    14  SENSORY:       Vision Glasses    15  SKIN CONDITION: Yes:  Othergroin abcess    16  DIET: Regular    17  IV ACCESS: None    18  PERSONAL ITEMS SENT WITH PATIENT: Glasses    19  ATTACHED DOCUMENTS: MUST ATTACH CURRENT MEDICATION INFORMATION Face Sheet and Discharge Summary    20  AT RISK ALERTS:Falls        HARM TO: N/A    21  WEIGHT BEARING STATUS:         Left Leg: Full        Right Leg: Full    22  MENTAL STATUS:Alert and Oriented    23  FUNCTION:        Walk: With Help        Transfer: With Help        Toilet: With Help        Feed: Self    24  IMMUNIZATIONS/SCREENING:     Immunization History   Administered Date(s) Administered    COVID-19 [employer]) vaccine 0 5 mL 04/12/2021    Hep B, adult 12/22/2008, 03/26/2009, 12/08/2009    Influenza Quadrivalent Preservative Free 3 years and older IM 09/25/2017    Influenza, injectable, quadrivalent, preservative free 0 5 mL 10/08/2018    Influenza, recombinant, quadrivalent,injectable, preservative free 10/12/2020, 11/26/2021    Influenza, seasonal, injectable 10/14/2003, 12/28/2004, 10/14/2005, 10/29/2007, 11/14/2008, 10/05/2009, 01/05/2011, 09/28/2011, 10/26/2012, 09/04/2013, 10/11/2014, 09/08/2015, 09/28/2016    MMR 12/04/2008, 03/26/2009    Meningococcal, Unknown Serogroups 12/22/2008    Pneumococcal Conjugate 13-Valent 09/08/2015, 11/29/2016    Pneumococcal Polysaccharide PPV23 10/14/2003    Tdap 12/04/2008    Tuberculin Skin Test-PPD Intradermal 10/30/2007, 05/14/2009, 06/02/2009, 04/20/2010, 05/04/2010       25  BOWEL: Continent    26  BLADDER: Continent    27   SENDING FACILITY CONTACT: Bihsop Magaña                   Title: RN        Unit: [address] [place] [address]        Phone: [phone] [address] (if known):        Title:        Unit:         Phone:         FORM PREFILLED BY (if applicable)       Title:       Unit:        Phone:         FORM COMPLETED BY Bishop Magaña RN      Title: MARY ANN      Phone: [phone]

## 2022-04-28 NOTE — QUICK NOTE
Hematology-Oncology [facility],9/99/[birth year]96,[id number]  04/28/22    Follow-up blood test         Newly diagnosed with CLL, stage 0  Previous CT scans did not demonstrate any organomegaly  White blood cell count is low less than 20,000 per unit L and fluctuates up and down  Patient is not anemic  We discussed staging: Stage 0  People can be observed with stage 0 for a protracted period of time  We discussed symptoms of CLL  Patient does have a lot of fatigue which can be attributed to other comorbidities    I discussed with the patient that we typically follow blood work over the periods of several weeks to months when someone is diagnosed with stage 0 CLL  Patient will require additional blood work that can be performed on an outpatient basis  This office will arrange follow-up appointment within the next 3-4 weeks

## 2022-04-28 NOTE — CASE MANAGEMENT
Case Management Discharge Planning Note    Patient name Matthew Colmenares  Location 3 [facility] [address] MRN [id number]  : 1960 Date 2022       Current Admission Date: 2022  Current Admission Diagnosis:Hyponatremia   Patient Active Problem List    Diagnosis Date Noted    Chronic diastolic CHF 3915    Chronic pain syndrome 2022    Foraminal stenosis of lumbar region 2022    Lumbar post-laminectomy syndrome 2022    Lumbar radiculopathy 2022    Shortness of breath 2022    SIRS (systemic inflammatory response syndrome) ([facility] ) 2022    Dysuria 2021    Peripheral neuropathy 2021    Chronic left-sided low back pain with left-sided sciatica 2021    BMI 40 0-44 9, adult ([facility] ) 2021    Muscle weakness (generalized) 2021    Platelets decreased ([facility] ) 2021    Medicare annual wellness visit, subsequent 2021    Chronic congestive heart failure ([address] ) 2020    Leukocytosis 10/29/2020    Hyperlipidemia 10/29/2020    Nutritional anemia, unspecified  10/29/2020    Chest pain 10/11/2020    Simple chronic bronchitis ([facility] ) 10/11/2020    Atrial fibrillation 2020    Hyperglycemia 2020    Transition of care performed with sharing of clinical summary 2020    Obstructive sleep apnea 2020    Obesity (BMI 30-39 9) 2020    Stage 2 chronic kidney disease 2020    Hyponatremia 2020    COPD (chronic obstructive pulmonary disease) ([facility] ) 2020    Chronic a-fib ([facility] ) 2020    H/O vitamin D deficiency 10/28/2019    Dyslipidemia 2019    Type 2 diabetes mellitus with complication, with long-term current use of insulin ([address] ) 2019    Restrictive ventilatory defect 2019    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 [facility]) 2019    Lung disease, restrictive 2018    Chronic respiratory failure with hypoxia ([facility] ) 10/31/2018    Coronary artery disease 09/06/2017    Esophageal reflux 09/06/2017    ANA LILIA (acute kidney injury) ([facility] ) 03/20/2017    Back pain 11/30/2016    SHAVONNE and COPD overlap syndrome      Morbid obesity      Insulin-dependent diabetes mellitus with neuropathy 09/02/2016    Fatigue 09/02/2016    GERD 06/08/2016    Cough 01/13/2016    COPD with exacerbation ([facility] ) 01/13/2016    Psychiatric disorder     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/25/2015    Erectile dysfunction of organic origin 08/25/2014    Essential hypertension 09/04/2012    Diabetic neuropathy ([facility] ) 09/04/2012    Panic disorder without agoraphobia 09/04/2012    Vitamin D deficiency 09/04/2012      LOS (days): 3  Geometric Mean LOS (GMLOS) (days): 2 90  Days to GMLOS:0     OBJECTIVE:  Risk of Unplanned Readmission Score: 40     Current admission status: Inpatient   Preferred Pharmacy:   [address] [place] [address]  Phone: [phone] Fax: [phone]    Primary Care Provider: Hansa Ward MD    Primary Insurance: Law Lopez [facility]  Secondary Insurance: Law Lopez [facility]    DISCHARGE DETAILS:    Treatment Team Recommendation: SNF  Discharge Destination Plan[de-identified] SNF  Transport at Discharge : [facility]  Dispatcher Contacted: Yes  Number/Name of Dispatcher: Barry Doyle by [employer] and Unit #):  (pending)  ETA of Transport (Date): 04/28/22  ETA of Transport (Time): 1400 (requested)      Accepting Facility Name, [address] : [facility] [place] [place]  Receiving Facility/Agency Phone Number: [phone]

## 2022-04-28 NOTE — TELEPHONE ENCOUNTER
Intake received- chart reviewed 4/28/22    Outside records needed? No    Pathology complete? - Leukemia flow cytometry 4/26/22-   [facility]    Imaging complete?  CT 4/17/22, 4/24/22- [facility]

## 2022-04-28 NOTE — CASE MANAGEMENT
Case Management Discharge Planning Note    Patient name Arti Warren  [facility] [address] MRN [id number]  : 1960 Date 2022       Current Admission Date: 2022  Current Admission Diagnosis:Back pain   Patient Active Problem List    Diagnosis Date Noted    Chronic diastolic CHF     Chronic pain syndrome 2022    Foraminal stenosis of lumbar region 2022    Lumbar post-laminectomy syndrome 2022    Lumbar radiculopathy 2022    Shortness of breath 2022    SIRS (systemic inflammatory response syndrome) ([facility] ) 2022    Dysuria 2021    Peripheral neuropathy 2021    Chronic left-sided low back pain with left-sided sciatica 2021    BMI 40 0-44 9, adult ([facility] ) 2021    Muscle weakness (generalized) 2021    Platelets decreased ([facility] ) 2021    Medicare annual wellness visit, subsequent 2021    Chronic congestive heart failure ([address] ) 2020    Leukocytosis 10/29/2020    Hyperlipidemia 10/29/2020    Nutritional anemia, unspecified  10/29/2020    Chest pain 10/11/2020    Simple chronic bronchitis ([address] ) 10/11/2020    Atrial fibrillation 2020    Hyperglycemia 2020    Transition of care performed with sharing of clinical summary 2020    Obstructive sleep apnea 2020    Obesity (BMI 30-39 9) 2020    Stage 2 chronic kidney disease 2020    Hyponatremia 2020    COPD (chronic obstructive pulmonary disease) ([facility] ) 2020    Chronic a-fib ([facility] ) 2020    H/O vitamin D deficiency 10/28/2019    Dyslipidemia 2019    Type 2 diabetes mellitus with complication, with long-term current use of insulin ([facility] ) 2019    Restrictive ventilatory defect 2019    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 [facility]) 2019    Lung disease, restrictive 2018    Chronic respiratory failure with hypoxia ([facility] ) 10/31/2018    Coronary artery disease 09/06/2017    Esophageal reflux 09/06/2017    ANA LILIA (acute kidney injury) ([facility] ) 03/20/2017    Back pain 11/30/2016    SHAVONNE and COPD overlap syndrome      Morbid obesity      Insulin-dependent diabetes mellitus with neuropathy 09/02/2016    Fatigue 09/02/2016    GERD 06/08/2016    Cough 01/13/2016    COPD with exacerbation ([facility] ) 01/13/2016    Psychiatric disorder     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/25/2015    Erectile dysfunction of organic origin 08/25/2014    Essential hypertension 09/04/2012    Diabetic neuropathy (HCC) 09/04/2012    Panic disorder without agoraphobia 09/04/2012    Vitamin D deficiency 09/04/2012      LOS (days): 3  Geometric Mean LOS (GMLOS) (days): 2 90  Days to GMLOS:0     OBJECTIVE:  Risk of Unplanned Readmission Score: 40     Current admission status: Inpatient   Preferred Pharmacy:   [address] [place] [id number]  Phone: [phone] Fax: [phone]    Primary Care Provider: Alexi Zamorano MD    Primary Insurance: [employer] [facility]  Secondary Insurance: Nallely Martel [facility]    DISCHARGE DETAILS:    Discharge planning discussed with[de-identified] Patient  Freedom of Choice: Yes  Comments - Freedom of Choice: Per rounds discharge is being considered for today  SW met with pt to review plan and discuss authorization approval received from [facility] [employer]  Pt is requesting transfer to [facility] and is aware that discharge is planned for today  CM contacted family/caregiver?: No- see comments    Requested [address]         Is the patient interested in [address] at discharge?: No    DME Referral Provided  Referral made for DME?: No    Other Referral/Resources/Interventions Provided:  Interventions: Short Term Rehab,SNF  Referral Comments: Pt has been accepted by [facility]    [employer] authorization has been received from [address] [place] [address] Health  Treatment Team Recommendation: SNF  Discharge Destination Plan[de-identified] SNF  Transport at Discharge : [facility]     IMM Given (Date):: 04/28/22  IMM Given to[de-identified] Patient (IMM reviewed with pt  Pt verbalized understanding and agrees with discharge determination  Pt signed IMM and declined copy    Copy placed in scan bin for chart )

## 2022-04-28 NOTE — ASSESSMENT & PLAN NOTE
Fluctuant, but relatively controlled given 60 of lantus + ssi + 35 with meals during hospitalization

## 2022-04-28 NOTE — DISCHARGE SUMMARY
Phan 44  Discharge- Jessica Rome 1960, 57 y o  male MRN: [id number]  Unit/Bed#: [address] [place] [address] Encounter: [id number]  Primary Care Provider: Hansa Ward MD   Date and time admitted to hospital: 4/24/2022 11:10 PM    Leukocytosis  Assessment & Plan  Given leukocytosis, smear with atypical lymphocytes and Hematology was consulted  Leukocytosis has since improved from peak of 26 shortly after admission to 11 at time of discharge  Atrial fibrillation  Assessment & Plan  · Continued on digoxin, Toprol and Eliquis during hospitalization  Rate was controlled    Back pain  Assessment & Plan  Patient will continue complaining of back pain  CT lumbar spine as below  Pain was attempted to be controlled with light a cane patch, oxycodone tramadol, and Dilaudid  He was also given p r n  Tizanidine  Seen by PT OT who recommended placement  Coronary artery disease  Assessment & Plan  · Continue aspirin, lipitor, lopressor, losartan     SHAVONNE and COPD overlap syndrome   Assessment & Plan  · Continue BiPAP q h s  · Settings 12/5, rate 12, O2 35%    Insulin-dependent diabetes mellitus with neuropathy  Assessment & Plan    Fluctuant, but relatively controlled given 60 of lantus + ssi + 35 with meals during hospitalization    Psychiatric disorder  Assessment & Plan  · Patient continued on home regimen of Zoloft, Seroquel, BuSpar and Xanax    * Hyponatremia  Assessment & Plan  · Nephrology was consulted,  sodium did reach a low of 119  [place] that this was likely part chronic secondary to Seroquel use in part hypovolemic due to diarrhea  Patient was started on hypertonic saline with improvement to 134 at time of discharge  Nephrology recommended holding Aldactone and torsemide at time of discharge as well as light fluid restriction (plan for to 1000 cc fluid restriction on discharge    · Fluid status should be monitored closely, as he will likely need his diuretic and Aldactone restarted following hospitalization  ** recommend repeat BMP (to check sodium level) as well as CBC within 1 week after discharge    Medical Problems             Resolved Problems  Date Reviewed: 4/26/2022    None              Discharging Physician / Practitioner: Gretchen Matias MD  PCP: Alejandra Murry MD  Admission Date:   Admission Orders (From admission, onward)     Ordered        04/25/22 1440  Inpatient Admission  Once            04/25/22 0016  Place in Observation  Once                      Discharge Date: 04/28/22    Consultations During Hospital Stay:  · Nephrology  · Hematology/oncology    Procedures Performed:   · CT lumbar spine - noncompressive thoracolumbar degenerative change  No acute osseous abnormality  · CT chest abdomen pelvis - no evidence of acute intrathoracic pathology; findings consistent with colitis    Significant Findings / Test Results:   · n/a    Incidental Findings:   · n/a     Test Results Pending at Discharge (will require follow up):   · n/a     Outpatient Tests Requested:  · n/a    Complications:  None    Reason for Admission:  Back pain    Hospital Course:   Elaina Bauman is a 57 y o  male patient who originally presented to the hospital on 4/24/2022 due to back pain          Condition at Discharge: fair    Discharge Day Visit / Exam:   Subjective:  Pain stable, diarrhea improving    Vitals: Blood Pressure: 135/63 (04/28/22 0722)  Pulse: 76 (04/28/22 0722)  Temperature: 98 1 °F (36 7 °C) (04/28/22 0722)  Temp Source: Oral (04/28/22 0722)  Respirations: 16 (04/28/22 0722)  Height: 5' 11" (180 3 cm) (04/25/22 1132)  Weight - Scale: 124 kg (273 lb 5 9 oz) (04/28/22 0600)  SpO2: 97 % (04/28/22 1132)  Exam:   Physical Exam   General:  No acute distress, lying in bed  Cardiovascular:  Regular rate, rhythm  Pulmonary:  Clear to auscultation bilaterally  Abdomen:  Soft, nondistended, nontender, obese  Neuro/psych:  Alert, oriented, pleasant    Discussion with Family: Patient declined call to   Discharge instructions/Information to patient and family:   See after visit summary for information provided to patient and family  Provisions for Follow-Up Care:  See after visit summary for information related to follow-up care and any pertinent home health orders  Disposition:   [facility]    Planned Readmission:  No     Discharge Statement:  I spent 39 minutes discharging the patient  This time was spent on the day of discharge  I had direct contact with the patient on the day of discharge  Greater than 50% of the total time was spent examining patient, answering all patient questions, arranging and discussing plan of care with patient as well as directly providing post-discharge instructions  Additional time then spent on discharge activities  Discharge Medications:  See after visit summary for reconciled discharge medications provided to patient and/or family        **Please Note: This note may have been constructed using a voice recognition system**

## 2022-04-28 NOTE — ASSESSMENT & PLAN NOTE
Given leukocytosis, smear with atypical lymphocytes and Hematology was consulted  Leukocytosis has since improved from peak of 26 shortly after admission to 11 at time of discharge

## 2022-04-28 NOTE — PROGRESS NOTES
NEPHROLOGY PROGRESS NOTE   Steven Mcneil 57 y o  male MRN: [id number]  Unit/Bed#: [address] [place] [address] Encounter: [id number]  Reason for Consult: Hyponatremia      SUMMARY:    69-year-old male with history of chronic hyponatremia, hypertension, bipolar disorder, presents for back pain, and was found to meet SIRS criteria   Nephrology consult for hyponatremia      ASSESSMENT and PLAN:     Hyponatremia  --acute on chronic  --baseline sodium 129-135  --examined slightly hypovolemic, the setting of decreased oral intake, along with diarrhea, also being maintained on spironolactone  --hold spironolactone at this time to us oral intake improves and diarrhea improved  --concurrent hyperglycemia, stable renal function, low serum chloride, and normal potassium  --admission sodium 119 but repeat was 124 without any intervention within the same time frame, I do not suspect a 119 to be accurate  --started 1 8% saline on April 26, sodium improved to 134 at around midnight, will discontinue 1 8% saline, repeat BMP this morning is pending  --serum osmolality noted to be low, 272, consistent with hypoosmolar  --urine sodium low at 15 despite fluid, urine osmolality 367, appears to be more consistent with hypovolemic volume depletion  --currently on Seroquel, which can result in SIADH, and may explain the chronicity of his hyponatremia  --there is also component of pain which can cause an increase in ADH level    --discontinue 1 8% saline  --follow-up repeat BMP this morning, will determine further sodium checks pending on this BMP results  --goal sodium for this morning low 132-135  --liberalize fluid restriction     Hypertension  --CT scan shows no evidence of volume overload or pulmonary edema  --examines close to euvolemic but slightly hypovolemic  --ongoing diarrhea  --oral intake is improving  --blood pressure currently normotensive  --continue to hold spironolactone and losartan     Elevated creatinine/acute kidney injury--resolved  --creatinine back to baseline at 1 mg/dL  --blood pressure had been low currently holding blood pressure medication, blood pressure improved off losartan and spironolactone  --after being stable on admission  --could be a component of prerenal azotemia  --discontinue fluids      SUBJECTIVE / INTERVAL HISTORY:    Still having diarrhea but tolerating oral intake better    OBJECTIVE:  Current Weight: Weight - Scale: 124 kg (273 lb 5 9 oz)  Vitals:    04/27/22 2140 04/27/22 2304 04/28/22 0600 04/28/22 0722   BP:  129/60     BP Location:  Left arm     Pulse:  74  76   Resp:  18  16   Temp:  98 2 °F (36 8 °C)     TempSrc:  Axillary     SpO2: 98% 97%  97%   Weight:   124 kg (273 lb 5 9 oz)    Height:           Intake/Output Summary (Last 24 hours) at 4/28/2022 0857  Last data filed at 4/28/2022 0600  Gross per 24 hour   Intake --   Output 5775 ml   Net -5775 ml       Review of Systems:    12 point ROS has been reviewed  Physical Exam  Vitals and nursing note reviewed  Constitutional:       General: He is not in acute distress  Appearance: He is well-developed  HENT:      Head: Normocephalic and atraumatic  Eyes:      General: No scleral icterus  Conjunctiva/sclera: Conjunctivae normal       Pupils: Pupils are equal, round, and reactive to light  Cardiovascular:      Rate and Rhythm: Normal rate and regular rhythm  Heart sounds: S1 normal and S2 normal  No murmur heard  No friction rub  No gallop  Pulmonary:      Effort: Pulmonary effort is normal  No respiratory distress  Breath sounds: Normal breath sounds  No wheezing or rales  Abdominal:      General: Bowel sounds are normal       Palpations: Abdomen is soft  Tenderness: There is no abdominal tenderness  There is no rebound  Musculoskeletal:         General: Normal range of motion  Cervical back: Normal range of motion and neck supple  Skin:     Findings: No rash     Neurological:      Mental Status: He is alert and oriented to person, place, and time     Psychiatric:         Behavior: Behavior normal          Medications:    Current Facility-Administered Medications:     acetaminophen (TYLENOL) tablet 650 mg, 650 mg, Oral, Q6H PRN, MANAS Arce    albuterol inhalation solution 2 5 mg, 2 5 mg, Nebulization, 4x Daily, Karen Higgins MD, 2 5 mg at 04/28/22 1633    ALPRAZolam Loye Lessen) tablet 2 mg, 2 mg, Oral, HS, MANAS Arce, 2 mg at 04/27/22 2122    apixaban (ELIQUIS) tablet 5 mg, 5 mg, Oral, BID, MANAS Arce, 5 mg at 04/27/22 1720    ascorbic acid (VITAMIN C) tablet 1,000 mg, 1,000 mg, Oral, Daily, MANAS Arce, 1,000 mg at 04/27/22 0856    aspirin (ECOTRIN LOW STRENGTH) EC tablet 81 mg, 81 mg, Oral, Daily, MANAS Arce, 81 mg at 04/27/22 0856    atorvastatin (LIPITOR) tablet 80 mg, 80 mg, Oral, Daily, MANAS Arce, 80 mg at 04/27/22 0856    busPIRone (BUSPAR) tablet 10 mg, 10 mg, Oral, BID, MANAS Arce, 10 mg at 04/27/22 1721    cholecalciferol (VITAMIN D3) tablet 1,000 Units, 1,000 Units, Oral, Daily, MANAS Arce, 1,000 Units at 04/27/22 2822    Diclofenac Sodium (VOLTAREN) 1 % topical gel 2 g, 2 g, Topical, 4x Daily, Karen Higgins MD, 2 g at 04/27/22 2137    digoxin (LANOXIN) tablet 250 mcg, 250 mcg, Oral, Daily, MANAS Arce, 250 mcg at 04/27/22 0856    fluticasone-vilanterol (BREO ELLIPTA) 100-25 mcg/inh inhaler 1 puff, 1 puff, Inhalation, Daily, MANAS Arce, 1 puff at 04/27/22 0857    gabapentin (NEURONTIN) capsule 300 mg, 300 mg, Oral, TID, MANAS Arce, 300 mg at 04/27/22 2124    HYDROmorphone (DILAUDID) injection 0 5 mg, 0 5 mg, Intravenous, Q3H PRN, Abi Hughes MD, 0 5 mg at 04/25/22 2125    insulin glargine (LANTUS) subcutaneous injection 60 Units 0 6 mL, 60 Units, Subcutaneous, Q12H [facility], Abi Hughes MD, 60 Units at 04/27/22 2123    insulin lispro (HumaLOG) 100 units/mL subcutaneous injection 2-12 Units, 2-12 Units, Subcutaneous, TID AC, 4 Units at 04/26/22 1149 **AND** Fingerstick Glucose (POCT), , , TID AC, Ignatius Fothergill, CRNP    insulin lispro (HumaLOG) 100 units/mL subcutaneous injection 2-12 Units, 2-12 Units, Subcutaneous, HS, Ignatius Fothergill, CRNP, 2 Units at 04/27/22 2124    insulin lispro (HumaLOG) 100 units/mL subcutaneous injection 2-12 Units, 2-12 Units, Subcutaneous, 0200, Ignatius Fothergill, CRNP, 4 Units at 04/27/22 0225    insulin lispro (HumaLOG) 100 units/mL subcutaneous injection 35 Units, 35 Units, Subcutaneous, TID With Meals, Ignatius Fothergill, CRNP, 35 Units at 04/28/22 0813    lidocaine (LIDODERM) 5 % patch 1 patch, 1 patch, Topical, Daily, Todd Cervantes MD, 1 patch at 04/27/22 0858    menthol-methyl salicylate (BENGAY) 43-64 % cream, , Apply externally, 4x Daily PRN, Todd Cervantes MD    metoprolol succinate (TOPROL-XL) 24 hr tablet 200 mg, 200 mg, Oral, Daily, Ignatius Fothergill, CRNP, 200 mg at 04/26/22 8221    oxyCODONE (ROXICODONE) IR tablet 5 mg, 5 mg, Oral, Q4H PRN, Todd Cervantes MD, 5 mg at 04/28/22 3062    QUEtiapine (SEROquel XR) 24 hr tablet 100 mg, 100 mg, Oral, QAM, Naeematius Fothergill, CRNP, 100 mg at 04/27/22 1008    QUEtiapine (SEROquel) tablet 300 mg, 300 mg, Oral, HS, Ignatius Fothergill, CRNP, 300 mg at 04/27/22 2124    sertraline (ZOLOFT) tablet 50 mg, 50 mg, Oral, Daily, Naeemus Fothergill, CRNP, 50 mg at 04/27/22 0856    tiZANidine (ZANAFLEX) tablet 4 mg, 4 mg, Oral, Q8H PRN, Todd Cervnates MD    traMADol Trudy Leyden) tablet 50 mg, 50 mg, Oral, Q6H PRN, Todd Cervantes MD, 50 mg at 04/27/22 0732    Laboratory Results:  Results from last 7 days   Lab Units 04/28/22  0520 04/27/22  0453 04/26/22  0445 04/25/22  0614 04/25/22  0136 04/25/22  0009   WBC Thousand/uL 10 80* 15 95* 19 22* 22 42* 23 88* 26 13*   HEMOGLOBIN g/dL 11 6* 11 9* 13 0 15 4 14 3 15 5   HEMATOCRIT % 33 6* 34 6* 39 2 44 4 40 8 42 9   PLATELETS Thousands/uL 189 187 216 271 277 277   POTASSIUM mmol/L  --  4 2 4 5 4 9 4 4 6 0*   CHLORIDE mmol/L  --  90* 89* 87* 86* 84*   CO2 mmol/L  --  24 24 26 26 27   BUN mg/dL  --  18 28* 13 14 14   CREATININE mg/dL  --  1 03 1 41* 0 84 0 82 0 69   CALCIUM mg/dL  --  7 6* 8 3 9 0 9 0 9 3   MAGNESIUM mg/dL  --   --   --  2 2  --   --        PLEASE NOTE:  This encounter was completed utilizing the [employer] Direct Speech Voice Recognition Software  Grammatical errors, random word insertions, pronoun errors and incomplete sentences are occasional consequences of the system due to software limitations, ambient noise and hardware issues  These may be missed by proof reading prior to affixing electronic signature  Any questions or concerns about the content, text or information contained within the body of this dictation should be directly addressed to the physician for clarification  Please do not hesitate to call me directly if you have any any questions or concerns

## 2022-04-29 ENCOUNTER — TRANSITIONAL CARE MANAGEMENT (OUTPATIENT)
Dept: FAMILY MEDICINE CLINIC | Facility: CLINIC | Age: 62
End: 2022-04-29

## 2022-04-29 ENCOUNTER — PATIENT OUTREACH (OUTPATIENT)
Dept: CASE MANAGEMENT | Facility: HOSPITAL | Age: 62
End: 2022-04-29

## 2022-04-29 ENCOUNTER — TELEPHONE (OUTPATIENT)
Dept: FAMILY MEDICINE CLINIC | Facility: CLINIC | Age: 62
End: 2022-04-29

## 2022-04-29 NOTE — PROGRESS NOTES
Heart Failure Outpatient Care Coordinator Note: Chart notes reviewed  Patient discharged to [facility] for [facility]  Will follow chart notes for remainder of bundle episode

## 2022-04-29 NOTE — UTILIZATION REVIEW
Notification of Discharge   This is a Notification of Discharge from our facility [address]  Please be advised that this patient has been discharge from our facility  Below you will find the admission and discharge date and time including the patients disposition  UTILIZATION REVIEW CONTACT:  Ivis Esposito  Utilization   Network Utilization Review Department  Phone: [phone] x carefully listen to the prompts  All voicemails are confidential   Email: [email or web address]     PHYSICIAN ADVISORY SERVICES:  FOR CAIP-UU-MYEI REVIEW - MEDICAL NECESSITY DENIAL  Phone: [phone]  Fax: [phone]  Email: [email or web address]     PRESENTATION DATE: 4/24/2022 11:10 PM  OBERVATION ADMISSION DATE:   INPATIENT ADMISSION DATE: 4/25/22  2:40 PM   DISCHARGE DATE: 4/28/2022  7:36 PM  DISPOSITION: Non [facility] SNF/TCU/SNU Non [facility] SNF/TCU/SNU      IMPORTANT INFORMATION:  Send all requests for admission clinical reviews, approved or denied determinations and any other requests to dedicated fax number below belonging to the campus where the patient is receiving treatment   List of dedicated fax numbers:  [address] DENIALS (Administrative/Medical Necessity) [phone]   [address]  (Maternity/NICU/Pediatrics) [phone]   Navid James [phone]   [address] [place] [address] [phone]   [address] [place] [address] [phone]   [address] [phone]   [facility]  [phone]   [address] [facility]  [address] [phone]

## 2022-04-30 LAB
BACTERIA BLD CULT: NORMAL
BACTERIA BLD CULT: NORMAL

## 2022-05-02 ENCOUNTER — PATIENT OUTREACH (OUTPATIENT)
Dept: CASE MANAGEMENT | Facility: OTHER | Age: 62
End: 2022-05-02

## 2022-05-02 ENCOUNTER — TELEPHONE (OUTPATIENT)
Dept: FAMILY MEDICINE CLINIC | Facility: CLINIC | Age: 62
End: 2022-05-02

## 2022-05-02 ENCOUNTER — TELEPHONE (OUTPATIENT)
Dept: PHYSICAL THERAPY | Facility: OTHER | Age: 62
End: 2022-05-02

## 2022-05-02 NOTE — TELEPHONE ENCOUNTER
Tried contacting  Patient several times , no answer    I will send him a my chart text tc/cma ----- Message from Nasir Mccallum MD sent at 5/2/2022  5:47 PM EDT -----  Regarding: FW: Aralvarez Coil 1960  See prior messages-Were you able to schedule appt?  ----- Message -----  From: Jennifer Subramanian LPN  Sent: 7/2/3729   1:25 PM EDT  To: Nasir Mccallum MD  Subject: Bryson Seymour: Lucas Coil 1960                         ----- Message -----  From: Hailey Schneider  Sent: 4/30/2022  10:59 AM EDT  To: Ankur Fleming [facility]  Subject: Arlice Coil 1960                           Ok good   Thank you

## 2022-05-02 NOTE — TELEPHONE ENCOUNTER
Call placed to the patient per [facility] referral     Voice message left for patient to call back  Phone number and hours of business provided  Referral Closed  Patient currently residing @ a SNF

## 2022-05-02 NOTE — PROGRESS NOTES
Chart review indicates discharge form [facility] to [facility]  Respiratory outreach discontinued at this time

## 2022-05-09 ENCOUNTER — PATIENT OUTREACH (OUTPATIENT)
Dept: CASE MANAGEMENT | Facility: HOSPITAL | Age: 62
End: 2022-05-09

## 2022-05-09 NOTE — PROGRESS NOTES
[facility] closure, ended 05/08/22  Patient remains at [facility], Questionable long term care    Episode resolved and outpatient care manager removed self from the care team

## 2022-05-10 LAB
Lab: NORMAL
MISCELLANEOUS LAB TEST RESULT: NORMAL
STONE ANALYSIS-IMP: NORMAL

## 2022-05-11 LAB — SCAN RESULT: NORMAL

## 2022-05-11 NOTE — TELEPHONE ENCOUNTER
Pt is not out of care center until 4/13 , I asked him to call back when he gets home so we can do the tcm tc/cma

## 2022-05-12 LAB — SCAN RESULT: NORMAL

## 2022-05-16 ENCOUNTER — TELEPHONE (OUTPATIENT)
Dept: PAIN MEDICINE | Facility: CLINIC | Age: 62
End: 2022-05-16

## 2022-05-16 NOTE — TELEPHONE ENCOUNTER
Pt called to cancel appmt for 5/16- pt is taking care of personal matters and will call back if he needs care

## 2022-05-17 DIAGNOSIS — Z79.4 TYPE 2 DIABETES MELLITUS WITH COMPLICATION, WITH LONG-TERM CURRENT USE OF INSULIN (HCC): ICD-10-CM

## 2022-05-17 DIAGNOSIS — E11.8 TYPE 2 DIABETES MELLITUS WITH COMPLICATION, WITH LONG-TERM CURRENT USE OF INSULIN (HCC): ICD-10-CM

## 2022-05-17 RX ORDER — LANCING DEVICE
EACH MISCELLANEOUS
Qty: 1 EACH | Refills: 0 | Status: SHIPPED | OUTPATIENT
Start: 2022-05-17

## 2022-05-19 ENCOUNTER — TELEPHONE (OUTPATIENT)
Dept: PAIN MEDICINE | Facility: CLINIC | Age: 62
End: 2022-05-19

## 2022-05-20 ENCOUNTER — TELEMEDICINE (OUTPATIENT)
Dept: FAMILY MEDICINE CLINIC | Facility: CLINIC | Age: 62
End: 2022-05-20
Payer: COMMERCIAL

## 2022-05-20 VITALS — HEIGHT: 71 IN | BODY MASS INDEX: 38.78 KG/M2 | WEIGHT: 277 LBS

## 2022-05-20 DIAGNOSIS — E08.43 DIABETES MELLITUS DUE TO UNDERLYING CONDITION WITH DIABETIC AUTONOMIC NEUROPATHY, WITH LONG-TERM CURRENT USE OF INSULIN (HCC): ICD-10-CM

## 2022-05-20 DIAGNOSIS — G47.33 OSA AND COPD OVERLAP SYNDROME (HCC): Chronic | ICD-10-CM

## 2022-05-20 DIAGNOSIS — M54.16 LUMBAR RADICULOPATHY: ICD-10-CM

## 2022-05-20 DIAGNOSIS — C91.10 CLL (CHRONIC LYMPHOCYTIC LEUKEMIA) (HCC): Primary | ICD-10-CM

## 2022-05-20 DIAGNOSIS — Z79.4 DIABETES MELLITUS DUE TO UNDERLYING CONDITION WITH DIABETIC AUTONOMIC NEUROPATHY, WITH LONG-TERM CURRENT USE OF INSULIN (HCC): ICD-10-CM

## 2022-05-20 DIAGNOSIS — Z79.4 TYPE 2 DIABETES MELLITUS WITH HYPERGLYCEMIA, WITH LONG-TERM CURRENT USE OF INSULIN (HCC): ICD-10-CM

## 2022-05-20 DIAGNOSIS — E11.65 TYPE 2 DIABETES MELLITUS WITH HYPERGLYCEMIA, WITH LONG-TERM CURRENT USE OF INSULIN (HCC): ICD-10-CM

## 2022-05-20 DIAGNOSIS — J44.9 OSA AND COPD OVERLAP SYNDROME (HCC): Chronic | ICD-10-CM

## 2022-05-20 PROCEDURE — 99213 OFFICE O/P EST LOW 20 MIN: CPT | Performed by: FAMILY MEDICINE

## 2022-05-20 RX ORDER — PEN NEEDLE, DIABETIC, SAFETY 30 GX3/16"
NEEDLE, DISPOSABLE MISCELLANEOUS
COMMUNITY
Start: 2022-05-02

## 2022-05-23 ENCOUNTER — TELEPHONE (OUTPATIENT)
Dept: FAMILY MEDICINE CLINIC | Facility: CLINIC | Age: 62
End: 2022-05-23

## 2022-05-23 ENCOUNTER — TELEMEDICINE (OUTPATIENT)
Dept: PULMONOLOGY | Facility: MEDICAL CENTER | Age: 62
End: 2022-05-23
Payer: MEDICARE

## 2022-05-23 VITALS — HEIGHT: 71 IN | WEIGHT: 277 LBS | BODY MASS INDEX: 38.78 KG/M2

## 2022-05-23 DIAGNOSIS — G47.33 OSA AND COPD OVERLAP SYNDROME (HCC): Chronic | ICD-10-CM

## 2022-05-23 DIAGNOSIS — J44.1 COPD EXACERBATION (HCC): ICD-10-CM

## 2022-05-23 DIAGNOSIS — J44.9 OSA AND COPD OVERLAP SYNDROME (HCC): Chronic | ICD-10-CM

## 2022-05-23 DIAGNOSIS — E66.2 CLASS 3 OBESITY WITH ALVEOLAR HYPOVENTILATION, SERIOUS COMORBIDITY, AND BODY MASS INDEX (BMI) OF 40.0 TO 44.9 IN ADULT (HCC): ICD-10-CM

## 2022-05-23 DIAGNOSIS — J96.11 CHRONIC RESPIRATORY FAILURE WITH HYPOXIA (HCC): Primary | Chronic | ICD-10-CM

## 2022-05-23 DIAGNOSIS — J43.2 CENTRILOBULAR EMPHYSEMA (HCC): ICD-10-CM

## 2022-05-23 DIAGNOSIS — J41.0 SIMPLE CHRONIC BRONCHITIS (HCC): Chronic | ICD-10-CM

## 2022-05-23 PROCEDURE — 99213 OFFICE O/P EST LOW 20 MIN: CPT | Performed by: NURSE PRACTITIONER

## 2022-05-23 NOTE — ASSESSMENT & PLAN NOTE
Patient has body mass index of 38 6  This contributes to his diagnosis of class 3 obesity with alveolar hypoventilation  We did discuss the need for increase in exercise and decrease in caloric intake

## 2022-05-23 NOTE — PROGRESS NOTES
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 40 8     MCV 02/07/2022 93     MCH 02/07/2022 32 2     MCHC 02/07/2022 34 6     RDW 02/07/2022 12 8     MPV 02/07/2022 9 0     Platelets 59/50/6800 188     nRBC 02/07/2022 0     Neutrophils Relative 02/07/2022 51     Immat GRANS % 02/07/2022 1     Lymphocytes Relative 02/07/2022 47 (A)    Monocytes Relative 02/07/2022 1 (A)    Eosinophils Relative 02/07/2022 0     Basophils Relative 02/07/2022 0     Neutrophils Absolute 02/07/2022 11 09 (A)    Immature Grans Absolute 02/07/2022 0 21 (A)    Lymphocytes Absolute 02/07/2022 10 22 (A)    Monocytes Absolute 02/07/2022 0 17     Eosinophils Absolute 02/07/2022 0 02     Basophils Absolute 02/07/2022 0 05     POC Glucose 02/07/2022 265 (A)    LVIDd 02/07/2022 5 20     LVIDS 02/07/2022 4 00     IVSd 02/07/2022 1 20     LVPWd 02/07/2022 1 20     FS 02/07/2022 23     MV E' Tissue Velocity Se* 02/07/2022 6     MV E' Tissue Velocity La* 02/07/2022 9     E wave deceleration time 02/07/2022 208     MV Peak E John 02/07/2022 100     MV Peak A John 02/07/2022 0     RVID d 02/07/2022 3 5     LA size 02/07/2022 5 6     JAMIL A4C 02/07/2022 33 3     RAA A4C 02/07/2022 24 4     MV stenosis pressure 1/2* 02/07/2022 0     Triscuspid Valve Regurgi* 02/07/2022 29 0     Ao root 02/07/2022 3 40     Tricuspid valve peak reg* 02/07/2022 2 69     Left ventricular stroke * 02/07/2022 61 00     LEFT VENTRICLE SYSTOLIC * 08/82/5426 71     LV DIASTOLIC VOLUME (MOD* 48/80/1308 132     ZLVPWD 02/07/2022 2 66     ZLVIDS 02/07/2022 -15 24     ZIVSD 02/07/2022 2 54     LV EF 02/07/2022 55     Sodium 02/07/2022 128 (A)    Potassium 02/07/2022 4 9     Chloride 02/07/2022 90 (A)    CO2 02/07/2022 24     ANION GAP 02/07/2022 14 (A)    BUN 02/07/2022 46 (A)    Creatinine 02/07/2022 1 51 (A)    Glucose 02/07/2022 378 (A)    Calcium 02/07/2022 10 5 (A)    AST 02/07/2022 27     ALT 02/07/2022 57     Alkaline Phosphatase 02/07/2022 77     Total Protein 02/07/2022 8 1     Albumin 02/07/2022 4 4     Total Bilirubin 02/07/2022 0 61     eGFR 02/07/2022 48     Ventricular Rate 02/06/2022 110     QRSD Interval 02/06/2022 90     QT Interval 02/06/2022 300     QTC Interval 02/06/2022 406     QRS Axis 02/06/2022 77     T Wave Axis 02/06/2022 -39     POC Glucose 02/07/2022 369 (A)    POC Glucose 02/07/2022 253 (A)    Procalcitonin 02/07/2022 0 10     Procalcitonin 02/08/2022 0 09     POC Glucose 02/07/2022 293 (A)    POC Glucose 02/08/2022 238 (A)    Magnesium 02/08/2022 1 5 (A)    WBC 02/08/2022 25 95 (A)    RBC 02/08/2022 3 99     Hemoglobin 02/08/2022 12 9     Hematocrit 02/08/2022 37 7     MCV 02/08/2022 95     MCH 02/08/2022 32 3     MCHC 02/08/2022 34 2     RDW 02/08/2022 13 1     MPV 02/08/2022 9 9     Platelets 67/30/4468 171     nRBC 02/08/2022 0     POC Glucose 02/08/2022 180 (A)    Sodium 02/08/2022 130 (A)    Potassium 02/08/2022 4 6     Chloride 02/08/2022 96 (A)    CO2 02/08/2022 29     ANION GAP 02/08/2022 5     BUN 02/08/2022 38 (A)    Creatinine 02/08/2022 1 09     Glucose 02/08/2022 339 (A)    Calcium 02/08/2022 8 9     eGFR 02/08/2022 72     POC Glucose 02/08/2022 327 (A)   Admission on 12/09/2021, Discharged on 12/09/2021   Component Date Value    SARS-CoV-2 12/09/2021 Negative     INFLUENZA A PCR 12/09/2021 Negative     INFLUENZA B PCR 12/09/2021 Negative     RSV PCR 12/09/2021 Negative    Admission on 11/25/2021, Discharged on 11/29/2021   Component Date Value    Sodium 11/26/2021 139     Potassium 11/26/2021 4 3     Chloride 11/26/2021 102     CO2 11/26/2021 27     ANION GAP 11/26/2021 10     BUN 11/26/2021 24     Creatinine 11/26/2021 1 04     Glucose 11/26/2021 136     Calcium 11/26/2021 9 2     AST 11/26/2021 27     ALT 11/26/2021 39     Alkaline Phosphatase 11/26/2021 73     Total Protein 11/26/2021 6 1 (A)    Albumin 11/26/2021 3 5     Total Bilirubin 11/26/2021 0 32     eGFR 11/26/2021 77     WBC 11/26/2021 9 43     RBC 11/26/2021 3 80 (A)    Hemoglobin 11/26/2021 12 5     Hematocrit 11/26/2021 36 2 (A)    MCV 11/26/2021 95     MCH 11/26/2021 32 9     MCHC 11/26/2021 34 5     RDW 11/26/2021 12 2     MPV 11/26/2021 9 4     Platelets 38/70/3520 146 (A)    nRBC 11/26/2021 0     Neutrophils Relative 11/26/2021 34 (A)    Immat GRANS % 11/26/2021 0     Lymphocytes Relative 11/26/2021 60 (A)    Monocytes Relative 11/26/2021 5     Eosinophils Relative 11/26/2021 1     Basophils Relative 11/26/2021 0     Neutrophils Absolute 11/26/2021 3 22     Immature Grans Absolute 11/26/2021 0 03     Lymphocytes Absolute 11/26/2021 5 58 (A)    Monocytes Absolute 11/26/2021 0 50     Eosinophils Absolute 11/26/2021 0 07     Basophils Absolute 11/26/2021 0 03     POC Glucose 11/26/2021 126     POC Glucose 11/26/2021 298 (A)    POC Glucose 11/26/2021 241 (A)    Vitamin B-12 11/26/2021 364     TSH 3RD GENERATON 11/26/2021 1 718     SARS-CoV-2 11/26/2021 Negative     INFLUENZA A PCR 11/26/2021 Negative     INFLUENZA B PCR 11/26/2021 Negative     RSV PCR 11/26/2021 Negative     POC Glucose 11/26/2021 189 (A)    POC Glucose 11/27/2021 182 (A)    WBC 11/27/2021 10 16     RBC 11/27/2021 4 23     Hemoglobin 11/27/2021 13 6     Hematocrit 11/27/2021 40 6     MCV 11/27/2021 96     MCH 11/27/2021 32 2     MCHC 11/27/2021 33 5  RDW 11/27/2021 12 3     MPV 11/27/2021 9 7     Platelets 26/20/6893 165     nRBC 11/27/2021 0     Neutrophils Relative 11/27/2021 36 (A)    Immat GRANS % 11/27/2021 1     Lymphocytes Relative 11/27/2021 57 (A)    Monocytes Relative 11/27/2021 5     Eosinophils Relative 11/27/2021 1     Basophils Relative 11/27/2021 0     Neutrophils Absolute 11/27/2021 3 70     Immature Grans Absolute 11/27/2021 0 05     Lymphocytes Absolute 11/27/2021 5 79 (A)    Monocytes Absolute 11/27/2021 0 48     Eosinophils Absolute 11/27/2021 0 11     Basophils Absolute 11/27/2021 0 03     Sodium 11/27/2021 138     Potassium 11/27/2021 4 1     Chloride 11/27/2021 98 (A)    CO2 11/27/2021 30     ANION GAP 11/27/2021 10     BUN 11/27/2021 21     Creatinine 11/27/2021 1 07     Glucose 11/27/2021 193 (A)    Calcium 11/27/2021 9 6     eGFR 11/27/2021 74     POC Glucose 11/27/2021 187 (A)    POC Glucose 11/27/2021 225 (A)    POC Glucose 11/27/2021 221 (A)    POC Glucose 11/27/2021 209 (A)    POC Glucose 11/28/2021 179 (A)    POC Glucose 11/28/2021 155 (A)    POC Glucose 11/28/2021 269 (A)    POC Glucose 11/28/2021 348 (A)    POC Glucose 11/28/2021 293 (A)    POC Glucose 11/29/2021 196 (A)    POC Glucose 11/29/2021 278 (A)    POC Glucose 11/29/2021 212 (A)       Past Surgical History:   Procedure Laterality Date    BACK SURGERY      CARDIAC CATHETERIZATION      no stents    CHOLECYSTECTOMY      COLONOSCOPY N/A 1/4/2017    Procedure: COLONOSCOPY;  Surgeon: Joss Smith MD;  Location: [facility] GI LAB; Service:     COLONOSCOPY N/A 9/11/2017    Procedure: COLONOSCOPY;  Surgeon: Mack Baez MD;  Location: [facility] GI LAB; Service: Gastroenterology    EPIDURAL BLOCK INJECTION Left 4/15/2022    Procedure: L5 S1 TRANSFORAMINAL epidural steroid injection ([id number]);   Surgeon: Evelio Goetz MD;  Location: [facility] MAIN OR;  Service: Pain Management     ESOPHAGOGASTRODUODENOSCOPY N/A 3/15/2017    Procedure: ESOPHAGOGASTRODUODENOSCOPY (EGD) WITH BOTOX;  Surgeon: Chaitanya Aguilar MD;  Location: [facility] LAB; Service:     ESOPHAGOGASTRODUODENOSCOPY N/A 1/4/2017    Procedure: ESOPHAGOGASTRODUODENOSCOPY (EGD); Surgeon: Chaitanya Aguilar MD;  Location: [facility] GI LAB; Service:     FL INJECTION LEFT ELBOW (NON ARTHROGRAM)  4/15/2022    HERNIA REPAIR Left     inguinal    INCISION AND DRAINAGE OF WOUND Left 1/13/2016    Procedure: INCISION AND DRAINAGE (I&D) LEFT GROIN ABSCESS DESCENDING TO PERIRECTAL REGION;  Surgeon: Yumi Hudson MD;  Location: [address] [place] [address];  Service:    [facility] ARTHROSCOPY Right 2013    NM EGD TRANSORAL BIOPSY SINGLE/MULTIPLE N/A 9/20/2017    Procedure: ESOPHAGOGASTRODUODENOSCOPY (EGD); Surgeon: Chaitanya Aguilar MD;  Location: [facility] GI LAB; Service: Gastroenterology    NM EGD TRANSORAL BIOPSY SINGLE/MULTIPLE N/A 10/10/2018    Procedure: ESOPHAGOGASTRODUODENOSCOPY (EGD); Surgeon: Chaitanya Aguilar MD;  Location: [facility] GI LAB; Service: Gastroenterology        Family History   Problem Relation Age of Onset    Other Mother         GI complications of surgery     Heart disease Father         exp MI age 64    Heart disease Sister 61        MI    Diabetes Paternal Grandmother     Diabetes Family         Grandparent     Cancer Paternal Uncle         colon    Stroke Neg Hx     Thyroid disease Neg Hx         Diagnostics:  I have personally reviewed pertinent films in PACS with a Radiologist     Office Spirometry Results:     ESS:    CT chest abdomen pelvis w contrast    Result Date: 4/25/2022  Narrative: CT CHEST, ABDOMEN AND PELVIS WITH IV CONTRAST INDICATION:   abdominal/back pain  COMPARISON:  None  TECHNIQUE: CT examination of the chest, abdomen and pelvis was performed  Axial, sagittal, and coronal 2D reformatted images were created from the source data and submitted for interpretation  Radiation dose length product (DLP) for this visit:  94 20 56 mGy-cm     This examination, like all CT scans performed in the [facility]  [facility], was performed utilizing techniques to minimize radiation dose exposure, including the use of iterative reconstruction and automated exposure control  IV Contrast:  100 mL of iohexol (OMNIPAQUE) Enteric Contrast: Enteric contrast was administered  FINDINGS: CHEST LUNGS:  Lungs are clear  There is no tracheal or endobronchial lesion  PLEURA:  Unremarkable  HEART/GREAT VESSELS: Heavy atherosclerotic coronary artery calcification is noted  Heart is enlarged  No thoracic aortic aneurysm  MEDIASTINUM AND CHRISTOPHER:  Unremarkable  CHEST WALL AND LOWER NECK:  Unremarkable  ABDOMEN LIVER/BILIARY TREE:  Unremarkable  GALLBLADDER:  Gallbladder is surgically absent  SPLEEN:  Unremarkable  PANCREAS:  Unremarkable  ADRENAL GLANDS:  Unremarkable  KIDNEYS/URETERS:  No hydronephrosis or urinary tract calculus  One or more sharply circumscribed subcentimeter renal hypodensities are present, too small to accurately characterize, and statistically most likely benign findings  According to recent literature (Radiology 2019) no further workup of these findings is recommended  STOMACH AND BOWEL:  Liquid stool throughout the colon with mild pericolonic inflammatory change    APPENDIX:  No findings to suggest appendicitis  ABDOMINOPELVIC CAVITY:  No ascites  No pneumoperitoneum  No lymphadenopathy  VESSELS:  Unremarkable for patient's age  PELVIS REPRODUCTIVE ORGANS:  Unremarkable for patient's age  URINARY BLADDER:  Unremarkable  ABDOMINAL WALL/INGUINAL REGIONS:  Unremarkable  OSSEOUS STRUCTURES:  No acute fracture or destructive osseous lesion  Impression: No evidence of acute intrathoracic pathology  Findings consistent with colitis Workstation performed: [id number]     CT recon only lumbar spine (No Charge)    Result Date: 4/25/2022  Narrative: CT LUMBAR SPINE INDICATION:   lumbar back pain  COMPARISON:  MRI dated 11/26/2021, lumbar spine   TECHNIQUE: Axial CT examination of the lumbar spine was obtained utilizing reconstructed images from CT of the chest, abdomen and pelvis performed the same day  Images were reformatted in the sagittal and coronal planes  This examination, like all CT scans performed in the [facility], was performed utilizing techniques to minimize radiation dose exposure, including the use of iterative reconstruction and automated exposure control  FINDINGS: ALIGNMENT: Normal alignment of lumbar vertebral bodies  No spondylolitheisis or spondylolysis  No scoliosis  VERTEBRAL BODIES: No fracture  No acute osseous abnormality  DEGENERATIVE CHANGES: Minor lower thoracic endplate degenerative change with anterior and lateral endplate osteophyte formation  No canal stenosis or foraminal narrowing  Minor annular bulging at the L2-3 and L3-4 levels without significant canal stenosis or foraminal nerve impingement  The L4-5 disc space is fused with no disc herniation or osteophyte formation  The facet joints are fused as well  No foraminal nerve impingement  At L5-S1 there is anterior osteophyte formation with minor vacuum phenomenon and endplate hypertrophic change primarily within the left neural foramen  Mild left foraminal narrowing  PREVERTEBRAL AND PARASPINAL SOFT TISSUES: See separate CT chest, abdomen and pelvis report, from which this examination was reconstructed, already dictated  Impression: Noncompressive thoracolumbar degenerative change  No acute osseous abnormality   Workstation performed: [id number]   Answers for HPI/ROS submitted by the patient on 5/18/2022  Do you have chest tightness?: Yes  Do you have difficulty breathing?: Yes  Chronicity: chronic  When did you first notice your symptoms?: more than 1 year ago  How often do your symptoms occur?: intermittently  Since you first noticed this problem, how has it changed?: unchanged  Do you have shortness of breath that occurs with effort or exertion?: Yes  Do you have ear congestion?: No  Do you have heartburn?: No  Do you have fatigue?: Yes  Do you have nasal congestion?: No  Do you have shortness of breath when lying flat?: Yes  Do you have shortness of breath when you wake up?: Yes  Do you have sweats?: No  Have you experienced weight loss?: Yes  Which of the following makes your symptoms worse?: any activity  Which of the following makes your symptoms better?: change in position, OTC cough suppressant, OTC inhaler, steroid inhaler      Virtual Brief Visit    Patient is located in the following state in which I hold an active license [place]      Assessment/Plan:    Problem List Items Addressed This Visit        Respiratory    SHAVONNE and COPD overlap syndrome  (Chronic)     Patient is using BIPAP  Settings are 12/5 with 3 L of supplemental oxygen  We were unable to have compliance report generated  He does continue to use an benefit  Chronic respiratory failure with hypoxia (HCC) - Primary (Chronic)     Patient is using an benefit from supplemental oxygen  He was recently hospitalized and discharged with 3 units of supplemental oxygen at rest and with ambulation is using 4-6 L of oxygen  Britney Luna does monitor his oxygen saturation  He does adjust his oxygen accordingly monitoring his own pulse oximeter  His goal is to have O2 saturations over 91 or 92%  This will be readdressed at his next office visit  Overall his chronic respiratory failure is corrected with supplemental oxygen           Simple chronic bronchitis (HCC) (Chronic)     Britney Luna had pulmonary function test done in May of 2019  Forced vital capacity was 2 2 L or 49% of predicted, FEV1 was 1 58 L or 46% obstruction ratio is 72%  Britney Luna continues to use Trelegy 100 mcg 1 puff daily  He has the availability using nebulizer as well according to patient he uses this once or twice daily  He did not need any additional medication ordered    He has recently change pharmacy and I have noted this as well             COPD (chronic obstructive pulmonary disease) ([facility] [address] )       Other    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 [facility])     Patient has body mass index of 38 6  This contributes to his diagnosis of class 3 obesity with alveolar hypoventilation  We did discuss the need for increase in exercise and decrease in caloric intake  Other Visit Diagnoses     COPD exacerbation [facility])              Recent Visits  Date Type Provider Dept   05/20/22 Telemedicine Barbie Sampson MD [facility] recent visits within past 7 days and meeting all other requirements  Today's Visits  Date Type Provider Dept   05/23/22 Telemedicine [facility]   Showing today's visits and meeting all other requirements  Future Appointments  No visits were found meeting these conditions    Showing future appointments within next 150 days and meeting all other requirements         I spent 20 minutes directly with the patient during this visit

## 2022-05-23 NOTE — ASSESSMENT & PLAN NOTE
Deborahbola Kaminski had pulmonary function test done in May of 2019  Forced vital capacity was 2 2 L or 49% of predicted, FEV1 was 1 58 L or 46% obstruction ratio is 72%  Jaylen Gordy continues to use Trelegy 100 mcg 1 puff daily  He has the availability using nebulizer as well according to patient he uses this once or twice daily  He did not need any additional medication ordered    He has recently change pharmacy and I have noted this as well

## 2022-05-23 NOTE — ASSESSMENT & PLAN NOTE
Patient is using an benefit from supplemental oxygen  He was recently hospitalized and discharged with 3 units of supplemental oxygen at rest and with ambulation is using 4-6 L of oxygen  Britney Luna does monitor his oxygen saturation  He does adjust his oxygen accordingly monitoring his own pulse oximeter  His goal is to have O2 saturations over 91 or 92%  This will be readdressed at his next office visit    Overall his chronic respiratory failure is corrected with supplemental oxygen

## 2022-05-23 NOTE — ASSESSMENT & PLAN NOTE
Patient is using BIPAP  Settings are 12/5 with 3 L of supplemental oxygen  We were unable to have compliance report generated  He does continue to use an benefit

## 2022-05-24 RX ORDER — INSULIN GLARGINE 100 [IU]/ML
INJECTION, SOLUTION SUBCUTANEOUS
Qty: 105 ML | Refills: 3 | Status: ON HOLD | OUTPATIENT
Start: 2022-05-24 | End: 2022-06-21 | Stop reason: SDUPTHER

## 2022-05-24 RX ORDER — FLASH GLUCOSE SCANNING READER
EACH MISCELLANEOUS
COMMUNITY

## 2022-05-25 ENCOUNTER — TELEPHONE (OUTPATIENT)
Dept: HEMATOLOGY ONCOLOGY | Facility: CLINIC | Age: 62
End: 2022-05-25

## 2022-05-25 NOTE — TELEPHONE ENCOUNTER
Faxed hospiital consult note from 4/27/2022 to [facility] at [phone] to support genetic testing

## 2022-05-27 ENCOUNTER — TELEPHONE (OUTPATIENT)
Dept: FAMILY MEDICINE CLINIC | Facility: CLINIC | Age: 62
End: 2022-05-27

## 2022-05-27 DIAGNOSIS — Z79.4 TYPE 2 DIABETES MELLITUS WITH HYPERGLYCEMIA, WITH LONG-TERM CURRENT USE OF INSULIN (HCC): Primary | ICD-10-CM

## 2022-05-27 DIAGNOSIS — E11.65 TYPE 2 DIABETES MELLITUS WITH HYPERGLYCEMIA, WITH LONG-TERM CURRENT USE OF INSULIN (HCC): Primary | ICD-10-CM

## 2022-05-27 RX ORDER — FLASH GLUCOSE SENSOR
1 KIT MISCELLANEOUS
Qty: 6 EACH | Refills: 1 | Status: SHIPPED | OUTPATIENT
Start: 2022-05-27 | End: 2023-09-13 | Stop reason: SDUPTHER

## 2022-05-27 NOTE — TELEPHONE ENCOUNTER
Dr Tonya Michelle:    Zackary Barros from [facility] called to let you know that no appointments have been scheduled for home care due to patient not returning their calls

## 2022-06-01 ENCOUNTER — TELEPHONE (OUTPATIENT)
Dept: FAMILY MEDICINE CLINIC | Facility: CLINIC | Age: 62
End: 2022-06-01

## 2022-06-01 NOTE — TELEPHONE ENCOUNTER
[facility] said patient notified them yesterday his [employer] insurance will  today  She was unable to reach him on phone call was blocked) and left voice mail for his personal contacts  If he is not seen within the next few days they will have to release him from care

## 2022-06-02 NOTE — TELEPHONE ENCOUNTER
FYI: Myrna Govea called and stated that [facility] is going to discharge patient due to the fact that they cannot get a hold of him

## 2022-06-02 NOTE — TELEPHONE ENCOUNTER
I spoke to pts emergency contact who stated that she has received many calls for pt and Jack Jeffries has also been trying to get a hold of pt for 2 days and at this point she is going to call the police to do a well check

## 2022-06-03 NOTE — TELEPHONE ENCOUNTER
Spoke with Arthur Hernandez ,he is well and said thanks for checking in on him he was having phone issues tc/cma

## 2022-06-03 NOTE — TELEPHONE ENCOUNTER
Ok Ferrera from [facility] called stating that she had an appointment with patient on 5/31 and discussed long term home care  Since her visit nobody is able to get in contact with patient

## 2022-06-13 ENCOUNTER — TELEPHONE (OUTPATIENT)
Dept: HEMATOLOGY ONCOLOGY | Facility: MEDICAL CENTER | Age: 62
End: 2022-06-13

## 2022-06-13 ENCOUNTER — TELEPHONE (OUTPATIENT)
Dept: HEMATOLOGY ONCOLOGY | Facility: CLINIC | Age: 62
End: 2022-06-13

## 2022-06-13 DIAGNOSIS — D72.829 LEUKOCYTOSIS, UNSPECIFIED TYPE: Primary | ICD-10-CM

## 2022-06-13 NOTE — TELEPHONE ENCOUNTER
CALL RETURN FORM   Reason for patient call? Patient is calling to see if he can have the office try to get him in sooner than 7/13 for his hospital follow up  Patient wants to see Dr Reji Morales at [facility]  He also wants to know if he can get help with setting up transportation due to him having oxygen now   Patient's primary oncologist? Dr Reji Morales   Name of person the patient was calling for? Robe Peterson    Any additional information to add, if applicable? Best call back number is [phone]   Informed patient that the message will be forwarded to the team and someone will get back to them as soon as possible    Did you relay this information to the patient?  yes

## 2022-06-13 NOTE — TELEPHONE ENCOUNTER
CBC entered for patient to obtain  Patient aware that [facility] will need documentation signed prior to appt  Documentation being sent in mail

## 2022-06-13 NOTE — TELEPHONE ENCOUNTER
Patient requesting appt for hospital f/u   No sooner appts available  Email sent to [employer] to arrange for transportation to appt  Patient aware

## 2022-06-13 NOTE — TELEPHONE ENCOUNTER
[facility] packet has been mailed to [employer] to be completed and returned to the office  Once the office has received the returned packet, [facility] will be scheduled for Dr Iraida kincaid

## 2022-06-14 ENCOUNTER — APPOINTMENT (OUTPATIENT)
Dept: LAB | Facility: HOSPITAL | Age: 62
DRG: 871 | End: 2022-06-14
Payer: MEDICARE

## 2022-06-14 DIAGNOSIS — D72.829 LEUKOCYTOSIS, UNSPECIFIED TYPE: ICD-10-CM

## 2022-06-14 LAB
BASOPHILS # BLD AUTO: 0.03 THOUSANDS/ΜL (ref 0–0.1)
BASOPHILS NFR BLD AUTO: 0 % (ref 0–1)
EOSINOPHIL # BLD AUTO: 0.14 THOUSAND/ΜL (ref 0–0.61)
EOSINOPHIL NFR BLD AUTO: 1 % (ref 0–6)
ERYTHROCYTE [DISTWIDTH] IN BLOOD BY AUTOMATED COUNT: 13.7 % (ref 11.6–15.1)
HCT VFR BLD AUTO: 43.2 % (ref 36.5–49.3)
HGB BLD-MCNC: 14 G/DL (ref 12–17)
IMM GRANULOCYTES # BLD AUTO: 0.05 THOUSAND/UL (ref 0–0.2)
IMM GRANULOCYTES NFR BLD AUTO: 1 % (ref 0–2)
LYMPHOCYTES # BLD AUTO: 4.5 THOUSANDS/ΜL (ref 0.6–4.47)
LYMPHOCYTES NFR BLD AUTO: 45 % (ref 14–44)
MCH RBC QN AUTO: 29.7 PG (ref 26.8–34.3)
MCHC RBC AUTO-ENTMCNC: 32.4 G/DL (ref 31.4–37.4)
MCV RBC AUTO: 92 FL (ref 82–98)
MONOCYTES # BLD AUTO: 0.66 THOUSAND/ΜL (ref 0.17–1.22)
MONOCYTES NFR BLD AUTO: 7 % (ref 4–12)
NEUTROPHILS # BLD AUTO: 4.6 THOUSANDS/ΜL (ref 1.85–7.62)
NEUTS SEG NFR BLD AUTO: 46 % (ref 43–75)
NRBC BLD AUTO-RTO: 0 /100 WBCS
PLATELET # BLD AUTO: 187 THOUSANDS/UL (ref 149–390)
PMV BLD AUTO: 9.6 FL (ref 8.9–12.7)
RBC # BLD AUTO: 4.71 MILLION/UL (ref 3.88–5.62)
WBC # BLD AUTO: 9.98 THOUSAND/UL (ref 4.31–10.16)

## 2022-06-14 PROCEDURE — 36415 COLL VENOUS BLD VENIPUNCTURE: CPT

## 2022-06-14 PROCEDURE — 85025 COMPLETE CBC W/AUTO DIFF WBC: CPT

## 2022-06-16 ENCOUNTER — TELEPHONE (OUTPATIENT)
Dept: PULMONOLOGY | Facility: CLINIC | Age: 62
End: 2022-06-16

## 2022-06-16 NOTE — TELEPHONE ENCOUNTER
Patient calling stating he has bronchitis  It was hard to hear anything else as he couldn't speak without coughing   Please advise [phone]

## 2022-06-16 NOTE — TELEPHONE ENCOUNTER
Navjot Zuñigaone would like a return call regarding     What is the reason for the call/chief complaint? THINKS HE HAS BRONCHITIS, HAS SOB     What additional symptoms are present or absent? SOB, yes   Are they on O2? Yes, describe: 3 LITERS AT REST, 4-6 LITERS WITH EXERTION  Pulse O2 [id number] When [id number]   chest pain/tightness, yes  wheezing, yes  Cough, yes  mucous production,no  What color is it   fevers/chills, no  weight gain, no  Swelling no  Pain no, does it hurt when breathing or all the time? WHEN COUGHING     When did they start/how long have they been going on? YESTERDAY    Constant or intermittent; if intermittent describe yes? What makes it better or worse? NOTHING IS HELPING     Have you been exposed to anyone that is sick? No    Have you been tested for COVID recently? no    Check current pulmonary medications TRELEGY, RESCUE INHALER AND NEB EVERY 4 HRS   frequency of use DAILY     Have they had any other treatment or testing for this problem elsewhere? NO    Recent steroids? No    Recent Antibiotics? No     Last office visit? 5/23/22    Patient pharmacy?  [facility]     30 or 90 day supply 30    HAS HAD PREDNISONE AND ANTIBIOTICS FOR THIS IN PAST BUT HAS NONE NOW

## 2022-06-17 ENCOUNTER — APPOINTMENT (EMERGENCY)
Dept: RADIOLOGY | Facility: HOSPITAL | Age: 62
DRG: 871 | End: 2022-06-17
Payer: MEDICARE

## 2022-06-17 ENCOUNTER — HOSPITAL ENCOUNTER (INPATIENT)
Facility: HOSPITAL | Age: 62
LOS: 4 days | Discharge: HOME HEALTH | DRG: 871 | End: 2022-06-21
Attending: EMERGENCY MEDICINE | Admitting: INTERNAL MEDICINE
Payer: MEDICARE

## 2022-06-17 DIAGNOSIS — J18.9 PNEUMONIA: ICD-10-CM

## 2022-06-17 DIAGNOSIS — E11.10 DIABETIC KETOACIDOSIS WITHOUT COMA ASSOCIATED WITH TYPE 2 DIABETES MELLITUS (HCC): Primary | ICD-10-CM

## 2022-06-17 DIAGNOSIS — A41.9 SEPSIS (HCC): ICD-10-CM

## 2022-06-17 DIAGNOSIS — E11.65 UNCONTROLLED TYPE 2 DIABETES MELLITUS WITH HYPERGLYCEMIA (HCC): Chronic | ICD-10-CM

## 2022-06-17 DIAGNOSIS — R65.10 SIRS (SYSTEMIC INFLAMMATORY RESPONSE SYNDROME) (HCC): ICD-10-CM

## 2022-06-17 DIAGNOSIS — E11.10 DKA (DIABETIC KETOACIDOSIS) (HCC): ICD-10-CM

## 2022-06-17 DIAGNOSIS — E11.65 TYPE 2 DIABETES MELLITUS WITH HYPERGLYCEMIA, WITH LONG-TERM CURRENT USE OF INSULIN (HCC): ICD-10-CM

## 2022-06-17 DIAGNOSIS — M62.81 MUSCLE WEAKNESS (GENERALIZED): ICD-10-CM

## 2022-06-17 DIAGNOSIS — Z79.4 TYPE 2 DIABETES MELLITUS WITH HYPERGLYCEMIA, WITH LONG-TERM CURRENT USE OF INSULIN (HCC): ICD-10-CM

## 2022-06-17 DIAGNOSIS — J18.9 PNEUMONIA OF RIGHT LOWER LOBE DUE TO INFECTIOUS ORGANISM: ICD-10-CM

## 2022-06-17 LAB
2HR DELTA HS TROPONIN: 1 NG/L
ALBUMIN SERPL BCP-MCNC: 3.9 G/DL (ref 3.5–5)
ALP SERPL-CCNC: 86 U/L (ref 46–116)
ALT SERPL W P-5'-P-CCNC: 25 U/L (ref 12–78)
ANION GAP SERPL CALCULATED.3IONS-SCNC: 14 MMOL/L (ref 4–13)
ANION GAP SERPL CALCULATED.3IONS-SCNC: 14 MMOL/L (ref 4–13)
ANION GAP SERPL CALCULATED.3IONS-SCNC: 22 MMOL/L (ref 4–13)
ANION GAP SERPL CALCULATED.3IONS-SCNC: 9 MMOL/L (ref 4–13)
APTT PPP: 25 SECONDS (ref 23–37)
AST SERPL W P-5'-P-CCNC: 12 U/L (ref 5–45)
BACTERIA UR QL AUTO: NORMAL /HPF
BASE EX.OXY STD BLDV CALC-SCNC: 85.3 % (ref 60–80)
BASE EXCESS BLDV CALC-SCNC: -3.1 MMOL/L
BASOPHILS # BLD MANUAL: 0 THOUSAND/UL (ref 0–0.1)
BASOPHILS NFR MAR MANUAL: 0 % (ref 0–1)
BETA-HYDROXYBUTYRATE: 2.7 MMOL/L
BILIRUB SERPL-MCNC: 1.36 MG/DL (ref 0.2–1)
BILIRUB UR QL STRIP: NEGATIVE
BUN SERPL-MCNC: 20 MG/DL (ref 5–25)
BUN SERPL-MCNC: 21 MG/DL (ref 5–25)
BUN SERPL-MCNC: 29 MG/DL (ref 5–25)
BUN SERPL-MCNC: 31 MG/DL (ref 5–25)
CALCIUM SERPL-MCNC: 6.1 MG/DL (ref 8.3–10.1)
CALCIUM SERPL-MCNC: 8.5 MG/DL (ref 8.3–10.1)
CALCIUM SERPL-MCNC: 9.3 MG/DL (ref 8.3–10.1)
CALCIUM SERPL-MCNC: 9.9 MG/DL (ref 8.3–10.1)
CARDIAC TROPONIN I PNL SERPL HS: 12 NG/L
CARDIAC TROPONIN I PNL SERPL HS: 13 NG/L
CHLORIDE SERPL-SCNC: 103 MMOL/L (ref 100–108)
CHLORIDE SERPL-SCNC: 85 MMOL/L (ref 100–108)
CHLORIDE SERPL-SCNC: 89 MMOL/L (ref 100–108)
CHLORIDE SERPL-SCNC: 95 MMOL/L (ref 100–108)
CLARITY UR: CLEAR
CO2 SERPL-SCNC: 18 MMOL/L (ref 21–32)
CO2 SERPL-SCNC: 26 MMOL/L (ref 21–32)
CO2 SERPL-SCNC: 27 MMOL/L (ref 21–32)
CO2 SERPL-SCNC: 29 MMOL/L (ref 21–32)
COLOR UR: YELLOW
CREAT SERPL-MCNC: 0.77 MG/DL (ref 0.6–1.3)
CREAT SERPL-MCNC: 0.87 MG/DL (ref 0.6–1.3)
CREAT SERPL-MCNC: 1.29 MG/DL (ref 0.6–1.3)
CREAT SERPL-MCNC: 1.33 MG/DL (ref 0.6–1.3)
EOSINOPHIL # BLD MANUAL: 0 THOUSAND/UL (ref 0–0.4)
EOSINOPHIL NFR BLD MANUAL: 0 % (ref 0–6)
ERYTHROCYTE [DISTWIDTH] IN BLOOD BY AUTOMATED COUNT: 13.7 % (ref 11.6–15.1)
FLUAV RNA RESP QL NAA+PROBE: NEGATIVE
FLUBV RNA RESP QL NAA+PROBE: NEGATIVE
GFR SERPL CREATININE-BSD FRML MDRD: 56 ML/MIN/1.73SQ M
GFR SERPL CREATININE-BSD FRML MDRD: 59 ML/MIN/1.73SQ M
GFR SERPL CREATININE-BSD FRML MDRD: 92 ML/MIN/1.73SQ M
GFR SERPL CREATININE-BSD FRML MDRD: 97 ML/MIN/1.73SQ M
GLUCOSE SERPL-MCNC: 236 MG/DL (ref 65–140)
GLUCOSE SERPL-MCNC: 245 MG/DL (ref 65–140)
GLUCOSE SERPL-MCNC: 326 MG/DL (ref 65–140)
GLUCOSE SERPL-MCNC: 328 MG/DL (ref 65–140)
GLUCOSE SERPL-MCNC: 330 MG/DL (ref 65–140)
GLUCOSE SERPL-MCNC: 330 MG/DL (ref 65–140)
GLUCOSE SERPL-MCNC: 337 MG/DL (ref 65–140)
GLUCOSE SERPL-MCNC: 487 MG/DL (ref 65–140)
GLUCOSE SERPL-MCNC: 539 MG/DL (ref 65–140)
GLUCOSE SERPL-MCNC: 600 MG/DL (ref 65–140)
GLUCOSE SERPL-MCNC: >500 MG/DL (ref 65–140)
GLUCOSE UR STRIP-MCNC: ABNORMAL MG/DL
HCO3 BLDV-SCNC: 20.6 MMOL/L (ref 24–30)
HCT VFR BLD AUTO: 42.1 % (ref 36.5–49.3)
HGB BLD-MCNC: 14.2 G/DL (ref 12–17)
HGB UR QL STRIP.AUTO: NEGATIVE
INR PPP: 1.05 (ref 0.84–1.19)
KETONES UR STRIP-MCNC: ABNORMAL MG/DL
L PNEUMO1 AG UR QL IA.RAPID: NEGATIVE
LACTATE SERPL-SCNC: 2 MMOL/L (ref 0.5–2)
LACTATE SERPL-SCNC: 2.5 MMOL/L (ref 0.5–2)
LACTATE SERPL-SCNC: 2.8 MMOL/L (ref 0.5–2)
LEUKOCYTE ESTERASE UR QL STRIP: ABNORMAL
LYMPHOCYTES # BLD AUTO: 20 % (ref 14–44)
LYMPHOCYTES # BLD AUTO: 4.28 THOUSAND/UL (ref 0.6–4.47)
MAGNESIUM SERPL-MCNC: 1.5 MG/DL (ref 1.6–2.6)
MAGNESIUM SERPL-MCNC: 2.7 MG/DL (ref 1.6–2.6)
MCH RBC QN AUTO: 30.1 PG (ref 26.8–34.3)
MCHC RBC AUTO-ENTMCNC: 33.7 G/DL (ref 31.4–37.4)
MCV RBC AUTO: 89 FL (ref 82–98)
MONOCYTES # BLD AUTO: 0.86 THOUSAND/UL (ref 0–1.22)
MONOCYTES NFR BLD: 4 % (ref 4–12)
NEUTROPHILS # BLD MANUAL: 16.27 THOUSAND/UL (ref 1.85–7.62)
NEUTS BAND NFR BLD MANUAL: 6 % (ref 0–8)
NEUTS SEG NFR BLD AUTO: 70 % (ref 43–75)
NITRITE UR QL STRIP: NEGATIVE
NON-SQ EPI CELLS URNS QL MICRO: NORMAL /HPF
NT-PROBNP SERPL-MCNC: 200 PG/ML
O2 CT BLDV-SCNC: 16.8 ML/DL
OTHER STN SPEC: NORMAL
PCO2 BLDV: 32.9 MM HG (ref 42–50)
PH BLDV: 7.41 [PH] (ref 7.3–7.4)
PH UR STRIP.AUTO: 5 [PH]
PHOSPHATE SERPL-MCNC: 1.9 MG/DL (ref 2.3–4.1)
PHOSPHATE SERPL-MCNC: 2.4 MG/DL (ref 2.3–4.1)
PLATELET # BLD AUTO: 254 THOUSANDS/UL (ref 149–390)
PLATELET BLD QL SMEAR: ADEQUATE
PMV BLD AUTO: 9.4 FL (ref 8.9–12.7)
PO2 BLDV: 55.6 MM HG (ref 35–45)
POTASSIUM SERPL-SCNC: 2.6 MMOL/L (ref 3.5–5.3)
POTASSIUM SERPL-SCNC: 4.2 MMOL/L (ref 3.5–5.3)
POTASSIUM SERPL-SCNC: 4.4 MMOL/L (ref 3.5–5.3)
POTASSIUM SERPL-SCNC: 4.5 MMOL/L (ref 3.5–5.3)
PROT SERPL-MCNC: 7.5 G/DL (ref 6.4–8.2)
PROT UR STRIP-MCNC: NEGATIVE MG/DL
PROTHROMBIN TIME: 13.5 SECONDS (ref 11.6–14.5)
RBC # BLD AUTO: 4.72 MILLION/UL (ref 3.88–5.62)
RBC #/AREA URNS AUTO: NORMAL /HPF
RBC MORPH BLD: NORMAL
RSV RNA RESP QL NAA+PROBE: NEGATIVE
S PNEUM AG UR QL: NEGATIVE
SARS-COV-2 RNA RESP QL NAA+PROBE: NEGATIVE
SMUDGE CELLS BLD QL SMEAR: PRESENT
SODIUM SERPL-SCNC: 126 MMOL/L (ref 136–145)
SODIUM SERPL-SCNC: 129 MMOL/L (ref 136–145)
SODIUM SERPL-SCNC: 133 MMOL/L (ref 136–145)
SODIUM SERPL-SCNC: 143 MMOL/L (ref 136–145)
SP GR UR STRIP.AUTO: 1.01 (ref 1–1.03)
UROBILINOGEN UR QL STRIP.AUTO: 0.2 E.U./DL
WBC # BLD AUTO: 21.41 THOUSAND/UL (ref 4.31–10.16)
WBC #/AREA URNS AUTO: NORMAL /HPF

## 2022-06-17 PROCEDURE — 85027 COMPLETE CBC AUTOMATED: CPT | Performed by: EMERGENCY MEDICINE

## 2022-06-17 PROCEDURE — 99232 SBSQ HOSP IP/OBS MODERATE 35: CPT | Performed by: INTERNAL MEDICINE

## 2022-06-17 PROCEDURE — 94760 N-INVAS EAR/PLS OXIMETRY 1: CPT

## 2022-06-17 PROCEDURE — 36415 COLL VENOUS BLD VENIPUNCTURE: CPT | Performed by: EMERGENCY MEDICINE

## 2022-06-17 PROCEDURE — 82948 REAGENT STRIP/BLOOD GLUCOSE: CPT

## 2022-06-17 PROCEDURE — 87449 NOS EACH ORGANISM AG IA: CPT | Performed by: PHYSICIAN ASSISTANT

## 2022-06-17 PROCEDURE — 84484 ASSAY OF TROPONIN QUANT: CPT | Performed by: EMERGENCY MEDICINE

## 2022-06-17 PROCEDURE — 85007 BL SMEAR W/DIFF WBC COUNT: CPT | Performed by: EMERGENCY MEDICINE

## 2022-06-17 PROCEDURE — 99285 EMERGENCY DEPT VISIT HI MDM: CPT | Performed by: EMERGENCY MEDICINE

## 2022-06-17 PROCEDURE — 94640 AIRWAY INHALATION TREATMENT: CPT

## 2022-06-17 PROCEDURE — 84100 ASSAY OF PHOSPHORUS: CPT | Performed by: PHYSICIAN ASSISTANT

## 2022-06-17 PROCEDURE — 80048 BASIC METABOLIC PNL TOTAL CA: CPT | Performed by: PHYSICIAN ASSISTANT

## 2022-06-17 PROCEDURE — 94669 MECHANICAL CHEST WALL OSCILL: CPT

## 2022-06-17 PROCEDURE — 71045 X-RAY EXAM CHEST 1 VIEW: CPT

## 2022-06-17 PROCEDURE — 96361 HYDRATE IV INFUSION ADD-ON: CPT

## 2022-06-17 PROCEDURE — 0241U HB NFCT DS VIR RESP RNA 4 TRGT: CPT | Performed by: EMERGENCY MEDICINE

## 2022-06-17 PROCEDURE — 87081 CULTURE SCREEN ONLY: CPT | Performed by: INTERNAL MEDICINE

## 2022-06-17 PROCEDURE — 83735 ASSAY OF MAGNESIUM: CPT | Performed by: PHYSICIAN ASSISTANT

## 2022-06-17 PROCEDURE — 87040 BLOOD CULTURE FOR BACTERIA: CPT | Performed by: EMERGENCY MEDICINE

## 2022-06-17 PROCEDURE — 83880 ASSAY OF NATRIURETIC PEPTIDE: CPT | Performed by: EMERGENCY MEDICINE

## 2022-06-17 PROCEDURE — 83735 ASSAY OF MAGNESIUM: CPT | Performed by: STUDENT IN AN ORGANIZED HEALTH CARE EDUCATION/TRAINING PROGRAM

## 2022-06-17 PROCEDURE — 80048 BASIC METABOLIC PNL TOTAL CA: CPT | Performed by: EMERGENCY MEDICINE

## 2022-06-17 PROCEDURE — 82010 KETONE BODYS QUAN: CPT | Performed by: EMERGENCY MEDICINE

## 2022-06-17 PROCEDURE — 82805 BLOOD GASES W/O2 SATURATION: CPT | Performed by: EMERGENCY MEDICINE

## 2022-06-17 PROCEDURE — 81001 URINALYSIS AUTO W/SCOPE: CPT | Performed by: EMERGENCY MEDICINE

## 2022-06-17 PROCEDURE — 85730 THROMBOPLASTIN TIME PARTIAL: CPT | Performed by: EMERGENCY MEDICINE

## 2022-06-17 PROCEDURE — 96365 THER/PROPH/DIAG IV INF INIT: CPT

## 2022-06-17 PROCEDURE — 83605 ASSAY OF LACTIC ACID: CPT | Performed by: EMERGENCY MEDICINE

## 2022-06-17 PROCEDURE — 94660 CPAP INITIATION&MGMT: CPT

## 2022-06-17 PROCEDURE — 80053 COMPREHEN METABOLIC PANEL: CPT | Performed by: EMERGENCY MEDICINE

## 2022-06-17 PROCEDURE — 85610 PROTHROMBIN TIME: CPT | Performed by: EMERGENCY MEDICINE

## 2022-06-17 PROCEDURE — 99285 EMERGENCY DEPT VISIT HI MDM: CPT

## 2022-06-17 RX ORDER — ALPRAZOLAM 0.5 MG/1
2 TABLET ORAL
Status: DISCONTINUED | OUTPATIENT
Start: 2022-06-17 | End: 2022-06-21 | Stop reason: HOSPADM

## 2022-06-17 RX ORDER — HYDROCODONE POLISTIREX AND CHLORPHENIRAMINE POLISTIREX 10; 8 MG/5ML; MG/5ML
5 SUSPENSION, EXTENDED RELEASE ORAL
Status: DISCONTINUED | OUTPATIENT
Start: 2022-06-17 | End: 2022-06-21 | Stop reason: HOSPADM

## 2022-06-17 RX ORDER — MAGNESIUM SULFATE HEPTAHYDRATE 40 MG/ML
2 INJECTION, SOLUTION INTRAVENOUS ONCE
Status: COMPLETED | OUTPATIENT
Start: 2022-06-17 | End: 2022-06-17

## 2022-06-17 RX ORDER — POTASSIUM CHLORIDE 20 MEQ/1
40 TABLET, EXTENDED RELEASE ORAL ONCE
Status: COMPLETED | OUTPATIENT
Start: 2022-06-17 | End: 2022-06-17

## 2022-06-17 RX ORDER — SODIUM CHLORIDE, SODIUM GLUCONATE, SODIUM ACETATE, POTASSIUM CHLORIDE, MAGNESIUM CHLORIDE, SODIUM PHOSPHATE, DIBASIC, AND POTASSIUM PHOSPHATE .53; .5; .37; .037; .03; .012; .00082 G/100ML; G/100ML; G/100ML; G/100ML; G/100ML; G/100ML; G/100ML
250 INJECTION, SOLUTION INTRAVENOUS CONTINUOUS
Status: DISCONTINUED | OUTPATIENT
Start: 2022-06-17 | End: 2022-06-17

## 2022-06-17 RX ORDER — SODIUM CHLORIDE, SODIUM GLUCONATE, SODIUM ACETATE, POTASSIUM CHLORIDE, MAGNESIUM CHLORIDE, SODIUM PHOSPHATE, DIBASIC, AND POTASSIUM PHOSPHATE .53; .5; .37; .037; .03; .012; .00082 G/100ML; G/100ML; G/100ML; G/100ML; G/100ML; G/100ML; G/100ML
500 INJECTION, SOLUTION INTRAVENOUS CONTINUOUS
Status: DISCONTINUED | OUTPATIENT
Start: 2022-06-17 | End: 2022-06-17

## 2022-06-17 RX ORDER — BUSPIRONE HYDROCHLORIDE 10 MG/1
10 TABLET ORAL 2 TIMES DAILY
Status: DISCONTINUED | OUTPATIENT
Start: 2022-06-17 | End: 2022-06-21 | Stop reason: HOSPADM

## 2022-06-17 RX ORDER — DIGOXIN 125 MCG
250 TABLET ORAL DAILY
Status: DISCONTINUED | OUTPATIENT
Start: 2022-06-17 | End: 2022-06-21 | Stop reason: HOSPADM

## 2022-06-17 RX ORDER — BACLOFEN 10 MG/1
10 TABLET ORAL EVERY 8 HOURS PRN
Status: DISCONTINUED | OUTPATIENT
Start: 2022-06-17 | End: 2022-06-21 | Stop reason: HOSPADM

## 2022-06-17 RX ORDER — DEXTROSE, SODIUM CHLORIDE, SODIUM LACTATE, POTASSIUM CHLORIDE, AND CALCIUM CHLORIDE 5; .6; .31; .03; .02 G/100ML; G/100ML; G/100ML; G/100ML; G/100ML
75 INJECTION, SOLUTION INTRAVENOUS CONTINUOUS
Status: DISCONTINUED | OUTPATIENT
Start: 2022-06-17 | End: 2022-06-18

## 2022-06-17 RX ORDER — DEXTROSE, SODIUM CHLORIDE, SODIUM LACTATE, POTASSIUM CHLORIDE, AND CALCIUM CHLORIDE 5; .6; .31; .03; .02 G/100ML; G/100ML; G/100ML; G/100ML; G/100ML
250 INJECTION, SOLUTION INTRAVENOUS CONTINUOUS
Status: DISCONTINUED | OUTPATIENT
Start: 2022-06-17 | End: 2022-06-17

## 2022-06-17 RX ORDER — IPRATROPIUM BROMIDE AND ALBUTEROL SULFATE 2.5; .5 MG/3ML; MG/3ML
SOLUTION RESPIRATORY (INHALATION)
Status: COMPLETED
Start: 2022-06-17 | End: 2022-06-17

## 2022-06-17 RX ORDER — CEFTRIAXONE 1 G/50ML
1000 INJECTION, SOLUTION INTRAVENOUS EVERY 24 HOURS
Status: DISCONTINUED | OUTPATIENT
Start: 2022-06-17 | End: 2022-06-21 | Stop reason: HOSPADM

## 2022-06-17 RX ORDER — NYSTATIN 100000 [USP'U]/G
POWDER TOPICAL 2 TIMES DAILY
Status: DISCONTINUED | OUTPATIENT
Start: 2022-06-17 | End: 2022-06-21 | Stop reason: HOSPADM

## 2022-06-17 RX ORDER — ACETAMINOPHEN 325 MG/1
650 TABLET ORAL EVERY 6 HOURS PRN
Status: DISCONTINUED | OUTPATIENT
Start: 2022-06-17 | End: 2022-06-21 | Stop reason: HOSPADM

## 2022-06-17 RX ORDER — IPRATROPIUM BROMIDE AND ALBUTEROL SULFATE 2.5; .5 MG/3ML; MG/3ML
3 SOLUTION RESPIRATORY (INHALATION)
Status: DISCONTINUED | OUTPATIENT
Start: 2022-06-17 | End: 2022-06-21 | Stop reason: HOSPADM

## 2022-06-17 RX ORDER — QUETIAPINE FUMARATE 100 MG/1
300 TABLET, FILM COATED ORAL
Status: DISCONTINUED | OUTPATIENT
Start: 2022-06-17 | End: 2022-06-21 | Stop reason: HOSPADM

## 2022-06-17 RX ORDER — LANOLIN ALCOHOL/MO/W.PET/CERES
3 CREAM (GRAM) TOPICAL
Status: DISCONTINUED | OUTPATIENT
Start: 2022-06-17 | End: 2022-06-21 | Stop reason: HOSPADM

## 2022-06-17 RX ORDER — IPRATROPIUM BROMIDE AND ALBUTEROL SULFATE 2.5; .5 MG/3ML; MG/3ML
3 SOLUTION RESPIRATORY (INHALATION)
Status: DISCONTINUED | OUTPATIENT
Start: 2022-06-17 | End: 2022-06-17

## 2022-06-17 RX ORDER — QUETIAPINE FUMARATE 50 MG/1
100 TABLET, EXTENDED RELEASE ORAL EVERY MORNING
Status: DISCONTINUED | OUTPATIENT
Start: 2022-06-17 | End: 2022-06-21 | Stop reason: HOSPADM

## 2022-06-17 RX ORDER — ASPIRIN 81 MG/1
81 TABLET, CHEWABLE ORAL DAILY
Status: DISCONTINUED | OUTPATIENT
Start: 2022-06-17 | End: 2022-06-21 | Stop reason: HOSPADM

## 2022-06-17 RX ORDER — TIZANIDINE 2 MG/1
4 TABLET ORAL EVERY 8 HOURS PRN
Status: DISCONTINUED | OUTPATIENT
Start: 2022-06-17 | End: 2022-06-17

## 2022-06-17 RX ORDER — GUAIFENESIN 600 MG
600 TABLET, EXTENDED RELEASE 12 HR ORAL EVERY 12 HOURS SCHEDULED
Status: DISCONTINUED | OUTPATIENT
Start: 2022-06-17 | End: 2022-06-21 | Stop reason: HOSPADM

## 2022-06-17 RX ORDER — POTASSIUM CHLORIDE 14.9 MG/ML
20 INJECTION INTRAVENOUS ONCE
Status: DISCONTINUED | OUTPATIENT
Start: 2022-06-17 | End: 2022-06-17

## 2022-06-17 RX ORDER — SODIUM CHLORIDE 9 MG/ML
125 INJECTION, SOLUTION INTRAVENOUS CONTINUOUS
Status: DISCONTINUED | OUTPATIENT
Start: 2022-06-17 | End: 2022-06-17

## 2022-06-17 RX ORDER — ALBUTEROL SULFATE 2.5 MG/3ML
2.5 SOLUTION RESPIRATORY (INHALATION) EVERY 6 HOURS PRN
Status: DISCONTINUED | OUTPATIENT
Start: 2022-06-17 | End: 2022-06-21 | Stop reason: HOSPADM

## 2022-06-17 RX ORDER — PANTOPRAZOLE SODIUM 40 MG/1
40 TABLET, DELAYED RELEASE ORAL
Status: DISCONTINUED | OUTPATIENT
Start: 2022-06-18 | End: 2022-06-21 | Stop reason: HOSPADM

## 2022-06-17 RX ORDER — ATORVASTATIN CALCIUM 40 MG/1
80 TABLET, FILM COATED ORAL
Status: DISCONTINUED | OUTPATIENT
Start: 2022-06-17 | End: 2022-06-21 | Stop reason: HOSPADM

## 2022-06-17 RX ORDER — METOPROLOL SUCCINATE 100 MG/1
200 TABLET, EXTENDED RELEASE ORAL DAILY
Status: DISCONTINUED | OUTPATIENT
Start: 2022-06-18 | End: 2022-06-21 | Stop reason: HOSPADM

## 2022-06-17 RX ORDER — LOSARTAN POTASSIUM 50 MG/1
50 TABLET ORAL DAILY
Status: DISCONTINUED | OUTPATIENT
Start: 2022-06-18 | End: 2022-06-21 | Stop reason: HOSPADM

## 2022-06-17 RX ORDER — MELATONIN
1000 DAILY
Status: DISCONTINUED | OUTPATIENT
Start: 2022-06-17 | End: 2022-06-21 | Stop reason: HOSPADM

## 2022-06-17 RX ORDER — GABAPENTIN 300 MG/1
300 CAPSULE ORAL 3 TIMES DAILY
Status: DISCONTINUED | OUTPATIENT
Start: 2022-06-17 | End: 2022-06-21 | Stop reason: HOSPADM

## 2022-06-17 RX ADMIN — IPRATROPIUM BROMIDE AND ALBUTEROL SULFATE 3 ML: 2.5; .5 SOLUTION RESPIRATORY (INHALATION) at 13:40

## 2022-06-17 RX ADMIN — Medication 1000 UNITS: at 13:48

## 2022-06-17 RX ADMIN — SODIUM CHLORIDE 1000 ML: 0.9 INJECTION, SOLUTION INTRAVENOUS at 07:56

## 2022-06-17 RX ADMIN — IPRATROPIUM BROMIDE AND ALBUTEROL SULFATE 3 ML: 2.5; .5 SOLUTION RESPIRATORY (INHALATION) at 19:29

## 2022-06-17 RX ADMIN — APIXABAN 5 MG: 5 TABLET, FILM COATED ORAL at 17:56

## 2022-06-17 RX ADMIN — DEXTROSE, SODIUM CHLORIDE, SODIUM LACTATE, POTASSIUM CHLORIDE, AND CALCIUM CHLORIDE 250 ML/HR: 5; .6; .31; .03; .02 INJECTION, SOLUTION INTRAVENOUS at 18:21

## 2022-06-17 RX ADMIN — PIPERACILLIN AND TAZOBACTAM 3.38 G: 3; .375 INJECTION, POWDER, LYOPHILIZED, FOR SOLUTION INTRAVENOUS at 09:06

## 2022-06-17 RX ADMIN — ATORVASTATIN CALCIUM 80 MG: 40 TABLET, FILM COATED ORAL at 15:37

## 2022-06-17 RX ADMIN — QUETIAPINE FUMARATE 300 MG: 100 TABLET ORAL at 21:39

## 2022-06-17 RX ADMIN — POTASSIUM CHLORIDE 40 MEQ: 1500 TABLET, EXTENDED RELEASE ORAL at 14:54

## 2022-06-17 RX ADMIN — GABAPENTIN 300 MG: 300 CAPSULE ORAL at 15:37

## 2022-06-17 RX ADMIN — HYDROCODONE POLISTIREX AND CHLORPHENIRAMINE POLISTIREX 5 ML: 10; 8 SUSPENSION, EXTENDED RELEASE ORAL at 21:38

## 2022-06-17 RX ADMIN — MAGNESIUM SULFATE HEPTAHYDRATE 2 G: 40 INJECTION, SOLUTION INTRAVENOUS at 14:33

## 2022-06-17 RX ADMIN — SODIUM CHLORIDE 5 UNITS/HR: 9 INJECTION, SOLUTION INTRAVENOUS at 19:33

## 2022-06-17 RX ADMIN — ALPRAZOLAM 2 MG: 0.5 TABLET ORAL at 22:15

## 2022-06-17 RX ADMIN — QUETIAPINE FUMARATE 100 MG: 50 TABLET, EXTENDED RELEASE ORAL at 15:29

## 2022-06-17 RX ADMIN — IPRATROPIUM BROMIDE AND ALBUTEROL SULFATE 3 ML: 2.5; .5 SOLUTION RESPIRATORY (INHALATION) at 23:58

## 2022-06-17 RX ADMIN — DIGOXIN 250 MCG: 125 TABLET ORAL at 13:48

## 2022-06-17 RX ADMIN — AZITHROMYCIN MONOHYDRATE 500 MG: 500 INJECTION, POWDER, LYOPHILIZED, FOR SOLUTION INTRAVENOUS at 12:47

## 2022-06-17 RX ADMIN — SODIUM CHLORIDE 125 ML/HR: 0.9 INJECTION, SOLUTION INTRAVENOUS at 09:58

## 2022-06-17 RX ADMIN — CEFTRIAXONE 1000 MG: 1 INJECTION, SOLUTION INTRAVENOUS at 15:37

## 2022-06-17 RX ADMIN — SODIUM CHLORIDE 10 UNITS/HR: 9 INJECTION, SOLUTION INTRAVENOUS at 12:35

## 2022-06-17 RX ADMIN — BUSPIRONE HYDROCHLORIDE 10 MG: 10 TABLET ORAL at 17:56

## 2022-06-17 RX ADMIN — GABAPENTIN 300 MG: 300 CAPSULE ORAL at 21:38

## 2022-06-17 RX ADMIN — GUAIFENESIN 600 MG: 600 TABLET, EXTENDED RELEASE ORAL at 21:39

## 2022-06-17 RX ADMIN — ACETAMINOPHEN 650 MG: 325 TABLET ORAL at 14:54

## 2022-06-17 RX ADMIN — ASPIRIN 81 MG CHEWABLE TABLET 81 MG: 81 TABLET CHEWABLE at 13:47

## 2022-06-17 RX ADMIN — SODIUM CHLORIDE, SODIUM GLUCONATE, SODIUM ACETATE, POTASSIUM CHLORIDE, MAGNESIUM CHLORIDE, SODIUM PHOSPHATE, DIBASIC, AND POTASSIUM PHOSPHATE 250 ML/HR: .53; .5; .37; .037; .03; .012; .00082 INJECTION, SOLUTION INTRAVENOUS at 15:27

## 2022-06-17 RX ADMIN — SODIUM CHLORIDE, SODIUM GLUCONATE, SODIUM ACETATE, POTASSIUM CHLORIDE, MAGNESIUM CHLORIDE, SODIUM PHOSPHATE, DIBASIC, AND POTASSIUM PHOSPHATE 500 ML/HR: .53; .5; .37; .037; .03; .012; .00082 INJECTION, SOLUTION INTRAVENOUS at 13:13

## 2022-06-17 RX ADMIN — SERTRALINE HYDROCHLORIDE 50 MG: 50 TABLET ORAL at 13:50

## 2022-06-17 RX ADMIN — POTASSIUM PHOSPHATE, MONOBASIC AND POTASSIUM PHOSPHATE, DIBASIC 30 MMOL: 224; 236 INJECTION, SOLUTION, CONCENTRATE INTRAVENOUS at 15:57

## 2022-06-17 NOTE — ASSESSMENT & PLAN NOTE
Patient presented with shortness of breath and found to have right lower lobe infiltrate/atelectasis on CXR  Patient currently on home oxygen at 4 L  Patient was recently admitted in 0 4/27 and is still in 60 day window however he still low criteria for Hcap    No history of MRSA    · Patient received 3 375 mg of Zosyn  · Started ceftriaxone and azithromycin  · Continue to monitor oxygen needs

## 2022-06-17 NOTE — ASSESSMENT & PLAN NOTE
Patient currently on sertraline 50 mg, Seroquel 400 mg, BuSpar 10 mg b i d      Continue current medications

## 2022-06-17 NOTE — ASSESSMENT & PLAN NOTE
Lab Results   Component Value Date    HGBA1C 8 6 (H) 03/25/2022       Recent Labs     06/17/22  0732   POCGLU >500*       Blood Sugar Average: Last 72 hrs:     Continue to monitor glucose  See plan above

## 2022-06-17 NOTE — ASSESSMENT & PLAN NOTE
Patient with leukocytosis with WBC of 21 41 in the setting of right lower lobe pneumonia    · Continue ceftriaxone and azithromycin  · Will continue to monitor CBC

## 2022-06-17 NOTE — QUICK NOTE
Spoke to patient's friend Aye Ferrer per his request  Updated her on the patient and all questions were answered

## 2022-06-17 NOTE — ASSESSMENT & PLAN NOTE
Patient with a history of COPD with chronic respiratory failure currently using 2 L of oxygen at home  On admission day, patient states that he had been using 4 L of oxygen in the past few days due to shortness of breath      - continue titrating oxygen as needed by patient

## 2022-06-17 NOTE — ASSESSMENT & PLAN NOTE
Patient with a history of heart failure with preserved EF    Last echo in 03/2022 with EF of 55%  Home medications include metoprolol 200 mg daily and digoxin 0 25 mg daily    · Continue home medications with holding parameters if BP is low  · Daily weights  · Strict I's and O's     Wt Readings from Last 3 Encounters:   05/23/22 126 kg (277 lb)   05/20/22 126 kg (277 lb)   04/28/22 124 kg (273 lb 5 9 oz)

## 2022-06-17 NOTE — ASSESSMENT & PLAN NOTE
· Stable  · HR on admission   · EKG pending    · Patient currently on digoxin, metoprolol and Eliquis   · Telemetry

## 2022-06-17 NOTE — ED PROVIDER NOTES
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  Interpretation:     Interpretation: abnormal    Rate:     ECG rate:  113    ECG rate assessment: tachycardic    Rhythm:     Rhythm: atrial fibrillation    Ectopy:     Ectopy: none    QRS:     QRS axis:  Normal    QRS intervals:  Normal  Conduction:     Conduction: normal    ST segments:     ST segments:  Normal  T waves:     T waves: inverted      Inverted:  AVF             ED Course                                             MDM  Number of Diagnoses or Management Options  Diagnosis management comments: Right lower lobe pneumonia causing uncontrolled diabetes and sepsis with lactic acidosis      Disposition  Final diagnoses:   Pneumonia   DKA (diabetic ketoacidosis) ([facility] )   Sepsis ([facility] )     Time reflects when diagnosis was documented in both MDM as applicable and the Disposition within this note     Time User Action Codes Description Comment    6/17/2022 11:56 AM Curly Chavarria Add [E11 10] Diabetic ketoacidosis without coma associated with type 2 diabetes mellitus ([facility] )     6/17/2022 12:03 PM Faisal Montes [J18 9] Pneumonia     6/17/2022 12:03 PM Faisal COSBY Add [E11 10] DKA (diabetic ketoacidosis) ([facility] ) 6/17/2022 12:03 PM Archana COSBY Add [A41 9] Sepsis [facility])       ED Disposition     ED Disposition   Admit    Condition   Stable    Date/Time   Fri Jun 17, 2022 11:56 AM    Comment   Case was discussed with critical care and the patient's admission status was agreed to be Admission Status: inpatient status to the service of Dr Kala Gonzalez              Follow-up Information    None         Current Discharge Medication List      CONTINUE these medications which have NOT CHANGED    Details   albuterol (2 5 mg/3 mL) 0 083 % nebulizer solution Take 1 vial (2 5 mg total) by nebulization every 6 (six) hours as needed for wheezing  Qty: 360 mL, Refills: 5    Associated Diagnoses: Chronic obstructive pulmonary disease, unspecified COPD type ([facility])      ALPRAZolam (XANAX) 2 MG tablet Take 1 tablet (2 mg total) by mouth daily at bedtime  Qty: 5 tablet, Refills: 0    Associated Diagnoses: Psychiatric disorder      Ascorbic Acid (VITAMIN C) 1000 MG tablet Take 1,000 mg by mouth daily      aspirin 81 MG tablet Take 81 mg by mouth every morning        atorvastatin (LIPITOR) 80 mg tablet TAKE ONE TABLET BY MOUTH DAILY  Qty: 90 tablet, Refills: 3    Associated Diagnoses: Hyperlipidemia, unspecified hyperlipidemia type      baclofen 10 mg tablet Take 1 tablet (10 mg total) by mouth every 8 (eight) hours as needed for muscle spasms  Qty: 30 tablet, Refills: 0    Associated Diagnoses: Chronic back pain      busPIRone (BUSPAR) 10 mg tablet Take 10 mg by mouth 2 (two) times a day       cholecalciferol (VITAMIN D3) 1,000 units tablet Take 1 tablet (1,000 Units total) by mouth daily  Qty: 90 tablet, Refills: 3    Associated Diagnoses: H/O vitamin D deficiency      digoxin (LANOXIN) 0 25 mg tablet TAKE ONE TABLET BY MOUTH DAILY  Qty: 30 tablet, Refills: 5    Associated Diagnoses: Atrial fibrillation with RVR (HCC)      Eliquis 5 MG TAKE ONE TABLET BY MOUTH TWICE DAILY  Qty: 180 tablet, Refills: 3    Associated Diagnoses: Atrial fibrillation with RVR ([facility])      gabapentin (Neurontin) 300 mg capsule Take 1 capsule (300 mg total) by mouth in the morning and 1 capsule (300 mg total) in the evening and 1 capsule (300 mg total) before bedtime  Qty: 90 capsule, Refills: 0    Associated Diagnoses: Chronic pain syndrome; Chronic left-sided low back pain with left-sided sciatica; Foraminal stenosis of lumbar region; Lumbar post-laminectomy syndrome; Lumbar radiculopathy      losartan (COZAAR) 50 mg tablet TAKE ONE TABLET BY MOUTH DAILY  Qty: 90 tablet, Refills: 3    Associated Diagnoses: Chronic systolic heart failure ([facility])      metFORMIN (GLUCOPHAGE-XR) 500 mg 24 hr tablet TAKE ONE TABLET BY MOUTH DAILY  Qty: 90 tablet, Refills: 3    Associated Diagnoses: Type 2 diabetes mellitus with hyperglycemia, with long-term current use of insulin ([facility])      metoprolol succinate (TOPROL-XL) 200 MG 24 hr tablet TAKE ONE TABLET BY MOUTH DAILY  Qty: 90 tablet, Refills: 1    Associated Diagnoses: Coronary artery disease involving native heart without angina pectoris, unspecified vessel or lesion type      Multiple Vitamins-Minerals (CENTRUM SILVER 50+MEN PO) Take by mouth      potassium chloride (K-DUR,KLOR-CON) 20 mEq tablet TAKE ONE TABLET BY MOUTH DAILY  Qty: 90 tablet, Refills: 0    Associated Diagnoses: Electrolyte abnormality      QUEtiapine (SEROquel XR) 50 mg Take 100 mg by mouth every morning      sertraline (ZOLOFT) 50 mg tablet Take 50 mg by mouth in the morning  Daily at bedtime         tiZANidine (ZANAFLEX) 4 mg tablet Take 1 tablet (4 mg total) by mouth every 8 (eight) hours as needed for muscle spasms  Refills: 0    Associated Diagnoses: Lumbar radiculopathy      Victoza injection INJECT 0 3 ML (1 8 MG TOTAL) UNDER THE SKIN DAILY  Qty: 9 mL, Refills: 1    Associated Diagnoses: Type 2 diabetes mellitus with complication, with long-term current use of insulin ([facility])      acetaminophen (TYLENOL) 325 mg tablet Take 2 tablets (650 mg total) by mouth every 6 (six) hours as needed for mild pain, headaches or fever  Qty: 30 tablet, Refills: 0    Comments: Available over the counter  Associated Diagnoses: Community acquired pneumonia      Alcohol Swabs (B-D SINGLE USE SWABS REGULAR) PADS USE FIVE TIMES A DAY AS DIRECTED  Qty: 500 each, Refills: 1    Associated Diagnoses: Type 2 diabetes mellitus with hyperglycemia, with long-term current use of insulin ([address] )      ! ! B-D ULTRAFINE III SHORT PEN 31G X 8 MM MISC Use as directed      !! [place] Choice Comfort EZ 33G X 4 MM MISC USE TO INJECT INSULIN 5 TIMES A DAY      Continuous Blood Gluc  (FreeStyle [place] 14 Day [place]) BHARAT Use      Continuous Blood Gluc Sensor (FreeStyle [place] 14 Day Sensor) MISC Use 1 application every 14 (fourteen) days  Qty: 6 each, Refills: 1    Associated Diagnoses: Type 2 diabetes mellitus with hyperglycemia, with long-term current use of insulin (HCC)      diclofenac sodium (Voltaren) 1 % Apply 2 g topically 2 (two) times a day as needed (For pain)  Qty: 100 g, Refills: 0    Associated Diagnoses: Muscle spasm      fluticasone-umeclidinium-vilanterol (TRELEGY) 100-62 5-25 MCG/INH inhaler Inhale 1 puff daily Rinse mouth after use  Qty: 1 each, Refills: 11    Associated Diagnoses: Centrilobular emphysema (HCC)      insulin glargine (Basaglar KwikPen) 100 units/mL injection pen 75 units SC BID  Qty: 105 mL, Refills: 3    Associated Diagnoses: Type 2 diabetes mellitus with hyperglycemia, with long-term current use of insulin ([address] )      ! !  Insulin Pen Needle ([place] Choice Comfort EZ) 33G X 4 MM MISC USE TO INJECT INSULIN 5 TIMES A DAY  Qty: 500 each, Refills: 1    Associated Diagnoses: Type 2 diabetes mellitus with complication, with long-term current use of insulin (HCC)      Lancet Devices (Adjustable Lancing Device) MISC USE AS DIRECTED  Qty: 1 each, Refills: 0    Associated Diagnoses: Type 2 diabetes mellitus with complication, with long-term current use of insulin (HCC)      Lancets (onetouch ultrasoft) lancets test blood sugar 3 (three) times a day  Qty: 300 each, Refills: 3    Associated Diagnoses: Type 2 diabetes mellitus with complication, with long-term current use of insulin ([facility])      NovoLOG FlexPen 100 units/mL injection pen INJECT 35 UNITS UNDER THE SKIN THREE TIMES A DAY WITH MEALS AND PER SLIDING SCALE  Qty: 30 mL, Refills: 3    Associated Diagnoses: Uncontrolled type 2 diabetes mellitus with hyperglycemia ([facility])      Omega-3 Fatty Acids (fish oil) 1,000 mg Take 2 capsules (2,000 mg total) by mouth daily  Refills: 0    Associated Diagnoses: Dyslipidemia      omeprazole (PriLOSEC) 20 mg delayed release capsule Take 1 capsule (20 mg total) by mouth daily  Qty: 90 capsule, Refills: 3    Associated Diagnoses: Gastroesophageal reflux disease      QUEtiapine (SEROquel) 300 mg tablet Take 300 mg by mouth daily at bedtime      !! Unifine SafeControl Pen Needle 30G X 5 MM MISC       Vascepa 1 g CAPS TAKE 2 CAPSULES BY MOUTH TWICE DAILY  Qty: 360 capsule, Refills: 3    Associated Diagnoses: Dyslipidemia       !! - Potential duplicate medications found  Please discuss with provider  No discharge procedures on file      PDMP Review       Value Time User    PDMP Reviewed  Yes 4/17/2022  5:37  [facility],           ED Provider  Electronically Signed by           Rodri Noel MD  06/17/22 [id number]

## 2022-06-17 NOTE — ASSESSMENT & PLAN NOTE
BP on admission 150s to 160s over 70s to 80s     -continue home medication losartan 50 mg daily and metoprolol 200 mg daily  - Continue to monitor BP
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
## 2022-06-18 LAB
ALBUMIN SERPL BCP-MCNC: 2.9 G/DL (ref 3.5–5)
ALP SERPL-CCNC: 51 U/L (ref 46–116)
ALT SERPL W P-5'-P-CCNC: 19 U/L (ref 12–78)
ANION GAP SERPL CALCULATED.3IONS-SCNC: 5 MMOL/L (ref 4–13)
ANION GAP SERPL CALCULATED.3IONS-SCNC: 8 MMOL/L (ref 4–13)
AST SERPL W P-5'-P-CCNC: 15 U/L (ref 5–45)
BASOPHILS # BLD AUTO: 0.03 THOUSANDS/ΜL (ref 0–0.1)
BASOPHILS NFR BLD AUTO: 0 % (ref 0–1)
BILIRUB SERPL-MCNC: 0.68 MG/DL (ref 0.2–1)
BUN SERPL-MCNC: 17 MG/DL (ref 5–25)
BUN SERPL-MCNC: 18 MG/DL (ref 5–25)
CALCIUM ALBUM COR SERPL-MCNC: 9.3 MG/DL (ref 8.3–10.1)
CALCIUM SERPL-MCNC: 8.4 MG/DL (ref 8.3–10.1)
CALCIUM SERPL-MCNC: 8.5 MG/DL (ref 8.3–10.1)
CHLORIDE SERPL-SCNC: 102 MMOL/L (ref 100–108)
CHLORIDE SERPL-SCNC: 99 MMOL/L (ref 100–108)
CO2 SERPL-SCNC: 30 MMOL/L (ref 21–32)
CO2 SERPL-SCNC: 30 MMOL/L (ref 21–32)
CREAT SERPL-MCNC: 0.84 MG/DL (ref 0.6–1.3)
CREAT SERPL-MCNC: 0.85 MG/DL (ref 0.6–1.3)
EOSINOPHIL # BLD AUTO: 0.1 THOUSAND/ΜL (ref 0–0.61)
EOSINOPHIL NFR BLD AUTO: 1 % (ref 0–6)
ERYTHROCYTE [DISTWIDTH] IN BLOOD BY AUTOMATED COUNT: 13.5 % (ref 11.6–15.1)
GFR SERPL CREATININE-BSD FRML MDRD: 93 ML/MIN/1.73SQ M
GFR SERPL CREATININE-BSD FRML MDRD: 93 ML/MIN/1.73SQ M
GLUCOSE SERPL-MCNC: 143 MG/DL (ref 65–140)
GLUCOSE SERPL-MCNC: 152 MG/DL (ref 65–140)
GLUCOSE SERPL-MCNC: 155 MG/DL (ref 65–140)
GLUCOSE SERPL-MCNC: 160 MG/DL (ref 65–140)
GLUCOSE SERPL-MCNC: 163 MG/DL (ref 65–140)
GLUCOSE SERPL-MCNC: 180 MG/DL (ref 65–140)
GLUCOSE SERPL-MCNC: 181 MG/DL (ref 65–140)
GLUCOSE SERPL-MCNC: 185 MG/DL (ref 65–140)
GLUCOSE SERPL-MCNC: 185 MG/DL (ref 65–140)
GLUCOSE SERPL-MCNC: 203 MG/DL (ref 65–140)
GLUCOSE SERPL-MCNC: 268 MG/DL (ref 65–140)
GLUCOSE SERPL-MCNC: 270 MG/DL (ref 65–140)
HCT VFR BLD AUTO: 35.9 % (ref 36.5–49.3)
HGB BLD-MCNC: 11.9 G/DL (ref 12–17)
IMM GRANULOCYTES # BLD AUTO: 0.08 THOUSAND/UL (ref 0–0.2)
IMM GRANULOCYTES NFR BLD AUTO: 1 % (ref 0–2)
LYMPHOCYTES # BLD AUTO: 4.37 THOUSANDS/ΜL (ref 0.6–4.47)
LYMPHOCYTES NFR BLD AUTO: 31 % (ref 14–44)
MAGNESIUM SERPL-MCNC: 2.4 MG/DL (ref 1.6–2.6)
MAGNESIUM SERPL-MCNC: 2.5 MG/DL (ref 1.6–2.6)
MCH RBC QN AUTO: 29.8 PG (ref 26.8–34.3)
MCHC RBC AUTO-ENTMCNC: 33.1 G/DL (ref 31.4–37.4)
MCV RBC AUTO: 90 FL (ref 82–98)
MONOCYTES # BLD AUTO: 0.76 THOUSAND/ΜL (ref 0.17–1.22)
MONOCYTES NFR BLD AUTO: 5 % (ref 4–12)
NEUTROPHILS # BLD AUTO: 8.69 THOUSANDS/ΜL (ref 1.85–7.62)
NEUTS SEG NFR BLD AUTO: 62 % (ref 43–75)
NRBC BLD AUTO-RTO: 0 /100 WBCS
PHOSPHATE SERPL-MCNC: 1.5 MG/DL (ref 2.3–4.1)
PHOSPHATE SERPL-MCNC: 1.7 MG/DL (ref 2.3–4.1)
PLATELET # BLD AUTO: 184 THOUSANDS/UL (ref 149–390)
PMV BLD AUTO: 9.2 FL (ref 8.9–12.7)
POTASSIUM SERPL-SCNC: 3.6 MMOL/L (ref 3.5–5.3)
POTASSIUM SERPL-SCNC: 4 MMOL/L (ref 3.5–5.3)
PROCALCITONIN SERPL-MCNC: 0.64 NG/ML
PROT SERPL-MCNC: 6.3 G/DL (ref 6.4–8.2)
RBC # BLD AUTO: 3.99 MILLION/UL (ref 3.88–5.62)
SODIUM SERPL-SCNC: 137 MMOL/L (ref 136–145)
SODIUM SERPL-SCNC: 137 MMOL/L (ref 136–145)
WBC # BLD AUTO: 14.03 THOUSAND/UL (ref 4.31–10.16)

## 2022-06-18 PROCEDURE — 94669 MECHANICAL CHEST WALL OSCILL: CPT

## 2022-06-18 PROCEDURE — 84100 ASSAY OF PHOSPHORUS: CPT | Performed by: NURSE PRACTITIONER

## 2022-06-18 PROCEDURE — 99232 SBSQ HOSP IP/OBS MODERATE 35: CPT | Performed by: INTERNAL MEDICINE

## 2022-06-18 PROCEDURE — 94760 N-INVAS EAR/PLS OXIMETRY 1: CPT

## 2022-06-18 PROCEDURE — 83735 ASSAY OF MAGNESIUM: CPT | Performed by: NURSE PRACTITIONER

## 2022-06-18 PROCEDURE — 84145 PROCALCITONIN (PCT): CPT | Performed by: NURSE PRACTITIONER

## 2022-06-18 PROCEDURE — 87493 C DIFF AMPLIFIED PROBE: CPT | Performed by: NURSE PRACTITIONER

## 2022-06-18 PROCEDURE — 94640 AIRWAY INHALATION TREATMENT: CPT

## 2022-06-18 PROCEDURE — 85025 COMPLETE CBC W/AUTO DIFF WBC: CPT | Performed by: NURSE PRACTITIONER

## 2022-06-18 PROCEDURE — 94668 MNPJ CHEST WALL SBSQ: CPT

## 2022-06-18 PROCEDURE — 94660 CPAP INITIATION&MGMT: CPT

## 2022-06-18 PROCEDURE — 82948 REAGENT STRIP/BLOOD GLUCOSE: CPT

## 2022-06-18 PROCEDURE — 80053 COMPREHEN METABOLIC PANEL: CPT | Performed by: NURSE PRACTITIONER

## 2022-06-18 RX ORDER — INSULIN LISPRO 100 [IU]/ML
2-12 INJECTION, SOLUTION INTRAVENOUS; SUBCUTANEOUS
Status: DISCONTINUED | OUTPATIENT
Start: 2022-06-18 | End: 2022-06-20

## 2022-06-18 RX ORDER — POTASSIUM CHLORIDE 14.9 MG/ML
20 INJECTION INTRAVENOUS ONCE
Status: COMPLETED | OUTPATIENT
Start: 2022-06-18 | End: 2022-06-18

## 2022-06-18 RX ORDER — INSULIN LISPRO 100 [IU]/ML
20 INJECTION, SOLUTION INTRAVENOUS; SUBCUTANEOUS
Status: DISCONTINUED | OUTPATIENT
Start: 2022-06-18 | End: 2022-06-18

## 2022-06-18 RX ORDER — FLUTICASONE FUROATE AND VILANTEROL 100; 25 UG/1; UG/1
1 POWDER RESPIRATORY (INHALATION) DAILY
Status: DISCONTINUED | OUTPATIENT
Start: 2022-06-18 | End: 2022-06-21 | Stop reason: HOSPADM

## 2022-06-18 RX ORDER — INSULIN LISPRO 100 [IU]/ML
25 INJECTION, SOLUTION INTRAVENOUS; SUBCUTANEOUS
Status: DISCONTINUED | OUTPATIENT
Start: 2022-06-18 | End: 2022-06-19

## 2022-06-18 RX ORDER — INSULIN LISPRO 100 [IU]/ML
2-12 INJECTION, SOLUTION INTRAVENOUS; SUBCUTANEOUS
Status: DISCONTINUED | OUTPATIENT
Start: 2022-06-18 | End: 2022-06-18

## 2022-06-18 RX ORDER — INSULIN LISPRO 100 [IU]/ML
2-12 INJECTION, SOLUTION INTRAVENOUS; SUBCUTANEOUS
Status: DISCONTINUED | OUTPATIENT
Start: 2022-06-19 | End: 2022-06-20

## 2022-06-18 RX ORDER — INSULIN GLARGINE 100 [IU]/ML
60 INJECTION, SOLUTION SUBCUTANEOUS EVERY 12 HOURS SCHEDULED
Status: DISCONTINUED | OUTPATIENT
Start: 2022-06-18 | End: 2022-06-19

## 2022-06-18 RX ADMIN — DEXTROSE, SODIUM CHLORIDE, SODIUM LACTATE, POTASSIUM CHLORIDE, AND CALCIUM CHLORIDE 125 ML/HR: 5; .6; .31; .03; .02 INJECTION, SOLUTION INTRAVENOUS at 00:00

## 2022-06-18 RX ADMIN — GUAIFENESIN 600 MG: 600 TABLET, EXTENDED RELEASE ORAL at 08:22

## 2022-06-18 RX ADMIN — METOPROLOL SUCCINATE 200 MG: 100 TABLET, EXTENDED RELEASE ORAL at 08:22

## 2022-06-18 RX ADMIN — INSULIN LISPRO 25 UNITS: 100 INJECTION, SOLUTION INTRAVENOUS; SUBCUTANEOUS at 17:18

## 2022-06-18 RX ADMIN — QUETIAPINE FUMARATE 100 MG: 50 TABLET, EXTENDED RELEASE ORAL at 08:25

## 2022-06-18 RX ADMIN — Medication 1000 UNITS: at 08:22

## 2022-06-18 RX ADMIN — GABAPENTIN 300 MG: 300 CAPSULE ORAL at 17:18

## 2022-06-18 RX ADMIN — POTASSIUM CHLORIDE 20 MEQ: 14.9 INJECTION, SOLUTION INTRAVENOUS at 01:30

## 2022-06-18 RX ADMIN — POTASSIUM CHLORIDE 20 MEQ: 200 INJECTION, SOLUTION INTRAVENOUS at 03:18

## 2022-06-18 RX ADMIN — GUAIFENESIN 600 MG: 600 TABLET, EXTENDED RELEASE ORAL at 21:39

## 2022-06-18 RX ADMIN — ACETAMINOPHEN 650 MG: 325 TABLET ORAL at 21:48

## 2022-06-18 RX ADMIN — IPRATROPIUM BROMIDE AND ALBUTEROL SULFATE 3 ML: 2.5; .5 SOLUTION RESPIRATORY (INHALATION) at 19:13

## 2022-06-18 RX ADMIN — INSULIN GLARGINE 60 UNITS: 100 INJECTION, SOLUTION SUBCUTANEOUS at 21:36

## 2022-06-18 RX ADMIN — PHENOL 1 SPRAY: 1.4 SPRAY ORAL at 08:38

## 2022-06-18 RX ADMIN — INSULIN LISPRO 6 UNITS: 100 INJECTION, SOLUTION INTRAVENOUS; SUBCUTANEOUS at 17:18

## 2022-06-18 RX ADMIN — BUSPIRONE HYDROCHLORIDE 10 MG: 10 TABLET ORAL at 08:22

## 2022-06-18 RX ADMIN — DEXTROSE, SODIUM CHLORIDE, SODIUM LACTATE, POTASSIUM CHLORIDE, AND CALCIUM CHLORIDE 75 ML/HR: 5; .6; .31; .03; .02 INJECTION, SOLUTION INTRAVENOUS at 08:35

## 2022-06-18 RX ADMIN — SODIUM CHLORIDE 9 UNITS/HR: 9 INJECTION, SOLUTION INTRAVENOUS at 08:35

## 2022-06-18 RX ADMIN — ASPIRIN 81 MG CHEWABLE TABLET 81 MG: 81 TABLET CHEWABLE at 08:22

## 2022-06-18 RX ADMIN — INSULIN LISPRO 6 UNITS: 100 INJECTION, SOLUTION INTRAVENOUS; SUBCUTANEOUS at 21:34

## 2022-06-18 RX ADMIN — IPRATROPIUM BROMIDE AND ALBUTEROL SULFATE 3 ML: 2.5; .5 SOLUTION RESPIRATORY (INHALATION) at 11:00

## 2022-06-18 RX ADMIN — HYDROCODONE POLISTIREX AND CHLORPHENIRAMINE POLISTIREX 5 ML: 10; 8 SUSPENSION, EXTENDED RELEASE ORAL at 21:39

## 2022-06-18 RX ADMIN — FLUTICASONE FUROATE AND VILANTEROL TRIFENATATE 1 PUFF: 100; 25 POWDER RESPIRATORY (INHALATION) at 11:47

## 2022-06-18 RX ADMIN — IPRATROPIUM BROMIDE AND ALBUTEROL SULFATE 3 ML: 2.5; .5 SOLUTION RESPIRATORY (INHALATION) at 15:35

## 2022-06-18 RX ADMIN — CEFTRIAXONE 1000 MG: 1 INJECTION, SOLUTION INTRAVENOUS at 17:18

## 2022-06-18 RX ADMIN — GABAPENTIN 300 MG: 300 CAPSULE ORAL at 21:39

## 2022-06-18 RX ADMIN — QUETIAPINE FUMARATE 300 MG: 100 TABLET ORAL at 21:40

## 2022-06-18 RX ADMIN — ATORVASTATIN CALCIUM 80 MG: 40 TABLET, FILM COATED ORAL at 17:18

## 2022-06-18 RX ADMIN — IPRATROPIUM BROMIDE AND ALBUTEROL SULFATE 3 ML: 2.5; .5 SOLUTION RESPIRATORY (INHALATION) at 03:08

## 2022-06-18 RX ADMIN — IPRATROPIUM BROMIDE AND ALBUTEROL SULFATE 3 ML: 2.5; .5 SOLUTION RESPIRATORY (INHALATION) at 07:23

## 2022-06-18 RX ADMIN — GABAPENTIN 300 MG: 300 CAPSULE ORAL at 08:22

## 2022-06-18 RX ADMIN — APIXABAN 5 MG: 5 TABLET, FILM COATED ORAL at 08:22

## 2022-06-18 RX ADMIN — IPRATROPIUM BROMIDE AND ALBUTEROL SULFATE 3 ML: 2.5; .5 SOLUTION RESPIRATORY (INHALATION) at 23:19

## 2022-06-18 RX ADMIN — APIXABAN 5 MG: 5 TABLET, FILM COATED ORAL at 17:18

## 2022-06-18 RX ADMIN — ALPRAZOLAM 2 MG: 0.5 TABLET ORAL at 21:43

## 2022-06-18 RX ADMIN — LOSARTAN POTASSIUM 50 MG: 50 TABLET, FILM COATED ORAL at 08:22

## 2022-06-18 RX ADMIN — INSULIN GLARGINE 60 UNITS: 100 INJECTION, SOLUTION SUBCUTANEOUS at 11:47

## 2022-06-18 RX ADMIN — PHENOL 1 SPRAY: 1.4 SPRAY ORAL at 17:19

## 2022-06-18 RX ADMIN — BUSPIRONE HYDROCHLORIDE 10 MG: 10 TABLET ORAL at 17:18

## 2022-06-18 RX ADMIN — INSULIN LISPRO 20 UNITS: 100 INJECTION, SOLUTION INTRAVENOUS; SUBCUTANEOUS at 11:51

## 2022-06-18 RX ADMIN — PANTOPRAZOLE SODIUM 40 MG: 40 TABLET, DELAYED RELEASE ORAL at 06:00

## 2022-06-18 RX ADMIN — POTASSIUM PHOSPHATE, MONOBASIC AND POTASSIUM PHOSPHATE, DIBASIC 30 MMOL: 224; 236 INJECTION, SOLUTION, CONCENTRATE INTRAVENOUS at 08:23

## 2022-06-18 RX ADMIN — DIGOXIN 250 MCG: 125 TABLET ORAL at 08:22

## 2022-06-18 RX ADMIN — INSULIN LISPRO 2 UNITS: 100 INJECTION, SOLUTION INTRAVENOUS; SUBCUTANEOUS at 11:51

## 2022-06-18 RX ADMIN — SERTRALINE HYDROCHLORIDE 50 MG: 50 TABLET ORAL at 08:22

## 2022-06-18 RX ADMIN — POTASSIUM & SODIUM PHOSPHATES POWDER PACK 280-160-250 MG 2 PACKET: 280-160-250 PACK at 01:34

## 2022-06-18 NOTE — ASSESSMENT & PLAN NOTE
· Patient with a history of COPD with chronic respiratory failure currently using 2 L of oxygen at home  On admission day, patient states that he had been using 4 L of oxygen in the past few days due to shortness of breath  · Mild tachypnea to low 20's POA in setting of metabolic acidosis with mild DKA  · CXR with R  base opacification suspicious for infiltrate vs  Atelectasis  Started on azithromycin and ceftriaxone  See pneumonia plan below  · SpO2 has been stable on nasal cannula  Using CPAP 12/5 40% HS     · Titrate O2 for goal SpO2 >88%

## 2022-06-18 NOTE — ASSESSMENT & PLAN NOTE
· SBP on admission 150s to 160s in setting of DKA  · Continue home losartan 50 mg daily and metoprolol 200 mg daily  · VS per unit protocol

## 2022-06-18 NOTE — ASSESSMENT & PLAN NOTE
· Hx of HFpEF    · Last echo 03/2022 with EF of 55%  · Continue home metoprolol and digoxin with holding parameters if BP is low  · Daily weights  · Strict I&O monitoring    Wt Readings from Last 3 Encounters:   05/23/22 126 kg (277 lb)   05/20/22 126 kg (277 lb)   04/28/22 124 kg (273 lb 5 9 oz)

## 2022-06-18 NOTE — ASSESSMENT & PLAN NOTE
· DKA in the setting of pneumonia as well as possible gastroenteritis  · Patient presented with complaint of shortness of breath and diarrhea for the past few days  He had not been checking his glucose at home however he states that he has been compliant with his home medication which include metformin, Victoza, 75 units of glargine b i d  And 35 units of NovoLog t i d  · Last A1c was 05/23/2022 at 8 6  · Glucose on presentation was above 600 with repeat of 539 after 1 L bolus of fluid    · Beta hydroxybutyrate 2 7  · Anion gap was 14, pH 7 4, bicarb 20 6  · Lactic acid 2 5>> 2 0  · Admitted to level 1 step down  · Started on insulin GTT algorithm 1 and IV fluids  · Continue monitor potassium, magnesium and BMP  · AG cleared overnight x2  · Started on clear liquid diet, advance as tolerated today   · Can likely transition to long acting, mealtime, and SSI coverage this AM  · Treatment for underlying pneumonia as per below plan

## 2022-06-18 NOTE — PROGRESS NOTES
Kit [facility]  66   Progress Note Lavon Davies 1960, 57 y o  male MRN: [id number]  Unit/Bed#: ICU 09 Encounter: [id number]  Primary Care Provider: Sherry Ramirez MD   Date and time admitted to hospital: 6/17/2022  7:23 AM    * Diabetic ketoacidosis without coma associated with type 2 diabetes mellitus (RUST 75 )  Assessment & Plan  · DKA in the setting of pneumonia as well as possible gastroenteritis  · Patient presented with complaint of shortness of breath and diarrhea for the past few days  He had not been checking his glucose at home however he states that he has been compliant with his home medication which include metformin, Victoza, 75 units of glargine b i d  And 35 units of NovoLog t i d  · Last A1c was 05/23/2022 at 8 6  · Glucose on presentation was above 600 with repeat of 539 after 1 L bolus of fluid  · Beta hydroxybutyrate 2 7  · Anion gap was 14, pH 7 4, bicarb 20 6  · Lactic acid 2 5>> 2 0  · Admitted to level 1 step down  · Started on insulin GTT algorithm 1 and IV fluids  · Continue monitor potassium, magnesium and BMP  · AG cleared overnight x2  · Started on clear liquid diet, advance as tolerated today   · Can likely transition to long acting, mealtime, and SSI coverage this AM  · Treatment for underlying pneumonia as per below plan      Chronic respiratory failure with hypoxia ([place] [id number] )  Assessment & Plan  · Patient with a history of COPD with chronic respiratory failure currently using 2 L of oxygen at home  On admission day, patient states that he had been using 4 L of oxygen in the past few days due to shortness of breath  · Mild tachypnea to low 20's POA in setting of metabolic acidosis with mild DKA  · CXR with R  base opacification suspicious for infiltrate vs  Atelectasis  Started on azithromycin and ceftriaxone  See pneumonia plan below  · SpO2 has been stable on nasal cannula  Using CPAP 12/5 40% HS     · Titrate O2 for goal SpO2 >88%    Right lower lobe pneumonia  Assessment & Plan  · Tachypnea POA with right lower lobe infiltrate/atelectasis on CXR  · Uses home O2 at 2L, increased to 4L 2/2 SOB past 2 days   · Patient was recently admitted 4/24/22 and is within 60 day window, however he still low criteria for Hcap  No history of MRSA  · Patient received 3 375 mg of Zosyn in ED  · Continue ceftriaxone and azithromycin D2  · Urine for strep PNA and legionella antigens negative  · Continuous pulse-oximetry, wean FiO2 for goal SpO2 >88%           Essential hypertension  Assessment & Plan  · SBP on admission 150s to 160s in setting of DKA  · Continue home losartan 50 mg daily and metoprolol 200 mg daily  · VS per unit protocol    Chronic diastolic CHF  Assessment & Plan  · Hx of HFpEF    · Last echo 03/2022 with EF of 55%  · Continue home metoprolol and digoxin with holding parameters if BP is low  · Daily weights  · Strict I&O monitoring    Wt Readings from Last 3 Encounters:   05/23/22 126 kg (277 lb)   05/20/22 126 kg (277 lb)   04/28/22 124 kg (273 lb 5 9 oz)           Insulin-dependent diabetes mellitus with neuropathy  Assessment & Plan  Lab Results   Component Value Date    HGBA1C 8 6 (H) 03/25/2022       Recent Labs     06/17/22  1413 06/17/22  1536 06/17/22  1803 06/17/22  2034   POCGLU 337* 330* 236* 330*       Blood Sugar Average: Last 72 hrs:  (P) 344   · See DKA plan above  · Can likely transition to long acting insulin with mealtime and SSI coverage today     Coronary artery disease  Assessment & Plan  · Continue ASA, Lipitor, Lopressor, losartan    Chronic pain syndrome  Assessment & Plan  · Continue home gabapentin 300 mg t i d     Leukocytosis  Assessment & Plan  · Patient with leukocytosis with WBC of 21 41 in the setting of right lower lobe pneumonia  · Continue ceftriaxone and azithromycin  · Will continue to monitor CBC  · Follow blood cultures and temperature curve    Atrial fibrillation  Assessment & Plan    · Tachycardic to low 100's on admission  · EKG showed Atrial fibrillation with no ST changes  · Continue home digoxin, metoprolol and Eliquis   · Continuous cardiac monitoring per unit protocol        Dyslipidemia  Assessment & Plan  · Continue Lipitor    Panic disorder without agoraphobia  Assessment & Plan  · Continue home sertraline 50 mg QD, Seroquel 100 mg QAM, Seroquel 300mg nightly, BuSpar 10 mg b i d   · Requested nightly alprazolam 2mg PRN, reordered after script confirmed via PDMP          ----------------------------------------------------------------------------------------  HPI/24hr events: Admitted yesterday in setting of RLL pneumonia and mild DKA  AG has since cleared x2  Patient reports improvements in symptoms, however, continues with barking cough  Weaned down to 3L NC last evening, on BiPap HS for SHAVONNE  Continues on insulin drip algorithm 1  Can likely transition to home regimen of long acting, mealtime, and SSI coverage today  No other acute changes  Patient appropriate for transfer out of the ICU today?: Patient does not meet criteria for ICU Follow-up Clinic; referral has not been made  Disposition: Transfer to Med Surg with Telemetry   Code Status: Level 1 - Full Code  ---------------------------------------------------------------------------------------  SUBJECTIVE  "I feel a little better"    Review of Systems   Constitutional: Positive for fatigue  Negative for chills and fever  HENT: Negative  Respiratory: Positive for cough (non-productive) and shortness of breath  Cardiovascular: Negative for chest pain, palpitations and leg swelling  Gastrointestinal: Positive for diarrhea (last episode last evening)  Endocrine: Negative  Genitourinary: Negative  Musculoskeletal: Negative  Neurological: Negative  Psychiatric/Behavioral: Negative        Review of systems was reviewed and negative unless stated above in HPI/24-hour events ---------------------------------------------------------------------------------------  OBJECTIVE    Vitals   Vitals:    22 0000 22 0200 22 0308 22 0400   BP: 119/55 124/60  115/55   BP Location:       Pulse: 100 99  99   Resp: (!) 26 (!) 24  (!) 27   Temp:    98 °F (36 7 °C)   TempSrc:       SpO2: 96% 96% 96% 95%   Weight:       Height:         Temp (24hrs), Av 1 °F (36 7 °C), Min:97 7 °F (36 5 °C), Max:99 2 °F (37 3 °C)  Current: Temperature: 98 °F (36 7 °C)          Respiratory:  SpO2: SpO2: 95 %, SpO2 Activity: SpO2 Activity: At Rest, SpO2 Device: O2 Device: BiPAP  Nasal Cannula O2 Flow Rate (L/min): 3 L/min    Invasive/non-invasive ventilation settings   Respiratory  Report   Lab Data (Last 4 hours)    None         O2/Vent Data (Last 4 hours)    None                Physical Exam  Vitals and nursing note reviewed  Constitutional:       General: He is not in acute distress  Appearance: He is obese  He is ill-appearing  He is not toxic-appearing  Interventions: Nasal cannula in place  HENT:      Head: Normocephalic and atraumatic  Nose: Nose normal       Mouth/Throat:      Mouth: Mucous membranes are moist    Cardiovascular:      Rate and Rhythm: Normal rate  Rhythm irregular  Pulses: Normal pulses  Heart sounds: Normal heart sounds  Pulmonary:      Effort: Pulmonary effort is normal  No accessory muscle usage or respiratory distress  Breath sounds: Decreased breath sounds present  Abdominal:      General: Bowel sounds are normal  There is no distension  Palpations: Abdomen is soft  Tenderness: There is no abdominal tenderness  Musculoskeletal:      Right lower leg: No edema  Left lower leg: No edema  Skin:     General: Skin is warm and dry  Capillary Refill: Capillary refill takes less than 2 seconds  Neurological:      General: No focal deficit present        Mental Status: He is alert and oriented to person, place, and time       GCS: GCS eye subscore is 4  GCS verbal subscore is 5  GCS motor subscore is 6  Motor: Motor function is intact  Psychiatric:         Attention and Perception: Attention normal          Mood and Affect: Mood normal          Behavior: Behavior is cooperative  Laboratory and Diagnostics:  Results from last 7 days   Lab Units 06/17/22  0752 06/14/22  0928   WBC Thousand/uL 21 41* 9 98   HEMOGLOBIN g/dL 14 2 14 0   HEMATOCRIT % 42 1 43 2   PLATELETS Thousands/uL 254 187   NEUTROS PCT %  --  46   BANDS PCT % 6  --    MONOS PCT %  --  7   MONO PCT % 4  --      Results from last 7 days   Lab Units 06/17/22  2359 06/17/22 2038 06/17/22  1333 06/17/22  0955 06/17/22  0752   SODIUM mmol/L 137 133* 143 129* 126*   POTASSIUM mmol/L 3 6 4 4 2 6* 4 5 4 2   CHLORIDE mmol/L 99* 95* 103 89* 85*   CO2 mmol/L 30 29 18* 26 27   ANION GAP mmol/L 8 9 22* 14* 14*   BUN mg/dL 18 21 20 29* 31*   CREATININE mg/dL 0 85 0 87 0 77 1 29 1 33*   CALCIUM mg/dL 8 5 8 5 6 1* 9 3 9 9   GLUCOSE RANDOM mg/dL 185* 326* 328* 539* 600*   ALT U/L  --   --   --   --  25   AST U/L  --   --   --   --  12   ALK PHOS U/L  --   --   --   --  86   ALBUMIN g/dL  --   --   --   --  3 9   TOTAL BILIRUBIN mg/dL  --   --   --   --  1 36*     Results from last 7 days   Lab Units 06/17/22  2359 06/17/22 2038 06/17/22  1333   MAGNESIUM mg/dL 2 5 2 7* 1 5*   PHOSPHORUS mg/dL 1 5* 2 4 1 9*      Results from last 7 days   Lab Units 06/17/22  0752   INR  1 05   PTT seconds 25          Results from last 7 days   Lab Units 06/17/22  1152 06/17/22  0955 06/17/22  0752   LACTIC ACID mmol/L 2 0 2 5* 2 8*     ABG:    VBG:  Results from last 7 days   Lab Units 06/17/22  1152   PH CYDNEY  7 414*   PCO2 CYDNEY mm Hg 32 9*   PO2 CYDNEY mm Hg 55 6*   HCO3 CYDNEY mmol/L 20 6*   BASE EXC CYDNEY mmol/L -3 1           Micro  Results from last 7 days   Lab Units 06/17/22  0946 06/17/22  0752   BLOOD CULTURE   --  Received in Microbiology Lab  Culture in Progress    Received in Microbiology Lab  Culture in Progress  LEGIONELLA URINARY ANTIGEN  Negative  --    STREP PNEUMONIAE ANTIGEN, URINE  Negative  --        EKG: Afib, rate 90s  Imaging: I have personally reviewed pertinent reports  and No new imaging    Intake and Output  I/O       06/16 0701 06/17 0700 06/17 0701  06/18 0700    I V  (mL/kg)  3739 1 (31 4)    IV Piggyback  1400 2    Total Intake(mL/kg)  5139 3 (43 2)    Urine (mL/kg/hr)  850    Total Output  850    Net  +4289 3          Unmeasured Urine Occurrence  1 x          Height and Weights   Height: 5' 11" (180 3 cm)  IBW (Ideal Body Weight): 75 3 kg  Body mass index is 36 53 kg/m²  Weight (last 2 days)     Date/Time Weight    06/17/22 1245 119 (261 91)            Nutrition       Diet Orders   (From admission, onward)             Start     Ordered    06/18/22 0537  Diet Campos/CHO Controlled; Diabetic CL Liquids  Diet effective now        References:    Nutrtion Support Algorithm Enteral vs  Parenteral   Question Answer Comment   Diet Type Campos/CHO Controlled    Campos/CHO Controlled Diabetic CL Liquids    RD to adjust diet per protocol?  Yes        06/18/22 0536    06/17/22 1520  Room Service  Once        Question:  Type of Service  Answer:  Room Service-Appropriate    06/17/22 1519                  Active Medications  Scheduled Meds:  Current Facility-Administered Medications   Medication Dose Route Frequency Provider Last Rate    acetaminophen  650 mg Oral Q6H PRN Delio Quant, DO      albuterol  2 5 mg Nebulization Q6H PRN Delio Quant, DO      ALPRAZolam  2 mg Oral HS PRN MANAS Stokes      apixaban  5 mg Oral BID Delio Quant, DO      aspirin  81 mg Oral Daily Delio [facility]Coll[facility]      atorvastatin  80 mg Oral Daily With Kennedy [place]      azithromycin  500 mg Intravenous Q24H Delio Quant, DO Stopped (06/17/22 1900)    baclofen  10 mg Oral Q8H PRN Delio Quant, DO      busPIRone  10 mg Oral BID Delio Quant, DO      cefTRIAXone  1,000 mg Intravenous Q24H Keisha Diaz, DO Stopped (06/17/22 1607)    cholecalciferol  1,000 Units Oral Daily Keisha Diaz, DO      dextrose 5% lactated ringer's  75 mL/hr Intravenous Continuous MANAS Puentes 125 mL/hr (06/18/22 0000)    digoxin  250 mcg Oral Daily Keisha Diaz, DO      gabapentin  300 mg Oral TID Keisha Diaz, DO      guaiFENesin  600 mg Oral E07Q [address] William Joaquim [place]      hydrocodone-chlorpheniramine polistirex  5 mL Oral HS Chris Jean Baptiste [place]      insulin regular (HumuLIN R,NovoLIN R) infusion  0 3-21 Units/hr Intravenous Titrated Keisha Diaz,  9 Units/hr (06/18/22 0400)    ipratropium-albuterol  3 mL Nebulization C8F Chris Jean Baptiste PA-C      losartan  50 mg Oral Daily [place], [place]      melatonin  3 mg Oral HS PRN MANAS Puentes      metoprolol succinate  200 mg Oral Daily Keisha Diaz, DO      nystatin   Topical BID MANAS Puentes      pantoprazole  40 mg Oral Early Morning Keisha Diaz, DO      phenol  1 spray Mouth/Throat Z4N PRN hCris Jean Baptiste PA-C      QUEtiapine  100 mg Oral QAM [place], [place]      QUEtiapine  300 mg Oral HS Keisha Diaz, DO      sertraline  50 mg Oral Daily Keisha Diaz DO       Continuous Infusions:  dextrose 5% lactated ringer's, 75 mL/hr, Last Rate: 125 mL/hr (06/18/22 0000)  insulin regular (HumuLIN R,NovoLIN R) infusion, 0 3-21 Units/hr, Last Rate: 9 Units/hr (06/18/22 0400)      PRN Meds:   acetaminophen, 650 mg, Q6H PRN  albuterol, 2 5 mg, Q6H PRN  ALPRAZolam, 2 mg, HS PRN  baclofen, 10 mg, Q8H PRN  melatonin, 3 mg, HS PRN  phenol, 1 spray, Q2H PRN        Invasive Devices Review  Invasive Devices  Report    Peripheral Intravenous Line  Duration           Peripheral IV 06/17/22 Dorsal (posterior); Left Hand <1 day    Peripheral IV 06/17/22 Left Hand <1 day    Peripheral IV 06/17/22 Right Wrist <1 day                Rationale for remaining devices: Critical illness  ---------------------------------------------------------------------------------------  Advance Directive and Living Will:      Power of :    POLST:    ---------------------------------------------------------------------------------------  Care Time Delivered:   No Critical Care time spent       [facility]      Portions of the record may have been created with voice recognition software  Occasional wrong word or "sound a like" substitutions may have occurred due to the inherent limitations of voice recognition software    Read the chart carefully and recognize, using context, where substitutions have occurred

## 2022-06-18 NOTE — QUICK NOTE
Patient is here with mild diabetic ketoacidosis in setting of acute right lower lobe pneumonia  C diff rule out  He was started on insulin sliding scale and totally received 180+ U of insulin since admission over about 21 hour  Also Received 6 L of IV fluid yesterday  His anion gap closed and patient tolerates clear liquid diet well  Home regimen consists of glargine 75 units b i d , NovoLog 35 units t i d , and Victoza  Patient initial GCS Zosyn and then was continued on azithromycin ceftriaxone  Plan:  - discontinue azithromycin continue ceftriaxone IV for community-acquired pneumonia  - start on Lantus 60 units b i d  And NovoLog 20 units t i d  Cover with sliding scale    - monitor blood glucose closely and adjust sq insulin as needed  - tentative transferred to floors  - substitute home Trelegy for [facility] [place] [facility]

## 2022-06-18 NOTE — ASSESSMENT & PLAN NOTE
· Continue home sertraline 50 mg QD, Seroquel 100 mg QAM, Seroquel 300mg nightly, BuSpar 10 mg b i d   · Requested nightly alprazolam 2mg PRN, reordered after script confirmed via PDMP

## 2022-06-18 NOTE — ASSESSMENT & PLAN NOTE
· Tachypnea POA with right lower lobe infiltrate/atelectasis on CXR  · Uses home O2 at 2L, increased to 4L 2/2 SOB past 2 days   · Patient was recently admitted 4/24/22 and is within 60 day window, however he still low criteria for Hcap    No history of MRSA  · Patient received 3 375 mg of Zosyn in ED  · Continue ceftriaxone and azithromycin D2  · Urine for strep PNA and legionella antigens negative  · Continuous pulse-oximetry, wean FiO2 for goal SpO2 >88%

## 2022-06-18 NOTE — ASSESSMENT & PLAN NOTE
Lab Results   Component Value Date    HGBA1C 8 6 (H) 03/25/2022       Recent Labs     06/17/22  1413 06/17/22  1536 06/17/22  1803 06/17/22 2034   POCGLU 337* 330* 236* 330*       Blood Sugar Average: Last 72 hrs:  (P) 344   · See DKA plan above  · Can likely transition to long acting insulin with mealtime and SSI coverage today

## 2022-06-18 NOTE — ASSESSMENT & PLAN NOTE
· Tachycardic to low 100's on admission  · EKG showed Atrial fibrillation with no ST changes  · Continue home digoxin, metoprolol and Eliquis   · Continuous cardiac monitoring per unit protocol

## 2022-06-18 NOTE — ASSESSMENT & PLAN NOTE
· Patient with leukocytosis with WBC of 21 41 in the setting of right lower lobe pneumonia  · Continue ceftriaxone and azithromycin  · Will continue to monitor CBC  · Follow blood cultures and temperature curve

## 2022-06-19 PROBLEM — R65.20 SEVERE SEPSIS (HCC): Status: ACTIVE | Noted: 2020-10-29

## 2022-06-19 PROBLEM — A41.9 SEVERE SEPSIS (HCC): Status: ACTIVE | Noted: 2020-10-29

## 2022-06-19 LAB
ANION GAP SERPL CALCULATED.3IONS-SCNC: 7 MMOL/L (ref 4–13)
BASOPHILS # BLD AUTO: 0.04 THOUSANDS/ΜL (ref 0–0.1)
BASOPHILS NFR BLD AUTO: 0 % (ref 0–1)
BUN SERPL-MCNC: 14 MG/DL (ref 5–25)
C DIFF TOX GENS STL QL NAA+PROBE: NEGATIVE
CALCIUM SERPL-MCNC: 8.4 MG/DL (ref 8.3–10.1)
CHLORIDE SERPL-SCNC: 98 MMOL/L (ref 100–108)
CO2 SERPL-SCNC: 31 MMOL/L (ref 21–32)
CREAT SERPL-MCNC: 0.78 MG/DL (ref 0.6–1.3)
EOSINOPHIL # BLD AUTO: 0.23 THOUSAND/ΜL (ref 0–0.61)
EOSINOPHIL NFR BLD AUTO: 2 % (ref 0–6)
ERYTHROCYTE [DISTWIDTH] IN BLOOD BY AUTOMATED COUNT: 13.7 % (ref 11.6–15.1)
GFR SERPL CREATININE-BSD FRML MDRD: 96 ML/MIN/1.73SQ M
GLUCOSE SERPL-MCNC: 183 MG/DL (ref 65–140)
GLUCOSE SERPL-MCNC: 199 MG/DL (ref 65–140)
GLUCOSE SERPL-MCNC: 282 MG/DL (ref 65–140)
GLUCOSE SERPL-MCNC: 303 MG/DL (ref 65–140)
HCT VFR BLD AUTO: 38.1 % (ref 36.5–49.3)
HGB BLD-MCNC: 12.3 G/DL (ref 12–17)
IMM GRANULOCYTES # BLD AUTO: 0.06 THOUSAND/UL (ref 0–0.2)
IMM GRANULOCYTES NFR BLD AUTO: 1 % (ref 0–2)
LYMPHOCYTES # BLD AUTO: 4.56 THOUSANDS/ΜL (ref 0.6–4.47)
LYMPHOCYTES NFR BLD AUTO: 39 % (ref 14–44)
MCH RBC QN AUTO: 30.1 PG (ref 26.8–34.3)
MCHC RBC AUTO-ENTMCNC: 32.3 G/DL (ref 31.4–37.4)
MCV RBC AUTO: 93 FL (ref 82–98)
MONOCYTES # BLD AUTO: 0.73 THOUSAND/ΜL (ref 0.17–1.22)
MONOCYTES NFR BLD AUTO: 6 % (ref 4–12)
NEUTROPHILS # BLD AUTO: 6.14 THOUSANDS/ΜL (ref 1.85–7.62)
NEUTS SEG NFR BLD AUTO: 52 % (ref 43–75)
NRBC BLD AUTO-RTO: 0 /100 WBCS
PLATELET # BLD AUTO: 156 THOUSANDS/UL (ref 149–390)
PMV BLD AUTO: 8.9 FL (ref 8.9–12.7)
POTASSIUM SERPL-SCNC: 3.5 MMOL/L (ref 3.5–5.3)
RBC # BLD AUTO: 4.09 MILLION/UL (ref 3.88–5.62)
SODIUM SERPL-SCNC: 136 MMOL/L (ref 136–145)
WBC # BLD AUTO: 11.76 THOUSAND/UL (ref 4.31–10.16)

## 2022-06-19 PROCEDURE — 82948 REAGENT STRIP/BLOOD GLUCOSE: CPT

## 2022-06-19 PROCEDURE — 85025 COMPLETE CBC W/AUTO DIFF WBC: CPT | Performed by: INTERNAL MEDICINE

## 2022-06-19 PROCEDURE — 94660 CPAP INITIATION&MGMT: CPT

## 2022-06-19 PROCEDURE — 80048 BASIC METABOLIC PNL TOTAL CA: CPT | Performed by: INTERNAL MEDICINE

## 2022-06-19 PROCEDURE — 94669 MECHANICAL CHEST WALL OSCILL: CPT

## 2022-06-19 PROCEDURE — 0T9B70Z DRAINAGE OF BLADDER WITH DRAINAGE DEVICE, VIA NATURAL OR ARTIFICIAL OPENING: ICD-10-PCS | Performed by: PHYSICIAN ASSISTANT

## 2022-06-19 PROCEDURE — 94760 N-INVAS EAR/PLS OXIMETRY 1: CPT

## 2022-06-19 PROCEDURE — 94668 MNPJ CHEST WALL SBSQ: CPT

## 2022-06-19 PROCEDURE — 94640 AIRWAY INHALATION TREATMENT: CPT

## 2022-06-19 PROCEDURE — 99232 SBSQ HOSP IP/OBS MODERATE 35: CPT | Performed by: PHYSICIAN ASSISTANT

## 2022-06-19 RX ORDER — INSULIN LISPRO 100 [IU]/ML
30 INJECTION, SOLUTION INTRAVENOUS; SUBCUTANEOUS
Status: DISCONTINUED | OUTPATIENT
Start: 2022-06-19 | End: 2022-06-20

## 2022-06-19 RX ORDER — POTASSIUM CHLORIDE 20 MEQ/1
40 TABLET, EXTENDED RELEASE ORAL ONCE
Status: COMPLETED | OUTPATIENT
Start: 2022-06-19 | End: 2022-06-19

## 2022-06-19 RX ORDER — TAMSULOSIN HYDROCHLORIDE 0.4 MG/1
0.4 CAPSULE ORAL
Status: DISCONTINUED | OUTPATIENT
Start: 2022-06-19 | End: 2022-06-21 | Stop reason: HOSPADM

## 2022-06-19 RX ORDER — INSULIN GLARGINE 100 [IU]/ML
65 INJECTION, SOLUTION SUBCUTANEOUS EVERY 12 HOURS SCHEDULED
Status: DISCONTINUED | OUTPATIENT
Start: 2022-06-19 | End: 2022-06-20

## 2022-06-19 RX ADMIN — Medication 1000 UNITS: at 08:02

## 2022-06-19 RX ADMIN — GABAPENTIN 300 MG: 300 CAPSULE ORAL at 21:16

## 2022-06-19 RX ADMIN — QUETIAPINE FUMARATE 100 MG: 50 TABLET, EXTENDED RELEASE ORAL at 08:03

## 2022-06-19 RX ADMIN — BUSPIRONE HYDROCHLORIDE 10 MG: 10 TABLET ORAL at 08:02

## 2022-06-19 RX ADMIN — INSULIN LISPRO 25 UNITS: 100 INJECTION, SOLUTION INTRAVENOUS; SUBCUTANEOUS at 07:53

## 2022-06-19 RX ADMIN — INSULIN LISPRO 30 UNITS: 100 INJECTION, SOLUTION INTRAVENOUS; SUBCUTANEOUS at 16:49

## 2022-06-19 RX ADMIN — FLUTICASONE FUROATE AND VILANTEROL TRIFENATATE 1 PUFF: 100; 25 POWDER RESPIRATORY (INHALATION) at 08:03

## 2022-06-19 RX ADMIN — GABAPENTIN 300 MG: 300 CAPSULE ORAL at 08:03

## 2022-06-19 RX ADMIN — IPRATROPIUM BROMIDE AND ALBUTEROL SULFATE 3 ML: 2.5; .5 SOLUTION RESPIRATORY (INHALATION) at 03:03

## 2022-06-19 RX ADMIN — HYDROCODONE POLISTIREX AND CHLORPHENIRAMINE POLISTIREX 5 ML: 10; 8 SUSPENSION, EXTENDED RELEASE ORAL at 21:16

## 2022-06-19 RX ADMIN — IPRATROPIUM BROMIDE AND ALBUTEROL SULFATE 3 ML: 2.5; .5 SOLUTION RESPIRATORY (INHALATION) at 07:27

## 2022-06-19 RX ADMIN — IPRATROPIUM BROMIDE AND ALBUTEROL SULFATE 3 ML: 2.5; .5 SOLUTION RESPIRATORY (INHALATION) at 11:37

## 2022-06-19 RX ADMIN — METOPROLOL SUCCINATE 200 MG: 100 TABLET, EXTENDED RELEASE ORAL at 08:12

## 2022-06-19 RX ADMIN — NYSTATIN: 100000 POWDER TOPICAL at 09:48

## 2022-06-19 RX ADMIN — INSULIN LISPRO 25 UNITS: 100 INJECTION, SOLUTION INTRAVENOUS; SUBCUTANEOUS at 11:43

## 2022-06-19 RX ADMIN — SERTRALINE HYDROCHLORIDE 50 MG: 50 TABLET ORAL at 08:11

## 2022-06-19 RX ADMIN — CEFTRIAXONE 1000 MG: 1 INJECTION, SOLUTION INTRAVENOUS at 15:58

## 2022-06-19 RX ADMIN — APIXABAN 5 MG: 5 TABLET, FILM COATED ORAL at 17:17

## 2022-06-19 RX ADMIN — GABAPENTIN 300 MG: 300 CAPSULE ORAL at 15:58

## 2022-06-19 RX ADMIN — INSULIN GLARGINE 65 UNITS: 100 INJECTION, SOLUTION SUBCUTANEOUS at 21:16

## 2022-06-19 RX ADMIN — ATORVASTATIN CALCIUM 80 MG: 40 TABLET, FILM COATED ORAL at 15:58

## 2022-06-19 RX ADMIN — INSULIN GLARGINE 60 UNITS: 100 INJECTION, SOLUTION SUBCUTANEOUS at 08:02

## 2022-06-19 RX ADMIN — INSULIN LISPRO 2 UNITS: 100 INJECTION, SOLUTION INTRAVENOUS; SUBCUTANEOUS at 06:29

## 2022-06-19 RX ADMIN — LOSARTAN POTASSIUM 50 MG: 50 TABLET, FILM COATED ORAL at 08:11

## 2022-06-19 RX ADMIN — APIXABAN 5 MG: 5 TABLET, FILM COATED ORAL at 08:02

## 2022-06-19 RX ADMIN — TAMSULOSIN HYDROCHLORIDE 0.4 MG: 0.4 CAPSULE ORAL at 15:58

## 2022-06-19 RX ADMIN — POTASSIUM CHLORIDE 40 MEQ: 1500 TABLET, EXTENDED RELEASE ORAL at 09:00

## 2022-06-19 RX ADMIN — IPRATROPIUM BROMIDE AND ALBUTEROL SULFATE 3 ML: 2.5; .5 SOLUTION RESPIRATORY (INHALATION) at 15:04

## 2022-06-19 RX ADMIN — Medication 3 MG: at 21:20

## 2022-06-19 RX ADMIN — ALBUTEROL SULFATE 2.5 MG: 2.5 SOLUTION RESPIRATORY (INHALATION) at 18:02

## 2022-06-19 RX ADMIN — PANTOPRAZOLE SODIUM 40 MG: 40 TABLET, DELAYED RELEASE ORAL at 05:23

## 2022-06-19 RX ADMIN — GUAIFENESIN 600 MG: 600 TABLET, EXTENDED RELEASE ORAL at 08:02

## 2022-06-19 RX ADMIN — ALPRAZOLAM 2 MG: 0.5 TABLET ORAL at 21:20

## 2022-06-19 RX ADMIN — IPRATROPIUM BROMIDE AND ALBUTEROL SULFATE 3 ML: 2.5; .5 SOLUTION RESPIRATORY (INHALATION) at 23:00

## 2022-06-19 RX ADMIN — INSULIN LISPRO 8 UNITS: 100 INJECTION, SOLUTION INTRAVENOUS; SUBCUTANEOUS at 15:59

## 2022-06-19 RX ADMIN — GUAIFENESIN 600 MG: 600 TABLET, EXTENDED RELEASE ORAL at 21:16

## 2022-06-19 RX ADMIN — ACETAMINOPHEN 650 MG: 325 TABLET ORAL at 20:33

## 2022-06-19 RX ADMIN — QUETIAPINE FUMARATE 300 MG: 100 TABLET ORAL at 21:16

## 2022-06-19 RX ADMIN — INSULIN LISPRO 2 UNITS: 100 INJECTION, SOLUTION INTRAVENOUS; SUBCUTANEOUS at 21:16

## 2022-06-19 RX ADMIN — BUSPIRONE HYDROCHLORIDE 10 MG: 10 TABLET ORAL at 17:18

## 2022-06-19 RX ADMIN — INSULIN LISPRO 6 UNITS: 100 INJECTION, SOLUTION INTRAVENOUS; SUBCUTANEOUS at 11:28

## 2022-06-19 RX ADMIN — IPRATROPIUM BROMIDE AND ALBUTEROL SULFATE 3 ML: 2.5; .5 SOLUTION RESPIRATORY (INHALATION) at 20:05

## 2022-06-19 RX ADMIN — ASPIRIN 81 MG CHEWABLE TABLET 81 MG: 81 TABLET CHEWABLE at 08:02

## 2022-06-19 RX ADMIN — DIGOXIN 250 MCG: 125 TABLET ORAL at 08:02

## 2022-06-19 RX ADMIN — NYSTATIN: 100000 POWDER TOPICAL at 17:18

## 2022-06-19 NOTE — ASSESSMENT & PLAN NOTE
Lab Results   Component Value Date    HGBA1C 8 6 (H) 03/25/2022       Recent Labs     06/18/22  1249 06/18/22  1554 06/18/22  2134 06/19/22  0627   POCGLU 163* 268* 270* 183*     Blood Sugar Average: Last 72 hrs:  (P) 870 [id number]   · See DKA plan above

## 2022-06-19 NOTE — ASSESSMENT & PLAN NOTE
· Severe Sepsis, POA, due to pneumonia as evidenced by WBC > 12 00 (21 41) and HR > 90, with lactate > 2 0 (2 8, 2 5); requiring IV Zosyn in the ED, IV Rocephin, blood cultures x 2, and lactic acid levels  · Improving, WBCs 11 76 today    · Azithromycin discontinued, continue IV ceftriaxone  · Patient with diarrhea, 2 episodes last night but none today so far  · BC x2: NGTD  · C diff culture pending  · Will continue to monitor CBC  · Follow blood cultures and temperature curve

## 2022-06-19 NOTE — ASSESSMENT & PLAN NOTE
· Patient with a history of COPD with chronic respiratory failure currently using 2-3L of O2, titrates up to 4-6L of O2 with exertion at baseline  On admission day, patient states that he had been using 4 L of oxygen in the past few days due to shortness of breath  · Mild tachypnea to low 20's POA in setting of metabolic acidosis with mild DKA  · CXR with R  base opacification suspicious for infiltrate vs  Atelectasis  · Started on azithromycin and ceftriaxone  See pneumonia plan below  · Substituted Trelergy for Breo  · Patient at his baseline O2 requirements of 2-3 L today  · SpO2 has been stable on nasal cannula  Using CPAP 12/5 40% HS     · Titrate O2 for goal SpO2 >88%

## 2022-06-19 NOTE — ASSESSMENT & PLAN NOTE
· SBP on admission 150s-160s in setting of DKA, overall stable in 130s today  · Continue home losartan 50 mg daily and metoprolol 200 mg daily  · VS per unit protocol

## 2022-06-19 NOTE — ASSESSMENT & PLAN NOTE
Lab Results   Component Value Date    HGBA1C 8 6 (H) 03/25/2022       Recent Labs     06/18/22  1249 06/18/22  1554 06/18/22  2134 06/19/22  0627   POCGLU 163* 268* 270* 183*       Blood Sugar Average: Last 72 hrs:  (P) 238 [id number]   · DKA POA as evidenced by glucose >600, Beta hydroxybutyrate 2 7, anion gap 14, pH 7 4, bicarb 20 6  Patient presented with SOB and diarrhea past few days, reports compliance with insulin but not blood glucose checks at home  · Home regimen: metformin, Victoza, 75 units of glargine b i d  and 35 units of NovoLog t i d  · Last A1c was 05/23/2022 at 8 6  · Patient started on insulin drip and received 6L IV fluids per DKA protocol, admitted to level on step-down under Critical Care Service, titrated off insulin drip and resumed scheduled insulin 6/18  Anion gap closed since 06/17    · Patient started on Lantus 60 units b i d  and NovoLog 20 units t i d  with SSI coverage  · Blood glucose today overall stable 180-280s, will adjust insulin regimen as indicated  · Hypoglycemia protocol  · Carb controlled diet  · Endocrinology consult

## 2022-06-19 NOTE — PROGRESS NOTES
[address] [address]  Progress Note Grisel Cassidy 1960, 57 y o  male MRN: [id number]  Unit/Bed#: ICU 09 Encounter: [id number]  Primary Care Provider: Goyo Jimenes MD   Date and time admitted to hospital: 6/17/2022  7:23 AM    * Diabetic ketoacidosis without coma associated with type 2 diabetes mellitus [facility])  Assessment & Plan  Lab Results   Component Value Date    HGBA1C 8 6 (H) 03/25/2022       Recent Labs     06/18/22  1249 06/18/22  1554 06/18/22  2134 06/19/22  0627   POCGLU 163* 268* 270* 183*       Blood Sugar Average: Last 72 hrs:  (P) 238 [id number]   · DKA POA as evidenced by glucose >600, Beta hydroxybutyrate 2 7, anion gap 14, pH 7 4, bicarb 20 6  Patient presented with SOB and diarrhea past few days, reports compliance with insulin but not blood glucose checks at home  · Home regimen: metformin, Victoza, 75 units of glargine b i d  and 35 units of NovoLog t i d  · Last A1c was 05/23/2022 at 8 6  · Patient started on insulin drip and received 6L IV fluids per DKA protocol, admitted to level on step-down under Critical Care Service, titrated off insulin drip and resumed scheduled insulin 6/18  Anion gap closed since 06/17  · Patient started on Lantus 60 units b i d  and NovoLog 20 units t i d  with SSI coverage  · Blood glucose today overall stable 180-280s, will adjust insulin regimen as indicated  · Hypoglycemia protocol  · Carb controlled diet  · Endocrinology consult    Severe sepsis (HCC)  Assessment & Plan  · Severe Sepsis, POA, due to pneumonia as evidenced by WBC > 12 00 (21 41) and HR > 90, with lactate > 2 0 (2 8, 2 5); requiring IV Zosyn in the ED, IV Rocephin, blood cultures x 2, and lactic acid levels  · Improving, WBCs 11 76 today    · Azithromycin discontinued, continue IV ceftriaxone  · Patient with diarrhea, 2 episodes last night but none today so far  · BC x2: NGTD  · C diff culture pending  · Will continue to monitor CBC  · Follow blood cultures and temperature curve    Right lower lobe pneumonia  Assessment & Plan  · Tachypnea POA with right lower lobe infiltrate/atelectasis on CXR  · Patient was recently admitted 4/24/22 and is within 60 day window, however he still low criteria for Hcap  No history of MRSA  · Patient received IV Zosyn in ED  Received 1 dose azithromycin and was then discontinued  · Day 3 IV ceftriaxone, continue  · Urine for strep PNA and legionella antigens negative  · Continuous pulse-oximetry, wean FiO2 for goal SpO2 >88%    Chronic diastolic CHF  Assessment & Plan  Wt Readings from Last 3 Encounters:   06/19/22 123 kg (270 lb 8 1 oz)   05/23/22 126 kg (277 lb)   05/20/22 126 kg (277 lb)     · Hx of HFpEF  Last ECHO 03/2022 with EF of 55%  · Continue home metoprolol and digoxin with holding parameters if BP is low  · Daily weights  · Strict I&O monitoring    Chronic pain syndrome  Assessment & Plan  · Continue home gabapentin 300 mg t i d  Atrial fibrillation  Assessment & Plan  · Tachycardic to low 100's on admission  · EKG showed Atrial fibrillation with no ST changes  · Continue home digoxin, metoprolol and Eliquis   · Continuous cardiac monitoring per unit protocol    Dyslipidemia  Assessment & Plan  · Continue Lipitor    Chronic respiratory failure with hypoxia (HCC)  Assessment & Plan  · Patient with a history of COPD with chronic respiratory failure currently using 2-3L of O2, titrates up to 4-6L of O2 with exertion at baseline  On admission day, patient states that he had been using 4 L of oxygen in the past few days due to shortness of breath  · Mild tachypnea to low 20's POA in setting of metabolic acidosis with mild DKA  · CXR with R  base opacification suspicious for infiltrate vs  Atelectasis  · Started on azithromycin and ceftriaxone  See pneumonia plan below  · Substituted Trelergy for Breo  · Patient at his baseline O2 requirements of 2-3 L today  · SpO2 has been stable on nasal cannula   Using CPAP 12/5 40% HS    · Titrate O2 for goal SpO2 >88%    Panic disorder without agoraphobia  Assessment & Plan  · Continue home sertraline 50 mg QD, Seroquel 100 mg QAM, Seroquel 300mg nightly, BuSpar 10 mg b i d   · Requested nightly alprazolam 2mg PRN, reordered after script confirmed via PDMP      Essential hypertension  Assessment & Plan  · SBP on admission 150s-160s in setting of DKA, overall stable in 130s today  · Continue home losartan 50 mg daily and metoprolol 200 mg daily  · VS per unit protocol    Coronary artery disease  Assessment & Plan  · Continue ASA, Lipitor, Lopressor, Losartan    Insulin-dependent diabetes mellitus with neuropathy  Assessment & Plan  Lab Results   Component Value Date    HGBA1C 8 6 (H) 03/25/2022       Recent Labs     06/18/22  1249 06/18/22  1554 06/18/22  2134 06/19/22  0627   POCGLU 163* 268* 270* 183*     Blood Sugar Average: Last 72 hrs:  (P) 888 [id number]   · See DKA plan above          VTE Pharmacologic Prophylaxis:   High Risk (Score >/= 5) - Pharmacological DVT Prophylaxis Ordered: apixaban (Eliquis)  Sequential Compression Devices Ordered  Patient Centered Rounds: I performed bedside rounds with nursing staff today  Discussions with Specialists or Other Care Team Provider:  None    Education and Discussions with Family / Patient: Patient declined call to   Time Spent for Care: 45 minutes  More than 50% of total time spent on counseling and coordination of care as described above  Current Length of Stay: 2 day(s)  Current Patient Status: Inpatient   Certification Statement: The patient will continue to require additional inpatient hospital stay due to Pending C diff results, improvement on IV antibiotics  Discharge Plan: Anticipate discharge in 24-48 hrs to discharge location to be determined pending rehab evaluations      Code Status: Level 1 - Full Code    Subjective:   Patient seen at bedside this a m , reports mild SOB, otherwise states that his appetite is improved and was able to tolerate full meal   Patient also reports diarrhea, 2 episodes last night but none today  Objective:     Vitals:   Temp (24hrs), Av 1 °F (36 2 °C), Min:96 5 °F (35 8 °C), Max:97 6 °F (36 4 °C)    Temp:  [96 5 °F (35 8 °C)-97 6 °F (36 4 °C)] 96 8 °F (36 °C)  HR:  [] 102  Resp:  [18-30] 30  BP: ()/(51-75) 133/73  SpO2:  [93 %-98 %] 93 %  Body mass index is 37 73 kg/m²  Input and Output Summary (last 24 hours): Intake/Output Summary (Last 24 hours) at 2022 1331  Last data filed at 2022 1101  Gross per 24 hour   Intake 250 ml   Output 2875 ml   Net -2625 ml       Physical Exam:   Physical Exam  Constitutional:       General: He is not in acute distress  Appearance: He is not ill-appearing, toxic-appearing or diaphoretic  Cardiovascular:      Rate and Rhythm: Normal rate and regular rhythm  Pulses: Normal pulses  Heart sounds: Normal heart sounds  Pulmonary:      Effort: Pulmonary effort is normal  No respiratory distress  Comments: Somewhat decreased breath sounds across lung fields  Abdominal:      General: Bowel sounds are normal  There is no distension  Palpations: Abdomen is soft  Tenderness: There is no abdominal tenderness  Musculoskeletal:         General: No swelling or tenderness  Skin:     General: Skin is warm  Neurological:      General: No focal deficit present  Mental Status: He is alert     Psychiatric:         Mood and Affect: Mood normal          Behavior: Behavior normal          Additional Data:     Labs:  Results from last 7 days   Lab Units 22  0549 22  0600 22  0752   WBC Thousand/uL 11 76*   < > 21 41*   HEMOGLOBIN g/dL 12 3   < > 14 2   HEMATOCRIT % 38 1   < > 42 1   PLATELETS Thousands/uL 156   < > 254   BANDS PCT %  --   --  6   NEUTROS PCT % 52   < >  --    LYMPHS PCT % 39   < >  --    LYMPHO PCT %  --   --  20   MONOS PCT % 6   < >  --    MONO PCT %  --   -- 4   EOS PCT % 2   < > 0    < > = values in this interval not displayed  Results from last 7 days   Lab Units 06/19/22  0549 06/18/22  0600   SODIUM mmol/L 136 137   POTASSIUM mmol/L 3 5 4 0   CHLORIDE mmol/L 98* 102   CO2 mmol/L 31 30   BUN mg/dL 14 17   CREATININE mg/dL 0 78 0 84   ANION GAP mmol/L 7 5   CALCIUM mg/dL 8 4 8 4   ALBUMIN g/dL  --  2 9*   TOTAL BILIRUBIN mg/dL  --  0 68   ALK PHOS U/L  --  51   ALT U/L  --  19   AST U/L  --  15   GLUCOSE RANDOM mg/dL 199* 160*     Results from last 7 days   Lab Units 06/17/22  0752   INR  1 05     Results from last 7 days   Lab Units 06/19/22  1123 06/19/22  0627 06/18/22  2134 06/18/22  1554 06/18/22  1249 06/18/22  1146 06/18/22  1006 06/18/22  0754 06/18/22  0558 06/18/22  0359 06/18/22  0204 06/17/22  2358   POC GLUCOSE mg/dl 282* 183* 270* 268* 163* 143* 203* 181* 152* 180* 155* 185*         Results from last 7 days   Lab Units 06/18/22  0600 06/17/22  1152 06/17/22  0955 06/17/22  0752   LACTIC ACID mmol/L  --  2 0 2 5* 2 8*   PROCALCITONIN ng/ml 0 64*  --   --   --        Lines/Drains:  Invasive Devices  Report    Peripheral Intravenous Line  Duration           Peripheral IV 06/17/22 Left Hand 2 days    Peripheral IV 06/17/22 Right Wrist 2 days    Peripheral IV 06/17/22 Dorsal (posterior); Left Hand 1 day          Drain  Duration           Urethral Catheter 16 Fr  <1 day              Urinary Catheter:  Goal for removal: Voiding trial when ambulation improves               Imaging: Reviewed radiology reports from this admission including: chest xray    Recent Cultures (last 7 days):   Results from last 7 days   Lab Units 06/18/22  1828 06/17/22  0946 06/17/22  0752   BLOOD CULTURE   --   --  No Growth at 48 hrs  No Growth at 48 hrs     LEGIONELLA URINARY ANTIGEN   --  Negative  --    C DIFF TOXIN B BY PCR  Negative  --   --        Last 24 Hours Medication List:   Current Facility-Administered Medications   Medication Dose Route Frequency Provider Last Rate  acetaminophen  650 mg Oral Q6H PRN Vik Maple, DO      albuterol  2 5 mg Nebulization Q6H PRN Vik Map, DO      ALPRAZolam  2 mg Oral HS PRN Darlene Olman CRNP      apixaban  5 mg Oral BID [facility], DO      aspirin  81 mg Oral Daily [place], [place]      atorvastatin  80 mg Oral Daily With [place], [place]      baclofen  10 mg Oral Q8H PRN Vik [facility]      busPIRone  10 mg Oral BID [place], [place]      cefTRIAXone  1,000 mg Intravenous Q24H Vik Maple, DO 1,000 mg (06/18/22 1718)    cholecalciferol  1,000 Units Oral Daily [facility], DO      digoxin  250 mcg Oral Daily Vik Maple, [place]      fluticasone-vilanterol  1 puff Inhalation Daily Loida Beck Mcghee, [address]      gabapentin  300 mg Oral TID Vik Maple, DO      guaiFENesin  600 mg Oral [id number] [address] Maria De Jesus [place], [place]      hydrocodone-chlorpheniramine polistirex  5 mL Oral HS Melissa Jean Baptiste [place]      insulin glargine  60 Units Subcutaneous Q12H [address] Jn Morales MD      insulin lispro  2-12 Units Subcutaneous HS Jn Morales MD      insulin lispro  2-12 Units Subcutaneous TID AC Franki Simpson PA-C      insulin lispro  25 Units Subcutaneous TID With [place] Jn Morales MD      ipratropium-albuterol  3 mL Nebulization J6Z Melissa Jean Baptiste PA-C      losartan  50 mg Oral Daily [place], [place]      melatonin  3 mg Oral HS PRN MANAS Galdamez      metoprolol succinate  200 mg Oral Daily [facility], DO      nystatin   Topical BID MANAS Galdamez      pantoprazole  40 mg Oral Early Morning [facility], DO      phenol  1 spray Mouth/Throat M1M PRN Melissa Jean Baptiste PA-C      QUEtiapine  100 mg Oral QAM [place], [place]      QUEtiapine  300 mg Oral HS [place], [place]      sertraline  50 mg Oral Daily Vik [place], [place]      tamsulosin  0 4 mg Oral Daily With [address], PA-C          Today, Patient Was Seen By: Yosi Gallardo PA-C    **Please Note: This note may have been constructed using a voice recognition system  **

## 2022-06-19 NOTE — ASSESSMENT & PLAN NOTE
Wt Readings from Last 3 Encounters:   06/19/22 123 kg (270 lb 8 1 oz)   05/23/22 126 kg (277 lb)   05/20/22 126 kg (277 lb)     · Hx of HFpEF  Last ECHO 03/2022 with EF of 55%    · Continue home metoprolol and digoxin with holding parameters if BP is low  · Daily weights  · Strict I&O monitoring

## 2022-06-19 NOTE — ASSESSMENT & PLAN NOTE
· Tachypnea POA with right lower lobe infiltrate/atelectasis on CXR  · Patient was recently admitted 4/24/22 and is within 60 day window, however he still low criteria for Hcap  No history of MRSA  · Patient received IV Zosyn in ED  Received 1 dose azithromycin and was then discontinued  · Day 3 IV ceftriaxone, continue     · Urine for strep PNA and legionella antigens negative  · Continuous pulse-oximetry, wean FiO2 for goal SpO2 >88%

## 2022-06-19 NOTE — ASSESSMENT & PLAN NOTE
January 7, 2020       Malorie Crenshaw MD  [address] [facility] [address]  VIA In Basket      Patient: Justen Sanchez   YOB: 1945   Date of Visit: 1/7/2020       Dear Dr. Crenshaw:    Thank you for referring Justen Sanchez to me for evaluation. Below are my notes for this visit with him.    If you have questions, please do not hesitate to call me. I look forward to following your patient along with you.      Sincerely,        Babar Nguyen MD        CC: No Recipients  Babar Nguyen MD  1/7/2020  5:10 PM  Sign when Signing Visit  4                         [facility]    PCP: Malorie Crenshaw MD    Chief Complaint   Patient presents with   • Follow-up     3 mos. f/u        HPI  Justen Sanchez is a 74 year old male here today.  Pleasant gentleman with known atrial fibrillation.  Went through cardioversion converted back to sinus rhythm may have stayed in sinus rhythm for maybe 3 weeks.    He is maintaining good heart rate blood pressure control.  But recommendations from EP was to consider cardioversion with antiarrhythmic and patient has deferred.    Further review of systems with the son is noted that he does have snoring and apneic spells and concerned about sleep apnea as an underlying etiology for his labile hypertension and atrial fibrillation.    Patient is requesting to go back on his verapamil which he tolerated very well.  He does not want to be on the low-dose beta-blockers.    Past Medical History  Past Medical History:   Diagnosis Date   • Arthritis    • Atrial fibrillation (CMS/HCC)    • Gastritis 1/18/2017   • GERD (gastroesophageal reflux disease)    • Hyperlipidemia    • Hypertension    • Wrist fracture, left 12/4/2017       Past Surgical History  Past Surgical History:   Procedure Laterality Date   • Cardioversion     • Cataract extraction, bilateral     • Colonoscopy  2017       Family History  Family History   Problem Relation Age of Onset   • Rheumatoid  · Continue home gabapentin 300 mg t i d  Arthritis Mother    • Hypertension Mother    • Cancer, Lung Father    • Coronary Artery Disease Neg Hx         Negative for premature CAD   • Aneurysm Neg Hx          Negative for AAA.       Social History  Social History     Substance and Sexual Activity   Alcohol Use No   • Frequency: Never    Comment: denies drinking     Social History     Tobacco Use   Smoking Status Never Smoker   Smokeless Tobacco Never Used     Social History     Substance and Sexual Activity   Drug Use No       Allergies  ALLERGIES:  No Known Allergies    Presenting Medications  Current Medications    APIXABAN (ELIQUIS) 5 MG TAB    Take 1 tablet by mouth every 12 hours.    ASPIRIN 81 MG TABLET    Take 81 mg by mouth daily.    ATORVASTATIN (LIPITOR) 20 MG TABLET    TAKE 1 TABLET BY MOUTH EVERYDAY AT BEDTIME    BRIMONIDINE-TIMOLOL (COMBIGAN) 0.2-0.5 % OPHTHALMIC SOLUTION    as directed to right eye    FINASTERIDE (PROSCAR) 5 MG TABLET    Take 5 mg by mouth daily.    HYDROCHLOROTHIAZIDE (HYDRODIURIL) 25 MG TABLET    TAKE 1 TABLET BY MOUTH EVERY DAY    OMEPRAZOLE (PRILOSEC) 40 MG CAPSULE    TAKE ONE CAPSULE BY MOUTH EVERY DAY AS NEEDED    TIMOLOL (TIMOPTIC) 0.25 % OPHTHALMIC SOLUTION    INSTILL 1 DROP TWICE DAILY INTO EACH EYE    VALSARTAN (DIOVAN) 160 MG TABLET    TAKE 1 TABLET TWICE DAILY       Review of Systems  Review of Systems   Constitution: Positive for malaise/fatigue. Negative for chills, fever, weight gain and weight loss.   HENT: Negative for hearing loss.    Eyes:        Patient denies significant visual changes   Cardiovascular: Positive for dyspnea on exertion. Negative for chest pain and claudication.        Negative except for what's indicated in HPI   Respiratory: Negative for cough and hemoptysis.    Hematologic/Lymphatic: Does not bruise/bleed easily.   Skin: Negative for rash and suspicious lesions.   Musculoskeletal: Negative for arthritis.   Gastrointestinal: Negative for hematochezia and melena.   Genitourinary: Negative  for hematuria.   Neurological:        No localized deficits   Allergic/Immunologic: Negative for environmental allergies.        No new food allergies       Physical Exam  Visit Vitals  /82 (BP Location: LUE - Left upper extremity, Patient Position: Sitting, Cuff Size: Regular)   Ht 5' 10\" (1.778 m)   Wt 95.7 kg (211 lb)   BMI 30.28 kg/m²     Vital signs were reviewed today.    Physical Exam   Constitutional: He appears healthy. No distress.   Vital signs reviewed   HENT:   Mouth/Throat: Oropharynx is clear.   Eyes: Conjunctivae are normal.   Neck: Normal range of motion and thyroid normal. Neck supple.   Cardiovascular: Normal rate, S1 normal, S2 normal, normal heart sounds, intact distal pulses and normal pulses. An irregularly irregular rhythm present.   Pulmonary/Chest: Effort normal and breath sounds normal. He has no wheezes. He has no rales. He exhibits no tenderness.   Abdominal: Soft. Bowel sounds are normal. Musculoskeletal: Normal range of motion.     Neurological: He is alert and oriented to person, place, and time. He has normal motor skills. Gait normal.   Skin: Skin is warm and dry. No rash noted. No cyanosis. Nails show no clubbing.       Recent Labs  No results for input(s): CHOLESTEROL, HDL, TRIGLYCERIDE, CALCLDL, LDLDIR, NONHDL in the last 8765 hours.    No results for input(s): SODIUM, CHLORIDE, BUN, GFRA, BCRAT, POTASSIUM, C02, GLUCOSE, CREATININE, GFRNA, CALCIUM, BNP, TSH in the last 8765 hours.    Invalid input(s): AGAP, FST1     No results found for: HGBA1C.    Recent Labs   Lab 10/02/19  1035   WBC 6.8   RBC 4.80   HGB 14.7   HCT 43.5   MCV 90.6   MCHC 33.8   RDW-CV 14.4            Diagnostic Testing: Patients records were reviewed since last visit for all cardiology testing.  Labs as above    Assessment  1. Essential hypertension    2. Other chest pain    3. Atrial fibrillation, unspecified type (CMS/HCC)    4. Pure hypercholesterolemia         Justen Sanchez is a 74 year old  male atrial fibrillation of unknown duration labile hypertension shortness of breath fatigue and probable sleep apnea by review of systems with loud snoring and apneic spells.    Recommendations    Change back to verapamil of 80 mg p.o. 3 times daily.    Stop Toprol-XL 25 mg p.o. daily.    Stop amlodipine of 5 mg p.o. twice daily.    Continue current other medications.    Proceed with obstructive sleep apnea home study with follow-up with sleep clinic if abnormal.    Return to cardiology clinic in 8 weeks for blood pressure monitoring.    Babar Nguyen MD

## 2022-06-20 LAB
ANION GAP SERPL CALCULATED.3IONS-SCNC: 5 MMOL/L (ref 4–13)
BASOPHILS # BLD AUTO: 0.02 THOUSANDS/ΜL (ref 0–0.1)
BASOPHILS NFR BLD AUTO: 0 % (ref 0–1)
BUN SERPL-MCNC: 12 MG/DL (ref 5–25)
CALCIUM SERPL-MCNC: 8.4 MG/DL (ref 8.3–10.1)
CHLORIDE SERPL-SCNC: 103 MMOL/L (ref 100–108)
CO2 SERPL-SCNC: 28 MMOL/L (ref 21–32)
CREAT SERPL-MCNC: 0.72 MG/DL (ref 0.6–1.3)
EOSINOPHIL # BLD AUTO: 0.17 THOUSAND/ΜL (ref 0–0.61)
EOSINOPHIL NFR BLD AUTO: 2 % (ref 0–6)
ERYTHROCYTE [DISTWIDTH] IN BLOOD BY AUTOMATED COUNT: 13.4 % (ref 11.6–15.1)
GFR SERPL CREATININE-BSD FRML MDRD: 99 ML/MIN/1.73SQ M
GLUCOSE SERPL-MCNC: 167 MG/DL (ref 65–140)
GLUCOSE SERPL-MCNC: 185 MG/DL (ref 65–140)
GLUCOSE SERPL-MCNC: 212 MG/DL (ref 65–140)
GLUCOSE SERPL-MCNC: 216 MG/DL (ref 65–140)
GLUCOSE SERPL-MCNC: 262 MG/DL (ref 65–140)
HCT VFR BLD AUTO: 33.4 % (ref 36.5–49.3)
HGB BLD-MCNC: 11.1 G/DL (ref 12–17)
IMM GRANULOCYTES # BLD AUTO: 0.04 THOUSAND/UL (ref 0–0.2)
IMM GRANULOCYTES NFR BLD AUTO: 0 % (ref 0–2)
LYMPHOCYTES # BLD AUTO: 4.25 THOUSANDS/ΜL (ref 0.6–4.47)
LYMPHOCYTES NFR BLD AUTO: 47 % (ref 14–44)
MCH RBC QN AUTO: 30.2 PG (ref 26.8–34.3)
MCHC RBC AUTO-ENTMCNC: 33.2 G/DL (ref 31.4–37.4)
MCV RBC AUTO: 91 FL (ref 82–98)
MONOCYTES # BLD AUTO: 0.54 THOUSAND/ΜL (ref 0.17–1.22)
MONOCYTES NFR BLD AUTO: 6 % (ref 4–12)
MRSA NOSE QL CULT: ABNORMAL
MRSA NOSE QL CULT: ABNORMAL
NEUTROPHILS # BLD AUTO: 4.16 THOUSANDS/ΜL (ref 1.85–7.62)
NEUTS SEG NFR BLD AUTO: 45 % (ref 43–75)
NRBC BLD AUTO-RTO: 0 /100 WBCS
PLATELET # BLD AUTO: 144 THOUSANDS/UL (ref 149–390)
PMV BLD AUTO: 9 FL (ref 8.9–12.7)
POTASSIUM SERPL-SCNC: 4.1 MMOL/L (ref 3.5–5.3)
RBC # BLD AUTO: 3.67 MILLION/UL (ref 3.88–5.62)
SODIUM SERPL-SCNC: 136 MMOL/L (ref 136–145)
WBC # BLD AUTO: 9.18 THOUSAND/UL (ref 4.31–10.16)

## 2022-06-20 PROCEDURE — 97110 THERAPEUTIC EXERCISES: CPT

## 2022-06-20 PROCEDURE — 85025 COMPLETE CBC W/AUTO DIFF WBC: CPT | Performed by: PHYSICIAN ASSISTANT

## 2022-06-20 PROCEDURE — 94668 MNPJ CHEST WALL SBSQ: CPT

## 2022-06-20 PROCEDURE — 82948 REAGENT STRIP/BLOOD GLUCOSE: CPT

## 2022-06-20 PROCEDURE — 94660 CPAP INITIATION&MGMT: CPT

## 2022-06-20 PROCEDURE — 97167 OT EVAL HIGH COMPLEX 60 MIN: CPT

## 2022-06-20 PROCEDURE — 94640 AIRWAY INHALATION TREATMENT: CPT

## 2022-06-20 PROCEDURE — 94664 DEMO&/EVAL PT USE INHALER: CPT

## 2022-06-20 PROCEDURE — 99232 SBSQ HOSP IP/OBS MODERATE 35: CPT | Performed by: INTERNAL MEDICINE

## 2022-06-20 PROCEDURE — 97163 PT EVAL HIGH COMPLEX 45 MIN: CPT

## 2022-06-20 PROCEDURE — 94669 MECHANICAL CHEST WALL OSCILL: CPT

## 2022-06-20 PROCEDURE — 80048 BASIC METABOLIC PNL TOTAL CA: CPT | Performed by: PHYSICIAN ASSISTANT

## 2022-06-20 PROCEDURE — 94760 N-INVAS EAR/PLS OXIMETRY 1: CPT

## 2022-06-20 PROCEDURE — 99233 SBSQ HOSP IP/OBS HIGH 50: CPT | Performed by: NURSE PRACTITIONER

## 2022-06-20 RX ORDER — INSULIN GLARGINE 100 [IU]/ML
65 INJECTION, SOLUTION SUBCUTANEOUS
Status: DISCONTINUED | OUTPATIENT
Start: 2022-06-21 | End: 2022-06-21 | Stop reason: HOSPADM

## 2022-06-20 RX ORDER — INSULIN GLARGINE 100 [IU]/ML
68 INJECTION, SOLUTION SUBCUTANEOUS
Status: DISCONTINUED | OUTPATIENT
Start: 2022-06-20 | End: 2022-06-21 | Stop reason: HOSPADM

## 2022-06-20 RX ORDER — INSULIN LISPRO 100 [IU]/ML
1-6 INJECTION, SOLUTION INTRAVENOUS; SUBCUTANEOUS
Status: DISCONTINUED | OUTPATIENT
Start: 2022-06-20 | End: 2022-06-21 | Stop reason: HOSPADM

## 2022-06-20 RX ORDER — INSULIN LISPRO 100 [IU]/ML
35 INJECTION, SOLUTION INTRAVENOUS; SUBCUTANEOUS
Status: DISCONTINUED | OUTPATIENT
Start: 2022-06-21 | End: 2022-06-21 | Stop reason: HOSPADM

## 2022-06-20 RX ADMIN — HYDROCODONE POLISTIREX AND CHLORPHENIRAMINE POLISTIREX 5 ML: 10; 8 SUSPENSION, EXTENDED RELEASE ORAL at 21:22

## 2022-06-20 RX ADMIN — MUPIROCIN 1 APPLICATION: 20 OINTMENT TOPICAL at 11:45

## 2022-06-20 RX ADMIN — CEFTRIAXONE 1000 MG: 1 INJECTION, SOLUTION INTRAVENOUS at 15:43

## 2022-06-20 RX ADMIN — ALPRAZOLAM 2 MG: 0.5 TABLET ORAL at 21:23

## 2022-06-20 RX ADMIN — INSULIN GLARGINE 68 UNITS: 100 INJECTION, SOLUTION SUBCUTANEOUS at 21:18

## 2022-06-20 RX ADMIN — FLUTICASONE FUROATE AND VILANTEROL TRIFENATATE 1 PUFF: 100; 25 POWDER RESPIRATORY (INHALATION) at 08:32

## 2022-06-20 RX ADMIN — GUAIFENESIN 600 MG: 600 TABLET, EXTENDED RELEASE ORAL at 08:29

## 2022-06-20 RX ADMIN — QUETIAPINE FUMARATE 300 MG: 100 TABLET ORAL at 21:23

## 2022-06-20 RX ADMIN — TAMSULOSIN HYDROCHLORIDE 0.4 MG: 0.4 CAPSULE ORAL at 15:44

## 2022-06-20 RX ADMIN — NYSTATIN: 100000 POWDER TOPICAL at 08:30

## 2022-06-20 RX ADMIN — ALBUTEROL SULFATE 2.5 MG: 2.5 SOLUTION RESPIRATORY (INHALATION) at 05:15

## 2022-06-20 RX ADMIN — BUSPIRONE HYDROCHLORIDE 10 MG: 10 TABLET ORAL at 17:43

## 2022-06-20 RX ADMIN — SERTRALINE HYDROCHLORIDE 50 MG: 50 TABLET ORAL at 08:32

## 2022-06-20 RX ADMIN — ALBUTEROL SULFATE 2.5 MG: 2.5 SOLUTION RESPIRATORY (INHALATION) at 10:20

## 2022-06-20 RX ADMIN — GABAPENTIN 300 MG: 300 CAPSULE ORAL at 08:29

## 2022-06-20 RX ADMIN — DIGOXIN 250 MCG: 125 TABLET ORAL at 08:29

## 2022-06-20 RX ADMIN — INSULIN LISPRO 2 UNITS: 100 INJECTION, SOLUTION INTRAVENOUS; SUBCUTANEOUS at 21:18

## 2022-06-20 RX ADMIN — ACETAMINOPHEN 650 MG: 325 TABLET ORAL at 08:03

## 2022-06-20 RX ADMIN — IPRATROPIUM BROMIDE AND ALBUTEROL SULFATE 3 ML: 2.5; .5 SOLUTION RESPIRATORY (INHALATION) at 16:06

## 2022-06-20 RX ADMIN — GABAPENTIN 300 MG: 300 CAPSULE ORAL at 21:22

## 2022-06-20 RX ADMIN — Medication 1000 UNITS: at 08:32

## 2022-06-20 RX ADMIN — IPRATROPIUM BROMIDE AND ALBUTEROL SULFATE 3 ML: 2.5; .5 SOLUTION RESPIRATORY (INHALATION) at 03:00

## 2022-06-20 RX ADMIN — QUETIAPINE FUMARATE 100 MG: 50 TABLET, EXTENDED RELEASE ORAL at 08:33

## 2022-06-20 RX ADMIN — ASPIRIN 81 MG CHEWABLE TABLET 81 MG: 81 TABLET CHEWABLE at 08:31

## 2022-06-20 RX ADMIN — INSULIN LISPRO 6 UNITS: 100 INJECTION, SOLUTION INTRAVENOUS; SUBCUTANEOUS at 16:23

## 2022-06-20 RX ADMIN — INSULIN LISPRO 4 UNITS: 100 INJECTION, SOLUTION INTRAVENOUS; SUBCUTANEOUS at 11:43

## 2022-06-20 RX ADMIN — GUAIFENESIN 600 MG: 600 TABLET, EXTENDED RELEASE ORAL at 21:22

## 2022-06-20 RX ADMIN — PANTOPRAZOLE SODIUM 40 MG: 40 TABLET, DELAYED RELEASE ORAL at 05:01

## 2022-06-20 RX ADMIN — INSULIN LISPRO 30 UNITS: 100 INJECTION, SOLUTION INTRAVENOUS; SUBCUTANEOUS at 16:23

## 2022-06-20 RX ADMIN — INSULIN LISPRO 30 UNITS: 100 INJECTION, SOLUTION INTRAVENOUS; SUBCUTANEOUS at 08:30

## 2022-06-20 RX ADMIN — MUPIROCIN 1 APPLICATION: 20 OINTMENT TOPICAL at 21:21

## 2022-06-20 RX ADMIN — NYSTATIN: 100000 POWDER TOPICAL at 17:43

## 2022-06-20 RX ADMIN — IPRATROPIUM BROMIDE AND ALBUTEROL SULFATE 3 ML: 2.5; .5 SOLUTION RESPIRATORY (INHALATION) at 19:49

## 2022-06-20 RX ADMIN — IPRATROPIUM BROMIDE AND ALBUTEROL SULFATE 3 ML: 2.5; .5 SOLUTION RESPIRATORY (INHALATION) at 07:46

## 2022-06-20 RX ADMIN — METOPROLOL SUCCINATE 200 MG: 100 TABLET, EXTENDED RELEASE ORAL at 08:31

## 2022-06-20 RX ADMIN — INSULIN LISPRO 2 UNITS: 100 INJECTION, SOLUTION INTRAVENOUS; SUBCUTANEOUS at 07:37

## 2022-06-20 RX ADMIN — INSULIN LISPRO 30 UNITS: 100 INJECTION, SOLUTION INTRAVENOUS; SUBCUTANEOUS at 11:48

## 2022-06-20 RX ADMIN — LOSARTAN POTASSIUM 50 MG: 50 TABLET, FILM COATED ORAL at 08:31

## 2022-06-20 RX ADMIN — APIXABAN 5 MG: 5 TABLET, FILM COATED ORAL at 17:43

## 2022-06-20 RX ADMIN — BUSPIRONE HYDROCHLORIDE 10 MG: 10 TABLET ORAL at 08:31

## 2022-06-20 RX ADMIN — GABAPENTIN 300 MG: 300 CAPSULE ORAL at 15:44

## 2022-06-20 RX ADMIN — APIXABAN 5 MG: 5 TABLET, FILM COATED ORAL at 08:32

## 2022-06-20 RX ADMIN — ATORVASTATIN CALCIUM 80 MG: 40 TABLET, FILM COATED ORAL at 15:44

## 2022-06-20 RX ADMIN — IPRATROPIUM BROMIDE AND ALBUTEROL SULFATE 3 ML: 2.5; .5 SOLUTION RESPIRATORY (INHALATION) at 23:42

## 2022-06-20 RX ADMIN — INSULIN GLARGINE 65 UNITS: 100 INJECTION, SOLUTION SUBCUTANEOUS at 08:29

## 2022-06-20 NOTE — ASSESSMENT & PLAN NOTE
Patient with a history of COPD with chronic respiratory failure currently using 2-3L of O2, titrates up to 4-6L of O2 with exertion at baseline  On admission day, patient states that he had been using 4 L of oxygen in the past few days due to shortness of breath  Mild tachypnea to low 20's POA in setting of metabolic acidosis with mild DKA  CXR with R  base opacification suspicious for infiltrate vs  Atelectasis  · Started on azithromycin and ceftriaxone  See pneumonia plan below  · Substituted Trelergy for Breo  · Patient at his baseline O2 requirements of 2-3 L today  · SpO2 has been stable on nasal cannula  Using CPAP 12/5 40% HS     · Titrate O2 for goal SpO2 >88%

## 2022-06-20 NOTE — ASSESSMENT & PLAN NOTE
Wt Readings from Last 3 Encounters:   06/20/22 123 kg (270 lb 4 5 oz)   05/23/22 126 kg (277 lb)   05/20/22 126 kg (277 lb)     · Hx of HFpEF  Last ECHO 03/2022 with EF of 55%    · Continue home metoprolol and digoxin with holding parameters if BP is low  · Daily weights  · Strict I&O monitoring

## 2022-06-20 NOTE — OCCUPATIONAL THERAPY NOTE
Occupational Therapy Evaluation/Treatment       06/20/22 1131   Note Type   Note type Evaluation   Restrictions/Precautions   Other Precautions Chair Alarm; Bed Alarm; Fall Risk   Pain Assessment   Pain Assessment Tool 0-10   Pain Score No Pain   Home Living   Type of Home Apartment   Home Layout One level;Elevator  (2nd floor with elevator)   Bathroom Shower/Tub Walk-in shower   [address] [place] [address] chair   Home Equipment Electric scooter;Walker   Prior Function   Level of [place] Independent with ADLs and functional mobility   Lives With Alone   ADL Assistance Independent   IADLs Independent   Comments Patient admitted with SOB and diarrhea x 1 day  Patient found to have pneumonia and diabetic ketacidosis     ADL   Eating Assistance 5  Supervision/Setup   Grooming Assistance 5  Supervision/Setup   UB Bathing Assistance 4  Minimal Assistance   LB Bathing Assistance 3  Moderate Assistance   UB Dressing Assistance 4  Minimal Assistance   LB Dressing Assistance 3  Moderate Assistance   Toileting Assistance  4  Minimal Assistance   Bed Mobility   Supine to Sit 3  Moderate assistance   Additional items Assist x 1   Transfers   Sit to Stand 4  Minimal assistance   Additional items Assist x 1   Stand to Sit 4  Minimal assistance   Additional items Assist x 1   Functional Mobility   Functional Mobility 4  Minimal assistance   Additional Comments 5 feet bed to chair with RW   Additional items Rolling walker   Balance   Static Sitting Fair   Dynamic Sitting Fair -   Static Standing Fair -   Dynamic Standing Fair -   Activity Tolerance   Activity Tolerance Patient limited by fatigue   Nurse Made Aware yes Laura   RUE Assessment   RUE Assessment   (elbow/grup WFL, Shoulder flexion AROM 40 degrees )   LUE Assessment   LUE Assessment   (elbow/grup WFL, Shoulder flexion AROM 50 degrees )   Cognition   Overall Cognitive Status WFL   Arousal/Participation Cooperative   Attention Within functional limits   Orientation Level Oriented X4   Following Commands Follows all commands and directions without difficulty   Assessment   Limitation Decreased ADL status; Decreased UE strength;Decreased Safe judgement during ADL;Decreased endurance;Decreased self-care trans;Decreased high-level ADLs  (decreased balance and mobility)   Prognosis Good   Assessment Patient evaluated by Occupational Therapy  Patient admitted with Diabetic ketoacidosis without coma associated with type 2 diabetes mellitus ([facility] [address] )  The patients occupational profile, medical and therapy history includes a extensive additional review of physical, cognitive, or psychosocial history related to current functional performance  Comorbidities affecting functional mobility and ADLS include: Afib, Bipolar disorder, CHF, COPD, diabetes and hypertension  Prior to admission, patient was independent with functional mobility with walker, independent with ADLS and independent with IADLS  The evaluation identifies the following performance deficits: weakness, decreased ROM, impaired balance, decreased endurance, increased fall risk, new onset of impairment of functional mobility, decreased ADLS, decreased IADLS, decreased activity tolerance, decreased safety awareness, impaired judgement and decreased strength, that result in activity limitations and/or participation restrictions  This evaluation requires clinical decision making of high complexity, because the patient presents with comorbidites that affect occupational performance and required significant modification of tasks or assistance with consideration of multiple treatment options  The Barthel Index was used as a functional outcome tool presenting with a score of Barthel Index Score: 40, indicating marked limitations of functional mobility and ADLS  The patient's raw score on the -PAC Daily Activity inpatient short form is 17, standardized score is 37 26, less than 39 4   Patients at this level are likely to benefit from DC to post-acute rehabilitation services  Please refer to the recommendation of the Occupational Therapist for safe DC planning  Patient will benefit from skilled Occupational Therapy services to address above deficits and facilitate a safe return to prior level of function  Goals   Patient Goals to get stronger and go to rehab   STG Time Frame   (1-7 days)   Short Term Goal  Goals established to promote Patient Goals: to get stronger and go to rehab:  Patient will increase standing tolerance to 3 minutes during ADL task to decrease assistance level and decrease fall risk; Patient will increase bed mobility to min assist in preparation for ADLS and transfers; Patient will increase functional mobility to and from bathroom with rolling walker with min assist to increase performance with ADLS and to use a toilet; Patient will tolerate 10 minutes of UE ROM/strengthening to increase general activity tolerance and performance in ADLS/IADLS; Patient will improve functional activity tolerance to 10 minutes of sustained functional tasks to increase participation in basic self-care and decrease assistance level;  Patient will be able to to verbalize understanding and perform energy conservation/proper body mechanics during ADLS and functional mobility at least 75% of the time with minimal cueing to decrease signs of fatigue and increase stamina to return to prior level of function; Patient will increase dynamic sitting balance to fair to improve the ability to sit at edge of bed or on a chair for ADLS;  Patient will increase dynamic standing balance to fair to improve postural stability and decrease fall risk during standing ADLS and transfers  LTG Time Frame   (8-14 days)   Long Term Goal Patient will increase standing tolerance to 6 minutes during ADL task to decrease assistance level and decrease fall risk; Patient will increase bed mobility to supervision in preparation for ADLS and transfers;  Patient will increase functional mobility to and from bathroom with rolling walker with supervision to increase performance with ADLS and to use a toilet; Patient will tolerate 20 minutes of UE ROM/strengthening to increase general activity tolerance and performance in ADLS/IADLS; Patient will improve functional activity tolerance to 20 minutes of sustained functional tasks to increase participation in basic self-care and decrease assistance level;  Patient will be able to to verbalize understanding and perform energy conservation/proper body mechanics during ADLS and functional mobility at least 90% of the time to decrease signs of fatigue and increase stamina to return to prior level of function; Patient will increase dynamic sitting balance to fair+ to improve the ability to sit at edge of bed or on a chair for ADLS;  Patient will increase dynamic standing balance to fair+ to improve postural stability and decrease fall risk during standing ADLS and transfers  Pt will score >/= 21/24 on AM-PAC Daily Activity Inpatient scale to promote safe independence with ADLs and functional mobility; Pt will score >/= 70/100 on Barthel Index in order to decrease caregiver assistance needed and increase ability to perform ADLs and functional mobility  Functional Transfer Goals   Pt Will Perform All Functional Transfers   (STG supervision LTG Independent)   ADL Goals   Pt Will Perform Eating   (STG independent)   Pt Will Perform Grooming   (STG independent)   Pt Will Perform Bathing   (STG min assist LTG supervision)   Pt Will Perform UE Dressing   (STG supervision LTG Independent)   Pt Will Perform LE Dressing   (STG min assist LTG supervision)   Pt Will Perform Toileting   (STG supervision LTG Independent)   Plan   Treatment Interventions ADL retraining;Functional transfer training;UE strengthening/ROM; Endurance training;Patient/family training;Equipment evaluation/education; Activityengagement; Compensatory technique education   Goal Expiration Date 07/04/22   OT Frequency 3-5x/wk   Additional Treatment Session   Start Time 1120   End Time 1130   Treatment Assessment S: denies pain c/o fatigue O: Completed BUE AROM 5 reps B shoulder flexion, elbow flexion 5x2 reps,  B x 10 and R wrist flexion x 10  A: Patient is generally weak and deconditioned  Patient is fatigued with activity with poor activity tolerance  Patient is generally weak and deconditioned    P: STR   Recommendation   OT Discharge Recommendation Post acute rehabilitation services   AM-PAC Daily Activity Inpatient   Lower Body Dressing 2   Bathing 2   Toileting 3   Upper Body Dressing 3   Grooming 3   Eating 4   Daily Activity Raw Score 17   Daily Activity Standardized Score (Calc for Raw Score >=11) 37 26   AM-PAC Applied Cognition Inpatient   Following a Speech/Presentation 4   Understanding Ordinary Conversation 4   Taking Medications 4   Remembering Where Things Are Placed or Put Away 4   Remembering List of 4-5 Errands 4   Taking Care of Complicated Tasks 4   Applied Cognition Raw Score 24   Applied Cognition Standardized Score 62 21   Barthel Index   Feeding 10   Bathing 0   Grooming Score 0   Dressing Score 5   Bladder Score 0   Bowels Score 10   Toilet Use Score 5   Transfers (Bed/Chair) Score 10   Mobility (Level Surface) Score 0   Stairs Score 0   Barthel Index Score 40   Licensure   [place] License Number  Richard Davis [address] 87 OTR/L [id number]
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
## 2022-06-20 NOTE — PHYSICAL THERAPY NOTE
PHYSICAL THERAPY EVALUATION/TREATMENT     06/20/22 1420   Note Type   Note type Evaluation   Pain Assessment   Pain Assessment Tool Mon-Baker FACES   Mon-Baker FACES Pain Rating 0  (Patient states controlling his chronic low back pain with a new appointment that he obtained through [employer])   Restrictions/Precautions   Other Precautions Chair Alarm; Bed Alarm; Fall Risk;O2;Pain   Home Living   Type of [address] One level;Elevator   [address] [place] [address] chair   Home Equipment Electric scooter;Walker;Cane   Additional Comments Patient reports using mostly cane in his home although utilizing a electric scooter at times depending on fatigue  And using electric scooter outside of his home mostly all prior to admission   Prior Function   Level of [place] Independent with ADLs and functional mobility   Lives With Alone   Receives Help From Friend(s); Neighbor   ADL Assistance Independent   IADLs Independent   Comments Patient states managing ADLs and cooking on his own prior to admission although patient also states having applied for Medicaid in hoping to get a home health aide and mom's meals when insurance is in place   General   Additional Pertinent History Chart reviewed, patient admitted with diabetic ketoacidosis    Patient now presents as generally weak and deconditioned and will benefit from continued therapy services   Family/Caregiver Present No   Cognition   Overall Cognitive Status WFL   Arousal/Participation Cooperative   Attention Within functional limits   Orientation Level Oriented X4   Following Commands Follows all commands and directions without difficulty   Subjective   Subjective Patient states wanting to be able to go home and have a home health aide 5 days a week with "mom's meals" being delivered   RLE Assessment   RLE Assessment   (ROM WFL, strength 3+ /4-)   LLE Assessment   LLE Assessment   (ROM WFL, strength 3+/ 4-)   Coordination   Movements are Fluid and Coordinated 0   Bed Mobility   Additional Comments Unable to assess out of bed at this time due to patient stating having just gotten back to bed from sitting in the chair most of the day  Transfers and gait had been assist with occupational therapy earlier in the day and was documented as Min assist with a roller walker for short distances   Balance   Static Sitting Fair   Dynamic Sitting [address] -   Activity Tolerance   Activity Tolerance Patient limited by fatigue;Treatment limited secondary to medical complications (Comment)  (Seen in ICU)   Nurse Made Aware yes   Assessment   Prognosis Good   Problem List Decreased strength;Decreased range of motion;Decreased endurance; Impaired balance;Decreased mobility; Decreased coordination;Pain   Assessment Patient seen for Physical Therapy evaluation  Patient admitted with Diabetic ketoacidosis without coma associated with type 2 diabetes mellitus ([facility] )  Comorbidities affecting patient's physical performance include: Afib, Bipolar disorder, CHF, COPD, diabetes and hypertension     Personal factors affecting patient at time of initial evaluation include: ambulating with assistive device, inability to ambulate household distances, inability to navigate community distances, inability to navigate level surfaces without external assistance, inability to perform dynamic tasks in community, limited home support, positive fall history, inability to perform physical activity, inability to perform ADLS and inability to perform IADLS    Prior to admission, patient was independent with functional mobility with cane, independent with ADLS, independent with IADLS, ambulating household distance and home with family assist   Please find objective findings from Physical Therapy assessment regarding body systems outlined above with impairments and limitations including weakness, decreased ROM, impaired balance, decreased endurance, impaired coordination, gait deviations, pain, decreased activity tolerance, decreased functional mobility tolerance and fall risk  The Barthel Index was used as a functional outcome tool presenting with a score of Barthel Index Score: 40 today indicating marked limitations of functional mobility and ADLS  Patient's clinical presentation is currently unstable/unpredictable as seen in patient's presentation of vital sign response, changing level of pain, increased fall risk, new onset of impairment of functional mobility, decreased endurance and new onset of weakness  Pt would benefit from continued Physical Therapy treatment to address deficits as defined above and maximize level of functional mobility  As demonstrated by objective findings, the assigned level of complexity for this evaluation is high  The patient's AM-[facility] Basic Mobility Inpatient Short Form Raw Score is 15  A Raw score of less than or equal to 16 suggests the patient may benefit from discharge to post-acute rehabilitation services although patient would be able to return home with home health aide, home PT and meals being delivered  Please also refer to the recommendation of the Physical Therapist for safe discharge planning  Goals   Patient Goals To get stronger and go home with home health aide, home PT and meals being delivered   STG Expiration Date 06/27/22   Short Term Goal #1 Transfers and gait with roller walker with supervision   Short Term Goal #2 Gait endurance to functional household distances   LTG Expiration Date 07/04/22   Long Term Goal #1 Strength bilateral lower extremities 4/5   Long Term Goal #2 Independent transfers and gait with cane   Plan   Treatment/Interventions ADL retraining;Functional transfer training;LE strengthening/ROM; Therapeutic exercise; Endurance training;Patient/family training;Equipment eval/education; Bed mobility;Gait training; Compensatory technique education   PT Frequency Other (Comment)  (5 times per week)   Recommendation   PT Discharge Recommendation Home with home health rehabilitation   Additional Comments Patient states having applied for Medicaid last week and would like to return home with home health services, home PT and meals being delivered   Ignacio 8 in Bed Without Bedrails 3   Lying on Back to Sitting on Edge of Flat Bed 2   Moving Bed to Chair 3   Standing Up From Chair 3   Walk in Room 3   Climb 3-5 Stairs 1   Basic Mobility Inpatient Raw Score 15   Basic Mobility Standardized Score 36 97   Highest Level Of Mobility   -HL Goal 4: Move to chair/commode   JH-HLM Achieved 4: Move to chair/commode   Barthel Index   Feeding 10   Bathing 0   Grooming Score 0   Dressing Score 5   Bladder Score 0   Bowels Score 10   Toilet Use Score 5   Transfers (Bed/Chair) Score 10   Mobility (Level Surface) Score 0   Stairs Score 0   Barthel Index Score 40   Additional Treatment Session   Start Time 1405   End Time 1420   Treatment Assessment S:  Patient states I will be sore after these exercises and referring to his LS area O: Bilateral lower extremity exercise completed in bedside a:  Patient will benefit from assessment of transfers and gait with progression as tolerated with PT   Exercises   Hamstring Stretch Supine;5 reps;PROM; Bilateral   Hamstring Sets Supine;5 reps;Bilateral   Quad Sets Supine;5 reps;Bilateral   Heelslides Supine;10 reps;Bilateral   Glute Sets Supine;5 reps   Hip Abduction Supine;10 reps;Bilateral   Knee AROM Short Arc Quad Supine;10 reps;Bilateral   Ankle Pumps Supine;10 reps;Bilateral   Bridging Supine;5 reps   Licensure   [place] License Number  Sarah Mack [id number]

## 2022-06-20 NOTE — ASSESSMENT & PLAN NOTE
Severe Sepsis, POA, due to pneumonia as evidenced by WBC > 12 00 (21 41) and HR > 90, with lactate > 2 0 (2 8, 2 5); requiring IV Zosyn in the ED, IV Rocephin, blood cultures x 2, and lactic acid levels    · Azithromycin discontinued, continue IV ceftriaxone (Day 4 today)  · Patient with diarrhea which is improving, one loose BM since yesterday - C diff negative   · BC x2: NGTD  · Follow blood cultures, WBC and temperature curve

## 2022-06-20 NOTE — ASSESSMENT & PLAN NOTE
Lab Results   Component Value Date    HGBA1C 8 6 (H) 03/25/2022       Recent Labs     06/19/22  0627 06/19/22  1123 06/19/22  1543 06/19/22 2115   POCGLU 183* 282* 303* 167*     Blood Sugar Average: Last 72 hrs:  (P) 240   · See DKA plan above

## 2022-06-20 NOTE — ASSESSMENT & PLAN NOTE
Tachypnea POA with right lower lobe infiltrate/atelectasis on CXR  Patient was recently admitted 4/24/22 and is within 60 day window, however he still low criteria for Hcap - MRSA positive but improving with rocephin  Patient received IV Zosyn in ED  Received 1 dose azithromycin and was then discontinued  · Day 4 IV ceftriaxone, continue     · Urine for strep PNA and legionella antigens negative  · Continuous pulse-oximetry, wean FiO2 for goal SpO2 >88%

## 2022-06-20 NOTE — ASSESSMENT & PLAN NOTE
· Tachycardic to low 100's on admission  · EKG showed Atrial fibrillation with no ST changes  · Continue home digoxin, metoprolol and Eliquis

## 2022-06-20 NOTE — ASSESSMENT & PLAN NOTE
Lab Results   Component Value Date    HGBA1C 8 6 (H) 03/25/2022       Recent Labs     06/19/22  0627 06/19/22  1123 06/19/22  1543 06/19/22  2115   POCGLU 183* 282* 303* 167*       Blood Sugar Average: Last 72 hrs:  (P) 240   · DKA POA as evidenced by glucose >600, Beta hydroxybutyrate 2 7, anion gap 14, pH 7 4, bicarb 20 6  Patient presented with SOB and diarrhea past few days, reports compliance with insulin but not blood glucose checks at home  · Home regimen: metformin, Victoza, 75 units of glargine b i d  and 35 units of NovoLog t i d  · Last A1c was 05/23/2022 at 8 6  · Patient started on insulin drip and received 6L IV fluids per DKA protocol, admitted to level on step-down under Critical Care Service, titrated off insulin drip and resumed scheduled insulin 6/18  Anion gap closed since 06/17    · Patient started on Lantus 60 units b i d  and NovoLog 20 units t i d  with SSI coverage  · Blood glucose today overall stable 180-280s, will adjust insulin regimen as indicated  · Hypoglycemia protocol  · Carb controlled diet  · Endocrinology consult pending

## 2022-06-21 VITALS
RESPIRATION RATE: 20 BRPM | TEMPERATURE: 97.3 F | OXYGEN SATURATION: 91 % | DIASTOLIC BLOOD PRESSURE: 60 MMHG | SYSTOLIC BLOOD PRESSURE: 114 MMHG | HEART RATE: 95 BPM | BODY MASS INDEX: 38.15 KG/M2 | HEIGHT: 71 IN | WEIGHT: 272.49 LBS

## 2022-06-21 LAB
ALBUMIN SERPL BCP-MCNC: 2.3 G/DL (ref 3.5–5)
ALP SERPL-CCNC: 63 U/L (ref 46–116)
ALT SERPL W P-5'-P-CCNC: 37 U/L (ref 12–78)
ANION GAP SERPL CALCULATED.3IONS-SCNC: 9 MMOL/L (ref 4–13)
AST SERPL W P-5'-P-CCNC: 29 U/L (ref 5–45)
BASOPHILS # BLD AUTO: 0.03 THOUSANDS/ΜL (ref 0–0.1)
BASOPHILS NFR BLD AUTO: 0 % (ref 0–1)
BILIRUB SERPL-MCNC: 0.37 MG/DL (ref 0.2–1)
BUN SERPL-MCNC: 11 MG/DL (ref 5–25)
CALCIUM ALBUM COR SERPL-MCNC: 9.7 MG/DL (ref 8.3–10.1)
CALCIUM SERPL-MCNC: 8.3 MG/DL (ref 8.3–10.1)
CHLORIDE SERPL-SCNC: 100 MMOL/L (ref 100–108)
CO2 SERPL-SCNC: 28 MMOL/L (ref 21–32)
CREAT SERPL-MCNC: 0.7 MG/DL (ref 0.6–1.3)
EOSINOPHIL # BLD AUTO: 0.21 THOUSAND/ΜL (ref 0–0.61)
EOSINOPHIL NFR BLD AUTO: 2 % (ref 0–6)
ERYTHROCYTE [DISTWIDTH] IN BLOOD BY AUTOMATED COUNT: 13.3 % (ref 11.6–15.1)
GFR SERPL CREATININE-BSD FRML MDRD: 101 ML/MIN/1.73SQ M
GLUCOSE SERPL-MCNC: 130 MG/DL (ref 65–140)
GLUCOSE SERPL-MCNC: 144 MG/DL (ref 65–140)
GLUCOSE SERPL-MCNC: 177 MG/DL (ref 65–140)
GLUCOSE SERPL-MCNC: 186 MG/DL (ref 65–140)
HCT VFR BLD AUTO: 32.6 % (ref 36.5–49.3)
HGB BLD-MCNC: 10.7 G/DL (ref 12–17)
IMM GRANULOCYTES # BLD AUTO: 0.06 THOUSAND/UL (ref 0–0.2)
IMM GRANULOCYTES NFR BLD AUTO: 1 % (ref 0–2)
LYMPHOCYTES # BLD AUTO: 5.04 THOUSANDS/ΜL (ref 0.6–4.47)
LYMPHOCYTES NFR BLD AUTO: 49 % (ref 14–44)
MCH RBC QN AUTO: 30 PG (ref 26.8–34.3)
MCHC RBC AUTO-ENTMCNC: 32.8 G/DL (ref 31.4–37.4)
MCV RBC AUTO: 91 FL (ref 82–98)
MONOCYTES # BLD AUTO: 0.56 THOUSAND/ΜL (ref 0.17–1.22)
MONOCYTES NFR BLD AUTO: 5 % (ref 4–12)
NEUTROPHILS # BLD AUTO: 4.48 THOUSANDS/ΜL (ref 1.85–7.62)
NEUTS SEG NFR BLD AUTO: 43 % (ref 43–75)
NRBC BLD AUTO-RTO: 0 /100 WBCS
PLATELET # BLD AUTO: 173 THOUSANDS/UL (ref 149–390)
PMV BLD AUTO: 9 FL (ref 8.9–12.7)
POTASSIUM SERPL-SCNC: 3.9 MMOL/L (ref 3.5–5.3)
PROT SERPL-MCNC: 5.8 G/DL (ref 6.4–8.2)
RBC # BLD AUTO: 3.57 MILLION/UL (ref 3.88–5.62)
SODIUM SERPL-SCNC: 137 MMOL/L (ref 136–145)
WBC # BLD AUTO: 10.38 THOUSAND/UL (ref 4.31–10.16)

## 2022-06-21 PROCEDURE — 94640 AIRWAY INHALATION TREATMENT: CPT

## 2022-06-21 PROCEDURE — 85025 COMPLETE CBC W/AUTO DIFF WBC: CPT | Performed by: NURSE PRACTITIONER

## 2022-06-21 PROCEDURE — 94669 MECHANICAL CHEST WALL OSCILL: CPT

## 2022-06-21 PROCEDURE — 99238 HOSP IP/OBS DSCHRG MGMT 30/<: CPT | Performed by: NURSE PRACTITIONER

## 2022-06-21 PROCEDURE — 82948 REAGENT STRIP/BLOOD GLUCOSE: CPT

## 2022-06-21 PROCEDURE — 80053 COMPREHEN METABOLIC PANEL: CPT | Performed by: NURSE PRACTITIONER

## 2022-06-21 PROCEDURE — 94760 N-INVAS EAR/PLS OXIMETRY 1: CPT

## 2022-06-21 PROCEDURE — 94668 MNPJ CHEST WALL SBSQ: CPT

## 2022-06-21 RX ORDER — CEFDINIR 300 MG/1
300 CAPSULE ORAL EVERY 12 HOURS SCHEDULED
Qty: 6 CAPSULE | Refills: 0 | Status: SHIPPED | OUTPATIENT
Start: 2022-06-21 | End: 2022-06-24

## 2022-06-21 RX ORDER — FLUTICASONE FUROATE AND VILANTEROL 100; 25 UG/1; UG/1
1 POWDER RESPIRATORY (INHALATION) DAILY
Qty: 60 BLISTER | Refills: 0 | Status: SHIPPED | OUTPATIENT
Start: 2022-06-22

## 2022-06-21 RX ORDER — INSULIN GLARGINE 100 [IU]/ML
INJECTION, SOLUTION SUBCUTANEOUS
Qty: 105 ML | Refills: 0 | Status: SHIPPED | OUTPATIENT
Start: 2022-06-21

## 2022-06-21 RX ORDER — INSULIN ASPART 100 [IU]/ML
INJECTION, SOLUTION INTRAVENOUS; SUBCUTANEOUS
Qty: 30 ML | Refills: 0
Start: 2022-06-21

## 2022-06-21 RX ADMIN — INSULIN LISPRO 35 UNITS: 100 INJECTION, SOLUTION INTRAVENOUS; SUBCUTANEOUS at 08:18

## 2022-06-21 RX ADMIN — NYSTATIN 1 APPLICATION: 100000 POWDER TOPICAL at 08:33

## 2022-06-21 RX ADMIN — IPRATROPIUM BROMIDE AND ALBUTEROL SULFATE 3 ML: 2.5; .5 SOLUTION RESPIRATORY (INHALATION) at 12:55

## 2022-06-21 RX ADMIN — METOPROLOL SUCCINATE 200 MG: 100 TABLET, EXTENDED RELEASE ORAL at 08:31

## 2022-06-21 RX ADMIN — IPRATROPIUM BROMIDE AND ALBUTEROL SULFATE 3 ML: 2.5; .5 SOLUTION RESPIRATORY (INHALATION) at 03:15

## 2022-06-21 RX ADMIN — BUSPIRONE HYDROCHLORIDE 10 MG: 10 TABLET ORAL at 08:28

## 2022-06-21 RX ADMIN — LOSARTAN POTASSIUM 50 MG: 50 TABLET, FILM COATED ORAL at 08:30

## 2022-06-21 RX ADMIN — INSULIN GLARGINE 65 UNITS: 100 INJECTION, SOLUTION SUBCUTANEOUS at 06:51

## 2022-06-21 RX ADMIN — FLUTICASONE FUROATE AND VILANTEROL TRIFENATATE 1 PUFF: 100; 25 POWDER RESPIRATORY (INHALATION) at 08:30

## 2022-06-21 RX ADMIN — APIXABAN 5 MG: 5 TABLET, FILM COATED ORAL at 08:28

## 2022-06-21 RX ADMIN — Medication 1000 UNITS: at 08:29

## 2022-06-21 RX ADMIN — GABAPENTIN 300 MG: 300 CAPSULE ORAL at 08:30

## 2022-06-21 RX ADMIN — QUETIAPINE FUMARATE 100 MG: 50 TABLET, EXTENDED RELEASE ORAL at 08:32

## 2022-06-21 RX ADMIN — GUAIFENESIN 600 MG: 600 TABLET, EXTENDED RELEASE ORAL at 08:30

## 2022-06-21 RX ADMIN — ASPIRIN 81 MG CHEWABLE TABLET 81 MG: 81 TABLET CHEWABLE at 08:28

## 2022-06-21 RX ADMIN — SERTRALINE HYDROCHLORIDE 50 MG: 50 TABLET ORAL at 08:33

## 2022-06-21 RX ADMIN — INSULIN LISPRO 1 UNITS: 100 INJECTION, SOLUTION INTRAVENOUS; SUBCUTANEOUS at 06:39

## 2022-06-21 RX ADMIN — DIGOXIN 250 MCG: 125 TABLET ORAL at 08:29

## 2022-06-21 RX ADMIN — IPRATROPIUM BROMIDE AND ALBUTEROL SULFATE 3 ML: 2.5; .5 SOLUTION RESPIRATORY (INHALATION) at 07:37

## 2022-06-21 RX ADMIN — MUPIROCIN 1 APPLICATION: 20 OINTMENT TOPICAL at 08:31

## 2022-06-21 RX ADMIN — INSULIN LISPRO 35 UNITS: 100 INJECTION, SOLUTION INTRAVENOUS; SUBCUTANEOUS at 11:50

## 2022-06-21 RX ADMIN — PANTOPRAZOLE SODIUM 40 MG: 40 TABLET, DELAYED RELEASE ORAL at 06:40

## 2022-06-21 NOTE — CASE MANAGEMENT
Case Management Discharge Planning Note    Patient name Gunnar Alcantar  Location ICU 09/ICU 09 MRN [id number]  : 1960 Date 2022       Current Admission Date: 2022  Current Admission Diagnosis:Diabetic ketoacidosis without coma associated with type 2 diabetes mellitus [facility])   Patient Active Problem List    Diagnosis Date Noted    Right lower lobe pneumonia 2022    Chronic diastolic CHF     Chronic pain syndrome 2022    Foraminal stenosis of lumbar region 2022    Lumbar post-laminectomy syndrome 2022    Lumbar radiculopathy 2022    Shortness of breath 2022    SIRS (systemic inflammatory response syndrome) ([address] ) 2022    Dysuria 2021    Peripheral neuropathy 2021    Chronic left-sided low back pain with left-sided sciatica 2021    BMI 40 0-44 9, adult ([address] ) 2021    Muscle weakness (generalized) 2021    Platelets decreased ([address] ) 2021    Medicare annual wellness visit, subsequent 2021    Chronic congestive heart failure ([address] ) 2020    Severe sepsis ([address] ) 10/29/2020    Hyperlipidemia 10/29/2020    Nutritional anemia, unspecified  10/29/2020    Chest pain 10/11/2020    Simple chronic bronchitis ([address] ) 10/11/2020    Atrial fibrillation 2020    Hyperglycemia 2020    Transition of care performed with sharing of clinical summary 2020    Obstructive sleep apnea 2020    Obesity (BMI 30-39 9) 2020    Stage 2 chronic kidney disease 2020    Hyponatremia 2020    COPD (chronic obstructive pulmonary disease) ([address] ) 2020    Chronic a-fib ([address] ) 2020    H/O vitamin D deficiency 10/28/2019    Dyslipidemia 2019    Type 2 diabetes mellitus with complication, with long-term current use of insulin ([address] ) 2019    Restrictive ventilatory defect 2019    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 ([facility] ) 03/25/2019    Lung disease, restrictive 11/21/2018    Diabetic ketoacidosis without coma associated with type 2 diabetes mellitus ([place] [address] ) 11/16/2018    Chronic respiratory failure with hypoxia ([place] [address] ) 10/31/2018    Coronary artery disease 09/06/2017    Esophageal reflux 09/06/2017    ANA LILIA (acute kidney injury) ([place] [address] ) 03/20/2017    Back pain 11/30/2016    SHAVONNE and COPD overlap syndrome      Morbid obesity      Insulin-dependent diabetes mellitus with neuropathy 09/02/2016    Fatigue 09/02/2016    GERD 06/08/2016    Cough 01/13/2016    COPD with exacerbation ([place] [address] ) 01/13/2016    Psychiatric disorder     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, [address] [place] [address]  Phone: [phone] Fax: [phone]    Primary Care Provider: Shira Rajan MD    Primary Insurance: MEDICARE  Secondary Insurance: [id number]    DISCHARGE DETAILS:    Discharge planning discussed with[de-identified] Patient  Freedom of Choice: Yes     CM contacted family/caregiver?: No- see comments (Declined)     Additional Comments: KENDAL s/w patient bedside regarding his questions regarding insurance coverage  KENDAL explained that per chart review patient appears to have MEDICARE primary and [place] secondary  Patient provided with contact information for Medicare and [facility] [address] regarding questions regarding specific coverage information  Patient stating that he has no other questions or concerns at this time and has already received a call from [employer] regarding [facility] services  IMM reviewed with patient, patient agrees with discharge determination  RN aware of pending transport - will TT with update/confirmed time when known  CM will continue to follow  independent

## 2022-06-21 NOTE — CASE MANAGEMENT
Case Management Assessment & Discharge Planning Note    Patient name Murali Prieto  Location ICU 09/ICU 09 MRN [id number]  : 1960 Date 2022       Current Admission Date: 2022  Current Admission Diagnosis:Diabetic ketoacidosis without coma associated with type 2 diabetes mellitus [facility])   Patient Active Problem List    Diagnosis Date Noted    Right lower lobe pneumonia 2022    Chronic diastolic CHF     Chronic pain syndrome 2022    Foraminal stenosis of lumbar region 2022    Lumbar post-laminectomy syndrome 2022    Lumbar radiculopathy 2022    Shortness of breath 2022    SIRS (systemic inflammatory response syndrome) ([address] ) 2022    Dysuria 2021    Peripheral neuropathy 2021    Chronic left-sided low back pain with left-sided sciatica 2021    BMI 40 0-44 9, adult ([address] ) 2021    Muscle weakness (generalized) 2021    Platelets decreased ([address] ) 2021    Medicare annual wellness visit, subsequent 2021    Chronic congestive heart failure ([address] ) 2020    Severe sepsis ([address] ) 10/29/2020    Hyperlipidemia 10/29/2020    Nutritional anemia, unspecified  10/29/2020    Chest pain 10/11/2020    Simple chronic bronchitis ([address] ) 10/11/2020    Atrial fibrillation 2020    Hyperglycemia 2020    Transition of care performed with sharing of clinical summary 2020    Obstructive sleep apnea 2020    Obesity (BMI 30-39 9) 2020    Stage 2 chronic kidney disease 2020    Hyponatremia 2020    COPD (chronic obstructive pulmonary disease) ([address] ) 2020    Chronic a-fib ([address] ) 2020    H/O vitamin D deficiency 10/28/2019    Dyslipidemia 2019    Type 2 diabetes mellitus with complication, with long-term current use of insulin ([address] ) 2019    Restrictive ventilatory defect 2019    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 [facility]) 03/25/2019    Lung disease, restrictive 11/21/2018    Diabetic ketoacidosis without coma associated with type 2 diabetes mellitus ([place] [address] ) 11/16/2018    Chronic respiratory failure with hypoxia ([place] [address] ) 10/31/2018    Coronary artery disease 09/06/2017    Esophageal reflux 09/06/2017    ANA LILIA (acute kidney injury) ([place] [address] ) 03/20/2017    Back pain 11/30/2016    SHAVONNE and COPD overlap syndrome      Morbid obesity      Insulin-dependent diabetes mellitus with neuropathy 09/02/2016    Fatigue 09/02/2016    GERD 06/08/2016    Cough 01/13/2016    COPD with exacerbation ([place] [address] ) 01/13/2016    Psychiatric disorder     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/25/2015    Erectile dysfunction of organic origin 08/25/2014    Essential hypertension 09/04/2012    Diabetic neuropathy ([place] [address] ) 09/04/2012    Panic disorder without agoraphobia 09/04/2012    Vitamin D deficiency 09/04/2012      LOS (days): 4  Geometric Mean LOS (GMLOS) (days): 4 80  Days to GMLOS:0 9     OBJECTIVE:    Risk of Unplanned Readmission Score: 44 95         Current admission status: Inpatient       Preferred Pharmacy:   [place] [id number], [address] [place] [address]  Phone: [phone] Fax: [phone]    Primary Care Provider: Delmis Boo MD    Primary Insurance: MEDICARE  Secondary Insurance: [id number]    ASSESSMENT:  [address]     [address]   Primary Phone: [phone] (Mobile)            Readmission Root Cause  30 Day Readmission: No    Patient Information  Admitted from[de-identified] Home  Mental Status: Alert  During Assessment patient was accompanied by: Not accompanied during assessment  Assessment information provided by[de-identified] Patient  Primary Caregiver: Self  Support Systems: Self, [address] of Residence: [address] [place] [address] do you live in?: [address] [place] [address] access options   Select all that apply : Elevator  Type of Current Residence: Apartment  Floor Level: 2  Upon entering residence, is there a bedroom on the main floor (no further steps)?: Yes  Upon entering residence, is there a bathroom on the main floor (no further steps)?: Yes  In the last 12 months, was there a time when you were not able to pay the mortgage or rent on time?: No  In the last 12 months, how many places have you lived?: 1  In the last 12 months, was there a time when you did not have a steady place to sleep or slept in a shelter (including now)?: No  Homeless/housing insecurity resource given?: No  Living Arrangements: Lives Alone  Is patient a ?: No    Activities of Daily Living Prior to Admission  Functional Status: Independent  Completes ADLs independently?: Yes  Ambulates independently?: Yes  Does patient use assisted devices?: Yes  Assisted Devices (DME) used: Walker, Home Oxygen concentrator, Portable Oxygen tanks, Electric wheelchair, Straight [employer] Name (respiratory supplies):  [employer]  Does patient currently own DME?: Yes  What DME does the patient currently own?: Electric Wheelchair, Home Oxygen concentrator, Straight Cane, Walker, Portable Oxygen tanks  Does patient have a history of Outpatient Therapy (PT/OT)?: Yes  Does the patient have a history of Short-Term Rehab?: Yes (@ [facility] and [facility])  Does patient have a history of HHC?: Yes (@ [place] [facility])  Does patient currently have [address] ?: No    Patient Information Continued  Income Source: SSI/SSD  Does patient have prescription coverage?: Yes  Within the past 12 months, you worried that your food would run out before you got the money to buy more : Never true  Within the past 12 months, the food you bought just didn't last and you didn't have money to get more : Never true  Food insecurity resource given?: No  Does patient receive dialysis treatments?: No  Does patient have a history of substance abuse?: No  Does patient have a history of Mental Health Diagnosis?: No    PHQ 2/9 Screening   Reviewed PHQ 2/9 Depression Screening Score?: No    Means of Transportation  Means of Transport to Appts[de-identified] Christopher Danika  In the past 12 months, has lack of transportation kept you from medical appointments or from getting medications?: No  In the past 12 months, has lack of transportation kept you from meetings, work, or from getting things needed for daily living?: No  Was application for public transport provided?: No    DISCHARGE DETAILS:    Discharge planning discussed with[de-identified] Patient  Freedom of Choice: Yes  Comments - Freedom of Choice: Patient chose is to D/C home with VNA  Preferred agency is [facility]  CM contacted family/caregiver?: No- see comments (Declined)  Were Treatment Team discharge recommendations reviewed with patient/caregiver?: Yes  Did patient/caregiver verbalize understanding of patient care needs?: Yes  Were patient/caregiver advised of the risks associated with not following Treatment Team discharge recommendations?: Yes    Requested [address]         Is the patient interested in [facility] at discharge?: Yes  [address] requested[de-identified] Nursing, Occupational Therapy, Physical [address] Name[de-identified] [address] [facility] Provider[de-identified] PCP  Home Health Services Needed[de-identified] COPD Management, Diabetes Management, Evaluate Functional Status and Safety, Gait/ADL Training, Strengthening/Theraputic Exercises to Improve Function  Homebound Criteria Met[de-identified] Requires the Assistance of Another Person for Safe Ambulation or to Leave the Home, Uses an Assist Device (i e  cane, walker, etc)  Supporting Clincal Findings[de-identified] Fatigues Easliy in [employer], Limited Endurance    DME Referral Provided  Referral made for DME?: No    Other Referral/Resources/Interventions Provided:  Interventions: [facility]  Referral Comments: Referral opened to [facility]  Referral pending review at this time      Would you like to participate in our [address] service program?  : No - Declined    Treatment Team Recommendation: Home with [address] [place] [address] (PT rec for VNA, OT rec for STR )  Discharge Destination Plan[de-identified] Home with [address] [facility] (Patient choice is to D/C home with VNA )

## 2022-06-21 NOTE — NURSING NOTE
Discharge plan of care reviewed with patient by Clair Iverson  Case management  Discharge transport present  [facility] To transport patient by wheelchair to home  Discharge instructions, medications, and follow up reviewed with patient  Printed copy of discharge summary given to patient  Patient states understanding of all discharge instructions including accu checks and insulin coverage  He had no further questions  Per patient VNA has already reached out to him regarding home care  Patient dressed belonging check list completed   Patient discharged to home via wheelchair Lars Cottrell "[facility]"

## 2022-06-21 NOTE — ASSESSMENT & PLAN NOTE
Tachypnea POA with right lower lobe infiltrate/atelectasis on CXR  Patient was recently admitted 4/24/22 and is within 60 day window, however he still low criteria for Hcap - MRSA positive but improving with rocephin  Patient received IV Zosyn in ED  Received 1 dose azithromycin and was then discontinued    · Day 4 IV ceftriaxone, continue - transition to cefdinir for 7 days total on discharge  · Urine for strep PNA and legionella antigens negative  · Continuous pulse-oximetry, wean FiO2 for goal SpO2 >88%

## 2022-06-21 NOTE — ASSESSMENT & PLAN NOTE
Lab Results   Component Value Date    HGBA1C 8 6 (H) 03/25/2022       Recent Labs     06/20/22  1534 06/20/22  2117 06/21/22  0156 06/21/22  0638   POCGLU 262* 212* 144* 186*     Blood Sugar Average: Last 72 hrs:  (P) 433 [id number]   · See DKA plan above

## 2022-06-21 NOTE — CASE MANAGEMENT
Case Management Discharge Planning Note    Patient name Hailey Schneider  Location ICU 09/ICU 09 MRN [id number]  : 1960 Date 2022       Current Admission Date: 2022  Current Admission Diagnosis:Diabetic ketoacidosis without coma associated with type 2 diabetes mellitus [facility])   Patient Active Problem List    Diagnosis Date Noted    Right lower lobe pneumonia 2022    Chronic diastolic CHF     Chronic pain syndrome 2022    Foraminal stenosis of lumbar region 2022    Lumbar post-laminectomy syndrome 2022    Lumbar radiculopathy 2022    Shortness of breath 2022    SIRS (systemic inflammatory response syndrome) ([facility] [address] ) 2022    Dysuria 2021    Peripheral neuropathy 2021    Chronic left-sided low back pain with left-sided sciatica 2021    BMI 40 0-44 9, adult ([address] ) 2021    Muscle weakness (generalized) 2021    Platelets decreased ([address] ) 2021    Medicare annual wellness visit, subsequent 2021    Chronic congestive heart failure ([address] ) 2020    Severe sepsis ([address] ) 10/29/2020    Hyperlipidemia 10/29/2020    Nutritional anemia, unspecified  10/29/2020    Chest pain 10/11/2020    Simple chronic bronchitis ([address] ) 10/11/2020    Atrial fibrillation 2020    Hyperglycemia 2020    Transition of care performed with sharing of clinical summary 2020    Obstructive sleep apnea 2020    Obesity (BMI 30-39 9) 2020    Stage 2 chronic kidney disease 2020    Hyponatremia 2020    COPD (chronic obstructive pulmonary disease) ([address] ) 2020    Chronic a-fib ([address] ) 2020    H/O vitamin D deficiency 10/28/2019    Dyslipidemia 2019    Type 2 diabetes mellitus with complication, with long-term current use of insulin ([address] ) 2019    Restrictive ventilatory defect 2019    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 ([place] [address] ) 03/25/2019    Lung disease, restrictive 11/21/2018    Diabetic ketoacidosis without coma associated with type 2 diabetes mellitus ([place] [address] ) 11/16/2018    Chronic respiratory failure with hypoxia ([place] [address] ) 10/31/2018    Coronary artery disease 09/06/2017    Esophageal reflux 09/06/2017    ANA LILIA (acute kidney injury) ([place] [address] ) 03/20/2017    Back pain 11/30/2016    SHAVONNE and COPD overlap syndrome      Morbid obesity      Insulin-dependent diabetes mellitus with neuropathy 09/02/2016    Fatigue 09/02/2016    GERD 06/08/2016    Cough 01/13/2016    COPD with exacerbation ([place] [address] ) 01/13/2016    Psychiatric disorder     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/25/2015    Erectile dysfunction of organic origin 08/25/2014    Essential hypertension 09/04/2012    Diabetic neuropathy ([place] [address] ) 09/04/2012    Panic disorder without agoraphobia 09/04/2012    Vitamin D deficiency 09/04/2012      LOS (days): 4  Geometric Mean LOS (GMLOS) (days): 4 80  Days to GMLOS:0 8     OBJECTIVE:  Risk of Unplanned Readmission Score: 44 95         Current admission status: Inpatient   Preferred Pharmacy:   [facility], [address] [place] [id number]  Phone: [phone] Fax: [phone]    Primary Care Provider: Bakari Jenkins MD    Primary Insurance: MEDICARE  Secondary Insurance: [id number]    DISCHARGE DETAILS:    Discharge planning discussed with[de-identified] Patient  Freedom of Choice: Yes  Comments - Freedom of Choice: Choice is to D/C home with [facility]      Requested [address]         Is the patient interested in [facility] at discharge?: Yes  [address] requested[de-identified] Nursing, Occupational Therapy, Physical [address] Name[de-identified] [address] Provider[de-identified] PCP  Home Health Services Needed[de-identified] COPD Management, Diabetes Management, Evaluate Functional Status and Safety, Gait/ADL Training, Strengthening/Theraputic Exercises to Improve Function  Homebound Criteria Met[de-identified] Requires the Assistance of Another Person for Safe Ambulation or to Leave the Home, Uses an Assist Device (i e  cane, walker, etc)  Supporting Clincal Findings[de-identified] Limited Endurance, Fatigues Easliy in [employer]    DME Referral Provided  Referral made for DME?: No    Other Referral/Resources/Interventions Provided:  Interventions: [facility]  Referral Comments: [facility] confirmed able to accept  AVS updated      Treatment Team Recommendation: Home with [address] [place] [address] (PT rec for VNA, OT rec for STR )  Discharge Destination Plan[de-identified] [facility] (Patient choice is to D/C home with VNA )

## 2022-06-21 NOTE — ASSESSMENT & PLAN NOTE
Lab Results   Component Value Date    HGBA1C 8 6 (H) 03/25/2022       Recent Labs     06/20/22  1534 06/20/22  2117 06/21/22  0156 06/21/22  0638   POCGLU 262* 212* 144* 186*       Blood Sugar Average: Last 72 hrs:  (P) 206 [id number]   · DKA POA as evidenced by glucose >600, Beta hydroxybutyrate 2 7, anion gap 14, pH 7 4, bicarb 20 6  Patient presented with SOB and diarrhea past few days, reports compliance with insulin but not blood glucose checks at home  · Home regimen: metformin, Victoza, 75 units of glargine b i d  and 35 units of NovoLog t i d  · Last A1c was 05/23/2022 at 8 6  · Patient started on insulin drip and received 6L IV fluids per DKA protocol, admitted to level on step-down under Critical Care Service, titrated off insulin drip and resumed scheduled insulin 6/18  Anion gap closed since 06/17    · Blood glucose today overall stable 180-280s, will adjust insulin regimen as indicated  · Hypoglycemia protocol  · Carb controlled diet  · Endocrinology consult appreciated  · Lantus 65 units in the AM, 68 units QHS  · Humalog 35 units TID with meals

## 2022-06-21 NOTE — ASSESSMENT & PLAN NOTE
Wt Readings from Last 3 Encounters:   06/21/22 124 kg (272 lb 7 8 oz)   05/23/22 126 kg (277 lb)   05/20/22 126 kg (277 lb)     · Hx of HFpEF  Last ECHO 03/2022 with EF of 55%    · Continue home metoprolol and digoxin with holding parameters if BP is low  · Daily weights  · Strict I&O monitoring

## 2022-06-21 NOTE — DISCHARGE SUMMARY
Phan 44  Discharge- [facility] 1960, 57 y o  male MRN: [id number]  Unit/Bed#: ICU 09 Encounter: [id number]  Primary Care Provider: Goyo Jimenes MD   Date and time admitted to hospital: 6/17/2022  7:23 AM    Right lower lobe pneumonia  Assessment & Plan  Tachypnea POA with right lower lobe infiltrate/atelectasis on CXR  Patient was recently admitted 4/24/22 and is within 60 day window, however he still low criteria for Hcap - MRSA positive but improving with rocephin  Patient received IV Zosyn in ED  Received 1 dose azithromycin and was then discontinued  · Day 4 IV ceftriaxone, continue - transition to cefdinir for 7 days total on discharge  · Urine for strep PNA and legionella antigens negative  · Continuous pulse-oximetry, wean FiO2 for goal SpO2 >88%    Severe sepsis (HCC)  Assessment & Plan  Severe Sepsis, POA, due to pneumonia as evidenced by WBC > 12 00 (21 41) and HR > 90, with lactate > 2 0 (2 8, 2 5); requiring IV Zosyn in the ED, IV Rocephin, blood cultures x 2, and lactic acid levels    · Azithromycin discontinued, continue IV ceftriaxone (Day 4 today)  · Patient with diarrhea which is improving, one loose BM since yesterday - C diff negative   · BC x2: NGTD  · Follow blood cultures, WBC and temperature curve    Insulin-dependent diabetes mellitus with neuropathy  Assessment & Plan  Lab Results   Component Value Date    HGBA1C 8 6 (H) 03/25/2022       Recent Labs     06/20/22  1534 06/20/22  2117 06/21/22  0156 06/21/22  0638   POCGLU 262* 212* 144* 186*     Blood Sugar Average: Last 72 hrs:  (P) 885 [id number]   · See DKA plan above      * Diabetic ketoacidosis without coma associated with type 2 diabetes mellitus [facility])  Assessment & Plan  Lab Results   Component Value Date    HGBA1C 8 6 (H) 03/25/2022       Recent Labs     06/20/22  1534 06/20/22  2117 06/21/22  0156 06/21/22  0638   POCGLU 262* 212* 144* 186*       Blood Sugar Average: Last 72 hrs:  (P) 076 [id number]   · DKA POA as evidenced by glucose >600, Beta hydroxybutyrate 2 7, anion gap 14, pH 7 4, bicarb 20 6  Patient presented with SOB and diarrhea past few days, reports compliance with insulin but not blood glucose checks at home  · Home regimen: metformin, Victoza, 75 units of glargine b i d  and 35 units of NovoLog t i d  · Last A1c was 05/23/2022 at 8 6  · Patient started on insulin drip and received 6L IV fluids per DKA protocol, admitted to level on step-down under Critical Care Service, titrated off insulin drip and resumed scheduled insulin 6/18  Anion gap closed since 06/17    · Blood glucose today overall stable 180-280s, will adjust insulin regimen as indicated  · Hypoglycemia protocol  · Carb controlled diet  · Endocrinology consult appreciated  · Lantus 65 units in the AM, 68 units QHS  · Humalog 35 units TID with meals      Discharging Physician / Practitioner: Anna Rashid 10 Lars Coughlin  PCP: Mireille Kendrick MD  Admission Date: 6/17/2022  Discharge Date: 06/21/22    Reason for Admission: Shortness of Breath (Pt reports of worsening sob for a couple of days with diarrhea started yest)        Medical Problems             Resolved Problems  Date Reviewed: 6/21/2022   None                 Consultations During Hospital Stay:  IP CONSULT TO CASE MANAGEMENT  IP CONSULT TO ENDOCRINOLOGY    Procedures Performed:     · none    Significant Findings / Test Results:     · As below   Results from last 7 days   Lab Units 06/21/22  0523 06/20/22  0456 06/19/22  0549   WBC Thousand/uL 10 38* 9 18 11 76*   HEMOGLOBIN g/dL 10 7* 11 1* 12 3   PLATELETS Thousands/uL 173 144* 156     Results from last 7 days   Lab Units 06/21/22  0523 06/20/22  0456 06/19/22  0549 06/18/22  0600 06/17/22  0955 06/17/22  0752   SODIUM mmol/L 137 136 136 137   < > 126*   POTASSIUM mmol/L 3 9 4 1 3 5 4 0   < > 4 2   CHLORIDE mmol/L 100 103 98* 102   < > 85*   CO2 mmol/L 28 28 31 30   < > 27   BUN mg/dL 11 12 14 17   < > 31*   CREATININE mg/dL 0 70 0 72 0 78 0 84   < > 1 33*   CALCIUM mg/dL 8 3 8 4 8 4 8 4   < > 9 9   TOTAL BILIRUBIN mg/dL 0 37  --   --  0 68  --  1 36*   ALK PHOS U/L 63  --   --  51  --  86   ALT U/L 37  --   --  19  --  25   AST U/L 29  --   --  15  --  12    < > = values in this interval not displayed  Results from last 7 days   Lab Units 06/17/22  0752   INR  1 05         Lab Results   Component Value Date/Time    HGBA1C 8 6 (H) 03/25/2022 06:12 AM    HGBA1C 8 0 07/21/2017 09:27 AM     Results from last 7 days   Lab Units 06/21/22  0638 06/21/22  0156 06/20/22  2117 06/20/22  1534 06/20/22  1138 06/19/22  2115 06/19/22  1543 06/19/22  1123 06/19/22  0627 06/18/22  2134 06/18/22  1554 06/18/22  1249   POC GLUCOSE mg/dl 186* 144* 212* 262* 216* 167* 303* 282* 183* 270* 268* 163*     Results from last 7 days   Lab Units 06/18/22  0600 06/17/22  1152 06/17/22  0955 06/17/22  0752   LACTIC ACID mmol/L  --  2 0 2 5* 2 8*   PROCALCITONIN ng/ml 0 64*  --   --   --      Blood Culture:   Lab Results   Component Value Date    BLOODCX No Growth at 72 hrs  06/17/2022    BLOODCX No Growth at 72 hrs  06/17/2022    BLOODCX No Growth After 5 Days  04/25/2022    BLOODCX No Growth After 5 Days  04/25/2022    BLOODCX No Growth After 5 Days  02/06/2020    BLOODCX No Growth After 5 Days  02/06/2020     Urine Culture:   Lab Results   Component Value Date    URINECX 1612-4573 cfu/ml Mixed Contaminants X2 03/20/2017    URINECX No Growth <1000 cfu/mL 11/27/2016    URINECX No Growth <1000 cfu/mL 09/02/2016     Sputum Culture: No components found for: SPUTUMCX  Wound Culture: No results found for: WOUNDCULT     XR chest 1 view portable   Final Result by Ravindra Loza MD (06/17 5980)      Development of right basal opacity suspicious for infiltrate/atelectasis                  Workstation performed: [id number]                Incidental Findings:   none    Test Results Pending at Discharge (will require follow up):    None Outpatient Tests Requested:  None     Complications:  None     Reason for Admission:   Chief Complaint   Patient presents with    Shortness of Breath     Pt reports of worsening sob for a couple of days with diarrhea started yest       Hospital Course:     Per HPI: Judy Benitez is a 57 y o  male patient with a PMH of COPD, CAD, atrial fibrillation, hypertension, insulin-dependent diabetes who originally presented to the hospital on 6/17/2022 due to shortness of breath and diarrhea  On arrival to the emergency department patient was found to have the right lower lobe pneumonia  He is typically oxygen dependent but had increasing oxygen requirements  He was started on Rocephin and given a dose of azithromycin  Rocephin was continued throughout his hospitalization  Versus arms positives who started on Bactrim  Clinically he improved with Rocephin so he is transitioned to cefdinir as an outpatient  He did require short course of ICU stay secondary to diabetic ketoacidosis  The patient had been noncompliant with taking his blood sugars at home, anion gap was elevated as was beta hydroxybutyrate  He was treated with IV insulin infusion as well as additional IV fluids with closure of gap  Endocrinology consultation was appreciated  His blood sugars were better controlled during hospitalization  On the day of discharge case was reviewed with Endocrinology for recommendations regarding home regimen which were continued  He will follow-up with his PCP and endocrinology within the week  Hospital Course:    Please see above list of diagnoses and related plan for additional information  Condition at Discharge: stable       Discharge Day Visit / Exam:     Subjective:  Patient seen sitting up in chair  He had crocker removed yesterday and is voiding with no difficulty  He reports feeling better and is ready to go home      Vitals: Blood Pressure: 137/76 (06/21/22 0800)  Pulse: 92 (06/21/22 0800)  Temperature: 98 2 °F (36 8 °C) (06/21/22 0800)  Temp Source: Temporal (06/21/22 0800)  Respirations: 20 (06/21/22 0800)  Height: 5' 11" (180 3 cm) (06/17/22 1245)  Weight - Scale: 124 kg (272 lb 7 8 oz) (06/21/22 0600)  SpO2: 96 % (06/21/22 0800)  Exam:   Physical Exam  Vitals and nursing note reviewed  Constitutional:       Appearance: Normal appearance  Interventions: Nasal cannula in place  HENT:      Head: Normocephalic  Nose: Nose normal    Eyes:      Extraocular Movements: Extraocular movements intact  Cardiovascular:      Rate and Rhythm: Normal rate  Pulmonary:      Effort: Pulmonary effort is normal       Breath sounds: Normal breath sounds  Abdominal:      General: Abdomen is flat  Bowel sounds are normal  There is no distension  Palpations: Abdomen is soft  Tenderness: There is no abdominal tenderness  Musculoskeletal:         General: Normal range of motion  Skin:     General: Skin is warm and dry  Neurological:      General: No focal deficit present  Mental Status: He is alert and oriented to person, place, and time  Psychiatric:         Mood and Affect: Mood normal          Behavior: Behavior normal            Discharge instructions/Information to patient and family:   See after visit summary for information provided to patient and family  Provisions for Follow-Up Care:  See after visit summary for information related to follow-up care and any pertinent home health orders  Disposition:     Home    Planned Readmission: none     Discharge Statement:  I spent 25 minutes discharging the patient  This time was spent on the day of discharge  I had direct contact with the patient on the day of discharge  Greater than 50% of the total time was spent examining patient, answering all patient questions, arranging and discussing plan of care with patient as well as directly providing post-discharge instructions    Additional time then spent on discharge activities  Discharge Medications:  See after visit summary for reconciled discharge medications provided to patient and family        ** Please Note: This note has been constructed using a voice recognition system **

## 2022-06-22 LAB
BACTERIA BLD CULT: NORMAL
BACTERIA BLD CULT: NORMAL

## 2022-06-28 ENCOUNTER — TRANSITIONAL CARE MANAGEMENT (OUTPATIENT)
Dept: FAMILY MEDICINE CLINIC | Facility: CLINIC | Age: 62
End: 2022-06-28

## 2022-06-28 PROBLEM — C91.10 CLL (CHRONIC LYMPHOCYTIC LEUKEMIA) (HCC): Status: ACTIVE | Noted: 2022-06-28
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
## 2022-06-28 NOTE — PROGRESS NOTES
Virtual TCM Visit:    Verification of patient location:    Patient is located in the following state in which I hold an active license { amb virtual patient location:[address]}        Assessment/Plan:        Problem List Items Addressed This Visit    None            Reason for visit is ***    Encounter provider Zoila Lau MD       Provider located at [address] [place] [phone]      Recent Visits  No visits were found meeting these conditions  Showing recent visits within past 7 days and meeting all other requirements  Future Appointments  No visits were found meeting these conditions  Showing future appointments within next 150 days and meeting all other requirements       After connecting through [employer], the patient was identified by name and date of birth  Edi Pulliam was informed that this is a telemedicine visit and that the visit is being conducted through {[facility]}  {Telemedicine confidentiality :22442} {Telemedicine participants:35730}  He acknowledged consent and understanding of privacy and security of the video platform  The patient has agreed to participate and understands they can discontinue the visit at any time  Patient is aware this is a billable service  Subjective:     Patient ID: Edi Pulliam is a 57 y o  male  HPI    Review of Systems      Objective:    Vitals:    05/20/22 1033   Weight: 126 kg (277 lb)   Height: 5' 11" (1 803 m)       Physical Exam        Transitional Care Management Review:  Edi Pulliam is a 57 y o  male here for TCM follow up       During the TCM phone call patient stated:    TCM Call (since 5/28/2022)     Date and time call was made  6/28/2022  9:32 AM    Hospital care reviewed  Records reviewed    Patient was hospitialized at  [address] [place] [address]    Date of Admission  06/17/22    Date of discharge  06/21/22    Diagnosis  diabetic ketoacidosis    Disposition Home    Were the patients medications reviewed and updated  Yes      TCM Call (since 5/28/2022)     I have advised the patient to call PCP with any new or worsening symptoms  Marlys holt/cma 6/27/22    Living Arrangements  Alone          I spent 25 minutes with the patient during this visit  Mireille Kendrick MD      VIRTUAL VISIT DISCLAIMER    Ernestina Duverney verbally agrees to participate in [employer]  Pt is aware that [employer] could be limited without vital signs or the ability to perform a full hands-on physical exam  Alonzo Dixon understands he or the provider may request at any time to terminate the video visit and request the patient to seek care or treatment in person

## 2022-06-29 ENCOUNTER — TELEPHONE (OUTPATIENT)
Dept: FAMILY MEDICINE CLINIC | Facility: CLINIC | Age: 62
End: 2022-06-29

## 2022-06-29 NOTE — TELEPHONE ENCOUNTER
Pt declined social work through [facility] due to loss of medicaid    He is waiting for it to be renewed

## 2022-06-30 NOTE — TELEPHONE ENCOUNTER
Intake received  Pt has medicare A & B  Pt also has active [employer] education not needed at this time

## 2022-07-05 DIAGNOSIS — E78.5 DYSLIPIDEMIA: ICD-10-CM

## 2022-07-05 RX ORDER — ICOSAPENT ETHYL 1000 MG/1
2 CAPSULE ORAL 2 TIMES DAILY
Qty: 360 CAPSULE | Refills: 3 | Status: SHIPPED | OUTPATIENT
Start: 2022-07-05 | End: 2023-08-14

## 2022-07-06 DIAGNOSIS — Z79.4 TYPE 2 DIABETES MELLITUS WITH COMPLICATION, WITH LONG-TERM CURRENT USE OF INSULIN (HCC): ICD-10-CM

## 2022-07-06 DIAGNOSIS — E11.8 TYPE 2 DIABETES MELLITUS WITH COMPLICATION, WITH LONG-TERM CURRENT USE OF INSULIN (HCC): ICD-10-CM

## 2022-07-06 RX ORDER — LIRAGLUTIDE 6 MG/ML
INJECTION SUBCUTANEOUS
Qty: 9 ML | Refills: 1 | Status: SHIPPED | OUTPATIENT
Start: 2022-07-06 | End: 2022-08-30

## 2022-07-07 ENCOUNTER — TELEPHONE (OUTPATIENT)
Dept: FAMILY MEDICINE CLINIC | Facility: CLINIC | Age: 62
End: 2022-07-07

## 2022-07-07 NOTE — TELEPHONE ENCOUNTER
Primo RIVASA called stating that the pt is having increased lower back pain and tylenol and pain gel is not helping  Pt stated he ordered a muscle relaxer off [address] advised him not to take that medication unless prescribed by his Dr however he said he did research and he thinks it will be fine  Pts /57 and his hr is 100  Pt instructed to go to ER by [facility] if the pain worsens

## 2022-07-11 ENCOUNTER — TELEPHONE (OUTPATIENT)
Dept: HEMATOLOGY ONCOLOGY | Facility: MEDICAL CENTER | Age: 62
End: 2022-07-11

## 2022-07-11 ENCOUNTER — TELEPHONE (OUTPATIENT)
Dept: CARDIOLOGY CLINIC | Facility: CLINIC | Age: 62
End: 2022-07-11

## 2022-07-11 ENCOUNTER — TELEPHONE (OUTPATIENT)
Dept: FAMILY MEDICINE CLINIC | Facility: CLINIC | Age: 62
End: 2022-07-11

## 2022-07-11 NOTE — TELEPHONE ENCOUNTER
Tima Leon from telehealth medicine calling to advise pt is c/o of severe back pain which is elevating BP  BP today 196/95 HR 90 low 100  Pt was advised to go to ER with those numbers  Pt is refusing, per Tima Leon has already reached out to pain management along with PCP   Making cardiology aware     C/B Tima Leon at [phone]  Or [phone]

## 2022-07-11 NOTE — TELEPHONE ENCOUNTER
Patients bp is now 142/70 , His pt was at his home , he placed a cool wet rag on his head and wrists and bp has been coming down he still has back pain but has an appointment with Dr Dav Sprague next week tc/cma

## 2022-07-11 NOTE — TELEPHONE ENCOUNTER
Please find out ow pt is doing and  if pt has spoken with his pain mgt doctor and cardiologist about current sxs/vitals-

## 2022-07-11 NOTE — TELEPHONE ENCOUNTER
Patient was mailed a [employer], the office has not received it back and Harley Pappas has an appt on 7/13/22  A call was placed to [facility], he decided not to use [facility] at this time  He will be using a local taxi service  [facility] notified by email

## 2022-07-11 NOTE — TELEPHONE ENCOUNTER
Tennille Escobar called back to say pt bp was 199/95 this afternoon tc/cmaJeanette [phone] called from [facility] ,yahir is having severe lower back pain , bp was 203/110, heart rate 102-110  patient was advised over the weekend to go to care now or the ER and he declined    She asked we call and check on him today , he was going to recheck bp in an hour tc/cma

## 2022-07-12 ENCOUNTER — TELEPHONE (OUTPATIENT)
Dept: CARDIOLOGY CLINIC | Facility: CLINIC | Age: 62
End: 2022-07-12

## 2022-07-13 ENCOUNTER — OFFICE VISIT (OUTPATIENT)
Dept: HEMATOLOGY ONCOLOGY | Facility: MEDICAL CENTER | Age: 62
End: 2022-07-13
Payer: MEDICARE

## 2022-07-13 VITALS
SYSTOLIC BLOOD PRESSURE: 130 MMHG | HEART RATE: 98 BPM | TEMPERATURE: 98.3 F | RESPIRATION RATE: 16 BRPM | OXYGEN SATURATION: 99 % | HEIGHT: 71 IN | BODY MASS INDEX: 35.14 KG/M2 | WEIGHT: 251 LBS | DIASTOLIC BLOOD PRESSURE: 92 MMHG

## 2022-07-13 DIAGNOSIS — C91.10 CLL (CHRONIC LYMPHOCYTIC LEUKEMIA) (HCC): Primary | ICD-10-CM

## 2022-07-13 PROCEDURE — 99214 OFFICE O/P EST MOD 30 MIN: CPT | Performed by: INTERNAL MEDICINE
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
## 2022-07-14 ENCOUNTER — TELEPHONE (OUTPATIENT)
Dept: CARDIOLOGY CLINIC | Facility: CLINIC | Age: 62
End: 2022-07-14

## 2022-07-15 ENCOUNTER — TELEMEDICINE (OUTPATIENT)
Dept: FAMILY MEDICINE CLINIC | Facility: CLINIC | Age: 62
End: 2022-07-15
Payer: MEDICARE

## 2022-07-15 VITALS
DIASTOLIC BLOOD PRESSURE: 72 MMHG | SYSTOLIC BLOOD PRESSURE: 140 MMHG | WEIGHT: 243 LBS | BODY MASS INDEX: 33.89 KG/M2 | HEART RATE: 98 BPM | OXYGEN SATURATION: 99 %

## 2022-07-15 DIAGNOSIS — E78.5 DYSLIPIDEMIA: Primary | ICD-10-CM

## 2022-07-15 DIAGNOSIS — E11.8 TYPE 2 DIABETES MELLITUS WITH COMPLICATION, WITH LONG-TERM CURRENT USE OF INSULIN (HCC): ICD-10-CM

## 2022-07-15 DIAGNOSIS — Z79.4 TYPE 2 DIABETES MELLITUS WITH COMPLICATION, WITH LONG-TERM CURRENT USE OF INSULIN (HCC): ICD-10-CM

## 2022-07-15 DIAGNOSIS — Z12.5 PROSTATE CANCER SCREENING: ICD-10-CM

## 2022-07-15 DIAGNOSIS — E66.01 MORBID OBESITY (HCC): ICD-10-CM

## 2022-07-15 DIAGNOSIS — I10 BENIGN ESSENTIAL HYPERTENSION: ICD-10-CM

## 2022-07-15 PROCEDURE — 99213 OFFICE O/P EST LOW 20 MIN: CPT | Performed by: FAMILY MEDICINE

## 2022-07-18 ENCOUNTER — TELEPHONE (OUTPATIENT)
Dept: PAIN MEDICINE | Facility: CLINIC | Age: 62
End: 2022-07-18

## 2022-07-18 ENCOUNTER — TELEPHONE (OUTPATIENT)
Dept: FAMILY MEDICINE CLINIC | Facility: CLINIC | Age: 62
End: 2022-07-18

## 2022-07-18 NOTE — TELEPHONE ENCOUNTER
Pt called asking for a LYFT for his 7/21 appmt- [facility]       [phone]

## 2022-07-18 NOTE — TELEPHONE ENCOUNTER
Visiting nurse derrick called to report pt is still holding torsmide yesterdays weight was 248 5 and today was 251 5lbs  pt is not complaining of symptoms  States throughout the week pt weight was stable and not symptomatic  He still has weight loss of 16lbs in 2 weeks though   They just want you to be aware

## 2022-07-18 NOTE — TELEPHONE ENCOUNTER
Dr Victoria Boeck called regarding forms faxed last week for continuous glucose monitoring device  Please complete and fax back to [phone]

## 2022-07-19 DIAGNOSIS — K21.9 GASTROESOPHAGEAL REFLUX DISEASE: ICD-10-CM

## 2022-07-19 RX ORDER — OMEPRAZOLE 20 MG/1
CAPSULE, DELAYED RELEASE ORAL
Qty: 30 CAPSULE | Refills: 1 | Status: ON HOLD | OUTPATIENT
Start: 2022-07-19

## 2022-07-20 ENCOUNTER — TELEPHONE (OUTPATIENT)
Dept: CARDIOLOGY CLINIC | Facility: CLINIC | Age: 62
End: 2022-07-20

## 2022-07-20 NOTE — TELEPHONE ENCOUNTER
Patient is back home and visiting nurse called to report : has gained 71/2 lbs in total for   The week  He had stop taking his torsemide  He resumed taking the medication yesterday  Taking only 20 mg tab  Patient is not symptomatic   RN mentioned that of the total 71/2 lb gain he has lost 2 lbs  Please advise if he should be taking this dose   May call the patient at home  [phone]
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
## 2022-07-21 ENCOUNTER — OFFICE VISIT (OUTPATIENT)
Dept: PAIN MEDICINE | Facility: CLINIC | Age: 62
End: 2022-07-21
Payer: MEDICARE

## 2022-07-21 VITALS
SYSTOLIC BLOOD PRESSURE: 135 MMHG | BODY MASS INDEX: 33.89 KG/M2 | TEMPERATURE: 98.2 F | DIASTOLIC BLOOD PRESSURE: 75 MMHG | HEART RATE: 78 BPM | RESPIRATION RATE: 20 BRPM | HEIGHT: 71 IN

## 2022-07-21 DIAGNOSIS — M48.061 FORAMINAL STENOSIS OF LUMBAR REGION: ICD-10-CM

## 2022-07-21 DIAGNOSIS — M54.42 CHRONIC LEFT-SIDED LOW BACK PAIN WITH LEFT-SIDED SCIATICA: ICD-10-CM

## 2022-07-21 DIAGNOSIS — M96.1 LUMBAR POST-LAMINECTOMY SYNDROME: ICD-10-CM

## 2022-07-21 DIAGNOSIS — G89.4 CHRONIC PAIN SYNDROME: Primary | ICD-10-CM

## 2022-07-21 DIAGNOSIS — M54.16 LUMBAR RADICULOPATHY: ICD-10-CM

## 2022-07-21 DIAGNOSIS — G89.29 CHRONIC LEFT-SIDED LOW BACK PAIN WITH LEFT-SIDED SCIATICA: ICD-10-CM

## 2022-07-21 PROCEDURE — 99214 OFFICE O/P EST MOD 30 MIN: CPT | Performed by: ANESTHESIOLOGY

## 2022-07-21 RX ORDER — CYCLOBENZAPRINE HCL 10 MG
10 TABLET ORAL 3 TIMES DAILY PRN
Qty: 90 TABLET | Refills: 2 | Status: ON HOLD | OUTPATIENT
Start: 2022-07-21

## 2022-07-21 RX ORDER — GABAPENTIN 600 MG/1
600 TABLET ORAL 3 TIMES DAILY
Qty: 90 TABLET | Refills: 2 | Status: ON HOLD | OUTPATIENT
Start: 2022-07-21
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
## 2022-07-26 DIAGNOSIS — Z79.4 TYPE 2 DIABETES MELLITUS WITH COMPLICATION, WITH LONG-TERM CURRENT USE OF INSULIN (HCC): Primary | ICD-10-CM

## 2022-07-26 DIAGNOSIS — E11.8 TYPE 2 DIABETES MELLITUS WITH COMPLICATION, WITH LONG-TERM CURRENT USE OF INSULIN (HCC): Primary | ICD-10-CM

## 2022-07-26 RX ORDER — FLASH GLUCOSE SENSOR
KIT MISCELLANEOUS
Qty: 6 EACH | Refills: 3 | Status: SHIPPED | OUTPATIENT
Start: 2022-07-26

## 2022-07-28 ENCOUNTER — TELEMEDICINE (OUTPATIENT)
Dept: CARDIOLOGY CLINIC | Facility: CLINIC | Age: 62
End: 2022-07-28
Payer: MEDICARE

## 2022-07-28 VITALS
HEART RATE: 95 BPM | DIASTOLIC BLOOD PRESSURE: 80 MMHG | SYSTOLIC BLOOD PRESSURE: 140 MMHG | BODY MASS INDEX: 36.26 KG/M2 | WEIGHT: 260 LBS | OXYGEN SATURATION: 96 %

## 2022-07-28 DIAGNOSIS — I48.20 CHRONIC A-FIB (HCC): ICD-10-CM

## 2022-07-28 DIAGNOSIS — I25.10 CORONARY ARTERY DISEASE INVOLVING NATIVE HEART WITHOUT ANGINA PECTORIS, UNSPECIFIED VESSEL OR LESION TYPE: Primary | ICD-10-CM

## 2022-07-28 DIAGNOSIS — I50.32 CHRONIC DIASTOLIC (CONGESTIVE) HEART FAILURE (HCC): ICD-10-CM

## 2022-07-28 DIAGNOSIS — E78.2 MIXED HYPERLIPIDEMIA: ICD-10-CM

## 2022-07-28 PROCEDURE — 99213 OFFICE O/P EST LOW 20 MIN: CPT | Performed by: INTERNAL MEDICINE
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
## 2022-07-29 DIAGNOSIS — F99 PSYCHIATRIC DISORDER: Primary | ICD-10-CM

## 2022-07-29 RX ORDER — QUETIAPINE FUMARATE 300 MG/1
TABLET, FILM COATED ORAL
Qty: 30 TABLET | Refills: 2 | Status: SHIPPED | OUTPATIENT
Start: 2022-07-29 | End: 2022-08-30 | Stop reason: SDUPTHER

## 2022-08-10 ENCOUNTER — TELEPHONE (OUTPATIENT)
Dept: PULMONOLOGY | Facility: CLINIC | Age: 62
End: 2022-08-10

## 2022-08-10 NOTE — TELEPHONE ENCOUNTER
Pt called re: he received a letter from [employer] in regards to his CPAP that he's due for an overnight pulse-ox test  Alonzo seemed a little confused and was asking about medicare guidelines    Please advise: [phone]

## 2022-08-12 NOTE — TELEPHONE ENCOUNTER
LM for pt, needs appt asap for O2 recertification as per Olga Toledo at [facility]   Please schedule when he calls with 6mw

## 2022-08-19 ENCOUNTER — TELEPHONE (OUTPATIENT)
Dept: FAMILY MEDICINE CLINIC | Facility: CLINIC | Age: 62
End: 2022-08-19

## 2022-08-19 NOTE — TELEPHONE ENCOUNTER
Dr Elif Underwood    Patient's scooter is broken couple weeks now  Wants to know if you received paperwork from [facility] for this  Please advise

## 2022-08-24 ENCOUNTER — TELEPHONE (OUTPATIENT)
Dept: FAMILY MEDICINE CLINIC | Facility: CLINIC | Age: 62
End: 2022-08-24

## 2022-08-24 NOTE — TELEPHONE ENCOUNTER
----- Message from Bryce Sebastian MD sent at 8/23/2022  6:18 PM EDT -----  Regarding: FW: Miquel Nicely   Please find out how much medication pt has left--and schedule virtual appt before he runs out to review meds prior to refill --thanks  ----- Message -----  From: Anne-Marie Louis MA  Sent: 8/23/2022   2:27 PM EDT  To: Bryce Sebastian MD  Subject: Barbie Swanson: Miquel Nicely                                    ----- Message -----  From: Gilda Man  Sent: 8/23/2022   2:19 PM EDT  To: Amilcar Ventura [facility]  Subject: Carlos Leon  I lost my psychiatrist because she does not accept Medicaid  I just got it back as of August 1st  I am on Seroquel 300mgs at bedtime, 100mgs in the morning, Zoloft 10mgs at bedtime and Xanax 2mgs at bedtime  Can you write me the prescription? If you can put 2 refills on it  I've been calling around for a new psychiatrist but so far there is a waiting list  I would appreciate if you will  As usual [facility] use  [facility]  Thank you  Miquel Nicely  1960  By the way I spoke to [facility] today and they did get your work order  We are just waiting on Medicare

## 2022-08-24 NOTE — TELEPHONE ENCOUNTER
If he only has enough until the end of this month please sched virtual this week or next as I'd like to clarify meds/doses needed before sending into pharmacy--thanks

## 2022-08-26 NOTE — TELEPHONE ENCOUNTER
Dr Vita Field:    Patient called and said that he has an appointment with you today at 8  It is not on your schedule for today but is scheduled for 8/30  Patient wanted me to clarify that this is correct with you? He said he received a phone call yesterday about today's appointment?

## 2022-08-29 ENCOUNTER — TELEPHONE (OUTPATIENT)
Dept: PAIN MEDICINE | Facility: CLINIC | Age: 62
End: 2022-08-29

## 2022-08-29 NOTE — TELEPHONE ENCOUNTER
S/w pt as seen in task below  Pt rqst addit refills of Gabapentin stating "so I don't run out again or incase my health takes a turn "    Nxt OV on 10/24/22 with AS     Pt filled final refill on script 8/29/22 nxt script need will be 9/29/22  Gabapentin 300mg TID X 90tabs

## 2022-08-29 NOTE — TELEPHONE ENCOUNTER
Pt contacted Call Center requested refill of their medication  Medication Name:gabapentin (Neurontin) 600 MG tablet           Dosage of Med: 600 mg        Frequency of Med: Take 1 tablet (600 mg total) by mouth 3 (three) times a day    Remaining Medication:0       Pharmacy and Location:  [place] [address]   Phone:  [phone]      Pt  Preferred Callback Phone Number: [phone]      Thank you

## 2022-08-29 NOTE — TELEPHONE ENCOUNTER
S/w pt and advised that  [facility] in [address] [place] [address],[address] has a refrill for Gabapentin  Pt states he ran out of Gabapentin over the weekend and his pain was coming back  RN advised pt that there should not be any abrupt stopping of Gabapentin due to its neuropathic properties and that the pt should call us 24-48hrs prior to running out of med  Pt agreeable and appreciative of call

## 2022-08-30 ENCOUNTER — TELEMEDICINE (OUTPATIENT)
Dept: FAMILY MEDICINE CLINIC | Facility: CLINIC | Age: 62
End: 2022-08-30
Payer: MEDICARE

## 2022-08-30 DIAGNOSIS — E11.65 TYPE 2 DIABETES MELLITUS WITH HYPERGLYCEMIA, WITH LONG-TERM CURRENT USE OF INSULIN (HCC): ICD-10-CM

## 2022-08-30 DIAGNOSIS — E11.8 TYPE 2 DIABETES MELLITUS WITH COMPLICATION, WITH LONG-TERM CURRENT USE OF INSULIN (HCC): ICD-10-CM

## 2022-08-30 DIAGNOSIS — Z79.4 TYPE 2 DIABETES MELLITUS WITH HYPERGLYCEMIA, WITH LONG-TERM CURRENT USE OF INSULIN (HCC): ICD-10-CM

## 2022-08-30 DIAGNOSIS — Z79.4 TYPE 2 DIABETES MELLITUS WITH COMPLICATION, WITH LONG-TERM CURRENT USE OF INSULIN (HCC): ICD-10-CM

## 2022-08-30 DIAGNOSIS — F99 PSYCHIATRIC DISORDER: ICD-10-CM

## 2022-08-30 PROCEDURE — 99213 OFFICE O/P EST LOW 20 MIN: CPT | Performed by: FAMILY MEDICINE

## 2022-08-30 RX ORDER — BUSPIRONE HYDROCHLORIDE 10 MG/1
10 TABLET ORAL 2 TIMES DAILY
Qty: 60 TABLET | Refills: 0 | Status: ON HOLD | OUTPATIENT
Start: 2022-08-30

## 2022-08-30 RX ORDER — ISOPROPYL ALCOHOL 0.75 G/1
SWAB TOPICAL
Qty: 500 EACH | Refills: 1 | Status: ON HOLD | OUTPATIENT
Start: 2022-08-30

## 2022-08-30 RX ORDER — ALPRAZOLAM 2 MG/1
2 TABLET ORAL
Qty: 30 TABLET | Refills: 0 | Status: ON HOLD | OUTPATIENT
Start: 2022-08-30

## 2022-08-30 RX ORDER — QUETIAPINE FUMARATE 50 MG/1
100 TABLET, EXTENDED RELEASE ORAL EVERY MORNING
Qty: 60 TABLET | Refills: 0 | Status: SHIPPED | OUTPATIENT
Start: 2022-08-30 | End: 2022-09-16

## 2022-08-30 RX ORDER — LIRAGLUTIDE 6 MG/ML
INJECTION SUBCUTANEOUS
Qty: 9 ML | Refills: 1 | Status: ON HOLD | OUTPATIENT
Start: 2022-08-30

## 2022-08-30 RX ORDER — QUETIAPINE FUMARATE 300 MG/1
300 TABLET, FILM COATED ORAL
Qty: 30 TABLET | Refills: 0 | Status: SHIPPED | OUTPATIENT
Start: 2022-08-30 | End: 2022-09-19 | Stop reason: SDUPTHER
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
## 2022-09-02 NOTE — TELEPHONE ENCOUNTER
S/w pt's pharmacy who advised pt has 2 refills remaining  S/w pt and advised of same  Pt will have enough medication to get him to his ov on 10/24

## 2022-09-16 DIAGNOSIS — F99 PSYCHIATRIC DISORDER: ICD-10-CM

## 2022-09-16 RX ORDER — QUETIAPINE FUMARATE 50 MG/1
100 TABLET, EXTENDED RELEASE ORAL EVERY MORNING
Qty: 60 TABLET | Refills: 0 | Status: SHIPPED | OUTPATIENT
Start: 2022-09-16 | End: 2022-09-19 | Stop reason: SDUPTHER

## 2022-09-19 DIAGNOSIS — F99 PSYCHIATRIC DISORDER: ICD-10-CM

## 2022-09-19 RX ORDER — QUETIAPINE FUMARATE 300 MG/1
300 TABLET, FILM COATED ORAL
Qty: 30 TABLET | Refills: 0 | Status: ON HOLD | OUTPATIENT
Start: 2022-09-19

## 2022-09-19 RX ORDER — QUETIAPINE FUMARATE 50 MG/1
100 TABLET, EXTENDED RELEASE ORAL EVERY MORNING
Qty: 60 TABLET | Refills: 0 | Status: ON HOLD | OUTPATIENT
Start: 2022-09-19

## 2022-09-28 ENCOUNTER — HOSPITAL ENCOUNTER (OUTPATIENT)
Facility: HOSPITAL | Age: 62
Setting detail: OBSERVATION
End: 2022-09-29
Attending: EMERGENCY MEDICINE | Admitting: INTERNAL MEDICINE
Payer: MEDICARE

## 2022-09-28 DIAGNOSIS — E87.1 HYPONATREMIA: Primary | ICD-10-CM

## 2022-09-28 DIAGNOSIS — G89.29 ACUTE EXACERBATION OF CHRONIC LOW BACK PAIN: ICD-10-CM

## 2022-09-28 DIAGNOSIS — M54.50 ACUTE EXACERBATION OF CHRONIC LOW BACK PAIN: ICD-10-CM

## 2022-09-28 PROBLEM — I50.32 CHRONIC DIASTOLIC (CONGESTIVE) HEART FAILURE (HCC): Status: RESOLVED | Noted: 2022-04-19 | Resolved: 2022-09-28

## 2022-09-28 LAB
ALBUMIN SERPL BCP-MCNC: 3.8 G/DL (ref 3.5–5)
ALP SERPL-CCNC: 64 U/L (ref 46–116)
ALT SERPL W P-5'-P-CCNC: 31 U/L (ref 12–78)
ANION GAP SERPL CALCULATED.3IONS-SCNC: 10 MMOL/L (ref 4–13)
AST SERPL W P-5'-P-CCNC: 26 U/L (ref 5–45)
BASOPHILS # BLD MANUAL: 0 THOUSAND/UL (ref 0–0.1)
BASOPHILS NFR MAR MANUAL: 0 % (ref 0–1)
BILIRUB SERPL-MCNC: 0.6 MG/DL (ref 0.2–1)
BUN SERPL-MCNC: 10 MG/DL (ref 5–25)
CALCIUM SERPL-MCNC: 8.8 MG/DL (ref 8.3–10.1)
CHLORIDE SERPL-SCNC: 94 MMOL/L (ref 96–108)
CO2 SERPL-SCNC: 25 MMOL/L (ref 21–32)
CREAT SERPL-MCNC: 0.69 MG/DL (ref 0.6–1.3)
EOSINOPHIL # BLD MANUAL: 0 THOUSAND/UL (ref 0–0.4)
EOSINOPHIL NFR BLD MANUAL: 0 % (ref 0–6)
ERYTHROCYTE [DISTWIDTH] IN BLOOD BY AUTOMATED COUNT: 13 % (ref 11.6–15.1)
GFR SERPL CREATININE-BSD FRML MDRD: 101 ML/MIN/1.73SQ M
GLUCOSE SERPL-MCNC: 129 MG/DL (ref 65–140)
GLUCOSE SERPL-MCNC: 88 MG/DL (ref 65–140)
HCT VFR BLD AUTO: 38.4 % (ref 36.5–49.3)
HGB BLD-MCNC: 13.4 G/DL (ref 12–17)
LYMPHOCYTES # BLD AUTO: 65 % (ref 14–44)
LYMPHOCYTES # BLD AUTO: 9.7 THOUSAND/UL (ref 0.6–4.47)
MCH RBC QN AUTO: 31.2 PG (ref 26.8–34.3)
MCHC RBC AUTO-ENTMCNC: 34.9 G/DL (ref 31.4–37.4)
MCV RBC AUTO: 90 FL (ref 82–98)
METAMYELOCYTES NFR BLD MANUAL: 2 % (ref 0–1)
MONOCYTES # BLD AUTO: 0.15 THOUSAND/UL (ref 0–1.22)
MONOCYTES NFR BLD: 1 % (ref 4–12)
NEUTROPHILS # BLD MANUAL: 4.78 THOUSAND/UL (ref 1.85–7.62)
NEUTS SEG NFR BLD AUTO: 32 % (ref 43–75)
PLATELET # BLD AUTO: 185 THOUSANDS/UL (ref 149–390)
PLATELET BLD QL SMEAR: ADEQUATE
PMV BLD AUTO: 9.3 FL (ref 8.9–12.7)
POTASSIUM SERPL-SCNC: 4.3 MMOL/L (ref 3.5–5.3)
PROT SERPL-MCNC: 6.9 G/DL (ref 6.4–8.4)
RBC # BLD AUTO: 4.29 MILLION/UL (ref 3.88–5.62)
RBC MORPH BLD: NORMAL
SMUDGE CELLS BLD QL SMEAR: PRESENT
SODIUM SERPL-SCNC: 129 MMOL/L (ref 135–147)
WBC # BLD AUTO: 14.93 THOUSAND/UL (ref 4.31–10.16)

## 2022-09-28 PROCEDURE — 85027 COMPLETE CBC AUTOMATED: CPT | Performed by: EMERGENCY MEDICINE

## 2022-09-28 PROCEDURE — 96372 THER/PROPH/DIAG INJ SC/IM: CPT

## 2022-09-28 PROCEDURE — 80053 COMPREHEN METABOLIC PANEL: CPT | Performed by: EMERGENCY MEDICINE

## 2022-09-28 PROCEDURE — 94760 N-INVAS EAR/PLS OXIMETRY 1: CPT

## 2022-09-28 PROCEDURE — 97163 PT EVAL HIGH COMPLEX 45 MIN: CPT

## 2022-09-28 PROCEDURE — 97110 THERAPEUTIC EXERCISES: CPT

## 2022-09-28 PROCEDURE — 82948 REAGENT STRIP/BLOOD GLUCOSE: CPT

## 2022-09-28 PROCEDURE — 36415 COLL VENOUS BLD VENIPUNCTURE: CPT | Performed by: EMERGENCY MEDICINE

## 2022-09-28 PROCEDURE — 94660 CPAP INITIATION&MGMT: CPT

## 2022-09-28 PROCEDURE — 99285 EMERGENCY DEPT VISIT HI MDM: CPT

## 2022-09-28 PROCEDURE — 99284 EMERGENCY DEPT VISIT MOD MDM: CPT | Performed by: EMERGENCY MEDICINE

## 2022-09-28 PROCEDURE — 85007 BL SMEAR W/DIFF WBC COUNT: CPT | Performed by: EMERGENCY MEDICINE

## 2022-09-28 PROCEDURE — 99220 PR INITIAL OBSERVATION CARE/DAY 70 MINUTES: CPT

## 2022-09-28 RX ORDER — QUETIAPINE FUMARATE 100 MG/1
300 TABLET, FILM COATED ORAL
Status: DISCONTINUED | OUTPATIENT
Start: 2022-09-28 | End: 2022-09-29 | Stop reason: HOSPADM

## 2022-09-28 RX ORDER — LOSARTAN POTASSIUM 50 MG/1
50 TABLET ORAL DAILY
Status: DISCONTINUED | OUTPATIENT
Start: 2022-09-29 | End: 2022-09-29 | Stop reason: HOSPADM

## 2022-09-28 RX ORDER — METOPROLOL SUCCINATE 100 MG/1
200 TABLET, EXTENDED RELEASE ORAL DAILY
Status: DISCONTINUED | OUTPATIENT
Start: 2022-09-29 | End: 2022-09-29 | Stop reason: HOSPADM

## 2022-09-28 RX ORDER — INSULIN GLARGINE 100 [IU]/ML
60 INJECTION, SOLUTION SUBCUTANEOUS EVERY 12 HOURS SCHEDULED
Status: DISCONTINUED | OUTPATIENT
Start: 2022-09-28 | End: 2022-09-29 | Stop reason: HOSPADM

## 2022-09-28 RX ORDER — ONDANSETRON 2 MG/ML
4 INJECTION INTRAMUSCULAR; INTRAVENOUS EVERY 6 HOURS PRN
Status: DISCONTINUED | OUTPATIENT
Start: 2022-09-28 | End: 2022-09-29 | Stop reason: HOSPADM

## 2022-09-28 RX ORDER — KETOROLAC TROMETHAMINE 30 MG/ML
30 INJECTION, SOLUTION INTRAMUSCULAR; INTRAVENOUS ONCE
Status: COMPLETED | OUTPATIENT
Start: 2022-09-28 | End: 2022-09-28

## 2022-09-28 RX ORDER — ATORVASTATIN CALCIUM 80 MG/1
80 TABLET, FILM COATED ORAL DAILY
Status: DISCONTINUED | OUTPATIENT
Start: 2022-09-29 | End: 2022-09-29 | Stop reason: HOSPADM

## 2022-09-28 RX ORDER — ALBUTEROL SULFATE 2.5 MG/3ML
2.5 SOLUTION RESPIRATORY (INHALATION) EVERY 6 HOURS PRN
Status: DISCONTINUED | OUTPATIENT
Start: 2022-09-28 | End: 2022-09-29 | Stop reason: HOSPADM

## 2022-09-28 RX ORDER — TRAMADOL HYDROCHLORIDE 50 MG/1
50 TABLET ORAL ONCE
Status: COMPLETED | OUTPATIENT
Start: 2022-09-28 | End: 2022-09-28

## 2022-09-28 RX ORDER — INSULIN LISPRO 100 [IU]/ML
2-12 INJECTION, SOLUTION INTRAVENOUS; SUBCUTANEOUS
Status: DISCONTINUED | OUTPATIENT
Start: 2022-09-29 | End: 2022-09-29 | Stop reason: HOSPADM

## 2022-09-28 RX ORDER — CYCLOBENZAPRINE HCL 10 MG
10 TABLET ORAL 3 TIMES DAILY
Status: DISCONTINUED | OUTPATIENT
Start: 2022-09-28 | End: 2022-09-29 | Stop reason: HOSPADM

## 2022-09-28 RX ORDER — LOSARTAN POTASSIUM 50 MG/1
50 TABLET ORAL DAILY
Status: DISCONTINUED | OUTPATIENT
Start: 2022-09-29 | End: 2022-09-28

## 2022-09-28 RX ORDER — PANTOPRAZOLE SODIUM 40 MG/1
40 TABLET, DELAYED RELEASE ORAL
Status: DISCONTINUED | OUTPATIENT
Start: 2022-09-29 | End: 2022-09-29 | Stop reason: HOSPADM

## 2022-09-28 RX ORDER — ACETAMINOPHEN 325 MG/1
975 TABLET ORAL EVERY 8 HOURS SCHEDULED
Status: DISCONTINUED | OUTPATIENT
Start: 2022-09-28 | End: 2022-09-29 | Stop reason: HOSPADM

## 2022-09-28 RX ORDER — OXYCODONE HYDROCHLORIDE 5 MG/1
5 TABLET ORAL EVERY 4 HOURS PRN
Status: DISCONTINUED | OUTPATIENT
Start: 2022-09-28 | End: 2022-09-29 | Stop reason: HOSPADM

## 2022-09-28 RX ORDER — LIDOCAINE 50 MG/G
1 PATCH TOPICAL DAILY
Status: DISCONTINUED | OUTPATIENT
Start: 2022-09-28 | End: 2022-09-29 | Stop reason: HOSPADM

## 2022-09-28 RX ORDER — ALPRAZOLAM 0.5 MG/1
2 TABLET ORAL
Status: DISCONTINUED | OUTPATIENT
Start: 2022-09-28 | End: 2022-09-29 | Stop reason: HOSPADM

## 2022-09-28 RX ORDER — INSULIN LISPRO 100 [IU]/ML
2-12 INJECTION, SOLUTION INTRAVENOUS; SUBCUTANEOUS
Status: DISCONTINUED | OUTPATIENT
Start: 2022-09-28 | End: 2022-09-29 | Stop reason: HOSPADM

## 2022-09-28 RX ORDER — QUETIAPINE FUMARATE 50 MG/1
100 TABLET, EXTENDED RELEASE ORAL EVERY MORNING
Status: DISCONTINUED | OUTPATIENT
Start: 2022-09-29 | End: 2022-09-29 | Stop reason: HOSPADM

## 2022-09-28 RX ORDER — DIGOXIN 125 MCG
250 TABLET ORAL DAILY
Status: DISCONTINUED | OUTPATIENT
Start: 2022-09-29 | End: 2022-09-29 | Stop reason: HOSPADM

## 2022-09-28 RX ORDER — ASPIRIN 81 MG/1
81 TABLET ORAL DAILY
Status: DISCONTINUED | OUTPATIENT
Start: 2022-09-29 | End: 2022-09-29 | Stop reason: HOSPADM

## 2022-09-28 RX ORDER — METHOCARBAMOL 500 MG/1
500 TABLET, FILM COATED ORAL EVERY 6 HOURS SCHEDULED
Status: DISCONTINUED | OUTPATIENT
Start: 2022-09-28 | End: 2022-09-28

## 2022-09-28 RX ORDER — BUSPIRONE HYDROCHLORIDE 10 MG/1
10 TABLET ORAL 2 TIMES DAILY
Status: DISCONTINUED | OUTPATIENT
Start: 2022-09-28 | End: 2022-09-29 | Stop reason: HOSPADM

## 2022-09-28 RX ORDER — GABAPENTIN 300 MG/1
600 CAPSULE ORAL 3 TIMES DAILY
Status: DISCONTINUED | OUTPATIENT
Start: 2022-09-28 | End: 2022-09-29 | Stop reason: HOSPADM

## 2022-09-28 RX ADMIN — KETOROLAC TROMETHAMINE 30 MG: 30 INJECTION, SOLUTION INTRAMUSCULAR at 16:12

## 2022-09-28 RX ADMIN — ALPRAZOLAM 2 MG: 0.5 TABLET ORAL at 21:02

## 2022-09-28 RX ADMIN — ACETAMINOPHEN 975 MG: 325 TABLET ORAL at 21:01

## 2022-09-28 RX ADMIN — GABAPENTIN 600 MG: 300 CAPSULE ORAL at 21:02

## 2022-09-28 RX ADMIN — CYCLOBENZAPRINE HYDROCHLORIDE 10 MG: 10 TABLET, FILM COATED ORAL at 21:02

## 2022-09-28 RX ADMIN — OXYCODONE HYDROCHLORIDE 5 MG: 5 TABLET ORAL at 21:14

## 2022-09-28 RX ADMIN — SODIUM CHLORIDE 1000 ML: 0.9 INJECTION, SOLUTION INTRAVENOUS at 19:26

## 2022-09-28 RX ADMIN — TRAMADOL HYDROCHLORIDE 50 MG: 50 TABLET, COATED ORAL at 17:52

## 2022-09-28 RX ADMIN — BUSPIRONE HYDROCHLORIDE 10 MG: 10 TABLET ORAL at 21:02

## 2022-09-28 RX ADMIN — INSULIN GLARGINE 60 UNITS: 100 INJECTION, SOLUTION SUBCUTANEOUS at 21:14

## 2022-09-28 RX ADMIN — APIXABAN 5 MG: 5 TABLET, FILM COATED ORAL at 21:02

## 2022-09-28 RX ADMIN — QUETIAPINE FUMARATE 300 MG: 100 TABLET ORAL at 21:02

## 2022-09-28 NOTE — PHYSICAL THERAPY NOTE
PHYSICAL THERAPY EVALUATION/TREATMENT     09/28/22 1625   Note Type   Note type Evaluation   Pain Assessment   Pain Assessment Tool 0-10   Pain Score 10 - Worst Possible Pain  ([place] area)   Restrictions/Precautions   Other Precautions Fall Risk;Pain;O2  (O2 to 24/7 prior to admission)   Home Living   Type of [address] One level;Elevator   [address] [place] chair   Home Equipment Walker;Cane;Wheelchair-electric   Additional Comments Patient states using electric scooter mostly for the past 2 weeks in apartment and prior to that was using a cane   Prior Function   Lives With [address]  (Home health aide 5 days a week for household tasks  Patient states independence with ADLs not requiring assist of his aide)   ADL Assistance Independent   IADLs Needs assistance   Falls in the last 6 months 1 to 4   Comments Patient with chronic low back pain with increasing nature over the past couple weeks in significant worsening since yesterday therefore pain to the ED due to uncontrollable pain as per patient and inability to function in his home independently   General   Additional Pertinent History Chart reviewed, patient admitted with low back pain, intractable in nature  Patient with resulting gait dysfunction and inability to function independently in his home at this time and lives alone    Patient states using bus transportation to go to the doctor's chest 2 days ago and using his electric scooter in his home independently   Family/Caregiver Present No   Cognition   Overall Cognitive Status WFL   Arousal/Participation Cooperative   Orientation Level Oriented X4   Following Commands Follows multistep commands with increased time or repetition   Comments Patient is hard of hearing and frustrated/agitated at times with evaluation questions   Subjective   Subjective "I absolutely cannot go home today like this"   RLE Assessment   RLE Assessment   (ROM WFL, strength 3+/4-)   LLE Assessment   LLE Assessment   (ROM WFL, strength 3+/ 4-)   Coordination   Movements are Fluid and Coordinated 0   Bed Mobility   Rolling R 3  Moderate assistance   Additional items Assist x 1;Verbal cues;LE management   Supine to Sit 3  Moderate assistance   Additional items Assist x 1;Verbal cues;LE management   Sit to Supine 5  Supervision   Additional Comments Verbal cuing required for proper posture and body mechanics and for log-rolling   Transfers   Sit to Stand 4  Minimal assistance   Additional items Assist x 1   Stand to Sit 4  Minimal assistance   Additional items Assist x 1;Verbal cues   Ambulation/Elevation   Gait Assistance   (Min to mod)   Additional items Assist x 1;Verbal cues; Tactile cues   Assistive Device Rolling walker   Distance 5 ft at bedside with shortened stride length, forward flexed posture, narrow base of support and unsteady gait  Patient with increased LS pain with activity and movement reporting "back spasms"   Balance   Static Sitting Fair   Dynamic Sitting Poor +   Static Standing Fair -   Dynamic Standing Poor +   Ambulatory Poor +   Activity Tolerance   Activity Tolerance Patient limited by pain   Medical Staff Made Aware Yes   Nurse Made Aware Yes   Assessment   Prognosis Good   Problem List Decreased strength;Decreased range of motion;Decreased endurance; Impaired balance;Decreased mobility; Decreased coordination; Impaired hearing;Pain  (Hard of hearing and requiring repetition of commands)   Assessment Patient seen for Physical Therapy evaluation  Patient admitted with back pain/gait dysfunction  Comorbidities affecting patient's physical performance include:   Personal factors affecting patient at time of initial evaluation include: Increased pain, assist for gait, assist for transfers, inability to ambulate household distances and care for self independently   Prior to admission, patient was independent with a cane or electric scooter as needed, independent with ADLs and having home health aide 5 days a week  Please find objective findings from Physical Therapy assessment regarding body systems outlined above with impairments and limitations including   The Barthel Index was used as a functional outcome tool presenting with a score of Barthel Index Score: 45 today indicating maximal limitations of functional mobility and ADLS  Patient's clinical presentation is currently unstable as seen in patient's presentation of pain, gait dysfunction, inability to ambulate household distances, inability to transfer independently and lives alone  Pt would benefit from continued Physical Therapy treatment to address deficits as defined above and maximize level of functional mobility  As demonstrated by objective findings, the assigned level of complexity for this evaluation is high  The patient's AM-[facility] Basic Mobility Inpatient Short Form Raw Score is 13  A Raw score of less than 16 suggests the patient may benefit from discharge to post-acute rehab services although with improved pain control patient should be able to return home with home PT and home health aide as prior  Please also refer to the recommendation of the Physical Therapist for safe discharge planning  Barriers to Discharge Decreased caregiver support; Inaccessible home environment; Other (Comment)  (Pain intensity resulting in gait dysfunction)   Goals   Patient Goals To decrease pain   STG Expiration Date 10/05/22   Short Term Goal #1 Transfers and gait with roller walker independently   Short Term Goal #2 Gait endurance to functional household distances   LTG Expiration Date 10/12/22   Long Term Goal #1 Gait endurance to functional community distances   Plan   Treatment/Interventions ADL retraining;Functional transfer training;LE strengthening/ROM; Therapeutic exercise; Endurance training;Patient/family training;Equipment eval/education; Bed mobility;Gait training   PT Frequency Other (Comment)  (5 times per week)   Recommendation   PT Discharge Recommendation Home with home health rehabilitation   Additional Comments With improved pain control patient should be able to return home with home health aide as prior to admission and use of a electric scooter versjag roller walker   [address] Mobility Inpatient   Turning in Bed Without Bedrails 2   Lying on Back to Sitting on Edge of Flat Bed 2   Moving Bed to Chair 3   Standing Up From Chair 3   Walk in Room 2   Climb 3-5 Stairs 1   Basic Mobility Inpatient Raw Score 13   Basic Mobility Standardized Score 33 99   Highest Level Of Mobility   -[facility] Goal 4: Move to chair/commode   -HL Achieved 6: Walk 10 steps or more   Barthel Index   Feeding 10   Bathing 0   Grooming Score 0   Dressing Score 5   Bladder Score 10   Bowels Score 10   Toilet Use Score 5   Transfers (Bed/Chair) Score 5   Mobility (Level Surface) Score 0   Stairs Score 0   Barthel Index Score 45   Additional Treatment Session   Start Time 1410   End Time 1425   Treatment Assessment S:  Patient states increased pain in inability to care for himself alone due to pain O:  Bilateral lower extremity exercise completed as listed below and education completed in proper posture and body mechanics a:  Patient will benefit from continued physical therapy with progression as tolerated and return home with home care services and continued 5 days a week home health aide   Exercises   Hamstring Stretch Supine;5 reps;PROM   Quad Sets Supine;5 reps   [place]els[place]ides Supine;5 reps   Knee AROM Long Arc Quad Sitting;5 reps;AAROM   Ankle Pumps [employer] reps   Licensure   [employer] License Number  [facility] [id number]

## 2022-09-28 NOTE — ED NOTES
Delayed in administering fluids due to patient being a hard stick awaiting for available RN to do ultra sound guided  Dr Cira Bee aware  2 RN's attempted to try placing an access but unsuccessful  However blood work was obtained       Chana Reddy RN  09/28/22 7390
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
## 2022-09-29 VITALS
BODY MASS INDEX: 41.06 KG/M2 | TEMPERATURE: 97.7 F | HEIGHT: 69 IN | OXYGEN SATURATION: 97 % | DIASTOLIC BLOOD PRESSURE: 71 MMHG | SYSTOLIC BLOOD PRESSURE: 127 MMHG | RESPIRATION RATE: 19 BRPM | HEART RATE: 70 BPM | WEIGHT: 277.2 LBS

## 2022-09-29 LAB
ANION GAP SERPL CALCULATED.3IONS-SCNC: 9 MMOL/L (ref 4–13)
BUN SERPL-MCNC: 9 MG/DL (ref 5–25)
CALCIUM SERPL-MCNC: 8.9 MG/DL (ref 8.3–10.1)
CHLORIDE SERPL-SCNC: 95 MMOL/L (ref 96–108)
CO2 SERPL-SCNC: 27 MMOL/L (ref 21–32)
CREAT SERPL-MCNC: 0.78 MG/DL (ref 0.6–1.3)
ERYTHROCYTE [DISTWIDTH] IN BLOOD BY AUTOMATED COUNT: 13 % (ref 11.6–15.1)
EST. AVERAGE GLUCOSE BLD GHB EST-MCNC: 217 MG/DL
GFR SERPL CREATININE-BSD FRML MDRD: 96 ML/MIN/1.73SQ M
GLUCOSE SERPL-MCNC: 102 MG/DL (ref 65–140)
GLUCOSE SERPL-MCNC: 123 MG/DL (ref 65–140)
GLUCOSE SERPL-MCNC: 181 MG/DL (ref 65–140)
GLUCOSE SERPL-MCNC: 207 MG/DL (ref 65–140)
GLUCOSE SERPL-MCNC: 208 MG/DL (ref 65–140)
GLUCOSE SERPL-MCNC: 278 MG/DL (ref 65–140)
HBA1C MFR BLD: 9.2 %
HCT VFR BLD AUTO: 38.1 % (ref 36.5–49.3)
HGB BLD-MCNC: 13.1 G/DL (ref 12–17)
MCH RBC QN AUTO: 31.1 PG (ref 26.8–34.3)
MCHC RBC AUTO-ENTMCNC: 34.4 G/DL (ref 31.4–37.4)
MCV RBC AUTO: 91 FL (ref 82–98)
NRBC BLD AUTO-RTO: 0 /100 WBCS
PLATELET # BLD AUTO: 152 THOUSANDS/UL (ref 149–390)
PMV BLD AUTO: 9.1 FL (ref 8.9–12.7)
POTASSIUM SERPL-SCNC: 3.9 MMOL/L (ref 3.5–5.3)
RBC # BLD AUTO: 4.21 MILLION/UL (ref 3.88–5.62)
SODIUM SERPL-SCNC: 131 MMOL/L (ref 135–147)
WBC # BLD AUTO: 11.59 THOUSAND/UL (ref 4.31–10.16)

## 2022-09-29 PROCEDURE — 97530 THERAPEUTIC ACTIVITIES: CPT

## 2022-09-29 PROCEDURE — 97167 OT EVAL HIGH COMPLEX 60 MIN: CPT

## 2022-09-29 PROCEDURE — 82948 REAGENT STRIP/BLOOD GLUCOSE: CPT

## 2022-09-29 PROCEDURE — 91913: CPT | Performed by: STUDENT IN AN ORGANIZED HEALTH CARE EDUCATION/TRAINING PROGRAM

## 2022-09-29 PROCEDURE — 94760 N-INVAS EAR/PLS OXIMETRY 1: CPT

## 2022-09-29 PROCEDURE — 99217 PR OBSERVATION CARE DISCHARGE MANAGEMENT: CPT | Performed by: STUDENT IN AN ORGANIZED HEALTH CARE EDUCATION/TRAINING PROGRAM

## 2022-09-29 PROCEDURE — 94640 AIRWAY INHALATION TREATMENT: CPT

## 2022-09-29 PROCEDURE — 83036 HEMOGLOBIN GLYCOSYLATED A1C: CPT

## 2022-09-29 PROCEDURE — 80048 BASIC METABOLIC PNL TOTAL CA: CPT

## 2022-09-29 PROCEDURE — 85027 COMPLETE CBC AUTOMATED: CPT

## 2022-09-29 RX ADMIN — INSULIN LISPRO 4 UNITS: 100 INJECTION, SOLUTION INTRAVENOUS; SUBCUTANEOUS at 11:47

## 2022-09-29 RX ADMIN — CYCLOBENZAPRINE HYDROCHLORIDE 10 MG: 10 TABLET, FILM COATED ORAL at 16:50

## 2022-09-29 RX ADMIN — ACETAMINOPHEN 975 MG: 325 TABLET ORAL at 06:19

## 2022-09-29 RX ADMIN — FLUTICASONE FUROATE AND VILANTEROL TRIFENATATE 1 PUFF: 100; 25 POWDER RESPIRATORY (INHALATION) at 08:41

## 2022-09-29 RX ADMIN — MODERNA COVID-19 VACCINE, BIVALENT 0.5 ML: 25; 25 INJECTION, SUSPENSION INTRAMUSCULAR at 16:52

## 2022-09-29 RX ADMIN — ACETAMINOPHEN 975 MG: 325 TABLET ORAL at 14:45

## 2022-09-29 RX ADMIN — LOSARTAN POTASSIUM 50 MG: 50 TABLET, FILM COATED ORAL at 08:45

## 2022-09-29 RX ADMIN — LIDOCAINE 5% 1 PATCH: 700 PATCH TOPICAL at 08:43

## 2022-09-29 RX ADMIN — GABAPENTIN 600 MG: 300 CAPSULE ORAL at 20:24

## 2022-09-29 RX ADMIN — OXYCODONE HYDROCHLORIDE 5 MG: 5 TABLET ORAL at 08:42

## 2022-09-29 RX ADMIN — PANTOPRAZOLE SODIUM 40 MG: 40 TABLET, DELAYED RELEASE ORAL at 06:19

## 2022-09-29 RX ADMIN — DIGOXIN 250 MCG: 125 TABLET ORAL at 08:43

## 2022-09-29 RX ADMIN — INSULIN LISPRO 2 UNITS: 100 INJECTION, SOLUTION INTRAVENOUS; SUBCUTANEOUS at 08:41

## 2022-09-29 RX ADMIN — ASPIRIN 81 MG: 81 TABLET, COATED ORAL at 08:42

## 2022-09-29 RX ADMIN — CYCLOBENZAPRINE HYDROCHLORIDE 10 MG: 10 TABLET, FILM COATED ORAL at 08:42

## 2022-09-29 RX ADMIN — BUSPIRONE HYDROCHLORIDE 10 MG: 10 TABLET ORAL at 17:00

## 2022-09-29 RX ADMIN — INSULIN LISPRO 6 UNITS: 100 INJECTION, SOLUTION INTRAVENOUS; SUBCUTANEOUS at 21:08

## 2022-09-29 RX ADMIN — OXYCODONE HYDROCHLORIDE 5 MG: 5 TABLET ORAL at 16:50

## 2022-09-29 RX ADMIN — SERTRALINE 50 MG: 50 TABLET, FILM COATED ORAL at 08:42

## 2022-09-29 RX ADMIN — CYCLOBENZAPRINE HYDROCHLORIDE 10 MG: 10 TABLET, FILM COATED ORAL at 20:24

## 2022-09-29 RX ADMIN — OXYCODONE HYDROCHLORIDE 5 MG: 5 TABLET ORAL at 02:31

## 2022-09-29 RX ADMIN — QUETIAPINE FUMARATE 100 MG: 50 TABLET, EXTENDED RELEASE ORAL at 08:41

## 2022-09-29 RX ADMIN — ATORVASTATIN CALCIUM 80 MG: 80 TABLET, FILM COATED ORAL at 08:43

## 2022-09-29 RX ADMIN — INSULIN GLARGINE 60 UNITS: 100 INJECTION, SOLUTION SUBCUTANEOUS at 08:41

## 2022-09-29 RX ADMIN — APIXABAN 5 MG: 5 TABLET, FILM COATED ORAL at 08:42

## 2022-09-29 RX ADMIN — APIXABAN 5 MG: 5 TABLET, FILM COATED ORAL at 17:00

## 2022-09-29 RX ADMIN — INSULIN GLARGINE 60 UNITS: 100 INJECTION, SOLUTION SUBCUTANEOUS at 21:07

## 2022-09-29 RX ADMIN — ALBUTEROL SULFATE 2.5 MG: 2.5 SOLUTION RESPIRATORY (INHALATION) at 10:48

## 2022-09-29 RX ADMIN — ALPRAZOLAM 2 MG: 0.5 TABLET ORAL at 21:03

## 2022-09-29 RX ADMIN — OXYCODONE HYDROCHLORIDE 5 MG: 5 TABLET ORAL at 21:05

## 2022-09-29 RX ADMIN — GABAPENTIN 600 MG: 300 CAPSULE ORAL at 16:50

## 2022-09-29 RX ADMIN — QUETIAPINE FUMARATE 300 MG: 100 TABLET ORAL at 21:05

## 2022-09-29 RX ADMIN — GABAPENTIN 600 MG: 300 CAPSULE ORAL at 08:42

## 2022-09-29 RX ADMIN — OXYCODONE HYDROCHLORIDE 5 MG: 5 TABLET ORAL at 12:48

## 2022-09-29 RX ADMIN — BUSPIRONE HYDROCHLORIDE 10 MG: 10 TABLET ORAL at 08:42

## 2022-09-29 RX ADMIN — INSULIN LISPRO 4 UNITS: 100 INJECTION, SOLUTION INTRAVENOUS; SUBCUTANEOUS at 16:51

## 2022-09-29 RX ADMIN — METOPROLOL SUCCINATE 200 MG: 100 TABLET, EXTENDED RELEASE ORAL at 08:45

## 2022-09-29 RX ADMIN — ACETAMINOPHEN 975 MG: 325 TABLET ORAL at 21:05

## 2022-09-29 NOTE — OCCUPATIONAL THERAPY NOTE
OT EVALUATION       09/29/22 1000   Note Type   Note type Evaluation   Restrictions/Precautions   Other Precautions Chair Alarm; Bed Alarm;O2;Fall Risk   Pain Assessment   Pain Assessment Tool 0-10   Pain Score 9   Pain Location/Orientation Location: Back   Home Living   Type of Home Apartment   Home Layout One level;Elevator   Bathroom Equipment Shower chair   Home Equipment Walker;Cane;Wheelchair-electric   Prior Function   Level of [place] Independent with ADLs and functional mobility; Needs assistance with IADLs   Lives With Alone   Receives Help From Home health   ADL Assistance Independent   IADLs Needs assistance  (Home health aide 5 days a week for household tasks )   Falls in the last 6 months 1 to 4   Comments Patient admitted with back pain    Patient reports worsening back pain over the last few weeks and limits his ablility to care for himself   ADL   Eating Assistance 5  Supervision/Setup   Grooming Assistance 4  Minimal Assistance   UB Bathing Assistance 4  Minimal Assistance   LB [address] 3  Moderate Assistance   UB Dressing Assistance 4  Minimal Assistance   LB Dressing Assistance 3  Moderate Assistance   Toileting Assistance  3  Moderate Assistance   Bed Mobility   Supine to Sit 3  Moderate assistance   Additional items Assist x 1;Verbal cues   Sit to Supine 5  Supervision   Transfers   Sit to Stand 4  Minimal assistance   Stand to Sit 4  Minimal assistance   Toilet transfer 4  Minimal assistance   Additional items Commode   Functional Mobility   Functional Mobility 4  Minimal assistance   Additional Comments few steps to commode and back   Additional items Rolling walker   Balance   Static Sitting Fair +   Dynamic Sitting Fair -   Static Standing Fair -   Dynamic Standing Poor +   Activity Tolerance   Activity Tolerance Patient limited by fatigue;Patient limited by pain   RUE Assessment   RUE Assessment   (elbow/ WFL, shoulder 30 degrees flexion AROM)   LUE Assessment   LUE Assessment   (elbow/ WFL, shoulder 30 degrees flexion AROM)   Cognition   Overall Cognitive Status WFL   Arousal/Participation Cooperative   Attention Within functional limits   Orientation Level Oriented X4   Following Commands Follows multistep commands with increased time or repetition   Assessment   Limitation Decreased ADL status; Decreased UE strength;Decreased Safe judgement during ADL;Decreased endurance;Decreased high-level ADLs; Decreased self-care trans  (decreased balance and mobility)   Prognosis Good   Assessment Patient evaluated by Occupational Therapy  Patient admitted with Acute exacerbation of chronic low back pain  The patients occupational profile, medical and therapy history includes a extensive additional review of physical, cognitive, or psychosocial history related to current functional performance  Comorbidities affecting functional mobility and ADLS include: Afib, Bipolar disorder, CAD, chronic back pain, CHF, COPD, diabetes and hypertension  Prior to admission, patient was independent with functional mobility with cane/electric wheelchair, independent with ADLS and requiring assist for IADLS  The evaluation identifies the following performance deficits: weakness, impaired balance, decreased endurance, increased fall risk, new onset of impairment of functional mobility, decreased ADLS, decreased IADLS, pain, decreased activity tolerance, decreased safety awareness, impaired judgement and decreased strength, that result in activity limitations and/or participation restrictions  This evaluation requires clinical decision making of high complexity, because the patient presents with comorbidites that affect occupational performance and required significant modification of tasks or assistance with consideration of multiple treatment options    The Barthel Index was used as a functional outcome tool presenting with a score of Barthel Index Score: 45, indicating marked limitations of functional mobility and ADLS  The patient's raw score on the AM-PAC Daily Activity inpatient short form is 17, standardized score is 37 26, less than 39 4  Patients at this level are likely to benefit from DC to post-acute rehabilitation services  Please refer to the recommendation of the Occupational Therapist for safe DC planning  Patient will benefit from skilled Occupational Therapy services to address above deficits and facilitate a safe return to prior level of function  Goals   Patient Goals to decrease pain   STG Time Frame   (1-7 days)   Short Term Goal  Goals established to promote Patient Goals: to decrease pain:  Patient will increase standing tolerance to 3 minutes during ADL task to decrease assistance level and decrease fall risk; Patient will increase bed mobility to min assist in preparation for ADLS and transfers; Patient will increase functional mobility to and from bathroom with rolling walker with min assist to increase performance with ADLS and to use a toilet; Patient will tolerate 10 minutes of UE ROM/strengthening to increase general activity tolerance and performance in ADLS/IADLS; Patient will improve functional activity tolerance to 10 minutes of sustained functional tasks to increase participation in basic self-care and decrease assistance level;  Patient will be able to to verbalize understanding and perform energy conservation/proper body mechanics during ADLS and functional mobility at least 75% of the time with minimal cueing to decrease signs of fatigue and increase stamina to return to prior level of function; Patient will increase dynamic sitting balance to fair+ to improve the ability to sit at edge of bed or on a chair for ADLS;  Patient will increase dynamic standing balance to fair to improve postural stability and decrease fall risk during standing ADLS and transfers     LTG Time Frame   (8-14 days)   Long Term Goal Patient will increase standing tolerance to 6 minutes during ADL task to decrease assistance level and decrease fall risk; Patient will increase bed mobility to supervision in preparation for ADLS and transfers; Patient will increase functional mobility to and from bathroom with rolling walker with supervision to increase performance with ADLS and to use a toilet; Patient will tolerate 20 minutes of UE ROM/strengthening to increase general activity tolerance and performance in ADLS/IADLS; Patient will improve functional activity tolerance to 20 minutes of sustained functional tasks to increase participation in basic self-care and decrease assistance level;  Patient will be able to to verbalize understanding and perform energy conservation/proper body mechanics during ADLS and functional mobility at least 90% of the time to decrease signs of fatigue and increase stamina to return to prior level of function; Patient will increase dynamic sitting balance to good to improve the ability to sit at edge of bed or on a chair for ADLS;  Patient will increase dynamic standing balance to fair+ to improve postural stability and decrease fall risk during standing ADLS and transfers  Pt will score >/= 21/24 on AM-PAC Daily Activity Inpatient scale to promote safe independence with ADLs and functional mobility; Pt will score >/= 75/100 on Barthel Index in order to decrease caregiver assistance needed and increase ability to perform ADLs and functional mobility     Functional Transfer Goals   Pt Will Perform All Functional Transfers   (STG supervision LTG Independent)   ADL Goals   Pt Will Perform Grooming Standing at sink  (STG supervision LTG Independent)   Pt Will Perform Bathing   (STG min assist LTG supervision)   Pt Will Perform UE Dressing   (STG supervision LTG Independent)   Pt Will Perform LE Dressing   (STG min assist LTG supervision)   Pt Will Perform Toileting   (STG supervision LTG Independent)   Plan   Treatment Interventions ADL retraining;Functional transfer training;UE strengthening/ROM; Endurance training;Patient/family training;Equipment evaluation/education; Activityengagement; Compensatory technique education   Goal Expiration Date 10/13/22   OT Frequency 3-5x/wk   Recommendation   OT Discharge Recommendation Post acute rehabilitation services   AM-PAC Daily Activity Inpatient   Lower Body Dressing 2   Bathing 2   Toileting 3   Upper Body Dressing 3   Grooming 3   Eating 4   Daily Activity Raw Score 17   Daily Activity Standardized Score (Calc for Raw Score >=11) 37 26   AM-PAC Applied Cognition Inpatient   Following a Speech/Presentation 4   Understanding Ordinary Conversation 4   Taking Medications 4   Remembering Where Things Are Placed or Put Away 4   Remembering List of 4-5 Errands 4   Taking Care of Complicated Tasks 4   Applied Cognition Raw Score 24   Applied Cognition Standardized Score 62 21   Barthel Index   Feeding 10   Bathing 0   Grooming Score 0   Dressing Score 5   Bladder Score 10   Bowels Score 10   Toilet Use Score 5   Transfers (Bed/Chair) Score 5   Mobility (Level Surface) Score 0   Stairs Score 0   Barthel Index Score 45   Licensure   [place] License Number  Jaci Davis Gary 86 OTR/L [id number]

## 2022-09-29 NOTE — PLAN OF CARE
Problem: MOBILITY - ADULT  Goal: Maintain or return to baseline ADL function  Description: INTERVENTIONS:  -  Assess patient's ability to carry out ADLs; assess patient's baseline for ADL function and identify physical deficits which impact ability to perform ADLs (bathing, care of mouth/teeth, toileting, grooming, dressing, etc )  - Assess/evaluate cause of self-care deficits   - Assess range of motion  - Assess patient's mobility; develop plan if impaired  - Assess patient's need for assistive devices and provide as appropriate  - Encourage maximum independence but intervene and supervise when necessary  - Involve family in performance of ADLs  - Assess for home care needs following discharge   - Consider OT consult to assist with ADL evaluation and planning for discharge  - Provide patient education as appropriate  Outcome: Progressing  Goal: Maintains/Returns to pre admission functional level  Description: INTERVENTIONS:  - Perform BMAT or MOVE assessment daily    - Set and communicate daily mobility goal to care team and patient/family/caregiver  - Collaborate with rehabilitation services on mobility goals if consulted  - Perform Range of Motion 2 times a day  - Reposition patient every 2 hours    - Dangle patient 2 times a day  - Stand patient 2 times a day  - Ambulate patient 2 times a day  - Out of bed to chair 2 times a day   - Out of bed for meals 2 times a day  - Out of bed for toileting  - Record patient progress and toleration of activity level   Outcome: Progressing     Problem: PAIN - ADULT  Goal: Verbalizes/displays adequate comfort level or baseline comfort level  Description: Interventions:  - Encourage patient to monitor pain and request assistance  - Assess pain using appropriate pain scale  - Administer analgesics based on type and severity of pain and evaluate response  - Implement non-pharmacological measures as appropriate and evaluate response  - Consider cultural and social influences on pain and pain management  - Notify physician/advanced practitioner if interventions unsuccessful or patient reports new pain  Outcome: Progressing     Problem: RESPIRATORY - ADULT  Goal: Achieves optimal ventilation and oxygenation  Description: INTERVENTIONS:  - Assess for changes in respiratory status  - Assess for changes in mentation and behavior  - Position to facilitate oxygenation and minimize respiratory effort  - Oxygen administered by appropriate delivery if ordered  - Initiate smoking cessation education as indicated  - Encourage broncho-pulmonary hygiene including cough, deep breathe, Incentive Spirometry  - Assess the need for suctioning and aspirate as needed  - Assess and instruct to report SOB or any respiratory difficulty  - Respiratory Therapy support as indicated  Outcome: Progressing

## 2022-09-29 NOTE — DISCHARGE SUMMARY
[facility]  Discharge- Mario Mcfadden 1960, 60 y o  male MRN: [id number]  Unit/Bed#: [address] [place] [address] Encounter: [id number]  Primary Care Provider: Rick Cervantes MD   Date and time admitted to hospital: 9/28/2022  3:34 PM    * Acute exacerbation of chronic low back pain  Assessment & Plan  acute on chronic,  · Previous CT scans showed thoracolumbar degenerative disc disease  · History of prior lumbar surgery years ago  · Seen by PT in ED, recommended observation and outpatient PT on discharge ([facility])  · Tylenol around the clock, continue flexeril, gabapentin, lidocaine patch, oxycodone as needed  · PT treatment in AM  · CM consult    CLL (chronic lymphocytic leukemia) ([facility] )  Assessment & Plan  History of CLL, follows with oncology    Atrial fibrillation  Assessment & Plan  Controlled  Continue home Eliquis 5mg bid, digoxin 250mcg daily, metoprolol 200mg daily    COPD (chronic obstructive pulmonary disease) ([facility] )  Assessment & Plan  Chronic  Continue trelegy and albuterol neb prn  O2 as needed    Hyponatremia  Assessment & Plan  · Sodium 129 > 131  · Given 1L bolus in ED, continue IVf  · Trend BMP in AM    Obstructive sleep apnea  Assessment & Plan  Continue bipap (settings 12/5, 3L oxygen)    Type 2 diabetes mellitus with complication, with long-term current use of insulin [facility])  Assessment & Plan  Lab Results   Component Value Date    HGBA1C 8 6 (H) 03/25/2022       Recent Labs     09/28/22  2109 09/29/22  0247 09/29/22  0739   POCGLU 129 102 181*       Blood Sugar Average: Last 72 hrs:  (P) 510 [id number]     Patient on basaglar 60 units and sliding scale with meals at home   Check [id number]   Diabetic diet   Continue basaglar 60 units BID   Start SSI and accuchecks ac, hs      Essential hypertension  Assessment & Plan  Chronic,   Continue losartan and metoprolol      Medical Problems             Resolved Problems  Date Reviewed: 9/29/2022   None Discharging Physician / Practitioner: Andrew Michelle  PCP: Lai Ghosh MD  Admission Date:   Admission Orders (From admission, onward)     Ordered        09/28/22 1809  Place in Observation  Once                      Discharge Date: 09/29/22    Consultations During Hospital Stay:  · na    Procedures Performed:   · na    Significant Findings / Test Results:   · na    Incidental Findings:   · na     Test Results Pending at Discharge (will require follow up):   · na     Outpatient Tests Requested:  · na    Complications:  na    Reason for Admission:  Back pain    Hospital Course:   Oh Francis is a 60 y o  male patient who originally presented to the hospital on 9/28/2022 due to back pain with radiation down his leg patient denied any bladder or bowel dysfunction  Patient requested specific outpatient rehab and knows that this has to be outpatient management  Patient will be discharged to [facility] and will need follow-up PT        Please see above list of diagnoses and related plan for additional information  Condition at Discharge: good    Discharge Day Visit / Exam:   Subjective:  Patient seen and examined at bedside and reported that his back isn't bother him pain is currently 8/10 and this worsened after working with PT patient says he uses topical pain meds which help and her mental based  Vitals: Blood Pressure: 128/78 (09/29/22 0845)  Pulse: 81 (09/29/22 0845)  Temperature: (!) 97 2 °F (36 2 °C) (09/29/22 0845)  Temp Source: Axillary (09/29/22 0327)  Respirations: 19 (09/29/22 0845)  Height: 5' 9" (175 3 cm) (09/28/22 2329)  Weight - Scale: 126 kg (277 lb 3 2 oz) (09/28/22 2329)  SpO2: 99 % (09/29/22 1056)  Exam:   Physical Exam  Vitals and nursing note reviewed  Constitutional:       Appearance: He is well-developed  Comments: Nasal cannula   HENT:      Head: Normocephalic and atraumatic     Eyes:      Conjunctiva/sclera: Conjunctivae normal    Cardiovascular:      Rate and Rhythm: Normal rate and regular rhythm  Heart sounds: No murmur heard  Pulmonary:      Effort: Pulmonary effort is normal  No respiratory distress  Breath sounds: Normal breath sounds  Abdominal:      Palpations: Abdomen is soft  Tenderness: There is no abdominal tenderness  Musculoskeletal:      Cervical back: Neck supple  Right lower leg: No edema  Left lower leg: No edema  Skin:     General: Skin is warm and dry  Neurological:      Mental Status: He is alert and oriented to person, place, and time  Mental status is at baseline  Sensory: No sensory deficit  Coordination: Coordination normal    Psychiatric:         Mood and Affect: Mood normal          Thought Content: Thought content normal          Judgment: Judgment normal           Discussion with Family: Patient declined call to   Discharge instructions/Information to patient and family:   See after visit summary for information provided to patient and family  Provisions for Follow-Up Care:  See after visit summary for information related to follow-up care and any pertinent home health orders  Disposition:   Acute Rehab at [facility]    Planned Readmission:  No     Discharge Statement:  I spent 45 minutes discharging the patient  This time was spent on the day of discharge  I had direct contact with the patient on the day of discharge  Greater than 50% of the total time was spent examining patient, answering all patient questions, arranging and discussing plan of care with patient as well as directly providing post-discharge instructions  Additional time then spent on discharge activities  Discharge Medications:  See after visit summary for reconciled discharge medications provided to patient and/or family        **Please Note: This note may have been constructed using a voice recognition system**

## 2022-09-29 NOTE — [place] UNIVERSAL TRANSFER FORM
[place] UNIVERSAL TRANSFER FORM  (ALL ITEMS MUST BE COMPLETED)    1  TRANSFER FROM: Ronda Vega TO: [facility]    2  DATE OF TRANSFER: 9/29/2022                        TIME OF TRANSFER: 221:30    3  PATIENT NAME: APOLINAR Gutierrez      YOB: 1960                             GENDER: male    4  LANGUAGE:   English    5  PHYSICIAN NAME:  Abby Mckenna MD                   PHONE: [phone]  CODE STATUS: Level 1 - Full Code        Out of Hospital DNR Attached: No    7  :                                      :  Extended Emergency Contact Information  Primary Emergency Contact: Winston Taylor  Address: [address]  Mobile Phone: [phone]  Relation: Friend  Secondary Emergency Contact: Rahul Caro  Mobile Phone: [phone]  Relation: Sharif Renee Representative/Proxy:  No           Legal Guardian:  No             NAME OF:           HEALTH CARE REPRESENTATIVE/PROXY:                                         OR           LEGAL GUARDIAN, IF NOT :                                               PHONE:  (Day)           (Night)                        (Cell)    8  REASON FOR TRANSFER: (Must include brief medical history and recent changes in physical function or cognition ) STR            V/S: /79   Pulse 76   Temp 98 °F (36 7 °C)   Resp 19   Ht 5' 9" (1 753 m)   Wt 126 kg (277 lb 3 2 oz)   SpO2 96%   BMI 40 94 kg/m²           PAIN: Yes, Site lower back     9  PRIMARY DIAGNOSIS: Acute exacerbation of chronic low back pain      Secondary Diagnosis:         Pacemaker: No      Internal Defib: No          Mental Health Diagnosis (if Applicable):    10  RESTRAINTS: No     11  RESPIRATORY NEEDS: Oxygen Device 3 L NC,    12  ISOLATION/PRECAUTION: None    13  ALLERGY: Wellbutrin [bupropion]    14   SENSORY:       Vision Glasses, Hearing Good  and Speech Clear    15  SKIN CONDITION: No Wounds    16  DIET: Special (describe)diabetic    17  IV ACCESS: None    18  PERSONAL ITEMS SENT WITH PATIENT: Glasses and Cane    23  ATTACHED DOCUMENTS: MUST ATTACH CURRENT MEDICATION INFORMATION Face Sheet, MAR, Medication Reconciliation, Diagnostic Studies, Operative Report, Respiratory Care, Advanced Directive, Code Status, Discharge Summary, PT Note and OT Note    20  AT RISK ALERTS:Falls        HARM TO: N/A    21  WEIGHT BEARING STATUS:         Left Leg: Full        Right Leg: Full    22  MENTAL STATUS:Alert and Oriented    23  FUNCTION:        Walk: With Help        Transfer: With Help        Toilet: With Help        Feed: Self    24  IMMUNIZATIONS/SCREENING:     Immunization History   Administered Date(s) Administered    COVID-19 J&J (COINTERRA) vaccine 0 5 mL 04/12/2021    COVID-19 Moderna Vac BIVALENT 18 Yr+ Im (BOOSTER ONLY) 09/29/2022    Hep B, adult 12/22/2008, 03/26/2009, 12/08/2009    Influenza Quadrivalent Preservative Free 3 years and older IM 09/25/2017    Influenza, injectable, quadrivalent, preservative free 0 5 mL 10/08/2018    Influenza, recombinant, quadrivalent,injectable, preservative free 10/12/2020, 11/26/2021    Influenza, seasonal, injectable 10/14/2003, 12/28/2004, 10/14/2005, 10/29/2007, 11/14/2008, 10/05/2009, 01/05/2011, 09/28/2011, 10/26/2012, 09/04/2013, 10/11/2014, 09/08/2015, 09/28/2016    MMR 12/04/2008, 03/26/2009    Meningococcal, Unknown Serogroups 12/22/2008    Pneumococcal Conjugate 13-Valent 09/08/2015, 11/29/2016    Pneumococcal Polysaccharide PPV23 10/14/2003    Tdap 12/04/2008    Tuberculin Skin Test-PPD Intradermal 10/30/2007, 05/14/2009, 06/02/2009, 04/20/2010, 05/04/2010       25  BOWEL: Continent    26  BLADDER: Continent    27   SENDING FACILITY CONTACT: Mika Sanchez                   Title: hospital         Unit: [address]         Phone: [phone] [address] (if known):        Title: Unit:         Phone:         FORM PREFILLED BY (if applicable)       Title:       Unit:        Phone:         FORM COMPLETED BY Beltran Haynes      Title: registered nurse       Phone: [phone]

## 2022-09-29 NOTE — ASSESSMENT & PLAN NOTE
Lab Results   Component Value Date    HGBA1C 8 6 (H) 03/25/2022       Recent Labs     09/28/22  2109 09/29/22  0247 09/29/22  0739   POCGLU 129 102 181*       Blood Sugar Average: Last 72 hrs:  (P) 336 [id number]     Patient on basaglar 60 units and sliding scale with meals at home   Check [id number]   Diabetic diet   Continue basaglar 60 units BID   Start SSI and accuchecks ac, hs

## 2022-09-29 NOTE — PHYSICAL THERAPY NOTE
PT TREATMENT     09/29/22 1155   Note Type   Note Type Treatment   Pain Assessment   Pain Assessment Tool 0-10   Pain Score 9   Pain Location/Orientation   (back/left leg;  pt had pain meds prior to PT)   Restrictions/Precautions   Other Precautions O2;Fall Risk;Pain   General   Chart Reviewed Yes   Cognition   Overall Cognitive Status WFL   Subjective   Subjective "I don't feel any better  I still can't walk too far"   Bed Mobility   Supine to Sit 5  Supervision   Additional items Increased time required; Bedrails   Transfers   Sit to Stand 4  Minimal assistance   Stand to Sit 4  Minimal assistance   Stand pivot 3  Moderate assistance   Additional items   (with walker from bed)   Toilet transfer 4  Minimal assistance   Additional items Standard toilet  (grab bar)   Ambulation/Elevation   Gait pattern   (mildly antalgic L)   Gait Assistance 4  Minimal assist   Assistive Device Rolling walker   Distance 2x20 feet   Balance   Static Sitting Good   Static Standing Good   Activity Tolerance   Activity Tolerance Patient limited by pain; Patient limited by fatigue   Assessment   Prognosis Good   Problem List Decreased strength;Decreased endurance; Impaired balance;Decreased mobility   Assessment Pt continues to c/o significant LBP/L leg pain despite pain meds  Pt was however able to ambulate a longer distance with less assist today  Pt feels he cannot take care of himself at home due to pain and limited mobility so recommend STR  The patient's AM-PAC Basic Mobility Inpatient Short Form Raw Score is 13  A Raw score of greater than 16 suggests the patient may benefit from discharge to home, however pt is below his baseline level of fuction and feels he cannot care for himself at home  Pt has had good success with STR in the past so recommend STR upon DC  Plan   Treatment/Interventions LE strengthening/ROM; Functional transfer training; Therapeutic exercise;Gait training;Patient/family training;Equipment eval/education; Compensatory technique education   Progress Progressing toward goals   PT Frequency Other (Comment)  (5x/week)   Recommendation   PT Discharge Recommendation Post acute rehabilitation services   Equipment Recommended   (pt has a walker, cane, kingter, 02)   AM-PAC Basic Mobility Inpatient   Turning in Bed Without Bedrails 3   Lying on Back to Sitting on Edge of Flat Bed 3   Moving Bed to Chair 3   Standing Up From Chair 3   Walk in Room 3   Climb 3-5 Stairs 2   Basic Mobility Inpatient Raw Score 17   Basic Mobility Standardized Score 39 67   Highest Level Of Mobility   JH-HLM Goal 5: Stand one or more mins   JH-HLM Achieved 7: Walk 25 feet or more   End of Consult   Patient Position at End of Consult All needs within reach; Bedside chair   Licensure   [place] License Number  Jacob   [id number]
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
## 2022-09-29 NOTE — ASSESSMENT & PLAN NOTE
acute on chronic,  · Previous CT scans showed thoracolumbar degenerative disc disease  · History of prior lumbar surgery years ago  · Seen by PT in ED, recommended observation and outpatient PT on discharge ([facility])  · Tylenol around the clock, continue flexeril, gabapentin, lidocaine patch, oxycodone as needed  · PT treatment in AM  · CM consult

## 2022-09-29 NOTE — CASE MANAGEMENT
Case Management Assessment & Discharge Planning Note    Patient name Bhaskar Baez  Location 2 [address] 209/2 [address] 80 MRN [id number]  : 1960 Date 2022       Current Admission Date: 2022  Current Admission Diagnosis:Acute exacerbation of chronic low back pain   Patient Active Problem List    Diagnosis Date Noted    CLL (chronic lymphocytic leukemia) ([facility] ) 2022    Right lower lobe pneumonia 2022    Chronic pain syndrome 2022    Foraminal stenosis of lumbar region 2022    Lumbar post-laminectomy syndrome 2022    Lumbar radiculopathy 2022    Shortness of breath 2022    SIRS (systemic inflammatory response syndrome) ([facility] ) 2022    Dysuria 2021    Peripheral neuropathy 2021    Acute exacerbation of chronic low back pain 2021    BMI 40 0-44 9, adult ([facility] ) 2021    Muscle weakness (generalized) 2021    [facility] 2021    Medicare annual wellness visit, subsequent 2021    Chronic congestive heart failure ([facility] ) 2020    Severe sepsis ([facility] ) 10/29/2020    Hyperlipidemia 10/29/2020    Nutritional anemia, unspecified  10/29/2020    Chest pain 10/11/2020    Simple chronic bronchitis ([facility] ) 10/11/2020    Atrial fibrillation 2020    Hyperglycemia 2020    Transition of care performed with sharing of clinical summary 2020    Obstructive sleep apnea 2020    Obesity (BMI 30-39 9) 2020    Stage 2 chronic kidney disease 2020    Hyponatremia 2020    COPD (chronic obstructive pulmonary disease) ([facility] ) 2020    Chronic a-fib ([facility] ) 2020    H/O vitamin D deficiency 10/28/2019    Dyslipidemia 2019    Type 2 diabetes mellitus with complication, with long-term current use of insulin ([facility] ) 2019    Restrictive ventilatory defect 2019    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 ([facility] ) 03/25/2019    Lung disease, restrictive 11/21/2018    Diabetic ketoacidosis without coma associated with type 2 diabetes mellitus ([place] [address] ) 11/16/2018    Chronic respiratory failure with hypoxia ([place] [address] ) 10/31/2018    Coronary artery disease 09/06/2017    Esophageal reflux 09/06/2017    ANA LILIA (acute kidney injury) ([place] [address] ) 03/20/2017    Back pain 11/30/2016    SHAVONNE and COPD overlap syndrome      Morbid obesity      Insulin-dependent diabetes mellitus with neuropathy 09/02/2016    Fatigue 09/02/2016    GERD 06/08/2016    Cough 01/13/2016    COPD with exacerbation ([place] [address] ) 01/13/2016    Psychiatric disorder     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/25/2015    Erectile dysfunction of organic origin 08/25/2014    Essential hypertension 09/04/2012    Diabetic neuropathy ([place] [address] ) 09/04/2012    Panic disorder without agoraphobia 09/04/2012    Vitamin D deficiency 09/04/2012      LOS (days): 0  Geometric Mean LOS (GMLOS) (days):   Days to GMLOS:     OBJECTIVE:     Current admission status: Observation  Referral Reason: Rehab    Preferred Pharmacy:   [place] [id number]  Phone: [phone] Fax: [phone]    Primary Care Provider: Jayjay Bernal MD    Primary Insurance: MEDICARE  Secondary Insurance:     ASSESSMENT:  [address], [address] [facility] [address] Representative - Friend   Primary Phone: [phone] (Mobile)               Advance Directives  Does patient have a [address]?: No  Was patient offered paperwork?: Yes ([address] [place] [address] form given to pt)  Does patient currently have a Health Care decision maker?: Yes, please see Health Care Proxy section  Does patient have Advance Directives?: No  Was patient offered paperwork?: Yes (Living will and POLST forms given to pt)  Primary Contact: David Spears    Readmission Root Cause  30 Day Readmission: No    Patient Information  Admitted from[de-identified] Home  Mental Status: Alert  During Assessment patient was accompanied by: Not accompanied during assessment  Assessment information provided by[de-identified] Patient  Primary Caregiver: Self  Support Systems: [place] of Residence: [address] [place] [address] do you live in?: [address] [facility] entry access options  Select all that apply : Elevator  Type of Current Residence: Apartment  Upon entering residence, is there a bedroom on the main floor (no further steps)?: Yes  Upon entering residence, is there a bathroom on the main floor (no further steps)?: Yes  In the last 12 months, was there a time when you were not able to pay the mortgage or rent on time?: No  In the last 12 months, how many places have you lived?: 1  In the last 12 months, was there a time when you did not have a steady place to sleep or slept in a shelter (including now)?: No  Homeless/housing insecurity resource given?: N/A  Living Arrangements: Lives Alone    Activities of Daily Living Prior to Admission  Functional Status: Assistance  Completes ADLs independently?: No  Level of ADL dependence: Assistance  Ambulates independently?: Yes (currently using electric scooter but was able to use cane after last rehab discharge)  Does patient use assisted devices?: Yes  Assisted Devices (DME) used: Shower Chair, Nebulizer, Portable Oxygen concentrator, Home Oxygen concentrator, Walker, [address], Other (Comment) (electric scooter, raised toilet seat w/ handles)  DME Company Name (respiratory supplies):  [employer]  Does patient currently own DME?: Yes  What DME does the patient currently own?: Shower Chair, Nebulizer, Portable Oxygen concentrator, Straight Cane, Walker, Home Oxygen concentrator, Other (Comment) (electric scooter, raised toilet seat w/ handles)  Does patient have a history of Outpatient Therapy (PT/OT)?: No  Does the patient have a history of Short-Term Rehab?: Yes ([facility])  Does patient have a history of HHC?: Yes ([facility])  Does patient currently have [facility]?: Yes    Current Home Health Care  Type of Current Home Care Services: Home health aide (through [address])  [address][de-identified] Other (please enter name in comment) ([facility])  [facility] Provider[de-identified] PCP    Patient Information Continued  Income Source: SSI/SSD  Does patient have prescription coverage?: Yes (-[facility] (delivery))  Within the past 12 months, you worried that your food would run out before you got the money to buy more : Never true  Within the past 12 months, the food you bought just didn't last and you didn't have money to get more : Never true  Food insecurity resource given?: N/A  Does patient receive dialysis treatments?: No  Does patient have a history of substance abuse?: No  Does patient have a history of Mental Health Diagnosis?: Yes (Bipolar Disorder)  Is patient receiving treatment for mental health?: Yes (recently had to change providers due to insurance issues, planning to start with Dr Rucker Reason soon)  Has patient received inpatient treatment related to mental health in the last 2 years?: No    Means of Transportation  Means of Transport to Appts[de-identified] [address] [place] [address] ([address])  In the past 12 months, has lack of transportation kept you from medical appointments or from getting medications?: No  In the past 12 months, has lack of transportation kept you from meetings, work, or from getting things needed for daily living?: No  Was application for public transport provided?: N/A      DISCHARGE DETAILS:    Discharge planning discussed with[de-identified] Patient  Freedom of Choice: Yes  Comments - Freedom of Choice: SW met with pt to assess needs and discuss plans  Continued therapy is being recommended  Pt is requesting STR placement at [facility]    CM contacted family/caregiver?: Yes  Were Treatment Team discharge recommendations reviewed with patient/caregiver?: Yes  Did patient/caregiver verbalize understanding of patient care needs?: N/A- going to facility  Were patient/caregiver advised of the risks associated with not following Treatment Team discharge recommendations?: Yes    Contacts  Patient Contacts: Shalonda Frank  Relationship to Patient[de-identified] Friend  Contact Method: Phone  Phone Number: [phone]  Reason/Outcome: Discharge [address]         Is the patient interested in [place] [address] at discharge?: No    DME Referral Provided  Referral made for DME?: No    Other Referral/Resources/Interventions Provided:  Interventions: Short Term Rehab  Referral Comments: STR referral will be made to [facility] per pt request    Treatment Team Recommendation: Home with [address] [place] [address], Short Term Rehab  Discharge Destination Plan[de-identified] Short Term Rehab  Transport at Discharge : Wheelchair [place]

## 2022-09-29 NOTE — MALNUTRITION/BMI
This medical record reflects one or more clinical indicators suggestive of malnutrition and/or morbid obesity  Malnutrition Findings:                                 BMI Findings:  Adult BMI Classifications: Morbid Obesity 40-44 9 (Offered diet education but PT declined, recommend CCD2, 2gm Na to promote weight loss )        Body mass index is 40 94 kg/m²  See Nutrition note dated 9/29/22 for additional details  Completed nutrition assessment is viewable in the nutrition documentation

## 2022-09-29 NOTE — ASSESSMENT & PLAN NOTE
Patient presents with acute on chronic lumbar back pain with left leg sciatica   · Previous CT scans showed thoracolumbar degenerative disc disease  · History of prior lumbar surgery years ago  · Seen by PT in ED, recommended observation and outpatient PT on discharge  · Tylenol around the clock, continue flexeril, gabapentin, lidocaine patch, oxycodone as needed  · PT treatment in AM  · CM consult

## 2022-09-29 NOTE — CASE MANAGEMENT
Case Management Assessment    Patient name Yusuf Marcial  Location 2 [address] 209/2 [address] 80 MRN [id number]  : 1960 Date 2022       Current Admission Date: 2022  Current Admission Diagnosis:Acute exacerbation of chronic low back pain   Patient Active Problem List    Diagnosis Date Noted    [facility]    Right lower lobe pneumonia 2022    Chronic pain syndrome 2022    Foraminal stenosis of lumbar region 2022    Lumbar post-laminectomy syndrome 2022    Lumbar radiculopathy 2022    Shortness of breath 2022    SIRS (systemic inflammatory response syndrome) ([facility] ) 2022    Dysuria 2021    Peripheral neuropathy 2021    Acute exacerbation of chronic low back pain 2021    BMI 40 0-44 9, adult ([facility] ) 2021    Muscle weakness (generalized) 2021    Platelets decreased ([facility] ) 2021    Medicare annual wellness visit, subsequent 2021    Chronic congestive heart failure ([facility] ) 2020    Severe sepsis ([facility] ) 10/29/2020    Hyperlipidemia 10/29/2020    Nutritional anemia, unspecified  10/29/2020    Chest pain 10/11/2020    Simple chronic bronchitis ([facility] ) 10/11/2020    Atrial fibrillation 2020    Hyperglycemia 2020    Transition of care performed with sharing of clinical summary 2020    Obstructive sleep apnea 2020    Obesity (BMI 30-39 9) 2020    Stage 2 chronic kidney disease 2020    Hyponatremia 2020    COPD (chronic obstructive pulmonary disease) ([facility] ) 2020    Chronic a-fib [facility] ) 2020    H/O vitamin D deficiency 10/28/2019    Dyslipidemia 2019    Type 2 diabetes mellitus with complication, with long-term current use of insulin ([facility] ) 2019    Restrictive ventilatory defect 2019    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 [facility]) 2019    Lung disease, restrictive 11/21/2018    Diabetic ketoacidosis without coma associated with type 2 diabetes mellitus ([facility] ) 11/16/2018    Chronic respiratory failure with hypoxia ([place] [address] ) 10/31/2018    Coronary artery disease 09/06/2017    Esophageal reflux 09/06/2017    ANA LILIA (acute kidney injury) ([place] [address] ) 03/20/2017    Back pain 11/30/2016    SHAVONNE and COPD overlap syndrome      Morbid obesity      Insulin-dependent diabetes mellitus with neuropathy 09/02/2016    Fatigue 09/02/2016    GERD 06/08/2016    Cough 01/13/2016    COPD with exacerbation ([place] [address] ) 01/13/2016    Psychiatric disorder     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/25/2015    Erectile dysfunction of organic origin 08/25/2014    Essential hypertension 09/04/2012    Diabetic neuropathy ([place] [address] ) 09/04/2012    Panic disorder without agoraphobia 09/04/2012    Vitamin D deficiency 09/04/2012      LOS (days): 0  Geometric Mean LOS (GMLOS) (days):   Days to GMLOS:     OBJECTIVE:     Current admission status: Observation  Referral Reason: Rehab    Preferred Pharmacy:   [facility], [address] [facility] [address]  Phone: [phone] Fax: [phone]    Primary Care Provider: Mishel Deal MD    Primary Insurance: MEDICARE  Secondary Insurance:     ASSESSMENT:  [address], [address] [place] [address]   Primary Phone: [phone] (Mobile)               Advance Directives  Does patient have a [address]?: Yes  Does patient have Advance Directives?: Yes  Advance Directives: Living will, Power of  for health care (copies placed in scan bin for chart)  Primary Contact: Pako NAZARIO assisted pt in completing advanced directive paperwork  Original and copies given to pt  Copies also placed in scan bin for chart

## 2022-09-29 NOTE — CASE MANAGEMENT
Case Management Discharge Planning Note    Patient name Nerissa Contreras  Location 2 [address] 209/2 [address] 80 MRN [id number]  : 1960 Date 2022       Current Admission Date: 2022  Current Admission Diagnosis:Acute exacerbation of chronic low back pain   Patient Active Problem List    Diagnosis Date Noted    CLL (chronic lymphocytic leukemia) ([facility] ) 2022    Right lower lobe pneumonia 2022    Chronic pain syndrome 2022    Foraminal stenosis of lumbar region 2022    Lumbar post-laminectomy syndrome 2022    Lumbar radiculopathy 2022    Shortness of breath 2022    SIRS (systemic inflammatory response syndrome) ([facility] ) 2022    Dysuria 2021    Peripheral neuropathy 2021    Acute exacerbation of chronic low back pain 2021    BMI 40 0-44 9, adult ([facility] 2021    Muscle weakness (generalized) 2021    Platelets decreased ([facility] ) 2021    Medicare annual wellness visit, subsequent 2021    Chronic congestive heart failure ([facility] ) 2020    Severe sepsis ([facility] ) 10/29/2020    Hyperlipidemia 10/29/2020    Nutritional anemia, unspecified  10/29/2020    Chest pain 10/11/2020    Simple chronic bronchitis ([facility] ) 10/11/2020    Atrial fibrillation 2020    Hyperglycemia 2020    Transition of care performed with sharing of clinical summary 2020    Obstructive sleep apnea 2020    Obesity (BMI 30-39 9) 2020    Stage 2 chronic kidney disease 2020    Hyponatremia 2020    COPD (chronic obstructive pulmonary disease) ([facility] ) 2020    Chronic a-fib ([facility] ) 2020    H/O vitamin D deficiency 10/28/2019    Dyslipidemia 2019    Type 2 diabetes mellitus with complication, with long-term current use of insulin ([facility] ) 2019    Restrictive ventilatory defect 2019    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 [facility]) 2019  Lung disease, restrictive 11/21/2018    Diabetic ketoacidosis without coma associated with type 2 diabetes mellitus ([place] [address] ) 11/16/2018    Chronic respiratory failure with hypoxia ([place] [address] ) 10/31/2018    Coronary artery disease 09/06/2017    Esophageal reflux 09/06/2017    ANA LILIA (acute kidney injury) ([place] [address] ) 03/20/2017    Back pain 11/30/2016    SHAVONNE and COPD overlap syndrome      Morbid obesity      Insulin-dependent diabetes mellitus with neuropathy 09/02/2016    Fatigue 09/02/2016    GERD 06/08/2016    Cough 01/13/2016    COPD with exacerbation ([place] [address] ) 01/13/2016    Psychiatric disorder     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/25/2015    Erectile dysfunction of organic origin 08/25/2014    Essential hypertension 09/04/2012    Diabetic neuropathy ([place] [address] ) 09/04/2012    Panic disorder without agoraphobia 09/04/2012    Vitamin D deficiency 09/04/2012      LOS (days): 0  Geometric Mean LOS (GMLOS) (days):   Days to GMLOS:     OBJECTIVE:     Current admission status: Observation   Preferred Pharmacy:   [facility] [address] [place] [address]  Phone: [phone] Fax: [phone]    Primary Care Provider: Keiko Jain MD    Primary Insurance: MEDICARE  Secondary Insurance:     DISCHARGE DETAILS:    Discharge planning discussed with[de-identified] Patient  Freedom of Choice: Yes  Comments - Freedom of Choice: Discharge planned  Pt will be transferred to [facility] for skilled rehab as pt requested  Per pt he has his POC with him so wheelchair [facility] transport will be possible      Other Referral/Resources/Interventions Provided:  Interventions: Short Term Rehab  Referral Comments: STR at [facility] is planned    Treatment Team Recommendation: Short Term Rehab  Discharge Destination Plan[de-identified] Short Term Rehab  Transport at Discharge : Wheelchair van  Dispatcher Contacted: Yes  Number/Name of Dispatcher: Shelly Everett and Unit #): Derick  ETA of Transport (Date): 09/29/22  ETA of Transport (Time): 2030      Accepting Facility Name, [address] : [facility] [place] [place]  Receiving Facility/Agency Phone Number: [phone]

## 2022-09-29 NOTE — DISCHARGE INSTRUCTIONS
Back Pain   WHAT YOU NEED TO KNOW:   Back pain is common  You may have back pain and muscle spasms  You may feel sore or stiff on one or both sides of your back  The pain may spread to your lower body  Conditions that affect the spine, joints, or muscles can cause back pain  These may include arthritis, spinal stenosis (narrowing of the spinal column), muscle tension, or breakdown of the spinal discs  DISCHARGE INSTRUCTIONS:   Call your local emergency number (911 in the [address] [facility]) if:   You have severe back pain with chest pain  You cannot control your urine or bowel movements  Your pain becomes so severe that you cannot walk  Return to the emergency department if:   You have pain, numbness, or weakness in one or both legs  You have severe back pain, nausea, and vomiting  You have severe back pain that spreads to your side or genital area  Call your doctor if:   You have back pain that does not get better with rest and pain medicine  You have a fever  You have pain that worsens when you are on your back or when you rest     You have pain that worsens when you cough or sneeze  You lose weight without trying  You have questions or concerns about your condition or care  Medicines: You may need any of the following:  NSAIDs , such as ibuprofen, help decrease swelling, pain, and fever  This medicine is available with or without a doctor's order  NSAIDs can cause stomach bleeding or kidney problems in certain people  If you take blood thinner medicine, always ask your healthcare provider if NSAIDs are safe for you  Always read the medicine label and follow directions  Acetaminophen  decreases pain and fever  It is available without a doctor's order  Ask how much to take and how often to take it  Follow directions   Read the labels of all other medicines you are using to see if they also contain acetaminophen, or ask your doctor or pharmacist  Acetaminophen can cause liver damage if not taken correctly  Do not use more than 4 grams (4,000 milligrams) total of acetaminophen in one day  Muscle relaxers  help decrease muscle spasms and back pain  Prescription pain medicine  may be given  Ask your healthcare provider how to take this medicine safely  Some prescription pain medicines contain acetaminophen  Do not take other medicines that contain acetaminophen without talking to your healthcare provider  Too much acetaminophen may cause liver damage  Prescription pain medicine may cause constipation  Ask your healthcare provider how to prevent or treat constipation  Take your medicine as directed  Contact your healthcare provider if you think your medicine is not helping or if you have side effects  Tell him or her if you are allergic to any medicine  Keep a list of the medicines, vitamins, and herbs you take  Include the amounts, and when and why you take them  Bring the list or the pill bottles to follow-up visits  Carry your medicine list with you in case of an emergency  How to manage your back pain:   Apply ice  on your back for 15 to 20 minutes every hour or as directed  Use an ice pack, or put crushed ice in a plastic bag  Cover it with a towel before you apply it to your skin  Ice helps prevent tissue damage and decreases pain  Apply heat  on your back for 20 to 30 minutes every 2 hours for as many days as directed  Heat helps decrease pain and muscle spasms  Stay active  as much as you can without causing more pain  Bed rest could make your back pain worse  Avoid heavy lifting until your pain is gone  Go to physical therapy as directed  A physical therapist can teach you exercises to help improve movement and strength, and to decrease pain  Follow up with your doctor in 2 weeks, or as directed: You might need to see a specialist  Write down your questions so you remember to ask them during your visits    © Copyright [employer] 2022 Information is for End User's use only and may not be sold, redistributed or otherwise used for commercial purposes  All illustrations and images included in CareNotes® are the copyrighted property of A D A M , Inc  or [employer]  The above information is an  only  It is not intended as medical advice for individual conditions or treatments  Talk to your doctor, nurse or pharmacist before following any medical regimen to see if it is safe and effective for you

## 2022-09-29 NOTE — ASSESSMENT & PLAN NOTE
Lab Results   Component Value Date    HGBA1C 8 6 (H) 03/25/2022       Recent Labs     09/28/22 2109   POCGLU 129       Blood Sugar Average: Last 72 hrs:  (P) 129     Patient on basaglar 60 units and sliding scale with meals at home   Check HgA1c   Diabetic diet   Continue basaglar 60 units BID   Start SSI and accuchecks ac, hs

## 2022-09-30 ENCOUNTER — TRANSITIONAL CARE MANAGEMENT (OUTPATIENT)
Dept: FAMILY MEDICINE CLINIC | Facility: CLINIC | Age: 62
End: 2022-09-30

## 2022-09-30 NOTE — NURSING NOTE
IV removed, all questions and concerns addressed  Pt  Left with his belongings via [employer] in wheelchair   MARY ANN Guzman Called report to receiving facility

## 2022-10-11 PROBLEM — E11.10 DIABETIC KETOACIDOSIS WITHOUT COMA ASSOCIATED WITH TYPE 2 DIABETES MELLITUS (HCC): Status: RESOLVED | Noted: 2018-11-16 | Resolved: 2022-10-11

## 2022-10-11 PROBLEM — A41.9 SEVERE SEPSIS (HCC): Status: RESOLVED | Noted: 2020-10-29 | Resolved: 2022-10-11

## 2022-10-11 PROBLEM — R65.20 SEVERE SEPSIS (HCC): Status: RESOLVED | Noted: 2020-10-29 | Resolved: 2022-10-11

## 2022-10-11 PROBLEM — J18.9 RIGHT LOWER LOBE PNEUMONIA: Status: RESOLVED | Noted: 2022-06-17 | Resolved: 2022-10-11

## 2022-10-12 PROBLEM — Z00.00 MEDICARE ANNUAL WELLNESS VISIT, SUBSEQUENT: Status: RESOLVED | Noted: 2021-02-28 | Resolved: 2022-10-12

## 2022-10-24 ENCOUNTER — TELEPHONE (OUTPATIENT)
Dept: OBGYN CLINIC | Facility: HOSPITAL | Age: 62
End: 2022-10-24

## 2022-10-24 NOTE — TELEPHONE ENCOUNTER
Caller: Patient     Doctor:Enriqueta    Reason for call: patient sick with Bronchitis, will call to petra    Call back#: [phone]

## 2022-10-27 ENCOUNTER — TELEPHONE (OUTPATIENT)
Dept: OBGYN CLINIC | Facility: HOSPITAL | Age: 62
End: 2022-10-27

## 2022-10-30 ENCOUNTER — APPOINTMENT (EMERGENCY)
Dept: RADIOLOGY | Facility: HOSPITAL | Age: 62
End: 2022-10-30

## 2022-10-30 ENCOUNTER — HOSPITAL ENCOUNTER (INPATIENT)
Facility: HOSPITAL | Age: 62
LOS: 4 days | Discharge: HOME HEALTH | End: 2022-11-03
Attending: EMERGENCY MEDICINE | Admitting: INTERNAL MEDICINE

## 2022-10-30 DIAGNOSIS — E66.01 MORBID OBESITY (HCC): ICD-10-CM

## 2022-10-30 DIAGNOSIS — Z79.4 DIABETES MELLITUS DUE TO UNDERLYING CONDITION WITH DIABETIC AUTONOMIC NEUROPATHY, WITH LONG-TERM CURRENT USE OF INSULIN (HCC): ICD-10-CM

## 2022-10-30 DIAGNOSIS — J44.1 COPD WITH ACUTE EXACERBATION (HCC): Primary | ICD-10-CM

## 2022-10-30 DIAGNOSIS — I50.43 ACUTE ON CHRONIC COMBINED SYSTOLIC AND DIASTOLIC CONGESTIVE HEART FAILURE (HCC): ICD-10-CM

## 2022-10-30 DIAGNOSIS — E08.43 DIABETES MELLITUS DUE TO UNDERLYING CONDITION WITH DIABETIC AUTONOMIC NEUROPATHY, WITH LONG-TERM CURRENT USE OF INSULIN (HCC): ICD-10-CM

## 2022-10-30 DIAGNOSIS — C91.10 CLL (CHRONIC LYMPHOCYTIC LEUKEMIA) (HCC): ICD-10-CM

## 2022-10-30 DIAGNOSIS — E11.65 TYPE 2 DIABETES MELLITUS WITH HYPERGLYCEMIA, WITH LONG-TERM CURRENT USE OF INSULIN (HCC): ICD-10-CM

## 2022-10-30 DIAGNOSIS — N18.2 STAGE 2 CHRONIC KIDNEY DISEASE: ICD-10-CM

## 2022-10-30 DIAGNOSIS — J96.21 ACUTE ON CHRONIC RESPIRATORY FAILURE WITH HYPOXIA (HCC): ICD-10-CM

## 2022-10-30 DIAGNOSIS — Z79.4 TYPE 2 DIABETES MELLITUS WITH HYPERGLYCEMIA, WITH LONG-TERM CURRENT USE OF INSULIN (HCC): ICD-10-CM

## 2022-10-30 DIAGNOSIS — E87.1 HYPONATREMIA: ICD-10-CM

## 2022-10-30 DIAGNOSIS — E11.65 UNCONTROLLED TYPE 2 DIABETES MELLITUS WITH HYPERGLYCEMIA (HCC): Chronic | ICD-10-CM

## 2022-10-30 LAB
2HR DELTA HS TROPONIN: 0 NG/L
ALBUMIN SERPL BCP-MCNC: 3.5 G/DL (ref 3.5–5)
ALP SERPL-CCNC: 53 U/L (ref 46–116)
ALT SERPL W P-5'-P-CCNC: 20 U/L (ref 12–78)
ANION GAP SERPL CALCULATED.3IONS-SCNC: 10 MMOL/L (ref 4–13)
APTT PPP: 32 SECONDS (ref 23–37)
BASE EX.OXY STD BLDV CALC-SCNC: 78.3 % (ref 60–80)
BASE EXCESS BLDV CALC-SCNC: -0.8 MMOL/L
BASOPHILS # BLD MANUAL: 0 THOUSAND/UL (ref 0–0.1)
BASOPHILS NFR MAR MANUAL: 0 % (ref 0–1)
BILIRUB SERPL-MCNC: 1.2 MG/DL (ref 0.2–1)
BUN SERPL-MCNC: 24 MG/DL (ref 5–25)
CALCIUM SERPL-MCNC: 8.9 MG/DL (ref 8.3–10.1)
CARDIAC TROPONIN I PNL SERPL HS: 13 NG/L
CARDIAC TROPONIN I PNL SERPL HS: 13 NG/L
CHLORIDE SERPL-SCNC: 90 MMOL/L (ref 96–108)
CO2 SERPL-SCNC: 24 MMOL/L (ref 21–32)
CREAT SERPL-MCNC: 1.09 MG/DL (ref 0.6–1.3)
EOSINOPHIL # BLD MANUAL: 0 THOUSAND/UL (ref 0–0.4)
EOSINOPHIL NFR BLD MANUAL: 0 % (ref 0–6)
ERYTHROCYTE [DISTWIDTH] IN BLOOD BY AUTOMATED COUNT: 13.1 % (ref 11.6–15.1)
FLUAV RNA RESP QL NAA+PROBE: NEGATIVE
FLUBV RNA RESP QL NAA+PROBE: NEGATIVE
GFR SERPL CREATININE-BSD FRML MDRD: 71 ML/MIN/1.73SQ M
GLUCOSE SERPL-MCNC: 243 MG/DL (ref 65–140)
HCO3 BLDV-SCNC: 24.1 MMOL/L (ref 24–30)
HCT VFR BLD AUTO: 36 % (ref 36.5–49.3)
HGB BLD-MCNC: 12.4 G/DL (ref 12–17)
INR PPP: 1.43 (ref 0.84–1.19)
LACTATE SERPL-SCNC: 1.7 MMOL/L (ref 0.5–2)
LYMPHOCYTES # BLD AUTO: 30 % (ref 14–44)
LYMPHOCYTES # BLD AUTO: 6.05 THOUSAND/UL (ref 0.6–4.47)
MAGNESIUM SERPL-MCNC: 1.7 MG/DL (ref 1.6–2.6)
MCH RBC QN AUTO: 31.8 PG (ref 26.8–34.3)
MCHC RBC AUTO-ENTMCNC: 34.4 G/DL (ref 31.4–37.4)
MCV RBC AUTO: 92 FL (ref 82–98)
MONOCYTES # BLD AUTO: 0.81 THOUSAND/UL (ref 0–1.22)
MONOCYTES NFR BLD: 4 % (ref 4–12)
NEUTROPHILS # BLD MANUAL: 12.92 THOUSAND/UL (ref 1.85–7.62)
NEUTS BAND NFR BLD MANUAL: 8 % (ref 0–8)
NEUTS SEG NFR BLD AUTO: 56 % (ref 43–75)
NT-PROBNP SERPL-MCNC: 510 PG/ML
O2 CT BLDV-SCNC: 14.3 ML/DL
PCO2 BLDV: 40.8 MM HG (ref 42–50)
PH BLDV: 7.39 [PH] (ref 7.3–7.4)
PLATELET # BLD AUTO: 164 THOUSANDS/UL (ref 149–390)
PLATELET BLD QL SMEAR: ADEQUATE
PMV BLD AUTO: 8.9 FL (ref 8.9–12.7)
PO2 BLDV: 44.8 MM HG (ref 35–45)
POTASSIUM SERPL-SCNC: 4.3 MMOL/L (ref 3.5–5.3)
PROT SERPL-MCNC: 7.1 G/DL (ref 6.4–8.4)
PROTHROMBIN TIME: 17.6 SECONDS (ref 11.6–14.5)
RBC # BLD AUTO: 3.9 MILLION/UL (ref 3.88–5.62)
RBC MORPH BLD: NORMAL
RSV RNA RESP QL NAA+PROBE: NEGATIVE
SARS-COV-2 RNA RESP QL NAA+PROBE: NEGATIVE
SODIUM SERPL-SCNC: 124 MMOL/L (ref 135–147)
VARIANT LYMPHS # BLD AUTO: 2 %
WBC # BLD AUTO: 20.18 THOUSAND/UL (ref 4.31–10.16)

## 2022-10-30 RX ORDER — IPRATROPIUM BROMIDE AND ALBUTEROL SULFATE 2.5; .5 MG/3ML; MG/3ML
3 SOLUTION RESPIRATORY (INHALATION) ONCE
Status: COMPLETED | OUTPATIENT
Start: 2022-10-30 | End: 2022-10-30

## 2022-10-30 RX ORDER — ALBUTEROL SULFATE 2.5 MG/3ML
10 SOLUTION RESPIRATORY (INHALATION) ONCE
Status: COMPLETED | OUTPATIENT
Start: 2022-10-30 | End: 2022-10-30

## 2022-10-30 RX ORDER — METHYLPREDNISOLONE SODIUM SUCCINATE 125 MG/2ML
60 INJECTION, POWDER, LYOPHILIZED, FOR SOLUTION INTRAMUSCULAR; INTRAVENOUS ONCE
Status: COMPLETED | OUTPATIENT
Start: 2022-10-30 | End: 2022-10-30

## 2022-10-30 RX ORDER — CEFEPIME HYDROCHLORIDE 2 G/50ML
2000 INJECTION, SOLUTION INTRAVENOUS ONCE
Status: COMPLETED | OUTPATIENT
Start: 2022-10-30 | End: 2022-10-30

## 2022-10-30 RX ORDER — SODIUM CHLORIDE 9 MG/ML
50 INJECTION, SOLUTION INTRAVENOUS CONTINUOUS
Status: DISCONTINUED | OUTPATIENT
Start: 2022-10-30 | End: 2022-10-31

## 2022-10-30 RX ADMIN — SODIUM CHLORIDE 50 ML/HR: 0.9 INJECTION, SOLUTION INTRAVENOUS at 22:34

## 2022-10-30 RX ADMIN — CEFEPIME HYDROCHLORIDE 2000 MG: 2 INJECTION, SOLUTION INTRAVENOUS at 21:39

## 2022-10-30 RX ADMIN — ALBUTEROL SULFATE 10 MG: 2.5 SOLUTION RESPIRATORY (INHALATION) at 22:41

## 2022-10-30 RX ADMIN — IPRATROPIUM BROMIDE AND ALBUTEROL SULFATE 3 ML: 2.5; .5 SOLUTION RESPIRATORY (INHALATION) at 21:32

## 2022-10-30 RX ADMIN — IPRATROPIUM BROMIDE AND ALBUTEROL SULFATE 3 ML: 2.5; .5 SOLUTION RESPIRATORY (INHALATION) at 21:31

## 2022-10-30 RX ADMIN — METHYLPREDNISOLONE SODIUM SUCCINATE 60 MG: 125 INJECTION, POWDER, FOR SOLUTION INTRAMUSCULAR; INTRAVENOUS at 21:27

## 2022-10-31 ENCOUNTER — TELEPHONE (OUTPATIENT)
Dept: FAMILY MEDICINE CLINIC | Facility: CLINIC | Age: 62
End: 2022-10-31

## 2022-10-31 PROBLEM — I50.33 ACUTE ON CHRONIC DIASTOLIC CONGESTIVE HEART FAILURE (HCC): Status: ACTIVE | Noted: 2020-11-17

## 2022-10-31 PROBLEM — I50.43 ACUTE ON CHRONIC COMBINED SYSTOLIC AND DIASTOLIC CONGESTIVE HEART FAILURE (HCC): Status: ACTIVE | Noted: 2020-11-17

## 2022-10-31 LAB
4HR DELTA HS TROPONIN: 0 NG/L
ANION GAP SERPL CALCULATED.3IONS-SCNC: 11 MMOL/L (ref 4–13)
BACTERIA UR QL AUTO: NORMAL /HPF
BILIRUB UR QL STRIP: NEGATIVE
BUN SERPL-MCNC: 22 MG/DL (ref 5–25)
CALCIUM SERPL-MCNC: 9.8 MG/DL (ref 8.3–10.1)
CARDIAC TROPONIN I PNL SERPL HS: 13 NG/L
CHLORIDE SERPL-SCNC: 91 MMOL/L (ref 96–108)
CLARITY UR: CLEAR
CO2 SERPL-SCNC: 24 MMOL/L (ref 21–32)
COLOR UR: YELLOW
CREAT SERPL-MCNC: 1.37 MG/DL (ref 0.6–1.3)
DIGOXIN SERPL-MCNC: 0.7 NG/ML (ref 0.8–2)
GFR SERPL CREATININE-BSD FRML MDRD: 54 ML/MIN/1.73SQ M
GLUCOSE SERPL-MCNC: 184 MG/DL (ref 65–140)
GLUCOSE SERPL-MCNC: 184 MG/DL (ref 65–140)
GLUCOSE SERPL-MCNC: 194 MG/DL (ref 65–140)
GLUCOSE SERPL-MCNC: 218 MG/DL (ref 65–140)
GLUCOSE SERPL-MCNC: 294 MG/DL (ref 65–140)
GLUCOSE SERPL-MCNC: 331 MG/DL (ref 65–140)
GLUCOSE SERPL-MCNC: 359 MG/DL (ref 65–140)
GLUCOSE SERPL-MCNC: 491 MG/DL (ref 65–140)
GLUCOSE SERPL-MCNC: 499 MG/DL (ref 65–140)
GLUCOSE SERPL-MCNC: >500 MG/DL (ref 65–140)
GLUCOSE SERPL-MCNC: >500 MG/DL (ref 65–140)
GLUCOSE UR STRIP-MCNC: ABNORMAL MG/DL
HGB UR QL STRIP.AUTO: NEGATIVE
KETONES UR STRIP-MCNC: NEGATIVE MG/DL
LEUKOCYTE ESTERASE UR QL STRIP: ABNORMAL
MAGNESIUM SERPL-MCNC: 2.3 MG/DL (ref 1.6–2.6)
NITRITE UR QL STRIP: NEGATIVE
NON-SQ EPI CELLS URNS QL MICRO: NORMAL /HPF
OSMOLALITY UR/SERPL-RTO: 275 MMOL/KG (ref 282–298)
OSMOLALITY UR: 229 MMOL/KG
PH UR STRIP.AUTO: 6 [PH]
POTASSIUM SERPL-SCNC: 4.7 MMOL/L (ref 3.5–5.3)
PROCALCITONIN SERPL-MCNC: 0.35 NG/ML
PROCALCITONIN SERPL-MCNC: 0.39 NG/ML
PROT UR STRIP-MCNC: NEGATIVE MG/DL
RBC #/AREA URNS AUTO: NORMAL /HPF
SODIUM 24H UR-SCNC: 26 MOL/L
SODIUM SERPL-SCNC: 126 MMOL/L (ref 135–147)
SP GR UR STRIP.AUTO: <=1.005 (ref 1–1.03)
T4 FREE SERPL-MCNC: 1.26 NG/DL (ref 0.76–1.46)
TSH SERPL DL<=0.05 MIU/L-ACNC: 0.37 UIU/ML (ref 0.45–4.5)
URATE SERPL-MCNC: 5.1 MG/DL (ref 3.5–8.5)
UROBILINOGEN UR QL STRIP.AUTO: 0.2 E.U./DL
WBC #/AREA URNS AUTO: NORMAL /HPF

## 2022-10-31 RX ORDER — PANTOPRAZOLE SODIUM 40 MG/1
40 TABLET, DELAYED RELEASE ORAL
Refills: 1 | Status: DISCONTINUED | OUTPATIENT
Start: 2022-10-31 | End: 2022-11-03 | Stop reason: HOSPADM

## 2022-10-31 RX ORDER — ASPIRIN 81 MG/1
81 TABLET ORAL DAILY
Status: DISCONTINUED | OUTPATIENT
Start: 2022-10-31 | End: 2022-11-03 | Stop reason: HOSPADM

## 2022-10-31 RX ORDER — INSULIN GLARGINE 100 [IU]/ML
65 INJECTION, SOLUTION SUBCUTANEOUS EVERY 12 HOURS SCHEDULED
Status: DISCONTINUED | OUTPATIENT
Start: 2022-10-31 | End: 2022-10-31

## 2022-10-31 RX ORDER — INSULIN LISPRO 100 [IU]/ML
1-5 INJECTION, SOLUTION INTRAVENOUS; SUBCUTANEOUS
Status: DISCONTINUED | OUTPATIENT
Start: 2022-10-31 | End: 2022-10-31

## 2022-10-31 RX ORDER — GABAPENTIN 300 MG/1
600 CAPSULE ORAL 3 TIMES DAILY
Status: DISCONTINUED | OUTPATIENT
Start: 2022-10-31 | End: 2022-11-03 | Stop reason: HOSPADM

## 2022-10-31 RX ORDER — GUAIFENESIN 600 MG/1
1200 TABLET, EXTENDED RELEASE ORAL 2 TIMES DAILY
Status: DISCONTINUED | OUTPATIENT
Start: 2022-10-31 | End: 2022-11-03 | Stop reason: HOSPADM

## 2022-10-31 RX ORDER — INSULIN LISPRO 100 [IU]/ML
8 INJECTION, SOLUTION INTRAVENOUS; SUBCUTANEOUS
Status: DISCONTINUED | OUTPATIENT
Start: 2022-10-31 | End: 2022-10-31

## 2022-10-31 RX ORDER — FLUTICASONE FUROATE AND VILANTEROL 100; 25 UG/1; UG/1
1 POWDER RESPIRATORY (INHALATION) DAILY
Refills: 0 | Status: DISCONTINUED | OUTPATIENT
Start: 2022-10-31 | End: 2022-11-03 | Stop reason: HOSPADM

## 2022-10-31 RX ORDER — LOSARTAN POTASSIUM 50 MG/1
50 TABLET ORAL DAILY
Status: DISCONTINUED | OUTPATIENT
Start: 2022-10-31 | End: 2022-11-03 | Stop reason: HOSPADM

## 2022-10-31 RX ORDER — METOPROLOL SUCCINATE 100 MG/1
200 TABLET, EXTENDED RELEASE ORAL DAILY
Status: DISCONTINUED | OUTPATIENT
Start: 2022-10-31 | End: 2022-11-03 | Stop reason: HOSPADM

## 2022-10-31 RX ORDER — POTASSIUM CHLORIDE 20 MEQ/1
20 TABLET, EXTENDED RELEASE ORAL DAILY
Status: DISCONTINUED | OUTPATIENT
Start: 2022-10-31 | End: 2022-11-03 | Stop reason: HOSPADM

## 2022-10-31 RX ORDER — LEVALBUTEROL INHALATION SOLUTION 0.63 MG/3ML
0.63 SOLUTION RESPIRATORY (INHALATION) EVERY 4 HOURS PRN
Status: DISCONTINUED | OUTPATIENT
Start: 2022-10-31 | End: 2022-11-03 | Stop reason: HOSPADM

## 2022-10-31 RX ORDER — FUROSEMIDE 10 MG/ML
40 INJECTION INTRAMUSCULAR; INTRAVENOUS DAILY
Status: DISCONTINUED | OUTPATIENT
Start: 2022-10-31 | End: 2022-11-01

## 2022-10-31 RX ORDER — QUETIAPINE FUMARATE 100 MG/1
300 TABLET, FILM COATED ORAL
Status: DISCONTINUED | OUTPATIENT
Start: 2022-10-31 | End: 2022-11-03 | Stop reason: HOSPADM

## 2022-10-31 RX ORDER — DIGOXIN 125 MCG
250 TABLET ORAL DAILY
Status: DISCONTINUED | OUTPATIENT
Start: 2022-10-31 | End: 2022-11-03 | Stop reason: HOSPADM

## 2022-10-31 RX ORDER — ACETAMINOPHEN 325 MG/1
650 TABLET ORAL EVERY 6 HOURS PRN
Status: DISCONTINUED | OUTPATIENT
Start: 2022-10-31 | End: 2022-11-03 | Stop reason: HOSPADM

## 2022-10-31 RX ORDER — CHLORAL HYDRATE 500 MG
1000 CAPSULE ORAL 2 TIMES DAILY
Status: DISCONTINUED | OUTPATIENT
Start: 2022-10-31 | End: 2022-11-03 | Stop reason: HOSPADM

## 2022-10-31 RX ORDER — FUROSEMIDE 10 MG/ML
40 INJECTION INTRAMUSCULAR; INTRAVENOUS DAILY
Status: DISCONTINUED | OUTPATIENT
Start: 2022-10-31 | End: 2022-10-31

## 2022-10-31 RX ORDER — AZITHROMYCIN 250 MG/1
500 TABLET, FILM COATED ORAL EVERY 24 HOURS
Status: DISCONTINUED | OUTPATIENT
Start: 2022-10-31 | End: 2022-10-31

## 2022-10-31 RX ORDER — ALPRAZOLAM 0.5 MG/1
2 TABLET ORAL
Status: DISCONTINUED | OUTPATIENT
Start: 2022-10-31 | End: 2022-11-03 | Stop reason: HOSPADM

## 2022-10-31 RX ORDER — DOXYCYCLINE HYCLATE 100 MG/1
100 CAPSULE ORAL EVERY 12 HOURS SCHEDULED
Status: DISCONTINUED | OUTPATIENT
Start: 2022-10-31 | End: 2022-11-03 | Stop reason: HOSPADM

## 2022-10-31 RX ORDER — CYCLOBENZAPRINE HCL 10 MG
10 TABLET ORAL 3 TIMES DAILY PRN
Status: DISCONTINUED | OUTPATIENT
Start: 2022-10-31 | End: 2022-11-03 | Stop reason: HOSPADM

## 2022-10-31 RX ORDER — METHYLPREDNISOLONE SODIUM SUCCINATE 40 MG/ML
40 INJECTION, POWDER, LYOPHILIZED, FOR SOLUTION INTRAMUSCULAR; INTRAVENOUS EVERY 8 HOURS
Status: DISCONTINUED | OUTPATIENT
Start: 2022-10-31 | End: 2022-11-01

## 2022-10-31 RX ORDER — INSULIN GLARGINE 100 [IU]/ML
50 INJECTION, SOLUTION SUBCUTANEOUS ONCE
Status: COMPLETED | OUTPATIENT
Start: 2022-10-31 | End: 2022-10-31

## 2022-10-31 RX ORDER — ASCORBIC ACID 500 MG
1000 TABLET ORAL DAILY
Status: DISCONTINUED | OUTPATIENT
Start: 2022-10-31 | End: 2022-11-03 | Stop reason: HOSPADM

## 2022-10-31 RX ORDER — LEVALBUTEROL 1.25 MG/.5ML
1.25 SOLUTION, CONCENTRATE RESPIRATORY (INHALATION)
Status: DISCONTINUED | OUTPATIENT
Start: 2022-10-31 | End: 2022-11-03 | Stop reason: HOSPADM

## 2022-10-31 RX ORDER — QUETIAPINE FUMARATE 50 MG/1
100 TABLET, EXTENDED RELEASE ORAL EVERY MORNING
Status: DISCONTINUED | OUTPATIENT
Start: 2022-10-31 | End: 2022-11-03 | Stop reason: HOSPADM

## 2022-10-31 RX ORDER — MELATONIN
1000 DAILY
Status: DISCONTINUED | OUTPATIENT
Start: 2022-10-31 | End: 2022-11-03 | Stop reason: HOSPADM

## 2022-10-31 RX ORDER — MAGNESIUM SULFATE 1 G/100ML
1 INJECTION INTRAVENOUS ONCE
Status: COMPLETED | OUTPATIENT
Start: 2022-10-31 | End: 2022-10-31

## 2022-10-31 RX ORDER — SODIUM CHLORIDE FOR INHALATION 0.9 %
3 VIAL, NEBULIZER (ML) INHALATION
Status: DISCONTINUED | OUTPATIENT
Start: 2022-10-31 | End: 2022-11-03 | Stop reason: HOSPADM

## 2022-10-31 RX ORDER — INSULIN GLARGINE 100 [IU]/ML
65 INJECTION, SOLUTION SUBCUTANEOUS EVERY 12 HOURS SCHEDULED
Refills: 0 | Status: DISCONTINUED | OUTPATIENT
Start: 2022-10-31 | End: 2022-10-31

## 2022-10-31 RX ORDER — ATORVASTATIN CALCIUM 80 MG/1
80 TABLET, FILM COATED ORAL
Status: DISCONTINUED | OUTPATIENT
Start: 2022-10-31 | End: 2022-11-03 | Stop reason: HOSPADM

## 2022-10-31 RX ORDER — INSULIN LISPRO 100 [IU]/ML
6 INJECTION, SOLUTION INTRAVENOUS; SUBCUTANEOUS
Status: DISCONTINUED | OUTPATIENT
Start: 2022-10-31 | End: 2022-10-31

## 2022-10-31 RX ORDER — BUSPIRONE HYDROCHLORIDE 10 MG/1
10 TABLET ORAL 2 TIMES DAILY
Status: DISCONTINUED | OUTPATIENT
Start: 2022-10-31 | End: 2022-11-03 | Stop reason: HOSPADM

## 2022-10-31 RX ORDER — OMEGA-3-ACID ETHYL ESTERS 1 G/1
2 CAPSULE, LIQUID FILLED ORAL DAILY
Refills: 3 | Status: DISCONTINUED | OUTPATIENT
Start: 2022-10-31 | End: 2022-10-31 | Stop reason: CLARIF

## 2022-10-31 RX ADMIN — CYCLOBENZAPRINE HYDROCHLORIDE 10 MG: 10 TABLET, FILM COATED ORAL at 22:14

## 2022-10-31 RX ADMIN — INSULIN GLARGINE 65 UNITS: 100 INJECTION, SOLUTION SUBCUTANEOUS at 08:14

## 2022-10-31 RX ADMIN — GABAPENTIN 600 MG: 300 CAPSULE ORAL at 08:18

## 2022-10-31 RX ADMIN — INSULIN GLARGINE 50 UNITS: 100 INJECTION, SOLUTION SUBCUTANEOUS at 01:00

## 2022-10-31 RX ADMIN — SODIUM CHLORIDE 15 UNITS/HR: 9 INJECTION, SOLUTION INTRAVENOUS at 10:20

## 2022-10-31 RX ADMIN — BUSPIRONE HYDROCHLORIDE 10 MG: 10 TABLET ORAL at 08:18

## 2022-10-31 RX ADMIN — APIXABAN 5 MG: 5 TABLET, FILM COATED ORAL at 00:59

## 2022-10-31 RX ADMIN — SERTRALINE HYDROCHLORIDE 50 MG: 50 TABLET ORAL at 00:59

## 2022-10-31 RX ADMIN — OXYCODONE HYDROCHLORIDE AND ACETAMINOPHEN 1000 MG: 500 TABLET ORAL at 08:13

## 2022-10-31 RX ADMIN — INSULIN LISPRO 5 UNITS: 100 INJECTION, SOLUTION INTRAVENOUS; SUBCUTANEOUS at 08:01

## 2022-10-31 RX ADMIN — INSULIN LISPRO 8 UNITS: 100 INJECTION, SOLUTION INTRAVENOUS; SUBCUTANEOUS at 08:02

## 2022-10-31 RX ADMIN — MAGNESIUM SULFATE HEPTAHYDRATE 1 G: 1 INJECTION, SOLUTION INTRAVENOUS at 01:08

## 2022-10-31 RX ADMIN — GUAIFENESIN 1200 MG: 600 TABLET, EXTENDED RELEASE ORAL at 17:11

## 2022-10-31 RX ADMIN — APIXABAN 5 MG: 5 TABLET, FILM COATED ORAL at 17:11

## 2022-10-31 RX ADMIN — CYCLOBENZAPRINE HYDROCHLORIDE 10 MG: 10 TABLET, FILM COATED ORAL at 08:14

## 2022-10-31 RX ADMIN — IPRATROPIUM BROMIDE 0.5 MG: 0.5 SOLUTION RESPIRATORY (INHALATION) at 07:42

## 2022-10-31 RX ADMIN — IPRATROPIUM BROMIDE 0.5 MG: 0.5 SOLUTION RESPIRATORY (INHALATION) at 19:53

## 2022-10-31 RX ADMIN — ALPRAZOLAM 2 MG: 0.5 TABLET ORAL at 22:06

## 2022-10-31 RX ADMIN — ASPIRIN 81 MG: 81 TABLET, COATED ORAL at 08:13

## 2022-10-31 RX ADMIN — Medication 1 TABLET: at 08:14

## 2022-10-31 RX ADMIN — QUETIAPINE FUMARATE 300 MG: 100 TABLET ORAL at 22:06

## 2022-10-31 RX ADMIN — FUROSEMIDE 40 MG: 10 INJECTION, SOLUTION INTRAMUSCULAR; INTRAVENOUS at 12:23

## 2022-10-31 RX ADMIN — APIXABAN 5 MG: 5 TABLET, FILM COATED ORAL at 08:18

## 2022-10-31 RX ADMIN — GABAPENTIN 600 MG: 300 CAPSULE ORAL at 17:11

## 2022-10-31 RX ADMIN — CYCLOBENZAPRINE HYDROCHLORIDE 10 MG: 10 TABLET, FILM COATED ORAL at 17:44

## 2022-10-31 RX ADMIN — LEVALBUTEROL HYDROCHLORIDE 1.25 MG: 1.25 SOLUTION, CONCENTRATE RESPIRATORY (INHALATION) at 07:42

## 2022-10-31 RX ADMIN — LEVALBUTEROL HYDROCHLORIDE 1.25 MG: 1.25 SOLUTION, CONCENTRATE RESPIRATORY (INHALATION) at 19:53

## 2022-10-31 RX ADMIN — PANTOPRAZOLE SODIUM 40 MG: 40 TABLET, DELAYED RELEASE ORAL at 05:06

## 2022-10-31 RX ADMIN — FLUTICASONE FUROATE AND VILANTEROL TRIFENATATE 1 PUFF: 100; 25 POWDER RESPIRATORY (INHALATION) at 08:19

## 2022-10-31 RX ADMIN — QUETIAPINE FUMARATE 100 MG: 50 TABLET, EXTENDED RELEASE ORAL at 08:19

## 2022-10-31 RX ADMIN — LOSARTAN POTASSIUM 50 MG: 50 TABLET, FILM COATED ORAL at 08:13

## 2022-10-31 RX ADMIN — QUETIAPINE FUMARATE 300 MG: 100 TABLET ORAL at 00:59

## 2022-10-31 RX ADMIN — GUAIFENESIN 1200 MG: 600 TABLET, EXTENDED RELEASE ORAL at 08:13

## 2022-10-31 RX ADMIN — OMEGA-3 FATTY ACIDS CAP 1000 MG 1000 MG: 1000 CAP at 17:11

## 2022-10-31 RX ADMIN — METOPROLOL SUCCINATE 200 MG: 100 TABLET, EXTENDED RELEASE ORAL at 08:14

## 2022-10-31 RX ADMIN — ALPRAZOLAM 2 MG: 0.5 TABLET ORAL at 00:57

## 2022-10-31 RX ADMIN — ISODIUM CHLORIDE 3 ML: 0.03 SOLUTION RESPIRATORY (INHALATION) at 13:30

## 2022-10-31 RX ADMIN — Medication 1000 UNITS: at 08:13

## 2022-10-31 RX ADMIN — ISODIUM CHLORIDE 3 ML: 0.03 SOLUTION RESPIRATORY (INHALATION) at 19:53

## 2022-10-31 RX ADMIN — BUSPIRONE HYDROCHLORIDE 10 MG: 10 TABLET ORAL at 00:59

## 2022-10-31 RX ADMIN — DOXYCYCLINE 100 MG: 100 CAPSULE ORAL at 00:59

## 2022-10-31 RX ADMIN — SODIUM CHLORIDE 18 UNITS/HR: 9 INJECTION, SOLUTION INTRAVENOUS at 18:22

## 2022-10-31 RX ADMIN — METHYLPREDNISOLONE SODIUM SUCCINATE 40 MG: 40 INJECTION, POWDER, FOR SOLUTION INTRAMUSCULAR; INTRAVENOUS at 05:49

## 2022-10-31 RX ADMIN — FUROSEMIDE 40 MG: 10 INJECTION, SOLUTION INTRAMUSCULAR; INTRAVENOUS at 01:00

## 2022-10-31 RX ADMIN — LEVALBUTEROL HYDROCHLORIDE 1.25 MG: 1.25 SOLUTION, CONCENTRATE RESPIRATORY (INHALATION) at 13:30

## 2022-10-31 RX ADMIN — IPRATROPIUM BROMIDE 0.5 MG: 0.5 SOLUTION RESPIRATORY (INHALATION) at 13:30

## 2022-10-31 RX ADMIN — INSULIN LISPRO 2 UNITS: 100 INJECTION, SOLUTION INTRAVENOUS; SUBCUTANEOUS at 01:05

## 2022-10-31 RX ADMIN — GABAPENTIN 600 MG: 300 CAPSULE ORAL at 00:59

## 2022-10-31 RX ADMIN — DOXYCYCLINE 100 MG: 100 CAPSULE ORAL at 22:06

## 2022-10-31 RX ADMIN — BUSPIRONE HYDROCHLORIDE 10 MG: 10 TABLET ORAL at 17:11

## 2022-10-31 RX ADMIN — OMEGA-3 FATTY ACIDS CAP 1000 MG 1000 MG: 1000 CAP at 08:14

## 2022-10-31 RX ADMIN — SERTRALINE HYDROCHLORIDE 50 MG: 50 TABLET ORAL at 08:18

## 2022-10-31 RX ADMIN — DIGOXIN 250 MCG: 125 TABLET ORAL at 08:14

## 2022-10-31 RX ADMIN — METHYLPREDNISOLONE SODIUM SUCCINATE 40 MG: 40 INJECTION, POWDER, FOR SOLUTION INTRAMUSCULAR; INTRAVENOUS at 13:34

## 2022-10-31 RX ADMIN — ISODIUM CHLORIDE 3 ML: 0.03 SOLUTION RESPIRATORY (INHALATION) at 07:42

## 2022-10-31 RX ADMIN — POTASSIUM CHLORIDE 20 MEQ: 1500 TABLET, EXTENDED RELEASE ORAL at 08:13

## 2022-10-31 RX ADMIN — ATORVASTATIN CALCIUM 80 MG: 80 TABLET, FILM COATED ORAL at 17:11

## 2022-10-31 RX ADMIN — METHYLPREDNISOLONE SODIUM SUCCINATE 40 MG: 40 INJECTION, POWDER, FOR SOLUTION INTRAMUSCULAR; INTRAVENOUS at 22:08

## 2022-10-31 RX ADMIN — GABAPENTIN 600 MG: 300 CAPSULE ORAL at 22:08

## 2022-10-31 RX ADMIN — DOXYCYCLINE 100 MG: 100 CAPSULE ORAL at 08:18

## 2022-10-31 NOTE — ED PROCEDURE NOTE
PROCEDURE  ECG 12 Lead Documentation Only    Date/Time: 10/30/2022 8:47 PM  Performed by: Kina Elliott DO  Authorized by: Kina Elliott DO     ECG reviewed by me, the ED Provider: yes    Patient location:  ED  Interpretation:     Interpretation: abnormal    Quality:     Tracing quality:  Limited by artifact  Rate:     ECG rate:  114    ECG rate assessment: tachycardic    Rhythm:     Rhythm: atrial fibrillation    Ectopy:     Ectopy: none    ST segments:     ST segments:  Normal  T waves:     T waves: normal           Kina Elliott DO  10/30/22 2047

## 2022-10-31 NOTE — ASSESSMENT & PLAN NOTE
On Trelegy, albuterol inhaler and nebulizer p r n  at home  · Received IV Solu-Medrol 60 mg in ED, continue Solu-Medrol 40 q 8 hours  · Started on doxycycline in view of wet cough( avoiding Zithromax due to interaction with digoxin)  · Mucinex  · Xopenex+ Atrovent t i d , Xopenex p r n    · SubstituteTrelegy with Irma Marmolejo  · Consult Pulmonary

## 2022-10-31 NOTE — ASSESSMENT & PLAN NOTE
On Eliquis, digoxin, Toprol at home  · EKG showed uncontrolled AFib, rate 114 her prior provider     · Uncontrolled AFib likely secondary to using albuterol nebulizer every 4 hours today at home  · digoxin level 0 7  · Continue home medications  · Avoid albuterol neb  · Optimize electrolytes  · Telemetry

## 2022-10-31 NOTE — ASSESSMENT & PLAN NOTE
History of bipolar, depression with anxiety      Continue Zoloft, Seroquel, BuSpar, Xanax at home  · Verified at [facility] website by prior provider

## 2022-10-31 NOTE — ASSESSMENT & PLAN NOTE
Lab Results   Component Value Date    EGFR 54 10/31/2022    EGFR 71 10/30/2022    EGFR 96 09/29/2022    CREATININE 1 37 (H) 10/31/2022    CREATININE 1 09 10/30/2022    CREATININE 0 78 09/29/2022     Baseline creatinine appears to be around 0 7-1 0  · Creatinine mildly higher today  · Monitor

## 2022-10-31 NOTE — ASSESSMENT & PLAN NOTE
Follows hematology as outpatient  Currently under surveillance  · WBC 20 18 on admission  · Repeat lab work in a m

## 2022-10-31 NOTE — ASSESSMENT & PLAN NOTE
History of bipolar, depression with anxiety  On Zoloft, Seroquel, BuSpar, Xanax at home    · Continue home medications  · Verified at [facility] website
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
## 2022-10-31 NOTE — ASSESSMENT & PLAN NOTE
On Trelegy, albuterol inhaler and nebulizer p r n  at home  · Received IV Solu-Medrol 60 mg in ED, we continue Solu-Medrol 40 q 8 hours  · Start doxycycline in view of wet cough( avoiding Zithromax due to interaction with digoxin)  · Mucinex  · Xopenex+ Atrovent t i d , Xopenex p r n    · [place] [facility]  · [place] Pulmonary
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
## 2022-10-31 NOTE — ASSESSMENT & PLAN NOTE
Lab Results   Component Value Date    HGBA1C 9 2 (H) 09/29/2022       Recent Labs     10/31/22  0027 10/31/22  0714   POCGLU 218* >500*     On Basaglar 60 units subQ b i d , NovoLog SSI, metformin, Victoza at home    · Discontinue Lantus, Humalog as blood sugars continue to remain persistently elevated  · Start non DKA insulin drip and monitor blood sugars  · Hold metformin and Victoza

## 2022-10-31 NOTE — ASSESSMENT & PLAN NOTE
Patient presented with worsening SOB and wet cough since Friday  Denied fever, chills, runny nose, sore throat  Reports using oxygen 3L chronically at home, use BiPAP 12/5 with oxygen 3L at bedtime  · Currently on oxygen 3L  · COVID-19/flu/RSV negative  · Chest x-ray shows vascular congestion to me, pending final read    · ProBNP 510  · Suspect multifactorial secondary to acute on chronic CHF, COPD exacerbation  · Received one hour neb, DuoNebs, Solu-Medrol 60mg, cefepime in ED  · Treat underlying causes as below

## 2022-10-31 NOTE — ASSESSMENT & PLAN NOTE
Wt Readings from Last 3 Encounters:   10/31/22 119 kg (262 lb 12 6 oz)   09/28/22 126 kg (277 lb 3 2 oz)   07/28/22 118 kg (260 lb)     Cardiology Telemedicine note in July 2022 indicated patient is to use torsemide intermittently and use daily if weight gain  · Patient reports taking Demadex daily at home, but unsure dose  · 2D echo in 2/2022 showed EF 55%, dilated atriums, no significant valvular disease    · ProBNP, chest x-ray as above  · Continue Lasix 40 mg IV daily  · Cardiac diet, fluid restriction 1500 mL per day  · Daily weight, I&Os  · Consult Cardiology

## 2022-10-31 NOTE — ASSESSMENT & PLAN NOTE
Wt Readings from Last 3 Encounters:   10/31/22 119 kg (262 lb 12 6 oz)   09/28/22 126 kg (277 lb 3 2 oz)   07/28/22 118 kg (260 lb)     Cardiology Telemedicine note in July 2022 indicated patient is to use torsemide intermittently and use daily if weight gain  · Patient reports not on diuretic at home   · 2D echo in 2/2022 showed EF 55%, dilated atriums, no significant valvular disease    · Trace edema to lower extremity on exam  · ProBNP, chest x-ray as above  · Will start Lasix 40 mg IV daily  · Cardiac diet, fluid restriction 1500 mL per day  · Daily weight, I&Os  · Consult Cardiology
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
## 2022-10-31 NOTE — ASSESSMENT & PLAN NOTE
Sodium 124  · Corrected sodium 127  · Patient appears fluid overloaded on exam   · IV Lasix as above  · Fluid restriction 1500ml per day  · Check uric acid, urine sodium, urine Osmo, serum osmol, TSH, free T4  · Repeat BMP in AM

## 2022-10-31 NOTE — PROGRESS NOTES
Kit U  65   Progress Note Keke Lucas 1960, 60 y o  male MRN: [id number]  Unit/Bed#: [address] [place] [address] Encounter: [id number]  Primary Care Provider: Melquiades Granados MD   Date and time admitted to hospital: 10/30/2022  8:26 PM    * Acute on chronic respiratory failure [facility])  Assessment & Plan  Patient presented with worsening SOB and wet cough since Friday  Denied fever, chills, runny nose, sore throat  Reports using oxygen 3L chronically at home, use BiPAP 12/5 with oxygen 3L at bedtime  · Currently on oxygen 3L  · COVID-19/flu/RSV negative  · Chest x-ray shows vascular congestion to me, pending final read  · ProBNP 510  · Suspect multifactorial secondary to acute on chronic CHF, COPD exacerbation  · Received one hour neb, DuoNebs, Solu-Medrol 60mg, cefepime in ED  · Treat underlying causes as below    COPD with exacerbation ([facility] [address] )  Assessment & Plan  On Trelegy, albuterol inhaler and nebulizer p r n  at home  · Received IV Solu-Medrol 60 mg in ED, continue Solu-Medrol 40 q 8 hours  · Started on doxycycline in view of wet cough( avoiding Zithromax due to interaction with digoxin)  · Mucinex  · Xopenex+ Atrovent t i d , Xopenex p r n  · SubstituteTrelegy with Shantanu Barrios  · Consult Pulmonary    Acute on chronic diastolic congestive heart failure [facility])  Assessment & Plan  Wt Readings from Last 3 Encounters:   10/31/22 119 kg (262 lb 12 6 oz)   09/28/22 126 kg (277 lb 3 2 oz)   07/28/22 118 kg (260 lb)     Cardiology Telemedicine note in July 2022 indicated patient is to use torsemide intermittently and use daily if weight gain  · Patient reports taking Demadex daily at home, but unsure dose  · 2D echo in 2/2022 showed EF 55%, dilated atriums, no significant valvular disease    · ProBNP, chest x-ray as above  · Continue Lasix 40 mg IV daily  · Cardiac diet, fluid restriction 1500 mL per day  · Daily weight, I&Os  · Consult Cardiology            Type 2 diabetes mellitus with complication, with long-term current use of insulin [facility])  Assessment & Plan  Lab Results   Component Value Date    HGBA1C 9 2 (H) 09/29/2022       Recent Labs     10/31/22  0027 10/31/22  0714   POCGLU 218* >500*     On Basaglar 60 units subQ b i d , NovoLog SSI, metformin, Victoza at home  · Discontinue Lantus, Humalog as blood sugars continue to remain persistently elevated  · Start non DKA insulin drip and monitor blood sugars  · Hold metformin and Victoza    CLL (chronic lymphocytic leukemia) ([facility] [address] )  Assessment & Plan  Follows hematology as outpatient  Currently under surveillance  · WBC 20 18 on admission  · Repeat lab work in a m  Chronic a-fib (HCC)  Assessment & Plan  On Eliquis, digoxin, Toprol at home  · EKG showed uncontrolled AFib, rate 114 her prior provider  · Uncontrolled AFib likely secondary to using albuterol nebulizer every 4 hours today at home  · digoxin level 0 7  · Continue home medications  · Avoid albuterol neb  · Optimize electrolytes  · Telemetry    Hyponatremia  Assessment & Plan  Sodium 124 --> 126 today  · Partly hyponatremia from hyperglycemia  · IV Lasix as above  · Fluid restriction 1500ml per day  · uric acid normal  urine sodium, urine Osmo, serum osmol pending   · TSH low, free T4 pending  · Repeat BMP in AM    Stage 2 chronic kidney disease  Assessment & Plan  Lab Results   Component Value Date    EGFR 54 10/31/2022    EGFR 71 10/30/2022    EGFR 96 09/29/2022    CREATININE 1 37 (H) 10/31/2022    CREATININE 1 09 10/30/2022    CREATININE 0 78 09/29/2022     Baseline creatinine appears to be around 0 7-1 0  · Creatinine mildly higher today  · Monitor    Dyslipidemia  Assessment & Plan  Continue statin  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 [facility])  Assessment & Plan  Body mass index is 36 65 kg/m²    Diet and lifestyle modification    Essential hypertension  Assessment & Plan  On losartan, Toprol at home which is being continued  · BPs stable overall    Chronic low back pain  Assessment & Plan  Continue Neurontin, Flexeril p r n    Esophageal reflux  Assessment & Plan  Continue PPI  Coronary artery disease  Assessment & Plan  Denies history of cardiac stents  · Continue Lopressor, aspirin, Lipitor    SHAVONNE and COPD overlap syndrome   Assessment & Plan  Continue BiPAP , with oxygen at bedtime    Psychiatric disorder  Assessment & Plan  History of bipolar, depression with anxiety  Continue Zoloft, Seroquel, BuSpar, Xanax at home  · Verified at [facility] website by prior provider        VTE Pharmacologic Prophylaxis: VTE Score: 6 High Risk (Score >/= 5) - Pharmacological DVT Prophylaxis Ordered: apixaban (Eliquis)  Sequential Compression Devices Ordered  Patient Centered Rounds: I performed bedside rounds with nursing staff today  Discussions with Specialists or Other Care Team Provider: yes- cardio    Education and Discussions with Family / Patient: Patient declined call to   Time Spent for Care: 30 minutes  More than 50% of total time spent on counseling and coordination of care as described above  Current Length of Stay: 1 day(s)  Current Patient Status: Inpatient   Certification Statement: The patient will continue to require additional inpatient hospital stay due to COPD/CHF exacerbation  Discharge Plan: Anticipate discharge in 48-72 hrs to home  Code Status: Level 3 - DNAR and DNI    Subjective:     Patient states breathing is slightly better compared to yesterday    Objective:     Vitals:   Temp (24hrs), Av 2 °F (36 8 °C), Min:97 6 °F (36 4 °C), Max:98 8 °F (37 1 °C)    Temp:  [97 6 °F (36 4 °C)-98 8 °F (37 1 °C)] 97 6 °F (36 4 °C)  HR:  [] 103  Resp:  [20-28] 20  BP: (118-176)/(61-97) 154/73  SpO2:  [90 %-97 %] 93 %  Body mass index is 36 65 kg/m²  Input and Output Summary (last 24 hours):      Intake/Output Summary (Last 24 hours) at 10/31/2022 0734  Last data filed at 10/31/2022 0300  Gross per 24 hour   Intake 508 33 ml   Output 1150 ml   Net -641 67 ml       Physical Exam:   Physical Exam  Vitals reviewed  Constitutional:       General: He is not in acute distress  Appearance: He is not ill-appearing  HENT:      Head: Normocephalic and atraumatic  Eyes:      General:         Right eye: No discharge  Left eye: No discharge  Extraocular Movements: Extraocular movements intact  Cardiovascular:      Rate and Rhythm: Normal rate  Rhythm irregular  Pulmonary:      Effort: Pulmonary effort is normal  No respiratory distress  Breath sounds: Wheezing present  No rales  Comments: Decreased breath sounds bilaterally  Abdominal:      General: Bowel sounds are normal  There is no distension  Palpations: Abdomen is soft  Tenderness: There is no abdominal tenderness  Musculoskeletal:      Right lower leg: No edema  Left lower leg: No edema  Neurological:      Mental Status: He is alert and oriented to person, place, and time           Additional Data:     Labs:  Results from last 7 days   Lab Units 10/30/22  2053   WBC Thousand/uL 20 18*   HEMOGLOBIN g/dL 12 4   HEMATOCRIT % 36 0*   PLATELETS Thousands/uL 164   BANDS PCT % 8   LYMPHO PCT % 30   MONO PCT % 4   EOS PCT % 0     Results from last 7 days   Lab Units 10/31/22  0528 10/30/22  2053   SODIUM mmol/L 126* 124*   POTASSIUM mmol/L 4 7 4 3   CHLORIDE mmol/L 91* 90*   CO2 mmol/L 24 24   BUN mg/dL 22 24   CREATININE mg/dL 1 37* 1 09   ANION GAP mmol/L 11 10   CALCIUM mg/dL 9 8 8 9   ALBUMIN g/dL  --  3 5   TOTAL BILIRUBIN mg/dL  --  1 20*   ALK PHOS U/L  --  53   ALT U/L  --  20   GLUCOSE RANDOM mg/dL 491* 243*     Results from last 7 days   Lab Units 10/30/22  2053   INR  1 43*     Results from last 7 days   Lab Units 10/31/22  0714 10/31/22  0027   POC GLUCOSE mg/dl >500* 218*         Results from last 7 days   Lab Units 10/31/22  0528 10/30/22  2129 10/30/22  2053   LACTIC ACID mmol/L  --  1 7  -- PROCALCITONIN ng/ml 0 35*  --  0 39*       Lines/Drains:  Invasive Devices  Report    Peripheral Intravenous Line  Duration           Peripheral IV 10/31/22 Dorsal (posterior); Right Hand <1 day                  Telemetry:  Telemetry Orders (From admission, onward)             48 Hour Telemetry Monitoring  Continuous x 48 hours        References:    Telemetry Guidelines   Question:  Reason for 48 Hour Telemetry  Answer:  Acute Decompensated CHF (continuous diuretic infusion or total diuretic dose > 200 mg daily, associated electrolyte derangement, ionotropic drip, history of ventricular arrhythmia, or new EF <35%)                 Telemetry Reviewed: Afib  Indication for Continued Telemetry Use: Arrthymias requiring medical therapy             Imaging: Personally reviewed the following imaging: chest xray    Recent Cultures (last 7 days):   Results from last 7 days   Lab Units 10/30/22  2130 10/30/22  2129   BLOOD CULTURE  Received in Microbiology Lab  Culture in Progress  Received in Microbiology Lab  Culture in Progress         Last 24 Hours Medication List:   Current Facility-Administered Medications   Medication Dose Route Frequency Provider Last Rate   • acetaminophen  650 mg Oral Q6H PRN MANAS Yoder     • ALPRAZolam  2 mg Oral HS MANAS Yoder     • apixaban  5 mg Oral BID MANAS Yoder     • vitamin C  1,000 mg Oral Daily MANAS Yoder     • aspirin  81 mg Oral Daily MANAS Yoder     • atorvastatin  80 mg Oral Daily With MANSA Arnold     • busPIRone  10 mg Oral BID MANAS Yoder     • cholecalciferol  1,000 Units Oral Daily MANAS Yoder     • cyclobenzaprine  10 mg Oral TID PRN MANAS Yoder     • digoxin  250 mcg Oral Daily MANAS Yoder     • doxycycline hyclate  100 mg Oral Q12H [address] MANAS Yoder     • fish oil  1,000 mg Oral BID MANAS Yoder     • Fluticasone Furoate-Vilanterol  1 puff Inhalation Daily MANAS Zabala     • furosemide  40 mg Intravenous Daily MANAS Yoder     • gabapentin  600 mg Oral TID MANAS Yoder     • guaiFENesin  1,200 mg Oral BID MANAS Yoder     • insulin glargine  65 Units Subcutaneous Q12H [facility] MANAS Yoder     • insulin lispro  1-5 Units Subcutaneous TID AC MANAS Yoder     • insulin lispro  1-5 Units Subcutaneous HS MANAS Yoder     • insulin lispro  8 Units Subcutaneous TID With Meals MANAS Yoder     • ipratropium  0 5 mg Nebulization TID MANAS Yoder     • levalbuterol  0 63 mg Nebulization Q4H PRN MANAS Yoder     • levalbuterol  1 25 mg Nebulization TID MANAS Yoder      And   • sodium chloride  3 mL Nebulization TID MANAS Yoder     • losartan  50 mg Oral Daily MANAS Yoder     • methylPREDNISolone sodium succinate  40 mg Intravenous Q8H MANAS Yoder     • metoprolol succinate  200 mg Oral Daily MANAS Yoder     • multivitamin-minerals  1 tablet Oral Daily MANAS Yoder     • pantoprazole  40 mg Oral Early Morning MANAS Yoder     • potassium chloride  20 mEq Oral Daily MANAS Yoder     • QUEtiapine  100 mg Oral QAM MANAS Yoder     • QUEtiapine  300 mg Oral HS MANAS Yoder     • sertraline  50 mg Oral Daily MANAS Yoder          Today, Patient Was Seen By: Viry Horta    **Please Note: This note may have been constructed using a voice recognition system  **

## 2022-10-31 NOTE — ASSESSMENT & PLAN NOTE
Lab Results   Component Value Date    EGFR 71 10/30/2022    EGFR 96 09/29/2022    EGFR 101 09/28/2022    CREATININE 1 09 10/30/2022    CREATININE 0 78 09/29/2022    CREATININE 0 69 09/28/2022     Baseline creatinine appears to be around 0 7-1 0  · Creatinine stable  · Monitor

## 2022-10-31 NOTE — ASSESSMENT & PLAN NOTE
On Eliquis, digoxin, metoprolol at home  · EKG showed uncontrolled AFib, rate 114     · Uncontrolled AFib likely secondary to using albuterol nebulizer every 4 hours today at home  · Check digoxin level  · Continue home medications  · Avoid albuterol  neb  · Optimize electrolytes  · Telemetry

## 2022-10-31 NOTE — PLAN OF CARE
Problem: Potential for Falls  Goal: Patient will remain free of falls  Description: INTERVENTIONS:  - Educate patient/family on patient safety including physical limitations  - Instruct patient to call for assistance with activity   - Consult OT/PT to assist with strengthening/mobility   - Keep Call bell within reach  - Keep bed low and locked with side rails adjusted as appropriate  - Keep care items and personal belongings within reach  - Initiate and maintain comfort rounds  - Make Fall Risk Sign visible to staff  - Offer Toileting every 2 Hours, in advance of need  - Initiate/Maintain bed alarm  - Obtain necessary fall risk management equipment:   - Apply yellow socks and bracelet for high fall risk patients  - Consider moving patient to room near nurses station  Outcome: Progressing     Problem: MOBILITY - ADULT  Goal: Maintains/Returns to pre admission functional level  Description: INTERVENTIONS:  - Perform BMAT or MOVE assessment daily    - Set and communicate daily mobility goal to care team and patient/family/caregiver  - Collaborate with rehabilitation services on mobility goals if consulted  - Perform Range of Motion 3  times a day    - Actively turns self  - Dangle patient 3 times a day  - Stand patient 3 times a day  - Ambulate patient 3 times a day  - Out of bed to chair 3 times a day   - Out of bed for meals 3 times a day  - Out of bed for toileting  - Record patient progress and toleration of activity level   Outcome: Progressing     Problem: PAIN - ADULT  Goal: Verbalizes/displays adequate comfort level or baseline comfort level  Description: Interventions:  - Encourage patient to monitor pain and request assistance  - Assess pain using appropriate pain scale  - Administer analgesics based on type and severity of pain and evaluate response  - Implement non-pharmacological measures as appropriate and evaluate response  - Consider cultural and social influences on pain and pain management  - Notify physician/advanced practitioner if interventions unsuccessful or patient reports new pain  Outcome: Progressing     Problem: DISCHARGE PLANNING  Goal: Discharge to home or other facility with appropriate resources  Description: INTERVENTIONS:  - Identify barriers to discharge w/patient and caregiver  - Arrange for needed discharge resources and transportation as appropriate  - Identify discharge learning needs (meds, wound care, etc )  - Arrange for interpretive services to assist at discharge as needed  - Refer to Case Management Department for coordinating discharge planning if the patient needs post-hospital services based on physician/advanced practitioner order or complex needs related to functional status, cognitive ability, or social support system  Outcome: Progressing     Problem: Knowledge Deficit  Goal: Patient/family/caregiver demonstrates understanding of disease process, treatment plan, medications, and discharge instructions  Description: Complete learning assessment and assess knowledge base    Interventions:  - Provide teaching at level of understanding  - Provide teaching via preferred learning methods  Outcome: Progressing     Problem: RESPIRATORY - ADULT  Goal: Achieves optimal ventilation and oxygenation  Description: INTERVENTIONS:  - Assess for changes in respiratory status  - Assess for changes in mentation and behavior  - Position to facilitate oxygenation and minimize respiratory effort  - Oxygen administered by appropriate delivery if ordered  - Initiate smoking cessation education as indicated  - Encourage broncho-pulmonary hygiene including cough, deep breathe, Incentive Spirometry  - Assess the need for suctioning and aspirate as needed  - Assess and instruct to report SOB or any respiratory difficulty  - Respiratory Therapy support as indicated  Outcome: Progressing     Problem: METABOLIC, FLUID AND ELECTROLYTES - ADULT  Goal: Electrolytes maintained within normal limits  Description: INTERVENTIONS:  - Monitor labs and assess patient for signs and symptoms of electrolyte imbalances  - Administer electrolyte replacement as ordered  - Monitor response to electrolyte replacements, including repeat lab results as appropriate  - Instruct patient on fluid and nutrition as appropriate  Outcome: Progressing     Problem: METABOLIC, FLUID AND ELECTROLYTES - ADULT  Goal: Fluid balance maintained  Description: INTERVENTIONS:  - Monitor labs   - Monitor I/O and WT  - Instruct patient on fluid and nutrition as appropriate  - Assess for signs & symptoms of volume excess or deficit  Outcome: Progressing     Problem: METABOLIC, FLUID AND ELECTROLYTES - ADULT  Goal: Glucose maintained within target range  Description: INTERVENTIONS:  - Monitor Blood Glucose as ordered  - Assess for signs and symptoms of hyperglycemia and hypoglycemia  - Administer ordered medications to maintain glucose within target range  - Assess nutritional intake and initiate nutrition service referral as needed  Outcome: Progressing     Problem: MUSCULOSKELETAL - ADULT  Goal: Maintain or return mobility to safest level of function  Description: INTERVENTIONS:  - Assess patient's ability to carry out ADLs; assess patient's baseline for ADL function and identify physical deficits which impact ability to perform ADLs (bathing, care of mouth/teeth, toileting, grooming, dressing, etc )  - Assess/evaluate cause of self-care deficits   - Assess range of motion  - Assess patient's mobility  - Assess patient's need for assistive devices and provide as appropriate  - Encourage maximum independence but intervene and supervise when necessary  - Involve family in performance of ADLs  - Assess for home care needs following discharge   - Consider OT consult to assist with ADL evaluation and planning for discharge  - Provide patient education as appropriate  Outcome: Progressing     Problem: MUSCULOSKELETAL - ADULT  Goal: Maintain proper alignment of affected body part  Description: INTERVENTIONS:  - Support, maintain and protect limb and body alignment  - Provide patient/ family with appropriate education  Outcome: Progressing

## 2022-10-31 NOTE — ASSESSMENT & PLAN NOTE
Patient presents with worsening SOB and wet cough since Friday  Denies fever, chills, runny nose, sore throat  Reports using oxygen 3L chronically at home, use BiPAP 12/5 with oxygen 3L at bedtime  · Currently on oxygen 4L, satting low 90s  · COVID-19/flu/RSV negative  · Chest x-ray shows vascular congestion to me, pending final read  · ProBNP 510, trace edema on lower extremity on exam  · Scattered wheezing on auscultation  · Suspect multifactorial secondary to acute on chronic CHF, COPD exacerbation  · Received one hour neb, Anahi, Solu-Medrol 60mg, cefepime in ED  · Treat underlying causes as below  · Continue oxygen 4L, wean to baseline as tolerated

## 2022-10-31 NOTE — ASSESSMENT & PLAN NOTE
Lab Results   Component Value Date    HGBA1C 9 2 (H) 09/29/2022       Recent Labs     10/31/22  0027   POCGLU 218*       Blood Sugar Average: Last 72 hrs: On Basaglar 60 units subQ b i d , NovoLog SSI, metformin, Victoza at home  · Will order Lantus 65 units subQ b i d  in view of steroid use  · Humalog 6 units t i d   With meals  · Hold metformin and Victoza  · SSI
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
## 2022-10-31 NOTE — ASSESSMENT & PLAN NOTE
Sodium 124 --> 126 today  · Partly hyponatremia from hyperglycemia  · IV Lasix as above  · Fluid restriction 1500ml per day  · uric acid normal  urine sodium, urine Osmo, serum osmol pending   · TSH low, free T4 pending  · Repeat BMP in AM

## 2022-10-31 NOTE — CASE MANAGEMENT
Case Management Assessment & Discharge Planning Note    Patient name Freddy Hoffman  Location [address] [facility]/[address] [place] 5-* MRN [id number]  : 1960 Date 10/31/2022       Current Admission Date: 10/30/2022  Current Admission Diagnosis:Acute on chronic respiratory failure [facility])   Patient Active Problem List    Diagnosis Date Noted   • CLL (chronic lymphocytic leukemia) ([facility] ) 2022   • Chronic pain syndrome 2022   • Foraminal stenosis of lumbar region 2022   • Lumbar post-laminectomy syndrome 2022   • Lumbar radiculopathy 2022   • Shortness of breath 2022   • SIRS (systemic inflammatory response syndrome) ([facility] ) 2022   • Dysuria 2021   • Peripheral neuropathy 2021   • Acute exacerbation of chronic low back pain 2021   • BMI 40 0-44 9, adult ([facility] ) 2021   • Muscle weakness (generalized) 2021   • Platelets decreased ([place] [address] ) 2021   • Acute on chronic diastolic congestive heart failure ([facility] ) 2020   • Hyperlipidemia 10/29/2020   • Nutritional anemia, unspecified  10/29/2020   • Chest pain 10/11/2020   • Simple chronic bronchitis ([facility] ) 10/11/2020   • Hyperglycemia 2020   • Transition of care performed with sharing of clinical summary 2020   • Obstructive sleep apnea 2020   • Obesity (BMI 30-39 9) 2020   • Stage 2 chronic kidney disease 2020   • Hyponatremia 2020   • COPD (chronic obstructive pulmonary disease) ([facility] ) 2020   • Chronic a-fib ([facility] ) 2020   • H/O vitamin D deficiency 10/28/2019   • Dyslipidemia 2019   • Type 2 diabetes mellitus with complication, with long-term current use of insulin ([facility] ) 2019   • Restrictive ventilatory defect 2019   • 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 ) 10/31/2018   • Coronary artery disease 09/06/2017   • Esophageal reflux 09/06/2017   • ANA LILIA (acute kidney injury) ([facility] ) 03/20/2017   • Chronic low back pain 11/30/2016   • SHAVONNE and COPD overlap syndrome     • Morbid obesity     • Insulin-dependent diabetes mellitus with neuropathy 09/02/2016   • Fatigue 09/02/2016   • GERD 06/08/2016   • COPD with exacerbation ([facility] ) 01/13/2016   • Psychiatric disorder    • 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/25/2015   • Erectile dysfunction of organic origin 08/25/2014   • Essential hypertension 09/04/2012   • Diabetic neuropathy ([facility] ) 09/04/2012   • Panic disorder without agoraphobia 09/04/2012   • Vitamin D deficiency 09/04/2012      LOS (days): 1  Geometric Mean LOS (GMLOS) (days):   Days to GMLOS:     OBJECTIVE:    Risk of Unplanned Readmission Score: 48 24      Current admission status: Inpatient     Preferred Pharmacy: [address] [place] [address]    Primary Care Provider: Bo Wilburn MD    Primary Insurance: [facility]  Secondary Insurance: [address] [place] [address]    ASSESSMENT:  [address], [address] [place] [address]   Primary Phone: [phone] (Mobile)               Advance Directives  Does patient have a [address]?: Yes  Does patient have Advance Directives?: Yes  Advance Directives: Living will, Power of  for health care  Primary Contact: Mireyaalec Mcmanus     Readmission Root Cause  30 Day Readmission: No    Patient Information  Admitted from[de-identified] Home  Mental Status: Alert  During Assessment patient was accompanied by: Not accompanied during assessment  Assessment information provided by[de-identified] Patient  Primary Caregiver: Self  Support Systems: Friend, Home care staff  [place] of Residence: [address] [place] [address] do you live in?: Phan Pratt 45 entry access options   Select all that apply : Elevator  Type of Current Residence: Apartment  Floor Level: 2  Upon entering residence, is there a bedroom on the main floor (no further steps)?: Yes  Upon entering residence, is there a bathroom on the main floor (no further steps)?: Yes  In the last 12 months, was there a time when you were not able to pay the mortgage or rent on time?: No  In the last 12 months, how many places have you lived?: 1  In the last 12 months, was there a time when you did not have a steady place to sleep or slept in a shelter (including now)?: No  Homeless/housing insecurity resource given?: N/A  Living Arrangements: Lives Alone  Is patient a ?: No    Activities of Daily Living Prior to Admission  Functional Status: Independent  Completes ADLs independently?: No  Level of ADL dependence: Assistance  Ambulates independently?: Yes  Does patient use assisted devices?: Yes  Assisted Devices (DME) used: Home Oxygen concentrator, Portable Oxygen concentrator, BiPAP, [address], Nebulizer  DME Company Name (respiratory supplies):  [employer]  O2 Rate(s): 3L  Does patient currently own DME?: Yes  What DME does the patient currently own?: Home Oxygen concentrator, Portable Oxygen concentrator, Straight Cane, Nebulizer, Walker, Other (Comment) (electric scooter, raised toilet seat w/ handles)  Does patient have a history of Outpatient Therapy (PT/OT)?: No  Does the patient have a history of Short-Term Rehab?: Yes (Hx at [facility])  Does patient have a history of HHC?: Yes (Hx with [facility])  Does patient currently have [facility]?: Yes    Current Home Health Care  Type of Current Home Care Services: Home health aide ([facility] (20hrs/week))  Current Home Health Follow-Up Provider[de-identified] PCP    Patient Information Continued  Income Source: SSI/SSD  Does patient have prescription coverage?: Yes (Pt uses [facility] [address] w/ home delivery; denies issues with OOP costs/coverage)  Within the past 12 months, you worried that your food would run out before you got the money to buy more : Never true  Within the past 12 months, the food you bought just didn't last and you didn't have money to get more : Never true  Food insecurity resource given?: N/A  Does patient receive dialysis treatments?: No     Means of Transportation  Means of Transport to Appts[de-identified] Penny Valenzuela  In the past 12 months, has lack of transportation kept you from medical appointments or from getting medications?: No  In the past 12 months, has lack of transportation kept you from meetings, work, or from getting things needed for daily living?: No  Was application for public transport provided?: N/A    DISCHARGE DETAILS:    Discharge planning discussed with[de-identified] Patient  Freedom of Choice: Yes  Comments - Freedom of Choice: SW met bedside with patient and also spoke via phone with friend/POA Brandi to introduce role, complete assessment, and dicusss discharge planning needs  Patient reports that despite COPD Exacerbation he feels he is close to his functional baseline  SW advised of outstanding PT/OT evaluations  Patient states he is not anticipating to require STR this admission and is motivated towards returning home w/ continued non-skilled [facility] services  Patient requesting a referral for skilled HHC (SN, PT, OT)  SW reviewed FOC  Patient consenting to [facility] referrals to agencies serving his residence to determine whom in in-network with insurance and has best available [facility]  Patient nor friend/POA can identify any other questions, concerns, or needs SW can assist with at this time       CM contacted family/caregiver?: Yes  Were Treatment Team discharge recommendations reviewed with patient/caregiver?: Yes  Did patient/caregiver verbalize understanding of patient care needs?: Yes  Were patient/caregiver advised of the risks associated with not following Treatment Team discharge recommendations?: Yes    Contacts  Patient Contacts: Toby Angulo (friend/POA)  Relationship to Patient[de-identified] Friend  Contact Method: Phone  Phone Number: [phone]  Reason/Outcome: Emergency Contact, Discharge [address]         Is the patient interested in [address] at discharge?: Yes  [address] requested[de-identified] Nursing, Physical Therapy, Occupational [address] Name[de-identified] Other  [address] [place] [address] Provider[de-identified] PCP  Home Health Services Needed[de-identified] Evaluate Functional Status and Safety, Gait/ADL Training, COPD Management, Strengthening/Theraputic Exercises to Improve Function  Oxygen LPM Ordered (if applicable based on home health services needed):: 3 LPM  Homebound Criteria Met[de-identified] Requires the Assistance of Another Person for Safe Ambulation or to Leave the Home  Supporting Clincal Findings[de-identified] Dyspnea with Exertion, Requires Oxygen, Fatigues Easliy in [employer], Limited Endurance     Other Referral/Resources/Interventions Provided:  Interventions: [facility]  Referral Comments: [facility] referrals sent in Aidin  Responses pending  Would you like to participate in our [address] service program?  : No - Declined    Treatment Team Recommendation: Home with [facility]  Discharge Destination Plan[de-identified] Home with [place] at Discharge : BLS Ambulance (Patient reports he has his apartment keys   He does not have his POC with him )     IMM Given (Date):: 10/30/22  IMM Given to[de-identified] Patient (Provided by registration)

## 2022-11-01 PROBLEM — E87.1 HYPONATREMIA: Status: RESOLVED | Noted: 2020-02-02 | Resolved: 2022-11-01

## 2022-11-01 LAB
ANION GAP SERPL CALCULATED.3IONS-SCNC: 14 MMOL/L (ref 4–13)
BUN SERPL-MCNC: 31 MG/DL (ref 5–25)
CALCIUM SERPL-MCNC: 10 MG/DL (ref 8.3–10.1)
CHLORIDE SERPL-SCNC: 99 MMOL/L (ref 96–108)
CO2 SERPL-SCNC: 24 MMOL/L (ref 21–32)
CREAT SERPL-MCNC: 1.07 MG/DL (ref 0.6–1.3)
ERYTHROCYTE [DISTWIDTH] IN BLOOD BY AUTOMATED COUNT: 13 % (ref 11.6–15.1)
GFR SERPL CREATININE-BSD FRML MDRD: 73 ML/MIN/1.73SQ M
GLUCOSE SERPL-MCNC: 108 MG/DL (ref 65–140)
GLUCOSE SERPL-MCNC: 114 MG/DL (ref 65–140)
GLUCOSE SERPL-MCNC: 134 MG/DL (ref 65–140)
GLUCOSE SERPL-MCNC: 141 MG/DL (ref 65–140)
GLUCOSE SERPL-MCNC: 157 MG/DL (ref 65–140)
GLUCOSE SERPL-MCNC: 164 MG/DL (ref 65–140)
GLUCOSE SERPL-MCNC: 171 MG/DL (ref 65–140)
GLUCOSE SERPL-MCNC: 187 MG/DL (ref 65–140)
GLUCOSE SERPL-MCNC: 193 MG/DL (ref 65–140)
GLUCOSE SERPL-MCNC: 194 MG/DL (ref 65–140)
GLUCOSE SERPL-MCNC: 212 MG/DL (ref 65–140)
GLUCOSE SERPL-MCNC: 221 MG/DL (ref 65–140)
GLUCOSE SERPL-MCNC: 238 MG/DL (ref 65–140)
GLUCOSE SERPL-MCNC: 296 MG/DL (ref 65–140)
HCT VFR BLD AUTO: 40.1 % (ref 36.5–49.3)
HGB BLD-MCNC: 13.3 G/DL (ref 12–17)
MCH RBC QN AUTO: 30.9 PG (ref 26.8–34.3)
MCHC RBC AUTO-ENTMCNC: 33.2 G/DL (ref 31.4–37.4)
MCV RBC AUTO: 93 FL (ref 82–98)
PLATELET # BLD AUTO: 236 THOUSANDS/UL (ref 149–390)
PMV BLD AUTO: 9.7 FL (ref 8.9–12.7)
POTASSIUM SERPL-SCNC: 4.2 MMOL/L (ref 3.5–5.3)
QRS AXIS: 81 DEGREES
QRSD INTERVAL: 88 MS
QT INTERVAL: 320 MS
QTC INTERVAL: 441 MS
RBC # BLD AUTO: 4.31 MILLION/UL (ref 3.88–5.62)
SODIUM SERPL-SCNC: 137 MMOL/L (ref 135–147)
T WAVE AXIS: 35 DEGREES
VENTRICULAR RATE: 114 BPM
WBC # BLD AUTO: 28.56 THOUSAND/UL (ref 4.31–10.16)

## 2022-11-01 RX ORDER — METHYLPREDNISOLONE SODIUM SUCCINATE 40 MG/ML
40 INJECTION, POWDER, LYOPHILIZED, FOR SOLUTION INTRAMUSCULAR; INTRAVENOUS EVERY 12 HOURS SCHEDULED
Status: DISCONTINUED | OUTPATIENT
Start: 2022-11-01 | End: 2022-11-02

## 2022-11-01 RX ORDER — TORSEMIDE 20 MG/1
20 TABLET ORAL DAILY
Status: DISCONTINUED | OUTPATIENT
Start: 2022-11-02 | End: 2022-11-03 | Stop reason: HOSPADM

## 2022-11-01 RX ORDER — INSULIN GLARGINE 100 [IU]/ML
65 INJECTION, SOLUTION SUBCUTANEOUS EVERY 12 HOURS SCHEDULED
Status: DISCONTINUED | OUTPATIENT
Start: 2022-11-02 | End: 2022-11-02

## 2022-11-01 RX ADMIN — FLUTICASONE FUROATE AND VILANTEROL TRIFENATATE 1 PUFF: 100; 25 POWDER RESPIRATORY (INHALATION) at 09:05

## 2022-11-01 RX ADMIN — IPRATROPIUM BROMIDE 0.5 MG: 0.5 SOLUTION RESPIRATORY (INHALATION) at 19:22

## 2022-11-01 RX ADMIN — OMEGA-3 FATTY ACIDS CAP 1000 MG 1000 MG: 1000 CAP at 17:09

## 2022-11-01 RX ADMIN — LEVALBUTEROL HYDROCHLORIDE 1.25 MG: 1.25 SOLUTION, CONCENTRATE RESPIRATORY (INHALATION) at 07:48

## 2022-11-01 RX ADMIN — DOXYCYCLINE 100 MG: 100 CAPSULE ORAL at 09:04

## 2022-11-01 RX ADMIN — BUSPIRONE HYDROCHLORIDE 10 MG: 10 TABLET ORAL at 09:04

## 2022-11-01 RX ADMIN — OXYCODONE HYDROCHLORIDE AND ACETAMINOPHEN 1000 MG: 500 TABLET ORAL at 09:04

## 2022-11-01 RX ADMIN — METHYLPREDNISOLONE SODIUM SUCCINATE 40 MG: 40 INJECTION, POWDER, FOR SOLUTION INTRAMUSCULAR; INTRAVENOUS at 22:20

## 2022-11-01 RX ADMIN — APIXABAN 5 MG: 5 TABLET, FILM COATED ORAL at 17:09

## 2022-11-01 RX ADMIN — ALPRAZOLAM 2 MG: 0.5 TABLET ORAL at 22:20

## 2022-11-01 RX ADMIN — DOXYCYCLINE 100 MG: 100 CAPSULE ORAL at 22:20

## 2022-11-01 RX ADMIN — CYCLOBENZAPRINE HYDROCHLORIDE 10 MG: 10 TABLET, FILM COATED ORAL at 13:07

## 2022-11-01 RX ADMIN — FUROSEMIDE 40 MG: 10 INJECTION, SOLUTION INTRAMUSCULAR; INTRAVENOUS at 09:04

## 2022-11-01 RX ADMIN — POTASSIUM CHLORIDE 20 MEQ: 1500 TABLET, EXTENDED RELEASE ORAL at 09:04

## 2022-11-01 RX ADMIN — GABAPENTIN 600 MG: 300 CAPSULE ORAL at 22:20

## 2022-11-01 RX ADMIN — ISODIUM CHLORIDE 3 ML: 0.03 SOLUTION RESPIRATORY (INHALATION) at 07:48

## 2022-11-01 RX ADMIN — QUETIAPINE FUMARATE 100 MG: 50 TABLET, EXTENDED RELEASE ORAL at 09:04

## 2022-11-01 RX ADMIN — ISODIUM CHLORIDE 3 ML: 0.03 SOLUTION RESPIRATORY (INHALATION) at 19:23

## 2022-11-01 RX ADMIN — CYCLOBENZAPRINE HYDROCHLORIDE 10 MG: 10 TABLET, FILM COATED ORAL at 22:20

## 2022-11-01 RX ADMIN — GUAIFENESIN 1200 MG: 600 TABLET, EXTENDED RELEASE ORAL at 17:09

## 2022-11-01 RX ADMIN — GUAIFENESIN 1200 MG: 600 TABLET, EXTENDED RELEASE ORAL at 09:03

## 2022-11-01 RX ADMIN — SERTRALINE HYDROCHLORIDE 50 MG: 50 TABLET ORAL at 09:03

## 2022-11-01 RX ADMIN — LEVALBUTEROL HYDROCHLORIDE 1.25 MG: 1.25 SOLUTION, CONCENTRATE RESPIRATORY (INHALATION) at 19:22

## 2022-11-01 RX ADMIN — DIGOXIN 250 MCG: 125 TABLET ORAL at 09:03

## 2022-11-01 RX ADMIN — OMEGA-3 FATTY ACIDS CAP 1000 MG 1000 MG: 1000 CAP at 09:03

## 2022-11-01 RX ADMIN — Medication 1000 UNITS: at 09:03

## 2022-11-01 RX ADMIN — LOSARTAN POTASSIUM 50 MG: 50 TABLET, FILM COATED ORAL at 09:03

## 2022-11-01 RX ADMIN — ASPIRIN 81 MG: 81 TABLET, COATED ORAL at 09:04

## 2022-11-01 RX ADMIN — ISODIUM CHLORIDE 3 ML: 0.03 SOLUTION RESPIRATORY (INHALATION) at 13:23

## 2022-11-01 RX ADMIN — IPRATROPIUM BROMIDE 0.5 MG: 0.5 SOLUTION RESPIRATORY (INHALATION) at 07:48

## 2022-11-01 RX ADMIN — BUSPIRONE HYDROCHLORIDE 10 MG: 10 TABLET ORAL at 17:09

## 2022-11-01 RX ADMIN — GABAPENTIN 600 MG: 300 CAPSULE ORAL at 09:03

## 2022-11-01 RX ADMIN — METHYLPREDNISOLONE SODIUM SUCCINATE 40 MG: 40 INJECTION, POWDER, FOR SOLUTION INTRAMUSCULAR; INTRAVENOUS at 05:44

## 2022-11-01 RX ADMIN — PANTOPRAZOLE SODIUM 40 MG: 40 TABLET, DELAYED RELEASE ORAL at 05:44

## 2022-11-01 RX ADMIN — METOPROLOL SUCCINATE 200 MG: 100 TABLET, EXTENDED RELEASE ORAL at 09:03

## 2022-11-01 RX ADMIN — IPRATROPIUM BROMIDE 0.5 MG: 0.5 SOLUTION RESPIRATORY (INHALATION) at 13:23

## 2022-11-01 RX ADMIN — QUETIAPINE FUMARATE 300 MG: 100 TABLET ORAL at 22:20

## 2022-11-01 RX ADMIN — GABAPENTIN 600 MG: 300 CAPSULE ORAL at 17:09

## 2022-11-01 RX ADMIN — SODIUM CHLORIDE 10 UNITS/HR: 9 INJECTION, SOLUTION INTRAVENOUS at 18:43

## 2022-11-01 RX ADMIN — SODIUM CHLORIDE 12 UNITS/HR: 9 INJECTION, SOLUTION INTRAVENOUS at 12:02

## 2022-11-01 RX ADMIN — SODIUM CHLORIDE 1.5 UNITS/HR: 9 INJECTION, SOLUTION INTRAVENOUS at 01:20

## 2022-11-01 RX ADMIN — APIXABAN 5 MG: 5 TABLET, FILM COATED ORAL at 09:03

## 2022-11-01 RX ADMIN — LEVALBUTEROL HYDROCHLORIDE 1.25 MG: 1.25 SOLUTION, CONCENTRATE RESPIRATORY (INHALATION) at 13:23

## 2022-11-01 RX ADMIN — Medication 1 TABLET: at 09:04

## 2022-11-01 RX ADMIN — SODIUM CHLORIDE 11 UNITS/HR: 9 INJECTION, SOLUTION INTRAVENOUS at 22:29

## 2022-11-01 RX ADMIN — ATORVASTATIN CALCIUM 80 MG: 80 TABLET, FILM COATED ORAL at 17:09

## 2022-11-01 NOTE — PROGRESS NOTES
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  male MRN: [id number]  : 1960  Unit/Bed#: [place] [address] Encounter: [id number]    Assessment and Plan:   1  Acute on chronic respiratory failure/bronchitis:  Managed per primary team and pulmonology    -  patient on doxycycline 100 mg b i d     -  currently on IV steroids    -  continue breathing treatments and chest percussion    2  COPD:  Care as above in # 1      3  Acute on chronic diastolic heart failure:  Mild, NT proBNP in the emergency room 510, baseline appears to be around 200    -  patient appears to be euvolemic    -  will discontinue IV Lasix and transition him back to Demadex 20 mg daily    -  patient had been using Demadex in the outpatient setting p r n     -  continue Toprol XL 20 mg daily    -  continue Cozaar 50 mg once day    -  monitor I&O, daily weights and labs    -  low-sodium diet    4  Hyponatremia:  Improved after diuresis  Continue monitor    5  Chronic kidney disease:  Baseline creatinine appears to be around 1    -  currently stable    6  Morbid obesity obstructive sleep apnea:  Patient compliant with CPAP therapy    7  Chronic atrial fibrillation:  Rates controlled    -  continue digoxin 0 25 mg daily    -  continue Toprol  mg daily    -  continue Eliquis 5 mg b i d     8  Diabetes:  Hemoglobin A1c 9 2, managed per primary team    9  Hypertension:  Blood pressure stable at this time    -  continue Cozaar 50 mg once a day    -  continue Toprol  mg daily    10  Bipolar disorder:  Continue meds as he was taking at home    Subjective / Objective:   Patient seen and examined  No complaints offered at this time  He is resting comfortably in bed  We will continue follow patient on an as needed basis  Vitals: Blood pressure 112/58, pulse 99, temperature 97 7 °F (36 5 °C), resp  rate 19, height 5' 11" (1 803 m), weight 118 kg (259 lb 4 2 oz), SpO2 96 %    Vitals:    10/31/22 0550 11/01/22 0600   Weight: 119 kg (262 lb 12 6 oz) 118 kg (259 lb 4 2 oz)     Body mass index is 36 16 kg/m²  BP Readings from Last 3 Encounters:   11/01/22 112/58   09/29/22 127/71   07/28/22 140/80     Orthostatic Blood Pressures    Flowsheet Row Most Recent Value   Blood Pressure 112/58 filed at 11/01/2022 0902   Patient Position - Orthostatic VS Lying filed at 11/01/2022 0253        I/O       10/30 0701  10/31 0700 10/31 0701 11/01 0700 11/01 0701 11/02 0700    P  O  360 1080     I V  (mL/kg) 148 3 (1 2) 20 (0 2)     Total Intake(mL/kg) 508 3 (4 3) 1100 (9 3)     Urine (mL/kg/hr) 1150 1300 (0 5)     Total Output 1150 1300     Net -641 7 -200            Unmeasured Urine Occurrence  1 x         Invasive Devices  Report    Peripheral Intravenous Line  Duration           Peripheral IV 10/31/22 Dorsal (posterior); Right Hand 1 day                  Intake/Output Summary (Last 24 hours) at 11/1/2022 1106  Last data filed at 11/1/2022 0530  Gross per 24 hour   Intake 740 ml   Output 1000 ml   Net -260 ml         Physical Exam:   Physical Exam  Vitals and nursing note reviewed  Constitutional:       General: He is not in acute distress  Appearance: Normal appearance  He is morbidly obese  HENT:      Right Ear: External ear normal       Left Ear: External ear normal       Nose: Nose normal    Eyes:      General: No scleral icterus  Right eye: No discharge  Left eye: No discharge  Cardiovascular:      Rate and Rhythm: Normal rate  Rhythm irregular  Pulses: Normal pulses  Pulmonary:      Effort: Pulmonary effort is normal  No accessory muscle usage or respiratory distress  Breath sounds: Wheezing and rhonchi present  Comments: Coarse scattered breath sounds  Patient still appears comfortable lying at a 20 degree angle on his side in bed  Abdominal:      General: Bowel sounds are normal  There is no distension  Palpations: Abdomen is soft     Musculoskeletal:      Right lower leg: No edema  Left lower leg: No edema  Skin:     General: Skin is warm and dry  Capillary Refill: Capillary refill takes less than 2 seconds  Neurological:      General: No focal deficit present  Mental Status: He is alert and oriented to person, place, and time  Mental status is at baseline  Psychiatric:         Mood and Affect: Mood normal          Behavior: Behavior is cooperative              Medications/ Allergies:     Current Facility-Administered Medications   Medication Dose Route Frequency Provider Last Rate   • acetaminophen  650 mg Oral Q6H PRN MANAS Yoder     • ALPRAZolam  2 mg Oral HS MANAS Yoder     • apixaban  5 mg Oral BID MANAS Yoder     • vitamin C  1,000 mg Oral Daily MANAS Yoder     • aspirin  81 mg Oral Daily MANAS Yoder     • atorvastatin  80 mg Oral Daily With MANAS Arnold     • busPIRone  10 mg Oral BID MANAS Yoder     • cholecalciferol  1,000 Units Oral Daily MANAS Yoder     • cyclobenzaprine  10 mg Oral TID PRN MANAS Yoder     • digoxin  250 mcg Oral Daily MANAS Yoder     • doxycycline hyclate  100 mg Oral Q12H [address] MANAS Yoder     • fish oil  1,000 mg Oral BID MANAS Yoder     • Fluticasone Furoate-Vilanterol  1 puff Inhalation Daily MANAS Yoder     • furosemide  40 mg Intravenous Daily MANAS Yoder     • gabapentin  600 mg Oral TID MANAS Yoder     • guaiFENesin  1,200 mg Oral BID MANAS Yoder     • insulin regular (HumuLIN R,NovoLIN R) infusion  0 3-21 Units/hr Intravenous Titrated Jeri Pfeiffer DO 12 Units/hr (11/01/22 0901)   • ipratropium  0 5 mg Nebulization TID MANAS Yoder     • levalbuterol  0 63 mg Nebulization Q4H PRN MANAS Yoder     • levalbuterol  1 25 mg Nebulization TID MANAS Yoder      And   • sodium chloride  3 mL Nebulization TID MANAS Yoder     • losartan  50 mg Oral Daily MANAS Yoder     • methylPREDNISolone sodium succinate  40 mg Intravenous Q12H [address] Jrei Revtimothyar, DO     • metoprolol succinate  200 mg Oral Daily MANAS Yoder     • multivitamin-minerals  1 tablet Oral Daily MANAS Yoder     • pantoprazole  40 mg Oral Early Morning MANAS Yoder     • potassium chloride  20 mEq Oral Daily MANAS Yoder     • QUEtiapine  100 mg Oral QAM MANAS Yoder     • QUEtiapine  300 mg Oral HS MANAS Yoder     • sertraline  50 mg Oral Daily MANAS Yoder       acetaminophen, 650 mg, Q6H PRN  cyclobenzaprine, 10 mg, TID PRN  levalbuterol, 0 63 mg, Q4H PRN      Allergies   Allergen Reactions   • Wellbutrin [Bupropion] Other (See Comments)     Alteration with hearing and other senses       VTE Pharmacologic Prophylaxis:   Sequential compression device (Venodyne)     Labs:   Troponins:  Results from last 7 days   Lab Units 10/31/22  0057 10/30/22  2255   HSTNI D2 ng/L  --  0   HSTNI D4 ng/L 0  --      CBC with diff:  Results from last 7 days   Lab Units 11/01/22  0523 10/30/22  2053   WBC Thousand/uL 28 56* 20 18*   HEMOGLOBIN g/dL 13 3 12 4   HEMATOCRIT % 40 1 36 0*   MCV fL 93 92   PLATELETS Thousands/uL 236 164   MCH pg 30 9 31 8   MCHC g/dL 33 2 34 4   RDW % 13 0 13 1   MPV fL 9 7 8 9     CMP:  Results from last 7 days   Lab Units 11/01/22  0523 10/31/22  0528 10/30/22  2053   SODIUM mmol/L 137 126* 124*   POTASSIUM mmol/L 4 2 4 7 4 3   CHLORIDE mmol/L 99 91* 90*   CO2 mmol/L 24 24 24   ANION GAP mmol/L 14* 11 10   BUN mg/dL 31* 22 24   CREATININE mg/dL 1 07 1 37* 1 09   CALCIUM mg/dL 10 0 9 8 8 9   ALT U/L  --   --  20   ALK PHOS U/L  --   --  53   TOTAL PROTEIN g/dL  --   --  7 1   ALBUMIN g/dL  --   --  3 5   TOTAL BILIRUBIN mg/dL  --   --  1 20*   EGFR ml/min/1 73sq m 73 54 71     Magnesium:  Results from last 7 days   Lab Units 10/31/22  0528 10/30/22  2053   MAGNESIUM mg/dL 2 3 1 7     Coags:  Results from last 7 days   Lab Units 10/30/22  2053   PTT seconds 32   INR 1 43*     TSH:  Results from last 7 days   Lab Units 10/31/22  0528   TSH 3RD GENERATON uIU/mL 0 373*     NT-proBNP:   Recent Labs     10/30/22  2053   NTBNP 510*        Imaging & Testing   I have personally reviewed pertinent reports  XR chest 1 view portable    Result Date: 10/31/2022  Narrative: CHEST INDICATION:   SOB  COMPARISON:  06/17/2022 EXAM PERFORMED/VIEWS:  XR CHEST PORTABLE 1 image FINDINGS: Cardiomediastinal silhouette appears enlarged  Patient is rotated to the right  There are no infiltrates  Mild vascular congestion  No pneumothorax or pleural effusion  Osseous structures appear within normal limits for patient age  Impression: Cardiomegaly  Mild vascular congestion  Workstation performed: [id number]            [facility] [address]  Cardiology      "This note was completed in part utilizing m-modal fluency direct voice recognition software  Grammatical errors, random word insertion, spelling mistakes, and incomplete sentences may be an occasional consequence of the system secondary to software limitations, ambient noise and hardware issues  Please read the chart carefully and recognize, using context, where substitutions have occurred    If you have any questions or concerns about the context, text or information contained within the body of this dictation, please contact myself, the provider, for further clarification "

## 2022-11-01 NOTE — ASSESSMENT & PLAN NOTE
On Trelegy, albuterol inhaler and nebulizer p r n  at home  · Received IV Solu-Medrol 60 mg in ED, decreased to 40 mg Solu-Medrol q 12 from q 8 hours  · Continue doxycycline in view of wet cough( avoiding Zithromax due to interaction with digoxin)  · Mucinex  · Xopenex+ Atrovent t i d , Xopenex p r n    · Substituted Trelegy with Ivelisse Lies  · Pulmonary input appreciated

## 2022-11-01 NOTE — PLAN OF CARE
Problem: Potential for Falls  Goal: Patient will remain free of falls  Description: INTERVENTIONS:  - Educate patient/family on patient safety including physical limitations  - Instruct patient to call for assistance with activity   - Consult OT/PT to assist with strengthening/mobility   - Keep Call bell within reach  - Keep bed low and locked with side rails adjusted as appropriate  - Keep care items and personal belongings within reach  - Initiate and maintain comfort rounds  - Make Fall Risk Sign visible to staff  - Offer Toileting every 2  Hours, in advance of need  - Initiate/Maintain bed alarm  - Obtain necessary fall risk management equipment:   - Apply yellow socks and bracelet for high fall risk patients  - Consider moving patient to room near nurses station  Outcome: Progressing     Problem: MOBILITY - ADULT  Goal: Maintains/Returns to pre admission functional level  Description: INTERVENTIONS:  - Perform BMAT or MOVE assessment daily    - Set and communicate daily mobility goal to care team and patient/family/caregiver  - Collaborate with rehabilitation services on mobility goals if consulted  - Perform Range of Motion 3  times a day  - Actively turns self     - Dangle patient 3  times a day  - Stand patient 3  times a day  - Ambulate patient 3 times a day  - Out of bed to chair 3 times a day   - Out of bed for meals 3 times a day  - Out of bed for toileting  - Record patient progress and toleration of activity level   Outcome: Progressing     Problem: PAIN - ADULT  Goal: Verbalizes/displays adequate comfort level or baseline comfort level  Description: Interventions:  - Encourage patient to monitor pain and request assistance  - Assess pain using appropriate pain scale  - Administer analgesics based on type and severity of pain and evaluate response  - Implement non-pharmacological measures as appropriate and evaluate response  - Consider cultural and social influences on pain and pain management  - Notify physician/advanced practitioner if interventions unsuccessful or patient reports new pain  Outcome: Progressing     Problem: DISCHARGE PLANNING  Goal: Discharge to home or other facility with appropriate resources  Description: INTERVENTIONS:  - Identify barriers to discharge w/patient and caregiver  - Arrange for needed discharge resources and transportation as appropriate  - Identify discharge learning needs (meds, wound care, etc )  - Arrange for interpretive services to assist at discharge as needed  - Refer to Case Management Department for coordinating discharge planning if the patient needs post-hospital services based on physician/advanced practitioner order or complex needs related to functional status, cognitive ability, or social support system  Outcome: Progressing     Problem: Knowledge Deficit  Goal: Patient/family/caregiver demonstrates understanding of disease process, treatment plan, medications, and discharge instructions  Description: Complete learning assessment and assess knowledge base    Interventions:  - Provide teaching at level of understanding  - Provide teaching via preferred learning methods  Outcome: Progressing     Problem: RESPIRATORY - ADULT  Goal: Achieves optimal ventilation and oxygenation  Description: INTERVENTIONS:  - Assess for changes in respiratory status  - Assess for changes in mentation and behavior  - Position to facilitate oxygenation and minimize respiratory effort  - Oxygen administered by appropriate delivery if ordered  - Initiate smoking cessation education as indicated  - Encourage broncho-pulmonary hygiene including cough, deep breathe, Incentive Spirometry  - Assess the need for suctioning and aspirate as needed  - Assess and instruct to report SOB or any respiratory difficulty  - Respiratory Therapy support as indicated  Outcome: Progressing     Problem: METABOLIC, FLUID AND ELECTROLYTES - ADULT  Goal: Glucose maintained within target range  Description: INTERVENTIONS:  - Monitor Blood Glucose as ordered  - Assess for signs and symptoms of hyperglycemia and hypoglycemia  - Administer ordered medications to maintain glucose within target range  - Assess nutritional intake and initiate nutrition service referral as needed  Outcome: Progressing     Problem: METABOLIC, FLUID AND ELECTROLYTES - ADULT  Goal: Fluid balance maintained  Description: INTERVENTIONS:  - Monitor labs   - Monitor I/O and WT  - Instruct patient on fluid and nutrition as appropriate  - Assess for signs & symptoms of volume excess or deficit  Outcome: Progressing     Problem: METABOLIC, FLUID AND ELECTROLYTES - ADULT  Goal: Electrolytes maintained within normal limits  Description: INTERVENTIONS:  - Monitor labs and assess patient for signs and symptoms of electrolyte imbalances  - Administer electrolyte replacement as ordered  - Monitor response to electrolyte replacements, including repeat lab results as appropriate  - Instruct patient on fluid and nutrition as appropriate  Outcome: Progressing     Problem: MUSCULOSKELETAL - ADULT  Goal: Maintain proper alignment of affected body part  Description: INTERVENTIONS:  - Support, maintain and protect limb and body alignment  - Provide patient/ family with appropriate education  Outcome: Progressing     Problem: MUSCULOSKELETAL - ADULT  Goal: Maintain or return mobility to safest level of function  Description: INTERVENTIONS:  - Assess patient's ability to carry out ADLs; assess patient's baseline for ADL function and identify physical deficits which impact ability to perform ADLs (bathing, care of mouth/teeth, toileting, grooming, dressing, etc )  - Assess/evaluate cause of self-care deficits   - Assess range of motion  - Assess patient's mobility  - Assess patient's need for assistive devices and provide as appropriate  - Encourage maximum independence but intervene and supervise when necessary  - Involve family in performance of ADLs  - Assess for home care needs following discharge   - Consider OT consult to assist with ADL evaluation and planning for discharge  - Provide patient education as appropriate  Outcome: Progressing

## 2022-11-01 NOTE — ASSESSMENT & PLAN NOTE
Lab Results   Component Value Date    HGBA1C 9 2 (H) 09/29/2022       Recent Labs     11/01/22  1300 11/01/22  1458 11/01/22  1608 11/01/22  1703   POCGLU 212* 141* 114 193*     On Basaglar 60 units subQ b i d , NovoLog SSI, metformin, Victoza at home    · Discontinued Lantus, Humalog 10/31 as blood sugars were persistently elevated  · Currently on non DKA insulin drip with improvement in blood sugars  · Likely transition off insulin drip and place back on lantus and Humalog with meals starting tomorrow  · Hold metformin and Victoza

## 2022-11-01 NOTE — PROGRESS NOTES
Phan 44  Progress Note Bubba Maravilla 1960, 60 y o  male MRN: [id number]  Unit/Bed#: [address] [place] [address] Encounter: [id number]  Primary Care Provider: Demetris Rivero MD   Date and time admitted to hospital: 10/30/2022  8:26 PM    * COPD with exacerbation [facility])  Assessment & Plan  On Trelegy, albuterol inhaler and nebulizer p r n  at home  · Received IV Solu-Medrol 60 mg in ED, decreased to 40 mg Solu-Medrol q 12 from q 8 hours  · Continue doxycycline in view of wet cough( avoiding Zithromax due to interaction with digoxin)  · Mucinex  · Xopenex+ Atrovent t i d , Xopenex p r n  · Substituted Trelegy with Ada Roll  · Pulmonary input appreciated    Acute on chronic diastolic congestive heart failure [facility])  Assessment & Plan  Wt Readings from Last 3 Encounters:   11/01/22 118 kg (259 lb 4 2 oz)   09/28/22 126 kg (277 lb 3 2 oz)   07/28/22 118 kg (260 lb)     Cardiology Telemedicine note in July 2022 indicated patient is to use torsemide intermittently and use daily if weight gain  · Patient reports taking Demadex daily at home, but unsure dose  · 2D echo in 2/2022 showed EF 55%, dilated atriums, no significant valvular disease  · ProBNP, chest x-ray as above  · Was on Lasix 40 mg IV daily but has been transitioned to 20 mg of Demadex daily  · Cardiac diet, liberalize fluid restriction  · Daily weight, I&Os  · Cardiology input appreciated            Type 2 diabetes mellitus with complication, with long-term current use of insulin [facility])  Assessment & Plan  Lab Results   Component Value Date    HGBA1C 9 2 (H) 09/29/2022       Recent Labs     11/01/22  1300 11/01/22  1458 11/01/22  1608 11/01/22  1703   POCGLU 212* 141* 114 193*     On Basaglar 60 units subQ b i d , NovoLog SSI, metformin, Victoza at home    · Discontinued Lantus, Humalog 10/31 as blood sugars were persistently elevated  · Currently on non DKA insulin drip with improvement in blood sugars  · Likely transition off insulin drip and place back on lantus and Humalog with meals starting tomorrow  · Hold metformin and Victoza    Chronic respiratory failure [facility])  Assessment & Plan  Patient presented with worsening SOB and wet cough since Friday  Denied fever, chills, runny nose, sore throat  Reports using oxygen 3L chronically at home, use BiPAP 12/5 with oxygen 3L at bedtime  · Currently on oxygen 3L  · COVID-19/flu/RSV negative  · Chest x-ray with mild vascular congestion, ProBNP 510  · Suspect multifactorial secondary to acute on chronic CHF, COPD exacerbation  · Received one hour neb, DuoNebs, Solu-Medrol 60mg, cefepime in ED  · Treat underlying causes as below    CLL (chronic lymphocytic leukemia) ([facility] [address] )  Assessment & Plan  Follows hematology as outpatient  Currently under surveillance  · WBC 20 18 on admission --> 28 today, partly steroid mediated    Chronic a-fib (HCC)  Assessment & Plan  On Eliquis, digoxin, Toprol at home  · EKG showed uncontrolled AFib, rate 114 her prior provider  · Currently rate controlled  · digoxin level 0 7  · Continue home medications  Stage 2 chronic kidney disease  Assessment & Plan  Lab Results   Component Value Date    EGFR 73 11/01/2022    EGFR 54 10/31/2022    EGFR 71 10/30/2022    CREATININE 1 07 11/01/2022    CREATININE 1 37 (H) 10/31/2022    CREATININE 1 09 10/30/2022     Baseline creatinine appears to be around 0 7-1 0  · Creatinine mildly higher on 10/31 but within normal limits again today  · Monitor    Dyslipidemia  Assessment & Plan  Continue statin    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)  Assessment & Plan  Body mass index is 36 16 kg/m²    Diet and lifestyle modification    Essential hypertension  Assessment & Plan  On losartan, Toprol-XL at home which is being continued  · BPs stable overall    Chronic low back pain  Assessment & Plan  Continue Neurontin, Flexeril p r n    Esophageal reflux  Assessment & Plan  Continue PPI    Coronary artery disease  Assessment & Plan  Denies history of cardiac stents  · Continue aspirin, Toprol-XL, Lipitor    SHAVONNE and COPD overlap syndrome   Assessment & Plan  Continue BiPAP , with oxygen at bedtime  Psychiatric disorder  Assessment & Plan  History of bipolar, depression with anxiety  Continue Zoloft, Seroquel, BuSpar, Xanax at home  · Verified at [facility] website by prior provider      Hyponatremia-resolved as of 2022  Assessment & Plan  Sodium 124 --> 137 today  · Partly hyponatremia from hyperglycemia  · IV Lasix as above  · uric acid normal  urine sodium, urine Osm 229, serum osmol 275  · TSH low, free T4 within normal limits  Repeat lab work as outpatient      VTE Pharmacologic Prophylaxis: VTE Score: 6 High Risk (Score >/= 5) - Pharmacological DVT Prophylaxis Ordered: apixaban (Eliquis)  Sequential Compression Devices Ordered  Patient Centered Rounds: I performed bedside rounds with nursing staff today  Discussions with Specialists or Other Care Team Provider: yes - pulm, cardio    Education and Discussions with Family / Patient: Patient declined call to   Time Spent for Care: 35 min  More than 50% of total time spent on counseling and coordination of care as described above  Current Length of Stay: 2 day(s)  Current Patient Status: Inpatient   Certification Statement: The patient will continue to require additional inpatient hospital stay due to COPD exacerbation requiring IV steroids, uncontrolled diabetes requiring insulin drip  Discharge Plan: Anticipate discharge in 24-48 hrs to home with home services  Code Status: Level 3 - DNAR and DNI    Subjective:     Patient requesting that his fluid restriction be liberalized    States shortness of breath is significantly improved and feels almost 90% back at baseline    Objective:     Vitals:   Temp (24hrs), Av 7 °F (36 5 °C), Min:97 6 °F (36 4 °C), Max:98 °F (36 7 °C)    Temp:  [97 6 °F (36 4 °C)-98 °F (36 7 °C)] 98 °F (36 7 °C)  HR:  [] 88  Resp:  [18-19] 18  BP: (104-123)/(58-71) 120/71  SpO2:  [93 %-97 %] 94 %  Body mass index is 36 16 kg/m²  Input and Output Summary (last 24 hours): Intake/Output Summary (Last 24 hours) at 11/1/2022 1807  Last data filed at 11/1/2022 1200  Gross per 24 hour   Intake 1100 ml   Output 1000 ml   Net 100 ml       Physical Exam:   Physical Exam  Vitals reviewed  Constitutional:       General: He is not in acute distress  Appearance: He is not ill-appearing  HENT:      Head: Normocephalic and atraumatic  Eyes:      Extraocular Movements: Extraocular movements intact  Cardiovascular:      Rate and Rhythm: Normal rate  Rhythm irregular  Pulmonary:      Effort: Pulmonary effort is normal  No respiratory distress  Breath sounds: No wheezing or rales  Comments: Decreased breath sounds bilaterally  Abdominal:      General: Bowel sounds are normal  There is no distension  Palpations: Abdomen is soft  Tenderness: There is no abdominal tenderness  Neurological:      Mental Status: He is alert and oriented to person, place, and time           Additional Data:     Labs:  Results from last 7 days   Lab Units 11/01/22  0523 10/30/22  2053   WBC Thousand/uL 28 56* 20 18*   HEMOGLOBIN g/dL 13 3 12 4   HEMATOCRIT % 40 1 36 0*   PLATELETS Thousands/uL 236 164   BANDS PCT %  --  8   LYMPHO PCT %  --  30   MONO PCT %  --  4   EOS PCT %  --  0     Results from last 7 days   Lab Units 11/01/22  0523 10/31/22  0528 10/30/22  2053   SODIUM mmol/L 137   < > 124*   POTASSIUM mmol/L 4 2   < > 4 3   CHLORIDE mmol/L 99   < > 90*   CO2 mmol/L 24   < > 24   BUN mg/dL 31*   < > 24   CREATININE mg/dL 1 07   < > 1 09   ANION GAP mmol/L 14*   < > 10   CALCIUM mg/dL 10 0   < > 8 9   ALBUMIN g/dL  --   --  3 5   TOTAL BILIRUBIN mg/dL  --   --  1 20*   ALK PHOS U/L  --   --  53   ALT U/L  --   --  20   GLUCOSE RANDOM mg/dL 164*   < > 243*    < > = values in this interval not displayed  Results from last 7 days   Lab Units 10/30/22  2053   INR  1 43*     Results from last 7 days   Lab Units 11/01/22  1703 11/01/22  1608 11/01/22  1458 11/01/22  1300 11/01/22  1055 11/01/22  0854 11/01/22  0706 11/01/22  0509 11/01/22  0318 11/01/22  0118 10/31/22  2301 10/31/22  2056   POC GLUCOSE mg/dl 193* 114 141* 212* 194* 187* 157* 171* 238* 108 184* 184*         Results from last 7 days   Lab Units 10/31/22  0528 10/30/22  2129 10/30/22  2053   LACTIC ACID mmol/L  --  1 7  --    PROCALCITONIN ng/ml 0 35*  --  0 39*       Lines/Drains:  Invasive Devices  Report    Peripheral Intravenous Line  Duration           Peripheral IV 10/31/22 Dorsal (posterior); Right Hand 1 day              Imaging: No pertinent imaging reviewed  Recent Cultures (last 7 days):   Results from last 7 days   Lab Units 10/30/22  2130 10/30/22  2129   BLOOD CULTURE  No Growth at 24 hrs  No Growth at 24 hrs         Last 24 Hours Medication List:   Current Facility-Administered Medications   Medication Dose Route Frequency Provider Last Rate   • acetaminophen  650 mg Oral Q6H PRN MANAS Yoder     • ALPRAZolam  2 mg Oral HS MANAS Yoder     • apixaban  5 mg Oral BID MANAS Yoder     • vitamin C  1,000 mg Oral Daily MANAS Yoder     • aspirin  81 mg Oral Daily MANAS Yoder     • atorvastatin  80 mg Oral Daily With MANAS Arnold     • busPIRone  10 mg Oral BID MANAS Yoder     • cholecalciferol  1,000 Units Oral Daily MANAS Yoder     • cyclobenzaprine  10 mg Oral TID PRN MANAS Yoder     • digoxin  250 mcg Oral Daily MANAS Yoder     • doxycycline hyclate  100 mg Oral Q12H [facility] MANAS Yoder     • fish oil  1,000 mg Oral BID MANAS Yoder     • Fluticasone Furoate-Vilanterol  1 puff Inhalation Daily MANAS Yoder     • gabapentin  600 mg Oral TID MANAS Yoder     • guaiFENesin  1,200 mg Oral BID MANAS Yoder     • insulin regular (HumuLIN R,NovoLIN R) infusion  0 3-21 Units/hr Intravenous Titrated Jeri Pfeiffer DO 12 Units/hr (11/01/22 1704)   • ipratropium  0 5 mg Nebulization TID MANAS Yoder     • levalbuterol  0 63 mg Nebulization Q4H PRN MANAS Yoder     • levalbuterol  1 25 mg Nebulization TID MANAS Yoder      And   • sodium chloride  3 mL Nebulization TID MANAS Yoder     • losartan  50 mg Oral Daily MANAS Yoder     • methylPREDNISolone sodium succinate  40 mg Intravenous Q12H [facility] Jeri PfeifferDO     • metoprolol succinate  200 mg Oral Daily MANAS Yoder     • multivitamin-minerals  1 tablet Oral Daily MANAS Yoder     • pantoprazole  40 mg Oral Early Morning MANAS Yoder     • potassium chloride  20 mEq Oral Daily MANAS Yoder     • QUEtiapine  100 mg Oral QAM MANAS Yoder     • QUEtiapine  300 mg Oral HS MANAS Yoder     • sertraline  50 mg Oral Daily MANAS Yoder     • [START ON 11/2/2022] torsemide  20 mg Oral Daily MANAS Garduno          Today, Patient Was Seen By: Nicole Macias    **Please Note: This note may have been constructed using a voice recognition system  **

## 2022-11-01 NOTE — ASSESSMENT & PLAN NOTE
Lab Results   Component Value Date    EGFR 73 11/01/2022    EGFR 54 10/31/2022    EGFR 71 10/30/2022    CREATININE 1 07 11/01/2022    CREATININE 1 37 (H) 10/31/2022    CREATININE 1 09 10/30/2022     Baseline creatinine appears to be around 0 7-1 0  · Creatinine mildly higher on 10/31 but within normal limits again today  · Monitor

## 2022-11-01 NOTE — PROGRESS NOTES
Progress Note - Pulmonary   Brain Armstrong 60 y o  male MRN: [id number]  Unit/Bed#: [address] [place] [address] Encounter: [id number]    Assessment:  Acute exacerbation of COPD improved  Chronic hypoxemic respiratory failure  Hyperglycemia related to steroid therapy and diabetes mellitus type 2  Obesity with alveolar hypoventilation  CLL stage 0  Chronic atrial fibrillation    Plan:  Continue course of doxycycline 100 mg b i d  For 7 days for possible underlying Neck to acute bacterial bronchitis  Concur with decreasing methylprednisone 40 mg IV every 12 hours  Patient still on insulin drip to help control blood sugars  If remains stable could consider discharge tomorrow with tapering dose of prednisone starting at 40 mg daily with 10 mg taper every 3rd day  Continue neb treatments with levalbuterol 1 25 mg and Atrovent 0 5 mg as ordered  Continue BiPAP 12/6 with oxygen at bedtime  Normally at home patient uses 3 liters/minute with his BiPAP 12/5 and 3 L during the day      Subjective:   Breathing is better today  Objective:     Vitals: Blood pressure 120/71, pulse 88, temperature 98 °F (36 7 °C), temperature source Oral, resp  rate 18, height 5' 11" (1 803 m), weight 118 kg (259 lb 4 2 oz), SpO2 94 %  ,Body mass index is 36 16 kg/m²  Intake/Output Summary (Last 24 hours) at 11/1/2022 1737  Last data filed at 11/1/2022 1200  Gross per 24 hour   Intake 1100 ml   Output 1000 ml   Net 100 ml       Physical Exam: Physical Exam  Vitals reviewed  Constitutional:       General: He is not in acute distress  Appearance: Normal appearance  He is well-developed  He is obese  Comments: On 2 liters/minute nasal cannula oxygen O2 saturation 94%   HENT:      Head: Normocephalic  Right Ear: External ear normal       Left Ear: External ear normal       Nose: Nose normal       Mouth/Throat:      Mouth: Mucous membranes are moist       Pharynx: Oropharynx is clear  No oropharyngeal exudate     Eyes:      Conjunctiva/sclera: Conjunctivae normal       Pupils: Pupils are equal, round, and reactive to light  Neck:      Vascular: No JVD  Trachea: No tracheal deviation  Cardiovascular:      Rate and Rhythm: Normal rate and regular rhythm  Heart sounds: Normal heart sounds  Pulmonary:      Effort: Pulmonary effort is normal       Comments: Lung sounds are clear  No wheezes, crackles or rhonchi  Abdominal:      General: There is no distension  Palpations: Abdomen is soft  Tenderness: There is no abdominal tenderness  There is no guarding  Musculoskeletal:      Cervical back: Neck supple  Comments: No edema, cyanosis or clubbing   Lymphadenopathy:      Cervical: No cervical adenopathy  Skin:     General: Skin is warm and dry  Findings: No rash  Neurological:      General: No focal deficit present  Mental Status: He is alert and oriented to person, place, and time  Psychiatric:         Behavior: Behavior normal          Thought Content: Thought content normal           Labs: I have personally reviewed pertinent lab results  , ABG:   Lab Results   Component Value Date    PHART 7 413 03/24/2022    [id number] 38 3 03/24/2022    PO2ART 132 0 (H) 03/24/2022    CEK0JLJ 23 9 03/24/2022    BEART -0 4 03/24/2022    SOURCE Brachial, Right 03/24/2022   , BNP: No results found for: BNP, CBC:   Lab Results   Component Value Date    WBC 28 56 (H) 11/01/2022    HGB 13 3 11/01/2022    HCT 40 1 11/01/2022    MCV 93 11/01/2022     11/01/2022    ADJUSTEDWBC 8 60 09/14/2016    MCH 30 9 11/01/2022    MCHC 33 2 11/01/2022    RDW 13 0 11/01/2022    MPV 9 7 11/01/2022    NRBC 0 09/29/2022   , CMP:   Lab Results   Component Value Date    K 4 2 11/01/2022    CL 99 11/01/2022    CO2 24 11/01/2022    BUN 31 (H) 11/01/2022    CREATININE 1 07 11/01/2022    CALCIUM 10 0 11/01/2022    AST  10/30/2022      Comment:      No result  If an AST is required for patient care, it must be ordered separately    Specimen collection should occur prior to Sulfasalazine administration due to the potential for falsely depressed results  ALT 20 10/30/2022    ALKPHOS 53 10/30/2022    EGFR 73 11/01/2022   , PT/INR:   Lab Results   Component Value Date    INR 1 43 (H) 10/30/2022   , Troponin: No results found for: TROPONIN    Imaging and other studies: I have personally reviewed pertinent reports     and I have personally reviewed pertinent films in PACS

## 2022-11-01 NOTE — PLAN OF CARE
Problem: RESPIRATORY - ADULT  Goal: Achieves optimal ventilation and oxygenation  Description: INTERVENTIONS:  - Assess for changes in respiratory status  - Assess for changes in mentation and behavior  - Position to facilitate oxygenation and minimize respiratory effort  - Oxygen administered by appropriate delivery if ordered  - Initiate smoking cessation education as indicated  - Encourage broncho-pulmonary hygiene including cough, deep breathe, Incentive Spirometry  - Assess the need for suctioning and aspirate as needed  - Assess and instruct to report SOB or any respiratory difficulty  - Respiratory Therapy support as indicated  Outcome: Progressing        Problem: METABOLIC, FLUID AND ELECTROLYTES - ADULT  Goal: Glucose maintained within target range  Description: INTERVENTIONS:  - Monitor Blood Glucose as ordered  - Assess for signs and symptoms of hyperglycemia and hypoglycemia  - Administer ordered medications to maintain glucose within target range  - Assess nutritional intake and initiate nutrition service referral as needed  Outcome: Progressing

## 2022-11-01 NOTE — ASSESSMENT & PLAN NOTE
Wt Readings from Last 3 Encounters:   11/01/22 118 kg (259 lb 4 2 oz)   09/28/22 126 kg (277 lb 3 2 oz)   07/28/22 118 kg (260 lb)     Cardiology Telemedicine note in July 2022 indicated patient is to use torsemide intermittently and use daily if weight gain  · Patient reports taking Demadex daily at home, but unsure dose  · 2D echo in 2/2022 showed EF 55%, dilated atriums, no significant valvular disease    · ProBNP, chest x-ray as above  · Was on Lasix 40 mg IV daily but has been transitioned to 20 mg of Demadex daily  · Cardiac diet, liberalize fluid restriction  · Daily weight, I&Os  · Cardiology input appreciated

## 2022-11-01 NOTE — PHYSICAL THERAPY NOTE
PT EVALUATION     22 1053   Note Type   Note type Evaluation   Pain Assessment   Pain Assessment Tool 0-10   Pain Score 4   Pain Location/Orientation Orientation: Lower; Location: Back   Hospital Pain Intervention(s) Repositioned; Emotional support; Ambulation/increased activity   Restrictions/Precautions   Other Precautions Fall Risk;Bed Alarm; Chair Alarm;O2;Multiple lines   Home Living   Type of [address] One level;Elevator   Home Equipment Electric scooter;Cane  (RW; home O2, 3 L 24/7)   Prior Function   Level of [place] Independent with functional mobility; Needs assistance with IADLS;Needs assistance with ADLs  (Assist with ADLs from “the knees down”)   Lives With Alone   Receives Help From Home health  (20 hours per week)   Comments Pt states he mostly uses his electric scooter around his apartment and when he goes out  Pt can use his cane or RW for short distances  Pt has is groceries delivered   General   Additional Pertinent History Pt is admitted with shortness of breath and cough   Family/Caregiver Present No   Cognition   Overall Cognitive Status WFL;Pt ID by name and   Confirmed by LAZARO redd and RN     Arousal/Participation Cooperative   Orientation Level Oriented X4   Following Commands Follows all commands and directions without difficulty   Subjective   Subjective “Can you please let the nursing staff know that I am usually able to walk to the bathroom by myself using the cane and the IV pole”   RLE Assessment   RLE Assessment WFL  (3 to 3+/5)   LLE Assessment   LLE Assessment WFL  (3 to 3+/5)   Transfers   Sit to Stand 6  Modified independent   Additional items Assist x 1   Stand to Sit 6  Modified independent   Additional items Assist x 1   Toilet transfer 6  Modified independent   Additional items Assist x 1   Ambulation/Elevation   Gait pattern Wide KASSIE  (Decreased gait speed)   Gait Assistance 6  Modified independent   Additional items Assist x 1   Assistive Device Straight cane  (IV pole)   Distance 12 ft to and from the bathroom; pt moderately SOB at end of ambulation/toileting on 3 L O2, SaO2 95-96% at rest and at the end of activity   Balance   Static Sitting Good   Static Standing Good  (With cane)   Dynamic Standing Fair  (With cane and IV pole)   Ambulatory Fair  (With cane and IV pole)   Activity Tolerance   Activity Tolerance Patient limited by fatigue;Patient limited by pain  (Moderate shortness of breath)   Assessment   Problem List Decreased strength;Decreased range of motion;Decreased endurance; Impaired balance;Decreased mobility;Obesity;Pain   Assessment Patient seen for Physical Therapy evaluation  Patient admitted with Acute on chronic respiratory failure ([facility] [address] )  Comorbidities affecting patient's physical performance include:  Psychiatric disorder, COPD, SHAVONNE, CAD, chronic low back pain, hypertension, CAD, obesity, alveolar hypoventilation, dm 2, CKD 2, chronic AFib, CHF, CLL, diabetic neuropathy, lumbar radiculopathy  Personal factors affecting patient at time of initial evaluation include: lives in one story house, ambulating with assistive device, inability to navigate community distances, inability to navigate level surfaces without external assistance, inability to perform dynamic tasks in community and limited home support  Prior to admission, patient was independent with functional mobility with cane, RW or E-scooter, requiring assist for ADLS, requiring assist for IADLS, living alone in one story home with no steps to enter and ambulating household distance  Please find objective findings from Physical Therapy assessment regarding body systems outlined above with impairments and limitations including weakness, decreased ROM, decreased endurance, gait deviations, pain, decreased activity tolerance, decreased functional mobility tolerance, decreased safety awareness, fall risk and SOB upon exertion    The Barthel Index was used as a functional outcome tool presenting with a score of Barthel Index Score: 50 today indicating marked limitations of functional mobility and ADLS  Patient's clinical presentation is currently unstable/unpredictable as seen in patient's presentation of vital sign response, changing level of pain, increased fall risk, new onset of impairment of functional mobility, decreased endurance and new onset of weakness  Pt would benefit from continued Physical Therapy treatment to address deficits as defined above and maximize level of functional mobility  As demonstrated by objective findings, the assigned level of complexity for this evaluation is high  The patient's AM-PAC Basic Mobility Inpatient Short Form Raw Score is 17  A Raw score of greater than 16 suggests the patient may benefit from discharge to home  Please also refer to the recommendation of the Physical Therapist for safe discharge planning  Goals   Patient Goals To be able to go home   STG Expiration Date 11/10/22   Short Term Goal #1 Patient will: Perform all bed mobility tasks independently to decrease fall risk factors, Perform all transfers independently to improve independence, Ambulate at least 50 ft w/ cane or RW w/o SpO2 decreasing below 90%, Increase ambulatory balance 1 grade to decrease risk for falls, Tolerate at least 15 consecutive minutes of activity to demonstrate improved activity tolerance and endurance and Tolerate 3 hr OOB to faciliate upright tolerance   Plan   Treatment/Interventions ADL retraining;Functional transfer training;LE strengthening/ROM; Therapeutic exercise; Endurance training;Patient/family training;Equipment eval/education; Bed mobility;Gait training; Compensatory technique education   PT Frequency 3-5x/wk   Recommendation   PT Discharge Recommendation Home with home health rehabilitation   Equipment Recommended   (Pt has a knee scooter, cane and a RW)   Additional Comments Pt is safe to ambulate independently to and from the bathroom using his cane in the IV pole  O2 tubing is long enough for pt to do so  Nursing staff made aware that pt is safe to do this   AM-PAC Basic Mobility Inpatient   Turning in Bed Without Bedrails 3   Lying on Back to Sitting on Edge of Flat Bed 3   Moving Bed to Chair 3   Standing Up From Chair 3   Walk in Room 3   Climb 3-5 Stairs 2   Basic Mobility Inpatient Raw Score 17   Basic Mobility Standardized Score 39 67   Highest Level Of Mobility   -HL Goal 5: Stand one or more mins   -HL Achieved 6: Walk 10 steps or more   Barthel Index   Feeding 10   Bathing 0   Grooming Score 0   Dressing Score 5   Bladder Score 10   Bowels Score 10   Toilet Use Score 5   Transfers (Bed/Chair) Score 10   Mobility (Level Surface) Score 0   Stairs Score 0   Barthel Index Score 50   End of Consult   Patient Position at End of Consult Bedside chair; All needs within reach   [facility] Insurance Number  Marguerite Esposito PT [id number]     Portions of the documentation may have been created using voice recognition software  Occasional wrong word or sound alike substitutions may have occurred due to the inherent limitation of the voice recognition software  Read the chart carefully and recognize, using context, where substitutions have occurred

## 2022-11-01 NOTE — PLAN OF CARE
Problem: PHYSICAL THERAPY ADULT  Goal: Performs mobility at highest level of function for planned discharge setting  See evaluation for individualized goals  Description: Treatment/Interventions: ADL retraining, Functional transfer training, LE strengthening/ROM, Therapeutic exercise, Endurance training, Patient/family training, Equipment eval/education, Bed mobility, Gait training, Compensatory technique education  Equipment Recommended:  (Pt has a knee scooter, cane and a RW)       See flowsheet documentation for full assessment, interventions and recommendations  Note:    Problem List: Decreased strength, Decreased range of motion, Decreased endurance, Impaired balance, Decreased mobility, Obesity, Pain  Assessment: Patient seen for Physical Therapy evaluation  Patient admitted with Acute on chronic respiratory failure ([facility] [address] )  Comorbidities affecting patient's physical performance include:  Psychiatric disorder, COPD, SHAVONNE, CAD, chronic low back pain, hypertension, CAD, obesity, alveolar hypoventilation, dm 2, CKD 2, chronic AFib, CHF, CLL, diabetic neuropathy, lumbar radiculopathy  Personal factors affecting patient at time of initial evaluation include: lives in one story house, ambulating with assistive device, inability to navigate community distances, inability to navigate level surfaces without external assistance, inability to perform dynamic tasks in community and limited home support  Prior to admission, patient was independent with functional mobility with cane, RW or E-scooter, requiring assist for ADLS, requiring assist for IADLS, living alone in one story home with no steps to enter and ambulating household distance    Please find objective findings from Physical Therapy assessment regarding body systems outlined above with impairments and limitations including weakness, decreased ROM, decreased endurance, gait deviations, pain, decreased activity tolerance, decreased functional mobility tolerance, decreased safety awareness, fall risk and SOB upon exertion  The Barthel Index was used as a functional outcome tool presenting with a score of Barthel Index Score: 50 today indicating marked limitations of functional mobility and ADLS  Patient's clinical presentation is currently unstable/unpredictable as seen in patient's presentation of vital sign response, changing level of pain, increased fall risk, new onset of impairment of functional mobility, decreased endurance and new onset of weakness  Pt would benefit from continued Physical Therapy treatment to address deficits as defined above and maximize level of functional mobility  As demonstrated by objective findings, the assigned level of complexity for this evaluation is high  The patient's AM-[facility] Basic Mobility Inpatient Short Form Raw Score is 17  A Raw score of greater than 16 suggests the patient may benefit from discharge to home  Please also refer to the recommendation of the Physical Therapist for safe discharge planning  PT Discharge Recommendation: Home with home health rehabilitation    See flowsheet documentation for full assessment

## 2022-11-01 NOTE — ASSESSMENT & PLAN NOTE
Sodium 124 --> 137 today  · Partly hyponatremia from hyperglycemia  · IV Lasix as above  · uric acid normal  urine sodium, urine Osm 229, serum osmol 275  · TSH low, free T4 within normal limits    Repeat lab work as outpatient

## 2022-11-01 NOTE — ASSESSMENT & PLAN NOTE
Follows hematology as outpatient   Currently under surveillance  · WBC 20 18 on admission --> 28 today, partly steroid mediated

## 2022-11-01 NOTE — ASSESSMENT & PLAN NOTE
History of bipolar, depression with anxiety      Continue Zoloft, Seroquel, BuSpar, Xanax at home  · Verified at [facility] website by prior provider

## 2022-11-01 NOTE — ASSESSMENT & PLAN NOTE
Patient presented with worsening SOB and wet cough since Friday  Denied fever, chills, runny nose, sore throat  Reports using oxygen 3L chronically at home, use BiPAP 12/5 with oxygen 3L at bedtime     · Currently on oxygen 3L  · COVID-19/flu/RSV negative  · Chest x-ray with mild vascular congestion, ProBNP 510  · Suspect multifactorial secondary to acute on chronic CHF, COPD exacerbation  · Received one hour neb, DuoNebs, Solu-Medrol 60mg, cefepime in ED  · Treat underlying causes as below

## 2022-11-02 LAB
ANION GAP SERPL CALCULATED.3IONS-SCNC: 9 MMOL/L (ref 4–13)
BUN SERPL-MCNC: 29 MG/DL (ref 5–25)
CALCIUM SERPL-MCNC: 9.5 MG/DL (ref 8.3–10.1)
CHLORIDE SERPL-SCNC: 98 MMOL/L (ref 96–108)
CO2 SERPL-SCNC: 25 MMOL/L (ref 21–32)
CREAT SERPL-MCNC: 1.01 MG/DL (ref 0.6–1.3)
GFR SERPL CREATININE-BSD FRML MDRD: 78 ML/MIN/1.73SQ M
GLUCOSE SERPL-MCNC: 107 MG/DL (ref 65–140)
GLUCOSE SERPL-MCNC: 161 MG/DL (ref 65–140)
GLUCOSE SERPL-MCNC: 214 MG/DL (ref 65–140)
GLUCOSE SERPL-MCNC: 228 MG/DL (ref 65–140)
GLUCOSE SERPL-MCNC: 228 MG/DL (ref 65–140)
GLUCOSE SERPL-MCNC: 330 MG/DL (ref 65–140)
GLUCOSE SERPL-MCNC: 385 MG/DL (ref 65–140)
GLUCOSE SERPL-MCNC: 488 MG/DL (ref 65–140)
GLUCOSE SERPL-MCNC: 504 MG/DL (ref 65–140)
GLUCOSE SERPL-MCNC: >500 MG/DL (ref 65–140)
GLUCOSE SERPL-MCNC: >500 MG/DL (ref 65–140)
POTASSIUM SERPL-SCNC: 4.5 MMOL/L (ref 3.5–5.3)
SODIUM SERPL-SCNC: 132 MMOL/L (ref 135–147)

## 2022-11-02 RX ORDER — INSULIN LISPRO 100 [IU]/ML
1-5 INJECTION, SOLUTION INTRAVENOUS; SUBCUTANEOUS
Status: DISCONTINUED | OUTPATIENT
Start: 2022-11-02 | End: 2022-11-03 | Stop reason: HOSPADM

## 2022-11-02 RX ORDER — INSULIN LISPRO 100 [IU]/ML
1-5 INJECTION, SOLUTION INTRAVENOUS; SUBCUTANEOUS
Status: DISCONTINUED | OUTPATIENT
Start: 2022-11-02 | End: 2022-11-02

## 2022-11-02 RX ORDER — INSULIN LISPRO 100 [IU]/ML
1-5 INJECTION, SOLUTION INTRAVENOUS; SUBCUTANEOUS
Status: DISCONTINUED | OUTPATIENT
Start: 2022-11-03 | End: 2022-11-03 | Stop reason: HOSPADM

## 2022-11-02 RX ORDER — INSULIN LISPRO 100 [IU]/ML
1-6 INJECTION, SOLUTION INTRAVENOUS; SUBCUTANEOUS
Status: DISCONTINUED | OUTPATIENT
Start: 2022-11-02 | End: 2022-11-03 | Stop reason: HOSPADM

## 2022-11-02 RX ORDER — PREDNISONE 20 MG/1
40 TABLET ORAL DAILY
Status: DISCONTINUED | OUTPATIENT
Start: 2022-11-03 | End: 2022-11-03 | Stop reason: HOSPADM

## 2022-11-02 RX ORDER — INSULIN GLARGINE 100 [IU]/ML
70 INJECTION, SOLUTION SUBCUTANEOUS EVERY 12 HOURS SCHEDULED
Status: DISCONTINUED | OUTPATIENT
Start: 2022-11-02 | End: 2022-11-03 | Stop reason: HOSPADM

## 2022-11-02 RX ORDER — INSULIN LISPRO 100 [IU]/ML
10 INJECTION, SOLUTION INTRAVENOUS; SUBCUTANEOUS
Status: DISCONTINUED | OUTPATIENT
Start: 2022-11-02 | End: 2022-11-02

## 2022-11-02 RX ORDER — INSULIN LISPRO 100 [IU]/ML
10 INJECTION, SOLUTION INTRAVENOUS; SUBCUTANEOUS ONCE
Status: COMPLETED | OUTPATIENT
Start: 2022-11-02 | End: 2022-11-02

## 2022-11-02 RX ORDER — INSULIN LISPRO 100 [IU]/ML
15 INJECTION, SOLUTION INTRAVENOUS; SUBCUTANEOUS
Status: DISCONTINUED | OUTPATIENT
Start: 2022-11-02 | End: 2022-11-03

## 2022-11-02 RX ADMIN — IPRATROPIUM BROMIDE 0.5 MG: 0.5 SOLUTION RESPIRATORY (INHALATION) at 19:40

## 2022-11-02 RX ADMIN — SERTRALINE HYDROCHLORIDE 50 MG: 50 TABLET ORAL at 08:23

## 2022-11-02 RX ADMIN — INSULIN LISPRO 10 UNITS: 100 INJECTION, SOLUTION INTRAVENOUS; SUBCUTANEOUS at 18:48

## 2022-11-02 RX ADMIN — DOXYCYCLINE 100 MG: 100 CAPSULE ORAL at 21:55

## 2022-11-02 RX ADMIN — ISODIUM CHLORIDE 3 ML: 0.03 SOLUTION RESPIRATORY (INHALATION) at 07:35

## 2022-11-02 RX ADMIN — BUSPIRONE HYDROCHLORIDE 10 MG: 10 TABLET ORAL at 17:01

## 2022-11-02 RX ADMIN — ISODIUM CHLORIDE 3 ML: 0.03 SOLUTION RESPIRATORY (INHALATION) at 19:40

## 2022-11-02 RX ADMIN — TORSEMIDE 20 MG: 20 TABLET ORAL at 08:25

## 2022-11-02 RX ADMIN — Medication 1 TABLET: at 08:24

## 2022-11-02 RX ADMIN — APIXABAN 5 MG: 5 TABLET, FILM COATED ORAL at 17:01

## 2022-11-02 RX ADMIN — IPRATROPIUM BROMIDE 0.5 MG: 0.5 SOLUTION RESPIRATORY (INHALATION) at 13:38

## 2022-11-02 RX ADMIN — INSULIN LISPRO 6 UNITS: 100 INJECTION, SOLUTION INTRAVENOUS; SUBCUTANEOUS at 16:56

## 2022-11-02 RX ADMIN — CYCLOBENZAPRINE HYDROCHLORIDE 10 MG: 10 TABLET, FILM COATED ORAL at 12:25

## 2022-11-02 RX ADMIN — DIGOXIN 250 MCG: 125 TABLET ORAL at 08:24

## 2022-11-02 RX ADMIN — GABAPENTIN 600 MG: 300 CAPSULE ORAL at 08:25

## 2022-11-02 RX ADMIN — INSULIN LISPRO 4 UNITS: 100 INJECTION, SOLUTION INTRAVENOUS; SUBCUTANEOUS at 21:55

## 2022-11-02 RX ADMIN — LOSARTAN POTASSIUM 50 MG: 50 TABLET, FILM COATED ORAL at 08:23

## 2022-11-02 RX ADMIN — BUSPIRONE HYDROCHLORIDE 10 MG: 10 TABLET ORAL at 08:23

## 2022-11-02 RX ADMIN — QUETIAPINE FUMARATE 100 MG: 50 TABLET, EXTENDED RELEASE ORAL at 08:29

## 2022-11-02 RX ADMIN — APIXABAN 5 MG: 5 TABLET, FILM COATED ORAL at 08:23

## 2022-11-02 RX ADMIN — Medication 1000 UNITS: at 08:25

## 2022-11-02 RX ADMIN — ATORVASTATIN CALCIUM 80 MG: 80 TABLET, FILM COATED ORAL at 16:58

## 2022-11-02 RX ADMIN — LEVALBUTEROL HYDROCHLORIDE 0.63 MG: 0.63 SOLUTION RESPIRATORY (INHALATION) at 23:48

## 2022-11-02 RX ADMIN — METHYLPREDNISOLONE SODIUM SUCCINATE 40 MG: 40 INJECTION, POWDER, FOR SOLUTION INTRAMUSCULAR; INTRAVENOUS at 08:23

## 2022-11-02 RX ADMIN — POTASSIUM CHLORIDE 20 MEQ: 1500 TABLET, EXTENDED RELEASE ORAL at 08:24

## 2022-11-02 RX ADMIN — IPRATROPIUM BROMIDE 0.5 MG: 0.5 SOLUTION RESPIRATORY (INHALATION) at 07:35

## 2022-11-02 RX ADMIN — CYCLOBENZAPRINE HYDROCHLORIDE 10 MG: 10 TABLET, FILM COATED ORAL at 21:57

## 2022-11-02 RX ADMIN — LEVALBUTEROL HYDROCHLORIDE 1.25 MG: 1.25 SOLUTION, CONCENTRATE RESPIRATORY (INHALATION) at 07:35

## 2022-11-02 RX ADMIN — METOPROLOL SUCCINATE 200 MG: 100 TABLET, EXTENDED RELEASE ORAL at 08:25

## 2022-11-02 RX ADMIN — PANTOPRAZOLE SODIUM 40 MG: 40 TABLET, DELAYED RELEASE ORAL at 05:19

## 2022-11-02 RX ADMIN — INSULIN LISPRO 10 UNITS: 100 INJECTION, SOLUTION INTRAVENOUS; SUBCUTANEOUS at 12:25

## 2022-11-02 RX ADMIN — LEVALBUTEROL HYDROCHLORIDE 1.25 MG: 1.25 SOLUTION, CONCENTRATE RESPIRATORY (INHALATION) at 13:38

## 2022-11-02 RX ADMIN — FLUTICASONE FUROATE AND VILANTEROL TRIFENATATE 1 PUFF: 100; 25 POWDER RESPIRATORY (INHALATION) at 08:29

## 2022-11-02 RX ADMIN — DOXYCYCLINE 100 MG: 100 CAPSULE ORAL at 08:28

## 2022-11-02 RX ADMIN — INSULIN GLARGINE 65 UNITS: 100 INJECTION, SOLUTION SUBCUTANEOUS at 08:24

## 2022-11-02 RX ADMIN — INSULIN LISPRO 15 UNITS: 100 INJECTION, SOLUTION INTRAVENOUS; SUBCUTANEOUS at 16:56

## 2022-11-02 RX ADMIN — OMEGA-3 FATTY ACIDS CAP 1000 MG 1000 MG: 1000 CAP at 17:01

## 2022-11-02 RX ADMIN — GABAPENTIN 600 MG: 300 CAPSULE ORAL at 21:55

## 2022-11-02 RX ADMIN — OMEGA-3 FATTY ACIDS CAP 1000 MG 1000 MG: 1000 CAP at 08:22

## 2022-11-02 RX ADMIN — INSULIN LISPRO 4 UNITS: 100 INJECTION, SOLUTION INTRAVENOUS; SUBCUTANEOUS at 12:25

## 2022-11-02 RX ADMIN — QUETIAPINE FUMARATE 300 MG: 100 TABLET ORAL at 21:55

## 2022-11-02 RX ADMIN — LEVALBUTEROL HYDROCHLORIDE 1.25 MG: 1.25 SOLUTION, CONCENTRATE RESPIRATORY (INHALATION) at 19:39

## 2022-11-02 RX ADMIN — ALPRAZOLAM 2 MG: 0.5 TABLET ORAL at 21:55

## 2022-11-02 RX ADMIN — ASPIRIN 81 MG: 81 TABLET, COATED ORAL at 08:25

## 2022-11-02 RX ADMIN — OXYCODONE HYDROCHLORIDE AND ACETAMINOPHEN 1000 MG: 500 TABLET ORAL at 08:22

## 2022-11-02 RX ADMIN — GABAPENTIN 600 MG: 300 CAPSULE ORAL at 16:57

## 2022-11-02 RX ADMIN — GUAIFENESIN 1200 MG: 600 TABLET, EXTENDED RELEASE ORAL at 17:01

## 2022-11-02 RX ADMIN — ISODIUM CHLORIDE 3 ML: 0.03 SOLUTION RESPIRATORY (INHALATION) at 13:38

## 2022-11-02 RX ADMIN — GUAIFENESIN 1200 MG: 600 TABLET, EXTENDED RELEASE ORAL at 08:21

## 2022-11-02 RX ADMIN — INSULIN GLARGINE 70 UNITS: 100 INJECTION, SOLUTION SUBCUTANEOUS at 21:55

## 2022-11-02 NOTE — ASSESSMENT & PLAN NOTE
Patient presented with worsening SOB and wet cough since Friday  Denied fever, chills, runny nose, sore throat  Reports using oxygen 3L chronically at home, use BiPAP 12/5 with oxygen 3L at bedtime     · Currently on 3L oxygen  · COVID-19/flu/RSV negative  · Chest x-ray with mild vascular congestion, ProBNP 510  · Suspect multifactorial secondary to COPD exacerbation, acute on chronic CHF  · Received one hour neb, DuoNebs, Solu-Medrol 60mg, cefepime in ED  · Treat underlying causes as above

## 2022-11-02 NOTE — ASSESSMENT & PLAN NOTE
On Eliquis, digoxin, Toprol at home  · EKG showed uncontrolled AFib, rate 114 her prior provider  · Currently rate controlled  · digoxin level 0 7  · Continue home medications

## 2022-11-02 NOTE — ASSESSMENT & PLAN NOTE
History of bipolar, depression with anxiety      Continue Zoloft, Seroquel, BuSpar, Xanax at home  · Verified at [facility] website by prior provider

## 2022-11-02 NOTE — ASSESSMENT & PLAN NOTE
Lab Results   Component Value Date    EGFR 78 11/02/2022    EGFR 73 11/01/2022    EGFR 54 10/31/2022    CREATININE 1 01 11/02/2022    CREATININE 1 07 11/01/2022    CREATININE 1 37 (H) 10/31/2022     Baseline creatinine appears to be around 0 7-1 0  · Creatinine mildly higher on 10/31 but now within normal limits

## 2022-11-02 NOTE — ASSESSMENT & PLAN NOTE
Follows hematology as outpatient   Currently under surveillance  · WBC 20 18 on admission --> 28, partly steroid mediated

## 2022-11-02 NOTE — ASSESSMENT & PLAN NOTE
Wt Readings from Last 3 Encounters:   11/02/22 117 kg (257 lb 15 oz)   09/28/22 126 kg (277 lb 3 2 oz)   07/28/22 118 kg (260 lb)     Cardiology Telemedicine note in July 2022 indicated patient is to use torsemide intermittently and use daily if weight gain  · States he was taking Demadex daily at home, but unsure dose  · 2D echo in 2/2022 showed EF 55%, dilated atriums, no significant valvular disease    · ProBNP, chest x-ray as above  · Was on Lasix 40 mg IV daily but has been transitioned to 20 mg of Demadex daily  · Cardiac diet, liberalize fluid restriction  · Daily weight, I&Os  · Cardiology input appreciated

## 2022-11-02 NOTE — ASSESSMENT & PLAN NOTE
Lab Results   Component Value Date    HGBA1C 9 2 (H) 09/29/2022       Recent Labs     11/02/22  0731 11/02/22  1148 11/02/22  1517 11/02/22  1519   POCGLU 161* 330* >500* >500*     On Basaglar 60 units subQ b i d , NovoLog SSI, metformin, Victoza at home  Per patient he takes NovoLog up to 30 units at times based on his blood sugars  · Discontinued Lantus, Humalog 10/31 as blood sugars were persistently elevated  · Was on non DKA insulin drip with improvement in blood sugars  As plan was to discharge patient home today, transitioned off insulin drip and was placed on 65 units of Lantus along with scheduled Humalog with meals  · blood sugar this evening extremely elevated to 504, most likely due to steroids he received this a m   · Increase Lantus to 70 units b i d   And Humalog increased to 15 units along with sliding scale insulin  · Check blood sugar now and also at 2:00 a m   · Hold metformin and Victoza

## 2022-11-02 NOTE — PROGRESS NOTES
Kit U  65   Progress Note Mathieu Fisher 1960, 60 y o  male MRN: [id number]  Unit/Bed#: [address] [place] [address] Encounter: [id number]  Primary Care Provider: Afshan Chavira MD   Date and time admitted to hospital: 10/30/2022  8:26 PM    * COPD with exacerbation [facility])  Assessment & Plan  On Trelegy, albuterol inhaler and nebulizer p r n  at home  · Received IV Solu-Medrol 60 mg in ED, was decreased to 40 mg Solu-Medrol q 12 from q 8 hours  · Received 40 mg of IV Solu-Medrol x1 today with plan to transition to prednisone starting tomorrow  · Continue doxycycline in view of wet cough( avoiding Zithromax due to interaction with digoxin) for total of 7 days  · Mucinex  · Xopenex+ Atrovent t i d , Xopenex p r n  · Substituted Trelegy with Sonia Patel  · Pulmonary input appreciated    Acute on chronic diastolic congestive heart failure [facility])  Assessment & Plan  Wt Readings from Last 3 Encounters:   11/02/22 117 kg (257 lb 15 oz)   09/28/22 126 kg (277 lb 3 2 oz)   07/28/22 118 kg (260 lb)     Cardiology Telemedicine note in July 2022 indicated patient is to use torsemide intermittently and use daily if weight gain  · States he was taking Demadex daily at home, but unsure dose  · 2D echo in 2/2022 showed EF 55%, dilated atriums, no significant valvular disease  · ProBNP, chest x-ray as above  · Was on Lasix 40 mg IV daily but has been transitioned to 20 mg of Demadex daily  · Cardiac diet, liberalize fluid restriction  · Daily weight, I&Os  · Cardiology input appreciated            Type 2 diabetes mellitus with complication, with long-term current use of insulin [facility])  Assessment & Plan  Lab Results   Component Value Date    HGBA1C 9 2 (H) 09/29/2022       Recent Labs     11/02/22  0731 11/02/22  1148 11/02/22  1517 11/02/22  1519   POCGLU 161* 330* >500* >500*     On Basaglar 60 units subQ b i d , NovoLog SSI, metformin, Victoza at home    Per patient he takes NovoLog up to 30 units at times based on his blood sugars  · Discontinued Lantus, Humalog 10/31 as blood sugars were persistently elevated  · Was on non DKA insulin drip with improvement in blood sugars  As plan was to discharge patient home today, transitioned off insulin drip and was placed on 65 units of Lantus along with scheduled Humalog with meals  · blood sugar this evening extremely elevated to 504, most likely due to steroids he received this a m   · Increase Lantus to 70 units b i d  And Humalog increased to 15 units along with sliding scale insulin  · Check blood sugar now and also at 2:00 a m   · Hold metformin and Victoza    Chronic respiratory failure [facility])  Assessment & Plan  Patient presented with worsening SOB and wet cough since Friday  Denied fever, chills, runny nose, sore throat  Reports using oxygen 3L chronically at home, use BiPAP 12/5 with oxygen 3L at bedtime  · Currently on 3L oxygen  · COVID-19/flu/RSV negative  · Chest x-ray with mild vascular congestion, ProBNP 510  · Suspect multifactorial secondary to COPD exacerbation, acute on chronic CHF  · Received one hour neb, DuoNebs, Solu-Medrol 60mg, cefepime in ED  · Treat underlying causes as above    CLL (chronic lymphocytic leukemia) ([facility] [address] )  Assessment & Plan  Follows hematology as outpatient  Currently under surveillance  · WBC 20 18 on admission --> 28, partly steroid mediated    Chronic a-fib (HCC)  Assessment & Plan  On Eliquis, digoxin, Toprol at home  · EKG showed uncontrolled AFib, rate 114 her prior provider  · Currently rate controlled  · digoxin level 0 7  · Continue home medications      Stage 2 chronic kidney disease  Assessment & Plan  Lab Results   Component Value Date    EGFR 78 11/02/2022    EGFR 73 11/01/2022    EGFR 54 10/31/2022    CREATININE 1 01 11/02/2022    CREATININE 1 07 11/01/2022    CREATININE 1 37 (H) 10/31/2022     Baseline creatinine appears to be around 0 7-1 0  · Creatinine mildly higher on 10/31 but now within normal limits    Dyslipidemia  Assessment & Plan  Continue statin    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 [facility])  Assessment & Plan  Body mass index is 35 98 kg/m²  Diet and lifestyle modification    Essential hypertension  Assessment & Plan  Continue losartan, Toprol-XL   · BPs stable overall    Chronic low back pain  Assessment & Plan  Continue Neurontin, Flexeril p r n    Esophageal reflux  Assessment & Plan  Continue Protonix    Coronary artery disease  Assessment & Plan  Denies history of cardiac stents  · Continue Toprol-XL, aspirin, Lipitor    SHAVONNE and COPD overlap syndrome   Assessment & Plan  Continue BiPAP 12/5, with oxygen at bedtime    Psychiatric disorder  Assessment & Plan  History of bipolar, depression with anxiety  Continue Zoloft, Seroquel, BuSpar, Xanax at home  · Verified at [employer] website by prior provider      Hyponatremia-resolved as of 11/1/2022  Assessment & Plan  Sodium 124 --> 137 today  · Partly hyponatremia from hyperglycemia  · IV Lasix as above  · uric acid normal  urine sodium, urine Osm 229, serum osmol 275  · TSH low, free T4 within normal limits  Repeat lab work as outpatient      VTE Pharmacologic Prophylaxis: VTE Score: 6 High Risk (Score >/= 5) - Pharmacological DVT Prophylaxis Ordered: apixaban (Eliquis)  Sequential Compression Devices Ordered  Patient Centered Rounds: I performed bedside rounds with nursing staff today  Discussions with Specialists or Other Care Team Provider: yes - pulm    Education and Discussions with Family / Patient: Updated  (friend) via phone  Time Spent for Care: 35 min  More than 50% of total time spent on counseling and coordination of care as described above      Current Length of Stay: 3 day(s)  Current Patient Status: Inpatient   Certification Statement: The patient will continue to require additional inpatient hospital stay due to Uncontrolled diabetes requiring adjustment of insulin  Discharge Plan: Anticipate discharge in 24-48 hrs to home with home services  Code Status: Level 3 - DNAR and DNI    Subjective:     Patient states breathing has improved and back at baseline  Feels ready to go home however understands that he needs to stay due to his sugars    Objective:     Vitals:   Temp (24hrs), Av 1 °F (36 7 °C), Min:97 8 °F (36 6 °C), Max:98 4 °F (36 9 °C)    Temp:  [97 8 °F (36 6 °C)-98 4 °F (36 9 °C)] 97 8 °F (36 6 °C)  HR:  [] 103  Resp:  [18-20] 20  BP: (123-144)/(79-84) 142/82  SpO2:  [92 %-96 %] 94 %  Body mass index is 35 98 kg/m²  Input and Output Summary (last 24 hours): Intake/Output Summary (Last 24 hours) at 2022 1814  Last data filed at 2022 0518  Gross per 24 hour   Intake 500 ml   Output 1275 ml   Net -775 ml       Physical Exam:   Physical Exam  Vitals reviewed  Constitutional:       General: He is not in acute distress  Appearance: He is not ill-appearing  HENT:      Head: Normocephalic and atraumatic  Eyes:      General:         Right eye: No discharge  Left eye: No discharge  Cardiovascular:      Rate and Rhythm: Normal rate  Rhythm irregular  Pulmonary:      Effort: Pulmonary effort is normal  No respiratory distress  Breath sounds: No wheezing or rales  Comments: Decreased breath sounds bilaterally  Abdominal:      General: Bowel sounds are normal  There is no distension  Palpations: Abdomen is soft  Tenderness: There is no abdominal tenderness  Neurological:      Mental Status: He is alert and oriented to person, place, and time            Additional Data:     Labs:  Results from last 7 days   Lab Units 22  0523 10/30/22  2053   WBC Thousand/uL 28 56* 20 18*   HEMOGLOBIN g/dL 13 3 12 4   HEMATOCRIT % 40 1 36 0*   PLATELETS Thousands/uL 236 164   BANDS PCT %  --  8   LYMPHO PCT %  --  30   MONO PCT %  --  4   EOS PCT %  --  0     Results from last 7 days   Lab Units 22  1535 11/02/22  0521 10/31/22  0528 10/30/22  2053   SODIUM mmol/L  --  132*   < > 124*   POTASSIUM mmol/L  --  4 5   < > 4 3   CHLORIDE mmol/L  --  98   < > 90*   CO2 mmol/L  --  25   < > 24   BUN mg/dL  --  29*   < > 24   CREATININE mg/dL  --  1 01   < > 1 09   ANION GAP mmol/L  --  9   < > 10   CALCIUM mg/dL  --  9 5   < > 8 9   ALBUMIN g/dL  --   --   --  3 5   TOTAL BILIRUBIN mg/dL  --   --   --  1 20*   ALK PHOS U/L  --   --   --  53   ALT U/L  --   --   --  20   GLUCOSE RANDOM mg/dL 504* 214*   < > 243*    < > = values in this interval not displayed  Results from last 7 days   Lab Units 10/30/22  2053   INR  1 43*     Results from last 7 days   Lab Units 11/02/22  1519 11/02/22  1517 11/02/22  1148 11/02/22  0731 11/02/22  0520 11/02/22  0331 11/02/22  0135 11/01/22  2309 11/01/22  2112 11/01/22  1842 11/01/22  1703 11/01/22  1608   POC GLUCOSE mg/dl >500* >500* 330* 161* 228* 228* 107 134 221* 296* 193* 114         Results from last 7 days   Lab Units 10/31/22  0528 10/30/22  2129 10/30/22  2053   LACTIC ACID mmol/L  --  1 7  --    PROCALCITONIN ng/ml 0 35*  --  0 39*       Lines/Drains:  Invasive Devices  Report    Peripheral Intravenous Line  Duration           Peripheral IV 10/31/22 Dorsal (posterior); Right Hand 2 days              Imaging: No pertinent imaging reviewed  Recent Cultures (last 7 days):   Results from last 7 days   Lab Units 10/30/22  2130 10/30/22  2129   BLOOD CULTURE  No Growth at 48 hrs  No Growth at 48 hrs         Last 24 Hours Medication List:   Current Facility-Administered Medications   Medication Dose Route Frequency Provider Last Rate   • acetaminophen  650 mg Oral Q6H PRN MANAS Yoder     • ALPRAZolam  2 mg Oral HS MANAS Yoder     • apixaban  5 mg Oral BID MANAS Yoder     • vitamin C  1,000 mg Oral Daily MANAS Yoder     • aspirin  81 mg Oral Daily MANAS Yoder     • atorvastatin  80 mg Oral Daily With MANAS Arnold     • busPIRone  10 mg Oral BID MANAS Yoder     • cholecalciferol  1,000 Units Oral Daily MANAS Yoder     • cyclobenzaprine  10 mg Oral TID PRN MANAS Yoder     • digoxin  250 mcg Oral Daily MANAS Yoder     • doxycycline hyclate  100 mg Oral Q12H [address] MANAS Yoder     • fish oil  1,000 mg Oral BID MANAS Yoder     • Fluticasone Furoate-Vilanterol  1 puff Inhalation Daily MANAS Yoder     • gabapentin  600 mg Oral TID MANAS Yoder     • guaiFENesin  1,200 mg Oral BID MANAS Yoder     • insulin glargine  70 Units Subcutaneous Q12H [address] Jeri Revtimothyar, DO     • insulin lispro  1-5 Units Subcutaneous HS Jeri Revtimothyar, DO     • [START ON 11/3/2022] insulin lispro  1-5 Units Subcutaneous 0200 Jeri Revtimothyar, DO     • insulin lispro  1-6 Units Subcutaneous TID AC Jeri Revtimothyar, DO     • insulin lispro  15 Units Subcutaneous TID With Meals Jeri Baileyar, DO     • ipratropium  0 5 mg Nebulization TID MANAS Yoder     • levalbuterol  0 63 mg Nebulization Q4H PRN MANAS Yoder     • levalbuterol  1 25 mg Nebulization TID MANAS Yoder      And   • sodium chloride  3 mL Nebulization TID MANAS Yoder     • losartan  50 mg Oral Daily MANAS Yoder     • metoprolol succinate  200 mg Oral Daily MANAS Yoder     • multivitamin-minerals  1 tablet Oral Daily MANAS Yoder     • pantoprazole  40 mg Oral Early Morning MANAS Yoder     • potassium chloride  20 mEq Oral Daily MANAS Yoder     • [START ON 11/3/2022] predniSONE  40 mg Oral Daily Jeri Pfeiffer, DO     • QUEtiapine  100 mg Oral QAM MANAS Yoder     • QUEtiapine  300 mg Oral HS MANAS Yoder     • sertraline  50 mg Oral Daily MANAS Yoder     • torsemide  20 mg Oral Daily MANAS Johnson Caro          Today, Patient Was Seen By: Odette Sharpe    **Please Note: This note may have been constructed using a voice recognition system  **

## 2022-11-02 NOTE — OCCUPATIONAL THERAPY NOTE
Occupational Therapy Evaluation/Treatment       11/02/22 1230   Note Type   Note type Evaluation   Additional Comments worsening SOB and wet cough x several days   Pain Assessment   Pain Assessment Tool 0-10   Pain Score 8   Pain Location/Orientation Orientation: Lower; Location: Back   Hospital Pain Intervention(s) Repositioned; Ambulation/increased activity; Emotional support  Rosa Oconnor RN madw aware)   Restrictions/Precautions   Other Precautions Fall Risk;Pain;O2   Home Living   Type of Home Apartment  (2nd floor)   Home Layout One level;Elevator   Bathroom Shower/Tub Walk-in shower   Bathroom Toilet Raised   Bathroom Equipment Grab bars in shower; Shower chair;Hand-held shower; Toilet raiser;Grab bars around toilet   [address] scooter;Walker;Cane;Reacher  (LH sponge, 3L O2 24/7)   Additional Comments Functional mobility primarily with electric scooter, but can walk short distances with cane or RW in home   Prior Function   Level of [place] Needs assistance with ADLs; Needs assistance with IADLS;Modified independent with wheelchair   Lives With [address] [facility] [place] [facility]  (5 days a week for cleaning, shopping, cooking and some ADL assistance)   IADLs Family/Friend/Other provides transportation; Family/Friend/Other provides meals; Independent with medication management   Falls in the last 6 months 0   Subjective   Subjective "I'm close to my normal"   ADL   Where Assessed Chair   Eating Assistance 7  Independent   Grooming Assistance 5  Supervision/Setup   [address]  [facility]  5  Supervision/Setup   Functional Deficit Setup;Steadying;Supervision/safety; Increased time to complete   Additional Comments for all ADLS due to SOB, decreased strength, decreased endurance and decreased balance at this time   Bed Mobility   Supine to Sit 5  Supervision   Transfers   Sit to Stand 6  Modified independent   Additional items Assist x 1   Stand to Sit 6  Modified independent   Additional items Assist x 1   Toilet transfer 6  Modified independent   Additional items Assist x 1   Balance   Static Sitting Good   Dynamic Sitting Fair +   Static Standing Good   Dynamic Standing Fair  (with cane)   Ambulatory Fair  (with cane; short distance)   Activity Tolerance   Activity Tolerance Patient limited by fatigue;Patient limited by pain   Nurse Made Aware Nanette Overton RN   RUE Assessment   RUE Assessment WFL  (shoulder flexion 30)   LUE Assessment   LUE Assessment WFL  (shoulder flexion 30)   Hand Function   Gross Motor Coordination Functional   Fine Motor Coordination Functional   Vision-Basic Assessment   Current Vision Wears glasses all the time   Psychosocial   Psychosocial (WDL) WDL   Cognition   Overall Cognitive Status WFL   Arousal/Participation Alert; Cooperative   Attention Within functional limits   Orientation Level Oriented X4   Memory Within functional limits   Following Commands Follows all commands and directions without difficulty   Assessment   Limitation Decreased ADL status; Decreased UE ROM; Decreased UE strength;Decreased Safe judgement during ADL;Decreased endurance;Decreased self-care trans;Decreased high-level ADLs  (decreased balance and mobility)   Prognosis Good   Assessment Patient evaluated by Occupational Therapy  Patient admitted with COPD with exacerbation ([facility] [address] )  The patients occupational profile, medical and therapy history includes a extensive additional review of physical, cognitive, or psychosocial history related to current functional performance    Comorbidities affecting functional mobility and ADLS include: chronic respiratory failure, O2 dependent at home, COPD, CHF, chronic AFib, CAD, chronic back pain, type 2 diabetes, CLL, obesity, hypertension, GERD, CKD 2, sleep apnea, depression, anxiety, and bipolar  Prior to admission, patient was independent with functional mobility with cane/electric wheelchair, mostly independent with ADLS and requiring assist for IADLS  The evaluation identifies the following performance deficits: weakness, impaired balance, decreased endurance, increased fall risk, new onset of impairment of functional mobility, decreased ADLS, decreased IADLS, pain, decreased activity tolerance, decreased safety awareness, impaired judgement and decreased strength, that result in activity limitations and/or participation restrictions  This evaluation requires clinical decision making of high complexity, because the patient presents with comorbidites that affect occupational performance and required significant modification of tasks or assistance with consideration of multiple treatment options  The Barthel Index was used as a functional outcome tool presenting with a score of Barthel Index Score: 55, indicating marked limitations of functional mobility and ADLS  The patient's raw score on the -PAC Daily Activity inpatient short form is 22, standardized score is 47 1, which is greater than 39 4  Patients at this level are likely to benefit from DC to home  Please refer to the recommendation of the Occupational Therapist for safe DC planning  Patient will benefit from skilled Occupational Therapy services to address above deficits and facilitate a safe return to prior level of function  Goals   Patient Goals "go home stronger than I came in"   STG Time Frame   (1-7)   Short Term Goal #1 Grooming: supervision standing at sink; Bathing: supervision; Upper Body Dressing independent; Lower Body Dressing: supervision; Toileting: independent; Patient will increase standing tolerance to 5 minutes during ADL task to decrease assistance level and decrease fall risk; Patient will increase bed mobility to independent in preparation for ADLS and transfers;  Patient will increase functional mobility to and from bathroom with single point cane independently to increase performance with ADLS and to use a toilet; Patient will tolerate 10 minutes of UE ROM/strengthening to increase general activity tolerance and performance in ADLS/IADLS; Patient will improve functional activity tolerance to 10 minutes of sustained functional tasks to increase participation in basic self-care and decrease assistance level;  Patient will be able to to verbalize understanding and perform energy conservation/proper body mechanics during ADLS and functional mobility at least 75% of the time with minimal cueing to decrease signs of fatigue and increase stamina to return to prior level of function; Patient will increase dynamic sitting balance to good to improve the ability to sit at edge of bed or on a chair for ADLS;  Patient will increase dynamic standing balance to fair+ to improve postural stability and decrease fall risk during standing ADLS and transfers  LTG Time Frame   (8-14)   Long Term Goal #1 Grooming: independent standing at sink; Bathing: independent;  Lower Body Dressing: independent;  Patient will increase standing tolerance to 8 minutes during ADL task to decrease assistance level and decrease fall risk; Patient will increase functional mobility to and from bathroom with single point cane independently to increase performance with ADLS and to use a toilet; Patient will improve functional activity tolerance to 15 minutes of sustained functional tasks to increase participation in basic self-care and decrease assistance level;  Patient will be able to to verbalize understanding and perform energy conservation/proper body mechanics during ADLS and functional mobility at least 90% of the time with no cueing to decrease signs of fatigue and increase stamina to return to prior level of function; Patient will increase dynamic standing balance to fair+ to improve postural stability and decrease fall risk during standing ADLS and transfers  Pt will score >/= 24/24 on AM-PAC Daily Activity Inpatient scale to promote safe independence with ADLs and functional mobility; Pt will score >/= 70/100 on Barthel Index in order to decrease caregiver assistance needed and increase ability to perform ADLs and functional mobility  Functional Transfer Goals   Pt Will Perform All Functional Transfers Independently; With assistive devices; With good judgment/safety   Plan   Treatment Interventions ADL retraining;Functional transfer training;UE strengthening/ROM; Patient/family training;Equipment evaluation/education; Compensatory technique education; Energy conservation; Activityengagement   Goal Expiration Date 11/16/22   OT Frequency 3-5x/wk   Recommendation   OT Discharge Recommendation Home with home health rehabilitation   [facility]   Lower Body Dressing 3   Bathing 3   Toileting 4   Upper Body Dressing 4   Grooming 4   Eating 4   Daily Activity Raw Score 22   Daily Activity Standardized Score (Calc for Raw Score >=11) 47  1   AM-[facility] Applied Cognition Inpatient   Following a Speech/Presentation 4   Understanding Ordinary Conversation 4   Taking Medications 4   Remembering Where Things Are Placed or Put Away 4   Remembering List of 4-5 Errands 4   Taking Care of Complicated Tasks 4   Applied Cognition Raw Score 24   Applied Cognition Standardized Score 62 21   Barthel Index   Feeding 10   Bathing 0   Grooming Score 0   Dressing Score 5   Bladder Score 10   Bowels Score 10   Toilet Use Score 10   Transfers (Bed/Chair) Score 10   Mobility (Level Surface) Score 0   Stairs Score 0   Barthel Index Score 55   Additional Treatment Session   Start Time 1220   End Time 1230   Treatment Assessment Pt seen for ADL training   Education and training with teachback method begun with pt regarding energy conservation/proper body mechanics during ADLS and functional mobility to decrease fall risks, decrease signs of fatigue and increase stamina needed to return to prior level of function  Functional ambulation 10 feet to bathroom with mod independence and SPC  Toileting independent with fair+ safety awareness about O2 line management to prevent falls  Pt able to stand at sink for 1 minute to wash hands and face  Min SOB with exertion requiring cues for deep, pursed lip breathing  Provided cardiac education and emotional support to pt  Pt demonstrating good verbal understanding of all information provided and all questions answered  Patient left OOB in chair with all needs within reach  Continue OT per POC  End of Consult   Education Provided Yes   Patient Position at End of Consult Bedside chair   Nurse Communication Nurse aware of consult   Licensure   [place] License Number  Doyce Cruzito [address], OTR/L, [place] Lic# [id number]

## 2022-11-02 NOTE — PLAN OF CARE
Problem: Potential for Falls  Goal: Patient will remain free of falls  Description: INTERVENTIONS:  - Educate patient/family on patient safety including physical limitations  - Instruct patient to call for assistance with activity   - Consult OT/PT to assist with strengthening/mobility   - Keep Call bell within reach  - Keep bed low and locked with side rails adjusted as appropriate  - Keep care items and personal belongings within reach  - Initiate and maintain comfort rounds  - Make Fall Risk Sign visible to staff  - Offer Toileting every 2 Hours, in advance of need  - Initiate/Maintain bed/chair alarm  - Obtain necessary fall risk management equipment: bed/chair alarm  - Apply yellow socks and bracelet for high fall risk patients  - Consider moving patient to room near nurses station  Outcome: Progressing     Problem: MOBILITY - ADULT  Goal: Maintain or return to baseline ADL function  Description: INTERVENTIONS:  -  Assess patient's ability to carry out ADLs; assess patient's baseline for ADL function and identify physical deficits which impact ability to perform ADLs (bathing, care of mouth/teeth, toileting, grooming, dressing, etc )  - Assess/evaluate cause of self-care deficits   - Assess range of motion  - Assess patient's mobility; develop plan if impaired  - Assess patient's need for assistive devices and provide as appropriate  - Encourage maximum independence but intervene and supervise when necessary  - Involve family in performance of ADLs  - Assess for home care needs following discharge   - Consider OT consult to assist with ADL evaluation and planning for discharge  - Provide patient education as appropriate  Outcome: Progressing  Goal: Maintains/Returns to pre admission functional level  Description: INTERVENTIONS:  - Perform BMAT or MOVE assessment daily    - Set and communicate daily mobility goal to care team and patient/family/caregiver     - Collaborate with rehabilitation services on mobility goals if consulted  - Perform Range of Motion 3 times a day  - Reposition patient every 3 hours  - Dangle patient 3 times a day  - Stand patient 3 times a day  - Ambulate patient 3 times a day  - Out of bed to chair 3 times a day   - Out of bed for meals 3 times a day  - Out of bed for toileting  - Record patient progress and toleration of activity level   Outcome: Progressing     Problem: PAIN - ADULT  Goal: Verbalizes/displays adequate comfort level or baseline comfort level  Description: Interventions:  - Encourage patient to monitor pain and request assistance  - Assess pain using appropriate pain scale  - Administer analgesics based on type and severity of pain and evaluate response  - Implement non-pharmacological measures as appropriate and evaluate response  - Consider cultural and social influences on pain and pain management  - Notify physician/advanced practitioner if interventions unsuccessful or patient reports new pain  Outcome: Progressing     Problem: SAFETY ADULT  Goal: Maintains/Returns to pre admission functional level  Description: INTERVENTIONS:  - Perform BMAT or MOVE assessment daily    - Set and communicate daily mobility goal to care team and patient/family/caregiver  - Collaborate with rehabilitation services on mobility goals if consulted  - Perform Range of Motion 3 times a day  - Reposition patient every 3 hours    - Dangle patient 3 times a day  - Stand patient 3 times a day  - Ambulate patient 3 times a day  - Out of bed to chair 3 times a day   - Out of bed for meals 3 times a day  - Out of bed for toileting  - Record patient progress and toleration of activity level   Outcome: Progressing     Problem: DISCHARGE PLANNING  Goal: Discharge to home or other facility with appropriate resources  Description: INTERVENTIONS:  - Identify barriers to discharge w/patient and caregiver  - Arrange for needed discharge resources and transportation as appropriate  - Identify discharge learning needs (meds, wound care, etc )  - Arrange for interpretive services to assist at discharge as needed  - Refer to Case Management Department for coordinating discharge planning if the patient needs post-hospital services based on physician/advanced practitioner order or complex needs related to functional status, cognitive ability, or social support system  Outcome: Progressing     Problem: Knowledge Deficit  Goal: Patient/family/caregiver demonstrates understanding of disease process, treatment plan, medications, and discharge instructions  Description: Complete learning assessment and assess knowledge base    Interventions:  - Provide teaching at level of understanding  - Provide teaching via preferred learning methods  Outcome: Progressing     Problem: RESPIRATORY - ADULT  Goal: Achieves optimal ventilation and oxygenation  Description: INTERVENTIONS:  - Assess for changes in respiratory status  - Assess for changes in mentation and behavior  - Position to facilitate oxygenation and minimize respiratory effort  - Oxygen administered by appropriate delivery if ordered  - Initiate smoking cessation education as indicated  - Encourage broncho-pulmonary hygiene including cough, deep breathe, Incentive Spirometry  - Assess the need for suctioning and aspirate as needed  - Assess and instruct to report SOB or any respiratory difficulty  - Respiratory Therapy support as indicated  Outcome: Progressing     Problem: METABOLIC, FLUID AND ELECTROLYTES - ADULT  Goal: Electrolytes maintained within normal limits  Description: INTERVENTIONS:  - Monitor labs and assess patient for signs and symptoms of electrolyte imbalances  - Administer electrolyte replacement as ordered  - Monitor response to electrolyte replacements, including repeat lab results as appropriate  - Instruct patient on fluid and nutrition as appropriate  Outcome: Progressing  Goal: Fluid balance maintained  Description: INTERVENTIONS:  - Monitor labs - Monitor I/O and WT  - Instruct patient on fluid and nutrition as appropriate  - Assess for signs & symptoms of volume excess or deficit  Outcome: Progressing  Goal: Glucose maintained within target range  Description: INTERVENTIONS:  - Monitor Blood Glucose as ordered  - Assess for signs and symptoms of hyperglycemia and hypoglycemia  - Administer ordered medications to maintain glucose within target range  - Assess nutritional intake and initiate nutrition service referral as needed  Outcome: Progressing     Problem: MUSCULOSKELETAL - ADULT  Goal: Maintain or return mobility to safest level of function  Description: INTERVENTIONS:  - Assess patient's ability to carry out ADLs; assess patient's baseline for ADL function and identify physical deficits which impact ability to perform ADLs (bathing, care of mouth/teeth, toileting, grooming, dressing, etc )  - Assess/evaluate cause of self-care deficits   - Assess range of motion  - Assess patient's mobility  - Assess patient's need for assistive devices and provide as appropriate  - Encourage maximum independence but intervene and supervise when necessary  - Involve family in performance of ADLs  - Assess for home care needs following discharge   - Consider OT consult to assist with ADL evaluation and planning for discharge  - Provide patient education as appropriate  Outcome: Progressing  Goal: Maintain proper alignment of affected body part  Description: INTERVENTIONS:  - Support, maintain and protect limb and body alignment  - Provide patient/ family with appropriate education  Outcome: Progressing

## 2022-11-02 NOTE — CASE MANAGEMENT
Case Management Progress Note    Patient name Neli Sotelo  Location 4 [facility] 420/4 JVZXU 869-* MRN [id number]  : 1960 Date 2022       LOS (days): 3  Geometric Mean LOS (GMLOS) (days): 3 90  Days to GMLOS:1 2        OBJECTIVE:     Current admission status: Inpatient  Preferred Pharmacy:   Replaced by [facility] [place] [id number]  Phone: [phone] Fax: [phone] [facility], [address] [facility] ([address])  [address] [address] [place] [address] 70 ([address])  [place] [address]  Phone: [phone] Fax: [phone]    [address] [facility] [address]  1306 Critical access hospital  [place] [address]  Phone: [phone] Fax: [phone]    Primary Care Provider: Marcelina Harris MD    Primary Insurance: [facility]  Secondary Insurance: [facility] [address] [employer]    PROGRESS NOTE:    SW notified by Attending and unit nurse that pt's BS was >500 therefore patient will not be cleared to leave today  [facility] transport canceled in [employer]  Rigose Gabriel from [facility] notified  MD signed [facility] orders so that agency can submit for prior authorization of visits

## 2022-11-02 NOTE — ASSESSMENT & PLAN NOTE
On Trelegy, albuterol inhaler and nebulizer p r n  at home  · Received IV Solu-Medrol 60 mg in ED, was decreased to 40 mg Solu-Medrol q 12 from q 8 hours  · Received 40 mg of IV Solu-Medrol x1 today with plan to transition to prednisone starting tomorrow  · Continue doxycycline in view of wet cough( avoiding Zithromax due to interaction with digoxin) for total of 7 days  · Mucinex  · Xopenex+ Atrovent t i d , Xopenex p r n    · Substituted Trelegy with Stephan Syed  · Pulmonary input appreciated

## 2022-11-02 NOTE — CASE MANAGEMENT
Case Management Discharge Planning Note    Patient name Atif Wing  Location [address] [facility] 420/4 Yoselin 5-* MRN [id number]  : 1960 Date 2022       Current Admission Date: 10/30/2022  Current Admission Diagnosis:COPD with exacerbation [facility])   Patient Active Problem List    Diagnosis Date Noted   • CLL (chronic lymphocytic leukemia) ([facility] 75 ) 2022   • Chronic pain syndrome 2022   • Foraminal stenosis of lumbar region 2022   • Lumbar post-laminectomy syndrome 2022   • Lumbar radiculopathy 2022   • Shortness of breath 2022   • SIRS (systemic inflammatory response syndrome) ([place] [address] ) 2022   • Dysuria 2021   • Peripheral neuropathy 2021   • Acute exacerbation of chronic low back pain 2021   • BMI 40 0-44 9, adult ([place] [address] ) 2021   • Muscle weakness (generalized) 2021   • Platelets decreased ([place] [address] ) 2021   • Acute on chronic diastolic congestive heart failure ([facility] ) 2020   • Hyperlipidemia 10/29/2020   • Nutritional anemia, unspecified  10/29/2020   • Chest pain 10/11/2020   • Simple chronic bronchitis ([facility] 75 ) 10/11/2020   • Hyperglycemia 2020   • Transition of care performed with sharing of clinical summary 2020   • Obstructive sleep apnea 2020   • Obesity (BMI 30-39 9) 2020   • Stage 2 chronic kidney disease 2020   • COPD (chronic obstructive pulmonary disease) ([facility] ) 2020   • Chronic a-fib ([place] [address] ) 2020   • H/O vitamin D deficiency 10/28/2019   • Dyslipidemia 2019   • Type 2 diabetes mellitus with complication, with long-term current use of insulin ([facility] ) 2019   • Restrictive ventilatory defect 2019   • 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 [facility]) 2019   • Lung disease, restrictive 2018   • Chronic respiratory failure ([facility] ) 10/31/2018   • Coronary artery disease 2017   • Esophageal reflux 2017   • ANA LILIA (acute kidney injury) ([facility] 75 ) 03/20/2017   • Chronic low back pain 11/30/2016   • SHAVONNE and COPD overlap syndrome     • Morbid obesity     • Insulin-dependent diabetes mellitus with neuropathy 09/02/2016   • Fatigue 09/02/2016   • GERD 06/08/2016   • COPD with exacerbation ([facility] 75 ) 01/13/2016   • Psychiatric disorder    • 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/25/2015   • Erectile dysfunction of organic origin 08/25/2014   • Essential hypertension 09/04/2012   • Diabetic neuropathy ([place] [address] ) 09/04/2012   • Panic disorder without agoraphobia 09/04/2012   • Vitamin D deficiency 09/04/2012      LOS (days): 3  Geometric Mean LOS (GMLOS) (days): 3 90  Days to GMLOS:1 3     OBJECTIVE:  Risk of Unplanned Readmission Score: 51 3      Current admission status: Inpatient   Preferred Pharmacy:   [address] [place] [address]  Phone: [phone] Fax: [phone]    TIKA Rodriguez [address] ([address] [place] [address] 22)  [address] [address] [place] [address] [address])  [place] [address]  Phone: [phone] Fax: [phone]    [address] [facility] [address]  13[address] [facility] [address]  Phone: [phone] Fax: [phone]    Primary Care Provider: Sammi Gonzales MD    Primary Insurance: [facility]  Secondary Insurance: [facility] [address]ONI    DISCHARGE DETAILS:    Discharge planning discussed with[de-identified] PatientMarquita  Freedom of Choice: Yes  Comments - Freedom of Choice: SW reviewed accepting [facility] agency list with patient  Patient aware [facility] is only accepting agency at this time  Patient states he would like to move forward with reserving agency  Reservation completed in [address] Lion[address] MICHAELS aware of patient's planned discharge for today  Contact info on AVS  AVS to be faxed when available    CM contacted family/caregiver?: Yes  Were Treatment Team discharge recommendations reviewed with patient/caregiver?: Yes  Did patient/caregiver verbalize understanding of patient care needs?: Yes  Were patient/caregiver advised of the risks associated with not following Treatment Team discharge recommendations?: Yes    Contacts  Patient Contacts: Noreen Mcmanus (friend/POA)  Relationship to Patient[de-identified] Friend  Contact Method: Phone  Phone Number: [phone]  Reason/Outcome: Emergency Contact, Discharge [address] requested[de-identified] Nursing, Occupational Therapy, Physical [address] Name[de-identified] [address] Provider[de-identified] PCP  Home Health Services Needed[de-identified] Evaluate Functional Status and Safety, Gait/ADL Training, Oxygen Via Nasal Cannula, COPD Management, Strengthening/Theraputic Exercises to Improve Function  Oxygen LPM Ordered (if applicable based on home health services needed):: 3 LPM  Homebound Criteria Met[de-identified] Requires the Assistance of Another Person for Safe Ambulation or to Leave the Home, Uses an Assist Device (i e  cane, walker, etc)  Supporting Clincal Findings[de-identified] Requires Oxygen, Dyspnea with Exertion, Fatigues Easliy in [employer], Limited Endurance     Other Referral/Resources/Interventions Provided:  Interventions: Transportation    Would you like to participate in our [facility] service program?  : No - Declined    Treatment Team Recommendation[facility]  Discharge Destination Plan[de-identified] Home with [place] at Discharge : [facility]     Number/Name of Dispatcher: [facility]TS  Transported by [employer] and Unit #): TBD  ETA of Transport (Date): 11/02/22  ETA of Transport (Time):  (TBD)     IMM Given (Date):: 11/02/22  IMM Given to[de-identified] Patient  IMM reviewed with patient, patient agrees with discharge determination  Original provided  Copy placed in scan bin  Transportation pickup time pending  Patient, KERVIN Mclean provider and unit nurse aware

## 2022-11-02 NOTE — PLAN OF CARE
Problem: Potential for Falls  Goal: Patient will remain free of falls  Description: INTERVENTIONS:  - Educate patient/family on patient safety including physical limitations  - Instruct patient to call for assistance with activity   - Consult OT/PT to assist with strengthening/mobility   - Keep Call bell within reach  - Keep bed low and locked with side rails adjusted as appropriate  - Keep care items and personal belongings within reach  - Initiate and maintain comfort rounds  - Make Fall Risk Sign visible to staff  - Offer Toileting every 2 Hours, in advance of need  - Initiate/Maintain bed alarm  - Obtain necessary fall risk management equipment:   - Apply yellow socks and bracelet for high fall risk patients  - Consider moving patient to room near nurses station  Outcome: Progressing     Problem: MOBILITY - ADULT  Goal: Maintains/Returns to pre admission functional level  Description: INTERVENTIONS:  - Perform BMAT or MOVE assessment daily    - Set and communicate daily mobility goal to care team and patient/family/caregiver  - Collaborate with rehabilitation services on mobility goals if consulted  - Perform Range of Motion 3 times a day    - Actively turns self   - Dangle patient  3 times a day  - Stand patient 3 times a day  - Ambulate patient  times a day  - Out of bed to chair 3 times a day   - Out of bed for meals 3 times a day  - Out of bed for toileting  - Record patient progress and toleration of activity level   Outcome: Progressing     Problem: PAIN - ADULT  Goal: Verbalizes/displays adequate comfort level or baseline comfort level  Description: Interventions:  - Encourage patient to monitor pain and request assistance  - Assess pain using appropriate pain scale  - Administer analgesics based on type and severity of pain and evaluate response  - Implement non-pharmacological measures as appropriate and evaluate response  - Consider cultural and social influences on pain and pain management  - Notify physician/advanced practitioner if interventions unsuccessful or patient reports new pain  Outcome: Progressing

## 2022-11-03 VITALS
HEIGHT: 71 IN | SYSTOLIC BLOOD PRESSURE: 135 MMHG | WEIGHT: 258.6 LBS | HEART RATE: 103 BPM | DIASTOLIC BLOOD PRESSURE: 69 MMHG | OXYGEN SATURATION: 96 % | RESPIRATION RATE: 19 BRPM | TEMPERATURE: 97.7 F | BODY MASS INDEX: 36.2 KG/M2

## 2022-11-03 LAB
GLUCOSE SERPL-MCNC: 142 MG/DL (ref 65–140)
GLUCOSE SERPL-MCNC: 262 MG/DL (ref 65–140)
GLUCOSE SERPL-MCNC: 344 MG/DL (ref 65–140)
GLUCOSE SERPL-MCNC: 360 MG/DL (ref 65–140)
GLUCOSE SERPL-MCNC: 70 MG/DL (ref 65–140)

## 2022-11-03 RX ORDER — PREDNISONE 10 MG/1
TABLET ORAL
Qty: 26 TABLET | Refills: 0 | Status: SHIPPED | OUTPATIENT
Start: 2022-11-04

## 2022-11-03 RX ORDER — DOXYCYCLINE HYCLATE 100 MG/1
100 CAPSULE ORAL EVERY 12 HOURS SCHEDULED
Qty: 6 CAPSULE | Refills: 0 | Status: SHIPPED | OUTPATIENT
Start: 2022-11-03 | End: 2022-11-06

## 2022-11-03 RX ORDER — INSULIN LISPRO 100 [IU]/ML
6 INJECTION, SOLUTION INTRAVENOUS; SUBCUTANEOUS ONCE
Status: DISCONTINUED | OUTPATIENT
Start: 2022-11-03 | End: 2022-11-03 | Stop reason: HOSPADM

## 2022-11-03 RX ORDER — INSULIN ASPART 100 [IU]/ML
INJECTION, SOLUTION INTRAVENOUS; SUBCUTANEOUS
Qty: 30 ML | Refills: 0
Start: 2022-11-03

## 2022-11-03 RX ORDER — INSULIN GLARGINE 100 [IU]/ML
INJECTION, SOLUTION SUBCUTANEOUS
Refills: 0
Start: 2022-11-03

## 2022-11-03 RX ORDER — GUAIFENESIN 1200 MG/1
1200 TABLET, EXTENDED RELEASE ORAL 2 TIMES DAILY
Qty: 20 TABLET | Refills: 0 | Status: SHIPPED | OUTPATIENT
Start: 2022-11-03

## 2022-11-03 RX ORDER — INSULIN LISPRO 100 [IU]/ML
12 INJECTION, SOLUTION INTRAVENOUS; SUBCUTANEOUS
Status: DISCONTINUED | OUTPATIENT
Start: 2022-11-03 | End: 2022-11-03 | Stop reason: HOSPADM

## 2022-11-03 RX ORDER — TORSEMIDE 20 MG/1
20 TABLET ORAL DAILY
Qty: 30 TABLET | Refills: 0 | Status: SHIPPED | OUTPATIENT
Start: 2022-11-04

## 2022-11-03 RX ADMIN — INSULIN LISPRO 3 UNITS: 100 INJECTION, SOLUTION INTRAVENOUS; SUBCUTANEOUS at 11:58

## 2022-11-03 RX ADMIN — IPRATROPIUM BROMIDE 0.5 MG: 0.5 SOLUTION RESPIRATORY (INHALATION) at 13:29

## 2022-11-03 RX ADMIN — INSULIN LISPRO 6 UNITS: 100 INJECTION, SOLUTION INTRAVENOUS; SUBCUTANEOUS at 15:09

## 2022-11-03 RX ADMIN — INSULIN GLARGINE 60 UNITS: 100 INJECTION, SOLUTION SUBCUTANEOUS at 08:28

## 2022-11-03 RX ADMIN — GUAIFENESIN 1200 MG: 600 TABLET, EXTENDED RELEASE ORAL at 08:29

## 2022-11-03 RX ADMIN — SERTRALINE HYDROCHLORIDE 50 MG: 50 TABLET ORAL at 08:31

## 2022-11-03 RX ADMIN — ISODIUM CHLORIDE 3 ML: 0.03 SOLUTION RESPIRATORY (INHALATION) at 07:42

## 2022-11-03 RX ADMIN — INSULIN LISPRO 12 UNITS: 100 INJECTION, SOLUTION INTRAVENOUS; SUBCUTANEOUS at 11:59

## 2022-11-03 RX ADMIN — METOPROLOL SUCCINATE 200 MG: 100 TABLET, EXTENDED RELEASE ORAL at 08:34

## 2022-11-03 RX ADMIN — POTASSIUM CHLORIDE 20 MEQ: 1500 TABLET, EXTENDED RELEASE ORAL at 08:30

## 2022-11-03 RX ADMIN — Medication 1000 UNITS: at 08:32

## 2022-11-03 RX ADMIN — PANTOPRAZOLE SODIUM 40 MG: 40 TABLET, DELAYED RELEASE ORAL at 06:00

## 2022-11-03 RX ADMIN — QUETIAPINE FUMARATE 100 MG: 50 TABLET, EXTENDED RELEASE ORAL at 08:35

## 2022-11-03 RX ADMIN — LEVALBUTEROL HYDROCHLORIDE 1.25 MG: 1.25 SOLUTION, CONCENTRATE RESPIRATORY (INHALATION) at 07:42

## 2022-11-03 RX ADMIN — ASPIRIN 81 MG: 81 TABLET, COATED ORAL at 08:30

## 2022-11-03 RX ADMIN — LEVALBUTEROL HYDROCHLORIDE 1.25 MG: 1.25 SOLUTION, CONCENTRATE RESPIRATORY (INHALATION) at 13:29

## 2022-11-03 RX ADMIN — OMEGA-3 FATTY ACIDS CAP 1000 MG 1000 MG: 1000 CAP at 08:29

## 2022-11-03 RX ADMIN — BUSPIRONE HYDROCHLORIDE 10 MG: 10 TABLET ORAL at 08:34

## 2022-11-03 RX ADMIN — GABAPENTIN 600 MG: 300 CAPSULE ORAL at 08:29

## 2022-11-03 RX ADMIN — ISODIUM CHLORIDE 3 ML: 0.03 SOLUTION RESPIRATORY (INHALATION) at 13:29

## 2022-11-03 RX ADMIN — DOXYCYCLINE 100 MG: 100 CAPSULE ORAL at 08:31

## 2022-11-03 RX ADMIN — FLUTICASONE FUROATE AND VILANTEROL TRIFENATATE 1 PUFF: 100; 25 POWDER RESPIRATORY (INHALATION) at 08:35

## 2022-11-03 RX ADMIN — LOSARTAN POTASSIUM 50 MG: 50 TABLET, FILM COATED ORAL at 08:34

## 2022-11-03 RX ADMIN — DIGOXIN 250 MCG: 125 TABLET ORAL at 08:33

## 2022-11-03 RX ADMIN — OXYCODONE HYDROCHLORIDE AND ACETAMINOPHEN 1000 MG: 500 TABLET ORAL at 08:33

## 2022-11-03 RX ADMIN — IPRATROPIUM BROMIDE 0.5 MG: 0.5 SOLUTION RESPIRATORY (INHALATION) at 07:42

## 2022-11-03 RX ADMIN — TORSEMIDE 20 MG: 20 TABLET ORAL at 08:32

## 2022-11-03 RX ADMIN — GABAPENTIN 600 MG: 300 CAPSULE ORAL at 15:09

## 2022-11-03 RX ADMIN — APIXABAN 5 MG: 5 TABLET, FILM COATED ORAL at 08:34

## 2022-11-03 RX ADMIN — CYCLOBENZAPRINE HYDROCHLORIDE 10 MG: 10 TABLET, FILM COATED ORAL at 08:30

## 2022-11-03 RX ADMIN — Medication 1 TABLET: at 08:32

## 2022-11-03 RX ADMIN — PREDNISONE 40 MG: 20 TABLET ORAL at 08:31

## 2022-11-03 NOTE — DISCHARGE SUMMARY
Phan 44  Discharge- Elyssa Jean 1960, 60 y o  male MRN: [id number]  Unit/Bed#: [address] [place] [address] Encounter: [id number]  Primary Care Provider: Kodak Echevarria MD   Date and time admitted to hospital: 10/30/2022  8:26 PM    * COPD with exacerbation [facility])  Assessment & Plan  On Trelegy, albuterol inhaler and nebulizer p r n  at home  · Received IV Solu-Medrol 60 mg in ED, was decreased to 40 mg Solu-Medrol q 12 from q 8 hours  · Then received 40 mg of IV Solu-Medrol x1 yesterday and transitioned to prednisone today  Prednisone taper on discharge  · Continue doxycycline in view of wet cough( avoiding Zithromax due to interaction with digoxin) for total of 7 da  · Substituted Trelegy with [place] while here  · Follow-up with Pulmonary after discharge    Acute on chronic diastolic congestive heart failure (HCC)  Assessment & Plan  Wt Readings from Last 3 Encounters:   11/03/22 117 kg (258 lb 9 6 oz)   09/28/22 126 kg (277 lb 3 2 oz)   07/28/22 118 kg (260 lb)     Cardiology Telemedicine note in July 2022 indicated patient is to use torsemide intermittently and use daily if weight gain  · States he was taking Demadex daily at home prior to admission, but unsure dose  · 2D echo in 2/2022 showed EF 55%, dilated atriums, no significant valvular disease  · ProBNP, chest x-ray as above  · Was on Lasix 40 mg IV daily but later transitioned to 20 mg of Demadex daily  · Cardiac diet, liberalize fluid restriction  · Daily weight  · Follow-up with Cardiology after discharge            Type 2 diabetes mellitus with complication, with long-term current use of insulin [facility])  Assessment & Plan  Lab Results   Component Value Date    HGBA1C 9 2 (H) 09/29/2022       Recent Labs     11/03/22  0725 11/03/22  1138 11/03/22  1500 11/03/22  1552   POCGLU 70 262* 360* 344*     On Basaglar 60 units subQ b i d , NovoLog SSI, metformin, Victoza at home    Per patient he takes NovoLog up to 30 units at times based on his blood sugars  · Was on non DKA insulin drip due to persistently and significantly elevated blood sugars  He was transitioned off insulin drip and was placed on 65 units of Lantus along with scheduled Humalog with meals  · Advised patient to check his blood sugars at home and did inform him that he will most likely need higher doses of insulin while on steroids which can be decreased back to home dose as he is tapered off steroid  Patient states he is extremely comfortable adjusting his dose of NovoLog at home based on his blood sugar readings  · Held metformin and Victoza while here but okay to restart on discharge    Chronic respiratory failure [facility])  Assessment & Plan  Patient presented with worsening SOB and wet cough since Friday  Denied fever, chills, runny nose, sore throat  Reports using oxygen 3L chronically at home, use BiPAP 12/5 with oxygen 3L at bedtime  · Currently at baseline oxygen requirement  · COVID-19/flu/RSV negative  · Chest x-ray with mild vascular congestion, ProBNP 510  · Suspect multifactorial secondary to COPD exacerbation, acute on chronic CHF  · Received one hour neb, DuoNebs, Solu-Medrol 60mg, cefepime in ED    CLL (chronic lymphocytic leukemia) [facility])  Assessment & Plan  Follows hematology as outpatient  Currently under surveillance  · WBC 20 18 on admission --> 28, partly steroid mediated  Chronic a-fib (HCC)  Assessment & Plan  On Eliquis, digoxin, Toprol at home which he can continue on discharge  · EKG showed uncontrolled AFib, rate 114 her prior provider     · Currently rate controlled    Stage 2 chronic kidney disease  Assessment & Plan  Lab Results   Component Value Date    EGFR 78 11/02/2022    EGFR 73 11/01/2022    EGFR 54 10/31/2022    CREATININE 1 01 11/02/2022    CREATININE 1 07 11/01/2022    CREATININE 1 37 (H) 10/31/2022     Baseline creatinine appears to be around 0 7-1 0  · Creatinine mildly higher on 10/31 but then trended down    Dyslipidemia  Assessment & Plan  Continue statin    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 [facility])  Assessment & Plan  Body mass index is 36 07 kg/m²  Diet and lifestyle modification    Essential hypertension  Assessment & Plan  Continue losartan, Toprol-XL   · Blood pressures stable overall    Chronic low back pain  Assessment & Plan  Continue Neurontin, Flexeril p r n  Esophageal reflux  Assessment & Plan  Continue Prilosec on discharge    Coronary artery disease  Assessment & Plan  Denies history of cardiac stents  · Continue Toprol-XL, aspirin, Lipitor    SHAVONNE and COPD overlap syndrome   Assessment & Plan  Continue BiPAP with oxygen at bedtime as he uses at home    Psychiatric disorder  Assessment & Plan  History of bipolar, depression with anxiety  Continue Zoloft, Seroquel, BuSpar, Xanax at home  Hyponatremia-resolved as of 11/1/2022  Assessment & Plan  Sodium 124 --> 137 today  · Partly hyponatremia from hyperglycemia  · IV Lasix as above  · uric acid normal  urine sodium, urine Osm 229, serum osmol 275  · TSH low, free T4 within normal limits  Repeat lab work as outpatient   PCP notified    Medical Problems             Resolved Problems  Date Reviewed: 11/3/2022          Resolved    Hyponatremia 11/1/2022     Resolved by  Sha Fernandez DO              Discharging Physician / Practitioner: Sha Fernandez  PCP: Melquiades Granados MD  Admission Date:   Admission Orders (From admission, onward)     Ordered        10/30/22 2251  INPATIENT ADMISSION  Once                      Discharge Date: 11/03/22    Consultations During Hospital Stay:  · Pulmonary   · Cardiology    Procedures Performed:   · None    Significant Findings / Test Results:   · None    Incidental Findings:   · none    Test Results Pending at Discharge (will require follow up):   · none     Outpatient Tests Requested:  BMP    Complications:  none    Reason for Admission:  Worsening shortness of breath    Hospital Course: Zhane Mendoza is a 60 y o  male patient who originally presented to the hospital on 10/30/2022 due to Worsening shortness of breath and with cough  Used his albuterol nebulizer every 4 hours at home the day of admission  Patient was admitted and started on IV steroids for COPD exacerbation  He was seen by Pulmonary while here  He was also seen by Cardiology for CHF exacerbation and diuresed with few doses of IV Lasix  Insulin doses were adjusted as patient's blood sugars were significantly elevated on IV steroids  Symptoms improved and patient cleared by all consultants involved in his care prior to discharge  Discharge plan discussed in details with patient  Had discuss discharge plan with his friend yesterday    Please see above list of diagnoses and related plan for additional information  Condition at Discharge: stable    Discharge Day Visit / Exam:   Subjective:  Patient states breathing feels significantly better and wants to go home today as his blood sugars are also better    Vitals: Blood Pressure: 135/69 (11/03/22 0825)  Pulse: 103 (11/03/22 0825)  Temperature: 97 7 °F (36 5 °C) (11/03/22 0825)  Temp Source: Oral (11/02/22 2011)  Respirations: 19 (11/03/22 0825)  Height: 5' 11" (180 3 cm) (10/31/22 0000)  Weight - Scale: 117 kg (258 lb 9 6 oz) (11/03/22 0600)  SpO2: 96 % (11/03/22 1335)  Exam:   Physical Exam  Vitals reviewed  Constitutional:       General: He is not in acute distress  Appearance: He is not ill-appearing  HENT:      Head: Normocephalic and atraumatic  Eyes:      General:         Right eye: No discharge  Left eye: No discharge  Extraocular Movements: Extraocular movements intact  Cardiovascular:      Rate and Rhythm: Normal rate  Rhythm irregular  Pulmonary:      Effort: Pulmonary effort is normal  No respiratory distress  Breath sounds: Normal breath sounds  No wheezing or rales     Abdominal:      General: Bowel sounds are normal  There is no distension  Palpations: Abdomen is soft  Tenderness: There is no abdominal tenderness  Neurological:      Mental Status: He is alert and oriented to person, place, and time  Discussion with Family: Patient declined call to   Discharge instructions/Information to patient and family:   See after visit summary for information provided to patient and family  Provisions for Follow-Up Care:  See after visit summary for information related to follow-up care and any pertinent home health orders  Disposition:   Home with [facility]NA Services (Reminder: Complete face to face encounter)    Planned Readmission: no     Discharge Statement:  I spent > 30 minutes discharging the patient  This time was spent on the day of discharge  I had direct contact with the patient on the day of discharge  Greater than 50% of the total time was spent examining patient, answering all patient questions, arranging and discussing plan of care with patient as well as directly providing post-discharge instructions  Additional time then spent on discharge activities  Discharge Medications:  See after visit summary for reconciled discharge medications provided to patient and/or family        **Please Note: This note may have been constructed using a voice recognition system**

## 2022-11-03 NOTE — ASSESSMENT & PLAN NOTE
On Trelegy, albuterol inhaler and nebulizer p r n  at home  · Received IV Solu-Medrol 60 mg in ED, was decreased to 40 mg Solu-Medrol q 12 from q 8 hours  · Then received 40 mg of IV Solu-Medrol x1 yesterday and transitioned to prednisone today    Prednisone taper on discharge  · Continue doxycycline in view of wet cough( avoiding Zithromax due to interaction with digoxin) for total of 7 da  · Substituted Trelegy with Breo while here  · Follow-up with Pulmonary after discharge

## 2022-11-03 NOTE — PLAN OF CARE
Problem: Potential for Falls  Goal: Patient will remain free of falls  Description: INTERVENTIONS:  - Educate patient/family on patient safety including physical limitations  - Instruct patient to call for assistance with activity   - Consult OT/PT to assist with strengthening/mobility   - Keep Call bell within reach  - Keep bed low and locked with side rails adjusted as appropriate  - Keep care items and personal belongings within reach  - Initiate and maintain comfort rounds  - Make Fall Risk Sign visible to staff  - Offer Toileting every 2  Hours, in advance of need  - Initiate/Maintain bed alarm  - Obtain necessary fall risk management equipment:   - Apply yellow socks and bracelet for high fall risk patients  - Consider moving patient to room near nurses station  Outcome: Progressing     Problem: MOBILITY - ADULT  Goal: Maintains/Returns to pre admission functional level  Description: INTERVENTIONS:  - Perform BMAT or MOVE assessment daily    - Set and communicate daily mobility goal to care team and patient/family/caregiver  - Collaborate with rehabilitation services on mobility goals if consulted  - Perform Range of Motion 3 times a day  - Actively turns self     - Dangle patient 3  times a day  - Stand patient 3 times a day  - Ambulate patient 3 times a day  - Out of bed to chair 3 times a day   - Out of bed for meals 3 times a day  - Out of bed for toileting  - Record patient progress and toleration of activity level   Outcome: Progressing     Problem: PAIN - ADULT  Goal: Verbalizes/displays adequate comfort level or baseline comfort level  Description: Interventions:  - Encourage patient to monitor pain and request assistance  - Assess pain using appropriate pain scale  - Administer analgesics based on type and severity of pain and evaluate response  - Implement non-pharmacological measures as appropriate and evaluate response  - Consider cultural and social influences on pain and pain management  - Notify physician/advanced practitioner if interventions unsuccessful or patient reports new pain  Outcome: Progressing     Problem: SAFETY ADULT  Goal: Maintains/Returns to pre admission functional level  Description: INTERVENTIONS:  - Perform BMAT or MOVE assessment daily    - Set and communicate daily mobility goal to care team and patient/family/caregiver  - Collaborate with rehabilitation services on mobility goals if consulted  - Perform Range of Motion 3 times a day  - Actively turns self  - Dangle patient 3 times a day  - Stand patient 3 times a day  - Ambulate patient 3 times a day  - Out of bed to chair 3 times a day   - Out of bed for meals 3 times a day  - Out of bed for toileting  - Record patient progress and toleration of activity level   Outcome: Progressing     Problem: DISCHARGE PLANNING  Goal: Discharge to home or other facility with appropriate resources  Description: INTERVENTIONS:  - Identify barriers to discharge w/patient and caregiver  - Arrange for needed discharge resources and transportation as appropriate  - Identify discharge learning needs (meds, wound care, etc )  - Arrange for interpretive services to assist at discharge as needed  - Refer to Case Management Department for coordinating discharge planning if the patient needs post-hospital services based on physician/advanced practitioner order or complex needs related to functional status, cognitive ability, or social support system  Outcome: Progressing     Problem: Knowledge Deficit  Goal: Patient/family/caregiver demonstrates understanding of disease process, treatment plan, medications, and discharge instructions  Description: Complete learning assessment and assess knowledge base    Interventions:  - Provide teaching at level of understanding  - Provide teaching via preferred learning methods  Outcome: Progressing     Problem: RESPIRATORY - ADULT  Goal: Achieves optimal ventilation and oxygenation  Description: INTERVENTIONS:  - Assess for changes in respiratory status  - Assess for changes in mentation and behavior  - Position to facilitate oxygenation and minimize respiratory effort  - Oxygen administered by appropriate delivery if ordered  - Initiate smoking cessation education as indicated  - Encourage broncho-pulmonary hygiene including cough, deep breathe, Incentive Spirometry  - Assess the need for suctioning and aspirate as needed  - Assess and instruct to report SOB or any respiratory difficulty  - Respiratory Therapy support as indicated  Outcome: Progressing     Problem: METABOLIC, FLUID AND ELECTROLYTES - ADULT  Goal: Electrolytes maintained within normal limits  Description: INTERVENTIONS:  - Monitor labs and assess patient for signs and symptoms of electrolyte imbalances  - Administer electrolyte replacement as ordered  - Monitor response to electrolyte replacements, including repeat lab results as appropriate  - Instruct patient on fluid and nutrition as appropriate  Outcome: Progressing     Problem: METABOLIC, FLUID AND ELECTROLYTES - ADULT  Goal: Fluid balance maintained  Description: INTERVENTIONS:  - Monitor labs   - Monitor I/O and WT  - Instruct patient on fluid and nutrition as appropriate  - Assess for signs & symptoms of volume excess or deficit  Outcome: Progressing     Problem: METABOLIC, FLUID AND ELECTROLYTES - ADULT  Goal: Glucose maintained within target range  Description: INTERVENTIONS:  - Monitor Blood Glucose as ordered  - Assess for signs and symptoms of hyperglycemia and hypoglycemia  - Administer ordered medications to maintain glucose within target range  - Assess nutritional intake and initiate nutrition service referral as needed  Outcome: Progressing     Problem: MUSCULOSKELETAL - ADULT  Goal: Maintain or return mobility to safest level of function  Description: INTERVENTIONS:  - Assess patient's ability to carry out ADLs; assess patient's baseline for ADL function and identify physical deficits which impact ability to perform ADLs (bathing, care of mouth/teeth, toileting, grooming, dressing, etc )  - Assess/evaluate cause of self-care deficits   - Assess range of motion  - Assess patient's mobility  - Assess patient's need for assistive devices and provide as appropriate  - Encourage maximum independence but intervene and supervise when necessary  - Involve family in performance of ADLs  - Assess for home care needs following discharge   - Consider OT consult to assist with ADL evaluation and planning for discharge  - Provide patient education as appropriate  Outcome: Progressing

## 2022-11-03 NOTE — PROGRESS NOTES
{tunde ip progress note specialty templates:[id number]} adenopathy  Skin:     General: Skin is warm and dry  Findings: No rash  Neurological:      Mental Status: He is alert and oriented to person, place, and time  Psychiatric:         Behavior: Behavior normal          Thought Content: Thought content normal           Labs: I have personally reviewed pertinent lab results  , ABG:   Lab Results   Component Value Date    PHART 7 413 03/24/2022    DFZ2YRD 38 3 03/24/2022    PO2ART 132 0 (H) 03/24/2022    DQF2GIT 23 9 03/24/2022    BEART -0 4 03/24/2022    SOURCE Brachial, Right 03/24/2022   , BNP: No results found for: BNP, CBC:   Lab Results   Component Value Date    WBC 28 56 (H) 11/01/2022    HGB 13 3 11/01/2022    HCT 40 1 11/01/2022    MCV 93 11/01/2022     11/01/2022    ADJUSTEDWBC 8 60 09/14/2016    MCH 30 9 11/01/2022    MCHC 33 2 11/01/2022    RDW 13 0 11/01/2022    MPV 9 7 11/01/2022    NRBC 0 09/29/2022   , CMP:   Lab Results   Component Value Date    K 4 4 11/10/2022    CL 93 (L) 11/10/2022    CO2 26 11/10/2022    BUN 21 11/10/2022    CREATININE 1 06 11/10/2022    CALCIUM 9 3 11/10/2022    AST  10/30/2022      Comment:      No result  If an AST is required for patient care, it must be ordered separately  Specimen collection should occur prior to Sulfasalazine administration due to the potential for falsely depressed results  ALT 20 10/30/2022    ALKPHOS 53 10/30/2022    EGFR 74 11/10/2022   , PT/INR:   Lab Results   Component Value Date    INR 1 43 (H) 10/30/2022   , Troponin: No results found for: TROPONIN    Imaging and other studies: I have personally reviewed pertinent reports     and I have personally reviewed pertinent films in PACS

## 2022-11-03 NOTE — DISCHARGE INSTRUCTIONS
Follow-up with your cardiologist after discharge    Continue with NovoLog sliding scale insulin as you normally do at home  Be aware that your sugars are going to be running high while on prednisone  You have been getting 15 units of Novolog along with sliding scale here    Your insulin doses may need to be decreased as you are tapered off prednisone    Increase your Basalgar to 65 units twice a day    As the prednisone is tapered down or if your sugars start to run low you can decrease it back down to 60 units twice a day

## 2022-11-03 NOTE — PROGRESS NOTES
Progress Note - Pulmonary   Tylor Councilman 60 y o  male MRN: [id number]  Unit/Bed#: [address] [place] [address] Encounter: [id number]    Assessment:  Acute exacerbation of COPD improving  Chronic hypoxemic respiratory failure  Oxygen requirements stable at 2 liters/minute  Hyperglycemia related to steroid therapy and diabetes mellitus type 2  Obesity alveolar hypoventilation  CLL stage 0  Chronic atrial fibrillation    Plan:  Complete 7 day course of doxycycline for acute bronchitis  Agree with decreasing methylprednisone to 40 mg IV daily for today and start on p o  prednisone tomorrow  Blood sugars still remain greater than 500 then can lower dose even further to 20 or 30 mg daily with taper  Patient uses auto BiPAP at home  Here he will continue with BiPAP 12/6 with oxygen at bedtime with 3 L of oxygen      Subjective:   Breathing had cough have improved but blood sugars have been elevated    Objective:     Vitals: Blood pressure 142/82, pulse 85, temperature 97 8 °F (36 6 °C), resp  rate 20, height 5' 11" (1 803 m), weight 117 kg (257 lb 15 oz), SpO2 95 %  ,Body mass index is 35 98 kg/m²  Intake/Output Summary (Last 24 hours) at 11/2/2022 2010  Last data filed at 11/2/2022 0518  Gross per 24 hour   Intake 500 ml   Output 1275 ml   Net -775 ml       Physical Exam: Physical Exam  Vitals reviewed  Constitutional:       General: He is not in acute distress  Appearance: Normal appearance  He is well-developed  Comments: On 2 liters/minutes nasal cannula oxygen O2 saturation is 92%   HENT:      Head: Normocephalic  Right Ear: External ear normal       Left Ear: External ear normal       Nose: Nose normal       Mouth/Throat:      Mouth: Mucous membranes are moist       Pharynx: Oropharynx is clear  No oropharyngeal exudate  Eyes:      Conjunctiva/sclera: Conjunctivae normal       Pupils: Pupils are equal, round, and reactive to light  Neck:      Vascular: No JVD  Trachea: No tracheal deviation  Cardiovascular:      Rate and Rhythm: Normal rate and regular rhythm  Heart sounds: Normal heart sounds  Pulmonary:      Effort: Pulmonary effort is normal       Comments: Lung sounds are clear  No wheezes, crackles or rhonchi  Abdominal:      General: There is no distension  Palpations: Abdomen is soft  Tenderness: There is no abdominal tenderness  There is no guarding  Musculoskeletal:      Cervical back: Neck supple  Comments: No   Lymphadenopathy:      Cervical: No cervical adenopathy  Skin:     General: Skin is warm and dry  Findings: No rash  Neurological:      General: No focal deficit present  Mental Status: He is alert and oriented to person, place, and time  Psychiatric:         Behavior: Behavior normal          Thought Content: Thought content normal           Labs: I have personally reviewed pertinent lab results  , ABG:   Lab Results   Component Value Date    PHART 7 413 03/24/2022    [id number] 38 3 03/24/2022    PO2ART 132 0 (H) 03/24/2022    ABU7RGA 23 9 03/24/2022    BEART -0 4 03/24/2022    SOURCE Brachial, Right 03/24/2022   , BNP: No results found for: BNP, CBC:   Lab Results   Component Value Date    WBC 28 56 (H) 11/01/2022    HGB 13 3 11/01/2022    HCT 40 1 11/01/2022    MCV 93 11/01/2022     11/01/2022    ADJUSTEDWBC 8 60 09/14/2016    MCH 30 9 11/01/2022    MCHC 33 2 11/01/2022    RDW 13 0 11/01/2022    MPV 9 7 11/01/2022    NRBC 0 09/29/2022   , CMP:   Lab Results   Component Value Date    K 4 5 11/02/2022    CL 98 11/02/2022    CO2 25 11/02/2022    BUN 29 (H) 11/02/2022    CREATININE 1 01 11/02/2022    CALCIUM 9 5 11/02/2022    AST  10/30/2022      Comment:      No result  If an AST is required for patient care, it must be ordered separately  Specimen collection should occur prior to Sulfasalazine administration due to the potential for falsely depressed results       ALT 20 10/30/2022    ALKPHOS 53 10/30/2022    EGFR 78 11/02/2022   , PT/INR: Lab Results   Component Value Date    INR 1 43 (H) 10/30/2022   , Troponin: No results found for: TROPONIN    Imaging and other studies: I have personally reviewed pertinent reports     and I have personally reviewed pertinent films in PACS

## 2022-11-03 NOTE — CASE MANAGEMENT
Case Management Discharge Planning Note    Patient name Freddy Hoffman  Location [address] [facility] 420/4 [place] 5-* MRN [id number]  : 1960 Date 11/3/2022       Current Admission Date: 10/30/2022  Current Admission Diagnosis:COPD with exacerbation [facility])   Patient Active Problem List    Diagnosis Date Noted   • CLL (chronic lymphocytic leukemia) ([facility] ) 2022   • Chronic pain syndrome 2022   • Foraminal stenosis of lumbar region 2022   • Lumbar post-laminectomy syndrome 2022   • Lumbar radiculopathy 2022   • Shortness of breath 2022   • SIRS (systemic inflammatory response syndrome) ([place] [address] ) 2022   • Dysuria 2021   • Peripheral neuropathy 2021   • Acute exacerbation of chronic low back pain 2021   • BMI 40 0-44 9, adult ([place] [address] ) 2021   • Muscle weakness (generalized) 2021   • Platelets decreased ([place] [address] ) 2021   • Acute on chronic diastolic congestive heart failure ([facility] ) 2020   • Hyperlipidemia 10/29/2020   • Nutritional anemia, unspecified  10/29/2020   • Chest pain 10/11/2020   • Simple chronic bronchitis ([facility] ) 10/11/2020   • Hyperglycemia 2020   • Transition of care performed with sharing of clinical summary 2020   • Obstructive sleep apnea 2020   • Obesity (BMI 30-39 9) 2020   • Stage 2 chronic kidney disease 2020   • COPD (chronic obstructive pulmonary disease) ([facility] ) 2020   • Chronic a-fib ([place] [address] ) 2020   • H/O vitamin D deficiency 10/28/2019   • Dyslipidemia 2019   • Type 2 diabetes mellitus with complication, with long-term current use of insulin ([facility] ) 2019   • Restrictive ventilatory defect 2019   • 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 [facility]) 2019   • Lung disease, restrictive 2018   • Chronic respiratory failure ([facility] ) 10/31/2018   • Coronary artery disease 2017   • Esophageal reflux 2017   • ANA LILIA (acute kidney injury) ([facility] ) 03/20/2017   • Chronic low back pain 11/30/2016   • SHAVONNE and COPD overlap syndrome     • Morbid obesity     • Insulin-dependent diabetes mellitus with neuropathy 09/02/2016   • Fatigue 09/02/2016   • GERD 06/08/2016   • COPD with exacerbation ([facility] ) 01/13/2016   • Psychiatric disorder    • 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/25/2015   • Erectile dysfunction of organic origin 08/25/2014   • Essential hypertension 09/04/2012   • Diabetic neuropathy ([place] [address] ) 09/04/2012   • Panic disorder without agoraphobia 09/04/2012   • Vitamin D deficiency 09/04/2012      LOS (days): 4  Geometric Mean LOS (GMLOS) (days): 3 90  Days to GMLOS:0 3     OBJECTIVE:  Risk of Unplanned Readmission Score: 49 06      Current admission status: Inpatient   Preferred Pharmacy:   [facility], [address] [address]  Phone: [phone] Fax: [phone]    TIKA Rodriguez [address] [facility] [address] [place] [address] (9920 [facility] 22)  [address] [address] [place] [address] 70 (9920 [facility] [address] [place] [address])  [place] [address]  Phone: [phone] Fax: [phone]    [address] [place] [address] [address]  1306 [facility] [address]  Phone: [phone] Fax: [phone]    Primary Care Provider: Jameel Jain MD    Primary Insurance: [facility]  Secondary Insurance: [address] [place] [address]    DISCHARGE DETAILS:    Discharge planning discussed with[de-identified] Patient    Other Referral/Resources/Interventions Provided:  Interventions: Transportation    Would you like to participate in our [address] service program?  : No - Declined    Treatment Team Recommendation: Home with [address]  Discharge Destination Plan[de-identified] Home with [place] at Discharge : [facility]     Number/Name of Dispatcher: SLETS  Transported by [employer] and Unit #): TBD  ETA of Transport (Date): 11/03/22  ETA of Transport (Time):  (TBD)    Attending advised  patient is medically stable for discharge today  [facility] updated in [facility] of planned discharge  AVS will be faxed when available  WCV w/ O2 ordered in [employer]  Pickup time pending

## 2022-11-04 DIAGNOSIS — Z71.89 COMPLEX CARE COORDINATION: Primary | ICD-10-CM

## 2022-11-04 NOTE — ASSESSMENT & PLAN NOTE
On Eliquis, digoxin, Toprol at home which he can continue on discharge  · EKG showed uncontrolled AFib, rate 114 her prior provider     · Currently rate controlled

## 2022-11-04 NOTE — ASSESSMENT & PLAN NOTE
Lab Results   Component Value Date    HGBA1C 9 2 (H) 09/29/2022       Recent Labs     11/03/22  0725 11/03/22  1138 11/03/22  1500 11/03/22  1552   POCGLU 70 262* 360* 344*     On Basaglar 60 units subQ b i d , NovoLog SSI, metformin, Victoza at home  Per patient he takes NovoLog up to 30 units at times based on his blood sugars  · Was on non DKA insulin drip due to persistently and significantly elevated blood sugars  He was transitioned off insulin drip and was placed on 65 units of Lantus along with scheduled Humalog with meals  · Advised patient to check his blood sugars at home and did inform him that he will most likely need higher doses of insulin while on steroids which can be decreased back to home dose as he is tapered off steroid    Patient states he is extremely comfortable adjusting his dose of NovoLog at home based on his blood sugar readings  · Held metformin and Victoza while here but okay to restart on discharge

## 2022-11-04 NOTE — ASSESSMENT & PLAN NOTE
Patient presented with worsening SOB and wet cough since Friday  Denied fever, chills, runny nose, sore throat  Reports using oxygen 3L chronically at home, use BiPAP 12/5 with oxygen 3L at bedtime     · Currently at baseline oxygen requirement  · COVID-19/flu/RSV negative  · Chest x-ray with mild vascular congestion, ProBNP 510  · Suspect multifactorial secondary to COPD exacerbation, acute on chronic CHF  · Received one hour neb, DuoNebs, Solu-Medrol 60mg, cefepime in ED

## 2022-11-04 NOTE — ASSESSMENT & PLAN NOTE
Wt Readings from Last 3 Encounters:   11/03/22 117 kg (258 lb 9 6 oz)   09/28/22 126 kg (277 lb 3 2 oz)   07/28/22 118 kg (260 lb)     Cardiology Telemedicine note in July 2022 indicated patient is to use torsemide intermittently and use daily if weight gain  · States he was taking Demadex daily at home prior to admission, but unsure dose  · 2D echo in 2/2022 showed EF 55%, dilated atriums, no significant valvular disease    · ProBNP, chest x-ray as above  · Was on Lasix 40 mg IV daily but later transitioned to 20 mg of Demadex daily  · Cardiac diet, liberalize fluid restriction  · Daily weight  · Follow-up with Cardiology after discharge

## 2022-11-04 NOTE — UTILIZATION REVIEW
NOTIFICATION OF ADMISSION DISCHARGE   This is a Notification of Discharge from [address]  Please be advised that this patient has been discharge from our facility  Below you will find the admission and discharge date and time including the patient’s disposition  UTILIZATION REVIEW CONTACT:  Lawyer Bautista  Utilization   Network Utilization Review Department  Phone: [phone] x carefully listen to the prompts  All voicemails are confidential   Email: [email or web address]     ADMISSION INFORMATION  PRESENTATION DATE: 10/30/2022  8:26 PM  OBERVATION ADMISSION DATE:   INPATIENT ADMISSION DATE: 10/30/22 10:51 PM   DISCHARGE DATE: 11/3/2022  4:52 PM  DISPOSITION[facility] [address] INFORMATION:  Send all requests for admission clinical reviews, approved or denied determinations and any other requests to dedicated fax number below belonging to the campus where the patient is receiving treatment   List of dedicated fax numbers:  [address] DENIALS (Administrative/Medical Necessity) [phone]   [address] (Maternity/NICU/Pediatrics) [phone]   [facility] [phone]   [place] [address] [phone]   [facility]note 3[facility] Medical [place]  [phone]   [facility] [phone]   [address] [facility] [address] [phone]   [address] [place] [address] [phone]   [facility]  [phone]   [facility] [phone]   [facility] [address] [facility] [phone]

## 2022-11-04 NOTE — ASSESSMENT & PLAN NOTE
Sodium 124 --> 137 today  · Partly hyponatremia from hyperglycemia  · IV Lasix as above  · uric acid normal  urine sodium, urine Osm 229, serum osmol 275  · TSH low, free T4 within normal limits  Repeat lab work as outpatient   PCP notified

## 2022-11-04 NOTE — ASSESSMENT & PLAN NOTE
Lab Results   Component Value Date    EGFR 78 11/02/2022    EGFR 73 11/01/2022    EGFR 54 10/31/2022    CREATININE 1 01 11/02/2022    CREATININE 1 07 11/01/2022    CREATININE 1 37 (H) 10/31/2022     Baseline creatinine appears to be around 0 7-1 0  · Creatinine mildly higher on 10/31 but then trended down

## 2022-11-05 LAB
BACTERIA BLD CULT: NORMAL
BACTERIA BLD CULT: NORMAL

## 2022-11-07 ENCOUNTER — PATIENT OUTREACH (OUTPATIENT)
Dept: FAMILY MEDICINE CLINIC | Facility: CLINIC | Age: 62
End: 2022-11-07

## 2022-11-07 ENCOUNTER — TRANSITIONAL CARE MANAGEMENT (OUTPATIENT)
Dept: FAMILY MEDICINE CLINIC | Facility: CLINIC | Age: 62
End: 2022-11-07

## 2022-11-07 NOTE — PROGRESS NOTES
HRR referral for CCM    Chart was reviewed and this [facility] RN called patient and introduced self and explained the role of the [facility] RN and complex case management  Patient states he is feeling well  He states he has all his medications and is taking them as directed  He declines any follow up at this time  H was provided with my contact information and encouraged to call with any questions or concerns  No CCM episode opened

## 2022-11-10 ENCOUNTER — APPOINTMENT (OUTPATIENT)
Dept: LAB | Facility: CLINIC | Age: 62
End: 2022-11-10

## 2022-11-10 ENCOUNTER — TELEPHONE (OUTPATIENT)
Dept: FAMILY MEDICINE CLINIC | Facility: CLINIC | Age: 62
End: 2022-11-10

## 2022-11-10 DIAGNOSIS — E11.65 UNCONTROLLED TYPE 2 DIABETES MELLITUS WITH HYPERGLYCEMIA (HCC): Chronic | ICD-10-CM

## 2022-11-10 DIAGNOSIS — N18.2 STAGE 2 CHRONIC KIDNEY DISEASE: ICD-10-CM

## 2022-11-10 DIAGNOSIS — I50.43 ACUTE ON CHRONIC COMBINED SYSTOLIC AND DIASTOLIC CONGESTIVE HEART FAILURE (HCC): ICD-10-CM

## 2022-11-10 LAB
ANION GAP SERPL CALCULATED.3IONS-SCNC: 9 MMOL/L (ref 4–13)
BUN SERPL-MCNC: 21 MG/DL (ref 5–25)
CALCIUM SERPL-MCNC: 9.3 MG/DL (ref 8.3–10.1)
CHLORIDE SERPL-SCNC: 93 MMOL/L (ref 96–108)
CO2 SERPL-SCNC: 26 MMOL/L (ref 21–32)
CREAT SERPL-MCNC: 1.06 MG/DL (ref 0.6–1.3)
GFR SERPL CREATININE-BSD FRML MDRD: 74 ML/MIN/1.73SQ M
GLUCOSE SERPL-MCNC: 497 MG/DL (ref 65–140)
POTASSIUM SERPL-SCNC: 4.4 MMOL/L (ref 3.5–5.3)
SODIUM SERPL-SCNC: 128 MMOL/L (ref 135–147)

## 2022-11-10 NOTE — TELEPHONE ENCOUNTER
Pt w hypoglymic episode---needs to decrease amount of insulin he is using--please confirm meds as he has not been seen since his most recent hospitalization--chart states Basaglar 65u twice daily--if this is correct then instruct him to decreased to 62u twice daily and cont t monitor and send us readings

## 2022-11-10 NOTE — TELEPHONE ENCOUNTER
Dr Lorine Skiff    Patient says his blood sugar was under 50 yesterday around 6pm after dinner  He has not had any sweets  He was also sweating  He had spoonful of peanut butter, no bread and he started to feel better  Patient says he is unable to come in for appointment until late next week as he needs to arrange transportation  He is scheduled for AWV 11/21  Please advise  I advised that Dr Lorine Skiff is out of the office today

## 2022-11-11 NOTE — TELEPHONE ENCOUNTER
I called and spoke with pt and he said he is taking the 65 units bid I advised him to decrease to 62 units bid   He will send number on Monday

## 2022-11-15 ENCOUNTER — TELEPHONE (OUTPATIENT)
Dept: FAMILY MEDICINE CLINIC | Facility: CLINIC | Age: 62
End: 2022-11-15

## 2022-11-18 ENCOUNTER — TELEPHONE (OUTPATIENT)
Dept: FAMILY MEDICINE CLINIC | Facility: CLINIC | Age: 62
End: 2022-11-18

## 2022-11-18 ENCOUNTER — TELEPHONE (OUTPATIENT)
Dept: HEMATOLOGY ONCOLOGY | Facility: MEDICAL CENTER | Age: 62
End: 2022-11-18

## 2022-11-18 PROBLEM — C95.90 LEUKEMIA (HCC): Status: ACTIVE | Noted: 2022-05-01

## 2022-11-18 NOTE — TELEPHONE ENCOUNTER
11/18/22    Spoke with patient's Delroy Michel regarding this no-show appt  Anne Marie Javed will reach out to pt and help schd a new f/u appt for pt  I also explained to her pt will also need to get labs done prior to his new appt  Lab orders mailed out today  Address confirmed  Anne Marie Javed verbalized great understanding and appreciation  She will call us back once pt has labs done for a new f/u appt  Hope line number provided

## 2022-11-30 ENCOUNTER — TELEPHONE (OUTPATIENT)
Dept: CARDIOLOGY CLINIC | Facility: CLINIC | Age: 62
End: 2022-11-30

## 2022-11-30 DIAGNOSIS — I25.10 CORONARY ARTERY DISEASE INVOLVING NATIVE HEART WITHOUT ANGINA PECTORIS, UNSPECIFIED VESSEL OR LESION TYPE: ICD-10-CM

## 2022-11-30 DIAGNOSIS — I48.91 ATRIAL FIBRILLATION WITH RVR (HCC): ICD-10-CM

## 2022-11-30 RX ORDER — DIGOXIN 250 MCG
250 TABLET ORAL DAILY
Qty: 90 TABLET | Refills: 3 | Status: SHIPPED | OUTPATIENT
Start: 2022-11-30

## 2022-11-30 RX ORDER — METOPROLOL SUCCINATE 200 MG/1
200 TABLET, EXTENDED RELEASE ORAL DAILY
Qty: 90 TABLET | Refills: 1 | Status: SHIPPED | OUTPATIENT
Start: 2022-11-30

## 2022-11-30 NOTE — TELEPHONE ENCOUNTER
Patient needs refills to [address] [place] [address] for Digoxin,Eliquis  And Metoprolol for 90 days   Patient of Dr Hutton [place]

## 2022-11-30 NOTE — TELEPHONE ENCOUNTER
Virtual appt made for Feb 7 at 4:40pm left message for patient to call back to see if this appt is good   Also sent refill to Clinical staff for refills to [facility]

## 2022-12-06 DIAGNOSIS — G89.4 CHRONIC PAIN SYNDROME: ICD-10-CM

## 2022-12-06 DIAGNOSIS — G89.29 CHRONIC LEFT-SIDED LOW BACK PAIN WITH LEFT-SIDED SCIATICA: ICD-10-CM

## 2022-12-06 DIAGNOSIS — M54.16 LUMBAR RADICULOPATHY: ICD-10-CM

## 2022-12-06 DIAGNOSIS — M96.1 LUMBAR POST-LAMINECTOMY SYNDROME: ICD-10-CM

## 2022-12-06 DIAGNOSIS — M54.42 CHRONIC LEFT-SIDED LOW BACK PAIN WITH LEFT-SIDED SCIATICA: ICD-10-CM

## 2022-12-06 DIAGNOSIS — M48.061 FORAMINAL STENOSIS OF LUMBAR REGION: ICD-10-CM

## 2022-12-06 RX ORDER — CYCLOBENZAPRINE HCL 10 MG
10 TABLET ORAL 3 TIMES DAILY PRN
Qty: 90 TABLET | Refills: 0 | Status: CANCELLED | OUTPATIENT
Start: 2022-12-06

## 2022-12-06 RX ORDER — GABAPENTIN 600 MG/1
600 TABLET ORAL 3 TIMES DAILY
Qty: 90 TABLET | Refills: 0 | Status: CANCELLED | OUTPATIENT
Start: 2022-12-06

## 2022-12-12 ENCOUNTER — APPOINTMENT (EMERGENCY)
Dept: RADIOLOGY | Facility: HOSPITAL | Age: 62
End: 2022-12-12

## 2022-12-12 ENCOUNTER — HOSPITAL ENCOUNTER (EMERGENCY)
Facility: HOSPITAL | Age: 62
Discharge: HOME | End: 2022-12-12
Attending: EMERGENCY MEDICINE

## 2022-12-12 VITALS
DIASTOLIC BLOOD PRESSURE: 95 MMHG | HEART RATE: 92 BPM | TEMPERATURE: 98.3 F | SYSTOLIC BLOOD PRESSURE: 155 MMHG | RESPIRATION RATE: 18 BRPM | OXYGEN SATURATION: 98 % | BODY MASS INDEX: 36.12 KG/M2 | WEIGHT: 258 LBS | HEIGHT: 71 IN

## 2022-12-12 DIAGNOSIS — S60.229A CONTUSION OF HAND: Primary | ICD-10-CM

## 2022-12-12 DIAGNOSIS — T14.8XXA ABRASION: ICD-10-CM

## 2022-12-12 DIAGNOSIS — S63.619A FINGER SPRAIN: ICD-10-CM

## 2022-12-12 RX ORDER — GINSENG 100 MG
1 CAPSULE ORAL 2 TIMES DAILY
Qty: 28 G | Refills: 0 | Status: SHIPPED | OUTPATIENT
Start: 2022-12-12 | End: 2022-12-19

## 2022-12-12 RX ORDER — GINSENG 100 MG
1 CAPSULE ORAL ONCE
Status: COMPLETED | OUTPATIENT
Start: 2022-12-12 | End: 2022-12-12

## 2022-12-12 RX ORDER — ACETAMINOPHEN 325 MG/1
975 TABLET ORAL ONCE
Status: COMPLETED | OUTPATIENT
Start: 2022-12-12 | End: 2022-12-12

## 2022-12-12 RX ORDER — TRAMADOL HYDROCHLORIDE 50 MG/1
50 TABLET ORAL 2 TIMES DAILY PRN
Qty: 6 TABLET | Refills: 0 | Status: SHIPPED | OUTPATIENT
Start: 2022-12-12 | End: 2022-12-15

## 2022-12-12 RX ADMIN — BACITRACIN 1 SMALL APPLICATION: 500 OINTMENT TOPICAL at 10:50

## 2022-12-12 RX ADMIN — ACETAMINOPHEN 975 MG: 325 TABLET, FILM COATED ORAL at 10:50
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
## 2022-12-12 NOTE — DISCHARGE INSTRUCTIONS
Please apply bacitracin or antibiotic ointment twice a day of the wound and keep it covered  Please keep the hand in the splint and follow-up with orthopedics as recommended  Ice the finger  Take tylenol for pain control

## 2022-12-13 ENCOUNTER — TELEMEDICINE (OUTPATIENT)
Dept: FAMILY MEDICINE CLINIC | Facility: CLINIC | Age: 62
End: 2022-12-13

## 2022-12-13 DIAGNOSIS — C91.10 CLL (CHRONIC LYMPHOCYTIC LEUKEMIA) (HCC): Primary | ICD-10-CM

## 2022-12-13 DIAGNOSIS — J44.9 OSA AND COPD OVERLAP SYNDROME (HCC): Chronic | ICD-10-CM

## 2022-12-13 DIAGNOSIS — I10 BENIGN ESSENTIAL HYPERTENSION: ICD-10-CM

## 2022-12-13 DIAGNOSIS — G47.33 OSA AND COPD OVERLAP SYNDROME (HCC): Chronic | ICD-10-CM

## 2022-12-13 DIAGNOSIS — E66.9 OBESITY (BMI 30-39.9): ICD-10-CM

## 2022-12-13 DIAGNOSIS — Z71.89 DNR (DO NOT RESUSCITATE) DISCUSSION: ICD-10-CM

## 2022-12-13 DIAGNOSIS — K21.00 CHRONIC REFLUX ESOPHAGITIS: ICD-10-CM

## 2022-12-13 DIAGNOSIS — G89.4 CHRONIC PAIN SYNDROME: ICD-10-CM

## 2022-12-13 RX ORDER — LAMOTRIGINE 25 MG/1
TABLET ORAL
COMMUNITY
Start: 2022-10-28

## 2022-12-23 ENCOUNTER — TELEPHONE (OUTPATIENT)
Dept: ADMINISTRATIVE | Facility: OTHER | Age: 62
End: 2022-12-23

## 2022-12-23 NOTE — LETTER
Diabetic Eye Exam Form    Date Requested: 22  Patient: Natalie Leiva  Patient : 1960   Referring Provider: Flor Palma MD      DIABETIC Eye Exam Date _______________________________      Type of Exam MUST be documented for Diabetic Eye Exams  Please CHECK ONE  Retinal Exam       Dilated Retinal Exam       OCT       Optomap-Iris Exam      Fundus Photography       Left Eye - Please check Retinopathy or No Retinopathy        Exam did show retinopathy    Exam did not show retinopathy       Right Eye - Please check Retinopathy or No Retinopathy       Exam did show retinopathy    Exam did not show retinopathy       Comments __________________________________________________________    Practice Providing Exam ______________________________________________    Exam Performed By (print name) _______________________________________      Provider Signature ___________________________________________________      These reports are needed for  compliance  Please fax this completed form and a copy of the Diabetic Eye Exam report to our office located at [place] [address] as soon as possible via [phone] holly Balderas: Phone [phone]  We thank you for your assistance in treating our mutual patient

## 2022-12-23 NOTE — LETTER
Diabetic Eye Exam Form    Date Requested: 23  Patient: Zaira Magaña  Patient : 1960   Referring Provider: Wing David MD      DIABETIC Eye Exam Date _______________________________      Type of Exam MUST be documented for Diabetic Eye Exams  Please CHECK ONE  Retinal Exam       Dilated Retinal Exam       OCT       Optomap-Iris Exam      Fundus Photography       Left Eye - Please check Retinopathy or No Retinopathy        Exam did show retinopathy    Exam did not show retinopathy       Right Eye - Please check Retinopathy or No Retinopathy       Exam did show retinopathy    Exam did not show retinopathy       Comments __________________________________________________________    Practice Providing Exam ______________________________________________    Exam Performed By (print name) _______________________________________      Provider Signature ___________________________________________________      These reports are needed for  compliance  Please fax this completed form and a copy of the Diabetic Eye Exam report to our office located at [place] [address] as soon as possible via [phone] holly George Vinay: Phone [phone]  We thank you for your assistance in treating our mutual patient

## 2022-12-23 NOTE — TELEPHONE ENCOUNTER
----- Message from Benson Maurer sent at 12/21/2022  3:40 PM EST -----  Regarding: dm eye  12/21/22 3:40 PM    Hello, our patient attached above has had dm eye exam  completed/performed   Please assist in updating the patient chart by Augie Kirkpatrick in [place]  The date of service is 9/22    Thank you,  Marlys Herron  [facility]

## 2022-12-28 NOTE — TELEPHONE ENCOUNTER
Upon review of the In Basket request and the patient's chart, initial outreach has been made via fax to facility  Please see Contacts section for details       Thank you  Micky Bowen

## 2023-01-02 PROBLEM — Z71.89 DNR (DO NOT RESUSCITATE) DISCUSSION: Status: ACTIVE | Noted: 2021-02-28
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
## 2023-01-06 NOTE — TELEPHONE ENCOUNTER
As a follow-up, a second attempt has been made for outreach via fax to facility  Please see Contacts section for details      Thank you  Lor Mcnamara

## 2023-01-09 ENCOUNTER — RA CDI HCC (OUTPATIENT)
Dept: OTHER | Facility: HOSPITAL | Age: 63
End: 2023-01-09

## 2023-01-09 NOTE — PROGRESS NOTES
E11 40  I11 0  [facility]  coding opportunities          Chart Reviewed number of suggestions sent to Provider: 2     Patients Insurance     Medicare Insurance: [employer]

## 2023-01-12 DIAGNOSIS — D53.9 NUTRITIONAL ANEMIA, UNSPECIFIED: ICD-10-CM

## 2023-01-12 DIAGNOSIS — Z12.5 PROSTATE CANCER SCREENING: ICD-10-CM

## 2023-01-12 DIAGNOSIS — E78.5 DYSLIPIDEMIA: ICD-10-CM

## 2023-01-12 DIAGNOSIS — N40.0 BPH WITHOUT OBSTRUCTION/LOWER URINARY TRACT SYMPTOMS: ICD-10-CM

## 2023-01-12 DIAGNOSIS — I10 BENIGN ESSENTIAL HYPERTENSION: ICD-10-CM

## 2023-01-12 DIAGNOSIS — Z79.4 TYPE 2 DIABETES MELLITUS WITH COMPLICATION, WITH LONG-TERM CURRENT USE OF INSULIN (HCC): ICD-10-CM

## 2023-01-12 DIAGNOSIS — E11.8 TYPE 2 DIABETES MELLITUS WITH COMPLICATION, WITH LONG-TERM CURRENT USE OF INSULIN (HCC): ICD-10-CM

## 2023-01-12 DIAGNOSIS — K21.00 CHRONIC REFLUX ESOPHAGITIS: ICD-10-CM

## 2023-01-12 DIAGNOSIS — C91.10 CLL (CHRONIC LYMPHOCYTIC LEUKEMIA) (HCC): Primary | ICD-10-CM

## 2023-01-16 ENCOUNTER — TELEMEDICINE (OUTPATIENT)
Dept: FAMILY MEDICINE CLINIC | Facility: CLINIC | Age: 63
End: 2023-01-16

## 2023-01-16 DIAGNOSIS — Z00.00 MEDICARE ANNUAL WELLNESS VISIT, SUBSEQUENT: Primary | ICD-10-CM

## 2023-01-16 DIAGNOSIS — E11.69 HYPERLIPIDEMIA ASSOCIATED WITH TYPE 2 DIABETES MELLITUS (HCC): ICD-10-CM

## 2023-01-16 DIAGNOSIS — C91.10 CLL (CHRONIC LYMPHOCYTIC LEUKEMIA) (HCC): ICD-10-CM

## 2023-01-16 DIAGNOSIS — G47.33 OBSTRUCTIVE SLEEP APNEA: ICD-10-CM

## 2023-01-16 DIAGNOSIS — E11.65 UNCONTROLLED TYPE 2 DIABETES MELLITUS WITH HYPERGLYCEMIA (HCC): ICD-10-CM

## 2023-01-16 DIAGNOSIS — E66.9 OBESITY (BMI 30-39.9): ICD-10-CM

## 2023-01-16 DIAGNOSIS — E78.5 HYPERLIPIDEMIA ASSOCIATED WITH TYPE 2 DIABETES MELLITUS (HCC): ICD-10-CM

## 2023-01-16 DIAGNOSIS — F99 PSYCHIATRIC DISORDER: ICD-10-CM

## 2023-01-16 RX ORDER — QUETIAPINE FUMARATE 100 MG/1
TABLET, FILM COATED ORAL
COMMUNITY
Start: 2023-01-10

## 2023-01-16 RX ORDER — INSULIN GLARGINE 100 [IU]/ML
INJECTION, SOLUTION SUBCUTANEOUS
COMMUNITY

## 2023-01-16 NOTE — TELEPHONE ENCOUNTER
As a final attempt, a third outreach has been made via telephone call to facility  Please see Contacts section for details  This encounter will be closed and completed by end of day  Should we receive the requested information because of previous outreach attempts, the requested patient's chart will be updated appropriately       Thank you  Lidia Bell

## 2023-01-16 NOTE — PATIENT INSTRUCTIONS
Medicare Preventive Visit Patient Instructions  Thank you for completing your Welcome to Medicare Visit or Medicare Annual Wellness Visit today  Your next wellness visit will be due in one year (1/17/2024)  The screening/preventive services that you may require over the next 5-10 years are detailed below  Some tests may not apply to you based off risk factors and/or age  Screening tests ordered at today's visit but not completed yet may show as past due  Also, please note that scanned in results may not display below  Preventive Screenings:  Service Recommendations Previous Testing/Comments   Colorectal Cancer Screening  · Colonoscopy    · Fecal Occult Blood Test (FOBT)/Fecal Immunochemical Test (FIT)  · Fecal DNA/Cologuard Test  · Flexible Sigmoidoscopy Age: 39-70 years old   Colonoscopy: every 10 years (May be performed more frequently if at higher risk)  OR  FOBT/FIT: every 1 year  OR  Cologuard: every 3 years  OR  Sigmoidoscopy: every 5 years  Screening may be recommended earlier than age 39 if at higher risk for colorectal cancer  Also, an individualized decision between you and your healthcare provider will decide whether screening between the ages of 74-80 would be appropriate   Colonoscopy: 06/09/2021  FOBT/FIT: Not on file  Cologuard: Not on file  Sigmoidoscopy: Not on file    Screening Current     Prostate Cancer Screening Individualized decision between patient and health care provider in men between ages of 53-78   Medicare will cover every 12 months beginning on the day after your 50th birthday PSA: No results in last 5 years           Hepatitis C Screening Once for adults born between 80 and 1965  More frequently in patients at high risk for Hepatitis C Hep C Antibody: Not on file        Diabetes Screening 1-2 times per year if you're at risk for diabetes or have pre-diabetes Fasting glucose: 207 mg/dL (4/25/2022)  A1C: 9 2 % (9/29/2022)  Screening Not Indicated  History Diabetes   Cholesterol Screening Once every 5 years if you don't have a lipid disorder  May order more often based on risk factors  Lipid panel: 07/16/2021  Screening Not Indicated  History Lipid Disorder      Other Preventive Screenings Covered by Medicare:  1  Abdominal Aortic Aneurysm (AAA) Screening: covered once if your at risk  You're considered to be at risk if you have a family history of AAA or a male between the age of 73-68 who smoking at least 100 cigarettes in your lifetime  2  Lung Cancer Screening: covers low dose CT scan once per year if you meet all of the following conditions: (1) Age 50-69; (2) No signs or symptoms of lung cancer; (3) Current smoker or have quit smoking within the last 15 years; (4) You have a tobacco smoking history of at least 20 pack years (packs per day x number of years you smoked); (5) You get a written order from a healthcare provider  3  Glaucoma Screening: covered annually if you're considered high risk: (1) You have diabetes OR (2) Family history of glaucoma OR (3)  aged 48 and older OR (3)  American aged 72 and older  3  Osteoporosis Screening: covered every 2 years if you meet one of the following conditions: (1) Have a vertebral abnormality; (2) On glucocorticoid therapy for more than 3 months; (3) Have primary hyperparathyroidism; (4) On osteoporosis medications and need to assess response to drug therapy  5  HIV Screening: covered annually if you're between the age of 12-76  Also covered annually if you are younger than 13 and older than 72 with risk factors for HIV infection  For pregnant patients, it is covered up to 3 times per pregnancy      Immunizations:  Immunization Recommendations   Influenza Vaccine Annual influenza vaccination during flu season is recommended for all persons aged >= 6 months who do not have contraindications   Pneumococcal Vaccine   * Pneumococcal conjugate vaccine = PCV13 (Prevnar 13), PCV15 (Vaxneuvance), PCV20 (Prevnar 20)  * Pneumococcal polysaccharide vaccine = PPSV23 (Pneumovax) Adults 2364 years old: 1-3 doses may be recommended based on certain risk factors  Adults 72 years old: 1-2 doses may be recommended based off what pneumonia vaccine you previously received   Hepatitis B Vaccine 3 dose series if at intermediate or high risk (ex: diabetes, end stage renal disease, liver disease)   Tetanus (Td) Vaccine - COST NOT COVERED BY MEDICARE PART B Following completion of primary series, a booster dose should be given every 10 years to maintain immunity against tetanus  Td may also be given as tetanus wound prophylaxis  Tdap Vaccine - COST NOT COVERED BY MEDICARE PART B Recommended at least once for all adults  For pregnant patients, recommended with each pregnancy  Shingles Vaccine (Shingrix) - COST NOT COVERED BY MEDICARE PART B  2 shot series recommended in those aged 48 and above     Health Maintenance Due:      Topic Date Due   • Hepatitis C Screening  Never done   • Colorectal Cancer Screening  06/09/2031   • HIV Screening  Completed     Immunizations Due:      Topic Date Due   • COVID-19 Vaccine (4 - Booster for Curtis series) 11/24/2022     Advance Directives   What are advance directives? Advance directives are legal documents that state your wishes and plans for medical care  These plans are made ahead of time in case you lose your ability to make decisions for yourself  Advance directives can apply to any medical decision, such as the treatments you want, and if you want to donate organs  What are the types of advance directives? There are many types of advance directives, and each state has rules about how to use them  You may choose a combination of any of the following:  · Living will: This is a written record of the treatment you want  You can also choose which treatments you do not want, which to limit, and which to stop at a certain time  This includes surgery, medicine, IV fluid, and tube feedings  · Durable power of  for healthcare [place] [facility]): This is a written record that states who you want to make healthcare choices for you when you are unable to make them for yourself  This person, called a proxy, is usually a family member or a friend  You may choose more than 1 proxy  · Do not resuscitate (DNR) order:  A DNR order is used in case your heart stops beating or you stop breathing  It is a request not to have certain forms of treatment, such as CPR  A DNR order may be included in other types of advance directives  · Medical directive: This covers the care that you want if you are in a coma, near death, or unable to make decisions for yourself  You can list the treatments you want for each condition  Treatment may include pain medicine, surgery, blood transfusions, dialysis, IV or tube feedings, and a ventilator (breathing machine)  · Values history: This document has questions about your views, beliefs, and how you feel and think about life  This information can help others choose the care that you would choose  Why are advance directives important? An advance directive helps you control your care  Although spoken wishes may be used, it is better to have your wishes written down  Spoken wishes can be misunderstood, or not followed  Treatments may be given even if you do not want them  An advance directive may make it easier for your family to make difficult choices about your care  Weight Management   Why it is important to manage your weight:  Being overweight increases your risk of health conditions such as heart disease, high blood pressure, type 2 diabetes, and certain types of cancer  It can also increase your risk for osteoarthritis, sleep apnea, and other respiratory problems  Aim for a slow, steady weight loss  Even a small amount of weight loss can lower your risk of health problems    How to lose weight safely:  A safe and healthy way to lose weight is to eat fewer calories and get regular exercise  You can lose up about 1 pound a week by decreasing the number of calories you eat by 500 calories each day  Healthy meal plan for weight management:  A healthy meal plan includes a variety of foods, contains fewer calories, and helps you stay healthy  A healthy meal plan includes the following:  · Eat whole-grain foods more often  A healthy meal plan should contain fiber  Fiber is the part of grains, fruits, and vegetables that is not broken down by your body  Whole-grain foods are healthy and provide extra fiber in your diet  Some examples of whole-grain foods are whole-wheat breads and pastas, oatmeal, brown rice, and bulgur  · Eat a variety of vegetables every day  Include dark, leafy greens such as spinach, kale, danilo greens, and mustard greens  Eat yellow and orange vegetables such as carrots, sweet potatoes, and winter squash  · Eat a variety of fruits every day  Choose fresh or canned fruit (canned in its own juice or light syrup) instead of juice  Fruit juice has very little or no fiber  · Eat low-fat dairy foods  Drink fat-free (skim) milk or 1% milk  Eat fat-free yogurt and low-fat cottage cheese  Try low-fat cheeses such as mozzarella and other reduced-fat cheeses  · Choose meat and other protein foods that are low in fat  Choose beans or other legumes such as split peas or lentils  Choose fish, skinless poultry (chicken or turkey), or lean cuts of red meat (beef or pork)  Before you cook meat or poultry, cut off any visible fat  · Use less fat and oil  Try baking foods instead of frying them  Add less fat, such as margarine, sour cream, regular salad dressing and mayonnaise to foods  Eat fewer high-fat foods  Some examples of high-fat foods include french fries, doughnuts, ice cream, and cakes  · Eat fewer sweets  Limit foods and drinks that are high in sugar  This includes candy, cookies, regular soda, and sweetened drinks    Exercise:  Exercise at least 30 minutes per day on most days of the week  Some examples of exercise include walking, biking, dancing, and swimming  You can also fit in more physical activity by taking the stairs instead of the elevator or parking farther away from stores  Ask your healthcare provider about the best exercise plan for you  © Copyright [employer] 2018 Information is for End User's use only and may not be sold, redistributed or otherwise used for commercial purposes   All illustrations and images included in CareNotes® are the copyrighted property of [employer]  or [address] [place] [address] [employer]
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
## 2023-01-17 DIAGNOSIS — E11.65 UNCONTROLLED TYPE 2 DIABETES MELLITUS WITH HYPERGLYCEMIA (HCC): Primary | ICD-10-CM

## 2023-01-17 DIAGNOSIS — E11.8 TYPE 2 DIABETES MELLITUS WITH COMPLICATION, WITH LONG-TERM CURRENT USE OF INSULIN (HCC): Primary | ICD-10-CM

## 2023-01-17 DIAGNOSIS — Z79.4 TYPE 2 DIABETES MELLITUS WITH COMPLICATION, WITH LONG-TERM CURRENT USE OF INSULIN (HCC): Primary | ICD-10-CM

## 2023-01-17 PROBLEM — F31.9 BIPOLAR DISORDER, UNSPECIFIED (HCC): Status: ACTIVE | Noted: 2021-11-28

## 2023-01-17 PROBLEM — I13.0 HYPERTENSIVE HEART AND CHRONIC KIDNEY DISEASE WITH HEART FAILURE AND STAGE 1 THROUGH STAGE 4 CHRONIC KIDNEY DISEASE, OR UNSPECIFIED CHRONIC KIDNEY DISEASE (HCC): Status: ACTIVE | Noted: 2021-11-28

## 2023-01-17 RX ORDER — LIRAGLUTIDE 6 MG/ML
INJECTION SUBCUTANEOUS
Qty: 9 ML | Refills: 0 | Status: SHIPPED | OUTPATIENT
Start: 2023-01-17

## 2023-01-17 RX ORDER — INSULIN ASPART 100 [IU]/ML
INJECTION, SOLUTION INTRAVENOUS; SUBCUTANEOUS
Qty: 15 ML | Refills: 1 | Status: SHIPPED | OUTPATIENT
Start: 2023-01-17

## 2023-01-19 ENCOUNTER — APPOINTMENT (OUTPATIENT)
Dept: LAB | Facility: CLINIC | Age: 63
End: 2023-01-19

## 2023-01-19 DIAGNOSIS — I10 BENIGN ESSENTIAL HYPERTENSION: ICD-10-CM

## 2023-01-19 DIAGNOSIS — E66.01 MORBID OBESITY (HCC): ICD-10-CM

## 2023-01-19 DIAGNOSIS — E78.5 DYSLIPIDEMIA: ICD-10-CM

## 2023-01-19 DIAGNOSIS — N40.0 BPH WITHOUT OBSTRUCTION/LOWER URINARY TRACT SYMPTOMS: ICD-10-CM

## 2023-01-19 DIAGNOSIS — E11.8 TYPE 2 DIABETES MELLITUS WITH COMPLICATION, WITH LONG-TERM CURRENT USE OF INSULIN (HCC): ICD-10-CM

## 2023-01-19 DIAGNOSIS — Z79.4 TYPE 2 DIABETES MELLITUS WITH COMPLICATION, WITH LONG-TERM CURRENT USE OF INSULIN (HCC): ICD-10-CM

## 2023-01-19 DIAGNOSIS — Z12.5 PROSTATE CANCER SCREENING: ICD-10-CM

## 2023-01-19 LAB
ALBUMIN SERPL BCP-MCNC: 3.2 G/DL (ref 3.5–5)
ALP SERPL-CCNC: 102 U/L (ref 46–116)
ALT SERPL W P-5'-P-CCNC: 29 U/L (ref 12–78)
AMYLASE SERPL-CCNC: 30 IU/L (ref 25–115)
ANION GAP SERPL CALCULATED.3IONS-SCNC: 5 MMOL/L (ref 4–13)
AST SERPL W P-5'-P-CCNC: 14 U/L (ref 5–45)
BILIRUB SERPL-MCNC: 1.08 MG/DL (ref 0.2–1)
BUN SERPL-MCNC: 17 MG/DL (ref 5–25)
CALCIUM ALBUM COR SERPL-MCNC: 10.2 MG/DL (ref 8.3–10.1)
CALCIUM SERPL-MCNC: 9.6 MG/DL (ref 8.3–10.1)
CHLORIDE SERPL-SCNC: 93 MMOL/L (ref 96–108)
CHOLEST SERPL-MCNC: 87 MG/DL
CO2 SERPL-SCNC: 31 MMOL/L (ref 21–32)
CREAT SERPL-MCNC: 0.75 MG/DL (ref 0.6–1.3)
ERYTHROCYTE [DISTWIDTH] IN BLOOD BY AUTOMATED COUNT: 12.7 % (ref 11.6–15.1)
GFR SERPL CREATININE-BSD FRML MDRD: 97 ML/MIN/1.73SQ M
GLUCOSE SERPL-MCNC: 351 MG/DL (ref 65–140)
HCT VFR BLD AUTO: 42.6 % (ref 36.5–49.3)
HDLC SERPL-MCNC: 37 MG/DL
HGB BLD-MCNC: 13.8 G/DL (ref 12–17)
LDLC SERPL CALC-MCNC: 20 MG/DL (ref 0–100)
LIPASE SERPL-CCNC: 108 U/L (ref 73–393)
MAGNESIUM SERPL-MCNC: 2.2 MG/DL (ref 1.6–2.6)
MCH RBC QN AUTO: 31.1 PG (ref 26.8–34.3)
MCHC RBC AUTO-ENTMCNC: 32.4 G/DL (ref 31.4–37.4)
MCV RBC AUTO: 96 FL (ref 82–98)
NONHDLC SERPL-MCNC: 50 MG/DL
PLATELET # BLD AUTO: 167 THOUSANDS/UL (ref 149–390)
PMV BLD AUTO: 10.7 FL (ref 8.9–12.7)
POTASSIUM SERPL-SCNC: 4.1 MMOL/L (ref 3.5–5.3)
PROT SERPL-MCNC: 7.2 G/DL (ref 6.4–8.4)
PSA SERPL-MCNC: 12.8 NG/ML (ref 0–4)
RBC # BLD AUTO: 4.44 MILLION/UL (ref 3.88–5.62)
SODIUM SERPL-SCNC: 129 MMOL/L (ref 135–147)
TRIGL SERPL-MCNC: 150 MG/DL
TSH SERPL DL<=0.05 MIU/L-ACNC: 0.85 UIU/ML (ref 0.45–4.5)
WBC # BLD AUTO: 13.35 THOUSAND/UL (ref 4.31–10.16)

## 2023-01-20 ENCOUNTER — HOSPITAL ENCOUNTER (EMERGENCY)
Facility: HOSPITAL | Age: 63
Discharge: HOME | End: 2023-01-20
Attending: EMERGENCY MEDICINE

## 2023-01-20 VITALS
TEMPERATURE: 98.2 F | HEART RATE: 86 BPM | DIASTOLIC BLOOD PRESSURE: 68 MMHG | HEIGHT: 71 IN | OXYGEN SATURATION: 95 % | WEIGHT: 255.29 LBS | BODY MASS INDEX: 35.74 KG/M2 | SYSTOLIC BLOOD PRESSURE: 134 MMHG | RESPIRATION RATE: 18 BRPM

## 2023-01-20 DIAGNOSIS — B37.2 CANDIDIASIS, CUTANEOUS: ICD-10-CM

## 2023-01-20 DIAGNOSIS — M54.9 CHRONIC BACK PAIN: ICD-10-CM

## 2023-01-20 DIAGNOSIS — G89.29 CHRONIC BACK PAIN: ICD-10-CM

## 2023-01-20 LAB
EST. AVERAGE GLUCOSE BLD GHB EST-MCNC: 286 MG/DL
HBA1C MFR BLD: 11.6 %

## 2023-01-20 RX ORDER — NYSTATIN 100000 U/G
CREAM TOPICAL 2 TIMES DAILY
Qty: 30 G | Refills: 0 | Status: SHIPPED | OUTPATIENT
Start: 2023-01-20

## 2023-01-20 RX ORDER — CYCLOBENZAPRINE HCL 10 MG
10 TABLET ORAL ONCE
Status: COMPLETED | OUTPATIENT
Start: 2023-01-20 | End: 2023-01-20

## 2023-01-20 RX ORDER — NYSTATIN 100000 U/G
OINTMENT TOPICAL 2 TIMES DAILY
Status: DISCONTINUED | OUTPATIENT
Start: 2023-01-20 | End: 2023-01-20 | Stop reason: HOSPADM

## 2023-01-20 RX ADMIN — NYSTATIN 1 APPLICATION: 100000 OINTMENT TOPICAL at 10:48

## 2023-01-20 RX ADMIN — CYCLOBENZAPRINE HYDROCHLORIDE 10 MG: 10 TABLET, FILM COATED ORAL at 12:00

## 2023-01-20 NOTE — OCCUPATIONAL THERAPY NOTE
Occupational Therapy Evaluation       01/20/23 1155   Note Type   Note type Evaluation   Pain Assessment   Pain Assessment Tool 0-10   Pain Score 9   Pain Location/Orientation Orientation: Lower; Location: Back   Home Living   Type of Home Apartment   Home Layout One level;Elevator   Bathroom Shower/Tub Walk-in shower   Bathroom Toilet Raised   Bathroom Equipment Grab bars in shower; [address] Hemi[address];Cane;Electric scooter;Reacher  (Long handled sponge; home O2)   Additional Comments Patient at baseline ambulates short distances only; uses electric scooter in apartment, drives to bathroom door then walks to toilet/sink/shower with either RW or cane; Independent in ADLs; has reacher and long handled sponge, reports at baseline independent in ADLs and mobility, home health aides assist with iADls   Prior Function   Level of [place] Independent with ADLs; Independent with functional mobility; Needs assistance with IADLS   Lives With Alone   Receives Help From Home health  (HHA 5 days/week, 3-4 hours/day)   IADLs Family/Friend/Other provides meals; Family/Friend/Other provides medication management  (Gets Mom's meals)   General   Additional Pertinent History Patient presented to hospital reporting increase in back pain   ADL   Eating Assistance 7  [address]  5  Supervision/Setup   Additional Comments Able to don/doff bilateral slippers independently; given pants for dressing task; able to thread over LEs but unable to pull up due to reports of back pain; patient demonstrating minimal effort in attempting to pull pants up; reports has a reacher at home that can assist   Transfers   Sit to Stand 7  Independent   Stand to Sit 7  Independent   Functional Mobility   Functional Mobility 5  Supervision   Additional Comments Patient ambulated short household distance in room with RW   Balance   Static Sitting Good   Dynamic Sitting Fair +   Static Standing Fair +   Dynamic Standing Fair +   Activity Tolerance   Activity Tolerance Patient tolerated treatment well   RUE Assessment   RUE Assessment WFL   LUE Assessment   LUE Assessment WFL   Cognition   Overall Cognitive Status WFL   Arousal/Participation Alert; Cooperative   Attention Within functional limits   Orientation Level Oriented X4   Following Commands Follows all commands and directions without difficulty   Assessment   Limitation Decreased ADL status; Decreased UE strength;Decreased high-level ADLs   Prognosis Good   Assessment Patient evaluated by Occupational Therapy  Patient admitted with back pain  The patients occupational profile, medical and therapy history includes a expanded review of medical and/or therapy records and additional review of physical, cognitive, or psychosocial history related to current functional performance  Comorbidities affecting functional mobility and ADLS include: COPD, DM, bipolar disorder, CAD, obesity, HTN, sciatica  Prior to admission, patient was independent with functional mobility with RW or cane short distances, independent with ADLS and requiring assist for IADLS  The evaluation identifies the following performance deficits: weakness, impaired balance, decreased endurance, increased fall risk, new onset of impairment of functional mobility, decreased ADLS, decreased IADLS, pain, decreased activity tolerance, decreased safety awareness, impaired judgement and decreased strength, that result in activity limitations and/or participation restrictions   This evaluation requires clinical decision making of moderate complexity, because the patient may present with comorbidities that affect occupational performance and required minimal or moderate modification of tasks or assistance with the consideration of several treatment options  The Barthel Index was used as a functional outcome tool presenting with a score of Barthel Index Score: 70, indicating moderate limitations of functional mobility and ADLS  The patient's raw score on the AM-PAC Daily Activity inpatient short form is 22, standardized score is 47 1, greater than 39 4  Patients at this level are likely to benefit from DC to home  Please refer to the recommendation of the Occupational Therapist for safe DC planning  Patient will benefit from skilled Occupational Therapy services to address above deficits and facilitate a safe return to prior level of function  Goals   Patient Goals "I would like to go to rehab for one week"   [facility] Time Frame 1-3   Short Term Goal  Goals established to promote Patient Goals: "I would like to go to rehab for one week":  Bathing: supervision; Upper Body Dressing independent; Lower Body Dressing: supervision; Toileting: independent; Patient will increase standing tolerance to 10 minutes during ADL task to decrease assistance level and decrease fall risk; Patient will increase functional mobility to and from bathroom with rolling walker independently to increase performance with ADLS and to use a toilet; Patient will tolerate 10 minutes of UE ROM/strengthening to increase general activity tolerance and performance in ADLS/IADLS; Patient will improve functional activity tolerance to 10 minutes of sustained functional tasks to increase participation in basic self-care and decrease assistance level;  Patient will be able to to verbalize understanding and perform energy conservation/proper body mechanics during ADLS and functional mobility at least 75% of the time with minimal cueing to decrease signs of fatigue and increase stamina to return to prior level of function;   LTG Time Frame 3-7   Long Term Goal Bathing: independent;  Lower Body Dressing: independent; Patient will increase ambulatory standard toilet transfer to independent with single point cane to increase performance and safety with ADLS and functional mobility; Patient will increase standing tolerance to 15 minutes during ADL task to decrease assistance level and decrease fall risk; Patient will increase functional mobility to and from bathroom with single point cane independently to increase performance with ADLS and to use a toilet; Patient will tolerate 15 minutes of UE ROM/strengthening to increase general activity tolerance and performance in ADLS/IADLS; Patient will improve functional activity tolerance to 15 minutes of sustained functional tasks to increase participation in basic self-care and decrease assistance level;  Patient will be able to to verbalize understanding and perform energy conservation/proper body mechanics during ADLS and functional mobility at least 90% of the time with no cueing to decrease signs of fatigue and increase stamina to return to prior level of function; Patient will increase static/dynamic sitting balance to good to improve the ability to sit at edge of bed or on a chair for ADLS;  Patient will increase static/dynamic standing balance to good to improve postural stability and decrease fall risk during standing ADLS and transfers  Pt will score >/= 24/24 on AM-PAC Daily Activity Inpatient scale to promote safe independence with ADLs and functional mobility; Pt will score >/= 90/100 on Barthel Index in order to decrease caregiver assistance needed and increase ability to perform ADLs and functional mobility  Plan   Treatment Interventions ADL retraining;Functional transfer training;UE strengthening/ROM; Endurance training;Patient/family training;Equipment evaluation/education;Continued evaluation   Goal Expiration Date 01/27/23   OT Frequency 3-5x/wk   Recommendation   OT Discharge Recommendation No rehabilitation needs   Additional Comments  Recommend use of LBAE for ADLs and assist from home health aides as needed   AM-PAC Daily Activity Inpatient   Lower Body Dressing 3   Bathing 3   Toileting 4   Upper Body Dressing 4   Grooming 4   Eating 4   Daily Activity Raw Score 22   Daily Activity Standardized Score (Calc for Raw Score >=11) 47  1   AM-PAC Applied Cognition Inpatient   Following a Speech/Presentation 4   Understanding Ordinary Conversation 4   Taking Medications 4   Remembering Where Things Are Placed or Put Away 4   Remembering List of 4-5 Errands 4   Taking Care of Complicated Tasks 4   Applied Cognition Raw Score 24   Applied Cognition Standardized Score 62 21   Barthel Index   Feeding 10   Bathing 0   Grooming Score 5   Dressing Score 5   Bladder Score 10   Bowels Score 10   Toilet Use Score 10   Transfers (Bed/Chair) Score 15   Mobility (Level Surface) Score 0   Stairs Score 5   Barthel Index Score 79   Licensure   [place] License Number  Tylor Obrienburke, OTR/L [id number]
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
## 2023-01-20 NOTE — PLAN OF CARE
Problem: PHYSICAL THERAPY ADULT  Goal: Performs mobility at highest level of function for planned discharge setting  See evaluation for individualized goals  Description: Treatment/Interventions: ADL retraining, Functional transfer training, LE strengthening/ROM, Therapeutic exercise, Endurance training, Patient/family training, Equipment eval/education, Bed mobility, Gait training, Compensatory technique education          See flowsheet documentation for full assessment, interventions and recommendations  Outcome: Adequate for Discharge  Note: Prognosis: Good  Problem List: Decreased strength, Decreased endurance, Pain  Assessment: Patient seen for Physical Therapy evaluation  Patient admitted with low back pain and fatigue  Comorbidities affecting patient's physical performance include: Psychiatric disorder, COPD, DM, fatigue, SHAVONNE and COPD overlap syndrome, morbid obesity, CAD, essential hypertension, diabetic neuropathy, panic disorder, restrictive lung disease, chronic A  fib, bipolar disorder, CLL, lumbar postlaminectomy syndrome, lumbar radiculopathy, chronic pain syndrome  Personal factors affecting patient at time of initial evaluation include: lives in one story house and ambulating with assistive device  Prior to admission, patient was independent with functional mobility with cane, RW or E-scooter, independent with ADLS, living alone in one story home with no steps to enter and ambulating household distance  Please find objective findings from Physical Therapy assessment regarding body systems outlined above with impairments and limitations including weakness, decreased endurance, gait deviations, pain, decreased activity tolerance and decreased functional mobility tolerance  The Barthel Index was used as a functional outcome tool presenting with a score of Barthel Index Score: 70 today indicating moderate limitations of functional mobility and ADLS    Patient's clinical presentation is currently evolving as seen in patient's presentation of changing level of pain  Pt would benefit from continued Physical Therapy as an OP for modalities and exercise for LBP  Pt is not appropriate for skilled rehab services at this time or IP PT  As demonstrated by objective findings, the assigned level of complexity for this evaluation is moderate  The patient's AM-PAC Basic Mobility Inpatient Short Form Raw Score is 23  A Raw score of greater than 16 suggests the patient may benefit from discharge to home  Please also refer to the recommendation of the Physical Therapist for safe discharge planning  PT Discharge Recommendation: Home with outpatient rehabilitation (for low back pain)    See flowsheet documentation for full assessment

## 2023-01-20 NOTE — PHYSICAL THERAPY NOTE
PT EVALUATION     01/20/23 1140   PT Last Visit   PT Visit Date 01/20/23   Note Type   Note type Evaluation   Pain Assessment   Pain Assessment Tool 0-10   Pain Score 9   Pain Location/Orientation Orientation: Lower; Location: Back   Pain Onset/Description Onset: Ongoing   Hospital Pain Intervention(s) Repositioned; Ambulation/increased activity; Emotional support; Rest   Restrictions/Precautions   Other Precautions Pain   Home Living   Type of Home Apartment   Home Layout One level;Elevator   Bathroom Shower/Tub Walk-in shower   Bathroom Toilet Standard   Bathroom Equipment Toilet raiser;Grab bars in shower;Hand-held shower;Grab bars around toilet; Other (Comment)  (LH sponge)   Home Equipment Electric scooter;Cane  (RW;3L home O2 24/7)   Prior Function   Level of [place] Independent with ADLs; Independent with functional mobility; Needs assistance with IADLS   Lives With Alone   Receives Help From Home health  (5 days/week, 3 to 4 hours/day for cleaning, shopping and cooking)   Comments Patient ambulates with a roller walker or cane short distances in his apartment or uses his E-scooter prior to admission   General   Additional Pertinent History Patient seen in the ED by PT  Per chart patient admitted with an abnormal laboratory value, per MD for increased WBC  Patient states he is here because he is fatigued and has an increase in his low back pain and would like to be transferred to rehab for a week     Family/Caregiver Present No   Cognition   Overall Cognitive Status WFL   Arousal/Participation Cooperative   Orientation Level Oriented X4   Following Commands Follows all commands and directions without difficulty   Subjective   Subjective Patient reports 9/10 low back pain   RLE Assessment   RLE Assessment WFL  (Grossly 3/5, patient unable to tolerate MMT due to low back pain)   LLE Assessment   LLE Assessment WFL  (Grossly 3/5, patient unable to tolerate MMT due to low back pain)   Bed Mobility   Supine to Sit 7  Independent   Transfers   Sit to Stand 7  Independent   Stand to Sit 7  Independent   Ambulation/Elevation   Gait pattern Decreased foot clearance;Decreased heel strike  (Guarded, hesitant due to low back pain; decreased gait speed)   Gait Assistance   (Modified independent/independent)   Assistive Device Rolling walker   Distance 25 feet with change in direction   Balance   Static Sitting Normal   Static Standing Good  (With roller walker)   Ambulatory Good  (With RW)   Activity Tolerance   Activity Tolerance Patient limited by pain   Assessment   Prognosis Good   Problem List Decreased strength;Decreased endurance;Pain   Assessment Patient seen for Physical Therapy evaluation  Patient admitted with low back pain and fatigue  Comorbidities affecting patient's physical performance include: Psychiatric disorder, COPD, DM, fatigue, SHAVONNE and COPD overlap syndrome, morbid obesity, CAD, essential hypertension, diabetic neuropathy, panic disorder, restrictive lung disease, chronic A  fib, bipolar disorder, CLL, lumbar postlaminectomy syndrome, lumbar radiculopathy, chronic pain syndrome  Personal factors affecting patient at time of initial evaluation include: lives in one story house and ambulating with assistive device  Prior to admission, patient was independent with functional mobility with cane, RW or E-scooter, independent with ADLS, living alone in one story home with no steps to enter and ambulating household distance  Please find objective findings from Physical Therapy assessment regarding body systems outlined above with impairments and limitations including weakness, decreased endurance, gait deviations, pain, decreased activity tolerance and decreased functional mobility tolerance  The Barthel Index was used as a functional outcome tool presenting with a score of Barthel Index Score: 70 today indicating moderate limitations of functional mobility and ADLS    Patient's clinical presentation is currently evolving as seen in patient's presentation of changing level of pain  Pt would benefit from continued Physical Therapy as an OP for modalities and exercise for LBP  Pt is not appropriate for skilled rehab services at this time or IP PT  As demonstrated by objective findings, the assigned level of complexity for this evaluation is moderate  The patient's AM-PAC Basic Mobility Inpatient Short Form Raw Score is 23  A Raw score of greater than 16 suggests the patient may benefit from discharge to home  Please also refer to the recommendation of the Physical Therapist for safe discharge planning  Goals   Patient Goals To go to rehab for 1 week   Plan   Treatment/Interventions ADL retraining;Functional transfer training;LE strengthening/ROM; Therapeutic exercise; Endurance training;Patient/family training;Equipment eval/education; Bed mobility;Gait training; Compensatory technique education   PT Frequency Other (Comment)  (1 visit only, patient is independent)   Recommendation   PT Discharge Recommendation Home with outpatient rehabilitation  (for low back pain - modalities and exercise)   Additional Comments Pt would like to [facility] to the [facility] one week due to fatigue/tired and LBP  He states he has done this in the past  Pt is independent at baseline with functional mobility and gait with the RW, also has an E-scooter and a cane  Pt has home health 5x/wk for 3-4 hrs per day  Pt would benefit from home with OP PT for his LBP for modalities and exercise   ED MD aware of PT recommendation   AM-[facility] Basic Mobility Inpatient   Turning in Flat Bed Without Bedrails 4   Lying on Back to Sitting on Edge of Flat Bed Without Bedrails 4   Moving Bed to Chair 4   Standing Up From Chair Using Arms 4   Walk in Room 4   Climb 3-5 Stairs With Railing 3   Basic Mobility Inpatient Raw Score 23   Basic Mobility Standardized Score 50 88   Highest Level Of Mobility   [facility] Goal 7: Walk 25 feet or more   [facility] Achieved 7: Walk 25 feet or more   Barthel Index   Feeding 10   Bathing 0   Grooming Score 5   Dressing Score 5   Bladder Score 10   Bowels Score 10   Toilet Use Score 10   Transfers (Bed/Chair) Score 15   Mobility (Level Surface) Score 0   Stairs Score 5   Barthel Index Score 70   End of Consult   Patient Position at End of Consult Bedside chair; All needs within reach   [employer] Number  [address] [place] [address] [id number]

## 2023-01-21 DIAGNOSIS — E11.8 TYPE 2 DIABETES MELLITUS WITH COMPLICATION, WITH LONG-TERM CURRENT USE OF INSULIN (HCC): ICD-10-CM

## 2023-01-21 DIAGNOSIS — C91.10 CLL (CHRONIC LYMPHOCYTIC LEUKEMIA) (HCC): Primary | ICD-10-CM

## 2023-01-21 DIAGNOSIS — G47.33 OSA AND COPD OVERLAP SYNDROME (HCC): Chronic | ICD-10-CM

## 2023-01-21 DIAGNOSIS — J44.9 OSA AND COPD OVERLAP SYNDROME (HCC): Chronic | ICD-10-CM

## 2023-01-21 DIAGNOSIS — F99 PSYCHIATRIC DISORDER: ICD-10-CM

## 2023-01-21 DIAGNOSIS — Z79.4 TYPE 2 DIABETES MELLITUS WITH COMPLICATION, WITH LONG-TERM CURRENT USE OF INSULIN (HCC): ICD-10-CM

## 2023-01-23 ENCOUNTER — TELEPHONE (OUTPATIENT)
Dept: PAIN MEDICINE | Facility: CLINIC | Age: 63
End: 2023-01-23

## 2023-01-23 NOTE — TELEPHONE ENCOUNTER
Destiney Flahertyer; [facility] 15 minutes ago (2:11 PM)     HS  [employer] cannot be scheduled more than a week in advance, I will call tomorrow and set up the [employer] and advise the patient  Attempted to call pt to notify  Voice mail box is full  Unable to leave message

## 2023-01-23 NOTE — TELEPHONE ENCOUNTER
Caller: [place]     Doctor: Vivian Nunes    Reason for call: pt is scheduled on 1/31/23 for his OVS & is requesting [employer] transportation service    Call back#: [phone]

## 2023-01-27 ENCOUNTER — HOSPITAL ENCOUNTER (EMERGENCY)
Facility: HOSPITAL | Age: 63
Discharge: HOME | End: 2023-01-27
Attending: EMERGENCY MEDICINE

## 2023-01-27 ENCOUNTER — APPOINTMENT (EMERGENCY)
Dept: RADIOLOGY | Facility: HOSPITAL | Age: 63
End: 2023-01-27

## 2023-01-27 VITALS
TEMPERATURE: 98 F | HEART RATE: 89 BPM | BODY MASS INDEX: 35.46 KG/M2 | SYSTOLIC BLOOD PRESSURE: 160 MMHG | WEIGHT: 253.31 LBS | OXYGEN SATURATION: 93 % | HEIGHT: 71 IN | DIASTOLIC BLOOD PRESSURE: 76 MMHG | RESPIRATION RATE: 20 BRPM

## 2023-01-27 DIAGNOSIS — M54.9 CHRONIC BACK PAIN: ICD-10-CM

## 2023-01-27 DIAGNOSIS — G89.29 CHRONIC BACK PAIN: ICD-10-CM

## 2023-01-27 DIAGNOSIS — N39.0 UTI (URINARY TRACT INFECTION): Primary | ICD-10-CM

## 2023-01-27 DIAGNOSIS — R31.9 HEMATURIA: ICD-10-CM

## 2023-01-27 LAB
ALBUMIN SERPL BCP-MCNC: 3.3 G/DL (ref 3.5–5)
ALP SERPL-CCNC: 82 U/L (ref 46–116)
ALT SERPL W P-5'-P-CCNC: 32 U/L (ref 12–78)
ANION GAP SERPL CALCULATED.3IONS-SCNC: 13 MMOL/L (ref 4–13)
APTT PPP: 27 SECONDS (ref 23–37)
AST SERPL W P-5'-P-CCNC: 21 U/L (ref 5–45)
BACTERIA UR QL AUTO: ABNORMAL /HPF
BASOPHILS # BLD MANUAL: 0 THOUSAND/UL (ref 0–0.1)
BASOPHILS NFR MAR MANUAL: 0 % (ref 0–1)
BILIRUB SERPL-MCNC: 0.45 MG/DL (ref 0.2–1)
BILIRUB UR QL STRIP: NEGATIVE
BUN SERPL-MCNC: 15 MG/DL (ref 5–25)
CALCIUM ALBUM COR SERPL-MCNC: 9 MG/DL (ref 8.3–10.1)
CALCIUM SERPL-MCNC: 8.4 MG/DL (ref 8.3–10.1)
CHLORIDE SERPL-SCNC: 96 MMOL/L (ref 96–108)
CLARITY UR: ABNORMAL
CO2 SERPL-SCNC: 24 MMOL/L (ref 21–32)
COLOR UR: ABNORMAL
CREAT SERPL-MCNC: 0.96 MG/DL (ref 0.6–1.3)
EOSINOPHIL # BLD MANUAL: 0 THOUSAND/UL (ref 0–0.4)
EOSINOPHIL NFR BLD MANUAL: 0 % (ref 0–6)
ERYTHROCYTE [DISTWIDTH] IN BLOOD BY AUTOMATED COUNT: 12.8 % (ref 11.6–15.1)
GFR SERPL CREATININE-BSD FRML MDRD: 83 ML/MIN/1.73SQ M
GLUCOSE SERPL-MCNC: 471 MG/DL (ref 65–140)
GLUCOSE UR STRIP-MCNC: ABNORMAL MG/DL
HCT VFR BLD AUTO: 41.1 % (ref 36.5–49.3)
HGB BLD-MCNC: 14.1 G/DL (ref 12–17)
HGB UR QL STRIP.AUTO: ABNORMAL
INR PPP: 1.2 (ref 0.84–1.19)
KETONES UR STRIP-MCNC: NEGATIVE MG/DL
LEUKOCYTE ESTERASE UR QL STRIP: ABNORMAL
LYMPHOCYTES # BLD AUTO: 17 % (ref 14–44)
LYMPHOCYTES # BLD AUTO: 3.92 THOUSAND/UL (ref 0.6–4.47)
MCH RBC QN AUTO: 32.1 PG (ref 26.8–34.3)
MCHC RBC AUTO-ENTMCNC: 34.3 G/DL (ref 31.4–37.4)
MCV RBC AUTO: 94 FL (ref 82–98)
MONOCYTES # BLD AUTO: 0.92 THOUSAND/UL (ref 0–1.22)
MONOCYTES NFR BLD: 4 % (ref 4–12)
NEUTROPHILS # BLD MANUAL: 17.07 THOUSAND/UL (ref 1.85–7.62)
NEUTS BAND NFR BLD MANUAL: 2 % (ref 0–8)
NEUTS SEG NFR BLD AUTO: 72 % (ref 43–75)
NITRITE UR QL STRIP: NEGATIVE
NON-SQ EPI CELLS URNS QL MICRO: ABNORMAL /HPF
PH UR STRIP.AUTO: 6 [PH]
PLATELET # BLD AUTO: 245 THOUSANDS/UL (ref 149–390)
PLATELET BLD QL SMEAR: ADEQUATE
PMV BLD AUTO: 9.1 FL (ref 8.9–12.7)
POTASSIUM SERPL-SCNC: 4.5 MMOL/L (ref 3.5–5.3)
PROT SERPL-MCNC: 6.6 G/DL (ref 6.4–8.4)
PROT UR STRIP-MCNC: NEGATIVE MG/DL
PROTHROMBIN TIME: 15.4 SECONDS (ref 11.6–14.5)
RBC # BLD AUTO: 4.39 MILLION/UL (ref 3.88–5.62)
RBC #/AREA URNS AUTO: ABNORMAL /HPF
RBC MORPH BLD: NORMAL
SMUDGE CELLS BLD QL SMEAR: PRESENT
SODIUM SERPL-SCNC: 133 MMOL/L (ref 135–147)
SP GR UR STRIP.AUTO: 1.01 (ref 1–1.03)
UROBILINOGEN UR QL STRIP.AUTO: 0.2 E.U./DL
VARIANT LYMPHS # BLD AUTO: 5 %
WBC # BLD AUTO: 23.07 THOUSAND/UL (ref 4.31–10.16)
WBC #/AREA URNS AUTO: ABNORMAL /HPF

## 2023-01-27 RX ORDER — CEPHALEXIN 500 MG/1
500 CAPSULE ORAL 2 TIMES DAILY
Qty: 14 CAPSULE | Refills: 0 | Status: SHIPPED | OUTPATIENT
Start: 2023-01-27 | End: 2023-01-30

## 2023-01-27 RX ORDER — OXYCODONE HYDROCHLORIDE AND ACETAMINOPHEN 5; 325 MG/1; MG/1
1 TABLET ORAL ONCE
Status: COMPLETED | OUTPATIENT
Start: 2023-01-27 | End: 2023-01-27

## 2023-01-27 RX ORDER — CYCLOBENZAPRINE HCL 10 MG
10 TABLET ORAL 3 TIMES DAILY
Qty: 12 TABLET | Refills: 0 | Status: SHIPPED | OUTPATIENT
Start: 2023-01-27 | End: 2023-01-31

## 2023-01-27 RX ORDER — GABAPENTIN 300 MG/1
600 CAPSULE ORAL 3 TIMES DAILY
Qty: 24 CAPSULE | Refills: 0 | Status: SHIPPED | OUTPATIENT
Start: 2023-01-27 | End: 2023-01-31

## 2023-01-27 RX ORDER — CEFTRIAXONE 1 G/50ML
1000 INJECTION, SOLUTION INTRAVENOUS ONCE
Status: COMPLETED | OUTPATIENT
Start: 2023-01-27 | End: 2023-01-27

## 2023-01-27 RX ADMIN — OXYCODONE HYDROCHLORIDE AND ACETAMINOPHEN 1 TABLET: 5; 325 TABLET ORAL at 14:10

## 2023-01-27 RX ADMIN — CEFTRIAXONE 1000 MG: 1 INJECTION, SOLUTION INTRAVENOUS at 16:25

## 2023-01-27 RX ADMIN — SODIUM CHLORIDE 1000 ML: 0.9 INJECTION, SOLUTION INTRAVENOUS at 14:11
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
## 2023-01-27 NOTE — ED NOTES
Pt requested that his family member, Allie Whitney, who lives at [facility] be made aware that he is in the ER for back pain and hematuria  Message was left with this information with a [facility] employee       Tong Mares RN  01/27/23 6941

## 2023-01-29 LAB
BACTERIA UR CULT: ABNORMAL
BACTERIA UR CULT: ABNORMAL

## 2023-01-30 ENCOUNTER — TELEPHONE (OUTPATIENT)
Dept: FAMILY MEDICINE CLINIC | Facility: CLINIC | Age: 63
End: 2023-01-30

## 2023-01-30 RX ORDER — CIPROFLOXACIN 500 MG/1
500 TABLET, FILM COATED ORAL 2 TIMES DAILY
Qty: 14 TABLET | Refills: 0 | Status: SHIPPED | OUTPATIENT
Start: 2023-01-30 | End: 2023-02-06

## 2023-01-31 ENCOUNTER — OFFICE VISIT (OUTPATIENT)
Dept: PAIN MEDICINE | Facility: CLINIC | Age: 63
End: 2023-01-31

## 2023-01-31 VITALS
HEIGHT: 71 IN | DIASTOLIC BLOOD PRESSURE: 75 MMHG | HEART RATE: 75 BPM | SYSTOLIC BLOOD PRESSURE: 145 MMHG | TEMPERATURE: 98 F | BODY MASS INDEX: 35.33 KG/M2

## 2023-01-31 DIAGNOSIS — G62.9 PERIPHERAL POLYNEUROPATHY: ICD-10-CM

## 2023-01-31 DIAGNOSIS — M47.816 FACET ARTHROPATHY, LUMBAR: ICD-10-CM

## 2023-01-31 DIAGNOSIS — M79.18 MYOFASCIAL PAIN SYNDROME: ICD-10-CM

## 2023-01-31 DIAGNOSIS — G89.4 CHRONIC PAIN SYNDROME: Primary | ICD-10-CM

## 2023-01-31 DIAGNOSIS — M47.816 LUMBAR SPONDYLOSIS: ICD-10-CM

## 2023-01-31 RX ORDER — GABAPENTIN 600 MG/1
600 TABLET ORAL 3 TIMES DAILY
Qty: 270 TABLET | Refills: 1 | Status: SHIPPED | OUTPATIENT
Start: 2023-01-31

## 2023-01-31 RX ORDER — CYCLOBENZAPRINE HCL 10 MG
10 TABLET ORAL 3 TIMES DAILY PRN
Qty: 180 TABLET | Refills: 1 | Status: SHIPPED | OUTPATIENT
Start: 2023-01-31
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
## 2023-01-31 NOTE — PATIENT INSTRUCTIONS
Medial Branch Blocks     What are the facet joints? The facet joints are the articulations or connections between the vertebrae in the spine  They are like any other joint in the body like the knee or elbow that enable the bending or twisting movements of the spine  The facet joints help support the weight and control movement between individual vertebrae  The facet joints can get inflamed secondary to injury or arthritis and cause pain and stiffness  To help determine whether the facet joints are a source of pain, it is necessary to perform a nerve block along the nerves that supply sensation to your joints  Facet joints are innervated or "supplied" by nerves called medial branches  These nerves carried the pain signals to the spinal cord and the signals eventually reach the brain, where the pain is noticed  If the nerves were "blocked" or "numbed", they will not be able to carry pain sensation to the spinal cord for the short term  Therefore, if the pain is due to facet joint arthritis, you should have relief from pain and stiffness  This is a diagnostic procedure  You will be given a pain diary to take home for the next 24 hours  If this does provide short-term relief, this procedure may be repeated to confirm diagnosis  Once it is determined that the pain is indeed due to the facet joint disease, we can use a procedure called radiofrequency ablation and prevent the conduction of pain information for an extended period of time as discussed by your physician  What happens during the procedure? Lying on your stomach or side, the skin over year neck, midback or low back will be well cleaned  The physician will numb a small area of skin with numbing medicine which may sting for a few seconds  The physician will use x-ray guidance to direct a special needle along side the targeted medial branch nerves  Then a small amount of contrast dye may be injected confirming proper placement    Then a nerve small amount of local anesthetic will be injected over the nerve  What happens after the procedure? Your arm or chest wall or leg may temporarily feel numb or weak from the anesthetic  A dressing may be applied to the injection site  Your will remain for about 15 to 20 minutes and the nurse will monitor blood pressure and pulse  The nurse will review your discharge instructions and will be able to go home  A pain diary will be provided for you to keep a record over the next 24 hours  Over that time, you should perform activities that normally cause you pain (within reason)  This will help us determine the success of the injection  After you have finished with the diary, please mail, fax or drop off the completed diary and we will be in contact with you  Gabapentin (By mouth)   Gabapentin (bert-a-PEN-tin)  Treats seizures and pain caused by shingles  Brand Name(s): FusePaq Fanatrex, Neurontin   There may be other brand names for this medicine  When This Medicine Should Not Be Used: This medicine is not right for everyone  Do not use it if you had an allergic reaction to gabapentin  How to Use This Medicine:   Capsule, Liquid, Tablet  Take your medicine as directed  Your dose may need to be changed several times to find what works best for you  If you have epilepsy, do not allow more than 12 hours to pass between doses  Capsule: Swallow the capsule whole with plenty of water  Do not open, crush, or chew it  Gralise® tablet: Swallow the tablet whole   Do not crush, break, or chew it  Neurontin® tablet: If you break a tablet into 2 pieces, use the second half as your next dose  Do not use the half-tablet if the whole tablet has been cut or broken after 28 days  Oral liquid: Measure the oral liquid medicine with a marked measuring spoon, oral syringe, or medicine cup  This medicine should come with a Medication Guide  Ask your pharmacist for a copy if you do not have one  Missed dose:  Take a dose as soon as you remember  If it is almost time for your next dose, wait until then and take a regular dose  Do not take extra medicine to make up for a missed dose  Store the medicine in a closed container at room temperature, away from heat, moisture, and direct light  Store the Neurontin® oral liquid in the refrigerator  Do not freeze  Drugs and Foods to Avoid:   Ask your doctor or pharmacist before using any other medicine, including over-the-counter medicines, vitamins, and herbal products  Some medicines can affect how gabapentin works  Tell your doctor if you also using hydrocodone or morphine  If you take an antacid, wait at least 2 hours before you take gabapentin  Do not drink alcohol while you are using this medicine  Tell your doctor if you use anything else that makes you sleepy  Some examples are allergy medicine, narcotic pain medicine, and alcohol  Tell your doctor if you are also using lorazepam, oxycodone, or zolpidem  Warnings While Using This Medicine:   Tell your doctor if you are pregnant or breastfeeding, or if you have kidney problems (including patients receiving dialysis) or lung problems  Tell your doctor if you have a history of depression or mental health problems  This medicine may cause the following problems:  Drug reaction with eosinophilia and systemic symptoms (DRESS) or multiorgan hypersensitivity, which may damage the liver, kidney, blood, heart, or muscles  Changes in mood or behavior, including suicidal thoughts or behavior  Respiratory depression (serious breathing problem that can be life-threatening), when used with narcotic pain medicines  Do not stop using this medicine suddenly  Your doctor will need to slowly decrease your dose before you stop it completely  This medicine may make you dizzy or drowsy  Do not drive or do anything else that could be dangerous until you know how this medicine affects you    Tell any doctor or dentist who treats you that you are using this medicine  This medicine may affect certain medical test results  Your doctor will check your progress and the effects of this medicine at regular visits  Keep all appointments  Keep all medicine out of the reach of children  Never share your medicine with anyone  Possible Side Effects While Using This Medicine:   Call your doctor right away if you notice any of these side effects: Allergic reaction: Itching or hives, swelling in your face or hands, swelling or tingling in your mouth or throat, chest tightness, trouble breathing  Behavior problems, aggression, restlessness, trouble concentrating, moodiness (especially in children)  Blistering, peeling, red skin rash  Blue lips, fingernails, or skin, chest pain, fast heartbeat, trouble breathing  Change in how much or how often you urinate, bloody or cloudy urine  Dark urine or pale stools, nausea, vomiting, loss of appetite, stomach pain, yellow skin or eyes  Fever, chills, cough, sore throat, body aches  Problems with coordination, shakiness, unsteadiness, unusual eye movement  Rapid weight gain, swelling in your hands, ankles, or feet  Rash, swollen or tender glands in the neck, armpit, or groin  Unusual moods or behaviors, thoughts of hurting yourself, feeling depressed  If you notice these less serious side effects, talk with your doctor:   Dizziness, drowsiness, sleepiness, tiredness  If you notice other side effects that you think are caused by this medicine, tell your doctor  Call your doctor for medical advice about side effects  You may report side effects to FDA at 7-820-FDA-3168    © Copyright [employer] 2022 Information is for End User's use only and may not be sold, redistributed or otherwise used for commercial purposes  The above information is an  only  It is not intended as medical advice for individual conditions or treatments   Talk to your doctor, nurse or pharmacist before following any medical regimen to see if it is safe and effective for you  Cyclobenzaprine (By mouth)   Cyclobenzaprine ([id number]-[id number]-KLH-za-preen)  Treats pain and stiffness caused by muscle spasms  Brand Name(s): Amrix, CycloTENS Refill Ruiz, CycloTENS Starter Ruiz, Fexmid, FusePaq Margie, RapidPaq Tabradol   There may be other brand names for this medicine  When This Medicine Should Not Be Used: This medicine is not right for everyone  Do not use it if you had an allergic reaction to cyclobenzaprine  How to Use This Medicine:   Long Acting Capsule, Liquid, Tablet  Your doctor will tell you how much medicine to use  Do not use more than directed  Take this medicine at the same time each day  Swallow the extended-release capsule whole  Do not crush, break, or chew it  If you cannot swallow the capsule whole, you may open the capsule and sprinkle the contents over one tablespoon of applesauce  Swallow the mixture right away without chewing  Rinse the mouth to make sure all of the medicine have been swallowed  Do not save any of the mixture to use later  This medicine is not for long-term use  Missed dose: Take a dose as soon as you remember  If it is almost time for your next dose, wait until then and take a regular dose  Do not take extra medicine to make up for a missed dose  Store the medicine in a closed container at room temperature, away from heat, moisture, and direct light  Drugs and Foods to Avoid:   Ask your doctor or pharmacist before using any other medicine, including over-the-counter medicines, vitamins, and herbal products  Do not use this medicine if you have used an MAO inhibitor (MAOI) within 14 days of each other  Some foods and medicines can affect how this medicine works  Tell your doctor if you are using any of the following:   Bupropion, guanethidine, meperidine, tramadol, verapamil  Medicine to treat depression (including amitriptyline, imipramine)  Do not drink alcohol while you are using this medicine    Tell your doctor if you use anything else that makes you sleepy  Some examples are allergy medicine, narcotic pain medicine, and alcohol  Warnings While Using This Medicine:   Tell your doctor if you are pregnant or breastfeeding, or if you have liver problems, congestive heart failure, heart rhythm problems, a recent heart attack, overactive thyroid, or a history of glaucoma or trouble urinating  This medicine may cause the following problems:  Serotonin syndrome, when taken with certain medicines  This medicine may make you dizzy or drowsy  Do not drive or doing anything that could be dangerous until you know how this medicine affects you  Call your doctor if your symptoms do not improve or if they get worse  Keep all medicine out of the reach of children  Never share your medicine with anyone  Possible Side Effects While Using This Medicine:   Call your doctor right away if you notice any of these side effects: Allergic reaction: Itching or hives, swelling in your face or hands, swelling or tingling in your mouth or throat, chest tightness, trouble breathing  Anxiety, restlessness, fever, sweating, twitching, nausea, vomiting, diarrhea, seeing or hearing things that are not there  Fast, pounding, or uneven heartbeat  Severe drowsiness, fainting, or confusion  If you notice these less serious side effects, talk with your doctor:   Dizziness  Dry mouth  If you notice other side effects that you think are caused by this medicine, tell your doctor  Call your doctor for medical advice about side effects  You may report side effects to FDA at 8-299-FDA-4811    © Copyright [employer] 2022 Information is for End User's use only and may not be sold, redistributed or otherwise used for commercial purposes  The above information is an  only  It is not intended as medical advice for individual conditions or treatments   Talk to your doctor, nurse or pharmacist before following any medical regimen to see if it is safe and effective for you

## 2023-02-02 ENCOUNTER — TELEPHONE (OUTPATIENT)
Dept: PAIN MEDICINE | Facility: CLINIC | Age: 63
End: 2023-02-02

## 2023-02-02 NOTE — TELEPHONE ENCOUNTER
Left message for the patient to call to reschedule his injection  Gave my direct phone number [phone]  Also to give him the phone number of [facility] ([phone]) for him to talk to set up the transportation prior to the procedures   Or he could call his insurance to see if they have a company for him

## 2023-02-02 NOTE — TELEPHONE ENCOUNTER
Schedule patient for 3/29/23 & 4/12/23  No as needed pain meds for 6 hrs before and 6/8 hrs after procedure  Pain level must be 5 or greater  Need to arrange transportation  Proper clothing for procedure  If ill or placed on antibiotics please call to reschedule

## 2023-02-03 ENCOUNTER — TELEPHONE (OUTPATIENT)
Dept: GASTROENTEROLOGY | Facility: CLINIC | Age: 63
End: 2023-02-03

## 2023-02-03 ENCOUNTER — TELEPHONE (OUTPATIENT)
Dept: OTHER | Facility: OTHER | Age: 63
End: 2023-02-03

## 2023-02-03 ENCOUNTER — TELEMEDICINE (OUTPATIENT)
Dept: GASTROENTEROLOGY | Facility: CLINIC | Age: 63
End: 2023-02-03

## 2023-02-03 DIAGNOSIS — R19.4 CHANGE IN BOWEL HABIT: ICD-10-CM

## 2023-02-03 DIAGNOSIS — E11.43 GASTROPARESIS DUE TO DM (HCC): ICD-10-CM

## 2023-02-03 DIAGNOSIS — K21.9 GASTROESOPHAGEAL REFLUX DISEASE WITHOUT ESOPHAGITIS: Primary | ICD-10-CM

## 2023-02-03 DIAGNOSIS — K31.84 GASTROPARESIS DUE TO DM (HCC): ICD-10-CM

## 2023-02-03 NOTE — TELEPHONE ENCOUNTER
Patient is retuning a phone call  He have an Virtual  Appointment with Dr Lily Gimenez  for today at 1:20pm and he think he missed the call  Please contact patient
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
## 2023-02-06 NOTE — TELEPHONE ENCOUNTER
Scheduled date of EGD(as of today): 2/22/23  Physician performing EGD: Dr Briseyda Yao  Location of EGD: [facility]  Instructions reviewed with patient by: [email or web address]  Clearances: cardiac and Eliquis clearance needed from Dr Jenn Arauz  Sent through system

## 2023-02-06 NOTE — TELEPHONE ENCOUNTER
Dr Acosta Sites patient is scheduled for procedure: EGD at [facility]  Please advise if cleared and how long may hold his Eliquis  Thank you so much! On: __2__/_22    _/_23    _      With:   ___Hermes______    He/She is taking the following blood thinner:   Eliquis       Can this be stopped __1____ days prior to the procedure?       Physician Approving clearance: ________________________

## 2023-02-07 ENCOUNTER — TELEMEDICINE (OUTPATIENT)
Dept: CARDIOLOGY CLINIC | Facility: CLINIC | Age: 63
End: 2023-02-07

## 2023-02-07 VITALS
HEIGHT: 71 IN | OXYGEN SATURATION: 97 % | WEIGHT: 252 LBS | SYSTOLIC BLOOD PRESSURE: 148 MMHG | BODY MASS INDEX: 35.28 KG/M2 | DIASTOLIC BLOOD PRESSURE: 70 MMHG

## 2023-02-07 DIAGNOSIS — I25.10 CORONARY ARTERY DISEASE INVOLVING NATIVE HEART WITHOUT ANGINA PECTORIS, UNSPECIFIED VESSEL OR LESION TYPE: Primary | ICD-10-CM

## 2023-02-07 DIAGNOSIS — E78.2 MIXED HYPERLIPIDEMIA: ICD-10-CM

## 2023-02-07 DIAGNOSIS — I48.20 CHRONIC A-FIB (HCC): ICD-10-CM

## 2023-02-07 DIAGNOSIS — I50.32 CHRONIC DIASTOLIC (CONGESTIVE) HEART FAILURE (HCC): ICD-10-CM
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
## 2023-02-10 NOTE — ASSESSMENT & PLAN NOTE
Addended by: CATERINA ORLANDO on: 2/10/2023 09:30 AM     Modules accepted: Odin, Funmi     Continue buspar, seroquel, Chandni Serge

## 2023-02-13 ENCOUNTER — TELEPHONE (OUTPATIENT)
Dept: CARDIOLOGY CLINIC | Facility: CLINIC | Age: 63
End: 2023-02-13

## 2023-02-13 NOTE — TELEPHONE ENCOUNTER
Keily Patton from GI called inquiring about cardiac clearance note for patient  Office note did not indicate any clearance   Please assist

## 2023-02-14 NOTE — TELEPHONE ENCOUNTER
Thank you! I lmom informing pt of two day eliamara hold per Dr Guillaume Argueta sked pt to please call back to let me know that he received the message  Will call pt again in a few days if do not hear back from him

## 2023-02-16 DIAGNOSIS — I50.22 CHRONIC SYSTOLIC HEART FAILURE (HCC): ICD-10-CM

## 2023-02-16 DIAGNOSIS — I25.10 CORONARY ARTERY DISEASE INVOLVING NATIVE HEART WITHOUT ANGINA PECTORIS, UNSPECIFIED VESSEL OR LESION TYPE: ICD-10-CM

## 2023-02-16 DIAGNOSIS — F99 PSYCHIATRIC DISORDER: ICD-10-CM

## 2023-02-16 RX ORDER — METOPROLOL SUCCINATE 200 MG/1
TABLET, EXTENDED RELEASE ORAL
Qty: 90 TABLET | Refills: 1 | Status: SHIPPED | OUTPATIENT
Start: 2023-02-16

## 2023-02-16 RX ORDER — BUSPIRONE HYDROCHLORIDE 10 MG/1
TABLET ORAL
Qty: 60 TABLET | Refills: 0 | Status: SHIPPED | OUTPATIENT
Start: 2023-02-16

## 2023-02-16 RX ORDER — LOSARTAN POTASSIUM 50 MG/1
TABLET ORAL
Qty: 90 TABLET | Refills: 0 | Status: SHIPPED | OUTPATIENT
Start: 2023-02-16

## 2023-02-17 ENCOUNTER — TELEPHONE (OUTPATIENT)
Dept: PULMONOLOGY | Facility: MEDICAL CENTER | Age: 63
End: 2023-02-17

## 2023-02-17 LAB
DME PARACHUTE DELIVERY DATE REQUESTED: NORMAL
DME PARACHUTE ITEM DESCRIPTION: NORMAL
DME PARACHUTE ITEM DESCRIPTION: NORMAL
DME PARACHUTE ORDER STATUS: NORMAL
DME PARACHUTE SUPPLIER NAME: NORMAL
DME PARACHUTE SUPPLIER PHONE: NORMAL

## 2023-02-17 NOTE — TELEPHONE ENCOUNTER
Patent left VM that he is returning [facility] call  Please call back at [phone]

## 2023-02-17 NOTE — TELEPHONE ENCOUNTER
Received a call from Lambert Elena from [facility] stating patient would like to receive his  nebulizer medication from [facility]   [facility]

## 2023-02-17 NOTE — PROGRESS NOTES
Albuterol (2 5 mg/3 mL) 0 083 % nebulizer solution submitted to [place] [facility] through [facility] per pt request

## 2023-02-22 DIAGNOSIS — E11.65 UNCONTROLLED TYPE 2 DIABETES MELLITUS WITH HYPERGLYCEMIA (HCC): ICD-10-CM

## 2023-02-22 RX ORDER — LIRAGLUTIDE 6 MG/ML
INJECTION SUBCUTANEOUS
Qty: 9 ML | Refills: 0 | Status: SHIPPED | OUTPATIENT
Start: 2023-02-22

## 2023-03-09 DIAGNOSIS — G47.33 OSA AND COPD OVERLAP SYNDROME (HCC): Primary | ICD-10-CM

## 2023-03-09 DIAGNOSIS — J44.9 OSA AND COPD OVERLAP SYNDROME (HCC): Primary | ICD-10-CM

## 2023-03-10 ENCOUNTER — TELEPHONE (OUTPATIENT)
Dept: FAMILY MEDICINE CLINIC | Facility: CLINIC | Age: 63
End: 2023-03-10

## 2023-03-10 NOTE — TELEPHONE ENCOUNTER
Left message at his office asking for consults and results tc/cma----- Message from Leobardo Clemons MD sent at 3/10/2023 12:39 AM EST -----  Please call Dr Leandro Mix office for consults/testing on patient--thanks

## 2023-03-13 ENCOUNTER — TELEPHONE (OUTPATIENT)
Dept: FAMILY MEDICINE CLINIC | Facility: CLINIC | Age: 63
End: 2023-03-13

## 2023-03-13 NOTE — TELEPHONE ENCOUNTER
Called Dr James Desouza office   And spoke with [facility]  She advised patient has not been seen since 2012 and they do not have future appointment scheduled for him  She advised that they shred records after 7 years as they do paper charts  Problem: CARDIOVASCULAR - ADULT  Goal: Maintains optimal cardiac output and hemodynamic stability  Description: INTERVENTIONS:  - Monitor vital signs, rhythm, and trends  - Monitor for bleeding, hypotension and signs of decreased cardiac output  Eloy Gomez ambulating w/ or w/o assistive devices  - Assist with transfers and ambulation using safe patient handling equipment as needed  - Ensure adequate protection for wounds/incisions during mobilization  - Obtain PT/OT consults as needed  - Advance activity as

## 2023-03-13 NOTE — TELEPHONE ENCOUNTER
----- Message from Richard Anne MD sent at 3/10/2023 12:39 AM EST -----  Please call Dr Roopa Cox office for consults/testing on patient--thanks

## 2023-03-13 NOTE — TELEPHONE ENCOUNTER
Faxed release of records to Dr Silvia Reaves office tc/cma----- Message from Da Mejia MD sent at 3/10/2023 12:39 AM EST -----  Please call Dr Johanna Piña office for consults/testing on patient--thanks

## 2023-03-14 ENCOUNTER — IN HOME VISIT (OUTPATIENT)
Dept: FAMILY MEDICINE CLINIC | Facility: CLINIC | Age: 63
End: 2023-03-14

## 2023-03-14 VITALS
TEMPERATURE: 97.2 F | RESPIRATION RATE: 2 BRPM | DIASTOLIC BLOOD PRESSURE: 60 MMHG | HEART RATE: 64 BPM | SYSTOLIC BLOOD PRESSURE: 110 MMHG | BODY MASS INDEX: 34.59 KG/M2 | WEIGHT: 248 LBS

## 2023-03-14 DIAGNOSIS — I10 BENIGN ESSENTIAL HYPERTENSION: ICD-10-CM

## 2023-03-14 DIAGNOSIS — C91.11 CHRONIC LYMPHOCYTIC LEUKEMIA OF B-CELL TYPE IN REMISSION (HCC): ICD-10-CM

## 2023-03-14 DIAGNOSIS — E87.8 ELECTROLYTE ABNORMALITY: ICD-10-CM

## 2023-03-14 DIAGNOSIS — F31.9 BIPOLAR AFFECTIVE DISORDER, REMISSION STATUS UNSPECIFIED (HCC): ICD-10-CM

## 2023-03-14 DIAGNOSIS — J44.9 CHRONIC OBSTRUCTIVE PULMONARY DISEASE, UNSPECIFIED COPD TYPE (HCC): ICD-10-CM

## 2023-03-14 DIAGNOSIS — E11.8 TYPE 2 DIABETES MELLITUS WITH COMPLICATION, WITH LONG-TERM CURRENT USE OF INSULIN (HCC): ICD-10-CM

## 2023-03-14 DIAGNOSIS — M54.50 CHRONIC BILATERAL LOW BACK PAIN WITHOUT SCIATICA: ICD-10-CM

## 2023-03-14 DIAGNOSIS — I48.20 CHRONIC A-FIB (HCC): ICD-10-CM

## 2023-03-14 DIAGNOSIS — Z79.4 TYPE 2 DIABETES MELLITUS WITH COMPLICATION, WITH LONG-TERM CURRENT USE OF INSULIN (HCC): ICD-10-CM

## 2023-03-14 DIAGNOSIS — I50.33 ACUTE ON CHRONIC DIASTOLIC CONGESTIVE HEART FAILURE (HCC): ICD-10-CM

## 2023-03-14 DIAGNOSIS — I48.91 ATRIAL FIBRILLATION WITH RVR (HCC): ICD-10-CM

## 2023-03-14 DIAGNOSIS — G47.33 OBSTRUCTIVE SLEEP APNEA: ICD-10-CM

## 2023-03-14 DIAGNOSIS — E11.65 UNCONTROLLED TYPE 2 DIABETES MELLITUS WITH HYPERGLYCEMIA (HCC): Primary | ICD-10-CM

## 2023-03-14 DIAGNOSIS — G89.29 CHRONIC BILATERAL LOW BACK PAIN WITHOUT SCIATICA: ICD-10-CM

## 2023-03-14 PROBLEM — C91.10 CHRONIC LYMPHOCYTIC LEUKEMIA OF B-CELL TYPE NOT HAVING ACHIEVED REMISSION (HCC): Status: RESOLVED | Noted: 2022-04-28 | Resolved: 2023-03-14

## 2023-03-14 PROBLEM — E87.1 HYPO-OSMOLALITY AND HYPONATREMIA: Status: ACTIVE | Noted: 2022-05-02

## 2023-03-14 PROBLEM — E11.40 TYPE 2 DIABETES MELLITUS WITH DIABETIC NEUROPATHY, UNSPECIFIED (HCC): Status: ACTIVE | Noted: 2021-11-28

## 2023-03-14 PROBLEM — C91.10 CLL (CHRONIC LYMPHOCYTIC LEUKEMIA) (HCC): Status: RESOLVED | Noted: 2022-06-28 | Resolved: 2023-03-14

## 2023-03-14 PROBLEM — K21.9 GASTRO-ESOPHAGEAL REFLUX DISEASE WITHOUT ESOPHAGITIS: Status: ACTIVE | Noted: 2021-11-28

## 2023-03-14 PROBLEM — D84.9 IMMUNODEFICIENCY, UNSPECIFIED (HCC): Status: ACTIVE | Noted: 2022-09-29

## 2023-03-14 PROBLEM — J96.10 CHRONIC RESPIRATORY FAILURE (HCC): Status: RESOLVED | Noted: 2018-10-31 | Resolved: 2023-03-14

## 2023-03-14 PROBLEM — M51.369 OTHER INTERVERTEBRAL DISC DEGENERATION, LUMBAR REGION: Status: ACTIVE | Noted: 2021-11-29

## 2023-03-14 PROBLEM — E66.9 OBESITY (BMI 30-39.9): Status: RESOLVED | Noted: 2020-02-02 | Resolved: 2023-03-14

## 2023-03-14 PROBLEM — M51.36 OTHER INTERVERTEBRAL DISC DEGENERATION, LUMBAR REGION: Status: ACTIVE | Noted: 2021-11-29

## 2023-03-14 PROBLEM — K21.9 GASTRO-ESOPHAGEAL REFLUX DISEASE WITHOUT ESOPHAGITIS: Status: RESOLVED | Noted: 2021-11-28 | Resolved: 2023-03-14

## 2023-03-14 PROBLEM — C91.10 CHRONIC LYMPHOCYTIC LEUKEMIA OF B-CELL TYPE NOT HAVING ACHIEVED REMISSION (HCC): Status: ACTIVE | Noted: 2022-04-28

## 2023-03-14 PROBLEM — C95.90 LEUKEMIA (HCC): Status: RESOLVED | Noted: 2022-05-01 | Resolved: 2023-03-14

## 2023-03-14 PROBLEM — E11.40 TYPE 2 DIABETES MELLITUS WITH DIABETIC NEUROPATHY, UNSPECIFIED (HCC): Status: RESOLVED | Noted: 2021-11-28 | Resolved: 2023-03-14

## 2023-03-14 PROBLEM — N18.2 STAGE 2 CHRONIC KIDNEY DISEASE: Status: RESOLVED | Noted: 2020-02-02 | Resolved: 2023-03-14

## 2023-03-14 PROBLEM — N18.2 CHRONIC KIDNEY DISEASE, STAGE 2 (MILD): Status: ACTIVE | Noted: 2021-11-28

## 2023-03-14 PROBLEM — I25.10 ATHEROSCLEROTIC HEART DISEASE OF NATIVE CORONARY ARTERY WITHOUT ANGINA PECTORIS: Status: ACTIVE | Noted: 2021-11-28

## 2023-03-14 PROBLEM — J96.11 CHRONIC RESPIRATORY FAILURE WITH HYPOXIA (HCC): Status: ACTIVE | Noted: 2021-11-28

## 2023-03-14 PROBLEM — I48.0 PAROXYSMAL ATRIAL FIBRILLATION (HCC): Status: ACTIVE | Noted: 2021-11-28

## 2023-03-14 PROBLEM — E78.5 DYSLIPIDEMIA: Status: RESOLVED | Noted: 2019-09-06 | Resolved: 2023-03-14

## 2023-03-14 RX ORDER — INSULIN ASPART 100 [IU]/ML
35 INJECTION, SOLUTION INTRAVENOUS; SUBCUTANEOUS
Qty: 15 ML | Refills: 1
Start: 2023-03-14 | End: 2023-04-13

## 2023-03-14 RX ORDER — QUETIAPINE FUMARATE 100 MG/1
TABLET, FILM COATED ORAL
COMMUNITY
Start: 2023-03-07

## 2023-03-14 RX ORDER — POTASSIUM CHLORIDE 20 MEQ/1
20 TABLET, EXTENDED RELEASE ORAL DAILY
Qty: 30 TABLET | Refills: 6 | Status: SHIPPED | OUTPATIENT
Start: 2023-03-14 | End: 2023-04-13

## 2023-03-14 NOTE — PROGRESS NOTES
Name: Parish Limon      : 1960      MRN: [id number]  Encounter Provider: MANAS Porter  Encounter Date: 3/14/2023   Encounter department: [facility]     1  Uncontrolled type 2 diabetes mellitus with hyperglycemia ([facility])  Diabetic management plan in place with patient    2  Chronic lymphocytic leukemia of B-cell type in remission ([place] [address] )  No treatment at present    3  Chronic obstructive pulmonary disease, unspecified COPD type ([place] [address] )  02 @ 3L prn    4  Obstructive sleep apnea  BiPap at night    5  Essential hypertension  Med management-stable    6  Chronic a-fib ([place] [address] )  Med management-stable    7  Acute on chronic diastolic congestive heart failure (HCC)  No LE edema or crackles  Med management-stable    8  Chronic bilateral low back pain without sciatica  Due for block end of month    9  Bipolar affective disorder, remission status unspecified ([place] [address] )  Multiple psych med management    10  Atrial fibrillation with RVR ([facility])  -     apixaban (Eliquis) 5 mg; Take 1 tablet (5 mg total) by mouth 2 (two) times a day    11  Electrolyte abnormality  -     potassium chloride (K-DUR,KLOR-CON) 20 mEq tablet; Take 1 tablet (20 mEq total) by mouth daily           Subjective      Patient is new to practice-seen today at home  First assessment in over 6 months in person except for office visit to pain management in 2023  Patient has been seen via telemedicine and frequent ER visits  He has CLL and COPD as well as weakness and pain which make him homebound  He is due for a Medial branch block by pain medicine on 3/29/23  Alonzo's major medical issue at this time is diabetic management and blood sugar control  He had an A1C over 11 in January  He wears a free style kamran  His average weekly blood glucose numbers are over 250  I educated him and we agreed on a plan to manage his sugars based on his sleeping and eating times    His low bllod sugar number is 120 whereas he had it in his mind at 180 and was holding his inulin's below this number  We agreed on the 120 as he stated he feels hypoglycemic symptoms below this number  He is going to stop the sliding scale and take the 35 units of novolog tid with meals and 65 units of lantus at night which he will cut to 30 units if his blood glucose is under 120  He will call in weekly with his weekly averages and we will monitor numbers from there  His medications box for lantus prescription stated 76 units bid and he stated he has never taken that amount  Alonzo does not receive treatment for CLL  He has lost 50# in a year which is desirable  His medications were reconciled  >60 minutes was spent with him on detailed history, assessment, exam and decision making and education  Review of Systems   Constitutional: Negative  HENT: Negative  Eyes: Negative  Respiratory: Negative  Cardiovascular: Negative  Gastrointestinal: Negative  Endocrine: Negative  Genitourinary: Negative  Musculoskeletal: Positive for myalgias  Skin: Negative  Allergic/Immunologic: Negative  Neurological: Negative  Hematological: Negative  Psychiatric/Behavioral: Negative          Current Outpatient Medications on File Prior to Visit   Medication Sig   • acetaminophen (TYLENOL) 325 mg tablet Take 2 tablets (650 mg total) by mouth every 6 (six) hours as needed for mild pain, headaches or fever   • albuterol (2 5 mg/3 mL) 0 083 % nebulizer solution Take 1 vial (2 5 mg total) by nebulization every 6 (six) hours as needed for wheezing   • Alcohol Swabs (Alcohol Prep) 70 % PADS AS DIRECTED   • Ascorbic Acid (VITAMIN C) 1000 MG tablet Take 1,000 mg by mouth daily   • aspirin 81 MG tablet Take 81 mg by mouth every morning     • atorvastatin (LIPITOR) 80 mg tablet TAKE ONE TABLET BY MOUTH DAILY AT NIGHT   • B-D ULTRAFINE III SHORT PEN 31G X 8 MM MISC Use as directed   • busPIRone (BUSPAR) 10 mg tablet TAKE ONE TABLET BY MOUTH TWICE DAILY   • cholecalciferol (VITAMIN D3) 1,000 units tablet Take 1 tablet (1,000 Units total) by mouth daily   • Treichlers Choice Comfort EZ 33G X 4 MM MISC USE TO INJECT INSULIN 5 TIMES A DAY   • Continuous Blood Gluc Sensor (FreeStyle Sheba 14 Day Sensor) MISC Use 1 application every 14 (fourteen) days   • Continuous Blood Gluc Sensor (FreeStyle Sheba 14 Day Sensor) MISC Use as directed-   • COVID-19 At Home Test (FLOWFLEX COVID-19 AG HOME TEST VI)    • digoxin (LANOXIN) 0 25 mg tablet Take 1 tablet (250 mcg total) by mouth daily   • fluticasone-umeclidinium-vilanterol (TRELEGY) 100-62 5-25 MCG/INH inhaler Inhale 1 puff daily Rinse mouth after use  • gabapentin (Neurontin) 600 MG tablet Take 1 tablet (600 mg total) by mouth 3 (three) times a day   • guaiFENesin 1200 MG TB12 Take 1 tablet (1,200 mg total) by mouth 2 (two) times a day   • Icosapent Ethyl (Vascepa) 1 g CAPS Take 2 capsules (2 g total) by mouth 2 (two) times a day   • Insulin Glargine Solostar 100 UNIT/ML SOPN Inject under the skin 65 units 2x a day   • Insulin Pen Needle (Treichlers Choice Comfort EZ) 33G X 4 MM MISC USE TO INJECT INSULIN 5 TIMES A DAY   • lamoTRIgine (LaMICtal) 25 mg tablet 25 mg daily at bedtime   • Lancet Devices (Adjustable Lancing Device) MISC USE AS DIRECTED   • Lancets (onetouch ultrasoft) lancets test blood sugar 3 (three) times a day   • [START ON 3/27/2023] LORazepam (Ativan) 0 5 mg tablet Patient to use 1 tablet 60 minutes prior to procedure time may take second tablet if necessary for procedure on 3/29/2023 and then again on 4/12/2023  Do not start before March 27, 2023     • losartan (COZAAR) 50 mg tablet TAKE ONE TABLET BY MOUTH DAILY   • metFORMIN (GLUCOPHAGE-XR) 500 mg 24 hr tablet TAKE ONE TABLET BY MOUTH DAILY   • metoprolol succinate (TOPROL-XL) 200 MG 24 hr tablet TAKE 1 TABLET DAILY   • Multiple Vitamins-Minerals (CENTRUM SILVER 50+MEN PO) Take by mouth   • omeprazole (PriLOSEC) 20 mg delayed release capsule TAKE ONE CAPSULE BY MOUTH DAILY EVERY MORNING   • QUEtiapine (SEROquel XR) 50 mg Take 2 tablets (100 mg total) by mouth every morning   • QUEtiapine (SEROquel) 100 mg tablet    • QUEtiapine (SEROquel) 300 mg tablet Take 1 tablet (300 mg total) by mouth daily at bedtime   • sertraline (ZOLOFT) 50 mg tablet Take 1 tablet (50 mg total) by mouth daily Daily at bedtime   • Unifine SafeControl Pen Needle 30G X 5 MM MISC    • Victoza injection INJECT 0 3ML (1 8 ML BY MOUTHTOTAL) UNDER THE SKIN EVERY MORNING   • [DISCONTINUED] apixaban (Eliquis) 5 mg Take 1 tablet (5 mg total) by mouth 2 (two) times a day   • [DISCONTINUED] cyclobenzaprine (FLEXERIL) 10 mg tablet Take 1 tablet (10 mg total) by mouth 3 (three) times a day as needed for muscle spasms   • [DISCONTINUED] NovoLOG FlexPen 100 units/mL injection pen INJECT 35 UNITS UNDER THE SKIN THREE TIMES DAILY WITH MEALS PER SLIDING SCALE   • [DISCONTINUED] potassium chloride (K-DUR,KLOR-CON) 20 mEq tablet TAKE ONE TABLET BY MOUTH DAILY       Objective     /60   Pulse 64   Temp (!) 97 2 °F (36 2 °C)   Resp (!) 2   Wt 112 kg (248 lb)   SpO2 (!) 0%   BMI 34 59 kg/m²     Physical Exam  Constitutional:       General: He is not in acute distress  Appearance: Normal appearance  He is obese  He is not ill-appearing, toxic-appearing or diaphoretic  HENT:      Head: Normocephalic and atraumatic  Right Ear: External ear normal       Left Ear: External ear normal       Nose: Nose normal  No congestion  Mouth/Throat:      Mouth: Mucous membranes are moist    Eyes:      General:         Right eye: No discharge  Left eye: Discharge present  Conjunctiva/sclera: Conjunctivae normal       Comments: Clear drainage left eye   Cardiovascular:      Rate and Rhythm: Normal rate and regular rhythm  Pulses: no weak pulses     Heart sounds: Normal heart sounds  No murmur heard  Pulmonary:      Effort: Pulmonary effort is normal  No respiratory distress  Breath sounds: Normal breath sounds  No wheezing, rhonchi or rales  Abdominal:      General: Bowel sounds are normal       Palpations: Abdomen is soft  There is no mass  Tenderness: There is no guarding  Musculoskeletal:         General: Normal range of motion  Cervical back: Normal range of motion  No rigidity  Right lower leg: No edema  Left lower leg: No edema  Feet:      Right foot:      Skin integrity: No ulcer, skin breakdown, erythema, warmth, callus or dry skin  Left foot:      Skin integrity: No ulcer, skin breakdown, erythema, warmth, callus or dry skin  Skin:     General: Skin is warm and dry  Findings: No lesion or rash  Neurological:      General: No focal deficit present  Mental Status: He is alert and oriented to person, place, and time  Motor: Weakness present  Coordination: Coordination normal       Gait: Gait normal       Deep Tendon Reflexes: Reflexes normal    Psychiatric:         Mood and Affect: Mood normal          Behavior: Behavior normal          Thought Content: Thought content normal          Judgment: Judgment normal       Comments: Tires easily-treated for bipolar disorder via telemed by psych       Nevaeh Payneabetic Foot Exam    Patient's shoes and socks removed  Right Foot/Ankle   Right Foot Inspection  Skin Exam: skin normal and skin intact  No dry skin, no warmth, no callus, no erythema, no maceration, no abnormal color, no pre-ulcer, no ulcer and no callus  Toe Exam: ROM and strength within normal limits  Left Foot/Ankle  Left Foot Inspection  Skin Exam: skin normal and skin intact  No dry skin, no warmth, no erythema, no maceration, normal color, no pre-ulcer, no ulcer and no callus  Toe Exam: ROM and strength within normal limits       Assign Risk Category  No deformity present  No loss of protective sensation  No weak pulses  Risk: 0

## 2023-03-17 LAB
DME PARACHUTE DELIVERY DATE ACTUAL: NORMAL
DME PARACHUTE DELIVERY DATE REQUESTED: NORMAL
DME PARACHUTE ITEM DESCRIPTION: NORMAL
DME PARACHUTE ORDER STATUS: NORMAL
DME PARACHUTE SUPPLIER NAME: NORMAL
DME PARACHUTE SUPPLIER PHONE: NORMAL

## 2023-03-18 PROBLEM — Z00.00 MEDICARE ANNUAL WELLNESS VISIT, SUBSEQUENT: Status: RESOLVED | Noted: 2021-02-28 | Resolved: 2023-03-18

## 2023-03-20 ENCOUNTER — TELEPHONE (OUTPATIENT)
Dept: PAIN MEDICINE | Facility: CLINIC | Age: 63
End: 2023-03-20

## 2023-03-20 DIAGNOSIS — M79.18 MYOFASCIAL PAIN SYNDROME: Primary | ICD-10-CM

## 2023-03-20 DIAGNOSIS — M54.16 LUMBAR RADICULOPATHY: ICD-10-CM

## 2023-03-20 NOTE — TELEPHONE ENCOUNTER
Caller: Alonzo    Doctor: [address] [place] [address]     Reason for call: pharmacy told him we cancelled refill on Cyclobenzaprine  10 mg     Please advise     Call back#: [phone]

## 2023-03-20 NOTE — TELEPHONE ENCOUNTER
The medication was discontinued by MANAS Payne     II think she works at the nursing home  Cyclobenzaprine is contraindicated with multiple heart conditions including A-fib and congestive heart failure as well as chronic kidney disease  This is likely why she discontinued the medication  Has the patient had relief using methocarbamol or chlorzoxazone or tizanidine?

## 2023-03-21 RX ORDER — TIZANIDINE 4 MG/1
4 TABLET ORAL EVERY 8 HOURS PRN
Qty: 60 TABLET | Refills: 0 | Status: SHIPPED | OUTPATIENT
Start: 2023-03-21

## 2023-03-21 NOTE — TELEPHONE ENCOUNTER
I am sending in tizanidine 4 mg tablet patient may take 1 tablet every 8 hours as needed for muscle spasms

## 2023-03-21 NOTE — TELEPHONE ENCOUNTER
S/w the patient to review and he stated he doesn't think he has ever taken those other medications and a this point he would be willing to try  He appreciated the CB and the explanation  Please let me know which one you will order   Thanks

## 2023-03-24 RX ORDER — LIDOCAINE 50 MG/G
1 PATCH TOPICAL DAILY
Qty: 30 PATCH | Refills: 2 | Status: SHIPPED | OUTPATIENT
Start: 2023-03-24

## 2023-03-24 NOTE — TELEPHONE ENCOUNTER
S/w pt who is requesting a script for Lidocaine patches 5%  Pt had one at home that had been prescribed by another provider  and used it and it helped his pain  He is trying to limit the use of Tizanidine that pt states is also helping his pain  Pt requests [address]  Aware PA may be needed    Please advise

## 2023-03-24 NOTE — TELEPHONE ENCOUNTER
A detailed VMMLOM as per Daphene Bad  Script was sent to your pharmacy    C/b number office hours and location provided if any questions or concerns

## 2023-03-24 NOTE — TELEPHONE ENCOUNTER
Caller: Robbie Brush PT    Doctor: [id number] [facility]    Reason for call: Pt would like to try the Lidocaine patch as treatment  And send to the pharmacy      Call back#: [phone]

## 2023-03-28 ENCOUNTER — TELEPHONE (OUTPATIENT)
Dept: PULMONOLOGY | Facility: MEDICAL CENTER | Age: 63
End: 2023-03-28

## 2023-03-28 DIAGNOSIS — E78.2 MIXED HYPERLIPIDEMIA: Primary | ICD-10-CM

## 2023-03-28 DIAGNOSIS — E11.65 UNCONTROLLED TYPE 2 DIABETES MELLITUS WITH HYPERGLYCEMIA (HCC): Primary | ICD-10-CM

## 2023-03-28 DIAGNOSIS — N17.9 AKI (ACUTE KIDNEY INJURY) (HCC): ICD-10-CM

## 2023-03-28 DIAGNOSIS — F41.0 PANIC DISORDER WITHOUT AGORAPHOBIA: ICD-10-CM

## 2023-03-28 DIAGNOSIS — D69.6 PLATELETS DECREASED (HCC): ICD-10-CM

## 2023-03-28 DIAGNOSIS — I48.0 PAROXYSMAL ATRIAL FIBRILLATION (HCC): ICD-10-CM

## 2023-03-28 DIAGNOSIS — E11.8 TYPE 2 DIABETES MELLITUS WITH COMPLICATION, WITH LONG-TERM CURRENT USE OF INSULIN (HCC): ICD-10-CM

## 2023-03-28 DIAGNOSIS — Z79.4 TYPE 2 DIABETES MELLITUS WITH COMPLICATION, WITH LONG-TERM CURRENT USE OF INSULIN (HCC): ICD-10-CM

## 2023-03-28 DIAGNOSIS — J44.9 CHRONIC OBSTRUCTIVE PULMONARY DISEASE, UNSPECIFIED COPD TYPE (HCC): ICD-10-CM

## 2023-03-28 RX ORDER — ATORVASTATIN CALCIUM 20 MG/1
20 TABLET, FILM COATED ORAL DAILY
Qty: 30 TABLET | Refills: 5 | Status: SHIPPED | OUTPATIENT
Start: 2023-03-28

## 2023-03-28 NOTE — TELEPHONE ENCOUNTER
LM for patient to call office back to reschedule HFU/ oxygen cert appointment, please schedule next available

## 2023-03-29 ENCOUNTER — HOSPITAL ENCOUNTER (OUTPATIENT)
Facility: AMBULARY SURGERY CENTER | Age: 63
Setting detail: OUTPATIENT SURGERY
Discharge: HOME | End: 2023-03-29
Attending: PHYSICAL MEDICINE & REHABILITATION | Admitting: PHYSICAL MEDICINE & REHABILITATION

## 2023-03-29 ENCOUNTER — APPOINTMENT (OUTPATIENT)
Dept: RADIOLOGY | Facility: HOSPITAL | Age: 63
End: 2023-03-29

## 2023-03-29 VITALS
HEART RATE: 67 BPM | TEMPERATURE: 96.9 F | OXYGEN SATURATION: 100 % | DIASTOLIC BLOOD PRESSURE: 75 MMHG | RESPIRATION RATE: 18 BRPM | SYSTOLIC BLOOD PRESSURE: 160 MMHG

## 2023-03-29 DIAGNOSIS — R52 PAIN: ICD-10-CM

## 2023-03-29 RX ORDER — BUPIVACAINE HYDROCHLORIDE 2.5 MG/ML
INJECTION, SOLUTION EPIDURAL; INFILTRATION; INTRACAUDAL AS NEEDED
Status: DISCONTINUED | OUTPATIENT
Start: 2023-03-29 | End: 2023-03-29 | Stop reason: HOSPADM

## 2023-03-29 NOTE — OP NOTE
OPERATIVE REPORT  PATIENT NAME: Abhishek Malloy    :  1960  MRN: [id number]  Pt Location: [facility] MINOR/PAIN ROOM 01    SURGERY DATE: 3/29/2023    Surgeon(s) and Role:     * Mihai Tucker, DO - Primary    Preop Diagnosis:  Lumbar spondylosis [M47 816]  Facet arthropathy, lumbar [M47 816]    Post-Op Diagnosis Codes:     * Lumbar spondylosis [M47 816]     * Facet arthropathy, lumbar [M47 816]    Procedure(s):  Bilateral - BLOCK MEDIAL BRANCH L4-L5  L5 S1    Indication: Mechanical low back pain  Preoperative diagnosis: 1  Lumbar spondylosis  2   Low back pain  Postoperative diagnosis: 1  Lumbar spondylosis  2   Low back pain  Procedure: Fluoroscopically-guided bilateral L4, L5, S1 medial Branch Nerve/Dorsal Ramus Blocks using 0 25% bupivacaine  EBL: none  Specimens: not applicable  After discussing the risks, benefits, and alternatives to the procedure, the patient expressed understanding and wished to proceed  The patient was brought to the fluoroscopy suite and placed in the prone position  Procedural pause conducted to verify: correct patient identity, procedure to be performed and as applicable, correct side and site, correct patient position, and availability of implants, special equipment and special requirements  Using fluoroscopy, the junction of the transverse process and superior articulating process of the bilateral L4, L5, S1 medial branch levels were identified  The skin was sterilely prepped and draped in the usual fashion using Chloraprep skin prep  Using fluoroscopic guidance, a 3 5 inch 25-gauge spinal needle was advanced to each target  After negative aspiration, 0 5cc of 0 25% bupivacaine was injected at each site and the needles were then removed  The patient tolerated the procedure well and there were no apparent complications  After appropriate observation, the patient was dismissed from the clinic in good condition under their own power   The patient was instructed to keep a pain diary and report the results to our office          SIGNATURE: Kj Pak DO  DATE: March 29, 2023  TIME: 1:39 PM
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
## 2023-03-29 NOTE — DISCHARGE INSTRUCTIONS
ACTIVITY  Please do activities that will bring on the normal pain that we are rating  For example, if vacuuming or walking increases the pain, do that  This will give the most accurate response to the diary  You may shower, but no tub baths today, or applied heat  CARE OF THE INJECTION SITE  This area may be numb for several hours after the injection  Notify [facility] if you have any of the following:  redness, drainage, swelling, or fever above 100°F     SPECIAL INSTRUCTIONS  Please return the MBB diary to our office by mail, fax, or drop it off  MEDICATIONS  Please do not take any break through or short acting pain medications for 8 hours after the block  Continue to take all routine medications  Our office may have instructed you to hold some medications  As no general anesthesia was used in today's procedure, you should not experience any side effects related to anesthesia  If you have a problem specifically related to your procedure, please call our office at [phone]  Problems not related to your procedure should be directed to your primary care physician

## 2023-03-30 ENCOUNTER — TELEPHONE (OUTPATIENT)
Dept: PAIN MEDICINE | Facility: CLINIC | Age: 63
End: 2023-03-30

## 2023-03-30 NOTE — TELEPHONE ENCOUNTER
Caller: Alonzo    Doctor: Meagan Cardenas    Reason for call: patient calling to verbally tell you Pain diary he has no fax    Call back#: [phone]

## 2023-03-30 NOTE — TELEPHONE ENCOUNTER
S/w pt who reports that he had % pain relief for 7 hours yesterday before his pain came back after MBB  Pt advised he would be contacted with next steps     Pt wanted to thank GEORGE for helping the pt get back to a normal life

## 2023-03-30 NOTE — TELEPHONE ENCOUNTER
Is verbally reporting a successful medial branch block  Please proceed with schedule MBB #2  Thank you

## 2023-04-01 DIAGNOSIS — E11.65 UNCONTROLLED TYPE 2 DIABETES MELLITUS WITH HYPERGLYCEMIA (HCC): ICD-10-CM

## 2023-04-03 RX ORDER — LIRAGLUTIDE 6 MG/ML
INJECTION SUBCUTANEOUS
Qty: 9 ML | Refills: 0 | Status: SHIPPED | OUTPATIENT
Start: 2023-04-03

## 2023-04-03 RX ORDER — INSULIN GLARGINE 100 [IU]/ML
INJECTION, SOLUTION SUBCUTANEOUS
Qty: 45 ML | Refills: 0 | Status: SHIPPED | OUTPATIENT
Start: 2023-04-03

## 2023-04-24 ENCOUNTER — TELEPHONE (OUTPATIENT)
Dept: PAIN MEDICINE | Facility: MEDICAL CENTER | Age: 63
End: 2023-04-24

## 2023-04-24 NOTE — TELEPHONE ENCOUNTER
----- Message from Jesus Watson sent at 4/24/2023 11:30 AM EDT -----  Regarding: Advice message  Contact: [phone]  aHwk Kay My phone is still not working  Tell Alie to go a schedule epidural steroid injection  I will let you know once my phone is working    Thank you  Vitaliy Madera

## 2023-04-24 NOTE — TELEPHONE ENCOUNTER
"Attempted to reach pt  Unable to leave a voicemail  Received a message \"The subscriber you have dialed is not in service  \"    Mister Mario message sent advising pt of same and requesting pt to provide a number to reach him     "

## 2023-04-24 NOTE — TELEPHONE ENCOUNTER
[facility]  Caller: Unspecified (3 days ago,  8:55 PM)  I spoke with the doctor and they state that since the second MBB did not provide you any pain relief you cannot continue on and have the radiofrequency ablation   I believe you should have an L5-S1 lumbar epidural steroid injection I believe this will provide you the most benefit and should not be nearly as painful as the TFESI you experienced   After this is scheduled I can send over the Ativan to help you through the procedure

## 2023-04-25 NOTE — TELEPHONE ENCOUNTER
Per Erna message:     April 24, 2023  Palak Whiting RN  to Sindy Prieto      10:43 AM  Steve Charles,  I tried to reach you by phone but received a message that the number dialed was not in service  Please provide a number where you can be reached at your next appt or you can also call [phone]  Please see Alie's message below and let us know how you would like to proceed? Thank you,  MANAS Polanco RN  [facility]  Caller: Unspecified (3 days ago,  8:55 PM)  I spoke with the doctor and they state that since the second MBB did not provide you any pain relief you cannot continue on and have the radiofrequency ablation  I believe you should have an L5-S1 lumbar epidural steroid injection I believe this will provide you the most benefit and should not be nearly as painful as the TFESI you experienced  After this is scheduled I can send over the Ativan to help you through the procedure  Last read by Sindy Prieto at 11:27 AM on 4/24/2023  Sindy Prieto  to [facility] (supporting Nikki Patino RN)      11:30 AM  Hawk Zamudio My phone is still not working  Tell Alie to go a schedule epidural steroid injection  I will let you know once my phone is working  Thank you  Katt Patrick    12:34 PM  Elma Peña RN routed this conversation to Stone Bettencourt [address] [place], [place] [address]MANAS  to [facility] Coordinator [facility]      12:52 PM  Patient currently does not have a phone that is working  But he is interested on having an L5-S1 lumbar epidural steroid injection  Patient states that he uses his email  And also was able to respond to my chart  If you could please schedule L5-S1 LESI with Dr Renae Gomez  Patient requires the use of Ativan  Please let me know the dates so that I may call in this medication for him

## 2023-04-26 ENCOUNTER — TELEPHONE (OUTPATIENT)
Dept: PAIN MEDICINE | Facility: CLINIC | Age: 63
End: 2023-04-26

## 2023-04-26 ENCOUNTER — HOSPITAL ENCOUNTER (OUTPATIENT)
Facility: AMBULARY SURGERY CENTER | Age: 63
Setting detail: OUTPATIENT SURGERY
End: 2023-04-26
Attending: ANESTHESIOLOGY | Admitting: ANESTHESIOLOGY

## 2023-04-26 NOTE — TELEPHONE ENCOUNTER
Scheduled patient for 5/12/23  Patient is taking Eliquis  Nothing to eat or drink 1 hour prior to procedure  Needs to arrange transportation  Proper clothing for procedure  No vaccines 2 weeks prior or after procedure  If ill or place on antibiotics, please call to reschedule        [facility] Melvi Norton  for a epidural steroid injection on 5/12/23  The patient has currently taking Eliquis, this medication needs to be held for 3 days prior to the procedure  Can the patient hold this medication for required days prior to the procedure?

## 2023-04-26 NOTE — TELEPHONE ENCOUNTER
[facility] message sent to pt advising LD Alexysis 5/8/23  Hold 5/9-12 and pt will be advised to resume after the procedure

## 2023-04-26 NOTE — TELEPHONE ENCOUNTER
Attempted to contact pt as approval rec'd to hold Eliquis from [address] [place] [address], but number listed is no longer in service  Attempted to contact Sergo Burdick (listed on medical communication form)  [facility] requesting pt cb  Provided with phone# and OH      Tuesday [place], CRNP  [place] 27 minutes ago (2:51 PM)     TR  Yes   Thank you

## 2023-04-27 DIAGNOSIS — E11.65 UNCONTROLLED TYPE 2 DIABETES MELLITUS WITH HYPERGLYCEMIA (HCC): ICD-10-CM

## 2023-04-27 DIAGNOSIS — Z79.4 TYPE 2 DIABETES MELLITUS WITH COMPLICATION, WITH LONG-TERM CURRENT USE OF INSULIN (HCC): ICD-10-CM

## 2023-04-27 DIAGNOSIS — F41.9 ANXIETY DUE TO INVASIVE PROCEDURE: ICD-10-CM

## 2023-04-27 DIAGNOSIS — E11.8 TYPE 2 DIABETES MELLITUS WITH COMPLICATION, WITH LONG-TERM CURRENT USE OF INSULIN (HCC): ICD-10-CM

## 2023-04-27 RX ORDER — INSULIN ASPART 100 [IU]/ML
INJECTION, SOLUTION INTRAVENOUS; SUBCUTANEOUS
Qty: 15 ML | Refills: 1 | Status: ON HOLD | OUTPATIENT
Start: 2023-04-27

## 2023-04-27 RX ORDER — LORAZEPAM 0.5 MG/1
TABLET ORAL
Qty: 2 TABLET | Refills: 0 | Status: SHIPPED | OUTPATIENT
Start: 2023-05-10 | End: 2023-05-11

## 2023-04-28 RX ORDER — LIRAGLUTIDE 6 MG/ML
INJECTION SUBCUTANEOUS
Qty: 9 ML | Refills: 0 | Status: ON HOLD | OUTPATIENT
Start: 2023-04-28

## 2023-05-01 ENCOUNTER — TELEPHONE (OUTPATIENT)
Dept: CARDIOLOGY CLINIC | Facility: CLINIC | Age: 63
End: 2023-05-01

## 2023-05-01 NOTE — TELEPHONE ENCOUNTER
----- Message from Sunday Acosta sent at 5/1/2023  2:51 PM EDT -----  Regarding: Quentin Manning  Contact: [address] Percy Saunders    I am out of Spironolocatone 25 mg  The last time that I had a refill was on 09/23/2022  I have lost a lot of weight  I am not sure what weight that you have on record was however this morning my weight is 249 lbs  Is this a water pill and should I still be on it? If you do want me to be on it pharmacy is [facility]    Thank you  Quentin Manning 1960

## 2023-05-03 ENCOUNTER — IN HOME VISIT (OUTPATIENT)
Dept: FAMILY MEDICINE CLINIC | Facility: CLINIC | Age: 63
End: 2023-05-03

## 2023-05-03 ENCOUNTER — APPOINTMENT (OUTPATIENT)
Dept: LAB | Facility: CLINIC | Age: 63
End: 2023-05-03

## 2023-05-03 VITALS
RESPIRATION RATE: 20 BRPM | DIASTOLIC BLOOD PRESSURE: 100 MMHG | OXYGEN SATURATION: 95 % | SYSTOLIC BLOOD PRESSURE: 160 MMHG | TEMPERATURE: 96.8 F | HEART RATE: 84 BPM

## 2023-05-03 DIAGNOSIS — R19.7 DIARRHEA, UNSPECIFIED TYPE: ICD-10-CM

## 2023-05-03 DIAGNOSIS — R05.9 COUGH, UNSPECIFIED TYPE: ICD-10-CM

## 2023-05-03 DIAGNOSIS — E11.65 UNCONTROLLED TYPE 2 DIABETES MELLITUS WITH HYPERGLYCEMIA (HCC): ICD-10-CM

## 2023-05-03 DIAGNOSIS — I50.32 CHRONIC DIASTOLIC (CONGESTIVE) HEART FAILURE (HCC): Primary | ICD-10-CM

## 2023-05-03 DIAGNOSIS — C91.10 CLL (CHRONIC LYMPHOCYTIC LEUKEMIA) (HCC): ICD-10-CM

## 2023-05-03 LAB
ALBUMIN SERPL BCP-MCNC: 4.1 G/DL (ref 3.5–5)
ALP SERPL-CCNC: 85 U/L (ref 46–116)
ALT SERPL W P-5'-P-CCNC: 39 U/L (ref 12–78)
ANION GAP SERPL CALCULATED.3IONS-SCNC: 5 MMOL/L (ref 4–13)
AST SERPL W P-5'-P-CCNC: 27 U/L (ref 5–45)
BASOPHILS # BLD AUTO: 0.05 THOUSANDS/ÂΜL (ref 0–0.1)
BASOPHILS NFR BLD AUTO: 0 % (ref 0–1)
BILIRUB SERPL-MCNC: 0.31 MG/DL (ref 0.2–1)
BUN SERPL-MCNC: 14 MG/DL (ref 5–25)
CALCIUM SERPL-MCNC: 10.5 MG/DL (ref 8.3–10.1)
CHLORIDE SERPL-SCNC: 97 MMOL/L (ref 96–108)
CO2 SERPL-SCNC: 28 MMOL/L (ref 21–32)
CREAT SERPL-MCNC: 0.85 MG/DL (ref 0.6–1.3)
EOSINOPHIL # BLD AUTO: 0.07 THOUSAND/ÂΜL (ref 0–0.61)
EOSINOPHIL NFR BLD AUTO: 1 % (ref 0–6)
ERYTHROCYTE [DISTWIDTH] IN BLOOD BY AUTOMATED COUNT: 12.6 % (ref 11.6–15.1)
GFR SERPL CREATININE-BSD FRML MDRD: 92 ML/MIN/1.73SQ M
GLUCOSE SERPL-MCNC: 219 MG/DL (ref 65–140)
HCT VFR BLD AUTO: 42.5 % (ref 36.5–49.3)
HGB BLD-MCNC: 14.4 G/DL (ref 12–17)
IMM GRANULOCYTES # BLD AUTO: 0.05 THOUSAND/UL (ref 0–0.2)
IMM GRANULOCYTES NFR BLD AUTO: 0 % (ref 0–2)
LYMPHOCYTES # BLD AUTO: 9.46 THOUSANDS/ÂΜL (ref 0.6–4.47)
LYMPHOCYTES NFR BLD AUTO: 62 % (ref 14–44)
MCH RBC QN AUTO: 31.4 PG (ref 26.8–34.3)
MCHC RBC AUTO-ENTMCNC: 33.9 G/DL (ref 31.4–37.4)
MCV RBC AUTO: 93 FL (ref 82–98)
MONOCYTES # BLD AUTO: 0.7 THOUSAND/ÂΜL (ref 0.17–1.22)
MONOCYTES NFR BLD AUTO: 5 % (ref 4–12)
NEUTROPHILS # BLD AUTO: 4.89 THOUSANDS/ÂΜL (ref 1.85–7.62)
NEUTS SEG NFR BLD AUTO: 32 % (ref 43–75)
NRBC BLD AUTO-RTO: 0 /100 WBCS
PLATELET # BLD AUTO: 203 THOUSANDS/UL (ref 149–390)
PMV BLD AUTO: 9.8 FL (ref 8.9–12.7)
POTASSIUM SERPL-SCNC: 4.9 MMOL/L (ref 3.5–5.3)
PROT SERPL-MCNC: 7.6 G/DL (ref 6.4–8.4)
RBC # BLD AUTO: 4.58 MILLION/UL (ref 3.88–5.62)
SODIUM SERPL-SCNC: 130 MMOL/L (ref 135–147)
WBC # BLD AUTO: 15.22 THOUSAND/UL (ref 4.31–10.16)

## 2023-05-03 RX ORDER — INSULIN GLARGINE 100 [IU]/ML
70 INJECTION, SOLUTION SUBCUTANEOUS 2 TIMES DAILY
Qty: 42 ML | Refills: 3 | Status: ON HOLD | OUTPATIENT
Start: 2023-05-03 | End: 2023-06-02

## 2023-05-03 RX ORDER — BISMUTH SUBSALICYLATE 262 MG/1
524 TABLET, CHEWABLE ORAL
Qty: 30 TABLET | Refills: 0 | Status: ON HOLD | OUTPATIENT
Start: 2023-05-03 | End: 2023-05-13

## 2023-05-03 RX ORDER — SPIRONOLACTONE 25 MG/1
25 TABLET ORAL DAILY
Qty: 90 TABLET | Refills: 0 | Status: ON HOLD | OUTPATIENT
Start: 2023-05-03 | End: 2023-08-01

## 2023-05-03 RX ORDER — SPIRONOLACTONE 25 MG/1
25 TABLET ORAL DAILY
COMMUNITY
End: 2023-05-03 | Stop reason: SDUPTHER

## 2023-05-03 NOTE — TELEPHONE ENCOUNTER
----- Message from Veracode Samson Watson sent at 5/2/2023  7:00 PM EDT -----  Regarding: Ha Callaway  Contact: [phone]  Yes just one in the morning    Thanks  Alonzo

## 2023-05-03 NOTE — PROGRESS NOTES
Name: Alonzo Delgado      : 1960      MRN: [id number]  Encounter Provider: MANAS Murcia  Encounter Date: 5/3/2023   Encounter department: [facility]     1  Cough, unspecified type  Informed patient to notify office if cough worsens  2  Uncontrolled type 2 diabetes mellitus with hyperglycemia (HCC)  -     Insulin Glargine Solostar (Lantus SoloStar) 100 UNIT/ML SOPN; Inject 0 7 mL (70 Units total) under the skin 2 (two) times a day    3  Diarrhea, unspecified type  -     bismuth subsalicylate (PEPTO BISMOL) 262 MG chewable tablet; Chew 2 tablets (524 mg total) 4 (four) times a day (before meals and at bedtime) for 10 days           Subjective      Homebound patient seen today for complaints of cough with sputum  Concerned he no longer has an endocrinologist and his blood sugars are abnormal ranging from 233- to 420 and primarily in the mid 300s on a regular basis  In addition he complains of diarrhea  Changes made to diabetic regimen  A  Medication ordered for diarrhea  Educated on diabetes and management of what is low blood sugar and management of low blood sugar  Educated on diet and fluid intake  Greater than 40 minutes spent with patient on education, exam, assessment, diagnoses, and decision making  Review of Systems   Constitutional: Negative  HENT: Negative  Eyes: Negative  Respiratory: Positive for cough  Cardiovascular: Negative  Gastrointestinal: Positive for diarrhea  Endocrine: Negative  Genitourinary: Negative  Musculoskeletal: Positive for arthralgias  Allergic/Immunologic: Negative  Neurological: Positive for weakness  Hematological: Negative  Psychiatric/Behavioral: Negative          Current Outpatient Medications on File Prior to Visit   Medication Sig    NovoLOG FlexPen 100 units/mL injection pen INJECT 35 UNITS UNDER THE SKIN THREE TIMES DAILY WITH MEALS PER SLIDING SCALE    spironolactone (ALDACTONE) 25 mg tablet Take 1 tablet (25 mg total) by mouth daily    acetaminophen (TYLENOL) 325 mg tablet Take 2 tablets (650 mg total) by mouth every 6 (six) hours as needed for mild pain, headaches or fever    albuterol (2 5 mg/3 mL) 0 083 % nebulizer solution Take 1 vial (2 5 mg total) by nebulization every 6 (six) hours as needed for wheezing    Alcohol Swabs (Alcohol Prep) 70 % PADS AS DIRECTED    apixaban (Eliquis) 5 mg Take 1 tablet (5 mg total) by mouth 2 (two) times a day    Ascorbic Acid (VITAMIN C) 1000 MG tablet Take 1,000 mg by mouth daily    aspirin 81 MG tablet Take 81 mg by mouth every morning      atorvastatin (LIPITOR) 20 mg tablet Take 1 tablet (20 mg total) by mouth daily    B-D ULTRAFINE III SHORT PEN 31G X 8 MM MISC Use as directed    busPIRone (BUSPAR) 10 mg tablet TAKE ONE TABLET BY MOUTH TWICE DAILY    cholecalciferol (VITAMIN D3) 1,000 units tablet Take 1 tablet (1,000 Units total) by mouth daily    [place] Choice Comfort EZ 33G X 4 MM MISC USE TO INJECT INSULIN 5 TIMES A DAY    Continuous Blood Gluc Sensor (FreeStyle [place] 14 Day Sensor) MISC Use 1 application every 14 (fourteen) days    Continuous Blood Gluc Sensor (FreeStyle [place] 14 Day Sensor) MISC Use as directed-    COVID-19 At Home Test (FLOWFLEX COVID-19 AG HOME TEST VI)     digoxin (LANOXIN) 0 25 mg tablet Take 1 tablet (250 mcg total) by mouth daily    fluticasone-umeclidinium-vilanterol (TRELEGY) 100-62 5-25 MCG/INH inhaler Inhale 1 puff daily Rinse mouth after use      gabapentin (Neurontin) 600 MG tablet Take 1 tablet (600 mg total) by mouth 3 (three) times a day    Icosapent Ethyl (Vascepa) 1 g CAPS Take 2 capsules (2 g total) by mouth 2 (two) times a day    Insulin Pen Needle ([place] Choice Comfort EZ) 33G X 4 MM MISC USE TO INJECT INSULIN 5 TIMES A DAY    lamoTRIgine (LaMICtal) 25 mg tablet 25 mg daily at bedtime    Lancet Devices (Adjustable Lancing Device) MISC USE AS DIRECTED    Lancets (onetouch ultrasoft) lancets test blood sugar 3 (three) times a day    [START ON 5/10/2023] LORazepam (Ativan) 0 5 mg tablet Patient to use 1 tablet 60 minutes prior to procedure time may take second tablet if necessary for procedure on 5/12/2023 Do not start before May 10, 2023   losartan (COZAAR) 50 mg tablet TAKE ONE TABLET BY MOUTH DAILY    metFORMIN (GLUCOPHAGE-XR) 500 mg 24 hr tablet TAKE ONE TABLET BY MOUTH DAILY    metoprolol succinate (TOPROL-XL) 200 MG 24 hr tablet TAKE 1 TABLET DAILY    Multiple Vitamins-Minerals (CENTRUM SILVER 50+MEN PO) Take by mouth    omeprazole (PriLOSEC) 20 mg delayed release capsule TAKE ONE CAPSULE BY MOUTH DAILY EVERY MORNING    potassium chloride (K-DUR,KLOR-CON) 20 mEq tablet Take 1 tablet (20 mEq total) by mouth daily    QUEtiapine (SEROquel XR) 50 mg Take 2 tablets (100 mg total) by mouth every morning    QUEtiapine (SEROquel) 100 mg tablet     QUEtiapine (SEROquel) 300 mg tablet Take 1 tablet (300 mg total) by mouth daily at bedtime    sertraline (ZOLOFT) 50 mg tablet Take 1 tablet (50 mg total) by mouth daily Daily at bedtime    tiZANidine (ZANAFLEX) 4 mg tablet Take 1 tablet (4 mg total) by mouth every 8 (eight) hours as needed for muscle spasms    Unifine SafeControl Pen Needle 30G X 5 MM MISC     Victoza injection INJECT 0 3ML UNDER THE SKIN EVERY MORNING    [DISCONTINUED] Basaglar KwikPen 100 units/mL SOPN 76 UNITS IN THE MORNING, AND AT BEDTIME OR AS CURRENTLY DIRECTED BY PRESCRIBER      [DISCONTINUED] guaiFENesin 1200 MG TB12 Take 1 tablet (1,200 mg total) by mouth 2 (two) times a day    [DISCONTINUED] lidocaine (Lidoderm) 5 % Apply 1 patch topically over 12 hours daily Remove & Discard patch within 12 hours or as directed by MD (Patient not taking: Reported on 4/19/2023)    [DISCONTINUED] spironolactone (ALDACTONE) 25 mg tablet Take 25 mg by mouth daily       Objective     /100   Pulse 84   Temp (!) 96 8 °F (36 °C)   Resp 20   SpO2 95%     Physical Exam  Constitutional:       General: He is not in acute distress  Appearance: He is obese  He is not ill-appearing, toxic-appearing or diaphoretic  HENT:      Head: Normocephalic and atraumatic  Right Ear: External ear normal       Left Ear: External ear normal       Nose: Nose normal  No congestion  Mouth/Throat:      Mouth: Mucous membranes are moist    Eyes:      General:         Right eye: No discharge  Left eye: No discharge  Conjunctiva/sclera: Conjunctivae normal    Cardiovascular:      Rate and Rhythm: Normal rate and regular rhythm  Pulses: no weak pulses     Heart sounds: Normal heart sounds  Pulmonary:      Effort: Pulmonary effort is normal  No respiratory distress  Breath sounds: No wheezing, rhonchi or rales  Comments:  Cough with brown sputum  Abdominal:      General: Bowel sounds are normal    Musculoskeletal:      Cervical back: Normal range of motion  No rigidity  Right lower leg: No edema  Left lower leg: No edema  Feet:      Right foot:      Skin integrity: No ulcer, skin breakdown, erythema, warmth, callus or dry skin  Left foot:      Skin integrity: No ulcer, skin breakdown, erythema, warmth, callus or dry skin  Skin:     General: Skin is warm and dry  Neurological:      Mental Status: He is alert and oriented to person, place, and time  Psychiatric:         Mood and Affect: Mood normal          Behavior: Behavior normal          Thought Content: Thought content normal          Judgment: Judgment normal        Tuesday [place], CRNPDiabetic Foot Exam    Patient's shoes and socks removed  Right Foot/Ankle   Right Foot Inspection  Skin Exam: skin normal and skin intact  No dry skin, no warmth, no callus, no erythema, no maceration, no abnormal color, no pre-ulcer, no ulcer and no callus  Toe Exam: ROM and strength within normal limits  Left Foot/Ankle  Left Foot Inspection  Skin Exam: skin normal and skin intact   No dry skin, no warmth, no erythema, no maceration, normal color, no pre-ulcer, no ulcer and no callus  Toe Exam: ROM and strength within normal limits       Assign Risk Category  No deformity present  No loss of protective sensation  No weak pulses  Risk: 0

## 2023-05-04 ENCOUNTER — TELEPHONE (OUTPATIENT)
Dept: FAMILY MEDICINE CLINIC | Facility: CLINIC | Age: 63
End: 2023-05-04

## 2023-05-05 ENCOUNTER — APPOINTMENT (EMERGENCY)
Dept: RADIOLOGY | Facility: HOSPITAL | Age: 63
End: 2023-05-05

## 2023-05-05 ENCOUNTER — HOSPITAL ENCOUNTER (INPATIENT)
Facility: HOSPITAL | Age: 63
LOS: 6 days | Discharge: HOME HEALTH | End: 2023-05-11
Attending: EMERGENCY MEDICINE | Admitting: FAMILY MEDICINE

## 2023-05-05 DIAGNOSIS — E83.42 HYPOMAGNESEMIA: ICD-10-CM

## 2023-05-05 DIAGNOSIS — D72.829 LEUKOCYTOSIS: ICD-10-CM

## 2023-05-05 DIAGNOSIS — A41.9 SEPSIS WITHOUT ACUTE ORGAN DYSFUNCTION, DUE TO UNSPECIFIED ORGANISM (HCC): ICD-10-CM

## 2023-05-05 DIAGNOSIS — E11.8 TYPE 2 DIABETES MELLITUS WITH COMPLICATION, WITH LONG-TERM CURRENT USE OF INSULIN (HCC): ICD-10-CM

## 2023-05-05 DIAGNOSIS — J42 CHRONIC BRONCHITIS, UNSPECIFIED CHRONIC BRONCHITIS TYPE (HCC): ICD-10-CM

## 2023-05-05 DIAGNOSIS — R05.1 ACUTE COUGH: ICD-10-CM

## 2023-05-05 DIAGNOSIS — J18.9 PNEUMONIA: Primary | ICD-10-CM

## 2023-05-05 DIAGNOSIS — J44.1 COPD EXACERBATION (HCC): ICD-10-CM

## 2023-05-05 DIAGNOSIS — T78.40XA ALLERGY, INITIAL ENCOUNTER: ICD-10-CM

## 2023-05-05 DIAGNOSIS — Z79.4 TYPE 2 DIABETES MELLITUS WITH COMPLICATION, WITH LONG-TERM CURRENT USE OF INSULIN (HCC): ICD-10-CM

## 2023-05-05 DIAGNOSIS — E78.2 MIXED HYPERLIPIDEMIA: ICD-10-CM

## 2023-05-05 DIAGNOSIS — E11.65 UNCONTROLLED TYPE 2 DIABETES MELLITUS WITH HYPERGLYCEMIA (HCC): ICD-10-CM

## 2023-05-05 PROBLEM — R53.1 GENERALIZED WEAKNESS: Status: ACTIVE | Noted: 2023-05-05

## 2023-05-05 PROBLEM — R26.2 AMBULATORY DYSFUNCTION: Status: ACTIVE | Noted: 2023-05-05

## 2023-05-05 LAB
2HR DELTA HS TROPONIN: 0 NG/L
4HR DELTA HS TROPONIN: 1 NG/L
ALBUMIN SERPL BCP-MCNC: 4.2 G/DL (ref 3.5–5)
ALP SERPL-CCNC: 77 U/L (ref 34–104)
ALT SERPL W P-5'-P-CCNC: 26 U/L (ref 7–52)
ANION GAP SERPL CALCULATED.3IONS-SCNC: 10 MMOL/L (ref 4–13)
APTT PPP: 27 SECONDS (ref 23–37)
ARTERIAL PATENCY WRIST A: YES
AST SERPL W P-5'-P-CCNC: 26 U/L (ref 13–39)
BASE EXCESS BLDA CALC-SCNC: -1.4 MMOL/L
BILIRUB SERPL-MCNC: 0.28 MG/DL (ref 0.2–1)
BILIRUB UR QL STRIP: NEGATIVE
BNP SERPL-MCNC: 59 PG/ML (ref 0–100)
BODY TEMPERATURE: 98.3 DEGREES FEHRENHEIT
BUN SERPL-MCNC: 12 MG/DL (ref 5–25)
CALCIUM SERPL-MCNC: 8.3 MG/DL (ref 8.4–10.2)
CARDIAC TROPONIN I PNL SERPL HS: 11 NG/L
CARDIAC TROPONIN I PNL SERPL HS: 11 NG/L
CARDIAC TROPONIN I PNL SERPL HS: 12 NG/L
CHLORIDE SERPL-SCNC: 94 MMOL/L (ref 96–108)
CLARITY UR: CLEAR
CO2 SERPL-SCNC: 26 MMOL/L (ref 21–32)
COLOR UR: ABNORMAL
CREAT SERPL-MCNC: 0.69 MG/DL (ref 0.6–1.3)
ERYTHROCYTE [DISTWIDTH] IN BLOOD BY AUTOMATED COUNT: 12.7 % (ref 11.6–15.1)
FLUAV RNA RESP QL NAA+PROBE: NEGATIVE
FLUBV RNA RESP QL NAA+PROBE: NEGATIVE
GFR SERPL CREATININE-BSD FRML MDRD: 101 ML/MIN/1.73SQ M
GLUCOSE SERPL-MCNC: 241 MG/DL (ref 65–140)
GLUCOSE SERPL-MCNC: 305 MG/DL (ref 65–140)
GLUCOSE UR STRIP-MCNC: ABNORMAL MG/DL
HCO3 BLDA-SCNC: 22.9 MMOL/L (ref 22–28)
HCT VFR BLD AUTO: 41.2 % (ref 36.5–49.3)
HGB BLD-MCNC: 14.4 G/DL (ref 12–17)
HGB UR QL STRIP.AUTO: NEGATIVE
INR PPP: 1.04 (ref 0.84–1.19)
KETONES UR STRIP-MCNC: NEGATIVE MG/DL
LACTATE SERPL-SCNC: 2.2 MMOL/L (ref 0.5–2)
LACTATE SERPL-SCNC: 2.6 MMOL/L (ref 0.5–2)
LEUKOCYTE ESTERASE UR QL STRIP: NEGATIVE
LIPASE SERPL-CCNC: 23 U/L (ref 11–82)
MAGNESIUM SERPL-MCNC: 1.8 MG/DL (ref 1.9–2.7)
MCH RBC QN AUTO: 32.4 PG (ref 26.8–34.3)
MCHC RBC AUTO-ENTMCNC: 35 G/DL (ref 31.4–37.4)
MCV RBC AUTO: 93 FL (ref 82–98)
NASAL CANNULA: 5
NITRITE UR QL STRIP: NEGATIVE
O2 CT BLDA-SCNC: 19.2 ML/DL (ref 16–23)
OXYHGB MFR BLDA: 96.8 % (ref 94–97)
PCO2 BLDA: 37.3 MM HG (ref 36–44)
PCO2 TEMP ADJ BLDA: 37 MM HG (ref 36–44)
PH BLD: 7.41 [PH] (ref 7.35–7.45)
PH BLDA: 7.41 [PH] (ref 7.35–7.45)
PH UR STRIP.AUTO: 6.5 [PH]
PLATELET # BLD AUTO: 189 THOUSANDS/UL (ref 149–390)
PMV BLD AUTO: 9.1 FL (ref 8.9–12.7)
PO2 BLD: 106.8 MM HG (ref 75–129)
PO2 BLDA: 108 MM HG (ref 75–129)
POTASSIUM SERPL-SCNC: 4.4 MMOL/L (ref 3.5–5.3)
PROCALCITONIN SERPL-MCNC: 0.05 NG/ML
PROT SERPL-MCNC: 7 G/DL (ref 6.4–8.4)
PROT UR STRIP-MCNC: NEGATIVE MG/DL
PROTHROMBIN TIME: 13.7 SECONDS (ref 11.6–14.5)
RBC # BLD AUTO: 4.45 MILLION/UL (ref 3.88–5.62)
RSV RNA RESP QL NAA+PROBE: NEGATIVE
SARS-COV-2 RNA RESP QL NAA+PROBE: NEGATIVE
SODIUM SERPL-SCNC: 130 MMOL/L (ref 135–147)
SP GR UR STRIP.AUTO: <=1.005 (ref 1–1.03)
SPECIMEN SOURCE: NORMAL
UROBILINOGEN UR QL STRIP.AUTO: 0.2 E.U./DL
WBC # BLD AUTO: 16.78 THOUSAND/UL (ref 4.31–10.16)

## 2023-05-05 PROCEDURE — 5A09357 ASSISTANCE WITH RESPIRATORY VENTILATION, LESS THAN 24 CONSECUTIVE HOURS, CONTINUOUS POSITIVE AIRWAY PRESSURE: ICD-10-PCS | Performed by: FAMILY MEDICINE

## 2023-05-05 RX ORDER — INSULIN GLARGINE 100 [IU]/ML
45 INJECTION, SOLUTION SUBCUTANEOUS 2 TIMES DAILY
Status: DISCONTINUED | OUTPATIENT
Start: 2023-05-05 | End: 2023-05-08

## 2023-05-05 RX ORDER — ATORVASTATIN CALCIUM 20 MG/1
20 TABLET, FILM COATED ORAL
Status: DISCONTINUED | OUTPATIENT
Start: 2023-05-06 | End: 2023-05-07

## 2023-05-05 RX ORDER — INSULIN LISPRO 100 [IU]/ML
25 INJECTION, SOLUTION INTRAVENOUS; SUBCUTANEOUS
Status: DISCONTINUED | OUTPATIENT
Start: 2023-05-05 | End: 2023-05-05

## 2023-05-05 RX ORDER — LOSARTAN POTASSIUM 50 MG/1
50 TABLET ORAL DAILY
Status: DISCONTINUED | OUTPATIENT
Start: 2023-05-06 | End: 2023-05-11 | Stop reason: HOSPADM

## 2023-05-05 RX ORDER — SPIRONOLACTONE 25 MG/1
25 TABLET ORAL DAILY
Status: DISCONTINUED | OUTPATIENT
Start: 2023-05-06 | End: 2023-05-11 | Stop reason: HOSPADM

## 2023-05-05 RX ORDER — CALCIUM CARBONATE 200(500)MG
1000 TABLET,CHEWABLE ORAL DAILY PRN
Status: DISCONTINUED | OUTPATIENT
Start: 2023-05-05 | End: 2023-05-11 | Stop reason: HOSPADM

## 2023-05-05 RX ORDER — ASCORBIC ACID 500 MG
1000 TABLET ORAL DAILY
Status: DISCONTINUED | OUTPATIENT
Start: 2023-05-06 | End: 2023-05-11 | Stop reason: HOSPADM

## 2023-05-05 RX ORDER — ONDANSETRON 2 MG/ML
4 INJECTION INTRAMUSCULAR; INTRAVENOUS EVERY 6 HOURS PRN
Status: DISCONTINUED | OUTPATIENT
Start: 2023-05-05 | End: 2023-05-11 | Stop reason: HOSPADM

## 2023-05-05 RX ORDER — ASPIRIN 81 MG/1
81 TABLET ORAL DAILY
Status: DISCONTINUED | OUTPATIENT
Start: 2023-05-06 | End: 2023-05-11 | Stop reason: HOSPADM

## 2023-05-05 RX ORDER — ACETAMINOPHEN 325 MG/1
650 TABLET ORAL EVERY 6 HOURS PRN
Status: DISCONTINUED | OUTPATIENT
Start: 2023-05-05 | End: 2023-05-11 | Stop reason: HOSPADM

## 2023-05-05 RX ORDER — METOPROLOL SUCCINATE 100 MG/1
200 TABLET, EXTENDED RELEASE ORAL DAILY
Status: DISCONTINUED | OUTPATIENT
Start: 2023-05-06 | End: 2023-05-11 | Stop reason: HOSPADM

## 2023-05-05 RX ORDER — GABAPENTIN 300 MG/1
600 CAPSULE ORAL 3 TIMES DAILY
Status: DISCONTINUED | OUTPATIENT
Start: 2023-05-05 | End: 2023-05-11 | Stop reason: HOSPADM

## 2023-05-05 RX ORDER — GUAIFENESIN/DEXTROMETHORPHAN 100-10MG/5
10 SYRUP ORAL EVERY 4 HOURS
Status: DISCONTINUED | OUTPATIENT
Start: 2023-05-05 | End: 2023-05-06

## 2023-05-05 RX ORDER — CHLORAL HYDRATE 500 MG
1000 CAPSULE ORAL DAILY
Status: DISCONTINUED | OUTPATIENT
Start: 2023-05-06 | End: 2023-05-11 | Stop reason: HOSPADM

## 2023-05-05 RX ORDER — HYDROMORPHONE HCL/PF 1 MG/ML
1 SYRINGE (ML) INJECTION ONCE
Status: COMPLETED | OUTPATIENT
Start: 2023-05-05 | End: 2023-05-05

## 2023-05-05 RX ORDER — BUSPIRONE HYDROCHLORIDE 10 MG/1
10 TABLET ORAL 2 TIMES DAILY
Status: DISCONTINUED | OUTPATIENT
Start: 2023-05-05 | End: 2023-05-11 | Stop reason: HOSPADM

## 2023-05-05 RX ORDER — CEFTRIAXONE 1 G/50ML
1000 INJECTION, SOLUTION INTRAVENOUS EVERY 24 HOURS
Status: DISCONTINUED | OUTPATIENT
Start: 2023-05-05 | End: 2023-05-10

## 2023-05-05 RX ORDER — QUETIAPINE FUMARATE 50 MG/1
100 TABLET, EXTENDED RELEASE ORAL EVERY MORNING
Status: DISCONTINUED | OUTPATIENT
Start: 2023-05-06 | End: 2023-05-11 | Stop reason: HOSPADM

## 2023-05-05 RX ORDER — SODIUM CHLORIDE 9 MG/ML
100 INJECTION, SOLUTION INTRAVENOUS CONTINUOUS
Status: DISCONTINUED | OUTPATIENT
Start: 2023-05-05 | End: 2023-05-06

## 2023-05-05 RX ORDER — DIGOXIN 125 MCG
250 TABLET ORAL DAILY
Status: DISCONTINUED | OUTPATIENT
Start: 2023-05-06 | End: 2023-05-11 | Stop reason: HOSPADM

## 2023-05-05 RX ORDER — LAMOTRIGINE 25 MG/1
25 TABLET ORAL
Status: DISCONTINUED | OUTPATIENT
Start: 2023-05-05 | End: 2023-05-11 | Stop reason: HOSPADM

## 2023-05-05 RX ORDER — OMEGA-3-ACID ETHYL ESTERS 1 G/1
2 CAPSULE, LIQUID FILLED ORAL 2 TIMES DAILY
Status: DISCONTINUED | OUTPATIENT
Start: 2023-05-05 | End: 2023-05-05

## 2023-05-05 RX ORDER — INSULIN LISPRO 100 [IU]/ML
25 INJECTION, SOLUTION INTRAVENOUS; SUBCUTANEOUS
Status: DISCONTINUED | OUTPATIENT
Start: 2023-05-06 | End: 2023-05-06

## 2023-05-05 RX ORDER — FLUTICASONE FUROATE AND VILANTEROL 100; 25 UG/1; UG/1
1 POWDER RESPIRATORY (INHALATION) DAILY
Status: DISCONTINUED | OUTPATIENT
Start: 2023-05-06 | End: 2023-05-11 | Stop reason: HOSPADM

## 2023-05-05 RX ORDER — FLUTICASONE PROPIONATE AND SALMETEROL 100; 50 UG/1; UG/1
1 POWDER RESPIRATORY (INHALATION) EVERY 12 HOURS SCHEDULED
Status: DISCONTINUED | OUTPATIENT
Start: 2023-05-05 | End: 2023-05-05

## 2023-05-05 RX ORDER — IPRATROPIUM BROMIDE AND ALBUTEROL SULFATE 2.5; .5 MG/3ML; MG/3ML
3 SOLUTION RESPIRATORY (INHALATION)
Status: DISCONTINUED | OUTPATIENT
Start: 2023-05-05 | End: 2023-05-07

## 2023-05-05 RX ORDER — TIZANIDINE 2 MG/1
4 TABLET ORAL EVERY 8 HOURS PRN
Status: DISCONTINUED | OUTPATIENT
Start: 2023-05-05 | End: 2023-05-11 | Stop reason: HOSPADM

## 2023-05-05 RX ORDER — QUETIAPINE FUMARATE 100 MG/1
300 TABLET, FILM COATED ORAL
Status: DISCONTINUED | OUTPATIENT
Start: 2023-05-05 | End: 2023-05-11 | Stop reason: HOSPADM

## 2023-05-05 RX ORDER — INSULIN LISPRO 100 [IU]/ML
2-12 INJECTION, SOLUTION INTRAVENOUS; SUBCUTANEOUS
Status: DISCONTINUED | OUTPATIENT
Start: 2023-05-05 | End: 2023-05-06

## 2023-05-05 RX ORDER — PANTOPRAZOLE SODIUM 20 MG/1
20 TABLET, DELAYED RELEASE ORAL
Status: DISCONTINUED | OUTPATIENT
Start: 2023-05-06 | End: 2023-05-11 | Stop reason: HOSPADM

## 2023-05-05 RX ORDER — MELATONIN
1000 DAILY
Status: DISCONTINUED | OUTPATIENT
Start: 2023-05-06 | End: 2023-05-11 | Stop reason: HOSPADM

## 2023-05-05 RX ADMIN — CEFTRIAXONE 1000 MG: 1 INJECTION, SOLUTION INTRAVENOUS at 20:52

## 2023-05-05 RX ADMIN — INSULIN LISPRO 8 UNITS: 100 INJECTION, SOLUTION INTRAVENOUS; SUBCUTANEOUS at 22:39

## 2023-05-05 RX ADMIN — HYDROMORPHONE HYDROCHLORIDE 1 MG: 1 INJECTION, SOLUTION INTRAMUSCULAR; INTRAVENOUS; SUBCUTANEOUS at 17:25

## 2023-05-05 RX ADMIN — QUETIAPINE FUMARATE 300 MG: 100 TABLET ORAL at 22:39

## 2023-05-05 RX ADMIN — BUSPIRONE HYDROCHLORIDE 10 MG: 10 TABLET ORAL at 20:54

## 2023-05-05 RX ADMIN — IPRATROPIUM BROMIDE AND ALBUTEROL SULFATE 3 ML: .5; 3 SOLUTION RESPIRATORY (INHALATION) at 23:37

## 2023-05-05 RX ADMIN — TIZANIDINE 4 MG: 2 TABLET ORAL at 22:39

## 2023-05-05 RX ADMIN — APIXABAN 5 MG: 5 TABLET, FILM COATED ORAL at 20:50

## 2023-05-05 RX ADMIN — INSULIN GLARGINE 45 UNITS: 100 INJECTION, SOLUTION SUBCUTANEOUS at 22:38

## 2023-05-05 RX ADMIN — IPRATROPIUM BROMIDE AND ALBUTEROL SULFATE 3 ML: .5; 3 SOLUTION RESPIRATORY (INHALATION) at 20:10

## 2023-05-05 RX ADMIN — SODIUM CHLORIDE, SODIUM LACTATE, POTASSIUM CHLORIDE, AND CALCIUM CHLORIDE 1000 ML: .6; .31; .03; .02 INJECTION, SOLUTION INTRAVENOUS at 19:03

## 2023-05-05 RX ADMIN — GABAPENTIN 600 MG: 300 CAPSULE ORAL at 20:54

## 2023-05-05 RX ADMIN — PIPERACILLIN SODIUM AND TAZOBACTAM SODIUM 4.5 G: 4; .5 INJECTION, POWDER, LYOPHILIZED, FOR SOLUTION INTRAVENOUS at 17:25

## 2023-05-05 RX ADMIN — AZITHROMYCIN 500 MG: 500 INJECTION, POWDER, LYOPHILIZED, FOR SOLUTION INTRAVENOUS at 21:24

## 2023-05-05 RX ADMIN — GUAIFENESIN AND DEXTROMETHORPHAN 10 ML: 100; 10 SYRUP ORAL at 23:31

## 2023-05-05 RX ADMIN — GUAIFENESIN AND DEXTROMETHORPHAN 10 ML: 100; 10 SYRUP ORAL at 20:50

## 2023-05-05 RX ADMIN — SODIUM CHLORIDE 100 ML/HR: 0.9 INJECTION, SOLUTION INTRAVENOUS at 20:52

## 2023-05-05 RX ADMIN — LAMOTRIGINE 25 MG: 25 TABLET ORAL at 22:39

## 2023-05-05 NOTE — ASSESSMENT & PLAN NOTE
Pt has hx of mild to moderate SHAVONNE with good compliance to his Bipap with settings ( 12/5, 3L)     · Continue bipap

## 2023-05-05 NOTE — ASSESSMENT & PLAN NOTE
Hx of COPD and follows with Pulmonology outpt  Home O2 of 3L   Pt currently on 3L on admission with O2 sat > 90%       · Continue Breo Ellipta and duonebs q4 scheduled  · Continue Pulmicort every 12 hours  · Continue Albuterol as needed for SOB   · Titrate O2 as needed

## 2023-05-05 NOTE — ASSESSMENT & PLAN NOTE
Hx of CLL  Pt unable to relay history regarding time of diagnosis       · No treatment at the moment
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
## 2023-05-05 NOTE — ASSESSMENT & PLAN NOTE
Hx of CAD  Unknown history of cardiac stents   Home medications ASA, lipitor and metoprolol       - continue home medications

## 2023-05-05 NOTE — ASSESSMENT & PLAN NOTE
Lab Results   Component Value Date    HGBA1C 11 6 (H) 01/19/2023     Pt with hx on uncontrolled DM 2  Last A1c 11 6 in 1/23  Home medications include metformin, liraglutide, lantus 70U BID, Aspart 35U TID        · F/u Repeat A1c   · Lantus 40 BID and titrate up as needed   · Increased Lispro to 15 U TID with meals, titrate up as needed   · Continue to monitor glucose    · Continue metformin 1000 mg BID

## 2023-05-05 NOTE — ASSESSMENT & PLAN NOTE
Chronic, stable   Pt follows with pain management outpt and received medial branch block in 3/23       · Continue tizanidine as needed for muscle spasms   · Given 1 dose of 15 mg toradol on 5/7 and 5/8

## 2023-05-05 NOTE — ASSESSMENT & PLAN NOTE
Pt presents with generalized weakness for 1 week since he has been sick       · Fall precautions   · PT/OT   · Continue to monitor

## 2023-05-05 NOTE — ASSESSMENT & PLAN NOTE
Pt with hx of atrial fibrillation  Rate on admission 80       · Telemetry   · Continue metoprolol, eliquis and digoxin

## 2023-05-05 NOTE — ED PROVIDER NOTES
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 Signed by           Esvin Guajardo PA-C  05/05/23 5885

## 2023-05-05 NOTE — ASSESSMENT & PLAN NOTE
Acute    Evidenced by cough and congestion, supported by increasing WBC with unremarkable CXR  CT chest/abd done on 5/8 unremarkable  Completed 3-day course of azithromycin 500 mg  This morning WBC was 20 34     · Trend WBC  · Continue Ceftriaxone 1g daily     · Robitussin Dm for Cough    · Mucinex BID for congestion   · Mucomyst BID   · Incentive spirometry- flutter   · Currently on 3L (home O2)

## 2023-05-06 LAB
ALL TARGETS: NOT DETECTED
ANION GAP SERPL CALCULATED.3IONS-SCNC: 8 MMOL/L (ref 4–13)
BASOPHILS # BLD AUTO: 0.05 THOUSANDS/ÂΜL (ref 0–0.1)
BASOPHILS NFR BLD AUTO: 0 % (ref 0–1)
BUN SERPL-MCNC: 10 MG/DL (ref 5–25)
CALCIUM SERPL-MCNC: 8.6 MG/DL (ref 8.4–10.2)
CHLORIDE SERPL-SCNC: 100 MMOL/L (ref 96–108)
CO2 SERPL-SCNC: 28 MMOL/L (ref 21–32)
CREAT SERPL-MCNC: 0.63 MG/DL (ref 0.6–1.3)
EOSINOPHIL # BLD AUTO: 0.12 THOUSAND/ÂΜL (ref 0–0.61)
EOSINOPHIL NFR BLD AUTO: 1 % (ref 0–6)
ERYTHROCYTE [DISTWIDTH] IN BLOOD BY AUTOMATED COUNT: 12.7 % (ref 11.6–15.1)
GFR SERPL CREATININE-BSD FRML MDRD: 104 ML/MIN/1.73SQ M
GLUCOSE SERPL-MCNC: 129 MG/DL (ref 65–140)
GLUCOSE SERPL-MCNC: 144 MG/DL (ref 65–140)
GLUCOSE SERPL-MCNC: 177 MG/DL (ref 65–140)
GLUCOSE SERPL-MCNC: 206 MG/DL (ref 65–140)
GLUCOSE SERPL-MCNC: 228 MG/DL (ref 65–140)
HCT VFR BLD AUTO: 40.9 % (ref 36.5–49.3)
HGB BLD-MCNC: 14 G/DL (ref 12–17)
IMM GRANULOCYTES # BLD AUTO: 0.07 THOUSAND/UL (ref 0–0.2)
IMM GRANULOCYTES NFR BLD AUTO: 0 % (ref 0–2)
L PNEUMO1 AG UR QL IA.RAPID: NEGATIVE
LACTATE SERPL-SCNC: 1.7 MMOL/L (ref 0.5–2)
LACTATE SERPL-SCNC: 2.1 MMOL/L (ref 0.5–2)
LYMPHOCYTES # BLD AUTO: 12.47 THOUSANDS/ÂΜL (ref 0.6–4.47)
LYMPHOCYTES NFR BLD AUTO: 74 % (ref 14–44)
MAGNESIUM SERPL-MCNC: 1.8 MG/DL (ref 1.9–2.7)
MCH RBC QN AUTO: 32.1 PG (ref 26.8–34.3)
MCHC RBC AUTO-ENTMCNC: 34.2 G/DL (ref 31.4–37.4)
MCV RBC AUTO: 94 FL (ref 82–98)
MONOCYTES # BLD AUTO: 0.57 THOUSAND/ÂΜL (ref 0.17–1.22)
MONOCYTES NFR BLD AUTO: 3 % (ref 4–12)
NEUTROPHILS # BLD AUTO: 3.77 THOUSANDS/ÂΜL (ref 1.85–7.62)
NEUTS SEG NFR BLD AUTO: 22 % (ref 43–75)
NRBC BLD AUTO-RTO: 0 /100 WBCS
PLATELET # BLD AUTO: 181 THOUSANDS/UL (ref 149–390)
PMV BLD AUTO: 9.3 FL (ref 8.9–12.7)
POTASSIUM SERPL-SCNC: 4.1 MMOL/L (ref 3.5–5.3)
RBC # BLD AUTO: 4.36 MILLION/UL (ref 3.88–5.62)
S PNEUM AG UR QL: NEGATIVE
SODIUM SERPL-SCNC: 136 MMOL/L (ref 135–147)
WBC # BLD AUTO: 17.05 THOUSAND/UL (ref 4.31–10.16)

## 2023-05-06 RX ORDER — BENZONATATE 100 MG/1
100 CAPSULE ORAL 3 TIMES DAILY
Status: DISCONTINUED | OUTPATIENT
Start: 2023-05-06 | End: 2023-05-11 | Stop reason: HOSPADM

## 2023-05-06 RX ORDER — MUSCLE RUB CREAM 100; 150 MG/G; MG/G
CREAM TOPICAL 4 TIMES DAILY PRN
Status: DISCONTINUED | OUTPATIENT
Start: 2023-05-06 | End: 2023-05-11 | Stop reason: HOSPADM

## 2023-05-06 RX ORDER — GUAIFENESIN 600 MG/1
600 TABLET, EXTENDED RELEASE ORAL EVERY 12 HOURS SCHEDULED
Status: DISCONTINUED | OUTPATIENT
Start: 2023-05-06 | End: 2023-05-11 | Stop reason: HOSPADM

## 2023-05-06 RX ORDER — INSULIN LISPRO 100 [IU]/ML
15 INJECTION, SOLUTION INTRAVENOUS; SUBCUTANEOUS
Status: DISCONTINUED | OUTPATIENT
Start: 2023-05-07 | End: 2023-05-08

## 2023-05-06 RX ORDER — MAGNESIUM SULFATE 1 G/100ML
1 INJECTION INTRAVENOUS ONCE
Status: COMPLETED | OUTPATIENT
Start: 2023-05-06 | End: 2023-05-06

## 2023-05-06 RX ORDER — AZITHROMYCIN 250 MG/1
500 TABLET, FILM COATED ORAL EVERY 24 HOURS
Status: COMPLETED | OUTPATIENT
Start: 2023-05-06 | End: 2023-05-08

## 2023-05-06 RX ORDER — INSULIN LISPRO 100 [IU]/ML
18 INJECTION, SOLUTION INTRAVENOUS; SUBCUTANEOUS
Status: DISCONTINUED | OUTPATIENT
Start: 2023-05-06 | End: 2023-05-06

## 2023-05-06 RX ORDER — GUAIFENESIN/DEXTROMETHORPHAN 100-10MG/5
10 SYRUP ORAL EVERY 6 HOURS SCHEDULED
Status: DISCONTINUED | OUTPATIENT
Start: 2023-05-06 | End: 2023-05-06

## 2023-05-06 RX ORDER — MAGNESIUM SULFATE 1 G/100ML
1 INJECTION INTRAVENOUS ONCE
Status: DISCONTINUED | OUTPATIENT
Start: 2023-05-06 | End: 2023-05-07

## 2023-05-06 RX ADMIN — INSULIN LISPRO 18 UNITS: 100 INJECTION, SOLUTION INTRAVENOUS; SUBCUTANEOUS at 12:23

## 2023-05-06 RX ADMIN — CEFTRIAXONE 1000 MG: 1 INJECTION, SOLUTION INTRAVENOUS at 19:43

## 2023-05-06 RX ADMIN — ATORVASTATIN CALCIUM 20 MG: 20 TABLET, FILM COATED ORAL at 18:08

## 2023-05-06 RX ADMIN — TIZANIDINE 4 MG: 2 TABLET ORAL at 16:46

## 2023-05-06 RX ADMIN — OMEGA-3 FATTY ACIDS CAP 1000 MG 1000 MG: 1000 CAP at 09:15

## 2023-05-06 RX ADMIN — GABAPENTIN 600 MG: 300 CAPSULE ORAL at 18:08

## 2023-05-06 RX ADMIN — Medication 1 TABLET: at 09:14

## 2023-05-06 RX ADMIN — IPRATROPIUM BROMIDE AND ALBUTEROL SULFATE 3 ML: .5; 3 SOLUTION RESPIRATORY (INHALATION) at 23:00

## 2023-05-06 RX ADMIN — MAGNESIUM SULFATE HEPTAHYDRATE 1 G: 1 INJECTION, SOLUTION INTRAVENOUS at 11:32

## 2023-05-06 RX ADMIN — IPRATROPIUM BROMIDE AND ALBUTEROL SULFATE 3 ML: .5; 3 SOLUTION RESPIRATORY (INHALATION) at 18:22

## 2023-05-06 RX ADMIN — INSULIN GLARGINE 45 UNITS: 100 INJECTION, SOLUTION SUBCUTANEOUS at 09:12

## 2023-05-06 RX ADMIN — METFORMIN HYDROCHLORIDE 500 MG: 500 TABLET ORAL at 09:14

## 2023-05-06 RX ADMIN — LAMOTRIGINE 25 MG: 25 TABLET ORAL at 22:39

## 2023-05-06 RX ADMIN — QUETIAPINE FUMARATE 100 MG: 50 TABLET, EXTENDED RELEASE ORAL at 09:21

## 2023-05-06 RX ADMIN — TIZANIDINE 4 MG: 2 TABLET ORAL at 09:14

## 2023-05-06 RX ADMIN — INSULIN GLARGINE 45 UNITS: 100 INJECTION, SOLUTION SUBCUTANEOUS at 22:45

## 2023-05-06 RX ADMIN — SPIRONOLACTONE 25 MG: 25 TABLET ORAL at 09:17

## 2023-05-06 RX ADMIN — GUAIFENESIN 600 MG: 600 TABLET, EXTENDED RELEASE ORAL at 22:41

## 2023-05-06 RX ADMIN — DIGOXIN 250 MCG: 0.12 TABLET ORAL at 09:14

## 2023-05-06 RX ADMIN — GUAIFENESIN AND DEXTROMETHORPHAN 10 ML: 100; 10 SYRUP ORAL at 04:02

## 2023-05-06 RX ADMIN — METFORMIN HYDROCHLORIDE 1000 MG: 500 TABLET ORAL at 18:08

## 2023-05-06 RX ADMIN — INSULIN LISPRO 2 UNITS: 100 INJECTION, SOLUTION INTRAVENOUS; SUBCUTANEOUS at 09:11

## 2023-05-06 RX ADMIN — BISMUTH SUBSALICYLATE 524 MG: 525 LIQUID ORAL at 09:13

## 2023-05-06 RX ADMIN — OXYCODONE HYDROCHLORIDE AND ACETAMINOPHEN 1000 MG: 500 TABLET ORAL at 09:14

## 2023-05-06 RX ADMIN — GUAIFENESIN 600 MG: 600 TABLET, EXTENDED RELEASE ORAL at 14:04

## 2023-05-06 RX ADMIN — APIXABAN 5 MG: 5 TABLET, FILM COATED ORAL at 18:08

## 2023-05-06 RX ADMIN — IPRATROPIUM BROMIDE AND ALBUTEROL SULFATE 3 ML: .5; 3 SOLUTION RESPIRATORY (INHALATION) at 08:53

## 2023-05-06 RX ADMIN — AZITHROMYCIN MONOHYDRATE 500 MG: 250 TABLET ORAL at 19:20

## 2023-05-06 RX ADMIN — IPRATROPIUM BROMIDE AND ALBUTEROL SULFATE 3 ML: .5; 3 SOLUTION RESPIRATORY (INHALATION) at 03:44

## 2023-05-06 RX ADMIN — BISMUTH SUBSALICYLATE 524 MG: 525 LIQUID ORAL at 18:08

## 2023-05-06 RX ADMIN — CHOLECALCIFEROL TAB 25 MCG (1000 UNIT) 1000 UNITS: 25 TAB at 09:14

## 2023-05-06 RX ADMIN — BISMUTH SUBSALICYLATE 524 MG: 525 LIQUID ORAL at 12:23

## 2023-05-06 RX ADMIN — ASPIRIN 81 MG: 81 TABLET, COATED ORAL at 09:14

## 2023-05-06 RX ADMIN — GABAPENTIN 600 MG: 300 CAPSULE ORAL at 09:15

## 2023-05-06 RX ADMIN — BUSPIRONE HYDROCHLORIDE 10 MG: 10 TABLET ORAL at 18:08

## 2023-05-06 RX ADMIN — QUETIAPINE FUMARATE 300 MG: 100 TABLET ORAL at 22:39

## 2023-05-06 RX ADMIN — INSULIN LISPRO 25 UNITS: 100 INJECTION, SOLUTION INTRAVENOUS; SUBCUTANEOUS at 09:11

## 2023-05-06 RX ADMIN — IPRATROPIUM BROMIDE AND ALBUTEROL SULFATE 3 ML: .5; 3 SOLUTION RESPIRATORY (INHALATION) at 13:23

## 2023-05-06 RX ADMIN — BUSPIRONE HYDROCHLORIDE 10 MG: 10 TABLET ORAL at 09:14

## 2023-05-06 RX ADMIN — ACETAMINOPHEN 650 MG: 325 TABLET, FILM COATED ORAL at 20:40

## 2023-05-06 RX ADMIN — GABAPENTIN 600 MG: 300 CAPSULE ORAL at 20:37

## 2023-05-06 RX ADMIN — BENZONATATE 100 MG: 100 CAPSULE ORAL at 18:08

## 2023-05-06 RX ADMIN — PANTOPRAZOLE SODIUM 20 MG: 20 TABLET, DELAYED RELEASE ORAL at 05:57

## 2023-05-06 RX ADMIN — FLUTICASONE FUROATE AND VILANTEROL TRIFENATATE 1 PUFF: 100; 25 POWDER RESPIRATORY (INHALATION) at 09:18

## 2023-05-06 RX ADMIN — GUAIFENESIN AND DEXTROMETHORPHAN 10 ML: 100; 10 SYRUP ORAL at 09:14

## 2023-05-06 RX ADMIN — LOSARTAN POTASSIUM 50 MG: 50 TABLET, FILM COATED ORAL at 09:14

## 2023-05-06 RX ADMIN — APIXABAN 5 MG: 5 TABLET, FILM COATED ORAL at 09:14

## 2023-05-06 RX ADMIN — BENZONATATE 100 MG: 100 CAPSULE ORAL at 20:37

## 2023-05-06 RX ADMIN — METOPROLOL SUCCINATE 200 MG: 100 TABLET, EXTENDED RELEASE ORAL at 09:17

## 2023-05-06 RX ADMIN — SERTRALINE HYDROCHLORIDE 50 MG: 50 TABLET ORAL at 09:15

## 2023-05-06 NOTE — PLAN OF CARE
Problem: RESPIRATORY - ADULT  Goal: Achieves optimal ventilation and oxygenation  Description: INTERVENTIONS:  - Assess for changes in respiratory status  - Assess for changes in mentation and behavior  - Position to facilitate oxygenation and minimize respiratory effort  - Oxygen administered by appropriate delivery if ordered  - Initiate smoking cessation education as indicated  - Encourage broncho-pulmonary hygiene including cough, deep breathe, Incentive Spirometry  - Assess the need for suctioning and aspirate as needed  - Assess and instruct to report SOB or any respiratory difficulty  - Respiratory Therapy support as indicated  Outcome: Progressing     Problem: INFECTION - ADULT  Goal: Absence or prevention of progression during hospitalization  Description: INTERVENTIONS:  - Assess and monitor for signs and symptoms of infection  - Monitor lab/diagnostic results  - Monitor all insertion sites, i e  indwelling lines, tubes, and drains  - Monitor endotracheal if appropriate and nasal secretions for changes in amount and color  - Morris appropriate cooling/warming therapies per order  - Administer medications as ordered  - Instruct and encourage patient and family to use good hand hygiene technique  - Identify and instruct in appropriate isolation precautions for identified infection/condition  Outcome: Progressing  Goal: Absence of fever/infection during neutropenic period  Description: INTERVENTIONS:  - Monitor WBC    Outcome: Progressing     Problem: SAFETY ADULT  Goal: Patient will remain free of falls  Description: INTERVENTIONS:  - Educate patient/family on patient safety including physical limitations  - Instruct patient to call for assistance with activity   - Consult OT/PT to assist with strengthening/mobility   - Keep Call bell within reach  - Keep bed low and locked with side rails adjusted as appropriate  - Keep care items and personal belongings within reach  - Initiate and maintain comfort rounds  - Make Fall Risk Sign visible to staff  - Offer Toileting every 2 Hours, in advance of need  - Initiate/Maintain bed alarm  - Obtain necessary fall risk management equipment:   - Apply yellow socks and bracelet for high fall risk patients  - Consider moving patient to room near nurses station  Outcome: Progressing  Goal: Maintain or return to baseline ADL function  Description: INTERVENTIONS:  -  Assess patient's ability to carry out ADLs; assess patient's baseline for ADL function and identify physical deficits which impact ability to perform ADLs (bathing, care of mouth/teeth, toileting, grooming, dressing, etc )  - Assess/evaluate cause of self-care deficits   - Assess range of motion  - Assess patient's mobility; develop plan if impaired  - Assess patient's need for assistive devices and provide as appropriate  - Encourage maximum independence but intervene and supervise when necessary  - Involve family in performance of ADLs  - Assess for home care needs following discharge   - Consider OT consult to assist with ADL evaluation and planning for discharge  - Provide patient education as appropriate  Outcome: Progressing  Goal: Maintains/Returns to pre admission functional level  Description: INTERVENTIONS:  - Perform BMAT or MOVE assessment daily    - Set and communicate daily mobility goal to care team and patient/family/caregiver  - Collaborate with rehabilitation services on mobility goals if consulted  - Perform Range of Motion 3 times a day  - Reposition patient every 2 hours    - Dangle patient 3 times a day  - Stand patient 3 times a day  - Ambulate patient 3 times a day  - Out of bed to chair 3 times a day   - Out of bed for meals 3 times a day  - Out of bed for toileting  - Record patient progress and toleration of activity level   Outcome: Progressing     Problem: DISCHARGE PLANNING  Goal: Discharge to home or other facility with appropriate resources  Description: INTERVENTIONS:  - Identify barriers to discharge w/patient and caregiver  - Arrange for needed discharge resources and transportation as appropriate  - Identify discharge learning needs (meds, wound care, etc )  - Arrange for interpretive services to assist at discharge as needed  - Refer to Case Management Department for coordinating discharge planning if the patient needs post-hospital services based on physician/advanced practitioner order or complex needs related to functional status, cognitive ability, or social support system  Outcome: Progressing     Problem: Knowledge Deficit  Goal: Patient/family/caregiver demonstrates understanding of disease process, treatment plan, medications, and discharge instructions  Description: Complete learning assessment and assess knowledge base    Interventions:  - Provide teaching at level of understanding  - Provide teaching via preferred learning methods  Outcome: Progressing
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
## 2023-05-06 NOTE — PLAN OF CARE
Problem: INFECTION - ADULT  Goal: Absence of fever/infection during neutropenic period  Description: INTERVENTIONS:  - Monitor WBC    Outcome: Progressing     Problem: SAFETY ADULT  Goal: Maintains/Returns to pre admission functional level  Description: INTERVENTIONS:  - Perform BMAT or MOVE assessment daily    - Set and communicate daily mobility goal to care team and patient/family/caregiver  - Collaborate with rehabilitation services on mobility goals if consulted  - Perform Range of Motion 3 times a day  - Reposition patient every 2 hours    - Dangle patient 3 times a day  - Stand patient 3 times a day  - Ambulate patient 3 times a day  - Out of bed to chair 3 times a day   - Out of bed for meals 3 times a day  - Out of bed for toileting  - Record patient progress and toleration of activity level   Outcome: Progressing     Problem: DISCHARGE PLANNING  Goal: Discharge to home or other facility with appropriate resources  Description: INTERVENTIONS:  - Identify barriers to discharge w/patient and caregiver  - Arrange for needed discharge resources and transportation as appropriate  - Identify discharge learning needs (meds, wound care, etc )  - Arrange for interpretive services to assist at discharge as needed  - Refer to Case Management Department for coordinating discharge planning if the patient needs post-hospital services based on physician/advanced practitioner order or complex needs related to functional status, cognitive ability, or social support system  Outcome: Progressing

## 2023-05-06 NOTE — UTILIZATION REVIEW
NOTIFICATION OF INPATIENT ADMISSION   AUTHORIZATION REQUEST   SERVICING FACILITY:   [place] [address]  Tax ID: [id number]  NPI: [id number] ATTENDING PROVIDER:  Attending Name and NPI#: Sara Hernandez [[id number]]  Address: [address] [place] [address]  Phone: [phone]   ADMISSION INFORMATION:  Place of Service: Inpatient [address] [facility]  [address]  Place of Service Code: 21  Inpatient Admission Date/Time: 5/5/23  6:05 PM  Discharge Date/Time: No discharge date for patient encounter  Admitting Diagnosis Code/Description:  Pneumonia [J18 9]  COPD exacerbation ([facility] [address] ) [J44 1]  Sepsis without acute organ dysfunction, due to unspecified organism [facility]) [A41 9]     UTILIZATION REVIEW CONTACT:  Nathaly Boateng Utilization   Network Utilization Review Department  Phone: [phone]  Fax [phone]  Email: [email or web address]  Contact for approvals/pending authorizations, clinical reviews, and discharge  PHYSICIAN ADVISORY SERVICES:  Medical Necessity Denial & Cyna-yb-Osf[facility] Review  Phone: [phone]  Fax: [phone]  Email: [email or web address]

## 2023-05-06 NOTE — PROGRESS NOTES
Daily Progress Note - [facility] [address] [place] 60 y o  male MRN: [id number]  Unit/Bed#: [address] [facility]  Encounter: [id number]  Admitting Physician: Lavanda Kanner, DO  PCP: MANAS Payne  Date of Admission:  5/5/2023  4:36 PM    Summary:     IVFs:    Diet: Diet Campos/CHO Controlled; Consistent Carbohydrate Diet Level 3 (6 carb servings/90 grams CHO/meal)  VTE:  Apixaban (Eliquis)   Mechanical prophylaxis in place  Patient Centered Rounds: I have performed bedside rounds with nursing staff today  Specialists/Other Care Team Provider: none    LOS: 1 day(s)  Status: Inpatient   Disposition: The patient will continue to require additional inpatient hospital stay due to Pneumonia needing IV Abx with increased oxygen requirement  Code Status: Level 3 - DNAR and DNI    Assessment and Plan    * Pneumonia  Assessment & Plan   Pt found to have infiltrate on CXR  Final read pending on admission  Pt on 3L of O2 at home and on admission with O2 sat >90%  VSS, WBC 16 78, Lactic acid 2 2, procalcitonin  05,  ED Course: Zosyn 4 5 g  - CXR: No acute cardiopulmonary disease   - WBC 17 05 today, could be reactive after replenishment, will monitor     · Continue Ceftriaxone and azithromycin   · lactic acid normalized, 1 7 today  · Urine strep and legionella, sputum culture Pending  · Robitussin Dm for Cough    · Incentive spirometry  · Titrate O2 as needed, currently on 4L    Uncontrolled diabetes mellitus with hyperglycemia (HCC)  Assessment & Plan  Lab Results   Component Value Date    HGBA1C 11 6 (H) 01/19/2023     Pt with hx on uncontrolled DM 2  Last A1c 11 6 in 1/23  Home medications include metformin, liraglutide, lantus 70U BID, Aspart 35U TID   Glucose on admission 241       · F/u Repeat A1c   · Lantus 45BID and titrate up as needed   · Lispro 18U TID with meals, titrate up as needed   · Continue to monitor glucose    · Continue metformin 1000 mg BID    Chronic obstructive pulmonary disease, unspecified ([facility] [address] )  Assessment & Plan  Hx of COPD and follows with Pulmonology outpt  Home O2 of 3L   Pt currently on 3L on admission with O2 sat > 90%  · Continue Breo Ellipta and duonebs q4 scheduled  · Continue Albuterol as needed for SOB   · Titrate O2 as needed     Ambulatory dysfunction  Assessment & Plan  Pt uses scooter to ambulate     · Fall precautions  · PT/OT     Generalized weakness  Assessment & Plan  Pt presents with generalized weakness for 1 week since he has been sick  · Fall precautions   · PT/OT   · Continue to monitor     Chronic lymphocytic leukemia of B-cell type in remission [facility])  Assessment & Plan  Hx of CLL  Pt unable to relay history regarding time of diagnosis  · No treatment at the moment     Low back pain, unspecified  Assessment & Plan  Chronic, stable   Pt follows with pain management outpt and received medial branch block in 3/23  · Continue tizanidine as needed for muscle spasms     Paroxysmal atrial fibrillation (HCC)  Assessment & Plan  Pt with hx of atrial fibrillation  Rate on admission 80  · Telemetry   · Continue metoprolol, eliquis and digoxin     Bipolar disorder (HCC)  Assessment & Plan  Stable     · Continue home medications : sertraline, quetiapine, buspar and lamictal     Obstructive sleep apnea  Assessment & Plan  Pt has hx of mild to moderate SHAVONNE with good compliance to his Bipap with settings ( 12/5, 3L)     · Continue bipap     Essential hypertension  Assessment & Plan  BP on admission 159/84  · Continue aldactone, metoprolol and losartan daily  · Monitor VS     Esophageal reflux  Assessment & Plan  Stable     · Continue PPI     Coronary artery disease  Assessment & Plan  Hx of CAD  Unknown history of cardiac stents  Home medications ASA, lipitor and metoprolol       - continue home medications         Subjective:   Pt was seen and examined at the bedside, NAD  Pt still has SOB and cough about the same as yesterday   Pt wants to follow up with pulmonology and oncology for his COPD and leukemia because he cannot get to the appointments easily  Explained to the Pt that Hospital is for more acute problems and Pt needs to follow up as outpatient for his chronic condition  Pt did not have any other acute complaints  Review of Systems   Constitutional: Negative for chills and fever  HENT: Negative for ear pain and sore throat  Eyes: Negative for pain and visual disturbance  Respiratory: Positive for cough and shortness of breath  Cardiovascular: Negative for chest pain and palpitations  Gastrointestinal: Negative for abdominal pain, constipation, diarrhea, nausea and vomiting  Genitourinary: Negative for dysuria and hematuria  Musculoskeletal: Negative for arthralgias, back pain, neck pain and neck stiffness  Skin: Negative for color change and rash  Neurological: Negative for dizziness, seizures, syncope, weakness and headaches  Psychiatric/Behavioral: Negative for agitation, behavioral problems and confusion  The patient is not nervous/anxious  All other systems reviewed and are negative  Objective     Vitals:   Temp (24hrs), Av 8 °F (36 6 °C), Min:97 4 °F (36 3 °C), Max:98 3 °F (36 8 °C)    Temp:  [97 4 °F (36 3 °C)-98 3 °F (36 8 °C)] 97 6 °F (36 4 °C)  HR:  [66-82] 79  Resp:  [16-20] 18  BP: (127-159)/(79-89) 133/79  SpO2:  [96 %-99 %] 96 %  Body mass index is 35 79 kg/m²  Input and Output Summary (last 24 hours): Intake/Output Summary (Last 24 hours) at 2023 1611  Last data filed at 2023 0353  Gross per 24 hour   Intake 400 ml   Output 3400 ml   Net -3000 ml       Physical Exam:   Physical Exam  Vitals reviewed  Constitutional:       General: He is not in acute distress  Appearance: Normal appearance  He is obese  He is not ill-appearing  HENT:      Head: Normocephalic and atraumatic        Right Ear: External ear normal       Left Ear: External ear normal       Nose: Nose normal  No congestion or rhinorrhea  Mouth/Throat:      Mouth: Mucous membranes are moist    Eyes:      Extraocular Movements: Extraocular movements intact  Conjunctiva/sclera: Conjunctivae normal    Cardiovascular:      Rate and Rhythm: Normal rate and regular rhythm  Pulses: Normal pulses  Heart sounds: Normal heart sounds  No murmur heard  No gallop  Pulmonary:      Effort: Pulmonary effort is normal  No respiratory distress  Breath sounds: Normal breath sounds  No stridor  No wheezing, rhonchi or rales  Chest:      Chest wall: No tenderness  Abdominal:      General: Abdomen is flat  Bowel sounds are normal  There is no distension  Palpations: Abdomen is soft  Tenderness: There is no abdominal tenderness  There is no guarding  Musculoskeletal:         General: No swelling, tenderness or deformity  Normal range of motion  Cervical back: Normal range of motion and neck supple  Right lower leg: No edema  Left lower leg: No edema  Skin:     General: Skin is warm and dry  Capillary Refill: Capillary refill takes less than 2 seconds  Findings: No bruising, erythema, lesion or rash  Neurological:      General: No focal deficit present  Mental Status: He is alert and oriented to person, place, and time  Psychiatric:         Mood and Affect: Mood normal          Behavior: Behavior normal          Thought Content:  Thought content normal            Additional Data:     Labs:  Results from last 7 days   Lab Units 05/06/23  0452   WBC Thousand/uL 17 05*   HEMOGLOBIN g/dL 14 0   HEMATOCRIT % 40 9   PLATELETS Thousands/uL 181   NEUTROS PCT % 22*   LYMPHS PCT % 74*   MONOS PCT % 3*   EOS PCT % 1     Results from last 7 days   Lab Units 05/06/23  0452 05/05/23  1706   POTASSIUM mmol/L 4 1 4 4   CHLORIDE mmol/L 100 94*   CO2 mmol/L 28 26   BUN mg/dL 10 12   CREATININE mg/dL 0 63 0 69   CALCIUM mg/dL 8 6 8 3*   ALK PHOS U/L  --  77   ALT U/L  -- 26   AST U/L  --  26     Results from last 7 days   Lab Units 05/05/23  1706   INR  1 04     Results from last 7 days   Lab Units 05/06/23  1555 05/06/23  1115 05/06/23  0731 05/05/23 2122   POC GLUCOSE mg/dl 129 206* 177* 305*           * I Have Reviewed All Lab Data Listed Above  * Additional Pertinent Lab Tests Reviewed: All Labs Within Last 24 Hours Reviewed    Imaging:    Imaging Reports Reviewed Today Include: CXR: No acute cardiopulmonary disease  Imaging Personally Reviewed by Myself Includes:  none    Recent Cultures (last 7 days):     Results from last 7 days   Lab Units 05/05/23  2129 05/05/23  1707 05/05/23  1706   BLOOD CULTURE   --   --  Received in Microbiology Lab  Culture in Progress     GRAM STAIN RESULT   --  Gram positive rods*  --    LEGIONELLA URINARY ANTIGEN  Negative  --   --          Active Medications  Scheduled Meds:  Current Facility-Administered Medications   Medication Dose Route Frequency Provider Last Rate   • acetaminophen  650 mg Oral Q6H PRN Rashida Spencer MD     • apixaban  5 mg Oral BID Rashida Spencer MD     • vitamin C  1,000 mg Oral Daily Rashida Spencer MD     • aspirin  81 mg Oral Daily Rashida Spencer MD     • atorvastatin  20 mg Oral Daily With Chikis Granda MD     • azithromycin  500 mg Oral Q24H Ashwini Oliveira MD     • benzonatate  100 mg Oral TID Ashwini Oliveira MD     • bismuth subsalicylate  200 mg Oral 4x Daily Rashida Spencer MD     • busPIRone  10 mg Oral BID Rashida Spencer MD     • calcium carbonate  1,000 mg Oral Daily PRN Rashida Spencer MD     • cefTRIAXone  1,000 mg Intravenous Q24H Rashida Spencer MD 1,000 mg (05/05/23 2052)   • cholecalciferol  1,000 Units Oral Daily Rashida Spencer MD     • digoxin  250 mcg Oral Daily Rashida Spencer MD     • fish oil  1,000 mg Oral Daily Rashida Spencer MD     • Fluticasone Furoate-Vilanterol  1 puff Inhalation Daily Rashida Spencer MD • gabapentin  600 mg Oral TID Jessie Saeed MD     • guaiFENesin  600 mg Oral Q12H [address] Johnie Lackey MD     • insulin glargine  45 Units Subcutaneous BID Jessie Saeed MD     • insulin lispro  18 Units Subcutaneous TID With Meals Johnie Lackey MD     • ipratropium-albuterol  3 mL Nebulization Q4H Jessie Saeed MD     • lamoTRIgine  25 mg Oral HS Jessie Saeed MD     • losartan  50 mg Oral Daily Jessie Saeed MD     • magnesium sulfate  1 g Intravenous Once Johnie Lackey MD     • menthol-methyl salicylate   Apply externally 4x Daily PRN Johnie Lackey MD     • metFORMIN  1,000 mg Oral BID With Meals Johnie Lackey MD     • metoprolol succinate  200 mg Oral Daily Jessie Saeed MD     • multivitamin-minerals  1 tablet Oral Daily Jessie Saeed MD     • ondansetron  4 mg Intravenous Q6H PRN Jessie Saeed MD     • pantoprazole  20 mg Oral Early Morning Jessie Saeed MD     • QUEtiapine  100 mg Oral QAM Jessie Saeed MD     • QUEtiapine  300 mg Oral HS Jessie Saeed MD     • sertraline  50 mg Oral Daily Jessie Saeed MD     • spironolactone  25 mg Oral Daily Jessie Saeed MD     • tiZANidine  4 mg Oral Q8H PRN Jessie Saeed MD       Continuous Infusions:     PRN Meds:   acetaminophen, 650 mg, Q6H PRN  calcium carbonate, 1,000 mg, Daily PRN  menthol-methyl salicylate, , 4x Daily PRN  ondansetron, 4 mg, Q6H PRN  tiZANidine, 4 mg, Q8H PRN                Patient Information Sharing: With the consent of Jemma Taylor , their loved ones were notified today by inpatient team of the patient’s condition and current plan  All questions answered  Time Spent for Care: 30 minutes  More than 50% of total time spent on counseling and coordination of care as described above      Johnie Lackey MD  05/06/23  4:11 PM

## 2023-05-07 LAB
ANION GAP SERPL CALCULATED.3IONS-SCNC: 11 MMOL/L (ref 4–13)
ATRIAL RATE: 178 BPM
ATRIAL RATE: 66 BPM
BUN SERPL-MCNC: 8 MG/DL (ref 5–25)
CALCIUM SERPL-MCNC: 8.9 MG/DL (ref 8.4–10.2)
CHLORIDE SERPL-SCNC: 99 MMOL/L (ref 96–108)
CO2 SERPL-SCNC: 26 MMOL/L (ref 21–32)
CREAT SERPL-MCNC: 0.65 MG/DL (ref 0.6–1.3)
ERYTHROCYTE [DISTWIDTH] IN BLOOD BY AUTOMATED COUNT: 12.7 % (ref 11.6–15.1)
EST. AVERAGE GLUCOSE BLD GHB EST-MCNC: 217 MG/DL
GFR SERPL CREATININE-BSD FRML MDRD: 103 ML/MIN/1.73SQ M
GLUCOSE SERPL-MCNC: 134 MG/DL (ref 65–140)
GLUCOSE SERPL-MCNC: 138 MG/DL (ref 65–140)
GLUCOSE SERPL-MCNC: 208 MG/DL (ref 65–140)
GLUCOSE SERPL-MCNC: 253 MG/DL (ref 65–140)
HBA1C MFR BLD: 9.2 %
HCT VFR BLD AUTO: 41 % (ref 36.5–49.3)
HGB BLD-MCNC: 14.1 G/DL (ref 12–17)
MAGNESIUM SERPL-MCNC: 1.9 MG/DL (ref 1.9–2.7)
MCH RBC QN AUTO: 32.7 PG (ref 26.8–34.3)
MCHC RBC AUTO-ENTMCNC: 34.4 G/DL (ref 31.4–37.4)
MCV RBC AUTO: 95 FL (ref 82–98)
PLATELET # BLD AUTO: 152 THOUSANDS/UL (ref 149–390)
PMV BLD AUTO: 10.4 FL (ref 8.9–12.7)
POTASSIUM SERPL-SCNC: 4 MMOL/L (ref 3.5–5.3)
QRS AXIS: 79 DEGREES
QRS AXIS: 80 DEGREES
QRS AXIS: 81 DEGREES
QRSD INTERVAL: 88 MS
QRSD INTERVAL: 88 MS
QRSD INTERVAL: 90 MS
QT INTERVAL: 380 MS
QT INTERVAL: 384 MS
QT INTERVAL: 388 MS
QTC INTERVAL: 411 MS
QTC INTERVAL: 430 MS
QTC INTERVAL: 438 MS
RBC # BLD AUTO: 4.31 MILLION/UL (ref 3.88–5.62)
SODIUM SERPL-SCNC: 136 MMOL/L (ref 135–147)
T WAVE AXIS: 17 DEGREES
T WAVE AXIS: 27 DEGREES
T WAVE AXIS: 46 DEGREES
VENTRICULAR RATE: 69 BPM
VENTRICULAR RATE: 74 BPM
VENTRICULAR RATE: 80 BPM
WBC # BLD AUTO: 17.57 THOUSAND/UL (ref 4.31–10.16)

## 2023-05-07 RX ORDER — IPRATROPIUM BROMIDE AND ALBUTEROL SULFATE 2.5; .5 MG/3ML; MG/3ML
3 SOLUTION RESPIRATORY (INHALATION)
Status: DISCONTINUED | OUTPATIENT
Start: 2023-05-07 | End: 2023-05-07

## 2023-05-07 RX ORDER — MAGNESIUM SULFATE 1 G/100ML
1 INJECTION INTRAVENOUS ONCE
Status: COMPLETED | OUTPATIENT
Start: 2023-05-07 | End: 2023-05-07

## 2023-05-07 RX ORDER — KETOROLAC TROMETHAMINE 30 MG/ML
15 INJECTION, SOLUTION INTRAMUSCULAR; INTRAVENOUS ONCE AS NEEDED
Status: COMPLETED | OUTPATIENT
Start: 2023-05-07 | End: 2023-05-07

## 2023-05-07 RX ORDER — IPRATROPIUM BROMIDE AND ALBUTEROL SULFATE 2.5; .5 MG/3ML; MG/3ML
3 SOLUTION RESPIRATORY (INHALATION)
Status: DISCONTINUED | OUTPATIENT
Start: 2023-05-08 | End: 2023-05-08

## 2023-05-07 RX ORDER — ACETYLCYSTEINE 200 MG/ML
3 SOLUTION ORAL; RESPIRATORY (INHALATION) ONCE
Status: COMPLETED | OUTPATIENT
Start: 2023-05-07 | End: 2023-05-07

## 2023-05-07 RX ORDER — ATORVASTATIN CALCIUM 40 MG/1
40 TABLET, FILM COATED ORAL
Status: DISCONTINUED | OUTPATIENT
Start: 2023-05-07 | End: 2023-05-11 | Stop reason: HOSPADM

## 2023-05-07 RX ADMIN — CEFTRIAXONE 1000 MG: 1 INJECTION, SOLUTION INTRAVENOUS at 19:23

## 2023-05-07 RX ADMIN — ASPIRIN 81 MG: 81 TABLET, COATED ORAL at 08:29

## 2023-05-07 RX ADMIN — LOSARTAN POTASSIUM 50 MG: 50 TABLET, FILM COATED ORAL at 08:28

## 2023-05-07 RX ADMIN — METFORMIN HYDROCHLORIDE 1000 MG: 500 TABLET ORAL at 08:28

## 2023-05-07 RX ADMIN — INSULIN GLARGINE 45 UNITS: 100 INJECTION, SOLUTION SUBCUTANEOUS at 08:29

## 2023-05-07 RX ADMIN — QUETIAPINE FUMARATE 100 MG: 50 TABLET, EXTENDED RELEASE ORAL at 08:30

## 2023-05-07 RX ADMIN — QUETIAPINE FUMARATE 300 MG: 100 TABLET ORAL at 21:30

## 2023-05-07 RX ADMIN — BUSPIRONE HYDROCHLORIDE 10 MG: 10 TABLET ORAL at 15:52

## 2023-05-07 RX ADMIN — GABAPENTIN 600 MG: 300 CAPSULE ORAL at 08:28

## 2023-05-07 RX ADMIN — INSULIN GLARGINE 45 UNITS: 100 INJECTION, SOLUTION SUBCUTANEOUS at 22:27

## 2023-05-07 RX ADMIN — GUAIFENESIN 600 MG: 600 TABLET, EXTENDED RELEASE ORAL at 08:28

## 2023-05-07 RX ADMIN — IPRATROPIUM BROMIDE AND ALBUTEROL SULFATE 3 ML: .5; 3 SOLUTION RESPIRATORY (INHALATION) at 15:12

## 2023-05-07 RX ADMIN — GUAIFENESIN 600 MG: 600 TABLET, EXTENDED RELEASE ORAL at 21:29

## 2023-05-07 RX ADMIN — METFORMIN HYDROCHLORIDE 1000 MG: 500 TABLET ORAL at 15:51

## 2023-05-07 RX ADMIN — OMEGA-3 FATTY ACIDS CAP 1000 MG 1000 MG: 1000 CAP at 08:28

## 2023-05-07 RX ADMIN — IPRATROPIUM BROMIDE AND ALBUTEROL SULFATE 3 ML: .5; 3 SOLUTION RESPIRATORY (INHALATION) at 11:08

## 2023-05-07 RX ADMIN — FLUTICASONE FUROATE AND VILANTEROL TRIFENATATE 1 PUFF: 100; 25 POWDER RESPIRATORY (INHALATION) at 08:29

## 2023-05-07 RX ADMIN — IPRATROPIUM BROMIDE AND ALBUTEROL SULFATE 3 ML: .5; 3 SOLUTION RESPIRATORY (INHALATION) at 07:14

## 2023-05-07 RX ADMIN — DIGOXIN 250 MCG: 0.12 TABLET ORAL at 08:28

## 2023-05-07 RX ADMIN — GABAPENTIN 600 MG: 300 CAPSULE ORAL at 15:52

## 2023-05-07 RX ADMIN — APIXABAN 5 MG: 5 TABLET, FILM COATED ORAL at 15:51

## 2023-05-07 RX ADMIN — GABAPENTIN 600 MG: 300 CAPSULE ORAL at 21:30

## 2023-05-07 RX ADMIN — SERTRALINE HYDROCHLORIDE 50 MG: 50 TABLET ORAL at 08:28

## 2023-05-07 RX ADMIN — INSULIN LISPRO 15 UNITS: 100 INJECTION, SOLUTION INTRAVENOUS; SUBCUTANEOUS at 17:34

## 2023-05-07 RX ADMIN — Medication 1 TABLET: at 08:28

## 2023-05-07 RX ADMIN — ATORVASTATIN CALCIUM 40 MG: 40 TABLET, FILM COATED ORAL at 15:52

## 2023-05-07 RX ADMIN — KETOROLAC TROMETHAMINE 15 MG: 30 INJECTION, SOLUTION INTRAMUSCULAR at 10:15

## 2023-05-07 RX ADMIN — MAGNESIUM SULFATE HEPTAHYDRATE 1 G: 1 INJECTION, SOLUTION INTRAVENOUS at 11:58

## 2023-05-07 RX ADMIN — TIZANIDINE 4 MG: 2 TABLET ORAL at 21:33

## 2023-05-07 RX ADMIN — OXYCODONE HYDROCHLORIDE AND ACETAMINOPHEN 1000 MG: 500 TABLET ORAL at 08:28

## 2023-05-07 RX ADMIN — LAMOTRIGINE 25 MG: 25 TABLET ORAL at 21:30

## 2023-05-07 RX ADMIN — IPRATROPIUM BROMIDE AND ALBUTEROL SULFATE 3 ML: .5; 3 SOLUTION RESPIRATORY (INHALATION) at 19:34

## 2023-05-07 RX ADMIN — SPIRONOLACTONE 25 MG: 25 TABLET ORAL at 08:28

## 2023-05-07 RX ADMIN — BUSPIRONE HYDROCHLORIDE 10 MG: 10 TABLET ORAL at 08:28

## 2023-05-07 RX ADMIN — BENZONATATE 100 MG: 100 CAPSULE ORAL at 21:30

## 2023-05-07 RX ADMIN — BISMUTH SUBSALICYLATE 524 MG: 525 LIQUID ORAL at 15:51

## 2023-05-07 RX ADMIN — BENZONATATE 100 MG: 100 CAPSULE ORAL at 08:28

## 2023-05-07 RX ADMIN — INSULIN LISPRO 15 UNITS: 100 INJECTION, SOLUTION INTRAVENOUS; SUBCUTANEOUS at 12:41

## 2023-05-07 RX ADMIN — METOPROLOL SUCCINATE 200 MG: 100 TABLET, EXTENDED RELEASE ORAL at 08:28

## 2023-05-07 RX ADMIN — AZITHROMYCIN MONOHYDRATE 500 MG: 250 TABLET ORAL at 17:33

## 2023-05-07 RX ADMIN — ACETYLCYSTEINE 600 MG: 200 SOLUTION ORAL; RESPIRATORY (INHALATION) at 11:08

## 2023-05-07 RX ADMIN — PANTOPRAZOLE SODIUM 20 MG: 20 TABLET, DELAYED RELEASE ORAL at 06:25

## 2023-05-07 RX ADMIN — APIXABAN 5 MG: 5 TABLET, FILM COATED ORAL at 08:28

## 2023-05-07 RX ADMIN — BISMUTH SUBSALICYLATE 524 MG: 525 LIQUID ORAL at 08:29

## 2023-05-07 RX ADMIN — BENZONATATE 100 MG: 100 CAPSULE ORAL at 15:52

## 2023-05-07 RX ADMIN — IPRATROPIUM BROMIDE AND ALBUTEROL SULFATE 3 ML: .5; 3 SOLUTION RESPIRATORY (INHALATION) at 03:00

## 2023-05-07 RX ADMIN — CHOLECALCIFEROL TAB 25 MCG (1000 UNIT) 1000 UNITS: 25 TAB at 08:29

## 2023-05-07 RX ADMIN — TIZANIDINE 4 MG: 2 TABLET ORAL at 08:28

## 2023-05-07 RX ADMIN — BISMUTH SUBSALICYLATE 524 MG: 525 LIQUID ORAL at 12:40

## 2023-05-07 NOTE — PLAN OF CARE
Problem: PHYSICAL THERAPY ADULT  Goal: Performs mobility at highest level of function for planned discharge setting  See evaluation for individualized goals  Description: Treatment/Interventions: Therapeutic exercise, Endurance training, Bed mobility, Gait training, Equipment eval/education, Patient/family training  Equipment Recommended:  (pt has a walker, cane, scooter)       See flowsheet documentation for full assessment, interventions and recommendations  Note: Prognosis: Good  Problem List: Decreased endurance, Impaired balance, Decreased mobility  Assessment: Patient is an 60y o  year old male seen for Physical Therapy evaluation  Patient admitted with Pneumonia  Comorbidities affecting patient's physical performance include: DM, bipolar disorder, afib, ambulatory dysfunction, afib, chronic pain, obesity  Personal factors affecting patient at time of initial evaluation include: ambulating with assistive device and inability to ambulate household distances  Prior to admission, patient was independent with functional mobility with walker or cane and requiring assist for ADLS  Please find objective findings from Physical Therapy assessment regarding body systems outlined above with impairments and limitations including decreased endurance, pain, decreased activity tolerance and decreased functional mobility tolerance  The Barthel Index was used as a functional outcome tool presenting with a score of Barthel Index Score: 60 today indicating moderate limitations of functional mobility and ADLS  Patient's clinical presentation is currently unstable/unpredictable as seen in patient's presentation of changing level of pain, new onset of impairment of functional mobility and decreased endurance  Pt would benefit from continued Physical Therapy treatment to address deficits as defined above and maximize level of functional mobility   As demonstrated by objective findings, the assigned level of complexity for this evaluation is high  The patient's AM-PAC Basic Mobility Inpatient Short Form Raw Score is 21  A Raw score of greater than 16 suggests the patient may benefit from discharge to home  PT Discharge Recommendation: Home with home health rehabilitation    See flowsheet documentation for full assessment

## 2023-05-07 NOTE — PLAN OF CARE
Problem: RESPIRATORY - ADULT  Goal: Achieves optimal ventilation and oxygenation  Description: INTERVENTIONS:  - Assess for changes in respiratory status  - Assess for changes in mentation and behavior  - Position to facilitate oxygenation and minimize respiratory effort  - Oxygen administered by appropriate delivery if ordered  - Initiate smoking cessation education as indicated  - Encourage broncho-pulmonary hygiene including cough, deep breathe, Incentive Spirometry  - Assess the need for suctioning and aspirate as needed  - Assess and instruct to report SOB or any respiratory difficulty  - Respiratory Therapy support as indicated  Outcome: Progressing     Problem: INFECTION - ADULT  Goal: Absence or prevention of progression during hospitalization  Description: INTERVENTIONS:  - Assess and monitor for signs and symptoms of infection  - Monitor lab/diagnostic results  - Monitor all insertion sites, i e  indwelling lines, tubes, and drains  - Monitor endotracheal if appropriate and nasal secretions for changes in amount and color  - Watson appropriate cooling/warming therapies per order  - Administer medications as ordered  - Instruct and encourage patient and family to use good hand hygiene technique  - Identify and instruct in appropriate isolation precautions for identified infection/condition  Outcome: Progressing  Goal: Absence of fever/infection during neutropenic period  Description: INTERVENTIONS:  - Monitor WBC    Outcome: Progressing     Problem: SAFETY ADULT  Goal: Patient will remain free of falls  Description: INTERVENTIONS:  - Educate patient/family on patient safety including physical limitations  - Instruct patient to call for assistance with activity   - Consult OT/PT to assist with strengthening/mobility   - Keep Call bell within reach  - Keep bed low and locked with side rails adjusted as appropriate  - Keep care items and personal belongings within reach  - Initiate and maintain comfort rounds  - Make Fall Risk Sign visible to staff  - Offer Toileting every 2 Hours, in advance of need  - Initiate/Maintain bed alarm  - Obtain necessary fall risk management equipment:   - Apply yellow socks and bracelet for high fall risk patients  - Consider moving patient to room near nurses station  Outcome: Progressing  Goal: Maintain or return to baseline ADL function  Description: INTERVENTIONS:  -  Assess patient's ability to carry out ADLs; assess patient's baseline for ADL function and identify physical deficits which impact ability to perform ADLs (bathing, care of mouth/teeth, toileting, grooming, dressing, etc )  - Assess/evaluate cause of self-care deficits   - Assess range of motion  - Assess patient's mobility; develop plan if impaired  - Assess patient's need for assistive devices and provide as appropriate  - Encourage maximum independence but intervene and supervise when necessary  - Involve family in performance of ADLs  - Assess for home care needs following discharge   - Consider OT consult to assist with ADL evaluation and planning for discharge  - Provide patient education as appropriate  Outcome: Progressing  Goal: Maintains/Returns to pre admission functional level  Description: INTERVENTIONS:  - Perform BMAT or MOVE assessment daily    - Set and communicate daily mobility goal to care team and patient/family/caregiver  - Collaborate with rehabilitation services on mobility goals if consulted  - Perform Range of Motion 3 times a day  - Reposition patient every 2 hours    - Dangle patient 3 times a day  - Stand patient 3 times a day  - Ambulate patient 3 times a day  - Out of bed to chair 3 times a day   - Out of bed for meals 3 times a day  - Out of bed for toileting  - Record patient progress and toleration of activity level   Outcome: Progressing     Problem: DISCHARGE PLANNING  Goal: Discharge to home or other facility with appropriate resources  Description: INTERVENTIONS:  - Identify barriers to discharge w/patient and caregiver  - Arrange for needed discharge resources and transportation as appropriate  - Identify discharge learning needs (meds, wound care, etc )  - Arrange for interpretive services to assist at discharge as needed  - Refer to Case Management Department for coordinating discharge planning if the patient needs post-hospital services based on physician/advanced practitioner order or complex needs related to functional status, cognitive ability, or social support system  Outcome: Progressing     Problem: Knowledge Deficit  Goal: Patient/family/caregiver demonstrates understanding of disease process, treatment plan, medications, and discharge instructions  Description: Complete learning assessment and assess knowledge base    Interventions:  - Provide teaching at level of understanding  - Provide teaching via preferred learning methods  Outcome: Progressing     Problem: MOBILITY - ADULT  Goal: Maintain or return to baseline ADL function  Description: INTERVENTIONS:  -  Assess patient's ability to carry out ADLs; assess patient's baseline for ADL function and identify physical deficits which impact ability to perform ADLs (bathing, care of mouth/teeth, toileting, grooming, dressing, etc )  - Assess/evaluate cause of self-care deficits   - Assess range of motion  - Assess patient's mobility; develop plan if impaired  - Assess patient's need for assistive devices and provide as appropriate  - Encourage maximum independence but intervene and supervise when necessary  - Involve family in performance of ADLs  - Assess for home care needs following discharge   - Consider OT consult to assist with ADL evaluation and planning for discharge  - Provide patient education as appropriate  Outcome: Progressing  Goal: Maintains/Returns to pre admission functional level  Description: INTERVENTIONS:  - Perform BMAT or MOVE assessment daily    - Set and communicate daily mobility goal to care team and patient/family/caregiver  - Collaborate with rehabilitation services on mobility goals if consulted  - Perform Range of Motion 3 times a day  - Reposition patient every 2 hours    - Dangle patient 3 times a day  - Stand patient 3 times a day  - Ambulate patient 3 times a day  - Out of bed to chair 3 times a day   - Out of bed for meals 3 times a day  - Out of bed for toileting  - Record patient progress and toleration of activity level   Outcome: Progressing

## 2023-05-07 NOTE — PHYSICAL THERAPY NOTE
"       PHYSICAL THERAPY EVALUATION/TREATMENT     05/07/23 900   Note Type   Note type Evaluation   Pain Assessment   Pain Assessment Tool 0-10   Pain Score 7   Pain Location/Orientation Location: Back   Restrictions/Precautions   Other Precautions O2   Home Living   Type of Home Apartment   Home Layout One level;Elevator   Home Equipment Walker;Cane;Electric scooter   Prior Function   Level of [place] Independent with functional mobility  (Pt ambulates short distances with a walker or cane, uses his scooter for longer distances)   Lives With Alone   Receives Help From Home health  (5x/week for 3-4 hours)   Falls in the last 6 months 0   General   Additional Pertinent History Pt admitted with SOB/weakness with diagnosis of PNA  Pt reports he feels a little better since admission  Cognition   Overall Cognitive Status WFL   Arousal/Participation Alert   Orientation Level Oriented X4   Subjective   Subjective \"I just want to lay in bed\"   RUE Assessment   RUE Assessment WFL   LUE Assessment   LUE Assessment WFL   RLE Assessment   RLE Assessment WFL   LLE Assessment   LLE Assessment WFL   Bed Mobility   Rolling R 7  Independent   Supine to Sit 5  Supervision   Sit to Supine 5  Supervision   Transfers   Sit to Stand 5  Supervision   Stand to Sit 5  Supervision   Stand pivot 5  Supervision   Additional items   (with walker)   Ambulation/Elevation   Gait pattern   (slow christina, short step length)   Gait Assistance 5  Supervision   Assistive Device Rolling walker  (3L02, cues to keep 02 on to walk)   Distance 15 feet   Balance   Static Sitting Good   Dynamic Sitting Fair +   Static Standing Fair +   Dynamic Standing Fair   Ambulatory Fair   Activity Tolerance   Activity Tolerance Patient limited by pain; Patient limited by fatigue   Assessment   Prognosis Good   Problem List Decreased endurance; Impaired balance;Decreased mobility   Assessment Patient is an 60y o  year old male seen for Physical Therapy evaluation   " "Patient admitted with Pneumonia  Comorbidities affecting patient's physical performance include: DM, bipolar disorder, afib, ambulatory dysfunction, afib, chronic pain, obesity  Personal factors affecting patient at time of initial evaluation include: ambulating with assistive device and inability to ambulate household distances  Prior to admission, patient was independent with functional mobility with walker or cane and requiring assist for ADLS  Please find objective findings from Physical Therapy assessment regarding body systems outlined above with impairments and limitations including decreased endurance, pain, decreased activity tolerance and decreased functional mobility tolerance  The Barthel Index was used as a functional outcome tool presenting with a score of Barthel Index Score: 60 today indicating moderate limitations of functional mobility and ADLS  Patient's clinical presentation is currently unstable/unpredictable as seen in patient's presentation of changing level of pain, new onset of impairment of functional mobility and decreased endurance  Pt would benefit from continued Physical Therapy treatment to address deficits as defined above and maximize level of functional mobility  As demonstrated by objective findings, the assigned level of complexity for this evaluation is high  The patient's AM-[facility] Basic Mobility Inpatient Short Form Raw Score is 21  A Raw score of greater than 16 suggests the patient may benefit from discharge to home     Goals   Patient Goals \"less pain\"   STG Expiration Date 05/14/23   Short Term Goal #1 Independent bed mobility, independent transfers, independent ambulation indoor level surfaces with a rolling walker 40 feet within normal steady gait the patient can negotiate his apartment   LTG Expiration Date 05/21/23   Long Term Goal #1 Improve standing dynamic balance to at least fair plus to decrease fall risk, patient will ambulate in his apartment with least " "restrictive device x75 feet without SOB so pt can begin to perform some cardiovascular exercise  Plan   Treatment/Interventions Therapeutic exercise; Endurance training;Bed mobility;Gait training;Equipment eval/education;Patient/family training   PT Frequency 3-5x/wk   Recommendation   PT Discharge Recommendation Home with home health rehabilitation   Equipment Recommended   (pt has a walker, cane, scooter)   AM-PAC Basic Mobility Inpatient   Turning in Flat Bed Without Bedrails 4   Lying on Back to Sitting on Edge of Flat Bed Without Bedrails 4   Moving Bed to Chair 4   Standing Up From Chair Using Arms 4   Walk in Room 3   Climb 3-5 Stairs With Railing 2   Basic Mobility Inpatient Raw Score 21   Basic Mobility Standardized Score 45 55   Highest Level Of Mobility   JH-HLM Goal 6: Walk 10 steps or more   JH-HLM Achieved 7: Walk 25 feet or more   Barthel Index   Feeding 10   Bathing 0   Grooming Score 0   Dressing Score 5   Bladder Score 10   Bowels Score 10   Toilet Use Score 5   Transfers (Bed/Chair) Score 15   Mobility (Level Surface) Score 0   Stairs Score 5   Barthel Index Score 60   Additional Treatment Session   Start Time 0850   End Time 0900   Treatment Assessment S:  \"I guess walking is good for me  \"  O:  Pt ambulated in hallway with a walker and 3L02 with encouragement  Pt ambulated x 50 feet with supervision/modified independent assist   Pt encouraged to sit up OOB but requested to go back to bed  A:  Pt demonstrates a slow steady gait with a walker but has limited activity tolerance  P: Encourage OOB, ambulation, home PT     Licensure   [place] License Number  Eris Ya PT [id number]     "

## 2023-05-07 NOTE — PLAN OF CARE
Problem: RESPIRATORY - ADULT  Goal: Achieves optimal ventilation and oxygenation  Description: INTERVENTIONS:  - Assess for changes in respiratory status  - Assess for changes in mentation and behavior  - Position to facilitate oxygenation and minimize respiratory effort  - Oxygen administered by appropriate delivery if ordered  - Initiate smoking cessation education as indicated  - Encourage broncho-pulmonary hygiene including cough, deep breathe, Incentive Spirometry  - Assess the need for suctioning and aspirate as needed  - Assess and instruct to report SOB or any respiratory difficulty  - Respiratory Therapy support as indicated  Outcome: Progressing     Problem: INFECTION - ADULT  Goal: Absence or prevention of progression during hospitalization  Description: INTERVENTIONS:  - Assess and monitor for signs and symptoms of infection  - Monitor lab/diagnostic results  - Monitor all insertion sites, i e  indwelling lines, tubes, and drains  - Monitor endotracheal if appropriate and nasal secretions for changes in amount and color  - [place] appropriate cooling/warming therapies per order  - Administer medications as ordered  - Instruct and encourage patient and family to use good hand hygiene technique  - Identify and instruct in appropriate isolation precautions for identified infection/condition  Outcome: Progressing  Goal: Absence of fever/infection during neutropenic period  Description: INTERVENTIONS:  - Monitor WBC    Outcome: Progressing     Problem: SAFETY ADULT  Goal: Patient will remain free of falls  Description: INTERVENTIONS:  - Educate patient/family on patient safety including physical limitations  - Instruct patient to call for assistance with activity   - Consult OT/PT to assist with strengthening/mobility   - Keep Call bell within reach  - Keep bed low and locked with side rails adjusted as appropriate  - Keep care items and personal belongings within reach  - Initiate and maintain comfort rounds  - Make Fall Risk Sign visible to staff  - Offer Toileting every 2 Hours, in advance of need  - Initiate/Maintain bed alarm  - Obtain necessary fall risk management equipment:   - Apply yellow socks and bracelet for high fall risk patients  - Consider moving patient to room near nurses station  Outcome: Progressing  Goal: Maintain or return to baseline ADL function  Description: INTERVENTIONS:  -  Assess patient's ability to carry out ADLs; assess patient's baseline for ADL function and identify physical deficits which impact ability to perform ADLs (bathing, care of mouth/teeth, toileting, grooming, dressing, etc )  - Assess/evaluate cause of self-care deficits   - Assess range of motion  - Assess patient's mobility; develop plan if impaired  - Assess patient's need for assistive devices and provide as appropriate  - Encourage maximum independence but intervene and supervise when necessary  - Involve family in performance of ADLs  - Assess for home care needs following discharge   - Consider OT consult to assist with ADL evaluation and planning for discharge  - Provide patient education as appropriate  Outcome: Progressing  Goal: Maintains/Returns to pre admission functional level  Description: INTERVENTIONS:  - Perform BMAT or MOVE assessment daily    - Set and communicate daily mobility goal to care team and patient/family/caregiver  - Collaborate with rehabilitation services on mobility goals if consulted  - Perform Range of Motion 3 times a day  - Reposition patient every 2 hours    - Dangle patient 3 times a day  - Stand patient 3 times a day  - Ambulate patient 3 times a day  - Out of bed to chair 3 times a day   - Out of bed for meals 3 times a day  - Out of bed for toileting  - Record patient progress and toleration of activity level   Outcome: Progressing     Problem: DISCHARGE PLANNING  Goal: Discharge to home or other facility with appropriate resources  Description: INTERVENTIONS:  - Identify barriers to discharge w/patient and caregiver  - Arrange for needed discharge resources and transportation as appropriate  - Identify discharge learning needs (meds, wound care, etc )  - Arrange for interpretive services to assist at discharge as needed  - Refer to Case Management Department for coordinating discharge planning if the patient needs post-hospital services based on physician/advanced practitioner order or complex needs related to functional status, cognitive ability, or social support system  Outcome: Progressing     Problem: Knowledge Deficit  Goal: Patient/family/caregiver demonstrates understanding of disease process, treatment plan, medications, and discharge instructions  Description: Complete learning assessment and assess knowledge base    Interventions:  - Provide teaching at level of understanding  - Provide teaching via preferred learning methods  Outcome: Progressing     Problem: MOBILITY - ADULT  Goal: Maintain or return to baseline ADL function  Description: INTERVENTIONS:  -  Assess patient's ability to carry out ADLs; assess patient's baseline for ADL function and identify physical deficits which impact ability to perform ADLs (bathing, care of mouth/teeth, toileting, grooming, dressing, etc )  - Assess/evaluate cause of self-care deficits   - Assess range of motion  - Assess patient's mobility; develop plan if impaired  - Assess patient's need for assistive devices and provide as appropriate  - Encourage maximum independence but intervene and supervise when necessary  - Involve family in performance of ADLs  - Assess for home care needs following discharge   - Consider OT consult to assist with ADL evaluation and planning for discharge  - Provide patient education as appropriate  Outcome: Progressing  Goal: Maintains/Returns to pre admission functional level  Description: INTERVENTIONS:  - Perform BMAT or MOVE assessment daily    - Set and communicate daily mobility goal to care team and patient/family/caregiver  - Collaborate with rehabilitation services on mobility goals if consulted  - Perform Range of Motion 3 times a day  - Reposition patient every 2 hours  - Dangle patient 3 times a day  - Stand patient 3 times a day  - Ambulate patient 3 times a day  - Out of bed to chair 3 times a day   - Out of bed for meals 3 times a day  - Out of bed for toileting  - Record patient progress and toleration of activity level   Outcome: Progressing     Problem: Nutrition/Hydration-ADULT  Goal: Nutrient/Hydration intake appropriate for improving, restoring or maintaining nutritional needs  Description: Monitor and assess patient's nutrition/hydration status for malnutrition  Collaborate with interdisciplinary team and initiate plan and interventions as ordered  Monitor patient's weight and dietary intake as ordered or per policy  Utilize nutrition screening tool and intervene as necessary  Determine patient's food preferences and provide high-protein, high-caloric foods as appropriate       INTERVENTIONS:  - Monitor oral intake, urinary output, labs, and treatment plans  - Assess nutrition and hydration status and recommend course of action  - Evaluate amount of meals eaten  - Assist patient with eating if necessary   - Allow adequate time for meals  - Recommend/ encourage appropriate diets, oral nutritional supplements, and vitamin/mineral supplements  - Order, calculate, and assess calorie counts as needed  - Recommend, monitor, and adjust tube feedings and TPN/PPN based on assessed needs  - Assess need for intravenous fluids  - Provide specific nutrition/hydration education as appropriate  - Include patient/family/caregiver in decisions related to nutrition  Outcome: Progressing

## 2023-05-07 NOTE — PROGRESS NOTES
[facility] Practice Progress Note - Tan Nicholas 60 y o  male MRN: [id number]    Unit/Bed#: 2 [address] 202 Encounter: [id number]      Assessment/Plan:  * Pneumonia  Assessment & Plan  Acute    Pt found to have infiltrate on CXR  Final read pending on admission  Pt on 3L of O2 at home and on admission with O2 sat >90%  VSS, WBC 16 78, Lactic acid 2 2, procalcitonin  05,  ED Course: Zosyn 4 5 g  CXR: No acute cardiopulmonary disease  Urine strep and legionella neg  WBC slightly increased to 17 57 today  Pt states that he had increased coughing last night  · Continue Ceftriaxone and azithromycin   · Lactic acid normalized  · Robitussin Dm for Cough    · Incentive spirometry  · Titrate O2 as needed, currently on 3L (home O2)    Uncontrolled diabetes mellitus with hyperglycemia (HCC)  Assessment & Plan  Lab Results   Component Value Date    HGBA1C 11 6 (H) 01/19/2023     Pt with hx on uncontrolled DM 2  Last A1c 11 6 in 1/23  Home medications include metformin, liraglutide, lantus 70U BID, Aspart 35U TID   Glucose on admission 241  · F/u Repeat A1c   · Lantus 45BID and titrate up as needed   · Lispro 15U TID with meals, titrate up as needed   · Continue to monitor glucose    · Continue metformin 1000 mg BID    Ambulatory dysfunction  Assessment & Plan  Pt uses scooter to ambulate     · Fall precautions  · PT/OT     Generalized weakness  Assessment & Plan  Pt presents with generalized weakness for 1 week since he has been sick  · Fall precautions   · PT/OT   · Continue to monitor     Chronic obstructive pulmonary disease, unspecified ([address] )  Assessment & Plan  Hx of COPD and follows with Pulmonology outpt  Home O2 of 3L   Pt currently on 3L on admission with O2 sat > 90%  · Continue Breo Ellipta and duonebs q4 scheduled  · Continue Albuterol as needed for SOB   · Titrate O2 as needed     Chronic lymphocytic leukemia of B-cell type in remission [facility])  Assessment & Plan  Hx of CLL   Pt unable to relay "history regarding time of diagnosis  · No treatment at the moment     Low back pain, unspecified  Assessment & Plan  Chronic, stable   Pt follows with pain management outpt and received medial branch block in 3/23  · Continue tizanidine as needed for muscle spasms     Paroxysmal atrial fibrillation (HCC)  Assessment & Plan  Pt with hx of atrial fibrillation  Rate on admission 80  · Telemetry   · Continue metoprolol, eliquis and digoxin     Bipolar disorder (HCC)  Assessment & Plan  Stable     · Continue home medications : sertraline, quetiapine, buspar and lamictal     Obstructive sleep apnea  Assessment & Plan  Pt has hx of mild to moderate SHAVONNE with good compliance to his Bipap with settings ( 12/5, 3L)     · Continue bipap     Essential hypertension  Assessment & Plan  BP on admission 159/84  · Continue aldactone, metoprolol and losartan daily  · Monitor VS     Esophageal reflux  Assessment & Plan  Stable     · Continue PPI     Coronary artery disease  Assessment & Plan  Hx of CAD  Unknown history of cardiac stents  Home medications ASA, lipitor and metoprolol       - continue home medications     Subjective:   Pt seen and examined at bedside  Pt states that he had increased coughing last night but no increased need for oxygen  Pt also reports diarrhea for 1 week, which has been improving  Also states that he has back pain  Denies any other acute complaints at this time  Objective:     Vitals: Blood pressure 150/88, pulse 82, temperature (!) 97 3 °F (36 3 °C), resp  rate 18, height 5' 11\" (1 803 m), weight 116 kg (256 lb 9 6 oz), SpO2 96 %  ,Body mass index is 35 79 kg/m²    Wt Readings from Last 3 Encounters:   05/06/23 116 kg (256 lb 9 6 oz)   04/19/23 113 kg (250 lb)   03/14/23 112 kg (248 lb)       Intake/Output Summary (Last 24 hours) at 5/7/2023 0827  Last data filed at 5/6/2023 2029  Gross per 24 hour   Intake 50 ml   Output 400 ml   Net -350 ml       Physical Exam  Constitutional:       " Appearance: Normal appearance  Comments: Currently on 3L   HENT:      Head: Normocephalic and atraumatic  Mouth/Throat:      Mouth: Mucous membranes are moist    Eyes:      General:         Right eye: No discharge  Left eye: No discharge  Conjunctiva/sclera: Conjunctivae normal    Cardiovascular:      Rate and Rhythm: Normal rate and regular rhythm  Pulses: Normal pulses  Heart sounds: Normal heart sounds  Pulmonary:      Effort: Pulmonary effort is normal  No respiratory distress  Breath sounds: Normal breath sounds  Abdominal:      General: Bowel sounds are normal       Palpations: Abdomen is soft  Tenderness: There is no abdominal tenderness  Musculoskeletal:      Cervical back: Neck supple  Right lower leg: No edema  Left lower leg: No edema  Skin:     General: Skin is warm and dry  Capillary Refill: Capillary refill takes less than 2 seconds  Neurological:      Mental Status: He is alert             Recent Results (from the past 24 hour(s))   Fingerstick Glucose (POCT)    Collection Time: 05/06/23 11:15 AM   Result Value Ref Range    POC Glucose 206 (H) 65 - 140 mg/dl   Fingerstick Glucose (POCT)    Collection Time: 05/06/23  3:55 PM   Result Value Ref Range    POC Glucose 129 65 - 140 mg/dl   Fingerstick Glucose (POCT)    Collection Time: 05/06/23  9:07 PM   Result Value Ref Range    POC Glucose 228 (H) 65 - 140 mg/dl   CBC and differential    Collection Time: 05/07/23  5:14 AM   Result Value Ref Range    WBC 17 57 (H) 4 31 - 10 16 Thousand/uL    RBC 4 31 3 88 - 5 62 Million/uL    Hemoglobin 14 1 12 0 - 17 0 g/dL    Hematocrit 41 0 36 5 - 49 3 %    MCV 95 82 - 98 fL    MCH 32 7 26 8 - 34 3 pg    MCHC 34 4 31 4 - 37 4 g/dL    RDW 12 7 11 6 - 15 1 %    MPV 10 4 8 9 - 12 7 fL    Platelets 818 888 - 471 Thousands/uL   Basic metabolic panel    Collection Time: 05/07/23  5:14 AM   Result Value Ref Range    Sodium 136 135 - 147 mmol/L    Potassium 4 0 3 5 - 5 3 mmol/L    Chloride 99 96 - 108 mmol/L    CO2 26 21 - 32 mmol/L    ANION GAP 11 4 - 13 mmol/L    BUN 8 5 - 25 mg/dL    Creatinine 0 65 0 60 - 1 30 mg/dL    Glucose 138 65 - 140 mg/dL    Calcium 8 9 8 4 - 10 2 mg/dL    eGFR 103 ml/min/1 73sq m   Magnesium    Collection Time: 05/07/23  5:14 AM   Result Value Ref Range    Magnesium 1 9 1 9 - 2 7 mg/dL   Fingerstick Glucose (POCT)    Collection Time: 05/07/23  7:21 AM   Result Value Ref Range    POC Glucose 134 65 - 140 mg/dl       Current Facility-Administered Medications   Medication Dose Route Frequency Provider Last Rate Last Admin   • acetaminophen (TYLENOL) tablet 650 mg  650 mg Oral Q6H PRN Landon Walker MD   650 mg at 05/06/23 2040   • apixaban (ELIQUIS) tablet 5 mg  5 mg Oral BID Landon Walker MD   5 mg at 05/06/23 1808   • ascorbic acid (VITAMIN C) tablet 1,000 mg  1,000 mg Oral Daily Landon Walker MD   1,000 mg at 05/06/23 0914   • aspirin (ECOTRIN LOW STRENGTH) EC tablet 81 mg  81 mg Oral Daily Marcia Oneill MD   81 mg at 05/06/23 0914   • atorvastatin (LIPITOR) tablet 20 mg  20 mg Oral Daily With Dinner Landon Walker MD   20 mg at 05/06/23 1808   • azithromycin (ZITHROMAX) tablet 500 mg  500 mg Oral Q24H Rocky Hyde MD   500 mg at 05/06/23 1920   • benzonatate (TESSALON PERLES) capsule 100 mg  100 mg Oral TID Rocky Hyde MD   100 mg at 05/06/23 2037   • bismuth subsalicylate (PEPTO BISMOL) oral suspension 524 mg  524 mg Oral 4x Daily Landon Walker MD   524 mg at 05/06/23 1808   • busPIRone (BUSPAR) tablet 10 mg  10 mg Oral BID Landon Walker MD   10 mg at 05/06/23 1808   • calcium carbonate (TUMS) chewable tablet 1,000 mg  1,000 mg Oral Daily PRN Landon Walker MD       • cefTRIAXone (ROCEPHIN) IVPB (premix in dextrose) 1,000 mg 50 mL  1,000 mg Intravenous Q24H Landon Walker  mL/hr at 05/06/23 1943 1,000 mg at 05/06/23 1943   • cholecalciferol (VITAMIN D3) tablet 1,000 Units  1,000 Units Oral Daily Valerie Patino MD   1,000 Units at 05/06/23 0914   • digoxin (LANOXIN) tablet 250 mcg  250 mcg Oral Daily Valerie Patino MD   250 mcg at 05/06/23 0914   • fish oil capsule 1,000 mg  1,000 mg Oral Daily Valerie Patino MD   1,000 mg at 05/06/23 0915   • Fluticasone Furoate-Vilanterol 100-25 mcg/actuation 1 puff  1 puff Inhalation Daily Valerie Patino MD   1 puff at 05/06/23 0918   • gabapentin (NEURONTIN) capsule 600 mg  600 mg Oral TID Valerie Patino MD   600 mg at 05/06/23 2037   • guaiFENesin (MUCINEX) 12 hr tablet 600 mg  600 mg Oral Q12H [address] Korin Mahoney MD   600 mg at 05/06/23 2241   • insulin glargine (LANTUS) subcutaneous injection 45 Units 0 45 mL  45 Units Subcutaneous BID Valerie Patino MD   45 Units at 05/06/23 2245   • insulin lispro (HumaLOG) 100 units/mL subcutaneous injection 15 Units  15 Units Subcutaneous TID With Meals Korin Mahoney MD       • ipratropium-albuterol (DUO-NEB) 0 5-2 5 mg/3 mL inhalation solution 3 mL  3 mL Nebulization Q4H Valerie Patino MD   3 mL at 05/07/23 3838   • lamoTRIgine (LaMICtal) tablet 25 mg  25 mg Oral HS Valerie Ptaino MD   25 mg at 05/06/23 2239   • losartan (COZAAR) tablet 50 mg  50 mg Oral Daily Valerie Patino MD   50 mg at 05/06/23 1998   • magnesium sulfate IVPB (premix) SOLN 1 g  1 g Intravenous Once Korin Mahoney MD       • menthol-methyl salicylate (BENGAY) 97-43 % cream   Apply externally 4x Daily PRN Korin Mahoney MD       • metFORMIN (GLUCOPHAGE) tablet 1,000 mg  1,000 mg Oral BID With Meals Korin Mahoney MD   1,000 mg at 05/06/23 1808   • metoprolol succinate (TOPROL-XL) 24 hr tablet 200 mg  200 mg Oral Daily Valerie Patino MD   200 mg at 05/06/23 0917   • multivitamin-minerals (CENTRUM) tablet 1 tablet  1 tablet Oral Daily Valerie Patino MD   1 tablet at 05/06/23 0914   • ondansetron (ZOFRAN) injection 4 mg  4 mg Intravenous Q6H PRN Valerie Patino MD • pantoprazole (PROTONIX) EC tablet 20 mg  20 mg Oral Early Morning Marcia Ibrahim MD   20 mg at 05/07/23 2868   • QUEtiapine (SEROquel XR) 24 hr tablet 100 mg  100 mg Oral QAM Keira Phoenix MD   100 mg at 05/06/23 0810   • QUEtiapine (SEROquel) tablet 300 mg  300 mg Oral HS Keira Phoenix MD   300 mg at 05/06/23 2239   • sertraline (ZOLOFT) tablet 50 mg  50 mg Oral Daily Keira Phoenix MD   50 mg at 05/06/23 0915   • spironolactone (ALDACTONE) tablet 25 mg  25 mg Oral Daily Keira Phoenix MD   25 mg at 05/06/23 8147   • tiZANidine (ZANAFLEX) tablet 4 mg  4 mg Oral Q8H PRN Keira Phoenix MD   4 mg at 05/06/23 1646       Invasive Devices     Peripheral Intravenous Line  Duration           Peripheral IV 05/05/23 Left Arm 1 day                Lab, Imaging and other studies: I have personally reviewed pertinent reports      VTE Pharmacologic Prophylaxis: Eliquis   VTE Mechanical Prophylaxis: sequential compression device    Betsy Bledsoe MD

## 2023-05-07 NOTE — PLAN OF CARE
Problem: RESPIRATORY - ADULT  Goal: Achieves optimal ventilation and oxygenation  Description: INTERVENTIONS:  - Assess for changes in respiratory status  - Assess for changes in mentation and behavior  - Position to facilitate oxygenation and minimize respiratory effort  - Oxygen administered by appropriate delivery if ordered  - Initiate smoking cessation education as indicated  - Encourage broncho-pulmonary hygiene including cough, deep breathe, Incentive Spirometry  - Assess the need for suctioning and aspirate as needed  - Assess and instruct to report SOB or any respiratory difficulty  - Respiratory Therapy support as indicated  Outcome: Progressing     Problem: INFECTION - ADULT  Goal: Absence or prevention of progression during hospitalization  Description: INTERVENTIONS:  - Assess and monitor for signs and symptoms of infection  - Monitor lab/diagnostic results  - Monitor all insertion sites, i e  indwelling lines, tubes, and drains  - Monitor endotracheal if appropriate and nasal secretions for changes in amount and color  - [place] appropriate cooling/warming therapies per order  - Administer medications as ordered  - Instruct and encourage patient and family to use good hand hygiene technique  - Identify and instruct in appropriate isolation precautions for identified infection/condition  Outcome: Progressing  Goal: Absence of fever/infection during neutropenic period  Description: INTERVENTIONS:  - Monitor WBC    Outcome: Progressing     Problem: SAFETY ADULT  Goal: Patient will remain free of falls  Description: INTERVENTIONS:  - Educate patient/family on patient safety including physical limitations  - Instruct patient to call for assistance with activity   - Consult OT/PT to assist with strengthening/mobility   - Keep Call bell within reach  - Keep bed low and locked with side rails adjusted as appropriate  - Keep care items and personal belongings within reach  - Initiate and maintain comfort rounds  - Make Fall Risk Sign visible to staff  - Offer Toileting every 2 Hours, in advance of need  - Initiate/Maintain bed alarm  - Obtain necessary fall risk management equipment:   - Apply yellow socks and bracelet for high fall risk patients  - Consider moving patient to room near nurses station  Outcome: Progressing  Goal: Maintain or return to baseline ADL function  Description: INTERVENTIONS:  -  Assess patient's ability to carry out ADLs; assess patient's baseline for ADL function and identify physical deficits which impact ability to perform ADLs (bathing, care of mouth/teeth, toileting, grooming, dressing, etc )  - Assess/evaluate cause of self-care deficits   - Assess range of motion  - Assess patient's mobility; develop plan if impaired  - Assess patient's need for assistive devices and provide as appropriate  - Encourage maximum independence but intervene and supervise when necessary  - Involve family in performance of ADLs  - Assess for home care needs following discharge   - Consider OT consult to assist with ADL evaluation and planning for discharge  - Provide patient education as appropriate  Outcome: Progressing  Goal: Maintains/Returns to pre admission functional level  Description: INTERVENTIONS:  - Perform BMAT or MOVE assessment daily    - Set and communicate daily mobility goal to care team and patient/family/caregiver  - Collaborate with rehabilitation services on mobility goals if consulted  - Perform Range of Motion 3 times a day  - Reposition patient every 2 hours    - Dangle patient 3 times a day  - Stand patient 3 times a day  - Ambulate patient 3 times a day  - Out of bed to chair 3 times a day   - Out of bed for meals 3 times a day  - Out of bed for toileting  - Record patient progress and toleration of activity level   Outcome: Progressing     Problem: DISCHARGE PLANNING  Goal: Discharge to home or other facility with appropriate resources  Description: INTERVENTIONS:  - Identify barriers to discharge w/patient and caregiver  - Arrange for needed discharge resources and transportation as appropriate  - Identify discharge learning needs (meds, wound care, etc )  - Arrange for interpretive services to assist at discharge as needed  - Refer to Case Management Department for coordinating discharge planning if the patient needs post-hospital services based on physician/advanced practitioner order or complex needs related to functional status, cognitive ability, or social support system  Outcome: Progressing     Problem: Knowledge Deficit  Goal: Patient/family/caregiver demonstrates understanding of disease process, treatment plan, medications, and discharge instructions  Description: Complete learning assessment and assess knowledge base    Interventions:  - Provide teaching at level of understanding  - Provide teaching via preferred learning methods  Outcome: Progressing

## 2023-05-08 ENCOUNTER — APPOINTMENT (INPATIENT)
Dept: RADIOLOGY | Facility: HOSPITAL | Age: 63
End: 2023-05-08

## 2023-05-08 LAB
ALL TARGETS: NOT DETECTED
ANION GAP SERPL CALCULATED.3IONS-SCNC: 10 MMOL/L (ref 4–13)
ANION GAP SERPL CALCULATED.3IONS-SCNC: 12 MMOL/L (ref 4–13)
ANION GAP SERPL CALCULATED.3IONS-SCNC: 8 MMOL/L (ref 4–13)
BACTERIA BLD CULT: ABNORMAL
BASOPHILS # BLD AUTO: 0.05 THOUSANDS/ÂΜL (ref 0–0.1)
BASOPHILS NFR BLD AUTO: 0 % (ref 0–1)
BUN SERPL-MCNC: 10 MG/DL (ref 5–25)
BUN SERPL-MCNC: 9 MG/DL (ref 5–25)
BUN SERPL-MCNC: 9 MG/DL (ref 5–25)
CALCIUM SERPL-MCNC: 8.7 MG/DL (ref 8.4–10.2)
CALCIUM SERPL-MCNC: 8.8 MG/DL (ref 8.4–10.2)
CALCIUM SERPL-MCNC: 9.4 MG/DL (ref 8.4–10.2)
CHLORIDE SERPL-SCNC: 90 MMOL/L (ref 96–108)
CHLORIDE SERPL-SCNC: 92 MMOL/L (ref 96–108)
CHLORIDE SERPL-SCNC: 93 MMOL/L (ref 96–108)
CO2 SERPL-SCNC: 23 MMOL/L (ref 21–32)
CO2 SERPL-SCNC: 25 MMOL/L (ref 21–32)
CO2 SERPL-SCNC: 26 MMOL/L (ref 21–32)
CREAT SERPL-MCNC: 0.64 MG/DL (ref 0.6–1.3)
CREAT SERPL-MCNC: 0.75 MG/DL (ref 0.6–1.3)
CREAT SERPL-MCNC: 0.81 MG/DL (ref 0.6–1.3)
EOSINOPHIL # BLD AUTO: 0.21 THOUSAND/ÂΜL (ref 0–0.61)
EOSINOPHIL NFR BLD AUTO: 1 % (ref 0–6)
ERYTHROCYTE [DISTWIDTH] IN BLOOD BY AUTOMATED COUNT: 12.3 % (ref 11.6–15.1)
GFR SERPL CREATININE-BSD FRML MDRD: 104 ML/MIN/1.73SQ M
GFR SERPL CREATININE-BSD FRML MDRD: 94 ML/MIN/1.73SQ M
GFR SERPL CREATININE-BSD FRML MDRD: 97 ML/MIN/1.73SQ M
GLUCOSE SERPL-MCNC: 103 MG/DL (ref 65–140)
GLUCOSE SERPL-MCNC: 111 MG/DL (ref 65–140)
GLUCOSE SERPL-MCNC: 138 MG/DL (ref 65–140)
GLUCOSE SERPL-MCNC: 185 MG/DL (ref 65–140)
GLUCOSE SERPL-MCNC: 185 MG/DL (ref 65–140)
GLUCOSE SERPL-MCNC: 201 MG/DL (ref 65–140)
GLUCOSE SERPL-MCNC: 239 MG/DL (ref 65–140)
GRAM STN SPEC: ABNORMAL
HCT VFR BLD AUTO: 36.6 % (ref 36.5–49.3)
HGB BLD-MCNC: 12.9 G/DL (ref 12–17)
IMM GRANULOCYTES # BLD AUTO: 0.11 THOUSAND/UL (ref 0–0.2)
IMM GRANULOCYTES NFR BLD AUTO: 1 % (ref 0–2)
LYMPHOCYTES # BLD AUTO: 13.68 THOUSANDS/ÂΜL (ref 0.6–4.47)
LYMPHOCYTES NFR BLD AUTO: 72 % (ref 14–44)
MAGNESIUM SERPL-MCNC: 1.6 MG/DL (ref 1.9–2.7)
MCH RBC QN AUTO: 32 PG (ref 26.8–34.3)
MCHC RBC AUTO-ENTMCNC: 35.2 G/DL (ref 31.4–37.4)
MCV RBC AUTO: 91 FL (ref 82–98)
MONOCYTES # BLD AUTO: 0.55 THOUSAND/ÂΜL (ref 0.17–1.22)
MONOCYTES NFR BLD AUTO: 3 % (ref 4–12)
NEUTROPHILS # BLD AUTO: 4.42 THOUSANDS/ÂΜL (ref 1.85–7.62)
NEUTS SEG NFR BLD AUTO: 23 % (ref 43–75)
NRBC BLD AUTO-RTO: 0 /100 WBCS
PLATELET # BLD AUTO: 155 THOUSANDS/UL (ref 149–390)
PMV BLD AUTO: 9.3 FL (ref 8.9–12.7)
POTASSIUM SERPL-SCNC: 3.6 MMOL/L (ref 3.5–5.3)
POTASSIUM SERPL-SCNC: 4.2 MMOL/L (ref 3.5–5.3)
POTASSIUM SERPL-SCNC: 5.3 MMOL/L (ref 3.5–5.3)
RBC # BLD AUTO: 4.03 MILLION/UL (ref 3.88–5.62)
SODIUM SERPL-SCNC: 123 MMOL/L (ref 135–147)
SODIUM SERPL-SCNC: 128 MMOL/L (ref 135–147)
SODIUM SERPL-SCNC: 128 MMOL/L (ref 135–147)
WBC # BLD AUTO: 19.02 THOUSAND/UL (ref 4.31–10.16)

## 2023-05-08 RX ORDER — INSULIN LISPRO 100 [IU]/ML
13 INJECTION, SOLUTION INTRAVENOUS; SUBCUTANEOUS
Status: DISCONTINUED | OUTPATIENT
Start: 2023-05-08 | End: 2023-05-09

## 2023-05-08 RX ORDER — KETOROLAC TROMETHAMINE 30 MG/ML
15 INJECTION, SOLUTION INTRAMUSCULAR; INTRAVENOUS ONCE AS NEEDED
Status: COMPLETED | OUTPATIENT
Start: 2023-05-08 | End: 2023-05-08

## 2023-05-08 RX ORDER — FUROSEMIDE 10 MG/ML
40 INJECTION INTRAMUSCULAR; INTRAVENOUS ONCE
Status: COMPLETED | OUTPATIENT
Start: 2023-05-08 | End: 2023-05-08

## 2023-05-08 RX ORDER — ACETYLCYSTEINE 200 MG/ML
3 SOLUTION ORAL; RESPIRATORY (INHALATION) 2 TIMES DAILY
Status: DISPENSED | OUTPATIENT
Start: 2023-05-08 | End: 2023-05-09

## 2023-05-08 RX ORDER — MAGNESIUM SULFATE HEPTAHYDRATE 40 MG/ML
2 INJECTION, SOLUTION INTRAVENOUS ONCE
Status: COMPLETED | OUTPATIENT
Start: 2023-05-08 | End: 2023-05-08

## 2023-05-08 RX ORDER — FUROSEMIDE 10 MG/ML
20 INJECTION INTRAMUSCULAR; INTRAVENOUS ONCE
Status: COMPLETED | OUTPATIENT
Start: 2023-05-08 | End: 2023-05-08

## 2023-05-08 RX ORDER — POTASSIUM CHLORIDE 20 MEQ/1
40 TABLET, EXTENDED RELEASE ORAL ONCE
Status: COMPLETED | OUTPATIENT
Start: 2023-05-08 | End: 2023-05-08

## 2023-05-08 RX ORDER — BUDESONIDE 0.5 MG/2ML
0.5 INHALANT ORAL
Status: DISCONTINUED | OUTPATIENT
Start: 2023-05-08 | End: 2023-05-11 | Stop reason: HOSPADM

## 2023-05-08 RX ORDER — IPRATROPIUM BROMIDE AND ALBUTEROL SULFATE 2.5; .5 MG/3ML; MG/3ML
3 SOLUTION RESPIRATORY (INHALATION)
Status: DISCONTINUED | OUTPATIENT
Start: 2023-05-08 | End: 2023-05-11 | Stop reason: HOSPADM

## 2023-05-08 RX ORDER — FUROSEMIDE 10 MG/ML
20 SYRINGE (ML) INJECTION CONTINUOUS
Status: DISCONTINUED | OUTPATIENT
Start: 2023-05-08 | End: 2023-05-08

## 2023-05-08 RX ORDER — SODIUM CHLORIDE 9 MG/ML
75 INJECTION, SOLUTION INTRAVENOUS CONTINUOUS
Status: DISCONTINUED | OUTPATIENT
Start: 2023-05-08 | End: 2023-05-08

## 2023-05-08 RX ORDER — INSULIN GLARGINE 100 [IU]/ML
40 INJECTION, SOLUTION SUBCUTANEOUS 2 TIMES DAILY
Status: DISCONTINUED | OUTPATIENT
Start: 2023-05-08 | End: 2023-05-11 | Stop reason: HOSPADM

## 2023-05-08 RX ORDER — FLUTICASONE PROPIONATE 50 MCG
1 SPRAY, SUSPENSION (ML) NASAL DAILY
Status: DISCONTINUED | OUTPATIENT
Start: 2023-05-09 | End: 2023-05-11 | Stop reason: HOSPADM

## 2023-05-08 RX ADMIN — GUAIFENESIN 600 MG: 600 TABLET, EXTENDED RELEASE ORAL at 08:49

## 2023-05-08 RX ADMIN — PANTOPRAZOLE SODIUM 20 MG: 20 TABLET, DELAYED RELEASE ORAL at 06:03

## 2023-05-08 RX ADMIN — GABAPENTIN 600 MG: 300 CAPSULE ORAL at 20:05

## 2023-05-08 RX ADMIN — QUETIAPINE FUMARATE 300 MG: 100 TABLET ORAL at 21:04

## 2023-05-08 RX ADMIN — IOHEXOL 100 ML: 350 INJECTION, SOLUTION INTRAVENOUS at 12:25

## 2023-05-08 RX ADMIN — BUSPIRONE HYDROCHLORIDE 10 MG: 10 TABLET ORAL at 08:49

## 2023-05-08 RX ADMIN — DIGOXIN 250 MCG: 0.12 TABLET ORAL at 08:49

## 2023-05-08 RX ADMIN — FUROSEMIDE 40 MG: 10 INJECTION, SOLUTION INTRAMUSCULAR; INTRAVENOUS at 16:55

## 2023-05-08 RX ADMIN — QUETIAPINE FUMARATE 100 MG: 50 TABLET, EXTENDED RELEASE ORAL at 08:50

## 2023-05-08 RX ADMIN — INSULIN LISPRO 13 UNITS: 100 INJECTION, SOLUTION INTRAVENOUS; SUBCUTANEOUS at 17:02

## 2023-05-08 RX ADMIN — BENZONATATE 100 MG: 100 CAPSULE ORAL at 16:55

## 2023-05-08 RX ADMIN — BENZONATATE 100 MG: 100 CAPSULE ORAL at 20:05

## 2023-05-08 RX ADMIN — BISMUTH SUBSALICYLATE 524 MG: 525 LIQUID ORAL at 11:42

## 2023-05-08 RX ADMIN — METFORMIN HYDROCHLORIDE 1000 MG: 500 TABLET ORAL at 16:55

## 2023-05-08 RX ADMIN — BISMUTH SUBSALICYLATE 524 MG: 525 LIQUID ORAL at 08:49

## 2023-05-08 RX ADMIN — MAGNESIUM SULFATE HEPTAHYDRATE 2 G: 40 INJECTION, SOLUTION INTRAVENOUS at 11:47

## 2023-05-08 RX ADMIN — GUAIFENESIN 600 MG: 600 TABLET, EXTENDED RELEASE ORAL at 20:05

## 2023-05-08 RX ADMIN — ASPIRIN 81 MG: 81 TABLET, COATED ORAL at 08:49

## 2023-05-08 RX ADMIN — BUDESONIDE 0.5 MG: 0.5 INHALANT ORAL at 20:05

## 2023-05-08 RX ADMIN — MAGNESIUM SULFATE HEPTAHYDRATE 2 G: 40 INJECTION, SOLUTION INTRAVENOUS at 08:48

## 2023-05-08 RX ADMIN — ATORVASTATIN CALCIUM 40 MG: 40 TABLET, FILM COATED ORAL at 16:55

## 2023-05-08 RX ADMIN — AZITHROMYCIN MONOHYDRATE 500 MG: 250 TABLET ORAL at 16:59

## 2023-05-08 RX ADMIN — CHOLECALCIFEROL TAB 25 MCG (1000 UNIT) 1000 UNITS: 25 TAB at 08:50

## 2023-05-08 RX ADMIN — ACETYLCYSTEINE 600 MG: 200 SOLUTION ORAL; RESPIRATORY (INHALATION) at 20:05

## 2023-05-08 RX ADMIN — FUROSEMIDE 20 MG: 10 INJECTION, SOLUTION INTRAMUSCULAR; INTRAVENOUS at 21:20

## 2023-05-08 RX ADMIN — SPIRONOLACTONE 25 MG: 25 TABLET ORAL at 08:50

## 2023-05-08 RX ADMIN — LAMOTRIGINE 25 MG: 25 TABLET ORAL at 21:04

## 2023-05-08 RX ADMIN — IPRATROPIUM BROMIDE AND ALBUTEROL SULFATE 3 ML: 2.5; .5 SOLUTION RESPIRATORY (INHALATION) at 20:05

## 2023-05-08 RX ADMIN — GABAPENTIN 600 MG: 300 CAPSULE ORAL at 08:49

## 2023-05-08 RX ADMIN — BUSPIRONE HYDROCHLORIDE 10 MG: 10 TABLET ORAL at 16:55

## 2023-05-08 RX ADMIN — GABAPENTIN 600 MG: 300 CAPSULE ORAL at 16:55

## 2023-05-08 RX ADMIN — IPRATROPIUM BROMIDE AND ALBUTEROL SULFATE 3 ML: .5; 3 SOLUTION RESPIRATORY (INHALATION) at 07:06

## 2023-05-08 RX ADMIN — OMEGA-3 FATTY ACIDS CAP 1000 MG 1000 MG: 1000 CAP at 08:50

## 2023-05-08 RX ADMIN — BISMUTH SUBSALICYLATE 524 MG: 525 LIQUID ORAL at 16:56

## 2023-05-08 RX ADMIN — IPRATROPIUM BROMIDE AND ALBUTEROL SULFATE 3 ML: .5; 3 SOLUTION RESPIRATORY (INHALATION) at 02:44

## 2023-05-08 RX ADMIN — POTASSIUM CHLORIDE 40 MEQ: 1500 TABLET, EXTENDED RELEASE ORAL at 08:48

## 2023-05-08 RX ADMIN — TIZANIDINE 4 MG: 2 TABLET ORAL at 21:14

## 2023-05-08 RX ADMIN — SERTRALINE HYDROCHLORIDE 50 MG: 50 TABLET ORAL at 08:49

## 2023-05-08 RX ADMIN — IPRATROPIUM BROMIDE AND ALBUTEROL SULFATE 3 ML: .5; 3 SOLUTION RESPIRATORY (INHALATION) at 13:22

## 2023-05-08 RX ADMIN — METOPROLOL SUCCINATE 200 MG: 100 TABLET, EXTENDED RELEASE ORAL at 08:49

## 2023-05-08 RX ADMIN — LOSARTAN POTASSIUM 50 MG: 50 TABLET, FILM COATED ORAL at 08:49

## 2023-05-08 RX ADMIN — CEFTRIAXONE 1000 MG: 1 INJECTION, SOLUTION INTRAVENOUS at 19:59

## 2023-05-08 RX ADMIN — INSULIN LISPRO 13 UNITS: 100 INJECTION, SOLUTION INTRAVENOUS; SUBCUTANEOUS at 11:48

## 2023-05-08 RX ADMIN — TIZANIDINE 4 MG: 2 TABLET ORAL at 11:58

## 2023-05-08 RX ADMIN — APIXABAN 5 MG: 5 TABLET, FILM COATED ORAL at 16:55

## 2023-05-08 RX ADMIN — INSULIN GLARGINE 40 UNITS: 100 INJECTION, SOLUTION SUBCUTANEOUS at 21:04

## 2023-05-08 RX ADMIN — APIXABAN 5 MG: 5 TABLET, FILM COATED ORAL at 08:48

## 2023-05-08 RX ADMIN — METFORMIN HYDROCHLORIDE 1000 MG: 500 TABLET ORAL at 08:48

## 2023-05-08 RX ADMIN — OXYCODONE HYDROCHLORIDE AND ACETAMINOPHEN 1000 MG: 500 TABLET ORAL at 08:50

## 2023-05-08 RX ADMIN — BENZONATATE 100 MG: 100 CAPSULE ORAL at 08:49

## 2023-05-08 RX ADMIN — Medication 1 TABLET: at 08:49

## 2023-05-08 RX ADMIN — KETOROLAC TROMETHAMINE 15 MG: 30 INJECTION, SOLUTION INTRAMUSCULAR at 08:49

## 2023-05-08 RX ADMIN — SODIUM CHLORIDE 75 ML/HR: 0.9 INJECTION, SOLUTION INTRAVENOUS at 11:42

## 2023-05-08 NOTE — PLAN OF CARE
Problem: RESPIRATORY - ADULT  Goal: Achieves optimal ventilation and oxygenation  Description: INTERVENTIONS:  - Assess for changes in respiratory status  - Assess for changes in mentation and behavior  - Position to facilitate oxygenation and minimize respiratory effort  - Oxygen administered by appropriate delivery if ordered  - Initiate smoking cessation education as indicated  - Encourage broncho-pulmonary hygiene including cough, deep breathe, Incentive Spirometry  - Assess the need for suctioning and aspirate as needed  - Assess and instruct to report SOB or any respiratory difficulty  - Respiratory Therapy support as indicated  Outcome: Progressing     Problem: INFECTION - ADULT  Goal: Absence or prevention of progression during hospitalization  Description: INTERVENTIONS:  - Assess and monitor for signs and symptoms of infection  - Monitor lab/diagnostic results  - Monitor all insertion sites, i e  indwelling lines, tubes, and drains  - Monitor endotracheal if appropriate and nasal secretions for changes in amount and color  - [place] appropriate cooling/warming therapies per order  - Administer medications as ordered  - Instruct and encourage patient and family to use good hand hygiene technique  - Identify and instruct in appropriate isolation precautions for identified infection/condition  Outcome: Progressing  Goal: Absence of fever/infection during neutropenic period  Description: INTERVENTIONS:  - Monitor WBC    Outcome: Progressing     Problem: SAFETY ADULT  Goal: Patient will remain free of falls  Description: INTERVENTIONS:  - Educate patient/family on patient safety including physical limitations  - Instruct patient to call for assistance with activity   - Consult OT/PT to assist with strengthening/mobility   - Keep Call bell within reach  - Keep bed low and locked with side rails adjusted as appropriate  - Keep care items and personal belongings within reach  - Initiate and maintain comfort rounds  - Make Fall Risk Sign visible to staff  - Offer Toileting every 2 Hours, in advance of need  - Initiate/Maintain bed alarm  - Obtain necessary fall risk management equipment:   - Apply yellow socks and bracelet for high fall risk patients  - Consider moving patient to room near nurses station  Outcome: Progressing  Goal: Maintain or return to baseline ADL function  Description: INTERVENTIONS:  -  Assess patient's ability to carry out ADLs; assess patient's baseline for ADL function and identify physical deficits which impact ability to perform ADLs (bathing, care of mouth/teeth, toileting, grooming, dressing, etc )  - Assess/evaluate cause of self-care deficits   - Assess range of motion  - Assess patient's mobility; develop plan if impaired  - Assess patient's need for assistive devices and provide as appropriate  - Encourage maximum independence but intervene and supervise when necessary  - Involve family in performance of ADLs  - Assess for home care needs following discharge   - Consider OT consult to assist with ADL evaluation and planning for discharge  - Provide patient education as appropriate  Outcome: Progressing  Goal: Maintains/Returns to pre admission functional level  Description: INTERVENTIONS:  - Perform BMAT or MOVE assessment daily    - Set and communicate daily mobility goal to care team and patient/family/caregiver  - Collaborate with rehabilitation services on mobility goals if consulted  - Perform Range of Motion 3 times a day  - Reposition patient every 2 hours    - Dangle patient 3 times a day  - Stand patient 3 times a day  - Ambulate patient 3 times a day  - Out of bed to chair 3 times a day   - Out of bed for meals 3 times a day  - Out of bed for toileting  - Record patient progress and toleration of activity level   Outcome: Progressing     Problem: DISCHARGE PLANNING  Goal: Discharge to home or other facility with appropriate resources  Description: INTERVENTIONS:  - Identify barriers to discharge w/patient and caregiver  - Arrange for needed discharge resources and transportation as appropriate  - Identify discharge learning needs (meds, wound care, etc )  - Arrange for interpretive services to assist at discharge as needed  - Refer to Case Management Department for coordinating discharge planning if the patient needs post-hospital services based on physician/advanced practitioner order or complex needs related to functional status, cognitive ability, or social support system  Outcome: Progressing     Problem: Knowledge Deficit  Goal: Patient/family/caregiver demonstrates understanding of disease process, treatment plan, medications, and discharge instructions  Description: Complete learning assessment and assess knowledge base    Interventions:  - Provide teaching at level of understanding  - Provide teaching via preferred learning methods  Outcome: Progressing     Problem: MOBILITY - ADULT  Goal: Maintain or return to baseline ADL function  Description: INTERVENTIONS:  -  Assess patient's ability to carry out ADLs; assess patient's baseline for ADL function and identify physical deficits which impact ability to perform ADLs (bathing, care of mouth/teeth, toileting, grooming, dressing, etc )  - Assess/evaluate cause of self-care deficits   - Assess range of motion  - Assess patient's mobility; develop plan if impaired  - Assess patient's need for assistive devices and provide as appropriate  - Encourage maximum independence but intervene and supervise when necessary  - Involve family in performance of ADLs  - Assess for home care needs following discharge   - Consider OT consult to assist with ADL evaluation and planning for discharge  - Provide patient education as appropriate  Outcome: Progressing  Goal: Maintains/Returns to pre admission functional level  Description: INTERVENTIONS:  - Perform BMAT or MOVE assessment daily    - Set and communicate daily mobility goal to care team and patient/family/caregiver  - Collaborate with rehabilitation services on mobility goals if consulted  - Perform Range of Motion 3 times a day  - Reposition patient every 2 hours  - Dangle patient 3 times a day  - Stand patient 3 times a day  - Ambulate patient 3 times a day  - Out of bed to chair 3 times a day   - Out of bed for meals 3 times a day  - Out of bed for toileting  - Record patient progress and toleration of activity level   Outcome: Progressing     Problem: Nutrition/Hydration-ADULT  Goal: Nutrient/Hydration intake appropriate for improving, restoring or maintaining nutritional needs  Description: Monitor and assess patient's nutrition/hydration status for malnutrition  Collaborate with interdisciplinary team and initiate plan and interventions as ordered  Monitor patient's weight and dietary intake as ordered or per policy  Utilize nutrition screening tool and intervene as necessary  Determine patient's food preferences and provide high-protein, high-caloric foods as appropriate       INTERVENTIONS:  - Monitor oral intake, urinary output, labs, and treatment plans  - Assess nutrition and hydration status and recommend course of action  - Evaluate amount of meals eaten  - Assist patient with eating if necessary   - Allow adequate time for meals  - Recommend/ encourage appropriate diets, oral nutritional supplements, and vitamin/mineral supplements  - Order, calculate, and assess calorie counts as needed  - Recommend, monitor, and adjust tube feedings and TPN/PPN based on assessed needs  - Assess need for intravenous fluids  - Provide specific nutrition/hydration education as appropriate  - Include patient/family/caregiver in decisions related to nutrition  Outcome: Progressing

## 2023-05-08 NOTE — ASSESSMENT & PLAN NOTE
Chronic    In the setting of COPD, as evidenced by baseline dependence on O2 at 3L  Follows with pulmonology as outpt, chronically requires O2 @ 3L baseline       · Continue supplemental O2 with titration to maintain sat >90%  · Continue to monitor oxygenation status

## 2023-05-08 NOTE — ASSESSMENT & PLAN NOTE
Acute     Potassium decreased from normal range to 128 yesterday and continued to decrease to 123 after initiating fluids  Fluids were halted and pt given 40 mg lasix  This morning, Na is 128  Will give 40 mg lasix and continue monitoring strict I's and O's      · Lasix 40 mg once this morning   · Fluid restriction at 1800 mL  · Trend Na

## 2023-05-08 NOTE — PROGRESS NOTES
[facility] Practice Progress Note - Tan Nicholas 60 y o  male MRN: [id number]    Unit/Bed#: 2 South 202 Encounter: [id number]      Assessment/Plan:  * Pneumonia  Assessment & Plan  Acute    Pt found to have infiltrate on CXR  Pt on 3L of O2 at home and on admission with O2 sat >90%  VSS, WBC 16 78, Lactic acid 2 2, procalcitonin  05,  ED Course: Zosyn 4 5 g  CXR: No acute cardiopulmonary disease  Urine strep and legionella neg  Lactic acid normalized    WBC continues to increase to 19 02 today  Pt states that he feels well and has no complaints  · Ct chest/abdomen/pelvis with contrast pending  · Trend WBC  · Continue Ceftriaxone and azithromycin   · Robitussin Dm for Cough    · Incentive spirometry  · Titrate O2 as needed, currently on 3L (home O2)    Hyponatremia  Assessment & Plan  Acute     Decreased from 136 yesterday to 128 today  Likely secondary to diarrhea  · Started 75 ml/hour NS   · Repeat BMP 3PM   · Trend Na    Uncontrolled diabetes mellitus with hyperglycemia (HCC)  Assessment & Plan  Lab Results   Component Value Date    HGBA1C 11 6 (H) 01/19/2023     Pt with hx on uncontrolled DM 2  Last A1c 11 6 in 1/23  Home medications include metformin, liraglutide, lantus 70U BID, Aspart 35U TID   Glucose on admission 241  · F/u Repeat A1c   · Lantus 45BID and titrate up as needed   · Lispro 15U TID with meals, titrate up as needed   · Continue to monitor glucose    · Continue metformin 1000 mg BID    Ambulatory dysfunction  Assessment & Plan  Pt uses scooter to ambulate     · Fall precautions  · PT/OT     Generalized weakness  Assessment & Plan  Pt presents with generalized weakness for 1 week since he has been sick  · Fall precautions   · PT/OT   · Continue to monitor     Chronic obstructive pulmonary disease, unspecified ([facility] [address] )  Assessment & Plan  Hx of COPD and follows with Pulmonology outpt  Home O2 of 3L   Pt currently on 3L on admission with O2 sat > 90%       · Continue Breo Ellipta "and duonebs q4 scheduled  · Continue Albuterol as needed for SOB   · Titrate O2 as needed     Chronic lymphocytic leukemia of B-cell type in remission [facility])  Assessment & Plan  Hx of CLL  Pt unable to relay history regarding time of diagnosis  · No treatment at the moment     Low back pain, unspecified  Assessment & Plan  Chronic, stable   Pt follows with pain management outpt and received medial branch block in 3/23  · Continue tizanidine as needed for muscle spasms     Paroxysmal atrial fibrillation (HCC)  Assessment & Plan  Pt with hx of atrial fibrillation  Rate on admission 80  · Telemetry   · Continue metoprolol, eliquis and digoxin     Bipolar disorder (HCC)  Assessment & Plan  Stable     · Continue home medications : sertraline, quetiapine, buspar and lamictal     Hyperlipidemia  Assessment & Plan  Increased Lipitor from 20 mg to 40 mg  Continue 40 mg Lipitor daily  Obstructive sleep apnea  Assessment & Plan  Pt has hx of mild to moderate SHAVONNE with good compliance to his Bipap with settings ( 12/5, 3L)     · Continue bipap     Essential hypertension  Assessment & Plan  BP on admission 159/84  · Continue aldactone, metoprolol and losartan daily  · Monitor VS     Esophageal reflux  Assessment & Plan  Stable     · Continue PPI     Coronary artery disease  Assessment & Plan  Hx of CAD  Unknown history of cardiac stents  Home medications ASA, lipitor and metoprolol       - continue home medications         Subjective:   Pt seen and examined at bedside  Patient states that he feels well with no complaints  Also reports has diarrhea is improving  States that he has some back pain and would like some additional pain medications  Denies any other acute complaints at this time  Objective:     Vitals: Blood pressure 159/79, pulse 74, temperature (!) 97 3 °F (36 3 °C), resp  rate 18, height 5' 11\" (1 803 m), weight 116 kg (256 lb 9 9 oz), SpO2 95 %  ,Body mass index is 35 79 kg/m²    Wt " Readings from Last 3 Encounters:   05/08/23 116 kg (256 lb 9 9 oz)   04/19/23 113 kg (250 lb)   03/14/23 112 kg (248 lb)       Intake/Output Summary (Last 24 hours) at 5/8/2023 1002  Last data filed at 5/7/2023 1923  Gross per 24 hour   Intake 50 ml   Output --   Net 50 ml       Physical Exam  Constitutional:       Appearance: Normal appearance  HENT:      Head: Normocephalic and atraumatic  Mouth/Throat:      Mouth: Mucous membranes are moist    Eyes:      General:         Right eye: No discharge  Left eye: No discharge  Conjunctiva/sclera: Conjunctivae normal    Cardiovascular:      Rate and Rhythm: Normal rate and regular rhythm  Pulses: Normal pulses  Heart sounds: Normal heart sounds  Pulmonary:      Effort: Pulmonary effort is normal  No respiratory distress  Breath sounds: Wheezing and rhonchi present  No rales  Abdominal:      General: Bowel sounds are normal       Palpations: Abdomen is soft  Tenderness: There is no abdominal tenderness  Musculoskeletal:      Cervical back: Neck supple  Right lower leg: No edema  Left lower leg: No edema  Skin:     General: Skin is warm and dry  Capillary Refill: Capillary refill takes less than 2 seconds  Neurological:      Mental Status: He is alert             Recent Results (from the past 24 hour(s))   Fingerstick Glucose (POCT)    Collection Time: 05/07/23 11:15 AM   Result Value Ref Range    POC Glucose 208 (H) 65 - 140 mg/dl   Fingerstick Glucose (POCT)    Collection Time: 05/07/23  4:11 PM   Result Value Ref Range    POC Glucose 253 (H) 65 - 140 mg/dl   CBC and differential    Collection Time: 05/08/23  4:36 AM   Result Value Ref Range    WBC 19 02 (H) 4 31 - 10 16 Thousand/uL    RBC 4 03 3 88 - 5 62 Million/uL    Hemoglobin 12 9 12 0 - 17 0 g/dL    Hematocrit 36 6 36 5 - 49 3 %    MCV 91 82 - 98 fL    MCH 32 0 26 8 - 34 3 pg    MCHC 35 2 31 4 - 37 4 g/dL    RDW 12 3 11 6 - 15 1 %    MPV 9 3 8 9 - 12 7 fL    Platelets 395 690 - 225 Thousands/uL    nRBC 0 /100 WBCs    Neutrophils Relative 23 (L) 43 - 75 %    Immat GRANS % 1 0 - 2 %    Lymphocytes Relative 72 (H) 14 - 44 %    Monocytes Relative 3 (L) 4 - 12 %    Eosinophils Relative 1 0 - 6 %    Basophils Relative 0 0 - 1 %    Neutrophils Absolute 4 42 1 85 - 7 62 Thousands/µL    Immature Grans Absolute 0 11 0 00 - 0 20 Thousand/uL    Lymphocytes Absolute 13 68 (H) 0 60 - 4 47 Thousands/µL    Monocytes Absolute 0 55 0 17 - 1 22 Thousand/µL    Eosinophils Absolute 0 21 0 00 - 0 61 Thousand/µL    Basophils Absolute 0 05 0 00 - 0 10 Thousands/µL   Basic metabolic panel    Collection Time: 05/08/23  4:36 AM   Result Value Ref Range    Sodium 128 (L) 135 - 147 mmol/L    Potassium 3 6 3 5 - 5 3 mmol/L    Chloride 93 (L) 96 - 108 mmol/L    CO2 25 21 - 32 mmol/L    ANION GAP 10 4 - 13 mmol/L    BUN 10 5 - 25 mg/dL    Creatinine 0 64 0 60 - 1 30 mg/dL    Glucose 111 65 - 140 mg/dL    Calcium 8 7 8 4 - 10 2 mg/dL    eGFR 104 ml/min/1 73sq m   Magnesium    Collection Time: 05/08/23  4:36 AM   Result Value Ref Range    Magnesium 1 6 (L) 1 9 - 2 7 mg/dL   Fingerstick Glucose (POCT)    Collection Time: 05/08/23  7:05 AM   Result Value Ref Range    POC Glucose 103 65 - 140 mg/dl       Current Facility-Administered Medications   Medication Dose Route Frequency Provider Last Rate Last Admin   • acetaminophen (TYLENOL) tablet 650 mg  650 mg Oral Q6H PRN Marcia Magallanes MD   650 mg at 05/06/23 2040   • apixaban (ELIQUIS) tablet 5 mg  5 mg Oral BID Marcia Magallanes MD   5 mg at 05/08/23 0848   • ascorbic acid (VITAMIN C) tablet 1,000 mg  1,000 mg Oral Daily Landon Walker MD   1,000 mg at 05/08/23 0850   • aspirin (ECOTRIN LOW STRENGTH) EC tablet 81 mg  81 mg Oral Daily Marcia Doshi MD   81 mg at 05/08/23 0849   • atorvastatin (LIPITOR) tablet 40 mg  40 mg Oral Daily With Ana George MD   40 mg at 05/07/23 8637   • azithromycin (ZITHROMAX) tablet 500 mg  500 mg Oral Q24H Nikki lFowers MD   500 mg at 05/07/23 1733   • benzonatate (TESSALON PERLES) capsule 100 mg  100 mg Oral TID Nikki Flowers MD   100 mg at 05/08/23 0849   • bismuth subsalicylate (PEPTO BISMOL) oral suspension 524 mg  524 mg Oral 4x Daily Fabrice Myrick MD   524 mg at 05/08/23 0849   • busPIRone (BUSPAR) tablet 10 mg  10 mg Oral BID Fabrice Myrick MD   10 mg at 05/08/23 0849   • calcium carbonate (TUMS) chewable tablet 1,000 mg  1,000 mg Oral Daily PRN Fabrice Myrick MD       • cefTRIAXone (ROCEPHIN) IVPB (premix in dextrose) 1,000 mg 50 mL  1,000 mg Intravenous Q24H Fabrice Myrick  mL/hr at 05/07/23 1923 1,000 mg at 05/07/23 1923   • cholecalciferol (VITAMIN D3) tablet 1,000 Units  1,000 Units Oral Daily Fabrice Myrick MD   1,000 Units at 05/08/23 0850   • digoxin (LANOXIN) tablet 250 mcg  250 mcg Oral Daily Fabrice Myrick MD   250 mcg at 05/08/23 0849   • fish oil capsule 1,000 mg  1,000 mg Oral Daily Fabrice Myrick MD   1,000 mg at 05/08/23 0850   • Fluticasone Furoate-Vilanterol 100-25 mcg/actuation 1 puff  1 puff Inhalation Daily Fabrice Myrick MD   1 puff at 05/07/23 0829   • gabapentin (NEURONTIN) capsule 600 mg  600 mg Oral TID Fabrice Myrick MD   600 mg at 05/08/23 0849   • guaiFENesin (MUCINEX) 12 hr tablet 600 mg  600 mg Oral Q12H [address] Nikki Flowers MD   600 mg at 05/08/23 0849   • insulin glargine (LANTUS) subcutaneous injection 45 Units 0 45 mL  45 Units Subcutaneous BID Fabrice Myrick MD   45 Units at 05/07/23 2227   • insulin lispro (HumaLOG) 100 units/mL subcutaneous injection 15 Units  15 Units Subcutaneous TID With Meals Nikki Flowers MD   15 Units at 05/07/23 1734   • ipratropium-albuterol (DUO-NEB) 0 5-2 5 mg/3 mL inhalation solution 3 mL  3 mL Nebulization Q6H Scooby Marques MD   3 mL at 05/08/23 0706   • lamoTRIgine (LaMICtal) tablet 25 mg  25 mg Oral HS Fabrice Myrick MD   25 mg at 05/07/23 2130   • losartan (COZAAR) tablet 50 mg  50 mg Oral Daily Marcia Burden MD   50 mg at 05/08/23 0849   • magnesium sulfate 2 g/50 mL IVPB (premix) 2 g  2 g Intravenous Once Jacob Valdivia MD 25 mL/hr at 05/08/23 0848 2 g at 05/08/23 0848   • magnesium sulfate 2 g/50 mL IVPB (premix) 2 g  2 g Intravenous Once Jacob Valdivia MD       • menthol-methyl salicylate (BENGAY) 43-39 % cream   Apply externally 4x Daily PRN Jaci Gates MD       • metFORMIN (GLUCOPHAGE) tablet 1,000 mg  1,000 mg Oral BID With Meals Jaci Gates MD   1,000 mg at 05/08/23 0848   • metoprolol succinate (TOPROL-XL) 24 hr tablet 200 mg  200 mg Oral Daily Loly Xiong MD   200 mg at 05/08/23 0849   • multivitamin-minerals (CENTRUM) tablet 1 tablet  1 tablet Oral Daily Loly Xiong MD   1 tablet at 05/08/23 0849   • ondansetron (ZOFRAN) injection 4 mg  4 mg Intravenous Q6H PRN Loly Xiong MD       • pantoprazole (PROTONIX) EC tablet 20 mg  20 mg Oral Early Morning Loly Xiong MD   20 mg at 05/08/23 0603   • QUEtiapine (SEROquel XR) 24 hr tablet 100 mg  100 mg Oral QAM Loly Xiong MD   100 mg at 05/08/23 0850   • QUEtiapine (SEROquel) tablet 300 mg  300 mg Oral HS Loly Xiong MD   300 mg at 05/07/23 2130   • sertraline (ZOLOFT) tablet 50 mg  50 mg Oral Daily Loly Xiong MD   50 mg at 05/08/23 0849   • sodium chloride 0 9 % infusion  75 mL/hr Intravenous Continuous Jacob Valdivia MD       • spironolactone (ALDACTONE) tablet 25 mg  25 mg Oral Daily Loly Xiong MD   25 mg at 05/08/23 0850   • tiZANidine (ZANAFLEX) tablet 4 mg  4 mg Oral Q8H PRN Loly Xiong MD   4 mg at 05/07/23 2133       Invasive Devices     Peripheral Intravenous Line  Duration           Peripheral IV 05/05/23 Left Arm 2 days                Lab, Imaging and other studies: I have personally reviewed pertinent reports      VTE Pharmacologic Prophylaxis: Eliquis   VTE Mechanical Prophylaxis: sequential compression device    Jacob Valdivia MD

## 2023-05-09 PROBLEM — D72.829 LEUKOCYTOSIS: Status: ACTIVE | Noted: 2023-05-09

## 2023-05-09 PROBLEM — R16.2 HEPATOSPLENOMEGALY: Status: ACTIVE | Noted: 2023-05-09

## 2023-05-09 LAB
ANION GAP SERPL CALCULATED.3IONS-SCNC: 11 MMOL/L (ref 4–13)
ANION GAP SERPL CALCULATED.3IONS-SCNC: 11 MMOL/L (ref 4–13)
BASOPHILS # BLD MANUAL: 0 THOUSAND/UL (ref 0–0.1)
BASOPHILS NFR MAR MANUAL: 0 % (ref 0–1)
BUN SERPL-MCNC: 10 MG/DL (ref 5–25)
BUN SERPL-MCNC: 9 MG/DL (ref 5–25)
CALCIUM SERPL-MCNC: 9 MG/DL (ref 8.4–10.2)
CALCIUM SERPL-MCNC: 9.1 MG/DL (ref 8.4–10.2)
CHLORIDE SERPL-SCNC: 91 MMOL/L (ref 96–108)
CHLORIDE SERPL-SCNC: 92 MMOL/L (ref 96–108)
CO2 SERPL-SCNC: 25 MMOL/L (ref 21–32)
CO2 SERPL-SCNC: 25 MMOL/L (ref 21–32)
CREAT SERPL-MCNC: 0.68 MG/DL (ref 0.6–1.3)
CREAT SERPL-MCNC: 0.73 MG/DL (ref 0.6–1.3)
EOSINOPHIL # BLD MANUAL: 0.21 THOUSAND/UL (ref 0–0.4)
EOSINOPHIL NFR BLD MANUAL: 1 % (ref 0–6)
ERYTHROCYTE [DISTWIDTH] IN BLOOD BY AUTOMATED COUNT: 12.5 % (ref 11.6–15.1)
GFR SERPL CREATININE-BSD FRML MDRD: 101 ML/MIN/1.73SQ M
GFR SERPL CREATININE-BSD FRML MDRD: 98 ML/MIN/1.73SQ M
GLUCOSE SERPL-MCNC: 130 MG/DL (ref 65–140)
GLUCOSE SERPL-MCNC: 132 MG/DL (ref 65–140)
GLUCOSE SERPL-MCNC: 132 MG/DL (ref 65–140)
GLUCOSE SERPL-MCNC: 164 MG/DL (ref 65–140)
GLUCOSE SERPL-MCNC: 191 MG/DL (ref 65–140)
GLUCOSE SERPL-MCNC: 227 MG/DL (ref 65–140)
GLUCOSE SERPL-MCNC: 231 MG/DL (ref 65–140)
HCT VFR BLD AUTO: 39.2 % (ref 36.5–49.3)
HGB BLD-MCNC: 13.9 G/DL (ref 12–17)
LYMPHOCYTES # BLD AUTO: 14.08 THOUSAND/UL (ref 0.6–4.47)
LYMPHOCYTES # BLD AUTO: 68 % (ref 14–44)
MAGNESIUM SERPL-MCNC: 1.8 MG/DL (ref 1.9–2.7)
MCH RBC QN AUTO: 32.4 PG (ref 26.8–34.3)
MCHC RBC AUTO-ENTMCNC: 35.5 G/DL (ref 31.4–37.4)
MCV RBC AUTO: 91 FL (ref 82–98)
MONOCYTES # BLD AUTO: 0.41 THOUSAND/UL (ref 0–1.22)
MONOCYTES NFR BLD: 2 % (ref 4–12)
MYELOCYTES NFR BLD MANUAL: 1 % (ref 0–1)
NEUTROPHILS # BLD MANUAL: 5.8 THOUSAND/UL (ref 1.85–7.62)
NEUTS SEG NFR BLD AUTO: 28 % (ref 43–75)
PLATELET # BLD AUTO: 156 THOUSANDS/UL (ref 149–390)
PLATELET BLD QL SMEAR: ABNORMAL
PMV BLD AUTO: 9.3 FL (ref 8.9–12.7)
POTASSIUM SERPL-SCNC: 3.9 MMOL/L (ref 3.5–5.3)
POTASSIUM SERPL-SCNC: 4.4 MMOL/L (ref 3.5–5.3)
RBC # BLD AUTO: 4.29 MILLION/UL (ref 3.88–5.62)
RBC MORPH BLD: NORMAL
SMUDGE CELLS BLD QL SMEAR: PRESENT
SODIUM SERPL-SCNC: 127 MMOL/L (ref 135–147)
SODIUM SERPL-SCNC: 128 MMOL/L (ref 135–147)
WBC # BLD AUTO: 20.71 THOUSAND/UL (ref 4.31–10.16)

## 2023-05-09 RX ORDER — INSULIN LISPRO 100 [IU]/ML
15 INJECTION, SOLUTION INTRAVENOUS; SUBCUTANEOUS
Status: DISCONTINUED | OUTPATIENT
Start: 2023-05-10 | End: 2023-05-11 | Stop reason: HOSPADM

## 2023-05-09 RX ORDER — MAGNESIUM SULFATE HEPTAHYDRATE 40 MG/ML
2 INJECTION, SOLUTION INTRAVENOUS ONCE
Status: COMPLETED | OUTPATIENT
Start: 2023-05-09 | End: 2023-05-09

## 2023-05-09 RX ORDER — ACETYLCYSTEINE 200 MG/ML
3 SOLUTION ORAL; RESPIRATORY (INHALATION)
Status: DISCONTINUED | OUTPATIENT
Start: 2023-05-09 | End: 2023-05-11 | Stop reason: HOSPADM

## 2023-05-09 RX ORDER — FUROSEMIDE 10 MG/ML
40 INJECTION INTRAMUSCULAR; INTRAVENOUS ONCE
Status: DISCONTINUED | OUTPATIENT
Start: 2023-05-09 | End: 2023-05-09

## 2023-05-09 RX ORDER — KETOROLAC TROMETHAMINE 30 MG/ML
15 INJECTION, SOLUTION INTRAMUSCULAR; INTRAVENOUS ONCE AS NEEDED
Status: COMPLETED | OUTPATIENT
Start: 2023-05-09 | End: 2023-05-09

## 2023-05-09 RX ORDER — FUROSEMIDE 10 MG/ML
40 INJECTION INTRAMUSCULAR; INTRAVENOUS ONCE
Status: COMPLETED | OUTPATIENT
Start: 2023-05-09 | End: 2023-05-09

## 2023-05-09 RX ADMIN — CEFTRIAXONE 1000 MG: 1 INJECTION, SOLUTION INTRAVENOUS at 20:47

## 2023-05-09 RX ADMIN — ASPIRIN 81 MG: 81 TABLET, COATED ORAL at 08:28

## 2023-05-09 RX ADMIN — CHOLECALCIFEROL TAB 25 MCG (1000 UNIT) 1000 UNITS: 25 TAB at 08:33

## 2023-05-09 RX ADMIN — IPRATROPIUM BROMIDE AND ALBUTEROL SULFATE 3 ML: 2.5; .5 SOLUTION RESPIRATORY (INHALATION) at 20:39

## 2023-05-09 RX ADMIN — IPRATROPIUM BROMIDE AND ALBUTEROL SULFATE 3 ML: 2.5; .5 SOLUTION RESPIRATORY (INHALATION) at 11:16

## 2023-05-09 RX ADMIN — FLUTICASONE PROPIONATE 1 SPRAY: 50 SPRAY, METERED NASAL at 08:31

## 2023-05-09 RX ADMIN — GUAIFENESIN 600 MG: 600 TABLET, EXTENDED RELEASE ORAL at 20:47

## 2023-05-09 RX ADMIN — INSULIN GLARGINE 40 UNITS: 100 INJECTION, SOLUTION SUBCUTANEOUS at 21:03

## 2023-05-09 RX ADMIN — SERTRALINE HYDROCHLORIDE 50 MG: 50 TABLET ORAL at 08:29

## 2023-05-09 RX ADMIN — METOPROLOL SUCCINATE 200 MG: 100 TABLET, EXTENDED RELEASE ORAL at 08:29

## 2023-05-09 RX ADMIN — MAGNESIUM SULFATE HEPTAHYDRATE 2 G: 40 INJECTION, SOLUTION INTRAVENOUS at 10:05

## 2023-05-09 RX ADMIN — BUSPIRONE HYDROCHLORIDE 10 MG: 10 TABLET ORAL at 08:29

## 2023-05-09 RX ADMIN — OMEGA-3 FATTY ACIDS CAP 1000 MG 1000 MG: 1000 CAP at 08:29

## 2023-05-09 RX ADMIN — QUETIAPINE FUMARATE 100 MG: 50 TABLET, EXTENDED RELEASE ORAL at 08:32

## 2023-05-09 RX ADMIN — LOSARTAN POTASSIUM 50 MG: 50 TABLET, FILM COATED ORAL at 08:29

## 2023-05-09 RX ADMIN — ACETYLCYSTEINE 600 MG: 200 SOLUTION ORAL; RESPIRATORY (INHALATION) at 20:39

## 2023-05-09 RX ADMIN — QUETIAPINE FUMARATE 300 MG: 100 TABLET ORAL at 21:03

## 2023-05-09 RX ADMIN — APIXABAN 5 MG: 5 TABLET, FILM COATED ORAL at 08:29

## 2023-05-09 RX ADMIN — APIXABAN 5 MG: 5 TABLET, FILM COATED ORAL at 18:02

## 2023-05-09 RX ADMIN — IPRATROPIUM BROMIDE AND ALBUTEROL SULFATE 3 ML: 2.5; .5 SOLUTION RESPIRATORY (INHALATION) at 07:40

## 2023-05-09 RX ADMIN — DIGOXIN 250 MCG: 0.12 TABLET ORAL at 08:29

## 2023-05-09 RX ADMIN — TIZANIDINE 4 MG: 2 TABLET ORAL at 08:28

## 2023-05-09 RX ADMIN — KETOROLAC TROMETHAMINE 15 MG: 30 INJECTION, SOLUTION INTRAMUSCULAR at 10:06

## 2023-05-09 RX ADMIN — BENZONATATE 100 MG: 100 CAPSULE ORAL at 20:47

## 2023-05-09 RX ADMIN — INSULIN GLARGINE 40 UNITS: 100 INJECTION, SOLUTION SUBCUTANEOUS at 08:29

## 2023-05-09 RX ADMIN — METFORMIN HYDROCHLORIDE 1000 MG: 500 TABLET ORAL at 08:29

## 2023-05-09 RX ADMIN — BUDESONIDE 0.5 MG: 0.5 INHALANT ORAL at 20:39

## 2023-05-09 RX ADMIN — BISMUTH SUBSALICYLATE 524 MG: 525 LIQUID ORAL at 18:01

## 2023-05-09 RX ADMIN — ACETYLCYSTEINE 600 MG: 200 SOLUTION ORAL; RESPIRATORY (INHALATION) at 11:16

## 2023-05-09 RX ADMIN — SPIRONOLACTONE 25 MG: 25 TABLET ORAL at 08:29

## 2023-05-09 RX ADMIN — LAMOTRIGINE 25 MG: 25 TABLET ORAL at 21:04

## 2023-05-09 RX ADMIN — GUAIFENESIN 600 MG: 600 TABLET, EXTENDED RELEASE ORAL at 08:29

## 2023-05-09 RX ADMIN — BISMUTH SUBSALICYLATE 524 MG: 525 LIQUID ORAL at 08:28

## 2023-05-09 RX ADMIN — IPRATROPIUM BROMIDE AND ALBUTEROL SULFATE 3 ML: 2.5; .5 SOLUTION RESPIRATORY (INHALATION) at 04:50

## 2023-05-09 RX ADMIN — FUROSEMIDE 40 MG: 10 INJECTION, SOLUTION INTRAMUSCULAR; INTRAVENOUS at 11:40

## 2023-05-09 RX ADMIN — PANTOPRAZOLE SODIUM 20 MG: 20 TABLET, DELAYED RELEASE ORAL at 06:02

## 2023-05-09 RX ADMIN — INSULIN LISPRO 13 UNITS: 100 INJECTION, SOLUTION INTRAVENOUS; SUBCUTANEOUS at 16:15

## 2023-05-09 RX ADMIN — BISMUTH SUBSALICYLATE 524 MG: 525 LIQUID ORAL at 11:40

## 2023-05-09 RX ADMIN — Medication 1 TABLET: at 08:29

## 2023-05-09 RX ADMIN — GABAPENTIN 600 MG: 300 CAPSULE ORAL at 16:16

## 2023-05-09 RX ADMIN — GABAPENTIN 600 MG: 300 CAPSULE ORAL at 08:28

## 2023-05-09 RX ADMIN — TIZANIDINE 4 MG: 2 TABLET ORAL at 16:46

## 2023-05-09 RX ADMIN — BUDESONIDE 0.5 MG: 0.5 INHALANT ORAL at 07:40

## 2023-05-09 RX ADMIN — INSULIN LISPRO 13 UNITS: 100 INJECTION, SOLUTION INTRAVENOUS; SUBCUTANEOUS at 11:40

## 2023-05-09 RX ADMIN — IPRATROPIUM BROMIDE AND ALBUTEROL SULFATE 3 ML: 2.5; .5 SOLUTION RESPIRATORY (INHALATION) at 00:50

## 2023-05-09 RX ADMIN — BENZONATATE 100 MG: 100 CAPSULE ORAL at 16:15

## 2023-05-09 RX ADMIN — FLUTICASONE FUROATE AND VILANTEROL TRIFENATATE 1 PUFF: 100; 25 POWDER RESPIRATORY (INHALATION) at 08:31

## 2023-05-09 RX ADMIN — GABAPENTIN 600 MG: 300 CAPSULE ORAL at 20:47

## 2023-05-09 RX ADMIN — BISMUTH SUBSALICYLATE 524 MG: 525 LIQUID ORAL at 21:04

## 2023-05-09 RX ADMIN — BENZONATATE 100 MG: 100 CAPSULE ORAL at 08:29

## 2023-05-09 RX ADMIN — METFORMIN HYDROCHLORIDE 1000 MG: 500 TABLET ORAL at 16:15

## 2023-05-09 RX ADMIN — ATORVASTATIN CALCIUM 40 MG: 40 TABLET, FILM COATED ORAL at 16:16

## 2023-05-09 RX ADMIN — BUSPIRONE HYDROCHLORIDE 10 MG: 10 TABLET ORAL at 18:02

## 2023-05-09 RX ADMIN — OXYCODONE HYDROCHLORIDE AND ACETAMINOPHEN 1000 MG: 500 TABLET ORAL at 08:28

## 2023-05-09 RX ADMIN — INSULIN LISPRO 13 UNITS: 100 INJECTION, SOLUTION INTRAVENOUS; SUBCUTANEOUS at 08:34

## 2023-05-09 NOTE — PHYSICAL THERAPY NOTE
PT TREATMENT     05/09/23 1410   Note Type   Note Type Treatment   Pain Assessment   Pain Assessment Tool Mon-Baker FACES   Mon-Baker FACES Pain Rating 4  (LS area, chronic as per patient)   Restrictions/Precautions   Other Precautions Fall Risk;O2   General   Chart Reviewed Yes   Family/Caregiver Present No   Cognition   Arousal/Participation Cooperative   Subjective   Subjective Patient reports easily fatigued   Bed Mobility   Supine to Sit 7  Independent   Sit to Supine 7  Independent   Transfers   Sit to Stand 5  Supervision   Stand to Sit 5  Supervision   Toilet transfer 7  Independent   Additional items   (Patient required use of bathroom and demonstrated independence in toileting and hygiene)   Ambulation/Elevation   Gait Assistance 5  Supervision   Additional items Verbal cues   Assistive Device Rolling walker   Distance 30 feet x 1, 35 feet x 1 with change in direction and sitting rest period between activity  Gait steady but with slow pace and shortened stride length  Balance   Static Sitting Good   Dynamic Sitting Fair +   Static Standing Fair +   Dynamic Standing Fair   Ambulatory Fair   Activity Tolerance   Activity Tolerance Patient limited by fatigue   Exercises   Hamstring Stretch Sitting;5 reps;PROM; Bilateral   Quad Sets Supine;10 reps;Bilateral   Heelslides Supine;10 reps;Bilateral   Glute Sets Sitting;10 reps   Hip Flexion Sitting;10 reps;Bilateral  (Alternating)   Hip Abduction Sitting;10 reps;Bilateral  (Alternating)   Knee AROM Short Arc Quad Supine;10 reps;Bilateral   Knee AROM Long Arc Quad Sitting;10 reps;Bilateral  (Alternating)   Ankle Pumps Sitting;10 reps;Bilateral  (Alternating)   Marching Standing;10 reps;Bilateral   Balance training  Sidestepping and backward walking completed for balance and coordination    All activity completed with increased time requirements due to fatigue, shortness of breath and patient requesting resting times during session   Assessment   Assessment Patient cooperative and motivated and will benefit from continued physical therapy with progression as tolerated as well as follow-up with home PT upon discharge from the hospital  The patient's AM-PAC Basic Mobility Inpatient Short Form Raw Score is 21  A Raw score of greater than 16 suggests the patient may benefit from discharge to home  Please also refer to the recommendation of the Physical Therapist for safe discharge planning  Plan   Treatment/Interventions ADL retraining;Functional transfer training;LE strengthening/ROM; Therapeutic exercise; Endurance training;Patient/family training;Equipment eval/education;Gait training; Compensatory technique education   PT Frequency 3-5x/wk   Recommendation   PT Discharge Recommendation Home with home health rehabilitation   Additional Comments Patient has home health aide 5 days a week prior to admission   AM-PAC Basic Mobility Inpatient   Turning in Flat Bed Without Bedrails 4   Lying on Back to Sitting on Edge of Flat Bed Without Bedrails 4   Moving Bed to Chair 4   Standing Up From Chair Using Arms 4   Walk in Room 3   Climb 3-5 Stairs With Railing 2   Basic Mobility Inpatient Raw Score 21   Basic Mobility Standardized Score 45 55   Highest Level Of Mobility   JH-HLM Goal 6: Walk 10 steps or more   JH-HLM Achieved 7: Walk 25 feet or more   Education   Patient Demonstrates acceptance/verbal understanding;Explanation/teachback used;Demonstrates verbal understanding   Licensure   [place] License Number  Erum Mejía, PT 4 0 QA [id number]

## 2023-05-09 NOTE — CASE MANAGEMENT
Case Management Assessment & Discharge Planning Note    Patient name Mariluz Stoner  Location [address]  [place] [address] MRN [id number]  : 1960 Date 2023       Current Admission Date: 2023  Current Admission Diagnosis:Leukocytosis   Patient Active Problem List    Diagnosis Date Noted   • Hepatosplenomegaly 2023   • Leukocytosis 2023   • Pneumonia 2023   • Generalized weakness 2023   • Ambulatory dysfunction 2023   • Immunodeficiency, unspecified ([facility] ) 2022   • Hypo-osmolality and hyponatremia 2022   • Low back pain, unspecified 2022   • Chronic lymphocytic leukemia of B-cell type in remission ([facility] ) 2022   • Chronic obstructive pulmonary disease, unspecified ([facility] ) 2022   • Chronic pain syndrome 2022   • Foraminal stenosis of lumbar region 2022   • Lumbar post-laminectomy syndrome 2022   • Lumbar radiculopathy 2022   • Shortness of breath 2022   • SIRS (systemic inflammatory response syndrome) ([facility] ) 2022   • Dysuria 2021   • Other intervertebral disc degeneration, lumbar region 2021   • Bipolar disorder ([facility] ) 2021   • Hypertensive heart and chronic kidney disease with heart failure and stage 1 through stage 4 chronic kidney disease, or unspecified chronic kidney disease ([facility] ) 2021   • Paroxysmal atrial fibrillation ([facility] ) 2021   • Chronic respiratory failure with hypoxia ([facility] ) 2021   • Chronic kidney disease, stage 2 (mild) 2021   • Atherosclerotic heart disease of native coronary artery without angina pectoris 2021   • Peripheral neuropathy 2021   • Muscle weakness (generalized) 2021   • Platelets decreased ([facility] ) 2021   • Acute on chronic diastolic congestive heart failure ([facility] ) 2020   • Hyperlipidemia 10/29/2020   • Nutritional anemia, unspecified  10/29/2020   • Chest pain 10/11/2020   • Simple chronic bronchitis ([facility] ) 10/11/2020   • Hyperglycemia 05/01/2020   • Transition of care performed with sharing of clinical summary 04/11/2020   • Obstructive sleep apnea 02/02/2020   • Hyponatremia 02/02/2020   • COPD (chronic obstructive pulmonary disease) ([facility] ) 02/02/2020   • Chronic a-fib ([facility] ) 02/02/2020   • H/O vitamin D deficiency 10/28/2019   • Type 2 diabetes mellitus with complication, with long-term current use of insulin ([place] [address] ) 06/21/2019   • Restrictive ventilatory defect 03/26/2019   • 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 [facility]) 03/25/2019   • Lung disease, restrictive 11/21/2018   • Chronic respiratory failure ([place] [address] ) 10/31/2018   • Coronary artery disease 09/06/2017   • Esophageal reflux 09/06/2017   • ANA LILIA (acute kidney injury) ([place] [address] ) 03/20/2017   • Chronic low back pain 11/30/2016   • SHAVONNE and COPD overlap syndrome     • Morbid obesity     • Uncontrolled diabetes mellitus with hyperglycemia ([place] [address] ) 09/02/2016   • Fatigue 09/02/2016   • GERD 06/08/2016   • Cough 01/13/2016   • Psychiatric disorder    • 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/25/2015   • Erectile dysfunction of organic origin 08/25/2014   • Essential hypertension 09/04/2012   • Diabetic neuropathy ([facility] ) 09/04/2012   • Panic disorder without agoraphobia 09/04/2012   • Vitamin D deficiency 09/04/2012      LOS (days): 4  Geometric Mean LOS (GMLOS) (days): 2 90  Days to GMLOS:-0 8     OBJECTIVE:    Risk of Unplanned Readmission Score: 39 07     Current admission status: Inpatient    Preferred Pharmacy:   [address] [facility] [address]  [address] [place] [address]  Phone: [phone] Fax: [phone]    Primary Care Provider: MANAS Payne    Primary Insurance: [facility]  Secondary Insurance:     ASSESSMENT:  Jess Burns Proxies    There are no active Health Care Proxies on file         Advance Directives  Does patient have a Health Care POA?: Yes  Does patient have Advance Directives?: Yes  Advance Directives: Living will, Power of  for health care  Primary Contact: Regi Kay    Readmission Root Cause  30 Day Readmission: No    Patient Information  Admitted from[de-identified] Home  Mental Status: Alert  During Assessment patient was accompanied by: Not accompanied during assessment  Assessment information provided by[de-identified] Patient  Primary Caregiver: Self  Support Systems: Saji Vides, Home care staff  [place] of Residence: [address] [place] [address] do you live in?: [address] [facility] entry access options  Select all that apply : Elevator  Type of Current Residence: Apartment  Floor Level: 2  Upon entering residence, is there a bedroom on the main floor (no further steps)?: Yes  Upon entering residence, is there a bathroom on the main floor (no further steps)?: Yes  In the last 12 months, was there a time when you were not able to pay the mortgage or rent on time?: No  In the last 12 months, how many places have you lived?: 1  In the last 12 months, was there a time when you did not have a steady place to sleep or slept in a shelter (including now)?: No  Homeless/housing insecurity resource given?: N/A  Living Arrangements: Lives Alone    Activities of Daily Living Prior to Admission  Functional Status: Independent  Completes ADLs independently?: Yes  Ambulates independently?: Yes (uses electric scooter)  Does patient use assisted devices?: Yes  Assisted Devices (DME) used: Walker, Home Oxygen concentrator, Portable Oxygen concentrator, Other (Comment), Nebulizer (electric scooter)  DME Company Name (respiratory supplies):  [employer]  Does patient currently own DME?: Yes  What DME does the patient currently own?: Macario Zhu, Walker, Portable Oxygen concentrator, Home Oxygen concentrator, Nebulizer, Other (Comment) (electric scooter)  Does patient have a history of Outpatient Therapy (PT/OT)?: No  Does the patient have a history of Short-Term Rehab?: Yes ([facility])  Does patient have a history of HHC?: Yes ([facility])  Does patient currently have [address]?: Yes    Current Home Health Care  Type of Current Home Care Services: Home health aide ([facility] 5 days per week  3-4 hours per day)    Patient Information Continued  Income Source: SSI/SSD  Does patient have prescription coverage?: Yes ([facility])  Within the past 12 months, you worried that your food would run out before you got the money to buy more : Never true  Within the past 12 months, the food you bought just didn't last and you didn't have money to get more : Never true  Food insecurity resource given?: N/A  Does patient receive dialysis treatments?: No  Does patient have a history of substance abuse?: No  Does patient have a history of Mental Health Diagnosis?: Yes (Bipolar Disorder)    Means of Transportation  Means of Transport to [facility][de-identified] Other (Comment) ([address] if needed, currently receives [facility] or Virtual doctor visits)  In the past 12 months, has lack of transportation kept you from medical appointments or from getting medications?: No  In the past 12 months, has lack of transportation kept you from meetings, work, or from getting things needed for daily living?: No  Was application for public transport provided?: N/A      DISCHARGE DETAILS:    Discharge planning discussed with[de-identified] Patient and friendHuy Mess of Choice: Yes  Comments - Freedom of Choice: SW met with pt and spoke friend/POA over phone to assess needs and discuss plans  Home with home care is being recommended  Both pt and friend are in agreement with recommendation and open to services  Pt requested referral be made to [facility] due to previous experience with agency  Pt also expressed that he feels he has made some mistakes with his banking and requested assistance in correcting issue  SW offered resources through [employer]   Pt said he has talked with support person at [place] and would prefer to continue working with them  Per chart pt has working with Renetta Vick in [address] in the past   Referral to [facility] will be made  CM contacted family/caregiver?: Yes  Were Treatment Team discharge recommendations reviewed with patient/caregiver?: Yes  Did patient/caregiver verbalize understanding of patient care needs?: Yes  Were patient/caregiver advised of the risks associated with not following Treatment Team discharge recommendations?: Yes    Contacts  Patient Contacts: Aurelio Barthel (friend/POA)  Relationship to Patient[de-identified] Friend  Contact Method: Phone  Phone Number: [phone]  Reason/Outcome: Discharge [address]         Is the patient interested in [facility] at discharge?: Yes  [address] requested[de-identified] Nursing, Occupational Therapy, Physical [address] Name[de-identified] [address] Provider[de-identified] PCP  [address] Needed[de-identified] COPD Management, Evaluate Functional Status and Safety, Gait/ADL Training, Oxygen Via Nasal Cannula, Strengthening/Theraputic Exercises to Improve Function  Homebound Criteria Met[de-identified] Requires the Assistance of Another Person for Safe Ambulation or to Leave the Home, Uses an Assist Device (i e  cane, walker, etc)  Supporting Clincal Findings[de-identified] Limited Endurance, Requires Oxygen, Fatigues Easliy in Short Distances    DME Referral Provided  Referral made for DME?: No    Other Referral/Resources/Interventions Provided:  Interventions: C, Outpatient   Referral Comments: Referral made to [facility] for [facility] care services  In-basket message sent to Outpatient Care Coordinator regarding pt's request for assistance with correcting banking issues      Treatment Team Recommendation: Home with [address]  Discharge Destination Plan[de-identified] Home with [place] at Discharge : [facility]

## 2023-05-09 NOTE — ASSESSMENT & PLAN NOTE
Noted on CTAP  Associated with leukocytosis  DDx include infection vs hematological etiology    - trend WBC and fever curve

## 2023-05-09 NOTE — ASSESSMENT & PLAN NOTE
Acute, worsening     Pt w/ PMH leukemia has increasing leukocytes, despite antibiotic treatment with azithromycin and ceftriaxone for pna   WBC increased to 24 74     · Continue to trend WBC   · Heme/onc consulted-

## 2023-05-09 NOTE — PROGRESS NOTES
[facility] Practice Progress Note - Deng Body 60 y o  male MRN: [id number]    Unit/Bed#: 2 [address] 202 Encounter: [id number]      Assessment/Plan:  * Leukocytosis  Assessment & Plan  Acute     Pt w/ PMH leukemia has increasing leukocytes, despite antibiotic treatment with azithromycin and ceftriaxone for pna  · Continue to trend WBC   · Heme/onc consulted     Pneumonia  Assessment & Plan  Acute    Evidenced by cough and congestion, supported by increasing WBC with unremarkable CXR  This morning WBC was 19 02  CT chest/abd done on 5/8 unremarkable  Completed 3-day course of azithromycin 500 mg  · Trend WBC  · Continue Ceftriaxone 1g daily  · Robitussin Dm for Cough    · Mucinex BID for congestion   · Mucomyst BID   · Incentive spirometry- flutter   · Currently on 3L (home O2)    Hyponatremia  Assessment & Plan  Acute     Potassium decreased from normal range to 128 yesterday and continued to decrease to 123 after initiating fluids  Fluids were halted and pt given 40 mg lasix  This morning, Na is 128  Will give 40 mg lasix and continue monitoring strict I's and O's  · Lasix 40 mg once this morning   · Fluid restriction at 1800 mL  · Trend Na    Chronic respiratory failure (HCC)  Assessment & Plan  Chronic    In the setting of COPD, as evidenced by baseline dependence on O2 at 3L  Follows with pulmonology as outpt, chronically requires O2 @ 3L baseline  · Continue supplemental O2 with titration to maintain sat >90%  · Continue to monitor oxygenation status      Uncontrolled diabetes mellitus with hyperglycemia (HCC)  Assessment & Plan  Lab Results   Component Value Date    HGBA1C 11 6 (H) 01/19/2023     Pt with hx on uncontrolled DM 2  Last A1c 11 6 in 1/23  Home medications include metformin, liraglutide, lantus 70U BID, Aspart 35U TID        · F/u Repeat A1c   · Lantus 40 BID and titrate up as needed   · Lispro 13 U TID with meals, titrate up as needed   · Continue to monitor glucose · Continue metformin 1000 mg BID    Hepatosplenomegaly  Assessment & Plan  Noted on CTAP  Associated with leukocytosis  DDx include infection vs hematological etiology  - trend WBC and fever curve    Ambulatory dysfunction  Assessment & Plan  Pt uses scooter to ambulate     · Fall precautions  · PT/OT     Generalized weakness  Assessment & Plan  Pt presents with generalized weakness for 1 week since he has been sick  · Fall precautions   · PT/OT   · Continue to monitor     Chronic obstructive pulmonary disease, unspecified ([facility] )  Assessment & Plan  Hx of COPD and follows with Pulmonology outpt  Home O2 of 3L   Pt currently on 3L on admission with O2 sat > 90%  · Continue Breo Ellipta and duonebs q4 scheduled  · Continue Pulmicort every 12 hours  · Continue Albuterol as needed for SOB   · Titrate O2 as needed     Chronic lymphocytic leukemia of B-cell type in remission ([facility] )  Assessment & Plan  Hx of CLL  Pt unable to relay history regarding time of diagnosis  · No treatment at the moment     Low back pain, unspecified  Assessment & Plan  Chronic, stable   Pt follows with pain management outpt and received medial branch block in 3/23  · Continue tizanidine as needed for muscle spasms   · Given 1 dose of 15 mg toradol on 5/7 and 5/8    Paroxysmal atrial fibrillation (HCC)  Assessment & Plan  Pt with hx of atrial fibrillation  Rate on admission 80  · Telemetry   · Continue metoprolol, eliquis and digoxin     Bipolar disorder (HCC)  Assessment & Plan  Stable     · Continue home medications : sertraline, quetiapine, buspar and lamictal     Hyperlipidemia  Assessment & Plan  Increased Lipitor from 20 mg to 40 mg  Continue 40 mg Lipitor daily  Obstructive sleep apnea  Assessment & Plan  Pt has hx of mild to moderate SHAVONNE with good compliance to his Bipap with settings ( 12/5, 3L)     · Continue bipap     Essential hypertension  Assessment & Plan  BP on admission 159/84       · Continue aldactone, "metoprolol and losartan daily  · Monitor VS     Esophageal reflux  Assessment & Plan  Stable     · Continue PPI     Coronary artery disease  Assessment & Plan  Hx of CAD  Unknown history of cardiac stents  Home medications ASA, lipitor and metoprolol       - continue home medications       Subjective:   Pt seen and examined at bedside  Pt states that he feels better with his congestion and breathing  Reports that he has back pain still and was scheduled to get an epidural for pain management today but unable to go there today since he is admitted  Denies any other acute complaints at this time  Objective:     Vitals: Blood pressure 103/72, pulse 77, temperature 98 8 °F (37 1 °C), resp  rate 16, height 5' 11\" (1 803 m), weight 115 kg (254 lb 9 6 oz), SpO2 95 %  ,Body mass index is 35 51 kg/m²  Wt Readings from Last 3 Encounters:   05/09/23 115 kg (254 lb 9 6 oz)   04/19/23 113 kg (250 lb)   03/14/23 112 kg (248 lb)       Intake/Output Summary (Last 24 hours) at 5/9/2023 1015  Last data filed at 5/9/2023 0758  Gross per 24 hour   Intake 600 ml   Output 1650 ml   Net -1050 ml       Physical Exam  Constitutional:       Appearance: Normal appearance  HENT:      Head: Normocephalic and atraumatic  Mouth/Throat:      Mouth: Mucous membranes are moist    Eyes:      General:         Right eye: No discharge  Left eye: No discharge  Conjunctiva/sclera: Conjunctivae normal    Cardiovascular:      Rate and Rhythm: Normal rate and regular rhythm  Pulses: Normal pulses  Heart sounds: Normal heart sounds  Pulmonary:      Effort: Pulmonary effort is normal  No respiratory distress  Breath sounds: Normal breath sounds  Abdominal:      General: Bowel sounds are normal       Palpations: Abdomen is soft  Tenderness: There is no abdominal tenderness  Musculoskeletal:      Cervical back: Neck supple  Right lower leg: No edema  Left lower leg: No edema     Skin:     General: Skin " is warm and dry  Capillary Refill: Capillary refill takes less than 2 seconds  Neurological:      Mental Status: He is alert             Recent Results (from the past 24 hour(s))   Fingerstick Glucose (POCT)    Collection Time: 05/08/23 11:15 AM   Result Value Ref Range    POC Glucose 185 (H) 65 - 140 mg/dl   Basic metabolic panel    Collection Time: 05/08/23  2:40 PM   Result Value Ref Range    Sodium 123 (L) 135 - 147 mmol/L    Potassium 5 3 3 5 - 5 3 mmol/L    Chloride 92 (L) 96 - 108 mmol/L    CO2 23 21 - 32 mmol/L    ANION GAP 8 4 - 13 mmol/L    BUN 9 5 - 25 mg/dL    Creatinine 0 75 0 60 - 1 30 mg/dL    Glucose 201 (H) 65 - 140 mg/dL    Calcium 8 8 8 4 - 10 2 mg/dL    eGFR 97 ml/min/1 73sq m   Fingerstick Glucose (POCT)    Collection Time: 05/08/23  3:47 PM   Result Value Ref Range    POC Glucose 185 (H) 65 - 140 mg/dl   Basic metabolic panel    Collection Time: 05/08/23  8:36 PM   Result Value Ref Range    Sodium 128 (L) 135 - 147 mmol/L    Potassium 4 2 3 5 - 5 3 mmol/L    Chloride 90 (L) 96 - 108 mmol/L    CO2 26 21 - 32 mmol/L    ANION GAP 12 4 - 13 mmol/L    BUN 9 5 - 25 mg/dL    Creatinine 0 81 0 60 - 1 30 mg/dL    Glucose 138 65 - 140 mg/dL    Calcium 9 4 8 4 - 10 2 mg/dL    eGFR 94 ml/min/1 73sq m   Fingerstick Glucose (POCT)    Collection Time: 05/08/23  8:36 PM   Result Value Ref Range    POC Glucose 239 (H) 65 - 140 mg/dl   CBC and differential    Collection Time: 05/09/23  6:17 AM   Result Value Ref Range    WBC 20 71 (H) 4 31 - 10 16 Thousand/uL    RBC 4 29 3 88 - 5 62 Million/uL    Hemoglobin 13 9 12 0 - 17 0 g/dL    Hematocrit 39 2 36 5 - 49 3 %    MCV 91 82 - 98 fL    MCH 32 4 26 8 - 34 3 pg    MCHC 35 5 31 4 - 37 4 g/dL    RDW 12 5 11 6 - 15 1 %    MPV 9 3 8 9 - 12 7 fL    Platelets 769 228 - 562 Thousands/uL   Basic metabolic panel    Collection Time: 05/09/23  6:17 AM   Result Value Ref Range    Sodium 128 (L) 135 - 147 mmol/L    Potassium 3 9 3 5 - 5 3 mmol/L    Chloride 92 (L) 96 - 108 mmol/L    CO2 25 21 - 32 mmol/L    ANION GAP 11 4 - 13 mmol/L    BUN 9 5 - 25 mg/dL    Creatinine 0 68 0 60 - 1 30 mg/dL    Glucose 132 65 - 140 mg/dL    Calcium 9 1 8 4 - 10 2 mg/dL    eGFR 101 ml/min/1 73sq m   Magnesium    Collection Time: 05/09/23  6:17 AM   Result Value Ref Range    Magnesium 1 8 (L) 1 9 - 2 7 mg/dL   Manual Differential(PHLEBS Do Not Order)    Collection Time: 05/09/23  6:17 AM   Result Value Ref Range    Segmented % 28 (L) 43 - 75 %    Lymphocytes % 68 (H) 14 - 44 %    Monocytes % 2 (L) 4 - 12 %    Eosinophils, % 1 0 - 6 %    Basophils % 0 0 - 1 %    Myelocytes % 1 0 - 1 %    Absolute Neutrophils 5 80 1 85 - 7 62 Thousand/uL    Lymphocytes Absolute 14 08 (H) 0 60 - 4 47 Thousand/uL    Monocytes Absolute 0 41 0 00 - 1 22 Thousand/uL    Eosinophils Absolute 0 21 0 00 - 0 40 Thousand/uL    Basophils Absolute 0 00 0 00 - 0 10 Thousand/uL    Total Counted      Smudge Cells Present     RBC Morphology Normal     Platelet Estimate Borderline (A) Adequate   Fingerstick Glucose (POCT)    Collection Time: 05/09/23  7:15 AM   Result Value Ref Range    POC Glucose 132 65 - 140 mg/dl       Current Facility-Administered Medications   Medication Dose Route Frequency Provider Last Rate Last Admin   • acetaminophen (TYLENOL) tablet 650 mg  650 mg Oral Q6H PRN Marcia Doshi MD   650 mg at 05/06/23 2040   • acetylcysteine (MUCOMYST) 200 mg/mL inhalation solution 600 mg  3 mL Nebulization BID Eduardo Cabral MD       • apixaban (ELIQUIS) tablet 5 mg  5 mg Oral BID Valerie Patino MD   5 mg at 05/09/23 7248   • ascorbic acid (VITAMIN C) tablet 1,000 mg  1,000 mg Oral Daily Valerie Patino MD   1,000 mg at 05/09/23 0516   • aspirin (ECOTRIN LOW STRENGTH) EC tablet 81 mg  81 mg Oral Daily Marcia Doshi MD   81 mg at 05/09/23 9790   • atorvastatin (LIPITOR) tablet 40 mg  40 mg Oral Daily With Klesey Teague MD   40 mg at 05/08/23 1655   • benzonatate (TESSALON PERLES) capsule 100 mg  100 mg Oral TID Ziyad Shankar MD   100 mg at 05/09/23 9742   • bismuth subsalicylate (PEPTO BISMOL) oral suspension 524 mg  524 mg Oral 4x Daily Justin Ponce MD   524 mg at 05/09/23 0828   • budesonide (PULMICORT) inhalation solution 0 5 mg  0 5 mg Nebulization Q12H Balaji Billy MD   0 5 mg at 05/09/23 0740   • busPIRone (BUSPAR) tablet 10 mg  10 mg Oral BID Justin Ponce MD   10 mg at 05/09/23 0829   • calcium carbonate (TUMS) chewable tablet 1,000 mg  1,000 mg Oral Daily PRN Justin Ponce MD       • cefTRIAXone (ROCEPHIN) IVPB (premix in dextrose) 1,000 mg 50 mL  1,000 mg Intravenous Q24H Justin Ponce  mL/hr at 05/1959 1,000 mg at 05/1959   • cholecalciferol (VITAMIN D3) tablet 1,000 Units  1,000 Units Oral Daily Justin Ponce MD   1,000 Units at 05/09/23 5144   • digoxin (LANOXIN) tablet 250 mcg  250 mcg Oral Daily Justin Ponce MD   250 mcg at 05/09/23 6231   • fish oil capsule 1,000 mg  1,000 mg Oral Daily Justin Ponce MD   1,000 mg at 05/09/23 0829   • fluticasone (FLONASE) 50 mcg/act nasal spray 1 spray  1 spray Each Nare Daily Shadi Buck MD   1 spray at 05/09/23 0831   • Fluticasone Furoate-Vilanterol 100-25 mcg/actuation 1 puff  1 puff Inhalation Daily Justin Ponce MD   1 puff at 05/09/23 0831   • furosemide (LASIX) injection 40 mg  40 mg Intravenous Once Shadi Buck MD       • gabapentin (NEURONTIN) capsule 600 mg  600 mg Oral TID Justin Ponce MD   600 mg at 05/09/23 0828   • guaiFENesin (MUCINEX) 12 hr tablet 600 mg  600 mg Oral Q12H [address] Ziyad Shankar MD   600 mg at 05/09/23 0829   • insulin glargine (LANTUS) subcutaneous injection 40 Units 0 4 mL  40 Units Subcutaneous BID Balaji Billy MD   40 Units at 05/09/23 0829   • insulin lispro (HumaLOG) 100 units/mL subcutaneous injection 13 Units  13 Units Subcutaneous TID With Meals Balaji Billy MD   13 Units at 05/09/23 0834   • ipratropium-albuterol (DUO-NEB) 0 5-2 5 mg/3 mL inhalation solution 3 mL  3 mL Nebulization Q4H Jami Jade MD   3 mL at 05/09/23 0740   • lamoTRIgine (LaMICtal) tablet 25 mg  25 mg Oral HS Justin Ponce MD   25 mg at 05/08/23 2104   • losartan (COZAAR) tablet 50 mg  50 mg Oral Daily Justin Ponce MD   50 mg at 05/09/23 7345   • magnesium sulfate 2 g/50 mL IVPB (premix) 2 g  2 g Intravenous Once Shadi Buck MD 25 mL/hr at 05/09/23 1005 2 g at 05/09/23 1005   • menthol-methyl salicylate (BENGAY) 46-52 % cream   Apply externally 4x Daily PRN Ziyad Shankar MD       • metFORMIN (GLUCOPHAGE) tablet 1,000 mg  1,000 mg Oral BID With Meals Ziyad Shankar MD   1,000 mg at 05/09/23 0829   • metoprolol succinate (TOPROL-XL) 24 hr tablet 200 mg  200 mg Oral Daily Justin Ponce MD   200 mg at 05/09/23 0829   • multivitamin-minerals (CENTRUM) tablet 1 tablet  1 tablet Oral Daily Justin Ponce MD   1 tablet at 05/09/23 0829   • ondansetron (ZOFRAN) injection 4 mg  4 mg Intravenous Q6H PRN Justin Ponce MD       • pantoprazole (PROTONIX) EC tablet 20 mg  20 mg Oral Early Morning Justin Ponce MD   20 mg at 05/09/23 0602   • QUEtiapine (SEROquel XR) 24 hr tablet 100 mg  100 mg Oral QAM Justin Ponce MD   100 mg at 05/09/23 1772   • QUEtiapine (SEROquel) tablet 300 mg  300 mg Oral HS Justin Ponce MD   300 mg at 05/08/23 2104   • sertraline (ZOLOFT) tablet 50 mg  50 mg Oral Daily Justin Ponce MD   50 mg at 05/09/23 9257   • spironolactone (ALDACTONE) tablet 25 mg  25 mg Oral Daily Justin Ponce MD   25 mg at 05/09/23 4344   • tiZANidine (ZANAFLEX) tablet 4 mg  4 mg Oral Q8H PRN Justin Ponce MD   4 mg at 05/09/23 0828       Invasive Devices     Peripheral Intravenous Line  Duration           Peripheral IV 05/05/23 Left Arm 3 days                Lab, Imaging and other studies: I have personally reviewed pertinent reports      VTE Pharmacologic Prophylaxis: Eliquis   VTE Mechanical Prophylaxis: sequential compression device    Shanell Bush MD

## 2023-05-10 LAB
ANION GAP SERPL CALCULATED.3IONS-SCNC: 11 MMOL/L (ref 4–13)
BUN SERPL-MCNC: 9 MG/DL (ref 5–25)
CALCIUM SERPL-MCNC: 9.1 MG/DL (ref 8.4–10.2)
CHLORIDE SERPL-SCNC: 95 MMOL/L (ref 96–108)
CO2 SERPL-SCNC: 23 MMOL/L (ref 21–32)
CREAT SERPL-MCNC: 0.69 MG/DL (ref 0.6–1.3)
ERYTHROCYTE [DISTWIDTH] IN BLOOD BY AUTOMATED COUNT: 12.5 % (ref 11.6–15.1)
GFR SERPL CREATININE-BSD FRML MDRD: 101 ML/MIN/1.73SQ M
GLUCOSE SERPL-MCNC: 123 MG/DL (ref 65–140)
GLUCOSE SERPL-MCNC: 127 MG/DL (ref 65–140)
GLUCOSE SERPL-MCNC: 151 MG/DL (ref 65–140)
GLUCOSE SERPL-MCNC: 159 MG/DL (ref 65–140)
GLUCOSE SERPL-MCNC: 161 MG/DL (ref 65–140)
GLUCOSE SERPL-MCNC: 176 MG/DL (ref 65–140)
HCT VFR BLD AUTO: 38.9 % (ref 36.5–49.3)
HGB BLD-MCNC: 13.7 G/DL (ref 12–17)
MAGNESIUM SERPL-MCNC: 1.8 MG/DL (ref 1.9–2.7)
MCH RBC QN AUTO: 32.4 PG (ref 26.8–34.3)
MCHC RBC AUTO-ENTMCNC: 35.2 G/DL (ref 31.4–37.4)
MCV RBC AUTO: 92 FL (ref 82–98)
PLATELET # BLD AUTO: 158 THOUSANDS/UL (ref 149–390)
PMV BLD AUTO: 8.9 FL (ref 8.9–12.7)
POTASSIUM SERPL-SCNC: 4.3 MMOL/L (ref 3.5–5.3)
RBC # BLD AUTO: 4.23 MILLION/UL (ref 3.88–5.62)
SODIUM SERPL-SCNC: 129 MMOL/L (ref 135–147)
WBC # BLD AUTO: 20.34 THOUSAND/UL (ref 4.31–10.16)

## 2023-05-10 RX ORDER — MAGNESIUM SULFATE HEPTAHYDRATE 40 MG/ML
2 INJECTION, SOLUTION INTRAVENOUS ONCE
Status: COMPLETED | OUTPATIENT
Start: 2023-05-10 | End: 2023-05-10

## 2023-05-10 RX ORDER — FUROSEMIDE 10 MG/ML
40 INJECTION INTRAMUSCULAR; INTRAVENOUS ONCE
Status: COMPLETED | OUTPATIENT
Start: 2023-05-10 | End: 2023-05-10

## 2023-05-10 RX ORDER — KETOROLAC TROMETHAMINE 30 MG/ML
15 INJECTION, SOLUTION INTRAMUSCULAR; INTRAVENOUS DAILY PRN
Status: DISCONTINUED | OUTPATIENT
Start: 2023-05-10 | End: 2023-05-11 | Stop reason: HOSPADM

## 2023-05-10 RX ADMIN — BENZONATATE 100 MG: 100 CAPSULE ORAL at 16:17

## 2023-05-10 RX ADMIN — IPRATROPIUM BROMIDE AND ALBUTEROL SULFATE 3 ML: 2.5; .5 SOLUTION RESPIRATORY (INHALATION) at 07:12

## 2023-05-10 RX ADMIN — BUDESONIDE 0.5 MG: 0.5 INHALANT ORAL at 07:12

## 2023-05-10 RX ADMIN — BISMUTH SUBSALICYLATE 524 MG: 525 LIQUID ORAL at 08:15

## 2023-05-10 RX ADMIN — ATORVASTATIN CALCIUM 40 MG: 40 TABLET, FILM COATED ORAL at 16:17

## 2023-05-10 RX ADMIN — QUETIAPINE FUMARATE 300 MG: 100 TABLET ORAL at 21:51

## 2023-05-10 RX ADMIN — INSULIN LISPRO 15 UNITS: 100 INJECTION, SOLUTION INTRAVENOUS; SUBCUTANEOUS at 08:52

## 2023-05-10 RX ADMIN — GABAPENTIN 600 MG: 300 CAPSULE ORAL at 16:17

## 2023-05-10 RX ADMIN — TIZANIDINE 4 MG: 2 TABLET ORAL at 13:34

## 2023-05-10 RX ADMIN — INSULIN LISPRO 15 UNITS: 100 INJECTION, SOLUTION INTRAVENOUS; SUBCUTANEOUS at 11:51

## 2023-05-10 RX ADMIN — MAGNESIUM SULFATE HEPTAHYDRATE 2 G: 40 INJECTION, SOLUTION INTRAVENOUS at 10:19

## 2023-05-10 RX ADMIN — Medication 1 TABLET: at 08:17

## 2023-05-10 RX ADMIN — MAGNESIUM SULFATE HEPTAHYDRATE 2 G: 40 INJECTION, SOLUTION INTRAVENOUS at 08:59

## 2023-05-10 RX ADMIN — APIXABAN 5 MG: 5 TABLET, FILM COATED ORAL at 18:10

## 2023-05-10 RX ADMIN — OXYCODONE HYDROCHLORIDE AND ACETAMINOPHEN 1000 MG: 500 TABLET ORAL at 08:17

## 2023-05-10 RX ADMIN — LAMOTRIGINE 25 MG: 25 TABLET ORAL at 21:51

## 2023-05-10 RX ADMIN — KETOROLAC TROMETHAMINE 15 MG: 30 INJECTION, SOLUTION INTRAMUSCULAR; INTRAVENOUS at 09:00

## 2023-05-10 RX ADMIN — TIZANIDINE 4 MG: 2 TABLET ORAL at 04:23

## 2023-05-10 RX ADMIN — GABAPENTIN 600 MG: 300 CAPSULE ORAL at 21:52

## 2023-05-10 RX ADMIN — ACETYLCYSTEINE 600 MG: 200 SOLUTION ORAL; RESPIRATORY (INHALATION) at 07:12

## 2023-05-10 RX ADMIN — SPIRONOLACTONE 25 MG: 25 TABLET ORAL at 08:17

## 2023-05-10 RX ADMIN — BUSPIRONE HYDROCHLORIDE 10 MG: 10 TABLET ORAL at 18:10

## 2023-05-10 RX ADMIN — FUROSEMIDE 40 MG: 10 INJECTION, SOLUTION INTRAMUSCULAR; INTRAVENOUS at 10:20

## 2023-05-10 RX ADMIN — APIXABAN 5 MG: 5 TABLET, FILM COATED ORAL at 08:16

## 2023-05-10 RX ADMIN — QUETIAPINE FUMARATE 100 MG: 50 TABLET, EXTENDED RELEASE ORAL at 08:51

## 2023-05-10 RX ADMIN — METFORMIN HYDROCHLORIDE 1000 MG: 500 TABLET ORAL at 08:16

## 2023-05-10 RX ADMIN — ASPIRIN 81 MG: 81 TABLET, COATED ORAL at 08:17

## 2023-05-10 RX ADMIN — OMEGA-3 FATTY ACIDS CAP 1000 MG 1000 MG: 1000 CAP at 08:17

## 2023-05-10 RX ADMIN — BISMUTH SUBSALICYLATE 524 MG: 525 LIQUID ORAL at 18:09

## 2023-05-10 RX ADMIN — SERTRALINE HYDROCHLORIDE 50 MG: 50 TABLET ORAL at 08:17

## 2023-05-10 RX ADMIN — BENZONATATE 100 MG: 100 CAPSULE ORAL at 21:52

## 2023-05-10 RX ADMIN — IPRATROPIUM BROMIDE AND ALBUTEROL SULFATE 3 ML: 2.5; .5 SOLUTION RESPIRATORY (INHALATION) at 16:42

## 2023-05-10 RX ADMIN — ACETYLCYSTEINE 600 MG: 200 SOLUTION ORAL; RESPIRATORY (INHALATION) at 19:10

## 2023-05-10 RX ADMIN — FLUTICASONE PROPIONATE 1 SPRAY: 50 SPRAY, METERED NASAL at 08:51

## 2023-05-10 RX ADMIN — GUAIFENESIN 600 MG: 600 TABLET, EXTENDED RELEASE ORAL at 21:52

## 2023-05-10 RX ADMIN — IPRATROPIUM BROMIDE AND ALBUTEROL SULFATE 3 ML: 2.5; .5 SOLUTION RESPIRATORY (INHALATION) at 00:51

## 2023-05-10 RX ADMIN — BISMUTH SUBSALICYLATE 524 MG: 525 LIQUID ORAL at 11:51

## 2023-05-10 RX ADMIN — BUSPIRONE HYDROCHLORIDE 10 MG: 10 TABLET ORAL at 08:17

## 2023-05-10 RX ADMIN — GUAIFENESIN 600 MG: 600 TABLET, EXTENDED RELEASE ORAL at 08:16

## 2023-05-10 RX ADMIN — DIGOXIN 250 MCG: 0.12 TABLET ORAL at 08:22

## 2023-05-10 RX ADMIN — BISMUTH SUBSALICYLATE 524 MG: 525 LIQUID ORAL at 21:52

## 2023-05-10 RX ADMIN — METOPROLOL SUCCINATE 200 MG: 100 TABLET, EXTENDED RELEASE ORAL at 08:16

## 2023-05-10 RX ADMIN — CHOLECALCIFEROL TAB 25 MCG (1000 UNIT) 1000 UNITS: 25 TAB at 08:17

## 2023-05-10 RX ADMIN — BENZONATATE 100 MG: 100 CAPSULE ORAL at 08:17

## 2023-05-10 RX ADMIN — IPRATROPIUM BROMIDE AND ALBUTEROL SULFATE 3 ML: 2.5; .5 SOLUTION RESPIRATORY (INHALATION) at 19:10

## 2023-05-10 RX ADMIN — BUDESONIDE 0.5 MG: 0.5 INHALANT ORAL at 19:10

## 2023-05-10 RX ADMIN — PANTOPRAZOLE SODIUM 20 MG: 20 TABLET, DELAYED RELEASE ORAL at 05:28

## 2023-05-10 RX ADMIN — IPRATROPIUM BROMIDE AND ALBUTEROL SULFATE 3 ML: 2.5; .5 SOLUTION RESPIRATORY (INHALATION) at 04:43

## 2023-05-10 RX ADMIN — INSULIN GLARGINE 40 UNITS: 100 INJECTION, SOLUTION SUBCUTANEOUS at 21:52

## 2023-05-10 RX ADMIN — IPRATROPIUM BROMIDE AND ALBUTEROL SULFATE 3 ML: 2.5; .5 SOLUTION RESPIRATORY (INHALATION) at 12:59

## 2023-05-10 RX ADMIN — INSULIN GLARGINE 40 UNITS: 100 INJECTION, SOLUTION SUBCUTANEOUS at 08:11

## 2023-05-10 RX ADMIN — FLUTICASONE FUROATE AND VILANTEROL TRIFENATATE 1 PUFF: 100; 25 POWDER RESPIRATORY (INHALATION) at 08:19

## 2023-05-10 RX ADMIN — METFORMIN HYDROCHLORIDE 1000 MG: 500 TABLET ORAL at 16:17

## 2023-05-10 RX ADMIN — GABAPENTIN 600 MG: 300 CAPSULE ORAL at 08:17

## 2023-05-10 RX ADMIN — INSULIN LISPRO 15 UNITS: 100 INJECTION, SOLUTION INTRAVENOUS; SUBCUTANEOUS at 16:16

## 2023-05-10 RX ADMIN — LOSARTAN POTASSIUM 50 MG: 50 TABLET, FILM COATED ORAL at 08:16

## 2023-05-10 NOTE — PLAN OF CARE
Problem: RESPIRATORY - ADULT  Goal: Achieves optimal ventilation and oxygenation  Description: INTERVENTIONS:  - Assess for changes in respiratory status  - Assess for changes in mentation and behavior  - Position to facilitate oxygenation and minimize respiratory effort  - Oxygen administered by appropriate delivery if ordered  - Initiate smoking cessation education as indicated  - Encourage broncho-pulmonary hygiene including cough, deep breathe, Incentive Spirometry  - Assess the need for suctioning and aspirate as needed  - Assess and instruct to report SOB or any respiratory difficulty  - Respiratory Therapy support as indicated  Outcome: Progressing     Problem: INFECTION - ADULT  Goal: Absence or prevention of progression during hospitalization  Description: INTERVENTIONS:  - Assess and monitor for signs and symptoms of infection  - Monitor lab/diagnostic results  - Monitor all insertion sites, i e  indwelling lines, tubes, and drains  - Monitor endotracheal if appropriate and nasal secretions for changes in amount and color  - [place] appropriate cooling/warming therapies per order  - Administer medications as ordered  - Instruct and encourage patient and family to use good hand hygiene technique  - Identify and instruct in appropriate isolation precautions for identified infection/condition  Outcome: Progressing  Goal: Absence of fever/infection during neutropenic period  Description: INTERVENTIONS:  - Monitor WBC    Outcome: Progressing     Problem: SAFETY ADULT  Goal: Patient will remain free of falls  Description: INTERVENTIONS:  - Educate patient/family on patient safety including physical limitations  - Instruct patient to call for assistance with activity   - Consult OT/PT to assist with strengthening/mobility   - Keep Call bell within reach  - Keep bed low and locked with side rails adjusted as appropriate  - Keep care items and personal belongings within reach  - Initiate and maintain comfort rounds  - Make Fall Risk Sign visible to staff  - Offer Toileting every 2 Hours, in advance of need  - Initiate/Maintain bed alarm  - Obtain necessary fall risk management equipment:   - Apply yellow socks and bracelet for high fall risk patients  - Consider moving patient to room near nurses station  Outcome: Progressing  Goal: Maintain or return to baseline ADL function  Description: INTERVENTIONS:  -  Assess patient's ability to carry out ADLs; assess patient's baseline for ADL function and identify physical deficits which impact ability to perform ADLs (bathing, care of mouth/teeth, toileting, grooming, dressing, etc )  - Assess/evaluate cause of self-care deficits   - Assess range of motion  - Assess patient's mobility; develop plan if impaired  - Assess patient's need for assistive devices and provide as appropriate  - Encourage maximum independence but intervene and supervise when necessary  - Involve family in performance of ADLs  - Assess for home care needs following discharge   - Consider OT consult to assist with ADL evaluation and planning for discharge  - Provide patient education as appropriate  Outcome: Progressing  Goal: Maintains/Returns to pre admission functional level  Description: INTERVENTIONS:  - Perform BMAT or MOVE assessment daily    - Set and communicate daily mobility goal to care team and patient/family/caregiver  - Collaborate with rehabilitation services on mobility goals if consulted  - Perform Range of Motion 3 times a day  - Reposition patient every 2 hours    - Dangle patient 3 times a day  - Stand patient 3 times a day  - Ambulate patient 3 times a day  - Out of bed to chair 3 times a day   - Out of bed for meals 3 times a day  - Out of bed for toileting  - Record patient progress and toleration of activity level   Outcome: Progressing     Problem: DISCHARGE PLANNING  Goal: Discharge to home or other facility with appropriate resources  Description: INTERVENTIONS:  - Identify barriers to discharge w/patient and caregiver  - Arrange for needed discharge resources and transportation as appropriate  - Identify discharge learning needs (meds, wound care, etc )  - Arrange for interpretive services to assist at discharge as needed  - Refer to Case Management Department for coordinating discharge planning if the patient needs post-hospital services based on physician/advanced practitioner order or complex needs related to functional status, cognitive ability, or social support system  Outcome: Progressing     Problem: Knowledge Deficit  Goal: Patient/family/caregiver demonstrates understanding of disease process, treatment plan, medications, and discharge instructions  Description: Complete learning assessment and assess knowledge base    Interventions:  - Provide teaching at level of understanding  - Provide teaching via preferred learning methods  Outcome: Progressing     Problem: MOBILITY - ADULT  Goal: Maintain or return to baseline ADL function  Description: INTERVENTIONS:  -  Assess patient's ability to carry out ADLs; assess patient's baseline for ADL function and identify physical deficits which impact ability to perform ADLs (bathing, care of mouth/teeth, toileting, grooming, dressing, etc )  - Assess/evaluate cause of self-care deficits   - Assess range of motion  - Assess patient's mobility; develop plan if impaired  - Assess patient's need for assistive devices and provide as appropriate  - Encourage maximum independence but intervene and supervise when necessary  - Involve family in performance of ADLs  - Assess for home care needs following discharge   - Consider OT consult to assist with ADL evaluation and planning for discharge  - Provide patient education as appropriate  Outcome: Progressing  Goal: Maintains/Returns to pre admission functional level  Description: INTERVENTIONS:  - Perform BMAT or MOVE assessment daily    - Set and communicate daily mobility goal to care team and patient/family/caregiver  - Collaborate with rehabilitation services on mobility goals if consulted  - Perform Range of Motion 3 times a day  - Reposition patient every 2 hours  - Dangle patient 3 times a day  - Stand patient 3 times a day  - Ambulate patient 3 times a day  - Out of bed to chair 3 times a day   - Out of bed for meals 3 times a day  - Out of bed for toileting  - Record patient progress and toleration of activity level   Outcome: Progressing     Problem: Nutrition/Hydration-ADULT  Goal: Nutrient/Hydration intake appropriate for improving, restoring or maintaining nutritional needs  Description: Monitor and assess patient's nutrition/hydration status for malnutrition  Collaborate with interdisciplinary team and initiate plan and interventions as ordered  Monitor patient's weight and dietary intake as ordered or per policy  Utilize nutrition screening tool and intervene as necessary  Determine patient's food preferences and provide high-protein, high-caloric foods as appropriate       INTERVENTIONS:  - Monitor oral intake, urinary output, labs, and treatment plans  - Assess nutrition and hydration status and recommend course of action  - Evaluate amount of meals eaten  - Assist patient with eating if necessary   - Allow adequate time for meals  - Recommend/ encourage appropriate diets, oral nutritional supplements, and vitamin/mineral supplements  - Order, calculate, and assess calorie counts as needed  - Recommend, monitor, and adjust tube feedings and TPN/PPN based on assessed needs  - Assess need for intravenous fluids  - Provide specific nutrition/hydration education as appropriate  - Include patient/family/caregiver in decisions related to nutrition  Outcome: Progressing

## 2023-05-10 NOTE — PLAN OF CARE
Problem: RESPIRATORY - ADULT  Goal: Achieves optimal ventilation and oxygenation  Description: INTERVENTIONS:  - Assess for changes in respiratory status  - Assess for changes in mentation and behavior  - Position to facilitate oxygenation and minimize respiratory effort  - Oxygen administered by appropriate delivery if ordered  - Initiate smoking cessation education as indicated  - Encourage broncho-pulmonary hygiene including cough, deep breathe, Incentive Spirometry  - Assess the need for suctioning and aspirate as needed  - Assess and instruct to report SOB or any respiratory difficulty  - Respiratory Therapy support as indicated  Outcome: Progressing     Problem: INFECTION - ADULT  Goal: Absence of fever/infection during neutropenic period  Description: INTERVENTIONS:  - Monitor WBC    Outcome: Progressing     Problem: SAFETY ADULT  Goal: Maintain or return to baseline ADL function  Description: INTERVENTIONS:  -  Assess patient's ability to carry out ADLs; assess patient's baseline for ADL function and identify physical deficits which impact ability to perform ADLs (bathing, care of mouth/teeth, toileting, grooming, dressing, etc )  - Assess/evaluate cause of self-care deficits   - Assess range of motion  - Assess patient's mobility; develop plan if impaired  - Assess patient's need for assistive devices and provide as appropriate  - Encourage maximum independence but intervene and supervise when necessary  - Involve family in performance of ADLs  - Assess for home care needs following discharge   - Consider OT consult to assist with ADL evaluation and planning for discharge  - Provide patient education as appropriate  Outcome: Progressing

## 2023-05-10 NOTE — PROGRESS NOTES
[facility] Practice Progress Note - Vera Whitaker 60 y o  male MRN: [id number]    Unit/Bed#: 2 South 202 Encounter: [id number]      Assessment/Plan:  * Leukocytosis  Assessment & Plan  Acute, improving    Pt w/ PMH leukemia has increasing leukocytes, despite antibiotic treatment with azithromycin and ceftriaxone for pna  WBC decreased slightly to 20 34     · Continue to trend WBC   · Heme/onc consulted; recommendations appreciated    Pneumonia  Assessment & Plan  Acute    Evidenced by cough and congestion, supported by increasing WBC with unremarkable CXR  CT chest/abd done on 5/8 unremarkable  Completed 3-day course of azithromycin 500 mg  This morning WBC was 20 34     · Trend WBC  · Continue Ceftriaxone 1g daily  · Robitussin Dm for Cough    · Mucinex BID for congestion   · Mucomyst BID   · Incentive spirometry- flutter   · Currently on 3L (home O2)    Hyponatremia  Assessment & Plan  Acute     Potassium decreased from normal range to 128 yesterday and continued to decrease to 123 after initiating fluids  Fluids were halted and pt given 40 mg lasix  This morning, Na is 128  Will give 40 mg lasix and continue monitoring strict I's and O's  · Lasix 40 mg once this morning   · Fluid restriction at 1800 mL  · Trend Na    Chronic respiratory failure (HCC)  Assessment & Plan  Chronic    In the setting of COPD, as evidenced by baseline dependence on O2 at 3L  Follows with pulmonology as outpt, chronically requires O2 @ 3L baseline  · Continue supplemental O2 with titration to maintain sat >90%  · Continue to monitor oxygenation status      Uncontrolled diabetes mellitus with hyperglycemia (HCC)  Assessment & Plan  Lab Results   Component Value Date    HGBA1C 11 6 (H) 01/19/2023     Pt with hx on uncontrolled DM 2  Last A1c 11 6 in 1/23  Home medications include metformin, liraglutide, lantus 70U BID, Aspart 35U TID        · F/u Repeat A1c   · Lantus 40 BID and titrate up as needed   · Increased Lispro to 15 U TID with meals, titrate up as needed   · Continue to monitor glucose    · Continue metformin 1000 mg BID    Hepatosplenomegaly  Assessment & Plan  Noted on CTAP  Associated with leukocytosis  DDx include infection vs hematological etiology  - trend WBC and fever curve    Ambulatory dysfunction  Assessment & Plan  Pt uses scooter to ambulate     · Fall precautions  · PT/OT     Generalized weakness  Assessment & Plan  Pt presents with generalized weakness for 1 week since he has been sick  · Fall precautions   · PT/OT   · Continue to monitor     Chronic obstructive pulmonary disease, unspecified ([facility] )  Assessment & Plan  Hx of COPD and follows with Pulmonology outpt  Home O2 of 3L   Pt currently on 3L on admission with O2 sat > 90%  · Continue Breo Ellipta and duonebs q4 scheduled  · Continue Pulmicort every 12 hours  · Continue Albuterol as needed for SOB   · Titrate O2 as needed     Chronic lymphocytic leukemia of B-cell type in remission ([facility] )  Assessment & Plan  Hx of CLL  Pt unable to relay history regarding time of diagnosis  · No treatment at the moment     Low back pain, unspecified  Assessment & Plan  Chronic, stable   Pt follows with pain management outpt and received medial branch block in 3/23  · Continue tizanidine as needed for muscle spasms   · Given 1 dose of 15 mg toradol on 5/7 and 5/8    Paroxysmal atrial fibrillation (HCC)  Assessment & Plan  Pt with hx of atrial fibrillation  Rate on admission 80  · Telemetry   · Continue metoprolol, eliquis and digoxin     Bipolar disorder (HCC)  Assessment & Plan  Stable     · Continue home medications : sertraline, quetiapine, buspar and lamictal     Hyperlipidemia  Assessment & Plan  Increased Lipitor from 20 mg to 40 mg  Continue 40 mg Lipitor daily       Obstructive sleep apnea  Assessment & Plan  Pt has hx of mild to moderate SHAVONNE with good compliance to his Bipap with settings ( 12/5, 3L)     · Continue bipap     Essential "hypertension  Assessment & Plan  BP on admission 159/84  · Continue aldactone, metoprolol and losartan daily  · Monitor VS     Esophageal reflux  Assessment & Plan  Stable     · Continue PPI     Coronary artery disease  Assessment & Plan  Hx of CAD  Unknown history of cardiac stents  Home medications ASA, lipitor and metoprolol       - continue home medications     Subjective:   Pt seen and examined at bedside  Pt states that he had some coughing and congestion over night  He is also requesting a nutrition counseling for his diabetes which is placed  Denies chest pain, SOB or any other complaints at this time  Objective:     Vitals: Blood pressure (!) 107/47, pulse 76, temperature 97 7 °F (36 5 °C), temperature source Oral, resp  rate 18, height 5' 11\" (1 803 m), weight 115 kg (254 lb 9 6 oz), SpO2 97 %  ,Body mass index is 35 51 kg/m²  Wt Readings from Last 3 Encounters:   05/09/23 115 kg (254 lb 9 6 oz)   04/19/23 113 kg (250 lb)   03/14/23 112 kg (248 lb)       Intake/Output Summary (Last 24 hours) at 5/10/2023 0840  Last data filed at 5/9/2023 2137  Gross per 24 hour   Intake --   Output 500 ml   Net -500 ml       Physical Exam  Constitutional:       Appearance: Normal appearance  Comments: Currently on 2 L O2   HENT:      Head: Normocephalic and atraumatic  Mouth/Throat:      Mouth: Mucous membranes are moist    Eyes:      General:         Right eye: No discharge  Left eye: No discharge  Conjunctiva/sclera: Conjunctivae normal    Cardiovascular:      Rate and Rhythm: Normal rate and regular rhythm  Pulses: Normal pulses  Heart sounds: Normal heart sounds  Pulmonary:      Effort: Pulmonary effort is normal  No respiratory distress  Breath sounds: Normal breath sounds  Abdominal:      General: Bowel sounds are normal       Palpations: Abdomen is soft  Tenderness: There is no abdominal tenderness  Musculoskeletal:      Cervical back: Neck supple        " Right lower leg: No edema  Left lower leg: No edema  Skin:     General: Skin is warm and dry  Capillary Refill: Capillary refill takes less than 2 seconds  Neurological:      Mental Status: He is alert             Recent Results (from the past 24 hour(s))   Fingerstick Glucose (POCT)    Collection Time: 05/09/23 10:59 AM   Result Value Ref Range    POC Glucose 130 65 - 140 mg/dl   Fingerstick Glucose (POCT)    Collection Time: 05/09/23  3:58 PM   Result Value Ref Range    POC Glucose 191 (H) 65 - 140 mg/dl   Basic metabolic panel    Collection Time: 05/09/23  6:01 PM   Result Value Ref Range    Sodium 127 (L) 135 - 147 mmol/L    Potassium 4 4 3 5 - 5 3 mmol/L    Chloride 91 (L) 96 - 108 mmol/L    CO2 25 21 - 32 mmol/L    ANION GAP 11 4 - 13 mmol/L    BUN 10 5 - 25 mg/dL    Creatinine 0 73 0 60 - 1 30 mg/dL    Glucose 231 (H) 65 - 140 mg/dL    Calcium 9 0 8 4 - 10 2 mg/dL    eGFR 98 ml/min/1 73sq m   Fingerstick Glucose (POCT)    Collection Time: 05/09/23  8:55 PM   Result Value Ref Range    POC Glucose 227 (H) 65 - 140 mg/dl   Fingerstick Glucose (POCT)    Collection Time: 05/09/23 11:45 PM   Result Value Ref Range    POC Glucose 164 (H) 65 - 140 mg/dl   CBC and differential    Collection Time: 05/10/23  5:35 AM   Result Value Ref Range    WBC 20 34 (H) 4 31 - 10 16 Thousand/uL    RBC 4 23 3 88 - 5 62 Million/uL    Hemoglobin 13 7 12 0 - 17 0 g/dL    Hematocrit 38 9 36 5 - 49 3 %    MCV 92 82 - 98 fL    MCH 32 4 26 8 - 34 3 pg    MCHC 35 2 31 4 - 37 4 g/dL    RDW 12 5 11 6 - 15 1 %    MPV 8 9 8 9 - 12 7 fL    Platelets 617 993 - 991 Thousands/uL   Basic metabolic panel    Collection Time: 05/10/23  5:35 AM   Result Value Ref Range    Sodium 129 (L) 135 - 147 mmol/L    Potassium 4 3 3 5 - 5 3 mmol/L    Chloride 95 (L) 96 - 108 mmol/L    CO2 23 21 - 32 mmol/L    ANION GAP 11 4 - 13 mmol/L    BUN 9 5 - 25 mg/dL    Creatinine 0 69 0 60 - 1 30 mg/dL    Glucose 161 (H) 65 - 140 mg/dL    Calcium 9 1 8 4 - 10 2 mg/dL    eGFR 101 ml/min/1 73sq m   Magnesium    Collection Time: 05/10/23  5:35 AM   Result Value Ref Range    Magnesium 1 8 (L) 1 9 - 2 7 mg/dL   Fingerstick Glucose (POCT)    Collection Time: 05/10/23  7:18 AM   Result Value Ref Range    POC Glucose 123 65 - 140 mg/dl   Fingerstick Glucose (POCT)    Collection Time: 05/10/23  8:30 AM   Result Value Ref Range    POC Glucose 176 (H) 65 - 140 mg/dl       Current Facility-Administered Medications   Medication Dose Route Frequency Provider Last Rate Last Admin   • acetaminophen (TYLENOL) tablet 650 mg  650 mg Oral Q6H PRN Janice Good MD   650 mg at 05/06/23 2040   • acetylcysteine (MUCOMYST) 200 mg/mL inhalation solution 600 mg  3 mL Nebulization BID Scarlet James MD   600 mg at 05/10/23 6440   • apixaban (ELIQUIS) tablet 5 mg  5 mg Oral BID Janice Good MD   5 mg at 05/10/23 0816   • ascorbic acid (VITAMIN C) tablet 1,000 mg  1,000 mg Oral Daily Janice Good MD   1,000 mg at 05/10/23 0817   • aspirin (ECOTRIN LOW STRENGTH) EC tablet 81 mg  81 mg Oral Daily Marcia Doshi MD   81 mg at 05/10/23 0817   • atorvastatin (LIPITOR) tablet 40 mg  40 mg Oral Daily With Addis Niño MD   40 mg at 05/09/23 1616   • benzonatate (TESSALON PERLES) capsule 100 mg  100 mg Oral TID Christiano Irby MD   100 mg at 05/10/23 0817   • bismuth subsalicylate (PEPTO BISMOL) oral suspension 524 mg  524 mg Oral 4x Daily Janice Good MD   524 mg at 05/10/23 0815   • budesonide (PULMICORT) inhalation solution 0 5 mg  0 5 mg Nebulization Q12H Rashid Piedra MD   0 5 mg at 05/10/23 0465   • busPIRone (BUSPAR) tablet 10 mg  10 mg Oral BID Janice Good MD   10 mg at 05/10/23 0817   • calcium carbonate (TUMS) chewable tablet 1,000 mg  1,000 mg Oral Daily PRN Janice Good MD       • cefTRIAXone (ROCEPHIN) IVPB (premix in dextrose) 1,000 mg 50 mL  1,000 mg Intravenous Q24H Janice Good  mL/hr at 05/09/23 2047 1,000 mg at 05/09/23 2047   • cholecalciferol (VITAMIN D3) tablet 1,000 Units  1,000 Units Oral Daily Janice Good MD   1,000 Units at 05/10/23 0817   • digoxin (LANOXIN) tablet 250 mcg  250 mcg Oral Daily Janice Good MD   250 mcg at 05/10/23 5344   • fish oil capsule 1,000 mg  1,000 mg Oral Daily Janice Good MD   1,000 mg at 05/10/23 0817   • fluticasone (FLONASE) 50 mcg/act nasal spray 1 spray  1 spray Each Nare Daily Scarlet James MD   1 spray at 05/09/23 0831   • Fluticasone Furoate-Vilanterol 100-25 mcg/actuation 1 puff  1 puff Inhalation Daily Janice Good MD   1 puff at 05/10/23 0819   • gabapentin (NEURONTIN) capsule 600 mg  600 mg Oral TID Janice Good MD   600 mg at 05/10/23 0817   • guaiFENesin (MUCINEX) 12 hr tablet 600 mg  600 mg Oral Q12H [address] Christiano Irby MD   600 mg at 05/10/23 0816   • insulin glargine (LANTUS) subcutaneous injection 40 Units 0 4 mL  40 Units Subcutaneous BID Rashid Piedra MD   40 Units at 05/10/23 0811   • insulin lispro (HumaLOG) 100 units/mL subcutaneous injection 15 Units  15 Units Subcutaneous TID With Meals Chelo Olsen MD       • ipratropium-albuterol (DUO-NEB) 0 5-2 5 mg/3 mL inhalation solution 3 mL  3 mL Nebulization Q4H Scarlet James MD   3 mL at 05/10/23 6139   • ketorolac (TORADOL) injection 15 mg  15 mg Intravenous Daily PRN Scarlet James MD       • lamoTRIgine (LaMICtal) tablet 25 mg  25 mg Oral HS Janice Good MD   25 mg at 05/09/23 2104   • losartan (COZAAR) tablet 50 mg  50 mg Oral Daily Janice Good MD   50 mg at 05/10/23 0816   • magnesium sulfate 2 g/50 mL IVPB (premix) 2 g  2 g Intravenous Once Scarlet James MD       • menthol-methyl salicylate (BENGAY) 47-56 % cream   Apply externally 4x Daily PRN Christiano Irby MD       • metFORMIN (GLUCOPHAGE) tablet 1,000 mg  1,000 mg Oral BID With Meals Christiano Irby MD   1,000 mg at 05/10/23 0816   • metoprolol succinate (TOPROL-XL) 24 hr tablet 200 mg  200 mg Oral Daily Niesha Quintana MD   200 mg at 05/10/23 0816   • multivitamin-minerals (CENTRUM) tablet 1 tablet  1 tablet Oral Daily Niesha Quintana MD   1 tablet at 05/10/23 0817   • ondansetron (ZOFRAN) injection 4 mg  4 mg Intravenous Q6H PRN Niesha Quintana MD       • pantoprazole (PROTONIX) EC tablet 20 mg  20 mg Oral Early Morning Niesha Quintana MD   20 mg at 05/10/23 7122   • QUEtiapine (SEROquel XR) 24 hr tablet 100 mg  100 mg Oral QAM Niesha Quintana MD   100 mg at 05/09/23 7205   • QUEtiapine (SEROquel) tablet 300 mg  300 mg Oral HS Niesha Quintana MD   300 mg at 05/09/23 2103   • sertraline (ZOLOFT) tablet 50 mg  50 mg Oral Daily Niesha Quintana MD   50 mg at 05/10/23 8509   • spironolactone (ALDACTONE) tablet 25 mg  25 mg Oral Daily Niesha Quintana MD   25 mg at 05/10/23 0817   • tiZANidine (ZANAFLEX) tablet 4 mg  4 mg Oral Q8H PRN Niesha Quintana MD   4 mg at 05/10/23 0423       Invasive Devices     Peripheral Intravenous Line  Duration           Peripheral IV 05/05/23 Left Arm 4 days    Peripheral IV 05/09/23 Right;Ventral (anterior) Forearm <1 day                Lab, Imaging and other studies: I have personally reviewed pertinent reports      VTE Pharmacologic Prophylaxis: Eliquis  VTE Mechanical Prophylaxis: sequential compression device    Lizeth Kinney MD

## 2023-05-10 NOTE — PHYSICAL THERAPY NOTE
"   PT TREATMENT     05/10/23 3715   Note Type   Note Type Treatment   Pain Assessment   Pain Assessment Tool Mon-Baker FACES   Mon-Baker FACES Pain Rating 4   Pain Location/Orientation Location: Back   Restrictions/Precautions   Other Precautions   (3L02)   General   Chart Reviewed Yes   Cognition   Overall Cognitive Status WFL   Subjective   Subjective \"I hope to go home today\"   Bed Mobility   Supine to Sit 7  Independent   Sit to Supine 7  Independent   Transfers   Sit to Stand 7  Independent   Stand to Sit 7  Independent   Stand pivot 7  Independent   Toilet transfer 5  Supervision   Ambulation/Elevation   Gait pattern   (slow, steady)   Gait Assistance 5  Supervision   Assistive Device   (IV pole, 3L02)   Distance 30 feet, 75 feet   Assessment   Prognosis Good   Problem List Decreased endurance;Decreased strength; Impaired balance;Decreased mobility;Pain;Obesity   Assessment Pt demonstrates improving endurance and activity tolerance  Pt had been walking to the bathroom in his room consistently  The patient's AM-[facility] Basic Mobility Inpatient Short Form Raw Score is 21  A Raw score of greater than 16 suggests the patient may benefit from discharge to home  Plan   Treatment/Interventions Functional transfer training;LE strengthening/ROM;ADL retraining; Endurance training;Gait training;Patient/family training;Equipment eval/education   Progress Progressing toward goals   PT Frequency 3-5x/wk   Recommendation   PT Discharge Recommendation Home with home health rehabilitation   Equipment Recommended   (pt has a scooter and a walker and home 02)   AM-[facility] Basic Mobility Inpatient   Turning in Flat Bed Without Bedrails 3   Lying on Back to Sitting on Edge of Flat Bed Without Bedrails 4   Moving Bed to Chair 4   Standing Up From Chair Using Arms 4   Walk in Room 4   Climb 3-5 Stairs With Railing 2   Basic Mobility Inpatient Raw Score 21   Basic Mobility Standardized Score 45 55   Highest Level Of Mobility " JH-HLM Goal 6: Walk 10 steps or more   JH-HLM Achieved 7: Walk 25 feet or more   End of Consult   Patient Position at End of Consult Supine   Licensure   [address] [place] [address] License Number  Reyna Mark PT [id number]
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
## 2023-05-10 NOTE — PROGRESS NOTES
-- Patient: Raina Preston  -- MRN: [id number]  -- Aidin Request ID: [id number]  -- Level of care reserved: [address] [place] [address]  -- Partner Reserved: [place] [phone]  -- Clinical needs requested:  -- Geography searched: [id number]  -- Start of Service:  -- Request sent: 10:36am EDT on 5/9/2023 by Rosalinda Stanley  -- Partner reserved: 3:20pm EDT on 5/10/2023 by Rosalinda Stanley  -- Choice list shared: 10:41am EDT on 5/10/2023 by Rosalinda Stanley

## 2023-05-10 NOTE — CASE MANAGEMENT
Case Management Discharge Planning Note    Patient name Preeti Cervantes  Location [address] [place]  [place] [address] MRN [id number]  : 1960 Date 5/10/2023       Current Admission Date: 2023  Current Admission Diagnosis:Leukocytosis   Patient Active Problem List    Diagnosis Date Noted   • Hepatosplenomegaly 2023   • Leukocytosis 2023   • Pneumonia 2023   • Generalized weakness 2023   • Ambulatory dysfunction 2023   • Immunodeficiency, unspecified ([facility] ) 2022   • Hypo-osmolality and hyponatremia 2022   • Low back pain, unspecified 2022   • Chronic lymphocytic leukemia of B-cell type in remission ([facility] ) 2022   • Chronic obstructive pulmonary disease, unspecified ([place] [address] ) 2022   • Chronic pain syndrome 2022   • Foraminal stenosis of lumbar region 2022   • Lumbar post-laminectomy syndrome 2022   • Lumbar radiculopathy 2022   • Shortness of breath 2022   • SIRS (systemic inflammatory response syndrome) ([facility] ) 2022   • Dysuria 2021   • Other intervertebral disc degeneration, lumbar region 2021   • Bipolar disorder ([facility] ) 2021   • Hypertensive heart and chronic kidney disease with heart failure and stage 1 through stage 4 chronic kidney disease, or unspecified chronic kidney disease ([facility] ) 2021   • Paroxysmal atrial fibrillation ([facility] ) 2021   • Chronic respiratory failure with hypoxia ([facility] ) 2021   • Chronic kidney disease, stage 2 (mild) 2021   • Atherosclerotic heart disease of native coronary artery without angina pectoris 2021   • Peripheral neuropathy 2021   • Muscle weakness (generalized) 2021   • Platelets decreased ([facility] ) 2021   • Acute on chronic diastolic congestive heart failure ([facility] ) 2020   • Hyperlipidemia 10/29/2020   • Nutritional anemia, unspecified  10/29/2020   • Chest pain 10/11/2020   • Simple chronic bronchitis ([facility] ) 10/11/2020   • Hyperglycemia 05/01/2020   • Transition of care performed with sharing of clinical summary 04/11/2020   • Obstructive sleep apnea 02/02/2020   • Hyponatremia 02/02/2020   • COPD (chronic obstructive pulmonary disease) ([place] [address] ) 02/02/2020   • Chronic a-fib ([place] [address] ) 02/02/2020   • H/O vitamin D deficiency 10/28/2019   • Type 2 diabetes mellitus with complication, with long-term current use of insulin ([place] [address] ) 06/21/2019   • Restrictive ventilatory defect 03/26/2019   • 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 [facility]) 03/25/2019   • Lung disease, restrictive 11/21/2018   • Chronic respiratory failure ([place] [address] ) 10/31/2018   • Coronary artery disease 09/06/2017   • Esophageal reflux 09/06/2017   • ANA LILIA (acute kidney injury) ([place] [address] ) 03/20/2017   • Chronic low back pain 11/30/2016   • SHAVONNE and COPD overlap syndrome     • Morbid obesity     • Uncontrolled diabetes mellitus with hyperglycemia ([place] [address] ) 09/02/2016   • Fatigue 09/02/2016   • GERD 06/08/2016   • Cough 01/13/2016   • Psychiatric disorder    • 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/25/2015   • Erectile dysfunction of organic origin 08/25/2014   • Essential hypertension 09/04/2012   • Diabetic neuropathy ([place] [address] ) 09/04/2012   • Panic disorder without agoraphobia 09/04/2012   • Vitamin D deficiency 09/04/2012      LOS (days): 5  Geometric Mean LOS (GMLOS) (days): 2 90  Days to GMLOS:-2     OBJECTIVE:  Risk of Unplanned Readmission Score: 31 22     Current admission status: Inpatient   Preferred Pharmacy:   [address] [facility] [address]  [address] [place] [address]  Phone: [phone] Fax: [phone]    Primary Care Provider: MANAS Payne    Primary Insurance: [facility]  Secondary Insurance:     DISCHARGE DETAILS:    Discharge planning discussed with[de-identified] Patient  Freedom of Choice: Yes  Comments - Freedom of Choice: SW following to assist with DCP  SW met with pt to review plan and IMM    Pt has been accepted by [facility] for home care services  Discussed acceptance with pt and pt requested services from agency  Agency will be reserved in [address] [place]  Only other discharge need expressed is wheelchair [facility] transport home which SW will arranged when ready  Requested [address]         Is the patient interested in [facility] at discharge?: Yes  [address] Name[de-identified] [facility]    Other Referral/Resources/Interventions Provided:  Interventions: [facility]  Referral Comments: [facility]  Treatment Team Recommendation: Home with [address] [employer]  Discharge Destination Plan[de-identified] Home with [place] at Discharge : Donell Given (Date):: 05/10/23  IMM Given to[de-identified] Patient (IMM reviewed with pt  Pt verbalized understanding and agrees with discharge determination  Pt signed IMM and copy given    Copy also placed in scan bin for chart )

## 2023-05-10 NOTE — OCCUPATIONAL THERAPY NOTE
"OT EVALUATION       05/10/23 1023   Note Type   Note type Evaluation   Pain Assessment   Pain Assessment Tool 0-10   Pain Score 9   Pain Location/Orientation Location: Back   Restrictions/Precautions   Other Precautions Fall Risk;O2;Pain   Home Living   Type of Home Apartment   Home Layout One level;Elevator   Bathroom Shower/Tub Walk-in shower   Bathroom Toilet Raised   Bathroom Equipment Shower chair   Home Equipment Cane;Walker;Electric scooter   Prior Function   Level of [place] Independent with ADLs; Independent with functional mobility; Needs assistance with IADLS  (pt reports using his scooter for long distances)   Lives With Alone   Receives Help From Home health  (5x/week for 3-4 hours)   Subjective   Subjective \"I want to sit in the chair\"   ADL   Eating Assistance 7  [place] 5  Supervision/Setup   LB Bathing Assistance 4  [address] 5  Supervision/Setup   LB Dressing Assistance 4  [address] [facility]  5  Supervision/Setup   Bed Mobility   Supine to Sit 7  Independent   Transfers   Sit to [address] to Sit 5  Supervision   Functional Mobility   Functional Mobility 5  Supervision   Additional Comments 10 feet to chair from bed, few steps to place cushion under buttocks     Additional items Rolling walker   Balance   Static Sitting Good   Dynamic Sitting Fair +   Static Standing Fair +   Dynamic Standing Fair   Activity Tolerance   Activity Tolerance Patient limited by fatigue;Patient limited by pain   Nurse Made Aware yes   RUE Assessment   RUE Assessment   (shoulder flexion limited by pt report of back pain approximately 30 degrees, elbow/ WFL AROM)   LUE Assessment   LUE Assessment   (shoulder flexion limited by pt report of back pain approximately 30 degrees, elbow/ WFL AROM)   Cognition   Overall Cognitive Status WFL   Arousal/Participation Cooperative " Attention Within functional limits   Orientation Level Oriented X4   Following Commands Follows all commands and directions without difficulty   Assessment   Limitation Decreased ADL status; Decreased UE strength;Decreased Safe judgement during ADL;Decreased endurance;Decreased self-care trans;Decreased high-level ADLs  (decreased balance and mobility)   Prognosis Good   Assessment Patient evaluated by Occupational Therapy  Patient admitted with Leukocytosis  The patients occupational profile, medical and therapy history includes a extensive additional review of physical, cognitive, or psychosocial history related to current functional performance  Comorbidities affecting functional mobility and ADLS include: anxiety, Bipolar disorder, CAD, COPD and diabetes  Prior to admission, patient was independent with functional mobility with walker, independent with ADLS and requiring assist for IADLS  The evaluation identifies the following performance deficits: weakness, impaired balance, decreased endurance, increased fall risk, new onset of impairment of functional mobility, decreased ADLS, decreased IADLS, pain, decreased activity tolerance, decreased safety awareness, impaired judgement and decreased strength, that result in activity limitations and/or participation restrictions  This evaluation requires clinical decision making of high complexity, because the patient presents with comorbidites that affect occupational performance and required significant modification of tasks or assistance with consideration of multiple treatment options  The Barthel Index was used as a functional outcome tool presenting with a score of Barthel Index Score: 55, indicating marked limitations of functional mobility and ADLS  The patient's raw score on the -PAC Daily Activity Inpatient Short Form is 21  A raw score of greater than or equal to 19 suggests the patient may benefit from discharge to home   Please refer to the recommendation of the Occupational Therapist for safe discharge planning  Patient will benefit from skilled Occupational Therapy services to address above deficits and facilitate a safe return to prior level of function  Goals   Patient Goals less pain   STG Time Frame   (1-7 days)   Short Term Goal  Goals established to promote Patient Goals: less pain:  Grooming: independent standing at sink; Bathing: supervision; Upper Body Dressing independent; Lower Body Dressing: supervision; Toileting: independent; Patient will increase ambulatory standard toilet transfer to independent with rolling walker to increase performance and safety with ADLS and functional mobility; Patient will increase standing tolerance to 5 minutes during ADL task to decrease assistance level and decrease fall risk;  Patient will increase functional mobility to and from bathroom with rolling walker independently to increase performance with ADLS and to use a toilet; Patient will tolerate 5 minutes of UE ROM/strengthening to increase general activity tolerance and performance in ADLS/IADLS; Patient will improve functional activity tolerance to 10 minutes of sustained functional tasks to increase participation in basic self-care and decrease assistance level;  Patient will be able to to verbalize understanding and perform energy conservation/proper body mechanics during ADLS and functional mobility at least 75% of the time with minimal cueing to decrease signs of fatigue and increase stamina to return to prior level of function; Patient will increase dynamic standing balance to fair+ to improve postural stability and decrease fall risk during standing ADLS and transfers  LTG Time Frame   (8-14 days)   Long Term Goal Bathing: independent; Upper Body Dressing independent;  Lower Body Dressing: independent;  Patient will increase standing tolerance to 10 minutes during ADL task to decrease assistance level and decrease fall risk;  Patient will tolerate 10 minutes of UE ROM/strengthening to increase general activity tolerance and performance in ADLS/IADLS; Patient will improve functional activity tolerance to 20 minutes of sustained functional tasks to increase participation in basic self-care and decrease assistance level;  Patient will be able to to verbalize understanding and perform energy conservation/proper body mechanics during ADLS and functional mobility at least 90% of the time without cueing to decrease signs of fatigue and increase stamina to return to prior level of function; Patient will increase dynamic standing balance to good to improve postural stability and decrease fall risk during standing ADLS and transfers  Pt will score >/= 24/24 on AM-PAC Daily Activity Inpatient scale to promote safe independence with ADLs and functional mobility; Pt will score >/= 85/100 on Barthel Index in order to decrease caregiver assistance needed and increase ability to perform ADLs and functional mobility  Plan   Treatment Interventions ADL retraining;Functional transfer training;UE strengthening/ROM; Endurance training;Patient/family training;Equipment evaluation/education; Activityengagement; Energy conservation; Compensatory technique education   Goal Expiration Date 05/24/23   OT Frequency 3-5x/wk   Recommendation   OT Discharge Recommendation Home with home health rehabilitation   AM-[facility] Daily Activity Inpatient   Lower Body Dressing 3   Bathing 3   Toileting 3   Upper Body Dressing 4   Grooming 4   Eating 4   Daily Activity Raw Score 21   Daily Activity Standardized Score (Calc for Raw Score >=11) 44 27   AM-[facility] Applied Cognition Inpatient   Following a Speech/Presentation 4   Understanding Ordinary Conversation 4   Taking Medications 4   Remembering Where Things Are Placed or Put Away 4   Remembering List of 4-5 Errands 4   Taking Care of Complicated Tasks 4   Applied Cognition Raw Score 24   Applied Cognition Standardized Score 62 21   Barthel Index Feeding 10   Bathing 0   Grooming Score 0   Dressing Score 5   Bladder Score 10   Bowels Score 10   Toilet Use Score 5   Transfers (Bed/Chair) Score 15   Mobility (Level Surface) Score 0   Stairs Score 0   Barthel Index Score 54   Licensure   [place] License Number  [facility] MS OTR/L [id number]

## 2023-05-11 ENCOUNTER — TELEPHONE (OUTPATIENT)
Dept: FAMILY MEDICINE CLINIC | Facility: CLINIC | Age: 63
End: 2023-05-11

## 2023-05-11 ENCOUNTER — PATIENT OUTREACH (OUTPATIENT)
Dept: FAMILY MEDICINE CLINIC | Facility: CLINIC | Age: 63
End: 2023-05-11

## 2023-05-11 VITALS
RESPIRATION RATE: 18 BRPM | TEMPERATURE: 98 F | HEART RATE: 86 BPM | WEIGHT: 155.42 LBS | SYSTOLIC BLOOD PRESSURE: 133 MMHG | BODY MASS INDEX: 21.76 KG/M2 | OXYGEN SATURATION: 97 % | HEIGHT: 71 IN | DIASTOLIC BLOOD PRESSURE: 86 MMHG

## 2023-05-11 DIAGNOSIS — Z59.9 FINANCIAL DIFFICULTIES: Primary | ICD-10-CM

## 2023-05-11 PROBLEM — R53.1 GENERALIZED WEAKNESS: Status: RESOLVED | Noted: 2023-05-05 | Resolved: 2023-05-11

## 2023-05-11 PROBLEM — T78.40XA ALLERGIES: Status: ACTIVE | Noted: 2023-05-11

## 2023-05-11 PROBLEM — E83.42 HYPOMAGNESEMIA: Status: ACTIVE | Noted: 2023-05-11

## 2023-05-11 LAB
ANION GAP SERPL CALCULATED.3IONS-SCNC: 9 MMOL/L (ref 4–13)
BACTERIA BLD CULT: NORMAL
BUN SERPL-MCNC: 11 MG/DL (ref 5–25)
CALCIUM SERPL-MCNC: 9.4 MG/DL (ref 8.4–10.2)
CHLORIDE SERPL-SCNC: 95 MMOL/L (ref 96–108)
CO2 SERPL-SCNC: 27 MMOL/L (ref 21–32)
CREAT SERPL-MCNC: 0.7 MG/DL (ref 0.6–1.3)
ERYTHROCYTE [DISTWIDTH] IN BLOOD BY AUTOMATED COUNT: 12.7 % (ref 11.6–15.1)
GFR SERPL CREATININE-BSD FRML MDRD: 100 ML/MIN/1.73SQ M
GLUCOSE SERPL-MCNC: 102 MG/DL (ref 65–140)
GLUCOSE SERPL-MCNC: 124 MG/DL (ref 65–140)
GLUCOSE SERPL-MCNC: 128 MG/DL (ref 65–140)
GLUCOSE SERPL-MCNC: 165 MG/DL (ref 65–140)
HCT VFR BLD AUTO: 40.6 % (ref 36.5–49.3)
HGB BLD-MCNC: 14.4 G/DL (ref 12–17)
MAGNESIUM SERPL-MCNC: 1.7 MG/DL (ref 1.9–2.7)
MCH RBC QN AUTO: 32.7 PG (ref 26.8–34.3)
MCHC RBC AUTO-ENTMCNC: 35.5 G/DL (ref 31.4–37.4)
MCV RBC AUTO: 92 FL (ref 82–98)
PLATELET # BLD AUTO: 158 THOUSANDS/UL (ref 149–390)
PMV BLD AUTO: 8.8 FL (ref 8.9–12.7)
POTASSIUM SERPL-SCNC: 4.2 MMOL/L (ref 3.5–5.3)
RBC # BLD AUTO: 4.4 MILLION/UL (ref 3.88–5.62)
SODIUM SERPL-SCNC: 131 MMOL/L (ref 135–147)
WBC # BLD AUTO: 24.74 THOUSAND/UL (ref 4.31–10.16)

## 2023-05-11 RX ORDER — MAGNESIUM L-LACTATE 84 MG
84 TABLET, EXTENDED RELEASE ORAL DAILY
Qty: 30 TABLET | Refills: 0 | Status: SHIPPED | OUTPATIENT
Start: 2023-05-11 | End: 2023-06-10

## 2023-05-11 RX ORDER — INSULIN GLARGINE 100 [IU]/ML
45 INJECTION, SOLUTION SUBCUTANEOUS 2 TIMES DAILY
Qty: 30 ML | Refills: 0 | Status: SHIPPED | OUTPATIENT
Start: 2023-05-11 | End: 2023-05-11

## 2023-05-11 RX ORDER — BENZONATATE 100 MG/1
100 CAPSULE ORAL 3 TIMES DAILY
Qty: 20 CAPSULE | Refills: 0 | Status: SHIPPED | OUTPATIENT
Start: 2023-05-11

## 2023-05-11 RX ORDER — INSULIN LISPRO 100 [IU]/ML
20 INJECTION, SOLUTION INTRAVENOUS; SUBCUTANEOUS
Qty: 15 ML | Refills: 0 | Status: SHIPPED | OUTPATIENT
Start: 2023-05-11

## 2023-05-11 RX ORDER — MAGNESIUM SULFATE HEPTAHYDRATE 40 MG/ML
4 INJECTION, SOLUTION INTRAVENOUS ONCE
Status: COMPLETED | OUTPATIENT
Start: 2023-05-11 | End: 2023-05-11

## 2023-05-11 RX ORDER — INSULIN ASPART 100 [IU]/ML
20 INJECTION, SOLUTION INTRAVENOUS; SUBCUTANEOUS
Qty: 15 ML | Refills: 0 | Status: SHIPPED | OUTPATIENT
Start: 2023-05-11 | End: 2023-05-11

## 2023-05-11 RX ORDER — ATORVASTATIN CALCIUM 40 MG/1
40 TABLET, FILM COATED ORAL
Qty: 30 TABLET | Refills: 2 | Status: SHIPPED | OUTPATIENT
Start: 2023-05-11 | End: 2023-06-10

## 2023-05-11 RX ORDER — FLUTICASONE PROPIONATE 50 MCG
1 SPRAY, SUSPENSION (ML) NASAL DAILY
Qty: 11.1 ML | Refills: 2 | Status: SHIPPED | OUTPATIENT
Start: 2023-05-12

## 2023-05-11 RX ORDER — INSULIN LISPRO 100 [IU]/ML
20 INJECTION, SOLUTION INTRAVENOUS; SUBCUTANEOUS
Qty: 21 ML | Refills: 0 | Status: SHIPPED | OUTPATIENT
Start: 2023-05-11 | End: 2023-05-11

## 2023-05-11 RX ORDER — INSULIN GLARGINE 100 [IU]/ML
45 INJECTION, SOLUTION SUBCUTANEOUS 2 TIMES DAILY
Qty: 50 ML | Refills: 0 | Status: SHIPPED | OUTPATIENT
Start: 2023-05-11 | End: 2023-06-10

## 2023-05-11 RX ADMIN — GUAIFENESIN 600 MG: 600 TABLET, EXTENDED RELEASE ORAL at 08:29

## 2023-05-11 RX ADMIN — BENZONATATE 100 MG: 100 CAPSULE ORAL at 08:28

## 2023-05-11 RX ADMIN — CHOLECALCIFEROL TAB 25 MCG (1000 UNIT) 1000 UNITS: 25 TAB at 08:33

## 2023-05-11 RX ADMIN — FLUTICASONE FUROATE AND VILANTEROL TRIFENATATE 1 PUFF: 100; 25 POWDER RESPIRATORY (INHALATION) at 08:31

## 2023-05-11 RX ADMIN — INSULIN LISPRO 15 UNITS: 100 INJECTION, SOLUTION INTRAVENOUS; SUBCUTANEOUS at 07:51

## 2023-05-11 RX ADMIN — FLUTICASONE PROPIONATE 1 SPRAY: 50 SPRAY, METERED NASAL at 08:31

## 2023-05-11 RX ADMIN — IPRATROPIUM BROMIDE AND ALBUTEROL SULFATE 3 ML: 2.5; .5 SOLUTION RESPIRATORY (INHALATION) at 16:38

## 2023-05-11 RX ADMIN — Medication 1 TABLET: at 08:28

## 2023-05-11 RX ADMIN — IPRATROPIUM BROMIDE AND ALBUTEROL SULFATE 3 ML: 2.5; .5 SOLUTION RESPIRATORY (INHALATION) at 04:45

## 2023-05-11 RX ADMIN — DIGOXIN 250 MCG: 0.12 TABLET ORAL at 08:28

## 2023-05-11 RX ADMIN — ACETYLCYSTEINE 600 MG: 200 SOLUTION ORAL; RESPIRATORY (INHALATION) at 07:19

## 2023-05-11 RX ADMIN — OMEGA-3 FATTY ACIDS CAP 1000 MG 1000 MG: 1000 CAP at 08:29

## 2023-05-11 RX ADMIN — ASPIRIN 81 MG: 81 TABLET, COATED ORAL at 08:30

## 2023-05-11 RX ADMIN — GABAPENTIN 600 MG: 300 CAPSULE ORAL at 08:28

## 2023-05-11 RX ADMIN — IPRATROPIUM BROMIDE AND ALBUTEROL SULFATE 3 ML: 2.5; .5 SOLUTION RESPIRATORY (INHALATION) at 11:32

## 2023-05-11 RX ADMIN — BISMUTH SUBSALICYLATE 524 MG: 525 LIQUID ORAL at 12:01

## 2023-05-11 RX ADMIN — APIXABAN 5 MG: 5 TABLET, FILM COATED ORAL at 08:29

## 2023-05-11 RX ADMIN — MAGNESIUM SULFATE HEPTAHYDRATE 4 G: 40 INJECTION, SOLUTION INTRAVENOUS at 09:04

## 2023-05-11 RX ADMIN — SPIRONOLACTONE 25 MG: 25 TABLET ORAL at 08:29

## 2023-05-11 RX ADMIN — METFORMIN HYDROCHLORIDE 1000 MG: 500 TABLET ORAL at 07:51

## 2023-05-11 RX ADMIN — TIZANIDINE 4 MG: 2 TABLET ORAL at 13:38

## 2023-05-11 RX ADMIN — KETOROLAC TROMETHAMINE 15 MG: 30 INJECTION, SOLUTION INTRAMUSCULAR; INTRAVENOUS at 09:04

## 2023-05-11 RX ADMIN — IPRATROPIUM BROMIDE AND ALBUTEROL SULFATE 3 ML: 2.5; .5 SOLUTION RESPIRATORY (INHALATION) at 07:19

## 2023-05-11 RX ADMIN — BUSPIRONE HYDROCHLORIDE 10 MG: 10 TABLET ORAL at 08:30

## 2023-05-11 RX ADMIN — BISMUTH SUBSALICYLATE 524 MG: 525 LIQUID ORAL at 08:28

## 2023-05-11 RX ADMIN — METOPROLOL SUCCINATE 200 MG: 100 TABLET, EXTENDED RELEASE ORAL at 08:29

## 2023-05-11 RX ADMIN — LOSARTAN POTASSIUM 50 MG: 50 TABLET, FILM COATED ORAL at 08:29

## 2023-05-11 RX ADMIN — IPRATROPIUM BROMIDE AND ALBUTEROL SULFATE 3 ML: 2.5; .5 SOLUTION RESPIRATORY (INHALATION) at 01:14

## 2023-05-11 RX ADMIN — BUDESONIDE 0.5 MG: 0.5 INHALANT ORAL at 07:19

## 2023-05-11 RX ADMIN — INSULIN GLARGINE 40 UNITS: 100 INJECTION, SOLUTION SUBCUTANEOUS at 08:28

## 2023-05-11 RX ADMIN — PANTOPRAZOLE SODIUM 20 MG: 20 TABLET, DELAYED RELEASE ORAL at 06:47

## 2023-05-11 RX ADMIN — QUETIAPINE FUMARATE 100 MG: 50 TABLET, EXTENDED RELEASE ORAL at 08:31

## 2023-05-11 RX ADMIN — OXYCODONE HYDROCHLORIDE AND ACETAMINOPHEN 1000 MG: 500 TABLET ORAL at 08:29

## 2023-05-11 RX ADMIN — SERTRALINE HYDROCHLORIDE 50 MG: 50 TABLET ORAL at 08:29

## 2023-05-11 SDOH — ECONOMIC STABILITY - INCOME SECURITY: PROBLEM RELATED TO HOUSING AND ECONOMIC CIRCUMSTANCES, UNSPECIFIED: Z59.9

## 2023-05-11 NOTE — DISCHARGE INSTR - AVS FIRST PAGE
Follow-up with hematology/oncology as soon as possible  Start taking magnesium supplements at home  Adjusted diabetes regimen to glargine 45 units twice daily and lispro 20 units 3 times daily with meals, along with metformin at 1000 mg twice daily  Please do lab work (CBC, BMP and magnesium) in 1 week  Follow-up with PCP at [facility] after blood work is drawn after 1 week  Someone will call you from the office

## 2023-05-11 NOTE — DISCHARGE SUMMARY
Discharge Summary - [address] Medicine Residency     Patient Information: Sreedhar Dudley 60 y o  male MRN: [id number]  Unit/Bed#: [address] [place] [address] Encounter: [id number]     Admitting Physician: Kaylee Modi DO  Discharging Physician/Practitioner: Blossom Denny MD   PCP: MANAS Moreno  Admission Date: 5/2/23  Admission Orders (From admission, onward)     Ordered        05/05/23 1805  INPATIENT ADMISSION  Once                      Discharge Date: 05/11/23      Reason for Admission: Pneumonia [J18 9]  COPD exacerbation ([facility] ) [J44 1]  Sepsis without acute organ dysfunction, due to unspecified organism [facility]) [A41 9]     Discharge Diagnoses:      Principal Problem:    Leukocytosis  Active Problems:    Chronic respiratory failure (HCC)    Hyponatremia    Pneumonia    Uncontrolled diabetes mellitus with hyperglycemia (HCC)    Coronary artery disease    Esophageal reflux    Essential hypertension    Obstructive sleep apnea    Hyperlipidemia    Bipolar disorder (HCC)    Paroxysmal atrial fibrillation (HCC)    Low back pain, unspecified    Chronic lymphocytic leukemia of B-cell type in remission ([facility] )    Chronic obstructive pulmonary disease, unspecified ([facility] )    Ambulatory dysfunction    Hepatosplenomegaly    Allergies    Hypomagnesemia  Resolved Problems:    Generalized weakness       Consultations During Hospital Stay:  ·       Hematology oncology     Procedures Performed:   ·       None     Significant Findings / Test Results:   5/11: WBC 24 74, Na 131 , Mag 1 7  5/10: WBC 20 34, Na 129 (ashwin 130), Mg 1 8  5/9: WBC 20 71, Na 128 (ashwin 129), Mg 1 8  5/8: WBC 19 02, Na 128>123>128, K 3 6, Mg 1 6  5/7: WBC 17 57, Hg 14 1, Na 136, K 4 0, Cr 0 65  5/6: WBC 17 05  LA 1 7 Na 136, strep pna neg  Legionella neg   5/5: WBC 16 78, LA 2 2>>2 6, Procalcitonin  05, Na 130    CXR: No acute cardiopulmonary disease    CT PE (5/8)- NAD, No PE  Hepatosplenomegaly  Prostatomegaly  Urinary bladder wall thickening  Incidental Findings:   ·       None     Test Results Pending at Discharge (will require follow up): ·       None     Outpatient Tests Requested:  ·       CBC, BMP and magnesium     Outpatient follow-up Requested:  ·       Hematology oncology, PCP at [facility]    Complications:  none    Things to address at first visit after hospitalization   How is your coughing? Have you noticed any fevers? Did you follow-up with hematology oncology? Are you checking your glucose levels regularly? Following a diabetic diet? Did you complete your lab work? (CBC- luekocytosis, bmp-hyponatremia, magensium-hypomag)    Hospital Course:      Margarito Piedra is a 60 y o  male who presents to the ED with a cough for 1 week which has been worsening  He notes that he had a home visit on 5/3/2023 and since then he has had chills, diaphoresis, fatigue, nasal congestion, sore throat nonproductive cough and weakness  At home patient is noted to be on 3 L for COPD; in the ED he is oxygenating at 95 to 97% on the 3 L nasal cannula  He was evaluated by nurse practitioner, and was advised to contact the office if his symptoms worsen  He is noted to have exertional dyspnea, leukocytosis, elevated lactic acid, and an EKG showing rate controlled atrial fibrillation  He was given Zosyn and is being admitted to the family medicine service      Past medical history is significant for COPD, atrial fibrillation on Eliquis, chronic pain, CHF, T2DM, bipolar disorder, chronic lymphocytic leukemia of B-cell, and ambulatory dysfunction for which she requires a scooter at home  3    * Leukocytosis  Leukocytosis initially improving then increased today  Patient has history of leukemia  Hematology oncology cleared patient for discharge today, with close follow-up outpatient  CBC in 1 week  Pneumonia  Initially evidenced by cough and congestion, supported by increasing WBC with unremarkable CXR  CT chest/abd done on 5/8 unremarkable    Completed 3-day course of azithromycin 500 mg and 5-day course of ceftriaxone  Discharged with Hugo Ortiz as needed for lingering cough  Hyponatremia  Hyponatremia improved with fluid restriction  Repeat BMP in 1 week  Low magnesium  Replenished during admission  Repeat magnesium in 1 week  Chronic respiratory failure ([facility] )  Upon discharge, continue baseline home O2 of 3 L  Uncontrolled diabetes mellitus with hyperglycemia ([facility] )  Pt with hx on uncontrolled DM 2  Last A1c 11 6 in 1/23  Home medications include metformin, liraglutide, lantus 70U BID, Aspart 35U TID   During admission, patient had significantly decreased dose of insulin and glucose remained stable  Upon discharge continue Lantus 45 units twice daily and lispro 20 units 3 times daily with meals  Continue metformin at 1000 mg twice daily and to monitor glucose ACHS  Hepatosplenomegaly  Noted on CTAP  Associated with leukocytosis  Ambulatory dysfunction  Upon discharge continue home with home health rehab  Chronic obstructive pulmonary disease, unspecified (HCC)  Hx of COPD and follows with Pulmonology outpt  Home O2 of 3L   Pt currently on 3L on admission with O2 sat > 90%  Continue Breo Ellipta and albuterol inhaler as needed upon discharge  Chronic lymphocytic leukemia of B-cell type in remission [facility])  Assessment & Plan  Hx of CLL  Pt unable to relay history regarding time of diagnosis  Patient had increased WBC during admission  Upon discharge, follow-up closely with hematology oncology  Low back pain, unspecified  Pt follows with pain management outpt and received medial branch block in 3/23  Upon discharge, follow-up with pain management  Paroxysmal atrial fibrillation (HCC)  Pt with hx of atrial fibrillation  Rate on 80       · Telemetry   · Continue metoprolol, eliquis and digoxin     Bipolar disorder (HCC)  Assessment & Plan  Stable     · Continue home medications : sertraline, quetiapine, buspar and "lamictal     Hyperlipidemia  Assessment & Plan  Increased Lipitor from 20 mg to 40 mg  Continue 40 mg Lipitor daily  Obstructive sleep apnea  Assessment & Plan  Pt has hx of mild to moderate SHAVONNE with good compliance to his Bipap with settings ( 12/5, 3L)     · Continue bipap     Essential hypertension  Assessment & Plan  BP on admission 159/84  Upon discharge, ontinue aldactone, metoprolol and losartan daily  Esophageal reflux  Upon discharge, continue PPI  Coronary artery disease  Assessment & Plan  Hx of CAD  Unknown history of cardiac stents  Home medications ASA, lipitor and metoprolol  Upon discharge, continue home medications  Generalized weakness-resolved as of 5/11/2023  Assessment & Plan  Pt presents with generalized weakness for 1 week since he has been sick  · Fall precautions   · PT/OT   · Continue to monitor        Condition at Discharge: stable      Discharge Day Visit / Exam:      Vitals: Blood Pressure: 133/86 (05/11/23 0801)  Pulse: 86 (05/11/23 0801)  Temperature: 98 °F (36 7 °C) (05/10/23 2245)  Temp Source: Oral (05/10/23 2245)  Respirations: 18 (05/11/23 0801)  Height: 5' 11\" (180 3 cm) (05/06/23 0616)  Weight - Scale: 70 5 kg (155 lb 6 8 oz) (05/10/23 0600)  SpO2: 94 % (05/11/23 1142)  Exam:   Physical Exam  Constitutional:       Appearance: He is well-developed  He is obese  Comments: On 3 L NC    HENT:      Head: Normocephalic and atraumatic  Eyes:      General:         Right eye: No discharge  Left eye: No discharge  Conjunctiva/sclera: Conjunctivae normal    Cardiovascular:      Rate and Rhythm: Normal rate and regular rhythm  Pulses: Normal pulses  Heart sounds: Normal heart sounds  Pulmonary:      Effort: Pulmonary effort is normal  No respiratory distress  Breath sounds: Normal breath sounds  Abdominal:      General: Bowel sounds are normal       Palpations: Abdomen is soft  Tenderness: There is no abdominal tenderness   " Musculoskeletal:         General: Normal range of motion  Cervical back: Normal range of motion and neck supple  Skin:     General: Skin is warm and dry  Capillary Refill: Capillary refill takes less than 2 seconds  Neurological:      Mental Status: He is alert  Discharge instructions/Information to patient and family:   See after visit summary for information provided to patient and family  Discharge Medications:     Medication List      START taking these medications    • benzonatate 100 mg capsule; Commonly known as: TESSALON PERLES; Take 1   capsule (100 mg total) by mouth 3 (three) times a day  • fluticasone 50 mcg/act nasal spray; Commonly known as: FLONASE; 1 spray   into each nostril daily Do not start before May 12, 2023 ; Start taking   on: May 12, 2023  • HumaLOG KwikPen 100 units/mL injection pen; Generic drug: insulin   lispro; Inject 20 Units under the skin 3 (three) times a day with meals  • insulin glargine 100 units/mL subcutaneous injection; Commonly known as:   LANTUS; Inject 45 Units under the skin 2 (two) times a day; Replaces: Insulin Glargine Solostar 100 UNIT/ML Sopn  • magnesium 84 MG (7MEQ) Tbcr; Commonly known as: Vaishali Naranjo; Take 1 tablet   (84 mg total) by mouth daily  • metFORMIN 1000 MG tablet; Commonly known as: GLUCOPHAGE; Take 1 tablet   (1,000 mg total) by mouth 2 (two) times a day with meals; Replaces:   metFORMIN 500 mg 24 hr tablet     CHANGE how you take these medications    • atorvastatin 40 mg tablet; Commonly known as: LIPITOR; Take 1 tablet (40   mg total) by mouth daily with dinner; What changed: medication strength,   how much to take, when to take this  • QUEtiapine 300 mg tablet; Commonly known as: SEROquel; Take 1 tablet   (300 mg total) by mouth daily at bedtime; What changed: Another medication   with the same name was removed  Continue taking this medication, and   follow the directions you see here       CONTINUE taking these medications • acetaminophen 325 mg tablet; Commonly known as: TYLENOL; Take 2 tablets   (650 mg total) by mouth every 6 (six) hours as needed for mild pain,   headaches or fever  • Adjustable Lancing Device Misc; USE AS DIRECTED  • albuterol (2 5 mg/3 mL) 0 083 % nebulizer solution; Take 1 vial (2 5 mg   total) by nebulization every 6 (six) hours as needed for wheezing  • Alcohol Prep 70 % Pads; AS DIRECTED  • apixaban 5 mg; Commonly known as: Eliquis; Take 1 tablet (5 mg total) by   mouth 2 (two) times a day  • aspirin 81 MG tablet  • bismuth subsalicylate 694 MG chewable tablet; Commonly known as: PEPTO   BISMOL; Chew 2 tablets (524 mg total) 4 (four) times a day (before meals   and at bedtime) for 10 days  • busPIRone 10 mg tablet; Commonly known as: BUSPAR; TAKE ONE TABLET BY   MOUTH TWICE DAILY  • CENTRUM SILVER 50+MEN PO  • cholecalciferol 1,000 units tablet; Commonly known as: VITAMIN D3; Take   1 tablet (1,000 Units total) by mouth daily  • * Edson Choice Comfort EZ 33G X 4 MM Misc; Generic drug: Insulin Pen   Needle  • * Edson Choice Comfort EZ 33G X 4 MM Misc; Generic drug: Insulin Pen   Needle; USE TO INJECT INSULIN 5 TIMES A DAY  • * B-D ULTRAFINE III SHORT PEN 31G X 8 MM Misc; Generic drug: Insulin Pen   Needle  • * Unifine SafeControl Pen Needle 30G X 5 MM Misc; Generic drug: Insulin   Pen Needle  • digoxin 0 25 mg tablet; Commonly known as: LANOXIN; Take 1 tablet (250   mcg total) by mouth daily  • fluticasone-umeclidinium-vilanterol 100-62 5-25 MCG/INH inhaler;   Commonly known as: TRELEGY; Inhale 1 puff daily Rinse mouth after use  • * FreeStyle Sheba 14 Day Sensor Misc; Use 1 application every 14   (fourteen) days  • * FreeStyle [place] 14 Day Sensor Misc; Use as directed-  • gabapentin 600 MG tablet; Commonly known as: Neurontin; Take 1 tablet   (600 mg total) by mouth 3 (three) times a day  • Icosapent Ethyl 1 g Caps; Commonly known as: Vascepa;  Take 2 capsules (2   g total) by mouth 2 (two) times a day  • lamoTRIgine 25 mg tablet; Commonly known as: LaMICtal  • losartan 50 mg tablet; Commonly known as: COZAAR; TAKE ONE TABLET BY   MOUTH DAILY  • metoprolol succinate 200 MG 24 hr tablet; Commonly known as: TOPROL-XL;   TAKE 1 TABLET DAILY  • NovoLOG FlexPen 100 UNIT/ML injection pen; Generic drug: insulin aspart;   INJECT 35 UNITS UNDER THE SKIN THREE TIMES DAILY WITH MEALS PER SLIDING   SCALE  • omeprazole 20 mg delayed release capsule; Commonly known as: PriLOSEC;   TAKE ONE CAPSULE BY MOUTH DAILY EVERY MORNING  • onetouch ultrasoft lancets; test blood sugar 3 (three) times a day  • potassium chloride 20 mEq tablet; Commonly known as: K-DUR,KLOR-CON; Take 1 tablet (20 mEq total) by mouth daily  • sertraline 50 mg tablet; Commonly known as: ZOLOFT; Take 1 tablet (50 mg   total) by mouth daily Daily at bedtime  • spironolactone 25 mg tablet; Commonly known as: ALDACTONE; Take 1 tablet   (25 mg total) by mouth daily  • tiZANidine 4 mg tablet; Commonly known as: Deloise Stank; Take 1 tablet (4 mg   total) by mouth every 8 (eight) hours as needed for muscle spasms  • Victoza injection; Generic drug: liraglutide; INJECT 0 3ML UNDER THE   SKIN EVERY MORNING  • vitamin C 1000 MG tablet  * This list has 6 medication(s) that are the same as other medications   prescribed for you  Read the directions carefully, and ask your doctor or   other care provider to review them with you  STOP taking these medications    • FLOWFLEX COVID-19 AG HOME TEST VI  • Insulin Glargine Solostar 100 UNIT/ML Sopn; Commonly known as: Lantus   SoloStar; Replaced by: insulin glargine 100 units/mL subcutaneous   injection  • LORazepam 0 5 mg tablet; Commonly known as: Ativan  • metFORMIN 500 mg 24 hr tablet; Commonly known as: GLUCOPHAGE-XR;   Replaced by: metFORMIN 1000 MG tablet        Disposition:   Home with home health care       Discharge Statement:  I spent 30 minutes discharging the patient  This time was spent on the day of discharge  I had direct contact with the patient on the day of discharge  Greater than 50% of the total time was spent examining patient, answering all patient questions, arranging and discussing plan of care with patient as well as directly providing post-discharge instructions  Additional time then spent on discharge activities       ** Please Note: This note has been constructed using a voice recognition system Claire Monae MD   05/11/23  3:01 PM

## 2023-05-11 NOTE — TELEPHONE ENCOUNTER
Pharmacy is calling to confirm that Rx for Lantus Solo Star is for Vials as patient has been prescribed pens in the past  And vials are correct as rx for syringe would need to be called in as well

## 2023-05-11 NOTE — PLAN OF CARE
Problem: RESPIRATORY - ADULT  Goal: Achieves optimal ventilation and oxygenation  Description: INTERVENTIONS:  - Assess for changes in respiratory status  - Assess for changes in mentation and behavior  - Position to facilitate oxygenation and minimize respiratory effort  - Oxygen administered by appropriate delivery if ordered  - Initiate smoking cessation education as indicated  - Encourage broncho-pulmonary hygiene including cough, deep breathe, Incentive Spirometry  - Assess the need for suctioning and aspirate as needed  - Assess and instruct to report SOB or any respiratory difficulty  - Respiratory Therapy support as indicated  Outcome: Progressing     Problem: INFECTION - ADULT  Goal: Absence or prevention of progression during hospitalization  Description: INTERVENTIONS:  - Assess and monitor for signs and symptoms of infection  - Monitor lab/diagnostic results  - Monitor all insertion sites, i e  indwelling lines, tubes, and drains  - Monitor endotracheal if appropriate and nasal secretions for changes in amount and color  - [place] appropriate cooling/warming therapies per order  - Administer medications as ordered  - Instruct and encourage patient and family to use good hand hygiene technique  - Identify and instruct in appropriate isolation precautions for identified infection/condition  Outcome: Progressing  Goal: Absence of fever/infection during neutropenic period  Description: INTERVENTIONS:  - Monitor WBC    Outcome: Progressing     Problem: SAFETY ADULT  Goal: Patient will remain free of falls  Description: INTERVENTIONS:  - Educate patient/family on patient safety including physical limitations  - Instruct patient to call for assistance with activity   - Consult OT/PT to assist with strengthening/mobility   - Keep Call bell within reach  - Keep bed low and locked with side rails adjusted as appropriate  - Keep care items and personal belongings within reach  - Initiate and maintain comfort rounds  - Make Fall Risk Sign visible to staff  - Offer Toileting every 2 Hours, in advance of need  - Initiate/Maintain bed alarm  - Obtain necessary fall risk management equipment:   - Apply yellow socks and bracelet for high fall risk patients  - Consider moving patient to room near nurses station  Outcome: Progressing  Goal: Maintain or return to baseline ADL function  Description: INTERVENTIONS:  -  Assess patient's ability to carry out ADLs; assess patient's baseline for ADL function and identify physical deficits which impact ability to perform ADLs (bathing, care of mouth/teeth, toileting, grooming, dressing, etc )  - Assess/evaluate cause of self-care deficits   - Assess range of motion  - Assess patient's mobility; develop plan if impaired  - Assess patient's need for assistive devices and provide as appropriate  - Encourage maximum independence but intervene and supervise when necessary  - Involve family in performance of ADLs  - Assess for home care needs following discharge   - Consider OT consult to assist with ADL evaluation and planning for discharge  - Provide patient education as appropriate  Outcome: Progressing  Goal: Maintains/Returns to pre admission functional level  Description: INTERVENTIONS:  - Perform BMAT or MOVE assessment daily    - Set and communicate daily mobility goal to care team and patient/family/caregiver  - Collaborate with rehabilitation services on mobility goals if consulted  - Perform Range of Motion 3 times a day  - Reposition patient every 2 hours    - Dangle patient 3 times a day  - Stand patient 3 times a day  - Ambulate patient 3 times a day  - Out of bed to chair 3 times a day   - Out of bed for meals 3 times a day  - Out of bed for toileting  - Record patient progress and toleration of activity level   Outcome: Progressing     Problem: DISCHARGE PLANNING  Goal: Discharge to home or other facility with appropriate resources  Description: INTERVENTIONS:  - Identify barriers to discharge w/patient and caregiver  - Arrange for needed discharge resources and transportation as appropriate  - Identify discharge learning needs (meds, wound care, etc )  - Arrange for interpretive services to assist at discharge as needed  - Refer to Case Management Department for coordinating discharge planning if the patient needs post-hospital services based on physician/advanced practitioner order or complex needs related to functional status, cognitive ability, or social support system  Outcome: Progressing     Problem: Knowledge Deficit  Goal: Patient/family/caregiver demonstrates understanding of disease process, treatment plan, medications, and discharge instructions  Description: Complete learning assessment and assess knowledge base    Interventions:  - Provide teaching at level of understanding  - Provide teaching via preferred learning methods  Outcome: Progressing     Problem: MOBILITY - ADULT  Goal: Maintain or return to baseline ADL function  Description: INTERVENTIONS:  -  Assess patient's ability to carry out ADLs; assess patient's baseline for ADL function and identify physical deficits which impact ability to perform ADLs (bathing, care of mouth/teeth, toileting, grooming, dressing, etc )  - Assess/evaluate cause of self-care deficits   - Assess range of motion  - Assess patient's mobility; develop plan if impaired  - Assess patient's need for assistive devices and provide as appropriate  - Encourage maximum independence but intervene and supervise when necessary  - Involve family in performance of ADLs  - Assess for home care needs following discharge   - Consider OT consult to assist with ADL evaluation and planning for discharge  - Provide patient education as appropriate  Outcome: Progressing  Goal: Maintains/Returns to pre admission functional level  Description: INTERVENTIONS:  - Perform BMAT or MOVE assessment daily    - Set and communicate daily mobility goal to care team and patient/family/caregiver  - Collaborate with rehabilitation services on mobility goals if consulted  - Perform Range of Motion 3 times a day  - Reposition patient every 2 hours  - Dangle patient 3 times a day  - Stand patient 3 times a day  - Ambulate patient 3 times a day  - Out of bed to chair 3 times a day   - Out of bed for meals 3 times a day  - Out of bed for toileting  - Record patient progress and toleration of activity level   Outcome: Progressing     Problem: Nutrition/Hydration-ADULT  Goal: Nutrient/Hydration intake appropriate for improving, restoring or maintaining nutritional needs  Description: Monitor and assess patient's nutrition/hydration status for malnutrition  Collaborate with interdisciplinary team and initiate plan and interventions as ordered  Monitor patient's weight and dietary intake as ordered or per policy  Utilize nutrition screening tool and intervene as necessary  Determine patient's food preferences and provide high-protein, high-caloric foods as appropriate       INTERVENTIONS:  - Monitor oral intake, urinary output, labs, and treatment plans  - Assess nutrition and hydration status and recommend course of action  - Evaluate amount of meals eaten  - Assist patient with eating if necessary   - Allow adequate time for meals  - Recommend/ encourage appropriate diets, oral nutritional supplements, and vitamin/mineral supplements  - Order, calculate, and assess calorie counts as needed  - Recommend, monitor, and adjust tube feedings and TPN/PPN based on assessed needs  - Assess need for intravenous fluids  - Provide specific nutrition/hydration education as appropriate  - Include patient/family/caregiver in decisions related to nutrition  Outcome: Progressing

## 2023-05-11 NOTE — CASE MANAGEMENT
Case Management Discharge Planning Note    Patient name Davina Flores  Location 2 [facility]  [facility] 56 MRN [id number]  : 1960 Date 2023       Current Admission Date: 2023  Current Admission Diagnosis:Leukocytosis   Patient Active Problem List    Diagnosis Date Noted   • Allergies 2023   • Hypomagnesemia 2023   • Hepatosplenomegaly 2023   • Leukocytosis 2023   • Pneumonia 2023   • Ambulatory dysfunction 2023   • Immunodeficiency, unspecified ([facility] ) 2022   • Hypo-osmolality and hyponatremia 2022   • Low back pain, unspecified 2022   • Chronic lymphocytic leukemia of B-cell type in remission ([facility] ) 2022   • Chronic obstructive pulmonary disease, unspecified ([place] [address] ) 2022   • Chronic pain syndrome 2022   • Foraminal stenosis of lumbar region 2022   • Lumbar post-laminectomy syndrome 2022   • Lumbar radiculopathy 2022   • Shortness of breath 2022   • SIRS (systemic inflammatory response syndrome) ([facility] ) 2022   • Dysuria 2021   • Other intervertebral disc degeneration, lumbar region 2021   • Bipolar disorder ([facility] ) 2021   • Hypertensive heart and chronic kidney disease with heart failure and stage 1 through stage 4 chronic kidney disease, or unspecified chronic kidney disease ([facility] ) 2021   • Paroxysmal atrial fibrillation ([facility] ) 2021   • Chronic respiratory failure with hypoxia ([facility] ) 2021   • Chronic kidney disease, stage 2 (mild) 2021   • Atherosclerotic heart disease of native coronary artery without angina pectoris 2021   • Peripheral neuropathy 2021   • Muscle weakness (generalized) 2021   • Platelets decreased ([facility] ) 2021   • Acute on chronic diastolic congestive heart failure ([facility] ) 2020   • Hyperlipidemia 10/29/2020   • Nutritional anemia, unspecified  10/29/2020   • Chest pain 10/11/2020   • Simple chronic bronchitis ([facility] ) 10/11/2020 • Hyperglycemia 05/01/2020   • Transition of care performed with sharing of clinical summary 04/11/2020   • Obstructive sleep apnea 02/02/2020   • Hyponatremia 02/02/2020   • COPD (chronic obstructive pulmonary disease) ([place] [address] ) 02/02/2020   • Chronic a-fib ([place] [address] ) 02/02/2020   • H/O vitamin D deficiency 10/28/2019   • Type 2 diabetes mellitus with complication, with long-term current use of insulin ([place] [address] ) 06/21/2019   • Restrictive ventilatory defect 03/26/2019   • 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 [facility]) 03/25/2019   • Lung disease, restrictive 11/21/2018   • Chronic respiratory failure ([place] [address] ) 10/31/2018   • Coronary artery disease 09/06/2017   • Esophageal reflux 09/06/2017   • ANA LILIA (acute kidney injury) ([place] [address] ) 03/20/2017   • Chronic low back pain 11/30/2016   • SHAVONNE and COPD overlap syndrome     • Morbid obesity     • Uncontrolled diabetes mellitus with hyperglycemia ([place] [address] ) 09/02/2016   • Fatigue 09/02/2016   • GERD 06/08/2016   • Cough 01/13/2016   • Psychiatric disorder    • 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/25/2015   • Erectile dysfunction of organic origin 08/25/2014   • Essential hypertension 09/04/2012   • Diabetic neuropathy ([place] [address] ) 09/04/2012   • Panic disorder without agoraphobia 09/04/2012   • Vitamin D deficiency 09/04/2012      LOS (days): 6  Geometric Mean LOS (GMLOS) (days): 2 90  Days to GMLOS:-3     OBJECTIVE:  Risk of Unplanned Readmission Score: 32 36     Current admission status: Inpatient   Preferred Pharmacy:   [address] [facility] [address]  [address] [place] [address]  Phone: [phone] Fax: [phone]    Primary Care Provider: MANAS Payne    Primary Insurance: [facility]  Secondary Insurance:     DISCHARGE DETAILS:    Discharge planning discussed with[de-identified] Patient  Freedom of Choice: Yes  Comments - Freedom of Choice: Discharge planned  Pt will be returning home with [facility]  Wheelchair [place] transportation has been arranged for pt  All plans reviewed with pt  No other discharge needs expressed  Requested [address]         Is the patient interested in [facility] at discharge?: Yes  [address] Name[de-identified] [facility]    Other Referral/Resources/Interventions Provided:  Interventions: [facility]  Referral Comments: [facility] aware of discharge  Order and AVS will be faxed to agency      Treatment Team Recommendation: Home with [address] [facility]  Discharge Destination Plan[de-identified] Home with [place] at Discharge : [address] [facility]  Dispatcher Contacted: Yes  Number/Name of Dispatcher: Roundtrip  Transported by [employer] and Unit #): Kyrieb  ETA of Transport (Date): 05/11/23  ETA of Transport (Time): 5117

## 2023-05-11 NOTE — PLAN OF CARE
Problem: INFECTION - ADULT  Goal: Absence of fever/infection during neutropenic period  Description: INTERVENTIONS:  - Monitor WBC    Outcome: Progressing     Problem: SAFETY ADULT  Goal: Maintain or return to baseline ADL function  Description: INTERVENTIONS:  -  Assess patient's ability to carry out ADLs; assess patient's baseline for ADL function and identify physical deficits which impact ability to perform ADLs (bathing, care of mouth/teeth, toileting, grooming, dressing, etc )  - Assess/evaluate cause of self-care deficits   - Assess range of motion  - Assess patient's mobility; develop plan if impaired  - Assess patient's need for assistive devices and provide as appropriate  - Encourage maximum independence but intervene and supervise when necessary  - Involve family in performance of ADLs  - Assess for home care needs following discharge   - Consider OT consult to assist with ADL evaluation and planning for discharge  - Provide patient education as appropriate  Outcome: Progressing     Problem: RESPIRATORY - ADULT  Goal: Achieves optimal ventilation and oxygenation  Description: INTERVENTIONS:  - Assess for changes in respiratory status  - Assess for changes in mentation and behavior  - Position to facilitate oxygenation and minimize respiratory effort  - Oxygen administered by appropriate delivery if ordered  - Initiate smoking cessation education as indicated  - Encourage broncho-pulmonary hygiene including cough, deep breathe, Incentive Spirometry  - Assess the need for suctioning and aspirate as needed  - Assess and instruct to report SOB or any respiratory difficulty  - Respiratory Therapy support as indicated  Outcome: Progressing

## 2023-05-12 ENCOUNTER — TELEPHONE (OUTPATIENT)
Dept: FAMILY MEDICINE CLINIC | Facility: CLINIC | Age: 63
End: 2023-05-12

## 2023-05-12 ENCOUNTER — PATIENT OUTREACH (OUTPATIENT)
Dept: FAMILY MEDICINE CLINIC | Facility: CLINIC | Age: 63
End: 2023-05-12

## 2023-05-12 DIAGNOSIS — E11.8 TYPE 2 DIABETES MELLITUS WITH COMPLICATION, WITH LONG-TERM CURRENT USE OF INSULIN (HCC): ICD-10-CM

## 2023-05-12 DIAGNOSIS — J44.9 CHRONIC OBSTRUCTIVE PULMONARY DISEASE, UNSPECIFIED COPD TYPE (HCC): ICD-10-CM

## 2023-05-12 DIAGNOSIS — F99 PSYCHIATRIC DISORDER: ICD-10-CM

## 2023-05-12 DIAGNOSIS — J96.11 CHRONIC RESPIRATORY FAILURE WITH HYPOXIA (HCC): ICD-10-CM

## 2023-05-12 DIAGNOSIS — E11.65 UNCONTROLLED TYPE 2 DIABETES MELLITUS WITH HYPERGLYCEMIA (HCC): Primary | ICD-10-CM

## 2023-05-12 DIAGNOSIS — N17.9 AKI (ACUTE KIDNEY INJURY) (HCC): ICD-10-CM

## 2023-05-12 DIAGNOSIS — I48.20 CHRONIC A-FIB (HCC): ICD-10-CM

## 2023-05-12 DIAGNOSIS — Z79.4 TYPE 2 DIABETES MELLITUS WITH COMPLICATION, WITH LONG-TERM CURRENT USE OF INSULIN (HCC): ICD-10-CM

## 2023-05-12 DIAGNOSIS — I10 BENIGN ESSENTIAL HYPERTENSION: ICD-10-CM

## 2023-05-12 DIAGNOSIS — R26.2 AMBULATORY DYSFUNCTION: ICD-10-CM

## 2023-05-12 DIAGNOSIS — I50.33 ACUTE ON CHRONIC DIASTOLIC CONGESTIVE HEART FAILURE (HCC): ICD-10-CM

## 2023-05-12 NOTE — NURSING NOTE
Patient's IV removed  Patient left with all belongings  AVS reviewed with patient and patient verbalized understanding  Patient left [address] [place] in stable condition via wheelchair with transporter

## 2023-05-12 NOTE — TELEPHONE ENCOUNTER
Attempted contacting patient to schedule appointment reached message saying subscriber is not taking calls at this time

## 2023-05-12 NOTE — PROGRESS NOTES
Received call from [address] [facility] [address] DAKOTAH Scott is requesting order be placed for VNA services  Order placed  Per Tong Scott, patient does not have working phone  Plans on getting it fixed over the weekend  Neighbors in [address] that have access to his apartment  Pt will require follow up home visit by Tuesday Taryn MALAGON  Complex episode opened   RN CM added to care team

## 2023-05-12 NOTE — TELEPHONE ENCOUNTER
----- Message from Carol Vargas MD sent at 5/11/2023  1:35 PM EDT -----  Regarding: Schedule TCM  Anthony Abad,     Please schedule up with pt after he gets his bloodwork drawn  Sent with script for CBC, BMP and magnesium in one week  Thank you so much!     Cathie Almazan

## 2023-05-15 ENCOUNTER — PATIENT OUTREACH (OUTPATIENT)
Dept: FAMILY MEDICINE CLINIC | Facility: CLINIC | Age: 63
End: 2023-05-15

## 2023-05-15 NOTE — PROGRESS NOTES
Outreach attempted  Phone number remains not in service  Outreach to Tuesday Taryn MALAGON  Advised that patient needs home visit and repeat labs drawn  Will work with Tuesday on scheduling home visit  Tuesday will tentatively see patient on 5/18  Outreach to Tori CLAIRE  Advised to fax lab requests to her  Lab requests faxed

## 2023-05-16 ENCOUNTER — PATIENT OUTREACH (OUTPATIENT)
Dept: FAMILY MEDICINE CLINIC | Facility: CLINIC | Age: 63
End: 2023-05-16

## 2023-05-16 ENCOUNTER — TELEPHONE (OUTPATIENT)
Dept: HEMATOLOGY ONCOLOGY | Facility: CLINIC | Age: 63
End: 2023-05-16

## 2023-05-16 NOTE — TELEPHONE ENCOUNTER
I spoke with the patients friend Gavin Hong in regards to scheduling the patient for a follow up with Dr Patricia Jade  Informed Gavin Hong that we can schedule him for 6/21/23 at 12:40pm or 7/19/23 at 12:40pm per Dr Patricia Hong states she will get in contact with the patient and call the office back  Cl Hong with office number [phone]  Gavin Hong understood

## 2023-05-16 NOTE — PROGRESS NOTES
JERMAINE had received a referral from RN KENDAL Goldberg r/t financial difficulties  MANSI had completed a chart review  Per chart, patient recently in the ED on 5/5 due to leukocytosis and discharged 5/11  Per chart, patient lives alone  Per chart, patient has friends/neighbors and home care staff for support (MLTSS-A 5 days per week for 3-4 hours per day)  Per chart, patient has SS income  Per chart, patient has a friend/POA, Chunjia Martyr  Per chart, patient uses [employer] if neede for transportation  Per chart, patient expressed to IP CM making some mistakes with his banking and request assistance to correct issues  Per chart, IP CM gave patient resource for [facility] to further assist      JERMAINE had called the patient via phone  JERMAINE unable to leave a voicemail as number is not in service  JERMAINE will try to call again at a later date and time  JERMAINE routed note to [facility]  SWCM will continue to be available

## 2023-05-17 ENCOUNTER — TELEPHONE (OUTPATIENT)
Dept: HEMATOLOGY ONCOLOGY | Facility: CLINIC | Age: 63
End: 2023-05-17

## 2023-05-17 NOTE — TELEPHONE ENCOUNTER
Appointment Schedule   Who are you speaking with? Patient   If it is not the patient, are they listed on an active communication consent form? N/A   Which provider is the appointment scheduled with? Dr Pamella Márquez   At which location is the appointment scheduled for? [facility]   When is the appointment scheduled? Please list date and time 06/21 at 12:40pm    What is the reason for this appointment? Follow up    Did patient voice understanding of the details of this appointment? Yes   Was the no show policy reviewed with patient?  Yes

## 2023-05-19 ENCOUNTER — PATIENT OUTREACH (OUTPATIENT)
Dept: FAMILY MEDICINE CLINIC | Facility: CLINIC | Age: 63
End: 2023-05-19

## 2023-05-19 ENCOUNTER — IN HOME VISIT (OUTPATIENT)
Dept: FAMILY MEDICINE CLINIC | Facility: CLINIC | Age: 63
End: 2023-05-19

## 2023-05-19 ENCOUNTER — TELEPHONE (OUTPATIENT)
Dept: FAMILY MEDICINE CLINIC | Facility: CLINIC | Age: 63
End: 2023-05-19

## 2023-05-19 ENCOUNTER — APPOINTMENT (OUTPATIENT)
Dept: LAB | Facility: CLINIC | Age: 63
End: 2023-05-19

## 2023-05-19 VITALS — TEMPERATURE: 98 F | OXYGEN SATURATION: 99 % | RESPIRATION RATE: 18 BRPM | HEART RATE: 80 BPM

## 2023-05-19 DIAGNOSIS — I48.0 PAROXYSMAL ATRIAL FIBRILLATION (HCC): ICD-10-CM

## 2023-05-19 DIAGNOSIS — E83.42 HYPOMAGNESEMIA: ICD-10-CM

## 2023-05-19 DIAGNOSIS — I13.0 HYPERTENSIVE HEART AND CHRONIC KIDNEY DISEASE WITH HEART FAILURE AND STAGE 1 THROUGH STAGE 4 CHRONIC KIDNEY DISEASE, OR UNSPECIFIED CHRONIC KIDNEY DISEASE (HCC): ICD-10-CM

## 2023-05-19 DIAGNOSIS — G60.8 ACUTE SENSORY NEUROPATHY: ICD-10-CM

## 2023-05-19 DIAGNOSIS — E11.42 DIABETIC POLYNEUROPATHY ASSOCIATED WITH TYPE 2 DIABETES MELLITUS (HCC): ICD-10-CM

## 2023-05-19 DIAGNOSIS — C91.11 CHRONIC LYMPHOCYTIC LEUKEMIA OF B-CELL TYPE IN REMISSION (HCC): ICD-10-CM

## 2023-05-19 DIAGNOSIS — D72.829 LEUKOCYTOSIS: ICD-10-CM

## 2023-05-19 DIAGNOSIS — J44.9 CHRONIC OBSTRUCTIVE PULMONARY DISEASE, UNSPECIFIED COPD TYPE (HCC): ICD-10-CM

## 2023-05-19 DIAGNOSIS — F99 PSYCHIATRIC DISORDER: ICD-10-CM

## 2023-05-19 LAB
ANION GAP SERPL CALCULATED.3IONS-SCNC: 6 MMOL/L (ref 4–13)
BASOPHILS # BLD MANUAL: 0 THOUSAND/UL (ref 0–0.1)
BASOPHILS NFR MAR MANUAL: 0 % (ref 0–1)
BUN SERPL-MCNC: 9 MG/DL (ref 5–25)
CALCIUM SERPL-MCNC: 9.3 MG/DL (ref 8.3–10.1)
CHLORIDE SERPL-SCNC: 92 MMOL/L (ref 96–108)
CO2 SERPL-SCNC: 25 MMOL/L (ref 21–32)
CREAT SERPL-MCNC: 1.07 MG/DL (ref 0.6–1.3)
DME PARACHUTE DELIVERY DATE REQUESTED: NORMAL
DME PARACHUTE ITEM DESCRIPTION: NORMAL
DME PARACHUTE ORDER STATUS: NORMAL
DME PARACHUTE SUPPLIER NAME: NORMAL
DME PARACHUTE SUPPLIER PHONE: NORMAL
EOSINOPHIL # BLD MANUAL: 0 THOUSAND/UL (ref 0–0.4)
EOSINOPHIL NFR BLD MANUAL: 0 % (ref 0–6)
ERYTHROCYTE [DISTWIDTH] IN BLOOD BY AUTOMATED COUNT: 12.8 % (ref 11.6–15.1)
GFR SERPL CREATININE-BSD FRML MDRD: 73 ML/MIN/1.73SQ M
GLUCOSE SERPL-MCNC: 183 MG/DL (ref 65–140)
HCT VFR BLD AUTO: 41.4 % (ref 36.5–49.3)
HGB BLD-MCNC: 15.1 G/DL (ref 12–17)
LYMPHOCYTES # BLD AUTO: 15.37 THOUSAND/UL (ref 0.6–4.47)
LYMPHOCYTES # BLD AUTO: 61 % (ref 14–44)
MAGNESIUM SERPL-MCNC: 1.9 MG/DL (ref 1.6–2.6)
MCH RBC QN AUTO: 32.8 PG (ref 26.8–34.3)
MCHC RBC AUTO-ENTMCNC: 36.5 G/DL (ref 31.4–37.4)
MCV RBC AUTO: 90 FL (ref 82–98)
MONOCYTES # BLD AUTO: 0.25 THOUSAND/UL (ref 0–1.22)
MONOCYTES NFR BLD: 1 % (ref 4–12)
NEUTROPHILS # BLD MANUAL: 8.56 THOUSAND/UL (ref 1.85–7.62)
NEUTS SEG NFR BLD AUTO: 34 % (ref 43–75)
PLATELET # BLD AUTO: 224 THOUSANDS/UL (ref 149–390)
PLATELET BLD QL SMEAR: ADEQUATE
PMV BLD AUTO: 9.7 FL (ref 8.9–12.7)
POTASSIUM SERPL-SCNC: 4.5 MMOL/L (ref 3.5–5.3)
RBC # BLD AUTO: 4.61 MILLION/UL (ref 3.88–5.62)
SMUDGE CELLS BLD QL SMEAR: PRESENT
SODIUM SERPL-SCNC: 123 MMOL/L (ref 135–147)
VARIANT LYMPHS # BLD AUTO: 4 %
WBC # BLD AUTO: 25.19 THOUSAND/UL (ref 4.31–10.16)

## 2023-05-19 RX ORDER — OXYCODONE HYDROCHLORIDE AND ACETAMINOPHEN 5; 325 MG/1; MG/1
1 TABLET ORAL EVERY 8 HOURS PRN
Qty: 21 TABLET | Refills: 0 | Status: SHIPPED | OUTPATIENT
Start: 2023-05-19 | End: 2023-05-26

## 2023-05-19 RX ORDER — QUETIAPINE FUMARATE 300 MG/1
300 TABLET, FILM COATED ORAL
Qty: 30 TABLET | Refills: 1 | Status: SHIPPED | OUTPATIENT
Start: 2023-05-19 | End: 2023-06-18

## 2023-05-19 NOTE — TELEPHONE ENCOUNTER
Hi Tuesday,  Patient is requesting an Home Visit he did not disclose what he needed to be seen for  He does not have a contact number at this time however he available to communicate via [address] [place] [address]

## 2023-05-19 NOTE — PROGRESS NOTES
Received phone call from Tuesday [place]  She will be doing home visit on patient  Tuesday will draw labs today  Pt placed on percocet for sensory neuropathy  RN CM will follow up

## 2023-05-19 NOTE — PROGRESS NOTES
Name: Enrique Chow      : 1960      MRN: [id number]  Encounter Provider: Tuesday Brian Kussmaul, CRNP  Encounter Date: 2023   Encounter department: [facility]     1  Acute sensory neuropathy  -     oxyCODONE-acetaminophen (Percocet) 5-325 mg per tablet; Take 1 tablet by mouth every 8 (eight) hours as needed for moderate pain for up to 7 days Max Daily Amount: 3 tablets    2  Chronic lymphocytic leukemia of B-cell type in remission (HCC)        -    CBC drawn    3  Diabetic polyneuropathy associated with type 2 diabetes mellitus ([place] [address] )        -continue gabapentin        -control diabetes with insulin and diet    4  Psychiatric disorder  -     QUEtiapine (SEROquel) 300 mg tablet; Take 1 tablet (300 mg total) by mouth daily at bedtime    5  Chronic obstructive pulmonary disease, unspecified COPD type ([place] [address] )        -bipap and 02 prn SOB    6  Hypertensive heart and chronic kidney disease with heart failure and stage 1 through stage 4 chronic kidney disease, or unspecified chronic kidney disease ([place] [address] )        -med management and sodium reduction in diet        -BMP drawn    7  Paroxysmal atrial fibrillation (HCC)        -continue metoprolol    8  Hypomagnesemia        -Mg level drawn      BMI Counseling: There is no height or weight on file to calculate BMI  The BMI is above normal  Nutrition recommendations include consuming healthier snacks and limiting drinks that contain sugar  Exercise recommendations include exercising 3-5 times per week  No pharmacotherapy was ordered  Rationale for BMI follow-up plan is due to patient being overweight or obese  Has been losing weight but still weighs 250#        Subjective      Patient seen today for visit s/p hospitalization for PNA  Patients lungs are clear  His primary complaint is severe neuropathy of extremities bilaterally probably related to abrupt discontinuation of seroquel-has diabetic neuropathy as well  Gene Mays   Re-ordered night time seroquel  He is having tele-health appointment with his psychiatrist tonight who can order all psych medications but pharmacy will be closed by then  Ordered percocet for 7 days to assist with pain as patient cannot take tramadol due to drug reaction  Patient is low risk for substance abuse  Unable to take BP due to skin pain but able to draw CBC, BMP, and Mg as requested for hospital follow up  Isidra Marlow is home bound due to decline in health due to leukemia, deacreased mobility, and weakness due to CHF and COPD  He is requesting a wheelchair to move around his apartment  >40 minutes was spent with Alonzo performing history, exam and assessment, planning and education  Isidra Marlow continues to require ongoing diet education as he is diabetic and has a poor diet  He did say his 3 day BG average is 149  Patient has VN coming to the home and an Aide [address]  Review of Systems   Constitutional: Negative  HENT: Negative  Eyes: Negative  Respiratory: Negative  Cardiovascular: Negative  Gastrointestinal: Negative  Endocrine: Negative  Genitourinary: Negative  Musculoskeletal: Negative  Skin: Negative  Neurological: Positive for numbness  C/o severe neuralgia possibly due to sudden d/cof seroquel while in hospital   Hematological: Negative  Psychiatric/Behavioral: Negative          Current Outpatient Medications on File Prior to Visit   Medication Sig   • acetaminophen (TYLENOL) 325 mg tablet Take 2 tablets (650 mg total) by mouth every 6 (six) hours as needed for mild pain, headaches or fever   • albuterol (2 5 mg/3 mL) 0 083 % nebulizer solution Take 1 vial (2 5 mg total) by nebulization every 6 (six) hours as needed for wheezing   • Alcohol Swabs (Alcohol Prep) 70 % PADS AS DIRECTED   • apixaban (Eliquis) 5 mg Take 1 tablet (5 mg total) by mouth 2 (two) times a day   • Ascorbic Acid (VITAMIN C) 1000 MG tablet Take 1,000 mg by mouth daily   • aspirin 81 MG tablet Take 81 mg by mouth every morning     • atorvastatin (LIPITOR) 40 mg tablet Take 1 tablet (40 mg total) by mouth daily with dinner   • B-D ULTRAFINE III SHORT PEN 31G X 8 MM MISC Use as directed   • benzonatate (TESSALON PERLES) 100 mg capsule Take 1 capsule (100 mg total) by mouth 3 (three) times a day   • busPIRone (BUSPAR) 10 mg tablet TAKE ONE TABLET BY MOUTH TWICE DAILY   • cholecalciferol (VITAMIN D3) 1,000 units tablet Take 1 tablet (1,000 Units total) by mouth daily   • [place] Choice Comfort EZ 33G X 4 MM MISC USE TO INJECT INSULIN 5 TIMES A DAY   • Continuous Blood Gluc Sensor (FreeStyle [place] 14 Day Sensor) MISC Use 1 application every 14 (fourteen) days   • Continuous Blood Gluc Sensor (FreeStyle [place] 14 Day Sensor) MISC Use as directed-   • digoxin (LANOXIN) 0 25 mg tablet Take 1 tablet (250 mcg total) by mouth daily   • fluticasone (FLONASE) 50 mcg/act nasal spray 1 spray into each nostril daily Do not start before May 12, 2023  • fluticasone-umeclidinium-vilanterol (TRELEGY) 100-62 5-25 MCG/INH inhaler Inhale 1 puff daily Rinse mouth after use     • gabapentin (Neurontin) 600 MG tablet Take 1 tablet (600 mg total) by mouth 3 (three) times a day   • Icosapent Ethyl (Vascepa) 1 g CAPS Take 2 capsules (2 g total) by mouth 2 (two) times a day   • Insulin Glargine Solostar (Lantus SoloStar) 100 UNIT/ML SOPN Inject 0 45 mL (45 Units total) under the skin 2 (two) times a day   • insulin lispro (HumaLOG KwikPen) 100 units/mL injection pen Inject 20 Units under the skin 3 (three) times a day with meals   • Insulin Pen Needle ([place] Choice Comfort EZ) 33G X 4 MM MISC USE TO INJECT INSULIN 5 TIMES A DAY   • lamoTRIgine (LaMICtal) 25 mg tablet 25 mg daily at bedtime   • Lancet Devices (Adjustable Lancing Device) MISC USE AS DIRECTED   • Lancets (onetouch ultrasoft) lancets test blood sugar 3 (three) times a day   • losartan (COZAAR) 50 mg tablet TAKE ONE TABLET BY MOUTH DAILY   • magnesium (MAGTAB) 84 MG (7MEQ) TBCR Take 1 tablet (84 mg total) by mouth daily   • metFORMIN (GLUCOPHAGE) 1000 MG tablet Take 1 tablet (1,000 mg total) by mouth 2 (two) times a day with meals   • metoprolol succinate (TOPROL-XL) 200 MG 24 hr tablet TAKE 1 TABLET DAILY   • Multiple Vitamins-Minerals (CENTRUM SILVER 50+MEN PO) Take by mouth   • omeprazole (PriLOSEC) 20 mg delayed release capsule TAKE ONE CAPSULE BY MOUTH DAILY EVERY MORNING   • potassium chloride (K-DUR,KLOR-CON) 20 mEq tablet Take 1 tablet (20 mEq total) by mouth daily   • sertraline (ZOLOFT) 50 mg tablet Take 1 tablet (50 mg total) by mouth daily Daily at bedtime   • spironolactone (ALDACTONE) 25 mg tablet Take 1 tablet (25 mg total) by mouth daily   • tiZANidine (ZANAFLEX) 4 mg tablet Take 1 tablet (4 mg total) by mouth every 8 (eight) hours as needed for muscle spasms   • Unifine SafeControl Pen Needle 30G X 5 MM MISC    • Victoza injection INJECT 0 3ML UNDER THE SKIN EVERY MORNING   • [DISCONTINUED] QUEtiapine (SEROquel) 300 mg tablet Take 1 tablet (300 mg total) by mouth daily at bedtime       Objective     Pulse 80   Temp 98 °F (36 7 °C)   Resp 18   SpO2 99%     Physical Exam  Constitutional:       General: He is not in acute distress  Appearance: Normal appearance  He is obese  He is not ill-appearing, toxic-appearing or diaphoretic  HENT:      Head: Normocephalic and atraumatic  Right Ear: External ear normal       Left Ear: External ear normal       Nose: Nose normal  No congestion  Mouth/Throat:      Mouth: Mucous membranes are moist    Eyes:      General:         Right eye: No discharge  Left eye: No discharge  Conjunctiva/sclera: Conjunctivae normal    Cardiovascular:      Rate and Rhythm: Normal rate and regular rhythm  Pulses: no weak pulses     Heart sounds: Normal heart sounds  Pulmonary:      Effort: Pulmonary effort is normal  No respiratory distress  Breath sounds: Normal breath sounds  No wheezing, rhonchi or rales  Abdominal:      General: Bowel sounds are normal       Palpations: Abdomen is soft  Tenderness: There is no abdominal tenderness  There is no guarding  Musculoskeletal:         General: Normal range of motion  Cervical back: Normal range of motion  No rigidity  Right lower leg: No edema  Left lower leg: No edema  Right ankle:  over the base of 5th metatarsal       Left ankle:  over the base of 5th metatarsal       Right foot: Bony tenderness present  Left foot: Bony tenderness present  Feet:      Right foot:      Skin integrity: No ulcer, skin breakdown, erythema, warmth, callus or dry skin  Left foot:      Skin integrity: No ulcer, skin breakdown, erythema, warmth, callus or dry skin  Skin:     General: Skin is warm and dry  Findings: Bruising present  Comments: Bruising from IV sticks in hospital   Neurological:      Mental Status: He is alert and oriented to person, place, and time  Motor: Weakness present  Gait: Gait normal       Comments: C/o severe neuropathy of arms and legs since last day of hospital and release-has been off of seroquel-percocet ordered for 7 days-has med reaction to tramadol-skin so sensitive unable to take BP-was able to draw blood   Psychiatric:         Mood and Affect: Mood normal          Behavior: Behavior normal          Thought Content: Thought content normal          Judgment: Judgment normal        Tuesday [place], CRNPDiabetic Foot Exam    Patient's shoes and socks removed  Right Foot/Ankle   Right Foot Inspection  Skin Exam: skin normal and skin intact  No dry skin, no warmth, no callus, no erythema, no maceration, no abnormal color, no pre-ulcer, no ulcer and no callus  Toe Exam: ROM and strength within normal limits       Right Toe  - Comprehensive Exam  Ecchymosis: none  Arch: normal  Hammertoes: absent  Claw Toes: absent  Swelling: none   Tenderness: calcaneus tenderness, bony tenderness, fifth metatarsal base, great toe interphalangeal joint, great toe metatarsophalangeal joint, lesser metatarsophalangeal joints, metatarsals, navicular, neuroma and plantar fascia         Left Foot/Ankle  Left Foot Inspection  Skin Exam: skin normal and skin intact  No dry skin, no warmth, no erythema, no maceration, normal color, no pre-ulcer, no ulcer and no callus  Toe Exam: ROM and strength within normal limits       Left Toe  - Comprehensive Exam  Ecchymosis: none  Arch: normal  Hammertoes: absent  Claw toes: absent  Swelling: none   Tenderness: bony tenderness, fifth metatarsal base, great toe interphalangeal joint, great toe metatarsophalangeal joint, lesser metatarsophalangeal joints, metatarsals, navicular, neuroma, plantar fascia and calcaneus tenderness           Assign Risk Category  No deformity present  No loss of protective sensation  No weak pulses  Risk: 0

## 2023-05-22 ENCOUNTER — PATIENT OUTREACH (OUTPATIENT)
Dept: FAMILY MEDICINE CLINIC | Facility: CLINIC | Age: 63
End: 2023-05-22

## 2023-05-22 ENCOUNTER — TELEPHONE (OUTPATIENT)
Dept: FAMILY MEDICINE CLINIC | Facility: CLINIC | Age: 63
End: 2023-05-22

## 2023-05-22 LAB

## 2023-05-22 NOTE — LETTER
[facility] [address]    Re: Anoop Lopez to reach   5/22/2023       Dear James Valadez,    We tried to reach you by phone and was unfortunately unable to reach you  It is important that you contact the Outpatient Care Manager from [address] [place] [address] at [phone]      Sincerely,         [address] [place] [address]

## 2023-05-22 NOTE — PROGRESS NOTES
SWCM had called the patient via phone  SWCM unable to leave a voicemail as number is not in service  SWCM mailed out unable to reach letter today  SW routed note MARY ANN Calero  [facility] closed referral  Please reconsult for future needs

## 2023-05-23 ENCOUNTER — TELEPHONE (OUTPATIENT)
Dept: FAMILY MEDICINE CLINIC | Facility: CLINIC | Age: 63
End: 2023-05-23

## 2023-05-23 NOTE — TELEPHONE ENCOUNTER
Received Plan of Treatment/Interim Order form from VNA  Scanned copy into encounter  Placed in red clinical folder

## 2023-05-25 ENCOUNTER — TELEPHONE (OUTPATIENT)
Dept: FAMILY MEDICINE CLINIC | Facility: CLINIC | Age: 63
End: 2023-05-25

## 2023-05-25 DIAGNOSIS — E87.1 HYPONATREMIA: Primary | ICD-10-CM

## 2023-05-25 RX ORDER — SODIUM CHLORIDE 1 G/1
1 TABLET ORAL DAILY
Qty: 30 TABLET | Refills: 0 | Status: SHIPPED | OUTPATIENT
Start: 2023-05-25 | End: 2023-06-24

## 2023-06-01 ENCOUNTER — PATIENT OUTREACH (OUTPATIENT)
Dept: FAMILY MEDICINE CLINIC | Facility: CLINIC | Age: 63
End: 2023-06-01

## 2023-06-01 LAB

## 2023-06-01 NOTE — PROGRESS NOTES
Second attempt to reach patient  Pts number has recording that patient is unavailable  Unable to reach letter sent  Episode closed

## 2023-06-01 NOTE — LETTER
06/01/23    Dear Cecilio Trimble,   [address] is committed to your health and satisfaction  We often contact patients about services to help you manage your health  These services are available to you at no extra charge  I have not been able to contact you by phone  If you are interested in extra help managing your health, please call in the next 10 days  Please contact me directly at [phone]  Monday through Friday from 8 am to 4:30pm     I look forward to hearing from you      Sincerely,  bAdullahi Rivera [address]

## 2023-06-07 DIAGNOSIS — J44.9 CHRONIC OBSTRUCTIVE PULMONARY DISEASE, UNSPECIFIED COPD TYPE (HCC): ICD-10-CM

## 2023-06-07 RX ORDER — ALBUTEROL SULFATE 2.5 MG/3ML
2.5 SOLUTION RESPIRATORY (INHALATION) EVERY 6 HOURS PRN
Qty: 375 ML | Refills: 5 | Status: SHIPPED | OUTPATIENT
Start: 2023-06-07

## 2023-06-08 LAB

## 2023-06-09 ENCOUNTER — PATIENT OUTREACH (OUTPATIENT)
Dept: FAMILY MEDICINE CLINIC | Facility: CLINIC | Age: 63
End: 2023-06-09

## 2023-06-09 NOTE — PROGRESS NOTES
Received phone call from patient  Pt states he has a new phone  Number updated  Services being received through [facility]  Uses [employer] to get to appointments  [employer] scooter will be repaired this week  Uses walker  A1C 11 3  Pt has Castro ROMEO this am 139, 208 yesterday  Average 252 for the month  Receiving [employer]  Humalog insulin 15 units with meals    Lantus insulin 45 units twice a day  Victoza as directed  Has follow up with Dr Joshua Rowe for Leukemia  Has MLTSS  Arnav Ogden is his MLTSS CM  Pt will reach out to RN CM if any questions or concerns arise

## 2023-06-13 DIAGNOSIS — K21.9 GASTROESOPHAGEAL REFLUX DISEASE: ICD-10-CM

## 2023-06-13 DIAGNOSIS — I25.10 CORONARY ARTERY DISEASE INVOLVING NATIVE HEART WITHOUT ANGINA PECTORIS, UNSPECIFIED VESSEL OR LESION TYPE: ICD-10-CM

## 2023-06-13 DIAGNOSIS — I50.22 CHRONIC SYSTOLIC HEART FAILURE (HCC): ICD-10-CM

## 2023-06-13 DIAGNOSIS — K21.9 GASTROESOPHAGEAL REFLUX DISEASE WITHOUT ESOPHAGITIS: ICD-10-CM

## 2023-06-13 DIAGNOSIS — F99 PSYCHIATRIC DISORDER: ICD-10-CM

## 2023-06-13 DIAGNOSIS — E78.2 MIXED HYPERLIPIDEMIA: ICD-10-CM

## 2023-06-13 PROCEDURE — 4010F ACE/ARB THERAPY RXD/TAKEN: CPT

## 2023-06-13 RX ORDER — OMEPRAZOLE 20 MG/1
CAPSULE, DELAYED RELEASE ORAL
Qty: 30 CAPSULE | Refills: 1 | Status: SHIPPED | OUTPATIENT
Start: 2023-06-13 | End: 2023-06-13 | Stop reason: SDUPTHER

## 2023-06-13 RX ORDER — OMEPRAZOLE 20 MG/1
20 CAPSULE, DELAYED RELEASE ORAL DAILY
Qty: 90 CAPSULE | Refills: 3 | Status: SHIPPED | OUTPATIENT
Start: 2023-06-13 | End: 2023-07-13

## 2023-06-13 RX ORDER — QUETIAPINE FUMARATE 300 MG/1
300 TABLET, FILM COATED ORAL
Qty: 30 TABLET | Refills: 1 | Status: SHIPPED | OUTPATIENT
Start: 2023-06-13 | End: 2023-07-13

## 2023-06-13 RX ORDER — QUETIAPINE FUMARATE 100 MG/1
100 TABLET, FILM COATED ORAL
Qty: 30 TABLET | Refills: 6 | Status: SHIPPED | OUTPATIENT
Start: 2023-06-13 | End: 2023-07-13

## 2023-06-13 RX ORDER — INSULIN GLARGINE 100 [IU]/ML
INJECTION, SOLUTION SUBCUTANEOUS
COMMUNITY
Start: 2023-05-11 | End: 2023-06-13 | Stop reason: DRUGHIGH

## 2023-06-13 RX ORDER — QUETIAPINE FUMARATE 100 MG/1
TABLET, FILM COATED ORAL
COMMUNITY
Start: 2023-05-30 | End: 2023-06-13 | Stop reason: SDUPTHER

## 2023-06-13 RX ORDER — ATORVASTATIN CALCIUM 40 MG/1
40 TABLET, FILM COATED ORAL
Qty: 90 TABLET | Refills: 3 | Status: SHIPPED | OUTPATIENT
Start: 2023-06-13 | End: 2023-07-13

## 2023-06-13 RX ORDER — LOSARTAN POTASSIUM 50 MG/1
50 TABLET ORAL DAILY
Qty: 90 TABLET | Refills: 3 | Status: SHIPPED | OUTPATIENT
Start: 2023-06-13 | End: 2023-07-13

## 2023-06-13 RX ORDER — METOPROLOL SUCCINATE 200 MG/1
200 TABLET, EXTENDED RELEASE ORAL DAILY
Qty: 90 TABLET | Refills: 6 | Status: SHIPPED | OUTPATIENT
Start: 2023-06-13 | End: 2031-09-29

## 2023-06-13 NOTE — PROGRESS NOTES
Spoke to patient regarding his medication renewL REQUEST   iN ADDITION HE IS REQUESTING Milind Ruggiero

## 2023-06-14 ENCOUNTER — TELEPHONE (OUTPATIENT)
Dept: HEMATOLOGY ONCOLOGY | Facility: CLINIC | Age: 63
End: 2023-06-14

## 2023-06-14 LAB
DME PARACHUTE DELIVERY DATE EXPECTED: NORMAL
DME PARACHUTE DELIVERY DATE REQUESTED: NORMAL
DME PARACHUTE ITEM DESCRIPTION: NORMAL
DME PARACHUTE ITEM DESCRIPTION: NORMAL
DME PARACHUTE ORDER STATUS: NORMAL
DME PARACHUTE SUPPLIER NAME: NORMAL
DME PARACHUTE SUPPLIER PHONE: NORMAL

## 2023-06-14 NOTE — TELEPHONE ENCOUNTER
LVM to the patient in regards to his lab work that needs to be completed prior to his appt on 6/21/23 at 12:40pm

## 2023-06-19 DIAGNOSIS — E83.42 HYPOMAGNESEMIA: ICD-10-CM

## 2023-06-19 DIAGNOSIS — E87.1 HYPONATREMIA: ICD-10-CM

## 2023-06-19 RX ORDER — MAGNESIUM L-LACTATE 84 MG
84 TABLET, EXTENDED RELEASE ORAL DAILY
Qty: 30 TABLET | Refills: 6 | Status: SHIPPED | OUTPATIENT
Start: 2023-06-19 | End: 2023-07-19

## 2023-06-19 RX ORDER — SODIUM CHLORIDE 1 G/1
1 TABLET ORAL DAILY
Qty: 30 TABLET | Refills: 3 | Status: SHIPPED | OUTPATIENT
Start: 2023-06-19 | End: 2023-07-19

## 2023-06-20 ENCOUNTER — TELEPHONE (OUTPATIENT)
Dept: HEMATOLOGY ONCOLOGY | Facility: CLINIC | Age: 63
End: 2023-06-20

## 2023-06-20 ENCOUNTER — PATIENT OUTREACH (OUTPATIENT)
Dept: FAMILY MEDICINE CLINIC | Facility: CLINIC | Age: 63
End: 2023-06-20

## 2023-06-20 ENCOUNTER — APPOINTMENT (OUTPATIENT)
Dept: LAB | Facility: CLINIC | Age: 63
End: 2023-06-20
Payer: COMMERCIAL

## 2023-06-20 DIAGNOSIS — C91.10 LEUKEMIA, LYMPHOCYTIC, CHRONIC (HCC): Primary | ICD-10-CM

## 2023-06-20 DIAGNOSIS — C91.10 CLL (CHRONIC LYMPHOCYTIC LEUKEMIA) (HCC): Primary | ICD-10-CM

## 2023-06-20 LAB
ALBUMIN SERPL BCP-MCNC: 4.1 G/DL (ref 3.5–5)
ALP SERPL-CCNC: 60 U/L (ref 46–116)
ALT SERPL W P-5'-P-CCNC: 36 U/L (ref 12–78)
ANION GAP SERPL CALCULATED.3IONS-SCNC: 9 MMOL/L
AST SERPL W P-5'-P-CCNC: 21 U/L (ref 5–45)
BASOPHILS # BLD MANUAL: 0.21 THOUSAND/UL (ref 0–0.1)
BASOPHILS NFR MAR MANUAL: 1 % (ref 0–1)
BILIRUB SERPL-MCNC: 0.71 MG/DL (ref 0.2–1)
BUN SERPL-MCNC: 12 MG/DL (ref 5–25)
CALCIUM SERPL-MCNC: 9.4 MG/DL (ref 8.3–10.1)
CHLORIDE SERPL-SCNC: 97 MMOL/L (ref 96–108)
CO2 SERPL-SCNC: 23 MMOL/L (ref 21–32)
CREAT SERPL-MCNC: 0.91 MG/DL (ref 0.6–1.3)
DIFFERENTIAL COMMENT: ABNORMAL
EOSINOPHIL # BLD MANUAL: 0.62 THOUSAND/UL (ref 0–0.4)
EOSINOPHIL NFR BLD MANUAL: 3 % (ref 0–6)
ERYTHROCYTE [DISTWIDTH] IN BLOOD BY AUTOMATED COUNT: 12.2 % (ref 11.6–15.1)
GFR SERPL CREATININE-BSD FRML MDRD: 89 ML/MIN/1.73SQ M
GLUCOSE P FAST SERPL-MCNC: 194 MG/DL (ref 65–99)
HCT VFR BLD AUTO: 42.7 % (ref 36.5–49.3)
HGB BLD-MCNC: 14.4 G/DL (ref 12–17)
LYMPHOCYTES # BLD AUTO: 11.1 THOUSAND/UL (ref 0.6–4.47)
LYMPHOCYTES # BLD AUTO: 54 % (ref 14–44)
MCH RBC QN AUTO: 31.4 PG (ref 26.8–34.3)
MCHC RBC AUTO-ENTMCNC: 33.7 G/DL (ref 31.4–37.4)
MCV RBC AUTO: 93 FL (ref 82–98)
MONOCYTES # BLD AUTO: 0.82 THOUSAND/UL (ref 0–1.22)
MONOCYTES NFR BLD: 4 % (ref 4–12)
NEUTROPHILS # BLD MANUAL: 5.55 THOUSAND/UL (ref 1.85–7.62)
NEUTS SEG NFR BLD AUTO: 27 % (ref 43–75)
PLATELET # BLD AUTO: 198 THOUSANDS/UL (ref 149–390)
PLATELET BLD QL SMEAR: ADEQUATE
PMV BLD AUTO: 10.2 FL (ref 8.9–12.7)
POTASSIUM SERPL-SCNC: 4.2 MMOL/L (ref 3.5–5.3)
PROT SERPL-MCNC: 7.5 G/DL (ref 6.4–8.4)
RBC # BLD AUTO: 4.59 MILLION/UL (ref 3.88–5.62)
RBC MORPH BLD: NORMAL
SMUDGE CELLS BLD QL SMEAR: PRESENT
SODIUM SERPL-SCNC: 129 MMOL/L (ref 135–147)
VARIANT LYMPHS # BLD AUTO: 11 %
WBC # BLD AUTO: 20.55 THOUSAND/UL (ref 4.31–10.16)

## 2023-06-20 PROCEDURE — 85027 COMPLETE CBC AUTOMATED: CPT

## 2023-06-20 PROCEDURE — 85007 BL SMEAR W/DIFF WBC COUNT: CPT

## 2023-06-20 PROCEDURE — 36415 COLL VENOUS BLD VENIPUNCTURE: CPT

## 2023-06-20 PROCEDURE — 80053 COMPREHEN METABOLIC PANEL: CPT

## 2023-06-20 NOTE — PROGRESS NOTES
Received call from [address] [facility]  Pt reported to Ragena Angelucci that he needed blood work drawn, but did not know which labs he needs  Chart review done  Advised that CBC and CMP are ordered and need to be drawn prior to is appointment tomorrow 6/21 with oncology  Ragena Angelucci will draw labs today at home visit

## 2023-06-20 NOTE — TELEPHONE ENCOUNTER
I spoke with the patient in regards to his lab work that needs to be completed prior to his appt on 6/21/23 at 12:40pm  Patient states his visiting nurse was supposed to come yesterday and didn't  Informed patient that I can call the visiting nurses to come out and draw his labs  Patient provided nursing number [phone]  Informed patient I will give them a call  Patient understood  I spoke with Gracy Amaya from [place] [address] in regards to the patient being set up for a lab draw  I asked if I was able to fax over the lab scripts  Gracy Amaya provided fax [phone] and states she will try to have the patient set up asa

## 2023-06-20 NOTE — TELEPHONE ENCOUNTER
Patient Call    Who are you speaking with? Patient    If it is not the patient, are they listed on an active communication consent form? N/A   What is the reason for this call? Patient calling to speak with [facility]  She left him a message regarding his labs  I informed him what the message was and he still requested to speak with her    Does this require a call back? yes   If a call back is required, please list best call back number [phone]   If a call back is required, advise that a message will be forwarded to their care team and someone will return their call as soon as possible  Did you relay this information to the patient?  yes

## 2023-06-21 ENCOUNTER — TELEPHONE (OUTPATIENT)
Dept: HEMATOLOGY ONCOLOGY | Facility: CLINIC | Age: 63
End: 2023-06-21

## 2023-06-21 ENCOUNTER — OFFICE VISIT (OUTPATIENT)
Dept: HEMATOLOGY ONCOLOGY | Facility: MEDICAL CENTER | Age: 63
End: 2023-06-21
Payer: COMMERCIAL

## 2023-06-21 VITALS
WEIGHT: 252.8 LBS | TEMPERATURE: 97.9 F | BODY MASS INDEX: 35.39 KG/M2 | RESPIRATION RATE: 20 BRPM | OXYGEN SATURATION: 99 % | HEART RATE: 61 BPM | DIASTOLIC BLOOD PRESSURE: 72 MMHG | HEIGHT: 71 IN | SYSTOLIC BLOOD PRESSURE: 130 MMHG

## 2023-06-21 DIAGNOSIS — D69.6 PLATELETS DECREASED (HCC): ICD-10-CM

## 2023-06-21 DIAGNOSIS — C91.11 CHRONIC LYMPHOCYTIC LEUKEMIA OF B-CELL TYPE IN REMISSION (HCC): ICD-10-CM

## 2023-06-21 DIAGNOSIS — J42 CHRONIC BRONCHITIS, UNSPECIFIED CHRONIC BRONCHITIS TYPE (HCC): Primary | ICD-10-CM

## 2023-06-21 PROCEDURE — 99214 OFFICE O/P EST MOD 30 MIN: CPT | Performed by: INTERNAL MEDICINE

## 2023-06-21 NOTE — TELEPHONE ENCOUNTER
Patient Call    Who are you speaking with? Enio Alfonso    If it is not the patient, are they listed on an active communication consent form? N/A   What is the reason for this call? Antonio Checo called to confirm pt was done with appt and ready to be picked up  Per  at [address] I told her he just left the building and got picked up   Does this require a call back? N/A   If a call back is required, please list best call back number n/a   If a call back is required, advise that a message will be forwarded to their care team and someone will return their call as soon as possible  Did you relay this information to the patient?  N/A
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
## 2023-06-23 ENCOUNTER — TELEPHONE (OUTPATIENT)
Age: 63
End: 2023-06-23

## 2023-06-23 NOTE — TELEPHONE ENCOUNTER
Pt calling to reschedule EGD from 2/22/23  Pt asking if a colonoscopy is indicated at this time- would prefer to get both done at same time  Please contact pt to discuss  Pt requesting a call back Monday as he will not be available today

## 2023-06-27 ENCOUNTER — TELEPHONE (OUTPATIENT)
Dept: GASTROENTEROLOGY | Facility: CLINIC | Age: 63
End: 2023-06-27

## 2023-06-27 NOTE — TELEPHONE ENCOUNTER
Called pt, however, [phone] is not in service  Will check pt's chart to see if another phone number can call him at

## 2023-06-27 NOTE — TELEPHONE ENCOUNTER
Scheduled date of EGD(as of today): 8/9/23  Physician performing EGD: Dr Baldomero Garcia  Location of EGD: [facility]  Instructions reviewed with patient by: via my chart ls   Clearances: Will fax Cardiac/Eliquis clearance to Dr Beatriz Hernandez 1 mo prior to procedure  DIABETIC              When speaking to pt and informing of colon recall date, he informed not having any problems at this time  I informed him he would receive a letter in the mail when gets closer to schedule that

## 2023-06-27 NOTE — TELEPHONE ENCOUNTER
Colon is not indicated until 2028   Will call pt to inform and see if he is having any problems   If so will forward message to Dr Claudine Ferrer LPN routed conversation to Micki Rojas Dr Clerical 4 days ago     Blanca Winston, RN routed conversation to [facility] 4 days ago     Blanca Winston, RN 4 days ago       Pt calling to reschedule EGD from 2/22/23  Pt asking if a colonoscopy is indicated at this time- would prefer to get both done at same time  Please contact pt to discuss  Pt requesting a call back Monday as he will not be available today

## 2023-06-29 ENCOUNTER — TELEPHONE (OUTPATIENT)
Dept: FAMILY MEDICINE CLINIC | Facility: CLINIC | Age: 63
End: 2023-06-29

## 2023-06-29 NOTE — TELEPHONE ENCOUNTER
Received form from [facility]#[id number]  Scanned copy into encounter  Placed in white clinical folder

## 2023-07-05 ENCOUNTER — TELEMEDICINE (OUTPATIENT)
Dept: PAIN MEDICINE | Facility: CLINIC | Age: 63
End: 2023-07-05
Payer: COMMERCIAL

## 2023-07-05 ENCOUNTER — TELEPHONE (OUTPATIENT)
Dept: PAIN MEDICINE | Facility: CLINIC | Age: 63
End: 2023-07-05

## 2023-07-05 DIAGNOSIS — M54.42 CHRONIC BILATERAL LOW BACK PAIN WITH LEFT-SIDED SCIATICA: ICD-10-CM

## 2023-07-05 DIAGNOSIS — G62.9 PERIPHERAL POLYNEUROPATHY: ICD-10-CM

## 2023-07-05 DIAGNOSIS — G89.4 CHRONIC PAIN SYNDROME: Primary | ICD-10-CM

## 2023-07-05 DIAGNOSIS — G89.29 CHRONIC BILATERAL LOW BACK PAIN WITH LEFT-SIDED SCIATICA: ICD-10-CM

## 2023-07-05 DIAGNOSIS — M79.18 MYOFASCIAL PAIN SYNDROME: ICD-10-CM

## 2023-07-05 PROCEDURE — 99213 OFFICE O/P EST LOW 20 MIN: CPT

## 2023-07-05 RX ORDER — TIZANIDINE 4 MG/1
4 TABLET ORAL EVERY 8 HOURS PRN
Qty: 75 TABLET | Refills: 2 | Status: SHIPPED | OUTPATIENT
Start: 2023-07-05

## 2023-07-05 RX ORDER — GABAPENTIN 600 MG/1
600 TABLET ORAL 4 TIMES DAILY
Qty: 120 TABLET | Refills: 2 | Status: SHIPPED | OUTPATIENT
Start: 2023-07-05 | End: 2023-07-13 | Stop reason: SDUPTHER

## 2023-07-05 NOTE — PROGRESS NOTES
Virtual Brief Visit    This Visit is being completed by telephone. The Patient is located at Home and in the following state in which I hold an active license [place]    The patient was identified by name and date of birth. Lis Collazo was informed that this is a telemedicine visit and that the visit is being conducted through Telephone. My office door was closed. The patient was notified the following individuals were present in the room to medical assistants. He acknowledged consent and understanding of privacy and security. The patient has agreed to participate and understands they can discontinue the visit at any time. Patient is aware this is a billable service. Assessment/Plan:    Patient reports worsening bilateral back pain that radiates down his left posterior leg. Patient was scheduled for an epidural steroid injection however patient states that he has tried it before in the past and it only provided 1 day pain relief and does not feel it is worth all the pain to go through it again. Patient is currently using gabapentin 600 mg 3 times daily patient denies any side effects using this medication and feels that it is helpful but would like to increase this dosage. I educated the patient that gabapentin 600 mg 4 times daily would be considered a high dose gabapentin. I reeducated the patient on the potential side effects of gabapentin including drowsiness and dizziness as well as brain fog and forgetfulness. Patient continues to use tizanidine 4 mg tablet up to 3 times a day as needed for pain/muscle spasms. He states he primarily uses this at bedtime states that it does cause some drowsiness. Gabapentin 600 mg 4 times daily and tizanidine 4 mg tablet E-prescribed to the patient's pharmacy. Patient reports he is no longer getting visiting nursing however does have a home health aide.   Patient is not interested in additional physical therapy at this time he states that he does do exercises at home and uses a rollator to get around his apartment. At today's visit patient states that their pain symptoms are the same and worse in some ways with a pain score of 7-10/10 on the verbal numeric pain scale. The patient's pain is worse in the morning, evening, and at night. The patient's pain is intermittent in nature. And the quality of the patient's pain is described as  burning, sharp, throbbing, cramping, pressure-like, shooting, numbness, and pins-and-needles. .  The patient's pain is located in the bilateral low back and radiating down the left posterior leg. Patient states the amount of pain relief he is now obtaining from his current pain relievers is enough to make a difference in his life by reducing his pain symptoms by 40%. Patient is currently using gabapentin and tizanidine which the patient states that tizanidine does cause some drowsiness but denies any side effects using gabapentin. Problem List Items Addressed This Visit        Nervous and Auditory    Peripheral neuropathy    Relevant Medications    gabapentin (Neurontin) 600 MG tablet       Other    Chronic low back pain    Chronic pain syndrome - Primary   Other Visit Diagnoses     Myofascial pain syndrome        Relevant Medications    tiZANidine (ZANAFLEX) 4 mg tablet            Recent Visits  Date Type Provider Dept   06/29/23 Telephone Tuesday [facility]   Showing recent visits within past 7 days and meeting all other requirements  Today's Visits  Date Type Provider Dept   07/05/23 Telephone [facility]   07/05/23 Telemedicine [facility]   Showing today's visits and meeting all other requirements  Future Appointments  No visits were found meeting these conditions.   Showing future appointments within next 150 days and meeting all other requirements         Visit Time  Total Visit Duration: 9 minutes

## 2023-07-05 NOTE — TELEPHONE ENCOUNTER
Caller: Alonzo    Doctor: Sharif Thompson    Reason for call: patient states Virtual     Call back#: [phone]

## 2023-07-05 NOTE — PATIENT INSTRUCTIONS
Gabapentin (By mouth)   Gabapentin (bert-a-PEN-tin)  Treats seizures and pain caused by shingles. Brand Name(s): FusePaq Fanatrex, Neurontin   There may be other brand names for this medicine. When This Medicine Should Not Be Used: This medicine is not right for everyone. Do not use it if you had an allergic reaction to gabapentin. How to Use This Medicine:   Capsule, Liquid, Tablet  Take your medicine as directed. Your dose may need to be changed several times to find what works best for you. If you have epilepsy, do not allow more than 12 hours to pass between doses. Capsule: Swallow the capsule whole with plenty of water. Do not open, crush, or chew it. Gralise® tablet: Swallow the tablet whole . Do not crush, break, or chew it. Neurontin® tablet: If you break a tablet into 2 pieces, use the second half as your next dose. Do not use the half-tablet if the whole tablet has been cut or broken after 28 days. Oral liquid: Measure the oral liquid medicine with a marked measuring spoon, oral syringe, or medicine cup. This medicine should come with a Medication Guide. Ask your pharmacist for a copy if you do not have one. Missed dose: Take a dose as soon as you remember. If it is almost time for your next dose, wait until then and take a regular dose. Do not take extra medicine to make up for a missed dose. Store the medicine in a closed container at room temperature, away from heat, moisture, and direct light. Store the Neurontin® oral liquid in the refrigerator. Do not freeze. Drugs and Foods to Avoid:   Ask your doctor or pharmacist before using any other medicine, including over-the-counter medicines, vitamins, and herbal products. Some medicines can affect how gabapentin works. Tell your doctor if you also using hydrocodone or morphine. If you take an antacid, wait at least 2 hours before you take gabapentin. Do not drink alcohol while you are using this medicine.   Tell your doctor if you use anything else that makes you sleepy. Some examples are allergy medicine, narcotic pain medicine, and alcohol. Tell your doctor if you are also using lorazepam, oxycodone, or zolpidem. Warnings While Using This Medicine:   Tell your doctor if you are pregnant or breastfeeding, or if you have kidney problems (including patients receiving dialysis) or lung problems. Tell your doctor if you have a history of depression or mental health problems. This medicine may cause the following problems:  Drug reaction with eosinophilia and systemic symptoms (DRESS) or multiorgan hypersensitivity, which may damage the liver, kidney, blood, heart, or muscles  Changes in mood or behavior, including suicidal thoughts or behavior  Respiratory depression (serious breathing problem that can be life-threatening), when used with narcotic pain medicines  Do not stop using this medicine suddenly. Your doctor will need to slowly decrease your dose before you stop it completely. This medicine may make you dizzy or drowsy. Do not drive or do anything else that could be dangerous until you know how this medicine affects you. Tell any doctor or dentist who treats you that you are using this medicine. This medicine may affect certain medical test results. Your doctor will check your progress and the effects of this medicine at regular visits. Keep all appointments. Keep all medicine out of the reach of children. Never share your medicine with anyone. Possible Side Effects While Using This Medicine:   Call your doctor right away if you notice any of these side effects:   Allergic reaction: Itching or hives, swelling in your face or hands, swelling or tingling in your mouth or throat, chest tightness, trouble breathing  Behavior problems, aggression, restlessness, trouble concentrating, moodiness (especially in children)  Blistering, peeling, red skin rash  Blue lips, fingernails, or skin, chest pain, fast heartbeat, trouble breathing  Change in how much or how often you urinate, bloody or cloudy urine  Dark urine or pale stools, nausea, vomiting, loss of appetite, stomach pain, yellow skin or eyes  Fever, chills, cough, sore throat, body aches  Problems with coordination, shakiness, unsteadiness, unusual eye movement  Rapid weight gain, swelling in your hands, ankles, or feet  Rash, swollen or tender glands in the neck, armpit, or groin  Unusual moods or behaviors, thoughts of hurting yourself, feeling depressed  If you notice these less serious side effects, talk with your doctor:   Dizziness, drowsiness, sleepiness, tiredness  If you notice other side effects that you think are caused by this medicine, tell your doctor. Call your doctor for medical advice about side effects. You may report side effects to FDA at 7-929-FDA-8420    © Copyright [employer] 2022 Information is for End User's use only and may not be sold, redistributed or otherwise used for commercial purposes. The above information is an  only. It is not intended as medical advice for individual conditions or treatments. Talk to your doctor, nurse or pharmacist before following any medical regimen to see if it is safe and effective for you.

## 2023-07-07 NOTE — TELEPHONE ENCOUNTER
Dr. Johnson Precise! Our mutual patient is scheduled for procedure:   EGD with Dr. Felisha Holland at [facility]. Please advise if pt is cleared for procedure and if and for how   Long may hold Eliquis. Thank you! On: __8__/_  9  _/_ 21   _      With:  ___Hermes______    He/She is taking the following blood thinner:   Eliquis       Can this be stopped ___2___ days prior to the procedure?       Physician Approving clearance: ________________________

## 2023-07-10 PROBLEM — J18.9 PNEUMONIA: Status: RESOLVED | Noted: 2023-05-05 | Resolved: 2023-07-10

## 2023-07-12 ENCOUNTER — TELEPHONE (OUTPATIENT)
Dept: PAIN MEDICINE | Facility: CLINIC | Age: 63
End: 2023-07-12

## 2023-07-12 DIAGNOSIS — Z79.4 TYPE 2 DIABETES MELLITUS WITH COMPLICATION, WITH LONG-TERM CURRENT USE OF INSULIN (HCC): ICD-10-CM

## 2023-07-12 DIAGNOSIS — E11.65 UNCONTROLLED TYPE 2 DIABETES MELLITUS WITH HYPERGLYCEMIA (HCC): Primary | ICD-10-CM

## 2023-07-12 DIAGNOSIS — G62.9 PERIPHERAL POLYNEUROPATHY: ICD-10-CM

## 2023-07-12 DIAGNOSIS — E11.42 DIABETIC POLYNEUROPATHY ASSOCIATED WITH TYPE 2 DIABETES MELLITUS (HCC): ICD-10-CM

## 2023-07-12 DIAGNOSIS — E11.8 TYPE 2 DIABETES MELLITUS WITH COMPLICATION, WITH LONG-TERM CURRENT USE OF INSULIN (HCC): ICD-10-CM

## 2023-07-12 NOTE — TELEPHONE ENCOUNTER
Caller: Melia Brand     Doctor: Esperanza Padron     Reason for call: Patient returning call     Call back#: [phone] Patient seen and examined by me.  I have discussed my recommendation with the PA which are outlined above.  Will sign off.

## 2023-07-12 NOTE — TELEPHONE ENCOUNTER
Caller: pt    Doctor: Lola Cervantes    Reason for call: pt is having  Lot of pain with his neuropathy.     Call back#: [phone]

## 2023-07-12 NOTE — TELEPHONE ENCOUNTER
I see your glucose numbers have increased greatly over the last year this could be contributing to your neuropathy. I would typically offer steroids however that would increase your blood sugars. I will forward to Dr. Aquilino Hand to get his input.

## 2023-07-12 NOTE — TELEPHONE ENCOUNTER
Please review below    S/W pt who was seen last week  States did increase his Gabapentin to 600mg QID, but states neuropathy sx are getting worse  Mainly in his hands  States having trouble sleeping at night due to pain. Is also taking Tizanidine Q8 and using Bengay on his hands which is not very helpful    Advised he may need to give the Cora more time to become effective.   Pt asking for other recs until it kicks in    Please advise

## 2023-07-13 RX ORDER — GABAPENTIN 800 MG/1
800 TABLET ORAL 4 TIMES DAILY
Qty: 120 TABLET | Refills: 1 | Status: SHIPPED | OUTPATIENT
Start: 2023-07-13

## 2023-07-13 NOTE — TELEPHONE ENCOUNTER
Gabapentin 800mg 4 times a day sent to pharmacy and referral placed for Dr Annetta Garcia Endocrin[facility]

## 2023-07-13 NOTE — TELEPHONE ENCOUNTER
S/w pt, advised of Dr. Karie vicente. Pt verbalized understanding and agreeable to plan. Requested updated gabapentin script be sent to pharmacy on file. RN will cb with contact info once endocrinology referral ordered.

## 2023-07-13 NOTE — TELEPHONE ENCOUNTER
S/w pt, advised of the same, contact info for referral provided. Pt verbalized understanding and appreciation. Advised to cb with any further questions or concerns.

## 2023-07-17 ENCOUNTER — TELEPHONE (OUTPATIENT)
Dept: FAMILY MEDICINE CLINIC | Facility: CLINIC | Age: 63
End: 2023-07-17

## 2023-07-17 NOTE — TELEPHONE ENCOUNTER
Hi Tuesday-  Patient is looking to schedule a Home Visit as he has been experiencing increased Neuropathy pain for the past week or so. I can assist with coordinating the scheduling if needed.

## 2023-07-24 ENCOUNTER — TELEPHONE (OUTPATIENT)
Dept: FAMILY MEDICINE CLINIC | Facility: CLINIC | Age: 63
End: 2023-07-24

## 2023-07-24 DIAGNOSIS — L72.9 INFECTED CYST OF SKIN: Primary | ICD-10-CM

## 2023-07-24 DIAGNOSIS — L08.9 INFECTED CYST OF SKIN: Primary | ICD-10-CM

## 2023-07-24 RX ORDER — AMPICILLIN 500 MG/1
500 CAPSULE ORAL 4 TIMES DAILY
Qty: 28 CAPSULE | Refills: 0 | Status: SHIPPED | OUTPATIENT
Start: 2023-07-24 | End: 2023-07-28

## 2023-07-24 NOTE — TELEPHONE ENCOUNTER
Received call on clinical line:     Hi, this is Alireza Kerns. My YOB: 1940 and if I pronounced right Tuesday [place]. It's who I'm seeing now come over to my apartment. Anyhow, I just messaged her. I have a black and blue contreras. I call it and it's a little. In other words, I need to. I would like to have her look at it and get back to me as soon as she can. I don't think it's nothing, but I'd like to know what she says. Anyhow, my phone number is [phone]. Again [phone]. Thank you. Have a good day. Bye. Bye. Forwarding to PCP for further evaluation.

## 2023-07-24 NOTE — PROGRESS NOTES
Patient reported and sent a picture of infected cyst of skin on right abdomen which he previously had. He does not inject insulin in his abdomen. There is no drainage. Abt ordered and educated to apply warm compresses every day 4 times a day.

## 2023-07-27 ENCOUNTER — OFFICE VISIT (OUTPATIENT)
Dept: PULMONOLOGY | Facility: MEDICAL CENTER | Age: 63
End: 2023-07-27
Payer: COMMERCIAL

## 2023-07-27 ENCOUNTER — TELEPHONE (OUTPATIENT)
Dept: FAMILY MEDICINE CLINIC | Facility: CLINIC | Age: 63
End: 2023-07-27

## 2023-07-27 VITALS
SYSTOLIC BLOOD PRESSURE: 134 MMHG | DIASTOLIC BLOOD PRESSURE: 86 MMHG | HEART RATE: 79 BPM | WEIGHT: 245 LBS | OXYGEN SATURATION: 97 % | HEIGHT: 71 IN | BODY MASS INDEX: 34.3 KG/M2

## 2023-07-27 DIAGNOSIS — R05.1 ACUTE COUGH: ICD-10-CM

## 2023-07-27 DIAGNOSIS — G47.33 OBSTRUCTIVE SLEEP APNEA: ICD-10-CM

## 2023-07-27 DIAGNOSIS — J96.11 CHRONIC RESPIRATORY FAILURE WITH HYPOXIA (HCC): ICD-10-CM

## 2023-07-27 DIAGNOSIS — E11.65 UNCONTROLLED TYPE 2 DIABETES MELLITUS WITH HYPERGLYCEMIA (HCC): Primary | ICD-10-CM

## 2023-07-27 DIAGNOSIS — E66.2: ICD-10-CM

## 2023-07-27 DIAGNOSIS — J41.0 SIMPLE CHRONIC BRONCHITIS (HCC): Primary | Chronic | ICD-10-CM

## 2023-07-27 PROCEDURE — 99214 OFFICE O/P EST MOD 30 MIN: CPT | Performed by: NURSE PRACTITIONER

## 2023-07-27 PROCEDURE — 94618 PULMONARY STRESS TESTING: CPT | Performed by: NURSE PRACTITIONER

## 2023-07-27 RX ORDER — BLOOD SUGAR DIAGNOSTIC
STRIP MISCELLANEOUS
Qty: 25 EACH | Refills: 6 | Status: SHIPPED | OUTPATIENT
Start: 2023-07-27 | End: 2023-08-17 | Stop reason: SDUPTHER

## 2023-07-27 RX ORDER — BENZONATATE 100 MG/1
200 CAPSULE ORAL 3 TIMES DAILY PRN
Qty: 90 CAPSULE | Refills: 1 | Status: SHIPPED | OUTPATIENT
Start: 2023-07-27 | End: 2023-07-28 | Stop reason: SDUPTHER

## 2023-07-27 RX ORDER — GUAIFENESIN/DEXTROMETHORPHAN 100-10MG/5
5 SYRUP ORAL 3 TIMES DAILY PRN
Qty: 237 ML | Refills: 1 | Status: SHIPPED | OUTPATIENT
Start: 2023-07-27

## 2023-07-27 RX ORDER — ALBUTEROL SULFATE 90 UG/1
2 AEROSOL, METERED RESPIRATORY (INHALATION) EVERY 6 HOURS PRN
Qty: 18 G | Refills: 5 | Status: SHIPPED | OUTPATIENT
Start: 2023-07-27

## 2023-07-27 NOTE — ASSESSMENT & PLAN NOTE
He will continue with Trelegy and albuterol as needed. He is aware of proper use and technique. I also refilled his Tessalon today and gave him a prescription for Robitussin-DM at his request.  Instructed on proper use.

## 2023-07-27 NOTE — ASSESSMENT & PLAN NOTE
We will continue with nightly BiPAP use with 3 L of oxygen. I did order an overnight pulse ox to evaluate for any desaturation. He would like to requalify for equipment sent through a different company.
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
## 2023-07-28 DIAGNOSIS — J20.9 ACUTE BRONCHITIS, UNSPECIFIED ORGANISM: Primary | ICD-10-CM

## 2023-07-28 DIAGNOSIS — R05.1 ACUTE COUGH: ICD-10-CM

## 2023-07-28 RX ORDER — PREDNISONE 20 MG/1
40 TABLET ORAL DAILY
Qty: 10 TABLET | Refills: 0 | Status: SHIPPED | OUTPATIENT
Start: 2023-07-28 | End: 2023-08-02

## 2023-07-28 RX ORDER — CEPHALEXIN 250 MG/1
500 CAPSULE ORAL EVERY 6 HOURS SCHEDULED
Qty: 42 CAPSULE | Refills: 0 | Status: SHIPPED | OUTPATIENT
Start: 2023-07-28 | End: 2023-08-04

## 2023-07-28 RX ORDER — BENZONATATE 100 MG/1
200 CAPSULE ORAL 3 TIMES DAILY PRN
Qty: 90 CAPSULE | Refills: 1 | Status: SHIPPED | OUTPATIENT
Start: 2023-07-28

## 2023-07-29 LAB
DME PARACHUTE DELIVERY DATE REQUESTED: NORMAL
DME PARACHUTE DELIVERY NOTE: NORMAL
DME PARACHUTE ITEM DESCRIPTION: NORMAL
DME PARACHUTE ORDER STATUS: NORMAL
DME PARACHUTE SUPPLIER NAME: NORMAL
DME PARACHUTE SUPPLIER PHONE: NORMAL

## 2023-07-31 NOTE — TELEPHONE ENCOUNTER
Completed form has been faxed to the number listed below copy has been attached to this encounter.     [phone]

## 2023-08-03 LAB
DME PARACHUTE DELIVERY DATE ACTUAL: NORMAL
DME PARACHUTE DELIVERY DATE REQUESTED: NORMAL
DME PARACHUTE DELIVERY NOTE: NORMAL
DME PARACHUTE ITEM DESCRIPTION: NORMAL
DME PARACHUTE ORDER STATUS: NORMAL
DME PARACHUTE SUPPLIER NAME: NORMAL
DME PARACHUTE SUPPLIER PHONE: NORMAL

## 2023-08-09 ENCOUNTER — HOSPITAL ENCOUNTER (OUTPATIENT)
Dept: GASTROENTEROLOGY | Facility: AMBULARY SURGERY CENTER | Age: 63
Setting detail: OUTPATIENT SURGERY
Discharge: HOME | End: 2023-08-09
Attending: INTERNAL MEDICINE

## 2023-08-09 VITALS
SYSTOLIC BLOOD PRESSURE: 142 MMHG | TEMPERATURE: 97 F | RESPIRATION RATE: 16 BRPM | DIASTOLIC BLOOD PRESSURE: 73 MMHG | HEART RATE: 71 BPM | OXYGEN SATURATION: 96 %

## 2023-08-09 DIAGNOSIS — E11.43 GASTROPARESIS DUE TO DM (HCC): ICD-10-CM

## 2023-08-09 DIAGNOSIS — K31.84 GASTROPARESIS DUE TO DM (HCC): ICD-10-CM

## 2023-08-09 DIAGNOSIS — K21.9 GASTROESOPHAGEAL REFLUX DISEASE WITHOUT ESOPHAGITIS: ICD-10-CM

## 2023-08-10 ENCOUNTER — TELEPHONE (OUTPATIENT)
Dept: GASTROENTEROLOGY | Facility: CLINIC | Age: 63
End: 2023-08-10

## 2023-08-10 NOTE — TELEPHONE ENCOUNTER
----- Message from Sussy Nj MD sent at 8/9/2023 11:46 AM EDT -----  Regarding: schedule for egd and colonoscopy at [facility]  Patient came today for egd but ate peanut butter jelly sandwich in the morning so anesthesia canceled the patient. He want to schedule for EGD in 1 week and he is due for colonoscopy so he want both procedure scheduled to get her at [facility] surgical center in the morning.   Please call him tomorrow and reschedule for procedure, for colonoscopy will need gallon GoLytely bowel prep

## 2023-08-11 NOTE — TELEPHONE ENCOUNTER
Orders created for Egd/Colon. I lmom for pt to please call back to schedule the EGD and colonoscopy with Dr. Janet Geronimo. Will call pt again in one week if do not hear back from him.

## 2023-08-14 ENCOUNTER — TELEPHONE (OUTPATIENT)
Dept: PULMONOLOGY | Facility: CLINIC | Age: 63
End: 2023-08-14

## 2023-08-14 DIAGNOSIS — E78.5 DYSLIPIDEMIA: ICD-10-CM

## 2023-08-14 RX ORDER — ICOSAPENT ETHYL 1000 MG/1
2 CAPSULE ORAL 2 TIMES DAILY
Qty: 120 CAPSULE | Refills: 6 | Status: SHIPPED | OUTPATIENT
Start: 2023-08-14

## 2023-08-15 ENCOUNTER — TELEPHONE (OUTPATIENT)
Dept: FAMILY MEDICINE CLINIC | Facility: CLINIC | Age: 63
End: 2023-08-15

## 2023-08-17 DIAGNOSIS — Z79.4 TYPE 2 DIABETES MELLITUS WITH HYPERGLYCEMIA, WITH LONG-TERM CURRENT USE OF INSULIN (HCC): ICD-10-CM

## 2023-08-17 DIAGNOSIS — Z79.4 TYPE 2 DIABETES MELLITUS WITH COMPLICATION, WITH LONG-TERM CURRENT USE OF INSULIN (HCC): ICD-10-CM

## 2023-08-17 DIAGNOSIS — E11.65 TYPE 2 DIABETES MELLITUS WITH HYPERGLYCEMIA, WITH LONG-TERM CURRENT USE OF INSULIN (HCC): ICD-10-CM

## 2023-08-17 DIAGNOSIS — F99 PSYCHIATRIC DISORDER: ICD-10-CM

## 2023-08-17 DIAGNOSIS — E11.8 TYPE 2 DIABETES MELLITUS WITH COMPLICATION, WITH LONG-TERM CURRENT USE OF INSULIN (HCC): ICD-10-CM

## 2023-08-17 DIAGNOSIS — E11.65 UNCONTROLLED TYPE 2 DIABETES MELLITUS WITH HYPERGLYCEMIA (HCC): ICD-10-CM

## 2023-08-17 RX ORDER — QUETIAPINE FUMARATE 300 MG/1
300 TABLET, FILM COATED ORAL
Qty: 30 TABLET | Refills: 3 | Status: SHIPPED | OUTPATIENT
Start: 2023-08-17 | End: 2023-09-13 | Stop reason: SDUPTHER

## 2023-08-17 RX ORDER — UBIQUINOL 100 MG
CAPSULE ORAL 4 TIMES DAILY
Qty: 100 EACH | Refills: 6 | Status: SHIPPED | OUTPATIENT
Start: 2023-08-17

## 2023-08-17 RX ORDER — UBIQUINOL 100 MG
CAPSULE ORAL
Qty: 100 EACH | Refills: 0 | Status: CANCELLED | OUTPATIENT
Start: 2023-08-17

## 2023-08-17 RX ORDER — BLOOD-GLUCOSE METER
EACH MISCELLANEOUS
COMMUNITY
Start: 2023-07-28

## 2023-08-17 RX ORDER — BLOOD SUGAR DIAGNOSTIC
STRIP MISCELLANEOUS
Qty: 25 EACH | Refills: 6 | Status: SHIPPED | OUTPATIENT
Start: 2023-08-17

## 2023-08-18 ENCOUNTER — TELEPHONE (OUTPATIENT)
Dept: FAMILY MEDICINE CLINIC | Facility: CLINIC | Age: 63
End: 2023-08-18

## 2023-08-18 DIAGNOSIS — R19.4 CHANGE IN BOWEL HABITS: Primary | ICD-10-CM

## 2023-08-18 NOTE — TELEPHONE ENCOUNTER
Completed form has been faxed to the number listed below copy has been attached to this encounter.       [phone]

## 2023-08-18 NOTE — TELEPHONE ENCOUNTER
Scheduled date of EGD/colonoscopy (as of today): 9/5/23  Physician performing EGD/colonoscopy: Dr. Dinorah Vaughn  Location of EGD/colonoscopy: [facility]  Desired bowel prep reviewed with patient: nicole emailed   Instructions reviewed with patient by:yessi  Clearances: Will fax Eliquis clearance one month prior to procedure to MANAS Tuesday Taryn - PCP.

## 2023-08-18 NOTE — TELEPHONE ENCOUNTER
Faxed Eliquis clearance to pcp, Tuesjacy MALAGON [phone]. Will call their office in one week to make sure that clearance request was received [phone].

## 2023-08-19 NOTE — TELEPHONE ENCOUNTER
Attempted to call the patient again and phone with constant busy signal 
Attempted to call the patient and phone with busy signal  Will attempt again later 
Attempted to reach pt  Unable to leave a message received a constant busy signal  Attempted to contact pt's friend Kathie Monae as per SIENA  S/w Kathie Monae who stated that she contacted the pt 2 days ago and he has not responded to her yet  Kathie Monae will attempt to contact pt again today     Pt scheduled for procedure 4/15
Attempted to reach pt  Unable to leave a message received a constant busy signal  Attempted to contact pt's friend Star Hardy as per SIENA  S/w Star Hardy and advised of same  Star Hardy will attempt to contact pt 
Attempted to reach pt multiple times, unable to leave a VM due to a constant busy signal     Supriya Buck who is listed as pt's emergency contact and on his release of information document at [phone] and advised that we are trying to reach pt if she could please have him call us to review his pre-procedure instructions  Dede Martinez verbalized understanding and will contact pt to advise of same 
Attempted to reach pt several times, busy signal each time  Will try again this afternoon, and will contact emergency contact on file if no answer 
Attempted to reach pt, his VM states he is having phone issues  Contacted emergency contact, per Bret WREN, asked her to have pt c/b  Left c/b# 
Attempted to reach pt, vm full  Unable to leave voicemail 
Awaiting pt's c/b
Aware  Thanks 
Eliquis hold was received  Please call pt with instructions  Fabrizio Spears DO  [address],     Mr Noemy Mckay may hold his Eliquis for 3 days prior to procedure     Thanks        
Ella Grayson 5 minutes ago (3:23 PM)            Patient called back stating there was someone who called his friend concerning his Eliquis  He is having some issues with his phone but please try to call back            Documentation
FYI  Call transferred to me, pt instructed to hold Eliquis starting today (confirmed pt did not take it today) and continue to hold through Friday for his procedure  RN informed pt Anni Henriquez will call him the night before with his arrival time  Pt said he got a new # and it was updated in his chart 
Hi Dr Nacho Santana,    Patient would like a prescription for Flexeril please   TY
Hi Dr Stephen Mariscal,  We had received permission from you for this mutual pt to hold his Eliquis (3 days prior) for his upcoming epidural steroid injection with our office  Since we received permission, pt was hospitalized  Would like to confirm with you, hold is still acceptable?   Thank you,  Erika Cruz RN
I increased his gabapentin today 
If we have tried multiple times, no need to keep trying  If he wants to proceed with the procedure, he can call back 
LMOM to c/b, c/b# and OH given       **AS does not want to start a new medication as her increased his Gabapentin today, will await results from increase
Patient said he doesn't have a phone 
Pt admitted to hospital 3/24 
Pt currently in ED, will await d/c
Pt inpatient still 3/28 
Pt still inpatient as of 3/29
Rec'd teams message from procedure  that pt called in to discuss insurance, phone # verified  Pt requested RN text pt and he will CB  Pt advised that we are unable to do so  Requested procedure  to notify triage if pt calls back in order to advised on Eliquis hold 
Scheduled patient for 4/15/22  Patient takes Eliquis 5mg-   [employer] sent to Dr Faith Castillo    Nothing to eat or drink 1 hour prior to procedure  Needs to arrange transportation  Proper clothing for procedure  No vaccines 2 weeks prior or after procedure  If ill or place on antibiotics, please call to reschedule    COVID card is needed  Pt will e-mail or bring with him  Please call with medication hold instruction when the order is received 
We have attempted to contact the patient numerous times to review Eliquis hold  His phone is constantly busy  What would you like us to do?
[FreeTextEntry1] : A/A/Ox3 good mood + slight psychomotor slowing slight difficulty finding words Good attention CN- normal no drift no dysmetria stable gait

## 2023-08-22 LAB
DME PARACHUTE DELIVERY DATE REQUESTED: NORMAL
DME PARACHUTE ITEM DESCRIPTION: NORMAL
DME PARACHUTE ORDER STATUS: NORMAL
DME PARACHUTE SUPPLIER NAME: NORMAL
DME PARACHUTE SUPPLIER PHONE: NORMAL

## 2023-08-22 NOTE — TELEPHONE ENCOUNTER
Patient is calling he said he does have some memory issues and forgot that he did complete the BLADE study on 7/28 after his appointment with [employer]. He just wants to make sure his equipment order does get processed through [facility] now instead.  Please advise

## 2023-08-23 DIAGNOSIS — I50.32 CHRONIC DIASTOLIC (CONGESTIVE) HEART FAILURE (HCC): ICD-10-CM

## 2023-08-23 DIAGNOSIS — E78.2 MIXED HYPERLIPIDEMIA: ICD-10-CM

## 2023-08-23 RX ORDER — ATORVASTATIN CALCIUM 40 MG/1
40 TABLET, FILM COATED ORAL
Qty: 90 TABLET | Refills: 0 | Status: SHIPPED | OUTPATIENT
Start: 2023-08-23 | End: 2023-09-22

## 2023-08-23 RX ORDER — SPIRONOLACTONE 25 MG/1
25 TABLET ORAL DAILY
Qty: 90 TABLET | Refills: 1 | Status: SHIPPED | OUTPATIENT
Start: 2023-08-23 | End: 2023-11-21

## 2023-08-24 ENCOUNTER — IN HOME VISIT (OUTPATIENT)
Dept: FAMILY MEDICINE CLINIC | Facility: CLINIC | Age: 63
End: 2023-08-24
Payer: COMMERCIAL

## 2023-08-24 VITALS
HEART RATE: 74 BPM | TEMPERATURE: 97.5 F | RESPIRATION RATE: 20 BRPM | SYSTOLIC BLOOD PRESSURE: 148 MMHG | OXYGEN SATURATION: 98 % | DIASTOLIC BLOOD PRESSURE: 80 MMHG

## 2023-08-24 DIAGNOSIS — J41.0 SIMPLE CHRONIC BRONCHITIS (HCC): Chronic | ICD-10-CM

## 2023-08-24 DIAGNOSIS — E11.8 TYPE 2 DIABETES MELLITUS WITH COMPLICATION, WITH LONG-TERM CURRENT USE OF INSULIN (HCC): ICD-10-CM

## 2023-08-24 DIAGNOSIS — M25.512 SEVERE PAIN OF LEFT SHOULDER: Primary | ICD-10-CM

## 2023-08-24 DIAGNOSIS — R52 SEVERE PAIN: ICD-10-CM

## 2023-08-24 DIAGNOSIS — Z79.4 TYPE 2 DIABETES MELLITUS WITH COMPLICATION, WITH LONG-TERM CURRENT USE OF INSULIN (HCC): ICD-10-CM

## 2023-08-24 PROCEDURE — 99349 HOME/RES VST EST MOD MDM 40: CPT | Performed by: NURSE PRACTITIONER

## 2023-08-24 RX ORDER — GUAIFENESIN/DEXTROMETHORPHAN 100-10MG/5
5 SYRUP ORAL 3 TIMES DAILY PRN
Qty: 237 ML | Refills: 1 | Status: SHIPPED | OUTPATIENT
Start: 2023-08-24 | End: 2023-09-03

## 2023-08-24 RX ORDER — INSULIN LISPRO 100 [IU]/ML
15 INJECTION, SOLUTION INTRAVENOUS; SUBCUTANEOUS
Qty: 15 ML | Refills: 3 | Status: SHIPPED | OUTPATIENT
Start: 2023-08-24 | End: 2023-09-23

## 2023-08-24 RX ORDER — TRAMADOL HYDROCHLORIDE 50 MG/1
50 TABLET ORAL EVERY 6 HOURS PRN
Qty: 120 TABLET | Refills: 1 | Status: SHIPPED | OUTPATIENT
Start: 2023-08-24 | End: 2023-09-23

## 2023-08-24 NOTE — PROGRESS NOTES
Name: Saumya Garcia      : 1960      MRN: [id number]  Encounter Provider: MANAS Hein  Encounter Date: 2023   Encounter department: [address]     1. Severe pain of left shoulder  -     traMADol (Ultram) 50 mg tablet; Take 1 tablet (50 mg total) by mouth every 6 (six) hours as needed for moderate pain    2. Simple chronic bronchitis (HCC)  -     dextromethorphan-guaiFENesin (ROBITUSSIN DM)  mg/5 mL syrup; Take 5 mL by mouth 3 (three) times a day as needed for cough or congestion for up to 10 days    3. Type 2 diabetes mellitus with complication, with long-term current use of insulin (HCC)  -     insulin lispro (HumaLOG KwikPen) 100 units/mL injection pen; Inject 15 Units under the skin 3 (three) times a day with meals    4. Severe pain  -     traMADol (Ultram) 50 mg tablet; Take 1 tablet (50 mg total) by mouth every 6 (six) hours as needed for moderate pain        Depression Screening and Follow-up Plan: Patient was screened for depression during today's encounter. They screened negative with a PHQ-2 score of 0. Subjective      Patient seen at home today due to c/o fatigue and pain/tenderness to entire left side. He takes 3200 mg of gabapentin for his diabetic neuropathy. The pain he describes on his left side prohibits him from lifting left arm completely, reduces his strength but does not effect his walking. His head and neck are only tender to touch on the outside. He has right side chest pain when he coughs. This is long standing due to an old muscle strain at that site. He has a hacking cough a present. Otherwise he has no edema and his lungs are clear. He was recently on antibiotics and cough syrup for infection upper airway. He has lost 5 lbs. He has leukemia. He is home bound due to use of 02 continuously and inability to get to appointments due to fatigue and pain. I tested him for covid.   Will continue cough syrup and start tramadol for pain. He is diabetic and doing BG manually at present time. He sends his BG to endocrinology. He will call if conditions worsen. >40 minutes spent with Alonzo on detailed history, physical exam and assessment, planning, diagnosing and education. Review of Systems   Constitutional: Positive for fatigue. HENT: Negative. Eyes: Negative. Respiratory: Positive for cough. Cardiovascular: Negative. Gastrointestinal: Negative. Endocrine: Negative. Genitourinary: Negative. Musculoskeletal: Positive for arthralgias. Skin: Negative. Allergic/Immunologic: Negative. Neurological: Negative. Hematological: Negative. Psychiatric/Behavioral: Negative.         Current Outpatient Medications on File Prior to Visit   Medication Sig   • Alcohol Swabs (Alcohol Prep) 70 % PADS Apply topically 4 (four) times a day As directed   • glucose blood (ReliOn True Metrix Test Strips) test strip Use as instructed   • metFORMIN (GLUCOPHAGE) 1000 MG tablet Take 1 tablet (1,000 mg total) by mouth 2 (two) times a day with meals   • acetaminophen (TYLENOL) 325 mg tablet Take 2 tablets (650 mg total) by mouth every 6 (six) hours as needed for mild pain, headaches or fever   • albuterol (2.5 mg/3 mL) 0.083 % nebulizer solution TAKE 1 VIAL (2.5 MG TOTAL) BY NEBULIZATION EVERY 6 (SIX) HOURS AS NEEDED FOR WHEEZING   • albuterol (Ventolin HFA) 90 mcg/act inhaler Inhale 2 puffs every 6 (six) hours as needed for wheezing   • apixaban (Eliquis) 5 mg Take 1 tablet (5 mg total) by mouth 2 (two) times a day   • Ascorbic Acid (VITAMIN C) 1000 MG tablet Take 1,000 mg by mouth daily   • aspirin 81 MG tablet Take 81 mg by mouth every morning     • atorvastatin (LIPITOR) 40 mg tablet Take 1 tablet (40 mg total) by mouth daily with dinner   • B-D ULTRAFINE III SHORT PEN 31G X 8 MM MISC Use as directed   • Blood Glucose Monitoring Suppl (OneTouch Verio Flex System) w/Device KIT    • busPIRone (BUSPAR) 10 mg tablet TAKE ONE TABLET BY MOUTH TWICE DAILY   • cholecalciferol (VITAMIN D3) 1,000 units tablet Take 1 tablet (1,000 Units total) by mouth daily   • Coral Choice Comfort EZ 33G X 4 MM MISC USE TO INJECT INSULIN 5 TIMES A DAY   • Continuous Blood Gluc Sensor (FreeStyle Sheba 14 Day Sensor) MISC Use 1 application every 14 (fourteen) days   • Continuous Blood Gluc Sensor (FreeStyle Sheba 14 Day Sensor) MISC Use as directed-   • digoxin (LANOXIN) 0.25 mg tablet Take 1 tablet (250 mcg total) by mouth daily   • fluticasone (FLONASE) 50 mcg/act nasal spray 1 spray into each nostril daily Do not start before May 12, 2023. • fluticasone-umeclidinium-vilanterol (TRELEGY) 100-62.5-25 MCG/INH inhaler Inhale 1 puff daily Rinse mouth after use.    • gabapentin (Neurontin) 800 mg tablet Take 1 tablet (800 mg total) by mouth 4 (four) times a day   • Icosapent Ethyl 1 g CAPS TAKE 2 CAPSULES TWICE A DAY   • Insulin Glargine Solostar (Lantus SoloStar) 100 UNIT/ML SOPN Inject 0.45 mL (45 Units total) under the skin 2 (two) times a day   • Insulin Pen Needle (Coral Choice Comfort EZ) 33G X 4 MM MISC USE TO INJECT INSULIN 5 TIMES A DAY   • lamoTRIgine (LaMICtal) 25 mg tablet 25 mg daily at bedtime   • Lancet Devices (Adjustable Lancing Device) MISC USE AS DIRECTED   • Lancets (onetouch ultrasoft) lancets test blood sugar 3 (three) times a day   • losartan (COZAAR) 50 mg tablet Take 1 tablet (50 mg total) by mouth daily   • magnesium (MAGTAB) 84 MG (7MEQ) TBCR Take 1 tablet (84 mg total) by mouth daily   • metoprolol succinate (TOPROL-XL) 200 MG 24 hr tablet Take 1 tablet (200 mg total) by mouth daily   • Multiple Vitamins-Minerals (CENTRUM SILVER 50+MEN PO) Take by mouth   • omeprazole (PriLOSEC) 20 mg delayed release capsule Take 1 capsule (20 mg total) by mouth daily   • polyethylene glycol (GOLYTELY) 4000 mL solution Take 4,000 mL by mouth once for 1 dose   • potassium chloride (K-DUR,KLOR-CON) 20 mEq tablet Take 1 tablet (20 mEq total) by mouth daily   • QUEtiapine (SEROquel) 100 mg tablet Take 1 tablet (100 mg total) by mouth daily at bedtime (Patient taking differently: Take 100 mg by mouth every morning)   • QUEtiapine (SEROquel) 300 mg tablet Take 1 tablet (300 mg total) by mouth daily at bedtime   • sertraline (ZOLOFT) 50 mg tablet Take 1 tablet (50 mg total) by mouth daily Daily at bedtime   • sodium chloride 1 g tablet Take 1 tablet (1 g total) by mouth in the morning   • spironolactone (ALDACTONE) 25 mg tablet Take 1 tablet (25 mg total) by mouth daily   • tiZANidine (ZANAFLEX) 4 mg tablet Take 1 tablet (4 mg total) by mouth every 8 (eight) hours as needed for muscle spasms   • Unifine SafeControl Pen Needle 30G X 5 MM MISC    • Victoza injection INJECT 0.3ML UNDER THE SKIN EVERY MORNING   • [DISCONTINUED] benzonatate (TESSALON PERLES) 100 mg capsule Take 2 capsules (200 mg total) by mouth 3 (three) times a day as needed for cough   • [DISCONTINUED] dextromethorphan-guaiFENesin (ROBITUSSIN DM)  mg/5 mL syrup Take 5 mL by mouth 3 (three) times a day as needed for cough or congestion   • [DISCONTINUED] insulin lispro (HumaLOG KwikPen) 100 units/mL injection pen Inject 20 Units under the skin 3 (three) times a day with meals       Objective     /80   Pulse 74   Temp 97.5 °F (36.4 °C)   Resp 20   SpO2 98%     Physical Exam  Constitutional:       General: He is not in acute distress. Appearance: Normal appearance. He is not ill-appearing, toxic-appearing or diaphoretic. HENT:      Head: Normocephalic and atraumatic. Right Ear: External ear normal.      Left Ear: External ear normal.      Nose: Nose normal. No congestion. Mouth/Throat:      Mouth: Mucous membranes are moist.   Eyes:      General:         Right eye: No discharge. Left eye: No discharge. Conjunctiva/sclera: Conjunctivae normal.   Cardiovascular:      Rate and Rhythm: Normal rate and regular rhythm.       Pulses: no weak pulses Dorsalis pedis pulses are 1+ on the right side and 1+ on the left side. Posterior tibial pulses are 1+ on the right side and 1+ on the left side. Heart sounds: Normal heart sounds. Pulmonary:      Effort: Pulmonary effort is normal. No respiratory distress. Breath sounds: Normal breath sounds. No wheezing, rhonchi or rales. Comments: Dry hacking cough noted  Abdominal:      General: Bowel sounds are normal.      Tenderness: There is no abdominal tenderness. There is no guarding. Musculoskeletal:         General: Tenderness present. Normal range of motion. Cervical back: Normal range of motion. No rigidity. Right lower leg: No edema. Left lower leg: No edema. Comments: C/o pain left side of head, neck, shoulder, entire arm and outer left leg to foot   Feet:      Right foot:      Skin integrity: No ulcer, skin breakdown, erythema, warmth, callus or dry skin. Left foot:      Skin integrity: No ulcer, skin breakdown, erythema, warmth, callus or dry skin. Skin:     General: Skin is warm and dry. Findings: No lesion or rash. Neurological:      General: No focal deficit present. Mental Status: He is alert and oriented to person, place, and time. Gait: Gait abnormal.      Comments: Uses scooter for long distance or walker   Psychiatric:         Mood and Affect: Mood normal.         Behavior: Behavior normal.         Thought Content: Thought content normal.         Judgment: Judgment normal.       Tuesday [place], CRNPDiabetic Foot Exam    Patient's shoes and socks removed. Right Foot/Ankle   Right Foot Inspection  Skin Exam: skin normal and skin intact. No dry skin, no warmth, no callus, no erythema, no maceration, no abnormal color, no pre-ulcer, no ulcer and no callus. Toe Exam: ROM and strength within normal limits. Vascular  Capillary refills: < 3 seconds  The right DP pulse is 1+. The right PT pulse is 1+.      Left Foot/Ankle  Left Foot Inspection  Skin Exam: skin normal and skin intact. No dry skin, no warmth, no erythema, no maceration, normal color, no pre-ulcer, no ulcer and no callus. Toe Exam: ROM and strength within normal limits. Vascular  Capillary refills: < 3 seconds  The left DP pulse is 1+. The left PT pulse is 1+.      Assign Risk Category  No deformity present  Loss of protective sensation  No weak pulses  Risk: 1

## 2023-08-25 ENCOUNTER — TELEPHONE (OUTPATIENT)
Dept: FAMILY MEDICINE CLINIC | Facility: CLINIC | Age: 63
End: 2023-08-25

## 2023-08-25 NOTE — TELEPHONE ENCOUNTER
Thank you Devonte Deluca, I just spoke with . Alonzo, He  is requesting to be called on Monday 8/28/2023 as he will not be available the rest of today. I spoke with Kelvin Keller scheduling to confirm same. He is scheduled to be called Monday AM

## 2023-08-25 NOTE — TELEPHONE ENCOUNTER
Completed form has been faxed to the number listed below copy has been attached to this encounter.       [phone] fax    [employer]

## 2023-08-25 NOTE — TELEPHONE ENCOUNTER
Pt returning Selena's call. He got a message from [employer] but they did not have any information regarding the order placed. Pt asking for a return call regarding this.

## 2023-08-31 ENCOUNTER — TELEPHONE (OUTPATIENT)
Dept: FAMILY MEDICINE CLINIC | Facility: CLINIC | Age: 63
End: 2023-08-31

## 2023-09-05 ENCOUNTER — HOSPITAL ENCOUNTER (OUTPATIENT)
Dept: GASTROENTEROLOGY | Facility: AMBULARY SURGERY CENTER | Age: 63
Setting detail: OUTPATIENT SURGERY
Discharge: HOME | End: 2023-09-05
Attending: INTERNAL MEDICINE
Payer: COMMERCIAL

## 2023-09-05 ENCOUNTER — ANESTHESIA (OUTPATIENT)
Dept: GASTROENTEROLOGY | Facility: AMBULARY SURGERY CENTER | Age: 63
End: 2023-09-05

## 2023-09-05 ENCOUNTER — ANESTHESIA EVENT (OUTPATIENT)
Dept: GASTROENTEROLOGY | Facility: AMBULARY SURGERY CENTER | Age: 63
End: 2023-09-05

## 2023-09-05 VITALS
RESPIRATION RATE: 18 BRPM | DIASTOLIC BLOOD PRESSURE: 90 MMHG | OXYGEN SATURATION: 100 % | SYSTOLIC BLOOD PRESSURE: 156 MMHG | WEIGHT: 240 LBS | HEART RATE: 79 BPM | BODY MASS INDEX: 33.6 KG/M2 | HEIGHT: 71 IN | TEMPERATURE: 97.9 F

## 2023-09-05 DIAGNOSIS — Z53.8 PROCEDURE NOT CARRIED OUT FOR OTHER REASONS: ICD-10-CM

## 2023-09-05 DIAGNOSIS — K21.9 GASTROESOPHAGEAL REFLUX DISEASE WITHOUT ESOPHAGITIS: ICD-10-CM

## 2023-09-05 DIAGNOSIS — K31.84 GASTROPARESIS: ICD-10-CM

## 2023-09-05 DIAGNOSIS — R19.4 CHANGE IN BOWEL HABITS: ICD-10-CM

## 2023-09-05 DIAGNOSIS — K21.00 GASTROESOPHAGEAL REFLUX DISEASE WITH ESOPHAGITIS WITHOUT HEMORRHAGE: Primary | ICD-10-CM

## 2023-09-05 PROCEDURE — 88305 TISSUE EXAM BY PATHOLOGIST: CPT | Performed by: PATHOLOGY

## 2023-09-05 PROCEDURE — 43239 EGD BIOPSY SINGLE/MULTIPLE: CPT | Performed by: INTERNAL MEDICINE

## 2023-09-05 PROCEDURE — 45385 COLONOSCOPY W/LESION REMOVAL: CPT | Performed by: INTERNAL MEDICINE

## 2023-09-05 PROCEDURE — 45380 COLONOSCOPY AND BIOPSY: CPT | Performed by: INTERNAL MEDICINE

## 2023-09-05 RX ORDER — SODIUM CHLORIDE, SODIUM LACTATE, POTASSIUM CHLORIDE, CALCIUM CHLORIDE 600; 310; 30; 20 MG/100ML; MG/100ML; MG/100ML; MG/100ML
125 INJECTION, SOLUTION INTRAVENOUS CONTINUOUS
Status: DISCONTINUED | OUTPATIENT
Start: 2023-09-05 | End: 2023-09-09 | Stop reason: HOSPADM

## 2023-09-05 RX ORDER — LIDOCAINE HYDROCHLORIDE 10 MG/ML
INJECTION, SOLUTION EPIDURAL; INFILTRATION; INTRACAUDAL; PERINEURAL AS NEEDED
Status: DISCONTINUED | OUTPATIENT
Start: 2023-09-05 | End: 2023-09-05

## 2023-09-05 RX ORDER — PROPOFOL 10 MG/ML
INJECTION, EMULSION INTRAVENOUS CONTINUOUS PRN
Status: DISCONTINUED | OUTPATIENT
Start: 2023-09-05 | End: 2023-09-05

## 2023-09-05 RX ORDER — SODIUM CHLORIDE, SODIUM LACTATE, POTASSIUM CHLORIDE, CALCIUM CHLORIDE 600; 310; 30; 20 MG/100ML; MG/100ML; MG/100ML; MG/100ML
INJECTION, SOLUTION INTRAVENOUS CONTINUOUS PRN
Status: DISCONTINUED | OUTPATIENT
Start: 2023-09-05 | End: 2023-09-05

## 2023-09-05 RX ORDER — SODIUM CHLORIDE, SODIUM LACTATE, POTASSIUM CHLORIDE, CALCIUM CHLORIDE 600; 310; 30; 20 MG/100ML; MG/100ML; MG/100ML; MG/100ML
125 INJECTION, SOLUTION INTRAVENOUS CONTINUOUS
Status: CANCELLED | OUTPATIENT
Start: 2023-09-05

## 2023-09-05 RX ORDER — PROPOFOL 10 MG/ML
INJECTION, EMULSION INTRAVENOUS AS NEEDED
Status: DISCONTINUED | OUTPATIENT
Start: 2023-09-05 | End: 2023-09-05

## 2023-09-05 RX ORDER — PANTOPRAZOLE SODIUM 40 MG/1
40 TABLET, DELAYED RELEASE ORAL DAILY
Qty: 30 TABLET | Refills: 1 | Status: SHIPPED | OUTPATIENT
Start: 2023-09-05

## 2023-09-05 RX ORDER — PHENYLEPHRINE HYDROCHLORIDE 10 MG/ML
INJECTION INTRAVENOUS AS NEEDED
Status: DISCONTINUED | OUTPATIENT
Start: 2023-09-05 | End: 2023-09-05

## 2023-09-05 RX ADMIN — PROPOFOL 30 MG: 10 INJECTION, EMULSION INTRAVENOUS at 10:36

## 2023-09-05 RX ADMIN — PROPOFOL 50 MCG/KG/MIN: 10 INJECTION, EMULSION INTRAVENOUS at 10:35

## 2023-09-05 RX ADMIN — PROPOFOL 30 MG: 10 INJECTION, EMULSION INTRAVENOUS at 10:34

## 2023-09-05 RX ADMIN — PROPOFOL 80 MG: 10 INJECTION, EMULSION INTRAVENOUS at 10:32

## 2023-09-05 RX ADMIN — SODIUM CHLORIDE, SODIUM LACTATE, POTASSIUM CHLORIDE, AND CALCIUM CHLORIDE 125 ML/HR: .6; .31; .03; .02 INJECTION, SOLUTION INTRAVENOUS at 10:04

## 2023-09-05 RX ADMIN — LIDOCAINE HYDROCHLORIDE 50 MG: 10 INJECTION, SOLUTION EPIDURAL; INFILTRATION; INTRACAUDAL at 10:32

## 2023-09-05 RX ADMIN — SODIUM CHLORIDE, SODIUM LACTATE, POTASSIUM CHLORIDE, AND CALCIUM CHLORIDE: .6; .31; .03; .02 INJECTION, SOLUTION INTRAVENOUS at 10:26

## 2023-09-05 RX ADMIN — PHENYLEPHRINE HYDROCHLORIDE 50 MCG: 10 INJECTION INTRAVENOUS at 10:47

## 2023-09-05 RX ADMIN — PHENYLEPHRINE HYDROCHLORIDE 50 MCG: 10 INJECTION INTRAVENOUS at 10:49
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
## 2023-09-05 NOTE — ANESTHESIA POSTPROCEDURE EVALUATION
Post-Op Assessment Note    CV Status:  Stable  Pain Score: 0    Pain management: adequate     Mental Status:  Sleepy   Hydration Status:  Stable   PONV Controlled:  None   Airway Patency:  Patent   Two or more mitigation strategies used for obstructive sleep apnea   Post Op Vitals Reviewed: Yes      Staff: CRNA         No notable events documented.     BP   118/64   Temp 97   Pulse 68   Resp 16   SpO2 99

## 2023-09-05 NOTE — ANESTHESIA PREPROCEDURE EVALUATION
Procedure:  COLONOSCOPY  EGD    Relevant Problems   CARDIO   (+) Acute on chronic diastolic congestive heart failure (HCC)   (+) Atherosclerotic heart disease of native coronary artery without angina pectoris   (+) Chest pain   (+) Chronic a-fib (HCC)   (+) Coronary artery disease   (+) Essential hypertension   (+) Hyperlipidemia   (+) Paroxysmal atrial fibrillation (HCC)      ENDO   (+) Type 2 diabetes mellitus with complication, with long-term current use of insulin (HCC)      GI/HEPATIC   (+) Esophageal reflux      /RENAL   (+) Chronic kidney disease, stage 2 (mild)   (+) Hypertensive heart and chronic kidney disease with heart failure and stage 1 through stage 4 chronic kidney disease, or unspecified chronic kidney disease (HCC)      HEMATOLOGY   (+) Nutritional anemia, unspecified       NEURO/PSYCH   (+) Chronic pain syndrome   (+) Diabetic neuropathy ([facility])   (+) Panic disorder without agoraphobia      PULMONARY   (+) COPD (chronic obstructive pulmonary disease) ([facility])   (+) Chronic obstructive pulmonary disease, unspecified (HCC)   (+) Chronic respiratory failure ([facility])   (+) SHAVONNE and COPD overlap syndrome    (+) Obstructive sleep apnea   (+) Simple chronic bronchitis (HCC)      Other   (+) Chronic lymphocytic leukemia of B-cell type in remission ([address])      Oxygen dependent at home, 3L continuous  Physical Exam    Airway    Mallampati score: III  TM Distance: >3 FB  Neck ROM: full     Dental   Comment: edentulous,     Cardiovascular  Cardiovascular exam normal    Pulmonary  Pulmonary exam normal     Other Findings  Portions of exam deferred due to low yield and/or unknown COVID status      Anesthesia Plan  ASA Score- 4     Anesthesia Type- IV sedation with anesthesia with ASA Monitors. Additional Monitors:   Airway Plan:           Plan Factors-Exercise tolerance (METS): >4 METS. Chart reviewed. Existing labs reviewed. Patient summary reviewed. Patient is not a current smoker.          Induction- intravenous. Postoperative Plan-     Informed Consent- Anesthetic plan and risks discussed with patient. I personally reviewed this patient with the CRNA. Discussed and agreed on the Anesthesia Plan with the CRNA. Michi Almonte

## 2023-09-06 DIAGNOSIS — J41.0 SIMPLE CHRONIC BRONCHITIS (HCC): Chronic | ICD-10-CM

## 2023-09-06 DIAGNOSIS — M79.18 MYOFASCIAL PAIN SYNDROME: ICD-10-CM

## 2023-09-06 RX ORDER — TIZANIDINE 4 MG/1
4 TABLET ORAL EVERY 8 HOURS PRN
Qty: 75 TABLET | Refills: 2 | OUTPATIENT
Start: 2023-09-06

## 2023-09-06 RX ORDER — DEXTROMETHORPHAN HYDROBROMIDE, GUAIFENESIN 10; 100 MG/5ML; MG/5ML
LIQUID ORAL
Qty: 237 ML | Refills: 1 | Status: SHIPPED | OUTPATIENT
Start: 2023-09-06

## 2023-09-07 ENCOUNTER — VBI (OUTPATIENT)
Dept: ADMINISTRATIVE | Facility: OTHER | Age: 63
End: 2023-09-07

## 2023-09-07 PROCEDURE — 88305 TISSUE EXAM BY PATHOLOGIST: CPT | Performed by: PATHOLOGY

## 2023-09-07 NOTE — TELEPHONE ENCOUNTER
Called [facility] to confirm fax number. Multiple attempts to send have been unsuccessful. Left  for return call.      Form placed in white clerical needs attention folder

## 2023-09-07 NOTE — TELEPHONE ENCOUNTER
09/07/23 3:10 PM     VB [employer] SmartForm used to document caregap status.     Sanjuanita Goveaor [place]

## 2023-09-13 ENCOUNTER — TELEPHONE (OUTPATIENT)
Dept: INFECTIOUS DISEASES | Facility: CLINIC | Age: 63
End: 2023-09-13

## 2023-09-13 DIAGNOSIS — E11.65 TYPE 2 DIABETES MELLITUS WITH HYPERGLYCEMIA, WITH LONG-TERM CURRENT USE OF INSULIN (HCC): ICD-10-CM

## 2023-09-13 DIAGNOSIS — G62.9 PERIPHERAL POLYNEUROPATHY: ICD-10-CM

## 2023-09-13 DIAGNOSIS — I50.22 CHRONIC SYSTOLIC HEART FAILURE (HCC): ICD-10-CM

## 2023-09-13 DIAGNOSIS — Z79.4 TYPE 2 DIABETES MELLITUS WITH HYPERGLYCEMIA, WITH LONG-TERM CURRENT USE OF INSULIN (HCC): ICD-10-CM

## 2023-09-13 DIAGNOSIS — F99 PSYCHIATRIC DISORDER: ICD-10-CM

## 2023-09-13 DIAGNOSIS — Z79.4 TYPE 2 DIABETES MELLITUS WITH COMPLICATION, WITH LONG-TERM CURRENT USE OF INSULIN (HCC): ICD-10-CM

## 2023-09-13 DIAGNOSIS — E11.8 TYPE 2 DIABETES MELLITUS WITH COMPLICATION, WITH LONG-TERM CURRENT USE OF INSULIN (HCC): ICD-10-CM

## 2023-09-13 RX ORDER — GABAPENTIN 800 MG/1
800 TABLET ORAL 4 TIMES DAILY
Qty: 120 TABLET | Refills: 0 | Status: SHIPPED | OUTPATIENT
Start: 2023-09-13

## 2023-09-13 RX ORDER — FLASH GLUCOSE SENSOR
KIT MISCELLANEOUS
Qty: 6 EACH | Refills: 0 | OUTPATIENT
Start: 2023-09-13

## 2023-09-13 RX ORDER — FLASH GLUCOSE SENSOR
1 KIT MISCELLANEOUS
Qty: 6 EACH | Refills: 5 | Status: SHIPPED | OUTPATIENT
Start: 2023-09-13 | End: 2023-09-26 | Stop reason: SDUPTHER

## 2023-09-13 RX ORDER — LOSARTAN POTASSIUM 50 MG/1
50 TABLET ORAL DAILY
Qty: 90 TABLET | Refills: 3 | Status: SHIPPED | OUTPATIENT
Start: 2023-09-13 | End: 2023-10-13

## 2023-09-13 RX ORDER — QUETIAPINE FUMARATE 300 MG/1
300 TABLET, FILM COATED ORAL
Qty: 30 TABLET | Refills: 0 | Status: SHIPPED | OUTPATIENT
Start: 2023-09-13 | End: 2023-10-13

## 2023-09-13 NOTE — PROGRESS NOTES
I spoke with Rik at [address] [place] [address], they did not have the patient's correct phone number. I gave him patient's updated number. He states he will contact patient to schedule. I called and spoke to [facility] and informed him of same.

## 2023-09-13 NOTE — TELEPHONE ENCOUNTER
I spoke with Rik at [address] [place] [address], they did not have the patient's correct phone number. I gave him patient's updated number. He states he will contact patient to schedule. I called and spoke to [facility] and informed him of same.

## 2023-09-14 ENCOUNTER — TELEPHONE (OUTPATIENT)
Dept: GASTROENTEROLOGY | Facility: CLINIC | Age: 63
End: 2023-09-14

## 2023-09-14 NOTE — TELEPHONE ENCOUNTER
----- Message from [employer] sent at 9/11/2023  3:01 PM EDT -----  Written by Tommy Welsh MD on 9/11/2023  8:43 AM EDT  Seen by patient Michael Sargentt on 9/11/2023 11:30 AM    Patient aware of results via [employer]. 5 year colon recall and 3 year EGD recall entered into chart. Attempted to call patient to schedule, was unable to do so. Please call patient to schedule for office visit follow up. Thank you!

## 2023-09-19 ENCOUNTER — TELEPHONE (OUTPATIENT)
Dept: FAMILY MEDICINE CLINIC | Facility: CLINIC | Age: 63
End: 2023-09-19

## 2023-09-20 DIAGNOSIS — E11.8 TYPE 2 DIABETES MELLITUS WITH COMPLICATION, WITH LONG-TERM CURRENT USE OF INSULIN (HCC): Primary | ICD-10-CM

## 2023-09-20 DIAGNOSIS — Z79.4 TYPE 2 DIABETES MELLITUS WITH COMPLICATION, WITH LONG-TERM CURRENT USE OF INSULIN (HCC): Primary | ICD-10-CM

## 2023-09-22 NOTE — TELEPHONE ENCOUNTER
I spoke with Flor Ruiz at [employer] She states they are preparing the overnight oximetry to be mailed out today. I LVM for Alonzo to inform him  of same

## 2023-09-25 ENCOUNTER — APPOINTMENT (EMERGENCY)
Dept: RADIOLOGY | Facility: HOSPITAL | Age: 63
End: 2023-09-25
Payer: COMMERCIAL

## 2023-09-25 ENCOUNTER — HOSPITAL ENCOUNTER (EMERGENCY)
Facility: HOSPITAL | Age: 63
Discharge: HOME | End: 2023-09-25
Attending: EMERGENCY MEDICINE
Payer: COMMERCIAL

## 2023-09-25 ENCOUNTER — TELEPHONE (OUTPATIENT)
Dept: FAMILY MEDICINE CLINIC | Facility: CLINIC | Age: 63
End: 2023-09-25

## 2023-09-25 ENCOUNTER — TELEPHONE (OUTPATIENT)
Dept: PULMONOLOGY | Facility: MEDICAL CENTER | Age: 63
End: 2023-09-25

## 2023-09-25 VITALS
DIASTOLIC BLOOD PRESSURE: 80 MMHG | OXYGEN SATURATION: 96 % | SYSTOLIC BLOOD PRESSURE: 144 MMHG | TEMPERATURE: 98.9 F | HEART RATE: 56 BPM | RESPIRATION RATE: 20 BRPM

## 2023-09-25 DIAGNOSIS — R06.00 DYSPNEA: Primary | ICD-10-CM

## 2023-09-25 DIAGNOSIS — R07.9 CHEST PAIN: ICD-10-CM

## 2023-09-25 LAB
2HR DELTA HS TROPONIN: 0 NG/L
ALBUMIN SERPL BCP-MCNC: 4 G/DL (ref 3.5–5)
ALP SERPL-CCNC: 56 U/L (ref 34–104)
ALT SERPL W P-5'-P-CCNC: 18 U/L (ref 7–52)
ANION GAP SERPL CALCULATED.3IONS-SCNC: 4 MMOL/L
AST SERPL W P-5'-P-CCNC: 16 U/L (ref 13–39)
ATRIAL RATE: 234 BPM
BASOPHILS # BLD MANUAL: 0 THOUSAND/UL (ref 0–0.1)
BASOPHILS NFR MAR MANUAL: 0 % (ref 0–1)
BILIRUB SERPL-MCNC: 0.59 MG/DL (ref 0.2–1)
BNP SERPL-MCNC: 173 PG/ML (ref 0–100)
BUN SERPL-MCNC: 5 MG/DL (ref 5–25)
CALCIUM SERPL-MCNC: 8.9 MG/DL (ref 8.4–10.2)
CARDIAC TROPONIN I PNL SERPL HS: 7 NG/L
CARDIAC TROPONIN I PNL SERPL HS: 7 NG/L
CHLORIDE SERPL-SCNC: 99 MMOL/L (ref 96–108)
CO2 SERPL-SCNC: 29 MMOL/L (ref 21–32)
CREAT SERPL-MCNC: 0.69 MG/DL (ref 0.6–1.3)
DIGOXIN SERPL-MCNC: 1.2 NG/ML (ref 0.8–2)
EOSINOPHIL # BLD MANUAL: 0 THOUSAND/UL (ref 0–0.4)
EOSINOPHIL NFR BLD MANUAL: 0 % (ref 0–6)
ERYTHROCYTE [DISTWIDTH] IN BLOOD BY AUTOMATED COUNT: 12.3 % (ref 11.6–15.1)
FLUAV RNA RESP QL NAA+PROBE: NEGATIVE
FLUBV RNA RESP QL NAA+PROBE: NEGATIVE
GFR SERPL CREATININE-BSD FRML MDRD: 100 ML/MIN/1.73SQ M
GLUCOSE SERPL-MCNC: 197 MG/DL (ref 65–140)
HCT VFR BLD AUTO: 36.9 % (ref 36.5–49.3)
HGB BLD-MCNC: 12.9 G/DL (ref 12–17)
LYMPHOCYTES # BLD AUTO: 49 % (ref 14–44)
LYMPHOCYTES # BLD AUTO: 9.31 THOUSAND/UL (ref 0.6–4.47)
MCH RBC QN AUTO: 33.2 PG (ref 26.8–34.3)
MCHC RBC AUTO-ENTMCNC: 35 G/DL (ref 31.4–37.4)
MCV RBC AUTO: 95 FL (ref 82–98)
MONOCYTES # BLD AUTO: 0.8 THOUSAND/UL (ref 0–1.22)
MONOCYTES NFR BLD: 5 % (ref 4–12)
NEUTROPHILS # BLD MANUAL: 5.94 THOUSAND/UL (ref 1.85–7.62)
NEUTS SEG NFR BLD AUTO: 37 % (ref 43–75)
PLATELET # BLD AUTO: 127 THOUSANDS/UL (ref 149–390)
PLATELET BLD QL SMEAR: ABNORMAL
PMV BLD AUTO: 9.8 FL (ref 8.9–12.7)
POTASSIUM SERPL-SCNC: 4.7 MMOL/L (ref 3.5–5.3)
PROT SERPL-MCNC: 6.1 G/DL (ref 6.4–8.4)
QRS AXIS: 75 DEGREES
QRSD INTERVAL: 84 MS
QT INTERVAL: 406 MS
QTC INTERVAL: 380 MS
RBC # BLD AUTO: 3.89 MILLION/UL (ref 3.88–5.62)
RBC MORPH BLD: NORMAL
RSV RNA RESP QL NAA+PROBE: NEGATIVE
SARS-COV-2 RNA RESP QL NAA+PROBE: NEGATIVE
SMUDGE CELLS BLD QL SMEAR: PRESENT
SODIUM SERPL-SCNC: 132 MMOL/L (ref 135–147)
T WAVE AXIS: 47 DEGREES
VARIANT LYMPHS # BLD AUTO: 9 %
VENTRICULAR RATE: 53 BPM
WBC # BLD AUTO: 16.05 THOUSAND/UL (ref 4.31–10.16)

## 2023-09-25 PROCEDURE — 83880 ASSAY OF NATRIURETIC PEPTIDE: CPT | Performed by: EMERGENCY MEDICINE

## 2023-09-25 PROCEDURE — 99285 EMERGENCY DEPT VISIT HI MDM: CPT

## 2023-09-25 PROCEDURE — 85007 BL SMEAR W/DIFF WBC COUNT: CPT | Performed by: EMERGENCY MEDICINE

## 2023-09-25 PROCEDURE — 99285 EMERGENCY DEPT VISIT HI MDM: CPT | Performed by: EMERGENCY MEDICINE

## 2023-09-25 PROCEDURE — 93010 ELECTROCARDIOGRAM REPORT: CPT | Performed by: INTERNAL MEDICINE

## 2023-09-25 PROCEDURE — 93005 ELECTROCARDIOGRAM TRACING: CPT

## 2023-09-25 PROCEDURE — 36415 COLL VENOUS BLD VENIPUNCTURE: CPT | Performed by: EMERGENCY MEDICINE

## 2023-09-25 PROCEDURE — 84484 ASSAY OF TROPONIN QUANT: CPT | Performed by: EMERGENCY MEDICINE

## 2023-09-25 PROCEDURE — 80053 COMPREHEN METABOLIC PANEL: CPT | Performed by: EMERGENCY MEDICINE

## 2023-09-25 PROCEDURE — 80162 ASSAY OF DIGOXIN TOTAL: CPT | Performed by: EMERGENCY MEDICINE

## 2023-09-25 PROCEDURE — 96374 THER/PROPH/DIAG INJ IV PUSH: CPT

## 2023-09-25 PROCEDURE — 0241U HB NFCT DS VIR RESP RNA 4 TRGT: CPT | Performed by: EMERGENCY MEDICINE

## 2023-09-25 PROCEDURE — 85027 COMPLETE CBC AUTOMATED: CPT | Performed by: EMERGENCY MEDICINE

## 2023-09-25 PROCEDURE — 71045 X-RAY EXAM CHEST 1 VIEW: CPT

## 2023-09-25 RX ORDER — FUROSEMIDE 10 MG/ML
20 INJECTION INTRAMUSCULAR; INTRAVENOUS ONCE
Status: COMPLETED | OUTPATIENT
Start: 2023-09-25 | End: 2023-09-25

## 2023-09-25 RX ORDER — DEXAMETHASONE 4 MG/1
10 TABLET ORAL ONCE
Status: COMPLETED | OUTPATIENT
Start: 2023-09-25 | End: 2023-09-25

## 2023-09-25 RX ORDER — ACETAMINOPHEN 325 MG/1
650 TABLET ORAL ONCE
Status: COMPLETED | OUTPATIENT
Start: 2023-09-25 | End: 2023-09-25

## 2023-09-25 RX ADMIN — DEXAMETHASONE 10 MG: 4 TABLET ORAL at 14:24

## 2023-09-25 RX ADMIN — ACETAMINOPHEN 650 MG: 325 TABLET ORAL at 12:44

## 2023-09-25 RX ADMIN — FUROSEMIDE 20 MG: 10 INJECTION, SOLUTION INTRAMUSCULAR; INTRAVENOUS at 14:25

## 2023-09-25 NOTE — TELEPHONE ENCOUNTER
Pt called stating that he has been more sob since last week and has swelling in his left leg. He also stated that he has hx of afib. I advise for him to go to the ER if symptoms became worse. He confirmed understanding.

## 2023-09-25 NOTE — ED PROVIDER NOTES
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      losartan (COZAAR) 50 mg tablet Take 1 tablet (50 mg total) by mouth daily, Starting Wed 9/13/2023, Until Fri 10/13/2023, Normal      magnesium (MAGTAB) 84 MG (7MEQ) TBCR Take 1 tablet (84 mg total) by mouth daily, Starting Mon 6/19/2023, Until Mon 8/28/2023, Normal      metFORMIN (GLUCOPHAGE) 1000 MG tablet Take 1 tablet (1,000 mg total) by mouth 2 (two) times a day with meals, Starting Thu 8/17/2023, Until Sat 9/16/2023, Normal      metoprolol succinate (TOPROL-XL) 200 MG 24 hr tablet Take 1 tablet (200 mg total) by mouth daily, Starting Tue 6/13/2023, Until Mon 9/29/2031, Normal      Multiple Vitamins-Minerals (CENTRUM SILVER 50+MEN PO) Take by mouth, Historical Med      omeprazole (PriLOSEC) 20 mg delayed release capsule Take 1 capsule (20 mg total) by mouth daily, Starting Tue 6/13/2023, Until Mon 8/28/2023, Normal      pantoprazole (PROTONIX) 40 mg tablet Take 1 tablet (40 mg total) by mouth daily, Starting Tue 9/5/2023, Normal      polyethylene glycol (GOLYTELY) 4000 mL solution Take 4,000 mL by mouth once for 1 dose, Starting Fri 8/18/2023, Normal      potassium chloride (K-DUR,KLOR-CON) 20 mEq tablet Take 1 tablet (20 mEq total) by mouth daily, Starting Tue 3/14/2023, Until Mon 8/28/2023, Normal      QUEtiapine (SEROquel) 300 mg tablet Take 1 tablet (300 mg total) by mouth daily at bedtime, Starting Wed 9/13/2023, Until Fri 10/13/2023, Normal      sertraline (ZOLOFT) 50 mg tablet Take 1 tablet (50 mg total) by mouth daily Daily at bedtime, Starting Mon 9/19/2022, Normal      sodium chloride 1 g tablet Take 1 tablet (1 g total) by mouth in the morning, Starting Mon 6/19/2023, Until Mon 8/28/2023, Normal      spironolactone (ALDACTONE) 25 mg tablet Take 1 tablet (25 mg total) by mouth daily, Starting Wed 8/23/2023, Until Tue 11/21/2023, Normal      traMADol (ULTRAM) 50 mg tablet TAKE 1 TABLET EVERY 6 HOURS AS NEEDED FOR MODERATE PAIN, Starting Thu 9/21/2023, Normal      !! Unifine SafeControl Pen Needle 30G X 5 MM MISC Starting Mon 5/2/2022, Historical Med      Alcohol Swabs (Alcohol Prep) 70 % PADS Apply topically 4 (four) times a day As directed, Starting Thu 8/17/2023, Normal      !! Continuous Blood Gluc Sensor (FreeStyle Sheba 14 Day Sensor) MISC Use 1 application. every 14 (fourteen) days, Starting Wed 9/13/2023, Normal      glucose blood (ReliOn True Metrix Test Strips) test strip Use as instructed, Normal      tiZANidine (ZANAFLEX) 4 mg tablet Take 1 tablet (4 mg total) by mouth every 8 (eight) hours as needed for muscle spasms, Starting Wed 7/5/2023, Normal      Victoza injection INJECT 0.3ML UNDER THE SKIN EVERY MORNING, Normal       !! - Potential duplicate medications found. Please discuss with provider.               PDMP Review       Value Time User    PDMP Reviewed  Yes 4/27/2023  8:[address] Sharma, [address] [place] [address] Provider  Electronically Signed by           Casi Bynum DO  09/26/23 3040

## 2023-09-26 ENCOUNTER — VBI (OUTPATIENT)
Dept: FAMILY MEDICINE CLINIC | Facility: CLINIC | Age: 63
End: 2023-09-26

## 2023-09-26 DIAGNOSIS — E11.65 TYPE 2 DIABETES MELLITUS WITH HYPERGLYCEMIA, WITH LONG-TERM CURRENT USE OF INSULIN (HCC): ICD-10-CM

## 2023-09-26 DIAGNOSIS — E11.65 UNCONTROLLED TYPE 2 DIABETES MELLITUS WITH HYPERGLYCEMIA (HCC): ICD-10-CM

## 2023-09-26 DIAGNOSIS — Z79.4 TYPE 2 DIABETES MELLITUS WITH HYPERGLYCEMIA, WITH LONG-TERM CURRENT USE OF INSULIN (HCC): ICD-10-CM

## 2023-09-26 RX ORDER — BLOOD SUGAR DIAGNOSTIC
STRIP MISCELLANEOUS
Qty: 25 EACH | Refills: 0 | Status: SHIPPED | OUTPATIENT
Start: 2023-09-26

## 2023-09-26 RX ORDER — FLASH GLUCOSE SENSOR
1 KIT MISCELLANEOUS
Qty: 6 EACH | Refills: 0 | Status: SHIPPED | OUTPATIENT
Start: 2023-09-26

## 2023-09-26 RX ORDER — UBIQUINOL 100 MG
CAPSULE ORAL 4 TIMES DAILY
Qty: 100 EACH | Refills: 0 | Status: SHIPPED | OUTPATIENT
Start: 2023-09-26

## 2023-09-26 RX ORDER — LIRAGLUTIDE 6 MG/ML
INJECTION SUBCUTANEOUS
Qty: 9 ML | Refills: 0 | Status: SHIPPED | OUTPATIENT
Start: 2023-09-26

## 2023-09-26 NOTE — TELEPHONE ENCOUNTER
09/26/23 8:39 AM    Patient contacted post ED visit, first outreach attempt made. Message was left for patient to return a call to the VBI Department at [facility]: Phone [phone]. Thank you.   ROBBIE DONALD  [facility]

## 2023-09-26 NOTE — TELEPHONE ENCOUNTER
Completed form has been faxed to the number listed below; and placed in "TO BE SCANNED [address] [place] [address]"        [phone]

## 2023-09-27 DIAGNOSIS — J44.1 COPD EXACERBATION (HCC): Primary | ICD-10-CM

## 2023-09-27 RX ORDER — AZITHROMYCIN 250 MG/1
TABLET, FILM COATED ORAL
Qty: 6 TABLET | Refills: 0 | Status: SHIPPED | OUTPATIENT
Start: 2023-09-27 | End: 2023-10-01

## 2023-09-27 RX ORDER — PREDNISONE 20 MG/1
TABLET ORAL
Qty: 30 TABLET | Refills: 0 | Status: SHIPPED | OUTPATIENT
Start: 2023-09-27

## 2023-09-27 NOTE — TELEPHONE ENCOUNTER
09/27/23 9:10 AM    Patient contacted post ED visit, VBI department spoke with patient/caregiver and outreach was successful. Thank you.   ROBBIE DONALD  [facility]

## 2023-09-27 NOTE — PROGRESS NOTES
Patient is still short of breath. He has little cough and wheezing. Patient has been seeing him for a hematologic malignance. We are going to offer him tapering dose of prednisone and Z ewa. I will order him prednisone 20 mg tablets. He will take 40mg x 4 days, 30 mg x 4 days, 20 mg x 4 days and 10 mg dialy x 4 days. All questions were answered.

## 2023-10-16 ENCOUNTER — TELEPHONE (OUTPATIENT)
Dept: FAMILY MEDICINE CLINIC | Facility: CLINIC | Age: 63
End: 2023-10-16

## 2023-10-16 NOTE — TELEPHONE ENCOUNTER
Message left on Clinical Line-           Yes, this is [facility]. My YOB: 1960 and my doctor is Tuesday galen [facility]. She does the prescriptions and I have a different phone number to give you right now. Site twice, [phone] and again that's [phone], excuse me, [phone]. I need to have somebody call me back, please, and I leave messages previously there. Nobody ever gets back to me, but I need somebody to call me back, please. Again, my name is Reina Overton. Date of birth September 24th, 1960. Phone number is [phone]. Thank you. Have a good day. Bye. Bye. You received a voice mail from [facility]. This message has been addressed;  Tried returning patients call reached , left message. Routing to PCP.

## 2023-10-17 ENCOUNTER — TELEPHONE (OUTPATIENT)
Dept: HEMATOLOGY ONCOLOGY | Facility: CLINIC | Age: 63
End: 2023-10-17

## 2023-10-17 DIAGNOSIS — C91.11 CHRONIC LYMPHOCYTIC LEUKEMIA OF B-CELL TYPE IN REMISSION (HCC): Primary | ICD-10-CM

## 2023-10-17 NOTE — TELEPHONE ENCOUNTER
Patient c/o extreme fatigue   Has history of CLL with many comorbidities  Labs entered and patient will have drawn for MD review

## 2023-10-17 NOTE — TELEPHONE ENCOUNTER
Patient Call    Who are you speaking with? Patient    If it is not the patient, are they listed on an active communication consent form? Yes   What is the reason for this call? Patient has questions as he is very tired all the time. Wants to know what to do next. Does this require a call back? Yes   If a call back is required, please list best call back number [phone]   If a call back is required, advise that a message will be forwarded to their care team and someone will return their call as soon as possible. Did you relay this information to the patient?  Yes

## 2023-10-18 DIAGNOSIS — E78.2 MIXED HYPERLIPIDEMIA: ICD-10-CM

## 2023-10-18 DIAGNOSIS — E87.1 HYPONATREMIA: ICD-10-CM

## 2023-10-18 DIAGNOSIS — K21.00 GASTROESOPHAGEAL REFLUX DISEASE WITH ESOPHAGITIS WITHOUT HEMORRHAGE: ICD-10-CM

## 2023-10-18 DIAGNOSIS — E11.8 TYPE 2 DIABETES MELLITUS WITH COMPLICATION, WITH LONG-TERM CURRENT USE OF INSULIN (HCC): Primary | ICD-10-CM

## 2023-10-18 DIAGNOSIS — R52 SEVERE PAIN: ICD-10-CM

## 2023-10-18 DIAGNOSIS — Z79.4 TYPE 2 DIABETES MELLITUS WITH COMPLICATION, WITH LONG-TERM CURRENT USE OF INSULIN (HCC): Primary | ICD-10-CM

## 2023-10-18 RX ORDER — SODIUM CHLORIDE 1 G/1
1 TABLET ORAL EVERY MORNING
Qty: 30 TABLET | Refills: 3 | Status: ON HOLD | OUTPATIENT
Start: 2023-10-18

## 2023-10-18 RX ORDER — PANTOPRAZOLE SODIUM 40 MG/1
40 TABLET, DELAYED RELEASE ORAL DAILY
Qty: 30 TABLET | Refills: 1 | Status: ON HOLD | OUTPATIENT
Start: 2023-10-18

## 2023-10-18 RX ORDER — TRAMADOL HYDROCHLORIDE 50 MG/1
50 TABLET ORAL EVERY 6 HOURS PRN
Qty: 120 TABLET | Refills: 1 | Status: ON HOLD | OUTPATIENT
Start: 2023-10-18

## 2023-10-18 RX ORDER — ATORVASTATIN CALCIUM 40 MG/1
TABLET, FILM COATED ORAL
Qty: 90 TABLET | Refills: 3 | Status: SHIPPED | OUTPATIENT
Start: 2023-10-18 | End: 2023-10-20

## 2023-10-19 ENCOUNTER — APPOINTMENT (OUTPATIENT)
Dept: LAB | Facility: CLINIC | Age: 63
DRG: 191 | End: 2023-10-19
Payer: COMMERCIAL

## 2023-10-19 ENCOUNTER — IN HOME VISIT (OUTPATIENT)
Dept: FAMILY MEDICINE CLINIC | Facility: CLINIC | Age: 63
End: 2023-10-19

## 2023-10-19 DIAGNOSIS — C91.11 CHRONIC LYMPHOCYTIC LEUKEMIA OF B-CELL TYPE IN REMISSION (HCC): Primary | ICD-10-CM

## 2023-10-19 DIAGNOSIS — E11.65 UNCONTROLLED TYPE 2 DIABETES MELLITUS WITH HYPERGLYCEMIA (HCC): ICD-10-CM

## 2023-10-19 DIAGNOSIS — G89.29 CHRONIC BILATERAL LOW BACK PAIN WITH LEFT-SIDED SCIATICA: Primary | ICD-10-CM

## 2023-10-19 DIAGNOSIS — M54.42 CHRONIC BILATERAL LOW BACK PAIN WITH LEFT-SIDED SCIATICA: Primary | ICD-10-CM

## 2023-10-19 DIAGNOSIS — C91.11 CHRONIC LYMPHOCYTIC LEUKEMIA OF B-CELL TYPE IN REMISSION (HCC): ICD-10-CM

## 2023-10-19 DIAGNOSIS — Z79.4 TYPE 2 DIABETES MELLITUS WITH COMPLICATION, WITH LONG-TERM CURRENT USE OF INSULIN (HCC): ICD-10-CM

## 2023-10-19 DIAGNOSIS — I48.0 PAROXYSMAL ATRIAL FIBRILLATION (HCC): ICD-10-CM

## 2023-10-19 DIAGNOSIS — F41.0 PANIC DISORDER WITHOUT AGORAPHOBIA: ICD-10-CM

## 2023-10-19 DIAGNOSIS — D69.6 PLATELETS DECREASED (HCC): ICD-10-CM

## 2023-10-19 DIAGNOSIS — J44.9 CHRONIC OBSTRUCTIVE PULMONARY DISEASE, UNSPECIFIED COPD TYPE (HCC): ICD-10-CM

## 2023-10-19 DIAGNOSIS — N17.9 AKI (ACUTE KIDNEY INJURY) (HCC): ICD-10-CM

## 2023-10-19 DIAGNOSIS — E11.8 TYPE 2 DIABETES MELLITUS WITH COMPLICATION, WITH LONG-TERM CURRENT USE OF INSULIN (HCC): ICD-10-CM

## 2023-10-19 DIAGNOSIS — I48.20 CHRONIC A-FIB (HCC): ICD-10-CM

## 2023-10-19 LAB
ALBUMIN SERPL BCP-MCNC: 4.2 G/DL (ref 3.5–5)
ALP SERPL-CCNC: 56 U/L (ref 34–104)
ALT SERPL W P-5'-P-CCNC: 18 U/L (ref 7–52)
ANION GAP SERPL CALCULATED.3IONS-SCNC: 13 MMOL/L
AST SERPL W P-5'-P-CCNC: 16 U/L (ref 13–39)
BASOPHILS # BLD MANUAL: 0 THOUSAND/UL (ref 0–0.1)
BASOPHILS NFR MAR MANUAL: 0 % (ref 0–1)
BILIRUB SERPL-MCNC: 0.87 MG/DL (ref 0.2–1)
BUN SERPL-MCNC: 13 MG/DL (ref 5–25)
CALCIUM SERPL-MCNC: 10.5 MG/DL (ref 8.4–10.2)
CHLORIDE SERPL-SCNC: 95 MMOL/L (ref 96–108)
CHOLEST SERPL-MCNC: 82 MG/DL
CO2 SERPL-SCNC: 24 MMOL/L (ref 21–32)
CREAT SERPL-MCNC: 0.7 MG/DL (ref 0.6–1.3)
DIFFERENTIAL COMMENT: ABNORMAL
EOSINOPHIL # BLD MANUAL: 0 THOUSAND/UL (ref 0–0.4)
EOSINOPHIL NFR BLD MANUAL: 0 % (ref 0–6)
ERYTHROCYTE [DISTWIDTH] IN BLOOD BY AUTOMATED COUNT: 12.2 % (ref 11.6–15.1)
GFR SERPL CREATININE-BSD FRML MDRD: 99 ML/MIN/1.73SQ M
GLUCOSE SERPL-MCNC: 285 MG/DL (ref 65–140)
HCT VFR BLD AUTO: 44.4 % (ref 36.5–49.3)
HDLC SERPL-MCNC: 32 MG/DL
HGB BLD-MCNC: 15.4 G/DL (ref 12–17)
LDH SERPL-CCNC: 156 U/L (ref 140–271)
LDLC SERPL CALC-MCNC: 6 MG/DL (ref 0–100)
LYMPHOCYTES # BLD AUTO: 14.49 THOUSAND/UL (ref 0.6–4.47)
LYMPHOCYTES # BLD AUTO: 65 % (ref 14–44)
MCH RBC QN AUTO: 32.6 PG (ref 26.8–34.3)
MCHC RBC AUTO-ENTMCNC: 34.7 G/DL (ref 31.4–37.4)
MCV RBC AUTO: 94 FL (ref 82–98)
MONOCYTES # BLD AUTO: 0.41 THOUSAND/UL (ref 0–1.22)
MONOCYTES NFR BLD: 2 % (ref 4–12)
NEUTROPHILS # BLD MANUAL: 5.8 THOUSAND/UL (ref 1.85–7.62)
NEUTS SEG NFR BLD AUTO: 28 % (ref 43–75)
NONHDLC SERPL-MCNC: 50 MG/DL
PLATELET # BLD AUTO: 158 THOUSANDS/UL (ref 149–390)
PLATELET BLD QL SMEAR: ADEQUATE
PMV BLD AUTO: 10.1 FL (ref 8.9–12.7)
POTASSIUM SERPL-SCNC: 3.8 MMOL/L (ref 3.5–5.3)
PROT SERPL-MCNC: 6.7 G/DL (ref 6.4–8.4)
RBC # BLD AUTO: 4.72 MILLION/UL (ref 3.88–5.62)
RBC MORPH BLD: PRESENT
SMUDGE CELLS BLD QL SMEAR: PRESENT
SODIUM SERPL-SCNC: 132 MMOL/L (ref 135–147)
TRIGL SERPL-MCNC: 219 MG/DL
VARIANT LYMPHS # BLD AUTO: 5 %
WBC # BLD AUTO: 20.7 THOUSAND/UL (ref 4.31–10.16)

## 2023-10-19 PROCEDURE — 80061 LIPID PANEL: CPT

## 2023-10-19 PROCEDURE — 83615 LACTATE (LD) (LDH) ENZYME: CPT

## 2023-10-19 PROCEDURE — 83036 HEMOGLOBIN GLYCOSYLATED A1C: CPT

## 2023-10-19 PROCEDURE — 80053 COMPREHEN METABOLIC PANEL: CPT

## 2023-10-19 PROCEDURE — 85027 COMPLETE CBC AUTOMATED: CPT

## 2023-10-19 PROCEDURE — 85007 BL SMEAR W/DIFF WBC COUNT: CPT

## 2023-10-19 PROCEDURE — 36415 COLL VENOUS BLD VENIPUNCTURE: CPT

## 2023-10-19 RX ORDER — INSULIN GLARGINE 100 [IU]/ML
50 INJECTION, SOLUTION SUBCUTANEOUS 2 TIMES DAILY
Qty: 50 ML | Refills: 6 | Status: ON HOLD | OUTPATIENT
Start: 2023-10-19 | End: 2023-11-18

## 2023-10-19 NOTE — PROGRESS NOTES
Assessment/Plan:       Diagnoses and all orders for this visit:    Chronic bilateral low back pain with left-sided sciatica  -     Diclofenac Sodium (VOLTAREN) 1 %; Apply 2 g topically 4 (four) times a day for 14 days    Uncontrolled type 2 diabetes mellitus with hyperglycemia (HCC)  -     Insulin Glargine Solostar (Lantus SoloStar) 100 UNIT/ML SOPN; Inject 0.5 mL (50 Units total) under the skin 2 (two) times a day    Chronic a-fib (HCC)       -       mild a-fib today-rate 84-99    Chronic lymphocytic leukemia of B-cell type in remission [facility])       -       lab drawn for oncology          Subjective:      Patient ID: Jeniffer Lucas is a 58 y.o. male. Patient seen today at home for c/o lower back pain, abnormal insulin levels and blood draw. He is home bound due to extreme weakness on exertion and SOB on exertion. He has uncontrolled DM, chronic a-fib, and CLL. He has lost a significant amount of weight on observation. He no longer is followed by endocrinology because he cannot go to the office. Adriana Arora has atypical eating and sleeping hours. His average FBG for 7 days was 252. It is 280 for 14 days. He uses a free style kamran  for blood sugar monitoring. He is non compliant with diabetic diet. Will up long acting insulin to 50 units BID. Doubtful he takes insulin as ordered with meals on a regular basis. Will order voltaren gel for his lower back pain and he is on tramadol. He is followed by oncology for his CLL. He does not get treatment. > 40 minutes was spent with patient on detailed history, physical exam and assessment, planning and diagnosing, diabetic education and blood draw. The following portions of the patient's history were reviewed and updated as appropriate: allergies, current medications, past family history, past medical history, past social history, past surgical history, and problem list.    Review of Systems   Constitutional:  Positive for diaphoresis and fatigue.    HENT: Negative. Eyes: Negative. Respiratory:  Positive for shortness of breath. Cardiovascular: Negative. Gastrointestinal: Negative. Endocrine: Negative. Genitourinary: Negative. Musculoskeletal: Negative. Skin: Negative. Allergic/Immunologic: Negative. Neurological: Negative. Hematological: Negative. Psychiatric/Behavioral: Negative. Objective:      /90   Pulse 88   Temp (!) 97 °F (36.1 °C)   Resp 16   SpO2 95%          Physical Exam  Constitutional:       General: He is not in acute distress. Appearance: Normal appearance. He is not ill-appearing, toxic-appearing or diaphoretic. HENT:      Head: Normocephalic and atraumatic. Right Ear: External ear normal.      Left Ear: External ear normal.      Mouth/Throat:      Mouth: Mucous membranes are moist.      Pharynx: Oropharynx is clear. Eyes:      General:         Right eye: No discharge. Left eye: No discharge. Conjunctiva/sclera: Conjunctivae normal.   Cardiovascular:      Rate and Rhythm: Normal rate and regular rhythm. Pulses: Normal pulses. Heart sounds: No murmur heard. Pulmonary:      Effort: Pulmonary effort is normal. No respiratory distress. Breath sounds: No wheezing, rhonchi or rales. Abdominal:      General: Bowel sounds are normal.      Palpations: Abdomen is soft. Tenderness: There is no abdominal tenderness. There is no guarding. Musculoskeletal:         General: Normal range of motion. Cervical back: Normal range of motion. No rigidity. Right lower leg: No edema. Left lower leg: No edema. Skin:     General: Skin is warm and dry. Findings: Lesion present. No bruising. Comments: Scattered scabs on arms from picking   Neurological:      General: No focal deficit present. Mental Status: He is alert and oriented to person, place, and time.       Gait: Gait abnormal.   Psychiatric:         Mood and Affect: Mood normal. Behavior: Behavior normal.         Thought Content:  Thought content normal.         Judgment: Judgment normal.

## 2023-10-20 ENCOUNTER — TELEPHONE (OUTPATIENT)
Dept: FAMILY MEDICINE CLINIC | Facility: CLINIC | Age: 63
End: 2023-10-20

## 2023-10-20 VITALS
DIASTOLIC BLOOD PRESSURE: 90 MMHG | OXYGEN SATURATION: 95 % | SYSTOLIC BLOOD PRESSURE: 138 MMHG | HEART RATE: 88 BPM | RESPIRATION RATE: 16 BRPM | TEMPERATURE: 97 F

## 2023-10-20 DIAGNOSIS — E78.5 HYPERLIPIDEMIA, UNSPECIFIED HYPERLIPIDEMIA TYPE: ICD-10-CM

## 2023-10-20 DIAGNOSIS — E11.8 TYPE 2 DIABETES MELLITUS WITH COMPLICATION, WITH LONG-TERM CURRENT USE OF INSULIN (HCC): ICD-10-CM

## 2023-10-20 DIAGNOSIS — E87.8 ELECTROLYTE ABNORMALITY: ICD-10-CM

## 2023-10-20 DIAGNOSIS — Z79.4 TYPE 2 DIABETES MELLITUS WITH COMPLICATION, WITH LONG-TERM CURRENT USE OF INSULIN (HCC): ICD-10-CM

## 2023-10-20 DIAGNOSIS — R79.89 LOW SERUM LOW DENSITY LIPOPROTEIN (LDL): Primary | ICD-10-CM

## 2023-10-20 LAB
EST. AVERAGE GLUCOSE BLD GHB EST-MCNC: 235 MG/DL
HBA1C MFR BLD: 9.8 %

## 2023-10-20 PROCEDURE — 3046F HEMOGLOBIN A1C LEVEL >9.0%: CPT | Performed by: NURSE PRACTITIONER

## 2023-10-20 RX ORDER — FLASH GLUCOSE SENSOR
KIT MISCELLANEOUS
Qty: 6 EACH | Refills: 3 | Status: ON HOLD | OUTPATIENT
Start: 2023-10-20

## 2023-10-20 RX ORDER — POTASSIUM CHLORIDE 20 MEQ/1
20 TABLET, EXTENDED RELEASE ORAL DAILY
Qty: 30 TABLET | Refills: 6 | Status: ON HOLD | OUTPATIENT
Start: 2023-10-20 | End: 2023-11-19

## 2023-10-20 RX ORDER — ATORVASTATIN CALCIUM 10 MG/1
10 TABLET, FILM COATED ORAL DAILY
Qty: 30 TABLET | Refills: 4 | Status: ON HOLD | OUTPATIENT
Start: 2023-10-20 | End: 2023-11-19

## 2023-10-20 NOTE — PROGRESS NOTES
Atorvastatin reduced to 10mg po hs. Patient is diabetic with history HLD. Currently LDL only 6. Notified patient of change.

## 2023-10-22 ENCOUNTER — HOSPITAL ENCOUNTER (INPATIENT)
Facility: HOSPITAL | Age: 63
LOS: 3 days | Discharge: NURSING, REHAB OR LONG-TERM CARE | DRG: 191 | End: 2023-10-25
Attending: EMERGENCY MEDICINE | Admitting: FAMILY MEDICINE
Payer: COMMERCIAL

## 2023-10-22 ENCOUNTER — APPOINTMENT (EMERGENCY)
Dept: RADIOLOGY | Facility: HOSPITAL | Age: 63
DRG: 191 | End: 2023-10-22
Payer: COMMERCIAL

## 2023-10-22 DIAGNOSIS — G89.4 CHRONIC PAIN SYNDROME: ICD-10-CM

## 2023-10-22 DIAGNOSIS — E83.42 HYPOMAGNESEMIA: ICD-10-CM

## 2023-10-22 DIAGNOSIS — K59.00 CONSTIPATION: ICD-10-CM

## 2023-10-22 DIAGNOSIS — F99 PSYCHIATRIC DISORDER: ICD-10-CM

## 2023-10-22 DIAGNOSIS — I25.10 CORONARY ARTERY DISEASE INVOLVING NATIVE HEART WITHOUT ANGINA PECTORIS, UNSPECIFIED VESSEL OR LESION TYPE: ICD-10-CM

## 2023-10-22 DIAGNOSIS — C91.11 CHRONIC LYMPHOCYTIC LEUKEMIA OF B-CELL TYPE IN REMISSION (HCC): ICD-10-CM

## 2023-10-22 DIAGNOSIS — Z79.4 TYPE 2 DIABETES MELLITUS WITH COMPLICATION, WITH LONG-TERM CURRENT USE OF INSULIN (HCC): ICD-10-CM

## 2023-10-22 DIAGNOSIS — E11.8 TYPE 2 DIABETES MELLITUS WITH COMPLICATION, WITH LONG-TERM CURRENT USE OF INSULIN (HCC): ICD-10-CM

## 2023-10-22 DIAGNOSIS — J44.1 COPD WITH ACUTE EXACERBATION (HCC): Primary | ICD-10-CM

## 2023-10-22 DIAGNOSIS — J42 CHRONIC BRONCHITIS, UNSPECIFIED CHRONIC BRONCHITIS TYPE (HCC): ICD-10-CM

## 2023-10-22 PROBLEM — E87.8 ELECTROLYTE IMBALANCE: Status: ACTIVE | Noted: 2023-10-22

## 2023-10-22 LAB
2HR DELTA HS TROPONIN: -3 NG/L
4HR DELTA HS TROPONIN: -1 NG/L
ALBUMIN SERPL BCP-MCNC: 4.4 G/DL (ref 3.5–5)
ALP SERPL-CCNC: 56 U/L (ref 34–104)
ALT SERPL W P-5'-P-CCNC: 21 U/L (ref 7–52)
ANION GAP SERPL CALCULATED.3IONS-SCNC: 10 MMOL/L
AST SERPL W P-5'-P-CCNC: 24 U/L (ref 13–39)
BASOPHILS # BLD AUTO: 0.12 THOUSANDS/ÂΜL (ref 0–0.1)
BASOPHILS NFR BLD AUTO: 0 % (ref 0–1)
BILIRUB SERPL-MCNC: 1.18 MG/DL (ref 0.2–1)
BNP SERPL-MCNC: 161 PG/ML (ref 0–100)
BUN SERPL-MCNC: 19 MG/DL (ref 5–25)
CALCIUM SERPL-MCNC: 9.2 MG/DL (ref 8.4–10.2)
CARDIAC TROPONIN I PNL SERPL HS: 5 NG/L
CARDIAC TROPONIN I PNL SERPL HS: 7 NG/L
CARDIAC TROPONIN I PNL SERPL HS: 8 NG/L
CHLORIDE SERPL-SCNC: 93 MMOL/L (ref 96–108)
CO2 SERPL-SCNC: 27 MMOL/L (ref 21–32)
CREAT SERPL-MCNC: 0.8 MG/DL (ref 0.6–1.3)
EOSINOPHIL # BLD AUTO: 0.09 THOUSAND/ÂΜL (ref 0–0.61)
EOSINOPHIL NFR BLD AUTO: 0 % (ref 0–6)
ERYTHROCYTE [DISTWIDTH] IN BLOOD BY AUTOMATED COUNT: 12.2 % (ref 11.6–15.1)
FLUAV RNA RESP QL NAA+PROBE: NEGATIVE
FLUBV RNA RESP QL NAA+PROBE: NEGATIVE
GFR SERPL CREATININE-BSD FRML MDRD: 94 ML/MIN/1.73SQ M
GLUCOSE SERPL-MCNC: 191 MG/DL (ref 65–140)
GLUCOSE SERPL-MCNC: 194 MG/DL (ref 65–140)
GLUCOSE SERPL-MCNC: 358 MG/DL (ref 65–140)
HCT VFR BLD AUTO: 46 % (ref 36.5–49.3)
HGB BLD-MCNC: 16.3 G/DL (ref 12–17)
IMM GRANULOCYTES # BLD AUTO: 0.2 THOUSAND/UL (ref 0–0.2)
IMM GRANULOCYTES NFR BLD AUTO: 1 % (ref 0–2)
LYMPHOCYTES # BLD AUTO: 24.42 THOUSANDS/ÂΜL (ref 0.6–4.47)
LYMPHOCYTES NFR BLD AUTO: 60 % (ref 14–44)
MAGNESIUM SERPL-MCNC: 1.9 MG/DL (ref 1.9–2.7)
MCH RBC QN AUTO: 33.6 PG (ref 26.8–34.3)
MCHC RBC AUTO-ENTMCNC: 35.4 G/DL (ref 31.4–37.4)
MCV RBC AUTO: 95 FL (ref 82–98)
MONOCYTES # BLD AUTO: 1.27 THOUSAND/ÂΜL (ref 0.17–1.22)
MONOCYTES NFR BLD AUTO: 3 % (ref 4–12)
NEUTROPHILS # BLD AUTO: 14.3 THOUSANDS/ÂΜL (ref 1.85–7.62)
NEUTS SEG NFR BLD AUTO: 36 % (ref 43–75)
NRBC BLD AUTO-RTO: 0 /100 WBCS
PLATELET # BLD AUTO: 192 THOUSANDS/UL (ref 149–390)
PMV BLD AUTO: 9.6 FL (ref 8.9–12.7)
POTASSIUM SERPL-SCNC: 4.8 MMOL/L (ref 3.5–5.3)
PROCALCITONIN SERPL-MCNC: <0.05 NG/ML
PROT SERPL-MCNC: 6.7 G/DL (ref 6.4–8.4)
RBC # BLD AUTO: 4.85 MILLION/UL (ref 3.88–5.62)
RSV RNA RESP QL NAA+PROBE: NEGATIVE
SARS-COV-2 RNA RESP QL NAA+PROBE: NEGATIVE
SODIUM SERPL-SCNC: 130 MMOL/L (ref 135–147)
WBC # BLD AUTO: 40.4 THOUSAND/UL (ref 4.31–10.16)

## 2023-10-22 PROCEDURE — 99285 EMERGENCY DEPT VISIT HI MDM: CPT

## 2023-10-22 PROCEDURE — 84145 PROCALCITONIN (PCT): CPT | Performed by: EMERGENCY MEDICINE

## 2023-10-22 PROCEDURE — 85025 COMPLETE CBC W/AUTO DIFF WBC: CPT | Performed by: EMERGENCY MEDICINE

## 2023-10-22 PROCEDURE — 83880 ASSAY OF NATRIURETIC PEPTIDE: CPT | Performed by: EMERGENCY MEDICINE

## 2023-10-22 PROCEDURE — 94640 AIRWAY INHALATION TREATMENT: CPT

## 2023-10-22 PROCEDURE — 80053 COMPREHEN METABOLIC PANEL: CPT | Performed by: EMERGENCY MEDICINE

## 2023-10-22 PROCEDURE — 71045 X-RAY EXAM CHEST 1 VIEW: CPT

## 2023-10-22 PROCEDURE — 36415 COLL VENOUS BLD VENIPUNCTURE: CPT | Performed by: EMERGENCY MEDICINE

## 2023-10-22 PROCEDURE — 84484 ASSAY OF TROPONIN QUANT: CPT | Performed by: EMERGENCY MEDICINE

## 2023-10-22 PROCEDURE — 82948 REAGENT STRIP/BLOOD GLUCOSE: CPT

## 2023-10-22 PROCEDURE — 87449 NOS EACH ORGANISM AG IA: CPT

## 2023-10-22 PROCEDURE — 83735 ASSAY OF MAGNESIUM: CPT | Performed by: EMERGENCY MEDICINE

## 2023-10-22 PROCEDURE — 0241U HB NFCT DS VIR RESP RNA 4 TRGT: CPT | Performed by: EMERGENCY MEDICINE

## 2023-10-22 PROCEDURE — 96374 THER/PROPH/DIAG INJ IV PUSH: CPT

## 2023-10-22 PROCEDURE — 99285 EMERGENCY DEPT VISIT HI MDM: CPT | Performed by: EMERGENCY MEDICINE

## 2023-10-22 PROCEDURE — 87040 BLOOD CULTURE FOR BACTERIA: CPT

## 2023-10-22 PROCEDURE — 93005 ELECTROCARDIOGRAM TRACING: CPT

## 2023-10-22 PROCEDURE — 99223 1ST HOSP IP/OBS HIGH 75: CPT | Performed by: FAMILY MEDICINE

## 2023-10-22 RX ORDER — INSULIN LISPRO 100 [IU]/ML
4-20 INJECTION, SOLUTION INTRAVENOUS; SUBCUTANEOUS
Status: DISCONTINUED | OUTPATIENT
Start: 2023-10-22 | End: 2023-10-23

## 2023-10-22 RX ORDER — ASCORBIC ACID 500 MG
1000 TABLET ORAL DAILY
Status: DISCONTINUED | OUTPATIENT
Start: 2023-10-23 | End: 2023-10-25 | Stop reason: HOSPADM

## 2023-10-22 RX ORDER — INSULIN LISPRO 100 [IU]/ML
12 INJECTION, SOLUTION INTRAVENOUS; SUBCUTANEOUS
Status: DISCONTINUED | OUTPATIENT
Start: 2023-10-23 | End: 2023-10-23

## 2023-10-22 RX ORDER — METHYLPREDNISOLONE SODIUM SUCCINATE 40 MG/ML
40 INJECTION, POWDER, LYOPHILIZED, FOR SOLUTION INTRAMUSCULAR; INTRAVENOUS EVERY 8 HOURS
Status: DISCONTINUED | OUTPATIENT
Start: 2023-10-23 | End: 2023-10-24

## 2023-10-22 RX ORDER — SODIUM CHLORIDE 1 G/1
1 TABLET ORAL ONCE
Status: COMPLETED | OUTPATIENT
Start: 2023-10-22 | End: 2023-10-22

## 2023-10-22 RX ORDER — LOSARTAN POTASSIUM 50 MG/1
50 TABLET ORAL DAILY
Status: DISCONTINUED | OUTPATIENT
Start: 2023-10-23 | End: 2023-10-25 | Stop reason: HOSPADM

## 2023-10-22 RX ORDER — METHYLPREDNISOLONE SODIUM SUCCINATE 125 MG/2ML
60 INJECTION, POWDER, LYOPHILIZED, FOR SOLUTION INTRAMUSCULAR; INTRAVENOUS ONCE
Status: COMPLETED | OUTPATIENT
Start: 2023-10-22 | End: 2023-10-22

## 2023-10-22 RX ORDER — INSULIN GLARGINE 100 [IU]/ML
40 INJECTION, SOLUTION SUBCUTANEOUS
Status: DISCONTINUED | OUTPATIENT
Start: 2023-10-22 | End: 2023-10-23

## 2023-10-22 RX ORDER — GUAIFENESIN 600 MG/1
600 TABLET, EXTENDED RELEASE ORAL 2 TIMES DAILY
Status: DISCONTINUED | OUTPATIENT
Start: 2023-10-22 | End: 2023-10-25 | Stop reason: HOSPADM

## 2023-10-22 RX ORDER — ALBUTEROL SULFATE 90 UG/1
2 AEROSOL, METERED RESPIRATORY (INHALATION) EVERY 6 HOURS PRN
Status: CANCELLED | OUTPATIENT
Start: 2023-10-22

## 2023-10-22 RX ORDER — IPRATROPIUM BROMIDE AND ALBUTEROL SULFATE 2.5; .5 MG/3ML; MG/3ML
3 SOLUTION RESPIRATORY (INHALATION) ONCE
Status: COMPLETED | OUTPATIENT
Start: 2023-10-22 | End: 2023-10-22

## 2023-10-22 RX ORDER — FLUTICASONE FUROATE AND VILANTEROL 100; 25 UG/1; UG/1
1 POWDER RESPIRATORY (INHALATION) DAILY
Status: DISCONTINUED | OUTPATIENT
Start: 2023-10-23 | End: 2023-10-25 | Stop reason: HOSPADM

## 2023-10-22 RX ORDER — LAMOTRIGINE 25 MG/1
25 TABLET ORAL
Status: DISCONTINUED | OUTPATIENT
Start: 2023-10-22 | End: 2023-10-25 | Stop reason: HOSPADM

## 2023-10-22 RX ORDER — LANOLIN ALCOHOL/MO/W.PET/CERES
400 CREAM (GRAM) TOPICAL DAILY
Status: DISCONTINUED | OUTPATIENT
Start: 2023-10-23 | End: 2023-10-25 | Stop reason: HOSPADM

## 2023-10-22 RX ORDER — DOCUSATE SODIUM 100 MG/1
100 CAPSULE, LIQUID FILLED ORAL 2 TIMES DAILY
Status: DISCONTINUED | OUTPATIENT
Start: 2023-10-22 | End: 2023-10-25 | Stop reason: HOSPADM

## 2023-10-22 RX ORDER — POLYETHYLENE GLYCOL 3350 17 G/17G
17 POWDER, FOR SOLUTION ORAL DAILY PRN
Status: DISCONTINUED | OUTPATIENT
Start: 2023-10-22 | End: 2023-10-25 | Stop reason: HOSPADM

## 2023-10-22 RX ORDER — QUETIAPINE FUMARATE 100 MG/1
300 TABLET, FILM COATED ORAL
Status: DISCONTINUED | OUTPATIENT
Start: 2023-10-22 | End: 2023-10-23

## 2023-10-22 RX ORDER — QUETIAPINE FUMARATE 300 MG/1
300 TABLET, FILM COATED ORAL
COMMUNITY
End: 2023-10-25

## 2023-10-22 RX ORDER — SPIRONOLACTONE 25 MG/1
25 TABLET ORAL DAILY
Status: DISCONTINUED | OUTPATIENT
Start: 2023-10-23 | End: 2023-10-25 | Stop reason: HOSPADM

## 2023-10-22 RX ORDER — KETOROLAC TROMETHAMINE 30 MG/ML
15 INJECTION, SOLUTION INTRAMUSCULAR; INTRAVENOUS EVERY 8 HOURS PRN
Status: DISCONTINUED | OUTPATIENT
Start: 2023-10-22 | End: 2023-10-23

## 2023-10-22 RX ORDER — FLUTICASONE PROPIONATE 50 MCG
1 SPRAY, SUSPENSION (ML) NASAL DAILY
Status: DISCONTINUED | OUTPATIENT
Start: 2023-10-23 | End: 2023-10-25 | Stop reason: HOSPADM

## 2023-10-22 RX ORDER — ATORVASTATIN CALCIUM 10 MG/1
10 TABLET, FILM COATED ORAL
Status: DISCONTINUED | OUTPATIENT
Start: 2023-10-23 | End: 2023-10-25 | Stop reason: HOSPADM

## 2023-10-22 RX ORDER — POTASSIUM CHLORIDE 20 MEQ/1
20 TABLET, EXTENDED RELEASE ORAL DAILY
Status: DISCONTINUED | OUTPATIENT
Start: 2023-10-23 | End: 2023-10-22

## 2023-10-22 RX ORDER — QUETIAPINE FUMARATE 25 MG/1
100 TABLET, FILM COATED ORAL EVERY MORNING
Status: DISCONTINUED | OUTPATIENT
Start: 2023-10-23 | End: 2023-10-25 | Stop reason: HOSPADM

## 2023-10-22 RX ORDER — LANOLIN ALCOHOL/MO/W.PET/CERES
400 CREAM (GRAM) TOPICAL 2 TIMES DAILY
Status: DISCONTINUED | OUTPATIENT
Start: 2023-10-23 | End: 2023-10-22

## 2023-10-22 RX ORDER — KETOROLAC TROMETHAMINE 30 MG/ML
15 INJECTION, SOLUTION INTRAMUSCULAR; INTRAVENOUS EVERY 8 HOURS PRN
Status: DISCONTINUED | OUTPATIENT
Start: 2023-10-22 | End: 2023-10-22

## 2023-10-22 RX ORDER — METOPROLOL SUCCINATE 50 MG/1
200 TABLET, EXTENDED RELEASE ORAL DAILY
Status: DISCONTINUED | OUTPATIENT
Start: 2023-10-23 | End: 2023-10-25 | Stop reason: HOSPADM

## 2023-10-22 RX ORDER — ASPIRIN 81 MG/1
81 TABLET, CHEWABLE ORAL ONCE
Status: COMPLETED | OUTPATIENT
Start: 2023-10-22 | End: 2023-10-22

## 2023-10-22 RX ORDER — DIGOXIN 125 MCG
250 TABLET ORAL DAILY
Status: DISCONTINUED | OUTPATIENT
Start: 2023-10-23 | End: 2023-10-25 | Stop reason: HOSPADM

## 2023-10-22 RX ORDER — PANTOPRAZOLE SODIUM 20 MG/1
20 TABLET, DELAYED RELEASE ORAL DAILY
Status: DISCONTINUED | OUTPATIENT
Start: 2023-10-22 | End: 2023-10-25 | Stop reason: HOSPADM

## 2023-10-22 RX ORDER — ALBUTEROL SULFATE 2.5 MG/3ML
2.5 SOLUTION RESPIRATORY (INHALATION) EVERY 6 HOURS PRN
Status: DISCONTINUED | OUTPATIENT
Start: 2023-10-22 | End: 2023-10-25 | Stop reason: HOSPADM

## 2023-10-22 RX ORDER — MELATONIN
1000 DAILY
Status: DISCONTINUED | OUTPATIENT
Start: 2023-10-23 | End: 2023-10-25 | Stop reason: HOSPADM

## 2023-10-22 RX ORDER — ACETAMINOPHEN 325 MG/1
650 TABLET ORAL EVERY 6 HOURS SCHEDULED
Status: DISCONTINUED | OUTPATIENT
Start: 2023-10-22 | End: 2023-10-25 | Stop reason: HOSPADM

## 2023-10-22 RX ORDER — IPRATROPIUM BROMIDE AND ALBUTEROL SULFATE 2.5; .5 MG/3ML; MG/3ML
3 SOLUTION RESPIRATORY (INHALATION)
Status: DISCONTINUED | OUTPATIENT
Start: 2023-10-23 | End: 2023-10-25 | Stop reason: HOSPADM

## 2023-10-22 RX ORDER — BUSPIRONE HYDROCHLORIDE 5 MG/1
10 TABLET ORAL 2 TIMES DAILY
Status: DISCONTINUED | OUTPATIENT
Start: 2023-10-22 | End: 2023-10-25 | Stop reason: HOSPADM

## 2023-10-22 RX ORDER — GABAPENTIN 400 MG/1
800 CAPSULE ORAL 4 TIMES DAILY
Status: DISCONTINUED | OUTPATIENT
Start: 2023-10-22 | End: 2023-10-25 | Stop reason: HOSPADM

## 2023-10-22 RX ADMIN — ACETAMINOPHEN 650 MG: 325 TABLET ORAL at 22:17

## 2023-10-22 RX ADMIN — AZITHROMYCIN MONOHYDRATE 500 MG: 500 INJECTION, POWDER, LYOPHILIZED, FOR SOLUTION INTRAVENOUS at 22:19

## 2023-10-22 RX ADMIN — DOCUSATE SODIUM 100 MG: 100 CAPSULE, LIQUID FILLED ORAL at 22:17

## 2023-10-22 RX ADMIN — SODIUM CHLORIDE 1 G: 1 TABLET ORAL at 22:16

## 2023-10-22 RX ADMIN — PANTOPRAZOLE SODIUM 20 MG: 20 TABLET, DELAYED RELEASE ORAL at 22:18

## 2023-10-22 RX ADMIN — INSULIN LISPRO 16 UNITS: 100 INJECTION, SOLUTION INTRAVENOUS; SUBCUTANEOUS at 22:12

## 2023-10-22 RX ADMIN — LAMOTRIGINE 25 MG: 25 TABLET ORAL at 22:18

## 2023-10-22 RX ADMIN — IPRATROPIUM BROMIDE AND ALBUTEROL SULFATE 3 ML: .5; 3 SOLUTION RESPIRATORY (INHALATION) at 16:36

## 2023-10-22 RX ADMIN — GUAIFENESIN 600 MG: 600 TABLET, EXTENDED RELEASE ORAL at 22:16

## 2023-10-22 RX ADMIN — GABAPENTIN 800 MG: 400 CAPSULE ORAL at 22:17

## 2023-10-22 RX ADMIN — APIXABAN 5 MG: 5 TABLET, FILM COATED ORAL at 22:18

## 2023-10-22 RX ADMIN — INSULIN GLARGINE 40 UNITS: 100 INJECTION, SOLUTION SUBCUTANEOUS at 22:12

## 2023-10-22 RX ADMIN — ASPIRIN 81 MG CHEWABLE TABLET 81 MG: 81 TABLET CHEWABLE at 22:16

## 2023-10-22 RX ADMIN — DICLOFENAC SODIUM 2 G: 10 GEL TOPICAL at 22:19

## 2023-10-22 RX ADMIN — QUETIAPINE FUMARATE 300 MG: 100 TABLET ORAL at 22:18

## 2023-10-22 RX ADMIN — KETOROLAC TROMETHAMINE 15 MG: 30 INJECTION, SOLUTION INTRAMUSCULAR at 23:18

## 2023-10-22 RX ADMIN — BUSPIRONE HYDROCHLORIDE 10 MG: 5 TABLET ORAL at 22:18

## 2023-10-22 RX ADMIN — METHYLPREDNISOLONE SODIUM SUCCINATE 60 MG: 125 INJECTION, POWDER, FOR SOLUTION INTRAMUSCULAR; INTRAVENOUS at 17:08

## 2023-10-22 NOTE — Clinical Note
Case was discussed with Dr. Sandy Escobar and the patient's admission status was agreed to be Admission Status: observation status to the service of Dr. Sandy Escobar .

## 2023-10-22 NOTE — ED NOTES
Box lunch provided ok by accepting resident Dr. Traci Felipe. Assessment done and med reconciliation by her as well.      Nirmal Mack, RN  10/22/23 7117

## 2023-10-22 NOTE — ED PROVIDER NOTES
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 do you have on a typical day you are drinking? 0 Filed at: 10/22/2023 1619   3a. Male UNDER 65: How often do you have five or more drinks on one occasion? 0 Filed at: 10/22/2023 1619   3b. FEMALE Any Age, or MALE 65+: How often do you have 4 or more drinks on one occassion? 0 Filed at: 10/22/2023 1619   Audit-C Score 0 Filed at: 10/22/2023 1619   AKASH: How many times in the past year have you. .. Used an illegal drug or used a prescription medication for non-medical reasons? Never Filed at: 10/22/2023 1619                      Medical Decision Making  Pulse ox 98% on room air indicating adequate oxygenation. CXR: NAD as read by me      Differential diagnosis include but not limited to arrhythmia, ACS, CHF, COPD, pneumonia, COVID-19, anemia    Wheezing improved after nebulizer treatments in the ER but patient still feeling short of breath and fatigue. White count significantly elevated from 1 month ago does have underlying CLL along with the sweats and fatigue may be worsening of his malignancy. Will admit for COPD exacerbation. Case discussed with teaching attending on call Dr. Marilyn Clancy for admission to the [facility] service. Amount and/or Complexity of Data Reviewed  Labs: ordered.   Radiology: ordered and independent interpretation performed. ECG/medicine tests: ordered and independent interpretation performed. Risk  Prescription drug management. Decision regarding hospitalization. Disposition  Final diagnoses:   COPD with acute exacerbation ([address])     Time reflects when diagnosis was documented in both MDM as applicable and the Disposition within this note       Time User Action Codes Description Comment    10/22/2023  6:22 PM Shelley Adams [place] Add [J44.1] COPD with acute exacerbation ([address])     10/22/2023  7:28 PM Sarah Conn [place] Add [C91.11] Chronic lymphocytic leukemia of B-cell type in remission [facility])           ED Disposition       ED Disposition   Admit    Condition   Stable    Date/Time   Sun Oct 22, 2023 0736    Comment   Case was discussed with Dr. Benito Uribe and the patient's admission status was agreed to be Admission Status: inpatient status to the service of Dr. Benito Uribe . Follow-up Information    None         Current Discharge Medication List        CONTINUE these medications which have NOT CHANGED    Details   acetaminophen (TYLENOL) 325 mg tablet Take 2 tablets (650 mg total) by mouth every 6 (six) hours as needed for mild pain, headaches or fever  Qty: 30 tablet, Refills: 0    Comments: Available over the counter  Associated Diagnoses: Community acquired pneumonia      albuterol (2.5 mg/3 mL) 0.083 % nebulizer solution TAKE 1 VIAL (2.5 MG TOTAL) BY NEBULIZATION EVERY 6 (SIX) HOURS AS NEEDED FOR WHEEZING  Qty: 375 mL, Refills: 5    Associated Diagnoses: Chronic obstructive pulmonary disease, unspecified COPD type (HCC)      albuterol (Ventolin HFA) 90 mcg/act inhaler Inhale 2 puffs every 6 (six) hours as needed for wheezing  Qty: 18 g, Refills: 5    Comments: Substitution to a formulary equivalent within the same pharmaceutical class is authorized.   Associated Diagnoses: Simple chronic bronchitis (HCC)      Alcohol Swabs (Alcohol Prep) 70 % PADS Apply topically 4 (four) times a day As directed  Qty: 100 each, Refills: 0    Associated Diagnoses: Type 2 diabetes mellitus with hyperglycemia, with long-term current use of insulin (HCC)      apixaban (Eliquis) 5 mg Take 1 tablet (5 mg total) by mouth 2 (two) times a day  Qty: 180 tablet, Refills: 3    Associated Diagnoses: Atrial fibrillation with RVR (HCC)      Ascorbic Acid (VITAMIN C) 1000 MG tablet Take 1,000 mg by mouth daily      aspirin 81 MG tablet Take 81 mg by mouth daily      atorvastatin (LIPITOR) 10 mg tablet Take 1 tablet (10 mg total) by mouth daily  Qty: 30 tablet, Refills: 4    Associated Diagnoses: Type 2 diabetes mellitus with complication, with long-term current use of insulin ([address]); Low serum low density lipoprotein (LDL); Hyperlipidemia, unspecified hyperlipidemia type      busPIRone (BUSPAR) 10 mg tablet TAKE ONE TABLET BY MOUTH TWICE DAILY  Qty: 60 tablet, Refills: 0    Associated Diagnoses: Psychiatric disorder      !! [place] Choice Comfort EZ 33G X 4 MM MISC USE TO INJECT INSULIN 5 TIMES A DAY      Diclofenac Sodium (VOLTAREN) 1 % Apply 2 g topically 4 (four) times a day for 14 days  Qty: 112 g, Refills: 6    Associated Diagnoses: Chronic bilateral low back pain with left-sided sciatica      digoxin (LANOXIN) 0.25 mg tablet Take 1 tablet (250 mcg total) by mouth daily  Qty: 90 tablet, Refills: 3    Associated Diagnoses: Atrial fibrillation with RVR (HCC)      fluticasone (FLONASE) 50 mcg/act nasal spray 1 spray into each nostril daily Do not start before May 12, 2023. Qty: 11.1 mL, Refills: 2    Associated Diagnoses: Allergy, initial encounter      fluticasone-umeclidinium-vilanterol (TRELEGY) 100-62.5-25 MCG/INH inhaler Inhale 1 puff daily Rinse mouth after use.   Qty: 1 each, Refills: 11    Associated Diagnoses: Centrilobular emphysema (HCC)      gabapentin (Neurontin) 800 mg tablet Take 1 tablet (800 mg total) by mouth 4 (four) times a day  Qty: 120 tablet, Refills: 0    Associated Diagnoses: Peripheral polyneuropathy Icosapent Ethyl 1 g CAPS TAKE 2 CAPSULES TWICE A DAY  Qty: 120 capsule, Refills: 6    Comments: PATIENT IS REQUESTING REFILLS. THANKS! Associated Diagnoses: Dyslipidemia      Insulin Glargine Solostar (Lantus SoloStar) 100 UNIT/ML SOPN Inject 0.5 mL (50 Units total) under the skin 2 (two) times a day  Qty: 50 mL, Refills: 6    Associated Diagnoses: Uncontrolled type 2 diabetes mellitus with hyperglycemia (HCC)      insulin lispro (HumaLOG KwikPen) 100 units/mL injection pen Inject 15 Units under the skin 3 (three) times a day with meals  Qty: 15 mL, Refills: 3    Associated Diagnoses: Type 2 diabetes mellitus with complication, with long-term current use of insulin ([address])      ! !  Insulin Pen Needle ([place] Choice Comfort EZ) 33G X 4 MM MISC USE TO INJECT INSULIN 5 TIMES A DAY  Qty: 500 each, Refills: 1    Associated Diagnoses: Type 2 diabetes mellitus with complication, with long-term current use of insulin ([facility])      lamoTRIgine (LaMICtal) 25 mg tablet 25 mg daily at bedtime      losartan (COZAAR) 50 mg tablet Take 1 tablet (50 mg total) by mouth daily  Qty: 90 tablet, Refills: 3    Associated Diagnoses: Chronic systolic heart failure (HCC)      magnesium (MAGTAB) 84 MG (7MEQ) TBCR Take 1 tablet (84 mg total) by mouth daily  Qty: 30 tablet, Refills: 6    Associated Diagnoses: Hypomagnesemia      metFORMIN (GLUCOPHAGE) 1000 MG tablet Take 1 tablet (1,000 mg total) by mouth 2 (two) times a day with meals  Qty: 60 tablet, Refills: 6    Associated Diagnoses: Type 2 diabetes mellitus with complication, with long-term current use of insulin (HCC)      metoprolol succinate (TOPROL-XL) 200 MG 24 hr tablet Take 1 tablet (200 mg total) by mouth daily  Qty: 90 tablet, Refills: 6    Associated Diagnoses: Coronary artery disease involving native heart without angina pectoris, unspecified vessel or lesion type      Multiple Vitamins-Minerals (CENTRUM SILVER 50+MEN PO) Take by mouth      omeprazole (PriLOSEC) 20 mg delayed release capsule Take 1 capsule (20 mg total) by mouth daily  Qty: 90 capsule, Refills: 3    Associated Diagnoses: Gastroesophageal reflux disease without esophagitis      pantoprazole (PROTONIX) 40 mg tablet TAKE 1 TABLET DAILY  Qty: 30 tablet, Refills: 1    Associated Diagnoses: Gastroesophageal reflux disease with esophagitis without hemorrhage      potassium chloride (K-DUR,KLOR-CON) 20 mEq tablet Take 1 tablet (20 mEq total) by mouth daily  Qty: 30 tablet, Refills: 6    Associated Diagnoses: Electrolyte abnormality      predniSONE 20 mg tablet 2 tabs daily x 4 days, 1 1/2 tab daily x 4 days, 1 tab daily x 4 days then 1/2 tab daily x 4 day.s  Qty: 30 tablet, Refills: 0    Comments: Extra tabs. Associated Diagnoses: COPD exacerbation ([address])      !! QUEtiapine (SEROquel) 100 mg tablet Take 1 tablet (100 mg total) by mouth daily at bedtime  Qty: 30 tablet, Refills: 6    Associated Diagnoses: Psychiatric disorder      !! QUEtiapine (SEROquel) 300 mg tablet Take 1 tablet (300 mg total) by mouth daily at bedtime  Qty: 30 tablet, Refills: 0    Associated Diagnoses: Psychiatric disorder      !!  QUEtiapine (SEROquel) 300 mg tablet Take 300 mg by mouth daily at bedtime      sertraline (ZOLOFT) 50 mg tablet Take 1 tablet (50 mg total) by mouth daily Daily at bedtime  Qty: 30 tablet, Refills: 0    Associated Diagnoses: Psychiatric disorder      sodium chloride 1 g tablet TAKE 1 TABLET DAILY IN THE MORNING  Qty: 30 tablet, Refills: 3    Associated Diagnoses: Hyponatremia      spironolactone (ALDACTONE) 25 mg tablet Take 1 tablet (25 mg total) by mouth daily  Qty: 90 tablet, Refills: 1    Associated Diagnoses: Chronic diastolic (congestive) heart failure (HCC)      tiZANidine (ZANAFLEX) 4 mg tablet Take 1 tablet (4 mg total) by mouth every 8 (eight) hours as needed for muscle spasms  Qty: 75 tablet, Refills: 0    Associated Diagnoses: Myofascial pain syndrome      traMADol (ULTRAM) 50 mg tablet Take 1 tablet (50 mg total) by mouth every 6 (six) hours as needed for moderate pain  Qty: 120 tablet, Refills: 1    Associated Diagnoses: Severe pain      Victoza injection INJECT 0.3ML UNDER THE SKIN EVERY MORNING  Qty: 9 mL, Refills: 0    Associated Diagnoses: Uncontrolled type 2 diabetes mellitus with hyperglycemia ([address])      ! ! B-D ULTRAFINE III SHORT PEN 31G X 8 MM MISC Use as directed      Blood Glucose Monitoring Suppl (OneTouch Verio Flex System) w/Device KIT       Chest Congestion Relief DM  MG/5ML oral syrup TAKE 5 ML BY MOUTH 3 (THREE) TIMES A DAY AS NEEDED FOR COUGH OR CONGESTION FOR UP TO 10 DAYS  Qty: 237 mL, Refills: 1    Associated Diagnoses: Simple chronic bronchitis (HCC)      cholecalciferol (VITAMIN D3) 1,000 units tablet Take 1 tablet (1,000 Units total) by mouth daily  Qty: 90 tablet, Refills: 3    Associated Diagnoses: H/O vitamin D deficiency      !! Continuous Blood Gluc Sensor (FreeStyle [place]ba 14 Day Sensor) MISC Use 1 application. every 14 (fourteen) days  Qty: 6 each, Refills: 0    Associated Diagnoses: Type 2 diabetes mellitus with hyperglycemia, with long-term current use of insulin ([address])      ! ! Continuous Blood Gluc Sensor (FreeStyle Sheba 14 Day Sensor) MISC Use as directed-  Qty: 6 each, Refills: 3    Associated Diagnoses: Type 2 diabetes mellitus with complication, with long-term current use of insulin ([address])      ! !  Continuous Blood Gluc Sensor (FreeStyle Sheba 2 Sensor) MISC Check blood sugars multiple times per day  Qty: 6 each, Refills: 3    Associated Diagnoses: Type 2 diabetes mellitus with complication, with long-term current use of insulin (HCC)      glucose blood (ReliOn True Metrix Test Strips) test strip Use as instructed  Qty: 25 each, Refills: 0    Associated Diagnoses: Uncontrolled type 2 diabetes mellitus with hyperglycemia (HCC)      Lancet Devices (Adjustable Lancing Device) MISC USE AS DIRECTED  Qty: 1 each, Refills: 0    Associated Diagnoses: Type 2 diabetes mellitus with complication, with long-term current use of insulin (HCC)      Lancets (onetouch ultrasoft) lancets test blood sugar 3 (three) times a day  Qty: 300 each, Refills: 3    Associated Diagnoses: Type 2 diabetes mellitus with complication, with long-term current use of insulin (HCC)      polyethylene glycol (GOLYTELY) 4000 mL solution Take 4,000 mL by mouth once for 1 dose  Qty: 4000 mL, Refills: 0    Associated Diagnoses: Change in bowel habits      !! Unifine SafeControl Pen Needle 30G X 5 MM MISC        !! - Potential duplicate medications found. Please discuss with provider. No discharge procedures on file.     PDMP Review         Value Time User    PDMP Reviewed  Yes 4/27/2023  8:29 AM Alea Soriano, [address] [place] [address]            ED Provider  Electronically Signed by             Iron Mabry DO  10/22/23 3083
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
## 2023-10-22 NOTE — ASSESSMENT & PLAN NOTE
Improving. POA leukocytosis and tachypnea. No known source of infection. WBC 34.79, elevated likely due to his underlying CLL.  Trended down from 40 at [facility]   Completed Azithromycin 500 mg IV (10/22-10/24)  BC negative

## 2023-10-23 ENCOUNTER — APPOINTMENT (INPATIENT)
Dept: RADIOLOGY | Facility: HOSPITAL | Age: 63
DRG: 191 | End: 2023-10-23
Payer: COMMERCIAL

## 2023-10-23 LAB
ALBUMIN SERPL BCP-MCNC: 4.3 G/DL (ref 3.5–5)
ALP SERPL-CCNC: 52 U/L (ref 34–104)
ALT SERPL W P-5'-P-CCNC: 18 U/L (ref 7–52)
ANION GAP SERPL CALCULATED.3IONS-SCNC: 9 MMOL/L
AST SERPL W P-5'-P-CCNC: 13 U/L (ref 13–39)
BASOPHILS # BLD MANUAL: 0.34 THOUSAND/UL (ref 0–0.1)
BASOPHILS NFR MAR MANUAL: 1 % (ref 0–1)
BILIRUB SERPL-MCNC: 0.91 MG/DL (ref 0.2–1)
BUN SERPL-MCNC: 24 MG/DL (ref 5–25)
CALCIUM SERPL-MCNC: 9 MG/DL (ref 8.4–10.2)
CHLORIDE SERPL-SCNC: 93 MMOL/L (ref 96–108)
CO2 SERPL-SCNC: 28 MMOL/L (ref 21–32)
CREAT SERPL-MCNC: 0.9 MG/DL (ref 0.6–1.3)
DIGOXIN SERPL-MCNC: 1 NG/ML (ref 0.8–2)
EOSINOPHIL # BLD MANUAL: 0 THOUSAND/UL (ref 0–0.4)
EOSINOPHIL NFR BLD MANUAL: 0 % (ref 0–6)
ERYTHROCYTE [DISTWIDTH] IN BLOOD BY AUTOMATED COUNT: 12.1 % (ref 11.6–15.1)
GFR SERPL CREATININE-BSD FRML MDRD: 89 ML/MIN/1.73SQ M
GLUCOSE SERPL-MCNC: 229 MG/DL (ref 65–140)
GLUCOSE SERPL-MCNC: 233 MG/DL (ref 65–140)
GLUCOSE SERPL-MCNC: 249 MG/DL (ref 65–140)
GLUCOSE SERPL-MCNC: 251 MG/DL (ref 65–140)
GLUCOSE SERPL-MCNC: 378 MG/DL (ref 65–140)
HCT VFR BLD AUTO: 44.1 % (ref 36.5–49.3)
HGB BLD-MCNC: 15.3 G/DL (ref 12–17)
L PNEUMO1 AG UR QL IA.RAPID: NEGATIVE
LYMPHOCYTES # BLD AUTO: 15.94 THOUSAND/UL (ref 0.6–4.47)
LYMPHOCYTES # BLD AUTO: 47 % (ref 14–44)
MAGNESIUM SERPL-MCNC: 2 MG/DL (ref 1.9–2.7)
MCH RBC QN AUTO: 32.8 PG (ref 26.8–34.3)
MCHC RBC AUTO-ENTMCNC: 34.7 G/DL (ref 31.4–37.4)
MCV RBC AUTO: 94 FL (ref 82–98)
MONOCYTES # BLD AUTO: 1.7 THOUSAND/UL (ref 0–1.22)
MONOCYTES NFR BLD: 5 % (ref 4–12)
NEUTROPHILS # BLD MANUAL: 15.94 THOUSAND/UL (ref 1.85–7.62)
NEUTS BAND NFR BLD MANUAL: 4 % (ref 0–8)
NEUTS SEG NFR BLD AUTO: 43 % (ref 43–75)
PLATELET # BLD AUTO: 199 THOUSANDS/UL (ref 149–390)
PLATELET BLD QL SMEAR: ADEQUATE
PMV BLD AUTO: 9.5 FL (ref 8.9–12.7)
POTASSIUM SERPL-SCNC: 5 MMOL/L (ref 3.5–5.3)
PROT SERPL-MCNC: 6.5 G/DL (ref 6.4–8.4)
RBC # BLD AUTO: 4.67 MILLION/UL (ref 3.88–5.62)
RBC MORPH BLD: NORMAL
S PNEUM AG UR QL: NEGATIVE
SODIUM SERPL-SCNC: 130 MMOL/L (ref 135–147)
WBC # BLD AUTO: 33.91 THOUSAND/UL (ref 4.31–10.16)

## 2023-10-23 PROCEDURE — 85007 BL SMEAR W/DIFF WBC COUNT: CPT

## 2023-10-23 PROCEDURE — 94640 AIRWAY INHALATION TREATMENT: CPT

## 2023-10-23 PROCEDURE — 94760 N-INVAS EAR/PLS OXIMETRY 1: CPT

## 2023-10-23 PROCEDURE — 94660 CPAP INITIATION&MGMT: CPT

## 2023-10-23 PROCEDURE — 80162 ASSAY OF DIGOXIN TOTAL: CPT

## 2023-10-23 PROCEDURE — 83735 ASSAY OF MAGNESIUM: CPT

## 2023-10-23 PROCEDURE — 99223 1ST HOSP IP/OBS HIGH 75: CPT | Performed by: INTERNAL MEDICINE

## 2023-10-23 PROCEDURE — 71260 CT THORAX DX C+: CPT

## 2023-10-23 PROCEDURE — 82948 REAGENT STRIP/BLOOD GLUCOSE: CPT

## 2023-10-23 PROCEDURE — 80053 COMPREHEN METABOLIC PANEL: CPT

## 2023-10-23 PROCEDURE — G1004 CDSM NDSC: HCPCS

## 2023-10-23 PROCEDURE — 85027 COMPLETE CBC AUTOMATED: CPT

## 2023-10-23 RX ORDER — INSULIN LISPRO 100 [IU]/ML
15 INJECTION, SOLUTION INTRAVENOUS; SUBCUTANEOUS
Status: DISCONTINUED | OUTPATIENT
Start: 2023-10-23 | End: 2023-10-25 | Stop reason: HOSPADM

## 2023-10-23 RX ORDER — QUETIAPINE FUMARATE 100 MG/1
300 TABLET, FILM COATED ORAL
Status: DISCONTINUED | OUTPATIENT
Start: 2023-10-23 | End: 2023-10-25 | Stop reason: HOSPADM

## 2023-10-23 RX ORDER — INSULIN GLARGINE 100 [IU]/ML
50 INJECTION, SOLUTION SUBCUTANEOUS
Status: DISCONTINUED | OUTPATIENT
Start: 2023-10-23 | End: 2023-10-24

## 2023-10-23 RX ORDER — TIZANIDINE 2 MG/1
4 TABLET ORAL EVERY 8 HOURS PRN
Status: DISCONTINUED | OUTPATIENT
Start: 2023-10-23 | End: 2023-10-25 | Stop reason: HOSPADM

## 2023-10-23 RX ORDER — INSULIN LISPRO 100 [IU]/ML
15 INJECTION, SOLUTION INTRAVENOUS; SUBCUTANEOUS
Status: DISCONTINUED | OUTPATIENT
Start: 2023-10-24 | End: 2023-10-25 | Stop reason: HOSPADM

## 2023-10-23 RX ORDER — FUROSEMIDE 10 MG/ML
20 INJECTION INTRAMUSCULAR; INTRAVENOUS ONCE
Status: COMPLETED | OUTPATIENT
Start: 2023-10-23 | End: 2023-10-23

## 2023-10-23 RX ORDER — INSULIN GLARGINE 100 [IU]/ML
50 INJECTION, SOLUTION SUBCUTANEOUS EVERY MORNING
Status: DISCONTINUED | OUTPATIENT
Start: 2023-10-23 | End: 2023-10-24

## 2023-10-23 RX ORDER — TRAMADOL HYDROCHLORIDE 50 MG/1
50 TABLET ORAL EVERY 6 HOURS PRN
Status: DISCONTINUED | OUTPATIENT
Start: 2023-10-23 | End: 2023-10-23

## 2023-10-23 RX ORDER — INSULIN LISPRO 100 [IU]/ML
1-6 INJECTION, SOLUTION INTRAVENOUS; SUBCUTANEOUS
Status: DISCONTINUED | OUTPATIENT
Start: 2023-10-23 | End: 2023-10-25 | Stop reason: HOSPADM

## 2023-10-23 RX ORDER — TRAMADOL HYDROCHLORIDE 50 MG/1
50 TABLET ORAL EVERY 6 HOURS PRN
Status: DISCONTINUED | OUTPATIENT
Start: 2023-10-23 | End: 2023-10-25 | Stop reason: HOSPADM

## 2023-10-23 RX ADMIN — GABAPENTIN 800 MG: 400 CAPSULE ORAL at 12:14

## 2023-10-23 RX ADMIN — DOCUSATE SODIUM 100 MG: 100 CAPSULE, LIQUID FILLED ORAL at 10:12

## 2023-10-23 RX ADMIN — FUROSEMIDE 20 MG: 10 INJECTION, SOLUTION INTRAMUSCULAR; INTRAVENOUS at 15:21

## 2023-10-23 RX ADMIN — GUAIFENESIN 600 MG: 600 TABLET, EXTENDED RELEASE ORAL at 17:31

## 2023-10-23 RX ADMIN — GABAPENTIN 800 MG: 400 CAPSULE ORAL at 17:31

## 2023-10-23 RX ADMIN — FLUTICASONE PROPIONATE 1 SPRAY: 50 SPRAY, METERED NASAL at 10:24

## 2023-10-23 RX ADMIN — IPRATROPIUM BROMIDE AND ALBUTEROL SULFATE 3 ML: 2.5; .5 SOLUTION RESPIRATORY (INHALATION) at 13:36

## 2023-10-23 RX ADMIN — TRAMADOL HYDROCHLORIDE 50 MG: 50 TABLET, COATED ORAL at 19:16

## 2023-10-23 RX ADMIN — GABAPENTIN 800 MG: 400 CAPSULE ORAL at 21:00

## 2023-10-23 RX ADMIN — METHYLPREDNISOLONE SODIUM SUCCINATE 40 MG: 40 INJECTION, POWDER, FOR SOLUTION INTRAMUSCULAR; INTRAVENOUS at 01:02

## 2023-10-23 RX ADMIN — INSULIN LISPRO 8 UNITS: 100 INJECTION, SOLUTION INTRAVENOUS; SUBCUTANEOUS at 07:50

## 2023-10-23 RX ADMIN — IPRATROPIUM BROMIDE AND ALBUTEROL SULFATE 3 ML: 2.5; .5 SOLUTION RESPIRATORY (INHALATION) at 01:00

## 2023-10-23 RX ADMIN — AZITHROMYCIN MONOHYDRATE 500 MG: 500 INJECTION, POWDER, LYOPHILIZED, FOR SOLUTION INTRAVENOUS at 21:12

## 2023-10-23 RX ADMIN — INSULIN LISPRO 15 UNITS: 100 INJECTION, SOLUTION INTRAVENOUS; SUBCUTANEOUS at 15:39

## 2023-10-23 RX ADMIN — INSULIN LISPRO 15 UNITS: 100 INJECTION, SOLUTION INTRAVENOUS; SUBCUTANEOUS at 12:15

## 2023-10-23 RX ADMIN — TRAMADOL HYDROCHLORIDE 50 MG: 50 TABLET, COATED ORAL at 12:24

## 2023-10-23 RX ADMIN — METOPROLOL SUCCINATE 200 MG: 50 TABLET, EXTENDED RELEASE ORAL at 10:11

## 2023-10-23 RX ADMIN — PANTOPRAZOLE SODIUM 20 MG: 20 TABLET, DELAYED RELEASE ORAL at 10:31

## 2023-10-23 RX ADMIN — ACETAMINOPHEN 650 MG: 325 TABLET ORAL at 12:14

## 2023-10-23 RX ADMIN — INSULIN LISPRO 3 UNITS: 100 INJECTION, SOLUTION INTRAVENOUS; SUBCUTANEOUS at 15:39

## 2023-10-23 RX ADMIN — ATORVASTATIN CALCIUM 10 MG: 10 TABLET, FILM COATED ORAL at 15:39

## 2023-10-23 RX ADMIN — SPIRONOLACTONE 25 MG: 25 TABLET ORAL at 10:12

## 2023-10-23 RX ADMIN — KETOROLAC TROMETHAMINE 15 MG: 30 INJECTION, SOLUTION INTRAMUSCULAR at 08:10

## 2023-10-23 RX ADMIN — IPRATROPIUM BROMIDE AND ALBUTEROL SULFATE 3 ML: 2.5; .5 SOLUTION RESPIRATORY (INHALATION) at 08:26

## 2023-10-23 RX ADMIN — FLUTICASONE FUROATE AND VILANTEROL TRIFENATATE 1 PUFF: 100; 25 POWDER RESPIRATORY (INHALATION) at 10:23

## 2023-10-23 RX ADMIN — Medication 400 MG: at 10:12

## 2023-10-23 RX ADMIN — BUSPIRONE HYDROCHLORIDE 10 MG: 5 TABLET ORAL at 17:31

## 2023-10-23 RX ADMIN — APIXABAN 5 MG: 5 TABLET, FILM COATED ORAL at 17:31

## 2023-10-23 RX ADMIN — Medication 1000 UNITS: at 10:12

## 2023-10-23 RX ADMIN — DICLOFENAC SODIUM 2 G: 10 GEL TOPICAL at 12:14

## 2023-10-23 RX ADMIN — INSULIN GLARGINE 50 UNITS: 100 INJECTION, SOLUTION SUBCUTANEOUS at 10:12

## 2023-10-23 RX ADMIN — BUSPIRONE HYDROCHLORIDE 10 MG: 5 TABLET ORAL at 10:12

## 2023-10-23 RX ADMIN — GUAIFENESIN 600 MG: 600 TABLET, EXTENDED RELEASE ORAL at 10:10

## 2023-10-23 RX ADMIN — METHYLPREDNISOLONE SODIUM SUCCINATE 40 MG: 40 INJECTION, POWDER, FOR SOLUTION INTRAMUSCULAR; INTRAVENOUS at 08:10

## 2023-10-23 RX ADMIN — APIXABAN 5 MG: 5 TABLET, FILM COATED ORAL at 10:10

## 2023-10-23 RX ADMIN — INSULIN LISPRO 3 UNITS: 100 INJECTION, SOLUTION INTRAVENOUS; SUBCUTANEOUS at 12:17

## 2023-10-23 RX ADMIN — DIGOXIN 250 MCG: 125 TABLET ORAL at 10:10

## 2023-10-23 RX ADMIN — INSULIN GLARGINE 50 UNITS: 100 INJECTION, SOLUTION SUBCUTANEOUS at 21:03

## 2023-10-23 RX ADMIN — DOCUSATE SODIUM 100 MG: 100 CAPSULE, LIQUID FILLED ORAL at 17:31

## 2023-10-23 RX ADMIN — ACETAMINOPHEN 650 MG: 325 TABLET ORAL at 06:10

## 2023-10-23 RX ADMIN — DICLOFENAC SODIUM 2 G: 10 GEL TOPICAL at 21:03

## 2023-10-23 RX ADMIN — INSULIN LISPRO 12 UNITS: 100 INJECTION, SOLUTION INTRAVENOUS; SUBCUTANEOUS at 07:49

## 2023-10-23 RX ADMIN — DICLOFENAC SODIUM 2 G: 10 GEL TOPICAL at 10:24

## 2023-10-23 RX ADMIN — LOSARTAN POTASSIUM 50 MG: 50 TABLET, FILM COATED ORAL at 10:12

## 2023-10-23 RX ADMIN — OXYCODONE HYDROCHLORIDE AND ACETAMINOPHEN 1000 MG: 500 TABLET ORAL at 10:12

## 2023-10-23 RX ADMIN — ACETAMINOPHEN 650 MG: 325 TABLET ORAL at 17:31

## 2023-10-23 RX ADMIN — LAMOTRIGINE 25 MG: 25 TABLET ORAL at 21:00

## 2023-10-23 RX ADMIN — IPRATROPIUM BROMIDE AND ALBUTEROL SULFATE 3 ML: 2.5; .5 SOLUTION RESPIRATORY (INHALATION) at 19:42

## 2023-10-23 RX ADMIN — GABAPENTIN 800 MG: 400 CAPSULE ORAL at 10:10

## 2023-10-23 RX ADMIN — QUETIAPINE FUMARATE 100 MG: 25 TABLET ORAL at 10:12

## 2023-10-23 RX ADMIN — DICLOFENAC SODIUM 2 G: 10 GEL TOPICAL at 17:32

## 2023-10-23 RX ADMIN — POLYETHYLENE GLYCOL 3350 17 G: 17 POWDER, FOR SOLUTION ORAL at 18:17

## 2023-10-23 RX ADMIN — METHYLPREDNISOLONE SODIUM SUCCINATE 40 MG: 40 INJECTION, POWDER, FOR SOLUTION INTRAMUSCULAR; INTRAVENOUS at 18:16

## 2023-10-23 RX ADMIN — QUETIAPINE 300 MG: 100 TABLET ORAL at 21:03

## 2023-10-23 RX ADMIN — SERTRALINE HYDROCHLORIDE 50 MG: 50 TABLET ORAL at 10:10

## 2023-10-23 RX ADMIN — IOHEXOL 85 ML: 350 INJECTION, SOLUTION INTRAVENOUS at 09:01

## 2023-10-23 NOTE — PLAN OF CARE
Problem: Potential for Falls  Goal: Patient will remain free of falls  Description: INTERVENTIONS:  - Educate patient/family on patient safety including physical limitations  - Instruct patient to call for assistance with activity   - Consult OT/PT to assist with strengthening/mobility   - Keep Call bell within reach  - Keep bed low and locked with side rails adjusted as appropriate  - Keep care items and personal belongings within reach  - Initiate and maintain comfort rounds  - Make Fall Risk Sign visible to staff  - Offer Toileting every  Hours, in advance of need  - Initiate/Maintain alarm  - Obtain necessary fall risk management equipment:   - Apply yellow socks and bracelet for high fall risk patients  - Consider moving patient to room near nurses station  Outcome: Progressing     Problem: PAIN - ADULT  Goal: Verbalizes/displays adequate comfort level or baseline comfort level  Description: Interventions:  - Encourage patient to monitor pain and request assistance  - Assess pain using appropriate pain scale  - Administer analgesics based on type and severity of pain and evaluate response  - Implement non-pharmacological measures as appropriate and evaluate response  - Consider cultural and social influences on pain and pain management  - Notify physician/advanced practitioner if interventions unsuccessful or patient reports new pain  Outcome: Progressing     Problem: INFECTION - ADULT  Goal: Absence or prevention of progression during hospitalization  Description: INTERVENTIONS:  - Assess and monitor for signs and symptoms of infection  - Monitor lab/diagnostic results  - Monitor all insertion sites, i.e. indwelling lines, tubes, and drains  - Monitor endotracheal if appropriate and nasal secretions for changes in amount and color  - [place] appropriate cooling/warming therapies per order  - Administer medications as ordered  - Instruct and encourage patient and family to use good hand hygiene technique  - Identify and instruct in appropriate isolation precautions for identified infection/condition  Outcome: Progressing     Problem: SAFETY ADULT  Goal: Patient will remain free of falls  Description: INTERVENTIONS:  - Educate patient/family on patient safety including physical limitations  - Instruct patient to call for assistance with activity   - Consult OT/PT to assist with strengthening/mobility   - Keep Call bell within reach  - Keep bed low and locked with side rails adjusted as appropriate  - Keep care items and personal belongings within reach  - Initiate and maintain comfort rounds  - Make Fall Risk Sign visible to staff  - Offer Toileting every  Hours, in advance of need  - Initiate/Maintain alarm  - Obtain necessary fall risk management equipment:   - Apply yellow socks and bracelet for high fall risk patients  - Consider moving patient to room near nurses station  Outcome: Progressing  Goal: Maintain or return to baseline ADL function  Description: INTERVENTIONS:  -  Assess patient's ability to carry out ADLs; assess patient's baseline for ADL function and identify physical deficits which impact ability to perform ADLs (bathing, care of mouth/teeth, toileting, grooming, dressing, etc.)  - Assess/evaluate cause of self-care deficits   - Assess range of motion  - Assess patient's mobility; develop plan if impaired  - Assess patient's need for assistive devices and provide as appropriate  - Encourage maximum independence but intervene and supervise when necessary  - Involve family in performance of ADLs  - Assess for home care needs following discharge   - Consider OT consult to assist with ADL evaluation and planning for discharge  - Provide patient education as appropriate  Outcome: Progressing  Goal: Maintains/Returns to pre admission functional level  Description: INTERVENTIONS:  - Perform BMAT or MOVE assessment daily.   - Set and communicate daily mobility goal to care team and patient/family/caregiver. - Collaborate with rehabilitation services on mobility goals if consulted  - Perform Range of Motion  times a day. - Reposition patient every  hours.   - Dangle patient  times a day  - Stand patient  times a day  - Ambulate patient  times a day  - Out of bed to chair  times a day   - Out of bed for meals  times a day  - Out of bed for toileting  - Record patient progress and toleration of activity level   Outcome: Progressing     Problem: RESPIRATORY - ADULT  Goal: Achieves optimal ventilation and oxygenation  Description: INTERVENTIONS:  - Assess for changes in respiratory status  - Assess for changes in mentation and behavior  - Position to facilitate oxygenation and minimize respiratory effort  - Oxygen administered by appropriate delivery if ordered  - Initiate smoking cessation education as indicated  - Encourage broncho-pulmonary hygiene including cough, deep breathe, Incentive Spirometry  - Assess the need for suctioning and aspirate as needed  - Assess and instruct to report SOB or any respiratory difficulty  - Respiratory Therapy support as indicated  Outcome: Progressing     Problem: MOBILITY - ADULT  Goal: Maintain or return to baseline ADL function  Description: INTERVENTIONS:  -  Assess patient's ability to carry out ADLs; assess patient's baseline for ADL function and identify physical deficits which impact ability to perform ADLs (bathing, care of mouth/teeth, toileting, grooming, dressing, etc.)  - Assess/evaluate cause of self-care deficits   - Assess range of motion  - Assess patient's mobility; develop plan if impaired  - Assess patient's need for assistive devices and provide as appropriate  - Encourage maximum independence but intervene and supervise when necessary  - Involve family in performance of ADLs  - Assess for home care needs following discharge   - Consider OT consult to assist with ADL evaluation and planning for discharge  - Provide patient education as appropriate  Outcome: Progressing  Goal: Maintains/Returns to pre admission functional level  Description: INTERVENTIONS:  - Perform BMAT or MOVE assessment daily.   - Set and communicate daily mobility goal to care team and patient/family/caregiver. - Collaborate with rehabilitation services on mobility goals if consulted  - Perform Range of Motion  times a day. - Reposition patient every  hours.   - Dangle patient  times a day  - Stand patient  times a day  - Ambulate patient  times a day  - Out of bed to chair  times a day   - Out of bed for meals  times a day  - Out of bed for toileting  - Record patient progress and toleration of activity level   Outcome: Progressing

## 2023-10-23 NOTE — ASSESSMENT & PLAN NOTE
Chronic, stable.  Home medications include Buspar 10 mg, seroquel 100 mg in AM, seroquel 300 mg HS, zoloft 50 mg, lamotrigine 25 mg

## 2023-10-23 NOTE — ASSESSMENT & PLAN NOTE
Chronic, stable.      Mag level normal   Continue home med mag-ox 400 mg once daily  Continue to monitor

## 2023-10-23 NOTE — PROGRESS NOTES
Daily Progress Note - [facility]  Family Medicine Residency  Marichuy Rocha 58 y.o. male MRN: [id number]  Unit/Bed#: [address] Encounter: [id number]  Admitting Physician: Yg Weber DO   PCP: MANAS Payne  Date of Admission:  10/22/2023  4:16 PM    Assessment and Plan    * Chronic obstructive pulmonary disease with acute exacerbation [facility])  Assessment & Plan  Acute on chronic. On home O2 3L. [facility] Patient notes improvement in SOB but still complaining of cough with exertion and fatigue. Sat 96% on 3L     Plan:   - Continue Azithromycin 500mg IV (10/22 - 10/24)   - Pending CXR read   - Pending CT chest with contrast  - Continue home inhaler regimen  - Continue Duoneb Q6H  - Continue IV Solu-medrol 40 mg Q8H  - Continue PRN albuterol nebulizer Q6H for wheezing and SOB  - Continue Mucinex BID  - Monitor O2 and titrate as needed to keep SpO2 88-92%    SIRS (systemic inflammatory response syndrome) (HCC)  Assessment & Plan  Improving. POA leukocytosis and tachypnea. No known source of infection. WBC trending down 40<33.91 (BL 20). , Pro-Campos WNL  Continue Azithromycin 500 mg IV (10/22-)  Pending CXR read   Pending CT chest with contrast  Pending blood cultures    Electrolyte imbalance  Assessment & Plan  Chronic, stable. K on admission 4.8. Home medications include aldactone and kdur 20 mEq. AM electrolytes within normal range   Continue to monitor     Hypomagnesemia  Assessment & Plan  Chronic, stable. Mag level normal   Continue home med mag-ox 400 mg once daily  Continue to monitor     Ambulatory dysfunction  Assessment & Plan  Chronic, stable. Patient is home bound requiring in-home visits. Plan:   -PT treat and eval  -Falls precautions    Chronic lymphocytic leukemia of B-cell type in remission [facility])  Assessment & Plan  Chronic, pt follows with Dr Michelle Jeffers, was told he doesn't need to be on treatment.      WBC tending down 40<33.91, Baseline 20  Oncology consult placed    Low back pain, unspecified  Assessment & Plan  Chronic, stable. Home medications include Voltaren gel and Tramadol. Plan:  - Continue voltaren gel   - Continue tylenol 650 mg q6h scheduled for back pain  - Continue toradol 15 mg IV Q8H for moderate and severe pain    Bipolar disorder (HCC)  Assessment & Plan  Chronic, stable. Home medications include Buspar 10 mg, seroquel 100 mg in AM, seroquel 300 mg HS, zoloft 50 mg, lamotrigine 25 mg    Chronic a-fib (HCC)  Assessment & Plan  Chronic, stable. EKG in ED confirmed a-fib WITHOUT RVR. Digoxin level wnl    Plan:   -Continue home med eliquis 5 mg BID  -Continue metoprolol 200 mg daily  -Continue digoxin 250 mcg daily      Hyponatremia  Assessment & Plan  Chronic, baseline approximately 132. AM Na 130, Corrected Na 133 (for hyperglycemia)  Continue sodium chloride 1 gram tablet in AM    Uncontrolled diabetes mellitus with hyperglycemia [facility])  Assessment & Plan  Lab Results   Component Value Date    HGBA1C 9.8 (H) 10/19/2023       Recent Labs     10/22/23  1632   POCGLU 194*       Blood Sugar Average: Last 72 hrs:  (P) 194    Chronic, home medications include Glargine 50 U daily, humalog 15U with mealtime, metformin 1000 mg BID. Continue glargine 40U at bedtime   Continue humalog 12U with meals   ISS  Monitor for hypoglycemia           VTE Pharmacologic Prophylaxis: VTE Score: 10 High Risk (Score >/= 5) - Pharmacological DVT Prophylaxis Ordered: apixaban (Eliquis). Sequential Compression Devices Ordered. Patient Centered Rounds: I have performed bedside rounds with nursing staff today. Discussions with Specialists or Other Care Team Provider: Oncology     Education and Discussions with Family / Patient: Patient   Patient Information Sharing: With the consent of Olinda Kendrick , their loved ones Jay Montiel) were notified today by inpatient team of the patient’s condition and current plan. All questions answered.      Time Spent for Care: 20 minutes. More than 50% of total time spent on counseling and coordination of care as described above. Current Length of Stay: 1 day(s)    Current Patient Status: Inpatient   Certification Statement: The patient will continue to require additional inpatient hospital stay due to management of COPD exacerbation     Discharge Plan: Home    Code Status: Level 1 - Full Code    Subjective:   Patient was seen and examined at bedside. He notes improvement in SOB however is complaining of fatigue and cough with exertion which is not new. Patient also states he lost 50-70lbs within a year and has been having consistent night sweats for many months. He denies chest pain, palpitations, abdominal pain, fever, chills, and swelling. Objective     Objective:   Vitals:   Temp (24hrs), Av °F (36.7 °C), Min:97.4 °F (36.3 °C), Max:98.7 °F (37.1 °C)    Temp:  [97.4 °F (36.3 °C)-98.7 °F (37.1 °C)] 97.8 °F (36.6 °C)  HR:  [75-92] 75  Resp:  [18-26] 18  BP: (103-150)/(58-84) 127/73  SpO2:  [96 %-99 %] 97 %  Body mass index is 32.13 kg/m². Input and Output Summary (last 24 hours): Intake/Output Summary (Last 24 hours) at 10/23/2023 0833  Last data filed at 10/23/2023 6017  Gross per 24 hour   Intake 730 ml   Output 1050 ml   Net -320 ml       Physical Exam:   Physical Exam  Vitals reviewed. Constitutional:       General: He is not in acute distress. Appearance: Normal appearance. He is obese. He is not ill-appearing or toxic-appearing. HENT:      Head: Normocephalic and atraumatic. Eyes:      Conjunctiva/sclera: Conjunctivae normal.   Cardiovascular:      Rate and Rhythm: Normal rate. Rhythm irregular. Pulses: Normal pulses. Heart sounds: Normal heart sounds. No murmur heard. Pulmonary:      Effort: Pulmonary effort is normal. No respiratory distress. Breath sounds: Wheezing present. Comments: Coarse breath sounds bilaterally  Abdominal:      General: Abdomen is flat.  Bowel sounds are normal. There is no distension. Palpations: Abdomen is soft. There is no mass. Tenderness: There is no abdominal tenderness. There is no guarding. Musculoskeletal:         General: Tenderness (lower back) present. Right lower leg: No edema. Left lower leg: No edema. Skin:     General: Skin is warm and dry. Neurological:      Mental Status: He is alert and oriented to person, place, and time. Mental status is at baseline. Additional Data:     Labs:  Results from last 7 days   Lab Units 10/23/23  0611 10/22/23  1702   WBC Thousand/uL 33.91* 40.40*   HEMOGLOBIN g/dL 15.3 16.3   HEMATOCRIT % 44.1 46.0   PLATELETS Thousands/uL 199 192   NEUTROS PCT %  --  36*   LYMPHS PCT %  --  60*   LYMPHO PCT % 47*  --    MONOS PCT %  --  3*   MONO PCT % 5  --    EOS PCT % 0 0     Results from last 7 days   Lab Units 10/23/23  0611   POTASSIUM mmol/L 5.0   CHLORIDE mmol/L 93*   CO2 mmol/L 28   BUN mg/dL 24   CREATININE mg/dL 0.90   CALCIUM mg/dL 9.0   ALK PHOS U/L 52   ALT U/L 18   AST U/L 13         Results from last 7 days   Lab Units 10/23/23  0655 10/22/23  2109 10/22/23  1632   POC GLUCOSE mg/dl 229* 358* 194*     Results from last 7 days   Lab Units 10/19/23  1320   HEMOGLOBIN A1C % 9.8*       * I Have Reviewed All Lab Data Listed Above. * Additional Pertinent Lab Tests Reviewed:  [facility] Admission Reviewed    Imaging:    Imaging Reports Reviewed Today Include: CXR   Imaging Personally Reviewed by Myself Includes:  CXR    Recent Cultures (last 7 days):           Last 24 Hours Medication List:   Current Facility-Administered Medications   Medication Dose Route Frequency Provider Last Rate    acetaminophen  650 mg Oral Q6H [address] Verna Graven, DO      albuterol  2.5 mg Nebulization Q6H PRN Verna Graven, DO      apixaban  5 mg Oral BID Verna Graven, DO      vitamin C  1,000 mg Oral Daily Verna Graven, DO      atorvastatin  10 mg Oral Daily With [employer] Sixto, DO      azithromycin  500 mg Intravenous Q24H Brandt Chamber, DO      busPIRone  10 mg Oral BID Brandt Chamber, DO      cholecalciferol  1,000 Units Oral Daily Brandt Chamber, DO      Diclofenac Sodium  2 g Topical 4x Daily Brandt Chamber, DO      digoxin  250 mcg Oral Daily Brandt Chamber, DO      docusate sodium  100 mg Oral BID Brandt Chamber, DO      fluticasone  1 spray Nasal Daily Davina Orland Cooler, DO      Fluticasone Furoate-Vilanterol  1 puff Inhalation Daily Brandt Chamber, DO      gabapentin  800 mg Oral 4x Daily Brandt Chamber, DO      guaiFENesin  600 mg Oral BID Brandt Chamber, DO      insulin glargine  40 Units Subcutaneous HS Brandt Chamber, DO      insulin lispro  12 Units Subcutaneous Daily With Breakfast Brandt Chamber, DO      insulin lispro  12 Units Subcutaneous Daily With Lunch Brandt Chamber, DO      insulin lispro  12 Units Subcutaneous Daily With Dinner Brandt Chamber, DO      insulin lispro  4-20 Units Subcutaneous 4x Daily (AC & HS) Brandt Chamber, DO      ipratropium-albuterol  3 mL Nebulization Q6H Brandt Chamber, DO      ketorolac  15 mg Intravenous Q8H PRN Brandt Chamber, DO      lamoTRIgine  25 mg Oral HS Brandt Chamber, DO      losartan  50 mg Oral Daily Brandt Chamber, DO      magnesium Oxide  400 mg Oral Daily Brandt Chamber, DO      methylPREDNISolone sodium succinate  40 mg Intravenous Q8H Brandt Chamber, DO      metoprolol succinate  200 mg Oral Daily Brandt Chamber, DO      pantoprazole  20 mg Oral Daily Brandt Chamber, DO      polyethylene glycol  17 g Oral Daily PRN Brandt Chamber, DO      QUEtiapine  100 mg Oral QAM Brandt Chamber, DO      QUEtiapine  300 mg Oral HS Brandt Chamber, DO      sertraline  50 mg Oral Daily Brandt Chamber, DO      spironolactone  25 mg Oral Daily Brandt Chamber, DO               ** Please Note: Dictation voice to text software may have been used in the creation of this document. Nya Du MD  10/23/23  8:33 AM

## 2023-10-23 NOTE — ASSESSMENT & PLAN NOTE
Chronic, stable. K on admission 4.8. Home medications include aldactone and kdur 20 mEq.     AM electrolytes within normal range   Continue to monitor
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
## 2023-10-23 NOTE — PLAN OF CARE
Problem: Potential for Falls  Goal: Patient will remain free of falls  Description: INTERVENTIONS:  - Educate patient/family on patient safety including physical limitations  - Instruct patient to call for assistance with activity   - Consult OT/PT to assist with strengthening/mobility   - Keep Call bell within reach  - Keep bed low and locked with side rails adjusted as appropriate  - Keep care items and personal belongings within reach  - Initiate and maintain comfort rounds  - Make Fall Risk Sign visible to staff  - Offer Toileting every 2 Hours, in advance of need  - Initiate/Maintain bed alarm  - Obtain necessary fall risk management equipment: walker, cane  - Apply yellow socks and bracelet for high fall risk patients  - Consider moving patient to room near nurses station  Outcome: Progressing     Problem: PAIN - ADULT  Goal: Verbalizes/displays adequate comfort level or baseline comfort level  Description: Interventions:  - Encourage patient to monitor pain and request assistance  - Assess pain using appropriate pain scale  - Administer analgesics based on type and severity of pain and evaluate response  - Implement non-pharmacological measures as appropriate and evaluate response  - Consider cultural and social influences on pain and pain management  - Notify physician/advanced practitioner if interventions unsuccessful or patient reports new pain  Outcome: Progressing     Problem: INFECTION - ADULT  Goal: Absence or prevention of progression during hospitalization  Description: INTERVENTIONS:  - Assess and monitor for signs and symptoms of infection  - Monitor lab/diagnostic results  - Monitor all insertion sites, i.e. indwelling lines, tubes, and drains  - Monitor endotracheal if appropriate and nasal secretions for changes in amount and color  - [place] appropriate cooling/warming therapies per order  - Administer medications as ordered  - Instruct and encourage patient and family to use good hand hygiene technique  - Identify and instruct in appropriate isolation precautions for identified infection/condition  Outcome: Progressing     Problem: SAFETY ADULT  Goal: Patient will remain free of falls  Description: INTERVENTIONS:  - Educate patient/family on patient safety including physical limitations  - Instruct patient to call for assistance with activity   - Consult OT/PT to assist with strengthening/mobility   - Keep Call bell within reach  - Keep bed low and locked with side rails adjusted as appropriate  - Keep care items and personal belongings within reach  - Initiate and maintain comfort rounds  - Make Fall Risk Sign visible to staff  - Offer Toileting every 2 Hours, in advance of need  - Initiate/Maintain bed alarm  - Obtain necessary fall risk management equipment: walker, cane  - Apply yellow socks and bracelet for high fall risk patients  - Consider moving patient to room near nurses station  Outcome: Progressing  Goal: Maintain or return to baseline ADL function  Description: INTERVENTIONS:  -  Assess patient's ability to carry out ADLs; assess patient's baseline for ADL function and identify physical deficits which impact ability to perform ADLs (bathing, care of mouth/teeth, toileting, grooming, dressing, etc.)  - Assess/evaluate cause of self-care deficits   - Assess range of motion  - Assess patient's mobility; develop plan if impaired  - Assess patient's need for assistive devices and provide as appropriate  - Encourage maximum independence but intervene and supervise when necessary  - Involve family in performance of ADLs  - Assess for home care needs following discharge   - Consider OT consult to assist with ADL evaluation and planning for discharge  - Provide patient education as appropriate  Outcome: Progressing  Goal: Maintains/Returns to pre admission functional level  Description: INTERVENTIONS:  - Perform BMAT or MOVE assessment daily.   - Set and communicate daily mobility goal to care team and patient/family/caregiver. - Collaborate with rehabilitation services on mobility goals if consulted  - Perform Range of Motion 2 times a day. - Reposition patient every 2 hours.   - Dangle patient 2 times a day  - Stand patient 2 times a day  - Ambulate patient 2 times a day  - Out of bed to chair 2 times a day   - Out of bed for meals 2 times a day  - Out of bed for toileting  - Record patient progress and toleration of activity level   Outcome: Progressing     Problem: RESPIRATORY - ADULT  Goal: Achieves optimal ventilation and oxygenation  Description: INTERVENTIONS:  - Assess for changes in respiratory status  - Assess for changes in mentation and behavior  - Position to facilitate oxygenation and minimize respiratory effort  - Oxygen administered by appropriate delivery if ordered  - Initiate smoking cessation education as indicated  - Encourage broncho-pulmonary hygiene including cough, deep breathe, Incentive Spirometry  - Assess the need for suctioning and aspirate as needed  - Assess and instruct to report SOB or any respiratory difficulty  - Respiratory Therapy support as indicated  Outcome: Progressing

## 2023-10-23 NOTE — ASSESSMENT & PLAN NOTE
Chronic, pt follows with Dr Ellen Littlejohn, was told he doesn't need to be on treatment.  IgG 431    WBC elevated likely due to CLL  Oncology consulted, recommend outpatient follow up

## 2023-10-23 NOTE — PLAN OF CARE
Problem: Potential for Falls  Goal: Patient will remain free of falls  Description: INTERVENTIONS:  - Educate patient/family on patient safety including physical limitations  - Instruct patient to call for assistance with activity   - Consult OT/PT to assist with strengthening/mobility   - Keep Call bell within reach  - Keep bed low and locked with side rails adjusted as appropriate  - Keep care items and personal belongings within reach  - Initiate and maintain comfort rounds  - Make Fall Risk Sign visible to staff  - Offer Toileting every  Hours, in advance of need  - Initiate/Maintain alarm  - Obtain necessary fall risk management equipment:   - Apply yellow socks and bracelet for high fall risk patients  - Consider moving patient to room near nurses station  10/23/2023 0753 by Bello Abreu RN  Outcome: Progressing  10/23/2023 0733 by Bello Abreu RN  Outcome: Progressing     Problem: PAIN - ADULT  Goal: Verbalizes/displays adequate comfort level or baseline comfort level  Description: Interventions:  - Encourage patient to monitor pain and request assistance  - Assess pain using appropriate pain scale  - Administer analgesics based on type and severity of pain and evaluate response  - Implement non-pharmacological measures as appropriate and evaluate response  - Consider cultural and social influences on pain and pain management  - Notify physician/advanced practitioner if interventions unsuccessful or patient reports new pain  10/23/2023 0753 by Bello Abreu RN  Outcome: Progressing  10/23/2023 0733 by Bello Abreu RN  Outcome: Progressing     Problem: INFECTION - ADULT  Goal: Absence or prevention of progression during hospitalization  Description: INTERVENTIONS:  - Assess and monitor for signs and symptoms of infection  - Monitor lab/diagnostic results  - Monitor all insertion sites, i.e. indwelling lines, tubes, and drains  - Monitor endotracheal if appropriate and nasal secretions for changes in amount and color  - [place] appropriate cooling/warming therapies per order  - Administer medications as ordered  - Instruct and encourage patient and family to use good hand hygiene technique  - Identify and instruct in appropriate isolation precautions for identified infection/condition  10/23/2023 0753 by Eliezer Vidal RN  Outcome: Progressing  10/23/2023 0733 by Eliezer Vidal RN  Outcome: Progressing     Problem: SAFETY ADULT  Goal: Patient will remain free of falls  Description: INTERVENTIONS:  - Educate patient/family on patient safety including physical limitations  - Instruct patient to call for assistance with activity   - Consult OT/PT to assist with strengthening/mobility   - Keep Call bell within reach  - Keep bed low and locked with side rails adjusted as appropriate  - Keep care items and personal belongings within reach  - Initiate and maintain comfort rounds  - Make Fall Risk Sign visible to staff  - Offer Toileting every  Hours, in advance of need  - Initiate/Maintain alarm  - Obtain necessary fall risk management equipment:   - Apply yellow socks and bracelet for high fall risk patients  - Consider moving patient to room near nurses station  10/23/2023 0753 by Eliezer Vidal RN  Outcome: Progressing  10/23/2023 0733 by Eliezer Vidal RN  Outcome: Progressing  Goal: Maintain or return to baseline ADL function  Description: INTERVENTIONS:  -  Assess patient's ability to carry out ADLs; assess patient's baseline for ADL function and identify physical deficits which impact ability to perform ADLs (bathing, care of mouth/teeth, toileting, grooming, dressing, etc.)  - Assess/evaluate cause of self-care deficits   - Assess range of motion  - Assess patient's mobility; develop plan if impaired  - Assess patient's need for assistive devices and provide as appropriate  - Encourage maximum independence but intervene and supervise when necessary  - Involve family in performance of ADLs  - Assess for home care needs following discharge   - Consider OT consult to assist with ADL evaluation and planning for discharge  - Provide patient education as appropriate  10/23/2023 0753 by Samantha Rodriguez RN  Outcome: Progressing  10/23/2023 0733 by Samantha Rodriguez RN  Outcome: Progressing  Goal: Maintains/Returns to pre admission functional level  Description: INTERVENTIONS:  - Perform BMAT or MOVE assessment daily.   - Set and communicate daily mobility goal to care team and patient/family/caregiver. - Collaborate with rehabilitation services on mobility goals if consulted  - Perform Range of Motion  times a day. - Reposition patient every  hours.   - Dangle patient  times a day  - Stand patient  times a day  - Ambulate patient  times a day  - Out of bed to chair  times a day   - Out of bed for meals  times a day  - Out of bed for toileting  - Record patient progress and toleration of activity level   10/23/2023 0753 by Samantha Rodriguez RN  Outcome: Progressing  10/23/2023 0733 by Samantha Rodriguez RN  Outcome: Progressing     Problem: RESPIRATORY - ADULT  Goal: Achieves optimal ventilation and oxygenation  Description: INTERVENTIONS:  - Assess for changes in respiratory status  - Assess for changes in mentation and behavior  - Position to facilitate oxygenation and minimize respiratory effort  - Oxygen administered by appropriate delivery if ordered  - Initiate smoking cessation education as indicated  - Encourage broncho-pulmonary hygiene including cough, deep breathe, Incentive Spirometry  - Assess the need for suctioning and aspirate as needed  - Assess and instruct to report SOB or any respiratory difficulty  - Respiratory Therapy support as indicated  10/23/2023 0753 by Samantha Rodriguez RN  Outcome: Progressing  10/23/2023 0733 by Samantha Rodriguez RN  Outcome: Progressing     Problem: MOBILITY - ADULT  Goal: Maintain or return to baseline ADL function  Description: INTERVENTIONS:  -  Assess patient's ability to carry out ADLs; assess patient's baseline for ADL function and identify physical deficits which impact ability to perform ADLs (bathing, care of mouth/teeth, toileting, grooming, dressing, etc.)  - Assess/evaluate cause of self-care deficits   - Assess range of motion  - Assess patient's mobility; develop plan if impaired  - Assess patient's need for assistive devices and provide as appropriate  - Encourage maximum independence but intervene and supervise when necessary  - Involve family in performance of ADLs  - Assess for home care needs following discharge   - Consider OT consult to assist with ADL evaluation and planning for discharge  - Provide patient education as appropriate  10/23/2023 0753 by Warren Lua RN  Outcome: Progressing  10/23/2023 0733 by Warren Lua RN  Outcome: Progressing  Goal: Maintains/Returns to pre admission functional level  Description: INTERVENTIONS:  - Perform BMAT or MOVE assessment daily.   - Set and communicate daily mobility goal to care team and patient/family/caregiver. - Collaborate with rehabilitation services on mobility goals if consulted  - Perform Range of Motion  times a day. - Reposition patient every  hours.   - Dangle patient  times a day  - Stand patient  times a day  - Ambulate patient  times a day  - Out of bed to chair  times a day   - Out of bed for meals  times a day  - Out of bed for toileting  - Record patient progress and toleration of activity level   10/23/2023 0753 by Warren Lua RN  Outcome: Progressing  10/23/2023 0733 by Warren Lua RN  Outcome: Progressing

## 2023-10-23 NOTE — ASSESSMENT & PLAN NOTE
Chronic, stable. EKG in ED confirmed a-fib WITHOUT RVR.     Digoxin level wnl    Plan:   -Continue home med eliquis 5 mg BID  -Continue metoprolol 200 mg daily  -Continue digoxin 250 mcg daily

## 2023-10-23 NOTE — ASSESSMENT & PLAN NOTE
Chronic, stable. Patient is home bound requiring in-home visits.     Plan:   -PT treat and eval  -Falls precautions

## 2023-10-23 NOTE — ASSESSMENT & PLAN NOTE
Chronic, stable. Home medications include Voltaren gel and Tramadol.     Plan:  - Continue voltaren gel   - Continue tylenol 650 mg q6h scheduled for back pain  - Started home med tramadol 50mg q6 PRN

## 2023-10-23 NOTE — ASSESSMENT & PLAN NOTE
Chronic, baseline approximately 132.     AM Na 130, Corrected Na 133 (for hyperglycemia)  Continue sodium chloride 1 gram tablet in AM

## 2023-10-24 LAB
ANION GAP SERPL CALCULATED.3IONS-SCNC: 8 MMOL/L
BUN SERPL-MCNC: 23 MG/DL (ref 5–25)
CALCIUM SERPL-MCNC: 9 MG/DL (ref 8.4–10.2)
CHLORIDE SERPL-SCNC: 96 MMOL/L (ref 96–108)
CO2 SERPL-SCNC: 26 MMOL/L (ref 21–32)
CREAT SERPL-MCNC: 0.8 MG/DL (ref 0.6–1.3)
ERYTHROCYTE [DISTWIDTH] IN BLOOD BY AUTOMATED COUNT: 12 % (ref 11.6–15.1)
GFR SERPL CREATININE-BSD FRML MDRD: 94 ML/MIN/1.73SQ M
GLUCOSE SERPL-MCNC: 275 MG/DL (ref 65–140)
GLUCOSE SERPL-MCNC: 282 MG/DL (ref 65–140)
GLUCOSE SERPL-MCNC: 307 MG/DL (ref 65–140)
GLUCOSE SERPL-MCNC: 318 MG/DL (ref 65–140)
GLUCOSE SERPL-MCNC: 366 MG/DL (ref 65–140)
HCT VFR BLD AUTO: 41 % (ref 36.5–49.3)
HGB BLD-MCNC: 14.7 G/DL (ref 12–17)
IGG SERPL-MCNC: 431 MG/DL (ref 635–1741)
MCH RBC QN AUTO: 33.5 PG (ref 26.8–34.3)
MCHC RBC AUTO-ENTMCNC: 35.9 G/DL (ref 31.4–37.4)
MCV RBC AUTO: 93 FL (ref 82–98)
NRBC BLD AUTO-RTO: 0 /100 WBCS
PLATELET # BLD AUTO: 191 THOUSANDS/UL (ref 149–390)
PMV BLD AUTO: 9.9 FL (ref 8.9–12.7)
POTASSIUM SERPL-SCNC: 4.8 MMOL/L (ref 3.5–5.3)
QRS AXIS: 62 DEGREES
QRSD INTERVAL: 86 MS
QT INTERVAL: 360 MS
QTC INTERVAL: 425 MS
RBC # BLD AUTO: 4.39 MILLION/UL (ref 3.88–5.62)
SODIUM SERPL-SCNC: 130 MMOL/L (ref 135–147)
T WAVE AXIS: 40 DEGREES
VENTRICULAR RATE: 84 BPM
WBC # BLD AUTO: 34.79 THOUSAND/UL (ref 4.31–10.16)

## 2023-10-24 PROCEDURE — 97167 OT EVAL HIGH COMPLEX 60 MIN: CPT

## 2023-10-24 PROCEDURE — 99233 SBSQ HOSP IP/OBS HIGH 50: CPT | Performed by: INTERNAL MEDICINE

## 2023-10-24 PROCEDURE — 93010 ELECTROCARDIOGRAM REPORT: CPT | Performed by: INTERNAL MEDICINE

## 2023-10-24 PROCEDURE — 85027 COMPLETE CBC AUTOMATED: CPT

## 2023-10-24 PROCEDURE — 99239 HOSP IP/OBS DSCHRG MGMT >30: CPT | Performed by: INTERNAL MEDICINE

## 2023-10-24 PROCEDURE — 97163 PT EVAL HIGH COMPLEX 45 MIN: CPT

## 2023-10-24 PROCEDURE — 82784 ASSAY IGA/IGD/IGG/IGM EACH: CPT | Performed by: INTERNAL MEDICINE

## 2023-10-24 PROCEDURE — 82948 REAGENT STRIP/BLOOD GLUCOSE: CPT

## 2023-10-24 PROCEDURE — 80048 BASIC METABOLIC PNL TOTAL CA: CPT

## 2023-10-24 PROCEDURE — 94760 N-INVAS EAR/PLS OXIMETRY 1: CPT

## 2023-10-24 PROCEDURE — 94640 AIRWAY INHALATION TREATMENT: CPT

## 2023-10-24 RX ORDER — INSULIN GLARGINE 100 [IU]/ML
55 INJECTION, SOLUTION SUBCUTANEOUS EVERY MORNING
Status: DISCONTINUED | OUTPATIENT
Start: 2023-10-25 | End: 2023-10-25 | Stop reason: HOSPADM

## 2023-10-24 RX ORDER — INSULIN GLARGINE 100 [IU]/ML
55 INJECTION, SOLUTION SUBCUTANEOUS
Status: DISCONTINUED | OUTPATIENT
Start: 2023-10-24 | End: 2023-10-25 | Stop reason: HOSPADM

## 2023-10-24 RX ORDER — SODIUM CHLORIDE 1 G/1
1 TABLET ORAL ONCE
Status: COMPLETED | OUTPATIENT
Start: 2023-10-24 | End: 2023-10-24

## 2023-10-24 RX ORDER — METHYLPREDNISOLONE SODIUM SUCCINATE 40 MG/ML
40 INJECTION, POWDER, LYOPHILIZED, FOR SOLUTION INTRAMUSCULAR; INTRAVENOUS EVERY 12 HOURS SCHEDULED
Status: DISCONTINUED | OUTPATIENT
Start: 2023-10-24 | End: 2023-10-25 | Stop reason: HOSPADM

## 2023-10-24 RX ORDER — BENZONATATE 100 MG/1
100 CAPSULE ORAL 3 TIMES DAILY PRN
Status: DISCONTINUED | OUTPATIENT
Start: 2023-10-24 | End: 2023-10-25 | Stop reason: HOSPADM

## 2023-10-24 RX ADMIN — DICLOFENAC SODIUM 2 G: 10 GEL TOPICAL at 21:12

## 2023-10-24 RX ADMIN — ACETAMINOPHEN 650 MG: 325 TABLET ORAL at 17:11

## 2023-10-24 RX ADMIN — GABAPENTIN 800 MG: 400 CAPSULE ORAL at 09:05

## 2023-10-24 RX ADMIN — IPRATROPIUM BROMIDE AND ALBUTEROL SULFATE 3 ML: 2.5; .5 SOLUTION RESPIRATORY (INHALATION) at 07:14

## 2023-10-24 RX ADMIN — TRAMADOL HYDROCHLORIDE 50 MG: 50 TABLET, COATED ORAL at 04:48

## 2023-10-24 RX ADMIN — METHYLPREDNISOLONE SODIUM SUCCINATE 40 MG: 40 INJECTION, POWDER, FOR SOLUTION INTRAMUSCULAR; INTRAVENOUS at 00:03

## 2023-10-24 RX ADMIN — DICLOFENAC SODIUM 2 G: 10 GEL TOPICAL at 17:14

## 2023-10-24 RX ADMIN — GABAPENTIN 800 MG: 400 CAPSULE ORAL at 12:07

## 2023-10-24 RX ADMIN — DIGOXIN 250 MCG: 125 TABLET ORAL at 09:08

## 2023-10-24 RX ADMIN — LAMOTRIGINE 25 MG: 25 TABLET ORAL at 21:10

## 2023-10-24 RX ADMIN — DICLOFENAC SODIUM 2 G: 10 GEL TOPICAL at 12:12

## 2023-10-24 RX ADMIN — GABAPENTIN 800 MG: 400 CAPSULE ORAL at 21:11

## 2023-10-24 RX ADMIN — IPRATROPIUM BROMIDE AND ALBUTEROL SULFATE 3 ML: 2.5; .5 SOLUTION RESPIRATORY (INHALATION) at 19:38

## 2023-10-24 RX ADMIN — BENZONATATE 100 MG: 100 CAPSULE ORAL at 19:07

## 2023-10-24 RX ADMIN — INSULIN LISPRO 15 UNITS: 100 INJECTION, SOLUTION INTRAVENOUS; SUBCUTANEOUS at 11:57

## 2023-10-24 RX ADMIN — SODIUM CHLORIDE 1 G: 1 TABLET ORAL at 14:15

## 2023-10-24 RX ADMIN — APIXABAN 5 MG: 5 TABLET, FILM COATED ORAL at 09:09

## 2023-10-24 RX ADMIN — BUSPIRONE HYDROCHLORIDE 10 MG: 5 TABLET ORAL at 17:12

## 2023-10-24 RX ADMIN — INSULIN LISPRO 4 UNITS: 100 INJECTION, SOLUTION INTRAVENOUS; SUBCUTANEOUS at 07:53

## 2023-10-24 RX ADMIN — AZITHROMYCIN MONOHYDRATE 500 MG: 500 INJECTION, POWDER, LYOPHILIZED, FOR SOLUTION INTRAVENOUS at 21:11

## 2023-10-24 RX ADMIN — METHYLPREDNISOLONE SODIUM SUCCINATE 40 MG: 40 INJECTION, POWDER, FOR SOLUTION INTRAMUSCULAR; INTRAVENOUS at 10:07

## 2023-10-24 RX ADMIN — Medication 400 MG: at 09:09

## 2023-10-24 RX ADMIN — GABAPENTIN 800 MG: 400 CAPSULE ORAL at 17:12

## 2023-10-24 RX ADMIN — INSULIN LISPRO 15 UNITS: 100 INJECTION, SOLUTION INTRAVENOUS; SUBCUTANEOUS at 07:52

## 2023-10-24 RX ADMIN — IPRATROPIUM BROMIDE AND ALBUTEROL SULFATE 3 ML: 2.5; .5 SOLUTION RESPIRATORY (INHALATION) at 14:00

## 2023-10-24 RX ADMIN — ACETAMINOPHEN 650 MG: 325 TABLET ORAL at 05:00

## 2023-10-24 RX ADMIN — TRAMADOL HYDROCHLORIDE 50 MG: 50 TABLET, COATED ORAL at 10:03

## 2023-10-24 RX ADMIN — LOSARTAN POTASSIUM 50 MG: 50 TABLET, FILM COATED ORAL at 09:06

## 2023-10-24 RX ADMIN — FLUTICASONE FUROATE AND VILANTEROL TRIFENATATE 1 PUFF: 100; 25 POWDER RESPIRATORY (INHALATION) at 09:11

## 2023-10-24 RX ADMIN — APIXABAN 5 MG: 5 TABLET, FILM COATED ORAL at 17:12

## 2023-10-24 RX ADMIN — INSULIN GLARGINE 50 UNITS: 100 INJECTION, SOLUTION SUBCUTANEOUS at 09:10

## 2023-10-24 RX ADMIN — DOCUSATE SODIUM 100 MG: 100 CAPSULE, LIQUID FILLED ORAL at 09:11

## 2023-10-24 RX ADMIN — QUETIAPINE FUMARATE 100 MG: 25 TABLET ORAL at 09:09

## 2023-10-24 RX ADMIN — SPIRONOLACTONE 25 MG: 25 TABLET ORAL at 09:08

## 2023-10-24 RX ADMIN — OXYCODONE HYDROCHLORIDE AND ACETAMINOPHEN 1000 MG: 500 TABLET ORAL at 09:07

## 2023-10-24 RX ADMIN — FLUTICASONE PROPIONATE 1 SPRAY: 50 SPRAY, METERED NASAL at 09:10

## 2023-10-24 RX ADMIN — TRAMADOL HYDROCHLORIDE 50 MG: 50 TABLET, COATED ORAL at 19:06

## 2023-10-24 RX ADMIN — BUSPIRONE HYDROCHLORIDE 10 MG: 5 TABLET ORAL at 09:08

## 2023-10-24 RX ADMIN — INSULIN LISPRO 4 UNITS: 100 INJECTION, SOLUTION INTRAVENOUS; SUBCUTANEOUS at 17:04

## 2023-10-24 RX ADMIN — INSULIN LISPRO 6 UNITS: 100 INJECTION, SOLUTION INTRAVENOUS; SUBCUTANEOUS at 11:59

## 2023-10-24 RX ADMIN — ACETAMINOPHEN 650 MG: 325 TABLET ORAL at 11:56

## 2023-10-24 RX ADMIN — SERTRALINE HYDROCHLORIDE 50 MG: 50 TABLET ORAL at 09:10

## 2023-10-24 RX ADMIN — ATORVASTATIN CALCIUM 10 MG: 10 TABLET, FILM COATED ORAL at 17:12

## 2023-10-24 RX ADMIN — INSULIN GLARGINE 55 UNITS: 100 INJECTION, SOLUTION SUBCUTANEOUS at 21:09

## 2023-10-24 RX ADMIN — INSULIN LISPRO 15 UNITS: 100 INJECTION, SOLUTION INTRAVENOUS; SUBCUTANEOUS at 17:04

## 2023-10-24 RX ADMIN — PANTOPRAZOLE SODIUM 20 MG: 20 TABLET, DELAYED RELEASE ORAL at 09:09

## 2023-10-24 RX ADMIN — DICLOFENAC SODIUM 2 G: 10 GEL TOPICAL at 09:11

## 2023-10-24 RX ADMIN — DOCUSATE SODIUM 100 MG: 100 CAPSULE, LIQUID FILLED ORAL at 17:12

## 2023-10-24 RX ADMIN — METOPROLOL SUCCINATE 200 MG: 50 TABLET, EXTENDED RELEASE ORAL at 09:10

## 2023-10-24 RX ADMIN — GUAIFENESIN 600 MG: 600 TABLET, EXTENDED RELEASE ORAL at 09:05

## 2023-10-24 RX ADMIN — METHYLPREDNISOLONE SODIUM SUCCINATE 40 MG: 40 INJECTION, POWDER, FOR SOLUTION INTRAMUSCULAR; INTRAVENOUS at 21:11

## 2023-10-24 RX ADMIN — Medication 1000 UNITS: at 09:08

## 2023-10-24 RX ADMIN — GUAIFENESIN 600 MG: 600 TABLET, EXTENDED RELEASE ORAL at 17:12

## 2023-10-24 RX ADMIN — ACETAMINOPHEN 650 MG: 325 TABLET ORAL at 00:03

## 2023-10-24 RX ADMIN — IPRATROPIUM BROMIDE AND ALBUTEROL SULFATE 3 ML: 2.5; .5 SOLUTION RESPIRATORY (INHALATION) at 02:01

## 2023-10-24 RX ADMIN — QUETIAPINE 300 MG: 100 TABLET ORAL at 21:10

## 2023-10-24 NOTE — PROGRESS NOTES
Patient:  Pricila Law    MRN:  [id number]    Michaelin Request ID:  [id number]    Level of care reserved:  [address]    Partner Reserved:  [facility], [facility] [address] [phone]    Clinical needs requested:    Geography searched:  10 miles around [phone]    Start of Service:    Request sent:  4:35pm EDT on 10/24/2023 by Tracey Najjar    Partner reserved:  4:51pm EDT on 10/24/2023 by Tracey Najjar    Choice list shared:  4:51pm EDT on 10/24/2023 by Tracey Najjar

## 2023-10-24 NOTE — PLAN OF CARE
Problem: Potential for Falls  Goal: Patient will remain free of falls  Description: INTERVENTIONS:  - Educate patient/family on patient safety including physical limitations  - Instruct patient to call for assistance with activity   - Consult OT/PT to assist with strengthening/mobility   - Keep Call bell within reach  - Keep bed low and locked with side rails adjusted as appropriate  - Keep care items and personal belongings within reach  - Initiate and maintain comfort rounds  - Make Fall Risk Sign visible to staff  - Apply yellow socks and bracelet for high fall risk patients  - Consider moving patient to room near nurses station  Outcome: Progressing     Problem: PAIN - ADULT  Goal: Verbalizes/displays adequate comfort level or baseline comfort level  Description: Interventions:  - Encourage patient to monitor pain and request assistance  - Assess pain using appropriate pain scale  - Administer analgesics based on type and severity of pain and evaluate response  - Implement non-pharmacological measures as appropriate and evaluate response  - Consider cultural and social influences on pain and pain management  - Notify physician/advanced practitioner if interventions unsuccessful or patient reports new pain  Outcome: Progressing     Problem: INFECTION - ADULT  Goal: Absence or prevention of progression during hospitalization  Description: INTERVENTIONS:  - Assess and monitor for signs and symptoms of infection  - Monitor lab/diagnostic results  - Monitor all insertion sites, i.e. indwelling lines, tubes, and drains  - Monitor endotracheal if appropriate and nasal secretions for changes in amount and color  - [place] appropriate cooling/warming therapies per order  - Administer medications as ordered  - Instruct and encourage patient and family to use good hand hygiene technique  - Identify and instruct in appropriate isolation precautions for identified infection/condition  Outcome: Progressing     Problem: SAFETY ADULT  Goal: Patient will remain free of falls  Description: INTERVENTIONS:  - Educate patient/family on patient safety including physical limitations  - Instruct patient to call for assistance with activity   - Consult OT/PT to assist with strengthening/mobility   - Keep Call bell within reach  - Keep bed low and locked with side rails adjusted as appropriate  - Keep care items and personal belongings within reach  - Initiate and maintain comfort rounds  - Make Fall Risk Sign visible to staff  - Apply yellow socks and bracelet for high fall risk patients  - Consider moving patient to room near nurses station  Outcome: Progressing     Problem: RESPIRATORY - ADULT  Goal: Achieves optimal ventilation and oxygenation  Description: INTERVENTIONS:  - Assess for changes in respiratory status  - Assess for changes in mentation and behavior  - Position to facilitate oxygenation and minimize respiratory effort  - Oxygen administered by appropriate delivery if ordered  - Initiate smoking cessation education as indicated  - Encourage broncho-pulmonary hygiene including cough, deep breathe, Incentive Spirometry  - Assess the need for suctioning and aspirate as needed  - Assess and instruct to report SOB or any respiratory difficulty  - Respiratory Therapy support as indicated  Outcome: Progressing

## 2023-10-24 NOTE — PHYSICAL THERAPY NOTE
PHYSICAL THERAPY EVALUATION       10/24/23 1045   PT Last Visit   PT Visit Date 10/24/23   Note Type   Note type Evaluation   Pain Assessment   Pain Assessment Tool 0-10   Pain Score No Pain   Restrictions/Precautions   Other Precautions Fall Risk;O2   Home Living   Type of Home Apartment   Home Layout One level;Elevator   Prior Function   Level of [place] Independent with functional mobility  (ambulates with a rollator and 3L02)   Lives With Alone   Receives Help From Home health;Friend(s)  Pt has a HHA 5x a week   General   Additional Pertinent History Pt admitted with COPD exacerbation. Cognition   Overall Cognitive Status WFL   Arousal/Participation Cooperative   Following Commands Follows all commands and directions without difficulty   Subjective   Subjective "not feeling much better"   RLE Assessment   RLE Assessment   (ROM WFL,MMT 4/5)   LLE Assessment   LLE Assessment   (ROM WFL, MMT 4/5)   Bed Mobility   Supine to Sit 5  Supervision   Transfers   Sit to Stand 4  Minimal assistance   Stand to Sit 4  Minimal assistance   Stand pivot 4  Minimal assistance   Additional items   (with walker)   Ambulation/Elevation   Gait pattern   (very slow christina, flexed posture)   Gait Assistance 4  Minimal assist   Assistive Device Rolling walker   Distance 15 feet with 3L02;  Pt too fatigued for any other activity. Sp02 >90% with activity. Balance   Static Sitting Fair +   Dynamic Sitting Fair   Static Standing Fair   Dynamic Standing Fair   Ambulatory Fair   Activity Tolerance   Activity Tolerance Patient limited by fatigue   Assessment   Prognosis Good   Problem List Decreased strength;Decreased endurance;Decreased mobility;Obesity   Assessment Patient is an 58y.o. year old male seen for Physical Therapy evaluation. Patient admitted with COPD with acute exacerbation ([address]).   Comorbidities affecting patient's physical performance include: bipolar disorder, CLL, ambulatory dysfnction, morbid obesity, COPD.  Personal factors affecting patient at time of initial evaluation include: ambulating with assistive device, inability to ambulate household distances, and limited home support. Prior to admission, patient was independent with functional mobility with walker . Please find objective findings from Physical Therapy assessment regarding body systems outlined above with impairments and limitations including weakness, decreased endurance, decreased activity tolerance, decreased functional mobility tolerance, and SOB upon exertion. The Barthel Index was used as a functional outcome tool presenting with a score of Barthel Index Score: 55 today indicating marked limitations of functional mobility and ADLS. Patient's clinical presentation is currently unstable/unpredictable as seen in patient's presentation of new onset of impairment of functional mobility, decreased endurance, and new onset of weakness. Pt would benefit from continued Physical Therapy treatment to address deficits as defined above and maximize level of functional mobility. As demonstrated by objective findings, the assigned level of complexity for this evaluation is high. The patient's -[facility] Basic Mobility Inpatient Short Form Raw Score is 16. A Raw score of less than or equal to 16 suggests the patient may benefit from discharge to post-acute rehabilitation services.    Goals   Patient Goals "feel better"   STG Expiration Date 10/31/23   Short Term Goal #1 Independent bed mobility, independent transfers, patient will ambulate with rolling walker with modified independence 40 feet with minimal shortness of breath and fatigue   LTG Expiration Date 11/07/23   Long Term Goal #1 Independent ambulation with a rolling walker 100 feet without shortness of breath or fatigue, patient will stand for 10 minutes with minimal shortness of breath or fatigue so he can prepare a simple snack in the kitchen   Plan   Treatment/Interventions Functional transfer training;LE strengthening/ROM; Endurance training; Therapeutic exercise;Gait training;Bed mobility; Equipment eval/education   PT Frequency   (5x/week)   Discharge Recommendation   PT Discharge Recommendation Post acute rehabilitation services   AM-PAC Basic Mobility Inpatient   Turning in Flat Bed Without Bedrails 3   Lying on Back to Sitting on Edge of Flat Bed Without Bedrails 3   Moving Bed to Chair 3   Standing Up From Chair Using Arms 3   Walk in Room 3   Climb 3-5 Stairs With Railing 1   Basic Mobility Inpatient Raw Score 16   Basic Mobility Standardized Score 38.32   Highest Level Of Mobility   -HLM Goal 5: Stand one or more mins   -HLM Achieved 6: Walk 10 steps or more   Barthel Index   Feeding 10   Bathing 0   Grooming Score 5   Dressing Score 5   Bladder Score 10   Bowels Score 10   Toilet Use Score 5   Transfers (Bed/Chair) Score 10   Mobility (Level Surface) Score 0   Stairs Score 0   Barthel Index Score 54   Licensure   [place] License Number  Torriebelice PT  [id number]

## 2023-10-24 NOTE — CASE MANAGEMENT
Case Management Discharge Planning Note    Patient name Mariah Sorenson  Location 2 [facility] [address] MRN [id number]  : 1960 Date 10/24/2023       Current Admission Date: 10/22/2023  Current Admission Diagnosis:COPD with acute exacerbation [facility])   Patient Active Problem List    Diagnosis Date Noted    Electrolyte imbalance 10/22/2023    Severe pain of left shoulder 2023    Severe pain 2023    Morbid (severe) obesity with alveolar hypoventilation ([address]) 2023    Acute sensory neuropathy 2023    Allergies 2023    Hypomagnesemia 2023    Hepatosplenomegaly 2023    Ambulatory dysfunction 2023    Immunodeficiency, unspecified ([address]) 2022    Hypo-osmolality and hyponatremia 2022    Low back pain, unspecified 2022    Chronic lymphocytic leukemia of B-cell type in remission ([address]) 2022    Chronic obstructive pulmonary disease, unspecified ([address]) 2022    Chronic pain syndrome 2022    Foraminal stenosis of lumbar region 2022    Lumbar post-laminectomy syndrome 2022    Lumbar radiculopathy 2022    Shortness of breath 2022    SIRS (systemic inflammatory response syndrome) ([address]) 2022    Dysuria 2021    Other intervertebral disc degeneration, lumbar region 2021    Bipolar disorder ([address]) 2021    Hypertensive heart and chronic kidney disease with heart failure and stage 1 through stage 4 chronic kidney disease, or unspecified chronic kidney disease ([address]) 2021    Paroxysmal atrial fibrillation ([address]) 2021    Chronic respiratory failure with hypoxia ([address]) 2021    Chronic kidney disease, stage 2 (mild) 2021    Atherosclerotic heart disease of native coronary artery without angina pectoris 2021    Peripheral neuropathy 2021    Muscle weakness (generalized) 2021    Platelets decreased ([address]) 2021    Acute on chronic diastolic congestive heart failure ([address]) 11/17/2020    Hyperlipidemia 10/29/2020    Nutritional anemia, unspecified  10/29/2020    Chest pain 10/11/2020    Simple chronic bronchitis ([address]) 10/11/2020    Hyperglycemia 05/01/2020    Transition of care performed with sharing of clinical summary 04/11/2020    Obstructive sleep apnea 02/02/2020    Hyponatremia 02/02/2020    COPD with acute exacerbation ([address]) 02/02/2020    Chronic a-fib ([address]) 02/02/2020    H/O vitamin D deficiency 10/28/2019    Type 2 diabetes mellitus with complication, with long-term current use of insulin ([address]) 06/21/2019    Restrictive ventilatory defect 03/26/2019    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 ([address]) 03/25/2019    Lung disease, restrictive 11/21/2018    Chronic respiratory failure ([address]) 10/31/2018    Coronary artery disease 09/06/2017    Esophageal reflux 09/06/2017    ANA LILIA (acute kidney injury) ([address]) 03/20/2017    Chronic low back pain 11/30/2016    SHAVONNE and COPD overlap syndrome      Morbid obesity      Uncontrolled diabetes mellitus with hyperglycemia ([address]) 09/02/2016    Fatigue 09/02/2016    GERD 06/08/2016    Psychiatric disorder     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/25/2015    Erectile dysfunction of organic origin 08/25/2014    Essential hypertension 09/04/2012    Diabetic neuropathy ([address]) 09/04/2012    Panic disorder without agoraphobia 09/04/2012    Vitamin D deficiency 09/04/2012      LOS (days): 2  Geometric Mean LOS (GMLOS) (days): 2.70  Days to GMLOS:0.8     OBJECTIVE:  Risk of Unplanned Readmission Score: 36.72     Current admission status: Inpatient   Preferred Pharmacy:   [facility], [address] [facility]  [address] [facility]  [address] [address]  Phone: [phone] Fax: [phone]    Primary Care Provider: MANAS Payne    Primary Insurance: [id number] [facility]  Secondary Insurance:     DISCHARGE DETAILS:    Discharge planning discussed with[de-identified] Patient  Freedom of Choice: Yes  Comments - Freedom of Choice: SW following to assist with DCP. STR placement is planned. Pt has been accepted by his preferred facility, [facility]. SW reviewed acceptance with pt and pt requested transfer to [facility] upon discharge. Facility will be reserved. Will follow. Other Referral/Resources/Interventions Provided:  Interventions: Short Term Rehab  Referral Comments: STR placement at [facility] is planned. Task sent to [facility] Discharge Support for authorization request submission to [employer].     Treatment Team Recommendation: Short Term Rehab  Discharge Destination Plan[de-identified] Short Term Rehab  Transport at Discharge : Wheelchair Sonja

## 2023-10-24 NOTE — PLAN OF CARE
Problem: Potential for Falls  Goal: Patient will remain free of falls  Description: INTERVENTIONS:  - Educate patient/family on patient safety including physical limitations  - Instruct patient to call for assistance with activity   - Consult OT/PT to assist with strengthening/mobility   - Keep Call bell within reach  - Keep bed low and locked with side rails adjusted as appropriate  - Keep care items and personal belongings within reach  - Initiate and maintain comfort rounds  - Make Fall Risk Sign visible to staff  - Offer Toileting every 2 Hours, in advance of need  - Initiate/Maintain bed alarm  - Obtain necessary fall risk management equipment: nonskid footwear  Problem: RESPIRATORY - ADULT  Goal: Achieves optimal ventilation and oxygenation  Description: INTERVENTIONS:  - Assess for changes in respiratory status  - Assess for changes in mentation and behavior  - Position to facilitate oxygenation and minimize respiratory effort  - Oxygen administered by appropriate delivery if ordered  - Initiate smoking cessation education as indicated  - Encourage broncho-pulmonary hygiene including cough, deep breathe, Incentive Spirometry  - Assess the need for suctioning and aspirate as needed  - Assess and instruct to report SOB or any respiratory difficulty  - Respiratory Therapy support as indicated  Outcome: Progressing     - Apply yellow socks and bracelet for high fall risk patients  - Consider moving patient to room near nurses station  Outcome: Progressing

## 2023-10-24 NOTE — OCCUPATIONAL THERAPY NOTE
OT EVALUATION       10/24/23 6177   OT Last Visit   OT Visit Date 10/24/23   Note Type   Note type Evaluation   Pain Assessment   Pain Assessment Tool 0-10   Pain Score 6   Pain Location/Orientation Location: Back   Restrictions/Precautions   Other Precautions Fall Risk;O2   Home Living   Type of Home Apartment   Home Layout One level;Elevator   Bathroom Shower/Tub Walk-in shower   Bathroom Toilet Raised   Bathroom Equipment Shower chair;Grab bars in shower;Commode  (uses commode over toilet)   Home Equipment Reacher;Sock aid; Walker  (O2)   Prior Function   Level of [place] Independent with ADLs; Independent with functional mobility  (uses rollator and O2 3L)   Lives With Alone   Receives Help From Friend(s); Home health  (HHA assists with cleaning and laundry and groceries)   IADLs Family/Friend/Other provides transportation; Family/Friend/Other provides medication management   Comments Patient admitted with with coughing, SOB, night sweats lasting one week. Past medical history includes COPD on 3 L at baseline, CLL without treatment, atrial fibrillation anticoagulated with Eliquis, type 2 diabetes on insulin, bipolar disorder.    Subjective   Subjective "I'm weak, I need to go to rehab"   ADL   Eating Assistance 7  [address] 3  Moderate [address] to Stand 4  Minimal assistance   Stand to Sit 4  Minimal assistance   Functional Mobility   Functional Mobility 4  Minimal assistance   Additional Comments short distance in room, limited by fatigue   Balance   Static Sitting Fair +   Dynamic Sitting Fair   Static Standing Fair   Dynamic Standing Fair   Activity Tolerance   Activity Tolerance Patient limited by fatigue   RUE Assessment   RUE Assessment (shoulder flexion to 80 degrees, elbow/ WFL)   LUE Assessment   LUE Assessment   (shoulder flexion to 80 degrees, elbow/ WFL)   Cognition   Overall Cognitive Status WFL   Arousal/Participation Cooperative   Attention Within functional limits   Orientation Level Oriented X4   Following Commands Follows all commands and directions without difficulty   Assessment   Limitation Decreased ADL status; Decreased UE strength;Decreased Safe judgement during ADL;Decreased endurance;Decreased high-level ADLs; Decreased self-care trans  (decreased balance and mobility)   Prognosis Good   Assessment Patient evaluated by Occupational Therapy. Patient admitted with COPD with acute exacerbation ([address]). The patients occupational profile, medical and therapy history includes a extensive additional review of physical, cognitive, or psychosocial history related to current functional performance. Comorbidities affecting functional mobility and ADLS include: Afib, anxiety, Bipolar disorder, CAD, COPD, depression, diabetes, and fibromyalgia. Prior to admission, patient was independent with functional mobility with rollator, independent with ADLS, and requiring assist for IADLS. The evaluation identifies the following performance deficits: weakness, impaired balance, decreased endurance, increased fall risk, new onset of impairment of functional mobility, decreased ADLS, decreased IADLS, pain, decreased activity tolerance, decreased safety awareness, SOB upon exertion, and decreased strength, that result in activity limitations and/or participation restrictions. This evaluation requires clinical decision making of high complexity, because the patient presents with comorbidites that affect occupational performance and required significant modification of tasks or assistance with consideration of multiple treatment options.   The Barthel Index was used as a functional outcome tool presenting with a score of Barthel Index Score: 50, indicating marked limitations of functional mobility and ADLS. The patient's raw score on the -PAC Daily Activity Inpatient Short Form is 18. A raw score of less than 19 suggests the patient may benefit from discharge to post-acute rehabilitation services. Please refer to the recommendation of the Occupational Therapist for safe discharge planning. Patient will benefit from skilled Occupational Therapy services to address above deficits and facilitate a safe return to prior level of function. Goals   Patient Goals to get stronger   STG Time Frame   (1-7 days)   Short Term Goal  Goals established to promote Patient Goals: to get stronger:  Grooming: supervision standing at sink; Bathing: min assist; Upper Body Dressing supervision; Lower Body Dressing: supervision; Toileting: supervision; Patient will increase ambulatory standard toilet transfer to supervision with rolling walker to increase performance and safety with ADLS and functional mobility; Patient will increase standing tolerance to 5 minutes during ADL task to decrease assistance level and decrease fall risk; Patient will increase bed mobility to supervision in preparation for ADLS and transfers;  Patient will increase functional mobility to and from bathroom with rolling walker with supervision to increase performance with ADLS and to use a toilet; Patient will tolerate 5 minutes of UE ROM/strengthening to increase general activity tolerance and performance in ADLS/IADLS; Patient will improve functional activity tolerance to 10 minutes of sustained functional tasks to increase participation in basic self-care and decrease assistance level;  Patient will be able to to verbalize understanding and perform energy conservation/proper body mechanics during ADLS and functional mobility at least 75% of the time with minimal cueing to decrease signs of fatigue and increase stamina to return to prior level of function; Patient will increase dynamic sitting balance to fair+ to improve the ability to sit at edge of bed or on a chair for ADLS;  Patient will increase dynamic standing balance to fair+ to improve postural stability and decrease fall risk during standing ADLS and transfers. LTG Time Frame   (8-14 days)   Long Term Goal Grooming: independent standing at sink; Bathing: supervision; Upper Body Dressing independent; Lower Body Dressing: independent; Toileting: supervision; Patient will increase ambulatory standard toilet transfer to independent with rolling walker to increase performance and safety with ADLS and functional mobility; Patient will increase standing tolerance to 10 minutes during ADL task to decrease assistance level and decrease fall risk; Patient will increase bed mobility to independent in preparation for ADLS and transfers; Patient will increase functional mobility to and from bathroom with rolling walker with independently to increase performance with ADLS and to use a toilet; Patient will tolerate 10 minutes of UE ROM/strengthening to increase general activity tolerance and performance in ADLS/IADLS; Patient will improve functional activity tolerance to 20 minutes of sustained functional tasks to increase participation in basic self-care and decrease assistance level;  Patient will be able to to verbalize understanding and perform energy conservation/proper body mechanics during ADLS and functional mobility at least 90% of the time to decrease signs of fatigue and increase stamina to return to prior level of function; Patient will increase dynamic sitting balance to good to improve the ability to sit at edge of bed or on a chair for ADLS;  Patient will increase dynamic standing balance to good to improve postural stability and decrease fall risk during standing ADLS and transfers. Pt will score >/= 22/24 on AM-PAC Daily Activity Inpatient scale to promote safe independence with ADLs and functional mobility;  Pt will score >/= 80/100 on Barthel Index in order to decrease caregiver assistance needed and increase ability to perform ADLs and functional mobility. Plan   Treatment Interventions ADL retraining;Functional transfer training;UE strengthening/ROM; Endurance training;Patient/family training;Equipment evaluation/education; Activityengagement; Energy conservation; Compensatory technique education   Goal Expiration Date 11/07/23   OT Frequency 3-5x/wk   Discharge Recommendation   OT Discharge Recommendation Post acute rehabilitation services   AM-PAC Daily Activity Inpatient   Lower Body Dressing 3   Bathing 2   Toileting 3   Upper Body Dressing 3   Grooming 3   Eating 4   Daily Activity Raw Score 18   Daily Activity Standardized Score (Calc for Raw Score >=11) 38.66   AM-PAC Applied Cognition Inpatient   Following a Speech/Presentation 4   Understanding Ordinary Conversation 4   Taking Medications 4   Remembering Where Things Are Placed or Put Away 4   Remembering List of 4-5 Errands 4   Taking Care of Complicated Tasks 4   Applied Cognition Raw Score 24   Applied Cognition Standardized Score 62.21   Barthel Index   Feeding 10   Bathing 0   Grooming Score 0   Dressing Score 5   Bladder Score 10   Bowels Score 10   Toilet Use Score 5   Transfers (Bed/Chair) Score 10   Mobility (Level Surface) Score 0   Stairs Score 0   Barthel Index Score 50   Licensure   [place] License Number  Olam Cancel [id number] [facility] OTR/L [id number]

## 2023-10-24 NOTE — PROGRESS NOTES
Daily Progress Note - [facility]  Family Medicine Residency  Chyna Rosas 58 y.o. male MRN: [id number]  Unit/Bed#: [facility] Encounter: [id number]  Admitting Physician: Marcelino Christopher DO   PCP: MANAS Payne  Date of Admission:  10/22/2023  4:16 PM  Assessment and Plan  * COPD with acute exacerbation [facility])  Assessment & Plan  Acute on chronic. On home O2 3L. Imaging shows no evidence of pneumonia or pleural effusion, just mild interstitial edema and chronic collapse of the trachea. Strep pneumo and legionella negative    No overnight events. Patient notes improvement in SOB but still complaining of cough with exertion and fatigue. Sat 98% on 3L . Plan:   Changed IV Solu-medrol 40 mg Q12h  Continue Azithromycin 500mg IV (10/22 - 10/24)   Continue home inhaler regimen  Continue Duoneb Q6H  Continue PRN albuterol nebulizer Q6H for wheezing and SOB  Continue Mucinex BID  Monitor O2 and titrate as needed to keep SpO2 88-92%    SIRS (systemic inflammatory response syndrome) ([address] )  Assessment & Plan  Improving. POA leukocytosis and tachypnea. No known source of infection. WBC 34.79, elevated likely due to his underlying CLL. Trended down from 40 at [facility]   Continue Azithromycin 500 mg IV (10/22-)  Pending blood cultures    Electrolyte imbalance  Assessment & Plan  Chronic, stable. K on admission 4.8. Home medications include aldactone and kdur 20 mEq. AM electrolytes within normal range   Continue to monitor     Hypomagnesemia  Assessment & Plan  Chronic, stable. Mag level normal   Continue home med mag-ox 400 mg once daily  Continue to monitor     Ambulatory dysfunction  Assessment & Plan  Chronic, stable. Patient is home bound requiring in-home visits. Plan:   -PT treat and eval  -Falls precautions    Chronic lymphocytic leukemia of B-cell type in remission [facility])  Assessment & Plan  Chronic, pt follows with Dr Yang Butts, was told he doesn't need to be on treatment.      WBC elevated likely due to BAUER FOUND HSP-ANTIOCH  Oncology consulted, recommend outpatient follow up   F/u IgG results     Low back pain, unspecified  Assessment & Plan  Chronic, stable. Home medications include Voltaren gel and Tramadol. Plan:  - Continue voltaren gel   - Continue tylenol 650 mg q6h scheduled for back pain  - Started home med tramadol 50mg q6 PRN     Bipolar disorder (HCC)  Assessment & Plan  Chronic, stable. Home medications include Buspar 10 mg, seroquel 100 mg in AM, seroquel 300 mg HS, zoloft 50 mg, lamotrigine 25 mg    Chronic a-fib (HCC)  Assessment & Plan  Chronic, stable. EKG in ED confirmed a-fib WITHOUT RVR. Digoxin level wnl    Plan:   -Continue home med eliquis 5 mg BID  -Continue metoprolol 200 mg daily  -Continue digoxin 250 mcg daily      Hyponatremia  Assessment & Plan  Chronic, baseline approximately 132. AM Na 130, Corrected Na 133 (for hyperglycemia)  Continue sodium chloride 1 gram tablet in AM    Uncontrolled diabetes mellitus with hyperglycemia [facility])  Assessment & Plan  Lab Results   Component Value Date    HGBA1C 9.8 (H) 10/19/2023       Recent Labs     10/23/23  1053 10/23/23  1537 10/23/23  2036 10/24/23  0710   POCGLU 251* 249* 378* 282*       Blood Sugar Average: Last 72 hrs:  (P) [id number]    Chronic, home medications include Glargine 50 U daily, humalog 15U with mealtime, metformin 1000 mg BID. BG still elevated likely due to steroids, will decrease solumedrol 40mg to Q12   Continue to monitor BG throughout day, if still elevated will consider increasing PM insulin dose   Continue home regimen Lantus 50U AM and 50U PM   Continue Lispro 15U with meals  ISS, algorithm 3   Hypoglycemia protocol           VTE Pharmacologic Prophylaxis: VTE Score: 10 High Risk (Score >/= 5) - Pharmacological DVT Prophylaxis Ordered: apixaban (Eliquis). Sequential Compression Devices Ordered. Patient Centered Rounds: I have performed bedside rounds with nursing staff today.     Discussions with Specialists or Other Care Team Provider: Heme/onc    Education and Discussions with Family / Patient: Patient   Patient Information Sharing: With the consent of Vish Fairchild , their loved ones Brittany Fisher) were notified today by inpatient team of the patient’s condition and current plan. All questions answered. Time Spent for Care: 20 minutes. More than 50% of total time spent on counseling and coordination of care as described above. Current Length of Stay: 2 day(s)    Current Patient Status: Inpatient   Certification Statement: The patient will continue to require additional inpatient hospital stay due to no improvement in symptoms of cough, SOB and fatigue    Discharge Plan: Home vs STR pending PT/OT eval     Code Status: Level 1 - Full Code    Subjective:   Patient was seen and examined at bedside. Patient was sitting on a chair in not acute distress eating his breakfast at the time of examination. He is complaining of worsening fatigue and SOB. He wishes to stay in the hospital due to weakness and fatigue. Patient is eating well and had a BM yesterday. He denies chest pain, palpitations, abdominal pain, n/v/d, fever, chills and swelling. Objective     Objective:   Vitals:   Temp (24hrs), Av.5 °F (36.4 °C), Min:97.4 °F (36.3 °C), Max:97.6 °F (36.4 °C)    Temp:  [97.4 °F (36.3 °C)-97.6 °F (36.4 °C)] 97.6 °F (36.4 °C)  HR:  [64-89] 85  Resp:  [12-20] 20  BP: (113-151)/(65-86) 151/86  SpO2:  [95 %-100 %] 97 %  Body mass index is 31.99 kg/m². Input and Output Summary (last 24 hours):     No intake or output data in the 24 hours ending 10/24/23 1303    Physical Exam:   Physical Exam  Vitals reviewed. Constitutional:       General: He is not in acute distress. Appearance: Normal appearance. He is obese. He is not ill-appearing or toxic-appearing. HENT:      Head: Normocephalic and atraumatic.    Eyes:      Conjunctiva/sclera: Conjunctivae normal.   Cardiovascular:      Rate and Rhythm: Normal rate. Rhythm irregular. Pulses: Normal pulses. Heart sounds: Normal heart sounds. No murmur heard. Pulmonary:      Effort: Pulmonary effort is normal. No respiratory distress. Breath sounds: No wheezing. Comments: Distant breath sounds bilaterally  Abdominal:      General: Abdomen is flat. Bowel sounds are normal. There is no distension. Palpations: Abdomen is soft. There is no mass. Tenderness: There is no abdominal tenderness. There is no guarding. Musculoskeletal:         General: Tenderness (lower back) present. Right lower leg: No edema. Left lower leg: No edema. Skin:     General: Skin is warm and dry. Neurological:      Mental Status: He is alert and oriented to person, place, and time. Mental status is at baseline. Additional Data:     Labs:  Results from last 7 days   Lab Units 10/24/23  0455 10/23/23  0611 10/22/23  1702   WBC Thousand/uL 34.79* 33.91* 40.40*   HEMOGLOBIN g/dL 14.7 15.3 16.3   HEMATOCRIT % 41.0 44.1 46.0   PLATELETS Thousands/uL 191 199 192   NEUTROS PCT %  --   --  36*   LYMPHS PCT %  --   --  60*   LYMPHO PCT %  --  47*  --    MONOS PCT %  --   --  3*   MONO PCT %  --  5  --    EOS PCT %  --  0 0     Results from last 7 days   Lab Units 10/24/23  0455 10/23/23  0611   POTASSIUM mmol/L 4.8 5.0   CHLORIDE mmol/L 96 93*   CO2 mmol/L 26 28   BUN mg/dL 23 24   CREATININE mg/dL 0.80 0.90   CALCIUM mg/dL 9.0 9.0   ALK PHOS U/L  --  52   ALT U/L  --  18   AST U/L  --  13         Results from last 7 days   Lab Units 10/24/23  1117 10/24/23  0710 10/23/23  2036 10/23/23  1537 10/23/23  1053   POC GLUCOSE mg/dl 366* 282* 378* 249* 251*     Results from last 7 days   Lab Units 10/19/23  1320   HEMOGLOBIN A1C % 9.8*       * I Have Reviewed All Lab Data Listed Above. * Additional Pertinent Lab Tests Reviewed:  All Labs For Current Hospital Admission Reviewed    Imaging:    Imaging Reports Reviewed Today Include: CXR, CT chest  Imaging Personally Reviewed by Myself Includes:  CXR, CT chest    Recent Cultures (last 7 days):     Results from last 7 days   Lab Units 10/22/23  2312 10/22/23  2252 10/22/23  2240   BLOOD CULTURE  No Growth at 24 hrs. No Growth at 24 hrs.   --    LEGIONELLA URINARY ANTIGEN   --   --  Negative       Last 24 Hours Medication List:   Current Facility-Administered Medications   Medication Dose Route Frequency Provider Last Rate    acetaminophen  650 mg Oral Q6H LELAND Dorothe Hutton, DO      albuterol  2.5 mg Nebulization Q6H PRN Dorothe Hutton, DO      apixaban  5 mg Oral BID Dorothe Hutton, DO      vitamin C  1,000 mg Oral Daily Dorothe Hutton, DO      atorvastatin  10 mg Oral Daily With Lisa Friend, DO      azithromycin  500 mg Intravenous Q24H Dorothe Hutton, DO      benzonatate  100 mg Oral TID PRN Jess Jacob MD      busPIRone  10 mg Oral BID Dorothe Hutton, DO      cholecalciferol  1,000 Units Oral Daily Dorothe Hutton, DO      Diclofenac Sodium  2 g Topical 4x Daily Dorothe Hutton, DO      digoxin  250 mcg Oral Daily Dorothe Hutton, DO      docusate sodium  100 mg Oral BID Dorothe Hutton, DO      fluticasone  1 spray Nasal Daily Dorothe Hutton, DO      Fluticasone Furoate-Vilanterol  1 puff Inhalation Daily Dorothe Hutton, DO      gabapentin  800 mg Oral 4x Daily Dorothe Hutton, DO      guaiFENesin  600 mg Oral BID Dorothe Hutton, DO      insulin glargine  50 Units Subcutaneous HS Jess Jacob MD      insulin glargine  50 Units Subcutaneous QAM Jess Jacob MD      insulin lispro  1-6 Units Subcutaneous TID [facility] Jess Jacob MD      insulin lispro  15 Units Subcutaneous Daily With Lunch Jess Jacob MD      insulin lispro  15 Units Subcutaneous Daily With Ervin Cruz MD      insulin lispro  15 Units Subcutaneous Daily With Breakfast Jess Jacob MD      ipratropium-albuterol  3 mL Nebulization Q6H Dorothe Hutton, DO      lamoTRIgine  25 mg Oral HS [facility], DO      losartan  50 mg Oral Daily [facility], DO      magnesium Oxide  400 mg Oral Daily [facility], DO      methylPREDNISolone sodium succinate  40 mg Intravenous Q12H [address] Meghan Hwang MD      metoprolol succinate  200 mg Oral Daily [facility], DO      pantoprazole  20 mg Oral Daily [facility], DO      polyethylene glycol  17 g Oral Daily PRN [facility], DO      QUEtiapine  100 mg [facility], DO      QUEtiapine  300 mg Oral HS [facility], DO      sertraline  50 mg Oral Daily [facility], DO      spironolactone  25 mg Oral Daily [facility], DO      tiZANidine  4 mg Oral Q8H PRN [facility], DO      traMADol  50 mg Oral Q6H PRN Michelle Betancourt, DO               ** Please Note: Dictation voice to text software may have been used in the creation of this document.  Meghan Hwang MD  10/24/23  1:03 PM

## 2023-10-24 NOTE — PLAN OF CARE
Problem: PHYSICAL THERAPY ADULT  Goal: Performs mobility at highest level of function for planned discharge setting. See evaluation for individualized goals. Description: Treatment/Interventions: Functional transfer training, LE strengthening/ROM, Endurance training, Therapeutic exercise, Gait training, Bed mobility, Equipment eval/education          See flowsheet documentation for full assessment, interventions and recommendations. Note: Prognosis: Good  Problem List: Decreased strength, Decreased endurance, Decreased mobility, Obesity  Assessment: Patient is an 58y.o. year old male seen for Physical Therapy evaluation. Patient admitted with COPD with acute exacerbation ([address] Comorbidities affecting patient's physical performance include: bipolar disorder, CLL, ambulatory dysfnction, morbid obesity, COPD. Personal factors affecting patient at time of initial evaluation include: ambulating with assistive device, inability to ambulate household distances, and limited home support. Prior to admission, patient was independent with functional mobility with walker . Please find objective findings from Physical Therapy assessment regarding body systems outlined above with impairments and limitations including weakness, decreased endurance, decreased activity tolerance, decreased functional mobility tolerance, and SOB upon exertion. The Barthel Index was used as a functional outcome tool presenting with a score of Barthel Index Score: 55 today indicating marked limitations of functional mobility and ADLS. Patient's clinical presentation is currently unstable/unpredictable as seen in patient's presentation of new onset of impairment of functional mobility, decreased endurance, and new onset of weakness. Pt would benefit from continued Physical Therapy treatment to address deficits as defined above and maximize level of functional mobility.  As demonstrated by objective findings, the assigned level of complexity for this evaluation is high. The patient's AM-PAC Basic Mobility Inpatient Short Form Raw Score is 16. A Raw score of less than or equal to 16 suggests the patient may benefit from discharge to post-acute rehabilitation services. PT Discharge Recommendation: Post acute rehabilitation services    See flowsheet documentation for full assessment.

## 2023-10-24 NOTE — CASE MANAGEMENT
Case Management Assessment & Discharge Planning Note    Patient name Myranda Buck  Location [address] MRN [id number]  : 1960 Date 10/24/2023       Current Admission Date: 10/22/2023  Current Admission Diagnosis:COPD with acute exacerbation [facility])   Patient Active Problem List    Diagnosis Date Noted    Electrolyte imbalance 10/22/2023    Severe pain of left shoulder 2023    Severe pain 2023    Morbid (severe) obesity with alveolar hypoventilation ([address]) 2023    Acute sensory neuropathy 2023    Allergies 2023    Hypomagnesemia 2023    Hepatosplenomegaly 2023    Ambulatory dysfunction 2023    Immunodeficiency, unspecified ([address]) 2022    Hypo-osmolality and hyponatremia 2022    Low back pain, unspecified 2022    Chronic lymphocytic leukemia of B-cell type in remission ([address]) 2022    Chronic obstructive pulmonary disease, unspecified ([address]) 2022    Chronic pain syndrome 2022    Foraminal stenosis of lumbar region 2022    Lumbar post-laminectomy syndrome 2022    Lumbar radiculopathy 2022    Shortness of breath 2022    SIRS (systemic inflammatory response syndrome) ([address]) 2022    Dysuria 2021    Other intervertebral disc degeneration, lumbar region 2021    Bipolar disorder ([address]) 2021    Hypertensive heart and chronic kidney disease with heart failure and stage 1 through stage 4 chronic kidney disease, or unspecified chronic kidney disease ([address]) 2021    Paroxysmal atrial fibrillation ([address]) 2021    Chronic respiratory failure with hypoxia ([address]) 2021    Chronic kidney disease, stage 2 (mild) 2021    Atherosclerotic heart disease of native coronary artery without angina pectoris 2021    Peripheral neuropathy 2021    Muscle weakness (generalized) 2021    Platelets decreased ([address]) 2021    Acute on chronic diastolic congestive heart failure ([address]) 11/17/2020    Hyperlipidemia 10/29/2020    Nutritional anemia, unspecified  10/29/2020    Chest pain 10/11/2020    Simple chronic bronchitis ([address]) 10/11/2020    Hyperglycemia 05/01/2020    Transition of care performed with sharing of clinical summary 04/11/2020    Obstructive sleep apnea 02/02/2020    Hyponatremia 02/02/2020    COPD with acute exacerbation ([address]) 02/02/2020    Chronic a-fib ([address]) 02/02/2020    H/O vitamin D deficiency 10/28/2019    Type 2 diabetes mellitus with complication, with long-term current use of insulin ([address]) 06/21/2019    Restrictive ventilatory defect 03/26/2019    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 ([address]) 03/25/2019    Lung disease, restrictive 11/21/2018    Chronic respiratory failure ([address]) 10/31/2018    Coronary artery disease 09/06/2017    Esophageal reflux 09/06/2017    ANA LILIA (acute kidney injury) ([address]) 03/20/2017    Chronic low back pain 11/30/2016    SHAVONNE and COPD overlap syndrome      Morbid obesity      Uncontrolled diabetes mellitus with hyperglycemia ([address]) 09/02/2016    Fatigue 09/02/2016    GERD 06/08/2016    Psychiatric disorder     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/25/2015    Erectile dysfunction of organic origin 08/25/2014    Essential hypertension 09/04/2012    Diabetic neuropathy ([address]) 09/04/2012    Panic disorder without agoraphobia 09/04/2012    Vitamin D deficiency 09/04/2012      LOS (days): 2  Geometric Mean LOS (GMLOS) (days): 2.70  Days to GMLOS:0.8     OBJECTIVE:    Risk of Unplanned Readmission Score: 36.72     Current admission status: Inpatient    Preferred Pharmacy:   [address] [place] [address] Road [facility]  Phone: [phone] Fax: [phone]    Primary Care Provider: MANAS Payne    Primary Insurance: [facility]  Secondary Insurance:     ASSESSMENT:  [place] Proxies    There are no active Health Care Proxies on file.       Advance Directives  Does patient have a [address]?: Yes  Does patient have Advance Directives?: Yes  Advance Directives: Living will, Power of  for health care  Primary Contact: Lane Leiva    Readmission Root Cause  30 Day Readmission: No    Patient Information  Admitted from[de-identified] Home  Mental Status: Alert  During Assessment patient was accompanied by: Not accompanied during assessment  Assessment information provided by[de-identified] Patient  Primary Caregiver: Self  Support Systems: Self, Friends/neighbors, Home care staff  [place] of Residence: [address] [place] [address] do you live in?: [address] [facility] entry access options. Select all that apply.: Elevator  Type of Current Residence: Apartment  Floor Level: 2  Upon entering residence, is there a bedroom on the main floor (no further steps)?: Yes  Upon entering residence, is there a bathroom on the main floor (no further steps)?: Yes  In the last 12 months, was there a time when you were not able to pay the mortgage or rent on time?: No  In the last 12 months, how many places have you lived?: 1  In the last 12 months, was there a time when you did not have a steady place to sleep or slept in a shelter (including now)?: No  Homeless/housing insecurity resource given?: N/A  Living Arrangements: Lives Alone    Activities of Daily Living Prior to Admission  Functional Status: Assistance  Completes ADLs independently?: No  Level of ADL dependence: Assistance  Ambulates independently?: Yes  Does patient use assisted devices?: Yes  Assisted Devices (DME) used: BiPAP, Home Oxygen concentrator, Portable Oxygen concentrator, Portable Oxygen tanks, Rollator, Walker, Other (Comment) (electric scooter)  DME Company Name (respiratory supplies):  [employer]  O2 Rate(s): 3  Does patient currently own DME?: Yes  What DME does the patient currently own?: BiPAP, Home Oxygen concentrator, Portable Oxygen concentrator, Portable Oxygen tanks, Rollator, Walker, Other (Comment) (electric scooter)  Does patient have a history of Outpatient Therapy (PT/OT)?: No  Does the patient have a history of Short-Term Rehab?: Yes  Does patient have a history of HHC?: Yes  Does patient currently have [facility]?: Yes    Current Home Health Care  Type of Current Home Care Services: Home health aide ([facility]-5 days/week, 3-4 hours/day)    Patient Information Continued  Income Source: SSI/SSD  Does patient have prescription coverage?: Yes  Within the past 12 months, you worried that your food would run out before you got the money to buy more.: Never true  Within the past 12 months, the food you bought just didn't last and you didn't have money to get more.: Never true  Food insecurity resource given?: N/A  Does patient receive dialysis treatments?: No  Does patient have a history of Mental Health Diagnosis?: Yes (Bipolar Disorder)  Is patient receiving treatment for mental health?: Yes    Means of Transportation  [facility] Other (Comment) ([address])  In the past 12 months, has lack of transportation kept you from medical appointments or from getting medications?: No  In the past 12 months, has lack of transportation kept you from meetings, work, or from getting things needed for daily living?: No  Was application for public transport provided?: N/A    DISCHARGE DETAILS:    Discharge planning discussed with[de-identified] Patient  Freedom of Choice: Yes  Comments - Freedom of Choice: SW met with pt to assess needs and discuss plans. STR placement is being recommended. Pt is open to rehab placement and requested placement at [facility] if possible. SW agreeable with referrals being made to other local facilities in [facility] has no availability. SW will follow to monitor progress and assist with planning.     Requested [address]         Is the patient interested in [facility] at discharge?: No    DME Referral Provided  Referral made for DME?: No    Other Referral/Resources/Interventions Provided:  Interventions: Short Term Rehab  Referral Comments: STR referrals have been made to local facilities including [facility] [address] [place] [address] Team Recommendation: Short Term Rehab  Discharge Destination Plan[de-identified] Short Term Rehab  Transport at Discharge : Wheelchair Sonja

## 2023-10-25 VITALS
TEMPERATURE: 97 F | SYSTOLIC BLOOD PRESSURE: 123 MMHG | DIASTOLIC BLOOD PRESSURE: 99 MMHG | HEIGHT: 71 IN | RESPIRATION RATE: 17 BRPM | BODY MASS INDEX: 32.06 KG/M2 | WEIGHT: 229 LBS | OXYGEN SATURATION: 97 % | HEART RATE: 85 BPM

## 2023-10-25 PROBLEM — E87.8 ELECTROLYTE IMBALANCE: Status: RESOLVED | Noted: 2023-10-22 | Resolved: 2023-10-25

## 2023-10-25 PROBLEM — J44.1 COPD WITH ACUTE EXACERBATION (HCC): Status: RESOLVED | Noted: 2020-02-02 | Resolved: 2023-10-25

## 2023-10-25 PROBLEM — R65.10 SIRS (SYSTEMIC INFLAMMATORY RESPONSE SYNDROME) (HCC): Status: RESOLVED | Noted: 2022-02-07 | Resolved: 2023-10-25

## 2023-10-25 PROBLEM — E83.42 HYPOMAGNESEMIA: Status: RESOLVED | Noted: 2023-05-11 | Resolved: 2023-10-25

## 2023-10-25 PROBLEM — E87.1 HYPONATREMIA: Status: RESOLVED | Noted: 2020-02-02 | Resolved: 2023-10-25

## 2023-10-25 LAB
ANION GAP SERPL CALCULATED.3IONS-SCNC: 8 MMOL/L
BUN SERPL-MCNC: 19 MG/DL (ref 5–25)
CALCIUM SERPL-MCNC: 9 MG/DL (ref 8.4–10.2)
CHLORIDE SERPL-SCNC: 98 MMOL/L (ref 96–108)
CO2 SERPL-SCNC: 27 MMOL/L (ref 21–32)
CREAT SERPL-MCNC: 0.62 MG/DL (ref 0.6–1.3)
ERYTHROCYTE [DISTWIDTH] IN BLOOD BY AUTOMATED COUNT: 12 % (ref 11.6–15.1)
GFR SERPL CREATININE-BSD FRML MDRD: 104 ML/MIN/1.73SQ M
GLUCOSE SERPL-MCNC: 175 MG/DL (ref 65–140)
GLUCOSE SERPL-MCNC: 218 MG/DL (ref 65–140)
GLUCOSE SERPL-MCNC: 237 MG/DL (ref 65–140)
GLUCOSE SERPL-MCNC: 254 MG/DL (ref 65–140)
HCT VFR BLD AUTO: 42 % (ref 36.5–49.3)
HGB BLD-MCNC: 14.4 G/DL (ref 12–17)
MCH RBC QN AUTO: 32.4 PG (ref 26.8–34.3)
MCHC RBC AUTO-ENTMCNC: 34.3 G/DL (ref 31.4–37.4)
MCV RBC AUTO: 94 FL (ref 82–98)
NRBC BLD AUTO-RTO: 0 /100 WBCS
PLATELET # BLD AUTO: 200 THOUSANDS/UL (ref 149–390)
PMV BLD AUTO: 9.4 FL (ref 8.9–12.7)
POTASSIUM SERPL-SCNC: 4.8 MMOL/L (ref 3.5–5.3)
RBC # BLD AUTO: 4.45 MILLION/UL (ref 3.88–5.62)
SODIUM SERPL-SCNC: 133 MMOL/L (ref 135–147)
WBC # BLD AUTO: 34.61 THOUSAND/UL (ref 4.31–10.16)

## 2023-10-25 PROCEDURE — 82948 REAGENT STRIP/BLOOD GLUCOSE: CPT

## 2023-10-25 PROCEDURE — 85027 COMPLETE CBC AUTOMATED: CPT

## 2023-10-25 PROCEDURE — 94760 N-INVAS EAR/PLS OXIMETRY 1: CPT

## 2023-10-25 PROCEDURE — 80048 BASIC METABOLIC PNL TOTAL CA: CPT

## 2023-10-25 PROCEDURE — 94640 AIRWAY INHALATION TREATMENT: CPT

## 2023-10-25 PROCEDURE — 94668 MNPJ CHEST WALL SBSQ: CPT

## 2023-10-25 RX ORDER — INSULIN GLARGINE 100 [IU]/ML
50 INJECTION, SOLUTION SUBCUTANEOUS
Qty: 10 ML | Refills: 0 | Status: CANCELLED | OUTPATIENT
Start: 2023-10-25

## 2023-10-25 RX ORDER — ATORVASTATIN CALCIUM 10 MG/1
10 TABLET, FILM COATED ORAL
Refills: 0 | Status: CANCELLED
Start: 2023-10-25

## 2023-10-25 RX ORDER — BENZONATATE 100 MG/1
100 CAPSULE ORAL 3 TIMES DAILY PRN
Qty: 20 CAPSULE | Refills: 0
Start: 2023-10-25

## 2023-10-25 RX ORDER — BENZONATATE 100 MG/1
100 CAPSULE ORAL 3 TIMES DAILY PRN
Qty: 20 CAPSULE | Refills: 0 | OUTPATIENT
Start: 2023-10-25

## 2023-10-25 RX ORDER — DOCUSATE SODIUM 100 MG/1
100 CAPSULE, LIQUID FILLED ORAL 2 TIMES DAILY
Refills: 0
Start: 2023-10-25

## 2023-10-25 RX ORDER — POLYETHYLENE GLYCOL 3350 17 G/17G
17 POWDER, FOR SOLUTION ORAL DAILY PRN
Refills: 0
Start: 2023-10-25

## 2023-10-25 RX ORDER — PREDNISONE 20 MG/1
20 TABLET ORAL DAILY
Qty: 3 TABLET | Refills: 0 | OUTPATIENT
Start: 2023-11-01 | End: 2023-11-04

## 2023-10-25 RX ORDER — LANOLIN ALCOHOL/MO/W.PET/CERES
400 CREAM (GRAM) TOPICAL DAILY
Refills: 0
Start: 2023-10-26

## 2023-10-25 RX ORDER — PREDNISONE 20 MG/1
30 TABLET ORAL DAILY
Qty: 5 TABLET | Refills: 0
Start: 2023-10-29 | End: 2023-11-01

## 2023-10-25 RX ORDER — PREDNISONE 20 MG/1
40 TABLET ORAL DAILY
Refills: 0
Start: 2023-10-26 | End: 2023-10-29

## 2023-10-25 RX ORDER — GUAIFENESIN 600 MG/1
600 TABLET, EXTENDED RELEASE ORAL 2 TIMES DAILY
Refills: 0
Start: 2023-10-25

## 2023-10-25 RX ORDER — INSULIN GLARGINE 100 [IU]/ML
50 INJECTION, SOLUTION SUBCUTANEOUS EVERY MORNING
Qty: 10 ML | Refills: 0 | Status: CANCELLED | OUTPATIENT
Start: 2023-10-25

## 2023-10-25 RX ORDER — INSULIN LISPRO 100 [IU]/ML
15 INJECTION, SOLUTION INTRAVENOUS; SUBCUTANEOUS
Refills: 0
Start: 2023-10-26

## 2023-10-25 RX ORDER — INSULIN LISPRO 100 [IU]/ML
1-6 INJECTION, SOLUTION INTRAVENOUS; SUBCUTANEOUS
Refills: 0
Start: 2023-10-25

## 2023-10-25 RX ORDER — IPRATROPIUM BROMIDE AND ALBUTEROL SULFATE 2.5; .5 MG/3ML; MG/3ML
3 SOLUTION RESPIRATORY (INHALATION)
Refills: 0
Start: 2023-10-25

## 2023-10-25 RX ORDER — PREDNISONE 10 MG/1
10 TABLET ORAL DAILY
Qty: 3 TABLET | Refills: 0 | OUTPATIENT
Start: 2023-11-04 | End: 2023-11-07

## 2023-10-25 RX ADMIN — INSULIN LISPRO 3 UNITS: 100 INJECTION, SOLUTION INTRAVENOUS; SUBCUTANEOUS at 16:42

## 2023-10-25 RX ADMIN — SPIRONOLACTONE 25 MG: 25 TABLET ORAL at 09:52

## 2023-10-25 RX ADMIN — IPRATROPIUM BROMIDE AND ALBUTEROL SULFATE 3 ML: 2.5; .5 SOLUTION RESPIRATORY (INHALATION) at 00:22

## 2023-10-25 RX ADMIN — ACETAMINOPHEN 650 MG: 325 TABLET ORAL at 05:29

## 2023-10-25 RX ADMIN — Medication 1000 UNITS: at 09:52

## 2023-10-25 RX ADMIN — LOSARTAN POTASSIUM 50 MG: 50 TABLET, FILM COATED ORAL at 09:52

## 2023-10-25 RX ADMIN — INSULIN LISPRO 15 UNITS: 100 INJECTION, SOLUTION INTRAVENOUS; SUBCUTANEOUS at 16:41

## 2023-10-25 RX ADMIN — DICLOFENAC SODIUM 2 G: 10 GEL TOPICAL at 17:39

## 2023-10-25 RX ADMIN — POLYETHYLENE GLYCOL 3350 17 G: 17 POWDER, FOR SOLUTION ORAL at 09:47

## 2023-10-25 RX ADMIN — INSULIN LISPRO 15 UNITS: 100 INJECTION, SOLUTION INTRAVENOUS; SUBCUTANEOUS at 11:54

## 2023-10-25 RX ADMIN — DICLOFENAC SODIUM 2 G: 10 GEL TOPICAL at 12:56

## 2023-10-25 RX ADMIN — METHYLPREDNISOLONE SODIUM SUCCINATE 40 MG: 40 INJECTION, POWDER, FOR SOLUTION INTRAMUSCULAR; INTRAVENOUS at 10:34

## 2023-10-25 RX ADMIN — FLUTICASONE FUROATE AND VILANTEROL TRIFENATATE 1 PUFF: 100; 25 POWDER RESPIRATORY (INHALATION) at 09:54

## 2023-10-25 RX ADMIN — QUETIAPINE FUMARATE 100 MG: 25 TABLET ORAL at 09:50

## 2023-10-25 RX ADMIN — INSULIN GLARGINE 55 UNITS: 100 INJECTION, SOLUTION SUBCUTANEOUS at 09:47

## 2023-10-25 RX ADMIN — GUAIFENESIN 600 MG: 600 TABLET, EXTENDED RELEASE ORAL at 09:50

## 2023-10-25 RX ADMIN — FLUTICASONE PROPIONATE 1 SPRAY: 50 SPRAY, METERED NASAL at 09:55

## 2023-10-25 RX ADMIN — ACETAMINOPHEN 650 MG: 325 TABLET ORAL at 17:37

## 2023-10-25 RX ADMIN — DOCUSATE SODIUM 100 MG: 100 CAPSULE, LIQUID FILLED ORAL at 09:51

## 2023-10-25 RX ADMIN — APIXABAN 5 MG: 5 TABLET, FILM COATED ORAL at 17:38

## 2023-10-25 RX ADMIN — INSULIN LISPRO 1 UNITS: 100 INJECTION, SOLUTION INTRAVENOUS; SUBCUTANEOUS at 08:12

## 2023-10-25 RX ADMIN — BUSPIRONE HYDROCHLORIDE 10 MG: 5 TABLET ORAL at 17:38

## 2023-10-25 RX ADMIN — ATORVASTATIN CALCIUM 10 MG: 10 TABLET, FILM COATED ORAL at 16:41

## 2023-10-25 RX ADMIN — Medication 400 MG: at 09:51

## 2023-10-25 RX ADMIN — IPRATROPIUM BROMIDE AND ALBUTEROL SULFATE 3 ML: 2.5; .5 SOLUTION RESPIRATORY (INHALATION) at 08:00

## 2023-10-25 RX ADMIN — OXYCODONE HYDROCHLORIDE AND ACETAMINOPHEN 1000 MG: 500 TABLET ORAL at 09:48

## 2023-10-25 RX ADMIN — DOCUSATE SODIUM 100 MG: 100 CAPSULE, LIQUID FILLED ORAL at 17:38

## 2023-10-25 RX ADMIN — GABAPENTIN 800 MG: 400 CAPSULE ORAL at 17:37

## 2023-10-25 RX ADMIN — BUSPIRONE HYDROCHLORIDE 10 MG: 5 TABLET ORAL at 09:52

## 2023-10-25 RX ADMIN — GABAPENTIN 800 MG: 400 CAPSULE ORAL at 09:51

## 2023-10-25 RX ADMIN — APIXABAN 5 MG: 5 TABLET, FILM COATED ORAL at 09:52

## 2023-10-25 RX ADMIN — INSULIN LISPRO 3 UNITS: 100 INJECTION, SOLUTION INTRAVENOUS; SUBCUTANEOUS at 11:54

## 2023-10-25 RX ADMIN — METOPROLOL SUCCINATE 200 MG: 50 TABLET, EXTENDED RELEASE ORAL at 09:49

## 2023-10-25 RX ADMIN — IPRATROPIUM BROMIDE AND ALBUTEROL SULFATE 3 ML: 2.5; .5 SOLUTION RESPIRATORY (INHALATION) at 13:36

## 2023-10-25 RX ADMIN — PANTOPRAZOLE SODIUM 20 MG: 20 TABLET, DELAYED RELEASE ORAL at 09:51

## 2023-10-25 RX ADMIN — SERTRALINE HYDROCHLORIDE 50 MG: 50 TABLET ORAL at 09:49

## 2023-10-25 RX ADMIN — DICLOFENAC SODIUM 2 G: 10 GEL TOPICAL at 09:56

## 2023-10-25 RX ADMIN — TRAMADOL HYDROCHLORIDE 50 MG: 50 TABLET, COATED ORAL at 05:29

## 2023-10-25 RX ADMIN — GABAPENTIN 800 MG: 400 CAPSULE ORAL at 12:54

## 2023-10-25 RX ADMIN — TRAMADOL HYDROCHLORIDE 50 MG: 50 TABLET, COATED ORAL at 10:59

## 2023-10-25 RX ADMIN — INSULIN LISPRO 15 UNITS: 100 INJECTION, SOLUTION INTRAVENOUS; SUBCUTANEOUS at 08:11

## 2023-10-25 RX ADMIN — DIGOXIN 250 MCG: 125 TABLET ORAL at 09:49

## 2023-10-25 RX ADMIN — ACETAMINOPHEN 650 MG: 325 TABLET ORAL at 11:52

## 2023-10-25 RX ADMIN — GUAIFENESIN 600 MG: 600 TABLET, EXTENDED RELEASE ORAL at 17:38

## 2023-10-25 NOTE — PLAN OF CARE
Problem: INFECTION - ADULT  Goal: Absence or prevention of progression during hospitalization  Description: INTERVENTIONS:  - Assess and monitor for signs and symptoms of infection  - Monitor lab/diagnostic results  - Monitor all insertion sites, i.e. indwelling lines, tubes, and drains  - Monitor endotracheal if appropriate and nasal secretions for changes in amount and color  - [place] appropriate cooling/warming therapies per order  - Administer medications as ordered  - Instruct and encourage patient and family to use good hand hygiene technique  - Identify and instruct in appropriate isolation precautions for identified infection/condition  10/24/2023 2035 by Zhane Drake RN  Outcome: Progressing  10/24/2023 2033 by Zhane Drake RN  Outcome: Progressing     Problem: SAFETY ADULT  Goal: Patient will remain free of falls  Description: INTERVENTIONS:  - Educate patient/family on patient safety including physical limitations  - Instruct patient to call for assistance with activity   - Consult OT/PT to assist with strengthening/mobility   - Keep Call bell within reach  - Keep bed low and locked with side rails adjusted as appropriate  - Keep care items and personal belongings within reach  - Initiate and maintain comfort rounds  - Make Fall Risk Sign visible to staff  - Apply yellow socks and bracelet for high fall risk patients  - Consider moving patient to room near nurses station  10/24/2023 2035 by Zhane Drake RN  Outcome: Progressing  10/24/2023 2033 by Zhane Drake RN  Outcome: Progressing  Goal: Maintain or return to baseline ADL function  Description: INTERVENTIONS:  -  Assess patient's ability to carry out ADLs; assess patient's baseline for ADL function and identify physical deficits which impact ability to perform ADLs (bathing, care of mouth/teeth, toileting, grooming, dressing, etc.)  - Assess/evaluate cause of self-care deficits   - Assess range of motion  - Assess patient's mobility; develop plan if impaired  - Assess patient's need for assistive devices and provide as appropriate  - Encourage maximum independence but intervene and supervise when necessary  - Involve family in performance of ADLs  - Assess for home care needs following discharge   - Consider OT consult to assist with ADL evaluation and planning for discharge  - Provide patient education as appropriate  10/24/2023 2035 by Nestor Myles RN  Outcome: Progressing  10/24/2023 2033 by Nestor Myles RN  Outcome: Progressing  Goal: Maintains/Returns to pre admission functional level  Description: INTERVENTIONS:  - Perform BMAT or MOVE assessment daily.   - Set and communicate daily mobility goal to care team and patient/family/caregiver.    - Collaborate with rehabilitation services on mobility goals if consulted  - Out of bed for toileting  - Record patient progress and toleration of activity level   10/24/2023 2035 by Nestor Myles RN  Outcome: Progressing  10/24/2023 2033 by Nestor Myles RN  Outcome: Progressing     Problem: RESPIRATORY - ADULT  Goal: Achieves optimal ventilation and oxygenation  Description: INTERVENTIONS:  - Assess for changes in respiratory status  - Assess for changes in mentation and behavior  - Position to facilitate oxygenation and minimize respiratory effort  - Oxygen administered by appropriate delivery if ordered  - Initiate smoking cessation education as indicated  - Encourage broncho-pulmonary hygiene including cough, deep breathe, Incentive Spirometry  - Assess the need for suctioning and aspirate as needed  - Assess and instruct to report SOB or any respiratory difficulty  - Respiratory Therapy support as indicated  10/24/2023 2035 by Nestor Myles RN  Outcome: Progressing  10/24/2023 2033 by Nestor Myles RN  Outcome: Progressing     Problem: MOBILITY - ADULT  Goal: Maintain or return to baseline ADL function  Description: INTERVENTIONS:  -  Assess patient's ability to carry out ADLs; assess patient's baseline for ADL function and identify physical deficits which impact ability to perform ADLs (bathing, care of mouth/teeth, toileting, grooming, dressing, etc.)  - Assess/evaluate cause of self-care deficits   - Assess range of motion  - Assess patient's mobility; develop plan if impaired  - Assess patient's need for assistive devices and provide as appropriate  - Encourage maximum independence but intervene and supervise when necessary  - Involve family in performance of ADLs  - Assess for home care needs following discharge   - Consider OT consult to assist with ADL evaluation and planning for discharge  - Provide patient education as appropriate  10/24/2023 2035 by Zhane Drake RN  Outcome: Progressing  10/24/2023 2033 by Zhane Drake RN  Outcome: Progressing  Goal: Maintains/Returns to pre admission functional level  Description: INTERVENTIONS:  - Perform BMAT or MOVE assessment daily.   - Set and communicate daily mobility goal to care team and patient/family/caregiver. - Collaborate with rehabilitation services on mobility goals if consulted  - Out of bed for toileting  - Record patient progress and toleration of activity level   10/24/2023 2035 by Zhane Drake RN  Outcome: Progressing  10/24/2023 2033 by Zhane Drake RN  Outcome: Progressing     Problem: Nutrition/Hydration-ADULT  Goal: Nutrient/Hydration intake appropriate for improving, restoring or maintaining nutritional needs  Description: Monitor and assess patient's nutrition/hydration status for malnutrition. Collaborate with interdisciplinary team and initiate plan and interventions as ordered. Monitor patient's weight and dietary intake as ordered or per policy. Utilize nutrition screening tool and intervene as necessary. Determine patient's food preferences and provide high-protein, high-caloric foods as appropriate.      INTERVENTIONS:  - Monitor oral intake, urinary output, labs, and treatment plans  - Assess nutrition and hydration status and recommend course of action  - Evaluate amount of meals eaten  - Assist patient with eating if necessary   - Allow adequate time for meals  - Recommend/ encourage appropriate diets, oral nutritional supplements, and vitamin/mineral supplements  - Order, calculate, and assess calorie counts as needed  - Recommend, monitor, and adjust tube feedings and TPN/PPN based on assessed needs  - Assess need for intravenous fluids  - Provide specific nutrition/hydration education as appropriate  - Include patient/family/caregiver in decisions related to nutrition  Outcome: Progressing

## 2023-10-25 NOTE — PLAN OF CARE
Problem: Potential for Falls  Goal: Patient will remain free of falls  Description: INTERVENTIONS:  - Educate patient/family on patient safety including physical limitations  - Instruct patient to call for assistance with activity   - Consult OT/PT to assist with strengthening/mobility   - Keep Call bell within reach  - Keep bed low and locked with side rails adjusted as appropriate  - Keep care items and personal belongings within reach  - Initiate and maintain comfort rounds  - Make Fall Risk Sign visible to staff  - Apply yellow socks and bracelet for high fall risk patients  - Consider moving patient to room near nurses station  Outcome: Progressing     Problem: PAIN - ADULT  Goal: Verbalizes/displays adequate comfort level or baseline comfort level  Description: Interventions:  - Encourage patient to monitor pain and request assistance  - Assess pain using appropriate pain scale  - Administer analgesics based on type and severity of pain and evaluate response  - Implement non-pharmacological measures as appropriate and evaluate response  - Consider cultural and social influences on pain and pain management  - Notify physician/advanced practitioner if interventions unsuccessful or patient reports new pain  Outcome: Progressing     Problem: INFECTION - ADULT  Goal: Absence or prevention of progression during hospitalization  Description: INTERVENTIONS:  - Assess and monitor for signs and symptoms of infection  - Monitor lab/diagnostic results  - Monitor all insertion sites, i.e. indwelling lines, tubes, and drains  - Monitor endotracheal if appropriate and nasal secretions for changes in amount and color  - [place] appropriate cooling/warming therapies per order  - Administer medications as ordered  - Instruct and encourage patient and family to use good hand hygiene technique  - Identify and instruct in appropriate isolation precautions for identified infection/condition  Outcome: Progressing     Problem: SAFETY ADULT  Goal: Patient will remain free of falls  Description: INTERVENTIONS:  - Educate patient/family on patient safety including physical limitations  - Instruct patient to call for assistance with activity   - Consult OT/PT to assist with strengthening/mobility   - Keep Call bell within reach  - Keep bed low and locked with side rails adjusted as appropriate  - Keep care items and personal belongings within reach  - Initiate and maintain comfort rounds  - Make Fall Risk Sign visible to staff  - Apply yellow socks and bracelet for high fall risk patients  - Consider moving patient to room near nurses station  Outcome: Progressing  Goal: Maintain or return to baseline ADL function  Description: INTERVENTIONS:  -  Assess patient's ability to carry out ADLs; assess patient's baseline for ADL function and identify physical deficits which impact ability to perform ADLs (bathing, care of mouth/teeth, toileting, grooming, dressing, etc.)  - Assess/evaluate cause of self-care deficits   - Assess range of motion  - Assess patient's mobility; develop plan if impaired  - Assess patient's need for assistive devices and provide as appropriate  - Encourage maximum independence but intervene and supervise when necessary  - Involve family in performance of ADLs  - Assess for home care needs following discharge   - Consider OT consult to assist with ADL evaluation and planning for discharge  - Provide patient education as appropriate  Outcome: Progressing  Goal: Maintains/Returns to pre admission functional level  Description: INTERVENTIONS:  - Perform BMAT or MOVE assessment daily.   - Set and communicate daily mobility goal to care team and patient/family/caregiver.    - Collaborate with rehabilitation services on mobility goals if consulted  - Out of bed for toileting  - Record patient progress and toleration of activity level   Outcome: Progressing     Problem: RESPIRATORY - ADULT  Goal: Achieves optimal ventilation and oxygenation  Description: INTERVENTIONS:  - Assess for changes in respiratory status  - Assess for changes in mentation and behavior  - Position to facilitate oxygenation and minimize respiratory effort  - Oxygen administered by appropriate delivery if ordered  - Initiate smoking cessation education as indicated  - Encourage broncho-pulmonary hygiene including cough, deep breathe, Incentive Spirometry  - Assess the need for suctioning and aspirate as needed  - Assess and instruct to report SOB or any respiratory difficulty  - Respiratory Therapy support as indicated  Outcome: Progressing     Problem: MOBILITY - ADULT  Goal: Maintain or return to baseline ADL function  Description: INTERVENTIONS:  -  Assess patient's ability to carry out ADLs; assess patient's baseline for ADL function and identify physical deficits which impact ability to perform ADLs (bathing, care of mouth/teeth, toileting, grooming, dressing, etc.)  - Assess/evaluate cause of self-care deficits   - Assess range of motion  - Assess patient's mobility; develop plan if impaired  - Assess patient's need for assistive devices and provide as appropriate  - Encourage maximum independence but intervene and supervise when necessary  - Involve family in performance of ADLs  - Assess for home care needs following discharge   - Consider OT consult to assist with ADL evaluation and planning for discharge  - Provide patient education as appropriate  Outcome: Progressing  Goal: Maintains/Returns to pre admission functional level  Description: INTERVENTIONS:  - Perform BMAT or MOVE assessment daily.   - Set and communicate daily mobility goal to care team and patient/family/caregiver.    - Collaborate with rehabilitation services on mobility goals if consulted  - Out of bed for toileting  - Record patient progress and toleration of activity level   Outcome: Progressing

## 2023-10-25 NOTE — CASE MANAGEMENT
Case Management Discharge Planning Note    Patient name Garfield Raymond  Location 2 [place]  [place] 12 MRN [id number]  : 1960 Date 10/25/2023       Current Admission Date: 10/22/2023  Current Admission Diagnosis:Uncontrolled diabetes mellitus with hyperglycemia [facility])   Patient Active Problem List    Diagnosis Date Noted    Severe pain of left shoulder 2023    Severe pain 2023    Morbid (severe) obesity with alveolar hypoventilation ([address]) 2023    Acute sensory neuropathy 2023    Allergies 2023    Hepatosplenomegaly 2023    Ambulatory dysfunction 2023    Immunodeficiency, unspecified ([address]) 2022    Hypo-osmolality and hyponatremia 2022    Low back pain, unspecified 2022    Chronic lymphocytic leukemia of B-cell type in remission ([address]) 2022    Chronic obstructive pulmonary disease, unspecified ([address]) 2022    Chronic pain syndrome 2022    Foraminal stenosis of lumbar region 2022    Lumbar post-laminectomy syndrome 2022    Lumbar radiculopathy 2022    Shortness of breath 2022    Dysuria 2021    Other intervertebral disc degeneration, lumbar region 2021    Bipolar disorder ([address]) 2021    Hypertensive heart and chronic kidney disease with heart failure and stage 1 through stage 4 chronic kidney disease, or unspecified chronic kidney disease ([address]) 2021    Paroxysmal atrial fibrillation ([address]) 2021    Chronic respiratory failure with hypoxia ([address]) 2021    Chronic kidney disease, stage 2 (mild) 2021    Atherosclerotic heart disease of native coronary artery without angina pectoris 2021    Peripheral neuropathy 2021    Muscle weakness (generalized) 2021    Platelets decreased ([address]) 2021    Acute on chronic diastolic congestive heart failure ([address]) 2020    Hyperlipidemia 10/29/2020    Nutritional anemia, unspecified  10/29/2020    Chest pain 10/11/2020    Simple chronic bronchitis ([address]) 10/11/2020    Hyperglycemia 05/01/2020    Transition of care performed with sharing of clinical summary 04/11/2020    Obstructive sleep apnea 02/02/2020    Chronic a-fib ([address]) 02/02/2020    H/O vitamin D deficiency 10/28/2019    Type 2 diabetes mellitus with complication, with long-term current use of insulin ([address]) 06/21/2019    Restrictive ventilatory defect 03/26/2019    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 ([address]) 03/25/2019    Lung disease, restrictive 11/21/2018    Chronic respiratory failure ([address]) 10/31/2018    Coronary artery disease 09/06/2017    Esophageal reflux 09/06/2017    ANA LILIA (acute kidney injury) ([address]) 03/20/2017    Chronic low back pain 11/30/2016    SHAVONNE and COPD overlap syndrome      Morbid obesity      Uncontrolled diabetes mellitus with hyperglycemia ([address]) 09/02/2016    Fatigue 09/02/2016    GERD 06/08/2016    Psychiatric disorder     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/25/2015    Erectile dysfunction of organic origin 08/25/2014    Essential hypertension 09/04/2012    Diabetic neuropathy ([address]) 09/04/2012    Panic disorder without agoraphobia 09/04/2012    Vitamin D deficiency 09/04/2012      LOS (days): 3  Geometric Mean LOS (GMLOS) (days): 2.70  Days to GMLOS:-0.2     OBJECTIVE:  Risk of Unplanned Readmission Score: 37.06         Current admission status: Inpatient   Preferred Pharmacy:   [address] [place] [address]  Phone: [phone] Fax: [phone]    Primary Care Provider: MANAS Payne    Primary Insurance: [address] [facility]  Secondary Insurance:     [address] Number: [id number]

## 2023-10-25 NOTE — [place] UNIVERSAL TRANSFER FORM
[place] UNIVERSAL TRANSFER FORM  (ALL ITEMS MUST BE COMPLETED)    1. [facility]SFER [facility] TO: [facility]    2. DATE OF TRANSFER: 10/25/2023                        TIME OF TRANSFER: 1730    3. PATIENT NAME: APOLINAR Herman      YOB: 1960                             GENDER: male    4. LANGUAGE:   English    5. PHYSICIAN NAME:  Samantha Hidalgo MD                   PHONE: [phone]    6. CODE STATUS: Level 1 - Full Code        Out of Hospital DNR Attached: No    7. :                                      :  Extended Emergency Contact Information  Primary Emergency Contact: Sergo Doe  Address: [address] [place] [address], [address] [place] [address]  Mobile Phone: [phone]  Relation: Friend  Secondary Emergency Contact: Rahul Caro  Mobile Phone: [phone]  Relation: [id number] [place] Representative/Proxy:  No           Legal Guardian:  No             NAME OF:           HEALTH CARE REPRESENTATIVE/PROXY:                                         OR           LEGAL GUARDIAN, IF NOT :                                               PHONE:  (Day)           (Night)                        (Cell)    8. REASON FOR TRANSFER: Decreased strength, Decreased endurance, Decreased mobility             V/S: /75 (BP Location: Right arm)   Pulse 76   Temp (!) 97 °F (36.1 °C) (Axillary)   Resp 16   Ht 5' 11" (1.803 m)   Wt 104 kg (229 lb)   SpO2 99%   BMI 31.94 kg/m²           PAIN: Yes, Site Back, chronic    9. PRIMARY DIAGNOSIS: COPD with acute exacerbation ([address])      Secondary Diagnosis:         Pacemaker: No      Internal Defib: No          Mental Health Diagnosis (if Applicable):    10. RESTRAINTS: No     11. RESPIRATORY NEEDS: Oxygen Device 3 L Nasal Canula, Bipap at bedtime,    12. ISOLATION/PRECAUTION: None    13. ALLERGY: Wellbutrin [bupropion]    14.  SENSORY:       Vision Glasses, Hearing Good , and Speech Clear    15. SKIN CONDITION: No Wounds    16. DIET: Special (describe)Diabetic    17. IV ACCESS: None    18. PERSONAL ITEMS SENT WITH PATIENT: Glasses, oxygen    19. ATTACHED DOCUMENTS: MUST ATTACH CURRENT MEDICATION INFORMATION Face Sheet    20. AT RISK ALERTS:None        HARM TO: N/A    21. WEIGHT BEARING STATUS:         Left Leg: Full        Right Leg: Full    22. MENTAL STATUS:Alert and Oriented    23. FUNCTION:        Walk: Self        Transfer: Self        Toilet: Self        Feed: Self    24. IMMUNIZATIONS/SCREENING:     Immunization History   Administered Date(s) Administered    COVID-19 J&J ("[employer] vaccine 0.5 mL 04/12/2021    COVID-19 MODERNA VACC 0.5 ML IM 01/24/2022, 01/24/2022    COVID-19 Moderna Vac BIVALENT 12 Yr+ IM 0.5 ML 09/29/2022, 09/29/2022    COVID-19 PFIZER VACCINE 0.3 ML IM 09/29/2022    Hep B, adult 12/22/2008, 03/26/2009, 12/08/2009    INFLUENZA 10/03/2022, 10/12/2022    Influenza Quadrivalent Preservative Free 3 years and older IM 09/25/2017    Influenza, high dose seasonal 0.7 mL 11/26/2021    Influenza, injectable, quadrivalent, preservative free 0.5 mL 10/08/2018    Influenza, recombinant, quadrivalent,injectable, preservative free 10/12/2020, 11/26/2021    Influenza, seasonal, injectable 10/14/2003, 12/28/2004, 10/14/2005, 10/29/2007, 11/14/2008, 10/05/2009, 01/05/2011, 09/28/2011, 10/26/2012, 09/04/2013, 10/11/2014, 09/08/2015, 09/28/2016    MMR 12/04/2008, 03/26/2009    Meningococcal, Unknown Serogroups 12/22/2008    Pneumococcal Conjugate 13-Valent 09/08/2015, 11/29/2016, 11/29/2016    Pneumococcal Polysaccharide PPV23 10/14/2003, 10/14/2003    Tdap 12/04/2008    Tuberculin Skin Test 11/29/2021, 04/28/2022, 05/07/2022    Tuberculin Skin Test-PPD Intradermal 10/30/2007, 05/14/2009, 06/02/2009, 04/20/2010, 05/04/2010, 11/29/2021, 04/28/2022, 05/07/2022    Unknown 04/12/2021       25. BOWEL: Continent    26. BLADDER: Continent    27.  SENDING FACILITY CONTACT: Parvin Kelly                   Title: RN        Unit: [address]        Phone: [phone] [address] (if known):        Title:        Unit:         Phone:         FORM PREFILLED BY (if applicable)       Title:       Unit:        Phone:         FORM COMPLETED BY Percy Goldmann, RN      Title:       Phone:

## 2023-10-25 NOTE — PLAN OF CARE
Problem: Potential for Falls  Goal: Patient will remain free of falls  Description: INTERVENTIONS:  - Educate patient/family on patient safety including physical limitations  - Instruct patient to call for assistance with activity   - Consult OT/PT to assist with strengthening/mobility   - Keep Call bell within reach  - Keep bed low and locked with side rails adjusted as appropriate  - Keep care items and personal belongings within reach  - Initiate and maintain comfort rounds  - Make Fall Risk Sign visible to staff  - Offer Toileting every 2 Hours, in advance of need  - Initiate/Maintain bed alarm  - Obtain necessary fall risk management equipment: non slip socks, call bell in reach  Problem: SAFETY ADULT  Goal: Maintain or return to baseline ADL function  Description: INTERVENTIONS:  -  Assess patient's ability to carry out ADLs; assess patient's baseline for ADL function and identify physical deficits which impact ability to perform ADLs (bathing, care of mouth/teeth, toileting, grooming, dressing, etc.)  - Assess/evaluate cause of self-care deficits   - Assess range of motion  - Assess patient's mobility; develop plan if impaired  - Assess patient's need for assistive devices and provide as appropriate  - Encourage maximum independence but intervene and supervise when necessary  - Involve family in performance of ADLs  - Assess for home care needs following discharge   - Consider OT consult to assist with ADL evaluation and planning for discharge  - Provide patient education as appropriate  Outcome: Progressing     - Apply yellow socks and bracelet for high fall risk patients  - Consider moving patient to room near nurses station  Outcome: Progressing

## 2023-10-25 NOTE — PLAN OF CARE
Problem: Potential for Falls  Goal: Patient will remain free of falls  Description: INTERVENTIONS:  - Educate patient/family on patient safety including physical limitations  - Instruct patient to call for assistance with activity   - Consult OT/PT to assist with strengthening/mobility   - Keep Call bell within reach  - Keep bed low and locked with side rails adjusted as appropriate  - Keep care items and personal belongings within reach  - Initiate and maintain comfort rounds  - Make Fall Risk Sign visible to staff  - Offer Toileting every 2 Hours, in advance of need  - Initiate/Maintain bed alarm  - Obtain necessary fall risk management equipment: call bell within reach, non   Problem: SAFETY ADULT  Goal: Maintain or return to baseline ADL function  Description: INTERVENTIONS:  -  Assess patient's ability to carry out ADLs; assess patient's baseline for ADL function and identify physical deficits which impact ability to perform ADLs (bathing, care of mouth/teeth, toileting, grooming, dressing, etc.)  - Assess/evaluate cause of self-care deficits   - Assess range of motion  - Assess patient's mobility; develop plan if impaired  - Assess patient's need for assistive devices and provide as appropriate  - Encourage maximum independence but intervene and supervise when necessary  - Involve family in performance of ADLs  - Assess for home care needs following discharge   - Consider OT consult to assist with ADL evaluation and planning for discharge  - Provide patient education as appropriate  Outcome: Progressing   slip foot wear  - Apply yellow socks and bracelet for high fall risk patients  - Consider moving patient to room near nurses station  Outcome: Progressing

## 2023-10-25 NOTE — CASE MANAGEMENT
Case Management Discharge Planning Note    Patient name Vish Fairchild  Location 2 [facility] 201/2 [facility] 61 51 81 MRN [id number]  : 1960 Date 10/25/2023       Current Admission Date: 10/22/2023  Current Admission Diagnosis:Uncontrolled diabetes mellitus with hyperglycemia [facility])   Patient Active Problem List    Diagnosis Date Noted    Severe pain of left shoulder 2023    Severe pain 2023    Morbid (severe) obesity with alveolar hypoventilation ([address]) 2023    Acute sensory neuropathy 2023    Allergies 2023    Hepatosplenomegaly 2023    Ambulatory dysfunction 2023    Immunodeficiency, unspecified ([address]) 2022    Hypo-osmolality and hyponatremia 2022    Low back pain, unspecified 2022    Chronic lymphocytic leukemia of B-cell type in remission ([address]) 2022    Chronic obstructive pulmonary disease, unspecified ([address]) 2022    Chronic pain syndrome 2022    Foraminal stenosis of lumbar region 2022    Lumbar post-laminectomy syndrome 2022    Lumbar radiculopathy 2022    Shortness of breath 2022    Dysuria 2021    Other intervertebral disc degeneration, lumbar region 2021    Bipolar disorder ([address]) 2021    Hypertensive heart and chronic kidney disease with heart failure and stage 1 through stage 4 chronic kidney disease, or unspecified chronic kidney disease ([address]) 2021    Paroxysmal atrial fibrillation ([address]) 2021    Chronic respiratory failure with hypoxia ([address]) 2021    Chronic kidney disease, stage 2 (mild) 2021    Atherosclerotic heart disease of native coronary artery without angina pectoris 2021    Peripheral neuropathy 2021    Muscle weakness (generalized) 2021    Platelets decreased ([address]) 2021    Acute on chronic diastolic congestive heart failure ([address]) 2020    Hyperlipidemia 10/29/2020    Nutritional anemia, unspecified  10/29/2020    Chest pain 10/11/2020    Simple chronic bronchitis ([address]) 10/11/2020    Hyperglycemia 05/01/2020    Transition of care performed with sharing of clinical summary 04/11/2020    Obstructive sleep apnea 02/02/2020    Chronic a-fib ([address]) 02/02/2020    H/O vitamin D deficiency 10/28/2019    Type 2 diabetes mellitus with complication, with long-term current use of insulin ([address]) 06/21/2019    Restrictive ventilatory defect 03/26/2019    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 ([address]) 03/25/2019    Lung disease, restrictive 11/21/2018    Chronic respiratory failure ([address]) 10/31/2018    Coronary artery disease 09/06/2017    Esophageal reflux 09/06/2017    ANA LILIA (acute kidney injury) ([address]) 03/20/2017    Chronic low back pain 11/30/2016    SHAVONNE and COPD overlap syndrome      Morbid obesity      Uncontrolled diabetes mellitus with hyperglycemia ([address]) 09/02/2016    Fatigue 09/02/2016    GERD 06/08/2016    Psychiatric disorder     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/25/2015    Erectile dysfunction of organic origin 08/25/2014    Essential hypertension 09/04/2012    Diabetic neuropathy ([address]) 09/04/2012    Panic disorder without agoraphobia 09/04/2012    Vitamin D deficiency 09/04/2012      LOS (days): 3  Geometric Mean LOS (GMLOS) (days): 2.70  Days to GMLOS:0     OBJECTIVE:  Risk of Unplanned Readmission Score: 36.99     Current admission status: Inpatient   Preferred Pharmacy:   [address] [place] [address]  Phone: [phone] Fax: [phone]    Primary Care Provider: MANAS Payne    Primary Insurance: [id number] [facility]  Secondary Insurance:     DISCHARGE DETAILS:    Discharge planning discussed with[de-identified] Patient  Freedom of Choice: Yes  Comments - Freedom of Choice: SW following to assist with DCP.   STR placement at [facility] is planned pending insurance authorization approval.  SW met with pt to review plan and IMM. Pt requesting transfer to facility when discharged and is aware of discharge timeframe. CM contacted family/caregiver?: No- see comments (per pt he will be contacting his Abebeson Chaparroers, to bring clothing to facility)    Other Referral/Resources/Interventions Provided:  Interventions: Short Term Rehab  Referral Comments: STR at [facility] is planned pending insurance authorization approval.  CM Discharge Support submitted authorization request earlier today. Treatment Team Recommendation: Short Term Rehab  Discharge Destination Plan[de-identified] Short Term Rehab  Transport at Discharge : Wheelchair [facility]    IMM Given (Date):: 10/25/23  IMM Given to[de-identified] Patient (IMM #2 reviewed with pt. Pt verbalized understanding and agrees with discharge determination. Pt signed IMM and copy given.  Copy also placed in scan bin for chart.)

## 2023-10-25 NOTE — NURSING NOTE
Pt discharged to [facility] to be transported to [address] [facility]. Report given to Hnany Richmond. Pt agreed he had all of his belongings with him. Went over discharge instructions with pt. Verbalized understanding. Pt stated he had no questions. IV removed. Clean, dry dressing applied. Masimo removed.

## 2023-10-25 NOTE — CASE MANAGEMENT
Case Management Discharge Planning Note    Patient name Amina Found  Location 2 [place] 12 MRN [id number]  : 1960 Date 10/25/2023       Current Admission Date: 10/22/2023  Current Admission Diagnosis:Uncontrolled diabetes mellitus with hyperglycemia [facility])   Patient Active Problem List    Diagnosis Date Noted    Severe pain of left shoulder 2023    Severe pain 2023    Morbid (severe) obesity with alveolar hypoventilation ([address]) 2023    Acute sensory neuropathy 2023    Allergies 2023    Hepatosplenomegaly 2023    Ambulatory dysfunction 2023    Immunodeficiency, unspecified ([address]) 2022    Hypo-osmolality and hyponatremia 2022    Low back pain, unspecified 2022    Chronic lymphocytic leukemia of B-cell type in remission ([address]) 2022    Chronic obstructive pulmonary disease, unspecified ([address]) 2022    Chronic pain syndrome 2022    Foraminal stenosis of lumbar region 2022    Lumbar post-laminectomy syndrome 2022    Lumbar radiculopathy 2022    Shortness of breath 2022    Dysuria 2021    Other intervertebral disc degeneration, lumbar region 2021    Bipolar disorder ([address]) 2021    Hypertensive heart and chronic kidney disease with heart failure and stage 1 through stage 4 chronic kidney disease, or unspecified chronic kidney disease ([address]) 2021    Paroxysmal atrial fibrillation ([address]) 2021    Chronic respiratory failure with hypoxia ([address]) 2021    Chronic kidney disease, stage 2 (mild) 2021    Atherosclerotic heart disease of native coronary artery without angina pectoris 2021    Peripheral neuropathy 2021    Muscle weakness (generalized) 2021    Platelets decreased ([address]) 2021    Acute on chronic diastolic congestive heart failure ([address]) 2020    Hyperlipidemia 10/29/2020    Nutritional anemia, unspecified  10/29/2020    Chest pain 10/11/2020    Simple chronic bronchitis ([address]) 10/11/2020    Hyperglycemia 05/01/2020    Transition of care performed with sharing of clinical summary 04/11/2020    Obstructive sleep apnea 02/02/2020    Chronic a-fib ([address]) 02/02/2020    H/O vitamin D deficiency 10/28/2019    Type 2 diabetes mellitus with complication, with long-term current use of insulin ([address]) 06/21/2019    Restrictive ventilatory defect 03/26/2019    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 ([address]) 03/25/2019    Lung disease, restrictive 11/21/2018    Chronic respiratory failure ([address]) 10/31/2018    Coronary artery disease 09/06/2017    Esophageal reflux 09/06/2017    ANA LILIA (acute kidney injury) ([address]) 03/20/2017    Chronic low back pain 11/30/2016    SHAVONNE and COPD overlap syndrome      Morbid obesity      Uncontrolled diabetes mellitus with hyperglycemia ([address]) 09/02/2016    Fatigue 09/02/2016    GERD 06/08/2016    Psychiatric disorder     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/25/2015    Erectile dysfunction of organic origin 08/25/2014    Essential hypertension 09/04/2012    Diabetic neuropathy ([address]) 09/04/2012    Panic disorder without agoraphobia 09/04/2012    Vitamin D deficiency 09/04/2012      LOS (days): 3  Geometric Mean LOS (GMLOS) (days): 2.70  Days to GMLOS:-0.2     OBJECTIVE:  Risk of Unplanned Readmission Score: 37.06     Current admission status: Inpatient   Preferred Pharmacy:   [address] [place] [address]  Phone: [phone] Fax: [phone]    Primary Care Provider: MANAS Payne    Primary Insurance: [id number] [facility]  Secondary Insurance:     DISCHARGE DETAILS:    Other Referral/Resources/Interventions Provided:  Interventions: Short Term Rehab  Referral Comments: Discharge planned today. Pt will be transferred to [facility] for skilled rehab. Authorization approval was received from [employer]. Transportation has been requested. Pt, RN and facility aware of plan.     Treatment Team Recommendation: Short Term Rehab  Discharge Destination Plan[de-identified] Short Term Rehab  Transport at Discharge : Wheelchair [facility] (with personal POC)  Dispatcher Contacted: Yes  Number/Name of Dispatcher: Roundtrip  Transported by [employer] and Unit #):  (pending)  ETA of Transport (Date): 10/25/23  ETA of Transport (Time): [phone] (requested)        Accepting Facility Name, [address] [place] [address] : [facility] [place]  Receiving Facility/Agency Phone Number: [phone]

## 2023-10-25 NOTE — CASE MANAGEMENT
[facility] has received approved authorization from insurance: [place] MCKEON# [phone]  Authorization received for: SNF  Facility: [facility] [address] #:[id number]    Start of Care: 10/25/2023  Next Review Date: 11/01/2023  Continued Stay Care Coordinator: Thiago MCKEON# [phone]  Submit next review to: F#: [phone]   Care Manager notified:  Danielle Boothe

## 2023-10-25 NOTE — DISCHARGE SUMMARY
Discharge Summary - [address] [place] [address] Family Medicine Residency     Patient Information: Clarke Meigs 58 y.o. male MRN: [id number]  Unit/Bed#: [facility] Encounter: [id number]     Admitting Physician: Celia Salomon DO  Discharging Physician/Practitioner: Kodak aNranjo MD   PCP: MANAS Payne  Admission Date:   Admission Orders (From admission, onward)       Ordered        10/22/23 1836  INPATIENT ADMISSION  Once                          Discharge Date: 10/25/23      Reason for Admission: Cough [R05.9]  SOB (shortness of breath) [R06.02]  COPD with acute exacerbation (HCC) [J44.1]  Chronic lymphocytic leukemia of B-cell type in remission ([address]) [C91.11]     Discharge Diagnoses:      Principal Problem:    COPD with acute exacerbation ([address])  Active Problems:    SIRS (systemic inflammatory response syndrome) ([address])    Chronic a-fib ([address])    Uncontrolled diabetes mellitus with hyperglycemia (HCC)    Hyponatremia    Bipolar disorder ([facility])    Low back pain, unspecified    Chronic lymphocytic leukemia of B-cell type in remission ([address])    Ambulatory dysfunction    Hypomagnesemia    Electrolyte imbalance  Resolved Problems:    * No resolved hospital problems. *       Consultations During Hospital Stay:  -Oncology     Procedures Performed:   -none     Significant Findings / Test Results:   -10/22:   WBC 40.40, Na 132, glucose 358, , procal WNL, magnesium WNL, flu/COVID/RSV negative, urine Legionella negative, strep pneumoniae negative  Chest x-ray negative for focal consolidation, pleural effusion, pneumothorax    -10/23:  WBC 33.91, , glucose 233, magnesium WNL, digoxin level WNL    CT chest with contrast:  Mild interstitial edema. Nothing to indicate pneumonia. Chronic collapse of the trachea which can be seen with tracheomalacia or can be a normal variant.    -10/24:   WBC 34.79, , glucose 275, IgG 431    Incidental Findings:   -CT chest with contrast:  Chronic collapse of the trachea which can be seen with tracheomalacia or can be a normal variant. Test Results Pending at Discharge (will require follow up):   -none        Outpatient follow-up Requested:  -PCP  -Hematology    Complications:  none    Things to address at first visit after hospitalization   -Have you been improving symptomatically?  -Are you tolerating your medications well? Hospital Course:      Kasandra Rojas is a 58 y.o. male patient who originally presented to the hospital on 10/22/2023 due to coughing, SOB, night sweats lasting one week. Past medical history includes COPD on 3 L at baseline, CLL without treatment, atrial fibrillation anticoagulated with Eliquis, type 2 diabetes on insulin, bipolar disorder. Pt reports that approximately 1 week ago, he noticed that he began to develop "cold symptoms". He notes that he had increased work of breathing with worsening shortness breath, night sweats, loss of appetite, cough, "bronchial trouble". He notes that he had started to use his nebulizer every 2 hours with minimal relief. Patient be seen by PCP on 10/19/2023. Symptoms have been worsening since that point. Throughout hospitalization, pt responded appropriately to IV steroid and nebulizer treatments. He noted symptomatic improvement in breathing. Pt is appropriate, stable, and agreeable for discharge to acute rehab. * COPD with acute exacerbation ([address])  Assessment & Plan  Acute on chronic. On home O2 3L. Imaging shows no evidence of pneumonia or pleural effusion, just mild interstitial edema and chronic collapse of the trachea. Strep pneumo and legionella negative    No overnight events. Patient notes improvement in SOB but still complaining of cough with exertion and fatigue. Sat 98% on 3L .      Plan:   Changed IV Solu-medrol 40 mg Q12H  Completed Azithromycin 500mg IV (10/22 - 10/24)   Continue home inhaler regimen  Continue Duoneb Q6H  Continue PRN albuterol nebulizer Q6H for wheezing and SOB  Continue Mucinex BID  Monitor O2 and titrate as needed to keep SpO2 88-92%    SIRS (systemic inflammatory response syndrome) ([facility])  Assessment & Plan  Improving. POA leukocytosis and tachypnea. No known source of infection. WBC 34.79, elevated likely due to his underlying CLL. [facility] down [facility]   Completed Azithromycin 500 mg IV (10/22-10/24)  BC negative     Chronic a-fib (HCC)  Assessment & Plan  Chronic, stable. EKG in ED confirmed a-fib WITHOUT RVR. Digoxin level wnl    Plan:   -Continue home med eliquis 5 mg BID  -Continue metoprolol 200 mg daily  -Continue digoxin 250 mcg daily      Electrolyte imbalance  Assessment & Plan  Chronic, stable. K on admission 4.8. Home medications include aldactone and kdur 20 mEq. AM electrolytes within normal range   Continue to monitor     Hypomagnesemia  Assessment & Plan  Chronic, stable. Mag level normal   Continue home med mag-ox 400 mg once daily  Continue to monitor     Ambulatory dysfunction  Assessment & Plan  Chronic, stable. Patient is home bound requiring in-home visits. Plan:   -PT treat and eval  -Falls precautions    Chronic lymphocytic leukemia of B-cell type in remission [facility])  Assessment & Plan  Chronic, pt follows with Dr Donnie Rios, was told he doesn't need to be on treatment. IgG 431    WBC elevated likely due to CLL  Oncology consulted, recommend outpatient follow up     Low back pain, unspecified  Assessment & Plan  Chronic, stable. Home medications include Voltaren gel and Tramadol. Plan:  - Continue voltaren gel   - Continue tylenol 650 mg q6h scheduled for back pain  - Started home med tramadol 50mg q6 PRN     Bipolar disorder (HCC)  Assessment & Plan  Chronic, stable. Home medications include Buspar 10 mg, seroquel 100 mg in AM, seroquel 300 mg HS, zoloft 50 mg, lamotrigine 25 mg    Hyponatremia  Assessment & Plan  Chronic, baseline approximately 132.     AM Na 130, Corrected Na 133 (for hyperglycemia)  Continue sodium chloride 1 gram tablet in AM    Uncontrolled diabetes mellitus with hyperglycemia [facility])  Assessment & Plan  Lab Results   Component Value Date    HGBA1C 9.8 (H) 10/19/2023       Recent Labs     10/24/23  0710 10/24/23  1117 10/24/23  1605 10/24/23  2040   POCGLU 282* 366* 307* 318*       Blood Sugar Average: Last 72 hrs:  (P) 293.2    Chronic, home medications include Glargine 50 U daily, humalog 15U with mealtime, metformin 1000 mg BID. BG still elevated likely due to steroids. Anticipate improvement blood sugars as patient will be transitioned to p.o. steroids. Continue to monitor BG throughout day, if still elevated will consider increasing PM insulin dose   Continue home regimen Lantus 50 units twice daily. Patient will also resume metformin upon discharge. Continue Lispro 15U with meals  ISS, algorithm 3   Hypoglycemia protocol              Condition at Discharge: stable      Discharge Day Visit / Exam:      Vitals: Blood Pressure: 112/58 (10/24/23 2251)  Pulse: 67 (10/24/23 2251)  Temperature: 97.7 °F (36.5 °C) (10/24/23 2251)  Temp Source: Axillary (10/24/23 1904)  Respirations: 15 (10/24/23 2251)  Height: 5' 11" (180.3 cm) (10/22/23 1616)  Weight - Scale: 104 kg (229 lb 6.4 oz) (10/24/23 0600)  SpO2: 98 % (10/24/23 2251)  Exam:   Physical Exam  Vitals reviewed. Constitutional:       General: He is not in acute distress. Appearance: Normal appearance. He is obese. He is not ill-appearing or toxic-appearing. HENT:      Head: Normocephalic and atraumatic. Eyes:      Conjunctiva/sclera: Conjunctivae normal.   Cardiovascular:      Rate and Rhythm: Normal rate. Rhythm irregular. Pulses: Normal pulses. Heart sounds: Normal heart sounds. No murmur heard. Pulmonary:      Effort: Pulmonary effort is normal. No respiratory distress. Breath sounds: No wheezing. Comments: Distant breath sounds bilaterally  Abdominal:      General: Abdomen is flat.  Bowel sounds are normal. There is no distension. Palpations: Abdomen is soft. There is no mass. Tenderness: There is no abdominal tenderness. There is no guarding. Musculoskeletal:         General: Tenderness (lower back) present. Right lower leg: No edema. Left lower leg: No edema. Skin:     General: Skin is warm and dry. Neurological:      Mental Status: He is alert and oriented to person, place, and time. Mental status is at baseline. Patient Information Sharing: With the consent of Dylon Engel, their loved ones were notified today by inpatient team of the patient’s condition and current plan. All questions answered. Discharge instructions/Information to patient and family:   See after visit summary for information provided to patient and family. Discharge Medications:     Medication List      CONTINUE taking these medications     Adjustable Lancing Device Misc; USE AS DIRECTED   Alcohol Prep 70 % Pads; Apply topically 4 (four) times a day As directed   * [place] Choice Comfort EZ 33G X 4 MM Misc; Generic drug: Insulin Pen   Needle   * [place] Choice Comfort EZ 33G X 4 MM Misc; Generic drug: Insulin Pen   Needle; USE TO INJECT INSULIN 5 TIMES A DAY   * B-D ULTRAFINE III SHORT PEN 31G X 8 MM Misc; Generic drug: Insulin Pen   Needle   * Unifine SafeControl Pen Needle 30G X 5 MM Misc; Generic drug: Insulin   Pen Needle   * Insulin Pen Needle 31G X 5 MM Misc; Inject under the skin 4 (four)   times a day   * FreeStyle Sheba 14 Day Sensor Misc; Use 1 application. every 14   (fourteen) days   * FreeStyle Sheba 2 Sensor Misc; Check blood sugars multiple times per   day   * FreeStyle Sheba 14 Day Sensor Misc; Use as directed-   onetouch ultrasoft lancets; test blood sugar 3 (three) times a day   AppThwack System w/Device Kit  * This list has 8 medication(s) that are the same as other medications   prescribed for you.  Read the directions carefully, and ask your doctor or   other care provider to review them with you. ASK your doctor about these medications     acetaminophen 325 mg tablet; Commonly known as: TYLENOL; Take 2 tablets   (650 mg total) by mouth every 6 (six) hours as needed for mild pain,   headaches or fever   * albuterol (2.5 mg/3 mL) 0.083 % nebulizer solution; TAKE 1 VIAL (2.5   MG TOTAL) BY NEBULIZATION EVERY 6 (SIX) HOURS AS NEEDED FOR WHEEZING   * albuterol 90 mcg/act inhaler; Commonly known as: Ventolin HFA; Inhale   2 puffs every 6 (six) hours as needed for wheezing   apixaban 5 mg; Commonly known as: Eliquis; Take 1 tablet (5 mg total) by   mouth 2 (two) times a day   aspirin 81 MG tablet   atorvastatin 10 mg tablet; Commonly known as: LIPITOR; Take 1 tablet (10   mg total) by mouth daily   busPIRone 10 mg tablet; Commonly known as: BUSPAR; TAKE ONE TABLET BY   MOUTH TWICE DAILY   CENTRUM SILVER 50+MEN PO   Chest Congestion Relief DM  mg/5 mL oral syrup; Generic drug:   dextromethorphan-guaiFENesin; TAKE 5 ML BY MOUTH 3 (THREE) TIMES A DAY AS   NEEDED FOR COUGH OR CONGESTION FOR UP TO 10 DAYS   cholecalciferol 1,000 units tablet; Commonly known as: VITAMIN D3; Take   1 tablet (1,000 Units total) by mouth daily   Diclofenac Sodium 1 %; Commonly known as: VOLTAREN; Apply 2 g topically   4 (four) times a day for 14 days   digoxin 0.25 mg tablet; Commonly known as: LANOXIN; Take 1 tablet (250   mcg total) by mouth daily   fluticasone 50 mcg/act nasal spray; Commonly known as: FLONASE; 1 spray   into each nostril daily Do not start before May 12, 2023. fluticasone-umeclidinium-vilanterol 100-62.5-25 MCG/INH inhaler;   Commonly known as: TRELEGY; Inhale 1 puff daily Rinse mouth after use.   gabapentin 800 mg tablet; Commonly known as: Neurontin;  Take 1 tablet   (800 mg total) by mouth 4 (four) times a day   Icosapent Ethyl 1 g Caps; TAKE 2 CAPSULES TWICE A DAY   Insulin Glargine Solostar 100 UNIT/ML Sopn; Commonly known as: Lantus   SoloStar; Inject 0.5 mL (50 Units total) under the skin 2 (two) times a   day   insulin lispro 100 units/mL injection pen; Commonly known as: HumaLOG   KwikPen; Inject 15 Units under the skin 3 (three) times a day with meals   lamoTRIgine 25 mg tablet; Commonly known as: LaMICtal   losartan 50 mg tablet; Commonly known as: COZAAR; Take 1 tablet (50 mg   total) by mouth daily   magnesium 84 MG (7MEQ) Tbcr; Commonly known as: Farrell Jeans; Take 1 tablet   (84 mg total) by mouth daily   metFORMIN 1000 MG tablet; Commonly known as: GLUCOPHAGE; Take 1 tablet   (1,000 mg total) by mouth 2 (two) times a day with meals   metoprolol succinate 200 MG 24 hr tablet; Commonly known as: TOPROL-XL; Take 1 tablet (200 mg total) by mouth daily   omeprazole 20 mg delayed release capsule; Commonly known as: PriLOSEC; Take 1 capsule (20 mg total) by mouth daily   pantoprazole 40 mg tablet; Commonly known as: PROTONIX; TAKE 1 TABLET   DAILY   polyethylene glycol 4000 mL solution; Commonly known as: GOLYTELY; Take   4,000 mL by mouth once for 1 dose   potassium chloride 20 mEq tablet; Commonly known as: K-DUR,KLOR-CON; Take 1 tablet (20 mEq total) by mouth daily   predniSONE 20 mg tablet; 2 tabs daily x 4 days, 1 1/2 tab daily x 4   days, 1 tab daily x 4 days then 1/2 tab daily x 4 day. s   * QUEtiapine 300 mg tablet; Commonly known as: SEROquel   * QUEtiapine 100 mg tablet; Commonly known as: SEROquel; Take 1 tablet   (100 mg total) by mouth daily at bedtime   * QUEtiapine 300 mg tablet; Commonly known as: SEROquel;  Take 1 tablet   (300 mg total) by mouth daily at bedtime   ReliOn True Metrix Test Strips test strip; Generic drug: glucose blood;   Use as instructed   sertraline 50 mg tablet; Commonly known as: ZOLOFT; Take 1 tablet (50 mg   total) by mouth daily Daily at bedtime   sodium chloride 1 g tablet; TAKE 1 TABLET DAILY IN THE MORNING   spironolactone 25 mg tablet; Commonly known as: ALDACTONE; Take 1 tablet   (25 mg total) by mouth daily   tiZANidine 4 mg tablet; Commonly known as: Diamond George; Take 1 tablet (4 mg   total) by mouth every 8 (eight) hours as needed for muscle spasms   traMADol 50 mg tablet; Commonly known as: ULTRAM; Take 1 tablet (50 mg   total) by mouth every 6 (six) hours as needed for moderate pain   Victoza injection; Generic drug: liraglutide; INJECT 0.3ML UNDER THE   SKIN EVERY MORNING   vitamin C 1000 MG tablet  * This list has 5 medication(s) that are the same as other medications   prescribed for you. Read the directions carefully, and ask your doctor or   other care provider to review them with you. Disposition:   Acute Rehab at [facility]     For Discharges to [facility] SNF:   ·       Not Applicable to this Patient - Not Applicable to this Patient     Discharge Statement:  I spent 45 minutes discharging the patient. This time was spent on the day of discharge. I had direct contact with the patient on the day of discharge. Greater than 50% of the total time was spent examining patient, answering all patient questions, arranging and discussing plan of care with patient as well as directly providing post-discharge instructions. Additional time then spent on discharge activities.      ** Please Note: This note has been constructed using a voice recognition system **     Tomer Drummond DO   10/25/23  12:04 AM

## 2023-10-25 NOTE — CASE MANAGEMENT
[facility] received request for authorization from Care Manager.   Authorization request for: SNF  Facility Name: [facility] NPI: [id number]  Facility MD: Dr. Lo Perez NPI: [id number]  Authorization initiated by contacting insurance: Iván Del Real Via: [employer]     Pending Reference #: [id number]   Clinicals [facility]: [facility]

## 2023-10-26 ENCOUNTER — PATIENT OUTREACH (OUTPATIENT)
Dept: FAMILY MEDICINE CLINIC | Facility: CLINIC | Age: 63
End: 2023-10-26

## 2023-10-26 ENCOUNTER — TELEPHONE (OUTPATIENT)
Dept: FAMILY MEDICINE CLINIC | Facility: CLINIC | Age: 63
End: 2023-10-26

## 2023-10-26 NOTE — PROGRESS NOTES
Outpatient care management referral via HRR report 10/26/23. Discharged to [facility] 10/25/23. SNF/STR Surveillance 10/26/2023. Confirmed on [facility]. Email sent to facility. This Admin Coordinator will continue to monitor via chart review.

## 2023-10-26 NOTE — UTILIZATION REVIEW
NOTIFICATION OF ADMISSION DISCHARGE   This is a Notification of Discharge from [address] [facility]. Please be advised that this patient has been discharge from our facility. Below you will find the admission and discharge date and time including the patient’s disposition. UTILIZATION REVIEW CONTACT:  Marizol Garcia  Utilization   Network Utilization Review Department  Phone: [phone] x carefully listen to the prompts. All voicemails are confidential.  Email: [email or web address]     ADMISSION INFORMATION  PRESENTATION DATE: 10/22/2023  4:16 PM  OBERVATION ADMISSION DATE:   INPATIENT ADMISSION DATE: 10/22/23  6:37 PM   DISCHARGE DATE: 10/25/2023  7:28 PM   DISPOSITION:[facility] SNF/TCU/SNU    Network Utilization Review Department  ATTENTION: Please call with any questions or concerns to [phone] and carefully listen to the prompts so that you are directed to the right person. All voicemails are confidential.   For Discharge needs, contact Care Management DC Support Team at [phone] opt. 2  Send all requests for admission clinical reviews, approved or denied determinations and any other requests to dedicated fax number below belonging to the campus where the patient is receiving treatment.  List of dedicated fax numbers for the Facilities:  [place] DENIALS (Administrative/Medical Necessity) [phone]   DISCHARGE SUPPORT TEAM (Network) [phone] [facility] [phone]   [address] [facility] [address] [place] [address] [address]  [facility] [phone]

## 2023-10-28 LAB
BACTERIA BLD CULT: NORMAL
BACTERIA BLD CULT: NORMAL

## 2023-10-30 ENCOUNTER — TELEPHONE (OUTPATIENT)
Dept: HEMATOLOGY ONCOLOGY | Facility: CLINIC | Age: 63
End: 2023-10-30

## 2023-10-30 ENCOUNTER — DOCUMENTATION (OUTPATIENT)
Dept: HEMATOLOGY ONCOLOGY | Facility: MEDICAL CENTER | Age: 63
End: 2023-10-30

## 2023-10-30 NOTE — PROGRESS NOTES
Dr. Dot Peralta would like to see patient sooner. Voicemail was left that we have an opening on Wed. 11/2/23 at 11:20am.  I asked Alonzo to call back to confirm.

## 2023-10-30 NOTE — TELEPHONE ENCOUNTER
Patient Call    Who are you speaking with? Patient    If it is not the patient, are they listed on an active communication consent form? Yes   What is the reason for this call? Can appmt be virtual?   Does this require a call back? Yes   If a call back is required, please list [facility] call back number [phone] , ok to leave message   If a call back is required, advise that a message will be forwarded to their care team and someone will return their call as soon as possible. Did you relay this information to the patient?  Yes

## 2023-11-01 ENCOUNTER — HOSPITAL ENCOUNTER (EMERGENCY)
Facility: HOSPITAL | Age: 63
Discharge: NURSING, REHAB OR LONG-TERM CARE | End: 2023-11-02
Attending: EMERGENCY MEDICINE
Payer: COMMERCIAL

## 2023-11-01 ENCOUNTER — DOCUMENTATION (OUTPATIENT)
Dept: HEMATOLOGY ONCOLOGY | Facility: MEDICAL CENTER | Age: 63
End: 2023-11-01

## 2023-11-01 ENCOUNTER — TELEPHONE (OUTPATIENT)
Dept: HEMATOLOGY ONCOLOGY | Facility: MEDICAL CENTER | Age: 63
End: 2023-11-01

## 2023-11-01 ENCOUNTER — APPOINTMENT (EMERGENCY)
Dept: RADIOLOGY | Facility: HOSPITAL | Age: 63
End: 2023-11-01
Payer: COMMERCIAL

## 2023-11-01 ENCOUNTER — TELEMEDICINE (OUTPATIENT)
Dept: HEMATOLOGY ONCOLOGY | Facility: MEDICAL CENTER | Age: 63
End: 2023-11-01
Payer: COMMERCIAL

## 2023-11-01 DIAGNOSIS — R53.1 WEAKNESS: Primary | ICD-10-CM

## 2023-11-01 DIAGNOSIS — D80.3 IGG DEFICIENCY (HCC): Primary | ICD-10-CM

## 2023-11-01 DIAGNOSIS — C91.11 CHRONIC LYMPHOCYTIC LEUKEMIA OF B-CELL TYPE IN REMISSION (HCC): ICD-10-CM

## 2023-11-01 DIAGNOSIS — R53.83 FATIGUE, UNSPECIFIED TYPE: ICD-10-CM

## 2023-11-01 LAB
2HR DELTA HS TROPONIN: -1 NG/L
ALBUMIN SERPL BCP-MCNC: 4 G/DL (ref 3.5–5)
ALP SERPL-CCNC: 82 U/L (ref 34–104)
ALT SERPL W P-5'-P-CCNC: 64 U/L (ref 7–52)
ANION GAP SERPL CALCULATED.3IONS-SCNC: 11 MMOL/L
AST SERPL W P-5'-P-CCNC: 28 U/L (ref 13–39)
BACTERIA UR QL AUTO: NORMAL /HPF
BASOPHILS # BLD MANUAL: 0 THOUSAND/UL (ref 0–0.1)
BASOPHILS NFR MAR MANUAL: 0 % (ref 0–1)
BILIRUB SERPL-MCNC: 0.4 MG/DL (ref 0.2–1)
BILIRUB UR QL STRIP: NEGATIVE
BUN SERPL-MCNC: 20 MG/DL (ref 5–25)
CALCIUM SERPL-MCNC: 9.1 MG/DL (ref 8.4–10.2)
CARDIAC TROPONIN I PNL SERPL HS: 6 NG/L
CARDIAC TROPONIN I PNL SERPL HS: 7 NG/L
CHLORIDE SERPL-SCNC: 91 MMOL/L (ref 96–108)
CLARITY UR: CLEAR
CO2 SERPL-SCNC: 27 MMOL/L (ref 21–32)
COLOR UR: ABNORMAL
CREAT SERPL-MCNC: 0.88 MG/DL (ref 0.6–1.3)
EOSINOPHIL # BLD MANUAL: 0.55 THOUSAND/UL (ref 0–0.4)
EOSINOPHIL NFR BLD MANUAL: 1 % (ref 0–6)
ERYTHROCYTE [DISTWIDTH] IN BLOOD BY AUTOMATED COUNT: 12.2 % (ref 11.6–15.1)
FLUAV RNA RESP QL NAA+PROBE: NEGATIVE
FLUBV RNA RESP QL NAA+PROBE: NEGATIVE
GFR SERPL CREATININE-BSD FRML MDRD: 90 ML/MIN/1.73SQ M
GLUCOSE SERPL-MCNC: 239 MG/DL (ref 65–140)
GLUCOSE SERPL-MCNC: 370 MG/DL (ref 65–140)
GLUCOSE SERPL-MCNC: 376 MG/DL (ref 65–140)
GLUCOSE UR STRIP-MCNC: ABNORMAL MG/DL
HCT VFR BLD AUTO: 43.8 % (ref 36.5–49.3)
HGB BLD-MCNC: 15 G/DL (ref 12–17)
HGB UR QL STRIP.AUTO: NEGATIVE
KETONES UR STRIP-MCNC: NEGATIVE MG/DL
LEUKOCYTE ESTERASE UR QL STRIP: ABNORMAL
LYMPHOCYTES # BLD AUTO: 40.97 THOUSAND/UL (ref 0.6–4.47)
LYMPHOCYTES # BLD AUTO: 74 % (ref 14–44)
MCH RBC QN AUTO: 32.9 PG (ref 26.8–34.3)
MCHC RBC AUTO-ENTMCNC: 34.2 G/DL (ref 31.4–37.4)
MCV RBC AUTO: 96 FL (ref 82–98)
MONOCYTES # BLD AUTO: 0 THOUSAND/UL (ref 0–1.22)
MONOCYTES NFR BLD: 0 % (ref 4–12)
MYELOCYTES NFR BLD MANUAL: 2 % (ref 0–1)
NEUTROPHILS # BLD MANUAL: 12.73 THOUSAND/UL (ref 1.85–7.62)
NEUTS SEG NFR BLD AUTO: 23 % (ref 43–75)
NITRITE UR QL STRIP: NEGATIVE
NON-SQ EPI CELLS URNS QL MICRO: NORMAL /HPF
PH UR STRIP.AUTO: 6.5 [PH]
PLATELET # BLD AUTO: 207 THOUSANDS/UL (ref 149–390)
PLATELET BLD QL SMEAR: ADEQUATE
PMV BLD AUTO: 8.9 FL (ref 8.9–12.7)
POTASSIUM SERPL-SCNC: 5.6 MMOL/L (ref 3.5–5.3)
PROT SERPL-MCNC: 6.5 G/DL (ref 6.4–8.4)
PROT UR STRIP-MCNC: NEGATIVE MG/DL
RBC # BLD AUTO: 4.56 MILLION/UL (ref 3.88–5.62)
RBC #/AREA URNS AUTO: NORMAL /HPF
RBC MORPH BLD: PRESENT
RSV RNA RESP QL NAA+PROBE: NEGATIVE
SARS-COV-2 RNA RESP QL NAA+PROBE: NEGATIVE
SMUDGE CELLS BLD QL SMEAR: PRESENT
SODIUM SERPL-SCNC: 129 MMOL/L (ref 135–147)
SP GR UR STRIP.AUTO: <=1.005 (ref 1–1.03)
UROBILINOGEN UR QL STRIP.AUTO: 0.2 E.U./DL
WBC # BLD AUTO: 55.36 THOUSAND/UL (ref 4.31–10.16)
WBC #/AREA URNS AUTO: NORMAL /HPF

## 2023-11-01 PROCEDURE — 85027 COMPLETE CBC AUTOMATED: CPT | Performed by: EMERGENCY MEDICINE

## 2023-11-01 PROCEDURE — 81001 URINALYSIS AUTO W/SCOPE: CPT | Performed by: EMERGENCY MEDICINE

## 2023-11-01 PROCEDURE — 71045 X-RAY EXAM CHEST 1 VIEW: CPT

## 2023-11-01 PROCEDURE — 36415 COLL VENOUS BLD VENIPUNCTURE: CPT | Performed by: EMERGENCY MEDICINE

## 2023-11-01 PROCEDURE — 0241U HB NFCT DS VIR RESP RNA 4 TRGT: CPT | Performed by: EMERGENCY MEDICINE

## 2023-11-01 PROCEDURE — 80053 COMPREHEN METABOLIC PANEL: CPT | Performed by: EMERGENCY MEDICINE

## 2023-11-01 PROCEDURE — 99285 EMERGENCY DEPT VISIT HI MDM: CPT | Performed by: EMERGENCY MEDICINE

## 2023-11-01 PROCEDURE — 82948 REAGENT STRIP/BLOOD GLUCOSE: CPT

## 2023-11-01 PROCEDURE — 93005 ELECTROCARDIOGRAM TRACING: CPT

## 2023-11-01 PROCEDURE — 85007 BL SMEAR W/DIFF WBC COUNT: CPT | Performed by: EMERGENCY MEDICINE

## 2023-11-01 PROCEDURE — 96372 THER/PROPH/DIAG INJ SC/IM: CPT

## 2023-11-01 PROCEDURE — 84484 ASSAY OF TROPONIN QUANT: CPT | Performed by: EMERGENCY MEDICINE

## 2023-11-01 PROCEDURE — 99213 OFFICE O/P EST LOW 20 MIN: CPT | Performed by: INTERNAL MEDICINE

## 2023-11-01 PROCEDURE — 94640 AIRWAY INHALATION TREATMENT: CPT

## 2023-11-01 PROCEDURE — 99285 EMERGENCY DEPT VISIT HI MDM: CPT

## 2023-11-01 RX ORDER — SODIUM CHLORIDE 9 MG/ML
20 INJECTION, SOLUTION INTRAVENOUS ONCE
Status: CANCELLED | OUTPATIENT
Start: 2023-11-02

## 2023-11-01 RX ORDER — DIPHENHYDRAMINE HCL 25 MG
50 TABLET ORAL ONCE
Status: CANCELLED | OUTPATIENT
Start: 2023-11-02

## 2023-11-01 RX ORDER — TRAMADOL HYDROCHLORIDE 50 MG/1
50 TABLET ORAL ONCE
Status: COMPLETED | OUTPATIENT
Start: 2023-11-01 | End: 2023-11-01

## 2023-11-01 RX ORDER — IPRATROPIUM BROMIDE AND ALBUTEROL SULFATE 2.5; .5 MG/3ML; MG/3ML
3 SOLUTION RESPIRATORY (INHALATION) ONCE
Status: COMPLETED | OUTPATIENT
Start: 2023-11-01 | End: 2023-11-01

## 2023-11-01 RX ORDER — ACETAMINOPHEN 325 MG/1
650 TABLET ORAL ONCE
Status: CANCELLED | OUTPATIENT
Start: 2023-11-02

## 2023-11-01 RX ADMIN — IPRATROPIUM BROMIDE AND ALBUTEROL SULFATE 3 ML: .5; 3 SOLUTION RESPIRATORY (INHALATION) at 22:42

## 2023-11-01 RX ADMIN — INSULIN HUMAN 12 UNITS: 100 INJECTION, SOLUTION PARENTERAL at 21:19

## 2023-11-01 RX ADMIN — TRAMADOL HYDROCHLORIDE 50 MG: 50 TABLET, COATED ORAL at 21:24

## 2023-11-01 NOTE — PROGRESS NOTES
I left voicemail for Alonzo that his follow up with Dr. Morena Jj has been scheduled for 3/4/24 at 1:20pm and that an infusion  will be contacting him to schedule tx.
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
## 2023-11-02 ENCOUNTER — TELEPHONE (OUTPATIENT)
Dept: HEMATOLOGY ONCOLOGY | Facility: CLINIC | Age: 63
End: 2023-11-02

## 2023-11-02 VITALS
SYSTOLIC BLOOD PRESSURE: 157 MMHG | OXYGEN SATURATION: 98 % | HEART RATE: 72 BPM | RESPIRATION RATE: 18 BRPM | DIASTOLIC BLOOD PRESSURE: 82 MMHG | TEMPERATURE: 98.7 F

## 2023-11-02 PROCEDURE — 94640 AIRWAY INHALATION TREATMENT: CPT

## 2023-11-02 RX ORDER — IPRATROPIUM BROMIDE AND ALBUTEROL SULFATE 2.5; .5 MG/3ML; MG/3ML
3 SOLUTION RESPIRATORY (INHALATION) ONCE
Status: COMPLETED | OUTPATIENT
Start: 2023-11-02 | End: 2023-11-02

## 2023-11-02 RX ADMIN — IPRATROPIUM BROMIDE AND ALBUTEROL SULFATE 3 ML: .5; 3 SOLUTION RESPIRATORY (INHALATION) at 02:17
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
## 2023-11-02 NOTE — TELEPHONE ENCOUNTER
Spoke with patient  Patient has decided againt IVIG infusions  "There is no way I will be going out for awhile"  Will d/c order and update Dr Jackson Steward

## 2023-11-03 ENCOUNTER — PATIENT OUTREACH (OUTPATIENT)
Dept: CASE MANAGEMENT | Facility: OTHER | Age: 63
End: 2023-11-03

## 2023-11-03 LAB
QRS AXIS: 61 DEGREES
QRSD INTERVAL: 86 MS
QT INTERVAL: 344 MS
QTC INTERVAL: 404 MS
T WAVE AXIS: 44 DEGREES
VENTRICULAR RATE: 83 BPM

## 2023-11-03 PROCEDURE — 93010 ELECTROCARDIOGRAM REPORT: CPT | Performed by: INTERNAL MEDICINE

## 2023-11-03 NOTE — PROGRESS NOTES
Chart review complete Update obtained from [facility]). The patient is currently admitted to the SNF and is receiving skilled therapies No LCD at this time. This Admin Coordinator will continue to monitor via chart review.

## 2023-11-09 ENCOUNTER — PATIENT OUTREACH (OUTPATIENT)
Dept: FAMILY MEDICINE CLINIC | Facility: CLINIC | Age: 63
End: 2023-11-09

## 2023-11-09 NOTE — PROGRESS NOTES
Received  from [address] [place] [address] [address] from [facility]. Returned call. LVM requesting return call. RN CM contact information provided.

## 2023-11-10 ENCOUNTER — PATIENT OUTREACH (OUTPATIENT)
Dept: CASE MANAGEMENT | Facility: OTHER | Age: 63
End: 2023-11-10

## 2023-11-10 ENCOUNTER — TELEPHONE (OUTPATIENT)
Dept: FAMILY MEDICINE CLINIC | Facility: CLINIC | Age: 63
End: 2023-11-10

## 2023-11-10 DIAGNOSIS — Z79.4 TYPE 2 DIABETES MELLITUS WITH COMPLICATION, WITH LONG-TERM CURRENT USE OF INSULIN (HCC): Primary | ICD-10-CM

## 2023-11-10 DIAGNOSIS — E11.8 TYPE 2 DIABETES MELLITUS WITH COMPLICATION, WITH LONG-TERM CURRENT USE OF INSULIN (HCC): Primary | ICD-10-CM

## 2023-11-10 NOTE — PROGRESS NOTES
Update obtained from [facility] the patient discharged 11/9/23 to Home. I have removed myself off of the care team, added the CM to the care team who will follow the patient through the episode, sent the care manager an inbasket notifying them of the HRR Referal.  Ambulatory referral placed for complex care management. A email was sent to the facility requesting discharge instructions. When Jag Ward has received the Discharge paperwork  Admin Coordinator will attach to this encounter.

## 2023-11-13 ENCOUNTER — PATIENT OUTREACH (OUTPATIENT)
Dept: FAMILY MEDICINE CLINIC | Facility: CLINIC | Age: 63
End: 2023-11-13

## 2023-11-13 NOTE — PROGRESS NOTES
Attempted to return call to [address] [place] [address]. Left voice message requesting return call. RN CM will remove self off of care team at this time. Pt is being followed by oncology .

## 2023-11-14 ENCOUNTER — IN HOME VISIT (OUTPATIENT)
Dept: FAMILY MEDICINE CLINIC | Facility: CLINIC | Age: 63
End: 2023-11-14
Payer: COMMERCIAL

## 2023-11-14 DIAGNOSIS — I50.9 CHRONIC CONGESTIVE HEART FAILURE, UNSPECIFIED HEART FAILURE TYPE (HCC): Primary | ICD-10-CM

## 2023-11-14 DIAGNOSIS — J18.9 PNEUMONIA DUE TO INFECTIOUS ORGANISM, UNSPECIFIED LATERALITY, UNSPECIFIED PART OF LUNG: ICD-10-CM

## 2023-11-14 DIAGNOSIS — E87.6 HYPOKALEMIA: ICD-10-CM

## 2023-11-14 PROBLEM — Z74.1 NEED FOR ASSISTANCE WITH PERSONAL CARE: Status: ACTIVE | Noted: 2023-10-25

## 2023-11-14 PROBLEM — R26.89 OTHER ABNORMALITIES OF GAIT AND MOBILITY: Status: ACTIVE | Noted: 2023-10-25

## 2023-11-14 PROBLEM — R27.9 UNSPECIFIED LACK OF COORDINATION: Status: ACTIVE | Noted: 2023-10-25

## 2023-11-14 PROBLEM — R48.8 OTHER SYMBOLIC DYSFUNCTIONS: Status: ACTIVE | Noted: 2023-10-25

## 2023-11-14 PROCEDURE — 99349 HOME/RES VST EST MOD MDM 40: CPT | Performed by: NURSE PRACTITIONER

## 2023-11-14 RX ORDER — BUMETANIDE 1 MG/1
1 TABLET ORAL 2 TIMES DAILY
Qty: 60 TABLET | Refills: 3 | Status: SHIPPED | OUTPATIENT
Start: 2023-11-14 | End: 2023-12-14

## 2023-11-14 RX ORDER — CEPHALEXIN 500 MG/1
500 CAPSULE ORAL EVERY 8 HOURS SCHEDULED
Qty: 21 CAPSULE | Refills: 0 | Status: SHIPPED | OUTPATIENT
Start: 2023-11-14 | End: 2023-11-21

## 2023-11-14 RX ORDER — POTASSIUM CHLORIDE 20 MEQ/1
20 TABLET, EXTENDED RELEASE ORAL
Qty: 15 TABLET | Refills: 5 | Status: SHIPPED | OUTPATIENT
Start: 2023-11-14 | End: 2023-12-14

## 2023-11-14 NOTE — PROGRESS NOTES
Assessment/Plan:     Diagnoses and all orders for this visit:    Chronic congestive heart failure, unspecified heart failure type (HCC)  -     bumetanide (BUMEX) 1 mg tablet; Take 1 tablet (1 mg total) by mouth 2 (two) times a day    Hypokalemia  -     potassium chloride (K-DUR,KLOR-CON) 20 mEq tablet; Take 1 tablet (20 mEq total) by mouth every other day    Pneumonia due to infectious organism, unspecified laterality, unspecified part of lung  -     cephalexin (KEFLEX) 500 mg capsule; Take 1 capsule (500 mg total) by mouth every 8 (eight) hours for 7 days      Subjective:      Patient ID: Gisele Frost is a 58 y.o. male. Patient seen at home today s/p dc from [facility]. He has plus 2 pitting edema left leg. No edema right. His VS are stable. His FBS past 4 days 258/262/266/262. Blood glucose elevated -patient less controlled with diet when returns home. Start bumex bid. Stop K daily and will check level. Start QOD as has been elevated. Started abt for URI. Will have labs drawn by VN. Patient will continue to monitor BG and report to me. Patient is home bound. He has leukemia. > 40 minutes was spent with patient on detailed history and physical exam and assessment, planning and diagnosing and education. The following portions of the patient's history were reviewed and updated as appropriate: allergies, current medications, past family history, past medical history, past social history, past surgical history, and problem list.    Review of Systems   Constitutional: Negative. HENT: Negative. Eyes: Negative. Respiratory:  Positive for cough and shortness of breath. Cardiovascular:  Positive for leg swelling. Gastrointestinal: Negative. Endocrine: Negative. Genitourinary: Negative. Musculoskeletal: Negative. Skin: Negative. Allergic/Immunologic: Negative. Hematological: Negative. Psychiatric/Behavioral: Negative.            Objective:      /80   Pulse 70 Temp 97.9 °F (36.6 °C)   Resp 18   SpO2 99%          Physical Exam  Constitutional:       General: He is not in acute distress. Appearance: Normal appearance. He is not ill-appearing, toxic-appearing or diaphoretic. HENT:      Head: Normocephalic and atraumatic. Right Ear: External ear normal.      Left Ear: External ear normal.      Nose: Nose normal. No congestion. Mouth/Throat:      Mouth: Mucous membranes are moist.      Pharynx: Oropharynx is clear. Eyes:      General:         Right eye: No discharge. Left eye: No discharge. Conjunctiva/sclera: Conjunctivae normal.   Cardiovascular:      Rate and Rhythm: Normal rate and regular rhythm. Pulses: no weak pulses          Posterior tibial pulses are 1+ on the right side and 2+ on the left side. Heart sounds: Normal heart sounds. Pulmonary:      Effort: Pulmonary effort is normal. No respiratory distress. Breath sounds: No stridor. Rhonchi present. No wheezing or rales. Comments: Moist cough with scattered rhonchi  Abdominal:      General: Bowel sounds are normal.      Palpations: Abdomen is soft. Tenderness: There is no abdominal tenderness. There is no guarding. Musculoskeletal:      Cervical back: Normal range of motion. No rigidity. Right lower leg: No edema. Left lower leg: Edema present. Comments: Plus 2 LLE edema   Feet:      Right foot:      Skin integrity: No ulcer, skin breakdown, erythema, warmth, callus or dry skin. Left foot:      Skin integrity: No ulcer, skin breakdown, erythema, warmth, callus or dry skin. Skin:     General: Skin is warm and dry. Findings: No lesion or rash. Neurological:      General: No focal deficit present. Mental Status: He is alert and oriented to person, place, and time. Gait: Gait abnormal.   Psychiatric:         Mood and Affect: Mood normal.         Behavior: Behavior normal.         Thought Content:  Thought content normal. Judgment: Judgment normal.         Diabetic Foot Exam    Patient's shoes and socks removed. Right Foot/Ankle   Right Foot Inspection  Skin Exam: skin normal and skin intact. No dry skin, no warmth, no callus, no erythema, no maceration, no abnormal color, no pre-ulcer, no ulcer and no callus. Toe Exam: swelling. Sensory   Monofilament testing: intact    Vascular  The right PT pulse is 1+. Left Foot/Ankle  Left Foot Inspection  Skin Exam: skin normal and skin intact. No dry skin, no warmth, no erythema, no maceration, normal color, no pre-ulcer, no ulcer and no callus. Toe Exam: ROM and strength within normal limits. Sensory   Monofilament testing: intact    Vascular  The left PT pulse is 2+.      Assign Risk Category  No deformity present  Loss of protective sensation  No weak pulses  Risk: 1

## 2023-11-17 ENCOUNTER — TELEPHONE (OUTPATIENT)
Dept: FAMILY MEDICINE CLINIC | Facility: CLINIC | Age: 63
End: 2023-11-17

## 2023-11-17 NOTE — TELEPHONE ENCOUNTER
VNA  Two different VNA forms  Scanned into Encounter  Placed in PC;P's folder  Skilled nursing, PT and OT  Fax: [phone]     Second form   Fax to [phone]

## 2023-11-17 NOTE — TELEPHONE ENCOUNTER
Received Interim Order/Plan of Care Change from [facility]NA. Certification period 11/10/23-01/08/24    Scanned copy into encounter. Placed in [id number] [employer] folder in precepting room.     Fax when complete: [phone]

## 2023-11-20 VITALS
SYSTOLIC BLOOD PRESSURE: 130 MMHG | TEMPERATURE: 97.9 F | OXYGEN SATURATION: 99 % | HEART RATE: 70 BPM | DIASTOLIC BLOOD PRESSURE: 80 MMHG | RESPIRATION RATE: 18 BRPM

## 2023-11-22 ENCOUNTER — TELEPHONE (OUTPATIENT)
Dept: PAIN MEDICINE | Facility: CLINIC | Age: 63
End: 2023-11-22

## 2023-11-22 DIAGNOSIS — J44.9 CHRONIC OBSTRUCTIVE PULMONARY DISEASE, UNSPECIFIED COPD TYPE (HCC): ICD-10-CM

## 2023-11-22 DIAGNOSIS — M79.18 MYOFASCIAL PAIN SYNDROME: ICD-10-CM

## 2023-11-22 DIAGNOSIS — J42 CHRONIC BRONCHITIS, UNSPECIFIED CHRONIC BRONCHITIS TYPE (HCC): ICD-10-CM

## 2023-11-22 DIAGNOSIS — F99 PSYCHIATRIC DISORDER: ICD-10-CM

## 2023-11-22 DIAGNOSIS — J41.0 SIMPLE CHRONIC BRONCHITIS (HCC): Chronic | ICD-10-CM

## 2023-11-22 RX ORDER — TIZANIDINE 4 MG/1
4 TABLET ORAL EVERY 8 HOURS PRN
Qty: 75 TABLET | Refills: 2 | OUTPATIENT
Start: 2023-11-22

## 2023-11-22 RX ORDER — GUAIFENESIN 600 MG/1
1200 TABLET, EXTENDED RELEASE ORAL EVERY 12 HOURS SCHEDULED
Qty: 56 TABLET | Refills: 0 | Status: SHIPPED | OUTPATIENT
Start: 2023-11-22 | End: 2023-12-06

## 2023-11-24 ENCOUNTER — APPOINTMENT (OUTPATIENT)
Dept: LAB | Facility: CLINIC | Age: 63
End: 2023-11-24
Payer: COMMERCIAL

## 2023-11-24 DIAGNOSIS — R52 SEVERE PAIN: ICD-10-CM

## 2023-11-24 DIAGNOSIS — J18.9 PNEUMONIA DUE TO INFECTIOUS ORGANISM, UNSPECIFIED LATERALITY, UNSPECIFIED PART OF LUNG: Primary | ICD-10-CM

## 2023-11-24 DIAGNOSIS — E87.6 HYPOKALEMIA: ICD-10-CM

## 2023-11-24 LAB
ALBUMIN SERPL BCP-MCNC: 3.7 G/DL (ref 3.5–5)
ALP SERPL-CCNC: 74 U/L (ref 34–104)
ALT SERPL W P-5'-P-CCNC: 23 U/L (ref 7–52)
ANION GAP SERPL CALCULATED.3IONS-SCNC: 12 MMOL/L
AST SERPL W P-5'-P-CCNC: 19 U/L (ref 13–39)
BASOPHILS # BLD AUTO: 0.08 THOUSANDS/ÂΜL (ref 0–0.1)
BASOPHILS NFR BLD AUTO: 0 % (ref 0–1)
BILIRUB SERPL-MCNC: 0.33 MG/DL (ref 0.2–1)
BUN SERPL-MCNC: 27 MG/DL (ref 5–25)
CALCIUM SERPL-MCNC: 8.7 MG/DL (ref 8.4–10.2)
CHLORIDE SERPL-SCNC: 96 MMOL/L (ref 96–108)
CO2 SERPL-SCNC: 27 MMOL/L (ref 21–32)
CREAT SERPL-MCNC: 1 MG/DL (ref 0.6–1.3)
EOSINOPHIL # BLD AUTO: 0.05 THOUSAND/ÂΜL (ref 0–0.61)
EOSINOPHIL NFR BLD AUTO: 0 % (ref 0–6)
ERYTHROCYTE [DISTWIDTH] IN BLOOD BY AUTOMATED COUNT: 13.2 % (ref 11.6–15.1)
GFR SERPL CREATININE-BSD FRML MDRD: 79 ML/MIN/1.73SQ M
GLUCOSE SERPL-MCNC: 319 MG/DL (ref 65–140)
HCT VFR BLD AUTO: 41.3 % (ref 36.5–49.3)
HGB BLD-MCNC: 13.3 G/DL (ref 12–17)
IMM GRANULOCYTES # BLD AUTO: 0.16 THOUSAND/UL (ref 0–0.2)
IMM GRANULOCYTES NFR BLD AUTO: 1 % (ref 0–2)
LYMPHOCYTES # BLD AUTO: 17.98 THOUSANDS/ÂΜL (ref 0.6–4.47)
LYMPHOCYTES NFR BLD AUTO: 67 % (ref 14–44)
MCH RBC QN AUTO: 31.9 PG (ref 26.8–34.3)
MCHC RBC AUTO-ENTMCNC: 32.2 G/DL (ref 31.4–37.4)
MCV RBC AUTO: 99 FL (ref 82–98)
MONOCYTES # BLD AUTO: 0.66 THOUSAND/ÂΜL (ref 0.17–1.22)
MONOCYTES NFR BLD AUTO: 2 % (ref 4–12)
NEUTROPHILS # BLD AUTO: 8.22 THOUSANDS/ÂΜL (ref 1.85–7.62)
NEUTS SEG NFR BLD AUTO: 30 % (ref 43–75)
NRBC BLD AUTO-RTO: 0 /100 WBCS
PLATELET # BLD AUTO: 179 THOUSANDS/UL (ref 149–390)
PMV BLD AUTO: 9.5 FL (ref 8.9–12.7)
POTASSIUM SERPL-SCNC: 4.2 MMOL/L (ref 3.5–5.3)
PROT SERPL-MCNC: 5.9 G/DL (ref 6.4–8.4)
RBC # BLD AUTO: 4.17 MILLION/UL (ref 3.88–5.62)
SODIUM SERPL-SCNC: 135 MMOL/L (ref 135–147)
WBC # BLD AUTO: 27.15 THOUSAND/UL (ref 4.31–10.16)

## 2023-11-24 PROCEDURE — 85025 COMPLETE CBC W/AUTO DIFF WBC: CPT

## 2023-11-24 PROCEDURE — 80053 COMPREHEN METABOLIC PANEL: CPT

## 2023-11-24 PROCEDURE — 36415 COLL VENOUS BLD VENIPUNCTURE: CPT

## 2023-11-24 RX ORDER — ALBUTEROL SULFATE 90 UG/1
2 AEROSOL, METERED RESPIRATORY (INHALATION) EVERY 6 HOURS PRN
Qty: 18 G | Refills: 5 | Status: SHIPPED | OUTPATIENT
Start: 2023-11-24

## 2023-11-24 RX ORDER — TRAMADOL HYDROCHLORIDE 50 MG/1
50 TABLET ORAL EVERY 6 HOURS PRN
Qty: 120 TABLET | Refills: 1 | Status: SHIPPED | OUTPATIENT
Start: 2023-11-24

## 2023-11-24 RX ORDER — ALBUTEROL SULFATE 2.5 MG/3ML
SOLUTION RESPIRATORY (INHALATION)
Qty: 375 ML | Refills: 5 | Status: SHIPPED | OUTPATIENT
Start: 2023-11-24

## 2023-11-27 ENCOUNTER — TELEPHONE (OUTPATIENT)
Dept: FAMILY MEDICINE CLINIC | Facility: CLINIC | Age: 63
End: 2023-11-27

## 2023-11-28 DIAGNOSIS — R05.1 ACUTE COUGH: ICD-10-CM

## 2023-11-28 DIAGNOSIS — J32.9 SINUSITIS, UNSPECIFIED CHRONICITY, UNSPECIFIED LOCATION: Primary | ICD-10-CM

## 2023-11-28 DIAGNOSIS — J42 CHRONIC BRONCHITIS, UNSPECIFIED CHRONIC BRONCHITIS TYPE (HCC): ICD-10-CM

## 2023-11-28 RX ORDER — BENZONATATE 100 MG/1
100 CAPSULE ORAL 3 TIMES DAILY PRN
Qty: 20 CAPSULE | Refills: 0 | Status: ON HOLD | OUTPATIENT
Start: 2023-11-28

## 2023-11-28 RX ORDER — SULFAMETHOXAZOLE AND TRIMETHOPRIM 800; 160 MG/1; MG/1
1 TABLET ORAL 2 TIMES DAILY
Qty: 28 TABLET | Refills: 0 | Status: ON HOLD | OUTPATIENT
Start: 2023-11-28 | End: 2023-12-12

## 2023-11-28 RX ORDER — GUAIFENESIN 600 MG/1
1200 TABLET, EXTENDED RELEASE ORAL EVERY 12 HOURS SCHEDULED
Qty: 56 TABLET | Refills: 0 | Status: ON HOLD | OUTPATIENT
Start: 2023-11-28 | End: 2023-12-12

## 2023-11-28 NOTE — PROGRESS NOTES
Spoke to patient on phone today. He is very congested in his sinuses. Stated tender around sinuses and continues to cough. Change antibiotic to bactrim and dc current abt. Patient believes his WBC count came down due to being on abt. He would like his CBC drawn after this course of abt to see if it comes down lower. CBC ordered. Patient is aware his WBC elevated due to CLL. VN will draw CBC when abt done.

## 2023-11-29 ENCOUNTER — TELEPHONE (OUTPATIENT)
Dept: FAMILY MEDICINE CLINIC | Facility: CLINIC | Age: 63
End: 2023-11-29

## 2023-11-30 ENCOUNTER — PATIENT OUTREACH (OUTPATIENT)
Dept: FAMILY MEDICINE CLINIC | Facility: CLINIC | Age: 63
End: 2023-11-30

## 2023-11-30 NOTE — PROGRESS NOTES
Referral received. Outreach to patient. Pt reports the following:    Has HHA 5 days a week 4 to 5 hours a day. Moms Stewart [place] CM. A1C 9.8 was drinking tea with honey but is no longer using honey. Pt reports he is taking medications as directed. Reviewed upcoming appointments. No additional needs identified. Pt is independent in managing care at this time. RN CM will reopen if new referral is received.

## 2023-12-04 ENCOUNTER — TELEMEDICINE (OUTPATIENT)
Dept: CARDIOLOGY CLINIC | Facility: CLINIC | Age: 63
End: 2023-12-04
Payer: COMMERCIAL

## 2023-12-04 VITALS
BODY MASS INDEX: 33.74 KG/M2 | OXYGEN SATURATION: 97 % | WEIGHT: 241 LBS | SYSTOLIC BLOOD PRESSURE: 180 MMHG | HEIGHT: 71 IN | HEART RATE: 97 BPM | DIASTOLIC BLOOD PRESSURE: 80 MMHG

## 2023-12-04 DIAGNOSIS — I13.0 HYPERTENSIVE HEART AND CHRONIC KIDNEY DISEASE WITH HEART FAILURE AND STAGE 1 THROUGH STAGE 4 CHRONIC KIDNEY DISEASE, OR UNSPECIFIED CHRONIC KIDNEY DISEASE (HCC): ICD-10-CM

## 2023-12-04 DIAGNOSIS — E66.2 MORBID (SEVERE) OBESITY WITH ALVEOLAR HYPOVENTILATION (HCC): ICD-10-CM

## 2023-12-04 DIAGNOSIS — J96.11 CHRONIC RESPIRATORY FAILURE WITH HYPOXIA (HCC): ICD-10-CM

## 2023-12-04 DIAGNOSIS — E11.8 TYPE 2 DIABETES MELLITUS WITH COMPLICATION, WITH LONG-TERM CURRENT USE OF INSULIN (HCC): Primary | ICD-10-CM

## 2023-12-04 DIAGNOSIS — I48.20 CHRONIC A-FIB (HCC): ICD-10-CM

## 2023-12-04 DIAGNOSIS — I10 BENIGN ESSENTIAL HYPERTENSION: ICD-10-CM

## 2023-12-04 DIAGNOSIS — I25.10 CORONARY ARTERY DISEASE INVOLVING NATIVE CORONARY ARTERY OF NATIVE HEART WITHOUT ANGINA PECTORIS: ICD-10-CM

## 2023-12-04 DIAGNOSIS — R07.9 CHEST PAIN: ICD-10-CM

## 2023-12-04 DIAGNOSIS — Z79.4 TYPE 2 DIABETES MELLITUS WITH COMPLICATION, WITH LONG-TERM CURRENT USE OF INSULIN (HCC): Primary | ICD-10-CM

## 2023-12-04 PROCEDURE — 99213 OFFICE O/P EST LOW 20 MIN: CPT | Performed by: NURSE PRACTITIONER
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
## 2023-12-08 ENCOUNTER — APPOINTMENT (EMERGENCY)
Dept: RADIOLOGY | Facility: HOSPITAL | Age: 63
DRG: 871 | End: 2023-12-08
Payer: COMMERCIAL

## 2023-12-08 ENCOUNTER — APPOINTMENT (INPATIENT)
Dept: RADIOLOGY | Facility: HOSPITAL | Age: 63
DRG: 871 | End: 2023-12-08
Payer: COMMERCIAL

## 2023-12-08 ENCOUNTER — HOSPITAL ENCOUNTER (INPATIENT)
Facility: HOSPITAL | Age: 63
LOS: 3 days | Discharge: HOME HEALTH | DRG: 871 | End: 2023-12-11
Attending: EMERGENCY MEDICINE | Admitting: STUDENT IN AN ORGANIZED HEALTH CARE EDUCATION/TRAINING PROGRAM
Payer: COMMERCIAL

## 2023-12-08 ENCOUNTER — TELEPHONE (OUTPATIENT)
Dept: FAMILY MEDICINE CLINIC | Facility: CLINIC | Age: 63
End: 2023-12-08

## 2023-12-08 DIAGNOSIS — M79.18 MYOFASCIAL PAIN SYNDROME: ICD-10-CM

## 2023-12-08 DIAGNOSIS — A41.9 SEPTIC SHOCK (HCC): ICD-10-CM

## 2023-12-08 DIAGNOSIS — M25.519 SHOULDER PAIN: ICD-10-CM

## 2023-12-08 DIAGNOSIS — R05.1 ACUTE COUGH: ICD-10-CM

## 2023-12-08 DIAGNOSIS — Z74.1 NEED FOR ASSISTANCE WITH PERSONAL CARE: ICD-10-CM

## 2023-12-08 DIAGNOSIS — J42 CHRONIC BRONCHITIS, UNSPECIFIED CHRONIC BRONCHITIS TYPE (HCC): ICD-10-CM

## 2023-12-08 DIAGNOSIS — J43.2 CENTRILOBULAR EMPHYSEMA (HCC): ICD-10-CM

## 2023-12-08 DIAGNOSIS — M25.512 SEVERE PAIN OF LEFT SHOULDER: ICD-10-CM

## 2023-12-08 DIAGNOSIS — J44.1 COPD WITH EXACERBATION (HCC): Chronic | ICD-10-CM

## 2023-12-08 DIAGNOSIS — U07.1 COVID: Primary | ICD-10-CM

## 2023-12-08 DIAGNOSIS — R26.2 AMBULATORY DYSFUNCTION: ICD-10-CM

## 2023-12-08 DIAGNOSIS — J44.9 COPD (CHRONIC OBSTRUCTIVE PULMONARY DISEASE) (HCC): ICD-10-CM

## 2023-12-08 DIAGNOSIS — F99 PSYCHIATRIC DISORDER: ICD-10-CM

## 2023-12-08 DIAGNOSIS — E66.2 MORBID (SEVERE) OBESITY WITH ALVEOLAR HYPOVENTILATION (HCC): ICD-10-CM

## 2023-12-08 DIAGNOSIS — R65.21 SEPTIC SHOCK (HCC): ICD-10-CM

## 2023-12-08 DIAGNOSIS — J18.9 PNEUMONIA: ICD-10-CM

## 2023-12-08 DIAGNOSIS — R57.9 SHOCK CIRCULATORY (HCC): ICD-10-CM

## 2023-12-08 LAB
2HR DELTA HS TROPONIN: -3 NG/L
4HR DELTA HS TROPONIN: -2 NG/L
ABO GROUP BLD: NORMAL
ALBUMIN SERPL BCP-MCNC: 4.1 G/DL (ref 3.5–5)
ALBUMIN SERPL BCP-MCNC: 4.1 G/DL (ref 3.5–5)
ALP SERPL-CCNC: 61 U/L (ref 34–104)
ALP SERPL-CCNC: 62 U/L (ref 34–104)
ALT SERPL W P-5'-P-CCNC: 21 U/L (ref 7–52)
ALT SERPL W P-5'-P-CCNC: 21 U/L (ref 7–52)
ANION GAP SERPL CALCULATED.3IONS-SCNC: 10 MMOL/L
ANION GAP SERPL CALCULATED.3IONS-SCNC: 13 MMOL/L
ANISOCYTOSIS BLD QL SMEAR: PRESENT
AST SERPL W P-5'-P-CCNC: 20 U/L (ref 13–39)
AST SERPL W P-5'-P-CCNC: 21 U/L (ref 13–39)
BACTERIA UR QL AUTO: ABNORMAL /HPF
BASOPHILS # BLD AUTO: 0.05 THOUSANDS/ÂΜL (ref 0–0.1)
BASOPHILS # BLD MANUAL: 0 THOUSAND/UL (ref 0–0.1)
BASOPHILS NFR BLD AUTO: 0 % (ref 0–1)
BASOPHILS NFR MAR MANUAL: 0 % (ref 0–1)
BETA-HYDROXYBUTYRATE: 0.2 MMOL/L
BILIRUB SERPL-MCNC: 0.57 MG/DL (ref 0.2–1)
BILIRUB SERPL-MCNC: 0.6 MG/DL (ref 0.2–1)
BILIRUB UR QL STRIP: NEGATIVE
BLD GP AB SCN SERPL QL: NEGATIVE
BNP SERPL-MCNC: 71 PG/ML (ref 0–100)
BUN SERPL-MCNC: 16 MG/DL (ref 5–25)
BUN SERPL-MCNC: 17 MG/DL (ref 5–25)
CALCIUM SERPL-MCNC: 9.2 MG/DL (ref 8.4–10.2)
CALCIUM SERPL-MCNC: 9.4 MG/DL (ref 8.4–10.2)
CARDIAC TROPONIN I PNL SERPL HS: 10 NG/L
CARDIAC TROPONIN I PNL SERPL HS: 12 NG/L
CARDIAC TROPONIN I PNL SERPL HS: 9 NG/L
CHLORIDE SERPL-SCNC: 90 MMOL/L (ref 96–108)
CHLORIDE SERPL-SCNC: 94 MMOL/L (ref 96–108)
CLARITY UR: CLEAR
CO2 SERPL-SCNC: 27 MMOL/L (ref 21–32)
CO2 SERPL-SCNC: 28 MMOL/L (ref 21–32)
COLOR UR: ABNORMAL
CREAT SERPL-MCNC: 1.05 MG/DL (ref 0.6–1.3)
CREAT SERPL-MCNC: 1.31 MG/DL (ref 0.6–1.3)
CRP SERPL QL: 1.5 MG/L
EOSINOPHIL # BLD AUTO: 0.02 THOUSAND/ÂΜL (ref 0–0.61)
EOSINOPHIL # BLD MANUAL: 0 THOUSAND/UL (ref 0–0.4)
EOSINOPHIL NFR BLD AUTO: 0 % (ref 0–6)
EOSINOPHIL NFR BLD MANUAL: 0 % (ref 0–6)
ERYTHROCYTE [DISTWIDTH] IN BLOOD BY AUTOMATED COUNT: 13 % (ref 11.6–15.1)
ERYTHROCYTE [DISTWIDTH] IN BLOOD BY AUTOMATED COUNT: 13.1 % (ref 11.6–15.1)
GFR SERPL CREATININE-BSD FRML MDRD: 57 ML/MIN/1.73SQ M
GFR SERPL CREATININE-BSD FRML MDRD: 74 ML/MIN/1.73SQ M
GLUCOSE SERPL-MCNC: 252 MG/DL (ref 65–140)
GLUCOSE SERPL-MCNC: 283 MG/DL (ref 65–140)
GLUCOSE SERPL-MCNC: 353 MG/DL (ref 65–140)
GLUCOSE SERPL-MCNC: 358 MG/DL (ref 65–140)
GLUCOSE SERPL-MCNC: 372 MG/DL (ref 65–140)
GLUCOSE SERPL-MCNC: 373 MG/DL (ref 65–140)
GLUCOSE SERPL-MCNC: 388 MG/DL (ref 65–140)
GLUCOSE UR STRIP-MCNC: ABNORMAL MG/DL
HCT VFR BLD AUTO: 41.8 % (ref 36.5–49.3)
HCT VFR BLD AUTO: 42.4 % (ref 36.5–49.3)
HGB BLD-MCNC: 14.4 G/DL (ref 12–17)
HGB BLD-MCNC: 14.7 G/DL (ref 12–17)
HGB UR QL STRIP.AUTO: ABNORMAL
IMM GRANULOCYTES # BLD AUTO: 0.11 THOUSAND/UL (ref 0–0.2)
IMM GRANULOCYTES NFR BLD AUTO: 0 % (ref 0–2)
KETONES UR STRIP-MCNC: NEGATIVE MG/DL
LACTATE SERPL-SCNC: 2 MMOL/L (ref 0.5–2)
LACTATE SERPL-SCNC: 2.4 MMOL/L (ref 0.5–2)
LEUKOCYTE ESTERASE UR QL STRIP: NEGATIVE
LYMPHOCYTES # BLD AUTO: 14.5 THOUSANDS/ÂΜL (ref 0.6–4.47)
LYMPHOCYTES # BLD AUTO: 16.07 THOUSAND/UL (ref 0.6–4.47)
LYMPHOCYTES # BLD AUTO: 46 % (ref 14–44)
LYMPHOCYTES NFR BLD AUTO: 58 % (ref 14–44)
MCH RBC QN AUTO: 32.5 PG (ref 26.8–34.3)
MCH RBC QN AUTO: 32.7 PG (ref 26.8–34.3)
MCHC RBC AUTO-ENTMCNC: 34.4 G/DL (ref 31.4–37.4)
MCHC RBC AUTO-ENTMCNC: 34.7 G/DL (ref 31.4–37.4)
MCV RBC AUTO: 94 FL (ref 82–98)
MCV RBC AUTO: 95 FL (ref 82–98)
MONOCYTES # BLD AUTO: 0.74 THOUSAND/ÂΜL (ref 0.17–1.22)
MONOCYTES # BLD AUTO: 1.13 THOUSAND/UL (ref 0–1.22)
MONOCYTES NFR BLD AUTO: 3 % (ref 4–12)
MONOCYTES NFR BLD: 4 % (ref 4–12)
NEUTROPHILS # BLD AUTO: 9.96 THOUSANDS/ÂΜL (ref 1.85–7.62)
NEUTROPHILS # BLD MANUAL: 11 THOUSAND/UL (ref 1.85–7.62)
NEUTS SEG NFR BLD AUTO: 39 % (ref 43–75)
NEUTS SEG NFR BLD AUTO: 39 % (ref 43–75)
NITRITE UR QL STRIP: NEGATIVE
NON-SQ EPI CELLS URNS QL MICRO: ABNORMAL /HPF
NRBC BLD AUTO-RTO: 0 /100 WBCS
PH UR STRIP.AUTO: 6 [PH]
PLATELET # BLD AUTO: 205 THOUSANDS/UL (ref 149–390)
PLATELET # BLD AUTO: 236 THOUSANDS/UL (ref 149–390)
PLATELET BLD QL SMEAR: ADEQUATE
PMV BLD AUTO: 9.2 FL (ref 8.9–12.7)
PMV BLD AUTO: 9.4 FL (ref 8.9–12.7)
POTASSIUM SERPL-SCNC: 4.1 MMOL/L (ref 3.5–5.3)
POTASSIUM SERPL-SCNC: 4.2 MMOL/L (ref 3.5–5.3)
PROCALCITONIN SERPL-MCNC: 0.06 NG/ML
PROT SERPL-MCNC: 6.7 G/DL (ref 6.4–8.4)
PROT SERPL-MCNC: 6.8 G/DL (ref 6.4–8.4)
PROT UR STRIP-MCNC: NEGATIVE MG/DL
QRS AXIS: 78 DEGREES
QRSD INTERVAL: 92 MS
QT INTERVAL: 398 MS
QTC INTERVAL: 447 MS
RBC # BLD AUTO: 4.41 MILLION/UL (ref 3.88–5.62)
RBC # BLD AUTO: 4.53 MILLION/UL (ref 3.88–5.62)
RBC #/AREA URNS AUTO: ABNORMAL /HPF
RBC MORPH BLD: PRESENT
RH BLD: POSITIVE
SARS-COV-2 RNA RESP QL NAA+PROBE: POSITIVE
SMUDGE CELLS BLD QL SMEAR: PRESENT
SODIUM SERPL-SCNC: 130 MMOL/L (ref 135–147)
SODIUM SERPL-SCNC: 132 MMOL/L (ref 135–147)
SP GR UR STRIP.AUTO: 1.01 (ref 1–1.03)
SPECIMEN EXPIRATION DATE: NORMAL
T WAVE AXIS: 71 DEGREES
UROBILINOGEN UR QL STRIP.AUTO: 0.2 E.U./DL
VARIANT LYMPHS # BLD AUTO: 11 %
VENTRICULAR RATE: 76 BPM
WBC # BLD AUTO: 25.38 THOUSAND/UL (ref 4.31–10.16)
WBC # BLD AUTO: 28.2 THOUSAND/UL (ref 4.31–10.16)
WBC #/AREA URNS AUTO: ABNORMAL /HPF

## 2023-12-08 PROCEDURE — 97110 THERAPEUTIC EXERCISES: CPT

## 2023-12-08 PROCEDURE — 36415 COLL VENOUS BLD VENIPUNCTURE: CPT | Performed by: EMERGENCY MEDICINE

## 2023-12-08 PROCEDURE — 83605 ASSAY OF LACTIC ACID: CPT | Performed by: EMERGENCY MEDICINE

## 2023-12-08 PROCEDURE — C9113 INJ PANTOPRAZOLE SODIUM, VIA: HCPCS | Performed by: EMERGENCY MEDICINE

## 2023-12-08 PROCEDURE — 99222 1ST HOSP IP/OBS MODERATE 55: CPT | Performed by: STUDENT IN AN ORGANIZED HEALTH CARE EDUCATION/TRAINING PROGRAM

## 2023-12-08 PROCEDURE — 93005 ELECTROCARDIOGRAM TRACING: CPT

## 2023-12-08 PROCEDURE — 87040 BLOOD CULTURE FOR BACTERIA: CPT | Performed by: EMERGENCY MEDICINE

## 2023-12-08 PROCEDURE — 85025 COMPLETE CBC W/AUTO DIFF WBC: CPT | Performed by: STUDENT IN AN ORGANIZED HEALTH CARE EDUCATION/TRAINING PROGRAM

## 2023-12-08 PROCEDURE — 70450 CT HEAD/BRAIN W/O DYE: CPT

## 2023-12-08 PROCEDURE — 74177 CT ABD & PELVIS W/CONTRAST: CPT

## 2023-12-08 PROCEDURE — 97167 OT EVAL HIGH COMPLEX 60 MIN: CPT

## 2023-12-08 PROCEDURE — 86850 RBC ANTIBODY SCREEN: CPT | Performed by: EMERGENCY MEDICINE

## 2023-12-08 PROCEDURE — 96365 THER/PROPH/DIAG IV INF INIT: CPT

## 2023-12-08 PROCEDURE — 85007 BL SMEAR W/DIFF WBC COUNT: CPT | Performed by: EMERGENCY MEDICINE

## 2023-12-08 PROCEDURE — 86140 C-REACTIVE PROTEIN: CPT | Performed by: STUDENT IN AN ORGANIZED HEALTH CARE EDUCATION/TRAINING PROGRAM

## 2023-12-08 PROCEDURE — 93010 ELECTROCARDIOGRAM REPORT: CPT | Performed by: INTERNAL MEDICINE

## 2023-12-08 PROCEDURE — 94660 CPAP INITIATION&MGMT: CPT

## 2023-12-08 PROCEDURE — 94760 N-INVAS EAR/PLS OXIMETRY 1: CPT

## 2023-12-08 PROCEDURE — 73030 X-RAY EXAM OF SHOULDER: CPT

## 2023-12-08 PROCEDURE — 86900 BLOOD TYPING SEROLOGIC ABO: CPT | Performed by: EMERGENCY MEDICINE

## 2023-12-08 PROCEDURE — 80053 COMPREHEN METABOLIC PANEL: CPT | Performed by: EMERGENCY MEDICINE

## 2023-12-08 PROCEDURE — 84484 ASSAY OF TROPONIN QUANT: CPT | Performed by: EMERGENCY MEDICINE

## 2023-12-08 PROCEDURE — 97163 PT EVAL HIGH COMPLEX 45 MIN: CPT

## 2023-12-08 PROCEDURE — 84145 PROCALCITONIN (PCT): CPT | Performed by: EMERGENCY MEDICINE

## 2023-12-08 PROCEDURE — 94640 AIRWAY INHALATION TREATMENT: CPT

## 2023-12-08 PROCEDURE — 80053 COMPREHEN METABOLIC PANEL: CPT | Performed by: STUDENT IN AN ORGANIZED HEALTH CARE EDUCATION/TRAINING PROGRAM

## 2023-12-08 PROCEDURE — G1004 CDSM NDSC: HCPCS

## 2023-12-08 PROCEDURE — 87154 CUL TYP ID BLD PTHGN 6+ TRGT: CPT | Performed by: EMERGENCY MEDICINE

## 2023-12-08 PROCEDURE — XW033E5 INTRODUCTION OF REMDESIVIR ANTI-INFECTIVE INTO PERIPHERAL VEIN, PERCUTANEOUS APPROACH, NEW TECHNOLOGY GROUP 5: ICD-10-PCS | Performed by: STUDENT IN AN ORGANIZED HEALTH CARE EDUCATION/TRAINING PROGRAM

## 2023-12-08 PROCEDURE — 99291 CRITICAL CARE FIRST HOUR: CPT | Performed by: EMERGENCY MEDICINE

## 2023-12-08 PROCEDURE — 86901 BLOOD TYPING SEROLOGIC RH(D): CPT | Performed by: EMERGENCY MEDICINE

## 2023-12-08 PROCEDURE — 99285 EMERGENCY DEPT VISIT HI MDM: CPT

## 2023-12-08 PROCEDURE — 72125 CT NECK SPINE W/O DYE: CPT

## 2023-12-08 PROCEDURE — 87086 URINE CULTURE/COLONY COUNT: CPT | Performed by: EMERGENCY MEDICINE

## 2023-12-08 PROCEDURE — 82010 KETONE BODYS QUAN: CPT | Performed by: EMERGENCY MEDICINE

## 2023-12-08 PROCEDURE — 87635 SARS-COV-2 COVID-19 AMP PRB: CPT | Performed by: EMERGENCY MEDICINE

## 2023-12-08 PROCEDURE — 82948 REAGENT STRIP/BLOOD GLUCOSE: CPT

## 2023-12-08 PROCEDURE — 85027 COMPLETE CBC AUTOMATED: CPT | Performed by: EMERGENCY MEDICINE

## 2023-12-08 PROCEDURE — 71260 CT THORAX DX C+: CPT

## 2023-12-08 PROCEDURE — 83880 ASSAY OF NATRIURETIC PEPTIDE: CPT | Performed by: STUDENT IN AN ORGANIZED HEALTH CARE EDUCATION/TRAINING PROGRAM

## 2023-12-08 PROCEDURE — 81001 URINALYSIS AUTO W/SCOPE: CPT | Performed by: EMERGENCY MEDICINE

## 2023-12-08 PROCEDURE — 96361 HYDRATE IV INFUSION ADD-ON: CPT

## 2023-12-08 PROCEDURE — 84484 ASSAY OF TROPONIN QUANT: CPT | Performed by: STUDENT IN AN ORGANIZED HEALTH CARE EDUCATION/TRAINING PROGRAM

## 2023-12-08 PROCEDURE — 96375 TX/PRO/DX INJ NEW DRUG ADDON: CPT

## 2023-12-08 RX ORDER — INSULIN LISPRO 100 [IU]/ML
4-20 INJECTION, SOLUTION INTRAVENOUS; SUBCUTANEOUS
Status: DISCONTINUED | OUTPATIENT
Start: 2023-12-08 | End: 2023-12-11 | Stop reason: HOSPADM

## 2023-12-08 RX ORDER — ENOXAPARIN SODIUM 100 MG/ML
40 INJECTION SUBCUTANEOUS DAILY
Status: DISCONTINUED | OUTPATIENT
Start: 2023-12-08 | End: 2023-12-08

## 2023-12-08 RX ORDER — GUAIFENESIN 600 MG/1
1200 TABLET, EXTENDED RELEASE ORAL EVERY 12 HOURS SCHEDULED
Status: DISCONTINUED | OUTPATIENT
Start: 2023-12-08 | End: 2023-12-11 | Stop reason: HOSPADM

## 2023-12-08 RX ORDER — ACETAMINOPHEN 325 MG/1
650 TABLET ORAL EVERY 6 HOURS PRN
Status: DISCONTINUED | OUTPATIENT
Start: 2023-12-08 | End: 2023-12-09

## 2023-12-08 RX ORDER — BUSPIRONE HYDROCHLORIDE 10 MG/1
10 TABLET ORAL 2 TIMES DAILY
Status: DISCONTINUED | OUTPATIENT
Start: 2023-12-08 | End: 2023-12-11 | Stop reason: HOSPADM

## 2023-12-08 RX ORDER — CHLORAL HYDRATE 500 MG
1000 CAPSULE ORAL 2 TIMES DAILY
Status: DISCONTINUED | OUTPATIENT
Start: 2023-12-08 | End: 2023-12-11 | Stop reason: HOSPADM

## 2023-12-08 RX ORDER — INSULIN GLARGINE 100 [IU]/ML
50 INJECTION, SOLUTION SUBCUTANEOUS EVERY 12 HOURS SCHEDULED
Status: DISCONTINUED | OUTPATIENT
Start: 2023-12-08 | End: 2023-12-11 | Stop reason: HOSPADM

## 2023-12-08 RX ORDER — BUMETANIDE 1 MG/1
1 TABLET ORAL 2 TIMES DAILY
Status: DISCONTINUED | OUTPATIENT
Start: 2023-12-08 | End: 2023-12-08

## 2023-12-08 RX ORDER — METOPROLOL SUCCINATE 50 MG/1
200 TABLET, EXTENDED RELEASE ORAL DAILY
Status: DISCONTINUED | OUTPATIENT
Start: 2023-12-08 | End: 2023-12-08

## 2023-12-08 RX ORDER — TIZANIDINE 2 MG/1
4 TABLET ORAL EVERY 8 HOURS PRN
Status: DISCONTINUED | OUTPATIENT
Start: 2023-12-08 | End: 2023-12-11 | Stop reason: HOSPADM

## 2023-12-08 RX ORDER — LAMOTRIGINE 25 MG/1
25 TABLET ORAL
Status: DISCONTINUED | OUTPATIENT
Start: 2023-12-08 | End: 2023-12-11 | Stop reason: HOSPADM

## 2023-12-08 RX ORDER — FLUTICASONE PROPIONATE 50 MCG
1 SPRAY, SUSPENSION (ML) NASAL DAILY
Status: DISCONTINUED | OUTPATIENT
Start: 2023-12-08 | End: 2023-12-11 | Stop reason: HOSPADM

## 2023-12-08 RX ORDER — OMEGA-3-ACID ETHYL ESTERS 1 G/1
2 CAPSULE, LIQUID FILLED ORAL DAILY
Status: DISCONTINUED | OUTPATIENT
Start: 2023-12-08 | End: 2023-12-08 | Stop reason: CLARIF

## 2023-12-08 RX ORDER — DIGOXIN 125 MCG
250 TABLET ORAL DAILY
Status: DISCONTINUED | OUTPATIENT
Start: 2023-12-08 | End: 2023-12-11 | Stop reason: HOSPADM

## 2023-12-08 RX ORDER — POTASSIUM CHLORIDE 20 MEQ/1
20 TABLET, EXTENDED RELEASE ORAL
Status: DISCONTINUED | OUTPATIENT
Start: 2023-12-08 | End: 2023-12-11 | Stop reason: HOSPADM

## 2023-12-08 RX ORDER — ASCORBIC ACID 500 MG
1000 TABLET ORAL DAILY
Status: DISCONTINUED | OUTPATIENT
Start: 2023-12-08 | End: 2023-12-11 | Stop reason: HOSPADM

## 2023-12-08 RX ORDER — CEFTRIAXONE 1 G/50ML
1000 INJECTION, SOLUTION INTRAVENOUS ONCE
Status: COMPLETED | OUTPATIENT
Start: 2023-12-08 | End: 2023-12-08

## 2023-12-08 RX ORDER — ASPIRIN 81 MG/1
81 TABLET, CHEWABLE ORAL ONCE
Status: COMPLETED | OUTPATIENT
Start: 2023-12-08 | End: 2023-12-08

## 2023-12-08 RX ORDER — PANTOPRAZOLE SODIUM 40 MG/10ML
40 INJECTION, POWDER, LYOPHILIZED, FOR SOLUTION INTRAVENOUS ONCE
Status: COMPLETED | OUTPATIENT
Start: 2023-12-08 | End: 2023-12-08

## 2023-12-08 RX ORDER — DOCUSATE SODIUM 100 MG/1
100 CAPSULE, LIQUID FILLED ORAL 2 TIMES DAILY
Status: DISCONTINUED | OUTPATIENT
Start: 2023-12-08 | End: 2023-12-11 | Stop reason: HOSPADM

## 2023-12-08 RX ORDER — LANOLIN ALCOHOL/MO/W.PET/CERES
400 CREAM (GRAM) TOPICAL DAILY
Status: DISCONTINUED | OUTPATIENT
Start: 2023-12-08 | End: 2023-12-11 | Stop reason: HOSPADM

## 2023-12-08 RX ORDER — FLUTICASONE FUROATE AND VILANTEROL 100; 25 UG/1; UG/1
1 POWDER RESPIRATORY (INHALATION) DAILY
Status: DISCONTINUED | OUTPATIENT
Start: 2023-12-08 | End: 2023-12-11 | Stop reason: HOSPADM

## 2023-12-08 RX ORDER — GABAPENTIN 400 MG/1
800 CAPSULE ORAL 2 TIMES DAILY
Status: DISCONTINUED | OUTPATIENT
Start: 2023-12-08 | End: 2023-12-11 | Stop reason: HOSPADM

## 2023-12-08 RX ORDER — IPRATROPIUM BROMIDE AND ALBUTEROL SULFATE 2.5; .5 MG/3ML; MG/3ML
3 SOLUTION RESPIRATORY (INHALATION)
Status: DISCONTINUED | OUTPATIENT
Start: 2023-12-08 | End: 2023-12-11 | Stop reason: HOSPADM

## 2023-12-08 RX ORDER — PANTOPRAZOLE SODIUM 40 MG/1
40 TABLET, DELAYED RELEASE ORAL DAILY
Status: DISCONTINUED | OUTPATIENT
Start: 2023-12-08 | End: 2023-12-11 | Stop reason: HOSPADM

## 2023-12-08 RX ORDER — BENZONATATE 100 MG/1
100 CAPSULE ORAL 3 TIMES DAILY PRN
Status: DISCONTINUED | OUTPATIENT
Start: 2023-12-08 | End: 2023-12-11 | Stop reason: HOSPADM

## 2023-12-08 RX ORDER — ALBUTEROL SULFATE 2.5 MG/3ML
2.5 SOLUTION RESPIRATORY (INHALATION) EVERY 6 HOURS PRN
Status: DISCONTINUED | OUTPATIENT
Start: 2023-12-08 | End: 2023-12-11 | Stop reason: HOSPADM

## 2023-12-08 RX ORDER — DEXAMETHASONE SODIUM PHOSPHATE 10 MG/ML
6 INJECTION, SOLUTION INTRAMUSCULAR; INTRAVENOUS EVERY 24 HOURS
Status: DISCONTINUED | OUTPATIENT
Start: 2023-12-08 | End: 2023-12-08

## 2023-12-08 RX ORDER — ATORVASTATIN CALCIUM 10 MG/1
10 TABLET, FILM COATED ORAL DAILY
Status: DISCONTINUED | OUTPATIENT
Start: 2023-12-08 | End: 2023-12-11 | Stop reason: HOSPADM

## 2023-12-08 RX ORDER — QUETIAPINE FUMARATE 100 MG/1
300 TABLET, FILM COATED ORAL
Status: DISCONTINUED | OUTPATIENT
Start: 2023-12-08 | End: 2023-12-11 | Stop reason: HOSPADM

## 2023-12-08 RX ORDER — MELATONIN
1000 DAILY
Status: DISCONTINUED | OUTPATIENT
Start: 2023-12-08 | End: 2023-12-11 | Stop reason: HOSPADM

## 2023-12-08 RX ORDER — TRAMADOL HYDROCHLORIDE 50 MG/1
50 TABLET ORAL EVERY 6 HOURS PRN
Status: DISCONTINUED | OUTPATIENT
Start: 2023-12-08 | End: 2023-12-11 | Stop reason: HOSPADM

## 2023-12-08 RX ORDER — SODIUM CHLORIDE 1 G/1
1 TABLET ORAL EVERY MORNING
Status: DISCONTINUED | OUTPATIENT
Start: 2023-12-08 | End: 2023-12-11 | Stop reason: HOSPADM

## 2023-12-08 RX ADMIN — SODIUM CHLORIDE 1000 ML: 0.9 INJECTION, SOLUTION INTRAVENOUS at 00:43

## 2023-12-08 RX ADMIN — FLUTICASONE FUROATE AND VILANTEROL TRIFENATATE 1 PUFF: 100; 25 POWDER RESPIRATORY (INHALATION) at 09:37

## 2023-12-08 RX ADMIN — GABAPENTIN 800 MG: 400 CAPSULE ORAL at 17:36

## 2023-12-08 RX ADMIN — GUAIFENESIN 1200 MG: 600 TABLET, EXTENDED RELEASE ORAL at 09:37

## 2023-12-08 RX ADMIN — OMEGA-3 FATTY ACIDS CAP 1000 MG 1000 MG: 1000 CAP at 09:37

## 2023-12-08 RX ADMIN — APIXABAN 5 MG: 5 TABLET, FILM COATED ORAL at 17:36

## 2023-12-08 RX ADMIN — Medication 400 MG: at 09:37

## 2023-12-08 RX ADMIN — TRAMADOL HYDROCHLORIDE 50 MG: 50 TABLET, COATED ORAL at 17:36

## 2023-12-08 RX ADMIN — BUSPIRONE HYDROCHLORIDE 10 MG: 10 TABLET ORAL at 09:37

## 2023-12-08 RX ADMIN — BUSPIRONE HYDROCHLORIDE 10 MG: 10 TABLET ORAL at 17:36

## 2023-12-08 RX ADMIN — INSULIN LISPRO 16 UNITS: 100 INJECTION, SOLUTION INTRAVENOUS; SUBCUTANEOUS at 21:02

## 2023-12-08 RX ADMIN — IPRATROPIUM BROMIDE AND ALBUTEROL SULFATE 3 ML: 2.5; .5 SOLUTION RESPIRATORY (INHALATION) at 19:29

## 2023-12-08 RX ADMIN — INSULIN GLARGINE 50 UNITS: 100 INJECTION, SOLUTION SUBCUTANEOUS at 09:37

## 2023-12-08 RX ADMIN — DEXAMETHASONE SODIUM PHOSPHATE 6 MG: 10 INJECTION, SOLUTION INTRAMUSCULAR; INTRAVENOUS at 09:36

## 2023-12-08 RX ADMIN — Medication 1000 UNITS: at 09:37

## 2023-12-08 RX ADMIN — CEFTRIAXONE 1000 MG: 1 INJECTION, SOLUTION INTRAVENOUS at 01:46

## 2023-12-08 RX ADMIN — LAMOTRIGINE 25 MG: 25 TABLET ORAL at 21:03

## 2023-12-08 RX ADMIN — OMEGA-3 FATTY ACIDS CAP 1000 MG 1000 MG: 1000 CAP at 17:36

## 2023-12-08 RX ADMIN — INSULIN LISPRO 12 UNITS: 100 INJECTION, SOLUTION INTRAVENOUS; SUBCUTANEOUS at 12:13

## 2023-12-08 RX ADMIN — FLUTICASONE PROPIONATE 1 SPRAY: 50 SPRAY, METERED NASAL at 09:39

## 2023-12-08 RX ADMIN — INSULIN LISPRO 16 UNITS: 100 INJECTION, SOLUTION INTRAVENOUS; SUBCUTANEOUS at 16:38

## 2023-12-08 RX ADMIN — UMECLIDINIUM 1 PUFF: 62.5 AEROSOL, POWDER ORAL at 09:36

## 2023-12-08 RX ADMIN — REMDESIVIR 200 MG: 100 INJECTION, POWDER, LYOPHILIZED, FOR SOLUTION INTRAVENOUS at 12:15

## 2023-12-08 RX ADMIN — ATORVASTATIN CALCIUM 10 MG: 10 TABLET, FILM COATED ORAL at 09:37

## 2023-12-08 RX ADMIN — APIXABAN 5 MG: 5 TABLET, FILM COATED ORAL at 09:37

## 2023-12-08 RX ADMIN — GUAIFENESIN 1200 MG: 600 TABLET, EXTENDED RELEASE ORAL at 20:50

## 2023-12-08 RX ADMIN — TRAMADOL HYDROCHLORIDE 50 MG: 50 TABLET, COATED ORAL at 09:38

## 2023-12-08 RX ADMIN — INSULIN GLARGINE 50 UNITS: 100 INJECTION, SOLUTION SUBCUTANEOUS at 20:50

## 2023-12-08 RX ADMIN — DOCUSATE SODIUM 100 MG: 100 CAPSULE, LIQUID FILLED ORAL at 17:38

## 2023-12-08 RX ADMIN — IOHEXOL 100 ML: 350 INJECTION, SOLUTION INTRAVENOUS at 02:49

## 2023-12-08 RX ADMIN — SERTRALINE HYDROCHLORIDE 50 MG: 50 TABLET ORAL at 09:37

## 2023-12-08 RX ADMIN — DIGOXIN 250 MCG: 125 TABLET ORAL at 09:37

## 2023-12-08 RX ADMIN — IPRATROPIUM BROMIDE AND ALBUTEROL SULFATE 3 ML: 2.5; .5 SOLUTION RESPIRATORY (INHALATION) at 07:49

## 2023-12-08 RX ADMIN — QUETIAPINE FUMARATE 300 MG: 100 TABLET ORAL at 21:30

## 2023-12-08 RX ADMIN — SODIUM CHLORIDE 1000 ML: 0.9 INJECTION, SOLUTION INTRAVENOUS at 01:30

## 2023-12-08 RX ADMIN — SODIUM CHLORIDE 1 G: 1 TABLET ORAL at 09:37

## 2023-12-08 RX ADMIN — GABAPENTIN 800 MG: 400 CAPSULE ORAL at 09:37

## 2023-12-08 RX ADMIN — TIZANIDINE 4 MG: 2 TABLET ORAL at 13:31

## 2023-12-08 RX ADMIN — POTASSIUM CHLORIDE 20 MEQ: 1500 TABLET, EXTENDED RELEASE ORAL at 06:22

## 2023-12-08 RX ADMIN — Medication 1 TABLET: at 09:37

## 2023-12-08 RX ADMIN — INSULIN LISPRO 16 UNITS: 100 INJECTION, SOLUTION INTRAVENOUS; SUBCUTANEOUS at 09:35

## 2023-12-08 RX ADMIN — PANTOPRAZOLE SODIUM 40 MG: 40 TABLET, DELAYED RELEASE ORAL at 09:37

## 2023-12-08 RX ADMIN — OXYCODONE HYDROCHLORIDE AND ACETAMINOPHEN 1000 MG: 500 TABLET ORAL at 09:37

## 2023-12-08 RX ADMIN — PANTOPRAZOLE SODIUM 40 MG: 40 INJECTION, POWDER, FOR SOLUTION INTRAVENOUS at 01:27

## 2023-12-08 RX ADMIN — IPRATROPIUM BROMIDE AND ALBUTEROL SULFATE 3 ML: 2.5; .5 SOLUTION RESPIRATORY (INHALATION) at 14:26

## 2023-12-08 RX ADMIN — TIZANIDINE 4 MG: 2 TABLET ORAL at 21:30

## 2023-12-08 RX ADMIN — ASPIRIN 81 MG CHEWABLE TABLET 81 MG: 81 TABLET CHEWABLE at 06:22

## 2023-12-08 RX ADMIN — DOCUSATE SODIUM 100 MG: 100 CAPSULE, LIQUID FILLED ORAL at 09:37

## 2023-12-08 NOTE — OCCUPATIONAL THERAPY NOTE
OT EVALUATION       12/08/23 1025   OT Last Visit   OT Visit Date 12/08/23   Note Type   Note type Evaluation   Pain Assessment   Pain Assessment Tool 0-10   Pain Score 7   Pain Location/Orientation Orientation: Left; Location: Shoulder   Pain 2   Pain Score 2 4   Pain Location/Orientation 2 Location: Back   Restrictions/Precautions   Other Precautions Fall Risk;O2;Pain; Airborne/isolation;Contact/isolation   Home Living   Type of Home Apartment   Home Layout One level;Elevator   Bathroom Shower/Tub Walk-in shower   Bathroom Toilet Raised   Bathroom Equipment Shower chair   Home Equipment Walker;Reacher;Sock aid  (O2)   Prior Function   Level of [place] Independent with ADLs; Independent with functional mobility; Independent with IADLS   Lives With [address] [place] [address] [facility]  (home OT/PT)   Falls in the last 6 months 1 to 4   Comments Patient reports fall in bathroom prior to admission.  Patient now complaining of L shoulder pain   Subjective   Subjective "My left arm hurts to move it"   ADL   Eating Assistance [facility]  4  Minimal Assistance   Bed Mobility   Supine to Sit 5  Supervision   Sit to Supine 5  Supervision   Transfers   Sit to Stand 5  Supervision   Stand to Sit 5  Supervision   Functional Mobility   Functional Mobility 5  Supervision   Additional Comments short household distance in room with RW   Balance   Static Sitting Fair +   Dynamic Sitting Fair   Static Standing Fair   Dynamic Standing Fair   Activity Tolerance   Activity Tolerance Patient limited by fatigue;Patient limited by pain   Nurse Made Aware nurse Oral Staggers and PT Bhavana Ham Assessment   RUE Assessment WFL   LUE Assessment   LUE Assessment X   LUE Overall AROM   L Shoulder Flexion 20 degrees, limited by pain in shoulder per pt   L Elbow Flexion when tested pt unable to fully flex elbow, but during session completed full ROM to put glasses on   L [place] Grasp WFL pt reports it strains in his shoulder   Cognition   Overall Cognitive Status WFL   Arousal/Participation Cooperative   Attention Within functional limits   Orientation Level Oriented X4   Following Commands Follows all commands and directions without difficulty   Assessment   Limitation Decreased ADL status; Decreased UE ROM; Decreased Safe judgement during ADL;Decreased UE strength;Decreased endurance;Decreased high-level ADLs; Decreased self-care trans  (decreased balance and mobility)   Prognosis Good   Assessment Patient evaluated by Occupational Therapy. Patient admitted with Septic shock ([address]). The patients occupational profile, medical and therapy history includes a extensive additional review of physical, cognitive, or psychosocial history related to current functional performance. Comorbidities affecting functional mobility and ADLS include: Afib, anxiety, Bipolar disorder, CAD, chronic back pain, CHF, COPD, depression, diabetes, and fibromyalgia. Prior to admission, patient was independent with functional mobility with walker, independent with ADLS, and independent with IADLS. The evaluation identifies the following performance deficits: weakness, decreased ROM, impaired balance, decreased endurance, increased fall risk, new onset of impairment of functional mobility, decreased ADLS, decreased IADLS, pain, decreased activity tolerance, decreased safety awareness, impaired judgement, and decreased strength, that result in activity limitations and/or participation restrictions.  This evaluation requires clinical decision making of high complexity, because the patient presents with comorbidites that affect occupational performance and required significant modification of tasks or assistance with consideration of multiple treatment options. The Barthel Index was used as a functional outcome tool presenting with a score of Barthel Index Score: 50, indicating marked limitations of functional mobility and ADLS. The patient's raw score on the -PAC Daily Activity Inpatient Short Form is 20. A raw score of greater than or equal to 19 suggests the patient may benefit from discharge to home. Please refer to the recommendation of the Occupational Therapist for safe discharge planning. Patient will benefit from skilled Occupational Therapy services to address above deficits and facilitate a safe return to prior level of function. Goals   Patient Goals to feel better and less pain in L shoulder   STG Time Frame   (1-7 days)   Short Term Goal  Goals established to promote Patient Goals: to feel better and less pain in L shoulder:  Grooming: supervision standing at sink; Bathing: supervision; Upper Body Dressing supervision; Lower Body Dressing: supervision; Toileting: supervision; Patient will increase ambulatory standard toilet transfer to independent with rolling walker to increase performance and safety with ADLS and functional mobility; Patient will increase standing tolerance to 5 minutes during ADL task to decrease assistance level and decrease fall risk; Patient will increase bed mobility to independent in preparation for ADLS and transfers;  Patient will increase functional mobility to and from bathroom with rolling walker independently to increase performance with ADLS and to use a toilet; Patient will tolerate 5 minutes of UE ROM/strengthening to increase general activity tolerance and performance in ADLS/IADLS; Patient will improve functional activity tolerance to 10 minutes of sustained functional tasks to increase participation in basic self-care and decrease assistance level;  Patient will be able to to verbalize understanding and perform energy conservation/proper body mechanics during ADLS and functional mobility at least 75% of the time with minimal cueing to decrease signs of fatigue and increase stamina to return to prior level of function;  Patient will increase dynamic standing balance to fair+ to improve postural stability and decrease fall risk during standing ADLS and transfers. LTG Time Frame   (8-14 days)   Long Term Goal Grooming: independent standing at sink; Bathing: independent; Upper Body Dressing independent; Lower Body Dressing: independent; Toileting: independent;  Patient will increase standing tolerance to 10 minutes during ADL task to decrease assistance level and decrease fall risk;  Patient will tolerate 10 minutes of UE ROM/strengthening to increase general activity tolerance and performance in ADLS/IADLS; Patient will improve functional activity tolerance to 20 minutes of sustained functional tasks to increase participation in basic self-care and decrease assistance level;  Patient will be able to to verbalize understanding and perform energy conservation/proper body mechanics during ADLS and functional mobility at least 90% of the time to decrease signs of fatigue and increase stamina to return to prior level of function;  Patient will increase dynamic standing balance to good to improve postural stability and decrease fall risk during standing ADLS and transfers. Pt will score >/= 24/24 on AM-PAC Daily Activity Inpatient scale to promote safe independence with ADLs and functional mobility; Pt will score >/= 80/100 on Barthel Index in order to decrease caregiver assistance needed and increase ability to perform ADLs and functional mobility. Plan   Treatment Interventions ADL retraining;Functional transfer training;UE strengthening/ROM; Endurance training;Equipment evaluation/education; Activityengagement; Compensatory technique education   Goal Expiration Date 12/22/23   OT Frequency 3-5x/wk   Discharge Recommendation   Rehab Resource Intensity Level, OT III (Minimum Resource Intensity)   AM-PAC Daily Activity Inpatient   Lower Body Dressing 3   Bathing 3   Toileting 3   Upper Body Dressing 3   Grooming 4   Eating 4   Daily Activity Raw Score 20   Daily Activity Standardized Score (Calc for Raw Score >=11) 42.03   AM-PAC Applied Cognition Inpatient   Following a Speech/Presentation 4   Understanding Ordinary Conversation 4   Taking Medications 4   Remembering Where Things Are Placed or Put Away 4   Remembering List of 4-5 Errands 4   Taking Care of Complicated Tasks 4   Applied Cognition Raw Score 24   Applied Cognition Standardized Score 62.21   Barthel Index   Feeding 10   Bathing 0   Grooming Score 0   Dressing Score 5   Bladder Score 10   Bowels Score 10   Toilet Use Score 5   Transfers (Bed/Chair) Score 10   Mobility (Level Surface) Score 0   Stairs Score 0   Barthel Index Score 50   Licensure   [place] License Number  [facility] [id number] [facility] OTR/L [id number]

## 2023-12-08 NOTE — ASSESSMENT & PLAN NOTE
Presented with hypotension; metoprolol, bumex and losartan held on admission.  Will restart as appropriate  BP Improved  Resume home medications on discharge
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
## 2023-12-08 NOTE — ASSESSMENT & PLAN NOTE
Prior to arrival as evidenced by hypotension 60/41, leukocytosis 28.20, tachypnea RR 21, in setting of COVID-19. LA 2.4    ED: IV NS 2L bolus    CT C/A/P: Mixed groundglass and consolidative change in the right lower lobe. Mucus plugging of some distal branches in the right lower lobe. Mediastinal and right hilar lymphadenopathy. Large amount of stool in the rectum and sigmoid colon. Mild diffuse wall thickening of the urinary bladder. Prostatomegaly.     More likely COVID pneumonia r/o lobar/aspiration pneumonia, less likely Acute Colitis vs Acute cystitis   - COVID positive  - Bcx 1/2 neg, Bcx 2/2 gram+ likely contaminant  - UA & Ucx negative  - Pt reports normal bowel movements and no abdominal pain    Received empiric coverage with IV Rocephin 1g QD & IV Doxycycline Q12hrs  Avoiding IVF due to risk of CHF exacerbation  Rest of A/P as per 'Pneumonia due to COVID' - Mild Pathway

## 2023-12-08 NOTE — ED PROVIDER NOTES
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 treatment strategy  to be determined. If 5-49 ng/L, a delta result at 2 hours and or 4 hours will be needed to further evaluate. If <4 ng/L, and chest pain has been >3 hours since onset, patient may qualify for discharge based on the HEART score in the ED. If <5 ng/L and <3hours since onset of chest pain, a delta result at 2 hours will be needed to further evaluate. HS Troponin 99th Percentile URL of a Health Population=12 ng/L with a 95% Confidence Interval of 8-18 ng/L. Second Troponin (time 2 hours)  If calculated delta >= 20 ng/L,  Myocardial injury suggested ; Type of myocardial injury and treatment strategy to be determined. If 5-49 ng/L and the calculated delta is 5-19 ng/L, consult medical service for evaluation. Continue evaluation for ischemia on ecg and other possible etiology and repeat hs troponin at 4 hours. If delta is <5 ng/L at 2 hours, consider discharge based on risk stratification via the HEART score (if in ED), or CRISELDA risk score in IP/Observation. HS Troponin 99th Percentile URL of a Health Population=12 ng/L with a 95% Confidence Interval of 8-18 ng/L.     Delta 2hr hsTnI -3  <20 ng/L Final   LACTIC ACID 2 HOUR - Normal LACTIC ACID 2.0  0.5 - 2.0 mmol/L Final    Narrative:     Result may be elevated if tourniquet was used during collection. URINE CULTURE   RBC MORPHOLOGY REFLEX TEST   TYPE AND SCREEN    ABO Grouping B   Final    Rh Factor Positive   Final    Antibody Screen Negative   Final    Specimen Expiration Date [id number]   Final   [place] RECHECK       *Each of these labs was reviewed. Particular standout labs will be noted in the ED Course above     Final Diagnosis:  1. COVID    2. Shock circulatory ([address])    3.  Pneumonia          P:  - hospital tx includes   Medications   acetaminophen (TYLENOL) tablet 650 mg (has no administration in time range)   albuterol inhalation solution 2.5 mg (has no administration in time range)   ascorbic acid (VITAMIN C) tablet 1,000 mg (1,000 mg Oral Given 12/8/23 0937)   atorvastatin (LIPITOR) tablet 10 mg (10 mg Oral Given 12/8/23 0937)   benzonatate (TESSALON PERLES) capsule 100 mg (has no administration in time range)   busPIRone (BUSPAR) tablet 10 mg (10 mg Oral Given 12/8/23 1736)   cholecalciferol (VITAMIN D3) tablet 1,000 Units (1,000 Units Oral Given 12/8/23 0937)   digoxin (LANOXIN) tablet 250 mcg (250 mcg Oral Given 12/8/23 0937)   docusate sodium (COLACE) capsule 100 mg (100 mg Oral Given 12/8/23 1738)   fluticasone (FLONASE) 50 mcg/act nasal spray 1 spray (1 spray Nasal Given 12/8/23 0939)   guaiFENesin (MUCINEX) 12 hr tablet 1,200 mg (1,200 mg Oral Given 12/8/23 2050)   insulin glargine (LANTUS) subcutaneous injection 50 Units 0.5 mL (50 Units Subcutaneous Given 12/8/23 2050)   ipratropium-albuterol (DUO-NEB) 0.5-2.5 mg/3 mL inhalation solution 3 mL (3 mL Nebulization Given 12/9/23 0138)   lamoTRIgine (LaMICtal) tablet 25 mg (25 mg Oral Given 12/8/23 2103)   magnesium Oxide (MAG-OX) tablet 400 mg (400 mg Oral Given 12/8/23 0937)   multivitamin-minerals (CENTRUM) tablet 1 tablet (1 tablet Oral Given 12/8/23 0937)   pantoprazole (PROTONIX) EC tablet 40 mg (40 mg Oral Given 12/8/23 4678)   potassium chloride (K-DUR,KLOR-CON) CR tablet 20 mEq (20 mEq Oral Given 12/8/23 0622)   sertraline (ZOLOFT) tablet 50 mg (50 mg Oral Given 12/8/23 0937)   sodium chloride tablet 1 g (1 g Oral Given 12/8/23 0937)   tiZANidine (ZANAFLEX) tablet 4 mg (4 mg Oral Given 12/8/23 2130)   traMADol (ULTRAM) tablet 50 mg (50 mg Oral Given 12/8/23 1736)   Fluticasone Furoate-Vilanterol 100-25 mcg/actuation 1 puff (1 puff Inhalation Given 12/8/23 0937)   umeclidinium 62.5 mcg/actuation inhaler AEPB 1 puff (1 puff Inhalation Given 12/8/23 0936)   gabapentin (NEURONTIN) capsule 800 mg (800 mg Oral Given 12/8/23 1736)   remdesivir (Veklury) 200 mg in sodium chloride 0.9 % 290 mL IVPB (200 mg Intravenous New Bag 12/8/23 1215)     Followed by   remdesivir Lyndol Perry) 100 mg in sodium chloride 0.9 % 270 mL IVPB (has no administration in time range)   insulin lispro (HumaLOG) 100 units/mL subcutaneous injection 4-20 Units (16 Units Subcutaneous Given 12/8/23 2102)   apixaban (ELIQUIS) tablet 5 mg (5 mg Oral Given 12/8/23 1736)   fish oil capsule 1,000 mg (1,000 mg Oral Given 12/8/23 1736)   QUEtiapine (SEROquel) tablet 300 mg (300 mg Oral Given 12/8/23 2130)   sodium chloride 0.9 % bolus 1,000 mL (0 mL Intravenous Stopped 12/8/23 0114)   pantoprazole (PROTONIX) injection 40 mg (40 mg Intravenous Given 12/8/23 0127)   sodium chloride 0.9 % bolus 1,000 mL (0 mL Intravenous Stopped 12/8/23 0230)   cefTRIAXone (ROCEPHIN) IVPB (premix in dextrose) 1,000 mg 50 mL (0 mg Intravenous Stopped 12/8/23 0216)   iohexol (OMNIPAQUE) 350 MG/ML injection (MULTI-DOSE) 100 mL (100 mL Intravenous Given 12/8/23 0249)   aspirin chewable tablet 81 mg (81 mg Oral Given 12/8/23 0622)         - disposition  Time reflects when diagnosis was documented in both MDM as applicable and the Disposition within this note       Time User Action Codes Description Comment    12/8/2023  5:00 AM Tamiko Saldaña Add [U07.1] COVID     12/8/2023  5:00 AM Tamiko Saldaña Add [R57.9] Shock circulatory ([address])     12/8/2023  5:00 AM Christine Reyes Add [J18.9] Pneumonia           ED Disposition       ED Disposition   Admit    Condition   Stable    Date/Time   Fri Dec 8, 2023  5:00 AM    Comment   Case was discussed with fam  and the patient's admission status was agreed to be Admission Status: inpatient status                Follow-up Information    None         - patient will call their PCP to let them know they were in the emergency department. We discuss return precautions and patient is agreeable with plan and aformentioned disposition.        - additional treatment intended, if consistent with primary provider:  - patient to follow with :      Current Discharge Medication List        CONTINUE these medications which have NOT CHANGED    Details   albuterol (2.5 mg/3 mL) 0.083 % nebulizer solution USE 1 VIAL (2.5 MG TOTAL) BY NEBULIZATION EVERY 6 HOURS AS NEEDED FOR WHEEZING  Qty: 375 mL, Refills: 5    Associated Diagnoses: Chronic obstructive pulmonary disease, unspecified COPD type (HCC)      Ascorbic Acid (VITAMIN C) 1000 MG tablet Take 1,000 mg by mouth daily      aspirin 81 MG tablet Take 81 mg by mouth daily      benzonatate (TESSALON PERLES) 100 mg capsule Take 1 capsule (100 mg total) by mouth 3 (three) times a day as needed for cough  Qty: 20 capsule, Refills: 0    Associated Diagnoses: Chronic bronchitis, unspecified chronic bronchitis type (HCC)      bumetanide (BUMEX) 1 mg tablet Take 1 tablet (1 mg total) by mouth 2 (two) times a day  Qty: 60 tablet, Refills: 3    Associated Diagnoses: Chronic congestive heart failure, unspecified heart failure type (HCC)      busPIRone (BUSPAR) 10 mg tablet TAKE ONE TABLET BY MOUTH TWICE DAILY  Qty: 60 tablet, Refills: 0    Associated Diagnoses: Psychiatric disorder      cholecalciferol (VITAMIN D3) 1,000 units tablet Take 1 tablet (1,000 Units total) by mouth daily  Qty: 90 tablet, Refills: 3    Associated Diagnoses: H/O vitamin D deficiency      digoxin (LANOXIN) 0.25 mg tablet Take 1 tablet (250 mcg total) by mouth daily  Qty: 90 tablet, Refills: 3    Associated Diagnoses: Atrial fibrillation with RVR ([facility])      docusate sodium (COLACE) 100 mg capsule Take 1 capsule (100 mg total) by mouth 2 (two) times a day  Refills: 0    Associated Diagnoses: Constipation      fluticasone (FLONASE) 50 mcg/act nasal spray 1 spray into each nostril daily Do not start before May 12, 2023. Qty: 11.1 mL, Refills: 2    Associated Diagnoses: Allergy, initial encounter      fluticasone-umeclidinium-vilanterol (TRELEGY) 100-62.5-25 MCG/INH inhaler Inhale 1 puff daily Rinse mouth after use. Qty: 1 each, Refills: 11    Associated Diagnoses: Centrilobular emphysema ([facility])      gabapentin (Neurontin) 800 mg tablet Take 1 tablet (800 mg total) by mouth 4 (four) times a day  Qty: 120 tablet, Refills: 0    Associated Diagnoses: Peripheral polyneuropathy      guaiFENesin (MUCINEX) 600 mg 12 hr tablet Take 2 tablets (1,200 mg total) by mouth every 12 (twelve) hours for 14 days  Qty: 56 tablet, Refills: 0    Associated Diagnoses: Acute cough      insulin lispro (HumaLOG) 100 units/mL injection Inject 1-6 Units under the skin 3 (three) times a day before meals  Refills: 0    Associated Diagnoses: Chronic pain syndrome      ipratropium-albuterol (DUO-NEB) 0.5-2.5 mg/3 mL nebulizer solution Take 3 mL by nebulization every 6 (six) hours  Refills: 0    Associated Diagnoses: Chronic bronchitis, unspecified chronic bronchitis type ([facility])      lamoTRIgine (LaMICtal) 25 mg tablet 25 mg daily at bedtime      magnesium Oxide (MAG-OX) 400 mg TABS Take 1 tablet (400 mg total) by mouth daily Do not start before October 26, 2023.   Refills: 0    Associated Diagnoses: Hypomagnesemia      metoprolol succinate (TOPROL-XL) 200 MG 24 hr tablet Take 1 tablet (200 mg total) by mouth daily  Qty: 90 tablet, Refills: 6    Associated Diagnoses: Coronary artery disease involving native heart without angina pectoris, unspecified vessel or lesion type      sertraline (ZOLOFT) 50 mg tablet Take 1 tablet (50 mg total) by mouth daily Daily at bedtime  Qty: 30 tablet, Refills: 0    Associated Diagnoses: Psychiatric disorder      sodium chloride 1 g tablet TAKE 1 TABLET DAILY IN THE MORNING  Qty: 30 tablet, Refills: 3    Associated Diagnoses: Hyponatremia      acetaminophen (TYLENOL) 325 mg tablet Take 2 tablets (650 mg total) by mouth every 6 (six) hours as needed for mild pain, headaches or fever  Qty: 30 tablet, Refills: 0    Comments: Available over the counter  Associated Diagnoses: Community acquired pneumonia      Alcohol Swabs (Alcohol Prep) 70 % PADS Apply topically 4 (four) times a day As directed  Qty: 100 each, Refills: 0    Associated Diagnoses: Type 2 diabetes mellitus with hyperglycemia, with long-term current use of insulin ([facility])      apixaban (Eliquis) 5 mg Take 1 tablet (5 mg total) by mouth 2 (two) times a day  Qty: 180 tablet, Refills: 3    Associated Diagnoses: Atrial fibrillation with RVR ([facility])      atorvastatin (LIPITOR) 10 mg tablet Take 1 tablet (10 mg total) by mouth daily  Qty: 30 tablet, Refills: 4    Associated Diagnoses: Type 2 diabetes mellitus with complication, with long-term current use of insulin ([address]); Low serum low density lipoprotein (LDL); Hyperlipidemia, unspecified hyperlipidemia type      !! B-D ULTRAFINE III SHORT PEN 31G X 8 MM MISC Use as directed      Blood Glucose Monitoring Suppl (OneTouch Verio Flex System) w/Device KIT       !! [place] Choice Comfort EZ 33G X 4 MM MISC USE TO INJECT INSULIN 5 TIMES A DAY      !! Continuous Blood Gluc Sensor (FreeStyle [place] 14 Day Sensor) MISC Use 1 application. every 14 (fourteen) days  Qty: 6 each, Refills: 0    Associated Diagnoses: Type 2 diabetes mellitus with hyperglycemia, with long-term current use of insulin ([address])      ! !  Continuous Blood Gluc Sensor (FreeStyle Sheba 14 Day Sensor) MISC Use as directed-  Qty: 6 each, Refills: 3 Associated Diagnoses: Type 2 diabetes mellitus with complication, with long-term current use of insulin ([address])      ! ! Continuous Blood Gluc Sensor (FreeStyle [place] 2 Sensor) MISC Check blood sugars multiple times per day  Qty: 6 each, Refills: 3    Associated Diagnoses: Type 2 diabetes mellitus with complication, with long-term current use of insulin ([facility])      Diclofenac Sodium (VOLTAREN) 1 % Apply 2 g topically 4 (four) times a day for 14 days  Qty: 112 g, Refills: 6    Associated Diagnoses: Chronic bilateral low back pain with left-sided sciatica      glucose blood (ReliOn True Metrix Test Strips) test strip Use as instructed  Qty: 25 each, Refills: 0    Associated Diagnoses: Uncontrolled type 2 diabetes mellitus with hyperglycemia ([facility])      Icosapent Ethyl 1 g CAPS TAKE 2 CAPSULES TWICE A DAY  Qty: 120 capsule, Refills: 6    Comments: PATIENT IS REQUESTING REFILLS. THANKS! Associated Diagnoses: Dyslipidemia      Insulin Glargine Solostar (Lantus SoloStar) 100 UNIT/ML SOPN Inject 0.5 mL (50 Units total) under the skin 2 (two) times a day  Qty: 50 mL, Refills: 6    Associated Diagnoses: Uncontrolled type 2 diabetes mellitus with hyperglycemia ([address])      ! !  Insulin Pen Needle ([place] Choice Comfort EZ) 33G X 4 MM MISC USE TO INJECT INSULIN 5 TIMES A DAY  Qty: 500 each, Refills: 1    Associated Diagnoses: Type 2 diabetes mellitus with complication, with long-term current use of insulin ([facility])      Lancet Devices (Adjustable Lancing Device) MISC USE AS DIRECTED  Qty: 1 each, Refills: 0    Associated Diagnoses: Type 2 diabetes mellitus with complication, with long-term current use of insulin ([facility])      Lancets (onetouch ultrasoft) lancets test blood sugar 3 (three) times a day  Qty: 300 each, Refills: 3    Associated Diagnoses: Type 2 diabetes mellitus with complication, with long-term current use of insulin ([facility])      losartan (COZAAR) 50 mg tablet Take 1 tablet (50 mg total) by mouth daily  Qty: 90 tablet, Refills: 3    Associated Diagnoses: Chronic systolic heart failure (HCC)      metFORMIN (GLUCOPHAGE) 1000 MG tablet Take 1 tablet (1,000 mg total) by mouth 2 (two) times a day with meals  Qty: 60 tablet, Refills: 6    Associated Diagnoses: Type 2 diabetes mellitus with complication, with long-term current use of insulin (HCC)      Multiple Vitamins-Minerals (CENTRUM SILVER 50+MEN PO) Take by mouth      omeprazole (PriLOSEC) 20 mg delayed release capsule Take 1 capsule (20 mg total) by mouth daily  Qty: 90 capsule, Refills: 3    Associated Diagnoses: Gastroesophageal reflux disease without esophagitis      pantoprazole (PROTONIX) 40 mg tablet TAKE 1 TABLET DAILY  Qty: 30 tablet, Refills: 1    Associated Diagnoses: Gastroesophageal reflux disease with esophagitis without hemorrhage      potassium chloride (K-DUR,KLOR-CON) 20 mEq tablet Take 1 tablet (20 mEq total) by mouth every other day  Qty: 15 tablet, Refills: 5    Associated Diagnoses: Hypokalemia      predniSONE 20 mg tablet 2 tabs daily x 4 days, 1 1/2 tab daily x 4 days, 1 tab daily x 4 days then 1/2 tab daily x 4 day.s  Qty: 30 tablet, Refills: 0    Comments: Extra tabs.   Associated Diagnoses: COPD exacerbation ([facility])      QUEtiapine (SEROquel) 100 mg tablet Take 1 tablet (100 mg total) by mouth daily at bedtime  Qty: 30 tablet, Refills: 6    Associated Diagnoses: Psychiatric disorder      QUEtiapine (SEROquel) 300 mg tablet Take 1 tablet (300 mg total) by mouth daily at bedtime  Qty: 30 tablet, Refills: 0    Associated Diagnoses: Psychiatric disorder      sulfamethoxazole-trimethoprim (BACTRIM DS) 800-160 mg per tablet Take 1 tablet by mouth 2 (two) times a day for 14 days  Qty: 28 tablet, Refills: 0    Associated Diagnoses: Sinusitis, unspecified chronicity, unspecified location      tiZANidine (ZANAFLEX) 4 mg tablet Take 1 tablet (4 mg total) by mouth every 8 (eight) hours as needed for muscle spasms  Qty: 75 tablet, Refills: 0    Associated Diagnoses: Myofascial pain syndrome      traMADol (ULTRAM) 50 mg tablet TAKE 1 TABLET EVERY 6 HOURS AS NEEDED FOR MODERATE PAIN  Qty: 120 tablet, Refills: 1    Associated Diagnoses: Severe pain      !! [place] SafeControl Pen Needle 30G X 5 MM MISC       Ventolin  (90 Base) MCG/ACT inhaler INHALE 2 PUFFS EVERY 6 (SIX) HOURS AS NEEDED FOR WHEEZING  Qty: 18 g, Refills: 5    Comments: Substitution to a formulary equivalent within the same pharmaceutical class is authorized. Associated Diagnoses: Simple chronic bronchitis (HCC)      Victoza injection INJECT 0.3ML UNDER THE SKIN EVERY MORNING  Qty: 9 mL, Refills: 0    Associated Diagnoses: Uncontrolled type 2 diabetes mellitus with hyperglycemia ([address])       ! ! - Potential duplicate medications found. Please discuss with provider. No discharge procedures on file. Prior to Admission Medications   Prescriptions Last Dose Informant Patient Reported? Taking? Alcohol Swabs (Alcohol Prep) 70 % PADS   No No   Sig: Apply topically 4 (four) times a day As directed   Ascorbic Acid (VITAMIN C) 1000 MG tablet 12/7/2023 Self Yes Yes   Sig: Take 1,000 mg by mouth daily   B-D ULTRAFINE III SHORT PEN 31G X 8 MM MISC  Self Yes No   Sig: Use as directed   Blood Glucose Monitoring Suppl (OneTouch Verio Flex System) w/Device KIT   Yes No   [place] Choice Comfort EZ 33G X 4 MM MISC  Self Yes No   Sig: USE TO INJECT INSULIN 5 TIMES A DAY   Continuous Blood Gluc Sensor (FreeStyle [place] 14 Day Sensor[place] [facility] [place]   [place] No   Sig: Use 1 application.  every 14 (fourteen) days   Continuous Blood Gluc Sensor (FreeStyle [place] 14 Day Sensor[place] [facility] [place]   No No   Sig: Use as directed-   Continuous Blood Gluc Sensor (FreeStyle Sheba 2 Sensor) MISC   No No   Sig: Check blood sugars multiple times per day   Diclofenac Sodium (VOLTAREN) 1 %   No No   Sig: Apply 2 g topically 4 (four) times a day for 14 days   Icosapent Ethyl 1 g CAPS   No No   Sig: TAKE 2 CAPSULES TWICE A DAY   Insulin Glargine Solostar (Lantus SoloStar) 100 UNIT/ML SOPN   No No   Sig: Inject 0.5 mL (50 Units total) under the skin 2 (two) times a day   Insulin Pen Needle ([place] Choice Comfort EZ) 33G X 4 MM MISC  Self No No   Sig: USE TO INJECT INSULIN 5 TIMES A DAY   Insulin Pen Needle 31G X 5 MM MISC   No No   Sig: Inject under the skin 4 (four) times a day   Lancet Devices (Adjustable Lancing Device) MISC  Self No No   Sig: USE AS DIRECTED   Lancets (onetouch ultrasoft) lancets  Self No No   Sig: test blood sugar 3 (three) times a day   Multiple Vitamins-Minerals (CENTRUM SILVER 50+MEN PO)  Self Yes No   Sig: Take by mouth   QUEtiapine (SEROquel) 100 mg tablet  Self No No   Sig: Take 1 tablet (100 mg total) by mouth daily at bedtime   Patient taking differently: Take 100 mg by mouth every morning   QUEtiapine (SEROquel) 300 mg tablet   No No   Sig: Take 1 tablet (300 mg total) by mouth daily at bedtime   Unifine SafeControl Pen Needle 30G X 5 MM MISC  Self Yes No   Ventolin  (90 Base) MCG/ACT inhaler   No No   Sig: INHALE 2 PUFFS EVERY 6 (SIX) HOURS AS NEEDED FOR WHEEZING   Victoza injection   No No   Sig: INJECT 0.3ML UNDER THE SKIN EVERY MORNING   acetaminophen (TYLENOL) 325 mg tablet  Self No No   Sig: Take 2 tablets (650 mg total) by mouth every 6 (six) hours as needed for mild pain, headaches or fever   albuterol (2.5 mg/3 mL) 0.083 % nebulizer solution Past Week  No Yes   Sig: USE 1 VIAL (2.5 MG TOTAL) BY NEBULIZATION EVERY 6 HOURS AS NEEDED FOR WHEEZING   apixaban (Eliquis) 5 mg  Self No No   Sig: Take 1 tablet (5 mg total) by mouth 2 (two) times a day   aspirin 81 MG tablet 12/7/2023 Self Yes Yes   Sig: Take 81 mg by mouth daily   atorvastatin (LIPITOR) 10 mg tablet   No No   Sig: Take 1 tablet (10 mg total) by mouth daily   benzonatate (TESSALON PERLES) 100 mg capsule Past Week  No Yes   Sig: Take 1 capsule (100 mg total) by mouth 3 (three) times a day as needed for cough   bumetanide (BUMEX) 1 mg tablet 12/7/2023  No Yes   Sig: Take 1 tablet (1 mg total) by mouth 2 (two) times a day   busPIRone (BUSPAR) 10 mg tablet 2023 Self No Yes   Sig: TAKE ONE TABLET BY MOUTH TWICE DAILY   cholecalciferol (VITAMIN D3) 1,000 units tablet 2023 Self No Yes   Sig: Take 1 tablet (1,000 Units total) by mouth daily   digoxin (LANOXIN) 0.25 mg tablet 2023 Self No Yes   Sig: Take 1 tablet (250 mcg total) by mouth daily   docusate sodium (COLACE) 100 mg capsule 2023  No Yes   Sig: Take 1 capsule (100 mg total) by mouth 2 (two) times a day   fluticasone (FLONASE) 50 mcg/act nasal spray 2023 Self No Yes   Si spray into each nostril daily Do not start before May 12, 2023. fluticasone-umeclidinium-vilanterol (TRELEGY) 100-62.5-25 MCG/INH inhaler 2023 Self No Yes   Sig: Inhale 1 puff daily Rinse mouth after use.   gabapentin (Neurontin) 800 mg tablet 2023  No Yes   Sig: Take 1 tablet (800 mg total) by mouth 4 (four) times a day   glucose blood (ReliOn True Metrix Test Strips) test strip   No No   Sig: Use as instructed   guaiFENesin (MUCINEX) 600 mg 12 hr tablet 2023  No Yes   Sig: Take 2 tablets (1,200 mg total) by mouth every 12 (twelve) hours for 14 days   insulin lispro (HumaLOG) 100 units/mL injection 2023  No Yes   Sig: Inject 1-6 Units under the skin 3 (three) times a day before meals   ipratropium-albuterol (DUO-NEB) 0.5-2.5 mg/3 mL nebulizer solution 2023  No Yes   Sig: Take 3 mL by nebulization every 6 (six) hours   lamoTRIgine (LaMICtal) 25 mg tablet 2023 Self Yes Yes   Si mg daily at bedtime   losartan (COZAAR) 50 mg tablet   No No   Sig: Take 1 tablet (50 mg total) by mouth daily   magnesium Oxide (MAG-OX) 400 mg TABS 2023  No Yes   Sig: Take 1 tablet (400 mg total) by mouth daily Do not start before 2023.    metFORMIN (GLUCOPHAGE) 1000 MG tablet   No No   Sig: Take 1 tablet (1,000 mg total) by mouth 2 (two) times a day with meals   metoprolol succinate (TOPROL-XL) 200 MG 24 hr tablet 2023 Self No Yes   Sig: Take 1 tablet (200 mg total) by mouth daily   omeprazole (PriLOSEC) 20 mg delayed release capsule  Self No No   Sig: Take 1 capsule (20 mg total) by mouth daily   pantoprazole (PROTONIX) 40 mg tablet   No No   Sig: TAKE 1 TABLET DAILY   potassium chloride (K-DUR,KLOR-CON) 20 mEq tablet   No No   Sig: Take 1 tablet (20 mEq total) by mouth every other day   predniSONE 20 mg tablet   No No   Si tabs daily x 4 days, 1 1/2 tab daily x 4 days, 1 tab daily x 4 days then 1/2 tab daily x 4 day. s   sertraline (ZOLOFT) 50 mg tablet 2023 Self No Yes   Sig: Take 1 tablet (50 mg total) by mouth daily Daily at bedtime   sodium chloride 1 g tablet 2023  No Yes   Sig: TAKE 1 TABLET DAILY IN THE MORNING   sulfamethoxazole-trimethoprim (BACTRIM DS) 800-160 mg per tablet   No No   Sig: Take 1 tablet by mouth 2 (two) times a day for 14 days   tiZANidine (ZANAFLEX) 4 mg tablet   No No   Sig: Take 1 tablet (4 mg total) by mouth every 8 (eight) hours as needed for muscle spasms   traMADol (ULTRAM) 50 mg tablet   No No   Sig: TAKE 1 TABLET EVERY 6 HOURS AS NEEDED FOR MODERATE PAIN      Facility-Administered Medications: None       Portions of the record may have been created with voice recognition software. Occasional wrong word or "sound a like" substitutions may have occurred due to the inherent limitations of voice recognition software. Read the chart carefully and recognize, using context, where substitutions have occurred.     Electronically signed by:  MD Deon Castellano MD  [id number]

## 2023-12-08 NOTE — ASSESSMENT & PLAN NOTE
Wt Readings from Last 3 Encounters:   12/11/23 108 kg (238 lb 1.6 oz)   12/04/23 109 kg (241 lb)   10/25/23 104 kg (229 lb)     Chronic, with low normal systolic function and LV EF 55% base on 2022 Echo.  Examines euvolemic does not appear to be in exacerbation, held home Bumex and Metoprolol on admission in setting of low BP & ANA LILIA 2/2 Covid Pneumonia    Continue digoxin  Continue metoprolol and bumex  Fluid restriction 1500

## 2023-12-08 NOTE — ASSESSMENT & PLAN NOTE
Lab Results   Component Value Date    HGBA1C 9.8 (H) 10/19/2023       Recent Labs     12/10/23  2144 12/10/23  2204 12/11/23  0711 12/11/23  1104   POCGLU 366* 425* 211* 232*       Blood Sugar Average: Last 72 hrs:  (P) 291.4375    Chronic, uncontrolled. Complicated with peripheral neuropathy.   Continue home Lantus 50 twice daily  Insulin sliding scale + accuchecks ACHS  Diabetic diet  Continue home Gabapentin 800mg BID

## 2023-12-08 NOTE — ASSESSMENT & PLAN NOTE
COPD and chronic hypoxic respiratory failure requiring 3 L of oxygen during the day and BiPAP with 3 L of oxygen at night. Currently on baseline oxygen requirements. High risk in setting of COVID pneumonia.     Continues to be at baseline O2 requirements  Monitor spO2, continue supplemental oxygen  Continue Bipap HS  Respiratory protocol  Rest of plan as per COPD

## 2023-12-08 NOTE — PHYSICAL THERAPY NOTE
PHYSICAL THERAPY EVALUATION/TREATMENT     12/08/23 1420   PT Last Visit   PT Visit Date 12/08/23   Note Type   Note type Evaluation   Pain Assessment   Pain Assessment Tool 0-10   Pain Score 8  (Left shoulder area-chronic with worsening recently after fall just prior to admission)   Restrictions/Precautions   Other Precautions Chair Alarm; Bed Alarm;Contact/isolation; Airborne/isolation; Fall Risk;Pain;O2   Home Living   Type of Home Apartment   Home Layout One level;Elevator   [address] chair   Home Equipment Walker;Cane;Reacher;Sock aid  (O2)   Additional Comments Patient states using roller walker prior to admission   Prior Function   Level of [place] Independent with ADLs; Independent with functional mobility; Needs assistance with IADLS   Lives With Alone   Receives Help From Home health  (Home PT and OT, home health aide daily for ADLs as needed but mostly IADL completion)   Falls in the last 6 months 1 to 4   Comments Patient with a fall just prior to admission with reinjury to the left shoulder. Now awaiting x-ray results   General   Additional Pertinent History Chart reviewed, patient admitted with septic shock and COVID-positive.   Patient now presents with left shoulder pain and generalized weakness/gait dysfunction   Family/Caregiver Present No   Cognition   Overall Cognitive Status WFL   Arousal/Participation Cooperative   Attention Within functional limits   Orientation Level Oriented X4   Following Commands Follows all commands and directions without difficulty   Subjective   Subjective Patient states left shoulder pain is limiting function at this time   RLE Assessment   RLE Assessment   (Range of motion within functional limits, strength 3+/4 -)   LLE Assessment   LLE Assessment   (Range of motion within functional limits, strength 3+/4 -)   Coordination   Movements are Fluid and Coordinated 0   Coordination and Movement Description Decreased coordination with transfers and gait activity improved with use of walker   Bed Mobility   Supine to Sit 4  Minimal assistance   Additional items Assist x 1;HOB elevated  (Assist required due to left shoulder pain and not utilizing that arm)   Sit to Supine 5  Supervision   Transfers   Sit to Stand 4  Minimal assistance   Additional items Assist x 1;Verbal cues  (Cueing for use of right upper extremity to push off seated surface)   Stand to Sit 5  Supervision   Ambulation/Elevation   Gait Assistance 4  Minimal assist   Additional items Assist x 1; Tactile cues; Verbal cues   Assistive Device Rolling walker   Distance 25 feet with change in direction. Slow gait patterning and shortened stride length   Balance   Static Sitting Fair +   Dynamic Sitting Fair   Static Standing Fair   Dynamic Standing Fair -   Ambulatory Fair -   Activity Tolerance   Activity Tolerance Patient limited by fatigue;Patient limited by pain;Treatment limited secondary to medical complications (Comment)   Nurse Made Aware Yes   Assessment   Prognosis Good   Problem List Decreased strength;Decreased range of motion;Decreased endurance; Impaired balance;Decreased mobility; Decreased coordination;Pain   Assessment Patient seen for Physical Therapy evaluation. Patient admitted with Septic shock ([address]). Comorbidities affecting patient's physical performance include: . Personal factors affecting patient at time of initial evaluation include: ambulating with assistive device, inability to ambulate household distances, inability to navigate community distances, inability to navigate level surfaces without external assistance, inability to perform dynamic tasks in community, limited home support, positive fall history, inability to perform physical activity, inability to perform ADLS, and inability to perform IADLS .  Prior to admission, patient was independent with functional mobility with walker, independent with ADLS, requiring assist for IADLS, and ambulating household distance, home with home health aid and home PT/OT services. Please find objective findings from Physical Therapy assessment regarding body systems outlined above with impairments and limitations including weakness, decreased ROM, impaired balance, decreased endurance, impaired coordination, gait deviations, pain, decreased activity tolerance, decreased functional mobility tolerance, fall risk, and SOB upon exertion. The Barthel Index was used as a functional outcome tool presenting with a score of Barthel Index Score: 50 today indicating marked limitations of functional mobility and ADLS. Patient's clinical presentation is currently unstable/unpredictable as seen in patient's presentation of vital sign response, changing level of pain, increased fall risk, new onset of impairment of functional mobility, decreased endurance, and new onset of weakness. Pt would benefit from continued Physical Therapy treatment to address deficits as defined above and maximize level of functional mobility. As demonstrated by objective findings, the assigned level of complexity for this evaluation is high. The patient's -[facility] Basic Mobility Inpatient Short Form Raw Score is 19. A Raw score of greater than 16 suggests the patient may benefit from discharge to home. Please also refer to the recommendation of the Physical Therapist for safe discharge planning. Goals   Patient Goals To have left shoulder pain feel better and go home   STG Expiration Date 12/15/23   Short Term Goal #1 Transfers and gait independently with roller walker   Short Term Goal #2 Gait endurance to functional household distances   LTG Expiration Date 12/22/23   Long Term Goal #1 Strength bilateral lower extremities 4/5   Long Term Goal #2 Independent transfers and gait with roller walker as prior to admission, follow-up as needed for left shoulder to regain function   Plan   Treatment/Interventions ADL retraining;Functional transfer training;LE strengthening/ROM; Therapeutic exercise; Endurance training;Patient/family training;Equipment eval/education; Bed mobility;Gait training; Compensatory technique education   PT Frequency Other (Comment)  (5 times per week)   Discharge Recommendation   Rehab Resource Intensity Level, PT III (Minimum Resource Intensity)   AM-PAC Basic Mobility Inpatient   Turning in Flat Bed Without Bedrails 4   Lying on Back to Sitting on Edge of Flat Bed Without Bedrails 4   Moving Bed to Chair 3   Standing Up From Chair Using Arms 3   Walk in Room 3   Climb 3-5 Stairs With Railing 2   Basic Mobility Inpatient Raw Score 19   Basic Mobility Standardized Score 42.48   Highest Level Of Mobility   JH-HLM Goal 6: Walk 10 steps or more   JH-HLM Achieved 7: Walk 25 feet or more   Barthel Index   Feeding 10   Bathing 0   Grooming Score 0   Dressing Score 5   Bladder Score 10   Bowels Score 10   Toilet Use Score 5   Transfers (Bed/Chair) Score 10   Mobility (Level Surface) Score 0   Stairs Score 0   Barthel Index Score 50   Additional Treatment Session   Start Time 1405   End Time 1420   Treatment Assessment S: Patient reports 8/10 pain in left shoulder at this time(left shoulder activity deferred awaiting x-ray results) O: Bilateral lower extremity exercise completed as listed below A: Patient will benefit from continued physical therapy with progression as tolerated and increasing functional mobility with clinical staff as well   Exercises   Hip Flexion Sitting;10 reps;Bilateral  (Alternating)   Hip Abduction Sitting;10 reps;Bilateral  (Alternating)   Knee AROM Short Arc Quad Sitting;10 reps;Bilateral   Knee AROM Long Arc Quad Sitting;10 reps;Bilateral  (Alternating)   Ankle Pumps Sitting;20 reps;Bilateral  (Heel toe raises in seated position)   Balance training  Sidestepping and backward walking completed for balance and coordination   Licensure   [place] License Number  Yodit Harrison, PT 4 [id number]

## 2023-12-08 NOTE — ASSESSMENT & PLAN NOTE
Lab Results   Component Value Date    EGFR 57 12/08/2023    EGFR 79 11/24/2023    EGFR 90 11/01/2023    CREATININE 1.31 (H) 12/08/2023    CREATININE 1.00 11/24/2023    CREATININE 0.88 11/01/2023       Resume Bumex and regular home meds when appropriate

## 2023-12-08 NOTE — ASSESSMENT & PLAN NOTE
Continue home albuterol, Tessalon Perles, Flonase, and Mucinex.   Substitute hospital formulary for her home trilogy

## 2023-12-08 NOTE — TELEPHONE ENCOUNTER
Received Interim Order/Plan of Care Change from [facility]. Certification Period: 11/10//24    Scanned copy into encounter[place] [id number] [place] pre[place] in precepting room.     Fax when complete: [phone]

## 2023-12-08 NOTE — ASSESSMENT & PLAN NOTE
Covid positive with mixed GGO & consolidative change in right lower lobe on chest CT. On MILD COVID pathway due to baseline O2 requirements of 3L O2 via NC days and BIPAP nights. Higher risk as evidenced by CLL in remission, COPD, heart failure, baseline oxygen use     - EKG: atrial fibrillation (chronic), 2Hr Trop Delta -3 (12>9), BNP 71,   - CRP 1.5, LA down-trending, WBC down-trending      Empiric coverage with IV Rocephin 1g QD & IV Doxycycline Q12hrs, received total 3 days Abx treatment.   Trend CRP x3 days, downtrending on DC  Continue Eliquis 5mg PO BID  Dexamethasone 6mg IV daily  Baricitinib 4mg PO QD  Remdecivir 200mg  Continue Mucinex and Tessalon perles  OOB TID, encourage ambulation  High frequency chest wall oscillation  PT/OT

## 2023-12-08 NOTE — PLAN OF CARE
Problem: PAIN - ADULT  Goal: Verbalizes/displays adequate comfort level or baseline comfort level  Description: Interventions:  - Encourage patient to monitor pain and request assistance  - Assess pain using appropriate pain scale  - Administer analgesics based on type and severity of pain and evaluate response  - Implement non-pharmacological measures as appropriate and evaluate response  - Consider cultural and social influences on pain and pain management  - Notify physician/advanced practitioner if interventions unsuccessful or patient reports new pain  Outcome: Progressing     Problem: INFECTION - ADULT  Goal: Absence or prevention of progression during hospitalization  Description: INTERVENTIONS:  - Assess and monitor for signs and symptoms of infection  - Monitor lab/diagnostic results  - Monitor all insertion sites, i.e. indwelling lines, tubes, and drains  - Monitor endotracheal if appropriate and nasal secretions for changes in amount and color  - [place] appropriate cooling/warming therapies per order  - Administer medications as ordered  - Instruct and encourage patient and family to use good hand hygiene technique  - Identify and instruct in appropriate isolation precautions for identified infection/condition  Outcome: Progressing     Problem: SAFETY ADULT  Goal: Patient will remain free of falls  Description: INTERVENTIONS:  - Educate patient/family on patient safety including physical limitations  - Instruct patient to call for assistance with activity   - Consult OT/PT to assist with strengthening/mobility   - Keep Call bell within reach  - Keep bed low and locked with side rails adjusted as appropriate  - Keep care items and personal belongings within reach  - Initiate and maintain comfort rounds  - Make Fall Risk Sign visible to staff  - Offer Toileting every 2 Hours, in advance of need  - Initiate/Maintain bed alarm  - Obtain necessary fall risk management equipment: yes  - Apply yellow socks and bracelet for high fall risk patients  - Consider moving patient to room near nurses station  Outcome: Progressing  Goal: Maintain or return to baseline ADL function  Description: INTERVENTIONS:  -  Assess patient's ability to carry out ADLs; assess patient's baseline for ADL function and identify physical deficits which impact ability to perform ADLs (bathing, care of mouth/teeth, toileting, grooming, dressing, etc.)  - Assess/evaluate cause of self-care deficits   - Assess range of motion  - Assess patient's mobility; develop plan if impaired  - Assess patient's need for assistive devices and provide as appropriate  - Encourage maximum independence but intervene and supervise when necessary  - Involve family in performance of ADLs  - Assess for home care needs following discharge   - Consider OT consult to assist with ADL evaluation and planning for discharge  - Provide patient education as appropriate  Outcome: Progressing     Problem: RESPIRATORY - ADULT  Goal: Achieves optimal ventilation and oxygenation  Description: INTERVENTIONS:  - Assess for changes in respiratory status  - Assess for changes in mentation and behavior  - Position to facilitate oxygenation and minimize respiratory effort  - Oxygen administered by appropriate delivery if ordered  - Initiate smoking cessation education as indicated  - Encourage broncho-pulmonary hygiene including cough, deep breathe, Incentive Spirometry  - Assess the need for suctioning and aspirate as needed  - Assess and instruct to report SOB or any respiratory difficulty  - Respiratory Therapy support as indicated  Outcome: Progressing     Problem: METABOLIC, FLUID AND ELECTROLYTES - ADULT  Goal: Glucose maintained within target range  Description: INTERVENTIONS:  - Monitor Blood Glucose as ordered  - Assess for signs and symptoms of hyperglycemia and hypoglycemia  - Administer ordered medications to maintain glucose within target range  - Assess nutritional intake and initiate nutrition service referral as needed  Outcome: Progressing

## 2023-12-08 NOTE — ASSESSMENT & PLAN NOTE
Lab Results   Component Value Date    HGBA1C 9.8 (H) 10/19/2023     Recent Labs     12/08/23  2049 12/09/23  0831 12/09/23  1115 12/09/23  1556   POCGLU 388* 107 181* 324*       Blood Sugar Average: Last 72 hrs:  (P) 297.625      Takes gabapentin outpatient, adjust renally for GFR 59  -Resume home dose when appropriate

## 2023-12-08 NOTE — DISCHARGE INSTRUCTIONS
COVID-19 and Chronic Health Conditions   WHAT YOU NEED TO KNOW:   Your chronic condition may increase your risk for COVID-19 or serious problems it can cause. Healthcare providers might need to make changes that affect how you usually manage your chronic health condition. Providers may change hours of operation or not have patients come in to be seen. You may not be able to make appointments to get blood drawn or to have tests or procedures. This may continue until the virus that causes COVID-19 is controlled. Until then, you can take steps to manage your condition. The steps will also lower your risk for COVID-19 or the serious problems it causes. If you do develop COVID-19, healthcare providers will tell you when it is okay to be around others after you recover. This depends on your chronic condition, any symptoms of COVID-19 that developed, and how severe the symptoms were. DISCHARGE INSTRUCTIONS:   Call your local emergency number (911 in the [address]) if:   You have trouble breathing or shortness of breath. You have chest pain or pressure that lasts longer than 5 minutes. You become confused or hard to wake. Seek care immediately if:   The skin on your face, fingers, or toes look blue or darker than usual.      Call your doctor or specialist if:   You have a fever. You have symptoms of COVID-19. You have questions or concerns about COVID-19 or your chronic condition. What you need to know about serious problems from the virus:  You may develop long-term health problems caused by the virus. Your risk is higher if you are 65 or older. A weak immune system, obesity, diabetes, chronic kidney disease, or a heart or lung condition can also increase your risk. Your risk is also higher if you are a current or former cigarette smoker.  COVID-19 can lead to any of the following:  Multisymptom inflammatory syndrome in adults (MIS-A) or in children (MIS-C), causing inflammation in the heart, digestive system, skin, or brain    Shortness of breath, serious lower respiratory conditions, such as pneumonia or acute respiratory distress syndrome (ARDS)    Blood clots or blood vessel damage    Organ damage from a lack of oxygen or from blood clots    Sleep problems    Problems thinking clearly, remembering information, or concentrating    Mood changes, depression, or anxiety    Long-term problems tasting or smelling    Loss of appetite and weight loss    Nerve pain    Fatigue (feeling mentally and physically tired)    How the 2019 coronavirus spreads:  Close personal contact with an infected person increases your risk for infection. This means being within 6 feet (2 meters) of the person for at least 15 minutes over 24 hours. The virus travels in droplets that form when a person talks, sings, coughs, or sneezes. The droplets can also float in the air for minutes or hours. Infection happens when you breathe in the droplets or get them in your eyes or nose. Person-to-person contact can spread the virus. For example, a person with the virus on his or her hands can spread it by shaking hands with someone. The virus can stay on objects and surfaces for hours to days. You may become infected by touching the object or surface and then touching your eyes or mouth. What you need to know about the signs and symptoms of COVID-19:  Signs and symptoms usually start about 5 days after infection but can take 2 to 14 days. Signs and symptoms range from mild to severe. You may feel like you have the flu or a bad cold. Your chronic health condition may cause some of the same symptoms COVID-19 causes. This can make it hard to know if a symptom is from COVID-19 or your chronic condition. Keep a record of any new or worsening symptom you have. This is especially important if you have a condition that often causes shortness of breath. Your provider can tell you if you should be tested for COVID-19.  Tell your healthcare provider if you think you were infected but develop signs or symptoms not listed below:  A cough    Shortness of breath or trouble breathing that may become severe    A fever of at least 100.4°F, or 38°C (may be lower in adults 65 or older)    Chills that might include shaking    Muscle pain, body aches, or a headache    A sore throat    Suddenly not being able to taste or smell anything    Feeling very tired (fatigue)    Congestion (stuffy head and nose), or a runny nose    Diarrhea, nausea, or vomiting    Manage your chronic health condition:  If you do not have a regular healthcare provider, experts recommend you contact a local [facility] health center or health department. The following can help you manage your condition and prevent COVID-19 during an active outbreak in your area:  Get emergency care for your condition if needed. Talk to your healthcare providers about symptoms of your chronic condition that need immediate care. Your providers can help you create a plan or add exacerbation management to your plan. The plan will include when to go to an emergency department and when to call your local emergency number. This will depend on where you live and the services that are available. Go to dialysis appointments as scheduled. It is important to stay on schedule. You will need to have enough food to be able to follow the emergency diet plan if you must miss a session. The emergency diet needs to be part of the management plan for your condition. Ask your healthcare provider for other ways to have appointments. Some providers offer phone, video, or other types of appointments. Follow any regular management plan you use. Your healthcare provider will tell you if you need to make any changes to your regular management plan. For example, if you have asthma, continue to follow your asthma action plan. If you have diabetes, you may need to check your blood sugar level more often.  Stress and illness can make blood sugar levels go up. You may need to adjust medicine such as insulin. If you have a heart condition or high blood pressure, you may need to check your blood pressure more often. Stress and illness can also raise your blood pressure. Talk to your healthcare providers about your medicines. You may be able to get more than 1 month of medicine at a time. This will lower the number of times you need to go to a pharmacy to get your medicines. Make sure you have enough medicine if you have a condition that can lead to an emergency. Examples include asthma medicines, insulin, or an epinephrine pen. Check the expiration dates on the medicines you currently have. Ask for refills as soon as possible, if needed. If it is not time to refill prescriptions, you may be able to get an emergency supply of some medicines. Medicine plans vary, so ask your healthcare provider or pharmacist for options. Have supplies available in your home or delivered as needed. If possible, get extra supplies you use regularly. Examples include absorbent pads, syringes, and wound cleaning solutions. This will limit the number of trips out of your home to get supplies. Have food, medicines, and other supplies delivered. You can instead ask someone who does not have COVID-19 to get items you need. What you need to know about COVID-19 vaccines: Your healthcare provider can give you more information about what to expect, depending on your chronic condition. You are considered fully vaccinated against COVID-19 two weeks after the final dose of any COVID-19 vaccine. Let your healthcare provider know when you have received the final dose of the vaccine. Make a copy of your vaccination card. Keep the original with you in case you need to show it. Keep the copy in a safe place. Get a COVID-19 vaccine as directed. Vaccination is recommended for everyone 6 months or older. COVID-19 vaccines are given as a shot in 1 to 3 doses as a primary series. This depends on the vaccine brand and the age of the person who receives it. A booster dose is recommended for everyone 5 years or older after the primary series is complete. A second booster  is recommended for all adults 48 or older and for immunocompromised adolescents. The second booster is also recommended for anyone who got the 1-dose brand of vaccine for the first dose and a booster. Your provider can give you more information on boosters and help you schedule all needed doses. Continue to protect yourself and others. You can become infected even after you get the vaccine. You may also be able to pass the virus to others without knowing you are infected. After you get the vaccine, check local, national, and international travel rules. You may need to be tested before you travel. Some countries require proof of a negative test before you travel. You may also need to quarantine after you return. Medicine may be given to prevent infection. The medicine can be given if you are at high risk for infection and cannot get the vaccine. It can also be given if your immune system does not respond well to the vaccine. Lower your risk for COVID-19:   Stay home if you are sick or think you may have COVID-19. It is important to stay home if you are waiting for a testing appointment or for test results. Wash your hands often throughout the day. Use soap and water. Rub your soapy hands together, lacing your fingers, for at least 20 seconds. Rinse with warm, running water. Dry your hands with a clean towel or paper towel. Use hand  that contains alcohol if soap and water are not available. Teach children how to wash their hands and use hand . Cover sneezes and coughs. Turn your face away and cover your mouth and nose with a tissue. Throw the tissue away. Use the bend of your arm if a tissue is not available. Then wash your hands with soap and water or use hand . Teach children how to cover a cough or sneeze. Wear a face covering (mask) when needed. Use a cloth covering with at least 2 layers. You can also create layers by putting a cloth covering over a disposable non-medical mask. Cover your mouth and your nose. Try to keep space between you and others when you are out of the house. Avoid crowds as much as possible. Wear a face covering when you must be around a large group and cannot keep space between you and others. Clean and disinfect high-touch surfaces and objects in your home often. Use disinfecting wipes, or make a solution by mixing 4 teaspoons of bleach with 1 quart (4 cups) of water. Do not  use any cleaning or disinfecting products that can trigger an asthma attack or other breathing problems. Open windows or have circulating air as you clean. Do not  mix ammonia with bleach. This will create toxic fumes. Help strengthen your immune system:   Ask about other vaccines you may need. Get the influenza (flu) vaccine as soon as recommended each year, usually starting in September or October. Get the pneumonia vaccine if recommended. Your healthcare provider can tell you if you should also get other vaccines, and when to get them. Do not smoke. Nicotine and other chemicals in cigarettes and cigars can increase your risk for infection and for serious COVID-19 effects. Ask your healthcare provider for information if you currently smoke and need help to quit. E-cigarettes or smokeless tobacco still contain nicotine. Talk to your healthcare provider before you use these products. Eat a variety of healthy foods. Examples include vegetables, fruits, whole-grain breads and cereals, lean meats and poultry, fish, low-fat dairy products, and cooked beans. Healthy foods contain nutrients that help keep your immune system strong. Find ways to manage stress. Talk to your healthcare providers about ways to manage stress.  Stress can lead to breathing problems or trigger an attack or exacerbation of many health conditions. Do things you enjoy to help you relax. For example, talk to a friend, read a book or magazine, or listen to soothing music. Follow up with your doctor or specialist as directed: Your providers will tell you when you can come in for tests, procedures, or check-ups. Bring your symptom record with you to all appointments. Write down your questions so you remember to ask them during your visits. For more information:   [facility] [employer] [facility] Prevention  [facility]  [place]  Phone: 1- 313 - 5621437  Phone: 9- 858 - 5391430  Web Address: [employer]    © Copyright Jacky [employer]uman 2023 Information is for End User's use only and may not be sold, redistributed or otherwise used for commercial purposes. The above information is an  only. It is not intended as medical advice for individual conditions or treatments. Talk to your doctor, nurse or pharmacist before following any medical regimen to see if it is safe and effective for you.

## 2023-12-08 NOTE — ASSESSMENT & PLAN NOTE
Likely Prerenal, presented with CR of 1.31. Currently at baseline Cr.     Limit nephrotoxins as possible hold home losartan etc.  S/p fluid bolus in ED

## 2023-12-09 PROBLEM — J12.82 PNEUMONIA DUE TO COVID-19 VIRUS: Status: ACTIVE | Noted: 2023-12-08

## 2023-12-09 PROBLEM — I50.32 CHRONIC DIASTOLIC CONGESTIVE HEART FAILURE (HCC): Status: ACTIVE | Noted: 2020-11-17

## 2023-12-09 PROBLEM — N17.9 AKI (ACUTE KIDNEY INJURY) (HCC): Status: RESOLVED | Noted: 2017-03-20 | Resolved: 2023-12-09
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
## 2023-12-09 LAB
ALBUMIN SERPL BCP-MCNC: 3.7 G/DL (ref 3.5–5)
ALP SERPL-CCNC: 43 U/L (ref 34–104)
ALT SERPL W P-5'-P-CCNC: 15 U/L (ref 7–52)
ANION GAP SERPL CALCULATED.3IONS-SCNC: 8 MMOL/L
AST SERPL W P-5'-P-CCNC: 12 U/L (ref 13–39)
BACTERIA UR CULT: NORMAL
BASOPHILS # BLD AUTO: 0.05 THOUSANDS/ÂΜL (ref 0–0.1)
BASOPHILS NFR BLD AUTO: 0 % (ref 0–1)
BILIRUB SERPL-MCNC: 0.65 MG/DL (ref 0.2–1)
BUN SERPL-MCNC: 13 MG/DL (ref 5–25)
CALCIUM SERPL-MCNC: 9.1 MG/DL (ref 8.4–10.2)
CHLORIDE SERPL-SCNC: 101 MMOL/L (ref 96–108)
CO2 SERPL-SCNC: 27 MMOL/L (ref 21–32)
CREAT SERPL-MCNC: 0.67 MG/DL (ref 0.6–1.3)
EOSINOPHIL # BLD AUTO: 0.07 THOUSAND/ÂΜL (ref 0–0.61)
EOSINOPHIL NFR BLD AUTO: 0 % (ref 0–6)
ERYTHROCYTE [DISTWIDTH] IN BLOOD BY AUTOMATED COUNT: 13 % (ref 11.6–15.1)
GFR SERPL CREATININE-BSD FRML MDRD: 101 ML/MIN/1.73SQ M
GLUCOSE SERPL-MCNC: 107 MG/DL (ref 65–140)
GLUCOSE SERPL-MCNC: 181 MG/DL (ref 65–140)
GLUCOSE SERPL-MCNC: 324 MG/DL (ref 65–140)
GLUCOSE SERPL-MCNC: 419 MG/DL (ref 65–140)
GLUCOSE SERPL-MCNC: 79 MG/DL (ref 65–140)
HCT VFR BLD AUTO: 37.1 % (ref 36.5–49.3)
HGB BLD-MCNC: 12.9 G/DL (ref 12–17)
IMM GRANULOCYTES # BLD AUTO: 0.08 THOUSAND/UL (ref 0–0.2)
IMM GRANULOCYTES NFR BLD AUTO: 0 % (ref 0–2)
LYMPHOCYTES # BLD AUTO: 13.72 THOUSANDS/ÂΜL (ref 0.6–4.47)
LYMPHOCYTES NFR BLD AUTO: 70 % (ref 14–44)
MAGNESIUM SERPL-MCNC: 2 MG/DL (ref 1.9–2.7)
MCH RBC QN AUTO: 32.5 PG (ref 26.8–34.3)
MCHC RBC AUTO-ENTMCNC: 34.8 G/DL (ref 31.4–37.4)
MCV RBC AUTO: 94 FL (ref 82–98)
MONOCYTES # BLD AUTO: 0.59 THOUSAND/ÂΜL (ref 0.17–1.22)
MONOCYTES NFR BLD AUTO: 3 % (ref 4–12)
NEUTROPHILS # BLD AUTO: 5.41 THOUSANDS/ÂΜL (ref 1.85–7.62)
NEUTS SEG NFR BLD AUTO: 27 % (ref 43–75)
NRBC BLD AUTO-RTO: 0 /100 WBCS
PLATELET # BLD AUTO: 175 THOUSANDS/UL (ref 149–390)
PMV BLD AUTO: 9.6 FL (ref 8.9–12.7)
POTASSIUM SERPL-SCNC: 3.4 MMOL/L (ref 3.5–5.3)
PROT SERPL-MCNC: 6.2 G/DL (ref 6.4–8.4)
RBC # BLD AUTO: 3.97 MILLION/UL (ref 3.88–5.62)
SODIUM SERPL-SCNC: 136 MMOL/L (ref 135–147)
WBC # BLD AUTO: 19.92 THOUSAND/UL (ref 4.31–10.16)

## 2023-12-09 PROCEDURE — 85025 COMPLETE CBC W/AUTO DIFF WBC: CPT | Performed by: STUDENT IN AN ORGANIZED HEALTH CARE EDUCATION/TRAINING PROGRAM

## 2023-12-09 PROCEDURE — 83735 ASSAY OF MAGNESIUM: CPT

## 2023-12-09 PROCEDURE — 94668 MNPJ CHEST WALL SBSQ: CPT

## 2023-12-09 PROCEDURE — 82948 REAGENT STRIP/BLOOD GLUCOSE: CPT

## 2023-12-09 PROCEDURE — 99232 SBSQ HOSP IP/OBS MODERATE 35: CPT | Performed by: STUDENT IN AN ORGANIZED HEALTH CARE EDUCATION/TRAINING PROGRAM

## 2023-12-09 PROCEDURE — 94760 N-INVAS EAR/PLS OXIMETRY 1: CPT

## 2023-12-09 PROCEDURE — 94640 AIRWAY INHALATION TREATMENT: CPT

## 2023-12-09 PROCEDURE — 94669 MECHANICAL CHEST WALL OSCILL: CPT

## 2023-12-09 PROCEDURE — 80053 COMPREHEN METABOLIC PANEL: CPT | Performed by: STUDENT IN AN ORGANIZED HEALTH CARE EDUCATION/TRAINING PROGRAM

## 2023-12-09 RX ORDER — CEFTRIAXONE 1 G/50ML
1000 INJECTION, SOLUTION INTRAVENOUS EVERY 24 HOURS
Status: DISCONTINUED | OUTPATIENT
Start: 2023-12-09 | End: 2023-12-11 | Stop reason: HOSPADM

## 2023-12-09 RX ORDER — LIDOCAINE 40 MG/G
CREAM TOPICAL 4 TIMES DAILY PRN
Status: DISCONTINUED | OUTPATIENT
Start: 2023-12-09 | End: 2023-12-09

## 2023-12-09 RX ORDER — DEXAMETHASONE SODIUM PHOSPHATE 4 MG/ML
6 INJECTION, SOLUTION INTRA-ARTICULAR; INTRALESIONAL; INTRAMUSCULAR; INTRAVENOUS; SOFT TISSUE EVERY 24 HOURS
Status: DISCONTINUED | OUTPATIENT
Start: 2023-12-09 | End: 2023-12-11 | Stop reason: HOSPADM

## 2023-12-09 RX ORDER — DOXYCYCLINE HYCLATE 100 MG/1
100 CAPSULE ORAL EVERY 12 HOURS SCHEDULED
Status: DISCONTINUED | OUTPATIENT
Start: 2023-12-09 | End: 2023-12-09

## 2023-12-09 RX ORDER — LIDOCAINE 50 MG/G
1 PATCH TOPICAL DAILY
Status: DISCONTINUED | OUTPATIENT
Start: 2023-12-09 | End: 2023-12-11 | Stop reason: HOSPADM

## 2023-12-09 RX ORDER — ACETAMINOPHEN 10 MG/ML
1000 INJECTION, SOLUTION INTRAVENOUS EVERY 6 HOURS PRN
Status: DISCONTINUED | OUTPATIENT
Start: 2023-12-09 | End: 2023-12-11 | Stop reason: HOSPADM

## 2023-12-09 RX ORDER — POTASSIUM CHLORIDE 20 MEQ/1
40 TABLET, EXTENDED RELEASE ORAL ONCE
Status: COMPLETED | OUTPATIENT
Start: 2023-12-09 | End: 2023-12-09

## 2023-12-09 RX ADMIN — Medication 1 TABLET: at 08:41

## 2023-12-09 RX ADMIN — ATORVASTATIN CALCIUM 10 MG: 10 TABLET, FILM COATED ORAL at 08:34

## 2023-12-09 RX ADMIN — TIZANIDINE 4 MG: 2 TABLET ORAL at 21:11

## 2023-12-09 RX ADMIN — INSULIN LISPRO 20 UNITS: 100 INJECTION, SOLUTION INTRAVENOUS; SUBCUTANEOUS at 21:09

## 2023-12-09 RX ADMIN — REMDESIVIR 100 MG: 100 INJECTION, POWDER, LYOPHILIZED, FOR SOLUTION INTRAVENOUS at 11:12

## 2023-12-09 RX ADMIN — DOXYCYCLINE 100 MG: 100 INJECTION, POWDER, LYOPHILIZED, FOR SOLUTION INTRAVENOUS at 21:13

## 2023-12-09 RX ADMIN — GUAIFENESIN 1200 MG: 600 TABLET, EXTENDED RELEASE ORAL at 21:11

## 2023-12-09 RX ADMIN — FLUTICASONE PROPIONATE 1 SPRAY: 50 SPRAY, METERED NASAL at 08:42

## 2023-12-09 RX ADMIN — IPRATROPIUM BROMIDE AND ALBUTEROL SULFATE 3 ML: 2.5; .5 SOLUTION RESPIRATORY (INHALATION) at 07:47

## 2023-12-09 RX ADMIN — DOCUSATE SODIUM 100 MG: 100 CAPSULE, LIQUID FILLED ORAL at 17:49

## 2023-12-09 RX ADMIN — ALBUTEROL SULFATE 2.5 MG: 2.5 SOLUTION RESPIRATORY (INHALATION) at 16:00

## 2023-12-09 RX ADMIN — INSULIN GLARGINE 50 UNITS: 100 INJECTION, SOLUTION SUBCUTANEOUS at 08:32

## 2023-12-09 RX ADMIN — TRAMADOL HYDROCHLORIDE 50 MG: 50 TABLET, COATED ORAL at 08:33

## 2023-12-09 RX ADMIN — INSULIN LISPRO 4 UNITS: 100 INJECTION, SOLUTION INTRAVENOUS; SUBCUTANEOUS at 11:34

## 2023-12-09 RX ADMIN — Medication 1000 UNITS: at 08:33

## 2023-12-09 RX ADMIN — OMEGA-3 FATTY ACIDS CAP 1000 MG 1000 MG: 1000 CAP at 17:49

## 2023-12-09 RX ADMIN — IPRATROPIUM BROMIDE AND ALBUTEROL SULFATE 3 ML: 2.5; .5 SOLUTION RESPIRATORY (INHALATION) at 01:38

## 2023-12-09 RX ADMIN — Medication 400 MG: at 08:41

## 2023-12-09 RX ADMIN — FLUTICASONE FUROATE AND VILANTEROL TRIFENATATE 1 PUFF: 100; 25 POWDER RESPIRATORY (INHALATION) at 08:42

## 2023-12-09 RX ADMIN — OMEGA-3 FATTY ACIDS CAP 1000 MG 1000 MG: 1000 CAP at 08:37

## 2023-12-09 RX ADMIN — GABAPENTIN 800 MG: 400 CAPSULE ORAL at 17:49

## 2023-12-09 RX ADMIN — LIDOCAINE 5% 1 PATCH: 700 PATCH TOPICAL at 18:52

## 2023-12-09 RX ADMIN — LAMOTRIGINE 25 MG: 25 TABLET ORAL at 21:11

## 2023-12-09 RX ADMIN — OXYCODONE HYDROCHLORIDE AND ACETAMINOPHEN 1000 MG: 500 TABLET ORAL at 08:33

## 2023-12-09 RX ADMIN — DEXAMETHASONE SODIUM PHOSPHATE 6 MG: 4 INJECTION INTRA-ARTICULAR; INTRALESIONAL; INTRAMUSCULAR; INTRAVENOUS; SOFT TISSUE at 17:49

## 2023-12-09 RX ADMIN — TIZANIDINE 4 MG: 2 TABLET ORAL at 11:34

## 2023-12-09 RX ADMIN — TRAMADOL HYDROCHLORIDE 50 MG: 50 TABLET, COATED ORAL at 16:01

## 2023-12-09 RX ADMIN — UMECLIDINIUM 1 PUFF: 62.5 AEROSOL, POWDER ORAL at 08:42

## 2023-12-09 RX ADMIN — PANTOPRAZOLE SODIUM 40 MG: 40 TABLET, DELAYED RELEASE ORAL at 08:47

## 2023-12-09 RX ADMIN — APIXABAN 5 MG: 5 TABLET, FILM COATED ORAL at 17:49

## 2023-12-09 RX ADMIN — GABAPENTIN 800 MG: 400 CAPSULE ORAL at 08:34

## 2023-12-09 RX ADMIN — IPRATROPIUM BROMIDE AND ALBUTEROL SULFATE 3 ML: 2.5; .5 SOLUTION RESPIRATORY (INHALATION) at 13:36

## 2023-12-09 RX ADMIN — INSULIN LISPRO 12 UNITS: 100 INJECTION, SOLUTION INTRAVENOUS; SUBCUTANEOUS at 16:01

## 2023-12-09 RX ADMIN — DIGOXIN 250 MCG: 125 TABLET ORAL at 08:32

## 2023-12-09 RX ADMIN — BUSPIRONE HYDROCHLORIDE 10 MG: 10 TABLET ORAL at 17:49

## 2023-12-09 RX ADMIN — QUETIAPINE FUMARATE 300 MG: 100 TABLET ORAL at 21:11

## 2023-12-09 RX ADMIN — GUAIFENESIN 1200 MG: 600 TABLET, EXTENDED RELEASE ORAL at 08:34

## 2023-12-09 RX ADMIN — BENZONATATE 100 MG: 100 CAPSULE ORAL at 21:11

## 2023-12-09 RX ADMIN — BARICITINIB 4 MG: 2 TABLET, FILM COATED ORAL at 18:53

## 2023-12-09 RX ADMIN — SODIUM CHLORIDE 1 G: 1 TABLET ORAL at 08:41

## 2023-12-09 RX ADMIN — IPRATROPIUM BROMIDE AND ALBUTEROL SULFATE 3 ML: 2.5; .5 SOLUTION RESPIRATORY (INHALATION) at 20:20

## 2023-12-09 RX ADMIN — POTASSIUM CHLORIDE 40 MEQ: 1500 TABLET, EXTENDED RELEASE ORAL at 08:48

## 2023-12-09 RX ADMIN — SERTRALINE HYDROCHLORIDE 50 MG: 50 TABLET ORAL at 08:47

## 2023-12-09 RX ADMIN — BUSPIRONE HYDROCHLORIDE 10 MG: 10 TABLET ORAL at 08:34

## 2023-12-09 RX ADMIN — DOCUSATE SODIUM 100 MG: 100 CAPSULE, LIQUID FILLED ORAL at 08:34

## 2023-12-09 RX ADMIN — BENZONATATE 100 MG: 100 CAPSULE ORAL at 15:52

## 2023-12-09 RX ADMIN — CEFTRIAXONE 1000 MG: 1 INJECTION, SOLUTION INTRAVENOUS at 17:49

## 2023-12-09 RX ADMIN — INSULIN GLARGINE 50 UNITS: 100 INJECTION, SOLUTION SUBCUTANEOUS at 21:08

## 2023-12-09 RX ADMIN — APIXABAN 5 MG: 5 TABLET, FILM COATED ORAL at 08:34

## 2023-12-09 NOTE — ASSESSMENT & PLAN NOTE
Reports fall at home prior to admission with point tenderness in right shoulder with decreased ROM.  strength and distal sensory intact.  No bruising or superficial injury noted on exam.  - CT head & cervical spine: no acute osseous abnormality  - XR L shoulder: no acute osseous abnormality (pending official read)  Starting Ofirmev and Tramadol PRN  Starting Lidocaine 5% patch  MRI for possible rotator cuff injury on discharge  PT/OT
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
## 2023-12-09 NOTE — ED PROCEDURE NOTE
PROCEDURE  CriticalCare Time    Date/Time: 12/8/2023 7:17 AM    Performed by: Anabelle Zurita MD  Authorized by: Anabelle Zurita MD    Critical care provider statement:     Critical care time (minutes):  80    Critical care time was exclusive of:  Separately billable procedures and treating other patients and teaching time    Critical care was necessary to treat or prevent imminent or life-threatening deterioration of the following conditions:  Shock, sepsis, circulatory failure and dehydration    Critical care was time spent personally by me on the following activities:  Blood draw for specimens, obtaining history from patient or surrogate, discussions with primary provider, evaluation of patient's response to treatment, examination of patient, review of old charts, re-evaluation of patient's condition, ordering and review of radiographic studies, ordering and review of laboratory studies and ordering and performing treatments and interventions       Anabelle Zurita MD  12/09/23 0977

## 2023-12-09 NOTE — PLAN OF CARE
Problem: PAIN - ADULT  Goal: Verbalizes/displays adequate comfort level or baseline comfort level  Description: Interventions:  - Encourage patient to monitor pain and request assistance  - Assess pain using appropriate pain scale  - Administer analgesics based on type and severity of pain and evaluate response  - Implement non-pharmacological measures as appropriate and evaluate response  - Consider cultural and social influences on pain and pain management  - Notify physician/advanced practitioner if interventions unsuccessful or patient reports new pain  Outcome: Progressing     Problem: INFECTION - ADULT  Goal: Absence or prevention of progression during hospitalization  Description: INTERVENTIONS:  - Assess and monitor for signs and symptoms of infection  - Monitor lab/diagnostic results  - Monitor all insertion sites, i.e. indwelling lines, tubes, and drains  - Monitor endotracheal if appropriate and nasal secretions for changes in amount and color  - [place] appropriate cooling/warming therapies per order  - Administer medications as ordered  - Instruct and encourage patient and family to use good hand hygiene technique  - Identify and instruct in appropriate isolation precautions for identified infection/condition  Outcome: Progressing     Problem: SAFETY ADULT  Goal: Patient will remain free of falls  Description: INTERVENTIONS:  - Educate patient/family on patient safety including physical limitations  - Instruct patient to call for assistance with activity   - Consult OT/PT to assist with strengthening/mobility   - Keep Call bell within reach  - Keep bed low and locked with side rails adjusted as appropriate  - Keep care items and personal belongings within reach  - Initiate and maintain comfort rounds  - Make Fall Risk Sign visible to staff  - Offer Toileting every 2 Hours, in advance of need  - Initiate/Maintain bed alarm  - Obtain necessary fall risk management equipment: yes  - Apply yellow socks and bracelet for high fall risk patients  - Consider moving patient to room near nurses station  Outcome: Progressing  Goal: Maintain or return to baseline ADL function  Description: INTERVENTIONS:  -  Assess patient's ability to carry out ADLs; assess patient's baseline for ADL function and identify physical deficits which impact ability to perform ADLs (bathing, care of mouth/teeth, toileting, grooming, dressing, etc.)  - Assess/evaluate cause of self-care deficits   - Assess range of motion  - Assess patient's mobility; develop plan if impaired  - Assess patient's need for assistive devices and provide as appropriate  - Encourage maximum independence but intervene and supervise when necessary  - Involve family in performance of ADLs  - Assess for home care needs following discharge   - Consider OT consult to assist with ADL evaluation and planning for discharge  - Provide patient education as appropriate  Outcome: Progressing     Problem: RESPIRATORY - ADULT  Goal: Achieves optimal ventilation and oxygenation  Description: INTERVENTIONS:  - Assess for changes in respiratory status  - Assess for changes in mentation and behavior  - Position to facilitate oxygenation and minimize respiratory effort  - Oxygen administered by appropriate delivery if ordered  - Initiate smoking cessation education as indicated  - Encourage broncho-pulmonary hygiene including cough, deep breathe, Incentive Spirometry  - Assess the need for suctioning and aspirate as needed  - Assess and instruct to report SOB or any respiratory difficulty  - Respiratory Therapy support as indicated  Outcome: Progressing     Problem: METABOLIC, FLUID AND ELECTROLYTES - ADULT  Goal: Glucose maintained within target range  Description: INTERVENTIONS:  - Monitor Blood Glucose as ordered  - Assess for signs and symptoms of hyperglycemia and hypoglycemia  - Administer ordered medications to maintain glucose within target range  - Assess nutritional intake and initiate nutrition service referral as needed  Outcome: Progressing

## 2023-12-09 NOTE — PROGRESS NOTES
Standard Teaching Supervising Statement     Patient was seen and examined at bedside today. I have reviewed the note performed and documented by the Resident. I personally performed the required components/examined the patient. I supervised the Resident and I agree with the Resident findings and plan of care during admission with the following additions/exceptions:     Patient seen and examined at bedside, reported that he still just felt lousy. Reported shoulder pain and inability to move arm. Requested pain medication. Patient denied any headache, blurry vision worsening shortness of breath.     PE:  General-NAD, obese, NC  HEENT- no nystagmus, perrla  Respi-non-labored, right-sided wheezing  Cardio- no murmor, no rubs, no gallops  Abdomen-soft nontender, no rebounding no guarding  Msk-left shoulder-DROM, tenderness,  BLLE pedal edema  Psych- cooperative     A/P:  Septic Shock-panculture/lactate/Pro-Campos, likely 2/2 to COVID,  imaging -ggo / r/o cystitis, and UA unremarkable, continue empiric abx  COVID- remdesimir, dexamethasone, O2 support,  Chronic hypoxic respiratory failure-at baseline, supportive care  Fall with shoulder pain-drom, lidocaine shoulder x-ray still pending, ice, PT OT   A/C-HF- continue home metoprol, and diuretic acutely due to ANA LILIA close GERD  SHAVONNE- CPAP qhs  DMT2-9.8%, basal/prandial/SSI, D&E on DC  Psych disorder-continue home Zoloft and BuSpar  ANA LILIA -improving, likely secondary to dehydration, gently replenish fluids, hold diuretics, avoid neprhrotoxs  Anemia normocytic, will monitor, consider peripheral blood smear  CML-monitor for signs of infection, no noted active therapy     All chronic conditions controlled Home medication inpatient equivalent     Daniela Owusu MD Hospitalist

## 2023-12-09 NOTE — ASSESSMENT & PLAN NOTE
Acute on Chronic COPD with home 3L O2 in setting of COVID pneumonia.  At home on Trelegy, Flonase, Duonebs, & Albuterol PRN  Continue home meds with hospital equivalents  Incentive spirometry  O2 supplementation, titrate for spO2 88-92%  Rest of plan as per 'Pneumonia due to Covid'

## 2023-12-09 NOTE — ASSESSMENT & PLAN NOTE
Chronic, at home on Digoxin 0.25mcg PO daily & Metoprolol 200mg PO QD.  - EKG: Atrial fibrillation without RVR  Chads Vasc 3  Continue Eliquis  Continue Digoxin  Continue metoprolol

## 2023-12-09 NOTE — PROGRESS NOTES
Daily Progress Note - [address] D Watson 58 y.o. male MRN: [id number]  Unit/Bed#: [address] [place] [address] Encounter: [id number]  Admitting Physician: Charanjit Carcamo MD   PCP: MANAS Payne  Date of Admission:  12/8/2023 12:51 AM    Assessment and Plan    * Septic shock [facility])  Assessment & Plan  Prior to arrival as evidenced by hypotension 60/41, leukocytosis 28.20, tachypnea RR 21, in setting of COVID-19. LA 2.4    ED: IV NS 2L bolus    CT C/A/P: Mixed groundglass and consolidative change in the right lower lobe. Mucus plugging of some distal branches in the right lower lobe. Mediastinal and right hilar lymphadenopathy. Large amount of stool in the rectum and sigmoid colon. Mild diffuse wall thickening of the urinary bladder. Prostatomegaly. More likely COVID pneumonia r/o lobar/aspiration pneumonia, less likely Acute Colitis vs Acute cystitis   - COVID positive  - Bcx 1/2 neg, Bcx 2/2 gram+ likely contaminant  - UA & Ucx negative  - Pt reports normal bowel movements and no abdominal pain    Continue empiric coverage with IV Rocephin 1g QD & IV Doxycycline Q12hrs  Avoiding IVF due to risk of CHF exacerbation  Rest of A/P as per 'Pneumonia due to [place]' - Mild Pathway    Pneumonia due to COVID-19 virus  Assessment & Plan  Covid positive with mixed GGO & consolidative change in right lower lobe on chest CT. On MILD COVID pathway due to baseline O2 requirements of 3L O2 via NC days and BIPAP nights.   Higher risk as evidenced by CLL in remission, COPD, heart failure, baseline oxygen use     - EKG: atrial fibrillation (chronic), 2Hr Trop Delta -3 (12>9), BNP 71,   - CRP 1.5, LA down-trending, WBC down-trending    On PE worsening cough with right sided wheezing    Trend CRP x3 days  Continue Eliquis 5mg PO BID  Dexamethasone 6mg IV daily for 10 days  Baricitinib 4mg PO QD for 14 days, d/c if clinically stable for discharge  Remdecivir 200mg IV day 1, then 100mg IV QD for 4 days  Continue Mucinex and Tessalon perles  OOB TID, encourage ambulation  High frequency chest wall oscillation  PT/OT    COPD with exacerbation (HCC)  Assessment & Plan  Acute on Chronic COPD with home 3L O2 in setting of COVID pneumonia. At home on Trelegy, Flonase, Duonebs, & Albuterol PRN  Continue home meds with hospital equivalents  Incentive spirometry  O2 supplementation, titrate for spO2 88-93%  Rest of plan as per 'Pneumonia due to Covid'    ANA LILIA (acute kidney injury) (HCC)-resolved as of 12/9/2023  Assessment & Plan  Likely Prerenal, presented with CR of 1.31. Currently at baseline Cr. Limit nephrotoxins as possible hold home losartan etc.  S/p fluid bolus in ED    [address]  Reports fall at home prior to admission with point tenderness in right shoulder with decreased ROM.  strength and distal sensory intact. No bruising or superficial injury noted on exam.  - CT head & cervical spine: no acute osseous abnormality  - XR L shoulder: no acute osseous abnormality (pending official read)  Starting Ofirmev and Tramadol PRN  Starting Lidocaine 5% patch  Consider MRI for possible rotator cuff injury  PT/OT    Chronic diastolic congestive heart failure (HCC)  Assessment & Plan  Wt Readings from Last 3 Encounters:   12/04/23 109 kg (241 lb)   10/25/23 104 kg (229 lb)   09/05/23 109 kg (240 lb)   Chronic, with low normal systolic function and LV EF 55% base on 2022 Echo  - Examines euvolemic does not appear to be in exacerbation, held home Bumex and Metoprolol in setting of low BP & ANA LILIA 2/2 Covid Pneumonia  - BNP 71, 2Hr Trop Delta -3 (12>9)  Continue digoxin    Chronic lymphocytic leukemia of B-cell type in remission [facility])  Assessment & Plan  Chronic, pt follows with Dr Elpidio Watson, was told he doesn't need to be on treatment.       Hyperlipidemia  Assessment & Plan  Continue home Lipitor and formulary substitution for his omega-3    Chronic a-fib [facility])  Assessment & Plan  Chronic, at home on Digoxin 0.25mcg PO daily & Metoprolol 200mg PO QD.  - EKG: Atrial fibrillation without RVR  Continue Digoxin  Held Metoprolol on admission due to Soft BPs    Obstructive sleep apnea  Assessment & Plan  BiPAP at bedtime    Type 2 diabetes mellitus with complication, with long-term current use of insulin [facility])  Assessment & Plan  Lab Results   Component Value Date    HGBA1C 9.8 (H) 10/19/2023       Recent Labs     12/08/23  2049 12/09/23  0831 12/09/23  1115 12/09/23  1556   POCGLU 388* 107 181* 324*       Blood Sugar Average: Last 72 hrs:  (P) 862.704    Chronic complicated with peripheral neuropathy. Continue home Lantus 50 twice daily  Insulin sliding scale  Continue with a carb controlled diet  Continue home Gabapentin 800mg BID    Chronic respiratory failure (HCC)  Assessment & Plan  COPD and chronic hypoxic respiratory failure requiring 3 L of oxygen during the day and BiPAP with 3 L of oxygen at night. Currently on baseline oxygen requirements. High risk in setting of COVID pneumonia. Monitor spO2, continue supplemental oxygen  Respiratory protocol  Rest of plan as per COPD    GERD  Assessment & Plan  Continue home Protonix    Essential hypertension  Assessment & Plan  Presented with hypotension hold all antihypertensives at this time    Coronary artery disease  Assessment & Plan  Chronic, stable. - 2Hr Trop Delta -3 (12>9), rate controlled afib on EKG   Holding home metoprolol in setting of soft BP on admission    Morbid obesity   Assessment & Plan  Consider nutrition consult when appropriate as after patient's status has improved    Psychiatric disorder  Assessment & Plan  Continue home BuSpar and sertraline        VTE Pharmacologic Prophylaxis: VTE Score: 9 High Risk (Score >/= 5) - Pharmacological DVT Prophylaxis Ordered: apixaban (Eliquis). Sequential Compression Devices Ordered. Patient Centered Rounds: I have performed bedside rounds with nursing staff today.     Discussions with Specialists or Other Care Team Provider: none    Education and Discussions with Family / Patient: yes  Patient Information Sharing: With the consent of Kasandra Rojas , their loved ones were notified today by inpatient team of the patient’s condition and current plan. All questions answered. Time Spent for Care: 20 minutes. More than 50% of total time spent on counseling and coordination of care as described above. Current Length of Stay: 1 day(s)    Current Patient Status: Inpatient   Certification Statement: The patient will continue to require additional inpatient hospital stay due to COVID Pneumonia    Discharge Plan: >2 days    Code Status: Level 1 - Full Code    Subjective:   Aletha Galindo 72DU M seen and examined at bedside AAOx3. Patient reports generalized malaise with worsening non-productive cough. Also complaints of persistent pain in L shoulder with limited ROM, requesting pain medications. Reports tolerating meals well with normal bowel/urinary habits. Will continue management with MILD COVID pathway. Objective     Objective:   Vitals:   Temp (24hrs), Av.2 °F (36.8 °C), Min:98 °F (36.7 °C), Max:98.6 °F (37 °C)    Temp:  [98 °F (36.7 °C)-98.6 °F (37 °C)] 98 °F (36.7 °C)  HR:  [83-98] 88  Resp:  [18-20] 18  BP: (120-159)/(59-76) 142/76  SpO2:  [90 %-100 %] 96 %  Body mass index is 33.82 kg/m². Input and Output Summary (last 24 hours):     No intake or output data in the 24 hours ending 23    Physical Exam:   Physical Exam  Constitutional:       General: He is not in acute distress. Appearance: He is ill-appearing. Eyes:      Conjunctiva/sclera: Conjunctivae normal.      Pupils: Pupils are equal, round, and reactive to light. Cardiovascular:      Rate and Rhythm: Normal rate and regular rhythm. Pulses: Normal pulses. Heart sounds: Normal heart sounds. Pulmonary:      Effort: Pulmonary effort is normal.      Breath sounds: Wheezing (R lower lung) present.    Abdominal: General: There is no distension. Palpations: Abdomen is soft. Tenderness: There is no abdominal tenderness. There is no right CVA tenderness or left CVA tenderness. Musculoskeletal:      Left shoulder: Tenderness present. No swelling, deformity or bony tenderness. Decreased range of motion. Decreased strength. Left hand: No swelling. Normal range of motion. Normal strength. Right lower leg: No edema. Left lower leg: No edema. Skin:     General: Skin is warm and dry. Capillary Refill: Capillary refill takes less than 2 seconds. Findings: No rash. Neurological:      General: No focal deficit present. Mental Status: He is alert and oriented to person, place, and time. Additional Data:     Labs:  Results from last 7 days   Lab Units 12/09/23  0537   WBC Thousand/uL 19.92*   HEMOGLOBIN g/dL 12.9   HEMATOCRIT % 37.1   PLATELETS Thousands/uL 175   NEUTROS PCT % 27*   LYMPHS PCT % 70*   MONOS PCT % 3*   EOS PCT % 0     Results from last 7 days   Lab Units 12/09/23  0537   POTASSIUM mmol/L 3.4*   CHLORIDE mmol/L 101   CO2 mmol/L 27   BUN mg/dL 13   CREATININE mg/dL 0.67   CALCIUM mg/dL 9.1   ALK PHOS U/L 43   ALT U/L 15   AST U/L 12*         Results from last 7 days   Lab Units 12/09/23  2050 12/09/23  1556 12/09/23  1115 12/09/23  0831 12/08/23  2049   POC GLUCOSE mg/dl 419* 324* 181* 107 388*           * I Have Reviewed All Lab Data Listed Above. * Additional Pertinent Lab Tests Reviewed:  [facility] Admission Reviewed    Imaging:    Imaging Reports Reviewed Today Include:   CT: Mixed GGO in RLL, mediastinal and right lymphadenopathy, large stool in rectum and colon suggesting impaction, mild diffuse thickening of urinary bladder possible cystitis or chronic outlet obstruction, prostamegaly, no cervical spine fracture or traumatic malalignment, no acute intracranial abnormality    CT head & cervical spine: no acute osseous abnormality    XR L shoulder: no acute osseous abnormality (pending official read)    Recent Cultures (last 7 days):     Results from last 7 days   Lab Units 12/08/23  0141 12/08/23  0138 12/08/23  0131   BLOOD CULTURE   --   --  No Growth at 24 hrs.    GRAM STAIN RESULT   --  Gram positive cocci in clusters*  --    URINE CULTURE  No Growth <1000 cfu/mL  --   --        Last 24 Hours Medication List:   Current Facility-Administered Medications   Medication Dose Route Frequency Provider Last Rate    acetaminophen  1,000 mg Intravenous Q6H PRN Gretchen Melchor MD      albuterol  2.5 mg Nebulization Q6H PRN Norman Valadez, DO      apixaban  5 mg Oral BID Norman Valadez, DO      vitamin C  1,000 mg Oral Daily Norman Valadez, DO      atorvastatin  10 mg Oral Daily Norman Valadez, DO      baricitinib  4 mg Oral Q24H Gretchen Melchor MD      benzonatate  100 mg Oral TID PRN Orlando Crowder, DO      busPIRone  10 mg Oral BID Norman Valadez, DO      cefTRIAXone  1,000 mg Intravenous Q24H Gretchen Melchor MD 1,000 mg (12/09/23 1749)    cholecalciferol  1,000 Units Oral Daily Norman Valadez, DO      dexamethasone  6 mg Intravenous Q24H Gretchen Melchor MD      digoxin  250 mcg Oral Daily Orlando Crowder, DO      docusate sodium  100 mg Oral BID Norman Valadez, DO      doxycycline  100 mg Intravenous Q12H Gretchen Melchor  mg (12/09/23 2113)    fish oil  1,000 mg Oral BID Norman Valadez, DO      fluticasone  1 spray Nasal Daily Norman Valadez, DO      Fluticasone Furoate-Vilanterol  1 puff Inhalation Daily Orlando Crowder, DO      gabapentin  800 mg Oral BID Sunilsjorge Valadez, DO      guaiFENesin  1,200 mg Oral Q12H [facility] Orlando Crowder, DO      insulin glargine  50 Units Subcutaneous Q12H [facility] Sunilsjorge Valadez, DO      insulin lispro  4-20 Units Subcutaneous 4x Daily (AC & HS) Jodine Freida Valadez, DO      ipratropium-albuterol  3 mL Nebulization Q6H Norman Valadez, DO      lamoTRIgine  25 mg Oral HS Norman Valadez, DO      lidocaine  1 patch Topical Daily Gretchen Melchor MD magnesium Oxide  400 mg Oral Daily [facility], DO      multivitamin-minerals  1 tablet Oral Daily [place], DO      pantoprazole  40 mg Oral Daily [facility], DO      potassium chloride  20 mEq Oral Q48H [facility], DO      QUEtiapine  300 mg Oral HS [place], DO      remdesivir  100 mg Intravenous Q24H [place], DO      sertraline  50 mg Oral Daily [facility], DO      sodium chloride  1 g Oral QAM [facility], DO      tiZANidine  4 mg Oral Q8H PRN [facility], DO      traMADol  50 mg Oral Q6H PRN [facility], DO      umeclidinium  1 puff Inhalation Daily Joce, DO               ** Please Note: Dictation voice to text software may have been used in the creation of this document.  Morena Whitmore MD  12/09/23  9:16 PM

## 2023-12-10 PROBLEM — A41.9 SEPTIC SHOCK (HCC): Status: RESOLVED | Noted: 2023-12-08 | Resolved: 2023-12-10

## 2023-12-10 PROBLEM — R65.21 SEPTIC SHOCK (HCC): Status: RESOLVED | Noted: 2023-12-08 | Resolved: 2023-12-10

## 2023-12-10 PROBLEM — J12.82 PNEUMONIA DUE TO COVID-19 VIRUS: Chronic | Status: ACTIVE | Noted: 2023-12-08

## 2023-12-10 PROBLEM — U07.1 PNEUMONIA DUE TO COVID-19 VIRUS: Chronic | Status: ACTIVE | Noted: 2023-12-08

## 2023-12-10 LAB
ALBUMIN SERPL BCP-MCNC: 3.8 G/DL (ref 3.5–5)
ALP SERPL-CCNC: 47 U/L (ref 34–104)
ALT SERPL W P-5'-P-CCNC: 22 U/L (ref 7–52)
ANION GAP SERPL CALCULATED.3IONS-SCNC: 7 MMOL/L
AST SERPL W P-5'-P-CCNC: 20 U/L (ref 13–39)
BASOPHILS # BLD AUTO: 0.03 THOUSANDS/ÂΜL (ref 0–0.1)
BILIRUB SERPL-MCNC: 0.47 MG/DL (ref 0.2–1)
BUN SERPL-MCNC: 13 MG/DL (ref 5–25)
CALCIUM SERPL-MCNC: 8.9 MG/DL (ref 8.4–10.2)
CHLORIDE SERPL-SCNC: 99 MMOL/L (ref 96–108)
CK SERPL-CCNC: 41 U/L (ref 39–308)
CO2 SERPL-SCNC: 28 MMOL/L (ref 21–32)
CREAT SERPL-MCNC: 0.66 MG/DL (ref 0.6–1.3)
CRP SERPL QL: 5.2 MG/L
EOSINOPHIL # BLD AUTO: 0.01 THOUSAND/ÂΜL (ref 0–0.61)
ERYTHROCYTE [DISTWIDTH] IN BLOOD BY AUTOMATED COUNT: 12.8 % (ref 11.6–15.1)
GFR SERPL CREATININE-BSD FRML MDRD: 102 ML/MIN/1.73SQ M
GLUCOSE SERPL-MCNC: 171 MG/DL (ref 65–140)
GLUCOSE SERPL-MCNC: 198 MG/DL (ref 65–140)
GLUCOSE SERPL-MCNC: 226 MG/DL (ref 65–140)
GLUCOSE SERPL-MCNC: 232 MG/DL (ref 65–140)
GLUCOSE SERPL-MCNC: 366 MG/DL (ref 65–140)
GLUCOSE SERPL-MCNC: 425 MG/DL (ref 65–140)
HCT VFR BLD AUTO: 35.8 % (ref 36.5–49.3)
HGB BLD-MCNC: 12.4 G/DL (ref 12–17)
IMM GRANULOCYTES # BLD AUTO: 0.07 THOUSAND/UL (ref 0–0.2)
LYMPHOCYTES # BLD AUTO: 11.64 THOUSANDS/ÂΜL (ref 0.6–4.47)
MCH RBC QN AUTO: 33 PG (ref 26.8–34.3)
MCHC RBC AUTO-ENTMCNC: 34.6 G/DL (ref 31.4–37.4)
MCV RBC AUTO: 95 FL (ref 82–98)
MONOCYTES # BLD AUTO: 0.38 THOUSAND/ÂΜL (ref 0.17–1.22)
NEUTROPHILS # BLD AUTO: 5.25 THOUSANDS/ÂΜL (ref 1.85–7.62)
NRBC BLD AUTO-RTO: 0 /100 WBCS
PLATELET # BLD AUTO: 169 THOUSANDS/UL (ref 149–390)
PMV BLD AUTO: 9.2 FL (ref 8.9–12.7)
POTASSIUM SERPL-SCNC: 4.3 MMOL/L (ref 3.5–5.3)
PROT SERPL-MCNC: 6.2 G/DL (ref 6.4–8.4)
RBC # BLD AUTO: 3.76 MILLION/UL (ref 3.88–5.62)
SODIUM SERPL-SCNC: 134 MMOL/L (ref 135–147)
WBC # BLD AUTO: 17.38 THOUSAND/UL (ref 4.31–10.16)

## 2023-12-10 PROCEDURE — 86140 C-REACTIVE PROTEIN: CPT

## 2023-12-10 PROCEDURE — 94640 AIRWAY INHALATION TREATMENT: CPT

## 2023-12-10 PROCEDURE — 94760 N-INVAS EAR/PLS OXIMETRY 1: CPT

## 2023-12-10 PROCEDURE — 82948 REAGENT STRIP/BLOOD GLUCOSE: CPT

## 2023-12-10 PROCEDURE — 94669 MECHANICAL CHEST WALL OSCILL: CPT

## 2023-12-10 PROCEDURE — 99232 SBSQ HOSP IP/OBS MODERATE 35: CPT | Performed by: STUDENT IN AN ORGANIZED HEALTH CARE EDUCATION/TRAINING PROGRAM

## 2023-12-10 PROCEDURE — 85025 COMPLETE CBC W/AUTO DIFF WBC: CPT | Performed by: STUDENT IN AN ORGANIZED HEALTH CARE EDUCATION/TRAINING PROGRAM

## 2023-12-10 PROCEDURE — 94660 CPAP INITIATION&MGMT: CPT

## 2023-12-10 PROCEDURE — 80053 COMPREHEN METABOLIC PANEL: CPT | Performed by: STUDENT IN AN ORGANIZED HEALTH CARE EDUCATION/TRAINING PROGRAM

## 2023-12-10 PROCEDURE — 82550 ASSAY OF CK (CPK): CPT

## 2023-12-10 RX ORDER — METOPROLOL SUCCINATE 100 MG/1
200 TABLET, EXTENDED RELEASE ORAL DAILY
Status: DISCONTINUED | OUTPATIENT
Start: 2023-12-10 | End: 2023-12-10

## 2023-12-10 RX ORDER — BUMETANIDE 1 MG/1
1 TABLET ORAL 2 TIMES DAILY
Status: DISCONTINUED | OUTPATIENT
Start: 2023-12-10 | End: 2023-12-11 | Stop reason: HOSPADM

## 2023-12-10 RX ORDER — METOPROLOL SUCCINATE 100 MG/1
200 TABLET, EXTENDED RELEASE ORAL EVERY 24 HOURS
Status: DISCONTINUED | OUTPATIENT
Start: 2023-12-10 | End: 2023-12-11 | Stop reason: HOSPADM

## 2023-12-10 RX ORDER — METOPROLOL SUCCINATE 100 MG/1
200 TABLET, EXTENDED RELEASE ORAL DAILY
Status: DISCONTINUED | OUTPATIENT
Start: 2023-12-11 | End: 2023-12-10

## 2023-12-10 RX ORDER — LOSARTAN POTASSIUM 50 MG/1
50 TABLET ORAL DAILY
Status: DISCONTINUED | OUTPATIENT
Start: 2023-12-10 | End: 2023-12-11 | Stop reason: HOSPADM

## 2023-12-10 RX ADMIN — DOXYCYCLINE 100 MG: 100 INJECTION, POWDER, LYOPHILIZED, FOR SOLUTION INTRAVENOUS at 21:40

## 2023-12-10 RX ADMIN — GABAPENTIN 800 MG: 400 CAPSULE ORAL at 17:13

## 2023-12-10 RX ADMIN — POTASSIUM CHLORIDE 20 MEQ: 1500 TABLET, EXTENDED RELEASE ORAL at 06:01

## 2023-12-10 RX ADMIN — OMEGA-3 FATTY ACIDS CAP 1000 MG 1000 MG: 1000 CAP at 10:02

## 2023-12-10 RX ADMIN — DIGOXIN 250 MCG: 125 TABLET ORAL at 10:01

## 2023-12-10 RX ADMIN — METOPROLOL SUCCINATE 200 MG: 100 TABLET, EXTENDED RELEASE ORAL at 21:41

## 2023-12-10 RX ADMIN — LIDOCAINE 5% 1 PATCH: 700 PATCH TOPICAL at 10:00

## 2023-12-10 RX ADMIN — IPRATROPIUM BROMIDE AND ALBUTEROL SULFATE 3 ML: 2.5; .5 SOLUTION RESPIRATORY (INHALATION) at 07:14

## 2023-12-10 RX ADMIN — FLUTICASONE FUROATE AND VILANTEROL TRIFENATATE 1 PUFF: 100; 25 POWDER RESPIRATORY (INHALATION) at 10:05

## 2023-12-10 RX ADMIN — IPRATROPIUM BROMIDE AND ALBUTEROL SULFATE 3 ML: 2.5; .5 SOLUTION RESPIRATORY (INHALATION) at 14:07

## 2023-12-10 RX ADMIN — TIZANIDINE 4 MG: 2 TABLET ORAL at 21:53

## 2023-12-10 RX ADMIN — Medication 400 MG: at 10:02

## 2023-12-10 RX ADMIN — LOSARTAN POTASSIUM 50 MG: 50 TABLET, FILM COATED ORAL at 12:29

## 2023-12-10 RX ADMIN — INSULIN LISPRO 8 UNITS: 100 INJECTION, SOLUTION INTRAVENOUS; SUBCUTANEOUS at 12:25

## 2023-12-10 RX ADMIN — GABAPENTIN 800 MG: 400 CAPSULE ORAL at 10:00

## 2023-12-10 RX ADMIN — BUSPIRONE HYDROCHLORIDE 10 MG: 10 TABLET ORAL at 10:00

## 2023-12-10 RX ADMIN — OMEGA-3 FATTY ACIDS CAP 1000 MG 1000 MG: 1000 CAP at 17:14

## 2023-12-10 RX ADMIN — TRAMADOL HYDROCHLORIDE 50 MG: 50 TABLET, COATED ORAL at 06:09

## 2023-12-10 RX ADMIN — TRAMADOL HYDROCHLORIDE 50 MG: 50 TABLET, COATED ORAL at 00:54

## 2023-12-10 RX ADMIN — DOXYCYCLINE 100 MG: 100 INJECTION, POWDER, LYOPHILIZED, FOR SOLUTION INTRAVENOUS at 10:19

## 2023-12-10 RX ADMIN — TRAMADOL HYDROCHLORIDE 50 MG: 50 TABLET, COATED ORAL at 13:41

## 2023-12-10 RX ADMIN — IPRATROPIUM BROMIDE AND ALBUTEROL SULFATE 3 ML: 2.5; .5 SOLUTION RESPIRATORY (INHALATION) at 20:40

## 2023-12-10 RX ADMIN — QUETIAPINE FUMARATE 300 MG: 100 TABLET ORAL at 21:41

## 2023-12-10 RX ADMIN — INSULIN LISPRO 4 UNITS: 100 INJECTION, SOLUTION INTRAVENOUS; SUBCUTANEOUS at 07:29

## 2023-12-10 RX ADMIN — Medication 1 TABLET: at 10:02

## 2023-12-10 RX ADMIN — FLUTICASONE PROPIONATE 1 SPRAY: 50 SPRAY, METERED NASAL at 10:05

## 2023-12-10 RX ADMIN — GUAIFENESIN 1200 MG: 600 TABLET, EXTENDED RELEASE ORAL at 21:40

## 2023-12-10 RX ADMIN — INSULIN LISPRO 8 UNITS: 100 INJECTION, SOLUTION INTRAVENOUS; SUBCUTANEOUS at 17:12

## 2023-12-10 RX ADMIN — APIXABAN 5 MG: 5 TABLET, FILM COATED ORAL at 17:14

## 2023-12-10 RX ADMIN — LAMOTRIGINE 25 MG: 25 TABLET ORAL at 21:41

## 2023-12-10 RX ADMIN — ATORVASTATIN CALCIUM 10 MG: 10 TABLET, FILM COATED ORAL at 10:00

## 2023-12-10 RX ADMIN — SODIUM CHLORIDE 1 G: 1 TABLET ORAL at 10:00

## 2023-12-10 RX ADMIN — INSULIN GLARGINE 50 UNITS: 100 INJECTION, SOLUTION SUBCUTANEOUS at 21:41

## 2023-12-10 RX ADMIN — CEFTRIAXONE 1000 MG: 1 INJECTION, SOLUTION INTRAVENOUS at 17:15

## 2023-12-10 RX ADMIN — GUAIFENESIN 1200 MG: 600 TABLET, EXTENDED RELEASE ORAL at 10:00

## 2023-12-10 RX ADMIN — PANTOPRAZOLE SODIUM 40 MG: 40 TABLET, DELAYED RELEASE ORAL at 10:01

## 2023-12-10 RX ADMIN — IPRATROPIUM BROMIDE AND ALBUTEROL SULFATE 3 ML: 2.5; .5 SOLUTION RESPIRATORY (INHALATION) at 01:31

## 2023-12-10 RX ADMIN — UMECLIDINIUM 1 PUFF: 62.5 AEROSOL, POWDER ORAL at 10:05

## 2023-12-10 RX ADMIN — BUSPIRONE HYDROCHLORIDE 10 MG: 10 TABLET ORAL at 17:14

## 2023-12-10 RX ADMIN — APIXABAN 5 MG: 5 TABLET, FILM COATED ORAL at 10:02

## 2023-12-10 RX ADMIN — TIZANIDINE 4 MG: 2 TABLET ORAL at 09:22

## 2023-12-10 RX ADMIN — Medication 1000 UNITS: at 10:02

## 2023-12-10 RX ADMIN — INSULIN GLARGINE 50 UNITS: 100 INJECTION, SOLUTION SUBCUTANEOUS at 10:03

## 2023-12-10 RX ADMIN — BARICITINIB 4 MG: 2 TABLET, FILM COATED ORAL at 17:16

## 2023-12-10 RX ADMIN — SERTRALINE HYDROCHLORIDE 50 MG: 50 TABLET ORAL at 10:02

## 2023-12-10 RX ADMIN — REMDESIVIR 100 MG: 100 INJECTION, POWDER, LYOPHILIZED, FOR SOLUTION INTRAVENOUS at 12:25

## 2023-12-10 RX ADMIN — OXYCODONE HYDROCHLORIDE AND ACETAMINOPHEN 1000 MG: 500 TABLET ORAL at 10:00

## 2023-12-10 RX ADMIN — DOCUSATE SODIUM 100 MG: 100 CAPSULE, LIQUID FILLED ORAL at 10:02

## 2023-12-10 RX ADMIN — TRAMADOL HYDROCHLORIDE 50 MG: 50 TABLET, COATED ORAL at 19:43

## 2023-12-10 RX ADMIN — INSULIN LISPRO 16 UNITS: 100 INJECTION, SOLUTION INTRAVENOUS; SUBCUTANEOUS at 21:45

## 2023-12-10 RX ADMIN — DOCUSATE SODIUM 100 MG: 100 CAPSULE, LIQUID FILLED ORAL at 17:14

## 2023-12-10 RX ADMIN — DEXAMETHASONE SODIUM PHOSPHATE 6 MG: 4 INJECTION INTRA-ARTICULAR; INTRALESIONAL; INTRAMUSCULAR; INTRAVENOUS; SOFT TISSUE at 17:14

## 2023-12-10 NOTE — PLAN OF CARE
Problem: PAIN - ADULT  Goal: Verbalizes/displays adequate comfort level or baseline comfort level  Description: Interventions:  - Encourage patient to monitor pain and request assistance  - Assess pain using appropriate pain scale  - Administer analgesics based on type and severity of pain and evaluate response  - Implement non-pharmacological measures as appropriate and evaluate response  - Consider cultural and social influences on pain and pain management  - Notify physician/advanced practitioner if interventions unsuccessful or patient reports new pain  Outcome: Progressing     Problem: INFECTION - ADULT  Goal: Absence or prevention of progression during hospitalization  Description: INTERVENTIONS:  - Assess and monitor for signs and symptoms of infection  - Monitor lab/diagnostic results  - Monitor all insertion sites, i.e. indwelling lines, tubes, and drains  Problem: RESPIRATORY - ADULT  Goal: Achieves optimal ventilation and oxygenation  Description: INTERVENTIONS:  - Assess for changes in respiratory status  - Assess for changes in mentation and behavior  - Position to facilitate oxygenation and minimize respiratory effort  - Oxygen administered by appropriate delivery if ordered  - Initiate smoking cessation education as indicated  - Encourage broncho-pulmonary hygiene including cough, deep breathe, Incentive Spirometry  - Assess the need for suctioning and aspirate as needed  - Assess and instruct to report SOB or any respiratory difficulty  - Respiratory Therapy support as indicated  Outcome: Progressing     Problem: METABOLIC, FLUID AND ELECTROLYTES - ADULT  Goal: Glucose maintained within target range  Description: INTERVENTIONS:  - Monitor Blood Glucose as ordered  - Assess for signs and symptoms of hyperglycemia and hypoglycemia  - Administer ordered medications to maintain glucose within target range  - Assess nutritional intake and initiate nutrition service referral as needed  Outcome: [address] appropriate cooling/warming therapies per order  - Administer medications as ordered  - Instruct and encourage patient and family to use good hand hygiene technique  - Identify and instruct in appropriate isolation precautions for identified infection/condition  Outcome: Progressing     Problem: SAFETY ADULT  Goal: Patient will remain free of falls  Description: INTERVENTIONS:  - Educate patient/family on patient safety including physical limitations  - Instruct patient to call for assistance with activity   - Consult OT/PT to assist with strengthening/mobility   - Keep Call bell within reach  - Keep bed low and locked with side rails adjusted as appropriate  - Keep care items and personal belongings within reach  - Initiate and maintain comfort rounds  - Make Fall Risk Sign visible to staff  - Offer Toileting every 2 Hours, in advance of need  - Initiate/Maintain bed alarm  - Obtain necessary fall risk management equipment: yes  - Apply yellow socks and bracelet for high fall risk patients  - Consider moving patient to room near nurses station  Outcome: Progressing  Goal: Maintain or return to baseline ADL function  Description: INTERVENTIONS:  -  Assess patient's ability to carry out ADLs; assess patient's baseline for ADL function and identify physical deficits which impact ability to perform ADLs (bathing, care of mouth/teeth, toileting, grooming, dressing, etc.)  - Assess/evaluate cause of self-care deficits   - Assess range of motion  - Assess patient's mobility; develop plan if impaired  - Assess patient's need for assistive devices and provide as appropriate  - Encourage maximum independence but intervene and supervise when necessary  - Involve family in performance of ADLs  - Assess for home care needs following discharge   - Consider OT consult to assist with ADL evaluation and planning for discharge  - Provide patient education as appropriate  Outcome: Progressing     Problem: RESPIRATORY - ADULT  Goal: Achieves optimal ventilation and oxygenation  Description: INTERVENTIONS:  - Assess for changes in respiratory status  - Assess for changes in mentation and behavior  - Position to facilitate oxygenation and minimize respiratory effort  - Oxygen administered by appropriate delivery if ordered  - Initiate smoking cessation education as indicated  - Encourage broncho-pulmonary hygiene including cough, deep breathe, Incentive Spirometry  - Assess the need for suctioning and aspirate as needed  - Assess and instruct to report SOB or any respiratory difficulty  - Respiratory Therapy support as indicated  Outcome: Progressing

## 2023-12-10 NOTE — UTILIZATION REVIEW
NOTIFICATION OF INPATIENT ADMISSION   AUTHORIZATION REQUEST   SERVICING FACILITY:   [address] [place] [address]  Tax ID: [id number]  NPI: [id number] ATTENDING PROVIDER:  Attending Name and NPI#: Verna Stover [place] [[id number]]  Address: [address] [place] [address]  Phone: [phone]   ADMISSION INFORMATION:  Place of Service: Inpatient [address]  Place of Service Code: 21  Inpatient Admission Date/Time: 12/8/23  5:01 AM  Discharge Date/Time: No discharge date for patient encounter. Admitting Diagnosis Code/Description:  Pneumonia [J18.9]  Hypotension [I95.9]  Shock circulatory ([address]) [R57.9]  COVID [U07.1]     UTILIZATION REVIEW CONTACT:  Jules Nuñez Utilization   Network Utilization Review Department  Phone: [phone]  Fax [phone]  Email: [email or web address]  Contact for approvals/pending authorizations, clinical reviews, and discharge. PHYSICIAN ADVISORY SERVICES:  Medical Necessity Denial & [email or web address] Review  Phone: [phone]  Fax: [phone]  Email: [email or web address]     DISCHARGE SUPPORT TEAM:  For Patients Discharge Needs & Updates  Phone: [phone] opt. 2 Fax: [phone]  Email: [email or web address]

## 2023-12-10 NOTE — PROGRESS NOTES
Daily Progress Note - [facility]  Family Medicine Residency  Elzbieta Vidales 58 y.o. male MRN: [id number]  Unit/Bed#: [address] [place] [address] Encounter: [id number]  Admitting Physician: Nasir King MD  PCP: MANAS Payne  Date of Admission:  12/8/2023 12:51 AM    Summary:     IVFs:    Diet: Diet [place]/CHO Controlled; Consistent Carbohydrate Diet Level 2 (5 carb servings/75 grams CHO/meal)  VTE:  Apixaban (Eliquis) . Mechanical prophylaxis in place. Patient Centered Rounds: I have performed bedside rounds with nursing staff today. Specialists/Other Care Team Provider: None    LOS: 2 day(s)  Status: Inpatient   Disposition: The patient will continue to require additional inpatient hospital stay due to Covid PNA  Code Status: Level 1 - Full Code      Assessment and Plan    * Pneumonia due to COVID-19 virus  Assessment & Plan  Covid positive with mixed GGO & consolidative change in right lower lobe on chest CT. On MILD COVID pathway due to baseline O2 requirements of 3L O2 via NC days and BIPAP nights. Higher risk as evidenced by CLL in remission, COPD, heart failure, baseline oxygen use     - EKG: atrial fibrillation (chronic), 2Hr Trop Delta -3 (12>9), BNP 71,   - CRP 1.5, LA down-trending, WBC down-trending      Continue empiric coverage with IV Rocephin 1g QD & IV Doxycycline Q12hrs  Trend CRP x3 days  Continue Eliquis 5mg PO BID  Dexamethasone 6mg IV daily for 10 days  Baricitinib 4mg PO QD for 14 days, d/c if clinically stable for discharge  Remdecivir 200mg IV day 1, then 100mg IV QD for 4 days  Continue Mucinex and Tessalon perles  OOB TID, encourage ambulation  High frequency chest wall oscillation  PT/OT    COPD with exacerbation (HCC)  Assessment & Plan  Acute on Chronic COPD with home 3L O2 in setting of COVID pneumonia.  At home on Trelegy, Flonase, Duonebs, & Albuterol PRN  Continue home meds with hospital equivalents  Incentive spirometry  O2 supplementation, titrate for spO2 88-92%  Rest of plan as per 'Pneumonia due to Covid'    [address]  Reports fall at home prior to admission with point tenderness in right shoulder with decreased ROM.  strength and distal sensory intact. No bruising or superficial injury noted on exam.  - CT head & cervical spine: no acute osseous abnormality  - XR L shoulder: no acute osseous abnormality (pending official read)  Starting Ofirmev and Tramadol PRN  Starting Lidocaine 5% patch  MRI for possible rotator cuff injury  PT/OT    Severe pain of left shoulder  Assessment & Plan  Chronic, worsened after fall. Patient with limited ROM (limited ABDuction, cannot make fist). Pain is severe. Xray negative for acute osseous abnormality  MRI shoulder ordered  Rest of a/p under 'Fall'    Chronic lymphocytic leukemia of B-cell type in remission [facility])  Assessment & Plan  Chronic, pt follows with Dr Nalini Galo, was told he doesn't need to be on treatment.       Chronic diastolic congestive heart failure (HCC)  Assessment & Plan  Wt Readings from Last 3 Encounters:   12/10/23 106 kg (234 lb 3.2 oz)   12/04/23 109 kg (241 lb)   10/25/23 104 kg (229 lb)   Chronic, with low normal systolic function and LV EF 55% base on 2022 Echo  - Examines euvolemic does not appear to be in exacerbation, held home Bumex and Metoprolol in setting of low BP & ANA LILIA 2/2 Covid Pneumonia    Continue digoxin  Restart metoprolol as appropriate    Hyperlipidemia  Assessment & Plan  Continue home Lipitor and formulary substitution for his omega-3    Chronic a-fib (HCC)  Assessment & Plan  Chronic, at home on Digoxin 0.25mcg PO daily & Metoprolol 200mg PO QD.  - EKG: Atrial fibrillation without RVR  Chads Vasc 3  Continue Eliquis  Continue Digoxin  Held Metoprolol on admission due to Soft BPs    Obstructive sleep apnea  Assessment & Plan  BiPAP at bedtime    Type 2 diabetes mellitus with complication, with long-term current use of insulin [facility])  Assessment & Plan  Lab Results Component Value Date    HGBA1C 9.8 (H) 10/19/2023       Recent Labs     12/09/23  1115 12/09/23  1556 12/09/23  2050 12/10/23  0713   POCGLU 181* 324* 419* 171*       Blood Sugar Average: Last 72 hrs:  (P) 110.3    Chronic complicated with peripheral neuropathy. Continue home Lantus 50 twice daily  Insulin sliding scale  Continue with a carb controlled diet  Continue home Gabapentin 800mg BID    Chronic respiratory failure (HCC)  Assessment & Plan  COPD and chronic hypoxic respiratory failure requiring 3 L of oxygen during the day and BiPAP with 3 L of oxygen at night. Currently on baseline oxygen requirements. High risk in setting of COVID pneumonia. Continues to be at baseline O2 requirements  Monitor spO2, continue supplemental oxygen  Continue Bipap HS  Respiratory protocol  Rest of plan as per COPD    GERD  Assessment & Plan  Continue home Protonix    Essential hypertension  Assessment & Plan  Presented with hypotension; metoprolol, bumex and losartan held on admission. Will restart as appropriate  BP Improved  Restart losartan  Continue to hold metoprolol and bumex for now    Coronary artery disease  Assessment & Plan  Chronic, stable. Rate controlled afib on EKG     Negative troponins  Holding home metoprolol in setting of soft BP on admission    Morbid obesity   Assessment & Plan  Consider nutrition consult when appropriate as after patient's status has improved    Psychiatric disorder  Assessment & Plan  Continue home BuSpar and sertraline    Septic shock (HCC)-resolved as of 12/10/2023  Assessment & Plan  Prior to arrival as evidenced by hypotension 60/41, leukocytosis 28.20, tachypnea RR 21, in setting of COVID-19. LA 2.4    ED: IV NS 2L bolus    CT C/A/P: Mixed groundglass and consolidative change in the right lower lobe. Mucus plugging of some distal branches in the right lower lobe. Mediastinal and right hilar lymphadenopathy. Large amount of stool in the rectum and sigmoid colon.  Mild diffuse wall thickening of the urinary bladder. Prostatomegaly. More likely COVID pneumonia r/o lobar/aspiration pneumonia, less likely Acute Colitis vs Acute cystitis   - COVID positive  - Bcx 1/2 neg, Bcx 2/2 gram+ likely contaminant  - UA & Ucx negative  - Pt reports normal bowel movements and no abdominal pain    Continue empiric coverage with IV Rocephin 1g QD & IV Doxycycline Q12hrs  Avoiding IVF due to risk of CHF exacerbation  Rest of A/P as per 'Pneumonia due to [place]' - Mild Pathway    ANA LILIA (acute kidney injury) (HCC)-resolved as of 2023  Assessment & Plan  Likely Prerenal, presented with CR of 1.31. Currently at baseline Cr. Limit nephrotoxins as possible hold home losartan etc.  S/p fluid bolus in ED          Subjective:   Patient seen and evaluated at bedside, he was calm and in no acute distress. He continues to report left shoulder pain without numbness or tingling. His left upper extremity strength is diminished as well as his range of motion, cannot perform simple daily activities like open a sauce packet, sometimes drops objects. States his pain worsened after he fell prior to admission. States that his breathing has improved but he is still coughing. Denies fever, chills, chest pain, N/V/D. Objective     Objective:   Vitals:   Temp (24hrs), Av.2 °F (36.8 °C), Min:98 °F (36.7 °C), Max:98.5 °F (36.9 °C)    Temp:  [98 °F (36.7 °C)-98.5 °F (36.9 °C)] 98.2 °F (36.8 °C)  HR:  [84-96] 86  Resp:  [17-20] 17  BP: (117-159)/(53-76) 140/72  SpO2:  [90 %-99 %] 99 %  Body mass index is 32.66 kg/m². Input and Output Summary (last 24 hours):     No intake or output data in the 24 hours ending 12/10/23 1126    Physical Exam:   Physical Exam  Vitals reviewed. Constitutional:       General: He is not in acute distress. Appearance: Normal appearance. Cardiovascular:      Rate and Rhythm: Normal rate and regular rhythm. Heart sounds: No murmur heard.   Pulmonary:      Effort: Pulmonary effort is normal. No respiratory distress. Breath sounds: Rhonchi (scattered ronchi) present. No wheezing. Abdominal:      General: Bowel sounds are normal. There is no distension. Palpations: Abdomen is soft. Musculoskeletal:      Left shoulder: Tenderness present. No swelling. Deformity: Left shoulder is lower compared to the right. Decreased range of motion. Right lower leg: Edema present. Left lower leg: Edema present. Comments: Trace edema b/l   Neurological:      Mental Status: He is alert. Additional Data:     Labs:  Results from last 7 days   Lab Units 12/10/23  0610 12/09/23  0537   WBC Thousand/uL 17.38* 19.92*   HEMOGLOBIN g/dL 12.4 12.9   HEMATOCRIT % 35.8* 37.1   PLATELETS Thousands/uL 169 175   NEUTROS PCT %  --  27*   LYMPHS PCT %  --  70*   MONOS PCT %  --  3*   EOS PCT %  --  0     Results from last 7 days   Lab Units 12/10/23  0610   POTASSIUM mmol/L 4.3   CHLORIDE mmol/L 99   CO2 mmol/L 28   BUN mg/dL 13   CREATININE mg/dL 0.66   CALCIUM mg/dL 8.9   ALK PHOS U/L 47   ALT U/L 22   AST U/L 20         Results from last 7 days   Lab Units 12/10/23  0713 12/09/23  2050 12/09/23  1556 12/09/23  1115 12/09/23  0831   POC GLUCOSE mg/dl 171* 419* 324* 181* 107           * I Have Reviewed All Lab Data Listed Above. * Additional Pertinent Lab Tests Reviewed: All Labs Within Last 24 Hours Reviewed    Imaging:    Imaging Reports Reviewed Today Include: Left shoulder xray  Imaging Personally Reviewed by Myself Includes:  left shoulder xray    Recent Cultures (last 7 days):     Results from last 7 days   Lab Units 12/08/23  0141 12/08/23  0138 12/08/23  0131   BLOOD CULTURE   --  Staphylococcus coagulase negative* No Growth at 48 hrs.    GRAM STAIN RESULT   --  Gram positive cocci in clusters*  --    URINE CULTURE  No Growth <1000 cfu/mL  --   --        Last 24 Hours Medication List:   Current Facility-Administered Medications   Medication Dose Route Frequency Provider Last Rate    acetaminophen  1,000 mg Intravenous Q6H PRN Yosi Ramos MD      albuterol  2.5 mg Nebulization Q6H PRN [facility] Allyson, DO      apixaban  5 mg Oral BID [facility], DO      vitamin C  1,000 mg Oral Daily [facility], DO      atorvastatin  10 mg Oral Daily [facility], DO      baricitinib  4 mg Oral Q24H Yosi Ramos MD      benzonatate  100 mg Oral TID PRN Rik Fuse, DO      busPIRone  10 mg Oral BID [facility], DO      cefTRIAXone  1,000 mg Intravenous Q24H Yosi Ramos MD 1,000 mg (12/09/23 1749)    cholecalciferol  1,000 Units Oral Daily [facility], DO      dexamethasone  6 mg Intravenous Q24H Yosi Ramos MD      digoxin  250 mcg Oral Daily Rik Fuse, DO      docusate sodium  100 mg Oral BID [facility], DO      doxycycline  100 mg Intravenous Q12H Yosi Ramos  mg (12/10/23 1019)    fish oil  1,000 mg Oral BID [facility], DO      fluticasone  1 spray Nasal Daily [facility], DO      Fluticasone Furoate-Vilanterol  1 puff Inhalation Daily Rik Fuse, DO      gabapentin  800 mg Oral BID [facility], DO      guaiFENesin  1,200 mg Oral Q12H [address] [facility], DO      insulin glargine  50 Units Subcutaneous Q12H [address] [facility], DO      insulin lispro  4-20 Units Subcutaneous 4x Daily (AC & HS) [facility], DO      ipratropium-albuterol  3 mL Nebulization Q6H [facility], DO      lamoTRIgine  25 mg Oral HS [facility], DO      lidocaine  1 patch Topical Daily Yosi Ramos MD      magnesium Oxide  400 mg Oral Daily Rik Nazanin, DO      multivitamin-minerals  1 tablet Oral Daily Rik Fuse, DO      pantoprazole  40 mg Oral Daily [facility], DO      potassium chloride  20 mEq Oral Q48H [facility], DO      QUEtiapine  300 mg Oral HS [facility], DO      remdesivir  100 mg Intravenous Q24H [place], DO      sertraline  50 mg Oral Daily [facility], DO      sodium chloride  1 g Oral QAM Rik Nazanin, DO tiZANidine  4 mg Oral Q8H PRN [place], DO      traMADol  50 mg Oral Q6H PRN [place], DO      umeclidinium  1 puff Inhalation Daily Arcelia Mcdaniel, DO              Patient Information Sharing: . Jennie Mcmahan does not wish inpatient team to notify their loved ones of their condition and current plan. Time Spent for Care: 45 minutes. More than 50% of total time spent on counseling and coordination of care as described above. ** Please Note: Dictation voice to text software may have been used in the creation of this document.  [address] Jez Borrego MD  12/10/23  11:26 AM

## 2023-12-10 NOTE — ASSESSMENT & PLAN NOTE
Chronic, worsened after fall. Patient with limited ROM (limited ABDuction, cannot make fist). Pain is severe.   Xray negative for acute osseous abnormality  MRI shoulder outpatient  Rest of a/p under 'Fall'

## 2023-12-11 ENCOUNTER — TELEPHONE (OUTPATIENT)
Dept: FAMILY MEDICINE CLINIC | Facility: CLINIC | Age: 63
End: 2023-12-11

## 2023-12-11 VITALS
TEMPERATURE: 98.2 F | OXYGEN SATURATION: 95 % | DIASTOLIC BLOOD PRESSURE: 81 MMHG | SYSTOLIC BLOOD PRESSURE: 178 MMHG | BODY MASS INDEX: 33.33 KG/M2 | WEIGHT: 238.1 LBS | HEART RATE: 72 BPM | RESPIRATION RATE: 20 BRPM | HEIGHT: 71 IN

## 2023-12-11 PROBLEM — U07.1 PNEUMONIA DUE TO COVID-19 VIRUS: Chronic | Status: RESOLVED | Noted: 2023-12-08 | Resolved: 2023-12-11

## 2023-12-11 PROBLEM — J12.82 PNEUMONIA DUE TO COVID-19 VIRUS: Chronic | Status: RESOLVED | Noted: 2023-12-08 | Resolved: 2023-12-11

## 2023-12-11 LAB
ALBUMIN SERPL BCP-MCNC: 3.7 G/DL (ref 3.5–5)
ALP SERPL-CCNC: 52 U/L (ref 34–104)
ALT SERPL W P-5'-P-CCNC: 31 U/L (ref 7–52)
ANION GAP SERPL CALCULATED.3IONS-SCNC: 10 MMOL/L
AST SERPL W P-5'-P-CCNC: 26 U/L (ref 13–39)
BACTERIA BLD CULT: ABNORMAL
BASOPHILS # BLD AUTO: 0.04 THOUSANDS/ÂΜL (ref 0–0.1)
BASOPHILS NFR BLD AUTO: 0 % (ref 0–1)
BILIRUB SERPL-MCNC: 0.39 MG/DL (ref 0.2–1)
BUN SERPL-MCNC: 18 MG/DL (ref 5–25)
CALCIUM SERPL-MCNC: 9.5 MG/DL (ref 8.4–10.2)
CHLORIDE SERPL-SCNC: 97 MMOL/L (ref 96–108)
CO2 SERPL-SCNC: 27 MMOL/L (ref 21–32)
CREAT SERPL-MCNC: 0.69 MG/DL (ref 0.6–1.3)
CRP SERPL QL: 2.4 MG/L
DME PARACHUTE DELIVERY DATE ACTUAL: NORMAL
DME PARACHUTE DELIVERY DATE REQUESTED: NORMAL
DME PARACHUTE DELIVERY NOTE: NORMAL
DME PARACHUTE ITEM DESCRIPTION: NORMAL
DME PARACHUTE ITEM DESCRIPTION: NORMAL
DME PARACHUTE ORDER STATUS: NORMAL
DME PARACHUTE SUPPLIER NAME: NORMAL
DME PARACHUTE SUPPLIER PHONE: NORMAL
EOSINOPHIL # BLD AUTO: 0.01 THOUSAND/ÂΜL (ref 0–0.61)
EOSINOPHIL NFR BLD AUTO: 0 % (ref 0–6)
ERYTHROCYTE [DISTWIDTH] IN BLOOD BY AUTOMATED COUNT: 12.7 % (ref 11.6–15.1)
GFR SERPL CREATININE-BSD FRML MDRD: 100 ML/MIN/1.73SQ M
GLUCOSE SERPL-MCNC: 211 MG/DL (ref 65–140)
GLUCOSE SERPL-MCNC: 222 MG/DL (ref 65–140)
GLUCOSE SERPL-MCNC: 232 MG/DL (ref 65–140)
GRAM STN SPEC: ABNORMAL
HCT VFR BLD AUTO: 36.4 % (ref 36.5–49.3)
HGB BLD-MCNC: 12.2 G/DL (ref 12–17)
IMM GRANULOCYTES # BLD AUTO: 0.13 THOUSAND/UL (ref 0–0.2)
IMM GRANULOCYTES NFR BLD AUTO: 1 % (ref 0–2)
LYMPHOCYTES # BLD AUTO: 12.4 THOUSANDS/ÂΜL (ref 0.6–4.47)
LYMPHOCYTES NFR BLD AUTO: 62 % (ref 14–44)
MCH RBC QN AUTO: 31.4 PG (ref 26.8–34.3)
MCHC RBC AUTO-ENTMCNC: 33.5 G/DL (ref 31.4–37.4)
MCV RBC AUTO: 94 FL (ref 82–98)
MECA+MECC ISLT/SPM QL: DETECTED
MONOCYTES # BLD AUTO: 0.45 THOUSAND/ÂΜL (ref 0.17–1.22)
MONOCYTES NFR BLD AUTO: 2 % (ref 4–12)
NEUTROPHILS # BLD AUTO: 6.89 THOUSANDS/ÂΜL (ref 1.85–7.62)
NEUTS SEG NFR BLD AUTO: 35 % (ref 43–75)
NRBC BLD AUTO-RTO: 0 /100 WBCS
PLATELET # BLD AUTO: 192 THOUSANDS/UL (ref 149–390)
PMV BLD AUTO: 10 FL (ref 8.9–12.7)
POTASSIUM SERPL-SCNC: 4.4 MMOL/L (ref 3.5–5.3)
PROT SERPL-MCNC: 6.1 G/DL (ref 6.4–8.4)
RBC # BLD AUTO: 3.88 MILLION/UL (ref 3.88–5.62)
S EPIDERMIDIS DNA BLD POS QL NAA+NON-PRB: DETECTED
SODIUM SERPL-SCNC: 134 MMOL/L (ref 135–147)
WBC # BLD AUTO: 19.92 THOUSAND/UL (ref 4.31–10.16)

## 2023-12-11 PROCEDURE — 94760 N-INVAS EAR/PLS OXIMETRY 1: CPT

## 2023-12-11 PROCEDURE — 94640 AIRWAY INHALATION TREATMENT: CPT

## 2023-12-11 PROCEDURE — 80053 COMPREHEN METABOLIC PANEL: CPT

## 2023-12-11 PROCEDURE — 85025 COMPLETE CBC W/AUTO DIFF WBC: CPT

## 2023-12-11 PROCEDURE — 99232 SBSQ HOSP IP/OBS MODERATE 35: CPT | Performed by: STUDENT IN AN ORGANIZED HEALTH CARE EDUCATION/TRAINING PROGRAM

## 2023-12-11 PROCEDURE — 86140 C-REACTIVE PROTEIN: CPT

## 2023-12-11 PROCEDURE — 94668 MNPJ CHEST WALL SBSQ: CPT

## 2023-12-11 PROCEDURE — 97530 THERAPEUTIC ACTIVITIES: CPT

## 2023-12-11 PROCEDURE — 82948 REAGENT STRIP/BLOOD GLUCOSE: CPT

## 2023-12-11 RX ORDER — GUAIFENESIN 600 MG/1
1200 TABLET, EXTENDED RELEASE ORAL EVERY 12 HOURS SCHEDULED
Qty: 56 TABLET | Refills: 0 | Status: SHIPPED | OUTPATIENT
Start: 2023-12-11 | End: 2023-12-25

## 2023-12-11 RX ORDER — TIZANIDINE 4 MG/1
4 TABLET ORAL EVERY 8 HOURS PRN
Qty: 21 TABLET | Refills: 0 | Status: SHIPPED | OUTPATIENT
Start: 2023-12-11 | End: 2023-12-18 | Stop reason: SDUPTHER

## 2023-12-11 RX ORDER — BENZONATATE 100 MG/1
100 CAPSULE ORAL 3 TIMES DAILY PRN
Qty: 20 CAPSULE | Refills: 0 | Status: SHIPPED | OUTPATIENT
Start: 2023-12-11

## 2023-12-11 RX ORDER — LIDOCAINE 40 MG/G
CREAM TOPICAL AS NEEDED
Qty: 28 G | Refills: 1 | Status: SHIPPED | OUTPATIENT
Start: 2023-12-11

## 2023-12-11 RX ORDER — IPRATROPIUM BROMIDE AND ALBUTEROL SULFATE 2.5; .5 MG/3ML; MG/3ML
3 SOLUTION RESPIRATORY (INHALATION) EVERY 6 HOURS PRN
Qty: 60 ML | Refills: 0 | Status: SHIPPED | OUTPATIENT
Start: 2023-12-11

## 2023-12-11 RX ADMIN — BUSPIRONE HYDROCHLORIDE 10 MG: 10 TABLET ORAL at 09:36

## 2023-12-11 RX ADMIN — INSULIN GLARGINE 50 UNITS: 100 INJECTION, SOLUTION SUBCUTANEOUS at 09:36

## 2023-12-11 RX ADMIN — SERTRALINE HYDROCHLORIDE 50 MG: 50 TABLET ORAL at 09:37

## 2023-12-11 RX ADMIN — DIGOXIN 250 MCG: 125 TABLET ORAL at 09:35

## 2023-12-11 RX ADMIN — LOSARTAN POTASSIUM 50 MG: 50 TABLET, FILM COATED ORAL at 09:37

## 2023-12-11 RX ADMIN — IPRATROPIUM BROMIDE AND ALBUTEROL SULFATE 3 ML: 2.5; .5 SOLUTION RESPIRATORY (INHALATION) at 07:00

## 2023-12-11 RX ADMIN — TRAMADOL HYDROCHLORIDE 50 MG: 50 TABLET, COATED ORAL at 09:36

## 2023-12-11 RX ADMIN — FLUTICASONE FUROATE AND VILANTEROL TRIFENATATE 1 PUFF: 100; 25 POWDER RESPIRATORY (INHALATION) at 09:40

## 2023-12-11 RX ADMIN — DOXYCYCLINE 100 MG: 100 INJECTION, POWDER, LYOPHILIZED, FOR SOLUTION INTRAVENOUS at 09:46

## 2023-12-11 RX ADMIN — GABAPENTIN 800 MG: 400 CAPSULE ORAL at 09:36

## 2023-12-11 RX ADMIN — INSULIN LISPRO 8 UNITS: 100 INJECTION, SOLUTION INTRAVENOUS; SUBCUTANEOUS at 12:14

## 2023-12-11 RX ADMIN — DOCUSATE SODIUM 100 MG: 100 CAPSULE, LIQUID FILLED ORAL at 09:35

## 2023-12-11 RX ADMIN — IPRATROPIUM BROMIDE AND ALBUTEROL SULFATE 3 ML: 2.5; .5 SOLUTION RESPIRATORY (INHALATION) at 01:11

## 2023-12-11 RX ADMIN — PANTOPRAZOLE SODIUM 40 MG: 40 TABLET, DELAYED RELEASE ORAL at 09:36

## 2023-12-11 RX ADMIN — IPRATROPIUM BROMIDE AND ALBUTEROL SULFATE 3 ML: 2.5; .5 SOLUTION RESPIRATORY (INHALATION) at 15:31

## 2023-12-11 RX ADMIN — GUAIFENESIN 1200 MG: 600 TABLET, EXTENDED RELEASE ORAL at 09:37

## 2023-12-11 RX ADMIN — Medication 1000 UNITS: at 09:36

## 2023-12-11 RX ADMIN — LIDOCAINE 5% 1 PATCH: 700 PATCH TOPICAL at 09:37

## 2023-12-11 RX ADMIN — UMECLIDINIUM 1 PUFF: 62.5 AEROSOL, POWDER ORAL at 09:40

## 2023-12-11 RX ADMIN — SODIUM CHLORIDE 1 G: 1 TABLET ORAL at 09:36

## 2023-12-11 RX ADMIN — INSULIN LISPRO 8 UNITS: 100 INJECTION, SOLUTION INTRAVENOUS; SUBCUTANEOUS at 07:28

## 2023-12-11 RX ADMIN — OMEGA-3 FATTY ACIDS CAP 1000 MG 1000 MG: 1000 CAP at 09:37

## 2023-12-11 RX ADMIN — Medication 1 TABLET: at 09:36

## 2023-12-11 RX ADMIN — BUMETANIDE 1 MG: 1 TABLET ORAL at 09:37

## 2023-12-11 RX ADMIN — APIXABAN 5 MG: 5 TABLET, FILM COATED ORAL at 09:37

## 2023-12-11 RX ADMIN — REMDESIVIR 100 MG: 100 INJECTION, POWDER, LYOPHILIZED, FOR SOLUTION INTRAVENOUS at 12:15

## 2023-12-11 RX ADMIN — FLUTICASONE PROPIONATE 1 SPRAY: 50 SPRAY, METERED NASAL at 09:40

## 2023-12-11 RX ADMIN — OXYCODONE HYDROCHLORIDE AND ACETAMINOPHEN 1000 MG: 500 TABLET ORAL at 09:36

## 2023-12-11 RX ADMIN — Medication 400 MG: at 09:37

## 2023-12-11 RX ADMIN — ATORVASTATIN CALCIUM 10 MG: 10 TABLET, FILM COATED ORAL at 09:36

## 2023-12-11 NOTE — PHYSICAL THERAPY NOTE
PT TREATMENT     12/11/23 1101   PT Last Visit   PT Visit Date 12/11/23   Note Type   Note Type Treatment   Pain Assessment   Pain Assessment Tool 0-10   Pain Score 9   Pain Location/Orientation Orientation: Left; Location: Shoulder   Restrictions/Precautions   Other Precautions O2;Fall Risk;Pain; Airborne/isolation   General   Chart Reviewed Yes   Cognition   Overall Cognitive Status WFL   Subjective   Subjective "This has been a rough admission"  "I have to waitg 45 minutes for someone to anwer my call bell"   Bed Mobility   Supine to Sit 5  Supervision   Sit to Supine 5  Supervision   Transfers   Sit to Stand 5  Supervision   Stand to Sit 5  Supervision   Stand pivot 5  Supervision   Ambulation/Elevation   Gait pattern   (slow christina)   Gait Assistance 5  Supervision   Assistive Device   (IV pole for support, 3L02)   Distance 2x10 feet, 3x 20 feet in room due to airborne precautions   Balance   Static Sitting Good   Dynamic Sitting Fair +   Static Standing Fair +   Dynamic Standing Fair   Ambulatory Fair +   Activity Tolerance   Activity Tolerance Patient tolerated treatment well;Patient limited by pain; Patient limited by fatigue   Assessment   Prognosis Good   Problem List Decreased strength;Decreased endurance; Impaired balance;Decreased mobility;Pain;Obesity   Assessment Patient demonstrates improving activity tolerance overall status post admission for COVID-19. Patient was able to ambulate in his room a total of about 80 feet with intermittent rest breaks with supervision assist using the IV pole as his assistive device. Patient was unable to use rolling walker due to complaint of left shoulder pain. Patient was inquiring about when left shoulder MRI will be completed however upon checking for the patient test is not scheduled yet. Patient had many complaints about his care in the hospital.  Patient did note that if he had a recliner he would feel better.   This therapist found patient in recliner and set up in the room for the patient to use. PT session ended with patient reclining in his recliner with all needs in reach. The patient's AM-[facility] Basic Mobility Inpatient Short Form Raw Score is 19. A Raw score of greater than 16 suggests the patient may benefit from discharge to home. Plan   Treatment/Interventions LE strengthening/ROM; Functional transfer training;ADL retraining; Endurance training; Therapeutic exercise;Elevations;Gait training;Equipment eval/education;Patient/family training   Progress Progressing toward goals   PT Frequency Other (Comment)  (5x/week)   Discharge Recommendation   Rehab Resource Intensity Level, PT III (Minimum Resource Intensity)   AM-PAC Basic Mobility Inpatient   Turning in Flat Bed Without Bedrails 4   Lying on Back to Sitting on Edge of Flat Bed Without Bedrails 4   Moving Bed to Chair 4   Standing Up From Chair Using Arms 4   Walk in Room 3   Climb 3-5 Stairs With Railing 3   Basic Mobility Inpatient Raw Score 22   Basic Mobility Standardized Score 47.4   Highest Level Of Mobility   JH-HLM Goal 7: Walk 25 feet or more   JH-HLM Achieved 7: Walk 25 feet or more   Education   Education Provided Assistive device; Mobility training   End of Consult   Patient Position at End of Consult All needs within reach; Bedside chair   Licensure   [address] License Number  [facility] [id number]

## 2023-12-11 NOTE — CASE MANAGEMENT
Case Management Progress Note    Patient name Vaibhav Espitia  Location [facility] MRN [id number]  : 1960 Date 2023       LOS (days): 3  Geometric Mean LOS (GMLOS) (days): 5.10  Days to GMLOS:1.6        OBJECTIVE:      Current admission status: Inpatient  Preferred Pharmacy:   [address] [place] [address]  Phone: [phone] Fax: [phone]    Primary Care Provider: MANAS Payne    Primary Insurance: [address] [facility]  Secondary Insurance:     PROGRESS NOTE:    Portable O2 tank delivered to patient's room and signed delivery ticket uploaded to [employer]. Original ticket will be placed in [employer] liaison's inbox. Patient has a 1630 WCV pickup scheduled with Derick.

## 2023-12-11 NOTE — CASE MANAGEMENT
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    Unspecified lack of coordination     Other symbolic dysfunctions     Other abnormalities of gait and mobility 10/25/2023    Need for assistance with personal care 10/25/2023    Severe pain of left shoulder 2023    Severe pain 2023    Morbid (severe) obesity with alveolar hypoventilation ([address]) 2023    Acute sensory neuropathy 2023    Allergies 2023    Hepatosplenomegaly 2023    Ambulatory dysfunction 2023    Immunodeficiency, unspecified ([address]) 2022    Hypo-osmolality and hyponatremia 2022    Low back pain, unspecified 2022    Chronic lymphocytic leukemia of B-cell type in remission ([address]) 2022    Chronic obstructive pulmonary disease, unspecified ([address]) 2022    Chronic pain syndrome 2022    Foraminal stenosis of lumbar region 2022    Lumbar post-laminectomy syndrome 2022    Lumbar radiculopathy 2022    Shortness of breath 2022    Dysuria 2021    Other intervertebral disc degeneration, lumbar region 2021    Bipolar disorder ([address]) 2021    Hypertensive heart and chronic kidney disease with heart failure and stage 1 through stage 4 chronic kidney disease, or unspecified chronic kidney disease ([address]) 2021    Paroxysmal atrial fibrillation ([address]) 2021    Chronic respiratory failure with hypoxia ([address]) 2021    Chronic kidney disease, stage 2 (mild) 2021    Atherosclerotic heart disease of native coronary artery without angina pectoris 2021    Peripheral neuropathy 2021    Muscle weakness (generalized) 09/21/2021    Platelets decreased ([address]) 08/06/2021    Chronic diastolic congestive heart failure ([address]) 11/17/2020    Hyperlipidemia 10/29/2020    Nutritional anemia, unspecified  10/29/2020    Chest pain 10/11/2020    Simple chronic bronchitis ([address]) 10/11/2020    Hyperglycemia 05/01/2020    Transition of care performed with sharing of clinical summary 04/11/2020    Obstructive sleep apnea 02/02/2020    Chronic a-fib ([address]) 02/02/2020    H/O vitamin D deficiency 10/28/2019    Type 2 diabetes mellitus with complication, with long-term current use of insulin ([address]) 06/21/2019    Restrictive ventilatory defect 03/26/2019    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 ([address]) 03/25/2019    Lung disease, restrictive 11/21/2018    Chronic respiratory failure ([address]) 10/31/2018    Coronary artery disease 09/06/2017    Esophageal reflux 09/06/2017    Chronic low back pain 11/30/2016    SHAVONNE and COPD overlap syndrome      Morbid obesity      Uncontrolled diabetes mellitus with hyperglycemia ([address]) 09/02/2016    Fatigue 09/02/2016    GERD 06/08/2016    Psychiatric disorder     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/25/2015    Erectile dysfunction of organic origin 08/25/2014    Essential hypertension 09/04/2012    Diabetic neuropathy ([address]) 09/04/2012    Panic disorder without agoraphobia 09/04/2012    Vitamin D deficiency 09/04/2012      LOS (days): 3  Geometric Mean LOS (GMLOS) (days): 5.10  Days to GMLOS:1.8     OBJECTIVE:    Risk of Unplanned Readmission Score: 39.98       Current admission status: Inpatient  Referral Reason: Other (Discharge planning)    Preferred Pharmacy:   [address]  [address] [place] [address] Road [facility]  Phone: [phone] Fax: [phone]    Primary Care Provider: MANAS Payne    Primary Insurance: [facility]  Secondary Insurance:     ASSESSMENT:  [place] Proxies    There are no active Health Care Proxies on file. Readmission Root Cause  30 Day Readmission: No    Patient Information  Admitted from[de-identified] Home  Mental Status: Alert  During Assessment patient was accompanied by: Not accompanied during assessment  Assessment information provided by[de-identified] Patient  Primary Caregiver: Self  Support Systems: Home care staff, Family members, Alberto S [place] of Residence: [address] [place] [address] do you live in?: [facility] entry access options. Select all that apply.: Elevator  Type of Current Residence: Apartment  Floor Level: 2  Upon entering residence, is there a bedroom on the main floor (no further steps)?: Yes  Upon entering residence, is there a bathroom on the main floor (no further steps)?: Yes  Living Arrangements: Lives Alone    Activities of Daily Living Prior to Admission  Functional Status: Assistance  Completes ADLs independently?: No  Level of ADL dependence: Assistance  Ambulates independently?: Yes  Does patient use assisted devices?: Yes  Assisted Devices (DME) used: BiPAP, Home Oxygen concentrator, Portable Oxygen concentrator, Portable Oxygen tanks, Walker, Rollator, Other (Comment) (electric scooter)  DME Company Name (respiratory supplies):  [employer]  O2 Rate(s): 3L  Does patient currently own DME?: Yes  What DME does the patient currently own?: BiPAP, Home Oxygen concentrator, Portable Oxygen concentrator, Portable Oxygen tanks, Rollator, Walker, Other (Comment) (electric scooter)  Does the patient have a history of Short-Term Rehab?: Yes ([facility])  Does patient have a history of HHC?: Yes  Does patient currently have [facility]?: Yes    Current Home Health Care  Type of Current Home Care Services: Home PT, Home OT (Pt also has [facility] services 5 days per week)  Current [address][de-identified] [facility] Provider[de-identified] PCP    Patient Information Continued  Income Source: SSI/SSD  Does patient have prescription coverage?: Yes  Does patient receive dialysis treatments?: No  Does patient have a history of substance abuse?: No  Does patient have a history of Mental Health Diagnosis?: Yes  Is patient receiving treatment for mental health?: Yes    Means of Transportation  Means of Transport to [facility][de-identified] [place] DEBRA [place] Stability: Low Risk  (12/11/2023)    Housing Stability Vital Sign     Unable to Pay for Housing in the Last Year: No     Number of Places Lived in the Last Year: 1     Unstable Housing in the Last Year: No   Food Insecurity: No Food Insecurity (12/11/2023)    Hunger Vital Sign     Worried About Running Out of Food in the Last Year: Never true     Ran Out of Food in the Last Year: Never true   Transportation Needs: No Transportation Needs (12/11/2023)    PRAPARE - Transportation     Lack of Transportation (Medical): No     Lack of Transportation (Non-Medical): No   Utilities: Not At Risk (12/11/2023)    [facility] Utilities     Threatened with loss of utilities: No       DISCHARGE DETAILS:    Discharge planning discussed with[de-identified] Patient (by phone)  Freedom of Choice: Yes    Comments - Freedom of Choice: SW spoke with patient by phone to introduce role of CM, conduct assessment and discuss discharge planning. Patient lives alone and has support from the [address] [place] [address] I program 5 days per week. He relies on [employer] for transportation. His preference is to return home at discharge and to resume care from [facility]. [facility] placed a referral to [address] in [address] [place] [address]. Patient will need O2 tank for discharge and [facility] was [facility] in [place] [facility]. [employer] will dispense tank prior to discharge. [employer].       CM contacted family/caregiver?: Yes (Voice mail left for friend Azeb Bauer)  Were Treatment Team discharge recommendations reviewed with patient/caregiver?: Yes  Did patient/caregiver verbalize understanding of patient care needs?: Yes  Were patient/caregiver advised of the risks associated with not following Treatment Team discharge recommendations?: Yes    Contacts  Patient Contacts: Song Duenas (friend/POA)  Relationship to Patient[de-identified] Friend  Contact Method: Phone  Phone Number: [phone]  Reason/Outcome: Emergency Contact, Discharge [address]         Is the patient interested in [address] [facility] at discharge?: Yes  [facility] requested[de-identified] Nursing, Occupational Therapy, Physical [address] Name[de-identified] [facility] External Referral Reason (only applicable if external A name selected): Services not provided in network or near patient location  [facility] Provider[de-identified] PCP  Home Health Services Needed[de-identified] COPD Management, Evaluate Functional Status and Safety, Gait/ADL Training, Heart Failure Management, Oxygen Via Nasal Cannula, Strengthening/Theraputic Exercises to Improve Function  Oxygen LPM Ordered (if applicable based on home health services needed):: 3 LPM  Homebound Criteria Met[de-identified] Requires the Assistance of Another Person for Safe Ambulation or to Leave the Home, Uses an Assist Device (i.e. cane, walker, etc), Requires Medical Transportation  Supporting Clincal Findings[de-identified] Fatigues Easliy in [employer], Limited Endurance, Requires Oxygen    DME Referral Provided  Referral made for DME?: Yes  DME referral completed for the following items[de-identified] Portable Oxygen tanks  DME Supplier Name[de-identified] [employer]    Other Referral/Resources/Interventions Provided:  Interventions: DME, HHC, Transportation  Programs[de-identified] CHF, COPD    Would you like to participate in our [address] service program?  : No - Declined    Treatment Team Recommendation: Home with [facility]  Discharge Destination Plan[de-identified] Home with [address] [facility] [address] at Discharge : Wheelchair van  Dispatcher Contacted: Yes  Number/Name of Dispatcher: [facility] via [facility] [address] by Karlos and Unit #): TBD  ETA of Transport (Date): 12/11/23  ETA of Transport (Time): 1500 (requested, pickup time pending)         IMM Given (Date):: 12/11/23  IMM Given to[de-identified] Patient (IMM reviewed by phone with patient and he verbalized acknowledgement.   Copy given to patient via nursing and copy placed in scan bin for chart.)

## 2023-12-11 NOTE — NURSING NOTE
Patient left unit to home via wheelchair with transportation company. Patient AAOX4 No signs of distress noted, PIV removed and belongings returned. Discharge instructions given to patient who verbalized understanding.

## 2023-12-11 NOTE — DISCHARGE INSTR - AVS FIRST PAGE
Please call to schedule your shoulder MRI, the number is [phone]  Please remain under quarantine until 12/12/2023. You may go outside using a mask for 5 more days until 12/18/2023.   Follow-up with physical therapy for your shoulder

## 2023-12-11 NOTE — PLAN OF CARE
Problem: PAIN - ADULT  Goal: Verbalizes/displays adequate comfort level or baseline comfort level  Description: Interventions:  - Encourage patient to monitor pain and request assistance  - Assess pain using appropriate pain scale  - Administer analgesics based on type and severity of pain and evaluate response  - Implement non-pharmacological measures as appropriate and evaluate response  - Consider cultural and social influences on pain and pain management  - Notify physician/advanced practitioner if interventions unsuccessful or patient reports new pain  Outcome: Progressing     Problem: INFECTION - ADULT  Goal: Absence or prevention of progression during hospitalization  Description: INTERVENTIONS:  - Assess and monitor for signs and symptoms of infection  - Monitor lab/diagnostic results  - Monitor all insertion sites, i.e. indwelling lines, tubes, and drains  - Monitor endotracheal if appropriate and nasal secretions for changes in amount and color  - [place] appropriate cooling/warming therapies per order  - Administer medications as ordered  - Instruct and encourage patient and family to use good hand hygiene technique  - Identify and instruct in appropriate isolation precautions for identified infection/condition  Outcome: Progressing     Problem: SAFETY ADULT  Goal: Patient will remain free of falls  Description: INTERVENTIONS:  - Educate patient/family on patient safety including physical limitations  - Instruct patient to call for assistance with activity   - Consult OT/PT to assist with strengthening/mobility   - Keep Call bell within reach  - Keep bed low and locked with side rails adjusted as appropriate  - Keep care items and personal belongings within reach  - Initiate and maintain comfort rounds  - Make Fall Risk Sign visible to staff  - Offer Toileting every 2 Hours, in advance of need  - Initiate/Maintain bed alarm  - Obtain necessary fall risk management equipment: yes  - Apply yellow socks and bracelet for high fall risk patients  - Consider moving patient to room near nurses station  Outcome: Progressing  Goal: Maintain or return to baseline ADL function  Description: INTERVENTIONS:  -  Assess patient's ability to carry out ADLs; assess patient's baseline for ADL function and identify physical deficits which impact ability to perform ADLs (bathing, care of mouth/teeth, toileting, grooming, dressing, etc.)  - Assess/evaluate cause of self-care deficits   - Assess range of motion  - Assess patient's mobility; develop plan if impaired  - Assess patient's need for assistive devices and provide as appropriate  - Encourage maximum independence but intervene and supervise when necessary  - Involve family in performance of ADLs  - Assess for home care needs following discharge   - Consider OT consult to assist with ADL evaluation and planning for discharge  - Provide patient education as appropriate  Outcome: Progressing     Problem: RESPIRATORY - ADULT  Goal: Achieves optimal ventilation and oxygenation  Description: INTERVENTIONS:  - Assess for changes in respiratory status  - Assess for changes in mentation and behavior  - Position to facilitate oxygenation and minimize respiratory effort  - Oxygen administered by appropriate delivery if ordered  - Initiate smoking cessation education as indicated  - Encourage broncho-pulmonary hygiene including cough, deep breathe, Incentive Spirometry  - Assess the need for suctioning and aspirate as needed  - Assess and instruct to report SOB or any respiratory difficulty  - Respiratory Therapy support as indicated  Outcome: Progressing     Problem: METABOLIC, FLUID AND ELECTROLYTES - ADULT  Goal: Glucose maintained within target range  Description: INTERVENTIONS:  - Monitor Blood Glucose as ordered  - Assess for signs and symptoms of hyperglycemia and hypoglycemia  - Administer ordered medications to maintain glucose within target range  - Assess nutritional intake and initiate nutrition service referral as needed  Outcome: Progressing

## 2023-12-11 NOTE — QUICK NOTE
Notified by patients nurse that BP was 187/86 w/ , night team asked the nurse to repeat BP and it was 189/90 in L arm and 206/97 on R arm. Patient also told the nurse that he felt his heart beating fast and wanted the nurse to call his emergency contact, SHANI and made her aware. I personally evaluated the patient, patient was upset about the hospital administration about food coming in flimsy trays and him unable to charge his phones. However, he expressed gratitude for care provided by the medical team. Patient also asked for clarification about his hospital course. Explained to the patient his hospital course and answered specific questions about HTN and CHF. Explained that his BP medications were held initially due to hypotension and ANA LILIA at the time of presentation. Restarted patient's BP home med metoprolol tonight. Patient also appeared volume overloaded with 3+ BL LE putting edema up to ankles. Restarted home med bumex tonight as well as ANA LILIA is resolved and placed him on fluid restriction. Patient had episodes of tacycardia w. HR upto 139 - however patient stated it was after exerting himself when he walked to the bathroom, patient has a history of a fib and is asking to be monitored. Placed the patient on telemetry. Called patient's Glorianne Speaks at the request of the patient, however reached , left a message. To be cosigned by Dr. Margaret Salazar.      MD ARIAN BelloW  PGY-1

## 2023-12-11 NOTE — PROGRESS NOTES
Patient:  Cailin Delong    MRN:  [id number]    Aidin Request ID:  [id number]    Level of care reserved:  [place]    Partner Reserved:  Padmini Cline, [address] [place] [address] [phone]    Clinical needs requested:    Geography searched:  10 miles around [id number]    Start of Service:    Request sent:  12:07pm EST on 12/11/2023 by Sherman Martinez    Partner reserved:  12:26pm EST on 12/11/2023 by Sherman Martinez    Choice list shared:  12:26pm EST on 12/11/2023 by Sherman Martinez

## 2023-12-11 NOTE — PROGRESS NOTES
Discharge Summary - [address] [place] [address] Medicine Residency   Patient Information: Jenny Garcia 58 y.o. male MRN: [id number]  Unit/Bed#: [address] [place] [address] Encounter: [id number]  Admitting Physician: Jose Roe MD  Discharging Physician/Practitioner: Jose Roe MD   PCP: MANAS Fonseca    Admission Date:   Admission Orders (From admission, onward)       Ordered        12/08/23 0501  INPATIENT ADMISSION  Once                          Discharge Date: 12/11/23      Reason for Admission:  Pneumonia [J18.9]  Hypotension [I95.9]  Shock circulatory ([address]) [R57.9]  COVID [U07.1]     Discharge Diagnoses:      Principal Problem (Resolved):    Pneumonia due to COVID-19 virus  Active Problems:    Psychiatric disorder    Morbid obesity     Coronary artery disease    Essential hypertension    GERD    Chronic respiratory failure (HCC)    Type 2 diabetes mellitus with complication, with long-term current use of insulin (HCC)    Obstructive sleep apnea    Chronic a-fib (HCC)    Hyperlipidemia    Chronic diastolic congestive heart failure (HCC)    Chronic lymphocytic leukemia of B-cell type in remission ([address])    Severe pain of left shoulder    Fall  Resolved Problems:    COPD with exacerbation ([address])    ANA LILIA (acute kidney injury) ([address])    Septic shock ([address])       Consultations During Hospital Stay:  ·       None     Procedures Performed:   ·       None     Significant Findings / Test Results:   12/11: 1/2 Bcx + s.  Epidermidis, CRP WNL  12/10: Na 134, WBC 17.38, CRP 5.2, CK WNL  12/9: WBC 19.92, Bcx 1/2 neg, Bcx 2/2 gram+ likely contaminant  12/8: WBC 25.38  12/7: , , CR 1.31, LA 2.4, 2Hr Trop Delta -3 (12>9), WBC 28.20, COVID +, UA neg    EKG: Atrial fibrillation without RVR    CT: Mixed GGO in RLL, mediastinal and right lymphadenopathy, large stool in rectum and colon suggesting impaction, mild diffuse thickening of urinary bladder possible cystitis or chronic outlet obstruction, prostamegaly, no cervical spine fracture or traumatic malalignment, no acute intracranial abnormality    CT head & cervical spine: no acute osseous abnormality    XR L shoulder: no acute osseous abnormality (pending official read)       Incidental Findings:   ·       None      Test Results Pending at Discharge (will require follow up): ·       None     Outpatient Tests Requested:  ·       Left Shoulder MRI     Outpatient follow-up Requested:  ·       None    Complications:  None    Things to address at first visit after hospitalization   Has your breathing improved? Did you schedule your MRI of the left shoulder? Is your left shoulder pain better? Have you been working with physical therapy? Patient with high insulin requirements, uncontrolled diabetes with A1c of 9.8. On Lantus 50 twice daily. Consider modifying insulin regimen, may need mealtime insulin. Hospital Course:      Lucio España is a 58 y.o. male with past medical history of COPD on 3L oxygen at home, chronic A-fib, ambulatory dysfunction, CLL not on any chemotherapy, hyponatremia, diabetes presented to the hospital on 12/8/2023 due to weakness and general malaise. Patient stood up from his chair and felt weak, fell down. He did not hit his head, he did not lose consciousness. On arrival to the ER he was found to be positive for COVID. He also has history of back and shoulder pain, states his shoulder feels worse after his fall. CT revealing groundglass opacities and consolidative change, patient started on COVID pathway received remdesivir, baricitinib, dexamethasone. Was treated empirically with IV Rocephin and IV Doxy. He also received symptomatic treatment and airway/respiratory protocol. He had 1/2 blood cultures positive for staph epi which is likely contaminant. He was much improved on day of discharge, ranging from room air to 3 L O2 which is his baseline. Shoulder pain treated with Tylenol, tramadol, lidocaine patch and PT/OT evaluation. Recommend MRI of left shoulder outpatient follow-up with physical therapy and patient discharged home with home health services. See below for more details      * Pneumonia due to COVID-19 virus-resolved as of 12/11/2023  Assessment & Plan  Covid positive with mixed GGO & consolidative change in right lower lobe on chest CT. On MILD COVID pathway due to baseline O2 requirements of 3L O2 via NC days and BIPAP nights. Higher risk as evidenced by CLL in remission, COPD, heart failure, baseline oxygen use     - EKG: atrial fibrillation (chronic), 2Hr Trop Delta -3 (12>9), BNP 71,   - CRP 1.5, LA down-trending, WBC down-trending      Empiric coverage with IV Rocephin 1g QD & IV Doxycycline Q12hrs, received total 3 days Abx treatment. Trend CRP x3 days, downtrending on DC  Continue Eliquis 5mg PO BID  Dexamethasone 6mg IV daily  Baricitinib 4mg PO QD  Remdecivir 200mg  Continue Mucinex and Tessalon perles  OOB TID, encourage ambulation  High frequency chest wall oscillation  PT/OT    COPD with exacerbation (HCC)-resolved as of 12/11/2023  Assessment & Plan  Acute on Chronic COPD with home 3L O2 in setting of COVID pneumonia. At home on Trelegy, Flonase, Duonebs, & Albuterol PRN  Continue home meds with hospital equivalents  Incentive spirometry  O2 supplementation, titrate for spO2 88-92%  Rest of plan as per 'Pneumonia due to Covid'    [address]  Reports fall at home prior to admission with point tenderness in right shoulder with decreased ROM.  strength and distal sensory intact. No bruising or superficial injury noted on exam.  - CT head & cervical spine: no acute osseous abnormality  - XR L shoulder: no acute osseous abnormality (pending official read)  Starting Ofirmev and Tramadol PRN  Starting Lidocaine 5% patch  MRI for possible rotator cuff injury on discharge  PT/OT    Severe pain of left shoulder  Assessment & Plan  Chronic, worsened after fall.  Patient with limited ROM (limited ABDuction, cannot make fist). Pain is severe. Xray negative for acute osseous abnormality  MRI shoulder outpatient  Rest of a/p under 'Fall'    Chronic lymphocytic leukemia of B-cell type in remission [facility])  Assessment & Plan  Chronic, pt follows with Dr Yang Butts, was told he doesn't need to be on treatment. Chronic diastolic congestive heart failure [facility])  Assessment & Plan  Wt Readings from Last 3 Encounters:   12/11/23 108 kg (238 lb 1.6 oz)   12/04/23 109 kg (241 lb)   10/25/23 104 kg (229 lb)     Chronic, with low normal systolic function and LV EF 55% base on 2022 Echo. Examines euvolemic does not appear to be in exacerbation, held home Bumex and Metoprolol on admission in setting of low BP & ANA LILIA 2/2 Covid Pneumonia    Continue digoxin  Continue metoprolol and bumex  Fluid restriction 1500    Hyperlipidemia  Assessment & Plan  Continue home Lipitor and formulary substitution for his omega-3    Chronic a-fib (HCC)  Assessment & Plan  Chronic, at home on Digoxin 0.25mcg PO daily & Metoprolol 200mg PO QD.  - EKG: Atrial fibrillation without RVR  Chads Vasc 3  Continue Eliquis  Continue Digoxin  Continue metoprolol    Obstructive sleep apnea  Assessment & Plan  BiPAP at bedtime    Type 2 diabetes mellitus with complication, with long-term current use of insulin [facility])  Assessment & Plan  Lab Results   Component Value Date    HGBA1C 9.8 (H) 10/19/2023       Recent Labs     12/10/23  2144 12/10/23  2204 12/11/23  0711 12/11/23  1104   POCGLU 366* 425* 211* 232*       Blood Sugar Average: Last 72 hrs:  (P) 291.4375    Chronic, uncontrolled. Complicated with peripheral neuropathy. Continue home Lantus 50 twice daily  Insulin sliding scale + accuchecks ACHS  Diabetic diet  Continue home Gabapentin 800mg BID    Chronic respiratory failure (HCC)  Assessment & Plan  COPD and chronic hypoxic respiratory failure requiring 3 L of oxygen during the day and BiPAP with 3 L of oxygen at night. Currently on baseline oxygen requirements. High risk in setting of COVID pneumonia. Continues to be at baseline O2 requirements  Monitor spO2, continue supplemental oxygen  Continue Bipap HS  Respiratory protocol  Rest of plan as per COPD    GERD  Assessment & Plan  Continue home Protonix    Essential hypertension  Assessment & Plan  Presented with hypotension; metoprolol, bumex and losartan held on admission. Will restart as appropriate  BP Improved  Resume home medications on discharge    Coronary artery disease  Assessment & Plan  Chronic, stable. Rate controlled afib on EKG     Negative troponins    Morbid obesity   Assessment & Plan  Consider nutrition consult when appropriate as after patient's status has improved    Psychiatric disorder  Assessment & Plan  Continue home BuSpar and sertraline    Septic shock (HCC)-resolved as of 12/10/2023  Assessment & Plan  Prior to arrival as evidenced by hypotension 60/41, leukocytosis 28.20, tachypnea RR 21, in setting of COVID-19. LA 2.4    ED: IV NS 2L bolus    CT C/A/P: Mixed groundglass and consolidative change in the right lower lobe. Mucus plugging of some distal branches in the right lower lobe. Mediastinal and right hilar lymphadenopathy. Large amount of stool in the rectum and sigmoid colon. Mild diffuse wall thickening of the urinary bladder. Prostatomegaly. More likely COVID pneumonia r/o lobar/aspiration pneumonia, less likely Acute Colitis vs Acute cystitis   - COVID positive  - Bcx 1/2 neg, Bcx 2/2 gram+ likely contaminant  - UA & Ucx negative  - Pt reports normal bowel movements and no abdominal pain    Received empiric coverage with IV Rocephin 1g QD & IV Doxycycline Q12hrs  Avoiding IVF due to risk of CHF exacerbation  Rest of A/P as per 'Pneumonia due to [employer]' - Mild Pathway    ANA LILIA (acute kidney injury) (HCC)-resolved as of 12/9/2023  Assessment & Plan  Likely Prerenal, presented with CR of 1.31. Currently at baseline Cr.     Limit nephrotoxins as possible hold home losartan etc.  S/p fluid bolus in ED         Condition at Discharge: stable      Discharge Day Visit / Exam:      Vitals: Blood Pressure: (!) 178/81 (12/11/23 0720)  Pulse: 72 (12/11/23 0720)  Temperature: 98.2 °F (36.8 °C) (12/11/23 0720)  Temp Source: Oral (12/11/23 0720)  Respirations: 20 (12/11/23 0720)  Height: 5' 11" (180.3 cm) (12/08/23 0817)  Weight - Scale: 108 kg (238 lb 1.6 oz) (12/11/23 0445)  SpO2: 95 % (12/11/23 0720)    Exam:   Physical Exam  Vitals reviewed. Constitutional:       General: He is not in acute distress. Appearance: Normal appearance. He is obese. Cardiovascular:      Rate and Rhythm: Normal rate and regular rhythm. Heart sounds: No murmur heard. Pulmonary:      Effort: Pulmonary effort is normal. No respiratory distress. Breath sounds: No wheezing or rhonchi. Comments: Room air  Abdominal:      General: Bowel sounds are normal. There is no distension. Palpations: Abdomen is soft. Musculoskeletal:      Left shoulder: Tenderness present. No swelling. Deformity: Left shoulder is lower compared to the right. Decreased range of motion. Right lower leg: Edema present. Left lower leg: Edema present. Comments: Trace edema b/l   Neurological:      Mental Status: He is alert. Discharge instructions/Information to patient and family:   See after visit summary for information provided to patient and family. Discharge Medications:     Medication List      CONTINUE taking these medications     Adjustable Lancing Device Misc; USE AS DIRECTED   Alcohol Prep 70 % Pads;  Apply topically 4 (four) times a day As directed   * [place] Choice Comfort EZ 33G X 4 MM Misc; Generic drug: Insulin Pen   Needle   * [place] Choice Comfort EZ 33G X 4 MM Misc; Generic drug: Insulin Pen   Needle; USE TO INJECT INSULIN 5 TIMES A DAY   * B-D ULTRAFINE III SHORT PEN 31G X 8 MM Misc; Generic drug: Insulin Pen   Needle   * Unifine SafeControl Pen Needle 30G X 5 MM Misc; Generic drug: Insulin   Pen Needle   * Insulin Pen Needle 31G X 5 MM Misc; Inject under the skin 4 (four)   times a day   * FreeStyle [place] 14 Day Sensor Misc; Use 1 application. every 14   (fourteen) days   * FreeStyle [place] 2 Sensor Misc; Check blood sugars multiple times per   day   * FreeStyle [place] 14 Day Sensor Misc; Use as directed-   onetouch ultrasoft lancets; test blood sugar 3 (three) times a day   twidox System w/Device Kit  * This list has 8 medication(s) that are the same as other medications   prescribed for you. Read the directions carefully, and ask your doctor or   other care provider to review them with you. ASK your doctor about these medications     acetaminophen 325 mg tablet; Commonly known as: TYLENOL; Take 2 tablets   (650 mg total) by mouth every 6 (six) hours as needed for mild pain,   headaches or fever   apixaban 5 mg; Commonly known as: Eliquis; Take 1 tablet (5 mg total) by   mouth 2 (two) times a day   aspirin 81 MG tablet   atorvastatin 10 mg tablet; Commonly known as: LIPITOR; Take 1 tablet (10   mg total) by mouth daily   benzonatate 100 mg capsule; Commonly known as: TESSALON PERLES; Take 1   capsule (100 mg total) by mouth 3 (three) times a day as needed for cough   bumetanide 1 mg tablet; Commonly known as: BUMEX; Take 1 tablet (1 mg   total) by mouth 2 (two) times a day   busPIRone 10 mg tablet; Commonly known as: BUSPAR; TAKE ONE TABLET BY   MOUTH TWICE DAILY   CENTRUM SILVER 50+MEN PO   cholecalciferol 1,000 units tablet; Commonly known as: VITAMIN D3; Take   1 tablet (1,000 Units total) by mouth daily   Diclofenac Sodium 1 %; Commonly known as: VOLTAREN; Apply 2 g topically   4 (four) times a day for 14 days   digoxin 0.25 mg tablet; Commonly known as: LANOXIN; Take 1 tablet (250   mcg total) by mouth daily   docusate sodium 100 mg capsule; Commonly known as: COLACE;  Take 1   capsule (100 mg total) by mouth 2 (two) times a day   fluticasone 50 mcg/act nasal spray; Commonly known as: FLONASE; 1 spray   into each nostril daily Do not start before May 12, 2023. fluticasone-umeclidinium-vilanterol 100-62.5-25 MCG/INH inhaler;   Commonly known as: TRELEGY; Inhale 1 puff daily Rinse mouth after use.   gabapentin 800 mg tablet; Commonly known as: Neurontin; Take 1 tablet   (800 mg total) by mouth 4 (four) times a day   guaiFENesin 600 mg 12 hr tablet; Commonly known as: [place]; Take 2   tablets (1,200 mg total) by mouth every 12 (twelve) hours for 14 days   Icosapent Ethyl 1 g Caps; TAKE 2 CAPSULES TWICE A DAY   Insulin Glargine Solostar 100 UNIT/ML Sopn; Commonly known as: Lantus   SoloStar; Inject 0.5 mL (50 Units total) under the skin 2 (two) times a   day   insulin lispro 100 units/mL injection; Commonly known as: HumaLOG; Inject 1-6 Units under the skin 3 (three) times a day before meals   ipratropium-albuterol 0.5-2.5 mg/3 mL nebulizer solution; Commonly known   as: DUO-NEB; Take 3 mL by nebulization every 6 (six) hours   lamoTRIgine 25 mg tablet; Commonly known as: LaMICtal   losartan 50 mg tablet; Commonly known as: COZAAR; Take 1 tablet (50 mg   total) by mouth daily   magnesium Oxide 400 mg Tabs; Commonly known as: MAG-OX; Take 1 tablet   (400 mg total) by mouth daily Do not start before October 26, 2023. metFORMIN 1000 MG tablet; Commonly known as: GLUCOPHAGE; Take 1 tablet   (1,000 mg total) by mouth 2 (two) times a day with meals   metoprolol succinate 200 MG 24 hr tablet; Commonly known as: TOPROL-XL; Take 1 tablet (200 mg total) by mouth daily   omeprazole 20 mg delayed release capsule; Commonly known as: PriLOSEC; Take 1 capsule (20 mg total) by mouth daily   pantoprazole 40 mg tablet; Commonly known as: PROTONIX; TAKE 1 TABLET   DAILY   potassium chloride 20 mEq tablet; Commonly known as: K-DUR,KLOR-CON;    Take 1 tablet (20 mEq total) by mouth every other day   predniSONE 20 mg tablet; 2 tabs daily x 4 days, 1 1/2 tab daily x 4   days, 1 tab daily x 4 days then 1/2 tab daily x 4 day. s   * QUEtiapine 100 mg tablet; Commonly known as: SEROquel; Take 1 tablet   (100 mg total) by mouth daily at bedtime   * QUEtiapine 300 mg tablet; Commonly known as: SEROquel; Take 1 tablet   (300 mg total) by mouth daily at bedtime   ReliOn True Metrix Test Strips test strip; Generic drug: glucose blood;   Use as instructed   sertraline 50 mg tablet; Commonly known as: ZOLOFT; Take 1 tablet (50 mg   total) by mouth daily Daily at bedtime   sodium chloride 1 g tablet; TAKE 1 TABLET DAILY IN THE MORNING   sulfamethoxazole-trimethoprim 800-160 mg per tablet; Commonly known as:   BACTRIM DS; Take 1 tablet by mouth 2 (two) times a day for 14 days   tiZANidine 4 mg tablet; Commonly known as: Chris Bolds; Take 1 tablet (4 mg   total) by mouth every 8 (eight) hours as needed for muscle spasms   traMADol 50 mg tablet; Commonly known as: ULTRAM; TAKE 1 TABLET EVERY 6   HOURS AS NEEDED FOR MODERATE PAIN   * Ventolin HFA 90 mcg/act inhaler; Generic drug: albuterol; INHALE 2   PUFFS EVERY 6 (SIX) HOURS AS NEEDED FOR WHEEZING   * albuterol (2.5 mg/3 mL) 0.083 % nebulizer solution; USE 1 VIAL (2.5 MG   TOTAL) BY NEBULIZATION EVERY 6 HOURS AS NEEDED FOR WHEEZING   Victoza injection; Generic drug: liraglutide; INJECT 0.3ML UNDER THE   SKIN EVERY MORNING   vitamin C 1000 MG tablet  * This list has 4 medication(s) that are the same as other medications   prescribed for you. Read the directions carefully, and ask your doctor or   other care provider to review them with you. Disposition:   Home with VNA Services (Reminder: Complete face to face encounter)     For Discharges to [facility]:   ·       Not Applicable to this Patient - Not Applicable to this Patient     Discharge Statement:  I spent 45 minutes discharging the patient. This time was spent on the day of discharge. I had direct contact with the patient on the day of discharge.  Greater than 50% of the total time was spent examining patient, answering all patient questions, arranging and discussing plan of care with patient as well as directly providing post-discharge instructions. Additional time then spent on discharge activities.      ** Please Note: This note has been constructed using a voice recognition system **     Zack Ashley MD   12/11/23  1:01 PM

## 2023-12-11 NOTE — PLAN OF CARE
Problem: PAIN - ADULT  Goal: Verbalizes/displays adequate comfort level or baseline comfort level  Description: Interventions:  - Encourage patient to monitor pain and request assistance  - Assess pain using appropriate pain scale  - Administer analgesics based on type and severity of pain and evaluate response  - Implement non-pharmacological measures as appropriate and evaluate response  - Consider cultural and social influences on pain and pain management  - Notify physician/advanced practitioner if interventions unsuccessful or patient reports new pain  Outcome: Progressing     Problem: INFECTION - ADULT  Goal: Absence or prevention of progression during hospitalization  Description: INTERVENTIONS:  - Assess and monitor for signs and symptoms of infection  - Monitor lab/diagnostic results  - Monitor all insertion sites, i.e. indwelling lines, tubes, and drains  - Monitor endotracheal if appropriate and nasal secretions for changes in amount and color  - [place] appropriate cooling/warming therapies per order  - Administer medications as ordered  - Instruct and encourage patient and family to use good hand hygiene technique  - Identify and instruct in appropriate isolation precautions for identified infection/condition  Outcome: Progressing     Problem: SAFETY ADULT  Goal: Patient will remain free of falls  Description: INTERVENTIONS:  - Educate patient/family on patient safety including physical limitations  - Instruct patient to call for assistance with activity   - Consult OT/PT to assist with strengthening/mobility   - Keep Call bell within reach  - Keep bed low and locked with side rails adjusted as appropriate  - Keep care items and personal belongings within reach  - Initiate and maintain comfort rounds  - Make Fall Risk Sign visible to staff  - Offer Toileting every 2 Hours, in advance of need  - Initiate/Maintain bed alarm  - Obtain necessary fall risk management equipment: yes  - Apply yellow socks and bracelet for high fall risk patients  - Consider moving patient to room near nurses station  Outcome: Progressing  Goal: Maintain or return to baseline ADL function  Description: INTERVENTIONS:  -  Assess patient's ability to carry out ADLs; assess patient's baseline for ADL function and identify physical deficits which impact ability to perform ADLs (bathing, care of mouth/teeth, toileting, grooming, dressing, etc.)  - Assess/evaluate cause of self-care deficits   - Assess range of motion  - Assess patient's mobility; develop plan if impaired  - Assess patient's need for assistive devices and provide as appropriate  - Encourage maximum independence but intervene and supervise when necessary  - Involve family in performance of ADLs  - Assess for home care needs following discharge   - Consider OT consult to assist with ADL evaluation and planning for discharge  - Provide patient education as appropriate  Outcome: Progressing     Problem: RESPIRATORY - ADULT  Goal: Achieves optimal ventilation and oxygenation  Description: INTERVENTIONS:  - Assess for changes in respiratory status  - Assess for changes in mentation and behavior  - Position to facilitate oxygenation and minimize respiratory effort  - Oxygen administered by appropriate delivery if ordered  - Initiate smoking cessation education as indicated  - Encourage broncho-pulmonary hygiene including cough, deep breathe, Incentive Spirometry  - Assess the need for suctioning and aspirate as needed  - Assess and instruct to report SOB or any respiratory difficulty  - Respiratory Therapy support as indicated  Outcome: Progressing     Problem: METABOLIC, FLUID AND ELECTROLYTES - ADULT  Goal: Glucose maintained within target range  Description: INTERVENTIONS:  - Monitor Blood Glucose as ordered  - Assess for signs and symptoms of hyperglycemia and hypoglycemia  - Administer ordered medications to maintain glucose within target range  - Assess nutritional intake and initiate nutrition service referral as needed  Outcome: Progressing

## 2023-12-12 NOTE — UTILIZATION REVIEW
NOTIFICATION OF ADMISSION DISCHARGE   This is a Notification of Discharge from [address]. Please be advised that this patient has been discharge from our facility. Below you will find the admission and discharge date and time including the patient’s disposition. UTILIZATION REVIEW CONTACT:  Willian Toth  Utilization   Network Utilization Review Department  Phone: [phone] x carefully listen to the prompts. All voicemails are confidential.  Email: [email or web address]     ADMISSION INFORMATION  PRESENTATION DATE: 12/8/2023 12:51 AM  OBERVATION ADMISSION DATE:   INPATIENT ADMISSION DATE: 12/8/23  5:01 AM   DISCHARGE DATE: 12/11/2023  5:15 PM   DISPOSITION:Home with [facility] Utilization Review Department  ATTENTION: Please call with any questions or concerns to [phone] and carefully listen to the prompts so that you are directed to the right person. All voicemails are confidential.   For Discharge needs, contact Care Management DC Support Team at [phone] opt. 2  Send all requests for admission clinical reviews, approved or denied determinations and any other requests to dedicated fax number below belonging to the campus where the patient is receiving treatment.  List of dedicated fax numbers for the Facilities:  [place] DENIALS (Administrative/Medical Necessity) [phone]   DISCHARGE SUPPORT TEAM (Network) [phone] [facility] [phone]   [address] [facility] [address] [place] [address] [address]  [facility] [phone]

## 2023-12-13 LAB — BACTERIA BLD CULT: NORMAL

## 2023-12-15 ENCOUNTER — TELEPHONE (OUTPATIENT)
Dept: FAMILY MEDICINE CLINIC | Facility: CLINIC | Age: 63
End: 2023-12-15

## 2023-12-15 NOTE — TELEPHONE ENCOUNTER
I am located in [place] today therefore I cannot do any [facility] virtual visits because the insurance will not cover it. Also the last appointment was a virtual visit in July. I provided refills in October stating he needed to make a follow-up appointment at that time. Patient declines injections at this time, we are prescribing gabapentin and tizanidine. If patient's PCP is willing to take over these medications it might be better for the patient since he needs to have an in person office visit at this point.

## 2023-12-15 NOTE — TELEPHONE ENCOUNTER
Caller: Alonzo    Doctor: Julius Wheeler     Reason for call: patient unable to leave house has Covid, he was just discharted from hospital, requesting virtual visit please advise    tiZANidine (ZANAFLEX) 4 mg tablet   Gabapentin 800 mg 4 times a day 0 left       Call back#: [phone]

## 2023-12-15 NOTE — TELEPHONE ENCOUNTER
2 separate forms from [facility]  12/11/23- skilled nursing  Fax: [phone]    Order date 12/13/23  Placed both in pcp's folder  Fax: [phone]

## 2023-12-18 DIAGNOSIS — M79.18 MYOFASCIAL PAIN SYNDROME: ICD-10-CM

## 2023-12-18 DIAGNOSIS — E11.8 TYPE 2 DIABETES MELLITUS WITH COMPLICATION, WITH LONG-TERM CURRENT USE OF INSULIN (HCC): ICD-10-CM

## 2023-12-18 DIAGNOSIS — Z79.4 TYPE 2 DIABETES MELLITUS WITH COMPLICATION, WITH LONG-TERM CURRENT USE OF INSULIN (HCC): ICD-10-CM

## 2023-12-18 DIAGNOSIS — R52 SEVERE PAIN: ICD-10-CM

## 2023-12-18 DIAGNOSIS — E87.6 HYPOKALEMIA: ICD-10-CM

## 2023-12-18 DIAGNOSIS — G62.9 PERIPHERAL POLYNEUROPATHY: ICD-10-CM

## 2023-12-18 RX ORDER — POTASSIUM CHLORIDE 20 MEQ/1
20 TABLET, EXTENDED RELEASE ORAL
Qty: 15 TABLET | Refills: 0 | Status: SHIPPED | OUTPATIENT
Start: 2023-12-18 | End: 2024-01-17

## 2023-12-18 RX ORDER — GABAPENTIN 800 MG/1
800 TABLET ORAL 4 TIMES DAILY
Qty: 120 TABLET | Refills: 3 | Status: SHIPPED | OUTPATIENT
Start: 2023-12-18

## 2023-12-18 RX ORDER — TIZANIDINE 4 MG/1
4 TABLET ORAL EVERY 8 HOURS PRN
Qty: 90 TABLET | Refills: 0 | Status: SHIPPED | OUTPATIENT
Start: 2023-12-18 | End: 2024-01-17

## 2023-12-18 NOTE — PROGRESS NOTES
Medication order refill.  Pain specialist will not see patient if cannot go in person.  Patient received these medications in hospital.

## 2023-12-19 RX ORDER — TRAMADOL HYDROCHLORIDE 50 MG/1
50 TABLET ORAL EVERY 6 HOURS PRN
Qty: 120 TABLET | Refills: 0 | Status: SHIPPED | OUTPATIENT
Start: 2023-12-19

## 2023-12-19 NOTE — DISCHARGE SUMMARY
Discharge Summary - [facility]   Patient Information: Alonzo Watson 63 y.o. male MRN: [id number]  Unit/Bed#: [address] [place] [address] Encounter: [id number]  Admitting Physician: Jeanna Alexander MD  Discharging Physician/Practitioner: Jeanna Alexander MD   PCP: MANAS Payne    Admission Date:   Admission Orders (From admission, onward)       Ordered        12/08/23 0501  INPATIENT ADMISSION  Once                          Discharge Date: 12/11/23      Reason for Admission:  Pneumonia [J18.9]  Hypotension [I95.9]  Shock circulatory (HCC) [R57.9]  COVID [U07.1]     Discharge Diagnoses:      Principal Problem (Resolved):    Pneumonia due to COVID-19 virus  Active Problems:    Psychiatric disorder    Morbid obesity     Coronary artery disease    Essential hypertension    GERD    Chronic respiratory failure (HCC)    Type 2 diabetes mellitus with complication, with long-term current use of insulin (HCC)    Obstructive sleep apnea    Chronic a-fib (HCC)    Hyperlipidemia    Chronic diastolic congestive heart failure (HCC)    Chronic lymphocytic leukemia of B-cell type in remission ([facility])    Severe pain of left shoulder    Fall  Resolved Problems:    COPD with exacerbation ([facility])    ANA LILIA (acute kidney injury) ([facility])    Septic shock ([facility])       Consultations During Hospital Stay:  ·       None     Procedures Performed:   ·       None     Significant Findings / Test Results:   12/11: 1/2 Bcx + s. Epidermidis, CRP WNL  12/10: Na 134, WBC 17.38, CRP 5.2, CK WNL  12/9: WBC 19.92, Bcx 1/2 neg, Bcx 2/2 gram+ likely contaminant  12/8: WBC 25.38  12/7: , , CR 1.31, LA 2.4, 2Hr Trop Delta -3 (12>9), WBC 28.20, COVID +, UA neg    EKG: Atrial fibrillation without RVR    CT: Mixed GGO in RLL, mediastinal and right lymphadenopathy, large stool in rectum and colon suggesting impaction, mild diffuse thickening of urinary bladder possible cystitis or chronic outlet obstruction, prostamegaly, no  cervical spine fracture or traumatic malalignment, no acute intracranial abnormality    CT head & cervical spine: no acute osseous abnormality    XR L shoulder: no acute osseous abnormality (pending official read)       Incidental Findings:   ·       None      Test Results Pending at Discharge (will require follow up):   ·       None     Outpatient Tests Requested:  ·       Left Shoulder MRI     Outpatient follow-up Requested:  ·       None    Complications:  None    Things to address at first visit after hospitalization   Has your breathing improved?  Did you schedule your MRI of the left shoulder?  Is your left shoulder pain better?  Have you been working with physical therapy?  Patient with high insulin requirements, uncontrolled diabetes with A1c of 9.8. On Lantus 50 twice daily.  Consider modifying insulin regimen, may need mealtime insulin.    Hospital Course:      Alonzo Watson is a 63 y.o. male with past medical history of COPD on 3L oxygen at home, chronic A-fib, ambulatory dysfunction, CLL not on any chemotherapy, hyponatremia, diabetes presented to the hospital on 12/8/2023 due to weakness and general malaise.  Patient stood up from his chair and felt weak, fell down.  He did not hit his head, he did not lose consciousness.  On arrival to the ER he was found to be positive for COVID.  He also has history of back and shoulder pain, states his shoulder feels worse after his fall.    CT revealing groundglass opacities and consolidative change, patient started on COVID pathway received remdesivir, baricitinib, dexamethasone.  Was treated empirically with IV Rocephin and IV Doxy.  He also received symptomatic treatment and airway/respiratory protocol.  He had 1/2 blood cultures positive for staph epi which is likely contaminant.  He was much improved on day of discharge, ranging from room air to 3 L O2 which is his baseline.    Shoulder pain treated with Tylenol, tramadol, lidocaine patch and PT/OT evaluation.   Recommend MRI of left shoulder outpatient follow-up with physical therapy and patient discharged home with home health services.  See below for more details      * Pneumonia due to COVID-19 virus-resolved as of 12/11/2023  Assessment & Plan  Covid positive with mixed GGO & consolidative change in right lower lobe on chest CT. On MILD COVID pathway due to baseline O2 requirements of 3L O2 via NC days and BIPAP nights.  Higher risk as evidenced by CLL in remission, COPD, heart failure, baseline oxygen use     - EKG: atrial fibrillation (chronic), 2Hr Trop Delta -3 (12>9), BNP 71,   - CRP 1.5, LA down-trending, WBC down-trending      Empiric coverage with IV Rocephin 1g QD & IV Doxycycline Q12hrs, received total 3 days Abx treatment.  Trend CRP x3 days, downtrending on DC  Continue Eliquis 5mg PO BID  Dexamethasone 6mg IV daily  Baricitinib 4mg PO QD  Remdecivir 200mg  Continue Mucinex and Tessalon perles  OOB TID, encourage ambulation  High frequency chest wall oscillation  PT/OT    COPD with exacerbation (HCC)-resolved as of 12/11/2023  Assessment & Plan  Acute on Chronic COPD with home 3L O2 in setting of COVID pneumonia. At home on Trelegy, Flonase, Duonebs, & Albuterol PRN  Continue home meds with hospital equivalents  Incentive spirometry  O2 supplementation, titrate for spO2 88-92%  Rest of plan as per 'Pneumonia due to Covid'    Fall  Assessment & Plan  Reports fall at home prior to admission with point tenderness in right shoulder with decreased ROM.  strength and distal sensory intact. No bruising or superficial injury noted on exam.  - CT head & cervical spine: no acute osseous abnormality  - XR L shoulder: no acute osseous abnormality (pending official read)  Starting Ofirmev and Tramadol PRN  Starting Lidocaine 5% patch  MRI for possible rotator cuff injury on discharge  PT/OT    Severe pain of left shoulder  Assessment & Plan  Chronic, worsened after fall. Patient with limited ROM (limited ABDuction,  cannot make fist). Pain is severe.  Xray negative for acute osseous abnormality  MRI shoulder outpatient  Rest of a/p under 'Fall'    Chronic lymphocytic leukemia of B-cell type in remission (HCC)  Assessment & Plan  Chronic, pt follows with Dr Watson, was told he doesn't need to be on treatment.      Chronic diastolic congestive heart failure (HCC)  Assessment & Plan  Wt Readings from Last 3 Encounters:   12/11/23 108 kg (238 lb 1.6 oz)   12/04/23 109 kg (241 lb)   10/25/23 104 kg (229 lb)     Chronic, with low normal systolic function and LV EF 55% base on 2022 Echo. Examines euvolemic does not appear to be in exacerbation, held home Bumex and Metoprolol on admission in setting of low BP & ANA LILIA 2/2 Covid Pneumonia    Continue digoxin  Continue metoprolol and bumex  Fluid restriction 1500    Hyperlipidemia  Assessment & Plan  Continue home Lipitor and formulary substitution for his omega-3    Chronic a-fib ([facility])  Assessment & Plan  Chronic, at home on Digoxin 0.25mcg PO daily & Metoprolol 200mg PO QD.  - EKG: Atrial fibrillation without RVR  Chads Vasc 3  Continue Eliquis  Continue Digoxin  Continue metoprolol    Obstructive sleep apnea  Assessment & Plan  BiPAP at bedtime    Type 2 diabetes mellitus with complication, with long-term current use of insulin ([facility])  Assessment & Plan  Lab Results   Component Value Date    HGBA1C 9.8 (H) 10/19/2023       Recent Labs     12/10/23  2144 12/10/23  2204 12/11/23  0711 12/11/23  1104   POCGLU 366* 425* 211* 232*       Blood Sugar Average: Last 72 hrs:  (P) 291.4375    Chronic, uncontrolled. Complicated with peripheral neuropathy.  Continue home Lantus 50 twice daily  Insulin sliding scale + accuchecks ACHS  Diabetic diet  Continue home Gabapentin 800mg BID    Chronic respiratory failure ([facility])  Assessment & Plan  COPD and chronic hypoxic respiratory failure requiring 3 L of oxygen during the day and BiPAP with 3 L of oxygen at night. Currently on baseline oxygen requirements.  High risk in setting of COVID pneumonia.    Continues to be at baseline O2 requirements  Monitor spO2, continue supplemental oxygen  Continue Bipap HS  Respiratory protocol  Rest of plan as per COPD    GERD  Assessment & Plan  Continue home Protonix    Essential hypertension  Assessment & Plan  Presented with hypotension; metoprolol, bumex and losartan held on admission. Will restart as appropriate  BP Improved  Resume home medications on discharge    Coronary artery disease  Assessment & Plan  Chronic, stable.  Rate controlled afib on EKG     Negative troponins    Morbid obesity   Assessment & Plan  Consider nutrition consult when appropriate as after patient's status has improved    Psychiatric disorder  Assessment & Plan  Continue home BuSpar and sertraline    Septic shock (HCC)-resolved as of 12/10/2023  Assessment & Plan  Prior to arrival as evidenced by hypotension 60/41, leukocytosis 28.20, tachypnea RR 21, in setting of COVID-19. LA 2.4    ED: IV NS 2L bolus    CT C/A/P: Mixed groundglass and consolidative change in the right lower lobe. Mucus plugging of some distal branches in the right lower lobe. Mediastinal and right hilar lymphadenopathy. Large amount of stool in the rectum and sigmoid colon. Mild diffuse wall thickening of the urinary bladder. Prostatomegaly.    More likely COVID pneumonia r/o lobar/aspiration pneumonia, less likely Acute Colitis vs Acute cystitis   - COVID positive  - Bcx 1/2 neg, Bcx 2/2 gram+ likely contaminant  - UA & Ucx negative  - Pt reports normal bowel movements and no abdominal pain    Received empiric coverage with IV Rocephin 1g QD & IV Doxycycline Q12hrs  Avoiding IVF due to risk of CHF exacerbation  Rest of A/P as per 'Pneumonia due to COVID' - Mild Pathway    ANA LILIA (acute kidney injury) (HCC)-resolved as of 12/9/2023  Assessment & Plan  Likely Prerenal, presented with CR of 1.31.  Currently at baseline Cr.    Limit nephrotoxins as possible hold home losartan etc.  S/p  "fluid bolus in ED         Condition at Discharge: stable      Discharge Day Visit / Exam:      Vitals: Blood Pressure: (!) 178/81 (12/11/23 0720)  Pulse: 72 (12/11/23 0720)  Temperature: 98.2 °F (36.8 °C) (12/11/23 0720)  Temp Source: Oral (12/11/23 0720)  Respirations: 20 (12/11/23 0720)  Height: 5' 11\" (180.3 cm) (12/08/23 0817)  Weight - Scale: 108 kg (238 lb 1.6 oz) (12/11/23 0445)  SpO2: 95 % (12/11/23 0720)    Exam:   Physical Exam  Vitals reviewed.   Constitutional:       General: He is not in acute distress.     Appearance: Normal appearance. He is obese.   Cardiovascular:      Rate and Rhythm: Normal rate and regular rhythm.      Heart sounds: No murmur heard.  Pulmonary:      Effort: Pulmonary effort is normal. No respiratory distress.      Breath sounds: No wheezing or rhonchi.      Comments: Room air  Abdominal:      General: Bowel sounds are normal. There is no distension.      Palpations: Abdomen is soft.   Musculoskeletal:      Left shoulder: Tenderness present. No swelling. Deformity: Left shoulder is lower compared to the right.Decreased range of motion.      Right lower leg: Edema present.      Left lower leg: Edema present.      Comments: Trace edema b/l   Neurological:      Mental Status: He is alert.            Discharge instructions/Information to patient and family:   See after visit summary for information provided to patient and family.       Discharge Medications:     Medication List      CONTINUE taking these medications     Adjustable Lancing Device Misc; USE AS DIRECTED   Alcohol Prep 70 % Pads; Apply topically 4 (four) times a day As directed   * Hardy Choice Comfort EZ 33G X 4 MM Misc; Generic drug: Insulin Pen   Needle   * Hardy Choice Comfort EZ 33G X 4 MM Misc; Generic drug: Insulin Pen   Needle; USE TO INJECT INSULIN 5 TIMES A DAY   * B-D ULTRAFINE III SHORT PEN 31G X 8 MM Misc; Generic drug: Insulin Pen   Needle   * Unifine SafeControl Pen Needle 30G X 5 MM Misc; Generic drug: " Insulin   Pen Needle   * Insulin Pen Needle 31G X 5 MM Misc; Inject under the skin 4 (four)   times a day   * FreeStyle Sheba 14 Day Sensor Misc; Use 1 application. every 14   (fourteen) days   * FreeStyle Sheba 2 Sensor Misc; Check blood sugars multiple times per   day   * FreeStyle Sheba 14 Day Sensor Misc; Use as directed-   onetouch ultrasoft lancets; test blood sugar 3 (three) times a day   OneTouch Verio Flex System w/Device Kit  * This list has 8 medication(s) that are the same as other medications   prescribed for you. Read the directions carefully, and ask your doctor or   other care provider to review them with you.     ASK your doctor about these medications     acetaminophen 325 mg tablet; Commonly known as: TYLENOL; Take 2 tablets   (650 mg total) by mouth every 6 (six) hours as needed for mild pain,   headaches or fever   apixaban 5 mg; Commonly known as: Eliquis; Take 1 tablet (5 mg total) by   mouth 2 (two) times a day   aspirin 81 MG tablet   atorvastatin 10 mg tablet; Commonly known as: LIPITOR; Take 1 tablet (10   mg total) by mouth daily   benzonatate 100 mg capsule; Commonly known as: TESSALON PERLES; Take 1   capsule (100 mg total) by mouth 3 (three) times a day as needed for cough   bumetanide 1 mg tablet; Commonly known as: BUMEX; Take 1 tablet (1 mg   total) by mouth 2 (two) times a day   busPIRone 10 mg tablet; Commonly known as: BUSPAR; TAKE ONE TABLET BY   MOUTH TWICE DAILY   CENTRUM SILVER 50+MEN PO   cholecalciferol 1,000 units tablet; Commonly known as: VITAMIN D3; Take   1 tablet (1,000 Units total) by mouth daily   Diclofenac Sodium 1 %; Commonly known as: VOLTAREN; Apply 2 g topically   4 (four) times a day for 14 days   digoxin 0.25 mg tablet; Commonly known as: LANOXIN; Take 1 tablet (250   mcg total) by mouth daily   docusate sodium 100 mg capsule; Commonly known as: COLACE; Take 1   capsule (100 mg total) by mouth 2 (two) times a day   fluticasone 50 mcg/act nasal spray;  Commonly known as: FLONASE; 1 spray   into each nostril daily Do not start before May 12, 2023.   fluticasone-umeclidinium-vilanterol 100-62.5-25 MCG/INH inhaler;   Commonly known as: TRELEGY; Inhale 1 puff daily Rinse mouth after use.   gabapentin 800 mg tablet; Commonly known as: Neurontin; Take 1 tablet   (800 mg total) by mouth 4 (four) times a day   guaiFENesin 600 mg 12 hr tablet; Commonly known as: MUCINEX; Take 2   tablets (1,200 mg total) by mouth every 12 (twelve) hours for 14 days   Icosapent Ethyl 1 g Caps; TAKE 2 CAPSULES TWICE A DAY   Insulin Glargine Solostar 100 UNIT/ML Sopn; Commonly known as: Lantus   SoloStar; Inject 0.5 mL (50 Units total) under the skin 2 (two) times a   day   insulin lispro 100 units/mL injection; Commonly known as: HumaLOG;   Inject 1-6 Units under the skin 3 (three) times a day before meals   ipratropium-albuterol 0.5-2.5 mg/3 mL nebulizer solution; Commonly known   as: DUO-NEB; Take 3 mL by nebulization every 6 (six) hours   lamoTRIgine 25 mg tablet; Commonly known as: LaMICtal   losartan 50 mg tablet; Commonly known as: COZAAR; Take 1 tablet (50 mg   total) by mouth daily   magnesium Oxide 400 mg Tabs; Commonly known as: MAG-OX; Take 1 tablet   (400 mg total) by mouth daily Do not start before October 26, 2023.   metFORMIN 1000 MG tablet; Commonly known as: GLUCOPHAGE; Take 1 tablet   (1,000 mg total) by mouth 2 (two) times a day with meals   metoprolol succinate 200 MG 24 hr tablet; Commonly known as: TOPROL-XL;   Take 1 tablet (200 mg total) by mouth daily   omeprazole 20 mg delayed release capsule; Commonly known as: PriLOSEC;   Take 1 capsule (20 mg total) by mouth daily   pantoprazole 40 mg tablet; Commonly known as: PROTONIX; TAKE 1 TABLET   DAILY   potassium chloride 20 mEq tablet; Commonly known as: K-DUR,KLOR-CON;   Take 1 tablet (20 mEq total) by mouth every other day   predniSONE 20 mg tablet; 2 tabs daily x 4 days, 1 1/2 tab daily x 4   days, 1 tab daily x 4  days then 1/2 tab daily x 4 day.s   * QUEtiapine 100 mg tablet; Commonly known as: SEROquel; Take 1 tablet   (100 mg total) by mouth daily at bedtime   * QUEtiapine 300 mg tablet; Commonly known as: SEROquel; Take 1 tablet   (300 mg total) by mouth daily at bedtime   ReliOn True Metrix Test Strips test strip; Generic drug: glucose blood;   Use as instructed   sertraline 50 mg tablet; Commonly known as: ZOLOFT; Take 1 tablet (50 mg   total) by mouth daily Daily at bedtime   sodium chloride 1 g tablet; TAKE 1 TABLET DAILY IN THE MORNING   sulfamethoxazole-trimethoprim 800-160 mg per tablet; Commonly known as:   BACTRIM DS; Take 1 tablet by mouth 2 (two) times a day for 14 days   tiZANidine 4 mg tablet; Commonly known as: ZANAFLEX; Take 1 tablet (4 mg   total) by mouth every 8 (eight) hours as needed for muscle spasms   traMADol 50 mg tablet; Commonly known as: ULTRAM; TAKE 1 TABLET EVERY 6   HOURS AS NEEDED FOR MODERATE PAIN   * Ventolin HFA 90 mcg/act inhaler; Generic drug: albuterol; INHALE 2   PUFFS EVERY 6 (SIX) HOURS AS NEEDED FOR WHEEZING   * albuterol (2.5 mg/3 mL) 0.083 % nebulizer solution; USE 1 VIAL (2.5 MG   TOTAL) BY NEBULIZATION EVERY 6 HOURS AS NEEDED FOR WHEEZING   Victoza injection; Generic drug: liraglutide; INJECT 0.3ML UNDER THE   SKIN EVERY MORNING   vitamin C 1000 MG tablet  * This list has 4 medication(s) that are the same as other medications   prescribed for you. Read the directions carefully, and ask your doctor or   other care provider to review them with you.        Disposition:   Home with [facility] Services (Reminder: Complete face to face encounter)     For Discharges to [facility]:   ·       Not Applicable to this Patient - Not Applicable to this Patient     Discharge Statement:  I spent 45 minutes discharging the patient. This time was spent on the day of discharge. I had direct contact with the patient on the day of discharge. Greater than 50% of the total time was spent  examining patient, answering all patient questions, arranging and discussing plan of care with patient as well as directly providing post-discharge instructions.  Additional time then spent on discharge activities.     ** Please Note: This note has been constructed using a voice recognition system **     Kacie Grady MD   12/11/23  1:01 PM

## 2023-12-19 NOTE — PROGRESS NOTES
Attestation signed by Jeanna Alexander MD at 12/11/2023  5:39 PM (Updated)     Standard Teaching Supervising Statement     Patient was seen and examined at bedside today. The patient case has been discussed with the Resident. The Resident’s note, prescribed medications and orders placed have been reviewed.  I supervised the Resident and I agree with the Resident management plan as it was presented to me.  I was present in the hospital and examined the patient. Patient was made aware of the management and agreed with the current plan of care during admission and is currently medically optimized for discharge.         Patient seen and examined at bedside, no headache, no blurry vision, shortness of breath improved.  Patient reported being able to move his left arm a little better, will follow-up outpatient MRI.     PE:  General-NAD, obese, NC  HEENT- no nystagmus, perrla  Respi-non-labored, no wheezing  Cardio- no murmor, no rubs, no gallops  Abdomen-soft, nontender, no rebounding no guarding  Msk-left shoulder-improved ROM, improved tenderness,  BLLE pedal edema  Psych- cooperative     A/P:  Septic Shock-BCx 1/2, likely contaminant/ likely 2/2 to COVID,  imaging -ggo / r/o cystitis, and UA unremarkable, continue empiric abx  COVID- remdesimir, dexamethasone, O2 support,  Chronic hypoxic respiratory failure-at baseline, supportive care  Fall with shoulder pain-OP MRI, lidocaine shoulder x-ray negative for fracture or dislocation, ice, PT OT   A/C-HF- continue home metoprol, and diuretic acutely due to ANA LILIA close   GERD-continue home medicine  SHAVONNE- CPAP qhs  DMT2-9.8%, basal/prandial/SSI, D&E on DC  Psych disorder-continue home Zoloft and BuSpar  ANA LILIA -improving, likely 2/2 to dehydration, gently replenish fluids, hold diuretics, avoid neprhrotoxs  Anemia normocytic, will monitor, consider peripheral blood smear  CML-monitor for signs of infection, no noted active therapy     All chronic conditions controlled Home  medication inpatient equivalent     Jeanna Alexander MD

## 2023-12-20 ENCOUNTER — IN HOME VISIT (OUTPATIENT)
Dept: FAMILY MEDICINE CLINIC | Facility: CLINIC | Age: 63
End: 2023-12-20
Payer: COMMERCIAL

## 2023-12-20 DIAGNOSIS — E11.8 TYPE 2 DIABETES MELLITUS WITH COMPLICATION, WITH LONG-TERM CURRENT USE OF INSULIN (HCC): Primary | ICD-10-CM

## 2023-12-20 DIAGNOSIS — I48.0 PAROXYSMAL ATRIAL FIBRILLATION (HCC): ICD-10-CM

## 2023-12-20 DIAGNOSIS — Z74.1 NEED FOR ASSISTANCE WITH PERSONAL CARE: ICD-10-CM

## 2023-12-20 DIAGNOSIS — E11.69 TYPE 2 DIABETES MELLITUS WITH OTHER SPECIFIED COMPLICATION, WITH LONG-TERM CURRENT USE OF INSULIN (HCC): ICD-10-CM

## 2023-12-20 DIAGNOSIS — J96.11 CHRONIC RESPIRATORY FAILURE WITH HYPOXIA (HCC): ICD-10-CM

## 2023-12-20 DIAGNOSIS — J42 CHRONIC BRONCHITIS, UNSPECIFIED CHRONIC BRONCHITIS TYPE (HCC): ICD-10-CM

## 2023-12-20 DIAGNOSIS — M25.512 SEVERE PAIN OF LEFT SHOULDER: ICD-10-CM

## 2023-12-20 DIAGNOSIS — Z79.4 TYPE 2 DIABETES MELLITUS WITH COMPLICATION, WITH LONG-TERM CURRENT USE OF INSULIN (HCC): Primary | ICD-10-CM

## 2023-12-20 DIAGNOSIS — I13.0 HYPERTENSIVE HEART AND CHRONIC KIDNEY DISEASE WITH HEART FAILURE AND STAGE 1 THROUGH STAGE 4 CHRONIC KIDNEY DISEASE, OR UNSPECIFIED CHRONIC KIDNEY DISEASE (HCC): ICD-10-CM

## 2023-12-20 DIAGNOSIS — Z79.4 TYPE 2 DIABETES MELLITUS WITH OTHER SPECIFIED COMPLICATION, WITH LONG-TERM CURRENT USE OF INSULIN (HCC): ICD-10-CM

## 2023-12-20 DIAGNOSIS — C91.11 CHRONIC LYMPHOCYTIC LEUKEMIA OF B-CELL TYPE IN REMISSION (HCC): ICD-10-CM

## 2023-12-20 PROCEDURE — 99349 HOME/RES VST EST MOD MDM 40: CPT | Performed by: NURSE PRACTITIONER

## 2023-12-20 NOTE — PROGRESS NOTES
"Assessment/Plan:   Diagnoses and all orders for this visit:      Type 2 diabetes mellitus with other specified complication, with long-term current use of insulin (HCC)  -     insulin lispro (HumaLOG) 100 units/mL injection; Inject 15 Units under the skin 3 (three) times a day before meals  Metformin 1000 bid  Lantus 50 units q12h      Chronic respiratory failure with hypoxia (HCC)       -  continuous 02       Medication management      Hypertensive heart and chronic kidney disease with heart failure and stage 1 through stage 4 chronic kidney disease, or unspecified chronic kidney disease (HCC)       Med management    Paroxysmal atrial fibrillation (HCC)       Med management    Chronic lymphocytic leukemia of B-cell type in remission (HCC)    Severe pain of left shoulder       PT       Lidocaine patch    Need for assistance with personal care       HHA daily    Other orders  -     Aspercreme Lidocaine 4 % CREA; Apply 4 % topically Left shoulder      Subjective:      Patient ID: Alonzo Watson is a 63 y.o. male.    Patient seen at home today for follow up after hospital discharge on 12-19 s/p COVID pna.  Currently he is breathing better -on continuous 02.  He has left shoulder pain from what he said is falling prior to hospital from weakness.  He does not want to follow up with MRI since x-ray is \"ok\" and he is \"home bound\" and is going to receive PT at home this week.  His endocrinologist does not see him anymore since he cannot make office visits so we have a schedule at home to maintain blood glucose.  Problem is compliance.  He is on 15 units of novolog with meals tid but does not always eat or eats middle of the night.  Very hard to keep him on a schedule.  Also noted he has been hospitalized monthly recently.  I have encouraged him to call before he gets so ill but he says it often comes on so quickly he is unable to call me.  He suffers from chronic hypoxic respiratory failure.  He has been losing weight which " is desirable bit remains obese.  His mental health is stable and he states he is happy right now.  > 40 minutes was spent with patient on detailed history, physical exam and assessment, and planning and diagnosing and education.          The following portions of the patient's history were reviewed and updated as appropriate: allergies, current medications, past family history, past medical history, past social history, past surgical history, and problem list.    Review of Systems   Constitutional: Negative.    HENT: Negative.     Eyes: Negative.    Respiratory: Negative.     Cardiovascular:  Positive for leg swelling.   Gastrointestinal: Negative.    Endocrine: Negative.    Genitourinary: Negative.    Musculoskeletal: Negative.    Skin: Negative.    Allergic/Immunologic: Negative.    Neurological:  Positive for weakness.   Hematological: Negative.    Psychiatric/Behavioral: Negative.           Objective:      /60   Pulse 68   Temp 98 °F (36.7 °C)   Resp 18   SpO2 97%          Physical Exam  Constitutional:       General: He is not in acute distress.     Appearance: He is obese. He is not ill-appearing, toxic-appearing or diaphoretic.   HENT:      Head: Normocephalic and atraumatic.      Right Ear: External ear normal.      Left Ear: External ear normal.      Nose: Nose normal. No congestion.      Mouth/Throat:      Mouth: Mucous membranes are moist.   Eyes:      General:         Right eye: No discharge.         Left eye: No discharge.      Conjunctiva/sclera: Conjunctivae normal.   Cardiovascular:      Rate and Rhythm: Normal rate and regular rhythm.      Pulses: Pulses are weak.           Posterior tibial pulses are 1+ on the right side and 1+ on the left side.      Heart sounds: Normal heart sounds. No murmur heard.  Pulmonary:      Effort: Pulmonary effort is normal. No respiratory distress.      Breath sounds: No wheezing, rhonchi or rales.      Comments: Cough with deep breaths  Abdominal:       General: Bowel sounds are normal.      Tenderness: There is no abdominal tenderness. There is no guarding.      Comments: Abdomen round and firm   Musculoskeletal:      Cervical back: Normal range of motion. No rigidity.      Right lower leg: Edema present.      Left lower leg: Edema present.      Comments: Trace LE edema B/L   Feet:      Right foot:      Skin integrity: No ulcer, skin breakdown, erythema, warmth, callus or dry skin.      Left foot:      Skin integrity: No ulcer, skin breakdown, erythema, warmth, callus or dry skin.   Skin:     General: Skin is warm and dry.      Findings: No lesion or rash.   Neurological:      General: No focal deficit present.      Mental Status: He is alert and oriented to person, place, and time.      Gait: Gait abnormal.   Psychiatric:         Mood and Affect: Mood normal.         Behavior: Behavior normal.         Thought Content: Thought content normal.         Judgment: Judgment normal.      Comments: Regular telehealth psychiatry and psychology         Diabetic Foot Exam    Patient's shoes and socks removed.    Right Foot/Ankle   Right Foot Inspection  Skin Exam: skin normal and skin intact. No dry skin, no warmth, no callus, no erythema, no maceration, no abnormal color, no pre-ulcer, no ulcer and no callus.     Toe Exam: ROM and strength within normal limits.     Sensory   Monofilament testing: intact    Vascular  The right PT pulse is 1+.     Left Foot/Ankle  Left Foot Inspection  Skin Exam: skin normal and skin intact. No dry skin, no warmth, no erythema, no maceration, normal color, no pre-ulcer, no ulcer and no callus.     Toe Exam: ROM and strength within normal limits.     Sensory   Monofilament testing: intact    Vascular  The left PT pulse is 1+.     Assign Risk Category  No deformity present  No loss of protective sensation  Weak pulses  Risk: 1

## 2023-12-21 ENCOUNTER — TELEPHONE (OUTPATIENT)
Dept: FAMILY MEDICINE CLINIC | Facility: CLINIC | Age: 63
End: 2023-12-21

## 2023-12-22 ENCOUNTER — TELEPHONE (OUTPATIENT)
Dept: FAMILY MEDICINE CLINIC | Facility: CLINIC | Age: 63
End: 2023-12-22

## 2023-12-22 VITALS
RESPIRATION RATE: 18 BRPM | DIASTOLIC BLOOD PRESSURE: 60 MMHG | OXYGEN SATURATION: 97 % | HEART RATE: 68 BPM | TEMPERATURE: 98 F | SYSTOLIC BLOOD PRESSURE: 100 MMHG

## 2023-12-22 RX ORDER — LIDOCAINE HCL 4 G/100G
4 CREAM TOPICAL
COMMUNITY
Start: 2023-12-11

## 2023-12-22 RX ORDER — INSULIN LISPRO 100 [IU]/ML
15 INJECTION, SOLUTION INTRAVENOUS; SUBCUTANEOUS
Start: 2023-12-22 | End: 2024-01-21

## 2023-12-26 ENCOUNTER — TELEMEDICINE (OUTPATIENT)
Dept: PULMONOLOGY | Facility: MEDICAL CENTER | Age: 63
End: 2023-12-26
Payer: COMMERCIAL

## 2023-12-26 DIAGNOSIS — J44.9 CHRONIC OBSTRUCTIVE PULMONARY DISEASE, UNSPECIFIED COPD TYPE (HCC): Primary | ICD-10-CM

## 2023-12-26 DIAGNOSIS — J96.11 CHRONIC RESPIRATORY FAILURE WITH HYPOXIA (HCC): ICD-10-CM

## 2023-12-26 DIAGNOSIS — G47.33 OBSTRUCTIVE SLEEP APNEA: ICD-10-CM

## 2023-12-26 PROCEDURE — 99213 OFFICE O/P EST LOW 20 MIN: CPT | Performed by: STUDENT IN AN ORGANIZED HEALTH CARE EDUCATION/TRAINING PROGRAM
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
## 2023-12-27 PROBLEM — J44.9 CHRONIC OBSTRUCTIVE PULMONARY DISEASE, UNSPECIFIED COPD TYPE (HCC): Status: ACTIVE | Noted: 2023-12-27

## 2023-12-28 ENCOUNTER — TELEPHONE (OUTPATIENT)
Dept: FAMILY MEDICINE CLINIC | Facility: CLINIC | Age: 63
End: 2023-12-28

## 2023-12-28 NOTE — TELEPHONE ENCOUNTER
Fax received from [facility]. Copy of form scanned to encounter. Placed in Tuesdays folder in precepting room.

## 2024-01-04 ENCOUNTER — APPOINTMENT (EMERGENCY)
Dept: RADIOLOGY | Facility: HOSPITAL | Age: 64
End: 2024-01-04
Payer: COMMERCIAL

## 2024-01-04 ENCOUNTER — HOSPITAL ENCOUNTER (EMERGENCY)
Facility: HOSPITAL | Age: 64
Discharge: HOME | End: 2024-01-04
Attending: EMERGENCY MEDICINE
Payer: COMMERCIAL

## 2024-01-04 VITALS
HEART RATE: 84 BPM | DIASTOLIC BLOOD PRESSURE: 93 MMHG | TEMPERATURE: 98.5 F | RESPIRATION RATE: 16 BRPM | OXYGEN SATURATION: 95 % | SYSTOLIC BLOOD PRESSURE: 185 MMHG

## 2024-01-04 DIAGNOSIS — T14.8XXA CONTUSION: ICD-10-CM
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
## 2024-01-04 PROCEDURE — 99284 EMERGENCY DEPT VISIT MOD MDM: CPT | Performed by: EMERGENCY MEDICINE

## 2024-01-04 PROCEDURE — 96372 THER/PROPH/DIAG INJ SC/IM: CPT

## 2024-01-04 PROCEDURE — 73030 X-RAY EXAM OF SHOULDER: CPT

## 2024-01-04 PROCEDURE — 73060 X-RAY EXAM OF HUMERUS: CPT

## 2024-01-04 PROCEDURE — 99284 EMERGENCY DEPT VISIT MOD MDM: CPT

## 2024-01-04 RX ORDER — TRAMADOL HYDROCHLORIDE 50 MG/1
50 TABLET ORAL ONCE
Status: COMPLETED | OUTPATIENT
Start: 2024-01-04 | End: 2024-01-04

## 2024-01-04 RX ORDER — METHOCARBAMOL 750 MG/1
750 TABLET, FILM COATED ORAL EVERY 6 HOURS PRN
Qty: 30 TABLET | Refills: 0 | Status: SHIPPED | OUTPATIENT
Start: 2024-01-04

## 2024-01-04 RX ORDER — HYDROMORPHONE HCL/PF 1 MG/ML
1 SYRINGE (ML) INJECTION ONCE
Status: COMPLETED | OUTPATIENT
Start: 2024-01-04 | End: 2024-01-04

## 2024-01-04 RX ADMIN — HYDROMORPHONE HYDROCHLORIDE 1 MG: 1 INJECTION, SOLUTION INTRAMUSCULAR; INTRAVENOUS; SUBCUTANEOUS at 13:38

## 2024-01-04 RX ADMIN — TRAMADOL HYDROCHLORIDE 50 MG: 50 TABLET, FILM COATED ORAL at 19:30
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
## 2024-01-04 NOTE — ED NOTES
Patient told that  time is at 8:15 pm. Patient is at 3L O2 and thus needs to be on BLS transport.     Ernestina Mckinney RN  01/04/24 [id number]

## 2024-01-04 NOTE — ED NOTES
SALLY, RN received call from Hanh HUMMEL NP from [place] stated patient thinks he fell again & returning to ED for evaluation.       Glenny Chanel RN  01/04/24 1121

## 2024-01-04 NOTE — ED NOTES
Ok to give food as per Dr. Mayfield. Given  box lunch.     Ernestina Mckinney, MARY ANN  01/04/24 [id number]

## 2024-01-05 ENCOUNTER — VBI (OUTPATIENT)
Dept: FAMILY MEDICINE CLINIC | Facility: CLINIC | Age: 64
End: 2024-01-05

## 2024-01-05 ENCOUNTER — TELEPHONE (OUTPATIENT)
Dept: FAMILY MEDICINE CLINIC | Facility: CLINIC | Age: 64
End: 2024-01-05

## 2024-01-05 NOTE — TELEPHONE ENCOUNTER
01/05/24 12:59 PM    Patient contacted post ED visit, [facility] department spoke with patient/caregiver and outreach was successful.    Thank you.  Renuka Grady PG VALUE BASED VIR

## 2024-01-12 ENCOUNTER — TELEPHONE (OUTPATIENT)
Dept: FAMILY MEDICINE CLINIC | Facility: CLINIC | Age: 64
End: 2024-01-12

## 2024-01-12 LAB

## 2024-01-13 ENCOUNTER — APPOINTMENT (EMERGENCY)
Dept: RADIOLOGY | Facility: HOSPITAL | Age: 64
End: 2024-01-13
Payer: COMMERCIAL

## 2024-01-13 ENCOUNTER — HOSPITAL ENCOUNTER (EMERGENCY)
Facility: HOSPITAL | Age: 64
Discharge: HOME | End: 2024-01-13
Attending: EMERGENCY MEDICINE | Admitting: EMERGENCY MEDICINE
Payer: COMMERCIAL

## 2024-01-13 VITALS
SYSTOLIC BLOOD PRESSURE: 145 MMHG | TEMPERATURE: 97.8 F | DIASTOLIC BLOOD PRESSURE: 85 MMHG | RESPIRATION RATE: 18 BRPM | OXYGEN SATURATION: 100 % | HEART RATE: 89 BPM

## 2024-01-13 DIAGNOSIS — M79.604 RIGHT LEG PAIN: Primary | ICD-10-CM

## 2024-01-13 PROCEDURE — 99283 EMERGENCY DEPT VISIT LOW MDM: CPT

## 2024-01-13 PROCEDURE — 93971 EXTREMITY STUDY: CPT

## 2024-01-13 PROCEDURE — 73590 X-RAY EXAM OF LOWER LEG: CPT

## 2024-01-13 PROCEDURE — 93971 EXTREMITY STUDY: CPT | Performed by: SURGERY

## 2024-01-13 PROCEDURE — 99284 EMERGENCY DEPT VISIT MOD MDM: CPT | Performed by: EMERGENCY MEDICINE

## 2024-01-13 RX ORDER — HYDROCODONE BITARTRATE AND ACETAMINOPHEN 5; 325 MG/1; MG/1
1 TABLET ORAL ONCE
Status: COMPLETED | OUTPATIENT
Start: 2024-01-13 | End: 2024-01-13

## 2024-01-13 RX ORDER — CYCLOBENZAPRINE HCL 10 MG
10 TABLET ORAL 2 TIMES DAILY PRN
Qty: 10 TABLET | Refills: 0 | Status: SHIPPED | OUTPATIENT
Start: 2024-01-13 | End: 2024-01-13

## 2024-01-13 RX ORDER — TRAMADOL HYDROCHLORIDE 50 MG/1
50 TABLET ORAL EVERY 6 HOURS PRN
Qty: 10 TABLET | Refills: 0 | Status: SHIPPED | OUTPATIENT
Start: 2024-01-13 | End: 2024-01-16

## 2024-01-13 RX ADMIN — HYDROCODONE BITARTRATE AND ACETAMINOPHEN 1 TABLET: 5; 325 TABLET ORAL at 12:51

## 2024-01-13 RX ADMIN — APIXABAN 5 MG: 5 TABLET, FILM COATED ORAL at 12:51

## 2024-01-13 NOTE — ED NOTES
Request for transport home via w/c made through [employer].     Bernie Cespedes, RN  01/13/24 [id number]

## 2024-01-13 NOTE — ED NOTES
Vascular tech-on call Mireille Galvan contacted by this RN via [employer] to request services for pt. Per Mireille, ETA 30 minutes.     Sheryl Vásquez RN  01/13/24 [id number]
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
## 2024-01-15 ENCOUNTER — VBI (OUTPATIENT)
Dept: FAMILY MEDICINE CLINIC | Facility: CLINIC | Age: 64
End: 2024-01-15

## 2024-01-15 NOTE — TELEPHONE ENCOUNTER
01/15/24 9:01 AM    Patient contacted post ED visit, [facility] department spoke with patient/caregiver and outreach was successful.    Thank you.  ROBBIE DONALD  [facility]

## 2024-01-19 ENCOUNTER — TELEPHONE (OUTPATIENT)
Dept: FAMILY MEDICINE CLINIC | Facility: CLINIC | Age: 64
End: 2024-01-19

## 2024-01-19 DIAGNOSIS — M25.512 ACUTE PAIN OF LEFT SHOULDER: Primary | ICD-10-CM

## 2024-01-19 DIAGNOSIS — G62.9 PERIPHERAL POLYNEUROPATHY: ICD-10-CM

## 2024-01-19 DIAGNOSIS — M79.18 MYOFASCIAL PAIN SYNDROME: ICD-10-CM

## 2024-01-19 DIAGNOSIS — R52 SEVERE PAIN: ICD-10-CM

## 2024-01-19 RX ORDER — TIZANIDINE 4 MG/1
4 TABLET ORAL EVERY 8 HOURS PRN
Qty: 90 TABLET | Refills: 3 | Status: SHIPPED | OUTPATIENT
Start: 2024-01-19 | End: 2024-02-18

## 2024-01-19 RX ORDER — GABAPENTIN 800 MG/1
800 TABLET ORAL 4 TIMES DAILY
Qty: 120 TABLET | Refills: 3 | Status: SHIPPED | OUTPATIENT
Start: 2024-01-19

## 2024-01-19 RX ORDER — TRAMADOL HYDROCHLORIDE 50 MG/1
50 TABLET ORAL EVERY 6 HOURS PRN
Qty: 120 TABLET | Refills: 0 | Status: SHIPPED | OUTPATIENT
Start: 2024-01-19

## 2024-01-20 DIAGNOSIS — Z79.4 TYPE 2 DIABETES MELLITUS WITH HYPERGLYCEMIA, WITH LONG-TERM CURRENT USE OF INSULIN (HCC): ICD-10-CM

## 2024-01-20 DIAGNOSIS — E11.65 TYPE 2 DIABETES MELLITUS WITH HYPERGLYCEMIA, WITH LONG-TERM CURRENT USE OF INSULIN (HCC): ICD-10-CM

## 2024-01-20 DIAGNOSIS — E87.1 HYPONATREMIA: ICD-10-CM

## 2024-01-20 DIAGNOSIS — R79.89 LOW SERUM LOW DENSITY LIPOPROTEIN (LDL): ICD-10-CM

## 2024-01-20 DIAGNOSIS — Z79.4 TYPE 2 DIABETES MELLITUS WITH COMPLICATION, WITH LONG-TERM CURRENT USE OF INSULIN (HCC): ICD-10-CM

## 2024-01-20 DIAGNOSIS — E11.8 TYPE 2 DIABETES MELLITUS WITH COMPLICATION, WITH LONG-TERM CURRENT USE OF INSULIN (HCC): ICD-10-CM

## 2024-01-20 DIAGNOSIS — E78.5 HYPERLIPIDEMIA, UNSPECIFIED HYPERLIPIDEMIA TYPE: ICD-10-CM

## 2024-01-23 DIAGNOSIS — K21.00 GASTROESOPHAGEAL REFLUX DISEASE WITH ESOPHAGITIS WITHOUT HEMORRHAGE: ICD-10-CM

## 2024-01-23 RX ORDER — ATORVASTATIN CALCIUM 10 MG/1
10 TABLET, FILM COATED ORAL DAILY
Qty: 30 TABLET | Refills: 0 | Status: SHIPPED | OUTPATIENT
Start: 2024-01-23 | End: 2024-02-22

## 2024-01-23 RX ORDER — PANTOPRAZOLE SODIUM 40 MG/1
40 TABLET, DELAYED RELEASE ORAL DAILY
Qty: 30 TABLET | Refills: 2 | Status: SHIPPED | OUTPATIENT
Start: 2024-01-23

## 2024-01-23 RX ORDER — UBIQUINOL 100 MG
CAPSULE ORAL 4 TIMES DAILY
Qty: 100 EACH | Refills: 0 | Status: SHIPPED | OUTPATIENT
Start: 2024-01-23

## 2024-01-23 RX ORDER — SODIUM CHLORIDE 1 G/1
1 TABLET ORAL EVERY MORNING
Qty: 30 TABLET | Refills: 3 | Status: SHIPPED | OUTPATIENT
Start: 2024-01-23

## 2024-01-26 ENCOUNTER — TELEPHONE (OUTPATIENT)
Dept: FAMILY MEDICINE CLINIC | Facility: CLINIC | Age: 64
End: 2024-01-26

## 2024-02-01 ENCOUNTER — TELEPHONE (OUTPATIENT)
Age: 64
End: 2024-02-01

## 2024-02-01 NOTE — TELEPHONE ENCOUNTER
"Pt call and states he just got a new Bipap machine and has noticed when using it like the \"suction\" is too strong/long and he's states his last Bipap was not like this. Pt requesting a call back on next what to do.  "

## 2024-02-02 ENCOUNTER — TELEPHONE (OUTPATIENT)
Dept: FAMILY MEDICINE CLINIC | Facility: CLINIC | Age: 64
End: 2024-02-02

## 2024-02-06 ENCOUNTER — TELEPHONE (OUTPATIENT)
Age: 64
End: 2024-02-06

## 2024-02-06 ENCOUNTER — OFFICE VISIT (OUTPATIENT)
Dept: OBGYN CLINIC | Facility: CLINIC | Age: 64
End: 2024-02-06
Payer: COMMERCIAL

## 2024-02-06 VITALS
HEIGHT: 71 IN | WEIGHT: 238 LBS | DIASTOLIC BLOOD PRESSURE: 89 MMHG | SYSTOLIC BLOOD PRESSURE: 152 MMHG | HEART RATE: 76 BPM | BODY MASS INDEX: 33.32 KG/M2

## 2024-02-06 DIAGNOSIS — M25.512 ACUTE PAIN OF LEFT SHOULDER: ICD-10-CM

## 2024-02-06 DIAGNOSIS — R29.898 WEAKNESS OF LEFT ARM: ICD-10-CM

## 2024-02-06 DIAGNOSIS — M54.12 RADICULOPATHY, CERVICAL REGION: Primary | ICD-10-CM

## 2024-02-06 PROCEDURE — 99203 OFFICE O/P NEW LOW 30 MIN: CPT | Performed by: ORTHOPAEDIC SURGERY

## 2024-02-06 NOTE — TELEPHONE ENCOUNTER
Completed form has been faxed to the number listed below copy has been attached to this encounter.     [phone]

## 2024-02-06 NOTE — TELEPHONE ENCOUNTER
Incoming call:    Returning Selena's phone call.    Requesting call back    All questions answered. No further needs at the moment.
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
## 2024-02-08 NOTE — TELEPHONE ENCOUNTER
Pt calling back to speak to Selena. He states he will be next to his phone but may be sleeping due to taking medication. He states if she wants to call after two that may be better.

## 2024-02-09 ENCOUNTER — TELEPHONE (OUTPATIENT)
Dept: FAMILY MEDICINE CLINIC | Facility: CLINIC | Age: 64
End: 2024-02-09

## 2024-02-14 NOTE — TELEPHONE ENCOUNTER
Completed form has been faxed to the number listed below copy has been attached to this encounter.       Singed by:Astrid Reyes    [phone]

## 2024-02-15 NOTE — ASSESSMENT & PLAN NOTE
Addended by: DERRICK HERNANDEZ on: 2/15/2024 02:21 PM     Modules accepted: Orders     · DKA in the setting of pneumonia as well as possible gastroenteritis  · Patient presented with complaint of shortness of breath and diarrhea for the past few days  He had not been checking his glucose at home however he states that he has been compliant with his home medication which include metformin, Victoza, 75 units of glargine b i d  And 35 units of NovoLog t i d  · Last A1c was 05/23/2022 at 8 6  · Glucose on presentation was above 600 with repeat of 539 after 1 L bolus of fluid    · Beta hydroxybutyrate 2 7  · Anion gap was 14, pH 7 4, bicarb 20 6  · Lactic acid 2 5>> 2 0       · Patient admitted to level 1 step down  · Started on insulin GTT algorithm 1 and IV fluids  · Continue monitor potassium, magnesium and BMP

## 2024-02-16 ENCOUNTER — TELEPHONE (OUTPATIENT)
Dept: FAMILY MEDICINE CLINIC | Facility: CLINIC | Age: 64
End: 2024-02-16

## 2024-02-17 NOTE — TELEPHONE ENCOUNTER
"Completed form has been faxed to the number listed below; and placed in \"TO BE SCANNED BIN\"     [phone]      "

## 2024-02-19 DIAGNOSIS — E11.65 UNCONTROLLED TYPE 2 DIABETES MELLITUS WITH HYPERGLYCEMIA (HCC): ICD-10-CM

## 2024-02-19 DIAGNOSIS — G89.29 CHRONIC INTRACTABLE PAIN: Primary | ICD-10-CM

## 2024-02-19 DIAGNOSIS — Z79.4 TYPE 2 DIABETES MELLITUS WITH COMPLICATION, WITH LONG-TERM CURRENT USE OF INSULIN (HCC): ICD-10-CM

## 2024-02-19 DIAGNOSIS — E11.8 TYPE 2 DIABETES MELLITUS WITH COMPLICATION, WITH LONG-TERM CURRENT USE OF INSULIN (HCC): ICD-10-CM

## 2024-02-19 RX ORDER — TRAMADOL HYDROCHLORIDE 50 MG/1
50 TABLET ORAL EVERY 6 HOURS PRN
Qty: 120 TABLET | Refills: 0 | Status: SHIPPED | OUTPATIENT
Start: 2024-02-19 | End: 2024-03-20

## 2024-02-19 RX ORDER — INSULIN LISPRO 100 [IU]/ML
INJECTION, SOLUTION INTRAVENOUS; SUBCUTANEOUS
Qty: 15 ML | Refills: 3 | Status: SHIPPED | OUTPATIENT
Start: 2024-02-19

## 2024-02-19 RX ORDER — LIRAGLUTIDE 6 MG/ML
INJECTION SUBCUTANEOUS
Qty: 9 ML | Refills: 3 | Status: SHIPPED | OUTPATIENT
Start: 2024-02-19

## 2024-02-19 NOTE — TELEPHONE ENCOUNTER
Patient called requesting insulin refills.  In addition he states he has generalized intractable pain.  He has an MRI this week.  Will check results and visit him to follow up.  Tramadol reordered -though he states it does not hrlp that well with pain any more.

## 2024-02-22 ENCOUNTER — HOSPITAL ENCOUNTER (OUTPATIENT)
Dept: RADIOLOGY | Facility: HOSPITAL | Age: 64
Discharge: HOME | End: 2024-02-22
Attending: ORTHOPAEDIC SURGERY
Payer: COMMERCIAL

## 2024-02-22 DIAGNOSIS — M54.12 RADICULOPATHY, CERVICAL REGION: ICD-10-CM

## 2024-02-22 DIAGNOSIS — R29.898 WEAKNESS OF LEFT ARM: ICD-10-CM

## 2024-02-22 PROCEDURE — G1004 CDSM NDSC: HCPCS

## 2024-02-22 PROCEDURE — 72141 MRI NECK SPINE W/O DYE: CPT

## 2024-02-26 ENCOUNTER — TELEPHONE (OUTPATIENT)
Age: 64
End: 2024-02-26

## 2024-02-26 NOTE — TELEPHONE ENCOUNTER
Caller: Alonzo     Doctor:      Reason for call: Patient calling stating if MRI results were in I did advise patient results are not in yet and it does take 3 business days not including weekends patient understood     Call back#: [phone]

## 2024-02-27 ENCOUNTER — HOSPITAL ENCOUNTER (EMERGENCY)
Facility: HOSPITAL | Age: 64
Discharge: HOME | End: 2024-02-27
Attending: EMERGENCY MEDICINE
Payer: COMMERCIAL

## 2024-02-27 VITALS
DIASTOLIC BLOOD PRESSURE: 78 MMHG | TEMPERATURE: 97.6 F | HEART RATE: 70 BPM | RESPIRATION RATE: 18 BRPM | SYSTOLIC BLOOD PRESSURE: 131 MMHG | BODY MASS INDEX: 33.21 KG/M2 | WEIGHT: 238.1 LBS | OXYGEN SATURATION: 98 %

## 2024-02-27 DIAGNOSIS — E11.9 DIABETES MELLITUS (HCC): ICD-10-CM

## 2024-02-27 DIAGNOSIS — R73.9 HYPERGLYCEMIA: Primary | ICD-10-CM

## 2024-02-27 LAB
ANION GAP SERPL CALCULATED.3IONS-SCNC: 11 MMOL/L
ARTERIAL PATENCY WRIST A: YES
BASE EX.OXY STD BLDV CALC-SCNC: 49.4 % (ref 60–80)
BASE EXCESS BLDV CALC-SCNC: -1.1 MMOL/L
BASOPHILS # BLD MANUAL: 0 THOUSAND/UL (ref 0–0.1)
BASOPHILS NFR MAR MANUAL: 0 % (ref 0–1)
BETA-HYDROXYBUTYRATE: 0.1 MMOL/L
BODY TEMPERATURE: 97.6 DEGREES FEHRENHEIT
BUN SERPL-MCNC: 11 MG/DL (ref 5–25)
CALCIUM SERPL-MCNC: 9.5 MG/DL (ref 8.4–10.2)
CHLORIDE SERPL-SCNC: 95 MMOL/L (ref 96–108)
CO2 SERPL-SCNC: 26 MMOL/L (ref 21–32)
CREAT SERPL-MCNC: 0.95 MG/DL (ref 0.6–1.3)
EOSINOPHIL # BLD MANUAL: 0 THOUSAND/UL (ref 0–0.4)
EOSINOPHIL NFR BLD MANUAL: 0 % (ref 0–6)
ERYTHROCYTE [DISTWIDTH] IN BLOOD BY AUTOMATED COUNT: 12.6 % (ref 11.6–15.1)
GFR SERPL CREATININE-BSD FRML MDRD: 84 ML/MIN/1.73SQ M
GLUCOSE SERPL-MCNC: 147 MG/DL (ref 65–140)
GLUCOSE SERPL-MCNC: 343 MG/DL (ref 65–140)
GLUCOSE SERPL-MCNC: 400 MG/DL (ref 65–140)
HCO3 BLDV-SCNC: 25 MMOL/L (ref 24–30)
HCT VFR BLD AUTO: 44.3 % (ref 36.5–49.3)
HGB BLD-MCNC: 15.6 G/DL (ref 12–17)
LYMPHOCYTES # BLD AUTO: 14.42 THOUSAND/UL (ref 0.6–4.47)
LYMPHOCYTES # BLD AUTO: 66 % (ref 14–44)
MCH RBC QN AUTO: 32.2 PG (ref 26.8–34.3)
MCHC RBC AUTO-ENTMCNC: 35.2 G/DL (ref 31.4–37.4)
MCV RBC AUTO: 91 FL (ref 82–98)
MONOCYTES # BLD AUTO: 1.35 THOUSAND/UL (ref 0–1.22)
MONOCYTES NFR BLD: 7 % (ref 4–12)
NASAL CANNULA: 3
NEUTROPHILS # BLD MANUAL: 3.46 THOUSAND/UL (ref 1.85–7.62)
NEUTS SEG NFR BLD AUTO: 18 % (ref 43–75)
O2 CT BLDV-SCNC: 10.4 ML/DL
PCO2 BLDV: 47.1 MM HG (ref 42–50)
PH BLDV: 7.34 [PH] (ref 7.3–7.4)
PLATELET # BLD AUTO: 146 THOUSANDS/UL (ref 149–390)
PLATELET BLD QL SMEAR: ABNORMAL
PMV BLD AUTO: 9.3 FL (ref 8.9–12.7)
PO2 BLDV: 26.3 MM HG (ref 35–45)
POTASSIUM SERPL-SCNC: 3.6 MMOL/L (ref 3.5–5.3)
RBC # BLD AUTO: 4.85 MILLION/UL (ref 3.88–5.62)
RBC MORPH BLD: NORMAL
SMUDGE CELLS BLD QL SMEAR: PRESENT
SODIUM SERPL-SCNC: 132 MMOL/L (ref 135–147)
VARIANT LYMPHS # BLD AUTO: 9 %
WBC # BLD AUTO: 19.23 THOUSAND/UL (ref 4.31–10.16)

## 2024-02-27 PROCEDURE — 82805 BLOOD GASES W/O2 SATURATION: CPT | Performed by: EMERGENCY MEDICINE

## 2024-02-27 PROCEDURE — 99284 EMERGENCY DEPT VISIT MOD MDM: CPT | Performed by: EMERGENCY MEDICINE

## 2024-02-27 PROCEDURE — 82948 REAGENT STRIP/BLOOD GLUCOSE: CPT

## 2024-02-27 PROCEDURE — 99285 EMERGENCY DEPT VISIT HI MDM: CPT

## 2024-02-27 PROCEDURE — 36415 COLL VENOUS BLD VENIPUNCTURE: CPT | Performed by: EMERGENCY MEDICINE

## 2024-02-27 PROCEDURE — 85007 BL SMEAR W/DIFF WBC COUNT: CPT | Performed by: EMERGENCY MEDICINE

## 2024-02-27 PROCEDURE — 96361 HYDRATE IV INFUSION ADD-ON: CPT

## 2024-02-27 PROCEDURE — 80048 BASIC METABOLIC PNL TOTAL CA: CPT | Performed by: EMERGENCY MEDICINE

## 2024-02-27 PROCEDURE — 85027 COMPLETE CBC AUTOMATED: CPT | Performed by: EMERGENCY MEDICINE

## 2024-02-27 PROCEDURE — 96374 THER/PROPH/DIAG INJ IV PUSH: CPT

## 2024-02-27 PROCEDURE — 82010 KETONE BODYS QUAN: CPT | Performed by: EMERGENCY MEDICINE

## 2024-02-27 RX ORDER — ONDANSETRON 2 MG/ML
4 INJECTION INTRAMUSCULAR; INTRAVENOUS ONCE
Status: COMPLETED | OUTPATIENT
Start: 2024-02-27 | End: 2024-02-27

## 2024-02-27 RX ADMIN — ONDANSETRON 4 MG: 2 INJECTION INTRAMUSCULAR; INTRAVENOUS at 06:47

## 2024-02-27 RX ADMIN — SODIUM CHLORIDE 1000 ML: 0.9 INJECTION, SOLUTION INTRAVENOUS at 06:45
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
## 2024-02-27 NOTE — DISCHARGE INSTRUCTIONS
Your labs were otherwise unremarkable.  Your sugar here was 343.  Please keep a good track of your sugars over the rest of the today.  Please follow-up with your primary care doctor.    Return to ER if you develop any chest pain or shortness of breath or any new or worrisome symptoms.

## 2024-02-28 ENCOUNTER — VBI (OUTPATIENT)
Dept: FAMILY MEDICINE CLINIC | Facility: CLINIC | Age: 64
End: 2024-02-28

## 2024-02-28 ENCOUNTER — TELEPHONE (OUTPATIENT)
Dept: OBGYN CLINIC | Facility: CLINIC | Age: 64
End: 2024-02-28

## 2024-02-28 DIAGNOSIS — M25.512 ACUTE PAIN OF LEFT SHOULDER: Primary | ICD-10-CM

## 2024-02-28 NOTE — TELEPHONE ENCOUNTER
Patient called back and provided the # for central scheduling. He is going to call and get the MRI of the left shoulder done.

## 2024-02-28 NOTE — TELEPHONE ENCOUNTER
Vladimirm for pt to cb and discuss.    Dr. Dinh also ordered a Left shoulder MRI. Please provide #  for central scheduling   ----- Message from Reji Dinh DO sent at 2/28/2024  1:00 PM EST -----  Alonzo had an MRI of the cervical spine.  This showed no significant disc herniation or narrowing in his spine.  The weakness in his arm may be coming from his shoulder and not his neck.  I am going to order an MRI of his left shoulder at this time like we discussed at the last office visit.  If he would like to follow-up with me and discuss other options we can certainly see him.

## 2024-02-28 NOTE — TELEPHONE ENCOUNTER
02/28/24 9:27 AM    Patient contacted post ED visit, [facility] department spoke with patient/caregiver and outreach was successful.    Thank you.  Kim Castorena  [facility]

## 2024-03-01 ENCOUNTER — TELEPHONE (OUTPATIENT)
Dept: HEMATOLOGY ONCOLOGY | Facility: MEDICAL CENTER | Age: 64
End: 2024-03-01

## 2024-03-01 DIAGNOSIS — J41.0 SIMPLE CHRONIC BRONCHITIS (HCC): Primary | Chronic | ICD-10-CM

## 2024-03-01 RX ORDER — DOXYCYCLINE HYCLATE 100 MG/1
100 CAPSULE ORAL EVERY 12 HOURS SCHEDULED
Qty: 14 CAPSULE | Refills: 0 | Status: SHIPPED | OUTPATIENT
Start: 2024-03-01 | End: 2024-03-08

## 2024-03-01 RX ORDER — PREDNISONE 10 MG/1
40 TABLET ORAL DAILY
Qty: 16 TABLET | Refills: 0 | Status: SHIPPED | OUTPATIENT
Start: 2024-03-01 | End: 2024-03-05

## 2024-03-01 NOTE — PROGRESS NOTES
Patient called c/o feeling ill and congested.  Wheeze heardon phone.  ABT and prednisone ordered.  Will do home visit Monday.

## 2024-03-01 NOTE — TELEPHONE ENCOUNTER
Spoke with patient regarding labs needed to be drawn prior to appointment.  Indicated the scripts are in the system, they are non-fasting and patient can go to any [facility] facility to have the labs drawn.  Patient indicated unable to obtain prior to appointment, would prefer to reschedule until 04/08/2024 at 12:40pm.  
Abdomen soft, non-tender, no guarding.

## 2024-03-04 ENCOUNTER — PATIENT OUTREACH (OUTPATIENT)
Age: 64
End: 2024-03-04

## 2024-03-04 ENCOUNTER — IN HOME VISIT (OUTPATIENT)
Age: 64
End: 2024-03-04

## 2024-03-04 ENCOUNTER — APPOINTMENT (OUTPATIENT)
Dept: LAB | Facility: CLINIC | Age: 64
End: 2024-03-04
Payer: COMMERCIAL

## 2024-03-04 DIAGNOSIS — R16.2 HEPATOSPLENOMEGALY: ICD-10-CM

## 2024-03-04 DIAGNOSIS — C91.10 CLL (CHRONIC LYMPHOCYTIC LEUKEMIA) (HCC): ICD-10-CM

## 2024-03-04 DIAGNOSIS — Z79.4 TYPE 2 DIABETES MELLITUS WITH COMPLICATION, WITH LONG-TERM CURRENT USE OF INSULIN (HCC): ICD-10-CM

## 2024-03-04 DIAGNOSIS — I50.32 CHRONIC DIASTOLIC CONGESTIVE HEART FAILURE (HCC): ICD-10-CM

## 2024-03-04 DIAGNOSIS — J18.9 PNEUMONIA OF RIGHT LOWER LOBE DUE TO INFECTIOUS ORGANISM: Primary | ICD-10-CM

## 2024-03-04 DIAGNOSIS — Z00.00 ROUTINE MEDICAL EXAM: ICD-10-CM

## 2024-03-04 DIAGNOSIS — E78.2 MIXED HYPERLIPIDEMIA: ICD-10-CM

## 2024-03-04 DIAGNOSIS — E11.8 TYPE 2 DIABETES MELLITUS WITH COMPLICATION, WITH LONG-TERM CURRENT USE OF INSULIN (HCC): ICD-10-CM

## 2024-03-04 DIAGNOSIS — D84.9 IMMUNODEFICIENCY, UNSPECIFIED (HCC): ICD-10-CM

## 2024-03-04 DIAGNOSIS — I48.20 CHRONIC A-FIB (HCC): ICD-10-CM

## 2024-03-04 DIAGNOSIS — J98.4 LUNG DISEASE, RESTRICTIVE: ICD-10-CM

## 2024-03-04 DIAGNOSIS — D53.9 NUTRITIONAL ANEMIA, UNSPECIFIED: ICD-10-CM

## 2024-03-04 DIAGNOSIS — R16.2 HEPATOSPLENOMEGALY: Primary | ICD-10-CM

## 2024-03-04 DIAGNOSIS — F99 PSYCHIATRIC DISORDER: ICD-10-CM

## 2024-03-04 PROBLEM — I13.0 HYPERTENSIVE HEART AND CHRONIC KIDNEY DISEASE WITH HEART FAILURE AND STAGE 1 THROUGH STAGE 4 CHRONIC KIDNEY DISEASE, OR UNSPECIFIED CHRONIC KIDNEY DISEASE (HCC): Status: RESOLVED | Noted: 2021-11-28 | Resolved: 2024-03-04

## 2024-03-04 LAB
ALBUMIN SERPL BCP-MCNC: 4.1 G/DL (ref 3.5–5)
ALP SERPL-CCNC: 79 U/L (ref 34–104)
ALT SERPL W P-5'-P-CCNC: 18 U/L (ref 7–52)
ANION GAP SERPL CALCULATED.3IONS-SCNC: 13 MMOL/L
AST SERPL W P-5'-P-CCNC: 16 U/L (ref 13–39)
BILIRUB SERPL-MCNC: 0.41 MG/DL (ref 0.2–1)
BUN SERPL-MCNC: 23 MG/DL (ref 5–25)
CALCIUM SERPL-MCNC: 9.4 MG/DL (ref 8.4–10.2)
CHLORIDE SERPL-SCNC: 92 MMOL/L (ref 96–108)
CHOLEST SERPL-MCNC: 146 MG/DL
CO2 SERPL-SCNC: 25 MMOL/L (ref 21–32)
CREAT SERPL-MCNC: 0.78 MG/DL (ref 0.6–1.3)
EST. AVERAGE GLUCOSE BLD GHB EST-MCNC: 232 MG/DL
GFR SERPL CREATININE-BSD FRML MDRD: 95 ML/MIN/1.73SQ M
GLUCOSE P FAST SERPL-MCNC: 344 MG/DL (ref 65–99)
HBA1C MFR BLD: 9.7 %
HDLC SERPL-MCNC: 31 MG/DL
MAGNESIUM SERPL-MCNC: 1.7 MG/DL (ref 1.9–2.7)
POTASSIUM SERPL-SCNC: 4 MMOL/L (ref 3.5–5.3)
PROT SERPL-MCNC: 6.4 G/DL (ref 6.4–8.4)
PSA SERPL-MCNC: 2.65 NG/ML (ref 0–4)
SODIUM SERPL-SCNC: 130 MMOL/L (ref 135–147)
TRIGL SERPL-MCNC: 720 MG/DL
TSH SERPL DL<=0.05 MIU/L-ACNC: 1.29 UIU/ML (ref 0.45–4.5)

## 2024-03-04 PROCEDURE — G0439 PPPS, SUBSEQ VISIT: HCPCS | Performed by: NURSE PRACTITIONER

## 2024-03-04 PROCEDURE — 80053 COMPREHEN METABOLIC PANEL: CPT

## 2024-03-04 PROCEDURE — G0103 PSA SCREENING: HCPCS

## 2024-03-04 PROCEDURE — 84443 ASSAY THYROID STIM HORMONE: CPT

## 2024-03-04 PROCEDURE — 83735 ASSAY OF MAGNESIUM: CPT

## 2024-03-04 PROCEDURE — 83721 ASSAY OF BLOOD LIPOPROTEIN: CPT

## 2024-03-04 PROCEDURE — 83036 HEMOGLOBIN GLYCOSYLATED A1C: CPT

## 2024-03-04 PROCEDURE — 80061 LIPID PANEL: CPT

## 2024-03-04 NOTE — PROGRESS NOTES
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 Mood normal.         Behavior: Behavior normal.         Thought Content: Thought content normal.         Judgment: Judgment normal.          Tuesday MANAS [facility]

## 2024-03-04 NOTE — PROGRESS NOTES
Received call from Tuesday Taryn MALAGON. Tuesday is currently with patient doing a home visit.     Tuesday would like labs ordered. Lab orders placed.     No additional needs.

## 2024-03-05 LAB — LDLC SERPL DIRECT ASSAY-MCNC: 50 MG/DL (ref 0–100)

## 2024-03-08 DIAGNOSIS — Z79.4 TYPE 2 DIABETES MELLITUS WITH HYPERGLYCEMIA, WITH LONG-TERM CURRENT USE OF INSULIN (HCC): ICD-10-CM

## 2024-03-08 DIAGNOSIS — Z79.4 TYPE 2 DIABETES MELLITUS WITH OTHER SPECIFIED COMPLICATION, WITH LONG-TERM CURRENT USE OF INSULIN (HCC): ICD-10-CM

## 2024-03-08 DIAGNOSIS — E11.8 TYPE 2 DIABETES MELLITUS WITH COMPLICATION, WITH LONG-TERM CURRENT USE OF INSULIN (HCC): ICD-10-CM

## 2024-03-08 DIAGNOSIS — E11.65 TYPE 2 DIABETES MELLITUS WITH HYPERGLYCEMIA, WITH LONG-TERM CURRENT USE OF INSULIN (HCC): ICD-10-CM

## 2024-03-08 DIAGNOSIS — E11.69 TYPE 2 DIABETES MELLITUS WITH OTHER SPECIFIED COMPLICATION, WITH LONG-TERM CURRENT USE OF INSULIN (HCC): ICD-10-CM

## 2024-03-08 DIAGNOSIS — Z79.4 TYPE 2 DIABETES MELLITUS WITH COMPLICATION, WITH LONG-TERM CURRENT USE OF INSULIN (HCC): ICD-10-CM

## 2024-03-08 DIAGNOSIS — E11.65 UNCONTROLLED TYPE 2 DIABETES MELLITUS WITH HYPERGLYCEMIA (HCC): ICD-10-CM

## 2024-03-08 RX ORDER — INSULIN LISPRO 100 [IU]/ML
15 INJECTION, SOLUTION INTRAVENOUS; SUBCUTANEOUS
Qty: 13.5 ML | Refills: 0 | Status: SHIPPED | OUTPATIENT
Start: 2024-03-08 | End: 2024-04-07

## 2024-03-08 RX ORDER — UBIQUINOL 100 MG
CAPSULE ORAL 4 TIMES DAILY
Qty: 100 EACH | Refills: 0 | Status: SHIPPED | OUTPATIENT
Start: 2024-03-08

## 2024-03-08 RX ORDER — BLOOD-GLUCOSE METER
EACH MISCELLANEOUS 4 TIMES DAILY
Qty: 1 KIT | Refills: 0 | Status: SHIPPED | OUTPATIENT
Start: 2024-03-08 | End: 2024-04-07

## 2024-03-08 RX ORDER — LIRAGLUTIDE 6 MG/ML
1.8 INJECTION SUBCUTANEOUS DAILY
Qty: 9 ML | Refills: 3 | Status: SHIPPED | OUTPATIENT
Start: 2024-03-08 | End: 2024-04-07

## 2024-03-08 RX ORDER — INSULIN GLARGINE 100 [IU]/ML
50 INJECTION, SOLUTION SUBCUTANEOUS 2 TIMES DAILY
Qty: 50 ML | Refills: 3 | Status: SHIPPED | OUTPATIENT
Start: 2024-03-08 | End: 2024-04-07

## 2024-03-12 DIAGNOSIS — E11.8 TYPE 2 DIABETES MELLITUS WITH COMPLICATION, WITH LONG-TERM CURRENT USE OF INSULIN (HCC): ICD-10-CM

## 2024-03-12 DIAGNOSIS — Z79.4 TYPE 2 DIABETES MELLITUS WITH COMPLICATION, WITH LONG-TERM CURRENT USE OF INSULIN (HCC): ICD-10-CM

## 2024-03-12 NOTE — ASSESSMENT & PLAN NOTE
Patient presents with fever, cough, SOB  CT chest - Right basilar consolidation with air bronchograms  Left basilar relaxation atelectasis  Flu/RSV negative  · Continue Rocephin 2 gm IV daily (weight based), day #7  · Resume Azithromycin for right axillary abscess coverage, day #3  · Continue Atrovent/Xopenex TID, Xopenex PRN, Mucinex  · Patient needs aggressive mucus clearance with acapella valve + IS 10x/hr while awake    · Pulmonary following, appreciate input  · Continues with hemoptysis  Discontinue Eliquis and aspirin  , patient may need a bronchoscopy on Monday  · Repeat CXRs have shown persistent PNA PAST MEDICAL HISTORY:  Arthritis     HTN (Hypertension)     Neurofibromatosis     Persistent atrial fibrillation

## 2024-03-14 ENCOUNTER — TELEPHONE (OUTPATIENT)
Age: 64
End: 2024-03-14

## 2024-03-14 PROBLEM — Z12.11 COLON CANCER SCREENING: Status: ACTIVE | Noted: 2021-02-28

## 2024-03-14 PROBLEM — Z86.010 HX OF ADENOMATOUS COLONIC POLYPS: Status: ACTIVE | Noted: 2024-03-14

## 2024-03-14 PROBLEM — K58.0 IRRITABLE BOWEL SYNDROME WITH DIARRHEA: Status: ACTIVE | Noted: 2024-03-14

## 2024-03-14 PROBLEM — Z86.0101 HX OF ADENOMATOUS COLONIC POLYPS: Status: ACTIVE | Noted: 2024-03-14

## 2024-03-14 PROBLEM — K31.84 GASTROPARESIS: Status: ACTIVE | Noted: 2024-03-14

## 2024-03-14 NOTE — TELEPHONE ENCOUNTER
Fax received from [employer]. Printed last visit note regarding diabetes. Attached to form. Placed in Tuesday folder to complete.

## 2024-03-15 NOTE — TELEPHONE ENCOUNTER
Completed form has been faxed to the number listed below copy has been attached to this encounter.       [phone]

## 2024-03-21 ENCOUNTER — TELEPHONE (OUTPATIENT)
Dept: HEMATOLOGY ONCOLOGY | Facility: CLINIC | Age: 64
End: 2024-03-21

## 2024-03-21 DIAGNOSIS — C91.11 CHRONIC LYMPHOCYTIC LEUKEMIA OF B-CELL TYPE IN REMISSION (HCC): Primary | ICD-10-CM

## 2024-03-21 NOTE — TELEPHONE ENCOUNTER
Lab Inquiry   Who are you speaking with? Patient     If it is not the patient, are they listed on an active communication consent form? N/A   Name of ordering provider Dr. Watson   What is being requested? Requesting clarification - Are labs needed for upcoming appointment on 4/8/24   Lab draw location [facility]   What is the best call back number? [phone]   If patient at the lab, Was a live attempts to contact the team made?

## 2024-03-22 ENCOUNTER — TELEPHONE (OUTPATIENT)
Age: 64
End: 2024-03-22

## 2024-03-22 DIAGNOSIS — R52 SEVERE PAIN: ICD-10-CM

## 2024-03-22 DIAGNOSIS — J42 CHRONIC BRONCHITIS, UNSPECIFIED CHRONIC BRONCHITIS TYPE (HCC): ICD-10-CM

## 2024-03-22 RX ORDER — BENZONATATE 100 MG/1
100 CAPSULE ORAL 3 TIMES DAILY PRN
Qty: 90 CAPSULE | Refills: 6 | Status: SHIPPED | OUTPATIENT
Start: 2024-03-22 | End: 2024-04-21

## 2024-03-22 RX ORDER — TRAMADOL HYDROCHLORIDE 50 MG/1
50 TABLET ORAL EVERY 6 HOURS PRN
Qty: 120 TABLET | Refills: 0 | Status: SHIPPED | OUTPATIENT
Start: 2024-03-22 | End: 2024-04-21

## 2024-03-22 NOTE — PROGRESS NOTES
Spoke with Alonzo.  Reviewed medications and discontinued those not necessary at this time.  Discussed his weight loss which is beneficial for him at this time.  His blood sugars are improving.  He is not eating as much as he used to.  Tramadol effective for pain so refilled.

## 2024-03-27 NOTE — TELEPHONE ENCOUNTER
"Completed form has been faxed to the number listed below; and placed in \"TO BE SCANNED BIN\"     [phone]  "

## 2024-03-28 DIAGNOSIS — E11.8 TYPE 2 DIABETES MELLITUS WITH COMPLICATION, WITH LONG-TERM CURRENT USE OF INSULIN (HCC): ICD-10-CM

## 2024-03-28 DIAGNOSIS — I50.32 CHRONIC DIASTOLIC (CONGESTIVE) HEART FAILURE (HCC): ICD-10-CM

## 2024-03-28 DIAGNOSIS — E78.5 HYPERLIPIDEMIA, UNSPECIFIED HYPERLIPIDEMIA TYPE: ICD-10-CM

## 2024-03-28 DIAGNOSIS — Z79.4 TYPE 2 DIABETES MELLITUS WITH COMPLICATION, WITH LONG-TERM CURRENT USE OF INSULIN (HCC): ICD-10-CM

## 2024-03-28 DIAGNOSIS — R79.89 LOW SERUM LOW DENSITY LIPOPROTEIN (LDL): ICD-10-CM

## 2024-03-28 DIAGNOSIS — I50.22 CHRONIC SYSTOLIC HEART FAILURE (HCC): ICD-10-CM

## 2024-03-28 DIAGNOSIS — I25.10 CORONARY ARTERY DISEASE INVOLVING NATIVE HEART WITHOUT ANGINA PECTORIS, UNSPECIFIED VESSEL OR LESION TYPE: ICD-10-CM

## 2024-03-28 RX ORDER — SPIRONOLACTONE 25 MG/1
25 TABLET ORAL DAILY
Qty: 90 TABLET | Refills: 1 | Status: SHIPPED | OUTPATIENT
Start: 2024-03-28

## 2024-03-28 RX ORDER — LOSARTAN POTASSIUM 50 MG/1
50 TABLET ORAL DAILY
Qty: 90 TABLET | Refills: 3 | Status: SHIPPED | OUTPATIENT
Start: 2024-03-28 | End: 2024-04-27

## 2024-03-28 RX ORDER — ATORVASTATIN CALCIUM 10 MG/1
10 TABLET, FILM COATED ORAL DAILY
Qty: 30 TABLET | Refills: 3 | Status: SHIPPED | OUTPATIENT
Start: 2024-03-28 | End: 2024-04-27

## 2024-03-28 RX ORDER — METOPROLOL SUCCINATE 200 MG/1
200 TABLET, EXTENDED RELEASE ORAL DAILY
Qty: 90 TABLET | Refills: 3 | Status: SHIPPED | OUTPATIENT
Start: 2024-03-28 | End: 2032-07-14

## 2024-04-02 ENCOUNTER — APPOINTMENT (OUTPATIENT)
Dept: LAB | Facility: CLINIC | Age: 64
End: 2024-04-02
Payer: COMMERCIAL

## 2024-04-02 DIAGNOSIS — D84.9 IMMUNODEFICIENCY (HCC): ICD-10-CM

## 2024-04-02 DIAGNOSIS — I50.9 CHRONIC HEART FAILURE, UNSPECIFIED HEART FAILURE TYPE (HCC): ICD-10-CM

## 2024-04-02 DIAGNOSIS — R16.2 HEPATOSPLENOMEGALY: Primary | ICD-10-CM

## 2024-04-02 DIAGNOSIS — J98.4 LUNG DISEASE, RESTRICTIVE: ICD-10-CM

## 2024-04-02 DIAGNOSIS — E11.8 TYPE 2 DIABETES MELLITUS WITH COMPLICATION (HCC): ICD-10-CM

## 2024-04-02 LAB
ALBUMIN SERPL BCP-MCNC: 4.4 G/DL (ref 3.5–5)
ALP SERPL-CCNC: 82 U/L (ref 34–104)
ALT SERPL W P-5'-P-CCNC: 15 U/L (ref 7–52)
ANION GAP SERPL CALCULATED.3IONS-SCNC: 14 MMOL/L (ref 4–13)
AST SERPL W P-5'-P-CCNC: 14 U/L (ref 13–39)
BILIRUB SERPL-MCNC: 0.71 MG/DL (ref 0.2–1)
BUN SERPL-MCNC: 15 MG/DL (ref 5–25)
CALCIUM SERPL-MCNC: 9 MG/DL (ref 8.4–10.2)
CHLORIDE SERPL-SCNC: 97 MMOL/L (ref 96–108)
CHOLEST SERPL-MCNC: 96 MG/DL
CO2 SERPL-SCNC: 26 MMOL/L (ref 21–32)
CREAT SERPL-MCNC: 0.84 MG/DL (ref 0.6–1.3)
DIGOXIN SERPL-MCNC: 0.6 NG/ML (ref 0.8–2)
EST. AVERAGE GLUCOSE BLD GHB EST-MCNC: 232 MG/DL
GFR SERPL CREATININE-BSD FRML MDRD: 92 ML/MIN/1.73SQ M
GLUCOSE SERPL-MCNC: 307 MG/DL (ref 65–140)
HBA1C MFR BLD: 9.7 %
POTASSIUM SERPL-SCNC: 4 MMOL/L (ref 3.5–5.3)
PROT SERPL-MCNC: 6.8 G/DL (ref 6.4–8.4)
SODIUM SERPL-SCNC: 137 MMOL/L (ref 135–147)

## 2024-04-02 PROCEDURE — 85027 COMPLETE CBC AUTOMATED: CPT

## 2024-04-02 PROCEDURE — 80053 COMPREHEN METABOLIC PANEL: CPT

## 2024-04-02 PROCEDURE — 85007 BL SMEAR W/DIFF WBC COUNT: CPT

## 2024-04-02 PROCEDURE — 82465 ASSAY BLD/SERUM CHOLESTEROL: CPT

## 2024-04-02 PROCEDURE — 80162 ASSAY OF DIGOXIN TOTAL: CPT

## 2024-04-02 PROCEDURE — 83036 HEMOGLOBIN GLYCOSYLATED A1C: CPT

## 2024-04-02 PROCEDURE — 36415 COLL VENOUS BLD VENIPUNCTURE: CPT

## 2024-04-03 LAB
ANISOCYTOSIS BLD QL SMEAR: PRESENT
BASOPHILS # BLD MANUAL: 0 THOUSAND/UL (ref 0–0.1)
BASOPHILS NFR MAR MANUAL: 0 % (ref 0–1)
DIFFERENTIAL COMMENT: ABNORMAL
EOSINOPHIL # BLD MANUAL: 0 THOUSAND/UL (ref 0–0.4)
EOSINOPHIL NFR BLD MANUAL: 0 % (ref 0–6)
ERYTHROCYTE [DISTWIDTH] IN BLOOD BY AUTOMATED COUNT: 13.2 % (ref 11.6–15.1)
HCT VFR BLD AUTO: 38.9 % (ref 36.5–49.3)
HGB BLD-MCNC: 12.9 G/DL (ref 12–17)
LYMPHOCYTES # BLD AUTO: 14.27 THOUSAND/UL (ref 0.6–4.47)
LYMPHOCYTES # BLD AUTO: 64 % (ref 14–44)
MACROCYTES BLD QL AUTO: PRESENT
MCH RBC QN AUTO: 31.7 PG (ref 26.8–34.3)
MCHC RBC AUTO-ENTMCNC: 33.2 G/DL (ref 31.4–37.4)
MCV RBC AUTO: 96 FL (ref 82–98)
MONOCYTES # BLD AUTO: 0.2 THOUSAND/UL (ref 0–1.22)
MONOCYTES NFR BLD: 1 % (ref 4–12)
NEUTROPHILS # BLD MANUAL: 5.35 THOUSAND/UL (ref 1.85–7.62)
NEUTS BAND NFR BLD MANUAL: 6 % (ref 0–8)
NEUTS SEG NFR BLD AUTO: 21 % (ref 43–75)
PLATELET # BLD AUTO: 169 THOUSANDS/UL (ref 149–390)
PLATELET BLD QL SMEAR: ADEQUATE
PMV BLD AUTO: 10.2 FL (ref 8.9–12.7)
POLYCHROMASIA BLD QL SMEAR: PRESENT
RBC # BLD AUTO: 4.07 MILLION/UL (ref 3.88–5.62)
RBC MORPH BLD: PRESENT
SMUDGE CELLS BLD QL SMEAR: PRESENT
VARIANT LYMPHS # BLD AUTO: 8 %
WBC # BLD AUTO: 19.82 THOUSAND/UL (ref 4.31–10.16)

## 2024-04-04 DIAGNOSIS — E11.65 UNCONTROLLED TYPE 2 DIABETES MELLITUS WITH HYPERGLYCEMIA (HCC): ICD-10-CM

## 2024-04-04 DIAGNOSIS — I50.9 CHRONIC CONGESTIVE HEART FAILURE, UNSPECIFIED HEART FAILURE TYPE (HCC): ICD-10-CM

## 2024-04-04 RX ORDER — BUMETANIDE 1 MG/1
1 TABLET ORAL DAILY
Qty: 30 TABLET | Refills: 3 | Status: SHIPPED | OUTPATIENT
Start: 2024-04-04 | End: 2024-05-04

## 2024-04-04 RX ORDER — INSULIN GLARGINE 100 [IU]/ML
60 INJECTION, SOLUTION SUBCUTANEOUS 2 TIMES DAILY
Qty: 50 ML | Refills: 3 | Status: SHIPPED | OUTPATIENT
Start: 2024-04-04

## 2024-04-04 NOTE — PROGRESS NOTES
Spoke to Alonzo regarding his labs.  He is urinating a lot and has not been taking Bumex.  Increased lantus and educated on diabetic diet.  Reordered bumex once daily.

## 2024-04-08 ENCOUNTER — OFFICE VISIT (OUTPATIENT)
Dept: HEMATOLOGY ONCOLOGY | Facility: MEDICAL CENTER | Age: 64
End: 2024-04-08
Payer: COMMERCIAL

## 2024-04-08 ENCOUNTER — TELEPHONE (OUTPATIENT)
Dept: HEMATOLOGY ONCOLOGY | Facility: MEDICAL CENTER | Age: 64
End: 2024-04-08

## 2024-04-08 VITALS
RESPIRATION RATE: 17 BRPM | BODY MASS INDEX: 31.92 KG/M2 | HEART RATE: 99 BPM | HEIGHT: 71 IN | TEMPERATURE: 97.1 F | SYSTOLIC BLOOD PRESSURE: 132 MMHG | WEIGHT: 228 LBS | OXYGEN SATURATION: 97 % | DIASTOLIC BLOOD PRESSURE: 80 MMHG

## 2024-04-08 DIAGNOSIS — D80.3 IGG DEFICIENCY (HCC): ICD-10-CM

## 2024-04-08 DIAGNOSIS — R53.83 FATIGUE, UNSPECIFIED TYPE: ICD-10-CM

## 2024-04-08 DIAGNOSIS — C91.11 CHRONIC LYMPHOCYTIC LEUKEMIA OF B-CELL TYPE IN REMISSION (HCC): Primary | ICD-10-CM

## 2024-04-08 DIAGNOSIS — J42 CHRONIC BRONCHITIS, UNSPECIFIED CHRONIC BRONCHITIS TYPE (HCC): ICD-10-CM

## 2024-04-08 PROCEDURE — 99214 OFFICE O/P EST MOD 30 MIN: CPT | Performed by: PHYSICIAN ASSISTANT

## 2024-04-08 RX ORDER — SODIUM CHLORIDE 9 MG/ML
20 INJECTION, SOLUTION INTRAVENOUS ONCE
Status: CANCELLED | OUTPATIENT
Start: 2024-04-15

## 2024-04-08 RX ORDER — DIPHENHYDRAMINE HCL 25 MG
50 TABLET ORAL ONCE
Status: CANCELLED | OUTPATIENT
Start: 2024-04-15

## 2024-04-08 RX ORDER — ACETAMINOPHEN 325 MG/1
650 TABLET ORAL ONCE
Status: CANCELLED | OUTPATIENT
Start: 2024-04-15
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
## 2024-04-09 ENCOUNTER — TELEPHONE (OUTPATIENT)
Dept: HEMATOLOGY ONCOLOGY | Facility: CLINIC | Age: 64
End: 2024-04-09

## 2024-04-09 DIAGNOSIS — R53.83 FATIGUE, UNSPECIFIED TYPE: ICD-10-CM

## 2024-04-09 DIAGNOSIS — C91.11 CHRONIC LYMPHOCYTIC LEUKEMIA OF B-CELL TYPE IN REMISSION (HCC): ICD-10-CM

## 2024-04-09 DIAGNOSIS — C91.11 CHRONIC LYMPHOCYTIC LEUKEMIA OF B-CELL TYPE IN REMISSION (HCC): Primary | ICD-10-CM

## 2024-04-09 DIAGNOSIS — D80.3 IGG DEFICIENCY (HCC): Primary | ICD-10-CM

## 2024-04-09 RX ORDER — SODIUM CHLORIDE 9 MG/ML
20 INJECTION, SOLUTION INTRAVENOUS ONCE
OUTPATIENT
Start: 2024-04-23

## 2024-04-09 RX ORDER — DIPHENHYDRAMINE HCL 25 MG
50 TABLET ORAL ONCE
OUTPATIENT
Start: 2024-04-23

## 2024-04-09 RX ORDER — ACETAMINOPHEN 325 MG/1
650 TABLET ORAL ONCE
OUTPATIENT
Start: 2024-04-23

## 2024-04-09 NOTE — TELEPHONE ENCOUNTER
Patient will do lab work this week   Will send to infusion schedulers to postpone treatment one week  Patient aware of plan

## 2024-04-09 NOTE — TELEPHONE ENCOUNTER
Aida Boykin sent to Anusha Oro RN  Good morning,    Can you please have the  patient obtain updated IgG/IgM/IgA labs as soon as possible? Patients last labs resulted 10/24/2023 and insurance requires a result within last 30 days.    Orders placed  Mailbox full  Will re attempt call

## 2024-04-15 ENCOUNTER — APPOINTMENT (OUTPATIENT)
Dept: LAB | Facility: CLINIC | Age: 64
End: 2024-04-15
Payer: COMMERCIAL

## 2024-04-15 ENCOUNTER — IN HOME VISIT (OUTPATIENT)
Age: 64
End: 2024-04-15

## 2024-04-15 DIAGNOSIS — E11.8 TYPE 2 DIABETES MELLITUS WITH COMPLICATION, WITH LONG-TERM CURRENT USE OF INSULIN (HCC): ICD-10-CM

## 2024-04-15 DIAGNOSIS — D80.3: Primary | ICD-10-CM

## 2024-04-15 DIAGNOSIS — Z79.4 TYPE 2 DIABETES MELLITUS WITH COMPLICATION, WITH LONG-TERM CURRENT USE OF INSULIN (HCC): ICD-10-CM

## 2024-04-15 DIAGNOSIS — G89.29 CHRONIC INTRACTABLE PAIN: Primary | ICD-10-CM

## 2024-04-15 DIAGNOSIS — C91.11 CHRONIC LYMPHOCYTIC LEUKEMIA OF B-CELL TYPE IN REMISSION (HCC): ICD-10-CM

## 2024-04-15 DIAGNOSIS — C91.10 CLL (CHRONIC LYMPHOCYTIC LEUKEMIA) (HCC): ICD-10-CM

## 2024-04-15 LAB
ALBUMIN SERPL BCP-MCNC: 4.1 G/DL (ref 3.5–5)
ALP SERPL-CCNC: 59 U/L (ref 34–104)
ALT SERPL W P-5'-P-CCNC: 16 U/L (ref 7–52)
ANION GAP SERPL CALCULATED.3IONS-SCNC: 10 MMOL/L (ref 4–13)
AST SERPL W P-5'-P-CCNC: 17 U/L (ref 13–39)
BASOPHILS # BLD AUTO: 0.05 THOUSANDS/ÂΜL (ref 0–0.1)
BASOPHILS NFR BLD AUTO: 0 % (ref 0–1)
BILIRUB SERPL-MCNC: 0.64 MG/DL (ref 0.2–1)
BUN SERPL-MCNC: 14 MG/DL (ref 5–25)
CALCIUM SERPL-MCNC: 9.4 MG/DL (ref 8.4–10.2)
CHLORIDE SERPL-SCNC: 96 MMOL/L (ref 96–108)
CO2 SERPL-SCNC: 30 MMOL/L (ref 21–32)
CREAT SERPL-MCNC: 0.75 MG/DL (ref 0.6–1.3)
EOSINOPHIL # BLD AUTO: 0.06 THOUSAND/ÂΜL (ref 0–0.61)
EOSINOPHIL NFR BLD AUTO: 0 % (ref 0–6)
ERYTHROCYTE [DISTWIDTH] IN BLOOD BY AUTOMATED COUNT: 13 % (ref 11.6–15.1)
GFR SERPL CREATININE-BSD FRML MDRD: 96 ML/MIN/1.73SQ M
GLUCOSE SERPL-MCNC: 252 MG/DL (ref 65–140)
HCT VFR BLD AUTO: 42.8 % (ref 36.5–49.3)
HGB BLD-MCNC: 14.4 G/DL (ref 12–17)
IGA SERPL-MCNC: 131 MG/DL (ref 66–433)
IGG SERPL-MCNC: 689 MG/DL (ref 635–1741)
IGM SERPL-MCNC: 79 MG/DL (ref 45–281)
IMM GRANULOCYTES # BLD AUTO: 0.04 THOUSAND/UL (ref 0–0.2)
IMM GRANULOCYTES NFR BLD AUTO: 0 % (ref 0–2)
LYMPHOCYTES # BLD AUTO: 15.54 THOUSANDS/ÂΜL (ref 0.6–4.47)
LYMPHOCYTES NFR BLD AUTO: 80 % (ref 14–44)
MCH RBC QN AUTO: 31.6 PG (ref 26.8–34.3)
MCHC RBC AUTO-ENTMCNC: 33.6 G/DL (ref 31.4–37.4)
MCV RBC AUTO: 94 FL (ref 82–98)
MONOCYTES # BLD AUTO: 0.55 THOUSAND/ÂΜL (ref 0.17–1.22)
MONOCYTES NFR BLD AUTO: 3 % (ref 4–12)
NEUTROPHILS # BLD AUTO: 3.43 THOUSANDS/ÂΜL (ref 1.85–7.62)
NEUTS SEG NFR BLD AUTO: 17 % (ref 43–75)
NRBC BLD AUTO-RTO: 0 /100 WBCS
PLATELET # BLD AUTO: 178 THOUSANDS/UL (ref 149–390)
PMV BLD AUTO: 10.4 FL (ref 8.9–12.7)
POTASSIUM SERPL-SCNC: 4.4 MMOL/L (ref 3.5–5.3)
PROT SERPL-MCNC: 6.6 G/DL (ref 6.4–8.4)
RBC # BLD AUTO: 4.56 MILLION/UL (ref 3.88–5.62)
SODIUM SERPL-SCNC: 136 MMOL/L (ref 135–147)
WBC # BLD AUTO: 19.67 THOUSAND/UL (ref 4.31–10.16)

## 2024-04-15 PROCEDURE — 36415 COLL VENOUS BLD VENIPUNCTURE: CPT

## 2024-04-15 PROCEDURE — 82784 ASSAY IGA/IGD/IGG/IGM EACH: CPT

## 2024-04-15 PROCEDURE — 85025 COMPLETE CBC W/AUTO DIFF WBC: CPT

## 2024-04-15 PROCEDURE — 80053 COMPREHEN METABOLIC PANEL: CPT

## 2024-04-15 RX ORDER — TRAMADOL HYDROCHLORIDE 50 MG/1
100 TABLET ORAL EVERY 8 HOURS PRN
Qty: 180 TABLET | Refills: 0 | Status: SHIPPED | OUTPATIENT
Start: 2024-04-15 | End: 2024-04-16 | Stop reason: SDUPTHER

## 2024-04-15 RX ORDER — TRAMADOL HYDROCHLORIDE 50 MG/1
100 TABLET ORAL EVERY 8 HOURS PRN
Qty: 90 TABLET | Refills: 0 | Status: CANCELLED | OUTPATIENT
Start: 2024-04-15

## 2024-04-15 NOTE — PROGRESS NOTES
Name: Alonzo Watson      : 1960      MRN: [id number]  Encounter Provider: MANAS Payne  Encounter Date: 4/15/2024   Encounter department: [facility]    Assessment & Plan     1. Chronic intractable pain      - Tramadol increased to 100mg TID PRN.    2. Type 2 diabetes mellitus with complication, with long-term current use of insulin (HCC)      - Continue to monitor and log blood sugars.      - Educated on proper diabetic foot care and food/drink options compliant for diabetics.    3. CLL (chronic lymphocytic leukemia) (HCC)      - He is regularly following up with hem/onc and is due for infusions.      BMI Counseling: There is no height or weight on file to calculate BMI. The BMI is above normal. Nutrition recommendations include limiting drinks that contain sugar and moderation in carbohydrate intake. Exercise recommendations include exercising 3-5 times per week. Rationale for BMI follow-up plan is due to patient being overweight or obese.         Subjective      Patient seen in home today. He is homebound due to dyspnea with short distances, poor ambulation tolerance, and poor immune system. Patient reports continued generalized pain. He has been taking Tramadol as prescribed with temporary relief of pain. Plan to increase pain medication as patient does not desire to leave home for pain management evaluation. Patient reports he met with palliative care through  and he was not a candidate for this service. Patient also expresses concern as he is not able to do activities that he used to do. However, he has been staying busy with various hobbies such as rock collecting. He denies SI/HI. He states he will refer back to previous psychiatrist for additional support. He also reports intermittent left toe pain. He states the toenail is tender with palpation. No redness, drainage, bleeding. Ambulates at baseline. He also reports his blood sugars have not been  over 225. Provided education regarding diabetic foot care and food/drink options for diabetics. Patient is on 3L oxygen via nasal cannula PRN with CPAP for sleep. > 40 minutes spent with patient on detailed history and physical exam and assessment, planning, diagnosing, education, and obtaining blood work.      Review of Systems   Constitutional: Negative.    HENT: Negative.     Eyes: Negative.    Respiratory: Negative.     Cardiovascular: Negative.    Gastrointestinal: Negative.    Endocrine: Negative.    Genitourinary: Negative.    Musculoskeletal: Negative.    Skin: Negative.    Allergic/Immunologic: Negative.    Neurological: Negative.    Hematological: Negative.    Psychiatric/Behavioral: Negative.         Current Outpatient Medications on File Prior to Visit   Medication Sig    acetaminophen (TYLENOL) 325 mg tablet Take 2 tablets (650 mg total) by mouth every 6 (six) hours as needed for mild pain, headaches or fever    albuterol (2.5 mg/3 mL) 0.083 % nebulizer solution USE 1 VIAL (2.5 MG TOTAL) BY NEBULIZATION EVERY 6 HOURS AS NEEDED FOR WHEEZING    Alcohol Swabs (Alcohol Prep) 70 % PADS Apply topically 4 (four) times a day As directed    apixaban (Eliquis) 5 mg Take 1 tablet (5 mg total) by mouth 2 (two) times a day    Aspercreme Lidocaine 4 % CREA Apply 4 % topically Left shoulder    atorvastatin (LIPITOR) 10 mg tablet Take 1 tablet (10 mg total) by mouth daily    B-D ULTRAFINE III SHORT PEN 31G X 8 MM MISC Use as directed    benzonatate (TESSALON PERLES) 100 mg capsule Take 1 capsule (100 mg total) by mouth 3 (three) times a day as needed for cough    Blood Glucose Monitoring Suppl (OneTouch Verio Flex System) w/Device KIT Inject under the skin 4 (four) times a day    bumetanide (BUMEX) 1 mg tablet Take 1 tablet (1 mg total) by mouth daily    busPIRone (BUSPAR) 10 mg tablet TAKE ONE TABLET BY MOUTH TWICE DAILY    [place] Choice Comfort EZ 33G X 4 MM MISC USE TO INJECT INSULIN 5 TIMES A DAY    Continuous  Blood Gluc Sensor (FreeStyle [place] 14 Day Sensor) MISC Use 1 application. every 14 (fourteen) days    Continuous Blood Gluc Sensor (FreeStyle Sheba 14 Day Sensor) MISC Use as directed-    Continuous Blood Gluc Sensor (FreeStyle Sheba 2 Sensor) MISC CHECK BLOOD SUGARS MULTIPLE TIMES DAILY    Diclofenac Sodium (VOLTAREN) 1 % Apply 2 g topically 4 (four) times a day for 14 days    digoxin (LANOXIN) 0.25 mg tablet Take 1 tablet (250 mcg total) by mouth daily    docusate sodium (COLACE) 100 mg capsule Take 1 capsule (100 mg total) by mouth 2 (two) times a day    fluticasone-umeclidinium-vilanterol (TRELEGY) 100-62.5-25 MCG/INH inhaler Inhale 1 puff daily Rinse mouth after use.    gabapentin (Neurontin) 800 mg tablet Take 1 tablet (800 mg total) by mouth 4 (four) times a day    glucose blood (ReliOn True Metrix Test Strips) test strip Use as instructed    Insulin Glargine Solostar (Lantus SoloStar) 100 UNIT/ML SOPN Inject 0.6 mL (60 Units total) under the skin 2 (two) times a day    insulin lispro (HumaLOG KwikPen) 100 units/mL injection pen Inject 15 Units under the skin 3 (three) times a day with meals    insulin lispro (HumALOG/ADMELOG) 100 units/mL injection Inject 15 Units under the skin 3 (three) times a day before meals    Insulin Pen Needle ([place] Choice Comfort EZ) 33G X 4 MM MISC USE TO INJECT INSULIN 5 TIMES A DAY    Insulin Pen Needle 31G X 5 MM MISC Inject under the skin 4 (four) times a day    ipratropium-albuterol (DUO-NEB) 0.5-2.5 mg/3 mL nebulizer solution Take 3 mL by nebulization every 6 (six) hours as needed for wheezing or shortness of breath    lamoTRIgine (LaMICtal) 25 mg tablet 25 mg daily at bedtime    Lancet Devices (Adjustable Lancing Device) MISC USE AS DIRECTED    Lancets (onetouch ultrasoft) lancets test blood sugar 3 (three) times a day    lidocaine (LMX) 4 % cream Apply topically as needed for mild pain    losartan (COZAAR) 50 mg tablet Take 1 tablet (50 mg total) by mouth daily     metoprolol succinate (TOPROL-XL) 200 MG 24 hr tablet Take 1 tablet (200 mg total) by mouth daily    Multiple Vitamins-Minerals (CENTRUM SILVER 50+MEN PO) Take by mouth    QUEtiapine (SEROquel) 100 mg tablet Take 1 tablet (100 mg total) by mouth daily at bedtime (Patient taking differently: Take 100 mg by mouth every morning)    QUEtiapine (SEROquel) 300 mg tablet Take 1 tablet (300 mg total) by mouth daily at bedtime    sertraline (ZOLOFT) 50 mg tablet Take 1 tablet (50 mg total) by mouth daily Daily at bedtime    sodium chloride 1 g tablet Take 1 tablet (1 g total) by mouth every morning    spironolactone (ALDACTONE) 25 mg tablet TAKE 1 TABLET BY MOUTH DAILY    tiZANidine (ZANAFLEX) 4 mg tablet Take 1 tablet (4 mg total) by mouth every 8 (eight) hours as needed for muscle spasms    Unifine SafeControl Pen Needle 30G X 5 MM MISC     Ventolin  (90 Base) MCG/ACT inhaler INHALE 2 PUFFS EVERY 6 (SIX) HOURS AS NEEDED FOR WHEEZING    [DISCONTINUED] traMADol (ULTRAM) 50 mg tablet Take 1 tablet (50 mg total) by mouth every 6 (six) hours as needed for moderate pain       Objective     There were no vitals taken for this visit.    Physical Exam  Constitutional:       General: He is not in acute distress.     Appearance: Normal appearance. He is not ill-appearing, toxic-appearing or diaphoretic.   HENT:      Head: Normocephalic and atraumatic.      Right Ear: External ear normal.      Left Ear: External ear normal.      Nose: Nose normal.      Mouth/Throat:      Mouth: Mucous membranes are moist.   Eyes:      General:         Right eye: No discharge.         Left eye: No discharge.      Conjunctiva/sclera: Conjunctivae normal.   Cardiovascular:      Rate and Rhythm: Normal rate and regular rhythm.      Pulses: no weak pulses.      Heart sounds: Normal heart sounds. No murmur heard.     No friction rub. No gallop.   Pulmonary:      Effort: Pulmonary effort is normal. No respiratory distress.      Breath sounds: Normal  breath sounds. No wheezing, rhonchi or rales.   Abdominal:      General: Bowel sounds are normal. There is no distension.      Palpations: Abdomen is soft.      Tenderness: There is no abdominal tenderness. There is no guarding.   Musculoskeletal:         General: Normal range of motion.      Cervical back: Normal range of motion. No rigidity or tenderness.   Feet:      Right foot:      Skin integrity: No ulcer, skin breakdown, erythema, warmth, callus or dry skin.      Left foot:      Skin integrity: No ulcer, skin breakdown, erythema, warmth, callus or dry skin.   Lymphadenopathy:      Cervical: No cervical adenopathy.   Skin:     General: Skin is warm.   Neurological:      Mental Status: He is alert and oriented to person, place, and time. Mental status is at baseline.   Psychiatric:         Mood and Affect: Mood normal.         Behavior: Behavior normal.         Thought Content: Thought content normal.         Judgment: Judgment normal.       Tuesday [place], CRNP    Diabetic Foot Exam    Patient's shoes and socks removed.    Right Foot/Ankle   Right Foot Inspection  Skin Exam: skin normal and skin intact. No dry skin, no warmth, no callus, no erythema, no maceration, no abnormal color, no pre-ulcer, no ulcer and no callus.     Toe Exam: ROM and strength within normal limits.     Sensory   Vibration: intact  Proprioception: intact  Monofilament testing: intact    Left Foot/Ankle  Left Foot Inspection  Skin Exam: skin normal and skin intact. No dry skin, no warmth, no erythema, no maceration, normal color, no pre-ulcer, no ulcer and no callus.     Toe Exam: tenderness. No swelling, no erythema and no left toe deformity.     Sensory   Vibration: intact  Proprioception: intact  Monofilament testing: intact    Assign Risk Category  No deformity present  No loss of protective sensation  No weak pulses  Risk: 0

## 2024-04-16 DIAGNOSIS — G89.29 CHRONIC INTRACTABLE PAIN: ICD-10-CM

## 2024-04-16 DIAGNOSIS — I48.91 ATRIAL FIBRILLATION WITH RVR (HCC): ICD-10-CM

## 2024-04-16 DIAGNOSIS — C91.10 CLL (CHRONIC LYMPHOCYTIC LEUKEMIA) (HCC): ICD-10-CM

## 2024-04-16 RX ORDER — TRAMADOL HYDROCHLORIDE 50 MG/1
100 TABLET ORAL EVERY 8 HOURS PRN
Qty: 180 TABLET | Refills: 0 | Status: SHIPPED | OUTPATIENT
Start: 2024-04-16 | End: 2024-05-16

## 2024-04-17 DIAGNOSIS — Z79.4 TYPE 2 DIABETES MELLITUS WITH COMPLICATION, WITH LONG-TERM CURRENT USE OF INSULIN (HCC): ICD-10-CM

## 2024-04-17 DIAGNOSIS — E11.65 TYPE 2 DIABETES MELLITUS WITH HYPERGLYCEMIA, WITH LONG-TERM CURRENT USE OF INSULIN (HCC): ICD-10-CM

## 2024-04-17 DIAGNOSIS — Z79.4 TYPE 2 DIABETES MELLITUS WITH HYPERGLYCEMIA, WITH LONG-TERM CURRENT USE OF INSULIN (HCC): ICD-10-CM

## 2024-04-17 DIAGNOSIS — E11.8 TYPE 2 DIABETES MELLITUS WITH COMPLICATION, WITH LONG-TERM CURRENT USE OF INSULIN (HCC): ICD-10-CM

## 2024-04-17 RX ORDER — DIGOXIN 250 MCG
250 TABLET ORAL DAILY
Qty: 90 TABLET | Refills: 1 | Status: SHIPPED | OUTPATIENT
Start: 2024-04-17

## 2024-04-19 ENCOUNTER — TELEPHONE (OUTPATIENT)
Dept: INFUSION CENTER | Facility: HOSPITAL | Age: 64
End: 2024-04-19

## 2024-04-22 ENCOUNTER — TELEPHONE (OUTPATIENT)
Dept: HEMATOLOGY ONCOLOGY | Facility: MEDICAL CENTER | Age: 64
End: 2024-04-22

## 2024-04-22 DIAGNOSIS — D80.3 IGG DEFICIENCY (HCC): Primary | ICD-10-CM

## 2024-04-22 DIAGNOSIS — R53.83 FATIGUE, UNSPECIFIED TYPE: ICD-10-CM

## 2024-04-22 DIAGNOSIS — C91.11 CHRONIC LYMPHOCYTIC LEUKEMIA OF B-CELL TYPE IN REMISSION (HCC): ICD-10-CM

## 2024-04-22 DIAGNOSIS — M79.18 MYOFASCIAL PAIN SYNDROME: ICD-10-CM

## 2024-04-22 RX ORDER — DIPHENHYDRAMINE HCL 25 MG
50 TABLET ORAL ONCE
Status: CANCELLED | OUTPATIENT
Start: 2024-05-03

## 2024-04-22 RX ORDER — ACETAMINOPHEN 325 MG/1
650 TABLET ORAL ONCE
Status: CANCELLED | OUTPATIENT
Start: 2024-05-03

## 2024-04-22 RX ORDER — SODIUM CHLORIDE 9 MG/ML
20 INJECTION, SOLUTION INTRAVENOUS ONCE
Status: CANCELLED | OUTPATIENT
Start: 2024-05-03

## 2024-04-22 NOTE — TELEPHONE ENCOUNTER
Per finance:    Patients IVIG appointment for tomorrow will unfortunately have to be rescheduled. Authorization is still pending with his insurance. I have called to expedite it as well but we still do nit have a response.    Aida Boykin CMA ([facility])   Precertification Specialist  Network Prior Authorization Dept      Will send to  to postpone treatment for one week    Patient aware of plan

## 2024-04-23 ENCOUNTER — HOSPITAL ENCOUNTER (OUTPATIENT)
Dept: INFUSION CENTER | Facility: HOSPITAL | Age: 64
Discharge: HOME | End: 2024-04-23
Attending: INTERNAL MEDICINE

## 2024-04-29 ENCOUNTER — TELEPHONE (OUTPATIENT)
Dept: HEMATOLOGY ONCOLOGY | Facility: CLINIC | Age: 64
End: 2024-04-29

## 2024-04-29 ENCOUNTER — TELEPHONE (OUTPATIENT)
Age: 64
End: 2024-04-29

## 2024-04-29 DIAGNOSIS — E11.8 TYPE 2 DIABETES MELLITUS WITH COMPLICATION, WITH LONG-TERM CURRENT USE OF INSULIN (HCC): ICD-10-CM

## 2024-04-29 DIAGNOSIS — R79.89 LOW SERUM LOW DENSITY LIPOPROTEIN (LDL): ICD-10-CM

## 2024-04-29 DIAGNOSIS — E87.1 HYPONATREMIA: ICD-10-CM

## 2024-04-29 DIAGNOSIS — E78.5 HYPERLIPIDEMIA, UNSPECIFIED HYPERLIPIDEMIA TYPE: ICD-10-CM

## 2024-04-29 DIAGNOSIS — Z79.4 TYPE 2 DIABETES MELLITUS WITH COMPLICATION, WITH LONG-TERM CURRENT USE OF INSULIN (HCC): ICD-10-CM

## 2024-04-29 RX ORDER — ATORVASTATIN CALCIUM 10 MG/1
10 TABLET, FILM COATED ORAL DAILY
Qty: 30 TABLET | Refills: 0 | Status: SHIPPED | OUTPATIENT
Start: 2024-04-29 | End: 2024-05-29

## 2024-04-29 RX ORDER — SODIUM CHLORIDE 1 G/1
1 TABLET ORAL EVERY MORNING
Qty: 30 TABLET | Refills: 0 | Status: SHIPPED | OUTPATIENT
Start: 2024-04-29

## 2024-04-29 RX ORDER — TIZANIDINE 4 MG/1
4 TABLET ORAL EVERY 8 HOURS PRN
Qty: 90 TABLET | Refills: 3 | Status: SHIPPED | OUTPATIENT
Start: 2024-04-29 | End: 2024-05-29

## 2024-04-29 RX ORDER — UBIQUINOL 100 MG
CAPSULE ORAL 4 TIMES DAILY
Qty: 100 EACH | Refills: 0 | Status: SHIPPED | OUTPATIENT
Start: 2024-04-29 | End: 2024-05-03 | Stop reason: SDUPTHER

## 2024-04-29 NOTE — TELEPHONE ENCOUNTER
Patient Call    Who are you speaking with? Patient    If it is not the patient, are they listed on an active communication consent form? Yes   What is the reason for this call? Has insurance approved infusions?   Does this require a call back? Yes   If a call back is required, please list best call back number [phone]    If a call back is required, advise that a message will be forwarded to their care team and someone will return their call as soon as possible.   Did you relay this information to the patient? Yes

## 2024-04-29 NOTE — TELEPHONE ENCOUNTER
Elva from [employer] calling asking for the chart notes from 7/27/23 and the 6MW test done that day to be faxed to [phone] Attn: Elva SMITH Please advise.

## 2024-04-30 ENCOUNTER — TELEPHONE (OUTPATIENT)
Dept: INFUSION CENTER | Facility: HOSPITAL | Age: 64
End: 2024-04-30

## 2024-04-30 DIAGNOSIS — D80.3 IGG DEFICIENCY (HCC): Primary | ICD-10-CM

## 2024-04-30 DIAGNOSIS — C91.11 CHRONIC LYMPHOCYTIC LEUKEMIA OF B-CELL TYPE IN REMISSION (HCC): ICD-10-CM

## 2024-04-30 DIAGNOSIS — R53.83 FATIGUE, UNSPECIFIED TYPE: ICD-10-CM

## 2024-04-30 DIAGNOSIS — E11.8 TYPE 2 DIABETES MELLITUS WITH COMPLICATION, WITH LONG-TERM CURRENT USE OF INSULIN (HCC): ICD-10-CM

## 2024-04-30 DIAGNOSIS — Z79.4 TYPE 2 DIABETES MELLITUS WITH COMPLICATION, WITH LONG-TERM CURRENT USE OF INSULIN (HCC): ICD-10-CM

## 2024-04-30 RX ORDER — ACETAMINOPHEN 325 MG/1
650 TABLET ORAL ONCE
OUTPATIENT
Start: 2024-05-14

## 2024-04-30 RX ORDER — DIPHENHYDRAMINE HCL 25 MG
50 TABLET ORAL ONCE
OUTPATIENT
Start: 2024-05-14

## 2024-04-30 RX ORDER — SODIUM CHLORIDE 9 MG/ML
20 INJECTION, SOLUTION INTRAVENOUS ONCE
OUTPATIENT
Start: 2024-05-14

## 2024-05-01 PROBLEM — J18.9 PNEUMONIA OF RIGHT LOWER LOBE DUE TO INFECTIOUS ORGANISM: Status: RESOLVED | Noted: 2020-04-11 | Resolved: 2024-05-01

## 2024-05-01 PROBLEM — Z12.11 COLON CANCER SCREENING: Status: RESOLVED | Noted: 2021-02-28 | Resolved: 2024-05-01

## 2024-05-01 NOTE — TELEPHONE ENCOUNTER
IGG level 689  IVIG not approved   Dr Watson aware  IVIG order discontinued  Patient aware  Will cancel all infusion appts for IVIG

## 2024-05-03 DIAGNOSIS — Z79.4 TYPE 2 DIABETES MELLITUS WITH COMPLICATION, WITH LONG-TERM CURRENT USE OF INSULIN (HCC): ICD-10-CM

## 2024-05-03 DIAGNOSIS — E11.8 TYPE 2 DIABETES MELLITUS WITH COMPLICATION, WITH LONG-TERM CURRENT USE OF INSULIN (HCC): ICD-10-CM

## 2024-05-03 DIAGNOSIS — Z79.4 TYPE 2 DIABETES MELLITUS WITH HYPERGLYCEMIA, WITH LONG-TERM CURRENT USE OF INSULIN (HCC): ICD-10-CM

## 2024-05-03 DIAGNOSIS — G62.9 PERIPHERAL POLYNEUROPATHY: ICD-10-CM

## 2024-05-03 DIAGNOSIS — E11.65 TYPE 2 DIABETES MELLITUS WITH HYPERGLYCEMIA, WITH LONG-TERM CURRENT USE OF INSULIN (HCC): ICD-10-CM

## 2024-05-03 DIAGNOSIS — I48.91 ATRIAL FIBRILLATION WITH RVR (HCC): ICD-10-CM

## 2024-05-03 RX ORDER — GABAPENTIN 800 MG/1
800 TABLET ORAL 4 TIMES DAILY
Qty: 120 TABLET | Refills: 3 | Status: SHIPPED | OUTPATIENT
Start: 2024-05-03

## 2024-05-03 RX ORDER — UBIQUINOL 100 MG
CAPSULE ORAL 4 TIMES DAILY
Qty: 100 EACH | Refills: 0 | Status: SHIPPED | OUTPATIENT
Start: 2024-05-03

## 2024-05-12 LAB

## 2024-05-14 DIAGNOSIS — G89.29 CHRONIC INTRACTABLE PAIN: ICD-10-CM

## 2024-05-14 DIAGNOSIS — C91.10 CLL (CHRONIC LYMPHOCYTIC LEUKEMIA) (HCC): ICD-10-CM

## 2024-05-14 DIAGNOSIS — M79.18 MYOFASCIAL PAIN SYNDROME: ICD-10-CM

## 2024-05-14 RX ORDER — TRAMADOL HYDROCHLORIDE 50 MG/1
100 TABLET ORAL EVERY 8 HOURS PRN
Qty: 180 TABLET | Refills: 0 | Status: SHIPPED | OUTPATIENT
Start: 2024-05-14 | End: 2024-06-13

## 2024-05-14 RX ORDER — TIZANIDINE 4 MG/1
4 TABLET ORAL EVERY 8 HOURS PRN
Qty: 90 TABLET | Refills: 0 | Status: SHIPPED | OUTPATIENT
Start: 2024-05-14 | End: 2024-06-13

## 2024-05-16 ENCOUNTER — TELEPHONE (OUTPATIENT)
Age: 64
End: 2024-05-16

## 2024-05-16 NOTE — TELEPHONE ENCOUNTER
Fax received from [employer]  Detailed Written Order  Scanned into encounter   Placed in pcps folder  Fax: [phone]

## 2024-05-20 ENCOUNTER — APPOINTMENT (EMERGENCY)
Dept: RADIOLOGY | Facility: HOSPITAL | Age: 64
End: 2024-05-20
Payer: COMMERCIAL

## 2024-05-20 ENCOUNTER — HOSPITAL ENCOUNTER (EMERGENCY)
Facility: HOSPITAL | Age: 64
Discharge: HOME | End: 2024-05-20
Attending: EMERGENCY MEDICINE
Payer: COMMERCIAL

## 2024-05-20 ENCOUNTER — TELEPHONE (OUTPATIENT)
Age: 64
End: 2024-05-20

## 2024-05-20 VITALS
SYSTOLIC BLOOD PRESSURE: 156 MMHG | HEART RATE: 73 BPM | DIASTOLIC BLOOD PRESSURE: 95 MMHG | RESPIRATION RATE: 18 BRPM | TEMPERATURE: 99.8 F | OXYGEN SATURATION: 97 %

## 2024-05-20 DIAGNOSIS — R60.9 INTERSTITIAL EDEMA: ICD-10-CM

## 2024-05-20 DIAGNOSIS — Z86.59 HISTORY OF BIPOLAR DISORDER: ICD-10-CM

## 2024-05-20 DIAGNOSIS — R07.9 CHEST PAIN, UNSPECIFIED: Primary | ICD-10-CM

## 2024-05-20 DIAGNOSIS — J44.1 COPD WITH ACUTE EXACERBATION (HCC): ICD-10-CM

## 2024-05-20 DIAGNOSIS — I50.9 CHF (CONGESTIVE HEART FAILURE) (HCC): ICD-10-CM

## 2024-05-20 LAB
2HR DELTA HS TROPONIN: 0 NG/L
ALBUMIN SERPL BCP-MCNC: 4.1 G/DL (ref 3.5–5)
ALP SERPL-CCNC: 54 U/L (ref 34–104)
ALT SERPL W P-5'-P-CCNC: 23 U/L (ref 7–52)
ANION GAP SERPL CALCULATED.3IONS-SCNC: 6 MMOL/L (ref 4–13)
AST SERPL W P-5'-P-CCNC: 25 U/L (ref 13–39)
ATRIAL RATE: 49 BPM
BASOPHILS # BLD MANUAL: 0 THOUSAND/UL (ref 0–0.1)
BASOPHILS NFR MAR MANUAL: 0 % (ref 0–1)
BILIRUB SERPL-MCNC: 0.55 MG/DL (ref 0.2–1)
BILIRUB UR QL STRIP: NEGATIVE
BNP SERPL-MCNC: 215 PG/ML (ref 0–100)
BUN SERPL-MCNC: 9 MG/DL (ref 5–25)
CALCIUM SERPL-MCNC: 9.1 MG/DL (ref 8.4–10.2)
CARDIAC TROPONIN I PNL SERPL HS: 8 NG/L
CARDIAC TROPONIN I PNL SERPL HS: 8 NG/L
CHLORIDE SERPL-SCNC: 97 MMOL/L (ref 96–108)
CLARITY UR: CLEAR
CO2 SERPL-SCNC: 30 MMOL/L (ref 21–32)
COLOR UR: COLORLESS
CREAT SERPL-MCNC: 0.69 MG/DL (ref 0.6–1.3)
DIGOXIN SERPL-MCNC: 1.5 NG/ML (ref 0.8–2)
EOSINOPHIL # BLD MANUAL: 0.2 THOUSAND/UL (ref 0–0.4)
EOSINOPHIL NFR BLD MANUAL: 1 % (ref 0–6)
ERYTHROCYTE [DISTWIDTH] IN BLOOD BY AUTOMATED COUNT: 12.4 % (ref 11.6–15.1)
FLUAV RNA RESP QL NAA+PROBE: NEGATIVE
FLUBV RNA RESP QL NAA+PROBE: NEGATIVE
GFR SERPL CREATININE-BSD FRML MDRD: 100 ML/MIN/1.73SQ M
GLUCOSE SERPL-MCNC: 384 MG/DL (ref 65–140)
GLUCOSE UR STRIP-MCNC: ABNORMAL MG/DL
HCT VFR BLD AUTO: 41.1 % (ref 36.5–49.3)
HGB BLD-MCNC: 13.9 G/DL (ref 12–17)
HGB UR QL STRIP.AUTO: NEGATIVE
KETONES UR STRIP-MCNC: NEGATIVE MG/DL
LACTATE SERPL-SCNC: 1.9 MMOL/L (ref 0.5–2)
LACTATE SERPL-SCNC: 2.1 MMOL/L (ref 0.5–2)
LEUKOCYTE ESTERASE UR QL STRIP: NEGATIVE
LYMPHOCYTES # BLD AUTO: 15.22 THOUSAND/UL (ref 0.6–4.47)
LYMPHOCYTES # BLD AUTO: 70 % (ref 14–44)
MAGNESIUM SERPL-MCNC: 1.8 MG/DL (ref 1.9–2.7)
MCH RBC QN AUTO: 31.3 PG (ref 26.8–34.3)
MCHC RBC AUTO-ENTMCNC: 33.8 G/DL (ref 31.4–37.4)
MCV RBC AUTO: 93 FL (ref 82–98)
MONOCYTES # BLD AUTO: 0.2 THOUSAND/UL (ref 0–1.22)
MONOCYTES NFR BLD: 1 % (ref 4–12)
NEUTROPHILS # BLD MANUAL: 4.15 THOUSAND/UL (ref 1.85–7.62)
NEUTS SEG NFR BLD AUTO: 21 % (ref 43–75)
NITRITE UR QL STRIP: NEGATIVE
PH UR STRIP.AUTO: 6.5 [PH]
PLATELET # BLD AUTO: 151 THOUSANDS/UL (ref 149–390)
PLATELET BLD QL SMEAR: ADEQUATE
PMV BLD AUTO: 9.9 FL (ref 8.9–12.7)
POTASSIUM SERPL-SCNC: 5 MMOL/L (ref 3.5–5.3)
PROCALCITONIN SERPL-MCNC: <0.05 NG/ML
PROT SERPL-MCNC: 6.7 G/DL (ref 6.4–8.4)
PROT UR STRIP-MCNC: NEGATIVE MG/DL
QRS AXIS: 75 DEGREES
QRSD INTERVAL: 98 MS
QT INTERVAL: 380 MS
QTC INTERVAL: 430 MS
RBC # BLD AUTO: 4.44 MILLION/UL (ref 3.88–5.62)
RBC MORPH BLD: NORMAL
RSV RNA RESP QL NAA+PROBE: NEGATIVE
SARS-COV-2 RNA RESP QL NAA+PROBE: NEGATIVE
SMUDGE CELLS BLD QL SMEAR: PRESENT
SODIUM SERPL-SCNC: 133 MMOL/L (ref 135–147)
SP GR UR STRIP.AUTO: <1.005 (ref 1–1.03)
T WAVE AXIS: 28 DEGREES
UROBILINOGEN UR STRIP-ACNC: <2 MG/DL
VARIANT LYMPHS # BLD AUTO: 7 %
VENTRICULAR RATE: 77 BPM
WBC # BLD AUTO: 19.77 THOUSAND/UL (ref 4.31–10.16)

## 2024-05-20 PROCEDURE — 93005 ELECTROCARDIOGRAM TRACING: CPT

## 2024-05-20 PROCEDURE — 83735 ASSAY OF MAGNESIUM: CPT | Performed by: EMERGENCY MEDICINE

## 2024-05-20 PROCEDURE — 85007 BL SMEAR W/DIFF WBC COUNT: CPT | Performed by: EMERGENCY MEDICINE

## 2024-05-20 PROCEDURE — 83605 ASSAY OF LACTIC ACID: CPT | Performed by: EMERGENCY MEDICINE

## 2024-05-20 PROCEDURE — 85027 COMPLETE CBC AUTOMATED: CPT | Performed by: EMERGENCY MEDICINE

## 2024-05-20 PROCEDURE — 71250 CT THORAX DX C-: CPT

## 2024-05-20 PROCEDURE — 99285 EMERGENCY DEPT VISIT HI MDM: CPT | Performed by: EMERGENCY MEDICINE

## 2024-05-20 PROCEDURE — 71045 X-RAY EXAM CHEST 1 VIEW: CPT

## 2024-05-20 PROCEDURE — 80053 COMPREHEN METABOLIC PANEL: CPT | Performed by: EMERGENCY MEDICINE

## 2024-05-20 PROCEDURE — 94640 AIRWAY INHALATION TREATMENT: CPT

## 2024-05-20 PROCEDURE — 87040 BLOOD CULTURE FOR BACTERIA: CPT | Performed by: EMERGENCY MEDICINE

## 2024-05-20 PROCEDURE — 84484 ASSAY OF TROPONIN QUANT: CPT | Performed by: EMERGENCY MEDICINE

## 2024-05-20 PROCEDURE — 83880 ASSAY OF NATRIURETIC PEPTIDE: CPT | Performed by: EMERGENCY MEDICINE

## 2024-05-20 PROCEDURE — 84145 PROCALCITONIN (PCT): CPT | Performed by: EMERGENCY MEDICINE

## 2024-05-20 PROCEDURE — 0241U HB NFCT DS VIR RESP RNA 4 TRGT: CPT | Performed by: EMERGENCY MEDICINE

## 2024-05-20 PROCEDURE — 80162 ASSAY OF DIGOXIN TOTAL: CPT | Performed by: EMERGENCY MEDICINE

## 2024-05-20 PROCEDURE — 36415 COLL VENOUS BLD VENIPUNCTURE: CPT | Performed by: EMERGENCY MEDICINE

## 2024-05-20 PROCEDURE — 96374 THER/PROPH/DIAG INJ IV PUSH: CPT

## 2024-05-20 PROCEDURE — 99285 EMERGENCY DEPT VISIT HI MDM: CPT

## 2024-05-20 RX ORDER — TRAMADOL HYDROCHLORIDE 50 MG/1
50 TABLET ORAL ONCE
Status: DISCONTINUED | OUTPATIENT
Start: 2024-05-20 | End: 2024-05-20

## 2024-05-20 RX ORDER — TRAMADOL HYDROCHLORIDE 50 MG/1
100 TABLET ORAL ONCE
Status: COMPLETED | OUTPATIENT
Start: 2024-05-20 | End: 2024-05-20

## 2024-05-20 RX ORDER — PREDNISONE 20 MG/1
20 TABLET ORAL 2 TIMES DAILY WITH MEALS
Qty: 15 TABLET | Refills: 0 | Status: SHIPPED | OUTPATIENT
Start: 2024-05-20 | End: 2024-05-28

## 2024-05-20 RX ORDER — IPRATROPIUM BROMIDE AND ALBUTEROL SULFATE 2.5; .5 MG/3ML; MG/3ML
3 SOLUTION RESPIRATORY (INHALATION) ONCE
Status: COMPLETED | OUTPATIENT
Start: 2024-05-20 | End: 2024-05-20

## 2024-05-20 RX ORDER — METHYLPREDNISOLONE SODIUM SUCCINATE 125 MG/2ML
80 INJECTION, POWDER, LYOPHILIZED, FOR SOLUTION INTRAMUSCULAR; INTRAVENOUS ONCE
Status: COMPLETED | OUTPATIENT
Start: 2024-05-20 | End: 2024-05-20

## 2024-05-20 RX ADMIN — METHYLPREDNISOLONE SODIUM SUCCINATE 80 MG: 125 INJECTION, POWDER, FOR SOLUTION INTRAMUSCULAR; INTRAVENOUS at 12:08

## 2024-05-20 RX ADMIN — TRAMADOL HYDROCHLORIDE 100 MG: 50 TABLET, COATED ORAL at 14:26

## 2024-05-20 RX ADMIN — IPRATROPIUM BROMIDE AND ALBUTEROL SULFATE 3 ML: 2.5; .5 SOLUTION RESPIRATORY (INHALATION) at 12:11

## 2024-05-20 NOTE — TELEPHONE ENCOUNTER
[facility] requesting O2 testing as well as office notes starting from 7/23. Fax [phone]

## 2024-05-20 NOTE — ED PROVIDER NOTES
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 1 day for follow up [address] [facility] [address]  [facility] [phone]       [facility] Pulmonology Schedule an appointment as soon as possible for a visit in 1 day for follow up [address] [facility] [address] [phone] [facility], [address], [place], [address] [phone]            Patient's Medications   Discharge Prescriptions    PREDNISONE 20 MG TABLET    Take 1 tablet (20 mg total) by mouth 2 (two) times a day with meals for 8 days       Start Date: 5/20/2024 End Date: 5/28/2024       Order Dose: 20 mg       Quantity: 15 tablet    Refills: 0       No discharge procedures on file.    PDMP Review         Value Time User    PDMP Reviewed  Yes 5/14/2024  3:20 PM [facility]            ED Provider  Electronically Signed by             Moshe Armas DO  05/20/24 1924

## 2024-05-20 NOTE — DISCHARGE INSTRUCTIONS
Return to the ER for further concerns or worsening symptoms  Follow up with your primary care physician in 1-2 days  Take medication as prescribed  Follow up with psychiatric resources as provided   Epidermal Sutures: 4-0 Nylon

## 2024-05-20 NOTE — ED NOTES
15:45 - briefly met with patient as requested by ER Attending - patient is NOT suicidal.  Patient was given outpt resources for therapy - already has tele-health for psychiatry.   ER Attending was then advised.

## 2024-05-20 NOTE — TELEPHONE ENCOUNTER
Patient contacted us with chest pains, and wanted to speak to Tuesday regarding this.  [phone]    Please advise.

## 2024-05-21 NOTE — TELEPHONE ENCOUNTER
Completed form has been faxed to the number listed below copy has been attached to this encounter.     [phone]

## 2024-05-24 LAB

## 2024-05-28 LAB
ATRIAL RATE: 49 BPM
BACTERIA BLD CULT: NORMAL
BACTERIA BLD CULT: NORMAL
QRS AXIS: 75 DEGREES
QRS AXIS: 89 DEGREES
QRSD INTERVAL: 90 MS
QRSD INTERVAL: 98 MS
QT INTERVAL: 380 MS
QT INTERVAL: 380 MS
QTC INTERVAL: 407 MS
QTC INTERVAL: 430 MS
T WAVE AXIS: 28 DEGREES
T WAVE AXIS: 54 DEGREES
VENTRICULAR RATE: 69 BPM
VENTRICULAR RATE: 77 BPM

## 2024-05-28 PROCEDURE — 93010 ELECTROCARDIOGRAM REPORT: CPT | Performed by: INTERNAL MEDICINE

## 2024-06-05 NOTE — NURSING NOTE
Received patient from [facility], [address] [place] [address]   Patient stable [Negative] : Heme/Lymph [FreeTextEntry9] : neck pain, lower back pain

## 2024-06-10 DIAGNOSIS — M79.18 MYOFASCIAL PAIN SYNDROME: ICD-10-CM

## 2024-06-10 DIAGNOSIS — C91.10 CLL (CHRONIC LYMPHOCYTIC LEUKEMIA) (HCC): ICD-10-CM

## 2024-06-10 DIAGNOSIS — G89.29 CHRONIC INTRACTABLE PAIN: ICD-10-CM

## 2024-06-11 RX ORDER — TIZANIDINE 4 MG/1
4 TABLET ORAL EVERY 8 HOURS PRN
Qty: 90 TABLET | Refills: 3 | Status: SHIPPED | OUTPATIENT
Start: 2024-06-11

## 2024-06-11 RX ORDER — TRAMADOL HYDROCHLORIDE 50 MG/1
100 TABLET ORAL EVERY 8 HOURS PRN
Qty: 180 TABLET | Refills: 0 | Status: SHIPPED | OUTPATIENT
Start: 2024-06-11 | End: 2024-07-11

## 2024-06-20 ENCOUNTER — TELEPHONE (OUTPATIENT)
Dept: HEMATOLOGY ONCOLOGY | Facility: CLINIC | Age: 64
End: 2024-06-20

## 2024-06-20 NOTE — TELEPHONE ENCOUNTER
Patient Call    Who are you speaking with? [employer] App[facility]     If it is not the patient, are they listed on an active communication consent form? N/A   What is the reason for this call? They need the last office note with O2 testing faxed to [phone]   Does this require a call back? N/A   If a call back is required, please list best call back number N/a   If a call back is required, advise that a message will be forwarded to their care team and someone will return their call as soon as possible.   Did you relay this information to the patient? N/A

## 2024-06-22 DIAGNOSIS — J18.9 COMMUNITY ACQUIRED PNEUMONIA: ICD-10-CM

## 2024-06-24 DIAGNOSIS — E87.1 HYPONATREMIA: ICD-10-CM

## 2024-06-24 DIAGNOSIS — M79.18 MYOFASCIAL PAIN SYNDROME: ICD-10-CM

## 2024-06-24 DIAGNOSIS — J43.2 CENTRILOBULAR EMPHYSEMA (HCC): ICD-10-CM

## 2024-06-24 RX ORDER — TIZANIDINE 4 MG/1
4 TABLET ORAL EVERY 8 HOURS PRN
Qty: 90 TABLET | Refills: 3 | Status: SHIPPED | OUTPATIENT
Start: 2024-06-24

## 2024-06-24 RX ORDER — SODIUM CHLORIDE 1 G/1
1 TABLET ORAL EVERY MORNING
Qty: 30 TABLET | Refills: 1 | Status: SHIPPED | OUTPATIENT
Start: 2024-06-24

## 2024-06-24 RX ORDER — ACETAMINOPHEN 325 MG/1
650 TABLET ORAL EVERY 6 HOURS PRN
Qty: 30 TABLET | Refills: 0 | Status: SHIPPED | OUTPATIENT
Start: 2024-06-24

## 2024-06-24 RX ORDER — FLUTICASONE FUROATE, UMECLIDINIUM BROMIDE AND VILANTEROL TRIFENATATE 100; 62.5; 25 UG/1; UG/1; UG/1
1 POWDER RESPIRATORY (INHALATION) DAILY
Qty: 28 EACH | Refills: 3 | Status: SHIPPED | OUTPATIENT
Start: 2024-06-24

## 2024-06-27 DIAGNOSIS — Z79.4 TYPE 2 DIABETES MELLITUS WITH COMPLICATION, WITH LONG-TERM CURRENT USE OF INSULIN (HCC): ICD-10-CM

## 2024-06-27 DIAGNOSIS — I50.32 CHRONIC DIASTOLIC (CONGESTIVE) HEART FAILURE (HCC): ICD-10-CM

## 2024-06-27 DIAGNOSIS — E11.8 TYPE 2 DIABETES MELLITUS WITH COMPLICATION, WITH LONG-TERM CURRENT USE OF INSULIN (HCC): ICD-10-CM

## 2024-06-27 RX ORDER — SPIRONOLACTONE 25 MG/1
25 TABLET ORAL DAILY
Qty: 90 TABLET | Refills: 1 | Status: SHIPPED | OUTPATIENT
Start: 2024-06-27

## 2024-06-28 RX ORDER — INSULIN LISPRO 100 [IU]/ML
INJECTION, SOLUTION INTRAVENOUS; SUBCUTANEOUS
Qty: 15 ML | Refills: 3 | Status: SHIPPED | OUTPATIENT
Start: 2024-06-28

## 2024-07-04 ENCOUNTER — HOSPITAL ENCOUNTER (EMERGENCY)
Facility: HOSPITAL | Age: 64
Discharge: HOME | End: 2024-07-04
Attending: EMERGENCY MEDICINE
Payer: COMMERCIAL

## 2024-07-04 ENCOUNTER — APPOINTMENT (EMERGENCY)
Dept: RADIOLOGY | Facility: HOSPITAL | Age: 64
End: 2024-07-04
Payer: COMMERCIAL

## 2024-07-04 VITALS
SYSTOLIC BLOOD PRESSURE: 171 MMHG | TEMPERATURE: 99.2 F | DIASTOLIC BLOOD PRESSURE: 95 MMHG | OXYGEN SATURATION: 99 % | HEART RATE: 71 BPM | RESPIRATION RATE: 20 BRPM

## 2024-07-04 DIAGNOSIS — M54.9 CHRONIC BACK PAIN: ICD-10-CM

## 2024-07-04 DIAGNOSIS — G89.29 CHRONIC BACK PAIN: ICD-10-CM

## 2024-07-04 DIAGNOSIS — M62.838 MUSCLE SPASM: Primary | ICD-10-CM

## 2024-07-04 LAB
BILIRUB UR QL STRIP: NEGATIVE
CLARITY UR: CLEAR
COLOR UR: COLORLESS
GLUCOSE UR STRIP-MCNC: ABNORMAL MG/DL
HGB UR QL STRIP.AUTO: NEGATIVE
KETONES UR STRIP-MCNC: NEGATIVE MG/DL
LEUKOCYTE ESTERASE UR QL STRIP: NEGATIVE
NITRITE UR QL STRIP: NEGATIVE
PH UR STRIP.AUTO: 6 [PH]
PROT UR STRIP-MCNC: NEGATIVE MG/DL
SP GR UR STRIP.AUTO: 1.01 (ref 1–1.03)
UROBILINOGEN UR STRIP-ACNC: <2 MG/DL

## 2024-07-04 PROCEDURE — 99285 EMERGENCY DEPT VISIT HI MDM: CPT

## 2024-07-04 PROCEDURE — 99284 EMERGENCY DEPT VISIT MOD MDM: CPT | Performed by: PHYSICIAN ASSISTANT

## 2024-07-04 PROCEDURE — 72080 X-RAY EXAM THORACOLMB 2/> VW: CPT

## 2024-07-04 RX ORDER — TRAMADOL HYDROCHLORIDE 50 MG/1
100 TABLET ORAL ONCE
Status: COMPLETED | OUTPATIENT
Start: 2024-07-04 | End: 2024-07-04

## 2024-07-04 RX ORDER — BACLOFEN 10 MG/1
10 TABLET ORAL 3 TIMES DAILY
Qty: 12 TABLET | Refills: 0 | Status: SHIPPED | OUTPATIENT
Start: 2024-07-04

## 2024-07-04 RX ORDER — PREDNISONE 20 MG/1
60 TABLET ORAL ONCE
Status: COMPLETED | OUTPATIENT
Start: 2024-07-04 | End: 2024-07-04

## 2024-07-04 RX ORDER — PREDNISONE 20 MG/1
40 TABLET ORAL DAILY
Qty: 8 TABLET | Refills: 0 | Status: SHIPPED | OUTPATIENT
Start: 2024-07-04 | End: 2024-07-08

## 2024-07-04 RX ORDER — ACETAMINOPHEN 325 MG/1
975 TABLET ORAL ONCE
Status: COMPLETED | OUTPATIENT
Start: 2024-07-04 | End: 2024-07-04

## 2024-07-04 RX ADMIN — PREDNISONE 60 MG: 20 TABLET ORAL at 09:51

## 2024-07-04 RX ADMIN — ACETAMINOPHEN 975 MG: 325 TABLET ORAL at 09:51

## 2024-07-04 RX ADMIN — TRAMADOL HYDROCHLORIDE 100 MG: 50 TABLET, COATED ORAL at 11:57
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
## 2024-07-05 ENCOUNTER — TELEPHONE (OUTPATIENT)
Age: 64
End: 2024-07-05

## 2024-07-05 NOTE — TELEPHONE ENCOUNTER
Caller: austin Rosado    Doctor: Scott    Reason for call: pt called to notate that he was in the ED on 7/4.  He has an OV on 7/11 but wanted us to note the ED visit    Call back#: [phone]

## 2024-07-09 ENCOUNTER — TELEPHONE (OUTPATIENT)
Age: 64
End: 2024-07-09

## 2024-07-09 ENCOUNTER — TELEPHONE (OUTPATIENT)
Dept: PULMONOLOGY | Facility: MEDICAL CENTER | Age: 64
End: 2024-07-09

## 2024-07-09 NOTE — TELEPHONE ENCOUNTER
LM for patient to call office back to schedule appointment for oxygen testing. Schedule appointment with any provider

## 2024-07-09 NOTE — TELEPHONE ENCOUNTER
[facility] calling for O2 testing to be faxed to [phone] again.    They would also like the pt to be scheduled for a f/u so they may have updated notes.

## 2024-07-11 ENCOUNTER — TELEPHONE (OUTPATIENT)
Dept: PAIN MEDICINE | Facility: CLINIC | Age: 64
End: 2024-07-11

## 2024-07-11 ENCOUNTER — OFFICE VISIT (OUTPATIENT)
Dept: PAIN MEDICINE | Facility: CLINIC | Age: 64
End: 2024-07-11
Payer: COMMERCIAL

## 2024-07-11 VITALS — SYSTOLIC BLOOD PRESSURE: 137 MMHG | DIASTOLIC BLOOD PRESSURE: 89 MMHG | HEART RATE: 79 BPM

## 2024-07-11 DIAGNOSIS — M48.062 SPINAL STENOSIS OF LUMBAR REGION WITH NEUROGENIC CLAUDICATION: Primary | ICD-10-CM

## 2024-07-11 DIAGNOSIS — E66.01 MORBID OBESITY (HCC): ICD-10-CM

## 2024-07-11 DIAGNOSIS — I48.20 CHRONIC A-FIB (HCC): ICD-10-CM

## 2024-07-11 DIAGNOSIS — J44.9 OSA AND COPD OVERLAP SYNDROME (HCC): Chronic | ICD-10-CM

## 2024-07-11 DIAGNOSIS — M54.16 LUMBAR RADICULOPATHY: ICD-10-CM

## 2024-07-11 DIAGNOSIS — I25.10 CORONARY ARTERY DISEASE INVOLVING NATIVE CORONARY ARTERY OF NATIVE HEART WITHOUT ANGINA PECTORIS: ICD-10-CM

## 2024-07-11 DIAGNOSIS — D69.6 PLATELETS DECREASED (HCC): ICD-10-CM

## 2024-07-11 DIAGNOSIS — G47.33 OSA AND COPD OVERLAP SYNDROME (HCC): Chronic | ICD-10-CM

## 2024-07-11 PROCEDURE — 99214 OFFICE O/P EST MOD 30 MIN: CPT | Performed by: STUDENT IN AN ORGANIZED HEALTH CARE EDUCATION/TRAINING PROGRAM

## 2024-07-11 NOTE — TELEPHONE ENCOUNTER
S/w pt and advised LD of eliquis to be 7/29 prior to procedure on 8/2.  Pt advised to NOT take eliquis from 7/30-until advised to resume after procedure on 8/2. Pt verbalized understanding and appreciative of call.
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
## 2024-07-11 NOTE — TELEPHONE ENCOUNTER
[facility] AMBULATORY ENCOUNTER  ORTHOPEDIC POSTOPERATIVE VISIT    DATE OF INJURY:  Not available  DATE OF SURGERY:  04/05/2021; 05/28/2021  SURGERY PERFORMED:  Anterior C4-5, C5-6, and C6-7 diskectomies, decompression, interbody fusion with plate/screw fixation; C4-7 posterolateral fusion in situ  PREOPERATIVE SYMPTOMS:  Severe upper extremity pain; evidence of fixation failure    SUBJECTIVE:    Santo presents for his 1st postoperative follow-up regarding C4-7 posterolateral fusion in situ performed on 05/28/2021 secondary to C4-7 ACDF fixation failure performed on 04/05/2021.  His surgery was performed without complication, and he was postoperatively admitted to the hospital for recovery and observation.  During his hospital course, he developed a low-grade fever, with workup demonstrating mild atelectasis.  Prophylactic antibiotics were started, as he noted erythema around his incision without drainage or swelling.  His vital signs were stable at the time of discharge on 6/03/2021.    Today, he reports he is doing relatively well in regards to his postoperative recovery period he reports posterior neck pain, tenderness, incision site numbness, occasional pain radiation into his thoracic spine.  Postoperative pain is being managed with gabapentin, oxycodone.  He denies any fevers, chills, incision site drainage.  He has been compliant with his cervical collar.  He has no other major concerns today.    Treatment  Current medications:  Gabapentin, oxycodone, tizanidine  Past medications:  Cymbalta, gabapentin, oxycodone, Lyrica, oral steroids  Physical therapy:  Recent prior to surgery  Chiropractic care:  None  Acupuncture:  None  Spinal Injections:  In the past  Other:  None      No problems updated.     PROBLEM LIST:    Patient Active Problem List   Diagnosis   • C6 radiculopathy   • Cervical radiculopathy   • Cervical spondylosis        PAST HISTORIES:    I have reviewed the past medical history, family history,  Scheduled patient for LESI 08/02/2024  Patient is taking Eliquis  Nothing to eat or drink 1 hour prior to procedure  Needs to arrange transportation  Proper clothing for procedure  No vaccines 2 weeks prior or after procedure  If ill or place on antibiotics, please call to reschedule        Dear Dr. Centeno ,    Patient has been scheduled for an epidural steroid injection with Dr. Chavez/Alyssa on 08/02/2024. The patient is currently taking Eliquis which needs to be held 3 days prior to procedure. Is the patient okay to hold medication for required number of days prior to procedure? Patient is also requesting from you a refill of Tramadol and Zanaflex. Please advise.    social history, medications and allergies listed in the medical record as obtained by my nursing staff and support staff and agree with their documentation.      OBJECTIVE:      PHYSICAL EXAMINATION:    Vitals: There were no vitals taken for this visit.     Physical Exam   Constitutional: He is oriented to person, place, and time and well-developed, well-nourished, and in no distress.   HENT:   Head: Normocephalic and atraumatic.   Neck: No tracheal tenderness present. No tracheal deviation present.   Cardiovascular:   Pulses:       Dorsalis pedis pulses are 2+ on the right side and 2+ on the left side.        Posterior tibial pulses are 2+ on the right side and 2+ on the left side.   Pulmonary/Chest: Effort normal. No respiratory distress.   Abdominal: Soft. Normal appearance.   Musculoskeletal:      Cervical back: Full passive range of motion without pain, normal range of motion and neck supple. Decreased range of motion.      Comments: Strength (bilateral):  Deltoid (C5): 5/5  Biceps (C6): 5/5  Triceps (C7): 5/5  Finger Flexion (C8): 5/5  Finger Abduction (T1): 5/5   Neurological: He is alert and oriented to person, place, and time. He has normal motor skills.   Reflex Scores:       Tricep reflexes are 2+ on the right side and 2+ on the left side.       Bicep reflexes are 2+ on the right side and 2+ on the left side.       Brachioradialis reflexes are 2+ on the right side and 2+ on the left side.  Neurovascular Exam (bilateral)  Lateral arm (C5): Intact  Lateral forearm (C6): Intact  Middle finger (C7): Intact  Medial forearm (C8): Intact  Medial arm (T1) Intact   Skin: Skin is warm, dry and intact. Nails show no clubbing.   Incision site: well-healing without evidence of drainage, erythema, wound dehiscence.   Psychiatric: Mood, affect and judgment normal.         IMAGING STUDIES:    Postsurgical cervical radiographs were obtained demonstrating separation of the anterior vertebral margin at C5, C6 by  approximately 6 mm.  This is unchanged compared to previous imaging.  There is redemonstrated posterior C4-7 fusion with appropriate hardware placement.    ASSESSMENT & PLAN               Assessment:   C4-7 disc herniations with severe stenosis   Cervical radiculopathy   Cervical spondylosis with myelopathy   Dysphagia   Status post C4-7 ACDF from 04/05/2021   C6 fixation failure status post C4-7 posterolateral fusion on 05/28/2020   Postoperative cervical radiculitis           Discussion:  I discussed with the patient his clinical and radiographic findings as well as the expected postoperative course.  I provided a number for orthotics so that he may be able to obtain additional padding for his cervical collar.  He will continue to wear his collar at all times except for hygiene purposes.  I recommend that he continue using the bone stimulator.  He will follow up with our office in 3 weeks for a repeat clinical and radiographic evaluation.    Plan:  Activity modification  Avoid excessive or repetitive bending, twisting, lifting greater than 10 lb  Hard cervical collar at all times except for hygiene purposes, bone growth stimulator use  Oxycodone, gabapentin, tizanidine for postoperative pain management  Return to clinic in 3 weeks for repeat clinical and radiographic evaluation  X-rays to obtain before next clinic visit:  Cervical spine AP and lateral views              Greater than 50% of this time was spent counseling the patient and coordinating care.      Instructions provided as documented in the after visit summary.               The patient indicated understanding of the diagnosis and agreed with the plan of care.     Supervising Physician: Dr. Clem Zamorano MD

## 2024-07-11 NOTE — TELEPHONE ENCOUNTER
Hi.  I'm an NP but thank you.  He can hold the eliquis for 3 days prior and I will refill his medication.  Thank you.

## 2024-07-12 ENCOUNTER — TELEPHONE (OUTPATIENT)
Age: 64
End: 2024-07-12

## 2024-07-12 NOTE — TELEPHONE ENCOUNTER
Pt requesting lyft for upcoming apt 7/15 as he is unable to get it through medicare. He would like a call once completed as well

## 2024-07-16 ENCOUNTER — IN HOME VISIT (OUTPATIENT)
Age: 64
End: 2024-07-16

## 2024-07-16 VITALS
DIASTOLIC BLOOD PRESSURE: 80 MMHG | RESPIRATION RATE: 18 BRPM | HEART RATE: 94 BPM | OXYGEN SATURATION: 97 % | SYSTOLIC BLOOD PRESSURE: 140 MMHG

## 2024-07-16 DIAGNOSIS — Z79.4 TYPE 2 DIABETES MELLITUS WITH COMPLICATION, WITH LONG-TERM CURRENT USE OF INSULIN (HCC): Primary | ICD-10-CM

## 2024-07-16 DIAGNOSIS — M51.36 OTHER INTERVERTEBRAL DISC DEGENERATION, LUMBAR REGION: ICD-10-CM

## 2024-07-16 DIAGNOSIS — F31.9 BIPOLAR AFFECTIVE DISORDER, REMISSION STATUS UNSPECIFIED (HCC): ICD-10-CM

## 2024-07-16 DIAGNOSIS — R26.2 AMBULATORY DYSFUNCTION: ICD-10-CM

## 2024-07-16 DIAGNOSIS — E11.8 TYPE 2 DIABETES MELLITUS WITH COMPLICATION, WITH LONG-TERM CURRENT USE OF INSULIN (HCC): Primary | ICD-10-CM

## 2024-07-16 DIAGNOSIS — M48.061 FORAMINAL STENOSIS OF LUMBAR REGION: ICD-10-CM

## 2024-07-16 DIAGNOSIS — G89.29 CHRONIC INTRACTABLE PAIN: ICD-10-CM

## 2024-07-16 DIAGNOSIS — Z59.41 FOOD INSECURITY: ICD-10-CM

## 2024-07-16 DIAGNOSIS — C91.10 CLL (CHRONIC LYMPHOCYTIC LEUKEMIA) (HCC): ICD-10-CM

## 2024-07-16 PROBLEM — M25.512 SEVERE PAIN OF LEFT SHOULDER: Status: RESOLVED | Noted: 2023-08-24 | Resolved: 2024-07-16

## 2024-07-16 PROBLEM — Z74.1 NEED FOR ASSISTANCE WITH PERSONAL CARE: Status: RESOLVED | Noted: 2023-10-25 | Resolved: 2024-07-16

## 2024-07-16 PROBLEM — R06.02 SHORTNESS OF BREATH: Status: RESOLVED | Noted: 2022-02-07 | Resolved: 2024-07-16
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
## 2024-07-16 PROBLEM — R73.9 HYPERGLYCEMIA: Status: RESOLVED | Noted: 2020-05-01 | Resolved: 2024-07-16

## 2024-07-16 PROBLEM — J41.0 SIMPLE CHRONIC BRONCHITIS (HCC): Chronic | Status: RESOLVED | Noted: 2020-10-11 | Resolved: 2024-07-16

## 2024-07-16 PROBLEM — E78.5 HYPERLIPIDEMIA: Status: RESOLVED | Noted: 2020-10-29 | Resolved: 2024-07-16

## 2024-07-16 PROBLEM — Z86.39 H/O VITAMIN D DEFICIENCY: Status: RESOLVED | Noted: 2019-10-28 | Resolved: 2024-07-16

## 2024-07-16 PROBLEM — R94.2 RESTRICTIVE VENTILATORY DEFECT: Status: RESOLVED | Noted: 2019-03-26 | Resolved: 2024-07-16

## 2024-07-16 PROBLEM — M54.50 LOW BACK PAIN, UNSPECIFIED: Status: RESOLVED | Noted: 2022-04-28 | Resolved: 2024-07-16

## 2024-07-16 PROCEDURE — 99349 HOME/RES VST EST MOD MDM 40: CPT | Performed by: NURSE PRACTITIONER

## 2024-07-16 RX ORDER — INSULIN LISPRO 200 [IU]/ML
20 INJECTION, SOLUTION SUBCUTANEOUS
Qty: 6 ML | Refills: 3 | Status: SHIPPED | OUTPATIENT
Start: 2024-07-16

## 2024-07-16 RX ORDER — OXYCODONE HYDROCHLORIDE AND ACETAMINOPHEN 5; 325 MG/1; MG/1
2 TABLET ORAL EVERY 8 HOURS PRN
Qty: 180 TABLET | Refills: 0 | Status: SHIPPED | OUTPATIENT
Start: 2024-07-16

## 2024-07-16 SDOH — ECONOMIC STABILITY - FOOD INSECURITY: FOOD INSECURITY: Z59.41

## 2024-07-17 LAB

## 2024-07-17 NOTE — PROGRESS NOTES
"Ambulatory Visit  Name: Alonzo Watson      : 1960      MRN: [id number]  Encounter Provider: MANAS Payne  Encounter Date: 2024   Encounter department: [facility]    Assessment & Plan   1. Type 2 diabetes mellitus with complication, with long-term current use of insulin (HCC)  -     insuln lispro (HumaLOG KwikPen) 200 units/mL CONCENTRATED U-200 injection pen; Inject 20 Units under the skin 3 (three) times a day with meals  Hemoglobin A1C  Normal 4.0-5.6%; PreDiabetic 5.7-6.4%; Diabetic >=6.5%; Glycemic control for adults with diabetes <7.0% % 9.7 High  9.7 High  9.8 High    -patient HbA1C remains elevated and admits to \"cheating' on diet  -unable to see endocrinology due to home bound and no virtual visits    2. Chronic intractable pain  -     naloxone (NARCAN) 4 mg/0.1 mL nasal spray; Administer 1 spray into a nostril. If no response after 2-3 minutes, give another dose in the other nostril using a new spray.  -     oxyCODONE-acetaminophen (Percocet) 5-325 mg per tablet; Take 2 tablets by mouth every 8 (eight) hours as needed for moderate pain Max Daily Amount: 6 tablets  -     stopped tramadol due to ineffective  -     insurance does not cover oxycontin  -     fentanyl patch not appropriate due to patient sweats a lot and cannot keep patches on  -     scheduled for spine injections early August-patient feels unable to get to appointment-advised to notify orthopedic office  -     stated denied by palliative care  3. Foraminal stenosis of lumbar region  -     oxyCODONE-acetaminophen (Percocet) 5-325 mg per tablet; Take 2 tablets by mouth every 8 (eight) hours as needed for moderate pain Max Daily Amount: 6 tablets  4. Other intervertebral disc degeneration, lumbar region  -     oxyCODONE-acetaminophen (Percocet) 5-325 mg per tablet; Take 2 tablets by mouth every 8 (eight) hours as needed for moderate pain Max Daily Amount: 6 tablets  5. Bipolar affective " disorder, remission status unspecified (HCC)       -multiple psych medication via psychiatrist  6. Ambulatory dysfunction       -transport w/c for movement in apartment building  7. CLL (chronic lymphocytic leukemia) (HCC)       -chronic       - had Igg infusion and unable to follow up with future infusions  8. Food insecurity  -     Ambulatory Referral to Social Work Care Management Program; Future  -     lost food stamps due to lack of ability to complete form       History of Present Illness     64 yo patient seen for home visit due to home bound, 2 recent ER visits and intractable back pain of spine.  Alonzo remains home bound due to weakness, pain, inability to get to certain outside appointments due to difficulty ambulating and assist with appointments as well difficulty with endurance.  >40 minutes spent with patient doing comprehensive history and physical assessment, planning and diagnosing and education of diabetes and opioid use.          Review of Systems   Constitutional:  Positive for fatigue.   HENT: Negative.     Eyes: Negative.    Respiratory: Negative.     Cardiovascular: Negative.    Gastrointestinal: Negative.    Endocrine: Negative.    Genitourinary: Negative.    Musculoskeletal:  Positive for arthralgias, back pain and myalgias.   Skin: Negative.    Allergic/Immunologic: Negative.    Neurological: Negative.    Hematological: Negative.    Psychiatric/Behavioral: Negative.         Objective     /80   Pulse 94   Resp 18   SpO2 97%     Physical Exam  Constitutional:       General: He is not in acute distress.     Appearance: Normal appearance. He is not ill-appearing, toxic-appearing or diaphoretic.   HENT:      Head: Normocephalic and atraumatic.      Right Ear: External ear normal.      Left Ear: External ear normal.      Nose: Nose normal. No congestion.      Mouth/Throat:      Mouth: Mucous membranes are moist.   Eyes:      General:         Right eye: No discharge.         Left eye: No  discharge.      Conjunctiva/sclera: Conjunctivae normal.   Cardiovascular:      Rate and Rhythm: Normal rate and regular rhythm.      Heart sounds: Normal heart sounds.   Pulmonary:      Effort: Pulmonary effort is normal. No respiratory distress.      Breath sounds: Normal breath sounds. No wheezing, rhonchi or rales.   Abdominal:      General: Bowel sounds are normal.      Palpations: Abdomen is soft.      Tenderness: There is no abdominal tenderness. There is no guarding.   Musculoskeletal:      Cervical back: Normal range of motion. No rigidity.      Right lower leg: No edema.      Left lower leg: No edema.   Skin:     General: Skin is warm and dry.      Findings: No bruising.   Neurological:      General: No focal deficit present.      Mental Status: He is alert and oriented to person, place, and time.      Gait: Gait abnormal.   Psychiatric:         Mood and Affect: Mood normal.         Behavior: Behavior normal.         Thought Content: Thought content normal.         Judgment: Judgment normal.       Administrative Statements

## 2024-07-17 NOTE — PROGRESS NOTES
Diabetic Foot Exam    Patient's shoes and socks removed.    Right Foot/Ankle   Right Foot Inspection  Skin Exam: skin normal and skin intact. No dry skin, no warmth, no callus, no erythema, no maceration, no abnormal color, no pre-ulcer, no ulcer and no callus.     Toe Exam: right toe deformity.     Sensory   Monofilament testing: diminished    Left Foot/Ankle  Left Foot Inspection  Skin Exam: skin normal and skin intact. No dry skin, no warmth, no erythema, no maceration, normal color, no pre-ulcer, no ulcer and no callus.     Toe Exam: ROM and strength within normal limits.     Sensory   Monofilament testing: diminished    Assign Risk Category  Deformity present  Loss of protective sensation  No weak pulses  Risk: 2

## 2024-07-24 DIAGNOSIS — M62.838 MUSCLE SPASM: ICD-10-CM

## 2024-07-24 DIAGNOSIS — E11.8 TYPE 2 DIABETES MELLITUS WITH COMPLICATION, WITH LONG-TERM CURRENT USE OF INSULIN (HCC): ICD-10-CM

## 2024-07-24 DIAGNOSIS — Z79.4 TYPE 2 DIABETES MELLITUS WITH COMPLICATION, WITH LONG-TERM CURRENT USE OF INSULIN (HCC): ICD-10-CM

## 2024-07-24 RX ORDER — BACLOFEN 10 MG/1
10 TABLET ORAL 3 TIMES DAILY
Qty: 12 TABLET | Refills: 0 | Status: SHIPPED | OUTPATIENT
Start: 2024-07-24

## 2024-07-25 NOTE — TELEPHONE ENCOUNTER
Patient called in again w/ same request - I need a prescription for the [id number] 2 READER. I lost the one that I had. My pharmacy left me a message that they only received a prescription for the Sensors.     He would like the [place] rx sent-pharmacy sated they didn't get it, Please Review. Thank You

## 2024-07-26 ENCOUNTER — TELEPHONE (OUTPATIENT)
Age: 64
End: 2024-07-26

## 2024-07-26 DIAGNOSIS — E11.8 TYPE 2 DIABETES MELLITUS WITH COMPLICATION, WITH LONG-TERM CURRENT USE OF INSULIN (HCC): Primary | ICD-10-CM

## 2024-07-26 DIAGNOSIS — Z79.4 TYPE 2 DIABETES MELLITUS WITH COMPLICATION, WITH LONG-TERM CURRENT USE OF INSULIN (HCC): Primary | ICD-10-CM

## 2024-07-26 NOTE — TELEPHONE ENCOUNTER
Patient is calling to check the status of Rx request as he would like the Rx sent to his pharmacy today, he is expresses his concern as his pharmacy by 4 pm. He is concerned he will have to go all weekend w/o. Please see previous message.

## 2024-07-26 NOTE — TELEPHONE ENCOUNTER
Patient is calling in to check the status of Rx for Free Style Sheba reader device.Patient states he did  the sensors yesterday but does need the reader. He is requesting this be sent in as soon as possible as he expresses his concern on how crucial this is for him to have right away.

## 2024-07-29 ENCOUNTER — TELEPHONE (OUTPATIENT)
Age: 64
End: 2024-07-29

## 2024-07-29 NOTE — TELEPHONE ENCOUNTER
Caller: austin Rosado    Doctor: Scott    Reason for call: please cx upcoming procedure.  Pt not happy with the provider.  Email has been sent to the practice admin    Call back#: [phone]

## 2024-07-31 ENCOUNTER — IN HOME VISIT (OUTPATIENT)
Age: 64
End: 2024-07-31

## 2024-07-31 VITALS
RESPIRATION RATE: 18 BRPM | DIASTOLIC BLOOD PRESSURE: 70 MMHG | TEMPERATURE: 98.3 F | SYSTOLIC BLOOD PRESSURE: 120 MMHG | OXYGEN SATURATION: 99 % | HEART RATE: 102 BPM

## 2024-07-31 DIAGNOSIS — E87.1 HYPONATREMIA: ICD-10-CM

## 2024-07-31 DIAGNOSIS — G62.9 PERIPHERAL POLYNEUROPATHY: ICD-10-CM

## 2024-07-31 DIAGNOSIS — M62.838 MUSCLE SPASMS OF BOTH LOWER EXTREMITIES: ICD-10-CM

## 2024-07-31 DIAGNOSIS — M54.32 SCIATICA OF LEFT SIDE: Primary | ICD-10-CM

## 2024-07-31 DIAGNOSIS — I48.91 ATRIAL FIBRILLATION WITH RVR (HCC): ICD-10-CM

## 2024-07-31 PROCEDURE — 99349 HOME/RES VST EST MOD MDM 40: CPT | Performed by: NURSE PRACTITIONER

## 2024-07-31 RX ORDER — TIZANIDINE 4 MG/1
4 TABLET ORAL EVERY 8 HOURS PRN
Qty: 120 TABLET | Refills: 0 | Status: SHIPPED | OUTPATIENT
Start: 2024-07-31

## 2024-07-31 RX ORDER — PREDNISONE 20 MG/1
40 TABLET ORAL DAILY
Qty: 10 TABLET | Refills: 0 | Status: SHIPPED | OUTPATIENT
Start: 2024-07-31 | End: 2024-08-05

## 2024-07-31 RX ORDER — GABAPENTIN 800 MG/1
800 TABLET ORAL 4 TIMES DAILY
Qty: 120 TABLET | Refills: 3 | Status: SHIPPED | OUTPATIENT
Start: 2024-07-31

## 2024-07-31 RX ORDER — SODIUM CHLORIDE 1 G/1
1 TABLET ORAL EVERY MORNING
Qty: 30 TABLET | Refills: 1 | Status: SHIPPED | OUTPATIENT
Start: 2024-07-31

## 2024-07-31 RX ORDER — SODIUM CHLORIDE 1 G/1
1 TABLET ORAL EVERY MORNING
Qty: 30 TABLET | Refills: 3 | Status: SHIPPED | OUTPATIENT
Start: 2024-07-31

## 2024-07-31 RX ORDER — DIGOXIN 250 MCG
250 TABLET ORAL DAILY
Qty: 90 TABLET | Refills: 1 | Status: SHIPPED | OUTPATIENT
Start: 2024-07-31

## 2024-07-31 NOTE — PROGRESS NOTES
Assessment/Plan:      Diagnoses and all orders for this visit:    Sciatica of left side  -     predniSONE 20 mg tablet; Take 2 tablets (40 mg total) by mouth daily for 5 days  -educated to rest and continue with BG monitoring at time of stress    Muscle spasms of both lower extremities  -     tiZANidine (ZANAFLEX) 4 mg tablet; Take 1 tablet (4 mg total) by mouth every 8 (eight) hours as needed for muscle spasms    Hyponatremia  -     sodium chloride 1 g tablet; Take 1 tablet (1 g total) by mouth every morning          Subjective:     Patient ID: Alonzo Watson is a 63 y.o. male.    Patient seen today at home due to being home bound, in pain, unable to leave independently and in urgent situation.  Alonzo states he has severe pain and is unable to stand well since office visit last week in which he states his knee was manipulated.  He was asking to be seen to avoid hospitalization.  >40 minutes was spent with patient on detailed history and physical assessment, planning and diagnosing and education.          Review of Systems   Constitutional:  Positive for fatigue.   HENT: Negative.     Eyes: Negative.    Respiratory: Negative.     Cardiovascular: Negative.    Gastrointestinal: Negative.    Endocrine: Negative.    Genitourinary: Negative.    Musculoskeletal:  Positive for back pain, gait problem and myalgias.   Skin: Negative.    Allergic/Immunologic: Negative.    Neurological:  Negative for headaches.   Hematological: Negative.    Psychiatric/Behavioral: Negative.           Objective:     Physical Exam  Constitutional:       General: He is not in acute distress.     Appearance: Normal appearance. He is diaphoretic. He is not ill-appearing or toxic-appearing.   HENT:      Head: Normocephalic and atraumatic.      Right Ear: External ear normal.      Left Ear: External ear normal.      Nose: Nose normal. No congestion.      Mouth/Throat:      Mouth: Mucous membranes are moist.      Pharynx: Oropharynx is clear.   Eyes:       General:         Right eye: No discharge.         Left eye: No discharge.      Conjunctiva/sclera: Conjunctivae normal.   Cardiovascular:      Rate and Rhythm: Normal rate and regular rhythm.      Pulses: Normal pulses.      Heart sounds: Normal heart sounds.   Pulmonary:      Effort: Pulmonary effort is normal. No respiratory distress.      Breath sounds: Normal breath sounds. No wheezing, rhonchi or rales.   Musculoskeletal:      Cervical back: Normal range of motion. No rigidity.      Right lower leg: No edema.      Left lower leg: No edema.      Comments: Enlarged muscle LL back.  Pain down left leg while standing.   Skin:     General: Skin is warm.   Neurological:      General: No focal deficit present.      Mental Status: He is alert and oriented to person, place, and time.      Motor: Weakness present.      Gait: Gait abnormal.   Psychiatric:         Mood and Affect: Mood normal.         Behavior: Behavior normal.         Thought Content: Thought content normal.         Judgment: Judgment normal.

## 2024-08-01 LAB
DME PARACHUTE DELIVERY DATE ACTUAL: NORMAL
DME PARACHUTE DELIVERY DATE EXPECTED: NORMAL
DME PARACHUTE DELIVERY DATE REQUESTED: NORMAL
DME PARACHUTE ITEM DESCRIPTION: NORMAL
DME PARACHUTE ORDER STATUS: NORMAL
DME PARACHUTE SUPPLIER NAME: NORMAL
DME PARACHUTE SUPPLIER PHONE: NORMAL

## 2024-08-05 ENCOUNTER — TELEPHONE (OUTPATIENT)
Dept: HEMATOLOGY ONCOLOGY | Facility: MEDICAL CENTER | Age: 64
End: 2024-08-05

## 2024-08-05 ENCOUNTER — TELEPHONE (OUTPATIENT)
Age: 64
End: 2024-08-05

## 2024-08-05 NOTE — TELEPHONE ENCOUNTER
Left voice message on patient's phone indicating that Dr. Watson had a medical emergency, and would be out of the office indefinitely.  Relayed fact that Dr. Alan Burns would be covering his patients at [facility] on both Tuesdays and Thursdays, and that patient would be moved to her schedule to accommodate them. Provided [facility] callback number [phone] for any questions/concerns, and that new appointment would be September 5th at 10:40am.

## 2024-08-05 NOTE — TELEPHONE ENCOUNTER
Here is the plan from today's visit    1. Decreased hearing of both ears  Referred you to audiology  [facility] [phone]     - AUDIOLOGY ADULT REFERRAL - INTERNAL    2. Benign essential hypertension  Looks better!  Plan is to cut your salt in half  It is possible that you might not need the new blood pressure medication if you cut your salt down.   Exercise EVERY day.... Even just a little, so that your brain does not have to think and say NO    3. Arthritis of knee  Keep moving!!      Please call or return to clinic if your symptoms don't go away.    Follow up plan  6 months    Thank you for coming to [facility] today.  Lab Testing:  **If you had lab testing today and your results are reassuring or normal they will be mailed to you or sent through [id number] within 7 days.   **If the lab tests need quick action we will call you with the results.  The phone number we will call with results is # [phone] (home) . If this is not the best number please call our clinic and change the number.  Medication Refills:  If you need any refills please call your pharmacy and they will contact us.   If you need to  your refill at a new pharmacy, please contact the new pharmacy directly. The new pharmacy will help you get your medications transferred faster.   Scheduling:  If you have any concerns about today's visit or wish to schedule another appointment please call our office during normal business hours [phone] (8-5:00 M-F)  If a referral was made to a [facility] and you don't get a call from central scheduling please call [phone].  If a Mammogram was ordered for you at [facility] call [phone] to schedule or change your appointment.  If you had an XRay/CT/Ultrasound/MRI ordered the number is [phone] to schedule or change your radiology appointment.   Medical Concerns:  If you have urgent medical concerns please call [phone] at any time of the  Per our convo. "day.    Ange Crespo MD    Audiology referral  [phone]  \"LVM, Letter Sent\"    "

## 2024-08-05 NOTE — TELEPHONE ENCOUNTER
Pt calls in originally asking to speak to Betina. After inform him she was not available pt states he needs to cancel his upcoming appt again due to having to stay off his feet. He is wondering if there is any way to complete test in his home for re certification for his oxygen. Please advise

## 2024-08-05 NOTE — TELEPHONE ENCOUNTER
I spoke with Tanvi in O2 dept at [facility] to inform her patient is currently homebound and cannot come into the office for   re certification. His last appointment  was a virtual in Dec. 2023. She forwarded my call to the prior auth dept, I LVM with same information

## 2024-08-16 ENCOUNTER — PATIENT OUTREACH (OUTPATIENT)
Age: 64
End: 2024-08-16

## 2024-08-16 DIAGNOSIS — M48.061 FORAMINAL STENOSIS OF LUMBAR REGION: ICD-10-CM

## 2024-08-16 DIAGNOSIS — Z79.4 TYPE 2 DIABETES MELLITUS WITH COMPLICATION, WITH LONG-TERM CURRENT USE OF INSULIN (HCC): ICD-10-CM

## 2024-08-16 DIAGNOSIS — M62.838 MUSCLE SPASM: ICD-10-CM

## 2024-08-16 DIAGNOSIS — M51.36 OTHER INTERVERTEBRAL DISC DEGENERATION, LUMBAR REGION: ICD-10-CM

## 2024-08-16 DIAGNOSIS — E11.8 TYPE 2 DIABETES MELLITUS WITH COMPLICATION, WITH LONG-TERM CURRENT USE OF INSULIN (HCC): ICD-10-CM

## 2024-08-16 DIAGNOSIS — E11.65 UNCONTROLLED TYPE 2 DIABETES MELLITUS WITH HYPERGLYCEMIA (HCC): ICD-10-CM

## 2024-08-16 DIAGNOSIS — G89.29 CHRONIC INTRACTABLE PAIN: ICD-10-CM

## 2024-08-16 RX ORDER — INSULIN GLARGINE 100 [IU]/ML
60 INJECTION, SOLUTION SUBCUTANEOUS 2 TIMES DAILY
Qty: 50 ML | Refills: 3 | Status: ON HOLD | OUTPATIENT
Start: 2024-08-16 | End: 2024-08-20

## 2024-08-16 RX ORDER — PEN NEEDLE, DIABETIC, SAFETY 30 GX3/16"
NEEDLE, DISPOSABLE MISCELLANEOUS
Qty: 150 EACH | Refills: 6 | Status: ON HOLD | OUTPATIENT
Start: 2024-08-16

## 2024-08-16 RX ORDER — BACLOFEN 10 MG/1
10 TABLET ORAL 3 TIMES DAILY
Qty: 90 TABLET | Refills: 1 | Status: SHIPPED | OUTPATIENT
Start: 2024-08-16 | End: 2024-08-20

## 2024-08-16 RX ORDER — OXYCODONE AND ACETAMINOPHEN 5; 325 MG/1; MG/1
2 TABLET ORAL EVERY 8 HOURS PRN
Qty: 180 TABLET | Refills: 0 | Status: ON HOLD | OUTPATIENT
Start: 2024-08-16

## 2024-08-16 RX ORDER — INSULIN LISPRO 200 [IU]/ML
20 INJECTION, SOLUTION SUBCUTANEOUS
Qty: 6 ML | Refills: 3 | Status: ON HOLD | OUTPATIENT
Start: 2024-08-16 | End: 2024-08-20

## 2024-08-16 NOTE — PROGRESS NOTES
"OP MANSI had received a referral from MANAS Payne r/t food insecurity. PREMA NAZARIO had completed a chart review. Per chart, patient lost his SNAP benefits due to lack of ability to complete form.    PREMA NAZARIO had called the patient via phone. OP SW left a voicemail. OP MANSI received a call back from the patient. OP SW introduced herself and reason for consult. Patient reported he was able to reinstate his SNAP benefits.    Patient stated he was looking for assistance with preparing a living will. OP SW advised patient that he will need to speak with legal services. Patient became upset and said \"I don't have money to do that.\" OP SW informed the patient that she can reach out to [facility] to find out what they can offer. Patient agreed.    Patient stated he lives alone. Patient stated he has his sister for support. Patient stated he has SSI for income. Patient did not report any housing or utilities concerns. Patient did not report any transportation issues. Patient did not report any other needs at this time.    PREMA NAZARIO had provided her contact information. OP SW advised patient reach out as needed. Patient agreed. Patient denied any continued SW outreach at this time.     PREMA NAZARIO had called [facility]. OP MANSI spoke with Analisa. Analisa reported patient has [employer]. Analisa reported patient needs to call his insurance to be assessed for MLTSS. Analisa stated patient can call [facility] [phone] for assistance with living will.    Patient has [employer] Nyasia EDMONDS. PREMA NAZARIO had called Nyasia via phone. Nyasia reported that she did a home visit yesterday for this patient. Nyasia reported patient has a home health aide through [facility]. Nyasia reported patient is approved 20 hours a week. Nyasia reported patient was getting home delivered meals, [employer] but placed on hold since aide cook for him.     Nyasia reported they do not do paperwork for " living will. PREMA NAZARIO informed Nyasia about information from office on aging. PREMA NAZARIO informed Nyasia that she will send patient information via [employer] with legal services information. Nyasia understood. PREMA NAZARIO thanked Nyasia for update.      JERMAINE sent a message to the patient via [employer]. PREMA NAZARIO closed referral. Please reconsult as needed.

## 2024-08-18 ENCOUNTER — APPOINTMENT (INPATIENT)
Dept: RADIOLOGY | Facility: HOSPITAL | Age: 64
DRG: 551 | End: 2024-08-18
Payer: COMMERCIAL

## 2024-08-18 ENCOUNTER — HOSPITAL ENCOUNTER (INPATIENT)
Facility: HOSPITAL | Age: 64
LOS: 2 days | Discharge: HOME HEALTH | DRG: 551 | End: 2024-08-20
Attending: EMERGENCY MEDICINE | Admitting: STUDENT IN AN ORGANIZED HEALTH CARE EDUCATION/TRAINING PROGRAM
Payer: COMMERCIAL

## 2024-08-18 ENCOUNTER — APPOINTMENT (EMERGENCY)
Dept: RADIOLOGY | Facility: HOSPITAL | Age: 64
DRG: 551 | End: 2024-08-18
Payer: COMMERCIAL

## 2024-08-18 DIAGNOSIS — C91.10 CLL (CHRONIC LYMPHOCYTIC LEUKEMIA) (HCC): ICD-10-CM

## 2024-08-18 DIAGNOSIS — Z79.4 TYPE 2 DIABETES MELLITUS WITH COMPLICATION, WITH LONG-TERM CURRENT USE OF INSULIN (HCC): ICD-10-CM

## 2024-08-18 DIAGNOSIS — R06.02 SHORTNESS OF BREATH: ICD-10-CM

## 2024-08-18 DIAGNOSIS — R44.1 VISUAL HALLUCINATION: Primary | ICD-10-CM

## 2024-08-18 DIAGNOSIS — J20.9 COPD (CHRONIC OBSTRUCTIVE PULMONARY DISEASE) WITH ACUTE BRONCHITIS  (HCC): ICD-10-CM

## 2024-08-18 DIAGNOSIS — E11.65 UNCONTROLLED TYPE 2 DIABETES MELLITUS WITH HYPERGLYCEMIA (HCC): ICD-10-CM

## 2024-08-18 DIAGNOSIS — R33.9 URINARY RETENTION: ICD-10-CM

## 2024-08-18 DIAGNOSIS — M62.81 MUSCLE WEAKNESS (GENERALIZED): ICD-10-CM

## 2024-08-18 DIAGNOSIS — E87.20 LACTIC ACID ACIDOSIS: ICD-10-CM

## 2024-08-18 DIAGNOSIS — J44.0 COPD (CHRONIC OBSTRUCTIVE PULMONARY DISEASE) WITH ACUTE BRONCHITIS  (HCC): ICD-10-CM

## 2024-08-18 DIAGNOSIS — R26.89 OTHER ABNORMALITIES OF GAIT AND MOBILITY: ICD-10-CM

## 2024-08-18 DIAGNOSIS — R05.9 COUGH: ICD-10-CM

## 2024-08-18 DIAGNOSIS — E11.00 DM HYPEROSMOLARITY TYPE II, UNCONTROLLED (HCC): ICD-10-CM

## 2024-08-18 DIAGNOSIS — R26.2 AMBULATORY DYSFUNCTION: ICD-10-CM

## 2024-08-18 DIAGNOSIS — J44.9 CHRONIC OBSTRUCTIVE PULMONARY DISEASE, UNSPECIFIED COPD TYPE (HCC): ICD-10-CM

## 2024-08-18 DIAGNOSIS — E11.8 TYPE 2 DIABETES MELLITUS WITH COMPLICATION, WITH LONG-TERM CURRENT USE OF INSULIN (HCC): ICD-10-CM

## 2024-08-18 PROBLEM — D72.829 LEUKOCYTOSIS: Status: ACTIVE | Noted: 2024-08-18

## 2024-08-18 LAB
2HR DELTA HS TROPONIN: 0 NG/L
4HR DELTA HS TROPONIN: 0 NG/L
ALBUMIN SERPL BCG-MCNC: 4.4 G/DL (ref 3.5–5)
ALP SERPL-CCNC: 69 U/L (ref 34–104)
ALT SERPL W P-5'-P-CCNC: 20 U/L (ref 7–52)
ANION GAP SERPL CALCULATED.3IONS-SCNC: 13 MMOL/L (ref 4–13)
AST SERPL W P-5'-P-CCNC: 18 U/L (ref 13–39)
ATRIAL RATE: 91 BPM
BASE EX.OXY STD BLDV CALC-SCNC: 76.7 % (ref 60–80)
BASE EXCESS BLDV CALC-SCNC: -1.5 MMOL/L
BASOPHILS # BLD MANUAL: 0 THOUSAND/UL (ref 0–0.1)
BASOPHILS NFR MAR MANUAL: 0 % (ref 0–1)
BILIRUB SERPL-MCNC: 0.58 MG/DL (ref 0.2–1)
BILIRUB UR QL STRIP: NEGATIVE
BNP SERPL-MCNC: 55 PG/ML (ref 0–100)
BUN SERPL-MCNC: 22 MG/DL (ref 5–25)
CALCIUM SERPL-MCNC: 9.8 MG/DL (ref 8.4–10.2)
CARDIAC TROPONIN I PNL SERPL HS: 9 NG/L
CHLORIDE SERPL-SCNC: 92 MMOL/L (ref 96–108)
CLARITY UR: CLEAR
CO2 SERPL-SCNC: 26 MMOL/L (ref 21–32)
COLOR UR: YELLOW
CREAT SERPL-MCNC: 1.22 MG/DL (ref 0.6–1.3)
EOSINOPHIL # BLD MANUAL: 0 THOUSAND/UL (ref 0–0.4)
EOSINOPHIL NFR BLD MANUAL: 0 % (ref 0–6)
ERYTHROCYTE [DISTWIDTH] IN BLOOD BY AUTOMATED COUNT: 13.6 % (ref 11.6–15.1)
FLUAV RNA RESP QL NAA+PROBE: NEGATIVE
FLUBV RNA RESP QL NAA+PROBE: NEGATIVE
GFR SERPL CREATININE-BSD FRML MDRD: 62 ML/MIN/1.73SQ M
GLUCOSE SERPL-MCNC: 159 MG/DL (ref 65–140)
GLUCOSE SERPL-MCNC: 188 MG/DL (ref 65–140)
GLUCOSE SERPL-MCNC: 304 MG/DL (ref 65–140)
GLUCOSE SERPL-MCNC: 309 MG/DL (ref 65–140)
GLUCOSE UR STRIP-MCNC: ABNORMAL MG/DL
HCO3 BLDV-SCNC: 24.4 MMOL/L (ref 24–30)
HCT VFR BLD AUTO: 45.3 % (ref 36.5–49.3)
HGB BLD-MCNC: 15.2 G/DL (ref 12–17)
HGB UR QL STRIP.AUTO: NEGATIVE
KETONES UR STRIP-MCNC: NEGATIVE MG/DL
LACTATE SERPL-SCNC: 2.8 MMOL/L (ref 0.5–2)
LACTATE SERPL-SCNC: 2.8 MMOL/L (ref 0.5–2)
LACTATE SERPL-SCNC: 3 MMOL/L (ref 0.5–2)
LACTATE SERPL-SCNC: 3 MMOL/L (ref 0.5–2)
LACTATE SERPL-SCNC: 3.4 MMOL/L (ref 0.5–2)
LACTATE SERPL-SCNC: 3.6 MMOL/L (ref 0.5–2)
LEUKOCYTE ESTERASE UR QL STRIP: NEGATIVE
LYMPHOCYTES # BLD AUTO: 16.1 THOUSAND/UL (ref 0.6–4.47)
LYMPHOCYTES # BLD AUTO: 42 % (ref 14–44)
MAGNESIUM SERPL-MCNC: 2.1 MG/DL (ref 1.9–2.7)
MCH RBC QN AUTO: 31.5 PG (ref 26.8–34.3)
MCHC RBC AUTO-ENTMCNC: 33.6 G/DL (ref 31.4–37.4)
MCV RBC AUTO: 94 FL (ref 82–98)
MONOCYTES # BLD AUTO: 0.34 THOUSAND/UL (ref 0–1.22)
MONOCYTES NFR BLD: 1 % (ref 4–12)
NEUTROPHILS # BLD MANUAL: 17.11 THOUSAND/UL (ref 1.85–7.62)
NEUTS SEG NFR BLD AUTO: 51 % (ref 43–75)
NITRITE UR QL STRIP: NEGATIVE
O2 CT BLDV-SCNC: 15.9 ML/DL
PCO2 BLDV: 45.5 MM HG (ref 42–50)
PH BLDV: 7.35 [PH] (ref 7.3–7.4)
PH UR STRIP.AUTO: 5.5 [PH]
PLATELET # BLD AUTO: 177 THOUSANDS/UL (ref 149–390)
PLATELET BLD QL SMEAR: ADEQUATE
PMV BLD AUTO: 9.2 FL (ref 8.9–12.7)
PO2 BLDV: 42.6 MM HG (ref 35–45)
POTASSIUM SERPL-SCNC: 5.1 MMOL/L (ref 3.5–5.3)
PROCALCITONIN SERPL-MCNC: 0.11 NG/ML
PROT SERPL-MCNC: 7.4 G/DL (ref 6.4–8.4)
PROT UR STRIP-MCNC: NEGATIVE MG/DL
QRS AXIS: 62 DEGREES
QRSD INTERVAL: 88 MS
QT INTERVAL: 358 MS
QTC INTERVAL: 447 MS
RBC # BLD AUTO: 4.82 MILLION/UL (ref 3.88–5.62)
RBC MORPH BLD: NORMAL
RSV RNA RESP QL NAA+PROBE: NEGATIVE
SARS-COV-2 RNA RESP QL NAA+PROBE: NEGATIVE
SMUDGE CELLS BLD QL SMEAR: PRESENT
SODIUM SERPL-SCNC: 131 MMOL/L (ref 135–147)
SP GR UR STRIP.AUTO: 1.02 (ref 1–1.03)
T WAVE AXIS: 33 DEGREES
UROBILINOGEN UR STRIP-ACNC: <2 MG/DL
VARIANT LYMPHS # BLD AUTO: 6 %
VENTRICULAR RATE: 94 BPM
WBC # BLD AUTO: 33.54 THOUSAND/UL (ref 4.31–10.16)

## 2024-08-18 PROCEDURE — 99285 EMERGENCY DEPT VISIT HI MDM: CPT | Performed by: EMERGENCY MEDICINE

## 2024-08-18 PROCEDURE — 72131 CT LUMBAR SPINE W/O DYE: CPT

## 2024-08-18 PROCEDURE — 94760 N-INVAS EAR/PLS OXIMETRY 1: CPT

## 2024-08-18 PROCEDURE — 83735 ASSAY OF MAGNESIUM: CPT

## 2024-08-18 PROCEDURE — 85027 COMPLETE CBC AUTOMATED: CPT | Performed by: EMERGENCY MEDICINE

## 2024-08-18 PROCEDURE — 82805 BLOOD GASES W/O2 SATURATION: CPT | Performed by: EMERGENCY MEDICINE

## 2024-08-18 PROCEDURE — 72128 CT CHEST SPINE W/O DYE: CPT

## 2024-08-18 PROCEDURE — 84484 ASSAY OF TROPONIN QUANT: CPT | Performed by: EMERGENCY MEDICINE

## 2024-08-18 PROCEDURE — 94660 CPAP INITIATION&MGMT: CPT

## 2024-08-18 PROCEDURE — 80053 COMPREHEN METABOLIC PANEL: CPT | Performed by: EMERGENCY MEDICINE

## 2024-08-18 PROCEDURE — 85007 BL SMEAR W/DIFF WBC COUNT: CPT | Performed by: EMERGENCY MEDICINE

## 2024-08-18 PROCEDURE — 93010 ELECTROCARDIOGRAM REPORT: CPT | Performed by: INTERNAL MEDICINE

## 2024-08-18 PROCEDURE — 72125 CT NECK SPINE W/O DYE: CPT

## 2024-08-18 PROCEDURE — 87040 BLOOD CULTURE FOR BACTERIA: CPT | Performed by: EMERGENCY MEDICINE

## 2024-08-18 PROCEDURE — 82948 REAGENT STRIP/BLOOD GLUCOSE: CPT

## 2024-08-18 PROCEDURE — 83605 ASSAY OF LACTIC ACID: CPT | Performed by: EMERGENCY MEDICINE

## 2024-08-18 PROCEDURE — 93005 ELECTROCARDIOGRAM TRACING: CPT

## 2024-08-18 PROCEDURE — 83880 ASSAY OF NATRIURETIC PEPTIDE: CPT | Performed by: EMERGENCY MEDICINE

## 2024-08-18 PROCEDURE — 84484 ASSAY OF TROPONIN QUANT: CPT

## 2024-08-18 PROCEDURE — 84145 PROCALCITONIN (PCT): CPT | Performed by: EMERGENCY MEDICINE

## 2024-08-18 PROCEDURE — 83605 ASSAY OF LACTIC ACID: CPT

## 2024-08-18 PROCEDURE — 36415 COLL VENOUS BLD VENIPUNCTURE: CPT | Performed by: EMERGENCY MEDICINE

## 2024-08-18 PROCEDURE — 83605 ASSAY OF LACTIC ACID: CPT | Performed by: STUDENT IN AN ORGANIZED HEALTH CARE EDUCATION/TRAINING PROGRAM

## 2024-08-18 PROCEDURE — 70450 CT HEAD/BRAIN W/O DYE: CPT

## 2024-08-18 PROCEDURE — 99285 EMERGENCY DEPT VISIT HI MDM: CPT

## 2024-08-18 PROCEDURE — 0241U HB NFCT DS VIR RESP RNA 4 TRGT: CPT | Performed by: EMERGENCY MEDICINE

## 2024-08-18 PROCEDURE — 87086 URINE CULTURE/COLONY COUNT: CPT

## 2024-08-18 PROCEDURE — 71045 X-RAY EXAM CHEST 1 VIEW: CPT

## 2024-08-18 PROCEDURE — 99222 1ST HOSP IP/OBS MODERATE 55: CPT | Performed by: STUDENT IN AN ORGANIZED HEALTH CARE EDUCATION/TRAINING PROGRAM

## 2024-08-18 RX ORDER — OXYCODONE AND ACETAMINOPHEN 5; 325 MG/1; MG/1
2 TABLET ORAL EVERY 8 HOURS PRN
Status: DISCONTINUED | OUTPATIENT
Start: 2024-08-18 | End: 2024-08-19

## 2024-08-18 RX ORDER — ACETAMINOPHEN 325 MG/1
975 TABLET ORAL ONCE
Status: COMPLETED | OUTPATIENT
Start: 2024-08-18 | End: 2024-08-18

## 2024-08-18 RX ORDER — SODIUM CHLORIDE 1 G/1
1 TABLET ORAL EVERY MORNING
Status: DISCONTINUED | OUTPATIENT
Start: 2024-08-18 | End: 2024-08-20 | Stop reason: HOSPADM

## 2024-08-18 RX ORDER — LAMOTRIGINE 25 MG/1
25 TABLET ORAL
Status: DISCONTINUED | OUTPATIENT
Start: 2024-08-18 | End: 2024-08-20 | Stop reason: HOSPADM

## 2024-08-18 RX ORDER — BUMETANIDE 0.25 MG/ML
1 INJECTION INTRAMUSCULAR; INTRAVENOUS ONCE
Status: COMPLETED | OUTPATIENT
Start: 2024-08-18 | End: 2024-08-18

## 2024-08-18 RX ORDER — QUETIAPINE FUMARATE 100 MG/1
200 TABLET, FILM COATED ORAL ONCE
Status: COMPLETED | OUTPATIENT
Start: 2024-08-18 | End: 2024-08-18

## 2024-08-18 RX ORDER — OXYCODONE AND ACETAMINOPHEN 5; 325 MG/1; MG/1
2 TABLET ORAL EVERY 8 HOURS PRN
Status: DISCONTINUED | OUTPATIENT
Start: 2024-08-18 | End: 2024-08-18

## 2024-08-18 RX ORDER — OXYCODONE HYDROCHLORIDE 5 MG/1
5 TABLET ORAL ONCE AS NEEDED
Status: COMPLETED | OUTPATIENT
Start: 2024-08-18 | End: 2024-08-18

## 2024-08-18 RX ORDER — METOPROLOL SUCCINATE 100 MG/1
200 TABLET, EXTENDED RELEASE ORAL DAILY
Status: DISCONTINUED | OUTPATIENT
Start: 2024-08-18 | End: 2024-08-20 | Stop reason: HOSPADM

## 2024-08-18 RX ORDER — HYDROMORPHONE HCL/PF 1 MG/ML
0.5 SYRINGE (ML) INJECTION EVERY 4 HOURS PRN
Status: DISCONTINUED | OUTPATIENT
Start: 2024-08-18 | End: 2024-08-20 | Stop reason: HOSPADM

## 2024-08-18 RX ORDER — DOCUSATE SODIUM 100 MG/1
100 CAPSULE, LIQUID FILLED ORAL 2 TIMES DAILY
Status: DISCONTINUED | OUTPATIENT
Start: 2024-08-18 | End: 2024-08-20 | Stop reason: HOSPADM

## 2024-08-18 RX ORDER — QUETIAPINE FUMARATE 300 MG/1
300 TABLET, FILM COATED ORAL
COMMUNITY

## 2024-08-18 RX ORDER — SPIRONOLACTONE 25 MG/1
25 TABLET ORAL DAILY
Status: DISCONTINUED | OUTPATIENT
Start: 2024-08-18 | End: 2024-08-20 | Stop reason: HOSPADM

## 2024-08-18 RX ORDER — HYDROMORPHONE HCL IN WATER/PF 6 MG/30 ML
0.2 PATIENT CONTROLLED ANALGESIA SYRINGE INTRAVENOUS ONCE
Status: COMPLETED | OUTPATIENT
Start: 2024-08-18 | End: 2024-08-18

## 2024-08-18 RX ORDER — TAMSULOSIN HYDROCHLORIDE 0.4 MG/1
0.4 CAPSULE ORAL
Status: DISCONTINUED | OUTPATIENT
Start: 2024-08-18 | End: 2024-08-18

## 2024-08-18 RX ORDER — LIDOCAINE 50 MG/G
2 PATCH TOPICAL DAILY
Status: DISCONTINUED | OUTPATIENT
Start: 2024-08-18 | End: 2024-08-20 | Stop reason: HOSPADM

## 2024-08-18 RX ORDER — QUETIAPINE FUMARATE 100 MG/1
100 TABLET, FILM COATED ORAL
Status: DISCONTINUED | OUTPATIENT
Start: 2024-08-18 | End: 2024-08-19

## 2024-08-18 RX ORDER — CEFTRIAXONE 2 G/50ML
2000 INJECTION, SOLUTION INTRAVENOUS ONCE
Status: COMPLETED | OUTPATIENT
Start: 2024-08-18 | End: 2024-08-18

## 2024-08-18 RX ORDER — LOSARTAN POTASSIUM 50 MG/1
50 TABLET ORAL DAILY
Status: DISCONTINUED | OUTPATIENT
Start: 2024-08-18 | End: 2024-08-20 | Stop reason: HOSPADM

## 2024-08-18 RX ORDER — TIZANIDINE 2 MG/1
4 TABLET ORAL EVERY 8 HOURS PRN
Status: DISCONTINUED | OUTPATIENT
Start: 2024-08-18 | End: 2024-08-20

## 2024-08-18 RX ORDER — IPRATROPIUM BROMIDE AND ALBUTEROL SULFATE 2.5; .5 MG/3ML; MG/3ML
3 SOLUTION RESPIRATORY (INHALATION) EVERY 6 HOURS PRN
Status: DISCONTINUED | OUTPATIENT
Start: 2024-08-18 | End: 2024-08-20

## 2024-08-18 RX ORDER — FLUTICASONE FUROATE AND VILANTEROL 100; 25 UG/1; UG/1
1 POWDER RESPIRATORY (INHALATION) DAILY
Status: DISCONTINUED | OUTPATIENT
Start: 2024-08-18 | End: 2024-08-20 | Stop reason: HOSPADM

## 2024-08-18 RX ORDER — BACLOFEN 10 MG/1
10 TABLET ORAL ONCE
Status: COMPLETED | OUTPATIENT
Start: 2024-08-18 | End: 2024-08-18

## 2024-08-18 RX ORDER — SODIUM CHLORIDE 9 MG/ML
100 INJECTION, SOLUTION INTRAVENOUS CONTINUOUS
Status: DISCONTINUED | OUTPATIENT
Start: 2024-08-18 | End: 2024-08-19

## 2024-08-18 RX ORDER — BUSPIRONE HYDROCHLORIDE 10 MG/1
10 TABLET ORAL 2 TIMES DAILY
Status: DISCONTINUED | OUTPATIENT
Start: 2024-08-18 | End: 2024-08-20 | Stop reason: HOSPADM

## 2024-08-18 RX ORDER — LIDOCAINE 50 MG/G
1 PATCH TOPICAL DAILY
Status: DISCONTINUED | OUTPATIENT
Start: 2024-08-18 | End: 2024-08-18

## 2024-08-18 RX ORDER — LIDOCAINE 50 MG/G
2 PATCH TOPICAL DAILY
Status: DISCONTINUED | OUTPATIENT
Start: 2024-08-19 | End: 2024-08-18

## 2024-08-18 RX ORDER — TAMSULOSIN HYDROCHLORIDE 0.4 MG/1
0.4 CAPSULE ORAL
Status: DISCONTINUED | OUTPATIENT
Start: 2024-08-18 | End: 2024-08-19

## 2024-08-18 RX ORDER — DIGOXIN 125 MCG
250 TABLET ORAL DAILY
Status: DISCONTINUED | OUTPATIENT
Start: 2024-08-18 | End: 2024-08-20 | Stop reason: HOSPADM

## 2024-08-18 RX ORDER — INSULIN GLARGINE 100 [IU]/ML
45 INJECTION, SOLUTION SUBCUTANEOUS EVERY 12 HOURS SCHEDULED
Status: DISCONTINUED | OUTPATIENT
Start: 2024-08-18 | End: 2024-08-20 | Stop reason: HOSPADM

## 2024-08-18 RX ORDER — GABAPENTIN 400 MG/1
800 CAPSULE ORAL 4 TIMES DAILY
Status: DISCONTINUED | OUTPATIENT
Start: 2024-08-18 | End: 2024-08-20 | Stop reason: HOSPADM

## 2024-08-18 RX ORDER — INSULIN LISPRO 100 [IU]/ML
10 INJECTION, SOLUTION INTRAVENOUS; SUBCUTANEOUS
Status: DISCONTINUED | OUTPATIENT
Start: 2024-08-18 | End: 2024-08-20 | Stop reason: HOSPADM

## 2024-08-18 RX ORDER — BACLOFEN 10 MG/1
10 TABLET ORAL 3 TIMES DAILY PRN
Status: DISCONTINUED | OUTPATIENT
Start: 2024-08-18 | End: 2024-08-18

## 2024-08-18 RX ADMIN — GABAPENTIN 800 MG: 400 CAPSULE ORAL at 17:20

## 2024-08-18 RX ADMIN — APIXABAN 5 MG: 5 TABLET, FILM COATED ORAL at 09:50

## 2024-08-18 RX ADMIN — BUMETANIDE 1 MG: 0.25 INJECTION INTRAMUSCULAR; INTRAVENOUS at 13:41

## 2024-08-18 RX ADMIN — QUETIAPINE FUMARATE 200 MG: 100 TABLET ORAL at 22:59

## 2024-08-18 RX ADMIN — SODIUM CHLORIDE 100 ML/HR: 0.9 INJECTION, SOLUTION INTRAVENOUS at 09:59

## 2024-08-18 RX ADMIN — SERTRALINE HYDROCHLORIDE 50 MG: 50 TABLET ORAL at 09:50

## 2024-08-18 RX ADMIN — DOCUSATE SODIUM 100 MG: 100 CAPSULE, LIQUID FILLED ORAL at 17:20

## 2024-08-18 RX ADMIN — Medication 1 TABLET: at 09:50

## 2024-08-18 RX ADMIN — BUSPIRONE HYDROCHLORIDE 10 MG: 10 TABLET ORAL at 17:22

## 2024-08-18 RX ADMIN — DOCUSATE SODIUM 100 MG: 100 CAPSULE, LIQUID FILLED ORAL at 09:50

## 2024-08-18 RX ADMIN — AZITHROMYCIN MONOHYDRATE 500 MG: 500 INJECTION, POWDER, LYOPHILIZED, FOR SOLUTION INTRAVENOUS at 10:20

## 2024-08-18 RX ADMIN — CEFTRIAXONE 2000 MG: 2 INJECTION, SOLUTION INTRAVENOUS at 07:31

## 2024-08-18 RX ADMIN — SODIUM CHLORIDE 1 G: 1 TABLET ORAL at 09:50

## 2024-08-18 RX ADMIN — GABAPENTIN 800 MG: 400 CAPSULE ORAL at 22:19

## 2024-08-18 RX ADMIN — LAMOTRIGINE 25 MG: 25 TABLET ORAL at 22:19

## 2024-08-18 RX ADMIN — INSULIN GLARGINE 45 UNITS: 100 INJECTION, SOLUTION SUBCUTANEOUS at 22:23

## 2024-08-18 RX ADMIN — BACLOFEN 10 MG: 10 TABLET ORAL at 08:51

## 2024-08-18 RX ADMIN — UMECLIDINIUM 1 PUFF: 62.5 AEROSOL, POWDER ORAL at 10:21

## 2024-08-18 RX ADMIN — GABAPENTIN 800 MG: 400 CAPSULE ORAL at 11:51

## 2024-08-18 RX ADMIN — SODIUM CHLORIDE 100 ML/HR: 0.9 INJECTION, SOLUTION INTRAVENOUS at 23:07

## 2024-08-18 RX ADMIN — ACETAMINOPHEN 975 MG: 325 TABLET ORAL at 06:13

## 2024-08-18 RX ADMIN — DIGOXIN 250 MCG: 125 TABLET ORAL at 09:50

## 2024-08-18 RX ADMIN — INSULIN HUMAN 10 UNITS: 100 INJECTION, SOLUTION PARENTERAL at 07:33

## 2024-08-18 RX ADMIN — QUETIAPINE FUMARATE 100 MG: 100 TABLET ORAL at 22:19

## 2024-08-18 RX ADMIN — BUSPIRONE HYDROCHLORIDE 10 MG: 10 TABLET ORAL at 09:50

## 2024-08-18 RX ADMIN — APIXABAN 5 MG: 5 TABLET, FILM COATED ORAL at 17:20

## 2024-08-18 RX ADMIN — OXYCODONE HYDROCHLORIDE AND ACETAMINOPHEN 2 TABLET: 5; 325 TABLET ORAL at 18:14

## 2024-08-18 RX ADMIN — METOPROLOL SUCCINATE 200 MG: 100 TABLET, EXTENDED RELEASE ORAL at 09:50

## 2024-08-18 RX ADMIN — LOSARTAN POTASSIUM 50 MG: 50 TABLET, FILM COATED ORAL at 09:50

## 2024-08-18 RX ADMIN — TIZANIDINE 4 MG: 2 TABLET ORAL at 20:15

## 2024-08-18 RX ADMIN — SPIRONOLACTONE 25 MG: 25 TABLET ORAL at 09:50

## 2024-08-18 RX ADMIN — TAMSULOSIN HYDROCHLORIDE 0.4 MG: 0.4 CAPSULE ORAL at 16:40

## 2024-08-18 RX ADMIN — INSULIN LISPRO 10 UNITS: 100 INJECTION, SOLUTION INTRAVENOUS; SUBCUTANEOUS at 11:52

## 2024-08-18 RX ADMIN — INSULIN LISPRO 10 UNITS: 100 INJECTION, SOLUTION INTRAVENOUS; SUBCUTANEOUS at 17:21

## 2024-08-18 RX ADMIN — FLUTICASONE FUROATE AND VILANTEROL TRIFENATATE 1 PUFF: 100; 25 POWDER RESPIRATORY (INHALATION) at 10:20

## 2024-08-18 RX ADMIN — LIDOCAINE 5% 2 PATCH: 700 PATCH TOPICAL at 13:41

## 2024-08-18 RX ADMIN — HYDROMORPHONE HYDROCHLORIDE 0.2 MG: 0.2 INJECTION, SOLUTION INTRAMUSCULAR; INTRAVENOUS; SUBCUTANEOUS at 16:38

## 2024-08-18 RX ADMIN — OXYCODONE HYDROCHLORIDE 5 MG: 5 TABLET ORAL at 09:50

## 2024-08-18 NOTE — ASSESSMENT & PLAN NOTE
New visual hallucinations onset, history of CLL, not currently receiving treatment. Increased neck pain and tenderness.      -LA 3.6  -WBC 33.54  -VBG negative     Head CT  Trend WBC and LA  Monitor for any mental status changes

## 2024-08-18 NOTE — ASSESSMENT & PLAN NOTE
Lab Results   Component Value Date    HGBA1C 11.2 (H) 08/19/2024     Chronic, UNCONTROLLED. Prior A1c 9.7 on 4/2/24.    Home medication includes gabapentin 800 mg 4 times daily, glargine 50 units twice daily and lispro 20 units 3 times daily with meals.     ED: Glucose was 302 and received 10 units regular insulin    Continue home medications   Diabetic diet   ISS  Continue home gabapentin 800 mg 4 times daily

## 2024-08-18 NOTE — ASSESSMENT & PLAN NOTE
>>ASSESSMENT AND PLAN FOR CHRONIC OBSTRUCTIVE PULMONARY DISEASE, UNSPECIFIED COPD TYPE (HCC) WRITTEN ON 8/18/2024  8:35 AM BY MYKE GRAHAM MD    Chronic    Follows up with pulmonology.  Home medications include Trelegy daily, DuoNebs and Ventolin as needed.     Continue home medications

## 2024-08-18 NOTE — ASSESSMENT & PLAN NOTE
History of CLL, follows with hematology oncology.  Patient was open to beginning IVIG treatment in April, but in insurance does not cover.    -Echo 2024: EF 57%, no significant changes since last echo 2022    Follow up with heme/onc outpatient

## 2024-08-18 NOTE — PLAN OF CARE
Problem: Potential for Falls  Goal: Patient will remain free of falls  Description: INTERVENTIONS:  - Educate patient/family on patient safety including physical limitations  - Instruct patient to call for assistance with activity   - Consult OT/PT to assist with strengthening/mobility   - Keep Call bell within reach  - Keep bed low and locked with side rails adjusted as appropriate  - Keep care items and personal belongings within reach  - Initiate and maintain comfort rounds  - Make Fall Risk Sign visible to staff  - Offer Toileting every 2 Hours, in advance of need  - Initiate/Maintain bed alarm  - Obtain necessary fall risk management equipment: walker   - Apply yellow socks and bracelet for high fall risk patients  - Consider moving patient to room near nurses station  Outcome: Progressing     Problem: PAIN - ADULT  Goal: Verbalizes/displays adequate comfort level or baseline comfort level  Description: Interventions:  - Encourage patient to monitor pain and request assistance  - Assess pain using appropriate pain scale  - Administer analgesics based on type and severity of pain and evaluate response  - Implement non-pharmacological measures as appropriate and evaluate response  - Consider cultural and social influences on pain and pain management  - Notify physician/advanced practitioner if interventions unsuccessful or patient reports new pain  Outcome: Progressing     Problem: INFECTION - ADULT  Goal: Absence or prevention of progression during hospitalization  Description: INTERVENTIONS:  - Assess and monitor for signs and symptoms of infection  - Monitor lab/diagnostic results  - Monitor all insertion sites, i.e. indwelling lines, tubes, and drains  - Monitor endotracheal if appropriate and nasal secretions for changes in amount and color  - [place] appropriate cooling/warming therapies per order  - Administer medications as ordered  - Instruct and encourage patient and family to use good hand hygiene  technique  - Identify and instruct in appropriate isolation precautions for identified infection/condition  Outcome: Progressing  Goal: Absence of fever/infection during neutropenic period  Description: INTERVENTIONS:  - Monitor WBC    Outcome: Progressing     Problem: SAFETY ADULT  Goal: Patient will remain free of falls  Description: INTERVENTIONS:  - Educate patient/family on patient safety including physical limitations  - Instruct patient to call for assistance with activity   - Consult OT/PT to assist with strengthening/mobility   - Keep Call bell within reach  - Keep bed low and locked with side rails adjusted as appropriate  - Keep care items and personal belongings within reach  - Initiate and maintain comfort rounds  - Make Fall Risk Sign visible to staff  - Offer Toileting every 2 Hours, in advance of need  - Initiate/Maintain bed alarm  - Obtain necessary fall risk management equipment: walker   - Apply yellow socks and bracelet for high fall risk patients  - Consider moving patient to room near nurses station  Outcome: Progressing  Goal: Maintain or return to baseline ADL function  Description: INTERVENTIONS:  -  Assess patient's ability to carry out ADLs; assess patient's baseline for ADL function and identify physical deficits which impact ability to perform ADLs (bathing, care of mouth/teeth, toileting, grooming, dressing, etc.)  - Assess/evaluate cause of self-care deficits   - Assess range of motion  - Assess patient's mobility; develop plan if impaired  - Assess patient's need for assistive devices and provide as appropriate  - Encourage maximum independence but intervene and supervise when necessary  - Involve family in performance of ADLs  - Assess for home care needs following discharge   - Consider OT consult to assist with ADL evaluation and planning for discharge  - Provide patient education as appropriate  Outcome: Progressing  Goal: Maintains/Returns to pre admission functional  level  Description: INTERVENTIONS:  - Perform AM-PAC 6 Click Basic Mobility/ Daily Activity assessment daily.  - Set and communicate daily mobility goal to care team and patient/family/caregiver.   - Collaborate with rehabilitation services on mobility goals if consulted  - Perform Range of Motion 3 times a day.  - Reposition patient every 2 hours.  - Dangle patient 3 times a day  - Stand patient 3 times a day  - Ambulate patient 3 times a day  - Out of bed to chair 3 times a day   - Out of bed for meals 3 times a day  - Out of bed for toileting  - Record patient progress and toleration of activity level   Outcome: Progressing     Problem: DISCHARGE PLANNING  Goal: Discharge to home or other facility with appropriate resources  Description: INTERVENTIONS:  - Identify barriers to discharge w/patient and caregiver  - Arrange for needed discharge resources and transportation as appropriate  - Identify discharge learning needs (meds, wound care, etc.)  - Arrange for interpretive services to assist at discharge as needed  - Refer to Case Management Department for coordinating discharge planning if the patient needs post-hospital services based on physician/advanced practitioner order or complex needs related to functional status, cognitive ability, or social support system  Outcome: Progressing     Problem: Knowledge Deficit  Goal: Patient/family/caregiver demonstrates understanding of disease process, treatment plan, medications, and discharge instructions  Description: Complete learning assessment and assess knowledge base.  Interventions:  - Provide teaching at level of understanding  - Provide teaching via preferred learning methods  Outcome: Progressing     Problem: NEUROSENSORY - ADULT  Goal: Achieves stable or improved neurological status  Description: INTERVENTIONS  - Monitor and report changes in neurological status  - Monitor vital signs such as temperature, blood pressure, glucose, and any other labs ordered    - Initiate measures to prevent increased intracranial pressure  - Monitor for seizure activity and implement precautions if appropriate      Outcome: Progressing  Goal: Remains free of injury related to seizures activity  Description: INTERVENTIONS  - Maintain airway, patient safety  and administer oxygen as ordered  - Monitor patient for seizure activity, document and report duration and description of seizure to physician/advanced practitioner  - If seizure occurs,  ensure patient safety during seizure  - Reorient patient post seizure  - Seizure pads on all 4 side rails  - Instruct patient/family to notify RN of any seizure activity including if an aura is experienced  - Instruct patient/family to call for assistance with activity based on nursing assessment  - Administer anti-seizure medications if ordered    Outcome: Progressing  Goal: Achieves maximal functionality and self care  Description: INTERVENTIONS  - Monitor swallowing and airway patency with patient fatigue and changes in neurological status  - Encourage and assist patient to increase activity and self care.   - Encourage visually impaired, hearing impaired and aphasic patients to use assistive/communication devices  Outcome: Progressing

## 2024-08-18 NOTE — ASSESSMENT & PLAN NOTE
Uncontrolled diabetes with Ha1c 11.2. 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     Continue crocker usage   Urology f/u outpatient for urodynamic flex cystoscopy   Continue Flomax 0.8 mg daily

## 2024-08-18 NOTE — ASSESSMENT & PLAN NOTE
Chronic    Follows up with pulmonology.  Home medications include Trelegy daily, DuoNebs and Ventolin as needed.     Continue home medications

## 2024-08-18 NOTE — ASSESSMENT & PLAN NOTE
History of bipolar dx, depression with anxiety.  Home regimen: BUSPAR 10mg BID, Zoloft 50mg daily, Seroquel 300 mg daily at bedtime.     Continue home medications

## 2024-08-18 NOTE — ASSESSMENT & PLAN NOTE
Chronic, stable. At home on Digoxin 0.25mcg PO daily & Metoprolol 200mg PO QD & Eliquis 5 mg BID.    -EKG: a-fib, no changes     Continue home medications

## 2024-08-18 NOTE — ASSESSMENT & PLAN NOTE
Wt Readings from Last 3 Encounters:   04/08/24 103 kg (228 lb)   02/27/24 108 kg (238 lb 1.6 oz)   02/06/24 108 kg (238 lb)     Chronic; stable.    Home medications includes spironolactone 25 mg daily, metoprolol 200 mg daily and Bumex 1 mg as needed.  Last echo of 2024 showed EF 57%.    Continue home medications.   Hospital course: spironolactone 25 mg daily & 1 time Bumex 1 mg given.

## 2024-08-18 NOTE — ASSESSMENT & PLAN NOTE
Chronic, stable.    Home medication includes metoprolol 200 daily. Lipitor and ASA?    Continue home medications

## 2024-08-18 NOTE — ED PROVIDER NOTES
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 (DUO-NEB) 0.5-2.5 mg/3 mL nebulizer solution   No No   Sig: Take 3 mL by nebulization every 6 (six) hours as needed for wheezing or shortness of breath   lamoTRIgine (LaMICtal) 25 mg tablet  Self Yes No   Si mg daily at bedtime   losartan (COZAAR) 50 mg tablet   No No   Sig: Take 1 tablet (50 mg total) by mouth daily   metoprolol succinate (TOPROL-XL) 200 MG 24 hr tablet   No No   Sig: Take 1 tablet (200 mg total) by mouth daily   naloxone (NARCAN) 4 mg/0.1 mL nasal spray   No No   Sig: Administer 1 spray into a nostril. If no response after 2-3 minutes, give another dose in the other nostril using a new spray.   oxyCODONE-acetaminophen (Percocet) 5-325 mg per tablet   No No   Sig: Take 2 tablets by mouth every 8 (eight) hours as needed for moderate pain Max Daily Amount: 6 tablets   sertraline (ZOLOFT) 50 mg tablet  Self No No   Sig: Take 1 tablet (50 mg total) by mouth daily Daily at bedtime   sodium chloride 1 g tablet   No No   Sig: TAKE 1 TABLET (1 G TOTAL) BY MOUTH EVERY MORNING   sodium chloride 1 g tablet   No No   Sig: Take 1 tablet (1 g total) by mouth every morning   spironolactone (ALDACTONE) 25 mg tablet   No No   Sig: TAKE 1 TABLET BY MOUTH DAILY   tiZANidine (ZANAFLEX) 4 mg tablet   No No   Sig: Take 1 tablet (4 mg total)  "by mouth every 8 (eight) hours as needed for muscle spasms      Facility-Administered Medications: None       Portions of the record may have been created with voice recognition software. Occasional wrong word or \"sound a like\" substitutions may have occurred due to the inherent limitations of voice recognition software. Read the chart carefully and recognize, using context, where substitutions have occurred.    Electronically signed by:  MD Peewee Cody MD  08/18/24 0733    "
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
## 2024-08-18 NOTE — ASSESSMENT & PLAN NOTE
Worsening diffuse tenderness along upper and lower back. Limited range of motion secondary to increased pain upon movement.     -Spinal CT (cervical, thoracic, lumbar): negative     Follow up with PT outpatient   [facility] for [facility]  Heating/cooling packs as needed  Pain Management: oxyCODONE-acetaminophen (PERCOCET) 5-325 mg per tablet 2 tablet for moderate pain q8 PRN

## 2024-08-18 NOTE — ASSESSMENT & PLAN NOTE
Patient with CLL and history of leukocytosis.  Follows up with hematology-oncology outpatient. Last visit was in April where he agreed to begin IVIG, insurance does not cover.    -POA: WBC 33.54, LA 3.6.  -8/20: WBC 23.41  -8/19: LA 1.4    Follow up outpatient heme/onc for further CLL management plan

## 2024-08-18 NOTE — ASSESSMENT & PLAN NOTE
Lab Results   Component Value Date    EGFR 62 08/18/2024    EGFR 100 05/20/2024    EGFR 96 04/15/2024    CREATININE 1.22 08/18/2024    CREATININE 0.69 05/20/2024    CREATININE 0.75 04/15/2024     Chronic, stable.     Continue to monitor  Avoid nephrotoxic agents

## 2024-08-18 NOTE — ASSESSMENT & PLAN NOTE
Chronic; stable    Home medication include Cozaar 50 mg daily and metoprolol 200 mg daily.     Continue home medication  Continue to monitor blood pressure

## 2024-08-19 ENCOUNTER — APPOINTMENT (INPATIENT)
Dept: NON INVASIVE DIAGNOSTICS | Facility: HOSPITAL | Age: 64
DRG: 551 | End: 2024-08-19
Payer: COMMERCIAL

## 2024-08-19 PROBLEM — R44.1 VISUAL HALLUCINATION: Status: RESOLVED | Noted: 2024-08-18 | Resolved: 2024-08-19

## 2024-08-19 LAB
ANION GAP SERPL CALCULATED.3IONS-SCNC: 8 MMOL/L (ref 4–13)
AORTIC ROOT: 3.7 CM
APICAL FOUR CHAMBER EJECTION FRACTION: 56 %
AV LVOT PEAK GRADIENT: 3 MMHG
AV PEAK GRADIENT: 6 MMHG
BASOPHILS # BLD AUTO: 0.06 THOUSANDS/ÂΜL (ref 0–0.1)
BASOPHILS NFR BLD AUTO: 0 % (ref 0–1)
BSA FOR ECHO PROCEDURE: 2.21 M2
BUN SERPL-MCNC: 14 MG/DL (ref 5–25)
CALCIUM SERPL-MCNC: 9.3 MG/DL (ref 8.4–10.2)
CHLORIDE SERPL-SCNC: 100 MMOL/L (ref 96–108)
CO2 SERPL-SCNC: 28 MMOL/L (ref 21–32)
CREAT SERPL-MCNC: 0.72 MG/DL (ref 0.6–1.3)
DOP CALC LVOT AREA: 3.46 CM2
DOP CALC LVOT DIAMETER: 2.1 CM
E WAVE DECELERATION TIME: 151 MS
E/A RATIO: 54
EOSINOPHIL # BLD AUTO: 0.1 THOUSAND/ÂΜL (ref 0–0.61)
EOSINOPHIL NFR BLD AUTO: 0 % (ref 0–6)
ERYTHROCYTE [DISTWIDTH] IN BLOOD BY AUTOMATED COUNT: 13.4 % (ref 11.6–15.1)
EST. AVERAGE GLUCOSE BLD GHB EST-MCNC: 275 MG/DL
FRACTIONAL SHORTENING: 31 (ref 28–44)
GFR SERPL CREATININE-BSD FRML MDRD: 98 ML/MIN/1.73SQ M
GLUCOSE SERPL-MCNC: 102 MG/DL (ref 65–140)
GLUCOSE SERPL-MCNC: 106 MG/DL (ref 65–140)
HBA1C MFR BLD: 11.2 %
HCT VFR BLD AUTO: 44 % (ref 36.5–49.3)
HGB BLD-MCNC: 14.8 G/DL (ref 12–17)
IMM GRANULOCYTES # BLD AUTO: 0.06 THOUSAND/UL (ref 0–0.2)
IMM GRANULOCYTES NFR BLD AUTO: 0 % (ref 0–2)
INTERVENTRICULAR SEPTUM IN DIASTOLE (PARASTERNAL SHORT AXIS VIEW): 1.3 CM
INTERVENTRICULAR SEPTUM: 1.3 CM (ref 0.6–1.1)
LAAS-AP2: 37.1 CM2
LAAS-AP4: 38.8 CM2
LACTATE SERPL-SCNC: 1.4 MMOL/L (ref 0.5–2)
LEFT ATRIUM SIZE: 5.3 CM
LEFT ATRIUM VOLUME (MOD BIPLANE): 146 ML
LEFT ATRIUM VOLUME INDEX (MOD BIPLANE): 66.1 ML/M2
LEFT INTERNAL DIMENSION IN SYSTOLE: 3.3 CM (ref 2.1–4)
LEFT VENTRICULAR INTERNAL DIMENSION IN DIASTOLE: 4.8 CM (ref 3.5–6)
LEFT VENTRICULAR POSTERIOR WALL IN END DIASTOLE: 1.2 CM
LEFT VENTRICULAR STROKE VOLUME: 61 ML
LVSV (TEICH): 61 ML
LYMPHOCYTES # BLD AUTO: 23.47 THOUSANDS/ÂΜL (ref 0.6–4.47)
LYMPHOCYTES NFR BLD AUTO: 84 % (ref 14–44)
MAGNESIUM SERPL-MCNC: 2 MG/DL (ref 1.9–2.7)
MCH RBC QN AUTO: 31.3 PG (ref 26.8–34.3)
MCHC RBC AUTO-ENTMCNC: 33.6 G/DL (ref 31.4–37.4)
MCV RBC AUTO: 93 FL (ref 82–98)
MONOCYTES # BLD AUTO: 0.53 THOUSAND/ÂΜL (ref 0.17–1.22)
MONOCYTES NFR BLD AUTO: 2 % (ref 4–12)
MV E'TISSUE VEL-SEP: 9 CM/S
MV PEAK A VEL: 0.02 M/S
MV PEAK E VEL: 108 CM/S
MV STENOSIS PRESSURE HALF TIME: 44 MS
MV VALVE AREA P 1/2 METHOD: 5
NEUTROPHILS # BLD AUTO: 3.81 THOUSANDS/ÂΜL (ref 1.85–7.62)
NEUTS SEG NFR BLD AUTO: 14 % (ref 43–75)
NRBC BLD AUTO-RTO: 0 /100 WBCS
PLATELET # BLD AUTO: 166 THOUSANDS/UL (ref 149–390)
PMV BLD AUTO: 9.1 FL (ref 8.9–12.7)
POTASSIUM SERPL-SCNC: 4 MMOL/L (ref 3.5–5.3)
RBC # BLD AUTO: 4.73 MILLION/UL (ref 3.88–5.62)
RIGHT ATRIUM AREA SYSTOLE A4C: 22.9 CM2
RIGHT VENTRICLE ID DIMENSION: 4.5 CM
SL CV LEFT ATRIUM LENGTH A2C: 7.7 CM
SL CV LV EF: 57
SL CV PED ECHO LEFT VENTRICLE DIASTOLIC VOLUME (MOD BIPLANE) 2D: 106 ML
SL CV PED ECHO LEFT VENTRICLE SYSTOLIC VOLUME (MOD BIPLANE) 2D: 45 ML
SODIUM SERPL-SCNC: 136 MMOL/L (ref 135–147)
TR MAX PG: 37 MMHG
TR PEAK VELOCITY: 3 M/S
TRICUSPID ANNULAR PLANE SYSTOLIC EXCURSION: 1.9 CM
TRICUSPID VALVE PEAK REGURGITATION VELOCITY: 3.04 M/S
WBC # BLD AUTO: 28.03 THOUSAND/UL (ref 4.31–10.16)

## 2024-08-19 PROCEDURE — 93306 TTE W/DOPPLER COMPLETE: CPT

## 2024-08-19 PROCEDURE — 97110 THERAPEUTIC EXERCISES: CPT

## 2024-08-19 PROCEDURE — 94640 AIRWAY INHALATION TREATMENT: CPT

## 2024-08-19 PROCEDURE — 83036 HEMOGLOBIN GLYCOSYLATED A1C: CPT

## 2024-08-19 PROCEDURE — 97167 OT EVAL HIGH COMPLEX 60 MIN: CPT

## 2024-08-19 PROCEDURE — 99222 1ST HOSP IP/OBS MODERATE 55: CPT | Performed by: PHYSICIAN ASSISTANT

## 2024-08-19 PROCEDURE — 94664 DEMO&/EVAL PT USE INHALER: CPT

## 2024-08-19 PROCEDURE — 93306 TTE W/DOPPLER COMPLETE: CPT | Performed by: INTERNAL MEDICINE

## 2024-08-19 PROCEDURE — 80048 BASIC METABOLIC PNL TOTAL CA: CPT

## 2024-08-19 PROCEDURE — 94760 N-INVAS EAR/PLS OXIMETRY 1: CPT

## 2024-08-19 PROCEDURE — 82948 REAGENT STRIP/BLOOD GLUCOSE: CPT

## 2024-08-19 PROCEDURE — 83605 ASSAY OF LACTIC ACID: CPT

## 2024-08-19 PROCEDURE — 97163 PT EVAL HIGH COMPLEX 45 MIN: CPT

## 2024-08-19 PROCEDURE — 85025 COMPLETE CBC W/AUTO DIFF WBC: CPT

## 2024-08-19 PROCEDURE — 83735 ASSAY OF MAGNESIUM: CPT

## 2024-08-19 PROCEDURE — 99233 SBSQ HOSP IP/OBS HIGH 50: CPT | Performed by: STUDENT IN AN ORGANIZED HEALTH CARE EDUCATION/TRAINING PROGRAM

## 2024-08-19 RX ORDER — OXYCODONE AND ACETAMINOPHEN 5; 325 MG/1; MG/1
2 TABLET ORAL EVERY 6 HOURS PRN
Status: DISCONTINUED | OUTPATIENT
Start: 2024-08-19 | End: 2024-08-20 | Stop reason: HOSPADM

## 2024-08-19 RX ORDER — TAMSULOSIN HYDROCHLORIDE 0.4 MG/1
0.8 CAPSULE ORAL
Status: DISCONTINUED | OUTPATIENT
Start: 2024-08-19 | End: 2024-08-20 | Stop reason: HOSPADM

## 2024-08-19 RX ORDER — INSULIN LISPRO 100 [IU]/ML
1-6 INJECTION, SOLUTION INTRAVENOUS; SUBCUTANEOUS
Status: DISCONTINUED | OUTPATIENT
Start: 2024-08-20 | End: 2024-08-19

## 2024-08-19 RX ORDER — QUETIAPINE FUMARATE 100 MG/1
400 TABLET, FILM COATED ORAL
Status: DISCONTINUED | OUTPATIENT
Start: 2024-08-19 | End: 2024-08-19

## 2024-08-19 RX ORDER — HYDROMORPHONE HCL IN WATER/PF 6 MG/30 ML
0.2 PATIENT CONTROLLED ANALGESIA SYRINGE INTRAVENOUS ONCE AS NEEDED
Status: DISCONTINUED | OUTPATIENT
Start: 2024-08-19 | End: 2024-08-19

## 2024-08-19 RX ORDER — OXYCODONE AND ACETAMINOPHEN 5; 325 MG/1; MG/1
2 TABLET ORAL EVERY 8 HOURS PRN
Status: DISCONTINUED | OUTPATIENT
Start: 2024-08-19 | End: 2024-08-19

## 2024-08-19 RX ORDER — QUETIAPINE FUMARATE 100 MG/1
300 TABLET, FILM COATED ORAL
Status: DISCONTINUED | OUTPATIENT
Start: 2024-08-19 | End: 2024-08-19

## 2024-08-19 RX ORDER — INSULIN LISPRO 100 [IU]/ML
1-6 INJECTION, SOLUTION INTRAVENOUS; SUBCUTANEOUS
Status: DISCONTINUED | OUTPATIENT
Start: 2024-08-19 | End: 2024-08-20 | Stop reason: HOSPADM

## 2024-08-19 RX ORDER — QUETIAPINE FUMARATE 100 MG/1
300 TABLET, FILM COATED ORAL
Status: DISCONTINUED | OUTPATIENT
Start: 2024-08-19 | End: 2024-08-20 | Stop reason: HOSPADM

## 2024-08-19 RX ORDER — INSULIN LISPRO 100 [IU]/ML
1-5 INJECTION, SOLUTION INTRAVENOUS; SUBCUTANEOUS
Status: DISCONTINUED | OUTPATIENT
Start: 2024-08-19 | End: 2024-08-19

## 2024-08-19 RX ORDER — QUETIAPINE FUMARATE 100 MG/1
100 TABLET, FILM COATED ORAL DAILY
Status: DISCONTINUED | OUTPATIENT
Start: 2024-08-20 | End: 2024-08-19

## 2024-08-19 RX ADMIN — INSULIN GLARGINE 45 UNITS: 100 INJECTION, SOLUTION SUBCUTANEOUS at 09:37

## 2024-08-19 RX ADMIN — GABAPENTIN 800 MG: 400 CAPSULE ORAL at 21:39

## 2024-08-19 RX ADMIN — SPIRONOLACTONE 25 MG: 25 TABLET ORAL at 09:36

## 2024-08-19 RX ADMIN — TIZANIDINE 4 MG: 2 TABLET ORAL at 11:53

## 2024-08-19 RX ADMIN — SERTRALINE HYDROCHLORIDE 50 MG: 50 TABLET ORAL at 09:36

## 2024-08-19 RX ADMIN — OXYCODONE HYDROCHLORIDE AND ACETAMINOPHEN 2 TABLET: 5; 325 TABLET ORAL at 15:18

## 2024-08-19 RX ADMIN — Medication 1 TABLET: at 09:36

## 2024-08-19 RX ADMIN — GABAPENTIN 800 MG: 400 CAPSULE ORAL at 09:36

## 2024-08-19 RX ADMIN — GABAPENTIN 800 MG: 400 CAPSULE ORAL at 17:23

## 2024-08-19 RX ADMIN — LAMOTRIGINE 25 MG: 25 TABLET ORAL at 21:39

## 2024-08-19 RX ADMIN — GABAPENTIN 800 MG: 400 CAPSULE ORAL at 11:53

## 2024-08-19 RX ADMIN — QUETIAPINE FUMARATE 300 MG: 100 TABLET ORAL at 21:39

## 2024-08-19 RX ADMIN — DIGOXIN 250 MCG: 125 TABLET ORAL at 09:37

## 2024-08-19 RX ADMIN — HYDROMORPHONE HYDROCHLORIDE 0.5 MG: 1 INJECTION, SOLUTION INTRAMUSCULAR; INTRAVENOUS; SUBCUTANEOUS at 19:55

## 2024-08-19 RX ADMIN — BUSPIRONE HYDROCHLORIDE 10 MG: 10 TABLET ORAL at 09:36

## 2024-08-19 RX ADMIN — HYDROMORPHONE HYDROCHLORIDE 0.5 MG: 1 INJECTION, SOLUTION INTRAMUSCULAR; INTRAVENOUS; SUBCUTANEOUS at 11:53

## 2024-08-19 RX ADMIN — HYDROMORPHONE HYDROCHLORIDE 0.5 MG: 1 INJECTION, SOLUTION INTRAMUSCULAR; INTRAVENOUS; SUBCUTANEOUS at 00:27

## 2024-08-19 RX ADMIN — LOSARTAN POTASSIUM 50 MG: 50 TABLET, FILM COATED ORAL at 09:36

## 2024-08-19 RX ADMIN — LIDOCAINE 5% 2 PATCH: 700 PATCH TOPICAL at 09:37

## 2024-08-19 RX ADMIN — OXYCODONE HYDROCHLORIDE AND ACETAMINOPHEN 2 TABLET: 5; 325 TABLET ORAL at 09:36

## 2024-08-19 RX ADMIN — SODIUM CHLORIDE 1 G: 1 TABLET ORAL at 09:37

## 2024-08-19 RX ADMIN — APIXABAN 5 MG: 5 TABLET, FILM COATED ORAL at 09:37

## 2024-08-19 RX ADMIN — INSULIN LISPRO 10 UNITS: 100 INJECTION, SOLUTION INTRAVENOUS; SUBCUTANEOUS at 09:43

## 2024-08-19 RX ADMIN — OXYCODONE HYDROCHLORIDE AND ACETAMINOPHEN 2 TABLET: 5; 325 TABLET ORAL at 05:15

## 2024-08-19 RX ADMIN — INSULIN LISPRO 10 UNITS: 100 INJECTION, SOLUTION INTRAVENOUS; SUBCUTANEOUS at 12:22

## 2024-08-19 RX ADMIN — FLUTICASONE FUROATE AND VILANTEROL TRIFENATATE 1 PUFF: 100; 25 POWDER RESPIRATORY (INHALATION) at 09:43

## 2024-08-19 RX ADMIN — DOCUSATE SODIUM 100 MG: 100 CAPSULE, LIQUID FILLED ORAL at 09:36

## 2024-08-19 RX ADMIN — APIXABAN 5 MG: 5 TABLET, FILM COATED ORAL at 17:23

## 2024-08-19 RX ADMIN — METOPROLOL SUCCINATE 200 MG: 100 TABLET, EXTENDED RELEASE ORAL at 09:36

## 2024-08-19 RX ADMIN — UMECLIDINIUM 1 PUFF: 62.5 AEROSOL, POWDER ORAL at 09:43

## 2024-08-19 RX ADMIN — INSULIN LISPRO 10 UNITS: 100 INJECTION, SOLUTION INTRAVENOUS; SUBCUTANEOUS at 17:23

## 2024-08-19 RX ADMIN — DOCUSATE SODIUM 100 MG: 100 CAPSULE, LIQUID FILLED ORAL at 17:23

## 2024-08-19 RX ADMIN — BUSPIRONE HYDROCHLORIDE 10 MG: 10 TABLET ORAL at 17:23

## 2024-08-19 RX ADMIN — IPRATROPIUM BROMIDE AND ALBUTEROL SULFATE 3 ML: .5; 3 SOLUTION RESPIRATORY (INHALATION) at 21:52

## 2024-08-19 RX ADMIN — TAMSULOSIN HYDROCHLORIDE 0.8 MG: 0.4 CAPSULE ORAL at 15:21

## 2024-08-19 RX ADMIN — INSULIN GLARGINE 45 UNITS: 100 INJECTION, SOLUTION SUBCUTANEOUS at 21:39

## 2024-08-19 NOTE — CASE MANAGEMENT
Case Management Assessment & Discharge Planning Note    Patient name Alonzo Watson  Location [address] [address]/[address] [address]-* MRN [id number]  : 1960 Date 2024       Current Admission Date: 2024  Current Admission Diagnosis:Muscle spasms of both lower extremities   Patient Active Problem List    Diagnosis Date Noted Date Diagnosed    Leukocytosis 2024     Visual hallucination 2024     Urinary retention 2024     Sciatica of left side 2024     Muscle spasms of both lower extremities 2024     Food insecurity 2024     Chronic intractable pain 04/15/2024     Gastroparesis 2024     Irritable bowel syndrome with diarrhea 2024     Hx of adenomatous colonic polyps 2024     Chronic obstructive pulmonary disease, unspecified COPD type ([facility]) 2023     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     Unspecified lack of coordination 10/25/2023     Other symbolic dysfunctions 10/25/2023     Other abnormalities of gait and mobility 10/25/2023     Severe pain 2023     Morbid (severe) obesity with alveolar hypoventilation ([facility]) 2023     Acute sensory neuropathy 2023     Allergies 2023     Hepatosplenomegaly 2023     Ambulatory dysfunction 2023     Immunodeficiency, unspecified ([facility]) 2022     CLL (chronic lymphocytic leukemia) ([facility]) 2022     Hypo-osmolality and hyponatremia 2022     Chronic lymphocytic leukemia of B-cell type in remission ([facility]) 2022     Chronic obstructive pulmonary disease, unspecified ([facility]) 2022     Chronic pain syndrome 2022     Foraminal stenosis of lumbar region 2022     Lumbar post-laminectomy syndrome 2022     Lumbar radiculopathy 2022     Dysuria 2021     Other intervertebral disc degeneration, lumbar region 2021     Bipolar disorder ([facility]) 2021     Paroxysmal atrial fibrillation ([facility]) 2021     Chronic respiratory failure with hypoxia  ([facility]) 11/28/2021     Chronic kidney disease, stage 2 (mild) 11/28/2021     Atherosclerotic heart disease of native coronary artery without angina pectoris 11/28/2021     Peripheral neuropathy 11/26/2021     Muscle weakness (generalized) 09/21/2021     Platelets decreased ([facility]) 08/06/2021     Heart failure ([facility]) 11/17/2020     Nutritional anemia, unspecified  10/29/2020     Chest pain 10/11/2020     Transition of care performed with sharing of clinical summary 04/11/2020     Obstructive sleep apnea 02/02/2020     Chronic a-fib ([facility]) 02/02/2020     Type 2 diabetes mellitus with complication, with long-term current use of insulin ([facility]) 06/21/2019     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 ([facility]) 03/25/2019     Lung disease, restrictive 11/21/2018     Chronic respiratory failure ([facility]) 10/31/2018     Coronary artery disease 09/06/2017     Esophageal reflux 09/06/2017     Chronic low back pain 11/30/2016     SHAVONNE and COPD overlap syndrome       Morbid obesity       Fatigue 09/02/2016     Hyponatremia 09/02/2016     Psychiatric disorder      Erectile dysfunction of organic origin 08/25/2014     Essential hypertension 09/04/2012     Diabetic neuropathy ([facility]) 09/04/2012     Panic disorder without agoraphobia 09/04/2012       LOS (days): 1  Geometric Mean LOS (GMLOS) (days):   Days to GMLOS:     OBJECTIVE:    Risk of Unplanned Readmission Score: 30.42         Current admission status: Inpatient       Preferred Pharmacy:   [address] [address]  Phone: [phone] Fax: [phone]    Primary Care Provider: MANAS Payne    Primary Insurance: [employer]  Secondary Insurance:     ASSESSMENT:  Active Health Care Proxies    There are no active Health Care Proxies on file.                 Readmission Root Cause  30 Day Readmission: No    Patient Information  Admitted from::  Home  Mental Status: Alert  During Assessment patient was accompanied by: Not accompanied during assessment  Assessment information provided by:: Patient  Primary Caregiver: Self  Support Systems: Home care staff  County of Residence: [place]  What city do you live in?: [place]  Home entry access options. Select all that apply.: Elevator  Type of Current Residence: Apartment  Floor Level: 2  Upon entering residence, is there a bedroom on the main floor (no further steps)?: Yes  Upon entering residence, is there a bathroom on the main floor (no further steps)?: Yes  Living Arrangements: Lives Alone  Is patient a ?: No    Activities of Daily Living Prior to Admission  Functional Status: Independent  Completes ADLs independently?: No  Level of ADL dependence: Assistance  Ambulates independently?: Yes  Does patient use assisted devices?: Yes  Assisted Devices (DME) used: Walker, Wheelchair, Other (Comment) (Electric scooter)  Does patient currently own DME?: Yes  What DME does the patient currently own?: Walker, Wheelchair, Other (Comment) (Electric scooter)  Does patient have a history of Outpatient Therapy (PT/OT)?: No  Does the patient have a history of Short-Term Rehab?: Yes  Does patient have a history of HHC?: Yes  Does patient currently have HHC?: Yes    Current Home Health Care  Type of Current Home Care Services: Home health aide (Pt has home health aid 5 days per week for about 5-6 hours per day.)  Current Home Health Agency::  ([facility]  Current Home Health Follow-Up Provider:: PCP    Patient Information Continued  Income Source: SSI/SSD  Does patient have prescription coverage?: Yes  Does patient receive dialysis treatments?: No  Does patient have a history of substance abuse?: No  Does patient have a history of Mental Health Diagnosis?: Yes  Is patient receiving treatment for mental health?: Yes  Has patient received inpatient treatment related to mental health in the last 2 years?:  No         Means of Transportation  Means of Transport to Appts:: [facility]      Social Determinants of Health (SDOH)      Flowsheet Row Most Recent Value   Housing Stability    In the last 12 months, was there a time when you were not able to pay the mortgage or rent on time? N   In the past 12 months, how many times have you moved where you were living? 0   At any time in the past 12 months, were you homeless or living in a shelter (including now)? N   Transportation Needs    In the past 12 months, has lack of transportation kept you from medical appointments or from getting medications? no   In the past 12 months, has lack of transportation kept you from meetings, work, or from getting things needed for daily living? No   Food Insecurity    Within the past 12 months, you worried that your food would run out before you got the money to buy more. Never true   Within the past 12 months, the food you bought just didn't last and you didn't have money to get more. Never true   Utilities    In the past 12 months has the electric, gas, oil, or water company threatened to shut off services in your home? No            DISCHARGE DETAILS:    Discharge planning discussed with:: Patient at bedside.  Freedom of Choice: Yes  Comments - Freedom of Choice: No PT/OT consulted.  CM contacted family/caregiver?: Yes  Were Treatment Team discharge recommendations reviewed with patient/caregiver?: Yes  Did patient/caregiver verbalize understanding of patient care needs?: Yes  Were patient/caregiver advised of the risks associated with not following Treatment Team discharge recommendations?: Yes    Contacts  Patient Contacts: Brandi Caro (friend/POA)  Relationship to Patient:: Friend  Contact Method: Phone  Phone Number: [phone]  Reason/Outcome: Continuity of Care, Emergency Contact    Requested Home Health Care         Is the patient interested in HHC at discharge?: No    DME Referral Provided  Referral made for DME?:  No    Other Referral/Resources/Interventions Provided:  Referral Comments: No referrals made at this time. No rehab needs anticipated.  Programs:: COPD    Additional Comments: CM met with patient at bedside and introduced self and role. Pt reports he resides alone in a 2nd floor apartment with an elevator to access apartment. Pt has a home health aide that comes out 5 times per week for about 5-6 hours per day. Pt reports his HHA is through [facility]. Pt reports he owns and uses a walker, wheelchair, and electric scooter at home. Pt reports hx of both STR and HHC. Pt has dx of bipolar disorder and reports being compliant with medication management. Pt denied hx of substance usage. Pt uses [employer] for transportation. Pt reported he spoke with his [facility] CM, Casi, today and she informed him his MA is going to  at the end of this month. CM and pt contacted Casi via speakerphone call and clarified that pt needs to contact the [facility] assistance office at [phone] ext, [phone], to speak with someone at the assistance office to ensure his MA does not get shut off. CM informed pt of this and he requested assistance in making that phone call. CM to meet with patient later in the day to assist with contacting the MA office.

## 2024-08-19 NOTE — PHYSICAL THERAPY NOTE
PHYSICAL THERAPY EVALUATION/TREATMENT     08/19/24 1310   PT Last Visit   PT Visit Date 08/19/24   Note Type   Note type Evaluation   Pain Assessment   Pain Assessment Tool Mon-Baker FACES   Mon-Baker FACES Pain Rating 6  (Low back and neck areas)   Restrictions/Precautions   Other Precautions Chair Alarm;Bed Alarm;Fall Risk;Pain   Home Living   Type of Home Apartment   Home Layout One level;Elevator   Bathroom Equipment Shower chair   Home Equipment Walker;Quad cane;Electric scooter  (2 quad canes, home O2)   Additional Comments Patient reports recently using a electric scooter most of the time in his apartment although typically using a quad cane prior to that timeframe   Prior Function   Level of [employer] Independent with ADLs;Independent with functional mobility;Needs assistance with IADLS   Lives With Alone   Receives Help From Home health  (Home health aide 5 days a week for 5 to 6 hours at a time)   IADLs Family/Friend/Other provides transportation   Comments Patient states typically completing his own ADLs although at this time feels he will require increased assist as well as with new onset of neck pain   General   Additional Pertinent History Chart reviewed, patient admitted with generalized weakness and hallucinations.  Patient now presents as generally weak, deconditioned requiring a roller walker for safety with transfers and gait.   Cognition   Overall Cognitive Status WFL   Arousal/Participation Cooperative   Attention Within functional limits   Orientation Level Oriented X4   Following Commands Follows all commands and directions without difficulty   Subjective   Subjective Patient states chronic low back pain although new onset of neck pain in the last week and 1/2 to 2 weeks   RLE Assessment   RLE Assessment   (Range of motion within functional limits, strength 3+/4 -)   LLE Assessment   LLE Assessment   (Range of motion within functional limits, strength 3+/4 -)   Coordination    Movements are Fluid and Coordinated 0   Coordination and Movement Description Decreased coordination with transfer and gait activity, improved with use of roller walker   Bed Mobility   Supine to Sit 5  Supervision   Transfers   Sit to Stand 5  Supervision   Stand to Sit 5  Supervision   Ambulation/Elevation   Gait Assistance   (min to mod)   Additional items Assist x 1;Verbal cues;Tactile cues   Assistive Device   (None/handhold)   Distance 5 feet with unsteady gait patterning guarded due to low back and neck pain as well   Balance   Static Sitting Fair +   Dynamic Sitting Fair   Static Standing Fair   Dynamic Standing Fair -   Ambulatory Fair -   Activity Tolerance   Activity Tolerance Patient limited by fatigue;Patient limited by pain   Nurse Made Aware Yes   Assessment   Prognosis Good   Problem List Decreased strength;Decreased range of motion;Decreased endurance;Impaired balance;Decreased mobility;Decreased coordination;Pain   Assessment Patient seen for Physical Therapy evaluation. Patient admitted with <principal problem not specified>.  Comorbidities affecting patient's physical performance include: .  Personal factors affecting patient at time of initial evaluation include: ambulating with assistive device, inability to ambulate household distances, inability to navigate community distances, inability to navigate level surfaces without external assistance, inability to perform dynamic tasks in community, limited home support, inability to perform physical activity, inability to perform ADLS, and inability to perform IADLS . Prior to admission, patient was requiring assist for functional mobility with walker/cane, independent with ADLS, requiring assist for IADLS, ambulating household distance, and home with family assist.  Please find objective findings from Physical Therapy assessment regarding body systems outlined above with impairments and limitations including weakness, decreased ROM, impaired  balance, decreased endurance, impaired coordination, gait deviations, pain, decreased activity tolerance, decreased functional mobility tolerance, fall risk, and SOB upon exertion.  The Barthel Index was used as a functional outcome tool presenting with a score of Barthel Index Score: 45 today indicating marked limitations of functional mobility and ADLS.  Patient's clinical presentation is currently unstable/unpredictable as seen in patient's presentation of vital sign response, changing level of pain, increased fall risk, new onset of impairment of functional mobility, decreased endurance, and new onset of weakness. Pt would benefit from continued Physical Therapy treatment to address deficits as defined above and maximize level of functional mobility. As demonstrated by objective findings, the assigned level of complexity for this evaluation is high.The patient's -[facility] Basic Mobility Inpatient Short Form Raw Score is 18. A Raw score of greater than 16 suggests the patient may benefit from discharge to home. Please also refer to the recommendation of the Physical Therapist for safe discharge planning.   Goals   Patient Goals To go home and get back to walking   STG Expiration Date 08/26/24   Short Term Goal #1 Transfers and gait with roller walker independently   Short Term Goal #2 Number gait endurance to 60 feet, strength bilateral lower extremities 4 -/4   LTG Expiration Date 09/02/24   Long Term Goal #1 Independent transfers and gait with quad cane as prior to declining   Long Term Goal #2 Gait endurance to functional household distances   Plan   Treatment/Interventions ADL retraining;Functional transfer training;LE strengthening/ROM;Therapeutic exercise;Endurance training;Patient/family training;Equipment eval/education;Bed mobility;Gait training;Compensatory technique education   PT Frequency Other (Comment)  (5 times per week)   Discharge Recommendation   Rehab Resource Intensity Level, PT III (Minimum  Resource Intensity)   AM-PAC Basic Mobility Inpatient   Turning in Flat Bed Without Bedrails 4   Lying on Back to Sitting on Edge of Flat Bed Without Bedrails 4   Moving Bed to Chair 3   Standing Up From Chair Using Arms 3   Walk in Room 3   Climb 3-5 Stairs With Railing 1   Basic Mobility Inpatient Raw Score 18   Basic Mobility Standardized Score 41.05   [facility] Highest Level Of Mobility   -HL Goal 6: Walk 10 steps or more   JH-HLM Achieved 6: Walk 10 steps or more   Barthel Index   Feeding 10   Bathing 0   Grooming Score 5   Dressing Score 5   Bladder Score 0   Bowels Score 10   Toilet Use Score 5   Transfers (Bed/Chair) Score 10   Mobility (Level Surface) Score 0   Stairs Score 0   Barthel Index Score 45   Additional Treatment Session   Start Time 1255   End Time 1310   Treatment Assessment S: Patient with chronic low back pain and new onset of neck pain O: Bilateral lower extremity exercise completed as listed below.  Education completed and posture and body mechanics for pain improvement in cervical spinal area with mobility A: Patient will benefit from continued physical therapy with progression as tolerated and increasing functional mobility with clinical staff as well   Exercises   Heelslides Supine;5 reps;Bilateral   Hip Flexion Sitting;Bilateral;5 reps   Knee AROM Long Arc Quad Sitting;5 reps;Bilateral   Marching Standing;10 reps;Bilateral   Balance training  Sidestepping and backward stepping completed for balance and coordination   Licensure   [place] License Number  Astrid Odonnell PT 4 0 QA [id number]
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
## 2024-08-19 NOTE — PROGRESS NOTES
Daily Progress Note - [facility]  Family Medicine Residency  Alonzo Watson 63 y.o. male MRN: [id number]  Unit/Bed#: [address] [place] [address] Encounter: [id number]  Admitting Physician: Jeanna Alexander MD   PCP: MANAS Payne  Date of Admission:  8/18/2024  5:41 AM    Assessment and Plan    Leukocytosis  Assessment & Plan  Patient with CLL and history of leukocytosis.  Follows up with hematology-oncology outpatient. Last visit was in April where he agreed to begin IVIG, insurance does not cover.    POA WBC 33.54, LA 3.6.    Trend WBC   Continue IV  ml/hr   Hematology/oncology consulted    Visual hallucination  Assessment & Plan  New visual hallucinations onset, history of CLL, not currently receiving treatment. Increased neck pain and tenderness.      -LA 3.6  -WBC 33.54  -VBG negative     Head CT  Trend WBC and LA  Monitor for any mental status changes       CLL (chronic lymphocytic leukemia) (HCC)  Assessment & Plan  History of CLL, follows with hematology oncology.  Patient was open to beginning IVIG treatment in April, but in insurance does not cover.    Chronic low back pain  Assessment & Plan  Worsening diffuse tenderness along upper and lower back. Limited range of motion secondary to increased pain upon movement.     Spinal CT (cervical, thoracic, lumbar)  Heating/cooling packs  Pain Management: oxyCODONE-acetaminophen (PERCOCET) 5-325 mg per tablet 2 tablet for moderate pain, Dilaudid 0.5 mg for breakthrough pain     Urinary retention  Assessment & Plan  Noted to be retaining urine on 8/18 on bladder scan.  Patient requesting home Bumex which he states will help.    Urinary retention protocol  Begin Flomax 0.8 mg daily  Continue to monitor urine output    Muscle spasms of both lower extremities  Assessment & Plan  Chronic    Continue home ZANAFLEX as needed      Chronic obstructive pulmonary disease, unspecified COPD type ([facility])  Assessment & Plan  Chronic    Follows up with pulmonology.   Home medications include Trelegy daily, DuoNebs and Ventolin as needed.     Continue home medications    Chronic kidney disease, stage 2 (mild)  Assessment & Plan  Lab Results   Component Value Date    EGFR 62 08/18/2024    EGFR 100 05/20/2024    EGFR 96 04/15/2024    CREATININE 1.22 08/18/2024    CREATININE 0.69 05/20/2024    CREATININE 0.75 04/15/2024     Chronic, stable.     Continue to monitor  Avoid nephrotoxic agents    Heart failure (HCC)  Assessment & Plan  Wt Readings from Last 3 Encounters:   04/08/24 103 kg (228 lb)   02/27/24 108 kg (238 lb 1.6 oz)   02/06/24 108 kg (238 lb)     Chronic; stable.    Home medications includes spironolactone 25 mg daily, metoprolol 200 mg daily and Bumex 1 mg as needed.  Last echo of 2022 showed EF 55%.    Continue spironolactone 25 mg daily  Repeat Echo (last 2022)    Chronic a-fib ([facility])  Assessment & Plan  Chronic, stable. At home on Digoxin 0.25mcg PO daily & Metoprolol 200mg PO QD & Eliquis 5 mg BID.    -EKG: a-fib, no changes     Continue home medications    Obstructive sleep apnea  Assessment & Plan  Chronic; uses BiPAP at bedtime.    Type 2 diabetes mellitus with complication, with long-term current use of insulin ([facility])  Assessment & Plan  Lab Results   Component Value Date    HGBA1C 9.7 (H) 04/02/2024     Chronic, uncontrolled.     Home medication includes gabapentin 800 mg 4 times daily, glargine 60 units twice daily and lispro 20 units 3 times daily with meals.     ED: Glucose was 302 and received 10 units regular insulin    Lantus 45 U BID  Lispro 10 U 3 times daily with meals  ISS  ACHS  Continue home gabapentin 800 mg 4 times daily    Chronic respiratory failure (HCC)  Assessment & Plan  Hx of COPD and chronic hypoxic respiratory failure, on 3 L of oxygen during the day and BiPAP with 3 L of oxygen at night.  Currently on baseline oxygen requirements.    Continue to monitor SpO2  Continue supplemental oxygen  Continue BiPAP nightly  Respiratory  protocol    Diabetic neuropathy (HCC)  Assessment & Plan  Continue home gabapentin 800 mg 4 times daily.     Essential hypertension  Assessment & Plan  Chronic; stable    Home medication include Cozaar 50 mg daily and metoprolol 200 mg daily.     Continue home medication  Continue to monitor blood pressure    Coronary artery disease  Assessment & Plan  Chronic, stable.    Home medication includes metoprolol 200 daily. Lipitor and ASA?    Continue home medications    Psychiatric disorder  Assessment & Plan  History of bipolar dx, depression with anxiety.  Home regimen: BUSPAR 10mg BID, Zoloft 50mg daily, Seroquel 400 mg daily at bedtime.     Continue home medications         VTE Pharmacologic Prophylaxis: VTE Score: 13 High Risk (Score >/= 5) - Pharmacological DVT Prophylaxis Ordered: apixaban (Eliquis). Sequential Compression Devices Ordered.    Patient Centered Rounds: I have performed bedside rounds with nursing staff today.    Discussions with Specialists or Other Care Team Provider: N/A    Education and Discussions with Family / Patient: Yes  Patient Information Sharing: With the consent of Alonzo Watson , their loved ones were notified today by inpatient team of the patient’s condition and current plan.     Time Spent for Care: 20 minutes.  More than 50% of total time spent on counseling and coordination of care as described above.    Current Length of Stay: 1 day(s)    Current Patient Status: Inpatient   Certification Statement: The patient will continue to require additional inpatient hospital stay due to leukocytosis     Discharge Plan: Pending    Code Status: Level 3 - DNAR and DNI    Subjective:   Patient seen and examined at bedside. No fevers/chills, N/V/D, no abdominal pain or chest pain present. Pain is well controlled with medication in his back and neck. No difficulty urinating. Voided 825 overnight. Last bowel movement yesterday. Eating appropriately.      Objective     Objective:   Vitals:   Temp  (24hrs), Av °F (36.7 °C), Min:97.7 °F (36.5 °C), Max:98.4 °F (36.9 °C)    Temp:  [97.7 °F (36.5 °C)-98.4 °F (36.9 °C)] 97.9 °F (36.6 °C)  HR:  [74-78] 74  Resp:  [18-19] 18  BP: (110-138)/(68-95) 110/68  SpO2:  [95 %-98 %] 98 %  Body mass index is 31.13 kg/m².     Input and Output Summary (last 24 hours):       Intake/Output Summary (Last 24 hours) at 2024 0749  Last data filed at 2024 0633  Gross per 24 hour   Intake 2140 ml   Output 5050 ml   Net -2910 ml       Physical Exam:   Physical Exam  Constitutional:       General: He is not in acute distress.     Appearance: Normal appearance.   HENT:      Head: Normocephalic and atraumatic.      Nose: Nose normal. No congestion or rhinorrhea.      Mouth/Throat:      Mouth: Mucous membranes are moist.   Eyes:      Conjunctiva/sclera: Conjunctivae normal.   Cardiovascular:      Rate and Rhythm: Rhythm irregular.      Heart sounds: No murmur heard.  Pulmonary:      Effort: Pulmonary effort is normal.      Breath sounds: Rhonchi present.   Abdominal:      General: Abdomen is flat. There is no distension.      Palpations: Abdomen is soft.      Tenderness: There is no abdominal tenderness.   Musculoskeletal:         General: Tenderness (Left leg tenderness) present. No swelling.   Skin:     General: Skin is warm and dry.   Neurological:      General: No focal deficit present.      Mental Status: He is alert and oriented to person, place, and time.      Cranial Nerves: No cranial nerve deficit.      Sensory: No sensory deficit.      Motor: No weakness.   Psychiatric:         Mood and Affect: Mood normal.         Behavior: Behavior normal.         Thought Content: Thought content normal.         Additional Data:     Labs:  Results from last 7 days   Lab Units 24  0503   WBC Thousand/uL 28.03*   HEMOGLOBIN g/dL 14.8   HEMATOCRIT % 44.0   PLATELETS Thousands/uL 166   SEGS PCT % 14*   LYMPHO PCT % 84*   MONO PCT % 2*   EOS PCT % 0     Results from last 7 days    Lab Units 08/19/24  0503 08/18/24  0548   POTASSIUM mmol/L 4.0 5.1   CHLORIDE mmol/L 100 92*   CO2 mmol/L 28 26   BUN mg/dL 14 22   CREATININE mg/dL 0.72 1.22   CALCIUM mg/dL 9.3 9.8   ALK PHOS U/L  --  69   ALT U/L  --  20   AST U/L  --  18         Results from last 7 days   Lab Units 08/18/24  1646 08/18/24  1147 08/18/24  0949   POC GLUCOSE mg/dl 309* 188* 159*           * I Have Reviewed All Lab Data Listed Above.  * Additional Pertinent Lab Tests Reviewed: All Labs Within Last 24 Hours Reviewed    Imaging:    Imaging Reports Reviewed Today Include:   CT spine cervical: distended bladder  CT spine thoracic/lumbar: Mildly prominent subcentimeter cervical lymph nodes, likely due to underlying chronic lymphocytic leukemia   CT brain: No acute intracranial abnormality    Recent Cultures (last 7 days):     Results from last 7 days   Lab Units 08/18/24  0612   BLOOD CULTURE  Received in Microbiology Lab. Culture in Progress.  Received in Microbiology Lab. Culture in Progress.       Last 24 Hours Medication List:   Current Facility-Administered Medications   Medication Dose Route Frequency Provider Last Rate    apixaban  5 mg Oral BID Napoleon Rey MD      busPIRone  10 mg Oral BID Napoleon Rey MD      digoxin  250 mcg Oral Daily Napoleon Rey MD      docusate sodium  100 mg Oral BID Napoleon Rey MD      Fluticasone Furoate-Vilanterol  1 puff Inhalation Daily Napoleon Rey MD      And    umeclidinium  1 puff Inhalation Daily Napoleon Rey MD      gabapentin  800 mg Oral 4x Daily Napoleon Rey MD      HYDROmorphone  0.5 mg Intravenous Q4H PRN Napoleon Rey MD      insulin glargine  45 Units Subcutaneous Q12H LELAND Napoleon Rey MD      insulin lispro  10 Units Subcutaneous TID With Meals Napoleon Rey MD      ipratropium-albuterol  3 mL Nebulization Q6H PRN Napoleon Rey MD      lamoTRIgine  25 mg Oral HS Napoleon Rey MD      lidocaine  2 patch Topical Daily Jami Jade MD      losartan  50 mg Oral Daily Napoleon Rey MD      metoprolol  succinate  200 mg Oral Daily Napoleon Rey MD      multivitamin-minerals  1 tablet Oral Daily Napoleon Rey MD      oxyCODONE-acetaminophen  2 tablet Oral Q8H PRN Jami Jade MD      QUEtiapine  400 mg Oral HS Jami Jade MD      sertraline  50 mg Oral Daily Napoleon Rey MD      sodium chloride  1 g Oral QAM Napoleon Rey MD      spironolactone  25 mg Oral Daily Napoleon Rey MD      tamsulosin  0.8 mg Oral Daily With Dinner Davina June, DO      tiZANidine  4 mg Oral Q8H PRN Napoleon Rey MD               ** Please Note: Dictation voice to text software may have been used in the creation of this document. **    Napoleon Rey MD  08/19/24  7:49 AM

## 2024-08-19 NOTE — OCCUPATIONAL THERAPY NOTE
"OT EVALUATION       08/19/24 1335   OT Last Visit   OT Visit Date 08/19/24   Note Type   Note type Evaluation   Pain Assessment   Pain Assessment Tool Mon-Baker FACES   Mon-Baker FACES Pain Rating 6   Pain Location/Orientation Location: Back   Restrictions/Precautions   Other Precautions Bed Alarm;Chair Alarm;Fall Risk   Home Living   Type of Home Apartment   Home Layout One level;Elevator   Bathroom Shower/Tub Walk-in shower   Bathroom Toilet Raised   Bathroom Equipment Shower chair;Grab bars in shower;Toilet raiser;Grab bars around toilet   Home Equipment Walker;Quad cane;Electric scooter  (2 quad canes, home O2)   Additional Comments pt reports using the electric scooter mostly at home for mobility   Prior Function   Level of [place] Independent with ADLs;Independent with functional mobility;Needs assistance with IADLS   Lives With Alone   Receives Help From Home health   IADLs Family/Friend/Other provides transportation   Comments pt has a HHA 5-6 hours per day 5 days a week   General   Additional General Comments Patient admitted with generalized weakness and visual hallucinations of past loved one   Subjective   Subjective \"I want to go home\"   ADL   Eating Assistance 7  Independent   Grooming Assistance 7  Independent   UB Bathing Assistance 7  Independent   LB Bathing Assistance 4  Minimal Assistance   UB Dressing Assistance 7  Independent   LB Dressing Assistance 4  Minimal Assistance   Toileting Assistance  5  Supervision/Setup   Bed Mobility   Supine to Sit 5  Supervision   Transfers   Sit to Stand 5  Supervision   Stand to Sit 5  Supervision   Functional Mobility   Functional Mobility 5  Supervision   Additional Comments short household distance with RW   Balance   Static Sitting Fair +   Dynamic Sitting Fair   Static Standing Fair   Dynamic Standing Fair   Activity Tolerance   Activity Tolerance Patient limited by fatigue;Patient limited by pain   Nurse Made Aware yes, Shandra JASON Assessment "   RUE Assessment WFL   LUE Assessment   LUE Assessment WFL   Cognition   Overall Cognitive Status WFL   Arousal/Participation Cooperative   Attention Within functional limits   Orientation Level Oriented X4   Following Commands Follows all commands and directions without difficulty   Assessment   Limitation Decreased ADL status;Decreased UE strength;Decreased Safe judgement during ADL;Decreased endurance;Decreased self-care trans;Decreased high-level ADLs  (decreased balance and mobility)   Prognosis Good   Assessment Patient evaluated by Occupational Therapy.  Patient admitted with <principal problem not specified>.  The patients occupational profile, medical and therapy history includes a extensive additional review of physical, cognitive, or psychosocial history related to current functional performance.  Comorbidities affecting functional mobility and ADLS include: anxiety, Bipolar disorder, CAD, CHF, COPD, and diabetes and CLL.  Prior to admission, patient was independent with functional mobility with 2 quad canes or electric scooter, independent with ADLS, and requiring assist for IADLS.  The evaluation identifies the following performance deficits: weakness, impaired balance, decreased endurance, increased fall risk, new onset of impairment of functional mobility, decreased ADLS, decreased IADLS, pain, decreased activity tolerance, decreased safety awareness, impaired judgement, and decreased strength, that result in activity limitations and/or participation restrictions. This evaluation requires clinical decision making of high complexity, because the patient presents with comorbidites that affect occupational performance and required significant modification of tasks or assistance with consideration of multiple treatment options.  The Barthel Index was used as a functional outcome tool presenting with a score of Barthel Index Score: 45, indicating marked limitations of functional mobility and ADLS.  The  patient's raw score on the AM-PAC Daily Activity Inpatient Short Form is 21. A raw score of greater than or equal to 19 suggests the patient may benefit from discharge to home. Please refer to the recommendation of the Occupational Therapist for safe discharge planning.  Patient will benefit from skilled Occupational Therapy services to address above deficits and facilitate a safe return to prior level of function.   Goals   Patient Goals to go home   STG Time Frame   (1-7 days)   Short Term Goal  Goals established to promote Patient Goals: to go home:  Grooming: independent standing at sink; Bathing: supervision; Upper Body Dressing independent; Lower Body Dressing: supervision; Toileting: independent; Patient will increase ambulatory standard toilet transfer to independent with rolling walker to increase performance and safety with ADLS and functional mobility; Patient will increase standing tolerance to 3 minutes during ADL task to decrease assistance level and decrease fall risk; Patient will increase bed mobility to independent in preparation for ADLS and transfers; Patient will increase functional mobility to and from bathroom with rolling walker independently to increase performance with ADLS and to use a toilet; Patient will tolerate 5 minutes of UE ROM/strengthening to increase general activity tolerance and performance in ADLS/IADLS; Patient will improve functional activity tolerance to 10 minutes of sustained functional tasks to increase participation in basic self-care and decrease assistance level;  Patient will be able to to verbalize understanding and perform energy conservation/proper body mechanics during ADLS and functional mobility at least 75% of the time with minimal cueing to decrease signs of fatigue and increase stamina to return to prior level of function;  Patient will increase dynamic standing balance to fair+ to improve postural stability and decrease fall risk during standing ADLS and  transfers.   LTG Time Frame   (8-14 days)   Long Term Goal Bathing: independent; Lower Body Dressing: independent;  Patient will increase standing tolerance to 6 minutes during ADL task to decrease assistance level and decrease fall risk;  Patient will tolerate 10 minutes of UE ROM/strengthening to increase general activity tolerance and performance in ADLS/IADLS; Patient will improve functional activity tolerance to 20 minutes of sustained functional tasks to increase participation in basic self-care and decrease assistance level;  Patient will be able to to verbalize understanding and perform energy conservation/proper body mechanics during ADLS and functional mobility at least 90% of the time  to decrease signs of fatigue and increase stamina to return to prior level of function;  Patient will increase dynamic standing balance to good to improve postural stability and decrease fall risk during standing ADLS and transfers.   Pt will score >/= 24/24 on AM-PAC Daily Activity Inpatient scale to promote safe independence with ADLs and functional mobility; Pt will score >/= 75/100 on Barthel Index in order to decrease caregiver assistance needed and increase ability to perform ADLs and functional mobility.   Plan   Treatment Interventions ADL retraining;Functional transfer training;UE strengthening/ROM;Endurance training;Patient/family training;Equipment evaluation/education;Activityengagement;Compensatory technique education   Goal Expiration Date 09/02/24   OT Frequency 3-5x/wk   Discharge Recommendation   Rehab Resource Intensity Level, OT III (Minimum Resource Intensity)   AM-PAC Daily Activity Inpatient   Lower Body Dressing 3   Bathing 3   Toileting 3   Upper Body Dressing 4   Grooming 4   Eating 4   Daily Activity Raw Score 21   Daily Activity Standardized Score (Calc for Raw Score >=11) 44.27   AM-PAC Applied Cognition Inpatient   Following a Speech/Presentation 4   Understanding Ordinary Conversation 4    Taking Medications 4   Remembering Where Things Are Placed or Put Away 4   Remembering List of 4-5 Errands 4   Taking Care of Complicated Tasks 4   Applied Cognition Raw Score 24   Applied Cognition Standardized Score 62.21   Barthel Index   Feeding 10   Bathing 0   Grooming Score 5   Dressing Score 5   Bladder Score 0   Bowels Score 10   Toilet Use Score 5   Transfers (Bed/Chair) Score 10   Mobility (Level Surface) Score 0   Stairs Score 0   Barthel Index Score 45   Licensure   [place] License Number  Judy Mcfarland MS OTR/L [id number]

## 2024-08-19 NOTE — CASE MANAGEMENT
Case Management Discharge Planning Note    Patient name Alonzo Watson  Location [address] [address]/[address] [address]-* MRN [id number]  : 1960 Date 2024       Current Admission Date: 2024  Current Admission Diagnosis:Muscle spasms of both lower extremities   Patient Active Problem List    Diagnosis Date Noted Date Diagnosed    Leukocytosis 2024     Urinary retention 2024     Sciatica of left side 2024     Muscle spasms of both lower extremities 2024     Food insecurity 2024     Chronic intractable pain 04/15/2024     Gastroparesis 2024     Irritable bowel syndrome with diarrhea 2024     Hx of adenomatous colonic polyps 2024     Chronic obstructive pulmonary disease, unspecified COPD type ([facility]) 2023     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     Unspecified lack of coordination 10/25/2023     Other symbolic dysfunctions 10/25/2023     Other abnormalities of gait and mobility 10/25/2023     Severe pain 2023     Morbid (severe) obesity with alveolar hypoventilation ([facility]) 2023     Acute sensory neuropathy 2023     Allergies 2023     Hepatosplenomegaly 2023     Ambulatory dysfunction 2023     Immunodeficiency, unspecified ([facility]) 2022     CLL (chronic lymphocytic leukemia) ([facility]) 2022     Hypo-osmolality and hyponatremia 2022     Chronic lymphocytic leukemia of B-cell type in remission ([facility]) 2022     Chronic obstructive pulmonary disease, unspecified ([facility]) 2022     Chronic pain syndrome 2022     Foraminal stenosis of lumbar region 2022     Lumbar post-laminectomy syndrome 2022     Lumbar radiculopathy 2022     Dysuria 2021     Other intervertebral disc degeneration, lumbar region 2021     Bipolar disorder ([facility]) 2021     Paroxysmal atrial fibrillation ([facility]) 2021     Chronic respiratory failure with hypoxia ([facility]) 2021     Chronic kidney disease, stage 2  (mild) 11/28/2021     Atherosclerotic heart disease of native coronary artery without angina pectoris 11/28/2021     Peripheral neuropathy 11/26/2021     Muscle weakness (generalized) 09/21/2021     Platelets decreased (HCC) 08/06/2021     Heart failure ([facility]) 11/17/2020     Nutritional anemia, unspecified  10/29/2020     Chest pain 10/11/2020     Transition of care performed with sharing of clinical summary 04/11/2020     Obstructive sleep apnea 02/02/2020     Chronic a-fib ([facility]) 02/02/2020     Type 2 diabetes mellitus with complication, with long-term current use of insulin ([facility]) 06/21/2019     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 ([facility]) 03/25/2019     Lung disease, restrictive 11/21/2018     Chronic respiratory failure ([facility]) 10/31/2018     Coronary artery disease 09/06/2017     Esophageal reflux 09/06/2017     Chronic low back pain 11/30/2016     SHAVONNE and COPD overlap syndrome       Morbid obesity       Fatigue 09/02/2016     Hyponatremia 09/02/2016     Psychiatric disorder      Erectile dysfunction of organic origin 08/25/2014     Essential hypertension 09/04/2012     Diabetic neuropathy (HCC) 09/04/2012     Panic disorder without agoraphobia 09/04/2012       LOS (days): 1  Geometric Mean LOS (GMLOS) (days):   Days to GMLOS:     OBJECTIVE:  Risk of Unplanned Readmission Score: 30.42         Current admission status: Inpatient   Preferred Pharmacy:   [address] [address]  Phone: [phone] Fax: [phone]    Primary Care Provider: MANAS Payne    Primary Insurance: [employer]  Secondary Insurance:     DISCHARGE DETAILS:    Requested Home Health Care         Is the patient interested in HHC at discharge?: Yes  Home Health Discipline requested:: Occupational Therapy, Physical Therapy, Nursing  Home Health Services Needed:: Evaluate Functional Status and Safety,  Gait/ADL Training, Strengthening/Theraputic Exercises to Improve Function  Homebound Criteria Met:: Requires the Assistance of Another Person for Safe Ambulation or to Leave the Home, Uses an Assist Device (i.e. cane, walker, etc)  Supporting Clincal Findings:: Limited Endurance, Fatigues Easliy in Short Distances    Other Referral/Resources/Interventions Provided:  Interventions: [facility]  Referral Comments: [place] [facility] referral sent at this time.     ADDENDUM 2:20pm: CM met with patient at bedside and assisted pt in contacting the [facility] assistance office. Pt got vm box and left message. Pt reports he knows how to call them and will contact them tomorrow again. Pt reported he feels he knows what documents they need to continue his horizon coverage and just forgot to send it in. CM also reviewed therapy rec of [facility] at this time and pt is agreeable. Pt reported he would like [facility] if they are available. CM awaiting on response from [facility].
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
## 2024-08-19 NOTE — PLAN OF CARE
Problem: Potential for Falls  Goal: Patient will remain free of falls  Description: INTERVENTIONS:  - Educate patient/family on patient safety including physical limitations  - Instruct patient to call for assistance with activity   - Consult OT/PT to assist with strengthening/mobility   - Keep Call bell within reach  - Keep bed low and locked with side rails adjusted as appropriate  - Keep care items and personal belongings within reach  - Initiate and maintain comfort rounds  - Make Fall Risk Sign visible to staff  - Offer Toileting every 2 Hours, in advance of need  - Initiate/Maintain bed alarm  - Obtain necessary fall risk management equipment: bed alarm  - Apply yellow socks and bracelet for high fall risk patients  - Consider moving patient to room near nurses station  Outcome: Progressing     Problem: PAIN - ADULT  Goal: Verbalizes/displays adequate comfort level or baseline comfort level  Description: Interventions:  - Encourage patient to monitor pain and request assistance  - Assess pain using appropriate pain scale  - Administer analgesics based on type and severity of pain and evaluate response  - Implement non-pharmacological measures as appropriate and evaluate response  - Consider cultural and social influences on pain and pain management  - Notify physician/advanced practitioner if interventions unsuccessful or patient reports new pain  Outcome: Progressing     Problem: INFECTION - ADULT  Goal: Absence or prevention of progression during hospitalization  Description: INTERVENTIONS:  - Assess and monitor for signs and symptoms of infection  - Monitor lab/diagnostic results  - Monitor all insertion sites, i.e. indwelling lines, tubes, and drains  - Administer medications as ordered  - Instruct and encourage patient and family to use good hand hygiene technique  - Identify and instruct in appropriate isolation precautions for identified infection/condition  Outcome: Progressing  Goal: Absence of  fever/infection during neutropenic period  Description: INTERVENTIONS:  - Monitor WBC    Outcome: Progressing     Problem: SAFETY ADULT  Goal: Patient will remain free of falls  Description: INTERVENTIONS:  - Educate patient/family on patient safety including physical limitations  - Instruct patient to call for assistance with activity   - Consult OT/PT to assist with strengthening/mobility   - Keep Call bell within reach  - Keep bed low and locked with side rails adjusted as appropriate  - Keep care items and personal belongings within reach  - Initiate and maintain comfort rounds  - Make Fall Risk Sign visible to staff  - Offer Toileting every 2 Hours, in advance of need  - Initiate/Maintain bed alarm  - Obtain necessary fall risk management equipment: bed alarm  - Apply yellow socks and bracelet for high fall risk patients  - Consider moving patient to room near nurses station  Outcome: Progressing  Goal: Maintain or return to baseline ADL function  Description: INTERVENTIONS:  - Educate patient/family on patient safety including physical limitations  - Instruct patient to call for assistance with activity   - Consult OT/PT to assist with strengthening/mobility   - Keep Call bell within reach  - Keep bed low and locked with side rails adjusted as appropriate  - Keep care items and personal belongings within reach  - Initiate and maintain comfort rounds  - Make Fall Risk Sign visible to staff  - Offer Toileting every 2 Hours, in advance of need  - Initiate/Maintain bed alarm  - Obtain necessary fall risk management equipment: bed alarm  - Apply yellow socks and bracelet for high fall risk patients  - Consider moving patient to room near nurses station  Outcome: Progressing  Goal: Maintains/Returns to pre admission functional level  Description: INTERVENTIONS:  - Perform AM-PAC 6 Click Basic Mobility/ Daily Activity assessment daily.  - Set and communicate daily mobility goal to care team and  patient/family/caregiver.   - Collaborate with rehabilitation services on mobility goals if consulted  - Perform Range of Motion 3 times a day.  - Reposition patient every 2 hours.  - Dangle patient 3 times a day  - Stand patient 3 times a day  - Ambulate patient 3 times a day  - Out of bed to chair 3 times a day   - Out of bed for meals 3 times a day  - Out of bed for toileting  - Record patient progress and toleration of activity level   Outcome: Progressing     Problem: DISCHARGE PLANNING  Goal: Discharge to home or other facility with appropriate resources  Description: INTERVENTIONS:  - Identify barriers to discharge w/patient and caregiver  - Arrange for needed discharge resources and transportation as appropriate  - Identify discharge learning needs (meds, wound care, etc.)  - Refer to Case Management Department for coordinating discharge planning if the patient needs post-hospital services based on physician/advanced practitioner order or complex needs related to functional status, cognitive ability, or social support system  Outcome: Progressing     Problem: Knowledge Deficit  Goal: Patient/family/caregiver demonstrates understanding of disease process, treatment plan, medications, and discharge instructions  Description: Complete learning assessment and assess knowledge base.  Interventions:  - Provide teaching at level of understanding  - Provide teaching via preferred learning methods  Outcome: Progressing     Problem: NEUROSENSORY - ADULT  Goal: Achieves stable or improved neurological status  Description: INTERVENTIONS  - Monitor and report changes in neurological status  - Monitor vital signs such as temperature, blood pressure, glucose, and any other labs ordered   - Initiate measures to prevent increased intracranial pressure  - Monitor for seizure activity and implement precautions if appropriate      Outcome: Progressing  Goal: Remains free of injury related to seizures activity  Description:  INTERVENTIONS  - Maintain airway, patient safety  and administer oxygen as ordered  - Monitor patient for seizure activity, document and report duration and description of seizure to physician/advanced practitioner  - If seizure occurs,  ensure patient safety during seizure  - Reorient patient post seizure  - Seizure pads on all 4 side rails  - Instruct patient/family to notify RN of any seizure activity including if an aura is experienced  - Instruct patient/family to call for assistance with activity based on nursing assessment  - Administer anti-seizure medications if ordered    Outcome: Progressing  Goal: Achieves maximal functionality and self care  Description: INTERVENTIONS  - Monitor swallowing and airway patency with patient fatigue and changes in neurological status  - Encourage and assist patient to increase activity and self care.   - Encourage visually impaired, hearing impaired and aphasic patients to use assistive/communication devices  Outcome: Progressing     Problem: RESPIRATORY - ADULT  Goal: Achieves optimal ventilation and oxygenation  Description: INTERVENTIONS:  - Assess for changes in respiratory status  - Assess for changes in mentation and behavior  - Position to facilitate oxygenation and minimize respiratory effort  - Oxygen administered by appropriate delivery if ordered  - Initiate smoking cessation education as indicated  - Encourage broncho-pulmonary hygiene including cough, deep breathe, Incentive Spirometry  - Assess the need for suctioning and aspirate as needed  - Assess and instruct to report SOB or any respiratory difficulty  - Respiratory Therapy support as indicated  Outcome: Progressing

## 2024-08-19 NOTE — UTILIZATION REVIEW
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 08/18/24 0855 End: 08/18/24 0926   Admin Instructions:               0926-D/C'd       tiZANidine (ZANAFLEX) tablet 4 mg  Dose: 4 mg  Freq: Every 8 hours PRN Route: PO  PRN Reason: muscle spasms  Start: 08/18/24 1058   Admin Instructions:               2015 [C]      1153        Legend:               Network Utilization Review Department  ATTENTION: Please call with any questions or concerns to [phone] and carefully listen to the prompts so that you are directed to the right person. All voicemails are confidential.   For Discharge needs, contact Care Management DC Support Team at [phone] opt. 2  Send all requests for admission clinical reviews, approved or denied determinations and any other requests to dedicated fax number below belonging to the campus where the patient is receiving treatment. List of dedicated fax numbers for the Facilities:  FACILITY NAME UR FAX NUMBER   ADMISSION DENIALS (Administrative/Medical Necessity) [phone]   DISCHARGE SUPPORT TEAM (NETWORK) [phone]   PARENT CHILD HEALTH (Maternity/NICU/Pediatrics) [phone]   [facility] [phone]   [facility] [phone]   [facility] [phone]   [facility] [phone]   [facility] [phone]   [facility] [phone]    [facility] [phone]   [facility] [phone]   [facility] [phone]   [facility] [phone]   [facility] [phone]   [facility] [phone]

## 2024-08-20 VITALS
HEIGHT: 71 IN | OXYGEN SATURATION: 99 % | WEIGHT: 234.8 LBS | HEART RATE: 85 BPM | TEMPERATURE: 98 F | DIASTOLIC BLOOD PRESSURE: 76 MMHG | RESPIRATION RATE: 20 BRPM | SYSTOLIC BLOOD PRESSURE: 134 MMHG | BODY MASS INDEX: 32.87 KG/M2

## 2024-08-20 LAB
ANION GAP SERPL CALCULATED.3IONS-SCNC: 7 MMOL/L (ref 4–13)
BACTERIA UR CULT: NORMAL
BASOPHILS # BLD AUTO: 0.05 THOUSANDS/ÂΜL (ref 0–0.1)
BASOPHILS NFR BLD AUTO: 0 % (ref 0–1)
BUN SERPL-MCNC: 10 MG/DL (ref 5–25)
CALCIUM SERPL-MCNC: 8.8 MG/DL (ref 8.4–10.2)
CHLORIDE SERPL-SCNC: 98 MMOL/L (ref 96–108)
CO2 SERPL-SCNC: 28 MMOL/L (ref 21–32)
CREAT SERPL-MCNC: 0.7 MG/DL (ref 0.6–1.3)
EOSINOPHIL # BLD AUTO: 0.1 THOUSAND/ÂΜL (ref 0–0.61)
EOSINOPHIL NFR BLD AUTO: 0 % (ref 0–6)
ERYTHROCYTE [DISTWIDTH] IN BLOOD BY AUTOMATED COUNT: 13.2 % (ref 11.6–15.1)
GFR SERPL CREATININE-BSD FRML MDRD: 99 ML/MIN/1.73SQ M
GLUCOSE SERPL-MCNC: 130 MG/DL (ref 65–140)
GLUCOSE SERPL-MCNC: 132 MG/DL (ref 65–140)
GLUCOSE SERPL-MCNC: 283 MG/DL (ref 65–140)
HCT VFR BLD AUTO: 42 % (ref 36.5–49.3)
HGB BLD-MCNC: 14 G/DL (ref 12–17)
IMM GRANULOCYTES # BLD AUTO: 0.05 THOUSAND/UL (ref 0–0.2)
IMM GRANULOCYTES NFR BLD AUTO: 0 % (ref 0–2)
LYMPHOCYTES # BLD AUTO: 17.45 THOUSANDS/ÂΜL (ref 0.6–4.47)
LYMPHOCYTES NFR BLD AUTO: 76 % (ref 14–44)
MAGNESIUM SERPL-MCNC: 1.9 MG/DL (ref 1.9–2.7)
MCH RBC QN AUTO: 31.2 PG (ref 26.8–34.3)
MCHC RBC AUTO-ENTMCNC: 33.3 G/DL (ref 31.4–37.4)
MCV RBC AUTO: 94 FL (ref 82–98)
MONOCYTES # BLD AUTO: 0.78 THOUSAND/ÂΜL (ref 0.17–1.22)
MONOCYTES NFR BLD AUTO: 3 % (ref 4–12)
NEUTROPHILS # BLD AUTO: 4.98 THOUSANDS/ÂΜL (ref 1.85–7.62)
NEUTS SEG NFR BLD AUTO: 21 % (ref 43–75)
NRBC BLD AUTO-RTO: 0 /100 WBCS
PLATELET # BLD AUTO: 149 THOUSANDS/UL (ref 149–390)
PMV BLD AUTO: 9.6 FL (ref 8.9–12.7)
POTASSIUM SERPL-SCNC: 4.3 MMOL/L (ref 3.5–5.3)
RBC # BLD AUTO: 4.49 MILLION/UL (ref 3.88–5.62)
SODIUM SERPL-SCNC: 133 MMOL/L (ref 135–147)
WBC # BLD AUTO: 23.41 THOUSAND/UL (ref 4.31–10.16)

## 2024-08-20 PROCEDURE — 99239 HOSP IP/OBS DSCHRG MGMT >30: CPT | Performed by: INTERNAL MEDICINE

## 2024-08-20 PROCEDURE — 94760 N-INVAS EAR/PLS OXIMETRY 1: CPT

## 2024-08-20 PROCEDURE — 80048 BASIC METABOLIC PNL TOTAL CA: CPT

## 2024-08-20 PROCEDURE — 82948 REAGENT STRIP/BLOOD GLUCOSE: CPT

## 2024-08-20 PROCEDURE — 94640 AIRWAY INHALATION TREATMENT: CPT

## 2024-08-20 PROCEDURE — 85025 COMPLETE CBC W/AUTO DIFF WBC: CPT

## 2024-08-20 PROCEDURE — 83735 ASSAY OF MAGNESIUM: CPT

## 2024-08-20 RX ORDER — GUAIFENESIN 600 MG/1
600 TABLET, EXTENDED RELEASE ORAL EVERY 12 HOURS SCHEDULED
Status: DISCONTINUED | OUTPATIENT
Start: 2024-08-20 | End: 2024-08-20 | Stop reason: HOSPADM

## 2024-08-20 RX ORDER — LIDOCAINE 50 MG/G
2 PATCH TOPICAL DAILY
Qty: 60 PATCH | Refills: 0 | Status: SHIPPED | OUTPATIENT
Start: 2024-08-20

## 2024-08-20 RX ORDER — TAMSULOSIN HYDROCHLORIDE 0.4 MG/1
0.8 CAPSULE ORAL
Qty: 60 CAPSULE | Refills: 0 | Status: SHIPPED | OUTPATIENT
Start: 2024-08-20 | End: 2024-08-23 | Stop reason: SDUPTHER

## 2024-08-20 RX ORDER — INSULIN GLARGINE 100 [IU]/ML
50 INJECTION, SOLUTION SUBCUTANEOUS 2 TIMES DAILY
Qty: 50 ML | Refills: 0 | Status: SHIPPED | OUTPATIENT
Start: 2024-08-20

## 2024-08-20 RX ORDER — IPRATROPIUM BROMIDE AND ALBUTEROL SULFATE 2.5; .5 MG/3ML; MG/3ML
3 SOLUTION RESPIRATORY (INHALATION)
Status: DISCONTINUED | OUTPATIENT
Start: 2024-08-20 | End: 2024-08-20 | Stop reason: HOSPADM

## 2024-08-20 RX ORDER — MAGNESIUM SULFATE HEPTAHYDRATE 40 MG/ML
2 INJECTION, SOLUTION INTRAVENOUS ONCE
Status: COMPLETED | OUTPATIENT
Start: 2024-08-20 | End: 2024-08-20

## 2024-08-20 RX ORDER — GUAIFENESIN 600 MG/1
600 TABLET, EXTENDED RELEASE ORAL 2 TIMES DAILY PRN
Qty: 15 TABLET | Refills: 0 | Status: SHIPPED | OUTPATIENT
Start: 2024-08-20 | End: 2024-08-23 | Stop reason: SDUPTHER

## 2024-08-20 RX ORDER — INSULIN LISPRO 200 [IU]/ML
15 INJECTION, SOLUTION SUBCUTANEOUS
Qty: 6 ML | Refills: 0 | Status: SHIPPED | OUTPATIENT
Start: 2024-08-20

## 2024-08-20 RX ORDER — TIZANIDINE 2 MG/1
4 TABLET ORAL EVERY 8 HOURS
Status: DISCONTINUED | OUTPATIENT
Start: 2024-08-20 | End: 2024-08-20 | Stop reason: HOSPADM

## 2024-08-20 RX ADMIN — TIZANIDINE 4 MG: 2 TABLET ORAL at 09:09

## 2024-08-20 RX ADMIN — BUSPIRONE HYDROCHLORIDE 10 MG: 10 TABLET ORAL at 08:54

## 2024-08-20 RX ADMIN — MAGNESIUM SULFATE HEPTAHYDRATE 2 G: 40 INJECTION, SOLUTION INTRAVENOUS at 09:56

## 2024-08-20 RX ADMIN — HYDROMORPHONE HYDROCHLORIDE 0.5 MG: 1 INJECTION, SOLUTION INTRAMUSCULAR; INTRAVENOUS; SUBCUTANEOUS at 05:38

## 2024-08-20 RX ADMIN — LIDOCAINE 5% 2 PATCH: 700 PATCH TOPICAL at 08:57

## 2024-08-20 RX ADMIN — LOSARTAN POTASSIUM 50 MG: 50 TABLET, FILM COATED ORAL at 08:54

## 2024-08-20 RX ADMIN — GABAPENTIN 800 MG: 400 CAPSULE ORAL at 13:33

## 2024-08-20 RX ADMIN — FLUTICASONE FUROATE AND VILANTEROL TRIFENATATE 1 PUFF: 100; 25 POWDER RESPIRATORY (INHALATION) at 09:03

## 2024-08-20 RX ADMIN — TIZANIDINE 4 MG: 2 TABLET ORAL at 01:59

## 2024-08-20 RX ADMIN — UMECLIDINIUM 1 PUFF: 62.5 AEROSOL, POWDER ORAL at 09:03

## 2024-08-20 RX ADMIN — Medication 1 TABLET: at 08:56

## 2024-08-20 RX ADMIN — INSULIN LISPRO 4 UNITS: 100 INJECTION, SOLUTION INTRAVENOUS; SUBCUTANEOUS at 13:29

## 2024-08-20 RX ADMIN — APIXABAN 5 MG: 5 TABLET, FILM COATED ORAL at 08:56

## 2024-08-20 RX ADMIN — IPRATROPIUM BROMIDE AND ALBUTEROL SULFATE 3 ML: .5; 3 SOLUTION RESPIRATORY (INHALATION) at 07:21

## 2024-08-20 RX ADMIN — GABAPENTIN 800 MG: 400 CAPSULE ORAL at 08:55

## 2024-08-20 RX ADMIN — DIGOXIN 250 MCG: 125 TABLET ORAL at 09:02

## 2024-08-20 RX ADMIN — DOCUSATE SODIUM 100 MG: 100 CAPSULE, LIQUID FILLED ORAL at 08:54

## 2024-08-20 RX ADMIN — IPRATROPIUM BROMIDE AND ALBUTEROL SULFATE 3 ML: .5; 3 SOLUTION RESPIRATORY (INHALATION) at 01:29

## 2024-08-20 RX ADMIN — SPIRONOLACTONE 25 MG: 25 TABLET ORAL at 08:54

## 2024-08-20 RX ADMIN — INSULIN GLARGINE 45 UNITS: 100 INJECTION, SOLUTION SUBCUTANEOUS at 08:59

## 2024-08-20 RX ADMIN — IPRATROPIUM BROMIDE AND ALBUTEROL SULFATE 3 ML: .5; 3 SOLUTION RESPIRATORY (INHALATION) at 13:25

## 2024-08-20 RX ADMIN — OXYCODONE HYDROCHLORIDE AND ACETAMINOPHEN 2 TABLET: 5; 325 TABLET ORAL at 01:19

## 2024-08-20 RX ADMIN — SERTRALINE HYDROCHLORIDE 50 MG: 50 TABLET ORAL at 08:55

## 2024-08-20 RX ADMIN — GUAIFENESIN 600 MG: 600 TABLET, EXTENDED RELEASE ORAL at 09:09

## 2024-08-20 RX ADMIN — HYDROMORPHONE HYDROCHLORIDE 0.5 MG: 1 INJECTION, SOLUTION INTRAMUSCULAR; INTRAVENOUS; SUBCUTANEOUS at 13:42

## 2024-08-20 RX ADMIN — SODIUM CHLORIDE 1 G: 1 TABLET ORAL at 08:53

## 2024-08-20 RX ADMIN — METOPROLOL SUCCINATE 200 MG: 100 TABLET, EXTENDED RELEASE ORAL at 08:54

## 2024-08-20 RX ADMIN — INSULIN LISPRO 10 UNITS: 100 INJECTION, SOLUTION INTRAVENOUS; SUBCUTANEOUS at 13:37

## 2024-08-20 RX ADMIN — OXYCODONE HYDROCHLORIDE AND ACETAMINOPHEN 2 TABLET: 5; 325 TABLET ORAL at 08:55

## 2024-08-20 RX ADMIN — INSULIN LISPRO 10 UNITS: 100 INJECTION, SOLUTION INTRAVENOUS; SUBCUTANEOUS at 08:43

## 2024-08-20 NOTE — PLAN OF CARE
Problem: Potential for Falls  Goal: Patient will remain free of falls  Description: INTERVENTIONS:  - Educate patient/family on patient safety including physical limitations  - Instruct patient to call for assistance with activity   - Consult OT/PT to assist with strengthening/mobility   - Keep Call bell within reach  - Keep bed low and locked with side rails adjusted as appropriate  - Keep care items and personal belongings within reach  - Initiate and maintain comfort rounds  - Make Fall Risk Sign visible to staff  - Offer Toileting every 2 Hours, in advance of need  - Initiate/Maintain bed alarm  - Obtain necessary fall risk management equipment: bed alarm  - Apply yellow socks and bracelet for high fall risk patients  - Consider moving patient to room near nurses station  Outcome: Progressing     Problem: PAIN - ADULT  Goal: Verbalizes/displays adequate comfort level or baseline comfort level  Description: Interventions:  - Encourage patient to monitor pain and request assistance  - Assess pain using appropriate pain scale  - Administer analgesics based on type and severity of pain and evaluate response  - Implement non-pharmacological measures as appropriate and evaluate response  - Consider cultural and social influences on pain and pain management  - Notify physician/advanced practitioner if interventions unsuccessful or patient reports new pain  Outcome: Progressing     Problem: INFECTION - ADULT  Goal: Absence or prevention of progression during hospitalization  Description: INTERVENTIONS:  - Assess and monitor for signs and symptoms of infection  - Monitor lab/diagnostic results  - Monitor all insertion sites, i.e. indwelling lines, tubes, and drains  - Administer medications as ordered  - Instruct and encourage patient and family to use good hand hygiene technique  - Identify and instruct in appropriate isolation precautions for identified infection/condition  Outcome: Progressing  Goal: Absence of  fever/infection during neutropenic period  Description: INTERVENTIONS:  - Monitor WBC    Outcome: Progressing     Problem: SAFETY ADULT  Goal: Patient will remain free of falls  Description: INTERVENTIONS:  - Educate patient/family on patient safety including physical limitations  - Instruct patient to call for assistance with activity   - Consult OT/PT to assist with strengthening/mobility   - Keep Call bell within reach  - Keep bed low and locked with side rails adjusted as appropriate  - Keep care items and personal belongings within reach  - Initiate and maintain comfort rounds  - Make Fall Risk Sign visible to staff  - Offer Toileting every 2 Hours, in advance of need  - Initiate/Maintain bed alarm  - Obtain necessary fall risk management equipment: bed alarm  - Apply yellow socks and bracelet for high fall risk patients  - Consider moving patient to room near nurses station  Outcome: Progressing  Goal: Maintain or return to baseline ADL function  Description: INTERVENTIONS:  - Educate patient/family on patient safety including physical limitations  - Instruct patient to call for assistance with activity   - Consult OT/PT to assist with strengthening/mobility   - Keep Call bell within reach  - Keep bed low and locked with side rails adjusted as appropriate  - Keep care items and personal belongings within reach  - Initiate and maintain comfort rounds  - Make Fall Risk Sign visible to staff  - Offer Toileting every 2 Hours, in advance of need  - Initiate/Maintain bed alarm  - Obtain necessary fall risk management equipment: bed alarm  - Apply yellow socks and bracelet for high fall risk patients  - Consider moving patient to room near nurses station  Outcome: Progressing  Goal: Maintains/Returns to pre admission functional level  Description: INTERVENTIONS:  - Perform AM-PAC 6 Click Basic Mobility/ Daily Activity assessment daily.  - Set and communicate daily mobility goal to care team and  patient/family/caregiver.   - Collaborate with rehabilitation services on mobility goals if consulted  - Perform Range of Motion 2 times a day.  - Reposition patient every 2 hours.  - Dangle patient 2 times a day  - Stand patient 2 times a day  - Ambulate patient 2 times a day  - Out of bed to chair 2 times a day   - Out of bed for meals 2  Problem: DISCHARGE PLANNING  Goal: Discharge to home or other facility with appropriate resources  Description: INTERVENTIONS:  - Identify barriers to discharge w/patient and caregiver  - Arrange for needed discharge resources and transportation as appropriate  - Identify discharge learning needs (meds, wound care, etc.)  - Refer to Case Management Department for coordinating discharge planning if the patient needs post-hospital services based on physician/advanced practitioner order or complex needs related to functional status, cognitive ability, or social support system  Outcome: Progressing     Problem: Knowledge Deficit  Goal: Patient/family/caregiver demonstrates understanding of disease process, treatment plan, medications, and discharge instructions  Description: Complete learning assessment and assess knowledge base.  Interventions:  - Provide teaching at level of understanding  - Provide teaching via preferred learning methods  Outcome: Progressing     Problem: Knowledge Deficit  Goal: Patient/family/caregiver demonstrates understanding of disease process, treatment plan, medications, and discharge instructions  Description: Complete learning assessment and assess knowledge base.  Interventions:  - Provide teaching at level of understanding  - Provide teaching via preferred learning methods  Outcome: Progressing     Problem: NEUROSENSORY - ADULT  Goal: Achieves stable or improved neurological status  Description: INTERVENTIONS  - Monitor and report changes in neurological status  - Monitor vital signs such as temperature, blood pressure, glucose, and any other labs  ordered   - Initiate measures to prevent increased intracranial pressure  - Monitor for seizure activity and implement precautions if appropriate      Outcome: Progressing  Goal: Remains free of injury related to seizures activity  Description: INTERVENTIONS  - Maintain airway, patient safety  and administer oxygen as ordered  - Monitor patient for seizure activity, document and report duration and description of seizure to physician/advanced practitioner  - If seizure occurs,  ensure patient safety during seizure  - Reorient patient post seizure  - Seizure pads on all 4 side rails  - Instruct patient/family to notify RN of any seizure activity including if an aura is experienced  - Instruct patient/family to call for assistance with activity based on nursing assessment  - Administer anti-seizure medications if ordered    Outcome: Progressing  Goal: Achieves maximal functionality and self care  Description: INTERVENTIONS  - Monitor swallowing and airway patency with patient fatigue and changes in neurological status  - Encourage and assist patient to increase activity and self care.   - Encourage visually impaired, hearing impaired and aphasic patients to use assistive/communication devices  Outcome: Progressing     Problem: RESPIRATORY - ADULT  Goal: Achieves optimal ventilation and oxygenation  Description: INTERVENTIONS:  - Assess for changes in respiratory status  - Assess for changes in mentation and behavior  - Position to facilitate oxygenation and minimize respiratory effort  - Oxygen administered by appropriate delivery if ordered  - Initiate smoking cessation education as indicated  - Encourage broncho-pulmonary hygiene including cough, deep breathe, Incentive Spirometry  - Assess the need for suctioning and aspirate as needed  - Assess and instruct to report SOB or any respiratory difficulty  - Respiratory Therapy support as indicated  Outcome: Progressing     Problem: Nutrition/Hydration-ADULT  Goal:  Nutrient/Hydration intake appropriate for improving, restoring or maintaining nutritional needs  Description: Monitor and assess patient's nutrition/hydration status for malnutrition. Collaborate with interdisciplinary team and initiate plan and interventions as ordered.  Monitor patient's weight and dietary intake as ordered or per policy. Utilize nutrition screening tool and intervene as necessary. Determine patient's food preferences and provide high-protein, high-caloric foods as appropriate.     INTERVENTIONS:  - Monitor oral intake, urinary output, labs, and treatment plans  - Assess nutrition and hydration status and recommend course of action  - Evaluate amount of meals eaten  - Assist patient with eating if necessary   - Allow adequate time for meals  - Recommend/ encourage appropriate diets, oral nutritional supplements, and vitamin/mineral supplements  - Order, calculate, and assess calorie counts as needed  - Recommend, monitor, and adjust tube feedings and TPN/PPN based on assessed needs  - Assess need for intravenous fluids  - Provide specific nutrition/hydration education as appropriate  - Include patient/family/caregiver in decisions related to nutrition  Outcome: Progressing    times a day  - Out of bed for toileting  - Record patient progress and toleration of activity level   Outcome: Progressing

## 2024-08-20 NOTE — CASE MANAGEMENT
Case Management Discharge Planning Note    Patient name Alonzo Watson  Location [address] [address]/[address] [address]-* MRN [id number]  : 1960 Date 2024       Current Admission Date: 2024  Current Admission Diagnosis:Leukocytosis   Patient Active Problem List    Diagnosis Date Noted Date Diagnosed    Leukocytosis 2024     Urinary retention 2024     Sciatica of left side 2024     Muscle spasms of both lower extremities 2024     Food insecurity 2024     Chronic intractable pain 04/15/2024     Gastroparesis 2024     Irritable bowel syndrome with diarrhea 2024     Hx of adenomatous colonic polyps 2024     Chronic obstructive pulmonary disease, unspecified COPD type ([facility]) 2023     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     Unspecified lack of coordination 10/25/2023     Other symbolic dysfunctions 10/25/2023     Other abnormalities of gait and mobility 10/25/2023     Severe pain 2023     Morbid (severe) obesity with alveolar hypoventilation ([facility]) 2023     Acute sensory neuropathy 2023     Allergies 2023     Hepatosplenomegaly 2023     Ambulatory dysfunction 2023     Immunodeficiency, unspecified ([facility]) 2022     CLL (chronic lymphocytic leukemia) ([facility]) 2022     Hypo-osmolality and hyponatremia 2022     Chronic lymphocytic leukemia of B-cell type in remission ([facility]) 2022     Chronic obstructive pulmonary disease, unspecified ([facility]) 2022     Chronic pain syndrome 2022     Foraminal stenosis of lumbar region 2022     Lumbar post-laminectomy syndrome 2022     Lumbar radiculopathy 2022     Dysuria 2021     Other intervertebral disc degeneration, lumbar region 2021     Bipolar disorder (HCC) 2021     Paroxysmal atrial fibrillation ([facility]) 2021     Chronic respiratory failure with hypoxia ([facility]) 2021     Chronic kidney disease, stage 2 (mild) 2021      Atherosclerotic heart disease of native coronary artery without angina pectoris 11/28/2021     Peripheral neuropathy 11/26/2021     Muscle weakness (generalized) 09/21/2021     Platelets decreased (HCC) 08/06/2021     Heart failure (HCC) 11/17/2020     Nutritional anemia, unspecified  10/29/2020     Chest pain 10/11/2020     Transition of care performed with sharing of clinical summary 04/11/2020     Obstructive sleep apnea 02/02/2020     Chronic a-fib ([facility]) 02/02/2020     Type 2 diabetes mellitus with complication, with long-term current use of insulin ([facility]) 06/21/2019     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 ([facility]) 03/25/2019     Lung disease, restrictive 11/21/2018     Chronic respiratory failure ([facility]) 10/31/2018     Coronary artery disease 09/06/2017     Esophageal reflux 09/06/2017     Chronic low back pain 11/30/2016     SHAVONNE and COPD overlap syndrome       Morbid obesity       Fatigue 09/02/2016     Hyponatremia 09/02/2016     Psychiatric disorder      Erectile dysfunction of organic origin 08/25/2014     Essential hypertension 09/04/2012     Diabetic neuropathy (HCC) 09/04/2012     Panic disorder without agoraphobia 09/04/2012       LOS (days): 2  Geometric Mean LOS (GMLOS) (days): 2.9  Days to GMLOS:0.6     OBJECTIVE:  Risk of Unplanned Readmission Score: 30.53     Current admission status: Inpatient   Preferred Pharmacy:   [facility] [address] [address]  Phone: [phone] Fax: [phone]    Primary Care Provider: MANAS Payne    Primary Insurance: [employer]  Secondary Insurance:     DISCHARGE DETAILS:    Requested Home Health Care         Home Health Discipline requested:: Nursing, Occupational Therapy, Physical Therapy    Other Referral/Resources/Interventions Provided:  Referral Comments:  reserved [facility] in [place].    Number/Name of Dispatcher:  [employer]  Transported by (Company and Unit #): [id number]     ETA of Transport (Time): 1330      CM made Resident Physician aware that skilled nursing also needs to be added to ambulatory referral for discharge. Home services for patient should include PT, OT, and SN. Resident Physician confirmed SN was added. CM made [facility] aware of the update to services for patient.

## 2024-08-20 NOTE — PLAN OF CARE
Problem: Potential for Falls  Goal: Patient will remain free of falls  Description: INTERVENTIONS:  - Educate patient/family on patient safety including physical limitations  - Instruct patient to call for assistance with activity   - Consult OT/PT to assist with strengthening/mobility   - Keep Call bell within reach  - Keep bed low and locked with side rails adjusted as appropriate  - Keep care items and personal belongings within reach  - Initiate and maintain comfort rounds  - Make Fall Risk Sign visible to staff  - Offer Toileting every 2 Hours, in advance of need  - Initiate/Maintain bed alarm  - Obtain necessary fall risk management equipment: bed alarm  - Apply yellow socks and bracelet for high fall risk patients  - Consider moving patient to room near nurses station  Outcome: Progressing     Problem: PAIN - ADULT  Goal: Verbalizes/displays adequate comfort level or baseline comfort level  Description: Interventions:  - Encourage patient to monitor pain and request assistance  - Assess pain using appropriate pain scale  - Administer analgesics based on type and severity of pain and evaluate response  - Implement non-pharmacological measures as appropriate and evaluate response  - Consider cultural and social influences on pain and pain management  - Notify physician/advanced practitioner if interventions unsuccessful or patient reports new pain  Outcome: Progressing     Problem: INFECTION - ADULT  Goal: Absence or prevention of progression during hospitalization  Description: INTERVENTIONS:  - Assess and monitor for signs and symptoms of infection  - Monitor lab/diagnostic results  - Monitor all insertion sites, i.e. indwelling lines, tubes, and drains  - Administer medications as ordered  - Instruct and encourage patient and family to use good hand hygiene technique  - Identify and instruct in appropriate isolation precautions for identified infection/condition  Outcome: Progressing  Goal: Absence of  fever/infection during neutropenic period  Description: INTERVENTIONS:  - Monitor WBC    Outcome: Progressing     Problem: SAFETY ADULT  Goal: Patient will remain free of falls  Description: INTERVENTIONS:  - Educate patient/family on patient safety including physical limitations  - Instruct patient to call for assistance with activity   - Consult OT/PT to assist with strengthening/mobility   - Keep Call bell within reach  - Keep bed low and locked with side rails adjusted as appropriate  - Keep care items and personal belongings within reach  - Initiate and maintain comfort rounds  - Make Fall Risk Sign visible to staff  - Offer Toileting every 2 Hours, in advance of need  - Initiate/Maintain bed alarm  - Obtain necessary fall risk management equipment: bed alarm  - Apply yellow socks and bracelet for high fall risk patients  - Consider moving patient to room near nurses station  Outcome: Progressing  Goal: Maintain or return to baseline ADL function  Description: INTERVENTIONS:  - Educate patient/family on patient safety including physical limitations  - Instruct patient to call for assistance with activity   - Consult OT/PT to assist with strengthening/mobility   - Keep Call bell within reach  - Keep bed low and locked with side rails adjusted as appropriate  - Keep care items and personal belongings within reach  - Initiate and maintain comfort rounds  - Make Fall Risk Sign visible to staff  - Offer Toileting every 2 Hours, in advance of need  - Initiate/Maintain bed alarm  - Obtain necessary fall risk management equipment: bed alarm  - Apply yellow socks and bracelet for high fall risk patients  - Consider moving patient to room near nurses station  Outcome: Progressing  Goal: Maintains/Returns to pre admission functional level  Description: INTERVENTIONS:  - Perform AM-PAC 6 Click Basic Mobility/ Daily Activity assessment daily.  - Set and communicate daily mobility goal to care team and  patient/family/caregiver.   - Collaborate with rehabilitation services on mobility goals if consulted  - Perform Range of Motion 3 times a day.  - Reposition patient every 2 hours.  - Dangle patient 3 times a day  - Stand patient 3 times a day  - Ambulate patient 3 times a day  - Out of bed to chair 3 times a day   - Out of bed for meals 3 times a day  - Out of bed for toileting  - Record patient progress and toleration of activity level   Outcome: Progressing     Problem: DISCHARGE PLANNING  Goal: Discharge to home or other facility with appropriate resources  Description: INTERVENTIONS:  - Identify barriers to discharge w/patient and caregiver  - Arrange for needed discharge resources and transportation as appropriate  - Identify discharge learning needs (meds, wound care, etc.)  - Refer to Case Management Department for coordinating discharge planning if the patient needs post-hospital services based on physician/advanced practitioner order or complex needs related to functional status, cognitive ability, or social support system  Outcome: Progressing     Problem: Knowledge Deficit  Goal: Patient/family/caregiver demonstrates understanding of disease process, treatment plan, medications, and discharge instructions  Description: Complete learning assessment and assess knowledge base.  Interventions:  - Provide teaching at level of understanding  - Provide teaching via preferred learning methods  Outcome: Progressing     Problem: NEUROSENSORY - ADULT  Goal: Achieves stable or improved neurological status  Description: INTERVENTIONS  - Monitor and report changes in neurological status  - Monitor vital signs such as temperature, blood pressure, glucose, and any other labs ordered   - Initiate measures to prevent increased intracranial pressure  - Monitor for seizure activity and implement precautions if appropriate      Outcome: Progressing  Goal: Remains free of injury related to seizures activity  Description:  INTERVENTIONS  - Maintain airway, patient safety  and administer oxygen as ordered  - Monitor patient for seizure activity, document and report duration and description of seizure to physician/advanced practitioner  - If seizure occurs,  ensure patient safety during seizure  - Reorient patient post seizure  - Seizure pads on all 4 side rails  - Instruct patient/family to notify RN of any seizure activity including if an aura is experienced  - Instruct patient/family to call for assistance with activity based on nursing assessment  - Administer anti-seizure medications if ordered    Outcome: Progressing  Goal: Achieves maximal functionality and self care  Description: INTERVENTIONS  - Monitor swallowing and airway patency with patient fatigue and changes in neurological status  - Encourage and assist patient to increase activity and self care.   - Encourage visually impaired, hearing impaired and aphasic patients to use assistive/communication devices  Outcome: Progressing     Problem: RESPIRATORY - ADULT  Goal: Achieves optimal ventilation and oxygenation  Description: INTERVENTIONS:  - Assess for changes in respiratory status  - Assess for changes in mentation and behavior  - Position to facilitate oxygenation and minimize respiratory effort  - Oxygen administered by appropriate delivery if ordered  - Initiate smoking cessation education as indicated  - Encourage broncho-pulmonary hygiene including cough, deep breathe, Incentive Spirometry  - Assess the need for suctioning and aspirate as needed  - Assess and instruct to report SOB or any respiratory difficulty  - Respiratory Therapy support as indicated  Outcome: Progressing     Problem: Nutrition/Hydration-ADULT  Goal: Nutrient/Hydration intake appropriate for improving, restoring or maintaining nutritional needs  Description: Monitor and assess patient's nutrition/hydration status for malnutrition. Collaborate with interdisciplinary team and initiate plan and  interventions as ordered.  Monitor patient's weight and dietary intake as ordered or per policy. Utilize nutrition screening tool and intervene as necessary. Determine patient's food preferences and provide high-protein, high-caloric foods as appropriate.     INTERVENTIONS:  - Monitor oral intake, urinary output, labs, and treatment plans  - Assess nutrition and hydration status and recommend course of action  - Evaluate amount of meals eaten  - Assist patient with eating if necessary   - Allow adequate time for meals  - Recommend/ encourage appropriate diets, oral nutritional supplements, and vitamin/mineral supplements  - Order, calculate, and assess calorie counts as needed  - Recommend, monitor, and adjust tube feedings and TPN/PPN based on assessed needs  - Assess need for intravenous fluids  - Provide specific nutrition/hydration education as appropriate  - Include patient/family/caregiver in decisions related to nutrition  Outcome: Progressing

## 2024-08-20 NOTE — CASE MANAGEMENT
Case Management Discharge Planning Note    Patient name Alonzo Watson  Location [address] [address]/[address] [place] [address]-* MRN [id number]  : 1960 Date 2024       Current Admission Date: 2024  Current Admission Diagnosis:Muscle spasms of both lower extremities   Patient Active Problem List    Diagnosis Date Noted Date Diagnosed    Leukocytosis 2024     Urinary retention 2024     Sciatica of left side 2024     Muscle spasms of both lower extremities 2024     Food insecurity 2024     Chronic intractable pain 04/15/2024     Gastroparesis 2024     Irritable bowel syndrome with diarrhea 2024     Hx of adenomatous colonic polyps 2024     Chronic obstructive pulmonary disease, unspecified COPD type ([facility]) 2023     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     Unspecified lack of coordination 10/25/2023     Other symbolic dysfunctions 10/25/2023     Other abnormalities of gait and mobility 10/25/2023     Severe pain 2023     Morbid (severe) obesity with alveolar hypoventilation ([facility]) 2023     Acute sensory neuropathy 2023     Allergies 2023     Hepatosplenomegaly 2023     Ambulatory dysfunction 2023     Immunodeficiency, unspecified ([facility]) 2022     CLL (chronic lymphocytic leukemia) ([facility]) 2022     Hypo-osmolality and hyponatremia 2022     Chronic lymphocytic leukemia of B-cell type in remission ([facility]) 2022     Chronic obstructive pulmonary disease, unspecified ([facility]) 2022     Chronic pain syndrome 2022     Foraminal stenosis of lumbar region 2022     Lumbar post-laminectomy syndrome 2022     Lumbar radiculopathy 2022     Dysuria 2021     Other intervertebral disc degeneration, lumbar region 2021     Bipolar disorder ([facility]) 2021     Paroxysmal atrial fibrillation ([facility]) 2021     Chronic respiratory failure with hypoxia ([facility]) 2021     Chronic kidney disease, stage 2  (mild) 11/28/2021     Atherosclerotic heart disease of native coronary artery without angina pectoris 11/28/2021     Peripheral neuropathy 11/26/2021     Muscle weakness (generalized) 09/21/2021     Platelets decreased (HCC) 08/06/2021     Heart failure (HCC) 11/17/2020     Nutritional anemia, unspecified  10/29/2020     Chest pain 10/11/2020     Transition of care performed with sharing of clinical summary 04/11/2020     Obstructive sleep apnea 02/02/2020     Chronic a-fib ([facility]) 02/02/2020     Type 2 diabetes mellitus with complication, with long-term current use of insulin ([facility]) 06/21/2019     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 ([facility]) 03/25/2019     Lung disease, restrictive 11/21/2018     Chronic respiratory failure ([facility]) 10/31/2018     Coronary artery disease 09/06/2017     Esophageal reflux 09/06/2017     Chronic low back pain 11/30/2016     SHAVONNE and COPD overlap syndrome       Morbid obesity       Fatigue 09/02/2016     Hyponatremia 09/02/2016     Psychiatric disorder      Erectile dysfunction of organic origin 08/25/2014     Essential hypertension 09/04/2012     Diabetic neuropathy (HCC) 09/04/2012     Panic disorder without agoraphobia 09/04/2012       LOS (days): 2  Geometric Mean LOS (GMLOS) (days): 2.9  Days to GMLOS:0.7     OBJECTIVE:  Risk of Unplanned Readmission Score: 30.53     Current admission status: Inpatient   Preferred Pharmacy:   [facility] [address] [address]  Phone: [phone] Fax: [phone]    Primary Care Provider: MANAS Pyane    Primary Insurance: [employer]  Secondary Insurance:     DISCHARGE DETAILS:    Discharge planning discussed with:: Patient    Requested Home Health Care         Home Health Discipline requested:: Physical Therapy, Occupational Therapy  [facility] External Referral Reason (only  applicable if external HHA name selected): Patient has established relationship with provider  Home Health Follow-Up Provider:: PCP  Home Health Services Needed:: Evaluate Functional Status and Safety, Gait/ADL Training, Strengthening/Theraputic Exercises to Improve Function  Homebound Criteria Met:: Requires the Assistance of Another Person for Safe Ambulation or to Leave the Home, Uses an Assist Device (i.e. cane, walker, etc)  Supporting Clincal Findings:: Limited Endurance, Fatigues Easliy in Short Distances    Other Referral/Resources/Interventions Provided:  Interventions: Transportation  Referral Comments: CM requested [facility] transportation in [place].    Treatment Team Recommendation: Home with Home Health Care  Discharge Destination Plan:: Home with Home Health Care  Transport at Discharge : [facility]  Dispatcher Contacted: Yes  Number/Name of Dispatcher: [place]  Transported by (Company and Unit #): TBD  ETA of Transport (Date): 08/20/24     Transfer Mode: Stretcher      CM met with patient bedside to introduce self and discuss discharge planning. CM made patient aware that [facility] was hesitant to accept him back on services due to the fact that his medical insurance coverage expires at the end of the month. CM will update patient before discharge if [facility] will accept.     CM made patient aware [facility] will accept and can only provide 3 visits for the remainder of the month. Erika from [facility] asked CM to explain to patient how important it is for him to complete his application for renewal of coverage. If benefits are not renewed, [facility] will no longer be able to provide services to patient. Patient verbalized his understanding.  provided contact number ([phone]) for Erika at [facility] for patient to contact once the paperwork for continued coverage has been completed.

## 2024-08-20 NOTE — DISCHARGE INSTR - AVS FIRST PAGE
Monitor blood sugars and maintain diabetic diet.    Continue Physical therapy for continued back and neck pain.    Follow up with Oncology for CLL management.     Follow up with Urology for follow up cytoscopy.     Start MUCINEX 600 mg twice a day as needed.    Apply 2 Lidocaine patches over 12 hours on affected area as needed.     Start FLOMAX 0.4 mg each, take 2 capsules daily with dinner.

## 2024-08-20 NOTE — DISCHARGE INSTRUCTIONS
"Patient Education     Diabetes and diet   The Basics   Written by the doctors and editors at [facility]   Why is diet important if I have diabetes? -- Diet is important because it is part of diabetes treatment. Many people need to change what they eat and how much they eat to help treat their diabetes. It is important for people to treat their diabetes so they:   Keep their blood sugar at goal   Prevent long-term problems, such as heart or kidney problems, that can happen in people with diabetes  Changing your diet can also help treat obesity, high blood pressure, and high cholesterol. These conditions can affect people with diabetes and can lead to future problems, such as heart attacks or strokes.  Who will help me change my diet? -- Your doctor or nurse will work with you to make a food plan to change your diet. They might also recommend that you work with a dietitian. A dietitian is an expert on food and eating.  Do I need to eat at the same times every day? -- When and how often you should eat depends, in part, on the diabetes medicines you take. For example:   People who take about the same amount of insulin at the same time each day (called a \"fixed regimen\") should eat meals at the same times. This is also true for people who take pills that increase insulin levels, such as sulfonylureas. Eating meals at the same time every day helps prevent low blood sugar.   People who adjust the dose and timing of their insulin each day (called a \"flexible regimen\") do not always have to eat meals at the same time. That's because they can time their insulin dose for before they plan to eat, and also adjust the dose for how much they plan to eat.   People who take medicines that don't usually cause low blood sugar, such as metformin, don't have to eat meals at the same time every day.  What do I need to think about when planning what to eat? -- Our bodies break down the food we eat into small pieces called carbohydrates, " "proteins, and fats.  When planning what to eat, people with diabetes need to think about:   Carbohydrates (or \"carbs\") - These are sugars the body uses for energy. They can raise your blood sugar level. Your doctor, nurse, or dietitian will tell you how many carbs you should eat at each meal or snack. Foods that have carbs include:   Bread, pasta, and rice   Vegetables and fruits   Dairy foods   Foods and drinks with added sugar  It is best to get your carbs from fruits, vegetables, whole grains, and low-fat milk. It is best to avoid drinks with added sugar, like soda, juices, and sports drinks.   Protein - Your doctor, nurse, or dietitian will tell you how much protein you should eat each day. It is best to eat lean meats, fish, eggs, beans, peas, soy products, nuts, and seeds. Avoid or limit processed meats like mckeon, hot dogs, and sausages.   Fats - The type of fat you eat is more important than the amount of fat. \"Saturated\" and \"trans\" fats can increase your risk for heart problems, like a heart attack.   Foods that have saturated fats include meat, butter, cheese, and ice cream.   Foods that have trans fats include processed food with \"partially hydrogenated oils\" on the ingredient list. This might include fried foods, store-bought cookies, muffins, pies, and cakes.  \"Monounsaturated\" and \"polyunsaturated\" fats are better for you. Foods with these types of fat include fish, avocado, olive oil, and nuts.   Calories - You need to eat a certain amount of calories each day to keep your weight the same. If you have excess body weight and want to lose weight, you need to eat fewer calories each day.   Fiber - Eating foods with a lot of fiber can help manage your blood sugar level. Foods that have a lot of fiber include apples, green beans, peas, beans, lentils, nuts, oatmeal, and whole grains.   Salt - People who have high blood pressure should not eat foods that contain a lot of salt (also called sodium). People " with high blood pressure should also eat healthy foods, such as fruits, vegetables, and low-fat dairy foods.   Alcohol and sugary drinks - Having more than 1 alcoholic drink (for females) or 2 drinks (for males) a day can raise blood sugar levels. Also, sugary drinks like fruit juice or soda can raise blood sugar levels.  How can I lose weight? -- You can:   Exercise - Try to get at least 30 minutes of physical activity a day, most days of the week. Even gentle exercise, like walking, is good for your health. Some people with diabetes need to change their medicine dose before they exercise. They might also need to check their blood sugar levels before and after exercising.   Eat fewer calories - Your doctor, nurse, or dietitian can tell you how many calories you should eat each day to lose weight.  If you are worried about your weight, size, or shape, talk with your doctor, nurse, or dietitian. They can help you make changes to improve your health.  Can I eat the same foods as my family? -- Yes. You do not need to eat special foods if you have diabetes. You and your family can eat the same foods. Changing your diet is mostly about eating healthy foods in healthy amounts.  What are the other parts of diabetes treatment? -- Besides changing your diet, the other parts of diabetes treatment are:   Exercise   Medicines  Some people with diabetes need to learn how to match their diet and exercise with their medicine dose. For example, people who use insulin might need to choose the dose of insulin they give themselves. To choose their dose, they need to think about:   What they plan to eat at the next meal   How much exercise they plan to do   What their blood sugar level is  If the diet and exercise do not match the medicine dose, a person's blood sugar level can get too low or too high. Blood sugar levels that are too low or too high can cause problems.  All topics are updated as new evidence becomes available and our  peer review process is complete.  This topic retrieved from [employer] on: Mar 27, 2024.  Topic 19404 Version 13.0  Release: 32.2.4 - C32.85  © 2024 UpToDate, Inc. and/or its affiliates. All rights reserved.  Consumer Information Use and Disclaimer   Disclaimer: This generalized information is a limited summary of diagnosis, treatment, and/or medication information. It is not meant to be comprehensive and should be used as a tool to help the user understand and/or assess potential diagnostic and treatment options. It does NOT include all information about conditions, treatments, medications, side effects, or risks that may apply to a specific patient. It is not intended to be medical advice or a substitute for the medical advice, diagnosis, or treatment of a health care provider based on the health care provider's examination and assessment of a patient's specific and unique circumstances. Patients must speak with a health care provider for complete information about their health, medical questions, and treatment options, including any risks or benefits regarding use of medications. This information does not endorse any treatments or medications as safe, effective, or approved for treating a specific patient. UpToDate, Inc. and its affiliates disclaim any warranty or liability relating to this information or the use thereof.The use of this information is governed by the Terms of Use, available at https://www.woltersStaplesuwer.com/en/know/clinical-effectiveness-terms. 2024© UpToDate, Inc. and its affiliates and/or licensors. All rights reserved.  Copyright   © 2024 UpToDate, Inc. and/or its affiliates. All rights reserved.

## 2024-08-21 ENCOUNTER — TRANSITIONAL CARE MANAGEMENT (OUTPATIENT)
Age: 64
End: 2024-08-21

## 2024-08-21 DIAGNOSIS — M62.838 MUSCLE SPASMS OF BOTH LOWER EXTREMITIES: ICD-10-CM

## 2024-08-21 NOTE — UTILIZATION REVIEW
NOTIFICATION OF ADMISSION DISCHARGE   This is a Notification of Discharge from [facility]. Please be advised that this patient has been discharge from our facility. Below you will find the admission and discharge date and time including the patient’s disposition.   UTILIZATION REVIEW CONTACT:  Sarah Bajwa  Utilization   Network Utilization Review Department  Phone: [phone] x carefully listen to the prompts. All voicemails are confidential.  Email: [email or web address]     ADMISSION INFORMATION  PRESENTATION DATE: 8/18/2024  5:41 AM  OBERVATION ADMISSION DATE: N/A  INPATIENT ADMISSION DATE: 8/18/24  7:18 AM   DISCHARGE DATE: 8/20/2024  2:09 PM   DISPOSITION:Home with Home Health Care    Network Utilization Review Department  ATTENTION: Please call with any questions or concerns to [phone] and carefully listen to the prompts so that you are directed to the right person. All voicemails are confidential.   For Discharge needs, contact Care Management DC Support Team at [phone] opt. 2  Send all requests for admission clinical reviews, approved or denied determinations and any other requests to dedicated fax number below belonging to the campus where the patient is receiving treatment. List of dedicated fax numbers for the Facilities:  FACILITY NAME UR FAX NUMBER   ADMISSION DENIALS (Administrative/Medical Necessity) [phone]   DISCHARGE SUPPORT TEAM ([facility]) [phone]   PARENT CHILD HEALTH (Maternity/NICU/Pediatrics) [phone]   [facility] [phone]   [facility] [phone]   [facility] [phone]   [facility] [phone]   [facility] [phone]   [facility] [phone]   [facility] [phone]   [facility]  [phone]   [facility] [phone]   [facility] [phone]   [facility] [phone]   [facility] [phone]

## 2024-08-22 ENCOUNTER — APPOINTMENT (EMERGENCY)
Dept: RADIOLOGY | Facility: HOSPITAL | Age: 64
End: 2024-08-22
Payer: COMMERCIAL

## 2024-08-22 ENCOUNTER — HOSPITAL ENCOUNTER (EMERGENCY)
Facility: HOSPITAL | Age: 64
Discharge: HOME | End: 2024-08-22
Attending: EMERGENCY MEDICINE
Payer: COMMERCIAL

## 2024-08-22 VITALS
OXYGEN SATURATION: 99 % | RESPIRATION RATE: 18 BRPM | TEMPERATURE: 98.5 F | SYSTOLIC BLOOD PRESSURE: 187 MMHG | DIASTOLIC BLOOD PRESSURE: 91 MMHG | WEIGHT: 234 LBS | HEART RATE: 75 BPM | BODY MASS INDEX: 32.76 KG/M2 | HEIGHT: 71 IN

## 2024-08-22 DIAGNOSIS — R53.1 WEAKNESS: Primary | ICD-10-CM

## 2024-08-22 LAB
ALBUMIN SERPL BCG-MCNC: 4 G/DL (ref 3.5–5)
ALP SERPL-CCNC: 63 U/L (ref 34–104)
ALT SERPL W P-5'-P-CCNC: 15 U/L (ref 7–52)
ANION GAP SERPL CALCULATED.3IONS-SCNC: 9 MMOL/L (ref 4–13)
AST SERPL W P-5'-P-CCNC: 23 U/L (ref 13–39)
BASOPHILS # BLD AUTO: 0.05 THOUSANDS/ÂΜL (ref 0–0.1)
BASOPHILS NFR BLD AUTO: 0 % (ref 0–1)
BILIRUB SERPL-MCNC: 0.44 MG/DL (ref 0.2–1)
BUN SERPL-MCNC: 14 MG/DL (ref 5–25)
CALCIUM SERPL-MCNC: 9.5 MG/DL (ref 8.4–10.2)
CARDIAC TROPONIN I PNL SERPL HS: 8 NG/L
CHLORIDE SERPL-SCNC: 98 MMOL/L (ref 96–108)
CK SERPL-CCNC: 255 U/L (ref 39–308)
CO2 SERPL-SCNC: 25 MMOL/L (ref 21–32)
CREAT SERPL-MCNC: 0.69 MG/DL (ref 0.6–1.3)
EOSINOPHIL # BLD AUTO: 0.11 THOUSAND/ÂΜL (ref 0–0.61)
EOSINOPHIL NFR BLD AUTO: 1 % (ref 0–6)
ERYTHROCYTE [DISTWIDTH] IN BLOOD BY AUTOMATED COUNT: 13.2 % (ref 11.6–15.1)
GFR SERPL CREATININE-BSD FRML MDRD: 100 ML/MIN/1.73SQ M
GLUCOSE SERPL-MCNC: 183 MG/DL (ref 65–140)
GLUCOSE SERPL-MCNC: 186 MG/DL (ref 65–140)
HCT VFR BLD AUTO: 41.2 % (ref 36.5–49.3)
HGB BLD-MCNC: 13.6 G/DL (ref 12–17)
IMM GRANULOCYTES # BLD AUTO: 0.05 THOUSAND/UL (ref 0–0.2)
IMM GRANULOCYTES NFR BLD AUTO: 0 % (ref 0–2)
LYMPHOCYTES # BLD AUTO: 15.13 THOUSANDS/ÂΜL (ref 0.6–4.47)
LYMPHOCYTES NFR BLD AUTO: 71 % (ref 14–44)
MCH RBC QN AUTO: 31.1 PG (ref 26.8–34.3)
MCHC RBC AUTO-ENTMCNC: 33 G/DL (ref 31.4–37.4)
MCV RBC AUTO: 94 FL (ref 82–98)
MONOCYTES # BLD AUTO: 0.63 THOUSAND/ÂΜL (ref 0.17–1.22)
MONOCYTES NFR BLD AUTO: 3 % (ref 4–12)
NEUTROPHILS # BLD AUTO: 5.41 THOUSANDS/ÂΜL (ref 1.85–7.62)
NEUTS SEG NFR BLD AUTO: 25 % (ref 43–75)
NRBC BLD AUTO-RTO: 0 /100 WBCS
PLATELET # BLD AUTO: 178 THOUSANDS/UL (ref 149–390)
PMV BLD AUTO: 9.3 FL (ref 8.9–12.7)
POTASSIUM SERPL-SCNC: 5 MMOL/L (ref 3.5–5.3)
PROT SERPL-MCNC: 6.9 G/DL (ref 6.4–8.4)
QRS AXIS: 64 DEGREES
QRSD INTERVAL: 86 MS
QT INTERVAL: 370 MS
QTC INTERVAL: 418 MS
RBC # BLD AUTO: 4.38 MILLION/UL (ref 3.88–5.62)
SODIUM SERPL-SCNC: 132 MMOL/L (ref 135–147)
T WAVE AXIS: 25 DEGREES
VENTRICULAR RATE: 77 BPM
WBC # BLD AUTO: 21.38 THOUSAND/UL (ref 4.31–10.16)

## 2024-08-22 PROCEDURE — 70450 CT HEAD/BRAIN W/O DYE: CPT

## 2024-08-22 PROCEDURE — 99284 EMERGENCY DEPT VISIT MOD MDM: CPT | Performed by: EMERGENCY MEDICINE

## 2024-08-22 PROCEDURE — 82550 ASSAY OF CK (CPK): CPT | Performed by: EMERGENCY MEDICINE

## 2024-08-22 PROCEDURE — 71045 X-RAY EXAM CHEST 1 VIEW: CPT

## 2024-08-22 PROCEDURE — 99285 EMERGENCY DEPT VISIT HI MDM: CPT

## 2024-08-22 PROCEDURE — 85025 COMPLETE CBC W/AUTO DIFF WBC: CPT | Performed by: EMERGENCY MEDICINE

## 2024-08-22 PROCEDURE — 82948 REAGENT STRIP/BLOOD GLUCOSE: CPT

## 2024-08-22 PROCEDURE — 80053 COMPREHEN METABOLIC PANEL: CPT | Performed by: EMERGENCY MEDICINE

## 2024-08-22 PROCEDURE — 93010 ELECTROCARDIOGRAM REPORT: CPT | Performed by: INTERNAL MEDICINE

## 2024-08-22 PROCEDURE — 36415 COLL VENOUS BLD VENIPUNCTURE: CPT | Performed by: EMERGENCY MEDICINE

## 2024-08-22 PROCEDURE — 93005 ELECTROCARDIOGRAM TRACING: CPT

## 2024-08-22 PROCEDURE — 84484 ASSAY OF TROPONIN QUANT: CPT | Performed by: EMERGENCY MEDICINE

## 2024-08-22 RX ORDER — ACETAMINOPHEN 10 MG/ML
1000 INJECTION, SOLUTION INTRAVENOUS ONCE
Status: DISCONTINUED | OUTPATIENT
Start: 2024-08-22 | End: 2024-08-22 | Stop reason: HOSPADM

## 2024-08-22 NOTE — ED NOTES
Pt OK with room change while waiting for transportation home. Transportation requested by Anil REID via roundtrip. Awaiting ETA. Pt requested pain medication, ginger ale, and turkey sandwich lunch box. Dr Fowler made aware of medication request and pt given food/drink.       Sheryl Vásquez RN  08/22/24 6313

## 2024-08-22 NOTE — ED NOTES
Pt refusing to wait for another ride, requesting to go without O2 home.      Raghu Rios RN  08/22/24 1444
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
## 2024-08-23 ENCOUNTER — IN HOME VISIT (OUTPATIENT)
Age: 64
End: 2024-08-23

## 2024-08-23 VITALS — TEMPERATURE: 98 F | OXYGEN SATURATION: 95 % | HEART RATE: 80 BPM | RESPIRATION RATE: 18 BRPM

## 2024-08-23 DIAGNOSIS — R33.9 URINARY RETENTION: ICD-10-CM

## 2024-08-23 DIAGNOSIS — R05.9 COUGH: ICD-10-CM

## 2024-08-23 LAB
BACTERIA BLD CULT: NORMAL
BACTERIA BLD CULT: NORMAL

## 2024-08-23 PROCEDURE — 99496 TRANSJ CARE MGMT HIGH F2F 7D: CPT | Performed by: NURSE PRACTITIONER

## 2024-08-23 RX ORDER — TAMSULOSIN HYDROCHLORIDE 0.4 MG/1
0.8 CAPSULE ORAL
Qty: 60 CAPSULE | Refills: 0 | Status: SHIPPED | OUTPATIENT
Start: 2024-08-23

## 2024-08-23 RX ORDER — BENZONATATE 100 MG/1
100 CAPSULE ORAL 3 TIMES DAILY PRN
Qty: 20 CAPSULE | Refills: 0 | Status: SHIPPED | OUTPATIENT
Start: 2024-08-23

## 2024-08-23 RX ORDER — GUAIFENESIN 600 MG/1
600 TABLET, EXTENDED RELEASE ORAL 2 TIMES DAILY PRN
Qty: 15 TABLET | Refills: 0 | Status: SHIPPED | OUTPATIENT
Start: 2024-08-23

## 2024-08-23 RX ORDER — PREDNISONE 20 MG/1
40 TABLET ORAL DAILY
Qty: 10 TABLET | Refills: 0 | Status: SHIPPED | OUTPATIENT
Start: 2024-08-23 | End: 2024-08-26

## 2024-08-23 NOTE — PROGRESS NOTES
Diabetic Foot Exam    Patient's shoes and socks removed.    Right Foot/Ankle   Right Foot Inspection  Skin Exam: skin normal and skin intact. No dry skin, no warmth, no callus, no erythema, no maceration, no abnormal color, no pre-ulcer, no ulcer and no callus.     Toe Exam: ROM and strength within normal limits.     Sensory   Monofilament testing: intact    Left Foot/Ankle  Left Foot Inspection  Skin Exam: skin normal and skin intact. No dry skin, no warmth, no erythema, no maceration, normal color, no pre-ulcer, no ulcer and no callus.     Toe Exam: ROM and strength within normal limits.     Sensory   Monofilament testing: intact    Assign Risk Category  No deformity present  No loss of protective sensation  No weak pulses  Risk: 0  Transition of Care Visit  Name: Alonzo Watson      : 1960      MRN: [id number]  Encounter Provider: MANAS Payne  Encounter Date: 2024   Encounter department: [facility]    Assessment & Plan   1. Cough  -     predniSONE 20 mg tablet; Take 2 tablets (40 mg total) by mouth daily for 5 days (Patient not taking: Reported on 2024)  -     guaiFENesin (MUCINEX) 600 mg 12 hr tablet; Take 1 tablet (600 mg total) by mouth 2 (two) times a day as needed for cough  -     benzonatate (TESSALON PERLES) 100 mg capsule; Take 1 capsule (100 mg total) by mouth 3 (three) times a day as needed for cough  2. Urinary retention  -     tamsulosin (FLOMAX) 0.4 mg; Take 2 capsules (0.8 mg total) by mouth daily with dinner         History of Present Illness     Transitional Care Management Review:   Alonzo Watson is a 63 y.o. male here for TCM follow up.     During the TCM phone call patient stated:  TCM Call       Date and time call was made  2024  9:40 AM    Hospital care reviewed  Records reviewed    Patient was hospitialized at  [facility]    Date of Admission  24    Date of discharge  24    Diagnosis  Leukocytosis     Disposition  Home    Were the patients medications reviewed and updated  Yes    Current Symptoms  None    Cough Severity  Mild    Shortness of breath severity  Mild    Chest pain severity  Mild    Chest pain onset  Ongoing    Back pain, left side, severity  Moderate    Fatigue severity  Mild          TCM Call       Post hospital issues  None    Should patient be enrolled in anticoag monitoring?  Yes    Scheduled for follow up?  Yes  Scheduled 8/23/24 for a home visit with Tuesday NP--TCM Visit    Not clinically warranted  Skilled Nursing Facility at [facility] [place]    Patients specialists  Urologist; Other (comment)    Cardiologist name  dr de la garza    Pulmonologist name  DR PETERSON    Urologist name  Aminta    Other specialists names  Hemetologist    Did you obtain your prescribed medications  Yes    Why were you unable to obtain your medications  HE WILL P/U TODAY    Do you need help managing your prescriptions or medications  No    Is transportation to your appointment needed  Yes    Specify why  Does not drive, friend will bring hime    I have advised the patient to call PCP with any new or worsening symptoms  Marlys holt/cma 6/27/22    Living Arrangements  Alone    Support System  [place]-based; Family; Friends    The type of support provided  Emotional    Do you have social support  Yes, as much as I need    Are you recieving any outpatient services  No    What type of services  [facility]    Are you recieving home care services  Yes    Types of home care services  Nurse visit; Home PT; Other (comment)    Comment  occupational therapy , home aide for personal    Are you using any community resources  No    Current waiver services  No    Have you fallen in the last 12 months  Yes    How many times  1 with out injuruy    Interperter language line needed  No    Counseling  Patient    Comments  Scheduled for a home visit with Tuesday NP 8/23/24          Patient seen at home for TCM s/p recent 2 ER visits and  hospitalization.  Patient is home bound.  He feels poorly today.  Has cough and wheezing and diaphoresis.  He asked to have his emergency button shut off because he says he is absolutely not suicidal but doesn't care anymore because he feels as if noone can help him.  I refused telling him that he needs to leave on and call for help if he continues to feel poorly.  Patient has an indwelling crocker catheter which is new to him.  Will treat symptoms today and educated him to return to hospital if his condition worsens.  >40 minutes spent with Alonzo on history, physical exam and assessment, planning and diagnosing and education.        Review of Systems   Constitutional:  Positive for diaphoresis.   HENT: Negative.     Eyes: Negative.    Respiratory:  Positive for cough and wheezing.    Cardiovascular: Negative.    Gastrointestinal: Negative.    Endocrine: Negative.    Genitourinary:         Indwelling crocker catheter with clear yellow urine   Musculoskeletal:  Positive for back pain, gait problem and myalgias.   Skin: Negative.    Allergic/Immunologic: Negative.    Neurological:  Positive for weakness.   Hematological: Negative.    Psychiatric/Behavioral:  Negative for suicidal ideas.      Objective     Pulse 80   Temp 98 °F (36.7 °C)   Resp 18   SpO2 95%     Physical Exam  Constitutional:       Appearance: He is obese. He is ill-appearing and diaphoretic.   HENT:      Head: Normocephalic and atraumatic.      Right Ear: External ear normal.      Left Ear: External ear normal.      Nose: Nose normal. No congestion.      Mouth/Throat:      Mouth: Mucous membranes are moist.   Eyes:      General:         Right eye: No discharge.         Left eye: No discharge.      Conjunctiva/sclera: Conjunctivae normal.   Cardiovascular:      Rate and Rhythm: Normal rate and regular rhythm.      Pulses: no weak pulses.      Heart sounds: Normal heart sounds.   Pulmonary:      Effort: Pulmonary effort is normal. No respiratory  distress.      Breath sounds: Wheezing present. No rhonchi or rales.   Abdominal:      General: Bowel sounds are normal.      Palpations: Abdomen is soft.      Tenderness: There is no abdominal tenderness. There is no guarding.   Musculoskeletal:         General: Normal range of motion.      Cervical back: Normal range of motion. No rigidity.      Right lower leg: No edema.      Left lower leg: No edema.   Feet:      Right foot:      Skin integrity: No ulcer, skin breakdown, erythema, warmth, callus or dry skin.      Left foot:      Skin integrity: No ulcer, skin breakdown, erythema, warmth, callus or dry skin.   Lymphadenopathy:      Cervical: No cervical adenopathy.   Skin:     General: Skin is warm.      Coloration: Skin is not jaundiced.      Findings: No bruising.   Neurological:      General: No focal deficit present.      Mental Status: He is alert and oriented to person, place, and time.      Motor: Weakness present.      Gait: Gait abnormal.   Psychiatric:         Mood and Affect: Mood normal.         Behavior: Behavior normal.         Thought Content: Thought content normal.         Judgment: Judgment normal.       Medications have been reviewed by provider in current encounter    Administrative Statements

## 2024-08-24 ENCOUNTER — APPOINTMENT (EMERGENCY)
Dept: RADIOLOGY | Facility: HOSPITAL | Age: 64
End: 2024-08-24
Payer: COMMERCIAL

## 2024-08-24 ENCOUNTER — HOSPITAL ENCOUNTER (OUTPATIENT)
Facility: HOSPITAL | Age: 64
Setting detail: OBSERVATION
Discharge: NURSING, REHAB OR LONG-TERM CARE | End: 2024-08-26
Attending: EMERGENCY MEDICINE | Admitting: INTERNAL MEDICINE
Payer: COMMERCIAL

## 2024-08-24 DIAGNOSIS — E87.1 HYPONATREMIA: ICD-10-CM

## 2024-08-24 DIAGNOSIS — R41.82 ALTERED MENTAL STATUS, UNSPECIFIED ALTERED MENTAL STATUS TYPE: Primary | ICD-10-CM

## 2024-08-24 DIAGNOSIS — E11.8 TYPE 2 DIABETES MELLITUS WITH COMPLICATION, WITH LONG-TERM CURRENT USE OF INSULIN (HCC): ICD-10-CM

## 2024-08-24 DIAGNOSIS — Z79.4 TYPE 2 DIABETES MELLITUS WITH COMPLICATION, WITH LONG-TERM CURRENT USE OF INSULIN (HCC): ICD-10-CM

## 2024-08-24 DIAGNOSIS — Z80.6 FAMILY HISTORY OF CLL (CHRONIC LYMPHOID LEUKEMIA): ICD-10-CM

## 2024-08-24 DIAGNOSIS — K59.00 CONSTIPATION, UNSPECIFIED CONSTIPATION TYPE: ICD-10-CM

## 2024-08-24 DIAGNOSIS — M54.42 CHRONIC BILATERAL LOW BACK PAIN WITH LEFT-SIDED SCIATICA: ICD-10-CM

## 2024-08-24 DIAGNOSIS — R29.90 STROKE-LIKE SYMPTOM: ICD-10-CM

## 2024-08-24 DIAGNOSIS — G89.29 CHRONIC BILATERAL LOW BACK PAIN WITH LEFT-SIDED SCIATICA: ICD-10-CM

## 2024-08-24 DIAGNOSIS — J18.9 COMMUNITY ACQUIRED PNEUMONIA: ICD-10-CM

## 2024-08-24 DIAGNOSIS — I25.10 ATHEROSCLEROSIS OF NATIVE CORONARY ARTERY WITHOUT ANGINA PECTORIS, UNSPECIFIED WHETHER NATIVE OR TRANSPLANTED HEART: ICD-10-CM

## 2024-08-24 PROBLEM — I63.9 STROKE (CEREBRUM) (HCC): Status: ACTIVE | Noted: 2024-08-24

## 2024-08-24 LAB
2HR DELTA HS TROPONIN: 1 NG/L
ALBUMIN SERPL BCG-MCNC: 3.9 G/DL (ref 3.5–5)
ALP SERPL-CCNC: 66 U/L (ref 34–104)
ALT SERPL W P-5'-P-CCNC: 14 U/L (ref 7–52)
ANION GAP SERPL CALCULATED.3IONS-SCNC: 10 MMOL/L (ref 4–13)
ANION GAP SERPL CALCULATED.3IONS-SCNC: 9 MMOL/L (ref 4–13)
APTT PPP: 27 SECONDS (ref 23–34)
AST SERPL W P-5'-P-CCNC: 18 U/L (ref 13–39)
ATRIAL RATE: 72 BPM
BACTERIA UR QL AUTO: ABNORMAL /HPF
BILIRUB SERPL-MCNC: 0.44 MG/DL (ref 0.2–1)
BILIRUB UR QL STRIP: NEGATIVE
BUN SERPL-MCNC: 12 MG/DL (ref 5–25)
BUN SERPL-MCNC: 13 MG/DL (ref 5–25)
CALCIUM SERPL-MCNC: 8.6 MG/DL (ref 8.4–10.2)
CALCIUM SERPL-MCNC: 9.9 MG/DL (ref 8.4–10.2)
CARDIAC TROPONIN I PNL SERPL HS: 17 NG/L
CARDIAC TROPONIN I PNL SERPL HS: 18 NG/L
CHLORIDE SERPL-SCNC: 89 MMOL/L (ref 96–108)
CHLORIDE SERPL-SCNC: 89 MMOL/L (ref 96–108)
CHOLEST SERPL-MCNC: 157 MG/DL
CLARITY UR: CLEAR
CO2 SERPL-SCNC: 28 MMOL/L (ref 21–32)
CO2 SERPL-SCNC: 29 MMOL/L (ref 21–32)
COLOR UR: COLORLESS
CREAT SERPL-MCNC: 0.66 MG/DL (ref 0.6–1.3)
CREAT SERPL-MCNC: 0.74 MG/DL (ref 0.6–1.3)
CREAT UR-MCNC: 34.9 MG/DL
DIGOXIN SERPL-MCNC: <0.3 NG/ML (ref 0.8–2)
ERYTHROCYTE [DISTWIDTH] IN BLOOD BY AUTOMATED COUNT: 13 % (ref 11.6–15.1)
EST. AVERAGE GLUCOSE BLD GHB EST-MCNC: 252 MG/DL
FLUAV RNA RESP QL NAA+PROBE: NEGATIVE
FLUBV RNA RESP QL NAA+PROBE: NEGATIVE
GFR SERPL CREATININE-BSD FRML MDRD: 102 ML/MIN/1.73SQ M
GFR SERPL CREATININE-BSD FRML MDRD: 97 ML/MIN/1.73SQ M
GLUCOSE SERPL-MCNC: 117 MG/DL (ref 65–140)
GLUCOSE SERPL-MCNC: 157 MG/DL (ref 65–140)
GLUCOSE SERPL-MCNC: 203 MG/DL (ref 65–140)
GLUCOSE SERPL-MCNC: 215 MG/DL (ref 65–140)
GLUCOSE SERPL-MCNC: 233 MG/DL (ref 65–140)
GLUCOSE UR STRIP-MCNC: ABNORMAL MG/DL
HBA1C MFR BLD: 10.4 %
HCT VFR BLD AUTO: 41.9 % (ref 36.5–49.3)
HDLC SERPL-MCNC: 65 MG/DL
HGB BLD-MCNC: 14 G/DL (ref 12–17)
HGB UR QL STRIP.AUTO: ABNORMAL
INR PPP: 0.9 (ref 0.85–1.19)
KETONES UR STRIP-MCNC: NEGATIVE MG/DL
LACTATE SERPL-SCNC: 1.5 MMOL/L (ref 0.5–2)
LACTATE SERPL-SCNC: 2.4 MMOL/L (ref 0.5–2)
LACTATE SERPL-SCNC: 2.7 MMOL/L (ref 0.5–2)
LDLC SERPL CALC-MCNC: 54 MG/DL (ref 0–100)
LEUKOCYTE ESTERASE UR QL STRIP: NEGATIVE
MCH RBC QN AUTO: 30.9 PG (ref 26.8–34.3)
MCHC RBC AUTO-ENTMCNC: 33.4 G/DL (ref 31.4–37.4)
MCV RBC AUTO: 93 FL (ref 82–98)
NITRITE UR QL STRIP: NEGATIVE
NON-SQ EPI CELLS URNS QL MICRO: ABNORMAL /HPF
OSMOLALITY UR/SERPL-RTO: 284 MMOL/KG (ref 282–298)
OSMOLALITY UR: 351 MMOL/KG
PH UR STRIP.AUTO: 7 [PH]
PLATELET # BLD AUTO: 229 THOUSANDS/UL (ref 149–390)
PMV BLD AUTO: 9.1 FL (ref 8.9–12.7)
POTASSIUM SERPL-SCNC: 4.1 MMOL/L (ref 3.5–5.3)
POTASSIUM SERPL-SCNC: 4.3 MMOL/L (ref 3.5–5.3)
PROCALCITONIN SERPL-MCNC: <0.05 NG/ML
PROT SERPL-MCNC: 6.4 G/DL (ref 6.4–8.4)
PROT UR STRIP-MCNC: NEGATIVE MG/DL
PROTHROMBIN TIME: 12.7 SECONDS (ref 12.3–15)
QRS AXIS: 88 DEGREES
QRSD INTERVAL: 98 MS
QT INTERVAL: 390 MS
QTC INTERVAL: 438 MS
RBC # BLD AUTO: 4.53 MILLION/UL (ref 3.88–5.62)
RBC #/AREA URNS AUTO: ABNORMAL /HPF
RSV RNA RESP QL NAA+PROBE: NEGATIVE
SARS-COV-2 RNA RESP QL NAA+PROBE: NEGATIVE
SODIUM 24H UR-SCNC: 95 MOL/L
SODIUM SERPL-SCNC: 126 MMOL/L (ref 135–147)
SODIUM SERPL-SCNC: 128 MMOL/L (ref 135–147)
SP GR UR STRIP.AUTO: <1.005 (ref 1–1.03)
T WAVE AXIS: 54 DEGREES
TRIGL SERPL-MCNC: 190 MG/DL
TSH SERPL DL<=0.05 MIU/L-ACNC: 2.56 UIU/ML (ref 0.45–4.5)
URATE SERPL-MCNC: 4 MG/DL (ref 3.5–8.5)
UROBILINOGEN UR STRIP-ACNC: <2 MG/DL
UUN 24H UR-MCNC: 209 MG/DL
VENTRICULAR RATE: 76 BPM
WBC # BLD AUTO: 32.11 THOUSAND/UL (ref 4.31–10.16)
WBC #/AREA URNS AUTO: ABNORMAL /HPF

## 2024-08-24 PROCEDURE — 82948 REAGENT STRIP/BLOOD GLUCOSE: CPT

## 2024-08-24 PROCEDURE — 94760 N-INVAS EAR/PLS OXIMETRY 1: CPT

## 2024-08-24 PROCEDURE — 85027 COMPLETE CBC AUTOMATED: CPT

## 2024-08-24 PROCEDURE — 85027 COMPLETE CBC AUTOMATED: CPT | Performed by: EMERGENCY MEDICINE

## 2024-08-24 PROCEDURE — 85007 BL SMEAR W/DIFF WBC COUNT: CPT

## 2024-08-24 PROCEDURE — 84484 ASSAY OF TROPONIN QUANT: CPT | Performed by: EMERGENCY MEDICINE

## 2024-08-24 PROCEDURE — 81001 URINALYSIS AUTO W/SCOPE: CPT | Performed by: EMERGENCY MEDICINE

## 2024-08-24 PROCEDURE — 94660 CPAP INITIATION&MGMT: CPT

## 2024-08-24 PROCEDURE — 84145 PROCALCITONIN (PCT): CPT | Performed by: EMERGENCY MEDICINE

## 2024-08-24 PROCEDURE — 84300 ASSAY OF URINE SODIUM: CPT

## 2024-08-24 PROCEDURE — 82570 ASSAY OF URINE CREATININE: CPT

## 2024-08-24 PROCEDURE — 96365 THER/PROPH/DIAG IV INF INIT: CPT

## 2024-08-24 PROCEDURE — 99285 EMERGENCY DEPT VISIT HI MDM: CPT

## 2024-08-24 PROCEDURE — 83930 ASSAY OF BLOOD OSMOLALITY: CPT

## 2024-08-24 PROCEDURE — 99223 1ST HOSP IP/OBS HIGH 75: CPT | Performed by: INTERNAL MEDICINE

## 2024-08-24 PROCEDURE — 0241U HB NFCT DS VIR RESP RNA 4 TRGT: CPT | Performed by: EMERGENCY MEDICINE

## 2024-08-24 PROCEDURE — 84443 ASSAY THYROID STIM HORMONE: CPT

## 2024-08-24 PROCEDURE — 84550 ASSAY OF BLOOD/URIC ACID: CPT

## 2024-08-24 PROCEDURE — 85610 PROTHROMBIN TIME: CPT | Performed by: EMERGENCY MEDICINE

## 2024-08-24 PROCEDURE — 84540 ASSAY OF URINE/UREA-N: CPT

## 2024-08-24 PROCEDURE — 87081 CULTURE SCREEN ONLY: CPT | Performed by: INTERNAL MEDICINE

## 2024-08-24 PROCEDURE — 99285 EMERGENCY DEPT VISIT HI MDM: CPT | Performed by: EMERGENCY MEDICINE

## 2024-08-24 PROCEDURE — 83935 ASSAY OF URINE OSMOLALITY: CPT

## 2024-08-24 PROCEDURE — 70498 CT ANGIOGRAPHY NECK: CPT

## 2024-08-24 PROCEDURE — 70496 CT ANGIOGRAPHY HEAD: CPT

## 2024-08-24 PROCEDURE — 83036 HEMOGLOBIN GLYCOSYLATED A1C: CPT

## 2024-08-24 PROCEDURE — 80162 ASSAY OF DIGOXIN TOTAL: CPT

## 2024-08-24 PROCEDURE — 36415 COLL VENOUS BLD VENIPUNCTURE: CPT | Performed by: EMERGENCY MEDICINE

## 2024-08-24 PROCEDURE — 80048 BASIC METABOLIC PNL TOTAL CA: CPT

## 2024-08-24 PROCEDURE — 80048 BASIC METABOLIC PNL TOTAL CA: CPT | Performed by: EMERGENCY MEDICINE

## 2024-08-24 PROCEDURE — 83605 ASSAY OF LACTIC ACID: CPT

## 2024-08-24 PROCEDURE — 96375 TX/PRO/DX INJ NEW DRUG ADDON: CPT

## 2024-08-24 PROCEDURE — 83605 ASSAY OF LACTIC ACID: CPT | Performed by: EMERGENCY MEDICINE

## 2024-08-24 PROCEDURE — 80053 COMPREHEN METABOLIC PANEL: CPT | Performed by: EMERGENCY MEDICINE

## 2024-08-24 PROCEDURE — 99215 OFFICE O/P EST HI 40 MIN: CPT | Performed by: PSYCHIATRY & NEUROLOGY

## 2024-08-24 PROCEDURE — 93005 ELECTROCARDIOGRAM TRACING: CPT

## 2024-08-24 PROCEDURE — 71275 CT ANGIOGRAPHY CHEST: CPT

## 2024-08-24 PROCEDURE — 87040 BLOOD CULTURE FOR BACTERIA: CPT | Performed by: EMERGENCY MEDICINE

## 2024-08-24 PROCEDURE — 85730 THROMBOPLASTIN TIME PARTIAL: CPT | Performed by: EMERGENCY MEDICINE

## 2024-08-24 PROCEDURE — 94640 AIRWAY INHALATION TREATMENT: CPT

## 2024-08-24 PROCEDURE — 80061 LIPID PANEL: CPT

## 2024-08-24 PROCEDURE — 74174 CTA ABD&PLVS W/CONTRAST: CPT

## 2024-08-24 RX ORDER — OXYCODONE HYDROCHLORIDE 5 MG/1
5 TABLET ORAL EVERY 6 HOURS PRN
Status: DISCONTINUED | OUTPATIENT
Start: 2024-08-24 | End: 2024-08-25

## 2024-08-24 RX ORDER — HYDROMORPHONE HCL/PF 1 MG/ML
0.5 SYRINGE (ML) INJECTION ONCE
Status: COMPLETED | OUTPATIENT
Start: 2024-08-24 | End: 2024-08-24

## 2024-08-24 RX ORDER — METOPROLOL SUCCINATE 100 MG/1
200 TABLET, EXTENDED RELEASE ORAL DAILY
Status: DISCONTINUED | OUTPATIENT
Start: 2024-08-24 | End: 2024-08-26 | Stop reason: HOSPADM

## 2024-08-24 RX ORDER — ASPIRIN 325 MG
325 TABLET ORAL ONCE
Status: DISCONTINUED | OUTPATIENT
Start: 2024-08-24 | End: 2024-08-24

## 2024-08-24 RX ORDER — FLUTICASONE FUROATE AND VILANTEROL 100; 25 UG/1; UG/1
1 POWDER RESPIRATORY (INHALATION)
Status: DISCONTINUED | OUTPATIENT
Start: 2024-08-24 | End: 2024-08-26 | Stop reason: HOSPADM

## 2024-08-24 RX ORDER — OXYCODONE HYDROCHLORIDE 5 MG/1
5 TABLET ORAL EVERY 4 HOURS PRN
Status: DISCONTINUED | OUTPATIENT
Start: 2024-08-24 | End: 2024-08-24

## 2024-08-24 RX ORDER — SODIUM CHLORIDE 1 G/1
1 TABLET ORAL
Status: DISCONTINUED | OUTPATIENT
Start: 2024-08-24 | End: 2024-08-25

## 2024-08-24 RX ORDER — INSULIN GLARGINE 100 [IU]/ML
50 INJECTION, SOLUTION SUBCUTANEOUS EVERY 12 HOURS SCHEDULED
Status: DISCONTINUED | OUTPATIENT
Start: 2024-08-24 | End: 2024-08-26 | Stop reason: HOSPADM

## 2024-08-24 RX ORDER — INSULIN LISPRO 100 [IU]/ML
1-6 INJECTION, SOLUTION INTRAVENOUS; SUBCUTANEOUS
Status: DISCONTINUED | OUTPATIENT
Start: 2024-08-24 | End: 2024-08-26 | Stop reason: HOSPADM

## 2024-08-24 RX ORDER — SODIUM CHLORIDE 9 MG/ML
75 INJECTION, SOLUTION INTRAVENOUS CONTINUOUS
Status: DISCONTINUED | OUTPATIENT
Start: 2024-08-24 | End: 2024-08-25

## 2024-08-24 RX ORDER — QUETIAPINE FUMARATE 100 MG/1
300 TABLET, FILM COATED ORAL
Status: DISCONTINUED | OUTPATIENT
Start: 2024-08-24 | End: 2024-08-26 | Stop reason: HOSPADM

## 2024-08-24 RX ORDER — IPRATROPIUM BROMIDE AND ALBUTEROL SULFATE 2.5; .5 MG/3ML; MG/3ML
3 SOLUTION RESPIRATORY (INHALATION) EVERY 6 HOURS PRN
Status: DISCONTINUED | OUTPATIENT
Start: 2024-08-24 | End: 2024-08-26

## 2024-08-24 RX ORDER — GABAPENTIN 400 MG/1
800 CAPSULE ORAL 4 TIMES DAILY
Status: DISCONTINUED | OUTPATIENT
Start: 2024-08-24 | End: 2024-08-26 | Stop reason: HOSPADM

## 2024-08-24 RX ORDER — LAMOTRIGINE 25 MG/1
25 TABLET ORAL
Status: DISCONTINUED | OUTPATIENT
Start: 2024-08-24 | End: 2024-08-26 | Stop reason: HOSPADM

## 2024-08-24 RX ORDER — DIGOXIN 125 MCG
250 TABLET ORAL DAILY
Status: DISCONTINUED | OUTPATIENT
Start: 2024-08-24 | End: 2024-08-26 | Stop reason: HOSPADM

## 2024-08-24 RX ORDER — INSULIN LISPRO 100 [IU]/ML
15 INJECTION, SOLUTION INTRAVENOUS; SUBCUTANEOUS
Status: DISCONTINUED | OUTPATIENT
Start: 2024-08-24 | End: 2024-08-26 | Stop reason: HOSPADM

## 2024-08-24 RX ORDER — OXYCODONE HYDROCHLORIDE 10 MG/1
10 TABLET ORAL EVERY 6 HOURS PRN
Status: DISCONTINUED | OUTPATIENT
Start: 2024-08-24 | End: 2024-08-25

## 2024-08-24 RX ORDER — ATORVASTATIN CALCIUM 40 MG/1
40 TABLET, FILM COATED ORAL EVERY EVENING
Status: DISCONTINUED | OUTPATIENT
Start: 2024-08-24 | End: 2024-08-26

## 2024-08-24 RX ORDER — LIDOCAINE 50 MG/G
2 PATCH TOPICAL DAILY
Status: DISCONTINUED | OUTPATIENT
Start: 2024-08-24 | End: 2024-08-26 | Stop reason: HOSPADM

## 2024-08-24 RX ORDER — ACETAMINOPHEN 325 MG/1
650 TABLET ORAL EVERY 6 HOURS PRN
Status: DISCONTINUED | OUTPATIENT
Start: 2024-08-24 | End: 2024-08-26 | Stop reason: HOSPADM

## 2024-08-24 RX ORDER — BUSPIRONE HYDROCHLORIDE 10 MG/1
10 TABLET ORAL 2 TIMES DAILY
Status: DISCONTINUED | OUTPATIENT
Start: 2024-08-24 | End: 2024-08-26 | Stop reason: HOSPADM

## 2024-08-24 RX ORDER — ACETAMINOPHEN 325 MG/1
975 TABLET ORAL EVERY 8 HOURS SCHEDULED
Status: DISCONTINUED | OUTPATIENT
Start: 2024-08-24 | End: 2024-08-26 | Stop reason: HOSPADM

## 2024-08-24 RX ORDER — LOSARTAN POTASSIUM 50 MG/1
50 TABLET ORAL DAILY
Status: DISCONTINUED | OUTPATIENT
Start: 2024-08-24 | End: 2024-08-26 | Stop reason: HOSPADM

## 2024-08-24 RX ORDER — TAMSULOSIN HYDROCHLORIDE 0.4 MG/1
0.8 CAPSULE ORAL
Status: DISCONTINUED | OUTPATIENT
Start: 2024-08-24 | End: 2024-08-26 | Stop reason: HOSPADM

## 2024-08-24 RX ORDER — ASPIRIN 81 MG/1
81 TABLET, CHEWABLE ORAL DAILY
Status: DISCONTINUED | OUTPATIENT
Start: 2024-08-25 | End: 2024-08-26 | Stop reason: HOSPADM

## 2024-08-24 RX ORDER — OXYCODONE HYDROCHLORIDE 10 MG/1
10 TABLET ORAL EVERY 4 HOURS PRN
Status: DISCONTINUED | OUTPATIENT
Start: 2024-08-24 | End: 2024-08-24

## 2024-08-24 RX ADMIN — LAMOTRIGINE 25 MG: 25 TABLET ORAL at 21:30

## 2024-08-24 RX ADMIN — INSULIN GLARGINE 50 UNITS: 100 INJECTION, SOLUTION SUBCUTANEOUS at 21:32

## 2024-08-24 RX ADMIN — QUETIAPINE FUMARATE 300 MG: 100 TABLET ORAL at 21:30

## 2024-08-24 RX ADMIN — SERTRALINE HYDROCHLORIDE 50 MG: 50 TABLET ORAL at 21:31

## 2024-08-24 RX ADMIN — TAMSULOSIN HYDROCHLORIDE 0.8 MG: 0.4 CAPSULE ORAL at 17:07

## 2024-08-24 RX ADMIN — HYDROMORPHONE HYDROCHLORIDE 0.5 MG: 1 INJECTION, SOLUTION INTRAMUSCULAR; INTRAVENOUS; SUBCUTANEOUS at 12:52

## 2024-08-24 RX ADMIN — SODIUM CHLORIDE 100 ML/HR: 0.9 INJECTION, SOLUTION INTRAVENOUS at 17:09

## 2024-08-24 RX ADMIN — APIXABAN 5 MG: 5 TABLET, FILM COATED ORAL at 17:08

## 2024-08-24 RX ADMIN — UMECLIDINIUM 1 PUFF: 62.5 AEROSOL, POWDER ORAL at 17:28

## 2024-08-24 RX ADMIN — IPRATROPIUM BROMIDE AND ALBUTEROL SULFATE 3 ML: .5; 3 SOLUTION RESPIRATORY (INHALATION) at 21:48

## 2024-08-24 RX ADMIN — OXYCODONE HYDROCHLORIDE 10 MG: 10 TABLET ORAL at 17:08

## 2024-08-24 RX ADMIN — DIGOXIN 250 MCG: 125 TABLET ORAL at 17:14

## 2024-08-24 RX ADMIN — ACETAMINOPHEN 975 MG: 325 TABLET ORAL at 17:08

## 2024-08-24 RX ADMIN — GABAPENTIN 800 MG: 400 CAPSULE ORAL at 17:07

## 2024-08-24 RX ADMIN — AMPICILLIN SODIUM AND SULBACTAM SODIUM 3 G: 2; 1 INJECTION, POWDER, FOR SOLUTION INTRAMUSCULAR; INTRAVENOUS at 12:25

## 2024-08-24 RX ADMIN — ATORVASTATIN CALCIUM 40 MG: 40 TABLET, FILM COATED ORAL at 17:08

## 2024-08-24 RX ADMIN — LOSARTAN POTASSIUM 50 MG: 50 TABLET, FILM COATED ORAL at 17:14

## 2024-08-24 RX ADMIN — IOHEXOL 85 ML: 350 INJECTION, SOLUTION INTRAVENOUS at 12:02

## 2024-08-24 RX ADMIN — OXYCODONE HYDROCHLORIDE 5 MG: 5 TABLET ORAL at 21:31

## 2024-08-24 RX ADMIN — GABAPENTIN 800 MG: 400 CAPSULE ORAL at 21:30

## 2024-08-24 RX ADMIN — BUSPIRONE HYDROCHLORIDE 10 MG: 10 TABLET ORAL at 17:07

## 2024-08-24 RX ADMIN — METOPROLOL SUCCINATE 200 MG: 100 TABLET, EXTENDED RELEASE ORAL at 17:14

## 2024-08-24 RX ADMIN — FLUTICASONE FUROATE AND VILANTEROL TRIFENATATE 1 PUFF: 100; 25 POWDER RESPIRATORY (INHALATION) at 17:28

## 2024-08-24 RX ADMIN — Medication 1 G: at 17:08

## 2024-08-24 RX ADMIN — LIDOCAINE 2 PATCH: 50 PATCH CUTANEOUS at 17:15

## 2024-08-24 RX ADMIN — IOHEXOL 85 ML: 350 INJECTION, SOLUTION INTRAVENOUS at 12:00
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
## 2024-08-24 NOTE — ASSESSMENT & PLAN NOTE
Acute on Chronic, baseline 134-135 corrected.  POA Na 126 - corrected 128.  Sodium 1g daily at home    Continue salt tablet as 1g TID

## 2024-08-24 NOTE — ED PROVIDER NOTES
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 Device                            2.0-3.0  Mechanical Heart Valve                                  -    Aortic(with afib, MI, embolism, HF, LA enlargement,    and/or coagulopathy)                                     2.0-3.0 (2.5-3.5)     Mitral                                                             2.5-3.5  Prosthetic/Bioprosthetic Heart Valve               2.0-3.0  Venous thromboembolism (VTE: VT, PE        2.0-3.0    APTT [[id number]]  (Normal) Collected: 08/24/24 1150    Lab Status: Final result Specimen: Blood from Arm, Right Updated: 08/24/24 1218     PTT 27 seconds     CBC and Platelet [[id number]]  (Abnormal) Collected: 08/24/24 1150    Lab Status: Final result Specimen: Blood from Arm, Right Updated: 08/24/24 1156     WBC 32.11 Thousand/uL      RBC 4.53 Million/uL      Hemoglobin 14.0 g/dL      Hematocrit 41.9 %      MCV 93 fL      MCH 30.9 pg      MCHC 33.4 g/dL      RDW 13.0 %      Platelets 229 Thousands/uL      MPV 9.1 fL     Fingerstick Glucose (POCT) [[id number]]  (Abnormal) Collected: 08/24/24 1143    Lab Status: Final result Specimen: Blood Updated: 08/24/24 1144     POC Glucose 233 mg/dl                    CTA dissection protocol chest abdomen pelvis w wo contrast   Final Result by Mihir Rincon MD (08/24 1329)      No acute findings in the chest,  abdomen or pelvis.      Specifically, no acute aortic/arterial abnormality.         Workstation performed: [id number]         CTA stroke alert (head/neck)   Final Result by Pallav N Shah, MD (08/24 1211)      No large vessel occlusion.      Mild carotid atherosclerotic disease.      No hemodynamically significant stenosis of the carotid bifurcations         Findings were directly discussed with Shawn Smith at 12:11 p.m.      Workstation performed: [id number]         CT stroke alert brain   Final Result by Pallav N Shah, MD (08/24 1217)      No acute intracranial abnormality.      Findings were directly discussed with Shawn Smith at approximately 12:04 p.m.      Workstation performed: [id number]                    Procedures  Procedures         ED Course                                               Medical Decision Making  Amount and/or Complexity of Data Reviewed  Labs: ordered.  Radiology: ordered.    Risk  Prescription drug management.  Decision regarding hospitalization.                 Disposition  Final diagnoses:   Altered mental status, unspecified altered mental status type   Family history of CLL (chronic lymphoid leukemia)   Hyponatremia     Time reflects when diagnosis was documented in both MDM as applicable and the Disposition within this note       Time User Action Codes Description Comment    8/24/2024 11:44 AM Steven Lopez Add [I63.9] Stroke (cerebrum) ([facility])     8/24/2024  2:22 PM Steven Lopez Add [R41.82] Altered mental status, unspecified altered mental status type     8/24/2024  2:40 PM Steven Lopez Modify [I63.9] hyponatremia     8/24/2024  2:41 PM Steven Lopez Modify [I63.9] Stroke (cerebrum) ([facility])     8/24/2024  2:42 PM Steven Lopez Add [Z80.6] Family history of CLL (chronic lymphoid leukemia)     8/24/2024  2:42 PM Steven Lopez Add [E87.1] Hyponatremia     8/24/2024  2:43 PM Steven Lopez Modify [R41.82] Altered mental status, unspecified altered mental  status type     8/24/2024  2:43 PM Steven Lopez Remove [I63.9] Stroke (cerebrum) ([facility])     8/24/2024  2:57 PM Amanda Gonzalesmars Add [R29.90] Stroke-like symptom           ED Disposition       ED Disposition   Admit    Condition   Stable    Date/Time   Sat Aug 24, 2024  2:22 PM    Comment   Case was discussed with jamshid and the patient's admission status was agreed to be Admission Status: observation status to the service of Dr. Tran .               Follow-up Information    None         Current Discharge Medication List        CONTINUE these medications which have NOT CHANGED    Details   acetaminophen (TYLENOL) 325 mg tablet Take 2 tablets (650 mg total) by mouth every 6 (six) hours as needed for mild pain, headaches or fever  Qty: 30 tablet, Refills: 0    Comments: Available over the counter  Associated Diagnoses: Community acquired pneumonia      digoxin (LANOXIN) 0.25 mg tablet TAKE 1 TABLET DAILY  Qty: 90 tablet, Refills: 1    Associated Diagnoses: Atrial fibrillation with RVR ([facility])      fluticasone-umeclidinium-vilanterol (Trelegy Ellipta) 100-62.5-25 mcg/actuation inhaler Inhale 1 puff daily Rinse mouth after use.  Qty: 28 each, Refills: 3    Associated Diagnoses: Centrilobular emphysema ([facility])      gabapentin (NEURONTIN) 800 mg tablet TAKE 1 TABLET BY MOUTH 4 TIMES A DAY  Qty: 120 tablet, Refills: 3    Associated Diagnoses: Peripheral polyneuropathy      Insulin Glargine Solostar (Lantus SoloStar) 100 UNIT/ML SOPN Inject 0.5 mL (50 Units total) under the skin 2 (two) times a day  Qty: 50 mL, Refills: 0    Associated Diagnoses: Uncontrolled type 2 diabetes mellitus with hyperglycemia ([facility])      insuln lispro (HumaLOG KwikPen) 200 units/mL CONCENTRATED U-200 injection pen Inject 15 Units under the skin 3 (three) times a day with meals  Qty: 6 mL, Refills: 0    Associated Diagnoses: Type 2 diabetes mellitus with complication, with long-term current use of insulin ([facility])      ipratropium-albuterol (DUO-NEB)  0.5-2.5 mg/3 mL nebulizer solution Take 3 mL by nebulization every 6 (six) hours as needed for wheezing or shortness of breath  Qty: 60 mL, Refills: 0    Associated Diagnoses: Chronic bronchitis, unspecified chronic bronchitis type (HCC)      lamoTRIgine (LaMICtal) 25 mg tablet 25 mg daily at bedtime      metoprolol succinate (TOPROL-XL) 200 MG 24 hr tablet Take 1 tablet (200 mg total) by mouth daily  Qty: 90 tablet, Refills: 3    Associated Diagnoses: Coronary artery disease involving native heart without angina pectoris, unspecified vessel or lesion type      oxyCODONE-acetaminophen (Percocet) 5-325 mg per tablet Take 2 tablets by mouth every 8 (eight) hours as needed for moderate pain Max Daily Amount: 6 tablets  Qty: 180 tablet, Refills: 0    Associated Diagnoses: Chronic intractable pain; Foraminal stenosis of lumbar region; Other intervertebral disc degeneration, lumbar region      QUEtiapine (SEROquel) 300 mg tablet Take 300 mg by mouth daily at bedtime      sertraline (ZOLOFT) 50 mg tablet Take 1 tablet (50 mg total) by mouth daily Daily at bedtime  Qty: 30 tablet, Refills: 0    Associated Diagnoses: Psychiatric disorder      sodium chloride 1 g tablet Take 1 tablet (1 g total) by mouth every morning  Qty: 30 tablet, Refills: 3    Associated Diagnoses: Hyponatremia      spironolactone (ALDACTONE) 25 mg tablet TAKE 1 TABLET BY MOUTH DAILY  Qty: 90 tablet, Refills: 1    Associated Diagnoses: Chronic diastolic (congestive) heart failure (HCC)      tamsulosin (FLOMAX) 0.4 mg Take 2 capsules (0.8 mg total) by mouth daily with dinner  Qty: 60 capsule, Refills: 0    Associated Diagnoses: Urinary retention      albuterol (2.5 mg/3 mL) 0.083 % nebulizer solution USE 1 VIAL (2.5 MG TOTAL) BY NEBULIZATION EVERY 6 HOURS AS NEEDED FOR WHEEZING  Qty: 375 mL, Refills: 5    Associated Diagnoses: Chronic obstructive pulmonary disease, unspecified COPD type ([facility])      Alcohol Swabs (Alcohol Prep) 70 % PADS Apply topically 4  (four) times a day As directed  Qty: 100 each, Refills: 0    Associated Diagnoses: Type 2 diabetes mellitus with hyperglycemia, with long-term current use of insulin ([facility])      apixaban (Eliquis) 5 mg Take 1 tablet (5 mg total) by mouth 2 (two) times a day  Qty: 180 tablet, Refills: 3    Associated Diagnoses: Atrial fibrillation with RVR ([facility])      !! B-D ULTRAFINE III SHORT PEN 31G X 8 MM MISC Use as directed      benzonatate (TESSALON PERLES) 100 mg capsule Take 1 capsule (100 mg total) by mouth 3 (three) times a day as needed for cough  Qty: 20 capsule, Refills: 0    Associated Diagnoses: Cough      bumetanide (BUMEX) 1 mg tablet Take 1 tablet (1 mg total) by mouth daily  Qty: 30 tablet, Refills: 3    Associated Diagnoses: Chronic congestive heart failure, unspecified heart failure type ([facility])      busPIRone (BUSPAR) 10 mg tablet TAKE ONE TABLET BY MOUTH TWICE DAILY  Qty: 60 tablet, Refills: 0    Associated Diagnoses: Psychiatric disorder      !! [place] Choice Comfort EZ 33G X 4 MM MISC USE TO INJECT INSULIN 5 TIMES A DAY      Continuous Glucose  (FreeStyle [place]) BHARAT Check blood sugars multiple times per day  Qty: 1 each, Refills: 0    Associated Diagnoses: Type 2 diabetes mellitus with complication, with long-term current use of insulin ([facility])      Continuous Glucose Sensor (FreeStyle [place] 2 Sensor) MISC CHECK BLOOD SUGARS MULTIPLE TIMES DAILY  Qty: 6 each, Refills: 0    Associated Diagnoses: Type 2 diabetes mellitus with complication, with long-term current use of insulin ([facility])      docusate sodium (COLACE) 100 mg capsule Take 1 capsule (100 mg total) by mouth 2 (two) times a day  Refills: 0    Associated Diagnoses: Constipation      guaiFENesin (MUCINEX) 600 mg 12 hr tablet Take 1 tablet (600 mg total) by mouth 2 (two) times a day as needed for cough  Qty: 15 tablet, Refills: 0    Associated Diagnoses: Cough      !! Insulin Pen Needle ([place] Choice Comfort EZ) 33G X 4 MM MISC USE TO INJECT  INSULIN 5 TIMES A DAY  Qty: 500 each, Refills: 1    Associated Diagnoses: Type 2 diabetes mellitus with complication, with long-term current use of insulin ([facility])      !! Insulin Pen Needle 31G X 5 MM MISC Inject under the skin 4 (four) times a day  Qty: 120 each, Refills: 6    Associated Diagnoses: Type 2 diabetes mellitus with complication, with long-term current use of insulin ([facility])      lidocaine (LIDODERM) 5 % Apply 2 patches topically over 12 hours daily Remove & Discard patch within 12 hours or as directed by MD  Qty: 60 patch, Refills: 0    Associated Diagnoses: Muscle weakness (generalized)      losartan (COZAAR) 50 mg tablet Take 1 tablet (50 mg total) by mouth daily  Qty: 90 tablet, Refills: 3    Associated Diagnoses: Chronic systolic heart failure ([facility])      Multiple Vitamins-Minerals (CENTRUM SILVER 50+MEN PO) Take by mouth      naloxone (NARCAN) 4 mg/0.1 mL nasal spray Administer 1 spray into a nostril. If no response after 2-3 minutes, give another dose in the other nostril using a new spray.  Qty: 1 each, Refills: 1    Associated Diagnoses: Chronic intractable pain      predniSONE 20 mg tablet Take 2 tablets (40 mg total) by mouth daily for 5 days  Qty: 10 tablet, Refills: 0    Associated Diagnoses: Cough      tiZANidine (ZANAFLEX) 4 mg tablet Take 1 tablet (4 mg total) by mouth every 8 (eight) hours as needed for muscle spasms  Qty: 120 tablet, Refills: 2    Associated Diagnoses: Muscle spasms of both lower extremities      !! Unifine SafeControl Pen Needle 30G X 5 MM MISC Inject under the skin 5 (five) times a day 5 times a day use  Qty: 150 each, Refills: 6    Associated Diagnoses: Uncontrolled type 2 diabetes mellitus with hyperglycemia ([facility])      Ventolin  (90 Base) MCG/ACT inhaler INHALE 2 PUFFS EVERY 6 (SIX) HOURS AS NEEDED FOR WHEEZING  Qty: 18 g, Refills: 5    Comments: Substitution to a formulary equivalent within the same pharmaceutical class is authorized.  Associated Diagnoses:  Simple chronic bronchitis (HCC)       !! - Potential duplicate medications found. Please discuss with provider.          No discharge procedures on file.    PDMP Review         Value Time User    PDMP Reviewed  Yes 8/16/2024  9:58 AM Tuesday MANAS [place]            ED Provider  Electronically Signed by             Steven Lopez DO  08/24/24 4645

## 2024-08-24 NOTE — ASSESSMENT & PLAN NOTE
Uncontrolled diabetes with HbA1c 11.2. 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      Continue crocker usage   Urology f/u outpatient for urodynamic flex cystoscopy   Continue Flomax 0.8 mg daily

## 2024-08-24 NOTE — ASSESSMENT & PLAN NOTE
Patient with CLL and history of leukocytosis. Follows up with hematology-oncology outpatient. Last visit was in April where he agreed to begin IVIG, insurance does not cover.     -POA: WBC 32.11, LA 2.7>2.4. Procal neg  Pt also has tachypnea tachycardia.    ED course: Unasyn 3 g    Less likely infectious  Tachypnea most likely due to dehydration  Tachycardia most likely due to chronic afib  Leukocytosis is chronic        Will hold off of further Abx for now  Will repeat LA after fluid starts  NS  CC/hr for hyponatremia  Monitor vitals and WBC  Follow up outpatient heme/onc for further CLL management plan

## 2024-08-24 NOTE — ASSESSMENT & PLAN NOTE
Lab Results   Component Value Date    HGBA1C 11.2 (H) 08/19/2024       Recent Labs     08/22/24  1350 08/24/24  1143   POCGLU 186* 233*       Blood Sugar Average: Last 72 hrs:  (P) 233    Chronic, UNCONTROLLED. Prior A1c 9.7 on 4/2/24.     Home medication includes gabapentin 800 mg 4 times daily, glargine 50 units twice daily and lispro 15 units 3 times daily with meals.      Continue home medications   Diabetic diet   ISS  Continue home gabapentin

## 2024-08-24 NOTE — H&P
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   < >  --    HS TNI 2HR ng/L 18  --   --   --    HS TNI 4HR ng/L  --   --   --  9    < > = values in this interval not displayed.      Results from last 7 days   Lab Units 08/22/24  1343   CK TOTAL U/L 255        Imaging: I have personally reviewed pertinent reports.    CTA dissection protocol chest abdomen pelvis w wo contrast    Result Date: 8/24/2024  No acute findings in the chest, abdomen or pelvis. Specifically, no acute aortic/arterial abnormality. Workstation performed: [id number]     CT stroke alert brain    Result Date: 8/24/2024  No acute intracranial abnormality. Findings were directly discussed with Shawn Smith at approximately 12:04 p.m. Workstation performed: [id number]     CTA stroke alert (head/neck)    Result Date: 8/24/2024  No large vessel occlusion. Mild carotid atherosclerotic disease. No hemodynamically significant stenosis of the carotid bifurcations Findings were directly discussed with Shawn Smith at 12:11 p.m. Workstation performed: [id number]     XR chest 1 view portable    Result Date: 8/22/2024  No acute cardiopulmonary disease. Workstation performed: [id number]     CT head without contrast    Result Date: 8/22/2024  No acute intracranial abnormality. Mild chronic microangiopathic ischemic changes. Workstation  performed: [id number]     XR chest 1 view portable    Result Date: 8/19/2024  No acute cardiopulmonary disease. Stable right-sided volume loss. Workstation performed: [id number]     CT spine thoracic & lumbar wo contrast    Result Date: 8/18/2024  No acute osseous abnormality of thoracic or lumbar spine. Partially imaged distended bladder. Recommend correlation with urinary output. Additional chronic/incidental findings as detailed above. The study was marked in EPIC for immediate notification. Workstation performed: [id number]     CT spine cervical wo contrast    Result Date: 8/18/2024  No acute cervical spine fracture or traumatic malalignment. Mildly prominent subcentimeter cervical lymph nodes, likely due to underlying chronic lymphocytic leukemia (CLL). Additional chronic/incidental findings as detailed above. Please see same-day CT head and CT thoracic lumbar spine without contrast for further evaluation. Workstation performed: [id number]     CT head wo contrast    Result Date: 8/18/2024  No acute intracranial abnormality. Mild chronic microangiopathy. Workstation performed: [id number]         EKG, Pathology, and Other Studies Reviewed on Admission:   EKG  Result Date: 08/24/24  Impression:  Atrial fibrillation            Entire H&P was discussed with Dr. Hurtado, who agreed to what is noted above     ** Please Note: This note has been constructed using a voice recognition system **     Chloe Delgadillo DO  08/24/24  5:30 PM

## 2024-08-24 NOTE — ASSESSMENT & PLAN NOTE
History of bipolar dx, depression with anxiety.  Home regimen: BUSPAR 10mg BID, Zoloft 50mg daily, Seroquel 300 mg daily at bedtime, and Lamictal 25 mg daily at bedtime.     Continue home medications

## 2024-08-24 NOTE — ASSESSMENT & PLAN NOTE
Chronic; stable     Home medication include Cozaar 50 mg daily and metoprolol 200 mg daily.      Continue home medication  Continue to monitor blood pressure  Consider adding spironolactone home med

## 2024-08-24 NOTE — ASSESSMENT & PLAN NOTE
Patient with CLL and history of leukocytosis.  Follows up with hematology-oncology outpatient. Last visit was in April where he agreed to begin IVIG, insurance does not cover.     -POA: WBC 32.11, LA 2.7>2.4. Procal neg    ED course: Unasyn 3 g     Follow up outpatient heme/onc for further CLL management plan

## 2024-08-24 NOTE — ASSESSMENT & PLAN NOTE
Chronic, stable. At home on Digoxin 0.25mcg PO daily & Metoprolol 200mg PO QD & Eliquis 5 mg BID.     -EKG: a-fib, no changes      Continue home medications  Digoxin level <0.3  On telemetry

## 2024-08-24 NOTE — QUICK NOTE
Called by  regarding stroke alert at 11:45 AM, neuro response was immediate.    63year old male with diabetes diabetic peripheral neuropathy, COPD, A-fib maintained on Eliquis, anxiety and panic disorder who is presenting with altered mental status, reported left-sided weakness which is not present on exam, acute back pain, and accelerated hypertension 206/99.  ED reports patient declined to cooperate with exam due to reported back pain however has been observed moving all 4 extremities without clear difficulty.  No observed asymmetries in motor behavior.   - NIHSS 0 while being observed.  - LKW unclear    CTH & CTA were unremarkable for any acute pathology, LVO, or other IR target.     IV thrombolysis was not offered due to unknown last known well and concurrent AC use.

## 2024-08-24 NOTE — ASSESSMENT & PLAN NOTE
TIA vs toxic metabolic encephalopathy    CT head: No acute intracranial abnormality   CTA head and neck: No large vessel occlusion. Mild carotid atherosclerotic disease. No hemodynamically significant stenosis of the carotid bifurcations  CTA dissection: No acute findings in the chest, abdomen or pelvis. Specifically, no acute aortic/arterial abnormality.    Started on stroke pathway.  Pt already had 162 mg ASA today    Patient had h/o taking Baclofen and Oxycodone together and having similar sxs. In last ED visit. Could be medication induced toxic metabolic encephalopathy.    Continue ASA 81 mg daily starting tomorrow  Start Lipitor 40 mg daily  F/u TSH, lipid panel, HbA1c  Will hold off of MRI for now and Echo was done 5 days ago (EF 57%)   Neurocheck, fall precaution, telemetry  Hold Baclofen  Continue pain regimen: Oxycodone 5 mg Q6 for moderate, 10 mg Q6 for severe pain; and Tylenol Q8 scheduled

## 2024-08-24 NOTE — ASSESSMENT & PLAN NOTE
History of CLL, follows with hematology oncology.  Patient was open to beginning IVIG treatment in April, but in insurance does not cover.    Chronic leukocytosis     Follow up with heme/onc outpatient

## 2024-08-24 NOTE — ASSESSMENT & PLAN NOTE
Hx of COPD and chronic hypoxic respiratory failure, on 3 L of oxygen during the day and BiPAP with 3 L of oxygen at night.  Currently on baseline oxygen requirements.   New cough  F/u CXR  Continue to monitor SpO2  Continue supplemental oxygen  Continue BiPAP nightly  Respiratory protocol

## 2024-08-24 NOTE — ASSESSMENT & PLAN NOTE
Lab Results   Component Value Date    HGBA1C 11.2 (H) 08/19/2024       Recent Labs     08/22/24  1350 08/24/24  1143   POCGLU 186* 233*       Blood Sugar Average: Last 72 hrs:  (P) 233    Continue home gabapentin 800 mg 4 times daily.

## 2024-08-24 NOTE — ASSESSMENT & PLAN NOTE
Wt Readings from Last 3 Encounters:   08/25/24 107 kg (235 lb 3.7 oz)   08/22/24 106 kg (234 lb)   08/20/24 107 kg (234 lb 12.8 oz)     Chronic; stable.     Home medications includes spironolactone 25 mg daily, digoxin 250 mcg daily metoprolol 200 mg daily and Bumex 1 mg as needed.  Last echo of 2024 showed EF 57% unable to assess diastolic function due to AFIB.     Continue home medications.

## 2024-08-25 PROBLEM — R41.82 ALTERED MENTAL STATUS: Status: ACTIVE | Noted: 2024-08-25

## 2024-08-25 PROBLEM — R47.1 DYSARTHRIA: Status: ACTIVE | Noted: 2024-08-25

## 2024-08-25 PROBLEM — K59.00 CONSTIPATION: Status: ACTIVE | Noted: 2024-08-25

## 2024-08-25 LAB
ALBUMIN SERPL BCG-MCNC: 3.7 G/DL (ref 3.5–5)
ALP SERPL-CCNC: 51 U/L (ref 34–104)
ALT SERPL W P-5'-P-CCNC: 14 U/L (ref 7–52)
ANION GAP SERPL CALCULATED.3IONS-SCNC: 5 MMOL/L (ref 4–13)
ANION GAP SERPL CALCULATED.3IONS-SCNC: 6 MMOL/L (ref 4–13)
ANISOCYTOSIS BLD QL SMEAR: PRESENT
AST SERPL W P-5'-P-CCNC: 19 U/L (ref 13–39)
BASOPHILS # BLD AUTO: 0.03 THOUSANDS/ÂΜL (ref 0–0.1)
BASOPHILS # BLD MANUAL: 0 THOUSAND/UL (ref 0–0.1)
BASOPHILS NFR BLD AUTO: 0 % (ref 0–1)
BASOPHILS NFR MAR MANUAL: 0 % (ref 0–1)
BILIRUB SERPL-MCNC: 0.68 MG/DL (ref 0.2–1)
BUN SERPL-MCNC: 10 MG/DL (ref 5–25)
BUN SERPL-MCNC: 8 MG/DL (ref 5–25)
CALCIUM SERPL-MCNC: 8.6 MG/DL (ref 8.4–10.2)
CALCIUM SERPL-MCNC: 8.9 MG/DL (ref 8.4–10.2)
CHLORIDE SERPL-SCNC: 100 MMOL/L (ref 96–108)
CHLORIDE SERPL-SCNC: 95 MMOL/L (ref 96–108)
CO2 SERPL-SCNC: 28 MMOL/L (ref 21–32)
CO2 SERPL-SCNC: 31 MMOL/L (ref 21–32)
CREAT SERPL-MCNC: 0.61 MG/DL (ref 0.6–1.3)
CREAT SERPL-MCNC: 0.71 MG/DL (ref 0.6–1.3)
EOSINOPHIL # BLD AUTO: 0.14 THOUSAND/ÂΜL (ref 0–0.61)
EOSINOPHIL # BLD MANUAL: 0 THOUSAND/UL (ref 0–0.4)
EOSINOPHIL NFR BLD AUTO: 1 % (ref 0–6)
EOSINOPHIL NFR BLD MANUAL: 0 % (ref 0–6)
ERYTHROCYTE [DISTWIDTH] IN BLOOD BY AUTOMATED COUNT: 13.2 % (ref 11.6–15.1)
ERYTHROCYTE [DISTWIDTH] IN BLOOD BY AUTOMATED COUNT: 13.2 % (ref 11.6–15.1)
GFR SERPL CREATININE-BSD FRML MDRD: 105 ML/MIN/1.73SQ M
GFR SERPL CREATININE-BSD FRML MDRD: 99 ML/MIN/1.73SQ M
GLUCOSE P FAST SERPL-MCNC: 147 MG/DL (ref 65–99)
GLUCOSE SERPL-MCNC: 111 MG/DL (ref 65–140)
GLUCOSE SERPL-MCNC: 147 MG/DL (ref 65–140)
GLUCOSE SERPL-MCNC: 162 MG/DL (ref 65–140)
GLUCOSE SERPL-MCNC: 226 MG/DL (ref 65–140)
GLUCOSE SERPL-MCNC: 85 MG/DL (ref 65–140)
HCT VFR BLD AUTO: 35.5 % (ref 36.5–49.3)
HCT VFR BLD AUTO: 37.7 % (ref 36.5–49.3)
HGB BLD-MCNC: 12 G/DL (ref 12–17)
HGB BLD-MCNC: 12.5 G/DL (ref 12–17)
IMM GRANULOCYTES # BLD AUTO: 0.06 THOUSAND/UL (ref 0–0.2)
IMM GRANULOCYTES NFR BLD AUTO: 0 % (ref 0–2)
LYMPHOCYTES # BLD AUTO: 17.87 THOUSANDS/ÂΜL (ref 0.6–4.47)
LYMPHOCYTES # BLD AUTO: 19.13 THOUSAND/UL (ref 0.6–4.47)
LYMPHOCYTES # BLD AUTO: 70 % (ref 14–44)
LYMPHOCYTES NFR BLD AUTO: 81 % (ref 14–44)
MAGNESIUM SERPL-MCNC: 1.9 MG/DL (ref 1.9–2.7)
MCH RBC QN AUTO: 31 PG (ref 26.8–34.3)
MCH RBC QN AUTO: 31.1 PG (ref 26.8–34.3)
MCHC RBC AUTO-ENTMCNC: 33.2 G/DL (ref 31.4–37.4)
MCHC RBC AUTO-ENTMCNC: 33.8 G/DL (ref 31.4–37.4)
MCV RBC AUTO: 92 FL (ref 82–98)
MCV RBC AUTO: 94 FL (ref 82–98)
MONOCYTES # BLD AUTO: 0.65 THOUSAND/ÂΜL (ref 0.17–1.22)
MONOCYTES # BLD AUTO: 0.76 THOUSAND/UL (ref 0–1.22)
MONOCYTES NFR BLD AUTO: 3 % (ref 4–12)
MONOCYTES NFR BLD: 3 % (ref 4–12)
MRSA NOSE QL CULT: NORMAL
NEUTROPHILS # BLD AUTO: 3.18 THOUSANDS/ÂΜL (ref 1.85–7.62)
NEUTROPHILS # BLD MANUAL: 5.29 THOUSAND/UL (ref 1.85–7.62)
NEUTS SEG NFR BLD AUTO: 15 % (ref 43–75)
NEUTS SEG NFR BLD AUTO: 21 % (ref 43–75)
NRBC BLD AUTO-RTO: 0 /100 WBCS
PHOSPHATE SERPL-MCNC: 2.9 MG/DL (ref 2.3–4.1)
PLATELET # BLD AUTO: 183 THOUSANDS/UL (ref 149–390)
PLATELET # BLD AUTO: 192 THOUSANDS/UL (ref 149–390)
PLATELET BLD QL SMEAR: ADEQUATE
PMV BLD AUTO: 9.1 FL (ref 8.9–12.7)
PMV BLD AUTO: 9.2 FL (ref 8.9–12.7)
POTASSIUM SERPL-SCNC: 4.1 MMOL/L (ref 3.5–5.3)
POTASSIUM SERPL-SCNC: 4.2 MMOL/L (ref 3.5–5.3)
PROT SERPL-MCNC: 6.1 G/DL (ref 6.4–8.4)
RBC # BLD AUTO: 3.87 MILLION/UL (ref 3.88–5.62)
RBC # BLD AUTO: 4.02 MILLION/UL (ref 3.88–5.62)
RBC MORPH BLD: PRESENT
SMUDGE CELLS BLD QL SMEAR: PRESENT
SODIUM SERPL-SCNC: 129 MMOL/L (ref 135–147)
SODIUM SERPL-SCNC: 136 MMOL/L (ref 135–147)
VARIANT LYMPHS # BLD AUTO: 6 %
WBC # BLD AUTO: 21.93 THOUSAND/UL (ref 4.31–10.16)
WBC # BLD AUTO: 25.17 THOUSAND/UL (ref 4.31–10.16)

## 2024-08-25 PROCEDURE — 94760 N-INVAS EAR/PLS OXIMETRY 1: CPT

## 2024-08-25 PROCEDURE — 80053 COMPREHEN METABOLIC PANEL: CPT

## 2024-08-25 PROCEDURE — 94660 CPAP INITIATION&MGMT: CPT

## 2024-08-25 PROCEDURE — 99232 SBSQ HOSP IP/OBS MODERATE 35: CPT | Performed by: INTERNAL MEDICINE

## 2024-08-25 PROCEDURE — 97167 OT EVAL HIGH COMPLEX 60 MIN: CPT

## 2024-08-25 PROCEDURE — 83735 ASSAY OF MAGNESIUM: CPT

## 2024-08-25 PROCEDURE — 84100 ASSAY OF PHOSPHORUS: CPT

## 2024-08-25 PROCEDURE — 82948 REAGENT STRIP/BLOOD GLUCOSE: CPT

## 2024-08-25 PROCEDURE — 97110 THERAPEUTIC EXERCISES: CPT

## 2024-08-25 PROCEDURE — 94640 AIRWAY INHALATION TREATMENT: CPT

## 2024-08-25 PROCEDURE — 97163 PT EVAL HIGH COMPLEX 45 MIN: CPT

## 2024-08-25 PROCEDURE — 85025 COMPLETE CBC W/AUTO DIFF WBC: CPT

## 2024-08-25 RX ORDER — OXYCODONE HYDROCHLORIDE 5 MG/1
5 TABLET ORAL EVERY 4 HOURS PRN
Status: DISCONTINUED | OUTPATIENT
Start: 2024-08-25 | End: 2024-08-26 | Stop reason: HOSPADM

## 2024-08-25 RX ORDER — POLYETHYLENE GLYCOL 3350 17 G/17G
17 POWDER, FOR SOLUTION ORAL DAILY
Status: DISCONTINUED | OUTPATIENT
Start: 2024-08-25 | End: 2024-08-26 | Stop reason: HOSPADM

## 2024-08-25 RX ORDER — SENNOSIDES 8.6 MG
1 TABLET ORAL
Status: DISCONTINUED | OUTPATIENT
Start: 2024-08-25 | End: 2024-08-26 | Stop reason: HOSPADM

## 2024-08-25 RX ORDER — SODIUM CHLORIDE 1 G/1
1 TABLET ORAL
Status: DISCONTINUED | OUTPATIENT
Start: 2024-08-25 | End: 2024-08-26 | Stop reason: HOSPADM

## 2024-08-25 RX ORDER — DOCUSATE SODIUM 100 MG/1
100 CAPSULE, LIQUID FILLED ORAL 2 TIMES DAILY
Status: DISCONTINUED | OUTPATIENT
Start: 2024-08-25 | End: 2024-08-26 | Stop reason: HOSPADM

## 2024-08-25 RX ADMIN — GABAPENTIN 800 MG: 400 CAPSULE ORAL at 08:22

## 2024-08-25 RX ADMIN — IPRATROPIUM BROMIDE AND ALBUTEROL SULFATE 3 ML: .5; 3 SOLUTION RESPIRATORY (INHALATION) at 17:30

## 2024-08-25 RX ADMIN — ACETAMINOPHEN 975 MG: 325 TABLET ORAL at 17:10

## 2024-08-25 RX ADMIN — INSULIN LISPRO 15 UNITS: 100 INJECTION, SOLUTION INTRAVENOUS; SUBCUTANEOUS at 11:59

## 2024-08-25 RX ADMIN — POLYETHYLENE GLYCOL 3350 17 G: 17 POWDER, FOR SOLUTION ORAL at 11:59

## 2024-08-25 RX ADMIN — IPRATROPIUM BROMIDE AND ALBUTEROL SULFATE 3 ML: .5; 3 SOLUTION RESPIRATORY (INHALATION) at 07:49

## 2024-08-25 RX ADMIN — DOCUSATE SODIUM 100 MG: 100 CAPSULE, LIQUID FILLED ORAL at 11:59

## 2024-08-25 RX ADMIN — LAMOTRIGINE 25 MG: 25 TABLET ORAL at 21:22

## 2024-08-25 RX ADMIN — GABAPENTIN 800 MG: 400 CAPSULE ORAL at 17:10

## 2024-08-25 RX ADMIN — UMECLIDINIUM 1 PUFF: 62.5 AEROSOL, POWDER ORAL at 08:20

## 2024-08-25 RX ADMIN — QUETIAPINE FUMARATE 300 MG: 100 TABLET ORAL at 21:22

## 2024-08-25 RX ADMIN — INSULIN LISPRO 15 UNITS: 100 INJECTION, SOLUTION INTRAVENOUS; SUBCUTANEOUS at 16:23

## 2024-08-25 RX ADMIN — INSULIN GLARGINE 50 UNITS: 100 INJECTION, SOLUTION SUBCUTANEOUS at 21:36

## 2024-08-25 RX ADMIN — SODIUM CHLORIDE 1 G: 1 TABLET ORAL at 11:59

## 2024-08-25 RX ADMIN — ASPIRIN 81 MG CHEWABLE TABLET 81 MG: 81 TABLET CHEWABLE at 08:22

## 2024-08-25 RX ADMIN — APIXABAN 5 MG: 5 TABLET, FILM COATED ORAL at 17:10

## 2024-08-25 RX ADMIN — TAMSULOSIN HYDROCHLORIDE 0.8 MG: 0.4 CAPSULE ORAL at 17:10

## 2024-08-25 RX ADMIN — LIDOCAINE 2 PATCH: 50 PATCH CUTANEOUS at 08:21

## 2024-08-25 RX ADMIN — FLUTICASONE FUROATE AND VILANTEROL TRIFENATATE 1 PUFF: 100; 25 POWDER RESPIRATORY (INHALATION) at 08:20

## 2024-08-25 RX ADMIN — GABAPENTIN 800 MG: 400 CAPSULE ORAL at 11:59

## 2024-08-25 RX ADMIN — OXYCODONE HYDROCHLORIDE 5 MG: 5 TABLET ORAL at 13:18

## 2024-08-25 RX ADMIN — ACETAMINOPHEN 975 MG: 325 TABLET ORAL at 08:21

## 2024-08-25 RX ADMIN — Medication 1 G: at 08:22

## 2024-08-25 RX ADMIN — OXYCODONE HYDROCHLORIDE 5 MG: 5 TABLET ORAL at 08:22

## 2024-08-25 RX ADMIN — INSULIN LISPRO 1 UNITS: 100 INJECTION, SOLUTION INTRAVENOUS; SUBCUTANEOUS at 08:20

## 2024-08-25 RX ADMIN — SODIUM CHLORIDE 75 ML/HR: 0.9 INJECTION, SOLUTION INTRAVENOUS at 04:50

## 2024-08-25 RX ADMIN — OXYCODONE HYDROCHLORIDE 5 MG: 5 TABLET ORAL at 21:42

## 2024-08-25 RX ADMIN — OXYCODONE HYDROCHLORIDE 5 MG: 5 TABLET ORAL at 17:10

## 2024-08-25 RX ADMIN — LOSARTAN POTASSIUM 50 MG: 50 TABLET, FILM COATED ORAL at 08:22

## 2024-08-25 RX ADMIN — METOPROLOL SUCCINATE 200 MG: 100 TABLET, EXTENDED RELEASE ORAL at 08:22

## 2024-08-25 RX ADMIN — ATORVASTATIN CALCIUM 40 MG: 40 TABLET, FILM COATED ORAL at 17:10

## 2024-08-25 RX ADMIN — APIXABAN 5 MG: 5 TABLET, FILM COATED ORAL at 08:22

## 2024-08-25 RX ADMIN — BUSPIRONE HYDROCHLORIDE 10 MG: 10 TABLET ORAL at 17:10

## 2024-08-25 RX ADMIN — INSULIN LISPRO 15 UNITS: 100 INJECTION, SOLUTION INTRAVENOUS; SUBCUTANEOUS at 08:20

## 2024-08-25 RX ADMIN — INSULIN GLARGINE 50 UNITS: 100 INJECTION, SOLUTION SUBCUTANEOUS at 08:21

## 2024-08-25 RX ADMIN — DIGOXIN 250 MCG: 125 TABLET ORAL at 08:22

## 2024-08-25 RX ADMIN — SERTRALINE HYDROCHLORIDE 50 MG: 50 TABLET ORAL at 21:22

## 2024-08-25 RX ADMIN — BUSPIRONE HYDROCHLORIDE 10 MG: 10 TABLET ORAL at 08:22

## 2024-08-25 NOTE — PLAN OF CARE
Problem: PHYSICAL THERAPY ADULT  Goal: Performs mobility at highest level of function for planned discharge setting.  See evaluation for individualized goals.  Description: Treatment/Interventions: Therapeutic exercise, LE strengthening/ROM, Functional transfer training, Bed mobility, Gait training, Equipment eval/education, Endurance training          See flowsheet documentation for full assessment, interventions and recommendations.  Note: Prognosis: Good  Problem List: Decreased strength, Impaired balance, Decreased mobility, Pain  Assessment: Pt is 63 y.o. male seen for PT evaluation s/p admit to [facility] on 8/24/2024 w/ Stroke-like symptom. PT consulted to assess pt's functional mobility and d/c needs. Order placed for PT eval and tx, w/ OOB order.  Co-morbidities affecting patient's physical performance include: chronic LBP, obesity, bipolar disorder, CLL, heart failure, DM with neuropathy, ambulatory dysfunction.  Personal factors affecting patient at time of initial evaluation include: ambulating with assistive device, inability to ambulate household distances, limited home support, inability to perform ADLS, and inability to live alone. Prior to admission, patient was independent with functional mobility with walker and 02 .  Upon evaluation: Pt was able to ambulate 30 feet with min assist with a walker/02.  Pt declined additional activity due to fatigue.   Please find objective findings from Physical Therapy assessment regarding body systems outlined above with impairments and limitations including weakness, decreased endurance, gait deviations, decreased activity tolerance, and decreased functional mobility tolerance.The Barthel Index was used as a functional outcome tool presenting with a score of Barthel Index Score: 40 today indicating marked limitations of functional mobility and ADLS.  Patient's clinical presentation is currently unstable/unpredictable as seen in patient's presentation of  changing level of pain, increased fall risk, new onset of impairment of functional mobility, and new onset of weakness. Pt would benefit from continued Physical Therapy treatment to address deficits as defined above and maximize level of functional mobility.     As demonstrated by objective findings, the assigned level of complexity for this evaluation is high.The patient's AM-PAC Basic Mobility Inpatient Short Form Raw Score is 16. A Raw score of less than or equal to 16 suggests the patient may benefit from discharge to post-acute rehabilitation services. Please also refer to the recommendation of the Physical Therapist for safe discharge planning.        Rehab Resource Intensity Level, PT: II (Moderate Resource Intensity)    See flowsheet documentation for full assessment.
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
## 2024-08-25 NOTE — ASSESSMENT & PLAN NOTE
Mild encephalopathy, likely due to hyponatremia.   -Lab and urine studies suspicious for SIADH   -Hyponatremia since corrected.  - Would expect patient's encephalopathy to improve and/or resolve over the next 24 to 48 hours.  - No clear need for any additional neuroimaging at this time.

## 2024-08-25 NOTE — OCCUPATIONAL THERAPY NOTE
"Occupational Therapy Evaluation/Treatment       08/25/24 1400   OT Last Visit   OT Visit Date 08/25/24   Note Type   Note type Evaluation   Pain Assessment   Pain Assessment Tool 0-10   Pain Score 8   Pain Location/Orientation Location: Generalized   Pain Onset/Description Other (Comment)  (\"pain is all over, but tolerable because they gave me something\")   Hospital Pain Intervention(s) Repositioned;Ambulation/increased activity;Emotional support  (MARY ANN Devi  aware)   Restrictions/Precautions   Other Precautions Chair Alarm;Bed Alarm;Fall Risk;Pain;O2   Home Living   Type of Home Apartment  ([address], [address]nd floor)   Home Layout One level;Elevator   Bathroom Shower/Tub Walk-in shower   Bathroom Toilet Raised   Bathroom Equipment Shower chair;Grab bars in shower;Hand-held shower;Toilet raiser;Grab bars around toilet   Bathroom Accessibility Accessible via walker   Home Equipment Walker;Wheelchair-manual;Electric scooter  (rollator walker, O2)   Additional Comments Pt uses electric scooter most times, including in apt, but can ambulate short idstances with RW   Prior Function   Level of [place] Independent with functional mobility;Needs assistance with ADLs;Needs assistance with IADLS   Lives With Alone   Receives Help From Home health  (5 hours/day M-F; requesting HHA assist for weekends too)   IADLs Family/Friend/Other provides transportation;Family/Friend/Other provides meals;Family/Friend/Other provides medication management   Falls in the last 6 months 0   Vocational On disability   Subjective   Subjective \"I can't use my left side at all\"   ADL   Where Assessed Supine, bed   Eating Assistance 5  Supervision/Setup   Eating Deficit Setup  (per PT)   Additional Comments Unable to accurately assess ADLS as pt stated he could not move his extremities during evaluation due to pain or \"I think I had a stroke\". Minimal voluntary movement observed by this therapist, LUE worse than RUE. However, per nursing and PT " consulation, pt observed moving both elbows/wrists/hands to feed himself and BLE during functional mobility in and out of bed today.   Bed Mobility   Sit to Supine 4  Minimal assistance   Additional items LE management  (per PT eval)   Transfers   Sit to Stand 4  Minimal assistance  (per PT eval)   Stand to Sit 4  Minimal assistance  (per PT eval)   Stand pivot 4  Minimal assistance   Additional items   (with RW per PT eval)   Additional Comments pt stating he cannot move due to pain   Activity Tolerance   Activity Tolerance Patient limited by fatigue;Patient limited by pain   Nurse Made Aware MARY ANN Devi   RUE Assessment   RUE Assessment X  (elbow and hand flexion WFL)   LUE Assessment   LUE Assessment X  (pt yelled out in pain when making a partial fist, otherwise observed only minimal spontaneous movement)   Hand Function   Gross Motor Coordination Impaired   Fine Motor Coordination Impaired   Vision-Basic Assessment   Current Vision Wears glasses all the time   Patient Visual Report Blurring of vision when changing focal distance   Psychosocial   Psychosocial (WDL) X   Patient Behaviors/Mood Calm   Needs Expressed Physical   Ability to Express Feelings Able to express   Ability to Express Needs Able to express   Ability to Express Thoughts Able to express   Ability to Understand Others Understands   Cognition   Overall Cognitive Status Impaired   Arousal/Participation Alert   Attention Attends with cues to redirect   Orientation Level Oriented X4   Memory Within functional limits   Following Commands Follows one step commands with increased time or repetition   Comments decreased insight and judgement   Assessment   Limitation Decreased ADL status;Decreased UE ROM;Decreased UE strength;Decreased Safe judgement during ADL;Decreased cognition;Decreased endurance;Decreased fine motor control;Decreased self-care trans;Decreased high-level ADLs;Mood limitation  (decreased balance and mobility)   Prognosis Fair    Assessment Patient evaluated by Occupational Therapy.  Patient admitted with Stroke-like symptom.  The patients occupational profile, medical and therapy history includes a extensive additional review of physical, cognitive, or psychosocial history related to current functional performance.  Comorbidities affecting functional mobility and ADLS include: HTN, T2DM w/ long-term insulin use and neuropathy, SHAVONNE, chronic A-fib, HFpEF w/ EF 57%, Chronic lymphocytic leukemia, COPD, urinary retention, Bipolar disorder, chronic LBP, obesity, heart failure, and ambulatory dysfunction.  Prior to admission, patient was independent with functional mobility with electric scooter and walker, requiring assist for ADLS, and requiring assist for IADLS, living alone in apt with elevator.  The evaluation identifies the following performance deficits: weakness, decreased ROM, impaired balance, decreased endurance, decreased coordination, increased fall risk, new onset of impairment of functional mobility, decreased ADLS, decreased IADLS, pain, decreased activity tolerance, decreased safety awareness, impaired judgement, decreased cognition, decreased strength, and impaired psychosocial skills, that result in activity limitations and/or participation restrictions. This evaluation requires clinical decision making of high complexity, because the patient presents with comorbidites that affect occupational performance and required significant modification of tasks or assistance with consideration of multiple treatment options.  The Barthel Index was used as a functional outcome tool presenting with a score of Barthel Index Score: 40, indicating marked limitations of functional mobility and ADLS.  The patient's raw score on the -PAC Daily Activity Inpatient Short Form is 14. A raw score of less than 19 suggests the patient may benefit from discharge to post-acute rehabilitation services. Please refer to the recommendation of the  "Occupational Therapist for safe discharge planning.  Patient will benefit from skilled Occupational Therapy services to address above deficits and facilitate a safe return to prior level of function.   Goals   Patient Goals \"move my left side\"   STG Time Frame   (1-7)   Short Term Goal #1 Grooming: min assist seated; Bathing: mod assist; Upper Body Dressing mod assist; Lower Body Dressing: mod assist; Toileting: mod assist; P Patient will increase bed mobility to supervision in preparation for ADLS and transfers; Patient will increase functional mobility to and from bathroom with rolling walker with min assist to increase performance with ADLS and to use a toilet; Patient will improve functional activity tolerance to 5 minutes of sustained functional tasks to increase participation in basic self-care and decrease assistance level;  Patient will be able to to verbalize understanding and perform energy conservation/proper body mechanics during ADLS and functional mobility at least 75% of the time with minimal cueing to decrease signs of fatigue and increase stamina to return to prior level of function; Patient will increase static/dynamic sitting balance to fair+ to improve the ability to sit at edge of bed or on a chair for ADLS;  Patient will increase static/dynamic standing balance to fair to improve postural stability and decrease fall risk during standing ADLS and transfers.   LTG Time Frame   (8-14)   Long Term Goal #1 Grooming: supervision seated; Bathing: min assist; Upper Body Dressing min assist; Lower Body Dressing: min assist; Toileting: min assist; Patient will increase bed mobility to independent in preparation for ADLS and transfers; Patient will increase functional mobility to and from bathroom with rolling walker with supervision to increase performance with ADLS and to use a toilet;  Patient will improve functional activity tolerance to 10 minutes of sustained functional tasks to increase " participation in basic self-care and decrease assistance level;  Patient will be able to to verbalize understanding and perform energy conservation/proper body mechanics during ADLS and functional mobility at least 90% of the time with no cueing to decrease signs of fatigue and increase stamina to return to prior level of function; Patient will increase static/dynamic sitting balance to good to improve the ability to sit at edge of bed or on a chair for ADLS;  Patient will increase static/dynamic standing balance to fair+ to improve postural stability and decrease fall risk during standing ADLS and transfers.  Pt will score >/= 19/24 on AM-PAC Daily Activity Inpatient scale to promote safe independence with ADLs and functional mobility; Pt will score >/= 65/100 on Barthel Index in order to decrease caregiver assistance needed and increase ability to perform ADLs and functional mobility.   Plan   Treatment Interventions ADL retraining;Functional transfer training;UE strengthening/ROM;Endurance training;Cognitive reorientation;Patient/family training;Equipment evaluation/education;Neuromuscular reeducation;Fine motor coordination activities;Compensatory technique education;Energy conservation;Activityengagement   Goal Expiration Date 09/08/24   OT Frequency 3-5x/wk   Discharge Recommendation   Rehab Resource Intensity Level, OT II (Moderate Resource Intensity)   AM-PAC Daily Activity Inpatient   Lower Body Dressing 2   Bathing 2   Toileting 2   Upper Body Dressing 2   Grooming 2   Eating 4   Daily Activity Raw Score 14   Daily Activity Standardized Score (Calc for Raw Score >=11) 33.39   AM-PAC Applied Cognition Inpatient   Following a Speech/Presentation 4   Understanding Ordinary Conversation 4   Taking Medications 3   Remembering Where Things Are Placed or Put Away 4   Remembering List of 4-5 Errands 3   Taking Care of Complicated Tasks 3   Applied Cognition Raw Score 21   Applied Cognition Standardized Score 44.3  "  Barthel Index   Feeding 10   Bathing 0   Grooming Score 0   Dressing Score 5   Bladder Score 0   Bowels Score 10   Toilet Use Score 5   Transfers (Bed/Chair) Score 10   Mobility (Level Surface) Score 0   Stairs Score 0   Barthel Index Score 40   Modified Regan Scale   Modified Alston Scale 4   Additional Treatment Session   Start Time 1350   End Time 1400   Treatment Assessment Attempted P/AAROM exercises for shoulder, elbow, wrist and hand flexion/extension, but pt yelling in pain with minimal movement or touch. Stated he has no use of LUE \"at all\", despite nursing and PT reporting pt using BUE during ADLS and mobility tasks earlier today. Pt anxious about slightly tinged green/brown phlegm coughed up earlier and adamant that nurse be made aware. Reported to MARY ANN Devi who will follow up with pt. Pt also anxious about being transferred to [facility].  Education provided to pt regarding OT goals and need to mobilize in and out of bed as much as possible. Pt demonstrating good verbal understanding of all information provided and all questions answered. Patient left in bed with all needs within reach. Continue OT per POC.   End of Consult   Education Provided Yes   Patient Position at End of Consult Supine;Bed/Chair alarm activated;All needs within reach   Nurse Communication Nurse aware of consult   Licensure   [place] License Number  Nancy Tovar MS, OTR/L, [place] Lic# [id number]     "

## 2024-08-25 NOTE — ASSESSMENT & PLAN NOTE
Suspect due to lack of dentures.  - Patient reports he got dentures at home.  - No gross facial asymmetry to suspect central cause.  - No clear need for additional workup at this time.

## 2024-08-25 NOTE — PROGRESS NOTES
Daily Progress Note - [facility]  Family Medicine Residency  Alonzo Watson 63 y.o. male MRN: [id number]  Unit/Bed#: ICU 11 Encounter: [id number]  Admitting Physician: Kita Hurtado MD   PCP: MANAS Payne  Date of Admission:  8/24/2024 11:38 AM    Assessment and Plan    * Stroke-like symptom  Assessment & Plan  TIA vs toxic metabolic encephalopathy    CT head: No acute intracranial abnormality   CTA head and neck: No large vessel occlusion. Mild carotid atherosclerotic disease. No hemodynamically significant stenosis of the carotid bifurcations  CTA dissection: No acute findings in the chest, abdomen or pelvis. Specifically, no acute aortic/arterial abnormality.    Started on stroke pathway.  Pt already had 162 mg ASA today    Patient had h/o taking Baclofen and Oxycodone together and having similar sxs. In last ED visit. Could be medication induced toxic metabolic encephalopathy.    Continue ASA 81 mg daily starting tomorrow  Start Lipitor 40 mg daily  F/u TSH, lipid panel, HbA1c  Will hold off of MRI for now and Echo was done 5 days ago (EF 57%)   Neurocheck, fall precaution, telemetry  Hold Baclofen  Continue pain regimen: Oxycodone 5 mg Q6 for moderate, 10 mg Q6 for severe pain; and Tylenol Q8 scheduled      SIRS (systemic inflammatory response syndrome) (HCC)  Assessment & Plan  Patient with CLL and history of leukocytosis. Follows up with hematology-oncology outpatient. Last visit was in April where he agreed to begin IVIG, insurance does not cover.     -POA: WBC 32.11, LA 2.7>2.4. Procal neg  Pt also has tachypnea tachycardia.    ED course: Unasyn 3 g    Less likely infectious  Tachypnea most likely due to dehydration  Tachycardia most likely due to chronic afib  Leukocytosis is chronic        Will hold off of further Abx for now  Will repeat LA after fluid starts  NS  CC/hr for hyponatremia  Monitor vitals and WBC  Follow up outpatient heme/onc for further CLL management  plan    Constipation  Assessment & Plan  Reporting abdominal pain    -bowel regimen    Urinary retention  Assessment & Plan  Uncontrolled diabetes with HbA1c 11.2. 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      Continue crocker usage   Urology f/u outpatient for urodynamic flex cystoscopy   Continue Flomax 0.8 mg daily    Chronic obstructive pulmonary disease, unspecified ([facility])  Assessment & Plan  Chronic     Follows up with pulmonology.  Home medications include Trelegy daily, DuoNebs and Ventolin as needed.      Continue home medications    Bipolar disorder ([facility])  Assessment & Plan  History of bipolar dx, depression with anxiety.  Home regimen: BUSPAR 10mg BID, Zoloft 50mg daily, Seroquel 300 mg daily at bedtime, and Lamictal 25 mg daily at bedtime.     Continue home medications     CLL (chronic lymphocytic leukemia) ([facility])  Assessment & Plan  History of CLL, follows with hematology oncology.  Patient was open to beginning IVIG treatment in April, but in insurance does not cover.    Chronic leukocytosis     Follow up with heme/onc outpatient    Heart failure ([facility])  Assessment & Plan  Wt Readings from Last 3 Encounters:   08/24/24 107 kg (235 lb 7.2 oz)   08/22/24 106 kg (234 lb)   08/20/24 107 kg (234 lb 12.8 oz)     Chronic; stable.     Home medications includes spironolactone 25 mg daily, digoxin 250 mcg daily metoprolol 200 mg daily and Bumex 1 mg as needed.  Last echo of 2024 showed EF 57% unable to assess diastolic function due to AFIB.     Continue home medications.   Continue home digoxin and metoprolol. Hold home Bumex and aldactone due to Hyponatremia and dehydration.   Assessing volume status --> see hypernatremia labs  Patient appearing dehydrated therefore IV fluids started       Chronic a-fib ([facility])  Assessment & Plan  Chronic, stable. At home on Digoxin 0.25mcg PO daily & Metoprolol 200mg PO QD & Eliquis 5 mg BID.     -EKG: a-fib, no changes      Continue home medications  Digoxin  level <0.3  On telemetry     Obstructive sleep apnea  Assessment & Plan  Chronic; uses BiPAP 12/5 at bedtime.     Continue BiPAP use at bedtime    Type 2 diabetes mellitus with complication, with long-term current use of insulin ([facility])  Assessment & Plan  Lab Results   Component Value Date    HGBA1C 11.2 (H) 08/19/2024       Recent Labs     08/22/24  1350 08/24/24  1143   POCGLU 186* 233*       Blood Sugar Average: Last 72 hrs:  (P) 233    Chronic, UNCONTROLLED. Prior A1c 9.7 on 4/2/24.     Home medication includes gabapentin 800 mg 4 times daily, glargine 50 units twice daily and lispro 15 units 3 times daily with meals.      Continue home medications   Diabetic diet   ISS  Continue home gabapentin    Chronic respiratory failure (HCC)  Assessment & Plan  Hx of COPD and chronic hypoxic respiratory failure, on 3 L of oxygen during the day and BiPAP with 3 L of oxygen at night.  Currently on baseline oxygen requirements.     Continue to monitor SpO2  Continue supplemental oxygen  Continue BiPAP nightly  Respiratory protocol    Diabetic neuropathy (HCC)  Assessment & Plan  Lab Results   Component Value Date    HGBA1C 11.2 (H) 08/19/2024       Recent Labs     08/22/24  1350 08/24/24  1143   POCGLU 186* 233*       Blood Sugar Average: Last 72 hrs:  (P) 233    Continue home gabapentin 800 mg 4 times daily.     Essential hypertension  Assessment & Plan  Chronic; stable     Home medication include Cozaar 50 mg daily and metoprolol 200 mg daily.      Continue home medication  Continue to monitor blood pressure    Hyponatremia  Assessment & Plan  Acute on Chronic, baseline 134-135 corrected.  POA Na 126 - corrected 128.  Sodium 1g daily at home    Continue salt tablet as 1g TID  Will start NS IV fluid 100cc/hr  Will monitor sodium with Q6hr BMP with goal of corrected Sodium 133 by tomorrow  F/u urine sodium, urea, creatinine, osmolality  F/u serum uric acid, osmolality        VTE Pharmacologic Prophylaxis: VTE Score: 6 on  eliquis    Patient Centered Rounds: evaluated patient      Time Spent for Care: 15 minutes.  More than 50% of total time spent on counseling and coordination of care as described above.    Current Length of Stay: 0 day(s)    Current Patient Status: Observation   Certification Statement: The patient will continue to require additional inpatient hospital stay due to observation of symptoms    Discharge Plan: 24-48 hours    Code Status: Level 1 - Full Code    Subjective:   Feeling better today. Weak. Denies blurry vision, double vision, chest pain, shortness of breath, ab pain, rectal bleeding.     Objective     Objective:   Vitals:   Temp (24hrs), Av.3 °F (36.3 °C), Min:97 °F (36.1 °C), Max:97.6 °F (36.4 °C)    Temp:  [97 °F (36.1 °C)-97.6 °F (36.4 °C)] 97.6 °F (36.4 °C)  HR:  [62-83] 71  Resp:  [14-33] 16  BP: (137-183)/() 158/77  SpO2:  [97 %-100 %] 100 %  Body mass index is 32.81 kg/m².     Input and Output Summary (last 24 hours):       Intake/Output Summary (Last 24 hours) at 2024 1803  Last data filed at 2024 1601  Gross per 24 hour   Intake 2230.95 ml   Output 6650 ml   Net -4419.05 ml       Physical Exam:   Physical Exam  Constitutional:       General: He is not in acute distress.     Appearance: Normal appearance. He is normal weight. He is not ill-appearing, toxic-appearing or diaphoretic.   HENT:      Right Ear: External ear normal.      Left Ear: External ear normal.      Nose: No congestion or rhinorrhea.      Mouth/Throat:      Pharynx: No oropharyngeal exudate.   Eyes:      Conjunctiva/sclera: Conjunctivae normal.   Cardiovascular:      Rate and Rhythm: Normal rate and regular rhythm.      Heart sounds: Normal heart sounds. No murmur heard.     No friction rub. No gallop.   Pulmonary:      Effort: Pulmonary effort is normal. No respiratory distress.      Breath sounds: No stridor. No wheezing, rhonchi or rales.   Abdominal:      Tenderness: There is abdominal tenderness.    Musculoskeletal:      Cervical back: No rigidity.      Right lower leg: No edema.      Left lower leg: No edema.   Skin:     Coloration: Skin is not jaundiced.   Neurological:      Mental Status: He is alert and oriented to person, place, and time.   Psychiatric:         Mood and Affect: Mood normal.         Behavior: Behavior normal.         Thought Content: Thought content normal.         Judgment: Judgment normal.           Additional Data:     Labs:  Results from last 7 days   Lab Units 08/25/24  0621   WBC Thousand/uL 21.93*   HEMOGLOBIN g/dL 12.5   HEMATOCRIT % 37.7   PLATELETS Thousands/uL 183   SEGS PCT % 15*   LYMPHO PCT % 81*   MONO PCT % 3*   EOS PCT % 1     Results from last 7 days   Lab Units 08/25/24  0621   POTASSIUM mmol/L 4.2   CHLORIDE mmol/L 100   CO2 mmol/L 31   BUN mg/dL 8   CREATININE mg/dL 0.71   CALCIUM mg/dL 8.9   ALK PHOS U/L 51   ALT U/L 14   AST U/L 19     Results from last 7 days   Lab Units 08/24/24  1150   INR  0.90     Results from last 7 days   Lab Units 08/25/24  1607 08/25/24  1114 08/25/24  0642 08/24/24  2022 08/24/24  1652   POC GLUCOSE mg/dl 85 111 162* 117 157*     Results from last 7 days   Lab Units 08/24/24  1150 08/19/24  0503   HEMOGLOBIN A1C % 10.4* 11.2*       * I Have Reviewed All Lab Data Listed Above.  * Additional Pertinent Lab Tests Reviewed: All Labs For Current Hospital Admission Reviewed    Imaging:    Imaging Reports Reviewed Today Include: CTA stroke, CT dissection    Recent Cultures (last 7 days):     Results from last 7 days   Lab Units 08/24/24  1222   BLOOD CULTURE  No Growth at 24 hrs.       Last 24 Hours Medication List:   Current Facility-Administered Medications   Medication Dose Route Frequency Provider Last Rate    acetaminophen  650 mg Oral Q6H PRN Chloe Onyeukwu, DO      acetaminophen  975 mg Oral Q8H LELAND Chloe Onyeukwu, DO      apixaban  5 mg Oral BID Chloe Onyeukwu, DO      aspirin  81 mg Oral Daily Chloe Onyeukwu, DO      atorvastatin   40 mg Oral QPM Chloe Onyeukwu, DO      busPIRone  10 mg Oral BID Chloe Onyeukwu, DO      digoxin  250 mcg Oral Daily Chloee Onyeukwu, DO      docusate sodium  100 mg Oral BID Michelle Salinas MD      Fluticasone Furoate-Vilanterol  1 puff Inhalation Daily Chloe Onyeukwu, DO      And    umeclidinium  1 puff Inhalation Daily Chloee Onyeukwu, DO      gabapentin  800 mg Oral 4x Daily Sammie Gonzales MD      insulin glargine  50 Units Subcutaneous Q12H LELAND Chloe Onyeukwu, DO      insulin lispro  1-6 Units Subcutaneous 4x Daily (AC & HS) Valerie Onyeukwu, DO      insulin lispro  15 Units Subcutaneous TID With Meals Valerie Onyeukwu, DO      ipratropium-albuterol  3 mL Nebulization Q6H PRN Valerie Onyeukwu, DO      lamoTRIgine  25 mg Oral HS Chloee Onyeukwu, DO      lidocaine  2 patch Topical Daily Valerie Onyeukwu, DO      losartan  50 mg Oral Daily Valerie Onyeukwu, DO      metoprolol succinate  200 mg Oral Daily Valerie Onyeukwu, DO      naloxone  0.04 mg Intravenous Q1MIN PRN Chloe Onyeukwu, DO      oxyCODONE  5 mg Oral Q4H PRN Katia Zheng, DO      polyethylene glycol  17 g Oral Daily Michelle Salinas MD      QUEtiapine  300 mg Oral HS Chloee Onyeukwu, DO      senna  1 tablet Oral HS Michelle Salinas MD      sertraline  50 mg Oral HS Chloe Onyeukwu, DO      sodium chloride  1 g Oral Daily Before Lunch Michelle Salinas MD      tamsulosin  0.8 mg Oral Daily With Dinner Chloe Onyeukwu, DO               ** Please Note: Dictation voice to text software may have been used in the creation of this document. **    Michelle Salinas MD  08/25/24  6:03 PM

## 2024-08-25 NOTE — SPEECH THERAPY NOTE
Consult received and chart reviewed. Per chart review, and discussion with RN, pt passed RN dysphagia assessment and is tolerating regular diet with thin liquids with no s/s of aspiration. No dysarthria or language deficits noted or reported. Therefore, formal speech/swallow evaluation not indicated at this time. If new concerns arise, please reconsult.      Soila Grijalva M.S., CCC-SLP  Speech Language Pathologist   Available via [employer]  [place] #[id number]  [place] #[id number]

## 2024-08-25 NOTE — ASSESSMENT & PLAN NOTE
Suspect related to peripheral neuropathy given involvement of all 4 extremities.  - Possibly exacerbated by hyponatremia and associated fluid shifts.  - Should improve over the next 24 to 48 hours although unlikely to resolve given baseline neuropathy.  - Continue home dose of gabapentin as currently ordered.  -

## 2024-08-26 VITALS
RESPIRATION RATE: 18 BRPM | TEMPERATURE: 97.6 F | DIASTOLIC BLOOD PRESSURE: 92 MMHG | HEIGHT: 71 IN | WEIGHT: 235.23 LBS | BODY MASS INDEX: 32.93 KG/M2 | HEART RATE: 84 BPM | SYSTOLIC BLOOD PRESSURE: 166 MMHG | OXYGEN SATURATION: 99 %

## 2024-08-26 PROBLEM — Z80.6 FAMILY HISTORY OF CLL (CHRONIC LYMPHOID LEUKEMIA): Status: ACTIVE | Noted: 2024-08-26

## 2024-08-26 LAB
ANION GAP SERPL CALCULATED.3IONS-SCNC: 7 MMOL/L (ref 4–13)
ATRIAL RATE: 72 BPM
BUN SERPL-MCNC: 12 MG/DL (ref 5–25)
CALCIUM SERPL-MCNC: 8.8 MG/DL (ref 8.4–10.2)
CHLORIDE SERPL-SCNC: 98 MMOL/L (ref 96–108)
CO2 SERPL-SCNC: 28 MMOL/L (ref 21–32)
CREAT SERPL-MCNC: 0.82 MG/DL (ref 0.6–1.3)
ERYTHROCYTE [DISTWIDTH] IN BLOOD BY AUTOMATED COUNT: 13.2 % (ref 11.6–15.1)
GFR SERPL CREATININE-BSD FRML MDRD: 93 ML/MIN/1.73SQ M
GLUCOSE SERPL-MCNC: 142 MG/DL (ref 65–140)
GLUCOSE SERPL-MCNC: 158 MG/DL (ref 65–140)
GLUCOSE SERPL-MCNC: 181 MG/DL (ref 65–140)
GLUCOSE SERPL-MCNC: 233 MG/DL (ref 65–140)
HCT VFR BLD AUTO: 41.4 % (ref 36.5–49.3)
HGB BLD-MCNC: 13.5 G/DL (ref 12–17)
MCH RBC QN AUTO: 30.8 PG (ref 26.8–34.3)
MCHC RBC AUTO-ENTMCNC: 32.6 G/DL (ref 31.4–37.4)
MCV RBC AUTO: 95 FL (ref 82–98)
NRBC BLD AUTO-RTO: 0 /100 WBCS
PLATELET # BLD AUTO: 204 THOUSANDS/UL (ref 149–390)
PMV BLD AUTO: 9 FL (ref 8.9–12.7)
POTASSIUM SERPL-SCNC: 4.1 MMOL/L (ref 3.5–5.3)
QRS AXIS: 88 DEGREES
QRSD INTERVAL: 98 MS
QT INTERVAL: 390 MS
QTC INTERVAL: 438 MS
RBC # BLD AUTO: 4.38 MILLION/UL (ref 3.88–5.62)
SODIUM SERPL-SCNC: 133 MMOL/L (ref 135–147)
T WAVE AXIS: 54 DEGREES
VENTRICULAR RATE: 76 BPM
WBC # BLD AUTO: 20.64 THOUSAND/UL (ref 4.31–10.16)

## 2024-08-26 PROCEDURE — 97535 SELF CARE MNGMENT TRAINING: CPT

## 2024-08-26 PROCEDURE — 85027 COMPLETE CBC AUTOMATED: CPT

## 2024-08-26 PROCEDURE — 93010 ELECTROCARDIOGRAM REPORT: CPT | Performed by: INTERNAL MEDICINE

## 2024-08-26 PROCEDURE — 99239 HOSP IP/OBS DSCHRG MGMT >30: CPT | Performed by: INTERNAL MEDICINE

## 2024-08-26 PROCEDURE — 94760 N-INVAS EAR/PLS OXIMETRY 1: CPT

## 2024-08-26 PROCEDURE — 94640 AIRWAY INHALATION TREATMENT: CPT

## 2024-08-26 PROCEDURE — 80048 BASIC METABOLIC PNL TOTAL CA: CPT

## 2024-08-26 PROCEDURE — 82948 REAGENT STRIP/BLOOD GLUCOSE: CPT

## 2024-08-26 PROCEDURE — 99232 SBSQ HOSP IP/OBS MODERATE 35: CPT | Performed by: PSYCHIATRY & NEUROLOGY

## 2024-08-26 RX ORDER — POLYETHYLENE GLYCOL 3350 17 G/17G
17 POWDER, FOR SOLUTION ORAL DAILY
Start: 2024-08-27

## 2024-08-26 RX ORDER — ATORVASTATIN CALCIUM 20 MG/1
20 TABLET, FILM COATED ORAL EVERY EVENING
Start: 2024-08-26

## 2024-08-26 RX ORDER — SENNOSIDES 8.6 MG
8.6 TABLET ORAL
Start: 2024-08-26

## 2024-08-26 RX ORDER — ASPIRIN 81 MG/1
81 TABLET, CHEWABLE ORAL DAILY
Start: 2024-08-27

## 2024-08-26 RX ORDER — ACETAMINOPHEN 325 MG/1
650 TABLET ORAL EVERY 6 HOURS SCHEDULED
Start: 2024-08-26

## 2024-08-26 RX ORDER — ALBUTEROL SULFATE 90 UG/1
2 AEROSOL, METERED RESPIRATORY (INHALATION) EVERY 4 HOURS PRN
Status: DISCONTINUED | OUTPATIENT
Start: 2024-08-26 | End: 2024-08-26 | Stop reason: HOSPADM

## 2024-08-26 RX ORDER — SPIRONOLACTONE 25 MG/1
25 TABLET ORAL DAILY
Status: DISCONTINUED | OUTPATIENT
Start: 2024-08-26 | End: 2024-08-26 | Stop reason: HOSPADM

## 2024-08-26 RX ORDER — BENZONATATE 100 MG/1
100 CAPSULE ORAL 3 TIMES DAILY PRN
Status: DISCONTINUED | OUTPATIENT
Start: 2024-08-26 | End: 2024-08-26

## 2024-08-26 RX ORDER — BENZONATATE 100 MG/1
100 CAPSULE ORAL 3 TIMES DAILY
Status: DISCONTINUED | OUTPATIENT
Start: 2024-08-26 | End: 2024-08-26 | Stop reason: HOSPADM

## 2024-08-26 RX ORDER — OXYCODONE HYDROCHLORIDE 5 MG/1
5 TABLET ORAL EVERY 4 HOURS PRN
Qty: 18 TABLET | Refills: 0 | Status: SHIPPED | OUTPATIENT
Start: 2024-08-26 | End: 2024-08-29

## 2024-08-26 RX ORDER — ACETAMINOPHEN 325 MG/1
325 TABLET ORAL EVERY 6 HOURS PRN
Start: 2024-08-26

## 2024-08-26 RX ORDER — IPRATROPIUM BROMIDE AND ALBUTEROL SULFATE 2.5; .5 MG/3ML; MG/3ML
3 SOLUTION RESPIRATORY (INHALATION)
Status: DISCONTINUED | OUTPATIENT
Start: 2024-08-26 | End: 2024-08-26 | Stop reason: HOSPADM

## 2024-08-26 RX ORDER — GUAIFENESIN 600 MG/1
600 TABLET, EXTENDED RELEASE ORAL EVERY 12 HOURS SCHEDULED
Status: DISCONTINUED | OUTPATIENT
Start: 2024-08-26 | End: 2024-08-26 | Stop reason: HOSPADM

## 2024-08-26 RX ORDER — METHOCARBAMOL 500 MG/1
500 TABLET, FILM COATED ORAL ONCE AS NEEDED
Status: COMPLETED | OUTPATIENT
Start: 2024-08-26 | End: 2024-08-26

## 2024-08-26 RX ORDER — DOCUSATE SODIUM 100 MG/1
100 CAPSULE, LIQUID FILLED ORAL 2 TIMES DAILY
Start: 2024-08-26

## 2024-08-26 RX ORDER — ATORVASTATIN CALCIUM 20 MG/1
20 TABLET, FILM COATED ORAL EVERY EVENING
Status: DISCONTINUED | OUTPATIENT
Start: 2024-08-26 | End: 2024-08-26 | Stop reason: HOSPADM

## 2024-08-26 RX ADMIN — OXYCODONE HYDROCHLORIDE 5 MG: 5 TABLET ORAL at 04:17

## 2024-08-26 RX ADMIN — ALBUTEROL SULFATE 2 PUFF: 90 AEROSOL, METERED RESPIRATORY (INHALATION) at 12:08

## 2024-08-26 RX ADMIN — TAMSULOSIN HYDROCHLORIDE 0.8 MG: 0.4 CAPSULE ORAL at 17:07

## 2024-08-26 RX ADMIN — OXYCODONE HYDROCHLORIDE 5 MG: 5 TABLET ORAL at 13:50

## 2024-08-26 RX ADMIN — INSULIN GLARGINE 50 UNITS: 100 INJECTION, SOLUTION SUBCUTANEOUS at 08:55

## 2024-08-26 RX ADMIN — METHOCARBAMOL TABLETS 500 MG: 500 TABLET, COATED ORAL at 17:07

## 2024-08-26 RX ADMIN — SODIUM CHLORIDE 1 G: 1 TABLET ORAL at 12:13

## 2024-08-26 RX ADMIN — OXYCODONE HYDROCHLORIDE 5 MG: 5 TABLET ORAL at 18:00

## 2024-08-26 RX ADMIN — FLUTICASONE FUROATE AND VILANTEROL TRIFENATATE 1 PUFF: 100; 25 POWDER RESPIRATORY (INHALATION) at 08:46

## 2024-08-26 RX ADMIN — ACETAMINOPHEN 975 MG: 325 TABLET ORAL at 17:07

## 2024-08-26 RX ADMIN — GUAIFENESIN 600 MG: 600 TABLET, EXTENDED RELEASE ORAL at 12:13

## 2024-08-26 RX ADMIN — POLYETHYLENE GLYCOL 3350 17 G: 17 POWDER, FOR SOLUTION ORAL at 08:55

## 2024-08-26 RX ADMIN — INSULIN LISPRO 1 UNITS: 100 INJECTION, SOLUTION INTRAVENOUS; SUBCUTANEOUS at 08:40

## 2024-08-26 RX ADMIN — DIGOXIN 250 MCG: 125 TABLET ORAL at 08:55

## 2024-08-26 RX ADMIN — IPRATROPIUM BROMIDE AND ALBUTEROL SULFATE 3 ML: .5; 3 SOLUTION RESPIRATORY (INHALATION) at 09:44

## 2024-08-26 RX ADMIN — INSULIN LISPRO 15 UNITS: 100 INJECTION, SOLUTION INTRAVENOUS; SUBCUTANEOUS at 12:07

## 2024-08-26 RX ADMIN — APIXABAN 5 MG: 5 TABLET, FILM COATED ORAL at 08:56

## 2024-08-26 RX ADMIN — GABAPENTIN 800 MG: 400 CAPSULE ORAL at 17:07

## 2024-08-26 RX ADMIN — DOCUSATE SODIUM 100 MG: 100 CAPSULE, LIQUID FILLED ORAL at 08:56

## 2024-08-26 RX ADMIN — UMECLIDINIUM 1 PUFF: 62.5 AEROSOL, POWDER ORAL at 08:46

## 2024-08-26 RX ADMIN — LOSARTAN POTASSIUM 50 MG: 50 TABLET, FILM COATED ORAL at 08:56

## 2024-08-26 RX ADMIN — GABAPENTIN 800 MG: 400 CAPSULE ORAL at 08:55

## 2024-08-26 RX ADMIN — ACETAMINOPHEN 975 MG: 325 TABLET ORAL at 08:55

## 2024-08-26 RX ADMIN — METOPROLOL SUCCINATE 200 MG: 100 TABLET, EXTENDED RELEASE ORAL at 08:56

## 2024-08-26 RX ADMIN — BUSPIRONE HYDROCHLORIDE 10 MG: 10 TABLET ORAL at 17:07

## 2024-08-26 RX ADMIN — SPIRONOLACTONE 25 MG: 25 TABLET ORAL at 09:47

## 2024-08-26 RX ADMIN — BENZONATATE 100 MG: 100 CAPSULE ORAL at 17:07

## 2024-08-26 RX ADMIN — IPRATROPIUM BROMIDE AND ALBUTEROL SULFATE 3 ML: .5; 3 SOLUTION RESPIRATORY (INHALATION) at 05:28

## 2024-08-26 RX ADMIN — INSULIN LISPRO 15 UNITS: 100 INJECTION, SOLUTION INTRAVENOUS; SUBCUTANEOUS at 08:41

## 2024-08-26 RX ADMIN — ATORVASTATIN CALCIUM 20 MG: 20 TABLET, FILM COATED ORAL at 17:07

## 2024-08-26 RX ADMIN — IPRATROPIUM BROMIDE AND ALBUTEROL SULFATE 3 ML: .5; 3 SOLUTION RESPIRATORY (INHALATION) at 13:06

## 2024-08-26 RX ADMIN — ASPIRIN 81 MG CHEWABLE TABLET 81 MG: 81 TABLET CHEWABLE at 08:55

## 2024-08-26 RX ADMIN — APIXABAN 5 MG: 5 TABLET, FILM COATED ORAL at 17:07

## 2024-08-26 RX ADMIN — DOCUSATE SODIUM 100 MG: 100 CAPSULE, LIQUID FILLED ORAL at 17:07

## 2024-08-26 RX ADMIN — INSULIN LISPRO 15 UNITS: 100 INJECTION, SOLUTION INTRAVENOUS; SUBCUTANEOUS at 17:09

## 2024-08-26 RX ADMIN — INSULIN LISPRO 1 UNITS: 100 INJECTION, SOLUTION INTRAVENOUS; SUBCUTANEOUS at 17:08

## 2024-08-26 RX ADMIN — LIDOCAINE 2 PATCH: 50 PATCH CUTANEOUS at 08:54

## 2024-08-26 RX ADMIN — GABAPENTIN 800 MG: 400 CAPSULE ORAL at 12:13

## 2024-08-26 RX ADMIN — OXYCODONE HYDROCHLORIDE 5 MG: 5 TABLET ORAL at 09:47

## 2024-08-26 RX ADMIN — BUSPIRONE HYDROCHLORIDE 10 MG: 10 TABLET ORAL at 08:55

## 2024-08-26 RX ADMIN — BENZONATATE 100 MG: 100 CAPSULE ORAL at 12:13

## 2024-08-26 NOTE — PROGRESS NOTES
Patient:  JAIR BRYANT    MRN:  [id number]    Aidin Request ID:  [id number]    Level of care reserved:  Skilled Nursing Facility    Partner Reserved:  [facility], [place] [address] [phone]    Clinical needs requested:    Geography searched:  10 miles around [id number]    Start of Service:    Request sent:  10:19am EDT on 8/26/2024 by Aleah Olguin    Partner reserved:  1:03pm EDT on 8/26/2024 by Aleah Olguin    Choice list shared:  11:16am EDT on 8/26/2024 by Aleah Olguin
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
## 2024-08-26 NOTE — CASE MANAGEMENT
Case Management Discharge Planning Note    Patient name Alonzo Watson  Location 2 [facility]  [facility] [address] A MRN [id number]  : 1960 Date 2024       Current Admission Date: 2024  Current Admission Diagnosis:Stroke-like symptom   Patient Active Problem List    Diagnosis Date Noted Date Diagnosed    Altered mental status 2024     Dysarthria 2024     Constipation 2024     Stroke-like symptom 2024     Leukocytosis 2024     Urinary retention 2024     Sciatica of left side 2024     Muscle spasms of both lower extremities 2024     Food insecurity 2024     Chronic intractable pain 04/15/2024     Gastroparesis 2024     Irritable bowel syndrome with diarrhea 2024     Hx of adenomatous colonic polyps 2024     IgG deficiency (HCC) 2023     Unspecified lack of coordination 10/25/2023     Other symbolic dysfunctions 10/25/2023     Other abnormalities of gait and mobility 10/25/2023     Diffuse pain 2023     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) 2023     Acute sensory neuropathy 2023     Allergies 2023     Hepatosplenomegaly 2023     Ambulatory dysfunction 2023     Immunodeficiency, unspecified (HCC) 2022     CLL (chronic lymphocytic leukemia) ([facility]) 2022     Hypo-osmolality and hyponatremia 2022     Chronic lymphocytic leukemia of B-cell type in remission (HCC) 2022     Chronic obstructive pulmonary disease, unspecified ([facility]) 2022     Chronic pain syndrome 2022     Foraminal stenosis of lumbar region 2022     Lumbar post-laminectomy syndrome 2022     Lumbar radiculopathy 2022     SIRS (systemic inflammatory response syndrome) (HCC) 2022     Dysuria 2021     Other intervertebral disc degeneration, lumbar region 2021     Bipolar disorder ([facility]) 2021     Paroxysmal atrial fibrillation ([facility]) 2021      Chronic respiratory failure with hypoxia (HCC) 11/28/2021     Chronic kidney disease, stage 2 (mild) 11/28/2021     Atherosclerotic heart disease of native coronary artery without angina pectoris 11/28/2021     Peripheral neuropathy 11/26/2021     Muscle weakness (generalized) 09/21/2021     Platelets decreased (HCC) 08/06/2021     Heart failure (HCC) 11/17/2020     Nutritional anemia, unspecified  10/29/2020     Chest pain 10/11/2020     Transition of care performed with sharing of clinical summary 04/11/2020     Obstructive sleep apnea 02/02/2020     Chronic a-fib (HCC) 02/02/2020     Type 2 diabetes mellitus with complication, with long-term current use of insulin ([facility]) 06/21/2019     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 ([facility]) 03/25/2019     Lung disease, restrictive 11/21/2018     Chronic respiratory failure (HCC) 10/31/2018     Coronary artery disease 09/06/2017     Esophageal reflux 09/06/2017     Chronic low back pain 11/30/2016     SHAVONNE and COPD overlap syndrome       Morbid obesity       Fatigue 09/02/2016     Hyponatremia 09/02/2016     Psychiatric disorder      Erectile dysfunction of organic origin 08/25/2014     Essential hypertension 09/04/2012     Diabetic neuropathy ([facility]) 09/04/2012     Panic disorder without agoraphobia 09/04/2012       LOS (days): 0  Geometric Mean LOS (GMLOS) (days):   Days to GMLOS:     OBJECTIVE:     Current admission status: Observation   Preferred Pharmacy:   [address] [address]  Phone: [phone] Fax: [phone]    Primary Care Provider: MANAS Payne    Primary Insurance: [employer]  Secondary Insurance:     DISCHARGE DETAILS:    CM contacted family/caregiver?: Yes    Contacts  Patient Contacts: Brandi Caro (friend/POA)  Relationship to Patient:: Friend  Contact Method: Phone  Phone Number: [phone]  Reason/Outcome:  Discharge Planning    Other Referral/Resources/Interventions Provided:  Interventions: Short Term Rehab  Referral Comments: Discharge ordered.  Insurance authorization approval received by CM Discharge Support.  Pt will be transferred to [facility] [place] for skilled rehab.  [facility] transport was arranged due to need for oxygen (pt unable to get his POC from home).  Pt aware of plan and asked that his friend, Brandi, be called.  SW called friend and discussed discharge plan and timeframe.  RN and facility also aware of plan.    Treatment Team Recommendation: Short Term Rehab  Discharge Destination Plan:: Short Term Rehab  Transport at Discharge : [facility]  Dispatcher Contacted: Yes  Number/Name of Dispatcher: Roundtrip  Transported by (Company and Unit #): [employer]  ETA of Transport (Date): 08/26/24  ETA of Transport (Time): 1815          Accepting Facility Name, City & State : [facility] [place], [place]  Receiving Facility/Agency Phone Number: [phone]

## 2024-08-26 NOTE — NURSING NOTE
Rec'd pt stable in bed via stretcher. Pt oriented to unit and room. Call bell at bedside and within pt reach. Bed in lowest position. Assessment done at bedside. Vitals obtained - wdl. Pt denies any pain or discomfort at this time. Pt denies any questions or concerns at this time. Will continue to monitor.

## 2024-08-26 NOTE — OCCUPATIONAL THERAPY NOTE
Occupational Therapy Progress Note     Patient Name: Alonzo Watson  Today's Date: 8/26/2024  Problem List  Principal Problem:    Stroke-like symptom  Active Problems:    Hyponatremia    Essential hypertension    Diabetic neuropathy (HCC)    Chronic respiratory failure (HCC)    Type 2 diabetes mellitus with complication, with long-term current use of insulin (HCC)    Obstructive sleep apnea    Chronic a-fib (HCC)    Heart failure (HCC)    SIRS (systemic inflammatory response syndrome) (HCC)    CLL (chronic lymphocytic leukemia) (HCC)    Bipolar disorder (HCC)    Chronic obstructive pulmonary disease, unspecified (HCC)    Diffuse pain    Urinary retention    Altered mental status    Dysarthria    Constipation       08/26/24 1533   OT Last Visit   OT Visit Date 08/26/24  (Monday)   Note Type   Note Type Treatment  (tx session [id number])   Pain Assessment   Pain Assessment Tool 0-10   Pain Score No Pain   Restrictions/Precautions   Weight Bearing Precautions Per Order No   Other Precautions Chair Alarm;Bed Alarm;O2;Multiple lines;Fall Risk  (catheter, O2, James)   Lifestyle   Autonomy Pt reports 3L O2 at baseline and use of RW vs scooter   Reciprocal Relationships Pt reports living alone in apt   Service to Others Pt reports retired and worked in    Intrinsic Gratification Pt reports enjoying writing / reading. 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   ADL   Where Assessed Edge of bed  (vs OOB in chair at end of session)   Eating Assistance 6  Modified independent   Eating Deficit Setup   Eating Comments seated OOB in chair after set- up   Grooming Assistance 5  Supervision/Setup   Grooming Deficit Setup;Supervision/safety;Increased time to complete   Grooming Comments seated after set- up   UB Dressing Assistance 5  Supervision/Setup   UB Dressing Deficit Setup;Supervision/safety;Increased time to complete   UB Dressing Comments seated at EOB   LB Dressing  "Assistance 5  Supervision/Setup   LB Dressing Deficit Setup;Increased time to complete;Supervision/safety   LB Dressing Comments seated at EOB to don slippers after set- up   Toileting Assistance  Unable to assess  (denied need to void)   Bed Mobility   Rolling L 5  Supervision   Additional items Assist x 1;Bedrails;Increased time required   Supine to Sit 4  Minimal assistance   Additional items Assist x 1;Increased time required;Verbal cues;Bedrails  (to pt's L)   Sit to Supine Unable to assess   Additional Comments Pt seated OOB in chair at end of session w/ needs met, call bell in reach and chair alarm activated   Transfers   Sit to Stand 4  Minimal assistance   Additional items Assist x 1;Increased time required;Verbal cues   Stand to Sit 4  Minimal assistance   Additional items Assist x 1;Increased time required   Functional Mobility   Functional Mobility 4  Minimal assistance   Additional Comments few feet from EOB to bedside recliner chair   Additional items Rolling walker   Subjective   Subjective \"I am doing better. I can remember now but slowly\"   Cognition   Overall Cognitive Status Impaired   Arousal/Participation Alert;Cooperative   Attention Attends with cues to redirect   Orientation Level Oriented to person;Oriented to place;Oriented to situation   Memory   (appears WFL during conversation)   Following Commands Follows multistep commands with increased time or repetition   Comments Identified pt by full name and birthdate. Alert, able to follow directions, communicate wants / needs w/ + time.   Activity Tolerance   Activity Tolerance Patient tolerated treatment well   Medical Staff Made Aware spoke w/ RN, Ivonne   Assessment   Assessment Pt seen for skilled OT tx session focusing on activity engagement, challenging activity tolerance. Pt agreeable and motivated to participate reporting he would like to get out of bed to chair. Pt required min A to complete bed mobility, min A sit <> stand and use of " RW for short distance functional mobility. Pt engaged in UBD/LBD while seated w/ S. Continue to recommend level II rehab resource intensity when medically stable for discharge from acute care. Will continue to follow   Plan   Treatment Interventions ADL retraining;Functional transfer training;Endurance training;UE strengthening/ROM;Patient/family training;Equipment evaluation/education;Compensatory technique education;Continued evaluation;Energy conservation;Activityengagement   Goal Expiration Date 09/08/24   OT Treatment Day 2  (Monday 8/26/24)   OT Frequency 3-5x/wk   Discharge Recommendation   Rehab Resource Intensity Level, OT II (Moderate Resource Intensity)   AM-PAC Daily Activity Inpatient   Lower Body Dressing 3   Bathing 3   Toileting 2   Upper Body Dressing 3   Grooming 4   Eating 4   Daily Activity Raw Score 19   Daily Activity Standardized Score (Calc for Raw Score >=11) 40.22   AM-PAC Applied Cognition Inpatient   Following a Speech/Presentation 3   Understanding Ordinary Conversation 4   Taking Medications 3   Remembering Where Things Are Placed or Put Away 4   Remembering List of 4-5 Errands 3   Taking Care of Complicated Tasks 3   Applied Cognition Raw Score 20   Applied Cognition Standardized Score 41.76   Barthel Index   Feeding 10   Bathing 0   Grooming Score 5   Dressing Score 5   Bladder Score 0   Bowels Score 10   Toilet Use Score 5   Transfers (Bed/Chair) Score 10   Mobility (Level Surface) Score 0   Stairs Score 0   Barthel Index Score 45   End of Consult   Education Provided Yes   Patient Position at End of Consult Bedside chair;Bed/Chair alarm activated;All needs within reach   Nurse Communication Nurse aware of consult   Licensure   [place] License Number  Misa Plummer, OTR/L [id number]          The patient's raw score on the AM-PAC Daily Activity Inpatient Short Form is 19. A raw score of greater than or equal to 19 suggests the patient may benefit from discharge to home. Please  refer to the recommendation of the Occupational Therapist for safe discharge planning.      Misa Plummer, OTR/L  [id number]  [id number]

## 2024-08-26 NOTE — DISCHARGE SUMMARY
Discharge Summary - [facility]     Patient Information: Alonzo Watson 63 y.o. male MRN: [id number]  Unit/Bed#: 2 [place] [address] A Encounter: [id number]     Admitting Physician: Kita Hurtado MD  Discharging Physician/Practitioner: Kita Hurtado MD   PCP: MANAS Payne  Admission Date:   Admission Orders (From admission, onward)       Ordered        08/24/24 1423  Place in Observation  Once                          Discharge Date: 08/26/24      Reason for Admission: Hyponatremia [E87.1]  Altered mental status [R41.82]  Family history of CLL (chronic lymphoid leukemia) [Z80.6]  Stroke-like symptom [R29.90]  Altered mental status, unspecified altered mental status type [R41.82]     Discharge Diagnoses:      Principal Problem:    Stroke-like symptom  Active Problems:    Chronic respiratory failure (HCC)    SIRS (systemic inflammatory response syndrome) (HCC)    Hyponatremia    Essential hypertension    Diabetic neuropathy (HCC)    Type 2 diabetes mellitus with complication, with long-term current use of insulin (HCC)    Obstructive sleep apnea    Chronic a-fib (HCC)    Heart failure (HCC)    CLL (chronic lymphocytic leukemia) (HCC)    Bipolar disorder (HCC)    Chronic obstructive pulmonary disease, unspecified (HCC)    Diffuse pain    Urinary retention    Altered mental status    Dysarthria    Constipation  Resolved Problems:    * No resolved hospital problems. *       Consultations During Hospital Stay:  ·       Neuro PT OT     Procedures Performed:   ·       None     Significant Findings / Test Results:   ·      8/26 corrected Sodium WNL, WBC 20.6  8/25: Na136, WBC 21.9,   8/24: Na 128, Glc 215, , Lac 2.7 --> 1.5, WBC 32.11, Procalc < 0.05  8/22: Na 132, Glc 183, WBC 21.38    Echo EF 57%  CTA chest/abd/pelv: No acute findings in the chest, abdomen or pelvis.  CTA head/neck: No large vessel occlusion. Mild carotid atherosclerotic disease.  CT brain: No acute  "intracranial abnormality.   CT head: No acute intracranial abnormality. Mild chronic microangiopathic ischemic changes.   CXR: No acute cardiopulmonary disease.  MRSA neg  BC1 no growth 24hr  Ucx mixed contaminant     Incidental Findings:   ·       None     Test Results Pending at Discharge (will require follow up):   ·       None     Outpatient Tests Requested:  ·       BMP 8/28/24 or 8/29/24     Outpatient follow-up Requested:  ·       PCP    Complications:  None    Things to address at first visit after hospitalization   Is your pain controlled?  Are you still having stroke-like symptoms?  How is your shortness of breath?  Are you falling?  Do you need further assistance to prevent future falls?  Do you want to review your advanced directives?  How have your blood pressures been?    Hospital Course:   Alonzo Watson is a 63 y.o. male patient who originally presented to the hospital on 8/24/2024 due to stroke-like symptoms (see below for original HPI). Stroke alert was called. CT head showed no acute intracranial abnormality. CTA head and neck showed no large vessel occlusion. No hemodynamically significant stenosis of carotid bifurcations. No acute CTA dissection findings. He was started on the stroke pathway. He took aspirin, lipitor. Over his hospital course his pain stayed mostly stable though the pain was very severe. He was given tylenol, oxycodone, gabapentin for pain. He also had cough and shortness of breath this morning that resolved after his duoneb treatment this morning. No fevers for the last 24 hours.     HPI per H&P Admission Note:   \"Alonzo Watson is a 63 y.o. male w/ PMH of HTN, T2DM w/ long-term insulin use and neuropathy, SHAVONNE, chronic A-fib, HFpEF w/ EF 57%, CLL, COPD, urinary retention, and Bipolar disorder who presents with pain everywhere and slurred speech. Patient was recently admitted to the ED on 08/22/24 for slurred speech after taking his Oxycodone and Baclofen together. Patient states " "he has been in a lot of back pain and has been having spasms. He was taking his morning meds today but didn't get to take them all because he was in extreme pain for which he called EMS. He also hasn't hadn't eaten since the night before. CT head and CTA negative for any acute intracranial pathology in the ED. Patient was admitted for stroke-like sxs.  Patient admits SOB (increasing over the past few days that isn't responding to inhalers)\"  Patient denied fever, chills, n/v/d      Condition at Discharge: good      Discharge Day Visit / Exam:      Vitals: Blood Pressure: 166/92 (08/26/24 1616)  Pulse: 84 (08/26/24 1616)  Temperature: 97.6 °F (36.4 °C) (08/26/24 1616)  Temp Source: Temporal (08/25/24 0700)  Respirations: 18 (08/26/24 1616)  Height: 5' 11\" (180.3 cm) (08/24/24 1620)  Weight - Scale: 107 kg (235 lb 3.7 oz) (08/25/24 0454)  SpO2: 99 % (08/26/24 1616)  Exam:   Physical Exam  See progress note from today, same day of discharge, 8/26/24     Discussion with Family:   Patient Information Sharing: With the consent of Alonzo Watson, tried to call Brandi and left voicemail     Discharge instructions/Information to patient and family:   See after visit summary for information provided to patient and family.       Discharge Medications:     Medication List      START taking these medications     aspirin 81 mg chewable tablet; Chew 1 tablet (81 mg total) daily; Start   taking on: August 27, 2024   atorvastatin 20 mg tablet; Commonly known as: LIPITOR; Take 1 tablet (20   mg total) by mouth every evening   docusate sodium 100 mg capsule; Commonly known as: COLACE; Take 1   capsule (100 mg total) by mouth 2 (two) times a day   polyethylene glycol 17 g packet; Commonly known as: MIRALAX; Take 17 g   by mouth daily; Start taking on: August 27, 2024   senna 8.6 mg; Commonly known as: SENOKOT; Take 1 tablet (8.6 mg total)   by mouth daily at bedtime     CONTINUE taking these medications     acetaminophen 325 mg tablet; " Commonly known as: TYLENOL; Take 2 tablets   (650 mg total) by mouth every 6 (six) hours as needed for mild pain,   headaches or fever   Alcohol Prep 70 % Pads; Apply topically 4 (four) times a day As directed   apixaban 5 mg; Commonly known as: Eliquis; Take 1 tablet (5 mg total) by   mouth 2 (two) times a day   benzonatate 100 mg capsule; Commonly known as: TESSALON PERLES; Take 1   capsule (100 mg total) by mouth 3 (three) times a day as needed for cough   bumetanide 1 mg tablet; Commonly known as: BUMEX; Take 1 tablet (1 mg   total) by mouth daily   busPIRone 10 mg tablet; Commonly known as: BUSPAR; TAKE ONE TABLET BY   MOUTH TWICE DAILY   CENTRUM SILVER 50+MEN PO   * [place] Choice Comfort EZ 33G X 4 MM Misc; Generic drug: Insulin Pen   Needle   * [place] Choice Comfort EZ 33G X 4 MM Misc; Generic drug: Insulin Pen   Needle; USE TO INJECT INSULIN 5 TIMES A DAY   * B-D ULTRAFINE III SHORT PEN 31G X 8 MM Misc; Generic drug: Insulin Pen   Needle   * Unifine SafeControl Pen Needle 30G X 5 MM Misc; Generic drug: Insulin   Pen Needle; Inject under the skin 5 (five) times a day 5 times a day use   * Insulin Pen Needle 31G X 5 MM Misc; Inject under the skin 4 (four)   times a day   digoxin 0.25 mg tablet; Commonly known as: LANOXIN; TAKE 1 TABLET DAILY   FreeStyle Sheba 2 Purdy Shante; Check blood sugars multiple times per day   FreeStyle Sheba 2 Sensor Misc; CHECK BLOOD SUGARS MULTIPLE TIMES DAILY   gabapentin 800 mg tablet; Commonly known as: NEURONTIN; TAKE 1 TABLET BY   MOUTH 4 TIMES A DAY   guaiFENesin 600 mg 12 hr tablet; Commonly known as: MUCINEX; Take 1   tablet (600 mg total) by mouth 2 (two) times a day as needed for cough   HumaLOG KwikPen 200 units/mL CONCENTRATED U-200 injection pen; Generic   drug: insuln lispro; Inject 15 Units under the skin 3 (three) times a day   with meals   Insulin Glargine Solostar 100 UNIT/ML Sopn; Commonly known as: Lantus   SoloStar; Inject 0.5 mL (50 Units total) under the skin 2  (two) times a   day   lamoTRIgine 25 mg tablet; Commonly known as: LaMICtal   lidocaine 5 %; Commonly known as: LIDODERM; Apply 2 patches topically   over 12 hours daily Remove & Discard patch within 12 hours or as directed   by MD   losartan 50 mg tablet; Commonly known as: COZAAR; Take 1 tablet (50 mg   total) by mouth daily   metoprolol succinate 200 MG 24 hr tablet; Commonly known as: TOPROL-XL;   Take 1 tablet (200 mg total) by mouth daily   naloxone 4 mg/0.1 mL nasal spray; Commonly known as: NARCAN; Administer   1 spray into a nostril. If no response after 2-3 minutes, give another   dose in the other nostril using a new spray.   oxyCODONE-acetaminophen 5-325 mg per tablet; Commonly known as:   Percocet; Take 2 tablets by mouth every 8 (eight) hours as needed for   moderate pain Max Daily Amount: 6 tablets   QUEtiapine 300 mg tablet; Commonly known as: SEROquel   sertraline 50 mg tablet; Commonly known as: ZOLOFT; Take 1 tablet (50 mg   total) by mouth daily Daily at bedtime   sodium chloride 1 g tablet; Take 1 tablet (1 g total) by mouth every   morning   spironolactone 25 mg tablet; Commonly known as: ALDACTONE; TAKE 1 TABLET   BY MOUTH DAILY   tamsulosin 0.4 mg; Commonly known as: FLOMAX; Take 2 capsules (0.8 mg   total) by mouth daily with dinner   Trelegy Ellipta 100-62.5-25 MCG/ACT inhaler; Generic drug:   fluticasone-umeclidinium-vilanterol; Inhale 1 puff daily Rinse mouth after   use.   * Ventolin  (90 Base) MCG/ACT inhaler; Generic drug: albuterol;   INHALE 2 PUFFS EVERY 6 (SIX) HOURS AS NEEDED FOR WHEEZING   * albuterol (2.5 mg/3 mL) 0.083 % nebulizer solution; USE 1 VIAL (2.5 MG   TOTAL) BY NEBULIZATION EVERY 6 HOURS AS NEEDED FOR WHEEZING  * This list has 7 medication(s) that are the same as other medications   prescribed for you. Read the directions carefully, and ask your doctor or   other care provider to review them with you.     STOP taking these medications     ipratropium-albuterol  0.5-2.5 mg/3 mL nebulizer solution; Commonly known   as: DUO-NEB   predniSONE 20 mg tablet   tiZANidine 4 mg tablet; Commonly known as: ZANAFLEX        Disposition:   Other: Complete Care at [place]          Discharge Statement:  I spent 3-minutes discharging the patient. This time was spent on the day of discharge. I had direct contact with the patient on the day of discharge. Greater than 50% of the total time was spent examining patient, answering all patient questions, arranging and discussing plan of care with patient as well as directly providing post-discharge instructions.  Additional time then spent on discharge activities.     ** Please Note: This note has been constructed using a voice recognition system **     Michelle Salinas MD   08/26/24  4:30 PM

## 2024-08-26 NOTE — CASE MANAGEMENT
Case Management Discharge Planning Note    Patient name Alonzo Watson  Location 2 [facility]  [facility] A MRN [id number]  : 1960 Date 2024       Current Admission Date: 2024  Current Admission Diagnosis:Stroke-like symptom   Patient Active Problem List    Diagnosis Date Noted Date Diagnosed    Altered mental status 2024     Dysarthria 2024     Constipation 2024     Stroke-like symptom 2024     Leukocytosis 2024     Urinary retention 2024     Sciatica of left side 2024     Muscle spasms of both lower extremities 2024     Food insecurity 2024     Chronic intractable pain 04/15/2024     Gastroparesis 2024     Irritable bowel syndrome with diarrhea 2024     Hx of adenomatous colonic polyps 2024     IgG deficiency (HCC) 2023     Unspecified lack of coordination 10/25/2023     Other symbolic dysfunctions 10/25/2023     Other abnormalities of gait and mobility 10/25/2023     Diffuse pain 2023     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) 2023     Acute sensory neuropathy 2023     Allergies 2023     Hepatosplenomegaly 2023     Ambulatory dysfunction 2023     Immunodeficiency, unspecified (HCC) 2022     CLL (chronic lymphocytic leukemia) ([facility]) 2022     Hypo-osmolality and hyponatremia 2022     Chronic lymphocytic leukemia of B-cell type in remission (HCC) 2022     Chronic obstructive pulmonary disease, unspecified ([facility]) 2022     Chronic pain syndrome 2022     Foraminal stenosis of lumbar region 2022     Lumbar post-laminectomy syndrome 2022     Lumbar radiculopathy 2022     SIRS (systemic inflammatory response syndrome) (HCC) 2022     Dysuria 2021     Other intervertebral disc degeneration, lumbar region 2021     Bipolar disorder ([facility]) 2021     Paroxysmal atrial fibrillation ([facility]) 2021      Chronic respiratory failure with hypoxia (HCC) 11/28/2021     Chronic kidney disease, stage 2 (mild) 11/28/2021     Atherosclerotic heart disease of native coronary artery without angina pectoris 11/28/2021     Peripheral neuropathy 11/26/2021     Muscle weakness (generalized) 09/21/2021     Platelets decreased (HCC) 08/06/2021     Heart failure ([facility]) 11/17/2020     Nutritional anemia, unspecified  10/29/2020     Chest pain 10/11/2020     Transition of care performed with sharing of clinical summary 04/11/2020     Obstructive sleep apnea 02/02/2020     Chronic a-fib ([facility]) 02/02/2020     Type 2 diabetes mellitus with complication, with long-term current use of insulin ([facility]) 06/21/2019     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 ([facility]) 03/25/2019     Lung disease, restrictive 11/21/2018     Chronic respiratory failure ([facility]) 10/31/2018     Coronary artery disease 09/06/2017     Esophageal reflux 09/06/2017     Chronic low back pain 11/30/2016     SHAVONNE and COPD overlap syndrome       Morbid obesity       Fatigue 09/02/2016     Hyponatremia 09/02/2016     Psychiatric disorder      Erectile dysfunction of organic origin 08/25/2014     Essential hypertension 09/04/2012     Diabetic neuropathy ([facility]) 09/04/2012     Panic disorder without agoraphobia 09/04/2012       LOS (days): 0  Geometric Mean LOS (GMLOS) (days):   Days to GMLOS:     OBJECTIVE:            Current admission status: Observation   Preferred Pharmacy:   [facility] [address] [address]  Phone: [phone] Fax: [phone]    Primary Care Provider: MANAS Payne    Primary Insurance: [email or web address]  Secondary Insurance:     DISCHARGE DETAILS:                                                                                                               Facility Insurance Auth Number: [id number]

## 2024-08-26 NOTE — PLAN OF CARE
Problem: Prexisting or High Potential for Compromised Skin Integrity  Goal: Skin integrity is maintained or improved  Description: INTERVENTIONS:  - Identify patients at risk for skin breakdown  - Assess and monitor skin integrity  - Assess and monitor nutrition and hydration status  - Monitor labs   - Assess for incontinence   - Turn and reposition patient  - Assist with mobility/ambulation  - Relieve pressure over bony prominences  - Avoid friction and shearing  - Provide appropriate hygiene as needed including keeping skin clean and dry  - Evaluate need for skin moisturizer/barrier cream  - Collaborate with interdisciplinary team   - Patient/family teaching  - Consider wound care consult   Outcome: Progressing     Problem: RESPIRATORY - ADULT  Goal: Achieves optimal ventilation and oxygenation  Description: INTERVENTIONS:  - Assess for changes in respiratory status  - Assess for changes in mentation and behavior  - Position to facilitate oxygenation and minimize respiratory effort  - Oxygen administered by appropriate delivery if ordered  - Initiate smoking cessation education as indicated  - Encourage broncho-pulmonary hygiene including cough, deep breathe, Incentive Spirometry  - Assess the need for suctioning and aspirate as needed  - Assess and instruct to report SOB or any respiratory difficulty  - Respiratory Therapy support as indicated  Outcome: Progressing     Problem: Neurological Deficit  Goal: Neurological status is stable or improving  Description: Interventions:  - Monitor and assess patient's level of consciousness, motor function, sensory function, and level of assistance needed for ADLs.   - Monitor and report changes from baseline. Collaborate with interdisciplinary team to initiate plan and implement interventions as ordered.   - Provide and maintain a safe environment.  - Consider seizure precautions.  - Consider fall precautions.  - Consider aspiration precautions.  - Consider bleeding  precautions.  Outcome: Progressing     Problem: Activity Intolerance/Impaired Mobility  Goal: Mobility/activity is maintained at optimum level for patient  Description: Interventions:  - Assess and monitor patient  barriers to mobility and need for assistive/adaptive devices.  - Assess patient's emotional response to limitations.  - Collaborate with interdisciplinary team and initiate plans and interventions as ordered.  - Encourage independent activity per ability.  - Maintain proper body alignment.  - Perform active/passive rom as tolerated/ordered.  - Plan activities to conserve energy.  - Turn patient as appropriate  Outcome: Progressing     Problem: NEUROSENSORY - ADULT  Goal: Achieves stable or improved neurological status  Description: INTERVENTIONS  - Monitor and report changes in neurological status  - Monitor vital signs such as temperature, blood pressure, glucose, and any other labs ordered   - Initiate measures to prevent increased intracranial pressure  - Monitor for seizure activity and implement precautions if appropriate      Outcome: Progressing  Goal: Achieves maximal functionality and self care  Description: INTERVENTIONS  - Monitor swallowing and airway patency with patient fatigue and changes in neurological status  - Encourage and assist patient to increase activity and self care.   - Encourage visually impaired, hearing impaired and aphasic patients to use assistive/communication devices  Outcome: Progressing     Problem: Nutrition/Hydration-ADULT  Goal: Nutrient/Hydration intake appropriate for improving, restoring or maintaining nutritional needs  Description: Monitor and assess patient's nutrition/hydration status for malnutrition. Collaborate with interdisciplinary team and initiate plan and interventions as ordered.  Monitor patient's weight and dietary intake as ordered or per policy. Utilize nutrition screening tool and intervene as necessary. Determine patient's food preferences and  provide high-protein, high-caloric foods as appropriate.     INTERVENTIONS:  - Monitor oral intake, urinary output, labs, and treatment plans  - Assess nutrition and hydration status and recommend course of action  - Evaluate amount of meals eaten  - Assist patient with eating if necessary   - Allow adequate time for meals  - Recommend/ encourage appropriate diets, oral nutritional supplements, and vitamin/mineral supplements  - Order, calculate, and assess calorie counts as needed  - Recommend, monitor, and adjust tube feedings and TPN/PPN based on assessed needs  - Assess need for intravenous fluids  - Provide specific nutrition/hydration education as appropriate  - Include patient/family/caregiver in decisions related to nutrition  Outcome: Progressing

## 2024-08-26 NOTE — [place] UNIVERSAL TRANSFER FORM
"[place] UNIVERSAL TRANSFER FORM  (ALL ITEMS MUST BE COMPLETED)    1. TRANSFER FROM: [facility]      TRANSFER TO: [facility]    2. DATE OF TRANSFER: 8/26/2024                        TIME OF TRANSFER: 1815    3. PATIENT NAME: Alonzo Watson D      YOB: 1960                             GENDER: male    4. LANGUAGE:   English    5. PHYSICIAN NAME:  Kita Hurtado MD                   PHONE: [phone]    6. CODE STATUS: Level 3 - DNAR and DNI        Out of Hospital DNR Attached: No    7. :                                      :  Extended Emergency Contact Information  Primary Emergency Contact: Brandi Caro  Address: [address] [place] [address]  Mobile Phone: [phone]  Relation: Friend  Secondary Emergency Contact: Rahul Caro  Mobile Phone: [phone]  Relation: Friend           Health Care Representative/Proxy:  No           Legal Guardian:  No             NAME OF:           HEALTH CARE REPRESENTATIVE/PROXY:                                         OR           LEGAL GUARDIAN, IF NOT :                                               PHONE:  (Day)           (Night)                        (Cell)    8. REASON FOR TRANSFER: (Must include brief medical history and recent changes in physical function or cognition.) physical deconditioning and ambulatory dysfunction              V/S: /92   Pulse 84   Temp 97.6 °F (36.4 °C)   Resp 18   Ht 5' 11\" (1.803 m)   Wt 107 kg (235 lb 3.7 oz)   SpO2 99%   BMI 32.81 kg/m²           PAIN: Yes, Site generalized    9. PRIMARY DIAGNOSIS: Stroke-like symptom      Secondary Diagnosis:         Pacemaker: No      Internal Defib: No          Mental Health Diagnosis (if Applicable):    10. RESTRAINTS: No     11. RESPIRATORY NEEDS: CPAP    12. ISOLATION/PRECAUTION: None    13. ALLERGY: Wellbutrin [bupropion]    14. SENSORY:       Vision " Glasses    15. SKIN CONDITION: No Wounds    16. DIET: Special (describe)Cardiac    17. IV ACCESS: None    18. PERSONAL ITEMS SENT WITH PATIENT: Otherpants, shirt, footwear, glasses, cell phone    19. ATTACHED DOCUMENTS: MUST ATTACH CURRENT MEDICATION INFORMATION Face Sheet and Labs    20. AT RISK ALERTS:None        HARM TO: N/A    21. WEIGHT BEARING STATUS:         Left Leg: None        Right Leg: None    22. MENTAL STATUS:Alert    23. FUNCTION:        Walk: With Help        Transfer: With Help        Toilet: Self        Feed: Self    24. IMMUNIZATIONS/SCREENING:     Immunization History   Administered Date(s) Administered    COVID-19 J&J (Kindful) vaccine 0.5 mL 04/12/2021    COVID-19 MODERNA VACC 0.5 ML IM 01/24/2022, 01/24/2022    COVID-19 Moderna Vac BIVALENT 12 Yr+ IM 0.5 ML 09/29/2022, 09/29/2022    COVID-19 Moderna mRNA Vaccine 12 Yr+ 50 mcg/0.5 mL (Spikevax) 01/26/2024    COVID-19 PFIZER VACCINE 0.3 ML IM 09/29/2022    Hep B, adult 12/22/2008, 03/26/2009, 12/08/2009    INFLUENZA 10/03/2022, 10/12/2022    Influenza Quadrivalent Preservative Free 3 years and older IM 09/25/2017    Influenza, high dose seasonal 0.7 mL 11/26/2021    Influenza, injectable, quadrivalent, preservative free 0.5 mL 10/08/2018    Influenza, recombinant, quadrivalent,injectable, preservative free 10/12/2020, 11/26/2021    Influenza, seasonal, injectable 10/14/2003, 12/28/2004, 10/14/2005, 10/29/2007, 11/14/2008, 10/05/2009, 01/05/2011, 09/28/2011, 10/26/2012, 09/04/2013, 10/11/2014, 09/08/2015, 09/28/2016    MMR 12/04/2008, 03/26/2009    Meningococcal, Unknown Serogroups 12/22/2008    Pneumococcal Conjugate 13-Valent 09/08/2015, 11/29/2016, 11/29/2016    Pneumococcal Polysaccharide PPV23 10/14/2003, 10/14/2003    Tdap 12/04/2008    Tuberculin Skin Test 11/29/2021, 04/28/2022, 05/07/2022, 10/27/2023    Tuberculin Skin Test-PPD Intradermal 10/30/2007, 05/14/2009, 06/02/2009, 04/20/2010, 05/04/2010, 11/29/2021, 04/28/2022, 05/07/2022     Unknown 04/12/2021       25. BOWEL: Continent    26. BLADDER: Incontinent and Resendiz Catheter    27. SENDING FACILITY CONTACT: [facility]                  Title: RN        Unit: [address] [facility]        Phone: [phone]          REC'G FACILITY CONTACT (if known):        Title:        Unit:         Phone:         FORM PREFILLED BY (if applicable)       Title:       Unit:        Phone:         FORM COMPLETED BY Ivonne Mccullough RN      Title: RN      Phone: [phone]

## 2024-08-26 NOTE — PROGRESS NOTES
Daily Progress Note - [facility]  Family Medicine Residency  Alonzo Watson 63 y.o. male MRN: [id number]  Unit/Bed#: [address] [place] [address] A Encounter: [id number]  Admitting Physician: Kita Hurtado MD   PCP: MANAS Payne  Date of Admission:  8/24/2024 11:38 AM    Assessment and Plan    Chronic respiratory failure (HCC)  Assessment & Plan  Hx of COPD and chronic hypoxic respiratory failure, on 3 L of oxygen during the day and BiPAP with 3 L of oxygen at night.  Currently on baseline oxygen requirements.   New cough  F/u CXR  Continue to monitor SpO2  Continue supplemental oxygen  Continue BiPAP nightly  Respiratory protocol    * Stroke-like symptom  Assessment & Plan  TIA vs toxic metabolic encephalopathy    CT head: No acute intracranial abnormality   CTA head and neck: No large vessel occlusion. Mild carotid atherosclerotic disease. No hemodynamically significant stenosis of the carotid bifurcations  CTA dissection: No acute findings in the chest, abdomen or pelvis. Specifically, no acute aortic/arterial abnormality.    Started on stroke pathway.  Pt already had 162 mg ASA today    Patient had h/o taking Baclofen and Oxycodone together and having similar sxs. In last ED visit. Could be medication induced toxic metabolic encephalopathy.    Continue ASA 81 mg daily starting tomorrow  Start Lipitor 40 mg daily  F/u TSH, lipid panel, HbA1c  Will hold off of MRI for now and Echo was done 5 days ago (EF 57%)   Neurocheck, fall precaution, telemetry  Hold Baclofen  Continue pain regimen: Oxycodone 5 mg Q6 for moderate, 10 mg Q6 for severe pain; and Tylenol Q8 scheduled      SIRS (systemic inflammatory response syndrome) (HCC)  Assessment & Plan  Patient with CLL and history of leukocytosis. Follows up with hematology-oncology outpatient. Last visit was in April where he agreed to begin IVIG, insurance does not cover.     -POA: WBC 32.11, LA 2.7>2.4. Procal neg  Pt also has tachypnea tachycardia.    ED  course: Unasyn 3 g    Less likely infectious  Tachypnea most likely due to dehydration  Tachycardia most likely due to chronic afib  Leukocytosis is chronic        Will hold off of further Abx for now  Will repeat LA after fluid starts  NS  CC/hr for hyponatremia  Monitor vitals and WBC  Follow up outpatient heme/onc for further CLL management plan    Constipation  Assessment & Plan  Reporting abdominal pain    -bowel regimen    Urinary retention  Assessment & Plan  Uncontrolled diabetes with HbA1c 11.2. 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      Continue crocker usage   Urology f/u outpatient for urodynamic flex cystoscopy   Continue Flomax 0.8 mg daily    Chronic obstructive pulmonary disease, unspecified ([facility])  Assessment & Plan  Chronic     Follows up with pulmonology.  Home medications include Trelegy daily, DuoNebs and Ventolin as needed.      Continue home medications    Bipolar disorder ([facility])  Assessment & Plan  History of bipolar dx, depression with anxiety.  Home regimen: BUSPAR 10mg BID, Zoloft 50mg daily, Seroquel 300 mg daily at bedtime, and Lamictal 25 mg daily at bedtime.     Continue home medications     CLL (chronic lymphocytic leukemia) ([facility])  Assessment & Plan  History of CLL, follows with hematology oncology.  Patient was open to beginning IVIG treatment in April, but in insurance does not cover.    Chronic leukocytosis     Follow up with heme/onc outpatient    Heart failure ([facility])  Assessment & Plan  Wt Readings from Last 3 Encounters:   08/24/24 107 kg (235 lb 7.2 oz)   08/22/24 106 kg (234 lb)   08/20/24 107 kg (234 lb 12.8 oz)     Chronic; stable.     Home medications includes spironolactone 25 mg daily, digoxin 250 mcg daily metoprolol 200 mg daily and Bumex 1 mg as needed.  Last echo of 2024 showed EF 57% unable to assess diastolic function due to AFIB.     Continue home medications.   Continue home digoxin and metoprolol. Hold home Bumex and aldactone due to  Hyponatremia and dehydration.   Assessing volume status --> see hypernatremia labs  Patient appearing dehydrated therefore IV fluids started       Chronic a-fib ([facility])  Assessment & Plan  Chronic, stable. At home on Digoxin 0.25mcg PO daily & Metoprolol 200mg PO QD & Eliquis 5 mg BID.     -EKG: a-fib, no changes      Continue home medications  Digoxin level <0.3  On telemetry     Obstructive sleep apnea  Assessment & Plan  Chronic; uses BiPAP 12/5 at bedtime.     Continue BiPAP use at bedtime    Type 2 diabetes mellitus with complication, with long-term current use of insulin ([facility])  Assessment & Plan  Lab Results   Component Value Date    HGBA1C 11.2 (H) 08/19/2024       Recent Labs     08/22/24  1350 08/24/24  1143   POCGLU 186* 233*       Blood Sugar Average: Last 72 hrs:  (P) 233    Chronic, UNCONTROLLED. Prior A1c 9.7 on 4/2/24.     Home medication includes gabapentin 800 mg 4 times daily, glargine 50 units twice daily and lispro 15 units 3 times daily with meals.      Continue home medications   Diabetic diet   ISS  Continue home gabapentin    Diabetic neuropathy ([facility])  Assessment & Plan  Lab Results   Component Value Date    HGBA1C 11.2 (H) 08/19/2024       Recent Labs     08/22/24  1350 08/24/24  1143   POCGLU 186* 233*       Blood Sugar Average: Last 72 hrs:  (P) 233    Continue home gabapentin 800 mg 4 times daily.     Essential hypertension  Assessment & Plan  Chronic; stable     Home medication include Cozaar 50 mg daily and metoprolol 200 mg daily.      Continue home medication  Continue to monitor blood pressure  Consider adding spironolactone home med    Hyponatremia  Assessment & Plan  Acute on Chronic, baseline 134-135 corrected.  POA Na 126 - corrected 128.  Sodium 1g daily at home    Continue salt tablet as 1g TID  Will start NS IV fluid 100cc/hr  Will monitor sodium with Q6hr BMP with goal of corrected Sodium 133 by tomorrow  F/u urine sodium, urea, creatinine, osmolality  F/u serum uric acid,  osmolality        VTE Pharmacologic Prophylaxis: VTE Score: 6 on Eliquis    Patient Centered Rounds: bedside rounds        Patient Information Sharing: With the consent of Alonzo Watson , I'll call Brandi jameson    Time Spent for Care:  More than 50% of total time spent on counseling and coordination of care as described above.    Current Length of Stay: 0 day(s)    Current Patient Status: Observation   Certification Statement: The patient will continue to require additional inpatient hospital stay due to new onset SOB    Discharge Plan: 24-48 hours    Code Status: Level 3 - DNAR and DNI    Subjective:   Patient has new SOB/new cough, new left-sided pain. Weakness when he is conscious of the weakness. Otherwise no changed vision, chest pain, rectal bleeding. Pain is everywhere but stable except for the left-sided pain    Objective     Objective:   Vitals:   No data recorded.    HR:  [70-71] 70  Resp:  [16-19] 16  BP: (157-166)/(77-81) 166/78  SpO2:  [98 %-100 %] 98 %  Body mass index is 32.81 kg/m².     Input and Output Summary (last 24 hours):       Intake/Output Summary (Last 24 hours) at 8/26/2024 0828  Last data filed at 8/25/2024 1801  Gross per 24 hour   Intake 1230 ml   Output 1900 ml   Net -670 ml       Physical Exam:   Physical Exam  Constitutional:       General: He is not in acute distress.     Appearance: Normal appearance. He is normal weight. He is not ill-appearing, toxic-appearing or diaphoretic.   HENT:      Right Ear: External ear normal.      Left Ear: External ear normal.      Nose: No congestion or rhinorrhea.   Eyes:      General:         Right eye: No discharge.         Left eye: No discharge.   Cardiovascular:      Rate and Rhythm: Normal rate and regular rhythm.      Heart sounds: Normal heart sounds. No murmur heard.     No friction rub. No gallop.   Pulmonary:      Effort: Pulmonary effort is normal. No respiratory distress.      Breath sounds: No stridor. No wheezing, rhonchi or rales.    Abdominal:      Tenderness: There is abdominal tenderness.   Musculoskeletal:      Cervical back: Normal range of motion. No rigidity.      Right lower leg: No edema.      Left lower leg: No edema.      Comments: Tenderness everywhere   Skin:     General: Skin is warm.   Neurological:      Mental Status: He is alert and oriented to person, place, and time.   Psychiatric:         Mood and Affect: Mood normal.         Behavior: Behavior normal.         Thought Content: Thought content normal.         Judgment: Judgment normal.           Additional Data:     Labs:  Results from last 7 days   Lab Units 08/26/24  0448 08/25/24  0621   WBC Thousand/uL 20.64* 21.93*   HEMOGLOBIN g/dL 13.5 12.5   HEMATOCRIT % 41.4 37.7   PLATELETS Thousands/uL 204 183   SEGS PCT %  --  15*   LYMPHO PCT %  --  81*   MONO PCT %  --  3*   EOS PCT %  --  1     Results from last 7 days   Lab Units 08/26/24  0448 08/25/24  0621   POTASSIUM mmol/L 4.1 4.2   CHLORIDE mmol/L 98 100   CO2 mmol/L 28 31   BUN mg/dL 12 8   CREATININE mg/dL 0.82 0.71   CALCIUM mg/dL 8.8 8.9   ALK PHOS U/L  --  51   ALT U/L  --  14   AST U/L  --  19     Results from last 7 days   Lab Units 08/24/24  1150   INR  0.90     Results from last 7 days   Lab Units 08/26/24  0719 08/25/24  1607 08/25/24  1114 08/25/24  0642 08/24/24  2022   POC GLUCOSE mg/dl 181* 85 111 162* 117     Results from last 7 days   Lab Units 08/24/24  1150   HEMOGLOBIN A1C % 10.4*       * I Have Reviewed All Lab Data Listed Above.  * Additional Pertinent Lab Tests Reviewed: All Labs For Current Hospital Admission Reviewed    Imaging:    Imaging Reports Reviewed Today Include: CTA stroke, CT dissection    Recent Cultures (last 7 days):     Results from last 7 days   Lab Units 08/24/24  1222   BLOOD CULTURE  No Growth at 24 hrs.       Last 24 Hours Medication List:   Current Facility-Administered Medications   Medication Dose Route Frequency Provider Last Rate    acetaminophen  650 mg Oral Q6H PRN  Chloe Onyeukwu, DO      acetaminophen  975 mg Oral Q8H [facility] Chuck Garcia MD      apixaban  5 mg Oral BID Chloe Onyeukwu, DO      aspirin  81 mg Oral Daily Chloe Onyeukwu, DO      atorvastatin  40 mg Oral QPM Chloe Onyeukwu, DO      busPIRone  10 mg Oral BID Chloe Onyeukwu, DO      digoxin  250 mcg Oral Daily Chloe Onyeukwu, DO      docusate sodium  100 mg Oral BID Chuck Garcia MD      Fluticasone Furoate-Vilanterol  1 puff Inhalation Daily Chloe Onyeukwu, DO      And    umeclidinium  1 puff Inhalation Daily Chloe Onyeukwu, DO      gabapentin  800 mg Oral 4x Daily Chuck Garcia MD      insulin glargine  50 Units Subcutaneous Q12H [facility] Chloe Onyeukwu, DO      insulin lispro  1-6 Units Subcutaneous 4x Daily (AC & HS) Chuck Garcia MD      insulin lispro  15 Units Subcutaneous TID With Meals Chuck Garcia MD      ipratropium-albuterol  3 mL Nebulization Q6H PRN Chloe Onyeukwu, DO      lamoTRIgine  25 mg Oral HS Chloe Onyeukwu, DO      lidocaine  2 patch Topical Daily Chloee Onyeukwu, DO      losartan  50 mg Oral Daily Chloe Onyeukwu, DO      metoprolol succinate  200 mg Oral Daily Chloee Onyeukwu, DO      naloxone  0.04 mg Intravenous Q1MIN PRN Chuck Garcia MD      oxyCODONE  5 mg Oral Q4H PRN Chuck Garcia MD      polyethylene glycol  17 g Oral Daily Chuck Garcia MD      QUEtiapine  300 mg Oral HS Chloe Onyeukwu, DO      senna  1 tablet Oral HS Chuck Garcia MD      sertraline  50 mg Oral HS Chloe Onyeukwu, DO      sodium chloride  1 g Oral Daily Before Lunch Chuck Garcia MD      tamsulosin  0.8 mg Oral Daily With Dinner Chloe Onyeukwu, DO               ** Please Note: Dictation voice to text software may have been used in the creation of this document. **    Michelle Salinas MD  08/26/24  8:28 AM

## 2024-08-26 NOTE — CASE MANAGEMENT
Case Management Assessment & Discharge Planning Note    Patient name Alonzo Watson  [facility] MRN [id number]  : 1960 Date 2024       Current Admission Date: 2024  Current Admission Diagnosis:Stroke-like symptom   Patient Active Problem List    Diagnosis Date Noted Date Diagnosed    Altered mental status 2024     Dysarthria 2024     Constipation 2024     Stroke-like symptom 2024     Leukocytosis 2024     Urinary retention 2024     Sciatica of left side 2024     Muscle spasms of both lower extremities 2024     Food insecurity 2024     Chronic intractable pain 04/15/2024     Gastroparesis 2024     Irritable bowel syndrome with diarrhea 2024     Hx of adenomatous colonic polyps 2024     IgG deficiency (HCC) 2023     Unspecified lack of coordination 10/25/2023     Other symbolic dysfunctions 10/25/2023     Other abnormalities of gait and mobility 10/25/2023     Diffuse pain 2023     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) 2023     Acute sensory neuropathy 2023     Allergies 2023     Hepatosplenomegaly 2023     Ambulatory dysfunction 2023     Immunodeficiency, unspecified (HCC) 2022     CLL (chronic lymphocytic leukemia) ([facility]) 2022     Hypo-osmolality and hyponatremia 2022     Chronic lymphocytic leukemia of B-cell type in remission (HCC) 2022     Chronic obstructive pulmonary disease, unspecified ([facility]) 2022     Chronic pain syndrome 2022     Foraminal stenosis of lumbar region 2022     Lumbar post-laminectomy syndrome 2022     Lumbar radiculopathy 2022     SIRS (systemic inflammatory response syndrome) (HCC) 2022     Dysuria 2021     Other intervertebral disc degeneration, lumbar region 2021     Bipolar disorder (HCC) 2021     Paroxysmal atrial fibrillation ([facility])  11/28/2021     Chronic respiratory failure with hypoxia (HCC) 11/28/2021     Chronic kidney disease, stage 2 (mild) 11/28/2021     Atherosclerotic heart disease of native coronary artery without angina pectoris 11/28/2021     Peripheral neuropathy 11/26/2021     Muscle weakness (generalized) 09/21/2021     Platelets decreased (HCC) 08/06/2021     Heart failure ([facility]) 11/17/2020     Nutritional anemia, unspecified  10/29/2020     Chest pain 10/11/2020     Transition of care performed with sharing of clinical summary 04/11/2020     Obstructive sleep apnea 02/02/2020     Chronic a-fib ([facility]) 02/02/2020     Type 2 diabetes mellitus with complication, with long-term current use of insulin ([facility]) 06/21/2019     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 ([facility]) 03/25/2019     Lung disease, restrictive 11/21/2018     Chronic respiratory failure ([facility]) 10/31/2018     Coronary artery disease 09/06/2017     Esophageal reflux 09/06/2017     Chronic low back pain 11/30/2016     SHAVONNE and COPD overlap syndrome       Morbid obesity       Fatigue 09/02/2016     Hyponatremia 09/02/2016     Psychiatric disorder      Erectile dysfunction of organic origin 08/25/2014     Essential hypertension 09/04/2012     Diabetic neuropathy ([facility]) 09/04/2012     Panic disorder without agoraphobia 09/04/2012       LOS (days): 0  Geometric Mean LOS (GMLOS) (days):   Days to GMLOS:     OBJECTIVE:     Current admission status: Observation  Referral Reason: Stroke    Preferred Pharmacy:   [address] [address]  Phone: [phone] Fax: [phone]    Primary Care Provider: MANAS Payne    Primary Insurance: silkfred  Secondary Insurance:     ASSESSMENT:  Active Health Care Proxies    There are no active Health Care Proxies on file.       Obs Notice Signed: 08/26/24 (Met with pt to explain Medicare Observation  Status Notice. Pt signed notice and copy given. Copy also placed in scan bin for chart.)    Readmission Root Cause  30 Day Readmission: No    Patient Information  Admitted from:: Home  Mental Status: Alert  During Assessment patient was accompanied by: Not accompanied during assessment  Assessment information provided by:: Patient  Primary Caregiver: Self  Support Systems: Home care staff  County of Residence: [place]  What city do you live in?: [place]  Home entry access options. Select all that apply.: Elevator  Type of Current Residence: Apartment  Floor Level: 2  Upon entering residence, is there a bedroom on the main floor (no further steps)?: Yes  Upon entering residence, is there a bathroom on the main floor (no further steps)?: Yes  Living Arrangements: Lives Alone    Activities of Daily Living Prior to Admission  Functional Status: Assistance  Completes ADLs independently?: No  Level of ADL dependence: Assistance  Ambulates independently?: Yes  Does patient use assisted devices?: Yes  Assisted Devices (DME) used: BiPAP, Home Oxygen concentrator, Portable Oxygen concentrator, Portable Oxygen tanks, Rollator, Other (Comment) (electric scooter)  DME Company Name (respiratory supplies): [employer]  O2 Rate(s): 3L  Does patient currently own DME?: Yes  What DME does the patient currently own?: BiPAP, Home Oxygen concentrator, Portable Oxygen concentrator, Portable Oxygen tanks, Rollator, Other (Comment) (Electric scooter)  Does patient have a history of Outpatient Therapy (PT/OT)?: No  Does the patient have a history of Short-Term Rehab?: Yes  Does patient have a history of HHC?: Yes  Does patient currently have HHC?: Yes    Current Home Health Care  Type of Current Home Care Services: Home health aide, Nurse visit, Home PT, Home OT  [facility]  Current Home Health Follow-Up Provider:: PCP    Patient Information Continued  Income Source: SSI/SSD  Does patient have  prescription coverage?: Yes  Does patient receive dialysis treatments?: No  Does patient have a history of substance abuse?: No  Does patient have a history of Mental Health Diagnosis?: Yes (Bipolar Disorder)  Is patient receiving treatment for mental health?: Yes    Means of Transportation  Means of Transport to Appts:: Other (Comment) (can use [employer])      Social Determinants of Health (SDOH)      Flowsheet Row Most Recent Value   Housing Stability    In the last 12 months, was there a time when you were not able to pay the mortgage or rent on time? N   In the past 12 months, how many times have you moved where you were living? 0   At any time in the past 12 months, were you homeless or living in a shelter (including now)? N   Transportation Needs    In the past 12 months, has lack of transportation kept you from medical appointments or from getting medications? no  [usually uses telehealth]   In the past 12 months, has lack of transportation kept you from meetings, work, or from getting things needed for daily living? No   Food Insecurity    Within the past 12 months, you worried that your food would run out before you got the money to buy more. Never true   Within the past 12 months, the food you bought just didn't last and you didn't have money to get more. Never true   Utilities    In the past 12 months has the electric, gas, oil, or water company threatened to shut off services in your home? Yes            DISCHARGE DETAILS:    Discharge planning discussed with:: Patient  Freedom of Choice: Yes  Comments - Freedom of Choice: MANSI met with pt to assess needs and discuss plans.  PT/OT evaluated pt at Level II (Moderate Resource Intensity) and recommended continued therapy.  MANSI reviewed recommendations with pt and pt requested placement at [facility] [place] for rehab.  Referral was made and pt was accepted.  Facility will be reserved as requested by pt.  Authorization request will be submitted.  MANSI  will follow to monitor progress and assist as needed.  CM contacted family/caregiver?: No- see comments (per pt sister is a resident at same facility)  Were Treatment Team discharge recommendations reviewed with patient/caregiver?: Yes  Did patient/caregiver verbalize understanding of patient care needs?: Yes    Requested Home Health Care         Is the patient interested in HHC at discharge?: No    DME Referral Provided  Referral made for DME?: No    Other Referral/Resources/Interventions Provided:  Interventions: Short Term Rehab  Referral Comments: STR placement planned at [facility] [place]. Information for authorization request was sent to CM Discharge Support for submission.    Treatment Team Recommendation: Short Term Rehab  Discharge Destination Plan:: Short Term Rehab  Transport at Discharge : Wheelchair van (if pt has POC with him)

## 2024-08-26 NOTE — CASE MANAGEMENT
[facility] received request for authorization from Care Manager.  Authorization request submitted for: SNF  Facility Name: [facility]  NPI: [id number]  Facility MD:  Dr. Lindsey NPI: [id number]  Authorization initiated by contacting insurance:  [employer]  Via: [employer] Portal   Clinicals submitted via [employer] attachment   Pending Reference #: [id number]  ID: [id number]     Care Manager notified: Aleah Olguin    Updates to authorization status will be noted in chart. Please reach out to CM for updates on any clinical information.

## 2024-08-26 NOTE — PLAN OF CARE
Problem: OCCUPATIONAL THERAPY ADULT  Goal: Performs self-care activities at highest level of function for planned discharge setting.  See evaluation for individualized goals.  Outcome: Progressing  Note: Limitation: Decreased ADL status, Decreased UE ROM, Decreased UE strength, Decreased Safe judgement during ADL, Decreased cognition, Decreased endurance, Decreased fine motor control, Decreased self-care trans, Decreased high-level ADLs, Mood limitation (decreased balance and mobility)  Prognosis: Fair  Assessment: Pt seen for skilled OT tx session focusing on activity engagement, challenging activity tolerance. Pt agreeable and motivated to participate reporting he would like to get out of bed to chair. Pt required min A to complete bed mobility, min A sit <> stand and use of RW for short distance functional mobility. Pt engaged in UBD/LBD while seated w/ S. Continue to recommend level II rehab resource intensity when medically stable for discharge from acute care. Will continue to follow     Rehab Resource Intensity Level, OT: II (Moderate Resource Intensity)

## 2024-08-26 NOTE — CASE MANAGEMENT
[facility] has received APPROVED authorization.  Insurance:   [employer]  Called in by Rep: Desiree MCKEON#  [phone]  Authorization received for: SNF  Facility: [facility]   Authorization #: [id number]   Start of Care: 8/26  Next Review Date: 9/3  Continued Stay Care Coordinator: Desiree MCKEON#  [phone]  Submit next review to: [phone]     Care Manager notified: Aleah Olguin    Please reach out to  for updates on any clinical information.

## 2024-08-27 ENCOUNTER — TRANSITIONAL CARE MANAGEMENT (OUTPATIENT)
Age: 64
End: 2024-08-27

## 2024-08-29 LAB — BACTERIA BLD CULT: NORMAL

## 2024-09-15 ENCOUNTER — TELEPHONE (OUTPATIENT)
Dept: OTHER | Facility: HOSPITAL | Age: 64
End: 2024-09-15

## 2024-09-15 NOTE — ASSESSMENT & PLAN NOTE
Right basilar pneumonia  Procalcitonin w/ > 80% reduction > currently off ABX   Had 10 total days of ABx finished w/ Rocephin w/ last dose today   > completed for procalcitonin trend of greater than 80% reduction  Continue incentive spirometry at home > reinforced w/ patient   Continue flutter valve therapy > reinforced w/ patient  D/c mucomyst English

## 2024-09-18 ENCOUNTER — TRANSITIONAL CARE MANAGEMENT (OUTPATIENT)
Age: 64
End: 2024-09-18

## 2024-09-18 DIAGNOSIS — M62.838 MUSCLE SPASMS OF BOTH LOWER EXTREMITIES: Primary | ICD-10-CM

## 2024-09-18 RX ORDER — TIZANIDINE 2 MG/1
2 TABLET ORAL EVERY 8 HOURS PRN
Qty: 90 TABLET | Refills: 2 | Status: SHIPPED | OUTPATIENT
Start: 2024-09-18

## 2024-09-18 NOTE — PROGRESS NOTES
Spoke to Alonzo Watson. He is returning home today from [facility]. He is requesting refill on Tizanidine for drop off from pharmacy tomorrow.

## 2024-09-19 ENCOUNTER — PATIENT OUTREACH (OUTPATIENT)
Age: 64
End: 2024-09-19

## 2024-09-19 ENCOUNTER — OFFICE VISIT (OUTPATIENT)
Age: 64
End: 2024-09-19

## 2024-09-19 VITALS
HEART RATE: 80 BPM | OXYGEN SATURATION: 94 % | SYSTOLIC BLOOD PRESSURE: 100 MMHG | RESPIRATION RATE: 16 BRPM | DIASTOLIC BLOOD PRESSURE: 60 MMHG | TEMPERATURE: 98 F

## 2024-09-19 DIAGNOSIS — I10 BENIGN ESSENTIAL HYPERTENSION: ICD-10-CM

## 2024-09-19 DIAGNOSIS — I50.9 CHRONIC CONGESTIVE HEART FAILURE, UNSPECIFIED HEART FAILURE TYPE (HCC): ICD-10-CM

## 2024-09-19 DIAGNOSIS — R26.2 AMBULATORY DYSFUNCTION: ICD-10-CM

## 2024-09-19 DIAGNOSIS — Z79.4 TYPE 2 DIABETES MELLITUS WITH COMPLICATION, WITH LONG-TERM CURRENT USE OF INSULIN (HCC): Primary | ICD-10-CM

## 2024-09-19 DIAGNOSIS — E11.42 DIABETIC POLYNEUROPATHY ASSOCIATED WITH TYPE 2 DIABETES MELLITUS (HCC): ICD-10-CM

## 2024-09-19 DIAGNOSIS — E11.8 TYPE 2 DIABETES MELLITUS WITH COMPLICATION, WITH LONG-TERM CURRENT USE OF INSULIN (HCC): ICD-10-CM

## 2024-09-19 DIAGNOSIS — R27.9 UNSPECIFIED LACK OF COORDINATION: ICD-10-CM

## 2024-09-19 DIAGNOSIS — Z79.4 TYPE 2 DIABETES MELLITUS WITH COMPLICATION, WITH LONG-TERM CURRENT USE OF INSULIN (HCC): ICD-10-CM

## 2024-09-19 DIAGNOSIS — I48.20 CHRONIC A-FIB (HCC): ICD-10-CM

## 2024-09-19 DIAGNOSIS — E66.2 MORBID (SEVERE) OBESITY WITH ALVEOLAR HYPOVENTILATION (HCC): ICD-10-CM

## 2024-09-19 DIAGNOSIS — J42 CHRONIC BRONCHITIS, UNSPECIFIED CHRONIC BRONCHITIS TYPE (HCC): ICD-10-CM

## 2024-09-19 DIAGNOSIS — F31.9 BIPOLAR AFFECTIVE DISORDER, REMISSION STATUS UNSPECIFIED (HCC): ICD-10-CM

## 2024-09-19 DIAGNOSIS — J44.9 OSA AND COPD OVERLAP SYNDROME (HCC): Chronic | ICD-10-CM

## 2024-09-19 DIAGNOSIS — I50.33 ACUTE ON CHRONIC DIASTOLIC (CONGESTIVE) HEART FAILURE (HCC): ICD-10-CM

## 2024-09-19 DIAGNOSIS — K59.00 CONSTIPATION, UNSPECIFIED CONSTIPATION TYPE: ICD-10-CM

## 2024-09-19 DIAGNOSIS — R33.9 URINARY RETENTION: ICD-10-CM

## 2024-09-19 DIAGNOSIS — M62.838 MUSCLE SPASMS OF BOTH LOWER EXTREMITIES: ICD-10-CM

## 2024-09-19 DIAGNOSIS — J96.11 CHRONIC RESPIRATORY FAILURE WITH HYPOXIA (HCC): ICD-10-CM

## 2024-09-19 DIAGNOSIS — G89.29 CHRONIC INTRACTABLE PAIN: ICD-10-CM

## 2024-09-19 DIAGNOSIS — G62.9 PERIPHERAL POLYNEUROPATHY: ICD-10-CM

## 2024-09-19 DIAGNOSIS — I50.33 ACUTE ON CHRONIC DIASTOLIC (CONGESTIVE) HEART FAILURE (HCC): Primary | ICD-10-CM

## 2024-09-19 DIAGNOSIS — E11.8 TYPE 2 DIABETES MELLITUS WITH COMPLICATION, WITH LONG-TERM CURRENT USE OF INSULIN (HCC): Primary | ICD-10-CM

## 2024-09-19 DIAGNOSIS — G47.33 OSA AND COPD OVERLAP SYNDROME (HCC): Chronic | ICD-10-CM

## 2024-09-19 PROBLEM — M54.32 SCIATICA OF LEFT SIDE: Status: RESOLVED | Noted: 2024-07-31 | Resolved: 2024-09-19

## 2024-09-19 PROBLEM — Z59.41 FOOD INSECURITY: Status: RESOLVED | Noted: 2024-07-16 | Resolved: 2024-09-19

## 2024-09-19 PROBLEM — I25.10 CORONARY ARTERY DISEASE INVOLVING NATIVE HEART: Status: RESOLVED | Noted: 2017-09-06 | Resolved: 2024-09-19

## 2024-09-19 PROBLEM — J96.10 CHRONIC RESPIRATORY FAILURE (HCC): Status: RESOLVED | Noted: 2018-10-31 | Resolved: 2024-09-19

## 2024-09-19 PROBLEM — M62.81 MUSCLE WEAKNESS (GENERALIZED): Status: RESOLVED | Noted: 2021-09-21 | Resolved: 2024-09-19

## 2024-09-19 PROBLEM — R29.90 STROKE-LIKE SYMPTOM: Status: RESOLVED | Noted: 2024-08-24 | Resolved: 2024-09-19

## 2024-09-19 PROBLEM — R52 DIFFUSE PAIN: Status: RESOLVED | Noted: 2023-08-24 | Resolved: 2024-09-19

## 2024-09-19 PROBLEM — Z80.6 FAMILY HISTORY OF CLL (CHRONIC LYMPHOID LEUKEMIA): Status: RESOLVED | Noted: 2024-08-26 | Resolved: 2024-09-19

## 2024-09-19 PROBLEM — K58.0 IRRITABLE BOWEL SYNDROME WITH DIARRHEA: Status: RESOLVED | Noted: 2024-03-14 | Resolved: 2024-09-19

## 2024-09-19 PROBLEM — R07.9 CHEST PAIN: Status: RESOLVED | Noted: 2020-10-11 | Resolved: 2024-09-19

## 2024-09-19 PROCEDURE — 99496 TRANSJ CARE MGMT HIGH F2F 7D: CPT | Performed by: NURSE PRACTITIONER

## 2024-09-19 RX ORDER — BUMETANIDE 1 MG/1
1 TABLET ORAL DAILY
Qty: 30 TABLET | Refills: 3 | Status: SHIPPED | OUTPATIENT
Start: 2024-09-19 | End: 2024-09-24

## 2024-09-19 RX ORDER — BLOOD SUGAR DIAGNOSTIC
STRIP MISCELLANEOUS
Qty: 400 EACH | Refills: 3 | Status: SHIPPED | OUTPATIENT
Start: 2024-09-19

## 2024-09-19 RX ORDER — QUETIAPINE FUMARATE 300 MG/1
300 TABLET, FILM COATED ORAL
Qty: 90 TABLET | Refills: 1 | Status: SHIPPED | OUTPATIENT
Start: 2024-09-19

## 2024-09-19 RX ORDER — QUETIAPINE FUMARATE 100 MG/1
100 TABLET, FILM COATED ORAL DAILY
Qty: 90 TABLET | Refills: 1 | Status: SHIPPED | OUTPATIENT
Start: 2024-09-19

## 2024-09-19 RX ORDER — TAMSULOSIN HYDROCHLORIDE 0.4 MG/1
0.8 CAPSULE ORAL
Qty: 60 CAPSULE | Refills: 3 | Status: SHIPPED | OUTPATIENT
Start: 2024-09-19 | End: 2024-09-27 | Stop reason: SDUPTHER

## 2024-09-19 RX ORDER — LANCETS 33 GAUGE
EACH MISCELLANEOUS
Qty: 400 EACH | Refills: 3 | Status: SHIPPED | OUTPATIENT
Start: 2024-09-19

## 2024-09-19 RX ORDER — BLOOD-GLUCOSE METER
KIT MISCELLANEOUS
Qty: 1 KIT | Refills: 0 | Status: SHIPPED | OUTPATIENT
Start: 2024-09-19

## 2024-09-19 RX ORDER — DOCUSATE SODIUM 100 MG/1
100 CAPSULE, LIQUID FILLED ORAL 2 TIMES DAILY PRN
Qty: 90 CAPSULE | Refills: 1 | Status: SHIPPED | OUTPATIENT
Start: 2024-09-19

## 2024-09-19 NOTE — ASSESSMENT & PLAN NOTE
Orders:    docusate sodium (COLACE) 100 mg capsule; Take 1 capsule (100 mg total) by mouth 2 (two) times a day as needed for constipation

## 2024-09-19 NOTE — ASSESSMENT & PLAN NOTE
Lab Results   Component Value Date    HGBA1C 10.4 (H) 08/24/2024     -continue with gabapentin for neuropathy management

## 2024-09-19 NOTE — ASSESSMENT & PLAN NOTE
Wt Readings from Last 3 Encounters:   08/25/24 107 kg (235 lb 3.7 oz)   08/22/24 106 kg (234 lb)   08/20/24 107 kg (234 lb 12.8 oz)     -restart Bumex 1mg po daily as foot edema is present  -continue aldactone at 25mg po daily

## 2024-09-19 NOTE — ASSESSMENT & PLAN NOTE
Orders:    QUEtiapine (SEROquel) 300 mg tablet; Take 1 tablet (300 mg total) by mouth daily at bedtime    QUEtiapine (SEROquel) 100 mg tablet; Take 1 tablet (100 mg total) by mouth in the morning  -keep appointment via zoom with psychiatrist for reported end of month

## 2024-09-19 NOTE — PROGRESS NOTES
Diabetic Foot Exam    Patient's shoes and socks removed.    Right Foot/Ankle   Right Foot Inspection  Skin Exam: skin normal and skin intact. No dry skin, no warmth, no callus, no erythema, no maceration, no abnormal color, no pre-ulcer, no ulcer and no callus.     Toe Exam: ROM and strength within normal limits and swelling.     Sensory   Monofilament testing: intact    Vascular  The right DP pulse is 2+. The right PT pulse is 2+.     Left Foot/Ankle  Left Foot Inspection  Skin Exam: skin normal and skin intact. No dry skin, no warmth, no erythema, no maceration, normal color, no pre-ulcer, no ulcer and no callus.     Toe Exam: ROM and strength within normal limits and swelling.     Sensory   Monofilament testing: intact    Vascular  The left DP pulse is 2+. The left PT pulse is 2+.     Assign Risk Category  No deformity present  No loss of protective sensation  No weak pulses  Risk: 0  Transition of Care Visit  Name: Alonzo Watson      : 1960      MRN: [id number]  Encounter Provider: MANAS Payne  Encounter Date: 2024   Encounter department: [facility]    Assessment & Plan  Type 2 diabetes mellitus with complication, with long-term current use of insulin ([facility])    Lab Results   Component Value Date    HGBA1C 10.4 (H) 2024     -continue with using 24 hour glucose monitoring when it arrives  -continue insulin as ordered and report elevated BG > 400 to office  -reeducated on diabetic diet but he is non compliant with avoiding sweets  Orders:    Blood Glucose Monitoring Suppl (OneTouch Verio Reflect) w/Device KIT; Check blood sugars four times daily. Please substitute with appropriate alternative as covered by patient's insurance. Dx: E11.65    glucose blood (OneTouch Verio) test strip; Check blood sugars four times daily. Please substitute with appropriate alternative as covered by patient's insurance. Dx: E11.65    OneTouch Delica Lancets 33G MISC;  Check blood sugars four times daily. Please substitute with appropriate alternative as covered by patient's insurance. Dx: E11.65    Diabetic polyneuropathy associated with type 2 diabetes mellitus (HCC)    Lab Results   Component Value Date    HGBA1C 10.4 (H) 08/24/2024     -continue with gabapentin for neuropathy management    Acute on chronic diastolic (congestive) heart failure (HCC)  Wt Readings from Last 3 Encounters:   08/25/24 107 kg (235 lb 3.7 oz)   08/22/24 106 kg (234 lb)   08/20/24 107 kg (234 lb 12.8 oz)     -restart Bumex 1mg po daily as foot edema is present  -continue aldactone at 25mg po daily               Essential hypertension  -continue losartan 50 mg po daily and monitor BP frequently       Chronic a-fib (HCC)  -ARR today at 80b/min  -continue medication management         SHAVONNE and COPD overlap syndrome   -monitor sat's and wear 02 at 3L with short removal times if sat maintained above 90%         Peripheral polyneuropathy  -continue gabapentin  -educated to monitor skin integrity of feet daily as he insists on being barefoot at home-follow with psychiatrist          Urinary retention  -report weakness in urine stream to office and/or foul smelling urine and UTI symptoms  -crocker removed in rehab    Orders:    tamsulosin (FLOMAX) 0.4 mg; Take 2 capsules (0.8 mg total) by mouth daily with dinner    Bipolar affective disorder, remission status unspecified ([facility])    Orders:    QUEtiapine (SEROquel) 300 mg tablet; Take 1 tablet (300 mg total) by mouth daily at bedtime    QUEtiapine (SEROquel) 100 mg tablet; Take 1 tablet (100 mg total) by mouth in the morning  -keep appointment via zoom with psychiatrist for reported end of month  Chronic congestive heart failure, unspecified heart failure type ([facility])  Wt Readings from Last 3 Encounters:   08/25/24 107 kg (235 lb 3.7 oz)   08/22/24 106 kg (234 lb)   08/20/24 107 kg (234 lb 12.8 oz)               Orders:    bumetanide (BUMEX) 1 mg tablet; Take 1 tablet  (1 mg total) by mouth daily    Constipation, unspecified constipation type    Orders:    docusate sodium (COLACE) 100 mg capsule; Take 1 capsule (100 mg total) by mouth 2 (two) times a day as needed for constipation         History of Present Illness     Transitional Care Management Review:   Alonzo Watson is a 63 y.o. male here for TCM follow up.     During the TCM phone call patient stated:  TCM Call       Date and time call was made  9/19/2024 11:44 AM    Hospital care reviewed  Records reviewed    Patient was hospitialized at  Other (comment)    Comment  d/c from nursing home 9/18/24    Date of Admission  08/24/24    Date of discharge  08/26/24    Diagnosis  stroke-like symptoms    Disposition  Nursing Home    Were the patients medications reviewed and updated  Yes    Current Symptoms  None    Cough Severity  Mild    Shortness of breath severity  Mild    Chest pain severity  Mild    Chest pain onset  Ongoing    Back pain, left side, severity  Moderate    Fatigue severity  Mild          TCM Call       Post hospital issues  None    Should patient be enrolled in anticoag monitoring?  Yes    Scheduled for follow up?  Yes    Not clinically warranted  Skilled Nursing Facility at [facility] [place]    Patients specialists  Urologist; Other (comment)    Cardiologist name  dr de la garza    Pulmonologist name  DR PETERSON    Urologist name  Aminta    Other specialists names  Hemetologist    Did you obtain your prescribed medications  Yes    Why were you unable to obtain your medications  HE WILL P/U TODAY    Do you need help managing your prescriptions or medications  No    Is transportation to your appointment needed  Yes  home visit    Specify why  home visit    I have advised the patient to call PCP with any new or worsening symptoms  Leighann Jung LPN    Living Arrangements  Alone    Support System  [place]-based; Family; Friends    The type of support provided  Emotional    Do you have social support  Yes, as much  as I need    Are you recieving any outpatient services  No    What type of services  [facility]    Are you recieving home care services  Yes    Types of home care services  Nurse visit; Home PT; Other (comment)    Comment  occupational therapy , home aide for personal    Are you using any community resources  No    Current waiver services  No    Have you fallen in the last 12 months  Yes    How many times  1 with out injuruy    Interperter language line needed  No    Counseling  Patient    Comments  Scheduled for a home visit with Tuesday NP 8/23/24          Alonzo Watson is a 62yo homebound patient due to 02 use, poor gait and denies transportation out of home to concerns of not being able to manage outside appointments.  He was hospitalized from 8-18 to 8-20-24 due to visual hallucinations and neck and back tenderness.  Alonzo felt as if he was having a stroke at the time.  He was subsequently transferred to [facility] for rehabilitation and returned home yesterday on 9-19-24.  Questions upon hospital discharge are as follows:    How is your back/neck pain? Patient states improved with pain medication he previously took at home  Did you go to the spinal center? Refuses to go due to homebound  Are you going to PT/OT? Will request in home therapy   Did you go for Urology follow-up? Refuses due to homebound and lizzette dc'd in rehab and voiding freely  How often are you using your Inhalers? He is taking the trelegy he had at home with breakthrough inhalers  How are your blood sugars?Awaiting delivery of free style kamran and requesting glucometer-blood sugars fluctuate-does not follow at this time with endocrinology    -patient will have to wait until end of month to follow up with psychiatry by zoom so will refill seroquel temporarily  -patient follows up with oncology every 3 months via phone  -patient has a HHA through the [facility] program for home assist  -patient is in a clean safe environment with supportive  neighbors  -      Review of Systems   Constitutional: Negative.    HENT: Negative.     Eyes: Negative.    Respiratory: Negative.     Cardiovascular: Negative.    Gastrointestinal: Negative.    Endocrine: Negative.    Genitourinary: Negative.    Musculoskeletal: Negative.    Skin: Negative.    Allergic/Immunologic: Negative.    Neurological: Negative.    Hematological: Negative.    Psychiatric/Behavioral: Negative.       Objective     /60   Pulse 80   Temp 98 °F (36.7 °C)   Resp 16   SpO2 94%     Physical Exam  Constitutional:       General: He is not in acute distress.     Appearance: Normal appearance. He is obese. He is not ill-appearing, toxic-appearing or diaphoretic.   HENT:      Head: Normocephalic and atraumatic.      Right Ear: External ear normal.      Left Ear: External ear normal.      Nose: No congestion.      Mouth/Throat:      Mouth: Mucous membranes are moist.   Eyes:      General:         Right eye: No discharge.         Left eye: No discharge.      Conjunctiva/sclera: Conjunctivae normal.   Cardiovascular:      Rate and Rhythm: Normal rate and regular rhythm.      Pulses: no weak pulses.           Dorsalis pedis pulses are 2+ on the right side and 2+ on the left side.        Posterior tibial pulses are 2+ on the right side and 2+ on the left side.      Heart sounds: Normal heart sounds.   Pulmonary:      Effort: Pulmonary effort is normal. No respiratory distress.      Breath sounds: Normal breath sounds. No wheezing, rhonchi or rales.   Abdominal:      General: Bowel sounds are normal.      Palpations: Abdomen is soft.      Tenderness: There is no abdominal tenderness. There is no guarding.   Musculoskeletal:      Cervical back: Normal range of motion. No rigidity.      Right lower leg: Edema present.      Left lower leg: Edema present.      Comments: Edema B/L feet   Feet:      Right foot:      Skin integrity: No ulcer, skin breakdown, erythema, warmth, callus or dry skin.      Left  foot:      Skin integrity: No ulcer, skin breakdown, erythema, warmth, callus or dry skin.   Lymphadenopathy:      Cervical: No cervical adenopathy.   Skin:     General: Skin is warm and dry.      Comments: diaphoretic   Neurological:      General: No focal deficit present.      Mental Status: He is alert and oriented to person, place, and time.      Gait: Gait abnormal.   Psychiatric:         Mood and Affect: Mood normal.         Behavior: Behavior normal.         Thought Content: Thought content normal.         Judgment: Judgment normal.       Medications have been reviewed by provider in current encounter    Administrative Statements

## 2024-09-19 NOTE — ASSESSMENT & PLAN NOTE
-continue gabapentin  -educated to monitor skin integrity of feet daily as he insists on being barefoot at home-follow with psychiatrist

## 2024-09-19 NOTE — ASSESSMENT & PLAN NOTE
-report weakness in urine stream to office and/or foul smelling urine and UTI symptoms  -crocker removed in rehab    Orders:    tamsulosin (FLOMAX) 0.4 mg; Take 2 capsules (0.8 mg total) by mouth daily with dinner

## 2024-09-19 NOTE — ASSESSMENT & PLAN NOTE
Lab Results   Component Value Date    HGBA1C 10.4 (H) 08/24/2024     -continue with using 24 hour glucose monitoring when it arrives  -continue insulin as ordered and report elevated BG > 400 to office  -reeducated on diabetic diet but he is non compliant with avoiding sweets  Orders:    Blood Glucose Monitoring Suppl (OneTouch Verio Reflect) w/Device KIT; Check blood sugars four times daily. Please substitute with appropriate alternative as covered by patient's insurance. Dx: E11.65    glucose blood (OneTouch Verio) test strip; Check blood sugars four times daily. Please substitute with appropriate alternative as covered by patient's insurance. Dx: E11.65    OneTouch Delica Lancets 33G MISC; Check blood sugars four times daily. Please substitute with appropriate alternative as covered by patient's insurance. Dx: E11.65

## 2024-09-20 ENCOUNTER — TELEPHONE (OUTPATIENT)
Age: 64
End: 2024-09-20

## 2024-09-20 NOTE — PROGRESS NOTES
Received call from Tuesday [place] CRNP . Tuesday is requesting that orders be placed for home care through [facility] and PT.     Orders placed as requested.

## 2024-09-20 NOTE — TELEPHONE ENCOUNTER
Ivelisse from [facility] called with patient on line to schedule a in person mobility test to know if he qualifies for the power chair. They need to know appt date and time for patient, I adv Tuesday will reach out to patient to schedule. If you have any questions you can call a rep at [phone] Ref # [id number] .   Please Review,  Thank You

## 2024-09-24 ENCOUNTER — IN HOME VISIT (OUTPATIENT)
Age: 64
End: 2024-09-24

## 2024-09-24 VITALS
BODY MASS INDEX: 33.6 KG/M2 | SYSTOLIC BLOOD PRESSURE: 127 MMHG | TEMPERATURE: 98.2 F | WEIGHT: 240 LBS | OXYGEN SATURATION: 98 % | HEIGHT: 71 IN | HEART RATE: 60 BPM | RESPIRATION RATE: 18 BRPM | DIASTOLIC BLOOD PRESSURE: 68 MMHG

## 2024-09-24 DIAGNOSIS — E66.811 OBESITY (BMI 30.0-34.9): ICD-10-CM

## 2024-09-24 DIAGNOSIS — Z79.4 TYPE 2 DIABETES MELLITUS WITH COMPLICATION, WITH LONG-TERM CURRENT USE OF INSULIN (HCC): ICD-10-CM

## 2024-09-24 DIAGNOSIS — I50.33 ACUTE ON CHRONIC DIASTOLIC (CONGESTIVE) HEART FAILURE (HCC): Primary | ICD-10-CM

## 2024-09-24 DIAGNOSIS — E11.8 TYPE 2 DIABETES MELLITUS WITH COMPLICATION, WITH LONG-TERM CURRENT USE OF INSULIN (HCC): ICD-10-CM

## 2024-09-24 DIAGNOSIS — R26.2 AMBULATORY DYSFUNCTION: ICD-10-CM

## 2024-09-24 DIAGNOSIS — I50.9 CHRONIC CONGESTIVE HEART FAILURE, UNSPECIFIED HEART FAILURE TYPE (HCC): ICD-10-CM

## 2024-09-24 DIAGNOSIS — J42 CHRONIC BRONCHITIS, UNSPECIFIED CHRONIC BRONCHITIS TYPE (HCC): ICD-10-CM

## 2024-09-24 DIAGNOSIS — E66.9 OBESITY (BMI 30.0-34.9): ICD-10-CM

## 2024-09-24 DIAGNOSIS — G89.29 CHRONIC INTRACTABLE PAIN: ICD-10-CM

## 2024-09-24 PROBLEM — R41.82 ALTERED MENTAL STATUS: Status: RESOLVED | Noted: 2024-08-25 | Resolved: 2024-09-24

## 2024-09-24 PROBLEM — E66.2 CLASS 3 OBESITY WITH ALVEOLAR HYPOVENTILATION AND SERIOUS COMORBIDITY IN ADULT (HCC): Status: RESOLVED | Noted: 2019-03-25 | Resolved: 2024-09-24

## 2024-09-24 PROBLEM — E66.813 CLASS 3 OBESITY WITH ALVEOLAR HYPOVENTILATION AND SERIOUS COMORBIDITY IN ADULT (HCC): Status: RESOLVED | Noted: 2019-03-25 | Resolved: 2024-09-24

## 2024-09-24 PROCEDURE — 99349 HOME/RES VST EST MOD MDM 40: CPT | Performed by: NURSE PRACTITIONER

## 2024-09-24 NOTE — PROGRESS NOTES
Assessment/Plan:      Diagnoses and all orders for this visit:    Acute on chronic diastolic (congestive) heart failure (HCC)  - patient has been taking spironolactone and not taking bumex as prescribed.   - will d/c spironolactone   - start bumex 1 mg PO two times daily   - will follow-up to ensure edema of lower extremities decreases  - faint crackles present, continue O2 use   - edema b/l LE     Ambulatory dysfunction  - requires replacement power mobility device   - RUE 1/5; LUE 1/5, RLE 2/5, LLE 2/5  - ROM - able to operate scooter   - gait pattern - shuffling     Chronic congestive heart failure, unspecified heart failure type ([facility])    Type 2 diabetes mellitus with complication, with long-term current use of insulin ([facility])  - patient reports average blood sugar per week 246.  - Patient no longer follows with Endocrinology due to homebound status  - Patient monitors blood sugars with [employer] [facility]  - Reported blood sugar high on monitor after eating a bagel as he did not give sliding scale insulin prior to eating a meal   - re-educated patient on need to take insulin as prescribed   - continue insulin lispro 15 units 3x daily with meals   - continue Lantus 50 units 2x daily   - Patient refuses any change to insulin at this time     Chronic intractable pain  - Patient still experiences pain but states much better controlled with medication  - Continue taking zanaflex 2mg q8 hours PRN  - Continue gabapentin 800 mg PO 4x daily   - Patient takes oxycodone-acetaminophen 5-325 mg q6 hr PRN     Obesity (BMI 30.0-34.9)  - re-educated on nutrition         Subjective:     Patient ID: Alonzo Watson is a 64 y.o. male.    Patient visited today for edema of LE B/L and increased SOB as well as mobility evaluation for [employer] for replacement scooter. Reason for mobility examination: patient's current scooter is not working. Patient continues to need scooter for MRADLs impaired in the home to assist to get to the kitchen for  cooking and eating, and to get to the bathroom for grooming and dressing. Patient cannot use cane or walker due to RLE of 2/5 and LLE of 2/5. He cannot use MWC due to RUE 1/5, LUE 1/5, and  strength 2/5. Patient had been using current scooter and has postural instability, and he is able to use scooter to assist in ADLs. Patient is willing and motivated to use POV and can safely operate POV both mentally and physically. Patient has the ability to shift weight. Greater than 40 mins spent with patient on history of present illness, physical exam and assessment, diagnosing and planning, education, and mobility evaluation.         Review of Systems   Constitutional: Negative.    HENT: Negative.     Eyes: Negative.    Respiratory:  Positive for shortness of breath.    Cardiovascular:  Positive for leg swelling.   Gastrointestinal: Negative.    Endocrine: Negative.    Genitourinary: Negative.    Musculoskeletal:  Positive for arthralgias, back pain and myalgias.   Skin: Negative.    Allergic/Immunologic: Negative.    Neurological: Negative.    Hematological: Negative.    Psychiatric/Behavioral: Negative.           Objective:     Physical Exam  Constitutional:       General: He is not in acute distress.     Appearance: He is obese. He is not ill-appearing, toxic-appearing or diaphoretic.   HENT:      Head: Normocephalic and atraumatic.      Right Ear: External ear normal.      Left Ear: External ear normal.      Nose: Nose normal.      Mouth/Throat:      Mouth: Mucous membranes are moist.      Pharynx: Oropharynx is clear.   Eyes:      General:         Right eye: No discharge.         Left eye: No discharge.      Conjunctiva/sclera: Conjunctivae normal.   Cardiovascular:      Rate and Rhythm: Normal rate and regular rhythm.      Pulses: Normal pulses.      Heart sounds: Normal heart sounds.   Pulmonary:      Effort: Pulmonary effort is normal. No respiratory distress.      Breath sounds: Rales present. No wheezing or  rhonchi.   Abdominal:      General: Bowel sounds are normal.      Tenderness: There is no abdominal tenderness. There is no guarding.   Musculoskeletal:         General: Deformity present.      Cervical back: Normal range of motion. No rigidity.      Right lower leg: Edema present.      Left lower leg: Edema present.      Comments: Edema to B/L feet-not pitting     Lymphadenopathy:      Cervical: No cervical adenopathy.   Skin:     General: Skin is warm and dry.   Neurological:      Mental Status: He is alert and oriented to person, place, and time. Mental status is at baseline.      Motor: Weakness present.      Gait: Gait abnormal.      Comments: Gait pattern - shuffling    Psychiatric:         Mood and Affect: Mood normal.         Behavior: Behavior normal.         Thought Content: Thought content normal.         Judgment: Judgment normal.

## 2024-09-25 RX ORDER — OXYCODONE AND ACETAMINOPHEN 5; 325 MG/1; MG/1
1 TABLET ORAL EVERY 6 HOURS PRN
Start: 2024-09-25

## 2024-09-25 RX ORDER — BUMETANIDE 1 MG/1
1 TABLET ORAL 2 TIMES DAILY
Qty: 30 TABLET | Refills: 3 | Status: SHIPPED | OUTPATIENT
Start: 2024-09-25 | End: 2024-09-27 | Stop reason: SDUPTHER

## 2024-09-27 ENCOUNTER — IN HOME VISIT (OUTPATIENT)
Age: 64
End: 2024-09-27

## 2024-09-27 VITALS
HEART RATE: 93 BPM | OXYGEN SATURATION: 97 % | DIASTOLIC BLOOD PRESSURE: 60 MMHG | SYSTOLIC BLOOD PRESSURE: 100 MMHG | RESPIRATION RATE: 18 BRPM | TEMPERATURE: 98.2 F

## 2024-09-27 DIAGNOSIS — I48.20 CHRONIC A-FIB (HCC): ICD-10-CM

## 2024-09-27 DIAGNOSIS — L03.116 CELLULITIS OF LEFT FOOT: Primary | ICD-10-CM

## 2024-09-27 DIAGNOSIS — I50.9 CHRONIC CONGESTIVE HEART FAILURE, UNSPECIFIED HEART FAILURE TYPE (HCC): ICD-10-CM

## 2024-09-27 DIAGNOSIS — R33.9 URINARY RETENTION: ICD-10-CM

## 2024-09-27 PROBLEM — I50.33 ACUTE ON CHRONIC DIASTOLIC (CONGESTIVE) HEART FAILURE (HCC): Status: RESOLVED | Noted: 2020-02-03 | Resolved: 2024-09-27

## 2024-09-27 PROCEDURE — 99349 HOME/RES VST EST MOD MDM 40: CPT | Performed by: NURSE PRACTITIONER

## 2024-09-27 RX ORDER — BUMETANIDE 1 MG/1
1 TABLET ORAL 2 TIMES DAILY
Qty: 60 TABLET | Refills: 3 | Status: SHIPPED | OUTPATIENT
Start: 2024-09-27

## 2024-09-27 RX ORDER — TAMSULOSIN HYDROCHLORIDE 0.4 MG/1
0.8 CAPSULE ORAL
Qty: 60 CAPSULE | Refills: 3 | Status: SHIPPED | OUTPATIENT
Start: 2024-09-27

## 2024-09-27 RX ORDER — CEPHALEXIN 500 MG/1
500 CAPSULE ORAL 2 TIMES DAILY
Qty: 14 CAPSULE | Refills: 0 | Status: SHIPPED | OUTPATIENT
Start: 2024-09-27 | End: 2024-10-04

## 2024-09-27 NOTE — PROGRESS NOTES
Assessment/Plan:      Diabetic Foot Exam    Patient's shoes and socks removed.    Right Foot/Ankle   Right Foot Inspection  Skin Exam: warmth and erythema. No abnormal color.     Toe Exam: ROM and strength within normal limits.     Sensory   Monofilament testing: intact    Vascular  The right DP pulse is 1+. The right PT pulse is 1+.     Left Foot/Ankle  Left Foot Inspection  Skin Exam: skin normal and skin intact. No dry skin, no warmth, no erythema, no maceration, normal color, no pre-ulcer, no ulcer and no callus.     Toe Exam: ROM and strength within normal limits.     Sensory   Monofilament testing: intact    Vascular  The left DP pulse is 1+. The left PT pulse is 1+.     Assign Risk Category  No deformity present  No loss of protective sensation  No weak pulses  Risk: 0      Diagnoses and all orders for this visit:    Cellulitis of left foot  -keflex 500mg po BID x 7 days  -reeducated to wear slippers in house to avoid damaging skin of feet      Chronic a-fib ([facility])  -patient in a-fib at time of visit with aopcal pulse ranging   -patient had just taken his am medications  -continue digoxin and metoprolol  -asymptomatic at presentB/    Chronic congestive heart failure, unspecified heart failure type ([facility])  -B/L LE edema increased from last visit   -lungs are clear with no crackles  -reeducated on taking bumex as ordered  -reeducated to elevate feet throughout day and night when possible          Subjective:     Patient ID: Alonzo Watson is a 64 y.o. male.    Patient is homebound and seen today for c/o left foot pain with erythema and warmth as well as LE edema bilaterally.  >40 minutes spent with patient on detailed history, physical exam and assessment planning and diagnosing and education.          Review of Systems   Constitutional: Negative.    HENT: Negative.     Eyes: Negative.    Respiratory: Negative.     Cardiovascular:  Positive for leg swelling.   Gastrointestinal: Negative.    Endocrine:  Negative.    Genitourinary: Negative.    Musculoskeletal: Negative.    Skin:         Redness left foot   Allergic/Immunologic: Negative.    Neurological: Negative.    Hematological: Negative.    Psychiatric/Behavioral: Negative.           Objective:     Physical Exam  Constitutional:       General: He is not in acute distress.     Appearance: Normal appearance. He is obese. He is not ill-appearing, toxic-appearing or diaphoretic.   HENT:      Head: Normocephalic and atraumatic.      Right Ear: External ear normal.      Left Ear: External ear normal.      Nose: Nose normal. No congestion.      Mouth/Throat:      Mouth: Mucous membranes are moist.   Eyes:      General:         Right eye: No discharge.         Left eye: No discharge.      Conjunctiva/sclera: Conjunctivae normal.   Cardiovascular:      Rate and Rhythm: Rhythm irregular.      Pulses: no weak pulses.           Dorsalis pedis pulses are 1+ on the right side and 1+ on the left side.        Posterior tibial pulses are 1+ on the right side and 1+ on the left side.   Pulmonary:      Effort: Pulmonary effort is normal. No respiratory distress.      Breath sounds: Normal breath sounds. No wheezing, rhonchi or rales.   Abdominal:      General: Abdomen is flat. Bowel sounds are normal.      Palpations: Abdomen is soft.      Tenderness: There is no abdominal tenderness. There is no guarding.   Musculoskeletal:      Cervical back: Normal range of motion. No rigidity.      Right lower leg: Edema present.      Left lower leg: Edema present.        Feet:    Feet:      Right foot:      Skin integrity: Erythema and warmth present.      Left foot:      Skin integrity: No ulcer, skin breakdown, erythema, warmth, callus or dry skin.   Lymphadenopathy:      Cervical: No cervical adenopathy.   Skin:     Comments: Right foot redness and warmth  Patient always barefoot in home   Neurological:      Mental Status: He is alert and oriented to person, place, and time. Mental status  is at baseline.      Gait: Gait abnormal.   Psychiatric:         Mood and Affect: Mood normal.         Behavior: Behavior normal.         Thought Content: Thought content normal.         Judgment: Judgment normal.

## 2024-09-29 DIAGNOSIS — I50.9 CHRONIC CONGESTIVE HEART FAILURE, UNSPECIFIED HEART FAILURE TYPE (HCC): ICD-10-CM

## 2024-09-29 DIAGNOSIS — E11.65 TYPE 2 DIABETES MELLITUS WITH HYPERGLYCEMIA, WITH LONG-TERM CURRENT USE OF INSULIN (HCC): ICD-10-CM

## 2024-09-29 DIAGNOSIS — E11.65 UNCONTROLLED TYPE 2 DIABETES MELLITUS WITH HYPERGLYCEMIA (HCC): ICD-10-CM

## 2024-09-29 DIAGNOSIS — Z79.4 TYPE 2 DIABETES MELLITUS WITH HYPERGLYCEMIA, WITH LONG-TERM CURRENT USE OF INSULIN (HCC): ICD-10-CM

## 2024-09-29 DIAGNOSIS — G89.29 CHRONIC INTRACTABLE PAIN: ICD-10-CM

## 2024-09-30 RX ORDER — OXYCODONE AND ACETAMINOPHEN 5; 325 MG/1; MG/1
1 TABLET ORAL EVERY 6 HOURS PRN
Qty: 120 TABLET | Refills: 0 | Status: SHIPPED | OUTPATIENT
Start: 2024-09-30 | End: 2024-10-11

## 2024-09-30 RX ORDER — BUMETANIDE 1 MG/1
1 TABLET ORAL 2 TIMES DAILY
Qty: 60 TABLET | Refills: 3 | Status: SHIPPED | OUTPATIENT
Start: 2024-09-30

## 2024-09-30 RX ORDER — PEN NEEDLE, DIABETIC, SAFETY 30 GX3/16"
NEEDLE, DISPOSABLE MISCELLANEOUS
Qty: 150 EACH | Refills: 6 | Status: SHIPPED | OUTPATIENT
Start: 2024-09-30

## 2024-09-30 RX ORDER — UBIQUINOL 100 MG
CAPSULE ORAL 4 TIMES DAILY
Qty: 100 EACH | Refills: 6 | Status: SHIPPED | OUTPATIENT
Start: 2024-09-30

## 2024-10-11 DIAGNOSIS — G89.29 CHRONIC INTRACTABLE PAIN: Primary | ICD-10-CM

## 2024-10-11 RX ORDER — OXYCODONE AND ACETAMINOPHEN 5; 325 MG/1; MG/1
2 TABLET ORAL EVERY 4 HOURS PRN
Qty: 360 TABLET | Refills: 0 | Status: SHIPPED | OUTPATIENT
Start: 2024-10-11

## 2024-10-11 NOTE — PROGRESS NOTES
Patient called to state that at [facility] he was on 2 percocet every 4 hours as needed.  C/o current pain level 9.  Describes pain in left lower side, center of spine between L4 and L5 disc.  Just received a tens unit he is going to try and uses lidocaine patches.  Currently daytime pain worst and causing immobility.  PT and OT seeing patient at home and unable to fully complete therapy due to intractable pain.  Change percocet order to 2 tablets every 4 hours as needed to increase physical functioning, and reduce intractable pain.  Patient educated on risks and benefits of narcotic use and potential side effects.

## 2024-10-24 ENCOUNTER — TELEPHONE (OUTPATIENT)
Age: 64
End: 2024-10-24

## 2024-10-24 NOTE — TELEPHONE ENCOUNTER
[employer] is calling to request updated order or Rx for Power Chair. Rx/order MUST READ (power wheelchair or Electric wheelchair) as insurance will ONLY Cover is worded properly.      [phone]

## 2024-10-25 ENCOUNTER — TELEPHONE (OUTPATIENT)
Age: 64
End: 2024-10-25

## 2024-10-25 NOTE — TELEPHONE ENCOUNTER
[employer] called w/ patient on line to adv that they need a mobility assessment for a power chair for insurance reasons. The assessment that was done on the 24th of Sept is for a scooter , which they can not use to support power chair.  They did state this assessment can be virtual. Patient also stated that is fine with him as well I adv that I will route message to Tuesday so she can call patient and schedule. Please Review, Thank you

## 2024-10-25 NOTE — TELEPHONE ENCOUNTER
Completed RX has been faxed to the number listed below copy has been attached to this encounter.         [phone]

## 2024-10-29 NOTE — TELEPHONE ENCOUNTER
Hanh Poon contacted us with patient on the phone. They are requesting a call back at:    [phone]   Reference# [id number]    Please advise.

## 2024-10-29 NOTE — TELEPHONE ENCOUNTER
Hi Tuesday,    Original Paperwork is scanned into encounter, but I will print out and place back in your folder!    I called [employer], and they stated that either a Face to face appointment needs to be made with the patient, or a virtual.    When form is filled out based on your assessment of patient we will need to provide any scripts he is on and chart notes to fax back with the completed form.    Fax completed form to:  [phone]

## 2024-11-01 ENCOUNTER — TELEPHONE (OUTPATIENT)
Age: 64
End: 2024-11-01

## 2024-11-01 ENCOUNTER — IN HOME VISIT (OUTPATIENT)
Age: 64
End: 2024-11-01

## 2024-11-01 VITALS
TEMPERATURE: 97.9 F | SYSTOLIC BLOOD PRESSURE: 120 MMHG | RESPIRATION RATE: 18 BRPM | HEART RATE: 88 BPM | OXYGEN SATURATION: 96 % | DIASTOLIC BLOOD PRESSURE: 60 MMHG

## 2024-11-01 DIAGNOSIS — G89.29 CHRONIC INTRACTABLE PAIN: ICD-10-CM

## 2024-11-01 DIAGNOSIS — I50.9 CHRONIC CONGESTIVE HEART FAILURE, UNSPECIFIED HEART FAILURE TYPE (HCC): Primary | ICD-10-CM

## 2024-11-01 DIAGNOSIS — E11.8 TYPE 2 DIABETES MELLITUS WITH COMPLICATION, WITH LONG-TERM CURRENT USE OF INSULIN (HCC): ICD-10-CM

## 2024-11-01 DIAGNOSIS — Z79.4 TYPE 2 DIABETES MELLITUS WITH COMPLICATION, WITH LONG-TERM CURRENT USE OF INSULIN (HCC): ICD-10-CM

## 2024-11-01 DIAGNOSIS — R26.2 AMBULATORY DYSFUNCTION: ICD-10-CM

## 2024-11-01 PROCEDURE — 99349 HOME/RES VST EST MOD MDM 40: CPT | Performed by: NURSE PRACTITIONER

## 2024-11-01 NOTE — TELEPHONE ENCOUNTER
[employer]  Scanned into encounter  Placed in pcps folder  Fax: [phone]

## 2024-11-01 NOTE — PROGRESS NOTES
Assessment/Plan:      Diagnoses and all orders for this visit:    Chronic congestive heart failure, unspecified heart failure type (HCC)  -continue Bumex 1mg po BID  -lungs clear today with no crackles or wheezing  -educated to elevate legs as tolerated when awake and to sleep in bed not chair    Type 2 diabetes mellitus with complication, with long-term current use of insulin (HCC)  -patient states his average blood glucose is 250  -Patient states he is not using long acting insulin at this time due to low blood sugars in am less than 150  -he does not want the LA insulin discontinued because he may use it again  -educated again on managing diabetes, goal blood sugars and diet    Chronic intractable pain  -continue percocet for pain-no evidence of over use or abuse of medication  -patient states percocet provides him with moderate relief from pain  -educated to rest when in pain and use TENS unit he purchased for pain management    Ambulatory dysfunction  -- requires replacement power wheelchair  - RUE 1/5; LUE 1/5, RLE 2/5, LLE 2/5  - ROM - able to operate power wheelchair  - gait pattern - shuffling   -PMD is necessary to get to bathroom for toileDiabetic Foot Exam    Patient's shoes and socks removed.    Right Foot/Ankle   Right Foot Inspection  Skin Exam: skin normal and skin intact. No dry skin, no warmth, no callus, no erythema, no maceration, no abnormal color, no pre-ulcer, no ulcer and no callus.     Toe Exam: ROM and strength within normal limits and swelling.     Sensory   Monofilament testing: intact    Vascular  The right PT pulse is 2+.     Left Foot/Ankle  Left Foot Inspection  Skin Exam: skin normal and skin intact. No dry skin, no warmth, no erythema, no maceration, normal color, no pre-ulcer, no ulcer and no callus.     Toe Exam: ROM and strength within normal limits and swelling.     Sensory   Monofilament testing: intact    Vascular  The left PT pulse is 2+.     Assign Risk Category  No  deformity present  Loss of protective sensation  No weak pulses  Risk: 1  ting, accessing kitchen for meals and assist in grooming  -patient has history of using powered wheelchair and requires replacement        Subjective:     Patient ID: Alonzo Watson is a 64 y.o. male.    Patient visited today for edema of LE B/L  as well as mobility evaluation for [employer] for replacement power wheel chair.  . Reason for mobility examination: patient's current power wheelchair is not working. Patient continues to need power wheelchair for MRADLs impaired in the home to assist to get to the kitchen for cooking and eating, and to get to the bathroom for grooming and dressing. Patient cannot use cane or walker due to RLE of 2/5 and LLE of 2/5. He cannot use scooter due to RUE 1/5, LUE 1/5, and  strength 2/5. Patient had been using current power wheelchair and has postural instability, and he is able to use power wheelchair to assist in ADLs. Patient is willing and motivated to use power wheel chair and can safely operate  both mentally and physically. Patient has the ability to shift weight. Greater than 40 mins spent with patient on history of present illness, physical exam and assessment, diagnosing and planning, education, and mobility evaluation.         Review of Systems   Constitutional: Negative.    HENT: Negative.     Eyes: Negative.    Respiratory: Negative.     Cardiovascular:  Positive for leg swelling.   Gastrointestinal: Negative.    Endocrine: Negative.    Genitourinary: Negative.    Musculoskeletal:  Positive for arthralgias and myalgias.   Skin: Negative.    Allergic/Immunologic: Negative.    Neurological: Negative.    Hematological: Negative.    Psychiatric/Behavioral: Negative.           Objective:     Physical Exam  Constitutional:       General: He is not in acute distress.     Appearance: He is obese. He is not ill-appearing, toxic-appearing or diaphoretic.   HENT:      Head: Normocephalic and atraumatic.       Right Ear: External ear normal.      Left Ear: External ear normal.      Nose: Nose normal. No congestion.      Mouth/Throat:      Mouth: Mucous membranes are moist.      Pharynx: Oropharyngeal exudate present.   Eyes:      General:         Right eye: No discharge.         Left eye: No discharge.      Conjunctiva/sclera: Conjunctivae normal.   Cardiovascular:      Rate and Rhythm: Normal rate and regular rhythm.      Pulses: no weak pulses.           Posterior tibial pulses are 2+ on the right side and 2+ on the left side.      Heart sounds: Normal heart sounds.   Pulmonary:      Effort: Pulmonary effort is normal. No respiratory distress.      Breath sounds: Normal breath sounds. No wheezing, rhonchi or rales.   Abdominal:      General: Bowel sounds are normal.      Tenderness: There is no abdominal tenderness. There is no guarding.   Musculoskeletal:         General: Normal range of motion.      Cervical back: Normal range of motion. No rigidity.      Right lower leg: Edema present.      Left lower leg: Edema present.      Comments: Dependent edema B/L LE plus 1   Feet:      Right foot:      Skin integrity: No ulcer, skin breakdown, erythema, warmth, callus or dry skin.      Left foot:      Skin integrity: No ulcer, skin breakdown, erythema, warmth, callus or dry skin.   Lymphadenopathy:      Cervical: No cervical adenopathy.   Skin:     General: Skin is warm and dry.   Neurological:      General: No focal deficit present.      Mental Status: He is alert and oriented to person, place, and time.      Gait: Gait abnormal.   Psychiatric:         Mood and Affect: Mood normal.         Behavior: Behavior normal.         Thought Content: Thought content normal.         Judgment: Judgment normal.

## 2024-11-04 DIAGNOSIS — E11.8 TYPE 2 DIABETES MELLITUS WITH COMPLICATION, WITH LONG-TERM CURRENT USE OF INSULIN (HCC): ICD-10-CM

## 2024-11-04 DIAGNOSIS — E11.65 TYPE 2 DIABETES MELLITUS WITH HYPERGLYCEMIA, WITH LONG-TERM CURRENT USE OF INSULIN (HCC): ICD-10-CM

## 2024-11-04 DIAGNOSIS — R33.9 URINARY RETENTION: ICD-10-CM

## 2024-11-04 DIAGNOSIS — Z79.4 TYPE 2 DIABETES MELLITUS WITH COMPLICATION, WITH LONG-TERM CURRENT USE OF INSULIN (HCC): ICD-10-CM

## 2024-11-04 DIAGNOSIS — Z79.4 TYPE 2 DIABETES MELLITUS WITH HYPERGLYCEMIA, WITH LONG-TERM CURRENT USE OF INSULIN (HCC): ICD-10-CM

## 2024-11-04 DIAGNOSIS — G62.9 PERIPHERAL POLYNEUROPATHY: ICD-10-CM

## 2024-11-04 DIAGNOSIS — I48.91 ATRIAL FIBRILLATION WITH RVR (HCC): ICD-10-CM

## 2024-11-04 DIAGNOSIS — E11.42 DIABETIC POLYNEUROPATHY ASSOCIATED WITH TYPE 2 DIABETES MELLITUS (HCC): ICD-10-CM

## 2024-11-04 DIAGNOSIS — I25.10 CORONARY ARTERY DISEASE INVOLVING NATIVE HEART WITHOUT ANGINA PECTORIS, UNSPECIFIED VESSEL OR LESION TYPE: ICD-10-CM

## 2024-11-04 DIAGNOSIS — I25.10 ATHEROSCLEROSIS OF NATIVE CORONARY ARTERY WITHOUT ANGINA PECTORIS, UNSPECIFIED WHETHER NATIVE OR TRANSPLANTED HEART: ICD-10-CM

## 2024-11-05 RX ORDER — BLOOD SUGAR DIAGNOSTIC
STRIP MISCELLANEOUS
Qty: 400 EACH | Refills: 6 | Status: SHIPPED | OUTPATIENT
Start: 2024-11-05

## 2024-11-05 RX ORDER — TAMSULOSIN HYDROCHLORIDE 0.4 MG/1
0.8 CAPSULE ORAL
Qty: 60 CAPSULE | Refills: 6 | Status: SHIPPED | OUTPATIENT
Start: 2024-11-05

## 2024-11-05 RX ORDER — ATORVASTATIN CALCIUM 20 MG/1
20 TABLET, FILM COATED ORAL EVERY EVENING
Qty: 90 TABLET | Refills: 1 | Status: SHIPPED | OUTPATIENT
Start: 2024-11-05 | End: 2024-11-13 | Stop reason: SDUPTHER

## 2024-11-05 RX ORDER — GABAPENTIN 800 MG/1
800 TABLET ORAL 4 TIMES DAILY
Qty: 120 TABLET | Refills: 3 | Status: SHIPPED | OUTPATIENT
Start: 2024-11-05

## 2024-11-05 RX ORDER — UBIQUINOL 100 MG
CAPSULE ORAL 4 TIMES DAILY
Qty: 100 EACH | Refills: 0 | Status: SHIPPED | OUTPATIENT
Start: 2024-11-05

## 2024-11-05 RX ORDER — METOPROLOL SUCCINATE 200 MG/1
200 TABLET, EXTENDED RELEASE ORAL DAILY
Qty: 90 TABLET | Refills: 6 | Status: SHIPPED | OUTPATIENT
Start: 2024-11-05 | End: 2033-02-21

## 2024-11-07 ENCOUNTER — TELEPHONE (OUTPATIENT)
Age: 64
End: 2024-11-07

## 2024-11-07 NOTE — TELEPHONE ENCOUNTER
Patient is calling in extremely frustrated as he states he spoke with [facility] and was informed Rx/clinical note has not been received for a Power Wheel Chair. I did fax forms to the number listed below;      [phone]

## 2024-11-08 ENCOUNTER — TELEPHONE (OUTPATIENT)
Age: 64
End: 2024-11-08

## 2024-11-08 NOTE — TELEPHONE ENCOUNTER
"Hi TuesdayPaty called again, everything looks good,except they need you to make an addendum on the November 1 home visit stating that \"A claus wheelchair is ruled out\"    Please add this at your earliest convenience and  I will fax this over to [facility]!    Thanks!  "

## 2024-11-08 NOTE — TELEPHONE ENCOUNTER
[employer]  Letter of medical necessity to be signed and dated  Scanned into encounter  Placed in pcps folder  Fax: [phone]

## 2024-11-12 ENCOUNTER — TELEPHONE (OUTPATIENT)
Age: 64
End: 2024-11-12

## 2024-11-13 DIAGNOSIS — G89.29 CHRONIC INTRACTABLE PAIN: ICD-10-CM

## 2024-11-13 DIAGNOSIS — I50.9 CHRONIC CONGESTIVE HEART FAILURE, UNSPECIFIED HEART FAILURE TYPE (HCC): ICD-10-CM

## 2024-11-13 DIAGNOSIS — I25.10 ATHEROSCLEROSIS OF NATIVE CORONARY ARTERY WITHOUT ANGINA PECTORIS, UNSPECIFIED WHETHER NATIVE OR TRANSPLANTED HEART: ICD-10-CM

## 2024-11-13 RX ORDER — OXYCODONE AND ACETAMINOPHEN 5; 325 MG/1; MG/1
2 TABLET ORAL EVERY 4 HOURS PRN
Qty: 360 TABLET | Refills: 0 | Status: SHIPPED | OUTPATIENT
Start: 2024-11-13

## 2024-11-13 RX ORDER — BUMETANIDE 1 MG/1
1 TABLET ORAL 2 TIMES DAILY
Qty: 60 TABLET | Refills: 3 | Status: SHIPPED | OUTPATIENT
Start: 2024-11-13

## 2024-11-13 RX ORDER — ATORVASTATIN CALCIUM 20 MG/1
20 TABLET, FILM COATED ORAL EVERY EVENING
Qty: 90 TABLET | Refills: 3 | Status: SHIPPED | OUTPATIENT
Start: 2024-11-13

## 2024-11-13 NOTE — TELEPHONE ENCOUNTER
"Faxed completed form.     Placed in \"to be scanned\" bin       [employer]    "

## 2024-11-14 ENCOUNTER — TELEPHONE (OUTPATIENT)
Age: 64
End: 2024-11-14

## 2024-11-14 NOTE — TELEPHONE ENCOUNTER
[employer]  Detailed Product description  Scanned into encounter  Placed in pcps folder  Fax: [phone]

## 2024-11-17 ENCOUNTER — VBI (OUTPATIENT)
Dept: ADMINISTRATIVE | Facility: OTHER | Age: 64
End: 2024-11-17

## 2024-11-17 NOTE — TELEPHONE ENCOUNTER
11/17/24 2:01 PM     Chart reviewed for Hemoglobin A1c and Blood Pressure was/were submitted to the patient's insurance.     Awilda Hirsch MA   [facility]

## 2024-11-26 DIAGNOSIS — M62.838 MUSCLE SPASMS OF BOTH LOWER EXTREMITIES: ICD-10-CM

## 2024-11-27 RX ORDER — TIZANIDINE 2 MG/1
2 TABLET ORAL EVERY 8 HOURS PRN
Qty: 90 TABLET | Refills: 2 | Status: SHIPPED | OUTPATIENT
Start: 2024-11-27

## 2024-11-29 NOTE — PROGRESS NOTES
Phan 44  Progress Note Juliette Ash 1960, 57 y o  male MRN: [id number]  Unit/Bed#: ICU 09 Encounter: [id number]  Primary Care Provider: Lena Schaefer MD   Date and time admitted to hospital: 6/17/2022  7:23 AM    Right lower lobe pneumonia  Assessment & Plan  Tachypnea POA with right lower lobe infiltrate/atelectasis on CXR  Patient was recently admitted 4/24/22 and is within 60 day window, however he still low criteria for Hcap - MRSA positive but improving with rocephin  Patient received IV Zosyn in ED  Received 1 dose azithromycin and was then discontinued  · Day 4 IV ceftriaxone, continue  · Urine for strep PNA and legionella antigens negative  · Continuous pulse-oximetry, wean FiO2 for goal SpO2 >88%    Chronic diastolic CHF  Assessment & Plan  Wt Readings from Last 3 Encounters:   06/20/22 123 kg (270 lb 4 5 oz)   05/23/22 126 kg (277 lb)   05/20/22 126 kg (277 lb)     · Hx of HFpEF  Last ECHO 03/2022 with EF of 55%  · Continue home metoprolol and digoxin with holding parameters if BP is low  · Daily weights  · Strict I&O monitoring    Chronic pain syndrome  Assessment & Plan  · Continue home gabapentin 300 mg t i d  Severe sepsis (HCC)  Assessment & Plan  Severe Sepsis, POA, due to pneumonia as evidenced by WBC > 12 00 (21 41) and HR > 90, with lactate > 2 0 (2 8, 2 5); requiring IV Zosyn in the ED, IV Rocephin, blood cultures x 2, and lactic acid levels    · Azithromycin discontinued, continue IV ceftriaxone (Day 4 today)  · Patient with diarrhea which is improving, one loose BM since yesterday - C diff negative   · BC x2: NGTD  · Follow blood cultures, WBC and temperature curve    Atrial fibrillation  Assessment & Plan  · Tachycardic to low 100's on admission  · EKG showed Atrial fibrillation with no ST changes  · Continue home digoxin, metoprolol and Eliquis     Dyslipidemia  Assessment & Plan  · Continue Lipitor    Chronic respiratory failure with hypoxia [facility])  Assessment & Plan  Patient with a history of COPD with chronic respiratory failure currently using 2-3L of O2, titrates up to 4-6L of O2 with exertion at baseline  On admission day, patient states that he had been using 4 L of oxygen in the past few days due to shortness of breath  Mild tachypnea to low 20's POA in setting of metabolic acidosis with mild DKA  CXR with R  base opacification suspicious for infiltrate vs  Atelectasis  · Started on azithromycin and ceftriaxone  See pneumonia plan below  · Substituted Trelergy for Breo  · Patient at his baseline O2 requirements of 2-3 L today  · SpO2 has been stable on nasal cannula  Using CPAP 12/5 40% HS     · Titrate O2 for goal SpO2 >88%    Panic disorder without agoraphobia  Assessment & Plan  · Continue home sertraline 50 mg QD, Seroquel 100 mg QAM, Seroquel 300mg nightly, BuSpar 10 mg b i d   · Requested nightly alprazolam 2mg PRN, reordered after script confirmed via PDMP      Essential hypertension  Assessment & Plan  · SBP on admission 150s-160s in setting of DKA, overall stable in 130s today  · Continue home losartan 50 mg daily and metoprolol 200 mg daily  · VS per unit protocol    Coronary artery disease  Assessment & Plan  · Continue ASA, Lipitor, Lopressor, Losartan    Insulin-dependent diabetes mellitus with neuropathy  Assessment & Plan  Lab Results   Component Value Date    HGBA1C 8 6 (H) 03/25/2022       Recent Labs     06/19/22  0627 06/19/22  1123 06/19/22  1543 06/19/22  2115   POCGLU 183* 282* 303* 167*     Blood Sugar Average: Last 72 hrs:  (P) 240   · See DKA plan above      * Diabetic ketoacidosis without coma associated with type 2 diabetes mellitus [facility])  Assessment & Plan  Lab Results   Component Value Date    HGBA1C 8 6 (H) 03/25/2022       Recent Labs     06/19/22  0627 06/19/22  1123 06/19/22  1543 06/19/22  2115   POCGLU 183* 282* 303* 167*       Blood Sugar Average: Last 72 hrs:  (P) 240   · DKA POA as evidenced by glucose >600, Beta hydroxybutyrate 2 7, anion gap 14, pH 7 4, bicarb 20 6  Patient presented with SOB and diarrhea past few days, reports compliance with insulin but not blood glucose checks at home  · Home regimen: metformin, Victoza, 75 units of glargine b i d  and 35 units of NovoLog t i d  · Last A1c was 2022 at 8 6  · Patient started on insulin drip and received 6L IV fluids per DKA protocol, admitted to level on step-down under Critical Care Service, titrated off insulin drip and resumed scheduled insulin   Anion gap closed since   · Patient started on Lantus 60 units b i d  and NovoLog 20 units t i d  with SSI coverage  · Blood glucose today overall stable 180-280s, will adjust insulin regimen as indicated  · Hypoglycemia protocol  · Carb controlled diet  · Endocrinology consult pending       VTE Pharmacologic Prophylaxis:   Pharmacologic: Apixaban (Eliquis)  Mechanical VTE Prophylaxis in Place: Yes    Patient Centered Rounds: I have performed bedside rounds with nursing staff today  Discussions with Specialists or Other Care Team Provider: Yes  Education and Discussions with Family / Patient:Yes  Time Spent for Care: 15 minutes  More than 50% of total time spent on counseling and coordination of care as described above  Current Length of Stay: 3 day(s)  Current Patient Status: Inpatient     Discharge Plan: PT evaluation pending     Code Status: Level 1 - Full Code      Subjective:   Patient states his breathing is improved, labs are also improving  He is on his home oxygen requirements  He has no chest pain or shortness of breath  Diarrhea has improved  Blood sugars remain elevated  He is very weak, expressed concerns about being safely discharged home - PT evaluation pending, patient amenable to rehab  He has a crocker in for retention - refusing removal at this time despite education on risks of infection          Objective:     Vitals:   Temp (24hrs), Av 9 °F (36 6 °C), Min:96 8 °F (36 °C), Max:98 7 °F (37 1 °C)    Temp:  [96 8 °F (36 °C)-98 7 °F (37 1 °C)] 98 4 °F (36 9 °C)  HR:  [] 94  Resp:  [20-30] 22  BP: (120-144)/(55-73) 126/62  SpO2:  [93 %-100 %] 100 %  Body mass index is 37 7 kg/m²  Input and Output Summary (last 24 hours): Intake/Output Summary (Last 24 hours) at 6/20/2022 0952  Last data filed at 6/20/2022 0748  Gross per 24 hour   Intake 550 ml   Output 3635 ml   Net -3085 ml        Physical Exam:     Physical Exam  Vitals and nursing note reviewed  Constitutional:       Interventions: Nasal cannula in place  HENT:      Head: Normocephalic  Nose: Nose normal    Eyes:      Extraocular Movements: Extraocular movements intact  Cardiovascular:      Rate and Rhythm: Normal rate and regular rhythm  Heart sounds: No murmur heard  Pulmonary:      Effort: Pulmonary effort is normal  No respiratory distress  Comments: Diminished bilaterally  Abdominal:      General: Abdomen is flat  Bowel sounds are normal       Palpations: Abdomen is soft  Comments: obese   Musculoskeletal:         General: No swelling  Normal range of motion  Skin:     General: Skin is warm and dry  Neurological:      General: No focal deficit present  Mental Status: He is alert and oriented to person, place, and time     Psychiatric:         Mood and Affect: Mood normal          Behavior: Behavior normal          Additional Data:     Labs:    Results from last 7 days   Lab Units 06/20/22  0456 06/19/22  0549 06/18/22  0600   WBC Thousand/uL 9 18 11 76* 14 03*   HEMOGLOBIN g/dL 11 1* 12 3 11 9*   HEMATOCRIT % 33 4* 38 1 35 9*   PLATELETS Thousands/uL 144* 156 184   NEUTROS PCT % 45 52 62     Results from last 7 days   Lab Units 06/20/22  0456 06/19/22  0549 06/18/22  0600 06/17/22  0955 06/17/22  0752   SODIUM mmol/L 136 136 137   < > 126*   POTASSIUM mmol/L 4 1 3 5 4 0   < > 4 2   CHLORIDE mmol/L 103 98* 102   < > 85*   CO2 mmol/L 28 31 30   < > 27   BUN mg/dL 12 14 17 < > 31*   CREATININE mg/dL 0 72 0 78 0 84   < > 1 33*   CALCIUM mg/dL 8 4 8 4 8 4   < > 9 9   TOTAL BILIRUBIN mg/dL  --   --  0 68  --  1 36*   ALK PHOS U/L  --   --  51  --  86   ALT U/L  --   --  19  --  25   AST U/L  --   --  15  --  12    < > = values in this interval not displayed  Results from last 7 days   Lab Units 06/17/22  0752   INR  1 05         Lab Results   Component Value Date/Time    HGBA1C 8 6 (H) 03/25/2022 06:12 AM    HGBA1C 8 0 07/21/2017 09:27 AM     Results from last 7 days   Lab Units 06/19/22  2115 06/19/22  1543 06/19/22  1123 06/19/22  0627 06/18/22  2134 06/18/22  1554 06/18/22  1249 06/18/22  1146 06/18/22  1006 06/18/22  0754 06/18/22  0558 06/18/22  0359   POC GLUCOSE mg/dl 167* 303* 282* 183* 270* 268* 163* 143* 203* 181* 152* 180*     Results from last 7 days   Lab Units 06/18/22  0600 06/17/22  1152 06/17/22  0955 06/17/22  0752   LACTIC ACID mmol/L  --  2 0 2 5* 2 8*   PROCALCITONIN ng/ml 0 64*  --   --   --        * I Have Reviewed All Lab Data Listed Above  * Additional Pertinent Lab Tests Reviewed: [facility]    Imaging:     XR chest 1 view portable   Final Result by Nellie Tamayo MD (06/17 0431)      Development of right basal opacity suspicious for infiltrate/atelectasis                  Workstation performed: [id number]           Imaging Reports Reviewed by myself    Cultures:   Blood Culture:   Lab Results   Component Value Date    BLOODCX No Growth at 48 hrs  06/17/2022    BLOODCX No Growth at 48 hrs  06/17/2022    BLOODCX No Growth After 5 Days  04/25/2022    BLOODCX No Growth After 5 Days  04/25/2022    BLOODCX No Growth After 5 Days  02/06/2020    BLOODCX No Growth After 5 Days  02/06/2020     Urine Culture:   Lab Results   Component Value Date    URINECX 0823-0190 cfu/ml Mixed Contaminants X2 03/20/2017    URINECX No Growth <1000 cfu/mL 11/27/2016    URINECX No Growth <1000 cfu/mL 09/02/2016     Sputum Culture:  No components found for: SPUTUMCX  Wound Culture: No results found for: WOUNDCULT    Last 24 Hours Medication List:   Current Facility-Administered Medications   Medication Dose Route Frequency Provider Last Rate    acetaminophen  650 mg Oral Q6H PRN Vivian Jumper, DO      albuterol  2 5 mg Nebulization Q6H PRN Vivian Jumper, DO      ALPRAZolam  2 mg Oral HS PRN Johanne Curling, CRNP      apixaban  5 mg Oral BID Vivian Jumpottoniel, DO      aspirin  81 mg Oral Daily Vivian Garcia, [place]      atorvastatin  80 mg Oral Daily With [place], [place]      baclofen  10 mg Oral Q8H PRN Vivian Jumpottoniel, DO      busPIRone  10 mg Oral BID Vivian Garcia, DO      cefTRIAXone  1,000 mg Intravenous Q24H Vivian Garcia, DO Stopped (06/19/22 1630)    cholecalciferol  1,000 Units Oral Daily Vivian Jumpottoniel, DO      digoxin  250 mcg Oral Daily Vivian Garcia, [place]      fluticasone-vilanterol  1 puff Inhalation Daily Loida Campbell, [address]      gabapentin  300 mg Oral TID Vivian Garcia, DO      guaiFENesin  600 mg Oral V36N [address] Rab[place], [place]      hydrocodone-chlorpheniramine polistirex  5 mL Oral HS [place], [place]      insulin glargine  65 Units Subcutaneous Q12H [address] Ronaldo Duff PA-C      insulin lispro  2-12 Units Subcutaneous HS Suzy Rowe MD      insulin lispro  2-12 Units Subcutaneous TID AC Eugene Turner PA-C      insulin lispro  30 Units Subcutaneous TID With Meals Ronaldo Duff PA-C      ipratropium-albuterol  3 mL Nebulization C5V Rabia Jean Baptiste PA-C      losartan  50 mg Oral Daily Vivian Higgins, [place]      melatonin  3 mg Oral HS PRN Johanne Curling, CRNP      metoprolol succinate  200 mg Oral Daily Vivian Jumpottoniel, DO      mupirocin   Nasal Q12H [address] Toy [place], MANAS      nystatin   Topical BID Johanne Curling, CRNP      pantoprazole  40 mg Oral Early Morning Vivian Garcia, DO      phenol  1 spray Mouth/Throat I5A PRN Thea Garibay, PA-C  QUEtiapine  100 mg Oral QAM Richi Morales,       QUEtiapine  300 mg Oral HS Richi Morales, [place]      sertraline  50 mg Oral Daily Richi Morales [place]      tamsulosin  0 4 mg Oral Daily With [address], PA-C          Today, Patient Was Seen By: AMNAS Feliciano    ** Please Note: Dragon 360 Dictation voice to text software may have been used in the creation of this document   ** no

## 2024-12-09 ENCOUNTER — IN HOME VISIT (OUTPATIENT)
Age: 64
End: 2024-12-09

## 2024-12-09 ENCOUNTER — TRANSCRIBE ORDERS (OUTPATIENT)
Dept: LAB | Facility: CLINIC | Age: 64
End: 2024-12-09

## 2024-12-09 ENCOUNTER — APPOINTMENT (OUTPATIENT)
Dept: LAB | Facility: CLINIC | Age: 64
End: 2024-12-09
Payer: COMMERCIAL

## 2024-12-09 VITALS
HEART RATE: 78 BPM | RESPIRATION RATE: 16 BRPM | DIASTOLIC BLOOD PRESSURE: 80 MMHG | TEMPERATURE: 98 F | SYSTOLIC BLOOD PRESSURE: 120 MMHG

## 2024-12-09 DIAGNOSIS — M10.9 GOUT INVOLVING TOE OF RIGHT FOOT, UNSPECIFIED CAUSE, UNSPECIFIED CHRONICITY: ICD-10-CM

## 2024-12-09 DIAGNOSIS — C91.11 CHRONIC LYMPHOCYTIC LEUKEMIA OF B-CELL TYPE IN REMISSION (HCC): ICD-10-CM

## 2024-12-09 DIAGNOSIS — Z79.4 TYPE 2 DIABETES MELLITUS WITH COMPLICATION, WITH LONG-TERM CURRENT USE OF INSULIN (HCC): ICD-10-CM

## 2024-12-09 DIAGNOSIS — E87.1 HYPONATREMIA: ICD-10-CM

## 2024-12-09 DIAGNOSIS — E11.8 TYPE 2 DIABETES MELLITUS WITH COMPLICATION, WITH LONG-TERM CURRENT USE OF INSULIN (HCC): ICD-10-CM

## 2024-12-09 DIAGNOSIS — E87.1 HYPONATREMIA: Primary | ICD-10-CM

## 2024-12-09 DIAGNOSIS — R52 DIFFUSE PAIN: ICD-10-CM

## 2024-12-09 DIAGNOSIS — I50.22 CHRONIC SYSTOLIC HEART FAILURE (HCC): ICD-10-CM

## 2024-12-09 LAB
ALBUMIN SERPL BCG-MCNC: 5.3 G/DL (ref 3.5–5)
ALP SERPL-CCNC: 83 U/L (ref 34–104)
ALT SERPL W P-5'-P-CCNC: 40 U/L (ref 7–52)
ANION GAP SERPL CALCULATED.3IONS-SCNC: 14 MMOL/L (ref 4–13)
ANISOCYTOSIS BLD QL SMEAR: PRESENT
AST SERPL W P-5'-P-CCNC: 40 U/L (ref 13–39)
BASOPHILS # BLD MANUAL: 0 THOUSAND/UL (ref 0–0.1)
BASOPHILS NFR MAR MANUAL: 0 % (ref 0–1)
BILIRUB SERPL-MCNC: 0.82 MG/DL (ref 0.2–1)
BUN SERPL-MCNC: 9 MG/DL (ref 5–25)
BURR CELLS BLD QL SMEAR: PRESENT
CALCIUM SERPL-MCNC: 10.1 MG/DL (ref 8.4–10.2)
CHLORIDE SERPL-SCNC: 93 MMOL/L (ref 96–108)
CO2 SERPL-SCNC: 26 MMOL/L (ref 21–32)
CREAT SERPL-MCNC: 0.88 MG/DL (ref 0.6–1.3)
DIFFERENTIAL COMMENT: ABNORMAL
EOSINOPHIL # BLD MANUAL: 0 THOUSAND/UL (ref 0–0.4)
EOSINOPHIL NFR BLD MANUAL: 0 % (ref 0–6)
ERYTHROCYTE [DISTWIDTH] IN BLOOD BY AUTOMATED COUNT: 13.3 % (ref 11.6–15.1)
EST. AVERAGE GLUCOSE BLD GHB EST-MCNC: 260 MG/DL
GFR SERPL CREATININE-BSD FRML MDRD: 90 ML/MIN/1.73SQ M
GLUCOSE SERPL-MCNC: 258 MG/DL (ref 65–140)
HBA1C MFR BLD: 10.7 %
HCT VFR BLD AUTO: 49.1 % (ref 36.5–49.3)
HGB BLD-MCNC: 16 G/DL (ref 12–17)
LYMPHOCYTES # BLD AUTO: 20.98 THOUSAND/UL (ref 0.6–4.47)
LYMPHOCYTES # BLD AUTO: 80 % (ref 14–44)
MACROCYTES BLD QL AUTO: PRESENT
MCH RBC QN AUTO: 30.2 PG (ref 26.8–34.3)
MCHC RBC AUTO-ENTMCNC: 32.6 G/DL (ref 31.4–37.4)
MCV RBC AUTO: 93 FL (ref 82–98)
MONOCYTES # BLD AUTO: 0.26 THOUSAND/UL (ref 0–1.22)
MONOCYTES NFR BLD: 1 % (ref 4–12)
NEUTROPHILS # BLD MANUAL: 4.35 THOUSAND/UL (ref 1.85–7.62)
NEUTS SEG NFR BLD AUTO: 17 % (ref 43–75)
PLATELET # BLD AUTO: 163 THOUSANDS/UL (ref 149–390)
PLATELET BLD QL SMEAR: ADEQUATE
PMV BLD AUTO: 10.7 FL (ref 8.9–12.7)
POIKILOCYTOSIS BLD QL SMEAR: PRESENT
POTASSIUM SERPL-SCNC: 4.3 MMOL/L (ref 3.5–5.3)
PROT SERPL-MCNC: 8.3 G/DL (ref 6.4–8.4)
RBC # BLD AUTO: 5.29 MILLION/UL (ref 3.88–5.62)
RBC MORPH BLD: PRESENT
SMUDGE CELLS BLD QL SMEAR: PRESENT
SODIUM SERPL-SCNC: 133 MMOL/L (ref 135–147)
URATE SERPL-MCNC: 5 MG/DL (ref 3.5–8.5)
VARIANT LYMPHS # BLD AUTO: 2 %
WBC # BLD AUTO: 25.59 THOUSAND/UL (ref 4.31–10.16)

## 2024-12-09 PROCEDURE — 83036 HEMOGLOBIN GLYCOSYLATED A1C: CPT

## 2024-12-09 PROCEDURE — 84550 ASSAY OF BLOOD/URIC ACID: CPT

## 2024-12-09 PROCEDURE — 36415 COLL VENOUS BLD VENIPUNCTURE: CPT

## 2024-12-09 PROCEDURE — 85007 BL SMEAR W/DIFF WBC COUNT: CPT

## 2024-12-09 PROCEDURE — 80053 COMPREHEN METABOLIC PANEL: CPT

## 2024-12-09 PROCEDURE — 99349 HOME/RES VST EST MOD MDM 40: CPT | Performed by: NURSE PRACTITIONER

## 2024-12-09 PROCEDURE — 85027 COMPLETE CBC AUTOMATED: CPT

## 2024-12-09 NOTE — PROGRESS NOTES
Assessment/Plan:      Diagnoses and all orders for this visit:    Hyponatremia  -     Comprehensive metabolic panel; Future  -     Cancel: Comprehensive metabolic panel  -              Component  Ref Range & Units (hover) 12/9/24  2:36 PM 9/12/24  4:45 AM 9/6/24  6:20 AM 8/26/24  4:48 AM 8/25/24  6:21 AM 8/24/24 11:51 PM 8/24/24 12:22 PM   Sodium 133 Low             -continue Na CL tablets 1 mg qd    Diffuse pain  -     tiZANidine (ZANAFLEX) 4 mg tablet; Take 1 tablet (4 mg total) by mouth every 8 (eight) hours as needed for muscle spasms  --continue Percocet every 4 hours for pain  -patient does not overuse or misuse percocet  -discussed long acting pain management but patient does not want to change  -continue neurontin per order-patient does not abuse or misuse medication  -increase tizanidine as patient states it assists with his pain  -uric acid checked and normal  -educated to try and exercise in his chair  -he does not want PT    Chronic lymphocytic leukemia of B-cell type in remission (HCC)  -     CBC and differential; Future  -     Cancel: CBC and differential  -            Component  Ref Range & Units (hover) 12/9/24  2:36 PM 8/26/24  4:48 AM 8/25/24  6:21 AM 8/24/24 11:51 PM 8/24/24 11:50 AM 8/22/24  1:43 PM 8/20/24  5:13 AM   WBC 25.59 High  20.64 High            -patient is currently receiving no treatment    Type 2 diabetes mellitus with complication, with long-term current use of insulin (HCC)  -     Hemoglobin A1C; Future  -     Cancel: Hemoglobin A1C  -  Hemoglobin A1C 10.7 High  10.4 High    -A1C used to be in 9's  -educated on diabetes  -not compliant with insulin orders  -average 7day   -educated again that ideal BG below 150  -patient believes he cannot have BG <250 despite education  -educated on insulin and orders          Chronic systolic heart failure (HCC)  -     losartan (COZAAR) 50 mg tablet; Take 1 tablet (50 mg total) by mouth daily        Subjective:     Patient ID: Alonzo Watson  is a 64 y.o. male.    Patient seen today at home for follow up and c/o increased pain.  > 40 minutes spent on detailed history, physical exam and assessment, planning and diagnosing and diabetes education and blood draw.           Review of Systems   Constitutional:  Positive for appetite change.   HENT: Negative.     Eyes: Negative.    Respiratory: Negative.     Cardiovascular: Negative.    Gastrointestinal: Negative.    Endocrine: Negative.    Genitourinary: Negative.    Musculoskeletal:  Positive for back pain and gait problem.        C/o back pain, left sided pain and pain in both lower extremities   Skin: Negative.    Allergic/Immunologic: Negative.    Neurological:  Positive for weakness.   Hematological: Negative.    Psychiatric/Behavioral: Negative.           Objective:     Physical Exam  Constitutional:       General: He is not in acute distress.     Appearance: Normal appearance. He is obese. He is not ill-appearing, toxic-appearing or diaphoretic.   HENT:      Head: Normocephalic and atraumatic.      Right Ear: External ear normal.      Left Ear: External ear normal.      Nose: Nose normal. No congestion.      Mouth/Throat:      Mouth: Mucous membranes are moist.      Pharynx: Oropharynx is clear.   Eyes:      General:         Right eye: No discharge.         Left eye: No discharge.      Conjunctiva/sclera: Conjunctivae normal.   Cardiovascular:      Rate and Rhythm: Normal rate and regular rhythm.      Heart sounds: Normal heart sounds.   Pulmonary:      Effort: Pulmonary effort is normal. No respiratory distress.      Breath sounds: Normal breath sounds. No wheezing, rhonchi or rales.   Abdominal:      General: Bowel sounds are normal.      Tenderness: There is no abdominal tenderness. There is no guarding.   Musculoskeletal:         General: Normal range of motion.      Cervical back: Normal range of motion. No rigidity.      Right lower leg: No edema.      Left lower leg: No edema.   Lymphadenopathy:       Cervical: No cervical adenopathy.   Skin:     General: Skin is warm and dry.      Findings: No bruising or erythema.      Comments: Skin of feet and LE very sensitive to touch   Neurological:      General: No focal deficit present.      Mental Status: He is alert and oriented to person, place, and time.      Motor: Weakness present.      Gait: Gait abnormal.   Psychiatric:         Mood and Affect: Mood normal.         Behavior: Behavior normal.         Thought Content: Thought content normal.

## 2024-12-10 DIAGNOSIS — K59.00 CONSTIPATION, UNSPECIFIED CONSTIPATION TYPE: ICD-10-CM

## 2024-12-10 DIAGNOSIS — I25.10 ATHEROSCLEROSIS OF NATIVE CORONARY ARTERY WITHOUT ANGINA PECTORIS, UNSPECIFIED WHETHER NATIVE OR TRANSPLANTED HEART: ICD-10-CM

## 2024-12-10 DIAGNOSIS — R33.9 URINARY RETENTION: ICD-10-CM

## 2024-12-10 DIAGNOSIS — M62.838 MUSCLE SPASMS OF BOTH LOWER EXTREMITIES: ICD-10-CM

## 2024-12-10 DIAGNOSIS — G89.29 CHRONIC INTRACTABLE PAIN: ICD-10-CM

## 2024-12-10 DIAGNOSIS — G62.9 PERIPHERAL POLYNEUROPATHY: ICD-10-CM

## 2024-12-10 DIAGNOSIS — I50.9 CHRONIC CONGESTIVE HEART FAILURE, UNSPECIFIED HEART FAILURE TYPE (HCC): ICD-10-CM

## 2024-12-10 PROBLEM — I50.22 CHRONIC SYSTOLIC HEART FAILURE (HCC): Status: ACTIVE | Noted: 2024-12-10

## 2024-12-10 RX ORDER — LOSARTAN POTASSIUM 50 MG/1
50 TABLET ORAL DAILY
Start: 2024-12-10 | End: 2025-01-09

## 2024-12-11 RX ORDER — GABAPENTIN 800 MG/1
800 TABLET ORAL 4 TIMES DAILY
Qty: 120 TABLET | Refills: 0 | Status: SHIPPED | OUTPATIENT
Start: 2024-12-11

## 2024-12-11 RX ORDER — DOCUSATE SODIUM 100 MG/1
100 CAPSULE, LIQUID FILLED ORAL 2 TIMES DAILY PRN
Qty: 90 CAPSULE | Refills: 0 | Status: SHIPPED | OUTPATIENT
Start: 2024-12-11

## 2024-12-11 RX ORDER — BUMETANIDE 1 MG/1
1 TABLET ORAL 2 TIMES DAILY
Qty: 60 TABLET | Refills: 0 | Status: SHIPPED | OUTPATIENT
Start: 2024-12-11

## 2024-12-11 RX ORDER — OXYCODONE AND ACETAMINOPHEN 5; 325 MG/1; MG/1
2 TABLET ORAL EVERY 4 HOURS PRN
Qty: 360 TABLET | Refills: 0 | Status: SHIPPED | OUTPATIENT
Start: 2024-12-11

## 2024-12-11 RX ORDER — TIZANIDINE 2 MG/1
2 TABLET ORAL EVERY 8 HOURS PRN
Qty: 90 TABLET | Refills: 0 | Status: SHIPPED | OUTPATIENT
Start: 2024-12-11

## 2024-12-11 RX ORDER — ATORVASTATIN CALCIUM 20 MG/1
20 TABLET, FILM COATED ORAL EVERY EVENING
Qty: 90 TABLET | Refills: 0 | Status: SHIPPED | OUTPATIENT
Start: 2024-12-11

## 2024-12-11 RX ORDER — TAMSULOSIN HYDROCHLORIDE 0.4 MG/1
0.8 CAPSULE ORAL
Qty: 60 CAPSULE | Refills: 0 | Status: SHIPPED | OUTPATIENT
Start: 2024-12-11

## 2024-12-24 DIAGNOSIS — I48.91 ATRIAL FIBRILLATION WITH RVR (HCC): ICD-10-CM

## 2024-12-24 RX ORDER — APIXABAN 5 MG/1
5 TABLET, FILM COATED ORAL 2 TIMES DAILY
Qty: 60 TABLET | Refills: 5 | Status: SHIPPED | OUTPATIENT
Start: 2024-12-24

## 2024-12-31 ENCOUNTER — TELEPHONE (OUTPATIENT)
Age: 64
End: 2024-12-31

## 2024-12-31 NOTE — TELEPHONE ENCOUNTER
Dr. Gonzalez calling from [employer] [facility] in regard to the wheelchair authorization request that was put in urgently. [phone], power wheelchair approved on the 28th. Regular wheelchair was submitted, does he need both wheelchairs? Can you review and call Dr. Gonzalez back. If there is no clinical information given back by 4pm it will be denied, but can be appealed, however does have a power wheelchair that was approved.

## 2025-01-07 DIAGNOSIS — K59.00 CONSTIPATION, UNSPECIFIED CONSTIPATION TYPE: ICD-10-CM

## 2025-01-07 DIAGNOSIS — G89.29 CHRONIC INTRACTABLE PAIN: ICD-10-CM

## 2025-01-07 DIAGNOSIS — E87.1 HYPONATREMIA: ICD-10-CM

## 2025-01-07 DIAGNOSIS — I48.91 ATRIAL FIBRILLATION WITH RVR (HCC): ICD-10-CM

## 2025-01-07 DIAGNOSIS — I25.10 ATHEROSCLEROSIS OF NATIVE CORONARY ARTERY WITHOUT ANGINA PECTORIS, UNSPECIFIED WHETHER NATIVE OR TRANSPLANTED HEART: ICD-10-CM

## 2025-01-07 DIAGNOSIS — R33.9 URINARY RETENTION: ICD-10-CM

## 2025-01-07 DIAGNOSIS — R52 DIFFUSE PAIN: ICD-10-CM

## 2025-01-07 DIAGNOSIS — E11.65 UNCONTROLLED TYPE 2 DIABETES MELLITUS WITH HYPERGLYCEMIA (HCC): ICD-10-CM

## 2025-01-07 DIAGNOSIS — Z79.4 TYPE 2 DIABETES MELLITUS WITH HYPERGLYCEMIA, WITH LONG-TERM CURRENT USE OF INSULIN (HCC): ICD-10-CM

## 2025-01-07 DIAGNOSIS — E11.65 TYPE 2 DIABETES MELLITUS WITH HYPERGLYCEMIA, WITH LONG-TERM CURRENT USE OF INSULIN (HCC): ICD-10-CM

## 2025-01-07 DIAGNOSIS — I50.9 CHRONIC CONGESTIVE HEART FAILURE, UNSPECIFIED HEART FAILURE TYPE (HCC): ICD-10-CM

## 2025-01-07 RX ORDER — DOCUSATE SODIUM 100 MG/1
100 CAPSULE, LIQUID FILLED ORAL 2 TIMES DAILY PRN
Qty: 90 CAPSULE | Refills: 5 | Status: SHIPPED | OUTPATIENT
Start: 2025-01-07

## 2025-01-07 RX ORDER — SODIUM CHLORIDE 1 G/1
1 TABLET ORAL EVERY MORNING
Qty: 30 TABLET | Refills: 0 | Status: SHIPPED | OUTPATIENT
Start: 2025-01-07 | End: 2025-01-15 | Stop reason: SDUPTHER

## 2025-01-07 RX ORDER — UBIQUINOL 100 MG
CAPSULE ORAL 4 TIMES DAILY
Qty: 100 EACH | Refills: 5 | Status: SHIPPED | OUTPATIENT
Start: 2025-01-07

## 2025-01-07 RX ORDER — TAMSULOSIN HYDROCHLORIDE 0.4 MG/1
0.8 CAPSULE ORAL
Qty: 60 CAPSULE | Refills: 5 | Status: SHIPPED | OUTPATIENT
Start: 2025-01-07

## 2025-01-07 RX ORDER — ATORVASTATIN CALCIUM 20 MG/1
20 TABLET, FILM COATED ORAL EVERY EVENING
Qty: 90 TABLET | Refills: 5 | Status: SHIPPED | OUTPATIENT
Start: 2025-01-07

## 2025-01-07 RX ORDER — OXYCODONE AND ACETAMINOPHEN 5; 325 MG/1; MG/1
2 TABLET ORAL EVERY 4 HOURS PRN
Qty: 360 TABLET | Refills: 0 | Status: SHIPPED | OUTPATIENT
Start: 2025-01-07

## 2025-01-07 RX ORDER — PEN NEEDLE, DIABETIC, SAFETY 30 GX3/16"
NEEDLE, DISPOSABLE MISCELLANEOUS
Qty: 150 EACH | Refills: 5 | Status: SHIPPED | OUTPATIENT
Start: 2025-01-07

## 2025-01-07 RX ORDER — PEN NEEDLE, DIABETIC 31 GX5/16"
NEEDLE, DISPOSABLE MISCELLANEOUS
Qty: 100 EACH | Refills: 6 | Status: SHIPPED | OUTPATIENT
Start: 2025-01-07

## 2025-01-07 RX ORDER — BUMETANIDE 1 MG/1
1 TABLET ORAL 2 TIMES DAILY
Qty: 60 TABLET | Refills: 5 | Status: SHIPPED | OUTPATIENT
Start: 2025-01-07

## 2025-01-09 RX ORDER — DIGOXIN 250 MCG
250 TABLET ORAL DAILY
Qty: 90 TABLET | Refills: 0 | Status: SHIPPED | OUTPATIENT
Start: 2025-01-09

## 2025-01-15 ENCOUNTER — IN HOME VISIT (OUTPATIENT)
Age: 65
End: 2025-01-15

## 2025-01-15 VITALS
DIASTOLIC BLOOD PRESSURE: 84 MMHG | OXYGEN SATURATION: 99 % | SYSTOLIC BLOOD PRESSURE: 138 MMHG | RESPIRATION RATE: 18 BRPM | HEART RATE: 84 BPM

## 2025-01-15 DIAGNOSIS — I50.9 CHRONIC CONGESTIVE HEART FAILURE, UNSPECIFIED HEART FAILURE TYPE (HCC): ICD-10-CM

## 2025-01-15 DIAGNOSIS — I10 BENIGN ESSENTIAL HYPERTENSION: ICD-10-CM

## 2025-01-15 DIAGNOSIS — I48.20 CHRONIC A-FIB (HCC): ICD-10-CM

## 2025-01-15 DIAGNOSIS — E11.8 TYPE 2 DIABETES MELLITUS WITH COMPLICATION, WITH LONG-TERM CURRENT USE OF INSULIN (HCC): Primary | ICD-10-CM

## 2025-01-15 DIAGNOSIS — Z79.4 TYPE 2 DIABETES MELLITUS WITH COMPLICATION, WITH LONG-TERM CURRENT USE OF INSULIN (HCC): Primary | ICD-10-CM

## 2025-01-15 DIAGNOSIS — E87.1 HYPONATREMIA: ICD-10-CM

## 2025-01-15 PROCEDURE — 99349 HOME/RES VST EST MOD MDM 40: CPT | Performed by: NURSE PRACTITIONER

## 2025-01-15 RX ORDER — SODIUM CHLORIDE 1 G/1
1 TABLET ORAL EVERY MORNING
Qty: 30 TABLET | Refills: 3 | Status: SHIPPED | OUTPATIENT
Start: 2025-01-15 | End: 2025-01-22 | Stop reason: SDUPTHER

## 2025-01-15 NOTE — PROGRESS NOTES
Diabetic Foot Exam    Patient's shoes and socks removed.    Right Foot/Ankle   Right Foot Inspection  Skin Exam: skin normal and skin intact. No dry skin, no warmth, no callus, no erythema, no maceration, no abnormal color, no pre-ulcer, no ulcer and no callus.     Toe Exam: ROM and strength within normal limits.     Sensory   Monofilament testing: intact    Vascular  The right DP pulse is 1+. The right PT pulse is 1+.     Left Foot/Ankle  Left Foot Inspection  Skin Exam: skin normal and skin intact. No dry skin, no warmth, no erythema, no maceration, normal color, no pre-ulcer, no ulcer and no callus.     Toe Exam: ROM and strength within normal limits.     Sensory   Monofilament testing: intact    Vascular  The left DP pulse is 1+. The left PT pulse is 1+.     Assign Risk Category  No deformity present  No loss of protective sensation  Weak pulses  Risk: 0  Assessment/Plan:      Diagnoses and all orders for this visit:    Type 2 diabetes mellitus with complication, with long-term current use of insulin ([facility])  -continue insulin lispro and lantus  -reeducated to take medication as directed  -patient not compliant with insulin orders and changes the dosage as he desires despite education    Chronic congestive heart failure, unspecified heart failure type (HCC)  -chronic, stable  -continue bumex BID  -trace edema of left foot fluctuates    Hyponatremia  -chronic, stable  -continue NaCL tablets as prescribed  -last Na level 12/9/24 was 133    Essential hypertension  -chronic , stable  -continue toprol XL  -patient stopped taking losartan     Chronic a-fib (HCC)  -chronic, stable  -continue eliquis and digoxin          Subjective:     Patient ID: Alonzo Watson is a 64 y.o. male.    Patient is a 63yo homebound male seen today for exam and blood draw.  Unable to draw blood today.  Greater than 40 minutes spent with patient on detailed history, physical exam and assessment, planning and diagnosing and diabetic education.           Review of Systems   Constitutional: Negative.    HENT: Negative.     Eyes: Negative.    Respiratory: Negative.     Cardiovascular:  Positive for leg swelling.   Gastrointestinal: Negative.    Endocrine: Negative.    Genitourinary: Negative.    Musculoskeletal:  Positive for back pain and myalgias.   Skin: Negative.    Allergic/Immunologic: Negative.    Neurological: Negative.    Hematological: Negative.    Psychiatric/Behavioral: Negative.           Objective:     Physical Exam  Constitutional:       General: He is not in acute distress.     Appearance: He is obese. He is not ill-appearing, toxic-appearing or diaphoretic.   HENT:      Head: Normocephalic and atraumatic.      Right Ear: External ear normal.      Left Ear: External ear normal.      Nose: Nose normal. No congestion.      Mouth/Throat:      Mouth: Mucous membranes are moist.      Pharynx: Oropharynx is clear.   Eyes:      General:         Right eye: No discharge.         Left eye: No discharge.      Conjunctiva/sclera: Conjunctivae normal.   Cardiovascular:      Rate and Rhythm: Normal rate and regular rhythm.      Pulses: Pulses are weak.           Dorsalis pedis pulses are 1+ on the right side and 1+ on the left side.        Posterior tibial pulses are 1+ on the right side and 1+ on the left side.      Heart sounds: Normal heart sounds.   Pulmonary:      Effort: Pulmonary effort is normal. No respiratory distress.      Breath sounds: Normal breath sounds. No wheezing, rhonchi or rales.   Abdominal:      General: Bowel sounds are normal.      Tenderness: There is no abdominal tenderness. There is no guarding.   Musculoskeletal:      Cervical back: Normal range of motion. No rigidity.      Right lower leg: No edema.      Left lower leg: Edema present.      Comments: Trace left foot edema   Feet:      Right foot:      Skin integrity: No ulcer, skin breakdown, erythema, warmth, callus or dry skin.      Left foot:      Skin integrity: No ulcer,  skin breakdown, erythema, warmth, callus or dry skin.   Lymphadenopathy:      Cervical: No cervical adenopathy.   Skin:     General: Skin is warm and dry.      Comments: Self inflicted scratches in lower back area  No signs of infection   Neurological:      Mental Status: He is alert and oriented to person, place, and time.      Motor: Weakness present.      Gait: Gait abnormal.   Psychiatric:         Mood and Affect: Mood normal.         Behavior: Behavior normal.         Thought Content: Thought content normal.

## 2025-01-22 ENCOUNTER — PATIENT MESSAGE (OUTPATIENT)
Age: 65
End: 2025-01-22

## 2025-01-22 DIAGNOSIS — I48.91 ATRIAL FIBRILLATION WITH RVR (HCC): ICD-10-CM

## 2025-01-22 DIAGNOSIS — Z79.4 TYPE 2 DIABETES MELLITUS WITH COMPLICATION, WITH LONG-TERM CURRENT USE OF INSULIN (HCC): ICD-10-CM

## 2025-01-22 DIAGNOSIS — F99 PSYCHIATRIC DISORDER: ICD-10-CM

## 2025-01-22 DIAGNOSIS — E87.1 HYPONATREMIA: ICD-10-CM

## 2025-01-22 DIAGNOSIS — I25.10 ATHEROSCLEROSIS OF NATIVE CORONARY ARTERY WITHOUT ANGINA PECTORIS, UNSPECIFIED WHETHER NATIVE OR TRANSPLANTED HEART: ICD-10-CM

## 2025-01-22 DIAGNOSIS — Z79.4 TYPE 2 DIABETES MELLITUS WITH HYPERGLYCEMIA, WITH LONG-TERM CURRENT USE OF INSULIN (HCC): ICD-10-CM

## 2025-01-22 DIAGNOSIS — E11.8 TYPE 2 DIABETES MELLITUS WITH COMPLICATION, WITH LONG-TERM CURRENT USE OF INSULIN (HCC): ICD-10-CM

## 2025-01-22 DIAGNOSIS — G62.9 PERIPHERAL POLYNEUROPATHY: ICD-10-CM

## 2025-01-22 DIAGNOSIS — E11.65 TYPE 2 DIABETES MELLITUS WITH HYPERGLYCEMIA, WITH LONG-TERM CURRENT USE OF INSULIN (HCC): ICD-10-CM

## 2025-01-22 DIAGNOSIS — E11.65 UNCONTROLLED TYPE 2 DIABETES MELLITUS WITH HYPERGLYCEMIA (HCC): ICD-10-CM

## 2025-01-22 RX ORDER — PEN NEEDLE, DIABETIC 31 GX5/16"
NEEDLE, DISPOSABLE MISCELLANEOUS
Qty: 100 EACH | Refills: 0 | Status: SHIPPED | OUTPATIENT
Start: 2025-01-22 | End: 2025-01-22 | Stop reason: SDUPTHER

## 2025-01-22 RX ORDER — SODIUM CHLORIDE 1 G/1
1 TABLET ORAL EVERY MORNING
Qty: 30 TABLET | Refills: 0 | Status: SHIPPED | OUTPATIENT
Start: 2025-01-22 | End: 2025-01-22 | Stop reason: SDUPTHER

## 2025-01-22 RX ORDER — ATORVASTATIN CALCIUM 20 MG/1
20 TABLET, FILM COATED ORAL EVERY EVENING
Qty: 90 TABLET | Refills: 0 | Status: SHIPPED | OUTPATIENT
Start: 2025-01-22 | End: 2025-01-22 | Stop reason: SDUPTHER

## 2025-01-22 RX ORDER — SODIUM CHLORIDE 1 G/1
1 TABLET ORAL EVERY MORNING
Qty: 30 TABLET | Refills: 3 | Status: SHIPPED | OUTPATIENT
Start: 2025-01-22

## 2025-01-22 RX ORDER — UBIQUINOL 100 MG
CAPSULE ORAL 4 TIMES DAILY
Qty: 100 EACH | Refills: 0 | Status: SHIPPED | OUTPATIENT
Start: 2025-01-22 | End: 2025-01-22 | Stop reason: SDUPTHER

## 2025-01-22 RX ORDER — INSULIN LISPRO 200 [IU]/ML
15 INJECTION, SOLUTION SUBCUTANEOUS
Qty: 6 ML | Refills: 3 | Status: SHIPPED | OUTPATIENT
Start: 2025-01-22

## 2025-01-22 RX ORDER — UBIQUINOL 100 MG
CAPSULE ORAL 4 TIMES DAILY
Qty: 100 EACH | Refills: 3 | Status: SHIPPED | OUTPATIENT
Start: 2025-01-22

## 2025-01-22 RX ORDER — BUSPIRONE HYDROCHLORIDE 10 MG/1
10 TABLET ORAL 2 TIMES DAILY
Qty: 60 TABLET | Refills: 3 | Status: SHIPPED | OUTPATIENT
Start: 2025-01-22

## 2025-01-22 RX ORDER — INSULIN LISPRO 200 [IU]/ML
15 INJECTION, SOLUTION SUBCUTANEOUS
Qty: 6 ML | Refills: 0 | Status: SHIPPED | OUTPATIENT
Start: 2025-01-22 | End: 2025-01-22 | Stop reason: SDUPTHER

## 2025-01-22 RX ORDER — INSULIN LISPRO 200 [IU]/ML
15 INJECTION, SOLUTION SUBCUTANEOUS
Qty: 6 ML | Refills: 0 | OUTPATIENT
Start: 2025-01-22

## 2025-01-22 RX ORDER — PEN NEEDLE, DIABETIC 31 GX5/16"
NEEDLE, DISPOSABLE MISCELLANEOUS
Qty: 100 EACH | Refills: 3 | Status: SHIPPED | OUTPATIENT
Start: 2025-01-22

## 2025-01-22 RX ORDER — GABAPENTIN 800 MG/1
800 TABLET ORAL 4 TIMES DAILY
Qty: 120 TABLET | Refills: 3 | Status: SHIPPED | OUTPATIENT
Start: 2025-01-22

## 2025-01-22 RX ORDER — ATORVASTATIN CALCIUM 20 MG/1
20 TABLET, FILM COATED ORAL EVERY EVENING
Qty: 90 TABLET | Refills: 3 | Status: SHIPPED | OUTPATIENT
Start: 2025-01-22

## 2025-01-22 RX ORDER — GABAPENTIN 800 MG/1
800 TABLET ORAL 4 TIMES DAILY
Qty: 120 TABLET | Refills: 0 | Status: SHIPPED | OUTPATIENT
Start: 2025-01-22 | End: 2025-01-22 | Stop reason: SDUPTHER

## 2025-01-29 DIAGNOSIS — G62.9 PERIPHERAL POLYNEUROPATHY: ICD-10-CM

## 2025-01-29 RX ORDER — GABAPENTIN 800 MG/1
800 TABLET ORAL 4 TIMES DAILY
Qty: 120 TABLET | Refills: 3 | Status: SHIPPED | OUTPATIENT
Start: 2025-01-29

## 2025-02-05 DIAGNOSIS — G89.29 CHRONIC INTRACTABLE PAIN: ICD-10-CM

## 2025-02-05 RX ORDER — OXYCODONE AND ACETAMINOPHEN 5; 325 MG/1; MG/1
2 TABLET ORAL EVERY 4 HOURS PRN
Qty: 360 TABLET | Refills: 0 | Status: SHIPPED | OUTPATIENT
Start: 2025-02-05

## 2025-02-11 ENCOUNTER — IN HOME VISIT (OUTPATIENT)
Age: 65
End: 2025-02-11

## 2025-02-11 VITALS
OXYGEN SATURATION: 97 % | HEART RATE: 62 BPM | RESPIRATION RATE: 16 BRPM | SYSTOLIC BLOOD PRESSURE: 140 MMHG | TEMPERATURE: 98.2 F | DIASTOLIC BLOOD PRESSURE: 70 MMHG

## 2025-02-11 DIAGNOSIS — Z79.4 TYPE 2 DIABETES MELLITUS WITH COMPLICATION, WITH LONG-TERM CURRENT USE OF INSULIN (HCC): ICD-10-CM

## 2025-02-11 DIAGNOSIS — C91.11 CHRONIC LYMPHOCYTIC LEUKEMIA OF B-CELL TYPE IN REMISSION (HCC): ICD-10-CM

## 2025-02-11 DIAGNOSIS — E11.42 DIABETIC POLYNEUROPATHY ASSOCIATED WITH TYPE 2 DIABETES MELLITUS (HCC): ICD-10-CM

## 2025-02-11 DIAGNOSIS — E11.8 TYPE 2 DIABETES MELLITUS WITH COMPLICATION, WITH LONG-TERM CURRENT USE OF INSULIN (HCC): ICD-10-CM

## 2025-02-11 DIAGNOSIS — I10 BENIGN ESSENTIAL HYPERTENSION: ICD-10-CM

## 2025-02-11 DIAGNOSIS — G89.29 CHRONIC INTRACTABLE PAIN: Primary | ICD-10-CM

## 2025-02-11 DIAGNOSIS — F31.9 BIPOLAR AFFECTIVE DISORDER, REMISSION STATUS UNSPECIFIED (HCC): ICD-10-CM

## 2025-02-11 DIAGNOSIS — E87.1 HYPONATREMIA: ICD-10-CM

## 2025-02-11 DIAGNOSIS — I50.9 CHRONIC CONGESTIVE HEART FAILURE, UNSPECIFIED HEART FAILURE TYPE (HCC): ICD-10-CM

## 2025-02-11 DIAGNOSIS — I48.20 CHRONIC A-FIB (HCC): ICD-10-CM

## 2025-02-11 PROBLEM — L03.116 CELLULITIS OF LEFT FOOT: Status: RESOLVED | Noted: 2024-09-27 | Resolved: 2025-02-11

## 2025-02-11 PROBLEM — R33.9 URINARY RETENTION: Status: RESOLVED | Noted: 2024-08-18 | Resolved: 2025-02-11

## 2025-02-11 PROCEDURE — 99349 HOME/RES VST EST MOD MDM 40: CPT | Performed by: NURSE PRACTITIONER

## 2025-02-11 RX ORDER — BUSPIRONE HYDROCHLORIDE 15 MG/1
15 TABLET ORAL 2 TIMES DAILY
COMMUNITY
Start: 2025-02-07

## 2025-02-11 RX ORDER — BLOOD SUGAR DIAGNOSTIC
STRIP MISCELLANEOUS
Qty: 400 EACH | Refills: 0 | Status: SHIPPED | OUTPATIENT
Start: 2025-02-11

## 2025-02-11 RX ORDER — METFORMIN HYDROCHLORIDE 500 MG/1
500 TABLET, EXTENDED RELEASE ORAL 2 TIMES DAILY WITH MEALS
Qty: 60 TABLET | Refills: 3 | Status: SHIPPED | OUTPATIENT
Start: 2025-02-11

## 2025-02-11 NOTE — PROGRESS NOTES
Diabetic Foot Exam    Patient's shoes and socks removed.    Right Foot/Ankle   Right Foot Inspection  Skin Exam: skin normal and skin intact. No dry skin, no warmth, no callus, no erythema, no maceration, no abnormal color, no pre-ulcer, no ulcer and no callus.     Toe Exam: ROM and strength within normal limits.     Sensory   Monofilament testing: intact    Vascular  The right DP pulse is 2+. The right PT pulse is 2+.     Left Foot/Ankle  Left Foot Inspection  Skin Exam: skin normal and skin intact. No dry skin, no warmth, no erythema, no maceration, normal color, no pre-ulcer, no ulcer and no callus.     Toe Exam: ROM and strength within normal limits.     Sensory   Monofilament testing: intact    Vascular  The left DP pulse is 2+. The left PT pulse is 2+.     Assign Risk Category  No deformity present  No loss of protective sensation  No weak pulses  Risk: 0  Assessment/Plan:      Diagnoses and all orders for this visit:    Chronic intractable pain  -patient continues to use percocet with muscle relaxant to manage pain  -he does not abuse or misuse his percocet and it assists with mobility and comfort therefore benefits outweigh risk of taking the medication  -     tiZANidine (ZANAFLEX) 4 mg tablet; Take 1 tablet (4 mg total) by mouth every 6 (six) hours as needed for muscle spasms    Diabetic polyneuropathy associated with type 2 diabetes mellitus (HCC)  -reeducated on acceptable blood sugars  -patient continues to believe that his blood sugars should always be over 200 despite education  -stated higher lately and educated on diet  -add  -     metFORMIN (GLUCOPHAGE-XR) 500 mg 24 hr tablet; Take 1 tablet (500 mg total) by mouth 2 (two) times a day with meals    Essential hypertension  -chronic, stable  -continue med management    Chronic a-fib (HCC)  -chronic, stable  -continue eliquis    Chronic congestive heart failure, unspecified heart failure type (HCC)  -chronic , stable  -no LE edema today  -educated on  and to continue medication management      Bipolar affective disorder, remission status unspecified (HCC)  -chronic, stable  -followed virtually by psych  -continue medicine management and therapy    Chronic lymphocytic leukemia of B-cell type in remission (HCC)  -chronic, stable    Hyponatremia  -continue NaCl tablets    Other orders  -     busPIRone (BUSPAR) 15 mg tablet; Take 15 mg by mouth 2 (two) times a day          Subjective:     Patient ID: Alonzo Watson is a 64 y.o. male.    Patient seen at home today for evaluation.  He is homebound due to intractable back pain and multiple comorbidites.  >40 minutes spent with Alonzo on detailed history, physical exam and assessment, planning and diagnosing and education.          Review of Systems   Constitutional: Negative.    HENT: Negative.     Eyes: Negative.    Respiratory: Negative.     Cardiovascular: Negative.    Gastrointestinal: Negative.    Endocrine: Negative.    Genitourinary: Negative.    Musculoskeletal:  Positive for arthralgias, back pain, gait problem and myalgias.   Skin: Negative.    Allergic/Immunologic: Negative.    Neurological:  Positive for weakness.   Hematological: Negative.    Psychiatric/Behavioral: Negative.           Objective:     Physical Exam  Constitutional:       General: He is not in acute distress.     Appearance: He is obese. He is not ill-appearing, toxic-appearing or diaphoretic.   HENT:      Head: Normocephalic and atraumatic.      Right Ear: External ear normal.      Left Ear: External ear normal.      Nose: Nose normal. No congestion.      Mouth/Throat:      Mouth: Mucous membranes are moist.      Pharynx: Oropharynx is clear.   Eyes:      General:         Right eye: No discharge.         Left eye: No discharge.      Conjunctiva/sclera: Conjunctivae normal.   Cardiovascular:      Rate and Rhythm: Normal rate and regular rhythm.      Pulses: Normal pulses. no weak pulses.           Dorsalis pedis pulses are 2+ on the right side  and 2+ on the left side.        Posterior tibial pulses are 2+ on the right side and 2+ on the left side.      Heart sounds: Normal heart sounds.   Pulmonary:      Effort: Pulmonary effort is normal.      Breath sounds: Normal breath sounds. No wheezing or rales.   Abdominal:      General: Bowel sounds are normal.      Palpations: Abdomen is soft.      Tenderness: There is no abdominal tenderness. There is no guarding.   Musculoskeletal:         General: No deformity.      Cervical back: Normal range of motion. No rigidity.      Right lower leg: No edema.      Left lower leg: No edema.   Feet:      Right foot:      Skin integrity: No ulcer, skin breakdown, erythema, warmth, callus or dry skin.      Left foot:      Skin integrity: No ulcer, skin breakdown, erythema, warmth, callus or dry skin.   Lymphadenopathy:      Cervical: No cervical adenopathy.   Skin:     General: Skin is warm and dry.      Findings: No bruising or lesion.   Neurological:      Mental Status: He is alert and oriented to person, place, and time. Mental status is at baseline.      Motor: Weakness present.      Gait: Gait abnormal.   Psychiatric:         Mood and Affect: Mood normal.         Behavior: Behavior normal.         Thought Content: Thought content normal.

## 2025-03-06 DIAGNOSIS — J43.2 CENTRILOBULAR EMPHYSEMA (HCC): ICD-10-CM

## 2025-03-06 DIAGNOSIS — G89.29 CHRONIC INTRACTABLE PAIN: ICD-10-CM

## 2025-03-07 RX ORDER — FLUTICASONE FUROATE, UMECLIDINIUM BROMIDE AND VILANTEROL TRIFENATATE 100; 62.5; 25 UG/1; UG/1; UG/1
1 POWDER RESPIRATORY (INHALATION) DAILY
Qty: 28 EACH | Refills: 0 | Status: SHIPPED | OUTPATIENT
Start: 2025-03-07

## 2025-03-07 RX ORDER — OXYCODONE AND ACETAMINOPHEN 5; 325 MG/1; MG/1
2 TABLET ORAL EVERY 4 HOURS PRN
Qty: 360 TABLET | Refills: 0 | Status: SHIPPED | OUTPATIENT
Start: 2025-03-07

## 2025-03-13 ENCOUNTER — IN HOME VISIT (OUTPATIENT)
Age: 65
End: 2025-03-13

## 2025-03-13 VITALS
SYSTOLIC BLOOD PRESSURE: 144 MMHG | RESPIRATION RATE: 18 BRPM | DIASTOLIC BLOOD PRESSURE: 78 MMHG | TEMPERATURE: 98 F | HEART RATE: 74 BPM | OXYGEN SATURATION: 97 %

## 2025-03-13 DIAGNOSIS — Z79.4 TYPE 2 DIABETES MELLITUS WITH COMPLICATION, WITH LONG-TERM CURRENT USE OF INSULIN (HCC): ICD-10-CM

## 2025-03-13 DIAGNOSIS — Z00.01 ENCOUNTER FOR GENERAL ADULT MEDICAL EXAMINATION WITH ABNORMAL FINDINGS: ICD-10-CM

## 2025-03-13 DIAGNOSIS — J42 CHRONIC BRONCHITIS, UNSPECIFIED CHRONIC BRONCHITIS TYPE (HCC): ICD-10-CM

## 2025-03-13 DIAGNOSIS — I48.20 CHRONIC A-FIB (HCC): ICD-10-CM

## 2025-03-13 DIAGNOSIS — I50.9 CHRONIC CONGESTIVE HEART FAILURE, UNSPECIFIED HEART FAILURE TYPE (HCC): ICD-10-CM

## 2025-03-13 DIAGNOSIS — I10 BENIGN ESSENTIAL HYPERTENSION: ICD-10-CM

## 2025-03-13 DIAGNOSIS — E87.1 HYPONATREMIA: ICD-10-CM

## 2025-03-13 DIAGNOSIS — E11.8 TYPE 2 DIABETES MELLITUS WITH COMPLICATION, WITH LONG-TERM CURRENT USE OF INSULIN (HCC): ICD-10-CM

## 2025-03-13 DIAGNOSIS — G89.29 CHRONIC INTRACTABLE PAIN: Primary | ICD-10-CM

## 2025-03-13 PROCEDURE — G0439 PPPS, SUBSEQ VISIT: HCPCS | Performed by: NURSE PRACTITIONER

## 2025-03-13 RX ORDER — LOSARTAN POTASSIUM 50 MG/1
50 TABLET ORAL DAILY
COMMUNITY
Start: 2025-02-28

## 2025-03-13 NOTE — ASSESSMENT & PLAN NOTE
Continue percocet as ordered and educated on side effects and dosage  Patient does not abuse or misuse the percocet  Tizanidine increased to reduce muscle spasms  Continue TENS unit to back  Referral for home PT

## 2025-03-13 NOTE — ASSESSMENT & PLAN NOTE
Wt Readings from Last 3 Encounters:   09/24/24 109 kg (240 lb)   08/25/24 107 kg (235 lb 3.7 oz)   08/22/24 106 kg (234 lb)   No recent weights  No evidence of fluid overload  Continue bumex

## 2025-03-13 NOTE — ASSESSMENT & PLAN NOTE
Lab Results   Component Value Date    HGBA1C 10.7 (H) 12/09/2024   Average BG in 1 week 280  Patient not compliant with diet or medication orders  Educated again on using insulin as ordered and ideal blood glucose as well as following diabetic diet

## 2025-03-13 NOTE — PROGRESS NOTES
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      Left lower leg: No edema.   Feet:      Right foot:      Skin integrity: No ulcer, skin breakdown, erythema, warmth, callus or dry skin.      Left foot:      Skin integrity: No ulcer, skin breakdown, erythema, warmth, callus or dry skin.   Lymphadenopathy:      Cervical: No cervical adenopathy.   Skin:     General: Skin is warm and dry.      Findings: No bruising, erythema, lesion or rash.   Neurological:      Mental Status: He is alert and oriented to person, place, and time. Mental status is at baseline.      Motor: No weakness.      Gait: Gait abnormal.   Psychiatric:         Mood and Affect: Mood normal.         Behavior: Behavior normal.         Thought Content: Thought content normal.         Judgment: Judgment normal.          Tuesday MANAS [facility]

## 2025-03-20 ENCOUNTER — HOSPITAL ENCOUNTER (EMERGENCY)
Facility: HOSPITAL | Age: 65
Discharge: HOME | End: 2025-03-20
Attending: EMERGENCY MEDICINE
Payer: COMMERCIAL

## 2025-03-20 ENCOUNTER — APPOINTMENT (EMERGENCY)
Dept: RADIOLOGY | Facility: HOSPITAL | Age: 65
End: 2025-03-20
Payer: COMMERCIAL

## 2025-03-20 ENCOUNTER — IN HOME VISIT (OUTPATIENT)
Age: 65
End: 2025-03-20

## 2025-03-20 VITALS
HEART RATE: 92 BPM | OXYGEN SATURATION: 98 % | SYSTOLIC BLOOD PRESSURE: 140 MMHG | DIASTOLIC BLOOD PRESSURE: 84 MMHG | TEMPERATURE: 99.9 F | RESPIRATION RATE: 18 BRPM

## 2025-03-20 VITALS
OXYGEN SATURATION: 99 % | RESPIRATION RATE: 20 BRPM | SYSTOLIC BLOOD PRESSURE: 192 MMHG | TEMPERATURE: 98.7 F | DIASTOLIC BLOOD PRESSURE: 97 MMHG | HEART RATE: 91 BPM

## 2025-03-20 DIAGNOSIS — M54.2 NECK PAIN: ICD-10-CM

## 2025-03-20 DIAGNOSIS — J98.8 RESPIRATORY INFECTION: Primary | ICD-10-CM
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
## 2025-03-20 LAB
2HR DELTA HS TROPONIN: 3 NG/L
ALBUMIN SERPL BCG-MCNC: 4.6 G/DL (ref 3.5–5)
ALP SERPL-CCNC: 76 U/L (ref 34–104)
ALT SERPL W P-5'-P-CCNC: 15 U/L (ref 7–52)
ANION GAP SERPL CALCULATED.3IONS-SCNC: 17 MMOL/L (ref 4–13)
AST SERPL W P-5'-P-CCNC: 15 U/L (ref 13–39)
BASOPHILS # BLD AUTO: 0.06 THOUSANDS/ÂΜL (ref 0–0.1)
BASOPHILS NFR BLD AUTO: 0 % (ref 0–1)
BILIRUB SERPL-MCNC: 0.85 MG/DL (ref 0.2–1)
BUN SERPL-MCNC: 25 MG/DL (ref 5–25)
CALCIUM SERPL-MCNC: 9.8 MG/DL (ref 8.4–10.2)
CARDIAC TROPONIN I PNL SERPL HS: 12 NG/L (ref ?–50)
CARDIAC TROPONIN I PNL SERPL HS: 15 NG/L (ref ?–50)
CHLORIDE SERPL-SCNC: 97 MMOL/L (ref 96–108)
CO2 SERPL-SCNC: 21 MMOL/L (ref 21–32)
CREAT SERPL-MCNC: 0.89 MG/DL (ref 0.6–1.3)
EOSINOPHIL # BLD AUTO: 0.06 THOUSAND/ÂΜL (ref 0–0.61)
EOSINOPHIL NFR BLD AUTO: 0 % (ref 0–6)
ERYTHROCYTE [DISTWIDTH] IN BLOOD BY AUTOMATED COUNT: 12.6 % (ref 11.6–15.1)
GFR SERPL CREATININE-BSD FRML MDRD: 89 ML/MIN/1.73SQ M
GLUCOSE SERPL-MCNC: 307 MG/DL (ref 65–140)
HCT VFR BLD AUTO: 42.4 % (ref 36.5–49.3)
HGB BLD-MCNC: 14.8 G/DL (ref 12–17)
IMM GRANULOCYTES # BLD AUTO: 0.09 THOUSAND/UL (ref 0–0.2)
IMM GRANULOCYTES NFR BLD AUTO: 0 % (ref 0–2)
LYMPHOCYTES # BLD AUTO: 20.81 THOUSANDS/ÂΜL (ref 0.6–4.47)
LYMPHOCYTES NFR BLD AUTO: 71 % (ref 14–44)
MCH RBC QN AUTO: 31.2 PG (ref 26.8–34.3)
MCHC RBC AUTO-ENTMCNC: 34.9 G/DL (ref 31.4–37.4)
MCV RBC AUTO: 90 FL (ref 82–98)
MONOCYTES # BLD AUTO: 0.64 THOUSAND/ÂΜL (ref 0.17–1.22)
MONOCYTES NFR BLD AUTO: 2 % (ref 4–12)
NEUTROPHILS # BLD AUTO: 7.9 THOUSANDS/ÂΜL (ref 1.85–7.62)
NEUTS SEG NFR BLD AUTO: 27 % (ref 43–75)
NRBC BLD AUTO-RTO: 0 /100 WBCS
PLATELET # BLD AUTO: 139 THOUSANDS/UL (ref 149–390)
PMV BLD AUTO: 10.1 FL (ref 8.9–12.7)
POTASSIUM SERPL-SCNC: 4.4 MMOL/L (ref 3.5–5.3)
PROT SERPL-MCNC: 7.5 G/DL (ref 6.4–8.4)
RBC # BLD AUTO: 4.74 MILLION/UL (ref 3.88–5.62)
SODIUM SERPL-SCNC: 135 MMOL/L (ref 135–147)
WBC # BLD AUTO: 29.56 THOUSAND/UL (ref 4.31–10.16)

## 2025-03-20 PROCEDURE — 74176 CT ABD & PELVIS W/O CONTRAST: CPT

## 2025-03-20 PROCEDURE — 93005 ELECTROCARDIOGRAM TRACING: CPT

## 2025-03-20 PROCEDURE — 99347 HOME/RES VST EST SF MDM 20: CPT | Performed by: NURSE PRACTITIONER

## 2025-03-20 PROCEDURE — 96376 TX/PRO/DX INJ SAME DRUG ADON: CPT

## 2025-03-20 PROCEDURE — 85025 COMPLETE CBC W/AUTO DIFF WBC: CPT | Performed by: EMERGENCY MEDICINE

## 2025-03-20 PROCEDURE — 96374 THER/PROPH/DIAG INJ IV PUSH: CPT

## 2025-03-20 PROCEDURE — 71250 CT THORAX DX C-: CPT

## 2025-03-20 PROCEDURE — 99285 EMERGENCY DEPT VISIT HI MDM: CPT | Performed by: EMERGENCY MEDICINE

## 2025-03-20 PROCEDURE — 72125 CT NECK SPINE W/O DYE: CPT

## 2025-03-20 PROCEDURE — 70450 CT HEAD/BRAIN W/O DYE: CPT

## 2025-03-20 PROCEDURE — 84484 ASSAY OF TROPONIN QUANT: CPT | Performed by: EMERGENCY MEDICINE

## 2025-03-20 PROCEDURE — 36415 COLL VENOUS BLD VENIPUNCTURE: CPT | Performed by: EMERGENCY MEDICINE

## 2025-03-20 PROCEDURE — 99284 EMERGENCY DEPT VISIT MOD MDM: CPT

## 2025-03-20 PROCEDURE — 80053 COMPREHEN METABOLIC PANEL: CPT | Performed by: EMERGENCY MEDICINE

## 2025-03-20 RX ORDER — HYDROMORPHONE HCL/PF 1 MG/ML
1 SYRINGE (ML) INJECTION ONCE
Status: COMPLETED | OUTPATIENT
Start: 2025-03-20 | End: 2025-03-20

## 2025-03-20 RX ORDER — AZITHROMYCIN 250 MG/1
TABLET, FILM COATED ORAL
Qty: 6 TABLET | Refills: 0 | Status: SHIPPED | OUTPATIENT
Start: 2025-03-20 | End: 2025-03-25

## 2025-03-20 RX ADMIN — HYDROMORPHONE HYDROCHLORIDE 1 MG: 1 INJECTION, SOLUTION INTRAMUSCULAR; INTRAVENOUS; SUBCUTANEOUS at 20:34

## 2025-03-20 RX ADMIN — HYDROMORPHONE HYDROCHLORIDE 1 MG: 1 INJECTION, SOLUTION INTRAMUSCULAR; INTRAVENOUS; SUBCUTANEOUS at 22:06

## 2025-03-20 NOTE — PROGRESS NOTES
:  Assessment & Plan  Respiratory infection  Drink fluids to stay hydrated  Continue to monitor BG    Orders:    azithromycin (Zithromax) 250 mg tablet; Take 2 tablets (500 mg total) by mouth daily for 1 day, THEN 1 tablet (250 mg total) daily for 4 days.        History of Present Illness     Alonzo Watson is a 64 y.o. male   Patient is homebound patient seen and examined for sick call visit.  He reports fever, cough and fatigue as well as nausea.  >20 minutes spent with patient on detailed history, physical exam and assessment, planning and diagnosing and education.        Review of Systems   Constitutional:  Positive for fatigue and fever.   HENT: Negative.     Eyes: Negative.    Respiratory:  Positive for cough.    Cardiovascular: Negative.    Gastrointestinal:  Positive for nausea.   Endocrine: Negative.    Genitourinary: Negative.    Musculoskeletal: Negative.    Skin: Negative.    Allergic/Immunologic: Negative.    Neurological: Negative.    Hematological: Negative.    Psychiatric/Behavioral: Negative.       Objective   There were no vitals taken for this visit.     Physical Exam  Constitutional:       General: He is not in acute distress.     Appearance: He is obese. He is not toxic-appearing or diaphoretic.   HENT:      Head: Normocephalic and atraumatic.      Right Ear: External ear normal.      Left Ear: External ear normal.      Nose: Nose normal.      Mouth/Throat:      Mouth: Mucous membranes are moist.   Cardiovascular:      Rate and Rhythm: Normal rate and regular rhythm.      Pulses: Normal pulses.      Heart sounds: Normal heart sounds.   Pulmonary:      Effort: Pulmonary effort is normal.      Breath sounds: Normal breath sounds.   Abdominal:      General: Bowel sounds are normal.      Tenderness: There is no abdominal tenderness. There is no guarding.   Musculoskeletal:         General: Normal range of motion.      Cervical back: Normal range of motion. No rigidity.      Right lower leg: No edema.       Left lower leg: No edema.   Lymphadenopathy:      Cervical: No cervical adenopathy.   Skin:     General: Skin is warm and dry.   Neurological:      Mental Status: He is alert and oriented to person, place, and time.      Gait: Gait abnormal.   Psychiatric:         Mood and Affect: Mood normal.         Behavior: Behavior normal.         Thought Content: Thought content normal.         Judgment: Judgment normal.

## 2025-03-20 NOTE — ED PROVIDER NOTES
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 Max Daily Amount: 12 tablets, Starting Fri 3/7/2025, Normal      polyethylene glycol (MIRALAX) 17 g packet Take 17 g by mouth daily, Starting Tue 8/27/2024, No Print      !! QUEtiapine (SEROquel) 100 mg tablet Take 1 tablet (100 mg total) by mouth in the morning, Starting Thu 9/19/2024, Normal      !! QUEtiapine (SEROquel) 300 mg tablet Take 1 tablet (300 mg total) by mouth daily at bedtime, Starting Thu 9/19/2024, Normal      sertraline (ZOLOFT) 50 mg tablet Take 1 tablet (50 mg total) by mouth daily Daily at bedtime, Starting Mon 9/19/2022, Normal      sodium chloride 1 g tablet Take 1 tablet (1 g total) by mouth every morning, Starting Wed 1/22/2025, Normal      tamsulosin (FLOMAX) 0.4 mg Take 2 capsules (0.8 mg total) by mouth daily with dinner, Starting Tue 1/7/2025, Normal      tiZANidine (ZANAFLEX) 4 mg tablet Take 2 tablets (8 mg total) by mouth every 6 (six) hours as needed for muscle spasms, Starting Thu 3/13/2025, Normal      !! Unifine SafeControl Pen Needle 30G X 5 MM MISC Inject under the skin 5 (five) times a day 5 times a day use, Starting Tue 1/7/2025, Normal      Ventolin  (90 Base) MCG/ACT inhaler INHALE 2 PUFFS EVERY 6 (SIX) HOURS AS NEEDED FOR WHEEZING, Starting Fri 11/24/2023, Normal       !! - Potential duplicate medications found. Please discuss with provider.        No  discharge procedures on file.  ED SEPSIS DOCUMENTATION   Time reflects when diagnosis was documented in both MDM as applicable and the Disposition within this note       Time User Action Codes Description Comment    3/20/2025  9:38 PM Andreiapu, Corrie Montes [W19.XXXA] Fall, initial encounter     3/20/2025  9:38 PM Rajeshu, Corrie Montes [M54.2] Neck pain                  Corrie Anepu, DO  03/20/25 2357       Corrie Boswellpu, DO  03/21/25 0000

## 2025-03-21 LAB
ATRIAL RATE: 91 BPM
QRS AXIS: 81 DEGREES
QRSD INTERVAL: 94 MS
QT INTERVAL: 350 MS
QTC INTERVAL: 430 MS
T WAVE AXIS: 22 DEGREES
VENTRICULAR RATE: 91 BPM

## 2025-03-21 PROCEDURE — 93010 ELECTROCARDIOGRAM REPORT: CPT | Performed by: INTERNAL MEDICINE

## 2025-03-21 NOTE — DISCHARGE INSTRUCTIONS
Patient Education     Cervical Sprain ED   General Information   You came to the Emergency Department (ED) for a cervical sprain. This is the medical name for a whiplash injury that happens when your head is suddenly jerked forward and backward. A cervical strain most often happens from a car crash or sports injury.  Your neck has many parts including bones, muscles, tendons, ligaments, and nerves. Vertebrae, the bones in your spine, start at the base of your skull and extend down the back of your neck. There are discs between the vertebrae to cushion the bones. Ligaments, muscles, and tendons help hold your spine in place and let you move your neck. Your spinal cord, the major nerve of your body, starts at the base of your brain and extends down your back. It is protected by your vertebrae. Smaller nerves travel from your spinal cord to your muscles and skin.  Most neck pain is caused by an injury to a ligament, tendon, muscle, or nerve. The doctors who examined you today do not think your injury is serious and you may recover at home. If x-rays or other imaging tests were done, they did not show a cervical fracture.  What care is needed at home?   Call your regular doctor to let them know you were in the ED. Make a follow-up appointment if you were told to.  Wear your neck brace or cushion as you were told to. If the doctor told you to, you may start doing gentle neck stretches in a few days.  For recent sprains, place an ice pack or a bag of frozen vegetables wrapped in a towel over the painful part. Never put ice right on the skin. Use ice every 1 to 2 hours for 10 to 15 minutes at a time. Use for the first 24 to 48 hours after your injury.  Use heat after the first 24 to 48 hours, but not right away. Put a heating pad on the painful part for no more than 20 minutes at a time. Never go to sleep with a heating pad on as this can cause burns. You can also take a hot shower or bath.  You may want to take medicines  like ibuprofen or naproxen for swelling and pain. These are nonsteroidal anti-inflammatory drugs (NSAIDs).  Try to practice good posture to avoid putting strain on your neck. Sit up straight and keep your shoulders back. It can also help to avoid sitting in the same position for too long and to avoid putting pressure on your upper back by carrying heavy things. When you sleep, try to keep your neck in line with the rest of your body.  When do I need to get emergency help?   Call for an ambulance right away if:   You have trouble breathing.  You are too weak to stand or cannot move one or both of your arms or legs.  Return to the ED if:   You have new weakness in one or both of your arms or legs.  You have numbness, tingling, or shooting pain in one or both of your arms or legs.  You have bad pain that is not helped by pain medicine.  When do I need to call the doctor?   Your symptoms are not getting better after treating them at home for a few weeks.  Your symptoms are getting worse and you cannot do normal activities like dress yourself or eat.  You have new or worsening symptoms.  Last Reviewed Date   2021-05-06  Consumer Information Use and Disclaimer   This generalized information is a limited summary of diagnosis, treatment, and/or medication information. It is not meant to be comprehensive and should be used as a tool to help the user understand and/or assess potential diagnostic and treatment options. It does NOT include all information about conditions, treatments, medications, side effects, or risks that may apply to a specific patient. It is not intended to be medical advice or a substitute for the medical advice, diagnosis, or treatment of a health care provider based on the health care provider's examination and assessment of a patient’s specific and unique circumstances. Patients must speak with a health care provider for complete information about their health, medical questions, and treatment options,  including any risks or benefits regarding use of medications. This information does not endorse any treatments or medications as safe, effective, or approved for treating a specific patient. [employer] and its affiliates disclaim any warranty or liability relating to this information or the use thereof. The use of this information is governed by the Terms of Use, available at https://www.[employer].com/en/know/clinical-effectiveness-terms   Copyright   Copyright © 2024 [employer] and its affiliates and/or licensors. All rights reserved.

## 2025-03-24 ENCOUNTER — TELEPHONE (OUTPATIENT)
Age: 65
End: 2025-03-24

## 2025-03-24 NOTE — TELEPHONE ENCOUNTER
[facility] Physician's Order    Scanned copy into encounter    Placed in Tuesday's folder in precepting room.    Fax when complete: [phone]

## 2025-03-25 NOTE — TELEPHONE ENCOUNTER
Completed form has been faxed to the number listed below copy has been attached to this encounter.    [phone]    ID#-[id number]

## 2025-03-28 DIAGNOSIS — E11.42 DIABETIC POLYNEUROPATHY ASSOCIATED WITH TYPE 2 DIABETES MELLITUS (HCC): ICD-10-CM

## 2025-03-28 DIAGNOSIS — E11.8 TYPE 2 DIABETES MELLITUS WITH COMPLICATION, WITH LONG-TERM CURRENT USE OF INSULIN (HCC): ICD-10-CM

## 2025-03-28 DIAGNOSIS — Z79.4 TYPE 2 DIABETES MELLITUS WITH COMPLICATION, WITH LONG-TERM CURRENT USE OF INSULIN (HCC): ICD-10-CM

## 2025-03-28 DIAGNOSIS — G89.29 CHRONIC INTRACTABLE PAIN: ICD-10-CM

## 2025-03-28 DIAGNOSIS — E11.65 TYPE 2 DIABETES MELLITUS WITH HYPERGLYCEMIA, WITH LONG-TERM CURRENT USE OF INSULIN (HCC): ICD-10-CM

## 2025-03-28 DIAGNOSIS — Z79.4 TYPE 2 DIABETES MELLITUS WITH HYPERGLYCEMIA, WITH LONG-TERM CURRENT USE OF INSULIN (HCC): ICD-10-CM

## 2025-03-28 RX ORDER — PEN NEEDLE, DIABETIC 33 GX5/32"
NEEDLE, DISPOSABLE MISCELLANEOUS
Qty: 500 EACH | Refills: 5 | Status: SHIPPED | OUTPATIENT
Start: 2025-03-28

## 2025-03-28 RX ORDER — UBIQUINOL 100 MG
CAPSULE ORAL 4 TIMES DAILY
Qty: 100 EACH | Refills: 0 | Status: SHIPPED | OUTPATIENT
Start: 2025-03-28

## 2025-03-28 RX ORDER — METFORMIN HYDROCHLORIDE 500 MG/1
500 TABLET, EXTENDED RELEASE ORAL 2 TIMES DAILY WITH MEALS
Qty: 60 TABLET | Refills: 0 | Status: SHIPPED | OUTPATIENT
Start: 2025-03-28

## 2025-03-28 RX ORDER — BLOOD SUGAR DIAGNOSTIC
STRIP MISCELLANEOUS
Qty: 400 EACH | Refills: 0 | Status: SHIPPED | OUTPATIENT
Start: 2025-03-28

## 2025-03-28 RX ORDER — INSULIN LISPRO 200 [IU]/ML
15 INJECTION, SOLUTION SUBCUTANEOUS
Qty: 6 ML | Refills: 0 | Status: SHIPPED | OUTPATIENT
Start: 2025-03-28

## 2025-04-09 DIAGNOSIS — E11.8 TYPE 2 DIABETES MELLITUS WITH COMPLICATION, WITH LONG-TERM CURRENT USE OF INSULIN (HCC): ICD-10-CM

## 2025-04-09 DIAGNOSIS — Z79.4 TYPE 2 DIABETES MELLITUS WITH COMPLICATION, WITH LONG-TERM CURRENT USE OF INSULIN (HCC): ICD-10-CM

## 2025-04-09 DIAGNOSIS — G89.29 CHRONIC INTRACTABLE PAIN: ICD-10-CM

## 2025-04-09 DIAGNOSIS — E11.42 DIABETIC POLYNEUROPATHY ASSOCIATED WITH TYPE 2 DIABETES MELLITUS (HCC): ICD-10-CM

## 2025-04-09 RX ORDER — BLOOD SUGAR DIAGNOSTIC
STRIP MISCELLANEOUS
Qty: 400 EACH | Refills: 0 | Status: SHIPPED | OUTPATIENT
Start: 2025-04-09 | End: 2025-04-11 | Stop reason: SDUPTHER

## 2025-04-09 RX ORDER — OXYCODONE AND ACETAMINOPHEN 5; 325 MG/1; MG/1
2 TABLET ORAL EVERY 4 HOURS PRN
Qty: 360 TABLET | Refills: 0 | Status: SHIPPED | OUTPATIENT
Start: 2025-04-09

## 2025-04-10 ENCOUNTER — IN HOME VISIT (OUTPATIENT)
Age: 65
End: 2025-04-10

## 2025-04-10 VITALS
OXYGEN SATURATION: 99 % | SYSTOLIC BLOOD PRESSURE: 138 MMHG | RESPIRATION RATE: 20 BRPM | HEART RATE: 88 BPM | DIASTOLIC BLOOD PRESSURE: 80 MMHG | TEMPERATURE: 98.2 F

## 2025-04-10 DIAGNOSIS — E11.8 TYPE 2 DIABETES MELLITUS WITH COMPLICATION, WITH LONG-TERM CURRENT USE OF INSULIN (HCC): ICD-10-CM

## 2025-04-10 DIAGNOSIS — G89.4 CHRONIC PAIN SYNDROME: Primary | ICD-10-CM

## 2025-04-10 DIAGNOSIS — Z79.4 TYPE 2 DIABETES MELLITUS WITH COMPLICATION, WITH LONG-TERM CURRENT USE OF INSULIN (HCC): ICD-10-CM

## 2025-04-10 DIAGNOSIS — G89.29 CHRONIC INTRACTABLE PAIN: ICD-10-CM

## 2025-04-10 PROCEDURE — 99348 HOME/RES VST EST LOW MDM 30: CPT | Performed by: NURSE PRACTITIONER

## 2025-04-10 NOTE — PROGRESS NOTES
":  Assessment & Plan  Chronic pain syndrome  Patient previously with c/o intractable back pain on percocet and gabapentin as well as tizanidine. Currently c/o generalized pain described as \"everywhere\".  Advised patient that his percocet cannot be increased and to continue alternative therapies.  Patient to continue percocet as he does not abuse or misuse medication and it improves his quality of life allowing him to do ADL's at home.  Educated to use his Tens unit and start wearing his back brace and continue pain medication as prescribed as well as heating pad when needed.  Patient was in agreement with plan.           Type 2 diabetes mellitus with complication, with long-term current use of insulin ([facility])  Patient concerned about blood sugar \"crashing\" to low of 90.  He continues to be non compliant with diet and insulin orders.  Again educated on normal insulin levels, insulin orders, and diabetic diet.    Lab Results   Component Value Date    HGBA1C 10.7 (H) 12/09/2024            Chronic intractable pain    Orders:    tiZANidine (ZANAFLEX) 4 mg tablet; Take 2 tablets (8 mg total) by mouth every 6 (six) hours as needed for muscle spasms        History of Present Illness     Alonzo Watson is a 64 y.o. male   63 yo homebound patient requested visit for pain management.  Patient c/o generalized pain.  >30 minutes spent with patient on history, physical exam and assessment, planning and diagnosing and education.      Review of Systems   Constitutional: Negative.    HENT: Negative.     Eyes: Negative.    Respiratory: Negative.     Cardiovascular: Negative.    Gastrointestinal: Negative.    Endocrine: Negative.    Genitourinary: Negative.    Musculoskeletal:  Positive for arthralgias, back pain and myalgias.   Skin: Negative.    Allergic/Immunologic: Negative.    Neurological: Negative.    Hematological: Negative.    Psychiatric/Behavioral: Negative.       Objective   /80   Pulse 88   Temp 98.2 °F (36.8 °C)   " Resp 20   SpO2 99%      Physical Exam  Constitutional:       General: He is not in acute distress.     Appearance: He is obese. He is not ill-appearing, toxic-appearing or diaphoretic.   HENT:      Head: Normocephalic and atraumatic.      Right Ear: External ear normal.      Left Ear: External ear normal.      Nose: Nose normal. No congestion.      Mouth/Throat:      Mouth: Mucous membranes are moist.      Pharynx: Oropharynx is clear.   Eyes:      General:         Right eye: No discharge.         Left eye: No discharge.      Conjunctiva/sclera: Conjunctivae normal.   Cardiovascular:      Rate and Rhythm: Normal rate and regular rhythm.      Pulses: Normal pulses.      Heart sounds: Normal heart sounds.   Pulmonary:      Effort: Pulmonary effort is normal. No respiratory distress.      Breath sounds: Normal breath sounds. No wheezing, rhonchi or rales.   Abdominal:      General: Bowel sounds are normal.      Tenderness: There is no abdominal tenderness. There is no guarding.   Musculoskeletal:         General: Normal range of motion.      Cervical back: Normal range of motion. No rigidity.      Right lower leg: No edema.      Left lower leg: Edema present.   Lymphadenopathy:      Cervical: No cervical adenopathy.   Skin:     General: Skin is warm and dry.   Neurological:      General: No focal deficit present.      Mental Status: He is alert and oriented to person, place, and time.      Gait: Gait abnormal.   Psychiatric:         Mood and Affect: Mood normal.         Behavior: Behavior normal.         Thought Content: Thought content normal.         Judgment: Judgment normal.

## 2025-04-10 NOTE — ASSESSMENT & PLAN NOTE
Orders:    tiZANidine (ZANAFLEX) 4 mg tablet; Take 2 tablets (8 mg total) by mouth every 6 (six) hours as needed for muscle spasms

## 2025-04-10 NOTE — ASSESSMENT & PLAN NOTE
"Patient concerned about blood sugar \"crashing\" to low of 90.  He continues to be non compliant with diet and insulin orders.  Again educated on normal insulin levels, insulin orders, and diabetic diet.    Lab Results   Component Value Date    HGBA1C 10.7 (H) 12/09/2024            "

## 2025-04-10 NOTE — ASSESSMENT & PLAN NOTE
"Patient previously with c/o intractable back pain on percocet and gabapentin as well as tizanidine. Currently c/o generalized pain described as \"everywhere\".  Advised patient that his percocet cannot be increased and to continue alternative therapies.  Patient to continue percocet as he does not abuse or misuse medication and it improves his quality of life allowing him to do ADL's at home.  Educated to use his Tens unit and start wearing his back brace and continue pain medication as prescribed as well as heating pad when needed.  Patient was in agreement with plan.           "

## 2025-04-11 DIAGNOSIS — G89.29 CHRONIC INTRACTABLE PAIN: ICD-10-CM

## 2025-04-11 DIAGNOSIS — E11.8 TYPE 2 DIABETES MELLITUS WITH COMPLICATION, WITH LONG-TERM CURRENT USE OF INSULIN (HCC): ICD-10-CM

## 2025-04-11 DIAGNOSIS — Z79.4 TYPE 2 DIABETES MELLITUS WITH COMPLICATION, WITH LONG-TERM CURRENT USE OF INSULIN (HCC): ICD-10-CM

## 2025-04-11 DIAGNOSIS — E11.42 DIABETIC POLYNEUROPATHY ASSOCIATED WITH TYPE 2 DIABETES MELLITUS (HCC): ICD-10-CM

## 2025-04-11 RX ORDER — BLOOD SUGAR DIAGNOSTIC
STRIP MISCELLANEOUS
Qty: 400 EACH | Refills: 0 | Status: SHIPPED | OUTPATIENT
Start: 2025-04-11

## 2025-04-12 PROBLEM — Z00.01 ENCOUNTER FOR GENERAL ADULT MEDICAL EXAMINATION WITH ABNORMAL FINDINGS: Status: RESOLVED | Noted: 2025-03-13 | Resolved: 2025-04-12

## 2025-04-16 NOTE — TELEPHONE ENCOUNTER
I called and spoke to pt whom informed he was laying down and to call him back on Monday  Will do per his request   Pt needs to be scheduled for EGD at [place] [id number] for GERD/Gastroparesis  warm and dry/color normal

## 2025-05-01 DIAGNOSIS — G62.9 PERIPHERAL POLYNEUROPATHY: ICD-10-CM

## 2025-05-01 RX ORDER — GABAPENTIN 800 MG/1
800 TABLET ORAL 4 TIMES DAILY
Qty: 120 TABLET | Refills: 4 | Status: SHIPPED | OUTPATIENT
Start: 2025-05-01

## 2025-05-04 DIAGNOSIS — G89.29 CHRONIC INTRACTABLE PAIN: ICD-10-CM

## 2025-05-04 DIAGNOSIS — E11.8 TYPE 2 DIABETES MELLITUS WITH COMPLICATION, WITH LONG-TERM CURRENT USE OF INSULIN (HCC): ICD-10-CM

## 2025-05-04 DIAGNOSIS — Z79.4 TYPE 2 DIABETES MELLITUS WITH COMPLICATION, WITH LONG-TERM CURRENT USE OF INSULIN (HCC): ICD-10-CM

## 2025-05-05 RX ORDER — OXYCODONE AND ACETAMINOPHEN 5; 325 MG/1; MG/1
2 TABLET ORAL EVERY 4 HOURS PRN
Qty: 360 TABLET | Refills: 0 | Status: SHIPPED | OUTPATIENT
Start: 2025-05-05

## 2025-05-05 RX ORDER — INSULIN LISPRO 200 [IU]/ML
15 INJECTION, SOLUTION SUBCUTANEOUS
Qty: 6 ML | Refills: 3 | Status: SHIPPED | OUTPATIENT
Start: 2025-05-05

## 2025-05-21 DIAGNOSIS — E11.42 DIABETIC POLYNEUROPATHY ASSOCIATED WITH TYPE 2 DIABETES MELLITUS (HCC): ICD-10-CM

## 2025-05-21 DIAGNOSIS — E11.8 TYPE 2 DIABETES MELLITUS WITH COMPLICATION, WITH LONG-TERM CURRENT USE OF INSULIN (HCC): ICD-10-CM

## 2025-05-21 DIAGNOSIS — Z79.4 TYPE 2 DIABETES MELLITUS WITH COMPLICATION, WITH LONG-TERM CURRENT USE OF INSULIN (HCC): ICD-10-CM

## 2025-05-21 DIAGNOSIS — Z79.4 TYPE 2 DIABETES MELLITUS WITH HYPERGLYCEMIA, WITH LONG-TERM CURRENT USE OF INSULIN (HCC): ICD-10-CM

## 2025-05-21 DIAGNOSIS — E11.65 TYPE 2 DIABETES MELLITUS WITH HYPERGLYCEMIA, WITH LONG-TERM CURRENT USE OF INSULIN (HCC): ICD-10-CM

## 2025-05-21 DIAGNOSIS — I48.91 ATRIAL FIBRILLATION WITH RVR (HCC): ICD-10-CM

## 2025-05-21 RX ORDER — UBIQUINOL 100 MG
CAPSULE ORAL 4 TIMES DAILY
Qty: 100 EACH | Refills: 0 | Status: SHIPPED | OUTPATIENT
Start: 2025-05-21

## 2025-05-21 RX ORDER — BLOOD SUGAR DIAGNOSTIC
STRIP MISCELLANEOUS
Qty: 400 EACH | Refills: 0 | Status: SHIPPED | OUTPATIENT
Start: 2025-05-21

## 2025-05-21 RX ORDER — INSULIN LISPRO 200 [IU]/ML
15 INJECTION, SOLUTION SUBCUTANEOUS
Qty: 6 ML | Refills: 0 | Status: SHIPPED | OUTPATIENT
Start: 2025-05-21

## 2025-05-22 RX ORDER — DIGOXIN 250 MCG
250 TABLET ORAL DAILY
Qty: 90 TABLET | Refills: 0 | Status: SHIPPED | OUTPATIENT
Start: 2025-05-22

## 2025-05-27 DIAGNOSIS — E11.8 TYPE 2 DIABETES MELLITUS WITH COMPLICATION, WITH LONG-TERM CURRENT USE OF INSULIN (HCC): ICD-10-CM

## 2025-05-27 DIAGNOSIS — I50.9 CHRONIC CONGESTIVE HEART FAILURE, UNSPECIFIED HEART FAILURE TYPE (HCC): ICD-10-CM

## 2025-05-27 DIAGNOSIS — Z79.4 TYPE 2 DIABETES MELLITUS WITH COMPLICATION, WITH LONG-TERM CURRENT USE OF INSULIN (HCC): ICD-10-CM

## 2025-05-27 DIAGNOSIS — J43.2 CENTRILOBULAR EMPHYSEMA (HCC): ICD-10-CM

## 2025-05-27 DIAGNOSIS — G89.29 CHRONIC INTRACTABLE PAIN: ICD-10-CM

## 2025-05-27 RX ORDER — FLUTICASONE FUROATE, UMECLIDINIUM BROMIDE AND VILANTEROL TRIFENATATE 100; 62.5; 25 UG/1; UG/1; UG/1
1 POWDER RESPIRATORY (INHALATION) DAILY
Qty: 28 EACH | Refills: 0 | Status: SHIPPED | OUTPATIENT
Start: 2025-05-27

## 2025-05-27 RX ORDER — BUMETANIDE 1 MG/1
1 TABLET ORAL 2 TIMES DAILY
Qty: 60 TABLET | Refills: 0 | Status: SHIPPED | OUTPATIENT
Start: 2025-05-27

## 2025-05-27 RX ORDER — OXYCODONE AND ACETAMINOPHEN 5; 325 MG/1; MG/1
2 TABLET ORAL EVERY 4 HOURS PRN
Qty: 360 TABLET | Refills: 0 | OUTPATIENT
Start: 2025-05-27

## 2025-05-27 RX ORDER — INSULIN LISPRO 200 [IU]/ML
15 INJECTION, SOLUTION SUBCUTANEOUS
Qty: 6 ML | Refills: 0 | Status: SHIPPED | OUTPATIENT
Start: 2025-05-27

## 2025-05-29 ENCOUNTER — IN HOME VISIT (OUTPATIENT)
Age: 65
End: 2025-05-29

## 2025-05-29 VITALS
WEIGHT: 206 LBS | HEART RATE: 96 BPM | DIASTOLIC BLOOD PRESSURE: 80 MMHG | BODY MASS INDEX: 28.73 KG/M2 | TEMPERATURE: 98.1 F | SYSTOLIC BLOOD PRESSURE: 110 MMHG | OXYGEN SATURATION: 99 %

## 2025-05-29 DIAGNOSIS — E11.8 TYPE 2 DIABETES MELLITUS WITH COMPLICATION, WITH LONG-TERM CURRENT USE OF INSULIN (HCC): ICD-10-CM

## 2025-05-29 DIAGNOSIS — I50.9 CHRONIC CONGESTIVE HEART FAILURE, UNSPECIFIED HEART FAILURE TYPE (HCC): ICD-10-CM

## 2025-05-29 DIAGNOSIS — G89.29 CHRONIC INTRACTABLE PAIN: Primary | ICD-10-CM

## 2025-05-29 DIAGNOSIS — Z79.4 TYPE 2 DIABETES MELLITUS WITH COMPLICATION, WITH LONG-TERM CURRENT USE OF INSULIN (HCC): ICD-10-CM

## 2025-05-29 DIAGNOSIS — I48.20 CHRONIC A-FIB (HCC): ICD-10-CM

## 2025-05-29 DIAGNOSIS — I10 BENIGN ESSENTIAL HYPERTENSION: ICD-10-CM

## 2025-05-29 PROCEDURE — 99349 HOME/RES VST EST MOD MDM 40: CPT | Performed by: NURSE PRACTITIONER

## 2025-05-29 RX ORDER — OXYCODONE AND ACETAMINOPHEN 5; 325 MG/1; MG/1
2 TABLET ORAL EVERY 4 HOURS PRN
Qty: 360 TABLET | Refills: 0 | Status: SHIPPED | OUTPATIENT
Start: 2025-05-29 | End: 2025-06-03

## 2025-05-29 NOTE — PROGRESS NOTES
:  Assessment & Plan  Chronic intractable pain  Chronic  Continue tizanidine as ordered  Continue using tens unit and back brace to relieve pain   Patient takes percocet-he does not abuse or misuse percocet    Orders:    oxyCODONE-acetaminophen (PERCOCET) 5-325 mg per tablet; Take 2 tablets by mouth every 4 (four) hours as needed for moderate pain or severe pain Max Daily Amount: 12 tablets    Type 2 diabetes mellitus with complication, with long-term current use of insulin ([facility])    Lab Results   Component Value Date    HGBA1C 10.7 (H) 12/09/2024   Educated patient on proper use of insulin  Patient reports BG [id number]  Educated again to take humalog sliding scale as directed and to take it 30 minutes before meals  Educated to take lantus at bed time  Educated to not take blood glucose tablets when BG is .  Educated to only take blood glucose if her feels symptoms  Educated to avoid sweets and sugary beverages  Continue to take metformin as directed         Chronic congestive heart failure, unspecified heart failure type ([facility])  Wt Readings from Last 3 Encounters:   05/29/25 93.4 kg (206 lb)   09/24/24 109 kg (240 lb)   08/25/24 107 kg (235 lb 3.7 oz)   Chronic,stable  Lungs clear today  No LE edema today  Continue bumex                 Essential hypertension  Chronic, stable  BP today 110/80  Continue metoprolol         Chronic a-fib ([facility])  Chronic, stable  Continue digoxin, metoprolol,eliquis and losartan           History of Present Illness     Alonzo Watson is a 64 y.o. male   Patient is a 63 yo male with multiple health care concerns visited today at home.  He is homebound due to mobility issues and pain.  He has multiple concerns today including his pain and blood sugar control.  He has been non compliantt in past with taking his insulin as directed and following a diabetic diet.  >40 minutes spent with patient on detailed history, physical exam and assessment, planning and diagnosing and  education      Review of Systems   Constitutional:  Positive for appetite change.        Reports weight loss which is ideal for him.   HENT: Negative.     Eyes: Negative.    Respiratory:  Positive for cough.    Cardiovascular: Negative.    Gastrointestinal: Negative.    Endocrine: Negative.    Genitourinary: Negative.    Musculoskeletal:  Positive for arthralgias, back pain, gait problem, myalgias and neck pain.   Skin: Negative.    Allergic/Immunologic: Negative.    Neurological:  Negative for dizziness, syncope and light-headedness.   Hematological: Negative.    Psychiatric/Behavioral: Negative.       Objective   /80   Pulse 96   Temp 98.1 °F (36.7 °C)   Wt 93.4 kg (206 lb)   SpO2 99%   BMI 28.73 kg/m²      Physical Exam  Constitutional:       General: He is not in acute distress.     Appearance: He is obese. He is not ill-appearing, toxic-appearing or diaphoretic.   HENT:      Head: Normocephalic and atraumatic.      Right Ear: External ear normal.      Left Ear: External ear normal.      Nose: No congestion.      Mouth/Throat:      Mouth: Mucous membranes are moist.     Eyes:      General:         Right eye: No discharge.         Left eye: No discharge.      Conjunctiva/sclera: Conjunctivae normal.       Cardiovascular:      Rate and Rhythm: Normal rate and regular rhythm.      Pulses: Normal pulses.      Heart sounds: Normal heart sounds.   Pulmonary:      Effort: Pulmonary effort is normal. No respiratory distress.      Breath sounds: Normal breath sounds. No wheezing, rhonchi or rales.   Abdominal:      Palpations: Abdomen is soft.      Tenderness: There is no abdominal tenderness. There is no guarding.     Musculoskeletal:      Cervical back: No rigidity.      Right lower leg: No edema.      Left lower leg: No edema.   Lymphadenopathy:      Cervical: No cervical adenopathy.     Skin:     General: Skin is warm and dry.      Findings: No bruising or lesion.     Neurological:      Mental Status: He  is alert and oriented to person, place, and time.      Gait: Gait abnormal.     Psychiatric:         Mood and Affect: Mood normal.         Behavior: Behavior normal.         Thought Content: Thought content normal.         Judgment: Judgment normal.

## 2025-05-29 NOTE — ASSESSMENT & PLAN NOTE
Chronic  Continue tizanidine as ordered  Continue using tens unit and back brace to relieve pain   Patient takes percocet-he does not abuse or misuse percocet    Orders:    oxyCODONE-acetaminophen (PERCOCET) 5-325 mg per tablet; Take 2 tablets by mouth every 4 (four) hours as needed for moderate pain or severe pain Max Daily Amount: 12 tablets

## 2025-05-29 NOTE — ASSESSMENT & PLAN NOTE
Wt Readings from Last 3 Encounters:   05/29/25 93.4 kg (206 lb)   09/24/24 109 kg (240 lb)   08/25/24 107 kg (235 lb 3.7 oz)   Chronic,stable  Lungs clear today  No LE edema today  Continue bumex

## 2025-05-29 NOTE — PROGRESS NOTES
Diabetic Foot Exam    Patient's shoes and socks removed.    Right Foot/Ankle   Right Foot Inspection  Skin Exam: skin normal and skin intact. No dry skin, no warmth, no callus, no erythema, no maceration, no abnormal color, no pre-ulcer, no ulcer and no callus.     Toe Exam: ROM and strength within normal limits.     Sensory   Monofilament testing: intact    Vascular  The right DP pulse is 2+. The right PT pulse is 1+.     Left Foot/Ankle  Left Foot Inspection  Skin Exam: skin normal and skin intact. No dry skin, no warmth, no erythema, no maceration, normal color, no pre-ulcer, no ulcer and no callus.     Toe Exam: ROM and strength within normal limits.     Sensory   Monofilament testing: intact    Vascular  The left DP pulse is 2+. The left PT pulse is 1+.     Assign Risk Category  No deformity present  No loss of protective sensation  No weak pulses  Risk: 0

## 2025-05-29 NOTE — ASSESSMENT & PLAN NOTE
Lab Results   Component Value Date    HGBA1C 10.7 (H) 12/09/2024   Educated patient on proper use of insulin  Patient reports BG [id number]  Educated again to take humalog sliding scale as directed and to take it 30 minutes before meals  Educated to take lantus at bed time  Educated to not take blood glucose tablets when BG is .  Educated to only take blood glucose if her feels symptoms  Educated to avoid sweets and sugary beverages  Continue to take metformin as directed

## 2025-06-03 DIAGNOSIS — G89.29 CHRONIC INTRACTABLE PAIN: ICD-10-CM

## 2025-06-03 RX ORDER — OXYCODONE AND ACETAMINOPHEN 5; 325 MG/1; MG/1
2 TABLET ORAL EVERY 4 HOURS PRN
Qty: 360 TABLET | Refills: 0 | Status: SHIPPED | OUTPATIENT
Start: 2025-06-03

## 2025-06-04 ENCOUNTER — TELEPHONE (OUTPATIENT)
Age: 65
End: 2025-06-04

## 2025-06-04 DIAGNOSIS — M54.16 LUMBAR RADICULOPATHY: ICD-10-CM

## 2025-06-04 DIAGNOSIS — M54.41 CHRONIC BILATERAL LOW BACK PAIN WITH SCIATICA, SCIATICA LATERALITY UNSPECIFIED: Primary | ICD-10-CM

## 2025-06-04 DIAGNOSIS — R52 DIFFUSE PAIN: ICD-10-CM

## 2025-06-04 DIAGNOSIS — M54.42 CHRONIC BILATERAL LOW BACK PAIN WITH SCIATICA, SCIATICA LATERALITY UNSPECIFIED: Primary | ICD-10-CM

## 2025-06-04 DIAGNOSIS — G89.29 CHRONIC BILATERAL LOW BACK PAIN WITH SCIATICA, SCIATICA LATERALITY UNSPECIFIED: Primary | ICD-10-CM

## 2025-06-04 NOTE — TELEPHONE ENCOUNTER
Called and explained to patient his MRI results from March and that he doesn't require another MRI.  He is refusing x-ray.  Suggested PT/OT in home which he refused.  Continue tens unit and back brace and current pain medication.

## 2025-06-17 DIAGNOSIS — E87.1 HYPONATREMIA: ICD-10-CM

## 2025-06-17 DIAGNOSIS — I10 BENIGN ESSENTIAL HYPERTENSION: Primary | ICD-10-CM

## 2025-06-17 RX ORDER — LOSARTAN POTASSIUM 50 MG/1
50 TABLET ORAL DAILY
Qty: 90 TABLET | Refills: 3 | Status: SHIPPED | OUTPATIENT
Start: 2025-06-17

## 2025-06-17 RX ORDER — SODIUM CHLORIDE 1 G/1
1 TABLET ORAL EVERY MORNING
Qty: 30 TABLET | Refills: 3 | Status: SHIPPED | OUTPATIENT
Start: 2025-06-17

## 2025-06-19 ENCOUNTER — TELEPHONE (OUTPATIENT)
Age: 65
End: 2025-06-19

## 2025-06-19 NOTE — TELEPHONE ENCOUNTER
Completed form has been faxed to the number listed below copy has been attached to this encounter.

## 2025-06-19 NOTE — TELEPHONE ENCOUNTER
[employer]  Scanned into encounter  Placed in pcps folder  Fax or call:  Serjio ([facility]) [phone]

## 2025-06-23 DIAGNOSIS — E11.42 DIABETIC POLYNEUROPATHY ASSOCIATED WITH TYPE 2 DIABETES MELLITUS (HCC): ICD-10-CM

## 2025-06-23 RX ORDER — METFORMIN HYDROCHLORIDE 500 MG/1
500 TABLET, EXTENDED RELEASE ORAL 2 TIMES DAILY WITH MEALS
Qty: 60 TABLET | Refills: 6 | Status: SHIPPED | OUTPATIENT
Start: 2025-06-23

## 2025-07-01 DIAGNOSIS — Z79.4 TYPE 2 DIABETES MELLITUS WITH HYPERGLYCEMIA, WITH LONG-TERM CURRENT USE OF INSULIN (HCC): ICD-10-CM

## 2025-07-01 DIAGNOSIS — Z79.4 TYPE 2 DIABETES MELLITUS WITH COMPLICATION, WITH LONG-TERM CURRENT USE OF INSULIN (HCC): ICD-10-CM

## 2025-07-01 DIAGNOSIS — G89.29 CHRONIC INTRACTABLE PAIN: ICD-10-CM

## 2025-07-01 DIAGNOSIS — E11.8 TYPE 2 DIABETES MELLITUS WITH COMPLICATION, WITH LONG-TERM CURRENT USE OF INSULIN (HCC): ICD-10-CM

## 2025-07-01 DIAGNOSIS — E11.65 TYPE 2 DIABETES MELLITUS WITH HYPERGLYCEMIA, WITH LONG-TERM CURRENT USE OF INSULIN (HCC): ICD-10-CM

## 2025-07-01 RX ORDER — UBIQUINOL 100 MG
CAPSULE ORAL 4 TIMES DAILY
Qty: 100 EACH | Refills: 6 | Status: SHIPPED | OUTPATIENT
Start: 2025-07-01

## 2025-07-01 RX ORDER — OXYCODONE AND ACETAMINOPHEN 5; 325 MG/1; MG/1
2 TABLET ORAL EVERY 4 HOURS PRN
Qty: 360 TABLET | Refills: 0 | Status: SHIPPED | OUTPATIENT
Start: 2025-07-01 | End: 2025-07-02 | Stop reason: SDUPTHER

## 2025-07-01 RX ORDER — INSULIN LISPRO 200 [IU]/ML
15 INJECTION, SOLUTION SUBCUTANEOUS
Qty: 6 ML | Refills: 0 | Status: SHIPPED | OUTPATIENT
Start: 2025-07-01

## 2025-07-02 ENCOUNTER — TELEPHONE (OUTPATIENT)
Age: 65
End: 2025-07-02

## 2025-07-02 ENCOUNTER — APPOINTMENT (EMERGENCY)
Dept: RADIOLOGY | Facility: HOSPITAL | Age: 65
End: 2025-07-02
Payer: COMMERCIAL

## 2025-07-02 ENCOUNTER — HOSPITAL ENCOUNTER (EMERGENCY)
Facility: HOSPITAL | Age: 65
Discharge: HOME | End: 2025-07-03
Attending: EMERGENCY MEDICINE
Payer: COMMERCIAL

## 2025-07-02 DIAGNOSIS — G89.29 CHRONIC PAIN: ICD-10-CM

## 2025-07-02 DIAGNOSIS — G89.29 CHRONIC INTRACTABLE PAIN: ICD-10-CM

## 2025-07-02 DIAGNOSIS — K59.00 CONSTIPATION: Primary | ICD-10-CM

## 2025-07-02 DIAGNOSIS — D80.3 IGG DEFICIENCY (HCC): Primary | ICD-10-CM

## 2025-07-02 DIAGNOSIS — C91.10 CLL (CHRONIC LYMPHOCYTIC LEUKEMIA) (HCC): ICD-10-CM

## 2025-07-02 LAB
ANION GAP SERPL CALCULATED.3IONS-SCNC: 12 MMOL/L (ref 4–13)
ANISOCYTOSIS BLD QL SMEAR: PRESENT
BASOPHILS # BLD MANUAL: 0 THOUSAND/UL (ref 0–0.1)
BASOPHILS NFR MAR MANUAL: 0 % (ref 0–1)
BUN SERPL-MCNC: 14 MG/DL (ref 5–25)
CALCIUM SERPL-MCNC: 10.4 MG/DL (ref 8.4–10.2)
CHLORIDE SERPL-SCNC: 92 MMOL/L (ref 96–108)
CO2 SERPL-SCNC: 27 MMOL/L (ref 21–32)
CREAT SERPL-MCNC: 0.89 MG/DL (ref 0.6–1.3)
EOSINOPHIL # BLD MANUAL: 0 THOUSAND/UL (ref 0–0.4)
EOSINOPHIL NFR BLD MANUAL: 0 % (ref 0–6)
ERYTHROCYTE [DISTWIDTH] IN BLOOD BY AUTOMATED COUNT: 13.2 % (ref 11.6–15.1)
GFR SERPL CREATININE-BSD FRML MDRD: 89 ML/MIN/1.73SQ M
GLUCOSE SERPL-MCNC: 363 MG/DL (ref 65–140)
HCT VFR BLD AUTO: 45.1 % (ref 36.5–49.3)
HGB BLD-MCNC: 16 G/DL (ref 12–17)
LYMPHOCYTES # BLD AUTO: 22.38 THOUSAND/UL (ref 0.6–4.47)
LYMPHOCYTES # BLD AUTO: 70 % (ref 14–44)
MACROCYTES BLD QL AUTO: PRESENT
MCH RBC QN AUTO: 31.6 PG (ref 26.8–34.3)
MCHC RBC AUTO-ENTMCNC: 35.5 G/DL (ref 31.4–37.4)
MCV RBC AUTO: 89 FL (ref 82–98)
MONOCYTES # BLD AUTO: 0.93 THOUSAND/UL (ref 0–1.22)
MONOCYTES NFR BLD: 3 % (ref 4–12)
NEUTROPHILS # BLD MANUAL: 7.77 THOUSAND/UL (ref 1.85–7.62)
NEUTS SEG NFR BLD AUTO: 25 % (ref 43–75)
OVALOCYTES BLD QL SMEAR: PRESENT
PLATELET # BLD AUTO: 187 THOUSANDS/UL (ref 149–390)
PLATELET BLD QL SMEAR: ADEQUATE
PMV BLD AUTO: 9.6 FL (ref 8.9–12.7)
POIKILOCYTOSIS BLD QL SMEAR: PRESENT
POLYCHROMASIA BLD QL SMEAR: PRESENT
POTASSIUM SERPL-SCNC: 4 MMOL/L (ref 3.5–5.3)
RBC # BLD AUTO: 5.06 MILLION/UL (ref 3.88–5.62)
RBC MORPH BLD: PRESENT
SMUDGE CELLS BLD QL SMEAR: PRESENT
SODIUM SERPL-SCNC: 131 MMOL/L (ref 135–147)
VARIANT LYMPHS # BLD AUTO: 2 %
WBC # BLD AUTO: 31.08 THOUSAND/UL (ref 4.31–10.16)

## 2025-07-02 PROCEDURE — 71045 X-RAY EXAM CHEST 1 VIEW: CPT

## 2025-07-02 PROCEDURE — 74177 CT ABD & PELVIS W/CONTRAST: CPT

## 2025-07-02 PROCEDURE — 99284 EMERGENCY DEPT VISIT MOD MDM: CPT

## 2025-07-02 PROCEDURE — 99285 EMERGENCY DEPT VISIT HI MDM: CPT | Performed by: EMERGENCY MEDICINE

## 2025-07-02 PROCEDURE — 85007 BL SMEAR W/DIFF WBC COUNT: CPT | Performed by: EMERGENCY MEDICINE

## 2025-07-02 PROCEDURE — 80048 BASIC METABOLIC PNL TOTAL CA: CPT | Performed by: EMERGENCY MEDICINE

## 2025-07-02 PROCEDURE — 36415 COLL VENOUS BLD VENIPUNCTURE: CPT | Performed by: EMERGENCY MEDICINE

## 2025-07-02 PROCEDURE — 85027 COMPLETE CBC AUTOMATED: CPT | Performed by: EMERGENCY MEDICINE

## 2025-07-02 PROCEDURE — 93005 ELECTROCARDIOGRAM TRACING: CPT

## 2025-07-02 PROCEDURE — 96374 THER/PROPH/DIAG INJ IV PUSH: CPT

## 2025-07-02 RX ORDER — MORPHINE SULFATE 4 MG/ML
4 INJECTION, SOLUTION INTRAMUSCULAR; INTRAVENOUS ONCE
Status: COMPLETED | OUTPATIENT
Start: 2025-07-02 | End: 2025-07-02

## 2025-07-02 RX ORDER — OXYCODONE AND ACETAMINOPHEN 5; 325 MG/1; MG/1
2 TABLET ORAL EVERY 4 HOURS PRN
Qty: 360 TABLET | Refills: 0 | Status: SHIPPED | OUTPATIENT
Start: 2025-07-02

## 2025-07-02 RX ADMIN — IOHEXOL 100 ML: 350 INJECTION, SOLUTION INTRAVENOUS at 23:52

## 2025-07-02 RX ADMIN — MORPHINE SULFATE 4 MG: 4 INJECTION, SOLUTION INTRAMUSCULAR; INTRAVENOUS at 22:49

## 2025-07-02 NOTE — TELEPHONE ENCOUNTER
Patient calling to schedule a virtual visit with Dr. Watson.  Patient was last seen 4/8/24 with Esmer Diaz PA-C.  He would like a call back to schedule with Dr. Watson

## 2025-07-02 NOTE — TELEPHONE ENCOUNTER
Spoke with patient who would like to re establish care for surveillance for CLL  Labs entered  Patient requesting virtual visit with Dr Watson due to increased pain  Will schedule this appt as virtual but next appt should be in office if necessary  Patient verbalizes understanding

## 2025-07-03 ENCOUNTER — IN HOME VISIT (OUTPATIENT)
Age: 65
End: 2025-07-03

## 2025-07-03 VITALS
OXYGEN SATURATION: 98 % | DIASTOLIC BLOOD PRESSURE: 71 MMHG | RESPIRATION RATE: 19 BRPM | TEMPERATURE: 98.3 F | HEART RATE: 83 BPM | SYSTOLIC BLOOD PRESSURE: 134 MMHG

## 2025-07-03 VITALS
RESPIRATION RATE: 18 BRPM | HEART RATE: 97 BPM | SYSTOLIC BLOOD PRESSURE: 140 MMHG | TEMPERATURE: 97.9 F | DIASTOLIC BLOOD PRESSURE: 78 MMHG | OXYGEN SATURATION: 99 %

## 2025-07-03 DIAGNOSIS — Z79.4 TYPE 2 DIABETES MELLITUS WITH COMPLICATION, WITH LONG-TERM CURRENT USE OF INSULIN (HCC): ICD-10-CM

## 2025-07-03 DIAGNOSIS — R52 GENERALIZED PAIN: Primary | ICD-10-CM

## 2025-07-03 DIAGNOSIS — I48.20 CHRONIC A-FIB (HCC): ICD-10-CM

## 2025-07-03 DIAGNOSIS — E11.8 TYPE 2 DIABETES MELLITUS WITH COMPLICATION, WITH LONG-TERM CURRENT USE OF INSULIN (HCC): ICD-10-CM

## 2025-07-03 DIAGNOSIS — E78.6 HYPOLIPIDEMIA: ICD-10-CM

## 2025-07-03 PROCEDURE — 96376 TX/PRO/DX INJ SAME DRUG ADON: CPT

## 2025-07-03 PROCEDURE — 99349 HOME/RES VST EST MOD MDM 40: CPT | Performed by: NURSE PRACTITIONER

## 2025-07-03 RX ORDER — SENNA AND DOCUSATE SODIUM 50; 8.6 MG/1; MG/1
1 TABLET, FILM COATED ORAL DAILY
Qty: 7 TABLET | Refills: 0 | Status: SHIPPED | OUTPATIENT
Start: 2025-07-03

## 2025-07-03 RX ORDER — MORPHINE SULFATE 4 MG/ML
4 INJECTION, SOLUTION INTRAMUSCULAR; INTRAVENOUS ONCE
Status: COMPLETED | OUTPATIENT
Start: 2025-07-03 | End: 2025-07-03

## 2025-07-03 RX ADMIN — MORPHINE SULFATE 4 MG: 4 INJECTION, SOLUTION INTRAMUSCULAR; INTRAVENOUS at 01:34
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
## 2025-07-03 NOTE — ASSESSMENT & PLAN NOTE
Lab Results   Component Value Date    HGBA1C 10.7 (H) 12/09/2024   Patient states he is taking insulin as he feels is necessary  He is concerned about his eating habits and taking insulin  States he has had several high readings and then he will take his insulin.    Reeducated on importance of taking insulin as prescribed  He is non compliant and won't listen to insulin directions    Orders:    POCT hemoglobin A1c

## 2025-07-03 NOTE — DISCHARGE INSTRUCTIONS
Please continue medications as prescribed.  Please call to make an appointment follow-up with your primary care doctor.    Please return to ER for develop a fever.

## 2025-07-03 NOTE — PROGRESS NOTES
:  Assessment & Plan  Generalized pain  Patient has chronic generalized pain he states he can often not relieve  He bought a new bed which he says is more comfortable  Educated to sleep in bed instead of recliner  Stated he uses CBD for pain relief which he states is helpful  Continue percocet as ordered-I do not want to change his pain medication at this time  Educated on use of percocet and proper dosage  Patient does not abuse or misuse percocet  Orders:    Ambulatory referral to chronic care management; Future    Type 2 diabetes mellitus with complication, with long-term current use of insulin ([facility])    Lab Results   Component Value Date    HGBA1C 10.7 (H) 12/09/2024   Patient states he is taking insulin as he feels is necessary  He is concerned about his eating habits and taking insulin  States he has had several high readings and then he will take his insulin.    Reeducated on importance of taking insulin as prescribed  He is non compliant and won't listen to insulin directions    Orders:    POCT hemoglobin A1c    Chronic a-fib (HCC)  Chronic, stable  Apical rate today 97  Continue eliquis and digoxin    Orders:    Digoxin level; Future    Hypolipidemia  Chronic  Continue lipitor  Orders:    Lipid panel; Future        History of Present Illness     Alonzo Watson is a 64 y.o. male   Patient is a 65 yo male seen and examined today at home due to being home bound. Due to gait instability.  Patient went to ER yesterday for what he describes as unbearable pain which he states morphine injections relieved. .  He has a history of generalized pain  as well as leukemia.  He was agitated today because of a mix up with the pharmacy over his percocet which I resolved.  He states he has lost weight due to poor appetite but his BG readings are often high.  He is completely non compliant with his diabetes regimen . >40 minutes was spent with patient on detailed history, physical exam and assessment, planning and diagnosing  and education.         Review of Systems   Constitutional:  Positive for appetite change and unexpected weight change.   HENT: Negative.     Eyes: Negative.    Respiratory: Negative.     Cardiovascular: Negative.    Gastrointestinal: Negative.    Endocrine: Negative.    Genitourinary: Negative.    Musculoskeletal:  Positive for arthralgias, back pain, gait problem and myalgias.   Skin: Negative.    Allergic/Immunologic: Negative.    Neurological:  Negative for dizziness, weakness, light-headedness and headaches.   Hematological: Negative.    Psychiatric/Behavioral:  Positive for agitation.      Objective   /78   Pulse 97   Temp 97.9 °F (36.6 °C)   Resp 18   SpO2 99%      Physical Exam  Constitutional:       General: He is not in acute distress.     Appearance: Normal appearance. He is obese. He is not ill-appearing, toxic-appearing or diaphoretic.   HENT:      Head: Normocephalic and atraumatic.      Right Ear: External ear normal.      Left Ear: External ear normal.      Nose: Nose normal. No congestion.      Mouth/Throat:      Mouth: Mucous membranes are moist.      Pharynx: Oropharynx is clear. No oropharyngeal exudate.     Eyes:      General:         Right eye: No discharge.         Left eye: No discharge.       Cardiovascular:      Rate and Rhythm: Normal rate and regular rhythm.      Pulses: Normal pulses.      Heart sounds: Normal heart sounds.   Pulmonary:      Effort: Pulmonary effort is normal. No respiratory distress.      Breath sounds: Normal breath sounds. No wheezing, rhonchi or rales.   Chest:      Chest wall: No tenderness.   Abdominal:      General: There is no distension.      Tenderness: There is no abdominal tenderness. There is no right CVA tenderness, left CVA tenderness, guarding or rebound.      Hernia: No hernia is present.      Comments: Stated had a BM today     Musculoskeletal:      Cervical back: Normal range of motion. No rigidity.      Right lower leg: No edema.      Left  lower leg: No edema.      Comments: Walker baseline   Lymphadenopathy:      Cervical: No cervical adenopathy.     Skin:     General: Skin is warm and dry.      Findings: No bruising, erythema, lesion or rash.     Neurological:      Mental Status: He is alert and oriented to person, place, and time.      Motor: No weakness.      Coordination: Coordination normal.      Gait: Gait abnormal.     Psychiatric:         Behavior: Behavior normal.         Thought Content: Thought content normal.         Judgment: Judgment normal.      Comments: Agitated today

## 2025-07-03 NOTE — ASSESSMENT & PLAN NOTE
Patient has chronic generalized pain he states he can often not relieve  He bought a new bed which he says is more comfortable  Educated to sleep in bed instead of recliner  Stated he uses CBD for pain relief which he states is helpful  Continue percocet as ordered-I do not want to change his pain medication at this time  Educated on use of percocet and proper dosage  Patient does not abuse or misuse percocet  Orders:    Ambulatory referral to chronic care management; Future

## 2025-07-03 NOTE — ASSESSMENT & PLAN NOTE
Chronic, stable  Apical rate today 97  Continue eliquis and digoxin    Orders:    Digoxin level; Future

## 2025-07-04 LAB
ATRIAL RATE: 288 BPM
QRS AXIS: 64 DEGREES
QRSD INTERVAL: 92 MS
QT INTERVAL: 398 MS
QTC INTERVAL: 444 MS
T WAVE AXIS: 39 DEGREES
VENTRICULAR RATE: 75 BPM

## 2025-07-04 PROCEDURE — 93010 ELECTROCARDIOGRAM REPORT: CPT | Performed by: INTERNAL MEDICINE

## 2025-07-07 ENCOUNTER — PATIENT OUTREACH (OUTPATIENT)
Age: 65
End: 2025-07-07

## 2025-07-07 DIAGNOSIS — E11.8 TYPE 2 DIABETES MELLITUS WITH COMPLICATION, WITH LONG-TERM CURRENT USE OF INSULIN (HCC): ICD-10-CM

## 2025-07-07 DIAGNOSIS — G89.29 CHRONIC INTRACTABLE PAIN: ICD-10-CM

## 2025-07-07 DIAGNOSIS — Z79.4 TYPE 2 DIABETES MELLITUS WITH COMPLICATION, WITH LONG-TERM CURRENT USE OF INSULIN (HCC): ICD-10-CM

## 2025-07-07 DIAGNOSIS — J43.2 CENTRILOBULAR EMPHYSEMA (HCC): ICD-10-CM

## 2025-07-07 RX ORDER — FLUTICASONE FUROATE, UMECLIDINIUM BROMIDE AND VILANTEROL TRIFENATATE 100; 62.5; 25 UG/1; UG/1; UG/1
1 POWDER RESPIRATORY (INHALATION) DAILY
Qty: 28 EACH | Refills: 0 | Status: SHIPPED | OUTPATIENT
Start: 2025-07-07

## 2025-07-07 NOTE — PROGRESS NOTES
The patient returned my call and left a message on my voicemail. I called him back but received his voicemail.  Message stated I will call back in a day or 2.

## 2025-07-09 ENCOUNTER — PATIENT OUTREACH (OUTPATIENT)
Age: 65
End: 2025-07-09

## 2025-07-09 NOTE — LETTER
July 9, 2025        Dear Mr. Watson,    We would like to invite you to participate in our Chronic Care Management program with the goal of improving your health. We will match you up with a care manager who will work with you to keep your chronic conditions under control. Please call your clinic or log on to [employer] if you have any questions or would like to enroll.    We look forward to working toward a healthier you together.         Sincerely,      Susan REESE Care Manager  [phone]

## 2025-07-14 ENCOUNTER — APPOINTMENT (EMERGENCY)
Dept: RADIOLOGY | Facility: HOSPITAL | Age: 65
End: 2025-07-14
Payer: COMMERCIAL

## 2025-07-14 ENCOUNTER — HOSPITAL ENCOUNTER (EMERGENCY)
Facility: HOSPITAL | Age: 65
Discharge: HOME | End: 2025-07-14
Attending: EMERGENCY MEDICINE | Admitting: EMERGENCY MEDICINE
Payer: COMMERCIAL

## 2025-07-14 VITALS
OXYGEN SATURATION: 97 % | TEMPERATURE: 97.5 F | HEART RATE: 72 BPM | BODY MASS INDEX: 29.64 KG/M2 | RESPIRATION RATE: 18 BRPM | SYSTOLIC BLOOD PRESSURE: 140 MMHG | DIASTOLIC BLOOD PRESSURE: 85 MMHG | WEIGHT: 212.52 LBS

## 2025-07-14 DIAGNOSIS — M54.9 CHRONIC BACK PAIN: ICD-10-CM

## 2025-07-14 DIAGNOSIS — G89.29 CHRONIC BACK PAIN: ICD-10-CM

## 2025-07-14 DIAGNOSIS — R33.9 URINARY RETENTION: ICD-10-CM
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
## 2025-07-14 DIAGNOSIS — N39.0 UTI (URINARY TRACT INFECTION): Primary | ICD-10-CM

## 2025-07-14 LAB
ALBUMIN SERPL BCG-MCNC: 3.8 G/DL (ref 3.5–5)
ALP SERPL-CCNC: 68 U/L (ref 34–104)
ALT SERPL W P-5'-P-CCNC: 26 U/L (ref 7–52)
ANION GAP SERPL CALCULATED.3IONS-SCNC: 8 MMOL/L (ref 4–13)
ANISOCYTOSIS BLD QL SMEAR: PRESENT
AST SERPL W P-5'-P-CCNC: 28 U/L (ref 13–39)
BACTERIA UR QL AUTO: ABNORMAL /HPF
BASOPHILS # BLD MANUAL: 0 THOUSAND/UL (ref 0–0.1)
BASOPHILS NFR MAR MANUAL: 0 % (ref 0–1)
BILIRUB SERPL-MCNC: 0.32 MG/DL (ref 0.2–1)
BILIRUB UR QL STRIP: NEGATIVE
BUN SERPL-MCNC: 26 MG/DL (ref 5–25)
CALCIUM SERPL-MCNC: 9.3 MG/DL (ref 8.4–10.2)
CARDIAC TROPONIN I PNL SERPL HS: 12 NG/L (ref 8–18)
CHLORIDE SERPL-SCNC: 98 MMOL/L (ref 96–108)
CLARITY UR: CLEAR
CO2 SERPL-SCNC: 31 MMOL/L (ref 21–32)
COLOR UR: YELLOW
CREAT SERPL-MCNC: 1.1 MG/DL (ref 0.6–1.3)
EOSINOPHIL # BLD MANUAL: 0.46 THOUSAND/UL (ref 0–0.4)
EOSINOPHIL NFR BLD MANUAL: 2 % (ref 0–6)
ERYTHROCYTE [DISTWIDTH] IN BLOOD BY AUTOMATED COUNT: 13.2 % (ref 11.6–15.1)
GFR SERPL CREATININE-BSD FRML MDRD: 70 ML/MIN/1.73SQ M
GLUCOSE SERPL-MCNC: 211 MG/DL (ref 65–140)
GLUCOSE UR STRIP-MCNC: ABNORMAL MG/DL
HCT VFR BLD AUTO: 37.2 % (ref 36.5–49.3)
HGB BLD-MCNC: 12.3 G/DL (ref 12–17)
HGB UR QL STRIP.AUTO: NEGATIVE
HYALINE CASTS #/AREA URNS LPF: ABNORMAL /LPF
KETONES UR STRIP-MCNC: NEGATIVE MG/DL
LEUKOCYTE ESTERASE UR QL STRIP: ABNORMAL
LYMPHOCYTES # BLD AUTO: 19.32 THOUSAND/UL (ref 0.6–4.47)
LYMPHOCYTES # BLD AUTO: 84 % (ref 14–44)
MACROCYTES BLD QL AUTO: PRESENT
MCH RBC QN AUTO: 31.9 PG (ref 26.8–34.3)
MCHC RBC AUTO-ENTMCNC: 33.1 G/DL (ref 31.4–37.4)
MCV RBC AUTO: 96 FL (ref 82–98)
MONOCYTES # BLD AUTO: 0.69 THOUSAND/UL (ref 0–1.22)
MONOCYTES NFR BLD: 3 % (ref 4–12)
NEUTROPHILS # BLD MANUAL: 2.53 THOUSAND/UL (ref 1.85–7.62)
NEUTS SEG NFR BLD AUTO: 11 % (ref 43–75)
NITRITE UR QL STRIP: NEGATIVE
NON-SQ EPI CELLS URNS QL MICRO: ABNORMAL /HPF
PH UR STRIP.AUTO: 5.5 [PH]
PLATELET # BLD AUTO: 125 THOUSANDS/UL (ref 149–390)
PLATELET BLD QL SMEAR: ABNORMAL
PMV BLD AUTO: 10 FL (ref 8.9–12.7)
POTASSIUM SERPL-SCNC: 4 MMOL/L (ref 3.5–5.3)
PROT SERPL-MCNC: 6.3 G/DL (ref 6.4–8.4)
PROT UR STRIP-MCNC: NEGATIVE MG/DL
RBC # BLD AUTO: 3.86 MILLION/UL (ref 3.88–5.62)
RBC #/AREA URNS AUTO: ABNORMAL /HPF
RBC MORPH BLD: PRESENT
SODIUM SERPL-SCNC: 137 MMOL/L (ref 135–147)
SP GR UR STRIP.AUTO: 1.02 (ref 1–1.03)
STOMATOCYTES BLD QL SMEAR: PRESENT
UROBILINOGEN UR STRIP-ACNC: <2 MG/DL
WBC # BLD AUTO: 23 THOUSAND/UL (ref 4.31–10.16)
WBC #/AREA URNS AUTO: ABNORMAL /HPF

## 2025-07-14 PROCEDURE — 72100 X-RAY EXAM L-S SPINE 2/3 VWS: CPT

## 2025-07-14 PROCEDURE — 87086 URINE CULTURE/COLONY COUNT: CPT | Performed by: EMERGENCY MEDICINE

## 2025-07-14 PROCEDURE — 71045 X-RAY EXAM CHEST 1 VIEW: CPT

## 2025-07-14 PROCEDURE — 99284 EMERGENCY DEPT VISIT MOD MDM: CPT | Performed by: EMERGENCY MEDICINE

## 2025-07-14 PROCEDURE — 96365 THER/PROPH/DIAG IV INF INIT: CPT

## 2025-07-14 PROCEDURE — 70450 CT HEAD/BRAIN W/O DYE: CPT

## 2025-07-14 PROCEDURE — 81001 URINALYSIS AUTO W/SCOPE: CPT | Performed by: EMERGENCY MEDICINE

## 2025-07-14 PROCEDURE — 99284 EMERGENCY DEPT VISIT MOD MDM: CPT

## 2025-07-14 PROCEDURE — 85027 COMPLETE CBC AUTOMATED: CPT | Performed by: EMERGENCY MEDICINE

## 2025-07-14 PROCEDURE — 36415 COLL VENOUS BLD VENIPUNCTURE: CPT | Performed by: EMERGENCY MEDICINE

## 2025-07-14 PROCEDURE — 84484 ASSAY OF TROPONIN QUANT: CPT | Performed by: EMERGENCY MEDICINE

## 2025-07-14 PROCEDURE — 85007 BL SMEAR W/DIFF WBC COUNT: CPT | Performed by: EMERGENCY MEDICINE

## 2025-07-14 PROCEDURE — 93005 ELECTROCARDIOGRAM TRACING: CPT

## 2025-07-14 PROCEDURE — 80053 COMPREHEN METABOLIC PANEL: CPT | Performed by: EMERGENCY MEDICINE

## 2025-07-14 PROCEDURE — 87147 CULTURE TYPE IMMUNOLOGIC: CPT | Performed by: EMERGENCY MEDICINE

## 2025-07-14 RX ORDER — CEFTRIAXONE 1 G/50ML
1000 INJECTION, SOLUTION INTRAVENOUS ONCE
Status: COMPLETED | OUTPATIENT
Start: 2025-07-14 | End: 2025-07-14

## 2025-07-14 RX ORDER — OXYCODONE HYDROCHLORIDE 5 MG/1
5 TABLET ORAL ONCE
Refills: 0 | Status: COMPLETED | OUTPATIENT
Start: 2025-07-14 | End: 2025-07-14

## 2025-07-14 RX ORDER — ACETAMINOPHEN 325 MG/1
975 TABLET ORAL ONCE
Status: COMPLETED | OUTPATIENT
Start: 2025-07-14 | End: 2025-07-14

## 2025-07-14 RX ORDER — CEPHALEXIN 500 MG/1
500 CAPSULE ORAL 2 TIMES DAILY
Qty: 14 CAPSULE | Refills: 0 | Status: SHIPPED | OUTPATIENT
Start: 2025-07-14 | End: 2025-07-21

## 2025-07-14 RX ADMIN — ACETAMINOPHEN 975 MG: 325 TABLET ORAL at 15:11

## 2025-07-14 RX ADMIN — CEFTRIAXONE 1000 MG: 1 INJECTION, SOLUTION INTRAVENOUS at 19:39

## 2025-07-14 RX ADMIN — OXYCODONE HYDROCHLORIDE 5 MG: 5 TABLET ORAL at 19:26

## 2025-07-14 RX ADMIN — OXYCODONE HYDROCHLORIDE 5 MG: 5 TABLET ORAL at 17:02

## 2025-07-14 NOTE — ED NOTES
"Attempted to urinate, barriers provided, pt stood at bedside and pt informed \"I just don't have to go right now.\"     Tabitha Klein RN  07/14/25 3007    "
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
## 2025-07-15 LAB
QRS AXIS: 90 DEGREES
QRSD INTERVAL: 88 MS
QT INTERVAL: 422 MS
QTC INTERVAL: 403 MS
T WAVE AXIS: 23 DEGREES
VENTRICULAR RATE: 55 BPM

## 2025-07-15 PROCEDURE — 93010 ELECTROCARDIOGRAM REPORT: CPT | Performed by: INTERNAL MEDICINE

## 2025-07-16 ENCOUNTER — TELEPHONE (OUTPATIENT)
Age: 65
End: 2025-07-16

## 2025-07-16 LAB
BACTERIA UR CULT: ABNORMAL
BACTERIA UR CULT: ABNORMAL

## 2025-07-16 NOTE — TELEPHONE ENCOUNTER
Called patient to inform of open appts for a follow up . He adv he is not coming in office as he is seen at home by Tuesday. He would like a phone call to discuss medication. Thank you

## 2025-07-16 NOTE — TELEPHONE ENCOUNTER
[phone] PT called after speaking w/ PCP. Understanding she will be out of office, he is requesting a call to go over his medications. If possible he is asking for a call today no later than tomorrow . Please adv , Thank you

## 2025-07-17 NOTE — TELEPHONE ENCOUNTER
The initial message did not specify the type of appt needed. When ED discharge f/u was mentioned, Id typically assume it to be an in office appt unless otherwise indicated. Scheduled virtual w/  this afternoon to answer PT questions in regard to medication.   Thank you

## 2025-07-18 ENCOUNTER — TELEPHONE (OUTPATIENT)
Age: 65
End: 2025-07-18

## 2025-07-18 DIAGNOSIS — I48.91 ATRIAL FIBRILLATION WITH RVR (HCC): ICD-10-CM

## 2025-07-18 DIAGNOSIS — I50.9 CHRONIC CONGESTIVE HEART FAILURE, UNSPECIFIED HEART FAILURE TYPE (HCC): ICD-10-CM

## 2025-07-18 RX ORDER — APIXABAN 5 MG/1
5 TABLET, FILM COATED ORAL 2 TIMES DAILY
Qty: 60 TABLET | Refills: 3 | Status: SHIPPED | OUTPATIENT
Start: 2025-07-18

## 2025-07-18 RX ORDER — BUMETANIDE 1 MG/1
1 TABLET ORAL 2 TIMES DAILY
Qty: 60 TABLET | Refills: 0 | Status: SHIPPED | OUTPATIENT
Start: 2025-07-18

## 2025-07-28 ENCOUNTER — TELEMEDICINE (OUTPATIENT)
Age: 65
End: 2025-07-28

## 2025-07-28 DIAGNOSIS — G89.29 CHRONIC INTRACTABLE PAIN: ICD-10-CM

## 2025-07-28 DIAGNOSIS — N39.0 URINARY TRACT INFECTION WITHOUT HEMATURIA, SITE UNSPECIFIED: Primary | ICD-10-CM

## 2025-07-28 DIAGNOSIS — R19.7 DIARRHEA, UNSPECIFIED TYPE: ICD-10-CM

## 2025-07-28 PROCEDURE — G2211 COMPLEX E/M VISIT ADD ON: HCPCS | Performed by: FAMILY MEDICINE

## 2025-07-28 PROCEDURE — 99213 OFFICE O/P EST LOW 20 MIN: CPT | Performed by: FAMILY MEDICINE

## 2025-07-28 RX ORDER — AMOXICILLIN 875 MG/1
875 TABLET, COATED ORAL 2 TIMES DAILY
Qty: 14 TABLET | Refills: 0 | Status: SHIPPED | OUTPATIENT
Start: 2025-07-28 | End: 2025-08-06

## 2025-07-28 RX ORDER — AMOXICILLIN 500 MG/1
500 TABLET, FILM COATED ORAL EVERY 8 HOURS
Qty: 21 TABLET | Refills: 0 | Status: SHIPPED | OUTPATIENT
Start: 2025-07-28 | End: 2025-07-28

## 2025-07-28 RX ORDER — OXYCODONE AND ACETAMINOPHEN 5; 325 MG/1; MG/1
2 TABLET ORAL EVERY 4 HOURS PRN
Qty: 360 TABLET | Refills: 0 | OUTPATIENT
Start: 2025-07-28

## 2025-07-28 RX ORDER — SACCHAROMYCES BOULARDII 250 MG
250 CAPSULE ORAL 2 TIMES DAILY PRN
Qty: 30 CAPSULE | Refills: 0 | Status: SHIPPED | OUTPATIENT
Start: 2025-07-28 | End: 2025-08-06

## 2025-07-30 ENCOUNTER — TELEPHONE (OUTPATIENT)
Dept: HEMATOLOGY ONCOLOGY | Facility: MEDICAL CENTER | Age: 65
End: 2025-07-30

## 2025-07-30 DIAGNOSIS — D80.3 IGG DEFICIENCY (HCC): Primary | ICD-10-CM

## 2025-07-30 DIAGNOSIS — C91.10 CLL (CHRONIC LYMPHOCYTIC LEUKEMIA) (HCC): ICD-10-CM

## 2025-07-31 DIAGNOSIS — G89.29 CHRONIC INTRACTABLE PAIN: ICD-10-CM

## 2025-07-31 RX ORDER — OXYCODONE AND ACETAMINOPHEN 5; 325 MG/1; MG/1
2 TABLET ORAL EVERY 4 HOURS PRN
Qty: 360 TABLET | Refills: 0 | Status: SHIPPED | OUTPATIENT
Start: 2025-07-31

## 2025-08-04 ENCOUNTER — HOSPITAL ENCOUNTER (INPATIENT)
Facility: HOSPITAL | Age: 65
LOS: 2 days | Discharge: HOME | DRG: 638 | End: 2025-08-06
Attending: EMERGENCY MEDICINE | Admitting: INTERNAL MEDICINE
Payer: COMMERCIAL

## 2025-08-04 ENCOUNTER — APPOINTMENT (EMERGENCY)
Dept: RADIOLOGY | Facility: HOSPITAL | Age: 65
DRG: 638 | End: 2025-08-04
Payer: COMMERCIAL

## 2025-08-04 PROBLEM — E87.29 HIGH ANION GAP METABOLIC ACIDOSIS: Status: ACTIVE | Noted: 2025-08-04

## 2025-08-04 PROBLEM — D72.820 LYMPHOCYTOSIS: Status: ACTIVE | Noted: 2024-08-18

## 2025-08-05 PROBLEM — E87.29 HIGH ANION GAP METABOLIC ACIDOSIS: Status: RESOLVED | Noted: 2025-08-04 | Resolved: 2025-08-05

## 2025-08-06 PROBLEM — R53.1 GENERALIZED WEAKNESS: Status: RESOLVED | Noted: 2023-05-05 | Resolved: 2025-08-06

## 2025-08-06 PROBLEM — R65.10 SIRS (SYSTEMIC INFLAMMATORY RESPONSE SYNDROME) (HCC): Status: RESOLVED | Noted: 2022-02-07 | Resolved: 2025-08-06

## 2025-08-06 PROBLEM — D72.829 LEUKOCYTOSIS: Status: RESOLVED | Noted: 2024-08-18 | Resolved: 2025-08-06

## 2025-08-06 PROBLEM — R73.9 HYPERGLYCEMIA: Status: RESOLVED | Noted: 2020-05-01 | Resolved: 2025-08-06

## 2025-08-07 ENCOUNTER — TRANSITIONAL CARE MANAGEMENT (OUTPATIENT)
Age: 65
End: 2025-08-07

## 2025-08-08 ENCOUNTER — TELEPHONE (OUTPATIENT)
Age: 65
End: 2025-08-08

## 2025-08-14 ENCOUNTER — IN HOME VISIT (OUTPATIENT)
Age: 65
End: 2025-08-14

## 2025-08-14 PROBLEM — R05.1 ACUTE COUGH: Status: ACTIVE | Noted: 2025-08-14

## (undated) DEVICE — BITE BLOCK MAXI 60FR LF STRAP

## (undated) DEVICE — TRAY EPID CONT PERIFIX 18G X 3.5IN 5ML CLSD TIP DRAPE

## (undated) DEVICE — "MB-142 MOUTHPIECE": Brand: MOUTHPIECE

## (undated) DEVICE — 1200CC GUARDIAN II: Brand: GUARDIAN

## (undated) DEVICE — "MH-438 A/W VLVE F/140 EVIS-140": Brand: AIR/WATER VALVE

## (undated) DEVICE — LUBRICANT SURGILUBE TUBE 4 OZ  FLIP TOP

## (undated) DEVICE — TRAVELKIT CONTAINS FIRST STEP KIT (200ML EP-4 KIT) AND SOILED SCOPE BAG - 1 KIT: Brand: TRAVELKIT CONTAINS FIRST STEP KIT AND SOILED SCOPE BAG

## (undated) DEVICE — MEDI-VAC YANKAUER SUCTION HANDLE: Brand: CARDINAL HEALTH

## (undated) DEVICE — GAUZE SPONGES,16 PLY: Brand: CURITY

## (undated) DEVICE — GLOVE SRG BIOGEL 7.5

## (undated) DEVICE — TOWEL SET X-RAY

## (undated) DEVICE — RADIOLOGY STERILE LABELS: Brand: CENTURION

## (undated) DEVICE — DISPOSABLE BIOPSY VALVE MAJ-1555: Brand: SINGLE USE BIOPSY VALVE (STERILE)

## (undated) DEVICE — GLOVE EXAM NON-STRL NTRL PLUS LRG PF

## (undated) DEVICE — "MH-443 SUCTION VALVE F/EVIS140 EVIS160": Brand: SUCTION VALVE

## (undated) DEVICE — TUBING BUBBLE CLEAR 5MM X 100 FT NS

## (undated) DEVICE — "MAJ-901 WATER CONTAINER SET CV-160/140": Brand: WATER CONTAINER

## (undated) DEVICE — TUBING AUX CHANNEL

## (undated) DEVICE — 60 ML SYRINGE,REGULAR TIP: Brand: MONOJECT

## (undated) DEVICE — SYRINGE 3ML LL

## (undated) DEVICE — SOLIDIFIER FLUID WASTE CONTROL 1500ML

## (undated) DEVICE — BITE BLOCK ENDO 60FR ADLT MAXI  DISP W/STRAP

## (undated) DEVICE — FORCEP ELECSURG RADIAL JAW4 2.2 X 240CM  HOT BX

## (undated) DEVICE — BRUSH ENDO CLEANING DBL-HEADER

## (undated) DEVICE — SKIN MARKER DUAL TIP WITH RULER CAP, FLEXIBLE RULER AND LABELS: Brand: DEVON

## (undated) DEVICE — AIRLIFE™  ADULT CUSHION NASAL CANNULA WITH 7 FOOT (2.1 M) CRUSH-RESISTANT OXYGEN TUBING, AND U/CONNECT-IT ADAPTER: Brand: AIRLIFE™

## (undated) DEVICE — WIPES BABY PAMPERS SENSITIVE 36/PK

## (undated) DEVICE — SINGLE-USE BIOPSY FORCEPS: Brand: RADIAL JAW 4

## (undated) DEVICE — CHLORAPREP APPLICATOR TINTED 10.5ML ONE-STEP

## (undated) DEVICE — NEEDLE SCLERO INTERJECT 25G 240MM

## (undated) DEVICE — NEEDLE SPINAL 25G X 3.5 IN QUINCKE

## (undated) DEVICE — SYRINGE 5ML LL

## (undated) DEVICE — NEEDLE SPINAL 22G X 5IN QUINCKE

## (undated) DEVICE — NEEDLE BLUNT 18 G X 1 1/2 W FILTER

## (undated) DEVICE — PLASTIC ADHESIVE BANDAGE: Brand: CURITY

## (undated) DEVICE — SMALL NEEDLE COUNTER NEST

## (undated) DEVICE — SYRINGE 10ML LL